# Patient Record
Sex: MALE | Race: BLACK OR AFRICAN AMERICAN | NOT HISPANIC OR LATINO | Employment: OTHER | URBAN - METROPOLITAN AREA
[De-identification: names, ages, dates, MRNs, and addresses within clinical notes are randomized per-mention and may not be internally consistent; named-entity substitution may affect disease eponyms.]

---

## 2017-06-21 ENCOUNTER — APPOINTMENT (OUTPATIENT)
Dept: GENERAL RADIOLOGY | Facility: HOSPITAL | Age: 72
End: 2017-06-21

## 2017-06-21 ENCOUNTER — HOSPITAL ENCOUNTER (EMERGENCY)
Facility: HOSPITAL | Age: 72
Discharge: HOME | End: 2017-06-21
Attending: EMERGENCY MEDICINE | Admitting: EMERGENCY MEDICINE

## 2017-06-21 VITALS
HEART RATE: 64 BPM | BODY MASS INDEX: 24.87 KG/M2 | WEIGHT: 200 LBS | OXYGEN SATURATION: 97 % | TEMPERATURE: 98 F | SYSTOLIC BLOOD PRESSURE: 143 MMHG | HEIGHT: 75 IN | RESPIRATION RATE: 16 BRPM | DIASTOLIC BLOOD PRESSURE: 76 MMHG

## 2017-06-21 DIAGNOSIS — M45.9 ANKYLOSING SPONDYLITIS (HCC): ICD-10-CM

## 2017-06-21 DIAGNOSIS — E87.6 HYPOKALEMIA: ICD-10-CM

## 2017-06-21 DIAGNOSIS — G89.29 EXACERBATION OF CHRONIC BACK PAIN: Primary | ICD-10-CM

## 2017-06-21 DIAGNOSIS — M54.9 EXACERBATION OF CHRONIC BACK PAIN: Primary | ICD-10-CM

## 2017-06-21 LAB
ALBUMIN SERPL-MCNC: 3.6 G/DL (ref 3.5–5.2)
ALBUMIN/GLOB SERPL: 0.7 G/DL
ALP SERPL-CCNC: 53 U/L (ref 39–117)
ALT SERPL W P-5'-P-CCNC: 24 U/L (ref 1–41)
ANION GAP SERPL CALCULATED.3IONS-SCNC: 12.9 MMOL/L
AST SERPL-CCNC: 27 U/L (ref 1–40)
BASOPHILS # BLD AUTO: 0.04 10*3/MM3 (ref 0–0.2)
BASOPHILS NFR BLD AUTO: 0.4 % (ref 0–1.5)
BILIRUB SERPL-MCNC: 0.8 MG/DL (ref 0.1–1.2)
BILIRUB UR QL STRIP: NEGATIVE
BUN BLD-MCNC: 21 MG/DL (ref 8–23)
BUN/CREAT SERPL: 23.1 (ref 7–25)
CALCIUM SPEC-SCNC: 10 MG/DL (ref 8.6–10.5)
CHLORIDE SERPL-SCNC: 93 MMOL/L (ref 98–107)
CLARITY UR: CLEAR
CO2 SERPL-SCNC: 29.1 MMOL/L (ref 22–29)
COLOR UR: ABNORMAL
CREAT BLD-MCNC: 0.91 MG/DL (ref 0.76–1.27)
DEPRECATED RDW RBC AUTO: 47.8 FL (ref 37–54)
EOSINOPHIL # BLD AUTO: 0.27 10*3/MM3 (ref 0–0.7)
EOSINOPHIL NFR BLD AUTO: 3 % (ref 0.3–6.2)
ERYTHROCYTE [DISTWIDTH] IN BLOOD BY AUTOMATED COUNT: 14.3 % (ref 11.5–14.5)
GFR SERPL CREATININE-BSD FRML MDRD: 99 ML/MIN/1.73
GLOBULIN UR ELPH-MCNC: 5.2 GM/DL
GLUCOSE BLD-MCNC: 114 MG/DL (ref 65–99)
GLUCOSE UR STRIP-MCNC: NEGATIVE MG/DL
HCT VFR BLD AUTO: 40 % (ref 40.4–52.2)
HGB BLD-MCNC: 13.1 G/DL (ref 13.7–17.6)
HGB UR QL STRIP.AUTO: NEGATIVE
HOLD SPECIMEN: NORMAL
HOLD SPECIMEN: NORMAL
IMM GRANULOCYTES # BLD: 0.02 10*3/MM3 (ref 0–0.03)
IMM GRANULOCYTES NFR BLD: 0.2 % (ref 0–0.5)
KETONES UR QL STRIP: NEGATIVE
LEUKOCYTE ESTERASE UR QL STRIP.AUTO: NEGATIVE
LYMPHOCYTES # BLD AUTO: 1.74 10*3/MM3 (ref 0.9–4.8)
LYMPHOCYTES NFR BLD AUTO: 19.2 % (ref 19.6–45.3)
MAGNESIUM SERPL-MCNC: 2.4 MG/DL (ref 1.6–2.4)
MCH RBC QN AUTO: 29.8 PG (ref 27–32.7)
MCHC RBC AUTO-ENTMCNC: 32.8 G/DL (ref 32.6–36.4)
MCV RBC AUTO: 90.9 FL (ref 79.8–96.2)
MONOCYTES # BLD AUTO: 0.84 10*3/MM3 (ref 0.2–1.2)
MONOCYTES NFR BLD AUTO: 9.3 % (ref 5–12)
NEUTROPHILS # BLD AUTO: 6.14 10*3/MM3 (ref 1.9–8.1)
NEUTROPHILS NFR BLD AUTO: 67.9 % (ref 42.7–76)
NITRITE UR QL STRIP: NEGATIVE
PH UR STRIP.AUTO: 6 [PH] (ref 5–8)
PLATELET # BLD AUTO: 347 10*3/MM3 (ref 140–500)
PMV BLD AUTO: 9.9 FL (ref 6–12)
POTASSIUM BLD-SCNC: 3.2 MMOL/L (ref 3.5–5.2)
PROT SERPL-MCNC: 8.8 G/DL (ref 6–8.5)
PROT UR QL STRIP: ABNORMAL
RBC # BLD AUTO: 4.4 10*6/MM3 (ref 4.6–6)
SODIUM BLD-SCNC: 135 MMOL/L (ref 136–145)
SP GR UR STRIP: 1.02 (ref 1–1.03)
TROPONIN T SERPL-MCNC: <0.01 NG/ML (ref 0–0.03)
UROBILINOGEN UR QL STRIP: ABNORMAL
WBC NRBC COR # BLD: 9.05 10*3/MM3 (ref 4.5–10.7)
WHOLE BLOOD HOLD SPECIMEN: NORMAL
WHOLE BLOOD HOLD SPECIMEN: NORMAL

## 2017-06-21 PROCEDURE — 71020 HC CHEST PA AND LATERAL: CPT

## 2017-06-21 PROCEDURE — 96374 THER/PROPH/DIAG INJ IV PUSH: CPT

## 2017-06-21 PROCEDURE — 96375 TX/PRO/DX INJ NEW DRUG ADDON: CPT

## 2017-06-21 PROCEDURE — 85025 COMPLETE CBC W/AUTO DIFF WBC: CPT | Performed by: EMERGENCY MEDICINE

## 2017-06-21 PROCEDURE — 25010000002 MORPHINE PER 10 MG: Performed by: EMERGENCY MEDICINE

## 2017-06-21 PROCEDURE — 83735 ASSAY OF MAGNESIUM: CPT | Performed by: EMERGENCY MEDICINE

## 2017-06-21 PROCEDURE — 96361 HYDRATE IV INFUSION ADD-ON: CPT

## 2017-06-21 PROCEDURE — 99284 EMERGENCY DEPT VISIT MOD MDM: CPT

## 2017-06-21 PROCEDURE — 25010000002 ONDANSETRON PER 1 MG: Performed by: EMERGENCY MEDICINE

## 2017-06-21 PROCEDURE — 93005 ELECTROCARDIOGRAM TRACING: CPT | Performed by: EMERGENCY MEDICINE

## 2017-06-21 PROCEDURE — 25010000002 METHYLPREDNISOLONE PER 125 MG: Performed by: EMERGENCY MEDICINE

## 2017-06-21 PROCEDURE — 80053 COMPREHEN METABOLIC PANEL: CPT | Performed by: EMERGENCY MEDICINE

## 2017-06-21 PROCEDURE — 84484 ASSAY OF TROPONIN QUANT: CPT | Performed by: EMERGENCY MEDICINE

## 2017-06-21 PROCEDURE — 36415 COLL VENOUS BLD VENIPUNCTURE: CPT | Performed by: EMERGENCY MEDICINE

## 2017-06-21 PROCEDURE — 81003 URINALYSIS AUTO W/O SCOPE: CPT | Performed by: EMERGENCY MEDICINE

## 2017-06-21 RX ORDER — OXYCODONE HYDROCHLORIDE AND ACETAMINOPHEN 5; 325 MG/1; MG/1
1 TABLET ORAL EVERY 6 HOURS PRN
Qty: 30 TABLET | Refills: 0 | Status: SHIPPED | OUTPATIENT
Start: 2017-06-21 | End: 2017-06-29

## 2017-06-21 RX ORDER — SODIUM CHLORIDE 0.9 % (FLUSH) 0.9 %
10 SYRINGE (ML) INJECTION AS NEEDED
Status: DISCONTINUED | OUTPATIENT
Start: 2017-06-21 | End: 2017-06-22 | Stop reason: HOSPADM

## 2017-06-21 RX ORDER — SODIUM CHLORIDE 9 MG/ML
125 INJECTION, SOLUTION INTRAVENOUS CONTINUOUS
Status: DISCONTINUED | OUTPATIENT
Start: 2017-06-21 | End: 2017-06-22 | Stop reason: HOSPADM

## 2017-06-21 RX ORDER — MORPHINE SULFATE 2 MG/ML
2 INJECTION, SOLUTION INTRAMUSCULAR; INTRAVENOUS ONCE
Status: COMPLETED | OUTPATIENT
Start: 2017-06-21 | End: 2017-06-21

## 2017-06-21 RX ORDER — METHYLPREDNISOLONE SODIUM SUCCINATE 125 MG/2ML
125 INJECTION, POWDER, LYOPHILIZED, FOR SOLUTION INTRAMUSCULAR; INTRAVENOUS ONCE
Status: COMPLETED | OUTPATIENT
Start: 2017-06-21 | End: 2017-06-21

## 2017-06-21 RX ORDER — PREDNISONE 10 MG/1
40 TABLET ORAL DAILY
Qty: 120 TABLET | Refills: 0 | Status: SHIPPED | OUTPATIENT
Start: 2017-06-21 | End: 2017-07-27 | Stop reason: SDUPTHER

## 2017-06-21 RX ORDER — ONDANSETRON 2 MG/ML
4 INJECTION INTRAMUSCULAR; INTRAVENOUS ONCE
Status: COMPLETED | OUTPATIENT
Start: 2017-06-21 | End: 2017-06-21

## 2017-06-21 RX ADMIN — ONDANSETRON 4 MG: 2 INJECTION INTRAMUSCULAR; INTRAVENOUS at 21:21

## 2017-06-21 RX ADMIN — MORPHINE SULFATE 2 MG: 2 INJECTION, SOLUTION INTRAMUSCULAR; INTRAVENOUS at 21:21

## 2017-06-21 RX ADMIN — METHYLPREDNISOLONE SODIUM SUCCINATE 125 MG: 125 INJECTION, POWDER, FOR SOLUTION INTRAMUSCULAR; INTRAVENOUS at 21:17

## 2017-06-21 RX ADMIN — SODIUM CHLORIDE 125 ML/HR: 9 INJECTION, SOLUTION INTRAVENOUS at 21:20

## 2017-06-21 RX ADMIN — SODIUM CHLORIDE 500 ML: 9 INJECTION, SOLUTION INTRAVENOUS at 21:17

## 2017-06-21 NOTE — ED TRIAGE NOTES
"Pt states his body is shutting down, unable to eat or drink much, fell last week, body aches, dizzy, \" all over pain\"  "

## 2017-06-22 NOTE — ED PROVIDER NOTES
EMERGENCY DEPARTMENT ENCOUNTER    CHIEF COMPLAINT  Chief Complaint: generalized myalgias  History given by: patient, family  History limited by: nothing  Room Number: 19/19  PMD: Dario Martinez MD      HPI:  Pt is a 72 y.o. male with a history of ankylosing spondylitis, who presents complaining of malaise and generalized myalgias for the last two weeks. Pt also complains of N/V, generalized weakness and decreased appetite. Pt denies any recent medication changes but states that he did lose 20 pounds over the last month. Pt states that he had similar symptoms in 2000 and states that he was started on Remicade and his prednisone dosage was increased at that time. Pt states that he recently moved to [place] from [place] and has not established care in [place] yet.    Duration:  2 weeks  Onset: gradual  Timing: constant  Location: generalized  Radiation: N/A  Quality: myalgias  Intensity/Severity: moderate  Progression: worsening  Associated Symptoms: malaise, N/V, decreased appetite, weight loss, generalized weakness  Aggravating Factors: none  Alleviating Factors: none  Previous Episodes: Pt states that he had similar symptoms in 2000 and states that he was started on Remicade and his prednisone dosage was increased at that time.  Treatment before arrival: Pt states that he takes 5mg Prednisone daily w/o relief.     PAST MEDICAL HISTORY  Active Ambulatory Problems     Diagnosis Date Noted   • No Active Ambulatory Problems     Resolved Ambulatory Problems     Diagnosis Date Noted   • No Resolved Ambulatory Problems     Past Medical History:   Diagnosis Date   • AS (ankylosing spondylitis)        PAST SURGICAL HISTORY  History reviewed. No pertinent surgical history.    FAMILY HISTORY  History reviewed. No pertinent family history.    SOCIAL HISTORY  Social History     Social History   • Marital status: Single     Spouse name: N/A   • Number of children: N/A   • Years of education: N/A     Occupational  History   • Not on file.     Social History Main Topics   • Smoking status: Not on file   • Smokeless tobacco: Not on file   • Alcohol use Not on file   • Drug use: Not on file   • Sexual activity: Not on file     Other Topics Concern   • Not on file     Social History Narrative   • No narrative on file       ALLERGIES  Review of patient's allergies indicates no known allergies.    REVIEW OF SYSTEMS  Review of Systems   Constitutional: Positive for appetite change (decreased) and unexpected weight change (20 pounds in the last month). Negative for activity change and fever.        Malaise   HENT: Negative for congestion and sore throat.    Eyes: Negative.    Respiratory: Negative for cough and shortness of breath.    Cardiovascular: Negative for chest pain and leg swelling.   Gastrointestinal: Positive for nausea and vomiting. Negative for abdominal pain and diarrhea.   Endocrine: Negative.    Genitourinary: Negative for decreased urine volume and dysuria.   Musculoskeletal: Negative for neck pain.   Skin: Negative for rash and wound.   Allergic/Immunologic: Negative.    Neurological: Positive for weakness (generalized). Negative for numbness and headaches.   Hematological: Negative.    Psychiatric/Behavioral: Negative.    All other systems reviewed and are negative.      PHYSICAL EXAM  ED Triage Vitals   Temp Heart Rate Resp BP SpO2   06/21/17 1922 06/21/17 1922 06/21/17 1922 06/21/17 1951 06/21/17 1922   98.5 °F (36.9 °C) 65 16 105/66 100 %      Temp src Heart Rate Source Patient Position BP Location FiO2 (%)   06/21/17 1922 06/21/17 1922 -- -- --   Tympanic Monitor          Physical Exam   Constitutional: He is oriented to person, place, and time and well-developed, well-nourished, and in no distress.  Non-toxic appearance.   HENT:   Head: Normocephalic and atraumatic.   Mouth/Throat: Mucous membranes are normal. Mucous membranes are not dry.   Eyes: EOM are normal. Pupils are equal, round, and reactive to light.    Neck: Neck supple.   Cardiovascular: Normal rate, regular rhythm and normal heart sounds.    Pulmonary/Chest: Effort normal and breath sounds normal. No respiratory distress.   Abdominal: Soft. There is no tenderness. There is no rebound and no guarding.   Musculoskeletal: He exhibits no edema.   Pt's joints, especially the C-spine and T-spine, are stiff c/w pt's known diagnosis of ankylosing spondylosis.    Neurological: He is alert and oriented to person, place, and time. He has normal sensation and normal strength.   Skin: Skin is warm and dry.   Psychiatric: Mood and affect normal.   Nursing note and vitals reviewed.      LAB RESULTS  Lab Results (last 24 hours)     Procedure Component Value Units Date/Time    CBC & Differential [[id number]] Collected:  06/21/17 2003    Specimen:  Blood Updated:  06/21/17 2031    Narrative:       The following orders were created for panel order CBC & Differential.  Procedure                               Abnormality         Status                     ---------                               -----------         ------                     CBC Auto Differential[[id number]]        Abnormal            Final result                 Please view results for these tests on the individual orders.    Comprehensive Metabolic Panel [[id number]]  (Abnormal) Collected:  06/21/17 2003    Specimen:  Blood Updated:  06/21/17 2046     Glucose 114 (H) mg/dL      BUN 21 mg/dL      Creatinine 0.91 mg/dL      Sodium 135 (L) mmol/L      Potassium 3.2 (L) mmol/L      Chloride 93 (L) mmol/L      CO2 29.1 (H) mmol/L      Calcium 10.0 mg/dL      Total Protein 8.8 (H) g/dL      Albumin 3.60 g/dL      ALT (SGPT) 24 U/L      AST (SGOT) 27 U/L      Alkaline Phosphatase 53 U/L      Total Bilirubin 0.8 mg/dL      eGFR  African Amer 99 mL/min/1.73      Globulin 5.2 gm/dL      A/G Ratio 0.7 g/dL      BUN/Creatinine Ratio 23.1     Anion Gap 12.9 mmol/L     Narrative:       The MDRD GFR formula is only valid for  adults with stable renal function between ages 18 and 70.    Troponin [[id number]]  (Normal) Collected:  06/21/17 2003    Specimen:  Blood Updated:  06/21/17 2046     Troponin T <0.010 ng/mL     Narrative:       Troponin T Reference Ranges:  Less than 0.03 ng/mL:    Negative for AMI  0.03 to 0.09 ng/mL:      Indeterminant for AMI  Greater than 0.09 ng/mL: Positive for AMI    Magnesium [[id number]]  (Normal) Collected:  06/21/17 2003    Specimen:  Blood Updated:  06/21/17 2046     Magnesium 2.4 mg/dL     CBC Auto Differential [[id number]]  (Abnormal) Collected:  06/21/17 2003    Specimen:  Blood Updated:  06/21/17 2031     WBC 9.05 10*3/mm3      RBC 4.40 (L) 10*6/mm3      Hemoglobin 13.1 (L) g/dL      Hematocrit 40.0 (L) %      MCV 90.9 fL      MCH 29.8 pg      MCHC 32.8 g/dL      RDW 14.3 %      RDW-SD 47.8 fl      MPV 9.9 fL      Platelets 347 10*3/mm3      Neutrophil % 67.9 %      Lymphocyte % 19.2 (L) %      Monocyte % 9.3 %      Eosinophil % 3.0 %      Basophil % 0.4 %      Immature Grans % 0.2 %      Neutrophils, Absolute 6.14 10*3/mm3      Lymphocytes, Absolute 1.74 10*3/mm3      Monocytes, Absolute 0.84 10*3/mm3      Eosinophils, Absolute 0.27 10*3/mm3      Basophils, Absolute 0.04 10*3/mm3      Immature Grans, Absolute 0.02 10*3/mm3     Urinalysis With / Culture If Indicated [[id number]]  (Abnormal) Collected:  06/21/17 2023    Specimen:  Urine from Urine, Clean Catch Updated:  06/21/17 2054     Color, UA Dark Yellow (A)     Appearance, UA Clear     pH, UA 6.0     Specific Gravity, UA 1.018     Glucose, UA Negative     Ketones, UA Negative     Bilirubin, UA Negative     Blood, UA Negative     Protein, UA Trace (A)     Leuk Esterase, UA Negative     Nitrite, UA Negative     Urobilinogen, UA 1.0 E.U./dL    Narrative:       Urine microscopic not indicated.          I ordered the above labs and reviewed the results    RADIOLOGY  XR Chest 2 View   Final Result   No focal pulmonary consolidation. COPD change. Tortuous    aorta. Follow-up as clinically indicated.       This report was finalized on 6/21/2017 8:34 PM by Dr. Len Kennedy MD.               I ordered the above noted radiological studies. Interpreted by radiologist. Reviewed by me in PACS.       PROCEDURES  Procedures      PROGRESS AND CONSULTS  ED Course     2103- Notified pt and family of the pt's unremarkable lab and imaging results. Discussed the plan to order IV steroids prior to discharging the pt home on a prednisone taper. Pt and family agree with the plan and all questions were addressed.    2105- Ordered solu-medrol for ankylosing spondylosis and IVF for hydration.    2116- Ordered morphine and zofran for pain.    9:42 PM  Latest vital signs   BP- 125/73 HR- 54 Temp- 98.5 °F (36.9 °C) (Tympanic) O2 sat- 100%    2221- Rechecked pt. Pt states that he feels much improved after the morphine and would like to be discharged home after his IVF are completed. Discussed the plan to discharge the pt home with a prescription for pain medication and steroids. Pt and family agree with the plan and all questions were addressed.    MEDICAL DECISION MAKING  Results were reviewed/discussed with the patient and they were also made aware of online access. Pt also made aware that some labs, such as cultures, will not be resulted during ER visit and follow up with PMD is necessary.     MDM  Number of Diagnoses or Management Options  Ankylosing spondylitis:   Exacerbation of chronic back pain:   Hypokalemia:      Amount and/or Complexity of Data Reviewed  Clinical lab tests: ordered and reviewed (Potassium=3.2 )  Tests in the radiology section of CPT®: ordered and reviewed (CXR shows nothing acute)  Decide to obtain previous medical records or to obtain history from someone other than the patient: yes  Obtain history from someone other than the patient: yes (spouse)  Review and summarize past medical records: yes (Pt has no prior medical records previously)  Independent  visualization of images, tracings, or specimens: yes    Patient Progress  Patient progress: stable         DIAGNOSIS  Final diagnoses:   Exacerbation of chronic back pain   Ankylosing spondylitis   Hypokalemia       DISPOSITION  DISCHARGE    Patient discharged in stable condition.    Reviewed implications of results, diagnosis, meds, responsibility to follow up, warning signs and symptoms of possible worsening, potential complications and reasons to return to ER, including fever, worsening pain or any concerns.    Patient/Family voiced understanding of above instructions.    Discussed plan for discharge, as there is no emergent indication for admission.  Pt/family is agreeable and understands need for follow up and repeat testing.  Pt is aware that discharge does not mean that nothing is wrong but it indicates no emergency is present that requires admission and they must continue care with follow-up as given below or physician of their choice.     FOLLOW-UP  Dario Martinez MD  [address] [address]  [phone]    Schedule an appointment as soon as possible for a visit           Medication List      New Prescriptions          oxyCODONE-acetaminophen 5-325 MG per tablet   Commonly known as:  PERCOCET   Take 1 tablet by mouth Every 6 (Six) Hours As Needed for Severe Pain   (7-10).         Changed          * PREDNISONE PO   What changed:  Another medication with the same name was added. Make sure   you understand how and when to take each.       * predniSONE 10 MG tablet   Commonly known as:  DELTASONE   Take 4 tablets by mouth Daily.   What changed:  You were already taking a medication with the same name,   and this prescription was added. Make sure you understand how and when to   take each.       * Notice:  This list has 2 medication(s) that are the same as other   medications prescribed for you. Read the directions carefully, and ask   your doctor or other care provider to review  them with you.            Latest Documented Vital Signs:  As of 10:26 PM  BP- 143/76 HR- 57 Temp- 98.5 °F (36.9 °C) (Tympanic) O2 sat- 98%    --  Documentation assistance provided by mitra Gorman for Dr. Poon.  Information recorded by the mitra was done at my direction and has been verified and validated by me.     Karrie Gorman  06/21/17 2209       Karrie Gorman  06/21/17 2222       Mick Poon MD  06/21/17 0952

## 2017-06-29 ENCOUNTER — OFFICE VISIT (OUTPATIENT)
Dept: INTERNAL MEDICINE | Age: 72
End: 2017-06-29

## 2017-06-29 VITALS
OXYGEN SATURATION: 98 % | HEIGHT: 75 IN | DIASTOLIC BLOOD PRESSURE: 64 MMHG | TEMPERATURE: 97.8 F | SYSTOLIC BLOOD PRESSURE: 118 MMHG | HEART RATE: 56 BPM | BODY MASS INDEX: 23.15 KG/M2 | WEIGHT: 186.2 LBS

## 2017-06-29 DIAGNOSIS — R63.4 UNINTENTIONAL WEIGHT LOSS OF MORE THAN 10 POUNDS: Primary | ICD-10-CM

## 2017-06-29 DIAGNOSIS — Z00.00 PREVENTATIVE HEALTH CARE: ICD-10-CM

## 2017-06-29 DIAGNOSIS — M45.2 ANKYLOSING SPONDYLITIS OF CERVICAL REGION (HCC): Chronic | ICD-10-CM

## 2017-06-29 DIAGNOSIS — R10.13 ABDOMINAL PAIN, ACUTE, EPIGASTRIC: ICD-10-CM

## 2017-06-29 PROCEDURE — 99204 OFFICE O/P NEW MOD 45 MIN: CPT | Performed by: NURSE PRACTITIONER

## 2017-06-29 RX ORDER — ATENOLOL AND CHLORTHALIDONE TABLET 50; 25 MG/1; MG/1
1 TABLET ORAL DAILY
COMMUNITY
End: 2017-10-19 | Stop reason: SDUPTHER

## 2017-06-30 ENCOUNTER — TELEPHONE (OUTPATIENT)
Dept: INTERNAL MEDICINE | Age: 72
End: 2017-06-30

## 2017-06-30 LAB
ALBUMIN SERPL-MCNC: 4.1 G/DL (ref 3.5–5.2)
ALBUMIN/GLOB SERPL: 1.1 G/DL
ALP SERPL-CCNC: 61 U/L (ref 39–117)
ALT SERPL-CCNC: 83 U/L (ref 1–41)
AMYLASE SERPL-CCNC: 159 U/L (ref 28–100)
AST SERPL-CCNC: 57 U/L (ref 1–40)
BASOPHILS # BLD AUTO: 0.01 10*3/MM3 (ref 0–0.2)
BASOPHILS NFR BLD AUTO: 0.1 % (ref 0–1.5)
BILIRUB SERPL-MCNC: 0.4 MG/DL (ref 0.1–1.2)
BUN SERPL-MCNC: 23 MG/DL (ref 8–23)
BUN/CREAT SERPL: 26.1 (ref 7–25)
CALCIUM SERPL-MCNC: 10.4 MG/DL (ref 8.6–10.5)
CHLORIDE SERPL-SCNC: 99 MMOL/L (ref 98–107)
CO2 SERPL-SCNC: 30.6 MMOL/L (ref 22–29)
CREAT SERPL-MCNC: 0.88 MG/DL (ref 0.76–1.27)
EOSINOPHIL # BLD AUTO: 0 10*3/MM3 (ref 0–0.7)
EOSINOPHIL NFR BLD AUTO: 0 % (ref 0.3–6.2)
ERYTHROCYTE [DISTWIDTH] IN BLOOD BY AUTOMATED COUNT: 14.3 % (ref 11.5–14.5)
GLOBULIN SER CALC-MCNC: 3.9 GM/DL
GLUCOSE SERPL-MCNC: 117 MG/DL (ref 65–99)
HCT VFR BLD AUTO: 42.9 % (ref 40.4–52.2)
HGB BLD-MCNC: 13.5 G/DL (ref 13.7–17.6)
IMM GRANULOCYTES # BLD: 0 10*3/MM3 (ref 0–0.03)
IMM GRANULOCYTES NFR BLD: 0 % (ref 0–0.5)
LIPASE SERPL-CCNC: 86 U/L (ref 13–60)
LYMPHOCYTES # BLD AUTO: 0.79 10*3/MM3 (ref 0.9–4.8)
LYMPHOCYTES NFR BLD AUTO: 8.5 % (ref 19.6–45.3)
MCH RBC QN AUTO: 29.9 PG (ref 27–32.7)
MCHC RBC AUTO-ENTMCNC: 31.5 G/DL (ref 32.6–36.4)
MCV RBC AUTO: 95.1 FL (ref 79.8–96.2)
MONOCYTES # BLD AUTO: 0.37 10*3/MM3 (ref 0.2–1.2)
MONOCYTES NFR BLD AUTO: 4 % (ref 5–12)
NEUTROPHILS # BLD AUTO: 8.11 10*3/MM3 (ref 1.9–8.1)
NEUTROPHILS NFR BLD AUTO: 87.4 % (ref 42.7–76)
PLATELET # BLD AUTO: 378 10*3/MM3 (ref 140–500)
POTASSIUM SERPL-SCNC: 4.5 MMOL/L (ref 3.5–5.2)
PROT SERPL-MCNC: 8 G/DL (ref 6–8.5)
RBC # BLD AUTO: 4.51 10*6/MM3 (ref 4.6–6)
SODIUM SERPL-SCNC: 143 MMOL/L (ref 136–145)
T4 FREE SERPL-MCNC: 1.41 NG/DL (ref 0.93–1.7)
TSH SERPL DL<=0.005 MIU/L-ACNC: 0.87 MIU/ML (ref 0.27–4.2)
WBC # BLD AUTO: 9.28 10*3/MM3 (ref 4.5–10.7)

## 2017-06-30 NOTE — TELEPHONE ENCOUNTER
----- Message from NOHEMI López sent at 6/30/2017  7:42 AM EDT -----  Please call patient.     Your CBC, which looks for obvious infection or anemia, shows a very mild anemia. Thyroid is fine.  Your complete metabolic panel, which looks at electrolytes, kidney, and liver shows that your electrolyes and kidney function is fine, but your liver function test is slightly elevated. We also did blood tests for your pancreas which are also slightly elevated, but at this time, I am not sure of the significance of this. The CT that I ordered should give us more insight. Once I have these results, we'll call you to follow up with me.

## 2017-06-30 NOTE — TELEPHONE ENCOUNTER
Called pt with lab results. Pt was informed of results. Pt was informed that CT will be scheduled. Pt was informed that we will call him to schedule follow up when results are read.  Pt demonstrated understanding.    KD

## 2017-07-07 ENCOUNTER — HOSPITAL ENCOUNTER (OUTPATIENT)
Dept: CT IMAGING | Facility: HOSPITAL | Age: 72
Discharge: HOME | End: 2017-07-07
Admitting: NURSE PRACTITIONER

## 2017-07-07 LAB — CREAT BLDA-MCNC: 0.7 MG/DL (ref 0.6–1.3)

## 2017-07-07 PROCEDURE — 82565 ASSAY OF CREATININE: CPT

## 2017-07-07 PROCEDURE — 0 IOPAMIDOL 61 % SOLUTION: Performed by: NURSE PRACTITIONER

## 2017-07-07 PROCEDURE — 74177 CT ABD & PELVIS W/CONTRAST: CPT

## 2017-07-07 PROCEDURE — 0 DIATRIZOATE MEGLUMINE & SODIUM PER 1 ML: Performed by: NURSE PRACTITIONER

## 2017-07-07 RX ADMIN — IOPAMIDOL 85 ML: 612 INJECTION, SOLUTION INTRAVENOUS at 09:45

## 2017-07-07 RX ADMIN — DIATRIZOATE MEGLUMINE AND DIATRIZOATE SODIUM 30 ML: 660; 100 LIQUID ORAL; RECTAL at 09:45

## 2017-07-11 ENCOUNTER — TELEPHONE (OUTPATIENT)
Dept: INTERNAL MEDICINE | Age: 72
End: 2017-07-11

## 2017-07-11 NOTE — TELEPHONE ENCOUNTER
Called pt regarding CT results. Pt was informed of normal CT. Pt was informed that it will be discusses at appt on 7/13/2017.  Pt demonstrated understanding.    KD

## 2017-07-11 NOTE — TELEPHONE ENCOUNTER
----- Message from NOHEMI López sent at 7/11/2017 12:14 PM EDT -----  Please call patient.     Inform patient that his abdominal CT looks normal. We will discuss in further detail at his follow up visit on Thursday.

## 2017-07-14 ENCOUNTER — OFFICE VISIT (OUTPATIENT)
Dept: INTERNAL MEDICINE | Age: 72
End: 2017-07-14

## 2017-07-14 VITALS
TEMPERATURE: 95.9 F | SYSTOLIC BLOOD PRESSURE: 124 MMHG | WEIGHT: 189.4 LBS | HEART RATE: 62 BPM | HEIGHT: 75 IN | OXYGEN SATURATION: 97 % | BODY MASS INDEX: 23.55 KG/M2 | DIASTOLIC BLOOD PRESSURE: 62 MMHG

## 2017-07-14 DIAGNOSIS — R79.89 ELEVATED LIVER FUNCTION TESTS: Primary | ICD-10-CM

## 2017-07-14 DIAGNOSIS — R63.4 RECENT UNEXPLAINED WEIGHT LOSS: ICD-10-CM

## 2017-07-14 DIAGNOSIS — R74.8 ELEVATED PANCREATIC ENZYME: ICD-10-CM

## 2017-07-14 PROCEDURE — 99213 OFFICE O/P EST LOW 20 MIN: CPT | Performed by: NURSE PRACTITIONER

## 2017-07-14 NOTE — PROGRESS NOTES
Subjective   Anthony Gallegos is a 72 y.o. male.     Weight Loss   This is a new problem. The current episode started 1 to 4 weeks ago. The problem has been gradually improving. Associated symptoms include abdominal pain and neck pain (chronic neck pain related to ankylosing spondylitis). Pertinent negatives include no anorexia, arthralgias, change in bowel habit, fatigue, fever, joint swelling, myalgias, nausea, vomiting or weakness. Nothing aggravates the symptoms.   Abdominal Pain   This is a new problem. The current episode started 1 to 4 weeks ago. The problem has been resolved. The patient is experiencing no pain. Associated symptoms include weight loss. Pertinent negatives include no anorexia, arthralgias, constipation, diarrhea, dysuria, fever, frequency, myalgias, nausea or vomiting.    Patient was seen by me 2 weeks ago for recent unexplained weight loss, which at that time was associated with some nausea and abdominal pain.  Today he reports he has gained 3 pounds since last visit.  Denies any further abdominal pain or nausea, which has since resolved.     Review of his labs obtained by me demonstrate mild elevation in ALT and AST, as well as pancreatic enzymes.  Patient had abdominal and pelvic CT done on 6/29/17 which was negative.    The following portions of the patient's history were reviewed and updated as appropriate: allergies, current medications, past family history, past medical history, past social history, past surgical history and problem list.    Review of Systems   Constitutional: Positive for weight loss. Negative for activity change, appetite change, fatigue, fever and unexpected weight change (has regained 3 pounds in 2 weeks).   Gastrointestinal: Positive for abdominal pain. Negative for abdominal distention, anorexia, blood in stool, change in bowel habit, constipation, diarrhea, nausea, rectal pain and vomiting.   Genitourinary: Negative for dysuria and frequency.   Musculoskeletal:  Positive for neck pain (chronic neck pain related to ankylosing spondylitis). Negative for arthralgias, joint swelling and myalgias.   Neurological: Negative for dizziness and weakness.       Objective   Physical Exam   Constitutional: Vital signs are normal. He appears well-developed and well-nourished. He is cooperative. He does not appear ill. No distress.   Cardiovascular: Normal rate, regular rhythm, S1 normal, S2 normal and normal heart sounds.    No murmur heard.  Pulmonary/Chest: Effort normal and breath sounds normal.   Abdominal: Soft. Normal appearance and bowel sounds are normal. He exhibits no distension and no mass. There is no hepatosplenomegaly. There is no tenderness. There is no rebound, no CVA tenderness, no tenderness at McBurney's point and negative Menchaca's sign.   Neurological: He is alert. No cranial nerve deficit or sensory deficit.   Skin: Skin is warm, dry and intact.   Psychiatric: He has a normal mood and affect. His speech is normal and behavior is normal. Judgment and thought content normal. Cognition and memory are normal.   Nursing note and vitals reviewed.      Assessment/Plan   Problems Addressed this Visit        Other    Recent unexplained weight loss      Other Visit Diagnoses     Elevated liver function tests    -  Primary    Relevant Orders    Comprehensive Metabolic Panel    Elevated pancreatic enzyme        Relevant Orders    Amylase    Lipase        1. Elevated liver function tests  Recent liver function and pancreatic enzyme testing shows mild elevation of values.  I will recheck these values today to determine if any further testing needed at this time.  Patient does have appointment with gastroenterology end of this month for screening colonoscopy; if levels remain elevated or worsen, we'll consider referral to GI for further evaluation.   - Comprehensive Metabolic Panel    2. Elevated pancreatic enzyme  Labs ordered today as above.  - Amylase  - Lipase    3. Recent  unexplained weight loss  Patient reports improvement in appetite, resolution of abdominal pain and nausea, and recent weight gain of 3 pounds in 2 weeks.  Patient to follow-up with me in 3 months (pending results of today's labs), sooner as needed

## 2017-07-15 LAB
ALBUMIN SERPL-MCNC: 3.9 G/DL (ref 3.5–5.2)
ALBUMIN/GLOB SERPL: 1.1 G/DL
ALP SERPL-CCNC: 58 U/L (ref 39–117)
ALT SERPL-CCNC: 36 U/L (ref 1–41)
AMYLASE SERPL-CCNC: 148 U/L (ref 28–100)
AST SERPL-CCNC: 26 U/L (ref 1–40)
BILIRUB SERPL-MCNC: 0.4 MG/DL (ref 0.1–1.2)
BUN SERPL-MCNC: 23 MG/DL (ref 8–23)
BUN/CREAT SERPL: 25.3 (ref 7–25)
CALCIUM SERPL-MCNC: 10 MG/DL (ref 8.6–10.5)
CHLORIDE SERPL-SCNC: 100 MMOL/L (ref 98–107)
CO2 SERPL-SCNC: 27.9 MMOL/L (ref 22–29)
CREAT SERPL-MCNC: 0.91 MG/DL (ref 0.76–1.27)
GLOBULIN SER CALC-MCNC: 3.4 GM/DL
GLUCOSE SERPL-MCNC: 138 MG/DL (ref 65–99)
LIPASE SERPL-CCNC: 101 U/L (ref 13–60)
POTASSIUM SERPL-SCNC: 3.6 MMOL/L (ref 3.5–5.2)
PROT SERPL-MCNC: 7.3 G/DL (ref 6–8.5)
SODIUM SERPL-SCNC: 142 MMOL/L (ref 136–145)

## 2017-07-17 ENCOUNTER — TELEPHONE (OUTPATIENT)
Dept: INTERNAL MEDICINE | Age: 72
End: 2017-07-17

## 2017-07-17 DIAGNOSIS — R74.8 ELEVATED AMYLASE AND LIPASE: Primary | ICD-10-CM

## 2017-07-17 NOTE — TELEPHONE ENCOUNTER
----- Message from NOHEMI López sent at 7/17/2017 11:30 AM EDT -----  Please call patient.     Your liver function levels are back to normal.  However, your pancreatic enzymes are still elevated and one of them is actually slightly higher than it was when we checked them 2 weeks ago.  I'm not sure of the significance of this elevation.  I am going to go ahead and put a referral in to the GI specialist as we discussed for further evaluation of these levels.

## 2017-07-17 NOTE — TELEPHONE ENCOUNTER
Called pt regarding lab results. Pt was informed that LFT's have normalized. Pt was also informed that pancreatic enzymes are still elevated, and one is slightly higher than 2 weeks prior. Pt was informed of GI referral, and was informed that scheduling will contact him for appt.  Pt demonstrated understanding, and was agreeable to referral.   Pt also stated that he is feeling better, and is also eating more than before, as well as having daily bowel movements.    KD

## 2017-07-28 RX ORDER — PREDNISONE 10 MG/1
TABLET ORAL
Qty: 120 TABLET | Refills: 0 | Status: SHIPPED | OUTPATIENT
Start: 2017-07-28 | End: 2017-08-30 | Stop reason: SDUPTHER

## 2017-08-10 ENCOUNTER — OFFICE VISIT (OUTPATIENT)
Dept: GASTROENTEROLOGY | Facility: CLINIC | Age: 72
End: 2017-08-10

## 2017-08-10 VITALS
HEART RATE: 69 BPM | WEIGHT: 194 LBS | SYSTOLIC BLOOD PRESSURE: 128 MMHG | BODY MASS INDEX: 24.12 KG/M2 | DIASTOLIC BLOOD PRESSURE: 79 MMHG | HEIGHT: 75 IN

## 2017-08-10 DIAGNOSIS — R74.8 ABNORMAL SERUM LEVEL OF AMYLASE: ICD-10-CM

## 2017-08-10 DIAGNOSIS — R63.4 RECENT UNEXPLAINED WEIGHT LOSS: ICD-10-CM

## 2017-08-10 DIAGNOSIS — M45.2 ANKYLOSING SPONDYLITIS OF CERVICAL REGION (HCC): Chronic | ICD-10-CM

## 2017-08-10 DIAGNOSIS — R74.8 ABNORMAL SERUM LIPASE LEVEL: Primary | ICD-10-CM

## 2017-08-10 PROCEDURE — 99203 OFFICE O/P NEW LOW 30 MIN: CPT | Performed by: INTERNAL MEDICINE

## 2017-08-10 NOTE — PROGRESS NOTES
Subjective   Anthony Gallegos is a 72 y.o. male is being seen for consultation today at the request of No ref. provider found  Chief Complaint   Patient presents with   • Abnormal Lab     amylase,lipase   • Weight Loss     History of Present Illness  Patient over the last several months is had some weight loss.  It seemed to be due to poor oral intake which she attributed to some vague nausea and some vague abdominal pain.  CT scan of the abdomen was performed and demonstrated no significant pathology, particularly the pancreas and liver appeared normal.  Amylase and lipase were both measured and both were persistently mildly elevated.  Patient does not consume alcohol.  He has ankylosing spondylitis and is on methotrexate and occasional steroid therapy for that.  In any case, he says at the time of this visit he feels fine, really doesn't have any particular symptoms or concerns.  The following portions of the patient's history were reviewed and updated as appropriate: allergies, current medications, past family history, past medical history, past social history, past surgical history and problem list.    Review of Systems   Constitutional: Negative for appetite change, diaphoresis, fatigue, fever and unexpected weight change.   HENT: Negative for hearing loss, mouth sores, sore throat and trouble swallowing.    Eyes: Negative for pain and redness.   Respiratory: Negative for choking and shortness of breath.    Cardiovascular: Negative for chest pain and leg swelling.   Gastrointestinal: Negative for abdominal distention, abdominal pain, anal bleeding, blood in stool, constipation, diarrhea, nausea, rectal pain and vomiting.   Genitourinary: Negative for flank pain and hematuria.   Musculoskeletal: Negative for arthralgias and joint swelling.   Skin: Negative for color change and rash.   Allergic/Immunologic: Negative for food allergies and immunocompromised state.   Neurological: Negative for dizziness, seizures  and headaches.   Hematological: Does not bruise/bleed easily.   Psychiatric/Behavioral: Negative for confusion, sleep disturbance and suicidal ideas. The patient is not nervous/anxious.        Objective   Physical Exam   Constitutional: He is oriented to person, place, and time. He appears well-developed and well-nourished.   HENT:   Head: Normocephalic and atraumatic.   Nose: Nose normal.   Eyes: Conjunctivae are normal. Pupils are equal, round, and reactive to light.   Neck: Normal range of motion. Neck supple. No thyromegaly present.   Cardiovascular: Normal heart sounds.  Exam reveals no gallop and no friction rub.    No murmur heard.  Pulmonary/Chest: Effort normal and breath sounds normal.   Abdominal: Soft. Bowel sounds are normal. He exhibits no distension and no mass. There is no tenderness.   Musculoskeletal: He exhibits no edema.   Lymphadenopathy:     He has no cervical adenopathy.   Neurological: He is alert and oriented to person, place, and time.   Skin: No rash noted. No erythema.   Psychiatric: He has a normal mood and affect. His behavior is normal.   Nursing note and vitals reviewed.        Assessment/Plan   Problems Addressed this Visit        Musculoskeletal and Integument    Ankylosing spondylitis of cervical region (Chronic)       Other    Recent unexplained weight loss    Abnormal serum lipase level - Primary    Abnormal serum level of amylase        The patient's amylase and lipase elevations are mild and nonspecific.  A normal CT scan of the pancreas in this situation is very reassuring and his resolution of symptoms, whatever they were due to, is also very reassuring.  I think at this moment I would not do any additional evaluation.  However, should he experience recurrent symptoms of abdominal pain nausea vomiting and recurrent weight loss I would be more happy to see him back.

## 2017-08-11 ENCOUNTER — TELEPHONE (OUTPATIENT)
Dept: INTERNAL MEDICINE | Age: 72
End: 2017-08-11

## 2017-08-11 NOTE — TELEPHONE ENCOUNTER
Pt called with update after consult with Dr. Haider Carty.    Pt was seen by Dr. Carty (GI) yesterday 8/10/17. Dr. Carty came to the conclusion that nothing should be done at this time as pt is stable and gaining weight.     Pt stated that when he discussed needing a colonoscopy, Dr. Carty told pt he would be able to take him on as a pt and perform colonoscopy. Pt stated that he will begin thinking about going to Dr. Carty in the future, should his insurance cover it.    Pt just wanted to update NOHEMI Valdez about the recent visit and what was discussed.    KD

## 2017-08-29 ENCOUNTER — OFFICE VISIT (OUTPATIENT)
Dept: INTERNAL MEDICINE | Age: 72
End: 2017-08-29

## 2017-08-29 VITALS
HEART RATE: 62 BPM | DIASTOLIC BLOOD PRESSURE: 70 MMHG | OXYGEN SATURATION: 98 % | BODY MASS INDEX: 25.49 KG/M2 | WEIGHT: 205 LBS | TEMPERATURE: 98.4 F | HEIGHT: 75 IN | SYSTOLIC BLOOD PRESSURE: 158 MMHG

## 2017-08-29 DIAGNOSIS — N49.2 ABSCESS, SCROTUM: Primary | ICD-10-CM

## 2017-08-29 DIAGNOSIS — D84.9 IMMUNOSUPPRESSED STATUS (HCC): ICD-10-CM

## 2017-08-29 PROCEDURE — 99213 OFFICE O/P EST LOW 20 MIN: CPT | Performed by: NURSE PRACTITIONER

## 2017-08-29 RX ORDER — SULFAMETHOXAZOLE AND TRIMETHOPRIM 800; 160 MG/1; MG/1
1 TABLET ORAL 2 TIMES DAILY
Qty: 20 TABLET | Refills: 0 | Status: SHIPPED | OUTPATIENT
Start: 2017-08-29 | End: 2017-09-08

## 2017-08-29 NOTE — PROGRESS NOTES
Subjective   Anthony Gallegos is a 72 y.o. male.     Testicle Pain   The patient's primary symptoms include genital lesions (lesion on scrotum) and testicular pain. This is a new problem. The current episode started in the past 7 days. The problem has been gradually worsening. The patient is experiencing no pain. Associated symptoms include a rash. Pertinent negatives include no chills, dysuria, fever, frequency or urgency. The color of the testicles is red. He has tried nothing for the symptoms. The treatment provided no relief. He is sexually active. No, his partner does not have an STD. There is no history of herpes simplex, HIV, syphilis or varicocele. (On immunosupressant medications)        The following portions of the patient's history were reviewed and updated as appropriate: allergies, current medications, past family history, past medical history, past social history, past surgical history and problem list.    Review of Systems   Constitutional: Negative for chills, fatigue and fever.   Genitourinary: Positive for genital sores and testicular pain. Negative for dysuria, frequency and urgency.   Skin: Positive for rash.       Objective   Physical Exam   Constitutional: He is oriented to person, place, and time. Vital signs are normal. He appears well-developed and well-nourished. He is cooperative. He does not appear ill. No distress.   Genitourinary:         Neurological: He is alert and oriented to person, place, and time.   Skin: Skin is warm, dry and intact.   Psychiatric: He has a normal mood and affect. His speech is normal and behavior is normal. Judgment and thought content normal. Cognition and memory are normal.   Nursing note and vitals reviewed.      Assessment/Plan   Problems Addressed this Visit     None      Visit Diagnoses     Abscess, scrotum    -  Primary    Relevant Medications    sulfamethoxazole-trimethoprim (BACTRIM DS) 800-160 MG per tablet    Other Relevant Orders    Ambulatory  Referral to General Surgery    Immunosuppressed status        Relevant Medications    sulfamethoxazole-trimethoprim (BACTRIM DS) 800-160 MG per tablet    Other Relevant Orders    Ambulatory Referral to General Surgery        1. Abscess, scrotum  Abscess to scrotum, will likely require incision and drainage. Patient denies previous history of MRSA, although he does have exposure to it.  I will start him on Bactrim, urgent referral placed to surgery within the next 1-2 days for likely incision and drainage.  Instructed patient not to take his weekly schedule methotrexate this weekend while on the antibiotic.    - sulfamethoxazole-trimethoprim (BACTRIM DS) 800-160 MG per tablet; Take 1 tablet by mouth 2 (Two) Times a Day for 10 days.  Dispense: 20 tablet; Refill: 0  - Ambulatory Referral to General Surgery    2. Immunosuppressed status    - sulfamethoxazole-trimethoprim (BACTRIM DS) 800-160 MG per tablet; Take 1 tablet by mouth 2 (Two) Times a Day for 10 days.  Dispense: 20 tablet; Refill: 0  - Ambulatory Referral to General Surgery

## 2017-08-29 NOTE — PATIENT INSTRUCTIONS
DO NOT TAKE METHOTREXATE THIS WEEKEND WHILE TAKING THE ANTIBIOTIC.       Abscess  An abscess is an infected area that contains a collection of pus and debris. It can occur in almost any part of the body. An abscess is also known as a furuncle or boil.  CAUSES   An abscess occurs when tissue gets infected. This can occur from blockage of oil or sweat glands, infection of hair follicles, or a minor injury to the skin. As the body tries to fight the infection, pus collects in the area and creates pressure under the skin. This pressure causes pain. People with weakened immune systems have difficulty fighting infections and get certain abscesses more often.   SYMPTOMS  Usually an abscess develops on the skin and becomes a painful mass that is red, warm, and tender. If the abscess forms under the skin, you may feel a moveable soft area under the skin. Some abscesses break open (rupture) on their own, but most will continue to get worse without care. The infection can spread deeper into the body and eventually into the bloodstream, causing you to feel ill.   DIAGNOSIS   Your caregiver will take your medical history and perform a physical exam. A sample of fluid may also be taken from the abscess to determine what is causing your infection.  TREATMENT   Your caregiver may prescribe antibiotic medicines to fight the infection. However, taking antibiotics alone usually does not cure an abscess. Your caregiver may need to make a small cut (incision) in the abscess to drain the pus. In some cases, gauze is packed into the abscess to reduce pain and to continue draining the area.  HOME CARE INSTRUCTIONS   · Only take over-the-counter or prescription medicines for pain, discomfort, or fever as directed by your caregiver.  · If you were prescribed antibiotics, take them as directed. Finish them even if you start to feel better.  · If gauze is used, follow your caregiver's directions for changing the gauze.  · To avoid spreading  the infection:    Keep your draining abscess covered with a bandage.    Wash your hands well.    Do not share personal care items, towels, or whirlpools with others.    Avoid skin contact with others.  · Keep your skin and clothes clean around the abscess.  · Keep all follow-up appointments as directed by your caregiver.  SEEK MEDICAL CARE IF:   · You have increased pain, swelling, redness, fluid drainage, or bleeding.  · You have muscle aches, chills, or a general ill feeling.  · You have a fever.  MAKE SURE YOU:   · Understand these instructions.  · Will watch your condition.  · Will get help right away if you are not doing well or get worse.     This information is not intended to replace advice given to you by your health care provider. Make sure you discuss any questions you have with your health care provider.     Document Released: 09/27/2006 Document Revised: 06/18/2013 Document Reviewed: 10/26/2016  [employer] Patient Education ©2017 [employer]

## 2017-08-30 ENCOUNTER — OFFICE VISIT (OUTPATIENT)
Dept: SURGERY | Facility: CLINIC | Age: 72
End: 2017-08-30

## 2017-08-30 VITALS
OXYGEN SATURATION: 99 % | BODY MASS INDEX: 25.02 KG/M2 | DIASTOLIC BLOOD PRESSURE: 78 MMHG | HEIGHT: 75 IN | HEART RATE: 70 BPM | SYSTOLIC BLOOD PRESSURE: 140 MMHG | WEIGHT: 201.2 LBS

## 2017-08-30 DIAGNOSIS — L02.818 CUTANEOUS ABSCESS OF OTHER SITE: Primary | ICD-10-CM

## 2017-08-30 DIAGNOSIS — L02.91 CUTANEOUS ABSCESS, UNSPECIFIED SITE: Primary | ICD-10-CM

## 2017-08-30 PROCEDURE — 87147 CULTURE TYPE IMMUNOLOGIC: CPT | Performed by: SURGERY

## 2017-08-30 PROCEDURE — 87070 CULTURE OTHR SPECIMN AEROBIC: CPT | Performed by: SURGERY

## 2017-08-30 PROCEDURE — 87205 SMEAR GRAM STAIN: CPT | Performed by: SURGERY

## 2017-08-30 PROCEDURE — 87186 SC STD MICRODIL/AGAR DIL: CPT | Performed by: SURGERY

## 2017-08-30 PROCEDURE — 10060 I&D ABSCESS SIMPLE/SINGLE: CPT | Performed by: SURGERY

## 2017-08-30 RX ORDER — PREDNISONE 10 MG/1
TABLET ORAL
Qty: 120 TABLET | Refills: 0 | Status: SHIPPED | OUTPATIENT
Start: 2017-08-30 | End: 2017-09-25 | Stop reason: SDUPTHER

## 2017-08-30 NOTE — PROGRESS NOTES
"Chief Complaint   Patient presents with   • Abscess     scrotum        Patient is a 72 y.o. male referred by NOHEMI López for Evaluation of a scrotal abscess.  Patient reports he thinks that it started with the scratching bowel 3 weeks ago.  Patient is on methotrexate and prednisone.  Patient reports his scrotum is painful.  Patient has been experiencing this now for several days.  Patient is on antibiotics without relief.  Patient denies fever or chills.  Patient has never had this in the past.     Past Medical History:   Diagnosis Date   • Abscess of scrotum    • Arthritis    • AS (ankylosing spondylitis)    • Hypertension    • Uveitis      Past Surgical History:   Procedure Laterality Date   • COLONOSCOPY     • ROTATOR CUFF REPAIR Right    • TOTAL HIP ARTHROPLASTY Left 2014     Family History   Problem Relation Age of Onset   • Alzheimer's disease Mother      Social History   Substance Use Topics   • Smoking status: Never Smoker   • Smokeless tobacco: Never Used   • Alcohol use No         Current Outpatient Prescriptions:   •  atenolol-chlorthalidone (TENORETIC) 50-25 MG per tablet, Take 1 tablet by mouth Daily., Disp: , Rfl:   •  METHOTREXATE PO, Take 5 mg by mouth Daily. 5mg daily x 3 days (usually Fri-Sun), Disp: , Rfl:   •  Omega-3 Fatty Acids (OMEGA 3 PO), Take  by mouth., Disp: , Rfl:   •  predniSONE (DELTASONE) 10 MG tablet, TAKE 4 TABLETS BY MOUTH DAILY, Disp: 120 tablet, Rfl: 0  •  sulfamethoxazole-trimethoprim (BACTRIM DS) 800-160 MG per tablet, Take 1 tablet by mouth 2 (Two) Times a Day for 10 days., Disp: 20 tablet, Rfl: 0    Review of Systems   Musculoskeletal: Positive for arthralgias, back pain, joint swelling and neck stiffness.   All other systems reviewed and are negative.    All other systems have been reviewed and are negative.  Vitals:    08/30/17 1459   BP: 140/78   Pulse: 70   SpO2: 99%   Weight: 201 lb 3.2 oz (91.3 kg)   Height: 75\" (190.5 cm)       Physical " Exam  General/physcological:   Alert and oriented x3 in no acute distress  HEENT: Normal cephalic, atraumatic, PERRLA, EOMI, sclera anicteric, moist mucous membranes, neck is supple, no JVD, no carotid bruits, no thyromegaly no adenopathy  Respiratory: CTA and percussion  CVA: RRR, normal S1-S2, no murmurs, no gallops or rubs  GI: Positive BS, soft, nondistended, nontender, no rebound, no guarding, no hernias, no organomegaly and no masses  Musculoskeletal: Full range of motion, no clubbing, no cyanosis or edema  Neurovascular: Grossly intact    Patient does not use tobacco products currently and I have counseled the patient to not start using tobacco products in the future.    Assessment:  Scrotal abscess  Plan:  I have recommended that the patient undergo a 90 of the abscess.  Patient wishes to proceed.    Ivelisse Woo MD  General, Minimally Invasive and Endoscopic Surgery  [facility]    [address], [address]  [place], [place], [address]  P: [phone]  F: [phone]    Cc:  NOHEMI López

## 2017-08-30 NOTE — PROGRESS NOTES
Procedure   Procedures    Procedure:  After the patient was consented, patient was positioned in the supine position and the scrotal area was prepped with alcohol and anesthetized with 1% Xylocaine with epinephrine.  Using an 11 blade scalpel, the fluctuant area of the abscess was incised with copious amounts of purulent material expressed.  Wound cultures were obtained and submitted to microbiology.  The incision was extended to approximately 1 cm.  The abscess cavity was irrigated with hydrogen peroxide and packed with quarter inch iodoform.  A sterile dressing was applied.  The patient was instructed on removing packing in a.m. and completing antibiotics.

## 2017-09-02 LAB
BACTERIA SPEC AEROBE CULT: ABNORMAL
GRAM STN SPEC: ABNORMAL
GRAM STN SPEC: ABNORMAL

## 2017-09-05 ENCOUNTER — TELEPHONE (OUTPATIENT)
Dept: INTERNAL MEDICINE | Age: 72
End: 2017-09-05

## 2017-09-25 DIAGNOSIS — M45.2 ANKYLOSING SPONDYLITIS OF CERVICAL REGION (HCC): Primary | Chronic | ICD-10-CM

## 2017-09-25 RX ORDER — PREDNISONE 10 MG/1
TABLET ORAL
Qty: 120 TABLET | Refills: 0 | Status: SHIPPED | OUTPATIENT
Start: 2017-09-25 | End: 2017-10-19 | Stop reason: SDUPTHER

## 2017-10-19 ENCOUNTER — OFFICE VISIT (OUTPATIENT)
Dept: INTERNAL MEDICINE | Facility: CLINIC | Age: 72
End: 2017-10-19

## 2017-10-19 VITALS
SYSTOLIC BLOOD PRESSURE: 147 MMHG | HEART RATE: 54 BPM | BODY MASS INDEX: 26.11 KG/M2 | RESPIRATION RATE: 16 BRPM | OXYGEN SATURATION: 98 % | WEIGHT: 210 LBS | DIASTOLIC BLOOD PRESSURE: 77 MMHG | HEIGHT: 75 IN | TEMPERATURE: 98.2 F

## 2017-10-19 DIAGNOSIS — N28.89 HYPERTENSION SECONDARY TO OTHER RENAL DISORDERS: ICD-10-CM

## 2017-10-19 DIAGNOSIS — H53.69 DIMINISHED NIGHT VISION: ICD-10-CM

## 2017-10-19 DIAGNOSIS — H20.9 UVEITIS: ICD-10-CM

## 2017-10-19 DIAGNOSIS — I15.1 HYPERTENSION SECONDARY TO OTHER RENAL DISORDERS: ICD-10-CM

## 2017-10-19 DIAGNOSIS — M45.2 ANKYLOSING SPONDYLITIS OF CERVICAL REGION (HCC): Primary | Chronic | ICD-10-CM

## 2017-10-19 PROBLEM — I10 HYPERTENSION: Status: ACTIVE | Noted: 2017-10-19

## 2017-10-19 PROCEDURE — 99214 OFFICE O/P EST MOD 30 MIN: CPT | Performed by: FAMILY MEDICINE

## 2017-10-19 RX ORDER — ATENOLOL AND CHLORTHALIDONE TABLET 50; 25 MG/1; MG/1
1 TABLET ORAL DAILY
Qty: 90 TABLET | Refills: 1 | Status: SHIPPED | OUTPATIENT
Start: 2017-10-19 | End: 2018-04-14 | Stop reason: SDUPTHER

## 2017-10-19 RX ORDER — PREDNISONE 10 MG/1
TABLET ORAL
Qty: 120 TABLET | Refills: 2 | Status: SHIPPED | OUTPATIENT
Start: 2017-10-19 | End: 2018-01-23 | Stop reason: DRUGHIGH

## 2017-10-19 NOTE — PROGRESS NOTES
Subjective   Anthony Gallegos is a 72 y.o. male.     Chief Complaint   Patient presents with   • Ankylosing spondylitis   • Hypertension         History of Present Illness   Patient is delightful gentleman with a history of ankylosing spondylitis affecting the cervical spine causing extreme stiffness.  He is seeing Dr. Douglas Fields and associates Neisha Stack with the treatment including ANA Wood he is also on prednisone 10 mg total of 40 mg every morning since June.  We discussed tapering this medication.  Otherwise he takes the methotrexate.  He is in need of seeing an ophthalmologist and he will tell us who needs to see.  There is a history of uveitis.      The following portions of the patient's history were reviewed and updated as appropriate: allergies, current medications, past social history and problem list.    Review of Systems   Constitutional: Negative.    HENT: Negative.    Eyes: Negative.    Respiratory: Negative.    Cardiovascular: Negative.    Gastrointestinal: Negative.    Endocrine: Negative.    Genitourinary: Negative.    Musculoskeletal: Positive for arthralgias, neck pain and neck stiffness.   Skin: Negative.    Allergic/Immunologic: Negative.    Neurological: Negative.    Hematological: Negative.    Psychiatric/Behavioral: Negative.        Objective   Vitals:    10/19/17 1239   BP: 147/77   Pulse: 54   Resp: 16   Temp: 98.2 °F (36.8 °C)   SpO2: 98%     Physical Exam   Constitutional: He is oriented to person, place, and time. He appears well-developed and well-nourished.   HENT:   Head: Normocephalic and atraumatic.   Right Ear: Tympanic membrane and external ear normal.   Left Ear: Tympanic membrane and external ear normal.   Nose: Nose normal.   Mouth/Throat: Oropharynx is clear and moist.   Eyes: Conjunctivae and EOM are normal. Pupils are equal, round, and reactive to light.   Neck: No JVD present. Spinous process tenderness and muscular tenderness present. Rigidity present. Decreased range of  motion present. No thyromegaly present.   Cardiovascular: Normal rate, regular rhythm, normal heart sounds and intact distal pulses.    Pulmonary/Chest: Effort normal and breath sounds normal.   Abdominal: Soft. Bowel sounds are normal.   Lymphadenopathy:     He has no cervical adenopathy.   Neurological: He is alert and oriented to person, place, and time. No cranial nerve deficit. Coordination normal.   Skin: Skin is warm and dry. No rash noted.   Psychiatric: He has a normal mood and affect. His behavior is normal. Judgment and thought content normal.   Vitals reviewed.      Assessment/Plan   Problem List Items Addressed This Visit        Cardiovascular and Mediastinum    Hypertension    Relevant Medications    atenolol-chlorthalidone (TENORETIC) 50-25 MG per tablet    Other Relevant Orders    Urinalysis With / Microscopic If Indicated - Urine, Clean Catch       Musculoskeletal and Integument    Ankylosing spondylitis of cervical region - Primary (Chronic)    Relevant Medications    predniSONE (DELTASONE) 10 MG tablet    Other Relevant Orders    Urinalysis With / Microscopic If Indicated - Urine, Clean Catch      Other Visit Diagnoses     Diminished night vision        Relevant Orders    Urinalysis With / Microscopic If Indicated - Urine, Clean Catch    Uveitis        Relevant Orders    Urinalysis With / Microscopic If Indicated - Urine, Clean Catch      Plan: Refills on atenolol chlorthalidone 50/25 daily also refills and prednisone.  Recheck in 3 months sooner if needed.  He will call back about seeing the ophthalmologist.  Otherwise will get labs and next visit.  Check urinalysis today.

## 2017-10-20 LAB
APPEARANCE UR: CLEAR
BILIRUB UR QL STRIP: NEGATIVE
COLOR UR: YELLOW
GLUCOSE UR QL: NEGATIVE
HGB UR QL STRIP: NEGATIVE
KETONES UR QL STRIP: NEGATIVE
LEUKOCYTE ESTERASE UR QL STRIP: NEGATIVE
NITRITE UR QL STRIP: NEGATIVE
PH UR STRIP: 6 [PH] (ref 5–8)
PROT UR QL STRIP: NEGATIVE
SP GR UR: 1.02 (ref 1–1.03)
UROBILINOGEN UR STRIP-MCNC: NORMAL MG/DL

## 2017-12-19 DIAGNOSIS — M45.2 ANKYLOSING SPONDYLITIS OF CERVICAL REGION (HCC): Chronic | ICD-10-CM

## 2017-12-19 RX ORDER — PREDNISONE 10 MG/1
TABLET ORAL
Qty: 120 TABLET | Refills: 0 | OUTPATIENT
Start: 2017-12-19

## 2017-12-26 ENCOUNTER — APPOINTMENT (OUTPATIENT)
Dept: CARDIOLOGY | Facility: HOSPITAL | Age: 72
End: 2017-12-26

## 2017-12-26 ENCOUNTER — HOSPITAL ENCOUNTER (EMERGENCY)
Facility: HOSPITAL | Age: 72
Discharge: HOME | End: 2017-12-26
Attending: EMERGENCY MEDICINE | Admitting: EMERGENCY MEDICINE

## 2017-12-26 ENCOUNTER — APPOINTMENT (OUTPATIENT)
Dept: GENERAL RADIOLOGY | Facility: HOSPITAL | Age: 72
End: 2017-12-26

## 2017-12-26 VITALS
WEIGHT: 220 LBS | DIASTOLIC BLOOD PRESSURE: 75 MMHG | TEMPERATURE: 98.2 F | OXYGEN SATURATION: 93 % | BODY MASS INDEX: 27.35 KG/M2 | RESPIRATION RATE: 16 BRPM | HEIGHT: 75 IN | HEART RATE: 60 BPM | SYSTOLIC BLOOD PRESSURE: 125 MMHG

## 2017-12-26 DIAGNOSIS — L03.115 CELLULITIS OF RIGHT LOWER EXTREMITY: Primary | ICD-10-CM

## 2017-12-26 LAB
ALBUMIN SERPL-MCNC: 3.6 G/DL (ref 3.5–5.2)
ALBUMIN/GLOB SERPL: 0.8 G/DL
ALP SERPL-CCNC: 46 U/L (ref 39–117)
ALT SERPL W P-5'-P-CCNC: 25 U/L (ref 1–41)
ANION GAP SERPL CALCULATED.3IONS-SCNC: 11.1 MMOL/L
AST SERPL-CCNC: 59 U/L (ref 1–40)
BASOPHILS # BLD AUTO: 0.02 10*3/MM3 (ref 0–0.2)
BASOPHILS NFR BLD AUTO: 0.1 % (ref 0–1.5)
BH CV LOWER VASCULAR LEFT COMMON FEMORAL AUGMENT: NORMAL
BH CV LOWER VASCULAR LEFT COMMON FEMORAL COMPETENT: NORMAL
BH CV LOWER VASCULAR LEFT COMMON FEMORAL COMPRESS: NORMAL
BH CV LOWER VASCULAR LEFT COMMON FEMORAL PHASIC: NORMAL
BH CV LOWER VASCULAR LEFT COMMON FEMORAL SPONT: NORMAL
BH CV LOWER VASCULAR RIGHT COMMON FEMORAL AUGMENT: NORMAL
BH CV LOWER VASCULAR RIGHT COMMON FEMORAL COMPETENT: NORMAL
BH CV LOWER VASCULAR RIGHT COMMON FEMORAL COMPRESS: NORMAL
BH CV LOWER VASCULAR RIGHT COMMON FEMORAL PHASIC: NORMAL
BH CV LOWER VASCULAR RIGHT COMMON FEMORAL SPONT: NORMAL
BH CV LOWER VASCULAR RIGHT DISTAL FEMORAL COMPRESS: NORMAL
BH CV LOWER VASCULAR RIGHT GASTRONEMIUS COMPRESS: NORMAL
BH CV LOWER VASCULAR RIGHT GREATER SAPH AK COMPRESS: NORMAL
BH CV LOWER VASCULAR RIGHT GREATER SAPH BK COMPRESS: NORMAL
BH CV LOWER VASCULAR RIGHT LESSER SAPH COMPRESS: NORMAL
BH CV LOWER VASCULAR RIGHT MID FEMORAL AUGMENT: NORMAL
BH CV LOWER VASCULAR RIGHT MID FEMORAL COMPETENT: NORMAL
BH CV LOWER VASCULAR RIGHT MID FEMORAL COMPRESS: NORMAL
BH CV LOWER VASCULAR RIGHT MID FEMORAL PHASIC: NORMAL
BH CV LOWER VASCULAR RIGHT MID FEMORAL SPONT: NORMAL
BH CV LOWER VASCULAR RIGHT PERONEAL COMPRESS: NORMAL
BH CV LOWER VASCULAR RIGHT POPLITEAL AUGMENT: NORMAL
BH CV LOWER VASCULAR RIGHT POPLITEAL COMPETENT: NORMAL
BH CV LOWER VASCULAR RIGHT POPLITEAL COMPRESS: NORMAL
BH CV LOWER VASCULAR RIGHT POPLITEAL PHASIC: NORMAL
BH CV LOWER VASCULAR RIGHT POPLITEAL SPONT: NORMAL
BH CV LOWER VASCULAR RIGHT POSTERIOR TIBIAL COMPRESS: NORMAL
BH CV LOWER VASCULAR RIGHT PROXIMAL FEMORAL COMPRESS: NORMAL
BH CV LOWER VASCULAR RIGHT SAPHENOFEMORAL JUNCTION AUGMENT: NORMAL
BH CV LOWER VASCULAR RIGHT SAPHENOFEMORAL JUNCTION COMPETENT: NORMAL
BH CV LOWER VASCULAR RIGHT SAPHENOFEMORAL JUNCTION COMPRESS: NORMAL
BH CV LOWER VASCULAR RIGHT SAPHENOFEMORAL JUNCTION PHASIC: NORMAL
BH CV LOWER VASCULAR RIGHT SAPHENOFEMORAL JUNCTION SPONT: NORMAL
BILIRUB SERPL-MCNC: 0.7 MG/DL (ref 0.1–1.2)
BUN BLD-MCNC: 17 MG/DL (ref 8–23)
BUN/CREAT SERPL: 16.2 (ref 7–25)
CALCIUM SPEC-SCNC: 9.7 MG/DL (ref 8.6–10.5)
CHLORIDE SERPL-SCNC: 99 MMOL/L (ref 98–107)
CO2 SERPL-SCNC: 31.9 MMOL/L (ref 22–29)
CREAT BLD-MCNC: 1.05 MG/DL (ref 0.76–1.27)
D-LACTATE SERPL-SCNC: 2 MMOL/L (ref 0.5–2)
DEPRECATED RDW RBC AUTO: 53.9 FL (ref 37–54)
EOSINOPHIL # BLD AUTO: 0.03 10*3/MM3 (ref 0–0.7)
EOSINOPHIL NFR BLD AUTO: 0.2 % (ref 0.3–6.2)
ERYTHROCYTE [DISTWIDTH] IN BLOOD BY AUTOMATED COUNT: 14.6 % (ref 11.5–14.5)
GFR SERPL CREATININE-BSD FRML MDRD: 84 ML/MIN/1.73
GLOBULIN UR ELPH-MCNC: 4.3 GM/DL
GLUCOSE BLD-MCNC: 118 MG/DL (ref 65–99)
HCT VFR BLD AUTO: 43.7 % (ref 40.4–52.2)
HGB BLD-MCNC: 13.8 G/DL (ref 13.7–17.6)
HOLD SPECIMEN: NORMAL
HOLD SPECIMEN: NORMAL
IMM GRANULOCYTES # BLD: 0.03 10*3/MM3 (ref 0–0.03)
IMM GRANULOCYTES NFR BLD: 0.2 % (ref 0–0.5)
LYMPHOCYTES # BLD AUTO: 0.8 10*3/MM3 (ref 0.9–4.8)
LYMPHOCYTES NFR BLD AUTO: 5.7 % (ref 19.6–45.3)
MCH RBC QN AUTO: 32.1 PG (ref 27–32.7)
MCHC RBC AUTO-ENTMCNC: 31.6 G/DL (ref 32.6–36.4)
MCV RBC AUTO: 101.6 FL (ref 79.8–96.2)
MONOCYTES # BLD AUTO: 1.25 10*3/MM3 (ref 0.2–1.2)
MONOCYTES NFR BLD AUTO: 8.9 % (ref 5–12)
NEUTROPHILS # BLD AUTO: 11.93 10*3/MM3 (ref 1.9–8.1)
NEUTROPHILS NFR BLD AUTO: 84.9 % (ref 42.7–76)
PLATELET # BLD AUTO: 241 10*3/MM3 (ref 140–500)
PMV BLD AUTO: 10 FL (ref 6–12)
POTASSIUM BLD-SCNC: 3.8 MMOL/L (ref 3.5–5.2)
PROT SERPL-MCNC: 7.9 G/DL (ref 6–8.5)
RBC # BLD AUTO: 4.3 10*6/MM3 (ref 4.6–6)
SODIUM BLD-SCNC: 142 MMOL/L (ref 136–145)
WBC NRBC COR # BLD: 14.06 10*3/MM3 (ref 4.5–10.7)
WHOLE BLOOD HOLD SPECIMEN: NORMAL
WHOLE BLOOD HOLD SPECIMEN: NORMAL

## 2017-12-26 PROCEDURE — 80053 COMPREHEN METABOLIC PANEL: CPT | Performed by: EMERGENCY MEDICINE

## 2017-12-26 PROCEDURE — 99284 EMERGENCY DEPT VISIT MOD MDM: CPT

## 2017-12-26 PROCEDURE — 83605 ASSAY OF LACTIC ACID: CPT | Performed by: NURSE PRACTITIONER

## 2017-12-26 PROCEDURE — 85025 COMPLETE CBC W/AUTO DIFF WBC: CPT | Performed by: EMERGENCY MEDICINE

## 2017-12-26 PROCEDURE — 87040 BLOOD CULTURE FOR BACTERIA: CPT | Performed by: NURSE PRACTITIONER

## 2017-12-26 PROCEDURE — 96365 THER/PROPH/DIAG IV INF INIT: CPT

## 2017-12-26 PROCEDURE — 93971 EXTREMITY STUDY: CPT

## 2017-12-26 PROCEDURE — 73610 X-RAY EXAM OF ANKLE: CPT

## 2017-12-26 PROCEDURE — 36415 COLL VENOUS BLD VENIPUNCTURE: CPT | Performed by: EMERGENCY MEDICINE

## 2017-12-26 RX ORDER — CLINDAMYCIN PHOSPHATE 600 MG/50ML
600 INJECTION INTRAVENOUS ONCE
Status: COMPLETED | OUTPATIENT
Start: 2017-12-26 | End: 2017-12-26

## 2017-12-26 RX ORDER — CLINDAMYCIN HYDROCHLORIDE 300 MG/1
300 CAPSULE ORAL 3 TIMES DAILY
Qty: 30 CAPSULE | Refills: 0 | Status: SHIPPED | OUTPATIENT
Start: 2017-12-26 | End: 2018-01-23

## 2017-12-26 RX ADMIN — CLINDAMYCIN PHOSPHATE 600 MG: 12 INJECTION, SOLUTION INTRAMUSCULAR; INTRAVENOUS at 18:13

## 2017-12-28 ENCOUNTER — TELEPHONE (OUTPATIENT)
Dept: SOCIAL WORK | Facility: HOSPITAL | Age: 72
End: 2017-12-28

## 2017-12-31 LAB
BACTERIA SPEC AEROBE CULT: NORMAL
BACTERIA SPEC AEROBE CULT: NORMAL

## 2018-01-23 ENCOUNTER — OFFICE VISIT (OUTPATIENT)
Dept: INTERNAL MEDICINE | Facility: CLINIC | Age: 73
End: 2018-01-23

## 2018-01-23 VITALS
BODY MASS INDEX: 27.87 KG/M2 | SYSTOLIC BLOOD PRESSURE: 116 MMHG | HEART RATE: 58 BPM | OXYGEN SATURATION: 97 % | TEMPERATURE: 97.1 F | WEIGHT: 223 LBS | DIASTOLIC BLOOD PRESSURE: 68 MMHG

## 2018-01-23 DIAGNOSIS — M45.2 ANKYLOSING SPONDYLITIS OF CERVICAL REGION (HCC): Primary | Chronic | ICD-10-CM

## 2018-01-23 DIAGNOSIS — Z12.11 COLON CANCER SCREENING: ICD-10-CM

## 2018-01-23 DIAGNOSIS — N28.89 HYPERTENSION SECONDARY TO OTHER RENAL DISORDERS: ICD-10-CM

## 2018-01-23 DIAGNOSIS — S90.819A ABRASION, FOOT W/O INFECTION: ICD-10-CM

## 2018-01-23 DIAGNOSIS — L03.115 CELLULITIS OF RIGHT LOWER EXTREMITY: ICD-10-CM

## 2018-01-23 DIAGNOSIS — I15.1 HYPERTENSION SECONDARY TO OTHER RENAL DISORDERS: ICD-10-CM

## 2018-01-23 PROCEDURE — 99214 OFFICE O/P EST MOD 30 MIN: CPT | Performed by: FAMILY MEDICINE

## 2018-01-23 RX ORDER — COVID-19 ANTIGEN TEST
220 KIT MISCELLANEOUS DAILY
COMMUNITY

## 2018-01-23 RX ORDER — PREDNISONE 1 MG/1
TABLET ORAL
Refills: 2 | COMMUNITY
Start: 2018-01-04

## 2018-01-23 NOTE — PROGRESS NOTES
Subjective   Anthony Gallegos is a 72 y.o. male.     Chief Complaint   Patient presents with   • Hypertension   • Back Pain   • Cellulitis   • GI Problem         History of Present Illness   Mr. Cruz returns for recheck.  History of cellulitis of the right leg in the emergency room on December 2060 seems to recover from that.  He is also interested clindamycin had a prior history of wound positive for MRSA.  He is to follow-up with Dr. Fields regarding ankylosing spondylitis.    I reviewed his current medications and he has symptoms of gastritis occasionally last week with nausea vomiting ×2 in 2 separate nights.  He does take over-the-counter Nexium I instructed him to continue taking it.  He is in need of a redo colonoscopy and we'll help set that up.    He had some blood in the left heel but apparently stepped on something and split the left heel which now looks to be pretty clean with some liquid Band-Aid.  It sounds like he may have some type of neuropathy.      The following portions of the patient's history were reviewed and updated as appropriate: allergies, current medications, past social history and problem list.    Review of Systems   Constitutional: Negative.    HENT: Negative.    Eyes: Negative.    Respiratory: Negative.    Cardiovascular: Negative.    Gastrointestinal: Negative.    Endocrine: Negative.    Genitourinary: Negative.    Musculoskeletal: Positive for arthralgias, back pain and gait problem.   Skin: Negative.    Allergic/Immunologic: Negative.    Hematological: Negative.    Psychiatric/Behavioral: Negative.        Objective   Vitals:    01/23/18 1414   BP: 116/68   Pulse: 58   Temp: 97.1 °F (36.2 °C)   SpO2: 97%     Physical Exam    Assessment/Plan   Problem List Items Addressed This Visit        Cardiovascular and Mediastinum    Hypertension       Musculoskeletal and Integument    Ankylosing spondylitis of cervical region - Primary (Chronic)      Other Visit Diagnoses     Cellulitis of  right lower extremity        Colon cancer screening        Relevant Orders    Ambulatory Referral For Screening Colonoscopy    Abrasion, foot w/o infection          Plan: Continue care of the feet is a look pretty good today will see him back in 3 months for Medicare wellness physical referral for colonoscopy continue current medications.  Also continue over-the-counter Nexium.

## 2018-02-07 ENCOUNTER — TELEPHONE (OUTPATIENT)
Dept: GASTROENTEROLOGY | Facility: CLINIC | Age: 73
End: 2018-02-07

## 2018-02-07 NOTE — TELEPHONE ENCOUNTER
----- Message from Nan Figueroa sent at 2/7/2018  1:11 PM EST -----  Regarding: open access colonoscopy  Contact: [phone]  Pt stopped in office today and would like to have a colonoscopy with Dr. Carty.  His contact # is [phone]  Called patient he wanted office appointment. Scheduled appointment mailed  reminder.

## 2018-02-12 ENCOUNTER — TELEPHONE (OUTPATIENT)
Dept: INTERNAL MEDICINE | Facility: CLINIC | Age: 73
End: 2018-02-12

## 2018-02-13 DIAGNOSIS — M45.2 ANKYLOSING SPONDYLITIS OF CERVICAL REGION (HCC): Chronic | ICD-10-CM

## 2018-02-13 RX ORDER — PREDNISONE 10 MG/1
TABLET ORAL
Qty: 120 TABLET | Refills: 2 | Status: SHIPPED | OUTPATIENT
Start: 2018-02-13 | End: 2018-03-27

## 2018-03-22 ENCOUNTER — OFFICE VISIT (OUTPATIENT)
Dept: INTERNAL MEDICINE | Facility: CLINIC | Age: 73
End: 2018-03-22

## 2018-03-22 VITALS
TEMPERATURE: 97.7 F | RESPIRATION RATE: 16 BRPM | HEIGHT: 75 IN | OXYGEN SATURATION: 98 % | BODY MASS INDEX: 27.7 KG/M2 | DIASTOLIC BLOOD PRESSURE: 66 MMHG | HEART RATE: 64 BPM | WEIGHT: 222.8 LBS | SYSTOLIC BLOOD PRESSURE: 110 MMHG

## 2018-03-22 DIAGNOSIS — L02.92 BOIL: Primary | ICD-10-CM

## 2018-03-22 PROCEDURE — 99214 OFFICE O/P EST MOD 30 MIN: CPT | Performed by: NURSE PRACTITIONER

## 2018-03-22 NOTE — PROGRESS NOTES
Subjective   Anthony Gallegos is a 73 y.o. male.     Chief Complaint   Patient presents with   • Other   • Suspicious Skin Lesion         El is a known patient to our practice with history of hypertension.  He presents today with a 3 cm x 3 cm lesion above his left nipple.  Upon further investigation this is noted to be a boil.  He notes that he has had a similar lesion underneath his left arm.  This is his main complaint no other reports of pain or tenderness.  He reports the lesion is tender but not exactly painful.  He suspects it might have been aggravated by his clothing so he placed a Band-Aid over the lesion.  No exacerbating or aggravating symptoms at this time.  Of note during our does have a history of MRSA exposure.         The following portions of the patient's history were reviewed and updated as appropriate: allergies, current medications, past social history and problem list.    Review of Systems   Constitutional: Negative.    HENT: Negative.    Eyes: Negative.    Respiratory: Negative.    Cardiovascular: Negative.    Gastrointestinal: Negative.    Endocrine: Negative.    Genitourinary: Negative.    Allergic/Immunologic: Negative.        Objective   Vitals:    03/22/18 1613   BP: 110/66   Pulse: 64   Resp: 16   Temp: 97.7 °F (36.5 °C)   SpO2: 98%     Physical Exam   Constitutional: He appears well-developed and well-nourished.   HENT:   Head: Normocephalic.   Right Ear: External ear normal.   Left Ear: External ear normal.   Nose: Nose normal.   Mouth/Throat: Oropharynx is clear and moist.   Eyes: Conjunctivae and EOM are normal. Pupils are equal, round, and reactive to light.   Neck: Normal range of motion.   Cardiovascular: Normal rate, regular rhythm and normal heart sounds.    Pulmonary/Chest: Effort normal and breath sounds normal.   Skin:            Assessment/Plan   Problem List Items Addressed This Visit     Boil - Primary      Other Visit Diagnoses    None.            I was able to remove  a robust amount of purulent drainage from the boil itself.  We will start Mr. Gallegos on an antibiotic.  He is scheduled to see Dr. Pierre next week for a well visit.  At that time his clinical trajectory will be reassessed.

## 2018-03-23 RX ORDER — CLINDAMYCIN HYDROCHLORIDE 300 MG/1
300 CAPSULE ORAL 3 TIMES DAILY
Qty: 30 CAPSULE | Refills: 0 | Status: SHIPPED | OUTPATIENT
Start: 2018-03-23 | End: 2018-04-02

## 2018-03-27 ENCOUNTER — OFFICE VISIT (OUTPATIENT)
Dept: INTERNAL MEDICINE | Facility: CLINIC | Age: 73
End: 2018-03-27

## 2018-03-27 VITALS
TEMPERATURE: 97.3 F | OXYGEN SATURATION: 98 % | HEART RATE: 61 BPM | BODY MASS INDEX: 27.62 KG/M2 | DIASTOLIC BLOOD PRESSURE: 62 MMHG | WEIGHT: 221 LBS | SYSTOLIC BLOOD PRESSURE: 136 MMHG

## 2018-03-27 DIAGNOSIS — L02.92 BOIL: ICD-10-CM

## 2018-03-27 DIAGNOSIS — Z00.00 MEDICARE ANNUAL WELLNESS VISIT, SUBSEQUENT: Primary | ICD-10-CM

## 2018-03-27 DIAGNOSIS — S91.302D OPEN WOUND OF LEFT HEEL, SUBSEQUENT ENCOUNTER: ICD-10-CM

## 2018-03-27 DIAGNOSIS — N40.0 BENIGN PROSTATIC HYPERPLASIA, UNSPECIFIED WHETHER LOWER URINARY TRACT SYMPTOMS PRESENT: ICD-10-CM

## 2018-03-27 DIAGNOSIS — I10 ESSENTIAL HYPERTENSION: ICD-10-CM

## 2018-03-27 PROCEDURE — 99214 OFFICE O/P EST MOD 30 MIN: CPT | Performed by: FAMILY MEDICINE

## 2018-03-27 PROCEDURE — G0439 PPPS, SUBSEQ VISIT: HCPCS | Performed by: FAMILY MEDICINE

## 2018-03-27 NOTE — PROGRESS NOTES
QUICK REFERENCE INFORMATION:  The ABCs of the Annual Wellness Visit    Subsequent Medicare Wellness Visit    HEALTH RISK ASSESSMENT    1945    Recent Hospitalizations:  No hospitalization(s) within the last year..        Current Medical Providers:  Patient Care Team:  Marco Macedo Jr., MD as PCP - General (Family Medicine)        Smoking Status:  History   Smoking Status   • Never Smoker   Smokeless Tobacco   • Never Used       Alcohol Consumption:  History   Alcohol Use No       Depression Screen:   PHQ-2/PHQ-9 Depression Screening 3/27/2018   Little interest or pleasure in doing things 0   Feeling down, depressed, or hopeless 0   Trouble falling or staying asleep, or sleeping too much 0   Feeling tired or having little energy 0   Poor appetite or overeating 0   Feeling bad about yourself - or that you are a failure or have let yourself or your family down 0   Trouble concentrating on things, such as reading the newspaper or watching television 0   Moving or speaking so slowly that other people could have noticed. Or the opposite - being so fidgety or restless that you have been moving around a lot more than usual 0   Thoughts that you would be better off dead, or of hurting yourself in some way 0   Total Score 0   If you checked off any problems, how difficult have these problems made it for you to do your work, take care of things at home, or get along with other people? Not difficult at all       Health Habits and Functional and Cognitive Screening:  Functional & Cognitive Status 3/27/2018   Do you have difficulty preparing food and eating? No   Do you have difficulty bathing yourself, getting dressed or grooming yourself? No   Do you have difficulty using the toilet? No   Do you have difficulty moving around from place to place? No   Do you have trouble with steps or getting out of a bed or a chair? No   In the past year have you fallen or experienced a near fall? Yes   Current Diet Well Balanced Diet    Dental Exam Up to date   Eye Exam Up to date   Exercise (times per week) 7 times per week   Current Exercise Activities Include Walking   Do you need help using the phone?  No   Are you deaf or do you have serious difficulty hearing?  No   Do you need help with transportation? No   Do you need help shopping? No   Do you need help preparing meals?  No   Do you need help with housework?  No   Do you need help with laundry? No   Do you need help taking your medications? No   Do you need help managing money? No   Do you ever drive or ride in a car without wearing a seat belt? No   Have you felt unusual stress, anger or loneliness in the last month? No   Who do you live with? Other   If you need help, do you have trouble finding someone available to you? No   Have you been bothered in the last four weeks by sexual problems? No   Do you have difficulty concentrating, remembering or making decisions? No           Does the patient have evidence of cognitive impairment? No    Aspirin use counseling: Does not need ASA (and currently is not on it)      Recent Lab Results:  CMP:  Lab Results   Component Value Date     (H) 07/14/2017    BUN 17 12/26/2017    CREATININE 1.05 12/26/2017    EGFRIFNONA 82 07/14/2017    EGFRIFAFRI 84 12/26/2017    BCR 16.2 12/26/2017     12/26/2017    K 3.8 12/26/2017    CO2 31.9 (H) 12/26/2017    CALCIUM 9.7 12/26/2017    PROTENTOTREF 7.3 07/14/2017    ALBUMIN 3.60 12/26/2017    LABGLOBREF 3.4 07/14/2017    LABIL2 0.8 12/26/2017    BILITOT 0.7 12/26/2017    ALKPHOS 46 12/26/2017    AST 59 (H) 12/26/2017    ALT 25 12/26/2017     Lipid Panel:     HbA1c:       Visual Acuity:  No exam data present    Age-appropriate Screening Schedule:  Refer to the list below for future screening recommendations based on patient's age, sex and/or medical conditions. Orders for these recommended tests are listed in the plan section. The patient has been provided with a written plan.    Health Maintenance    Topic Date Due   • TDAP/TD VACCINES (1 - Tdap) 02/09/1964   • COLONOSCOPY  06/21/2017   • ZOSTER VACCINE  06/21/2017   • PNEUMOCOCCAL VACCINES (65+ LOW/MEDIUM RISK) (1 of 2 - PCV13) 10/16/2018 (Originally 2/9/2009)   • INFLUENZA VACCINE  Addressed        Subjective   History of Present Illness    Anthony Gallegos is a 73 y.o. male who presents for an Subsequent Wellness Visit.    The following portions of the patient's history were reviewed and updated as appropriate: allergies, current medications, past family history, past medical history, past social history, past surgical history and problem list.    Outpatient Medications Prior to Visit   Medication Sig Dispense Refill   • atenolol-chlorthalidone (TENORETIC) 50-25 MG per tablet Take 1 tablet by mouth Daily. 90 tablet 1   • clindamycin (CLEOCIN) 300 MG capsule Take 1 capsule by mouth 3 (Three) Times a Day for 10 days. 30 capsule 0   • esomeprazole (nexIUM) 20 MG capsule Take 20 mg by mouth Every Morning Before Breakfast.     • methotrexate 2.5 MG tablet TAKE 6 TABLETS BY MOUTH WEEKLY  5   • Naproxen Sodium 220 MG capsule Take 220 mg of amoxicillin by mouth Daily.     • Omega-3 Fatty Acids (OMEGA 3 PO) Take 1 capsule by mouth Daily.     • predniSONE (DELTASONE) 5 MG tablet TAKE THREE TABLETS DAILY  2   • predniSONE (DELTASONE) 10 MG tablet TAKE 4 TABLETS BY MOUTH IN THE MORNING WITH FOOD 120 tablet 2     No facility-administered medications prior to visit.        Patient Active Problem List   Diagnosis   • Ankylosing spondylitis of cervical region   • Recent unexplained weight loss   • Abnormal serum lipase level   • Abnormal serum level of amylase   • Hypertension   • Boil       Advance Care Planning:  has an advance directive - a copy HAS NOT been provided    Identification of Risk Factors:  Risk factors include: cardiovascular risk.    Review of Systems    Compared to one year ago, the patient feels his physical health is the same.  Compared to one year ago,  the patient feels his mental health is the same.    Objective     Physical Exam    Vitals:    03/27/18 1439   BP: 136/62   BP Location: Left arm   Patient Position: Sitting   Cuff Size: Adult   Pulse: 61   Temp: 97.3 °F (36.3 °C)   TempSrc: Tympanic   SpO2: 98%   Weight: 100 kg (221 lb)   PainSc:   1       Body mass index is 27.62 kg/m².  Discussed the patient's BMI with him. BMI is within normal parameters. No follow-up required.    Assessment/Plan   Patient Self-Management and Personalized Health Advice  The patient has been provided with information about: prevention of cardiac or vascular disease and preventive services including:   · Counseling for cardiovascular disease risk reduction.    Visit Diagnoses:    ICD-10-CM ICD-9-CM   1. Medicare annual wellness visit, subsequent Z00.00 V70.0   2. Boil L02.92 680.9   3. Open wound of left heel, subsequent encounter S91.302D V58.89     892.0       No orders of the defined types were placed in this encounter.      Outpatient Encounter Prescriptions as of 3/27/2018   Medication Sig Dispense Refill   • atenolol-chlorthalidone (TENORETIC) 50-25 MG per tablet Take 1 tablet by mouth Daily. 90 tablet 1   • clindamycin (CLEOCIN) 300 MG capsule Take 1 capsule by mouth 3 (Three) Times a Day for 10 days. 30 capsule 0   • esomeprazole (nexIUM) 20 MG capsule Take 20 mg by mouth Every Morning Before Breakfast.     • methotrexate 2.5 MG tablet TAKE 6 TABLETS BY MOUTH WEEKLY  5   • Naproxen Sodium 220 MG capsule Take 220 mg of amoxicillin by mouth Daily.     • Omega-3 Fatty Acids (OMEGA 3 PO) Take 1 capsule by mouth Daily.     • predniSONE (DELTASONE) 5 MG tablet TAKE THREE TABLETS DAILY  2   • [DISCONTINUED] predniSONE (DELTASONE) 10 MG tablet TAKE 4 TABLETS BY MOUTH IN THE MORNING WITH FOOD 120 tablet 2     No facility-administered encounter medications on file as of 3/27/2018.        Reviewed use of high risk medication in the elderly: not applicable  Reviewed for potential of  harmful drug interactions in the elderly: not applicable    Follow Up:  No Follow-up on file.     An After Visit Summary and PPPS with all of these plans were given to the patient.

## 2018-03-27 NOTE — PROGRESS NOTES
Subjective   Anthony Gallegos is a 73 y.o. male.     Chief Complaint   Patient presents with   • Annual Exam   • Hypertension   abscess, foot wound    History of Present Illness   Mr. Gallegos returns we did a Medicare wellness visit is well.  He has a history of MRSA and was seen last week with a boil at the left upper chest wall with some drainage improving on clindamycin.  He had a split on the heel the left foot was examined undressed today and will place him on mupirocin.  Otherwise screening labs are performed and the treatment of hypertension as well as ankylosing spondylitis is reviewed.  Spends several years since colonoscopy and that was in [place].  He had a history of adenomatous polyp.  Will defer colonoscopy till later in the year.  He is in life transition.      The following portions of the patient's history were reviewed and updated as appropriate: allergies, current medications, past social history and problem list.    Review of Systems   Constitutional: Negative.    Eyes: Negative.    Respiratory: Negative.    Cardiovascular: Negative.    Gastrointestinal: Negative.    Endocrine: Negative.    Genitourinary: Negative.    Musculoskeletal: Positive for arthralgias and back pain.   Allergic/Immunologic: Negative.    Neurological: Negative.    Hematological: Negative.    Psychiatric/Behavioral: Negative.        Objective   Vitals:    03/27/18 1439   BP: 136/62   Pulse: 61   Temp: 97.3 °F (36.3 °C)   SpO2: 98%     Physical Exam   Constitutional: He appears well-developed.   HENT:   Right Ear: External ear normal.   Left Ear: External ear normal.   Nose: Nose normal.   Mouth/Throat: Oropharynx is clear and moist.   Eyes: Conjunctivae are normal. Pupils are equal, round, and reactive to light.   Neck: Decreased range of motion present. No thyromegaly present.   Cardiovascular: Normal rate, regular rhythm and normal heart sounds.    No murmur heard.  Pulmonary/Chest: Effort normal and breath sounds normal.        Abdominal: Soft. Bowel sounds are normal.   Musculoskeletal:        Cervical back: He exhibits decreased range of motion.        Thoracic back: He exhibits decreased range of motion.        Lumbar back: He exhibits decreased range of motion.        Psychiatric: He has a normal mood and affect. His speech is normal and behavior is normal. Judgment and thought content normal. Cognition and memory are normal.       Assessment/Plan   Problem List Items Addressed This Visit        Cardiovascular and Mediastinum    Hypertension    Relevant Orders    CBC & Differential    Comprehensive Metabolic Panel    PSA, Total & Free    Lipid Panel With / Chol / HDL Ratio    TSH    T4, Free    T3, Free    Urinalysis With / Microscopic If Indicated - Urine, Clean Catch       Other    Boil    Relevant Orders    CBC & Differential    Comprehensive Metabolic Panel    PSA, Total & Free    Lipid Panel With / Chol / HDL Ratio    TSH    T4, Free    T3, Free    Urinalysis With / Microscopic If Indicated - Urine, Clean Catch      Other Visit Diagnoses     Medicare annual wellness visit, subsequent    -  Primary    Relevant Orders    CBC & Differential    Comprehensive Metabolic Panel    PSA, Total & Free    Lipid Panel With / Chol / HDL Ratio    TSH    T4, Free    T3, Free    Urinalysis With / Microscopic If Indicated - Urine, Clean Catch    Open wound of left heel, subsequent encounter        Relevant Medications    mupirocin (BACTROBAN) 2 % ointment    Other Relevant Orders    CBC & Differential    Comprehensive Metabolic Panel    PSA, Total & Free    Lipid Panel With / Chol / HDL Ratio    TSH    T4, Free    T3, Free    Urinalysis With / Microscopic If Indicated - Urine, Clean Catch    Benign prostatic hyperplasia, unspecified whether lower urinary tract symptoms present        Relevant Orders    PSA, Total & Free      plan: Meds remain the same screening labs recheck in about 3 months. Mupirocin applied 3 times a day to the left heel  finish clindamycin.

## 2018-03-29 LAB
ALBUMIN SERPL-MCNC: 4.1 G/DL (ref 3.5–5.2)
ALBUMIN/GLOB SERPL: 1.1 G/DL
ALP SERPL-CCNC: 55 U/L (ref 39–117)
ALT SERPL-CCNC: 13 U/L (ref 1–41)
APPEARANCE UR: CLEAR
AST SERPL-CCNC: 21 U/L (ref 1–40)
BASOPHILS # BLD AUTO: 0.05 10*3/MM3 (ref 0–0.2)
BASOPHILS NFR BLD AUTO: 0.5 % (ref 0–1.5)
BILIRUB SERPL-MCNC: 0.2 MG/DL (ref 0.1–1.2)
BILIRUB UR QL STRIP: NEGATIVE
BUN SERPL-MCNC: 20 MG/DL (ref 8–23)
BUN/CREAT SERPL: 19 (ref 7–25)
CALCIUM SERPL-MCNC: 10 MG/DL (ref 8.6–10.5)
CHLORIDE SERPL-SCNC: 99 MMOL/L (ref 98–107)
CHOLEST SERPL-MCNC: 162 MG/DL (ref 0–200)
CHOLEST/HDLC SERPL: 2.66 {RATIO}
CO2 SERPL-SCNC: 31 MMOL/L (ref 22–29)
COLOR UR: YELLOW
CREAT SERPL-MCNC: 1.05 MG/DL (ref 0.76–1.27)
EOSINOPHIL # BLD AUTO: 0.09 10*3/MM3 (ref 0–0.7)
EOSINOPHIL NFR BLD AUTO: 0.9 % (ref 0.3–6.2)
ERYTHROCYTE [DISTWIDTH] IN BLOOD BY AUTOMATED COUNT: 14.3 % (ref 11.5–14.5)
GFR SERPLBLD CREATININE-BSD FMLA CKD-EPI: 69 ML/MIN/1.73
GFR SERPLBLD CREATININE-BSD FMLA CKD-EPI: 84 ML/MIN/1.73
GLOBULIN SER CALC-MCNC: 3.6 GM/DL
GLUCOSE SERPL-MCNC: 98 MG/DL (ref 65–99)
GLUCOSE UR QL: NEGATIVE
HCT VFR BLD AUTO: 42.1 % (ref 40.4–52.2)
HDLC SERPL-MCNC: 61 MG/DL (ref 40–60)
HGB BLD-MCNC: 13.2 G/DL (ref 13.7–17.6)
HGB UR QL STRIP: NEGATIVE
IMM GRANULOCYTES # BLD: 0.02 10*3/MM3 (ref 0–0.03)
IMM GRANULOCYTES NFR BLD: 0.2 % (ref 0–0.5)
KETONES UR QL STRIP: NEGATIVE
LDLC SERPL CALC-MCNC: 91 MG/DL (ref 0–100)
LEUKOCYTE ESTERASE UR QL STRIP: NEGATIVE
LYMPHOCYTES # BLD AUTO: 1.65 10*3/MM3 (ref 0.9–4.8)
LYMPHOCYTES NFR BLD AUTO: 16.7 % (ref 19.6–45.3)
MCH RBC QN AUTO: 30.3 PG (ref 27–32.7)
MCHC RBC AUTO-ENTMCNC: 31.4 G/DL (ref 32.6–36.4)
MCV RBC AUTO: 96.8 FL (ref 79.8–96.2)
MONOCYTES # BLD AUTO: 0.74 10*3/MM3 (ref 0.2–1.2)
MONOCYTES NFR BLD AUTO: 7.5 % (ref 5–12)
NEUTROPHILS # BLD AUTO: 7.34 10*3/MM3 (ref 1.9–8.1)
NEUTROPHILS NFR BLD AUTO: 74.2 % (ref 42.7–76)
NITRITE UR QL STRIP: NEGATIVE
PH UR STRIP: 7 [PH] (ref 5–8)
PLATELET # BLD AUTO: 314 10*3/MM3 (ref 140–500)
POTASSIUM SERPL-SCNC: 4.3 MMOL/L (ref 3.5–5.2)
PROT SERPL-MCNC: 7.7 G/DL (ref 6–8.5)
PROT UR QL STRIP: NEGATIVE
PSA FREE MFR SERPL: 11.3 %
PSA FREE SERPL-MCNC: 0.09 NG/ML
PSA SERPL-MCNC: 0.8 NG/ML (ref 0–4)
RBC # BLD AUTO: 4.35 10*6/MM3 (ref 4.6–6)
SODIUM SERPL-SCNC: 142 MMOL/L (ref 136–145)
SP GR UR: 1.02 (ref 1–1.03)
T3FREE SERPL-MCNC: 2.8 PG/ML (ref 2–4.4)
T4 FREE SERPL-MCNC: 1.1 NG/DL (ref 0.93–1.7)
TRIGL SERPL-MCNC: 49 MG/DL (ref 0–150)
TSH SERPL DL<=0.005 MIU/L-ACNC: 0.92 MIU/ML (ref 0.27–4.2)
UROBILINOGEN UR STRIP-MCNC: NORMAL MG/DL
VLDLC SERPL CALC-MCNC: 9.8 MG/DL (ref 5–40)
WBC # BLD AUTO: 9.89 10*3/MM3 (ref 4.5–10.7)

## 2018-04-06 ENCOUNTER — TELEPHONE (OUTPATIENT)
Dept: INTERNAL MEDICINE | Facility: CLINIC | Age: 73
End: 2018-04-06

## 2018-04-06 NOTE — TELEPHONE ENCOUNTER
Pt called stating he still has boil on his hand and was treated but it still has not gone away. Please advise

## 2018-04-07 RX ORDER — CEPHALEXIN 500 MG/1
500 CAPSULE ORAL 3 TIMES DAILY
Qty: 30 CAPSULE | Refills: 0 | Status: SHIPPED | OUTPATIENT
Start: 2018-04-07

## 2018-04-16 RX ORDER — ATENOLOL AND CHLORTHALIDONE TABLET 50; 25 MG/1; MG/1
1 TABLET ORAL DAILY
Qty: 90 TABLET | Refills: 1 | Status: SHIPPED | OUTPATIENT
Start: 2018-04-16

## 2018-04-30 ENCOUNTER — TELEPHONE (OUTPATIENT)
Dept: INTERNAL MEDICINE | Facility: CLINIC | Age: 73
End: 2018-04-30

## 2018-11-02 RX ORDER — ATENOLOL AND CHLORTHALIDONE TABLET 50; 25 MG/1; MG/1
1 TABLET ORAL DAILY
Qty: 90 TABLET | Refills: 1 | OUTPATIENT
Start: 2018-11-02

## 2023-12-20 ENCOUNTER — APPOINTMENT (OUTPATIENT)
Dept: CT IMAGING | Facility: HOSPITAL | Age: 78
End: 2023-12-20
Payer: MEDICARE

## 2023-12-20 ENCOUNTER — HOSPITAL ENCOUNTER (INPATIENT)
Facility: HOSPITAL | Age: 78
LOS: 17 days | Discharge: NURSING, REHAB OR LONG-TERM CARE | End: 2024-01-08
Attending: EMERGENCY MEDICINE | Admitting: INTERNAL MEDICINE
Payer: MEDICARE

## 2023-12-20 ENCOUNTER — APPOINTMENT (OUTPATIENT)
Dept: GENERAL RADIOLOGY | Facility: HOSPITAL | Age: 78
End: 2023-12-20
Payer: MEDICARE

## 2023-12-20 DIAGNOSIS — R74.8 ELEVATED CK: ICD-10-CM

## 2023-12-20 DIAGNOSIS — M25.50 ARTHRALGIA, UNSPECIFIED JOINT: ICD-10-CM

## 2023-12-20 DIAGNOSIS — M79.10 MUSCLE PAIN: Primary | ICD-10-CM

## 2023-12-20 DIAGNOSIS — Z74.09 IMMOBILITY: ICD-10-CM

## 2023-12-20 DIAGNOSIS — R42 DIZZINESS: ICD-10-CM
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
## 2023-12-20 DIAGNOSIS — M45.2 ANKYLOSING SPONDYLITIS OF CERVICAL REGION: Chronic | ICD-10-CM

## 2023-12-20 PROBLEM — R52 GENERALIZED PAIN: Status: ACTIVE | Noted: 2023-12-20

## 2023-12-20 PROBLEM — F12.10 TETRAHYDROCANNABINOL (THC) USE DISORDER, MILD, ABUSE: Status: ACTIVE | Noted: 2023-12-20
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
## 2023-12-20 PROBLEM — R33.9 URINE RETENTION: Status: ACTIVE | Noted: 2023-12-20

## 2023-12-20 PROBLEM — K59.00 CONSTIPATION: Status: ACTIVE | Noted: 2023-12-20

## 2023-12-20 PROBLEM — F43.21 GRIEF: Status: ACTIVE | Noted: 2023-12-20

## 2023-12-20 PROBLEM — Z63.4 WIDOWER: Status: ACTIVE | Noted: 2023-12-20

## 2023-12-20 PROBLEM — M48.10 DISH (DISSEMINATED IDIOPATHIC SKELETAL HYPEROSTOSIS): Chronic | Status: ACTIVE | Noted: 2023-12-20

## 2023-12-20 LAB
ALBUMIN SERPL-MCNC: 3.8 G/DL (ref 3.5–5.2)
ALBUMIN/GLOB SERPL: 1.1 G/DL
ALP SERPL-CCNC: 47 U/L (ref 39–117)
ALT SERPL W P-5'-P-CCNC: 12 U/L (ref 1–41)
AMPHET+METHAMPHET UR QL: NEGATIVE
ANION GAP SERPL CALCULATED.3IONS-SCNC: 12 MMOL/L (ref 5–15)
AST SERPL-CCNC: 35 U/L (ref 1–40)
BARBITURATES UR QL SCN: NEGATIVE
BASOPHILS # BLD AUTO: 0.03 10*3/MM3 (ref 0–0.2)
BASOPHILS NFR BLD AUTO: 0.7 % (ref 0–1.5)
BENZODIAZ UR QL SCN: NEGATIVE
BILIRUB SERPL-MCNC: 0.4 MG/DL (ref 0–1.2)
BILIRUB UR QL STRIP: NEGATIVE
BUN SERPL-MCNC: 18 MG/DL (ref 8–23)
BUN/CREAT SERPL: 18 (ref 7–25)
CALCIUM SPEC-SCNC: 9.1 MG/DL (ref 8.6–10.5)
CANNABINOIDS SERPL QL: POSITIVE
CHLORIDE SERPL-SCNC: 98 MMOL/L (ref 98–107)
CK SERPL-CCNC: 1062 U/L (ref 20–200)
CK SERPL-CCNC: 652 U/L (ref 20–200)
CLARITY UR: ABNORMAL
CO2 SERPL-SCNC: 28 MMOL/L (ref 22–29)
COCAINE UR QL: NEGATIVE
COLOR UR: YELLOW
CREAT SERPL-MCNC: 1 MG/DL (ref 0.76–1.27)
CRP SERPL-MCNC: 0.93 MG/DL (ref 0–0.5)
D-LACTATE SERPL-SCNC: 1.8 MMOL/L (ref 0.5–2)
D-LACTATE SERPL-SCNC: 2.4 MMOL/L (ref 0.5–2)
D-LACTATE SERPL-SCNC: 2.5 MMOL/L (ref 0.5–2)
DEPRECATED RDW RBC AUTO: 51.1 FL (ref 37–54)
EGFRCR SERPLBLD CKD-EPI 2021: 76.6 ML/MIN/1.73
EOSINOPHIL # BLD AUTO: 0.01 10*3/MM3 (ref 0–0.4)
EOSINOPHIL NFR BLD AUTO: 0.2 % (ref 0.3–6.2)
ERYTHROCYTE [DISTWIDTH] IN BLOOD BY AUTOMATED COUNT: 14.7 % (ref 12.3–15.4)
ERYTHROCYTE [SEDIMENTATION RATE] IN BLOOD: 57 MM/HR (ref 0–20)
ETHANOL BLD-MCNC: <10 MG/DL (ref 0–10)
ETHANOL UR QL: <0.01 %
FENTANYL UR-MCNC: NEGATIVE NG/ML
GLOBULIN UR ELPH-MCNC: 3.6 GM/DL
GLUCOSE SERPL-MCNC: 99 MG/DL (ref 65–99)
GLUCOSE UR STRIP-MCNC: NEGATIVE MG/DL
HCT VFR BLD AUTO: 39.3 % (ref 37.5–51)
HGB BLD-MCNC: 13 G/DL (ref 13–17.7)
HGB UR QL STRIP.AUTO: NEGATIVE
IMM GRANULOCYTES # BLD AUTO: 0.01 10*3/MM3 (ref 0–0.05)
IMM GRANULOCYTES NFR BLD AUTO: 0.2 % (ref 0–0.5)
INR PPP: 1.2 (ref 0.9–1.1)
KETONES UR QL STRIP: NEGATIVE
LEUKOCYTE ESTERASE UR QL STRIP.AUTO: NEGATIVE
LIPASE SERPL-CCNC: 60 U/L (ref 13–60)
LYMPHOCYTES # BLD AUTO: 0.83 10*3/MM3 (ref 0.7–3.1)
LYMPHOCYTES NFR BLD AUTO: 19.5 % (ref 19.6–45.3)
MAGNESIUM SERPL-MCNC: 1.7 MG/DL (ref 1.6–2.4)
MCH RBC QN AUTO: 31.8 PG (ref 26.6–33)
MCHC RBC AUTO-ENTMCNC: 33.1 G/DL (ref 31.5–35.7)
MCV RBC AUTO: 96.1 FL (ref 79–97)
METHADONE UR QL SCN: NEGATIVE
MONOCYTES # BLD AUTO: 0.8 10*3/MM3 (ref 0.1–0.9)
MONOCYTES NFR BLD AUTO: 18.8 % (ref 5–12)
NEUTROPHILS NFR BLD AUTO: 2.57 10*3/MM3 (ref 1.7–7)
NEUTROPHILS NFR BLD AUTO: 60.6 % (ref 42.7–76)
NITRITE UR QL STRIP: NEGATIVE
NRBC BLD AUTO-RTO: 0 /100 WBC (ref 0–0.2)
NT-PROBNP SERPL-MCNC: 263 PG/ML (ref 0–1800)
OPIATES UR QL: POSITIVE
OXYCODONE UR QL SCN: NEGATIVE
PH UR STRIP.AUTO: 7.5 [PH] (ref 5–8)
PLATELET # BLD AUTO: 171 10*3/MM3 (ref 140–450)
PMV BLD AUTO: 9.3 FL (ref 6–12)
POTASSIUM SERPL-SCNC: 3.8 MMOL/L (ref 3.5–5.2)
PROT SERPL-MCNC: 7.4 G/DL (ref 6–8.5)
PROT UR QL STRIP: ABNORMAL
PROTHROMBIN TIME: 15.3 SECONDS (ref 11.7–14.2)
RBC # BLD AUTO: 4.09 10*6/MM3 (ref 4.14–5.8)
SODIUM SERPL-SCNC: 138 MMOL/L (ref 136–145)
SP GR UR STRIP: 1.02 (ref 1–1.03)
TSH SERPL DL<=0.05 MIU/L-ACNC: 2.84 UIU/ML (ref 0.27–4.2)
UROBILINOGEN UR QL STRIP: ABNORMAL
WBC NRBC COR # BLD AUTO: 4.25 10*3/MM3 (ref 3.4–10.8)

## 2023-12-20 PROCEDURE — 83690 ASSAY OF LIPASE: CPT | Performed by: EMERGENCY MEDICINE

## 2023-12-20 PROCEDURE — 25810000003 SODIUM CHLORIDE 0.9 % SOLUTION: Performed by: INTERNAL MEDICINE

## 2023-12-20 PROCEDURE — 25010000002 MAGNESIUM SULFATE 2 GM/50ML SOLUTION: Performed by: INTERNAL MEDICINE

## 2023-12-20 PROCEDURE — 73630 X-RAY EXAM OF FOOT: CPT

## 2023-12-20 PROCEDURE — 85025 COMPLETE CBC W/AUTO DIFF WBC: CPT | Performed by: EMERGENCY MEDICINE

## 2023-12-20 PROCEDURE — 84443 ASSAY THYROID STIM HORMONE: CPT | Performed by: EMERGENCY MEDICINE

## 2023-12-20 PROCEDURE — 80307 DRUG TEST PRSMV CHEM ANLYZR: CPT | Performed by: EMERGENCY MEDICINE

## 2023-12-20 PROCEDURE — 25010000002 ONDANSETRON PER 1 MG: Performed by: EMERGENCY MEDICINE

## 2023-12-20 PROCEDURE — 83605 ASSAY OF LACTIC ACID: CPT | Performed by: INTERNAL MEDICINE

## 2023-12-20 PROCEDURE — 25010000002 SODIUM CHLORIDE 0.9 % WITH KCL 20 MEQ 20-0.9 MEQ/L-% SOLUTION: Performed by: INTERNAL MEDICINE

## 2023-12-20 PROCEDURE — 70450 CT HEAD/BRAIN W/O DYE: CPT

## 2023-12-20 PROCEDURE — 82550 ASSAY OF CK (CPK): CPT | Performed by: INTERNAL MEDICINE

## 2023-12-20 PROCEDURE — 25810000003 SODIUM CHLORIDE 0.9 % SOLUTION: Performed by: EMERGENCY MEDICINE

## 2023-12-20 PROCEDURE — 86140 C-REACTIVE PROTEIN: CPT | Performed by: EMERGENCY MEDICINE

## 2023-12-20 PROCEDURE — 71045 X-RAY EXAM CHEST 1 VIEW: CPT

## 2023-12-20 PROCEDURE — 85610 PROTHROMBIN TIME: CPT | Performed by: EMERGENCY MEDICINE

## 2023-12-20 PROCEDURE — 25010000002 MORPHINE PER 10 MG: Performed by: EMERGENCY MEDICINE

## 2023-12-20 PROCEDURE — 81003 URINALYSIS AUTO W/O SCOPE: CPT | Performed by: EMERGENCY MEDICINE

## 2023-12-20 PROCEDURE — 74176 CT ABD & PELVIS W/O CONTRAST: CPT

## 2023-12-20 PROCEDURE — G0378 HOSPITAL OBSERVATION PER HR: HCPCS

## 2023-12-20 PROCEDURE — 82550 ASSAY OF CK (CPK): CPT | Performed by: EMERGENCY MEDICINE

## 2023-12-20 PROCEDURE — 85652 RBC SED RATE AUTOMATED: CPT | Performed by: EMERGENCY MEDICINE

## 2023-12-20 PROCEDURE — 83735 ASSAY OF MAGNESIUM: CPT | Performed by: EMERGENCY MEDICINE

## 2023-12-20 PROCEDURE — 82077 ASSAY SPEC XCP UR&BREATH IA: CPT | Performed by: EMERGENCY MEDICINE

## 2023-12-20 PROCEDURE — 80053 COMPREHEN METABOLIC PANEL: CPT | Performed by: EMERGENCY MEDICINE

## 2023-12-20 PROCEDURE — 36415 COLL VENOUS BLD VENIPUNCTURE: CPT

## 2023-12-20 PROCEDURE — 83880 ASSAY OF NATRIURETIC PEPTIDE: CPT | Performed by: EMERGENCY MEDICINE

## 2023-12-20 PROCEDURE — 99285 EMERGENCY DEPT VISIT HI MDM: CPT

## 2023-12-20 RX ORDER — CHLORTHALIDONE 25 MG/1
25 TABLET ORAL
Status: DISCONTINUED | OUTPATIENT
Start: 2023-12-21 | End: 2023-12-21

## 2023-12-20 RX ORDER — TAMSULOSIN HYDROCHLORIDE 0.4 MG/1
0.4 CAPSULE ORAL NIGHTLY
Status: DISCONTINUED | OUTPATIENT
Start: 2023-12-20 | End: 2024-01-08 | Stop reason: HOSPADM

## 2023-12-20 RX ORDER — POLYETHYLENE GLYCOL 3350 17 G/17G
17 POWDER, FOR SOLUTION ORAL DAILY PRN
Status: DISCONTINUED | OUTPATIENT
Start: 2023-12-20 | End: 2024-01-08 | Stop reason: HOSPADM

## 2023-12-20 RX ORDER — CALCIUM CARBONATE 500 MG/1
2 TABLET, CHEWABLE ORAL 2 TIMES DAILY PRN
Status: DISCONTINUED | OUTPATIENT
Start: 2023-12-20 | End: 2024-01-08 | Stop reason: HOSPADM

## 2023-12-20 RX ORDER — ATENOLOL 50 MG/1
50 TABLET ORAL
Status: DISCONTINUED | OUTPATIENT
Start: 2023-12-21 | End: 2023-12-29

## 2023-12-20 RX ORDER — BISACODYL 5 MG/1
5 TABLET, DELAYED RELEASE ORAL DAILY PRN
Status: DISCONTINUED | OUTPATIENT
Start: 2023-12-20 | End: 2024-01-08 | Stop reason: HOSPADM

## 2023-12-20 RX ORDER — UREA 10 %
1 LOTION (ML) TOPICAL NIGHTLY PRN
Status: DISCONTINUED | OUTPATIENT
Start: 2023-12-20 | End: 2024-01-08 | Stop reason: HOSPADM

## 2023-12-20 RX ORDER — SODIUM CHLORIDE 9 MG/ML
40 INJECTION, SOLUTION INTRAVENOUS AS NEEDED
Status: DISCONTINUED | OUTPATIENT
Start: 2023-12-20 | End: 2024-01-08 | Stop reason: HOSPADM

## 2023-12-20 RX ORDER — SODIUM CHLORIDE 9 MG/ML
100 INJECTION, SOLUTION INTRAVENOUS CONTINUOUS
Status: DISCONTINUED | OUTPATIENT
Start: 2023-12-20 | End: 2023-12-20

## 2023-12-20 RX ORDER — ACETAMINOPHEN 650 MG/1
650 SUPPOSITORY RECTAL EVERY 4 HOURS PRN
Status: DISCONTINUED | OUTPATIENT
Start: 2023-12-20 | End: 2024-01-08 | Stop reason: HOSPADM

## 2023-12-20 RX ORDER — ACETAMINOPHEN 160 MG/5ML
650 SOLUTION ORAL EVERY 4 HOURS PRN
Status: DISCONTINUED | OUTPATIENT
Start: 2023-12-20 | End: 2024-01-08 | Stop reason: HOSPADM

## 2023-12-20 RX ORDER — AMOXICILLIN 250 MG
2 CAPSULE ORAL 2 TIMES DAILY
Status: DISCONTINUED | OUTPATIENT
Start: 2023-12-20 | End: 2024-01-08 | Stop reason: HOSPADM

## 2023-12-20 RX ORDER — POTASSIUM CHLORIDE 750 MG/1
20 TABLET, FILM COATED, EXTENDED RELEASE ORAL ONCE
Status: COMPLETED | OUTPATIENT
Start: 2023-12-20 | End: 2023-12-20

## 2023-12-20 RX ORDER — MORPHINE SULFATE 2 MG/ML
2 INJECTION, SOLUTION INTRAMUSCULAR; INTRAVENOUS ONCE
Status: COMPLETED | OUTPATIENT
Start: 2023-12-20 | End: 2023-12-20

## 2023-12-20 RX ORDER — ONDANSETRON 4 MG/1
4 TABLET, ORALLY DISINTEGRATING ORAL EVERY 6 HOURS PRN
Status: DISCONTINUED | OUTPATIENT
Start: 2023-12-20 | End: 2024-01-08 | Stop reason: HOSPADM

## 2023-12-20 RX ORDER — SODIUM CHLORIDE 0.9 % (FLUSH) 0.9 %
10 SYRINGE (ML) INJECTION AS NEEDED
Status: DISCONTINUED | OUTPATIENT
Start: 2023-12-20 | End: 2024-01-08 | Stop reason: HOSPADM

## 2023-12-20 RX ORDER — SODIUM CHLORIDE AND POTASSIUM CHLORIDE 150; 900 MG/100ML; MG/100ML
125 INJECTION, SOLUTION INTRAVENOUS CONTINUOUS
Status: DISPENSED | OUTPATIENT
Start: 2023-12-20 | End: 2023-12-21

## 2023-12-20 RX ORDER — MAGNESIUM SULFATE HEPTAHYDRATE 40 MG/ML
2 INJECTION, SOLUTION INTRAVENOUS ONCE
Status: COMPLETED | OUTPATIENT
Start: 2023-12-20 | End: 2023-12-20

## 2023-12-20 RX ORDER — SODIUM CHLORIDE 0.9 % (FLUSH) 0.9 %
10 SYRINGE (ML) INJECTION EVERY 12 HOURS SCHEDULED
Status: DISCONTINUED | OUTPATIENT
Start: 2023-12-20 | End: 2024-01-06

## 2023-12-20 RX ORDER — PREDNISONE 20 MG/1
40 TABLET ORAL
Status: DISCONTINUED | OUTPATIENT
Start: 2023-12-20 | End: 2023-12-26

## 2023-12-20 RX ORDER — MUSCLE RUB CREAM 100; 150 MG/G; MG/G
CREAM TOPICAL
Status: DISCONTINUED | OUTPATIENT
Start: 2023-12-20 | End: 2024-01-08 | Stop reason: HOSPADM

## 2023-12-20 RX ORDER — ONDANSETRON 2 MG/ML
4 INJECTION INTRAMUSCULAR; INTRAVENOUS ONCE
Status: COMPLETED | OUTPATIENT
Start: 2023-12-20 | End: 2023-12-20

## 2023-12-20 RX ORDER — ACETAMINOPHEN 500 MG
1000 TABLET ORAL 3 TIMES DAILY
Status: DISCONTINUED | OUTPATIENT
Start: 2023-12-20 | End: 2024-01-08 | Stop reason: HOSPADM

## 2023-12-20 RX ORDER — ONDANSETRON 2 MG/ML
4 INJECTION INTRAMUSCULAR; INTRAVENOUS EVERY 6 HOURS PRN
Status: DISCONTINUED | OUTPATIENT
Start: 2023-12-20 | End: 2024-01-08 | Stop reason: HOSPADM

## 2023-12-20 RX ORDER — METHOTREXATE 2.5 MG/1
2.5 TABLET ORAL
Status: DISCONTINUED | OUTPATIENT
Start: 2023-12-21 | End: 2024-01-08 | Stop reason: HOSPADM

## 2023-12-20 RX ORDER — BISACODYL 10 MG
10 SUPPOSITORY, RECTAL RECTAL DAILY PRN
Status: DISCONTINUED | OUTPATIENT
Start: 2023-12-20 | End: 2024-01-08 | Stop reason: HOSPADM

## 2023-12-20 RX ORDER — TRAMADOL HYDROCHLORIDE 50 MG/1
25 TABLET ORAL EVERY 6 HOURS PRN
Status: DISPENSED | OUTPATIENT
Start: 2023-12-20 | End: 2023-12-27

## 2023-12-20 RX ORDER — LIDOCAINE 4 G/G
2 PATCH TOPICAL
Status: DISCONTINUED | OUTPATIENT
Start: 2023-12-21 | End: 2024-01-08 | Stop reason: HOSPADM

## 2023-12-20 RX ORDER — ACETAMINOPHEN 325 MG/1
650 TABLET ORAL EVERY 4 HOURS PRN
Status: DISCONTINUED | OUTPATIENT
Start: 2023-12-20 | End: 2024-01-08 | Stop reason: HOSPADM

## 2023-12-20 RX ADMIN — MAGNESIUM SULFATE HEPTAHYDRATE 2 G: 40 INJECTION, SOLUTION INTRAVENOUS at 18:30

## 2023-12-20 RX ADMIN — TAMSULOSIN HYDROCHLORIDE 0.4 MG: 0.4 CAPSULE ORAL at 21:37

## 2023-12-20 RX ADMIN — POTASSIUM CHLORIDE AND SODIUM CHLORIDE 125 ML/HR: 900; 150 INJECTION, SOLUTION INTRAVENOUS at 19:41

## 2023-12-20 RX ADMIN — SENNOSIDES AND DOCUSATE SODIUM 2 TABLET: 50; 8.6 TABLET ORAL at 21:37

## 2023-12-20 RX ADMIN — ACETAMINOPHEN 1000 MG: 500 TABLET ORAL at 21:37

## 2023-12-20 RX ADMIN — TRAMADOL HYDROCHLORIDE 25 MG: 50 TABLET ORAL at 18:30

## 2023-12-20 RX ADMIN — ONDANSETRON HYDROCHLORIDE 4 MG: 2 INJECTION, SOLUTION INTRAMUSCULAR; INTRAVENOUS at 10:38

## 2023-12-20 RX ADMIN — MORPHINE SULFATE 2 MG: 2 INJECTION, SOLUTION INTRAMUSCULAR; INTRAVENOUS at 10:38

## 2023-12-20 RX ADMIN — SODIUM CHLORIDE 100 ML/HR: 9 INJECTION, SOLUTION INTRAVENOUS at 10:36

## 2023-12-20 RX ADMIN — SODIUM CHLORIDE 1000 ML: 9 INJECTION, SOLUTION INTRAVENOUS at 19:58

## 2023-12-20 RX ADMIN — Medication 10 ML: at 21:38

## 2023-12-20 RX ADMIN — POTASSIUM CHLORIDE 20 MEQ: 750 TABLET, EXTENDED RELEASE ORAL at 18:30

## 2023-12-20 NOTE — H&P
PCP: Marco Macedo MD    Chief complaint   Chief Complaint   Patient presents with    Leg Pain       HPI  Patient is a 78 y.o. male presents with with a history of ankylosing spondylitis diagnosed 35 years ago, hypertension, arthritis, chronic pain who presents to the ER due to fall and inability to ambulate.  Patient recently moved back from [place] about a week ago.  His wife  in May 2023.  He had seen a rheumatologist at their Dr. Hammonds and had been on Remicade and Enbrel and  steroids and is currently on methotrexate.  Patient is living in an apartment by himself.  He supposedly dropped his cell phone and fell.  Patient states that he did not hit his head.  That it may have been on the pillows.  But he could not get up he called his daughter and they got the son and the son got him up but he could not stand on his own.  He normally uses a cane.  He is somewhat of a poor historian and just says it hurts all over.  He is got chronic pain.  He occasionally uses THC.  He has a history of a left total hip in .  He is seeing Dr. Fields with rheumatology in the past when he lived here.      PAST MEDICAL HISTORY  Past Medical History:   Diagnosis Date    Abscess of scrotum     Arthritis     AS (ankylosing spondylitis)     Hypertension     Tetrahydrocannabinol (THC) use disorder, mild, abuse 2023    Uveitis        PAST SURGICAL HISTORY  Past Surgical History:   Procedure Laterality Date    COLONOSCOPY      ROTATOR CUFF REPAIR Right     TOTAL HIP ARTHROPLASTY Left        FAMILY HISTORY  Family History   Problem Relation Age of Onset    Alzheimer's disease Mother        SOCIAL HISTORY  Social History     Tobacco Use    Smoking status: Never    Smokeless tobacco: Never   Substance Use Topics    Alcohol use: No    Drug use: No       MEDICATIONS:  Medications Prior to Admission   Medication Sig Dispense Refill Last Dose    atenolol-chlorthalidone (TENORETIC) 50-25 MG per tablet TAKE 1 TABLET BY MOUTH  "DAILY. 90 tablet 1 12/20/2023    esomeprazole (nexIUM) 20 MG capsule Take 1 capsule by mouth Daily As Needed (asneeded).   Past Week    methotrexate 2.5 MG tablet Take 1 tablet by mouth 2 (Two) Times a Week. Pt takes every Wednesday and thursday  5 Past Month    Omega-3 Fatty Acids (OMEGA 3 PO) Take 1 capsule by mouth Daily.   12/19/2023    Naproxen Sodium 220 MG capsule Take 220 mg of amoxicillin by mouth Daily.          Allergies:  Patient has no known allergies.    Review of Systems:  Severe pain all over but back worse fatigue , poor mobility, polyarthritis  Negative for following (except as per HPI):  Constitution: chills, fevers,   Eyes: change of vision, loss of vision and discharge  ENT: ear drainage, ear ringing and facial trauma  Respiratory: cough, pleuritic pain, shortness of air  Cardiovascular: chest pressure, pain, lower extremity edema, palpitations  Gastrointestinal: constipation, diarrhea, nausea, vomiting, pain    Integument: rash and wound  Hematologic / Lymphatic: excessive bleeding and easy bruising  Musculoskeletal:  joint swelling and muscle pain  Neurological: headaches, numbness, seizures and tremors  Behavioral / Psych: anxiety, depression and hallucinations         Vital Signs  Temp:  [98 °F (36.7 °C)-98.8 °F (37.1 °C)] 98.8 °F (37.1 °C)  Heart Rate:  [53-73] 70  Resp:  [18] 18  BP: (120-151)/(65-76) 120/73  Flowsheet Rows      Flowsheet Row First Filed Value   Admission Height 190.5 cm (75\") Documented at 12/20/2023 1005   Admission Weight 81.9 kg (180 lb 8 oz) Documented at 12/20/2023 1005           Body mass index is 22.56 kg/m².   o2 sat    Physical Exam:  General Appearance:    Alert, cooperative, in acute distress 2/2 pain, grimacew   Head:    Normocephalic, without obvious abnormality, atraumatic   Eyes:         conjunctivae and sclerae normal, no icterus, PERRLA   ENT:    Ears grossly intact, oral mucosa moist,       Back:     Normal to inspection, range of motion normal "   Lungs:     Clear to auscultation,respirations regular, even and                   unlabored    Heart:    Regular rhythm and normal rate,  no murmur, normal S1 and S2,   Abdomen:     Normal bowel sounds, no masses,  soft non-tender, non-distended,    Extremities:   Moves all extremities well, no cyanosis, no edema,             Pulses:   Pulses palpable and equal bilaterally   Skin:   No bleeding, rash, bruising    Neurologic:    Psych:   Cranial nerves 2 - 12 grossly intact, sensation grossly intact,     Moves all extremities well, equal bilateral strength    Alert and Oriented x 3, Normal Affect    I washed/sanitized my hands before entering the room and immediately upon leaving the room.    LABS:  Admission on 12/20/2023   Component Date Value Ref Range Status    Glucose 12/20/2023 99  65 - 99 mg/dL Final    BUN 12/20/2023 18  8 - 23 mg/dL Final    Creatinine 12/20/2023 1.00  0.76 - 1.27 mg/dL Final    Sodium 12/20/2023 138  136 - 145 mmol/L Final    Potassium 12/20/2023 3.8  3.5 - 5.2 mmol/L Final    Slight hemolysis detected by analyzer. Result may be falsely elevated.    Chloride 12/20/2023 98  98 - 107 mmol/L Final    CO2 12/20/2023 28.0  22.0 - 29.0 mmol/L Final    Calcium 12/20/2023 9.1  8.6 - 10.5 mg/dL Final    Total Protein 12/20/2023 7.4  6.0 - 8.5 g/dL Final    Albumin 12/20/2023 3.8  3.5 - 5.2 g/dL Final    ALT (SGPT) 12/20/2023 12  1 - 41 U/L Final    AST (SGOT) 12/20/2023 35  1 - 40 U/L Final    Slight hemolysis detected by analyzer. Result may be falsely elevated.    Alkaline Phosphatase 12/20/2023 47  39 - 117 U/L Final    Total Bilirubin 12/20/2023 0.4  0.0 - 1.2 mg/dL Final    Globulin 12/20/2023 3.6  gm/dL Final    A/G Ratio 12/20/2023 1.1  g/dL Final    BUN/Creatinine Ratio 12/20/2023 18.0  7.0 - 25.0 Final    Anion Gap 12/20/2023 12.0  5.0 - 15.0 mmol/L Final    eGFR 12/20/2023 76.6  >60.0 mL/min/1.73 Final    Protime 12/20/2023 15.3 (H)  11.7 - 14.2 Seconds Final    INR 12/20/2023 1.20 (H)   0.90 - 1.10 Final    Lipase 12/20/2023 60  13 - 60 U/L Final    Color, UA 12/20/2023 Yellow  Yellow, Straw Final    Appearance, UA 12/20/2023 Cloudy (A)  Clear Final    pH, UA 12/20/2023 7.5  5.0 - 8.0 Final    Specific Gravity, UA 12/20/2023 1.019  1.005 - 1.030 Final    Glucose, UA 12/20/2023 Negative  Negative Final    Ketones, UA 12/20/2023 Negative  Negative Final    Bilirubin, UA 12/20/2023 Negative  Negative Final    Blood, UA 12/20/2023 Negative  Negative Final    Protein, UA 12/20/2023 Trace (A)  Negative Final    Leuk Esterase, UA 12/20/2023 Negative  Negative Final    Nitrite, UA 12/20/2023 Negative  Negative Final    Urobilinogen, UA 12/20/2023 1.0 E.U./dL  0.2 - 1.0 E.U./dL Final    proBNP 12/20/2023 263.0  0.0 - 1,800.0 pg/mL Final    C-Reactive Protein 12/20/2023 0.93 (H)  0.00 - 0.50 mg/dL Final    Sed Rate 12/20/2023 57 (H)  0 - 20 mm/hr Final    Ethanol 12/20/2023 <10  0 - 10 mg/dL Final    Ethanol % 12/20/2023 <0.010  % Final    Amphet/Methamphet, Screen 12/20/2023 Negative  Negative Final    Barbiturates Screen, Urine 12/20/2023 Negative  Negative Final    Benzodiazepine Screen, Urine 12/20/2023 Negative  Negative Final    Cocaine Screen, Urine 12/20/2023 Negative  Negative Final    Opiate Screen 12/20/2023 Positive (A)  Negative Final    THC, Screen, Urine 12/20/2023 Positive (A)  Negative Final    Methadone Screen, Urine 12/20/2023 Negative  Negative Final    Oxycodone Screen, Urine 12/20/2023 Negative  Negative Final    Fentanyl, Urine 12/20/2023 Negative  Negative Final    Creatine Kinase 12/20/2023 652 (H)  20 - 200 U/L Final    Magnesium 12/20/2023 1.7  1.6 - 2.4 mg/dL Final    TSH 12/20/2023 2.840  0.270 - 4.200 uIU/mL Final    WBC 12/20/2023 4.25  3.40 - 10.80 10*3/mm3 Final    RBC 12/20/2023 4.09 (L)  4.14 - 5.80 10*6/mm3 Final    Hemoglobin 12/20/2023 13.0  13.0 - 17.7 g/dL Final    Hematocrit 12/20/2023 39.3  37.5 - 51.0 % Final    MCV 12/20/2023 96.1  79.0 - 97.0 fL Final    MCH  12/20/2023 31.8  26.6 - 33.0 pg Final    MCHC 12/20/2023 33.1  31.5 - 35.7 g/dL Final    RDW 12/20/2023 14.7  12.3 - 15.4 % Final    RDW-SD 12/20/2023 51.1  37.0 - 54.0 fl Final    MPV 12/20/2023 9.3  6.0 - 12.0 fL Final    Platelets 12/20/2023 171  140 - 450 10*3/mm3 Final    Neutrophil % 12/20/2023 60.6  42.7 - 76.0 % Final    Lymphocyte % 12/20/2023 19.5 (L)  19.6 - 45.3 % Final    Monocyte % 12/20/2023 18.8 (H)  5.0 - 12.0 % Final    Eosinophil % 12/20/2023 0.2 (L)  0.3 - 6.2 % Final    Basophil % 12/20/2023 0.7  0.0 - 1.5 % Final    Immature Grans % 12/20/2023 0.2  0.0 - 0.5 % Final    Neutrophils, Absolute 12/20/2023 2.57  1.70 - 7.00 10*3/mm3 Final    Lymphocytes, Absolute 12/20/2023 0.83  0.70 - 3.10 10*3/mm3 Final    Monocytes, Absolute 12/20/2023 0.80  0.10 - 0.90 10*3/mm3 Final    Eosinophils, Absolute 12/20/2023 0.01  0.00 - 0.40 10*3/mm3 Final    Basophils, Absolute 12/20/2023 0.03  0.00 - 0.20 10*3/mm3 Final    Immature Grans, Absolute 12/20/2023 0.01  0.00 - 0.05 10*3/mm3 Final    nRBC 12/20/2023 0.0  0.0 - 0.2 /100 WBC Final         DIAGNOSTICS:  CT Head Without Contrast    Result Date: 12/20/2023  1. No convincing acute intracranial abnormality is identified. 2. There is mild-to-moderate small vessel disease in the cerebral white matter and diffuse cerebral atrophy and osteoarthritic changes at the temporomandibular joints bilaterally. The remainder the head CT is normal and the etiology of this patient's dizziness is not established on this exam. If there remains any clinical suspicion of an acute stroke causing the dizziness, an MRI of the brain could be obtained for more complete assessment.  Radiation dose reduction techniques were utilized, including automated exposure control and exposure modulation based on body size.       CT Abdomen Pelvis Without Contrast    Result Date: 12/20/2023   1.  Marked bladder distention. Correlate for possible urinary retention. Consider Mariscal catheter  decompression. 2.  No renal stones or hydronephrosis. 3.  Moderate stool burden.   This report was finalized on 12/20/2023 1:26 PM by Dr. Lo Tello M.D on Workstation: [id number]     diffuse osseous demineralization. There is ventral flowing syndesmophytes  throughout the spine, consistent with diffuse idiopathic skeletal  hyperostosis (DISH)    XR Chest 1 View    Result Date: 12/20/2023  1. No acute process.   This report was finalized on 12/20/2023 11:06 AM by Dr. Dario Dumont M.D on Workstation: [id number]            Results Review:   I reviewed the patient's new clinical results.  Discussed with ER physician  Old records reviewed / Medical Decision Making High Complexity  I personally viewed and interpreted the patient's EKG/Telemetry data- sinus rhythm      ASSESSMENT AND PLAN    Immobility    Ankylosing spondylitis of cervical region    Hypertension    Fall from bed    Tetrahydrocannabinol (THC) use disorder, mild, abuse    Generalized pain        Grief    Urine retention    Constipation     Acute on chronic pain but pt states its generalized.  Patient has ankylosing spondylitis and ventral flowing syndesmophytes throughout the spine, consistent with diffuse idiopathic skeletalhyperostosis (DISH), polyarthritis with history of uveitis  -Was on methotrexate and steroids and had been on Remicade Brunilda and Enbrel in the past and had seen Dr. Fields previously before going [place] where he supposedly saw Dr. Hammonds  -Will place on an acute steroid for now to help decrease inflamation  -Patient likely needs to follow up with Dr. Fields  - Exercises include postural training, range of motion exercises, stretching, recreational activities, and sometimes hydrotherapy. Spinal manipulation should be avoided in patients with spinal fusion or advanced spinal osteoporosis  -Get an L-spine x-ray-if severely abnormal may consider spine eval.  Patient does not have a history of fusion  -PT and OT to evaluate  and treat  -try lidoderm patches-   -Patient may benefit to going to pain management  -If no other contraindications pt may benefit from duloxetine      Urine retention  -Denies any urinary accidents, denies any numbness in groin or rectum.  -Could be anticholinergic or due to pain.  Will place Mariscal and start on Flomax    Constipation  -start on stool regimen    Very poor mobility due to chronic pain.  Uses a cane.  Will get PT and OT to evaluate and treat     grief-  -wife  2023   -new home- patient has now moved back to [facility] to be closer to his kids.  Close living in an apartment by himself.    Per pt wt loss- will get nutrition to eval     Fall   -Patient states that the fall was not that bad and it was on the pillows    Hypertension-continue medical management-     Thc use due to pain    HOME MEDS HELD:   -Restart when clinically appropriate      Chronic medical conditions being monitored- stable, continue medical management  -  -      +DVT proph:      + CODE STATUS: Full       I discussed the patient's findings and my recommendations with the patient and/or family.  Please reference all orders placed.    Gail Anna MD  23  17:50 EST      This document is intended for medical expert use only. Reading of this document by patients and/or patient's family without participating in medical staff guidance may result in misinterpretation and unintended morbidity. Any interpretation of such data is the responsibility of the patient and/or family member responsible for the patient in concert with their primary or specialist providers, and NOT to be left for sources of online searches such as [employer], [employer] or similar queries. Relying on these approaches to knowledge may result in misinterpretation, misguided goals of care and even death should patients or family members try recommendations outside of the realm of professional medical care in a supervised way.    Dictated utilizing Voice  dictation:  Parts of this note may be an electronic transcription/translation of spoke language to printed text using Dragon dictation system.

## 2023-12-20 NOTE — ED NOTES
Nursing report ED to floor  Anthony Gallegos  78 y.o.  male    HPI :   Chief Complaint   Patient presents with    Leg Pain       Admitting doctor:   Gail Anna MD    Admitting diagnosis:   The primary encounter diagnosis was Muscle pain. Diagnoses of Dizziness, Immobility, Fall, initial encounter, Elevated CK, and Arthralgia, unspecified joint were also pertinent to this visit.    Code status:   Current Code Status       Date Active Code Status Order ID Comments User Context       Not on file            Allergies:   Patient has no known allergies.    Isolation:   No active isolations    Intake and Output    Intake/Output Summary (Last 24 hours) at 12/20/2023 1550  Last data filed at 12/20/2023 0947  Gross per 24 hour   Intake 200 ml   Output --   Net 200 ml       Weight:       12/20/23  1005   Weight: 81.9 kg (180 lb 8 oz)       Most recent vitals:   Vitals:    12/20/23 1347 12/20/23 1401 12/20/23 1406 12/20/23 1407   BP:  138/69     Pulse: 55 55 73 66   Resp:       Temp:       SpO2: 100% 99% 100% 100%   Weight:       Height:           Active LDAs/IV Access:   Lines, Drains & Airways       Active LDAs       Name Placement date Placement time Site Days    Peripheral IV 12/20/23 0947 Anterior;Distal;Left;Upper Arm 12/20/23  0947  Arm  less than 1                    Labs (abnormal labs have a star):   Labs Reviewed   PROTIME-INR - Abnormal; Notable for the following components:       Result Value    Protime 15.3 (*)     INR 1.20 (*)     All other components within normal limits   URINALYSIS W/ MICROSCOPIC IF INDICATED (NO CULTURE) - Abnormal; Notable for the following components:    Appearance, UA Cloudy (*)     Protein, UA Trace (*)     All other components within normal limits    Narrative:     Urine microscopic not indicated.   C-REACTIVE PROTEIN - Abnormal; Notable for the following components:    C-Reactive Protein 0.93 (*)     All other components within normal limits   SEDIMENTATION RATE - Abnormal;  Notable for the following components:    Sed Rate 57 (*)     All other components within normal limits   URINE DRUG SCREEN - Abnormal; Notable for the following components:    Opiate Screen Positive (*)     THC, Screen, Urine Positive (*)     All other components within normal limits    Narrative:     Negative Thresholds Per Drugs Screened:    Amphetamines                 500 ng/ml  Barbiturates                 200 ng/ml  Benzodiazepines              100 ng/ml  Cocaine                      300 ng/ml  Methadone                    300 ng/ml  Opiates                      300 ng/ml  Oxycodone                    100 ng/ml  THC                           50 ng/ml  Fentanyl                       5 ng/ml      The Normal Value for all drugs tested is negative. This report includes final unconfirmed screening results to be used for medical treatment purposes only. Unconfirmed results must not be used for non-medical purposes such as employment or legal testing. Clinical consideration should be applied to any drug of abuse test, particularly when unconfirmed results are used.           CK - Abnormal; Notable for the following components:    Creatine Kinase 652 (*)     All other components within normal limits   CBC WITH AUTO DIFFERENTIAL - Abnormal; Notable for the following components:    RBC 4.09 (*)     Lymphocyte % 19.5 (*)     Monocyte % 18.8 (*)     Eosinophil % 0.2 (*)     All other components within normal limits   LIPASE - Normal   BNP (IN-HOUSE) - Normal    Narrative:     This assay is used as an aid in the diagnosis of individuals suspected of having heart failure. It can be used as an aid in the diagnosis of acute decompensated heart failure (ADHF) in patients presenting with signs and symptoms of ADHF to the emergency department (ED). In addition, NT-proBNP of <300 pg/mL indicates ADHF is not likely.    Age Range Result Interpretation  NT-proBNP Concentration (pg/mL:      <50             Positive            >450                    Gray                 300-450                    Negative             <300    50-75           Positive            >900                  Gray                300-900                  Negative            <300      >75             Positive            >1800                  Gray                300-1800                  Negative            <300   MAGNESIUM - Normal   TSH - Normal   COMPREHENSIVE METABOLIC PANEL    Narrative:     GFR Normal >60  Chronic Kidney Disease <60  Kidney Failure <15    The GFR formula is only valid for adults with stable renal function between ages 18 and 70.   ETHANOL   CBC AND DIFFERENTIAL    Narrative:     The following orders were created for panel order CBC & Differential.  Procedure                               Abnormality         Status                     ---------                               -----------         ------                     CBC Auto Differential[[id number]]        Abnormal            Final result                 Please view results for these tests on the individual orders.       EKG:   No orders to display       Meds given in ED:   Medications   sodium chloride 0.9 % flush 10 mL (has no administration in time range)   sodium chloride 0.9 % infusion (100 mL/hr Intravenous Currently Infusing 12/20/23 1359)   morphine injection 2 mg (2 mg Intravenous Given 12/20/23 1038)   ondansetron (ZOFRAN) injection 4 mg (4 mg Intravenous Given 12/20/23 1038)       Imaging results:  CT Head Without Contrast    Result Date: 12/20/2023  1. No convincing acute intracranial abnormality is identified. 2. There is mild-to-moderate small vessel disease in the cerebral white matter and diffuse cerebral atrophy and osteoarthritic changes at the temporomandibular joints bilaterally. The remainder the head CT is normal and the etiology of this patient's dizziness is not established on this exam. If there remains any clinical suspicion of an acute stroke causing the dizziness, an MRI  of the brain could be obtained for more complete assessment.  Radiation dose reduction techniques were utilized, including automated exposure control and exposure modulation based on body size.       CT Abdomen Pelvis Without Contrast    Result Date: 12/20/2023   1.  Marked bladder distention. Correlate for possible urinary retention. Consider Mariscal catheter decompression. 2.  No renal stones or hydronephrosis. 3.  Moderate stool burden.   This report was finalized on 12/20/2023 1:26 PM by Dr. Lo Tello M.D on Workstation: [id number]      XR Chest 1 View    Result Date: 12/20/2023  1. No acute process.   This report was finalized on 12/20/2023 11:06 AM by Dr. Dario Dumont M.D on Workstation: [id number]       Ambulatory status:   ax2    Social issues:   Social History     Socioeconomic History    Marital status:     Number of children: 2   Tobacco Use    Smoking status: Never    Smokeless tobacco: Never   Substance and Sexual Activity    Alcohol use: No    Drug use: No    Sexual activity: Yes       NIH Stroke Scale:       Registered Nurse, RN  12/20/23 15:50 EST

## 2023-12-20 NOTE — ED PROVIDER NOTES
" EMERGENCY DEPARTMENT ENCOUNTER    Room Number:    PCP: Marco Macedo MD  Patient Care Team:  Marco Macedo MD as PCP - General (Family Medicine)   Independent Historians: Patient and son    HPI:  Chief Complaint: Pain and dizziness    A complete HPI/ROS/PMH/PSH/SH/FH are unobtainable due to: Patient is not a thorough historian    Chronic or social conditions impacting patient care (Social Determinants of Health): Recently moved back to [place] after his wife   (Financial Resource Strain / Food Insecurity / Transportation Needs / Physical Activity / Stress / Social Connections / Intimate Partner Violence / Housing Stability)    Context: Anthony Gallegos is a 78 y.o. male with history of hypertension who presents to the ED c/o acute generalized weakness and body wide pain.  Patient says he has had a lot of issues going on for \"a long time\".  Patient is new back to [place] for the past week after moving his residence after his wife .  Today patient dropped his cell phone and reached over from bed to get it and fell.  Patient was stuck in a small area unable to call his daughter.  His daughter called his son who came over to try and help him when he got up from the floor he noted him to be very weak and dizzy.  Patient seems uncomfortable to even sit complaining of body wide pain 10 out of 10 most of the time since he is arrived back to [place].  Patient himself describes significant weight loss but cannot quantify it nor tell me over what kind of timeframe.  He just complains of feeling \"bad\".    Review of prior external notes (non-ED) -and- Review of prior external test results outside of this encounter: I reviewed primary care provider notes from Dr. Macedo from 2018.  Patient was seen at that time for hypertension and BPH and a wound on his left heel.  Notes reflect that patient plans to move to [place] and obtain primary care there.    Prescription drug monitoring program review:     "     PAST MEDICAL HISTORY  Active Ambulatory Problems     Diagnosis Date Noted    Ankylosing spondylitis of cervical region 06/29/2017    Recent unexplained weight loss 07/14/2017    Abnormal serum lipase level 08/10/2017    Abnormal serum level of amylase 08/10/2017    Hypertension 10/19/2017    Boil 03/22/2018     Resolved Ambulatory Problems     Diagnosis Date Noted    No Resolved Ambulatory Problems     Past Medical History:   Diagnosis Date    Abscess of scrotum     Arthritis     AS (ankylosing spondylitis)     Uveitis          PAST SURGICAL HISTORY  Past Surgical History:   Procedure Laterality Date    COLONOSCOPY      ROTATOR CUFF REPAIR Right     TOTAL HIP ARTHROPLASTY Left 2014         FAMILY HISTORY  Family History   Problem Relation Age of Onset    Alzheimer's disease Mother          SOCIAL HISTORY  Social History     Socioeconomic History    Marital status:     Number of children: 2   Tobacco Use    Smoking status: Never    Smokeless tobacco: Never   Substance and Sexual Activity    Alcohol use: No    Drug use: No    Sexual activity: Yes         ALLERGIES  Patient has no known allergies.        REVIEW OF SYSTEMS  Review of Systems  Included in HPI  All systems reviewed and negative except for those discussed in HPI.      PHYSICAL EXAM    I have reviewed the triage vital signs and nursing notes.    ED Triage Vitals [12/20/23 0946]   Temp Heart Rate Resp BP SpO2   98 °F (36.7 °C) 62 18 137/70 100 %      Temp src Heart Rate Source Patient Position BP Location FiO2 (%)   -- -- -- -- --       Physical Exam  GENERAL: Pleasant thin cooperative and conversant but chronic ill appearing M, alert, no acute distress  SKIN: Warm, dry  HENT: Normocephalic, atraumatic  EYES: no scleral icterus, EOMI, no conjunctivitis  Neck: No carotid bruits  CV: regular rhythm, regular rate, no murmur  RESPIRATORY: normal effort, lungs clear, no wheezing, no rhonchi  ABDOMEN: soft, nontender, nondistended, no rebound, no  guarding  MUSCULOSKELETAL: no deformity, no lower extremity edema, no calf tenderness to palpation  NEURO: alert,, face symmetric, speech normal, moves all extremities, follows commands                                                               LAB RESULTS  Recent Results (from the past 24 hour(s))   Comprehensive Metabolic Panel    Collection Time: 12/20/23 10:29 AM    Specimen: Blood   Result Value Ref Range    Glucose 99 65 - 99 mg/dL    BUN 18 8 - 23 mg/dL    Creatinine 1.00 0.76 - 1.27 mg/dL    Sodium 138 136 - 145 mmol/L    Potassium 3.8 3.5 - 5.2 mmol/L    Chloride 98 98 - 107 mmol/L    CO2 28.0 22.0 - 29.0 mmol/L    Calcium 9.1 8.6 - 10.5 mg/dL    Total Protein 7.4 6.0 - 8.5 g/dL    Albumin 3.8 3.5 - 5.2 g/dL    ALT (SGPT) 12 1 - 41 U/L    AST (SGOT) 35 1 - 40 U/L    Alkaline Phosphatase 47 39 - 117 U/L    Total Bilirubin 0.4 0.0 - 1.2 mg/dL    Globulin 3.6 gm/dL    A/G Ratio 1.1 g/dL    BUN/Creatinine Ratio 18.0 7.0 - 25.0    Anion Gap 12.0 5.0 - 15.0 mmol/L    eGFR 76.6 >60.0 mL/min/1.73   Protime-INR    Collection Time: 12/20/23 10:29 AM    Specimen: Blood   Result Value Ref Range    Protime 15.3 (H) 11.7 - 14.2 Seconds    INR 1.20 (H) 0.90 - 1.10   Lipase    Collection Time: 12/20/23 10:29 AM    Specimen: Blood   Result Value Ref Range    Lipase 60 13 - 60 U/L   BNP    Collection Time: 12/20/23 10:29 AM    Specimen: Blood   Result Value Ref Range    proBNP 263.0 0.0 - 1,800.0 pg/mL   C-reactive Protein    Collection Time: 12/20/23 10:29 AM    Specimen: Blood   Result Value Ref Range    C-Reactive Protein 0.93 (H) 0.00 - 0.50 mg/dL   Sedimentation Rate    Collection Time: 12/20/23 10:29 AM    Specimen: Blood   Result Value Ref Range    Sed Rate 57 (H) 0 - 20 mm/hr   Ethanol    Collection Time: 12/20/23 10:29 AM    Specimen: Blood   Result Value Ref Range    Ethanol <10 0 - 10 mg/dL    Ethanol % <0.010 %   CK    Collection Time: 12/20/23 10:29 AM    Specimen: Blood   Result Value Ref Range    Creatine  Kinase 652 (H) 20 - 200 U/L   Magnesium    Collection Time: 12/20/23 10:29 AM    Specimen: Blood   Result Value Ref Range    Magnesium 1.7 1.6 - 2.4 mg/dL   TSH    Collection Time: 12/20/23 10:29 AM    Specimen: Blood   Result Value Ref Range    TSH 2.840 0.270 - 4.200 uIU/mL   CBC Auto Differential    Collection Time: 12/20/23 10:29 AM    Specimen: Blood   Result Value Ref Range    WBC 4.25 3.40 - 10.80 10*3/mm3    RBC 4.09 (L) 4.14 - 5.80 10*6/mm3    Hemoglobin 13.0 13.0 - 17.7 g/dL    Hematocrit 39.3 37.5 - 51.0 %    MCV 96.1 79.0 - 97.0 fL    MCH 31.8 26.6 - 33.0 pg    MCHC 33.1 31.5 - 35.7 g/dL    RDW 14.7 12.3 - 15.4 %    RDW-SD 51.1 37.0 - 54.0 fl    MPV 9.3 6.0 - 12.0 fL    Platelets 171 140 - 450 10*3/mm3    Neutrophil % 60.6 42.7 - 76.0 %    Lymphocyte % 19.5 (L) 19.6 - 45.3 %    Monocyte % 18.8 (H) 5.0 - 12.0 %    Eosinophil % 0.2 (L) 0.3 - 6.2 %    Basophil % 0.7 0.0 - 1.5 %    Immature Grans % 0.2 0.0 - 0.5 %    Neutrophils, Absolute 2.57 1.70 - 7.00 10*3/mm3    Lymphocytes, Absolute 0.83 0.70 - 3.10 10*3/mm3    Monocytes, Absolute 0.80 0.10 - 0.90 10*3/mm3    Eosinophils, Absolute 0.01 0.00 - 0.40 10*3/mm3    Basophils, Absolute 0.03 0.00 - 0.20 10*3/mm3    Immature Grans, Absolute 0.01 0.00 - 0.05 10*3/mm3    nRBC 0.0 0.0 - 0.2 /100 WBC   Urinalysis With Microscopic If Indicated (No Culture) - Urine, Clean Catch    Collection Time: 12/20/23 11:50 AM    Specimen: Urine, Clean Catch   Result Value Ref Range    Color, UA Yellow Yellow, Straw    Appearance, UA Cloudy (A) Clear    pH, UA 7.5 5.0 - 8.0    Specific Gravity, UA 1.019 1.005 - 1.030    Glucose, UA Negative Negative    Ketones, UA Negative Negative    Bilirubin, UA Negative Negative    Blood, UA Negative Negative    Protein, UA Trace (A) Negative    Leuk Esterase, UA Negative Negative    Nitrite, UA Negative Negative    Urobilinogen, UA 1.0 E.U./dL 0.2 - 1.0 E.U./dL   Urine Drug Screen - Urine, Clean Catch    Collection Time: 12/20/23 11:50 AM     Specimen: Urine, Clean Catch   Result Value Ref Range    Amphet/Methamphet, Screen Negative Negative    Barbiturates Screen, Urine Negative Negative    Benzodiazepine Screen, Urine Negative Negative    Cocaine Screen, Urine Negative Negative    Opiate Screen Positive (A) Negative    THC, Screen, Urine Positive (A) Negative    Methadone Screen, Urine Negative Negative    Oxycodone Screen, Urine Negative Negative    Fentanyl, Urine Negative Negative       I ordered the above labs and independently reviewed the results.        RADIOLOGY  CT Head Without Contrast    Result Date: 12/20/2023  EMERGENCY CT SCAN OF THE HEAD WITHOUT CONTRAST ON 12/20/2023  CLINICAL HISTORY: Dizziness. The patient personally reports dizziness and syncope, also has some abdominal pain and weakness.  TECHNIQUE: Spiral CT images were obtained from the base of the skull to the vertex without intravenous contrast. The images were reformatted and submitted in 3 mm thick axial, sagittal and coronal CT sections with brain algorithm.  There are no prior head CTs or MRIs of the brain for comparison.  FINDINGS: There is some patchy low-density in the periventricular white matter most pronounced in the frontal periventricular white matter with some extension into the subcortical white matter consistent with mild-to-moderate small vessel disease. The remainder of the brain parenchyma is normal in attenuation. There is diffuse cerebral atrophy. The lateral and third ventricles are upper limits of normal in size. I see no mass effect, no midline shift and no extra-axial fluid collections are identified. There is no evidence of acute intracranial hemorrhage. The calvarium and the skull base are normal in appearance. The paranasal sinuses, mastoid air cells and middle ear cavities are clear.  The visualized superior halves of the orbits are normal in appearance. There are osteoarthritic changes in the temporomandibular joints with marginal spurring off the  mandibular heads bilaterally. There are arthritic changes at the atlantoaxial articulation.      1. No convincing acute intracranial abnormality is identified. 2. There is mild-to-moderate small vessel disease in the cerebral white matter and diffuse cerebral atrophy and osteoarthritic changes at the temporomandibular joints bilaterally. The remainder the head CT is normal and the etiology of this patient's dizziness is not established on this exam. If there remains any clinical suspicion of an acute stroke causing the dizziness, an MRI of the brain could be obtained for more complete assessment.  Radiation dose reduction techniques were utilized, including automated exposure control and exposure modulation based on body size.       CT Abdomen Pelvis Without Contrast    Result Date: 12/20/2023  CT ABDOMEN PELVIS WO CONTRAST-  HISTORY: Flank pain, dizziness, syncope, weakness.  TECHNIQUE:  CT of the abdomen and pelvis was performed without intravenous contrast. Evaluation of solid organs and vasculature is limited without intravenous contrast. Radiation dose reduction techniques were utilized, including automated exposure control and exposure modulation based on body size.  COMPARISON: CT abdomen and pelvis 7/7/2017  FINDINGS:  Heart size is normal. There is no pericardial effusion. There is minimal dependent atelectasis and or scarring in the posterior lung bases. Pleural spaces are clear. The liver is normal in size. The gallbladder is present. The spleen is normal in size. There are no pancreatic calcifications. The adrenal glands are within normal limits. There is a 5.4 cm cyst in the inferior right kidney, which is increased in size from 2017, previously 3.1 cm when remeasured. There are no renal stones or hydronephrosis. There is moderate calcific atherosclerosis. Evaluation of abdominal and pelvic lymph nodes is limited by lack of intravenous and oral contrast and paucity of intra-abdominal/pelvic fat. There  is no bowel obstruction. There is a moderate predominately right-sided colonic stool burden. The appendix is visualized. The bladder is markedly distended to the level of the umbilicus. The prostate is enlarged. There is a partially imaged left hip total arthroplasty. Metallic streak artifact limits evaluation of adjacent structures. There is diffuse osseous demineralization. There is ventral flowing syndesmophytes throughout the spine, consistent with diffuse idiopathic skeletal hyperostosis (DISH).        1.  Marked bladder distention. Correlate for possible urinary retention. Consider Mariscal catheter decompression. 2.  No renal stones or hydronephrosis. 3.  Moderate stool burden.   This report was finalized on 12/20/2023 1:26 PM by Dr. Lo Tello M.D on Workstation: [id number]      XR Chest 1 View    Result Date: 12/20/2023  XR CHEST 1 VW-12/20/2023  HISTORY: Back pain. Weakness.  Heart size is within normal limits. Lungs appear free of acute infiltrates. Bones and soft tissues are unremarkable. Chest appears unchanged from the 6/21/2017 study.      1. No acute process.   This report was finalized on 12/20/2023 11:06 AM by Dr. Dario Dumont M.D on Workstation: [id number]       I ordered the above noted radiological studies. Reviewed by me. See dictation for official radiology interpretation.      PROCEDURES    Procedures      MEDICATIONS GIVEN IN ER    Medications   sodium chloride 0.9 % flush 10 mL (has no administration in time range)   sodium chloride 0.9 % infusion (100 mL/hr Intravenous Currently Infusing 12/20/23 1359)   morphine injection 2 mg (2 mg Intravenous Given 12/20/23 1038)   ondansetron (ZOFRAN) injection 4 mg (4 mg Intravenous Given 12/20/23 1038)         ORDERS PLACED DURING THIS VISIT:  Orders Placed This Encounter   Procedures    XR Chest 1 View    CT Head Without Contrast    CT Abdomen Pelvis Without Contrast    Comprehensive Metabolic Panel    Protime-INR    Lipase    Urinalysis With  Microscopic If Indicated (No Culture) - Urine, Clean Catch    BNP    C-reactive Protein    Sedimentation Rate    Ethanol    Urine Drug Screen - Urine, Clean Catch    CK    Magnesium    TSH    CBC Auto Differential    [facility] (on-call MD unless specified) Details    Insert Peripheral IV    Initiate Observation Status    CBC & Differential         PROGRESS, DATA ANALYSIS, CONSULTS, AND MEDICAL DECISION MAKING    All labs have been independently interpreted by me.  All radiology studies have been reviewed by me.   EKG's independently viewed and interpreted by me.  Discussion below represents my analysis of pertinent findings related to patient's condition, differential diagnosis, treatment plan and final disposition.    MDM patient presenting with generalized weakness, dizziness, and body wide pain.  Patient also reporting some weight loss and is a poor historian overall recently moved back to [place] after his wife .  Son seems to remark on patient complaining of severe pain even with sitting, walking, or any activity.  Patient does not however have any specific complaints other than the fall he had this morning out of bed.  Will evaluate for electrolyte abnormalities, anemia, infection, rhabdo, intra-abdominal mass or pathologic fracture or bony mets, chest/lung mass or effusions, intracranial hemorrhage or mass effect from fall or mass.  Patient amenable to broad workup.  Patient will need to establish primary care here in [facility].    ED Course as of 23 1503   Wed Dec 20, 2023   1405 I discussed all results with patient and his son.  Plan for ambulation trial and discharge home with establishment of a new primary care physician here in [place] if he does well.  Will reassess [AR]   1423 Patient markedly unsteady on his feet.  Even with a cane for stabilization, patient is huge falls risk.  Plan admission for further evaluation. [AR]   1425 On record review patient does carry a history of ankylosing  spondylitis but does not report this to me.  He used to be on methotrexate and some other therapies for presumably this condition.  Not certain the patient is having a flareup of this condition or some other issue but patient can definitely not go home alone in his current state. [AR]   3126 I discussed patient's case with Dr. Mehta on for [facility] who was amenable to obs admission for further care. [AR]      ED Course User Index  [AR] Ashleigh Early MD       I interpreted the cardiac monitor rhythm and my independent interpretation is: normal sinus rhythm, rate of 60s. The cardiac monitor was ordered to monitor for arrhythmias.    PPE: I wore and adhered to appropriate PPE per hospital protocols for specific patient presentation. (For respiratory patients with suspected Covid-19 or other infectious etiology suspected for patient's symptoms, the patient wore a mask and I wore an N95 mask throughout the entire patient encounter.) Proper hand hygiene both before and after patient encounter was performed as well.         AS OF 15:03 EST VITALS:    BP - 138/69  HR - 55  TEMP - 98 °F (36.7 °C)  O2 SATS - 99%        DIAGNOSIS  Final diagnoses:   Muscle pain   Dizziness   Immobility   Fall, initial encounter   Elevated CK   Arthralgia, unspecified joint         DISPOSITION  ED Disposition       ED Disposition   Decision to Admit    Condition   --    Comment   Level of Care: Med/Surg [1]   Diagnosis: Immobility [775247]   Admitting Physician: TAMIA MEHTA [[id number]]   Attending Physician: TAMIA MEHTA [[id number]]                    Note Disclaimer: At [facility], we believe that sharing information builds trust and better relationships. You are receiving this note because you recently visited [facility]. It is possible you will see health information before a provider has talked with you about it. This kind of information can be easy to misunderstand. To help you fully understand what it means for  your health, we urge you to discuss this note with your provider.         Ashleigh Early MD  12/20/23 2924

## 2023-12-21 ENCOUNTER — APPOINTMENT (OUTPATIENT)
Dept: CARDIOLOGY | Facility: HOSPITAL | Age: 78
End: 2023-12-21
Payer: MEDICARE

## 2023-12-21 ENCOUNTER — APPOINTMENT (OUTPATIENT)
Dept: GENERAL RADIOLOGY | Facility: HOSPITAL | Age: 78
End: 2023-12-21
Payer: MEDICARE

## 2023-12-21 PROBLEM — R53.1 GENERALIZED WEAKNESS: Status: ACTIVE | Noted: 2023-12-21

## 2023-12-21 LAB
ALBUMIN SERPL-MCNC: 2.9 G/DL (ref 3.5–5.2)
ALBUMIN/GLOB SERPL: 1 G/DL
ALP SERPL-CCNC: 35 U/L (ref 39–117)
ALT SERPL W P-5'-P-CCNC: 11 U/L (ref 1–41)
ANION GAP SERPL CALCULATED.3IONS-SCNC: 8.1 MMOL/L (ref 5–15)
AST SERPL-CCNC: 45 U/L (ref 1–40)
BASOPHILS # BLD AUTO: 0.03 10*3/MM3 (ref 0–0.2)
BASOPHILS NFR BLD AUTO: 0.8 % (ref 0–1.5)
BH CV LOWER VASCULAR LEFT COMMON FEMORAL AUGMENT: NORMAL
BH CV LOWER VASCULAR LEFT COMMON FEMORAL COMPETENT: NORMAL
BH CV LOWER VASCULAR LEFT COMMON FEMORAL COMPRESS: NORMAL
BH CV LOWER VASCULAR LEFT COMMON FEMORAL PHASIC: NORMAL
BH CV LOWER VASCULAR LEFT COMMON FEMORAL SPONT: NORMAL
BH CV LOWER VASCULAR LEFT DISTAL FEMORAL COMPRESS: NORMAL
BH CV LOWER VASCULAR LEFT GASTRONEMIUS COMPRESS: NORMAL
BH CV LOWER VASCULAR LEFT GREATER SAPH AK COMPRESS: NORMAL
BH CV LOWER VASCULAR LEFT GREATER SAPH BK COMPRESS: NORMAL
BH CV LOWER VASCULAR LEFT LESSER SAPH COMPRESS: NORMAL
BH CV LOWER VASCULAR LEFT MID FEMORAL AUGMENT: NORMAL
BH CV LOWER VASCULAR LEFT MID FEMORAL COMPETENT: NORMAL
BH CV LOWER VASCULAR LEFT MID FEMORAL COMPRESS: NORMAL
BH CV LOWER VASCULAR LEFT MID FEMORAL PHASIC: NORMAL
BH CV LOWER VASCULAR LEFT MID FEMORAL SPONT: NORMAL
BH CV LOWER VASCULAR LEFT PERONEAL COMPRESS: NORMAL
BH CV LOWER VASCULAR LEFT POPLITEAL AUGMENT: NORMAL
BH CV LOWER VASCULAR LEFT POPLITEAL COMPETENT: NORMAL
BH CV LOWER VASCULAR LEFT POPLITEAL COMPRESS: NORMAL
BH CV LOWER VASCULAR LEFT POPLITEAL PHASIC: NORMAL
BH CV LOWER VASCULAR LEFT POPLITEAL SPONT: NORMAL
BH CV LOWER VASCULAR LEFT POSTERIOR TIBIAL COMPRESS: NORMAL
BH CV LOWER VASCULAR LEFT PROFUNDA FEMORAL COMPRESS: NORMAL
BH CV LOWER VASCULAR LEFT PROXIMAL FEMORAL COMPRESS: NORMAL
BH CV LOWER VASCULAR LEFT SAPHENOFEMORAL JUNCTION COMPRESS: NORMAL
BH CV LOWER VASCULAR RIGHT COMMON FEMORAL AUGMENT: NORMAL
BH CV LOWER VASCULAR RIGHT COMMON FEMORAL COMPETENT: NORMAL
BH CV LOWER VASCULAR RIGHT COMMON FEMORAL COMPRESS: NORMAL
BH CV LOWER VASCULAR RIGHT COMMON FEMORAL PHASIC: NORMAL
BH CV LOWER VASCULAR RIGHT COMMON FEMORAL SPONT: NORMAL
BILIRUB SERPL-MCNC: 0.3 MG/DL (ref 0–1.2)
BUN SERPL-MCNC: 13 MG/DL (ref 8–23)
BUN/CREAT SERPL: 16.3 (ref 7–25)
CALCIUM SPEC-SCNC: 7.9 MG/DL (ref 8.6–10.5)
CHLORIDE SERPL-SCNC: 103 MMOL/L (ref 98–107)
CK SERPL-CCNC: 940 U/L (ref 20–200)
CO2 SERPL-SCNC: 25.9 MMOL/L (ref 22–29)
CREAT SERPL-MCNC: 0.8 MG/DL (ref 0.76–1.27)
DEPRECATED RDW RBC AUTO: 48.5 FL (ref 37–54)
EGFRCR SERPLBLD CKD-EPI 2021: 90 ML/MIN/1.73
EOSINOPHIL # BLD AUTO: 0.15 10*3/MM3 (ref 0–0.4)
EOSINOPHIL NFR BLD AUTO: 4 % (ref 0.3–6.2)
ERYTHROCYTE [DISTWIDTH] IN BLOOD BY AUTOMATED COUNT: 14.4 % (ref 12.3–15.4)
GLOBULIN UR ELPH-MCNC: 3 GM/DL
GLUCOSE SERPL-MCNC: 87 MG/DL (ref 65–99)
HCT VFR BLD AUTO: 33.3 % (ref 37.5–51)
HGB BLD-MCNC: 11 G/DL (ref 13–17.7)
IMM GRANULOCYTES # BLD AUTO: 0.01 10*3/MM3 (ref 0–0.05)
IMM GRANULOCYTES NFR BLD AUTO: 0.3 % (ref 0–0.5)
LYMPHOCYTES # BLD AUTO: 1.41 10*3/MM3 (ref 0.7–3.1)
LYMPHOCYTES NFR BLD AUTO: 37.7 % (ref 19.6–45.3)
MAGNESIUM SERPL-MCNC: 2.1 MG/DL (ref 1.6–2.4)
MCH RBC QN AUTO: 31 PG (ref 26.6–33)
MCHC RBC AUTO-ENTMCNC: 33 G/DL (ref 31.5–35.7)
MCV RBC AUTO: 93.8 FL (ref 79–97)
MONOCYTES # BLD AUTO: 0.52 10*3/MM3 (ref 0.1–0.9)
MONOCYTES NFR BLD AUTO: 13.9 % (ref 5–12)
NEUTROPHILS NFR BLD AUTO: 1.62 10*3/MM3 (ref 1.7–7)
NEUTROPHILS NFR BLD AUTO: 43.3 % (ref 42.7–76)
NRBC BLD AUTO-RTO: 0 /100 WBC (ref 0–0.2)
PLATELET # BLD AUTO: 149 10*3/MM3 (ref 140–450)
PMV BLD AUTO: 9.2 FL (ref 6–12)
POTASSIUM SERPL-SCNC: 3.5 MMOL/L (ref 3.5–5.2)
POTASSIUM SERPL-SCNC: 4.6 MMOL/L (ref 3.5–5.2)
PROT SERPL-MCNC: 5.9 G/DL (ref 6–8.5)
RBC # BLD AUTO: 3.55 10*6/MM3 (ref 4.14–5.8)
SODIUM SERPL-SCNC: 137 MMOL/L (ref 136–145)
WBC NRBC COR # BLD AUTO: 3.74 10*3/MM3 (ref 3.4–10.8)

## 2023-12-21 PROCEDURE — G0378 HOSPITAL OBSERVATION PER HR: HCPCS

## 2023-12-21 PROCEDURE — 63710000001 PREDNISONE PER 1 MG: Performed by: INTERNAL MEDICINE

## 2023-12-21 PROCEDURE — 63710000001 METHOTREXATE 2.5 MG TABLET: Performed by: INTERNAL MEDICINE

## 2023-12-21 PROCEDURE — 90791 PSYCH DIAGNOSTIC EVALUATION: CPT

## 2023-12-21 PROCEDURE — 97162 PT EVAL MOD COMPLEX 30 MIN: CPT

## 2023-12-21 PROCEDURE — 83735 ASSAY OF MAGNESIUM: CPT | Performed by: INTERNAL MEDICINE

## 2023-12-21 PROCEDURE — 85025 COMPLETE CBC W/AUTO DIFF WBC: CPT | Performed by: INTERNAL MEDICINE

## 2023-12-21 PROCEDURE — 82550 ASSAY OF CK (CPK): CPT | Performed by: NURSE PRACTITIONER

## 2023-12-21 PROCEDURE — 84132 ASSAY OF SERUM POTASSIUM: CPT | Performed by: STUDENT IN AN ORGANIZED HEALTH CARE EDUCATION/TRAINING PROGRAM

## 2023-12-21 PROCEDURE — 72110 X-RAY EXAM L-2 SPINE 4/>VWS: CPT

## 2023-12-21 PROCEDURE — 80053 COMPREHEN METABOLIC PANEL: CPT | Performed by: INTERNAL MEDICINE

## 2023-12-21 PROCEDURE — 93971 EXTREMITY STUDY: CPT

## 2023-12-21 RX ORDER — SODIUM CHLORIDE AND POTASSIUM CHLORIDE 150; 900 MG/100ML; MG/100ML
125 INJECTION, SOLUTION INTRAVENOUS CONTINUOUS
Status: DISPENSED | OUTPATIENT
Start: 2023-12-21 | End: 2023-12-22

## 2023-12-21 RX ORDER — POTASSIUM CHLORIDE 750 MG/1
40 TABLET, FILM COATED, EXTENDED RELEASE ORAL EVERY 4 HOURS
Status: COMPLETED | OUTPATIENT
Start: 2023-12-21 | End: 2023-12-21

## 2023-12-21 RX ADMIN — METHOTREXATE 2.5 MG: 2.5 TABLET ORAL at 09:00

## 2023-12-21 RX ADMIN — CHLORTHALIDONE 25 MG: 25 TABLET ORAL at 09:01

## 2023-12-21 RX ADMIN — ACETAMINOPHEN 1000 MG: 500 TABLET ORAL at 15:01

## 2023-12-21 RX ADMIN — SENNOSIDES AND DOCUSATE SODIUM 2 TABLET: 50; 8.6 TABLET ORAL at 21:00

## 2023-12-21 RX ADMIN — TAMSULOSIN HYDROCHLORIDE 0.4 MG: 0.4 CAPSULE ORAL at 21:01

## 2023-12-21 RX ADMIN — LIDOCAINE 2 PATCH: 4 PATCH TOPICAL at 09:00

## 2023-12-21 RX ADMIN — Medication 1 MG: at 21:00

## 2023-12-21 RX ADMIN — SENNOSIDES AND DOCUSATE SODIUM 2 TABLET: 50; 8.6 TABLET ORAL at 09:00

## 2023-12-21 RX ADMIN — POTASSIUM CHLORIDE 40 MEQ: 750 TABLET, EXTENDED RELEASE ORAL at 09:21

## 2023-12-21 RX ADMIN — ACETAMINOPHEN 1000 MG: 500 TABLET ORAL at 21:00

## 2023-12-21 RX ADMIN — PREDNISONE 40 MG: 20 TABLET ORAL at 09:00

## 2023-12-21 RX ADMIN — Medication 10 ML: at 09:19

## 2023-12-21 RX ADMIN — Medication 10 ML: at 21:00

## 2023-12-21 RX ADMIN — POTASSIUM CHLORIDE 40 MEQ: 750 TABLET, EXTENDED RELEASE ORAL at 15:01

## 2023-12-21 RX ADMIN — ACETAMINOPHEN 1000 MG: 500 TABLET ORAL at 09:00

## 2023-12-21 NOTE — PLAN OF CARE
Problem: Adult Inpatient Plan of Care  Goal: Plan of Care Review  12/21/2023 0826 by Veronika Jiménez RN  Outcome: Ongoing, Progressing  12/20/2023 1913 by Veronika Jiménez, RN  Outcome: Ongoing, Progressing   Goal Outcome Evaluation:

## 2023-12-21 NOTE — THERAPY EVALUATION
Patient Name: Anthony Gallegos  : 1945    MRN: [id number]                              Today's Date: 2023       Admit Date: 2023    Visit Dx:     ICD-10-CM ICD-9-CM   1. Muscle pain  M79.10 729.1   2. Dizziness  R42 780.4   3. Immobility  Z74.09 799.89   4. Fall, initial encounter  W19.XXXA E888.9   5. Elevated CK  R74.8 790.5   6. Arthralgia, unspecified joint  M25.50 719.40     Patient Active Problem List   Diagnosis    Ankylosing spondylitis of cervical region    Recent unexplained weight loss    Abnormal serum lipase level    Abnormal serum level of amylase    Hypertension    Boil    Immobility    Fall from bed    Tetrahydrocannabinol (THC) use disorder, mild, abuse    Generalized pain        Grief    Urine retention    Constipation    DISH (disseminated idiopathic skeletal hyperostosis)    Generalized weakness     Past Medical History:   Diagnosis Date    Abscess of scrotum     Arthritis     AS (ankylosing spondylitis)     Hypertension     Tetrahydrocannabinol (THC) use disorder, mild, abuse 2023    Uveitis      Past Surgical History:   Procedure Laterality Date    COLONOSCOPY      ROTATOR CUFF REPAIR Right     TOTAL HIP ARTHROPLASTY Left 2014      General Information       Row Name 23 1642          Physical Therapy Time and Intention    Document Type evaluation  -DB     Mode of Treatment individual therapy;physical therapy  -DB       Row Name 23 1642          General Information    Patient Profile Reviewed yes  -DB     Prior Level of Function independent:;ADL's;driving;shopping  -DB     Existing Precautions/Restrictions fall  -DB     Barriers to Rehab none identified  -DB       Row Name 23 1642          Living Environment    People in Home alone  -DB       Row Name 23 1642          Home Main Entrance    Number of Stairs, Main Entrance none  -DB       Row Name 23 1642          Stairs Within Home, Primary    Number of Stairs, Within Home, Primary none   -DB       Row Name 12/21/23 1642          Cognition    Orientation Status (Cognition) oriented x 4  -DB       Row Name 12/21/23 1642          Safety Issues, Functional Mobility    Safety Issues Affecting Function (Mobility) sequencing abilities  -DB     Impairments Affecting Function (Mobility) balance;endurance/activity tolerance;strength  -DB               User Key  (r) = Recorded By, (t) = Taken By, (c) = Cosigned By      Initials Name Provider Type    DB Felipa Weathers PT Physical Therapist                   Mobility       Row Name 12/21/23 1642          Bed Mobility    Bed Mobility supine-sit  -DB     Supine-Sit Roberts (Bed Mobility) moderate assist (50% patient effort);verbal cues;nonverbal cues (demo/gesture)  -DB     Assistive Device (Bed Mobility) bed rails;head of bed elevated  -DB     Comment, (Bed Mobility) inc'd time  -DB       Row Name 12/21/23 1642          Sit-Stand Transfer    Sit-Stand Roberts (Transfers) moderate assist (50% patient effort);2 person assist;verbal cues;nonverbal cues (demo/gesture)  -DB     Assistive Device (Sit-Stand Transfers) walker, front-wheeled  -DB       Row Name 12/21/23 1642          Gait/Stairs (Locomotion)    Roberts Level (Gait) minimum assist (75% patient effort);2 person assist;verbal cues;nonverbal cues (demo/gesture)  -DB     Assistive Device (Gait) walker, front-wheeled  -DB     Deviations/Abnormal Patterns (Gait) ranjit decreased;stride length decreased  -DB     Bilateral Gait Deviations forward flexed posture;heel strike decreased  -DB               User Key  (r) = Recorded By, (t) = Taken By, (c) = Cosigned By      Initials Name Provider Type    DB Felipa Weathers PT Physical Therapist                   Obj/Interventions       Row Name 12/21/23 1700          Strength Comprehensive (MMT)    Comment, General Manual Muscle Testing (MMT) Assessment generalized weakness  -DB       Row Name 12/21/23 1700          Balance    Balance Assessment  sitting static balance;sitting dynamic balance;standing static balance;standing dynamic balance  -DB     Static Sitting Balance standby assist  -DB     Dynamic Sitting Balance standby assist  -DB     Position, Sitting Balance unsupported;sitting edge of bed  -DB     Static Standing Balance contact guard  -DB     Dynamic Standing Balance minimal assist;2-person assist  -DB     Position/Device Used, Standing Balance supported  -DB     Balance Interventions sitting;standing;sit to stand  -DB               User Key  (r) = Recorded By, (t) = Taken By, (c) = Cosigned By      Initials Name Provider Type    DB Felipa Weathers, PT Physical Therapist                   Goals/Plan       Row Name 12/21/23 1712          Bed Mobility Goal 1 (PT)    Activity/Assistive Device (Bed Mobility Goal 1, PT) bed mobility activities, all  -DB     Power Level/Cues Needed (Bed Mobility Goal 1, PT) supervision required  -DB     Time Frame (Bed Mobility Goal 1, PT) 1 week  -DB       Row Name 12/21/23 1712          Transfer Goal 1 (PT)    Activity/Assistive Device (Transfer Goal 1, PT) transfers, all  -DB     Power Level/Cues Needed (Transfer Goal 1, PT) supervision required  -DB     Time Frame (Transfer Goal 1, PT) 1 week  -DB       Row Name 12/21/23 1712          Gait Training Goal 1 (PT)    Activity/Assistive Device (Gait Training Goal 1, PT) gait (walking locomotion)  -DB     Power Level (Gait Training Goal 1, PT) supervision required  -DB     Time Frame (Gait Training Goal 1, PT) 1 week  -DB       Row Name 12/21/23 1712          Therapy Assessment/Plan (PT)    Planned Therapy Interventions (PT) balance training;bed mobility training;gait training;home exercise program;neuromuscular re-education;patient/family education;strengthening;ROM (range of motion);stair training;postural re-education;transfer training  -DB               User Key  (r) = Recorded By, (t) = Taken By, (c) = Cosigned By      Initials Name Provider  Type    DB Felipa Weathers, GERI Physical Therapist                   Clinical Impression       Row Name 12/21/23 1710          Pain    Pain Intervention(s) Ambulation/increased activity;Repositioned  -DB       Row Name 12/21/23 1710          Plan of Care Review    Plan of Care Reviewed With patient  -DB     Outcome Evaluation Patient is a 78 y.o. male admitted to [facility] for fall, immobility, and inc'd weakness on 12/20/2023. PMHx includes ankylosing spondylitis, HTN. Patient is (I) at baseline and lives alone. He denies VIRGIE or stairs and uses a cane at baseline. Today, patient performed bed mobility with modA, required modAx2 for transfers, and ambulated 5' with use of RW and Chuckie x2. Strength, balance, and activity tolerance deficits noted. Patient may benefit from skilled PT services acutely to address functional deficits as well as improve level of independence prior to discharge. Anticipate SNF upon DC.  -DB       Row Name 12/21/23 1710          Therapy Assessment/Plan (PT)    Rehab Potential (PT) good, to achieve stated therapy goals  -DB     Criteria for Skilled Interventions Met (PT) yes  -DB     Therapy Frequency (PT) 6 times/wk  -DB       Row Name 12/21/23 1710          Vital Signs    O2 Delivery Pre Treatment room air  -DB     O2 Delivery Intra Treatment room air  -DB     O2 Delivery Post Treatment room air  -DB     Pre Patient Position Supine  -DB     Intra Patient Position Standing  -DB     Post Patient Position Sitting  -DB       Row Name 12/21/23 1710          Positioning and Restraints    Pre-Treatment Position in bed  -DB     Post Treatment Position chair  -DB     In Chair sitting;notified nsg;call light within reach;encouraged to call for assist;exit alarm on  -DB               User Key  (r) = Recorded By, (t) = Taken By, (c) = Cosigned By      Initials Name Provider Type    DB Felipa Weathers, PT Physical Therapist                   Outcome Measures       Row Name 12/21/23 1713 12/21/23 0834        How much help from another person do you currently need...    Turning from your back to your side while in flat bed without using bedrails? 3  -DB 3  -BC    Moving from lying on back to sitting on the side of a flat bed without bedrails? 2  -DB 3  -BC    Moving to and from a bed to a chair (including a wheelchair)? 2  -DB 2  -BC    Standing up from a chair using your arms (e.g., wheelchair, bedside chair)? 2  -DB 2  -BC    Climbing 3-5 steps with a railing? 1  -DB 1  -BC    To walk in hospital room? 2  -DB 2  -BC    AM-PAC 6 Clicks Score (PT) 12  -DB 13  -BC    Highest Level of Mobility Goal 4 --> Transfer to chair/commode  -DB 4 --> Transfer to chair/commode  -BC      Row Name 12/21/23 1713          Functional Assessment    Outcome Measure Options AM-PAC 6 Clicks Basic Mobility (PT)  -DB               User Key  (r) = Recorded By, (t) = Taken By, (c) = Cosigned By      Initials Name Provider Type    DB Felipa Weathers, GERI Physical Therapist    Veronika Goff RN Registered Nurse                                 Physical Therapy Education       Title: PT OT SLP Therapies (Done)       Topic: Physical Therapy (Done)       Point: Mobility training (Done)       Learning Progress Summary             Patient Acceptance, E, VU by DB at 12/21/2023 1713                         Point: Home exercise program (Done)       Learning Progress Summary             Patient Acceptance, E, VU by DB at 12/21/2023 1713                         Point: Body mechanics (Done)       Learning Progress Summary             Patient Acceptance, E, VU by DB at 12/21/2023 1713                         Point: Precautions (Done)       Learning Progress Summary             Patient Acceptance, E, VU by DB at 12/21/2023 1713                                         User Key       Initials Effective Dates Name Provider Type Discipline    DB 06/16/21 -  Felipa Weatehrs, PT Physical Therapist PT                  PT Recommendation and Plan  Planned Therapy  Interventions (PT): balance training, bed mobility training, gait training, home exercise program, neuromuscular re-education, patient/family education, strengthening, ROM (range of motion), stair training, postural re-education, transfer training  Plan of Care Reviewed With: patient  Outcome Evaluation: Patient is a 78 y.o. male admitted to [facility] for fall, immobility, and inc'd weakness on 12/20/2023. PMHx includes ankylosing spondylitis, HTN. Patient is (I) at baseline and lives alone. He denies VIRGIE or stairs and uses a cane at baseline. Today, patient performed bed mobility with modA, required modAx2 for transfers, and ambulated 5' with use of RW and Chuckie x2. Strength, balance, and activity tolerance deficits noted. Patient may benefit from skilled PT services acutely to address functional deficits as well as improve level of independence prior to discharge. Anticipate SNF upon DC.     Time Calculation:         PT Charges       Row Name 12/21/23 1717             Time Calculation    Start Time 1526  -DB      Stop Time 1544  -DB      Time Calculation (min) 18 min  -DB      PT Received On 12/21/23  -DB      PT - Next Appointment 12/22/23  -DB      PT Goal Re-Cert Due Date 12/28/23  -DB         Time Calculation- PT    Total Timed Code Minutes- PT 0 minute(s)  -DB                User Key  (r) = Recorded By, (t) = Taken By, (c) = Cosigned By      Initials Name Provider Type    DB Felipa Weathers, GERI Physical Therapist                  Therapy Charges for Today       Code Description Service Date Service Provider Modifiers Qty    [id number] HC PT EVAL MOD COMPLEXITY 4 12/21/2023 Felipa Weathers, PT GP 1            PT G-Codes  Outcome Measure Options: AM-PAC 6 Clicks Basic Mobility (PT)  AM-PAC 6 Clicks Score (PT): 12  PT Discharge Summary  Anticipated Discharge Disposition (PT): skilled nursing facility    Felipa Weathers PT  12/21/2023

## 2023-12-21 NOTE — CASE MANAGEMENT/SOCIAL WORK
Discharge Planning Assessment  [facility]     Patient Name: Anthony Gallegos  MRN: [id number]  Today's Date: 12/21/2023    Admit Date: 12/20/2023    Plan: Pending referral to [facility]; will need pre-cert   Discharge Needs Assessment       Row Name 12/21/23 1515       Living Environment    People in Home alone    Current Living Arrangements apartment    Potentially Unsafe Housing Conditions none    Primary Care Provided by self    Provides Primary Care For no one    Family Caregiver if Needed child(winston), adult    Quality of Family Relationships helpful;involved;supportive       Resource/Environmental Concerns    Resource/Environmental Concerns none       Transition Planning    Patient/Family Anticipates Transition to inpatient rehabilitation facility    Transportation Anticipated family or friend will provide       Discharge Needs Assessment    Readmission Within the Last 30 Days no previous admission in last 30 days    Current Outpatient/Agency/Support Group skilled nursing facility    Equipment Currently Used at Home cane, straight    Concerns to be Addressed care coordination/care conferences    Equipment Needed After Discharge none    Outpatient/Agency/Support Group Needs skilled nursing facility    Discharge Facility/Level of Care Needs nursing facility, skilled                   Discharge Plan       Row Name 12/21/23 1516       Plan    Plan Pending referral to [facility]; will need pre-cert    Plan Comments CCP met with patient at bedside. CCP role explained and discharge planning discussed. Face sheet verified and TATE noted. Patient's PCP is Dr. Macedo. Patient lives alone. Patient uses a cane for mobiliy. Patient has not used home health or been to SNF. Patient unsure of d/c needs. Patient states his daughter, Whit handles everything for him. Patient gave CCP permission to discuss discharge plans with Whit. CCP spoke with Whit; patient's daughter reports she is hoping he can do short term  rehab and then transition to assisted living. Patient's daughter has already been in contact with [facility] assisted living and was going to look into a few other places. Patient's daughter requested referral to [facility] for rehab. [facility] encouraged patient's daughter to review other SNF options incase [facility] can not accept. Patient's daughter to research other SNF options. CCP placed referral in [facility]. CCP will follow up with patient's daughter tomorrow for back up SNF options. Will need pre-cert. Stella DRUMMOND                  Continued Care and Services - Admitted Since 12/20/2023    Coordination has not been started for this encounter.          Demographic Summary       Row Name 12/21/23 1515       General Information    Admission Type observation    Arrived From emergency department    Required Notices Provided Observation Status Notice    Referral Source admission list    Reason for Consult discharge planning    Preferred Language English                   Functional Status       Row Name 12/21/23 1515       Functional Status    Usual Activity Tolerance good    Current Activity Tolerance fair       Functional Status, IADL    Medications assistive equipment    Meal Preparation assistive equipment    Housekeeping assistive equipment    Laundry assistive equipment    Shopping assistive equipment       Mental Status    General Appearance WDL WDL                   Psychosocial    No documentation.                  Abuse/Neglect    No documentation.                  Legal    No documentation.                  Substance Abuse    No documentation.                  Patient Forms    No documentation.                     HODA Jimenez

## 2023-12-21 NOTE — PLAN OF CARE
Problem: Adult Inpatient Plan of Care  Goal: Absence of Hospital-Acquired Illness or Injury  Intervention: Identify and Manage Fall Risk  Recent Flowsheet Documentation  Taken 12/20/2023 1800 by Veronika Jiménez RN  Safety Promotion/Fall Prevention:   safety round/check completed   fall prevention program maintained  Intervention: Prevent Skin Injury  Recent Flowsheet Documentation  Taken 12/20/2023 1800 by Veronika Jiménez RN  Body Position: position changed independently  Intervention: Prevent and Manage VTE (Venous Thromboembolism) Risk  Recent Flowsheet Documentation  Taken 12/20/2023 1800 by Veronika Jiménez RN  Activity Management: ambulated in room  VTE Prevention/Management:   compression stockings on   bilateral  Intervention: Prevent Infection  Recent Flowsheet Documentation  Taken 12/20/2023 1800 by Veronika Jiménez RN  Infection Prevention: hand hygiene promoted  Goal: Readiness for Transition of Care  Intervention: Mutually Develop Transition Plan  Recent Flowsheet Documentation  Taken 12/20/2023 1756 by Veronika Jiménez RN  Transportation Anticipated: family or friend will provide  Patient/Family Anticipated Services at Transition:   Patient/Family Anticipates Transition to:   home with help/services   inpatient rehabilitation facility  Taken 12/20/2023 1751 by Veronika Jiménez RN  Equipment Currently Used at Home: cane, straight     Problem: Fall Injury Risk  Goal: Absence of Fall and Fall-Related Injury  Intervention: Promote Injury-Free Environment  Recent Flowsheet Documentation  Taken 12/20/2023 1800 by Veronika Jiménez RN  Safety Promotion/Fall Prevention:   safety round/check completed   fall prevention program maintained   Goal Outcome Evaluation:

## 2023-12-21 NOTE — PLAN OF CARE
Goal Outcome Evaluation:  Plan of Care Reviewed With: patient         Patient is a 78 y.o. male admitted to [facility] for fall, immobility, and inc'd weakness on 12/20/2023. PMHx includes ankylosing spondylitis, HTN. Patient is (I) at baseline and lives alone. He denies VIRGIE or stairs and uses a cane at baseline. Today, patient performed bed mobility with modA, required modAx2 for transfers, and ambulated 5' with use of RW and Chuckie x2. Strength, balance, and activity tolerance deficits noted. Patient may benefit from skilled PT services acutely to address functional deficits as well as improve level of independence prior to discharge. Anticipate SNF upon DC.        Anticipated Discharge Disposition (PT): skilled nursing facility

## 2023-12-21 NOTE — CONSULTS
"Nutrition Services    Patient Name:  Anthony Gallegos  YOB: 1945  MRN: [id number]  Admit Date:  12/20/2023    Assessment Date:  12/21/23    Summary: Consult for unintentional wt loss    Pt is a 78 y.o. male adm for immobility. H/o THC use disorder (mild abuse), ankylosing spondylitis,  and grief. Nutrition assessment completed. Visited pt at bedside. He endorses good appetite and that he is as \"hungry as a hog\". Assisted pt with sitting up to eat his lunch tray. Has noticed wt loss but doesn't know how much or over what time frame. Wt hx reviewed, 41 lb wt loss x 5 yrs. Agreeable to Ensure Compact.   Meds: IVFs @ 125 mL/hr, pericolace     REC:  Ensure Compact vanilla BID B/D for additional calories/protein and to support adequate PO intake   Will CTM PO/ONS intake   Will f/up for NFPE     RD to follow per protocol.    CLINICAL NUTRITION ASSESSMENT      Reason for Assessment Physician Consult, Unintentional Weight Loss      Diagnosis/Problem   Immobility    Medical/Surgical History Past Medical History:   Diagnosis Date    Abscess of scrotum     Arthritis     AS (ankylosing spondylitis)     Hypertension     Tetrahydrocannabinol (THC) use disorder, mild, abuse 12/20/2023    Uveitis        Past Surgical History:   Procedure Laterality Date    COLONOSCOPY      ROTATOR CUFF REPAIR Right     TOTAL HIP ARTHROPLASTY Left 2014        Anthropometrics        Current Height  Current Weight  BMI kg/m2 Height: 190.5 cm (75\")  Weight: 81.9 kg (180 lb 8 oz) (12/20/23 1005)  Body mass index is 22.56 kg/m².   Adjusted BMI (if applicable)    BMI Category Normal/Healthy (18.4 - 24.9)   Ideal Body Weight (IBW) 84.5 kg    Usual Body Weight (UBW) 220 lbs    Weight Trend Loss 41 lbs wt loss x 5 yrs    Weight History Wt Readings from Last 30 Encounters:   12/20/23 1005 81.9 kg (180 lb 8 oz)   03/27/18 1439 100 kg (221 lb)   03/22/18 1613 101 kg (222 lb 12.8 oz)   01/23/18 1414 101 kg (223 lb)   12/26/17 1111 99.8 kg " "(220 lb)   10/19/17 1239 95.3 kg (210 lb)   08/30/17 1459 91.3 kg (201 lb 3.2 oz)   08/29/17 1221 93 kg (205 lb)   08/10/17 1517 88 kg (194 lb)   07/14/17 1041 85.9 kg (189 lb 6.4 oz)   06/29/17 1318 84.5 kg (186 lb 3.2 oz)   06/21/17 1922 90.7 kg (200 lb)        Estimated Requirements         Weight used  81.9 kg     Calories  1662-6341 (25 kcal/kg, 30 kcal/kg)    Protein  82-98 (1.0 - 1.2 gm/kg)    Fluid  1 mL/kcal      Labs       Pertinent Labs    Results from last 7 days   Lab Units 12/21/23  0732 12/20/23  1029   SODIUM mmol/L 137 138   POTASSIUM mmol/L 3.5 3.8   CHLORIDE mmol/L 103 98   CO2 mmol/L 25.9 28.0   BUN mg/dL 13 18   CREATININE mg/dL 0.80 1.00   CALCIUM mg/dL 7.9* 9.1   BILIRUBIN mg/dL 0.3 0.4   ALK PHOS U/L 35* 47   ALT (SGPT) U/L 11 12   AST (SGOT) U/L 45* 35   GLUCOSE mg/dL 87 99     Results from last 7 days   Lab Units 12/21/23  0732 12/20/23  1029   MAGNESIUM mg/dL 2.1 1.7   HEMOGLOBIN g/dL 11.0* 13.0   HEMATOCRIT % 33.3* 39.3   WBC 10*3/mm3 3.74 4.25   ALBUMIN g/dL 2.9* 3.8     Results from last 7 days   Lab Units 12/21/23  0732 12/20/23  1029   INR   --  1.20*   PLATELETS 10*3/mm3 149 171     No results found for: \"COVID19\"  No results found for: \"HGBA1C\"       Medications           Scheduled Medications acetaminophen, 1,000 mg, Oral, TID  atenolol, 50 mg, Oral, Q24H   And  chlorthalidone, 25 mg, Oral, Q24H  Lidocaine, 2 patch, Transdermal, Q24H  methotrexate, 2.5 mg, Oral, Once per day on Wed Thu  potassium chloride ER, 40 mEq, Oral, Q4H  predniSONE, 40 mg, Oral, Daily With Breakfast  senna-docusate sodium, 2 tablet, Oral, BID  sodium chloride, 10 mL, Intravenous, Q12H  tamsulosin, 0.4 mg, Oral, Nightly       Infusions sodium chloride 0.9 % with KCl 20 mEq, 125 mL/hr, Last Rate: 125 mL/hr (12/20/23 1941)       PRN Medications   acetaminophen **OR** acetaminophen **OR** acetaminophen    senna-docusate sodium **AND** polyethylene glycol **AND** bisacodyl **AND** bisacodyl    calcium carbonate   "  Calcium Replacement - Follow Nurse / BPA Driven Protocol    Magnesium Standard Dose Replacement - Follow Nurse / BPA Driven Protocol    melatonin    muscle rub    ondansetron ODT **OR** ondansetron    Phosphorus Replacement - Follow Nurse / BPA Driven Protocol    Potassium Replacement - Follow Nurse / BPA Driven Protocol    [COMPLETED] Insert Peripheral IV **AND** sodium chloride    sodium chloride    sodium chloride    traMADol     Physical Findings          General Findings alert, room air   Oral/Mouth Cavity dental appliance   Edema  lower extremity , 3+ (moderate)   Gastrointestinal last bowel movement: 12/18   Skin  skin intact   Tubes/Drains/Lines none   NFPE Unable to perform due to: pt eating lunch will f/up for NFPE   --  Current Nutrition Orders & Evaluation of Intake       Oral Nutrition     Food Allergies NKFA   Current PO Diet Diet: Cardiac Diets; Healthy Heart (2-3 Na+); Texture: Regular Texture (IDDSI 7); Fluid Consistency: Thin (IDDSI 0)   Supplement n/a   PO Evaluation     % PO Intake Not yet documented     Factors Affecting Intake: emotional status, pain issues, weakness   --  PES STATEMENT / NUTRITION DIAGNOSIS      Nutrition Dx Problem  Problem: Unintentional Weight Loss  Etiology: Medical Diagnosis - immobility, , grief and Factors Affecting Nutrition -  emotional status, weakness, pain issues     Signs/Symptoms: Unintended Weight Change     NUTRITION INTERVENTION / PLAN OF CARE      Intervention Goal(s) Maintain nutrition status, Establish goals of care, Reduce/improve symptoms, Meet estimated needs, Disease management/therapy, Establish PO intake, Tolerate PO , Increase intake, Accepts oral nutrition supplement, Maintain weight, No significant weight loss, and PO intake goal %: 75%         RD Intervention/Action Interview for preferences, Encourage intake, Continue to monitor, Care plan reviewed, and Recommend/order: ONS    --      Prescription/Orders:       PO Diet       Supplements  Ensure Compact vanilla BID B/D      Enteral Nutrition       Parenteral Nutrition    New Prescription Ordered? Yes   --      Monitor/Evaluation Per protocol   Discharge Plan/Needs Pending clinical course   --    RD to follow per protocol.      Electronically signed by:  Rizwana Lugo RD  12/21/23 11:54 EST

## 2023-12-21 NOTE — DISCHARGE PLACEMENT REQUEST
"Anthony Gallegos (78 y.o. Male)       Date of Birth   1945    Social Security Number       Address   [address] [address]    Home Phone   [phone]    MRN   [id number]       [facility]    Marital Status                               Admission Date   12/20/23    Admission Type   Emergency    Admitting Provider   Gail Anna MD    Attending Provider   Tom Abel MD    Department, Room/Bed   [address], P389/1       Discharge Date       Discharge Disposition       Discharge Destination                                 Attending Provider: Tom Abel MD    Allergies: No Known Allergies    Isolation: None   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)   Code Status: CPR    Ht: 190.5 cm (75\")   Wt: 81.9 kg (180 lb 8 oz)    Admission Cmt: None   Principal Problem: Generalized weakness [R53.1]                   Active Insurance as of 12/20/2023       Primary Coverage       Payor Plan Insurance Group Employer/Plan Group    AETNA MEDICARE REPLACEMENT AETNA MED ADV POS [id number]       Payor Plan Address Payor Plan Phone Number Payor Plan Fax Number Effective Dates    [address] [phone]  12/1/2023 - None Entered    [facility] [address]         Subscriber Name Subscriber Birth Date Member ID       ANTHONY GALLEGOS 1945 [id number]                     Emergency Contacts        (Rel.) Home Phone Work Phone Mobile Phone    NATHALIE DON (Daughter) [phone] -- --    MAGY GALLEGOS [phone] -- --                "

## 2023-12-21 NOTE — CONSULTS
"  Access center consult.     Met with patient in room #B299. Introduced self and role. Patient agreed to be evaluated. Primary RN in room.    Patient is a 78 y.o.W/B/M. He is alert and oriented x3. Patient lives at home alone. Shinto: Gnosticist. Children: 1 son, 1 daughter. 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. Hobbies: reading. Education: college. Legal: na. : Whit Dove/daughter [phone]. : na. Support system: family, friends. History or violence/trauma/abuse: denies. Patient feels safe in his home environment.    Access consulted for depression. Per MD note anxiety, depression, fall, weight loss, grief(spouse  May 2023). Per notes patient moved back to [place] from [place] after death of spouse to be closer to his children. 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 Patient presented to the ED 23 with c/o generalized weakness, body wide pain, fall(denied hitting head). Per notes patient at home fell off bed while attempting to  cell phone. Patient called family, was unable to get self up off the floor. BAL normal. UDS +THC, opiates. Past medical history AS, HTN, arthritis, THC use disorder (mild abuse), abscess of scrotum, chronic pain.Per notes use of THC secondary to chronic pain. Patient denies any past inpatient psychiatric care. He denies any past or current mental health providers. Patient denies SI/HI/A/V/H. Denies wish to be dead. Sleep/Appetite: no recent changes. Depression: \"-1\"/10. Anxiety: \"-1\"/10. Current stressors: physical decline. Patient expresses desire \"to get my body back together.\"     Throughout encounter patient discussed work history and travel, rambling at times,requiring redirection. Patient talkative pleasant mood.  He appeared brighter discussing work history performed and various travel across the country associated with work. Patient discussed meeting spouse in college and how their friendship " developed into a relationship. Patient denies ETOH or illicit drug use. Offered outpatient psychiatry resources including grief counseling, pt. declined. He reports having good family/friends support system. Access will follow.

## 2023-12-21 NOTE — PLAN OF CARE
Goal Outcome Evaluation:  Plan of Care Reviewed With: patient        Progress: no change  Outcome Evaluation: Pt alert and oriented x4. VSS on room air. Pt has rested well in bed throughout shift. Tylenol given for pain to L foot. X Ray done. IVF infusing as ordered. Pt voices no needs at this time. Call light and personal items within reach.

## 2023-12-21 NOTE — PROGRESS NOTES
Name: Anthony Gallegos ADMIT: 2023   : 1945  PCP: Marco Macedo MD    MRN: [id number] LOS: 0 days   AGE/SEX: 78 y.o. male  ROOM: [facility]/     Subjective   Subjective   Resting in bed.  No family at bedside.  He denies any current complaints including chest pain, dyspnea, cough, fever or chills.  Denies any nausea, vomiting or abdominal pain.  States he has not had issues with urinating in the past.  States that he has not yet established care with rheumatology here and that he is currently on methotrexate.     Objective   Objective   Vital Signs  Temp:  [97.7 °F (36.5 °C)-98.8 °F (37.1 °C)] 97.7 °F (36.5 °C)  Heart Rate:  [56-82] 58  Resp:  [14-18] 14  BP: (102-126)/(54-86) 104/55  SpO2:  [96 %-100 %] 96 %  on   ;   Device (Oxygen Therapy): room air  Body mass index is 22.56 kg/m².    Physical Exam  Vitals and nursing note reviewed.   Constitutional:       Appearance: He is ill-appearing (Chronically).   Cardiovascular:      Rate and Rhythm: Normal rate and regular rhythm.      Pulses: Normal pulses.   Pulmonary:      Effort: Pulmonary effort is normal. No respiratory distress.      Comments: Diminished on room air  Abdominal:      General: Bowel sounds are normal. There is no distension.      Palpations: Abdomen is soft.      Tenderness: There is no abdominal tenderness.   Genitourinary:     Comments: Mariscal anchored  Musculoskeletal:         General: No swelling or tenderness. Normal range of motion.   Skin:     General: Skin is warm and dry.      Findings: No bruising.   Neurological:      General: No focal deficit present.      Mental Status: He is alert and oriented to person, place, and time.      Sensory: No sensory deficit.      Motor: Weakness present.      Coordination: Coordination normal.   Psychiatric:         Mood and Affect: Mood normal.         Behavior: Behavior normal.      Comments: Seems a little guarded/flat       Results Review:       I reviewed the patient's new clinical  "results.  Results from last 7 days   Lab Units 12/21/23  0732 12/20/23  1029   WBC 10*3/mm3 3.74 4.25   HEMOGLOBIN g/dL 11.0* 13.0   PLATELETS 10*3/mm3 149 171     Results from last 7 days   Lab Units 12/21/23  0732 12/20/23  1029   SODIUM mmol/L 137 138   POTASSIUM mmol/L 3.5 3.8   CHLORIDE mmol/L 103 98   CO2 mmol/L 25.9 28.0   BUN mg/dL 13 18   CREATININE mg/dL 0.80 1.00   GLUCOSE mg/dL 87 99   Estimated Creatinine Clearance: 86.7 mL/min (by C-G formula based on SCr of 0.8 mg/dL).  Results from last 7 days   Lab Units 12/21/23  0732 12/20/23  1029   ALBUMIN g/dL 2.9* 3.8   BILIRUBIN mg/dL 0.3 0.4   ALK PHOS U/L 35* 47   AST (SGOT) U/L 45* 35   ALT (SGPT) U/L 11 12     Results from last 7 days   Lab Units 12/21/23  0732 12/20/23  1029   CALCIUM mg/dL 7.9* 9.1   ALBUMIN g/dL 2.9* 3.8   MAGNESIUM mg/dL 2.1 1.7     Results from last 7 days   Lab Units 12/20/23  2304 12/20/23 2003 12/20/23  1807   LACTATE mmol/L 1.8 2.4* 2.5*     No results found for: \"HGBA1C\", \"POCGLU\"    acetaminophen, 1,000 mg, Oral, TID  atenolol, 50 mg, Oral, Q24H   And  chlorthalidone, 25 mg, Oral, Q24H  Lidocaine, 2 patch, Transdermal, Q24H  methotrexate, 2.5 mg, Oral, Once per day on Wed Thu  potassium chloride ER, 40 mEq, Oral, Q4H  predniSONE, 40 mg, Oral, Daily With Breakfast  senna-docusate sodium, 2 tablet, Oral, BID  sodium chloride, 10 mL, Intravenous, Q12H  tamsulosin, 0.4 mg, Oral, Nightly      sodium chloride 0.9 % with KCl 20 mEq, 125 mL/hr, Last Rate: 125 mL/hr (12/20/23 1941)    Diet: Cardiac Diets; Healthy Heart (2-3 Na+); Texture: Regular Texture (IDDSI 7); Fluid Consistency: Thin (IDDSI 0)       Assessment/Plan     Active Hospital Problems    Diagnosis  POA    **Generalized weakness [R53.1]  Yes    Immobility [Z74.09]  Yes    Fall from bed [W06.XXXA]  Yes    Tetrahydrocannabinol (THC) use disorder, mild, abuse [F12.10]  Yes    Generalized pain [R52]  Yes     [Z63.4]  Yes    Grief [F43.21]  Yes    Urine retention [R33.9] "  Yes    Constipation [K59.00]  Yes    DISH (disseminated idiopathic skeletal hyperostosis) [M48.10]  Yes    Hypertension [I10]  Yes    Ankylosing spondylitis of cervical region [M45.2]  Yes      Resolved Hospital Problems   No resolved problems to display.     Mr. Gallegos is a 78 y.o. male that presented to the hospital after a fall and inability to ambulate from home.  He recently moved to [place] from [place] to be closer to his children after the recent death of his wife a few months back.  He was followed by rheumatologist there and had been on Remicade as well as temporal in the past for ankylosing spondylitis.  He is currently on methotrexate.  He was noted to be diffusely weak with generalized pain and admitted to the hospital for further evaluation.    Generalized weakness  Immobility  Fall from bed  -Multifactorial secondary to age and chronic comorbidities.  -PT/OT have been consulted.  Likely going to need some sort of rehabilitation.    Elevated CK  Lactic acidosis  -No signs of secondary infection.  UA and chest x-ray normal.  -Suspect he is clinically dry and currently on IV fluids.  -Continuing to monitor CMP and CK.    Generalized pain  Disseminated idiopathic skeletal hyperostosis  Ankylosing spondylitis  -X-ray of lumbar spine with diffuse ankylosis without any obvious injury or fracture.  -Patient currently denying any pain to me at this time.  Will hold off on CT unless symptoms change.  -Home methotrexate resumed.  Oral prednisone started.  -Need close outpatient follow-up with rheumatology.  -Will add agents as needed for pain.    Urinary retention  Constipation  -Mariscal catheter in place.  Flomax added.  -Will attempt voiding trial when more mobile.  -Did have moderate stool burden on imaging.  Likely contributing to some of the urinary retention.  Will continue bowel regimen.     Grief  THC use  -Access center was consulted and following.    Hypertension  -Atenolol continues.  -Will  hold chlorthalidone.  -BP overall stable.     Discussed with patient and Dr. Abel.    VTE Prophylaxis - SCDs  Code Status - Full code  Disposition - Anticipate discharge TBD- likely needs placement.       NOHEMI Weiss  [facility]  12/21/23  14:25 EST

## 2023-12-22 LAB
ALBUMIN SERPL-MCNC: 2.9 G/DL (ref 3.5–5.2)
ALBUMIN/GLOB SERPL: 0.9 G/DL
ALP SERPL-CCNC: 34 U/L (ref 39–117)
ALT SERPL W P-5'-P-CCNC: 18 U/L (ref 1–41)
ANION GAP SERPL CALCULATED.3IONS-SCNC: 8.3 MMOL/L (ref 5–15)
AST SERPL-CCNC: 46 U/L (ref 1–40)
BASOPHILS # BLD AUTO: 0.02 10*3/MM3 (ref 0–0.2)
BASOPHILS NFR BLD AUTO: 0.5 % (ref 0–1.5)
BILIRUB SERPL-MCNC: 0.3 MG/DL (ref 0–1.2)
BUN SERPL-MCNC: 15 MG/DL (ref 8–23)
BUN/CREAT SERPL: 24.2 (ref 7–25)
CALCIUM SPEC-SCNC: 8 MG/DL (ref 8.6–10.5)
CHLORIDE SERPL-SCNC: 106 MMOL/L (ref 98–107)
CK SERPL-CCNC: 844 U/L (ref 20–200)
CO2 SERPL-SCNC: 24.7 MMOL/L (ref 22–29)
CREAT SERPL-MCNC: 0.62 MG/DL (ref 0.76–1.27)
DEPRECATED RDW RBC AUTO: 48.8 FL (ref 37–54)
EGFRCR SERPLBLD CKD-EPI 2021: 97.2 ML/MIN/1.73
EOSINOPHIL # BLD AUTO: 0 10*3/MM3 (ref 0–0.4)
EOSINOPHIL NFR BLD AUTO: 0 % (ref 0.3–6.2)
ERYTHROCYTE [DISTWIDTH] IN BLOOD BY AUTOMATED COUNT: 14.4 % (ref 12.3–15.4)
GLOBULIN UR ELPH-MCNC: 3.2 GM/DL
GLUCOSE SERPL-MCNC: 98 MG/DL (ref 65–99)
HCT VFR BLD AUTO: 35 % (ref 37.5–51)
HGB BLD-MCNC: 11.6 G/DL (ref 13–17.7)
IMM GRANULOCYTES # BLD AUTO: 0.01 10*3/MM3 (ref 0–0.05)
IMM GRANULOCYTES NFR BLD AUTO: 0.3 % (ref 0–0.5)
LYMPHOCYTES # BLD AUTO: 1.17 10*3/MM3 (ref 0.7–3.1)
LYMPHOCYTES NFR BLD AUTO: 29.9 % (ref 19.6–45.3)
MCH RBC QN AUTO: 31.3 PG (ref 26.6–33)
MCHC RBC AUTO-ENTMCNC: 33.1 G/DL (ref 31.5–35.7)
MCV RBC AUTO: 94.3 FL (ref 79–97)
MONOCYTES # BLD AUTO: 0.46 10*3/MM3 (ref 0.1–0.9)
MONOCYTES NFR BLD AUTO: 11.8 % (ref 5–12)
NEUTROPHILS NFR BLD AUTO: 2.25 10*3/MM3 (ref 1.7–7)
NEUTROPHILS NFR BLD AUTO: 57.5 % (ref 42.7–76)
NRBC BLD AUTO-RTO: 0 /100 WBC (ref 0–0.2)
PLATELET # BLD AUTO: 152 10*3/MM3 (ref 140–450)
PMV BLD AUTO: 10.2 FL (ref 6–12)
POTASSIUM SERPL-SCNC: 4.1 MMOL/L (ref 3.5–5.2)
PROT SERPL-MCNC: 6.1 G/DL (ref 6–8.5)
RBC # BLD AUTO: 3.71 10*6/MM3 (ref 4.14–5.8)
SODIUM SERPL-SCNC: 139 MMOL/L (ref 136–145)
WBC NRBC COR # BLD AUTO: 3.91 10*3/MM3 (ref 3.4–10.8)

## 2023-12-22 PROCEDURE — 85025 COMPLETE CBC W/AUTO DIFF WBC: CPT | Performed by: INTERNAL MEDICINE

## 2023-12-22 PROCEDURE — 80053 COMPREHEN METABOLIC PANEL: CPT | Performed by: INTERNAL MEDICINE

## 2023-12-22 PROCEDURE — 63710000001 PREDNISONE PER 1 MG: Performed by: INTERNAL MEDICINE

## 2023-12-22 PROCEDURE — 82550 ASSAY OF CK (CPK): CPT | Performed by: NURSE PRACTITIONER

## 2023-12-22 PROCEDURE — 97110 THERAPEUTIC EXERCISES: CPT

## 2023-12-22 PROCEDURE — 97166 OT EVAL MOD COMPLEX 45 MIN: CPT

## 2023-12-22 RX ADMIN — TAMSULOSIN HYDROCHLORIDE 0.4 MG: 0.4 CAPSULE ORAL at 21:28

## 2023-12-22 RX ADMIN — SENNOSIDES AND DOCUSATE SODIUM 2 TABLET: 50; 8.6 TABLET ORAL at 21:28

## 2023-12-22 RX ADMIN — ACETAMINOPHEN 1000 MG: 500 TABLET ORAL at 16:01

## 2023-12-22 RX ADMIN — Medication 10 ML: at 21:28

## 2023-12-22 RX ADMIN — SENNOSIDES AND DOCUSATE SODIUM 2 TABLET: 50; 8.6 TABLET ORAL at 10:00

## 2023-12-22 RX ADMIN — PREDNISONE 40 MG: 20 TABLET ORAL at 09:59

## 2023-12-22 RX ADMIN — ATENOLOL 50 MG: 50 TABLET ORAL at 10:00

## 2023-12-22 RX ADMIN — ACETAMINOPHEN 1000 MG: 500 TABLET ORAL at 09:59

## 2023-12-22 RX ADMIN — ACETAMINOPHEN 1000 MG: 500 TABLET ORAL at 21:28

## 2023-12-22 RX ADMIN — Medication 10 ML: at 10:01

## 2023-12-22 NOTE — THERAPY TREATMENT NOTE
Patient Name: Anthony Gallegos  : 1945    MRN: [id number]                              Today's Date: 2023       Admit Date: 2023    Visit Dx:     ICD-10-CM ICD-9-CM   1. Muscle pain  M79.10 729.1   2. Dizziness  R42 780.4   3. Immobility  Z74.09 799.89   4. Fall, initial encounter  W19.XXXA E888.9   5. Elevated CK  R74.8 790.5   6. Arthralgia, unspecified joint  M25.50 719.40     Patient Active Problem List   Diagnosis    Ankylosing spondylitis of cervical region    Recent unexplained weight loss    Abnormal serum lipase level    Abnormal serum level of amylase    Hypertension    Boil    Immobility    Fall from bed    Tetrahydrocannabinol (THC) use disorder, mild, abuse    Generalized pain        Grief    Urine retention    Constipation    DISH (disseminated idiopathic skeletal hyperostosis)    Generalized weakness     Past Medical History:   Diagnosis Date    Abscess of scrotum     Arthritis     AS (ankylosing spondylitis)     Hypertension     Tetrahydrocannabinol (THC) use disorder, mild, abuse 2023    Uveitis      Past Surgical History:   Procedure Laterality Date    COLONOSCOPY      ROTATOR CUFF REPAIR Right     TOTAL HIP ARTHROPLASTY Left 2014      General Information       Row Name 23 165          Physical Therapy Time and Intention    Document Type therapy note (daily note)  -     Mode of Treatment physical therapy  -       Row Name 23 165          General Information    Patient Profile Reviewed yes  -     Existing Precautions/Restrictions fall  -       Row Name 23 165          Living Environment    People in Home alone  -       Row Name 23          Cognition    Orientation Status (Cognition) oriented x 4  slow to respond at times  -       Row Name 23 165          Safety Issues, Functional Mobility    Safety Issues Affecting Function (Mobility) insight into deficits/self-awareness;judgment;positioning of assistive  device;problem-solving;safety precaution awareness;sequencing abilities  -     Impairments Affecting Function (Mobility) balance;coordination;endurance/activity tolerance;range of motion (ROM);strength  -               User Key  (r) = Recorded By, (t) = Taken By, (c) = Cosigned By      Initials Name Provider Type    Rosa Asencio PTA Physical Therapist Assistant                   Mobility       Row Name 12/22/23 1652          Bed Mobility    Sit-Supine [place] (Bed Mobility) minimum assist (75% patient effort);moderate assist (50% patient effort)  -     Assistive Device (Bed Mobility) bed rails  -     Comment, (Bed Mobility) in chair upon arrival  -       Row Name 12/22/23 1652          Sit-Stand Transfer    Sit-Stand [place] (Transfers) 2 person assist;moderate assist (50% patient effort);verbal cues;nonverbal cues (demo/gesture)  -     Assistive Device (Sit-Stand Transfers) walker, front-wheeled  -     Comment, (Sit-Stand Transfer) initial heavy post lean  -       Row Name 12/22/23 1652          Gait/Stairs (Locomotion)    [place] Level (Gait) 2 person assist;minimum assist (75% patient effort);verbal cues;nonverbal cues (demo/gesture)  -     Assistive Device (Gait) walker, front-wheeled  -     Distance in Feet (Gait) 20  -     Deviations/Abnormal Patterns (Gait) ranjit decreased;festinating/shuffling;base of support, narrow;stride length decreased  -     Bilateral Gait Deviations forward flexed posture  -     Comment, (Gait/Stairs) some assist to guide rwx, multiple cues for same task , incr ABAD for safety and improved step length  -               User Key  (r) = Recorded By, (t) = Taken By, (c) = Cosigned By      Initials Name Provider Type    Rosa Asencio PTA Physical Therapist Assistant                   Obj/Interventions       Row Name 12/22/23 1654          Motor Skills    Therapeutic Exercise --  LAQs, standing knee ext before amb  -                User Key  (r) = Recorded By, (t) = Taken By, (c) = Cosigned By      Initials Name Provider Type    Rosa Asencio PTA Physical Therapist Assistant                   Goals/Plan    No documentation.                  Clinical Impression       Row Name 12/22/23 1656          Pain    Pretreatment Pain Rating 0/10 - no pain  -AMELIA     Posttreatment Pain Rating 0/10 - no pain  -AMELIA       Row Name 12/22/23 1656          Plan of Care Review    Plan of Care Reviewed With patient  -AMELIA     Progress improving  -     Outcome Evaluation Pt agreed to therapy, pt up in chair and required MAX2 STS from low chair, post lean initially, pt alexei amb ~20ft w/rwx , min A 2, cues for sequencing , foot placement, plans SNU at [place] as pt is below baseline and high falls risk, no C/O L ankle pain this session, wore shoes for this amb  -       Row Name 12/22/23 1656          Therapy Assessment/Plan (PT)    Rehab Potential (PT) good, to achieve stated therapy goals  -       Row Name 12/22/23 1656          Positioning and Restraints    Pre-Treatment Position sitting in chair/recliner  -     Post Treatment Position bed  -JM     In Bed fowlers;call light within reach;encouraged to call for assist;exit alarm on;with family/caregiver;notified nsg  -               User Key  (r) = Recorded By, (t) = Taken By, (c) = Cosigned By      Initials Name Provider Type    Rosa Asencio PTA Physical Therapist Assistant                   Outcome Measures       Row Name 12/22/23 1659          How much help from another person do you currently need...    Turning from your back to your side while in flat bed without using bedrails? 3  -JM     Moving from lying on back to sitting on the side of a flat bed without bedrails? 2  -JM     Moving to and from a bed to a chair (including a wheelchair)? 2  -JM     Standing up from a chair using your arms (e.g., wheelchair, bedside chair)? 2  -JM     Climbing 3-5 steps with a railing? 1  -JM     To walk in  hospital room? 2  -     AM-PAC 6 Clicks Score (PT) 12  -     Highest Level of Mobility Goal 4 --> Transfer to chair/commode  -JM       Row Name 12/22/23 1534          Modified [place] Scale    Modified [place] Scale 4 - Moderately severe disability.  Unable to walk without assistance, and unable to attend to own bodily needs without assistance.  -       Row Name 12/22/23 1534          Functional Assessment    Outcome Measure Options AM-PAC 6 Clicks Daily Activity (OT);Modified [place]  -RB               User Key  (r) = Recorded By, (t) = Taken By, (c) = Cosigned By      Initials Name Provider Type    Rosa Asencio PTA Physical Therapist Assistant    Sarah Carrillo OT Occupational Therapist                                 Physical Therapy Education       Title: PT OT SLP Therapies (In Progress)       Topic: Physical Therapy (Done)       Point: Mobility training (Done)       Learning Progress Summary             Patient Eager, E,TB,D, VU,NR by  at 12/22/2023 1700    Acceptance, E, VU by CLARISSA at 12/21/2023 1713                         Point: Home exercise program (Done)       Learning Progress Summary             Patient Eager, E,TB,D, VU,NR by  at 12/22/2023 1700    Acceptance, E, VU by DB at 12/21/2023 1713                         Point: Body mechanics (Done)       Learning Progress Summary             Patient Eager, E,TB,D, VU,NR by  at 12/22/2023 1700    Acceptance, E, VU by DB at 12/21/2023 1713                         Point: Precautions (Done)       Learning Progress Summary             Patient Eager, E,TB,D, VU,NR by  at 12/22/2023 1700    Acceptance, E, VU by DB at 12/21/2023 1713                                         User Key       Initials Effective Dates Name Provider Type Discipline     03/07/18 -  Rosa Portillo PTA Physical Therapist Assistant PT    CLARISSA 06/16/21 -  Felipa Weathers, GERI Physical Therapist PT                  PT Recommendation and Plan     Plan of Care Reviewed  With: patient  Progress: improving  Outcome Evaluation: Pt agreed to therapy, pt up in chair and required MAX2 STS from low chair, post lean initially, pt alexei amb ~20ft w/rwx , min A 2, cues for sequencing , foot placement, plans SNU at [place] as pt is below baseline and high falls risk, no C/O L ankle pain this session, wore shoes for this amb     Time Calculation:         PT Charges       Row Name 12/22/23 1648             Time Calculation    Start Time 1456  -      Stop Time 1520  -      Time Calculation (min) 24 min  -      PT Received On 12/22/23  -AMELIA      PT - Next Appointment 12/23/23  -AMELIA                User Key  (r) = Recorded By, (t) = Taken By, (c) = Cosigned By      Initials Name Provider Type    Rosa Asencio PTA Physical Therapist Assistant                  Therapy Charges for Today       Code Description Service Date Service Provider Modifiers Qty    86933394602 HC PT THER PROC EA 15 MIN 12/22/2023 Rosa Portillo PTA GP 2    36916594112 HC PT THER SUPP EA 15 MIN 12/22/2023 Rosa Portillo PTA GP 2            PT G-Codes  Outcome Measure Options: AM-PAC 6 Clicks Daily Activity (OT), Modified [place]  AM-PAC 6 Clicks Score (PT): 12  AM-PAC 6 Clicks Score (OT): 14  Modified [place] Scale: 4 - Moderately severe disability.  Unable to walk without assistance, and unable to attend to own bodily needs without assistance.  PT Discharge Summary  Anticipated Discharge Disposition (PT): skilled nursing facility    Rosa Portillo PTA  12/22/2023

## 2023-12-22 NOTE — PLAN OF CARE
Goal Outcome Evaluation:  Plan of Care Reviewed With: patient        Progress: improving  Outcome Evaluation: Pt agreed to therapy, pt up in chair and required MAX2 STS from low chair, post lean initially, pt alexei amb ~20ft w/rwx , min A 2, cues for sequencing , foot placement, plans SNU at [place] as pt is below baseline and high falls risk, no C/O L ankle pain this session, wore shoes for this amb      Anticipated Discharge Disposition (PT): skilled nursing facility

## 2023-12-22 NOTE — CONSULTS
FIRST UROLOGY CONSULT      Patient Identification:  NAME:  Anthony Gallegos  Age:  78 y.o.   Sex:  male   :  1945   MRN:  [id number]     Reason for Consult: Acute urinary retention    History of present illness:      Anthony Gallegos is a 78 y.o. male with a history of ankylosing spondylitis and chronic pain who presented to the ER on 23 after sustaining a fall at home with subsequent leg pain and inability to ambulate. Patient has noticed frequent falls recently. While in ER patient bladder scanned and noted to have 460 cc in bladder. A forde catheter was placed and patient was started on Flomax. Urology was consulted for evaluation and treatment of urinary retention. Patient denies abdominal pain and discomfort. Pt denies history of retention or lower urinary tract symptoms. Patient is unsure if he has ever had a prostate exam previously.  Denies hematuria, dysuria, fever, nausea or vomiting.    In hospital:  -AVSS, good UOP  -WBC - 3.91  -Creat - 0.62  -UA - Negative leukocytes, negative nitrites    -CT - Marked bladder distention. No renal stones or hydronephrosis. Moderate stool burden.    Asked to see    Past medical history:  Past Medical History:   Diagnosis Date    Abscess of scrotum     Arthritis     AS (ankylosing spondylitis)     Hypertension     Tetrahydrocannabinol (THC) use disorder, mild, abuse 2023    Uveitis        Past surgical history:  Past Surgical History:   Procedure Laterality Date    COLONOSCOPY      ROTATOR CUFF REPAIR Right     TOTAL HIP ARTHROPLASTY Left        Allergies:  Patient has no known allergies.    Home medications:  Medications Prior to Admission   Medication Sig Dispense Refill Last Dose    atenolol-chlorthalidone (TENORETIC) 50-25 MG per tablet TAKE 1 TABLET BY MOUTH DAILY. 90 tablet 1 2023    esomeprazole (nexIUM) 20 MG capsule Take 1 capsule by mouth Daily As Needed (asneeded).   Past Week    methotrexate 2.5 MG tablet Take 1 tablet by  mouth 2 (Two) Times a Week. Pt takes every Wednesday and thursday  5 Past Month    Omega-3 Fatty Acids (OMEGA 3 PO) Take 1 capsule by mouth Daily.   2023    Naproxen Sodium 220 MG capsule Take 220 mg of amoxicillin by mouth Daily.           Hospital medications:  acetaminophen, 1,000 mg, Oral, TID  atenolol, 50 mg, Oral, Q24H  Lidocaine, 2 patch, Transdermal, Q24H  methotrexate, 2.5 mg, Oral, Once per day on   predniSONE, 40 mg, Oral, Daily With Breakfast  senna-docusate sodium, 2 tablet, Oral, BID  sodium chloride, 10 mL, Intravenous, Q12H  tamsulosin, 0.4 mg, Oral, Nightly           acetaminophen **OR** acetaminophen **OR** acetaminophen    senna-docusate sodium **AND** polyethylene glycol **AND** bisacodyl **AND** bisacodyl    calcium carbonate    Calcium Replacement - Follow Nurse / BPA Driven Protocol    Magnesium Standard Dose Replacement - Follow Nurse / BPA Driven Protocol    melatonin    muscle rub    ondansetron ODT **OR** ondansetron    Phosphorus Replacement - Follow Nurse / BPA Driven Protocol    Potassium Replacement - Follow Nurse / BPA Driven Protocol    [COMPLETED] Insert Peripheral IV **AND** sodium chloride    sodium chloride    sodium chloride    traMADol    Family history:  Family History   Problem Relation Age of Onset    Alzheimer's disease Mother        Social history:  Social History     Tobacco Use    Smoking status: Never    Smokeless tobacco: Never   Vaping Use    Vaping Use: Never used   Substance Use Topics    Alcohol use: No    Drug use: No       Review of systems:      12 point negative except as in HPI    Objective:  TMax 24 hours:   Temp (24hrs), Av.8 °F (36.6 °C), Min:97.2 °F (36.2 °C), Max:98.8 °F (37.1 °C)      Vitals Ranges:   Temp:  [97.2 °F (36.2 °C)-98.8 °F (37.1 °C)] 97.5 °F (36.4 °C)  Heart Rate:  [54-62] 61  Resp:  [14-16] 16  BP: (100-110)/(48-65) 110/65    Intake/Output Last 3 shifts:  I/O last 3 completed shifts:  In: -   Out: 1000 [Urine:1000]      Physical Exam:    General Appearance:    Alert, cooperative, NAD, sitting in chair   Back:     No CVA tenderness   Lungs:     Respirations unlabored, no wheezing   Abdomen:     Soft, NDNT, no masses, no guarding   :    Mariscal catheter in place draining dark yellow urine; bladder non-distended, non-tender   Neuro/Psych:   Orientation intact, mood/affect pleasant       Results review:   I reviewed the patient's new clinical results.    Data review:  Lab Results (last 24 hours)       Procedure Component Value Units Date/Time    Comprehensive Metabolic Panel [[id number]]  (Abnormal) Collected: 12/22/23 0602    Specimen: Blood Updated: 12/22/23 0648     Glucose 98 mg/dL      BUN 15 mg/dL      Creatinine 0.62 mg/dL      Sodium 139 mmol/L      Potassium 4.1 mmol/L      Comment: Slight hemolysis detected by analyzer. Result may be falsely elevated.        Chloride 106 mmol/L      CO2 24.7 mmol/L      Calcium 8.0 mg/dL      Total Protein 6.1 g/dL      Albumin 2.9 g/dL      ALT (SGPT) 18 U/L      AST (SGOT) 46 U/L      Alkaline Phosphatase 34 U/L      Total Bilirubin 0.3 mg/dL      Globulin 3.2 gm/dL      A/G Ratio 0.9 g/dL      BUN/Creatinine Ratio 24.2     Anion Gap 8.3 mmol/L      eGFR 97.2 mL/min/1.73     Narrative:      GFR Normal >60  Chronic Kidney Disease <60  Kidney Failure <15    The GFR formula is only valid for adults with stable renal function between ages 18 and 70.    CK [[id number]]  (Abnormal) Collected: 12/22/23 0602    Specimen: Blood Updated: 12/22/23 0648     Creatine Kinase 844 U/L     CBC & Differential [[id number]]  (Abnormal) Collected: 12/22/23 0602    Specimen: Blood Updated: 12/22/23 0641    Narrative:      The following orders were created for panel order CBC & Differential.  Procedure                               Abnormality         Status                     ---------                               -----------         ------                     CBC Auto Differential[[id number]]        Abnormal             Final result                 Please view results for these tests on the individual orders.    CBC Auto Differential [[id number]]  (Abnormal) Collected: 12/22/23 0602    Specimen: Blood Updated: 12/22/23 0641     WBC 3.91 10*3/mm3      RBC 3.71 10*6/mm3      Hemoglobin 11.6 g/dL      Hematocrit 35.0 %      MCV 94.3 fL      MCH 31.3 pg      MCHC 33.1 g/dL      RDW 14.4 %      RDW-SD 48.8 fl      MPV 10.2 fL      Platelets 152 10*3/mm3      Neutrophil % 57.5 %      Lymphocyte % 29.9 %      Monocyte % 11.8 %      Eosinophil % 0.0 %      Basophil % 0.5 %      Immature Grans % 0.3 %      Neutrophils, Absolute 2.25 10*3/mm3      Lymphocytes, Absolute 1.17 10*3/mm3      Monocytes, Absolute 0.46 10*3/mm3      Eosinophils, Absolute 0.00 10*3/mm3      Basophils, Absolute 0.02 10*3/mm3      Immature Grans, Absolute 0.01 10*3/mm3      nRBC 0.0 /100 WBC     Potassium [[id number]]  (Normal) Collected: 12/21/23 1720    Specimen: Blood Updated: 12/21/23 1744     Potassium 4.6 mmol/L     CK [[id number]]  (Abnormal) Collected: 12/21/23 0732    Specimen: Blood Updated: 12/21/23 1446     Creatine Kinase 940 U/L              Imaging:  Imaging Results (Last 24 Hours)       Procedure Component Value Units Date/Time    XR Foot 3+ View Left [[id number]] Collected: 12/20/23 2218     Updated: 12/21/23 1430    Narrative:      LEFT FOOT     HISTORY: Left foot pain, swelling.     COMPARISON: None.     FINDINGS: 3 views of the left foot were obtained. The study is limited  somewhat by patient positioning. There is no evidence of fracture or  dislocation. There is no evidence of periosteal reaction.     This report was finalized on 12/21/2023 2:27 PM by Dr. Shola Mccracken M.D on Workstation: [id number]       XR Spine Lumbar Complete 4+VW [[id number]] Collected: 12/21/23 1228     Updated: 12/21/23 1238    Narrative:      XR SPINE LUMBAR COMPLETE 4+VW-     INDICATION: Ankylosing spondylitis post fall     COMPARISON: CT abdomen pelvis 12/20/2023        Impression:      Ankylosis throughout the lumbar spine and bilateral  sacroiliac joints consistent with known ankylosing spondylitis. No  subluxation. Vertebral body heights are maintained. No appreciable  interruption of the bridging syndesmophytes. If there is ongoing  clinical concern for fracture, given background of extensive ankylosing  spondylitis limiting evaluation, consider further evaluation with CT.     This report was finalized on 12/21/2023 12:35 PM by Dr. Landon Vaca M.D on Workstation: [id number]                Assessment:     Acute urinary retention   - I expect this to resolve as acute issues subside.  - Urinary retention is common for hospitalized patients due to the effects of recumbency and polypharmacy. Constipation likely exacerbating.   - Likely, multifactorial   Constipation  Fall    Plan:   - No acute urologic surgical intervention recommended.  - Continue Tamsulosin  - Continue indwelling catheter for now.   - Recommend voiding trial early AM of day of discharge or once patient is more ambulatory. Can do VT while in stay at rehab.   - If unable to void and/or PVR >250 mL, replace forde  - If the patient is discharged with an indwelling catheter, please arrange an outpatient consult in the urology office: [phone].  - Urology will sign off; please call with any questions/concerns or clinical change

## 2023-12-22 NOTE — CASE MANAGEMENT/SOCIAL WORK
Continued Stay Note  [facility]     Patient Name: Anthony Gallegos  MRN: [id number]  Today's Date: 12/22/2023    Admit Date: 12/20/2023    Plan: Need more SNF options.   Discharge Plan       Row Name 12/22/23 1021       Plan    Plan Need more SNF options.    Patient/Family in Agreement with Plan --    Plan Comments CCP contact [facility] patient insurance is out of network. Attempted to contact patient's daughter, voice mail is full and unable to reach.                   Discharge Codes    No documentation.                 Expected Discharge Date and Time       Expected Discharge Date Expected Discharge Time    Dec 23, 2023               Mariposa Orozco RN

## 2023-12-22 NOTE — PROGRESS NOTES
Name: Anthony Gallegos ADMIT: 2023   : 1945  PCP: Marco Macedo MD    MRN: [id number] LOS: 0 days   AGE/SEX: 78 y.o. male  ROOM: [facility]     Subjective   Subjective   Sitting up in chair.  Feels about the same as yesterday.  No new issues.    Objective   Objective   Vital Signs  Temp:  [97.2 °F (36.2 °C)-98.8 °F (37.1 °C)] 97.5 °F (36.4 °C)  Heart Rate:  [54-62] 54  Resp:  [14-16] 16  BP: (100-110)/(48-56) 100/48  SpO2:  [96 %-99 %] 98 %  on   ;   Device (Oxygen Therapy): room air  Body mass index is 22.54 kg/m².  Physical Exam  Constitutional:       Appearance: He is ill-appearing.   Cardiovascular:      Rate and Rhythm: Normal rate.      Pulses: Normal pulses.   Pulmonary:      Effort: Pulmonary effort is normal. No respiratory distress.      Breath sounds: Normal breath sounds.   Abdominal:      General: Abdomen is flat.      Palpations: Abdomen is soft.   Genitourinary:     Comments: Mariscal  Musculoskeletal:      Right lower leg: No edema.      Left lower leg: No edema.   Neurological:      Mental Status: He is alert.         Results Review     I reviewed the patient's new clinical results.  Results from last 7 days   Lab Units 23  0602 23  0732 23  1029   WBC 10*3/mm3 3.91 3.74 4.25   HEMOGLOBIN g/dL 11.6* 11.0* 13.0   PLATELETS 10*3/mm3 152 149 171     Results from last 7 days   Lab Units 23  0602 23  1720 23  0732 23  1029   SODIUM mmol/L 139  --  137 138   POTASSIUM mmol/L 4.1 4.6 3.5 3.8   CHLORIDE mmol/L 106  --  103 98   CO2 mmol/L 24.7  --  25.9 28.0   BUN mg/dL 15  --  13 18   CREATININE mg/dL 0.62*  --  0.80 1.00   GLUCOSE mg/dL 98  --  87 99   EGFR mL/min/1.73 97.2  --  90.0 76.6     Results from last 7 days   Lab Units 23  0602 23  0732 23  1029   ALBUMIN g/dL 2.9* 2.9* 3.8   BILIRUBIN mg/dL 0.3 0.3 0.4   ALK PHOS U/L 34* 35* 47   AST (SGOT) U/L 46* 45* 35   ALT (SGPT) U/L 18 11 12     Results from last 7 days   Lab Units  "12/22/23  0602 12/21/23  0732 12/20/23  1029   CALCIUM mg/dL 8.0* 7.9* 9.1   ALBUMIN g/dL 2.9* 2.9* 3.8   MAGNESIUM mg/dL  --  2.1 1.7     Results from last 7 days   Lab Units 12/20/23  2304 12/20/23 2003 12/20/23  1807   LACTATE mmol/L 1.8 2.4* 2.5*     No results found for: \"HGBA1C\", \"POCGLU\"    XR Spine Lumbar Complete 4+VW    Result Date: 12/21/2023  Ankylosis throughout the lumbar spine and bilateral sacroiliac joints consistent with known ankylosing spondylitis. No subluxation. Vertebral body heights are maintained. No appreciable interruption of the bridging syndesmophytes. If there is ongoing clinical concern for fracture, given background of extensive ankylosing spondylitis limiting evaluation, consider further evaluation with CT.  This report was finalized on 12/21/2023 12:35 PM by Dr. Landon Vaca M.D on Workstation: [id number]      CT Head Without Contrast    Result Date: 12/20/2023  1. No convincing acute intracranial abnormality is identified. 2. There is mild-to-moderate small vessel disease in the cerebral white matter and diffuse cerebral atrophy and osteoarthritic changes at the temporomandibular joints bilaterally. The remainder the head CT is normal and the etiology of this patient's dizziness is not established on this exam. If there remains any clinical suspicion of an acute stroke causing the dizziness, an MRI of the brain could be obtained for more complete assessment.  Radiation dose reduction techniques were utilized, including automated exposure control and exposure modulation based on body size.    This report was finalized on 12/20/2023 5:47 PM by Dr. Sharif Bermeo M.D on Workstation: [id number]      CT Abdomen Pelvis Without Contrast    Result Date: 12/20/2023   1.  Marked bladder distention. Correlate for possible urinary retention. Consider Mariscal catheter decompression. 2.  No renal stones or hydronephrosis. 3.  Moderate stool burden.   This report was finalized on 12/20/2023 1:26 PM " by Dr. Lo Tello M.D on Workstation: [id number]      XR Chest 1 View    Result Date: 12/20/2023  1. No acute process.   This report was finalized on 12/20/2023 11:06 AM by Dr. Dario Dumont M.D on Workstation: [id number]     Scheduled Medications  acetaminophen, 1,000 mg, Oral, TID  atenolol, 50 mg, Oral, Q24H  Lidocaine, 2 patch, Transdermal, Q24H  methotrexate, 2.5 mg, Oral, Once per day on Wed Thu  predniSONE, 40 mg, Oral, Daily With Breakfast  senna-docusate sodium, 2 tablet, Oral, BID  sodium chloride, 10 mL, Intravenous, Q12H  tamsulosin, 0.4 mg, Oral, Nightly    Infusions   Diet  Diet: Cardiac Diets; Healthy Heart (2-3 Na+); Texture: Regular Texture (IDDSI 7); Fluid Consistency: Thin (IDDSI 0)       Assessment/Plan     Active Hospital Problems    Diagnosis  POA    **Generalized weakness [R53.1]  Yes    Immobility [Z74.09]  Yes    Fall from bed [W06.XXXA]  Yes    Tetrahydrocannabinol (THC) use disorder, mild, abuse [F12.10]  Yes    Generalized pain [R52]  Yes     [Z63.4]  Yes    Grief [F43.21]  Yes    Urine retention [R33.9]  Yes    Constipation [K59.00]  Yes    DISH (disseminated idiopathic skeletal hyperostosis) [M48.10]  Yes    Hypertension [I10]  Yes    Ankylosing spondylitis of cervical region [M45.2]  Yes      Resolved Hospital Problems   No resolved problems to display.       78 y.o. male admitted with Generalized weakness.      12/22/23  DC IV fluids, pending SNF.    Generalized weakness  Immobility  Fall from bed  -Multifactorial secondary to age and chronic comorbidities.  -PT/OT rec SNF     Generalized pain  Disseminated idiopathic skeletal hyperostosis  Ankylosing spondylitis  -X-ray of lumbar spine with diffuse ankylosis without any obvious injury or fracture.  -methotrexate  -PO prednisone for pain     Urinary retention  Constipation  -Mariscal catheter in place.  Flomax added.  -Urology evaluated, follow-up in 2 weeks with voiding trial    Elevated CK, resolved  Lactic acidosis,  resolved  -downtrending, related to dehydration    Hypertension  -Atenolol; hold HCTZ- resume when appropriate         DVT prophylaxis: SCDs  Discussed with patient.  Anticipated discharge SNF, once arrangements made            Tom Abel MD  [facility]  12/22/23  08:08 EST

## 2023-12-22 NOTE — PLAN OF CARE
Problem: Malnutrition  Goal: Improved Nutritional Intake  Outcome: Ongoing, Progressing  Intervention: Promote and Optimize Oral Intake  Flowsheets (Taken 12/22/2023 1492)  Oral Nutrition Promotion: calorie-dense liquids provided (Pended)   Goal Outcome Evaluation:      Severe MSA. Ensure Compact vanilla BID B/D. Will CTM PO intake and wt.

## 2023-12-22 NOTE — PLAN OF CARE
Problem: Adult Inpatient Plan of Care  Goal: Plan of Care Review  Outcome: Ongoing, Progressing  Goal: Patient-Specific Goal (Individualized)  Outcome: Ongoing, Progressing  Goal: Absence of Hospital-Acquired Illness or Injury  Outcome: Ongoing, Progressing  Intervention: Identify and Manage Fall Risk  Recent Flowsheet Documentation  Taken 12/22/2023 0600 by Jaycee Acuna RN  Safety Promotion/Fall Prevention:   safety round/check completed   lighting adjusted   clutter free environment maintained   assistive device/personal items within reach  Taken 12/22/2023 0400 by Jaycee Acuna RN  Safety Promotion/Fall Prevention:   safety round/check completed   lighting adjusted   clutter free environment maintained   assistive device/personal items within reach  Taken 12/22/2023 0200 by Jaycee Acuna RN  Safety Promotion/Fall Prevention:   safety round/check completed   lighting adjusted   clutter free environment maintained   assistive device/personal items within reach  Taken 12/22/2023 0000 by Jaycee Acuna RN  Safety Promotion/Fall Prevention:   safety round/check completed   lighting adjusted   assistive device/personal items within reach   clutter free environment maintained  Taken 12/21/2023 2200 by Jaycee Acuna RN  Safety Promotion/Fall Prevention:   safety round/check completed   lighting adjusted   assistive device/personal items within reach   clutter free environment maintained  Taken 12/21/2023 1920 by Jaycee Acuna RN  Safety Promotion/Fall Prevention:   safety round/check completed   lighting adjusted   clutter free environment maintained   assistive device/personal items within reach  Intervention: Prevent and Manage VTE (Venous Thromboembolism) Risk  Recent Flowsheet Documentation  Taken 12/21/2023 1920 by Jaycee Acuna RN  Range of Motion: active ROM (range of motion) encouraged  Intervention: Prevent Infection  Recent Flowsheet Documentation  Taken 12/22/2023 0600 by  Jaycee Acuna RN  Infection Prevention:   single patient room provided   rest/sleep promoted  Taken 12/22/2023 0400 by Jaycee Acuna RN  Infection Prevention:   single patient room provided   rest/sleep promoted  Taken 12/22/2023 0200 by Jaycee Acuna RN  Infection Prevention:   single patient room provided   rest/sleep promoted  Taken 12/22/2023 0000 by Jaycee Acuna RN  Infection Prevention:   rest/sleep promoted   single patient room provided  Taken 12/21/2023 2200 by Jaycee Acuna RN  Infection Prevention:   single patient room provided   rest/sleep promoted  Taken 12/21/2023 1920 by Jaycee Acuna RN  Infection Prevention:   single patient room provided   rest/sleep promoted  Goal: Optimal Comfort and Wellbeing  Outcome: Ongoing, Progressing  Intervention: Provide Person-Centered Care  Recent Flowsheet Documentation  Taken 12/21/2023 1920 by Jaycee Acuna RN  Trust Relationship/Rapport:   care explained   reassurance provided   thoughts/feelings acknowledged   questions answered  Goal: Readiness for Transition of Care  Outcome: Ongoing, Progressing     Problem: Fall Injury Risk  Goal: Absence of Fall and Fall-Related Injury  Outcome: Ongoing, Progressing  Intervention: Identify and Manage Contributors  Recent Flowsheet Documentation  Taken 12/21/2023 1920 by Jaycee Acuna RN  Medication Review/Management: medications reviewed  Intervention: Promote Injury-Free Environment  Recent Flowsheet Documentation  Taken 12/22/2023 0600 by Jaycee Acuna RN  Safety Promotion/Fall Prevention:   safety round/check completed   lighting adjusted   clutter free environment maintained   assistive device/personal items within reach  Taken 12/22/2023 0400 by Jaycee Acuna RN  Safety Promotion/Fall Prevention:   safety round/check completed   lighting adjusted   clutter free environment maintained   assistive device/personal items within reach  Taken 12/22/2023 0200 by Jaycee Acuna  RN  Safety Promotion/Fall Prevention:   safety round/check completed   lighting adjusted   clutter free environment maintained   assistive device/personal items within reach  Taken 12/22/2023 0000 by Jaycee Acuna RN  Safety Promotion/Fall Prevention:   safety round/check completed   lighting adjusted   assistive device/personal items within reach   clutter free environment maintained  Taken 12/21/2023 2200 by Jaycee Acuna RN  Safety Promotion/Fall Prevention:   safety round/check completed   lighting adjusted   assistive device/personal items within reach   clutter free environment maintained  Taken 12/21/2023 1920 by Jaycee Acuna RN  Safety Promotion/Fall Prevention:   safety round/check completed   lighting adjusted   clutter free environment maintained   assistive device/personal items within reach     Problem: Skin Injury Risk Increased  Goal: Skin Health and Integrity  Outcome: Ongoing, Progressing   Goal Outcome Evaluation:         Goal Outcome Evaluation:  Plan of Care Reviewed With: patient  Progress: improving  Outcome Evaluation: he is alert and oriented x4. VSS on room air. He rested in bed throughout this shift. Tylenol was given for pain in the left foot. IVF infusing as ordered in the left ac, no acute distress noted, Call light and personal items within reach. Safety precautions in place.

## 2023-12-22 NOTE — CASE MANAGEMENT/SOCIAL WORK
Continued Stay Note  [facility]     Patient Name: Anthony Gallegos  MRN: [id number]  Today's Date: 12/22/2023    Admit Date: 12/20/2023    Plan: New referrals for SNF pending.   Discharge Plan       Row Name 12/22/23 1509       Plan    Plan New referrals for SNF pending.    Patient/Family in Agreement with Plan yes    Plan Comments Spoke with patient's daughter via inbound call. Patient's daughter [facility] and [facility]. Spoke with [facility]/ [facility] is no longer in network. Spoke with [facility]/ [facility] does not currently have beds available, will update CCP.                   Discharge Codes    No documentation.                 Expected Discharge Date and Time       Expected Discharge Date Expected Discharge Time    Dec 23, 2023               Mariposa Orozco RN

## 2023-12-22 NOTE — PAYOR COMM NOTE
"Anthony Gallegos (78 y.o. Male)          INPATIENT REQUEST FOR [id number]    THE PATIENT WAS ADMITTED 12/20 AS OBS AND CONVERTED TO INPATIENT 12/22/23    CONTACT DACIA PATTERSON  P# [phone]  F# [phone]         Date of Birth   1945    Social Security Number       Address   [address] [place], [place] [address]    Home Phone   [phone]    MRN   [id number]       [facility]olic   [facility]    Marital Status                               Admission Date   12/20/23    Admission Type   Emergency    Admitting Provider   Gail Anna MD    Attending Provider   Tom Abel MD    Department, Room/Bed   [address], P389/1       Discharge Date       Discharge Disposition       Discharge Destination                                 Attending Provider: Tom Abel MD    Allergies: No Known Allergies    Isolation: None   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)   Code Status: CPR    Ht: 190.5 cm (75\")   Wt: 81.8 kg (180 lb 5.4 oz)    Admission Cmt: None   Principal Problem: Generalized weakness [R53.1]                   Active Insurance as of 12/20/2023       Primary Coverage       Payor Plan Insurance Group Employer/Plan Group    AETNA MEDICARE REPLACEMENT AETNA MED ADV POS [id number]       Payor Plan Address Payor Plan Phone Number Payor Plan Fax Number Effective Dates    [address] [phone]  12/1/2023 - None Entered    [facility] [id number]         Subscriber Name Subscriber Birth Date Member ID       ANTHONY GALLEGOS 1945 [id number]                     Emergency Contacts        (Rel.) Home Phone Work Phone Mobile Phone    NATHALIE DON (Daughter) [phone] -- --    MAGY GALLEGOS [phone] -- --                 History & Physical        Gail Anna MD at 12/20/23 2572          PCP: Marco Macedo MD    Chief complaint   Chief Complaint   Patient presents with    Leg Pain       HPI  Patient is a 78 y.o. male " presents with with a history of ankylosing spondylitis diagnosed 35 years ago, hypertension, arthritis, chronic pain who presents to the ER due to fall and inability to ambulate.  Patient recently moved back from [place] about a week ago.  His wife  in May 2023.  He had seen a rheumatologist at their Dr. Hammonds and had been on Remicade and Enbrel and  steroids and is currently on methotrexate.  Patient is living in an apartment by himself.  He supposedly dropped his cell phone and fell.  Patient states that he did not hit his head.  That it may have been on the pillows.  But he could not get up he called his daughter and they got the son and the son got him up but he could not stand on his own.  He normally uses a cane.  He is somewhat of a poor historian and just says it hurts all over.  He is got chronic pain.  He occasionally uses THC.  He has a history of a left total hip in .  He is seeing Dr. Fields with rheumatology in the past when he lived here.      PAST MEDICAL HISTORY  Past Medical History:   Diagnosis Date    Abscess of scrotum     Arthritis     AS (ankylosing spondylitis)     Hypertension     Tetrahydrocannabinol (THC) use disorder, mild, abuse 2023    Uveitis        PAST SURGICAL HISTORY  Past Surgical History:   Procedure Laterality Date    COLONOSCOPY      ROTATOR CUFF REPAIR Right     TOTAL HIP ARTHROPLASTY Left        FAMILY HISTORY  Family History   Problem Relation Age of Onset    Alzheimer's disease Mother        SOCIAL HISTORY  Social History     Tobacco Use    Smoking status: Never    Smokeless tobacco: Never   Substance Use Topics    Alcohol use: No    Drug use: No       MEDICATIONS:  Medications Prior to Admission   Medication Sig Dispense Refill Last Dose    atenolol-chlorthalidone (TENORETIC) 50-25 MG per tablet TAKE 1 TABLET BY MOUTH DAILY. 90 tablet 1 2023    esomeprazole (nexIUM) 20 MG capsule Take 1 capsule by mouth Daily As Needed (asneeded).   Past Week  "   methotrexate 2.5 MG tablet Take 1 tablet by mouth 2 (Two) Times a Week. Pt takes every Wednesday and thursday  5 Past Month    Omega-3 Fatty Acids (OMEGA 3 PO) Take 1 capsule by mouth Daily.   12/19/2023    Naproxen Sodium 220 MG capsule Take 220 mg of amoxicillin by mouth Daily.          Allergies:  Patient has no known allergies.    Review of Systems:  Severe pain all over but back worse fatigue , poor mobility, polyarthritis  Negative for following (except as per HPI):  Constitution: chills, fevers,   Eyes: change of vision, loss of vision and discharge  ENT: ear drainage, ear ringing and facial trauma  Respiratory: cough, pleuritic pain, shortness of air  Cardiovascular: chest pressure, pain, lower extremity edema, palpitations  Gastrointestinal: constipation, diarrhea, nausea, vomiting, pain    Integument: rash and wound  Hematologic / Lymphatic: excessive bleeding and easy bruising  Musculoskeletal:  joint swelling and muscle pain  Neurological: headaches, numbness, seizures and tremors  Behavioral / Psych: anxiety, depression and hallucinations         Vital Signs  Temp:  [98 °F (36.7 °C)-98.8 °F (37.1 °C)] 98.8 °F (37.1 °C)  Heart Rate:  [53-73] 70  Resp:  [18] 18  BP: (120-151)/(65-76) 120/73  Flowsheet Rows      Flowsheet Row First Filed Value   Admission Height 190.5 cm (75\") Documented at 12/20/2023 1005   Admission Weight 81.9 kg (180 lb 8 oz) Documented at 12/20/2023 1005           Body mass index is 22.56 kg/m².   o2 sat    Physical Exam:  General Appearance:    Alert, cooperative, in acute distress 2/2 pain, grimacew   Head:    Normocephalic, without obvious abnormality, atraumatic   Eyes:         conjunctivae and sclerae normal, no icterus, PERRLA   ENT:    Ears grossly intact, oral mucosa moist,       Back:     Normal to inspection, range of motion normal   Lungs:     Clear to auscultation,respirations regular, even and                   unlabored    Heart:    Regular rhythm and normal " rate,  no murmur, normal S1 and S2,   Abdomen:     Normal bowel sounds, no masses,  soft non-tender, non-distended,    Extremities:   Moves all extremities well, no cyanosis, no edema,             Pulses:   Pulses palpable and equal bilaterally   Skin:   No bleeding, rash, bruising    Neurologic:    Psych:   Cranial nerves 2 - 12 grossly intact, sensation grossly intact,     Moves all extremities well, equal bilateral strength    Alert and Oriented x 3, Normal Affect    I washed/sanitized my hands before entering the room and immediately upon leaving the room.    LABS:  Admission on 12/20/2023   Component Date Value Ref Range Status    Glucose 12/20/2023 99  65 - 99 mg/dL Final    BUN 12/20/2023 18  8 - 23 mg/dL Final    Creatinine 12/20/2023 1.00  0.76 - 1.27 mg/dL Final    Sodium 12/20/2023 138  136 - 145 mmol/L Final    Potassium 12/20/2023 3.8  3.5 - 5.2 mmol/L Final    Slight hemolysis detected by analyzer. Result may be falsely elevated.    Chloride 12/20/2023 98  98 - 107 mmol/L Final    CO2 12/20/2023 28.0  22.0 - 29.0 mmol/L Final    Calcium 12/20/2023 9.1  8.6 - 10.5 mg/dL Final    Total Protein 12/20/2023 7.4  6.0 - 8.5 g/dL Final    Albumin 12/20/2023 3.8  3.5 - 5.2 g/dL Final    ALT (SGPT) 12/20/2023 12  1 - 41 U/L Final    AST (SGOT) 12/20/2023 35  1 - 40 U/L Final    Slight hemolysis detected by analyzer. Result may be falsely elevated.    Alkaline Phosphatase 12/20/2023 47  39 - 117 U/L Final    Total Bilirubin 12/20/2023 0.4  0.0 - 1.2 mg/dL Final    Globulin 12/20/2023 3.6  gm/dL Final    A/G Ratio 12/20/2023 1.1  g/dL Final    BUN/Creatinine Ratio 12/20/2023 18.0  7.0 - 25.0 Final    Anion Gap 12/20/2023 12.0  5.0 - 15.0 mmol/L Final    eGFR 12/20/2023 76.6  >60.0 mL/min/1.73 Final    Protime 12/20/2023 15.3 (H)  11.7 - 14.2 Seconds Final    INR 12/20/2023 1.20 (H)  0.90 - 1.10 Final    Lipase 12/20/2023 60  13 - 60 U/L Final    Color, UA 12/20/2023 Yellow  Yellow, Straw Final    Appearance, UA  12/20/2023 Cloudy (A)  Clear Final    pH, UA 12/20/2023 7.5  5.0 - 8.0 Final    Specific Gravity, UA 12/20/2023 1.019  1.005 - 1.030 Final    Glucose, UA 12/20/2023 Negative  Negative Final    Ketones, UA 12/20/2023 Negative  Negative Final    Bilirubin, UA 12/20/2023 Negative  Negative Final    Blood, UA 12/20/2023 Negative  Negative Final    Protein, UA 12/20/2023 Trace (A)  Negative Final    Leuk Esterase, UA 12/20/2023 Negative  Negative Final    Nitrite, UA 12/20/2023 Negative  Negative Final    Urobilinogen, UA 12/20/2023 1.0 E.U./dL  0.2 - 1.0 E.U./dL Final    proBNP 12/20/2023 263.0  0.0 - 1,800.0 pg/mL Final    C-Reactive Protein 12/20/2023 0.93 (H)  0.00 - 0.50 mg/dL Final    Sed Rate 12/20/2023 57 (H)  0 - 20 mm/hr Final    Ethanol 12/20/2023 <10  0 - 10 mg/dL Final    Ethanol % 12/20/2023 <0.010  % Final    Amphet/Methamphet, Screen 12/20/2023 Negative  Negative Final    Barbiturates Screen, Urine 12/20/2023 Negative  Negative Final    Benzodiazepine Screen, Urine 12/20/2023 Negative  Negative Final    Cocaine Screen, Urine 12/20/2023 Negative  Negative Final    Opiate Screen 12/20/2023 Positive (A)  Negative Final    THC, Screen, Urine 12/20/2023 Positive (A)  Negative Final    Methadone Screen, Urine 12/20/2023 Negative  Negative Final    Oxycodone Screen, Urine 12/20/2023 Negative  Negative Final    Fentanyl, Urine 12/20/2023 Negative  Negative Final    Creatine Kinase 12/20/2023 652 (H)  20 - 200 U/L Final    Magnesium 12/20/2023 1.7  1.6 - 2.4 mg/dL Final    TSH 12/20/2023 2.840  0.270 - 4.200 uIU/mL Final    WBC 12/20/2023 4.25  3.40 - 10.80 10*3/mm3 Final    RBC 12/20/2023 4.09 (L)  4.14 - 5.80 10*6/mm3 Final    Hemoglobin 12/20/2023 13.0  13.0 - 17.7 g/dL Final    Hematocrit 12/20/2023 39.3  37.5 - 51.0 % Final    MCV 12/20/2023 96.1  79.0 - 97.0 fL Final    MCH 12/20/2023 31.8  26.6 - 33.0 pg Final    MCHC 12/20/2023 33.1  31.5 - 35.7 g/dL Final    RDW 12/20/2023 14.7  12.3 - 15.4 % Final     RDW-SD 12/20/2023 51.1  37.0 - 54.0 fl Final    MPV 12/20/2023 9.3  6.0 - 12.0 fL Final    Platelets 12/20/2023 171  140 - 450 10*3/mm3 Final    Neutrophil % 12/20/2023 60.6  42.7 - 76.0 % Final    Lymphocyte % 12/20/2023 19.5 (L)  19.6 - 45.3 % Final    Monocyte % 12/20/2023 18.8 (H)  5.0 - 12.0 % Final    Eosinophil % 12/20/2023 0.2 (L)  0.3 - 6.2 % Final    Basophil % 12/20/2023 0.7  0.0 - 1.5 % Final    Immature Grans % 12/20/2023 0.2  0.0 - 0.5 % Final    Neutrophils, Absolute 12/20/2023 2.57  1.70 - 7.00 10*3/mm3 Final    Lymphocytes, Absolute 12/20/2023 0.83  0.70 - 3.10 10*3/mm3 Final    Monocytes, Absolute 12/20/2023 0.80  0.10 - 0.90 10*3/mm3 Final    Eosinophils, Absolute 12/20/2023 0.01  0.00 - 0.40 10*3/mm3 Final    Basophils, Absolute 12/20/2023 0.03  0.00 - 0.20 10*3/mm3 Final    Immature Grans, Absolute 12/20/2023 0.01  0.00 - 0.05 10*3/mm3 Final    nRBC 12/20/2023 0.0  0.0 - 0.2 /100 WBC Final         DIAGNOSTICS:  CT Head Without Contrast    Result Date: 12/20/2023  1. No convincing acute intracranial abnormality is identified. 2. There is mild-to-moderate small vessel disease in the cerebral white matter and diffuse cerebral atrophy and osteoarthritic changes at the temporomandibular joints bilaterally. The remainder the head CT is normal and the etiology of this patient's dizziness is not established on this exam. If there remains any clinical suspicion of an acute stroke causing the dizziness, an MRI of the brain could be obtained for more complete assessment.  Radiation dose reduction techniques were utilized, including automated exposure control and exposure modulation based on body size.       CT Abdomen Pelvis Without Contrast    Result Date: 12/20/2023   1.  Marked bladder distention. Correlate for possible urinary retention. Consider Mariscal catheter decompression. 2.  No renal stones or hydronephrosis. 3.  Moderate stool burden.   This report was finalized on 12/20/2023 1:26 PM by Dr. Blanchard  Elisha SMILEY on Workstation: [id number]     diffuse osseous demineralization. There is ventral flowing syndesmophytes  throughout the spine, consistent with diffuse idiopathic skeletal  hyperostosis (DISH)    XR Chest 1 View    Result Date: 12/20/2023  1. No acute process.   This report was finalized on 12/20/2023 11:06 AM by Dr. Dario Dumont M.D on Workstation: [id number]            Results Review:   I reviewed the patient's new clinical results.  Discussed with ER physician  Old records reviewed / Medical Decision Making High Complexity  I personally viewed and interpreted the patient's EKG/Telemetry data- sinus rhythm      ASSESSMENT AND PLAN    Immobility    Ankylosing spondylitis of cervical region    Hypertension    Fall from bed    Tetrahydrocannabinol (THC) use disorder, mild, abuse    Generalized pain        Grief    Urine retention    Constipation     Acute on chronic pain but pt states its generalized.  Patient has ankylosing spondylitis and ventral flowing syndesmophytes throughout the spine, consistent with diffuse idiopathic skeletalhyperostosis (DISH), polyarthritis with history of uveitis  -Was on methotrexate and steroids and had been on Remicade Brunilda and Enbrel in the past and had seen Dr. Fields previously before going [place] where he supposedly saw Dr. Hammonds  -Will place on an acute steroid for now to help decrease inflamation  -Patient likely needs to follow up with Dr. Fields  - Exercises include postural training, range of motion exercises, stretching, recreational activities, and sometimes hydrotherapy. Spinal manipulation should be avoided in patients with spinal fusion or advanced spinal osteoporosis  -Get an L-spine x-ray-if severely abnormal may consider spine eval.  Patient does not have a history of fusion  -PT and OT to evaluate and treat  -try lidoderm patches-   -Patient may benefit to going to pain management  -If no other contraindications pt may benefit from  duloxetine      Urine retention  -Denies any urinary accidents, denies any numbness in groin or rectum.  -Could be anticholinergic or due to pain.  Will place Mariscal and start on Flomax    Constipation  -start on stool regimen    Very poor mobility due to chronic pain.  Uses a cane.  Will get PT and OT to evaluate and treat     grief-  -wife  2023   -new home- patient has now moved back to [facility] to be closer to his kids.  Close living in an apartment by himself.    Per pt wt loss- will get nutrition to eval     Fall   -Patient states that the fall was not that bad and it was on the pillows    Hypertension-continue medical management-     Thc use due to pain    HOME MEDS HELD:   -Restart when clinically appropriate      Chronic medical conditions being monitored- stable, continue medical management  -  -      +DVT proph:      + CODE STATUS: Full       I discussed the patient's findings and my recommendations with the patient and/or family.  Please reference all orders placed.    Gail Anna MD  23  17:50 EST      This document is intended for medical expert use only. Reading of this document by patients and/or patient's family without participating in medical staff guidance may result in misinterpretation and unintended morbidity. Any interpretation of such data is the responsibility of the patient and/or family member responsible for the patient in concert with their primary or specialist providers, and NOT to be left for sources of online searches such as [employer], [employer] or similar queries. Relying on these approaches to knowledge may result in misinterpretation, misguided goals of care and even death should patients or family members try recommendations outside of the realm of professional medical care in a supervised way.    Dictated utilizing Voice dictation:  Parts of this note may be an electronic transcription/translation of spoke language to printed text using Dragon dictation  system.    Electronically signed by Gail Anna MD at 12/20/23 2312          Emergency Department Notes        Delano Gamez, RN at 12/20/23 1550          Nursing report ED to floor  Anthony Gallegos  78 y.o.  male    HPI :   Chief Complaint   Patient presents with    Leg Pain       Admitting doctor:   Gail Anna MD    Admitting diagnosis:   The primary encounter diagnosis was Muscle pain. Diagnoses of Dizziness, Immobility, Fall, initial encounter, Elevated CK, and Arthralgia, unspecified joint were also pertinent to this visit.    Code status:   Current Code Status       Date Active Code Status Order ID Comments User Context       Not on file            Allergies:   Patient has no known allergies.    Isolation:   No active isolations    Intake and Output    Intake/Output Summary (Last 24 hours) at 12/20/2023 1550  Last data filed at 12/20/2023 0947  Gross per 24 hour   Intake 200 ml   Output --   Net 200 ml       Weight:       12/20/23  1005   Weight: 81.9 kg (180 lb 8 oz)       Most recent vitals:   Vitals:    12/20/23 1347 12/20/23 1401 12/20/23 1406 12/20/23 1407   BP:  138/69     Pulse: 55 55 73 66   Resp:       Temp:       SpO2: 100% 99% 100% 100%   Weight:       Height:           Active LDAs/IV Access:   Lines, Drains & Airways       Active LDAs       Name Placement date Placement time Site Days    Peripheral IV 12/20/23 0947 Anterior;Distal;Left;Upper Arm 12/20/23  0947  Arm  less than 1                    Labs (abnormal labs have a star):   Labs Reviewed   PROTIME-INR - Abnormal; Notable for the following components:       Result Value    Protime 15.3 (*)     INR 1.20 (*)     All other components within normal limits   URINALYSIS W/ MICROSCOPIC IF INDICATED (NO CULTURE) - Abnormal; Notable for the following components:    Appearance, UA Cloudy (*)     Protein, UA Trace (*)     All other components within normal limits    Narrative:     Urine microscopic not indicated.   C-REACTIVE  PROTEIN - Abnormal; Notable for the following components:    C-Reactive Protein 0.93 (*)     All other components within normal limits   SEDIMENTATION RATE - Abnormal; Notable for the following components:    Sed Rate 57 (*)     All other components within normal limits   URINE DRUG SCREEN - Abnormal; Notable for the following components:    Opiate Screen Positive (*)     THC, Screen, Urine Positive (*)     All other components within normal limits    Narrative:     Negative Thresholds Per Drugs Screened:    Amphetamines                 500 ng/ml  Barbiturates                 200 ng/ml  Benzodiazepines              100 ng/ml  Cocaine                      300 ng/ml  Methadone                    300 ng/ml  Opiates                      300 ng/ml  Oxycodone                    100 ng/ml  THC                           50 ng/ml  Fentanyl                       5 ng/ml      The Normal Value for all drugs tested is negative. This report includes final unconfirmed screening results to be used for medical treatment purposes only. Unconfirmed results must not be used for non-medical purposes such as employment or legal testing. Clinical consideration should be applied to any drug of abuse test, particularly when unconfirmed results are used.           CK - Abnormal; Notable for the following components:    Creatine Kinase 652 (*)     All other components within normal limits   CBC WITH AUTO DIFFERENTIAL - Abnormal; Notable for the following components:    RBC 4.09 (*)     Lymphocyte % 19.5 (*)     Monocyte % 18.8 (*)     Eosinophil % 0.2 (*)     All other components within normal limits   LIPASE - Normal   BNP (IN-HOUSE) - Normal    Narrative:     This assay is used as an aid in the diagnosis of individuals suspected of having heart failure. It can be used as an aid in the diagnosis of acute decompensated heart failure (ADHF) in patients presenting with signs and symptoms of ADHF to the emergency department (ED). In addition,  NT-proBNP of <300 pg/mL indicates ADHF is not likely.    Age Range Result Interpretation  NT-proBNP Concentration (pg/mL:      <50             Positive            >450                   Gray                 300-450                    Negative             <300    50-75           Positive            >900                  Gray                300-900                  Negative            <300      >75             Positive            >1800                  Gray                300-1800                  Negative            <300   MAGNESIUM - Normal   TSH - Normal   COMPREHENSIVE METABOLIC PANEL    Narrative:     GFR Normal >60  Chronic Kidney Disease <60  Kidney Failure <15    The GFR formula is only valid for adults with stable renal function between ages 18 and 70.   ETHANOL   CBC AND DIFFERENTIAL    Narrative:     The following orders were created for panel order CBC & Differential.  Procedure                               Abnormality         Status                     ---------                               -----------         ------                     CBC Auto Differential[[id number]]        Abnormal            Final result                 Please view results for these tests on the individual orders.       EKG:   No orders to display       Meds given in ED:   Medications   sodium chloride 0.9 % flush 10 mL (has no administration in time range)   sodium chloride 0.9 % infusion (100 mL/hr Intravenous Currently Infusing 12/20/23 1359)   morphine injection 2 mg (2 mg Intravenous Given 12/20/23 1038)   ondansetron (ZOFRAN) injection 4 mg (4 mg Intravenous Given 12/20/23 1038)       Imaging results:  CT Head Without Contrast    Result Date: 12/20/2023  1. No convincing acute intracranial abnormality is identified. 2. There is mild-to-moderate small vessel disease in the cerebral white matter and diffuse cerebral atrophy and osteoarthritic changes at the temporomandibular joints bilaterally. The remainder the head CT is  normal and the etiology of this patient's dizziness is not established on this exam. If there remains any clinical suspicion of an acute stroke causing the dizziness, an MRI of the brain could be obtained for more complete assessment.  Radiation dose reduction techniques were utilized, including automated exposure control and exposure modulation based on body size.       CT Abdomen Pelvis Without Contrast    Result Date: 2023   1.  Marked bladder distention. Correlate for possible urinary retention. Consider Mariscal catheter decompression. 2.  No renal stones or hydronephrosis. 3.  Moderate stool burden.   This report was finalized on 2023 1:26 PM by Dr. Lo Tello M.D on Workstation: [id number]      XR Chest 1 View    Result Date: 2023  1. No acute process.   This report was finalized on 2023 11:06 AM by Dr. Dario Dumont M.D on Workstation: [id number]       Ambulatory status:   ax2    Social issues:   Social History     Socioeconomic History    Marital status:     Number of children: 2   Tobacco Use    Smoking status: Never    Smokeless tobacco: Never   Substance and Sexual Activity    Alcohol use: No    Drug use: No    Sexual activity: Yes       NIH Stroke Scale:       Registered Nurse, RN  23 15:50 EST         Electronically signed by Delano Gamez RN at 23 1551       Ashleigh Early MD at 23 0957           EMERGENCY DEPARTMENT ENCOUNTER    Room Number:    PCP: Marco Macedo MD  Patient Care Team:  Marco Macedo MD as PCP - General (Family Medicine)   Independent Historians: Patient and son    HPI:  Chief Complaint: Pain and dizziness    A complete HPI/ROS/PMH/PSH/SH/FH are unobtainable due to: Patient is not a thorough historian    Chronic or social conditions impacting patient care (Social Determinants of Health): Recently moved back to [place] after his wife   (Financial Resource Strain / Food Insecurity / Transportation Needs / Physical  "Activity / Stress / Social Connections / Intimate Partner Violence / Housing Stability)    Context: Anthony Gallegos is a 78 y.o. male with history of hypertension who presents to the ED c/o acute generalized weakness and body wide pain.  Patient says he has had a lot of issues going on for \"a long time\".  Patient is new back to [place] for the past week after moving his residence after his wife .  Today patient dropped his cell phone and reached over from bed to get it and fell.  Patient was stuck in a small area unable to call his daughter.  His daughter called his son who came over to try and help him when he got up from the floor he noted him to be very weak and dizzy.  Patient seems uncomfortable to even sit complaining of body wide pain 10 out of 10 most of the time since he is arrived back to [place].  Patient himself describes significant weight loss but cannot quantify it nor tell me over what kind of timeframe.  He just complains of feeling \"bad\".    Review of prior external notes (non-ED) -and- Review of prior external test results outside of this encounter: I reviewed primary care provider notes from Dr. Macedo from 2018.  Patient was seen at that time for hypertension and BPH and a wound on his left heel.  Notes reflect that patient plans to move to [place] and obtain primary care there.    Prescription drug monitoring program review:         PAST MEDICAL HISTORY  Active Ambulatory Problems     Diagnosis Date Noted    Ankylosing spondylitis of cervical region 2017    Recent unexplained weight loss 2017    Abnormal serum lipase level 08/10/2017    Abnormal serum level of amylase 08/10/2017    Hypertension 10/19/2017    Boil 2018     Resolved Ambulatory Problems     Diagnosis Date Noted    No Resolved Ambulatory Problems     Past Medical History:   Diagnosis Date    Abscess of scrotum     Arthritis     AS (ankylosing spondylitis)     Uveitis          PAST SURGICAL " HISTORY  Past Surgical History:   Procedure Laterality Date    COLONOSCOPY      ROTATOR CUFF REPAIR Right     TOTAL HIP ARTHROPLASTY Left 2014         FAMILY HISTORY  Family History   Problem Relation Age of Onset    Alzheimer's disease Mother          SOCIAL HISTORY  Social History     Socioeconomic History    Marital status:     Number of children: 2   Tobacco Use    Smoking status: Never    Smokeless tobacco: Never   Substance and Sexual Activity    Alcohol use: No    Drug use: No    Sexual activity: Yes         ALLERGIES  Patient has no known allergies.        REVIEW OF SYSTEMS  Review of Systems  Included in HPI  All systems reviewed and negative except for those discussed in HPI.      PHYSICAL EXAM    I have reviewed the triage vital signs and nursing notes.    ED Triage Vitals [12/20/23 0946]   Temp Heart Rate Resp BP SpO2   98 °F (36.7 °C) 62 18 137/70 100 %      Temp src Heart Rate Source Patient Position BP Location FiO2 (%)   -- -- -- -- --       Physical Exam  GENERAL: Pleasant thin cooperative and conversant but chronic ill appearing M, alert, no acute distress  SKIN: Warm, dry  HENT: Normocephalic, atraumatic  EYES: no scleral icterus, EOMI, no conjunctivitis  Neck: No carotid bruits  CV: regular rhythm, regular rate, no murmur  RESPIRATORY: normal effort, lungs clear, no wheezing, no rhonchi  ABDOMEN: soft, nontender, nondistended, no rebound, no guarding  MUSCULOSKELETAL: no deformity, no lower extremity edema, no calf tenderness to palpation  NEURO: alert,, face symmetric, speech normal, moves all extremities, follows commands                                                               LAB RESULTS  Recent Results (from the past 24 hour(s))   Comprehensive Metabolic Panel    Collection Time: 12/20/23 10:29 AM    Specimen: Blood   Result Value Ref Range    Glucose 99 65 - 99 mg/dL    BUN 18 8 - 23 mg/dL    Creatinine 1.00 0.76 - 1.27 mg/dL    Sodium 138 136 - 145 mmol/L    Potassium 3.8 3.5 -  5.2 mmol/L    Chloride 98 98 - 107 mmol/L    CO2 28.0 22.0 - 29.0 mmol/L    Calcium 9.1 8.6 - 10.5 mg/dL    Total Protein 7.4 6.0 - 8.5 g/dL    Albumin 3.8 3.5 - 5.2 g/dL    ALT (SGPT) 12 1 - 41 U/L    AST (SGOT) 35 1 - 40 U/L    Alkaline Phosphatase 47 39 - 117 U/L    Total Bilirubin 0.4 0.0 - 1.2 mg/dL    Globulin 3.6 gm/dL    A/G Ratio 1.1 g/dL    BUN/Creatinine Ratio 18.0 7.0 - 25.0    Anion Gap 12.0 5.0 - 15.0 mmol/L    eGFR 76.6 >60.0 mL/min/1.73   Protime-INR    Collection Time: 12/20/23 10:29 AM    Specimen: Blood   Result Value Ref Range    Protime 15.3 (H) 11.7 - 14.2 Seconds    INR 1.20 (H) 0.90 - 1.10   Lipase    Collection Time: 12/20/23 10:29 AM    Specimen: Blood   Result Value Ref Range    Lipase 60 13 - 60 U/L   BNP    Collection Time: 12/20/23 10:29 AM    Specimen: Blood   Result Value Ref Range    proBNP 263.0 0.0 - 1,800.0 pg/mL   C-reactive Protein    Collection Time: 12/20/23 10:29 AM    Specimen: Blood   Result Value Ref Range    C-Reactive Protein 0.93 (H) 0.00 - 0.50 mg/dL   Sedimentation Rate    Collection Time: 12/20/23 10:29 AM    Specimen: Blood   Result Value Ref Range    Sed Rate 57 (H) 0 - 20 mm/hr   Ethanol    Collection Time: 12/20/23 10:29 AM    Specimen: Blood   Result Value Ref Range    Ethanol <10 0 - 10 mg/dL    Ethanol % <0.010 %   CK    Collection Time: 12/20/23 10:29 AM    Specimen: Blood   Result Value Ref Range    Creatine Kinase 652 (H) 20 - 200 U/L   Magnesium    Collection Time: 12/20/23 10:29 AM    Specimen: Blood   Result Value Ref Range    Magnesium 1.7 1.6 - 2.4 mg/dL   TSH    Collection Time: 12/20/23 10:29 AM    Specimen: Blood   Result Value Ref Range    TSH 2.840 0.270 - 4.200 uIU/mL   CBC Auto Differential    Collection Time: 12/20/23 10:29 AM    Specimen: Blood   Result Value Ref Range    WBC 4.25 3.40 - 10.80 10*3/mm3    RBC 4.09 (L) 4.14 - 5.80 10*6/mm3    Hemoglobin 13.0 13.0 - 17.7 g/dL    Hematocrit 39.3 37.5 - 51.0 %    MCV 96.1 79.0 - 97.0 fL    MCH  31.8 26.6 - 33.0 pg    MCHC 33.1 31.5 - 35.7 g/dL    RDW 14.7 12.3 - 15.4 %    RDW-SD 51.1 37.0 - 54.0 fl    MPV 9.3 6.0 - 12.0 fL    Platelets 171 140 - 450 10*3/mm3    Neutrophil % 60.6 42.7 - 76.0 %    Lymphocyte % 19.5 (L) 19.6 - 45.3 %    Monocyte % 18.8 (H) 5.0 - 12.0 %    Eosinophil % 0.2 (L) 0.3 - 6.2 %    Basophil % 0.7 0.0 - 1.5 %    Immature Grans % 0.2 0.0 - 0.5 %    Neutrophils, Absolute 2.57 1.70 - 7.00 10*3/mm3    Lymphocytes, Absolute 0.83 0.70 - 3.10 10*3/mm3    Monocytes, Absolute 0.80 0.10 - 0.90 10*3/mm3    Eosinophils, Absolute 0.01 0.00 - 0.40 10*3/mm3    Basophils, Absolute 0.03 0.00 - 0.20 10*3/mm3    Immature Grans, Absolute 0.01 0.00 - 0.05 10*3/mm3    nRBC 0.0 0.0 - 0.2 /100 WBC   Urinalysis With Microscopic If Indicated (No Culture) - Urine, Clean Catch    Collection Time: 12/20/23 11:50 AM    Specimen: Urine, Clean Catch   Result Value Ref Range    Color, UA Yellow Yellow, Straw    Appearance, UA Cloudy (A) Clear    pH, UA 7.5 5.0 - 8.0    Specific Gravity, UA 1.019 1.005 - 1.030    Glucose, UA Negative Negative    Ketones, UA Negative Negative    Bilirubin, UA Negative Negative    Blood, UA Negative Negative    Protein, UA Trace (A) Negative    Leuk Esterase, UA Negative Negative    Nitrite, UA Negative Negative    Urobilinogen, UA 1.0 E.U./dL 0.2 - 1.0 E.U./dL   Urine Drug Screen - Urine, Clean Catch    Collection Time: 12/20/23 11:50 AM    Specimen: Urine, Clean Catch   Result Value Ref Range    Amphet/Methamphet, Screen Negative Negative    Barbiturates Screen, Urine Negative Negative    Benzodiazepine Screen, Urine Negative Negative    Cocaine Screen, Urine Negative Negative    Opiate Screen Positive (A) Negative    THC, Screen, Urine Positive (A) Negative    Methadone Screen, Urine Negative Negative    Oxycodone Screen, Urine Negative Negative    Fentanyl, Urine Negative Negative       I ordered the above labs and independently reviewed the results.        RADIOLOGY  CT Head  Without Contrast    Result Date: 12/20/2023  EMERGENCY CT SCAN OF THE HEAD WITHOUT CONTRAST ON 12/20/2023  CLINICAL HISTORY: Dizziness. The patient personally reports dizziness and syncope, also has some abdominal pain and weakness.  TECHNIQUE: Spiral CT images were obtained from the base of the skull to the vertex without intravenous contrast. The images were reformatted and submitted in 3 mm thick axial, sagittal and coronal CT sections with brain algorithm.  There are no prior head CTs or MRIs of the brain for comparison.  FINDINGS: There is some patchy low-density in the periventricular white matter most pronounced in the frontal periventricular white matter with some extension into the subcortical white matter consistent with mild-to-moderate small vessel disease. The remainder of the brain parenchyma is normal in attenuation. There is diffuse cerebral atrophy. The lateral and third ventricles are upper limits of normal in size. I see no mass effect, no midline shift and no extra-axial fluid collections are identified. There is no evidence of acute intracranial hemorrhage. The calvarium and the skull base are normal in appearance. The paranasal sinuses, mastoid air cells and middle ear cavities are clear.  The visualized superior halves of the orbits are normal in appearance. There are osteoarthritic changes in the temporomandibular joints with marginal spurring off the mandibular heads bilaterally. There are arthritic changes at the atlantoaxial articulation.      1. No convincing acute intracranial abnormality is identified. 2. There is mild-to-moderate small vessel disease in the cerebral white matter and diffuse cerebral atrophy and osteoarthritic changes at the temporomandibular joints bilaterally. The remainder the head CT is normal and the etiology of this patient's dizziness is not established on this exam. If there remains any clinical suspicion of an acute stroke causing the dizziness, an MRI of the  brain could be obtained for more complete assessment.  Radiation dose reduction techniques were utilized, including automated exposure control and exposure modulation based on body size.       CT Abdomen Pelvis Without Contrast    Result Date: 12/20/2023  CT ABDOMEN PELVIS WO CONTRAST-  HISTORY: Flank pain, dizziness, syncope, weakness.  TECHNIQUE:  CT of the abdomen and pelvis was performed without intravenous contrast. Evaluation of solid organs and vasculature is limited without intravenous contrast. Radiation dose reduction techniques were utilized, including automated exposure control and exposure modulation based on body size.  COMPARISON: CT abdomen and pelvis 7/7/2017  FINDINGS:  Heart size is normal. There is no pericardial effusion. There is minimal dependent atelectasis and or scarring in the posterior lung bases. Pleural spaces are clear. The liver is normal in size. The gallbladder is present. The spleen is normal in size. There are no pancreatic calcifications. The adrenal glands are within normal limits. There is a 5.4 cm cyst in the inferior right kidney, which is increased in size from 2017, previously 3.1 cm when remeasured. There are no renal stones or hydronephrosis. There is moderate calcific atherosclerosis. Evaluation of abdominal and pelvic lymph nodes is limited by lack of intravenous and oral contrast and paucity of intra-abdominal/pelvic fat. There is no bowel obstruction. There is a moderate predominately right-sided colonic stool burden. The appendix is visualized. The bladder is markedly distended to the level of the umbilicus. The prostate is enlarged. There is a partially imaged left hip total arthroplasty. Metallic streak artifact limits evaluation of adjacent structures. There is diffuse osseous demineralization. There is ventral flowing syndesmophytes throughout the spine, consistent with diffuse idiopathic skeletal hyperostosis (DISH).        1.  Marked bladder distention.  Correlate for possible urinary retention. Consider Mariscal catheter decompression. 2.  No renal stones or hydronephrosis. 3.  Moderate stool burden.   This report was finalized on 12/20/2023 1:26 PM by Dr. Lo Tello M.D on Workstation: [id number]      XR Chest 1 View    Result Date: 12/20/2023  XR CHEST 1 VW-12/20/2023  HISTORY: Back pain. Weakness.  Heart size is within normal limits. Lungs appear free of acute infiltrates. Bones and soft tissues are unremarkable. Chest appears unchanged from the 6/21/2017 study.      1. No acute process.   This report was finalized on 12/20/2023 11:06 AM by Dr. Dario Dumont M.D on Workstation: [id number]       I ordered the above noted radiological studies. Reviewed by me. See dictation for official radiology interpretation.      PROCEDURES    Procedures      MEDICATIONS GIVEN IN ER    Medications   sodium chloride 0.9 % flush 10 mL (has no administration in time range)   sodium chloride 0.9 % infusion (100 mL/hr Intravenous Currently Infusing 12/20/23 1359)   morphine injection 2 mg (2 mg Intravenous Given 12/20/23 1038)   ondansetron (ZOFRAN) injection 4 mg (4 mg Intravenous Given 12/20/23 1038)         ORDERS PLACED DURING THIS VISIT:  Orders Placed This Encounter   Procedures    XR Chest 1 View    CT Head Without Contrast    CT Abdomen Pelvis Without Contrast    Comprehensive Metabolic Panel    Protime-INR    Lipase    Urinalysis With Microscopic If Indicated (No Culture) - Urine, Clean Catch    BNP    C-reactive Protein    Sedimentation Rate    Ethanol    Urine Drug Screen - Urine, Clean Catch    CK    Magnesium    TSH    CBC Auto Differential    [facility] (on-call MD unless specified) Details    Insert Peripheral IV    Initiate Observation Status    CBC & Differential         PROGRESS, DATA ANALYSIS, CONSULTS, AND MEDICAL DECISION MAKING    All labs have been independently interpreted by me.  All radiology studies have been reviewed by me.   EKG's independently viewed and  interpreted by me.  Discussion below represents my analysis of pertinent findings related to patient's condition, differential diagnosis, treatment plan and final disposition.    MDM patient presenting with generalized weakness, dizziness, and body wide pain.  Patient also reporting some weight loss and is a poor historian overall recently moved back to [place] after his wife .  Son seems to remark on patient complaining of severe pain even with sitting, walking, or any activity.  Patient does not however have any specific complaints other than the fall he had this morning out of bed.  Will evaluate for electrolyte abnormalities, anemia, infection, rhabdo, intra-abdominal mass or pathologic fracture or bony mets, chest/lung mass or effusions, intracranial hemorrhage or mass effect from fall or mass.  Patient amenable to broad workup.  Patient will need to establish primary care here in [facility].    ED Course as of 23 1503   Wed Dec 20, 2023   1405 I discussed all results with patient and his son.  Plan for ambulation trial and discharge home with establishment of a new primary care physician here in [place] if he does well.  Will reassess [AR]   1423 Patient markedly unsteady on his feet.  Even with a cane for stabilization, patient is huge falls risk.  Plan admission for further evaluation. [AR]   1425 On record review patient does carry a history of ankylosing spondylitis but does not report this to me.  He used to be on methotrexate and some other therapies for presumably this condition.  Not certain the patient is having a flareup of this condition or some other issue but patient can definitely not go home alone in his current state. [AR]   1502 I discussed patient's case with Dr. Anna on for A who was amenable to obs admission for further care. [AR]      ED Course User Index  [AR] Ashleigh Early MD       I interpreted the cardiac monitor rhythm and my independent interpretation is: normal  sinus rhythm, rate of 60s. The cardiac monitor was ordered to monitor for arrhythmias.    PPE: I wore and adhered to appropriate PPE per hospital protocols for specific patient presentation. (For respiratory patients with suspected Covid-19 or other infectious etiology suspected for patient's symptoms, the patient wore a mask and I wore an N95 mask throughout the entire patient encounter.) Proper hand hygiene both before and after patient encounter was performed as well.         AS OF 15:03 EST VITALS:    BP - 138/69  HR - 55  TEMP - 98 °F (36.7 °C)  O2 SATS - 99%        DIAGNOSIS  Final diagnoses:   Muscle pain   Dizziness   Immobility   Fall, initial encounter   Elevated CK   Arthralgia, unspecified joint         DISPOSITION  ED Disposition       ED Disposition   Decision to Admit    Condition   --    Comment   Level of Care: Med/Surg [1]   Diagnosis: Immobility [331045]   Admitting Physician: TAMIA MEHTA [[id number]]   Attending Physician: TAMIA MEHTA [[id number]]                    Note Disclaimer: At [facility], we believe that sharing information builds trust and better relationships. You are receiving this note because you recently visited [facility]. It is possible you will see health information before a provider has talked with you about it. This kind of information can be easy to misunderstand. To help you fully understand what it means for your health, we urge you to discuss this note with your provider.         Ashleigh Early MD  23 1503      Electronically signed by Ashleigh Early MD at 23 1503          Physician Progress Notes (last 72 hours)        Tom Abel MD at 23 0808              Name: Anthony Gallegos ADMIT: 2023   : 1945  PCP: Marco Macedo MD    MRN: [id number] LOS: 0 days   AGE/SEX: 78 y.o. male  ROOM:      Subjective   Subjective   Sitting up in chair.  Feels about the same as yesterday.  No new  issues.    Objective   Objective   Vital Signs  Temp:  [97.2 °F (36.2 °C)-98.8 °F (37.1 °C)] 97.5 °F (36.4 °C)  Heart Rate:  [54-62] 54  Resp:  [14-16] 16  BP: (100-110)/(48-56) 100/48  SpO2:  [96 %-99 %] 98 %  on   ;   Device (Oxygen Therapy): room air  Body mass index is 22.54 kg/m².  Physical Exam  Constitutional:       Appearance: He is ill-appearing.   Cardiovascular:      Rate and Rhythm: Normal rate.      Pulses: Normal pulses.   Pulmonary:      Effort: Pulmonary effort is normal. No respiratory distress.      Breath sounds: Normal breath sounds.   Abdominal:      General: Abdomen is flat.      Palpations: Abdomen is soft.   Genitourinary:     Comments: Mariscal  Musculoskeletal:      Right lower leg: No edema.      Left lower leg: No edema.   Neurological:      Mental Status: He is alert.         Results Review     I reviewed the patient's new clinical results.  Results from last 7 days   Lab Units 12/22/23  0602 12/21/23  0732 12/20/23  1029   WBC 10*3/mm3 3.91 3.74 4.25   HEMOGLOBIN g/dL 11.6* 11.0* 13.0   PLATELETS 10*3/mm3 152 149 171     Results from last 7 days   Lab Units 12/22/23  0602 12/21/23  1720 12/21/23  0732 12/20/23  1029   SODIUM mmol/L 139  --  137 138   POTASSIUM mmol/L 4.1 4.6 3.5 3.8   CHLORIDE mmol/L 106  --  103 98   CO2 mmol/L 24.7  --  25.9 28.0   BUN mg/dL 15  --  13 18   CREATININE mg/dL 0.62*  --  0.80 1.00   GLUCOSE mg/dL 98  --  87 99   EGFR mL/min/1.73 97.2  --  90.0 76.6     Results from last 7 days   Lab Units 12/22/23  0602 12/21/23  0732 12/20/23  1029   ALBUMIN g/dL 2.9* 2.9* 3.8   BILIRUBIN mg/dL 0.3 0.3 0.4   ALK PHOS U/L 34* 35* 47   AST (SGOT) U/L 46* 45* 35   ALT (SGPT) U/L 18 11 12     Results from last 7 days   Lab Units 12/22/23  0602 12/21/23  0732 12/20/23  1029   CALCIUM mg/dL 8.0* 7.9* 9.1   ALBUMIN g/dL 2.9* 2.9* 3.8   MAGNESIUM mg/dL  --  2.1 1.7     Results from last 7 days   Lab Units 12/20/23  2304 12/20/23 2003 12/20/23  1807   LACTATE mmol/L 1.8 2.4* 2.5*  "    No results found for: \"HGBA1C\", \"POCGLU\"    XR Spine Lumbar Complete 4+VW    Result Date: 12/21/2023  Ankylosis throughout the lumbar spine and bilateral sacroiliac joints consistent with known ankylosing spondylitis. No subluxation. Vertebral body heights are maintained. No appreciable interruption of the bridging syndesmophytes. If there is ongoing clinical concern for fracture, given background of extensive ankylosing spondylitis limiting evaluation, consider further evaluation with CT.  This report was finalized on 12/21/2023 12:35 PM by Dr. Landon Vaca M.D on Workstation: [id number]      CT Head Without Contrast    Result Date: 12/20/2023  1. No convincing acute intracranial abnormality is identified. 2. There is mild-to-moderate small vessel disease in the cerebral white matter and diffuse cerebral atrophy and osteoarthritic changes at the temporomandibular joints bilaterally. The remainder the head CT is normal and the etiology of this patient's dizziness is not established on this exam. If there remains any clinical suspicion of an acute stroke causing the dizziness, an MRI of the brain could be obtained for more complete assessment.  Radiation dose reduction techniques were utilized, including automated exposure control and exposure modulation based on body size.    This report was finalized on 12/20/2023 5:47 PM by Dr. Sharif Bermeo M.D on Workstation: [id number]      CT Abdomen Pelvis Without Contrast    Result Date: 12/20/2023   1.  Marked bladder distention. Correlate for possible urinary retention. Consider Mariscal catheter decompression. 2.  No renal stones or hydronephrosis. 3.  Moderate stool burden.   This report was finalized on 12/20/2023 1:26 PM by Dr. Lo Tello M.D on Workstation: [id number]      XR Chest 1 View    Result Date: 12/20/2023  1. No acute process.   This report was finalized on 12/20/2023 11:06 AM by Dr. Dario Dumont M.D on Workstation: [id number]     Scheduled " Medications  acetaminophen, 1,000 mg, Oral, TID  atenolol, 50 mg, Oral, Q24H  Lidocaine, 2 patch, Transdermal, Q24H  methotrexate, 2.5 mg, Oral, Once per day on Wed Thu  predniSONE, 40 mg, Oral, Daily With Breakfast  senna-docusate sodium, 2 tablet, Oral, BID  sodium chloride, 10 mL, Intravenous, Q12H  tamsulosin, 0.4 mg, Oral, Nightly    Infusions   Diet  Diet: Cardiac Diets; Healthy Heart (2-3 Na+); Texture: Regular Texture (IDDSI 7); Fluid Consistency: Thin (IDDSI 0)      Assessment/Plan     Active Hospital Problems    Diagnosis  POA    **Generalized weakness [R53.1]  Yes    Immobility [Z74.09]  Yes    Fall from bed [W06.XXXA]  Yes    Tetrahydrocannabinol (THC) use disorder, mild, abuse [F12.10]  Yes    Generalized pain [R52]  Yes     [Z63.4]  Yes    Grief [F43.21]  Yes    Urine retention [R33.9]  Yes    Constipation [K59.00]  Yes    DISH (disseminated idiopathic skeletal hyperostosis) [M48.10]  Yes    Hypertension [I10]  Yes    Ankylosing spondylitis of cervical region [M45.2]  Yes      Resolved Hospital Problems   No resolved problems to display.       78 y.o. male admitted with Generalized weakness.      12/22/23  DC IV fluids, pending SNF.    Generalized weakness  Immobility  Fall from bed  -Multifactorial secondary to age and chronic comorbidities.  -PT/OT rec SNF     Generalized pain  Disseminated idiopathic skeletal hyperostosis  Ankylosing spondylitis  -X-ray of lumbar spine with diffuse ankylosis without any obvious injury or fracture.  -methotrexate  -PO prednisone for pain     Urinary retention  Constipation  -Mariscal catheter in place.  Flomax added.  -Urology evaluated, follow-up in 2 weeks with voiding trial    Elevated CK, resolved  Lactic acidosis, resolved  -downtrending, related to dehydration    Hypertension  -Atenolol; hold HCTZ- resume when appropriate         DVT prophylaxis: SCDs  Discussed with patient.  Anticipated discharge SNF, once arrangements made            Tom Abel  MD [facility]  23  08:08 EST        Electronically signed by Tom Abel MD at 23 1537       Salome Forbes, APRN at 23 1130              Name: Anthony Gallegos ADMIT: 2023   : 1945  PCP: Marco Macedo MD    MRN: [id number] LOS: 0 days   AGE/SEX: 78 y.o. male  ROOM: [facility]     Subjective   Subjective   Resting in bed.  No family at bedside.  He denies any current complaints including chest pain, dyspnea, cough, fever or chills.  Denies any nausea, vomiting or abdominal pain.  States he has not had issues with urinating in the past.  States that he has not yet established care with rheumatology here and that he is currently on methotrexate.    Objective   Objective   Vital Signs  Temp:  [97.7 °F (36.5 °C)-98.8 °F (37.1 °C)] 97.7 °F (36.5 °C)  Heart Rate:  [56-82] 58  Resp:  [14-18] 14  BP: (102-126)/(54-86) 104/55  SpO2:  [96 %-100 %] 96 %  on   ;   Device (Oxygen Therapy): room air  Body mass index is 22.56 kg/m².    Physical Exam  Vitals and nursing note reviewed.   Constitutional:       Appearance: He is ill-appearing (Chronically).   Cardiovascular:      Rate and Rhythm: Normal rate and regular rhythm.      Pulses: Normal pulses.   Pulmonary:      Effort: Pulmonary effort is normal. No respiratory distress.      Comments: Diminished on room air  Abdominal:      General: Bowel sounds are normal. There is no distension.      Palpations: Abdomen is soft.      Tenderness: There is no abdominal tenderness.   Genitourinary:     Comments: Mariscal anchored  Musculoskeletal:         General: No swelling or tenderness. Normal range of motion.   Skin:     General: Skin is warm and dry.      Findings: No bruising.   Neurological:      General: No focal deficit present.      Mental Status: He is alert and oriented to person, place, and time.      Sensory: No sensory deficit.      Motor: Weakness present.      Coordination: Coordination normal.   Psychiatric:        "  Mood and Affect: Mood normal.         Behavior: Behavior normal.      Comments: Seems a little guarded/flat       Results Review:       I reviewed the patient's new clinical results.  Results from last 7 days   Lab Units 12/21/23  0732 12/20/23  1029   WBC 10*3/mm3 3.74 4.25   HEMOGLOBIN g/dL 11.0* 13.0   PLATELETS 10*3/mm3 149 171     Results from last 7 days   Lab Units 12/21/23  0732 12/20/23  1029   SODIUM mmol/L 137 138   POTASSIUM mmol/L 3.5 3.8   CHLORIDE mmol/L 103 98   CO2 mmol/L 25.9 28.0   BUN mg/dL 13 18   CREATININE mg/dL 0.80 1.00   GLUCOSE mg/dL 87 99   Estimated Creatinine Clearance: 86.7 mL/min (by C-G formula based on SCr of 0.8 mg/dL).  Results from last 7 days   Lab Units 12/21/23  0732 12/20/23  1029   ALBUMIN g/dL 2.9* 3.8   BILIRUBIN mg/dL 0.3 0.4   ALK PHOS U/L 35* 47   AST (SGOT) U/L 45* 35   ALT (SGPT) U/L 11 12     Results from last 7 days   Lab Units 12/21/23  0732 12/20/23  1029   CALCIUM mg/dL 7.9* 9.1   ALBUMIN g/dL 2.9* 3.8   MAGNESIUM mg/dL 2.1 1.7     Results from last 7 days   Lab Units 12/20/23  2304 12/20/23  2003 12/20/23  1807   LACTATE mmol/L 1.8 2.4* 2.5*     No results found for: \"HGBA1C\", \"POCGLU\"    acetaminophen, 1,000 mg, Oral, TID  atenolol, 50 mg, Oral, Q24H   And  chlorthalidone, 25 mg, Oral, Q24H  Lidocaine, 2 patch, Transdermal, Q24H  methotrexate, 2.5 mg, Oral, Once per day on Wed Thu  potassium chloride ER, 40 mEq, Oral, Q4H  predniSONE, 40 mg, Oral, Daily With Breakfast  senna-docusate sodium, 2 tablet, Oral, BID  sodium chloride, 10 mL, Intravenous, Q12H  tamsulosin, 0.4 mg, Oral, Nightly      sodium chloride 0.9 % with KCl 20 mEq, 125 mL/hr, Last Rate: 125 mL/hr (12/20/23 1941)    Diet: Cardiac Diets; Healthy Heart (2-3 Na+); Texture: Regular Texture (IDDSI 7); Fluid Consistency: Thin (IDDSI 0)      Assessment/Plan     Active Hospital Problems    Diagnosis  POA    **Generalized weakness [R53.1]  Yes    Immobility [Z74.09]  Yes    Fall from bed [W06.XXXA]  Yes "    Tetrahydrocannabinol (THC) use disorder, mild, abuse [F12.10]  Yes    Generalized pain [R52]  Yes     [Z63.4]  Yes    Grief [F43.21]  Yes    Urine retention [R33.9]  Yes    Constipation [K59.00]  Yes    DISH (disseminated idiopathic skeletal hyperostosis) [M48.10]  Yes    Hypertension [I10]  Yes    Ankylosing spondylitis of cervical region [M45.2]  Yes      Resolved Hospital Problems   No resolved problems to display.     Mr. Gallegos is a 78 y.o. male that presented to the hospital after a fall and inability to ambulate from home.  He recently moved to [place] from [place] to be closer to his children after the recent death of his wife a few months back.  He was followed by rheumatologist there and had been on Remicade as well as temporal in the past for ankylosing spondylitis.  He is currently on methotrexate.  He was noted to be diffusely weak with generalized pain and admitted to the hospital for further evaluation.    Generalized weakness  Immobility  Fall from bed  -Multifactorial secondary to age and chronic comorbidities.  -PT/OT have been consulted.  Likely going to need some sort of rehabilitation.    Elevated CK  Lactic acidosis  -No signs of secondary infection.  UA and chest x-ray normal.  -Suspect he is clinically dry and currently on IV fluids.  -Continuing to monitor CMP and CK.    Generalized pain  Disseminated idiopathic skeletal hyperostosis  Ankylosing spondylitis  -X-ray of lumbar spine with diffuse ankylosis without any obvious injury or fracture.  -Patient currently denying any pain to me at this time.  Will hold off on CT unless symptoms change.  -Home methotrexate resumed.  Oral prednisone started.  -Need close outpatient follow-up with rheumatology.  -Will add agents as needed for pain.    Urinary retention  Constipation  -Mariscal catheter in place.  Flomax added.  -Will attempt voiding trial when more mobile.  -Did have moderate stool burden on imaging.  Likely contributing  to some of the urinary retention.  Will continue bowel regimen.     Grief  THC use  -Access center was consulted and following.    Hypertension  -Atenolol continues.  -Will hold chlorthalidone.  -BP overall stable.     Discussed with patient and Dr. Abel.    VTE Prophylaxis - SCDs  Code Status - Full code  Disposition - Anticipate discharge TBD- likely needs placement.       NOHEMI Weiss  [facility]  23  14:25 EST    Electronically signed by Salome Forbes APRN at 23 1432          Consult Notes (last 72 hours)        Shaylee Rodriguez APRN at 23 1114        Consult Orders    1. Inpatient Urology Consult [[id number]] ordered by Tom Abel MD at 23 1911              Attestation signed by Walter Jimenez Jr., MD at 23 1242    Patient seen and examined. I participated in all eason portions of the encounter. I have reviewed this documentation and agree.    Plan:  - continue indwelling catheter until ambulating well.  - will arrange follow up in office in 2 weeks to discuss further management                       FIRST UROLOGY CONSULT      Patient Identification:  NAME:  Anthony Gallegos  Age:  78 y.o.   Sex:  male   :  1945   MRN:  [id number]     Reason for Consult: Acute urinary retention    History of present illness:      Anthony Gallegos is a 78 y.o. male with a history of ankylosing spondylitis and chronic pain who presented to the ER on 23 after sustaining a fall at home with subsequent leg pain and inability to ambulate. Patient has noticed frequent falls recently. While in ER patient bladder scanned and noted to have 460 cc in bladder. A forde catheter was placed and patient was started on Flomax. Urology was consulted for evaluation and treatment of urinary retention. Patient denies abdominal pain and discomfort. Pt denies history of retention or lower urinary tract symptoms. Patient is unsure if he has ever had a prostate  exam previously.  Denies hematuria, dysuria, fever, nausea or vomiting.    In hospital:  -AVSS, good UOP  -WBC - 3.91  -Creat - 0.62  -UA - Negative leukocytes, negative nitrites    -CT - Marked bladder distention. No renal stones or hydronephrosis. Moderate stool burden.    Asked to see    Past medical history:  Past Medical History:   Diagnosis Date    Abscess of scrotum     Arthritis     AS (ankylosing spondylitis)     Hypertension     Tetrahydrocannabinol (THC) use disorder, mild, abuse 12/20/2023    Uveitis        Past surgical history:  Past Surgical History:   Procedure Laterality Date    COLONOSCOPY      ROTATOR CUFF REPAIR Right     TOTAL HIP ARTHROPLASTY Left 2014       Allergies:  Patient has no known allergies.    Home medications:  Medications Prior to Admission   Medication Sig Dispense Refill Last Dose    atenolol-chlorthalidone (TENORETIC) 50-25 MG per tablet TAKE 1 TABLET BY MOUTH DAILY. 90 tablet 1 12/20/2023    esomeprazole (nexIUM) 20 MG capsule Take 1 capsule by mouth Daily As Needed (asneeded).   Past Week    methotrexate 2.5 MG tablet Take 1 tablet by mouth 2 (Two) Times a Week. Pt takes every Wednesday and thursday  5 Past Month    Omega-3 Fatty Acids (OMEGA 3 PO) Take 1 capsule by mouth Daily.   12/19/2023    Naproxen Sodium 220 MG capsule Take 220 mg of amoxicillin by mouth Daily.           Hospital medications:  acetaminophen, 1,000 mg, Oral, TID  atenolol, 50 mg, Oral, Q24H  Lidocaine, 2 patch, Transdermal, Q24H  methotrexate, 2.5 mg, Oral, Once per day on Wed Thu  predniSONE, 40 mg, Oral, Daily With Breakfast  senna-docusate sodium, 2 tablet, Oral, BID  sodium chloride, 10 mL, Intravenous, Q12H  tamsulosin, 0.4 mg, Oral, Nightly           acetaminophen **OR** acetaminophen **OR** acetaminophen    senna-docusate sodium **AND** polyethylene glycol **AND** bisacodyl **AND** bisacodyl    calcium carbonate    Calcium Replacement - Follow Nurse / BPA Driven Protocol    Magnesium Standard Dose  Replacement - Follow Nurse / BPA Driven Protocol    melatonin    muscle rub    ondansetron ODT **OR** ondansetron    Phosphorus Replacement - Follow Nurse / BPA Driven Protocol    Potassium Replacement - Follow Nurse / BPA Driven Protocol    [COMPLETED] Insert Peripheral IV **AND** sodium chloride    sodium chloride    sodium chloride    traMADol    Family history:  Family History   Problem Relation Age of Onset    Alzheimer's disease Mother        Social history:  Social History     Tobacco Use    Smoking status: Never    Smokeless tobacco: Never   Vaping Use    Vaping Use: Never used   Substance Use Topics    Alcohol use: No    Drug use: No       Review of systems:      12 point negative except as in HPI    Objective:  TMax 24 hours:   Temp (24hrs), Av.8 °F (36.6 °C), Min:97.2 °F (36.2 °C), Max:98.8 °F (37.1 °C)      Vitals Ranges:   Temp:  [97.2 °F (36.2 °C)-98.8 °F (37.1 °C)] 97.5 °F (36.4 °C)  Heart Rate:  [54-62] 61  Resp:  [14-16] 16  BP: (100-110)/(48-65) 110/65    Intake/Output Last 3 shifts:  I/O last 3 completed shifts:  In: -   Out: 1000 [Urine:1000]     Physical Exam:    General Appearance:    Alert, cooperative, NAD, sitting in chair   Back:     No CVA tenderness   Lungs:     Respirations unlabored, no wheezing   Abdomen:     Soft, NDNT, no masses, no guarding   :    Mariscal catheter in place draining dark yellow urine; bladder non-distended, non-tender   Neuro/Psych:   Orientation intact, mood/affect pleasant       Results review:   I reviewed the patient's new clinical results.    Data review:  Lab Results (last 24 hours)       Procedure Component Value Units Date/Time    Comprehensive Metabolic Panel [[id number]]  (Abnormal) Collected: 23 06    Specimen: Blood Updated: 23     Glucose 98 mg/dL      BUN 15 mg/dL      Creatinine 0.62 mg/dL      Sodium 139 mmol/L      Potassium 4.1 mmol/L      Comment: Slight hemolysis detected by analyzer. Result may be falsely elevated.         Chloride 106 mmol/L      CO2 24.7 mmol/L      Calcium 8.0 mg/dL      Total Protein 6.1 g/dL      Albumin 2.9 g/dL      ALT (SGPT) 18 U/L      AST (SGOT) 46 U/L      Alkaline Phosphatase 34 U/L      Total Bilirubin 0.3 mg/dL      Globulin 3.2 gm/dL      A/G Ratio 0.9 g/dL      BUN/Creatinine Ratio 24.2     Anion Gap 8.3 mmol/L      eGFR 97.2 mL/min/1.73     Narrative:      GFR Normal >60  Chronic Kidney Disease <60  Kidney Failure <15    The GFR formula is only valid for adults with stable renal function between ages 18 and 70.    CK [[id number]]  (Abnormal) Collected: 12/22/23 0602    Specimen: Blood Updated: 12/22/23 0648     Creatine Kinase 844 U/L     CBC & Differential [[id number]]  (Abnormal) Collected: 12/22/23 0602    Specimen: Blood Updated: 12/22/23 0641    Narrative:      The following orders were created for panel order CBC & Differential.  Procedure                               Abnormality         Status                     ---------                               -----------         ------                     CBC Auto Differential[[id number]]        Abnormal            Final result                 Please view results for these tests on the individual orders.    CBC Auto Differential [[id number]]  (Abnormal) Collected: 12/22/23 0602    Specimen: Blood Updated: 12/22/23 0641     WBC 3.91 10*3/mm3      RBC 3.71 10*6/mm3      Hemoglobin 11.6 g/dL      Hematocrit 35.0 %      MCV 94.3 fL      MCH 31.3 pg      MCHC 33.1 g/dL      RDW 14.4 %      RDW-SD 48.8 fl      MPV 10.2 fL      Platelets 152 10*3/mm3      Neutrophil % 57.5 %      Lymphocyte % 29.9 %      Monocyte % 11.8 %      Eosinophil % 0.0 %      Basophil % 0.5 %      Immature Grans % 0.3 %      Neutrophils, Absolute 2.25 10*3/mm3      Lymphocytes, Absolute 1.17 10*3/mm3      Monocytes, Absolute 0.46 10*3/mm3      Eosinophils, Absolute 0.00 10*3/mm3      Basophils, Absolute 0.02 10*3/mm3      Immature Grans, Absolute 0.01 10*3/mm3      nRBC 0.0 /100 WBC      Potassium [[id number]]  (Normal) Collected: 12/21/23 1720    Specimen: Blood Updated: 12/21/23 1744     Potassium 4.6 mmol/L     CK [[id number]]  (Abnormal) Collected: 12/21/23 0732    Specimen: Blood Updated: 12/21/23 1446     Creatine Kinase 940 U/L              Imaging:  Imaging Results (Last 24 Hours)       Procedure Component Value Units Date/Time    XR Foot 3+ View Left [[id number]] Collected: 12/20/23 2218     Updated: 12/21/23 1430    Narrative:      LEFT FOOT     HISTORY: Left foot pain, swelling.     COMPARISON: None.     FINDINGS: 3 views of the left foot were obtained. The study is limited  somewhat by patient positioning. There is no evidence of fracture or  dislocation. There is no evidence of periosteal reaction.     This report was finalized on 12/21/2023 2:27 PM by Dr. Shola Mccracken M.D on Workstation: [id number]       XR Spine Lumbar Complete 4+VW [[id number]] Collected: 12/21/23 1228     Updated: 12/21/23 1238    Narrative:      XR SPINE LUMBAR COMPLETE 4+VW-     INDICATION: Ankylosing spondylitis post fall     COMPARISON: CT abdomen pelvis 12/20/2023       Impression:      Ankylosis throughout the lumbar spine and bilateral  sacroiliac joints consistent with known ankylosing spondylitis. No  subluxation. Vertebral body heights are maintained. No appreciable  interruption of the bridging syndesmophytes. If there is ongoing  clinical concern for fracture, given background of extensive ankylosing  spondylitis limiting evaluation, consider further evaluation with CT.     This report was finalized on 12/21/2023 12:35 PM by Dr. Landon Vaca M.D on Workstation: [id number]                Assessment:     Acute urinary retention   - I expect this to resolve as acute issues subside.  - Urinary retention is common for hospitalized patients due to the effects of recumbency and polypharmacy. Constipation likely exacerbating.   - Likely, multifactorial   Constipation  Fall    Plan:   - No acute urologic  surgical intervention recommended.  - Continue Tamsulosin  - Continue indwelling catheter for now.   - Recommend voiding trial early AM of day of discharge or once patient is more ambulatory. Can do VT while in stay at rehab.   - If unable to void and/or PVR >250 mL, replace forde  - If the patient is discharged with an indwelling catheter, please arrange an outpatient consult in the urology office: [phone].  - Urology will sign off; please call with any questions/concerns or clinical change       Electronically signed by Walter Jimenez Jr., MD at 23 1242          Physical Therapy Notes (last 48 hours)        Felipa Weathers, PT at 23  Version 1 of 1         Goal Outcome Evaluation:  Plan of Care Reviewed With: patient         Patient is a 78 y.o. male admitted to [facility] for fall, immobility, and inc'd weakness on 2023. PMHx includes ankylosing spondylitis, HTN. Patient is (I) at baseline and lives alone. He denies VIRGIE or stairs and uses a cane at baseline. Today, patient performed bed mobility with modA, required modAx2 for transfers, and ambulated 5' with use of RW and Chuckie x2. Strength, balance, and activity tolerance deficits noted. Patient may benefit from skilled PT services acutely to address functional deficits as well as improve level of independence prior to discharge. Anticipate SNF upon DC.        Anticipated Discharge Disposition (PT): skilled nursing facility    Electronically signed by Felipa Weathers, PT at 23       Felipa Weathers, PT at 23  Version 1 of 1         Patient Name: Anthony Gallegos  : 1945    MRN: [id number]                              Today's Date: 2023       Admit Date: 2023    Visit Dx:     ICD-10-CM ICD-9-CM   1. Muscle pain  M79.10 729.1   2. Dizziness  R42 780.4   3. Immobility  Z74.09 799.89   4. Fall, initial encounter  W19.XXXA E888.9   5. Elevated CK  R74.8 790.5   6. Arthralgia, unspecified joint   M25.50 719.40     Patient Active Problem List   Diagnosis    Ankylosing spondylitis of cervical region    Recent unexplained weight loss    Abnormal serum lipase level    Abnormal serum level of amylase    Hypertension    Boil    Immobility    Fall from bed    Tetrahydrocannabinol (THC) use disorder, mild, abuse    Generalized pain        Grief    Urine retention    Constipation    DISH (disseminated idiopathic skeletal hyperostosis)    Generalized weakness     Past Medical History:   Diagnosis Date    Abscess of scrotum     Arthritis     AS (ankylosing spondylitis)     Hypertension     Tetrahydrocannabinol (THC) use disorder, mild, abuse 12/20/2023    Uveitis      Past Surgical History:   Procedure Laterality Date    COLONOSCOPY      ROTATOR CUFF REPAIR Right     TOTAL HIP ARTHROPLASTY Left 2014      General Information       Row Name 12/21/23 1642          Physical Therapy Time and Intention    Document Type evaluation  -DB     Mode of Treatment individual therapy;physical therapy  -DB       Row Name 12/21/23 1642          General Information    Patient Profile Reviewed yes  -DB     Prior Level of Function independent:;ADL's;driving;shopping  -DB     Existing Precautions/Restrictions fall  -DB     Barriers to Rehab none identified  -DB       Row Name 12/21/23 1642          Living Environment    People in Home alone  -DB       Row Name 12/21/23 1642          Home Main Entrance    Number of Stairs, Main Entrance none  -DB       Row Name 12/21/23 1642          Stairs Within Home, Primary    Number of Stairs, Within Home, Primary none  -DB       Row Name 12/21/23 1642          Cognition    Orientation Status (Cognition) oriented x 4  -DB       Row Name 12/21/23 1642          Safety Issues, Functional Mobility    Safety Issues Affecting Function (Mobility) sequencing abilities  -DB     Impairments Affecting Function (Mobility) balance;endurance/activity tolerance;strength  -DB               User Key  (r) =  Recorded By, (t) = Taken By, (c) = Cosigned By      Initials Name Provider Type    DB Felipa Weathers PT Physical Therapist                   Mobility       Row Name 12/21/23 1642          Bed Mobility    Bed Mobility supine-sit  -DB     Supine-Sit [place] (Bed Mobility) moderate assist (50% patient effort);verbal cues;nonverbal cues (demo/gesture)  -DB     Assistive Device (Bed Mobility) bed rails;head of bed elevated  -DB     Comment, (Bed Mobility) inc'd time  -DB       Row Name 12/21/23 1642          Sit-Stand Transfer    Sit-Stand [place] (Transfers) moderate assist (50% patient effort);2 person assist;verbal cues;nonverbal cues (demo/gesture)  -DB     Assistive Device (Sit-Stand Transfers) walker, front-wheeled  -DB       Row Name 12/21/23 1642          Gait/Stairs (Locomotion)    [place] Level (Gait) minimum assist (75% patient effort);2 person assist;verbal cues;nonverbal cues (demo/gesture)  -DB     Assistive Device (Gait) walker, front-wheeled  -DB     Deviations/Abnormal Patterns (Gait) ranjit decreased;stride length decreased  -DB     Bilateral Gait Deviations forward flexed posture;heel strike decreased  -DB               User Key  (r) = Recorded By, (t) = Taken By, (c) = Cosigned By      Initials Name Provider Type    DB Felpia Weathers PT Physical Therapist                   Obj/Interventions       Row Name 12/21/23 1700          Strength Comprehensive (MMT)    Comment, General Manual Muscle Testing (MMT) Assessment generalized weakness  -DB       Row Name 12/21/23 1700          Balance    Balance Assessment sitting static balance;sitting dynamic balance;standing static balance;standing dynamic balance  -DB     Static Sitting Balance standby assist  -DB     Dynamic Sitting Balance standby assist  -DB     Position, Sitting Balance unsupported;sitting edge of bed  -DB     Static Standing Balance contact guard  -DB     Dynamic Standing Balance minimal assist;2-person assist  -DB      Position/Device Used, Standing Balance supported  -DB     Balance Interventions sitting;standing;sit to stand  -DB               User Key  (r) = Recorded By, (t) = Taken By, (c) = Cosigned By      Initials Name Provider Type    DB Felipa Weathers, PT Physical Therapist                   Goals/Plan       Row Name 12/21/23 1712          Bed Mobility Goal 1 (PT)    Activity/Assistive Device (Bed Mobility Goal 1, PT) bed mobility activities, all  -DB     [place] Level/Cues Needed (Bed Mobility Goal 1, PT) supervision required  -DB     Time Frame (Bed Mobility Goal 1, PT) 1 week  -DB       Row Name 12/21/23 1712          Transfer Goal 1 (PT)    Activity/Assistive Device (Transfer Goal 1, PT) transfers, all  -DB     [place] Level/Cues Needed (Transfer Goal 1, PT) supervision required  -DB     Time Frame (Transfer Goal 1, PT) 1 week  -DB       Row Name 12/21/23 1712          Gait Training Goal 1 (PT)    Activity/Assistive Device (Gait Training Goal 1, PT) gait (walking locomotion)  -DB     [place] Level (Gait Training Goal 1, PT) supervision required  -DB     Time Frame (Gait Training Goal 1, PT) 1 week  -DB       Row Name 12/21/23 1712          Therapy Assessment/Plan (PT)    Planned Therapy Interventions (PT) balance training;bed mobility training;gait training;home exercise program;neuromuscular re-education;patient/family education;strengthening;ROM (range of motion);stair training;postural re-education;transfer training  -DB               User Key  (r) = Recorded By, (t) = Taken By, (c) = Cosigned By      Initials Name Provider Type    Felipa Chang, PT Physical Therapist                   Clinical Impression       Row Name 12/21/23 1710          Pain    Pain Intervention(s) Ambulation/increased activity;Repositioned  -DB       Row Name 12/21/23 1710          Plan of Care Review    Plan of Care Reviewed With patient  -DB     Outcome Evaluation Patient is a 78 y.o. male admitted to [facility] for fall,  immobility, and inc'd weakness on 12/20/2023. PMHx includes ankylosing spondylitis, HTN. Patient is (I) at baseline and lives alone. He denies VIRGIE or stairs and uses a cane at baseline. Today, patient performed bed mobility with modA, required modAx2 for transfers, and ambulated 5' with use of RW and Chuckie x2. Strength, balance, and activity tolerance deficits noted. Patient may benefit from skilled PT services acutely to address functional deficits as well as improve level of independence prior to discharge. Anticipate SNF upon DC.  -DB       Row Name 12/21/23 1710          Therapy Assessment/Plan (PT)    Rehab Potential (PT) good, to achieve stated therapy goals  -DB     Criteria for Skilled Interventions Met (PT) yes  -DB     Therapy Frequency (PT) 6 times/wk  -DB       Row Name 12/21/23 1710          Vital Signs    O2 Delivery Pre Treatment room air  -DB     O2 Delivery Intra Treatment room air  -DB     O2 Delivery Post Treatment room air  -DB     Pre Patient Position Supine  -DB     Intra Patient Position Standing  -DB     Post Patient Position Sitting  -DB       Row Name 12/21/23 1710          Positioning and Restraints    Pre-Treatment Position in bed  -DB     Post Treatment Position chair  -DB     In Chair sitting;notified nsg;call light within reach;encouraged to call for assist;exit alarm on  -DB               User Key  (r) = Recorded By, (t) = Taken By, (c) = Cosigned By      Initials Name Provider Type    Felipa Chang, PT Physical Therapist                   Outcome Measures       Row Name 12/21/23 1713 12/21/23 0824       How much help from another person do you currently need...    Turning from your back to your side while in flat bed without using bedrails? 3  -DB 3  -BC    Moving from lying on back to sitting on the side of a flat bed without bedrails? 2  -DB 3  -BC    Moving to and from a bed to a chair (including a wheelchair)? 2  -DB 2  -BC    Standing up from a chair using your arms (e.g.,  wheelchair, bedside chair)? 2  -DB 2  -BC    Climbing 3-5 steps with a railing? 1  -DB 1  -BC    To walk in hospital room? 2  -DB 2  -BC    AM-PAC 6 Clicks Score (PT) 12  -DB 13  -BC    Highest Level of Mobility Goal 4 --> Transfer to chair/commode  -DB 4 --> Transfer to chair/commode  -BC      Row Name 12/21/23 1713          Functional Assessment    Outcome Measure Options AM-PAC 6 Clicks Basic Mobility (PT)  -DB               User Key  (r) = Recorded By, (t) = Taken By, (c) = Cosigned By      Initials Name Provider Type    DB Felipa Weathers, PT Physical Therapist    Veronika Goff RN Registered Nurse                                 Physical Therapy Education       Title: PT OT SLP Therapies (Done)       Topic: Physical Therapy (Done)       Point: Mobility training (Done)       Learning Progress Summary             Patient Acceptance, E, VU by DB at 12/21/2023 1713                         Point: Home exercise program (Done)       Learning Progress Summary             Patient Acceptance, E, VU by DB at 12/21/2023 1713                         Point: Body mechanics (Done)       Learning Progress Summary             Patient Acceptance, E, VU by DB at 12/21/2023 1713                         Point: Precautions (Done)       Learning Progress Summary             Patient Acceptance, E, VU by DB at 12/21/2023 1713                                         User Key       Initials Effective Dates Name Provider Type Discipline    DB 06/16/21 -  Felipa Weathers, PT Physical Therapist PT                  PT Recommendation and Plan  Planned Therapy Interventions (PT): balance training, bed mobility training, gait training, home exercise program, neuromuscular re-education, patient/family education, strengthening, ROM (range of motion), stair training, postural re-education, transfer training  Plan of Care Reviewed With: patient  Outcome Evaluation: Patient is a 78 y.o. male admitted to [facility] for fall, immobility, and inc'd  weakness on 12/20/2023. PMHx includes ankylosing spondylitis, HTN. Patient is (I) at baseline and lives alone. He denies VIRGIE or stairs and uses a cane at baseline. Today, patient performed bed mobility with modA, required modAx2 for transfers, and ambulated 5' with use of RW and Chuckie x2. Strength, balance, and activity tolerance deficits noted. Patient may benefit from skilled PT services acutely to address functional deficits as well as improve level of independence prior to discharge. Anticipate SNF upon DC.     Time Calculation:         PT Charges       Row Name 12/21/23 1717             Time Calculation    Start Time 1526  -DB      Stop Time 1544  -DB      Time Calculation (min) 18 min  -DB      PT Received On 12/21/23  -DB      PT - Next Appointment 12/22/23  -DB      PT Goal Re-Cert Due Date 12/28/23  -DB         Time Calculation- PT    Total Timed Code Minutes- PT 0 minute(s)  -DB                User Key  (r) = Recorded By, (t) = Taken By, (c) = Cosigned By      Initials Name Provider Type    DB Felipa Weathers, PT Physical Therapist                  Therapy Charges for Today       Code Description Service Date Service Provider Modifiers Qty    74958840669 HC PT EVAL MOD COMPLEXITY 4 12/21/2023 Felipa Weathers, PT GP 1            PT G-Codes  Outcome Measure Options: AM-PAC 6 Clicks Basic Mobility (PT)  AM-PAC 6 Clicks Score (PT): 12  PT Discharge Summary  Anticipated Discharge Disposition (PT): skilled nursing facility    Felipa Weathers PT  12/21/2023      Electronically signed by Felipa Weathers PT at 12/21/23 1717          Occupational Therapy Notes (last 48 hours)        Sarah Beck, OT at 12/22/23 0362          The pt was admitted to [facility] 2/2 to weakness and falls. Pmhx significant for ankylosing spondylitis and HTN. The pt recently lost his wife and was living alone. His family is assisting him with his move from [place] to [place]. Today, the pt was fairly weak and demonstrated poor  endurance. Min-Mod A x2 to stand and mobilize a short distance with a walker. Max A provided on LBD (shoes). He completed BUE/BLE exercises at the EOB with a posterior sitting balance lean. Mod A provided to assist back to supine. SNF recommended.     Electronically signed by Sarah Beck, OT at 23 1537       Sarah Beck, OT at 23 1534          Patient Name: Anthony Gallegos  : 1945    MRN: [id number]                              Today's Date: 2023       Admit Date: 2023    Visit Dx:     ICD-10-CM ICD-9-CM   1. Muscle pain  M79.10 729.1   2. Dizziness  R42 780.4   3. Immobility  Z74.09 799.89   4. Fall, initial encounter  W19.XXXA E888.9   5. Elevated CK  R74.8 790.5   6. Arthralgia, unspecified joint  M25.50 719.40     Patient Active Problem List   Diagnosis    Ankylosing spondylitis of cervical region    Recent unexplained weight loss    Abnormal serum lipase level    Abnormal serum level of amylase    Hypertension    Boil    Immobility    Fall from bed    Tetrahydrocannabinol (THC) use disorder, mild, abuse    Generalized pain        Grief    Urine retention    Constipation    DISH (disseminated idiopathic skeletal hyperostosis)    Generalized weakness     Past Medical History:   Diagnosis Date    Abscess of scrotum     Arthritis     AS (ankylosing spondylitis)     Hypertension     Tetrahydrocannabinol (THC) use disorder, mild, abuse 2023    Uveitis      Past Surgical History:   Procedure Laterality Date    COLONOSCOPY      ROTATOR CUFF REPAIR Right     TOTAL HIP ARTHROPLASTY Left       General Information       Row Name 23 1529          OT Time and Intention    Document Type evaluation  -RB     Mode of Treatment occupational therapy;individual therapy  -RB       Row Name 23 1529          General Information    Patient Profile Reviewed yes  -RB     Prior Level of Function independent:;ADL's;transfer  -RB     Existing Precautions/Restrictions  fall  -RB     Barriers to Rehab none identified  -RB       Row Name 12/22/23 1529          Living Environment    People in Home alone  -RB       Row Name 12/22/23 1529          Home Main Entrance    Number of Stairs, Main Entrance none  -RB       Row Name 12/22/23 1529          Stairs Within Home, Primary    Number of Stairs, Within Home, Primary none  -RB       Row Name 12/22/23 1529          Cognition    Orientation Status (Cognition) oriented x 4  -RB       Row Name 12/22/23 1529          Safety Issues, Functional Mobility    Safety Issues Affecting Function (Mobility) safety precautions follow-through/compliance;safety precaution awareness  -RB     Impairments Affecting Function (Mobility) balance;endurance/activity tolerance;postural/trunk control;range of motion (ROM);strength;cognition  -RB               User Key  (r) = Recorded By, (t) = Taken By, (c) = Cosigned By      Initials Name Provider Type    RB Sarah Beck OT Occupational Therapist                     Mobility/ADL's       Row Name 12/22/23 1530          Bed Mobility    Bed Mobility sit-supine  -RB     Sit-Supine [place] (Bed Mobility) minimum assist (75% patient effort);moderate assist (50% patient effort);verbal cues  -RB     Bed Mobility, Safety Issues impaired trunk control for bed mobility  -RB     Assistive Device (Bed Mobility) bed rails  -RB       Row Name 12/22/23 1530          Transfers    Transfers sit-stand transfer;stand-sit transfer;bed-chair transfer  -RB       Row Name 12/22/23 1530          Bed-Chair Transfer    Bed-Chair [place] (Transfers) minimum assist (75% patient effort);2 person assist;verbal cues;contact guard  -RB     Assistive Device (Bed-Chair Transfers) walker, front-wheeled  -RB       Row Name 12/22/23 1530          Sit-Stand Transfer    Sit-Stand [place] (Transfers) minimum assist (75% patient effort);contact guard;verbal cues;2 person assist;moderate assist (50% patient effort)  -RB     Assistive  Device (Sit-Stand Transfers) walker, front-wheeled  -RB       Row Name 12/22/23 1530          Stand-Sit Transfer    Stand-Sit [place] (Transfers) moderate assist (50% patient effort);2 person assist;verbal cues  -RB     Assistive Device (Stand-Sit Transfers) walker, front-wheeled  -RB       Row Name 12/22/23 1530          Functional Mobility    Functional Mobility- Ind. Level contact guard assist;minimum assist (75% patient effort);1 person + 1 person to manage equipment;verbal cues required  -RB     Functional Mobility- Device walker, front-wheeled  -RB     Functional Mobility- Safety Issues balance decreased during turns;sequencing ability decreased;step length decreased  -RB       Row Name 12/22/23 1530          Activities of Daily Living    BADL Assessment/Intervention lower body dressing  -RB       Row Name 12/22/23 1530          Lower Body Dressing Assessment/Training    [place] Level (Lower Body Dressing) lower body dressing skills;maximum assist (25% patient effort);shoes/slippers  -RB     Position (Lower Body Dressing) edge of bed sitting  -RB               User Key  (r) = Recorded By, (t) = Taken By, (c) = Cosigned By      Initials Name Provider Type    RB Sarah Beck OT Occupational Therapist                   Obj/Interventions       Row Name 12/22/23 1531          Sensory Assessment (Somatosensory)    Sensory Assessment (Somatosensory) sensation intact  -RB       Row Name 12/22/23 1531          Vision Assessment/Intervention    Visual Impairment/Limitations WFL  -RB       Row Name 12/22/23 1531          Range of Motion Comprehensive    Comment, General Range of Motion Poor knee extension, BUE limited ~50% proximally  -RB       Row Name 12/22/23 1531          Strength Comprehensive (MMT)    Comment, General Manual Muscle Testing (MMT) Assessment General weakness in arms and legs. Arms fatigue after ~5 reps of each exercise  -RB       Row Name 12/22/23 1531          Shoulder (Therapeutic  Exercise)    Shoulder (Therapeutic Exercise) AAROM (active assistive range of motion)  -RB     Shoulder AAROM (Therapeutic Exercise) bilateral;flexion;extension;external rotation;aBduction;aDduction;internal rotation;sitting  posterior lean  -RB       Row Name 12/22/23 1531          Elbow/Forearm (Therapeutic Exercise)    Elbow/Forearm (Therapeutic Exercise) AROM (active range of motion)  -RB     Elbow/Forearm AROM (Therapeutic Exercise) bilateral;extension;flexion;10 repetitions;sitting  -RB       Row Name 12/22/23 1531          Motor Skills    Therapeutic Exercise shoulder;elbow/forearm  -RB       Row Name 12/22/23 1531          Balance    Comment, Balance SBA/CGA sitting balance during exercises at the EOB. posterior lean. CGA-Mod A for standing balance + walker  -RB               User Key  (r) = Recorded By, (t) = Taken By, (c) = Cosigned By      Initials Name Provider Type    RB Sarah Beck OT Occupational Therapist                   Goals/Plan       Row Name 12/22/23 1533          Bed Mobility Goal 1 (OT)    Activity/Assistive Device (Bed Mobility Goal 1, OT) bed mobility activities, all  -RB     [place] Level/Cues Needed (Bed Mobility Goal 1, OT) supervision required  -RB     Time Frame (Bed Mobility Goal 1, OT) short term goal (STG);2 weeks  -RB     Progress/Outcomes (Bed Mobility Goal 1, OT) continuing progress toward goal  -RB       Row Name 12/22/23 1533          Transfer Goal 1 (OT)    Activity/Assistive Device (Transfer Goal 1, OT) transfers, all  -RB     [place] Level/Cues Needed (Transfer Goal 1, OT) supervision required  -RB     Time Frame (Transfer Goal 1, OT) short term goal (STG);2 weeks  -RB     Progress/Outcome (Transfer Goal 1, OT) continuing progress toward goal  -RB       Row Name 12/22/23 1533          Dressing Goal 1 (OT)    Activity/Device (Dressing Goal 1, OT) dressing skills, all  -RB     [place]/Cues Needed (Dressing Goal 1, OT) supervision required  -RB     Time  Frame (Dressing Goal 1, OT) short term goal (STG);2 weeks  -RB     Progress/Outcome (Dressing Goal 1, OT) continuing progress toward goal  -RB       Row Name 12/22/23 1533          Toileting Goal 1 (OT)    Activity/Device (Toileting Goal 1, OT) toileting skills, all  -RB     [place] Level/Cues Needed (Toileting Goal 1, OT) supervision required  -RB     Time Frame (Toileting Goal 1, OT) short term goal (STG);2 weeks  -RB     Progress/Outcome (Toileting Goal 1, OT) continuing progress toward goal  -RB       Row Name 12/22/23 1533          Grooming Goal 1 (OT)    Activity/Device (Grooming Goal 1, OT) grooming skills, all  -RB     [place] (Grooming Goal 1, OT) supervision required  -RB     Time Frame (Grooming Goal 1, OT) short term goal (STG);2 weeks  -RB     Progress/Outcome (Grooming Goal 1, OT) continuing progress toward goal  -RB       Row Name 12/22/23 1533          Therapy Assessment/Plan (OT)    Planned Therapy Interventions (OT) transfer/mobility retraining;strengthening exercise;ROM/therapeutic exercise;activity tolerance training;adaptive equipment training;BADL retraining;occupation/activity based interventions;functional balance retraining;patient/caregiver education/training;neuromuscular control/coordination retraining;cognitive/visual perception retraining  -RB               User Key  (r) = Recorded By, (t) = Taken By, (c) = Cosigned By      Initials Name Provider Type    RB Sarah Beck, OT Occupational Therapist                   Clinical Impression       Row Name 12/22/23 1533          Pain Assessment    Pretreatment Pain Rating 0/10 - no pain  -RB     Posttreatment Pain Rating 0/10 - no pain  -RB     Pain Intervention(s) Repositioned  -RB       Row Name 12/22/23 1533          Plan of Care Review    Plan of Care Reviewed With patient  -RB     Progress no change  -RB     Outcome Evaluation ent is a 78 y.o. male admitted to [facility] for fall, immobility, and inc'd weakness on 12/20/2023. PMHx  includes ankylosing spondylitis, HTN. Patient is (I) at baseline and lives alone. He denies VIRGIE or stairs and uses a cane at baseline. Today, patient performed bed mobility with modA, required modAx2 for transfers, and ambulated 5' with use of RW and Chuckie x2. Strength, balance, and activity tolerance deficits noted. Patient may benefit from skilled PT services acutely to address functional deficits as well as improve level of independence prior to discharge. Anticipate SNF upon DC.  -RB       Row Name 12/22/23 1533          Therapy Assessment/Plan (OT)    Rehab Potential (OT) good, to achieve stated therapy goals  -RB     Criteria for Skilled Therapeutic Interventions Met (OT) yes;skilled treatment is necessary  -RB     Therapy Frequency (OT) 5 times/wk  -RB       Row Name 12/22/23 1533          Therapy Plan Review/Discharge Plan (OT)    Anticipated Discharge Disposition (OT) skilled nursing facility  -RB       Row Name 12/22/23 1533          Vital Signs    O2 Delivery Pre Treatment room air  -RB     O2 Delivery Intra Treatment room air  -RB     O2 Delivery Post Treatment room air  -RB     Pre Patient Position Supine  -RB     Intra Patient Position Standing  -RB     Post Patient Position Sitting  -RB       Row Name 12/22/23 1538          Positioning and Restraints    Pre-Treatment Position sitting in chair/recliner  -RB     Post Treatment Position bed  -RB     In Bed notified nsg;supine;fowlers;call light within reach;encouraged to call for assist;exit alarm on;with family/caregiver  -RB               User Key  (r) = Recorded By, (t) = Taken By, (c) = Cosigned By      Initials Name Provider Type    RB Sarah Beck, OT Occupational Therapist                   Outcome Measures       Row Name 12/22/23 1533          How much help from another is currently needed...    Putting on and taking off regular lower body clothing? 2  -RB     Bathing (including washing, rinsing, and drying) 2  -RB     Toileting (which  includes using toilet bed pan or urinal) 2  -RB     Putting on and taking off regular upper body clothing 2  -RB     Taking care of personal grooming (such as brushing teeth) 3  -RB     Eating meals 3  -RB     AM-PAC 6 Clicks Score (OT) 14  -RB       Row Name 12/22/23 1534          Modified [place] Scale    Modified [place] Scale 4 - Moderately severe disability.  Unable to walk without assistance, and unable to attend to own bodily needs without assistance.  -RB       Row Name 12/22/23 1534          Functional Assessment    Outcome Measure Options AM-PAC 6 Clicks Daily Activity (OT);Modified [place]  -RB               User Key  (r) = Recorded By, (t) = Taken By, (c) = Cosigned By      Initials Name Provider Type    Sarah Carrillo OT Occupational Therapist                    Occupational Therapy Education       Title: PT OT SLP Therapies (In Progress)       Topic: Occupational Therapy (Not Started)       Point: ADL training (Not Started)       Description:   Instruct learner(s) on proper safety adaptation and remediation techniques during self care or transfers.   Instruct in proper use of assistive devices.                  Learner Progress:  Not documented in this visit.              Point: Home exercise program (Not Started)       Description:   Instruct learner(s) on appropriate technique for monitoring, assisting and/or progressing therapeutic exercises/activities.                  Learner Progress:  Not documented in this visit.              Point: Precautions (Not Started)       Description:   Instruct learner(s) on prescribed precautions during self-care and functional transfers.                  Learner Progress:  Not documented in this visit.              Point: Body mechanics (Not Started)       Description:   Instruct learner(s) on proper positioning and spine alignment during self-care, functional mobility activities and/or exercises.                  Learner Progress:  Not documented in this visit.                                   OT Recommendation and Plan  Planned Therapy Interventions (OT): transfer/mobility retraining, strengthening exercise, ROM/therapeutic exercise, activity tolerance training, adaptive equipment training, BADL retraining, occupation/activity based interventions, functional balance retraining, patient/caregiver education/training, neuromuscular control/coordination retraining, cognitive/visual perception retraining  Therapy Frequency (OT): 5 times/wk  Plan of Care Review  Plan of Care Reviewed With: patient  Progress: no change  Outcome Evaluation: ent is a 78 y.o. male admitted to [facility] for fall, immobility, and inc'd weakness on 12/20/2023. PMHx includes ankylosing spondylitis, HTN. Patient is (I) at baseline and lives alone. He denies VIRGIE or stairs and uses a cane at baseline. Today, patient performed bed mobility with modA, required modAx2 for transfers, and ambulated 5' with use of RW and Chuckie x2. Strength, balance, and activity tolerance deficits noted. Patient may benefit from skilled PT services acutely to address functional deficits as well as improve level of independence prior to discharge. Anticipate SNF upon DC.     Time Calculation:   Evaluation Complexity (OT)  Review Occupational Profile/Medical/Therapy History Complexity: expanded/moderate complexity  Assessment, Occupational Performance/Identification of Deficit Complexity: 3-5 performance deficits  Clinical Decision Making Complexity (OT): detailed assessment/moderate complexity  Overall Complexity of Evaluation (OT): moderate complexity     Time Calculation- OT       Row Name 12/22/23 1525             Time Calculation- OT    OT Start Time 1455  -RB      OT Stop Time 1520  -RB      OT Time Calculation (min) 25 min  -RB      Total Timed Code Minutes- OT 8 minute(s)  -RB      OT Received On 12/22/23  -RB      OT - Next Appointment 12/26/23  -      OT Goal Re-Cert Due Date 01/05/24  -RB         Timed Charges    17492 - OT  Therapeutic Exercise Minutes 8  -RB         Untimed Charges    OT Eval/Re-eval Minutes 17  -RB         Total Minutes    Timed Charges Total Minutes 8  -RB      Untimed Charges Total Minutes 17  -RB       Total Minutes 25  -RB                User Key  (r) = Recorded By, (t) = Taken By, (c) = Cosigned By      Initials Name Provider Type    RB Sarah Beck OT Occupational Therapist                  Therapy Charges for Today       Code Description Service Date Service Provider Modifiers Qty    [id number]  OT THER PROC EA 15 MIN 12/22/2023 Sarah Beck OT GO 1    [id number] HC OT EVAL MOD COMPLEXITY 2 12/22/2023 Sarah Beck OT GO 1                 Sarah Beck OT  12/22/2023    Electronically signed by Sarah Beck OT at 12/22/23 1535       John Deal OT at 12/21/23 1509          attempt x 2 today for OT eval. Will check back tomorrow     Electronically signed by John Deal OT at 12/21/23 1510       Speech Language Pathology Notes (last 48 hours)  Notes from 12/20/23 1637 through 12/22/23 1637   No notes exist for this encounter.

## 2023-12-22 NOTE — PROGRESS NOTES
"Nutrition Services    Patient Name:  Anthony Gallegos  YOB: 1945  MRN: [id number]  Admit Date:  12/20/2023    Assessment Date:  12/22/23    Summary: Follow up     Nutrition follow up completed. Visited pt at bedside. He endorses fine appetite. Eating lunch at time of visit. Assisted pt with moving lunch tray. NFPE performed, severe malnutrition.   Meds: pericolace   Labs: Creat 0.62  Skin: stage 1 left upper back and bilateral coccyx pressure injuries, skin tear    Pt meets ASPEN/AND criteria for nutrition dx of severe malnutrition of chronic disease related to muscle wasting, fat loss, fluid accumulation, and declining functional status.    REC:  Continue Ensure Compact vanilla BID B/D for additional calories/protein and to support PO intake, wt gn, and wound healing    Will CTM PO/ONS intake     RD to follow per protocol.    CLINICAL NUTRITION ASSESSMENT      Reason for Assessment Follow up protocol      Diagnosis/Problem   Immobility    Medical/Surgical History Past Medical History:   Diagnosis Date    Abscess of scrotum     Arthritis     AS (ankylosing spondylitis)     Hypertension     Tetrahydrocannabinol (THC) use disorder, mild, abuse 12/20/2023    Uveitis        Past Surgical History:   Procedure Laterality Date    COLONOSCOPY      ROTATOR CUFF REPAIR Right     TOTAL HIP ARTHROPLASTY Left 2014        Anthropometrics        Current Height  Current Weight  BMI kg/m2 Height: 190.5 cm (75\")  Weight: 81.8 kg (180 lb 5.4 oz) (12/22/23 0430)  Body mass index is 22.54 kg/m².   Adjusted BMI (if applicable)    BMI Category Normal/Healthy (18.4 - 24.9)   Ideal Body Weight (IBW) 84.5 kg    Usual Body Weight (UBW) 220 lbs    Weight Trend Loss 41 lbs wt loss x 5 yrs    Weight History Wt Readings from Last 30 Encounters:   12/22/23 0430 81.8 kg (180 lb 5.4 oz)   12/20/23 1005 81.9 kg (180 lb 8 oz)   03/27/18 1439 100 kg (221 lb)   03/22/18 1613 101 kg (222 lb 12.8 oz)   01/23/18 1414 101 kg (223 lb) " "  12/26/17 1111 99.8 kg (220 lb)   10/19/17 1239 95.3 kg (210 lb)   08/30/17 1459 91.3 kg (201 lb 3.2 oz)   08/29/17 1221 93 kg (205 lb)   08/10/17 1517 88 kg (194 lb)   07/14/17 1041 85.9 kg (189 lb 6.4 oz)   06/29/17 1318 84.5 kg (186 lb 3.2 oz)   06/21/17 1922 90.7 kg (200 lb)        Estimated Requirements         Weight used  81.9 kg     Calories  4220-1859 (25 kcal/kg, 30 kcal/kg)    Protein  82-98 (1.0 - 1.2 gm/kg)    Fluid  1 mL/kcal      Labs       Pertinent Labs    Results from last 7 days   Lab Units 12/22/23  0602 12/21/23  1720 12/21/23  0732 12/20/23  1029   SODIUM mmol/L 139  --  137 138   POTASSIUM mmol/L 4.1 4.6 3.5 3.8   CHLORIDE mmol/L 106  --  103 98   CO2 mmol/L 24.7  --  25.9 28.0   BUN mg/dL 15  --  13 18   CREATININE mg/dL 0.62*  --  0.80 1.00   CALCIUM mg/dL 8.0*  --  7.9* 9.1   BILIRUBIN mg/dL 0.3  --  0.3 0.4   ALK PHOS U/L 34*  --  35* 47   ALT (SGPT) U/L 18  --  11 12   AST (SGOT) U/L 46*  --  45* 35   GLUCOSE mg/dL 98  --  87 99     Results from last 7 days   Lab Units 12/22/23  0602 12/21/23  0732 12/20/23  1029   MAGNESIUM mg/dL  --  2.1 1.7   HEMOGLOBIN g/dL 11.6* 11.0* 13.0   HEMATOCRIT % 35.0* 33.3* 39.3   WBC 10*3/mm3 3.91 3.74 4.25   ALBUMIN g/dL 2.9* 2.9* 3.8     Results from last 7 days   Lab Units 12/22/23  0602 12/21/23  0732 12/20/23  1029   INR   --   --  1.20*   PLATELETS 10*3/mm3 152 149 171     No results found for: \"COVID19\"  No results found for: \"HGBA1C\"       Medications           Scheduled Medications acetaminophen, 1,000 mg, Oral, TID  atenolol, 50 mg, Oral, Q24H  Lidocaine, 2 patch, Transdermal, Q24H  methotrexate, 2.5 mg, Oral, Once per day on Wed Thu  predniSONE, 40 mg, Oral, Daily With Breakfast  senna-docusate sodium, 2 tablet, Oral, BID  sodium chloride, 10 mL, Intravenous, Q12H  tamsulosin, 0.4 mg, Oral, Nightly       Infusions       PRN Medications   acetaminophen **OR** acetaminophen **OR** acetaminophen    senna-docusate sodium **AND** polyethylene glycol " **AND** bisacodyl **AND** bisacodyl    calcium carbonate    Calcium Replacement - Follow Nurse / BPA Driven Protocol    Magnesium Standard Dose Replacement - Follow Nurse / BPA Driven Protocol    melatonin    muscle rub    ondansetron ODT **OR** ondansetron    Phosphorus Replacement - Follow Nurse / BPA Driven Protocol    Potassium Replacement - Follow Nurse / BPA Driven Protocol    [COMPLETED] Insert Peripheral IV **AND** sodium chloride    sodium chloride    sodium chloride    traMADol     Physical Findings          General Findings alert, room air   Oral/Mouth Cavity tooth or teeth missing   Edema  lower extremity , 3+ (moderate)   Gastrointestinal constipation, last bowel movement: 12/18   Skin  pressure injury: left upper back stage 1, bilateral coccyx stage 1 , skin tear   Tubes/Drains/Lines none   NFPE See Malnutrition Severity Assessment, Date Completed: 12/22     Malnutrition Severity Assessment      Patient meets criteria for : (P) Severe Malnutrition (Pt meets ASPEN/AND criteria for nutrition dx of severe malnutrition of chronic disease related to muscle wasting, fat loss, fluid accumulation, and declining functional status.)  Malnutrition Type (last 8 hours)       Malnutrition Severity Assessment       Row Name 12/22/23 1337       Malnutrition Severity Assessment    Malnutrition Type Chronic Disease - Related Malnutrition (P)       Row Name 12/22/23 1337       Insufficient Energy Intake     Insufficient Energy Intake Findings -- (P)   41 lbs x 5 yrs      Row Name 12/22/23 1337       Muscle Loss    Loss of Muscle Mass Findings Severe (P)     [place] Region Severe - deep hollowing/scooping, lack of muscle to touch, facial bones well defined (P)     Clavicle Bone Region Severe - protruding prominent bone (P)       Row Name 12/22/23 1337       Fat Loss    Subcutaneous Fat Loss Findings Severe (P)     Orbital Region  Severe - pronounced hollowness/depression, dark circles, loose saggy skin (P)     Upper Arm  Region Severe - mostly skin, very little space between folds, fingers touch (P)       Row Name 12/22/23 2573       Fluid Accumulation (Edema)    Fluid Acumulation Findings Severe (P)     Fluid Accumulation  Severe equals 3+ or 4+ pitting edema (P)       Row Name 12/22/23 9946       Declining Functional Status    Declining Functional Status Findings Measurably Reduced (P)       Row Name 12/22/23 5627       Criteria Met (Must meet criteria for severity in at least 2 of these categories: M Wasting, Fat Loss, Fluid, Secondary Signs, Wt. Status, Intake)    Patient meets criteria for  Severe Malnutrition (P)   Pt meets ASPEN/AND criteria for nutrition dx of severe malnutrition of chronic disease related to muscle wasting, fat loss, fluid accumulation, and declining functional status.                     Current Nutrition Orders & Evaluation of Intake       Oral Nutrition     Food Allergies NKFA   Current PO Diet Diet: Cardiac Diets; Healthy Heart (2-3 Na+); Texture: Regular Texture (IDDSI 7); Fluid Consistency: Thin (IDDSI 0)   Supplement Ensure Compact vanilla BID B/D   PO Evaluation     % PO Intake Not yet documented     Factors Affecting Intake: emotional status, pain issues, weakness   --  PES STATEMENT / NUTRITION DIAGNOSIS      Nutrition Dx Problem  Problem: Unintentional Weight Loss  Etiology: Medical Diagnosis - immobility, , grief and Factors Affecting Nutrition -  emotional status, weakness, pain issues     Signs/Symptoms: Unintended Weight Change     NUTRITION INTERVENTION / PLAN OF CARE      Intervention Goal(s) Maintain nutrition status, Reduce/improve symptoms, Meet estimated needs, Disease management/therapy, Establish PO intake, Increase intake, Maintain weight, No significant weight loss, and PO intake goal %: 75%         RD Intervention/Action Encourage intake, Continue to monitor, and Care plan reviewed   --      Prescription/Orders:       PO Diet       Supplements Ensure Compact vanilla BID B/D       Enteral Nutrition       Parenteral Nutrition    New Prescription Ordered? Continue same per protocol, No changes at this time   --      Monitor/Evaluation Per protocol   Discharge Plan/Needs Pending clinical course   --    RD to follow per protocol.      Electronically signed by:  Rizwana Lugo RD  12/22/23 13:32 EST

## 2023-12-22 NOTE — PLAN OF CARE
The pt was admitted to [facility] 2/2 to weakness and falls. Pmhx significant for ankylosing spondylitis and HTN. The pt recently lost his wife and was living alone. His family is assisting him with his move from [place] to [place]. Today, the pt was fairly weak and demonstrated poor endurance. Min-Mod A x2 to stand and mobilize a short distance with a walker. Max A provided on LBD (shoes). He completed BUE/BLE exercises at the EOB with a posterior sitting balance lean. Mod A provided to assist back to supine. SNF recommended.

## 2023-12-22 NOTE — THERAPY EVALUATION
Patient Name: Anthony Gallegos  : 1945    MRN: [id number]                              Today's Date: 2023       Admit Date: 2023    Visit Dx:     ICD-10-CM ICD-9-CM   1. Muscle pain  M79.10 729.1   2. Dizziness  R42 780.4   3. Immobility  Z74.09 799.89   4. Fall, initial encounter  W19.XXXA E888.9   5. Elevated CK  R74.8 790.5   6. Arthralgia, unspecified joint  M25.50 719.40     Patient Active Problem List   Diagnosis    Ankylosing spondylitis of cervical region    Recent unexplained weight loss    Abnormal serum lipase level    Abnormal serum level of amylase    Hypertension    Boil    Immobility    Fall from bed    Tetrahydrocannabinol (THC) use disorder, mild, abuse    Generalized pain        Grief    Urine retention    Constipation    DISH (disseminated idiopathic skeletal hyperostosis)    Generalized weakness     Past Medical History:   Diagnosis Date    Abscess of scrotum     Arthritis     AS (ankylosing spondylitis)     Hypertension     Tetrahydrocannabinol (THC) use disorder, mild, abuse 2023    Uveitis      Past Surgical History:   Procedure Laterality Date    COLONOSCOPY      ROTATOR CUFF REPAIR Right     TOTAL HIP ARTHROPLASTY Left 2014      General Information       Row Name 23 1529          OT Time and Intention    Document Type evaluation  -RB     Mode of Treatment occupational therapy;individual therapy  -RB       Row Name 23 1529          General Information    Patient Profile Reviewed yes  -RB     Prior Level of Function independent:;ADL's;transfer  -RB     Existing Precautions/Restrictions fall  -RB     Barriers to Rehab none identified  -RB       Row Name 23 1529          Living Environment    People in Home alone  -RB       Row Name 23 1529          Home Main Entrance    Number of Stairs, Main Entrance none  -RB       Row Name 23 1529          Stairs Within Home, Primary    Number of Stairs, Within Home, Primary none  -RB       Row  Name 12/22/23 1529          Cognition    Orientation Status (Cognition) oriented x 4  -RB       Row Name 12/22/23 1529          Safety Issues, Functional Mobility    Safety Issues Affecting Function (Mobility) safety precautions follow-through/compliance;safety precaution awareness  -RB     Impairments Affecting Function (Mobility) balance;endurance/activity tolerance;postural/trunk control;range of motion (ROM);strength;cognition  -RB               User Key  (r) = Recorded By, (t) = Taken By, (c) = Cosigned By      Initials Name Provider Type    RB Sarah Beck OT Occupational Therapist                     Mobility/ADL's       Row Name 12/22/23 1530          Bed Mobility    Bed Mobility sit-supine  -RB     Sit-Supine [place] (Bed Mobility) minimum assist (75% patient effort);moderate assist (50% patient effort);verbal cues  -RB     Bed Mobility, Safety Issues impaired trunk control for bed mobility  -RB     Assistive Device (Bed Mobility) bed rails  -RB       Row Name 12/22/23 1530          Transfers    Transfers sit-stand transfer;stand-sit transfer;bed-chair transfer  -RB       Row Name 12/22/23 1530          Bed-Chair Transfer    Bed-Chair [place] (Transfers) minimum assist (75% patient effort);2 person assist;verbal cues;contact guard  -RB     Assistive Device (Bed-Chair Transfers) walker, front-wheeled  -RB       Row Name 12/22/23 1530          Sit-Stand Transfer    Sit-Stand [place] (Transfers) minimum assist (75% patient effort);contact guard;verbal cues;2 person assist;moderate assist (50% patient effort)  -RB     Assistive Device (Sit-Stand Transfers) walker, front-wheeled  -RB       Row Name 12/22/23 1530          Stand-Sit Transfer    Stand-Sit [place] (Transfers) moderate assist (50% patient effort);2 person assist;verbal cues  -RB     Assistive Device (Stand-Sit Transfers) walker, front-wheeled  -RB       Row Name 12/22/23 1530          Functional Mobility    Functional  Mobility- Ind. Level contact guard assist;minimum assist (75% patient effort);1 person + 1 person to manage equipment;verbal cues required  -RB     Functional Mobility- Device walker, front-wheeled  -RB     Functional Mobility- Safety Issues balance decreased during turns;sequencing ability decreased;step length decreased  -RB       Row Name 12/22/23 1530          Activities of Daily Living    BADL Assessment/Intervention lower body dressing  -RB       Row Name 12/22/23 1530          Lower Body Dressing Assessment/Training    [place] Level (Lower Body Dressing) lower body dressing skills;maximum assist (25% patient effort);shoes/slippers  -RB     Position (Lower Body Dressing) edge of bed sitting  -RB               User Key  (r) = Recorded By, (t) = Taken By, (c) = Cosigned By      Initials Name Provider Type    RB Sarah Beck OT Occupational Therapist                   Obj/Interventions       Row Name 12/22/23 1531          Sensory Assessment (Somatosensory)    Sensory Assessment (Somatosensory) sensation intact  -RB       Row Name 12/22/23 1531          Vision Assessment/Intervention    Visual Impairment/Limitations WFL  -RB       Row Name 12/22/23 1531          Range of Motion Comprehensive    Comment, General Range of Motion Poor knee extension, BUE limited ~50% proximally  -RB       Row Name 12/22/23 1531          Strength Comprehensive (MMT)    Comment, General Manual Muscle Testing (MMT) Assessment General weakness in arms and legs. Arms fatigue after ~5 reps of each exercise  -RB       Row Name 12/22/23 1531          Shoulder (Therapeutic Exercise)    Shoulder (Therapeutic Exercise) AAROM (active assistive range of motion)  -     Shoulder AAROM (Therapeutic Exercise) bilateral;flexion;extension;external rotation;aBduction;aDduction;internal rotation;sitting  posterior lean  -       Row Name 12/22/23 1531          Elbow/Forearm (Therapeutic Exercise)    Elbow/Forearm (Therapeutic Exercise)  AROM (active range of motion)  -RB     Elbow/Forearm AROM (Therapeutic Exercise) bilateral;extension;flexion;10 repetitions;sitting  -RB       Row Name 12/22/23 1531          Motor Skills    Therapeutic Exercise shoulder;elbow/forearm  -RB       Row Name 12/22/23 1531          Balance    Comment, Balance SBA/CGA sitting balance during exercises at the EOB. posterior lean. CGA-Mod A for standing balance + walker  -RB               User Key  (r) = Recorded By, (t) = Taken By, (c) = Cosigned By      Initials Name Provider Type    RB Sarah Beck, OT Occupational Therapist                   Goals/Plan       Row Name 12/22/23 1533          Bed Mobility Goal 1 (OT)    Activity/Assistive Device (Bed Mobility Goal 1, OT) bed mobility activities, all  -RB     [place] Level/Cues Needed (Bed Mobility Goal 1, OT) supervision required  -RB     Time Frame (Bed Mobility Goal 1, OT) short term goal (STG);2 weeks  -RB     Progress/Outcomes (Bed Mobility Goal 1, OT) continuing progress toward goal  -RB       Row Name 12/22/23 1533          Transfer Goal 1 (OT)    Activity/Assistive Device (Transfer Goal 1, OT) transfers, all  -RB     [place] Level/Cues Needed (Transfer Goal 1, OT) supervision required  -RB     Time Frame (Transfer Goal 1, OT) short term goal (STG);2 weeks  -RB     Progress/Outcome (Transfer Goal 1, OT) continuing progress toward goal  -RB       Row Name 12/22/23 1533          Dressing Goal 1 (OT)    Activity/Device (Dressing Goal 1, OT) dressing skills, all  -RB     [place]/Cues Needed (Dressing Goal 1, OT) supervision required  -RB     Time Frame (Dressing Goal 1, OT) short term goal (STG);2 weeks  -RB     Progress/Outcome (Dressing Goal 1, OT) continuing progress toward goal  -RB       Row Name 12/22/23 1533          Toileting Goal 1 (OT)    Activity/Device (Toileting Goal 1, OT) toileting skills, all  -RB     [place] Level/Cues Needed (Toileting Goal 1, OT) supervision required  -RB      Time Frame (Toileting Goal 1, OT) short term goal (STG);2 weeks  -RB     Progress/Outcome (Toileting Goal 1, OT) continuing progress toward goal  -RB       Row Name 12/22/23 1533          Grooming Goal 1 (OT)    Activity/Device (Grooming Goal 1, OT) grooming skills, all  -RB     [place] (Grooming Goal 1, OT) supervision required  -RB     Time Frame (Grooming Goal 1, OT) short term goal (STG);2 weeks  -RB     Progress/Outcome (Grooming Goal 1, OT) continuing progress toward goal  -RB       Row Name 12/22/23 1533          Therapy Assessment/Plan (OT)    Planned Therapy Interventions (OT) transfer/mobility retraining;strengthening exercise;ROM/therapeutic exercise;activity tolerance training;adaptive equipment training;BADL retraining;occupation/activity based interventions;functional balance retraining;patient/caregiver education/training;neuromuscular control/coordination retraining;cognitive/visual perception retraining  -RB               User Key  (r) = Recorded By, (t) = Taken By, (c) = Cosigned By      Initials Name Provider Type    RB Sarah Beck OT Occupational Therapist                   Clinical Impression       Row Name 12/22/23 1539          Pain Assessment    Pretreatment Pain Rating 0/10 - no pain  -RB     Posttreatment Pain Rating 0/10 - no pain  -RB     Pain Intervention(s) Repositioned  -RB       Row Name 12/22/23 1533          Plan of Care Review    Plan of Care Reviewed With patient  -RB     Progress no change  -RB     Outcome Evaluation ent is a 78 y.o. male admitted to [facility] for fall, immobility, and inc'd weakness on 12/20/2023. PMHx includes ankylosing spondylitis, HTN. Patient is (I) at baseline and lives alone. He denies VIRGIE or stairs and uses a cane at baseline. Today, patient performed bed mobility with modA, required modAx2 for transfers, and ambulated 5' with use of RW and Chuckie x2. Strength, balance, and activity tolerance deficits noted. Patient may benefit from skilled PT  services acutely to address functional deficits as well as improve level of independence prior to discharge. Anticipate SNF upon DC.  -RB       Row Name 12/22/23 1533          Therapy Assessment/Plan (OT)    Rehab Potential (OT) good, to achieve stated therapy goals  -RB     Criteria for Skilled Therapeutic Interventions Met (OT) yes;skilled treatment is necessary  -RB     Therapy Frequency (OT) 5 times/wk  -RB       Row Name 12/22/23 1533          Therapy Plan Review/Discharge Plan (OT)    Anticipated Discharge Disposition (OT) skilled nursing facility  -RB       Row Name 12/22/23 1533          Vital Signs    O2 Delivery Pre Treatment room air  -RB     O2 Delivery Intra Treatment room air  -RB     O2 Delivery Post Treatment room air  -RB     Pre Patient Position Supine  -RB     Intra Patient Position Standing  -RB     Post Patient Position Sitting  -RB       Row Name 12/22/23 1533          Positioning and Restraints    Pre-Treatment Position sitting in chair/recliner  -RB     Post Treatment Position bed  -RB     In Bed notified nsg;supine;fowlers;call light within reach;encouraged to call for assist;exit alarm on;with family/caregiver  -RB               User Key  (r) = Recorded By, (t) = Taken By, (c) = Cosigned By      Initials Name Provider Type    RB Sarah Beck, OT Occupational Therapist                   Outcome Measures       Row Name 12/22/23 4588          How much help from another is currently needed...    Putting on and taking off regular lower body clothing? 2  -RB     Bathing (including washing, rinsing, and drying) 2  -RB     Toileting (which includes using toilet bed pan or urinal) 2  -RB     Putting on and taking off regular upper body clothing 2  -RB     Taking care of personal grooming (such as brushing teeth) 3  -RB     Eating meals 3  -RB     AM-PAC 6 Clicks Score (OT) 14  -RB       Row Name 12/22/23 1537          Modified [place] Scale    Modified [place] Scale 4 - Moderately severe  disability.  Unable to walk without assistance, and unable to attend to own bodily needs without assistance.  -RB       Row Name 12/22/23 1534          Functional Assessment    Outcome Measure Options AM-PAC 6 Clicks Daily Activity (OT);Modified [place]  -RB               User Key  (r) = Recorded By, (t) = Taken By, (c) = Cosigned By      Initials Name Provider Type    Sarah Carrillo OT Occupational Therapist                    Occupational Therapy Education       Title: PT OT SLP Therapies (In Progress)       Topic: Occupational Therapy (Not Started)       Point: ADL training (Not Started)       Description:   Instruct learner(s) on proper safety adaptation and remediation techniques during self care or transfers.   Instruct in proper use of assistive devices.                  Learner Progress:  Not documented in this visit.              Point: Home exercise program (Not Started)       Description:   Instruct learner(s) on appropriate technique for monitoring, assisting and/or progressing therapeutic exercises/activities.                  Learner Progress:  Not documented in this visit.              Point: Precautions (Not Started)       Description:   Instruct learner(s) on prescribed precautions during self-care and functional transfers.                  Learner Progress:  Not documented in this visit.              Point: Body mechanics (Not Started)       Description:   Instruct learner(s) on proper positioning and spine alignment during self-care, functional mobility activities and/or exercises.                  Learner Progress:  Not documented in this visit.                                  OT Recommendation and Plan  Planned Therapy Interventions (OT): transfer/mobility retraining, strengthening exercise, ROM/therapeutic exercise, activity tolerance training, adaptive equipment training, BADL retraining, occupation/activity based interventions, functional balance retraining, patient/caregiver  education/training, neuromuscular control/coordination retraining, cognitive/visual perception retraining  Therapy Frequency (OT): 5 times/wk  Plan of Care Review  Plan of Care Reviewed With: patient  Progress: no change  Outcome Evaluation: ent is a 78 y.o. male admitted to [facility] for fall, immobility, and inc'd weakness on 12/20/2023. PMHx includes ankylosing spondylitis, HTN. Patient is (I) at baseline and lives alone. He denies VIRGIE or stairs and uses a cane at baseline. Today, patient performed bed mobility with modA, required modAx2 for transfers, and ambulated 5' with use of RW and Chuckie x2. Strength, balance, and activity tolerance deficits noted. Patient may benefit from skilled PT services acutely to address functional deficits as well as improve level of independence prior to discharge. Anticipate SNF upon DC.     Time Calculation:   Evaluation Complexity (OT)  Review Occupational Profile/Medical/Therapy History Complexity: expanded/moderate complexity  Assessment, Occupational Performance/Identification of Deficit Complexity: 3-5 performance deficits  Clinical Decision Making Complexity (OT): detailed assessment/moderate complexity  Overall Complexity of Evaluation (OT): moderate complexity     Time Calculation- OT       Row Name 12/22/23 1525             Time Calculation- OT    OT Start Time 1455  -RB      OT Stop Time 1520  -RB      OT Time Calculation (min) 25 min  -RB      Total Timed Code Minutes- OT 8 minute(s)  -RB      OT Received On 12/22/23  -RB      OT - Next Appointment 12/26/23  -RB      OT Goal Re-Cert Due Date 01/05/24  -RB         Timed Charges    51558 - OT Therapeutic Exercise Minutes 8  -RB         Untimed Charges    OT Eval/Re-eval Minutes 17  -RB         Total Minutes    Timed Charges Total Minutes 8  -RB      Untimed Charges Total Minutes 17  -RB       Total Minutes 25  -RB                User Key  (r) = Recorded By, (t) = Taken By, (c) = Cosigned By      Initials Name Provider Type     RB Sarah Beck OT Occupational Therapist                  Therapy Charges for Today       Code Description Service Date Service Provider Modifiers Qty    [id number]  OT THER PROC EA 15 MIN 12/22/2023 Sarah Beck OT GO 1    [id number]  OT EVAL MOD COMPLEXITY 2 12/22/2023 Sarah Beck OT GO 1                 Sarah Beck OT  12/22/2023

## 2023-12-22 NOTE — NURSING NOTE
"Access follow-up due to depression.  Patient is currently sitting in bedside chair, watching TV and texting friends.  States he has a very good support system in place.  Discusses living in [place] previously.  He denies depression currently.  Rates this condition a 0/10.  Regarding anxiety \"I am anxious to get out of here.\"  He denies SI currently and is convincing.  Previously declined resources for mental health, denies this is problematic.  He smiles frequently in conversation, mood euthymic.  Discussed follow up with Access, patient states \"I hope so\" when discussing removing from our list due to no psychiatric concerns. Denies need for access follow-up, states he is in a good place.  Thus, access will sign off for now. Please re-consult if future needs should arise.  "

## 2023-12-23 LAB
ALBUMIN SERPL-MCNC: 3.2 G/DL (ref 3.5–5.2)
ALBUMIN/GLOB SERPL: 0.9 G/DL
ALP SERPL-CCNC: 37 U/L (ref 39–117)
ALT SERPL W P-5'-P-CCNC: 18 U/L (ref 1–41)
ANION GAP SERPL CALCULATED.3IONS-SCNC: 11 MMOL/L (ref 5–15)
AST SERPL-CCNC: 42 U/L (ref 1–40)
BASOPHILS # BLD AUTO: 0.01 10*3/MM3 (ref 0–0.2)
BASOPHILS NFR BLD AUTO: 0.2 % (ref 0–1.5)
BILIRUB SERPL-MCNC: 0.3 MG/DL (ref 0–1.2)
BUN SERPL-MCNC: 16 MG/DL (ref 8–23)
BUN/CREAT SERPL: 20.3 (ref 7–25)
CALCIUM SPEC-SCNC: 8.6 MG/DL (ref 8.6–10.5)
CHLORIDE SERPL-SCNC: 102 MMOL/L (ref 98–107)
CO2 SERPL-SCNC: 22 MMOL/L (ref 22–29)
CREAT SERPL-MCNC: 0.79 MG/DL (ref 0.76–1.27)
DEPRECATED RDW RBC AUTO: 47.9 FL (ref 37–54)
EGFRCR SERPLBLD CKD-EPI 2021: 90.4 ML/MIN/1.73
EOSINOPHIL # BLD AUTO: 0.01 10*3/MM3 (ref 0–0.4)
EOSINOPHIL NFR BLD AUTO: 0.2 % (ref 0.3–6.2)
ERYTHROCYTE [DISTWIDTH] IN BLOOD BY AUTOMATED COUNT: 14.4 % (ref 12.3–15.4)
GLOBULIN UR ELPH-MCNC: 3.4 GM/DL
GLUCOSE SERPL-MCNC: 90 MG/DL (ref 65–99)
HCT VFR BLD AUTO: 36.2 % (ref 37.5–51)
HGB BLD-MCNC: 12.2 G/DL (ref 13–17.7)
IMM GRANULOCYTES # BLD AUTO: 0.03 10*3/MM3 (ref 0–0.05)
IMM GRANULOCYTES NFR BLD AUTO: 0.5 % (ref 0–0.5)
LYMPHOCYTES # BLD AUTO: 1.72 10*3/MM3 (ref 0.7–3.1)
LYMPHOCYTES NFR BLD AUTO: 27.6 % (ref 19.6–45.3)
MCH RBC QN AUTO: 31.7 PG (ref 26.6–33)
MCHC RBC AUTO-ENTMCNC: 33.7 G/DL (ref 31.5–35.7)
MCV RBC AUTO: 94 FL (ref 79–97)
MONOCYTES # BLD AUTO: 0.56 10*3/MM3 (ref 0.1–0.9)
MONOCYTES NFR BLD AUTO: 9 % (ref 5–12)
NEUTROPHILS NFR BLD AUTO: 3.9 10*3/MM3 (ref 1.7–7)
NEUTROPHILS NFR BLD AUTO: 62.5 % (ref 42.7–76)
NRBC BLD AUTO-RTO: 0 /100 WBC (ref 0–0.2)
PLATELET # BLD AUTO: 150 10*3/MM3 (ref 140–450)
PMV BLD AUTO: 10.3 FL (ref 6–12)
POTASSIUM SERPL-SCNC: 4 MMOL/L (ref 3.5–5.2)
PROT SERPL-MCNC: 6.6 G/DL (ref 6–8.5)
RBC # BLD AUTO: 3.85 10*6/MM3 (ref 4.14–5.8)
SODIUM SERPL-SCNC: 135 MMOL/L (ref 136–145)
WBC NRBC COR # BLD AUTO: 6.23 10*3/MM3 (ref 3.4–10.8)

## 2023-12-23 PROCEDURE — 80053 COMPREHEN METABOLIC PANEL: CPT | Performed by: INTERNAL MEDICINE

## 2023-12-23 PROCEDURE — 63710000001 PREDNISONE PER 1 MG: Performed by: INTERNAL MEDICINE

## 2023-12-23 PROCEDURE — 97116 GAIT TRAINING THERAPY: CPT

## 2023-12-23 PROCEDURE — 97530 THERAPEUTIC ACTIVITIES: CPT

## 2023-12-23 PROCEDURE — 85025 COMPLETE CBC W/AUTO DIFF WBC: CPT | Performed by: INTERNAL MEDICINE

## 2023-12-23 RX ADMIN — ATENOLOL 50 MG: 50 TABLET ORAL at 09:19

## 2023-12-23 RX ADMIN — SENNOSIDES AND DOCUSATE SODIUM 2 TABLET: 50; 8.6 TABLET ORAL at 09:20

## 2023-12-23 RX ADMIN — ACETAMINOPHEN 1000 MG: 500 TABLET ORAL at 09:19

## 2023-12-23 RX ADMIN — Medication 10 ML: at 09:20

## 2023-12-23 RX ADMIN — Medication 10 ML: at 20:46

## 2023-12-23 RX ADMIN — ACETAMINOPHEN 1000 MG: 500 TABLET ORAL at 15:13

## 2023-12-23 RX ADMIN — PREDNISONE 40 MG: 20 TABLET ORAL at 09:19

## 2023-12-23 RX ADMIN — ACETAMINOPHEN 1000 MG: 500 TABLET ORAL at 20:46

## 2023-12-23 RX ADMIN — TAMSULOSIN HYDROCHLORIDE 0.4 MG: 0.4 CAPSULE ORAL at 20:46

## 2023-12-23 NOTE — THERAPY TREATMENT NOTE
Patient Name: Anthony Gallegos  : 1945    MRN: [id number]                              Today's Date: 2023       Admit Date: 2023    Visit Dx:     ICD-10-CM ICD-9-CM   1. Muscle pain  M79.10 729.1   2. Dizziness  R42 780.4   3. Immobility  Z74.09 799.89   4. Fall, initial encounter  W19.XXXA E888.9   5. Elevated CK  R74.8 790.5   6. Arthralgia, unspecified joint  M25.50 719.40     Patient Active Problem List   Diagnosis    Ankylosing spondylitis of cervical region    Recent unexplained weight loss    Abnormal serum lipase level    Abnormal serum level of amylase    Hypertension    Boil    Immobility    Fall from bed    Tetrahydrocannabinol (THC) use disorder, mild, abuse    Generalized pain        Grief    Urine retention    Constipation    DISH (disseminated idiopathic skeletal hyperostosis)    Generalized weakness     Past Medical History:   Diagnosis Date    Abscess of scrotum     Arthritis     AS (ankylosing spondylitis)     Hypertension     Tetrahydrocannabinol (THC) use disorder, mild, abuse 2023    Uveitis      Past Surgical History:   Procedure Laterality Date    COLONOSCOPY      ROTATOR CUFF REPAIR Right     TOTAL HIP ARTHROPLASTY Left 2014      General Information       Row Name 23 1612          Physical Therapy Time and Intention    Document Type therapy note (daily note)  -     Mode of Treatment physical therapy  -       Row Name 23 1612          General Information    Patient Profile Reviewed yes  -     Prior Level of Function independent:;ADL's;transfer  -     Existing Precautions/Restrictions fall  -     Barriers to Rehab none identified  -       Row Name 23 1612          Cognition    Orientation Status (Cognition) oriented x 4  -       Row Name 23 1612          Safety Issues, Functional Mobility    Impairments Affecting Function (Mobility) balance;coordination;endurance/activity tolerance;range of motion (ROM);strength  -                User Key  (r) = Recorded By, (t) = Taken By, (c) = Cosigned By      Initials Name Provider Type    Leobardo Hewitt, PT Physical Therapist                   Mobility       Row Name 12/23/23 1612          Bed Mobility    Bed Mobility sit-supine  -DR     Supine-Sit [place] (Bed Mobility) contact guard;1 person assist;verbal cues  -DR     Assistive Device (Bed Mobility) head of bed elevated  -DR       Row Name 12/23/23 1612          Sit-Stand Transfer    Sit-Stand [place] (Transfers) contact guard;verbal cues;1 person assist  -DR     Assistive Device (Sit-Stand Transfers) walker, front-wheeled  -DR     Comment, (Sit-Stand Transfer) elevated bed height due to pt height; pt used bilat. UE to push off from bed to stand  -DR       Row Name 12/23/23 1612          Gait/Stairs (Locomotion)    [place] Level (Gait) contact guard;standby assist;verbal cues;1 person assist  -DR     Assistive Device (Gait) walker, front-wheeled  -DR     Patient was able to Ambulate yes  -DR     Distance in Feet (Gait) 250  -DR     Deviations/Abnormal Patterns (Gait) gait speed decreased;ranjit decreased;base of support, narrow  -DR     Bilateral Gait Deviations forward flexed posture;heel strike decreased  -DR               User Key  (r) = Recorded By, (t) = Taken By, (c) = Cosigned By      Initials Name Provider Type    Leobardo Hewitt, PT Physical Therapist                   Obj/Interventions       Row Name 12/23/23 1614          Range of Motion Comprehensive    General Range of Motion bilateral lower extremity ROM WFL  -DR       Row Name 12/23/23 1614          Strength Comprehensive (MMT)    General Manual Muscle Testing (MMT) Assessment no strength deficits identified  -DR       Row Name 12/23/23 1614          Balance    Dynamic Standing Balance standby assist;contact guard  -DR     Position/Device Used, Standing Balance supported;walker, front-wheeled  -DR               User Key  (r) = Recorded By, (t) = Taken By,  (c) = Cosigned By      Initials Name Provider Type    Leobardo Hewitt, PT Physical Therapist                   Goals/Plan       Row Name 12/23/23 1617          Bed Mobility Goal 1 (PT)    Activity/Assistive Device (Bed Mobility Goal 1, PT) bed mobility activities, all  -DR     [place] Level/Cues Needed (Bed Mobility Goal 1, PT) supervision required  -DR     Time Frame (Bed Mobility Goal 1, PT) 1 week  -DR       Row Name 12/23/23 1617          Transfer Goal 1 (PT)    Activity/Assistive Device (Transfer Goal 1, PT) transfers, all  -DR     [place] Level/Cues Needed (Transfer Goal 1, PT) supervision required  -DR     Time Frame (Transfer Goal 1, PT) 1 week  -DR       Row Name 12/23/23 1617          Gait Training Goal 1 (PT)    Activity/Assistive Device (Gait Training Goal 1, PT) gait (walking locomotion)  -DR     [place] Level (Gait Training Goal 1, PT) supervision required  -DR     Time Frame (Gait Training Goal 1, PT) 1 week  -DR               User Key  (r) = Recorded By, (t) = Taken By, (c) = Cosigned By      Initials Name Provider Type    Leobardo Hewitt, PT Physical Therapist                   Clinical Impression       Row Name 12/23/23 1612          Pain    Pretreatment Pain Rating 0/10 - no pain  -DR     Posttreatment Pain Rating 0/10 - no pain  -DR       Row Name 12/23/23 1614          Plan of Care Review    Plan of Care Reviewed With patient  -DR     Progress improving  -DR     Outcome Evaluation Pt agreeable to PT this afternoon. Pt completed supine > sit EOB with CGAx1 and able to scoot to edge appropriately. Pt completed STS with CGAx1 and height of bed raised due to pt height. No LOB noted during STS. Pt able to complete fwd reach for cup with one UE on rwx with no LOB. Pt ambulated using rwx 250ft around hospital at CG-SBAx1, no LOB but very slow gait speed/ranjit. Pt had no reports of fatigue and required no rest breaks. Pt returned to recliner with all needs met and call light in  reach. Recommend home with HHPT or OPPT at time of d/c.  -       Row Name 12/23/23 1614          Therapy Assessment/Plan (PT)    Rehab Potential (PT) good, to achieve stated therapy goals  -     Criteria for Skilled Interventions Met (PT) yes  -     Therapy Frequency (PT) 5 times/wk  -       Row Name 12/23/23 1614          Positioning and Restraints    Pre-Treatment Position in bed  -DR     Post Treatment Position chair  -DR     In Chair notified nsg;reclined;call light within reach;encouraged to call for assist  -               User Key  (r) = Recorded By, (t) = Taken By, (c) = Cosigned By      Initials Name Provider Type    Leobardo Hewitt, PT Physical Therapist                   Outcome Measures       Row Name 12/23/23 1617 12/23/23 0920       How much help from another person do you currently need...    Turning from your back to your side while in flat bed without using bedrails? 4  -DR 3  -AR    Moving from lying on back to sitting on the side of a flat bed without bedrails? 4  -DR 3  -AR    Moving to and from a bed to a chair (including a wheelchair)? 3  -DR 3  -AR    Standing up from a chair using your arms (e.g., wheelchair, bedside chair)? 3  -DR 3  -AR    Climbing 3-5 steps with a railing? 2  -DR 2  -AR    To walk in hospital room? 3  -DR 3  -AR    AM-PAC 6 Clicks Score (PT) 19  -DR 17  -AR    Highest Level of Mobility Goal 6 --> Walk 10 steps or more  - 5 --> Static standing  -AR      Row Name 12/23/23 1617          Functional Assessment    Outcome Measure Options AM-PAC 6 Clicks Basic Mobility (PT)  -               User Key  (r) = Recorded By, (t) = Taken By, (c) = Cosigned By      Initials Name Provider Type    Mera Cisneros, RN Registered Nurse    Leobardo Hewitt, PT Physical Therapist                                 Physical Therapy Education       Title: PT OT SLP Therapies (In Progress)       Topic: Physical Therapy (Done)       Point: Mobility training (Done)        Learning Progress Summary             Patient Acceptance, E,TB, VU by  at 12/23/2023 1618    Eager, E,TB,D, VU,NR by AMELIA at 12/22/2023 1700    Acceptance, E, VU by CLARISSA at 12/21/2023 1713                         Point: Home exercise program (Done)       Learning Progress Summary             Patient Acceptance, E,TB, VU by  at 12/23/2023 1618    Eager, E,TB,D, VU,NR by AMELIA at 12/22/2023 1700    Acceptance, E, VU by CLARISSA at 12/21/2023 1713                         Point: Body mechanics (Done)       Learning Progress Summary             Patient Acceptance, E,TB, VU by  at 12/23/2023 1618    Eager, E,TB,D, VU,NR by AMELIA at 12/22/2023 1700    Acceptance, E, VU by CLARISSA at 12/21/2023 1713                         Point: Precautions (Done)       Learning Progress Summary             Patient Acceptance, E,TB, VU by  at 12/23/2023 1618    Eager, E,TB,D, VU,NR by AMELIA at 12/22/2023 1700    Acceptance, E, VU by DB at 12/21/2023 1713                                         User Key       Initials Effective Dates Name Provider Type Discipline    AMELIA 03/07/18 -  Rosa Portillo, PTA Physical Therapist Assistant PT    CLARISSA 06/16/21 -  Felipa Weathers, PT Physical Therapist PT     05/24/23 -  Leobardo Weller, PT Physical Therapist PT                  PT Recommendation and Plan     Plan of Care Reviewed With: patient  Progress: improving  Outcome Evaluation: Pt agreeable to PT this afternoon. Pt completed supine > sit EOB with CGAx1 and able to scoot to edge appropriately. Pt completed STS with CGAx1 and height of bed raised due to pt height. No LOB noted during STS. Pt able to complete fwd reach for cup with one UE on rwx with no LOB. Pt ambulated using rwx 250ft around hospital at CG-SBAx1, no LOB but very slow gait speed/ranjit. Pt had no reports of fatigue and required no rest breaks. Pt returned to recliner with all needs met and call light in reach. Recommend home with HHPT or OPPT at time of d/c.     Time Calculation:         PT  Charges       Row Name 12/23/23 1618             Time Calculation    Start Time 1540  -DR      Stop Time 1609  -DR      Time Calculation (min) 29 min  -DR      PT Received On 12/23/23  -DR      PT - Next Appointment 12/26/23  -DR      PT Goal Re-Cert Due Date 12/30/23  -DR         Time Calculation- PT    Total Timed Code Minutes- PT 29 minute(s)  -DR         Timed Charges    75304 - Gait Training Minutes  8  -DR      56254 - PT Therapeutic Activity Minutes 21  -DR         Total Minutes    Timed Charges Total Minutes 29  -DR       Total Minutes 29  -DR                User Key  (r) = Recorded By, (t) = Taken By, (c) = Cosigned By      Initials Name Provider Type    Leobardo Hewitt, PT Physical Therapist                  Therapy Charges for Today       Code Description Service Date Service Provider Modifiers Qty    43777118107 HC GAIT TRAINING EA 15 MIN 12/23/2023 Leobardo Weller, PT GP 1    78265815442 HC PT THERAPEUTIC ACT EA 15 MIN 12/23/2023 Leobardo Weller, PT GP 1            PT G-Codes  Outcome Measure Options: AM-PAC 6 Clicks Basic Mobility (PT)  AM-PAC 6 Clicks Score (PT): 19  AM-PAC 6 Clicks Score (OT): 14  Modified Jackson Scale: 4 - Moderately severe disability.  Unable to walk without assistance, and unable to attend to own bodily needs without assistance.  PT Discharge Summary  Anticipated Discharge Disposition (PT): home with home health, home with outpatient therapy services    Leobardo Weller, GERI  12/23/2023

## 2023-12-23 NOTE — PLAN OF CARE
Problem: Adult Inpatient Plan of Care  Goal: Plan of Care Review  Outcome: Ongoing, Progressing  Goal: Patient-Specific Goal (Individualized)  Outcome: Ongoing, Progressing  Goal: Absence of Hospital-Acquired Illness or Injury  Outcome: Ongoing, Progressing  Intervention: Identify and Manage Fall Risk  Recent Flowsheet Documentation  Taken 12/23/2023 0558 by Jaycee Acuna RN  Safety Promotion/Fall Prevention:   safety round/check completed   lighting adjusted   clutter free environment maintained   assistive device/personal items within reach  Taken 12/23/2023 0400 by Jaycee Acuna RN  Safety Promotion/Fall Prevention:   safety round/check completed   lighting adjusted   clutter free environment maintained   assistive device/personal items within reach  Taken 12/23/2023 0200 by Jaycee Acuna RN  Safety Promotion/Fall Prevention:   safety round/check completed   lighting adjusted   clutter free environment maintained   assistive device/personal items within reach  Taken 12/22/2023 2200 by Jaycee Acuna RN  Safety Promotion/Fall Prevention:   safety round/check completed   lighting adjusted  Taken 12/22/2023 2005 by Jaycee Acuna RN  Safety Promotion/Fall Prevention:   safety round/check completed   lighting adjusted  Taken 12/22/2023 1945 by Jaycee Acuna RN  Safety Promotion/Fall Prevention:   safety round/check completed   lighting adjusted   clutter free environment maintained   assistive device/personal items within reach  Intervention: Prevent Skin Injury  Recent Flowsheet Documentation  Taken 12/22/2023 1945 by Jaycee Acuna RN  Skin Protection:   adhesive use limited   incontinence pads utilized  Intervention: Prevent and Manage VTE (Venous Thromboembolism) Risk  Recent Flowsheet Documentation  Taken 12/22/2023 1945 by Jaycee Acuna RN  VTE Prevention/Management:   compression stockings on   bilateral  Range of Motion: active ROM (range of motion) encouraged  Intervention:  Prevent Infection  Recent Flowsheet Documentation  Taken 12/23/2023 0558 by Jaycee Acuna RN  Infection Prevention:   single patient room provided   rest/sleep promoted  Taken 12/23/2023 0400 by Jaycee Acuna RN  Infection Prevention:   rest/sleep promoted   single patient room provided  Taken 12/23/2023 0200 by Jaycee Acuna RN  Infection Prevention:   single patient room provided   rest/sleep promoted  Taken 12/22/2023 2200 by Jaycee Acuna RN  Infection Prevention:   single patient room provided   rest/sleep promoted  Taken 12/22/2023 2005 by Jaycee Acuna RN  Infection Prevention:   single patient room provided   rest/sleep promoted  Taken 12/22/2023 1945 by Jaycee Acuna RN  Infection Prevention:   single patient room provided   rest/sleep promoted   hand hygiene promoted  Goal: Optimal Comfort and Wellbeing  Outcome: Ongoing, Progressing  Intervention: Provide Person-Centered Care  Recent Flowsheet Documentation  Taken 12/22/2023 1945 by Jaycee Acuna RN  Trust Relationship/Rapport:   care explained   questions answered   thoughts/feelings acknowledged   reassurance provided  Goal: Readiness for Transition of Care  Outcome: Ongoing, Progressing     Problem: Fall Injury Risk  Goal: Absence of Fall and Fall-Related Injury  Outcome: Ongoing, Progressing  Intervention: Identify and Manage Contributors  Recent Flowsheet Documentation  Taken 12/22/2023 1945 by Jaycee Acuna RN  Medication Review/Management: medications reviewed  Intervention: Promote Injury-Free Environment  Recent Flowsheet Documentation  Taken 12/23/2023 0558 by Jaycee Acuna RN  Safety Promotion/Fall Prevention:   safety round/check completed   lighting adjusted   clutter free environment maintained   assistive device/personal items within reach  Taken 12/23/2023 0400 by Jaycee Acuna RN  Safety Promotion/Fall Prevention:   safety round/check completed   lighting adjusted   clutter free environment  maintained   assistive device/personal items within reach  Taken 12/23/2023 0200 by Jaycee Acuna RN  Safety Promotion/Fall Prevention:   safety round/check completed   lighting adjusted   clutter free environment maintained   assistive device/personal items within reach  Taken 12/22/2023 2200 by Jaycee Acuna RN  Safety Promotion/Fall Prevention:   safety round/check completed   lighting adjusted  Taken 12/22/2023 2005 by Jaycee Acuna RN  Safety Promotion/Fall Prevention:   safety round/check completed   lighting adjusted  Taken 12/22/2023 1945 by Jaycee Acuna RN  Safety Promotion/Fall Prevention:   safety round/check completed   lighting adjusted   clutter free environment maintained   assistive device/personal items within reach     Problem: Skin Injury Risk Increased  Goal: Skin Health and Integrity  Outcome: Ongoing, Progressing  Intervention: Optimize Skin Protection  Recent Flowsheet Documentation  Taken 12/22/2023 1945 by Jaycee Acuna RN  Pressure Reduction Techniques: frequent weight shift encouraged  Pressure Reduction Devices: alternating pressure pump (ADD)  Skin Protection:   adhesive use limited   incontinence pads utilized     Problem: Malnutrition  Goal: Improved Nutritional Intake  Outcome: Ongoing, Progressing   Goal Outcome Evaluation:       Goal Outcome Evaluation:  Plan of Care Reviewed With: patient  Progress: improving  Outcome Evaluation: he remains alert and oriented x4. VSS on room air, meds given whole in applesauce, he rested in bed throughout this shift. PIV s/l flushes; patent, no acute distress noted, Call light and personal items within reach. Safety precautions in place.

## 2023-12-23 NOTE — PLAN OF CARE
Goal Outcome Evaluation:  Plan of Care Reviewed With: patient        Progress: improving  Outcome Evaluation: Pt agreeable to PT this afternoon. Pt completed supine > sit EOB with CGAx1 and able to scoot to edge appropriately. Pt completed STS with CGAx1 and height of bed raised due to pt height. No LOB noted during STS. Pt able to complete fwd reach for cup with one UE on rwx with no LOB. Pt ambulated using rwx 250ft around hospital at CG-SBAx1, no LOB but very slow gait speed/ranjit. Pt had no reports of fatigue and required no rest breaks. Pt returned to recliner with all needs met and call light in reach. Recommend home with HHPT or OPPT at time of d/c.      Anticipated Discharge Disposition (PT): home with home health, home with outpatient therapy services

## 2023-12-23 NOTE — PROGRESS NOTES
Name: Anthony Gallegos ADMIT: 2023   : 1945  PCP: Marco Macedo MD    MRN: [id number] LOS: 1 days   AGE/SEX: 78 y.o. male  ROOM: [facility]     Subjective   Subjective   No new concerns today. Feels about the same.    Objective   Objective   Vital Signs  Temp:  [97.7 °F (36.5 °C)-98.8 °F (37.1 °C)] 98.1 °F (36.7 °C)  Heart Rate:  [55-61] 58  Resp:  [16-18] 16  BP: (101-110)/(51-65) 110/51  SpO2:  [98 %-99 %] 98 %  on   ;   Device (Oxygen Therapy): room air  Body mass index is 22.4 kg/m².  Physical Exam  Constitutional:       Appearance: He is ill-appearing.   Cardiovascular:      Rate and Rhythm: Normal rate.      Pulses: Normal pulses.   Pulmonary:      Effort: Pulmonary effort is normal. No respiratory distress.      Breath sounds: Normal breath sounds.   Abdominal:      General: Abdomen is flat.      Palpations: Abdomen is soft.   Genitourinary:     Comments: Mariscal  Musculoskeletal:      Right lower leg: No edema.      Left lower leg: No edema.   Neurological:      Mental Status: He is alert.         Results Review     I reviewed the patient's new clinical results.  Results from last 7 days   Lab Units 23  0602 23  0732 23  1029   WBC 10*3/mm3 3.91 3.74 4.25   HEMOGLOBIN g/dL 11.6* 11.0* 13.0   PLATELETS 10*3/mm3 152 149 171     Results from last 7 days   Lab Units 23  0602 23  1720 23  0732 23  1029   SODIUM mmol/L 139  --  137 138   POTASSIUM mmol/L 4.1 4.6 3.5 3.8   CHLORIDE mmol/L 106  --  103 98   CO2 mmol/L 24.7  --  25.9 28.0   BUN mg/dL 15  --  13 18   CREATININE mg/dL 0.62*  --  0.80 1.00   GLUCOSE mg/dL 98  --  87 99   EGFR mL/min/1.73 97.2  --  90.0 76.6     Results from last 7 days   Lab Units 23  0602 23  0732 23  1029   ALBUMIN g/dL 2.9* 2.9* 3.8   BILIRUBIN mg/dL 0.3 0.3 0.4   ALK PHOS U/L 34* 35* 47   AST (SGOT) U/L 46* 45* 35   ALT (SGPT) U/L 18 11 12     Results from last 7 days   Lab Units 23  0602 23  0732  "12/20/23  1029   CALCIUM mg/dL 8.0* 7.9* 9.1   ALBUMIN g/dL 2.9* 2.9* 3.8   MAGNESIUM mg/dL  --  2.1 1.7     Results from last 7 days   Lab Units 12/20/23  2304 12/20/23 2003 12/20/23  1807   LACTATE mmol/L 1.8 2.4* 2.5*     No results found for: \"HGBA1C\", \"POCGLU\"    XR Spine Lumbar Complete 4+VW    Result Date: 12/21/2023  Ankylosis throughout the lumbar spine and bilateral sacroiliac joints consistent with known ankylosing spondylitis. No subluxation. Vertebral body heights are maintained. No appreciable interruption of the bridging syndesmophytes. If there is ongoing clinical concern for fracture, given background of extensive ankylosing spondylitis limiting evaluation, consider further evaluation with CT.  This report was finalized on 12/21/2023 12:35 PM by Dr. Landon Vaca M.D on Workstation: [id number]     Scheduled Medications  acetaminophen, 1,000 mg, Oral, TID  atenolol, 50 mg, Oral, Q24H  Lidocaine, 2 patch, Transdermal, Q24H  methotrexate, 2.5 mg, Oral, Once per day on Wed Thu  predniSONE, 40 mg, Oral, Daily With Breakfast  senna-docusate sodium, 2 tablet, Oral, BID  sodium chloride, 10 mL, Intravenous, Q12H  tamsulosin, 0.4 mg, Oral, Nightly    Infusions   Diet  Diet: Cardiac Diets; Healthy Heart (2-3 Na+); Texture: Regular Texture (IDDSI 7); Fluid Consistency: Thin (IDDSI 0)       Assessment/Plan     Active Hospital Problems    Diagnosis  POA    **Generalized weakness [R53.1]  Yes    Immobility [Z74.09]  Yes    Fall from bed [W06.XXXA]  Yes    Tetrahydrocannabinol (THC) use disorder, mild, abuse [F12.10]  Yes    Generalized pain [R52]  Yes     [Z63.4]  Yes    Grief [F43.21]  Yes    Urine retention [R33.9]  Yes    Constipation [K59.00]  Yes    DISH (disseminated idiopathic skeletal hyperostosis) [M48.10]  Yes    Hypertension [I10]  Yes    Ankylosing spondylitis of cervical region [M45.2]  Yes      Resolved Hospital Problems   No resolved problems to display.       78 y.o. male admitted with " Generalized weakness.      12/23/23  Cont holding HCTZ. Pending SNF.    Generalized weakness  Immobility  Fall from bed  -Multifactorial secondary to age and chronic comorbidities.  -PT/OT rec SNF     Generalized pain  Disseminated idiopathic skeletal hyperostosis  Ankylosing spondylitis  -X-ray of lumbar spine with diffuse ankylosis without any obvious injury or fracture.  -methotrexate  -PO prednisone for pain     Urinary retention  Constipation  -Mariscal catheter in place.  Flomax added.  -Urology evaluated, follow-up in 2 weeks with voiding trial    Elevated CK, resolved  Lactic acidosis, resolved  -downtrending, related to dehydration    Hypertension  -Atenolol; hold HCTZ- resume when appropriate         DVT prophylaxis: SCDs  Discussed with patient.  Anticipated discharge SNF, once arrangements made            Tom Abel MD  [facility]  12/23/23  07:20 EST

## 2023-12-23 NOTE — PLAN OF CARE
Problem: Adult Inpatient Plan of Care  Goal: Plan of Care Review  Outcome: Ongoing, Progressing  Flowsheets (Taken 12/23/2023 1602)  Progress: improving  Plan of Care Reviewed With: patient   Goal Outcome Evaluation:  Plan of Care Reviewed With: patient        Progress: improving

## 2023-12-24 PROBLEM — R33.9 URINE RETENTION: Status: RESOLVED | Noted: 2023-12-20 | Resolved: 2023-12-24

## 2023-12-24 PROBLEM — E43 SEVERE MALNUTRITION: Status: ACTIVE | Noted: 2023-12-24

## 2023-12-24 PROBLEM — K59.00 CONSTIPATION: Status: RESOLVED | Noted: 2023-12-20 | Resolved: 2023-12-24

## 2023-12-24 LAB
ALBUMIN SERPL-MCNC: 3 G/DL (ref 3.5–5.2)
ALBUMIN/GLOB SERPL: 1 G/DL
ALP SERPL-CCNC: 36 U/L (ref 39–117)
ALT SERPL W P-5'-P-CCNC: 16 U/L (ref 1–41)
ANION GAP SERPL CALCULATED.3IONS-SCNC: 6.6 MMOL/L (ref 5–15)
AST SERPL-CCNC: 34 U/L (ref 1–40)
BILIRUB SERPL-MCNC: 0.3 MG/DL (ref 0–1.2)
BUN SERPL-MCNC: 16 MG/DL (ref 8–23)
BUN/CREAT SERPL: 25 (ref 7–25)
CALCIUM SPEC-SCNC: 8.6 MG/DL (ref 8.6–10.5)
CHLORIDE SERPL-SCNC: 104 MMOL/L (ref 98–107)
CO2 SERPL-SCNC: 26.4 MMOL/L (ref 22–29)
CREAT SERPL-MCNC: 0.64 MG/DL (ref 0.76–1.27)
EGFRCR SERPLBLD CKD-EPI 2021: 96.3 ML/MIN/1.73
GLOBULIN UR ELPH-MCNC: 3.1 GM/DL
GLUCOSE SERPL-MCNC: 81 MG/DL (ref 65–99)
POTASSIUM SERPL-SCNC: 3.9 MMOL/L (ref 3.5–5.2)
PROT SERPL-MCNC: 6.1 G/DL (ref 6–8.5)
SODIUM SERPL-SCNC: 137 MMOL/L (ref 136–145)

## 2023-12-24 PROCEDURE — 80053 COMPREHEN METABOLIC PANEL: CPT | Performed by: INTERNAL MEDICINE

## 2023-12-24 PROCEDURE — 63710000001 PREDNISONE PER 1 MG: Performed by: INTERNAL MEDICINE

## 2023-12-24 RX ORDER — LIDOCAINE 4 G/G
2 PATCH TOPICAL
Qty: 30 EACH | Refills: 1 | Status: ON HOLD | OUTPATIENT
Start: 2023-12-25

## 2023-12-24 RX ORDER — MUSCLE RUB CREAM 100; 150 MG/G; MG/G
CREAM TOPICAL 4 TIMES DAILY PRN
Qty: 85 G | Refills: 0 | Status: ON HOLD | OUTPATIENT
Start: 2023-12-24

## 2023-12-24 RX ORDER — TAMSULOSIN HYDROCHLORIDE 0.4 MG/1
0.4 CAPSULE ORAL NIGHTLY
Qty: 30 CAPSULE | Refills: 1 | Status: ON HOLD | OUTPATIENT
Start: 2023-12-24

## 2023-12-24 RX ORDER — ATENOLOL 50 MG/1
50 TABLET ORAL
Qty: 30 TABLET | Refills: 0 | Status: ON HOLD | OUTPATIENT
Start: 2023-12-25

## 2023-12-24 RX ADMIN — Medication 10 ML: at 08:17

## 2023-12-24 RX ADMIN — ACETAMINOPHEN 1000 MG: 500 TABLET ORAL at 22:00

## 2023-12-24 RX ADMIN — Medication 10 ML: at 22:00

## 2023-12-24 RX ADMIN — ACETAMINOPHEN 1000 MG: 500 TABLET ORAL at 08:09

## 2023-12-24 RX ADMIN — SENNOSIDES AND DOCUSATE SODIUM 2 TABLET: 50; 8.6 TABLET ORAL at 22:00

## 2023-12-24 RX ADMIN — PREDNISONE 40 MG: 20 TABLET ORAL at 08:09

## 2023-12-24 RX ADMIN — TAMSULOSIN HYDROCHLORIDE 0.4 MG: 0.4 CAPSULE ORAL at 22:00

## 2023-12-24 RX ADMIN — SENNOSIDES AND DOCUSATE SODIUM 2 TABLET: 50; 8.6 TABLET ORAL at 08:08

## 2023-12-24 NOTE — PLAN OF CARE
Problem: Adult Inpatient Plan of Care  Goal: Plan of Care Review  Outcome: Ongoing, Progressing  Flowsheets (Taken 12/24/2023 2346)  Progress: improving  Plan of Care Reviewed With: patient  Outcome Evaluation: Patient is alert and oriented. Slept between care. Ambulated in hallway with x 1 stand by assist last night. VSS. Meds given whole with apple sauce. No PRN meds needed. Continue with plan of care.   Goal Outcome Evaluation:  Plan of Care Reviewed With: patient        Progress: improving  Outcome Evaluation: Patient is alert and oriented. Slept between care. Ambulated in hallway with x 1 stand by assist last night. VSS. Meds given whole with apple sauce. No PRN meds needed. Continue with plan of care.

## 2023-12-24 NOTE — PLAN OF CARE
Goal Outcome Evaluation:  Plan of Care Reviewed With: patient        Progress: improving  Outcome Evaluation: VSS. NVI. Aox4. pt reports no pain. x1 assist BRP. eula d/c today. pills whole in applesauce. pt to d/c when works out d/c plan with CCP.

## 2023-12-25 LAB
ALBUMIN SERPL-MCNC: 3.2 G/DL (ref 3.5–5.2)
ALBUMIN/GLOB SERPL: 1.1 G/DL
ALP SERPL-CCNC: 36 U/L (ref 39–117)
ALT SERPL W P-5'-P-CCNC: 24 U/L (ref 1–41)
ANION GAP SERPL CALCULATED.3IONS-SCNC: 8 MMOL/L (ref 5–15)
AST SERPL-CCNC: 33 U/L (ref 1–40)
BILIRUB SERPL-MCNC: 0.4 MG/DL (ref 0–1.2)
BUN SERPL-MCNC: 17 MG/DL (ref 8–23)
BUN/CREAT SERPL: 25.8 (ref 7–25)
CALCIUM SPEC-SCNC: 8.5 MG/DL (ref 8.6–10.5)
CHLORIDE SERPL-SCNC: 103 MMOL/L (ref 98–107)
CO2 SERPL-SCNC: 27 MMOL/L (ref 22–29)
CREAT SERPL-MCNC: 0.66 MG/DL (ref 0.76–1.27)
EGFRCR SERPLBLD CKD-EPI 2021: 95.4 ML/MIN/1.73
GLOBULIN UR ELPH-MCNC: 2.8 GM/DL
GLUCOSE SERPL-MCNC: 89 MG/DL (ref 65–99)
POTASSIUM SERPL-SCNC: 3.8 MMOL/L (ref 3.5–5.2)
PROT SERPL-MCNC: 6 G/DL (ref 6–8.5)
SODIUM SERPL-SCNC: 138 MMOL/L (ref 136–145)

## 2023-12-25 PROCEDURE — 25010000002 ENOXAPARIN PER 10 MG: Performed by: HOSPITALIST

## 2023-12-25 PROCEDURE — 63710000001 PREDNISONE PER 1 MG: Performed by: INTERNAL MEDICINE

## 2023-12-25 PROCEDURE — 80053 COMPREHEN METABOLIC PANEL: CPT | Performed by: INTERNAL MEDICINE

## 2023-12-25 RX ORDER — ENOXAPARIN SODIUM 100 MG/ML
40 INJECTION SUBCUTANEOUS EVERY 24 HOURS
Status: DISCONTINUED | OUTPATIENT
Start: 2023-12-25 | End: 2024-01-08 | Stop reason: HOSPADM

## 2023-12-25 RX ADMIN — ENOXAPARIN SODIUM 40 MG: 100 INJECTION SUBCUTANEOUS at 15:12

## 2023-12-25 RX ADMIN — ACETAMINOPHEN 1000 MG: 500 TABLET ORAL at 08:50

## 2023-12-25 RX ADMIN — ACETAMINOPHEN 1000 MG: 500 TABLET ORAL at 15:12

## 2023-12-25 RX ADMIN — TAMSULOSIN HYDROCHLORIDE 0.4 MG: 0.4 CAPSULE ORAL at 20:51

## 2023-12-25 RX ADMIN — Medication 10 ML: at 20:51

## 2023-12-25 RX ADMIN — ATENOLOL 50 MG: 50 TABLET ORAL at 08:50

## 2023-12-25 RX ADMIN — PREDNISONE 40 MG: 20 TABLET ORAL at 08:50

## 2023-12-25 RX ADMIN — ACETAMINOPHEN 1000 MG: 500 TABLET ORAL at 20:51

## 2023-12-25 RX ADMIN — SENNOSIDES AND DOCUSATE SODIUM 2 TABLET: 50; 8.6 TABLET ORAL at 08:50

## 2023-12-25 NOTE — PROGRESS NOTES
[facility]     LOS: 3 days     Subjective:    CC:Leg Pain    DIET:  Diet Order   Procedures    Diet: Cardiac Diets; Healthy Heart (2-3 Na+); Texture: Regular Texture (IDDSI 7); Fluid Consistency: Thin (IDDSI 0)   dizziness  No cp  No soa    Objective:    Vital Signs:  Temp:  [97.7 °F (36.5 °C)-99 °F (37.2 °C)] 98.6 °F (37 °C)  Heart Rate:  [51-69] 69  Resp:  [16-18] 18  BP: (105-126)/(56-65) 105/56    SpO2:  [96 %-100 %] 98 %  on   ;   Device (Oxygen Therapy): room air  Body mass index is 23.48 kg/m².    Physical Exam  Constitutional:       Appearance: Normal appearance.   HENT:      Head: Normocephalic and atraumatic.   Cardiovascular:      Rate and Rhythm: Normal rate and regular rhythm.      Heart sounds: No murmur heard.     No friction rub.   Pulmonary:      Effort: Pulmonary effort is normal.      Breath sounds: Normal breath sounds.   Abdominal:      General: Bowel sounds are normal. There is no distension.      Palpations: Abdomen is soft.      Tenderness: There is no abdominal tenderness.   Skin:     General: Skin is warm and dry.   Neurological:      Mental Status: He is alert.   Psychiatric:         Mood and Affect: Mood normal.         Behavior: Behavior normal.         Results Review:    Glucose   Date Value Ref Range Status   12/25/2023 89 65 - 99 mg/dL Final   12/24/2023 81 65 - 99 mg/dL Final   12/23/2023 90 65 - 99 mg/dL Final     Results from last 7 days   Lab Units 12/23/23  0750   WBC 10*3/mm3 6.23   HEMOGLOBIN g/dL 12.2*   HEMATOCRIT % 36.2*   PLATELETS 10*3/mm3 150     Results from last 7 days   Lab Units 12/25/23  0608   SODIUM mmol/L 138   POTASSIUM mmol/L 3.8   CHLORIDE mmol/L 103   CO2 mmol/L 27.0   BUN mg/dL 17   CREATININE mg/dL 0.66*   CALCIUM mg/dL 8.5*   BILIRUBIN mg/dL 0.4   ALK PHOS U/L 36*   ALT (SGPT) U/L 24   AST (SGOT) U/L 33   GLUCOSE mg/dL 89     Results from last 7 days   Lab Units 12/20/23  1029   INR  1.20*     Results from last 7 days   Lab  "Units 12/21/23  0732   MAGNESIUM mg/dL 2.1     Results from last 7 days   Lab Units 12/22/23  0602 12/21/23  0732 12/20/23  1807   CK TOTAL U/L 844* 940* 1,062*     Cultures:  No results found for: \"BLOODCX\", \"URINECX\", \"WOUNDCX\", \"MRSACX\", \"RESPCX\", \"STOOLCX\"    I have reviewed daily medications and changes in CPOE    Scheduled meds  acetaminophen, 1,000 mg, Oral, TID  atenolol, 50 mg, Oral, Q24H  Lidocaine, 2 patch, Transdermal, Q24H  methotrexate, 2.5 mg, Oral, Once per day on Wed Thu  predniSONE, 40 mg, Oral, Daily With Breakfast  senna-docusate sodium, 2 tablet, Oral, BID  sodium chloride, 10 mL, Intravenous, Q12H  tamsulosin, 0.4 mg, Oral, Nightly           PRN meds    acetaminophen **OR** acetaminophen **OR** acetaminophen    senna-docusate sodium **AND** polyethylene glycol **AND** bisacodyl **AND** bisacodyl    calcium carbonate    Calcium Replacement - Follow Nurse / BPA Driven Protocol    Magnesium Standard Dose Replacement - Follow Nurse / BPA Driven Protocol    melatonin    muscle rub    ondansetron ODT **OR** ondansetron    Phosphorus Replacement - Follow Nurse / BPA Driven Protocol    Potassium Replacement - Follow Nurse / BPA Driven Protocol    [COMPLETED] Insert Peripheral IV **AND** sodium chloride    sodium chloride    sodium chloride    traMADol        Generalized weakness    Ankylosing spondylitis of cervical region    Hypertension    Immobility    Fall from bed    Tetrahydrocannabinol (THC) use disorder, mild, abuse    Generalized pain        Grief    DISH (disseminated idiopathic skeletal hyperostosis)    Severe malnutrition        Assessment/Plan:      78 y.o. male admitted with Generalized weakness. Fell on to the floor and unable to get up and was down for 2-3 hours        12/25/23  PT originally recommended SNF however pt has improved and now recommending HHPT/OT. Unfortunately pt has no place to go, CCP consulted for assistance with discharge.      Generalized " weakness  Immobility  Fall from bed  -Multifactorial secondary to age and chronic comorbidities.  -PT/OT rec SNF   -weakness is much better    Generalized pain  Disseminated idiopathic skeletal hyperostosis  Ankylosing spondylitis  -X-ray of lumbar spine with diffuse ankylosis without any obvious injury or fracture.  -methotrexate  -PO prednisone for pain     Urinary retention  Constipation  -catheter is out.  Flomax added.  -Urology evaluated, follow-up in 2 weeks with voiding trial     Elevated CK, resolved  Lactic acidosis, resolved  -downtrending, related to dehydration     Hypertension  -Atenolol; hold HCTZ- resume when appropriate         Shola Brady MD  12/25/23  11:50 EST

## 2023-12-25 NOTE — PROGRESS NOTES
Name: Anthony Gallegos ADMIT: 2023   : 1945  PCP: Marco Macedo MD    MRN: [id number] LOS: 3 days   AGE/SEX: 78 y.o. male  ROOM: [facility]     Subjective   Subjective   No new issues today.    Objective   Objective   Vital Signs  Temp:  [97.7 °F (36.5 °C)-99 °F (37.2 °C)] 99 °F (37.2 °C)  Heart Rate:  [50-56] 51  Resp:  [16-18] 18  BP: (107-121)/(57-60) 121/57  SpO2:  [98 %-100 %] 100 %  on   ;   Device (Oxygen Therapy): room air  Body mass index is 22.4 kg/m².  Physical Exam  Constitutional:       Appearance: He is ill-appearing.   Cardiovascular:      Rate and Rhythm: Normal rate.      Pulses: Normal pulses.   Pulmonary:      Effort: Pulmonary effort is normal. No respiratory distress.      Breath sounds: Normal breath sounds.   Abdominal:      General: Abdomen is flat.      Palpations: Abdomen is soft.   Genitourinary:     Comments: Mariscal  Musculoskeletal:      Right lower leg: No edema.      Left lower leg: No edema.   Neurological:      Mental Status: He is alert.         Results Review     I reviewed the patient's new clinical results.  Results from last 7 days   Lab Units 23  0750 23  0602 23  0732 23  1029   WBC 10*3/mm3 6.23 3.91 3.74 4.25   HEMOGLOBIN g/dL 12.2* 11.6* 11.0* 13.0   PLATELETS 10*3/mm3 150 152 149 171     Results from last 7 days   Lab Units 23  0638 23  0750 23  0602 23  1720 23  0732   SODIUM mmol/L 137 135* 139  --  137   POTASSIUM mmol/L 3.9 4.0 4.1 4.6 3.5   CHLORIDE mmol/L 104 102 106  --  103   CO2 mmol/L 26.4 22.0 24.7  --  25.9   BUN mg/dL 16 16 15  --  13   CREATININE mg/dL 0.64* 0.79 0.62*  --  0.80   GLUCOSE mg/dL 81 90 98  --  87   EGFR mL/min/1.73 96.3 90.4 97.2  --  90.0     Results from last 7 days   Lab Units 23  0638 23  0750 23  0602 23  0732   ALBUMIN g/dL 3.0* 3.2* 2.9* 2.9*   BILIRUBIN mg/dL 0.3 0.3 0.3 0.3   ALK PHOS U/L 36* 37* 34* 35*   AST (SGOT) U/L 34 42* 46* 45*   ALT (SGPT)  "U/L 16 18 18 11     Results from last 7 days   Lab Units 12/24/23  0638 12/23/23  0750 12/22/23  0602 12/21/23  0732 12/20/23  1029   CALCIUM mg/dL 8.6 8.6 8.0* 7.9* 9.1   ALBUMIN g/dL 3.0* 3.2* 2.9* 2.9* 3.8   MAGNESIUM mg/dL  --   --   --  2.1 1.7     Results from last 7 days   Lab Units 12/20/23  2304 12/20/23 2003 12/20/23  1807   LACTATE mmol/L 1.8 2.4* 2.5*     No results found for: \"HGBA1C\", \"POCGLU\"    No radiology results for the last day  Scheduled Medications  acetaminophen, 1,000 mg, Oral, TID  atenolol, 50 mg, Oral, Q24H  Lidocaine, 2 patch, Transdermal, Q24H  methotrexate, 2.5 mg, Oral, Once per day on Wed Thu  predniSONE, 40 mg, Oral, Daily With Breakfast  senna-docusate sodium, 2 tablet, Oral, BID  sodium chloride, 10 mL, Intravenous, Q12H  tamsulosin, 0.4 mg, Oral, Nightly    Infusions   Diet  Diet: Cardiac Diets; Healthy Heart (2-3 Na+); Texture: Regular Texture (IDDSI 7); Fluid Consistency: Thin (IDDSI 0)       Assessment/Plan     Active Hospital Problems    Diagnosis  POA    **Generalized weakness [R53.1]  Yes    Severe malnutrition [E43]  Yes    Immobility [Z74.09]  Yes    Fall from bed [W06.XXXA]  Yes    Tetrahydrocannabinol (THC) use disorder, mild, abuse [F12.10]  Yes    Generalized pain [R52]  Yes     [Z63.4]  Yes    Grief [F43.21]  Yes    DISH (disseminated idiopathic skeletal hyperostosis) [M48.10]  Yes    Hypertension [I10]  Yes    Ankylosing spondylitis of cervical region [M45.2]  Yes      Resolved Hospital Problems    Diagnosis Date Resolved POA    Urine retention [R33.9] 12/24/2023 Yes    Constipation [K59.00] 12/24/2023 Yes       78 y.o. male admitted with Generalized weakness.      12/25/23  PT originally recommended SNF however pt has improved and now recommending HHPT/OT. Unfortunately pt has no place to go, CCP consulted for assistance with discharge.     Generalized weakness  Immobility  Fall from bed  -Multifactorial secondary to age and chronic comorbidities.  -PT/OT " rec SNF     Generalized pain  Disseminated idiopathic skeletal hyperostosis  Ankylosing spondylitis  -X-ray of lumbar spine with diffuse ankylosis without any obvious injury or fracture.  -methotrexate  -PO prednisone for pain     Urinary retention  Constipation  -Mariscal catheter in place.  Flomax added.  -Urology evaluated, follow-up in 2 weeks with voiding trial    Elevated CK, resolved  Lactic acidosis, resolved  -downtrending, related to dehydration    Hypertension  -Atenolol; hold HCTZ- resume when appropriate         DVT prophylaxis: SCDs  Discussed with patient.  Anticipated discharge SNF, once arrangements made            Tom Abel MD  [facility]  12/25/23  00:24 EST

## 2023-12-25 NOTE — PLAN OF CARE
Problem: Adult Inpatient Plan of Care  Goal: Plan of Care Review  Outcome: Ongoing, Progressing  Flowsheets (Taken 12/25/2023 1612)  Progress: improving  Plan of Care Reviewed With: patient   Goal Outcome Evaluation:  Plan of Care Reviewed With: patient        Progress: improving

## 2023-12-25 NOTE — PROGRESS NOTES
"[facility] Clinical Pharmacy Services: Enoxaparin Consult    Anthony Gallegos has a pharmacy consult to dose prophylactic enoxaparin per Dr. Brady's request.     Indication: VTE Prophylaxis  Home Anticoagulation: None     Relevant clinical data and objective history reviewed:  78 y.o. male 190.5 cm (75\") 85.2 kg (187 lb 13.3 oz)   Body mass index is 23.48 kg/m².   Results from last 7 days   Lab Units 12/23/23  0750   PLATELETS 10*3/mm3 150     Estimated Creatinine Clearance: 109.4 mL/min (A) (by C-G formula based on SCr of 0.66 mg/dL (L)).    Assessment/Plan    Will start patient on 40mg subcutaneous every 24 hours, adjusted for renal function. Consult order will be discontinued but pharmacy will continue to follow.     Masood Nolen III, PharmD  Clinical Pharmacist    "

## 2023-12-26 PROCEDURE — 97530 THERAPEUTIC ACTIVITIES: CPT

## 2023-12-26 PROCEDURE — 97116 GAIT TRAINING THERAPY: CPT

## 2023-12-26 PROCEDURE — 25010000002 ENOXAPARIN PER 10 MG: Performed by: HOSPITALIST

## 2023-12-26 PROCEDURE — 63710000001 PREDNISONE PER 1 MG: Performed by: INTERNAL MEDICINE

## 2023-12-26 RX ORDER — PREDNISONE 20 MG/1
20 TABLET ORAL
Status: COMPLETED | OUTPATIENT
Start: 2023-12-27 | End: 2023-12-27

## 2023-12-26 RX ORDER — PANTOPRAZOLE SODIUM 40 MG/1
40 TABLET, DELAYED RELEASE ORAL
Status: DISCONTINUED | OUTPATIENT
Start: 2023-12-26 | End: 2024-01-08 | Stop reason: HOSPADM

## 2023-12-26 RX ADMIN — ACETAMINOPHEN 1000 MG: 500 TABLET ORAL at 21:40

## 2023-12-26 RX ADMIN — PANTOPRAZOLE SODIUM 40 MG: 40 TABLET, DELAYED RELEASE ORAL at 17:33

## 2023-12-26 RX ADMIN — TAMSULOSIN HYDROCHLORIDE 0.4 MG: 0.4 CAPSULE ORAL at 21:40

## 2023-12-26 RX ADMIN — ATENOLOL 50 MG: 50 TABLET ORAL at 09:31

## 2023-12-26 RX ADMIN — Medication 10 ML: at 21:40

## 2023-12-26 RX ADMIN — ENOXAPARIN SODIUM 40 MG: 100 INJECTION SUBCUTANEOUS at 15:00

## 2023-12-26 RX ADMIN — PREDNISONE 40 MG: 20 TABLET ORAL at 09:31

## 2023-12-26 RX ADMIN — ACETAMINOPHEN 1000 MG: 500 TABLET ORAL at 09:31

## 2023-12-26 RX ADMIN — Medication 10 ML: at 09:32

## 2023-12-26 RX ADMIN — ACETAMINOPHEN 1000 MG: 500 TABLET ORAL at 15:00

## 2023-12-26 NOTE — PROGRESS NOTES
[facility]     LOS: 4 days     Subjective:    CC:Leg Pain    DIET:  Diet Order   Procedures    Diet: Cardiac Diets; Healthy Heart (2-3 Na+); Texture: Regular Texture (IDDSI 7); Fluid Consistency: Thin (IDDSI 0)   dizziness  No cp  No soa    Objective:    Vital Signs:  Temp:  [97.5 °F (36.4 °C)-98.6 °F (37 °C)] 98.2 °F (36.8 °C)  Heart Rate:  [50-62] 54  Resp:  [18] 18  BP: (104-129)/(50-75) 104/50    SpO2:  [97 %-100 %] 100 %  on   ;   Device (Oxygen Therapy): room air  Body mass index is 23.48 kg/m².    Physical Exam  Constitutional:       Appearance: Normal appearance.   HENT:      Head: Normocephalic and atraumatic.   Cardiovascular:      Rate and Rhythm: Normal rate and regular rhythm.      Heart sounds: No murmur heard.     No friction rub.   Pulmonary:      Effort: Pulmonary effort is normal.      Breath sounds: Normal breath sounds.   Abdominal:      General: Bowel sounds are normal. There is no distension.      Palpations: Abdomen is soft.      Tenderness: There is no abdominal tenderness.   Skin:     General: Skin is warm and dry.   Neurological:      Mental Status: He is alert.   Psychiatric:         Mood and Affect: Mood normal.         Behavior: Behavior normal.         Results Review:    Glucose   Date Value Ref Range Status   12/25/2023 89 65 - 99 mg/dL Final   12/24/2023 81 65 - 99 mg/dL Final     Results from last 7 days   Lab Units 12/23/23  0750   WBC 10*3/mm3 6.23   HEMOGLOBIN g/dL 12.2*   HEMATOCRIT % 36.2*   PLATELETS 10*3/mm3 150     Results from last 7 days   Lab Units 12/25/23  0608   SODIUM mmol/L 138   POTASSIUM mmol/L 3.8   CHLORIDE mmol/L 103   CO2 mmol/L 27.0   BUN mg/dL 17   CREATININE mg/dL 0.66*   CALCIUM mg/dL 8.5*   BILIRUBIN mg/dL 0.4   ALK PHOS U/L 36*   ALT (SGPT) U/L 24   AST (SGOT) U/L 33   GLUCOSE mg/dL 89     Results from last 7 days   Lab Units 12/20/23  1029   INR  1.20*     Results from last 7 days   Lab Units 12/21/23  0732   MAGNESIUM mg/dL  "2.1     Results from last 7 days   Lab Units 12/22/23  0602 12/21/23  0732 12/20/23  1807   CK TOTAL U/L 844* 940* 1,062*     Cultures:  No results found for: \"BLOODCX\", \"URINECX\", \"WOUNDCX\", \"MRSACX\", \"RESPCX\", \"STOOLCX\"    I have reviewed daily medications and changes in CPOE    Scheduled meds  acetaminophen, 1,000 mg, Oral, TID  atenolol, 50 mg, Oral, Q24H  enoxaparin, 40 mg, Subcutaneous, Q24H  Lidocaine, 2 patch, Transdermal, Q24H  methotrexate, 2.5 mg, Oral, Once per day on Wed Thu  predniSONE, 40 mg, Oral, Daily With Breakfast  senna-docusate sodium, 2 tablet, Oral, BID  sodium chloride, 10 mL, Intravenous, Q12H  tamsulosin, 0.4 mg, Oral, Nightly           PRN meds    acetaminophen **OR** acetaminophen **OR** acetaminophen    senna-docusate sodium **AND** polyethylene glycol **AND** bisacodyl **AND** bisacodyl    calcium carbonate    Calcium Replacement - Follow Nurse / BPA Driven Protocol    Magnesium Standard Dose Replacement - Follow Nurse / BPA Driven Protocol    melatonin    muscle rub    ondansetron ODT **OR** ondansetron    Phosphorus Replacement - Follow Nurse / BPA Driven Protocol    Potassium Replacement - Follow Nurse / BPA Driven Protocol    [COMPLETED] Insert Peripheral IV **AND** sodium chloride    sodium chloride    sodium chloride    traMADol        Generalized weakness    Ankylosing spondylitis of cervical region    Hypertension    Immobility    Fall from bed    Tetrahydrocannabinol (THC) use disorder, mild, abuse    Generalized pain        Grief    DISH (disseminated idiopathic skeletal hyperostosis)    Severe malnutrition        Assessment/Plan:      78 y.o. male admitted with Generalized weakness. Fell on to the floor and unable to get up and was down for 2-3 hours        12/25/23  PT originally recommended SNF however pt has improved and now recommending HHPT/OT. Unfortunately pt has no place to go, CCP consulted for assistance with discharge.      Generalized " weakness  Immobility  Fall from bed  -Multifactorial secondary to age and chronic comorbidities.  -PT/OT rec SNF   -weakness is much better    Generalized pain  Disseminated idiopathic skeletal hyperostosis  Ankylosing spondylitis  -X-ray of lumbar spine with diffuse ankylosis without any obvious injury or fracture.  -methotrexate  -PO prednisone for pain     Urinary retention  Constipation  -catheter is out.  Flomax added.  -Urology evaluated, follow-up in 2 weeks with voiding trial     Elevated CK, resolved  Lactic acidosis, resolved  -downtrending, related to dehydration     Hypertension  -Atenolol; hold HCTZ- resume when appropriate     Note reviewed    D/w ccp    Shola Brady MD  12/26/23  16:46 EST

## 2023-12-26 NOTE — PLAN OF CARE
Goal Outcome Evaluation:  Patient alert and oriented call light at reach v/s stable we will continue monitoring

## 2023-12-26 NOTE — THERAPY TREATMENT NOTE
Patient Name: Anthony Gallegos  : 1945    MRN: [id number]                              Today's Date: 2023       Admit Date: 2023    Visit Dx:     ICD-10-CM ICD-9-CM   1. Muscle pain  M79.10 729.1   2. Dizziness  R42 780.4   3. Immobility  Z74.09 799.89   4. Fall, initial encounter  W19.XXXA E888.9   5. Elevated CK  R74.8 790.5   6. Arthralgia, unspecified joint  M25.50 719.40   7. Ankylosing spondylitis of cervical region  M45.2 720.0     Patient Active Problem List   Diagnosis    Ankylosing spondylitis of cervical region    Recent unexplained weight loss    Abnormal serum lipase level    Abnormal serum level of amylase    Hypertension    Boil    Immobility    Fall from bed    Tetrahydrocannabinol (THC) use disorder, mild, abuse    Generalized pain        Grief    DISH (disseminated idiopathic skeletal hyperostosis)    Generalized weakness    Severe malnutrition     Past Medical History:   Diagnosis Date    Abscess of scrotum     Arthritis     AS (ankylosing spondylitis)     Hypertension     Tetrahydrocannabinol (THC) use disorder, mild, abuse 2023    Uveitis      Past Surgical History:   Procedure Laterality Date    COLONOSCOPY      ROTATOR CUFF REPAIR Right     TOTAL HIP ARTHROPLASTY Left       General Information       Row Name 23 1018          Physical Therapy Time and Intention    Document Type therapy note (daily note)  -RD     Mode of Treatment physical therapy  -RD       Row Name 23 1018          Safety Issues, Functional Mobility    Safety Issues Affecting Function (Mobility) insight into deficits/self-awareness;judgment;positioning of assistive device;safety precautions follow-through/compliance  -RD     Impairments Affecting Function (Mobility) balance;coordination;endurance/activity tolerance;range of motion (ROM);strength  -RD               User Key  (r) = Recorded By, (t) = Taken By, (c) = Cosigned By      Initials Name Provider Type    Mariposa Lea  M, PT Physical Therapist                   Mobility       Row Name 12/26/23 1019          Bed Mobility    Supine-Sit [place] (Bed Mobility) minimum assist (75% patient effort)  -RD     Assistive Device (Bed Mobility) head of bed elevated  -RD       Row Name 12/26/23 1019          Sit-Stand Transfer    Sit-Stand [place] (Transfers) contact guard  -RD     Assistive Device (Sit-Stand Transfers) walker, front-wheeled  -RD       Row Name 12/26/23 1019          Gait/Stairs (Locomotion)    [place] Level (Gait) minimum assist (75% patient effort)  -RD     Assistive Device (Gait) walker, front-wheeled  -RD     Distance in Feet (Gait) 30  -RD     Deviations/Abnormal Patterns (Gait) gait speed decreased;stride length decreased;base of support, narrow  -RD     Bilateral Gait Deviations forward flexed posture;heel strike decreased  -RD     Comment, (Gait/Stairs) Attempted amb with and w/o asst device.  -RD               User Key  (r) = Recorded By, (t) = Taken By, (c) = Cosigned By      Initials Name Provider Type    Mariposa Lea, PT Physical Therapist                   Obj/Interventions       Row Name 12/26/23 1022          Motor Skills    Motor Skills coordination  -RD     Coordination gross motor deficit;minimal impairment  -RD       Row Name 12/26/23 1022          Balance    Static Standing Balance contact guard  -RD     Dynamic Standing Balance minimal assist  -RD     Balance Interventions standing  bkwds amb, side stepping l and r all 10'; crossovers 3 reps ea R and L.  Unable to do cross behinds  -RD     Comment, Balance Min asst req'd anytime no walker was present.  -RD               User Key  (r) = Recorded By, (t) = Taken By, (c) = Cosigned By      Initials Name Provider Type    Mariposa Lea, PT Physical Therapist                   Goals/Plan       Row Name 12/26/23 1028          Transfer Goal 1 (PT)    Time Frame (Transfer Goal 1, PT) 1 week  -RD       Row Name 12/26/23 1028           Gait Training Goal 1 (PT)    Time Frame (Gait Training Goal 1, PT) 1 week  -RD               User Key  (r) = Recorded By, (t) = Taken By, (c) = Cosigned By      Initials Name Provider Type    Mariposa Lea, PT Physical Therapist                   Clinical Impression       Row Name 12/26/23 1024          Pain    Pretreatment Pain Rating 0/10 - no pain  -RD     Posttreatment Pain Rating 0/10 - no pain  -RD       Row Name 12/26/23 1024          Plan of Care Review    Plan of Care Reviewed With patient  -RD     Progress improving  -RD     Outcome Evaluation Informed patient would have no assistance at home if dc'd to home.  Patient concerned about returning home alone.  Patient was unable to get OOB without asking and needing min assistance to transition sup to sit; Patient demonstrates significant decreased balance in high level balance activities which patient would need to use to perform indep at home.  Agree that snf stay to continue therapy prior to returning home indep would benefit patient and increase safety and decrease risk of fall or future injury.  PT appropriate to continue work on balance activities as well as indep household activities.  -RD       Row Name 12/26/23 1024          Therapy Assessment/Plan (PT)    Rehab Potential (PT) good, to achieve stated therapy goals  -RD     Criteria for Skilled Interventions Met (PT) yes  -RD       Row Name 12/26/23 1024          Positioning and Restraints    In Chair sitting;call light within reach;encouraged to call for assist;exit alarm on  -RD               User Key  (r) = Recorded By, (t) = Taken By, (c) = Cosigned By      Initials Name Provider Type    Mariposa Lea, PT Physical Therapist                   Outcome Measures       Row Name 12/26/23 1028          How much help from another person do you currently need...    Turning from your back to your side while in flat bed without using bedrails? 4  -RD     Moving from lying on back to sitting on  the side of a flat bed without bedrails? 3  -RD     Moving to and from a bed to a chair (including a wheelchair)? 3  -RD     Standing up from a chair using your arms (e.g., wheelchair, bedside chair)? 3  -RD     Climbing 3-5 steps with a railing? 2  -RD     To walk in hospital room? 3  -RD     AM-PAC 6 Clicks Score (PT) 18  -RD     Highest Level of Mobility Goal 6 --> Walk 10 steps or more  -RD       Row Name 12/26/23 1028          Functional Assessment    Outcome Measure Options AM-PAC 6 Clicks Basic Mobility (PT)  -RD               User Key  (r) = Recorded By, (t) = Taken By, (c) = Cosigned By      Initials Name Provider Type    Mariposa Lea, PT Physical Therapist                                 Physical Therapy Education       Title: PT OT SLP Therapies (In Progress)       Topic: Physical Therapy (Done)       Point: Mobility training (Done)       Learning Progress Summary             Patient Acceptance, E,TB, VU by KRISTEN at 12/26/2023 1029    Acceptance, E,TB, VU by  at 12/23/2023 1618    Eager, E,TB,D, VU,NR by AMELIA at 12/22/2023 1700    Acceptance, E, VU by CLARISSA at 12/21/2023 1713                         Point: Home exercise program (Done)       Learning Progress Summary             Patient Acceptance, E,TB, VU by KRISTEN at 12/26/2023 1029    Acceptance, E,TB, VU by  at 12/23/2023 1618    Eager, E,TB,D, VU,NR by AMELIA at 12/22/2023 1700    Acceptance, E, VU by CLARISSA at 12/21/2023 1713                         Point: Body mechanics (Done)       Learning Progress Summary             Patient Acceptance, E,TB, VU by KRISTEN at 12/26/2023 1029    Acceptance, E,TB, VU by  at 12/23/2023 1618    Eager, E,TB,D, VU,NR by AMELIA at 12/22/2023 1700    Acceptance, E, VU by CLARISSA at 12/21/2023 1713                         Point: Precautions (Done)       Learning Progress Summary             Patient Acceptance, E,TB, VU by KRISTEN at 12/26/2023 1029    Acceptance, E,TB, VU by  at 12/23/2023 1618    Eager, E,TB,D, VU,NR by AMELIA at 12/22/2023 1701     Acceptance, E, VU by DB at 12/21/2023 1713                                         User Key       Initials Effective Dates Name Provider Type Discipline    RD 06/16/21 -  Mariposa Kennedy, PT Physical Therapist PT    AMELIA 03/07/18 -  Rosa Portillo, PTA Physical Therapist Assistant PT    CLARISSA 06/16/21 -  Felipa Weathers, PT Physical Therapist PT     05/24/23 -  Leobardo Weller, PT Physical Therapist PT                  PT Recommendation and Plan     Plan of Care Reviewed With: patient  Progress: improving  Outcome Evaluation: Informed patient would have no assistance at home if dc'd to home.  Patient concerned about returning home alone.  Patient was unable to get OOB without asking and needing min assistance to transition sup to sit; Patient demonstrates significant decreased balance in high level balance activities which patient would need to use to perform indep at home.  Agree that snf stay to continue therapy prior to returning home indep would benefit patient and increase safety and decrease risk of fall or future injury.  PT appropriate to continue work on balance activities as well as indep household activities.     Time Calculation:         PT Charges       Row Name 12/26/23 1030             Time Calculation    Start Time 0950  -RD      Stop Time 1020  -RD      Time Calculation (min) 30 min  -RD      PT Received On 12/26/23  -RD      PT - Next Appointment 12/27/23  -RD      PT Goal Re-Cert Due Date 01/02/24  -RD                User Key  (r) = Recorded By, (t) = Taken By, (c) = Cosigned By      Initials Name Provider Type    RD Mariposa Kennedy, PT Physical Therapist                  Therapy Charges for Today       Code Description Service Date Service Provider Modifiers Qty    16401069137 HC GAIT TRAINING EA 15 MIN 12/26/2023 Mariposa Kennedy, PT GP 1    35656759638 HC PT THERAPEUTIC ACT EA 15 MIN 12/26/2023 Mariposa eKnnedy, PT GP 1            PT G-Codes  Outcome Measure Options: AM-PAC 6  Clicks Basic Mobility (PT)  AM-PAC 6 Clicks Score (PT): 18  AM-PAC 6 Clicks Score (OT): 14  Modified Arthur Scale: 4 - Moderately severe disability.  Unable to walk without assistance, and unable to attend to own bodily needs without assistance.  PT Discharge Summary  Anticipated Discharge Disposition (PT): skilled nursing facility    Mariposa Kennedy, PT  12/26/2023

## 2023-12-27 LAB
ANION GAP SERPL CALCULATED.3IONS-SCNC: 6 MMOL/L (ref 5–15)
BUN SERPL-MCNC: 18 MG/DL (ref 8–23)
BUN/CREAT SERPL: 25.7 (ref 7–25)
CALCIUM SPEC-SCNC: 8.8 MG/DL (ref 8.6–10.5)
CHLORIDE SERPL-SCNC: 103 MMOL/L (ref 98–107)
CO2 SERPL-SCNC: 28 MMOL/L (ref 22–29)
CREAT SERPL-MCNC: 0.7 MG/DL (ref 0.76–1.27)
EGFRCR SERPLBLD CKD-EPI 2021: 93.7 ML/MIN/1.73
GLUCOSE SERPL-MCNC: 89 MG/DL (ref 65–99)
POTASSIUM SERPL-SCNC: 4.1 MMOL/L (ref 3.5–5.2)
SODIUM SERPL-SCNC: 137 MMOL/L (ref 136–145)

## 2023-12-27 PROCEDURE — 80048 BASIC METABOLIC PNL TOTAL CA: CPT | Performed by: HOSPITALIST

## 2023-12-27 PROCEDURE — 97535 SELF CARE MNGMENT TRAINING: CPT

## 2023-12-27 PROCEDURE — 97530 THERAPEUTIC ACTIVITIES: CPT

## 2023-12-27 PROCEDURE — 63710000001 PREDNISONE PER 1 MG: Performed by: HOSPITALIST

## 2023-12-27 PROCEDURE — 25010000002 ENOXAPARIN PER 10 MG: Performed by: HOSPITALIST

## 2023-12-27 PROCEDURE — 63710000001 METHOTREXATE 2.5 MG TABLET: Performed by: INTERNAL MEDICINE

## 2023-12-27 RX ADMIN — ACETAMINOPHEN 1000 MG: 500 TABLET ORAL at 14:00

## 2023-12-27 RX ADMIN — Medication 10 ML: at 20:11

## 2023-12-27 RX ADMIN — SENNOSIDES AND DOCUSATE SODIUM 2 TABLET: 50; 8.6 TABLET ORAL at 20:28

## 2023-12-27 RX ADMIN — ATENOLOL 50 MG: 50 TABLET ORAL at 08:30

## 2023-12-27 RX ADMIN — ACETAMINOPHEN 1000 MG: 500 TABLET ORAL at 08:30

## 2023-12-27 RX ADMIN — Medication 10 ML: at 08:30

## 2023-12-27 RX ADMIN — METHOTREXATE 2.5 MG: 2.5 TABLET ORAL at 10:44

## 2023-12-27 RX ADMIN — ENOXAPARIN SODIUM 40 MG: 100 INJECTION SUBCUTANEOUS at 14:00

## 2023-12-27 RX ADMIN — ACETAMINOPHEN 1000 MG: 500 TABLET ORAL at 20:28

## 2023-12-27 RX ADMIN — SENNOSIDES AND DOCUSATE SODIUM 2 TABLET: 50; 8.6 TABLET ORAL at 08:30

## 2023-12-27 RX ADMIN — LIDOCAINE 2 PATCH: 4 PATCH TOPICAL at 08:31

## 2023-12-27 RX ADMIN — PREDNISONE 20 MG: 20 TABLET ORAL at 08:30

## 2023-12-27 RX ADMIN — PANTOPRAZOLE SODIUM 40 MG: 40 TABLET, DELAYED RELEASE ORAL at 05:27

## 2023-12-27 RX ADMIN — TAMSULOSIN HYDROCHLORIDE 0.4 MG: 0.4 CAPSULE ORAL at 20:27

## 2023-12-27 NOTE — THERAPY TREATMENT NOTE
Patient Name: Anthony Gallegos  : 1945    MRN: [id number]                              Today's Date: 2023       Admit Date: 2023    Visit Dx:     ICD-10-CM ICD-9-CM   1. Muscle pain  M79.10 729.1   2. Dizziness  R42 780.4   3. Immobility  Z74.09 799.89   4. Fall, initial encounter  W19.XXXA E888.9   5. Elevated CK  R74.8 790.5   6. Arthralgia, unspecified joint  M25.50 719.40   7. Ankylosing spondylitis of cervical region  M45.2 720.0     Patient Active Problem List   Diagnosis    Ankylosing spondylitis of cervical region    Recent unexplained weight loss    Abnormal serum lipase level    Abnormal serum level of amylase    Hypertension    Boil    Immobility    Fall from bed    Tetrahydrocannabinol (THC) use disorder, mild, abuse    Generalized pain        Grief    DISH (disseminated idiopathic skeletal hyperostosis)    Generalized weakness    Severe malnutrition     Past Medical History:   Diagnosis Date    Abscess of scrotum     Arthritis     AS (ankylosing spondylitis)     Hypertension     Tetrahydrocannabinol (THC) use disorder, mild, abuse 2023    Uveitis      Past Surgical History:   Procedure Laterality Date    COLONOSCOPY      ROTATOR CUFF REPAIR Right     TOTAL HIP ARTHROPLASTY Left       General Information       Row Name 23 1546          Physical Therapy Time and Intention    Document Type therapy note (daily note)  -LW     Mode of Treatment physical therapy  Co-tx beginning of session, individual at end  -LW       Row Name 23 1546          General Information    Patient Profile Reviewed yes  -LW     Existing Precautions/Restrictions fall  -LW       Row Name 23 1546          Cognition    Orientation Status (Cognition) oriented x 4  -LW       Row Name 23 1546          Safety Issues, Functional Mobility    Impairments Affecting Function (Mobility) balance;coordination;endurance/activity tolerance;range of motion (ROM);strength  -LW               User  Key  (r) = Recorded By, (t) = Taken By, (c) = Cosigned By      Initials Name Provider Type    Ashley Alexander PT Physical Therapist                   Mobility       Row Name 12/27/23 1547          Bed Mobility    Bed Mobility sit-supine  -LW     Supine-Sit [place] (Bed Mobility) minimum assist (75% patient effort)  -LW       Row Name 12/27/23 1547          Sit-Stand Transfer    Sit-Stand [place] (Transfers) minimum assist (75% patient effort)  -LW     Assistive Device (Sit-Stand Transfers) walker, front-wheeled  -LW     Comment, (Sit-Stand Transfer) Chuckie from lower bed height  -LW       Row Name 12/27/23 1547          Gait/Stairs (Locomotion)    [place] Level (Gait) contact guard  -LW     Assistive Device (Gait) walker, front-wheeled  -LW     Distance in Feet (Gait) 40'x2, standing rest break required d/t decreased activity tolerance  -LW     Deviations/Abnormal Patterns (Gait) gait speed decreased;stride length decreased;base of support, narrow  -LW     Bilateral Gait Deviations forward flexed posture;heel strike decreased  -LW               User Key  (r) = Recorded By, (t) = Taken By, (c) = Cosigned By      Initials Name Provider Type    Ashley Alexander PT Physical Therapist                   Obj/Interventions       Row Name 12/27/23 1548          Balance    Balance Assessment sitting static balance;sitting dynamic balance;standing static balance;standing dynamic balance  -LW     Static Sitting Balance standby assist  -LW     Dynamic Sitting Balance standby assist  -LW     Position, Sitting Balance sitting edge of bed  -LW     Static Standing Balance contact guard  -LW     Dynamic Standing Balance minimal assist  -LW     Position/Device Used, Standing Balance walker, front-wheeled  -LW     Balance Interventions sitting;standing;sit to stand  -LW     Comment, Balance Stood ~5 mins while cleaning up and changing with OT; Chuckie to maintain standing balance d/t occasional posterior lean  -LW                User Key  (r) = Recorded By, (t) = Taken By, (c) = Cosigned By      Initials Name Provider Type    Ashley Alexander PT Physical Therapist                   Goals/Plan    No documentation.                  Clinical Impression       Row Name 12/27/23 1549          Pain    Pretreatment Pain Rating 0/10 - no pain  -LW     Posttreatment Pain Rating 0/10 - no pain  -LW       Row Name 12/27/23 1549          Plan of Care Review    Plan of Care Reviewed With patient  -LW     Progress improving  -LW     Outcome Evaluation Pt was agreeable to working with PT. He required Chuckie to stand with rwx from a lower bed height. He stood ~5 mins while assisting OT with cleaning up/changing after a urinal spill, requiring Chuckie to maintain standing balance. He ambulated 40' x2 with CGA and rwx, requiring a standing rest break d/t activity tolerance. His gait speed was slow but steady. Pt will benefit from continued skilled PT addressing limitations in functional mobility to maximize safety and independence.  -LW       Row Name 12/27/23 1549          Positioning and Restraints    Pre-Treatment Position in bed  -LW     Post Treatment Position chair  -LW     In Chair sitting;call light within reach;encouraged to call for assist;exit alarm on  -LW               User Key  (r) = Recorded By, (t) = Taken By, (c) = Cosigned By      Initials Name Provider Type    Ashley Alexander PT Physical Therapist                   Outcome Measures       Row Name 12/27/23 155          How much help from another person do you currently need...    Turning from your back to your side while in flat bed without using bedrails? 4  -LW     Moving from lying on back to sitting on the side of a flat bed without bedrails? 3  -LW     Moving to and from a bed to a chair (including a wheelchair)? 3  -LW     Standing up from a chair using your arms (e.g., wheelchair, bedside chair)? 3  -LW     Climbing 3-5 steps with a railing? 2  -LW     To walk in hospital room? 3   -LW     AM-PAC 6 Clicks Score (PT) 18  -LW     Highest Level of Mobility Goal 6 --> Walk 10 steps or more  -LW               User Key  (r) = Recorded By, (t) = Taken By, (c) = Cosigned By      Initials Name Provider Type    Ashley Alexander, PT Physical Therapist                                 Physical Therapy Education       Title: PT OT SLP Therapies (In Progress)       Topic: Physical Therapy (Done)       Point: Mobility training (Done)       Learning Progress Summary             Patient Acceptance, E, VU by SHLOMO at 12/27/2023 1554    Acceptance, E,TB, VU by KRISTEN at 12/26/2023 1029    Acceptance, E,TB, VU by  at 12/23/2023 1618    Eager, E,TB,D, VU,NR by AMELIA at 12/22/2023 1700    Acceptance, E, VU by CLARISSA at 12/21/2023 1713                         Point: Home exercise program (Done)       Learning Progress Summary             Patient Acceptance, E,TB, VU by KRISTEN at 12/26/2023 1029    Acceptance, E,TB, VU by  at 12/23/2023 1618    Eager, E,TB,D, VU,NR by AMELIA at 12/22/2023 1700    Acceptance, E, VU by CLARISSA at 12/21/2023 1713                         Point: Body mechanics (Done)       Learning Progress Summary             Patient Acceptance, E, VU by SHLOMO at 12/27/2023 1554    Acceptance, E,TB, VU by KRISTEN at 12/26/2023 1029    Acceptance, E,TB, VU by  at 12/23/2023 1618    Eager, E,TB,D, VU,NR by AMELIA at 12/22/2023 1700    Acceptance, E, VU by CLARISSA at 12/21/2023 1713                         Point: Precautions (Done)       Learning Progress Summary             Patient Acceptance, E, VU by SHLOMO at 12/27/2023 1554    Acceptance, E,TB, VU by KRISTEN at 12/26/2023 1029    Acceptance, E,TB, VU by  at 12/23/2023 1618    Eager, E,TB,D, VU,NR by AMELIA at 12/22/2023 1700    Acceptance, E, VU by CLARISSA at 12/21/2023 1713                                         User Key       Initials Effective Dates Name Provider Type Discipline    KRISTEN 06/16/21 -  Mariposa Kennedy, PT Physical Therapist PT    AMELIA 03/07/18 -  Rosa Portillo PTA Physical Therapist  Assistant PT    DB 06/16/21 -  Felipa Wetahers, PT Physical Therapist PT    LW 05/08/23 -  Ashley Bertrand, PT Physical Therapist PT     05/24/23 -  Leobardo Weller PT Physical Therapist PT                  PT Recommendation and Plan     Plan of Care Reviewed With: patient  Progress: improving  Outcome Evaluation: Pt was agreeable to working with PT. He required Chuckie to stand with rwx from a lower bed height. He stood ~5 mins while assisting OT with cleaning up/changing after a urinal spill, requiring Chuckie to maintain standing balance. He ambulated 40' x2 with CGA and rwx, requiring a standing rest break d/t activity tolerance. His gait speed was slow but steady. Pt will benefit from continued skilled PT addressing limitations in functional mobility to maximize safety and independence.     Time Calculation:         PT Charges       Row Name 12/27/23 1555             Time Calculation    Start Time 1518  -LW      Stop Time 1546  -LW      Time Calculation (min) 28 min  -LW      PT Received On 12/27/23  -LW      PT - Next Appointment 12/28/23  -LW         Time Calculation- PT    Total Timed Code Minutes- PT 23 minute(s)  -LW         Timed Charges    84452 - PT Therapeutic Activity Minutes 23  -LW         Total Minutes    Timed Charges Total Minutes 23  -LW       Total Minutes 23  -LW                User Key  (r) = Recorded By, (t) = Taken By, (c) = Cosigned By      Initials Name Provider Type    Ashley Alexander, PT Physical Therapist                  Therapy Charges for Today       Code Description Service Date Service Provider Modifiers Qty    22523224134 HC PT THERAPEUTIC ACT EA 15 MIN 12/27/2023 Ashley Bertrand, PT GP 2            PT G-Codes  Outcome Measure Options: AM-PAC 6 Clicks Basic Mobility (PT)  AM-PAC 6 Clicks Score (PT): 18  AM-PAC 6 Clicks Score (OT): 14  Modified Luce Scale: 4 - Moderately severe disability.  Unable to walk without assistance, and unable to attend to own bodily needs without  assistance.       Ashley Bertrand, PT  12/27/2023

## 2023-12-27 NOTE — PROGRESS NOTES
[facility]     LOS: 5 days     Subjective:    CC:Leg Pain    DIET:  Diet Order   Procedures    Diet: Cardiac Diets; Healthy Heart (2-3 Na+); Texture: Regular Texture (IDDSI 7); Fluid Consistency: Thin (IDDSI 0)   dizziness  No cp  No soa    Objective:    Vital Signs:  Temp:  [97.5 °F (36.4 °C)-98.6 °F (37 °C)] 97.9 °F (36.6 °C)  Heart Rate:  [47-62] 58  Resp:  [17-18] 18  BP: (107-114)/(43-66) 114/43    SpO2:  [96 %-98 %] 96 %  on   ;   Device (Oxygen Therapy): room air  Body mass index is 22.32 kg/m².    Physical Exam  Constitutional:       Appearance: Normal appearance.   HENT:      Head: Normocephalic and atraumatic.   Cardiovascular:      Rate and Rhythm: Normal rate and regular rhythm.      Heart sounds: No murmur heard.     No friction rub.   Pulmonary:      Effort: Pulmonary effort is normal.      Breath sounds: Normal breath sounds.   Abdominal:      General: Bowel sounds are normal. There is no distension.      Palpations: Abdomen is soft.      Tenderness: There is no abdominal tenderness.   Skin:     General: Skin is warm and dry.   Neurological:      Mental Status: He is alert.   Psychiatric:         Mood and Affect: Mood normal.         Behavior: Behavior normal.         Results Review:    Glucose   Date Value Ref Range Status   12/27/2023 89 65 - 99 mg/dL Final   12/25/2023 89 65 - 99 mg/dL Final     Results from last 7 days   Lab Units 12/23/23  0750   WBC 10*3/mm3 6.23   HEMOGLOBIN g/dL 12.2*   HEMATOCRIT % 36.2*   PLATELETS 10*3/mm3 150     Results from last 7 days   Lab Units 12/27/23  0602 12/25/23  0608   SODIUM mmol/L 137 138   POTASSIUM mmol/L 4.1 3.8   CHLORIDE mmol/L 103 103   CO2 mmol/L 28.0 27.0   BUN mg/dL 18 17   CREATININE mg/dL 0.70* 0.66*   CALCIUM mg/dL 8.8 8.5*   BILIRUBIN mg/dL  --  0.4   ALK PHOS U/L  --  36*   ALT (SGPT) U/L  --  24   AST (SGOT) U/L  --  33   GLUCOSE mg/dL 89 89           Results from last 7 days   Lab Units 12/21/23  0732   MAGNESIUM  "mg/dL 2.1     Results from last 7 days   Lab Units 12/22/23  0602 12/21/23  0732 12/20/23  1807   CK TOTAL U/L 844* 940* 1,062*     Cultures:  No results found for: \"BLOODCX\", \"URINECX\", \"WOUNDCX\", \"MRSACX\", \"RESPCX\", \"STOOLCX\"    I have reviewed daily medications and changes in CPOE    Scheduled meds  acetaminophen, 1,000 mg, Oral, TID  atenolol, 50 mg, Oral, Q24H  enoxaparin, 40 mg, Subcutaneous, Q24H  Lidocaine, 2 patch, Transdermal, Q24H  methotrexate, 2.5 mg, Oral, Once per day on Wed Thu  pantoprazole, 40 mg, Oral, Q AM  senna-docusate sodium, 2 tablet, Oral, BID  sodium chloride, 10 mL, Intravenous, Q12H  tamsulosin, 0.4 mg, Oral, Nightly           PRN meds    acetaminophen **OR** acetaminophen **OR** acetaminophen    senna-docusate sodium **AND** polyethylene glycol **AND** bisacodyl **AND** bisacodyl    calcium carbonate    Calcium Replacement - Follow Nurse / BPA Driven Protocol    Magnesium Standard Dose Replacement - Follow Nurse / BPA Driven Protocol    melatonin    muscle rub    ondansetron ODT **OR** ondansetron    Phosphorus Replacement - Follow Nurse / BPA Driven Protocol    Potassium Replacement - Follow Nurse / BPA Driven Protocol    [COMPLETED] Insert Peripheral IV **AND** sodium chloride    sodium chloride    sodium chloride    traMADol        Generalized weakness    Ankylosing spondylitis of cervical region    Hypertension    Immobility    Fall from bed    Tetrahydrocannabinol (THC) use disorder, mild, abuse    Generalized pain        Grief    DISH (disseminated idiopathic skeletal hyperostosis)    Severe malnutrition        Assessment/Plan:      78 y.o. male admitted with Generalized weakness. Fell on to the floor and unable to get up and was down for 2-3 hours        12/25/23  PT originally recommended SNF however pt has improved and now recommending HHPT/OT. Unfortunately pt has no place to go, CCP consulted for assistance with discharge.      Generalized weakness  Immobility  Fall " from bed  -Multifactorial secondary to age and chronic comorbidities.  -PT/OT rec SNF   -weakness is much better    Generalized pain  Disseminated idiopathic skeletal hyperostosis  Ankylosing spondylitis  -X-ray of lumbar spine with diffuse ankylosis without any obvious injury or fracture.  -methotrexate  -PO prednisone for pain     Urinary retention  Constipation  -catheter is out.  Flomax added.  -Urology evaluated, follow-up in 2 weeks with voiding trial     Elevated CK, resolved  Lactic acidosis, resolved  -downtrending, related to dehydration     Hypertension  -Atenolol; hold HCTZ- resume when appropriate     Note reviewed    D/w ccp    Shola Brady MD  12/27/23  17:17 EST

## 2023-12-27 NOTE — PLAN OF CARE
Goal Outcome Evaluation:  Plan of Care Reviewed With: patient           Outcome Evaluation: Pt was found supine in bed, agreeable to OOB ax. Focus on ADLs as pt was noted to be saturated in urine, states he has been using the urinal however he is soiled and aparently has not asked for assist to clean up. Malgorzata for gown change, brief change and set-up/sba to wipe off/sponge bath audra area and legs. Malgorzata for balance in standing with PT. Pt performs fxl ambulation around room with CGA using RW. Pt left ambulating in hallway with PT. OT will cont to follow

## 2023-12-27 NOTE — PROGRESS NOTES
"Nutrition Services    Patient Name:  Anthony Gallegos  YOB: 1945  MRN: [id number]  Admit Date:  12/20/2023    Assessment Date:  12/27/23    Summary: Follow up     Nutrition follow up completed. Visited pt at bedside. Eating well, 75% per EMR. Drinks the Ensures. Asking for another one at time of visit. Provided strawberry Ensure Plus to pt. Discharge planning in progress.   Meds: pericolace, PPI  Labs: Creat 0.70  Skin: left upper back and bilateral coccyx pressure injuries, skin tear    REC:  Continue Ensure Compact vanilla BID B/D for additional calories/protein and to support PO intake, wt gn, and wound healing    Will CTM PO/ONS intake     RD to follow per protocol.    CLINICAL NUTRITION ASSESSMENT      Reason for Assessment Follow up protocol      Diagnosis/Problem   Immobility    Medical/Surgical History Past Medical History:   Diagnosis Date    Abscess of scrotum     Arthritis     AS (ankylosing spondylitis)     Hypertension     Tetrahydrocannabinol (THC) use disorder, mild, abuse 12/20/2023    Uveitis        Past Surgical History:   Procedure Laterality Date    COLONOSCOPY      ROTATOR CUFF REPAIR Right     TOTAL HIP ARTHROPLASTY Left 2014        Anthropometrics        Current Height  Current Weight  BMI kg/m2 Height: 190.5 cm (75\")  Weight: 85.4 kg (188 lb 4.4 oz) (12/27/23 0423)  Body mass index is 23.53 kg/m².   Adjusted BMI (if applicable)    BMI Category Normal/Healthy (18.4 - 24.9)   Ideal Body Weight (IBW) 84.5 kg    Usual Body Weight (UBW) 220 lbs    Weight Trend Loss 41 lbs wt loss x 5 yrs    Weight History Wt Readings from Last 30 Encounters:   12/27/23 0423 85.4 kg (188 lb 4.4 oz)   12/25/23 0707 85.2 kg (187 lb 13.3 oz)   12/23/23 0608 81.3 kg (179 lb 3.7 oz)   12/22/23 0430 81.8 kg (180 lb 5.4 oz)   12/20/23 1005 81.9 kg (180 lb 8 oz)   03/27/18 1439 100 kg (221 lb)   03/22/18 1613 101 kg (222 lb 12.8 oz)   01/23/18 1414 101 kg (223 lb)   12/26/17 1111 99.8 kg (220 lb)   10/19/17 " "1239 95.3 kg (210 lb)   08/30/17 1459 91.3 kg (201 lb 3.2 oz)   08/29/17 1221 93 kg (205 lb)   08/10/17 1517 88 kg (194 lb)   07/14/17 1041 85.9 kg (189 lb 6.4 oz)   06/29/17 1318 84.5 kg (186 lb 3.2 oz)   06/21/17 1922 90.7 kg (200 lb)        Estimated Requirements         Weight used  81.9 kg     Calories  0144-9109 (25 kcal/kg, 30 kcal/kg)    Protein  82-98 (1.0 - 1.2 gm/kg)    Fluid  1 mL/kcal      Labs       Pertinent Labs    Results from last 7 days   Lab Units 12/27/23  0602 12/25/23  0608 12/24/23  0638 12/23/23  0750   SODIUM mmol/L 137 138 137 135*   POTASSIUM mmol/L 4.1 3.8 3.9 4.0   CHLORIDE mmol/L 103 103 104 102   CO2 mmol/L 28.0 27.0 26.4 22.0   BUN mg/dL 18 17 16 16   CREATININE mg/dL 0.70* 0.66* 0.64* 0.79   CALCIUM mg/dL 8.8 8.5* 8.6 8.6   BILIRUBIN mg/dL  --  0.4 0.3 0.3   ALK PHOS U/L  --  36* 36* 37*   ALT (SGPT) U/L  --  24 16 18   AST (SGOT) U/L  --  33 34 42*   GLUCOSE mg/dL 89 89 81 90     Results from last 7 days   Lab Units 12/25/23  0608 12/24/23  0638 12/23/23  0750 12/22/23  0602 12/21/23  0732   MAGNESIUM mg/dL  --   --   --   --  2.1   HEMOGLOBIN g/dL  --   --  12.2*   < > 11.0*   HEMATOCRIT %  --   --  36.2*   < > 33.3*   WBC 10*3/mm3  --   --  6.23   < > 3.74   ALBUMIN g/dL 3.2*   < > 3.2*   < > 2.9*    < > = values in this interval not displayed.     Results from last 7 days   Lab Units 12/23/23  0750 12/22/23  0602 12/21/23  0732   PLATELETS 10*3/mm3 150 152 149     No results found for: \"COVID19\"  No results found for: \"HGBA1C\"       Medications           Scheduled Medications acetaminophen, 1,000 mg, Oral, TID  atenolol, 50 mg, Oral, Q24H  enoxaparin, 40 mg, Subcutaneous, Q24H  Lidocaine, 2 patch, Transdermal, Q24H  methotrexate, 2.5 mg, Oral, Once per day on Wed Thu  pantoprazole, 40 mg, Oral, Q AM  senna-docusate sodium, 2 tablet, Oral, BID  sodium chloride, 10 mL, Intravenous, Q12H  tamsulosin, 0.4 mg, Oral, Nightly       Infusions       PRN Medications   acetaminophen **OR** " acetaminophen **OR** acetaminophen    senna-docusate sodium **AND** polyethylene glycol **AND** bisacodyl **AND** bisacodyl    calcium carbonate    Calcium Replacement - Follow Nurse / BPA Driven Protocol    Magnesium Standard Dose Replacement - Follow Nurse / BPA Driven Protocol    melatonin    muscle rub    ondansetron ODT **OR** ondansetron    Phosphorus Replacement - Follow Nurse / BPA Driven Protocol    Potassium Replacement - Follow Nurse / BPA Driven Protocol    [COMPLETED] Insert Peripheral IV **AND** sodium chloride    sodium chloride    sodium chloride    traMADol     Physical Findings          General Findings alert, frail, generalized wasting, loss of muscle mass, loss of subcutaneous fat, oriented, room air   Oral/Mouth Cavity tooth or teeth missing   Edema  lower extremity , 2+ (mild)   Gastrointestinal constipation, non-distended , normoactive, last bowel movement: 12/25   Skin  pressure injury: left upper back, bilateral coccyx , skin tear   Tubes/Drains/Lines none   NFPE See Malnutrition Severity Assessment, Date Completed: 12/22     Malnutrition Severity Assessment      Patient meets criteria for : Severe Malnutrition (Pt meets ASPEN/AND criteria for nutrition dx of severe malnutrition of chronic disease related to muscle wasting, fat loss, fluid accumulation, and declining functional status.)       Current Nutrition Orders & Evaluation of Intake       Oral Nutrition     Food Allergies NKFA   Current PO Diet Diet: Cardiac Diets; Healthy Heart (2-3 Na+); Texture: Regular Texture (IDDSI 7); Fluid Consistency: Thin (IDDSI 0)   Supplement Ensure Compact vanilla BID B/D   PO Evaluation     % PO Intake 75%    Factors Affecting Intake: emotional status, pain issues, weakness   --  PES STATEMENT / NUTRITION DIAGNOSIS      Nutrition Dx Problem  Problem: Unintentional Weight Loss  Etiology: Medical Diagnosis - immobility, , grief and Factors Affecting Nutrition -  emotional status, weakness, pain  issues     Signs/Symptoms: Unintended Weight Change     NUTRITION INTERVENTION / PLAN OF CARE      Intervention Goal(s) Maintain nutrition status, Reduce/improve symptoms, Meet estimated needs, Disease management/therapy, Maintain intake, Maintain weight, No significant weight loss, and PO intake goal %: 75%         RD Intervention/Action Supplement provided, Encourage intake, Continue to monitor, and Care plan reviewed   --      Prescription/Orders:       PO Diet       Supplements Ensure Compact vanilla BID B/D      Enteral Nutrition       Parenteral Nutrition    New Prescription Ordered? Continue same per protocol, No changes at this time   --      Monitor/Evaluation Per protocol   Discharge Plan/Needs Pending clinical course   --    RD to follow per protocol.      Electronically signed by:  Rizwana Lugo RD  12/27/23 10:50 EST

## 2023-12-27 NOTE — THERAPY TREATMENT NOTE
Patient Name: Anthony Gallegos  : 1945    MRN: [id number]                              Today's Date: 2023       Admit Date: 2023    Visit Dx:     ICD-10-CM ICD-9-CM   1. Muscle pain  M79.10 729.1   2. Dizziness  R42 780.4   3. Immobility  Z74.09 799.89   4. Fall, initial encounter  W19.XXXA E888.9   5. Elevated CK  R74.8 790.5   6. Arthralgia, unspecified joint  M25.50 719.40   7. Ankylosing spondylitis of cervical region  M45.2 720.0     Patient Active Problem List   Diagnosis    Ankylosing spondylitis of cervical region    Recent unexplained weight loss    Abnormal serum lipase level    Abnormal serum level of amylase    Hypertension    Boil    Immobility    Fall from bed    Tetrahydrocannabinol (THC) use disorder, mild, abuse    Generalized pain        Grief    DISH (disseminated idiopathic skeletal hyperostosis)    Generalized weakness    Severe malnutrition     Past Medical History:   Diagnosis Date    Abscess of scrotum     Arthritis     AS (ankylosing spondylitis)     Hypertension     Tetrahydrocannabinol (THC) use disorder, mild, abuse 2023    Uveitis      Past Surgical History:   Procedure Laterality Date    COLONOSCOPY      ROTATOR CUFF REPAIR Right     TOTAL HIP ARTHROPLASTY Left       General Information       Row Name 23 1635          OT Time and Intention    Document Type therapy note (daily note)  -     Mode of Treatment occupational therapy  partial co-tx with PT  -       Row Name 23 1635          Cognition    Orientation Status (Cognition) oriented x 4  -       Row Name 23 1635          Safety Issues, Functional Mobility    Impairments Affecting Function (Mobility) balance;coordination;endurance/activity tolerance;range of motion (ROM);strength  -               User Key  (r) = Recorded By, (t) = Taken By, (c) = Cosigned By      Initials Name Provider Type    Kate Cohn OT Occupational Therapist                     Mobility/ADL's        Row Name 12/27/23 1636          Bed Mobility    Bed Mobility sit-supine  -     Supine-Sit Harvey (Bed Mobility) minimum assist (75% patient effort)  -       Row Name 12/27/23 1636          Transfers    Transfers sit-stand transfer  -       Row Name 12/27/23 1636          Sit-Stand Transfer    Sit-Stand Harvey (Transfers) minimum assist (75% patient effort);verbal cues  -     Assistive Device (Sit-Stand Transfers) walker, front-wheeled  -       Row Name 12/27/23 1636          Activities of Daily Living    BADL Assessment/Intervention upper body dressing;toileting  -[facility] Name 12/27/23 1636          Lower Body Dressing Assessment/Training    Harvey Level (Lower Body Dressing) lower body dressing skills;shoes/slippers  -     Position (Lower Body Dressing) edge of bed sitting  -     Comment, (Lower Body Dressing) assist to change brief as it is saturated in urine  -[facility] Name 12/27/23 1636          Upper Body Dressing Assessment/Training    Harvey Level (Upper Body Dressing) don;front opening garment;minimum assist (75% patient effort)  -     Comment, (Upper Body Dressing) gown  -               User Key  (r) = Recorded By, (t) = Taken By, (c) = Cosigned By      Initials Name Provider Type    Kate Cohn OT Occupational Therapist                   Obj/Interventions       [facility] Name 12/27/23 1637          Balance    Balance Interventions occupation based/functional task  -               User Key  (r) = Recorded By, (t) = Taken By, (c) = Cosigned By      Initials Name Provider Type     Kate Hickman OT Occupational Therapist                   Goals/Plan    No documentation.                  Clinical Impression       Row Name 12/27/23 1637          Pain Assessment    Pretreatment Pain Rating 0/10 - no pain  -     Posttreatment Pain Rating 0/10 - no pain  -       Row Name 12/27/23 1637          Plan of Care Review    Plan of Care Reviewed With patient  -      Outcome Evaluation Pt was found supine in bed, agreeable to OOB ax. Focus on ADLs as pt was noted to be saturated in urine, states he has been using the urinal however he is soiled and aparently has not asked for assist to clean up. Malgorzata for gown change, brief change and set-up/sba to wipe off/sponge bath audra area and legs. Malgorzata for balance in standing with PT. Pt performs fxl ambulation around room with CGA using RW. Pt left ambulating in hallway with PT. OT will cont to follow  -JW       Row Name 12/27/23 3783          Positioning and Restraints    Pre-Treatment Position in bed  -JW     Post Treatment Position other  -JW     Other Position with PT  -JW               User Key  (r) = Recorded By, (t) = Taken By, (c) = Cosigned By      Initials Name Provider Type    Kate Cohn OT Occupational Therapist                   Outcome Measures       Row Name 12/27/23 6348          How much help from another person do you currently need...    Turning from your back to your side while in flat bed without using bedrails? 4  -LW     Moving from lying on back to sitting on the side of a flat bed without bedrails? 3  -LW     Moving to and from a bed to a chair (including a wheelchair)? 3  -LW     Standing up from a chair using your arms (e.g., wheelchair, bedside chair)? 3  -LW     Climbing 3-5 steps with a railing? 2  -LW     To walk in hospital room? 3  -LW     AM-PAC 6 Clicks Score (PT) 18  -LW     Highest Level of Mobility Goal 6 --> Walk 10 steps or more  -LW               User Key  (r) = Recorded By, (t) = Taken By, (c) = Cosigned By      Initials Name Provider Type    Ashley Alexander PT Physical Therapist                    Occupational Therapy Education       Title: PT OT SLP Therapies (In Progress)       Topic: Occupational Therapy (Not Started)       Point: ADL training (Not Started)       Description:   Instruct learner(s) on proper safety adaptation and remediation techniques during self care or transfers.    Instruct in proper use of assistive devices.                  Learner Progress:  Not documented in this visit.              Point: Home exercise program (Not Started)       Description:   Instruct learner(s) on appropriate technique for monitoring, assisting and/or progressing therapeutic exercises/activities.                  Learner Progress:  Not documented in this visit.              Point: Precautions (Not Started)       Description:   Instruct learner(s) on prescribed precautions during self-care and functional transfers.                  Learner Progress:  Not documented in this visit.              Point: Body mechanics (Not Started)       Description:   Instruct learner(s) on proper positioning and spine alignment during self-care, functional mobility activities and/or exercises.                  Learner Progress:  Not documented in this visit.                                  OT Recommendation and Plan     Plan of Care Review  Plan of Care Reviewed With: patient  Outcome Evaluation: Pt was found supine in bed, agreeable to OOB ax. Focus on ADLs as pt was noted to be saturated in urine, states he has been using the urinal however he is soiled and aparently has not asked for assist to clean up. Malgorzata for gown change, brief change and set-up/sba to wipe off/sponge bath audra area and legs. Malgorzata for balance in standing with PT. Pt performs fxl ambulation around room with CGA using RW. Pt left ambulating in hallway with PT. OT will cont to follow     Time Calculation:         Time Calculation- OT       Row Name 12/27/23 1640             Time Calculation- OT    OT Start Time 1620  -      OT Stop Time 1633  -      OT Time Calculation (min) 13 min  -      Total Timed Code Minutes- OT 13 minute(s)  -      OT Received On 12/27/23  -      OT - Next Appointment 12/28/23  -         Timed Charges    76930 - OT Self Care/Mgmt Minutes 13  -         Total Minutes    Timed Charges Total Minutes 13  -        Total Minutes 13  -JW                User Key  (r) = Recorded By, (t) = Taken By, (c) = Cosigned By      Initials Name Provider Type    Kate Cohn OT Occupational Therapist                  Therapy Charges for Today       Code Description Service Date Service Provider Modifiers Qty    [id number]  OT SELF CARE/MGMT/TRAIN EA 15 MIN 12/27/2023 Kate Hickman OT GO 1                 Kate Hickman OT  12/27/2023

## 2023-12-27 NOTE — PLAN OF CARE
Goal Outcome Evaluation:  Plan of Care Reviewed With: patient        Progress: improving  Outcome Evaluation: Pt was agreeable to working with PT. He required Chuckie to stand with rwx from a lower bed height. He stood ~5 mins while assisting OT with cleaning up/changing after a urinal spill, requiring Chuckie to maintain standing balance. He ambulated 40' x2 with CGA and rwx, requiring a standing rest break d/t activity tolerance. His gait speed was slow but steady. Pt will benefit from continued skilled PT addressing limitations in functional mobility to maximize safety and independence.

## 2023-12-28 PROCEDURE — 25010000002 ENOXAPARIN PER 10 MG: Performed by: HOSPITALIST

## 2023-12-28 PROCEDURE — 97110 THERAPEUTIC EXERCISES: CPT

## 2023-12-28 PROCEDURE — 63710000001 METHOTREXATE 2.5 MG TABLET: Performed by: INTERNAL MEDICINE

## 2023-12-28 RX ADMIN — SENNOSIDES AND DOCUSATE SODIUM 2 TABLET: 50; 8.6 TABLET ORAL at 09:20

## 2023-12-28 RX ADMIN — SENNOSIDES AND DOCUSATE SODIUM 2 TABLET: 50; 8.6 TABLET ORAL at 21:12

## 2023-12-28 RX ADMIN — ACETAMINOPHEN 1000 MG: 500 TABLET ORAL at 14:18

## 2023-12-28 RX ADMIN — TAMSULOSIN HYDROCHLORIDE 0.4 MG: 0.4 CAPSULE ORAL at 21:12

## 2023-12-28 RX ADMIN — METHOTREXATE 2.5 MG: 2.5 TABLET ORAL at 09:20

## 2023-12-28 RX ADMIN — ATENOLOL 50 MG: 50 TABLET ORAL at 09:20

## 2023-12-28 RX ADMIN — ENOXAPARIN SODIUM 40 MG: 100 INJECTION SUBCUTANEOUS at 14:17

## 2023-12-28 RX ADMIN — Medication 10 ML: at 09:25

## 2023-12-28 RX ADMIN — ACETAMINOPHEN 1000 MG: 500 TABLET ORAL at 09:20

## 2023-12-28 RX ADMIN — Medication 10 ML: at 20:21

## 2023-12-28 RX ADMIN — LIDOCAINE 2 PATCH: 4 PATCH TOPICAL at 09:20

## 2023-12-28 RX ADMIN — PANTOPRAZOLE SODIUM 40 MG: 40 TABLET, DELAYED RELEASE ORAL at 05:36

## 2023-12-28 RX ADMIN — ACETAMINOPHEN 1000 MG: 500 TABLET ORAL at 21:12

## 2023-12-28 NOTE — PROGRESS NOTES
"    Name: Anthony Gallegos ADMIT: 2023   : 1945  PCP: Marco Macedo MD    MRN: [id number] LOS: 6 days   AGE/SEX: 78 y.o. male  ROOM: [facility] [place]     Subjective   Subjective   Patient denies any complaints. No chest pain or shortness of breath. \"I feel just fine\"     Objective   Objective   Vital Signs  Temp:  [97.7 °F (36.5 °C)-98.8 °F (37.1 °C)] 98.8 °F (37.1 °C)  Heart Rate:  [51-84] 84  Resp:  [16-18] 16  BP: ()/(46-73) 92/46  SpO2:  [97 %-99 %] 98 %  on   ;   Device (Oxygen Therapy): room air  Body mass index is 23.53 kg/m².    Physical Exam  Constitutional:       General: He is not in acute distress.     Appearance: Normal appearance. He is not toxic-appearing.   HENT:      Head: Normocephalic and atraumatic.   Cardiovascular:      Rate and Rhythm: Normal rate and regular rhythm.   Pulmonary:      Effort: Pulmonary effort is normal. No respiratory distress.      Breath sounds: Normal breath sounds.   Abdominal:      General: Bowel sounds are normal.      Palpations: Abdomen is soft.      Tenderness: There is no abdominal tenderness.   Musculoskeletal:         General: No swelling.   Skin:     General: Skin is warm and dry.   Neurological:      General: No focal deficit present.      Mental Status: He is alert.   Psychiatric:         Mood and Affect: Mood normal.         Behavior: Behavior normal.     Results Review  I reviewed the patient's new clinical results.  Results from last 7 days   Lab Units 23  0750 23  0602   WBC 10*3/mm3 6.23 3.91   HEMOGLOBIN g/dL 12.2* 11.6*   PLATELETS 10*3/mm3 150 152     Results from last 7 days   Lab Units 23  0602 23  0608 23  0638 23  0750   SODIUM mmol/L 137 138 137 135*   POTASSIUM mmol/L 4.1 3.8 3.9 4.0   CHLORIDE mmol/L 103 103 104 102   CO2 mmol/L 28.0 27.0 26.4 22.0   BUN mg/dL 18 17 16 16   CREATININE mg/dL 0.70* 0.66* 0.64* 0.79   GLUCOSE mg/dL 89 89 81 90     Lab Results   Component Value Date    ANIONGAP 6.0 " "12/27/2023     Estimated Creatinine Clearance: 103.4 mL/min (A) (by C-G formula based on SCr of 0.7 mg/dL (L)).   Lab Results   Component Value Date    EGFR 93.7 12/27/2023     Results from last 7 days   Lab Units 12/25/23  0608 12/24/23  0638 12/23/23  0750 12/22/23  0602   ALBUMIN g/dL 3.2* 3.0* 3.2* 2.9*   BILIRUBIN mg/dL 0.4 0.3 0.3 0.3   ALK PHOS U/L 36* 36* 37* 34*   AST (SGOT) U/L 33 34 42* 46*   ALT (SGPT) U/L 24 16 18 18     Results from last 7 days   Lab Units 12/27/23  0602 12/25/23  0608 12/24/23  0638 12/23/23  0750 12/22/23  0602   CALCIUM mg/dL 8.8 8.5* 8.6 8.6 8.0*   ALBUMIN g/dL  --  3.2* 3.0* 3.2* 2.9*       No results found for: \"HGBA1C\", \"POCGLU\"    No radiology results for the last day    Scheduled Meds  acetaminophen, 1,000 mg, Oral, TID  atenolol, 50 mg, Oral, Q24H  enoxaparin, 40 mg, Subcutaneous, Q24H  Lidocaine, 2 patch, Transdermal, Q24H  methotrexate, 2.5 mg, Oral, Once per day on Wed Thu  pantoprazole, 40 mg, Oral, Q AM  senna-docusate sodium, 2 tablet, Oral, BID  sodium chloride, 10 mL, Intravenous, Q12H  tamsulosin, 0.4 mg, Oral, Nightly    Continuous Infusions   PRN Meds    acetaminophen **OR** acetaminophen **OR** acetaminophen    senna-docusate sodium **AND** polyethylene glycol **AND** bisacodyl **AND** bisacodyl    calcium carbonate    Calcium Replacement - Follow Nurse / BPA Driven Protocol    Magnesium Standard Dose Replacement - Follow Nurse / BPA Driven Protocol    melatonin    muscle rub    ondansetron ODT **OR** ondansetron    Phosphorus Replacement - Follow Nurse / BPA Driven Protocol    Potassium Replacement - Follow Nurse / BPA Driven Protocol    [COMPLETED] Insert Peripheral IV **AND** sodium chloride    sodium chloride    sodium chloride     Diet  Diet: Cardiac Diets; Healthy Heart (2-3 Na+); Texture: Regular Texture (IDDSI 7); Fluid Consistency: Thin (IDDSI 0)    I have personally reviewed:  [x]  Medications  [x]  Laboratory   []  Microbiology   []  Radiology   []  " EKG/Telemetry   []  Cardiology/Vascular   []  Pathology   []  Records      Assessment/Plan     Active Hospital Problems    Diagnosis  POA    **Generalized weakness [R53.1]  Yes    Severe malnutrition [E43]  Yes    Immobility [Z74.09]  Yes    Fall from bed [W06.XXXA]  Yes    Tetrahydrocannabinol (THC) use disorder, mild, abuse [F12.10]  Yes    Generalized pain [R52]  Yes     [Z63.4]  Yes    Grief [F43.21]  Yes    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 male admitted with generalized weakness    Generalized weakness  Immobility  Fall from bed  Multifactorial secondary to age and chronic comorbidities  PT/OT following     Generalized pain  Disseminated idiopathic skeletal hyperostosis  Ankylosing spondylitis  X-ray of lumbar spine with diffuse ankylosis without any obvious injury or fracture.  methotrexate     Urinary retention  Constipation  catheter is out. Flomax added.  Urology evaluated     Elevated CK, improved  Lactic acidosis, resolved  related to dehydration     Hypertension  Atenolol; holding HCTZ as BP has been fine    DVT prophylaxis  Lovenox 40 mg SC daily    Discharge  Awaiting SNF  Expected Discharge Date: 12/29/2023; Expected Discharge Time:  6:00 PM    Discussed with patient and nursing staff    Ancelmo Shirley MD  [facility]  12/28/23

## 2023-12-28 NOTE — PLAN OF CARE
Goal Outcome Evaluation:  Plan of Care Reviewed With: patient        Progress: improving  Outcome Evaluation: Pt was seen for OT this am for tx session. He was sitting UIC at session start and end. Pt denies the need for self care, stating he has already completed w/ Nsg staff early am. Focus of session AROM exrcises to improve UE ROM for use during ADLs and fxnl xfers. Pt sat upright UIC during BUE AROM exercises for shoulder and scapular 2x 10 reps. Pt noted to utilize supported rest breaks d/t trunk fatigue.DC rec to rehab. OT jay jay continue to monitor.      Anticipated Discharge Disposition (OT): skilled nursing facility

## 2023-12-28 NOTE — CASE MANAGEMENT/SOCIAL WORK
Continued Stay Note  [facility]     Patient Name: Anthony Gallegos  MRN: [id number]  Today's Date: 12/28/2023    Admit Date: 12/20/2023    Plan: SNF pending bed availability.   Discharge Plan       Row Name 12/28/23 1626       Plan    Plan SNF pending bed availability.    Patient/Family in Agreement with Plan yes    Plan Comments Spoke with daughter/ Whit via outbound call. Introduced self. Daughter states that patient may have Medicare a&b. CCP given cards by patient at bedside and uploaded to 's office. Patient SNF pending bed availability. Will need transport.                   Discharge Codes    No documentation.                 Expected Discharge Date and Time       Expected Discharge Date Expected Discharge Time    Dec 29, 2023               Mariposa Orozco RN

## 2023-12-28 NOTE — THERAPY TREATMENT NOTE
Patient Name: Anthony Gallegos  : 1945    MRN: [id number]                              Today's Date: 2023       Admit Date: 2023    Visit Dx:     ICD-10-CM ICD-9-CM   1. Muscle pain  M79.10 729.1   2. Dizziness  R42 780.4   3. Immobility  Z74.09 799.89   4. Fall, initial encounter  W19.XXXA E888.9   5. Elevated CK  R74.8 790.5   6. Arthralgia, unspecified joint  M25.50 719.40   7. Ankylosing spondylitis of cervical region  M45.2 720.0     Patient Active Problem List   Diagnosis    Ankylosing spondylitis of cervical region    Recent unexplained weight loss    Abnormal serum lipase level    Abnormal serum level of amylase    Hypertension    Boil    Immobility    Fall from bed    Tetrahydrocannabinol (THC) use disorder, mild, abuse    Generalized pain        Grief    DISH (disseminated idiopathic skeletal hyperostosis)    Generalized weakness    Severe malnutrition     Past Medical History:   Diagnosis Date    Abscess of scrotum     Arthritis     AS (ankylosing spondylitis)     Hypertension     Tetrahydrocannabinol (THC) use disorder, mild, abuse 2023    Uveitis      Past Surgical History:   Procedure Laterality Date    COLONOSCOPY      ROTATOR CUFF REPAIR Right     TOTAL HIP ARTHROPLASTY Left       General Information       Row Name 23 1547          OT Time and Intention    Document Type therapy note (daily note)  -PP     Mode of Treatment individual therapy;occupational therapy  -PP       Row Name 23 1541          General Information    Patient Profile Reviewed yes  -PP     Existing Precautions/Restrictions fall  -PP     Barriers to Rehab none identified  -PP       Row Name 23 1546          Cognition    Orientation Status (Cognition) oriented x 4  -PP       Row Name 23 1540          Safety Issues, Functional Mobility    Impairments Affecting Function (Mobility) balance;coordination;endurance/activity tolerance;range of motion (ROM);strength  -PP     Comment,  Safety Issues/Impairments (Mobility) gait belt and non skid socks worn for safety  -PP               User Key  (r) = Recorded By, (t) = Taken By, (c) = Cosigned By      Initials Name Provider Type    Tracy Gillis OT Occupational Therapist                     Mobility/ADL's       Row Name 12/28/23 1548          Bed Mobility    Supine-Sit Bruce (Bed Mobility) not tested  -PP     Sit-Supine Bruce (Bed Mobility) not tested  -PP     Comment, (Bed Mobility) Pt UIC at session start and end  -PP       Row Name 12/28/23 1548          Bed-Chair Transfer    Bed-Chair Bruce (Transfers) not tested  -PP       Row Name 12/28/23 1548          Sit-Stand Transfer    Sit-Stand Bruce (Transfers) not tested  -PP       Row Name 12/28/23 1548          Functional Mobility    Functional Mobility- Ind. Level not tested  -PP       Row Name 12/28/23 1548          Activities of Daily Living    BADL Assessment/Intervention --  Pt declined the need for self care at this time stating he already completed earlier w/ Nsg.  -PP               User Key  (r) = Recorded By, (t) = Taken By, (c) = Cosigned By      Initials Name Provider Type    PP Tracy Vega OT Occupational Therapist                   Obj/Interventions       Row Name 12/28/23 1550          Shoulder (Therapeutic Exercise)    Shoulder AAROM (Therapeutic Exercise) bilateral;flexion;extension;aBduction;scapular elevation;scapular protraction;scapular retraction;10 repetitions;sitting  -PP       Row Name 12/28/23 1550          Elbow/Forearm (Therapeutic Exercise)    Elbow/Forearm AROM (Therapeutic Exercise) bilateral;10 repetitions;2 sets;extension;flexion  -PP       Row Name 12/28/23 1550          Wrist (Therapeutic Exercise)    Wrist (Therapeutic Exercise) AROM (active range of motion)  -PP     Wrist AROM (Therapeutic Exercise) bilateral;10 repetitions;2 sets  -PP       Row Name 12/28/23 1550          Motor Skills    Therapeutic Exercise wrist   -PP       Row Name 12/28/23 1559          Balance    Balance Assessment sitting static balance;sitting dynamic balance  -PP     Static Sitting Balance standby assist  -PP     Dynamic Sitting Balance standby assist  -PP     Position, Sitting Balance sitting in chair  sitting upright  -PP     Comment, Balance Pt sat upright UIC during BUE AROM exercises, w/ supported rest breaks d/t trunk fatigue.  -PP               User Key  (r) = Recorded By, (t) = Taken By, (c) = Cosigned By      Initials Name Provider Type    Tracy Gillis OT Occupational Therapist                   Goals/Plan    No documentation.                  Clinical Impression       Row Name 12/28/23 9270          Pain Assessment    Pretreatment Pain Rating 0/10 - no pain  -PP     Posttreatment Pain Rating 0/10 - no pain  -PP       Row Name 12/28/23 4011          Plan of Care Review    Plan of Care Reviewed With patient  -PP     Progress improving  -PP     Outcome Evaluation Pt was seen for OT this am for tx session. He was sitting UIC at session start and end. Pt denies the need for self care, stating he has already completed w/ Nsg staff early am. Focus of session AROM exrcises to improve UE ROM for use during ADLs and fxnl xfers. Pt sat upright UIC during BUE AROM exercises for shoulder and scapular 2x 10 reps. Pt noted to utilize supported rest breaks d/t trunk fatigue.DC rec to rehab. OT jay jay continue to monitor.  -PP       Row Name 12/28/23 1941          Therapy Plan Review/Discharge Plan (OT)    Anticipated Discharge Disposition (OT) skilled nursing facility  -PP       Row Name 12/28/23 2413          Vital Signs    O2 Delivery Pre Treatment room air  -PP     O2 Delivery Intra Treatment room air  -PP     O2 Delivery Post Treatment room air  -PP     Pre Patient Position Sitting  -PP     Intra Patient Position Sitting  -PP     Post Patient Position Sitting  -PP       Row Name 12/28/23 8167          Positioning and Restraints    Pre-Treatment  Position sitting in chair/recliner  -PP     Post Treatment Position chair  -PP     In Chair reclined;call light within reach;exit alarm on;encouraged to call for assist  -PP               User Key  (r) = Recorded By, (t) = Taken By, (c) = Cosigned By      Initials Name Provider Type    PP Tracy Vega OT Occupational Therapist                   Outcome Measures       Row Name 12/28/23 1558          How much help from another is currently needed...    Putting on and taking off regular lower body clothing? 2  -PP     Bathing (including washing, rinsing, and drying) 2  -PP     Toileting (which includes using toilet bed pan or urinal) 2  -PP     Putting on and taking off regular upper body clothing 2  -PP     Taking care of personal grooming (such as brushing teeth) 3  -PP     Eating meals 3  -PP     AM-PAC 6 Clicks Score (OT) 14  -PP       Row Name 12/28/23 4135          Modified [place] Scale    Modified [place] Scale 4 - Moderately severe disability.  Unable to walk without assistance, and unable to attend to own bodily needs without assistance.  -PP       Row Name 12/28/23 1558          Functional Assessment    Outcome Measure Options AM-PAC 6 Clicks Daily Activity (OT)  -PP               User Key  (r) = Recorded By, (t) = Taken By, (c) = Cosigned By      Initials Name Provider Type    Tracy Gillis OT Occupational Therapist                    Occupational Therapy Education       Title: PT OT SLP Therapies (In Progress)       Topic: Occupational Therapy (Not Started)       Point: ADL training (Not Started)       Description:   Instruct learner(s) on proper safety adaptation and remediation techniques during self care or transfers.   Instruct in proper use of assistive devices.                  Learner Progress:  Not documented in this visit.              Point: Home exercise program (Not Started)       Description:   Instruct learner(s) on appropriate technique for monitoring, assisting and/or  progressing therapeutic exercises/activities.                  Learner Progress:  Not documented in this visit.              Point: Precautions (Not Started)       Description:   Instruct learner(s) on prescribed precautions during self-care and functional transfers.                  Learner Progress:  Not documented in this visit.              Point: Body mechanics (Not Started)       Description:   Instruct learner(s) on proper positioning and spine alignment during self-care, functional mobility activities and/or exercises.                  Learner Progress:  Not documented in this visit.                                  OT Recommendation and Plan     Plan of Care Review  Plan of Care Reviewed With: patient  Progress: improving  Outcome Evaluation: Pt was seen for OT this am for tx session. He was sitting UIC at session start and end. Pt denies the need for self care, stating he has already completed w/ Nsg staff early am. Focus of session AROM exrcises to improve UE ROM for use during ADLs and fxnl xfers. Pt sat upright UIC during BUE AROM exercises for shoulder and scapular 2x 10 reps. Pt noted to utilize supported rest breaks d/t trunk fatigue.DC rec to rehab. OT jay jay continue to monitor.     Time Calculation:         Time Calculation- OT       Row Name 12/28/23 1600             Time Calculation- OT    OT Start Time 1133  -PP      OT Stop Time 1145  -PP      OT Time Calculation (min) 12 min  -PP      Total Timed Code Minutes- OT 12 minute(s)  -PP      OT Received On 12/28/23  -PP      OT - Next Appointment 12/29/23  -PP         Timed Charges    69579 - OT Therapeutic Exercise Minutes 12  -PP         Total Minutes    Timed Charges Total Minutes 12  -PP       Total Minutes 12  -PP                User Key  (r) = Recorded By, (t) = Taken By, (c) = Cosigned By      Initials Name Provider Type    PP Tracy Vega, OT Occupational Therapist                  Therapy Charges for Today       Code Description Service  Date Service Provider Modifiers Qty    [id number]  OT THER PROC EA 15 MIN 12/28/2023 Tracy Vega OT GO 1                 Tracy Vega OT  12/28/2023

## 2023-12-29 PROCEDURE — 25010000002 ENOXAPARIN PER 10 MG: Performed by: HOSPITALIST

## 2023-12-29 PROCEDURE — 97110 THERAPEUTIC EXERCISES: CPT

## 2023-12-29 RX ORDER — ATENOLOL 25 MG/1
25 TABLET ORAL
Status: DISCONTINUED | OUTPATIENT
Start: 2023-12-30 | End: 2024-01-06

## 2023-12-29 RX ADMIN — SENNOSIDES AND DOCUSATE SODIUM 2 TABLET: 50; 8.6 TABLET ORAL at 08:29

## 2023-12-29 RX ADMIN — PANTOPRAZOLE SODIUM 40 MG: 40 TABLET, DELAYED RELEASE ORAL at 06:22

## 2023-12-29 RX ADMIN — TAMSULOSIN HYDROCHLORIDE 0.4 MG: 0.4 CAPSULE ORAL at 22:32

## 2023-12-29 RX ADMIN — ACETAMINOPHEN 1000 MG: 500 TABLET ORAL at 22:32

## 2023-12-29 RX ADMIN — ATENOLOL 50 MG: 50 TABLET ORAL at 08:29

## 2023-12-29 RX ADMIN — LIDOCAINE 2 PATCH: 4 PATCH TOPICAL at 08:29

## 2023-12-29 RX ADMIN — ACETAMINOPHEN 1000 MG: 500 TABLET ORAL at 14:00

## 2023-12-29 RX ADMIN — ENOXAPARIN SODIUM 40 MG: 100 INJECTION SUBCUTANEOUS at 13:38

## 2023-12-29 RX ADMIN — ACETAMINOPHEN 1000 MG: 500 TABLET ORAL at 08:28

## 2023-12-29 RX ADMIN — SENNOSIDES AND DOCUSATE SODIUM 2 TABLET: 50; 8.6 TABLET ORAL at 22:32

## 2023-12-29 NOTE — CASE MANAGEMENT/SOCIAL WORK
Continued Stay Note  [facility]     Patient Name: Anthony Gallegos  MRN: [id number]  Today's Date: 12/29/2023    Admit Date: 12/20/2023    Plan: SNF referral pending.   Discharge Plan       Row Name 12/29/23 1607       Plan    Plan SNF referral pending.    Patient/Family in Agreement with Plan yes    Plan Comments Spoke with patient's daughter/ Whit via outbound call. Introduced self. Explained that current choices for SNF do not have beds available. Family agreeable to area referrals. New referrals placed.                   Discharge Codes    No documentation.                 Expected Discharge Date and Time       Expected Discharge Date Expected Discharge Time    Dec 29, 2023               Mariposa Orozco RN

## 2023-12-29 NOTE — PROGRESS NOTES
"    Name: Anthony Gallegos ADMIT: 2023   : 1945  PCP: Marco Macedo MD    MRN: [id number] LOS: 7 days   AGE/SEX: 78 y.o. male  ROOM: [facility]     Subjective   Subjective   No new complaints. \"fine!\"     Objective   Objective   Vital Signs  Temp:  [97.2 °F (36.2 °C)-98.6 °F (37 °C)] 97.2 °F (36.2 °C)  Heart Rate:  [55-89] 89  Resp:  [16-18] 16  BP: ()/(49-62) 97/62  SpO2:  [98 %-100 %] 100 %  on   ;   Device (Oxygen Therapy): room air  Body mass index is 23.62 kg/m².    Physical Exam  Constitutional:       General: He is not in acute distress.     Appearance: Normal appearance. He is not toxic-appearing.   HENT:      Head: Normocephalic and atraumatic.   Cardiovascular:      Rate and Rhythm: Normal rate and regular rhythm.   Pulmonary:      Effort: Pulmonary effort is normal. No respiratory distress.      Breath sounds: Normal breath sounds.   Abdominal:      General: Bowel sounds are normal.      Palpations: Abdomen is soft.      Tenderness: There is no abdominal tenderness.   Musculoskeletal:         General: No swelling.   Skin:     General: Skin is warm and dry.   Neurological:      General: No focal deficit present.      Mental Status: He is alert.   Psychiatric:         Mood and Affect: Mood normal.         Behavior: Behavior normal.     Results Review  I reviewed the patient's new clinical results.  Results from last 7 days   Lab Units 23  0750   WBC 10*3/mm3 6.23   HEMOGLOBIN g/dL 12.2*   PLATELETS 10*3/mm3 150     Results from last 7 days   Lab Units 23  0602 23  0608 23  0638 23  0750   SODIUM mmol/L 137 138 137 135*   POTASSIUM mmol/L 4.1 3.8 3.9 4.0   CHLORIDE mmol/L 103 103 104 102   CO2 mmol/L 28.0 27.0 26.4 22.0   BUN mg/dL 18 17 16 16   CREATININE mg/dL 0.70* 0.66* 0.64* 0.79   GLUCOSE mg/dL 89 89 81 90     Lab Results   Component Value Date    ANIONGAP 6.0 2023     Estimated Creatinine Clearance: 103.7 mL/min (A) (by C-G formula based on SCr of 0.7 " "mg/dL (L)).   Lab Results   Component Value Date    EGFR 93.7 12/27/2023     Results from last 7 days   Lab Units 12/25/23  0608 12/24/23  0638 12/23/23  0750   ALBUMIN g/dL 3.2* 3.0* 3.2*   BILIRUBIN mg/dL 0.4 0.3 0.3   ALK PHOS U/L 36* 36* 37*   AST (SGOT) U/L 33 34 42*   ALT (SGPT) U/L 24 16 18     Results from last 7 days   Lab Units 12/27/23  0602 12/25/23  0608 12/24/23  0638 12/23/23  0750   CALCIUM mg/dL 8.8 8.5* 8.6 8.6   ALBUMIN g/dL  --  3.2* 3.0* 3.2*       No results found for: \"HGBA1C\", \"POCGLU\"    No radiology results for the last day    Scheduled Meds  acetaminophen, 1,000 mg, Oral, TID  atenolol, 50 mg, Oral, Q24H  enoxaparin, 40 mg, Subcutaneous, Q24H  Lidocaine, 2 patch, Transdermal, Q24H  methotrexate, 2.5 mg, Oral, Once per day on Wed Thu  pantoprazole, 40 mg, Oral, Q AM  senna-docusate sodium, 2 tablet, Oral, BID  sodium chloride, 10 mL, Intravenous, Q12H  tamsulosin, 0.4 mg, Oral, Nightly    Continuous Infusions   PRN Meds    acetaminophen **OR** acetaminophen **OR** acetaminophen    senna-docusate sodium **AND** polyethylene glycol **AND** bisacodyl **AND** bisacodyl    calcium carbonate    Calcium Replacement - Follow Nurse / BPA Driven Protocol    Magnesium Standard Dose Replacement - Follow Nurse / BPA Driven Protocol    melatonin    muscle rub    ondansetron ODT **OR** ondansetron    Phosphorus Replacement - Follow Nurse / BPA Driven Protocol    Potassium Replacement - Follow Nurse / BPA Driven Protocol    [COMPLETED] Insert Peripheral IV **AND** sodium chloride    sodium chloride    sodium chloride     Diet  Diet: Cardiac Diets; Healthy Heart (2-3 Na+); Texture: Regular Texture (IDDSI 7); Fluid Consistency: Thin (IDDSI 0)    I have personally reviewed:  [x]  Medications  []  Laboratory   []  Microbiology   []  Radiology   []  EKG/Telemetry   []  Cardiology/Vascular   []  Pathology   []  Records      Assessment/Plan     Active Hospital Problems    Diagnosis  POA    **Generalized weakness " [R53.1]  Yes    Severe malnutrition [E43]  Yes    Immobility [Z74.09]  Yes    Fall from bed [W06.XXXA]  Yes    Tetrahydrocannabinol (THC) use disorder, mild, abuse [F12.10]  Yes    Generalized pain [R52]  Yes     [Z63.4]  Yes    Grief [F43.21]  Yes    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 male admitted with generalized weakness    Generalized weakness  Immobility  Fall from bed  Multifactorial secondary to age and chronic comorbidities  PT/OT following     Generalized pain  Disseminated idiopathic skeletal hyperostosis  Ankylosing spondylitis  X-ray of lumbar spine with diffuse ankylosis without any obvious injury or fracture.  methotrexate     Urinary retention  Constipation  catheter is out. Flomax added.  Urology evaluated     Elevated CK, improved  Lactic acidosis, resolved  related to dehydration     Hypertension  Some low normal BPs. Reduce atenolol    DVT prophylaxis  Lovenox 40 mg SC daily    Discharge  Awaiting SNF  Expected Discharge Date: 12/29/2023; Expected Discharge Time:  6:00 PM    Discussed with patient and nursing staff    Ancelmo Shirley MD  [facility]  12/29/23

## 2023-12-29 NOTE — THERAPY TREATMENT NOTE
Patient Name: Anthony Gallegos  : 1945    MRN: [id number]                              Today's Date: 2023       Admit Date: 2023    Visit Dx:     ICD-10-CM ICD-9-CM   1. Muscle pain  M79.10 729.1   2. Dizziness  R42 780.4   3. Immobility  Z74.09 799.89   4. Fall, initial encounter  W19.XXXA E888.9   5. Elevated CK  R74.8 790.5   6. Arthralgia, unspecified joint  M25.50 719.40   7. Ankylosing spondylitis of cervical region  M45.2 720.0     Patient Active Problem List   Diagnosis    Ankylosing spondylitis of cervical region    Recent unexplained weight loss    Abnormal serum lipase level    Abnormal serum level of amylase    Hypertension    Boil    Immobility    Fall from bed    Tetrahydrocannabinol (THC) use disorder, mild, abuse    Generalized pain        Grief    DISH (disseminated idiopathic skeletal hyperostosis)    Generalized weakness    Severe malnutrition     Past Medical History:   Diagnosis Date    Abscess of scrotum     Arthritis     AS (ankylosing spondylitis)     Hypertension     Tetrahydrocannabinol (THC) use disorder, mild, abuse 2023    Uveitis      Past Surgical History:   Procedure Laterality Date    COLONOSCOPY      ROTATOR CUFF REPAIR Right     TOTAL HIP ARTHROPLASTY Left       General Information       Row Name 23 1116          Physical Therapy Time and Intention    Document Type therapy note (daily note)  -     Mode of Treatment individual therapy;physical therapy  -       Row Name 23 1116          General Information    Patient Profile Reviewed yes  -       Row Name 23 1116          Living Environment    People in Home alone  -       Row Name 23 1116          Cognition    Orientation Status (Cognition) oriented x 4  sometimes slow to answer  -       Row Name 23 1116          Safety Issues, Functional Mobility    Safety Issues Affecting Function (Mobility) problem-solving;safety precaution awareness  -     Impairments  Affecting Function (Mobility) balance;coordination;endurance/activity tolerance;pain;strength  -               User Key  (r) = Recorded By, (t) = Taken By, (c) = Cosigned By      Initials Name Provider Type    Rosa Asencio PTA Physical Therapist Assistant                   Mobility       Row Name 12/29/23 1117          Bed Mobility    Supine-Sit [place] (Bed Mobility) moderate assist (50% patient effort)  post lean, required 2 attempts to complete-leaned back on bed  -     Sit-Supine [place] (Bed Mobility) not tested  -     Assistive Device (Bed Mobility) head of bed elevated;bed rails  -       Row Name 12/29/23 1117          Sit-Stand Transfer    Sit-Stand [place] (Transfers) 2 person assist;minimum assist (75% patient effort)  elevated bed height for success, 2 attempts  -     Assistive Device (Sit-Stand Transfers) walker, front-wheeled  -     Comment, (Sit-Stand Transfer) min 2 with bed elevated, NBOS, post lean, not up yet this am and RLE pain limiting  -       Row Name 12/29/23 1117          Gait/Stairs (Locomotion)    [place]ott Level (Gait) 2 person assist;contact guard;verbal cues  -     Assistive Device (Gait) walker, front-wheeled  -     Distance in Feet (Gait) 35  1 standing rest break  -     Deviations/Abnormal Patterns (Gait) ranjit decreased;base of support, narrow;stride length decreased;weight shifting decreased  -     Bilateral Gait Deviations forward flexed posture  -     Right Sided Gait Deviations decreased knee extension  -     Comment, (Gait/Stairs) c/o R knee hurting today  -               User Key  (r) = Recorded By, (t) = Taken By, (c) = Cosigned By      Initials Name Provider Type    Rosa Asencio PTA Physical Therapist Assistant                   Obj/Interventions       Row Name 12/29/23 1121          Range of Motion Comprehensive    Comment, General Range of Motion limited R knee ext , incr pain today  -       Row Name  12/29/23 1121          Balance    Comment, Balance lengthy assist in BR, had to call nsg to finish and complete bath while in BR, pt had difficulty standing long enough for complete clean -up by PT, R LE weak and tired  -               User Key  (r) = Recorded By, (t) = Taken By, (c) = Cosigned By      Initials Name Provider Type    Rosa Asencio PTA Physical Therapist Assistant                   Goals/Plan    No documentation.                  Clinical Impression       Row Name 12/29/23 1123          Pain    Pre/Posttreatment Pain Comment did not rate #, but mod pain initially , then min by end of amb  -     Pain Intervention(s) Repositioned  -       Row Name 12/29/23 1123          Plan of Care Review    Plan of Care Reviewed With patient  -     Outcome Evaluation Pt agreed to PT session, pt states he will do what he can, his R leg is hurting today, slightly warmer R knee than L, pt req MOD assist supine to sit, 2 attempts to complete to EOB, pt alexei STS w/assist of 2 and rwx, pt alexei amb ~35ft , CGA2 one standing rest, some diff wt shifting so req more assist for safety, [place] SN[place] awa[place] INS approval, below baseline and would benefit from SNU to ensure incr strengthening and incr safety once home alone  -       Row Name 12/29/23 1123          Therapy Assessment/Plan (PT)    Rehab Potential (PT) good, to achieve stated therapy goals  -     Criteria for Skilled Interventions Met (PT) yes  -       Row Name 12/29/23 1123          Vital Signs    O2 Delivery Pre Treatment room air  -       Row Name 12/29/23 1123          Positioning and Restraints    Pre-Treatment Position in bed  -     Post Treatment Position bathroom  -     Bathroom sitting;with nsg  -               User Key  (r) = Recorded By, (t) = Taken By, (c) = Cosigned By      Initials Name Provider Type    Rosa Asencio PTA Physical Therapist Assistant                   Outcome Measures       Row Name 12/29/23 1135  12/29/23 0103       How much help from another person do you currently need...    Turning from your back to your side while in flat bed without using bedrails? 3  -JM 3  -DB    Moving from lying on back to sitting on the side of a flat bed without bedrails? 2  -JM 3  -DB    Moving to and from a bed to a chair (including a wheelchair)? 3  -JM 2  -DB    Standing up from a chair using your arms (e.g., wheelchair, bedside chair)? 2  -JM 2  -DB    Climbing 3-5 steps with a railing? 1  -JM 1  -DB    To walk in hospital room? 3  -AMELIA 1  -DB    AM-PAC 6 Clicks Score (PT) 14  - 12  -DB    Highest Level of Mobility Goal 4 --> Transfer to chair/commode  -JM 4 --> Transfer to chair/commode  -DB              User Key  (r) = Recorded By, (t) = Taken By, (c) = Cosigned By      Initials Name Provider Type    Rosa Asencio PTA Physical Therapist Assistant    Elizabeth Johnson RN Registered Nurse                                 Physical Therapy Education       Title: PT OT SLP Therapies (In Progress)       Topic: Physical Therapy (Done)       Point: Mobility training (Done)       Learning Progress Summary             Patient Acceptance, E,TB,D, VU,NR by AMELIA at 12/29/2023 1130    Acceptance, E, VU by SHLOMO at 12/27/2023 1554    Acceptance, E,TB, VU by KRISTEN at 12/26/2023 1029    Acceptance, E,TB, VU by  at 12/23/2023 1618    Eager, E,TB,D, VU,NR by AMELIA at 12/22/2023 1700    Acceptance, E, VU by CLARISSA at 12/21/2023 1713                         Point: Home exercise program (Done)       Learning Progress Summary             Patient Acceptance, E,TB,D, VU,NR by AMELIA at 12/29/2023 1130    Acceptance, E,TB, VU by KRISTEN at 12/26/2023 1029    Acceptance, E,TB, VU by  at 12/23/2023 1618    Eager, E,TB,D, VU,NR by AMELIA at 12/22/2023 1700    Acceptance, E, VU by CLARISSA at 12/21/2023 1713                         Point: Body mechanics (Done)       Learning Progress Summary             Patient Acceptance, E,TB,D, VU,NR by AMELIA at 12/29/2023 2681     Acceptance, E, VU by SHLOMO at 12/27/2023 1554    Acceptance, E,TB, VU by KRISTEN at 12/26/2023 1029    Acceptance, E,TB, VU by  at 12/23/2023 1618    Eager, E,TB,D, VU,NR by AMELIA at 12/22/2023 1700    Acceptance, E, VU by CLARISSA at 12/21/2023 1713                         Point: Precautions (Done)       Learning Progress Summary             Patient Acceptance, E,TB,D, VU,NR by AMELIA at 12/29/2023 1130    Acceptance, E, VU by SHLOMO at 12/27/2023 1554    Acceptance, E,TB, VU by KRISTEN at 12/26/2023 1029    Acceptance, E,TB, VU by  at 12/23/2023 1618    Eager, E,TB,D, VU,NR by AMELIA at 12/22/2023 1700    Acceptance, E, VU by CLARISSA at 12/21/2023 1713                                         User Key       Initials Effective Dates Name Provider Type Discipline    KRISTEN 06/16/21 -  Mariposa Kennedy, PT Physical Therapist PT    AMELIA 03/07/18 -  Rosa Portillo, PTA Physical Therapist Assistant PT    CLARISSA 06/16/21 -  Felipa Weathers, PT Physical Therapist PT    SHLOMO 05/08/23 -  Ashley Bertrand, PT Physical Therapist PT     05/24/23 -  Leobardo Weller, PT Physical Therapist PT                  PT Recommendation and Plan     Plan of Care Reviewed With: patient  Progress: improving  Outcome Evaluation: Pt agreed to PT session, pt states he will do what he can, his R leg is hurting today, slightly warmer R knee than L, pt req MOD assist supine to sit, 2 attempts to complete to EOB, pt alexei STS w/assist of 2 and rwx, pt alexei amb ~35ft , CGA2 one standing rest, some diff wt shifting so req more assist for safety, pt plans SNU at [place] awaits INS approval, below baseline and would benefit from SNU to ensure incr strengthening and incr safety once home alone     Time Calculation:         PT Charges       Row Name 12/29/23 1130             Time Calculation    Start Time 0915  -      Stop Time 0944  -      Time Calculation (min) 29 min  -AMELIA      PT Received On 12/29/23  -AMELIA      PT - Next Appointment 12/30/23  -AMELIA                User Key  (r) = Recorded By, (t) =  Taken By, (c) = Cosigned By      Initials Name Provider Type    Rosa Asencio PTA Physical Therapist Assistant                  Therapy Charges for Today       Code Description Service Date Service Provider Modifiers Qty    04795143182 HC PT THER PROC EA 15 MIN 12/29/2023 Rosa Portillo PTA GP 2    89479161627 HC PT THER SUPP EA 15 MIN 12/29/2023 Rosa Portillo PTA GP 2            PT G-Codes  Outcome Measure Options: AM-PAC 6 Clicks Daily Activity (OT)  AM-PAC 6 Clicks Score (PT): 14  AM-PAC 6 Clicks Score (OT): 14  Modified Grayson Scale: 4 - Moderately severe disability.  Unable to walk without assistance, and unable to attend to own bodily needs without assistance.  PT Discharge Summary  Anticipated Discharge Disposition (PT): skilled nursing facility    Rosa Portillo PTA  12/29/2023

## 2023-12-29 NOTE — PLAN OF CARE
Goal Outcome Evaluation:  Plan of Care Reviewed With: patient        Progress: improving  Outcome Evaluation: Pt agreed to PT session, pt states he will do what he can, his R leg is hurting today, slightly warmer R knee than L, pt req MOD assist supine to sit, 2 attempts to complete to EOB, pt alexei STS w/assist of 2 and rwx, pt alexei amb ~35ft , CGA2 one standing rest, some diff wt shifting so req more assist for safety, pt plans SNU at [place] awaits INS approval, below baseline and would benefit from SNU to ensure incr strengthening and incr safety once home alone      Anticipated Discharge Disposition (PT): skilled nursing facility

## 2023-12-30 LAB — GLUCOSE BLDC GLUCOMTR-MCNC: 117 MG/DL (ref 70–130)

## 2023-12-30 PROCEDURE — 25010000002 ENOXAPARIN PER 10 MG: Performed by: HOSPITALIST

## 2023-12-30 PROCEDURE — 82948 REAGENT STRIP/BLOOD GLUCOSE: CPT

## 2023-12-30 RX ADMIN — PANTOPRAZOLE SODIUM 40 MG: 40 TABLET, DELAYED RELEASE ORAL at 06:30

## 2023-12-30 RX ADMIN — LIDOCAINE 2 PATCH: 4 PATCH TOPICAL at 10:10

## 2023-12-30 RX ADMIN — ENOXAPARIN SODIUM 40 MG: 100 INJECTION SUBCUTANEOUS at 12:53

## 2023-12-30 RX ADMIN — ATENOLOL 25 MG: 25 TABLET ORAL at 10:11

## 2023-12-30 RX ADMIN — ACETAMINOPHEN 1000 MG: 500 TABLET ORAL at 21:07

## 2023-12-30 RX ADMIN — ACETAMINOPHEN 1000 MG: 500 TABLET ORAL at 10:10

## 2023-12-30 RX ADMIN — TAMSULOSIN HYDROCHLORIDE 0.4 MG: 0.4 CAPSULE ORAL at 23:09

## 2023-12-30 NOTE — PROGRESS NOTES
"    Name: Anthony Gallegos ADMIT: 2023   : 1945  PCP: Marco Macedo MD    MRN: [id number] LOS: 8 days   AGE/SEX: 78 y.o. male  ROOM: [facility]     Subjective   Subjective   No new complaints. \"fine!\" Lying comfortably in bed     Objective   Objective   Vital Signs  Temp:  [97.2 °F (36.2 °C)-98.4 °F (36.9 °C)] 98.1 °F (36.7 °C)  Heart Rate:  [54-89] 56  Resp:  [16] 16  BP: ()/(48-64) 114/60  SpO2:  [96 %-100 %] 98 %  on   ;   Device (Oxygen Therapy): room air  Body mass index is 23.5 kg/m².    Physical Exam  Constitutional:       General: He is not in acute distress.     Appearance: Normal appearance. He is not toxic-appearing.   HENT:      Head: Normocephalic and atraumatic.   Cardiovascular:      Rate and Rhythm: Normal rate and regular rhythm.   Pulmonary:      Effort: Pulmonary effort is normal. No respiratory distress.      Breath sounds: Normal breath sounds.   Abdominal:      General: Bowel sounds are normal.      Palpations: Abdomen is soft.      Tenderness: There is no abdominal tenderness.   Musculoskeletal:         General: No swelling.   Skin:     General: Skin is warm and dry.   Neurological:      General: No focal deficit present.      Mental Status: He is alert.   Psychiatric:         Mood and Affect: Mood normal.         Behavior: Behavior normal.     Results Review  I reviewed the patient's new clinical results.        Results from last 7 days   Lab Units 23  0602 23  0608 23  0638   SODIUM mmol/L 137 138 137   POTASSIUM mmol/L 4.1 3.8 3.9   CHLORIDE mmol/L 103 103 104   CO2 mmol/L 28.0 27.0 26.4   BUN mg/dL 18 17 16   CREATININE mg/dL 0.70* 0.66* 0.64*   GLUCOSE mg/dL 89 89 81     Lab Results   Component Value Date    ANIONGAP 6.0 2023     Estimated Creatinine Clearance: 103.2 mL/min (A) (by C-G formula based on SCr of 0.7 mg/dL (L)).   Lab Results   Component Value Date    EGFR 93.7 2023     Results from last 7 days   Lab Units 23  0608 " "12/24/23  0638   ALBUMIN g/dL 3.2* 3.0*   BILIRUBIN mg/dL 0.4 0.3   ALK PHOS U/L 36* 36*   AST (SGOT) U/L 33 34   ALT (SGPT) U/L 24 16     Results from last 7 days   Lab Units 12/27/23  0602 12/25/23  0608 12/24/23  0638   CALCIUM mg/dL 8.8 8.5* 8.6   ALBUMIN g/dL  --  3.2* 3.0*       No results found for: \"HGBA1C\", \"POCGLU\"    No radiology results for the last day    Scheduled Meds  acetaminophen, 1,000 mg, Oral, TID  atenolol, 25 mg, Oral, Q24H  enoxaparin, 40 mg, Subcutaneous, Q24H  Lidocaine, 2 patch, Transdermal, Q24H  methotrexate, 2.5 mg, Oral, Once per day on Wed Thu  pantoprazole, 40 mg, Oral, Q AM  senna-docusate sodium, 2 tablet, Oral, BID  sodium chloride, 10 mL, Intravenous, Q12H  tamsulosin, 0.4 mg, Oral, Nightly    Continuous Infusions   PRN Meds    acetaminophen **OR** acetaminophen **OR** acetaminophen    senna-docusate sodium **AND** polyethylene glycol **AND** bisacodyl **AND** bisacodyl    calcium carbonate    Calcium Replacement - Follow Nurse / BPA Driven Protocol    Magnesium Standard Dose Replacement - Follow Nurse / BPA Driven Protocol    melatonin    muscle rub    ondansetron ODT **OR** ondansetron    Phosphorus Replacement - Follow Nurse / BPA Driven Protocol    Potassium Replacement - Follow Nurse / BPA Driven Protocol    [COMPLETED] Insert Peripheral IV **AND** sodium chloride    sodium chloride    sodium chloride     Diet  Diet: Cardiac Diets; Healthy Heart (2-3 Na+); Texture: Regular Texture (IDDSI 7); Fluid Consistency: Thin (IDDSI 0)    I have personally reviewed:  [x]  Medications  []  Laboratory   []  Microbiology   []  Radiology   []  EKG/Telemetry   []  Cardiology/Vascular   []  Pathology   []  Records      Assessment/Plan     Active Hospital Problems    Diagnosis  POA    **Generalized weakness [R53.1]  Yes    Severe malnutrition [E43]  Yes    Immobility [Z74.09]  Yes    Fall from bed [W06.XXXA]  Yes    Tetrahydrocannabinol (THC) use disorder, mild, abuse [F12.10]  Yes    " Generalized pain [R52]  Yes     [Z63.4]  Yes    Grief [F43.21]  Yes    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 male admitted with generalized weakness    Generalized weakness  Immobility  Fall from bed  Multifactorial secondary to age and chronic comorbidities  PT/OT following     Generalized pain  Disseminated idiopathic skeletal hyperostosis  Ankylosing spondylitis  X-ray of lumbar spine with diffuse ankylosis without any obvious injury or fracture.  methotrexate     Urinary retention  Constipation  catheter is out. Flomax added.  Urology evaluated     Elevated CK, improved  Lactic acidosis, resolved  related to dehydration     Hypertension  Had some some low normal BPs and heart rate. Reduced atenolol and monitoring    DVT prophylaxis  Lovenox 40 mg SC daily    Discharge  Awaiting SNF  Expected discharge date/ time has not been documented.    Discussed with patient and nursing staff    Ancelmo Shirley MD  [facility]  12/30/23

## 2023-12-31 PROCEDURE — 97530 THERAPEUTIC ACTIVITIES: CPT

## 2023-12-31 PROCEDURE — 25010000002 ENOXAPARIN PER 10 MG: Performed by: HOSPITALIST

## 2023-12-31 RX ADMIN — ACETAMINOPHEN 1000 MG: 500 TABLET ORAL at 09:11

## 2023-12-31 RX ADMIN — PANTOPRAZOLE SODIUM 40 MG: 40 TABLET, DELAYED RELEASE ORAL at 05:59

## 2023-12-31 RX ADMIN — ACETAMINOPHEN 1000 MG: 500 TABLET ORAL at 21:46

## 2023-12-31 RX ADMIN — TAMSULOSIN HYDROCHLORIDE 0.4 MG: 0.4 CAPSULE ORAL at 21:46

## 2023-12-31 RX ADMIN — ENOXAPARIN SODIUM 40 MG: 100 INJECTION SUBCUTANEOUS at 12:30

## 2023-12-31 RX ADMIN — ACETAMINOPHEN 1000 MG: 500 TABLET ORAL at 15:42

## 2023-12-31 NOTE — PROGRESS NOTES
"    Name: Anthony Gallegos ADMIT: 2023   : 1945  PCP: Marco Macedo MD    MRN: [id number] LOS: 9 days   AGE/SEX: 78 y.o. male  ROOM: [facility]     Subjective   Subjective   No new complaints \"just fine!\". Resting in bed comfortably.     Objective   Objective   Vital Signs  Temp:  [97.5 °F (36.4 °C)-100.1 °F (37.8 °C)] 98.4 °F (36.9 °C)  Heart Rate:  [53-67] 67  Resp:  [16] 16  BP: ()/(48-56) 98/54  SpO2:  [97 %-99 %] 98 %  on   ;   Device (Oxygen Therapy): room air  Body mass index is 23.09 kg/m².    Physical Exam  Constitutional:       General: He is not in acute distress.     Appearance: Normal appearance. He is not toxic-appearing.   HENT:      Head: Normocephalic and atraumatic.   Cardiovascular:      Rate and Rhythm: Normal rate and regular rhythm.   Pulmonary:      Effort: Pulmonary effort is normal. No respiratory distress.      Breath sounds: Normal breath sounds.   Abdominal:      General: Bowel sounds are normal.      Palpations: Abdomen is soft.      Tenderness: There is no abdominal tenderness.   Musculoskeletal:         General: No swelling.   Skin:     General: Skin is warm and dry.   Neurological:      General: No focal deficit present.      Mental Status: He is alert.   Psychiatric:         Mood and Affect: Mood normal.         Behavior: Behavior normal.     Results Review  I reviewed the patient's new clinical results.        Results from last 7 days   Lab Units 23  0602 23  0608   SODIUM mmol/L 137 138   POTASSIUM mmol/L 4.1 3.8   CHLORIDE mmol/L 103 103   CO2 mmol/L 28.0 27.0   BUN mg/dL 18 17   CREATININE mg/dL 0.70* 0.66*   GLUCOSE mg/dL 89 89     Lab Results   Component Value Date    ANIONGAP 6.0 2023     Estimated Creatinine Clearance: 101.4 mL/min (A) (by C-G formula based on SCr of 0.7 mg/dL (L)).   Lab Results   Component Value Date    EGFR 93.7 2023     Results from last 7 days   Lab Units 23  0608   ALBUMIN g/dL 3.2*   BILIRUBIN mg/dL 0.4   ALK " PHOS U/L 36*   AST (SGOT) U/L 33   ALT (SGPT) U/L 24     Results from last 7 days   Lab Units 12/27/23  0602 12/25/23  0608   CALCIUM mg/dL 8.8 8.5*   ALBUMIN g/dL  --  3.2*       Glucose   Date/Time Value Ref Range Status   12/30/2023 2027 117 70 - 130 mg/dL Final       No radiology results for the last day    Scheduled Meds  acetaminophen, 1,000 mg, Oral, TID  atenolol, 25 mg, Oral, Q24H  enoxaparin, 40 mg, Subcutaneous, Q24H  Lidocaine, 2 patch, Transdermal, Q24H  methotrexate, 2.5 mg, Oral, Once per day on Wed Thu  pantoprazole, 40 mg, Oral, Q AM  senna-docusate sodium, 2 tablet, Oral, BID  sodium chloride, 10 mL, Intravenous, Q12H  tamsulosin, 0.4 mg, Oral, Nightly    Continuous Infusions   PRN Meds    acetaminophen **OR** acetaminophen **OR** acetaminophen    senna-docusate sodium **AND** polyethylene glycol **AND** bisacodyl **AND** bisacodyl    calcium carbonate    Calcium Replacement - Follow Nurse / BPA Driven Protocol    Magnesium Standard Dose Replacement - Follow Nurse / BPA Driven Protocol    melatonin    muscle rub    ondansetron ODT **OR** ondansetron    Phosphorus Replacement - Follow Nurse / BPA Driven Protocol    Potassium Replacement - Follow Nurse / BPA Driven Protocol    [COMPLETED] Insert Peripheral IV **AND** sodium chloride    sodium chloride    sodium chloride     Diet  Diet: Cardiac Diets; Healthy Heart (2-3 Na+); Texture: Regular Texture (IDDSI 7); Fluid Consistency: Thin (IDDSI 0)    I have personally reviewed:  [x]  Medications  []  Laboratory   []  Microbiology   []  Radiology   []  EKG/Telemetry   []  Cardiology/Vascular   []  Pathology   []  Records      Assessment/Plan     Active Hospital Problems    Diagnosis  POA    **Generalized weakness [R53.1]  Yes    Severe malnutrition [E43]  Yes    Immobility [Z74.09]  Yes    Fall from bed [W06.XXXA]  Yes    Tetrahydrocannabinol (THC) use disorder, mild, abuse [F12.10]  Yes    Generalized pain [R52]  Yes     [Z63.4]  Yes    Grief  [F43.21]  Yes    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 male admitted with generalized weakness    Generalized weakness  Immobility  Fall from bed  Multifactorial secondary to age and chronic comorbidities  PT/OT following     Generalized pain  Disseminated idiopathic skeletal hyperostosis  Ankylosing spondylitis  X-ray of lumbar spine with diffuse ankylosis without any obvious injury or fracture.  methotrexate     Urinary retention  Constipation  catheter is out. Flomax added.  Urology evaluated     Elevated CK, improved  Lactic acidosis, resolved  related to dehydration     Hypertension  Still with some low normal BPs and heart rate. Reduced atenolol (lower dose started yesterday morning, held this morning per parameters will put in hold order)    DVT prophylaxis  Lovenox 40 mg SC daily    Discharge  Awaiting SNF  Expected discharge date/ time has not been documented.    Discussed with patient and nursing staff    Ancelmo Shirley MD  [facility]  12/31/23

## 2023-12-31 NOTE — PLAN OF CARE
Goal Outcome Evaluation:  Plan of Care Reviewed With: patient        Progress: no change  Outcome Evaluation: AOx4. Worked with PT this shift. Up in chair throughout afternoon. No complaints of pain. Lidocaine patch to lower back. Awaiting placement. VSS.

## 2023-12-31 NOTE — PLAN OF CARE
Goal Outcome Evaluation:  Plan of Care Reviewed With: patient        Progress: improving  Outcome Evaluation: Pt requires Chuckie for sit to stand and able to increase ambulation distance to 60' this date, however with forward flexed posture, slow pattern and easily distracted with mobility. Pt would benefit from D/C to snf per safety concerns and to increase activity endurance and strength.      Anticipated Discharge Disposition (PT): skilled nursing facility

## 2023-12-31 NOTE — THERAPY TREATMENT NOTE
Patient Name: Anthony Gallegos  : 1945    MRN: [id number]                              Today's Date: 2023       Admit Date: 2023    Visit Dx:     ICD-10-CM ICD-9-CM   1. Muscle pain  M79.10 729.1   2. Dizziness  R42 780.4   3. Immobility  Z74.09 799.89   4. Fall, initial encounter  W19.XXXA E888.9   5. Elevated CK  R74.8 790.5   6. Arthralgia, unspecified joint  M25.50 719.40   7. Ankylosing spondylitis of cervical region  M45.2 720.0     Patient Active Problem List   Diagnosis    Ankylosing spondylitis of cervical region    Recent unexplained weight loss    Abnormal serum lipase level    Abnormal serum level of amylase    Hypertension    Boil    Immobility    Fall from bed    Tetrahydrocannabinol (THC) use disorder, mild, abuse    Generalized pain        Grief    DISH (disseminated idiopathic skeletal hyperostosis)    Generalized weakness    Severe malnutrition     Past Medical History:   Diagnosis Date    Abscess of scrotum     Arthritis     AS (ankylosing spondylitis)     Hypertension     Tetrahydrocannabinol (THC) use disorder, mild, abuse 2023    Uveitis      Past Surgical History:   Procedure Laterality Date    COLONOSCOPY      ROTATOR CUFF REPAIR Right     TOTAL HIP ARTHROPLASTY Left       General Information       Row Name 23 [employer]          Physical Therapy Time and Intention    Document Type therapy note (daily note)  -CN     Mode of Treatment individual therapy;physical therapy  -CN       Row Name 23 [employer]          General Information    Patient Profile Reviewed yes  -CN     Existing Precautions/Restrictions fall  -CN       Row Name 23 [employer]          Cognition    Orientation Status (Cognition) oriented x 4  -CN       Row Name 23 [employer]          Safety Issues, Functional Mobility    Impairments Affecting Function (Mobility) balance;coordination;endurance/activity tolerance;pain;strength  -CN               User Key  (r) = Recorded By, (t) = Taken By, (c)  = Cosigned By      Initials Name Provider Type    Kirsty Hawkins, PT Physical Therapist                   Mobility       Row Name 12/31/23 1433          Bed Mobility    Comment, (Bed Mobility) UIC  -CN       Row Name 12/31/23 1433          Sit-Stand Transfer    Sit-Stand Ellis (Transfers) minimum assist (75% patient effort)  -CN     Assistive Device (Sit-Stand Transfers) walker, front-wheeled  -CN       Row Name 12/31/23 1433          Gait/Stairs (Locomotion)    Ellis Level (Gait) contact guard  -CN     Assistive Device (Gait) walker, front-wheeled  -CN     Distance in Feet (Gait) 60'  -CN     Deviations/Abnormal Patterns (Gait) ranjit decreased;base of support, narrow;stride length decreased;weight shifting decreased  -CN     Bilateral Gait Deviations forward flexed posture  -CN     Right Sided Gait Deviations decreased knee extension  -CN     Comment, (Gait/Stairs) Very slow gait, easily distracted, no overt LOB  -CN               User Key  (r) = Recorded By, (t) = Taken By, (c) = Cosigned By      Initials Name Provider Type    Kirsty Hawkins, GERI Physical Therapist                   Obj/Interventions    No documentation.                  Goals/Plan    No documentation.                  Clinical Impression       Row Name 12/31/23 1434          Pain    Pretreatment Pain Rating 0/10 - no pain  -CN     Posttreatment Pain Rating 0/10 - no pain  -CN       Row Name 12/31/23 1434          Plan of Care Review    Plan of Care Reviewed With patient  -CN     Progress improving  -CN     Outcome Evaluation Pt requires Chuckie for sit to stand and able to increase ambulation distance to 60' this date, however with forward flexed posture, slow pattern and easily distracted with mobility. Pt would benefit from D/C to snf per safety concerns and to increase activity endurance and strength.  -CN       Row Name 12/31/23 1434          Positioning and Restraints    Pre-Treatment Position sitting in  chair/recliner  -CN     Post Treatment Position chair  -CN     In Chair reclined;call light within reach;exit alarm on  -CN               User Key  (r) = Recorded By, (t) = Taken By, (c) = Cosigned By      Initials Name Provider Type    Kirsty Hawkins, PT Physical Therapist                   Outcome Measures       Row Name 12/31/23 1436 12/31/23 0912       How much help from another person do you currently need...    Turning from your back to your side while in flat bed without using bedrails? 2  -CN 3  -ME    Moving from lying on back to sitting on the side of a flat bed without bedrails? 2  -CN 3  -ME    Moving to and from a bed to a chair (including a wheelchair)? 3  -CN 2  -ME    Standing up from a chair using your arms (e.g., wheelchair, bedside chair)? 3  -CN 2  -ME    Climbing 3-5 steps with a railing? 1  -CN 1  -ME    To walk in hospital room? 3  -CN 2  -ME    AM-PAC 6 Clicks Score (PT) 14  -CN 13  -ME    Highest Level of Mobility Goal 4 --> Transfer to chair/commode  -CN 4 --> Transfer to chair/commode  -ME      Row Name 12/31/23 1436          Functional Assessment    Outcome Measure Options AM-PAC 6 Clicks Basic Mobility (PT)  -CN               User Key  (r) = Recorded By, (t) = Taken By, (c) = Cosigned By      Initials Name Provider Type    Kirsty Hawkins, PT Physical Therapist    ME Shaylee Eason, RN Registered Nurse                                 Physical Therapy Education       Title: PT OT SLP Therapies (In Progress)       Topic: Physical Therapy (Done)       Point: Mobility training (Done)       Learning Progress Summary             Patient Acceptance, E, VU,NR by MIA at 12/31/2023 1437    Acceptance, E,TB,D, VU,NR by AMELIA at 12/29/2023 1130    Acceptance, E, VU by SHLOMO at 12/27/2023 1554    Acceptance, E,TB, VU by KRISTEN at 12/26/2023 1029    Acceptance, E,TB, VU by  at 12/23/2023 1618    Eager, E,TB,D, VU,NR by AMELIA at 12/22/2023 1700    Acceptance, E, VU by CLARISSA at 12/21/2023 3862                          Point: Home exercise program (Done)       Learning Progress Summary             Patient Acceptance, E, VU,NR by MIA at 12/31/2023 1437    Acceptance, E,TB,D, VU,NR by AMELIA at 12/29/2023 1130    Acceptance, E,TB, VU by KRISTEN at 12/26/2023 1029    Acceptance, E,TB, VU by  at 12/23/2023 1618    Eager, E,TB,D, VU,NR by AMELIA at 12/22/2023 1700    Acceptance, E, VU by CLARISSA at 12/21/2023 1713                         Point: Body mechanics (Done)       Learning Progress Summary             Patient Acceptance, E, VU,NR by MIA at 12/31/2023 1437    Acceptance, E,TB,D, VU,NR by AMELIA at 12/29/2023 1130    Acceptance, E, VU by SHLOMO at 12/27/2023 1554    Acceptance, E,TB, VU by KRISTEN at 12/26/2023 1029    Acceptance, E,TB, VU by  at 12/23/2023 1618    Eager, E,TB,D, VU,NR by AMELIA at 12/22/2023 1700    Acceptance, E, VU by CLARISSA at 12/21/2023 1713                         Point: Precautions (Done)       Learning Progress Summary             Patient Acceptance, E, VU,NR by MIA at 12/31/2023 1437    Acceptance, E,TB,D, VU,NR by AMELIA at 12/29/2023 1130    Acceptance, E, VU by SHLOMO at 12/27/2023 1554    Acceptance, E,TB, VU by KRISTEN at 12/26/2023 1029    Acceptance, E,TB, VU by  at 12/23/2023 1618    Eager, E,TB,D, VU,NR by AMELIA at 12/22/2023 1700    Acceptance, E, VU by CLARISSA at 12/21/2023 1713                                         User Key       Initials Effective Dates Name Provider Type Discipline    RD 06/16/21 -  Mariposa Kennedy, PT Physical Therapist PT    AMELIA 03/07/18 -  Rosa Portillo, TULIO Physical Therapist Assistant PT    CN 06/16/21 -  Kirsty Mariscal, PT Physical Therapist PT    DB 06/16/21 -  Felipa Weathers, PT Physical Therapist PT    LW 05/08/23 -  Ashley Bertrand, PT Physical Therapist PT     05/24/23 -  Leobardo Weller, PT Physical Therapist PT                  PT Recommendation and Plan     Plan of Care Reviewed With: patient  Progress: improving  Outcome Evaluation: Pt requires Chuckie for sit to stand and  able to increase ambulation distance to 60' this date, however with forward flexed posture, slow pattern and easily distracted with mobility. Pt would benefit from D/C to snf per safety concerns and to increase activity endurance and strength.     Time Calculation:         PT Charges       Row Name 12/31/23 1437             Time Calculation    Start Time 1356  -CN      Stop Time 1412  -CN      Time Calculation (min) 16 min  -CN      PT Received On 12/31/23  -CN      PT - Next Appointment 01/02/24  -CN         Timed Charges    66194 - PT Therapeutic Activity Minutes 16  -CN         Total Minutes    Timed Charges Total Minutes 16  -CN       Total Minutes 16  -CN                User Key  (r) = Recorded By, (t) = Taken By, (c) = Cosigned By      Initials Name Provider Type    Kirsty Hawkins, GERI Physical Therapist                  Therapy Charges for Today       Code Description Service Date Service Provider Modifiers Qty    [id number]  PT THERAPEUTIC ACT EA 15 MIN 12/31/2023 Kirsty Mariscal, PT GP 1            PT G-Codes  Outcome Measure Options: AM-PAC 6 Clicks Basic Mobility (PT)  AM-PAC 6 Clicks Score (PT): 14  AM-PAC 6 Clicks Score (OT): 14  Modified Carnegie Scale: 4 - Moderately severe disability.  Unable to walk without assistance, and unable to attend to own bodily needs without assistance.  PT Discharge Summary  Anticipated Discharge Disposition (PT): skilled nursing facility    Kirsty Mariscal PT  12/31/2023

## 2023-12-31 NOTE — PLAN OF CARE
Goal Outcome Evaluation:           Progress: no change  Outcome Evaluation: Pt awaiting rehab placement, A&Ox4, using urinal more this shift, does not attempt getting out of bed,  red areas on back and spine with mepilex, continue plan of care.

## 2024-01-01 ENCOUNTER — APPOINTMENT (OUTPATIENT)
Dept: GENERAL RADIOLOGY | Facility: HOSPITAL | Age: 79
End: 2024-01-01
Payer: MEDICARE

## 2024-01-01 ENCOUNTER — APPOINTMENT (OUTPATIENT)
Dept: NEUROLOGY | Facility: HOSPITAL | Age: 79
End: 2024-01-01
Payer: MEDICARE

## 2024-01-01 ENCOUNTER — HOSPITAL ENCOUNTER (INPATIENT)
Facility: HOSPITAL | Age: 79
LOS: 11 days | End: 2024-09-28
Attending: EMERGENCY MEDICINE | Admitting: INTERNAL MEDICINE
Payer: MEDICARE

## 2024-01-01 ENCOUNTER — APPOINTMENT (OUTPATIENT)
Dept: MRI IMAGING | Facility: HOSPITAL | Age: 79
End: 2024-01-01
Payer: MEDICARE

## 2024-01-01 ENCOUNTER — APPOINTMENT (OUTPATIENT)
Dept: CT IMAGING | Facility: HOSPITAL | Age: 79
End: 2024-01-01
Payer: MEDICARE

## 2024-01-01 VITALS
DIASTOLIC BLOOD PRESSURE: 39 MMHG | TEMPERATURE: 97.6 F | BODY MASS INDEX: 21.82 KG/M2 | RESPIRATION RATE: 24 BRPM | HEART RATE: 91 BPM | SYSTOLIC BLOOD PRESSURE: 89 MMHG | OXYGEN SATURATION: 99 % | WEIGHT: 174.6 LBS

## 2024-01-01 DIAGNOSIS — J18.9 PNEUMONIA DUE TO INFECTIOUS ORGANISM, UNSPECIFIED LATERALITY, UNSPECIFIED PART OF LUNG: ICD-10-CM

## 2024-01-01 DIAGNOSIS — A41.9 SEPTIC SHOCK: ICD-10-CM

## 2024-01-01 DIAGNOSIS — L97.429 CHRONIC HEEL ULCER, LEFT, WITH UNSPECIFIED SEVERITY: ICD-10-CM

## 2024-01-01 DIAGNOSIS — G93.40 ACUTE ENCEPHALOPATHY: ICD-10-CM

## 2024-01-01 DIAGNOSIS — E46 MALNUTRITION, UNSPECIFIED TYPE: ICD-10-CM

## 2024-01-01 DIAGNOSIS — Z97.8 INDWELLING FOLEY CATHETER PRESENT: ICD-10-CM

## 2024-01-01 DIAGNOSIS — M45.9 ANKYLOSING SPONDYLITIS, UNSPECIFIED SITE OF SPINE: ICD-10-CM

## 2024-01-01 DIAGNOSIS — L89.159 PRESSURE INJURY OF SKIN OF SACRAL REGION, UNSPECIFIED INJURY STAGE: ICD-10-CM

## 2024-01-01 DIAGNOSIS — R13.10 DYSPHAGIA, UNSPECIFIED TYPE: ICD-10-CM

## 2024-01-01 DIAGNOSIS — Z74.09 IMMOBILITY: ICD-10-CM

## 2024-01-01 DIAGNOSIS — J96.01 ACUTE RESPIRATORY FAILURE WITH HYPOXIA: Primary | ICD-10-CM

## 2024-01-01 DIAGNOSIS — Z93.1 FEEDING BY G-TUBE: ICD-10-CM

## 2024-01-01 DIAGNOSIS — R65.21 SEPTIC SHOCK: ICD-10-CM

## 2024-01-01 LAB
ACE CSF-CCNC: <1.5 U/L (ref 0–3.1)
ALBUMIN SERPL-MCNC: 1.7 G/DL (ref 3.5–5.2)
ALBUMIN SERPL-MCNC: 1.9 G/DL (ref 3.5–5.2)
ALBUMIN SERPL-MCNC: 2.3 G/DL (ref 3.5–5.2)
ALBUMIN SERPL-MCNC: 2.9 G/DL (ref 3.5–5.2)
ALBUMIN/GLOB SERPL: 0.4 G/DL
ALBUMIN/GLOB SERPL: 0.5 G/DL
ALP SERPL-CCNC: 145 U/L (ref 39–117)
ALP SERPL-CCNC: 171 U/L (ref 39–117)
ALP SERPL-CCNC: 177 U/L (ref 39–117)
ALP SERPL-CCNC: 70 U/L (ref 39–117)
ALT SERPL W P-5'-P-CCNC: 16 U/L (ref 1–41)
ALT SERPL W P-5'-P-CCNC: 24 U/L (ref 1–41)
ALT SERPL W P-5'-P-CCNC: 24 U/L (ref 1–41)
ALT SERPL W P-5'-P-CCNC: 27 U/L (ref 1–41)
ANION GAP SERPL CALCULATED.3IONS-SCNC: 11 MMOL/L (ref 5–15)
ANION GAP SERPL CALCULATED.3IONS-SCNC: 13 MMOL/L (ref 5–15)
ANION GAP SERPL CALCULATED.3IONS-SCNC: 3.1 MMOL/L (ref 5–15)
ANION GAP SERPL CALCULATED.3IONS-SCNC: 4.2 MMOL/L (ref 5–15)
ANION GAP SERPL CALCULATED.3IONS-SCNC: 5.9 MMOL/L (ref 5–15)
ANION GAP SERPL CALCULATED.3IONS-SCNC: 6 MMOL/L (ref 5–15)
ANION GAP SERPL CALCULATED.3IONS-SCNC: 6 MMOL/L (ref 5–15)
ANION GAP SERPL CALCULATED.3IONS-SCNC: 6.7 MMOL/L (ref 5–15)
ANION GAP SERPL CALCULATED.3IONS-SCNC: 7 MMOL/L (ref 5–15)
ANION GAP SERPL CALCULATED.3IONS-SCNC: 9.1 MMOL/L (ref 5–15)
APPEARANCE CSF: ABNORMAL
APPEARANCE CSF: CLEAR
ARTERIAL PATENCY WRIST A: ABNORMAL
ARTERIAL PATENCY WRIST A: ABNORMAL
ARTERIAL PATENCY WRIST A: POSITIVE
AST SERPL-CCNC: 26 U/L (ref 1–40)
AST SERPL-CCNC: 27 U/L (ref 1–40)
AST SERPL-CCNC: 39 U/L (ref 1–40)
AST SERPL-CCNC: 45 U/L (ref 1–40)
ATMOSPHERIC PRESS: 744 MMHG
ATMOSPHERIC PRESS: 746.7 MMHG
ATMOSPHERIC PRESS: 747.2 MMHG
ATMOSPHERIC PRESS: 747.8 MMHG
ATMOSPHERIC PRESS: 750 MMHG
ATMOSPHERIC PRESS: 751.1 MMHG
ATMOSPHERIC PRESS: 751.2 MMHG
B PARAPERT DNA SPEC QL NAA+PROBE: NOT DETECTED
B PERT DNA SPEC QL NAA+PROBE: NOT DETECTED
BACTERIA ISLT: NORMAL
BACTERIA SPEC AEROBE CULT: NO GROWTH
BACTERIA SPEC AEROBE CULT: NORMAL
BACTERIA SPEC AEROBE CULT: NORMAL
BACTERIA SPEC RESP CULT: ABNORMAL
BACTERIA UR QL AUTO: ABNORMAL /HPF
BASE EXCESS BLDA CALC-SCNC: -0.5 MMOL/L (ref 0–2)
BASE EXCESS BLDA CALC-SCNC: -0.9 MMOL/L (ref 0–2)
BASE EXCESS BLDA CALC-SCNC: -4.4 MMOL/L (ref 0–2)
BASE EXCESS BLDA CALC-SCNC: 0.4 MMOL/L (ref 0–2)
BASE EXCESS BLDA CALC-SCNC: 0.7 MMOL/L (ref 0–2)
BASE EXCESS BLDA CALC-SCNC: 1.2 MMOL/L (ref 0–2)
BASE EXCESS BLDA CALC-SCNC: 5.3 MMOL/L (ref 0–2)
BASE EXCESS BLDA CALC-SCNC: 6.8 MMOL/L (ref 0–2)
BASE EXCESS BLDA CALC-SCNC: 8.9 MMOL/L (ref 0–2)
BASOPHILS # BLD AUTO: 0.04 10*3/MM3 (ref 0–0.2)
BASOPHILS # BLD AUTO: 0.05 10*3/MM3 (ref 0–0.2)
BASOPHILS # BLD AUTO: 0.07 10*3/MM3 (ref 0–0.2)
BASOPHILS # BLD MANUAL: 0 10*3/MM3 (ref 0–0.2)
BASOPHILS # BLD MANUAL: 0 10*3/MM3 (ref 0–0.2)
BASOPHILS NFR BLD AUTO: 0.3 % (ref 0–1.5)
BASOPHILS NFR BLD AUTO: 0.5 % (ref 0–1.5)
BASOPHILS NFR BLD AUTO: 0.5 % (ref 0–1.5)
BASOPHILS NFR BLD MANUAL: 0 % (ref 0–1.5)
BASOPHILS NFR BLD MANUAL: 0 % (ref 0–1.5)
BDY SITE: ABNORMAL
BDY SITE: NORMAL
BILIRUB CONJ SERPL-MCNC: 0.3 MG/DL (ref 0–0.3)
BILIRUB CONJ SERPL-MCNC: 0.3 MG/DL (ref 0–0.3)
BILIRUB INDIRECT SERPL-MCNC: 0.1 MG/DL
BILIRUB INDIRECT SERPL-MCNC: 0.3 MG/DL
BILIRUB SERPL-MCNC: 0.4 MG/DL (ref 0–1.2)
BILIRUB SERPL-MCNC: 0.4 MG/DL (ref 0–1.2)
BILIRUB SERPL-MCNC: 0.5 MG/DL (ref 0–1.2)
BILIRUB SERPL-MCNC: 0.6 MG/DL (ref 0–1.2)
BILIRUB UR QL STRIP: NEGATIVE
BUN SERPL-MCNC: 10 MG/DL (ref 8–23)
BUN SERPL-MCNC: 11 MG/DL (ref 8–23)
BUN SERPL-MCNC: 17 MG/DL (ref 8–23)
BUN SERPL-MCNC: 17 MG/DL (ref 8–23)
BUN SERPL-MCNC: 18 MG/DL (ref 8–23)
BUN SERPL-MCNC: 20 MG/DL (ref 8–23)
BUN SERPL-MCNC: 23 MG/DL (ref 8–23)
BUN SERPL-MCNC: 23 MG/DL (ref 8–23)
BUN SERPL-MCNC: 24 MG/DL (ref 8–23)
BUN SERPL-MCNC: 28 MG/DL (ref 8–23)
BUN/CREAT SERPL: 22.6 (ref 7–25)
BUN/CREAT SERPL: 24.4 (ref 7–25)
BUN/CREAT SERPL: 27.4 (ref 7–25)
BUN/CREAT SERPL: 28.9 (ref 7–25)
BUN/CREAT SERPL: 33.3 (ref 7–25)
BUN/CREAT SERPL: 34 (ref 7–25)
BUN/CREAT SERPL: 39.5 (ref 7–25)
BUN/CREAT SERPL: 50 (ref 7–25)
BUN/CREAT SERPL: 50.9 (ref 7–25)
BUN/CREAT SERPL: 51.1 (ref 7–25)
C PNEUM DNA NPH QL NAA+NON-PROBE: NOT DETECTED
CALCIUM SPEC-SCNC: 7.3 MG/DL (ref 8.6–10.5)
CALCIUM SPEC-SCNC: 7.7 MG/DL (ref 8.6–10.5)
CALCIUM SPEC-SCNC: 7.7 MG/DL (ref 8.6–10.5)
CALCIUM SPEC-SCNC: 8 MG/DL (ref 8.6–10.5)
CALCIUM SPEC-SCNC: 8.1 MG/DL (ref 8.6–10.5)
CALCIUM SPEC-SCNC: 8.3 MG/DL (ref 8.6–10.5)
CALCIUM SPEC-SCNC: 8.3 MG/DL (ref 8.6–10.5)
CALCIUM SPEC-SCNC: 8.5 MG/DL (ref 8.6–10.5)
CALCIUM SPEC-SCNC: 8.5 MG/DL (ref 8.6–10.5)
CALCIUM SPEC-SCNC: 9 MG/DL (ref 8.6–10.5)
CHLORIDE SERPL-SCNC: 101 MMOL/L (ref 98–107)
CHLORIDE SERPL-SCNC: 104 MMOL/L (ref 98–107)
CHLORIDE SERPL-SCNC: 109 MMOL/L (ref 98–107)
CHLORIDE SERPL-SCNC: 114 MMOL/L (ref 98–107)
CHLORIDE SERPL-SCNC: 115 MMOL/L (ref 98–107)
CHLORIDE SERPL-SCNC: 117 MMOL/L (ref 98–107)
CHLORIDE SERPL-SCNC: 118 MMOL/L (ref 98–107)
CHLORIDE SERPL-SCNC: 120 MMOL/L (ref 98–107)
CHLORIDE SERPL-SCNC: 121 MMOL/L (ref 98–107)
CHLORIDE SERPL-SCNC: 125 MMOL/L (ref 98–107)
CLARITY UR: ABNORMAL
CMV DNA SPEC QL NAA+PROBE: NEGATIVE
CO2 BLDA-SCNC: 23.3 MMOL/L (ref 23–27)
CO2 BLDA-SCNC: 23.4 MMOL/L (ref 23–27)
CO2 BLDA-SCNC: 24.4 MMOL/L (ref 23–27)
CO2 BLDA-SCNC: 25.1 MMOL/L (ref 23–27)
CO2 BLDA-SCNC: 26.1 MMOL/L (ref 23–27)
CO2 BLDA-SCNC: 29.7 MMOL/L (ref 23–27)
CO2 BLDA-SCNC: 29.7 MMOL/L (ref 23–27)
CO2 BLDA-SCNC: >32.45 MMOL/L (ref 23–27)
CO2 BLDA-SCNC: >32.45 MMOL/L (ref 23–27)
CO2 SERPL-SCNC: 17 MMOL/L (ref 22–29)
CO2 SERPL-SCNC: 21.9 MMOL/L (ref 22–29)
CO2 SERPL-SCNC: 23 MMOL/L (ref 22–29)
CO2 SERPL-SCNC: 24.1 MMOL/L (ref 22–29)
CO2 SERPL-SCNC: 24.3 MMOL/L (ref 22–29)
CO2 SERPL-SCNC: 25.9 MMOL/L (ref 22–29)
CO2 SERPL-SCNC: 27 MMOL/L (ref 22–29)
CO2 SERPL-SCNC: 28 MMOL/L (ref 22–29)
CO2 SERPL-SCNC: 28 MMOL/L (ref 22–29)
CO2 SERPL-SCNC: 31.8 MMOL/L (ref 22–29)
COLOR CSF: ABNORMAL
COLOR CSF: COLORLESS
COLOR SPUN CSF: COLORLESS
COLOR UR: YELLOW
CREAT SERPL-MCNC: 0.36 MG/DL (ref 0.76–1.27)
CREAT SERPL-MCNC: 0.38 MG/DL (ref 0.76–1.27)
CREAT SERPL-MCNC: 0.4 MG/DL (ref 0.76–1.27)
CREAT SERPL-MCNC: 0.41 MG/DL (ref 0.76–1.27)
CREAT SERPL-MCNC: 0.42 MG/DL (ref 0.76–1.27)
CREAT SERPL-MCNC: 0.43 MG/DL (ref 0.76–1.27)
CREAT SERPL-MCNC: 0.45 MG/DL (ref 0.76–1.27)
CREAT SERPL-MCNC: 0.51 MG/DL (ref 0.76–1.27)
CREAT SERPL-MCNC: 0.53 MG/DL (ref 0.76–1.27)
CREAT SERPL-MCNC: 0.55 MG/DL (ref 0.76–1.27)
CREAT SERPL-MCNC: 0.84 MG/DL (ref 0.76–1.27)
CREAT SERPL-MCNC: 1.06 MG/DL (ref 0.76–1.27)
CREAT UR-MCNC: 22.6 MG/DL
CRYPTOC AG CSF QL LA: NEGATIVE
D-LACTATE SERPL-SCNC: 2 MMOL/L (ref 0.5–2)
D-LACTATE SERPL-SCNC: 2.7 MMOL/L (ref 0.5–2)
D-LACTATE SERPL-SCNC: 4.9 MMOL/L (ref 0.5–2)
D-LACTATE SERPL-SCNC: 5.2 MMOL/L (ref 0.5–2)
D-LACTATE SERPL-SCNC: 5.3 MMOL/L (ref 0.5–2)
D-LACTATE SERPL-SCNC: 6.3 MMOL/L (ref 0.5–2)
DACRYOCYTES BLD QL SMEAR: ABNORMAL
DEPRECATED RDW RBC AUTO: 45.3 FL (ref 37–54)
DEPRECATED RDW RBC AUTO: 46.7 FL (ref 37–54)
DEPRECATED RDW RBC AUTO: 46.9 FL (ref 37–54)
DEPRECATED RDW RBC AUTO: 47.5 FL (ref 37–54)
DEPRECATED RDW RBC AUTO: 48.2 FL (ref 37–54)
DEPRECATED RDW RBC AUTO: 48.8 FL (ref 37–54)
DEPRECATED RDW RBC AUTO: 48.9 FL (ref 37–54)
DEPRECATED RDW RBC AUTO: 49 FL (ref 37–54)
DEPRECATED RDW RBC AUTO: 49.6 FL (ref 37–54)
DEPRECATED RDW RBC AUTO: 49.7 FL (ref 37–54)
DEVICE COMMENT: ABNORMAL
DEVICE COMMENT: NORMAL
EGFRCR SERPLBLD CKD-EPI 2021: 100.2 ML/MIN/1.73
EGFRCR SERPLBLD CKD-EPI 2021: 101.3 ML/MIN/1.73
EGFRCR SERPLBLD CKD-EPI 2021: 102.5 ML/MIN/1.73
EGFRCR SERPLBLD CKD-EPI 2021: 106.4 ML/MIN/1.73
EGFRCR SERPLBLD CKD-EPI 2021: 107.9 ML/MIN/1.73
EGFRCR SERPLBLD CKD-EPI 2021: 108.7 ML/MIN/1.73
EGFRCR SERPLBLD CKD-EPI 2021: 109.5 ML/MIN/1.73
EGFRCR SERPLBLD CKD-EPI 2021: 110.3 ML/MIN/1.73
EGFRCR SERPLBLD CKD-EPI 2021: 112 ML/MIN/1.73
EGFRCR SERPLBLD CKD-EPI 2021: 113.9 ML/MIN/1.73
EGFRCR SERPLBLD CKD-EPI 2021: 70.9 ML/MIN/1.73
EGFRCR SERPLBLD CKD-EPI 2021: 88.2 ML/MIN/1.73
EOSINOPHIL # BLD AUTO: 0.11 10*3/MM3 (ref 0–0.4)
EOSINOPHIL # BLD AUTO: 0.72 10*3/MM3 (ref 0–0.4)
EOSINOPHIL # BLD AUTO: 0.73 10*3/MM3 (ref 0–0.4)
EOSINOPHIL # BLD MANUAL: 0 10*3/MM3 (ref 0–0.4)
EOSINOPHIL # BLD MANUAL: 0 10*3/MM3 (ref 0–0.4)
EOSINOPHIL NFR BLD AUTO: 0.7 % (ref 0.3–6.2)
EOSINOPHIL NFR BLD AUTO: 4.9 % (ref 0.3–6.2)
EOSINOPHIL NFR BLD AUTO: 6.8 % (ref 0.3–6.2)
EOSINOPHIL NFR BLD MANUAL: 0 % (ref 0.3–6.2)
EOSINOPHIL NFR BLD MANUAL: 0 % (ref 0.3–6.2)
EOSINOPHIL NFR CSF MANUAL: 4 %
ERYTHROCYTE [DISTWIDTH] IN BLOOD BY AUTOMATED COUNT: 15.3 % (ref 12.3–15.4)
ERYTHROCYTE [DISTWIDTH] IN BLOOD BY AUTOMATED COUNT: 15.5 % (ref 12.3–15.4)
ERYTHROCYTE [DISTWIDTH] IN BLOOD BY AUTOMATED COUNT: 15.6 % (ref 12.3–15.4)
ERYTHROCYTE [DISTWIDTH] IN BLOOD BY AUTOMATED COUNT: 15.7 % (ref 12.3–15.4)
ERYTHROCYTE [DISTWIDTH] IN BLOOD BY AUTOMATED COUNT: 15.8 % (ref 12.3–15.4)
ERYTHROCYTE [DISTWIDTH] IN BLOOD BY AUTOMATED COUNT: 15.9 % (ref 12.3–15.4)
ERYTHROCYTE [DISTWIDTH] IN BLOOD BY AUTOMATED COUNT: 16 % (ref 12.3–15.4)
ERYTHROCYTE [DISTWIDTH] IN BLOOD BY AUTOMATED COUNT: 16 % (ref 12.3–15.4)
FLUAV SUBTYP SPEC NAA+PROBE: NOT DETECTED
FLUBV RNA ISLT QL NAA+PROBE: NOT DETECTED
GAS FLOW AIRWAY: 15 LPM
GEN 5 2HR TROPONIN T REFLEX: 50 NG/L
GLOBULIN UR ELPH-MCNC: 4.5 GM/DL
GLOBULIN UR ELPH-MCNC: 4.8 GM/DL
GLUCOSE BLDC GLUCOMTR-MCNC: 101 MG/DL (ref 70–130)
GLUCOSE BLDC GLUCOMTR-MCNC: 102 MG/DL (ref 70–130)
GLUCOSE BLDC GLUCOMTR-MCNC: 104 MG/DL (ref 70–130)
GLUCOSE BLDC GLUCOMTR-MCNC: 104 MG/DL (ref 70–130)
GLUCOSE BLDC GLUCOMTR-MCNC: 106 MG/DL (ref 70–130)
GLUCOSE BLDC GLUCOMTR-MCNC: 110 MG/DL (ref 70–130)
GLUCOSE BLDC GLUCOMTR-MCNC: 111 MG/DL (ref 70–130)
GLUCOSE BLDC GLUCOMTR-MCNC: 112 MG/DL (ref 70–130)
GLUCOSE BLDC GLUCOMTR-MCNC: 113 MG/DL (ref 70–130)
GLUCOSE BLDC GLUCOMTR-MCNC: 114 MG/DL (ref 70–130)
GLUCOSE BLDC GLUCOMTR-MCNC: 116 MG/DL (ref 70–130)
GLUCOSE BLDC GLUCOMTR-MCNC: 118 MG/DL (ref 70–130)
GLUCOSE BLDC GLUCOMTR-MCNC: 120 MG/DL (ref 70–130)
GLUCOSE BLDC GLUCOMTR-MCNC: 122 MG/DL (ref 70–130)
GLUCOSE BLDC GLUCOMTR-MCNC: 126 MG/DL (ref 70–130)
GLUCOSE BLDC GLUCOMTR-MCNC: 133 MG/DL (ref 70–130)
GLUCOSE BLDC GLUCOMTR-MCNC: 133 MG/DL (ref 70–130)
GLUCOSE BLDC GLUCOMTR-MCNC: 136 MG/DL (ref 70–130)
GLUCOSE BLDC GLUCOMTR-MCNC: 142 MG/DL (ref 70–130)
GLUCOSE BLDC GLUCOMTR-MCNC: 143 MG/DL (ref 70–130)
GLUCOSE BLDC GLUCOMTR-MCNC: 47 MG/DL (ref 70–130)
GLUCOSE BLDC GLUCOMTR-MCNC: 50 MG/DL (ref 70–130)
GLUCOSE BLDC GLUCOMTR-MCNC: 52 MG/DL (ref 70–130)
GLUCOSE BLDC GLUCOMTR-MCNC: 52 MG/DL (ref 70–130)
GLUCOSE BLDC GLUCOMTR-MCNC: 53 MG/DL (ref 70–130)
GLUCOSE BLDC GLUCOMTR-MCNC: 56 MG/DL (ref 70–130)
GLUCOSE BLDC GLUCOMTR-MCNC: 57 MG/DL (ref 70–130)
GLUCOSE BLDC GLUCOMTR-MCNC: 58 MG/DL (ref 70–130)
GLUCOSE BLDC GLUCOMTR-MCNC: 58 MG/DL (ref 70–130)
GLUCOSE BLDC GLUCOMTR-MCNC: 59 MG/DL (ref 70–130)
GLUCOSE BLDC GLUCOMTR-MCNC: 61 MG/DL (ref 70–130)
GLUCOSE BLDC GLUCOMTR-MCNC: 61 MG/DL (ref 70–130)
GLUCOSE BLDC GLUCOMTR-MCNC: 63 MG/DL (ref 70–130)
GLUCOSE BLDC GLUCOMTR-MCNC: 64 MG/DL (ref 70–130)
GLUCOSE BLDC GLUCOMTR-MCNC: 68 MG/DL (ref 70–130)
GLUCOSE BLDC GLUCOMTR-MCNC: 70 MG/DL (ref 70–130)
GLUCOSE BLDC GLUCOMTR-MCNC: 71 MG/DL (ref 70–130)
GLUCOSE BLDC GLUCOMTR-MCNC: 72 MG/DL (ref 70–130)
GLUCOSE BLDC GLUCOMTR-MCNC: 73 MG/DL (ref 70–130)
GLUCOSE BLDC GLUCOMTR-MCNC: 74 MG/DL (ref 70–130)
GLUCOSE BLDC GLUCOMTR-MCNC: 75 MG/DL (ref 70–130)
GLUCOSE BLDC GLUCOMTR-MCNC: 75 MG/DL (ref 70–130)
GLUCOSE BLDC GLUCOMTR-MCNC: 76 MG/DL (ref 70–130)
GLUCOSE BLDC GLUCOMTR-MCNC: 77 MG/DL (ref 70–130)
GLUCOSE BLDC GLUCOMTR-MCNC: 78 MG/DL (ref 70–130)
GLUCOSE BLDC GLUCOMTR-MCNC: 84 MG/DL (ref 70–130)
GLUCOSE BLDC GLUCOMTR-MCNC: 86 MG/DL (ref 70–130)
GLUCOSE BLDC GLUCOMTR-MCNC: 86 MG/DL (ref 70–130)
GLUCOSE BLDC GLUCOMTR-MCNC: 87 MG/DL (ref 70–130)
GLUCOSE BLDC GLUCOMTR-MCNC: 88 MG/DL (ref 70–130)
GLUCOSE BLDC GLUCOMTR-MCNC: 91 MG/DL (ref 70–130)
GLUCOSE BLDC GLUCOMTR-MCNC: 91 MG/DL (ref 70–130)
GLUCOSE BLDC GLUCOMTR-MCNC: 92 MG/DL (ref 70–130)
GLUCOSE BLDC GLUCOMTR-MCNC: 92 MG/DL (ref 70–130)
GLUCOSE BLDC GLUCOMTR-MCNC: 93 MG/DL (ref 70–130)
GLUCOSE BLDC GLUCOMTR-MCNC: 94 MG/DL (ref 70–130)
GLUCOSE BLDC GLUCOMTR-MCNC: 96 MG/DL (ref 70–130)
GLUCOSE BLDC GLUCOMTR-MCNC: 98 MG/DL (ref 70–130)
GLUCOSE BLDC GLUCOMTR-MCNC: 98 MG/DL (ref 70–130)
GLUCOSE BLDC GLUCOMTR-MCNC: 99 MG/DL (ref 70–130)
GLUCOSE CSF-MCNC: 63 MG/DL (ref 40–70)
GLUCOSE SERPL-MCNC: 102 MG/DL (ref 65–99)
GLUCOSE SERPL-MCNC: 117 MG/DL (ref 65–99)
GLUCOSE SERPL-MCNC: 117 MG/DL (ref 65–99)
GLUCOSE SERPL-MCNC: 126 MG/DL (ref 65–99)
GLUCOSE SERPL-MCNC: 130 MG/DL (ref 65–99)
GLUCOSE SERPL-MCNC: 63 MG/DL (ref 65–99)
GLUCOSE SERPL-MCNC: 80 MG/DL (ref 65–99)
GLUCOSE SERPL-MCNC: 84 MG/DL (ref 65–99)
GLUCOSE SERPL-MCNC: 87 MG/DL (ref 65–99)
GLUCOSE SERPL-MCNC: 92 MG/DL (ref 65–99)
GLUCOSE UR STRIP-MCNC: NEGATIVE MG/DL
GRAM STN SPEC: ABNORMAL
HADV DNA SPEC NAA+PROBE: NOT DETECTED
HCO3 BLDA-SCNC: 21.9 MMOL/L (ref 22–28)
HCO3 BLDA-SCNC: 22.6 MMOL/L (ref 22–28)
HCO3 BLDA-SCNC: 23.3 MMOL/L (ref 22–28)
HCO3 BLDA-SCNC: 24 MMOL/L (ref 22–28)
HCO3 BLDA-SCNC: 25 MMOL/L (ref 22–28)
HCO3 BLDA-SCNC: 27.7 MMOL/L (ref 22–28)
HCO3 BLDA-SCNC: 28.6 MMOL/L (ref 22–28)
HCO3 BLDA-SCNC: 31.6 MMOL/L (ref 22–28)
HCO3 BLDA-SCNC: 33.3 MMOL/L (ref 22–28)
HCOV 229E RNA SPEC QL NAA+PROBE: NOT DETECTED
HCOV HKU1 RNA SPEC QL NAA+PROBE: NOT DETECTED
HCOV NL63 RNA SPEC QL NAA+PROBE: NOT DETECTED
HCOV OC43 RNA SPEC QL NAA+PROBE: NOT DETECTED
HCT VFR BLD AUTO: 22.4 % (ref 37.5–51)
HCT VFR BLD AUTO: 23.9 % (ref 37.5–51)
HCT VFR BLD AUTO: 24.5 % (ref 37.5–51)
HCT VFR BLD AUTO: 26 % (ref 37.5–51)
HCT VFR BLD AUTO: 26.5 % (ref 37.5–51)
HCT VFR BLD AUTO: 26.9 % (ref 37.5–51)
HCT VFR BLD AUTO: 27.3 % (ref 37.5–51)
HCT VFR BLD AUTO: 28.5 % (ref 37.5–51)
HCT VFR BLD AUTO: 29.3 % (ref 37.5–51)
HCT VFR BLD AUTO: 30.4 % (ref 37.5–51)
HEMODILUTION: NO
HGB BLD-MCNC: 7.1 G/DL (ref 13–17.7)
HGB BLD-MCNC: 7.5 G/DL (ref 13–17.7)
HGB BLD-MCNC: 7.7 G/DL (ref 13–17.7)
HGB BLD-MCNC: 8.2 G/DL (ref 13–17.7)
HGB BLD-MCNC: 8.3 G/DL (ref 13–17.7)
HGB BLD-MCNC: 8.5 G/DL (ref 13–17.7)
HGB BLD-MCNC: 8.5 G/DL (ref 13–17.7)
HGB BLD-MCNC: 8.7 G/DL (ref 13–17.7)
HGB BLD-MCNC: 9.3 G/DL (ref 13–17.7)
HGB BLD-MCNC: 9.7 G/DL (ref 13–17.7)
HGB UR QL STRIP.AUTO: ABNORMAL
HMPV RNA NPH QL NAA+NON-PROBE: NOT DETECTED
HOLD SPECIMEN: NORMAL
HPIV1 RNA ISLT QL NAA+PROBE: NOT DETECTED
HPIV2 RNA SPEC QL NAA+PROBE: NOT DETECTED
HPIV3 RNA NPH QL NAA+PROBE: NOT DETECTED
HPIV4 P GENE NPH QL NAA+PROBE: NOT DETECTED
HYALINE CASTS UR QL AUTO: ABNORMAL /LPF
IMM GRANULOCYTES # BLD AUTO: 0.06 10*3/MM3 (ref 0–0.05)
IMM GRANULOCYTES # BLD AUTO: 0.08 10*3/MM3 (ref 0–0.05)
IMM GRANULOCYTES # BLD AUTO: 0.12 10*3/MM3 (ref 0–0.05)
IMM GRANULOCYTES NFR BLD AUTO: 0.5 % (ref 0–0.5)
IMM GRANULOCYTES NFR BLD AUTO: 0.6 % (ref 0–0.5)
IMM GRANULOCYTES NFR BLD AUTO: 0.8 % (ref 0–0.5)
INHALED O2 CONCENTRATION: 100 %
INHALED O2 CONCENTRATION: 25 %
INHALED O2 CONCENTRATION: 25 %
INHALED O2 CONCENTRATION: 40 %
INHALED O2 CONCENTRATION: 50 %
INHALED O2 CONCENTRATION: 50 %
INHALED O2 CONCENTRATION: 60 %
INHALED O2 CONCENTRATION: 65 %
INSPIRATORY TIME: 1
KETONES UR QL STRIP: NEGATIVE
L PNEUMO1 AG UR QL IA: NEGATIVE
LEUKOCYTE ESTERASE UR QL STRIP.AUTO: ABNORMAL
LIPASE SERPL-CCNC: 8 U/L (ref 13–60)
LYMPHOCYTES # BLD AUTO: 0.71 10*3/MM3 (ref 0.7–3.1)
LYMPHOCYTES # BLD AUTO: 1 10*3/MM3 (ref 0.7–3.1)
LYMPHOCYTES # BLD AUTO: 1.34 10*3/MM3 (ref 0.7–3.1)
LYMPHOCYTES # BLD MANUAL: 0.33 10*3/MM3 (ref 0.7–3.1)
LYMPHOCYTES # BLD MANUAL: 0.55 10*3/MM3 (ref 0.7–3.1)
LYMPHOCYTES # BLD MANUAL: 0.88 10*3/MM3 (ref 0.7–3.1)
LYMPHOCYTES NFR BLD AUTO: 4.6 % (ref 19.6–45.3)
LYMPHOCYTES NFR BLD AUTO: 9 % (ref 19.6–45.3)
LYMPHOCYTES NFR BLD AUTO: 9.5 % (ref 19.6–45.3)
LYMPHOCYTES NFR BLD MANUAL: 4.1 % (ref 5–12)
LYMPHOCYTES NFR BLD MANUAL: 7 % (ref 5–12)
LYMPHOCYTES NFR BLD MANUAL: 9.2 % (ref 5–12)
LYMPHOCYTES NFR CSF MANUAL: 12 %
Lab: ABNORMAL
Lab: ABNORMAL
M PNEUMO IGG SER IA-ACNC: NOT DETECTED
MAGNESIUM SERPL-MCNC: 1.6 MG/DL (ref 1.6–2.4)
MAGNESIUM SERPL-MCNC: 1.8 MG/DL (ref 1.6–2.4)
MAGNESIUM SERPL-MCNC: 1.9 MG/DL (ref 1.6–2.4)
MAGNESIUM SERPL-MCNC: 2 MG/DL (ref 1.6–2.4)
MAGNESIUM SERPL-MCNC: 2.2 MG/DL (ref 1.6–2.4)
MCH RBC QN AUTO: 25.2 PG (ref 26.6–33)
MCH RBC QN AUTO: 25.9 PG (ref 26.6–33)
MCH RBC QN AUTO: 26 PG (ref 26.6–33)
MCH RBC QN AUTO: 26.4 PG (ref 26.6–33)
MCH RBC QN AUTO: 26.6 PG (ref 26.6–33)
MCH RBC QN AUTO: 27.1 PG (ref 26.6–33)
MCH RBC QN AUTO: 27.3 PG (ref 26.6–33)
MCH RBC QN AUTO: 27.4 PG (ref 26.6–33)
MCHC RBC AUTO-ENTMCNC: 29.8 G/DL (ref 31.5–35.7)
MCHC RBC AUTO-ENTMCNC: 30 G/DL (ref 31.5–35.7)
MCHC RBC AUTO-ENTMCNC: 30.9 G/DL (ref 31.5–35.7)
MCHC RBC AUTO-ENTMCNC: 31.4 G/DL (ref 31.5–35.7)
MCHC RBC AUTO-ENTMCNC: 31.4 G/DL (ref 31.5–35.7)
MCHC RBC AUTO-ENTMCNC: 31.7 G/DL (ref 31.5–35.7)
MCHC RBC AUTO-ENTMCNC: 31.7 G/DL (ref 31.5–35.7)
MCHC RBC AUTO-ENTMCNC: 31.9 G/DL (ref 31.5–35.7)
MCHC RBC AUTO-ENTMCNC: 32.7 G/DL (ref 31.5–35.7)
MCHC RBC AUTO-ENTMCNC: 32.8 G/DL (ref 31.5–35.7)
MCV RBC AUTO: 80.5 FL (ref 79–97)
MCV RBC AUTO: 82.1 FL (ref 79–97)
MCV RBC AUTO: 82.4 FL (ref 79–97)
MCV RBC AUTO: 82.8 FL (ref 79–97)
MCV RBC AUTO: 84.3 FL (ref 79–97)
MCV RBC AUTO: 84.6 FL (ref 79–97)
MCV RBC AUTO: 84.8 FL (ref 79–97)
MCV RBC AUTO: 85.6 FL (ref 79–97)
MCV RBC AUTO: 86.2 FL (ref 79–97)
MCV RBC AUTO: 86.7 FL (ref 79–97)
MEAN AIRWAY PRESSURE: 10
METHOD: ABNORMAL
METHOD: ABNORMAL
MODALITY: ABNORMAL
MODALITY: NORMAL
MONOCYTES # BLD AUTO: 0.37 10*3/MM3 (ref 0.1–0.9)
MONOCYTES # BLD AUTO: 0.38 10*3/MM3 (ref 0.1–0.9)
MONOCYTES # BLD AUTO: 0.71 10*3/MM3 (ref 0.1–0.9)
MONOCYTES # BLD: 0.55 10*3/MM3 (ref 0.1–0.9)
MONOCYTES # BLD: 0.6 10*3/MM3 (ref 0.1–0.9)
MONOCYTES # BLD: 1.03 10*3/MM3 (ref 0.1–0.9)
MONOCYTES NFR BLD AUTO: 2.5 % (ref 5–12)
MONOCYTES NFR BLD AUTO: 3.5 % (ref 5–12)
MONOCYTES NFR BLD AUTO: 4.8 % (ref 5–12)
MONOCYTES NFR CSF MANUAL: 1 %
MRSA DNA SPEC QL NAA+PROBE: ABNORMAL
NEUTROPHILS # BLD AUTO: 12.41 10*3/MM3 (ref 1.7–7)
NEUTROPHILS # BLD AUTO: 12.81 10*3/MM3 (ref 1.7–7)
NEUTROPHILS # BLD AUTO: 5.58 10*3/MM3 (ref 1.7–7)
NEUTROPHILS NFR BLD AUTO: 11.94 10*3/MM3 (ref 1.7–7)
NEUTROPHILS NFR BLD AUTO: 14.06 10*3/MM3 (ref 1.7–7)
NEUTROPHILS NFR BLD AUTO: 79.1 % (ref 42.7–76)
NEUTROPHILS NFR BLD AUTO: 8.38 10*3/MM3 (ref 1.7–7)
NEUTROPHILS NFR BLD AUTO: 80 % (ref 42.7–76)
NEUTROPHILS NFR BLD AUTO: 91.4 % (ref 42.7–76)
NEUTROPHILS NFR BLD MANUAL: 85.7 % (ref 42.7–76)
NEUTROPHILS NFR BLD MANUAL: 87 % (ref 42.7–76)
NEUTROPHILS NFR BLD MANUAL: 91.8 % (ref 42.7–76)
NEUTROPHILS NFR CSF MICRO: 83 %
NITRITE UR QL STRIP: NEGATIVE
NOTIFIED WHO: ABNORMAL
NOTIFIED WHO: ABNORMAL
NRBC BLD AUTO-RTO: 0 /100 WBC (ref 0–0.2)
NUC CELL # CSF MANUAL: 25 /MM3 (ref 0–5)
NUC CELL # CSF MANUAL: 3 /MM3 (ref 0–5)
O2 A-A PPRESDIFF RESPIRATORY: 0.2 MMHG
O2 A-A PPRESDIFF RESPIRATORY: 0.2 MMHG
O2 A-A PPRESDIFF RESPIRATORY: 0.3 MMHG
O2 A-A PPRESDIFF RESPIRATORY: 0.3 MMHG
O2 A-A PPRESDIFF RESPIRATORY: 0.4 MMHG
O2 A-A PPRESDIFF RESPIRATORY: 0.5 MMHG
OSMOLALITY SERPL: 324 MOSM/KG (ref 280–301)
OSMOLALITY UR: 471 MOSM/KG
PAW @ PEAK INSP FLOW SETTING VENT: 16 CMH2O
PAW @ PEAK INSP FLOW SETTING VENT: 21 CMH2O
PCO2 BLDA: 28.8 MM HG (ref 35–45)
PCO2 BLDA: 32.6 MM HG (ref 35–45)
PCO2 BLDA: 35.7 MM HG (ref 35–45)
PCO2 BLDA: 36 MM HG (ref 35–45)
PCO2 BLDA: 37.8 MM HG (ref 35–45)
PCO2 BLDA: 44.5 MM HG (ref 35–45)
PCO2 BLDA: 45.1 MM HG (ref 35–45)
PCO2 BLDA: 45.4 MM HG (ref 35–45)
PCO2 BLDA: 64.2 MM HG (ref 35–45)
PEEP RESPIRATORY: 5 CM[H2O]
PEEP RESPIRATORY: 6 CM[H2O]
PH BLDA: 7.24 PH UNITS (ref 7.35–7.45)
PH BLDA: 7.29 PH UNITS (ref 7.35–7.45)
PH BLDA: 7.42 PH UNITS (ref 7.35–7.45)
PH BLDA: 7.43 PH UNITS (ref 7.35–7.45)
PH BLDA: 7.45 PH UNITS (ref 7.35–7.45)
PH BLDA: 7.47 PH UNITS (ref 7.35–7.45)
PH BLDA: 7.48 PH UNITS (ref 7.35–7.45)
PH BLDA: 7.5 PH UNITS (ref 7.35–7.45)
PH BLDA: 7.51 PH UNITS (ref 7.35–7.45)
PH UR STRIP.AUTO: 7 [PH] (ref 5–8)
PHOSPHATE SERPL-MCNC: 0.3 MG/DL (ref 2.5–4.5)
PHOSPHATE SERPL-MCNC: 1.8 MG/DL (ref 2.5–4.5)
PHOSPHATE SERPL-MCNC: 2 MG/DL (ref 2.5–4.5)
PHOSPHATE SERPL-MCNC: 2.1 MG/DL (ref 2.5–4.5)
PHOSPHATE SERPL-MCNC: 2.3 MG/DL (ref 2.5–4.5)
PHOSPHATE SERPL-MCNC: 2.4 MG/DL (ref 2.5–4.5)
PHOSPHATE SERPL-MCNC: 2.9 MG/DL (ref 2.5–4.5)
PHOSPHATE SERPL-MCNC: 3.9 MG/DL (ref 2.5–4.5)
PLAT MORPH BLD: NORMAL
PLATELET # BLD AUTO: 219 10*3/MM3 (ref 140–450)
PLATELET # BLD AUTO: 228 10*3/MM3 (ref 140–450)
PLATELET # BLD AUTO: 241 10*3/MM3 (ref 140–450)
PLATELET # BLD AUTO: 242 10*3/MM3 (ref 140–450)
PLATELET # BLD AUTO: 248 10*3/MM3 (ref 140–450)
PLATELET # BLD AUTO: 252 10*3/MM3 (ref 140–450)
PLATELET # BLD AUTO: 267 10*3/MM3 (ref 140–450)
PLATELET # BLD AUTO: 270 10*3/MM3 (ref 140–450)
PLATELET # BLD AUTO: 302 10*3/MM3 (ref 140–450)
PLATELET # BLD AUTO: 345 10*3/MM3 (ref 140–450)
PMV BLD AUTO: 10 FL (ref 6–12)
PMV BLD AUTO: 10.2 FL (ref 6–12)
PMV BLD AUTO: 10.4 FL (ref 6–12)
PMV BLD AUTO: 8.5 FL (ref 6–12)
PMV BLD AUTO: 8.5 FL (ref 6–12)
PMV BLD AUTO: 8.6 FL (ref 6–12)
PMV BLD AUTO: 8.8 FL (ref 6–12)
PMV BLD AUTO: 8.9 FL (ref 6–12)
PMV BLD AUTO: 9.8 FL (ref 6–12)
PMV BLD AUTO: 9.8 FL (ref 6–12)
PO2 BLD: 125 MM[HG] (ref 0–500)
PO2 BLD: 144 MM[HG] (ref 0–500)
PO2 BLD: 171 MM[HG] (ref 0–500)
PO2 BLD: 229 MM[HG] (ref 0–500)
PO2 BLD: 247 MM[HG] (ref 0–500)
PO2 BLD: 265 MM[HG] (ref 0–500)
PO2 BLD: 268 MM[HG] (ref 0–500)
PO2 BLD: 273 MM[HG] (ref 0–500)
PO2 BLDA: 109.1 MM HG (ref 80–100)
PO2 BLDA: 148.3 MM HG (ref 80–100)
PO2 BLDA: 228.6 MM HG (ref 80–100)
PO2 BLDA: 66.3 MM HG (ref 80–100)
PO2 BLDA: 67 MM HG (ref 80–100)
PO2 BLDA: 68 MM HG (ref 80–100)
PO2 BLDA: 71.8 MM HG (ref 80–100)
PO2 BLDA: 81 MM HG (ref 80–100)
PO2 BLDA: 85.5 MM HG (ref 80–100)
POIKILOCYTOSIS BLD QL SMEAR: ABNORMAL
POLYCHROMASIA BLD QL SMEAR: ABNORMAL
POTASSIUM SERPL-SCNC: 2.2 MMOL/L (ref 3.5–5.2)
POTASSIUM SERPL-SCNC: 2.3 MMOL/L (ref 3.5–5.2)
POTASSIUM SERPL-SCNC: 2.7 MMOL/L (ref 3.5–5.2)
POTASSIUM SERPL-SCNC: 3 MMOL/L (ref 3.5–5.2)
POTASSIUM SERPL-SCNC: 3.1 MMOL/L (ref 3.5–5.2)
POTASSIUM SERPL-SCNC: 3.3 MMOL/L (ref 3.5–5.2)
POTASSIUM SERPL-SCNC: 3.4 MMOL/L (ref 3.5–5.2)
POTASSIUM SERPL-SCNC: 3.5 MMOL/L (ref 3.5–5.2)
POTASSIUM SERPL-SCNC: 3.5 MMOL/L (ref 3.5–5.2)
POTASSIUM SERPL-SCNC: 3.6 MMOL/L (ref 3.5–5.2)
POTASSIUM SERPL-SCNC: 3.9 MMOL/L (ref 3.5–5.2)
POTASSIUM SERPL-SCNC: 4 MMOL/L (ref 3.5–5.2)
POTASSIUM SERPL-SCNC: 4.8 MMOL/L (ref 3.5–5.2)
POTASSIUM SERPL-SCNC: 5.3 MMOL/L (ref 3.5–5.2)
PROCALCITONIN SERPL-MCNC: 6.77 NG/ML (ref 0–0.25)
PROCALCITONIN SERPL-MCNC: 68.4 NG/ML (ref 0–0.25)
PROT ?TM UR-MCNC: 69.9 MG/DL
PROT CSF-MCNC: 47.9 MG/DL (ref 15–45)
PROT SERPL-MCNC: 6.3 G/DL (ref 6–8.5)
PROT SERPL-MCNC: 6.4 G/DL (ref 6–8.5)
PROT SERPL-MCNC: 6.4 G/DL (ref 6–8.5)
PROT SERPL-MCNC: 7.1 G/DL (ref 6–8.5)
PROT UR QL STRIP: ABNORMAL
PROT/CREAT UR: 3092.9 MG/G CREA (ref 0–200)
PSV: 10 CMH2O
QT INTERVAL: 302 MS
QT INTERVAL: 317 MS
QT INTERVAL: 318 MS
QT INTERVAL: 352 MS
QTC INTERVAL: 401 MS
QTC INTERVAL: 418 MS
QTC INTERVAL: 444 MS
QTC INTERVAL: 479 MS
RBC # BLD AUTO: 2.73 10*6/MM3 (ref 4.14–5.8)
RBC # BLD AUTO: 2.89 10*6/MM3 (ref 4.14–5.8)
RBC # BLD AUTO: 2.9 10*6/MM3 (ref 4.14–5.8)
RBC # BLD AUTO: 3.14 10*6/MM3 (ref 4.14–5.8)
RBC # BLD AUTO: 3.15 10*6/MM3 (ref 4.14–5.8)
RBC # BLD AUTO: 3.19 10*6/MM3 (ref 4.14–5.8)
RBC # BLD AUTO: 3.29 10*6/MM3 (ref 4.14–5.8)
RBC # BLD AUTO: 3.37 10*6/MM3 (ref 4.14–5.8)
RBC # BLD AUTO: 3.4 10*6/MM3 (ref 4.14–5.8)
RBC # BLD AUTO: 3.55 10*6/MM3 (ref 4.14–5.8)
RBC # CSF MANUAL: 6320 /MM3 (ref 0–0)
RBC # CSF MANUAL: 712 /MM3 (ref 0–0)
RBC # UR STRIP: ABNORMAL /HPF
RBC MORPH BLD: NORMAL
RBC MORPH BLD: NORMAL
READ BACK: ABNORMAL
READ BACK: YES
REAGIN AB CSF QL: NON REACTIVE
REF LAB TEST METHOD: ABNORMAL
RHINOVIRUS RNA SPEC NAA+PROBE: NOT DETECTED
RSV RNA NPH QL NAA+NON-PROBE: NOT DETECTED
S PNEUM AG SPEC QL LA: NEGATIVE
SAO2 % BLDCOA: 91 % (ref 92–98.5)
SAO2 % BLDCOA: 94.3 % (ref 92–98.5)
SAO2 % BLDCOA: 95 % (ref 92–98.5)
SAO2 % BLDCOA: 95.2 % (ref 92–98.5)
SAO2 % BLDCOA: 96.3 % (ref 92–98.5)
SAO2 % BLDCOA: 96.8 % (ref 92–98.5)
SAO2 % BLDCOA: 98.4 % (ref 92–98.5)
SAO2 % BLDCOA: 99.5 % (ref 92–98.5)
SAO2 % BLDCOA: 99.7 % (ref 92–98.5)
SARS-COV-2 RNA NPH QL NAA+NON-PROBE: NOT DETECTED
SET MECH RESP RATE: 12
SET MECH RESP RATE: 15
SET MECH RESP RATE: 18
SET MECH RESP RATE: 22
SET MECH RESP RATE: 29
SODIUM SERPL-SCNC: 132 MMOL/L (ref 136–145)
SODIUM SERPL-SCNC: 137 MMOL/L (ref 136–145)
SODIUM SERPL-SCNC: 140 MMOL/L (ref 136–145)
SODIUM SERPL-SCNC: 145 MMOL/L (ref 136–145)
SODIUM SERPL-SCNC: 147 MMOL/L (ref 136–145)
SODIUM SERPL-SCNC: 151 MMOL/L (ref 136–145)
SODIUM SERPL-SCNC: 152 MMOL/L (ref 136–145)
SODIUM SERPL-SCNC: 154 MMOL/L (ref 136–145)
SODIUM SERPL-SCNC: 154 MMOL/L (ref 136–145)
SODIUM SERPL-SCNC: 155 MMOL/L (ref 136–145)
SODIUM UR-SCNC: 93 MMOL/L
SP GR UR STRIP: 1.01 (ref 1–1.03)
SPECIMEN VOL CSF: 2 ML
SPECIMEN VOL CSF: 3 ML
SPHEROCYTES BLD QL SMEAR: ABNORMAL
SQUAMOUS #/AREA URNS HPF: ABNORMAL /HPF
TOTAL RATE: 18 BREATHS/MINUTE
TOTAL RATE: 21 BREATHS/MINUTE
TOTAL RATE: 22 BREATHS/MINUTE
TOTAL RATE: 25 BREATHS/MINUTE
TOTAL RATE: 28 BREATHS/MINUTE
TOTAL RATE: 29 BREATHS/MINUTE
TOTAL RATE: 40 BREATHS/MINUTE
TOTAL RATE: 43 BREATHS/MINUTE
TROPONIN T DELTA: -35 NG/L
TROPONIN T SERPL HS-MCNC: 85 NG/L
TSH SERPL DL<=0.05 MIU/L-ACNC: 3.15 UIU/ML (ref 0.27–4.2)
TUBE # CSF: 1
TUBE # CSF: 3
UROBILINOGEN UR QL STRIP: ABNORMAL
VANCOMYCIN SERPL-MCNC: 5.3 MCG/ML (ref 5–40)
VANCOMYCIN TROUGH SERPL-MCNC: 12.7 MCG/ML (ref 5–20)
VANCOMYCIN TROUGH SERPL-MCNC: 14.9 MCG/ML (ref 5–20)
VANCOMYCIN TROUGH SERPL-MCNC: 22.4 MCG/ML (ref 5–20)
VARIANT LYMPHS NFR BLD MANUAL: 4.1 % (ref 19.6–45.3)
VARIANT LYMPHS NFR BLD MANUAL: 5.1 % (ref 19.6–45.3)
VARIANT LYMPHS NFR BLD MANUAL: 6 % (ref 19.6–45.3)
VENTILATOR MODE: ABNORMAL
VENTILATOR MODE: NORMAL
VT ON VENT VENT: 500 ML
VT ON VENT VENT: 541 ML
VT ON VENT VENT: 589 ML
WBC # UR STRIP: ABNORMAL /HPF
WBC MORPH BLD: NORMAL
WBC NRBC COR # BLD AUTO: 10.58 10*3/MM3 (ref 3.4–10.8)
WBC NRBC COR # BLD AUTO: 11 10*3/MM3 (ref 3.4–10.8)
WBC NRBC COR # BLD AUTO: 11.34 10*3/MM3 (ref 3.4–10.8)
WBC NRBC COR # BLD AUTO: 12.56 10*3/MM3 (ref 3.4–10.8)
WBC NRBC COR # BLD AUTO: 13.5 10*3/MM3 (ref 3.4–10.8)
WBC NRBC COR # BLD AUTO: 13.94 10*3/MM3 (ref 3.4–10.8)
WBC NRBC COR # BLD AUTO: 14.72 10*3/MM3 (ref 3.4–10.8)
WBC NRBC COR # BLD AUTO: 14.91 10*3/MM3 (ref 3.4–10.8)
WBC NRBC COR # BLD AUTO: 15.38 10*3/MM3 (ref 3.4–10.8)
WBC NRBC COR # BLD AUTO: 6.51 10*3/MM3 (ref 3.4–10.8)
WNV IGG CSF QL: NEGATIVE
WNV IGM CSF QL: NEGATIVE

## 2024-01-01 PROCEDURE — 94799 UNLISTED PULMONARY SVC/PX: CPT

## 2024-01-01 PROCEDURE — 25010000002 ENOXAPARIN PER 10 MG: Performed by: INTERNAL MEDICINE

## 2024-01-01 PROCEDURE — 82948 REAGENT STRIP/BLOOD GLUCOSE: CPT

## 2024-01-01 PROCEDURE — 25010000002 FUROSEMIDE PER 20 MG: Performed by: INTERNAL MEDICINE

## 2024-01-01 PROCEDURE — 25010000002 ENOXAPARIN PER 10 MG: Performed by: HOSPITALIST

## 2024-01-01 PROCEDURE — 0 GADOBENATE DIMEGLUMINE 529 MG/ML SOLUTION: Performed by: INTERNAL MEDICINE

## 2024-01-01 PROCEDURE — 94664 DEMO&/EVAL PT USE INHALER: CPT

## 2024-01-01 PROCEDURE — 94761 N-INVAS EAR/PLS OXIMETRY MLT: CPT

## 2024-01-01 PROCEDURE — A9577 INJ MULTIHANCE: HCPCS | Performed by: INTERNAL MEDICINE

## 2024-01-01 PROCEDURE — 87147 CULTURE TYPE IMMUNOLOGIC: CPT

## 2024-01-01 PROCEDURE — 25010000002 CEFTRIAXONE PER 250 MG: Performed by: INTERNAL MEDICINE

## 2024-01-01 PROCEDURE — 87206 SMEAR FLUORESCENT/ACID STAI: CPT | Performed by: STUDENT IN AN ORGANIZED HEALTH CARE EDUCATION/TRAINING PROGRAM

## 2024-01-01 PROCEDURE — 85027 COMPLETE CBC AUTOMATED: CPT | Performed by: HOSPITALIST

## 2024-01-01 PROCEDURE — 25010000002 VANCOMYCIN 1 G RECONSTITUTED SOLUTION 1 EACH VIAL: Performed by: INTERNAL MEDICINE

## 2024-01-01 PROCEDURE — 84100 ASSAY OF PHOSPHORUS: CPT | Performed by: HOSPITALIST

## 2024-01-01 PROCEDURE — 25810000003 SODIUM CHLORIDE 0.9 % SOLUTION 250 ML FLEX CONT: Performed by: INTERNAL MEDICINE

## 2024-01-01 PROCEDURE — 85027 COMPLETE CBC AUTOMATED: CPT

## 2024-01-01 PROCEDURE — 25010000002 PROPOFOL 10 MG/ML EMULSION: Performed by: EMERGENCY MEDICINE

## 2024-01-01 PROCEDURE — 84132 ASSAY OF SERUM POTASSIUM: CPT | Performed by: INTERNAL MEDICINE

## 2024-01-01 PROCEDURE — 83605 ASSAY OF LACTIC ACID: CPT | Performed by: EMERGENCY MEDICINE

## 2024-01-01 PROCEDURE — 25810000003 SODIUM CHLORIDE 0.9 % SOLUTION: Performed by: INTERNAL MEDICINE

## 2024-01-01 PROCEDURE — 5A09457 ASSISTANCE WITH RESPIRATORY VENTILATION, 24-96 CONSECUTIVE HOURS, CONTINUOUS POSITIVE AIRWAY PRESSURE: ICD-10-PCS | Performed by: INTERNAL MEDICINE

## 2024-01-01 PROCEDURE — 99221 1ST HOSP IP/OBS SF/LOW 40: CPT | Performed by: STUDENT IN AN ORGANIZED HEALTH CARE EDUCATION/TRAINING PROGRAM

## 2024-01-01 PROCEDURE — 85025 COMPLETE CBC W/AUTO DIFF WBC: CPT | Performed by: HOSPITALIST

## 2024-01-01 PROCEDURE — 82803 BLOOD GASES ANY COMBINATION: CPT

## 2024-01-01 PROCEDURE — 86617 LYME DISEASE ANTIBODY: CPT | Performed by: STUDENT IN AN ORGANIZED HEALTH CARE EDUCATION/TRAINING PROGRAM

## 2024-01-01 PROCEDURE — 36415 COLL VENOUS BLD VENIPUNCTURE: CPT

## 2024-01-01 PROCEDURE — 84145 PROCALCITONIN (PCT): CPT | Performed by: EMERGENCY MEDICINE

## 2024-01-01 PROCEDURE — 31500 INSERT EMERGENCY AIRWAY: CPT

## 2024-01-01 PROCEDURE — 87496 CYTOMEG DNA AMP PROBE: CPT | Performed by: STUDENT IN AN ORGANIZED HEALTH CARE EDUCATION/TRAINING PROGRAM

## 2024-01-01 PROCEDURE — 82570 ASSAY OF URINE CREATININE: CPT | Performed by: INTERNAL MEDICINE

## 2024-01-01 PROCEDURE — 80048 BASIC METABOLIC PNL TOTAL CA: CPT | Performed by: INTERNAL MEDICINE

## 2024-01-01 PROCEDURE — 25010000002 VANCOMYCIN HCL 1.25 G RECONSTITUTED SOLUTION 1 EACH VIAL: Performed by: INTERNAL MEDICINE

## 2024-01-01 PROCEDURE — 94760 N-INVAS EAR/PLS OXIMETRY 1: CPT

## 2024-01-01 PROCEDURE — 25010000002 DESMOPRESSIN ACETATE PF 4 MCG/ML SOLUTION 1 ML VIAL: Performed by: INTERNAL MEDICINE

## 2024-01-01 PROCEDURE — 82803 BLOOD GASES ANY COMBINATION: CPT | Performed by: INTERNAL MEDICINE

## 2024-01-01 PROCEDURE — 84484 ASSAY OF TROPONIN QUANT: CPT | Performed by: EMERGENCY MEDICINE

## 2024-01-01 PROCEDURE — 74018 RADEX ABDOMEN 1 VIEW: CPT

## 2024-01-01 PROCEDURE — 84100 ASSAY OF PHOSPHORUS: CPT | Performed by: INTERNAL MEDICINE

## 2024-01-01 PROCEDURE — 82164 ANGIOTENSIN I ENZYME TEST: CPT | Performed by: STUDENT IN AN ORGANIZED HEALTH CARE EDUCATION/TRAINING PROGRAM

## 2024-01-01 PROCEDURE — 25810000003 LACTATED RINGERS SOLUTION: Performed by: EMERGENCY MEDICINE

## 2024-01-01 PROCEDURE — 94003 VENT MGMT INPAT SUBQ DAY: CPT

## 2024-01-01 PROCEDURE — 84156 ASSAY OF PROTEIN URINE: CPT | Performed by: INTERNAL MEDICINE

## 2024-01-01 PROCEDURE — 85007 BL SMEAR W/DIFF WBC COUNT: CPT

## 2024-01-01 PROCEDURE — 89050 BODY FLUID CELL COUNT: CPT | Performed by: STUDENT IN AN ORGANIZED HEALTH CARE EDUCATION/TRAINING PROGRAM

## 2024-01-01 PROCEDURE — 83735 ASSAY OF MAGNESIUM: CPT | Performed by: HOSPITALIST

## 2024-01-01 PROCEDURE — 25010000002 MEROPENEM PER 100 MG

## 2024-01-01 PROCEDURE — 71045 X-RAY EXAM CHEST 1 VIEW: CPT

## 2024-01-01 PROCEDURE — 80076 HEPATIC FUNCTION PANEL: CPT | Performed by: HOSPITALIST

## 2024-01-01 PROCEDURE — 93010 ELECTROCARDIOGRAM REPORT: CPT | Performed by: INTERNAL MEDICINE

## 2024-01-01 PROCEDURE — 87102 FUNGUS ISOLATION CULTURE: CPT | Performed by: INTERNAL MEDICINE

## 2024-01-01 PROCEDURE — 80053 COMPREHEN METABOLIC PANEL: CPT | Performed by: EMERGENCY MEDICINE

## 2024-01-01 PROCEDURE — 70553 MRI BRAIN STEM W/O & W/DYE: CPT

## 2024-01-01 PROCEDURE — 36600 WITHDRAWAL OF ARTERIAL BLOOD: CPT

## 2024-01-01 PROCEDURE — 83930 ASSAY OF BLOOD OSMOLALITY: CPT | Performed by: INTERNAL MEDICINE

## 2024-01-01 PROCEDURE — 80202 ASSAY OF VANCOMYCIN: CPT | Performed by: INTERNAL MEDICINE

## 2024-01-01 PROCEDURE — 0 DEXTROSE 5 % SOLUTION: Performed by: INTERNAL MEDICINE

## 2024-01-01 PROCEDURE — 84132 ASSAY OF SERUM POTASSIUM: CPT

## 2024-01-01 PROCEDURE — 80048 BASIC METABOLIC PNL TOTAL CA: CPT

## 2024-01-01 PROCEDURE — 82525 ASSAY OF COPPER: CPT | Performed by: STUDENT IN AN ORGANIZED HEALTH CARE EDUCATION/TRAINING PROGRAM

## 2024-01-01 PROCEDURE — 36600 WITHDRAWAL OF ARTERIAL BLOOD: CPT | Performed by: INTERNAL MEDICINE

## 2024-01-01 PROCEDURE — 99232 SBSQ HOSP IP/OBS MODERATE 35: CPT | Performed by: NURSE PRACTITIONER

## 2024-01-01 PROCEDURE — 84157 ASSAY OF PROTEIN OTHER: CPT | Performed by: STUDENT IN AN ORGANIZED HEALTH CARE EDUCATION/TRAINING PROGRAM

## 2024-01-01 PROCEDURE — 82565 ASSAY OF CREATININE: CPT | Performed by: INTERNAL MEDICINE

## 2024-01-01 PROCEDURE — 93005 ELECTROCARDIOGRAM TRACING: CPT

## 2024-01-01 PROCEDURE — 85007 BL SMEAR W/DIFF WBC COUNT: CPT | Performed by: EMERGENCY MEDICINE

## 2024-01-01 PROCEDURE — 80076 HEPATIC FUNCTION PANEL: CPT | Performed by: INTERNAL MEDICINE

## 2024-01-01 PROCEDURE — 92610 EVALUATE SWALLOWING FUNCTION: CPT

## 2024-01-01 PROCEDURE — 94660 CPAP INITIATION&MGMT: CPT

## 2024-01-01 PROCEDURE — 87116 MYCOBACTERIA CULTURE: CPT | Performed by: STUDENT IN AN ORGANIZED HEALTH CARE EDUCATION/TRAINING PROGRAM

## 2024-01-01 PROCEDURE — 25010000002 MEROPENEM 2 G RECONSTITUTED SOLUTION 1 EACH VIAL: Performed by: EMERGENCY MEDICINE

## 2024-01-01 PROCEDURE — 99223 1ST HOSP IP/OBS HIGH 75: CPT | Performed by: INTERNAL MEDICINE

## 2024-01-01 PROCEDURE — 25010000002 POTASSIUM CHLORIDE 10 MEQ/100ML SOLUTION: Performed by: INTERNAL MEDICINE

## 2024-01-01 PROCEDURE — 25010000002 VASOPRESSIN 20 UNIT/ML SOLUTION: Performed by: EMERGENCY MEDICINE

## 2024-01-01 PROCEDURE — 87205 SMEAR GRAM STAIN: CPT

## 2024-01-01 PROCEDURE — 93005 ELECTROCARDIOGRAM TRACING: CPT | Performed by: INTERNAL MEDICINE

## 2024-01-01 PROCEDURE — 25010000002 ALBUMIN HUMAN 25% PER 50 ML: Performed by: INTERNAL MEDICINE

## 2024-01-01 PROCEDURE — 82945 GLUCOSE OTHER FLUID: CPT | Performed by: STUDENT IN AN ORGANIZED HEALTH CARE EDUCATION/TRAINING PROGRAM

## 2024-01-01 PROCEDURE — 86789 WEST NILE VIRUS ANTIBODY: CPT | Performed by: STUDENT IN AN ORGANIZED HEALTH CARE EDUCATION/TRAINING PROGRAM

## 2024-01-01 PROCEDURE — 87327 CRYPTOCOCCUS NEOFORM AG IA: CPT | Performed by: STUDENT IN AN ORGANIZED HEALTH CARE EDUCATION/TRAINING PROGRAM

## 2024-01-01 PROCEDURE — 25010000002 LORAZEPAM PER 2 MG: Performed by: STUDENT IN AN ORGANIZED HEALTH CARE EDUCATION/TRAINING PROGRAM

## 2024-01-01 PROCEDURE — 25810000003 SODIUM CHLORIDE 0.9 % SOLUTION

## 2024-01-01 PROCEDURE — 87449 NOS EACH ORGANISM AG IA: CPT

## 2024-01-01 PROCEDURE — 99291 CRITICAL CARE FIRST HOUR: CPT

## 2024-01-01 PROCEDURE — 25010000002 FENTANYL CITRATE (PF) 50 MCG/ML SOLUTION: Performed by: INTERNAL MEDICINE

## 2024-01-01 PROCEDURE — 009U3ZX DRAINAGE OF SPINAL CANAL, PERCUTANEOUS APPROACH, DIAGNOSTIC: ICD-10-PCS | Performed by: STUDENT IN AN ORGANIZED HEALTH CARE EDUCATION/TRAINING PROGRAM

## 2024-01-01 PROCEDURE — 99232 SBSQ HOSP IP/OBS MODERATE 35: CPT

## 2024-01-01 PROCEDURE — 70450 CT HEAD/BRAIN W/O DYE: CPT

## 2024-01-01 PROCEDURE — 99232 SBSQ HOSP IP/OBS MODERATE 35: CPT | Performed by: INTERNAL MEDICINE

## 2024-01-01 PROCEDURE — 80053 COMPREHEN METABOLIC PANEL: CPT | Performed by: HOSPITALIST

## 2024-01-01 PROCEDURE — 83735 ASSAY OF MAGNESIUM: CPT | Performed by: INTERNAL MEDICINE

## 2024-01-01 PROCEDURE — 87070 CULTURE OTHR SPECIMN AEROBIC: CPT

## 2024-01-01 PROCEDURE — 25010000002 VANCOMYCIN 750 MG RECONSTITUTED SOLUTION 1 EACH VIAL: Performed by: INTERNAL MEDICINE

## 2024-01-01 PROCEDURE — 36600 WITHDRAWAL OF ARTERIAL BLOOD: CPT | Performed by: EMERGENCY MEDICINE

## 2024-01-01 PROCEDURE — 83690 ASSAY OF LIPASE: CPT | Performed by: EMERGENCY MEDICINE

## 2024-01-01 PROCEDURE — 86788 WEST NILE VIRUS AB IGM: CPT | Performed by: STUDENT IN AN ORGANIZED HEALTH CARE EDUCATION/TRAINING PROGRAM

## 2024-01-01 PROCEDURE — 93005 ELECTROCARDIOGRAM TRACING: CPT | Performed by: EMERGENCY MEDICINE

## 2024-01-01 PROCEDURE — 87641 MR-STAPH DNA AMP PROBE: CPT | Performed by: INTERNAL MEDICINE

## 2024-01-01 PROCEDURE — 95816 EEG AWAKE AND DROWSY: CPT | Performed by: STUDENT IN AN ORGANIZED HEALTH CARE EDUCATION/TRAINING PROGRAM

## 2024-01-01 PROCEDURE — 86592 SYPHILIS TEST NON-TREP QUAL: CPT | Performed by: STUDENT IN AN ORGANIZED HEALTH CARE EDUCATION/TRAINING PROGRAM

## 2024-01-01 PROCEDURE — 25810000003 SODIUM CHLORIDE 0.9 % SOLUTION: Performed by: HOSPITALIST

## 2024-01-01 PROCEDURE — 83935 ASSAY OF URINE OSMOLALITY: CPT | Performed by: INTERNAL MEDICINE

## 2024-01-01 PROCEDURE — 87040 BLOOD CULTURE FOR BACTERIA: CPT | Performed by: EMERGENCY MEDICINE

## 2024-01-01 PROCEDURE — 87086 URINE CULTURE/COLONY COUNT: CPT | Performed by: EMERGENCY MEDICINE

## 2024-01-01 PROCEDURE — 25810000003 SEPSIS FLUID NS 0.9 % SOLUTION: Performed by: INTERNAL MEDICINE

## 2024-01-01 PROCEDURE — 89051 BODY FLUID CELL COUNT: CPT | Performed by: STUDENT IN AN ORGANIZED HEALTH CARE EDUCATION/TRAINING PROGRAM

## 2024-01-01 PROCEDURE — 84300 ASSAY OF URINE SODIUM: CPT | Performed by: INTERNAL MEDICINE

## 2024-01-01 PROCEDURE — 84443 ASSAY THYROID STIM HORMONE: CPT | Performed by: HOSPITALIST

## 2024-01-01 PROCEDURE — 25810000003 LACTATED RINGERS PER 1000 ML

## 2024-01-01 PROCEDURE — 83735 ASSAY OF MAGNESIUM: CPT

## 2024-01-01 PROCEDURE — 85025 COMPLETE CBC W/AUTO DIFF WBC: CPT

## 2024-01-01 PROCEDURE — 25010000002 POTASSIUM CHLORIDE 10 MEQ/100ML SOLUTION

## 2024-01-01 PROCEDURE — 3E0G76Z INTRODUCTION OF NUTRITIONAL SUBSTANCE INTO UPPER GI, VIA NATURAL OR ARTIFICIAL OPENING: ICD-10-PCS | Performed by: INTERNAL MEDICINE

## 2024-01-01 PROCEDURE — 94002 VENT MGMT INPAT INIT DAY: CPT

## 2024-01-01 PROCEDURE — 25010000002 MORPHINE PER 10 MG: Performed by: STUDENT IN AN ORGANIZED HEALTH CARE EDUCATION/TRAINING PROGRAM

## 2024-01-01 PROCEDURE — P9612 CATHETERIZE FOR URINE SPEC: HCPCS

## 2024-01-01 PROCEDURE — 5A09357 ASSISTANCE WITH RESPIRATORY VENTILATION, LESS THAN 24 CONSECUTIVE HOURS, CONTINUOUS POSITIVE AIRWAY PRESSURE: ICD-10-PCS | Performed by: INTERNAL MEDICINE

## 2024-01-01 PROCEDURE — P9047 ALBUMIN (HUMAN), 25%, 50ML: HCPCS | Performed by: INTERNAL MEDICINE

## 2024-01-01 PROCEDURE — 0BH17EZ INSERTION OF ENDOTRACHEAL AIRWAY INTO TRACHEA, VIA NATURAL OR ARTIFICIAL OPENING: ICD-10-PCS | Performed by: EMERGENCY MEDICINE

## 2024-01-01 PROCEDURE — 0202U NFCT DS 22 TRGT SARS-COV-2: CPT | Performed by: EMERGENCY MEDICINE

## 2024-01-01 PROCEDURE — 94640 AIRWAY INHALATION TREATMENT: CPT

## 2024-01-01 PROCEDURE — 84145 PROCALCITONIN (PCT): CPT | Performed by: INTERNAL MEDICINE

## 2024-01-01 PROCEDURE — 81001 URINALYSIS AUTO W/SCOPE: CPT | Performed by: EMERGENCY MEDICINE

## 2024-01-01 PROCEDURE — 87186 SC STD MICRODIL/AGAR DIL: CPT

## 2024-01-01 PROCEDURE — 85025 COMPLETE CBC W/AUTO DIFF WBC: CPT | Performed by: EMERGENCY MEDICINE

## 2024-01-01 PROCEDURE — 5A1945Z RESPIRATORY VENTILATION, 24-96 CONSECUTIVE HOURS: ICD-10-PCS | Performed by: EMERGENCY MEDICINE

## 2024-01-01 PROCEDURE — 82803 BLOOD GASES ANY COMBINATION: CPT | Performed by: EMERGENCY MEDICINE

## 2024-01-01 PROCEDURE — 95816 EEG AWAKE AND DROWSY: CPT

## 2024-01-01 RX ORDER — LORAZEPAM 2 MG/ML
2 INJECTION INTRAMUSCULAR
Status: DISCONTINUED | OUTPATIENT
Start: 2024-01-01 | End: 2024-09-28 | Stop reason: HOSPADM

## 2024-01-01 RX ORDER — POTASSIUM CHLORIDE 1.5 G/1.58G
40 POWDER, FOR SOLUTION ORAL EVERY 4 HOURS
Status: COMPLETED | OUTPATIENT
Start: 2024-01-01 | End: 2024-01-01

## 2024-01-01 RX ORDER — MORPHINE SULFATE 2 MG/ML
4 INJECTION, SOLUTION INTRAMUSCULAR; INTRAVENOUS
Status: DISCONTINUED | OUTPATIENT
Start: 2024-01-01 | End: 2024-09-28 | Stop reason: HOSPADM

## 2024-01-01 RX ORDER — LORAZEPAM 2 MG/ML
1 INJECTION INTRAMUSCULAR
Status: DISCONTINUED | OUTPATIENT
Start: 2024-01-01 | End: 2024-09-28 | Stop reason: HOSPADM

## 2024-01-01 RX ORDER — GLYCOPYRROLATE 0.2 MG/ML
0.4 INJECTION INTRAMUSCULAR; INTRAVENOUS
Status: DISCONTINUED | OUTPATIENT
Start: 2024-01-01 | End: 2024-09-28 | Stop reason: HOSPADM

## 2024-01-01 RX ORDER — POTASSIUM CHLORIDE 7.45 MG/ML
10 INJECTION INTRAVENOUS
Status: COMPLETED | OUTPATIENT
Start: 2024-01-01 | End: 2024-01-01

## 2024-01-01 RX ORDER — FENTANYL/ROPIVACAINE/NS/PF 2-625MCG/1
15 PLASTIC BAG, INJECTION (ML) EPIDURAL
Status: COMPLETED | OUTPATIENT
Start: 2024-01-01 | End: 2024-01-01

## 2024-01-01 RX ORDER — MORPHINE SULFATE 10 MG/ML
6 INJECTION INTRAMUSCULAR; INTRAVENOUS; SUBCUTANEOUS
Status: DISCONTINUED | OUTPATIENT
Start: 2024-01-01 | End: 2024-09-28 | Stop reason: HOSPADM

## 2024-01-01 RX ORDER — LEVOTHYROXINE SODIUM 25 UG/1
25 TABLET ORAL
Status: DISCONTINUED | OUTPATIENT
Start: 2024-01-01 | End: 2024-01-01

## 2024-01-01 RX ORDER — BISACODYL 5 MG/1
5 TABLET, DELAYED RELEASE ORAL DAILY PRN
Status: DISCONTINUED | OUTPATIENT
Start: 2024-01-01 | End: 2024-01-01

## 2024-01-01 RX ORDER — PANTOPRAZOLE SODIUM 40 MG/10ML
40 INJECTION, POWDER, LYOPHILIZED, FOR SOLUTION INTRAVENOUS
Status: DISCONTINUED | OUTPATIENT
Start: 2024-01-01 | End: 2024-01-01

## 2024-01-01 RX ORDER — POLYETHYLENE GLYCOL 3350 17 G/17G
17 POWDER, FOR SOLUTION ORAL DAILY PRN
Status: DISCONTINUED | OUTPATIENT
Start: 2024-01-01 | End: 2024-01-01

## 2024-01-01 RX ORDER — FUROSEMIDE 10 MG/ML
20 INJECTION INTRAMUSCULAR; INTRAVENOUS EVERY 12 HOURS
Status: DISCONTINUED | OUTPATIENT
Start: 2024-01-01 | End: 2024-01-01

## 2024-01-01 RX ORDER — MORPHINE SULFATE 2 MG/ML
2 INJECTION, SOLUTION INTRAMUSCULAR; INTRAVENOUS
Status: DISCONTINUED | OUTPATIENT
Start: 2024-01-01 | End: 2024-09-28 | Stop reason: HOSPADM

## 2024-01-01 RX ORDER — ACETAMINOPHEN 650 MG/1
650 SUPPOSITORY RECTAL EVERY 4 HOURS PRN
Status: DISCONTINUED | OUTPATIENT
Start: 2024-01-01 | End: 2024-01-01

## 2024-01-01 RX ORDER — ACETAMINOPHEN 325 MG/1
650 TABLET ORAL EVERY 4 HOURS PRN
Status: DISCONTINUED | OUTPATIENT
Start: 2024-01-01 | End: 2024-09-28 | Stop reason: HOSPADM

## 2024-01-01 RX ORDER — DEXTROSE MONOHYDRATE 50 MG/ML
30 INJECTION, SOLUTION INTRAVENOUS CONTINUOUS
Status: DISCONTINUED | OUTPATIENT
Start: 2024-01-01 | End: 2024-01-01

## 2024-01-01 RX ORDER — NOREPINEPHRINE BITARTRATE 0.03 MG/ML
INJECTION, SOLUTION INTRAVENOUS
Status: COMPLETED
Start: 2024-01-01 | End: 2024-01-01

## 2024-01-01 RX ORDER — AMOXICILLIN 250 MG
2 CAPSULE ORAL 2 TIMES DAILY
Status: DISCONTINUED | OUTPATIENT
Start: 2024-01-01 | End: 2024-01-01

## 2024-01-01 RX ORDER — NOREPINEPHRINE BITARTRATE 0.03 MG/ML
.02-.3 INJECTION, SOLUTION INTRAVENOUS
Status: DISCONTINUED | OUTPATIENT
Start: 2024-01-01 | End: 2024-01-01

## 2024-01-01 RX ORDER — POTASSIUM CHLORIDE 750 MG/1
40 TABLET, FILM COATED, EXTENDED RELEASE ORAL EVERY 4 HOURS
Status: DISCONTINUED | OUTPATIENT
Start: 2024-01-01 | End: 2024-01-01

## 2024-01-01 RX ORDER — FENTANYL CITRATE 50 UG/ML
25 INJECTION, SOLUTION INTRAMUSCULAR; INTRAVENOUS ONCE
Status: COMPLETED | OUTPATIENT
Start: 2024-01-01 | End: 2024-01-01

## 2024-01-01 RX ORDER — FENTANYL/ROPIVACAINE/NS/PF 2-625MCG/1
15 PLASTIC BAG, INJECTION (ML) EPIDURAL ONCE
Status: COMPLETED | OUTPATIENT
Start: 2024-01-01 | End: 2024-01-01

## 2024-01-01 RX ORDER — HYDROMORPHONE HYDROCHLORIDE 1 MG/ML
0.5 INJECTION, SOLUTION INTRAMUSCULAR; INTRAVENOUS; SUBCUTANEOUS
Status: DISCONTINUED | OUTPATIENT
Start: 2024-01-01 | End: 2024-09-28 | Stop reason: HOSPADM

## 2024-01-01 RX ORDER — CHLORHEXIDINE GLUCONATE ORAL RINSE 1.2 MG/ML
15 SOLUTION DENTAL EVERY 12 HOURS SCHEDULED
Status: DISCONTINUED | OUTPATIENT
Start: 2024-01-01 | End: 2024-01-01

## 2024-01-01 RX ORDER — POTASSIUM CHLORIDE 7.45 MG/ML
10 INJECTION INTRAVENOUS
Status: DISPENSED | OUTPATIENT
Start: 2024-01-01 | End: 2024-01-01

## 2024-01-01 RX ORDER — GLYCOPYRROLATE 0.2 MG/ML
0.2 INJECTION INTRAMUSCULAR; INTRAVENOUS
Status: DISCONTINUED | OUTPATIENT
Start: 2024-01-01 | End: 2024-09-28 | Stop reason: HOSPADM

## 2024-01-01 RX ORDER — POTASSIUM BICARBONATE 782 MG/1
1 TABLET, EFFERVESCENT ORAL DAILY
COMMUNITY

## 2024-01-01 RX ORDER — MORPHINE SULFATE 20 MG/ML
10 SOLUTION ORAL
Status: DISCONTINUED | OUTPATIENT
Start: 2024-01-01 | End: 2024-09-28 | Stop reason: HOSPADM

## 2024-01-01 RX ORDER — NICOTINE POLACRILEX 4 MG
15 LOZENGE BUCCAL
Status: DISCONTINUED | OUTPATIENT
Start: 2024-01-01 | End: 2024-01-01

## 2024-01-01 RX ORDER — MORPHINE SULFATE 20 MG/ML
20 SOLUTION ORAL
Status: DISCONTINUED | OUTPATIENT
Start: 2024-01-01 | End: 2024-09-28 | Stop reason: HOSPADM

## 2024-01-01 RX ORDER — LORAZEPAM 2 MG/ML
2 INJECTION INTRAMUSCULAR ONCE
Status: COMPLETED | OUTPATIENT
Start: 2024-01-01 | End: 2024-01-01

## 2024-01-01 RX ORDER — KETOROLAC TROMETHAMINE 15 MG/ML
15 INJECTION, SOLUTION INTRAMUSCULAR; INTRAVENOUS EVERY 6 HOURS PRN
Status: DISCONTINUED | OUTPATIENT
Start: 2024-01-01 | End: 2024-09-28 | Stop reason: HOSPADM

## 2024-01-01 RX ORDER — DIPHENHYDRAMINE HYDROCHLORIDE 50 MG/ML
25 INJECTION INTRAMUSCULAR; INTRAVENOUS EVERY 6 HOURS PRN
Status: DISCONTINUED | OUTPATIENT
Start: 2024-01-01 | End: 2024-09-28 | Stop reason: HOSPADM

## 2024-01-01 RX ORDER — LORAZEPAM 2 MG/ML
0.5 CONCENTRATE ORAL
Status: DISCONTINUED | OUTPATIENT
Start: 2024-01-01 | End: 2024-09-28 | Stop reason: HOSPADM

## 2024-01-01 RX ORDER — ETOMIDATE 2 MG/ML
0.3 INJECTION INTRAVENOUS ONCE
Status: COMPLETED | OUTPATIENT
Start: 2024-01-01 | End: 2024-01-01

## 2024-01-01 RX ORDER — FENTANYL CITRATE 50 UG/ML
25 INJECTION, SOLUTION INTRAMUSCULAR; INTRAVENOUS EVERY 4 HOURS PRN
Status: DISCONTINUED | OUTPATIENT
Start: 2024-01-01 | End: 2024-01-01

## 2024-01-01 RX ORDER — SODIUM HYPOCHLORITE 1.25 MG/ML
SOLUTION TOPICAL 3 TIMES DAILY
COMMUNITY

## 2024-01-01 RX ORDER — SODIUM CHLORIDE, SODIUM LACTATE, POTASSIUM CHLORIDE, CALCIUM CHLORIDE 600; 310; 30; 20 MG/100ML; MG/100ML; MG/100ML; MG/100ML
125 INJECTION, SOLUTION INTRAVENOUS CONTINUOUS
Status: DISCONTINUED | OUTPATIENT
Start: 2024-01-01 | End: 2024-01-01

## 2024-01-01 RX ORDER — PROMETHAZINE HYDROCHLORIDE 12.5 MG/1
6.25 SUPPOSITORY RECTAL EVERY 4 HOURS PRN
Status: DISCONTINUED | OUTPATIENT
Start: 2024-01-01 | End: 2024-09-28 | Stop reason: HOSPADM

## 2024-01-01 RX ORDER — HONEY 100 %
1 PASTE (ML) TOPICAL DAILY
COMMUNITY

## 2024-01-01 RX ORDER — LORAZEPAM 2 MG/ML
0.5 INJECTION INTRAMUSCULAR
Status: DISCONTINUED | OUTPATIENT
Start: 2024-01-01 | End: 2024-09-28 | Stop reason: HOSPADM

## 2024-01-01 RX ORDER — PROMETHAZINE HYDROCHLORIDE 12.5 MG/1
6.25 TABLET ORAL EVERY 4 HOURS PRN
Status: DISCONTINUED | OUTPATIENT
Start: 2024-01-01 | End: 2024-09-28 | Stop reason: HOSPADM

## 2024-01-01 RX ORDER — BISACODYL 10 MG
10 SUPPOSITORY, RECTAL RECTAL DAILY PRN
Status: DISCONTINUED | OUTPATIENT
Start: 2024-01-01 | End: 2024-01-01

## 2024-01-01 RX ORDER — FENTANYL CITRATE 50 UG/ML
50 INJECTION, SOLUTION INTRAMUSCULAR; INTRAVENOUS
Status: DISCONTINUED | OUTPATIENT
Start: 2024-01-01 | End: 2024-01-01

## 2024-01-01 RX ORDER — DEXTROSE MONOHYDRATE 50 MG/ML
100 INJECTION, SOLUTION INTRAVENOUS EVERY 6 HOURS
Status: DISCONTINUED | OUTPATIENT
Start: 2024-01-01 | End: 2024-01-01

## 2024-01-01 RX ORDER — DEXMEDETOMIDINE HYDROCHLORIDE 4 UG/ML
.2-1.5 INJECTION, SOLUTION INTRAVENOUS
Status: DISCONTINUED | OUTPATIENT
Start: 2024-01-01 | End: 2024-01-01

## 2024-01-01 RX ORDER — ROCURONIUM BROMIDE 10 MG/ML
1 INJECTION, SOLUTION INTRAVENOUS ONCE
Status: COMPLETED | OUTPATIENT
Start: 2024-01-01 | End: 2024-01-01

## 2024-01-01 RX ORDER — LORAZEPAM 2 MG/ML
2 CONCENTRATE ORAL
Status: DISCONTINUED | OUTPATIENT
Start: 2024-01-01 | End: 2024-09-28 | Stop reason: HOSPADM

## 2024-01-01 RX ORDER — POTASSIUM CHLORIDE 1.5 G/1.58G
POWDER, FOR SOLUTION ORAL
Status: ACTIVE
Start: 2024-01-01 | End: 2024-01-01

## 2024-01-01 RX ORDER — IBUPROFEN 600 MG/1
1 TABLET ORAL
Status: DISCONTINUED | OUTPATIENT
Start: 2024-01-01 | End: 2024-01-01

## 2024-01-01 RX ORDER — GUAIFENESIN 600 MG/1
1200 TABLET, EXTENDED RELEASE ORAL EVERY 12 HOURS SCHEDULED
Status: DISCONTINUED | OUTPATIENT
Start: 2024-01-01 | End: 2024-01-01

## 2024-01-01 RX ORDER — ENOXAPARIN SODIUM 100 MG/ML
40 INJECTION SUBCUTANEOUS EVERY 24 HOURS
Status: DISCONTINUED | OUTPATIENT
Start: 2024-01-01 | End: 2024-01-01

## 2024-01-01 RX ORDER — FENTANYL CITRATE 50 UG/ML
100 INJECTION, SOLUTION INTRAMUSCULAR; INTRAVENOUS ONCE
Status: DISCONTINUED | OUTPATIENT
Start: 2024-01-01 | End: 2024-01-01

## 2024-01-01 RX ORDER — ACETAMINOPHEN 325 MG/1
650 TABLET ORAL EVERY 4 HOURS PRN
Status: DISCONTINUED | OUTPATIENT
Start: 2024-01-01 | End: 2024-01-01

## 2024-01-01 RX ORDER — MORPHINE SULFATE 20 MG/ML
5 SOLUTION ORAL
Status: DISCONTINUED | OUTPATIENT
Start: 2024-01-01 | End: 2024-09-28 | Stop reason: HOSPADM

## 2024-01-01 RX ORDER — SODIUM HYPOCHLORITE 1.25 MG/ML
SOLUTION TOPICAL EVERY 12 HOURS SCHEDULED
Status: DISCONTINUED | OUTPATIENT
Start: 2024-01-01 | End: 2024-01-01

## 2024-01-01 RX ORDER — SCOLOPAMINE TRANSDERMAL SYSTEM 1 MG/1
1 PATCH, EXTENDED RELEASE TRANSDERMAL
Status: DISCONTINUED | OUTPATIENT
Start: 2024-01-01 | End: 2024-09-28 | Stop reason: HOSPADM

## 2024-01-01 RX ORDER — GUAIFENESIN 200 MG/10ML
200 LIQUID ORAL EVERY 6 HOURS
Status: DISCONTINUED | OUTPATIENT
Start: 2024-01-01 | End: 2024-01-01

## 2024-01-01 RX ORDER — ATORVASTATIN CALCIUM 20 MG/1
40 TABLET, FILM COATED ORAL DAILY
Status: DISCONTINUED | OUTPATIENT
Start: 2024-01-01 | End: 2024-01-01

## 2024-01-01 RX ORDER — ALBUMIN (HUMAN) 12.5 G/50ML
25 SOLUTION INTRAVENOUS EVERY 6 HOURS
Status: COMPLETED | OUTPATIENT
Start: 2024-01-01 | End: 2024-01-01

## 2024-01-01 RX ORDER — DEXTROSE MONOHYDRATE 25 G/50ML
INJECTION, SOLUTION INTRAVENOUS
Status: COMPLETED
Start: 2024-01-01 | End: 2024-01-01

## 2024-01-01 RX ORDER — LORAZEPAM 2 MG/ML
1 CONCENTRATE ORAL
Status: DISCONTINUED | OUTPATIENT
Start: 2024-01-01 | End: 2024-09-28 | Stop reason: HOSPADM

## 2024-01-01 RX ORDER — SODIUM CHLORIDE 9 MG/ML
50 INJECTION, SOLUTION INTRAVENOUS CONTINUOUS
Status: DISCONTINUED | OUTPATIENT
Start: 2024-01-01 | End: 2024-01-01

## 2024-01-01 RX ORDER — ACETAMINOPHEN 650 MG/1
650 SUPPOSITORY RECTAL EVERY 4 HOURS PRN
Status: DISCONTINUED | OUTPATIENT
Start: 2024-01-01 | End: 2024-09-28 | Stop reason: HOSPADM

## 2024-01-01 RX ORDER — HYDROCODONE BITARTRATE AND ACETAMINOPHEN 5; 325 MG/1; MG/1
1 TABLET ORAL 3 TIMES DAILY PRN
COMMUNITY

## 2024-01-01 RX ORDER — DEXTROSE MONOHYDRATE 25 G/50ML
25 INJECTION, SOLUTION INTRAVENOUS
Status: DISCONTINUED | OUTPATIENT
Start: 2024-01-01 | End: 2024-01-01

## 2024-01-01 RX ORDER — NITROGLYCERIN 0.4 MG/1
0.4 TABLET SUBLINGUAL
Status: DISCONTINUED | OUTPATIENT
Start: 2024-01-01 | End: 2024-01-01

## 2024-01-01 RX ORDER — DIPHENOXYLATE HCL/ATROPINE 2.5-.025MG
1 TABLET ORAL
Status: DISCONTINUED | OUTPATIENT
Start: 2024-01-01 | End: 2024-09-28 | Stop reason: HOSPADM

## 2024-01-01 RX ORDER — DIPHENHYDRAMINE HCL 25 MG
25 CAPSULE ORAL EVERY 6 HOURS PRN
Status: DISCONTINUED | OUTPATIENT
Start: 2024-01-01 | End: 2024-09-28 | Stop reason: HOSPADM

## 2024-01-01 RX ORDER — ACETAMINOPHEN 160 MG/5ML
650 SOLUTION ORAL EVERY 4 HOURS PRN
Status: DISCONTINUED | OUTPATIENT
Start: 2024-01-01 | End: 2024-09-28 | Stop reason: HOSPADM

## 2024-01-01 RX ORDER — IPRATROPIUM BROMIDE AND ALBUTEROL SULFATE 2.5; .5 MG/3ML; MG/3ML
3 SOLUTION RESPIRATORY (INHALATION)
Status: DISCONTINUED | OUTPATIENT
Start: 2024-01-01 | End: 2024-01-01

## 2024-01-01 RX ORDER — FENTANYL CITRATE 50 UG/ML
25 INJECTION, SOLUTION INTRAMUSCULAR; INTRAVENOUS
Status: DISCONTINUED | OUTPATIENT
Start: 2024-01-01 | End: 2024-01-01

## 2024-01-01 RX ADMIN — LEVOTHYROXINE SODIUM 25 MCG: 25 TABLET ORAL at 05:38

## 2024-01-01 RX ADMIN — DEXTROSE MONOHYDRATE 25 G: 25 INJECTION, SOLUTION INTRAVENOUS at 18:49

## 2024-01-01 RX ADMIN — POTASSIUM CHLORIDE 40 MEQ: 1.5 FOR SOLUTION ORAL at 17:25

## 2024-01-01 RX ADMIN — DEXMEDETOMIDINE HYDROCHLORIDE 0.2 MCG/KG/HR: 4 INJECTION, SOLUTION INTRAVENOUS at 06:53

## 2024-01-01 RX ADMIN — PANTOPRAZOLE SODIUM 40 MG: 40 TABLET, DELAYED RELEASE ORAL at 06:42

## 2024-01-01 RX ADMIN — POTASSIUM CHLORIDE 40 MEQ: 1.5 FOR SOLUTION ORAL at 14:47

## 2024-01-01 RX ADMIN — IPRATROPIUM BROMIDE AND ALBUTEROL SULFATE 3 ML: 2.5; .5 SOLUTION RESPIRATORY (INHALATION) at 15:29

## 2024-01-01 RX ADMIN — SENNOSIDES AND DOCUSATE SODIUM 2 TABLET: 50; 8.6 TABLET ORAL at 20:41

## 2024-01-01 RX ADMIN — CHLORHEXIDINE GLUCONATE 15 ML: 1.2 RINSE ORAL at 09:06

## 2024-01-01 RX ADMIN — IPRATROPIUM BROMIDE AND ALBUTEROL SULFATE 3 ML: 2.5; .5 SOLUTION RESPIRATORY (INHALATION) at 19:37

## 2024-01-01 RX ADMIN — GUAIFENESIN 200 MG: 100 LIQUID ORAL at 19:58

## 2024-01-01 RX ADMIN — POTASSIUM CHLORIDE 10 MEQ: 7.46 INJECTION, SOLUTION INTRAVENOUS at 08:27

## 2024-01-01 RX ADMIN — SODIUM CHLORIDE 125 ML/HR: 9 INJECTION, SOLUTION INTRAVENOUS at 16:54

## 2024-01-01 RX ADMIN — GUAIFENESIN 200 MG: 100 LIQUID ORAL at 08:58

## 2024-01-01 RX ADMIN — POLYETHYLENE GLYCOL 3350 17 G: 17 POWDER, FOR SOLUTION ORAL at 13:16

## 2024-01-01 RX ADMIN — DAKIN'S SOLUTION 0.125% (QUARTER STRENGTH): 0.12 SOLUTION at 08:58

## 2024-01-01 RX ADMIN — POTASSIUM CHLORIDE 10 MEQ: 7.46 INJECTION, SOLUTION INTRAVENOUS at 13:08

## 2024-01-01 RX ADMIN — SODIUM CHLORIDE 125 ML/HR: 9 INJECTION, SOLUTION INTRAVENOUS at 08:43

## 2024-01-01 RX ADMIN — TAMSULOSIN HYDROCHLORIDE 0.4 MG: 0.4 CAPSULE ORAL at 20:23

## 2024-01-01 RX ADMIN — ACETAMINOPHEN 1000 MG: 500 TABLET ORAL at 20:23

## 2024-01-01 RX ADMIN — DEXMEDETOMIDINE HYDROCHLORIDE 0.2 MCG/KG/HR: 4 INJECTION, SOLUTION INTRAVENOUS at 11:48

## 2024-01-01 RX ADMIN — IPRATROPIUM BROMIDE AND ALBUTEROL SULFATE 3 ML: 2.5; .5 SOLUTION RESPIRATORY (INHALATION) at 15:14

## 2024-01-01 RX ADMIN — ENOXAPARIN SODIUM 40 MG: 100 INJECTION SUBCUTANEOUS at 19:37

## 2024-01-01 RX ADMIN — CHLORHEXIDINE GLUCONATE 15 ML: 1.2 RINSE ORAL at 08:36

## 2024-01-01 RX ADMIN — PANTOPRAZOLE SODIUM 40 MG: 40 INJECTION, POWDER, FOR SOLUTION INTRAVENOUS at 08:31

## 2024-01-01 RX ADMIN — DAKIN'S SOLUTION 0.125% (QUARTER STRENGTH): 0.12 SOLUTION at 11:52

## 2024-01-01 RX ADMIN — ATORVASTATIN CALCIUM 40 MG: 20 TABLET, FILM COATED ORAL at 09:10

## 2024-01-01 RX ADMIN — IPRATROPIUM BROMIDE AND ALBUTEROL SULFATE 3 ML: 2.5; .5 SOLUTION RESPIRATORY (INHALATION) at 15:02

## 2024-01-01 RX ADMIN — ALBUMIN (HUMAN) 25 G: 0.25 INJECTION, SOLUTION INTRAVENOUS at 15:12

## 2024-01-01 RX ADMIN — POTASSIUM PHOSPHATE, MONOBASIC POTASSIUM PHOSPHATE, DIBASIC 15 MMOL: 236; 224 INJECTION, SOLUTION INTRAVENOUS at 09:09

## 2024-01-01 RX ADMIN — CHLORHEXIDINE GLUCONATE 15 ML: 1.2 RINSE ORAL at 20:14

## 2024-01-01 RX ADMIN — IPRATROPIUM BROMIDE AND ALBUTEROL SULFATE 3 ML: 2.5; .5 SOLUTION RESPIRATORY (INHALATION) at 07:35

## 2024-01-01 RX ADMIN — GUAIFENESIN 200 MG: 100 LIQUID ORAL at 20:23

## 2024-01-01 RX ADMIN — IPRATROPIUM BROMIDE AND ALBUTEROL SULFATE 3 ML: 2.5; .5 SOLUTION RESPIRATORY (INHALATION) at 06:53

## 2024-01-01 RX ADMIN — GUAIFENESIN 200 MG: 100 LIQUID ORAL at 14:48

## 2024-01-01 RX ADMIN — IPRATROPIUM BROMIDE AND ALBUTEROL SULFATE 3 ML: 2.5; .5 SOLUTION RESPIRATORY (INHALATION) at 15:25

## 2024-01-01 RX ADMIN — PANTOPRAZOLE SODIUM 40 MG: 40 INJECTION, POWDER, FOR SOLUTION INTRAVENOUS at 08:56

## 2024-01-01 RX ADMIN — CHLORHEXIDINE GLUCONATE 15 ML: 1.2 RINSE ORAL at 20:45

## 2024-01-01 RX ADMIN — IPRATROPIUM BROMIDE AND ALBUTEROL SULFATE 3 ML: 2.5; .5 SOLUTION RESPIRATORY (INHALATION) at 14:52

## 2024-01-01 RX ADMIN — LEVOTHYROXINE SODIUM 25 MCG: 25 TABLET ORAL at 06:24

## 2024-01-01 RX ADMIN — GUAIFENESIN 200 MG: 100 LIQUID ORAL at 21:41

## 2024-01-01 RX ADMIN — SODIUM CHLORIDE 50 ML/HR: 9 INJECTION, SOLUTION INTRAVENOUS at 09:54

## 2024-01-01 RX ADMIN — DAKIN'S SOLUTION 0.125% (QUARTER STRENGTH): 0.12 SOLUTION at 08:33

## 2024-01-01 RX ADMIN — IPRATROPIUM BROMIDE AND ALBUTEROL SULFATE 3 ML: 2.5; .5 SOLUTION RESPIRATORY (INHALATION) at 19:53

## 2024-01-01 RX ADMIN — POTASSIUM CHLORIDE 40 MEQ: 1.5 FOR SOLUTION ORAL at 08:56

## 2024-01-01 RX ADMIN — DAKIN'S SOLUTION 0.125% (QUARTER STRENGTH): 0.12 SOLUTION at 21:17

## 2024-01-01 RX ADMIN — DEXTROSE MONOHYDRATE 25 G: 25 INJECTION, SOLUTION INTRAVENOUS at 21:19

## 2024-01-01 RX ADMIN — POTASSIUM CHLORIDE 40 MEQ: 1.5 FOR SOLUTION ORAL at 23:34

## 2024-01-01 RX ADMIN — VANCOMYCIN HYDROCHLORIDE 750 MG: 750 INJECTION, POWDER, LYOPHILIZED, FOR SOLUTION INTRAVENOUS at 15:56

## 2024-01-01 RX ADMIN — VANCOMYCIN HYDROCHLORIDE 750 MG: 750 INJECTION, POWDER, LYOPHILIZED, FOR SOLUTION INTRAVENOUS at 01:01

## 2024-01-01 RX ADMIN — GUAIFENESIN 200 MG: 100 LIQUID ORAL at 02:28

## 2024-01-01 RX ADMIN — SODIUM CHLORIDE 125 ML/HR: 9 INJECTION, SOLUTION INTRAVENOUS at 00:33

## 2024-01-01 RX ADMIN — POTASSIUM CHLORIDE 10 MEQ: 7.46 INJECTION, SOLUTION INTRAVENOUS at 11:55

## 2024-01-01 RX ADMIN — GUAIFENESIN 200 MG: 100 LIQUID ORAL at 20:13

## 2024-01-01 RX ADMIN — FENTANYL CITRATE 25 MCG: 50 INJECTION, SOLUTION INTRAMUSCULAR; INTRAVENOUS at 17:45

## 2024-01-01 RX ADMIN — IPRATROPIUM BROMIDE AND ALBUTEROL SULFATE 3 ML: 2.5; .5 SOLUTION RESPIRATORY (INHALATION) at 11:17

## 2024-01-01 RX ADMIN — POTASSIUM CHLORIDE 40 MEQ: 1.5 FOR SOLUTION ORAL at 22:19

## 2024-01-01 RX ADMIN — IPRATROPIUM BROMIDE AND ALBUTEROL SULFATE 3 ML: 2.5; .5 SOLUTION RESPIRATORY (INHALATION) at 08:03

## 2024-01-01 RX ADMIN — DAKIN'S SOLUTION 0.125% (QUARTER STRENGTH): 0.12 SOLUTION at 20:31

## 2024-01-01 RX ADMIN — GUAIFENESIN 200 MG: 100 LIQUID ORAL at 02:01

## 2024-01-01 RX ADMIN — IPRATROPIUM BROMIDE AND ALBUTEROL SULFATE 3 ML: 2.5; .5 SOLUTION RESPIRATORY (INHALATION) at 15:34

## 2024-01-01 RX ADMIN — GUAIFENESIN 200 MG: 100 LIQUID ORAL at 15:09

## 2024-01-01 RX ADMIN — POTASSIUM CHLORIDE 40 MEQ: 1.5 FOR SOLUTION ORAL at 22:39

## 2024-01-01 RX ADMIN — CHLORHEXIDINE GLUCONATE 15 ML: 1.2 RINSE ORAL at 08:32

## 2024-01-01 RX ADMIN — DAKIN'S SOLUTION 0.125% (QUARTER STRENGTH): 0.12 SOLUTION at 20:54

## 2024-01-01 RX ADMIN — LORAZEPAM 2 MG: 2 INJECTION INTRAMUSCULAR; INTRAVENOUS at 10:20

## 2024-01-01 RX ADMIN — ALBUMIN (HUMAN) 25 G: 0.25 INJECTION, SOLUTION INTRAVENOUS at 20:24

## 2024-01-01 RX ADMIN — DEXTROSE MONOHYDRATE 25 G: 25 INJECTION, SOLUTION INTRAVENOUS at 14:05

## 2024-01-01 RX ADMIN — CHLORHEXIDINE GLUCONATE 15 ML: 1.2 RINSE ORAL at 09:42

## 2024-01-01 RX ADMIN — CEFTRIAXONE 2000 MG: 2 INJECTION, POWDER, FOR SOLUTION INTRAMUSCULAR; INTRAVENOUS at 12:17

## 2024-01-01 RX ADMIN — DAKIN'S SOLUTION 0.125% (QUARTER STRENGTH): 0.12 SOLUTION at 00:30

## 2024-01-01 RX ADMIN — FENTANYL CITRATE 25 MCG: 50 INJECTION, SOLUTION INTRAMUSCULAR; INTRAVENOUS at 12:24

## 2024-01-01 RX ADMIN — MEROPENEM 1000 MG: 1 INJECTION, POWDER, FOR SOLUTION INTRAVENOUS at 14:43

## 2024-01-01 RX ADMIN — SODIUM CHLORIDE 2040 ML: 9 INJECTION, SOLUTION INTRAVENOUS at 14:47

## 2024-01-01 RX ADMIN — DEXTROSE MONOHYDRATE 25 G: 25 INJECTION, SOLUTION INTRAVENOUS at 06:22

## 2024-01-01 RX ADMIN — IPRATROPIUM BROMIDE AND ALBUTEROL SULFATE 3 ML: 2.5; .5 SOLUTION RESPIRATORY (INHALATION) at 07:19

## 2024-01-01 RX ADMIN — ENOXAPARIN SODIUM 40 MG: 100 INJECTION SUBCUTANEOUS at 16:34

## 2024-01-01 RX ADMIN — POTASSIUM CHLORIDE 10 MEQ: 7.46 INJECTION, SOLUTION INTRAVENOUS at 13:52

## 2024-01-01 RX ADMIN — IPRATROPIUM BROMIDE AND ALBUTEROL SULFATE 3 ML: 2.5; .5 SOLUTION RESPIRATORY (INHALATION) at 10:58

## 2024-01-01 RX ADMIN — DAKIN'S SOLUTION 0.125% (QUARTER STRENGTH): 0.12 SOLUTION at 22:08

## 2024-01-01 RX ADMIN — MEROPENEM 1000 MG: 1 INJECTION, POWDER, FOR SOLUTION INTRAVENOUS at 08:35

## 2024-01-01 RX ADMIN — FUROSEMIDE 20 MG: 10 INJECTION, SOLUTION INTRAMUSCULAR; INTRAVENOUS at 08:57

## 2024-01-01 RX ADMIN — CHLORHEXIDINE GLUCONATE 15 ML: 1.2 RINSE ORAL at 21:43

## 2024-01-01 RX ADMIN — SODIUM CHLORIDE 1000 MG: 900 INJECTION INTRAVENOUS at 11:32

## 2024-01-01 RX ADMIN — SODIUM CHLORIDE 50 ML/HR: 9 INJECTION, SOLUTION INTRAVENOUS at 06:27

## 2024-01-01 RX ADMIN — GUAIFENESIN 200 MG: 100 LIQUID ORAL at 20:31

## 2024-01-01 RX ADMIN — SODIUM CHLORIDE 1000 MG: 900 INJECTION INTRAVENOUS at 23:51

## 2024-01-01 RX ADMIN — SENNOSIDES AND DOCUSATE SODIUM 2 TABLET: 50; 8.6 TABLET ORAL at 09:00

## 2024-01-01 RX ADMIN — SODIUM CHLORIDE 1000 MG: 900 INJECTION INTRAVENOUS at 22:07

## 2024-01-01 RX ADMIN — SENNOSIDES AND DOCUSATE SODIUM 2 TABLET: 50; 8.6 TABLET ORAL at 08:35

## 2024-01-01 RX ADMIN — GUAIFENESIN 200 MG: 100 LIQUID ORAL at 01:00

## 2024-01-01 RX ADMIN — MEROPENEM 1000 MG: 1 INJECTION, POWDER, FOR SOLUTION INTRAVENOUS at 14:37

## 2024-01-01 RX ADMIN — PANTOPRAZOLE SODIUM 40 MG: 40 INJECTION, POWDER, FOR SOLUTION INTRAVENOUS at 09:10

## 2024-01-01 RX ADMIN — ENOXAPARIN SODIUM 40 MG: 100 INJECTION SUBCUTANEOUS at 15:09

## 2024-01-01 RX ADMIN — SENNOSIDES AND DOCUSATE SODIUM 2 TABLET: 50; 8.6 TABLET ORAL at 08:34

## 2024-01-01 RX ADMIN — BISACODYL 5 MG: 5 TABLET, COATED ORAL at 13:16

## 2024-01-01 RX ADMIN — GUAIFENESIN 200 MG: 100 LIQUID ORAL at 08:10

## 2024-01-01 RX ADMIN — CEFTRIAXONE 2000 MG: 2 INJECTION, POWDER, FOR SOLUTION INTRAMUSCULAR; INTRAVENOUS at 15:03

## 2024-01-01 RX ADMIN — DAKIN'S SOLUTION 0.125% (QUARTER STRENGTH): 0.12 SOLUTION at 09:44

## 2024-01-01 RX ADMIN — DAKIN'S SOLUTION 0.125% (QUARTER STRENGTH): 0.12 SOLUTION at 20:39

## 2024-01-01 RX ADMIN — POTASSIUM CHLORIDE 40 MEQ: 1.5 FOR SOLUTION ORAL at 13:53

## 2024-01-01 RX ADMIN — VASOPRESSIN 0.03 UNITS/MIN: 20 INJECTION, SOLUTION INTRAVENOUS at 23:55

## 2024-01-01 RX ADMIN — IPRATROPIUM BROMIDE AND ALBUTEROL SULFATE 3 ML: 2.5; .5 SOLUTION RESPIRATORY (INHALATION) at 07:29

## 2024-01-01 RX ADMIN — POTASSIUM CHLORIDE 10 MEQ: 7.46 INJECTION, SOLUTION INTRAVENOUS at 08:38

## 2024-01-01 RX ADMIN — IPRATROPIUM BROMIDE AND ALBUTEROL SULFATE 3 ML: 2.5; .5 SOLUTION RESPIRATORY (INHALATION) at 06:40

## 2024-01-01 RX ADMIN — PANTOPRAZOLE SODIUM 40 MG: 40 INJECTION, POWDER, FOR SOLUTION INTRAVENOUS at 09:42

## 2024-01-01 RX ADMIN — LIDOCAINE 2 PATCH: 4 PATCH TOPICAL at 08:39

## 2024-01-01 RX ADMIN — IPRATROPIUM BROMIDE AND ALBUTEROL SULFATE 3 ML: 2.5; .5 SOLUTION RESPIRATORY (INHALATION) at 07:22

## 2024-01-01 RX ADMIN — CHLORHEXIDINE GLUCONATE 15 ML: 1.2 RINSE ORAL at 20:31

## 2024-01-01 RX ADMIN — ACETAMINOPHEN 1000 MG: 500 TABLET ORAL at 16:00

## 2024-01-01 RX ADMIN — DAKIN'S SOLUTION 0.125% (QUARTER STRENGTH): 0.12 SOLUTION at 18:44

## 2024-01-01 RX ADMIN — ENOXAPARIN SODIUM 40 MG: 100 INJECTION SUBCUTANEOUS at 16:12

## 2024-01-01 RX ADMIN — GUAIFENESIN 200 MG: 100 LIQUID ORAL at 20:41

## 2024-01-01 RX ADMIN — MEROPENEM 1000 MG: 1 INJECTION, POWDER, FOR SOLUTION INTRAVENOUS at 05:43

## 2024-01-01 RX ADMIN — GUAIFENESIN 200 MG: 100 LIQUID ORAL at 01:36

## 2024-01-01 RX ADMIN — DEXTROSE MONOHYDRATE 25 G: 25 INJECTION, SOLUTION INTRAVENOUS at 13:47

## 2024-01-01 RX ADMIN — LEVOTHYROXINE SODIUM 25 MCG: 25 TABLET ORAL at 05:39

## 2024-01-01 RX ADMIN — POTASSIUM PHOSPHATE, MONOBASIC AND POTASSIUM PHOSPHATE, DIBASIC 15 MMOL: 224; 236 INJECTION, SOLUTION, CONCENTRATE INTRAVENOUS at 20:34

## 2024-01-01 RX ADMIN — LEVOTHYROXINE SODIUM 25 MCG: 25 TABLET ORAL at 06:54

## 2024-01-01 RX ADMIN — IPRATROPIUM BROMIDE AND ALBUTEROL SULFATE 3 ML: 2.5; .5 SOLUTION RESPIRATORY (INHALATION) at 10:42

## 2024-01-01 RX ADMIN — IPRATROPIUM BROMIDE AND ALBUTEROL SULFATE 3 ML: 2.5; .5 SOLUTION RESPIRATORY (INHALATION) at 17:25

## 2024-01-01 RX ADMIN — Medication 0.08 MCG/KG/MIN: at 19:58

## 2024-01-01 RX ADMIN — GUAIFENESIN 200 MG: 100 LIQUID ORAL at 20:30

## 2024-01-01 RX ADMIN — FENTANYL CITRATE 50 MCG: 50 INJECTION, SOLUTION INTRAMUSCULAR; INTRAVENOUS at 10:39

## 2024-01-01 RX ADMIN — CHLORHEXIDINE GLUCONATE 15 ML: 1.2 RINSE ORAL at 08:18

## 2024-01-01 RX ADMIN — GUAIFENESIN 200 MG: 100 LIQUID ORAL at 08:24

## 2024-01-01 RX ADMIN — DEXTROSE MONOHYDRATE 25 G: 25 INJECTION, SOLUTION INTRAVENOUS at 00:12

## 2024-01-01 RX ADMIN — GUAIFENESIN 200 MG: 100 LIQUID ORAL at 02:25

## 2024-01-01 RX ADMIN — DAKIN'S SOLUTION 0.125% (QUARTER STRENGTH): 0.12 SOLUTION at 08:35

## 2024-01-01 RX ADMIN — DEXTROSE MONOHYDRATE 25 G: 25 INJECTION, SOLUTION INTRAVENOUS at 17:52

## 2024-01-01 RX ADMIN — DESMOPRESSIN ACETATE 1 MCG: 4 INJECTION, SOLUTION INTRAVENOUS; SUBCUTANEOUS at 20:37

## 2024-01-01 RX ADMIN — ACETAMINOPHEN 325MG 650 MG: 325 TABLET ORAL at 05:07

## 2024-01-01 RX ADMIN — IPRATROPIUM BROMIDE AND ALBUTEROL SULFATE 3 ML: 2.5; .5 SOLUTION RESPIRATORY (INHALATION) at 11:11

## 2024-01-01 RX ADMIN — DEXTROSE MONOHYDRATE 25 G: 25 INJECTION, SOLUTION INTRAVENOUS at 15:51

## 2024-01-01 RX ADMIN — LEVOTHYROXINE SODIUM 25 MCG: 25 TABLET ORAL at 05:36

## 2024-01-01 RX ADMIN — DEXTROSE MONOHYDRATE 25 G: 25 INJECTION, SOLUTION INTRAVENOUS at 12:12

## 2024-01-01 RX ADMIN — POTASSIUM PHOSPHATE, MONOBASIC AND POTASSIUM PHOSPHATE, DIBASIC 15 MMOL: 224; 236 INJECTION, SOLUTION, CONCENTRATE INTRAVENOUS at 15:03

## 2024-01-01 RX ADMIN — FENTANYL CITRATE 50 MCG: 50 INJECTION, SOLUTION INTRAMUSCULAR; INTRAVENOUS at 08:34

## 2024-01-01 RX ADMIN — GUAIFENESIN 200 MG: 100 LIQUID ORAL at 14:39

## 2024-01-01 RX ADMIN — SODIUM CHLORIDE, POTASSIUM CHLORIDE, SODIUM LACTATE AND CALCIUM CHLORIDE 125 ML/HR: 600; 310; 30; 20 INJECTION, SOLUTION INTRAVENOUS at 02:45

## 2024-01-01 RX ADMIN — FENTANYL CITRATE 25 MCG: 50 INJECTION, SOLUTION INTRAMUSCULAR; INTRAVENOUS at 08:09

## 2024-01-01 RX ADMIN — Medication 0.2 MCG/KG/MIN: at 18:43

## 2024-01-01 RX ADMIN — SODIUM CHLORIDE 1000 MG: 900 INJECTION INTRAVENOUS at 22:31

## 2024-01-01 RX ADMIN — PANTOPRAZOLE SODIUM 40 MG: 40 INJECTION, POWDER, FOR SOLUTION INTRAVENOUS at 08:36

## 2024-01-01 RX ADMIN — FENTANYL CITRATE 50 MCG: 50 INJECTION, SOLUTION INTRAMUSCULAR; INTRAVENOUS at 15:17

## 2024-01-01 RX ADMIN — CEFTRIAXONE 2000 MG: 2 INJECTION, POWDER, FOR SOLUTION INTRAMUSCULAR; INTRAVENOUS at 14:35

## 2024-01-01 RX ADMIN — SODIUM CHLORIDE 1000 MG: 900 INJECTION INTRAVENOUS at 10:59

## 2024-01-01 RX ADMIN — SODIUM CHLORIDE 1000 MG: 900 INJECTION INTRAVENOUS at 10:38

## 2024-01-01 RX ADMIN — IPRATROPIUM BROMIDE AND ALBUTEROL SULFATE 3 ML: 2.5; .5 SOLUTION RESPIRATORY (INHALATION) at 13:15

## 2024-01-01 RX ADMIN — ATORVASTATIN CALCIUM 40 MG: 20 TABLET, FILM COATED ORAL at 08:58

## 2024-01-01 RX ADMIN — SODIUM CHLORIDE 1000 MG: 900 INJECTION INTRAVENOUS at 11:46

## 2024-01-01 RX ADMIN — ACETAMINOPHEN 325MG 650 MG: 325 TABLET ORAL at 20:59

## 2024-01-01 RX ADMIN — FENTANYL CITRATE 25 MCG: 50 INJECTION, SOLUTION INTRAMUSCULAR; INTRAVENOUS at 15:42

## 2024-01-01 RX ADMIN — ATORVASTATIN CALCIUM 40 MG: 20 TABLET, FILM COATED ORAL at 08:35

## 2024-01-01 RX ADMIN — IPRATROPIUM BROMIDE AND ALBUTEROL SULFATE 3 ML: 2.5; .5 SOLUTION RESPIRATORY (INHALATION) at 15:05

## 2024-01-01 RX ADMIN — PANTOPRAZOLE SODIUM 40 MG: 40 INJECTION, POWDER, FOR SOLUTION INTRAVENOUS at 08:58

## 2024-01-01 RX ADMIN — ETOMIDATE 20.4 MG: 2 INJECTION, SOLUTION INTRAVENOUS at 00:30

## 2024-01-01 RX ADMIN — SENNOSIDES AND DOCUSATE SODIUM 2 TABLET: 50; 8.6 TABLET ORAL at 20:59

## 2024-01-01 RX ADMIN — DAKIN'S SOLUTION 0.125% (QUARTER STRENGTH): 0.12 SOLUTION at 08:57

## 2024-01-01 RX ADMIN — IPRATROPIUM BROMIDE AND ALBUTEROL SULFATE 3 ML: 2.5; .5 SOLUTION RESPIRATORY (INHALATION) at 16:06

## 2024-01-01 RX ADMIN — SODIUM CHLORIDE 1000 MG: 900 INJECTION INTRAVENOUS at 11:55

## 2024-01-01 RX ADMIN — LEVOTHYROXINE SODIUM 25 MCG: 25 TABLET ORAL at 05:10

## 2024-01-01 RX ADMIN — IPRATROPIUM BROMIDE AND ALBUTEROL SULFATE 3 ML: 2.5; .5 SOLUTION RESPIRATORY (INHALATION) at 07:05

## 2024-01-01 RX ADMIN — FENTANYL CITRATE 50 MCG: 50 INJECTION, SOLUTION INTRAMUSCULAR; INTRAVENOUS at 10:16

## 2024-01-01 RX ADMIN — ACETAMINOPHEN 325MG 650 MG: 325 TABLET ORAL at 03:03

## 2024-01-01 RX ADMIN — DEXTROSE MONOHYDRATE 25 G: 25 INJECTION, SOLUTION INTRAVENOUS at 00:15

## 2024-01-01 RX ADMIN — SENNOSIDES AND DOCUSATE SODIUM 2 TABLET: 50; 8.6 TABLET ORAL at 08:31

## 2024-01-01 RX ADMIN — ENOXAPARIN SODIUM 40 MG: 100 INJECTION SUBCUTANEOUS at 16:59

## 2024-01-01 RX ADMIN — PROPOFOL INJECTABLE EMULSION 5 MCG/KG/MIN: 10 INJECTION, EMULSION INTRAVENOUS at 10:37

## 2024-01-01 RX ADMIN — GUAIFENESIN 200 MG: 100 LIQUID ORAL at 09:00

## 2024-01-01 RX ADMIN — ATORVASTATIN CALCIUM 40 MG: 20 TABLET, FILM COATED ORAL at 08:31

## 2024-01-01 RX ADMIN — GUAIFENESIN 200 MG: 100 LIQUID ORAL at 08:30

## 2024-01-01 RX ADMIN — GUAIFENESIN 200 MG: 100 LIQUID ORAL at 03:39

## 2024-01-01 RX ADMIN — SODIUM CHLORIDE 125 ML/HR: 9 INJECTION, SOLUTION INTRAVENOUS at 23:00

## 2024-01-01 RX ADMIN — ATORVASTATIN CALCIUM 40 MG: 20 TABLET, FILM COATED ORAL at 09:43

## 2024-01-01 RX ADMIN — LEVOTHYROXINE SODIUM 25 MCG: 25 TABLET ORAL at 05:58

## 2024-01-01 RX ADMIN — IPRATROPIUM BROMIDE AND ALBUTEROL SULFATE 3 ML: 2.5; .5 SOLUTION RESPIRATORY (INHALATION) at 10:47

## 2024-01-01 RX ADMIN — PANTOPRAZOLE SODIUM 40 MG: 40 INJECTION, POWDER, FOR SOLUTION INTRAVENOUS at 08:24

## 2024-01-01 RX ADMIN — ATORVASTATIN CALCIUM 40 MG: 20 TABLET, FILM COATED ORAL at 08:57

## 2024-01-01 RX ADMIN — SODIUM CHLORIDE 1000 MG: 900 INJECTION INTRAVENOUS at 09:54

## 2024-01-01 RX ADMIN — GUAIFENESIN 200 MG: 100 LIQUID ORAL at 20:36

## 2024-01-01 RX ADMIN — ACETAMINOPHEN 1000 MG: 500 TABLET ORAL at 08:38

## 2024-01-01 RX ADMIN — CHLORHEXIDINE GLUCONATE 15 ML: 1.2 RINSE ORAL at 20:28

## 2024-01-01 RX ADMIN — POTASSIUM PHOSPHATE, MONOBASIC AND POTASSIUM PHOSPHATE, DIBASIC 15 MMOL: 224; 236 INJECTION, SOLUTION, CONCENTRATE INTRAVENOUS at 18:42

## 2024-01-01 RX ADMIN — SENNOSIDES AND DOCUSATE SODIUM 2 TABLET: 50; 8.6 TABLET ORAL at 09:10

## 2024-01-01 RX ADMIN — SODIUM CHLORIDE 125 ML/HR: 9 INJECTION, SOLUTION INTRAVENOUS at 15:56

## 2024-01-01 RX ADMIN — DAKIN'S SOLUTION 0.125% (QUARTER STRENGTH): 0.12 SOLUTION at 20:02

## 2024-01-01 RX ADMIN — SODIUM CHLORIDE 1000 MG: 900 INJECTION INTRAVENOUS at 22:02

## 2024-01-01 RX ADMIN — IPRATROPIUM BROMIDE AND ALBUTEROL SULFATE 3 ML: 2.5; .5 SOLUTION RESPIRATORY (INHALATION) at 07:11

## 2024-01-01 RX ADMIN — PROPOFOL INJECTABLE EMULSION 10 MCG/KG/MIN: 10 INJECTION, EMULSION INTRAVENOUS at 14:51

## 2024-01-01 RX ADMIN — POTASSIUM, SODIUM PHOSPHATES 280 MG-160 MG-250 MG ORAL POWDER PACKET 2 PACKET: POWDER IN PACKET at 08:56

## 2024-01-01 RX ADMIN — ENOXAPARIN SODIUM 40 MG: 100 INJECTION SUBCUTANEOUS at 15:56

## 2024-01-01 RX ADMIN — POTASSIUM CHLORIDE 10 MEQ: 7.46 INJECTION, SOLUTION INTRAVENOUS at 10:27

## 2024-01-01 RX ADMIN — GUAIFENESIN 200 MG: 100 LIQUID ORAL at 08:36

## 2024-01-01 RX ADMIN — CEFTRIAXONE 2000 MG: 2 INJECTION, POWDER, FOR SOLUTION INTRAMUSCULAR; INTRAVENOUS at 13:16

## 2024-01-01 RX ADMIN — ALBUMIN (HUMAN) 25 G: 0.25 INJECTION, SOLUTION INTRAVENOUS at 08:34

## 2024-01-01 RX ADMIN — Medication 0.3 MCG/KG/MIN: at 01:02

## 2024-01-01 RX ADMIN — IPRATROPIUM BROMIDE AND ALBUTEROL SULFATE 3 ML: 2.5; .5 SOLUTION RESPIRATORY (INHALATION) at 19:26

## 2024-01-01 RX ADMIN — Medication 0.3 MCG/KG/MIN: at 16:12

## 2024-01-01 RX ADMIN — SODIUM CHLORIDE, POTASSIUM CHLORIDE, SODIUM LACTATE AND CALCIUM CHLORIDE 2040 ML: 600; 310; 30; 20 INJECTION, SOLUTION INTRAVENOUS at 23:10

## 2024-01-01 RX ADMIN — CHLORHEXIDINE GLUCONATE 15 ML: 1.2 RINSE ORAL at 20:01

## 2024-01-01 RX ADMIN — ATORVASTATIN CALCIUM 40 MG: 20 TABLET, FILM COATED ORAL at 08:42

## 2024-01-01 RX ADMIN — LEVOTHYROXINE SODIUM 25 MCG: 25 TABLET ORAL at 05:33

## 2024-01-01 RX ADMIN — VASOPRESSIN 0.03 UNITS/MIN: 20 INJECTION, SOLUTION INTRAVENOUS at 22:24

## 2024-01-01 RX ADMIN — POTASSIUM CHLORIDE 10 MEQ: 7.46 INJECTION, SOLUTION INTRAVENOUS at 09:42

## 2024-01-01 RX ADMIN — SODIUM CHLORIDE 1000 MG: 900 INJECTION INTRAVENOUS at 10:22

## 2024-01-01 RX ADMIN — IPRATROPIUM BROMIDE AND ALBUTEROL SULFATE 3 ML: 2.5; .5 SOLUTION RESPIRATORY (INHALATION) at 11:05

## 2024-01-01 RX ADMIN — POTASSIUM CHLORIDE 40 MEQ: 1.5 FOR SOLUTION ORAL at 01:36

## 2024-01-01 RX ADMIN — GADOBENATE DIMEGLUMINE 17 ML: 529 INJECTION, SOLUTION INTRAVENOUS at 23:12

## 2024-01-01 RX ADMIN — DAKIN'S SOLUTION 0.125% (QUARTER STRENGTH): 0.12 SOLUTION at 08:17

## 2024-01-01 RX ADMIN — ATORVASTATIN CALCIUM 40 MG: 20 TABLET, FILM COATED ORAL at 08:28

## 2024-01-01 RX ADMIN — IPRATROPIUM BROMIDE AND ALBUTEROL SULFATE 3 ML: 2.5; .5 SOLUTION RESPIRATORY (INHALATION) at 19:29

## 2024-01-01 RX ADMIN — POTASSIUM PHOSPHATES 15 MMOL: 236; 224 INJECTION, SOLUTION INTRAVENOUS at 09:11

## 2024-01-01 RX ADMIN — ATORVASTATIN CALCIUM 40 MG: 20 TABLET, FILM COATED ORAL at 08:34

## 2024-01-01 RX ADMIN — SENNOSIDES AND DOCUSATE SODIUM 2 TABLET: 50; 8.6 TABLET ORAL at 09:43

## 2024-01-01 RX ADMIN — POTASSIUM CHLORIDE 40 MEQ: 1.5 FOR SOLUTION ORAL at 20:36

## 2024-01-01 RX ADMIN — SENNOSIDES AND DOCUSATE SODIUM 2 TABLET: 50; 8.6 TABLET ORAL at 20:23

## 2024-01-01 RX ADMIN — GUAIFENESIN 200 MG: 100 LIQUID ORAL at 13:53

## 2024-01-01 RX ADMIN — ACETAMINOPHEN 325MG 650 MG: 325 TABLET ORAL at 05:41

## 2024-01-01 RX ADMIN — CEFTRIAXONE 2000 MG: 2 INJECTION, POWDER, FOR SOLUTION INTRAMUSCULAR; INTRAVENOUS at 12:15

## 2024-01-01 RX ADMIN — IPRATROPIUM BROMIDE AND ALBUTEROL SULFATE 3 ML: 2.5; .5 SOLUTION RESPIRATORY (INHALATION) at 19:28

## 2024-01-01 RX ADMIN — Medication 0.1 MCG/KG/MIN: at 23:05

## 2024-01-01 RX ADMIN — CHLORHEXIDINE GLUCONATE 15 ML: 1.2 RINSE ORAL at 08:42

## 2024-01-01 RX ADMIN — POTASSIUM CHLORIDE 40 MEQ: 1.5 FOR SOLUTION ORAL at 12:17

## 2024-01-01 RX ADMIN — DAKIN'S SOLUTION 0.125% (QUARTER STRENGTH): 0.12 SOLUTION at 09:10

## 2024-01-01 RX ADMIN — FUROSEMIDE 20 MG: 10 INJECTION, SOLUTION INTRAMUSCULAR; INTRAVENOUS at 09:11

## 2024-01-01 RX ADMIN — Medication 0.3 MCG/KG/MIN: at 05:30

## 2024-01-01 RX ADMIN — LEVOTHYROXINE SODIUM 25 MCG: 25 TABLET ORAL at 08:36

## 2024-01-01 RX ADMIN — DEXTROSE MONOHYDRATE 100 ML: 50 INJECTION, SOLUTION INTRAVENOUS at 09:26

## 2024-01-01 RX ADMIN — SODIUM CHLORIDE 125 ML/HR: 9 INJECTION, SOLUTION INTRAVENOUS at 00:29

## 2024-01-01 RX ADMIN — FENTANYL CITRATE 25 MCG: 50 INJECTION, SOLUTION INTRAMUSCULAR; INTRAVENOUS at 00:52

## 2024-01-01 RX ADMIN — FENTANYL CITRATE 50 MCG: 50 INJECTION, SOLUTION INTRAMUSCULAR; INTRAVENOUS at 01:54

## 2024-01-01 RX ADMIN — GUAIFENESIN 200 MG: 100 LIQUID ORAL at 14:25

## 2024-01-01 RX ADMIN — ATORVASTATIN CALCIUM 40 MG: 20 TABLET, FILM COATED ORAL at 08:10

## 2024-01-01 RX ADMIN — SODIUM CHLORIDE 125 ML/HR: 9 INJECTION, SOLUTION INTRAVENOUS at 06:34

## 2024-01-01 RX ADMIN — FENTANYL CITRATE 25 MCG: 50 INJECTION, SOLUTION INTRAMUSCULAR; INTRAVENOUS at 07:52

## 2024-01-01 RX ADMIN — SENNOSIDES AND DOCUSATE SODIUM 2 TABLET: 50; 8.6 TABLET ORAL at 20:13

## 2024-01-01 RX ADMIN — GUAIFENESIN 200 MG: 100 LIQUID ORAL at 15:17

## 2024-01-01 RX ADMIN — SENNOSIDES AND DOCUSATE SODIUM 2 TABLET: 50; 8.6 TABLET ORAL at 08:41

## 2024-01-01 RX ADMIN — SENNOSIDES AND DOCUSATE SODIUM 2 TABLET: 50; 8.6 TABLET ORAL at 08:25

## 2024-01-01 RX ADMIN — POTASSIUM CHLORIDE 40 MEQ: 1.5 FOR SOLUTION ORAL at 08:09

## 2024-01-01 RX ADMIN — ATORVASTATIN CALCIUM 40 MG: 20 TABLET, FILM COATED ORAL at 09:42

## 2024-01-01 RX ADMIN — IPRATROPIUM BROMIDE AND ALBUTEROL SULFATE 3 ML: 2.5; .5 SOLUTION RESPIRATORY (INHALATION) at 19:55

## 2024-01-01 RX ADMIN — SODIUM CHLORIDE, POTASSIUM CHLORIDE, SODIUM LACTATE AND CALCIUM CHLORIDE 1000 ML: 600; 310; 30; 20 INJECTION, SOLUTION INTRAVENOUS at 01:07

## 2024-01-01 RX ADMIN — CHLORHEXIDINE GLUCONATE 15 ML: 1.2 RINSE ORAL at 20:30

## 2024-01-01 RX ADMIN — DEXTROSE MONOHYDRATE 25 G: 25 INJECTION, SOLUTION INTRAVENOUS at 20:30

## 2024-01-01 RX ADMIN — GUAIFENESIN 200 MG: 100 LIQUID ORAL at 20:05

## 2024-01-01 RX ADMIN — GUAIFENESIN 200 MG: 100 LIQUID ORAL at 17:52

## 2024-01-01 RX ADMIN — SENNOSIDES AND DOCUSATE SODIUM 2 TABLET: 50; 8.6 TABLET ORAL at 20:30

## 2024-01-01 RX ADMIN — PANTOPRAZOLE SODIUM 40 MG: 40 INJECTION, POWDER, FOR SOLUTION INTRAVENOUS at 09:43

## 2024-01-01 RX ADMIN — IPRATROPIUM BROMIDE AND ALBUTEROL SULFATE 3 ML: 2.5; .5 SOLUTION RESPIRATORY (INHALATION) at 19:43

## 2024-01-01 RX ADMIN — MEROPENEM 1000 MG: 1 INJECTION, POWDER, FOR SOLUTION INTRAVENOUS at 21:08

## 2024-01-01 RX ADMIN — POTASSIUM CHLORIDE 10 MEQ: 7.46 INJECTION, SOLUTION INTRAVENOUS at 14:15

## 2024-01-01 RX ADMIN — SODIUM CHLORIDE 1000 MG: 9 INJECTION, SOLUTION INTRAVENOUS at 14:25

## 2024-01-01 RX ADMIN — DEXTROSE MONOHYDRATE 25 G: 25 INJECTION, SOLUTION INTRAVENOUS at 06:37

## 2024-01-01 RX ADMIN — IPRATROPIUM BROMIDE AND ALBUTEROL SULFATE 3 ML: 2.5; .5 SOLUTION RESPIRATORY (INHALATION) at 11:07

## 2024-01-01 RX ADMIN — GUAIFENESIN 200 MG: 100 LIQUID ORAL at 08:57

## 2024-01-01 RX ADMIN — CHLORHEXIDINE GLUCONATE 15 ML: 1.2 RINSE ORAL at 21:00

## 2024-01-01 RX ADMIN — ENOXAPARIN SODIUM 40 MG: 100 INJECTION SUBCUTANEOUS at 15:12

## 2024-01-01 RX ADMIN — SODIUM CHLORIDE 50 ML/HR: 9 INJECTION, SOLUTION INTRAVENOUS at 10:59

## 2024-01-01 RX ADMIN — DAKIN'S SOLUTION 0.125% (QUARTER STRENGTH): 0.12 SOLUTION at 20:13

## 2024-01-01 RX ADMIN — Medication 0.3 MCG/KG/MIN: at 11:52

## 2024-01-01 RX ADMIN — FENTANYL CITRATE 50 MCG: 50 INJECTION, SOLUTION INTRAMUSCULAR; INTRAVENOUS at 14:58

## 2024-01-01 RX ADMIN — SODIUM CHLORIDE 50 ML/HR: 9 INJECTION, SOLUTION INTRAVENOUS at 09:24

## 2024-01-01 RX ADMIN — GUAIFENESIN 200 MG: 100 LIQUID ORAL at 14:45

## 2024-01-01 RX ADMIN — VANCOMYCIN HYDROCHLORIDE 1250 MG: 1.25 INJECTION, POWDER, LYOPHILIZED, FOR SOLUTION INTRAVENOUS at 08:11

## 2024-01-01 RX ADMIN — MEROPENEM 1000 MG: 1 INJECTION, POWDER, FOR SOLUTION INTRAVENOUS at 21:42

## 2024-01-01 RX ADMIN — Medication 0.08 MCG/KG/MIN: at 10:13

## 2024-01-01 RX ADMIN — DEXTROSE MONOHYDRATE 30 ML/HR: 50 INJECTION, SOLUTION INTRAVENOUS at 12:16

## 2024-01-01 RX ADMIN — CHLORHEXIDINE GLUCONATE 15 ML: 1.2 RINSE ORAL at 08:21

## 2024-01-01 RX ADMIN — IPRATROPIUM BROMIDE AND ALBUTEROL SULFATE 3 ML: 2.5; .5 SOLUTION RESPIRATORY (INHALATION) at 21:06

## 2024-01-01 RX ADMIN — Medication 0.2 MCG/KG/MIN: at 09:25

## 2024-01-01 RX ADMIN — SODIUM CHLORIDE 1000 ML: 9 INJECTION, SOLUTION INTRAVENOUS at 23:21

## 2024-01-01 RX ADMIN — GUAIFENESIN 200 MG: 100 LIQUID ORAL at 08:42

## 2024-01-01 RX ADMIN — CEFTRIAXONE 2000 MG: 2 INJECTION, POWDER, FOR SOLUTION INTRAMUSCULAR; INTRAVENOUS at 14:25

## 2024-01-01 RX ADMIN — ENOXAPARIN SODIUM 40 MG: 100 INJECTION SUBCUTANEOUS at 13:35

## 2024-01-01 RX ADMIN — DEXTROSE MONOHYDRATE 100 ML: 50 INJECTION, SOLUTION INTRAVENOUS at 20:31

## 2024-01-01 RX ADMIN — PANTOPRAZOLE SODIUM 40 MG: 40 INJECTION, POWDER, FOR SOLUTION INTRAVENOUS at 08:10

## 2024-01-01 RX ADMIN — DEXTROSE MONOHYDRATE 100 ML: 50 INJECTION, SOLUTION INTRAVENOUS at 02:52

## 2024-01-01 RX ADMIN — MEROPENEM 1000 MG: 1 INJECTION, POWDER, FOR SOLUTION INTRAVENOUS at 13:37

## 2024-01-01 RX ADMIN — IPRATROPIUM BROMIDE AND ALBUTEROL SULFATE 3 ML: 2.5; .5 SOLUTION RESPIRATORY (INHALATION) at 19:24

## 2024-01-01 RX ADMIN — LEVOTHYROXINE SODIUM 25 MCG: 25 TABLET ORAL at 05:43

## 2024-01-01 RX ADMIN — POTASSIUM CHLORIDE 40 MEQ: 1.5 FOR SOLUTION ORAL at 08:57

## 2024-01-01 RX ADMIN — ENOXAPARIN SODIUM 40 MG: 100 INJECTION SUBCUTANEOUS at 16:32

## 2024-01-01 RX ADMIN — SENNOSIDES AND DOCUSATE SODIUM 2 TABLET: 50; 8.6 TABLET ORAL at 08:38

## 2024-01-01 RX ADMIN — SENNOSIDES AND DOCUSATE SODIUM 2 TABLET: 50; 8.6 TABLET ORAL at 08:57

## 2024-01-01 RX ADMIN — FUROSEMIDE 20 MG: 10 INJECTION, SOLUTION INTRAMUSCULAR; INTRAVENOUS at 20:31

## 2024-01-01 RX ADMIN — PROPOFOL INJECTABLE EMULSION 20 MCG/KG/MIN: 10 INJECTION, EMULSION INTRAVENOUS at 20:17

## 2024-01-01 RX ADMIN — FUROSEMIDE 20 MG: 10 INJECTION, SOLUTION INTRAMUSCULAR; INTRAVENOUS at 20:23

## 2024-01-01 RX ADMIN — DEXTROSE MONOHYDRATE 100 ML: 50 INJECTION, SOLUTION INTRAVENOUS at 15:12

## 2024-01-01 RX ADMIN — ALBUMIN (HUMAN) 25 G: 0.25 INJECTION, SOLUTION INTRAVENOUS at 02:43

## 2024-01-01 RX ADMIN — PROPOFOL INJECTABLE EMULSION 15 MCG/KG/MIN: 10 INJECTION, EMULSION INTRAVENOUS at 05:05

## 2024-01-01 RX ADMIN — DEXTROSE MONOHYDRATE 25 G: 25 INJECTION, SOLUTION INTRAVENOUS at 11:48

## 2024-01-01 RX ADMIN — SENNOSIDES AND DOCUSATE SODIUM 2 TABLET: 50; 8.6 TABLET ORAL at 21:41

## 2024-01-01 RX ADMIN — VASOPRESSIN 0.03 UNITS/MIN: 20 INJECTION, SOLUTION INTRAVENOUS at 05:07

## 2024-01-01 RX ADMIN — GUAIFENESIN 200 MG: 100 LIQUID ORAL at 02:39

## 2024-01-01 RX ADMIN — GUAIFENESIN 200 MG: 100 LIQUID ORAL at 20:59

## 2024-01-01 RX ADMIN — SODIUM CHLORIDE 1000 MG: 900 INJECTION INTRAVENOUS at 22:32

## 2024-01-01 RX ADMIN — GUAIFENESIN 200 MG: 100 LIQUID ORAL at 15:21

## 2024-01-01 RX ADMIN — GUAIFENESIN 200 MG: 100 LIQUID ORAL at 01:01

## 2024-01-01 RX ADMIN — MORPHINE SULFATE 2 MG: 2 INJECTION, SOLUTION INTRAMUSCULAR; INTRAVENOUS at 21:41

## 2024-01-01 RX ADMIN — Medication 0.18 MCG/KG/MIN: at 20:17

## 2024-01-01 RX ADMIN — DAKIN'S SOLUTION 0.125% (QUARTER STRENGTH): 0.12 SOLUTION at 20:46

## 2024-01-01 RX ADMIN — SODIUM CHLORIDE 1000 MG: 900 INJECTION INTRAVENOUS at 22:24

## 2024-01-01 RX ADMIN — ENOXAPARIN SODIUM 40 MG: 100 INJECTION SUBCUTANEOUS at 16:00

## 2024-01-01 RX ADMIN — Medication 0.12 MCG/KG/MIN: at 13:00

## 2024-01-01 RX ADMIN — PANTOPRAZOLE SODIUM 40 MG: 40 INJECTION, POWDER, FOR SOLUTION INTRAVENOUS at 08:34

## 2024-01-01 RX ADMIN — SENNOSIDES AND DOCUSATE SODIUM 2 TABLET: 50; 8.6 TABLET ORAL at 20:31

## 2024-01-01 RX ADMIN — FUROSEMIDE 20 MG: 10 INJECTION, SOLUTION INTRAMUSCULAR; INTRAVENOUS at 20:41

## 2024-01-01 RX ADMIN — ACETAMINOPHEN 325MG 650 MG: 325 TABLET ORAL at 23:52

## 2024-01-01 RX ADMIN — GUAIFENESIN 200 MG: 100 LIQUID ORAL at 03:14

## 2024-01-01 RX ADMIN — CEFTRIAXONE 2000 MG: 2 INJECTION, POWDER, FOR SOLUTION INTRAMUSCULAR; INTRAVENOUS at 12:59

## 2024-01-01 RX ADMIN — DEXTROSE MONOHYDRATE 25 G: 25 INJECTION, SOLUTION INTRAVENOUS at 01:00

## 2024-01-01 RX ADMIN — GUAIFENESIN 200 MG: 100 LIQUID ORAL at 09:42

## 2024-01-01 RX ADMIN — SODIUM CHLORIDE 50 ML/HR: 9 INJECTION, SOLUTION INTRAVENOUS at 13:53

## 2024-01-01 RX ADMIN — POTASSIUM CHLORIDE 40 MEQ: 1.5 FOR SOLUTION ORAL at 02:02

## 2024-01-01 RX ADMIN — POTASSIUM CHLORIDE 40 MEQ: 1.5 FOR SOLUTION ORAL at 03:05

## 2024-01-01 RX ADMIN — MEROPENEM 2000 MG: 2 INJECTION, POWDER, FOR SOLUTION INTRAVENOUS at 23:28

## 2024-01-01 RX ADMIN — ROCURONIUM BROMIDE 70 MG: 10 INJECTION, SOLUTION INTRAVENOUS at 00:30

## 2024-01-01 RX ADMIN — MEROPENEM 1000 MG: 1 INJECTION, POWDER, FOR SOLUTION INTRAVENOUS at 05:17

## 2024-01-01 RX ADMIN — SODIUM CHLORIDE 125 ML/HR: 9 INJECTION, SOLUTION INTRAVENOUS at 08:34

## 2024-01-01 RX ADMIN — VANCOMYCIN HYDROCHLORIDE 750 MG: 750 INJECTION, POWDER, LYOPHILIZED, FOR SOLUTION INTRAVENOUS at 01:00

## 2024-01-01 RX ADMIN — SODIUM CHLORIDE 1000 MG: 900 INJECTION INTRAVENOUS at 22:41

## 2024-01-01 RX ADMIN — IPRATROPIUM BROMIDE AND ALBUTEROL SULFATE 3 ML: 2.5; .5 SOLUTION RESPIRATORY (INHALATION) at 19:57

## 2024-01-01 RX ADMIN — FUROSEMIDE 20 MG: 10 INJECTION, SOLUTION INTRAMUSCULAR; INTRAVENOUS at 08:39

## 2024-01-01 RX ADMIN — CHLORHEXIDINE GLUCONATE 15 ML: 1.2 RINSE ORAL at 08:31

## 2024-01-01 RX ADMIN — GUAIFENESIN 200 MG: 100 LIQUID ORAL at 03:05

## 2024-01-01 RX ADMIN — ACETAMINOPHEN 325MG 650 MG: 325 TABLET ORAL at 22:57

## 2024-01-01 RX ADMIN — GUAIFENESIN 200 MG: 100 LIQUID ORAL at 08:31

## 2024-01-01 RX ADMIN — LEVOTHYROXINE SODIUM 25 MCG: 25 TABLET ORAL at 05:17

## 2024-01-01 RX ADMIN — POTASSIUM CHLORIDE 40 MEQ: 1.5 FOR SOLUTION ORAL at 20:59

## 2024-01-01 RX ADMIN — POTASSIUM CHLORIDE 40 MEQ: 1.5 FOR SOLUTION ORAL at 17:52

## 2024-01-01 RX ADMIN — SENNOSIDES AND DOCUSATE SODIUM 2 TABLET: 50; 8.6 TABLET ORAL at 08:10

## 2024-01-01 RX ADMIN — ENOXAPARIN SODIUM 40 MG: 100 INJECTION SUBCUTANEOUS at 17:52

## 2024-01-01 RX ADMIN — CHLORHEXIDINE GLUCONATE 15 ML: 1.2 RINSE ORAL at 08:59

## 2024-01-01 RX ADMIN — CHLORHEXIDINE GLUCONATE 15 ML: 1.2 RINSE ORAL at 09:10

## 2024-01-01 RX ADMIN — FUROSEMIDE 20 MG: 10 INJECTION, SOLUTION INTRAMUSCULAR; INTRAVENOUS at 09:43

## 2024-01-01 RX ADMIN — CHLORHEXIDINE GLUCONATE 15 ML: 1.2 RINSE ORAL at 20:52

## 2024-01-01 RX ADMIN — CHLORHEXIDINE GLUCONATE 15 ML: 1.2 RINSE ORAL at 21:17

## 2024-01-01 RX ADMIN — PANTOPRAZOLE SODIUM 40 MG: 40 INJECTION, POWDER, FOR SOLUTION INTRAVENOUS at 08:38

## 2024-01-01 NOTE — TELEPHONE ENCOUNTER
I wasn't planning on rechecking labs any time soon, so he can have them done through them and they should fax them to me.     
Left VM for pt explaining that he can have labs checked through rheumatology and have the results faxed to us.    KD  
Patient saw [facility] and they want labs ordered on this patient. He was wanting to know if Courtney wanted any of the same labs done on him. If so could [facility] order them for him and just send the results to [facility]?     Labs are:     CMP  C-REACTIVE PROTEIN  CBC (incluses DIFF/PLT)  SED RATE BY MODIFIED WESTGREN      
Please advise.    KD  
1

## 2024-01-01 NOTE — PROGRESS NOTES
"    Name: Anthony Gallegos ADMIT: 2023   : 1945  PCP: Marco Macedo MD    MRN: [id number] LOS: 10 days   AGE/SEX: 78 y.o. male  ROOM: [facility]     Subjective   Subjective   No new complaints \"just fine!\" and when I was leaving he said \"one more thing- Happy New Year!\". Resting in bed comfortably.     Objective   Objective   Vital Signs  Temp:  [98 °F (36.7 °C)-98.5 °F (36.9 °C)] 98.4 °F (36.9 °C)  Heart Rate:  [58-68] 58  Resp:  [18] 18  BP: (102-120)/(53-92) 115/56  SpO2:  [97 %-100 %] 100 %  on   ;   Device (Oxygen Therapy): room air  Body mass index is 22.18 kg/m².    Physical Exam  Constitutional:       General: He is not in acute distress.     Appearance: Normal appearance. He is not toxic-appearing.   HENT:      Head: Normocephalic and atraumatic.   Cardiovascular:      Rate and Rhythm: Normal rate and regular rhythm.   Pulmonary:      Effort: Pulmonary effort is normal. No respiratory distress.      Breath sounds: Normal breath sounds.   Abdominal:      General: Bowel sounds are normal.      Palpations: Abdomen is soft.      Tenderness: There is no abdominal tenderness.   Musculoskeletal:         General: No swelling.   Skin:     General: Skin is warm and dry.   Neurological:      General: No focal deficit present.      Mental Status: He is alert.   Psychiatric:         Mood and Affect: Mood normal.         Behavior: Behavior normal.     Results Review  I reviewed the patient's new clinical results.        Results from last 7 days   Lab Units 23  0602   SODIUM mmol/L 137   POTASSIUM mmol/L 4.1   CHLORIDE mmol/L 103   CO2 mmol/L 28.0   BUN mg/dL 18   CREATININE mg/dL 0.70*   GLUCOSE mg/dL 89     Lab Results   Component Value Date    ANIONGAP 6.0 2023     Estimated Creatinine Clearance: 97.4 mL/min (A) (by C-G formula based on SCr of 0.7 mg/dL (L)).   Lab Results   Component Value Date    EGFR 93.7 2023           Results from last 7 days   Lab Units 23  0602   CALCIUM mg/dL 8.8 "       Glucose   Date/Time Value Ref Range Status   12/30/2023 2027 117 70 - 130 mg/dL Final       No radiology results for the last day    Scheduled Meds  acetaminophen, 1,000 mg, Oral, TID  [Held by provider] atenolol, 25 mg, Oral, Q24H  enoxaparin, 40 mg, Subcutaneous, Q24H  Lidocaine, 2 patch, Transdermal, Q24H  methotrexate, 2.5 mg, Oral, Once per day on Wed Thu  pantoprazole, 40 mg, Oral, Q AM  senna-docusate sodium, 2 tablet, Oral, BID  sodium chloride, 10 mL, Intravenous, Q12H  tamsulosin, 0.4 mg, Oral, Nightly    Continuous Infusions   PRN Meds    acetaminophen **OR** acetaminophen **OR** acetaminophen    senna-docusate sodium **AND** polyethylene glycol **AND** bisacodyl **AND** bisacodyl    calcium carbonate    Calcium Replacement - Follow Nurse / BPA Driven Protocol    Magnesium Standard Dose Replacement - Follow Nurse / BPA Driven Protocol    melatonin    muscle rub    ondansetron ODT **OR** ondansetron    Phosphorus Replacement - Follow Nurse / BPA Driven Protocol    Potassium Replacement - Follow Nurse / BPA Driven Protocol    [COMPLETED] Insert Peripheral IV **AND** sodium chloride    sodium chloride    sodium chloride     Diet  Diet: Cardiac Diets; Healthy Heart (2-3 Na+); Texture: Regular Texture (IDDSI 7); Fluid Consistency: Thin (IDDSI 0)    I have personally reviewed:  [x]  Medications  []  Laboratory   []  Microbiology   []  Radiology   []  EKG/Telemetry   []  Cardiology/Vascular   []  Pathology   []  Records      Assessment/Plan     Active Hospital Problems    Diagnosis  POA    **Generalized weakness [R53.1]  Yes    Severe malnutrition [E43]  Yes    Immobility [Z74.09]  Yes    Fall from bed [W06.XXXA]  Yes    Tetrahydrocannabinol (THC) use disorder, mild, abuse [F12.10]  Yes    Generalized pain [R52]  Yes     [Z63.4]  Yes    Grief [F43.21]  Yes    DISH (disseminated idiopathic skeletal hyperostosis) [M48.10]  Yes    Hypertension [I10]  Yes    Ankylosing spondylitis of cervical region  [M45.2]  Yes      Resolved Hospital Problems    Diagnosis Date Resolved POA    Urine retention [R33.9] 12/24/2023 Yes    Constipation [K59.00] 12/24/2023 Yes     78 y.o. male admitted with generalized weakness    Generalized weakness  Immobility  Fall from bed  Multifactorial secondary to age and chronic comorbidities  PT/OT following     Generalized pain  Disseminated idiopathic skeletal hyperostosis  Ankylosing spondylitis  X-ray of lumbar spine with diffuse ankylosis without any obvious injury or fracture.  methotrexate     Urinary retention  Constipation  catheter is out. Flomax added.  Urology evaluated     Elevated CK, improved  Lactic acidosis, resolved  related to dehydration     Hypertension  Still with some low normal BPs and heart rate but not as low today (no SBPs in the 90s). Metoprolol remains on hold    DVT prophylaxis  Lovenox 40 mg SC daily    Discharge  Awaiting SNF  Expected discharge date/ time has not been documented.    Discussed with patient and nursing staff    Ancelmo Shirley MD  [facility]  01/01/24

## 2024-01-01 NOTE — PLAN OF CARE
Goal Outcome Evaluation:         Patient is an alert and oriented gentleman who is awaiting placement.  He denies pain or distress.  Up to chair with assistance.  Son visited.    TM

## 2024-01-02 PROCEDURE — 97530 THERAPEUTIC ACTIVITIES: CPT

## 2024-01-02 PROCEDURE — 25010000002 ENOXAPARIN PER 10 MG: Performed by: HOSPITALIST

## 2024-01-02 RX ADMIN — TAMSULOSIN HYDROCHLORIDE 0.4 MG: 0.4 CAPSULE ORAL at 21:08

## 2024-01-02 RX ADMIN — SENNOSIDES AND DOCUSATE SODIUM 2 TABLET: 50; 8.6 TABLET ORAL at 21:08

## 2024-01-02 RX ADMIN — ACETAMINOPHEN 1000 MG: 500 TABLET ORAL at 21:08

## 2024-01-02 RX ADMIN — PANTOPRAZOLE SODIUM 40 MG: 40 TABLET, DELAYED RELEASE ORAL at 05:54

## 2024-01-02 RX ADMIN — ENOXAPARIN SODIUM 40 MG: 100 INJECTION SUBCUTANEOUS at 13:14

## 2024-01-02 NOTE — PLAN OF CARE
Problem: Adult Inpatient Plan of Care  Goal: Plan of Care Review  Outcome: Ongoing, Progressing  Flowsheets (Taken 1/2/2024 0626)  Progress: improving  Plan of Care Reviewed With: patient  Outcome Evaluation: Patient is alert and oriented x 4. Assist x1. No complaints of pain or discomfort. Awaiting placement for discharge.VSS. Continue with plan of care.   Goal Outcome Evaluation:  Plan of Care Reviewed With: patient        Progress: improving  Outcome Evaluation: Patient is alert and oriented x 4. Assist x1. No complaints of pain or discomfort. Awaiting placement for discharge.VSS. Continue with plan of care.

## 2024-01-02 NOTE — PLAN OF CARE
Goal Outcome Evaluation:  Plan of Care Reviewed With: patient        Progress: no change  Outcome Evaluation: Pt agreeable to PT and able to maintain activity level during PT session today. Pt up in chair; bed mobility deferred. Pt performed STS transfer with CGA/min A and rwx. Pt ambulated 50' with CGA/min A and rwx, slightly unsteady during ambulation but no overt LOB. Pt requires cues for safety and attention to task throughout session and will continue to benefit from PT to address impairments and improve safety with mobility.

## 2024-01-02 NOTE — PLAN OF CARE
Problem: Malnutrition  Goal: Improved Nutritional Intake  Outcome: Ongoing, Progressing   Goal Outcome Evaluation:      Eating well, 75% PO per EMR. Enjoys the Ensures. Provided Ensure coupons to pt to use to buy supplements at home. Will CTM PO/ONS intake.

## 2024-01-02 NOTE — THERAPY PROGRESS REPORT/RE-CERT
Patient Name: Anthony Gallegos  : 1945    MRN: [id number]                              Today's Date: 2024       Admit Date: 2023    Visit Dx:     ICD-10-CM ICD-9-CM   1. Muscle pain  M79.10 729.1   2. Dizziness  R42 780.4   3. Immobility  Z74.09 799.89   4. Fall, initial encounter  W19.XXXA E888.9   5. Elevated CK  R74.8 790.5   6. Arthralgia, unspecified joint  M25.50 719.40   7. Ankylosing spondylitis of cervical region  M45.2 720.0     Patient Active Problem List   Diagnosis    Ankylosing spondylitis of cervical region    Recent unexplained weight loss    Abnormal serum lipase level    Abnormal serum level of amylase    Hypertension    Boil    Immobility    Fall from bed    Tetrahydrocannabinol (THC) use disorder, mild, abuse    Generalized pain        Grief    DISH (disseminated idiopathic skeletal hyperostosis)    Generalized weakness    Severe malnutrition     Past Medical History:   Diagnosis Date    Abscess of scrotum     Arthritis     AS (ankylosing spondylitis)     Hypertension     Tetrahydrocannabinol (THC) use disorder, mild, abuse 2023    Uveitis      Past Surgical History:   Procedure Laterality Date    COLONOSCOPY      ROTATOR CUFF REPAIR Right     TOTAL HIP ARTHROPLASTY Left       General Information       Row Name 24 1551          Physical Therapy Time and Intention    Document Type therapy note (daily note)  -EE     Mode of Treatment individual therapy;physical therapy  -EE       Row Name 24 1551          General Information    Existing Precautions/Restrictions fall  -EE     Barriers to Rehab none identified  -EE       Row Name 24 1551          Cognition    Orientation Status (Cognition) oriented x 4  -EE       Row Name 24 1551          Safety Issues, Functional Mobility    Impairments Affecting Function (Mobility) balance;endurance/activity tolerance;strength  -EE               User Key  (r) = Recorded By, (t) = Taken By, (c) = Cosigned By       Initials Name Provider Type    EE Lo Grajeda PT Physical Therapist                   Mobility       Row Name 01/02/24 1552          Bed Mobility    Comment, (Bed Mobility) not tested; pt up in chair  -EE       Row Name 01/02/24 1552          Sit-Stand Transfer    Sit-Stand [place] (Transfers) contact guard;minimum assist (75% patient effort);verbal cues  -EE     Assistive Device (Sit-Stand Transfers) walker, front-wheeled  -EE     Comment, (Sit-Stand Transfer) STS x3 from chair surface, cues for hand placement  -EE       Row Name 01/02/24 1552          Gait/Stairs (Locomotion)    [place] Level (Gait) contact guard;minimum assist (75% patient effort);verbal cues  -EE     Assistive Device (Gait) walker, front-wheeled  -EE     Distance in Feet (Gait) 50'  x2 trials; seated rest break required between trials  -EE     Deviations/Abnormal Patterns (Gait) ranjit decreased;base of support, narrow;stride length decreased  -EE     Bilateral Gait Deviations forward flexed posture;weight shift ability decreased  -EE     Right Sided Gait Deviations decreased knee extension  -EE     Comment, (Gait/Stairs) Slow pace, slightly unsteady, cues for attention and safety throughout  -EE               User Key  (r) = Recorded By, (t) = Taken By, (c) = Cosigned By      Initials Name Provider Type    EE Lo Grajeda PT Physical Therapist                   Obj/Interventions    No documentation.                  Goals/Plan       Row Name 01/02/24 1556          Bed Mobility Goal 1 (PT)    Activity/Assistive Device (Bed Mobility Goal 1, PT) bed mobility activities, all  -EE     [place] Level/Cues Needed (Bed Mobility Goal 1, PT) supervision required  -EE     Time Frame (Bed Mobility Goal 1, PT) 1 week  -EE     Progress/Outcomes (Bed Mobility Goal 1, PT) goal ongoing  -EE       Row Name 01/02/24 1555          Transfer Goal 1 (PT)    Activity/Assistive Device (Transfer Goal 1, PT) transfers, all;walker, rolling  -EE      [place] Level/Cues Needed (Transfer Goal 1, PT) supervision required  -EE     Time Frame (Transfer Goal 1, PT) 1 week  -EE     Progress/Outcome (Transfer Goal 1, PT) goal revised this date;goal ongoing  -EE       Row Name 01/02/24 1555          Gait Training Goal 1 (PT)    Activity/Assistive Device (Gait Training Goal 1, PT) gait (walking locomotion);walker, rolling  -EE     [place] Level (Gait Training Goal 1, PT) standby assist  -EE     Distance (Gait Training Goal 1, PT) 100'  -EE     Time Frame (Gait Training Goal 1, PT) 1 week  -EE     Progress/Outcome (Gait Training Goal 1, PT) goal revised this date  -EE               User Key  (r) = Recorded By, (t) = Taken By, (c) = Cosigned By      Initials Name Provider Type    EE Lo Grajeda PT Physical Therapist                   Clinical Impression       Row Name 01/02/24 1553          Pain    Pretreatment Pain Rating 0/10 - no pain  -EE     Posttreatment Pain Rating 0/10 - no pain  -EE       Row Name 01/02/24 1553          Plan of Care Review    Plan of Care Reviewed With patient  -EE     Progress no change  -EE     Outcome Evaluation Pt agreeable to PT and able to maintain activity level during PT session today. Pt up in chair; bed mobility deferred. Pt performed STS transfer with CGA/min A and rwx. Pt ambulated 50' with CGA/min A and rwx, slightly unsteady during ambulation but no overt LOB. Pt requires cues for safety and attention to task throughout session and will continue to benefit from PT to address impairments and improve safety with mobility.  -EE       Row Name 01/02/24 155          Positioning and Restraints    Pre-Treatment Position sitting in chair/recliner  -EE     Post Treatment Position chair  -EE     In Chair reclined;call light within reach;encouraged to call for assist;exit alarm on;legs elevated  -EE               User Key  (r) = Recorded By, (t) = Taken By, (c) = Cosigned By      Initials Name Provider Type    Lo Yusuf PT  Physical Therapist                   Outcome Measures       Row Name 01/02/24 1554 01/02/24 1200       How much help from another person do you currently need...    Turning from your back to your side while in flat bed without using bedrails? 4  -EE 4  -BN    Moving from lying on back to sitting on the side of a flat bed without bedrails? 3  -EE 3  -BN    Moving to and from a bed to a chair (including a wheelchair)? 3  -EE 3  -BN    Standing up from a chair using your arms (e.g., wheelchair, bedside chair)? 3  -EE 3  -BN    Climbing 3-5 steps with a railing? 2  -EE 2  -BN    To walk in hospital room? 3  -EE 3  -BN    AM-PAC 6 Clicks Score (PT) 18  -EE 18  -BN    Highest Level of Mobility Goal 6 --> Walk 10 steps or more  -EE 6 --> Walk 10 steps or more  -BN              User Key  (r) = Recorded By, (t) = Taken By, (c) = Cosigned By      Initials Name Provider Type    Lo Yusuf, PT Physical Therapist    Fernando Angel, RN Registered Nurse                                 Physical Therapy Education       Title: PT OT SLP Therapies (In Progress)       Topic: Physical Therapy (Done)       Point: Mobility training (Done)       Learning Progress Summary             Patient Acceptance, E,TB, VU,NR by NARAYAN at 1/2/2024 1554    Acceptance, E, VU,NR by MIA at 12/31/2023 1437    Acceptance, E,TB,D, VU,NR by AMELIA at 12/29/2023 1130    Acceptance, E, VU by SHLOMO at 12/27/2023 1554    Acceptance, E,TB, VU by KRISETN at 12/26/2023 1029    Acceptance, E,TB, VU by  at 12/23/2023 1618    Eager, E,TB,D, VU,NR by AMELIA at 12/22/2023 1700    Acceptance, E, VU by CLARISSA at 12/21/2023 1713                         Point: Home exercise program (Done)       Learning Progress Summary             Patient Acceptance, E, VU,NR by MIA at 12/31/2023 1437    Acceptance, E,TB,D, VU,NR by AMELIA at 12/29/2023 1130    Acceptance, E,TB, VU by KRISTEN at 12/26/2023 1029    Acceptance, E,TB, VU by  at 12/23/2023 1618    DILIP Moses TB, D, VU,NR by AMELIA at 12/22/2023 1700     Acceptance, E, VU by CLARISSA at 12/21/2023 1713                         Point: Body mechanics (Done)       Learning Progress Summary             Patient Acceptance, E,TB, VU,NR by EE at 1/2/2024 1554    Acceptance, E, VU,NR by MIA at 12/31/2023 1437    Acceptance, E,TB,D, VU,NR by AMELIA at 12/29/2023 1130    Acceptance, E, VU by LW at 12/27/2023 1554    Acceptance, E,TB, VU by KRISTEN at 12/26/2023 1029    Acceptance, E,TB, VU by  at 12/23/2023 1618    Eager, E,TB,D, VU,NR by AMELIA at 12/22/2023 1700    Acceptance, E, VU by CLARISSA at 12/21/2023 1713                         Point: Precautions (Done)       Learning Progress Summary             Patient Acceptance, E,TB, VU,NR by EE at 1/2/2024 1554    Acceptance, E, VU,NR by MIA at 12/31/2023 1437    Acceptance, E,TB,D, VU,NR by AMELIA at 12/29/2023 1130    Acceptance, E, VU by SHLOMO at 12/27/2023 1554    Acceptance, E,TB, VU by KRISTEN at 12/26/2023 1029    Acceptance, E,TB, VU by  at 12/23/2023 1618    Eager, E,TB,D, VU,NR by AMELIA at 12/22/2023 1700    Acceptance, E, VU by CLARISSA at 12/21/2023 1713                                         User Key       Initials Effective Dates Name Provider Type Discipline    RD 06/16/21 -  Mariposa Kennedy, PT Physical Therapist PT    EE 06/16/21 -  Lo Grajeda, PT Physical Therapist PT    AMELIA 03/07/18 -  Rosa Portillo, PTA Physical Therapist Assistant PT    MIA 06/16/21 -  Kirsty Mariscal, PT Physical Therapist PT    DB 06/16/21 -  Felipa Weathers, PT Physical Therapist PT    SHLOMO 05/08/23 -  Ashley Bertrand, PT Physical Therapist PT     05/24/23 -  Leobardo Weller, PT Physical Therapist PT                  PT Recommendation and Plan     Plan of Care Reviewed With: patient  Progress: no change  Outcome Evaluation: Pt agreeable to PT and able to maintain activity level during PT session today. Pt up in chair; bed mobility deferred. Pt performed STS transfer with CGA/min A and rwx. Pt ambulated 50' with CGA/min A and rwx, slightly unsteady during ambulation  but no overt LOB. Pt requires cues for safety and attention to task throughout session and will continue to benefit from PT to address impairments and improve safety with mobility.     Time Calculation:         PT Charges       Row Name 01/02/24 1555             Time Calculation    Start Time 1414  -EE      Stop Time 1430  -EE      Time Calculation (min) 16 min  -EE      PT Received On 01/02/24  -EE      PT - Next Appointment 01/03/24  -EE      PT Goal Re-Cert Due Date 01/09/24  -EE         Time Calculation- PT    Total Timed Code Minutes- PT 16 minute(s)  -EE                User Key  (r) = Recorded By, (t) = Taken By, (c) = Cosigned By      Initials Name Provider Type    EE Lo Grajeda, GERI Physical Therapist                  Therapy Charges for Today       Code Description Service Date Service Provider Modifiers Qty    51558817019 HC PT THERAPEUTIC ACT EA 15 MIN 1/2/2024 Lo Grajeda, PT GP 1            PT G-Codes  Outcome Measure Options: AM-PAC 6 Clicks Basic Mobility (PT)  AM-PAC 6 Clicks Score (PT): 18  AM-PAC 6 Clicks Score (OT): 14  Modified [place] Scale: 4 - Moderately severe disability.  Unable to walk without assistance, and unable to attend to own bodily needs without assistance.       Lo Grajeda PT  1/2/2024

## 2024-01-02 NOTE — CASE MANAGEMENT/SOCIAL WORK
Continued Stay Note  [facility]     Patient Name: Anthony Gallegos  MRN: [id number]  Today's Date: 1/2/2024    Admit Date: 12/20/2023    Plan: Pending pre-cert for [facility]   Discharge Plan       Row Name 01/02/24 1616       Plan    Plan Pending pre-cert for [facility]    Plan Comments CCP spoke with patient's daughter regarding accepting facilities. Patient's daughter states she just toured [facility] and would like [facility] for SNF. CCP spoke with Katy/[facility] and she will initiate pre-cert. Patient's daughter to transport. Stella DRUMMOND                   Discharge Codes    No documentation.                       HODA Jimenez

## 2024-01-02 NOTE — PROGRESS NOTES
"Nutrition Services    Patient Name:  Anthony Gallegos  YOB: 1945  MRN: [id number]  Admit Date:  12/20/2023    Assessment Date:  01/02/24    Summary: Follow up     Nutrition follow up completed. Visited pt at bedside. Eating well, 75% per EMR. Drinks the Ensures. He endorses he is doing \"just fine\". Provided Ensure coupons to the pt to use to buy supplements for home usage. Discharge planning in progress.   Meds: pericolace, PPI  Labs: no new labs available   Skin: stage 1 left upper back and bilateral coccyx pressure injuries, skin tear    REC:  Continue Ensure Compact vanilla BID B/D for additional calories/protein and to support PO intake, wt gn, and wound healing    Will CTM PO/ONS intake     RD to follow per protocol.    CLINICAL NUTRITION ASSESSMENT      Reason for Assessment Follow up protocol      Diagnosis/Problem   Immobility    Medical/Surgical History Past Medical History:   Diagnosis Date    Abscess of scrotum     Arthritis     AS (ankylosing spondylitis)     Hypertension     Tetrahydrocannabinol (THC) use disorder, mild, abuse 12/20/2023    Uveitis        Past Surgical History:   Procedure Laterality Date    COLONOSCOPY      ROTATOR CUFF REPAIR Right     TOTAL HIP ARTHROPLASTY Left 2014        Anthropometrics        Current Height  Current Weight  BMI kg/m2 Height: 190.5 cm (75\")  Weight: 80.5 kg (177 lb 7.5 oz) (12/31/23 1300)  Body mass index is 22.18 kg/m².   Adjusted BMI (if applicable)    BMI Category Normal/Healthy (18.4 - 24.9)   Ideal Body Weight (IBW) 84.5 kg    Usual Body Weight (UBW) 220 lbs    Weight Trend Loss 41 lbs wt loss x 5 yrs    Weight History Wt Readings from Last 30 Encounters:   12/31/23 1300 80.5 kg (177 lb 7.5 oz)   12/31/23 0350 83.8 kg (184 lb 11.9 oz)   12/30/23 0511 85.3 kg (188 lb 0.8 oz)   12/29/23 0341 85.7 kg (188 lb 15 oz)   12/28/23 0435 85.4 kg (188 lb 4.4 oz)   12/27/23 0755 81 kg (178 lb 9.2 oz)   12/27/23 0423 85.4 kg (188 lb 4.4 oz)   12/25/23 0707 " "85.2 kg (187 lb 13.3 oz)   12/23/23 0608 81.3 kg (179 lb 3.7 oz)   12/22/23 0430 81.8 kg (180 lb 5.4 oz)   12/20/23 1005 81.9 kg (180 lb 8 oz)   03/27/18 1439 100 kg (221 lb)   03/22/18 1613 101 kg (222 lb 12.8 oz)   01/23/18 1414 101 kg (223 lb)   12/26/17 1111 99.8 kg (220 lb)   10/19/17 1239 95.3 kg (210 lb)   08/30/17 1459 91.3 kg (201 lb 3.2 oz)   08/29/17 1221 93 kg (205 lb)   08/10/17 1517 88 kg (194 lb)   07/14/17 1041 85.9 kg (189 lb 6.4 oz)   06/29/17 1318 84.5 kg (186 lb 3.2 oz)   06/21/17 1922 90.7 kg (200 lb)        Estimated Requirements         Weight used  81.9 kg     Calories  2837-9424 (25 kcal/kg, 30 kcal/kg)    Protein  82-98 (1.0 - 1.2 gm/kg)    Fluid  1 mL/kcal      Labs       Pertinent Labs    Results from last 7 days   Lab Units 12/27/23  0602   SODIUM mmol/L 137   POTASSIUM mmol/L 4.1   CHLORIDE mmol/L 103   CO2 mmol/L 28.0   BUN mg/dL 18   CREATININE mg/dL 0.70*   CALCIUM mg/dL 8.8   GLUCOSE mg/dL 89                 No results found for: \"COVID19\"  No results found for: \"HGBA1C\"       Medications           Scheduled Medications acetaminophen, 1,000 mg, Oral, TID  [Held by provider] atenolol, 25 mg, Oral, Q24H  enoxaparin, 40 mg, Subcutaneous, Q24H  Lidocaine, 2 patch, Transdermal, Q24H  methotrexate, 2.5 mg, Oral, Once per day on Wed Thu  pantoprazole, 40 mg, Oral, Q AM  senna-docusate sodium, 2 tablet, Oral, BID  sodium chloride, 10 mL, Intravenous, Q12H  tamsulosin, 0.4 mg, Oral, Nightly       Infusions       PRN Medications   acetaminophen **OR** acetaminophen **OR** acetaminophen    senna-docusate sodium **AND** polyethylene glycol **AND** bisacodyl **AND** bisacodyl    calcium carbonate    Calcium Replacement - Follow Nurse / BPA Driven Protocol    Magnesium Standard Dose Replacement - Follow Nurse / BPA Driven Protocol    melatonin    muscle rub    ondansetron ODT **OR** ondansetron    Phosphorus Replacement - Follow Nurse / BPA Driven Protocol    Potassium Replacement - Follow Nurse " / BPA Driven Protocol    [COMPLETED] Insert Peripheral IV **AND** sodium chloride    sodium chloride    sodium chloride     Physical Findings          General Findings alert, frail, generalized wasting, loss of muscle mass, loss of subcutaneous fat, oriented, room air   Oral/Mouth Cavity tooth or teeth missing   Edema  lower extremity , 1+ (trace), 2+ (mild)   Gastrointestinal fecal incontinence, non-distended , normoactive, last bowel movement: 12/30   Skin  pressure injury: stage 1 left upper back and bilateral coccyx , skin tear   Tubes/Drains/Lines none   NFPE See Malnutrition Severity Assessment, Date Completed: 12/22     Malnutrition Severity Assessment      Patient meets criteria for : Severe Malnutrition (Pt meets ASPEN/AND criteria for nutrition dx of severe malnutrition of chronic disease related to muscle wasting, fat loss, fluid accumulation, and declining functional status.)       Current Nutrition Orders & Evaluation of Intake       Oral Nutrition     Food Allergies NKFA   Current PO Diet Diet: Cardiac Diets; Healthy Heart (2-3 Na+); Texture: Regular Texture (IDDSI 7); Fluid Consistency: Thin (IDDSI 0)   Supplement Ensure Compact vanilla BID B/D   PO Evaluation     % PO Intake 75%    Factors Affecting Intake: emotional status, pain issues, weakness   --  PES STATEMENT / NUTRITION DIAGNOSIS      Nutrition Dx Problem  Problem: Unintentional Weight Loss  Etiology: Medical Diagnosis - immobility, , grief and Factors Affecting Nutrition -  emotional status, weakness, pain issues     Signs/Symptoms: Unintended Weight Change     NUTRITION INTERVENTION / PLAN OF CARE      Intervention Goal(s) Maintain nutrition status, Reduce/improve symptoms, Meet estimated needs, Disease management/therapy, Maintain intake, Maintain weight, No significant weight loss, and PO intake goal %: 75%         RD Intervention/Action Encourage intake, Continue to monitor, and Care plan reviewed   --      Prescription/Orders:        PO Diet       Supplements Ensure Compact vanilla BID B/D      Enteral Nutrition       Parenteral Nutrition    New Prescription Ordered? Continue same per protocol, No changes at this time   --      Monitor/Evaluation Per protocol   Discharge Plan/Needs Pending clinical course   --    RD to follow per protocol.      Electronically signed by:  Rizwana Lugo RDN, YECENIA  01/02/24 12:27 EST

## 2024-01-02 NOTE — PROGRESS NOTES
"    Name: Anthony Gallegos ADMIT: 2023   : 1945  PCP: Marco Macedo MD    MRN: [id number] LOS: 11 days   AGE/SEX: 78 y.o. male  ROOM: [facility]     Subjective   Subjective   No new complaints \"just fine!\" and when I was leaving he said \"one more thing- Happy New Year!\". Resting in bed comfortably.     Objective   Objective   Vital Signs  Temp:  [97.2 °F (36.2 °C)-98.1 °F (36.7 °C)] 98.1 °F (36.7 °C)  Heart Rate:  [54-69] 69  Resp:  [17-18] 17  BP: (109-133)/(60-64) 133/63  SpO2:  [97 %-100 %] 100 %  on   ;   Device (Oxygen Therapy): room air  Body mass index is 22.18 kg/m².    Physical Exam  Constitutional:       General: He is not in acute distress.     Appearance: He is ill-appearing. He is not toxic-appearing.      Comments: Chronic ill appearing   HENT:      Head: Normocephalic and atraumatic.   Cardiovascular:      Rate and Rhythm: Normal rate and regular rhythm.   Pulmonary:      Effort: Pulmonary effort is normal. No respiratory distress.      Breath sounds: Normal breath sounds.   Abdominal:      General: Bowel sounds are normal.      Palpations: Abdomen is soft.      Tenderness: There is no abdominal tenderness.   Musculoskeletal:         General: No swelling.   Skin:     General: Skin is warm and dry.   Neurological:      General: No focal deficit present.      Mental Status: He is alert.   Psychiatric:         Mood and Affect: Mood normal.         Behavior: Behavior normal.     Results Review  I reviewed the patient's new clinical results.        Results from last 7 days   Lab Units 23  0602   SODIUM mmol/L 137   POTASSIUM mmol/L 4.1   CHLORIDE mmol/L 103   CO2 mmol/L 28.0   BUN mg/dL 18   CREATININE mg/dL 0.70*   GLUCOSE mg/dL 89     Lab Results   Component Value Date    ANIONGAP 6.0 2023     Estimated Creatinine Clearance: 97.4 mL/min (A) (by C-G formula based on SCr of 0.7 mg/dL (L)).   Lab Results   Component Value Date    EGFR 93.7 2023           Results from last 7 days "   Lab Units 12/27/23  0602   CALCIUM mg/dL 8.8       Glucose   Date/Time Value Ref Range Status   12/30/2023 2027 117 70 - 130 mg/dL Final       No radiology results for the last day    Scheduled Meds  acetaminophen, 1,000 mg, Oral, TID  [Held by provider] atenolol, 25 mg, Oral, Q24H  enoxaparin, 40 mg, Subcutaneous, Q24H  Lidocaine, 2 patch, Transdermal, Q24H  methotrexate, 2.5 mg, Oral, Once per day on Wed Thu  pantoprazole, 40 mg, Oral, Q AM  senna-docusate sodium, 2 tablet, Oral, BID  sodium chloride, 10 mL, Intravenous, Q12H  tamsulosin, 0.4 mg, Oral, Nightly    Continuous Infusions   PRN Meds    acetaminophen **OR** acetaminophen **OR** acetaminophen    senna-docusate sodium **AND** polyethylene glycol **AND** bisacodyl **AND** bisacodyl    calcium carbonate    Calcium Replacement - Follow Nurse / BPA Driven Protocol    Magnesium Standard Dose Replacement - Follow Nurse / BPA Driven Protocol    melatonin    muscle rub    ondansetron ODT **OR** ondansetron    Phosphorus Replacement - Follow Nurse / BPA Driven Protocol    Potassium Replacement - Follow Nurse / BPA Driven Protocol    [COMPLETED] Insert Peripheral IV **AND** sodium chloride    sodium chloride    sodium chloride     Diet  Diet: Cardiac Diets; Healthy Heart (2-3 Na+); Texture: Regular Texture (IDDSI 7); Fluid Consistency: Thin (IDDSI 0)    I have personally reviewed:  [x]  Medications  []  Laboratory   []  Microbiology   []  Radiology   []  EKG/Telemetry   []  Cardiology/Vascular   []  Pathology   []  Records      Assessment/Plan     Active Hospital Problems    Diagnosis  POA    **Generalized weakness [R53.1]  Yes    Severe malnutrition [E43]  Yes    Immobility [Z74.09]  Yes    Fall from bed [W06.XXXA]  Yes    Tetrahydrocannabinol (THC) use disorder, mild, abuse [F12.10]  Yes    Generalized pain [R52]  Yes     [Z63.4]  Yes    Grief [F43.21]  Yes    DISH (disseminated idiopathic skeletal hyperostosis) [M48.10]  Yes    Hypertension [I10]  Yes     Ankylosing spondylitis of cervical region [M45.2]  Yes      Resolved Hospital Problems    Diagnosis Date Resolved POA    Urine retention [R33.9] 12/24/2023 Yes    Constipation [K59.00] 12/24/2023 Yes     78 y.o. male admitted with generalized weakness    Generalized weakness  Immobility  Fall from bed  Multifactorial secondary to age and chronic comorbidities  PT/OT following     Generalized pain  Disseminated idiopathic skeletal hyperostosis  Ankylosing spondylitis  X-ray of lumbar spine with diffuse ankylosis without any obvious injury or fracture.  methotrexate     Urinary retention  Constipation  catheter is out. Flomax added.  Urology evaluated     Elevated CK, improved  Lactic acidosis, resolved  related to dehydration     Hypertension  Still with some low normal BPs and heart rate but not as low today (no SBPs in the 90s). Metoprolol remains on hold    DVT prophylaxis  Lovenox 40 mg SC daily    Discharge  Awaiting SNF  Expected discharge date/ time has not been documented.    Discussed with patient and nursing staff    Shola Haque MD  [facility]  01/02/24

## 2024-01-03 PROCEDURE — 25010000002 ENOXAPARIN PER 10 MG: Performed by: HOSPITALIST

## 2024-01-03 PROCEDURE — 63710000001 METHOTREXATE 2.5 MG TABLET: Performed by: INTERNAL MEDICINE

## 2024-01-03 PROCEDURE — 97530 THERAPEUTIC ACTIVITIES: CPT

## 2024-01-03 RX ADMIN — ACETAMINOPHEN 1000 MG: 500 TABLET ORAL at 09:07

## 2024-01-03 RX ADMIN — ACETAMINOPHEN 1000 MG: 500 TABLET ORAL at 20:17

## 2024-01-03 RX ADMIN — ACETAMINOPHEN 1000 MG: 500 TABLET ORAL at 14:41

## 2024-01-03 RX ADMIN — SENNOSIDES AND DOCUSATE SODIUM 2 TABLET: 50; 8.6 TABLET ORAL at 20:17

## 2024-01-03 RX ADMIN — TAMSULOSIN HYDROCHLORIDE 0.4 MG: 0.4 CAPSULE ORAL at 20:17

## 2024-01-03 RX ADMIN — PANTOPRAZOLE SODIUM 40 MG: 40 TABLET, DELAYED RELEASE ORAL at 05:16

## 2024-01-03 RX ADMIN — ENOXAPARIN SODIUM 40 MG: 100 INJECTION SUBCUTANEOUS at 12:57

## 2024-01-03 RX ADMIN — SENNOSIDES AND DOCUSATE SODIUM 2 TABLET: 50; 8.6 TABLET ORAL at 09:07

## 2024-01-03 RX ADMIN — METHOTREXATE 2.5 MG: 2.5 TABLET ORAL at 09:07

## 2024-01-03 NOTE — THERAPY TREATMENT NOTE
Patient Name: Anthony Gallegos  : 1945    MRN: [id number]                              Today's Date: 1/3/2024       Admit Date: 2023    Visit Dx:     ICD-10-CM ICD-9-CM   1. Muscle pain  M79.10 729.1   2. Dizziness  R42 780.4   3. Immobility  Z74.09 799.89   4. Fall, initial encounter  W19.XXXA E888.9   5. Elevated CK  R74.8 790.5   6. Arthralgia, unspecified joint  M25.50 719.40   7. Ankylosing spondylitis of cervical region  M45.2 720.0     Patient Active Problem List   Diagnosis    Ankylosing spondylitis of cervical region    Recent unexplained weight loss    Abnormal serum lipase level    Abnormal serum level of amylase    Hypertension    Boil    Immobility    Fall from bed    Tetrahydrocannabinol (THC) use disorder, mild, abuse    Generalized pain        Grief    DISH (disseminated idiopathic skeletal hyperostosis)    Generalized weakness    Severe malnutrition     Past Medical History:   Diagnosis Date    Abscess of scrotum     Arthritis     AS (ankylosing spondylitis)     Hypertension     Tetrahydrocannabinol (THC) use disorder, mild, abuse 2023    Uveitis      Past Surgical History:   Procedure Laterality Date    COLONOSCOPY      ROTATOR CUFF REPAIR Right     TOTAL HIP ARTHROPLASTY Left       General Information       Row Name 24 1044          Physical Therapy Time and Intention    Document Type therapy note (daily note)  -ER     Mode of Treatment individual therapy;physical therapy  -ER       Row Name 24 1044          General Information    Patient Profile Reviewed yes  -ER     Existing Precautions/Restrictions fall  -ER       Row Name 24 1044          Cognition    Orientation Status (Cognition) oriented x 4  -ER       Row Name 24 1044          Safety Issues, Functional Mobility    Impairments Affecting Function (Mobility) balance;endurance/activity tolerance;strength  -ER               User Key  (r) = Recorded By, (t) = Taken By, (c) = Cosigned By       Initials Name Provider Type    ER Sonia Fajardo PT Physical Therapist                   Mobility       Row Name 01/03/24 1044          Bed Mobility    Bed Mobility bed mobility (all) activities  -ER     All Activities, Barber (Bed Mobility) minimum assist (75% patient effort)  -ER     Comment, (Bed Mobility) posterior lean in sitting EOB  -ER       Row Name 01/03/24 1044          Sit-Stand Transfer    Sit-Stand Barber (Transfers) minimum assist (75% patient effort)  -ER     Assistive Device (Sit-Stand Transfers) walker, front-wheeled  -ER       Row Name 01/03/24 1044          Gait/Stairs (Locomotion)    Barber Level (Gait) minimum assist (75% patient effort)  -ER     Assistive Device (Gait) walker, front-wheeled  -ER     Patient was able to Ambulate yes  -ER     Distance in Feet (Gait) 25  -ER     Deviations/Abnormal Patterns (Gait) ranjit decreased;base of support, narrow;stride length decreased  -ER     Bilateral Gait Deviations forward flexed posture;weight shift ability decreased  -ER     Right Sided Gait Deviations decreased knee extension  -ER     Comment, (Gait/Stairs) slightly unsteady, able to ambulate ~25 ft. with Min A. cues for attention and safety while transferring to bedside chair.  -ER               User Key  (r) = Recorded By, (t) = Taken By, (c) = Cosigned By      Initials Name Provider Type    ER Sonia Fajardo PT Physical Therapist                   Obj/Interventions       Row Name 01/03/24 1047          Balance    Balance Assessment sitting static balance;standing static balance  -ER     Static Sitting Balance contact guard  posterior lean  -ER     Position, Sitting Balance unsupported;sitting edge of bed  -ER     Static Standing Balance contact guard;minimal assist  -ER     Position/Device Used, Standing Balance walker, front-wheeled  -ER     Balance Interventions sitting;standing;sit to stand;supported;static;dynamic  -ER               User Key  (r) = Recorded By, (t) = Taken  By, (c) = Cosigned By      Initials Name Provider Type    ER Sonia Fajardo, PT Physical Therapist                   Goals/Plan    No documentation.                  Clinical Impression       Row Name 01/03/24 1047          Pain    Pretreatment Pain Rating 0/10 - no pain  -ER     Posttreatment Pain Rating 0/10 - no pain  -ER     Pain Intervention(s) Rest;Repositioned;Ambulation/increased activity  -ER       Row Name 01/03/24 1047          Plan of Care Review    Plan of Care Reviewed With patient  -ER     Outcome Evaluation Anthony Gallegos is a 78 year old male seen for physical therapy treatment [facility] this morning. Pt. agreeable and performs BM with min A, posterior lean noted while sitting, CGA provided for sitting balance. Pt. performed STS from EOB with RW and Min A, EOB slightly elevated. Pt. able to ambulate ~25 ft, with some unsteadiness, however no overt LOB. Pt. left [facility] with all needs met and within reach. PT recommending SNF at d/c.  -ER       Row Name 01/03/24 1047          Therapy Assessment/Plan (PT)    Rehab Potential (PT) good, to achieve stated therapy goals  -ER     Criteria for Skilled Interventions Met (PT) yes  -ER     Therapy Frequency (PT) 5 times/wk  -ER       Row Name 01/03/24 1047          Positioning and Restraints    Pre-Treatment Position in bed  -ER     Post Treatment Position chair  -ER     In Chair reclined;call light within reach;encouraged to call for assist;legs elevated  -ER               User Key  (r) = Recorded By, (t) = Taken By, (c) = Cosigned By      Initials Name Provider Type    ER Sonia Fajardo, PT Physical Therapist                   Outcome Measures       Row Name 01/03/24 1052 01/03/24 0900       How much help from another person do you currently need...    Turning from your back to your side while in flat bed without using bedrails? 4  -ER 4  -HE    Moving from lying on back to sitting on the side of a flat bed without bedrails? 3  -ER 3  -HE    Moving to and from a bed to  a chair (including a wheelchair)? 3  -ER 3  -HE    Standing up from a chair using your arms (e.g., wheelchair, bedside chair)? 3  -ER 3  -HE    Climbing 3-5 steps with a railing? 2  -ER 2  -HE    To walk in hospital room? 3  -ER 3  -HE    AM-PAC 6 Clicks Score (PT) 18  -ER 18  -HE    Highest Level of Mobility Goal 6 --> Walk 10 steps or more  -ER 6 --> Walk 10 steps or more  -HE      Row Name 01/03/24 1052          Functional Assessment    Outcome Measure Options AM-PAC 6 Clicks Basic Mobility (PT)  -ER               User Key  (r) = Recorded By, (t) = Taken By, (c) = Cosigned By      Initials Name Provider Type    ER Sonia Fajardo, PT Physical Therapist    Jessica Lazo, RN Registered Nurse                                 Physical Therapy Education       Title: PT OT SLP Therapies (In Progress)       Topic: Physical Therapy (Done)       Point: Mobility training (Done)       Learning Progress Summary             Patient Acceptance, E, VU by ER at 1/3/2024 1053    Acceptance, E,TB, VU,NR by NARAYAN at 1/2/2024 1554    Acceptance, E, VU,NR by MIA at 12/31/2023 1437    Acceptance, E,TB,D, VU,NR by AMELIA at 12/29/2023 1130    Acceptance, E, VU by SHLOMO at 12/27/2023 1554    Acceptance, E,TB, VU by KRISTEN at 12/26/2023 1029    Acceptance, E,TB, VU by  at 12/23/2023 1618    Eager, E,TB,D, VU,NR by AMELIA at 12/22/2023 1700    Acceptance, E, VU by CLARISSA at 12/21/2023 1713                         Point: Home exercise program (Done)       Learning Progress Summary             Patient Acceptance, E, VU by ER at 1/3/2024 1053    Acceptance, E, VU,NR by MIA at 12/31/2023 1437    Acceptance, E,TB,D, VU,NR by AMELIA at 12/29/2023 1130    Acceptance, E,TB, VU by KRISTEN at 12/26/2023 1029    Acceptance, E,TB, VU by  at 12/23/2023 1618    Eager, E,TB,D, VU,NR by AMELIA at 12/22/2023 1700    Acceptance, E, VU by CLARISSA at 12/21/2023 1713                         Point: Body mechanics (Done)       Learning Progress Summary             Patient Acceptance, E VU by ER at  1/3/2024 1053    Acceptance, E,TB, VU,NR by EE at 1/2/2024 1554    Acceptance, E, VU,NR by MIA at 12/31/2023 1437    Acceptance, E,TB,D, VU,NR by AMELIA at 12/29/2023 1130    Acceptance, E, VU by LW at 12/27/2023 1554    Acceptance, E,TB, VU by KRISTEN at 12/26/2023 1029    Acceptance, E,TB, VU by  at 12/23/2023 1618    Eager, E,TB,D, VU,NR by AMELIA at 12/22/2023 1700    Acceptance, E, VU by CLARISSA at 12/21/2023 1713                         Point: Precautions (Done)       Learning Progress Summary             Patient Acceptance, E, VU by ER at 1/3/2024 1053    Acceptance, E,TB, VU,NR by EE at 1/2/2024 1554    Acceptance, E, VU,NR by MIA at 12/31/2023 1437    Acceptance, E,TB,D, VU,NR by AMELIA at 12/29/2023 1130    Acceptance, E, VU by SHLOMO at 12/27/2023 1554    Acceptance, E,TB, VU by KRISTEN at 12/26/2023 1029    Acceptance, E,TB, VU by  at 12/23/2023 1618    Eager, E,TB,D, VU,NR by AMELIA at 12/22/2023 1700    Acceptance, E, VU by CLARISSA at 12/21/2023 1713                                         User Key       Initials Effective Dates Name Provider Type Discipline    RD 06/16/21 -  Mariposa Kennedy, PT Physical Therapist PT    EE 06/16/21 -  Lo Grajeda, PT Physical Therapist PT    AMELIA 03/07/18 -  Rosa Portillo, PTA Physical Therapist Assistant PT    CN 06/16/21 -  Kirsty Mariscal, PT Physical Therapist PT    DB 06/16/21 -  Felipa Weathers, PT Physical Therapist PT    LW 05/08/23 -  Ashley Bertrand, PT Physical Therapist PT     05/24/23 -  Leobardo Weller, PT Physical Therapist PT    ER 10/15/23 -  Sonia Fajardo, PT Physical Therapist PT                  PT Recommendation and Plan     Plan of Care Reviewed With: patient  Outcome Evaluation: Anthony Gallegos is a 78 year old male seen for physical therapy treatment [facility] this morning. Pt. agreeable and performs BM with min A, posterior lean noted while sitting, CGA provided for sitting balance. Pt. performed STS from EOB with RW and Min A, EOB slightly elevated. Pt. able to ambulate  ~25 ft, with some unsteadiness, however no overt LOB. Pt. left [facility] with all needs met and within reach. PT recommending SNF at d/c.     Time Calculation:         PT Charges       Row Name 01/03/24 1100             Time Calculation    Start Time 1014  -ER      Stop Time 1030  -ER      Time Calculation (min) 16 min  -ER      PT Received On 01/03/24  -ER      PT - Next Appointment 01/04/24  -ER         Time Calculation- PT    Total Timed Code Minutes- PT 16 minute(s)  -ER         Timed Charges    17698 - PT Therapeutic Activity Minutes 16  -ER         Total Minutes    Timed Charges Total Minutes 16  -ER       Total Minutes 16  -ER                User Key  (r) = Recorded By, (t) = Taken By, (c) = Cosigned By      Initials Name Provider Type    ER Sonia Fajardo, PT Physical Therapist                  Therapy Charges for Today       Code Description Service Date Service Provider Modifiers Qty    [id number] HC PT THERAPEUTIC ACT EA 15 MIN 1/3/2024 Sonia Fajardo, PT GP 1            PT G-Codes  Outcome Measure Options: AM-PAC 6 Clicks Basic Mobility (PT)  AM-PAC 6 Clicks Score (PT): 18  AM-PAC 6 Clicks Score (OT): 14  Modified Fairview Scale: 4 - Moderately severe disability.  Unable to walk without assistance, and unable to attend to own bodily needs without assistance.  PT Discharge Summary  Anticipated Discharge Disposition (PT): skilled nursing facility    Sonia Fajardo PT  1/3/2024

## 2024-01-03 NOTE — PLAN OF CARE
Barrier for discharge today. Hopefully will discharge tomorrow. Removed brief today r/t pt knowing when he has to use the bathroom, new urinal placed at bedside. Continued to be photosensitive. No acute signs of distress noted. X2 side rails up, Bed in low locked position, call light within reach, chair alarm on.    Problem: Adult Inpatient Plan of Care  Goal: Plan of Care Review  Outcome: Ongoing, Progressing  Goal: Patient-Specific Goal (Individualized)  Outcome: Ongoing, Progressing  Goal: Absence of Hospital-Acquired Illness or Injury  Outcome: Ongoing, Progressing  Intervention: Identify and Manage Fall Risk  Recent Flowsheet Documentation  Taken 1/3/2024 1800 by Jessica Martin RN  Safety Promotion/Fall Prevention:   nonskid shoes/slippers when out of bed   safety round/check completed   clutter free environment maintained  Taken 1/3/2024 1600 by Jessica Martin RN  Safety Promotion/Fall Prevention:   nonskid shoes/slippers when out of bed   safety round/check completed   clutter free environment maintained  Taken 1/3/2024 1400 by Jessica Martin RN  Safety Promotion/Fall Prevention:   nonskid shoes/slippers when out of bed   safety round/check completed   clutter free environment maintained  Taken 1/3/2024 1200 by Jessica Martin RN  Safety Promotion/Fall Prevention:   nonskid shoes/slippers when out of bed   safety round/check completed   clutter free environment maintained  Taken 1/3/2024 1000 by Jessica Martin RN  Safety Promotion/Fall Prevention:   nonskid shoes/slippers when out of bed   safety round/check completed   clutter free environment maintained  Taken 1/3/2024 0800 by Jessica Maritn RN  Safety Promotion/Fall Prevention:   nonskid shoes/slippers when out of bed   safety round/check completed   clutter free environment maintained  Intervention: Prevent Skin Injury  Recent Flowsheet Documentation  Taken 1/3/2024 1800 by Jessica Martin RN  Body Position: position changed  independently  Taken 1/3/2024 1600 by Jessica Martin RN  Body Position: position changed independently  Taken 1/3/2024 1400 by Jessica Martin RN  Body Position: position changed independently  Taken 1/3/2024 1200 by Jessica Martin RN  Body Position: position changed independently  Taken 1/3/2024 1000 by Jessica Martin RN  Body Position: position changed independently  Taken 1/3/2024 0800 by Jessica Martin RN  Body Position: position changed independently  Intervention: Prevent and Manage VTE (Venous Thromboembolism) Risk  Recent Flowsheet Documentation  Taken 1/3/2024 1800 by Jessica Martin RN  Activity Management: up in chair  Taken 1/3/2024 1600 by Jessica Martin RN  Activity Management: up in chair  Taken 1/3/2024 1400 by Jessica Martin RN  Activity Management: up in chair  Taken 1/3/2024 1200 by Jessica Martin RN  Activity Management: up in chair  Taken 1/3/2024 1000 by Jessica Martin RN  Activity Management: up in chair  Taken 1/3/2024 0900 by Jessica Martin RN  VTE Prevention/Management: sequential compression devices off  Range of Motion:   ROM (range of motion) performed   active ROM (range of motion) encouraged  Taken 1/3/2024 0800 by Jessica Martin RN  Activity Management: bedrest  Intervention: Prevent Infection  Recent Flowsheet Documentation  Taken 1/3/2024 1800 by Jessica Martin RN  Infection Prevention:   environmental surveillance performed   hand hygiene promoted   single patient room provided  Taken 1/3/2024 1600 by Jessica Martin RN  Infection Prevention:   environmental surveillance performed   hand hygiene promoted   single patient room provided  Taken 1/3/2024 1400 by Jessica Martin RN  Infection Prevention:   environmental surveillance performed   hand hygiene promoted   single patient room provided  Taken 1/3/2024 1200 by Jessica Martin RN  Infection Prevention:   environmental surveillance performed   hand hygiene promoted   single patient room provided  Taken  1/3/2024 1000 by Jessica Martin RN  Infection Prevention:   environmental surveillance performed   hand hygiene promoted   single patient room provided  Taken 1/3/2024 0800 by Jessica Martin RN  Infection Prevention:   environmental surveillance performed   hand hygiene promoted   single patient room provided  Goal: Optimal Comfort and Wellbeing  Outcome: Ongoing, Progressing  Goal: Readiness for Transition of Care  Outcome: Ongoing, Progressing     Problem: Fall Injury Risk  Goal: Absence of Fall and Fall-Related Injury  Outcome: Ongoing, Progressing  Intervention: Promote Injury-Free Environment  Recent Flowsheet Documentation  Taken 1/3/2024 1800 by Jessica Martin RN  Safety Promotion/Fall Prevention:   nonskid shoes/slippers when out of bed   safety round/check completed   clutter free environment maintained  Taken 1/3/2024 1600 by Jessica Martin RN  Safety Promotion/Fall Prevention:   nonskid shoes/slippers when out of bed   safety round/check completed   clutter free environment maintained  Taken 1/3/2024 1400 by Jessica Martin RN  Safety Promotion/Fall Prevention:   nonskid shoes/slippers when out of bed   safety round/check completed   clutter free environment maintained  Taken 1/3/2024 1200 by Jessica Martin RN  Safety Promotion/Fall Prevention:   nonskid shoes/slippers when out of bed   safety round/check completed   clutter free environment maintained  Taken 1/3/2024 1000 by Jessica Martin RN  Safety Promotion/Fall Prevention:   nonskid shoes/slippers when out of bed   safety round/check completed   clutter free environment maintained  Taken 1/3/2024 0800 by Jessica Martin RN  Safety Promotion/Fall Prevention:   nonskid shoes/slippers when out of bed   safety round/check completed   clutter free environment maintained     Problem: Skin Injury Risk Increased  Goal: Skin Health and Integrity  Outcome: Ongoing, Progressing  Intervention: Optimize Skin Protection  Recent Flowsheet  Documentation  Taken 1/3/2024 1800 by Jessica Martin RN  Head of Bed (HOB) Positioning: HOB elevated  Taken 1/3/2024 1600 by Jessica Martin RN  Head of Bed (HOB) Positioning: HOB elevated  Taken 1/3/2024 1400 by Jessica Martin RN  Head of Bed (HOB) Positioning: HOB elevated  Taken 1/3/2024 1200 by Jessica Martin RN  Head of Bed (HOB) Positioning: HOB elevated  Taken 1/3/2024 1000 by Jessica Martin RN  Head of Bed (HOB) Positioning: HOB elevated  Taken 1/3/2024 0800 by Jessica Martin RN  Head of Bed (HOB) Positioning: HOB elevated  Goal: Skin Health and Integrity  Outcome: Ongoing, Progressing  Intervention: Optimize Skin Protection  Recent Flowsheet Documentation  Taken 1/3/2024 1800 by Jessica Martin RN  Head of Bed (HOB) Positioning: HOB elevated  Taken 1/3/2024 1600 by Jessica Martin RN  Head of Bed (HOB) Positioning: HOB elevated  Taken 1/3/2024 1400 by Jessica Martin RN  Head of Bed (HOB) Positioning: HOB elevated  Taken 1/3/2024 1200 by Jessica Martin RN  Head of Bed (HOB) Positioning: HOB elevated  Taken 1/3/2024 1000 by Jessica Martin RN  Head of Bed (HOB) Positioning: HOB elevated  Taken 1/3/2024 0800 by Jessica Martin RN  Head of Bed (HOB) Positioning: HOB elevated     Problem: Malnutrition  Goal: Improved Nutritional Intake  Outcome: Ongoing, Progressing   Goal Outcome Evaluation:

## 2024-01-03 NOTE — PROGRESS NOTES
Name: Anthony Gallegos ADMIT: 2023   : 1945  PCP: Marco Macedo MD    MRN: [id number] LOS: 12 days   AGE/SEX: 78 y.o. male  ROOM: [facility] [place]/     Subjective   Subjective     He is feeling better this morning.  He remained stable.  Denies new issues or complaints but has not really gotten up and moving this morning.  His biggest concern today is that he is already paid for some of these medications for discharge and would like to either have them or his money back prior to leaving.    Objective   Objective   Vital Signs  Temp:  [98.3 °F (36.8 °C)-99.7 °F (37.6 °C)] 98.8 °F (37.1 °C)  Heart Rate:  [70-86] 75  Resp:  [16] 16  BP: (104-148)/(52-66) 104/55  SpO2:  [98 %-100 %] 98 %  on   ;   Device (Oxygen Therapy): room air  Body mass index is 22.73 kg/m².    Physical Exam  Constitutional:       General: He is not in acute distress.     Appearance: He is ill-appearing. He is not toxic-appearing.      Comments: Chronic ill appearing   HENT:      Head: Normocephalic and atraumatic.   Cardiovascular:      Rate and Rhythm: Normal rate and regular rhythm.   Pulmonary:      Effort: Pulmonary effort is normal. No respiratory distress.      Breath sounds: Normal breath sounds.   Abdominal:      General: Bowel sounds are normal.      Palpations: Abdomen is soft.      Tenderness: There is no abdominal tenderness.   Musculoskeletal:         General: No swelling.   Skin:     General: Skin is warm and dry.   Neurological:      General: No focal deficit present.      Mental Status: He is alert.   Psychiatric:         Mood and Affect: Mood normal.         Behavior: Behavior normal.     Results Review  I reviewed the patient's new clinical results.              Lab Results   Component Value Date    ANIONGAP 6.0 2023     Estimated Creatinine Clearance: 99.9 mL/min (A) (by C-G formula based on SCr of 0.7 mg/dL (L)).   Lab Results   Component Value Date    EGFR 93.7 2023                   No results found for:  "\"HGBA1C\", \"POCGLU\"      No radiology results for the last day    Scheduled Meds  acetaminophen, 1,000 mg, Oral, TID  [Held by provider] atenolol, 25 mg, Oral, Q24H  enoxaparin, 40 mg, Subcutaneous, Q24H  Lidocaine, 2 patch, Transdermal, Q24H  methotrexate, 2.5 mg, Oral, Once per day on Wed Thu  pantoprazole, 40 mg, Oral, Q AM  senna-docusate sodium, 2 tablet, Oral, BID  sodium chloride, 10 mL, Intravenous, Q12H  tamsulosin, 0.4 mg, Oral, Nightly    Continuous Infusions   PRN Meds    acetaminophen **OR** acetaminophen **OR** acetaminophen    senna-docusate sodium **AND** polyethylene glycol **AND** bisacodyl **AND** bisacodyl    calcium carbonate    Calcium Replacement - Follow Nurse / BPA Driven Protocol    Magnesium Standard Dose Replacement - Follow Nurse / BPA Driven Protocol    melatonin    muscle rub    ondansetron ODT **OR** ondansetron    Phosphorus Replacement - Follow Nurse / BPA Driven Protocol    Potassium Replacement - Follow Nurse / BPA Driven Protocol    [COMPLETED] Insert Peripheral IV **AND** sodium chloride    sodium chloride    sodium chloride     Diet  Diet: Cardiac Diets; Healthy Heart (2-3 Na+); Texture: Regular Texture (IDDSI 7); Fluid Consistency: Thin (IDDSI 0)    I have personally reviewed:  [x]  Medications  []  Laboratory   []  Microbiology   []  Radiology   []  EKG/Telemetry   []  Cardiology/Vascular   []  Pathology   []  Records      Assessment/Plan     Active Hospital Problems    Diagnosis  POA    **Generalized weakness [R53.1]  Yes    Severe malnutrition [E43]  Yes    Immobility [Z74.09]  Yes    Fall from bed [W06.XXXA]  Yes    Tetrahydrocannabinol (THC) use disorder, mild, abuse [F12.10]  Yes    Generalized pain [R52]  Yes     [Z63.4]  Yes    Grief [F43.21]  Yes    DISH (disseminated idiopathic skeletal hyperostosis) [M48.10]  Yes    Hypertension [I10]  Yes    Ankylosing spondylitis of cervical region [M45.2]  Yes      Resolved Hospital Problems    Diagnosis Date Resolved POA "    Urine retention [R33.9] 12/24/2023 Yes    Constipation [K59.00] 12/24/2023 Yes     78 y.o. male admitted with generalized weakness    Generalized weakness  Immobility  Fall from bed  Multifactorial secondary to age and chronic comorbidities  PT/OT following     Generalized pain  Disseminated idiopathic skeletal hyperostosis  Ankylosing spondylitis  X-ray of lumbar spine with diffuse ankylosis without any obvious injury or fracture.  methotrexate     Urinary retention  Constipation  catheter is out. Flomax added.  Urology evaluated     Elevated CK, improved  Lactic acidosis, resolved  related to dehydration     Hypertension  Still with some low normal BPs and heart rate but not as low today (no SBPs in the 90s). Metoprolol remains on hold    DVT prophylaxis  Lovenox 40 mg SC daily    Discharge  Awaiting SNF  Expected Discharge Date: 1/3/2024; Expected Discharge Time:     Discussed with patient and nursing staff    Shola Haque MD  [facility]  01/03/24

## 2024-01-03 NOTE — PLAN OF CARE
Goal Outcome Evaluation:   A&Ox4, VSS, RA, incontinenet of bowel & bladder w/ brief on, pt did not get out of bed this shift, helping w/ turns but pt rolls over in bed on his own as well, took meds whole w/ water this shift w/ no issues, awaiting pre-cert from [facility], pt resting comfortably this morning

## 2024-01-03 NOTE — PLAN OF CARE
Goal Outcome Evaluation:  Plan of Care Reviewed With: patient           Outcome Evaluation: Anthony Gallegos is a 78 year old male seen for physical therapy treatment nick this morning. Pt. agreeable and performs BM with min A, posterior lean noted while sitting, CGA provided for sitting balance. Pt. performed STS from EOB with RW and Min A, EOB slightly elevated. Pt. able to ambulate ~25 ft, with some unsteadiness, however no overt LOB. Pt. left [facility] with all needs met and within reach. PT recommending SNF at d/c.      Anticipated Discharge Disposition (PT): skilled nursing facility

## 2024-01-04 PROCEDURE — 63710000001 METHOTREXATE 2.5 MG TABLET: Performed by: INTERNAL MEDICINE

## 2024-01-04 PROCEDURE — 25010000002 ENOXAPARIN PER 10 MG: Performed by: HOSPITALIST

## 2024-01-04 RX ORDER — PANTOPRAZOLE SODIUM 40 MG/1
40 TABLET, DELAYED RELEASE ORAL
Status: ON HOLD
Start: 2024-01-05

## 2024-01-04 RX ADMIN — ACETAMINOPHEN 1000 MG: 500 TABLET ORAL at 09:26

## 2024-01-04 RX ADMIN — METHOTREXATE 2.5 MG: 2.5 TABLET ORAL at 09:49

## 2024-01-04 RX ADMIN — SENNOSIDES AND DOCUSATE SODIUM 2 TABLET: 50; 8.6 TABLET ORAL at 20:47

## 2024-01-04 RX ADMIN — TAMSULOSIN HYDROCHLORIDE 0.4 MG: 0.4 CAPSULE ORAL at 20:47

## 2024-01-04 RX ADMIN — ACETAMINOPHEN 1000 MG: 500 TABLET ORAL at 20:47

## 2024-01-04 RX ADMIN — ACETAMINOPHEN 1000 MG: 500 TABLET ORAL at 15:26

## 2024-01-04 RX ADMIN — ENOXAPARIN SODIUM 40 MG: 100 INJECTION SUBCUTANEOUS at 13:25

## 2024-01-04 RX ADMIN — SENNOSIDES AND DOCUSATE SODIUM 2 TABLET: 50; 8.6 TABLET ORAL at 09:26

## 2024-01-04 RX ADMIN — PANTOPRAZOLE SODIUM 40 MG: 40 TABLET, DELAYED RELEASE ORAL at 05:56

## 2024-01-04 NOTE — CASE MANAGEMENT/SOCIAL WORK
Continued Stay Note  [facility]     Patient Name: Anthony Gallegos  MRN: [id number]  Today's Date: 1/4/2024    Admit Date: 12/20/2023    Plan: [facility], pending pre-cert   Discharge Plan       Row Name 01/04/24 1336       Plan    Plan [facility], pending pre-cert    Plan Comments As of 9:23AM pre-cert still pending for [facility]. CCP to follow. CD, CSW.                   Discharge Codes    No documentation.                 Expected Discharge Date and Time       Expected Discharge Date Expected Discharge Time    Jf 3, 2024

## 2024-01-04 NOTE — PROGRESS NOTES
Enter Query Response Below      Query Response: Lactic acidosis-clinically insignificant              If applicable, please update the problem list.   If you have any questions about this query contact me at: [email or web address]     Dr. Haque,    Patient admitted with weakness and pain was also diagnosed with lactic acidosis. 12/20 Lactate 2.5 with repeat Lactate levels of 2.4 and 1.8. Treatment included IVF and repeat labs.    Please clarify the following:    Lactic acidosis-clinically significant  Lactic acidosis-clinically insignificant  Other- specify ___  Unable to determine      By submitting this query, we are merely seeking further clarification of documentation to accurately reflect all conditions that you are monitoring, evaluating, treating or that extend the hospitalization or utilize additional resources of care. Please utilize your independent clinical judgment when addressing the question(s) above.     This query and your response, once completed, will be entered into the legal medical record.    Sincerely,  Portia Ruano RN  Clinical Documentation Integrity Program

## 2024-01-04 NOTE — PLAN OF CARE
Goal Outcome Evaluation:  Plan of Care Reviewed With: patient        Progress: no change  Outcome Evaluation: Pt alert and oriented x4. VSS on room air. Pt has rested well in bed throughout shift, no overnight events.Pending placement to SNF. No needs voiced at this time. Call light and personal items within reach.

## 2024-01-05 PROCEDURE — 25010000002 ENOXAPARIN PER 10 MG: Performed by: HOSPITALIST

## 2024-01-05 PROCEDURE — 97116 GAIT TRAINING THERAPY: CPT

## 2024-01-05 PROCEDURE — 97530 THERAPEUTIC ACTIVITIES: CPT

## 2024-01-05 RX ADMIN — ACETAMINOPHEN 1000 MG: 500 TABLET ORAL at 14:07

## 2024-01-05 RX ADMIN — SENNOSIDES AND DOCUSATE SODIUM 2 TABLET: 50; 8.6 TABLET ORAL at 08:32

## 2024-01-05 RX ADMIN — TAMSULOSIN HYDROCHLORIDE 0.4 MG: 0.4 CAPSULE ORAL at 21:30

## 2024-01-05 RX ADMIN — ENOXAPARIN SODIUM 40 MG: 100 INJECTION SUBCUTANEOUS at 14:07

## 2024-01-05 RX ADMIN — PANTOPRAZOLE SODIUM 40 MG: 40 TABLET, DELAYED RELEASE ORAL at 08:32

## 2024-01-05 RX ADMIN — ACETAMINOPHEN 1000 MG: 500 TABLET ORAL at 21:30

## 2024-01-05 RX ADMIN — ACETAMINOPHEN 1000 MG: 500 TABLET ORAL at 08:32

## 2024-01-05 NOTE — THERAPY TREATMENT NOTE
Patient Name: Anthony Gallegos  : 1945    MRN: [id number]                              Today's Date: 2024       Admit Date: 2023    Visit Dx:     ICD-10-CM ICD-9-CM   1. Muscle pain  M79.10 729.1   2. Dizziness  R42 780.4   3. Immobility  Z74.09 799.89   4. Fall, initial encounter  W19.XXXA E888.9   5. Elevated CK  R74.8 790.5   6. Arthralgia, unspecified joint  M25.50 719.40   7. Ankylosing spondylitis of cervical region  M45.2 720.0     Patient Active Problem List   Diagnosis    Ankylosing spondylitis of cervical region    Recent unexplained weight loss    Abnormal serum lipase level    Abnormal serum level of amylase    Hypertension    Boil    Immobility    Fall from bed    Tetrahydrocannabinol (THC) use disorder, mild, abuse    Generalized pain        Grief    DISH (disseminated idiopathic skeletal hyperostosis)    Generalized weakness    Severe malnutrition     Past Medical History:   Diagnosis Date    Abscess of scrotum     Arthritis     AS (ankylosing spondylitis)     Hypertension     Tetrahydrocannabinol (THC) use disorder, mild, abuse 2023    Uveitis      Past Surgical History:   Procedure Laterality Date    COLONOSCOPY      ROTATOR CUFF REPAIR Right     TOTAL HIP ARTHROPLASTY Left       General Information       Row Name 24 0954          Physical Therapy Time and Intention    Document Type therapy note (daily note)  -EB     Mode of Treatment individual therapy;physical therapy  -EB       Row Name 24 0954          General Information    Patient Profile Reviewed yes  -EB     Existing Precautions/Restrictions fall  -EB       Row Name 24 0954          Cognition    Orientation Status (Cognition) oriented x 4  -EB       Row Name 24 0954          Safety Issues, Functional Mobility    Impairments Affecting Function (Mobility) balance;endurance/activity tolerance;strength  -EB               User Key  (r) = Recorded By, (t) = Taken By, (c) = Cosigned By       Initials Name Provider Type    Cady Montenegro PTA Physical Therapist Assistant                   Mobility       Row Name 01/05/24 0954          Bed Mobility    Comment, (Bed Mobility) NT-UIC  -EB       Row Name 01/05/24 0954          Sit-Stand Transfer    Sit-Stand Kings (Transfers) minimum assist (75% patient effort)  -EB     Assistive Device (Sit-Stand Transfers) walker, 4-wheeled  -EB     Comment, (Sit-Stand Transfer) STS X2 from recliner. Cues for hand placement, slight posterior lean needing assist to correct.  -EB       Row Name 01/05/24 0954          Gait/Stairs (Locomotion)    Kings Level (Gait) minimum assist (75% patient effort)  -EB     Assistive Device (Gait) walker, front-wheeled  -EB     Distance in Feet (Gait) 30ft  -EB     Deviations/Abnormal Patterns (Gait) ranjit decreased;gait speed decreased;stride length decreased  -EB     Bilateral Gait Deviations forward flexed posture;heel strike decreased  -EB     Right Sided Gait Deviations decreased knee extension  -EB     Comment, (Gait/Stairs) safety cues for pt to complete turns and feel chair on back legs before trying to sit.  A little unsteady.  -EB               User Key  (r) = Recorded By, (t) = Taken By, (c) = Cosigned By      Initials Name Provider Type    Cady Montenegro PTA Physical Therapist Assistant                   Obj/Interventions       Row Name 01/05/24 0958          Motor Skills    Therapeutic Exercise --  BLE: Seated Von gary (X10)  -EB               User Key  (r) = Recorded By, (t) = Taken By, (c) = Cosigned By      Initials Name Provider Type    Cady Montenegro PTA Physical Therapist Assistant                   Goals/Plan    No documentation.                  Clinical Impression       Row Name 01/05/24 0958          Plan of Care Review    Plan of Care Reviewed With patient  -EB     Progress no change  -EB     Outcome Evaluation Pt seen for PT treatment today. Pt continues to need Malgorzata with STS transfers  from recliner due to pt leaning posteriorly and Malgorzata need to assist to correct. Pt ambulated 30ft with rwx, Malgorzata. Pt a little unsteady and needs safety cues to complete turns and feel chair on back of legs before trying to sit. Recommend SNF at d/c due to pt needing to improve strength and balance to decrease falls risk and be able to return home alone safely.  -EB       Row Name 01/05/24 0958          Therapy Assessment/Plan (PT)    Therapy Frequency (PT) 5 times/wk  -EB       Row Name 01/05/24 0958          Positioning and Restraints    Pre-Treatment Position sitting in chair/recliner  -EB     Post Treatment Position chair  -EB     In Chair sitting;call light within reach;encouraged to call for assist;exit alarm on  -EB               User Key  (r) = Recorded By, (t) = Taken By, (c) = Cosigned By      Initials Name Provider Type    Cady Montenegro PTA Physical Therapist Assistant                   Outcome Measures       Row Name 01/05/24 1054          How much help from another person do you currently need...    Turning from your back to your side while in flat bed without using bedrails? 3  -EB     Moving from lying on back to sitting on the side of a flat bed without bedrails? 3  -EB     Moving to and from a bed to a chair (including a wheelchair)? 3  -EB     Standing up from a chair using your arms (e.g., wheelchair, bedside chair)? 3  -EB     Climbing 3-5 steps with a railing? 2  -EB     To walk in hospital room? 3  -EB     AM-PAC 6 Clicks Score (PT) 17  -EB     Highest Level of Mobility Goal 5 --> Static standing  -EB               User Key  (r) = Recorded By, (t) = Taken By, (c) = Cosigned By      Initials Name Provider Type    Cady Montenegro PTA Physical Therapist Assistant                                 Physical Therapy Education       Title: PT OT SLP Therapies (In Progress)       Topic: Physical Therapy (Done)       Point: Mobility training (Done)       Learning Progress Summary             Patient  Acceptance, E, VU by EB at 1/5/2024 1054    Acceptance, E, VU by ER at 1/3/2024 1053    Acceptance, E,TB, VU,NR by NARAYAN at 1/2/2024 1554    Acceptance, E, VU,NR by MIA at 12/31/2023 1437    Acceptance, E,TB,D, VU,NR by AMELIA at 12/29/2023 1130    Acceptance, E, VU by SHLOMO at 12/27/2023 1554    Acceptance, E,TB, VU by KRISTEN at 12/26/2023 1029    Acceptance, E,TB, VU by  at 12/23/2023 1618    Eager, E,TB,D, VU,NR by AMELIA at 12/22/2023 1700    Acceptance, E, VU by CLARISSA at 12/21/2023 1713                         Point: Home exercise program (Done)       Learning Progress Summary             Patient Acceptance, E, VU by EB at 1/5/2024 1054    Acceptance, E, VU by ER at 1/3/2024 1053    Acceptance, E, VU,NR by MIA at 12/31/2023 1437    Acceptance, E,TB,D, VU,NR by AMELIA at 12/29/2023 1130    Acceptance, E,TB, VU by KRISTEN at 12/26/2023 1029    Acceptance, E,TB, VU by  at 12/23/2023 1618    Eager, E,TB,D, VU,NR by AMELIA at 12/22/2023 1700    Acceptance, E, VU by CLARISSA at 12/21/2023 1713                         Point: Body mechanics (Done)       Learning Progress Summary             Patient Acceptance, E, VU by EB at 1/5/2024 1054    Acceptance, E, VU by ER at 1/3/2024 1053    Acceptance, E,TB, VU,NR by EE at 1/2/2024 1554    Acceptance, E, VU,NR by MIA at 12/31/2023 1437    Acceptance, E,TB,D, VU,NR by AMELIA at 12/29/2023 1130    Acceptance, E, VU by SHLOMO at 12/27/2023 1554    Acceptance, E,TB, VU by KRISTEN at 12/26/2023 1029    Acceptance, E,TB, VU by  at 12/23/2023 1618    Eager, E,TB,D, VU,NR by AMELIA at 12/22/2023 1700    Acceptance, E, VU by DB at 12/21/2023 1713                         Point: Precautions (Done)       Learning Progress Summary             Patient Acceptance, E, VU by EB at 1/5/2024 1054    Acceptance, E, VU by ER at 1/3/2024 1053    Acceptance, E,TB, VU,NR by NARAYAN at 1/2/2024 1554    Acceptance, E, VU,NR by CN at 12/31/2023 1437    Acceptance, E,TB,D, VU,NR by AMELIA at 12/29/2023 1130    Acceptance, E, VU by SHLOMO at 12/27/2023 1554     Acceptance, E,TB, VU by KRISTEN at 12/26/2023 1029    Acceptance, E,TB, VU by  at 12/23/2023 1618    Eager, E,TB,D, VU,NR by AMELIA at 12/22/2023 1700    Acceptance, E, VU by CLARISSA at 12/21/2023 1713                                         User Key       Initials Effective Dates Name Provider Type Discipline    RD 06/16/21 -  Mariposa Kennedy, PT Physical Therapist PT    EE 06/16/21 -  Lo Grajeda, PT Physical Therapist PT    AMELIA 03/07/18 -  Rosa Portillo, PTA Physical Therapist Assistant PT    CN 06/16/21 -  Kirsty Mariscal, PT Physical Therapist PT    EB 02/14/23 -  Cady Tanner PTA Physical Therapist Assistant PT    CLARISSA 06/16/21 -  Felipa Weathers, PT Physical Therapist PT    LW 05/08/23 -  Ashley Bertrand, PT Physical Therapist PT     05/24/23 -  Leobardo Weller, PT Physical Therapist PT    ER 10/15/23 -  Sonia Fajardo, PT Physical Therapist PT                  PT Recommendation and Plan     Plan of Care Reviewed With: patient  Progress: no change  Outcome Evaluation: Pt seen for PT treatment today. Pt continues to need Malgorzata with STS transfers from recliner due to pt leaning posteriorly and Malgorzata need to assist to correct. Pt ambulated 30ft with rwx, Malgorzata. Pt a little unsteady and needs safety cues to complete turns and feel chair on back of legs before trying to sit. Recommend SNF at d/c due to pt needing to improve strength and balance to decrease falls risk and be able to return home alone safely.     Time Calculation:         PT Charges       Row Name 01/05/24 0952             Time Calculation    Start Time 0902  -EB      Stop Time 0925  -EB      Time Calculation (min) 23 min  -EB      PT Received On 01/05/24  -EB      PT - Next Appointment 01/08/24  -EB         Time Calculation- PT    Total Timed Code Minutes- PT 23 minute(s)  -EB                User Key  (r) = Recorded By, (t) = Taken By, (c) = Cosigned By      Initials Name Provider Type    EB Cady Tanner PTA Physical Therapist Assistant                   Therapy Charges for Today       Code Description Service Date Service Provider Modifiers Qty    14387230267 HC GAIT TRAINING EA 15 MIN 1/5/2024 Cady Tanner, TULIO GP 1    17860983657 HC PT THERAPEUTIC ACT EA 15 MIN 1/5/2024 Cady Tanner, TULIO GP 1            PT G-Codes  Outcome Measure Options: AM-PAC 6 Clicks Basic Mobility (PT)  AM-PAC 6 Clicks Score (PT): 17  AM-PAC 6 Clicks Score (OT): 14  Modified Stanwood Scale: 4 - Moderately severe disability.  Unable to walk without assistance, and unable to attend to own bodily needs without assistance.       Cady Tanner PTA  1/5/2024

## 2024-01-05 NOTE — PLAN OF CARE
Goal Outcome Evaluation:  Plan of Care Reviewed With: patient        Progress: no change  Outcome Evaluation: Pt seen for PT treatment today. Pt continues to need Malgorzata with STS transfers from recliner due to pt leaning posteriorly and Malgorzata need to assist to correct. Pt ambulated 30ft with rwx, Malgorzata. Pt a little unsteady and needs safety cues to complete turns and feel chair on back of legs before trying to sit. Recommend SNF at d/c due to pt needing to improve strength and balance to decrease falls risk and be able to return home alone safely.

## 2024-01-05 NOTE — CASE MANAGEMENT/SOCIAL WORK
Continued Stay Note  [facility]     Patient Name: Anthony Gallegos  MRN: [id number]  Today's Date: 1/5/2024    Admit Date: 12/20/2023    Plan: [facility], pending pre-cert   Discharge Plan       Row Name 01/05/24 1341       Plan    Plan [facility], pending pre-cert    Plan Comments Still waiting for pre-cert. Vociemail left for liasion regarding update on pre-cert status. CCP to follow. CD, CSW.                   Discharge Codes    No documentation.                 Expected Discharge Date and Time       Expected Discharge Date Expected Discharge Time    Jf 3, 2024

## 2024-01-05 NOTE — PLAN OF CARE
Pt has an uneventful day. Sat in the chair for most of the day. Continued to urinate in the brief although he is continent. Reinforced and reeducated the need to not urinate in the brief and to use the urinal or call to be helped to the bathroom. No acute signs of distress noted. X2 side rails up, bed in low locked position, call light within reach.      Problem: Adult Inpatient Plan of Care  Goal: Plan of Care Review  Outcome: Ongoing, Progressing  Goal: Patient-Specific Goal (Individualized)  Outcome: Ongoing, Progressing  Goal: Absence of Hospital-Acquired Illness or Injury  Outcome: Ongoing, Progressing  Intervention: Identify and Manage Fall Risk  Recent Flowsheet Documentation  Taken 1/4/2024 1800 by Jessica Martin RN  Safety Promotion/Fall Prevention:   nonskid shoes/slippers when out of bed   safety round/check completed   clutter free environment maintained  Taken 1/4/2024 1600 by Jessica Martin RN  Safety Promotion/Fall Prevention:   nonskid shoes/slippers when out of bed   safety round/check completed   clutter free environment maintained  Taken 1/4/2024 1400 by Jessica Martin RN  Safety Promotion/Fall Prevention:   nonskid shoes/slippers when out of bed   safety round/check completed   clutter free environment maintained  Taken 1/4/2024 1200 by Jessica Martin RN  Safety Promotion/Fall Prevention:   nonskid shoes/slippers when out of bed   safety round/check completed   clutter free environment maintained  Taken 1/4/2024 1000 by Jessica Martin RN  Safety Promotion/Fall Prevention:   nonskid shoes/slippers when out of bed   safety round/check completed   clutter free environment maintained  Taken 1/4/2024 0800 by Jessica Martin RN  Safety Promotion/Fall Prevention:   nonskid shoes/slippers when out of bed   safety round/check completed  Intervention: Prevent Skin Injury  Recent Flowsheet Documentation  Taken 1/4/2024 1800 by Jessica Martin RN  Body Position: position changed  independently  Taken 1/4/2024 1600 by Jessica Martin RN  Body Position: position changed independently  Taken 1/4/2024 1400 by Jessica Martin RN  Body Position: position changed independently  Taken 1/4/2024 1200 by Jessica Martin RN  Body Position: position changed independently  Taken 1/4/2024 1000 by Jessica Martin RN  Body Position: position changed independently  Taken 1/4/2024 0800 by Jessica Martin RN  Body Position: position changed independently  Intervention: Prevent and Manage VTE (Venous Thromboembolism) Risk  Recent Flowsheet Documentation  Taken 1/4/2024 1800 by Jessica Martin RN  Activity Management: up in chair  Taken 1/4/2024 1600 by Jessica Martin RN  Activity Management: up in chair  Taken 1/4/2024 1400 by Jessica Martin RN  Activity Management: up in chair  Taken 1/4/2024 1200 by Jessica Martin RN  Activity Management: up in chair  Taken 1/4/2024 1000 by Jessica Martin RN  Activity Management: up ad cassia  Taken 1/4/2024 0930 by Jessica Martin RN  VTE Prevention/Management:   sequential compression devices off   patient refused intervention  Range of Motion:   active ROM (range of motion) encouraged   ROM (range of motion) performed  Taken 1/4/2024 0800 by Jessica Martin RN  Activity Management: up ad cassia  Intervention: Prevent Infection  Recent Flowsheet Documentation  Taken 1/4/2024 1800 by Jessica Martin RN  Infection Prevention:   environmental surveillance performed   hand hygiene promoted   single patient room provided  Taken 1/4/2024 1600 by Jessica Martin RN  Infection Prevention:   environmental surveillance performed   hand hygiene promoted   single patient room provided  Taken 1/4/2024 1400 by Jessica Martin RN  Infection Prevention:   environmental surveillance performed   hand hygiene promoted   single patient room provided  Taken 1/4/2024 1200 by Jessica Martin RN  Infection Prevention:   environmental surveillance performed   hand hygiene promoted    single patient room provided  Taken 1/4/2024 1000 by Jessica Martin RN  Infection Prevention:   environmental surveillance performed   hand hygiene promoted   single patient room provided  Taken 1/4/2024 0800 by Jessica Martin RN  Infection Prevention:   environmental surveillance performed   hand hygiene promoted   single patient room provided  Goal: Optimal Comfort and Wellbeing  Outcome: Ongoing, Progressing  Goal: Readiness for Transition of Care  Outcome: Ongoing, Progressing     Problem: Fall Injury Risk  Goal: Absence of Fall and Fall-Related Injury  Outcome: Ongoing, Progressing  Intervention: Promote Injury-Free Environment  Recent Flowsheet Documentation  Taken 1/4/2024 1800 by Jessica Martin RN  Safety Promotion/Fall Prevention:   nonskid shoes/slippers when out of bed   safety round/check completed   clutter free environment maintained  Taken 1/4/2024 1600 by Jessica Martin RN  Safety Promotion/Fall Prevention:   nonskid shoes/slippers when out of bed   safety round/check completed   clutter free environment maintained  Taken 1/4/2024 1400 by Jessica Martin RN  Safety Promotion/Fall Prevention:   nonskid shoes/slippers when out of bed   safety round/check completed   clutter free environment maintained  Taken 1/4/2024 1200 by Jessica Martin RN  Safety Promotion/Fall Prevention:   nonskid shoes/slippers when out of bed   safety round/check completed   clutter free environment maintained  Taken 1/4/2024 1000 by Jessica Martin RN  Safety Promotion/Fall Prevention:   nonskid shoes/slippers when out of bed   safety round/check completed   clutter free environment maintained  Taken 1/4/2024 0800 by Jessica Martin RN  Safety Promotion/Fall Prevention:   nonskid shoes/slippers when out of bed   safety round/check completed     Problem: Skin Injury Risk Increased  Goal: Skin Health and Integrity  Outcome: Ongoing, Progressing  Intervention: Optimize Skin Protection  Recent Flowsheet  Documentation  Taken 1/4/2024 1800 by Jessica Martin RN  Head of Bed (HOB) Positioning: HOB elevated  Taken 1/4/2024 1600 by Jessica Martin RN  Head of Bed (HOB) Positioning: HOB elevated  Taken 1/4/2024 1400 by eJssica Martin RN  Head of Bed (HOB) Positioning: HOB elevated  Taken 1/4/2024 1200 by Jessica Martin RN  Head of Bed (HOB) Positioning: HOB elevated  Taken 1/4/2024 1000 by Jessica Martin RN  Head of Bed (HOB) Positioning: HOB elevated  Taken 1/4/2024 0800 by Jessica Martin RN  Head of Bed (HOB) Positioning: HOB elevated  Goal: Skin Health and Integrity  Outcome: Ongoing, Progressing  Intervention: Optimize Skin Protection  Recent Flowsheet Documentation  Taken 1/4/2024 1800 by Jessica Martin RN  Head of Bed (HOB) Positioning: HOB elevated  Taken 1/4/2024 1600 by Jessica Martin RN  Head of Bed (HOB) Positioning: HOB elevated  Taken 1/4/2024 1400 by Jessica Martin RN  Head of Bed (HOB) Positioning: HOB elevated  Taken 1/4/2024 1200 by Jessica Martin RN  Head of Bed (HOB) Positioning: HOB elevated  Taken 1/4/2024 1000 by Jessica Martin RN  Head of Bed (HOB) Positioning: HOB elevated  Taken 1/4/2024 0800 by Jessica Martin RN  Head of Bed (HOB) Positioning: HOB elevated     Problem: Malnutrition  Goal: Improved Nutritional Intake  Outcome: Ongoing, Progressing   Goal Outcome Evaluation:

## 2024-01-05 NOTE — DISCHARGE SUMMARY
Patient Name: Anthony Gallegos  : 1945  MRN: [id number]    Date of Admission: 2023  Date of Discharge:  2024  Primary Care Physician: Marco Macedo MD      Chief Complaint:   Leg Pain      Discharge Diagnoses     Active Hospital Problems    Diagnosis  POA   • **Generalized weakness [R53.1]  Yes   • Severe malnutrition [E43]  Yes   • Immobility [Z74.09]  Yes   • Fall from bed [W06.XXXA]  Yes   • Tetrahydrocannabinol (THC) use disorder, mild, abuse [F12.10]  Yes   • Generalized pain [R52]  Yes   •  [Z63.4]  Yes   • Grief [F43.21]  Yes   • DISH (disseminated idiopathic skeletal hyperostosis) [M48.10]  Yes   • Hypertension [I10]  Yes   • Ankylosing spondylitis of cervical region [M45.2]  Yes      Resolved Hospital Problems    Diagnosis Date Resolved POA   • Urine retention [R33.9] 2023 Yes   • Constipation [K59.00] 2023 Yes        Hospital Course     Mr. Gallegos is a 78 y.o. male with a history of DISH, hypertension, ankylosing spondylitis and impaired mobility who presented to [facility] initially complaining of leg pain.  Please see the admitting history and physical for further details.  He was found to have generalized weakness following a fall with constipation and urinary retention and was admitted to the hospital for further evaluation and treatment.  He was admitted to the hospital hydrated and treated.  His urinary retention resolved after Flomax was initiated and plans to follow-up with urology in the outpatient setting.  Physical therapy worked with the patient here in the hospital and recommendations are for discharge to skilled nursing facility.  He is doing well on p.o. medications and pain and symptoms are managed.  His hospitalization has been prolonged due to delays from the insurance company and the holidays.  He is stable to discharge once his pre-CERT is obtained.    Day of Discharge     Subjective:  No new issues or complaints today.  Sitting up  in the bed no distress    Physical Exam:  Temp:  [98.4 °F (36.9 °C)-100.1 °F (37.8 °C)] 98.4 °F (36.9 °C)  Heart Rate:  [74-76] 74  Resp:  [18] 18  BP: (108-121)/(55-68) 121/55  Body mass index is 24.22 kg/m².  Physical Exam  Constitutional:       General: He is not in acute distress.     Appearance: Normal appearance. He is ill-appearing.      Comments: Chronic ill-appearing   Cardiovascular:      Rate and Rhythm: Normal rate and regular rhythm.   Pulmonary:      Effort: No respiratory distress.      Breath sounds: Normal breath sounds.   Abdominal:      General: Bowel sounds are normal. There is no distension.      Palpations: Abdomen is soft.   Neurological:      Mental Status: He is alert.         Consultants     Consult Orders (all) (From admission, onward)       Start     Ordered    12/24/23 1607  Inpatient Case Management  Consult  Once        Provider:  (Not yet assigned)    12/24/23 1606    12/24/23 1101  Inpatient Case Management  Consult  Once,   Status:  Canceled        Provider:  (Not yet assigned)    12/24/23 1100    12/21/23 1910  Inpatient Urology Consult  Once        Specialty:  Urology  Provider:  Walter Jimenez Jr., MD    12/21/23 1911 12/20/23 1747  Inpatient Access Center Consult  Once        Provider:  (Not yet assigned)    12/20/23 1747    12/20/23 1742  Inpatient Spiritual Care Consult  Once        Provider:  (Not yet assigned)    12/20/23 1747    12/20/23 1742  Inpatient Nutrition Consult  Once        Comments: Per pt   Provider:  (Not yet assigned)    12/20/23 1747    12/20/23 1741  Inpatient Consult to Case Management   Once        Provider:  (Not yet assigned)    12/20/23 1747    12/20/23 1427  [facility] (on-call MD unless specified) Details  Once,   Status:  Canceled        Specialty:  Hospitalist  Provider:  (Not yet assigned)    12/20/23 1426                  Procedures     * Surgery not found *    Imaging Results (All)       Procedure  Component Value Units Date/Time    XR Foot 3+ View Left [[id number]] Collected: 12/20/23 2218     Updated: 12/21/23 1430    Narrative:      LEFT FOOT     HISTORY: Left foot pain, swelling.     COMPARISON: None.     FINDINGS: 3 views of the left foot were obtained. The study is limited  somewhat by patient positioning. There is no evidence of fracture or  dislocation. There is no evidence of periosteal reaction.     This report was finalized on 12/21/2023 2:27 PM by Dr. Shola Mccracken M.D on Workstation: [id number]       XR Spine Lumbar Complete 4+VW [[id number]] Collected: 12/21/23 1228     Updated: 12/21/23 1238    Narrative:      XR SPINE LUMBAR COMPLETE 4+VW-     INDICATION: Ankylosing spondylitis post fall     COMPARISON: CT abdomen pelvis 12/20/2023       Impression:      Ankylosis throughout the lumbar spine and bilateral  sacroiliac joints consistent with known ankylosing spondylitis. No  subluxation. Vertebral body heights are maintained. No appreciable  interruption of the bridging syndesmophytes. If there is ongoing  clinical concern for fracture, given background of extensive ankylosing  spondylitis limiting evaluation, consider further evaluation with CT.     This report was finalized on 12/21/2023 12:35 PM by Dr. Landon Vaca M.D on Workstation: [id number]       CT Head Without Contrast [[id number]] Collected: 12/20/23 1358     Updated: 12/20/23 1750    Narrative:      EMERGENCY CT SCAN OF THE HEAD WITHOUT CONTRAST ON 12/20/2023     CLINICAL HISTORY: Dizziness. The patient personally reports dizziness  and syncope, also has some abdominal pain and weakness.     TECHNIQUE: Spiral CT images were obtained from the base of the skull to  the vertex without intravenous contrast. The images were reformatted and  submitted in 3 mm thick axial, sagittal and coronal CT sections with  brain algorithm.     There are no prior head CTs or MRIs of the brain for comparison.     FINDINGS: There is some patchy low-density  in the periventricular white  matter most pronounced in the frontal periventricular white matter with  some extension into the subcortical white matter consistent with  mild-to-moderate small vessel disease. The remainder of the brain  parenchyma is normal in attenuation. There is diffuse cerebral atrophy.  The lateral and third ventricles are upper limits of normal in size. I  see no mass effect, no midline shift and no extra-axial fluid  collections are identified. There is no evidence of acute intracranial  hemorrhage. The calvarium and the skull base are normal in appearance.  The paranasal sinuses, mastoid air cells and middle ear cavities are  clear.  The visualized superior halves of the orbits are normal in  appearance. There are osteoarthritic changes in the temporomandibular  joints with marginal spurring off the mandibular heads bilaterally.  There are arthritic changes at the atlantoaxial articulation.       Impression:      1. No convincing acute intracranial abnormality is identified.  2. There is mild-to-moderate small vessel disease in the cerebral white  matter and diffuse cerebral atrophy and osteoarthritic changes at the  temporomandibular joints bilaterally. The remainder the head CT is  normal and the etiology of this patient's dizziness is not established  on this exam. If there remains any clinical suspicion of an acute stroke  causing the dizziness, an MRI of the brain could be obtained for more  complete assessment.     Radiation dose reduction techniques were utilized, including automated  exposure control and exposure modulation based on body size.           This report was finalized on 12/20/2023 5:47 PM by Dr. Sharif Bermeo M.D  on Workstation: [id number]       CT Abdomen Pelvis Without Contrast [[id number]] Collected: 12/20/23 1318     Updated: 12/20/23 1329    Narrative:      CT ABDOMEN PELVIS WO CONTRAST-     HISTORY: Flank pain, dizziness, syncope, weakness.     TECHNIQUE:  CT of the  abdomen and pelvis was performed without  intravenous contrast. Evaluation of solid organs and vasculature is  limited without intravenous contrast. Radiation dose reduction  techniques were utilized, including automated exposure control and  exposure modulation based on body size.     COMPARISON: CT abdomen and pelvis 7/7/2017     FINDINGS:     Heart size is normal. There is no pericardial effusion. There is minimal  dependent atelectasis and or scarring in the posterior lung bases.  Pleural spaces are clear.  The liver is normal in size. The gallbladder is present. The spleen is  normal in size. There are no pancreatic calcifications. The adrenal  glands are within normal limits. There is a 5.4 cm cyst in the inferior  right kidney, which is increased in size from 2017, previously 3.1 cm  when remeasured. There are no renal stones or hydronephrosis.  There is moderate calcific atherosclerosis. Evaluation of abdominal and  pelvic lymph nodes is limited by lack of intravenous and oral contrast  and paucity of intra-abdominal/pelvic fat.  There is no bowel obstruction. There is a moderate predominately  right-sided colonic stool burden. The appendix is visualized. The  bladder is markedly distended to the level of the umbilicus. The  prostate is enlarged.  There is a partially imaged left hip total arthroplasty. Metallic streak  artifact limits evaluation of adjacent structures. There is diffuse  osseous demineralization. There is ventral flowing syndesmophytes  throughout the spine, consistent with diffuse idiopathic skeletal  hyperostosis (DISH).          Impression:         1.  Marked bladder distention. Correlate for possible urinary retention.  Consider Mariscal catheter decompression.  2.  No renal stones or hydronephrosis.  3.  Moderate stool burden.        This report was finalized on 12/20/2023 1:26 PM by Dr. Lo Tello M.D  on Workstation: [id number]       XR Chest 1 View [[id number]] Collected: 12/20/23 7001  "    Updated: 12/20/23 1109    Narrative:      XR CHEST 1 VW-12/20/2023     HISTORY: Back pain. Weakness.     Heart size is within normal limits. Lungs appear free of acute  infiltrates. Bones and soft tissues are unremarkable. Chest appears  unchanged from the 6/21/2017 study.       Impression:      1. No acute process.        This report was finalized on 12/20/2023 11:06 AM by Dr. Dario Dumont M.D on Workstation: [id number]             Results for orders placed during the hospital encounter of 12/20/23    Duplex Venous Lower Extremity - Left CAR    Interpretation Summary  •  Normal left lower extremity venous duplex scan.      Pertinent Labs                         Invalid input(s): \"LDLCALC\"          Test Results Pending at Discharge       Discharge Details        Discharge Medications        New Medications        Instructions Start Date   atenolol 50 MG tablet  Commonly known as: TENORMIN  Replaces: atenolol-chlorthalidone 50-25 MG per tablet   50 mg, Oral, Every 24 Hours Scheduled      Lidocaine 4 %   2 patches, Transdermal, Every 24 Hours Scheduled, Remove & Discard patch within 12 hours or as directed by MD      muscle rub 10-15 % cream cream   Topical, 4 Times Daily PRN      pantoprazole 40 MG EC tablet  Commonly known as: PROTONIX   40 mg, Oral, Every Early Morning      tamsulosin 0.4 MG capsule 24 hr capsule  Commonly known as: FLOMAX   0.4 mg, Oral, Nightly             Continue These Medications        Instructions Start Date   esomeprazole 20 MG capsule  Commonly known as: nexIUM   20 mg, Oral, Daily PRN      methotrexate 2.5 MG tablet   Take 1 tablet by mouth 2 (Two) Times a Week. Pt takes every Wednesday and thursday      Naproxen Sodium 220 MG capsule   220 mg of amoxicillin, Oral, Daily      OMEGA 3 PO   1 capsule, Oral, Daily             Stop These Medications      atenolol-chlorthalidone 50-25 MG per tablet  Commonly known as: TENORETIC  Replaced by: atenolol 50 MG tablet              No Known " Allergies    Discharge Disposition:  Skilled Nursing Facility (DC - External)      Discharge Diet:  Diet Order   Procedures   • Diet: Cardiac Diets; Healthy Heart (2-3 Na+); Texture: Regular Texture (IDDSI 7); Fluid Consistency: Thin (IDDSI 0)       Discharge Activity:   Activity Instructions       Activity as Tolerated              CODE STATUS:    Code Status and Medical Interventions:   Ordered at: 12/20/23 2221     Code Status (Patient has no pulse and is not breathing):    CPR (Attempt to Resuscitate)     Medical Interventions (Patient has pulse or is breathing):    Full       No future appointments.  Additional Instructions for the Follow-ups that You Need to Schedule       Ambulatory Referral to Home Health ([facility])   As directed      Face to Face Visit Date: 12/24/2023   Follow-up provider for Plan of Care?: I treated the patient in an acute care facility and will not continue treatment after discharge.   Follow-up provider: MARCO MACEDO [[id number]]   Reason/Clinical Findings: debility   Describe mobility limitations that make leaving home difficult: debility; ankylosing spond.   Nursing/Therapeutic Services Requested: Physical Therapy Occupational Therapy   PT orders: Strengthening Therapeutic exercise   Occupational orders: Strengthening   Frequency: 1 Week 1               Contact information for follow-up providers       Marco Macedo MD .    Specialty: Family Medicine  Contact information:  [address] [address] [phone]                       Contact information for after-discharge care       Destination       [facility] .    Service: Skilled Nursing  Contact information:  [address] [facility] [address] [phone]                                   Additional Instructions for the Follow-ups that You Need to Schedule       Ambulatory Referral to Home Health ([facility])   As directed      Face to Face Visit Date: 12/24/2023   Follow-up  provider for Plan of Care?: I treated the patient in an acute care facility and will not continue treatment after discharge.   Follow-up provider: BRANDIE SNYDER [[id number]]   Reason/Clinical Findings: debility   Describe mobility limitations that make leaving home difficult: debility; ankylosing spond.   Nursing/Therapeutic Services Requested: Physical Therapy Occupational Therapy   PT orders: Strengthening Therapeutic exercise   Occupational orders: Strengthening   Frequency: 1 Week 1            Time Spent on Discharge:  Greater than 30 minutes      Shola Haque MD  [facility]  01/05/24  12:51 EST

## 2024-01-05 NOTE — PLAN OF CARE
Problem: Adult Inpatient Plan of Care  Goal: Plan of Care Review  Outcome: Ongoing, Progressing  Goal: Patient-Specific Goal (Individualized)  Outcome: Ongoing, Progressing  Goal: Absence of Hospital-Acquired Illness or Injury  Outcome: Ongoing, Progressing  Intervention: Identify and Manage Fall Risk  Recent Flowsheet Documentation  Taken 1/5/2024 0554 by Jaycee Acuna RN  Safety Promotion/Fall Prevention:   safety round/check completed   lighting adjusted   clutter free environment maintained   assistive device/personal items within reach  Taken 1/5/2024 0400 by Jaycee Acuna RN  Safety Promotion/Fall Prevention:   safety round/check completed   lighting adjusted   clutter free environment maintained   assistive device/personal items within reach  Taken 1/5/2024 0200 by Jaycee Acuna RN  Safety Promotion/Fall Prevention:   safety round/check completed   lighting adjusted   clutter free environment maintained   assistive device/personal items within reach  Taken 1/5/2024 0000 by Jaycee Acuna RN  Safety Promotion/Fall Prevention:   safety round/check completed   lighting adjusted   clutter free environment maintained   assistive device/personal items within reach  Taken 1/4/2024 2200 by Jaycee Acuna RN  Safety Promotion/Fall Prevention:   safety round/check completed   lighting adjusted  Taken 1/4/2024 2000 by Jaycee cAuna RN  Safety Promotion/Fall Prevention:   safety round/check completed   lighting adjusted   clutter free environment maintained   assistive device/personal items within reach  Taken 1/4/2024 1940 by Jaycee Acuna RN  Safety Promotion/Fall Prevention:   safety round/check completed   lighting adjusted   clutter free environment maintained   assistive device/personal items within reach  Intervention: Prevent Infection  Recent Flowsheet Documentation  Taken 1/5/2024 0554 by Jaycee Acuna RN  Infection Prevention:   rest/sleep promoted   single patient room  provided  Taken 1/5/2024 0400 by Jaycee Acuna RN  Infection Prevention:   single patient room provided   rest/sleep promoted  Taken 1/5/2024 0200 by Jaycee Acuna RN  Infection Prevention:   single patient room provided   rest/sleep promoted  Taken 1/5/2024 0000 by Jaycee Acuna RN  Infection Prevention:   single patient room provided   rest/sleep promoted  Taken 1/4/2024 2200 by Jaycee Acuna RN  Infection Prevention:   single patient room provided   rest/sleep promoted   hand hygiene promoted  Taken 1/4/2024 2000 by Jaycee Acuna RN  Infection Prevention:   single patient room provided   rest/sleep promoted   hand hygiene promoted  Taken 1/4/2024 1940 by Jaycee Acuna RN  Infection Prevention:   single patient room provided   rest/sleep promoted   hand hygiene promoted  Goal: Optimal Comfort and Wellbeing  Outcome: Ongoing, Progressing  Intervention: Provide Person-Centered Care  Recent Flowsheet Documentation  Taken 1/4/2024 1940 by Jaycee Acuna RN  Trust Relationship/Rapport:   care explained   questions answered   thoughts/feelings acknowledged  Goal: Readiness for Transition of Care  Outcome: Ongoing, Progressing     Problem: Fall Injury Risk  Goal: Absence of Fall and Fall-Related Injury  Outcome: Ongoing, Progressing  Intervention: Identify and Manage Contributors  Recent Flowsheet Documentation  Taken 1/5/2024 0554 by Jaycee Acuna RN  Medication Review/Management: medications reviewed  Taken 1/4/2024 2200 by Jaycee Acuna RN  Medication Review/Management: medications reviewed  Taken 1/4/2024 2000 by Jaycee Acuna RN  Medication Review/Management: medications reviewed  Taken 1/4/2024 1940 by Jaycee Acuna RN  Medication Review/Management: medications reviewed  Intervention: Promote Injury-Free Environment  Recent Flowsheet Documentation  Taken 1/5/2024 0554 by Jaycee Acuna RN  Safety Promotion/Fall Prevention:   safety round/check completed   lighting  adjusted   clutter free environment maintained   assistive device/personal items within reach  Taken 1/5/2024 0400 by Jaycee Acuna RN  Safety Promotion/Fall Prevention:   safety round/check completed   lighting adjusted   clutter free environment maintained   assistive device/personal items within reach  Taken 1/5/2024 0200 by Jaycee Acuna RN  Safety Promotion/Fall Prevention:   safety round/check completed   lighting adjusted   clutter free environment maintained   assistive device/personal items within reach  Taken 1/5/2024 0000 by Jaycee Acuna RN  Safety Promotion/Fall Prevention:   safety round/check completed   lighting adjusted   clutter free environment maintained   assistive device/personal items within reach  Taken 1/4/2024 2200 by Jaycee Acuna RN  Safety Promotion/Fall Prevention:   safety round/check completed   lighting adjusted  Taken 1/4/2024 2000 by Jaycee Acuna RN  Safety Promotion/Fall Prevention:   safety round/check completed   lighting adjusted   clutter free environment maintained   assistive device/personal items within reach  Taken 1/4/2024 1940 by Jaycee Acuna RN  Safety Promotion/Fall Prevention:   safety round/check completed   lighting adjusted   clutter free environment maintained   assistive device/personal items within reach     Problem: Skin Injury Risk Increased  Goal: Skin Health and Integrity  Outcome: Ongoing, Progressing  Goal: Skin Health and Integrity  Outcome: Ongoing, Progressing     Problem: Malnutrition  Goal: Improved Nutritional Intake  Outcome: Ongoing, Progressing   Goal Outcome Evaluation:      Goal Outcome Evaluation:  Plan of Care Reviewed With: patient  Progress: no change  Outcome Evaluation: he is alert and oriented x4. VSS, on room air, rested well in bed throughout this shift, placement is pending to SNF, no needs voiced at this time. Call light and personal items within reach. Safety precautions in place

## 2024-01-06 LAB
ALBUMIN SERPL-MCNC: 3.4 G/DL (ref 3.5–5.2)
ANION GAP SERPL CALCULATED.3IONS-SCNC: 10 MMOL/L (ref 5–15)
BASOPHILS # BLD AUTO: 0.03 10*3/MM3 (ref 0–0.2)
BASOPHILS NFR BLD AUTO: 0.7 % (ref 0–1.5)
BUN SERPL-MCNC: 13 MG/DL (ref 8–23)
BUN/CREAT SERPL: 14.8 (ref 7–25)
CALCIUM SPEC-SCNC: 8.9 MG/DL (ref 8.6–10.5)
CHLORIDE SERPL-SCNC: 104 MMOL/L (ref 98–107)
CO2 SERPL-SCNC: 21 MMOL/L (ref 22–29)
CREAT SERPL-MCNC: 0.88 MG/DL (ref 0.76–1.27)
DEPRECATED RDW RBC AUTO: 47.9 FL (ref 37–54)
EGFRCR SERPLBLD CKD-EPI 2021: 87.5 ML/MIN/1.73
EOSINOPHIL # BLD AUTO: 0.11 10*3/MM3 (ref 0–0.4)
EOSINOPHIL NFR BLD AUTO: 2.7 % (ref 0.3–6.2)
ERYTHROCYTE [DISTWIDTH] IN BLOOD BY AUTOMATED COUNT: 13.6 % (ref 12.3–15.4)
GLUCOSE SERPL-MCNC: 98 MG/DL (ref 65–99)
HCT VFR BLD AUTO: 37.6 % (ref 37.5–51)
HGB BLD-MCNC: 12.3 G/DL (ref 13–17.7)
IMM GRANULOCYTES # BLD AUTO: 0.01 10*3/MM3 (ref 0–0.05)
IMM GRANULOCYTES NFR BLD AUTO: 0.2 % (ref 0–0.5)
LYMPHOCYTES # BLD AUTO: 0.86 10*3/MM3 (ref 0.7–3.1)
LYMPHOCYTES NFR BLD AUTO: 21.3 % (ref 19.6–45.3)
MCH RBC QN AUTO: 31.9 PG (ref 26.6–33)
MCHC RBC AUTO-ENTMCNC: 32.7 G/DL (ref 31.5–35.7)
MCV RBC AUTO: 97.4 FL (ref 79–97)
MONOCYTES # BLD AUTO: 0.49 10*3/MM3 (ref 0.1–0.9)
MONOCYTES NFR BLD AUTO: 12.1 % (ref 5–12)
NEUTROPHILS NFR BLD AUTO: 2.54 10*3/MM3 (ref 1.7–7)
NEUTROPHILS NFR BLD AUTO: 63 % (ref 42.7–76)
NRBC BLD AUTO-RTO: 0 /100 WBC (ref 0–0.2)
PHOSPHATE SERPL-MCNC: 3.1 MG/DL (ref 2.5–4.5)
PLATELET # BLD AUTO: 205 10*3/MM3 (ref 140–450)
PMV BLD AUTO: 9.5 FL (ref 6–12)
POTASSIUM SERPL-SCNC: 4.8 MMOL/L (ref 3.5–5.2)
RBC # BLD AUTO: 3.86 10*6/MM3 (ref 4.14–5.8)
SODIUM SERPL-SCNC: 135 MMOL/L (ref 136–145)
WBC NRBC COR # BLD AUTO: 4.04 10*3/MM3 (ref 3.4–10.8)

## 2024-01-06 PROCEDURE — 80069 RENAL FUNCTION PANEL: CPT | Performed by: HOSPITALIST

## 2024-01-06 PROCEDURE — 85025 COMPLETE CBC W/AUTO DIFF WBC: CPT | Performed by: HOSPITALIST

## 2024-01-06 PROCEDURE — 25010000002 ENOXAPARIN PER 10 MG: Performed by: HOSPITALIST

## 2024-01-06 RX ADMIN — ENOXAPARIN SODIUM 40 MG: 100 INJECTION SUBCUTANEOUS at 13:23

## 2024-01-06 RX ADMIN — ACETAMINOPHEN 1000 MG: 500 TABLET ORAL at 21:35

## 2024-01-06 RX ADMIN — PANTOPRAZOLE SODIUM 40 MG: 40 TABLET, DELAYED RELEASE ORAL at 06:25

## 2024-01-06 RX ADMIN — LIDOCAINE 2 PATCH: 4 PATCH TOPICAL at 10:09

## 2024-01-06 RX ADMIN — TAMSULOSIN HYDROCHLORIDE 0.4 MG: 0.4 CAPSULE ORAL at 21:35

## 2024-01-06 RX ADMIN — ACETAMINOPHEN 1000 MG: 500 TABLET ORAL at 10:07

## 2024-01-06 RX ADMIN — ACETAMINOPHEN 1000 MG: 500 TABLET ORAL at 16:15

## 2024-01-06 NOTE — PROGRESS NOTES
Name: Anthony Gallegos ADMIT: 2023   : 1945  PCP: Marco Macedo MD    MRN: [id number] LOS: 15 days   AGE/SEX: 78 y.o. male  ROOM: [facility]/     Subjective   Subjective     He is feeling better this morning.  He remained stable.  Denies new issues or complaints but has not really gotten up and moving this morning.  His biggest concern today is that he is already paid for some of these medications for discharge and would like to either have them or his money back prior to leaving.    Objective   Objective   Vital Signs  Temp:  [98.1 °F (36.7 °C)-98.4 °F (36.9 °C)] 98.2 °F (36.8 °C)  Heart Rate:  [65-78] 68  Resp:  [18] 18  BP: (104-150)/(56-68) 126/66  SpO2:  [100 %] 100 %  on   ;   Device (Oxygen Therapy): room air  Body mass index is 24.22 kg/m².    Physical Exam  Constitutional:       General: He is not in acute distress.     Appearance: He is ill-appearing. He is not toxic-appearing.      Comments: Chronic ill appearing   HENT:      Head: Normocephalic and atraumatic.   Cardiovascular:      Rate and Rhythm: Normal rate and regular rhythm.   Pulmonary:      Effort: Pulmonary effort is normal. No respiratory distress.      Breath sounds: Normal breath sounds.   Abdominal:      General: Bowel sounds are normal.      Palpations: Abdomen is soft.      Tenderness: There is no abdominal tenderness.   Musculoskeletal:         General: No swelling.   Skin:     General: Skin is warm and dry.   Neurological:      General: No focal deficit present.      Mental Status: He is alert.   Psychiatric:         Mood and Affect: Mood normal.         Behavior: Behavior normal.     Results Review  I reviewed the patient's new clinical results.              Lab Results   Component Value Date    ANIONGAP 6.0 2023     Estimated Creatinine Clearance: 106.4 mL/min (A) (by C-G formula based on SCr of 0.7 mg/dL (L)).   Lab Results   Component Value Date    EGFR 93.7 2023                   No results found for:  "\"HGBA1C\", \"POCGLU\"      No radiology results for the last day    Scheduled Meds  acetaminophen, 1,000 mg, Oral, TID  [Held by provider] atenolol, 25 mg, Oral, Q24H  enoxaparin, 40 mg, Subcutaneous, Q24H  Lidocaine, 2 patch, Transdermal, Q24H  methotrexate, 2.5 mg, Oral, Once per day on Wed Thu  pantoprazole, 40 mg, Oral, Q AM  senna-docusate sodium, 2 tablet, Oral, BID  tamsulosin, 0.4 mg, Oral, Nightly    Continuous Infusions   PRN Meds    acetaminophen **OR** acetaminophen **OR** acetaminophen    senna-docusate sodium **AND** polyethylene glycol **AND** bisacodyl **AND** bisacodyl    calcium carbonate    Calcium Replacement - Follow Nurse / BPA Driven Protocol    Magnesium Standard Dose Replacement - Follow Nurse / BPA Driven Protocol    melatonin    muscle rub    ondansetron ODT **OR** ondansetron    Phosphorus Replacement - Follow Nurse / BPA Driven Protocol    Potassium Replacement - Follow Nurse / BPA Driven Protocol    [COMPLETED] Insert Peripheral IV **AND** sodium chloride    sodium chloride    sodium chloride     Diet  Diet: Cardiac Diets; Healthy Heart (2-3 Na+); Texture: Regular Texture (IDDSI 7); Fluid Consistency: Thin (IDDSI 0)    I have personally reviewed:  [x]  Medications  []  Laboratory   []  Microbiology   []  Radiology   []  EKG/Telemetry   []  Cardiology/Vascular   []  Pathology   []  Records      Assessment/Plan     Active Hospital Problems    Diagnosis  POA    **Generalized weakness [R53.1]  Yes    Severe malnutrition [E43]  Yes    Immobility [Z74.09]  Yes    Fall from bed [W06.XXXA]  Yes    Tetrahydrocannabinol (THC) use disorder, mild, abuse [F12.10]  Yes    Generalized pain [R52]  Yes     [Z63.4]  Yes    Grief [F43.21]  Yes    DISH (disseminated idiopathic skeletal hyperostosis) [M48.10]  Yes    Hypertension [I10]  Yes    Ankylosing spondylitis of cervical region [M45.2]  Yes      Resolved Hospital Problems    Diagnosis Date Resolved POA    Urine retention [R33.9] 12/24/2023 Yes    " Constipation [K59.00] 12/24/2023 Yes     78 y.o. male admitted with generalized weakness    Generalized weakness  Immobility  Fall from bed  Multifactorial secondary to age and chronic comorbidities  PT/OT following     Generalized pain  Disseminated idiopathic skeletal hyperostosis  Ankylosing spondylitis  X-ray of lumbar spine with diffuse ankylosis without any obvious injury or fracture.  methotrexate     Urinary retention  Constipation  catheter is out. Flomax added.  Urology evaluated     Elevated CK, improved  Lactic acidosis, resolved  related to dehydration     Hypertension  Still with some low normal BPs and heart rate but not as low today (no SBPs in the 90s). Metoprolol remains on hold    DVT prophylaxis  Lovenox 40 mg SC daily    Discharge  Awaiting SNF  Expected Discharge Date: 1/3/2024; Expected Discharge Time:     Discussed with patient and nursing staff    Shola Haque MD  [facility]

## 2024-01-06 NOTE — PLAN OF CARE
AAOX4. RA. Heels and buttocks blanchable. No pressure area seen. Up to chair this shift. Waiting for placement.     Goal Outcome Evaluation:  Plan of Care Reviewed With: patient        Progress: no change            Problem: Adult Inpatient Plan of Care  Goal: Plan of Care Review  Outcome: Ongoing, Progressing  Flowsheets (Taken 1/6/2024 1822)  Progress: no change  Plan of Care Reviewed With: patient  Goal: Patient-Specific Goal (Individualized)  Outcome: Ongoing, Progressing  Goal: Absence of Hospital-Acquired Illness or Injury  Outcome: Ongoing, Progressing  Intervention: Identify and Manage Fall Risk  Recent Flowsheet Documentation  Taken 1/6/2024 1615 by Maya Corcoran RN  Safety Promotion/Fall Prevention:   safety round/check completed   room organization consistent   nonskid shoes/slippers when out of bed   lighting adjusted   clutter free environment maintained   assistive device/personal items within reach   activity supervised  Taken 1/6/2024 1450 by Maya Corcoran, RN  Safety Promotion/Fall Prevention:   safety round/check completed   room organization consistent   nonskid shoes/slippers when out of bed   lighting adjusted   clutter free environment maintained   assistive device/personal items within reach   activity supervised  Taken 1/6/2024 1200 by Maya Corcoran RN  Safety Promotion/Fall Prevention:   safety round/check completed   room organization consistent   nonskid shoes/slippers when out of bed   lighting adjusted   clutter free environment maintained   activity supervised   assistive device/personal items within reach  Taken 1/6/2024 1010 by Maya Corcoran, RN  Safety Promotion/Fall Prevention:   safety round/check completed   room organization consistent   nonskid shoes/slippers when out of bed   lighting adjusted   clutter free environment maintained   activity supervised   assistive device/personal items within reach  Taken 1/6/2024 0810 by Maya Corcoran, RN  Safety  Promotion/Fall Prevention:   safety round/check completed   room organization consistent   nonskid shoes/slippers when out of bed   lighting adjusted   clutter free environment maintained   assistive device/personal items within reach   activity supervised  Intervention: Prevent Skin Injury  Recent Flowsheet Documentation  Taken 1/6/2024 1615 by Maya Corcoran RN  Body Position: position changed independently  Taken 1/6/2024 1450 by Maya Corcoran RN  Body Position: (up in chair)   other (see comments)   weight shifting  Skin Protection:   adhesive use limited   incontinence pads utilized   tubing/devices free from skin contact   skin-to-skin areas padded   transparent dressing maintained   protective footwear used   pulse oximeter probe site changed  Taken 1/6/2024 1200 by Maya Corcoran RN  Body Position: (up in chair) other (see comments)  Taken 1/6/2024 1010 by Maya Corcoran RN  Body Position: position changed independently  Skin Protection:   adhesive use limited   incontinence pads utilized   transparent dressing maintained   tubing/devices free from skin contact   pulse oximeter probe site changed   protective footwear used  Taken 1/6/2024 0810 by Maya Corcoran RN  Body Position: position changed independently  Intervention: Prevent and Manage VTE (Venous Thromboembolism) Risk  Recent Flowsheet Documentation  Taken 1/6/2024 1615 by Maya Corcoran RN  Activity Management: up in chair  Taken 1/6/2024 1450 by Maya Corcoran RN  Activity Management: up in chair  VTE Prevention/Management:   bilateral   sequential compression devices off   patient refused intervention  Range of Motion: active ROM (range of motion) encouraged  Taken 1/6/2024 1200 by Maya Corcoran RN  Activity Management: ambulated in room  Taken 1/6/2024 1010 by Maya Corcoran RN  Activity Management: activity encouraged  VTE Prevention/Management:   bilateral   sequential compression devices off   patient refused  intervention  Range of Motion: active ROM (range of motion) encouraged  Intervention: Prevent Infection  Recent Flowsheet Documentation  Taken 1/6/2024 1615 by Maya Corcoran RN  Infection Prevention:   single patient room provided   rest/sleep promoted   personal protective equipment utilized   hand hygiene promoted  Taken 1/6/2024 1450 by Maya Corcoran RN  Infection Prevention:   single patient room provided   rest/sleep promoted   personal protective equipment utilized   hand hygiene promoted  Taken 1/6/2024 1200 by Maya Corcoran RN  Infection Prevention:   single patient room provided   rest/sleep promoted   personal protective equipment utilized   hand hygiene promoted  Taken 1/6/2024 1010 by Maya Corcoran RN  Infection Prevention:   single patient room provided   rest/sleep promoted   personal protective equipment utilized   hand hygiene promoted  Taken 1/6/2024 0810 by Maya Corcoran RN  Infection Prevention:   single patient room provided   rest/sleep promoted   hand hygiene promoted   personal protective equipment utilized  Goal: Optimal Comfort and Wellbeing  Outcome: Ongoing, Progressing  Intervention: Monitor Pain and Promote Comfort  Recent Flowsheet Documentation  Taken 1/6/2024 1010 by Maya Corcoran RN  Pain Management Interventions:   see MAR   pillow support provided   position adjusted  Intervention: Provide Person-Centered Care  Recent Flowsheet Documentation  Taken 1/6/2024 1450 by Maya Corcoran RN  Trust Relationship/Rapport:   care explained   choices provided   emotional support provided   empathic listening provided   questions answered   questions encouraged   reassurance provided   thoughts/feelings acknowledged  Taken 1/6/2024 1010 by Maya Corcoran RN  Trust Relationship/Rapport:   care explained   choices provided   emotional support provided   empathic listening provided   questions answered   questions encouraged   thoughts/feelings acknowledged  Goal:  Readiness for Transition of Care  Outcome: Ongoing, Progressing     Problem: Fall Injury Risk  Goal: Absence of Fall and Fall-Related Injury  Outcome: Ongoing, Progressing  Intervention: Identify and Manage Contributors  Recent Flowsheet Documentation  Taken 1/6/2024 1615 by Maya Corcoran RN  Medication Review/Management: medications reviewed  Taken 1/6/2024 1450 by Maya Corcoran RN  Medication Review/Management: medications reviewed  Taken 1/6/2024 1200 by Maya Corcoran RN  Medication Review/Management: medications reviewed  Taken 1/6/2024 1010 by Maya Corcoran RN  Medication Review/Management: medications reviewed  Taken 1/6/2024 0810 by Maya Corcoran RN  Medication Review/Management: medications reviewed  Intervention: Promote Injury-Free Environment  Recent Flowsheet Documentation  Taken 1/6/2024 1615 by Maya Corcoran RN  Safety Promotion/Fall Prevention:   safety round/check completed   room organization consistent   nonskid shoes/slippers when out of bed   lighting adjusted   clutter free environment maintained   assistive device/personal items within reach   activity supervised  Taken 1/6/2024 1450 by Maya Corcoran RN  Safety Promotion/Fall Prevention:   safety round/check completed   room organization consistent   nonskid shoes/slippers when out of bed   lighting adjusted   clutter free environment maintained   assistive device/personal items within reach   activity supervised  Taken 1/6/2024 1200 by Maya Corcoran RN  Safety Promotion/Fall Prevention:   safety round/check completed   room organization consistent   nonskid shoes/slippers when out of bed   lighting adjusted   clutter free environment maintained   activity supervised   assistive device/personal items within reach  Taken 1/6/2024 1010 by Maya Corcoran RN  Safety Promotion/Fall Prevention:   safety round/check completed   room organization consistent   nonskid shoes/slippers when out of bed   lighting  adjusted   clutter free environment maintained   activity supervised   assistive device/personal items within reach  Taken 1/6/2024 0810 by Maya Corcoran RN  Safety Promotion/Fall Prevention:   safety round/check completed   room organization consistent   nonskid shoes/slippers when out of bed   lighting adjusted   clutter free environment maintained   assistive device/personal items within reach   activity supervised     Problem: Skin Injury Risk Increased  Goal: Skin Health and Integrity  Outcome: Ongoing, Progressing  Intervention: Optimize Skin Protection  Recent Flowsheet Documentation  Taken 1/6/2024 1615 by Maya Corcoran RN  Head of Bed (HOB) Positioning: HOB elevated  Taken 1/6/2024 1450 by Maya Corcoran RN  Pressure Reduction Techniques:   frequent weight shift encouraged   heels elevated off bed   pressure points protected  Head of Bed (HOB) Positioning: (up in chair) other (see comments)  Pressure Reduction Devices: pressure-redistributing mattress utilized  Skin Protection:   adhesive use limited   incontinence pads utilized   tubing/devices free from skin contact   skin-to-skin areas padded   transparent dressing maintained   protective footwear used   pulse oximeter probe site changed  Taken 1/6/2024 1200 by Maya Corcoran RN  Head of Bed (HOB) Positioning: (up in chair) other (see comments)  Taken 1/6/2024 1010 by Maya Corcoran RN  Pressure Reduction Techniques:   frequent weight shift encouraged   heels elevated off bed   pressure points protected   weight shift assistance provided  Head of Bed (HOB) Positioning: HOB elevated  Pressure Reduction Devices: pressure-redistributing mattress utilized  Skin Protection:   adhesive use limited   incontinence pads utilized   transparent dressing maintained   tubing/devices free from skin contact   pulse oximeter probe site changed   protective footwear used  Taken 1/6/2024 0810 by Maya Corcoran RN  Head of Bed (HOB) Positioning: HOB  elevated  Goal: Skin Health and Integrity  Outcome: Ongoing, Progressing  Intervention: Optimize Skin Protection  Recent Flowsheet Documentation  Taken 1/6/2024 1615 by Maya Corcoran RN  Head of Bed (\[facility]\"") Positioning: HOB elevated  Taken 1/6/2024 1450 by Maya Corcoran RN  Pressure Reduction Techniques:   frequent weight shift encouraged   heels elevated off bed   pressure points protected  Head of Bed (HOB) Positioning: (up in chair) other (see comments)  Pressure Reduction Devices: pressure-redistributing mattress utilized  Skin Protection:   adhesive use limited   incontinence pads utilized   tubing/devices free from skin contact   skin-to-skin areas padded   transparent dressing maintained   protective footwear used   pulse oximeter probe site changed  Taken 1/6/2024 1200 by Maya Corcoran RN  Head of Bed (\[facility]\"") Positioning: (up in chair) other (see comments)  Taken 1/6/2024 1010 by Maya Corcoran RN  Pressure Reduction Techniques:   frequent weight shift encouraged   heels elevated off bed   pressure points protected   weight shift assistance provided  Head of Bed (\[facility]\"") Positioning: HOB elevated  Pressure Reduction Devices: pressure-redistributing mattress utilized  Skin Protection:   adhesive use limited   incontinence pads utilized   transparent dressing maintained   tubing/devices free from skin contact   pulse oximeter probe site changed   protective footwear used  Taken 1/6/2024 0810 by Maya Corcoran RN  Head of Bed (\[facility]\"") Positioning: HOB elevated     Problem: Malnutrition  Goal: Improved Nutritional Intake  Outcome: Ongoing, Progressing

## 2024-01-06 NOTE — PLAN OF CARE
Goal Outcome Evaluation:  Plan of Care Reviewed With: patient        Progress: improving  Outcome Evaluation: pt is axox4. vSS. room air. pt had a few incontinence episodes of bowel at the beginning of shift. pt denies pain. up with 1 assist and walker. precert pending.

## 2024-01-06 NOTE — PROGRESS NOTES
"    Name: Anthony Gallegos ADMIT: 2023   : 1945  PCP: Marco Macedo MD    MRN: [id number] LOS: 15 days   AGE/SEX: 78 y.o. male  ROOM: \[facility]\""     Subjective   Subjective     Resting well no new issues asleep this morning and not woken    Objective   Objective   Vital Signs  Temp:  [98.1 °F (36.7 °C)-98.4 °F (36.9 °C)] 98.2 °F (36.8 °C)  Heart Rate:  [65-78] 68  Resp:  [18] 18  BP: (104-150)/(56-68) 126/66  SpO2:  [100 %] 100 %  on   ;   Device (Oxygen Therapy): room air  Body mass index is 24.22 kg/m².    Physical Exam  Constitutional:       General: He is not in acute distress.     Appearance: He is ill-appearing. He is not toxic-appearing.      Comments: Chronic ill appearing   HENT:      Head: Normocephalic and atraumatic.   Cardiovascular:      Rate and Rhythm: Normal rate and regular rhythm.   Pulmonary:      Effort: Pulmonary effort is normal. No respiratory distress.      Breath sounds: Normal breath sounds.   Abdominal:      General: Bowel sounds are normal.      Palpations: Abdomen is soft.      Tenderness: There is no abdominal tenderness.   Musculoskeletal:         General: No swelling.   Skin:     General: Skin is warm and dry.   Neurological:      General: No focal deficit present.      Mental Status: He is alert.   Psychiatric:         Mood and Affect: Mood normal.         Behavior: Behavior normal.     Results Review  I reviewed the patient's new clinical results.              Lab Results   Component Value Date    ANIONGAP 6.0 2023     Estimated Creatinine Clearance: 106.4 mL/min (A) (by C-G formula based on SCr of 0.7 mg/dL (L)).   Lab Results   Component Value Date    EGFR 93.7 2023                   No results found for: \"HGBA1C\", \"POCGLU\"      No radiology results for the last day    Scheduled Meds  acetaminophen, 1,000 mg, Oral, TID  [Held by provider] atenolol, 25 mg, Oral, Q24H  enoxaparin, 40 mg, Subcutaneous, Q24H  Lidocaine, 2 patch, Transdermal, Q24H  methotrexate, " 2.5 mg, Oral, Once per day on Wed Thu  pantoprazole, 40 mg, Oral, Q AM  senna-docusate sodium, 2 tablet, Oral, BID  tamsulosin, 0.4 mg, Oral, Nightly    Continuous Infusions   PRN Meds    acetaminophen **OR** acetaminophen **OR** acetaminophen    senna-docusate sodium **AND** polyethylene glycol **AND** bisacodyl **AND** bisacodyl    calcium carbonate    Calcium Replacement - Follow Nurse / BPA Driven Protocol    Magnesium Standard Dose Replacement - Follow Nurse / BPA Driven Protocol    melatonin    muscle rub    ondansetron ODT **OR** ondansetron    Phosphorus Replacement - Follow Nurse / BPA Driven Protocol    Potassium Replacement - Follow Nurse / BPA Driven Protocol    [COMPLETED] Insert Peripheral IV **AND** sodium chloride    sodium chloride    sodium chloride     Diet  Diet: Cardiac Diets; Healthy Heart (2-3 Na+); Texture: Regular Texture (IDDSI 7); Fluid Consistency: Thin (IDDSI 0)    I have personally reviewed:  [x]  Medications  []  Laboratory   []  Microbiology   []  Radiology   []  EKG/Telemetry   []  Cardiology/Vascular   []  Pathology   []  Records      Assessment/Plan     Active Hospital Problems    Diagnosis  POA    **Generalized weakness [R53.1]  Yes    Severe malnutrition [E43]  Yes    Immobility [Z74.09]  Yes    Fall from bed [W06.XXXA]  Yes    Tetrahydrocannabinol (THC) use disorder, mild, abuse [F12.10]  Yes    Generalized pain [R52]  Yes     [Z63.4]  Yes    Grief [F43.21]  Yes    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 male admitted with generalized weakness    Generalized weakness  Immobility  Fall from bed  Multifactorial secondary to age and chronic comorbidities  PT/OT following     Generalized pain  Disseminated  idiopathic skeletal hyperostosis  Ankylosing spondylitis  X-ray of lumbar spine with diffuse ankylosis without any obvious injury or fracture.  methotrexate     Urinary retention  Constipation  catheter is out. Flomax added.  Urology evaluated     Elevated CK, improved  Lactic acidosis, resolved  related to dehydration     Hypertension  Still with some low normal BPs and heart rate but not as low today (no SBPs in the 90s). Metoprolol remains on hold    DVT prophylaxis  Lovenox 40 mg SC daily    Discharge  Awaiting SNF  Expected Discharge Date: 1/3/2024; Expected Discharge Time:   I am beyond frustrated at the games the insurance company is planning at this point.  This gentleman's been waiting for a bed in rehab for over a week.  He remains medically stable with no active medical issues to address  Discussed with patient and nursing staff    Shola Haque MD  [facility]  01/06/24

## 2024-01-06 NOTE — PLAN OF CARE
Pt had an uneventful day. Sat in the chair for most of the morning and afternoon. Still no word concerning transfer to rehab. Continued to be incontinent of bowel, had several loose BM. Encouraged to continued to ambulate to the bathroom to void. No acute signs of distress noted. X2 side rails up, bed in low locked position, call light within reach.     Problem: Adult Inpatient Plan of Care  Goal: Plan of Care Review  Outcome: Ongoing, Progressing  Goal: Patient-Specific Goal (Individualized)  Outcome: Ongoing, Progressing  Goal: Absence of Hospital-Acquired Illness or Injury  Outcome: Ongoing, Progressing  Intervention: Identify and Manage Fall Risk  Recent Flowsheet Documentation  Taken 1/5/2024 1800 by Jessica Martin RN  Safety Promotion/Fall Prevention:   nonskid shoes/slippers when out of bed   safety round/check completed   clutter free environment maintained  Taken 1/5/2024 1600 by Jessica Martin RN  Safety Promotion/Fall Prevention:   nonskid shoes/slippers when out of bed   safety round/check completed   clutter free environment maintained  Taken 1/5/2024 1400 by Jessica Martin RN  Safety Promotion/Fall Prevention:   nonskid shoes/slippers when out of bed   safety round/check completed   clutter free environment maintained  Taken 1/5/2024 1200 by Jessica Martin RN  Safety Promotion/Fall Prevention:   nonskid shoes/slippers when out of bed   safety round/check completed   clutter free environment maintained  Taken 1/5/2024 1000 by Jessica Martin RN  Safety Promotion/Fall Prevention:   nonskid shoes/slippers when out of bed   safety round/check completed   clutter free environment maintained  Taken 1/5/2024 0830 by Jessica Martin RN  Safety Promotion/Fall Prevention:   nonskid shoes/slippers when out of bed   safety round/check completed   clutter free environment maintained  Intervention: Prevent Skin Injury  Recent Flowsheet Documentation  Taken 1/5/2024 1800 by Jessica Martin RN  Body  Position: position changed independently  Taken 1/5/2024 1600 by Jessica Martin RN  Body Position: position changed independently  Taken 1/5/2024 1400 by Jessica Martin RN  Body Position: position changed independently  Taken 1/5/2024 1200 by Jessica Martin RN  Body Position: position changed independently  Taken 1/5/2024 1000 by Jessica Martin RN  Body Position: position changed independently  Taken 1/5/2024 0830 by Jessica Martin RN  Body Position: position changed independently  Intervention: Prevent and Manage VTE (Venous Thromboembolism) Risk  Recent Flowsheet Documentation  Taken 1/5/2024 1800 by Jessica Martin RN  Activity Management: bedrest  Taken 1/5/2024 1600 by Jessica Martin RN  Activity Management: bedrest  Taken 1/5/2024 1400 by Jessica Martin RN  Activity Management: up in chair  Taken 1/5/2024 1200 by Jessica Martin RN  Activity Management: up in chair  Taken 1/5/2024 1000 by Jessica Martin RN  Activity Management: up in chair  Taken 1/5/2024 0830 by Jessica Martin RN  Activity Management: ambulated to bathroom  VTE Prevention/Management:   sequential compression devices off   patient refused intervention  Range of Motion:   active ROM (range of motion) encouraged   ROM (range of motion) performed  Intervention: Prevent Infection  Recent Flowsheet Documentation  Taken 1/5/2024 1800 by Jessica Martin RN  Infection Prevention:   environmental surveillance performed   hand hygiene promoted   single patient room provided  Taken 1/5/2024 1600 by Jessica Martin RN  Infection Prevention:   environmental surveillance performed   hand hygiene promoted   single patient room provided  Taken 1/5/2024 1400 by Jessica Martin RN  Infection Prevention:   environmental surveillance performed   hand hygiene promoted   single patient room provided  Taken 1/5/2024 1200 by Jessica Martin RN  Infection Prevention:   environmental surveillance performed   hand hygiene promoted   single  patient room provided  Taken 1/5/2024 1000 by Jessica Martin RN  Infection Prevention:   environmental surveillance performed   hand hygiene promoted   single patient room provided  Taken 1/5/2024 0830 by Jessica Martin RN  Infection Prevention:   environmental surveillance performed   hand hygiene promoted   single patient room provided  Goal: Optimal Comfort and Wellbeing  Outcome: Ongoing, Progressing  Goal: Readiness for Transition of Care  Outcome: Ongoing, Progressing     Problem: Fall Injury Risk  Goal: Absence of Fall and Fall-Related Injury  Outcome: Ongoing, Progressing  Intervention: Promote Injury-Free Environment  Recent Flowsheet Documentation  Taken 1/5/2024 1800 by Jessica Martin RN  Safety Promotion/Fall Prevention:   nonskid shoes/slippers when out of bed   safety round/check completed   clutter free environment maintained  Taken 1/5/2024 1600 by Jessica Martin RN  Safety Promotion/Fall Prevention:   nonskid shoes/slippers when out of bed   safety round/check completed   clutter free environment maintained  Taken 1/5/2024 1400 by Jessica Martin RN  Safety Promotion/Fall Prevention:   nonskid shoes/slippers when out of bed   safety round/check completed   clutter free environment maintained  Taken 1/5/2024 1200 by Jessica Martin RN  Safety Promotion/Fall Prevention:   nonskid shoes/slippers when out of bed   safety round/check completed   clutter free environment maintained  Taken 1/5/2024 1000 by Jessica Martin RN  Safety Promotion/Fall Prevention:   nonskid shoes/slippers when out of bed   safety round/check completed   clutter free environment maintained  Taken 1/5/2024 0830 by Jessica Martin RN  Safety Promotion/Fall Prevention:   nonskid shoes/slippers when out of bed   safety round/check completed   clutter free environment maintained     Problem: Skin Injury Risk Increased  Goal: Skin Health and Integrity  Outcome: Ongoing, Progressing  Intervention: Optimize Skin  Protection  Recent Flowsheet Documentation  Taken 1/5/2024 1800 by Jessica Martin RN  Head of Bed (HOB) Positioning: HOB elevated  Taken 1/5/2024 1600 by Jessica Martin RN  Head of Bed (HOB) Positioning: HOB elevated  Taken 1/5/2024 1400 by Jessica Martin RN  Head of Bed (HOB) Positioning: HOB elevated  Taken 1/5/2024 1200 by Jessica Martin RN  Head of Bed (HOB) Positioning: HOB elevated  Taken 1/5/2024 1000 by Jessica Martin RN  Head of Bed (HOB) Positioning: HOB elevated  Taken 1/5/2024 0830 by Jessica Martin RN  Head of Bed (HOB) Positioning: HOB elevated  Goal: Skin Health and Integrity  Outcome: Ongoing, Progressing  Intervention: Optimize Skin Protection  Recent Flowsheet Documentation  Taken 1/5/2024 1800 by Jessica Martin RN  Head of Bed (HOB) Positioning: HOB elevated  Taken 1/5/2024 1600 by Jessica Martin RN  Head of Bed (HOB) Positioning: HOB elevated  Taken 1/5/2024 1400 by Jessica Martin RN  Head of Bed (HOB) Positioning: HOB elevated  Taken 1/5/2024 1200 by Jessica Martin RN  Head of Bed (HOB) Positioning: HOB elevated  Taken 1/5/2024 1000 by Jessica Martin RN  Head of Bed (HOB) Positioning: HOB elevated  Taken 1/5/2024 0830 by Jessica Martin RN  Head of Bed (HOB) Positioning: HOB elevated     Problem: Malnutrition  Goal: Improved Nutritional Intake  Outcome: Ongoing, Progressing   Goal Outcome Evaluation:

## 2024-01-07 PROCEDURE — 25010000002 ENOXAPARIN PER 10 MG: Performed by: HOSPITALIST

## 2024-01-07 RX ADMIN — ACETAMINOPHEN 1000 MG: 500 TABLET ORAL at 14:50

## 2024-01-07 RX ADMIN — PANTOPRAZOLE SODIUM 40 MG: 40 TABLET, DELAYED RELEASE ORAL at 06:21

## 2024-01-07 RX ADMIN — ACETAMINOPHEN 1000 MG: 500 TABLET ORAL at 20:23

## 2024-01-07 RX ADMIN — TAMSULOSIN HYDROCHLORIDE 0.4 MG: 0.4 CAPSULE ORAL at 20:23

## 2024-01-07 RX ADMIN — ENOXAPARIN SODIUM 40 MG: 100 INJECTION SUBCUTANEOUS at 14:50

## 2024-01-07 RX ADMIN — ACETAMINOPHEN 1000 MG: 500 TABLET ORAL at 09:11

## 2024-01-07 NOTE — PROGRESS NOTES
Name: Anthony Gallegos ADMIT: 2023   : 1945  PCP: Marco Macedo MD    MRN: [id number] LOS: 16 days   AGE/SEX: 78 y.o. male  ROOM: \[facility]\""     Subjective   Subjective     Resting well no new issues asleep this morning and not woken notified by nursing yesterday that he had some urinary changes but no symptoms.  He denies new issues or complaints his only request is that he gets nicotine patches at discharge    Objective   Objective   Vital Signs  Temp:  [97.7 °F (36.5 °C)-98.4 °F (36.9 °C)] 98.2 °F (36.8 °C)  Heart Rate:  [60-75] 60  Resp:  [16-17] 17  BP: ()/(58-87) 104/59  SpO2:  [99 %-100 %] 100 %  on   ;   Device (Oxygen Therapy): room air  Body mass index is 24.22 kg/m².    Physical Exam  Constitutional:       General: He is not in acute distress.     Appearance: He is ill-appearing. He is not toxic-appearing.      Comments: Chronic ill appearing   HENT:      Head: Normocephalic and atraumatic.   Cardiovascular:      Rate and Rhythm: Normal rate and regular rhythm.   Pulmonary:      Effort: Pulmonary effort is normal. No respiratory distress.      Breath sounds: Normal breath sounds.   Abdominal:      General: Bowel sounds are normal.      Palpations: Abdomen is soft.      Tenderness: There is no abdominal tenderness.   Musculoskeletal:         General: No swelling.   Skin:     General: Skin is warm and dry.   Neurological:      General: No focal deficit present.      Mental Status: He is alert.   Psychiatric:         Mood and Affect: Mood normal.         Behavior: Behavior normal.     Results Review  I reviewed the patient's new clinical results.  Results from last 7 days   Lab Units 24  1458   WBC 10*3/mm3 4.04   HEMOGLOBIN g/dL 12.3*   PLATELETS 10*3/mm3 205       Results from last 7 days   Lab Units 24  1458   SODIUM mmol/L 135*   POTASSIUM mmol/L 4.8   CHLORIDE mmol/L 104   CO2 mmol/L 21.0*   BUN mg/dL 13   CREATININE mg/dL 0.88   GLUCOSE mg/dL 98       Lab Results  "  Component Value Date    ANIONGAP 10.0 01/06/2024     Estimated Creatinine Clearance: 84.6 mL/min (by C-G formula based on SCr of 0.88 mg/dL).   Lab Results   Component Value Date    EGFR 87.5 01/06/2024     Results from last 7 days   Lab Units 01/06/24  1458   ALBUMIN g/dL 3.4*       Results from last 7 days   Lab Units 01/06/24  1458   CALCIUM mg/dL 8.9   ALBUMIN g/dL 3.4*   PHOSPHORUS mg/dL 3.1         No results found for: \"HGBA1C\", \"POCGLU\"      No radiology results for the last day    Scheduled Meds  acetaminophen, 1,000 mg, Oral, TID  enoxaparin, 40 mg, Subcutaneous, Q24H  Lidocaine, 2 patch, Transdermal, Q24H  methotrexate, 2.5 mg, Oral, Once per day on Wed Thu  pantoprazole, 40 mg, Oral, Q AM  senna-docusate sodium, 2 tablet, Oral, BID  tamsulosin, 0.4 mg, Oral, Nightly    Continuous Infusions   PRN Meds    acetaminophen **OR** acetaminophen **OR** acetaminophen    senna-docusate sodium **AND** polyethylene glycol **AND** bisacodyl **AND** bisacodyl    calcium carbonate    Calcium Replacement - Follow Nurse / BPA Driven Protocol    Magnesium Standard Dose Replacement - Follow Nurse / BPA Driven Protocol    melatonin    muscle rub    ondansetron ODT **OR** ondansetron    Phosphorus Replacement - Follow Nurse / BPA Driven Protocol    Potassium Replacement - Follow Nurse / BPA Driven Protocol    [COMPLETED] Insert Peripheral IV **AND** sodium chloride    sodium chloride    sodium chloride     Diet  Diet: Cardiac Diets; Healthy Heart (2-3 Na+); Texture: Regular Texture (IDDSI 7); Fluid Consistency: Thin (IDDSI 0)    I have personally reviewed:  [x]  Medications  []  Laboratory   []  Microbiology   []  Radiology   []  EKG/Telemetry   []  Cardiology/Vascular   []  Pathology   []  Records      Assessment/Plan     Active Hospital Problems    Diagnosis  POA    **Generalized weakness [R53.1]  Yes    Severe malnutrition [E43]  Yes    Immobility [Z74.09]  Yes    Fall from bed [W06.XXXA]  Yes    Tetrahydrocannabinol " (THC) use disorder, mild, abuse [F12.10]  Yes    Generalized pain [R52]  Yes     [Z63.4]  Yes    Grief [F43.21]  Yes    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 male admitted with generalized weakness    Generalized weakness  Immobility  Fall from bed  Multifactorial secondary to age and chronic comorbidities  PT/OT following     Generalized pain  Disseminated idiopathic skeletal hyperostosis  Ankylosing spondylitis  X-ray of lumbar spine with diffuse ankylosis without any obvious injury or fracture.  methotrexate     Urinary retention  Constipation  catheter is out. Flomax added.  Urology evaluated  In the absence of urinary symptoms leukocytosis or fever no need for urinalysis at this time if he develops symptoms can check a UA with culture at that time.     Elevated CK, improved  Lactic acidosis, resolved  related to dehydration     Hypertension  Still with some low normal BPs and heart rate but not as low today (no SBPs in the 90s). Metoprolol remains on hold    DVT prophylaxis  Lovenox 40 mg SC daily    Discharge  Awaiting SNF  Expected Discharge Date: 1/3/2024; Expected Discharge Time:   I am beyond frustrated at the games the insurance company is playing at this point.  This gentleman's been waiting for a bed in rehab for over a week.  He remains medically stable with no active medical issues to address  Discussed with patient and nursing staff    Shola Haque MD  [facility]  01/07/24

## 2024-01-07 NOTE — PLAN OF CARE
Pt continued to wait for insurance approval. Sat in the chair for most of the day. No acute signs of distress noted. X2 side rails up, bed in low locked position, call light within reach.     Problem: Adult Inpatient Plan of Care  Goal: Plan of Care Review  Outcome: Ongoing, Progressing  Goal: Patient-Specific Goal (Individualized)  Outcome: Ongoing, Progressing  Goal: Absence of Hospital-Acquired Illness or Injury  Outcome: Ongoing, Progressing  Intervention: Identify and Manage Fall Risk  Recent Flowsheet Documentation  Taken 1/7/2024 1800 by Jessica Martin RN  Safety Promotion/Fall Prevention:   nonskid shoes/slippers when out of bed   safety round/check completed   clutter free environment maintained  Taken 1/7/2024 1600 by Jessica Martin RN  Safety Promotion/Fall Prevention:   nonskid shoes/slippers when out of bed   safety round/check completed   clutter free environment maintained  Taken 1/7/2024 1400 by Jessica Martin RN  Safety Promotion/Fall Prevention:   nonskid shoes/slippers when out of bed   safety round/check completed   clutter free environment maintained  Taken 1/7/2024 1200 by Jessica Martin RN  Safety Promotion/Fall Prevention:   nonskid shoes/slippers when out of bed   safety round/check completed   clutter free environment maintained  Taken 1/7/2024 1000 by Jessica Martin RN  Safety Promotion/Fall Prevention:   nonskid shoes/slippers when out of bed   safety round/check completed   clutter free environment maintained  Taken 1/7/2024 0800 by Jessica Martin RN  Safety Promotion/Fall Prevention:   nonskid shoes/slippers when out of bed   safety round/check completed   clutter free environment maintained  Intervention: Prevent Skin Injury  Recent Flowsheet Documentation  Taken 1/7/2024 1800 by Jessica Martin RN  Body Position: position changed independently  Taken 1/7/2024 1600 by Jessica Martin RN  Body Position: position changed independently  Taken 1/7/2024 1400 by Veronica  TERESA Lopez  Body Position: position changed independently  Taken 1/7/2024 1200 by Jessica Martin RN  Body Position: position changed independently  Taken 1/7/2024 1000 by Jessica Martin RN  Body Position: position changed independently  Taken 1/7/2024 0800 by Jessica Martin RN  Body Position: position changed independently  Intervention: Prevent and Manage VTE (Venous Thromboembolism) Risk  Recent Flowsheet Documentation  Taken 1/7/2024 1800 by Jessica Martin RN  Activity Management: up in chair  Taken 1/7/2024 1600 by Jessica Martin RN  Activity Management: up in chair  Taken 1/7/2024 1400 by Jessica Martin RN  Activity Management: up in chair  Taken 1/7/2024 1200 by Jessica Martin RN  Activity Management: up ad cassia  Taken 1/7/2024 1000 by Jessica Martin RN  Activity Management: up ad cassia  Taken 1/7/2024 0914 by Jessica Martin RN  VTE Prevention/Management: patient refused intervention  Range of Motion:   active ROM (range of motion) encouraged   ROM (range of motion) performed  Taken 1/7/2024 0800 by Jessica Martin RN  Activity Management: up ad cassia  Intervention: Prevent Infection  Recent Flowsheet Documentation  Taken 1/7/2024 1800 by Jessica Martin RN  Infection Prevention:   environmental surveillance performed   hand hygiene promoted   single patient room provided  Taken 1/7/2024 1600 by Jessica Martin RN  Infection Prevention:   environmental surveillance performed   hand hygiene promoted   single patient room provided  Taken 1/7/2024 1400 by Jessica Martin RN  Infection Prevention:   environmental surveillance performed   hand hygiene promoted   single patient room provided  Taken 1/7/2024 1200 by Jessica Martin RN  Infection Prevention:   environmental surveillance performed   hand hygiene promoted   single patient room provided  Taken 1/7/2024 1000 by Jessica Martin RN  Infection Prevention:   environmental surveillance performed   hand hygiene promoted   single patient room  provided  Taken 1/7/2024 0800 by Jessica Martin RN  Infection Prevention:   environmental surveillance performed   hand hygiene promoted   single patient room provided  Goal: Optimal Comfort and Wellbeing  Outcome: Ongoing, Progressing  Goal: Readiness for Transition of Care  Outcome: Ongoing, Progressing     Problem: Fall Injury Risk  Goal: Absence of Fall and Fall-Related Injury  Outcome: Ongoing, Progressing  Intervention: Promote Injury-Free Environment  Recent Flowsheet Documentation  Taken 1/7/2024 1800 by Jessica Martin RN  Safety Promotion/Fall Prevention:   nonskid shoes/slippers when out of bed   safety round/check completed   clutter free environment maintained  Taken 1/7/2024 1600 by Jessica Martin RN  Safety Promotion/Fall Prevention:   nonskid shoes/slippers when out of bed   safety round/check completed   clutter free environment maintained  Taken 1/7/2024 1400 by Jessica Martin RN  Safety Promotion/Fall Prevention:   nonskid shoes/slippers when out of bed   safety round/check completed   clutter free environment maintained  Taken 1/7/2024 1200 by Jessica Martin RN  Safety Promotion/Fall Prevention:   nonskid shoes/slippers when out of bed   safety round/check completed   clutter free environment maintained  Taken 1/7/2024 1000 by Jessica Martin RN  Safety Promotion/Fall Prevention:   nonskid shoes/slippers when out of bed   safety round/check completed   clutter free environment maintained  Taken 1/7/2024 0800 by Jessica Martin RN  Safety Promotion/Fall Prevention:   nonskid shoes/slippers when out of bed   safety round/check completed   clutter free environment maintained     Problem: Skin Injury Risk Increased  Goal: Skin Health and Integrity  Outcome: Ongoing, Progressing  Intervention: Optimize Skin Protection  Recent Flowsheet Documentation  Taken 1/7/2024 1800 by Jessica Martin RN  Head of Bed (HOB) Positioning: HOB elevated  Taken 1/7/2024 1600 by Jessica Martin RN  Head of  Bed (HOB) Positioning: HOB elevated  Taken 1/7/2024 1400 by Jessica Martin RN  Head of Bed (HOB) Positioning: HOB elevated  Taken 1/7/2024 1200 by Jessica Martin RN  Head of Bed (HOB) Positioning: HOB elevated  Taken 1/7/2024 1000 by Jessica Martin RN  Head of Bed (HOB) Positioning: HOB elevated  Taken 1/7/2024 0800 by Jessica Martin RN  Head of Bed (HOB) Positioning: HOB elevated  Goal: Skin Health and Integrity  Outcome: Ongoing, Progressing  Intervention: Optimize Skin Protection  Recent Flowsheet Documentation  Taken 1/7/2024 1800 by Jessica Martin RN  Head of Bed (HOB) Positioning: HOB elevated  Taken 1/7/2024 1600 by Jessica Martin RN  Head of Bed (HOB) Positioning: HOB elevated  Taken 1/7/2024 1400 by Jessica Martin RN  Head of Bed (HOB) Positioning: HOB elevated  Taken 1/7/2024 1200 by Jessica Martin RN  Head of Bed (HOB) Positioning: HOB elevated  Taken 1/7/2024 1000 by Jessica Martin RN  Head of Bed (HOB) Positioning: HOB elevated  Taken 1/7/2024 0800 by Jessica Martin RN  Head of Bed (HOB) Positioning: HOB elevated     Problem: Malnutrition  Goal: Improved Nutritional Intake  Outcome: Ongoing, Progressing   Goal Outcome Evaluation:

## 2024-01-07 NOTE — PLAN OF CARE
Goal Outcome Evaluation:  Plan of Care Reviewed With: patient        Progress: improving  Outcome Evaluation: pt is axox4. VSS. room air. pt denies pain. no compaints. precert pending.

## 2024-01-08 VITALS
DIASTOLIC BLOOD PRESSURE: 58 MMHG | OXYGEN SATURATION: 96 % | BODY MASS INDEX: 22.37 KG/M2 | HEART RATE: 52 BPM | TEMPERATURE: 98.2 F | WEIGHT: 179.9 LBS | SYSTOLIC BLOOD PRESSURE: 122 MMHG | HEIGHT: 75 IN | RESPIRATION RATE: 18 BRPM

## 2024-01-08 PROCEDURE — 97110 THERAPEUTIC EXERCISES: CPT

## 2024-01-08 PROCEDURE — 97116 GAIT TRAINING THERAPY: CPT

## 2024-01-08 PROCEDURE — 25010000002 ENOXAPARIN PER 10 MG: Performed by: HOSPITALIST

## 2024-01-08 RX ADMIN — PANTOPRAZOLE SODIUM 40 MG: 40 TABLET, DELAYED RELEASE ORAL at 05:28

## 2024-01-08 RX ADMIN — LIDOCAINE 2 PATCH: 4 PATCH TOPICAL at 08:50

## 2024-01-08 RX ADMIN — ENOXAPARIN SODIUM 40 MG: 100 INJECTION SUBCUTANEOUS at 13:50

## 2024-01-08 RX ADMIN — ACETAMINOPHEN 1000 MG: 500 TABLET ORAL at 08:49

## 2024-01-08 NOTE — THERAPY TREATMENT NOTE
Patient Name: Anthony Gallegos  : 1945    MRN: [id number]                              Today's Date: 2024       Admit Date: 2023    Visit Dx:     ICD-10-CM ICD-9-CM   1. Muscle pain  M79.10 729.1   2. Dizziness  R42 780.4   3. Immobility  Z74.09 799.89   4. Fall, initial encounter  W19.XXXA E888.9   5. Elevated CK  R74.8 790.5   6. Arthralgia, unspecified joint  M25.50 719.40   7. Ankylosing spondylitis of cervical region  M45.2 720.0     Patient Active Problem List   Diagnosis    Ankylosing spondylitis of cervical region    Recent unexplained weight loss    Abnormal serum lipase level    Abnormal serum level of amylase    Hypertension    Boil    Immobility    Fall from bed    Tetrahydrocannabinol (THC) use disorder, mild, abuse    Generalized pain        Grief    DISH (disseminated idiopathic skeletal hyperostosis)    Generalized weakness    Severe malnutrition     Past Medical History:   Diagnosis Date    Abscess of scrotum     Arthritis     AS (ankylosing spondylitis)     Hypertension     Tetrahydrocannabinol (THC) use disorder, mild, abuse 2023    Uveitis      Past Surgical History:   Procedure Laterality Date    COLONOSCOPY      ROTATOR CUFF REPAIR Right     TOTAL HIP ARTHROPLASTY Left       General Information       Row Name 24 1105          Physical Therapy Time and Intention    Document Type therapy note (daily note)  -EB     Mode of Treatment individual therapy;physical therapy  -EB       Row Name 24 1105          General Information    Patient Profile Reviewed yes  -EB     Existing Precautions/Restrictions fall  -EB       Row Name 24 1105          Cognition    Orientation Status (Cognition) oriented x 4  -EB       Row Name 24 1105          Safety Issues, Functional Mobility    Impairments Affecting Function (Mobility) balance;endurance/activity tolerance;strength  -EB               User Key  (r) = Recorded By, (t) = Taken By, (c) = Cosigned By       Initials Name Provider Type    EB Cady Tanner PTA Physical Therapist Assistant                   Mobility       Row Name 01/08/24 1105          Bed Mobility    Comment, (Bed Mobility) NT-UIC  -EB       Row Name 01/08/24 1105          Sit-Stand Transfer    Sit-Stand [place] (Transfers) minimum assist (75% patient effort)  -EB     Assistive Device (Sit-Stand Transfers) walker, front-wheeled  -EB     Comment, (Sit-Stand Transfer) cues for hand placement when standing from recliner.  -EB       Row Name 01/08/24 1105          Gait/Stairs (Locomotion)    [place] Level (Gait) minimum assist (75% patient effort)  -EB     Assistive Device (Gait) walker, front-wheeled  -EB     Distance in Feet (Gait) 40ft  -EB     Deviations/Abnormal Patterns (Gait) ranjit decreased;gait speed decreased;stride length decreased  -EB     Bilateral Gait Deviations forward flexed posture;heel strike decreased  -EB     Comment, (Gait/Stairs) pt with very slow gait pace. cues for posture and pt to keep head up. Safety cues for pt to complete turns before sitting in chair  -EB               User Key  (r) = Recorded By, (t) = Taken By, (c) = Cosigned By      Initials Name Provider Type    Cady Montenegro PTA Physical Therapist Assistant                   Obj/Interventions       Row Name 01/08/24 1108          Motor Skills    Therapeutic Exercise --  BLE: LAQs and seated marches (X10)  -EB               User Key  (r) = Recorded By, (t) = Taken By, (c) = Cosigned By      Initials Name Provider Type    Cady Montenegro PTA Physical Therapist Assistant                   Goals/Plan    No documentation.                  Clinical Impression       Row Name 01/08/24 1108          Pain    Pain Location - Side/Orientation Right  -EB     Pain Location - knee  -EB       Row Name 01/08/24 1108          Plan of Care Review    Plan of Care Reviewed With patient  -EB     Progress improving  -EB     Outcome Evaluation Pt seen for PT treatment today.  Pt completed artur LE execises while seated in the chair with min/mod c/o right knee pain. Pt is Malgorzata with STS transfers needing verbal cues for hand placement and posture. Pt still tends to lean posteriorly when coming to a stand. Pt ambulated about 40ft with rwx, Malgorzata. Pt needs cues for upright posture and safety with completing turn before sitting in the chair. Pt with very slow gait pace. Pt will need SNF at d/c. Will continue to follow pt for d/c needs and progress as able.  -EB       Row Name 01/08/24 1108          Therapy Assessment/Plan (PT)    Therapy Frequency (PT) 5 times/wk  -EB       Row Name 01/08/24 1108          Positioning and Restraints    Pre-Treatment Position sitting in chair/recliner  -EB     Post Treatment Position chair  -EB     In Chair sitting;call light within reach;encouraged to call for assist;exit alarm on  -EB               User Key  (r) = Recorded By, (t) = Taken By, (c) = Cosigned By      Initials Name Provider Type    Cady Montenegro PTA Physical Therapist Assistant                   Outcome Measures       Row Name 01/08/24 1112 01/08/24 0900       How much help from another person do you currently need...    Turning from your back to your side while in flat bed without using bedrails? 3  -EB 4  -HE    Moving from lying on back to sitting on the side of a flat bed without bedrails? 3  -EB 3  -HE    Moving to and from a bed to a chair (including a wheelchair)? 3  -EB 3  -HE    Standing up from a chair using your arms (e.g., wheelchair, bedside chair)? 3  -EB 3  -HE    Climbing 3-5 steps with a railing? 2  -EB 2  -HE    To walk in hospital room? 3  -EB 3  -HE    AM-PAC 6 Clicks Score (PT) 17  -EB 18  -HE    Highest Level of Mobility Goal 5 --> Static standing  -EB 6 --> Walk 10 steps or more  -HE              User Key  (r) = Recorded By, (t) = Taken By, (c) = Cosigned By      Initials Name Provider Type    Cady Montenegro PTA Physical Therapist Assistant    Jessica Lazo RN  Registered Nurse                                 Physical Therapy Education       Title: PT OT SLP Therapies (Done)       Topic: Physical Therapy (Done)       Point: Mobility training (Done)       Learning Progress Summary             Patient Acceptance, E,D, VU,DU by EB at 1/8/2024 1112    Acceptance, E, VU by BM at 1/7/2024 0457    Acceptance, E, VU by EB at 1/5/2024 1054    Acceptance, E, VU by ER at 1/3/2024 1053    Acceptance, E,TB, VU,NR by EE at 1/2/2024 1554    Acceptance, E, VU,NR by MIA at 12/31/2023 1437    Acceptance, E,TB,D, VU,NR by AMELIA at 12/29/2023 1130    Acceptance, E, VU by SHLOMO at 12/27/2023 1554    Acceptance, E,TB, VU by KRISTEN at 12/26/2023 1029    Acceptance, E,TB, VU by  at 12/23/2023 1618    Eager, E,TB,D, VU,NR by AMELIA at 12/22/2023 1700    Acceptance, E, VU by CLARISSA at 12/21/2023 1713                         Point: Home exercise program (Done)       Learning Progress Summary             Patient Acceptance, E,D, VU,DU by EB at 1/8/2024 1112    Acceptance, E, VU by BM at 1/7/2024 0457    Acceptance, E, VU by EB at 1/5/2024 1054    Acceptance, E, VU by ER at 1/3/2024 1053    Acceptance, E, VU,NR by MIA at 12/31/2023 1437    Acceptance, E,TB,D, VU,NR by AMELIA at 12/29/2023 1130    Acceptance, E,TB, VU by KRISTEN at 12/26/2023 1029    Acceptance, E,TB, VU by  at 12/23/2023 1618    Eager, E,TB,D, VU,NR by AMELIA at 12/22/2023 1700    Acceptance, E, VU by CLARISSA at 12/21/2023 1713                         Point: Body mechanics (Done)       Learning Progress Summary             Patient Acceptance, E,D, VU,DU by EB at 1/8/2024 1112    Acceptance, E, VU by BM at 1/7/2024 0457    Acceptance, E, VU by EB at 1/5/2024 1054    Acceptance, E, VU by ER at 1/3/2024 1053    Acceptance, E,TB, VU,NR by EE at 1/2/2024 1554    Acceptance, E, VU,NR by CN at 12/31/2023 1437    Acceptance, E,TB,D, VU,NR by JM at 12/29/2023 1130    Acceptance, E, VU by LW at 12/27/2023 1554    Acceptance, E,TB, VU by RD at 12/26/2023 1029    Acceptance,  E,TB, VU by  at 12/23/2023 1618    Eager, E,TB,D, VU,NR by AMELIA at 12/22/2023 1700    Acceptance, E, VU by CLARISSA at 12/21/2023 1713                         Point: Precautions (Done)       Learning Progress Summary             Patient Acceptance, E,D, VU,DU by EB at 1/8/2024 1112    Acceptance, E, VU by BM at 1/7/2024 0457    Acceptance, E, VU by EB at 1/5/2024 1054    Acceptance, E, VU by ER at 1/3/2024 1053    Acceptance, E,TB, VU,NR by EE at 1/2/2024 1554    Acceptance, E, VU,NR by CN at 12/31/2023 1437    Acceptance, E,TB,D, VU,NR by AMELIA at 12/29/2023 1130    Acceptance, E, VU by LW at 12/27/2023 1554    Acceptance, E,TB, VU by KRISTEN at 12/26/2023 1029    Acceptance, E,TB, VU by  at 12/23/2023 1618    Eager, E,TB,D, VU,NR by AMELIA at 12/22/2023 1700    Acceptance, E, VU by CLARISSA at 12/21/2023 1713                                         User Key       Initials Effective Dates Name Provider Type Discipline    RD 06/16/21 -  Mariposa Kennedy, PT Physical Therapist PT    EE 06/16/21 -  Lo Grajeda, PT Physical Therapist PT    JM 03/07/18 -  Rosa Portillo, PTA Physical Therapist Assistant PT    CN 06/16/21 -  Kirsty Mariscal, PT Physical Therapist PT    EB 02/14/23 -  Cady Tanner, TULIO Physical Therapist Assistant PT    BM 06/16/21 -  Machelle Tucker, RN Registered Nurse Nurse    CLARISSA 06/16/21 -  Felipa Weathers, PT Physical Therapist PT    LW 05/08/23 -  Ashley Bertrand, PT Physical Therapist PT     05/24/23 -  Leobardo Weller, PT Physical Therapist PT    ER 10/15/23 -  Sonia Fajardo, PT Physical Therapist PT                  PT Recommendation and Plan     Plan of Care Reviewed With: patient  Progress: improving  Outcome Evaluation: Pt seen for PT treatment today. Pt completed artur LE execises while seated in the chair with min/mod c/o right knee pain. Pt is Malgorzata with STS transfers needing verbal cues for hand placement and posture. Pt still tends to lean posteriorly when coming to a stand. Pt ambulated about 40ft with  rwx, [place]gor[place]. Pt needs cues for upright posture and safety with completing turn before sitting in the chair. Pt with very slow gait pace. Pt will need SNF at d/c. Will continue to follow pt for d/c needs and progress as able.     Time Calculation:         PT Charges       Row Name 01/08/24 1105             Time Calculation    Start Time 0920  -EB      Stop Time 0947  -EB      Time Calculation (min) 27 min  -EB      PT Received On 01/08/24  -EB      PT - Next Appointment 01/09/24  -EB         Time Calculation- PT    Total Timed Code Minutes- PT 27 minute(s)  -EB                User Key  (r) = Recorded By, (t) = Taken By, (c) = Cosigned By      Initials Name Provider Type     Cady Tanner PTA Physical Therapist Assistant                  Therapy Charges for Today       Code Description Service Date Service Provider Modifiers Qty    49239755955 HC GAIT TRAINING EA 15 MIN 1/8/2024 Cady Tanner PTA GP 1    48781896382 HC PT THER PROC EA 15 MIN 1/8/2024 Cady Tanner PTA GP 1            PT G-Codes  Outcome Measure Options: AM-PAC 6 Clicks Basic Mobility (PT)  AM-PAC 6 Clicks Score (PT): 17  AM-PAC 6 Clicks Score (OT): 14  Modified [place] Scale: 4 - Moderately severe disability.  Unable to walk without assistance, and unable to attend to own bodily needs without assistance.       Cady Tanner PTA  1/8/2024

## 2024-01-08 NOTE — CASE MANAGEMENT/SOCIAL WORK
Continued Stay Note  [facility]     Patient Name: Anthony Gallegos  MRN: [id number]  Today's Date: 1/8/2024    Admit Date: 12/20/2023    Plan: [facility], pre-cert pending.   Discharge Plan       Row Name 01/08/24 1030       Plan    Plan [facility], pre-cert pending.    Plan Comments Spoke with Misael/ [facility] acute authorizations, working on pre-cert. Spoke with Janusz/ [facility] bed available awaiting pre-cert.                   Discharge Codes    No documentation.                 Expected Discharge Date and Time       Expected Discharge Date Expected Discharge Time    Jf 3, 2024               Mariposa Orozco RN

## 2024-01-08 NOTE — DISCHARGE SUMMARY
Patient Name: Anthony Gallegos  : 1945  MRN: [id number]    Date of Admission: 2023  Date of Discharge:  2024  Primary Care Physician: Marco Macedo MD      Chief Complaint:   Leg Pain      Discharge Diagnoses     Active Hospital Problems    Diagnosis  POA   • **Generalized weakness [R53.1]  Yes   • Severe malnutrition [E43]  Yes   • Immobility [Z74.09]  Yes   • Fall from bed [W06.XXXA]  Yes   • Tetrahydrocannabinol (THC) use disorder, mild, abuse [F12.10]  Yes   • Generalized pain [R52]  Yes   •  [Z63.4]  Yes   • Grief [F43.21]  Yes   • DISH (disseminated idiopathic skeletal hyperostosis) [M48.10]  Yes   • Hypertension [I10]  Yes   • Ankylosing spondylitis of cervical region [M45.2]  Yes      Resolved Hospital Problems    Diagnosis Date Resolved POA   • Urine retention [R33.9] 2023 Yes   • Constipation [K59.00] 2023 Yes        Hospital Course     Mr. Gallegos is a 78 y.o. male with a history of DISH, hypertension, ankylosing spondylitis and impaired mobility who presented to [facility] initially complaining of leg pain.  Please see the admitting history and physical for further details.  He was found to have generalized weakness following a fall with constipation and urinary retention and was admitted to the hospital for further evaluation and treatment.  He was admitted to the hospital hydrated and treated.  His urinary retention resolved after Flomax was initiated and plans to follow-up with urology in the outpatient setting.  Physical therapy worked with the patient here in the hospital and recommendations are for discharge to skilled nursing facility.  He is doing well on p.o. medications and pain and symptoms are managed.  His hospitalization has been prolonged due to delays from the insurance company and the holidays.  He is stable to discharge once his pre-CERT is obtained.    Day of Discharge     Subjective:  No new issues or complaints today.  Sitting up  in the bed no distress    Physical Exam:  Temp:  [97.5 °F (36.4 °C)-98.9 °F (37.2 °C)] 98.4 °F (36.9 °C)  Heart Rate:  [60-66] 60  Resp:  [18] 18  BP: (108-133)/(54-70) 119/57  Body mass index is 22.49 kg/m².  Physical Exam  Constitutional:       General: He is not in acute distress.     Appearance: Normal appearance. He is ill-appearing.      Comments: Chronic ill-appearing   Cardiovascular:      Rate and Rhythm: Normal rate and regular rhythm.   Pulmonary:      Effort: No respiratory distress.      Breath sounds: Normal breath sounds.   Abdominal:      General: Bowel sounds are normal. There is no distension.      Palpations: Abdomen is soft.   Neurological:      Mental Status: He is alert.         Consultants     Consult Orders (all) (From admission, onward)       Start     Ordered    12/24/23 1607  Inpatient Case Management  Consult  Once        Provider:  (Not yet assigned)    12/24/23 1606    12/24/23 1101  Inpatient Case Management  Consult  Once,   Status:  Canceled        Provider:  (Not yet assigned)    12/24/23 1100    12/21/23 1910  Inpatient Urology Consult  Once        Specialty:  Urology  Provider:  Walter Jimenez Jr., MD    12/21/23 1911 12/20/23 1747  Inpatient Access Center Consult  Once        Provider:  (Not yet assigned)    12/20/23 1747    12/20/23 1742  Inpatient Spiritual Care Consult  Once        Provider:  (Not yet assigned)    12/20/23 1747    12/20/23 1742  Inpatient Nutrition Consult  Once        Comments: Per pt   Provider:  (Not yet assigned)    12/20/23 1747    12/20/23 1741  Inpatient Consult to Case Management   Once        Provider:  (Not yet assigned)    12/20/23 1747    12/20/23 1427  [facility] (on-call MD unless specified) Details  Once,   Status:  Canceled        Specialty:  Hospitalist  Provider:  (Not yet assigned)    12/20/23 1426                  Procedures     * Surgery not found *    Imaging Results (All)       Procedure  Component Value Units Date/Time    XR Foot 3+ View Left [[id number]] Collected: 12/20/23 2218     Updated: 12/21/23 1430    Narrative:      LEFT FOOT     HISTORY: Left foot pain, swelling.     COMPARISON: None.     FINDINGS: 3 views of the left foot were obtained. The study is limited  somewhat by patient positioning. There is no evidence of fracture or  dislocation. There is no evidence of periosteal reaction.     This report was finalized on 12/21/2023 2:27 PM by Dr. Shola Mccracken M.D on Workstation: [id number]       XR Spine Lumbar Complete 4+VW [[id number]] Collected: 12/21/23 1228     Updated: 12/21/23 1238    Narrative:      XR SPINE LUMBAR COMPLETE 4+VW-     INDICATION: Ankylosing spondylitis post fall     COMPARISON: CT abdomen pelvis 12/20/2023       Impression:      Ankylosis throughout the lumbar spine and bilateral  sacroiliac joints consistent with known ankylosing spondylitis. No  subluxation. Vertebral body heights are maintained. No appreciable  interruption of the bridging syndesmophytes. If there is ongoing  clinical concern for fracture, given background of extensive ankylosing  spondylitis limiting evaluation, consider further evaluation with CT.     This report was finalized on 12/21/2023 12:35 PM by Dr. Landon Vaca M.D on Workstation: [id number]       CT Head Without Contrast [[id number]] Collected: 12/20/23 1358     Updated: 12/20/23 1750    Narrative:      EMERGENCY CT SCAN OF THE HEAD WITHOUT CONTRAST ON 12/20/2023     CLINICAL HISTORY: Dizziness. The patient personally reports dizziness  and syncope, also has some abdominal pain and weakness.     TECHNIQUE: Spiral CT images were obtained from the base of the skull to  the vertex without intravenous contrast. The images were reformatted and  submitted in 3 mm thick axial, sagittal and coronal CT sections with  brain algorithm.     There are no prior head CTs or MRIs of the brain for comparison.     FINDINGS: There is some patchy low-density  in the periventricular white  matter most pronounced in the frontal periventricular white matter with  some extension into the subcortical white matter consistent with  mild-to-moderate small vessel disease. The remainder of the brain  parenchyma is normal in attenuation. There is diffuse cerebral atrophy.  The lateral and third ventricles are upper limits of normal in size. I  see no mass effect, no midline shift and no extra-axial fluid  collections are identified. There is no evidence of acute intracranial  hemorrhage. The calvarium and the skull base are normal in appearance.  The paranasal sinuses, mastoid air cells and middle ear cavities are  clear.  The visualized superior halves of the orbits are normal in  appearance. There are osteoarthritic changes in the temporomandibular  joints with marginal spurring off the mandibular heads bilaterally.  There are arthritic changes at the atlantoaxial articulation.       Impression:      1. No convincing acute intracranial abnormality is identified.  2. There is mild-to-moderate small vessel disease in the cerebral white  matter and diffuse cerebral atrophy and osteoarthritic changes at the  temporomandibular joints bilaterally. The remainder the head CT is  normal and the etiology of this patient's dizziness is not established  on this exam. If there remains any clinical suspicion of an acute stroke  causing the dizziness, an MRI of the brain could be obtained for more  complete assessment.     Radiation dose reduction techniques were utilized, including automated  exposure control and exposure modulation based on body size.           This report was finalized on 12/20/2023 5:47 PM by Dr. Sharif Bermeo M.D  on Workstation: [id number]       CT Abdomen Pelvis Without Contrast [[id number]] Collected: 12/20/23 1318     Updated: 12/20/23 1329    Narrative:      CT ABDOMEN PELVIS WO CONTRAST-     HISTORY: Flank pain, dizziness, syncope, weakness.     TECHNIQUE:  CT of the  abdomen and pelvis was performed without  intravenous contrast. Evaluation of solid organs and vasculature is  limited without intravenous contrast. Radiation dose reduction  techniques were utilized, including automated exposure control and  exposure modulation based on body size.     COMPARISON: CT abdomen and pelvis 7/7/2017     FINDINGS:     Heart size is normal. There is no pericardial effusion. There is minimal  dependent atelectasis and or scarring in the posterior lung bases.  Pleural spaces are clear.  The liver is normal in size. The gallbladder is present. The spleen is  normal in size. There are no pancreatic calcifications. The adrenal  glands are within normal limits. There is a 5.4 cm cyst in the inferior  right kidney, which is increased in size from 2017, previously 3.1 cm  when remeasured. There are no renal stones or hydronephrosis.  There is moderate calcific atherosclerosis. Evaluation of abdominal and  pelvic lymph nodes is limited by lack of intravenous and oral contrast  and paucity of intra-abdominal/pelvic fat.  There is no bowel obstruction. There is a moderate predominately  right-sided colonic stool burden. The appendix is visualized. The  bladder is markedly distended to the level of the umbilicus. The  prostate is enlarged.  There is a partially imaged left hip total arthroplasty. Metallic streak  artifact limits evaluation of adjacent structures. There is diffuse  osseous demineralization. There is ventral flowing syndesmophytes  throughout the spine, consistent with diffuse idiopathic skeletal  hyperostosis (DISH).          Impression:         1.  Marked bladder distention. Correlate for possible urinary retention.  Consider Mariscal catheter decompression.  2.  No renal stones or hydronephrosis.  3.  Moderate stool burden.        This report was finalized on 12/20/2023 1:26 PM by Dr. Lo Tello M.D  on Workstation: [id number]       XR Chest 1 View [[id number]] Collected: 12/20/23 9335  "    Updated: 12/20/23 1109    Narrative:      XR CHEST 1 VW-12/20/2023     HISTORY: Back pain. Weakness.     Heart size is within normal limits. Lungs appear free of acute  infiltrates. Bones and soft tissues are unremarkable. Chest appears  unchanged from the 6/21/2017 study.       Impression:      1. No acute process.        This report was finalized on 12/20/2023 11:06 AM by Dr. Dario Dumont M.D on Workstation: [id number]             Results for orders placed during the hospital encounter of 12/20/23    Duplex Venous Lower Extremity - Left CAR    Interpretation Summary  •  Normal left lower extremity venous duplex scan.      Pertinent Labs     Results from last 7 days   Lab Units 01/06/24  1458   WBC 10*3/mm3 4.04   HEMOGLOBIN g/dL 12.3*   PLATELETS 10*3/mm3 205     Results from last 7 days   Lab Units 01/06/24  1458   SODIUM mmol/L 135*   POTASSIUM mmol/L 4.8   CHLORIDE mmol/L 104   CO2 mmol/L 21.0*   BUN mg/dL 13   CREATININE mg/dL 0.88   GLUCOSE mg/dL 98   EGFR mL/min/1.73 87.5     Results from last 7 days   Lab Units 01/06/24  1458   ALBUMIN g/dL 3.4*     Results from last 7 days   Lab Units 01/06/24  1458   CALCIUM mg/dL 8.9   ALBUMIN g/dL 3.4*   PHOSPHORUS mg/dL 3.1               Invalid input(s): \"LDLCALC\"          Test Results Pending at Discharge       Discharge Details        Discharge Medications        New Medications        Instructions Start Date   atenolol 50 MG tablet  Commonly known as: TENORMIN  Replaces: atenolol-chlorthalidone 50-25 MG per tablet   50 mg, Oral, Every 24 Hours Scheduled      Lidocaine 4 %   2 patches, Transdermal, Every 24 Hours Scheduled, Remove & Discard patch within 12 hours or as directed by MD      muscle rub 10-15 % cream cream   Topical, 4 Times Daily PRN      pantoprazole 40 MG EC tablet  Commonly known as: PROTONIX   40 mg, Oral, Every Early Morning      tamsulosin 0.4 MG capsule 24 hr capsule  Commonly known as: FLOMAX   0.4 mg, Oral, Nightly             Continue " These Medications        Instructions Start Date   esomeprazole 20 MG capsule  Commonly known as: nexIUM   20 mg, Oral, Daily PRN      methotrexate 2.5 MG tablet   Take 1 tablet by mouth 2 (Two) Times a Week. Pt takes every Wednesday and thursday      Naproxen Sodium 220 MG capsule   220 mg of amoxicillin, Oral, Daily      OMEGA 3 PO   1 capsule, Oral, Daily             Stop These Medications      atenolol-chlorthalidone 50-25 MG per tablet  Commonly known as: TENORETIC  Replaced by: atenolol 50 MG tablet              No Known Allergies    Discharge Disposition:  Skilled Nursing Facility (SD - External)      Discharge Diet:  Diet Order   Procedures   • Diet: Cardiac Diets; Healthy Heart (2-3 Na+); Texture: Regular Texture (IDDSI 7); Fluid Consistency: Thin (IDDSI 0)       Discharge Activity:   Activity Instructions       Activity as Tolerated              CODE STATUS:    Code Status and Medical Interventions:   Ordered at: 12/20/23 1748     Code Status (Patient has no pulse and is not breathing):    CPR (Attempt to Resuscitate)     Medical Interventions (Patient has pulse or is breathing):    Full       No future appointments.  Additional Instructions for the Follow-ups that You Need to Schedule       Ambulatory Referral to Home Health (Hospital)   As directed      Face to Face Visit Date: 12/24/2023   Follow-up provider for Plan of Care?: I treated the patient in an acute care facility and will not continue treatment after discharge.   Follow-up provider: MARCO MACEDO [[id number]]   Reason/Clinical Findings: debility   Describe mobility limitations that make leaving home difficult: debility; ankylosing spond.   Nursing/Therapeutic Services Requested: Physical Therapy Occupational Therapy   PT orders: Strengthening Therapeutic exercise   Occupational orders: Strengthening   Frequency: 1 Week 1               Contact information for follow-up providers       Marco Macedo MD .    Specialty: Family Medicine  Contact  information:  [address]  [address]  [facility] [address]  [phone]                       Contact information for after-discharge care       Destination       [facility] .    Service: Skilled Nursing  Contact information:  [address]  [facility] [address] [phone]                                   Additional Instructions for the Follow-ups that You Need to Schedule       Ambulatory Referral to Home Health (Hospital)   As directed      Face to Face Visit Date: 12/24/2023   Follow-up provider for Plan of Care?: I treated the patient in an acute care facility and will not continue treatment after discharge.   Follow-up provider: BRANDIE SNYDER [[id number]]   Reason/Clinical Findings: debility   Describe mobility limitations that make leaving home difficult: debility; ankylosing spond.   Nursing/Therapeutic Services Requested: Physical Therapy Occupational Therapy   PT orders: Strengthening Therapeutic exercise   Occupational orders: Strengthening   Frequency: 1 Week 1            Time Spent on Discharge:  Greater than 30 minutes      Shola Haque MD  [facility]  01/08/24  12:51 EST

## 2024-01-08 NOTE — SIGNIFICANT NOTE
01/08/24 1417   Post Acute Pre-Cert Documentation   Authorization Number: [id number]  (APPROVED PER JOAQUÍN SShanelle BRAND/REILLY)   Post Acute Pre-Cert Initiated Comment ALIREZA REECE

## 2024-01-08 NOTE — PLAN OF CARE
Goal Outcome Evaluation:  Plan of Care Reviewed With: patient        Progress: improving  Outcome Evaluation: Pt seen for PT treatment today. Pt completed artur LE execises while seated in the chair with min/mod c/o right knee pain. Pt is Malgorzata with STS transfers needing verbal cues for hand placement and posture. Pt still tends to lean posteriorly when coming to a stand. Pt ambulated about 40ft with rwx, Malgorzata. Pt needs cues for upright posture and safety with completing turn before sitting in the chair. Pt with very slow gait pace. Pt will need SNF at d/c. Will continue to follow pt for d/c needs and progress as able.

## 2024-01-08 NOTE — SIGNIFICANT NOTE
01/08/24 1225   Post Acute Pre-Cert Documentation   Request Submitted by Facility - Type: Post Acute  (PRE-CERT INITIATED BY PATRICIA SILVA PER ZACKERY MEREDITH)   Authorization Number: PENDING PER [employer]ET[employer]   Post Acute Pre-Cert Initiated Comment ALIREZA REECE

## 2024-01-08 NOTE — CASE MANAGEMENT/SOCIAL WORK
Continued Stay Note  [facility]     Patient Name: Anthony Gallegos  MRN: [id number]  Today's Date: 1/8/2024    Admit Date: 12/20/2023    Plan: [facility] via family transport.   Discharge Plan       Row Name 01/08/24 1719       Plan    Plan [facility] via family transport.    Patient/Family in Agreement with Plan yes    Plan Comments Spoke with [facility] patient bed available today. Pre-cert authorized. Spoke with patient's daughter voiced agreeance to discharge plan, stated that patient's son will transport to SNF. Packet given to RN.                   Discharge Codes    No documentation.                 Expected Discharge Date and Time       Expected Discharge Date Expected Discharge Time    Jf 3, 2024               Mariposa Orozco RN

## 2024-01-08 NOTE — NURSING NOTE
Attempted to call [facility] for report, no answer. Left voice message with call back number. Will try again shortly.

## 2024-01-08 NOTE — NURSING NOTE
Attempted to call all nurses stations. No answer at any stations. Left voice mails at each with call back number. Pt has left with son.

## 2024-01-08 NOTE — PLAN OF CARE
Pt is being discharged to [facility] for rehab. Discharge instructions and education given to patient and son at bedside. All questions and concerns answered and addressed. Son is at bedside to provide transportation to rehab facility via private vehicle. Pt requested to eat dinner before leaving. Will request hospital transport to escort patient to discharge vehicle via wheelchair. No acute signs of distress noted.     Problem: Adult Inpatient Plan of Care  Goal: Plan of Care Review  Outcome: Adequate for Care Transition  Goal: Patient-Specific Goal (Individualized)  Outcome: Adequate for Care Transition  Goal: Absence of Hospital-Acquired Illness or Injury  Outcome: Adequate for Care Transition  Intervention: Identify and Manage Fall Risk  Recent Flowsheet Documentation  Taken 1/8/2024 1600 by Jessica Martin RN  Safety Promotion/Fall Prevention:   nonskid shoes/slippers when out of bed   safety round/check completed   clutter free environment maintained  Taken 1/8/2024 1400 by Jessica Martin RN  Safety Promotion/Fall Prevention:   nonskid shoes/slippers when out of bed   safety round/check completed   clutter free environment maintained  Taken 1/8/2024 1200 by Jessica Martin RN  Safety Promotion/Fall Prevention:   nonskid shoes/slippers when out of bed   safety round/check completed   clutter free environment maintained  Taken 1/8/2024 1000 by Jessica Martin RN  Safety Promotion/Fall Prevention:   nonskid shoes/slippers when out of bed   safety round/check completed   clutter free environment maintained  Intervention: Prevent Skin Injury  Recent Flowsheet Documentation  Taken 1/8/2024 1600 by Jessica Martin RN  Body Position: position changed independently  Taken 1/8/2024 1400 by Jessica Martin RN  Body Position: position changed independently  Taken 1/8/2024 1200 by Jessica Martin RN  Body Position: position changed independently  Taken 1/8/2024 1000 by Jessica Martin RN  Body Position: position  changed independently  Intervention: Prevent and Manage VTE (Venous Thromboembolism) Risk  Recent Flowsheet Documentation  Taken 1/8/2024 1600 by Jsesica Martin RN  Activity Management: up in chair  Taken 1/8/2024 1400 by Jessica Martin RN  Activity Management: up in chair  Taken 1/8/2024 1200 by Jessica Martin RN  Activity Management: up in chair  Taken 1/8/2024 1000 by Jessica Martin RN  Activity Management: up ad cassia  Taken 1/8/2024 0900 by Jessica Martin RN  VTE Prevention/Management: patient refused intervention  Range of Motion:   active ROM (range of motion) encouraged   ROM (range of motion) performed  Intervention: Prevent Infection  Recent Flowsheet Documentation  Taken 1/8/2024 1600 by Jessica Martin RN  Infection Prevention:   environmental surveillance performed   hand hygiene promoted   single patient room provided  Taken 1/8/2024 1400 by Jessica Martin RN  Infection Prevention:   environmental surveillance performed   hand hygiene promoted   single patient room provided  Taken 1/8/2024 1200 by Jessica Martin RN  Infection Prevention:   environmental surveillance performed   hand hygiene promoted   single patient room provided  Taken 1/8/2024 1000 by Jessica Martin RN  Infection Prevention:   environmental surveillance performed   hand hygiene promoted   single patient room provided  Goal: Optimal Comfort and Wellbeing  Outcome: Adequate for Care Transition  Goal: Readiness for Transition of Care  Outcome: Adequate for Care Transition     Problem: Fall Injury Risk  Goal: Absence of Fall and Fall-Related Injury  Outcome: Adequate for Care Transition  Intervention: Promote Injury-Free Environment  Recent Flowsheet Documentation  Taken 1/8/2024 1600 by Jessica Martin RN  Safety Promotion/Fall Prevention:   nonskid shoes/slippers when out of bed   safety round/check completed   clutter free environment maintained  Taken 1/8/2024 1400 by Jessica Martin RN  Safety Promotion/Fall  Prevention:   nonskid shoes/slippers when out of bed   safety round/check completed   clutter free environment maintained  Taken 1/8/2024 1200 by Jessica Martin RN  Safety Promotion/Fall Prevention:   nonskid shoes/slippers when out of bed   safety round/check completed   clutter free environment maintained  Taken 1/8/2024 1000 by Jessica Martin RN  Safety Promotion/Fall Prevention:   nonskid shoes/slippers when out of bed   safety round/check completed   clutter free environment maintained     Problem: Skin Injury Risk Increased  Goal: Skin Health and Integrity  Outcome: Adequate for Care Transition  Intervention: Optimize Skin Protection  Recent Flowsheet Documentation  Taken 1/8/2024 1600 by Jessica Martin RN  Head of Bed (HOB) Positioning: HOB elevated  Taken 1/8/2024 1400 by Jessica Martin RN  Head of Bed (HOB) Positioning: HOB elevated  Taken 1/8/2024 1200 by Jessica Martin RN  Head of Bed (HOB) Positioning: HOB elevated  Taken 1/8/2024 1000 by Jessica Martin RN  Head of Bed (HOB) Positioning: HOB elevated  Goal: Skin Health and Integrity  Outcome: Adequate for Care Transition  Intervention: Optimize Skin Protection  Recent Flowsheet Documentation  Taken 1/8/2024 1600 by Jessica Martin RN  Head of Bed (HOB) Positioning: HOB elevated  Taken 1/8/2024 1400 by Jessica Martin RN  Head of Bed (HOB) Positioning: HOB elevated  Taken 1/8/2024 1200 by Jessica Martin RN  Head of Bed (HOB) Positioning: HOB elevated  Taken 1/8/2024 1000 by Jessica Martin RN  Head of Bed (HOB) Positioning: HOB elevated     Problem: Malnutrition  Goal: Improved Nutritional Intake  Outcome: Adequate for Care Transition   Goal Outcome Evaluation:

## 2024-01-08 NOTE — SIGNIFICANT NOTE
01/08/24 1543   Post Acute Pre-Cert Documentation   Request Submitted by Facility - Type: Post Acute   Post-Acute Authorization Type Submitted: SNF   Date Post Acute Pre-Cert Completed 01/08/24   Accepting Facility [facility]   Hospital Discharge Date Requested 01/08/24   All Clinicals Submitted? Yes   Had Accepting Facility at Time of Submission Yes   Response Received from Insurance? Approval   Response Communicated to: ;Accepting Facility Auth Department;Accepting Facility Liaison   Authorization Number: [id number]   Post Acute Pre-Cert Initiated Comment ALIREZA REECE        01/08/24 1543   Post Acute Pre-Cert Documentation   Request Submitted by Facility - Type: Post Acute   Post-Acute Authorization Type Submitted: SNF   Date Post Acute Pre-Cert Completed 01/08/24   Accepting Facility [facility]   Hospital Discharge Date Requested 01/08/24   All Clinicals Submitted? Yes   Had Accepting Facility at Time of Submission Yes   Response Received from Insurance? Approval   Response Communicated to: ;Accepting Facility Auth Department;Accepting Facility Liaison   Authorization Number: [id number]   Post Acute Pre-Cert Initiated Comment ALIREZA REECE

## 2024-01-08 NOTE — PLAN OF CARE
Goal Outcome Evaluation:  VSS. No complaints of pain overnight. Pills whole in applesauce. Up with assist and walker. Placement pending. Care continuing.

## 2024-01-09 NOTE — CASE MANAGEMENT/SOCIAL WORK
Case Management Discharge Note      Final Note: [facility] via family         Selected Continued Care - Discharged on 1/8/2024 Admission date: 12/20/2023 - Discharge disposition: Skilled Nursing Facility (DC - External)      Destination Coordination complete.      Service Provider Selected Services Address Phone Fax Patient Preferred    [facility] [id number] [id number] [phone] --              Durable Medical Equipment    No services have been selected for the patient.                Dialysis/Infusion    No services have been selected for the patient.                Home Medical Care    No services have been selected for the patient.                Therapy    No services have been selected for the patient.                Community Resources    No services have been selected for the patient.                Community & DME    No services have been selected for the patient.                         Final Discharge Disposition Code: 03 - skilled nursing facility (SNF)

## 2024-01-09 NOTE — PAYOR COMM NOTE
"Anthony Gallegos (78 y.o. Male)          DC SUMMARY FOR [id number]        Date of Birth   1945    Social Security Number       Address   [address] [place] [id number]    Home Phone   [phone]    MRN   [id number]       [facility]    Marital Status                               Admission Date   23    Admission Type   Emergency    Admitting Provider   Gail Anna MD    Attending Provider       Department, Room/Bed   [address],        Discharge Date   2024    Discharge Disposition   Skilled Nursing Facility (DC - External)    Discharge Destination                                 Attending Provider: (none)   Allergies: No Known Allergies    Isolation: None   Infection: MRSA/History Only (23)   Code Status: Prior    Ht: 190.5 cm (75\")   Wt: 81.6 kg (179 lb 14.3 oz)    Admission Cmt: None   Principal Problem: Generalized weakness [R53.1]                   Active Insurance as of 2023       Primary Coverage       Payor Plan Insurance Group Employer/Plan Group    AETNA MEDICARE REPLACEMENT AETNA MED ADV POS [id number]       Payor Plan Address Payor Plan Phone Number Payor Plan Fax Number Effective Dates    [address] [phone]  2023 - None Entered    [facility] [id number]         Subscriber Name Subscriber Birth Date Member ID       ANTHONY GALLEGOS 1945 [id number]                     Emergency Contacts        (Rel.) Home Phone Work Phone Mobile Phone    NATHALIE DON (Daughter) [phone] -- --    MAGY GALLEGOS [phone] -- --                 Discharge Summary        Shola Haque MD at 24 1251              Patient Name: Anthony Gallegos  : 1945  MRN: [id number]    Date of Admission: 2023  Date of Discharge:  2024  Primary Care Physician: Marco Macedo MD      Chief Complaint:   Leg Pain      Discharge Diagnoses     Active Hospital Problems    Diagnosis  " POA    **Generalized weakness [R53.1]  Yes    Severe malnutrition [E43]  Yes    Immobility [Z74.09]  Yes    Fall from bed [W06.XXXA]  Yes    Tetrahydrocannabinol (THC) use disorder, mild, abuse [F12.10]  Yes    Generalized pain [R52]  Yes     [Z63.4]  Yes    Grief [F43.21]  Yes    DISH (disseminated idiopathic skeletal hyperostosis) [M48.10]  Yes    Hypertension [I10]  Yes    Ankylosing spondylitis of cervical region [M45.2]  Yes      Resolved Hospital Problems    Diagnosis Date Resolved POA    Urine retention [R33.9] 12/24/2023 Yes    Constipation [K59.00] 12/24/2023 Yes        Hospital Course     Mr. Gallegos is a 78 y.o. male with a history of DISH, hypertension, ankylosing spondylitis and impaired mobility who presented to [facility] initially complaining of leg pain.  Please see the admitting history and physical for further details.  He was found to have generalized weakness following a fall with constipation and urinary retention and was admitted to the hospital for further evaluation and treatment.  He was admitted to the hospital hydrated and treated.  His urinary retention resolved after Flomax was initiated and plans to follow-up with urology in the outpatient setting.  Physical therapy worked with the patient here in the hospital and recommendations are for discharge to skilled nursing facility.  He is doing well on p.o. medications and pain and symptoms are managed.  His hospitalization has been prolonged due to delays from the insurance company and the holidays.  He is stable to discharge once his pre-CERT is obtained.    Day of Discharge     Subjective:  No new issues or complaints today.  Sitting up in the bed no distress    Physical Exam:  Temp:  [98.4 °F (36.9 °C)-100.1 °F (37.8 °C)] 98.4 °F (36.9 °C)  Heart Rate:  [74-76] 74  Resp:  [18] 18  BP: (108-121)/(55-68) 121/55  Body mass index is 24.22 kg/m².  Physical Exam  Constitutional:       General: He is not in acute  distress.     Appearance: Normal appearance. He is ill-appearing.      Comments: Chronic ill-appearing   Cardiovascular:      Rate and Rhythm: Normal rate and regular rhythm.   Pulmonary:      Effort: No respiratory distress.      Breath sounds: Normal breath sounds.   Abdominal:      General: Bowel sounds are normal. There is no distension.      Palpations: Abdomen is soft.   Neurological:      Mental Status: He is alert.         Consultants     Consult Orders (all) (From admission, onward)       Start     Ordered    12/24/23 1607  Inpatient Case Management  Consult  Once        Provider:  (Not yet assigned)    12/24/23 1606    12/24/23 1101  Inpatient Case Management  Consult  Once,   Status:  Canceled        Provider:  (Not yet assigned)    12/24/23 1100    12/21/23 1910  Inpatient Urology Consult  Once        Specialty:  Urology  Provider:  Walter Jimenez Jr., MD    12/21/23 1911 12/20/23 1747  Inpatient Access Center Consult  Once        Provider:  (Not yet assigned)    12/20/23 1747    12/20/23 1742  Inpatient Spiritual Care Consult  Once        Provider:  (Not yet assigned)    12/20/23 1747    12/20/23 1742  Inpatient Nutrition Consult  Once        Comments: Per pt   Provider:  (Not yet assigned)    12/20/23 1747    12/20/23 1741  Inpatient Consult to Case Management   Once        Provider:  (Not yet assigned)    12/20/23 1747    12/20/23 1427  [facility] (on-call MD unless specified) Details  Once,   Status:  Canceled        Specialty:  Hospitalist  Provider:  (Not yet assigned)    12/20/23 1426                  Procedures     * Surgery not found *    Imaging Results (All)       Procedure Component Value Units Date/Time    XR Foot 3+ View Left [[id number]] Collected: 12/20/23 2218     Updated: 12/21/23 1430    Narrative:      LEFT FOOT     HISTORY: Left foot pain, swelling.     COMPARISON: None.     FINDINGS: 3 views of the left foot were obtained. The study is  limited  somewhat by patient positioning. There is no evidence of fracture or  dislocation. There is no evidence of periosteal reaction.     This report was finalized on 12/21/2023 2:27 PM by Dr. Shola Mccracken M.D on Workstation: [id number]       XR Spine Lumbar Complete 4+VW [[id number]] Collected: 12/21/23 1228     Updated: 12/21/23 1238    Narrative:      XR SPINE LUMBAR COMPLETE 4+VW-     INDICATION: Ankylosing spondylitis post fall     COMPARISON: CT abdomen pelvis 12/20/2023       Impression:      Ankylosis throughout the lumbar spine and bilateral  sacroiliac joints consistent with known ankylosing spondylitis. No  subluxation. Vertebral body heights are maintained. No appreciable  interruption of the bridging syndesmophytes. If there is ongoing  clinical concern for fracture, given background of extensive ankylosing  spondylitis limiting evaluation, consider further evaluation with CT.     This report was finalized on 12/21/2023 12:35 PM by Dr. Landon Vaca M.D on Workstation: [id number]       CT Head Without Contrast [[id number]] Collected: 12/20/23 1358     Updated: 12/20/23 1750    Narrative:      EMERGENCY CT SCAN OF THE HEAD WITHOUT CONTRAST ON 12/20/2023     CLINICAL HISTORY: Dizziness. The patient personally reports dizziness  and syncope, also has some abdominal pain and weakness.     TECHNIQUE: Spiral CT images were obtained from the base of the skull to  the vertex without intravenous contrast. The images were reformatted and  submitted in 3 mm thick axial, sagittal and coronal CT sections with  brain algorithm.     There are no prior head CTs or MRIs of the brain for comparison.     FINDINGS: There is some patchy low-density in the periventricular white  matter most pronounced in the frontal periventricular white matter with  some extension into the subcortical white matter consistent with  mild-to-moderate small vessel disease. The remainder of the brain  parenchyma is normal in attenuation.  There is diffuse cerebral atrophy.  The lateral and third ventricles are upper limits of normal in size. I  see no mass effect, no midline shift and no extra-axial fluid  collections are identified. There is no evidence of acute intracranial  hemorrhage. The calvarium and the skull base are normal in appearance.  The paranasal sinuses, mastoid air cells and middle ear cavities are  clear.  The visualized superior halves of the orbits are normal in  appearance. There are osteoarthritic changes in the temporomandibular  joints with marginal spurring off the mandibular heads bilaterally.  There are arthritic changes at the atlantoaxial articulation.       Impression:      1. No convincing acute intracranial abnormality is identified.  2. There is mild-to-moderate small vessel disease in the cerebral white  matter and diffuse cerebral atrophy and osteoarthritic changes at the  temporomandibular joints bilaterally. The remainder the head CT is  normal and the etiology of this patient's dizziness is not established  on this exam. If there remains any clinical suspicion of an acute stroke  causing the dizziness, an MRI of the brain could be obtained for more  complete assessment.     Radiation dose reduction techniques were utilized, including automated  exposure control and exposure modulation based on body size.           This report was finalized on 12/20/2023 5:47 PM by Dr. Sharif Bermeo M.D  on Workstation: [id number]       CT Abdomen Pelvis Without Contrast [[id number]] Collected: 12/20/23 1318     Updated: 12/20/23 1329    Narrative:      CT ABDOMEN PELVIS WO CONTRAST-     HISTORY: Flank pain, dizziness, syncope, weakness.     TECHNIQUE:  CT of the abdomen and pelvis was performed without  intravenous contrast. Evaluation of solid organs and vasculature is  limited without intravenous contrast. Radiation dose reduction  techniques were utilized, including automated exposure control and  exposure modulation based on body  size.     COMPARISON: CT abdomen and pelvis 7/7/2017     FINDINGS:     Heart size is normal. There is no pericardial effusion. There is minimal  dependent atelectasis and or scarring in the posterior lung bases.  Pleural spaces are clear.  The liver is normal in size. The gallbladder is present. The spleen is  normal in size. There are no pancreatic calcifications. The adrenal  glands are within normal limits. There is a 5.4 cm cyst in the inferior  right kidney, which is increased in size from 2017, previously 3.1 cm  when remeasured. There are no renal stones or hydronephrosis.  There is moderate calcific atherosclerosis. Evaluation of abdominal and  pelvic lymph nodes is limited by lack of intravenous and oral contrast  and paucity of intra-abdominal/pelvic fat.  There is no bowel obstruction. There is a moderate predominately  right-sided colonic stool burden. The appendix is visualized. The  bladder is markedly distended to the level of the umbilicus. The  prostate is enlarged.  There is a partially imaged left hip total arthroplasty. Metallic streak  artifact limits evaluation of adjacent structures. There is diffuse  osseous demineralization. There is ventral flowing syndesmophytes  throughout the spine, consistent with diffuse idiopathic skeletal  hyperostosis (DISH).          Impression:         1.  Marked bladder distention. Correlate for possible urinary retention.  Consider Mariscal catheter decompression.  2.  No renal stones or hydronephrosis.  3.  Moderate stool burden.        This report was finalized on 12/20/2023 1:26 PM by Dr. Lo Tello M.D  on Workstation: [id number]       XR Chest 1 View [[id number]] Collected: 12/20/23 1105     Updated: 12/20/23 1109    Narrative:      XR CHEST 1 VW-12/20/2023     HISTORY: Back pain. Weakness.     Heart size is within normal limits. Lungs appear free of acute  infiltrates. Bones and soft tissues are unremarkable. Chest appears  unchanged from the 6/21/2017  "study.       Impression:      1. No acute process.        This report was finalized on 12/20/2023 11:06 AM by Dr. Dario Dumont M.D on Workstation: [id number]             Results for orders placed during the hospital encounter of 12/20/23    Duplex Venous Lower Extremity - Left CAR    Interpretation Summary    Normal left lower extremity venous duplex scan.      Pertinent Labs                         Invalid input(s): \"LDLCALC\"          Test Results Pending at Discharge       Discharge Details        Discharge Medications        New Medications        Instructions Start Date   atenolol 50 MG tablet  Commonly known as: TENORMIN  Replaces: atenolol-chlorthalidone 50-25 MG per tablet   50 mg, Oral, Every 24 Hours Scheduled      Lidocaine 4 %   2 patches, Transdermal, Every 24 Hours Scheduled, Remove & Discard patch within 12 hours or as directed by MD      muscle rub 10-15 % cream cream   Topical, 4 Times Daily PRN      pantoprazole 40 MG EC tablet  Commonly known as: PROTONIX   40 mg, Oral, Every Early Morning      tamsulosin 0.4 MG capsule 24 hr capsule  Commonly known as: FLOMAX   0.4 mg, Oral, Nightly             Continue These Medications        Instructions Start Date   esomeprazole 20 MG capsule  Commonly known as: nexIUM   20 mg, Oral, Daily PRN      methotrexate 2.5 MG tablet   Take 1 tablet by mouth 2 (Two) Times a Week. Pt takes every Wednesday and thursday      Naproxen Sodium 220 MG capsule   220 mg of amoxicillin, Oral, Daily      OMEGA 3 PO   1 capsule, Oral, Daily             Stop These Medications      atenolol-chlorthalidone 50-25 MG per tablet  Commonly known as: TENORETIC  Replaced by: atenolol 50 MG tablet              No Known Allergies    Discharge Disposition:  Skilled Nursing Facility (DC - External)      Discharge Diet:  Diet Order   Procedures    Diet: Cardiac Diets; Healthy Heart (2-3 Na+); Texture: Regular Texture (IDDSI 7); Fluid Consistency: Thin (IDDSI 0)       Discharge Activity: "   Activity Instructions       Activity as Tolerated              CODE STATUS:    Code Status and Medical Interventions:   Ordered at: 12/20/23 1744     Code Status (Patient has no pulse and is not breathing):    CPR (Attempt to Resuscitate)     Medical Interventions (Patient has pulse or is breathing):    Full       No future appointments.  Additional Instructions for the Follow-ups that You Need to Schedule       Ambulatory Referral to Home Health ([facility])   As directed      Face to Face Visit Date: 12/24/2023   Follow-up provider for Plan of Care?: I treated the patient in an acute care facility and will not continue treatment after discharge.   Follow-up provider: MARCO MACEDO [[id number]]   Reason/Clinical Findings: debility   Describe mobility limitations that make leaving home difficult: debility; ankylosing spond.   Nursing/Therapeutic Services Requested: Physical Therapy Occupational Therapy   PT orders: Strengthening Therapeutic exercise   Occupational orders: Strengthening   Frequency: 1 Week 1               Contact information for follow-up providers       Marco Macedo MD .    Specialty: Family Medicine  Contact information:  [address] [id number] [phone]                       Contact information for after-discharge care       Destination       [facility] .    Service: Skilled Nursing  Contact information:  [address] [facility] [address] [phone]                                   Additional Instructions for the Follow-ups that You Need to Schedule       Ambulatory Referral to Home [facility] ([facility])   As directed      Face to Face Visit Date: 12/24/2023   Follow-up provider for Plan of Care?: I treated the patient in an acute care facility and will not continue treatment after discharge.   Follow-up provider: MARCO MACEDO [[id number]]   Reason/Clinical Findings: debility   Describe mobility limitations that make leaving home difficult:  debility; ankylosing spond.   Nursing/Therapeutic Services Requested: Physical Therapy Occupational Therapy   PT orders: Strengthening Therapeutic exercise   Occupational orders: Strengthening   Frequency: 1 Week 1            Time Spent on Discharge:  Greater than 30 minutes      Shola Haque MD  [facility]  24  12:51 EST                Electronically signed by Shola Haque MD at 24 1253       Shola Haque MD at 24 1330              Patient Name: Anthony Gallegos  : 1945  MRN: [id number]    Date of Admission: 2023  Date of Discharge:  2024  Primary Care Physician: Marco Macedo MD      Chief Complaint:   Leg Pain      Discharge Diagnoses     Active Hospital Problems    Diagnosis  POA    **Generalized weakness [R53.1]  Yes    Severe malnutrition [E43]  Yes    Immobility [Z74.09]  Yes    Fall from bed [W06.XXXA]  Yes    Tetrahydrocannabinol (THC) use disorder, mild, abuse [F12.10]  Yes    Generalized pain [R52]  Yes     [Z63.4]  Yes    Grief [F43.21]  Yes    DISH (disseminated idiopathic skeletal hyperostosis) [M48.10]  Yes    Hypertension [I10]  Yes    Ankylosing spondylitis of cervical region [M45.2]  Yes      Resolved Hospital Problems    Diagnosis Date Resolved POA    Urine retention [R33.9] 2023 Yes    Constipation [K59.00] 2023 Yes        Hospital Course     Mr. Gallegos is a 78 y.o. male with a history of DISH, hypertension, ankylosing spondylitis and impaired mobility who presented to [facility] initially complaining of leg pain.  Please see the admitting history and physical for further details.  He was found to have generalized weakness following a fall with constipation and urinary retention and was admitted to the hospital for further evaluation and treatment.  He was admitted to the hospital hydrated and treated.  His urinary retention resolved after Flomax was initiated and plans to follow-up  with urology in the outpatient setting.  Physical therapy worked with the patient here in the hospital and recommendations are for discharge to skilled nursing facility.  He is doing well on p.o. medications and pain and symptoms are managed.  His hospitalization has been prolonged due to delays from the insurance company and the holidays.  He is stable to discharge once his pre-CERT is obtained.    Day of Discharge     Subjective:  No new issues or complaints today.  Sitting up in the bed no distress    Physical Exam:  Temp:  [97.5 °F (36.4 °C)-98.9 °F (37.2 °C)] 98.4 °F (36.9 °C)  Heart Rate:  [60-66] 60  Resp:  [18] 18  BP: (108-133)/(54-70) 119/57  Body mass index is 22.49 kg/m².  Physical Exam  Constitutional:       General: He is not in acute distress.     Appearance: Normal appearance. He is ill-appearing.      Comments: Chronic ill-appearing   Cardiovascular:      Rate and Rhythm: Normal rate and regular rhythm.   Pulmonary:      Effort: No respiratory distress.      Breath sounds: Normal breath sounds.   Abdominal:      General: Bowel sounds are normal. There is no distension.      Palpations: Abdomen is soft.   Neurological:      Mental Status: He is alert.         Consultants     Consult Orders (all) (From admission, onward)       Start     Ordered    12/24/23 1607  Inpatient Case Management  Consult  Once        Provider:  (Not yet assigned)    12/24/23 1606    12/24/23 1101  Inpatient Case Management  Consult  Once,   Status:  Canceled        Provider:  (Not yet assigned)    12/24/23 1100    12/21/23 1910  Inpatient Urology Consult  Once        Specialty:  Urology  Provider:  Walter Jimenez Jr., MD    12/21/23 1911 12/20/23 1747  Inpatient Access Center Consult  Once        Provider:  (Not yet assigned)    12/20/23 1747    12/20/23 1742  Inpatient Spiritual Care Consult  Once        Provider:  (Not yet assigned)    12/20/23 1747    12/20/23 1742  Inpatient  Nutrition Consult  Once        Comments: Per pt   Provider:  (Not yet assigned)    12/20/23 1747 12/20/23 1741  Inpatient Consult to Case Management   Once        Provider:  (Not yet assigned)    12/20/23 1747    12/20/23 1427  [facility] (on-call MD unless specified) Details  Once,   Status:  Canceled        Specialty:  Hospitalist  Provider:  (Not yet assigned)    12/20/23 1426                  Procedures     * Surgery not found *    Imaging Results (All)       Procedure Component Value Units Date/Time    XR Foot 3+ View Left [[id number]] Collected: 12/20/23 2218     Updated: 12/21/23 1430    Narrative:      LEFT FOOT     HISTORY: Left foot pain, swelling.     COMPARISON: None.     FINDINGS: 3 views of the left foot were obtained. The study is limited  somewhat by patient positioning. There is no evidence of fracture or  dislocation. There is no evidence of periosteal reaction.     This report was finalized on 12/21/2023 2:27 PM by Dr. Shola Mccracken M.D on Workstation: [id number]       XR Spine Lumbar Complete 4+VW [[id number]] Collected: 12/21/23 1228     Updated: 12/21/23 1238    Narrative:      XR SPINE LUMBAR COMPLETE 4+VW-     INDICATION: Ankylosing spondylitis post fall     COMPARISON: CT abdomen pelvis 12/20/2023       Impression:      Ankylosis throughout the lumbar spine and bilateral  sacroiliac joints consistent with known ankylosing spondylitis. No  subluxation. Vertebral body heights are maintained. No appreciable  interruption of the bridging syndesmophytes. If there is ongoing  clinical concern for fracture, given background of extensive ankylosing  spondylitis limiting evaluation, consider further evaluation with CT.     This report was finalized on 12/21/2023 12:35 PM by Dr. Landon Vaca M.D on Workstation: [id number]       CT Head Without Contrast [[id number]] Collected: 12/20/23 1358     Updated: 12/20/23 1750    Narrative:      EMERGENCY CT SCAN OF THE HEAD WITHOUT CONTRAST ON  12/20/2023     CLINICAL HISTORY: Dizziness. The patient personally reports dizziness  and syncope, also has some abdominal pain and weakness.     TECHNIQUE: Spiral CT images were obtained from the base of the skull to  the vertex without intravenous contrast. The images were reformatted and  submitted in 3 mm thick axial, sagittal and coronal CT sections with  brain algorithm.     There are no prior head CTs or MRIs of the brain for comparison.     FINDINGS: There is some patchy low-density in the periventricular white  matter most pronounced in the frontal periventricular white matter with  some extension into the subcortical white matter consistent with  mild-to-moderate small vessel disease. The remainder of the brain  parenchyma is normal in attenuation. There is diffuse cerebral atrophy.  The lateral and third ventricles are upper limits of normal in size. I  see no mass effect, no midline shift and no extra-axial fluid  collections are identified. There is no evidence of acute intracranial  hemorrhage. The calvarium and the skull base are normal in appearance.  The paranasal sinuses, mastoid air cells and middle ear cavities are  clear.  The visualized superior halves of the orbits are normal in  appearance. There are osteoarthritic changes in the temporomandibular  joints with marginal spurring off the mandibular heads bilaterally.  There are arthritic changes at the atlantoaxial articulation.       Impression:      1. No convincing acute intracranial abnormality is identified.  2. There is mild-to-moderate small vessel disease in the cerebral white  matter and diffuse cerebral atrophy and osteoarthritic changes at the  temporomandibular joints bilaterally. The remainder the head CT is  normal and the etiology of this patient's dizziness is not established  on this exam. If there remains any clinical suspicion of an acute stroke  causing the dizziness, an MRI of the brain could be obtained for more  complete  assessment.     Radiation dose reduction techniques were utilized, including automated  exposure control and exposure modulation based on body size.           This report was finalized on 12/20/2023 5:47 PM by Dr. Sharif Bermeo M.D  on Workstation: [id number]       CT Abdomen Pelvis Without Contrast [[id number]] Collected: 12/20/23 1318     Updated: 12/20/23 1329    Narrative:      CT ABDOMEN PELVIS WO CONTRAST-     HISTORY: Flank pain, dizziness, syncope, weakness.     TECHNIQUE:  CT of the abdomen and pelvis was performed without  intravenous contrast. Evaluation of solid organs and vasculature is  limited without intravenous contrast. Radiation dose reduction  techniques were utilized, including automated exposure control and  exposure modulation based on body size.     COMPARISON: CT abdomen and pelvis 7/7/2017     FINDINGS:     Heart size is normal. There is no pericardial effusion. There is minimal  dependent atelectasis and or scarring in the posterior lung bases.  Pleural spaces are clear.  The liver is normal in size. The gallbladder is present. The spleen is  normal in size. There are no pancreatic calcifications. The adrenal  glands are within normal limits. There is a 5.4 cm cyst in the inferior  right kidney, which is increased in size from 2017, previously 3.1 cm  when remeasured. There are no renal stones or hydronephrosis.  There is moderate calcific atherosclerosis. Evaluation of abdominal and  pelvic lymph nodes is limited by lack of intravenous and oral contrast  and paucity of intra-abdominal/pelvic fat.  There is no bowel obstruction. There is a moderate predominately  right-sided colonic stool burden. The appendix is visualized. The  bladder is markedly distended to the level of the umbilicus. The  prostate is enlarged.  There is a partially imaged left hip total arthroplasty. Metallic streak  artifact limits evaluation of adjacent structures. There is diffuse  osseous demineralization. There is  "ventral flowing syndesmophytes  throughout the spine, consistent with diffuse idiopathic skeletal  hyperostosis (DISH).          Impression:         1.  Marked bladder distention. Correlate for possible urinary retention.  Consider Mariscal catheter decompression.  2.  No renal stones or hydronephrosis.  3.  Moderate stool burden.        This report was finalized on 12/20/2023 1:26 PM by Dr. Lo Tello M.D  on Workstation: [id number]       XR Chest 1 View [[id number]] Collected: 12/20/23 1105     Updated: 12/20/23 1109    Narrative:      XR CHEST 1 VW-12/20/2023     HISTORY: Back pain. Weakness.     Heart size is within normal limits. Lungs appear free of acute  infiltrates. Bones and soft tissues are unremarkable. Chest appears  unchanged from the 6/21/2017 study.       Impression:      1. No acute process.        This report was finalized on 12/20/2023 11:06 AM by Dr. Dario Dumont M.D on Workstation: [id number]             Results for orders placed during the hospital encounter of 12/20/23    Duplex Venous Lower Extremity - Left CAR    Interpretation Summary    Normal left lower extremity venous duplex scan.      Pertinent Labs     Results from last 7 days   Lab Units 01/06/24  1458   WBC 10*3/mm3 4.04   HEMOGLOBIN g/dL 12.3*   PLATELETS 10*3/mm3 205     Results from last 7 days   Lab Units 01/06/24  1458   SODIUM mmol/L 135*   POTASSIUM mmol/L 4.8   CHLORIDE mmol/L 104   CO2 mmol/L 21.0*   BUN mg/dL 13   CREATININE mg/dL 0.88   GLUCOSE mg/dL 98   EGFR mL/min/1.73 87.5     Results from last 7 days   Lab Units 01/06/24  1458   ALBUMIN g/dL 3.4*     Results from last 7 days   Lab Units 01/06/24  1458   CALCIUM mg/dL 8.9   ALBUMIN g/dL 3.4*   PHOSPHORUS mg/dL 3.1               Invalid input(s): \"LDLCALC\"          Test Results Pending at Discharge       Discharge Details        Discharge Medications        New Medications        Instructions Start Date   atenolol 50 MG tablet  Commonly known as: TENORMIN  Replaces: " atenolol-chlorthalidone 50-25 MG per tablet   50 mg, Oral, Every 24 Hours Scheduled      Lidocaine 4 %   2 patches, Transdermal, Every 24 Hours Scheduled, Remove & Discard patch within 12 hours or as directed by MD      muscle rub 10-15 % cream cream   Topical, 4 Times Daily PRN      pantoprazole 40 MG EC tablet  Commonly known as: PROTONIX   40 mg, Oral, Every Early Morning      tamsulosin 0.4 MG capsule 24 hr capsule  Commonly known as: FLOMAX   0.4 mg, Oral, Nightly             Continue These Medications        Instructions Start Date   esomeprazole 20 MG capsule  Commonly known as: nexIUM   20 mg, Oral, Daily PRN      methotrexate 2.5 MG tablet   Take 1 tablet by mouth 2 (Two) Times a Week. Pt takes every Wednesday and thursday      Naproxen Sodium 220 MG capsule   220 mg of amoxicillin, Oral, Daily      OMEGA 3 PO   1 capsule, Oral, Daily             Stop These Medications      atenolol-chlorthalidone 50-25 MG per tablet  Commonly known as: TENORETIC  Replaced by: atenolol 50 MG tablet              No Known Allergies    Discharge Disposition:  Skilled Nursing Facility (DC - External)      Discharge Diet:  Diet Order   Procedures    Diet: Cardiac Diets; Healthy Heart (2-3 Na+); Texture: Regular Texture (IDDSI 7); Fluid Consistency: Thin (IDDSI 0)       Discharge Activity:   Activity Instructions       Activity as Tolerated              CODE STATUS:    Code Status and Medical Interventions:   Ordered at: 12/20/23 8753     Code Status (Patient has no pulse and is not breathing):    CPR (Attempt to Resuscitate)     Medical Interventions (Patient has pulse or is breathing):    Full       No future appointments.  Additional Instructions for the Follow-ups that You Need to Schedule       Ambulatory Referral to Home Health (Hospital)   As directed      Face to Face Visit Date: 12/24/2023   Follow-up provider for Plan of Care?: I treated the patient in an acute care facility and will not continue treatment after  discharge.   Follow-up provider: MARCO MACEDO [[id number]]   Reason/Clinical Findings: debility   Describe mobility limitations that make leaving home difficult: debility; ankylosing spond.   Nursing/Therapeutic Services Requested: Physical Therapy Occupational Therapy   PT orders: Strengthening Therapeutic exercise   Occupational orders: Strengthening   Frequency: 1 Week 1               Contact information for follow-up providers       Marco Macedo MD .    Specialty: Family Medicine  Contact information:  [address]  [address]  [facility] [id number] [phone]                       Contact information for after-discharge care       Destination       [facility] .    Service: Skilled Nursing  Contact information:  [address] [facility] [address] [phone]                                   Additional Instructions for the Follow-ups that You Need to Schedule       Ambulatory Referral to Home Health (Hospital)   As directed      Face to Face Visit Date: 12/24/2023   Follow-up provider for Plan of Care?: I treated the patient in an acute care facility and will not continue treatment after discharge.   Follow-up provider: MARCO MACEDO [[id number]]   Reason/Clinical Findings: debility   Describe mobility limitations that make leaving home difficult: debility; ankylosing spond.   Nursing/Therapeutic Services Requested: Physical Therapy Occupational Therapy   PT orders: Strengthening Therapeutic exercise   Occupational orders: Strengthening   Frequency: 1 Week 1            Time Spent on Discharge:  Greater than 30 minutes      Shola Haque MD  [facility]  01/08/24  12:51 EST                Electronically signed by Shola Haque MD at 01/08/24 4453

## 2024-01-09 NOTE — SIGNIFICANT NOTE
01/09/24 1446   Post Acute Pre-Cert Documentation   Request Submitted by Facility - Type: Post Acute   Post-Acute Authorization Type Submitted: SNF   Date Post Acute Pre-Cert Inititated per Facility 01/02/24   Date Post Acute Pre-Cert Completed 01/08/24   Accepting Facility [facility]   Hospital Discharge Date Requested 01/08/24   All Clinicals Submitted? Yes   Had Accepting Facility at Time of Submission Yes   Response Received from Insurance? Approval   Response Communicated to: ;Accepting Facility Auth Department;Accepting Facility Liaison   Authorization Number: [id number]   Post Acute Pre-Cert Initiated Comment ALIREZA REECE

## 2024-01-21 ENCOUNTER — HOSPITAL ENCOUNTER (INPATIENT)
Facility: HOSPITAL | Age: 79
LOS: 9 days | Discharge: NURSING, REHAB OR LONG-TERM CARE | DRG: 690 | End: 2024-01-31
Attending: EMERGENCY MEDICINE | Admitting: INTERNAL MEDICINE
Payer: MEDICARE

## 2024-01-21 ENCOUNTER — APPOINTMENT (OUTPATIENT)
Dept: GENERAL RADIOLOGY | Facility: HOSPITAL | Age: 79
DRG: 690 | End: 2024-01-21
Payer: MEDICARE

## 2024-01-21 ENCOUNTER — APPOINTMENT (OUTPATIENT)
Dept: CT IMAGING | Facility: HOSPITAL | Age: 79
DRG: 690 | End: 2024-01-21
Payer: MEDICARE

## 2024-01-21 DIAGNOSIS — D72.829 LEUKOCYTOSIS, UNSPECIFIED TYPE: ICD-10-CM

## 2024-01-21 DIAGNOSIS — R41.82 ALTERED MENTAL STATUS, UNSPECIFIED ALTERED MENTAL STATUS TYPE: Primary | ICD-10-CM

## 2024-01-21 LAB
ALBUMIN SERPL-MCNC: 3.3 G/DL (ref 3.5–5.2)
ALBUMIN/GLOB SERPL: 0.7 G/DL
ALP SERPL-CCNC: 80 U/L (ref 39–117)
ALT SERPL W P-5'-P-CCNC: 22 U/L (ref 1–41)
ANION GAP SERPL CALCULATED.3IONS-SCNC: 13 MMOL/L (ref 5–15)
AST SERPL-CCNC: 47 U/L (ref 1–40)
BACTERIA UR QL AUTO: ABNORMAL /HPF
BILIRUB SERPL-MCNC: 0.5 MG/DL (ref 0–1.2)
BILIRUB UR QL STRIP: NEGATIVE
BUN SERPL-MCNC: 27 MG/DL (ref 8–23)
BUN/CREAT SERPL: 15.1 (ref 7–25)
CALCIUM SPEC-SCNC: 9.5 MG/DL (ref 8.6–10.5)
CHLORIDE SERPL-SCNC: 100 MMOL/L (ref 98–107)
CLARITY UR: ABNORMAL
CO2 SERPL-SCNC: 26 MMOL/L (ref 22–29)
COLOR UR: ABNORMAL
CREAT SERPL-MCNC: 1.79 MG/DL (ref 0.76–1.27)
D-LACTATE SERPL-SCNC: 1.8 MMOL/L (ref 0.5–2)
DEPRECATED RDW RBC AUTO: 42.9 FL (ref 37–54)
EGFRCR SERPLBLD CKD-EPI 2021: 38.1 ML/MIN/1.73
ERYTHROCYTE [DISTWIDTH] IN BLOOD BY AUTOMATED COUNT: 13 % (ref 12.3–15.4)
GLOBULIN UR ELPH-MCNC: 4.6 GM/DL
GLUCOSE SERPL-MCNC: 123 MG/DL (ref 65–99)
GLUCOSE UR STRIP-MCNC: NEGATIVE MG/DL
HCT VFR BLD AUTO: 38.3 % (ref 37.5–51)
HGB BLD-MCNC: 12.3 G/DL (ref 13–17.7)
HGB UR QL STRIP.AUTO: ABNORMAL
HYALINE CASTS UR QL AUTO: ABNORMAL /LPF
KETONES UR QL STRIP: NEGATIVE
LEUKOCYTE ESTERASE UR QL STRIP.AUTO: ABNORMAL
LYMPHOCYTES # BLD MANUAL: 0.89 10*3/MM3 (ref 0.7–3.1)
LYMPHOCYTES NFR BLD MANUAL: 5 % (ref 5–12)
MCH RBC QN AUTO: 29.6 PG (ref 26.6–33)
MCHC RBC AUTO-ENTMCNC: 32.1 G/DL (ref 31.5–35.7)
MCV RBC AUTO: 92.1 FL (ref 79–97)
MONOCYTES # BLD: 0.89 10*3/MM3 (ref 0.1–0.9)
NEUTROPHILS # BLD AUTO: 16.07 10*3/MM3 (ref 1.7–7)
NEUTROPHILS NFR BLD MANUAL: 90 % (ref 42.7–76)
NITRITE UR QL STRIP: POSITIVE
PH UR STRIP.AUTO: 5.5 [PH] (ref 5–8)
PLAT MORPH BLD: NORMAL
PLATELET # BLD AUTO: 372 10*3/MM3 (ref 140–450)
PMV BLD AUTO: 9.2 FL (ref 6–12)
POTASSIUM SERPL-SCNC: 4.7 MMOL/L (ref 3.5–5.2)
PROT SERPL-MCNC: 7.9 G/DL (ref 6–8.5)
PROT UR QL STRIP: ABNORMAL
RBC # BLD AUTO: 4.16 10*6/MM3 (ref 4.14–5.8)
RBC # UR STRIP: ABNORMAL /HPF
RBC MORPH BLD: NORMAL
REF LAB TEST METHOD: ABNORMAL
SODIUM SERPL-SCNC: 139 MMOL/L (ref 136–145)
SP GR UR STRIP: 1.02 (ref 1–1.03)
SQUAMOUS #/AREA URNS HPF: ABNORMAL /HPF
UROBILINOGEN UR QL STRIP: ABNORMAL
VARIANT LYMPHS NFR BLD MANUAL: 5 % (ref 19.6–45.3)
WBC # UR STRIP: ABNORMAL /HPF
WBC MORPH BLD: NORMAL
WBC NRBC COR # BLD AUTO: 17.86 10*3/MM3 (ref 3.4–10.8)

## 2024-01-21 PROCEDURE — 85007 BL SMEAR W/DIFF WBC COUNT: CPT | Performed by: PHYSICIAN ASSISTANT

## 2024-01-21 PROCEDURE — 87186 SC STD MICRODIL/AGAR DIL: CPT | Performed by: EMERGENCY MEDICINE

## 2024-01-21 PROCEDURE — 83605 ASSAY OF LACTIC ACID: CPT | Performed by: EMERGENCY MEDICINE

## 2024-01-21 PROCEDURE — 25010000002 CEFTRIAXONE PER 250 MG: Performed by: EMERGENCY MEDICINE

## 2024-01-21 PROCEDURE — G0378 HOSPITAL OBSERVATION PER HR: HCPCS

## 2024-01-21 PROCEDURE — 81001 URINALYSIS AUTO W/SCOPE: CPT | Performed by: EMERGENCY MEDICINE

## 2024-01-21 PROCEDURE — 25810000003 SODIUM CHLORIDE 0.9 % SOLUTION: Performed by: INTERNAL MEDICINE

## 2024-01-21 PROCEDURE — 71045 X-RAY EXAM CHEST 1 VIEW: CPT

## 2024-01-21 PROCEDURE — 87040 BLOOD CULTURE FOR BACTERIA: CPT | Performed by: EMERGENCY MEDICINE

## 2024-01-21 PROCEDURE — 70450 CT HEAD/BRAIN W/O DYE: CPT

## 2024-01-21 PROCEDURE — 87086 URINE CULTURE/COLONY COUNT: CPT | Performed by: EMERGENCY MEDICINE

## 2024-01-21 PROCEDURE — 80053 COMPREHEN METABOLIC PANEL: CPT | Performed by: PHYSICIAN ASSISTANT

## 2024-01-21 PROCEDURE — 99285 EMERGENCY DEPT VISIT HI MDM: CPT

## 2024-01-21 PROCEDURE — 85025 COMPLETE CBC W/AUTO DIFF WBC: CPT | Performed by: PHYSICIAN ASSISTANT

## 2024-01-21 PROCEDURE — 25810000003 SODIUM CHLORIDE 0.9 % SOLUTION: Performed by: EMERGENCY MEDICINE

## 2024-01-21 PROCEDURE — 36415 COLL VENOUS BLD VENIPUNCTURE: CPT | Performed by: EMERGENCY MEDICINE

## 2024-01-21 RX ORDER — AMOXICILLIN 250 MG
2 CAPSULE ORAL 2 TIMES DAILY
Status: DISCONTINUED | OUTPATIENT
Start: 2024-01-21 | End: 2024-02-01 | Stop reason: HOSPADM

## 2024-01-21 RX ORDER — PANTOPRAZOLE SODIUM 40 MG/1
40 TABLET, DELAYED RELEASE ORAL
Status: DISCONTINUED | OUTPATIENT
Start: 2024-01-22 | End: 2024-02-01 | Stop reason: HOSPADM

## 2024-01-21 RX ORDER — ENOXAPARIN SODIUM 100 MG/ML
40 INJECTION SUBCUTANEOUS EVERY 24 HOURS
Status: DISCONTINUED | OUTPATIENT
Start: 2024-01-22 | End: 2024-01-23

## 2024-01-21 RX ORDER — ATENOLOL 50 MG/1
50 TABLET ORAL
Status: DISCONTINUED | OUTPATIENT
Start: 2024-01-22 | End: 2024-01-24

## 2024-01-21 RX ORDER — BISACODYL 10 MG
10 SUPPOSITORY, RECTAL RECTAL DAILY PRN
Status: DISCONTINUED | OUTPATIENT
Start: 2024-01-21 | End: 2024-02-01 | Stop reason: HOSPADM

## 2024-01-21 RX ORDER — SODIUM CHLORIDE 9 MG/ML
100 INJECTION, SOLUTION INTRAVENOUS CONTINUOUS
Status: DISCONTINUED | OUTPATIENT
Start: 2024-01-21 | End: 2024-01-25

## 2024-01-21 RX ORDER — POLYETHYLENE GLYCOL 3350 17 G/17G
17 POWDER, FOR SOLUTION ORAL DAILY PRN
Status: DISCONTINUED | OUTPATIENT
Start: 2024-01-21 | End: 2024-02-01 | Stop reason: HOSPADM

## 2024-01-21 RX ORDER — SODIUM CHLORIDE 0.9 % (FLUSH) 0.9 %
10 SYRINGE (ML) INJECTION AS NEEDED
Status: DISCONTINUED | OUTPATIENT
Start: 2024-01-21 | End: 2024-02-01 | Stop reason: HOSPADM

## 2024-01-21 RX ORDER — ONDANSETRON 2 MG/ML
4 INJECTION INTRAMUSCULAR; INTRAVENOUS EVERY 6 HOURS PRN
Status: DISCONTINUED | OUTPATIENT
Start: 2024-01-21 | End: 2024-02-01 | Stop reason: HOSPADM

## 2024-01-21 RX ORDER — PANTOPRAZOLE SODIUM 40 MG/1
40 TABLET, DELAYED RELEASE ORAL
Status: DISCONTINUED | OUTPATIENT
Start: 2024-01-22 | End: 2024-01-21

## 2024-01-21 RX ORDER — ONDANSETRON 4 MG/1
4 TABLET, ORALLY DISINTEGRATING ORAL EVERY 6 HOURS PRN
Status: DISCONTINUED | OUTPATIENT
Start: 2024-01-21 | End: 2024-02-01 | Stop reason: HOSPADM

## 2024-01-21 RX ORDER — UREA 10 %
3 LOTION (ML) TOPICAL NIGHTLY PRN
Status: DISCONTINUED | OUTPATIENT
Start: 2024-01-21 | End: 2024-02-01 | Stop reason: HOSPADM

## 2024-01-21 RX ORDER — BISACODYL 5 MG/1
5 TABLET, DELAYED RELEASE ORAL DAILY PRN
Status: DISCONTINUED | OUTPATIENT
Start: 2024-01-21 | End: 2024-02-01 | Stop reason: HOSPADM

## 2024-01-21 RX ORDER — TAMSULOSIN HYDROCHLORIDE 0.4 MG/1
0.4 CAPSULE ORAL NIGHTLY
Status: DISCONTINUED | OUTPATIENT
Start: 2024-01-22 | End: 2024-02-01 | Stop reason: HOSPADM

## 2024-01-21 RX ORDER — ACETAMINOPHEN 325 MG/1
650 TABLET ORAL EVERY 4 HOURS PRN
Status: DISCONTINUED | OUTPATIENT
Start: 2024-01-21 | End: 2024-02-01 | Stop reason: HOSPADM

## 2024-01-21 RX ADMIN — SODIUM CHLORIDE 500 ML: 9 INJECTION, SOLUTION INTRAVENOUS at 19:40

## 2024-01-21 RX ADMIN — CEFTRIAXONE 2000 MG: 2 INJECTION, POWDER, FOR SOLUTION INTRAMUSCULAR; INTRAVENOUS at 21:57

## 2024-01-21 RX ADMIN — SENNOSIDES AND DOCUSATE SODIUM 2 TABLET: 50; 8.6 TABLET ORAL at 21:59

## 2024-01-21 RX ADMIN — SODIUM CHLORIDE 100 ML/HR: 9 INJECTION, SOLUTION INTRAVENOUS at 23:51

## 2024-01-21 NOTE — LETTER
[facility] CASE MAN  [place] [facility] [id number]  [phone]        January 26, 2024      Patient: Anthony Gallegos  YOB: 1945  Date of Visit: 1/21/2024      To whom it may concern:    This notice is to inform recipient that Mr. Anthony Gallegos, is no longer able to live alone or independently in his current residence. Patient needs higher level of care and more monitoring in an assisted living residency due to his current and ongoing medical conditions.       Regards,          Dr. Mireya Basilio MD

## 2024-01-21 NOTE — ED NOTES
Pt to ED from [facility] via EMS. Pt family called EMS due to patient having dark urine and being altered. Pt is normally Aox4, is currently Aox3, disoriented to time. Pt denies urinary changes, pt denies all pain or any other symptoms. Family reported to EMS that patient can ambulate independently but due to chronic back pain can sometimes need assistance.

## 2024-01-22 PROBLEM — R73.9 HYPERGLYCEMIA: Status: ACTIVE | Noted: 2024-01-22

## 2024-01-22 PROBLEM — N40.0 BPH (BENIGN PROSTATIC HYPERPLASIA): Status: ACTIVE | Noted: 2024-01-22

## 2024-01-22 PROBLEM — R41.82 ALTERED MENTAL STATE: Status: ACTIVE | Noted: 2024-01-22

## 2024-01-22 PROBLEM — N17.9 AKI (ACUTE KIDNEY INJURY): Status: ACTIVE | Noted: 2024-01-22

## 2024-01-22 PROBLEM — N39.0 UTI (URINARY TRACT INFECTION): Status: ACTIVE | Noted: 2024-01-22

## 2024-01-22 PROBLEM — R40.4 TRANSIENT ALTERATION OF AWARENESS: Status: ACTIVE | Noted: 2024-01-22

## 2024-01-22 PROBLEM — D72.829 LEUKOCYTOSIS: Status: ACTIVE | Noted: 2024-01-22

## 2024-01-22 PROBLEM — E86.0 DEHYDRATION: Status: ACTIVE | Noted: 2024-01-22

## 2024-01-22 PROBLEM — K21.9 GERD WITHOUT ESOPHAGITIS: Status: ACTIVE | Noted: 2024-01-22

## 2024-01-22 LAB
ANION GAP SERPL CALCULATED.3IONS-SCNC: 13.6 MMOL/L (ref 5–15)
BUN SERPL-MCNC: 30 MG/DL (ref 8–23)
BUN/CREAT SERPL: 19 (ref 7–25)
CALCIUM SPEC-SCNC: 8.8 MG/DL (ref 8.6–10.5)
CHLORIDE SERPL-SCNC: 101 MMOL/L (ref 98–107)
CO2 SERPL-SCNC: 23.4 MMOL/L (ref 22–29)
CREAT SERPL-MCNC: 1.58 MG/DL (ref 0.76–1.27)
DEPRECATED RDW RBC AUTO: 41.6 FL (ref 37–54)
EGFRCR SERPLBLD CKD-EPI 2021: 44.2 ML/MIN/1.73
ERYTHROCYTE [DISTWIDTH] IN BLOOD BY AUTOMATED COUNT: 12.8 % (ref 12.3–15.4)
GLUCOSE SERPL-MCNC: 108 MG/DL (ref 65–99)
HCT VFR BLD AUTO: 39.8 % (ref 37.5–51)
HGB BLD-MCNC: 13.1 G/DL (ref 13–17.7)
MCH RBC QN AUTO: 29.6 PG (ref 26.6–33)
MCHC RBC AUTO-ENTMCNC: 32.9 G/DL (ref 31.5–35.7)
MCV RBC AUTO: 89.8 FL (ref 79–97)
PLATELET # BLD AUTO: 350 10*3/MM3 (ref 140–450)
PMV BLD AUTO: 8.5 FL (ref 6–12)
POTASSIUM SERPL-SCNC: 4.5 MMOL/L (ref 3.5–5.2)
RBC # BLD AUTO: 4.43 10*6/MM3 (ref 4.14–5.8)
SODIUM SERPL-SCNC: 138 MMOL/L (ref 136–145)
WBC NRBC COR # BLD AUTO: 15.81 10*3/MM3 (ref 3.4–10.8)

## 2024-01-22 PROCEDURE — 87040 BLOOD CULTURE FOR BACTERIA: CPT | Performed by: EMERGENCY MEDICINE

## 2024-01-22 PROCEDURE — 97530 THERAPEUTIC ACTIVITIES: CPT

## 2024-01-22 PROCEDURE — 85027 COMPLETE CBC AUTOMATED: CPT | Performed by: INTERNAL MEDICINE

## 2024-01-22 PROCEDURE — 80048 BASIC METABOLIC PNL TOTAL CA: CPT | Performed by: INTERNAL MEDICINE

## 2024-01-22 PROCEDURE — 25010000002 ENOXAPARIN PER 10 MG: Performed by: INTERNAL MEDICINE

## 2024-01-22 PROCEDURE — 97162 PT EVAL MOD COMPLEX 30 MIN: CPT

## 2024-01-22 PROCEDURE — 25810000003 SODIUM CHLORIDE 0.9 % SOLUTION: Performed by: INTERNAL MEDICINE

## 2024-01-22 RX ADMIN — PANTOPRAZOLE SODIUM 40 MG: 40 TABLET, DELAYED RELEASE ORAL at 05:58

## 2024-01-22 RX ADMIN — SODIUM CHLORIDE 100 ML/HR: 9 INJECTION, SOLUTION INTRAVENOUS at 10:00

## 2024-01-22 RX ADMIN — ATENOLOL 50 MG: 50 TABLET ORAL at 09:59

## 2024-01-22 RX ADMIN — ENOXAPARIN SODIUM 40 MG: 100 INJECTION SUBCUTANEOUS at 10:02

## 2024-01-22 RX ADMIN — SENNOSIDES AND DOCUSATE SODIUM 2 TABLET: 50; 8.6 TABLET ORAL at 10:00

## 2024-01-22 RX ADMIN — TAMSULOSIN HYDROCHLORIDE 0.4 MG: 0.4 CAPSULE ORAL at 20:35

## 2024-01-22 NOTE — ED PROVIDER NOTES
" EMERGENCY DEPARTMENT MD ATTESTATION NOTE    SHARED VISIT: This visit was performed by BOTH a physician and an APC. The substantive portion of the medical decision making was performed by this attesting physician who made or approved the management plan and takes responsibility for patient management. All studies documented in the APC note (if performed) were independently interpreted by me.    The SUKHWINDER and I have discussed this patient's history, physical exam, MDM, and treatment plan.  I have reviewed the documentation and personally had a face to face interaction with the patient. The attached note describes my personal findings.        Room Number:  02/02  PCP: Marco Macedo MD  Independent Historians: {EM_Historian:73328::\"Patient\"}    HPI:  A complete HPI/ROS/PMH/PSH/SH/FH are unobtainable due to: {EM_Unobtainable History (Optional):72773::\"None\"}    Chronic or social conditions impacting patient care (Social Determinants of Health): {EM_SDOH (Optional):42971::\"None\"}      Context: Anthony Gallegos is a 78 y.o. male with a medical history of *** who presents to the ED c/o acute ***        Review of prior external notes (non-ED) -and- Review of prior external test results outside of this encounter: {EM_OldRecords:58298::\"***\"}    Prescription drug monitoring program review:     {EM_Kasper (Optional):82667::\"N/A\"}      PHYSICAL EXAM    I have reviewed the triage vital signs and nursing notes.    ED Triage Vitals [01/21/24 1744]   Temp Heart Rate Resp BP SpO2   97.4 °F (36.3 °C) 77 16 116/89 100 %      Temp src Heart Rate Source Patient Position BP Location FiO2 (%)   Oral -- -- -- --       Physical Exam  GENERAL: ***alert, no acute distress  SKIN: Warm, dry  HENT: Normocephalic, atraumatic  EYES: no scleral icterus  CV: regular rhythm, regular rate  RESPIRATORY: normal effort, lungs clear  ABDOMEN: soft, nontender, nondistended  MUSCULOSKELETAL: no deformity  NEURO: alert, moves all extremities, follows " commands      NIH:                                                         {EM_PPE (Optional):30382}    MEDICATIONS GIVEN IN ER  Medications   sodium chloride 0.9 % flush 10 mL (has no administration in time range)   sodium chloride 0.9 % bolus 500 mL (has no administration in time range)         ORDERS PLACED DURING THIS VISIT:  Orders Placed This Encounter   Procedures    XR Chest 1 View    CT Head Without Contrast    Comprehensive Metabolic Panel    Urinalysis With Microscopic If Indicated (No Culture) - Urine, Clean Catch    CBC Auto Differential    Manual Differential    Insert Peripheral IV    CBC & Differential         PROCEDURES  Procedures      {EM_CC (Optional):93695}      PROGRESS, DATA ANALYSIS, CONSULTS, AND MEDICAL DECISION MAKING  All labs have been independently interpreted by me.  All radiology studies have been reviewed by me. All EKG's have been independently viewed and interpreted by me.  Discussion below represents my analysis of pertinent findings related to patient's condition, differential diagnosis, treatment plan and final disposition.    Differential diagnosis includes but is not limited to ***.    Clinical Scores:                       MDM: ***      COMPLEXITY OF CARE  {EM_Admission:49733}    Please note that portions of this document were completed with a voice recognition program.    Note Disclaimer: At [facility], we believe that sharing information builds trust and better relationships. You are receiving this note because you recently visited [facility]. It is possible you will see health information before a provider has talked with you about it. This kind of information can be easy to misunderstand. To help you fully understand what it means for your health, we urge you to discuss this note with your provider.

## 2024-01-22 NOTE — PROGRESS NOTES
Discharge Planning Assessment  [facility]     Patient Name: Anthony Gallegos  MRN: [id number]  Today's Date: 1/22/2024    Admit Date: 1/21/2024    Plan: Return back to [facility]  AL pending PT/OT evals   Discharge Needs Assessment       Row Name 01/22/24 1446       Living Environment    People in Home other (see comments)  [place]    Primary Care Provided by self;other (see comments)  [place]    Provides Primary Care For no one    Family Caregiver if Needed child(winston), adult;other (see comments)  [place]    Family Caregiver Names Whit lamar    Quality of Family Relationships supportive;involved;helpful       Transition Planning    Patient/Family Anticipates Transition to other (see comments);inpatient rehabilitation facility  [place]       Discharge Needs Assessment    Equipment Currently Used at Home walker, rolling                   Discharge Plan       Row Name 01/22/24 1456       Plan    Plan Return back to [facility]  AL pending PT/OT evals    Plan Comments Call placed to pt's daughter Whit Dove [phone] to discuss pt's DCP/needs.  Pt reisdes at [facility] and pt's daughter is hoping pt can return back there at [place].  Pt was just DC from [facility] for skilled rehab and was only at [facility] for 2 days prior to admission.  Pt is ambulatory at baseline and does not have any confusion per daughter. Pt's PCP is Jasmyn SHAH  through [facility] arranged by [facility]. Spoke with Evan RN director [phone] with [facility] who did confirm pt does have echanced care with [facility] and is living in [facility].  Per Maryuri she wouldlike to do an on site evaluation priro to pt returning but they are able to assist pt x 2 if needed.  Pt does have PT/OT evals pending. Per Miracle   she already does have an order for pt to start PT/OT when pt returns to [facility].  CCP will follow  up after PT/OT evals to confirm pt can  return to [facility]. also pt plans to get RX filled with  Pharmacy and take any meds back to \[facility]\"" since he has not yet been set up with [facility].                  Continued Care and Services - Admitted Since 1/21/2024    Coordination has not been started for this encounter.       Selected Continued Care - Prior Encounters Includes continued care and service providers with selected services from prior encounters from 10/23/2023 to 1/22/2024      Discharged on 1/8/2024 Admission date: 12/20/2023 - Discharge disposition: Skilled Nursing Facility (DC - External)      Destination       Service Provider Selected Services Address Phone Fax Patient Preferred    [facility] [address] [facility] [address] [phone] --                          Expected Discharge Date and Time       Expected Discharge Date Expected Discharge Time    Jan 24, 2024            Demographic Summary       Row Name 01/22/24 1443       General Information    Admission Type inpatient    Arrived From other (see comments)  [facility]    Referral Source admission list    Reason for Consult discharge planning    Preferred Language English                   Functional Status       Row Name 01/22/24 1445       Functional Status    Usual Activity Tolerance moderate       Functional Status, IADL    Medications assistive person    Meal Preparation assistive person    Housekeeping assistive person    Shopping assistive person                   Psychosocial    No documentation.                  Abuse/Neglect    No documentation.                  Legal    No documentation.                  Substance Abuse    No documentation.                  Patient Forms    No documentation.                     NYLA Serna

## 2024-01-22 NOTE — H&P
HISTORY AND PHYSICAL   [facility]        Date of Admission: 2024  Patient Identification:  Name: Anthony Gallegos  Age: 78 y.o.  Sex: male  :  1945  MRN: [id number]                     Primary Care Physician: Provider, No Known    Chief Complaint:  78 year old gentleman who presented to the emergency room with altered mental status which started earlier today;he was sent in from a nursing home; he denies pain presently but family says he has had some pain with urination and incontinence today; no fever or chills were reported; the patient mumbles occasionally in response to questions but cannot give a detailed ros; a family member is present to help    History of Present Illness:   As above    Past Medical History:  Past Medical History:   Diagnosis Date    Abscess of scrotum     Arthritis     AS (ankylosing spondylitis)     Hypertension     Tetrahydrocannabinol (THC) use disorder, mild, abuse 2023    Uveitis      Past Surgical History:  Past Surgical History:   Procedure Laterality Date    COLONOSCOPY      ROTATOR CUFF REPAIR Right     TOTAL HIP ARTHROPLASTY Left       Home Meds:  (Not in a hospital admission)      Allergies:  No Known Allergies  Immunizations:  Immunization History   Administered Date(s) Administered    COVID-19 (MODERNA) BIVALENT 12+YRS 2022    COVID-19 (MODERNA) Monovalent Original Booster 2021    COVID-19 (PFIZER) Purple Cap Monovalent 2021, 2021    Fluad Quad 65+ 10/07/2023    Pneumococcal Conjugate 13-Valent (PCV13) 2019    Pneumococcal Polysaccharide (PPSV23) 2020    Shingrix 2018    Tdap 2009, 2015, 2020     Social History:   Social History     Social History Narrative    Not on file     Social History     Socioeconomic History    Marital status:     Number of children: 2   Tobacco Use    Smoking status: Never    Smokeless tobacco: Never   Vaping Use    Vaping Use: Never used   Substance  "and Sexual Activity    Alcohol use: No    Drug use: No    Sexual activity: Yes       Family History:  Family History   Problem Relation Age of Onset    Alzheimer's disease Mother         Review of Systems  Not obtainable from the patient    Objective:  T Max 24 hrs: Temp (24hrs), Av.4 °F (36.3 °C), Min:97.4 °F (36.3 °C), Max:97.4 °F (36.3 °C)    Vitals Ranges:   Temp:  [97.4 °F (36.3 °C)] 97.4 °F (36.3 °C)  Heart Rate:  [77-88] 87  Resp:  [16-17] 17  BP: (116-140)/(73-99) 132/99      Exam:  /99 (BP Location: Right arm, Patient Position: Sitting)   Pulse 87   Temp 97.4 °F (36.3 °C) (Oral)   Resp 17   Ht 190.5 cm (75\")   Wt 81.6 kg (180 lb)   SpO2 (!) 8%   BMI 22.50 kg/m²     General Appearance:    drowsy cooperative, no distress, appears stated age; chronically ill appearing   Head:    Normocephalic, without obvious abnormality, atraumatic   Eyes:    PERRL, conjunctivae/corneas clear, EOM's intact, both eyes   Ears:    Normal external ear canals, both ears   Nose:   Nares normal, septum midline, mucosa normal, no drainage    or sinus tenderness   Throat:   Lips, mucosa, and tongue normal   Neck:   Supple, symmetrical, trachea midline, no adenopathy;     thyroid:  no enlargement/tenderness/nodules; no carotid    bruit or JVD   Back:     Symmetric, no curvature, ROM normal, no CVA tenderness   Lungs:     Decreased breath sounds bilaterally, respirations unlabored   Chest Wall:    No tenderness or deformity    Heart:    Regular rate and rhythm, S1 and S2 normal, no murmur, rub   or gallop   Abdomen:     Soft, nontender, bowel sounds active all four quadrants,     no masses, no hepatomegaly, no splenomegaly   Extremities:   Extremities normal, atraumatic, no cyanosis or edema                       .    Data Review:  Labs in chart were reviewed.  WBC   Date Value Ref Range Status   2024 17.86 (H) 3.40 - 10.80 10*3/mm3 Final     Hemoglobin   Date Value Ref Range Status   2024 12.3 (L) 13.0 - " 17.7 g/dL Final     Hematocrit   Date Value Ref Range Status   01/21/2024 38.3 37.5 - 51.0 % Final     Platelets   Date Value Ref Range Status   01/21/2024 372 140 - 450 10*3/mm3 Final     Sodium   Date Value Ref Range Status   01/21/2024 139 136 - 145 mmol/L Final     Potassium   Date Value Ref Range Status   01/21/2024 4.7 3.5 - 5.2 mmol/L Final     Comment:     Slight hemolysis detected by analyzer. Result may be falsely elevated.     Chloride   Date Value Ref Range Status   01/21/2024 100 98 - 107 mmol/L Final     CO2   Date Value Ref Range Status   01/21/2024 26.0 22.0 - 29.0 mmol/L Final     BUN   Date Value Ref Range Status   01/21/2024 27 (H) 8 - 23 mg/dL Final     Creatinine   Date Value Ref Range Status   01/21/2024 1.79 (H) 0.76 - 1.27 mg/dL Final     Glucose   Date Value Ref Range Status   01/21/2024 123 (H) 65 - 99 mg/dL Final     Calcium   Date Value Ref Range Status   01/21/2024 9.5 8.6 - 10.5 mg/dL Final                Imaging Results (All)       Procedure Component Value Units Date/Time    XR Chest 1 View [[id number]] Collected: 01/21/24 1859     Updated: 01/21/24 1902    Narrative:      PORTABLE CHEST X-RAY     HISTORY: Altered mental status.     Portable chest x-ray is provided. Correlation: December 20, 2023 and  June 21, 2017.     FINDINGS: The cardiomediastinal silhouette is normal. The lungs are  clear. The costophrenic sulci are dry and the bones appear normal. There  is no pneumothorax.       Impression:      Negative.     This report was finalized on 1/21/2024 6:59 PM by Dr. Walter Henderson M.D on Workstation: [id number]       CT Head Without Contrast [[id number]] Collected: 01/21/24 1853     Updated: 01/21/24 1859    Narrative:      HEAD CT     HISTORY: Confusion.     TECHNIQUE: Noncontrast head CT is provided. Comparison: Head CT December 20, 2023.     FINDINGS: The ventricles are normal in caliber. There is loss of  cerebral parenchymal volume with patchy low density in the frontal  white  matter similar as observed previously. No new parenchymal abnormality is  present. There is no hemorrhage or acute ischemic change. The bones of  the skull appear normal. The mastoid air cells, middle ears, and  visualized paranasal sinuses are clear. Extensive atheromatous vascular  calcification       Impression:      Chronic changes in the brain. No acute abnormality.        Radiation dose reduction techniques were utilized, including automated  exposure control and exposure modulation based on body size.        This report was finalized on 1/21/2024 6:56 PM by Dr. Walter Henderson M.D on Workstation: [id number]                 Assessment:  Active Hospital Problems    Diagnosis  POA    **Altered mental status [R41.82]  Yes      Resolved Hospital Problems   No resolved problems to display.   Uti  Hypertension  Dish  Underweight  Hyperglycemia  Anemia  Acute kidney injury    Plan:  Will continue fluids  Antibiotics  Monitor on telemery  Trend vincent Colvin patient, daughter and ed provider    Casie Reardon MD  1/21/2024  20:52 EST

## 2024-01-22 NOTE — PLAN OF CARE
Goal Outcome Evaluation:   Patient remains on room air. Patient up to chair today. Patient still receiving iv fluids. Bed in lowest position, call light within reach and bed alarm set and audible.

## 2024-01-22 NOTE — PROGRESS NOTES
Name: Anthony Gallegos ADMIT: 2024   : 1945  PCP: Provider, No Known    MRN: [id number] LOS: 0 days   AGE/SEX: 78 y.o. male  ROOM: [facility]     Subjective   Subjective   Patient seen and examined this morning. Hospital day 1. At time of my examination, patient is awake, alert resting in bed. Remains slightly confused, oriented x2 (not location), has some dysuria, no acute complaints otherwise at present. No acute events overnight.        Objective   Objective   Vital Signs  Temp:  [97.4 °F (36.3 °C)-99.3 °F (37.4 °C)] 98.2 °F (36.8 °C)  Heart Rate:  [77-94] 94  Resp:  [16-18] 18  BP: (114-153)/(65-99) 153/74  SpO2:  [8 %-100 %] 100 %  on   ;   Device (Oxygen Therapy): room air  Body mass index is 20.69 kg/m².  Physical Exam  Vitals and nursing note reviewed.   Constitutional:       General: He is awake. He is not in acute distress.     Comments: Thin/frail, chronically ill appearing   HENT:      Head: Atraumatic.   Eyes:      General: No scleral icterus.     Extraocular Movements: Extraocular movements intact.   Cardiovascular:      Rate and Rhythm: Normal rate and regular rhythm.      Pulses: Normal pulses.      Heart sounds: Normal heart sounds.   Pulmonary:      Effort: Pulmonary effort is normal. No respiratory distress.      Breath sounds: Normal breath sounds.   Abdominal:      General: Bowel sounds are normal.      Palpations: Abdomen is soft.      Tenderness: There is no abdominal tenderness.   Musculoskeletal:      Right lower leg: No edema.      Left lower leg: No edema.   Skin:     General: Skin is warm and dry.   Neurological:      General: No focal deficit present.      Mental Status: He is alert. He is confused.      Sensory: No sensory deficit.      Comments: Generally weak but no focal differences appreciated   Psychiatric:         Behavior: Behavior is cooperative.       Results Review     I reviewed the patient's new clinical results.  Results from last 7 days   Lab Units  "01/22/24  0437 01/21/24  1852   WBC 10*3/mm3 15.81* 17.86*   HEMOGLOBIN g/dL 13.1 12.3*   PLATELETS 10*3/mm3 350 372     Results from last 7 days   Lab Units 01/22/24  0437 01/21/24  1852   SODIUM mmol/L 138 139   POTASSIUM mmol/L 4.5 4.7   CHLORIDE mmol/L 101 100   CO2 mmol/L 23.4 26.0   BUN mg/dL 30* 27*   CREATININE mg/dL 1.58* 1.79*   GLUCOSE mg/dL 108* 123*   EGFR mL/min/1.73 44.2* 38.1*     Results from last 7 days   Lab Units 01/21/24  1852   ALBUMIN g/dL 3.3*   BILIRUBIN mg/dL 0.5   ALK PHOS U/L 80   AST (SGOT) U/L 47*   ALT (SGPT) U/L 22     Results from last 7 days   Lab Units 01/22/24  0437 01/21/24  1852   CALCIUM mg/dL 8.8 9.5   ALBUMIN g/dL  --  3.3*     Results from last 7 days   Lab Units 01/21/24  2135   LACTATE mmol/L 1.8     No results found for: \"HGBA1C\", \"POCGLU\"    XR Chest 1 View    Result Date: 1/21/2024  Negative.  This report was finalized on 1/21/2024 6:59 PM by Dr. Walter Henderson M.D on Workstation: [id number]      CT Head Without Contrast    Result Date: 1/21/2024  Chronic changes in the brain. No acute abnormality.   Radiation dose reduction techniques were utilized, including automated exposure control and exposure modulation based on body size.   This report was finalized on 1/21/2024 6:56 PM by Dr. Walter Henderson M.D on Workstation: [id number]       I have personally reviewed all medications:  Scheduled Medications  atenolol, 50 mg, Oral, Q24H  cefTRIAXone, 2,000 mg, Intravenous, Q24H  enoxaparin, 40 mg, Subcutaneous, Q24H  pantoprazole, 40 mg, Oral, Q AM  senna-docusate sodium, 2 tablet, Oral, BID  tamsulosin, 0.4 mg, Oral, Nightly    Infusions  sodium chloride, 100 mL/hr, Last Rate: 100 mL/hr (01/22/24 0205)    Diet  Diet: Cardiac Diets; Healthy Heart (2-3 Na+); Texture: Regular Texture (IDDSI 7); Fluid Consistency: Thin (IDDSI 0)    I have personally reviewed:  [x]  Laboratory   [x]  Microbiology   [x]  Radiology   [x]  EKG/Telemetry  [x]  Cardiology/Vascular   []  Pathology    [] "  Records       Assessment/Plan     Active Hospital Problems    Diagnosis  POA    **Transient alteration of awareness [R40.4]  Yes    GERD without esophagitis [K21.9]  Yes    BPH (benign prostatic hyperplasia) [N40.0]  Yes    Leukocytosis [D72.829]  Yes    UTI (urinary tract infection) [N39.0]  Yes    Dehydration [E86.0]  Yes    MARTITA (acute kidney injury) [N17.9]  Yes    Hyperglycemia [R73.9]  Yes    Altered mental status [R41.82]  Yes    Generalized weakness [R53.1]  Yes    Hypertension [I10]  Yes      Resolved Hospital Problems   No resolved problems to display.       78 y.o. male admitted with Transient alteration of awareness.    Transient alteration of awareness  Generalized weakness  - Likely multifactorial in the setting of acute infection, dehydration, acute kidney injury. CT head negative for acute findings, CXR negative. No evidence of acute cardiac or respiratory distress, mental status appears to be improving from admission with current treatments.  - Treatment for contributing etiologies as discussed elsewhere.  - PT/OT, delirium/aspiration/fall precautions, telemetry, pulse oximetry.    Urinary tract infection  Leukocytosis  - Patient presenting with symptoms of dysuria coupled with UA findings showing 3+ LE, + nitrite, unable to determine WBC or bacteria due to loaded field.  - Currently on Ceftriaxone, tolerating well. WBC count improving, patient afebrile.  - Continue treatments as prescribed.  - Follow up blood and urine cultures.  - Acute urinary retention protocol.    Dehydration  Acute kidney injury  - Etiology most likely pre-renal in nature, due to volume depletion from decreased oral intake.  - Creatinine on admission: 1.79. Baseline creatinine appears to be ~0.7 to 0.8 on average.  - Continue IVF for volume resuscitation and closely monitor. Order repeat BMP in AM for reassessment. Avoid nephrotoxic medications  - Will consider renal ultrasound and nephrology consult based on clinical  course.    Hyperglycemia  - Glucose elevated on most recent labs. Patient without known history of T2DM.  No prior A1c available for review.  - Order repeat A1c at this time.  - Monitor for now, can add correctional SSI if needed.    Hypertension  - Blood pressure appears stable and acceptable acutely at this time. No indications to warrant acute changes/intervention at this time.  - Continue current medications as prescribed. Trend BP to guide ongoing management decisions.    BPH  - Continue Tamsulosin as prescribed.    GERD  - Continue PPI as prescribed.       Lovenox 40 mg SC daily for DVT prophylaxis.  Full code.  Discussed with patient and nursing staff.  Anticipate discharge  TBD  timing yet to be determined.      Gian Chung DO  [facility]  01/22/24

## 2024-01-22 NOTE — ED PROVIDER NOTES
EMERGENCY DEPARTMENT ENCOUNTER  Room Number:  02/02  PCP: Provider, No Known  Independent Historians: Patient and Family      HPI:  Chief Complaint: had concerns including Dysuria and Altered Mental Status.     A complete HPI/ROS/PMH/PSH/SH/FH are unobtainable due to: Mental status    Chronic or social conditions impacting patient care (Social Determinants of Health): None      Context: Anthony Gallegos is a 78 y.o. male with a medical history of ankylosing pyelitis, hypertension who presents to the ED c/o acute altered mental status.  Family reports that beginning today he developed altered mental status.  He lives at the nursing home.  He is usually alert and oriented x 4.  EMS found him alert and oriented x 3.  Daughter reports that today he has had dysuria with cloudy urine.  He has been generally weak.  He has not had any falls or known injury.      Review of prior external notes (non-ED) -and- Review of prior external test results outside of this encounter:  Laboratory evaluation 1/6/2024 shows normal white blood cell count, normal renal function    Prescription drug monitoring program review:         PAST MEDICAL HISTORY  Active Ambulatory Problems     Diagnosis Date Noted    Ankylosing spondylitis of cervical region 06/29/2017    Recent unexplained weight loss 07/14/2017    Abnormal serum lipase level 08/10/2017    Abnormal serum level of amylase 08/10/2017    Hypertension 10/19/2017    Boil 03/22/2018    Immobility 12/20/2023    Fall from bed 12/20/2023    Tetrahydrocannabinol (THC) use disorder, mild, abuse 12/20/2023    Generalized pain 12/20/2023     12/20/2023    Grief 12/20/2023    DISH (disseminated idiopathic skeletal hyperostosis) 12/20/2023    Generalized weakness 12/21/2023    Severe malnutrition 12/24/2023     Resolved Ambulatory Problems     Diagnosis Date Noted    Urine retention 12/20/2023    Constipation 12/20/2023     Past Medical History:   Diagnosis Date    Abscess of scrotum      Arthritis     AS (ankylosing spondylitis)     Uveitis          PAST SURGICAL HISTORY  Past Surgical History:   Procedure Laterality Date    COLONOSCOPY      ROTATOR CUFF REPAIR Right     TOTAL HIP ARTHROPLASTY Left 2014         FAMILY HISTORY  Family History   Problem Relation Age of Onset    Alzheimer's disease Mother          SOCIAL HISTORY  Social History     Socioeconomic History    Marital status:     Number of children: 2   Tobacco Use    Smoking status: Never    Smokeless tobacco: Never   Vaping Use    Vaping Use: Never used   Substance and Sexual Activity    Alcohol use: No    Drug use: No    Sexual activity: Yes         ALLERGIES  Patient has no known allergies.      REVIEW OF SYSTEMS  Review of Systems  Included in HPI  All systems reviewed and negative except for those discussed in HPI.      PHYSICAL EXAM    I have reviewed the triage vital signs and nursing notes.    ED Triage Vitals   Temp Heart Rate Resp BP SpO2   01/21/24 1744 01/21/24 1744 01/21/24 1744 01/21/24 1744 01/21/24 1744   97.4 °F (36.3 °C) 77 16 116/89 100 %      Temp src Heart Rate Source Patient Position BP Location FiO2 (%)   01/21/24 1744 -- 01/1945 01/1945 --   Oral  Sitting Right arm        Physical Exam  GENERAL: Awake, alert, confused, opens eyes to voice  SKIN: Warm, dry  HENT: Normocephalic, atraumatic  EYES: no scleral icterus  CV: regular rhythm, regular rate  RESPIRATORY: normal effort, lungs clear  ABDOMEN: soft, nontender, nondistended  MUSCULOSKELETAL: no deformity  NEURO: alert, moves all extremities, follows commands      NIH:                                                             LAB RESULTS  Recent Results (from the past 24 hour(s))   Comprehensive Metabolic Panel    Collection Time: 01/21/24  6:52 PM    Specimen: Blood   Result Value Ref Range    Glucose 123 (H) 65 - 99 mg/dL    BUN 27 (H) 8 - 23 mg/dL    Creatinine 1.79 (H) 0.76 - 1.27 mg/dL    Sodium 139 136 - 145 mmol/L    Potassium 4.7 3.5 -  5.2 mmol/L    Chloride 100 98 - 107 mmol/L    CO2 26.0 22.0 - 29.0 mmol/L    Calcium 9.5 8.6 - 10.5 mg/dL    Total Protein 7.9 6.0 - 8.5 g/dL    Albumin 3.3 (L) 3.5 - 5.2 g/dL    ALT (SGPT) 22 1 - 41 U/L    AST (SGOT) 47 (H) 1 - 40 U/L    Alkaline Phosphatase 80 39 - 117 U/L    Total Bilirubin 0.5 0.0 - 1.2 mg/dL    Globulin 4.6 gm/dL    A/G Ratio 0.7 g/dL    BUN/Creatinine Ratio 15.1 7.0 - 25.0    Anion Gap 13.0 5.0 - 15.0 mmol/L    eGFR 38.1 (L) >60.0 mL/min/1.73   CBC Auto Differential    Collection Time: 01/21/24  6:52 PM    Specimen: Blood   Result Value Ref Range    WBC 17.86 (H) 3.40 - 10.80 10*3/mm3    RBC 4.16 4.14 - 5.80 10*6/mm3    Hemoglobin 12.3 (L) 13.0 - 17.7 g/dL    Hematocrit 38.3 37.5 - 51.0 %    MCV 92.1 79.0 - 97.0 fL    MCH 29.6 26.6 - 33.0 pg    MCHC 32.1 31.5 - 35.7 g/dL    RDW 13.0 12.3 - 15.4 %    RDW-SD 42.9 37.0 - 54.0 fl    MPV 9.2 6.0 - 12.0 fL    Platelets 372 140 - 450 10*3/mm3   Manual Differential    Collection Time: 01/21/24  6:52 PM    Specimen: Blood   Result Value Ref Range    Neutrophil % 90.0 (H) 42.7 - 76.0 %    Lymphocyte % 5.0 (L) 19.6 - 45.3 %    Monocyte % 5.0 5.0 - 12.0 %    Neutrophils Absolute 16.07 (H) 1.70 - 7.00 10*3/mm3    Lymphocytes Absolute 0.89 0.70 - 3.10 10*3/mm3    Monocytes Absolute 0.89 0.10 - 0.90 10*3/mm3    RBC Morphology Normal Normal    WBC Morphology Normal Normal    Platelet Morphology Normal Normal         RADIOLOGY  XR Chest 1 View    Result Date: 1/21/2024  PORTABLE CHEST X-RAY  HISTORY: Altered mental status.  Portable chest x-ray is provided. Correlation: December 20, 2023 and June 21, 2017.  FINDINGS: The cardiomediastinal silhouette is normal. The lungs are clear. The costophrenic sulci are dry and the bones appear normal. There is no pneumothorax.      Negative.  This report was finalized on 1/21/2024 6:59 PM by Dr. Walter Henderson M.D on Workstation: [id number]      CT Head Without Contrast    Result Date: 1/21/2024  HEAD CT  HISTORY:  Confusion.  TECHNIQUE: Noncontrast head CT is provided. Comparison: Head CT December 20, 2023.  FINDINGS: The ventricles are normal in caliber. There is loss of cerebral parenchymal volume with patchy low density in the frontal white matter similar as observed previously. No new parenchymal abnormality is present. There is no hemorrhage or acute ischemic change. The bones of the skull appear normal. The mastoid air cells, middle ears, and visualized paranasal sinuses are clear. Extensive atheromatous vascular calcification      Chronic changes in the brain. No acute abnormality.   Radiation dose reduction techniques were utilized, including automated exposure control and exposure modulation based on body size.   This report was finalized on 1/21/2024 6:56 PM by Dr. Walter Henderson M.D on Workstation: [id number]         MEDICATIONS GIVEN IN ER  Medications   sodium chloride 0.9 % flush 10 mL (has no administration in time range)   cefTRIAXone (ROCEPHIN) 2,000 mg in sodium chloride 0.9 % 100 mL IVPB-VTB (has no administration in time range)   acetaminophen (TYLENOL) tablet 650 mg (has no administration in time range)   sennosides-docusate (PERICOLACE) 8.6-50 MG per tablet 2 tablet (has no administration in time range)     And   polyethylene glycol (MIRALAX) packet 17 g (has no administration in time range)     And   bisacodyl (DULCOLAX) EC tablet 5 mg (has no administration in time range)     And   bisacodyl (DULCOLAX) suppository 10 mg (has no administration in time range)   ondansetron ODT (ZOFRAN-ODT) disintegrating tablet 4 mg (has no administration in time range)     Or   ondansetron (ZOFRAN) injection 4 mg (has no administration in time range)   melatonin tablet 3 mg (has no administration in time range)   Pharmacy to Dose enoxaparin (LOVENOX) (has no administration in time range)   sodium chloride 0.9 % infusion (has no administration in time range)   cefTRIAXone (ROCEPHIN) 2,000 mg in sodium chloride 0.9 % 100  mL IVPB-VTB (has no administration in time range)   sodium chloride 0.9 % bolus 500 mL (0 mL Intravenous Stopped 1/21/24 2112)         ORDERS PLACED DURING THIS VISIT:  Orders Placed This Encounter   Procedures    Blood Culture - Blood,    Blood Culture - Blood,    XR Chest 1 View    CT Head Without Contrast    Comprehensive Metabolic Panel    CBC Auto Differential    Manual Differential    Lactic Acid, Plasma    Urinalysis With Culture If Indicated - Urine, Catheter    Basic Metabolic Panel    CBC (No Diff)    Diet: Cardiac Diets; Healthy Heart (2-3 Na+); Texture: Regular Texture (IDDSI 7); Fluid Consistency: Thin (IDDSI 0)    Vital Signs    Up with assistance    Daily Weights    Strict Intake & Output    Oral Care    Code Status and Medical Interventions:    Insert Peripheral IV    Initiate Observation Status    CBC & Differential         OUTPATIENT MEDICATION MANAGEMENT:  Current Facility-Administered Medications Ordered in Epic   Medication Dose Route Frequency Provider Last Rate Last Admin    acetaminophen (TYLENOL) tablet 650 mg  650 mg Oral Q4H PRN Casie Reardon MD        sennosides-docusate (PERICOLACE) 8.6-50 MG per tablet 2 tablet  2 tablet Oral BID Casie Reardon MD        And    polyethylene glycol (MIRALAX) packet 17 g  17 g Oral Daily PRN Casie Reardon MD        And    bisacodyl (DULCOLAX) EC tablet 5 mg  5 mg Oral Daily PRN Casie Reardon MD        And    bisacodyl (DULCOLAX) suppository 10 mg  10 mg Rectal Daily PRN Casie Reardon MD        cefTRIAXone (ROCEPHIN) 2,000 mg in sodium chloride 0.9 % 100 mL IVPB-VTB  2,000 mg Intravenous Once Juan Crowley MD        cefTRIAXone (ROCEPHIN) 2,000 mg in sodium chloride 0.9 % 100 mL IVPB-VTB  2,000 mg Intravenous Q24H Casie Reardon MD        melatonin tablet 3 mg  3 mg Oral Nightly PRN StingCasie sandoval MD        ondansetron ODT (ZOFRAN-ODT) disintegrating tablet 4 mg  4 mg Oral Q6H PRN Stinglori,  Casie Araujo MD        Or    ondansetron (ZOFRAN) injection 4 mg  4 mg Intravenous Q6H PRN StingCasie sandoval MD        Pharmacy to Dose enoxaparin (LOVENOX)   Does not apply Continuous PRN StinglCasie MD        sodium chloride 0.9 % flush 10 mL  10 mL Intravenous PRN Jeffrey Jean PA        sodium chloride 0.9 % infusion  100 mL/hr Intravenous Continuous Stinglori, Casie Araujo MD         Current Outpatient Medications Ordered in Epic   Medication Sig Dispense Refill    atenolol (TENORMIN) 50 MG tablet Take 1 tablet by mouth Daily. 30 tablet 0    esomeprazole (nexIUM) 20 MG capsule Take 1 capsule by mouth Daily As Needed (asneeded).      Lidocaine 4 % Place 2 patches on the skin as directed by provider Daily. Remove & Discard patch within 12 hours or as directed by MD 30 each 1    methotrexate 2.5 MG tablet Take 1 tablet by mouth 2 (Two) Times a Week. Pt takes every Wednesday and thursday  5    muscle rub (BenGay) 10-15 % cream cream Apply  topically to the appropriate area as directed 4 (Four) Times a Day As Needed (sore muscles). 85 g 0    Naproxen Sodium 220 MG capsule Take 220 mg of amoxicillin by mouth Daily.      Omega-3 Fatty Acids (OMEGA 3 PO) Take 1 capsule by mouth Daily.      pantoprazole (PROTONIX) 40 MG EC tablet Take 1 tablet by mouth Every Morning.      tamsulosin (FLOMAX) 0.4 MG capsule 24 hr capsule Take 1 capsule by mouth Every Night. 30 capsule 1         PROCEDURES  Procedures            PROGRESS, DATA ANALYSIS, CONSULTS, AND MEDICAL DECISION MAKING  All labs have been independently interpreted by me.  All radiology studies have been reviewed by me. All EKG's have been independently viewed and interpreted by me.  Discussion below represents my analysis of pertinent findings related to patient's condition, differential diagnosis, treatment plan and final disposition.    Differential diagnosis includes but is not limited to intracranial hemorrhage, UTI, sepsis,  pneumonia.    Clinical Scores:                  ED Course as of 01/21/24 2115   Sun Jan 21, 2024 2004 CT Head Without Contrast  My independent interpretation of the CT of the head is no hemorrhage [TR]   2035 Discussing with Dr. Reardon with A.  She agrees to admit.  The patient has a leukocytosis, altered mental status, and urinary incontinence.  I suspect UTI.  Starting IV antibiotics.  Head CT and chest x-ray are clear. [TR]   2115 I reviewed the workup and findings with the patient and family at the bedside.  Answered all questions.  Plan admission.  They are agreeable. [TR]      ED Course User Index  [TR] Juan Crowley MD             AS OF 21:15 EST VITALS:    BP - 114/67  HR - 84  TEMP - 97.4 °F (36.3 °C) (Oral)  O2 SATS - 100%    COMPLEXITY OF CARE  The patient requires admission.      DIAGNOSIS  Final diagnoses:   Altered mental status, unspecified altered mental status type   Leukocytosis, unspecified type         DISPOSITION  ED Disposition       ED Disposition   Decision to Admit    Condition   --    Comment   Level of Care: Telemetry [5]   Diagnosis: Altered mental status [780.97.ICD-9-CM]   Admitting Physician: JOHN REARDON [[id number]]   Attending Physician: JOHN REARDON [[id number]]                  Please note that portions of this document were completed with a voice recognition program.    Note Disclaimer: At [facility], we believe that sharing information builds trust and better relationships. You are receiving this note because you recently visited [facility]. It is possible you will see health information before a provider has talked with you about it. This kind of information can be easy to misunderstand. To help you fully understand what it means for your health, we urge you to discuss this note with your provider.         Juan Crowley MD  01/21/24 2115

## 2024-01-22 NOTE — PROGRESS NOTES
"[facility] Clinical Pharmacy Services: Enoxaparin Consult    Anthony Gallegos has a pharmacy consult to dose enoxaparin per Dr Reardon's request.     Indication: Prophylaxis  Home Anticoagulation:       Relevant clinical data and objective history reviewed:  78 y.o. male 190.5 cm (75\") 81.6 kg (180 lb)   Body mass index is 22.5 kg/m².  Estimated Creatinine Clearance: 38.6 mL/min (A) (by C-G formula based on SCr of 1.79 mg/dL (H)).    Assessment/Plan    Will start patient on 40mg subcutaneous every 24 hours, adjusted for renal function. Consult order will be discontinued but pharmacy will continue to follow.     Jasper Ríos, [facility]  Clinical Pharmacist    "

## 2024-01-22 NOTE — PLAN OF CARE
Goal Outcome Evaluation:  Plan of Care Reviewed With: patient         Patient is a 78 y.o. male admitted to [facility] for AMS on 1/21/2024. PMHx includes HTN, arthritis, and ankylosing spondylitis. Pt was recently admitted to [facility] in December for a fall, prior to that admit was living at home alone. Pt was discharged to SNF where he has been since last hospital stay. Today, patient performed bed mobility with modA x2, required modA x2 for transfers, and ambulated Chuckie x2 5' over to the chair with use of RW. Strength, balance, and activity tolerance deficits noted. Pt req's inc'd time for all mobility today. Patient may benefit from skilled PT services acutely to address functional deficits as well as improve level of independence prior to discharge. Anticipate SNF upon DC.        Anticipated Discharge Disposition (PT): skilled nursing facility

## 2024-01-22 NOTE — PROGRESS NOTES
"Nutrition Services    Patient Name:  Anthony Gallegos  YOB: 1945  MRN: [id number]  Admit Date:  1/21/2024  Assessment Date:  01/22/24    NUTRITION SCREENING      Reason for Encounter MST score 2+, Admission assessment   Diagnosis/Problem AMS       PO Diet Diet: Cardiac Diets; Healthy Heart (2-3 Na+); Texture: Regular Texture (IDDSI 7); Fluid Consistency: Thin (IDDSI 0)   Supplements    PO Intake % Po intake usually good, likes ensure       Medications MAR reviewed by RD   Labs  Listed below, reviewed   Physical Findings Room air   GI Function Last BM 1/21   Skin Status WNL       Height  Weight  BMI  Weight Trend     Height: 190.5 cm (75\")  Weight: 75.1 kg (165 lb 9.1 oz) (01/22/24 0502)  Body mass index is 20.69 kg/m².  Loss, Gain       Nutrition Problem (PES) No nutrition diagnosis at this time.       Intervention/Plan RD to follow up per protocol.  Offer snack/supplement as desired     Results from last 7 days   Lab Units 01/22/24  0437 01/21/24  1852   SODIUM mmol/L 138 139   POTASSIUM mmol/L 4.5 4.7   CHLORIDE mmol/L 101 100   CO2 mmol/L 23.4 26.0   BUN mg/dL 30* 27*   CREATININE mg/dL 1.58* 1.79*   CALCIUM mg/dL 8.8 9.5   BILIRUBIN mg/dL  --  0.5   ALK PHOS U/L  --  80   ALT (SGPT) U/L  --  22   AST (SGOT) U/L  --  47*   GLUCOSE mg/dL 108* 123*     Results from last 7 days   Lab Units 01/22/24  0437   HEMOGLOBIN g/dL 13.1   HEMATOCRIT % 39.8     No results found for: \"HGBA1C\"      Electronically signed by:  Tamar Ott RD  01/22/24 15:32 EST  "

## 2024-01-22 NOTE — ED NOTES
Nursing report ED to floor  Anthony Gallegos  78 y.o.  male    HPI :   Chief Complaint   Patient presents with    Dysuria    Altered Mental Status       Admitting doctor:   Casie Reardon MD    Admitting diagnosis:   The primary encounter diagnosis was Altered mental status, unspecified altered mental status type. A diagnosis of Leukocytosis, unspecified type was also pertinent to this visit.    Code status:   Current Code Status       Date Active Code Status Order ID Comments User Context       1/21/2024 2052 CPR (Attempt to Resuscitate) [id number]  Casie Reardon MD ED        Question Answer    Code Status (Patient has no pulse and is not breathing) CPR (Attempt to Resuscitate)    Medical Interventions (Patient has pulse or is breathing) Full                    Allergies:   Patient has no known allergies.    Isolation:   No active isolations    Intake and Output  No intake or output data in the 24 hours ending 01/21/24 2152    Weight:       01/21/24  1744   Weight: 81.6 kg (180 lb)       Most recent vitals:   Vitals:    01/21/24 1800 01/21/24 1804 01/1945 01/21/24 2113   BP: 140/73  132/99 114/67   BP Location:   Right arm Right arm   Patient Position:   Sitting Lying   Pulse:  88 87 84   Resp:   17 16   Temp:       TempSrc:       SpO2:   (!) 8% 100%   Weight:       Height:           Active LDAs/IV Access:   Lines, Drains & Airways       Active LDAs       Name Placement date Placement time Site Days    Peripheral IV 01/21/24 1844 Anterior;Left;Proximal Forearm 01/21/24 1844  Forearm  less than 1                    Labs (abnormal labs have a star):   Labs Reviewed   COMPREHENSIVE METABOLIC PANEL - Abnormal; Notable for the following components:       Result Value    Glucose 123 (*)     BUN 27 (*)     Creatinine 1.79 (*)     Albumin 3.3 (*)     AST (SGOT) 47 (*)     eGFR 38.1 (*)     All other components within normal limits    Narrative:     GFR Normal >60  Chronic Kidney Disease <60  Kidney  Failure <15    The GFR formula is only valid for adults with stable renal function between ages 18 and 70.   CBC WITH AUTO DIFFERENTIAL - Abnormal; Notable for the following components:    WBC 17.86 (*)     Hemoglobin 12.3 (*)     All other components within normal limits   MANUAL DIFFERENTIAL - Abnormal; Notable for the following components:    Neutrophil % 90.0 (*)     Lymphocyte % 5.0 (*)     Neutrophils Absolute 16.07 (*)     All other components within normal limits   BLOOD CULTURE   BLOOD CULTURE   LACTIC ACID, PLASMA   URINALYSIS W/ CULTURE IF INDICATED   CBC AND DIFFERENTIAL    Narrative:     The following orders were created for panel order CBC & Differential.  Procedure                               Abnormality         Status                     ---------                               -----------         ------                     CBC Auto Differential[[id number]]        Abnormal            Final result                 Please view results for these tests on the individual orders.       EKG:   No orders to display       Meds given in ED:   Medications   sodium chloride 0.9 % flush 10 mL (has no administration in time range)   cefTRIAXone (ROCEPHIN) 2,000 mg in sodium chloride 0.9 % 100 mL IVPB-VTB (has no administration in time range)   acetaminophen (TYLENOL) tablet 650 mg (has no administration in time range)   sennosides-docusate (PERICOLACE) 8.6-50 MG per tablet 2 tablet (has no administration in time range)     And   polyethylene glycol (MIRALAX) packet 17 g (has no administration in time range)     And   bisacodyl (DULCOLAX) EC tablet 5 mg (has no administration in time range)     And   bisacodyl (DULCOLAX) suppository 10 mg (has no administration in time range)   ondansetron ODT (ZOFRAN-ODT) disintegrating tablet 4 mg (has no administration in time range)     Or   ondansetron (ZOFRAN) injection 4 mg (has no administration in time range)   melatonin tablet 3 mg (has no administration in time range)    Pharmacy to Dose enoxaparin (LOVENOX) (has no administration in time range)   sodium chloride 0.9 % infusion (has no administration in time range)   cefTRIAXone (ROCEPHIN) 2,000 mg in sodium chloride 0.9 % 100 mL IVPB-VTB (has no administration in time range)   sodium chloride 0.9 % bolus 500 mL (0 mL Intravenous Stopped 1/21/24 2112)       Imaging results:  XR Chest 1 View    Result Date: 1/21/2024  Negative.  This report was finalized on 1/21/2024 6:59 PM by Dr. Walter Henderson M.D on Workstation: [employer]      CT Head Without Contrast    Result Date: 1/21/2024  Chronic changes in the brain. No acute abnormality.   Radiation dose reduction techniques were utilized, including automated exposure control and exposure modulation based on body size.   This report was finalized on 1/21/2024 6:56 PM by Dr. Walter Henderson M.D on Workstation: [employer]       Ambulatory status:   -     Social issues:   Social History     Socioeconomic History    Marital status:     Number of children: 2   Tobacco Use    Smoking status: Never    Smokeless tobacco: Never   Vaping Use    Vaping Use: Never used   Substance and Sexual Activity    Alcohol use: No    Drug use: No    Sexual activity: Yes       NIH Stroke Scale:       Cindy West RN  01/21/24 21:52 EST

## 2024-01-22 NOTE — THERAPY EVALUATION
Patient Name: Anthony Gallegos  : 1945    MRN: [id number]                              Today's Date: 2024       Admit Date: 2024    Visit Dx:     ICD-10-CM ICD-9-CM   1. Altered mental status, unspecified altered mental status type  R41.82 780.97   2. Leukocytosis, unspecified type  D72.829 288.60     Patient Active Problem List   Diagnosis    Ankylosing spondylitis of cervical region    Recent unexplained weight loss    Abnormal serum lipase level    Abnormal serum level of amylase    Hypertension    Boil    Immobility    Fall from bed    Tetrahydrocannabinol (THC) use disorder, mild, abuse    Generalized pain        Grief    DISH (disseminated idiopathic skeletal hyperostosis)    Generalized weakness    Severe malnutrition    Altered mental status    Transient alteration of awareness    GERD without esophagitis    BPH (benign prostatic hyperplasia)    Leukocytosis    UTI (urinary tract infection)    Dehydration    MARTITA (acute kidney injury)    Hyperglycemia    Altered mental state     Past Medical History:   Diagnosis Date    Abscess of scrotum     Arthritis     AS (ankylosing spondylitis)     Hypertension     Tetrahydrocannabinol (THC) use disorder, mild, abuse 2023    Uveitis      Past Surgical History:   Procedure Laterality Date    COLONOSCOPY      ROTATOR CUFF REPAIR Right     TOTAL HIP ARTHROPLASTY Left       General Information       Row Name 24 1417          Physical Therapy Time and Intention    Document Type evaluation  -DB     Mode of Treatment individual therapy;physical therapy  -DB       Row Name 24 1417          General Information    Patient Profile Reviewed yes  -DB     Prior Level of Function --  (I) prior to admit in December, has been at SNF since last D/C  -DB     Existing Precautions/Restrictions fall  -DB     Barriers to Rehab medically complex  -DB       Row Name 24 1417          Living Environment    People in Home alone;facility resident   admit from SNF, alone prior to most recent hospital stay  -DB       Row Name 01/22/24 1417          Safety Issues, Functional Mobility    Safety Issues Affecting Function (Mobility) ability to follow commands;problem-solving;insight into deficits/self-awareness;sequencing abilities  -DB     Impairments Affecting Function (Mobility) balance;endurance/activity tolerance;strength  -DB               User Key  (r) = Recorded By, (t) = Taken By, (c) = Cosigned By      Initials Name Provider Type    DB Felipa Weathers PT Physical Therapist                   Mobility       Row Name 01/22/24 1424          Bed Mobility    Bed Mobility supine-sit  -DB     Supine-Sit [place] (Bed Mobility) moderate assist (50% patient effort);2 person assist;verbal cues;nonverbal cues (demo/gesture)  -DB     Assistive Device (Bed Mobility) bed rails;head of bed elevated  -DB       Row Name 01/22/24 1424          Sit-Stand Transfer    Sit-Stand [place] (Transfers) moderate assist (50% patient effort);2 person assist;verbal cues;nonverbal cues (demo/gesture)  -DB       Row Name 01/22/24 1424          Gait/Stairs (Locomotion)    [place] Level (Gait) minimum assist (75% patient effort);2 person assist;verbal cues;nonverbal cues (demo/gesture)  -DB     Assistive Device (Gait) walker, front-wheeled  -DB     Distance in Feet (Gait) 5  x2 side steps at EOB  -DB     Deviations/Abnormal Patterns (Gait) festinating/shuffling;stride length decreased;gait speed decreased;ranjit decreased;base of support, narrow  -DB     Bilateral Gait Deviations forward flexed posture;heel strike decreased  -DB               User Key  (r) = Recorded By, (t) = Taken By, (c) = Cosigned By      Initials Name Provider Type    DB Felipa Weathers PT Physical Therapist                   Obj/Interventions       Row Name 01/22/24 1426          Range of Motion Comprehensive    General Range of Motion bilateral lower extremity ROM WFL  -DB       Row Name 01/22/24  1426          Strength Comprehensive (MMT)    Comment, General Manual Muscle Testing (MMT) Assessment generalized weakness  -DB       Row Name 01/22/24 1426          Balance    Balance Assessment sitting static balance;sitting dynamic balance;standing static balance;standing dynamic balance  -DB     Static Sitting Balance standby assist  -DB     Dynamic Sitting Balance contact guard  -DB     Position, Sitting Balance unsupported;sitting edge of bed  -DB     Static Standing Balance contact guard;verbal cues  -DB     Dynamic Standing Balance minimal assist;2-person assist;verbal cues  -DB     Position/Device Used, Standing Balance supported;walker, front-wheeled  -DB     Balance Interventions sitting;standing;sit to stand  -DB               User Key  (r) = Recorded By, (t) = Taken By, (c) = Cosigned By      Initials Name Provider Type    DB Felipa Weathers, PT Physical Therapist                   Goals/Plan       Row Name 01/22/24 1427          Bed Mobility Goal 1 (PT)    Activity/Assistive Device (Bed Mobility Goal 1, PT) bed mobility activities, all  -DB     [place] Level/Cues Needed (Bed Mobility Goal 1, PT) supervision required  -DB     Time Frame (Bed Mobility Goal 1, PT) 1 week  -DB       Row Name 01/22/24 1427          Transfer Goal 1 (PT)    Activity/Assistive Device (Transfer Goal 1, PT) transfers, all  -DB     [place] Level/Cues Needed (Transfer Goal 1, PT) supervision required  -DB     Time Frame (Transfer Goal 1, PT) 1 week  -DB       Row Name 01/22/24 1427          Gait Training Goal 1 (PT)    Activity/Assistive Device (Gait Training Goal 1, PT) gait (walking locomotion)  -DB     [place] Level (Gait Training Goal 1, PT) supervision required  -DB     Time Frame (Gait Training Goal 1, PT) 1 week  -DB       Row Name 01/22/24 1427          Therapy Assessment/Plan (PT)    Planned Therapy Interventions (PT) balance training;bed mobility training;gait training;home exercise program;neuromuscular  re-education;patient/family education;strengthening;ROM (range of motion);stair training;postural re-education;transfer training  -DB               User Key  (r) = Recorded By, (t) = Taken By, (c) = Cosigned By      Initials Name Provider Type    Felipa Chang, GERI Physical Therapist                   Clinical Impression       Row Name 01/22/24 1426          Pain    Pain Intervention(s) Ambulation/increased activity;Repositioned  -DB       Row Name 01/22/24 1426          Plan of Care Review    Plan of Care Reviewed With patient  -DB     Outcome Evaluation Patient is a 78 y.o. male admitted to [facility] for AMS on 1/21/2024. PMHx includes HTN, arthritis, and ankylosing spondylitis. Pt was recently admitted to [facility] in December for a fall, prior to that admit was living at home alone. Pt was discharged to SNF where he has been since last hospital stay. Today, patient performed bed mobility with modA x2, required modA x2 for transfers, and ambulated Chuckie x2 5' over to the chair with use of RW. Strength, balance, and activity tolerance deficits noted. Pt req's inc'd time for all mobility today. Patient may benefit from skilled PT services acutely to address functional deficits as well as improve level of independence prior to discharge. Anticipate SNF upon DC.  -DB       Row Name 01/22/24 1426          Therapy Assessment/Plan (PT)    Rehab Potential (PT) good, to achieve stated therapy goals  -DB     Criteria for Skilled Interventions Met (PT) yes  -DB     Therapy Frequency (PT) 6 times/wk  -DB       Row Name 01/22/24 1426          Vital Signs    O2 Delivery Pre Treatment room air  -DB     O2 Delivery Intra Treatment room air  -DB     O2 Delivery Post Treatment room air  -DB     Pre Patient Position Supine  -DB     Intra Patient Position Standing  -DB     Post Patient Position Sitting  -DB       Row Name 01/22/24 1426          Positioning and Restraints    Pre-Treatment Position in bed  -DB     Post Treatment Position  chair  -DB     In Chair call light within reach;sitting;encouraged to call for assist;exit alarm on;with nsg  -DB               User Key  (r) = Recorded By, (t) = Taken By, (c) = Cosigned By      Initials Name Provider Type    Felipa Chang, GERI Physical Therapist                   Outcome Measures       Row Name 01/22/24 1427 01/22/24 0434       How much help from another person do you currently need...    Turning from your back to your side while in flat bed without using bedrails? 3  -DB 3  -DG    Moving from lying on back to sitting on the side of a flat bed without bedrails? 2  -DB 3  -DG    Moving to and from a bed to a chair (including a wheelchair)? 2  -DB 3  -DG    Standing up from a chair using your arms (e.g., wheelchair, bedside chair)? 2  -DB 3  -DG    Climbing 3-5 steps with a railing? 1  -DB 2  -DG    To walk in hospital room? 2  -DB 3  -DG    AM-PAC 6 Clicks Score (PT) 12  -DB 17  -DG    Highest Level of Mobility Goal 4 --> Transfer to chair/commode  -DB 5 --> Static standing  -DG      Row Name 01/22/24 1427          Functional Assessment    Outcome Measure Options AM-PAC 6 Clicks Basic Mobility (PT)  -DB               User Key  (r) = Recorded By, (t) = Taken By, (c) = Cosigned By      Initials Name Provider Type    Lacy Ferguson, RN Registered Nurse    Felipa Chang, GERI Physical Therapist                                 Physical Therapy Education       Title: PT OT SLP Therapies (Done)       Topic: Physical Therapy (Done)       Point: Mobility training (Done)       Learning Progress Summary             Patient Acceptance, E, VU,NR by DB at 1/22/2024 1427                         Point: Home exercise program (Done)       Learning Progress Summary             Patient Acceptance, E, VU,NR by DB at 1/22/2024 1427                         Point: Body mechanics (Done)       Learning Progress Summary             Patient Acceptance, E, VU,NR by DB at 1/22/2024 1427                         Point:  Precautions (Done)       Learning Progress Summary             Patient Acceptance, E, VU,NR by DB at 1/22/2024 1427                                         User Key       Initials Effective Dates Name Provider Type Discipline    DB 06/16/21 -  Felipa Weathers PT Physical Therapist PT                  PT Recommendation and Plan  Planned Therapy Interventions (PT): balance training, bed mobility training, gait training, home exercise program, neuromuscular re-education, patient/family education, strengthening, ROM (range of motion), stair training, postural re-education, transfer training  Plan of Care Reviewed With: patient  Outcome Evaluation: Patient is a 78 y.o. male admitted to [facility] for AMS on 1/21/2024. PMHx includes HTN, arthritis, and ankylosing spondylitis. Pt was recently admitted to [facility] in December for a fall, prior to that admit was living at home alone. Pt was discharged to SNF where he has been since last hospital stay. Today, patient performed bed mobility with modA x2, required modA x2 for transfers, and ambulated Chuckie x2 5' over to the chair with use of RW. Strength, balance, and activity tolerance deficits noted. Pt req's inc'd time for all mobility today. Patient may benefit from skilled PT services acutely to address functional deficits as well as improve level of independence prior to discharge. Anticipate SNF upon DC.     Time Calculation:         PT Charges       Row Name 01/22/24 1431             Time Calculation    Start Time 1336  -DB      Stop Time 1400  -DB      Time Calculation (min) 24 min  -DB      PT Received On 01/22/24  -DB      PT - Next Appointment 01/23/24  -DB      PT Goal Re-Cert Due Date 01/29/24  -DB         Time Calculation- PT    Total Timed Code Minutes- PT 8 minute(s)  -DB                User Key  (r) = Recorded By, (t) = Taken By, (c) = Cosigned By      Initials Name Provider Type    DB Felipa Weathers, PT Physical Therapist                  Therapy Charges for Today        Code Description Service Date Service Provider Modifiers Qty    [id number]  PT THERAPEUTIC ACT EA 15 MIN 1/22/2024 Felipa Weathers, PT GP 1    [id number] HC PT EVAL MOD COMPLEXITY 3 1/22/2024 Felipa Weathers, PT GP 1            PT G-Codes  Outcome Measure Options: AM-PAC 6 Clicks Basic Mobility (PT)  AM-PAC 6 Clicks Score (PT): 12  PT Discharge Summary  Anticipated Discharge Disposition (PT): skilled nursing facility    Felipa Weathers PT  1/22/2024

## 2024-01-23 ENCOUNTER — APPOINTMENT (OUTPATIENT)
Dept: CT IMAGING | Facility: HOSPITAL | Age: 79
DRG: 690 | End: 2024-01-23
Payer: MEDICARE

## 2024-01-23 LAB
ANION GAP SERPL CALCULATED.3IONS-SCNC: 13.4 MMOL/L (ref 5–15)
ANISOCYTOSIS BLD QL: ABNORMAL
BUN SERPL-MCNC: 45 MG/DL (ref 8–23)
BUN/CREAT SERPL: 20.9 (ref 7–25)
CALCIUM SPEC-SCNC: 8 MG/DL (ref 8.6–10.5)
CHLORIDE SERPL-SCNC: 105 MMOL/L (ref 98–107)
CHLORIDE UR-SCNC: 75 MMOL/L
CO2 SERPL-SCNC: 20.6 MMOL/L (ref 22–29)
CREAT SERPL-MCNC: 2.15 MG/DL (ref 0.76–1.27)
CREAT UR-MCNC: 34.5 MG/DL
CREAT UR-MCNC: 39.6 MG/DL
DACRYOCYTES BLD QL SMEAR: ABNORMAL
DEPRECATED RDW RBC AUTO: 42.6 FL (ref 37–54)
EGFRCR SERPLBLD CKD-EPI 2021: 30.6 ML/MIN/1.73
ELLIPTOCYTES BLD QL SMEAR: ABNORMAL
EOSINOPHIL SPEC QL MICRO: 0 % EOS/100 CELLS (ref 0–0)
ERYTHROCYTE [DISTWIDTH] IN BLOOD BY AUTOMATED COUNT: 13 % (ref 12.3–15.4)
GLUCOSE SERPL-MCNC: 96 MG/DL (ref 65–99)
HBA1C MFR BLD: 5.8 % (ref 4.8–5.6)
HCT VFR BLD AUTO: 34.6 % (ref 37.5–51)
HGB BLD-MCNC: 11.8 G/DL (ref 13–17.7)
LYMPHOCYTES # BLD MANUAL: 0.65 10*3/MM3 (ref 0.7–3.1)
LYMPHOCYTES NFR BLD MANUAL: 3 % (ref 5–12)
MACROCYTES BLD QL SMEAR: ABNORMAL
MAGNESIUM SERPL-MCNC: 2.5 MG/DL (ref 1.6–2.4)
MCH RBC QN AUTO: 31.1 PG (ref 26.6–33)
MCHC RBC AUTO-ENTMCNC: 34.1 G/DL (ref 31.5–35.7)
MCV RBC AUTO: 91.3 FL (ref 79–97)
MONOCYTES # BLD: 0.65 10*3/MM3 (ref 0.1–0.9)
NEUTROPHILS # BLD AUTO: 20.4 10*3/MM3 (ref 1.7–7)
NEUTROPHILS NFR BLD MANUAL: 94 % (ref 42.7–76)
NRBC BLD AUTO-RTO: 0 /100 WBC (ref 0–0.2)
OVALOCYTES BLD QL SMEAR: ABNORMAL
PLAT MORPH BLD: NORMAL
PLATELET # BLD AUTO: 262 10*3/MM3 (ref 140–450)
PMV BLD AUTO: 8.8 FL (ref 6–12)
POIKILOCYTOSIS BLD QL SMEAR: ABNORMAL
POLYCHROMASIA BLD QL SMEAR: ABNORMAL
POTASSIUM SERPL-SCNC: 4.7 MMOL/L (ref 3.5–5.2)
PROT ?TM UR-MCNC: 292.3 MG/DL
PROT/CREAT UR: 8472.5 MG/G CREA (ref 0–200)
RBC # BLD AUTO: 3.79 10*6/MM3 (ref 4.14–5.8)
SODIUM SERPL-SCNC: 139 MMOL/L (ref 136–145)
SODIUM UR-SCNC: 107 MMOL/L
URATE SERPL-MCNC: 5.9 MG/DL (ref 3.4–7)
UUN 24H UR-MCNC: 273 MG/DL
VARIANT LYMPHS NFR BLD MANUAL: 3 % (ref 19.6–45.3)
WBC MORPH BLD: NORMAL
WBC NRBC COR # BLD AUTO: 21.7 10*3/MM3 (ref 3.4–10.8)

## 2024-01-23 PROCEDURE — 97110 THERAPEUTIC EXERCISES: CPT

## 2024-01-23 PROCEDURE — 85025 COMPLETE CBC W/AUTO DIFF WBC: CPT | Performed by: STUDENT IN AN ORGANIZED HEALTH CARE EDUCATION/TRAINING PROGRAM

## 2024-01-23 PROCEDURE — 82436 ASSAY OF URINE CHLORIDE: CPT | Performed by: INTERNAL MEDICINE

## 2024-01-23 PROCEDURE — 83036 HEMOGLOBIN GLYCOSYLATED A1C: CPT | Performed by: STUDENT IN AN ORGANIZED HEALTH CARE EDUCATION/TRAINING PROGRAM

## 2024-01-23 PROCEDURE — 84550 ASSAY OF BLOOD/URIC ACID: CPT | Performed by: HOSPITALIST

## 2024-01-23 PROCEDURE — 80048 BASIC METABOLIC PNL TOTAL CA: CPT | Performed by: STUDENT IN AN ORGANIZED HEALTH CARE EDUCATION/TRAINING PROGRAM

## 2024-01-23 PROCEDURE — 84300 ASSAY OF URINE SODIUM: CPT | Performed by: HOSPITALIST

## 2024-01-23 PROCEDURE — 84540 ASSAY OF URINE/UREA-N: CPT | Performed by: HOSPITALIST

## 2024-01-23 PROCEDURE — 74176 CT ABD & PELVIS W/O CONTRAST: CPT

## 2024-01-23 PROCEDURE — 25010000002 ENOXAPARIN PER 10 MG: Performed by: INTERNAL MEDICINE

## 2024-01-23 PROCEDURE — 87205 SMEAR GRAM STAIN: CPT | Performed by: HOSPITALIST

## 2024-01-23 PROCEDURE — 85007 BL SMEAR W/DIFF WBC COUNT: CPT | Performed by: STUDENT IN AN ORGANIZED HEALTH CARE EDUCATION/TRAINING PROGRAM

## 2024-01-23 PROCEDURE — 25810000003 SODIUM CHLORIDE 0.9 % SOLUTION: Performed by: INTERNAL MEDICINE

## 2024-01-23 PROCEDURE — 87102 FUNGUS ISOLATION CULTURE: CPT | Performed by: HOSPITALIST

## 2024-01-23 PROCEDURE — 83735 ASSAY OF MAGNESIUM: CPT | Performed by: STUDENT IN AN ORGANIZED HEALTH CARE EDUCATION/TRAINING PROGRAM

## 2024-01-23 PROCEDURE — 82570 ASSAY OF URINE CREATININE: CPT | Performed by: HOSPITALIST

## 2024-01-23 PROCEDURE — 84156 ASSAY OF PROTEIN URINE: CPT | Performed by: HOSPITALIST

## 2024-01-23 PROCEDURE — 25010000002 CEFTRIAXONE PER 250 MG: Performed by: INTERNAL MEDICINE

## 2024-01-23 RX ORDER — ENOXAPARIN SODIUM 100 MG/ML
30 INJECTION SUBCUTANEOUS EVERY 24 HOURS
Status: DISCONTINUED | OUTPATIENT
Start: 2024-01-24 | End: 2024-02-01 | Stop reason: HOSPADM

## 2024-01-23 RX ADMIN — ACETAMINOPHEN 650 MG: 325 TABLET, FILM COATED ORAL at 20:17

## 2024-01-23 RX ADMIN — Medication 3 MG: at 20:17

## 2024-01-23 RX ADMIN — PANTOPRAZOLE SODIUM 40 MG: 40 TABLET, DELAYED RELEASE ORAL at 05:42

## 2024-01-23 RX ADMIN — TAMSULOSIN HYDROCHLORIDE 0.4 MG: 0.4 CAPSULE ORAL at 20:17

## 2024-01-23 RX ADMIN — CEFTRIAXONE 2000 MG: 2 INJECTION, POWDER, FOR SOLUTION INTRAMUSCULAR; INTRAVENOUS at 20:17

## 2024-01-23 RX ADMIN — SENNOSIDES AND DOCUSATE SODIUM 2 TABLET: 50; 8.6 TABLET ORAL at 08:49

## 2024-01-23 RX ADMIN — SODIUM CHLORIDE 100 ML/HR: 9 INJECTION, SOLUTION INTRAVENOUS at 05:42

## 2024-01-23 RX ADMIN — ATENOLOL 50 MG: 50 TABLET ORAL at 08:49

## 2024-01-23 RX ADMIN — ENOXAPARIN SODIUM 40 MG: 100 INJECTION SUBCUTANEOUS at 08:49

## 2024-01-23 RX ADMIN — SENNOSIDES AND DOCUSATE SODIUM 2 TABLET: 50; 8.6 TABLET ORAL at 20:17

## 2024-01-23 NOTE — PLAN OF CARE
Problem: Adult Inpatient Plan of Care  Goal: Plan of Care Review  Outcome: Ongoing, Progressing  Goal: Patient-Specific Goal (Individualized)  Outcome: Ongoing, Progressing  Goal: Absence of Hospital-Acquired Illness or Injury  Outcome: Ongoing, Progressing  Intervention: Identify and Manage Fall Risk  Description: Perform standard risk assessment on admission using a validated tool or comprehensive approach appropriate to the patient; reassess fall risk frequently, with change in status or transfer to another level of care.  Communicate fall injury risk to interprofessional healthcare team.  Determine need for increased observation, equipment and environmental modification, such as low bed, signage and supportive, nonskid footwear.  Adjust safety measures to individual developmental age, stage and identified risk factors.  Reinforce the importance of safety and physical activity with patient and family.  Perform regular intentional rounding to assess need for position change, pain assessment and personal needs, including assistance with toileting.  Intervention: Prevent Skin Injury  Description: Perform a screening for skin injury risk, such as pressure or moisture associated skin damage on admission and at regular intervals throughout hospital stay.  Keep all areas of skin (especially folds) clean and dry.  Maintain adequate skin hydration.  Relieve and redistribute pressure and protect bony prominences; implement measures based on patient-specific risk factors.  Match turning and repositioning schedule to clinical condition.  Encourage weight shift frequently; assist with reposition if unable to complete independently.  Float heels off bed; avoid pressure on the Achilles tendon.  Keep skin free from extended contact with medical devices.  Encourage functional activity and mobility, as early as tolerated.  Use aids (e.g., slide boards, mechanical lift) during transfer.  Intervention: Prevent and Manage VTE  (Venous Thromboembolism) Risk  Description: Assess for VTE (venous thromboembolism) risk.  Encourage and assist with early ambulation.  Initiate and maintain compression or other therapy, as indicated, based on identified risk in accordance with organizational protocol and provider order.  Encourage both active and passive leg exercises while in bed, if unable to ambulate.  Goal: Optimal Comfort and Wellbeing  Outcome: Ongoing, Progressing  Intervention: Provide Person-Centered Care  Description: Use a family-focused approach to care.  Develop trust and rapport by proactively providing information, encouraging questions, addressing concerns and offering reassurance.  Acknowledge emotional response to hospitalization.  Recognize and utilize personal coping strategies.  Honor spiritual and cultural preferences.  Goal: Readiness for Transition of Care  Outcome: Ongoing, Progressing     Problem: UTI (Urinary Tract Infection)  Goal: Improved Infection Symptoms  Outcome: Ongoing, Progressing     Problem: Fall Injury Risk  Goal: Absence of Fall and Fall-Related Injury  Outcome: Ongoing, Progressing  Intervention: Identify and Manage Contributors  Description: Develop a fall prevention plan with the patient and caregiver/family.  Provide reorientation, appropriate sensory stimulation and routines with changes in mental status to decrease risk of fall.  Promote use of personal vision and auditory aids.  Assess assistance level required for safe and effective self-care; provide support as needed, such as toileting, mobilization. For age 65 and older, implement timed toileting with assistance.  Encourage physical activity, such as performance of mobility and self-care at highest level of patient ability, multicomponent exercise program and provision of appropriate assistive devices.  If fall occurs, assess the severity of injury; implement fall injury protocol. Determine the cause and revise fall injury prevention  plan.  Regularly review medication contribution to fall risk; adjust medication administration times to minimize risk of falling.  Consider risk related to polypharmacy and age.  Balance adequate pain management with potential for oversedation.  Intervention: Promote Injury-Free Environment  Description: Provide a safe, barrier-free environment that encourages independent activity.  Keep care area uncluttered and well-lighted.  Determine need for increased observation or monitoring.  Avoid use of devices that minimize mobility, such as restraints or indwelling urinary catheter.     Problem: Adult Inpatient Plan of Care  Goal: Absence of Hospital-Acquired Illness or Injury  Intervention: Prevent and Manage VTE (Venous Thromboembolism) Risk  Description: Assess for VTE (venous thromboembolism) risk.  Encourage and assist with early ambulation.  Initiate and maintain compression or other therapy, as indicated, based on identified risk in accordance with organizational protocol and provider order.  Encourage both active and passive leg exercises while in bed, if unable to ambulate.   Goal Outcome Evaluation:  Plan of Care Reviewed With: patient        Progress: no change

## 2024-01-23 NOTE — PLAN OF CARE
Goal Outcome Evaluation:  Plan of Care Reviewed With: patient        Progress: declining  Outcome Evaluation: Pt agreed to try PT session, but weak and sore upon entry, pt alexei sup/sit w/mod 2, but standing attempts req max/dep 2-3 and unable to achieve full standing, RN and Tiarra assisted as pt req orthostatic BP checks and incorporated into PT session with their help, unsafe EOB so returned to bed after standing attempt      Anticipated Discharge Disposition (PT): skilled nursing facility

## 2024-01-23 NOTE — PROGRESS NOTES
[facility]    [phone]   LOS: 1 day     Name: Anthony Gallegos  Age/Sex: 78 y.o. male  :  1945        PCP: Provider, No Known  Chief Complaint   Patient presents with    Dysuria    Altered Mental Status      Subjective   He sitting up in the bed today in no distress he remembers me from the last hospitalization denies new issues right now.  Rested decently well overnight.  No fevers or chills he denies abdominal pain or back pain this morning  General: No Fever or Chills, Cardiac: No Chest Pain or Palpitations, Resp: No Cough or SOA, GI: No Nausea, Vomiting, or Diarrhea, and Other: No bleeding    atenolol, 50 mg, Oral, Q24H  cefTRIAXone, 2,000 mg, Intravenous, Q24H  enoxaparin, 40 mg, Subcutaneous, Q24H  pantoprazole, 40 mg, Oral, Q AM  senna-docusate sodium, 2 tablet, Oral, BID  tamsulosin, 0.4 mg, Oral, Nightly      sodium chloride, 100 mL/hr, Last Rate: 100 mL/hr (24 0542)        Objective   Vital Signs  Temp:  [99.1 °F (37.3 °C)-100.6 °F (38.1 °C)] 99.1 °F (37.3 °C)  Heart Rate:  [75-80] 76  Resp:  [18] 18  BP: (101-139)/(57-74) 101/57  Body mass index is 20.17 kg/m².    Intake/Output Summary (Last 24 hours) at 2024 1237  Last data filed at 2024 0904  Gross per 24 hour   Intake 236 ml   Output 1100 ml   Net -864 ml       Physical Exam  Vitals and nursing note reviewed.   Constitutional:       General: He is not in acute distress.     Appearance: He is ill-appearing.   Cardiovascular:      Rate and Rhythm: Normal rate and regular rhythm.   Pulmonary:      Effort: No respiratory distress.      Breath sounds: Normal breath sounds.   Abdominal:      General: Bowel sounds are normal.      Palpations: Abdomen is soft.   Neurological:      General: No focal deficit present.      Mental Status: He is alert. Mental status is at baseline.           Results Review:       I reviewed the patient's new clinical results.  Results from last 7 days   Lab Units 24  9383  01/22/24  0437 01/21/24  1852   WBC 10*3/mm3 21.70* 15.81* 17.86*   HEMOGLOBIN g/dL 11.8* 13.1 12.3*   PLATELETS 10*3/mm3 262 350 372     Results from last 7 days   Lab Units 01/23/24  0418 01/22/24  0437 01/21/24  1852   SODIUM mmol/L 139 138 139   POTASSIUM mmol/L 4.7 4.5 4.7   CHLORIDE mmol/L 105 101 100   CO2 mmol/L 20.6* 23.4 26.0   BUN mg/dL 45* 30* 27*   CREATININE mg/dL 2.15* 1.58* 1.79*   CALCIUM mg/dL 8.0* 8.8 9.5   MAGNESIUM mg/dL 2.5*  --   --    Estimated Creatinine Clearance: 28.8 mL/min (A) (by C-G formula based on SCr of 2.15 mg/dL (H)).      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       PLAN  Mr. Gallegos is a 78 y.o. male with a history of DISH, hypertension, ankylosing spondylitis and impaired mobility who presented to [facility] initially complaining of confusion and was found to have urinary tract infection and admitted to monitored unit  -His white blood cell count continues to rise and concerned about possible perinephric abscess or pyelonephritis.  He had significant urinary retention on the last hospitalization and did require Mariscal catheter but this resolved.  A stat CT scan was ordered after review of his labs this morning does reveal hydronephrosis as well as distended bladder and thickened bladder wall consistent with cystitis and possible pyelonephritis.  Will ask  urology to weigh in and go ahead and order a Mariscal catheter to be placed for urinary retention.  This will also probably help with his renal function.  -Given worsening renal function will asked nephrology to evaluate.  Baseline creatinine around 0.8 up to 2.1 today  -Awaiting blood and urine culture results continue Rocephin for now likely need broader coverage.  -His mental status for me seems back to baseline.  He remembers me from prior hospitalization  -Lovenox for DVT prophylaxis  -Full code      Disposition  Expected Discharge Date: 1/24/2024; Expected Discharge Time:        Shola Haque MD  [facility]  01/23/24  12:37 EST

## 2024-01-23 NOTE — CONSULTS
FIRST UROLOGY CONSULT      Patient Identification:  NAME:  Anthony Gallegos  Age:  78 y.o.   Sex:  male   :  1945   MRN:  [id number]     Chief complaint: AMS and dark urine    History of present illness:      Anthony Gallegos is a 78 y.o. male with a history of retention who presented to the ER on 24 for AMS and dark urine. Patient's family reports he has c/o dysuria and incontinence . Pt diagnosed and admitted to the hospital with for AMS, UTI and MARTITA r/t dehydration. A CT scan was performed and patient was noted to have a distended bladder. A forde catheter order was placed. Urology was consulted for evaluation and treatment of urinary retention.  Pt has a  history of retention and was previously seen in the hospital on 23 for urinary retention. Patient his a poor historian. Patient denies prior issues with urination although he required a forde catheter back in December.       In hospital:  -AVSS, good UOP  -WBC - 21.70  -Creat - 2.15  -UA - Large leukocytes, positive nitrites  -UCx - >100,000 CFU/mL Gram Negative Bacilli Abnormal      -CT - Independently reviewed- Urinary bladder wall distention and thickening, anterior bladder diverticulum, mild hydronephrosis    Asked to see    Past medical history:  Past Medical History:   Diagnosis Date    Abscess of scrotum     Arthritis     AS (ankylosing spondylitis)     Hypertension     Tetrahydrocannabinol (THC) use disorder, mild, abuse 2023    Uveitis        Past surgical history:  Past Surgical History:   Procedure Laterality Date    COLONOSCOPY      ROTATOR CUFF REPAIR Right     TOTAL HIP ARTHROPLASTY Left        Allergies:  Patient has no known allergies.    Home medications:  Medications Prior to Admission   Medication Sig Dispense Refill Last Dose    atenolol (TENORMIN) 50 MG tablet Take 1 tablet by mouth Daily. 30 tablet 0     esomeprazole (nexIUM) 20 MG capsule Take 1 capsule by mouth Daily As Needed (asneeded).       Lidocaine  4 % Place 2 patches on the skin as directed by provider Daily. Remove & Discard patch within 12 hours or as directed by MD 30 each 1     methotrexate 2.5 MG tablet Take 1 tablet by mouth 2 (Two) Times a Week. Pt takes every Wednesday and thursday  5     muscle rub (BenGay) 10-15 % cream cream Apply  topically to the appropriate area as directed 4 (Four) Times a Day As Needed (sore muscles). 85 g 0     Naproxen Sodium 220 MG capsule Take 220 mg of amoxicillin by mouth Daily.       Omega-3 Fatty Acids (OMEGA 3 PO) Take 1 capsule by mouth Daily.       pantoprazole (PROTONIX) 40 MG EC tablet Take 1 tablet by mouth Every Morning.       tamsulosin (FLOMAX) 0.4 MG capsule 24 hr capsule Take 1 capsule by mouth Every Night. 30 capsule 1         Hospital medications:  atenolol, 50 mg, Oral, Q24H  cefTRIAXone, 2,000 mg, Intravenous, Q24H  [START ON 1/24/2024] enoxaparin, 30 mg, Subcutaneous, Q24H  pantoprazole, 40 mg, Oral, Q AM  senna-docusate sodium, 2 tablet, Oral, BID  tamsulosin, 0.4 mg, Oral, Nightly      sodium chloride, 100 mL/hr, Last Rate: 100 mL/hr (01/23/24 0542)        acetaminophen    senna-docusate sodium **AND** polyethylene glycol **AND** bisacodyl **AND** bisacodyl    Calcium Replacement - Follow Nurse / BPA Driven Protocol    Magnesium Standard Dose Replacement - Follow Nurse / BPA Driven Protocol    melatonin    ondansetron ODT **OR** ondansetron    Phosphorus Replacement - Follow Nurse / BPA Driven Protocol    Potassium Replacement - Follow Nurse / BPA Driven Protocol    Insert Peripheral IV **AND** sodium chloride    Family history:  Family History   Problem Relation Age of Onset    Alzheimer's disease Mother        Social history:  Social History     Tobacco Use    Smoking status: Never    Smokeless tobacco: Never   Vaping Use    Vaping Use: Never used   Substance Use Topics    Alcohol use: No    Drug use: No       Review of systems:      12 point negative except as in HPI    Objective:  TMax 24 hours:    Temp (24hrs), Av.6 °F (37.6 °C), Min:98.8 °F (37.1 °C), Max:100.6 °F (38.1 °C)      Vitals Ranges:   Temp:  [98.8 °F (37.1 °C)-100.6 °F (38.1 °C)] 98.8 °F (37.1 °C)  Heart Rate:  [75-80] 77  Resp:  [18] 18  BP: ()/(57-74) 99/60    Intake/Output Last 3 shifts:  I/O last 3 completed shifts:  In: 682 [P.O.:682]  Out: 900 [Urine:900]     Physical Exam:    General Appearance:    Cooperative, NAD, ill-appearing   Back:     No CVA tenderness   Lungs:     Respirations unlabored, no wheezing   Abdomen:     Soft, NDNT, no masses, no guarding   :    Bladder distended, non-tender   Neuro/Psych:   Confused, mood/affect pleasant       Results review:   I reviewed the patient's new clinical results.    Data review:  Lab Results (last 24 hours)       Procedure Component Value Units Date/Time    Urine Culture - Urine, Urine, Clean Catch [[id number]]  (Abnormal) Collected: 24    Specimen: Urine, Clean Catch Updated: 24 09     Urine Culture >100,000 CFU/mL Gram Negative Bacilli    Narrative:      Colonization of the urinary tract without infection is common. Treatment is discouraged unless the patient is symptomatic, pregnant, or undergoing an invasive urologic procedure.    Uric Acid [[id number]]  (Normal) Collected: 24    Specimen: Blood Updated: 24 0911     Uric Acid 5.9 mg/dL     Manual Differential [[id number]]  (Abnormal) Collected: 24 0458    Specimen: Blood Updated: 24 0551     Neutrophil % 94.0 %      Lymphocyte % 3.0 %      Monocyte % 3.0 %      Neutrophils Absolute 20.40 10*3/mm3      Lymphocytes Absolute 0.65 10*3/mm3      Monocytes Absolute 0.65 10*3/mm3      Anisocytosis Slight/1+     Dacrocytes Slight/1+     Elliptocytes Mod/2+     Macrocytes Slight/1+     Ovalocytes Mod/2+     Poikilocytes Large/3+     Polychromasia Large/3+     WBC Morphology Normal     Platelet Morphology Normal    Magnesium [[id number]]  (Abnormal) Collected: 24 0418    Specimen: Blood  Updated: 01/23/24 0525     Magnesium 2.5 mg/dL     Basic Metabolic Panel [[id number]]  (Abnormal) Collected: 01/23/24 0418    Specimen: Blood Updated: 01/23/24 0525     Glucose 96 mg/dL      BUN 45 mg/dL      Creatinine 2.15 mg/dL      Sodium 139 mmol/L      Potassium 4.7 mmol/L      Chloride 105 mmol/L      CO2 20.6 mmol/L      Calcium 8.0 mg/dL      BUN/Creatinine Ratio 20.9     Anion Gap 13.4 mmol/L      eGFR 30.6 mL/min/1.73     Narrative:      GFR Normal >60  Chronic Kidney Disease <60  Kidney Failure <15    The GFR formula is only valid for adults with stable renal function between ages 18 and 70.    Hemoglobin A1c [[id number]]  (Abnormal) Collected: 01/23/24 0418    Specimen: Blood Updated: 01/23/24 0516     Hemoglobin A1C 5.80 %     Narrative:      Hemoglobin A1C Ranges:    Increased Risk for Diabetes  5.7% to 6.4%  Diabetes                     >= 6.5%  Diabetic Goal                < 7.0%    CBC & Differential [[id number]]  (Abnormal) Collected: 01/23/24 0458    Specimen: Blood Updated: 01/23/24 0514    Narrative:      The following orders were created for panel order CBC & Differential.  Procedure                               Abnormality         Status                     ---------                               -----------         ------                     CBC Auto Differential[[id number]]        Abnormal            Final result                 Please view results for these tests on the individual orders.    CBC Auto Differential [[id number]]  (Abnormal) Collected: 01/23/24 0458    Specimen: Blood Updated: 01/23/24 0514     WBC 21.70 10*3/mm3      RBC 3.79 10*6/mm3      Hemoglobin 11.8 g/dL      Hematocrit 34.6 %      MCV 91.3 fL      MCH 31.1 pg      MCHC 34.1 g/dL      RDW 13.0 %      RDW-SD 42.6 fl      MPV 8.8 fL      Platelets 262 10*3/mm3      nRBC 0.0 /100 WBC     Blood Culture - Blood, Hand, Right [[id number]]  (Normal) Collected: 01/22/24 0437    Specimen: Blood from Hand, Right Updated: 01/23/24 0508      Blood Culture No growth at 24 hours    Narrative:      Less than seven (7) mL's of blood was collected.  Insufficient quantity may yield false negative results.    Blood Culture - Blood, Hand, Right [[id number]]  (Normal) Collected: 01/21/24 2138    Specimen: Blood from Hand, Right Updated: 01/22/24 2201     Blood Culture No growth at 24 hours             Imaging:  Imaging Results (Last 24 Hours)       Procedure Component Value Units Date/Time    CT Abdomen Pelvis Without Contrast [[id number]] Collected: 01/23/24 0959     Updated: 01/23/24 1005    Narrative:      CT ABDOMEN AND PELVIS WITHOUT IV CONTRAST     HISTORY: Pyelonephritis versus perinephric abscess.     TECHNIQUE:  CT includes axial imaging from the lung bases to the  trochanters without intravenous contrast and without use of oral  contrast. Data reconstructed in coronal and sagittal planes. Radiation  dose reduction techniques were utilized, including automated exposure  control and exposure modulation based on body size.     COMPARISON: CT abdomen and pelvis 12/20/2023.     FINDINGS: There is mild linear subsegmental basal atelectasis. Liver,  spleen, pancreas appear grossly normal allowing for limitation without  IV contrast and due to streak artifact from patient being scanned with  his arms to his side. There is mild bilateral adrenal thickening,  greater on the left without change.     A right lower pole renal exophytic low-density lesion is most likely a  cyst and measures 5.6 cm and this was also present on the previous exam  12/20/2023. There is very mild hydronephrosis that is greater on the  right and new when compared to the prior exam. Mild hydroureter is  present to the level of the urinary bladder. There is general bladder  distention with generalized urinary bladder wall thickening consistent  with cystitis. [place] anterior bladder dome diverticulum is present  just posterior to the level of umbilicus. Pericystic stranding  is  present.     There is no bowel dilatation or evidence for bowel obstruction. Small  periumbilical hernia contains fat.     There is syndesmophyte formation within the lower thoracic spine and  lumbar spine with bony fusion of the vertebral bodies. There is also  bony fusion of the spinous processes, facet joints and there is  ankylosis of the sacroiliac joints.     Left total hip arthroplasty is present associated with beam-hardening  artifact. There is mild generalized body wall edema.        Impression:      1. Generalized urinary bladder wall distention with wall thickening  consistent with cystitis and urinary retention. Anterior bladder dome  diverticulum. Mild hydronephrosis, greater on the right, potential  pyelonephritis.  2. 5.6 cm right lower pole exophytic low-density lesion is consistent  with a cyst and is without change.  3. Mild generalized body wall edema.  4. Small periumbilical hernia contains fat.  5. Osseous findings suggest ankylosing spondylitis.     Radiation dose reduction techniques were utilized, including automated  exposure control and exposure modulation based on body size.        This report was finalized on 1/23/2024 10:02 AM by Dr. Bobby Eason M.D on Workstation: [id number]                Assessment:     Urinary retention  UTI  BPH  MARTITA    Plan:   - No acute urologic surgical intervention recommended.  - Continue Tamsulosin  - Recommend indwelling forde catheter  - Will continue to monitor Cr  - Continue antibiotics  - RASHEED in 2-3 to reassess hydronephrosis  - Urology will continue to follow

## 2024-01-23 NOTE — PAYOR COMM NOTE
"Anthony Gallegos (78 y.o. Male)     PLEASE SEE ATTACHED FOR INPT AUTH     PT WAS OBSERVATION 1/21  CHANGED TO INPT ON 1/22    REF #   [id number]    PLEASE CALL CATHIE DONALD RN/ DEPT @ [phone]  OR FAX  DEPARTMENT @  [phone]    THANK YOU   CATHIE DONALD RN  [facility]          Date of Birth   1945    Social Security Number       Address   [address] [place] [id number]    Home Phone   [phone]    MRN   [id number]       [facility]    Marital Status                               Admission Date   1/21/24 OBSERVATION  1/22 INPT     Admission Type   Emergency    Admitting Provider   Casie Reardon MD    Attending Provider   Shola Haque MD    Department, Room/Bed   [address], [address]       Discharge Date       Discharge Disposition       Discharge Destination                                 Attending Provider: Shola Haque MD    Allergies: No Known Allergies    Isolation: None   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)   Code Status: CPR    Ht: 190.5 cm (75\")   Wt: 73.2 kg (161 lb 6 oz)    Admission Cmt: None   Principal Problem: Transient alteration of awareness [R40.4]                   Active Insurance as of 1/21/2024       Primary Coverage       Payor Plan Insurance Group Employer/Plan Group    AETNA MEDICARE REPLACEMENT AETNA MED ADV POS [id number]       Payor Plan Address Payor Plan Phone Number Payor Plan Fax Number Effective Dates    [address] [phone]  12/1/2023 - None Entered    [facility] [id number]         Subscriber Name Subscriber Birth Date Member ID       ANTHONY GALLEGOS 1945 [id number]                     Emergency Contacts        (Rel.) Home Phone Work Phone Mobile Phone    NATHALIE DON (Daughter) [phone] -- --    MAGY GALLEGOS [phone] -- --              [place]: NPI [id number]  Tax ID [id number]     History & Physical        Casie Reardon, " MD at 23          HISTORY AND PHYSICAL   [facility]        Date of Admission: 2024  Patient Identification:  Name: Anthony Gallegos  Age: 78 y.o.  Sex: male  :  1945  MRN: [id number]                     Primary Care Physician: Provider, No Known    Chief Complaint:  78 year old gentleman who presented to the emergency room with altered mental status which started earlier today;he was sent in from a nursing home; he denies pain presently but family says he has had some pain with urination and incontinence today; no fever or chills were reported; the patient mumbles occasionally in response to questions but cannot give a detailed ros; a family member is present to help    History of Present Illness:   As above    Past Medical History:  Past Medical History:   Diagnosis Date    Abscess of scrotum     Arthritis     AS (ankylosing spondylitis)     Hypertension     Tetrahydrocannabinol (THC) use disorder, mild, abuse 2023    Uveitis      Past Surgical History:  Past Surgical History:   Procedure Laterality Date    COLONOSCOPY      ROTATOR CUFF REPAIR Right     TOTAL HIP ARTHROPLASTY Left       Home Meds:  (Not in a hospital admission)      Allergies:  No Known Allergies  Immunizations:  Immunization History   Administered Date(s) Administered    COVID-19 (MODERNA) BIVALENT 12+YRS 2022    COVID-19 (MODERNA) Monovalent Original Booster 2021    COVID-19 (PFIZER) Purple Cap Monovalent 2021, 2021    Fluad Quad 65+ 10/07/2023    Pneumococcal Conjugate 13-Valent (PCV13) 2019    Pneumococcal Polysaccharide (PPSV23) 2020    Shingrix 2018    Tdap 2009, 2015, 2020     Social History:   Social History     Social History Narrative    Not on file     Social History     Socioeconomic History    Marital status:     Number of children: 2   Tobacco Use    Smoking status: Never    Smokeless tobacco: Never   Vaping Use    Vaping  "Use: Never used   Substance and Sexual Activity    Alcohol use: No    Drug use: No    Sexual activity: Yes       Family History:  Family History   Problem Relation Age of Onset    Alzheimer's disease Mother         Review of Systems  Not obtainable from the patient    Objective:  T Max 24 hrs: Temp (24hrs), Av.4 °F (36.3 °C), Min:97.4 °F (36.3 °C), Max:97.4 °F (36.3 °C)    Vitals Ranges:   Temp:  [97.4 °F (36.3 °C)] 97.4 °F (36.3 °C)  Heart Rate:  [77-88] 87  Resp:  [16-17] 17  BP: (116-140)/(73-99) 132/99      Exam:  /99 (BP Location: Right arm, Patient Position: Sitting)   Pulse 87   Temp 97.4 °F (36.3 °C) (Oral)   Resp 17   Ht 190.5 cm (75\")   Wt 81.6 kg (180 lb)   SpO2 (!) 8%   BMI 22.50 kg/m²     General Appearance:    drowsy cooperative, no distress, appears stated age; chronically ill appearing   Head:    Normocephalic, without obvious abnormality, atraumatic   Eyes:    PERRL, conjunctivae/corneas clear, EOM's intact, both eyes   Ears:    Normal external ear canals, both ears   Nose:   Nares normal, septum midline, mucosa normal, no drainage    or sinus tenderness   Throat:   Lips, mucosa, and tongue normal   Neck:   Supple, symmetrical, trachea midline, no adenopathy;     thyroid:  no enlargement/tenderness/nodules; no carotid    bruit or JVD   Back:     Symmetric, no curvature, ROM normal, no CVA tenderness   Lungs:     Decreased breath sounds bilaterally, respirations unlabored   Chest Wall:    No tenderness or deformity    Heart:    Regular rate and rhythm, S1 and S2 normal, no murmur, rub   or gallop   Abdomen:     Soft, nontender, bowel sounds active all four quadrants,     no masses, no hepatomegaly, no splenomegaly   Extremities:   Extremities normal, atraumatic, no cyanosis or edema                       .    Data Review:  Labs in chart were reviewed.  WBC   Date Value Ref Range Status   2024 17.86 (H) 3.40 - 10.80 10*3/mm3 Final     Hemoglobin   Date Value Ref Range Status "   01/21/2024 12.3 (L) 13.0 - 17.7 g/dL Final     Hematocrit   Date Value Ref Range Status   01/21/2024 38.3 37.5 - 51.0 % Final     Platelets   Date Value Ref Range Status   01/21/2024 372 140 - 450 10*3/mm3 Final     Sodium   Date Value Ref Range Status   01/21/2024 139 136 - 145 mmol/L Final     Potassium   Date Value Ref Range Status   01/21/2024 4.7 3.5 - 5.2 mmol/L Final     Comment:     Slight hemolysis detected by analyzer. Result may be falsely elevated.     Chloride   Date Value Ref Range Status   01/21/2024 100 98 - 107 mmol/L Final     CO2   Date Value Ref Range Status   01/21/2024 26.0 22.0 - 29.0 mmol/L Final     BUN   Date Value Ref Range Status   01/21/2024 27 (H) 8 - 23 mg/dL Final     Creatinine   Date Value Ref Range Status   01/21/2024 1.79 (H) 0.76 - 1.27 mg/dL Final     Glucose   Date Value Ref Range Status   01/21/2024 123 (H) 65 - 99 mg/dL Final     Calcium   Date Value Ref Range Status   01/21/2024 9.5 8.6 - 10.5 mg/dL Final                Imaging Results (All)       Procedure Component Value Units Date/Time    XR Chest 1 View [[id number]] Collected: 01/21/24 1859     Updated: 01/21/24 1902    Narrative:      PORTABLE CHEST X-RAY     HISTORY: Altered mental status.     Portable chest x-ray is provided. Correlation: December 20, 2023 and  June 21, 2017.     FINDINGS: The cardiomediastinal silhouette is normal. The lungs are  clear. The costophrenic sulci are dry and the bones appear normal. There  is no pneumothorax.       Impression:      Negative.     This report was finalized on 1/21/2024 6:59 PM by Dr. Walter Henderson M.D on Workstation: [id number]       CT Head Without Contrast [[id number]] Collected: 01/21/24 1853     Updated: 01/21/24 1859    Narrative:      HEAD CT     HISTORY: Confusion.     TECHNIQUE: Noncontrast head CT is provided. Comparison: Head CT December 20, 2023.     FINDINGS: The ventricles are normal in caliber. There is loss of  cerebral parenchymal volume with patchy low  density in the frontal white  matter similar as observed previously. No new parenchymal abnormality is  present. There is no hemorrhage or acute ischemic change. The bones of  the skull appear normal. The mastoid air cells, middle ears, and  visualized paranasal sinuses are clear. Extensive atheromatous vascular  calcification       Impression:      Chronic changes in the brain. No acute abnormality.        Radiation dose reduction techniques were utilized, including automated  exposure control and exposure modulation based on body size.        This report was finalized on 1/21/2024 6:56 PM by Dr. Walter Henderson M.D on Workstation: [id number]                 Assessment:  Active Hospital Problems    Diagnosis  POA    **Altered mental status [R41.82]  Yes      Resolved Hospital Problems   No resolved problems to display.   Uti  Hypertension  Dish  Underweight  Hyperglycemia  Anemia  Acute kidney injury    Plan:  Will continue fluids  Antibiotics  Monitor on telemery  Trend vincent  Dw patient, daughter and ed provider    Casie Reardon MD  1/21/2024  20:52 EST      Electronically signed by Casie Reardon MD at 01/21/24 2057          Emergency Department Notes        Cindy West, RN at 01/21/24 2152          Nursing report ED to floor  Anthony Gallegos  78 y.o.  male    HPI :   Chief Complaint   Patient presents with    Dysuria    Altered Mental Status       Admitting doctor:   Casie Reardon MD    Admitting diagnosis:   The primary encounter diagnosis was Altered mental status, unspecified altered mental status type. A diagnosis of Leukocytosis, unspecified type was also pertinent to this visit.    Code status:   Current Code Status       Date Active Code Status Order ID Comments User Context       1/21/2024 2052 CPR (Attempt to Resuscitate) [id number]  Casie Reardon MD ED        Question Answer    Code Status (Patient has no pulse and is not breathing) CPR (Attempt to Resuscitate)     Medical Interventions (Patient has pulse or is breathing) Full                    Allergies:   Patient has no known allergies.    Isolation:   No active isolations    Intake and Output  No intake or output data in the 24 hours ending 01/21/24 2152    Weight:       01/21/24  1744   Weight: 81.6 kg (180 lb)       Most recent vitals:   Vitals:    01/21/24 1800 01/21/24 1804 01/1945 01/21/24 2113   BP: 140/73  132/99 114/67   BP Location:   Right arm Right arm   Patient Position:   Sitting Lying   Pulse:  88 87 84   Resp:   17 16   Temp:       TempSrc:       SpO2:   (!) 8% 100%   Weight:       Height:           Active LDAs/IV Access:   Lines, Drains & Airways       Active LDAs       Name Placement date Placement time Site Days    Peripheral IV 01/21/24 1844 Anterior;Left;Proximal Forearm 01/21/24 1844  Forearm  less than 1                    Labs (abnormal labs have a star):   Labs Reviewed   COMPREHENSIVE METABOLIC PANEL - Abnormal; Notable for the following components:       Result Value    Glucose 123 (*)     BUN 27 (*)     Creatinine 1.79 (*)     Albumin 3.3 (*)     AST (SGOT) 47 (*)     eGFR 38.1 (*)     All other components within normal limits    Narrative:     GFR Normal >60  Chronic Kidney Disease <60  Kidney Failure <15    The GFR formula is only valid for adults with stable renal function between ages 18 and 70.   CBC WITH AUTO DIFFERENTIAL - Abnormal; Notable for the following components:    WBC 17.86 (*)     Hemoglobin 12.3 (*)     All other components within normal limits   MANUAL DIFFERENTIAL - Abnormal; Notable for the following components:    Neutrophil % 90.0 (*)     Lymphocyte % 5.0 (*)     Neutrophils Absolute 16.07 (*)     All other components within normal limits   BLOOD CULTURE   BLOOD CULTURE   LACTIC ACID, PLASMA   URINALYSIS W/ CULTURE IF INDICATED   CBC AND DIFFERENTIAL    Narrative:     The following orders were created for panel order CBC & Differential.  Procedure                                Abnormality         Status                     ---------                               -----------         ------                     CBC Auto Differential[[id number]]        Abnormal            Final result                 Please view results for these tests on the individual orders.       EKG:   No orders to display       Meds given in ED:   Medications   sodium chloride 0.9 % flush 10 mL (has no administration in time range)   cefTRIAXone (ROCEPHIN) 2,000 mg in sodium chloride 0.9 % 100 mL IVPB-VTB (has no administration in time range)   acetaminophen (TYLENOL) tablet 650 mg (has no administration in time range)   sennosides-docusate (PERICOLACE) 8.6-50 MG per tablet 2 tablet (has no administration in time range)     And   polyethylene glycol (MIRALAX) packet 17 g (has no administration in time range)     And   bisacodyl (DULCOLAX) EC tablet 5 mg (has no administration in time range)     And   bisacodyl (DULCOLAX) suppository 10 mg (has no administration in time range)   ondansetron ODT (ZOFRAN-ODT) disintegrating tablet 4 mg (has no administration in time range)     Or   ondansetron (ZOFRAN) injection 4 mg (has no administration in time range)   melatonin tablet 3 mg (has no administration in time range)   Pharmacy to Dose enoxaparin (LOVENOX) (has no administration in time range)   sodium chloride 0.9 % infusion (has no administration in time range)   cefTRIAXone (ROCEPHIN) 2,000 mg in sodium chloride 0.9 % 100 mL IVPB-VTB (has no administration in time range)   sodium chloride 0.9 % bolus 500 mL (0 mL Intravenous Stopped 1/21/24 2112)       Imaging results:  XR Chest 1 View    Result Date: 1/21/2024  Negative.  This report was finalized on 1/21/2024 6:59 PM by Dr. Walter Henderson M.D on Workstation: [id number]      CT Head Without Contrast    Result Date: 1/21/2024  Chronic changes in the brain. No acute abnormality.   Radiation dose reduction techniques were utilized, including automated exposure  control and exposure modulation based on body size.   This report was finalized on 1/21/2024 6:56 PM by Dr. Walter Henderson M.D on Workstation: [id number]       Ambulatory status:   -     Social issues:   Social History     Socioeconomic History    Marital status:     Number of children: 2   Tobacco Use    Smoking status: Never    Smokeless tobacco: Never   Vaping Use    Vaping Use: Never used   Substance and Sexual Activity    Alcohol use: No    Drug use: No    Sexual activity: Yes       NIH Stroke Scale:       Cindy West RN  01/21/24 21:52 EST         Electronically signed by Cindy West RN at 01/21/24 2152       Juan Crowley MD at 01/21/24 2012           EMERGENCY DEPARTMENT ENCOUNTER  Room Number:  02/02  PCP: Provider, No Known  Independent Historians: Patient and Family      HPI:  Chief Complaint: had concerns including Dysuria and Altered Mental Status.     A complete HPI/ROS/PMH/PSH/SH/FH are unobtainable due to: Mental status    Chronic or social conditions impacting patient care (Social Determinants of Health): None      Context: Anthony Gallegos is a 78 y.o. male with a medical history of ankylosing pyelitis, hypertension who presents to the ED c/o acute altered mental status.  Family reports that beginning today he developed altered mental status.  He lives at the nursing home.  He is usually alert and oriented x 4.  EMS found him alert and oriented x 3.  Daughter reports that today he has had dysuria with cloudy urine.  He has been generally weak.  He has not had any falls or known injury.      Review of prior external notes (non-ED) -and- Review of prior external test results outside of this encounter:  Laboratory evaluation 1/6/2024 shows normal white blood cell count, normal renal function    Prescription drug monitoring program review:         PAST MEDICAL HISTORY  Active Ambulatory Problems     Diagnosis Date Noted    Ankylosing spondylitis of cervical region 06/29/2017     Recent unexplained weight loss 07/14/2017    Abnormal serum lipase level 08/10/2017    Abnormal serum level of amylase 08/10/2017    Hypertension 10/19/2017    Boil 03/22/2018    Immobility 12/20/2023    Fall from bed 12/20/2023    Tetrahydrocannabinol (THC) use disorder, mild, abuse 12/20/2023    Generalized pain 12/20/2023     12/20/2023    Grief 12/20/2023    DISH (disseminated idiopathic skeletal hyperostosis) 12/20/2023    Generalized weakness 12/21/2023    Severe malnutrition 12/24/2023     Resolved Ambulatory Problems     Diagnosis Date Noted    Urine retention 12/20/2023    Constipation 12/20/2023     Past Medical History:   Diagnosis Date    Abscess of scrotum     Arthritis     AS (ankylosing spondylitis)     Uveitis          PAST SURGICAL HISTORY  Past Surgical History:   Procedure Laterality Date    COLONOSCOPY      ROTATOR CUFF REPAIR Right     TOTAL HIP ARTHROPLASTY Left 2014         FAMILY HISTORY  Family History   Problem Relation Age of Onset    Alzheimer's disease Mother          SOCIAL HISTORY  Social History     Socioeconomic History    Marital status:     Number of children: 2   Tobacco Use    Smoking status: Never    Smokeless tobacco: Never   Vaping Use    Vaping Use: Never used   Substance and Sexual Activity    Alcohol use: No    Drug use: No    Sexual activity: Yes         ALLERGIES  Patient has no known allergies.      REVIEW OF SYSTEMS  Review of Systems  Included in HPI  All systems reviewed and negative except for those discussed in HPI.      PHYSICAL EXAM    I have reviewed the triage vital signs and nursing notes.    ED Triage Vitals   Temp Heart Rate Resp BP SpO2   01/21/24 1744 01/21/24 1744 01/21/24 1744 01/21/24 1744 01/21/24 1744   97.4 °F (36.3 °C) 77 16 116/89 100 %      Temp src Heart Rate Source Patient Position BP Location FiO2 (%)   01/21/24 1744 -- [birth year] --   Oral  Sitting Right arm        Physical Exam  GENERAL: Awake, alert,  confused, opens eyes to voice  SKIN: Warm, dry  HENT: Normocephalic, atraumatic  EYES: no scleral icterus  CV: regular rhythm, regular rate  RESPIRATORY: normal effort, lungs clear  ABDOMEN: soft, nontender, nondistended  MUSCULOSKELETAL: no deformity  NEURO: alert, moves all extremities, follows commands      NIH:                                                             LAB RESULTS  Recent Results (from the past 24 hour(s))   Comprehensive Metabolic Panel    Collection Time: 01/21/24  6:52 PM    Specimen: Blood   Result Value Ref Range    Glucose 123 (H) 65 - 99 mg/dL    BUN 27 (H) 8 - 23 mg/dL    Creatinine 1.79 (H) 0.76 - 1.27 mg/dL    Sodium 139 136 - 145 mmol/L    Potassium 4.7 3.5 - 5.2 mmol/L    Chloride 100 98 - 107 mmol/L    CO2 26.0 22.0 - 29.0 mmol/L    Calcium 9.5 8.6 - 10.5 mg/dL    Total Protein 7.9 6.0 - 8.5 g/dL    Albumin 3.3 (L) 3.5 - 5.2 g/dL    ALT (SGPT) 22 1 - 41 U/L    AST (SGOT) 47 (H) 1 - 40 U/L    Alkaline Phosphatase 80 39 - 117 U/L    Total Bilirubin 0.5 0.0 - 1.2 mg/dL    Globulin 4.6 gm/dL    A/G Ratio 0.7 g/dL    BUN/Creatinine Ratio 15.1 7.0 - 25.0    Anion Gap 13.0 5.0 - 15.0 mmol/L    eGFR 38.1 (L) >60.0 mL/min/1.73   CBC Auto Differential    Collection Time: 01/21/24  6:52 PM    Specimen: Blood   Result Value Ref Range    WBC 17.86 (H) 3.40 - 10.80 10*3/mm3    RBC 4.16 4.14 - 5.80 10*6/mm3    Hemoglobin 12.3 (L) 13.0 - 17.7 g/dL    Hematocrit 38.3 37.5 - 51.0 %    MCV 92.1 79.0 - 97.0 fL    MCH 29.6 26.6 - 33.0 pg    MCHC 32.1 31.5 - 35.7 g/dL    RDW 13.0 12.3 - 15.4 %    RDW-SD 42.9 37.0 - 54.0 fl    MPV 9.2 6.0 - 12.0 fL    Platelets 372 140 - 450 10*3/mm3   Manual Differential    Collection Time: 01/21/24  6:52 PM    Specimen: Blood   Result Value Ref Range    Neutrophil % 90.0 (H) 42.7 - 76.0 %    Lymphocyte % 5.0 (L) 19.6 - 45.3 %    Monocyte % 5.0 5.0 - 12.0 %    Neutrophils Absolute 16.07 (H) 1.70 - 7.00 10*3/mm3    Lymphocytes Absolute 0.89 0.70 - 3.10 10*3/mm3     Monocytes Absolute 0.89 0.10 - 0.90 10*3/mm3    RBC Morphology Normal Normal    WBC Morphology Normal Normal    Platelet Morphology Normal Normal         RADIOLOGY  XR Chest 1 View    Result Date: 1/21/2024  PORTABLE CHEST X-RAY  HISTORY: Altered mental status.  Portable chest x-ray is provided. Correlation: December 20, 2023 and June 21, 2017.  FINDINGS: The cardiomediastinal silhouette is normal. The lungs are clear. The costophrenic sulci are dry and the bones appear normal. There is no pneumothorax.      Negative.  This report was finalized on 1/21/2024 6:59 PM by Dr. Walter Henderson M.D on Workstation: [id number]      CT Head Without Contrast    Result Date: 1/21/2024  HEAD CT  HISTORY: Confusion.  TECHNIQUE: Noncontrast head CT is provided. Comparison: Head CT December 20, 2023.  FINDINGS: The ventricles are normal in caliber. There is loss of cerebral parenchymal volume with patchy low density in the frontal white matter similar as observed previously. No new parenchymal abnormality is present. There is no hemorrhage or acute ischemic change. The bones of the skull appear normal. The mastoid air cells, middle ears, and visualized paranasal sinuses are clear. Extensive atheromatous vascular calcification      Chronic changes in the brain. No acute abnormality.   Radiation dose reduction techniques were utilized, including automated exposure control and exposure modulation based on body size.   This report was finalized on 1/21/2024 6:56 PM by Dr. Walter Henderson M.D on Workstation: [id number]         MEDICATIONS GIVEN IN ER  Medications   sodium chloride 0.9 % flush 10 mL (has no administration in time range)   cefTRIAXone (ROCEPHIN) 2,000 mg in sodium chloride 0.9 % 100 mL IVPB-VTB (has no administration in time range)   acetaminophen (TYLENOL) tablet 650 mg (has no administration in time range)   sennosides-docusate (PERICOLACE) 8.6-50 MG per tablet 2 tablet (has no administration in time range)     And    polyethylene glycol (MIRALAX) packet 17 g (has no administration in time range)     And   bisacodyl (DULCOLAX) EC tablet 5 mg (has no administration in time range)     And   bisacodyl (DULCOLAX) suppository 10 mg (has no administration in time range)   ondansetron ODT (ZOFRAN-ODT) disintegrating tablet 4 mg (has no administration in time range)     Or   ondansetron (ZOFRAN) injection 4 mg (has no administration in time range)   melatonin tablet 3 mg (has no administration in time range)   Pharmacy to Dose enoxaparin (LOVENOX) (has no administration in time range)   sodium chloride 0.9 % infusion (has no administration in time range)   cefTRIAXone (ROCEPHIN) 2,000 mg in sodium chloride 0.9 % 100 mL IVPB-VTB (has no administration in time range)   sodium chloride 0.9 % bolus 500 mL (0 mL Intravenous Stopped 1/21/24 2112)         ORDERS PLACED DURING THIS VISIT:  Orders Placed This Encounter   Procedures    Blood Culture - Blood,    Blood Culture - Blood,    XR Chest 1 View    CT Head Without Contrast    Comprehensive Metabolic Panel    CBC Auto Differential    Manual Differential    Lactic Acid, Plasma    Urinalysis With Culture If Indicated - Urine, Catheter    Basic Metabolic Panel    CBC (No Diff)    Diet: Cardiac Diets; Healthy Heart (2-3 Na+); Texture: Regular Texture (IDDSI 7); Fluid Consistency: Thin (IDDSI 0)    Vital Signs    Up with assistance    Daily Weights    Strict Intake & Output    Oral Care    Code Status and Medical Interventions:    Insert Peripheral IV    Initiate Observation Status    CBC & Differential         OUTPATIENT MEDICATION MANAGEMENT:  Current Facility-Administered Medications Ordered in Epic   Medication Dose Route Frequency Provider Last Rate Last Admin    acetaminophen (TYLENOL) tablet 650 mg  650 mg Oral Q4H PRN Casie Reardon MD        sennosides-docusate (PERICOLACE) 8.6-50 MG per tablet 2 tablet  2 tablet Oral BID Casie Reardon MD        And    polyethylene  glycol (MIRALAX) packet 17 g  17 g Oral Daily PRN Stinglori, Casie Araujo MD        And    bisacodyl (DULCOLAX) EC tablet 5 mg  5 mg Oral Daily PRN Casie Reardon MD        And    bisacodyl (DULCOLAX) suppository 10 mg  10 mg Rectal Daily PRN Casie Reardon MD        cefTRIAXone (ROCEPHIN) 2,000 mg in sodium chloride 0.9 % 100 mL IVPB-VTB  2,000 mg Intravenous Once CrowleyJuan MD        cefTRIAXone (ROCEPHIN) 2,000 mg in sodium chloride 0.9 % 100 mL IVPB-VTB  2,000 mg Intravenous Q24H StingCasie sandoval MD        melatonin tablet 3 mg  3 mg Oral Nightly PRN Casie Reardon MD        ondansetron ODT (ZOFRAN-ODT) disintegrating tablet 4 mg  4 mg Oral Q6H PRN Casie Reardon MD        Or    ondansetron (ZOFRAN) injection 4 mg  4 mg Intravenous Q6H PRN Casie Reardon MD        Pharmacy to Dose enoxaparin (LOVENOX)   Does not apply Continuous PRN Alexys, Casie Araujo MD        sodium chloride 0.9 % flush 10 mL  10 mL Intravenous PRN Jeffrey Jean PA        sodium chloride 0.9 % infusion  100 mL/hr Intravenous Continuous Casie Reardon MD         Current Outpatient Medications Ordered in Epic   Medication Sig Dispense Refill    atenolol (TENORMIN) 50 MG tablet Take 1 tablet by mouth Daily. 30 tablet 0    esomeprazole (nexIUM) 20 MG capsule Take 1 capsule by mouth Daily As Needed (asneeded).      Lidocaine 4 % Place 2 patches on the skin as directed by provider Daily. Remove & Discard patch within 12 hours or as directed by MD 30 each 1    methotrexate 2.5 MG tablet Take 1 tablet by mouth 2 (Two) Times a Week. Pt takes every Wednesday and thursday  5    muscle rub (BenGay) 10-15 % cream cream Apply  topically to the appropriate area as directed 4 (Four) Times a Day As Needed (sore muscles). 85 g 0    Naproxen Sodium 220 MG capsule Take 220 mg of amoxicillin by mouth Daily.      Omega-3 Fatty Acids (OMEGA 3 PO) Take 1 capsule by mouth Daily.      pantoprazole  (PROTONIX) 40 MG EC tablet Take 1 tablet by mouth Every Morning.      tamsulosin (FLOMAX) 0.4 MG capsule 24 hr capsule Take 1 capsule by mouth Every Night. 30 capsule 1         PROCEDURES  Procedures            PROGRESS, DATA ANALYSIS, CONSULTS, AND MEDICAL DECISION MAKING  All labs have been independently interpreted by me.  All radiology studies have been reviewed by me. All EKG's have been independently viewed and interpreted by me.  Discussion below represents my analysis of pertinent findings related to patient's condition, differential diagnosis, treatment plan and final disposition.    Differential diagnosis includes but is not limited to intracranial hemorrhage, UTI, sepsis, pneumonia.    Clinical Scores:                  ED Course as of 01/21/24 2115   Sun Jan 21, 2024 2004 CT Head Without Contrast  My independent interpretation of the CT of the head is no hemorrhage [TR]   2035 Discussing with Dr. Reardon with [facility].  She agrees to admit.  The patient has a leukocytosis, altered mental status, and urinary incontinence.  I suspect UTI.  Starting IV antibiotics.  Head CT and chest x-ray are clear. [TR]   2115 I reviewed the workup and findings with the patient and family at the bedside.  Answered all questions.  Plan admission.  They are agreeable. [TR]      ED Course User Index  [TR] Juan Crowley MD             AS OF 21:15 EST VITALS:    BP - 114/67  HR - 84  TEMP - 97.4 °F (36.3 °C) (Oral)  O2 SATS - 100%    COMPLEXITY OF CARE  The patient requires admission.      DIAGNOSIS  Final diagnoses:   Altered mental status, unspecified altered mental status type   Leukocytosis, unspecified type         DISPOSITION  ED Disposition       ED Disposition   Decision to Admit    Condition   --    Comment   Level of Care: Telemetry [5]   Diagnosis: Altered mental status [780.97.ICD-9-CM]   Admitting Physician: JOHN REARDON [[id number]]   Attending Physician: JOHN REARDON [[id number]]                  Please note  that portions of this document were completed with a voice recognition program.    Note Disclaimer: At [facility], we believe that sharing information builds trust and better relationships. You are receiving this note because you recently visited [facility]. It is possible you will see health information before a provider has talked with you about it. This kind of information can be easy to misunderstand. To help you fully understand what it means for your health, we urge you to discuss this note with your provider.         Juan Crowley MD  24      Electronically signed by Juan Crowley MD at 24       Lynette Fleming RN at 24          Pt to ED from [facility] via EMS. Pt family called EMS due to patient having dark urine and being altered. Pt is normally Aox4, is currently Aox3, disoriented to time. Pt denies urinary changes, pt denies all pain or any other symptoms. Family reported to EMS that patient can ambulate independently but due to chronic back pain can sometimes need assistance.     Electronically signed by Lynette Fleming RN at 24 1752          Physician Progress Notes (last 72 hours)        Gian Chung DO at 24 0942              Name: Anthony Gallegos ADMIT: 2024   : 1945  PCP: Provider, No Known    MRN: [id number] LOS: 0 days   AGE/SEX: 78 y.o. male  ROOM: [facility]     Subjective   Subjective   Patient seen and examined this morning. Hospital day 1. At time of my examination, patient is awake, alert resting in bed. Remains slightly confused, oriented x2 (not location), has some dysuria, no acute complaints otherwise at present. No acute events overnight.       Objective   Objective   Vital Signs  Temp:  [97.4 °F (36.3 °C)-99.3 °F (37.4 °C)] 98.2 °F (36.8 °C)  Heart Rate:  [77-94] 94  Resp:  [16-18] 18  BP: (114-153)/(65-99) 153/74  SpO2:  [8 %-100 %] 100 %  on   ;   Device (Oxygen Therapy): room air  Body mass index is  20.69 kg/m².  Physical Exam  Vitals and nursing note reviewed.   Constitutional:       General: He is awake. He is not in acute distress.     Comments: Thin/frail, chronically ill appearing   HENT:      Head: Atraumatic.   Eyes:      General: No scleral icterus.     Extraocular Movements: Extraocular movements intact.   Cardiovascular:      Rate and Rhythm: Normal rate and regular rhythm.      Pulses: Normal pulses.      Heart sounds: Normal heart sounds.   Pulmonary:      Effort: Pulmonary effort is normal. No respiratory distress.      Breath sounds: Normal breath sounds.   Abdominal:      General: Bowel sounds are normal.      Palpations: Abdomen is soft.      Tenderness: There is no abdominal tenderness.   Musculoskeletal:      Right lower leg: No edema.      Left lower leg: No edema.   Skin:     General: Skin is warm and dry.   Neurological:      General: No focal deficit present.      Mental Status: He is alert. He is confused.      Sensory: No sensory deficit.      Comments: Generally weak but no focal differences appreciated   Psychiatric:         Behavior: Behavior is cooperative.       Results Review     I reviewed the patient's new clinical results.  Results from last 7 days   Lab Units 01/22/24  0437 01/21/24  1852   WBC 10*3/mm3 15.81* 17.86*   HEMOGLOBIN g/dL 13.1 12.3*   PLATELETS 10*3/mm3 350 372     Results from last 7 days   Lab Units 01/22/24 0437 01/21/24  1852   SODIUM mmol/L 138 139   POTASSIUM mmol/L 4.5 4.7   CHLORIDE mmol/L 101 100   CO2 mmol/L 23.4 26.0   BUN mg/dL 30* 27*   CREATININE mg/dL 1.58* 1.79*   GLUCOSE mg/dL 108* 123*   EGFR mL/min/1.73 44.2* 38.1*     Results from last 7 days   Lab Units 01/21/24  1852   ALBUMIN g/dL 3.3*   BILIRUBIN mg/dL 0.5   ALK PHOS U/L 80   AST (SGOT) U/L 47*   ALT (SGPT) U/L 22     Results from last 7 days   Lab Units 01/22/24 0437 01/21/24  1852   CALCIUM mg/dL 8.8 9.5   ALBUMIN g/dL  --  3.3*     Results from last 7 days   Lab Units 01/21/24  1765  "  LACTATE mmol/L 1.8     No results found for: \"HGBA1C\", \"POCGLU\"    XR Chest 1 View    Result Date: 1/21/2024  Negative.  This report was finalized on 1/21/2024 6:59 PM by Dr. Walter Henderson M.D on Workstation: [id number]      CT Head Without Contrast    Result Date: 1/21/2024  Chronic changes in the brain. No acute abnormality.   Radiation dose reduction techniques were utilized, including automated exposure control and exposure modulation based on body size.   This report was finalized on 1/21/2024 6:56 PM by Dr. Walter Henderson M.D on Workstation: [id number]       I have personally reviewed all medications:  Scheduled Medications  atenolol, 50 mg, Oral, Q24H  cefTRIAXone, 2,000 mg, Intravenous, Q24H  enoxaparin, 40 mg, Subcutaneous, Q24H  pantoprazole, 40 mg, Oral, Q AM  senna-docusate sodium, 2 tablet, Oral, BID  tamsulosin, 0.4 mg, Oral, Nightly    Infusions  sodium chloride, 100 mL/hr, Last Rate: 100 mL/hr (01/22/24 0558)    Diet  Diet: Cardiac Diets; Healthy Heart (2-3 Na+); Texture: Regular Texture (IDDSI 7); Fluid Consistency: Thin (IDDSI 0)    I have personally reviewed:  [x]  Laboratory   [x]  Microbiology   [x]  Radiology   [x]  EKG/Telemetry  [x]  Cardiology/Vascular   []  Pathology    []  Records      Assessment/Plan     Active Hospital Problems    Diagnosis  POA    **Transient alteration of awareness [R40.4]  Yes    GERD without esophagitis [K21.9]  Yes    BPH (benign prostatic hyperplasia) [N40.0]  Yes    Leukocytosis [D72.829]  Yes    UTI (urinary tract infection) [N39.0]  Yes    Dehydration [E86.0]  Yes    MARTITA (acute kidney injury) [N17.9]  Yes    Hyperglycemia [R73.9]  Yes    Altered mental status [R41.82]  Yes    Generalized weakness [R53.1]  Yes    Hypertension [I10]  Yes      Resolved Hospital Problems   No resolved problems to display.       78 y.o. male admitted with Transient alteration of awareness.    Transient alteration of awareness  Generalized weakness  - Likely multifactorial in the " setting of acute infection, dehydration, acute kidney injury. CT head negative for acute findings, CXR negative. No evidence of acute cardiac or respiratory distress, mental status appears to be improving from admission with current treatments.  - Treatment for contributing etiologies as discussed elsewhere.  - PT/OT, delirium/aspiration/fall precautions, telemetry, pulse oximetry.    Urinary tract infection  Leukocytosis  - Patient presenting with symptoms of dysuria coupled with UA findings showing 3+ LE, + nitrite, unable to determine WBC or bacteria due to loaded field.  - Currently on Ceftriaxone, tolerating well. WBC count improving, patient afebrile.  - Continue treatments as prescribed.  - Follow up blood and urine cultures.  - Acute urinary retention protocol.    Dehydration  Acute kidney injury  - Etiology most likely pre-renal in nature, due to volume depletion from decreased oral intake.  - Creatinine on admission: 1.79. Baseline creatinine appears to be ~0.7 to 0.8 on average.  - Continue IVF for volume resuscitation and closely monitor. Order repeat BMP in AM for reassessment. Avoid nephrotoxic medications  - Will consider renal ultrasound and nephrology consult based on clinical course.    Hyperglycemia  - Glucose elevated on most recent labs. Patient without known history of T2DM.  No prior A1c available for review.  - Order repeat A1c at this time.  - Monitor for now, can add correctional SSI if needed.    Hypertension  - Blood pressure appears stable and acceptable acutely at this time. No indications to warrant acute changes/intervention at this time.  - Continue current medications as prescribed. Trend BP to guide ongoing management decisions.    BPH  - Continue Tamsulosin as prescribed.    GERD  - Continue PPI as prescribed.       Lovenox 40 mg SC daily for DVT prophylaxis.  Full code.  Discussed with patient and nursing staff.  Anticipate discharge  TBD  timing yet to be determined.      Gian  DO Juliet  [facility]  01/22/24          Electronically signed by Gian Chung DO at 01/22/24 0996

## 2024-01-23 NOTE — THERAPY TREATMENT NOTE
Patient Name: Anthony Gallegos  : 1945    MRN: [id number]                              Today's Date: 2024       Admit Date: 2024    Visit Dx:     ICD-10-CM ICD-9-CM   1. Altered mental status, unspecified altered mental status type  R41.82 780.97   2. Leukocytosis, unspecified type  D72.829 288.60     Patient Active Problem List   Diagnosis    Ankylosing spondylitis of cervical region    Recent unexplained weight loss    Abnormal serum lipase level    Abnormal serum level of amylase    Hypertension    Boil    Immobility    Fall from bed    Tetrahydrocannabinol (THC) use disorder, mild, abuse    Generalized pain        Grief    DISH (disseminated idiopathic skeletal hyperostosis)    Generalized weakness    Severe malnutrition    Altered mental status    Transient alteration of awareness    GERD without esophagitis    BPH (benign prostatic hyperplasia)    Leukocytosis    UTI (urinary tract infection)    Dehydration    MARTITA (acute kidney injury)    Hyperglycemia    Altered mental state     Past Medical History:   Diagnosis Date    Abscess of scrotum     Arthritis     AS (ankylosing spondylitis)     Hypertension     Tetrahydrocannabinol (THC) use disorder, mild, abuse 2023    Uveitis      Past Surgical History:   Procedure Laterality Date    COLONOSCOPY      ROTATOR CUFF REPAIR Right     TOTAL HIP ARTHROPLASTY Left       General Information       Row Name 24 1733          Physical Therapy Time and Intention    Document Type therapy note (daily note)  -     Mode of Treatment individual therapy;physical therapy  -       Row Name 24 1733          General Information    Patient Profile Reviewed yes  -     Existing Precautions/Restrictions fall  -       Row Name 24 1735          Cognition    Orientation Status (Cognition) oriented x 3  needs extra time to respond  -       Row Name 24 1734          Safety Issues, Functional Mobility    Safety Issues Affecting  "Function (Mobility) judgment;problem-solving;safety precaution awareness  -     Impairments Affecting Function (Mobility) balance;coordination;endurance/activity tolerance;strength;postural/trunk control  -               User Key  (r) = Recorded By, (t) = Taken By, (c) = Cosigned By      Initials Name Provider Type    Rosa Asencio PTA Physical Therapist Assistant                   Mobility       Row Name 01/23/24 1734          Bed Mobility    Supine-Sit Martin (Bed Mobility) 2 person assist;moderate assist (50% patient effort);verbal cues  -     Assistive Device (Bed Mobility) bed rails;head of bed elevated  -       Row Name 01/23/24 1734          Sit-Stand Transfer    Sit-Stand Martin (Transfers) maximum assist (25% patient effort);2 person assist;other (see comments)  plus 2 others , RN gathered enough help due to orthostatic issues today  -     Comment, (Sit-Stand Transfer) attempted but pt unable to come to fully stand with assist of 2-3 , pt with low BP sitting , but unable to get standing BP as pt was unable to alexei  -       Row Name 01/23/24 1734          Gait/Stairs (Locomotion)    Martin Level (Gait) unable to assess  -     Comment, (Gait/Stairs) unsafe to attempt amb, pt too weak this session  -               User Key  (r) = Recorded By, (t) = Taken By, (c) = Cosigned By      Initials Name Provider Type    Rosa Asencio PTA Physical Therapist Assistant                   Obj/Interventions       Row Name 01/23/24 1737          Motor Skills    Therapeutic Exercise --  APs, QS x10 reps , unable to alexei EOB exer  -               User Key  (r) = Recorded By, (t) = Taken By, (c) = Cosigned By      Initials Name Provider Type    Rosa Asencio PTA Physical Therapist Assistant                   Goals/Plan    No documentation.                  Clinical Impression       Row Name 01/23/24 1738          Pain    Pre/Posttreatment Pain Comment \"hurts all over, a lot\" " , pain to light touch on extremities  -     Pain Intervention(s) Repositioned  -       Row Name 01/23/24 1738          Plan of Care Review    Plan of Care Reviewed With patient  -     Progress declining  -     Outcome Evaluation Pt agreed to try PT session, but weak and sore upon entry, pt alexei sup/sit w/mod 2, but standing attempts req max/dep 2-3 and unable to achieve full standing, RN and Tiarra assisted as pt req orthostatic BP checks and incorporated into PT session with their help, unsafe EOB so returned to bed after standing attempt  -       Row Name 01/23/24 1738          Therapy Assessment/Plan (PT)    Rehab Potential (PT) fair, will monitor progress closely  -     Criteria for Skilled Interventions Met (PT) yes  -       Row Name 01/23/24 1738          Vital Signs    O2 Delivery Pre Treatment room air  -       Row Name 01/23/24 1738          Positioning and Restraints    Pre-Treatment Position in bed  -     Post Treatment Position bed  -     In Bed fowlers;call light within reach;encouraged to call for assist;exit alarm on;with nsg  -               User Key  (r) = Recorded By, (t) = Taken By, (c) = Cosigned By      Initials Name Provider Type    Rosa Asencio, TULIO Physical Therapist Assistant                   Outcome Measures       Row Name 01/23/24 1744 01/23/24 0853       How much help from another person do you currently need...    Turning from your back to your side while in flat bed without using bedrails? 2  - 3  -CT    Moving from lying on back to sitting on the side of a flat bed without bedrails? 2  - 2  -CT    Moving to and from a bed to a chair (including a wheelchair)? 1  - 2  -CT    Standing up from a chair using your arms (e.g., wheelchair, bedside chair)? 1  - 2  -CT    Climbing 3-5 steps with a railing? 1  - 1  -CT    To walk in hospital room? 1  - 2  -CT    AM-PAC 6 Clicks Score (PT) 8  - 12  -CT    Highest Level of Mobility Goal 3 --> Sit at edge of  bed  - 4 --> Transfer to chair/commode  -CT              User Key  (r) = Recorded By, (t) = Taken By, (c) = Cosigned By      Initials Name Provider Type    Rosa Asencio PTA Physical Therapist Assistant    CT Sergio, Jena Ro RN Registered Nurse                                 Physical Therapy Education       Title: PT OT SLP Therapies (Done)       Topic: Physical Therapy (Done)       Point: Mobility training (Done)       Learning Progress Summary             Patient Acceptance, E,D, VU,NR by  at 1/23/2024 1744    Acceptance, E, VU,NR by DB at 1/22/2024 1427                         Point: Home exercise program (Done)       Learning Progress Summary             Patient Acceptance, E,D, VU,NR by  at 1/23/2024 1744    Acceptance, E, VU,NR by DB at 1/22/2024 1427                         Point: Body mechanics (Done)       Learning Progress Summary             Patient Acceptance, E,D, VU,NR by  at 1/23/2024 1744    Acceptance, E, VU,NR by DB at 1/22/2024 1427                         Point: Precautions (Done)       Learning Progress Summary             Patient Acceptance, E,D, VU,NR by  at 1/23/2024 1744    Acceptance, E, VU,NR by DB at 1/22/2024 1427                                         User Key       Initials Effective Dates Name Provider Type Discipline     03/07/18 -  Rosa Portillo PTA Physical Therapist Assistant PT    DB 06/16/21 -  Felipa Weathers, GERI Physical Therapist PT                  PT Recommendation and Plan     Plan of Care Reviewed With: patient  Progress: declining  Outcome Evaluation: Pt agreed to try PT session, but weak and sore upon entry, pt alexei sup/sit w/mod 2, but standing attempts req max/dep 2-3 and unable to achieve full standing, RN and Tiarra assisted as pt req orthostatic BP checks and incorporated into PT session with their help, unsafe EOB so returned to bed after standing attempt     Time Calculation:         PT Charges       Row Name 01/23/24 0790              Time Calculation    Start Time 1638  -AMELIA      Stop Time 1702  -AMELIA      Time Calculation (min) 24 min  -AMELIA      PT Received On 01/23/24  -AMELIA      PT - Next Appointment 01/24/24  -AMELIA                User Key  (r) = Recorded By, (t) = Taken By, (c) = Cosigned By      Initials Name Provider Type    Rosa Asencio PTA Physical Therapist Assistant                  Therapy Charges for Today       Code Description Service Date Service Provider Modifiers Qty    63440346643 HC PT THER PROC EA 15 MIN 1/23/2024 Rosa Portillo PTA GP 2    98867523515 HC PT THER SUPP EA 15 MIN 1/23/2024 Rosa Portillo PTA GP 1            PT G-Codes  Outcome Measure Options: AM-PAC 6 Clicks Basic Mobility (PT)  AM-PAC 6 Clicks Score (PT): 8  PT Discharge Summary  Anticipated Discharge Disposition (PT): skilled nursing facility    Rosa Portillo PTA  1/23/2024

## 2024-01-23 NOTE — CONSULTS
[facility] Consult Note      Patient Name: Anthony Gallegos  : 1945  MRN: [id number]  Primary Care Physician:  Provider, No Known  Referring Physician: Casie Reardon MD  Date of admission: 2024    Subjective     Reason for Consult: Acute kidney injury    HPI:   Anthony Gallegos is a 78 y.o. male patient was admitted on 2024 with altered mental status noted to have increased creatinine his creatinine was 1.79 it was 0.88 on 2024, and its up to 2.15 today.  The patient has been having poor oral intake, he has been taking naproxen prior to admission.  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  He has history of BPH has been on tamsulosin.    He denies any chest pain, no shortness of air, no nausea or vomiting, has poor appetite, no abdominal pain, no dysuria or gross hematuria.  No visual impairment    Review of Systems:   14 point review of systems is otherwise negative except for mentioned above on HPI    Personal History     Past Medical History:   Diagnosis Date   • Abscess of scrotum    • Arthritis    • AS (ankylosing spondylitis)    • Hypertension    • Tetrahydrocannabinol (THC) use disorder, mild, abuse 2023   • Uveitis        Past Surgical History:   Procedure Laterality Date   • COLONOSCOPY     • ROTATOR CUFF REPAIR Right    • TOTAL HIP ARTHROPLASTY Left        Family History: family history includes Alzheimer's disease in his mother.    Social History:  reports that he has never smoked. He has never used smokeless tobacco. He reports that he does not drink alcohol and does not use drugs.    Home Medications:  Prior to Admission medications    Medication Sig Start Date End Date Taking? Authorizing Provider   atenolol (TENORMIN) 50 MG tablet Take 1 tablet by mouth Daily. 23   Tom Abel  MD [place]   esomeprazole (nexIUM) 20 MG capsule Take 1 capsule by mouth Daily As Needed (asneeded).    Elodia Sánchez MD   Lidocaine 4 % Place 2 patches on the skin as directed by provider Daily. Remove & Discard patch within 12 hours or as directed by MD 12/25/23   Tom Abel MD   methotrexate 2.5 MG tablet Take 1 tablet by mouth 2 (Two) Times a Week. Pt takes every Wednesday and thursday 1/3/18   Elodia Sánchez MD   muscle rub (BenGay) 10-15 % cream cream Apply  topically to the appropriate area as directed 4 (Four) Times a Day As Needed (sore muscles). 12/24/23   Tom Abel MD   Naproxen Sodium 220 MG capsule Take 220 mg of amoxicillin by mouth Daily.    Elodia Sánchez MD   Omega-3 Fatty Acids (OMEGA 3 PO) Take 1 capsule by mouth Daily.    Elodia Sánchez MD   pantoprazole (PROTONIX) 40 MG EC tablet Take 1 tablet by mouth Every Morning. 1/5/24   Shola Haque MD   tamsulosin (FLOMAX) 0.4 MG capsule 24 hr capsule Take 1 capsule by mouth Every Night. 12/24/23   Tom Abel MD       Allergies:  No Known Allergies    Objective     Vitals:   Temp:  [99.1 °F (37.3 °C)-100.6 °F (38.1 °C)] 99.1 °F (37.3 °C)  Heart Rate:  [75-80] 76  Resp:  [18] 18  BP: (101-139)/(57-74) 101/57    Intake/Output Summary (Last 24 hours) at 1/23/2024 1000  Last data filed at 1/23/2024 0542  Gross per 24 hour   Intake 446 ml   Output 700 ml   Net -254 ml       Physical Exam:   Constitutional: Awake, alert, no acute distress.  Chronically ill  HEENT: Sclera anicteric, no conjunctival injection, he is edentulous  Neck: Supple, no thyromegaly, no lymphadenopathy, trachea at midline, no JVD  Respiratory: Clear to auscultation bilaterally, nonlabored respiration  Cardiovascular: RRR, no murmurs, no rubs or gallops, no carotid bruit  Gastrointestinal: Positive bowel sounds, abdomen is soft, nontender and nondistended  : No palpable bladder  Musculoskeletal: No edema, no  clubbing or cyanosis  Psychiatric: Appropriate affect, cooperative  Neurologic: Oriented x3, moving all extremities, normal speech and mental status  Skin: Warm and dry       Scheduled Meds:     atenolol, 50 mg, Oral, Q24H  cefTRIAXone, 2,000 mg, Intravenous, Q24H  enoxaparin, 40 mg, Subcutaneous, Q24H  pantoprazole, 40 mg, Oral, Q AM  senna-docusate sodium, 2 tablet, Oral, BID  tamsulosin, 0.4 mg, Oral, Nightly      IV Meds:   sodium chloride, 100 mL/hr, Last Rate: 100 mL/hr (01/23/24 0542)        Results Reviewed:   I have personally reviewed the results from the time of this admission to 1/23/2024 10:00 EST     Lab Results   Component Value Date    GLUCOSE 96 01/23/2024    CALCIUM 8.0 (L) 01/23/2024     01/23/2024    K 4.7 01/23/2024    CO2 20.6 (L) 01/23/2024     01/23/2024    BUN 45 (H) 01/23/2024    CREATININE 2.15 (H) 01/23/2024    EGFRIFAFRI 84 03/27/2018    EGFRIFNONA 69 03/27/2018    BCR 20.9 01/23/2024    ANIONGAP 13.4 01/23/2024      Lab Results   Component Value Date    MG 2.5 (H) 01/23/2024    PHOS 3.1 01/06/2024    ALBUMIN 3.3 (L) 01/21/2024           Assessment / Plan       Transient alteration of awareness    Hypertension    DISH (disseminated idiopathic skeletal hyperostosis)    Generalized weakness    Altered mental status    GERD without esophagitis    BPH (benign prostatic hyperplasia)    Leukocytosis    UTI (urinary tract infection)    Dehydration    MARTITA (acute kidney injury)    Hyperglycemia    Altered mental state      ASSESSMENT:  Acute kidney injury probable ATN related to poor oral intake and continued intake of nonsteroidal anti-inflammatory drugs prior to his admission, he has evidence of UTI I doubt that he has acute interstitial nephritis has been chronically on PPI and it is very unusual for it to cause acute interstitial nephritis with such short period of time.  Enclosing spondylitis and uveitis treated with methotrexate  BPH treated with tamsulosin  UTI, treated with  ceftriaxone  Anemia, hemoglobin 11.8 and dropped since admission with hydration  Leukocytosis associated with his UTI.    PLAN:  Check random urine for sodium, chloride and protein to creatinine ratio  Check urine for eosinophils, I doubt he has interstitial nephritis  Bladder scan  Continue the same IV fluid  Check orthostatic blood pressures  Surveillance labs    I reviewed the chart and other providers notes, reviewed labs.  I discussed the case with the patient    Thank you for involving us in the care of Anthony Gallegos.  Please feel free to call with any questions.    Thompson Novoa MD  01/23/24  10:00 [facility]    [facility]  [phone]      Please note that portions of this note were completed with a voice recognition program.

## 2024-01-24 LAB
ALBUMIN SERPL-MCNC: 1.8 G/DL (ref 3.5–5.2)
ANION GAP SERPL CALCULATED.3IONS-SCNC: 9.1 MMOL/L (ref 5–15)
BACTERIA SPEC AEROBE CULT: ABNORMAL
BASOPHILS # BLD AUTO: 0.04 10*3/MM3 (ref 0–0.2)
BASOPHILS NFR BLD AUTO: 0.3 % (ref 0–1.5)
BUN SERPL-MCNC: 41 MG/DL (ref 8–23)
BUN/CREAT SERPL: 25 (ref 7–25)
CALCIUM SPEC-SCNC: 7.1 MG/DL (ref 8.6–10.5)
CHLORIDE SERPL-SCNC: 107 MMOL/L (ref 98–107)
CO2 SERPL-SCNC: 21.9 MMOL/L (ref 22–29)
CREAT SERPL-MCNC: 1.64 MG/DL (ref 0.76–1.27)
DEPRECATED RDW RBC AUTO: 42 FL (ref 37–54)
EGFRCR SERPLBLD CKD-EPI 2021: 42.3 ML/MIN/1.73
EOSINOPHIL # BLD AUTO: 0.1 10*3/MM3 (ref 0–0.4)
EOSINOPHIL NFR BLD AUTO: 0.6 % (ref 0.3–6.2)
ERYTHROCYTE [DISTWIDTH] IN BLOOD BY AUTOMATED COUNT: 13 % (ref 12.3–15.4)
GLUCOSE SERPL-MCNC: 98 MG/DL (ref 65–99)
HCT VFR BLD AUTO: 29.3 % (ref 37.5–51)
HGB BLD-MCNC: 10.1 G/DL (ref 13–17.7)
IMM GRANULOCYTES # BLD AUTO: 0.21 10*3/MM3 (ref 0–0.05)
IMM GRANULOCYTES NFR BLD AUTO: 1.3 % (ref 0–0.5)
LYMPHOCYTES # BLD AUTO: 0.83 10*3/MM3 (ref 0.7–3.1)
LYMPHOCYTES NFR BLD AUTO: 5.2 % (ref 19.6–45.3)
MAGNESIUM SERPL-MCNC: 2.4 MG/DL (ref 1.6–2.4)
MCH RBC QN AUTO: 30.8 PG (ref 26.6–33)
MCHC RBC AUTO-ENTMCNC: 34.5 G/DL (ref 31.5–35.7)
MCV RBC AUTO: 89.3 FL (ref 79–97)
MONOCYTES # BLD AUTO: 1.03 10*3/MM3 (ref 0.1–0.9)
MONOCYTES NFR BLD AUTO: 6.5 % (ref 5–12)
NEUTROPHILS NFR BLD AUTO: 13.65 10*3/MM3 (ref 1.7–7)
NEUTROPHILS NFR BLD AUTO: 86.1 % (ref 42.7–76)
NRBC BLD AUTO-RTO: 0 /100 WBC (ref 0–0.2)
PHOSPHATE SERPL-MCNC: 2.6 MG/DL (ref 2.5–4.5)
PLATELET # BLD AUTO: 218 10*3/MM3 (ref 140–450)
PMV BLD AUTO: 9.1 FL (ref 6–12)
POTASSIUM SERPL-SCNC: 4 MMOL/L (ref 3.5–5.2)
RBC # BLD AUTO: 3.28 10*6/MM3 (ref 4.14–5.8)
SODIUM SERPL-SCNC: 138 MMOL/L (ref 136–145)
URATE SERPL-MCNC: 5.6 MG/DL (ref 3.4–7)
WBC NRBC COR # BLD AUTO: 15.86 10*3/MM3 (ref 3.4–10.8)

## 2024-01-24 PROCEDURE — 83735 ASSAY OF MAGNESIUM: CPT | Performed by: STUDENT IN AN ORGANIZED HEALTH CARE EDUCATION/TRAINING PROGRAM

## 2024-01-24 PROCEDURE — 84550 ASSAY OF BLOOD/URIC ACID: CPT | Performed by: INTERNAL MEDICINE

## 2024-01-24 PROCEDURE — 97535 SELF CARE MNGMENT TRAINING: CPT

## 2024-01-24 PROCEDURE — 97530 THERAPEUTIC ACTIVITIES: CPT

## 2024-01-24 PROCEDURE — 97110 THERAPEUTIC EXERCISES: CPT

## 2024-01-24 PROCEDURE — 80069 RENAL FUNCTION PANEL: CPT | Performed by: INTERNAL MEDICINE

## 2024-01-24 PROCEDURE — 25010000002 CEFTRIAXONE PER 250 MG: Performed by: INTERNAL MEDICINE

## 2024-01-24 PROCEDURE — 85025 COMPLETE CBC W/AUTO DIFF WBC: CPT | Performed by: STUDENT IN AN ORGANIZED HEALTH CARE EDUCATION/TRAINING PROGRAM

## 2024-01-24 PROCEDURE — 25810000003 SODIUM CHLORIDE 0.9 % SOLUTION: Performed by: NURSE PRACTITIONER

## 2024-01-24 PROCEDURE — 25010000002 ENOXAPARIN PER 10 MG: Performed by: HOSPITALIST

## 2024-01-24 PROCEDURE — 25810000003 SODIUM CHLORIDE 0.9 % SOLUTION: Performed by: INTERNAL MEDICINE

## 2024-01-24 PROCEDURE — 97166 OT EVAL MOD COMPLEX 45 MIN: CPT

## 2024-01-24 RX ADMIN — PANTOPRAZOLE SODIUM 40 MG: 40 TABLET, DELAYED RELEASE ORAL at 05:24

## 2024-01-24 RX ADMIN — SENNOSIDES AND DOCUSATE SODIUM 2 TABLET: 50; 8.6 TABLET ORAL at 20:48

## 2024-01-24 RX ADMIN — CEFTRIAXONE 2000 MG: 2 INJECTION, POWDER, FOR SOLUTION INTRAMUSCULAR; INTRAVENOUS at 20:48

## 2024-01-24 RX ADMIN — SODIUM CHLORIDE 100 ML/HR: 9 INJECTION, SOLUTION INTRAVENOUS at 08:55

## 2024-01-24 RX ADMIN — ACETAMINOPHEN 650 MG: 325 TABLET, FILM COATED ORAL at 19:58

## 2024-01-24 RX ADMIN — SODIUM CHLORIDE 100 ML/HR: 9 INJECTION, SOLUTION INTRAVENOUS at 19:59

## 2024-01-24 RX ADMIN — TAMSULOSIN HYDROCHLORIDE 0.4 MG: 0.4 CAPSULE ORAL at 20:48

## 2024-01-24 RX ADMIN — ENOXAPARIN SODIUM 30 MG: 100 INJECTION SUBCUTANEOUS at 08:58

## 2024-01-24 RX ADMIN — SENNOSIDES AND DOCUSATE SODIUM 2 TABLET: 50; 8.6 TABLET ORAL at 08:55

## 2024-01-24 RX ADMIN — SODIUM CHLORIDE 500 ML: 9 INJECTION, SOLUTION INTRAVENOUS at 00:48

## 2024-01-24 NOTE — PROGRESS NOTES
[facility] Progress Note      Patient Name: Anthony Gallegos  : 1945  MRN: [id number]  Primary Care Physician:  Provider, No Known  Date of admission: 2024    Subjective     Interval History:   Follow-up acute kidney injury.  Coudé catheter placed yesterday for urinary retention.  Urine output 2.2 L.  Intake not recorded.  On IV fluids.Urine bloody.  Tried to eat lunch but did not like the food.  Review of Systems:   As noted above    Objective     Vitals:   Temp:  [98 °F (36.7 °C)-99.5 °F (37.5 °C)] 98.1 °F (36.7 °C)  Heart Rate:  [66-82] 73  Resp:  [18] 18  BP: ()/(42-60) 102/54    Intake/Output Summary (Last 24 hours) at 2024 1328  Last data filed at 2024 0935  Gross per 24 hour   Intake --   Output 2325 ml   Net -2325 ml       Physical Exam:    General Appearance: alert, oriented x 3, no acute distress   Skin: warm and dry  HEENT: Edentulous.  Wearing dark glasses.  Neck: supple, no JVD  Lungs: Clear to auscultation bilaterally.  Heart: RRR, normal S1 and S2  Abdomen: soft, nontender, nondistended. +bs  : coude catheter  Extremities: no edema.  Neuro: normal speech and mental status     Scheduled Meds:     atenolol, 50 mg, Oral, Q24H  cefTRIAXone, 2,000 mg, Intravenous, Q24H  enoxaparin, 30 mg, Subcutaneous, Q24H  pantoprazole, 40 mg, Oral, Q AM  senna-docusate sodium, 2 tablet, Oral, BID  tamsulosin, 0.4 mg, Oral, Nightly      IV Meds:   sodium chloride, 100 mL/hr, Last Rate: 100 mL/hr (24 0855)        Results Reviewed:   I have personally reviewed the results from the time of this admission to 2024 13:28 EST     Results from last 7 days   Lab Units 24  0326 24  0418 24  0437 24  1852   SODIUM mmol/L 138 139 138 139   POTASSIUM mmol/L 4.0 4.7 4.5 4.7   CHLORIDE mmol/L 107 105 101 100   CO2 mmol/L 21.9* 20.6* 23.4 26.0   BUN mg/dL 41* 45* 30* 27*   CREATININE mg/dL 1.64* 2.15* 1.58* 1.79*   CALCIUM mg/dL 7.1* 8.0* 8.8  9.5   BILIRUBIN mg/dL  --   --   --  0.5   ALK PHOS U/L  --   --   --  80   ALT (SGPT) U/L  --   --   --  22   AST (SGOT) U/L  --   --   --  47*   GLUCOSE mg/dL 98 96 108* 123*       Estimated Creatinine Clearance: 37.8 mL/min (A) (by C-G formula based on SCr of 1.64 mg/dL (H)).    Results from last 7 days   Lab Units 01/24/24  0326 01/23/24  0418   MAGNESIUM mg/dL 2.4 2.5*   PHOSPHORUS mg/dL 2.6  --        Results from last 7 days   Lab Units 01/24/24  0326 01/23/24  0418   URIC ACID mg/dL 5.6 5.9       Results from last 7 days   Lab Units 01/24/24  0326 01/23/24  0458 01/22/24  0437 01/21/24  1852   WBC 10*3/mm3 15.86* 21.70* 15.81* 17.86*   HEMOGLOBIN g/dL 10.1* 11.8* 13.1 12.3*   PLATELETS 10*3/mm3 218 262 350 372             Assessment / Plan     ASSESSMENT:  Acute kidney injury.  Nonoliguric.  Multifactorial including obstructive uropathy with requirement of a catheter placement.  Taking nonsteroidals prior to admission.  Proteinuria predicted by protein creatinine ratio of 8 g.  Likely due to NSAIDs.  Creatinine improving with IV fluids and discontinuation of NSAIDs as well as placement of catheter.  2.  Altered mental status improving.  3.  Ankylosing spondylitis, uveitis.  On methotrexate as an outpatient..  4.  Klebsiella urinary tract infection.  Ceftriaxone day 3 of 5.  5.  Hypertension.  Too controlled.  Stop atenolol unless there is another indication.  6.  BPH with urinary retention.  History of retention in the past.  Coudé catheter placed and patient on Flomax.  PLAN:  Discontinue atenolol if okay with Dr. Haque.  Monitor chemistries.  3.  RN instructed to record IV fluid intake.  Thank you for involving us in the care of Anthony Gallegos.  Please feel free to call with any questions.    Tosha Cummings MD  01/24/24  13:28 [facility]    [facility]  [phone]    Please note that portions of this note were completed with a voice recognition program.

## 2024-01-24 NOTE — THERAPY TREATMENT NOTE
Patient Name: Anthony Gallegos  : 1945    MRN: [id number]                              Today's Date: 2024       Admit Date: 2024    Visit Dx:     ICD-10-CM ICD-9-CM   1. Altered mental status, unspecified altered mental status type  R41.82 780.97   2. Leukocytosis, unspecified type  D72.829 288.60     Patient Active Problem List   Diagnosis    Ankylosing spondylitis of cervical region    Recent unexplained weight loss    Abnormal serum lipase level    Abnormal serum level of amylase    Hypertension    Boil    Immobility    Fall from bed    Tetrahydrocannabinol (THC) use disorder, mild, abuse    Generalized pain        Grief    DISH (disseminated idiopathic skeletal hyperostosis)    Generalized weakness    Severe malnutrition    Altered mental status    Transient alteration of awareness    GERD without esophagitis    BPH (benign prostatic hyperplasia)    Leukocytosis    UTI (urinary tract infection)    Dehydration    MARTITA (acute kidney injury)    Hyperglycemia    Altered mental state     Past Medical History:   Diagnosis Date    Abscess of scrotum     Arthritis     AS (ankylosing spondylitis)     Hypertension     Tetrahydrocannabinol (THC) use disorder, mild, abuse 2023    Uveitis      Past Surgical History:   Procedure Laterality Date    COLONOSCOPY      ROTATOR CUFF REPAIR Right     TOTAL HIP ARTHROPLASTY Left       General Information       Row Name 24 1359          Physical Therapy Time and Intention    Document Type therapy note (daily note)  Simultaneous filing. User may be unaware of other data.  -EM     Mode of Treatment co-treatment;occupational therapy;physical therapy;other (see comments)  Simultaneous filing. User may be unaware of other data.  -EM       Row Name 24 1359          General Information    Existing Precautions/Restrictions fall  Simultaneous filing. User may be unaware of other data.  -EM       Row Name 24 7588          Safety Issues,  "Functional Mobility    Comment, Safety Issues/Impairments (Mobility) Co treatment medically appropriate and necessary due to patient acuity level, activity tolerance and safety of patient and staff. Treatment is focusing on progression of care and goals established in the POC.  Simultaneous filing. User may be unaware of other data.  -EM               User Key  (r) = Recorded By, (t) = Taken By, (c) = Cosigned By      Initials Name Provider Type    Cierra Thomas PT Physical Therapist                   Mobility       Row Name 01/24/24 5278          Bed Mobility    Bed Mobility rolling right;sit-supine  -EM     Rolling Right [place] (Bed Mobility) maximum assist (25% patient effort);verbal cues  -EM     Supine-Sit [place] (Bed Mobility) moderate assist (50% patient effort);2 person assist;verbal cues  -EM     Sit-Supine [place] (Bed Mobility) moderate assist (50% patient effort);2 person assist;verbal cues  -EM     Assistive Device (Bed Mobility) head of bed elevated;bed rails  -EM       Row Name 01/24/24 5363          Sit-Stand Transfer    Sit-Stand [place] (Transfers) moderate assist (50% patient effort);maximum assist (25% patient effort);2 person assist;verbal cues  -EM     Assistive Device (Sit-Stand Transfers) walker, front-wheeled  -EM     Comment, (Sit-Stand Transfer) pt able to fully stand x 3 trials, takes extra time and cues for full hip/knee extention for upright posture, took sidesteps up to head of bed with rwx and minAx2  -EM               User Key  (r) = Recorded By, (t) = Taken By, (c) = Cosigned By      Initials Name Provider Type    Cierra Thomas PT Physical Therapist                   Obj/Interventions       Row Name 01/24/24 4693          Motor Skills    Therapeutic Exercise other (see comments)  AP, LAQ x 5-10 reps with limited ROm demonstrated during completion of exercises, pt c/o feeling \"stiff\". pt instructed in AP and QS once returned to supine to " perform ad cassia  -EM               User Key  (r) = Recorded By, (t) = Taken By, (c) = Cosigned By      Initials Name Provider Type    Cierra Thomas PT Physical Therapist                   Goals/Plan    No documentation.                  Clinical Impression       Row Name 01/24/24 1359          Pain    Pretreatment Pain Rating 0/10 - no pain  -EM       Row Name 01/24/24 1359          Plan of Care Review    Plan of Care Reviewed With patient  -EM     Outcome Evaluation Pt agreeable to therapy, moves very slowly and c/o feeling stiff. Patient peformed bed mobility wtih maxAx2, sat up on EOB with CGA/Chuckie until balanced on EOB, once positioned on EOb could sit with SBA and maintain sitting balance. patient performed LE ROm exercises with limited ROM, cues for technique. Patient able to stand x 3 trials at the bedside with rwx and mod/maxAx2, able to achieve full upright standing on all 3 attempts, took some sidesteps up to head of bed with rwx and minAx2. Pt demonstrates improvement today in standing. Recommend SNU at d/c.  -EM       Row Name 01/24/24 4231          Positioning and Restraints    Pre-Treatment Position in bed  -EM     Post Treatment Position bed  -EM     In Bed supine;call light within reach;exit alarm on  -EM               User Key  (r) = Recorded By, (t) = Taken By, (c) = Cosigned By      Initials Name Provider Type    Cierra Thomas PT Physical Therapist                   Outcome Measures       Row Name 01/24/24 1404 01/24/24 0859       How much help from another person do you currently need...    Turning from your back to your side while in flat bed without using bedrails? 2  -EM 2  -CT    Moving from lying on back to sitting on the side of a flat bed without bedrails? 2  -EM 2  -CT    Moving to and from a bed to a chair (including a wheelchair)? 2  -EM 1  -CT    Standing up from a chair using your arms (e.g., wheelchair, bedside chair)? 2  -EM 1  -CT    Climbing 3-5 steps with a  railing? 1  -EM 1  -CT    To walk in hospital room? 1  -EM 1  -CT    AM-PAC 6 Clicks Score (PT) 10  -EM 8  -CT    Highest Level of Mobility Goal 4 --> Transfer to chair/commode  -EM 3 --> Sit at edge of bed  -CT              User Key  (r) = Recorded By, (t) = Taken By, (c) = Cosigned By      Initials Name Provider Type    EM Cierra Valdivia, PT Physical Therapist    CT Sergio, Jena Ro, RN Registered Nurse                                 Physical Therapy Education       Title: PT OT SLP Therapies (Done)       Topic: Physical Therapy (Done)       Point: Mobility training (Done)       Learning Progress Summary             Patient Acceptance, E, VU by  at 1/24/2024 1404    Acceptance, E,D, VU,NR by  at 1/23/2024 1744    Acceptance, E, VU,NR by  at 1/22/2024 1427                         Point: Home exercise program (Done)       Learning Progress Summary             Patient Acceptance, E, VU by  at 1/24/2024 1404    Acceptance, E,D, VU,NR by  at 1/23/2024 1744    Acceptance, E, VU,NR by DB at 1/22/2024 1427                         Point: Body mechanics (Done)       Learning Progress Summary             Patient Acceptance, E,D, VU,NR by  at 1/23/2024 1744    Acceptance, E, VU,NR by DB at 1/22/2024 1427                         Point: Precautions (Done)       Learning Progress Summary             Patient Acceptance, E,D, VU,NR by  at 1/23/2024 1744    Acceptance, E, VU,NR by  at 1/22/2024 1427                                         User Key       Initials Effective Dates Name Provider Type Discipline     06/16/21 -  Cierra Valdivia, PT Physical Therapist PT     03/07/18 -  Rosa Portillo PTA Physical Therapist Assistant PT    DB 06/16/21 -  Felipa Weathers PT Physical Therapist PT                  PT Recommendation and Plan     Plan of Care Reviewed With: patient  Outcome Evaluation: Pt agreeable to therapy, moves very slowly and c/o feeling stiff. Patient peformed bed mobility wtih  maxAx2, sat up on EOB with CGA/Chuckie until balanced on EOB, once positioned on EOb could sit with SBA and maintain sitting balance. patient performed LE ROm exercises with limited ROM, cues for technique. Patient able to stand x 3 trials at the bedside with rwx and mod/maxAx2, able to achieve full upright standing on all 3 attempts, took some sidesteps up to head of bed with rwx and minAx2. Pt demonstrates improvement today in standing. Recommend SNU at d/c.     Time Calculation:         PT Charges       Row Name 01/24/24 1405             Time Calculation    Start Time 1041  -EM      Stop Time 1111  -EM      Time Calculation (min) 30 min  -EM      PT Received On 01/24/24  -EM      PT - Next Appointment 01/25/24  -EM         Time Calculation- PT    Total Timed Code Minutes- PT 30 minute(s)  -EM         Timed Charges    76707 - PT Therapeutic Exercise Minutes 10  -EM      94285 - PT Therapeutic Activity Minutes 20  -EM         Total Minutes    Timed Charges Total Minutes 30  -EM       Total Minutes 30  -EM                User Key  (r) = Recorded By, (t) = Taken By, (c) = Cosigned By      Initials Name Provider Type    EM Cierra Valdivia PT Physical Therapist                  Therapy Charges for Today       Code Description Service Date Service Provider Modifiers Qty    00024280435 HC PT THER PROC EA 15 MIN 1/24/2024 Cierra Valdivia PT GP 1    [id number] HC PT THERAPEUTIC ACT EA 15 MIN 1/24/2024 Cierra Valdivia PT GP 1            PT G-Codes  Outcome Measure Options: AM-PAC 6 Clicks Basic Mobility (PT)  AM-PAC 6 Clicks Score (PT): 10  PT Discharge Summary  Anticipated Discharge Disposition (PT): skilled nursing facility    Cierra Valdivia PT  1/24/2024

## 2024-01-24 NOTE — PROGRESS NOTES
Dedicated to Hospital Care    [phone]   LOS: 2 days     Name: Anthony Gallegos  Age/Sex: 78 y.o. male  :  1945        PCP: Provider, No Known  Chief Complaint   Patient presents with    Dysuria    Altered Mental Status      Subjective   Observed working with physical therapy today.  He is doing okay from that perspective.  Denies other new issues or complaints right now.  General: No Fever or Chills, Cardiac: No Chest Pain or Palpitations, Resp: No Cough or SOA, GI: No Nausea, Vomiting, or Diarrhea, and Other: No bleeding    atenolol, 50 mg, Oral, Q24H  cefTRIAXone, 2,000 mg, Intravenous, Q24H  enoxaparin, 30 mg, Subcutaneous, Q24H  pantoprazole, 40 mg, Oral, Q AM  senna-docusate sodium, 2 tablet, Oral, BID  tamsulosin, 0.4 mg, Oral, Nightly      sodium chloride, 100 mL/hr, Last Rate: 100 mL/hr (24 0855)        Objective   Vital Signs  Temp:  [98 °F (36.7 °C)-99.5 °F (37.5 °C)] 98 °F (36.7 °C)  Heart Rate:  [66-82] 66  Resp:  [18] 18  BP: ()/(42-60) 96/51  Body mass index is 20.17 kg/m².    Intake/Output Summary (Last 24 hours) at 2024 1226  Last data filed at 2024 0935  Gross per 24 hour   Intake --   Output 2325 ml   Net -2325 ml       Physical Exam  Vitals and nursing note reviewed.   Constitutional:       General: He is not in acute distress.     Appearance: He is ill-appearing.   Cardiovascular:      Rate and Rhythm: Normal rate and regular rhythm.   Pulmonary:      Effort: No respiratory distress.      Breath sounds: Normal breath sounds.   Abdominal:      General: Bowel sounds are normal.      Palpations: Abdomen is soft.   Neurological:      General: No focal deficit present.      Mental Status: He is alert. Mental status is at baseline.           Results Review:       I reviewed the patient's new clinical results.  Results from last 7 days   Lab Units 24  0326 24  0458 24  0437 24  1852   WBC 10*3/mm3 15.86* 21.70* 15.81* 17.86*   HEMOGLOBIN g/dL  10.1* 11.8* 13.1 12.3*   PLATELETS 10*3/mm3 218 262 350 372     Results from last 7 days   Lab Units 01/24/24  0326 01/23/24  0418 01/22/24  0437 01/21/24  1852   SODIUM mmol/L 138 139 138 139   POTASSIUM mmol/L 4.0 4.7 4.5 4.7   CHLORIDE mmol/L 107 105 101 100   CO2 mmol/L 21.9* 20.6* 23.4 26.0   BUN mg/dL 41* 45* 30* 27*   CREATININE mg/dL 1.64* 2.15* 1.58* 1.79*   CALCIUM mg/dL 7.1* 8.0* 8.8 9.5   MAGNESIUM mg/dL 2.4 2.5*  --   --    PHOSPHORUS mg/dL 2.6  --   --   --    Estimated Creatinine Clearance: 37.8 mL/min (A) (by C-G formula based on SCr of 1.64 mg/dL (H)).      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       PLAN  Mr. Gallegos is a 78 y.o. male with a history of DISH, hypertension, ankylosing spondylitis and impaired mobility who presented to [facility] initially complaining of confusion and was found to have urinary tract infection and admitted to monitored unit  -His white blood cell count remains elevated.  CT scan of the abdomen and pelvis yesterday showed continued bladder distention and some evidence of possible pyelonephritis with hydronephrosis and stranding.  Mariscal catheter was placed and urology was consulted.  Urine culture is now growing Klebsiella but is sensitive to current antibiotics.  -Given worsening renal function will asked nephrology to evaluate.  Baseline  creatinine around 0.8 peaked on this admission at 2.1 today it has improved to 1.6 appreciate nephrology input  -His mental status for me seems back to baseline.  He remembers me from prior hospitalization  -Lovenox for DVT prophylaxis  -Full code      Disposition  Expected Discharge Date: 1/24/2024; Expected Discharge Time:        Shola Haque MD  [facility]  01/24/24  12:26 EST

## 2024-01-24 NOTE — THERAPY EVALUATION
Patient Name: Anthony Gallegos  : 1945    MRN: [id number]                              Today's Date: 2024       Admit Date: 2024    Visit Dx:     ICD-10-CM ICD-9-CM   1. Altered mental status, unspecified altered mental status type  R41.82 780.97   2. Leukocytosis, unspecified type  D72.829 288.60     Patient Active Problem List   Diagnosis    Ankylosing spondylitis of cervical region    Recent unexplained weight loss    Abnormal serum lipase level    Abnormal serum level of amylase    Hypertension    Boil    Immobility    Fall from bed    Tetrahydrocannabinol (THC) use disorder, mild, abuse    Generalized pain        Grief    DISH (disseminated idiopathic skeletal hyperostosis)    Generalized weakness    Severe malnutrition    Altered mental status    Transient alteration of awareness    GERD without esophagitis    BPH (benign prostatic hyperplasia)    Leukocytosis    UTI (urinary tract infection)    Dehydration    MARTITA (acute kidney injury)    Hyperglycemia    Altered mental state     Past Medical History:   Diagnosis Date    Abscess of scrotum     Arthritis     AS (ankylosing spondylitis)     Hypertension     Tetrahydrocannabinol (THC) use disorder, mild, abuse 2023    Uveitis      Past Surgical History:   Procedure Laterality Date    COLONOSCOPY      ROTATOR CUFF REPAIR Right     TOTAL HIP ARTHROPLASTY Left       General Information       Row Name 24 1355          General Information    Patient Profile Reviewed yes  -     Prior Level of Function --  Patient was in [facility] for less than 1 week before readmission to hospital  -     Barriers to Rehab medically complex  -       Row Name 24 1355          Living Environment    People in Home --  [facility]  -       Row Name 24 1355          Cognition    Orientation Status (Cognition) oriented x 3  -       Row Name 24 1352          Safety Issues, Functional Mobility    Safety Issues Affecting Function  (Mobility) safety precaution awareness;safety precautions follow-through/compliance  -     Impairments Affecting Function (Mobility) balance;coordination;endurance/activity tolerance;strength;postural/trunk control  -               User Key  (r) = Recorded By, (t) = Taken By, (c) = Cosigned By      Initials Name Provider Type     Nessa Orellana OT Occupational Therapist                     Mobility/ADL's       Row Name 01/24/24 1403          Bed Mobility    Bed Mobility rolling right;sit-supine  -     Rolling Right [place] (Bed Mobility) maximum assist (25% patient effort);verbal cues  -     Supine-Sit [place] (Bed Mobility) moderate assist (50% patient effort);2 person assist;verbal cues  -     Sit-Supine [place] (Bed Mobility) moderate assist (50% patient effort);2 person assist;verbal cues  -     Assistive Device (Bed Mobility) head of bed elevated;bed rails  -       Row Name 01/24/24 1403          Sit-Stand Transfer    Sit-Stand [place] (Transfers) moderate assist (50% patient effort);maximum assist (25% patient effort);2 person assist;verbal cues  -     Assistive Device (Sit-Stand Transfers) walker, front-wheeled  -       Row Name 01/24/24 1403          Functional Mobility    Patient was able to Ambulate no, other medical factors prevent ambulation  -[facility] Name 01/24/24 1403          Activities of Daily Living    BADL Assessment/Intervention grooming  -       Row Name 01/24/24 1403          Grooming Assessment/Training    [place] Level (Grooming) grooming skills;wash face, hands;standby assist  -     Position (Grooming) edge of bed sitting  -               User Key  (r) = Recorded By, (t) = Taken By, (c) = Cosigned By      Initials Name Provider Type     Nessa Orellana OT Occupational Therapist                   Obj/Interventions       Row Name 01/24/24 1403          Sensory Assessment (Somatosensory)    Sensory Assessment (Somatosensory) sensation  intact  -[facility] Name 01/24/24 1403          Vision Assessment/Intervention    Visual Impairment/Limitations WFL  -LH       Row Name 01/24/24 1403          Range of Motion Comprehensive    General Range of Motion bilateral upper extremity ROM WFL  -LH       Row Name 01/24/24 1403          Strength Comprehensive (MMT)    General Manual Muscle Testing (MMT) Assessment upper extremity strength deficits identified  -LH       Row Name 01/24/24 1403          Motor Skills    Motor Skills functional endurance  -     Functional Endurance Poor  -LH       Row Name 01/24/24 1403          Balance    Balance Assessment sitting static balance;sitting dynamic balance;sit to stand dynamic balance;standing static balance  -     Static Sitting Balance standby assist  -     Dynamic Sitting Balance contact guard  -     Position, Sitting Balance unsupported;sitting edge of bed  -     Sit to Stand Dynamic Balance maximum assist;2-person assist  -     Static Standing Balance contact guard;verbal cues  -     Dynamic Standing Balance minimal assist;2-person assist;verbal cues  -     Position/Device Used, Standing Balance supported;walker, front-wheeled  -     Balance Interventions sitting;sit to stand;standing;occupation based/functional task  -               User Key  (r) = Recorded By, (t) = Taken By, (c) = Cosigned By      Initials Name Provider Type     Nessa Orellana OT Occupational Therapist                   Goals/Plan       Row Name 01/24/24 1411          Bed Mobility Goal 1 (OT)    Activity/Assistive Device (Bed Mobility Goal 1, OT) bed mobility activities, all  -     [place] Level/Cues Needed (Bed Mobility Goal 1, OT) modified independence  -     Time Frame (Bed Mobility Goal 1, OT) short term goal (STG);2 weeks  -LH       Row Name 01/24/24 1411          Transfer Goal 1 (OT)    Activity/Assistive Device (Transfer Goal 1, OT) transfers, all  -     [place] Level/Cues Needed (Transfer Goal 1,  OT) modified independence  -     Time Frame (Transfer Goal 1, OT) short term goal (STG);2 weeks  -     Progress/Outcome (Transfer Goal 1, OT) new goal  -       Row Name 01/24/24 1411          Dressing Goal 1 (OT)    Activity/Device (Dressing Goal 1, OT) dressing skills, all;upper body dressing;lower body dressing  -     Bent/Cues Needed (Dressing Goal 1, OT) modified independence  [facility]     Time Frame (Dressing Goal 1, OT) short term goal (STG);2 weeks  -     Progress/Outcome (Dressing Goal 1, OT) new goal  -       Row Name 01/24/24 1411          Toileting Goal 1 (OT)    Activity/Device (Toileting Goal 1, OT) toileting skills, all  -     [place] Level/Cues Needed (Toileting Goal 1, OT) modified independence  [facility]     Time Frame (Toileting Goal 1, OT) short term goal (STG);2 weeks  -     Progress/Outcome (Toileting Goal 1, OT) new goal  -[facility] Name 01/24/24 1411          Grooming Goal 1 (OT)    Activity/Device (Grooming Goal 1, OT) grooming skills, all  -     Bent (Grooming Goal 1, OT) modified [facility]     Time Frame (Grooming Goal 1, OT) short term goal (STG);2 weeks  -     Progress/Outcome (Grooming Goal 1, OT) new [facility]  -       Row Name 01/24/24 1411          Problem Specific Goal 1 (OT)    Problem Specific Goal 1 (OT) Patient will implement energy conservation and work simplification strategies in order to complete ADL routine independently.  -     Time Frame (Problem Specific Goal 1, OT) short term goal (STG);2 weeks  -     Progress/Outcome (Problem Specific Goal 1, OT) new [facility]  -       Row Name 01/24/24 1411          Therapy Assessment/Plan (OT)    Planned Therapy Interventions (OT) activity tolerance training;BADL retraining;functional balance retraining;occupation/activity based interventions;patient/caregiver education/training;strengthening exercise;transfer/mobility retraining  -               User Key  (r) = Recorded By, (t) = Taken By, (c)  = Cosigned By      Initials Name Provider Type     Nessa Orellana, OT Occupational Therapist                   Clinical Impression       Row Name 01/24/24 1403          Pain Assessment    Pretreatment Pain Rating 0/10 - no pain  -     Posttreatment Pain Rating 0/10 - no pain  -     Pre/Posttreatment Pain Comment No c/o pain today  -     Pain Intervention(s) Repositioned  -       Row Name 01/24/24 1402          Plan of Care Review    Plan of Care Reviewed With patient  -     Outcome Evaluation Patient is admitted to [facility] for AMS on 1/21/2024. PMHx includes HTN, arthritis, and ankylosing spondylitis. Pt was recently admitted to [facility] in December for a fall, prior to that admit was living at home alone. Pt was discharged to SNF where he has been since last hospital stay. Patient has very slow ranjit, and moves/talks slowly throughout visit today. Patient performed bed mobility with max A x2, able to sit up at EOB with CGA. Patient performed sit to stand x2 with max Ax2, but once able to stand supported with walker, able to remain standing with CGA/min A. Patient will continue to benefit from skilled OT to address current functional deficits, anticipate need for SNF at [facility].  -       Row Name 01/24/24 1408          Therapy Assessment/Plan (OT)    Rehab Potential (OT) good, to achieve stated therapy goals  -     Criteria for Skilled Therapeutic Interventions Met (OT) yes;meets criteria;skilled treatment is necessary  -     Therapy Frequency (OT) 5 times/wk  -       Row Name 01/24/24 1402          Therapy Plan Review/Discharge Plan (OT)    Anticipated Discharge Disposition (OT) skilled nursing facility  -       Row Name 01/24/24 140          Positioning and Restraints    Pre-Treatment Position in bed  -     Post Treatment Position bed  -     In Bed call light within reach;encouraged to call for assist;exit alarm on;fowlers  -               User Key  (r) = Recorded By, (t) = Taken By, (c) =  Cosigned By      Initials Name Provider Type     Nessa Orellana, OT Occupational Therapist                   Outcome Measures       Row Name 01/24/24 1412          How much help from another is currently needed...    Putting on and taking off regular lower body clothing? 1  -LH     Bathing (including washing, rinsing, and drying) 1  -LH     Toileting (which includes using toilet bed pan or urinal) 1  -LH     Putting on and taking off regular upper body clothing 1  -LH     Taking care of personal grooming (such as brushing teeth) 3  -LH     Eating meals 3  -     AM-PAC 6 Clicks Score (OT) 10  -       Row Name 01/24/24 1404 01/24/24 0859       How much help from another person do you currently need...    Turning from your back to your side while in flat bed without using bedrails? 2  -EM 2  -CT    Moving from lying on back to sitting on the side of a flat bed without bedrails? 2  -EM 2  -CT    Moving to and from a bed to a chair (including a wheelchair)? 2  -EM 1  -CT    Standing up from a chair using your arms (e.g., wheelchair, bedside chair)? 2  -EM 1  -CT    Climbing 3-5 steps with a railing? 1  -EM 1  -CT    To walk in hospital room? 1  -EM 1  -CT    AM-PAC 6 Clicks Score (PT) 10  -EM 8  -CT    Highest Level of Mobility Goal 4 --> Transfer to chair/commode  -EM 3 --> Sit at edge of bed  -CT      Row Name 01/24/24 1412          Modified [place] Scale    Modified [place] Scale 4 - Moderately severe disability.  Unable to walk without assistance, and unable to attend to own bodily needs without assistance.  -       Row Name 01/24/24 1412          Functional Assessment    Outcome Measure Options AM-PAC 6 Clicks Daily Activity (OT);Modified [place]  -               User Key  (r) = Recorded By, (t) = Taken By, (c) = Cosigned By      Initials Name Provider Type    EM Cierra Valdivia, PT Physical Therapist    CT Sergio, Jena Ro, RN Registered Nurse     Nessa Orellana, STUART Occupational Therapist                     Occupational Therapy Education       Title: PT OT SLP Therapies (Done)       Topic: Occupational Therapy (Done)       Point: ADL training (Done)       Description:   Instruct learner(s) on proper safety adaptation and remediation techniques during self care or transfers.   Instruct in proper use of assistive devices.                  Learning Progress Summary             Patient Acceptance, E,TB, VU by  at 1/24/2024 1412    Comment: Patient is instructed in role of OT, discharge planning, home safety, energy conservation/work simplification strategies                         Point: Home exercise program (Done)       Description:   Instruct learner(s) on appropriate technique for monitoring, assisting and/or progressing therapeutic exercises/activities.                  Learning Progress Summary             Patient Acceptance, E,TB, VU by  at 1/24/2024 1412    Comment: Patient is instructed in role of OT, discharge planning, home safety, energy conservation/work simplification strategies                         Point: Precautions (Done)       Description:   Instruct learner(s) on prescribed precautions during self-care and functional transfers.                  Learning Progress Summary             Patient Acceptance, E,TB, VU by  at 1/24/2024 1412    Comment: Patient is instructed in role of OT, discharge planning, home safety, energy conservation/work simplification strategies                         Point: Body mechanics (Done)       Description:   Instruct learner(s) on proper positioning and spine alignment during self-care, functional mobility activities and/or exercises.                  Learning Progress Summary             Patient Acceptance, E,TB, VU by  at 1/24/2024 1412    Comment: Patient is instructed in role of OT, discharge planning, home safety, energy conservation/work simplification strategies                                         User Key       Initials Effective Dates Name  Provider Type Discipline     08/31/23 -  Nessa Orellana, STUART Occupational Therapist OT                  OT Recommendation and Plan  Planned Therapy Interventions (OT): activity tolerance training, BADL retraining, functional balance retraining, occupation/activity based interventions, patient/caregiver education/training, strengthening exercise, transfer/mobility retraining  Therapy Frequency (OT): 5 times/wk  Plan of Care Review  Plan of Care Reviewed With: patient  Outcome Evaluation: Patient is admitted to [facility] for AMS on 1/21/2024. PMHx includes HTN, arthritis, and ankylosing spondylitis. Pt was recently admitted to [facility] in December for a fall, prior to that admit was living at home alone. Pt was discharged to SNF where he has been since last hospital stay. Patient has very slow ranjit, and moves/talks slowly throughout visit today. Patient performed bed mobility with max A x2, able to sit up at EOB with CGA. Patient performed sit to stand x2 with max Ax2, but once able to stand supported with walker, able to remain standing with CGA/min A. Patient will continue to benefit from skilled OT to address current functional deficits, anticipate need for SNF at [facility].     Time Calculation:   Evaluation Complexity (OT)  Review Occupational Profile/Medical/Therapy History Complexity: expanded/moderate complexity  Assessment, Occupational Performance/Identification of Deficit Complexity: 3-5 performance deficits  Clinical Decision Making Complexity (OT): detailed assessment/moderate complexity  Overall Complexity of Evaluation (OT): moderate complexity     Time Calculation- OT       Row Name 01/24/24 1413             Time Calculation-     OT Start Time 1040  -      OT Stop Time 1111  -      OT Time Calculation (min) 31 min  -      Total Timed Code Minutes- OT 15 minute(s)  -      OT Non-Billable Time (min) 16 min  -      OT Received On 01/24/24  -      OT - Next Appointment 01/25/24  -      OT Goal  Re-Cert Due Date 02/07/24  -         Timed Charges    98967 - OT Self Care/Mgmt Minutes 15  -LH         Untimed Charges    OT Eval/Re-eval Minutes 16  -LH         Total Minutes    Timed Charges Total Minutes 15  -LH      Untimed Charges Total Minutes 16  -LH       Total Minutes 31  -LH                User Key  (r) = Recorded By, (t) = Taken By, (c) = Cosigned By      Initials Name Provider Type     Nessa Orellana OT Occupational Therapist                  Therapy Charges for Today       Code Description Service Date Service Provider Modifiers Qty    71946331119 HC OT SELF CARE/MGMT/TRAIN EA 15 MIN 1/24/2024 Nessa Orellana OT GO 1    [id number] HC OT EVAL MOD COMPLEXITY 3 1/24/2024 Nessa Orellana OT GO 1                 Nessa Orellana OT  1/24/2024

## 2024-01-24 NOTE — PROGRESS NOTES
"   LOS: 2 days   Patient Care Team:  Provider, No Known as PCP - General      Subjective   Interval History: Comfortable, no problems with catheter overnight.    Objective     ROS   12 POINT NEG ROS PERTINENT IN HPI      Vital Signs  Temp:  [98 °F (36.7 °C)-99.5 °F (37.5 °C)] 98 °F (36.7 °C)  Heart Rate:  [66-82] 66  Resp:  [18] 18  BP: ()/(42-60) 96/51      Intake/Output Summary (Last 24 hours) at 1/24/2024 0826  Last data filed at 1/24/2024 0541  Gross per 24 hour   Intake --   Output 2225 ml   Net -2225 ml       Flowsheet Rows      Flowsheet Row First Filed Value   Admission Height 190.5 cm (75\") Documented at 01/21/2024 1744   Admission Weight 81.6 kg (180 lb) Documented at 01/21/2024 1744            Physical Exam:     General appearance: alert, cooperative, oriented  Mariscal intact, urine light pink      Lab Results (all)       Procedure Component Value Units Date/Time    Blood Culture - Blood, Hand, Right [[id number]]  (Normal) Collected: 01/22/24 0437    Specimen: Blood from Hand, Right Updated: 01/24/24 0500     Blood Culture No growth at 2 days    Narrative:      Less than seven (7) mL's of blood was collected.  Insufficient quantity may yield false negative results.    Renal Function Panel [[id number]]  (Abnormal) Collected: 01/24/24 0326    Specimen: Blood Updated: 01/24/24 0416     Glucose 98 mg/dL      BUN 41 mg/dL      Creatinine 1.64 mg/dL      Sodium 138 mmol/L      Potassium 4.0 mmol/L      Chloride 107 mmol/L      CO2 21.9 mmol/L      Calcium 7.1 mg/dL      Albumin 1.8 g/dL      Phosphorus 2.6 mg/dL      Anion Gap 9.1 mmol/L      BUN/Creatinine Ratio 25.0     eGFR 42.3 mL/min/1.73     Narrative:      GFR Normal >60  Chronic Kidney Disease <60  Kidney Failure <15    The GFR formula is only valid for adults with stable renal function between ages 18 and 70.    Uric Acid [[id number]]  (Normal) Collected: 01/24/24 0326    Specimen: Blood Updated: 01/24/24 0400     Uric Acid 5.6 mg/dL     Magnesium " [[id number]]  (Normal) Collected: 01/24/24 0326    Specimen: Blood Updated: 01/24/24 0400     Magnesium 2.4 mg/dL     CBC & Differential [[id number]]  (Abnormal) Collected: 01/24/24 0326    Specimen: Blood Updated: 01/24/24 0341    Narrative:      The following orders were created for panel order CBC & Differential.  Procedure                               Abnormality         Status                     ---------                               -----------         ------                     CBC Auto Differential[[id number]]        Abnormal            Final result                 Please view results for these tests on the individual orders.    CBC Auto Differential [[id number]]  (Abnormal) Collected: 01/24/24 0326    Specimen: Blood Updated: 01/24/24 0341     WBC 15.86 10*3/mm3      RBC 3.28 10*6/mm3      Hemoglobin 10.1 g/dL      Hematocrit 29.3 %      MCV 89.3 fL      MCH 30.8 pg      MCHC 34.5 g/dL      RDW 13.0 %      RDW-SD 42.0 fl      MPV 9.1 fL      Platelets 218 10*3/mm3      Neutrophil % 86.1 %      Lymphocyte % 5.2 %      Monocyte % 6.5 %      Eosinophil % 0.6 %      Basophil % 0.3 %      Immature Grans % 1.3 %      Neutrophils, Absolute 13.65 10*3/mm3      Lymphocytes, Absolute 0.83 10*3/mm3      Monocytes, Absolute 1.03 10*3/mm3      Eosinophils, Absolute 0.10 10*3/mm3      Basophils, Absolute 0.04 10*3/mm3      Immature Grans, Absolute 0.21 10*3/mm3      nRBC 0.0 /100 WBC     Blood Culture - Blood, Hand, Right [[id number]]  (Normal) Collected: 01/21/24 2138    Specimen: Blood from Hand, Right Updated: 01/23/24 2200     Blood Culture No growth at 2 days    Eosinophil Smear - Urine, Urine, Clean Catch [[id number]]  (Normal) Collected: 01/23/24 1454    Specimen: Urine, Clean Catch Updated: 01/23/24 1634     Eosinophil Smear 0 % EOS/100 Cells     Protein / Creatinine Ratio, Urine - Indwelling Urethral Catheter [[id number]]  (Abnormal) Collected: 01/23/24 1454    Specimen: Urine from Indwelling Urethral Catheter  Updated: 01/23/24 1544     Protein/Creatinine Ratio, Urine 8,472.5 mg/G Crea      Creatinine, Urine 34.5 mg/dL      Total Protein, Urine 292.3 mg/dL     Chloride, Urine, Random - Indwelling Urethral Catheter [[id number]] Collected: 01/23/24 1454    Specimen: Urine from Indwelling Urethral Catheter Updated: 01/23/24 1533     Chloride, Urine 75 mmol/L     Narrative:      Reference intervals for random urine have not been established.  Clinical usage is dependent upon physician's interpretation in combination with other laboratory tests.       Sodium, Urine, Random - Urine, Clean Catch [[id number]] Collected: 01/23/24 1454    Specimen: Urine, Clean Catch Updated: 01/23/24 1532     Sodium, Urine 107 mmol/L     Narrative:      Reference intervals for random urine have not been established.  Clinical usage is dependent upon physician's interpretation in combination with other laboratory tests.       Urea Nitrogen, Urine - Urine, Clean Catch [[id number]] Collected: 01/23/24 1454    Specimen: Urine, Clean Catch Updated: 01/23/24 1532     Urea Nitrogen, Urine 273 mg/dL     Narrative:      Reference intervals for random urine have not been established.  Clinical usage is dependent upon physician's interpretation in combination with other laboratory tests.       Creatinine Urine Random (kidney function) GFR component - Urine, Clean Catch [[id number]] Collected: 01/23/24 1454    Specimen: Urine, Clean Catch Updated: 01/23/24 1532     Creatinine, Urine 39.6 mg/dL     Narrative:      Reference intervals for random urine have not been established.  Clinical usage is dependent upon physician's interpretation in combination with other laboratory tests.       NEHEMIAH AURIS SCREEN - Swab, Axilla Right, Axilla Left and Groin [[id number]] Collected: 01/23/24 1455    Specimen: Swab from Axilla Right, Axilla Left and Groin Updated: 01/23/24 1509    Urine Culture - Urine, Urine, Clean Catch [[id number]]  (Abnormal) Collected: 01/21/24 2140     Specimen: Urine, Clean Catch Updated: 01/23/24 0953     Urine Culture >100,000 CFU/mL Gram Negative Bacilli    Narrative:      Colonization of the urinary tract without infection is common. Treatment is discouraged unless the patient is symptomatic, pregnant, or undergoing an invasive urologic procedure.    Uric Acid [[id number]]  (Normal) Collected: 01/23/24 0418    Specimen: Blood Updated: 01/23/24 0911     Uric Acid 5.9 mg/dL     Manual Differential [[id number]]  (Abnormal) Collected: 01/23/24 0458    Specimen: Blood Updated: 01/23/24 0551     Neutrophil % 94.0 %      Lymphocyte % 3.0 %      Monocyte % 3.0 %      Neutrophils Absolute 20.40 10*3/mm3      Lymphocytes Absolute 0.65 10*3/mm3      Monocytes Absolute 0.65 10*3/mm3      Anisocytosis Slight/1+     Dacrocytes Slight/1+     Elliptocytes Mod/2+     Macrocytes Slight/1+     Ovalocytes Mod/2+     Poikilocytes Large/3+     Polychromasia Large/3+     WBC Morphology Normal     Platelet Morphology Normal    Magnesium [[id number]]  (Abnormal) Collected: 01/23/24 0418    Specimen: Blood Updated: 01/23/24 0525     Magnesium 2.5 mg/dL     Basic Metabolic Panel [[id number]]  (Abnormal) Collected: 01/23/24 0418    Specimen: Blood Updated: 01/23/24 0525     Glucose 96 mg/dL      BUN 45 mg/dL      Creatinine 2.15 mg/dL      Sodium 139 mmol/L      Potassium 4.7 mmol/L      Chloride 105 mmol/L      CO2 20.6 mmol/L      Calcium 8.0 mg/dL      BUN/Creatinine Ratio 20.9     Anion Gap 13.4 mmol/L      eGFR 30.6 mL/min/1.73     Narrative:      GFR Normal >60  Chronic Kidney Disease <60  Kidney Failure <15    The GFR formula is only valid for adults with stable renal function between ages 18 and 70.    Hemoglobin A1c [[id number]]  (Abnormal) Collected: 01/23/24 0418    Specimen: Blood Updated: 01/23/24 0516     Hemoglobin A1C 5.80 %     Narrative:      Hemoglobin A1C Ranges:    Increased Risk for Diabetes  5.7% to 6.4%  Diabetes                     >= 6.5%  Diabetic Goal                 < 7.0%    CBC & Differential [[id number]]  (Abnormal) Collected: 01/23/24 0458    Specimen: Blood Updated: 01/23/24 0514    Narrative:      The following orders were created for panel order CBC & Differential.  Procedure                               Abnormality         Status                     ---------                               -----------         ------                     CBC Auto Differential[[id number]]        Abnormal            Final result                 Please view results for these tests on the individual orders.    CBC Auto Differential [[id number]]  (Abnormal) Collected: 01/23/24 0458    Specimen: Blood Updated: 01/23/24 0514     WBC 21.70 10*3/mm3      RBC 3.79 10*6/mm3      Hemoglobin 11.8 g/dL      Hematocrit 34.6 %      MCV 91.3 fL      MCH 31.1 pg      MCHC 34.1 g/dL      RDW 13.0 %      RDW-SD 42.6 fl      MPV 8.8 fL      Platelets 262 10*3/mm3      nRBC 0.0 /100 WBC     Basic Metabolic Panel [[id number]]  (Abnormal) Collected: 01/22/24 0437    Specimen: Blood Updated: 01/22/24 0514     Glucose 108 mg/dL      BUN 30 mg/dL      Creatinine 1.58 mg/dL      Sodium 138 mmol/L      Potassium 4.5 mmol/L      Chloride 101 mmol/L      CO2 23.4 mmol/L      Calcium 8.8 mg/dL      BUN/Creatinine Ratio 19.0     Anion Gap 13.6 mmol/L      eGFR 44.2 mL/min/1.73     Narrative:      GFR Normal >60  Chronic Kidney Disease <60  Kidney Failure <15    The GFR formula is only valid for adults with stable renal function between ages 18 and 70.    CBC (No Diff) [[id number]]  (Abnormal) Collected: 01/22/24 0437    Specimen: Blood Updated: 01/22/24 0457     WBC 15.81 10*3/mm3      RBC 4.43 10*6/mm3      Hemoglobin 13.1 g/dL      Hematocrit 39.8 %      MCV 89.8 fL      MCH 29.6 pg      MCHC 32.9 g/dL      RDW 12.8 %      RDW-SD 41.6 fl      MPV 8.5 fL      Platelets 350 10*3/mm3     Urinalysis, Microscopic Only - Urine, Clean Catch [[id number]]  (Abnormal) Collected: 01/21/24 2140    Specimen: Urine, Clean Catch Updated:  01/21/24 2218     RBC, UA       Unable to determine due to loaded field     /HPF     WBC, UA Too Numerous to Count /HPF      Bacteria, UA       Unable to determine due to loaded field     /HPF     Squamous Epithelial Cells, UA       Unable to determine due to loaded field     /HPF     Hyaline Casts, UA       Unable to determine due to loaded field     /LPF     Methodology Manual Light Microscopy    Urinalysis With Culture If Indicated - Urine, Clean Catch [[id number]]  (Abnormal) Collected: 01/21/24 2140    Specimen: Urine, Clean Catch Updated: 01/21/24 2211     Color, UA Dark Yellow     Appearance, UA Turbid     pH, UA 5.5     Specific Gravity, UA 1.021     Glucose, UA Negative     Ketones, UA Negative     Bilirubin, UA Negative     Blood, UA Large (3+)     Protein, UA >=300 mg/dL (3+)     Leuk Esterase, UA Large (3+)     Nitrite, UA Positive     Urobilinogen, UA 1.0 E.U./dL    Narrative:      In absence of clinical symptoms, the presence of pyuria, bacteria, and/or nitrites on the urinalysis result does not correlate with infection.    Lactic Acid, Plasma [[id number]]  (Normal) Collected: 01/21/24 2135    Specimen: Blood Updated: 01/21/24 2203     Lactate 1.8 mmol/L     CBC & Differential [[id number]]  (Abnormal) Collected: 01/21/24 1852    Specimen: Blood Updated: 01/21/24 1930    Narrative:      The following orders were created for panel order CBC & Differential.  Procedure                               Abnormality         Status                     ---------                               -----------         ------                     CBC Auto Differential[[id number]]        Abnormal            Final result                 Please view results for these tests on the individual orders.    CBC Auto Differential [[id number]]  (Abnormal) Collected: 01/21/24 1852    Specimen: Blood Updated: 01/21/24 1930     WBC 17.86 10*3/mm3      RBC 4.16 10*6/mm3      Hemoglobin 12.3 g/dL      Hematocrit 38.3 %      MCV 92.1 fL       MCH 29.6 pg      MCHC 32.1 g/dL      RDW 13.0 %      RDW-SD 42.9 fl      MPV 9.2 fL      Platelets 372 10*3/mm3     Manual Differential [[id number]]  (Abnormal) Collected: 01/21/24 1852    Specimen: Blood Updated: 01/21/24 1930     Neutrophil % 90.0 %      Lymphocyte % 5.0 %      Monocyte % 5.0 %      Neutrophils Absolute 16.07 10*3/mm3      Lymphocytes Absolute 0.89 10*3/mm3      Monocytes Absolute 0.89 10*3/mm3      RBC Morphology Normal     WBC Morphology Normal     Platelet Morphology Normal    Comprehensive Metabolic Panel [[id number]]  (Abnormal) Collected: 01/21/24 1852    Specimen: Blood Updated: 01/21/24 1926     Glucose 123 mg/dL      BUN 27 mg/dL      Creatinine 1.79 mg/dL      Sodium 139 mmol/L      Potassium 4.7 mmol/L      Comment: Slight hemolysis detected by analyzer. Result may be falsely elevated.        Chloride 100 mmol/L      CO2 26.0 mmol/L      Calcium 9.5 mg/dL      Total Protein 7.9 g/dL      Albumin 3.3 g/dL      ALT (SGPT) 22 U/L      AST (SGOT) 47 U/L      Comment: Slight hemolysis detected by analyzer. Result may be falsely elevated.        Alkaline Phosphatase 80 U/L      Total Bilirubin 0.5 mg/dL      Globulin 4.6 gm/dL      A/G Ratio 0.7 g/dL      BUN/Creatinine Ratio 15.1     Anion Gap 13.0 mmol/L      eGFR 38.1 mL/min/1.73     Narrative:      GFR Normal >60  Chronic Kidney Disease <60  Kidney Failure <15    The GFR formula is only valid for adults with stable renal function between ages 18 and 70.            Imaging Results (All)       Procedure Component Value Units Date/Time    CT Abdomen Pelvis Without Contrast [[id number]] Collected: 01/23/24 0959     Updated: 01/23/24 1005    Narrative:      CT ABDOMEN AND PELVIS WITHOUT IV CONTRAST     HISTORY: Pyelonephritis versus perinephric abscess.     TECHNIQUE:  CT includes axial imaging from the lung bases to the  trochanters without intravenous contrast and without use of oral  contrast. Data reconstructed in coronal and sagittal planes.  Radiation  dose reduction techniques were utilized, including automated exposure  control and exposure modulation based on body size.     COMPARISON: CT abdomen and pelvis 12/20/2023.     FINDINGS: There is mild linear subsegmental basal atelectasis. Liver,  spleen, pancreas appear grossly normal allowing for limitation without  IV contrast and due to streak artifact from patient being scanned with  his arms to his side. There is mild bilateral adrenal thickening,  greater on the left without change.     A right lower pole renal exophytic low-density lesion is most likely a  cyst and measures 5.6 cm and this was also present on the previous exam  12/20/2023. There is very mild hydronephrosis that is greater on the  right and new when compared to the prior exam. Mild hydroureter is  present to the level of the urinary bladder. There is general bladder  distention with generalized urinary bladder wall thickening consistent  with cystitis. [place] anterior bladder dome diverticulum is present  just posterior to the level of umbilicus. Pericystic stranding is  present.     There is no bowel dilatation or evidence for bowel obstruction. Small  periumbilical hernia contains fat.     There is syndesmophyte formation within the lower thoracic spine and  lumbar spine with bony fusion of the vertebral bodies. There is also  bony fusion of the spinous processes, facet joints and there is  ankylosis of the sacroiliac joints.     Left total hip arthroplasty is present associated with beam-hardening  artifact. There is mild generalized body wall edema.        Impression:      1. Generalized urinary bladder wall distention with wall thickening  consistent with cystitis and urinary retention. Anterior bladder dome  diverticulum. Mild hydronephrosis, greater on the right, potential  pyelonephritis.  2. 5.6 cm right lower pole exophytic low-density lesion is consistent  with a cyst and is without change.  3. Mild generalized body  wall edema.  4. Small periumbilical hernia contains fat.  5. Osseous findings suggest ankylosing spondylitis.     Radiation dose reduction techniques were utilized, including automated  exposure control and exposure modulation based on body size.        This report was finalized on 1/23/2024 10:02 AM by Dr. Bobby Eason M.D on Workstation: [id number]       XR Chest 1 View [[id number]] Collected: 01/21/24 1859     Updated: 01/21/24 1902    Narrative:      PORTABLE CHEST X-RAY     HISTORY: Altered mental status.     Portable chest x-ray is provided. Correlation: December 20, 2023 and  June 21, 2017.     FINDINGS: The cardiomediastinal silhouette is normal. The lungs are  clear. The costophrenic sulci are dry and the bones appear normal. There  is no pneumothorax.       Impression:      Negative.     This report was finalized on 1/21/2024 6:59 PM by Dr. Walter Henderson M.D on Workstation: [id number]       CT Head Without Contrast [[id number]] Collected: 01/21/24 1853     Updated: 01/21/24 1859    Narrative:      HEAD CT     HISTORY: Confusion.     TECHNIQUE: Noncontrast head CT is provided. Comparison: Head CT December 20, 2023.     FINDINGS: The ventricles are normal in caliber. There is loss of  cerebral parenchymal volume with patchy low density in the frontal white  matter similar as observed previously. No new parenchymal abnormality is  present. There is no hemorrhage or acute ischemic change. The bones of  the skull appear normal. The mastoid air cells, middle ears, and  visualized paranasal sinuses are clear. Extensive atheromatous vascular  calcification       Impression:      Chronic changes in the brain. No acute abnormality.        Radiation dose reduction techniques were utilized, including automated  exposure control and exposure modulation based on body size.        This report was finalized on 1/21/2024 6:56 PM by Dr. Walter Henderson M.D on Workstation: [id number]               Medication Review:    Current Facility-Administered Medications   Medication Dose Route Frequency Provider Last Rate Last Admin    acetaminophen (TYLENOL) tablet 650 mg  650 mg Oral Q4H PRN Casie Reardon MD   650 mg at 01/23/24 2017    atenolol (TENORMIN) tablet 50 mg  50 mg Oral Q24H Casie Reardon MD   50 mg at 01/23/24 0849    sennosides-docusate (PERICOLACE) 8.6-50 MG per tablet 2 tablet  2 tablet Oral BID Casie Reardon MD   2 tablet at 01/23/24 2017    And    polyethylene glycol (MIRALAX) packet 17 g  17 g Oral Daily PRN Casie Reardon MD        And    bisacodyl (DULCOLAX) EC tablet 5 mg  5 mg Oral Daily PRN Casie Reardon MD        And    bisacodyl (DULCOLAX) suppository 10 mg  10 mg Rectal Daily PRN Casie Reardon MD        Calcium Replacement - Follow Nurse / BPA Driven Protocol   Does not apply PRN Gian Chung DO        cefTRIAXone (ROCEPHIN) 2,000 mg in sodium chloride 0.9 % 100 mL IVPB-VTB  2,000 mg Intravenous Q24H Casie Reardon  mL/hr at 01/23/24 2017 2,000 mg at 01/23/24 2017    Enoxaparin Sodium (LOVENOX) syringe 30 mg  30 mg Subcutaneous Q24H Shola Haque MD        Magnesium Standard Dose Replacement - Follow Nurse / BPA Driven Protocol   Does not apply PRGian Krishnamurthy DO        melatonin tablet 3 mg  3 mg Oral Nightly PRN Casie Reardon MD   3 mg at 01/23/24 2017    ondansetron ODT (ZOFRAN-ODT) disintegrating tablet 4 mg  4 mg Oral Q6H PRN Casie Reardon MD        Or    ondansetron (ZOFRAN) injection 4 mg  4 mg Intravenous Q6H JULITAN Casie Reardon MD        pantoprazole (PROTONIX) EC tablet 40 mg  40 mg Oral Q AM Casie Reardon MD   40 mg at 01/24/24 0524    Phosphorus Replacement - Follow Nurse / BPA Driven Protocol   Does not apply PRN Bartholomew, Gian, DO        Potassium Replacement - Follow Nurse / BPA Driven Protocol   Does not apply PRN Gian Chung DO        sodium chloride 0.9 % flush 10 mL  10 mL  Intravenous PRN Jeffrey Jean PA        sodium chloride 0.9 % infusion  100 mL/hr Intravenous Continuous Casie Reardon  mL/hr at 01/23/24 0542 100 mL/hr at 01/23/24 0542    tamsulosin (FLOMAX) 24 hr capsule 0.4 mg  0.4 mg Oral Nightly Casie Reardon MD   0.4 mg at 01/23/24 2017       Current Facility-Administered Medications:     acetaminophen (TYLENOL) tablet 650 mg, 650 mg, Oral, Q4H PRN, Casie Reardon MD, 650 mg at 01/23/24 2017    atenolol (TENORMIN) tablet 50 mg, 50 mg, Oral, Q24H, Casie Reardon MD, 50 mg at 01/23/24 0849    sennosides-docusate (PERICOLACE) 8.6-50 MG per tablet 2 tablet, 2 tablet, Oral, BID, 2 tablet at 01/23/24 2017 **AND** polyethylene glycol (MIRALAX) packet 17 g, 17 g, Oral, Daily PRN **AND** bisacodyl (DULCOLAX) EC tablet 5 mg, 5 mg, Oral, Daily PRN **AND** bisacodyl (DULCOLAX) suppository 10 mg, 10 mg, Rectal, Daily PRN, Casie Reardon MD    Calcium Replacement - Follow Nurse / BPA Driven Protocol, , Does not apply, PRN, Gian Chung DO    cefTRIAXone (ROCEPHIN) 2,000 mg in sodium chloride 0.9 % 100 mL IVPB-VTB, 2,000 mg, Intravenous, Q24H, Casie Reardon MD, Last Rate: 200 mL/hr at 01/23/24 2017, 2,000 mg at 01/23/24 2017    Enoxaparin Sodium (LOVENOX) syringe 30 mg, 30 mg, Subcutaneous, Q24H, Shola Haque MD    Magnesium Standard Dose Replacement - Follow Nurse / BPA Driven Protocol, , Does not apply, PRN, Gian Chung DO    melatonin tablet 3 mg, 3 mg, Oral, Nightly PRN, Casie Reardon MD, 3 mg at 01/23/24 2017    ondansetron ODT (ZOFRAN-ODT) disintegrating tablet 4 mg, 4 mg, Oral, Q6H PRN **OR** ondansetron (ZOFRAN) injection 4 mg, 4 mg, Intravenous, Q6H PRN, Casie Reardon MD    pantoprazole (PROTONIX) EC tablet 40 mg, 40 mg, Oral, Q AM, Casie Reardon MD, 40 mg at 01/24/24 0524    Phosphorus Replacement - Follow Nurse / BPA Driven Protocol, , Does not apply, PRN, Gian Chung, DO    Potassium  Replacement - Follow Nurse / BPA Driven Protocol, , Does not apply, PRN, Gian Chung, DO    Insert Peripheral IV, , , Once **AND** sodium chloride 0.9 % flush 10 mL, 10 mL, Intravenous, PRN, Jeffrey Jean PA    sodium chloride 0.9 % infusion, 100 mL/hr, Intravenous, Continuous, Casie Reardon MD, Last Rate: 100 mL/hr at 01/23/24 0542, 100 mL/hr at 01/23/24 0542    tamsulosin (FLOMAX) 24 hr capsule 0.4 mg, 0.4 mg, Oral, Nightly, Casie Reardon MD, 0.4 mg at 01/23/24 2017  Medications Discontinued During This Encounter   Medication Reason    pantoprazole (PROTONIX) EC tablet 40 mg     Pharmacy to Dose enoxaparin (LOVENOX)     Enoxaparin Sodium (LOVENOX) syringe 40 mg        Assessment & Plan   Acute kidney injury      Transient alteration of awareness    Hypertension    DISH (disseminated idiopathic skeletal hyperostosis)    Generalized weakness    Altered mental status    GERD without esophagitis    BPH (benign prostatic hyperplasia)    Leukocytosis    UTI (urinary tract infection)    Dehydration    MARTITA (acute kidney injury)    Hyperglycemia    Altered mental state    -Creatinine downtrending which is reassuring    Plan   -Continue indwelling Mariscal catheter  - Recommend renal ultrasound tomorrow to ensure improvement in hydronephrosis  - Will plan to follow-up on Friday    Walter Jimenez Jr., MD  01/24/24  08:26 EST

## 2024-01-24 NOTE — PLAN OF CARE
Goal Outcome Evaluation:  Plan of Care Reviewed With: patient           Outcome Evaluation: Patient is admitted to [facility] for AMS on 1/21/2024. PMHx includes HTN, arthritis, and ankylosing spondylitis. Pt was recently admitted to [facility] in December for a fall, prior to that admit was living at home alone. Pt was discharged to SNF where he has been since last hospital stay. Patient has very slow ranjit, and moves/talks slowly throughout visit today. Patient performed bed mobility with max A x2, able to sit up at EOB with CGA. Patient performed sit to stand x2 with max Ax2, but once able to stand supported with walker, able to remain standing with CGA/min A. Patient will continue to benefit from skilled OT to address current functional deficits, anticipate need for SNF at [facility].      Anticipated Discharge Disposition (OT): skilled nursing facility

## 2024-01-24 NOTE — PLAN OF CARE
Goal Outcome Evaluation:  Plan of Care Reviewed With: patient           Outcome Evaluation: Pt agreeable to therapy, moves very slowly and c/o feeling stiff. Patient peformed bed mobility wtih maxAx2, sat up on EOB with CGA/Chuckie until balanced on EOB, once positioned on EOb could sit with SBA and maintain sitting balance. patient performed LE ROm exercises with limited ROM, cues for technique. Patient able to stand x 3 trials at the bedside with rwx and mod/maxAx2, able to achieve full upright standing on all 3 attempts, took some sidesteps up to head of bed with rwx and minAx2. Pt demonstrates improvement today in standing. Recommend SNU at d/c.      Anticipated Discharge Disposition (PT): skilled nursing facility

## 2024-01-25 ENCOUNTER — APPOINTMENT (OUTPATIENT)
Dept: ULTRASOUND IMAGING | Facility: HOSPITAL | Age: 79
DRG: 690 | End: 2024-01-25
Payer: MEDICARE

## 2024-01-25 LAB
ALBUMIN SERPL-MCNC: 2.3 G/DL (ref 3.5–5.2)
ANION GAP SERPL CALCULATED.3IONS-SCNC: 10 MMOL/L (ref 5–15)
BASOPHILS # BLD AUTO: 0.05 10*3/MM3 (ref 0–0.2)
BASOPHILS NFR BLD AUTO: 0.2 % (ref 0–1.5)
BUN SERPL-MCNC: 35 MG/DL (ref 8–23)
BUN/CREAT SERPL: 20.7 (ref 7–25)
CALCIUM SPEC-SCNC: 7.4 MG/DL (ref 8.6–10.5)
CHLORIDE SERPL-SCNC: 109 MMOL/L (ref 98–107)
CO2 SERPL-SCNC: 20 MMOL/L (ref 22–29)
CREAT SERPL-MCNC: 1.69 MG/DL (ref 0.76–1.27)
DEPRECATED RDW RBC AUTO: 42.2 FL (ref 37–54)
EGFRCR SERPLBLD CKD-EPI 2021: 40.8 ML/MIN/1.73
EOSINOPHIL # BLD AUTO: 0.06 10*3/MM3 (ref 0–0.4)
EOSINOPHIL NFR BLD AUTO: 0.3 % (ref 0.3–6.2)
ERYTHROCYTE [DISTWIDTH] IN BLOOD BY AUTOMATED COUNT: 13.1 % (ref 12.3–15.4)
GLUCOSE SERPL-MCNC: 88 MG/DL (ref 65–99)
HCT VFR BLD AUTO: 31.4 % (ref 37.5–51)
HGB BLD-MCNC: 10.9 G/DL (ref 13–17.7)
IMM GRANULOCYTES # BLD AUTO: 0.53 10*3/MM3 (ref 0–0.05)
IMM GRANULOCYTES NFR BLD AUTO: 2.2 % (ref 0–0.5)
LYMPHOCYTES # BLD AUTO: 0.84 10*3/MM3 (ref 0.7–3.1)
LYMPHOCYTES NFR BLD AUTO: 3.5 % (ref 19.6–45.3)
MAGNESIUM SERPL-MCNC: 2.2 MG/DL (ref 1.6–2.4)
MCH RBC QN AUTO: 30.9 PG (ref 26.6–33)
MCHC RBC AUTO-ENTMCNC: 34.7 G/DL (ref 31.5–35.7)
MCV RBC AUTO: 89 FL (ref 79–97)
MONOCYTES # BLD AUTO: 2.19 10*3/MM3 (ref 0.1–0.9)
MONOCYTES NFR BLD AUTO: 9.1 % (ref 5–12)
NEUTROPHILS NFR BLD AUTO: 20.31 10*3/MM3 (ref 1.7–7)
NEUTROPHILS NFR BLD AUTO: 84.7 % (ref 42.7–76)
NRBC BLD AUTO-RTO: 0 /100 WBC (ref 0–0.2)
PHOSPHATE SERPL-MCNC: 2.6 MG/DL (ref 2.5–4.5)
PLATELET # BLD AUTO: 253 10*3/MM3 (ref 140–450)
PMV BLD AUTO: 9.2 FL (ref 6–12)
POTASSIUM SERPL-SCNC: 3.7 MMOL/L (ref 3.5–5.2)
RBC # BLD AUTO: 3.53 10*6/MM3 (ref 4.14–5.8)
SODIUM SERPL-SCNC: 139 MMOL/L (ref 136–145)
WBC NRBC COR # BLD AUTO: 23.98 10*3/MM3 (ref 3.4–10.8)

## 2024-01-25 PROCEDURE — 25010000002 ENOXAPARIN PER 10 MG: Performed by: HOSPITALIST

## 2024-01-25 PROCEDURE — 85025 COMPLETE CBC W/AUTO DIFF WBC: CPT | Performed by: STUDENT IN AN ORGANIZED HEALTH CARE EDUCATION/TRAINING PROGRAM

## 2024-01-25 PROCEDURE — 76775 US EXAM ABDO BACK WALL LIM: CPT

## 2024-01-25 PROCEDURE — 83735 ASSAY OF MAGNESIUM: CPT | Performed by: STUDENT IN AN ORGANIZED HEALTH CARE EDUCATION/TRAINING PROGRAM

## 2024-01-25 PROCEDURE — 25810000003 SODIUM CHLORIDE 0.9 % SOLUTION: Performed by: INTERNAL MEDICINE

## 2024-01-25 PROCEDURE — 80069 RENAL FUNCTION PANEL: CPT | Performed by: INTERNAL MEDICINE

## 2024-01-25 PROCEDURE — 25010000002 CEFTRIAXONE PER 250 MG: Performed by: STUDENT IN AN ORGANIZED HEALTH CARE EDUCATION/TRAINING PROGRAM

## 2024-01-25 PROCEDURE — 97530 THERAPEUTIC ACTIVITIES: CPT

## 2024-01-25 RX ADMIN — ENOXAPARIN SODIUM 30 MG: 100 INJECTION SUBCUTANEOUS at 08:21

## 2024-01-25 RX ADMIN — SENNOSIDES AND DOCUSATE SODIUM 2 TABLET: 50; 8.6 TABLET ORAL at 08:23

## 2024-01-25 RX ADMIN — SENNOSIDES AND DOCUSATE SODIUM 2 TABLET: 50; 8.6 TABLET ORAL at 20:41

## 2024-01-25 RX ADMIN — SODIUM CHLORIDE 100 ML/HR: 9 INJECTION, SOLUTION INTRAVENOUS at 16:09

## 2024-01-25 RX ADMIN — CEFTRIAXONE 2000 MG: 2 INJECTION, POWDER, FOR SOLUTION INTRAMUSCULAR; INTRAVENOUS at 20:41

## 2024-01-25 RX ADMIN — TAMSULOSIN HYDROCHLORIDE 0.4 MG: 0.4 CAPSULE ORAL at 20:41

## 2024-01-25 RX ADMIN — PANTOPRAZOLE SODIUM 40 MG: 40 TABLET, DELAYED RELEASE ORAL at 05:20

## 2024-01-25 RX ADMIN — SODIUM CHLORIDE 100 ML/HR: 9 INJECTION, SOLUTION INTRAVENOUS at 05:20

## 2024-01-25 NOTE — THERAPY TREATMENT NOTE
Patient Name: Anthony Gallegos  : 1945    MRN: [id number]                              Today's Date: 2024       Admit Date: 2024    Visit Dx:     ICD-10-CM ICD-9-CM   1. Altered mental status, unspecified altered mental status type  R41.82 780.97   2. Leukocytosis, unspecified type  D72.829 288.60     Patient Active Problem List   Diagnosis    Ankylosing spondylitis of cervical region    Recent unexplained weight loss    Abnormal serum lipase level    Abnormal serum level of amylase    Hypertension    Boil    Immobility    Fall from bed    Tetrahydrocannabinol (THC) use disorder, mild, abuse    Generalized pain        Grief    DISH (disseminated idiopathic skeletal hyperostosis)    Generalized weakness    Severe malnutrition    Altered mental status    Transient alteration of awareness    GERD without esophagitis    BPH (benign prostatic hyperplasia)    Leukocytosis    UTI (urinary tract infection)    Dehydration    MARTITA (acute kidney injury)    Hyperglycemia    Altered mental state     Past Medical History:   Diagnosis Date    Abscess of scrotum     Arthritis     AS (ankylosing spondylitis)     Hypertension     Tetrahydrocannabinol (THC) use disorder, mild, abuse 2023    Uveitis      Past Surgical History:   Procedure Laterality Date    COLONOSCOPY      ROTATOR CUFF REPAIR Right     TOTAL HIP ARTHROPLASTY Left       General Information       Row Name 24 1140          Physical Therapy Time and Intention    Document Type therapy note (daily note)  -DJ     Mode of Treatment individual therapy;physical therapy  -DJ       Row Name 24 1140          General Information    Patient Profile Reviewed yes  -DJ     Existing Precautions/Restrictions fall  -DJ       Row Name 24 1140          Cognition    Orientation Status (Cognition) oriented x 3  -DJ       Row Name 24 1140          Safety Issues, Functional Mobility    Comment, Safety Issues/Impairments (Mobility) gt belt,  nonskid socks  -DJ               User Key  (r) = Recorded By, (t) = Taken By, (c) = Cosigned By      Initials Name Provider Type    Roxi Stark, PT Physical Therapist                   Mobility       Row Name 01/25/24 1141          Bed Mobility    Bed Mobility supine-sit  -DJ     Supine-Sit Heard (Bed Mobility) moderate assist (50% patient effort);maximum assist (25% patient effort);2 person assist  -DJ     Assistive Device (Bed Mobility) head of bed elevated;bed rails  -DJ     Comment, (Bed Mobility) moves slowly, strong post lean  -DJ       Row Name 01/25/24 1141          Transfers    Comment, (Transfers) sit/stand from EOB  -DJ       Row Name 01/25/24 1141          Bed-Chair Transfer    Bed-Chair Heard (Transfers) moderate assist (50% patient effort);2 person assist;verbal cues  -DJ     Assistive Device (Bed-Chair Transfers) walker, front-wheeled  -DJ       Row Name 01/25/24 1141          Sit-Stand Transfer    Sit-Stand Heard (Transfers) moderate assist (50% patient effort);2 person assist;verbal cues  -DJ     Assistive Device (Sit-Stand Transfers) walker, front-wheeled  -DJ     Comment, (Sit-Stand Transfer) post LOB with initial standing  -DJ       Row Name 01/25/24 1141          Gait/Stairs (Locomotion)    Heard Level (Gait) moderate assist (50% patient effort);2 person assist;verbal cues  -DJ     Assistive Device (Gait) walker, front-wheeled  -DJ     Distance in Feet (Gait) 5'  -DJ     Deviations/Abnormal Patterns (Gait) festinating/shuffling;stride length decreased;gait speed decreased;ranjit decreased;base of support, narrow  -DJ     Bilateral Gait Deviations forward flexed posture;heel strike decreased  -DJ     Heard Level (Stairs) not tested  -DJ     Comment, (Gait/Stairs) Pt amb 3-5' from bed to chair with mod A of 2 using r wx; shuffled steps, flexed posture, poor endurance, unsteady balance  -DJ               User Key  (r) = Recorded By, (t) = Taken By, (c) =  Cosigned By      Initials Name Provider Type    Roxi Stark, GERI Physical Therapist                   Obj/Interventions       Row Name 01/25/24 1144          Motor Skills    Motor Skills functional endurance  -DJ     Functional Endurance poor  -DJ     Therapeutic Exercise other (see comments)  MIP  -DJ       Row Name 01/25/24 1144          Balance    Balance Assessment standing static balance;standing dynamic balance  -DJ     Static Standing Balance moderate assist;2-person assist;verbal cues  -DJ     Dynamic Standing Balance moderate assist;2-person assist;verbal cues  -DJ     Position/Device Used, Standing Balance walker, front-wheeled;supported  -DJ     Balance Interventions sitting;standing;sit to stand;supported;weight shifting activity  -DJ     Comment, Balance unsteady  -DJ               User Key  (r) = Recorded By, (t) = Taken By, (c) = Cosigned By      Initials Name Provider Type    Roxi Stark, GERI Physical Therapist                   Goals/Plan    No documentation.                  Clinical Impression       Row Name 01/25/24 1146          Pain    Pretreatment Pain Rating 0/10 - no pain  -DJ       Row Name 01/25/24 1146          Plan of Care Review    Plan of Care Reviewed With patient  -LOUIS     Progress no change  -DJ     Outcome Evaluation Pt resting in bed, struggling to feed himself breakfast. Pt agreeable to participate in therapy. He req mod-max A of 2 for bed mobility to sit EOB and dem strong post lean initially but was able to progress to sitting with CGA. Pt stood from EOB with mod A Of 2 using r wx. Pt amb 3-5' from bed to chair with mod A of 2 using r wx; shuffled steps, flexed posture, poor endurance, unsteady balance. Pt placed in chair with all needs met. Pt progressing slowly and limited by poor endurance. Cont PT to address functinal deficits and prepare for d/c. Recommend SNF.  -DJ       Row Name 01/25/24 1146          Therapy Assessment/Plan (PT)    Criteria for Skilled  Interventions Met (PT) skilled treatment is necessary  -DJ       Row Name 01/25/24 1146          Vital Signs    Pre Patient Position Supine  -DJ     Intra Patient Position Standing  -DJ     Post Patient Position Sitting  -DJ       Row Name 01/25/24 1146          Positioning and Restraints    Pre-Treatment Position in bed  -DJ     Post Treatment Position chair  -DJ     In Chair notified nsg;reclined;call light within reach;encouraged to call for assist;exit alarm on  -DJ               User Key  (r) = Recorded By, (t) = Taken By, (c) = Cosigned By      Initials Name Provider Type    Roxi Stark, GERI Physical Therapist                   Outcome Measures       Row Name 01/25/24 1150          How much help from another person do you currently need...    Turning from your back to your side while in flat bed without using bedrails? 2  -DJ     Moving from lying on back to sitting on the side of a flat bed without bedrails? 2  -DJ     Moving to and from a bed to a chair (including a wheelchair)? 2  -DJ     Standing up from a chair using your arms (e.g., wheelchair, bedside chair)? 2  -DJ     Climbing 3-5 steps with a railing? 1  -DJ     To walk in hospital room? 2  -DJ     AM-PAC 6 Clicks Score (PT) 11  -DJ     Highest Level of Mobility Goal 4 --> Transfer to chair/commode  -DJ       Row Name 01/25/24 1150          Functional Assessment    Outcome Measure Options AM-PAC 6 Clicks Basic Mobility (PT)  -DJ               User Key  (r) = Recorded By, (t) = Taken By, (c) = Cosigned By      Initials Name Provider Type    Roxi Stark, GERI Physical Therapist                                 Physical Therapy Education       Title: PT OT SLP Therapies (Done)       Topic: Physical Therapy (Done)       Point: Mobility training (Done)       Learning Progress Summary             Patient Acceptance, E, VU,NR by LOUIS at 1/25/2024 1151    Acceptance, E, VU by GEETA at 1/24/2024 1404    Acceptance, E,D, VU,NR by AMELIA at 1/23/2024 5233     Acceptance, E, VU,NR by DB at 1/22/2024 1427                         Point: Home exercise program (Done)       Learning Progress Summary             Patient Acceptance, E, VU,NR by DJ at 1/25/2024 1151    Acceptance, E, VU by EM at 1/24/2024 1404    Acceptance, E,D, VU,NR by AMELIA at 1/23/2024 1744    Acceptance, E, VU,NR by DB at 1/22/2024 1427                         Point: Body mechanics (Done)       Learning Progress Summary             Patient Acceptance, E, VU,NR by DJ at 1/25/2024 1151    Acceptance, E,D, VU,NR by AMELIA at 1/23/2024 1744    Acceptance, E, VU,NR by DB at 1/22/2024 1427                         Point: Precautions (Done)       Learning Progress Summary             Patient Acceptance, E, VU,NR by LOUIS at 1/25/2024 1151    Acceptance, E,D, VU,NR by AMELIA at 1/23/2024 1744    Acceptance, E, VU,NR by DB at 1/22/2024 1427                                         User Key       Initials Effective Dates Name Provider Type Discipline    EM 06/16/21 -  Cierra Valdivia, PT Physical Therapist PT     03/07/18 -  Rosa Portillo, TULIO Physical Therapist Assistant PT     06/16/21 -  Felipa Weathers, PT Physical Therapist PT     10/25/19 -  Roxi Schofield, PT Physical Therapist PT                  PT Recommendation and Plan     Plan of Care Reviewed With: patient  Progress: no change  Outcome Evaluation: Pt resting in bed, struggling to feed himself breakfast. Pt agreeable to participate in therapy. He req mod-max A of 2 for bed mobility to sit EOB and dem strong post lean initially but was able to progress to sitting with CGA. Pt stood from EOB with mod A Of 2 using r wx. Pt amb 3-5' from bed to chair with mod A of 2 using r wx; shuffled steps, flexed posture, poor endurance, unsteady balance. Pt placed in chair with all needs met. Pt progressing slowly and limited by poor endurance. Cont PT to address functinal deficits and prepare for d/c. Recommend SNF.     Time Calculation:         PT Charges       Row Name  01/25/24 1153             Time Calculation    Start Time 0846  -DJ      Stop Time 0912  -DJ      Time Calculation (min) 26 min  -DJ      PT Non-Billable Time (min) 10 min  -DJ      PT Received On 01/25/24  -DJ      PT - Next Appointment 01/26/24  -DJ                User Key  (r) = Recorded By, (t) = Taken By, (c) = Cosigned By      Initials Name Provider Type    Roxi Stark, PT Physical Therapist                  Therapy Charges for Today       Code Description Service Date Service Provider Modifiers Qty    00304008192 HC PT THERAPEUTIC ACT EA 15 MIN 1/25/2024 Roxi Schofield, PT GP 2    27770656268 HC PT THER SUPP EA 15 MIN 1/25/2024 Roxi Schofield, PT GP 2            PT G-Codes  Outcome Measure Options: AM-PAC 6 Clicks Basic Mobility (PT)  AM-PAC 6 Clicks Score (PT): 11  AM-PAC 6 Clicks Score (OT): 10  Modified Grayson Scale: 4 - Moderately severe disability.  Unable to walk without assistance, and unable to attend to own bodily needs without assistance.  PT Discharge Summary  Anticipated Discharge Disposition (PT): skilled nursing facility    Roxi Schofield PT  1/25/2024

## 2024-01-25 NOTE — PROGRESS NOTES
[facility] Progress Note      Patient Name: Anthony Gallegos  : 1945  MRN: [id number]  Primary Care Physician:  Provider, No Known  Date of admission: 2024    Subjective     Interval History:   Follow-up acute kidney injury.  Coudé catheter in place. retention.  Urine output 2.2 L.  Eating better.  Bowels moving.  Review of Systems:   As noted above    Objective     Vitals:   Temp:  [98.8 °F (37.1 °C)-101.1 °F (38.4 °C)] 98.8 °F (37.1 °C)  Heart Rate:  [74-85] 81  Resp:  [18] 18  BP: (124-142)/(52-72) 124/52    Intake/Output Summary (Last 24 hours) at 2024 1608  Last data filed at 2024 0900  Gross per 24 hour   Intake 1454.67 ml   Output 1520 ml   Net -65.33 ml       Physical Exam:    General Appearance: alert, more conversant and appropriate today.  No acute distress   Skin: warm and dry  HEENT: Edentulous.  Wearing dark glasses.  Neck: supple, no JVD  Lungs: Clear to auscultation bilaterally.  Heart: RRR, normal S1 and S2  Abdomen: soft, nontender, nondistended. +bs  : coude catheter  Extremities: no edema.  Neuro: Measured but appropriate answers to questions.      Scheduled Meds:     cefTRIAXone, 2,000 mg, Intravenous, Q24H  enoxaparin, 30 mg, Subcutaneous, Q24H  pantoprazole, 40 mg, Oral, Q AM  senna-docusate sodium, 2 tablet, Oral, BID  tamsulosin, 0.4 mg, Oral, Nightly      IV Meds:   sodium chloride, 100 mL/hr, Last Rate: 100 mL/hr (24 0520)        Results Reviewed:   I have personally reviewed the results from the time of this admission to 2024 16:08 EST     Results from last 7 days   Lab Units 24  0725 24  0326 24  0418 24  0437 24  1852   SODIUM mmol/L 139 138 139   < > 139   POTASSIUM mmol/L 3.7 4.0 4.7   < > 4.7   CHLORIDE mmol/L 109* 107 105   < > 100   CO2 mmol/L 20.0* 21.9* 20.6*   < > 26.0   BUN mg/dL 35* 41* 45*   < > 27*   CREATININE mg/dL 1.69* 1.64* 2.15*   < > 1.79*   CALCIUM mg/dL 7.4* 7.1* 8.0*   < >  9.5   BILIRUBIN mg/dL  --   --   --   --  0.5   ALK PHOS U/L  --   --   --   --  80   ALT (SGPT) U/L  --   --   --   --  22   AST (SGOT) U/L  --   --   --   --  47*   GLUCOSE mg/dL 88 98 96   < > 123*    < > = values in this interval not displayed.       Estimated Creatinine Clearance: 38.3 mL/min (A) (by C-G formula based on SCr of 1.69 mg/dL (H)).    Results from last 7 days   Lab Units 01/25/24  0725 01/24/24  0326 01/23/24  0418   MAGNESIUM mg/dL 2.2 2.4 2.5*   PHOSPHORUS mg/dL 2.6 2.6  --        Results from last 7 days   Lab Units 01/24/24  0326 01/23/24  0418   URIC ACID mg/dL 5.6 5.9       Results from last 7 days   Lab Units 01/25/24  0725 01/24/24  0326 01/23/24  0458 01/22/24  0437 01/21/24  1852   WBC 10*3/mm3 23.98* 15.86* 21.70* 15.81* 17.86*   HEMOGLOBIN g/dL 10.9* 10.1* 11.8* 13.1 12.3*   PLATELETS 10*3/mm3 253 218 262 350 372             Assessment / Plan     ASSESSMENT:  Acute kidney injury.  Nonoliguric.  Multifactorial including obstructive uropathy with requirement of coude catheter placement.  Taking nonsteroidals prior to admission.  Proteinuria predicted by protein creatinine ratio of 8 g.  Likely due to NSAIDs.  Creatinine at plateau 1.6, azotemia better  with IV fluids and discontinuation of NSAIDs as well as placement of catheter. Hypotension yesterday likely reason for plateau .  2.  Altered mental status improving.  3.  Ankylosing spondylitis, uveitis.  On methotrexate as an outpatient..  4.  Klebsiella urinary tract infection.  Ceftriaxone day 4 of 7 days planned . Significant leukocytosis today.  Dr. Basilio addressing .  5.  Hypertension.  Better after atenolol dc yesterday .  6.  BPH with urinary retention.  History of retention in the past.  Coudé catheter placed and patient on Flomax.  PLAN:  DC IVF.   Thank you for involving us in the care of Anthony Gallegos.  Please feel free to call with any questions.    Tosha Cummings MD  01/25/24  16:08 [facility]    [facility]  [phone]    Please note that portions of this note were completed with a voice recognition program. Copied portion of note reviewed and accurate as of 1/25/24.

## 2024-01-25 NOTE — PROGRESS NOTES
"Nutrition Services    Patient Name:  Anthony Gallegos  YOB: 1945  MRN: [id number]  Admit Date:  1/21/2024    Nutrition Follow Up    Assessment Date:  01/25/24    Encounter Information         Current Summary Transient alteration of awareness, UTI, DISH, MARTITA, urinary retention  Visited pt who states he is interested in supplements to help meet nutritional needs.     Current Nutrition Orders & Evaluation of Intake       Oral Nutrition     Current PO Diet    Supplement n/a   PO Evaluation     PO Intake % 50-75%    Factors Affecting Intake  weakness   --  Anthropometrics          Height    Weight Height: 190.5 cm (75\")  Weight: 76.4 kg (168 lb 6.9 oz) (01/25/24 0537)    BMI kg/m2 Body mass index is 21.05 kg/m².  Normal/Healthy (18.4 - 24.9)    Weight trend Loss     Labs        Pertinent Labs Reviewed, listed below     Results from last 7 days   Lab Units 01/25/24 0725 01/24/24 0326 01/23/24 0418 01/22/24  0437 01/21/24  1852   SODIUM mmol/L 139 138 139   < > 139   POTASSIUM mmol/L 3.7 4.0 4.7   < > 4.7   CHLORIDE mmol/L 109* 107 105   < > 100   CO2 mmol/L 20.0* 21.9* 20.6*   < > 26.0   BUN mg/dL 35* 41* 45*   < > 27*   CREATININE mg/dL 1.69* 1.64* 2.15*   < > 1.79*   CALCIUM mg/dL 7.4* 7.1* 8.0*   < > 9.5   BILIRUBIN mg/dL  --   --   --   --  0.5   ALK PHOS U/L  --   --   --   --  80   ALT (SGPT) U/L  --   --   --   --  22   AST (SGOT) U/L  --   --   --   --  47*   GLUCOSE mg/dL 88 98 96   < > 123*    < > = values in this interval not displayed.     Results from last 7 days   Lab Units 01/25/24 0725 01/24/24 0326 01/23/24 0458 01/23/24 0418   MAGNESIUM mg/dL 2.2 2.4  --  2.5*   PHOSPHORUS mg/dL 2.6 2.6   < >  --    HEMOGLOBIN g/dL 10.9* 10.1*   < >  --    HEMATOCRIT % 31.4* 29.3*   < >  --    WBC 10*3/mm3 23.98* 15.86*   < >  --    ALBUMIN g/dL 2.3* 1.8*  --   --     < > = values in this interval not displayed.     Results from last 7 days   Lab Units 01/25/24  0725 01/24/24  0326 01/23/24  0458 " "01/22/24  0437 01/21/24  1852   PLATELETS 10*3/mm3 253 218 262 350 372     No results found for: \"COVID19\"  Lab Results   Component Value Date    HGBA1C 5.80 (H) 01/23/2024          Medications            Scheduled Medications cefTRIAXone, 2,000 mg, Intravenous, Q24H  enoxaparin, 30 mg, Subcutaneous, Q24H  pantoprazole, 40 mg, Oral, Q AM  senna-docusate sodium, 2 tablet, Oral, BID  tamsulosin, 0.4 mg, Oral, Nightly        Infusions sodium chloride, 100 mL/hr, Last Rate: 100 mL/hr (01/25/24 0520)        PRN Medications   acetaminophen    senna-docusate sodium **AND** polyethylene glycol **AND** bisacodyl **AND** bisacodyl    melatonin    ondansetron ODT **OR** ondansetron    Potassium Replacement - Follow Nurse / BPA Driven Protocol    Insert Peripheral IV **AND** sodium chloride     Physical Findings          General Appearance alert, room air   Oral/Mouth Cavity tooth or teeth missing   Edema  no edema   Gastrointestinal last bowel movement: 1/24   Skin  skin intact   Tubes/Drains/Lines none   NFPE No clinical signs of muscle wasting or fat loss   --  NUTRITION INTERVENTION / PLAN OF CARE  Intervention Goal         Intervention Goal(s) Maintain nutrition status, Tolerate PO , Maintain weight, and PO intake goal %: 75+     Nutrition Intervention         RD Action Interview for preferences, Encourage intake, Continue to monitor, and Recommend/order: ONS     Nutrition Prescription         Diet Prescription     Supplement Prescription Boost Plus per pt request   EN/PN Prescription    New Prescription Ordered? Yes   --  Monitor/Evaluation        Monitor Per protocol   Discharge Needs Pending clinical course     RD to follow up per protocol.    Electronically signed by:  Tamar Ott RD  01/25/24 14:18 EST  "

## 2024-01-25 NOTE — PROGRESS NOTES
"  [facility] DAILY PROGRESS NOTE      Name: Anthony Gallegos  Age: 78 y.o.  Sex: male  :  1945  MRN: [id number]    Date: 2024             Subjective:  Interval History:   Clogging of catheter overnight - irrigated and drained 500 mL red-tinged urine    Objective:    Scheduled Meds:cefTRIAXone, 2,000 mg, Intravenous, Q24H  enoxaparin, 30 mg, Subcutaneous, Q24H  pantoprazole, 40 mg, Oral, Q AM  senna-docusate sodium, 2 tablet, Oral, BID  tamsulosin, 0.4 mg, Oral, Nightly      Continuous Infusions:sodium chloride, 100 mL/hr, Last Rate: 100 mL/hr (24 0520)      PRN Meds:  acetaminophen    senna-docusate sodium **AND** polyethylene glycol **AND** bisacodyl **AND** bisacodyl    melatonin    ondansetron ODT **OR** ondansetron    Potassium Replacement - Follow Nurse / BPA Driven Protocol    Insert Peripheral IV **AND** sodium chloride    Vital signs in last 24 hours:  Temp:  [98 °F (36.7 °C)-101.1 °F (38.4 °C)] 98.8 °F (37.1 °C)  Heart Rate:  [66-85] 85  Resp:  [18] 18  BP: ()/(51-72) 142/72    Intake/Output:    Intake/Output Summary (Last 24 hours) at 2024 0604  Last data filed at 2024 0421  Gross per 24 hour   Intake 2316.67 ml   Output 1900 ml   Net 416.67 ml       Exam:  /72 (BP Location: Right arm, Patient Position: Lying)   Pulse 85   Temp 98.8 °F (37.1 °C) (Oral)   Resp 18   Ht 190.5 cm (75\")   Wt 76.4 kg (168 lb 6.9 oz)   SpO2 98%   BMI 21.05 kg/m²     General Appearance:    Alert, cooperative, no acute distress   Back:     Symmetric, no CVA tenderness   Lungs:     Respirations unlabored   Heart:    Regular rate and rhythm   Abdomen:    Soft   Genitalia:   Mariscal red-tinged   Extremities:   Extremities normal, atraumatic, no cyanosis or edema   Skin:   Skin color, texture, turgor normal, no rashes or lesions        Data Review:  All labs (24hrs):   No results found for this or any previous visit (from the past 24 hour(s)).       Assessment:    Transient alteration of " awareness    Hypertension    DISH (disseminated idiopathic skeletal hyperostosis)    Generalized weakness    Altered mental status    GERD without esophagitis    BPH (benign prostatic hyperplasia)    Leukocytosis    UTI (urinary tract infection)    Dehydration    MARTITA (acute kidney injury)    Hyperglycemia    Altered mental state    Urinary retention  UTI  BPH  MARTITA    RASHEED 1/24/2024  - 1. No hydronephrosis seen on the current study.  2. Thick-walled appearance to the urinary bladder, as well as debris  within the bladder, suggesting cystitis.    Cr 0.7 ->1.79 -> 1.58 -> 2.15 -> 1.64    Plan:    - Mariscal catheter has decompressed lower and upper urinary tract  - If Mariscal continues to clog, we'll have Nurses exchange for slightly larger catheter  - maintain indwelling Mariscal catheter indefinitely  - Discussion to be held as outpatient regarding when Mariscal may be removed versus other interventions    Tani Lopez MD  1/25/2024  06:04 EST

## 2024-01-25 NOTE — PLAN OF CARE
Goal Outcome Evaluation:  Plan of Care Reviewed With: patient        Progress: no change  Outcome Evaluation: Pt resting in bed, struggling to feed himself breakfast. Pt agreeable to participate in therapy. He req mod-max A of 2 for bed mobility to sit EOB and dem strong post lean initially but was able to progress to sitting with CGA. Pt stood from EOB with mod A Of 2 using r wx. Pt amb 3-5' from bed to chair with mod A of 2 using r wx; shuffled steps, flexed posture, poor endurance, unsteady balance. Pt placed in chair with all needs met. Pt progressing slowly and limited by poor endurance. Cont PT to address functinal deficits and prepare for d/c. Recommend SNF.      Anticipated Discharge Disposition (PT): skilled nursing facility

## 2024-01-25 NOTE — NURSING NOTE
Assessed pt's lower abdomen, noticed that it is tight and pt's feels full on his bladder during palpation, bladder scanned and it showed 555 ml., assessed indwelling forde catheter for kinks and irrigate it with sterile water, at first it drained blood cloths with pink cream fluid until it becomes pinkish to reddish urine.

## 2024-01-25 NOTE — PROGRESS NOTES
Ded[facility] to Hospital Care    [phone]   LOS: 3 days     Name: Anthony Gallegos  Age/Sex: 78 y.o. male  :  1945        PCP: Provider, No Known  Chief Complaint   Patient presents with    Dysuria    Altered Mental Status      Subjective   No acute events overnight.  Patient sitting up in chair at bedside.  States he is feeling pretty well.  No chest pain or shortness of breath.  Was febrile around 7 PM last night but no fever since.  Blood pressures have improved.    cefTRIAXone, 2,000 mg, Intravenous, Q24H  enoxaparin, 30 mg, Subcutaneous, Q24H  pantoprazole, 40 mg, Oral, Q AM  senna-docusate sodium, 2 tablet, Oral, BID  tamsulosin, 0.4 mg, Oral, Nightly      sodium chloride, 100 mL/hr, Last Rate: 100 mL/hr (24 0520)        Objective   Vital Signs  Temp:  [98.8 °F (37.1 °C)-101.1 °F (38.4 °C)] 98.8 °F (37.1 °C)  Heart Rate:  [74-85] 81  Resp:  [18] 18  BP: (124-142)/(52-72) 124/52  Body mass index is 21.05 kg/m².    Intake/Output Summary (Last 24 hours) at 2024 1517  Last data filed at 2024 0900  Gross per 24 hour   Intake 1454.67 ml   Output 2170 ml   Net -715.33 ml     General: Alert, no acute distress.  Sitting in chair at bedside.  Slow to respond but oriented x 3.  Chronically ill and frail appearing.  ENT: No conjunctival injection or scleral icterus. Moist mucous membranes.   Neuro: Eyes open and moving in all directions, strength normal in all extremities, no focal deficits.   Lungs: Clear to auscultation bilaterally. No wheeze or crackles. No distress.   Heart: RRR, no murmurs. No edema.  Abdomen: Soft, non-tender, non-distended. Normal bowel sounds.   Ext: Warm and well-perfused. No edema.   Skin: No rashes or lesions. IV site without swelling or erythema.     Results Review:       I reviewed the patient's new clinical results.  Results from last 7 days   Lab Units 24  0725 24  0326 24  0458 24  0437 24  1852   WBC 10*3/mm3 23.98* 15.86* 21.70*  15.81* 17.86*   HEMOGLOBIN g/dL 10.9* 10.1* 11.8* 13.1 12.3*   PLATELETS 10*3/mm3 253 218 262 350 372     Results from last 7 days   Lab Units 01/25/24  0725 01/24/24  0326 01/23/24  0418 01/22/24  0437 01/21/24  1852   SODIUM mmol/L 139 138 139 138 139   POTASSIUM mmol/L 3.7 4.0 4.7 4.5 4.7   CHLORIDE mmol/L 109* 107 105 101 100   CO2 mmol/L 20.0* 21.9* 20.6* 23.4 26.0   BUN mg/dL 35* 41* 45* 30* 27*   CREATININE mg/dL 1.69* 1.64* 2.15* 1.58* 1.79*   CALCIUM mg/dL 7.4* 7.1* 8.0* 8.8 9.5   MAGNESIUM mg/dL 2.2 2.4 2.5*  --   --    PHOSPHORUS mg/dL 2.6 2.6  --   --   --    Estimated Creatinine Clearance: 38.3 mL/min (A) (by C-G formula based on SCr of 1.69 mg/dL (H)).      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       PLAN  Mr. Gallegos is a 78 y.o. male with a history of DISH, hypertension, ankylosing spondylitis and impaired mobility who presented to [facility] initially complaining of confusion and was found to have urinary tract infection and admitted to monitored unit.    -Urinary tract infection: CT scan of the abdomen and pelvis yesterday showed continued bladder distention and some evidence of possible pyelonephritis with hydronephrosis and stranding.  Urine culture now growing Klebsiella.  Has been afebrile for close to 24 hours.  Continue ceftriaxone 2 g every 24 hours x 7 days to  cover for pyelonephritis.    -Urinary retention: Mariscal catheter was placed and urology was consulted.  Urine continues to be blood-tinged.  If the Mariscal continues to clog, may need to place larger urinary catheter.  Patient going to be discharged with Mariscal in place.    -Leukocytosis: WBC jumped from 16 K to 24 K today.  No obvious new source of infection.  Clinically patient appears to be improving, has been afebrile since last night.  Blood cultures remain no growth to date.  Given that patient is clinically improving, going to hold off on further workup for now.  Leukocytosis could be reactive.  If WBC is improved tomorrow or patient worsens clinically, repeat blood cultures and infectious workup.  Also broaden antibiotic coverage at that time.    -Acute renal failure: Creatinine at 1.69 today, stable from yesterday.  Baseline is normal.  Avoid nephrotoxic agents including NSAIDs and contrast dyes.  Monitor with daily BMP.  Nephrology consulted and following.  Appreciate their assistance.    -Anemia: Hgb 10.9, baseline appears around 12.  No signs of active bleeding.  Monitor with daily CBC and transfuse for Hgb less than 7.    -Lovenox for DVT prophylaxis  -Full code    Patient and all medical problems new to me today.  Extensive chart review performed.  Initially came in for altered mental status and found to have urinary tract infection.  Mental status seems to have returned to baseline at this point.  I think he is nearing readiness for discharge.  Going to treat with 7 days of antibiotics for pyelonephritis.  His WBC did jump quite significantly to 24K today.  The patient seems to be improving clinically.  Going to hold off on further extensive infectious workup at this point given clinical improvement, but low threshold to broaden antibiotic should the patient change clinically in any way.  Repeat CBC with morning labs.  If WBC is trending down, patient can likely discharge sometime over the  weekend.    Mireya Basilio MD  [facility]

## 2024-01-25 NOTE — PLAN OF CARE
Problem: Adult Inpatient Plan of Care  Goal: Plan of Care Review  Outcome: Ongoing, Progressing  Flowsheets (Taken 1/25/2024 0628)  Plan of Care Reviewed With: patient  Outcome Evaluation: AOx4, VSS, SR on telemetry, room air, ongoing IV fluid infusion, maintained on contact prec, afebrile at the end of shift, w/ IFC to bedside drain -draining red tinge urine, bilateral renal ultrasound done, Q2 turning, call light within reach, will continue to monitor pt.  Goal: Patient-Specific Goal (Individualized)  Outcome: Ongoing, Progressing  Goal: Absence of Hospital-Acquired Illness or Injury  Outcome: Ongoing, Progressing  Intervention: Identify and Manage Fall Risk  Recent Flowsheet Documentation  Taken 1/25/2024 0600 by Antelmo Fitch, RN  Safety Promotion/Fall Prevention:   safety round/check completed   room organization consistent   nonskid shoes/slippers when out of bed   lighting adjusted   fall prevention program maintained   clutter free environment maintained   assistive device/personal items within reach  Taken 1/25/2024 0421 by Antelmo Fitch, RN  Safety Promotion/Fall Prevention:   safety round/check completed   room organization consistent   nonskid shoes/slippers when out of bed   lighting adjusted   fall prevention program maintained   clutter free environment maintained   assistive device/personal items within reach  Taken 1/25/2024 0200 by Antelmo Fitch, RN  Safety Promotion/Fall Prevention:   safety round/check completed   room organization consistent   nonskid shoes/slippers when out of bed   lighting adjusted   fall prevention program maintained   clutter free environment maintained   assistive device/personal items within reach  Taken 1/25/2024 0000 by Antelmo Fitch, RN  Safety Promotion/Fall Prevention:   safety round/check completed   room organization consistent   nonskid shoes/slippers when out of bed   lighting adjusted   clutter free environment  maintained   fall prevention program maintained   assistive device/personal items within reach  Taken 1/24/2024 2200 by Antelmo Fitch RN  Safety Promotion/Fall Prevention:   safety round/check completed   room organization consistent   nonskid shoes/slippers when out of bed   fall prevention program maintained   clutter free environment maintained   assistive device/personal items within reach   lighting adjusted  Taken 1/24/2024 2001 by Antelmo Fitch RN  Safety Promotion/Fall Prevention:   safety round/check completed   room organization consistent   nonskid shoes/slippers when out of bed   lighting adjusted   fall prevention program maintained   clutter free environment maintained   assistive device/personal items within reach  Intervention: Prevent Skin Injury  Recent Flowsheet Documentation  Taken 1/25/2024 0600 by Antelmo Fitch RN  Body Position:   weight shifting   left   tilted  Taken 1/25/2024 0421 by Antelmo Fitch RN  Body Position: supine, legs elevated  Taken 1/25/2024 0200 by Antelmo Fitch RN  Body Position:   right   tilted  Taken 1/25/2024 0000 by Antelmo Fitch RN  Body Position:   turned   left  Taken 1/24/2024 2200 by Antelmo Fitch RN  Body Position:   turned   right  Taken 1/24/2024 2001 by Antelmo Fitch RN  Body Position: supine, legs elevated  Skin Protection:   adhesive use limited   incontinence pads utilized   tubing/devices free from skin contact  Intervention: Prevent and Manage VTE (Venous Thromboembolism) Risk  Recent Flowsheet Documentation  Taken 1/25/2024 0600 by Antelmo Fitch RN  Activity Management: activity minimized  Taken 1/25/2024 0421 by Antelmo Fitch RN  Activity Management: activity minimized  Taken 1/25/2024 0200 by Antelmo Fitch RN  Activity Management: activity minimized  Taken 1/25/2024 0000 by Antelmo Fitch RN  Activity Management: activity  minimized  VTE Prevention/Management: (Lovenox) other (see comments)  Taken 1/24/2024 2200 by Antelmo Fitch RN  Activity Management: activity minimized  Taken 1/24/2024 2001 by Antelmo Fitch RN  Activity Management: activity minimized  VTE Prevention/Management: (Lovenox) other (see comments)  Range of Motion: active ROM (range of motion) encouraged  Intervention: Prevent Infection  Recent Flowsheet Documentation  Taken 1/25/2024 0600 by Antelmo Fitch RN  Infection Prevention:   single patient room provided   rest/sleep promoted   personal protective equipment utilized   hand hygiene promoted   environmental surveillance performed  Taken 1/25/2024 0421 by Antelmo Fitch RN  Infection Prevention:   single patient room provided   rest/sleep promoted   personal protective equipment utilized   hand hygiene promoted   environmental surveillance performed  Taken 1/25/2024 0200 by Antelmo Fitch RN  Infection Prevention:   single patient room provided   rest/sleep promoted   personal protective equipment utilized   hand hygiene promoted   environmental surveillance performed  Taken 1/25/2024 0000 by Antelmo Fitch RN  Infection Prevention:   single patient room provided   rest/sleep promoted   personal protective equipment utilized   hand hygiene promoted   environmental surveillance performed  Taken 1/24/2024 2200 by Antelmo Fitch RN  Infection Prevention:   single patient room provided   rest/sleep promoted   personal protective equipment utilized   hand hygiene promoted   environmental surveillance performed  Taken 1/24/2024 2001 by Antelmo Fitch RN  Infection Prevention:   single patient room provided   rest/sleep promoted   personal protective equipment utilized   hand hygiene promoted   environmental surveillance performed  Goal: Optimal Comfort and Wellbeing  Outcome: Ongoing, Progressing  Intervention: Provide Person-Centered  Care  Recent Flowsheet Documentation  Taken 1/25/2024 0000 by Antelmo Fitch RN  Trust Relationship/Rapport:   care explained   choices provided  Taken 1/24/2024 2001 by Antelmo Fitch RN  Trust Relationship/Rapport:   care explained   choices provided   questions answered   questions encouraged  Goal: Readiness for Transition of Care  Outcome: Ongoing, Progressing     Problem: UTI (Urinary Tract Infection)  Goal: Improved Infection Symptoms  Outcome: Ongoing, Progressing     Problem: Fall Injury Risk  Goal: Absence of Fall and Fall-Related Injury  Outcome: Ongoing, Progressing  Intervention: Promote Injury-Free Environment  Recent Flowsheet Documentation  Taken 1/25/2024 0600 by Antelmo Fitch RN  Safety Promotion/Fall Prevention:   safety round/check completed   room organization consistent   nonskid shoes/slippers when out of bed   lighting adjusted   fall prevention program maintained   clutter free environment maintained   assistive device/personal items within reach  Taken 1/25/2024 0421 by Antelmo Fitch RN  Safety Promotion/Fall Prevention:   safety round/check completed   room organization consistent   nonskid shoes/slippers when out of bed   lighting adjusted   fall prevention program maintained   clutter free environment maintained   assistive device/personal items within reach  Taken 1/25/2024 0200 by Antelmo Fitch RN  Safety Promotion/Fall Prevention:   safety round/check completed   room organization consistent   nonskid shoes/slippers when out of bed   lighting adjusted   fall prevention program maintained   clutter free environment maintained   assistive device/personal items within reach  Taken 1/25/2024 0000 by Antelmo Fitch RN  Safety Promotion/Fall Prevention:   safety round/check completed   room organization consistent   nonskid shoes/slippers when out of bed   lighting adjusted   clutter free environment maintained   fall prevention  program maintained   assistive device/personal items within reach  Taken 1/24/2024 2200 by Antelmo Fitch RN  Safety Promotion/Fall Prevention:   safety round/check completed   room organization consistent   nonskid shoes/slippers when out of bed   fall prevention program maintained   clutter free environment maintained   assistive device/personal items within reach   lighting adjusted  Taken 1/24/2024 2001 by Antelmo Fitch RN  Safety Promotion/Fall Prevention:   safety round/check completed   room organization consistent   nonskid shoes/slippers when out of bed   lighting adjusted   fall prevention program maintained   clutter free environment maintained   assistive device/personal items within reach     Problem: Skin Injury Risk Increased  Goal: Skin Health and Integrity  Outcome: Ongoing, Progressing  Intervention: Optimize Skin Protection  Recent Flowsheet Documentation  Taken 1/25/2024 0600 by Antelmo Fitch RN  Head of Bed (HOB) Positioning: HOB at 30-45 degrees  Taken 1/25/2024 0421 by Antelmo Fitch RN  Head of Bed (HOB) Positioning: HOB elevated  Taken 1/25/2024 0200 by Antelmo Fitch RN  Head of Bed (HOB) Positioning: HOB elevated  Taken 1/25/2024 0000 by Antelmo Fitch RN  Head of Bed (HOB) Positioning: HOB elevated  Taken 1/24/2024 2200 by Antelmo Fitch RN  Head of Bed (HOB) Positioning: HOB elevated  Taken 1/24/2024 2001 by Antelmo Fitch RN  Pressure Reduction Techniques:   frequent weight shift encouraged   weight shift assistance provided  Head of Bed (HOB) Positioning: HOB elevated  Pressure Reduction Devices: positioning supports utilized  Skin Protection:   adhesive use limited   incontinence pads utilized   tubing/devices free from skin contact   Goal Outcome Evaluation:  Plan of Care Reviewed With: patient           Outcome Evaluation: AOx4, VSS, SR on telemetry, room air, ongoing IV fluid infusion, maintained on  contact prec, afebrile at the end of shift, w/ IFC to bedside drain -draining red tinge urine, bilateral renal ultrasound done, Q2 turning, call light within reach, will continue to monitor pt.

## 2024-01-26 LAB
ALBUMIN SERPL-MCNC: 2.2 G/DL (ref 3.5–5.2)
ANION GAP SERPL CALCULATED.3IONS-SCNC: 10.3 MMOL/L (ref 5–15)
BACTERIA SPEC AEROBE CULT: NORMAL
BASOPHILS # BLD AUTO: 0.04 10*3/MM3 (ref 0–0.2)
BASOPHILS NFR BLD AUTO: 0.3 % (ref 0–1.5)
BUN SERPL-MCNC: 32 MG/DL (ref 8–23)
BUN/CREAT SERPL: 21.3 (ref 7–25)
CALCIUM SPEC-SCNC: 7.5 MG/DL (ref 8.6–10.5)
CHLORIDE SERPL-SCNC: 107 MMOL/L (ref 98–107)
CO2 SERPL-SCNC: 19.7 MMOL/L (ref 22–29)
CREAT SERPL-MCNC: 1.5 MG/DL (ref 0.76–1.27)
CREAT UR-MCNC: 68.9 MG/DL
DEPRECATED RDW RBC AUTO: 41.5 FL (ref 37–54)
EGFRCR SERPLBLD CKD-EPI 2021: 47.1 ML/MIN/1.73
EOSINOPHIL # BLD AUTO: 0.12 10*3/MM3 (ref 0–0.4)
EOSINOPHIL NFR BLD AUTO: 0.8 % (ref 0.3–6.2)
ERYTHROCYTE [DISTWIDTH] IN BLOOD BY AUTOMATED COUNT: 13 % (ref 12.3–15.4)
GLUCOSE SERPL-MCNC: 95 MG/DL (ref 65–99)
HCT VFR BLD AUTO: 31.2 % (ref 37.5–51)
HGB BLD-MCNC: 10.3 G/DL (ref 13–17.7)
IMM GRANULOCYTES # BLD AUTO: 0.16 10*3/MM3 (ref 0–0.05)
IMM GRANULOCYTES NFR BLD AUTO: 1 % (ref 0–0.5)
LYMPHOCYTES # BLD AUTO: 1.04 10*3/MM3 (ref 0.7–3.1)
LYMPHOCYTES NFR BLD AUTO: 6.6 % (ref 19.6–45.3)
MAGNESIUM SERPL-MCNC: 2.3 MG/DL (ref 1.6–2.4)
MCH RBC QN AUTO: 29.3 PG (ref 26.6–33)
MCHC RBC AUTO-ENTMCNC: 33 G/DL (ref 31.5–35.7)
MCV RBC AUTO: 88.9 FL (ref 79–97)
MONOCYTES # BLD AUTO: 1.84 10*3/MM3 (ref 0.1–0.9)
MONOCYTES NFR BLD AUTO: 11.7 % (ref 5–12)
NEUTROPHILS NFR BLD AUTO: 12.53 10*3/MM3 (ref 1.7–7)
NEUTROPHILS NFR BLD AUTO: 79.6 % (ref 42.7–76)
NRBC BLD AUTO-RTO: 0 /100 WBC (ref 0–0.2)
PHOSPHATE SERPL-MCNC: 1.6 MG/DL (ref 2.5–4.5)
PLATELET # BLD AUTO: 283 10*3/MM3 (ref 140–450)
PMV BLD AUTO: 9.3 FL (ref 6–12)
POTASSIUM SERPL-SCNC: 3.6 MMOL/L (ref 3.5–5.2)
PROT ?TM UR-MCNC: 260.7 MG/DL
PROT/CREAT UR: 3783.7 MG/G CREA (ref 0–200)
RBC # BLD AUTO: 3.51 10*6/MM3 (ref 4.14–5.8)
SODIUM SERPL-SCNC: 137 MMOL/L (ref 136–145)
WBC NRBC COR # BLD AUTO: 15.73 10*3/MM3 (ref 3.4–10.8)

## 2024-01-26 PROCEDURE — 85025 COMPLETE CBC W/AUTO DIFF WBC: CPT | Performed by: STUDENT IN AN ORGANIZED HEALTH CARE EDUCATION/TRAINING PROGRAM

## 2024-01-26 PROCEDURE — 84156 ASSAY OF PROTEIN URINE: CPT | Performed by: INTERNAL MEDICINE

## 2024-01-26 PROCEDURE — 25010000002 ENOXAPARIN PER 10 MG: Performed by: HOSPITALIST

## 2024-01-26 PROCEDURE — 25010000002 CEFTRIAXONE PER 250 MG: Performed by: STUDENT IN AN ORGANIZED HEALTH CARE EDUCATION/TRAINING PROGRAM

## 2024-01-26 PROCEDURE — 97530 THERAPEUTIC ACTIVITIES: CPT

## 2024-01-26 PROCEDURE — 86334 IMMUNOFIX E-PHORESIS SERUM: CPT | Performed by: INTERNAL MEDICINE

## 2024-01-26 PROCEDURE — 83735 ASSAY OF MAGNESIUM: CPT | Performed by: STUDENT IN AN ORGANIZED HEALTH CARE EDUCATION/TRAINING PROGRAM

## 2024-01-26 PROCEDURE — 80069 RENAL FUNCTION PANEL: CPT | Performed by: INTERNAL MEDICINE

## 2024-01-26 PROCEDURE — 82570 ASSAY OF URINE CREATININE: CPT | Performed by: INTERNAL MEDICINE

## 2024-01-26 PROCEDURE — 83521 IG LIGHT CHAINS FREE EACH: CPT | Performed by: INTERNAL MEDICINE

## 2024-01-26 PROCEDURE — 92610 EVALUATE SWALLOWING FUNCTION: CPT

## 2024-01-26 PROCEDURE — 82784 ASSAY IGA/IGD/IGG/IGM EACH: CPT | Performed by: INTERNAL MEDICINE

## 2024-01-26 RX ADMIN — ACETAMINOPHEN 650 MG: 325 TABLET, FILM COATED ORAL at 09:44

## 2024-01-26 RX ADMIN — TAMSULOSIN HYDROCHLORIDE 0.4 MG: 0.4 CAPSULE ORAL at 20:57

## 2024-01-26 RX ADMIN — SENNOSIDES AND DOCUSATE SODIUM 2 TABLET: 50; 8.6 TABLET ORAL at 09:31

## 2024-01-26 RX ADMIN — PANTOPRAZOLE SODIUM 40 MG: 40 TABLET, DELAYED RELEASE ORAL at 05:52

## 2024-01-26 RX ADMIN — CEFTRIAXONE 2000 MG: 2 INJECTION, POWDER, FOR SOLUTION INTRAMUSCULAR; INTRAVENOUS at 20:57

## 2024-01-26 RX ADMIN — ENOXAPARIN SODIUM 30 MG: 100 INJECTION SUBCUTANEOUS at 09:31

## 2024-01-26 NOTE — THERAPY TREATMENT NOTE
Patient Name: Anthony Gallegos  : 1945    MRN: [id number]                              Today's Date: 2024       Admit Date: 2024    Visit Dx:     ICD-10-CM ICD-9-CM   1. Altered mental status, unspecified altered mental status type  R41.82 780.97   2. Leukocytosis, unspecified type  D72.829 288.60     Patient Active Problem List   Diagnosis    Ankylosing spondylitis of cervical region    Recent unexplained weight loss    Abnormal serum lipase level    Abnormal serum level of amylase    Hypertension    Boil    Immobility    Fall from bed    Tetrahydrocannabinol (THC) use disorder, mild, abuse    Generalized pain        Grief    DISH (disseminated idiopathic skeletal hyperostosis)    Generalized weakness    Severe malnutrition    Altered mental status    Transient alteration of awareness    GERD without esophagitis    BPH (benign prostatic hyperplasia)    Leukocytosis    UTI (urinary tract infection)    Dehydration    MARTITA (acute kidney injury)    Hyperglycemia    Altered mental state     Past Medical History:   Diagnosis Date    Abscess of scrotum     Arthritis     AS (ankylosing spondylitis)     Hypertension     Tetrahydrocannabinol (THC) use disorder, mild, abuse 2023    Uveitis      Past Surgical History:   Procedure Laterality Date    COLONOSCOPY      ROTATOR CUFF REPAIR Right     TOTAL HIP ARTHROPLASTY Left       General Information       Row Name 24 1155          Physical Therapy Time and Intention    Document Type therapy note (daily note)  -DJ     Mode of Treatment co-treatment;physical therapy;occupational therapy  -DJ       Row Name 24 1155          General Information    Patient Profile Reviewed yes  -DJ     Existing Precautions/Restrictions fall  -DJ       Row Name 24 1155          Cognition    Orientation Status (Cognition) oriented x 3  -DJ       Row Name 24 1155          Safety Issues, Functional Mobility    Comment, Safety Issues/Impairments  (Mobility) gt belt, nonskid socks  -DJ               User Key  (r) = Recorded By, (t) = Taken By, (c) = Cosigned By      Initials Name Provider Type    Roxi Stark, PT Physical Therapist                   Mobility       Row Name 01/26/24 1156          Bed Mobility    Bed Mobility supine-sit  -DJ     Supine-Sit Whitley (Bed Mobility) maximum assist (25% patient effort);2 person assist;verbal cues  -DJ     Sit-Supine Whitley (Bed Mobility) not tested  -DJ     Assistive Device (Bed Mobility) head of bed elevated;bed rails  -DJ     Comment, (Bed Mobility) very stiff, strong post lean with initial sitting  -DJ       Row Name 01/26/24 1156          Transfers    Comment, (Transfers) sit/stand from raised EOB  -DJ       Row Name 01/26/24 1156          Bed-Chair Transfer    Bed-Chair Whitley (Transfers) moderate assist (50% patient effort);2 person assist;verbal cues  -DJ     Assistive Device (Bed-Chair Transfers) walker, front-wheeled  -DJ       Row Name 01/26/24 1156          Sit-Stand Transfer    Sit-Stand Whitley (Transfers) moderate assist (50% patient effort);2 person assist;verbal cues  -DJ     Assistive Device (Sit-Stand Transfers) walker, front-wheeled  -DJ     Comment, (Sit-Stand Transfer) post lean  -DJ       Row Name 01/26/24 1156          Gait/Stairs (Locomotion)    Whitley Level (Gait) moderate assist (50% patient effort);2 person assist;verbal cues  -DJ     Assistive Device (Gait) walker, front-wheeled  -DJ     Distance in Feet (Gait) 5'  -DJ     Deviations/Abnormal Patterns (Gait) festinating/shuffling;stride length decreased;gait speed decreased;ranjit decreased;base of support, narrow  -DJ     Bilateral Gait Deviations forward flexed posture;heel strike decreased  -DJ     Whitley Level (Stairs) not tested  -DJ     Comment, (Gait/Stairs) Pt amb 5' bed to chair with min - mod A of 2 using r wx; very slow, req constant vc to take bigger steps, unsteady balance but no overt  LOB, poor endurance.  -DJ               User Key  (r) = Recorded By, (t) = Taken By, (c) = Cosigned By      Initials Name Provider Type    Roxi Stark PT Physical Therapist                   Obj/Interventions       Row Name 01/26/24 1200          Motor Skills    Motor Skills functional endurance  -DJ     Functional Endurance poor  -DJ     Therapeutic Exercise other (see comments)  AP, seated hip flex  -DJ       Row Name 01/26/24 1200          Balance    Balance Assessment standing static balance;standing dynamic balance;sitting dynamic balance  -DJ     Dynamic Sitting Balance contact guard;verbal cues;moderate assist  -DJ     Position, Sitting Balance unsupported;sitting edge of bed  -DJ     Static Standing Balance minimal assist;2-person assist;verbal cues  -DJ     Dynamic Standing Balance moderate assist;2-person assist;verbal cues  -DJ     Position/Device Used, Standing Balance walker, front-wheeled;supported  -DJ     Balance Interventions sitting;standing;sit to stand;supported;weight shifting activity  -DJ     Comment, Balance unsteady  -DJ               User Key  (r) = Recorded By, (t) = Taken By, (c) = Cosigned By      Initials Name Provider Type    Roxi Stark PT Physical Therapist                   Goals/Plan    No documentation.                  Clinical Impression       Row Name 01/26/24 1201          Pain    Pretreatment Pain Rating 0/10 - no pain  -DJ       Row Name 01/26/24 1201          Plan of Care Review    Plan of Care Reviewed With patient  -DJ     Progress no change  -DJ     Outcome Evaluation Pt resting in bed, pleasant and cooperative, soft speech difficult to understand. Pt req max A of 2 to sit EOB usign bed rails. Pt dem post lean initially but able to sti unsupported eventually. Pt stood from raised EOB with mod A of 2 using r wx. Pt amb 5' bed to chair with min - mod A of 2 using r wx; very slow, req constant vc to take bigger steps, unsteady balance but no overt LOB, poor  endurance. Pt performed seated LE ther ex with vc. Pt returned to recliner chair with all needs met - he requested to sit with feet down. Slow progress overall, if any. Cont PT to address functional deficits and prepare for d/c.  -DJ       Row Name 01/26/24 1201          Therapy Assessment/Plan (PT)    Patient/Family Therapy Goals Statement (PT) return to facility  -DJ     Criteria for Skilled Interventions Met (PT) skilled treatment is necessary  -DJ       Row Name 01/26/24 1201          Vital Signs    O2 Delivery Pre Treatment room air  -DJ     O2 Delivery Intra Treatment room air  -DJ     O2 Delivery Post Treatment room air  -DJ     Pre Patient Position Supine  -DJ     Intra Patient Position Standing  -DJ     Post Patient Position Sitting  -DJ       Row Name 01/26/24 1201          Positioning and Restraints    Pre-Treatment Position in bed  -DJ     Post Treatment Position chair  -DJ     In Chair notified nsg;sitting;call light within reach;encouraged to call for assist;exit alarm on  -DJ               User Key  (r) = Recorded By, (t) = Taken By, (c) = Cosigned By      Initials Name Provider Type    Roxi Stark, PT Physical Therapist                   Outcome Measures       Row Name 01/26/24 1206 01/26/24 0400       How much help from another person do you currently need...    Turning from your back to your side while in flat bed without using bedrails? 2  -DJ 2  -BB    Moving from lying on back to sitting on the side of a flat bed without bedrails? 2  -DJ 2  -BB    Moving to and from a bed to a chair (including a wheelchair)? 2  -DJ 2  -BB    Standing up from a chair using your arms (e.g., wheelchair, bedside chair)? 2  -DJ 2  -BB    Climbing 3-5 steps with a railing? 1  -DJ 1  -BB    To walk in hospital room? 2  -DJ 2  -BB    AM-PAC 6 Clicks Score (PT) 11  -DJ 11  -BB    Highest Level of Mobility Goal 4 --> Transfer to chair/commode  -DJ 4 --> Transfer to chair/commode  -BB      Row Name 01/26/24 1206           Functional Assessment    Outcome Measure Options AM-PAC 6 Clicks Basic Mobility (PT)  -LOUIS               User Key  (r) = Recorded By, (t) = Taken By, (c) = Cosigned By      Initials Name Provider Type    Roxi Stark, PT Physical Therapist    Talisha Pisano, RN Registered Nurse                                 Physical Therapy Education       Title: PT OT SLP Therapies (In Progress)       Topic: Physical Therapy (In Progress)       Point: Mobility training (In Progress)       Learning Progress Summary             Patient Acceptance, E, NR by LOUIS at 1/26/2024 1207    Acceptance, E, VU by CECI at 1/26/2024 0147    Acceptance, E, VU,NR by LOUIS at 1/25/2024 1151    Acceptance, E, VU by GEETA at 1/24/2024 1404    Acceptance, E,D, VU,NR by AMELIA at 1/23/2024 1744    Acceptance, E, VU,NR by CLARISSA at 1/22/2024 1427                         Point: Home exercise program (In Progress)       Learning Progress Summary             Patient Acceptance, E, NR by LOUIS at 1/26/2024 1207    Acceptance, E, VU by CECI at 1/26/2024 0147    Acceptance, E, VU,NR by LOUIS at 1/25/2024 1151    Acceptance, E, VU by GEETA at 1/24/2024 1404    Acceptance, E,D, VU,NR by AMELIA at 1/23/2024 1744    Acceptance, E, VU,NR by CLARISSA at 1/22/2024 1427                         Point: Body mechanics (In Progress)       Learning Progress Summary             Patient Acceptance, E, NR by LOUIS at 1/26/2024 1207    Acceptance, E, VU by CECI at 1/26/2024 0147    Acceptance, E, VU,NR by LOUIS at 1/25/2024 1151    Acceptance, E,D, VU,NR by AMELIA at 1/23/2024 1744    Acceptance, E, VU,NR by CLARISSA at 1/22/2024 1427                         Point: Precautions (In Progress)       Learning Progress Summary             Patient Acceptance, E, NR by LOUIS at 1/26/2024 1207    Acceptance, E, VU by CECI at 1/26/2024 0147    Acceptance, E, VU,NR by LOUIS at 1/25/2024 1151    Acceptance, E,D, VU,NR by AMELIA at 1/23/2024 1744    Acceptance, E, VU,NR by DB at 1/22/2024 1427                                         User Key        Initials Effective Dates Name Provider Type Discipline    EM 06/16/21 -  Cierra Valdivia, PT Physical Therapist PT    JM 03/07/18 -  Rosa Portillo PTA Physical Therapist Assistant PT    DB 06/16/21 -  Felipa Weathers, PT Physical Therapist PT    DJ 10/25/19 -  Roxi Schofield PT Physical Therapist PT    BB 11/16/23 -  Talisha Patel, RN Registered Nurse Nurse                  PT Recommendation and Plan     Plan of Care Reviewed With: patient  Progress: no change  Outcome Evaluation: Pt resting in bed, pleasant and cooperative, soft speech difficult to understand. Pt req max A of 2 to sit EOB usign bed rails. Pt dem post lean initially but able to sti unsupported eventually. Pt stood from raised EOB with mod A of 2 using r wx. Pt amb 5' bed to chair with min - mod A of 2 using r wx; very slow, req constant vc to take bigger steps, unsteady balance but no overt LOB, poor endurance. Pt performed seated LE ther ex with vc. Pt returned to recliner chair with all needs met - he requested to sit with feet down. Slow progress overall, if any. Cont PT to address functional deficits and prepare for d/c.     Time Calculation:         PT Charges       Row Name 01/26/24 1207             Time Calculation    Start Time 0846  -DJ      Stop Time 0912  -DJ      Time Calculation (min) 26 min  -DJ      PT Non-Billable Time (min) 10 min  -DJ      PT Received On 01/26/24  -DJ      PT - Next Appointment 01/27/24  -DJ                User Key  (r) = Recorded By, (t) = Taken By, (c) = Cosigned By      Initials Name Provider Type    Roxi Stark, PT Physical Therapist                  Therapy Charges for Today       Code Description Service Date Service Provider Modifiers Qty    94435860277 HC PT THERAPEUTIC ACT EA 15 MIN 1/25/2024 Roxi Schofield, PT GP 2    62449348842 HC PT THER SUPP EA 15 MIN 1/25/2024 Roxi Schofield, PT GP 2    13389155706 HC PT THERAPEUTIC ACT EA 15 MIN 1/26/2024 Roxi Schofield, PT GP 2            PT  G-Codes  Outcome Measure Options: AM-PAC 6 Clicks Basic Mobility (PT)  AM-PAC 6 Clicks Score (PT): 11  AM-PAC 6 Clicks Score (OT): 10  Modified [place] Scale: 4 - Moderately severe disability.  Unable to walk without assistance, and unable to attend to own bodily needs without assistance.  PT Discharge Summary  Anticipated Discharge Disposition (PT): skilled nursing facility    Roxi Schofield, PT  1/26/2024

## 2024-01-26 NOTE — PROGRESS NOTES
[facility] Progress Note      Patient Name: Anthony Gallegos  : 1945  MRN: [id number]  Primary Care Physician:  Provider, No Known  Date of admission: 2024    Subjective     Interval History:   Follow-up acute kidney injury.      Patient is not feeling well but is not very specific, he has coudé catheter in place, urine output over the past 24 hours was 1650 cc  No chest pain or shortness of air, no orthopnea or PND, no nausea or vomiting.      Review of Systems:   As noted above    Objective     Vitals:   Temp:  [98.8 °F (37.1 °C)-99 °F (37.2 °C)] 99 °F (37.2 °C)  Heart Rate:  [81-82] 81  Resp:  [18] 18  BP: (111-139)/(40-62) 111/57    Intake/Output Summary (Last 24 hours) at 2024 1012  Last data filed at 2024 0900  Gross per 24 hour   Intake 2076.67 ml   Output 1050 ml   Net 1026.67 ml       Physical Exam:    General Appearance: alert, awake, chronically ill, no acute distress  Skin: warm and dry  HEENT: Edentulous.    Neck: supple, no JVD  Lungs: Clear to auscultation, unlabored breathing effort.  Heart: RRR, no rub  Abdomen: soft, he had significant tenderness to palpation below his umbilicus, nondistended.  Normoactive bowel  : coude catheter  Extremities: no edema.  Neuro: Moving all extremities    Scheduled Meds:     cefTRIAXone, 2,000 mg, Intravenous, Q24H  enoxaparin, 30 mg, Subcutaneous, Q24H  pantoprazole, 40 mg, Oral, Q AM  senna-docusate sodium, 2 tablet, Oral, BID  tamsulosin, 0.4 mg, Oral, Nightly      IV Meds:          Results Reviewed:   I have personally reviewed the results from the time of this admission to 2024 10:12 EST     Results from last 7 days   Lab Units 24  0514 24  0725 24  0326 24  0437 24  1852   SODIUM mmol/L 137 139 138   < > 139   POTASSIUM mmol/L 3.6 3.7 4.0   < > 4.7   CHLORIDE mmol/L 107 109* 107   < > 100   CO2 mmol/L 19.7* 20.0* 21.9*   < > 26.0   BUN mg/dL 32* 35* 41*   < > 27*    CREATININE mg/dL 1.50* 1.69* 1.64*   < > 1.79*   CALCIUM mg/dL 7.5* 7.4* 7.1*   < > 9.5   BILIRUBIN mg/dL  --   --   --   --  0.5   ALK PHOS U/L  --   --   --   --  80   ALT (SGPT) U/L  --   --   --   --  22   AST (SGOT) U/L  --   --   --   --  47*   GLUCOSE mg/dL 95 88 98   < > 123*    < > = values in this interval not displayed.       Estimated Creatinine Clearance: 43.2 mL/min (A) (by C-G formula based on SCr of 1.5 mg/dL (H)).    Results from last 7 days   Lab Units 01/26/24  0514 01/25/24  0725 01/24/24  0326   MAGNESIUM mg/dL 2.3 2.2 2.4   PHOSPHORUS mg/dL 1.6* 2.6 2.6       Results from last 7 days   Lab Units 01/24/24  0326 01/23/24  0418   URIC ACID mg/dL 5.6 5.9       Results from last 7 days   Lab Units 01/26/24  0514 01/25/24  0725 01/24/24  0326 01/23/24  0458 01/22/24  0437   WBC 10*3/mm3 15.73* 23.98* 15.86* 21.70* 15.81*   HEMOGLOBIN g/dL 10.3* 10.9* 10.1* 11.8* 13.1   PLATELETS 10*3/mm3 283 253 218 262 350             Assessment / Plan     ASSESSMENT:  Acute kidney injury.  Nonoliguric.  Multifactorial including obstructive uropathy with requirement of coude catheter placement.  Taking nonsteroidals prior to admission.  Proteinuria predicted by protein creatinine ratio of 8 g.  Likely due to NSAIDs.  Today is 1.5 improving, he had significant proteinuria his protein to creatinine ratio was 8.47 g/g on admission with concern about glomerulonephritis.  2.   Altered mental status improving.  3.  Ankylosing spondylitis, uveitis.  On methotrexate as an outpatient..  4.  Klebsiella urinary tract infection.  Treated  5.  Hypertension.  Reasonably controlled.  6.  BPH with urinary retention.  History of retention in the past.  Coudé catheter placed and patient on Flomax.      PLAN:  Continue the same treat  I will recheck his protein to creatinine ratio again to determine if we need to do an evaluation for GN, but given his anemia I will check immunofixation  Surveillance labs      I reviewed the chart  and other providers notes, reviewed labs.  I discussed the case with the patient.  Copied text in this note has been reviewed and is accurate as of 01/26/24.     Thank you for involving us in the care of Anthony Gallegos.  Please feel free to call with any questions.    Thompson Novoa MD  01/26/24  10:12 [facility]    [facility]  [phone]    Please note that portions of this note were completed with a voice recognition program. Copied portion of note reviewed and accurate as of 1/25/24.

## 2024-01-26 NOTE — PLAN OF CARE
Goal Outcome Evaluation:  Plan of Care Reviewed With: patient           Outcome Evaluation: Swallow eval completed. Pt had consistent couging noted with thin via straw. Pt tolerated cup sips of thin better with no coughing. Pt had no s/s with NTL (straw), pureed, soft, or mixed. Pt had slow mastication with soft solids. Oral cavity clear post swallow. Recommend soft, ground w/ thin; meds whole in pureed; upright for meals and 30 min after; slow rate; small bites/sips; NO STRAWS. ST to follow.      Anticipated Discharge Disposition (SLP): skilled nursing facility, anticipate therapy at next level of care          SLP Swallowing Diagnosis: oral dysphagia, suspected pharyngeal dysphagia (01/26/24 1400)

## 2024-01-26 NOTE — DISCHARGE PLACEMENT REQUEST
"Anthony Gallegos (78 y.o. Male)       Date of Birth   1945    Social Security Number       Address   [address] [address]    Home Phone   [phone]    MRN   [id number]       [facility]    Marital Status                               Admission Date   1/21/24    Admission Type   Emergency    Admitting Provider   Casie Reardon MD    Attending Provider   Mireya Basilio MD    Department, Room/Bed   [address], [address]       Discharge Date       Discharge Disposition       Discharge Destination                                 Attending Provider: Mireya Basilio MD    Allergies: No Known Allergies    Isolation: Contact   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), Candida Auris (rule out) (01/23/24)   Code Status: CPR    Ht: 190.5 cm (75\")   Wt: 76.4 kg (168 lb 6.9 oz)    Admission Cmt: None   Principal Problem: Transient alteration of awareness [R40.4]                   Active Insurance as of 1/21/2024       Primary Coverage       Payor Plan Insurance Group Employer/Plan Group    [employer]ETNA MEDICARE REPLACEMENT AETNA MED ADV POS [id number]       Payor Plan Address Payor Plan Phone Number Payor Plan Fax Number Effective Dates    [address] [phone]  12/1/2023 - None Entered    [facility] [id number]         Subscriber Name Subscriber Birth Date Member ID       ANTHONY GALLEGOS 1945 [id number]                     Emergency Contacts        (Rel.) Home Phone Work Phone Mobile Phone    NATHALIE DON (Daughter) [phone] -- --    MAGY GALLEGOS [phone] -- --              "

## 2024-01-26 NOTE — PLAN OF CARE
Goal Outcome Evaluation:  Plan of Care Reviewed With: patient        Progress: improving  Outcome Evaluation: Patient tolerated treatment well, and seen for OT/PT cotreatment. Patient performed bed mobility, continuing to require max Ax2, with maximal verbal cues for initiation and sequencing. Patient able to sit EOB unsupported with cues for postural alignment, and bed mechanics. Patient performed sit to stand and bed to chair transfer with use of rwx with slow gait, requiring max verbal cues for each step, and significantly increased time for participation in transfers. Patient will continue to benefit from skilled OT to address current functional deficits, anticipate need for SNF.      Anticipated Discharge Disposition (OT): skilled nursing facility

## 2024-01-26 NOTE — PLAN OF CARE
Goal Outcome Evaluation:  Plan of Care Reviewed With: patient        Progress: no change  Outcome Evaluation: Pt resting in bed, pleasant and cooperative, soft speech difficult to understand. Pt req max A of 2 to sit EOB usign bed rails. Pt dem post lean initially but able to sti unsupported eventually. Pt stood from raised EOB with mod A of 2 using r wx. Pt amb 5' bed to chair with min - mod A of 2 using r wx; very slow, req constant vc to take bigger steps, unsteady balance but no overt LOB, poor endurance. Pt performed seated LE ther ex with vc. Pt returned to recliner chair with all needs met - he requested to sit with feet down. Slow progress overall, if any. Cont PT to address functional deficits and prepare for d/c.      Anticipated Discharge Disposition (PT): skilled nursing facility

## 2024-01-26 NOTE — PLAN OF CARE
Goal Outcome Evaluation:   Patient alert and on room air. Patient has indwelling forde and has been irrigated as needed and as charted. No acute changes at this time. Patient got up to chair this morning and diet has been modified to soft to chew. Bed in lowest position, call light within reach and bed alarm set and audible.

## 2024-01-26 NOTE — PLAN OF CARE
Goal Outcome Evaluation:  Plan of Care Reviewed With: patient        Progress: improving   AOX4, on room air, vital signs WNL

## 2024-01-26 NOTE — PROGRESS NOTES
[facility] to [facility]    [phone]   LOS: 4 days     Name: Anthony Gallegos  Age/Sex: 78 y.o. male  :  1945        PCP: Provider, No Known  Chief Complaint   Patient presents with    Dysuria    Altered Mental Status      Subjective   No acute events overnight.  Patient seen sitting in chair at bedside today.  He did seem to choke on a drink of water.  He recovered well.  Asked if he had been having this problem and he stated that he has not.  He just took a large sip.  Denies any chest pain or shortness of breath.    cefTRIAXone, 2,000 mg, Intravenous, Q24H  enoxaparin, 30 mg, Subcutaneous, Q24H  pantoprazole, 40 mg, Oral, Q AM  senna-docusate sodium, 2 tablet, Oral, BID  tamsulosin, 0.4 mg, Oral, Nightly           Objective   Vital Signs  Temp:  [97.9 °F (36.6 °C)-99 °F (37.2 °C)] 97.9 °F (36.6 °C)  Heart Rate:  [81-98] 98  Resp:  [18] 18  BP: (111-139)/(40-62) 114/51  Body mass index is 21.05 kg/m².    Intake/Output Summary (Last 24 hours) at 2024 1336  Last data filed at 2024 1316  Gross per 24 hour   Intake 2436.67 ml   Output 1550 ml   Net 886.67 ml     General: Alert, no acute distress.  Sitting in chair at bedside.  Slow to respond but oriented x 3.  Chronically ill and frail appearing.  ENT: No conjunctival injection or scleral icterus. Moist mucous membranes.   Neuro: Eyes open and moving in all directions, strength normal in all extremities, no focal deficits.   Lungs: Clear to auscultation bilaterally. No wheeze or crackles. No distress.   Heart: RRR, no murmurs. No edema.  Abdomen: Soft, non-tender, non-distended. Normal bowel sounds.   Ext: Warm and well-perfused. No edema.   Skin: No rashes or lesions. IV site without swelling or erythema.     Physical examination on 2024 is unchanged from yesterday's exam.    Results Review:       I reviewed the patient's new clinical results.  Results from last 7 days   Lab Units 24  0514 24  0725 24  0326  01/23/24  0458 01/22/24  0437 01/21/24  1852   WBC 10*3/mm3 15.73* 23.98* 15.86* 21.70* 15.81* 17.86*   HEMOGLOBIN g/dL 10.3* 10.9* 10.1* 11.8* 13.1 12.3*   PLATELETS 10*3/mm3 283 253 218 262 350 372     Results from last 7 days   Lab Units 01/26/24  0514 01/25/24  0725 01/24/24  0326 01/23/24  0418 01/22/24  0437 01/21/24  1852   SODIUM mmol/L 137 139 138 139 138 139   POTASSIUM mmol/L 3.6 3.7 4.0 4.7 4.5 4.7   CHLORIDE mmol/L 107 109* 107 105 101 100   CO2 mmol/L 19.7* 20.0* 21.9* 20.6* 23.4 26.0   BUN mg/dL 32* 35* 41* 45* 30* 27*   CREATININE mg/dL 1.50* 1.69* 1.64* 2.15* 1.58* 1.79*   CALCIUM mg/dL 7.5* 7.4* 7.1* 8.0* 8.8 9.5   MAGNESIUM mg/dL 2.3 2.2 2.4 2.5*  --   --    PHOSPHORUS mg/dL 1.6* 2.6 2.6  --   --   --    Estimated Creatinine Clearance: 43.2 mL/min (A) (by C-G formula based on SCr of 1.5 mg/dL (H)).      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       PLAN  Mr. Gallegos is a 78 y.o. male with a history of DISH, hypertension, ankylosing spondylitis and impaired mobility who presented to [facility] initially complaining of confusion and was found to have urinary tract infection and admitted to monitored unit.    -Urinary tract infection: CT scan of the abdomen and pelvis yesterday showed continued bladder distention and some evidence of possible pyelonephritis with hydronephrosis  and stranding.  Urine culture now growing Klebsiella.  Has been afebrile for greater than 24 hours.  Continue ceftriaxone 2 g every 24 hours x 7 days to cover for pyelonephritis.    -Urinary retention: Mariscal catheter was placed and urology was consulted.  Urine continues to be blood-tinged.  If the Mariscal continues to clog, may need to place larger urinary catheter.  Patient going to be discharged with Mariscal in place.    -Dysphagia: Patient with a witnessed episode of coughing while drinking water.  Going to consult SLP for further evaluation.  Appreciate their assistance.    -Leukocytosis: WBC decreased back to 16 K today. No obvious new source of infection.  Clinically patient appears to be improving, has been afebrile for over 24 hours.  Blood cultures remain no growth to date.  Given that patient is clinically improving, going to hold off on further workup for now.  Seems to now be responding to antibiotics.    -Acute renal failure, electrolyte derangements: Creatinine at 1.50 today, improved from yesterday.  Baseline is normal.  Phosphorus low at 1.6 today.  Avoid nephrotoxic agents including NSAIDs and contrast dyes.  Electrolyte placement protocol.  Monitor with daily BMP.  Nephrology consulted and following.  Appreciate their assistance.    -Anemia: Hgb 10.3 today, baseline appears around 12.  No signs of active bleeding.  Monitor with daily CBC and transfuse for Hgb less than 7.    -Lovenox for DVT prophylaxis  -Full code      Mireya Basilio MD  [facility]

## 2024-01-26 NOTE — PAYOR COMM NOTE
"Anthony Gallegos (78 y.o. Male)          U/D FOR [id number]    CONTACT FAX# [phone]         Date of Birth   1945    Social Security Number       Address   [address] [place] [address]  [facility] [address]    Home Phone   [phone]    MRN   [id number]       [facility]    Marital Status                               Admission Date   24    Admission Type   Emergency    Admitting Provider   Casie Reardon MD    Attending Provider   Mireya Basilio MD    Department, Room/Bed   [address], [address]/1       Discharge Date       Discharge Disposition       Discharge Destination                                 Attending Provider: Mireya Basilio MD    Allergies: No Known Allergies    Isolation: Contact   Infection: MRSA/History Only (23), Candida Auris (rule out) (24)   Code Status: CPR    Ht: 190.5 cm (75\")   Wt: 76.4 kg (168 lb 6.9 oz)    Admission Cmt: None   Principal Problem: Transient alteration of awareness [R40.4]                   Active Insurance as of 2024       Primary Coverage       Payor Plan Insurance Group Employer/Plan Group    AETNA MEDICARE REPLACEMENT AETNA MED ADV POS [id number]       Payor Plan Address Payor Plan Phone Number Payor Plan Fax Number Effective Dates    [address] [phone]  2023 - None Entered    [facility] [id number]         Subscriber Name Subscriber Birth Date Member ID       ANTHONY GALLEGOS 1945 [id number]                     Emergency Contacts        (Rel.) Home Phone Work Phone Mobile Phone    NATHALIE DON (Daughter) [phone] -- --    MAGY GALLEGOS [phone] -- --                 Physician Progress Notes (last 72 hours)        Mireya Basilio MD at 24 1336            [facility]    [phone]   LOS: 4 days     Name: Anthony Gallegos  Age/Sex: 78 y.o. male  :  1945        PCP: Provider, No Known  Chief Complaint   Patient presents with    " Dysuria    Altered Mental Status      Subjective   No acute events overnight.  Patient seen sitting in chair at bedside today.  He did seem to choke on a drink of water.  He recovered well.  Asked if he had been having this problem and he stated that he has not.  He just took a large sip.  Denies any chest pain or shortness of breath.    cefTRIAXone, 2,000 mg, Intravenous, Q24H  enoxaparin, 30 mg, Subcutaneous, Q24H  pantoprazole, 40 mg, Oral, Q AM  senna-docusate sodium, 2 tablet, Oral, BID  tamsulosin, 0.4 mg, Oral, Nightly           Objective   Vital Signs  Temp:  [97.9 °F (36.6 °C)-99 °F (37.2 °C)] 97.9 °F (36.6 °C)  Heart Rate:  [81-98] 98  Resp:  [18] 18  BP: (111-139)/(40-62) 114/51  Body mass index is 21.05 kg/m².    Intake/Output Summary (Last 24 hours) at 1/26/2024 1336  Last data filed at 1/26/2024 1316  Gross per 24 hour   Intake 2436.67 ml   Output 1550 ml   Net 886.67 ml     General: Alert, no acute distress.  Sitting in chair at bedside.  Slow to respond but oriented x 3.  Chronically ill and frail appearing.  ENT: No conjunctival injection or scleral icterus. Moist mucous membranes.   Neuro: Eyes open and moving in all directions, strength normal in all extremities, no focal deficits.   Lungs: Clear to auscultation bilaterally. No wheeze or crackles. No distress.   Heart: RRR, no murmurs. No edema.  Abdomen: Soft, non-tender, non-distended. Normal bowel sounds.   Ext: Warm and well-perfused. No edema.   Skin: No rashes or lesions. IV site without swelling or erythema.     Physical examination on 1/26/2024 is unchanged from yesterday's exam.    Results Review:       I reviewed the patient's new clinical results.  Results from last 7 days   Lab Units 01/26/24  0514 01/25/24  0725 01/24/24  0326 01/23/24  0458 01/22/24  0437 01/21/24  1852   WBC 10*3/mm3 15.73* 23.98* 15.86* 21.70* 15.81* 17.86*   HEMOGLOBIN g/dL 10.3* 10.9* 10.1* 11.8* 13.1 12.3*   PLATELETS 10*3/mm3 283 253 218 262 350 372     Results  from last 7 days   Lab Units 01/26/24  0514 01/25/24  0725 01/24/24  0326 01/23/24  0418 01/22/24  0437 01/21/24  1852   SODIUM mmol/L 137 139 138 139 138 139   POTASSIUM mmol/L 3.6 3.7 4.0 4.7 4.5 4.7   CHLORIDE mmol/L 107 109* 107 105 101 100   CO2 mmol/L 19.7* 20.0* 21.9* 20.6* 23.4 26.0   BUN mg/dL 32* 35* 41* 45* 30* 27*   CREATININE mg/dL 1.50* 1.69* 1.64* 2.15* 1.58* 1.79*   CALCIUM mg/dL 7.5* 7.4* 7.1* 8.0* 8.8 9.5   MAGNESIUM mg/dL 2.3 2.2 2.4 2.5*  --   --    PHOSPHORUS mg/dL 1.6* 2.6 2.6  --   --   --    Estimated Creatinine Clearance: 43.2 mL/min (A) (by C-G formula based on SCr of 1.5 mg/dL (H)).      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       PLAN  Mr. Gallegos is a 78 y.o. male with a history of DISH, hypertension, ankylosing spondylitis and impaired mobility who presented to [facility] initially complaining of confusion and was found to have urinary tract infection and admitted to monitored unit.    -Urinary tract infection: CT scan of the abdomen and pelvis yesterday showed continued bladder distention and some evidence of possible pyelonephritis with hydronephrosis and stranding.  Urine culture now growing Klebsiella.  Has been afebrile for greater than 24 hours.  Continue ceftriaxone 2 g every 24 hours x 7 days to cover for pyelonephritis.    -Urinary retention: Mariscal  catheter was placed and urology was consulted.  Urine continues to be blood-tinged.  If the Mariscal continues to clog, may need to place larger urinary catheter.  Patient going to be discharged with Mariscal in place.    -Dysphagia: Patient with a witnessed episode of coughing while drinking water.  Going to consult SLP for further evaluation.  Appreciate their assistance.    -Leukocytosis: WBC decreased back to 16 K today. No obvious new source of infection.  Clinically patient appears to be improving, has been afebrile for over 24 hours.  Blood cultures remain no growth to date.  Given that patient is clinically improving, going to hold off on further workup for now.  Seems to now be responding to antibiotics.    -Acute renal failure, electrolyte derangements: Creatinine at 1.50 today, improved from yesterday.  Baseline is normal.  Phosphorus low at 1.6 today.  Avoid nephrotoxic agents including NSAIDs and contrast dyes.  Electrolyte placement protocol.  Monitor with daily BMP.  Nephrology consulted and following.  Appreciate their assistance.    -Anemia: Hgb 10.3 today, baseline appears around 12.  No signs of active bleeding.  Monitor with daily CBC and transfuse for Hgb less than 7.    -Lovenox for DVT prophylaxis  -Full code      Mireya Basilio MD  [facility]           Electronically signed by Mireya Basilio MD at 24 1348       Thompson Novoa MD at 24 1012              [facility] Progress Note      Patient Name: Anthony Gallegos  : 1945  MRN: [id number]  Primary Care Physician:  Provider, No Known  Date of admission: 2024    Subjective     Interval History:   Follow-up acute kidney injury.      Patient is not feeling well but is not very specific, he has coudé catheter in place, urine output over the past 24 hours was 1650 cc  No chest pain or shortness of air, no orthopnea or PND, no nausea or vomiting.      Review of Systems:   As  noted above    Objective     Vitals:   Temp:  [98.8 °F (37.1 °C)-99 °F (37.2 °C)] 99 °F (37.2 °C)  Heart Rate:  [81-82] 81  Resp:  [18] 18  BP: (111-139)/(40-62) 111/57    Intake/Output Summary (Last 24 hours) at 1/26/2024 1012  Last data filed at 1/26/2024 0900  Gross per 24 hour   Intake 2076.67 ml   Output 1050 ml   Net 1026.67 ml       Physical Exam:    General Appearance: alert, awake, chronically ill, no acute distress  Skin: warm and dry  HEENT: Edentulous.    Neck: supple, no JVD  Lungs: Clear to auscultation, unlabored breathing effort.  Heart: RRR, no rub  Abdomen: soft, he had significant tenderness to palpation below his umbilicus, nondistended.  Normoactive bowel  : coude catheter  Extremities: no edema.  Neuro: Moving all extremities    Scheduled Meds:     cefTRIAXone, 2,000 mg, Intravenous, Q24H  enoxaparin, 30 mg, Subcutaneous, Q24H  pantoprazole, 40 mg, Oral, Q AM  senna-docusate sodium, 2 tablet, Oral, BID  tamsulosin, 0.4 mg, Oral, Nightly      IV Meds:          Results Reviewed:   I have personally reviewed the results from the time of this admission to 1/26/2024 10:12 EST     Results from last 7 days   Lab Units 01/26/24  0514 01/25/24  0725 01/24/24  0326 01/22/24  0437 01/21/24  1852   SODIUM mmol/L 137 139 138   < > 139   POTASSIUM mmol/L 3.6 3.7 4.0   < > 4.7   CHLORIDE mmol/L 107 109* 107   < > 100   CO2 mmol/L 19.7* 20.0* 21.9*   < > 26.0   BUN mg/dL 32* 35* 41*   < > 27*   CREATININE mg/dL 1.50* 1.69* 1.64*   < > 1.79*   CALCIUM mg/dL 7.5* 7.4* 7.1*   < > 9.5   BILIRUBIN mg/dL  --   --   --   --  0.5   ALK PHOS U/L  --   --   --   --  80   ALT (SGPT) U/L  --   --   --   --  22   AST (SGOT) U/L  --   --   --   --  47*   GLUCOSE mg/dL 95 88 98   < > 123*    < > = values in this interval not displayed.       Estimated Creatinine Clearance: 43.2 mL/min (A) (by C-G formula based on SCr of 1.5 mg/dL (H)).    Results from last 7 days   Lab Units 01/26/24  0514 01/25/24  0725 01/24/24  0326    MAGNESIUM mg/dL 2.3 2.2 2.4   PHOSPHORUS mg/dL 1.6* 2.6 2.6       Results from last 7 days   Lab Units 01/24/24  0326 01/23/24  0418   URIC ACID mg/dL 5.6 5.9       Results from last 7 days   Lab Units 01/26/24  0514 01/25/24  0725 01/24/24  0326 01/23/24  0458 01/22/24  0437   WBC 10*3/mm3 15.73* 23.98* 15.86* 21.70* 15.81*   HEMOGLOBIN g/dL 10.3* 10.9* 10.1* 11.8* 13.1   PLATELETS 10*3/mm3 283 253 218 262 350             Assessment / Plan     ASSESSMENT:  Acute kidney injury.  Nonoliguric.  Multifactorial including obstructive uropathy with requirement of coude catheter placement.  Taking nonsteroidals prior to admission.  Proteinuria predicted by protein creatinine ratio of 8 g.  Likely due to NSAIDs.  Today is 1.5 improving, he had significant proteinuria his protein to creatinine ratio was 8.47 g/g on admission with concern about glomerulonephritis.  2.   Altered mental status improving.  3.  Ankylosing spondylitis, uveitis.  On methotrexate as an outpatient..  4.  Klebsiella urinary tract infection.  Treated  5.  Hypertension.  Reasonably controlled.  6.  BPH with urinary retention.  History of retention in the past.  Coudé catheter placed and patient on Flomax.      PLAN:  Continue the same treat  I will recheck his protein to creatinine ratio again to determine if we need to do an evaluation for GN, but given his anemia I will check immunofixation  Surveillance labs      I reviewed the chart and other providers notes, reviewed labs.  I discussed the case with the patient.  Copied text in this note has been reviewed and is accurate as of 01/26/24.     Thank you for involving us in the care of Anthony Gallegos.  Please feel free to call with any questions.    Thompson Novoa MD  01/26/24  10:12 [facility]    [facility]  [phone]    Please note that portions of this note were completed with a voice recognition program. Copied portion of note reviewed and accurate as of 1/25/24.      Electronically signed by Thompson Novoa MD at 24 1017       Tosha Cummings MD at 24 1608              [facility] Progress Note      Patient Name: Anthony Gallegos  : 1945  MRN: [id number]  Primary Care Physician:  Provider, No Known  Date of admission: 2024    Subjective     Interval History:   Follow-up acute kidney injury.  Coudé catheter in place. retention.  Urine output 2.2 L.  Eating better.  Bowels moving.  Review of Systems:   As noted above    Objective     Vitals:   Temp:  [98.8 °F (37.1 °C)-101.1 °F (38.4 °C)] 98.8 °F (37.1 °C)  Heart Rate:  [74-85] 81  Resp:  [18] 18  BP: (124-142)/(52-72) 124/52    Intake/Output Summary (Last 24 hours) at 2024 1608  Last data filed at 2024 0900  Gross per 24 hour   Intake 1454.67 ml   Output 1520 ml   Net -65.33 ml       Physical Exam:    General Appearance: alert, more conversant and appropriate today.  No acute distress   Skin: warm and dry  HEENT: Edentulous.  Wearing dark glasses.  Neck: supple, no JVD  Lungs: Clear to auscultation bilaterally.  Heart: RRR, normal S1 and S2  Abdomen: soft, nontender, nondistended. +bs  : coude catheter  Extremities: no edema.  Neuro: Measured but appropriate answers to questions.      Scheduled Meds:     cefTRIAXone, 2,000 mg, Intravenous, Q24H  enoxaparin, 30 mg, Subcutaneous, Q24H  pantoprazole, 40 mg, Oral, Q AM  senna-docusate sodium, 2 tablet, Oral, BID  tamsulosin, 0.4 mg, Oral, Nightly      IV Meds:   sodium chloride, 100 mL/hr, Last Rate: 100 mL/hr (24 0520)        Results Reviewed:   I have personally reviewed the results from the time of this admission to 2024 16:08 EST     Results from last 7 days   Lab Units 24  0725 24  0326 24  0418 24  0437 24  7562   SODIUM mmol/L 139 138 139   < > 139   POTASSIUM mmol/L 3.7 4.0 4.7   < > 4.7   CHLORIDE mmol/L 109* 107 105   < > 100   CO2 mmol/L 20.0* 21.9* 20.6*   <  > 26.0   BUN mg/dL 35* 41* 45*   < > 27*   CREATININE mg/dL 1.69* 1.64* 2.15*   < > 1.79*   CALCIUM mg/dL 7.4* 7.1* 8.0*   < > 9.5   BILIRUBIN mg/dL  --   --   --   --  0.5   ALK PHOS U/L  --   --   --   --  80   ALT (SGPT) U/L  --   --   --   --  22   AST (SGOT) U/L  --   --   --   --  47*   GLUCOSE mg/dL 88 98 96   < > 123*    < > = values in this interval not displayed.       Estimated Creatinine Clearance: 38.3 mL/min (A) (by C-G formula based on SCr of 1.69 mg/dL (H)).    Results from last 7 days   Lab Units 01/25/24  0725 01/24/24  0326 01/23/24  0418   MAGNESIUM mg/dL 2.2 2.4 2.5*   PHOSPHORUS mg/dL 2.6 2.6  --        Results from last 7 days   Lab Units 01/24/24  0326 01/23/24  0418   URIC ACID mg/dL 5.6 5.9       Results from last 7 days   Lab Units 01/25/24  0725 01/24/24  0326 01/23/24  0458 01/22/24  0437 01/21/24  1852   WBC 10*3/mm3 23.98* 15.86* 21.70* 15.81* 17.86*   HEMOGLOBIN g/dL 10.9* 10.1* 11.8* 13.1 12.3*   PLATELETS 10*3/mm3 253 218 262 350 372             Assessment / Plan     ASSESSMENT:  Acute kidney injury.  Nonoliguric.  Multifactorial including obstructive uropathy with requirement of coude catheter placement.  Taking nonsteroidals prior to admission.  Proteinuria predicted by protein creatinine ratio of 8 g.  Likely due to NSAIDs.  Creatinine at plateau 1.6, azotemia better  with IV fluids and discontinuation of NSAIDs as well as placement of catheter. Hypotension yesterday likely reason for plateau .  2.  Altered mental status improving.  3.  Ankylosing spondylitis, uveitis.  On methotrexate as an outpatient..  4.  Klebsiella urinary tract infection.  Ceftriaxone day 4 of 7 days planned . Significant leukocytosis today.  Dr. Basilio addressing .  5.  Hypertension.  Better after atenolol dc yesterday .  6.  BPH with urinary retention.  History of retention in the past.  Coudé catheter placed and patient on Flomax.  PLAN:  DC IVF.   Thank you for involving us in the care of Anthony  El.  Please feel free to call with any questions.    Tosha Cummings MD  24  16:08 [facility]    [facility]  [phone]    Please note that portions of this note were completed with a voice recognition program. Copied portion of note reviewed and accurate as of 24.     Electronically signed by Tosha Cummings MD at 24 1626       Mireya Basilio MD at 24 4997            [facility]    [phone]   LOS: 3 days     Name: Anthony Gallegos  Age/Sex: 78 y.o. male  :  1945        PCP: Provider, No Known  Chief Complaint   Patient presents with    Dysuria    Altered Mental Status      Subjective   No acute events overnight.  Patient sitting up in chair at bedside.  States he is feeling pretty well.  No chest pain or shortness of breath.  Was febrile around 7 PM last night but no fever since.  Blood pressures have improved.    cefTRIAXone, 2,000 mg, Intravenous, Q24H  enoxaparin, 30 mg, Subcutaneous, Q24H  pantoprazole, 40 mg, Oral, Q AM  senna-docusate sodium, 2 tablet, Oral, BID  tamsulosin, 0.4 mg, Oral, Nightly      sodium chloride, 100 mL/hr, Last Rate: 100 mL/hr (24 0520)        Objective   Vital Signs  Temp:  [98.8 °F (37.1 °C)-101.1 °F (38.4 °C)] 98.8 °F (37.1 °C)  Heart Rate:  [74-85] 81  Resp:  [18] 18  BP: (124-142)/(52-72) 124/52  Body mass index is 21.05 kg/m².    Intake/Output Summary (Last 24 hours) at 2024 1517  Last data filed at 2024 0900  Gross per 24 hour   Intake 1454.67 ml   Output 2170 ml   Net -715.33 ml     General: Alert, no acute distress.  Sitting in chair at bedside.  Slow to respond but oriented x 3.  Chronically ill and frail appearing.  ENT: No conjunctival injection or scleral icterus. Moist mucous membranes.   Neuro: Eyes open and moving in all directions, strength normal in all extremities, no focal deficits.   Lungs: Clear to auscultation bilaterally. No wheeze or crackles. No distress.   Heart:  RRR, no murmurs. No edema.  Abdomen: Soft, non-tender, non-distended. Normal bowel sounds.   Ext: Warm and well-perfused. No edema.   Skin: No rashes or lesions. IV site without swelling or erythema.     Results Review:       I reviewed the patient's new clinical results.  Results from last 7 days   Lab Units 01/25/24  0725 01/24/24  0326 01/23/24  0458 01/22/24  0437 01/21/24  1852   WBC 10*3/mm3 23.98* 15.86* 21.70* 15.81* 17.86*   HEMOGLOBIN g/dL 10.9* 10.1* 11.8* 13.1 12.3*   PLATELETS 10*3/mm3 253 218 262 350 372     Results from last 7 days   Lab Units 01/25/24  0725 01/24/24  0326 01/23/24  0418 01/22/24  0437 01/21/24  1852   SODIUM mmol/L 139 138 139 138 139   POTASSIUM mmol/L 3.7 4.0 4.7 4.5 4.7   CHLORIDE mmol/L 109* 107 105 101 100   CO2 mmol/L 20.0* 21.9* 20.6* 23.4 26.0   BUN mg/dL 35* 41* 45* 30* 27*   CREATININE mg/dL 1.69* 1.64* 2.15* 1.58* 1.79*   CALCIUM mg/dL 7.4* 7.1* 8.0* 8.8 9.5   MAGNESIUM mg/dL 2.2 2.4 2.5*  --   --    PHOSPHORUS mg/dL 2.6 2.6  --   --   --    Estimated Creatinine Clearance: 38.3 mL/min (A) (by C-G formula based on SCr of 1.69 mg/dL (H)).      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       PLAN  Mr. Gallegos is a 78 y.o. male with a history of DISH, hypertension, ankylosing spondylitis and impaired mobility who presented to [facility] initially  complaining of confusion and was found to have urinary tract infection and admitted to monitored unit.    -Urinary tract infection: CT scan of the abdomen and pelvis yesterday showed continued bladder distention and some evidence of possible pyelonephritis with hydronephrosis and stranding.  Urine culture now growing Klebsiella.  Has been afebrile for close to 24 hours.  Continue ceftriaxone 2 g every 24 hours x 7 days to cover for pyelonephritis.    -Urinary retention: Mariscal catheter was placed and urology was consulted.  Urine continues to be blood-tinged.  If the Mariscal continues to clog, may need to place larger urinary catheter.  Patient going to be discharged with Mariscal in place.    -Leukocytosis: WBC jumped from 16 K to 24 K today.  No obvious new source of infection.  Clinically patient appears to be improving, has been afebrile since last night.  Blood cultures remain no growth to date.  Given that patient is clinically improving, going to hold off on further workup for now.  Leukocytosis could be reactive.  If WBC is improved tomorrow or patient worsens clinically, repeat blood cultures and infectious workup.  Also broaden antibiotic coverage at that time.    -Acute renal failure: Creatinine at 1.69 today, stable from yesterday.  Baseline is normal.  Avoid nephrotoxic agents including NSAIDs and contrast dyes.  Monitor with daily BMP.  Nephrology consulted and following.  Appreciate their assistance.    -Anemia: Hgb 10.9, baseline appears around 12.  No signs of active bleeding.  Monitor with daily CBC and transfuse for Hgb less than 7.    -Lovenox for DVT prophylaxis  -Full code    Patient and all medical problems new to me today.  Extensive chart review performed.  Initially came in for altered mental status and found to have urinary tract infection.  Mental status seems to have returned to baseline at this point.  I think he is nearing readiness for discharge.  Going to treat with 7 days of antibiotics  for pyelonephritis.  His WBC did jump quite significantly to 24K today.  The patient seems to be improving clinically.  Going to hold off on further extensive infectious workup at this point given clinical improvement, but low threshold to broaden antibiotic should the patient change clinically in any way.  Repeat CBC with morning labs.  If WBC is trending down, patient can likely discharge sometime over the weekend.    Mireya Basilio MD  [facility]           Electronically signed by Mireya Basilio MD at 24 1540       Tani Lopez MD at 24 0604            [facility] UROLOGY DAILY PROGRESS NOTE      Name: Anthony Gallegos  Age: 78 y.o.  Sex: male  :  1945  MRN: [id number]    Date: 2024             Subjective:  Interval History:   Clogging of catheter overnight - irrigated and drained 500 mL red-tinged urine    Objective:    Scheduled Meds:cefTRIAXone, 2,000 mg, Intravenous, Q24H  enoxaparin, 30 mg, Subcutaneous, Q24H  pantoprazole, 40 mg, Oral, Q AM  senna-docusate sodium, 2 tablet, Oral, BID  tamsulosin, 0.4 mg, Oral, Nightly      Continuous Infusions:sodium chloride, 100 mL/hr, Last Rate: 100 mL/hr (24 0520)      PRN Meds:  acetaminophen    senna-docusate sodium **AND** polyethylene glycol **AND** bisacodyl **AND** bisacodyl    melatonin    ondansetron ODT **OR** ondansetron    Potassium Replacement - Follow Nurse / BPA Driven Protocol    Insert Peripheral IV **AND** sodium chloride    Vital signs in last 24 hours:  Temp:  [98 °F (36.7 °C)-101.1 °F (38.4 °C)] 98.8 °F (37.1 °C)  Heart Rate:  [66-85] 85  Resp:  [18] 18  BP: ()/(51-72) 142/72    Intake/Output:    Intake/Output Summary (Last 24 hours) at 2024 0604  Last data filed at 2024 0421  Gross per 24 hour   Intake 2316.67 ml   Output 1900 ml   Net 416.67 ml       Exam:  /72 (BP Location: Right arm, Patient Position: Lying)   Pulse 85   Temp 98.8 °F (37.1 °C) (Oral)   Resp 18   Ht  "190.5 cm (75\")   Wt 76.4 kg (168 lb 6.9 oz)   SpO2 98%   BMI 21.05 kg/m²     General Appearance:    Alert, cooperative, no acute distress   Back:     Symmetric, no CVA tenderness   Lungs:     Respirations unlabored   Heart:    Regular rate and rhythm   Abdomen:    Soft   Genitalia:   Mariscal red-tinged   Extremities:   Extremities normal, atraumatic, no cyanosis or edema   Skin:   Skin color, texture, turgor normal, no rashes or lesions        Data Review:  All labs (24hrs):   No results found for this or any previous visit (from the past 24 hour(s)).       Assessment:    Transient alteration of awareness    Hypertension    DISH (disseminated idiopathic skeletal hyperostosis)    Generalized weakness    Altered mental status    GERD without esophagitis    BPH (benign prostatic hyperplasia)    Leukocytosis    UTI (urinary tract infection)    Dehydration    MARTITA (acute kidney injury)    Hyperglycemia    Altered mental state    Urinary retention  UTI  BPH  MARTITA    RASHEED 2024  - 1. No hydronephrosis seen on the current study.  2. Thick-walled appearance to the urinary bladder, as well as debris  within the bladder, suggesting cystitis.    Cr 0.7 ->1.79 -> 1.58 -> 2.15 -> 1.64    Plan:    - Mariscal catheter has decompressed lower and upper urinary tract  - If Mariscal continues to clog, we'll have Nurses exchange for slightly larger catheter  - maintain indwelling Mariscal catheter indefinitely  - Discussion to be held as outpatient regarding when Mariscal may be removed versus other interventions    Tani Lopez MD  2024  06:04 EST     Electronically signed by Tani Lopez MD at 24 0608       Tosha Cummings MD at 24 1328              [facility] Progress Note      Patient Name: Anthony Gallegos  : 1945  MRN: [id number]  Primary Care Physician:  Provider, No Known  Date of admission: 2024    Subjective     Interval History:   Follow-up acute kidney injury.  " Coudé catheter placed yesterday for urinary retention.  Urine output 2.2 L.  Intake not recorded.  On IV fluids.Urine bloody.  Tried to eat lunch but did not like the food.  Review of Systems:   As noted above    Objective     Vitals:   Temp:  [98 °F (36.7 °C)-99.5 °F (37.5 °C)] 98.1 °F (36.7 °C)  Heart Rate:  [66-82] 73  Resp:  [18] 18  BP: ()/(42-60) 102/54    Intake/Output Summary (Last 24 hours) at 1/24/2024 1328  Last data filed at 1/24/2024 0935  Gross per 24 hour   Intake --   Output 2325 ml   Net -2325 ml       Physical Exam:    General Appearance: alert, oriented x 3, no acute distress   Skin: warm and dry  HEENT: Edentulous.  Wearing dark glasses.  Neck: supple, no JVD  Lungs: Clear to auscultation bilaterally.  Heart: RRR, normal S1 and S2  Abdomen: soft, nontender, nondistended. +bs  : coude catheter  Extremities: no edema.  Neuro: normal speech and mental status     Scheduled Meds:     atenolol, 50 mg, Oral, Q24H  cefTRIAXone, 2,000 mg, Intravenous, Q24H  enoxaparin, 30 mg, Subcutaneous, Q24H  pantoprazole, 40 mg, Oral, Q AM  senna-docusate sodium, 2 tablet, Oral, BID  tamsulosin, 0.4 mg, Oral, Nightly      IV Meds:   sodium chloride, 100 mL/hr, Last Rate: 100 mL/hr (01/24/24 0855)        Results Reviewed:   I have personally reviewed the results from the time of this admission to 1/24/2024 13:28 EST     Results from last 7 days   Lab Units 01/24/24  0326 01/23/24  0418 01/22/24  0437 01/21/24  1852   SODIUM mmol/L 138 139 138 139   POTASSIUM mmol/L 4.0 4.7 4.5 4.7   CHLORIDE mmol/L 107 105 101 100   CO2 mmol/L 21.9* 20.6* 23.4 26.0   BUN mg/dL 41* 45* 30* 27*   CREATININE mg/dL 1.64* 2.15* 1.58* 1.79*   CALCIUM mg/dL 7.1* 8.0* 8.8 9.5   BILIRUBIN mg/dL  --   --   --  0.5   ALK PHOS U/L  --   --   --  80   ALT (SGPT) U/L  --   --   --  22   AST (SGOT) U/L  --   --   --  47*   GLUCOSE mg/dL 98 96 108* 123*       Estimated Creatinine Clearance: 37.8 mL/min (A) (by C-G formula based on SCr of 1.64  mg/dL (H)).    Results from last 7 days   Lab Units 24  0326 24  0418   MAGNESIUM mg/dL 2.4 2.5*   PHOSPHORUS mg/dL 2.6  --        Results from last 7 days   Lab Units 24  0326 24  0418   URIC ACID mg/dL 5.6 5.9       Results from last 7 days   Lab Units 24  0326 24  0458 24  0437 24  1852   WBC 10*3/mm3 15.86* 21.70* 15.81* 17.86*   HEMOGLOBIN g/dL 10.1* 11.8* 13.1 12.3*   PLATELETS 10*3/mm3 218 262 350 372             Assessment / Plan     ASSESSMENT:  Acute kidney injury.  Nonoliguric.  Multifactorial including obstructive uropathy with requirement of a catheter placement.  Taking nonsteroidals prior to admission.  Proteinuria predicted by protein creatinine ratio of 8 g.  Likely due to NSAIDs.  Creatinine improving with IV fluids and discontinuation of NSAIDs as well as placement of catheter.  2.  Altered mental status improving.  3.  Ankylosing spondylitis, uveitis.  On methotrexate as an outpatient..  4.  Klebsiella urinary tract infection.  Ceftriaxone day 3 of 5.  5.  Hypertension.  Too controlled.  Stop atenolol unless there is another indication.  6.  BPH with urinary retention.  History of retention in the past.  Coudé catheter placed and patient on Flomax.  PLAN:  Discontinue atenolol if okay with Dr. Haque.  Monitor chemistries.  3.  RN instructed to record IV fluid intake.  Thank you for involving us in the care of Anthony Gallegos.  Please feel free to call with any questions.    Tosha Cummings MD  24  13:28 EST    [facility]  [phone]    Please note that portions of this note were completed with a voice recognition program.    Electronically signed by Tosha Cummings MD at 24 [phone]       Shola Haque MD at 24 [phone]            [facility]    [phone]   LOS: 2 days     Name: Anthony Gallegos  Age/Sex: 78 y.o. male  :  1945        PCP: Provider, No Known  Chief  Complaint   Patient presents with    Dysuria    Altered Mental Status      Subjective   Observed working with physical therapy today.  He is doing okay from that perspective.  Denies other new issues or complaints right now.  General: No Fever or Chills, Cardiac: No Chest Pain or Palpitations, Resp: No Cough or SOA, GI: No Nausea, Vomiting, or Diarrhea, and Other: No bleeding    atenolol, 50 mg, Oral, Q24H  cefTRIAXone, 2,000 mg, Intravenous, Q24H  enoxaparin, 30 mg, Subcutaneous, Q24H  pantoprazole, 40 mg, Oral, Q AM  senna-docusate sodium, 2 tablet, Oral, BID  tamsulosin, 0.4 mg, Oral, Nightly      sodium chloride, 100 mL/hr, Last Rate: 100 mL/hr (01/24/24 0855)        Objective   Vital Signs  Temp:  [98 °F (36.7 °C)-99.5 °F (37.5 °C)] 98 °F (36.7 °C)  Heart Rate:  [66-82] 66  Resp:  [18] 18  BP: ()/(42-60) 96/51  Body mass index is 20.17 kg/m².    Intake/Output Summary (Last 24 hours) at 1/24/2024 1226  Last data filed at 1/24/2024 0935  Gross per 24 hour   Intake --   Output 2325 ml   Net -2325 ml       Physical Exam  Vitals and nursing note reviewed.   Constitutional:       General: He is not in acute distress.     Appearance: He is ill-appearing.   Cardiovascular:      Rate and Rhythm: Normal rate and regular rhythm.   Pulmonary:      Effort: No respiratory distress.      Breath sounds: Normal breath sounds.   Abdominal:      General: Bowel sounds are normal.      Palpations: Abdomen is soft.   Neurological:      General: No focal deficit present.      Mental Status: He is alert. Mental status is at baseline.           Results Review:       I reviewed the patient's new clinical results.  Results from last 7 days   Lab Units 01/24/24  0326 01/23/24  0458 01/22/24  0437 01/21/24  1852   WBC 10*3/mm3 15.86* 21.70* 15.81* 17.86*   HEMOGLOBIN g/dL 10.1* 11.8* 13.1 12.3*   PLATELETS 10*3/mm3 218 262 350 372     Results from last 7 days   Lab Units 01/24/24  0326 01/23/24  0418 01/22/24  0437 01/21/24  1852    SODIUM mmol/L 138 139 138 139   POTASSIUM mmol/L 4.0 4.7 4.5 4.7   CHLORIDE mmol/L 107 105 101 100   CO2 mmol/L 21.9* 20.6* 23.4 26.0   BUN mg/dL 41* 45* 30* 27*   CREATININE mg/dL 1.64* 2.15* 1.58* 1.79*   CALCIUM mg/dL 7.1* 8.0* 8.8 9.5   MAGNESIUM mg/dL 2.4 2.5*  --   --    PHOSPHORUS mg/dL 2.6  --   --   --    Estimated Creatinine Clearance: 37.8 mL/min (A) (by C-G formula based on SCr of 1.64 mg/dL (H)).      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       PLAN  Mr. Gallegos is a 78 y.o. male with a history of DISH, hypertension, ankylosing spondylitis and impaired mobility who presented to [facility] initially complaining of confusion and was found to have urinary tract infection and admitted to monitored unit  -His white blood cell count remains elevated.  CT scan of the abdomen and pelvis yesterday showed continued bladder distention and some evidence of possible pyelonephritis with hydronephrosis and stranding.  Mariscal catheter was placed and urology was consulted.  Urine culture is now growing Klebsiella but is sensitive to current antibiotics.  -Given worsening renal function will asked nephrology to evaluate.  Baseline creatinine around 0.8 peaked on this admission at 2.1 today it has improved to 1.6 appreciate nephrology input  -His mental status for me seems back to baseline.  He  "remembers me from prior hospitalization  -Lovenox for DVT prophylaxis  -Full code      Disposition  Expected Discharge Date: 1/24/2024; Expected Discharge Time:        Shola Haque MD  [facility]  01/24/24  12:26 EST            Electronically signed by Shola Haque MD at 01/24/24 1228       Walter Jimenez Jr., MD at 01/24/24 0895             LOS: 2 days   Patient Care Team:  Provider, No Known as PCP - General      Subjective   Interval History: Comfortable, no problems with catheter overnight.    Objective     ROS   12 POINT NEG ROS PERTINENT IN HPI      Vital Signs  Temp:  [98 °F (36.7 °C)-99.5 °F (37.5 °C)] 98 °F (36.7 °C)  Heart Rate:  [66-82] 66  Resp:  [18] 18  BP: ()/(42-60) 96/51      Intake/Output Summary (Last 24 hours) at 1/24/2024 0826  Last data filed at 1/24/2024 0541  Gross per 24 hour   Intake --   Output 2225 ml   Net -2225 ml       Flowsheet Rows      Flowsheet Row First Filed Value   Admission Height 190.5 cm (75\") Documented at 01/21/2024 1744   Admission Weight 81.6 kg (180 lb) Documented at 01/21/2024 1744            Physical Exam:     General appearance: alert, cooperative, oriented  Mariscal intact, urine light pink      Lab Results (all)       Procedure Component Value Units Date/Time    Blood Culture - Blood, Hand, Right [[id number]]  (Normal) Collected: 01/22/24 0437    Specimen: Blood from Hand, Right Updated: 01/24/24 0500     Blood Culture No growth at 2 days    Narrative:      Less than seven (7) mL's of blood was collected.  Insufficient quantity may yield false negative results.    Renal Function Panel [[id number]]  (Abnormal) Collected: 01/24/24 0326    Specimen: Blood Updated: 01/24/24 0416     Glucose 98 mg/dL      BUN 41 mg/dL      Creatinine 1.64 mg/dL      Sodium 138 mmol/L      Potassium 4.0 mmol/L      Chloride 107 mmol/L      CO2 21.9 mmol/L      Calcium 7.1 mg/dL      Albumin 1.8 g/dL      Phosphorus 2.6 mg/dL      Anion Gap 9.1 " mmol/L      BUN/Creatinine Ratio 25.0     eGFR 42.3 mL/min/1.73     Narrative:      GFR Normal >60  Chronic Kidney Disease <60  Kidney Failure <15    The GFR formula is only valid for adults with stable renal function between ages 18 and 70.    Uric Acid [[id number]]  (Normal) Collected: 01/24/24 0326    Specimen: Blood Updated: 01/24/24 0400     Uric Acid 5.6 mg/dL     Magnesium [[id number]]  (Normal) Collected: 01/24/24 0326    Specimen: Blood Updated: 01/24/24 0400     Magnesium 2.4 mg/dL     CBC & Differential [[id number]]  (Abnormal) Collected: 01/24/24 0326    Specimen: Blood Updated: 01/24/24 0341    Narrative:      The following orders were created for panel order CBC & Differential.  Procedure                               Abnormality         Status                     ---------                               -----------         ------                     CBC Auto Differential[[id number]]        Abnormal            Final result                 Please view results for these tests on the individual orders.    CBC Auto Differential [[id number]]  (Abnormal) Collected: 01/24/24 0326    Specimen: Blood Updated: 01/24/24 0341     WBC 15.86 10*3/mm3      RBC 3.28 10*6/mm3      Hemoglobin 10.1 g/dL      Hematocrit 29.3 %      MCV 89.3 fL      MCH 30.8 pg      MCHC 34.5 g/dL      RDW 13.0 %      RDW-SD 42.0 fl      MPV 9.1 fL      Platelets 218 10*3/mm3      Neutrophil % 86.1 %      Lymphocyte % 5.2 %      Monocyte % 6.5 %      Eosinophil % 0.6 %      Basophil % 0.3 %      Immature Grans % 1.3 %      Neutrophils, Absolute 13.65 10*3/mm3      Lymphocytes, Absolute 0.83 10*3/mm3      Monocytes, Absolute 1.03 10*3/mm3      Eosinophils, Absolute 0.10 10*3/mm3      Basophils, Absolute 0.04 10*3/mm3      Immature Grans, Absolute 0.21 10*3/mm3      nRBC 0.0 /100 WBC     Blood Culture - Blood, Hand, Right [[id number]]  (Normal) Collected: 01/21/24 2138    Specimen: Blood from Hand, Right Updated: 01/23/24 2200     Blood Culture No  growth at 2 days    Eosinophil Smear - Urine, Urine, Clean Catch [[id number]]  (Normal) Collected: 01/23/24 1454    Specimen: Urine, Clean Catch Updated: 01/23/24 1634     Eosinophil Smear 0 % EOS/100 Cells     Protein / Creatinine Ratio, Urine - Indwelling Urethral Catheter [[id number]]  (Abnormal) Collected: 01/23/24 1454    Specimen: Urine from Indwelling Urethral Catheter Updated: 01/23/24 1544     Protein/Creatinine Ratio, Urine 8,472.5 mg/G Crea      Creatinine, Urine 34.5 mg/dL      Total Protein, Urine 292.3 mg/dL     Chloride, Urine, Random - Indwelling Urethral Catheter [[id number]] Collected: 01/23/24 1454    Specimen: Urine from Indwelling Urethral Catheter Updated: 01/23/24 1533     Chloride, Urine 75 mmol/L     Narrative:      Reference intervals for random urine have not been established.  Clinical usage is dependent upon physician's interpretation in combination with other laboratory tests.       Sodium, Urine, Random - Urine, Clean Catch [[id number]] Collected: 01/23/24 1454    Specimen: Urine, Clean Catch Updated: 01/23/24 1532     Sodium, Urine 107 mmol/L     Narrative:      Reference intervals for random urine have not been established.  Clinical usage is dependent upon physician's interpretation in combination with other laboratory tests.       Urea Nitrogen, Urine - Urine, Clean Catch [[id number]] Collected: 01/23/24 1454    Specimen: Urine, Clean Catch Updated: 01/23/24 1532     Urea Nitrogen, Urine 273 mg/dL     Narrative:      Reference intervals for random urine have not been established.  Clinical usage is dependent upon physician's interpretation in combination with other laboratory tests.       Creatinine Urine Random (kidney function) GFR component - Urine, Clean Catch [[id number]] Collected: 01/23/24 1454    Specimen: Urine, Clean Catch Updated: 01/23/24 1532     Creatinine, Urine 39.6 mg/dL     Narrative:      Reference intervals for random urine have not been established.  Clinical usage  is dependent upon physician's interpretation in combination with other laboratory tests.       NEHEMIAH AURIS SCREEN - Swab, Axilla Right, Axilla Left and Groin [[id number]] Collected: 01/23/24 1455    Specimen: Swab from Axilla Right, Axilla Left and Groin Updated: 01/23/24 1509    Urine Culture - Urine, Urine, Clean Catch [[id number]]  (Abnormal) Collected: 01/21/24 2140    Specimen: Urine, Clean Catch Updated: 01/23/24 0953     Urine Culture >100,000 CFU/mL Gram Negative Bacilli    Narrative:      Colonization of the urinary tract without infection is common. Treatment is discouraged unless the patient is symptomatic, pregnant, or undergoing an invasive urologic procedure.    Uric Acid [[id number]]  (Normal) Collected: 01/23/24 0418    Specimen: Blood Updated: 01/23/24 0911     Uric Acid 5.9 mg/dL     Manual Differential [[id number]]  (Abnormal) Collected: 01/23/24 0458    Specimen: Blood Updated: 01/23/24 0551     Neutrophil % 94.0 %      Lymphocyte % 3.0 %      Monocyte % 3.0 %      Neutrophils Absolute 20.40 10*3/mm3      Lymphocytes Absolute 0.65 10*3/mm3      Monocytes Absolute 0.65 10*3/mm3      Anisocytosis Slight/1+     Dacrocytes Slight/1+     Elliptocytes Mod/2+     Macrocytes Slight/1+     Ovalocytes Mod/2+     Poikilocytes Large/3+     Polychromasia Large/3+     WBC Morphology Normal     Platelet Morphology Normal    Magnesium [[id number]]  (Abnormal) Collected: 01/23/24 0418    Specimen: Blood Updated: 01/23/24 0525     Magnesium 2.5 mg/dL     Basic Metabolic Panel [[id number]]  (Abnormal) Collected: 01/23/24 0418    Specimen: Blood Updated: 01/23/24 0525     Glucose 96 mg/dL      BUN 45 mg/dL      Creatinine 2.15 mg/dL      Sodium 139 mmol/L      Potassium 4.7 mmol/L      Chloride 105 mmol/L      CO2 20.6 mmol/L      Calcium 8.0 mg/dL      BUN/Creatinine Ratio 20.9     Anion Gap 13.4 mmol/L      eGFR 30.6 mL/min/1.73     Narrative:      GFR Normal >60  Chronic Kidney Disease <60  Kidney Failure <15    The  GFR formula is only valid for adults with stable renal function between ages 18 and 70.    Hemoglobin A1c [[id number]]  (Abnormal) Collected: 01/23/24 0418    Specimen: Blood Updated: 01/23/24 0516     Hemoglobin A1C 5.80 %     Narrative:      Hemoglobin A1C Ranges:    Increased Risk for Diabetes  5.7% to 6.4%  Diabetes                     >= 6.5%  Diabetic Goal                < 7.0%    CBC & Differential [[id number]]  (Abnormal) Collected: 01/23/24 0458    Specimen: Blood Updated: 01/23/24 0514    Narrative:      The following orders were created for panel order CBC & Differential.  Procedure                               Abnormality         Status                     ---------                               -----------         ------                     CBC Auto Differential[[id number]]        Abnormal            Final result                 Please view results for these tests on the individual orders.    CBC Auto Differential [[id number]]  (Abnormal) Collected: 01/23/24 0458    Specimen: Blood Updated: 01/23/24 0514     WBC 21.70 10*3/mm3      RBC 3.79 10*6/mm3      Hemoglobin 11.8 g/dL      Hematocrit 34.6 %      MCV 91.3 fL      MCH 31.1 pg      MCHC 34.1 g/dL      RDW 13.0 %      RDW-SD 42.6 fl      MPV 8.8 fL      Platelets 262 10*3/mm3      nRBC 0.0 /100 WBC     Basic Metabolic Panel [[id number]]  (Abnormal) Collected: 01/22/24 0437    Specimen: Blood Updated: 01/22/24 0514     Glucose 108 mg/dL      BUN 30 mg/dL      Creatinine 1.58 mg/dL      Sodium 138 mmol/L      Potassium 4.5 mmol/L      Chloride 101 mmol/L      CO2 23.4 mmol/L      Calcium 8.8 mg/dL      BUN/Creatinine Ratio 19.0     Anion Gap 13.6 mmol/L      eGFR 44.2 mL/min/1.73     Narrative:      GFR Normal >60  Chronic Kidney Disease <60  Kidney Failure <15    The GFR formula is only valid for adults with stable renal function between ages 18 and 70.    CBC (No Diff) [[id number]]  (Abnormal) Collected: 01/22/24 0437    Specimen: Blood Updated: 01/22/24  0457     WBC 15.81 10*3/mm3      RBC 4.43 10*6/mm3      Hemoglobin 13.1 g/dL      Hematocrit 39.8 %      MCV 89.8 fL      MCH 29.6 pg      MCHC 32.9 g/dL      RDW 12.8 %      RDW-SD 41.6 fl      MPV 8.5 fL      Platelets 350 10*3/mm3     Urinalysis, Microscopic Only - Urine, Clean Catch [[id number]]  (Abnormal) Collected: 01/21/24 2140    Specimen: Urine, Clean Catch Updated: 01/21/24 2218     RBC, UA       Unable to determine due to loaded field     /HPF     WBC, UA Too Numerous to Count /HPF      Bacteria, UA       Unable to determine due to loaded field     /HPF     Squamous Epithelial Cells, UA       Unable to determine due to loaded field     /HPF     Hyaline Casts, UA       Unable to determine due to loaded field     /LPF     Methodology Manual Light Microscopy    Urinalysis With Culture If Indicated - Urine, Clean Catch [[id number]]  (Abnormal) Collected: 01/21/24 2140    Specimen: Urine, Clean Catch Updated: 01/21/24 2211     Color, UA Dark Yellow     Appearance, UA Turbid     pH, UA 5.5     Specific Gravity, UA 1.021     Glucose, UA Negative     Ketones, UA Negative     Bilirubin, UA Negative     Blood, UA Large (3+)     Protein, UA >=300 mg/dL (3+)     Leuk Esterase, UA Large (3+)     Nitrite, UA Positive     Urobilinogen, UA 1.0 E.U./dL    Narrative:      In absence of clinical symptoms, the presence of pyuria, bacteria, and/or nitrites on the urinalysis result does not correlate with infection.    Lactic Acid, Plasma [[id number]]  (Normal) Collected: 01/21/24 2135    Specimen: Blood Updated: 01/21/24 2203     Lactate 1.8 mmol/L     CBC & Differential [[id number]]  (Abnormal) Collected: 01/21/24 1852    Specimen: Blood Updated: 01/21/24 1930    Narrative:      The following orders were created for panel order CBC & Differential.  Procedure                               Abnormality         Status                     ---------                               -----------         ------                     CBC Auto  Differential[[id number]]        Abnormal            Final result                 Please view results for these tests on the individual orders.    CBC Auto Differential [[id number]]  (Abnormal) Collected: 01/21/24 1852    Specimen: Blood Updated: 01/21/24 1930     WBC 17.86 10*3/mm3      RBC 4.16 10*6/mm3      Hemoglobin 12.3 g/dL      Hematocrit 38.3 %      MCV 92.1 fL      MCH 29.6 pg      MCHC 32.1 g/dL      RDW 13.0 %      RDW-SD 42.9 fl      MPV 9.2 fL      Platelets 372 10*3/mm3     Manual Differential [[id number]]  (Abnormal) Collected: 01/21/24 1852    Specimen: Blood Updated: 01/21/24 1930     Neutrophil % 90.0 %      Lymphocyte % 5.0 %      Monocyte % 5.0 %      Neutrophils Absolute 16.07 10*3/mm3      Lymphocytes Absolute 0.89 10*3/mm3      Monocytes Absolute 0.89 10*3/mm3      RBC Morphology Normal     WBC Morphology Normal     Platelet Morphology Normal    Comprehensive Metabolic Panel [[id number]]  (Abnormal) Collected: 01/21/24 1852    Specimen: Blood Updated: 01/21/24 1926     Glucose 123 mg/dL      BUN 27 mg/dL      Creatinine 1.79 mg/dL      Sodium 139 mmol/L      Potassium 4.7 mmol/L      Comment: Slight hemolysis detected by analyzer. Result may be falsely elevated.        Chloride 100 mmol/L      CO2 26.0 mmol/L      Calcium 9.5 mg/dL      Total Protein 7.9 g/dL      Albumin 3.3 g/dL      ALT (SGPT) 22 U/L      AST (SGOT) 47 U/L      Comment: Slight hemolysis detected by analyzer. Result may be falsely elevated.        Alkaline Phosphatase 80 U/L      Total Bilirubin 0.5 mg/dL      Globulin 4.6 gm/dL      A/G Ratio 0.7 g/dL      BUN/Creatinine Ratio 15.1     Anion Gap 13.0 mmol/L      eGFR 38.1 mL/min/1.73     Narrative:      GFR Normal >60  Chronic Kidney Disease <60  Kidney Failure <15    The GFR formula is only valid for adults with stable renal function between ages 18 and 70.            Imaging Results (All)       Procedure Component Value Units Date/Time    CT Abdomen Pelvis Without Contrast  [[id number]] Collected: 01/23/24 0959     Updated: 01/23/24 1005    Narrative:      CT ABDOMEN AND PELVIS WITHOUT IV CONTRAST     HISTORY: Pyelonephritis versus perinephric abscess.     TECHNIQUE:  CT includes axial imaging from the lung bases to the  trochanters without intravenous contrast and without use of oral  contrast. Data reconstructed in coronal and sagittal planes. Radiation  dose reduction techniques were utilized, including automated exposure  control and exposure modulation based on body size.     COMPARISON: CT abdomen and pelvis 12/20/2023.     FINDINGS: There is mild linear subsegmental basal atelectasis. Liver,  spleen, pancreas appear grossly normal allowing for limitation without  IV contrast and due to streak artifact from patient being scanned with  his arms to his side. There is mild bilateral adrenal thickening,  greater on the left without change.     A right lower pole renal exophytic low-density lesion is most likely a  cyst and measures 5.6 cm and this was also present on the previous exam  12/20/2023. There is very mild hydronephrosis that is greater on the  right and new when compared to the prior exam. Mild hydroureter is  present to the level of the urinary bladder. There is general bladder  distention with generalized urinary bladder wall thickening consistent  with cystitis. [place] anterior bladder dome diverticulum is present  just posterior to the level of umbilicus. Pericystic stranding is  present.     There is no bowel dilatation or evidence for bowel obstruction. Small  periumbilical hernia contains fat.     There is syndesmophyte formation within the lower thoracic spine and  lumbar spine with bony fusion of the vertebral bodies. There is also  bony fusion of the spinous processes, facet joints and there is  ankylosis of the sacroiliac joints.     Left total hip arthroplasty is present associated with beam-hardening  artifact. There is mild generalized body wall edema.         Impression:      1. Generalized urinary bladder wall distention with wall thickening  consistent with cystitis and urinary retention. Anterior bladder dome  diverticulum. Mild hydronephrosis, greater on the right, potential  pyelonephritis.  2. 5.6 cm right lower pole exophytic low-density lesion is consistent  with a cyst and is without change.  3. Mild generalized body wall edema.  4. Small periumbilical hernia contains fat.  5. Osseous findings suggest ankylosing spondylitis.     Radiation dose reduction techniques were utilized, including automated  exposure control and exposure modulation based on body size.        This report was finalized on 1/23/2024 10:02 AM by Dr. Bobby Eason M.D on Workstation: [id number]       XR Chest 1 View [[id number]] Collected: 01/21/24 1859     Updated: 01/21/24 1902    Narrative:      PORTABLE CHEST X-RAY     HISTORY: Altered mental status.     Portable chest x-ray is provided. Correlation: December 20, 2023 and  June 21, 2017.     FINDINGS: The cardiomediastinal silhouette is normal. The lungs are  clear. The costophrenic sulci are dry and the bones appear normal. There  is no pneumothorax.       Impression:      Negative.     This report was finalized on 1/21/2024 6:59 PM by Dr. Walter Henderson M.D on Workstation: [id number]       CT Head Without Contrast [[id number]] Collected: 01/21/24 1853     Updated: 01/21/24 1859    Narrative:      HEAD CT     HISTORY: Confusion.     TECHNIQUE: Noncontrast head CT is provided. Comparison: Head CT December 20, 2023.     FINDINGS: The ventricles are normal in caliber. There is loss of  cerebral parenchymal volume with patchy low density in the frontal white  matter similar as observed previously. No new parenchymal abnormality is  present. There is no hemorrhage or acute ischemic change. The bones of  the skull appear normal. The mastoid air cells, middle ears, and  visualized paranasal sinuses are clear. Extensive atheromatous  vascular  calcification       Impression:      Chronic changes in the brain. No acute abnormality.        Radiation dose reduction techniques were utilized, including automated  exposure control and exposure modulation based on body size.        This report was finalized on 1/21/2024 6:56 PM by Dr. Walter Henderson M.D on Workstation: [id number]               Medication Review:   Current Facility-Administered Medications   Medication Dose Route Frequency Provider Last Rate Last Admin    acetaminophen (TYLENOL) tablet 650 mg  650 mg Oral Q4H PRN Casie Reardon MD   650 mg at 01/23/24 2017    atenolol (TENORMIN) tablet 50 mg  50 mg Oral Q24H Casie Reardon MD   50 mg at 01/23/24 0849    sennosides-docusate (PERICOLACE) 8.6-50 MG per tablet 2 tablet  2 tablet Oral BID Casie Reardon MD   2 tablet at 01/23/24 2017    And    polyethylene glycol (MIRALAX) packet 17 g  17 g Oral Daily PRN Casie Reardon MD        And    bisacodyl (DULCOLAX) EC tablet 5 mg  5 mg Oral Daily PRN Casie Reardon MD        And    bisacodyl (DULCOLAX) suppository 10 mg  10 mg Rectal Daily PRN Casie Reardon MD        Calcium Replacement - Follow Nurse / BPA Driven Protocol   Does not apply PRN Gian Chung DO        cefTRIAXone (ROCEPHIN) 2,000 mg in sodium chloride 0.9 % 100 mL IVPB-VTB  2,000 mg Intravenous Q24H Casie Reardon  mL/hr at 01/23/24 2017 2,000 mg at 01/23/24 2017    Enoxaparin Sodium (LOVENOX) syringe 30 mg  30 mg Subcutaneous Q24H Shola Haque MD        Magnesium Standard Dose Replacement - Follow Nurse / BPA Driven Protocol   Does not apply Gian Mitchell DO        melatonin tablet 3 mg  3 mg Oral Nightly PRN Casie Reardon MD   3 mg at 01/23/24 2017    ondansetron ODT (ZOFRAN-ODT) disintegrating tablet 4 mg  4 mg Oral Q6H PRN Casie Reardon MD        Or    ondansetron (ZOFRAN) injection 4 mg  4 mg Intravenous Q6H PRN Leol, Casie Araujo,  MD        pantoprazole (PROTONIX) EC tablet 40 mg  40 mg Oral Q AM Casie Reardon MD   40 mg at 01/24/24 0524    Phosphorus Replacement - Follow Nurse / BPA Driven Protocol   Does not apply PRN Gian Chung DO        Potassium Replacement - Follow Nurse / BPA Driven Protocol   Does not apply PRN Gian Chung DO        sodium chloride 0.9 % flush 10 mL  10 mL Intravenous PRN Jeffrey Jean PA        sodium chloride 0.9 % infusion  100 mL/hr Intravenous Continuous Casie Reardon  mL/hr at 01/23/24 0542 100 mL/hr at 01/23/24 0542    tamsulosin (FLOMAX) 24 hr capsule 0.4 mg  0.4 mg Oral Nightly Casie Reardon MD   0.4 mg at 01/23/24 2017       Current Facility-Administered Medications:     acetaminophen (TYLENOL) tablet 650 mg, 650 mg, Oral, Q4H PRN, Casie Reardon MD, 650 mg at 01/23/24 2017    atenolol (TENORMIN) tablet 50 mg, 50 mg, Oral, Q24H, Casie Reardon MD, 50 mg at 01/23/24 0849    sennosides-docusate (PERICOLACE) 8.6-50 MG per tablet 2 tablet, 2 tablet, Oral, BID, 2 tablet at 01/23/24 2017 **AND** polyethylene glycol (MIRALAX) packet 17 g, 17 g, Oral, Daily PRN **AND** bisacodyl (DULCOLAX) EC tablet 5 mg, 5 mg, Oral, Daily PRN **AND** bisacodyl (DULCOLAX) suppository 10 mg, 10 mg, Rectal, Daily PRN, Casie Reardon MD    Calcium Replacement - Follow Nurse / BPA Driven Protocol, , Does not apply, PRN, Gian Chung DO    cefTRIAXone (ROCEPHIN) 2,000 mg in sodium chloride 0.9 % 100 mL IVPB-VTB, 2,000 mg, Intravenous, Q24H, Casie Reardon MD, Last Rate: 200 mL/hr at 01/23/24 2017, 2,000 mg at 01/23/24 2017    Enoxaparin Sodium (LOVENOX) syringe 30 mg, 30 mg, Subcutaneous, Q24H, Shola Haque MD    Magnesium Standard Dose Replacement - Follow Nurse / BPA Driven Protocol, , Does not apply, PRN, Gian Chung,     melatonin tablet 3 mg, 3 mg, Oral, Nightly PRN, Casie Reardon MD, 3 mg at 01/23/24 2017    ondansetron ODT (ZOFRAN-ODT)  disintegrating tablet 4 mg, 4 mg, Oral, Q6H PRN **OR** ondansetron (ZOFRAN) injection 4 mg, 4 mg, Intravenous, Q6H PRN, Casie Reardon MD    pantoprazole (PROTONIX) EC tablet 40 mg, 40 mg, Oral, Q AM, Casie Reardon MD, 40 mg at 01/24/24 0524    Phosphorus Replacement - Follow Nurse / BPA Driven Protocol, , Does not apply, PRN, Gian Chung,     Potassium Replacement - Follow Nurse / BPA Driven Protocol, , Does not apply, PRN, Gian Chung,     Insert Peripheral IV, , , Once **AND** sodium chloride 0.9 % flush 10 mL, 10 mL, Intravenous, PRN, Jeffrey Jean PA    sodium chloride 0.9 % infusion, 100 mL/hr, Intravenous, Continuous, Casie Reardon MD, Last Rate: 100 mL/hr at 01/23/24 0542, 100 mL/hr at 01/23/24 0542    tamsulosin (FLOMAX) 24 hr capsule 0.4 mg, 0.4 mg, Oral, Nightly, Casie Reardon MD, 0.4 mg at 01/23/24 2017  Medications Discontinued During This Encounter   Medication Reason    pantoprazole (PROTONIX) EC tablet 40 mg     Pharmacy to Dose enoxaparin (LOVENOX)     Enoxaparin Sodium (LOVENOX) syringe 40 mg        Assessment & Plan   Acute kidney injury      Transient alteration of awareness    Hypertension    DISH (disseminated idiopathic skeletal hyperostosis)    Generalized weakness    Altered mental status    GERD without esophagitis    BPH (benign prostatic hyperplasia)    Leukocytosis    UTI (urinary tract infection)    Dehydration    MARTITA (acute kidney injury)    Hyperglycemia    Altered mental state    -Creatinine downtrending which is reassuring    Plan   -Continue indwelling Mariscal catheter  - Recommend renal ultrasound tomorrow to ensure improvement in hydronephrosis  - Will plan to follow-up on Friday    Walter Jimenez Jr., MD  01/24/24  08:26 EST        Electronically signed by Walter Jimenez Jr., MD at 01/24/24 0838          Consult Notes (last 72 hours)        Shaylee Rodriguez APRN at 01/23/24 1443        Consult Orders    1. Inpatient Urology  Consult [[id number]] ordered by Shola Haque MD at 24 [id number]              Attestation signed by Walter Jimenez Jr., MD at 24 2753    Patient seen and examined for urinary retention and hydronephrosis.  I have reviewed this documentation and agree.    Forde intact, urine light pink    Plan:  - Continue indwelling Forde catheter  - Recommend renal ultrasound tomorrow to ensure resolution of hydronephrosis                       FIRST UROLOGY CONSULT      Patient Identification:  NAME:  Anthony Gallegos  Age:  78 y.o.   Sex:  male   :  1945   MRN:  [id number]     Chief complaint: AMS and dark urine    History of present illness:      Anthony Gallegos is a 78 y.o. male with a history of retention who presented to the ER on 24 for AMS and dark urine. Patient's family reports he has c/o dysuria and incontinence . Pt diagnosed and admitted to the hospital with for AMS, UTI and MARTITA r/t dehydration. A CT scan was performed and patient was noted to have a distended bladder. A forde catheter order was placed. Urology was consulted for evaluation and treatment of urinary retention.  Pt has a  history of retention and was previously seen in the hospital on 23 for urinary retention. Patient his a poor historian. Patient denies prior issues with urination although he required a forde catheter back in December.       In hospital:  -AVSS, good UOP  -WBC - 21.70  -Creat - 2.15  -UA - Large leukocytes, positive nitrites  -UCx - >100,000 CFU/mL Gram Negative Bacilli Abnormal      -CT - Independently reviewed- Urinary bladder wall distention and thickening, anterior bladder diverticulum, mild hydronephrosis    Asked to see    Past medical history:  Past Medical History:   Diagnosis Date    Abscess of scrotum     Arthritis     AS (ankylosing spondylitis)     Hypertension     Tetrahydrocannabinol (THC) use disorder, mild, abuse 2023    Uveitis        Past surgical history:  Past Surgical  History:   Procedure Laterality Date    COLONOSCOPY      ROTATOR CUFF REPAIR Right     TOTAL HIP ARTHROPLASTY Left 2014       Allergies:  Patient has no known allergies.    Home medications:  Medications Prior to Admission   Medication Sig Dispense Refill Last Dose    atenolol (TENORMIN) 50 MG tablet Take 1 tablet by mouth Daily. 30 tablet 0     esomeprazole (nexIUM) 20 MG capsule Take 1 capsule by mouth Daily As Needed (asneeded).       Lidocaine 4 % Place 2 patches on the skin as directed by provider Daily. Remove & Discard patch within 12 hours or as directed by MD 30 each 1     methotrexate 2.5 MG tablet Take 1 tablet by mouth 2 (Two) Times a Week. Pt takes every Wednesday and thursday  5     muscle rub (BenGay) 10-15 % cream cream Apply  topically to the appropriate area as directed 4 (Four) Times a Day As Needed (sore muscles). 85 g 0     Naproxen Sodium 220 MG capsule Take 220 mg of amoxicillin by mouth Daily.       Omega-3 Fatty Acids (OMEGA 3 PO) Take 1 capsule by mouth Daily.       pantoprazole (PROTONIX) 40 MG EC tablet Take 1 tablet by mouth Every Morning.       tamsulosin (FLOMAX) 0.4 MG capsule 24 hr capsule Take 1 capsule by mouth Every Night. 30 capsule 1         Hospital medications:  atenolol, 50 mg, Oral, Q24H  cefTRIAXone, 2,000 mg, Intravenous, Q24H  [START ON 1/24/2024] enoxaparin, 30 mg, Subcutaneous, Q24H  pantoprazole, 40 mg, Oral, Q AM  senna-docusate sodium, 2 tablet, Oral, BID  tamsulosin, 0.4 mg, Oral, Nightly      sodium chloride, 100 mL/hr, Last Rate: 100 mL/hr (01/23/24 0542)        acetaminophen    senna-docusate sodium **AND** polyethylene glycol **AND** bisacodyl **AND** bisacodyl    Calcium Replacement - Follow Nurse / BPA Driven Protocol    Magnesium Standard Dose Replacement - Follow Nurse / BPA Driven Protocol    melatonin    ondansetron ODT **OR** ondansetron    Phosphorus Replacement - Follow Nurse / BPA Driven Protocol    Potassium Replacement - Follow Nurse / BPA Driven  Protocol    Insert Peripheral IV **AND** sodium chloride    Family history:  Family History   Problem Relation Age of Onset    Alzheimer's disease Mother        Social history:  Social History     Tobacco Use    Smoking status: Never    Smokeless tobacco: Never   Vaping Use    Vaping Use: Never used   Substance Use Topics    Alcohol use: No    Drug use: No       Review of systems:      12 point negative except as in HPI    Objective:  TMax 24 hours:   Temp (24hrs), Av.6 °F (37.6 °C), Min:98.8 °F (37.1 °C), Max:100.6 °F (38.1 °C)      Vitals Ranges:   Temp:  [98.8 °F (37.1 °C)-100.6 °F (38.1 °C)] 98.8 °F (37.1 °C)  Heart Rate:  [75-80] 77  Resp:  [18] 18  BP: ()/(57-74) 99/60    Intake/Output Last 3 shifts:  I/O last 3 completed shifts:  In: 682 [P.O.:682]  Out: 900 [Urine:900]     Physical Exam:    General Appearance:    Cooperative, NAD, ill-appearing   Back:     No CVA tenderness   Lungs:     Respirations unlabored, no wheezing   Abdomen:     Soft, NDNT, no masses, no guarding   :    Bladder distended, non-tender   Neuro/Psych:   Confused, mood/affect pleasant       Results review:   I reviewed the patient's new clinical results.    Data review:  Lab Results (last 24 hours)       Procedure Component Value Units Date/Time    Urine Culture - Urine, Urine, Clean Catch [[id number]]  (Abnormal) Collected: 24 2140    Specimen: Urine, Clean Catch Updated: 24 0953     Urine Culture >100,000 CFU/mL Gram Negative Bacilli    Narrative:      Colonization of the urinary tract without infection is common. Treatment is discouraged unless the patient is symptomatic, pregnant, or undergoing an invasive urologic procedure.    Uric Acid [[id number]]  (Normal) Collected: 24 0418    Specimen: Blood Updated: 24 0911     Uric Acid 5.9 mg/dL     Manual Differential [[id number]]  (Abnormal) Collected: 24 0458    Specimen: Blood Updated: 24 0551     Neutrophil % 94.0 %      Lymphocyte % 3.0 %       Monocyte % 3.0 %      Neutrophils Absolute 20.40 10*3/mm3      Lymphocytes Absolute 0.65 10*3/mm3      Monocytes Absolute 0.65 10*3/mm3      Anisocytosis Slight/1+     Dacrocytes Slight/1+     Elliptocytes Mod/2+     Macrocytes Slight/1+     Ovalocytes Mod/2+     Poikilocytes Large/3+     Polychromasia Large/3+     WBC Morphology Normal     Platelet Morphology Normal    Magnesium [[id number]]  (Abnormal) Collected: 01/23/24 0418    Specimen: Blood Updated: 01/23/24 0525     Magnesium 2.5 mg/dL     Basic Metabolic Panel [[id number]]  (Abnormal) Collected: 01/23/24 0418    Specimen: Blood Updated: 01/23/24 0525     Glucose 96 mg/dL      BUN 45 mg/dL      Creatinine 2.15 mg/dL      Sodium 139 mmol/L      Potassium 4.7 mmol/L      Chloride 105 mmol/L      CO2 20.6 mmol/L      Calcium 8.0 mg/dL      BUN/Creatinine Ratio 20.9     Anion Gap 13.4 mmol/L      eGFR 30.6 mL/min/1.73     Narrative:      GFR Normal >60  Chronic Kidney Disease <60  Kidney Failure <15    The GFR formula is only valid for adults with stable renal function between ages 18 and 70.    Hemoglobin A1c [[id number]]  (Abnormal) Collected: 01/23/24 0418    Specimen: Blood Updated: 01/23/24 0516     Hemoglobin A1C 5.80 %     Narrative:      Hemoglobin A1C Ranges:    Increased Risk for Diabetes  5.7% to 6.4%  Diabetes                     >= 6.5%  Diabetic Goal                < 7.0%    CBC & Differential [[id number]]  (Abnormal) Collected: 01/23/24 0458    Specimen: Blood Updated: 01/23/24 0514    Narrative:      The following orders were created for panel order CBC & Differential.  Procedure                               Abnormality         Status                     ---------                               -----------         ------                     CBC Auto Differential[[id number]]        Abnormal            Final result                 Please view results for these tests on the individual orders.    CBC Auto Differential [[id number]]  (Abnormal)  Collected: 01/23/24 0458    Specimen: Blood Updated: 01/23/24 0514     WBC 21.70 10*3/mm3      RBC 3.79 10*6/mm3      Hemoglobin 11.8 g/dL      Hematocrit 34.6 %      MCV 91.3 fL      MCH 31.1 pg      MCHC 34.1 g/dL      RDW 13.0 %      RDW-SD 42.6 fl      MPV 8.8 fL      Platelets 262 10*3/mm3      nRBC 0.0 /100 WBC     Blood Culture - Blood, Hand, Right [[id number]]  (Normal) Collected: 01/22/24 0437    Specimen: Blood from Hand, Right Updated: 01/23/24 0501     Blood Culture No growth at 24 hours    Narrative:      Less than seven (7) mL's of blood was collected.  Insufficient quantity may yield false negative results.    Blood Culture - Blood, Hand, Right [[id number]]  (Normal) Collected: 01/21/24 2138    Specimen: Blood from Hand, Right Updated: 01/22/24 2201     Blood Culture No growth at 24 hours             Imaging:  Imaging Results (Last 24 Hours)       Procedure Component Value Units Date/Time    CT Abdomen Pelvis Without Contrast [[id number]] Collected: 01/23/24 0959     Updated: 01/23/24 1005    Narrative:      CT ABDOMEN AND PELVIS WITHOUT IV CONTRAST     HISTORY: Pyelonephritis versus perinephric abscess.     TECHNIQUE:  CT includes axial imaging from the lung bases to the  trochanters without intravenous contrast and without use of oral  contrast. Data reconstructed in coronal and sagittal planes. Radiation  dose reduction techniques were utilized, including automated exposure  control and exposure modulation based on body size.     COMPARISON: CT abdomen and pelvis 12/20/2023.     FINDINGS: There is mild linear subsegmental basal atelectasis. Liver,  spleen, pancreas appear grossly normal allowing for limitation without  IV contrast and due to streak artifact from patient being scanned with  his arms to his side. There is mild bilateral adrenal thickening,  greater on the left without change.     A right lower pole renal exophytic low-density lesion is most likely a  cyst and measures 5.6 cm and this  was also present on the previous exam  12/20/2023. There is very mild hydronephrosis that is greater on the  right and new when compared to the prior exam. Mild hydroureter is  present to the level of the urinary bladder. There is general bladder  distention with generalized urinary bladder wall thickening consistent  with cystitis. [place] anterior bladder dome diverticulum is present  just posterior to the level of umbilicus. Pericystic stranding is  present.     There is no bowel dilatation or evidence for bowel obstruction. Small  periumbilical hernia contains fat.     There is syndesmophyte formation within the lower thoracic spine and  lumbar spine with bony fusion of the vertebral bodies. There is also  bony fusion of the spinous processes, facet joints and there is  ankylosis of the sacroiliac joints.     Left total hip arthroplasty is present associated with beam-hardening  artifact. There is mild generalized body wall edema.        Impression:      1. Generalized urinary bladder wall distention with wall thickening  consistent with cystitis and urinary retention. Anterior bladder dome  diverticulum. Mild hydronephrosis, greater on the right, potential  pyelonephritis.  2. 5.6 cm right lower pole exophytic low-density lesion is consistent  with a cyst and is without change.  3. Mild generalized body wall edema.  4. Small periumbilical hernia contains fat.  5. Osseous findings suggest ankylosing spondylitis.     Radiation dose reduction techniques were utilized, including automated  exposure control and exposure modulation based on body size.        This report was finalized on 1/23/2024 10:02 AM by Dr. Bobby Eason M.D on Workstation: [id number]                Assessment:     Urinary retention  UTI  BPH  MARTITA    Plan:   - No acute urologic surgical intervention recommended.  - Continue Tamsulosin  - Recommend indwelling forde catheter  - Will continue to monitor Cr  - Continue antibiotics  - RASHEED in  2-3 to reassess hydronephrosis  - Urology will continue to follow           Electronically signed by Walter Jimenez Jr., MD at 01/24/24 9205

## 2024-01-26 NOTE — THERAPY TREATMENT NOTE
Patient Name: Anthony Gallegos  : 1945    MRN: [id number]                              Today's Date: 2024       Admit Date: 2024    Visit Dx:     ICD-10-CM ICD-9-CM   1. Altered mental status, unspecified altered mental status type  R41.82 780.97   2. Leukocytosis, unspecified type  D72.829 288.60     Patient Active Problem List   Diagnosis    Ankylosing spondylitis of cervical region    Recent unexplained weight loss    Abnormal serum lipase level    Abnormal serum level of amylase    Hypertension    Boil    Immobility    Fall from bed    Tetrahydrocannabinol (THC) use disorder, mild, abuse    Generalized pain        Grief    DISH (disseminated idiopathic skeletal hyperostosis)    Generalized weakness    Severe malnutrition    Altered mental status    Transient alteration of awareness    GERD without esophagitis    BPH (benign prostatic hyperplasia)    Leukocytosis    UTI (urinary tract infection)    Dehydration    MARTITA (acute kidney injury)    Hyperglycemia    Altered mental state     Past Medical History:   Diagnosis Date    Abscess of scrotum     Arthritis     AS (ankylosing spondylitis)     Hypertension     Tetrahydrocannabinol (THC) use disorder, mild, abuse 2023    Uveitis      Past Surgical History:   Procedure Laterality Date    COLONOSCOPY      ROTATOR CUFF REPAIR Right     TOTAL HIP ARTHROPLASTY Left       General Information       Row Name 24 1255          OT Time and Intention    Document Type therapy note (daily note)  -     Mode of Treatment occupational therapy;physical therapy;co-treatment  -       Row Name 24 1255          General Information    Patient Profile Reviewed yes  -     Existing Precautions/Restrictions fall  -       Row Name 24 1255          Cognition    Orientation Status (Cognition) oriented x 3  -       Row Name 24 1255          Safety Issues, Functional Mobility    Safety Issues Affecting Function (Mobility) safety  precaution awareness;safety precautions follow-through/compliance  -     Impairments Affecting Function (Mobility) balance;coordination;endurance/activity tolerance;strength;postural/trunk control  -     Comment, Safety Issues/Impairments (Mobility) PT/OT cotreatment medically appropriate and necessary due to patient acuity level, to maximize therapeutic benefit due to impaired act tolerance, and for safety of patient and staff. Treatment focused on progression of care and goals established in POC.  -               User Key  (r) = Recorded By, (t) = Taken By, (c) = Cosigned By      Initials Name Provider Type     Nessa Orellana OT Occupational Therapist                     Mobility/ADL's       Row Name 01/26/24 1256          Bed Mobility    Bed Mobility supine-sit  -     Rolling Right [place] (Bed Mobility) maximum assist (25% patient effort);verbal cues  -     Supine-Sit [place] (Bed Mobility) maximum assist (25% patient effort);2 person assist;verbal cues  -     Assistive Device (Bed Mobility) head of bed elevated;bed rails  -       Row Name 01/26/24 1256          Transfers    Transfers sit-stand transfer;bed-chair transfer  -       Row Name 01/26/24 1256          Bed-Chair Transfer    Bed-Chair [place] (Transfers) moderate assist (50% patient effort);2 person assist;verbal cues  -     Assistive Device (Bed-Chair Transfers) walker, front-wheeled  -       Row Name 01/26/24 1256          Sit-Stand Transfer    Sit-Stand [place] (Transfers) moderate assist (50% patient effort);2 person assist;verbal cues  -     Assistive Device (Sit-Stand Transfers) walker, front-wheeled  -               User Key  (r) = Recorded By, (t) = Taken By, (c) = Cosigned By      Initials Name Provider Type     Nessa Orellana OT Occupational Therapist                   Obj/Interventions       Row Name 01/26/24 1256          Sensory Assessment (Somatosensory)    Sensory Assessment (Somatosensory)  sensation intact  -       Row Name 01/26/24 1256          Vision Assessment/Intervention    Visual Impairment/Limitations WFL  -[facility] Name 01/26/24 1256          Motor Skills    Motor Skills functional endurance  -     Functional Endurance Improving  -[facility] Name 01/26/24 1256          Balance    Balance Assessment sitting static balance;sitting dynamic balance;sit to stand dynamic balance;standing static balance;standing dynamic balance  -     Static Sitting Balance standby assist  -     Dynamic Sitting Balance standby assist;verbal cues;minimal assist  -     Sit to Stand Dynamic Balance moderate assist;maximum assist;2-person assist  -     Static Standing Balance minimal assist;2-person assist;verbal cues;non-verbal cues (demo/gesture)  -     Dynamic Standing Balance minimal assist;2-person assist;moderate assist;verbal cues;non-verbal cues (demo/gesture)  -     Position/Device Used, Standing Balance walker, front-wheeled  -     Balance Interventions sitting;standing;sit to stand;supported;occupation based/functional task  -               User Key  (r) = Recorded By, (t) = Taken By, (c) = Cosigned By      Initials Name Provider Type     Nessa Orellana, STUART Occupational Therapist                   Goals/Plan    No documentation.                  Clinical Impression       [facility] Name 01/26/24 1257          Pain Assessment    Pretreatment Pain Rating 0/10 - no pain  -     Posttreatment Pain Rating 0/10 - no pain  -[facility] Name 01/26/24 1257          Plan of Care Review    Plan of Care Reviewed With patient  -     Progress improving  -     Outcome Evaluation Patient tolerated treatment well, and seen for OT/PT cotreatment. Patient performed bed mobility, continuing to require max Ax2, with maximal verbal cues for initiation and sequencing. Patient able to sit EOB unsupported with cues for postural alignment, and bed mechanics. Patient performed sit to stand and bed to chair  transfer with use of rwx with slow gait, requiring max verbal cues for each step, and significantly increased time for participation in transfers. Patient will continue to benefit from skilled OT to address current functional deficits, anticipate need for SNF.  -       Row Name 01/26/24 1257          Therapy Assessment/Plan (OT)    Rehab Potential (OT) good, to achieve stated therapy goals  -     Criteria for Skilled Therapeutic Interventions Met (OT) yes;meets criteria;skilled treatment is necessary  -     Therapy Frequency (OT) 5 times/wk  -       Row Name 01/26/24 1257          Therapy Plan Review/Discharge Plan (OT)    Anticipated Discharge Disposition (OT) skilled nursing facility  -       Row Name 01/26/24 1257          Positioning and Restraints    Pre-Treatment Position in bed  -     Post Treatment Position chair  -     In Chair notified nsg;sitting;call light within reach;encouraged to call for assist  -               User Key  (r) = Recorded By, (t) = Taken By, (c) = Cosigned By      Initials Name Provider Type     Nessa Orellana, STUART Occupational Therapist                   Outcome Measures       Row Name 01/26/24 1503          How much help from another is currently needed...    Putting on and taking off regular lower body clothing? 1  -LH     Bathing (including washing, rinsing, and drying) 1  -LH     Toileting (which includes using toilet bed pan or urinal) 1  -LH     Putting on and taking off regular upper body clothing 2  -LH     Taking care of personal grooming (such as brushing teeth) 3  -LH     Eating meals 3  -LH     AM-PAC 6 Clicks Score (OT) 11  -       Row Name 01/26/24 1206 01/26/24 0400       How much help from another person do you currently need...    Turning from your back to your side while in flat bed without using bedrails? 2  -DJ 2  -BB    Moving from lying on back to sitting on the side of a flat bed without bedrails? 2  -DJ 2  -BB    Moving to and from a bed to a  chair (including a wheelchair)? 2  -DJ 2  -BB    Standing up from a chair using your arms (e.g., wheelchair, bedside chair)? 2  -DJ 2  -BB    Climbing 3-5 steps with a railing? 1  -DJ 1  -BB    To walk in hospital room? 2  -DJ 2  -BB    AM-PAC 6 Clicks Score (PT) 11  -DJ 11  -BB    Highest Level of Mobility Goal 4 --> Transfer to chair/commode  -DJ 4 --> Transfer to chair/commode  -BB      Row Name 01/26/24 1503          Modified [place] Scale    Modified [place] Scale 4 - Moderately severe disability.  Unable to walk without assistance, and unable to attend to own bodily needs without assistance.  -       Row Name 01/26/24 1503 01/26/24 1206       Functional Assessment    Outcome Measure Options AM-PAC 6 Clicks Daily Activity (OT);Modified [place]  - AM-PAC 6 Clicks Basic Mobility (PT)  -DJ              User Key  (r) = Recorded By, (t) = Taken By, (c) = Cosigned By      Initials Name Provider Type    Roxi Stark, PT Physical Therapist     Nessa Orellana, OT Occupational Therapist     Talisha Patel, RN Registered Nurse                    Occupational Therapy Education       Title: PT OT SLP Therapies (In Progress)       Topic: Occupational Therapy (Done)       Point: ADL training (Done)       Description:   Instruct learner(s) on proper safety adaptation and remediation techniques during self care or transfers.   Instruct in proper use of assistive devices.                  Learning Progress Summary             Patient Acceptance, E, VU by  at 1/26/2024 0147    Acceptance, E,TB, VU by  at 1/24/2024 1412    Comment: Patient is instructed in role of OT, discharge planning, home safety, energy conservation/work simplification strategies                         Point: Home exercise program (Done)       Description:   Instruct learner(s) on appropriate technique for monitoring, assisting and/or progressing therapeutic exercises/activities.                  Learning Progress Summary             Patient  Acceptance, E, VU by  at 1/26/2024 0147    Acceptance, E,TB, VU by  at 1/24/2024 1412    Comment: Patient is instructed in role of OT, discharge planning, home safety, energy conservation/work simplification strategies                         Point: Precautions (Done)       Description:   Instruct learner(s) on prescribed precautions during self-care and functional transfers.                  Learning Progress Summary             Patient Acceptance, E, VU by  at 1/26/2024 0147    Acceptance, E,TB, VU by  at 1/24/2024 1412    Comment: Patient is instructed in role of OT, discharge planning, home safety, energy conservation/work simplification strategies                         Point: Body mechanics (Done)       Description:   Instruct learner(s) on proper positioning and spine alignment during self-care, functional mobility activities and/or exercises.                  Learning Progress Summary             Patient Acceptance, E, VU by  at 1/26/2024 0147    Acceptance, E,TB, VU by  at 1/24/2024 1412    Comment: Patient is instructed in role of OT, discharge planning, home safety, energy conservation/work simplification strategies                                         User Key       Initials Effective Dates Name Provider Type Discipline     08/31/23 -  Nessa Orellana OT Occupational Therapist OT     11/16/23 -  Talisha Patel RN Registered Nurse Nurse                  OT Recommendation and Plan  Planned Therapy Interventions (OT): activity tolerance training, BADL retraining, functional balance retraining, occupation/activity based interventions, patient/caregiver education/training, strengthening exercise, transfer/mobility retraining  Therapy Frequency (OT): 5 times/wk  Plan of Care Review  Plan of Care Reviewed With: patient  Progress: improving  Outcome Evaluation: Patient tolerated treatment well, and seen for OT/PT cotreatment. Patient performed bed mobility, continuing to require max Ax2,  with maximal verbal cues for initiation and sequencing. Patient able to sit EOB unsupported with cues for postural alignment, and bed mechanics. Patient performed sit to stand and bed to chair transfer with use of rwx with slow gait, requiring max verbal cues for each step, and significantly increased time for participation in transfers. Patient will continue to benefit from skilled OT to address current functional deficits, anticipate need for SNF.     Time Calculation:   Evaluation Complexity (OT)  Review Occupational Profile/Medical/Therapy History Complexity: expanded/moderate complexity  Assessment, Occupational Performance/Identification of Deficit Complexity: 3-5 performance deficits  Clinical Decision Making Complexity (OT): detailed assessment/moderate complexity  Overall Complexity of Evaluation (OT): moderate complexity     Time Calculation- OT       Row Name 01/26/24 1503             Time Calculation- OT    OT Start Time 0846  -      OT Stop Time 0912  -      OT Time Calculation (min) 26 min  -      Total Timed Code Minutes- OT 26 minute(s)  -      OT Received On 01/26/24  -      OT - Next Appointment 01/29/24  -         Timed Charges    13993 - OT Therapeutic Activity Minutes 26  -LH         Total Minutes    Timed Charges Total Minutes 26  -       Total Minutes 26  -LH                User Key  (r) = Recorded By, (t) = Taken By, (c) = Cosigned By      Initials Name Provider Type     Nessa Orellana OT Occupational Therapist                  Therapy Charges for Today       Code Description Service Date Service Provider Modifiers Qty    [id number]  OT THERAPEUTIC ACT EA 15 MIN 1/26/2024 Nessa Orellana OT GO 2                 Nessa Orellana OT  1/26/2024

## 2024-01-26 NOTE — THERAPY EVALUATION
Acute Care - Speech Language Pathology   Swallow Initial Evaluation [facility]     Patient Name: Anthony Gallegos  : 1945  MRN: [id number]  Today's Date: 2024               Admit Date: 2024    Visit Dx:     ICD-10-CM ICD-9-CM   1. Altered mental status, unspecified altered mental status type  R41.82 780.97   2. Leukocytosis, unspecified type  D72.829 288.60     Patient Active Problem List   Diagnosis    Ankylosing spondylitis of cervical region    Recent unexplained weight loss    Abnormal serum lipase level    Abnormal serum level of amylase    Hypertension    Boil    Immobility    Fall from bed    Tetrahydrocannabinol (THC) use disorder, mild, abuse    Generalized pain        Grief    DISH (disseminated idiopathic skeletal hyperostosis)    Generalized weakness    Severe malnutrition    Altered mental status    Transient alteration of awareness    GERD without esophagitis    BPH (benign prostatic hyperplasia)    Leukocytosis    UTI (urinary tract infection)    Dehydration    MARTITA (acute kidney injury)    Hyperglycemia    Altered mental state     Past Medical History:   Diagnosis Date    Abscess of scrotum     Arthritis     AS (ankylosing spondylitis)     Hypertension     Tetrahydrocannabinol (THC) use disorder, mild, abuse 2023    Uveitis      Past Surgical History:   Procedure Laterality Date    COLONOSCOPY      ROTATOR CUFF REPAIR Right     TOTAL HIP ARTHROPLASTY Left 2014       SLP Recommendation and Plan  SLP Swallowing Diagnosis: oral dysphagia, suspected pharyngeal dysphagia (24 1400)  SLP Diet Recommendation: soft to chew textures, ground, thin liquids (24 1400)  Recommended Precautions and Strategies: upright posture during/after eating, small bites of food and sips of liquid, no straw (24 1400)  SLP Rec. for Method of Medication Administration: meds whole, with puree (24 1400)     Monitor for Signs of Aspiration: yes, notify SLP if any concerns  (01/26/24 1400)     Swallow Criteria for Skilled Therapeutic Interventions Met: demonstrates skilled criteria (01/26/24 1400)  Anticipated Discharge Disposition (SLP): skilled nursing facility, anticipate therapy at next level of care (01/26/24 1400)  Rehab Potential/Prognosis, Swallowing: good, to achieve stated therapy goals (01/26/24 1400)  Therapy Frequency (Swallow): PRN (01/26/24 1400)  Predicted Duration Therapy Intervention (Days): until discharge (01/26/24 1400)  Oral Care Recommendations: Oral Care BID/PRN (01/26/24 1400)                                        Plan of Care Reviewed With: patient  Outcome Evaluation: Swallow eval completed. Pt had consistent couging noted with thin via straw. Pt tolerated cup sips of thin better with no coughing. Pt had no s/s with NTL (straw), pureed, soft, or mixed. Pt had slow mastication with soft solids. Oral cavity clear post swallow. Recommend soft, ground w/ thin; meds whole in pureed; upright for meals and 30 min after; slow rate; small bites/sips; NO STRAWS. ST to follow.      SWALLOW EVALUATION (last 72 hours)       SLP Adult Swallow Evaluation       Row Name 01/26/24 1400                   Rehab Evaluation    Document Type evaluation  -AW        Subjective Information no complaints  -AW        Patient Observations alert;cooperative;agree to therapy  -AW        Patient Effort good  -AW        Symptoms Noted During/After Treatment fatigue  -AW           General Information    Patient Profile Reviewed yes  -AW        Pertinent History Of Current Problem Pt admitted with AMS and UTI; h/o DISH, hypertension, ankylosing spondylitis  -AW        Current Method of Nutrition regular textures;thin liquids  -AW        Precautions/Limitations, Vision WFL;for purposes of eval  -AW        Precautions/Limitations, Hearing WFL;difficult to assess  -AW        Prior Level of Function-Communication unknown  -AW        Prior Level of Function-Swallowing no diet consistency  restrictions  -AW        Plans/Goals Discussed with patient;agreed upon  -AW        Barriers to Rehab none identified  -AW        Patient's Goals for Discharge return home  -AW           Pain    Additional Documentation Pain Scale: Numbers Pre/Post-Treatment (Group)  -AW           Pain Scale: Numbers Pre/Post-Treatment    Pretreatment Pain Rating 10/10  -AW        Posttreatment Pain Rating 10/10  -AW        Pain Location - Side/Orientation Right  -AW        Pain Location - knee  -AW           Oral Motor Structure and Function    Dentition Assessment has dentures but does not use;other (see comments)  has upper plate, lower plate lost; does not use upper.  -AW        Secretion Management WNL/WFL  -AW        Mucosal Quality moist, healthy  -AW           Oral Musculature and Cranial Nerve Assessment    Oral Motor General Assessment generalized oral motor weakness  -AW        Vocal Impairment, Detail. Cranial Nerve X (Vagus) vocal quality abnormality (see comments);other (see comments)  hoarse  -AW           General Eating/Swallowing Observations    Respiratory Support Currently in Use room air  -AW        Eating/Swallowing Skills self-fed;fed by SLP  -AW        Positioning During Eating upright in bed  -AW        Utensils Used spoon;cup;straw  -AW        Consistencies Trialed thin liquids;nectar/syrup-thick liquids;pureed;soft to chew textures;mixed consistency  -AW           Clinical Swallow Eval    Clinical Swallow Evaluation Summary Swallow eval completed. Pt had consistent couging noted with thin via straw. Pt tolerated cup sips of thin better with no coughing. Pt had no s/s with NTL (straw), pureed, soft, or mixed. Pt had slow mastication with soft solids. Oral cavity clear post swallow. Recommend soft, ground w/ thin; meds whole in pureed; upright for meals and 30 min after; slow rate; small bites/sips; NO STRAWS. ST to follow.  -AW           SLP Evaluation Clinical Impression    SLP Swallowing Diagnosis oral  dysphagia;suspected pharyngeal dysphagia  -AW        Functional Impact risk of aspiration/pneumonia  -AW        Rehab Potential/Prognosis, Swallowing good, to achieve stated therapy goals  -AW        Swallow Criteria for Skilled Therapeutic Interventions Met demonstrates skilled criteria  -AW           Recommendations    Therapy Frequency (Swallow) PRN  -AW        Predicted Duration Therapy Intervention (Days) until discharge  -AW        SLP Diet Recommendation soft to chew textures;ground;thin liquids  -AW        Recommended Precautions and Strategies upright posture during/after eating;small bites of food and sips of liquid;no straw  -AW        Oral Care Recommendations Oral Care BID/PRN  -AW        SLP Rec. for Method of Medication Administration meds whole;with puree  -AW        Monitor for Signs of Aspiration yes;notify SLP if any concerns  -AW        Anticipated Discharge Disposition (SLP) skilled nursing facility;anticipate therapy at next level of care  -AW           (STG) Patient will tolerate trials of    Consistencies Trialed (Tolerate trials) soft to chew (ground) textures;thin liquids  -AW        Desired Outcome (Tolerate trials) without signs/symptoms of aspiration  -AW        [place] (Tolerate trials) independently (over 90% accuracy)  -AW        Time Frame (Tolerate trials) by discharge  -AW                  User Key  (r) = Recorded By, (t) = Taken By, (c) = Cosigned By      Initials Name Effective Dates    Ashtyn Doty, SLP 08/28/23 -                     EDUCATION  The patient has been educated in the following areas:   Dysphagia (Swallowing Impairment) Oral Care/Hydration Modified Diet Instruction.        SLP GOALS       Row Name 01/26/24 1400             (STG) Patient will tolerate trials of    Consistencies Trialed (Tolerate trials) soft to chew (ground) textures;thin liquids  -AW      Desired Outcome (Tolerate trials) without signs/symptoms of aspiration  -AW      [place] (Tolerate  trials) independently (over 90% accuracy)  -AW      Time Frame (Tolerate trials) by discharge  -AW                User Key  (r) = Recorded By, (t) = Taken By, (c) = Cosigned By      Initials Name Provider Type    Ashtyn Doty SLP Speech and Language Pathologist                       Time Calculation:    Time Calculation- SLP       Row Name 01/26/24 1446             Time Calculation- SLP    SLP Start Time 1345  -AW      SLP Received On 01/26/24  -AW                User Key  (r) = Recorded By, (t) = Taken By, (c) = Cosigned By      Initials Name Provider Type    Ashtyn Doty SLP Speech and Language Pathologist                    Therapy Charges for Today       Code Description Service Date Service Provider Modifiers Qty    [id number] HC ST EVAL ORAL PHARYNG SWALLOW 4 1/26/2024 Ashtyn Johnson SLP GN 1                 BRANDON Navarrete  1/26/2024

## 2024-01-26 NOTE — CASE MANAGEMENT/SOCIAL WORK
"Continued Stay Note  [facility]     Patient Name: Anthony Gallegos  MRN: [id number]  Today's Date: 1/26/2024    Admit Date: 1/21/2024    Plan: Referral to [facility] pending will need [employer]   Discharge Plan       Row Name 01/26/24 1436       Plan    Plan Referral to [facility] pending will need [employer]    Patient/Family in Agreement with Plan yes    Plan Comments Recieved inbound call from pts dtr Whit [phone] to discuss dc planning. CCP explained had tried to reach her 5-6 times but number wouldnt dial. She states sometimes calling the 502 \"messes up\", she provided her 's # in case unable to reach her. Discussed dc planning and discussed anticipated rehab at [place]. Offered to make referral to [facility] and dtr prefers [facility] due to location to her home. CCP explained would make referral and follow up with her regarding outcome. Pt will need pre-cert. Dtr asks if MD ok to write letter stating pt unable to live alone anylonger to assist with pt terminating his lease. CCP explained would reach out to MD, also discussed speaking with pts PCP. Referral to [facility], pt will need pre-cert. Partial Packet in ccp office. Santana MOORE/MOIZ                   Discharge Codes    No documentation.                 Expected Discharge Date and Time       Expected Discharge Date Expected Discharge Time    Jan 29, 2024               Martha Justice RN    "

## 2024-01-27 LAB
ALBUMIN SERPL-MCNC: 2 G/DL (ref 3.5–5.2)
ANION GAP SERPL CALCULATED.3IONS-SCNC: 7.5 MMOL/L (ref 5–15)
BACTERIA SPEC AEROBE CULT: NORMAL
BASOPHILS # BLD AUTO: 0.04 10*3/MM3 (ref 0–0.2)
BASOPHILS NFR BLD AUTO: 0.3 % (ref 0–1.5)
BUN SERPL-MCNC: 22 MG/DL (ref 8–23)
BUN/CREAT SERPL: 22.4 (ref 7–25)
CALCIUM SPEC-SCNC: 7.3 MG/DL (ref 8.6–10.5)
CHLORIDE SERPL-SCNC: 104 MMOL/L (ref 98–107)
CO2 SERPL-SCNC: 22.5 MMOL/L (ref 22–29)
CREAT SERPL-MCNC: 0.98 MG/DL (ref 0.76–1.27)
DEPRECATED RDW RBC AUTO: 41.2 FL (ref 37–54)
EGFRCR SERPLBLD CKD-EPI 2021: 78.4 ML/MIN/1.73
EOSINOPHIL # BLD AUTO: 0.34 10*3/MM3 (ref 0–0.4)
EOSINOPHIL NFR BLD AUTO: 2.8 % (ref 0.3–6.2)
ERYTHROCYTE [DISTWIDTH] IN BLOOD BY AUTOMATED COUNT: 12.8 % (ref 12.3–15.4)
GLUCOSE SERPL-MCNC: 89 MG/DL (ref 65–99)
HCT VFR BLD AUTO: 28.3 % (ref 37.5–51)
HGB BLD-MCNC: 9.3 G/DL (ref 13–17.7)
IMM GRANULOCYTES # BLD AUTO: 0.21 10*3/MM3 (ref 0–0.05)
IMM GRANULOCYTES NFR BLD AUTO: 1.7 % (ref 0–0.5)
LYMPHOCYTES # BLD AUTO: 1.13 10*3/MM3 (ref 0.7–3.1)
LYMPHOCYTES NFR BLD AUTO: 9.3 % (ref 19.6–45.3)
MAGNESIUM SERPL-MCNC: 2.2 MG/DL (ref 1.6–2.4)
MCH RBC QN AUTO: 29.5 PG (ref 26.6–33)
MCHC RBC AUTO-ENTMCNC: 32.9 G/DL (ref 31.5–35.7)
MCV RBC AUTO: 89.8 FL (ref 79–97)
MONOCYTES # BLD AUTO: 1.31 10*3/MM3 (ref 0.1–0.9)
MONOCYTES NFR BLD AUTO: 10.8 % (ref 5–12)
NEUTROPHILS NFR BLD AUTO: 75.1 % (ref 42.7–76)
NEUTROPHILS NFR BLD AUTO: 9.07 10*3/MM3 (ref 1.7–7)
NRBC BLD AUTO-RTO: 0 /100 WBC (ref 0–0.2)
PHOSPHATE SERPL-MCNC: 1.9 MG/DL (ref 2.5–4.5)
PHOSPHATE SERPL-MCNC: 2.9 MG/DL (ref 2.5–4.5)
PLATELET # BLD AUTO: 301 10*3/MM3 (ref 140–450)
PMV BLD AUTO: 9.7 FL (ref 6–12)
POTASSIUM SERPL-SCNC: 3.5 MMOL/L (ref 3.5–5.2)
POTASSIUM SERPL-SCNC: 4 MMOL/L (ref 3.5–5.2)
RBC # BLD AUTO: 3.15 10*6/MM3 (ref 4.14–5.8)
SODIUM SERPL-SCNC: 134 MMOL/L (ref 136–145)
URATE SERPL-MCNC: 4.5 MG/DL (ref 3.4–7)
WBC NRBC COR # BLD AUTO: 12.1 10*3/MM3 (ref 3.4–10.8)

## 2024-01-27 PROCEDURE — 80069 RENAL FUNCTION PANEL: CPT | Performed by: INTERNAL MEDICINE

## 2024-01-27 PROCEDURE — 84132 ASSAY OF SERUM POTASSIUM: CPT | Performed by: STUDENT IN AN ORGANIZED HEALTH CARE EDUCATION/TRAINING PROGRAM

## 2024-01-27 PROCEDURE — 84100 ASSAY OF PHOSPHORUS: CPT | Performed by: STUDENT IN AN ORGANIZED HEALTH CARE EDUCATION/TRAINING PROGRAM

## 2024-01-27 PROCEDURE — 25810000003 SODIUM CHLORIDE 0.9 % SOLUTION: Performed by: STUDENT IN AN ORGANIZED HEALTH CARE EDUCATION/TRAINING PROGRAM

## 2024-01-27 PROCEDURE — 85025 COMPLETE CBC W/AUTO DIFF WBC: CPT | Performed by: STUDENT IN AN ORGANIZED HEALTH CARE EDUCATION/TRAINING PROGRAM

## 2024-01-27 PROCEDURE — 83735 ASSAY OF MAGNESIUM: CPT | Performed by: STUDENT IN AN ORGANIZED HEALTH CARE EDUCATION/TRAINING PROGRAM

## 2024-01-27 PROCEDURE — 84550 ASSAY OF BLOOD/URIC ACID: CPT | Performed by: INTERNAL MEDICINE

## 2024-01-27 PROCEDURE — 25010000002 CEFTRIAXONE PER 250 MG: Performed by: STUDENT IN AN ORGANIZED HEALTH CARE EDUCATION/TRAINING PROGRAM

## 2024-01-27 PROCEDURE — 97110 THERAPEUTIC EXERCISES: CPT

## 2024-01-27 PROCEDURE — 25010000002 ENOXAPARIN PER 10 MG: Performed by: HOSPITALIST

## 2024-01-27 RX ORDER — POTASSIUM CHLORIDE 750 MG/1
40 TABLET, FILM COATED, EXTENDED RELEASE ORAL EVERY 4 HOURS
Status: COMPLETED | OUTPATIENT
Start: 2024-01-27 | End: 2024-01-27

## 2024-01-27 RX ADMIN — CEFTRIAXONE 2000 MG: 2 INJECTION, POWDER, FOR SOLUTION INTRAMUSCULAR; INTRAVENOUS at 20:27

## 2024-01-27 RX ADMIN — SODIUM PHOSPHATE, MONOBASIC, MONOHYDRATE AND SODIUM PHOSPHATE, DIBASIC, ANHYDROUS 15 MMOL: 142; 276 INJECTION, SOLUTION INTRAVENOUS at 16:46

## 2024-01-27 RX ADMIN — SODIUM PHOSPHATE, MONOBASIC, MONOHYDRATE AND SODIUM PHOSPHATE, DIBASIC, ANHYDROUS 15 MMOL: 142; 276 INJECTION, SOLUTION INTRAVENOUS at 22:30

## 2024-01-27 RX ADMIN — SODIUM PHOSPHATE, MONOBASIC, MONOHYDRATE AND SODIUM PHOSPHATE, DIBASIC, ANHYDROUS 15 MMOL: 142; 276 INJECTION, SOLUTION INTRAVENOUS at 13:21

## 2024-01-27 RX ADMIN — POTASSIUM CHLORIDE 40 MEQ: 750 TABLET, EXTENDED RELEASE ORAL at 09:56

## 2024-01-27 RX ADMIN — POTASSIUM CHLORIDE 40 MEQ: 750 TABLET, EXTENDED RELEASE ORAL at 18:01

## 2024-01-27 RX ADMIN — PANTOPRAZOLE SODIUM 40 MG: 40 TABLET, DELAYED RELEASE ORAL at 05:13

## 2024-01-27 RX ADMIN — ENOXAPARIN SODIUM 30 MG: 100 INJECTION SUBCUTANEOUS at 09:56

## 2024-01-27 RX ADMIN — SENNOSIDES AND DOCUSATE SODIUM 2 TABLET: 50; 8.6 TABLET ORAL at 09:56

## 2024-01-27 RX ADMIN — TAMSULOSIN HYDROCHLORIDE 0.4 MG: 0.4 CAPSULE ORAL at 20:28

## 2024-01-27 NOTE — PROGRESS NOTES
Ded[facility] to Hospital Care    [phone]   LOS: 5 days     Name: Anthony Gallegos  Age/Sex: 78 y.o. male  :  1945        PCP: Provider, No Known  Chief Complaint   Patient presents with    Dysuria    Altered Mental Status      Subjective   No acute events overnight.  Patient doing well this morning.  Sitting up in bed.  Much more awake and conversive today.  Denies any chest pain or shortness of breath.  He is fully oriented.  Tolerating oral intake after SLP evaluation yesterday.    cefTRIAXone, 2,000 mg, Intravenous, Q24H  enoxaparin, 30 mg, Subcutaneous, Q24H  pantoprazole, 40 mg, Oral, Q AM  potassium & sodium phosphates, 2 packet, Oral, Once  potassium chloride ER, 40 mEq, Oral, Q4H  senna-docusate sodium, 2 tablet, Oral, BID  sodium phosphate, 15 mmol, Intravenous, Q3H  tamsulosin, 0.4 mg, Oral, Nightly           Objective   Vital Signs  Temp:  [97.9 °F (36.6 °C)-98.1 °F (36.7 °C)] 98.1 °F (36.7 °C)  Heart Rate:  [70-76] 76  Resp:  [18] 18  BP: (114-133)/(51-67) 122/57  Body mass index is 21.05 kg/m².    Intake/Output Summary (Last 24 hours) at 2024 1213  Last data filed at 2024 0830  Gross per 24 hour   Intake 780 ml   Output 3800 ml   Net -3020 ml     General: Alert, no acute distress.  Sitting up in bed this morning.  Much quicker to respond today.  Fully oriented.  Chronically ill and frail appearing.  ENT: No conjunctival injection or scleral icterus. Moist mucous membranes.   Neuro: Eyes open and moving in all directions, strength normal in all extremities, no focal deficits.   Lungs: Clear to auscultation bilaterally. No wheeze or crackles. No distress.   Heart: RRR, no murmurs. No edema.  Abdomen: Soft, non-tender, non-distended. Normal bowel sounds.   Ext: Warm and well-perfused. No edema.   Skin: No rashes or lesions. IV site without swelling or erythema.     Results Review:       I reviewed the patient's new clinical results.  Results from last 7 days   Lab Units 24  8696  01/26/24  0514 01/25/24  0725 01/24/24  0326 01/23/24  0458 01/22/24  0437 01/21/24  1852   WBC 10*3/mm3 12.10* 15.73* 23.98* 15.86* 21.70* 15.81* 17.86*   HEMOGLOBIN g/dL 9.3* 10.3* 10.9* 10.1* 11.8* 13.1 12.3*   PLATELETS 10*3/mm3 301 283 253 218 262 350 372     Results from last 7 days   Lab Units 01/27/24  0504 01/26/24  0514 01/25/24  0725 01/24/24  0326 01/23/24  0418 01/22/24  0437 01/21/24  1852   SODIUM mmol/L 134* 137 139 138 139 138 139   POTASSIUM mmol/L 3.5 3.6 3.7 4.0 4.7 4.5 4.7   CHLORIDE mmol/L 104 107 109* 107 105 101 100   CO2 mmol/L 22.5 19.7* 20.0* 21.9* 20.6* 23.4 26.0   BUN mg/dL 22 32* 35* 41* 45* 30* 27*   CREATININE mg/dL 0.98 1.50* 1.69* 1.64* 2.15* 1.58* 1.79*   CALCIUM mg/dL 7.3* 7.5* 7.4* 7.1* 8.0* 8.8 9.5   MAGNESIUM mg/dL 2.2 2.3 2.2 2.4 2.5*  --   --    PHOSPHORUS mg/dL 1.9* 1.6* 2.6 2.6  --   --   --    Estimated Creatinine Clearance: 66 mL/min (by C-G formula based on SCr of 0.98 mg/dL).      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       PLAN  Mr. Gallegos is a 78 y.o. male with a history of DISH, hypertension, ankylosing spondylitis and impaired mobility who presented to [facility] initially complaining of confusion and was found to have urinary tract infection and admitted to monitored unit.    -Urinary tract infection: CT scan of the abdomen and pelvis  yesterday showed continued bladder distention and some evidence of possible pyelonephritis with hydronephrosis and stranding.  Urine culture now growing Klebsiella.  Has been afebrile for greater than 24 hours.  Continue ceftriaxone 2 g every 24 hours x 7 days to cover for pyelonephritis.    -Urinary retention: Mariscal catheter was placed and urology was consulted.  Urine continues to be blood-tinged.  If the Mariscal continues to clog, may need to place larger urinary catheter.  Patient going to be discharged with Mariscal in place.  Urology has now signed off.    -Dysphagia: SLP evaluated.  Recommending soft, ground meats with thin liquids.  Meds whole in puréed.  No straws.  Continuing to follow.    -Leukocytosis: WBC decreased back to 12 K today. No obvious new source of infection.  Clinically patient appears to be improving, has been afebrile for over 24 hours.  Blood cultures remain no growth to date.  Given that patient is clinically improving, going to hold off on further workup for now.  Seems to now be responding to antibiotics.    -Acute renal failure, electrolyte derangements: Creatinine normalized to 0.98 today.  Baseline is normal.  Phosphorus low at 1.9 today.  Corrected calcium 9.3.  Avoid nephrotoxic agents including NSAIDs and contrast dyes.  Electrolyte placement protocol.  Monitor with daily BMP.  Nephrology consulted and following.  Appreciate their assistance.    -Anemia: Hgb 9.3 today, baseline appears around 12.  No signs of active bleeding. Given that Hgb has dropped, will check fecal occult.  Monitor with daily CBC and transfuse for Hgb less than 7.    -Lovenox for DVT prophylaxis  -Full code    Patient is clinically doing well.  At this point, he is medically appropriate for discharge.  Precertification process for rehab was started yesterday by case management, and referrals for facilities were sent.  They are continuing to follow.  Patient pending placement at this time.  Spoke with the  patient's daughter (Whit) on the phone and provided an update.    Mireya Basilio MD  [facility]

## 2024-01-27 NOTE — PROGRESS NOTES
[facility]OLOGY DAILY PROGRESS NOTE      Name: Anthony Gallegos  Age: 78 y.o.  Sex: male  :  1945  MRN: [id number]    Date: 2024             Chief complaint: No new complaints    Patient appears comfortable. Nurse reports that catheter has not required irrigation over night. Currently draining well. No presence of clots    - AVSS, good UOP  - WBC - 12.10  - Cr - 0.98    Physical Exam:    General Appearance:    Alert, cooperative, NAD   Back:     No CVA tenderness   Lungs:     Respirations unlabored, no wheezing   Abdomen:  :      Soft, NDNT, no masses, no guarding    Forde catheter in place draining light pink urine; bladder non-distended, non-tender   Neuro/Psych:   Orientation intact, mood/affect pleasant       Assessment:    Urinary retention   -Forde catheter in place, draining well  UTI  BPH  MARTITA   - Improving  Gross hematuria  Hydronephrosis  - Resolved per recent ultrasound    Plan:    - Continue indwelling forde catheter  - Manually irrigate PRN if output decrease or concern for retention  - Will need to be discharged with catheter in place  - Urology will sign-off  - Patient needs to follow-up in our office in 2-3 weeks- our office tried schedule with patient on  but unable to reach, will let them know patient is still admitted  - Call for any questions, concerns, or changes in patient's condition     Shaylee Rodriguez, APRN  2024  09:11 EST

## 2024-01-27 NOTE — PROGRESS NOTES
[facility] Progress Note      Patient Name: Anthony Gallegos  : 1945  MRN: [id number]  Primary Care Physician:  Provider, No Known  Date of admission: 2024    Subjective     Interval History:   Follow-up acute kidney injury.    Feeling about the same, flat in bed, oxygen on.  No nausea or vomiting.  3800 cc urine output recorded last 24 hours.    Review of Systems:   As noted above    Objective     Vitals:   Temp:  [97.9 °F (36.6 °C)-98.1 °F (36.7 °C)] 98.1 °F (36.7 °C)  Heart Rate:  [70-76] 76  Resp:  [18] 18  BP: (114-133)/(51-67) 122/57    Intake/Output Summary (Last 24 hours) at 2024 1227  Last data filed at 2024 0830  Gross per 24 hour   Intake 420 ml   Output 3800 ml   Net -3380 ml       Physical Exam:    General Appearance: alert, awake, chronically ill, no acute distress  Skin: warm and dry  HEENT: Edentulous.    Neck: supple, no JVD  Lungs: Clear to auscultation, unlabored breathing effort.  Heart: RRR, no rub  Abdomen: soft, he had significant tenderness to palpation below his umbilicus, nondistended.  Normoactive bowel  : coude catheter, bloody urine noted in the bag.  Extremities: Trace edema.  Neuro: Moving all extremities    Scheduled Meds:     cefTRIAXone, 2,000 mg, Intravenous, Q24H  enoxaparin, 30 mg, Subcutaneous, Q24H  pantoprazole, 40 mg, Oral, Q AM  potassium & sodium phosphates, 2 packet, Oral, Once  potassium chloride ER, 40 mEq, Oral, Q4H  senna-docusate sodium, 2 tablet, Oral, BID  sodium phosphate, 15 mmol, Intravenous, Q3H  tamsulosin, 0.4 mg, Oral, Nightly      IV Meds:          Results Reviewed:   I have personally reviewed the results from the time of this admission to 2024 12:27 EST     Results from last 7 days   Lab Units 24  0504 24  0514 24  0725 24  0437 24  1852   SODIUM mmol/L 134* 137 139   < > 139   POTASSIUM mmol/L 3.5 3.6 3.7   < > 4.7   CHLORIDE mmol/L 104 107 109*   < > 100   CO2 mmol/L  22.5 19.7* 20.0*   < > 26.0   BUN mg/dL 22 32* 35*   < > 27*   CREATININE mg/dL 0.98 1.50* 1.69*   < > 1.79*   CALCIUM mg/dL 7.3* 7.5* 7.4*   < > 9.5   BILIRUBIN mg/dL  --   --   --   --  0.5   ALK PHOS U/L  --   --   --   --  80   ALT (SGPT) U/L  --   --   --   --  22   AST (SGOT) U/L  --   --   --   --  47*   GLUCOSE mg/dL 89 95 88   < > 123*    < > = values in this interval not displayed.       Estimated Creatinine Clearance: 66 mL/min (by C-G formula based on SCr of 0.98 mg/dL).    Results from last 7 days   Lab Units 01/27/24  0504 01/26/24  0514 01/25/24  0725   MAGNESIUM mg/dL 2.2 2.3 2.2   PHOSPHORUS mg/dL 1.9* 1.6* 2.6       Results from last 7 days   Lab Units 01/27/24  0504 01/24/24  0326 01/23/24  0418   URIC ACID mg/dL 4.5 5.6 5.9       Results from last 7 days   Lab Units 01/27/24  0504 01/26/24  0514 01/25/24  0725 01/24/24  0326 01/23/24  0458   WBC 10*3/mm3 12.10* 15.73* 23.98* 15.86* 21.70*   HEMOGLOBIN g/dL 9.3* 10.3* 10.9* 10.1* 11.8*   PLATELETS 10*3/mm3 301 283 253 218 262             Assessment / Plan     ASSESSMENT:  Acute kidney injury.  Nonoliguric.  Multifactorial including obstructive uropathy with requirement of coude catheter placement.  Taking nonsteroidals prior to admission.  Creatinine 0.98 today.  Has nephrotic range proteinuria noted on his protein to creatinine ratio and 24-hour urine collection. SUMAYA is pending. US no hydro.  2.  Altered mental status improving.  3.  Ankylosing spondylitis, uveitis.  On methotrexate as an outpatient..  4.  Klebsiella urinary tract infection.  Treated  5.  Hypertension.  Reasonably controlled.  6.  BPH with urinary retention.  History of retention in the past.  Coudé catheter placed and patient on Flomax.  7. Hypokalemia and hypophosphatemia.    PLAN:  Continue the same treatment  Will need further monitoring of proteinuria now that his renal function has improved. GN w/u maybe needed.  Replace K and phos.  Surveillance labs    Thank you for  involving us in the care of Anthony Gallegos.  Please feel free to call with any questions.    Alexandra Bro MD  01/27/24  12:27 [facility]    [facility]  [phone]

## 2024-01-27 NOTE — THERAPY TREATMENT NOTE
Patient Name: Anthony Gallegos  : 1945    MRN: [id number]                              Today's Date: 2024       Admit Date: 2024    Visit Dx:     ICD-10-CM ICD-9-CM   1. Altered mental status, unspecified altered mental status type  R41.82 780.97   2. Leukocytosis, unspecified type  D72.829 288.60     Patient Active Problem List   Diagnosis    Ankylosing spondylitis of cervical region    Recent unexplained weight loss    Abnormal serum lipase level    Abnormal serum level of amylase    Hypertension    Boil    Immobility    Fall from bed    Tetrahydrocannabinol (THC) use disorder, mild, abuse    Generalized pain        Grief    DISH (disseminated idiopathic skeletal hyperostosis)    Generalized weakness    Severe malnutrition    Altered mental status    Transient alteration of awareness    GERD without esophagitis    BPH (benign prostatic hyperplasia)    Leukocytosis    UTI (urinary tract infection)    Dehydration    MARTITA (acute kidney injury)    Hyperglycemia    Altered mental state     Past Medical History:   Diagnosis Date    Abscess of scrotum     Arthritis     AS (ankylosing spondylitis)     Hypertension     Tetrahydrocannabinol (THC) use disorder, mild, abuse 2023    Uveitis      Past Surgical History:   Procedure Laterality Date    COLONOSCOPY      ROTATOR CUFF REPAIR Right     TOTAL HIP ARTHROPLASTY Left       General Information       Row Name 24 1702          Physical Therapy Time and Intention    Document Type therapy note (daily note)  -     Mode of Treatment individual therapy;physical therapy  -       Row Name 24 170          General Information    Patient Profile Reviewed yes  -     Existing Precautions/Restrictions fall  -       Row Name 24 170          Cognition    Orientation Status (Cognition) oriented x 3  -       Row Name 24 170          Safety Issues, Functional Mobility    Safety Issues Affecting Function (Mobility)  judgment;problem-solving;safety precaution awareness  -     Impairments Affecting Function (Mobility) balance;coordination;endurance/activity tolerance;grasp;motor control  -               User Key  (r) = Recorded By, (t) = Taken By, (c) = Cosigned By      Initials Name Provider Type    Rosa Asencio PTA Physical Therapist Assistant                   Mobility       Row Name 01/27/24 1707          Bed Mobility    Supine-Sit [place] (Bed Mobility) 2 person assist;maximum assist (25% patient effort);verbal cues;nonverbal cues (demo/gesture)  -     Sit-Supine [place] (Bed Mobility) maximum assist (25% patient effort);dependent (less than 25% patient effort);2 person assist  -     Assistive Device (Bed Mobility) draw sheet  -       Row Name 01/27/24 1707          Bed-Chair Transfer    Comment, (Bed-Chair Transfer) not tested, poor sitting balance, heavy post lean  -       Row Name 01/27/24 1707          Sit-Stand Transfer    Sit-Stand [place] (Transfers) unable to assess  -               User Key  (r) = Recorded By, (t) = Taken By, (c) = Cosigned By      Initials Name Provider Type    Rosa Asencio PTA Physical Therapist Assistant                   Obj/Interventions       Row Name 01/27/24 1711          Motor Skills    Therapeutic Exercise --  QS, LAQs w/limited knee ext, x10 reps, elbow push-ups, ant /post trunk strengthening x5,  -       Row Name 01/27/24 1711          Balance    Static Sitting Balance minimal assist;moderate assist;verbal cues  -     Dynamic Sitting Balance moderate assist;maximum assist;verbal cues  -               User Key  (r) = Recorded By, (t) = Taken By, (c) = Cosigned By      Initials Name Provider Type    Rosa Asencio PTA Physical Therapist Assistant                   Goals/Plan    No documentation.                  Clinical Impression       Row Name 01/27/24 1713          Pain    Pretreatment Pain Rating 0/10 - no pain  -[facility]  Name 01/27/24 1713          Plan of Care Review    Plan of Care Reviewed With patient  -     Outcome Evaluation Pt agreed to PT session, much different level of assist today, poor dynamic sitting balance, heavy post lean, alexei EOB strengthening/stretching activities, LE exer w/assist x10 reps, pt req MAX2 sup/sit, max/dep 2 back into bed, will attempt standing if appropriate next session, plans SNU  -       Row Name 01/27/24 1713          Therapy Assessment/Plan (PT)    Rehab Potential (PT) fair, will monitor progress closely  -     Criteria for Skilled Interventions Met (PT) yes  -       Row Name 01/27/24 1713          Positioning and Restraints    Pre-Treatment Position in bed  -     Post Treatment Position bed  -JM     In Bed fowlers;call light within reach;encouraged to call for assist;exit alarm on;notified nsg  -JM     In Chair heels elevated  -               User Key  (r) = Recorded By, (t) = Taken By, (c) = Cosigned By      Initials Name Provider Type    Rosa Asencio PTA Physical Therapist Assistant                   Outcome Measures       Row Name 01/27/24 1716 01/27/24 0956       How much help from another person do you currently need...    Turning from your back to your side while in flat bed without using bedrails? 2  - 2  -CT    Moving from lying on back to sitting on the side of a flat bed without bedrails? 2  - 2  -CT    Moving to and from a bed to a chair (including a wheelchair)? 1  - 2  -CT    Standing up from a chair using your arms (e.g., wheelchair, bedside chair)? 1  - 2  -CT    Climbing 3-5 steps with a railing? 1  - 1  -CT    To walk in hospital room? 1  - 2  -CT    AM-PAC 6 Clicks Score (PT) 8  - 11  -CT    Highest Level of Mobility Goal 3 --> Sit at edge of bed  -JM 4 --> Transfer to chair/commode  -CT              User Key  (r) = Recorded By, (t) = Taken By, (c) = Cosigned By      Initials Name Provider Type    Rosa Asencio PTA Physical Therapist  Assistant    CT Sergio, Jena Ro RN Registered Nurse                                 Physical Therapy Education       Title: PT OT SLP Therapies (Done)       Topic: Physical Therapy (Done)       Point: Mobility training (Done)       Learning Progress Summary             Patient Acceptance, E,TB,D, VU,NR by AMELIA at 1/27/2024 1716    Acceptance, E, VU by CECI at 1/27/2024 0409    Acceptance, E, NR by LOUIS at 1/26/2024 1207    Acceptance, E, VU by CECI at 1/26/2024 0147    Acceptance, E, VU,NR by LOUIS at 1/25/2024 1151    Acceptance, E, VU by GEETA at 1/24/2024 1404    Acceptance, E,D, VU,NR by AMELIA at 1/23/2024 1744    Acceptance, E, VU,NR by CLARISSA at 1/22/2024 1427                         Point: Home exercise program (Done)       Learning Progress Summary             Patient Acceptance, E,TB,D, VU,NR by AMELIA at 1/27/2024 1716    Acceptance, E, VU by CECI at 1/27/2024 0409    Acceptance, E, NR by LOUIS at 1/26/2024 1207    Acceptance, E, VU by CECI at 1/26/2024 0147    Acceptance, E, VU,NR by LOUIS at 1/25/2024 1151    Acceptance, E, VU by GEETA at 1/24/2024 1404    Acceptance, E,D, VU,NR by AMELIA at 1/23/2024 1744    Acceptance, E, VU,NR by CLARISSA at 1/22/2024 1427                         Point: Body mechanics (Done)       Learning Progress Summary             Patient Acceptance, E,TB,D, VU,NR by AMELIA at 1/27/2024 1716    Acceptance, E, VU by CECI at 1/27/2024 0409    Acceptance, E, NR by LOUIS at 1/26/2024 1207    Acceptance, E, VU by CECI at 1/26/2024 0147    Acceptance, E, VU,NR by LOUIS at 1/25/2024 1151    Acceptance, E,D, VU,NR by AMELIA at 1/23/2024 1744    Acceptance, E, VU,NR by CLARISSA at 1/22/2024 1427                         Point: Precautions (Done)       Learning Progress Summary             Patient Acceptance, E,TB,D, VU,NR by AMELIA at 1/27/2024 1716    Acceptance, E, VU by BB at 1/27/2024 0409    Acceptance, E, NR by LOUIS at 1/26/2024 1207    Acceptance, E, VU by BB at 1/26/2024 0147    Acceptance, E, VU,NR by DJ at 1/25/2024 1151    Acceptance, E,D, VU,NR by  AMELIA at 1/23/2024 1744    Acceptance, E, VU,NR by DB at 1/22/2024 1427                                         User Key       Initials Effective Dates Name Provider Type Discipline    EM 06/16/21 -  Cierra Valdivia, PT Physical Therapist PT    AMELIA 03/07/18 -  Rosa Portillo PTA Physical Therapist Assistant PT    DB 06/16/21 -  Felipa Weathers, PT Physical Therapist PT    DJ 10/25/19 -  Roxi Schofield, PT Physical Therapist PT    BB 11/16/23 -  Talisha Patel RN Registered Nurse Nurse                  PT Recommendation and Plan     Plan of Care Reviewed With: patient  Progress: declining  Outcome Evaluation: Pt agreed to PT session, much different level of assist today, poor dynamic sitting balance, heavy post lean, alexei EOB strengthening/stretching activities, LE exer w/assist x10 reps, pt req MAX2 sup/sit, max/dep 2 back into bed, will attempt standing if appropriate next session, plans SNU     Time Calculation:         PT Charges       Row Name 01/27/24 1701             Time Calculation    Start Time 1524  -      Stop Time 1554  -      Time Calculation (min) 30 min  -      PT Received On 01/27/24  -AMELIA      PT - Next Appointment 01/29/24  -                User Key  (r) = Recorded By, (t) = Taken By, (c) = Cosigned By      Initials Name Provider Type    Rosa Asencio PTA Physical Therapist Assistant                  Therapy Charges for Today       Code Description Service Date Service Provider Modifiers Qty    65611480869 HC PT THER PROC EA 15 MIN 1/27/2024 Rosa Portillo PTA GP 2    09989054506 HC PT THER SUPP EA 15 MIN 1/27/2024 Rosa Portillo PTA GP 2            PT G-Codes  Outcome Measure Options: AM-PAC 6 Clicks Daily Activity (OT), Modified [place]  AM-PAC 6 Clicks Score (PT): 8  AM-PAC 6 Clicks Score (OT): 11  Modified [place] Scale: 4 - Moderately severe disability.  Unable to walk without assistance, and unable to attend to own bodily needs without assistance.  PT Discharge  Summary  Anticipated Discharge Disposition (PT): skilled nursing facility    Rosa Portillo, PTA  1/27/2024

## 2024-01-27 NOTE — PLAN OF CARE
Problem: Adult Inpatient Plan of Care  Goal: Plan of Care Review  Outcome: Ongoing, Progressing  Goal: Patient-Specific Goal (Individualized)  Outcome: Ongoing, Progressing  Goal: Absence of Hospital-Acquired Illness or Injury  Outcome: Ongoing, Progressing  Intervention: Identify and Manage Fall Risk  Description: Perform standard risk assessment on admission using a validated tool or comprehensive approach appropriate to the patient; reassess fall risk frequently, with change in status or transfer to another level of care.  Communicate fall injury risk to interprofessional healthcare team.  Determine need for increased observation, equipment and environmental modification, such as low bed, signage and supportive, nonskid footwear.  Adjust safety measures to individual developmental age, stage and identified risk factors.  Reinforce the importance of safety and physical activity with patient and family.  Perform regular intentional rounding to assess need for position change, pain assessment and personal needs, including assistance with toileting.  Intervention: Prevent Skin Injury  Description: Perform a screening for skin injury risk, such as pressure or moisture associated skin damage on admission and at regular intervals throughout hospital stay.  Keep all areas of skin (especially folds) clean and dry.  Maintain adequate skin hydration.  Relieve and redistribute pressure and protect bony prominences; implement measures based on patient-specific risk factors.  Match turning and repositioning schedule to clinical condition.  Encourage weight shift frequently; assist with reposition if unable to complete independently.  Float heels off bed; avoid pressure on the Achilles tendon.  Keep skin free from extended contact with medical devices.  Encourage functional activity and mobility, as early as tolerated.  Use aids (e.g., slide boards, mechanical lift) during transfer.  Intervention: Prevent and Manage VTE  [FreeTextEntry1] : R/B/A ORTHOVISC REVIEWED\par ORTHOVISC #3 RIGHT KNEE TODAY, TOLERATED WELL\par RTO 1 WEEK TO CONTINUE\par \par The natural progression of Osteoarthritis was explained to the patient. We discussed the possible treatment options from conservative to operative. These included NSAIDS, Glucosamine and Chondrotin sulfate, and Physical Therapy as well different types of injections. We also discussed that at some point they may progress to needed a TKA. Information and pamphlets were given.\par \par I explained to the patient that OTC pain medication, ice, elevation, compression and rest would benefit them. They may return to activity after follow-up or when they no longer have any pain.\par \par The patient will ice, rest, and elevate the area to help with swelling.\par \par Patient is to begin over the counter oral anti-inflammatory medications on an as needed basis, as long as there are no medical contra-indications. Patient is counseled on possible GI and blood pressure side effects.\par \par  (Venous Thromboembolism) Risk  Description: Assess for VTE (venous thromboembolism) risk.  Encourage and assist with early ambulation.  Initiate and maintain compression or other therapy, as indicated, based on identified risk in accordance with organizational protocol and provider order.  Encourage both active and passive leg exercises while in bed, if unable to ambulate.  Intervention: Prevent Infection  Description: Maintain skin and mucous membrane integrity; promote hand, oral and pulmonary hygiene.  Optimize fluid balance, nutrition, sleep and glycemic control to maximize infection resistance.  Identify potential sources of infection early to prevent or mitigate progression of infection (e.g., wound, lines, devices).  Evaluate ongoing need for invasive devices; remove promptly when no longer indicated.  Goal: Optimal Comfort and Wellbeing  Outcome: Ongoing, Progressing  Intervention: Provide Person-Centered Care  Description: Use a family-focused approach to care.  Develop trust and rapport by proactively providing information, encouraging questions, addressing concerns and offering reassurance.  Acknowledge emotional response to hospitalization.  Recognize and utilize personal coping strategies.  Honor spiritual and cultural preferences.  Goal: Readiness for Transition of Care  Outcome: Ongoing, Progressing     Problem: UTI (Urinary Tract Infection)  Goal: Improved Infection Symptoms  Outcome: Ongoing, Progressing     Problem: Fall Injury Risk  Goal: Absence of Fall and Fall-Related Injury  Outcome: Ongoing, Progressing  Intervention: Identify and Manage Contributors  Description: Develop a fall prevention plan with the patient and caregiver/family.  Provide reorientation, appropriate sensory stimulation and routines with changes in mental status to decrease risk of fall.  Promote use of personal vision and auditory aids.  Assess assistance level required for safe and effective self-care; provide support as  needed, such as toileting, mobilization. For age 65 and older, implement timed toileting with assistance.  Encourage physical activity, such as performance of mobility and self-care at highest level of patient ability, multicomponent exercise program and provision of appropriate assistive devices.  If fall occurs, assess the severity of injury; implement fall injury protocol. Determine the cause and revise fall injury prevention plan.  Regularly review medication contribution to fall risk; adjust medication administration times to minimize risk of falling.  Consider risk related to polypharmacy and age.  Balance adequate pain management with potential for oversedation.  Intervention: Promote Injury-Free Environment  Description: Provide a safe, barrier-free environment that encourages independent activity.  Keep care area uncluttered and well-lighted.  Determine need for increased observation or monitoring.  Avoid use of devices that minimize mobility, such as restraints or indwelling urinary catheter.     Problem: Skin Injury Risk Increased  Goal: Skin Health and Integrity  Outcome: Ongoing, Progressing  Intervention: Optimize Skin Protection  Description: Perform a full pressure injury risk assessment, as indicated by screening, upon admission to care unit.  Reassess skin (injury risk, full inspection) frequently (e.g., scheduled interval, with change in condition) to provide optimal early detection and prevention.  Maintain adequate tissue perfusion (e.g., encourage fluid balance; avoid crossing legs, constrictive clothing or devices) to promote tissue oxygenation.  Maintain head of bed at lowest degree of elevation tolerated, considering medical condition and other restrictions.  Avoid positioning onto an area that remains reddened.  Minimize incontinence and moisture (e.g., toileting schedule; moisture-wicking pad, diaper or incontinence collection device; skin moisture barrier).  Cleanse skin promptly and  gently when soiled utilizing a pH-balanced cleanser.  Relieve and redistribute pressure (e.g., scheduled position changes, weight shifts, use of support surface, medical device repositioning, protective dressing application, use of positioning device, microclimate control, use of pressure-injury-monitor  Encourage increased activity, such as sitting in a chair at the bedside or early mobilization, when able to tolerate.   Goal Outcome Evaluation:  Plan of Care Reviewed With: patient        Progress: no change

## 2024-01-27 NOTE — PLAN OF CARE
Goal Outcome Evaluation:  Plan of Care Reviewed With: patient        Progress: declining  Outcome Evaluation: Pt agreed to PT session, much different level of assist today, poor dynamic sitting balance, heavy post lean, alexei EOB strengthening/stretching activities, LE exer w/assist x10 reps, pt req MAX2 sup/sit, max/dep 2 back into bed, will attempt standing if appropriate next session, plans SNU      Anticipated Discharge Disposition (PT): skilled nursing facility

## 2024-01-27 NOTE — PLAN OF CARE
Goal Outcome Evaluation:  Plan of Care Reviewed With: patient        Progress: improving  Outcome Evaluation: Patient is resting, forde draining well after irrigation, on room air, no new issues with him

## 2024-01-28 ENCOUNTER — APPOINTMENT (OUTPATIENT)
Dept: GENERAL RADIOLOGY | Facility: HOSPITAL | Age: 79
DRG: 690 | End: 2024-01-28
Payer: MEDICARE

## 2024-01-28 LAB
ANION GAP SERPL CALCULATED.3IONS-SCNC: 8 MMOL/L (ref 5–15)
BACTERIA ISLT: NORMAL
BASOPHILS # BLD AUTO: 0.05 10*3/MM3 (ref 0–0.2)
BASOPHILS NFR BLD AUTO: 0.6 % (ref 0–1.5)
BUN SERPL-MCNC: 16 MG/DL (ref 8–23)
BUN/CREAT SERPL: 20.3 (ref 7–25)
CALCIUM SPEC-SCNC: 7.6 MG/DL (ref 8.6–10.5)
CHLORIDE SERPL-SCNC: 105 MMOL/L (ref 98–107)
CO2 SERPL-SCNC: 22 MMOL/L (ref 22–29)
CREAT SERPL-MCNC: 0.79 MG/DL (ref 0.76–1.27)
DEPRECATED RDW RBC AUTO: 44.1 FL (ref 37–54)
EGFRCR SERPLBLD CKD-EPI 2021: 90.4 ML/MIN/1.73
EOSINOPHIL # BLD AUTO: 0.25 10*3/MM3 (ref 0–0.4)
EOSINOPHIL NFR BLD AUTO: 2.9 % (ref 0.3–6.2)
ERYTHROCYTE [DISTWIDTH] IN BLOOD BY AUTOMATED COUNT: 13.1 % (ref 12.3–15.4)
GLUCOSE SERPL-MCNC: 89 MG/DL (ref 65–99)
HCT VFR BLD AUTO: 30 % (ref 37.5–51)
HGB BLD-MCNC: 9.8 G/DL (ref 13–17.7)
IMM GRANULOCYTES # BLD AUTO: 0.12 10*3/MM3 (ref 0–0.05)
IMM GRANULOCYTES NFR BLD AUTO: 1.4 % (ref 0–0.5)
LYMPHOCYTES # BLD AUTO: 1.18 10*3/MM3 (ref 0.7–3.1)
LYMPHOCYTES NFR BLD AUTO: 13.5 % (ref 19.6–45.3)
MAGNESIUM SERPL-MCNC: 2.2 MG/DL (ref 1.6–2.4)
MCH RBC QN AUTO: 30.2 PG (ref 26.6–33)
MCHC RBC AUTO-ENTMCNC: 32.7 G/DL (ref 31.5–35.7)
MCV RBC AUTO: 92.6 FL (ref 79–97)
MONOCYTES # BLD AUTO: 1.16 10*3/MM3 (ref 0.1–0.9)
MONOCYTES NFR BLD AUTO: 13.3 % (ref 5–12)
NEUTROPHILS NFR BLD AUTO: 5.98 10*3/MM3 (ref 1.7–7)
NEUTROPHILS NFR BLD AUTO: 68.3 % (ref 42.7–76)
NRBC BLD AUTO-RTO: 0 /100 WBC (ref 0–0.2)
PLATELET # BLD AUTO: 346 10*3/MM3 (ref 140–450)
PMV BLD AUTO: 9.2 FL (ref 6–12)
POTASSIUM SERPL-SCNC: 3.9 MMOL/L (ref 3.5–5.2)
RBC # BLD AUTO: 3.24 10*6/MM3 (ref 4.14–5.8)
SODIUM SERPL-SCNC: 135 MMOL/L (ref 136–145)
URATE SERPL-MCNC: 4.2 MG/DL (ref 3.4–7)
WBC NRBC COR # BLD AUTO: 8.74 10*3/MM3 (ref 3.4–10.8)

## 2024-01-28 PROCEDURE — 80048 BASIC METABOLIC PNL TOTAL CA: CPT | Performed by: INTERNAL MEDICINE

## 2024-01-28 PROCEDURE — 83735 ASSAY OF MAGNESIUM: CPT | Performed by: STUDENT IN AN ORGANIZED HEALTH CARE EDUCATION/TRAINING PROGRAM

## 2024-01-28 PROCEDURE — 73560 X-RAY EXAM OF KNEE 1 OR 2: CPT

## 2024-01-28 PROCEDURE — 25010000002 ENOXAPARIN PER 10 MG: Performed by: HOSPITALIST

## 2024-01-28 PROCEDURE — 84550 ASSAY OF BLOOD/URIC ACID: CPT | Performed by: STUDENT IN AN ORGANIZED HEALTH CARE EDUCATION/TRAINING PROGRAM

## 2024-01-28 PROCEDURE — 25010000002 CEFTRIAXONE PER 250 MG: Performed by: STUDENT IN AN ORGANIZED HEALTH CARE EDUCATION/TRAINING PROGRAM

## 2024-01-28 PROCEDURE — 85025 COMPLETE CBC W/AUTO DIFF WBC: CPT | Performed by: STUDENT IN AN ORGANIZED HEALTH CARE EDUCATION/TRAINING PROGRAM

## 2024-01-28 RX ADMIN — CEFTRIAXONE 2000 MG: 2 INJECTION, POWDER, FOR SOLUTION INTRAMUSCULAR; INTRAVENOUS at 20:32

## 2024-01-28 RX ADMIN — SENNOSIDES AND DOCUSATE SODIUM 2 TABLET: 50; 8.6 TABLET ORAL at 08:48

## 2024-01-28 RX ADMIN — TAMSULOSIN HYDROCHLORIDE 0.4 MG: 0.4 CAPSULE ORAL at 20:32

## 2024-01-28 RX ADMIN — ENOXAPARIN SODIUM 30 MG: 100 INJECTION SUBCUTANEOUS at 08:48

## 2024-01-28 RX ADMIN — PANTOPRAZOLE SODIUM 40 MG: 40 TABLET, DELAYED RELEASE ORAL at 05:23

## 2024-01-28 NOTE — PROGRESS NOTES
[facility]    [phone]   LOS: 6 days     Name: Anthony Gallegos  Age/Sex: 78 y.o. male  :  1945        PCP: Provider, No Known  Chief Complaint   Patient presents with    Dysuria    Altered Mental Status      Subjective   No acute events overnight.  Patient doing well today.  Sitting up in bed.  He is complaining of right knee pain that is tender to palpation.  No chest pain or shortness of breath.  He is fully oriented.    cefTRIAXone, 2,000 mg, Intravenous, Q24H  enoxaparin, 30 mg, Subcutaneous, Q24H  pantoprazole, 40 mg, Oral, Q AM  potassium & sodium phosphates, 2 packet, Oral, Once  senna-docusate sodium, 2 tablet, Oral, BID  tamsulosin, 0.4 mg, Oral, Nightly           Objective   Vital Signs  Temp:  [98.4 °F (36.9 °C)-99.5 °F (37.5 °C)] 98.6 °F (37 °C)  Heart Rate:  [76-86] 76  Resp:  [18] 18  BP: (114-135)/(53-58) 125/57  Body mass index is 21.41 kg/m².    Intake/Output Summary (Last 24 hours) at 2024 1237  Last data filed at 2024 0530  Gross per 24 hour   Intake --   Output 3500 ml   Net -3500 ml     General: Alert, no acute distress.  Sitting up in bed this morning.  Fully oriented.  Chronically ill and frail appearing.  ENT: No conjunctival injection or scleral icterus. Moist mucous membranes.   Neuro: Eyes open and moving in all directions, strength normal in all extremities, no focal deficits.   Lungs: Clear to auscultation bilaterally. No wheeze or crackles. No distress.   Heart: RRR, no murmurs. No edema.  Abdomen: Soft, non-tender, non-distended. Normal bowel sounds.   Ext: Right knee with swelling and warmth.  Tender to palpation.    Skin: No rashes or lesions. IV site without swelling or erythema.     Results Review:       I reviewed the patient's new clinical results.  Results from last 7 days   Lab Units 24  0443 24  0504 24  0514 24  0725 24  0326 24  0458 24  0437   WBC 10*3/mm3 8.74 12.10* 15.73* 23.98* 15.86*  21.70* 15.81*   HEMOGLOBIN g/dL 9.8* 9.3* 10.3* 10.9* 10.1* 11.8* 13.1   PLATELETS 10*3/mm3 346 301 283 253 218 262 350     Results from last 7 days   Lab Units 01/28/24  0443 01/27/24  1738 01/27/24  0504 01/26/24  0514 01/25/24  0725 01/24/24  0326 01/23/24  0418 01/22/24  0437   SODIUM mmol/L 135*  --  134* 137 139 138 139 138   POTASSIUM mmol/L 3.9 4.0 3.5 3.6 3.7 4.0 4.7 4.5   CHLORIDE mmol/L 105  --  104 107 109* 107 105 101   CO2 mmol/L 22.0  --  22.5 19.7* 20.0* 21.9* 20.6* 23.4   BUN mg/dL 16  --  22 32* 35* 41* 45* 30*   CREATININE mg/dL 0.79  --  0.98 1.50* 1.69* 1.64* 2.15* 1.58*   CALCIUM mg/dL 7.6*  --  7.3* 7.5* 7.4* 7.1* 8.0* 8.8   MAGNESIUM mg/dL 2.2  --  2.2 2.3 2.2 2.4 2.5*  --    PHOSPHORUS mg/dL  --  2.9 1.9* 1.6* 2.6 2.6  --   --    Estimated Creatinine Clearance: 83.3 mL/min (by C-G formula based on SCr of 0.79 mg/dL).      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       PLAN  Mr. Gallegos is a 78 y.o. male with a history of DISH, hypertension, ankylosing spondylitis and impaired mobility who presented to [facility] initially complaining of confusion and was found to have urinary tract infection and admitted to monitored unit.    -Urinary tract infection: CT scan of the abdomen and pelvis yesterday showed continued bladder distention and some evidence of possible  pyelonephritis with hydronephrosis and stranding.  Urine culture now growing Klebsiella.  Has been afebrile for greater than 24 hours.  Continue ceftriaxone 2 g every 24 hours x 10 days to cover for pyelonephritis.    -Right knee pain: Patient complaining of right knee pain.  Obviously warm, tender, and swollen.  Going to check an XR.  Definitely seems like there could be an effusion, so will consult orthopedic surgery to possibly do arthrocentesis.  Gout versus septic joint on differential.    -Urinary retention: Mariscal catheter was placed and urology was consulted.  Urine continues to be blood-tinged.  If the Mariscal continues to clog, may need to place larger urinary catheter.  Patient going to be discharged with Mariscal in place.  Urology has now signed off.    -Dysphagia: SLP evaluated.  Recommending soft, ground meats with thin liquids.  Meds whole in puréed.  No straws.  Continuing to follow.    -Leukocytosis: WBC has now normalized.  No obvious new source of infection.  Clinically patient appears to be improving, has been afebrile for over 24 hours.  Blood cultures remain no growth to date.    -Acute renal failure, electrolyte derangements: Creatinine normal today.  Baseline is normal.  Corrected calcium has been normal.  Avoid nephrotoxic agents including NSAIDs and contrast dyes.  Electrolyte placement protocol.  Monitor with daily BMP.  Nephrology consulted and following.  Appreciate their assistance.    -Anemia: Hgb 9.8 today, baseline appears around 12.  No signs of active bleeding. Given that Hgb has dropped, will check fecal occult.  Monitor with daily CBC and transfuse for Hgb less than 7.    -Lovenox for DVT prophylaxis  -Full code    Patient is clinically doing well.  Pre certification process for rehab was started by case management, and referrals for facilities were sent.  They are continuing to follow.  Patient pending placement at this time.     Mireya Basilio MD  [facility]

## 2024-01-28 NOTE — PROGRESS NOTES
[facility] Progress Note      Patient Name: Anthony Gallegos  : 1945  MRN: [id number]  Primary Care Physician:  Provider, No Known  Date of admission: 2024    Subjective     Interval History:   Follow-up acute kidney injury.    Feeling about the same, flat in bed, oxygen on.  No nausea or vomiting.  3500 cc urine output recorded last 24 hours.    Review of Systems:   As noted above    Objective     Vitals:   Temp:  [98.4 °F (36.9 °C)-99.5 °F (37.5 °C)] 98.6 °F (37 °C)  Heart Rate:  [76-86] 76  Resp:  [18] 18  BP: (114-135)/(53-58) 125/57    Intake/Output Summary (Last 24 hours) at 2024 0958  Last data filed at 2024 0530  Gross per 24 hour   Intake --   Output 3500 ml   Net -3500 ml       Physical Exam:    General Appearance: alert, awake, chronically ill, no acute distress  Skin: warm and dry  HEENT: Edentulous.    Neck: supple, no JVD  Lungs: Clear to auscultation, unlabored breathing effort.  Heart: RRR, no rub  Abdomen: soft, he had significant tenderness to palpation below his umbilicus, nondistended.  Normoactive bowel  : coude catheter, bloody urine noted in the bag.  Extremities: Trace edema..  Neuro: Moving all extremities    Scheduled Meds:     cefTRIAXone, 2,000 mg, Intravenous, Q24H  enoxaparin, 30 mg, Subcutaneous, Q24H  pantoprazole, 40 mg, Oral, Q AM  potassium & sodium phosphates, 2 packet, Oral, Once  senna-docusate sodium, 2 tablet, Oral, BID  tamsulosin, 0.4 mg, Oral, Nightly      IV Meds:          Results Reviewed:   I have personally reviewed the results from the time of this admission to 2024 09:58 EST     Results from last 7 days   Lab Units 24  0443 24  1738 24  0504 24  0514 24  0437 24  1852   SODIUM mmol/L 135*  --  134* 137   < > 139   POTASSIUM mmol/L 3.9 4.0 3.5 3.6   < > 4.7   CHLORIDE mmol/L 105  --  104 107   < > 100   CO2 mmol/L 22.0  --  22.5 19.7*   < > 26.0   BUN mg/dL 16  --  22 32*   <  > 27*   CREATININE mg/dL 0.79  --  0.98 1.50*   < > 1.79*   CALCIUM mg/dL 7.6*  --  7.3* 7.5*   < > 9.5   BILIRUBIN mg/dL  --   --   --   --   --  0.5   ALK PHOS U/L  --   --   --   --   --  80   ALT (SGPT) U/L  --   --   --   --   --  22   AST (SGOT) U/L  --   --   --   --   --  47*   GLUCOSE mg/dL 89  --  89 95   < > 123*    < > = values in this interval not displayed.       Estimated Creatinine Clearance: 83.3 mL/min (by C-G formula based on SCr of 0.79 mg/dL).    Results from last 7 days   Lab Units 01/28/24  0443 01/27/24  1738 01/27/24  0504 01/26/24  0514   MAGNESIUM mg/dL 2.2  --  2.2 2.3   PHOSPHORUS mg/dL  --  2.9 1.9* 1.6*       Results from last 7 days   Lab Units 01/27/24  0504 01/24/24  0326 01/23/24  0418   URIC ACID mg/dL 4.5 5.6 5.9       Results from last 7 days   Lab Units 01/28/24  0443 01/27/24  0504 01/26/24  0514 01/25/24  0725 01/24/24  0326   WBC 10*3/mm3 8.74 12.10* 15.73* 23.98* 15.86*   HEMOGLOBIN g/dL 9.8* 9.3* 10.3* 10.9* 10.1*   PLATELETS 10*3/mm3 346 301 283 253 218             Assessment / Plan     ASSESSMENT:  Acute kidney injury.  Nonoliguric.  Multifactorial including obstructive uropathy with requirement of coude catheter placement.  Taking nonsteroidals prior to admission.  Creatinine 0.98 today.  Has nephrotic range proteinuria noted on his protein to creatinine ratio and 24-hour urine collection. SUMAYA is pending. US no hydro.  2.  Altered mental status improving.  3.  Ankylosing spondylitis, uveitis.  On methotrexate as an outpatient..  4.  Klebsiella urinary tract infection.  Treated  5.  Hypertension.  Reasonably controlled.  6.  BPH with urinary retention.  History of retention in the past.  Coudé catheter placed and patient on Flomax.  7. Hypokalemia and hypophosphatemia.    PLAN:  Continue the same treatment  Will need further monitoring of proteinuria now that his renal function has improved. GN w/u maybe needed.  Replaced K and phos.  Recheck in AM.  Surveillance  labs    Thank you for involving us in the care of Anthony Gallegos.  Please feel free to call with any questions.    Alexandra Bro MD  01/28/24  09:58 [facility]    [facility]  [phone]

## 2024-01-28 NOTE — PLAN OF CARE
Goal Outcome Evaluation:  Plan of Care Reviewed With: patient        Progress: improving  Outcome Evaluation: Patient is AOX3, Mariscal draining with no clotts, No change in condition

## 2024-01-29 LAB
ALBUMIN SERPL-MCNC: 2.1 G/DL (ref 3.5–5.2)
ANION GAP SERPL CALCULATED.3IONS-SCNC: 8.4 MMOL/L (ref 5–15)
BASOPHILS # BLD AUTO: 0.06 10*3/MM3 (ref 0–0.2)
BASOPHILS NFR BLD AUTO: 0.7 % (ref 0–1.5)
BUN SERPL-MCNC: 13 MG/DL (ref 8–23)
BUN/CREAT SERPL: 19.7 (ref 7–25)
CALCIUM SPEC-SCNC: 7.8 MG/DL (ref 8.6–10.5)
CHLORIDE SERPL-SCNC: 102 MMOL/L (ref 98–107)
CO2 SERPL-SCNC: 24.6 MMOL/L (ref 22–29)
CREAT SERPL-MCNC: 0.66 MG/DL (ref 0.76–1.27)
DEPRECATED RDW RBC AUTO: 42.5 FL (ref 37–54)
EGFRCR SERPLBLD CKD-EPI 2021: 95.4 ML/MIN/1.73
EOSINOPHIL # BLD AUTO: 0.25 10*3/MM3 (ref 0–0.4)
EOSINOPHIL NFR BLD AUTO: 3 % (ref 0.3–6.2)
ERYTHROCYTE [DISTWIDTH] IN BLOOD BY AUTOMATED COUNT: 12.8 % (ref 12.3–15.4)
GLUCOSE SERPL-MCNC: 81 MG/DL (ref 65–99)
HCT VFR BLD AUTO: 30.5 % (ref 37.5–51)
HGB BLD-MCNC: 10.2 G/DL (ref 13–17.7)
IGA SERPL-MCNC: 498 MG/DL (ref 61–437)
IGG SERPL-MCNC: 1476 MG/DL (ref 603–1613)
IGM SERPL-MCNC: 64 MG/DL (ref 15–143)
IMM GRANULOCYTES # BLD AUTO: 0.12 10*3/MM3 (ref 0–0.05)
IMM GRANULOCYTES NFR BLD AUTO: 1.4 % (ref 0–0.5)
KAPPA LC FREE SER-MCNC: 167.8 MG/L (ref 3.3–19.4)
KAPPA LC FREE/LAMBDA FREE SER: 1.77 {RATIO} (ref 0.26–1.65)
LAMBDA LC FREE SERPL-MCNC: 94.9 MG/L (ref 5.7–26.3)
LYMPHOCYTES # BLD AUTO: 1.17 10*3/MM3 (ref 0.7–3.1)
LYMPHOCYTES NFR BLD AUTO: 14 % (ref 19.6–45.3)
MAGNESIUM SERPL-MCNC: 1.8 MG/DL (ref 1.6–2.4)
MCH RBC QN AUTO: 30.6 PG (ref 26.6–33)
MCHC RBC AUTO-ENTMCNC: 33.4 G/DL (ref 31.5–35.7)
MCV RBC AUTO: 91.6 FL (ref 79–97)
MONOCYTES # BLD AUTO: 1.13 10*3/MM3 (ref 0.1–0.9)
MONOCYTES NFR BLD AUTO: 13.5 % (ref 5–12)
NEUTROPHILS NFR BLD AUTO: 5.62 10*3/MM3 (ref 1.7–7)
NEUTROPHILS NFR BLD AUTO: 67.4 % (ref 42.7–76)
NRBC BLD AUTO-RTO: 0 /100 WBC (ref 0–0.2)
PHOSPHATE SERPL-MCNC: 2.5 MG/DL (ref 2.5–4.5)
PLATELET # BLD AUTO: 364 10*3/MM3 (ref 140–450)
PMV BLD AUTO: 9.9 FL (ref 6–12)
POTASSIUM SERPL-SCNC: 4 MMOL/L (ref 3.5–5.2)
PROT PATTERN SERPL IFE-IMP: ABNORMAL
RBC # BLD AUTO: 3.33 10*6/MM3 (ref 4.14–5.8)
SODIUM SERPL-SCNC: 135 MMOL/L (ref 136–145)
WBC NRBC COR # BLD AUTO: 8.35 10*3/MM3 (ref 3.4–10.8)

## 2024-01-29 PROCEDURE — 83735 ASSAY OF MAGNESIUM: CPT | Performed by: STUDENT IN AN ORGANIZED HEALTH CARE EDUCATION/TRAINING PROGRAM

## 2024-01-29 PROCEDURE — 85025 COMPLETE CBC W/AUTO DIFF WBC: CPT | Performed by: STUDENT IN AN ORGANIZED HEALTH CARE EDUCATION/TRAINING PROGRAM

## 2024-01-29 PROCEDURE — 80069 RENAL FUNCTION PANEL: CPT | Performed by: INTERNAL MEDICINE

## 2024-01-29 PROCEDURE — 25010000002 ENOXAPARIN PER 10 MG: Performed by: HOSPITALIST

## 2024-01-29 PROCEDURE — 97530 THERAPEUTIC ACTIVITIES: CPT

## 2024-01-29 PROCEDURE — 99221 1ST HOSP IP/OBS SF/LOW 40: CPT | Performed by: ORTHOPAEDIC SURGERY

## 2024-01-29 PROCEDURE — 25010000002 CEFTRIAXONE PER 250 MG: Performed by: STUDENT IN AN ORGANIZED HEALTH CARE EDUCATION/TRAINING PROGRAM

## 2024-01-29 RX ADMIN — PANTOPRAZOLE SODIUM 40 MG: 40 TABLET, DELAYED RELEASE ORAL at 05:41

## 2024-01-29 RX ADMIN — TAMSULOSIN HYDROCHLORIDE 0.4 MG: 0.4 CAPSULE ORAL at 20:08

## 2024-01-29 RX ADMIN — ACETAMINOPHEN 650 MG: 325 TABLET, FILM COATED ORAL at 20:06

## 2024-01-29 RX ADMIN — ACETAMINOPHEN 650 MG: 325 TABLET, FILM COATED ORAL at 10:03

## 2024-01-29 RX ADMIN — ENOXAPARIN SODIUM 30 MG: 100 INJECTION SUBCUTANEOUS at 09:46

## 2024-01-29 RX ADMIN — CEFTRIAXONE 2000 MG: 2 INJECTION, POWDER, FOR SOLUTION INTRAMUSCULAR; INTRAVENOUS at 20:06

## 2024-01-29 RX ADMIN — SENNOSIDES AND DOCUSATE SODIUM 2 TABLET: 50; 8.6 TABLET ORAL at 09:46

## 2024-01-29 NOTE — CONSULTS
Orthopedic Consult      Patient: Anthony Gallegos    Date of Admission: 1/21/2024  5:50 PM    YOB: 1945    Medical Record Number: [id number]    Attending Physician: Casie Reardon MD    Consulting Physician: Bryce Salvador MD      Chief Complaints: Altered mental status [R41.82]  Altered mental status, unspecified altered mental status type [R41.82]  Leukocytosis, unspecified type [D72.829]  Altered mental state [R41.82]      History of Present Illness: 78 y.o. male admitted to [facility] with Altered mental status [R41.82]  Altered mental status, unspecified altered mental status type [R41.82]  Leukocytosis, unspecified type [D72.829]  Altered mental state [R41.82]. I was consulted for further evaluation and treatment of knee pain and possible effusion.  Upon questioning he states that it has been bothering him for years and he has had injections intermittently at a center in [place].  He states it has been over a year since his last knee injection.     No Known Allergies     Home Medications:  Medications Prior to Admission   Medication Sig Dispense Refill Last Dose    atenolol (TENORMIN) 50 MG tablet Take 1 tablet by mouth Daily. 30 tablet 0     esomeprazole (nexIUM) 20 MG capsule Take 1 capsule by mouth Daily As Needed (asneeded).       Lidocaine 4 % Place 2 patches on the skin as directed by provider Daily. Remove & Discard patch within 12 hours or as directed by MD 30 each 1     methotrexate 2.5 MG tablet Take 1 tablet by mouth 2 (Two) Times a Week. Pt takes every Wednesday and thursday  5     muscle rub (BenGay) 10-15 % cream cream Apply  topically to the appropriate area as directed 4 (Four) Times a Day As Needed (sore muscles). 85 g 0     Naproxen Sodium 220 MG capsule Take 220 mg of amoxicillin by mouth Daily.       Omega-3 Fatty Acids (OMEGA 3 PO) Take 1 capsule by mouth Daily.       pantoprazole (PROTONIX) 40 MG EC tablet Take 1 tablet by mouth Every Morning.       tamsulosin  (FLOMAX) 0.4 MG capsule 24 hr capsule Take 1 capsule by mouth Every Night. 30 capsule 1        Current Medications:  Scheduled Meds:cefTRIAXone, 2,000 mg, Intravenous, Q24H  enoxaparin, 30 mg, Subcutaneous, Q24H  pantoprazole, 40 mg, Oral, Q AM  potassium & sodium phosphates, 2 packet, Oral, Once  senna-docusate sodium, 2 tablet, Oral, BID  tamsulosin, 0.4 mg, Oral, Nightly      Continuous Infusions:   PRN Meds:.  acetaminophen    senna-docusate sodium **AND** polyethylene glycol **AND** bisacodyl **AND** bisacodyl    Calcium Replacement - Follow Nurse / BPA Driven Protocol    Magnesium Low Dose Replacement - Follow Nurse / BPA Driven Protocol    melatonin    ondansetron ODT **OR** ondansetron    Phosphorus Replacement - Follow Nurse / BPA Driven Protocol    Potassium Replacement - Follow Nurse / BPA Driven Protocol    Insert Peripheral IV **AND** sodium chloride    Past Medical History:   Diagnosis Date    Abscess of scrotum     Arthritis     AS (ankylosing spondylitis)     Hypertension     Tetrahydrocannabinol (THC) use disorder, mild, abuse 12/20/2023    Uveitis         Past Surgical History:   Procedure Laterality Date    COLONOSCOPY      ROTATOR CUFF REPAIR Right     TOTAL HIP ARTHROPLASTY Left 2014        Social History     Occupational History    Occupation: professor   Tobacco Use    Smoking status: Never    Smokeless tobacco: Never   Vaping Use    Vaping Use: Never used   Substance and Sexual Activity    Alcohol use: No    Drug use: No    Sexual activity: Yes      Social History     Social History Narrative    Not on file        Family History   Problem Relation Age of Onset    Alzheimer's disease Mother          Review of Systems:   HEENT: Patient denies any headaches, vision changes, change in hearing, or tinnitus.  Patient denies any rhinorrhea, epistaxis, sinus pain, mouth or dental problems, sore throat or hoarseness, or dysphagia.  Pulmonary: Patient denies any cough, congestion, SOA, or  wheezing.  Cardiovascular: Patient denies any chest pain, dyspnea, palpitations, weakness, intolerance of exercise, varicosities, swelling of extremities, known murmur.  Gastrointestinal:  Patient denies nausea, vomiting, diarrhea, constipation, loss  of appetite, change in appetite, dysphagia, gas, heartburn, melena, change in bowel habits, use of laxatives or other drugs to alter the function of the gastrointestinal tract.  Genital/Urinary: Patient denies dysuria, change in color of urine, change in frequency of urination, pain with urgency, incontinence, retention, or nocturia.  Musculoskeletal: Complains of right knee pain   neurological: Patient denies dizziness, tremor, ataxia, difficulty in speaking, change in speech, paresthesia, loss of sensation, seizures, syncope, changes in memory.  Endocrine system: Patient denies tremors, palpitations, intolerance of heat or cold, polyuria, polydipsia, polyphagia, diaphoresis, exophthalmos, or goiter.  Psychological: Patient denies thoughts/plans of harming self or other; depression,  insomnia, night terrors, winston, memory loss, disorientation.  Skin: Patient denies any bruising, rashes, discoloration, pruritus, wounds, ulcers, decubiti, changes in the hair or nails.  Hematopoietic: Patient denies history of spontaneous or excessive bleeding, epistaxis, hematuria, melena, fatigue, enlarged or tender lymph nodes, pallor, history of anemia.    Physical Exam: 78 y.o. male  General Appearance:    Alert, cooperative, in no acute distress                   Vitals:    01/28/24 1305 01/28/24 1909 01/28/24 2315 01/29/24 0509   BP: 115/52 120/79 127/56    BP Location: Right arm Left arm Right arm    Patient Position: Lying Lying Lying    Pulse: 83 79 72    Resp: 18 18 18    Temp: 98.6 °F (37 °C) 99 °F (37.2 °C) 98.8 °F (37.1 °C)    TempSrc: Oral Oral Oral    SpO2: 100% 100% 99%    Weight:    78.8 kg (173 lb 11.6 oz)   Height:            Head:  Normocephalic, without obvious  abnormality, atraumatic   Eyes:          Lids and lashes normal, conjunctivae and sclerae normal, no icterus, no pallor, corneas clear, PERRLA   Ears:  Ears appear intact with no abnormalities noted   Throat: No oral lesions, no thrush, oral mucosa moist   Neck: No adenopathy, supple, trachea midline, no thyromegaly, no carotid bruit, no JVD   Back:   No kyphosis present, no scoliosis present, no skin lesions,    erythema or scars, no tenderness to percussion or                   palpation, range of motion normal   Lungs:   Clear to auscultation, respirations regular, even and                unlabored    Heart:  Regular rhythm and normal rate, normal S1 and S2, no         murmur, no gallop, no rub, no click   Chest Wall:  No abnormalities observed   Abdomen:   Normal bowel sounds, no masses, no organomegaly, soft     nontender, nondistended, no guarding, no rebound                tenderness   Rectal:   Deferred   Extremities: Tenderness noted at right knee with attempted range of motion.  Also has right shoulder pain with range of motion   moves all extremities well, no       edema, no cyanosis, no redness   Pulses: Pulses palpable and equal bilaterally   Skin: No bleeding, bruising or rash   Lymph nodes: No palpable adenopathy   Neurologic: Cranial nerves 2 - 12 grossly intact, sensation intact, DTR     present and equal bilaterally           Diagnostic Tests:    No results displayed because visit has over 200 results.      Right Knee xray report and films reviewed showing:IMPRESSION:Limited exam demonstrates a knee joint effusion. There istricompartment osteoarthritis. No evidence for fracture or acute osseousabnormality. There are atherosclerotic calcifications.This report was finalized on 1/28/2024 2:27 PM by Dr. Bobby Gr     No results found.      Assessment: Right knee osteoarthritis    Transient alteration of awareness    Hypertension    DISH (disseminated idiopathic skeletal hyperostosis)     Generalized weakness    Altered mental status    GERD without esophagitis    BPH (benign prostatic hyperplasia)    Leukocytosis    UTI (urinary tract infection)    Dehydration    MARTITA (acute kidney injury)    Hyperglycemia    Altered mental state           Plan:  The patient voiced understanding of the risks, benefits, and alternative forms of treatment that were discussed and the patient consents to proceed with right knee injection however I do not have the necessary medication and supplies for injection so we will obtain that and will likely be later this evening before I can return for knee injection.  If patient is ready for discharge before the injection can be performed he can follow-up in our office for knee evaluation and injection.  Please call [phone]          Date: 1/29/2024    Bryce Salvador MD

## 2024-01-29 NOTE — CASE MANAGEMENT/SOCIAL WORK
Continued Stay Note  [facility]     Patient Name: Anthony Gallegos  MRN: [id number]  Today's Date: 1/29/2024    Admit Date: 1/21/2024    Plan: [facility] pending precert wiht [employer]   Discharge Plan       Row Name 01/29/24 1347       Plan    Plan [facility] pending precert wiht [employer]    Patient/Family in Agreement with Plan yes    Plan Comments Spoke with Maddie/Leola, bed available and ok to start pre-cert. Spoke with PT, notes in shortly. Notified [facility] team to initiate pre-cert with [employer] [facility]. Anticipate transportation. Packet in ccp office.Judith MOORE/CP                   Discharge Codes    No documentation.                 Expected Discharge Date and Time       Expected Discharge Date Expected Discharge Time    Jan 30, 2024               Martha Justice RN

## 2024-01-29 NOTE — PLAN OF CARE
Problem: Adult Inpatient Plan of Care  Goal: Plan of Care Review  Outcome: Ongoing, Progressing  Flowsheets (Taken 1/29/2024 1815)  Plan of Care Reviewed With: patient  Outcome Evaluation: Patient vs stable, engaged in conversation and care, family visited, sat oob to chair with 2 person transfer assist. He has wounds on left heel and right coccyx that wound care requested to see the patient.  Goal: Absence of Hospital-Acquired Illness or Injury  Outcome: Ongoing, Progressing  Intervention: Identify and Manage Fall Risk  Recent Flowsheet Documentation  Taken 1/29/2024 1746 by Leticia Dickey RN  Safety Promotion/Fall Prevention:   nonskid shoes/slippers when out of bed   safety round/check completed  Taken 1/29/2024 1659 by Leticia Dickey RN  Safety Promotion/Fall Prevention:   nonskid shoes/slippers when out of bed   safety round/check completed  Taken 1/29/2024 1415 by Leticia Dickey RN  Safety Promotion/Fall Prevention:   nonskid shoes/slippers when out of bed   safety round/check completed  Taken 1/29/2024 1259 by Leticia Dickey RN  Safety Promotion/Fall Prevention:   nonskid shoes/slippers when out of bed   safety round/check completed  Taken 1/29/2024 1003 by Leticia Dickey RN  Safety Promotion/Fall Prevention:   nonskid shoes/slippers when out of bed   safety round/check completed  Taken 1/29/2024 0947 by Leticia Dickey RN  Safety Promotion/Fall Prevention:   nonskid shoes/slippers when out of bed   safety round/check completed  Taken 1/29/2024 0800 by Leticia Dickey RN  Safety Promotion/Fall Prevention:   nonskid shoes/slippers when out of bed   safety round/check completed  Taken 1/29/2024 0750 by Leticia Dickey RN  Safety Promotion/Fall Prevention:   nonskid shoes/slippers when out of bed   safety round/check completed  Intervention: Prevent Skin Injury  Recent Flowsheet Documentation  Taken 1/29/2024 1659 by Leticia Dickey RN  Body Position:   turned   right  Taken 1/29/2024 1415 by Leticia Dickey RN  Skin Protection:  skin-to-skin areas padded  Taken 1/29/2024 1259 by Leticia Dickey RN  Body Position:   turned   right  Taken 1/29/2024 1003 by Leticia Dickey RN  Body Position:   turned   right  Taken 1/29/2024 0947 by Leticia Dickey RN  Body Position:   turned   right  Skin Protection:   skin-to-skin areas padded   adhesive use limited  Taken 1/29/2024 0800 by Leticia Dickey RN  Body Position:   turned   right  Taken 1/29/2024 0750 by Leticia Dickey RN  Body Position: sitting up in bed  Intervention: Prevent and Manage VTE (Venous Thromboembolism) Risk  Recent Flowsheet Documentation  Taken 1/29/2024 1746 by Leticia Dickey RN  Activity Management:   activity encouraged   up in chair  Taken 1/29/2024 1659 by Leticia Dickey RN  Activity Management:   activity encouraged   up in chair  Taken 1/29/2024 1415 by Leticia Dickey RN  Activity Management:   activity encouraged   up in chair  VTE Prevention/Management: (lovenox given) other (see comments)  Range of Motion:   active ROM (range of motion) encouraged   ROM (range of motion) performed  Taken 1/29/2024 1259 by Leticia Dickey RN  Activity Management: activity encouraged  Taken 1/29/2024 1003 by Leticia Dickey RN  Activity Management: activity encouraged  Taken 1/29/2024 0947 by Leticia Dickey RN  Activity Management: activity encouraged  Range of Motion:   ROM (range of motion) performed   active ROM (range of motion) encouraged  Taken 1/29/2024 0800 by Leticia Dickey RN  Activity Management: activity encouraged  Taken 1/29/2024 0750 by Leticia Dickey RN  Activity Management: activity encouraged  Intervention: Prevent Infection  Recent Flowsheet Documentation  Taken 1/29/2024 1746 by Leticia Dickey RN  Infection Prevention:   single patient room provided   environmental surveillance performed   hand hygiene promoted  Taken 1/29/2024 1659 by Leticia Dickey RN  Infection Prevention:   single patient room provided   environmental surveillance performed   hand hygiene promoted  Taken 1/29/2024 1415 by Leticia Dickey  RN  Infection Prevention:   single patient room provided   environmental surveillance performed   hand hygiene promoted  Taken 1/29/2024 1259 by Letciia Dickey RN  Infection Prevention:   single patient room provided   environmental surveillance performed   hand hygiene promoted  Taken 1/29/2024 1003 by Leticia Dickey RN  Infection Prevention:   single patient room provided   environmental surveillance performed   hand hygiene promoted  Taken 1/29/2024 0947 by Leticia Dickey RN  Infection Prevention:   single patient room provided   environmental surveillance performed   hand hygiene promoted  Taken 1/29/2024 0800 by Leticia Dickey RN  Infection Prevention:   single patient room provided   environmental surveillance performed   hand hygiene promoted  Taken 1/29/2024 0750 by Leticia Dickey RN  Infection Prevention:   single patient room provided   environmental surveillance performed   hand hygiene promoted  Goal: Optimal Comfort and Wellbeing  Outcome: Ongoing, Progressing  Intervention: Provide Person-Centered Care  Recent Flowsheet Documentation  Taken 1/29/2024 1415 by Leticia Dickey RN  Trust Relationship/Rapport:   care explained   choices provided   reassurance provided   thoughts/feelings acknowledged  Taken 1/29/2024 0947 by Leticia Dickey RN  Trust Relationship/Rapport:   care explained   choices provided   reassurance provided   thoughts/feelings acknowledged  Goal: Readiness for Transition of Care  Outcome: Ongoing, Progressing   Goal Outcome Evaluation:  Plan of Care Reviewed With: patient           Outcome Evaluation: Patient vs stable, engaged in conversation and care, family visited, sat oob to chair with 2 person transfer assist. He has wounds on left heel and right coccyx that wound care requested to see the patient.

## 2024-01-29 NOTE — PLAN OF CARE
Goal Outcome Evaluation:  Plan of Care Reviewed With: patient        Progress: improving  Outcome Evaluation: Pt received in bed and agreeable to PT. Pt completed supine to sit with mod A x2 requiring increased time to complete with cues provided for sequencing. Pt demo's a posterior lean while sitting EOB requiring min/mod A and VC to correct. Pt stood and took a few small shuffling steps to chair c RW requiring mod A x 2. Pt fatigues quickly with minimal activity. Pt complete B LE ther-ex 10x at end of session. RN notified on transfer technique. Pt will continue to benefit from skilled PT to address functional deficits.      Anticipated Discharge Disposition (PT): skilled nursing facility

## 2024-01-29 NOTE — PROGRESS NOTES
Dedicated to Hospital Care    [phone]   LOS: 7 days     Name: Anthony Gallegos  Age/Sex: 78 y.o. male  :  1945        PCP: Provider, No Known  Chief Complaint   Patient presents with    Dysuria    Altered Mental Status      Subjective   No acute events overnight.  Patient feels like he is doing about the same today.  No chest pain or shortness of breath.  Still having some right knee pain.  Pending placement to rehab at this time.    cefTRIAXone, 2,000 mg, Intravenous, Q24H  enoxaparin, 30 mg, Subcutaneous, Q24H  pantoprazole, 40 mg, Oral, Q AM  potassium & sodium phosphates, 2 packet, Oral, Once  senna-docusate sodium, 2 tablet, Oral, BID  tamsulosin, 0.4 mg, Oral, Nightly           Objective   Vital Signs  Temp:  [98 °F (36.7 °C)-99 °F (37.2 °C)] 98.4 °F (36.9 °C)  Heart Rate:  [72-85] 85  Resp:  [18] 18  BP: (115-130)/(52-79) 117/56  Body mass index is 21.71 kg/m².    Intake/Output Summary (Last 24 hours) at 2024 1258  Last data filed at 2024 0947  Gross per 24 hour   Intake 600 ml   Output 3800 ml   Net -3200 ml     General: Alert, no acute distress.  Lying in bed.  Fully oriented.  Chronically ill and frail appearing.  ENT: No conjunctival injection or scleral icterus. Moist mucous membranes.   Neuro: Eyes open and moving in all directions, strength normal in all extremities, no focal deficits.   Lungs: Clear to auscultation bilaterally. No wheeze or crackles. No distress.   Heart: RRR, no murmurs. No edema.  Abdomen: Soft, non-tender, non-distended. Normal bowel sounds.   Ext: Right knee with swelling and warmth.  Tender to palpation.    Skin: No rashes or lesions. IV site without swelling or erythema.     Results Review:       I reviewed the patient's new clinical results.  Results from last 7 days   Lab Units 24  0437 24  0443 24  0504 24  0514 24  0725 24  0326 24  0458   WBC 10*3/mm3 8.35 8.74 12.10* 15.73* 23.98* 15.86* 21.70*   HEMOGLOBIN  g/dL 10.2* 9.8* 9.3* 10.3* 10.9* 10.1* 11.8*   PLATELETS 10*3/mm3 364 346 301 283 253 218 262     Results from last 7 days   Lab Units 01/29/24  0437 01/28/24  0443 01/27/24  1738 01/27/24  0504 01/26/24  0514 01/25/24  0725 01/24/24  0326 01/23/24  0418   SODIUM mmol/L 135* 135*  --  134* 137 139 138 139   POTASSIUM mmol/L 4.0 3.9 4.0 3.5 3.6 3.7 4.0 4.7   CHLORIDE mmol/L 102 105  --  104 107 109* 107 105   CO2 mmol/L 24.6 22.0  --  22.5 19.7* 20.0* 21.9* 20.6*   BUN mg/dL 13 16  --  22 32* 35* 41* 45*   CREATININE mg/dL 0.66* 0.79  --  0.98 1.50* 1.69* 1.64* 2.15*   CALCIUM mg/dL 7.8* 7.6*  --  7.3* 7.5* 7.4* 7.1* 8.0*   MAGNESIUM mg/dL 1.8 2.2  --  2.2 2.3 2.2 2.4 2.5*   PHOSPHORUS mg/dL 2.5  --  2.9 1.9* 1.6* 2.6 2.6  --    Estimated Creatinine Clearance: 101.2 mL/min (A) (by C-G formula based on SCr of 0.66 mg/dL (L)).      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       PLAN  Mr. Gallegos is a 78 y.o. male with a history of DISH, hypertension, ankylosing spondylitis and impaired mobility who presented to [facility] initially complaining of confusion and was found to have urinary tract infection and admitted to monitored unit.    -Urinary tract infection: CT scan of the abdomen and pelvis yesterday showed continued bladder distention and some evidence of possible pyelonephritis with  hydronephrosis and stranding.  Urine culture now growing Klebsiella.  Has been afebrile for greater than 24 hours.  Continue ceftriaxone 2 g every 24 hours x 10 days to cover for pyelonephritis.  Can transition to oral antibiotics at discharge.    -Right knee pain: Patient complaining of right knee pain.  Swelling and tenderness on exam.  Uric acid negative.  Normal WBC.  XR with possible effusion, no fracture or dislocation.  Orthopedic surgery has been consulted.  Planning to inject knee later today.    -Urinary retention: Mariscal catheter was placed and urology was consulted.  Urine continues to be blood-tinged.  If the Mariscal continues to clog, may need to place larger urinary catheter.  Patient going to be discharged with Mariscal in place.  Urology has now signed off.    -Dysphagia: SLP evaluated.  Recommending soft, ground meats with thin liquids.  Meds whole in puréed.  No straws.  Continuing to follow.    -Leukocytosis: WBC has now normalized.  No obvious new source of infection.  Clinically patient appears to be improving, has been afebrile for over 24 hours.  Blood cultures remain no growth to date.    -Acute renal failure, electrolyte derangements: Creatinine normal today.  Baseline is normal.  Corrected calcium has been normal.  Avoid nephrotoxic agents including NSAIDs and contrast dyes.  Electrolyte replacement protocol.  Monitor with daily BMP.  Nephrology consulted and following.  Appreciate their assistance.    -Anemia: Hgb improved to 10.2 today, baseline appears around 12.  No signs of active bleeding. Monitor with daily CBC and transfuse for Hgb less than 7.    -Lovenox for DVT prophylaxis  -Full code    Patient is clinically doing well.  Pre certification process for rehab was started by case management, and referrals for facilities were sent.  They are continuing to follow.  Patient pending placement at this time.     Mireya Basilio MD  [facility]

## 2024-01-29 NOTE — PLAN OF CARE
Goal Outcome Evaluation:  Plan of Care Reviewed With: patient        Progress: improving  Outcome Evaluation: Pt. A&Ox4.  Mariscal Catheter draining with no clots and has put out 3250 of yellow urine.  Awaiting ortho consult regarding his right knee pain.  VSS.  WCTM for the rest of the shift

## 2024-01-29 NOTE — THERAPY TREATMENT NOTE
Patient Name: Anthony Gallegos  : 1945    MRN: [id number]                              Today's Date: 2024       Admit Date: 2024    Visit Dx:     ICD-10-CM ICD-9-CM   1. Altered mental status, unspecified altered mental status type  R41.82 780.97   2. Leukocytosis, unspecified type  D72.829 288.60     Patient Active Problem List   Diagnosis    Ankylosing spondylitis of cervical region    Recent unexplained weight loss    Abnormal serum lipase level    Abnormal serum level of amylase    Hypertension    Boil    Immobility    Fall from bed    Tetrahydrocannabinol (THC) use disorder, mild, abuse    Generalized pain        Grief    DISH (disseminated idiopathic skeletal hyperostosis)    Generalized weakness    Severe malnutrition    Altered mental status    Transient alteration of awareness    GERD without esophagitis    BPH (benign prostatic hyperplasia)    Leukocytosis    UTI (urinary tract infection)    Dehydration    MARTITA (acute kidney injury)    Hyperglycemia    Altered mental state     Past Medical History:   Diagnosis Date    Abscess of scrotum     Arthritis     AS (ankylosing spondylitis)     Hypertension     Tetrahydrocannabinol (THC) use disorder, mild, abuse 2023    Uveitis      Past Surgical History:   Procedure Laterality Date    COLONOSCOPY      ROTATOR CUFF REPAIR Right     TOTAL HIP ARTHROPLASTY Left       General Information       Row Name 24 1433          Physical Therapy Time and Intention    Document Type therapy note (daily note)  -CS     Mode of Treatment individual therapy;physical therapy  -CS       Row Name 24 1433          General Information    Patient Profile Reviewed yes  -CS     Existing Precautions/Restrictions fall  -CS       Row Name 24 1433          Cognition    Orientation Status (Cognition) oriented x 3  -CS       Row Name 24 1433          Safety Issues, Functional Mobility    Safety Issues Affecting Function (Mobility)  judgment;sequencing abilities;safety precautions follow-through/compliance  -CS     Impairments Affecting Function (Mobility) balance;coordination;endurance/activity tolerance;strength;range of motion (ROM)  -CS               User Key  (r) = Recorded By, (t) = Taken By, (c) = Cosigned By      Initials Name Provider Type    CS Rufina Harris PT Physical Therapist                   Mobility       Row Name 01/29/24 1434          Bed Mobility    Bed Mobility supine-sit  -CS     Supine-Sit [place] (Bed Mobility) moderate assist (50% patient effort);2 person assist;verbal cues;nonverbal cues (demo/gesture)  -CS     Assistive Device (Bed Mobility) head of bed elevated  -CS     Comment, (Bed Mobility) cues for sequencing + increased time to complete; UIC at end of session  -CS       Row Name 01/29/24 1434          Bed-Chair Transfer    Bed-Chair [place] (Transfers) moderate assist (50% patient effort);2 person assist;verbal cues  -CS     Assistive Device (Bed-Chair Transfers) walker, front-wheeled  -CS     Comment, (Bed-Chair Transfer) cues for sequencing  -CS       Row Name 01/29/24 1434          Sit-Stand Transfer    Sit-Stand [place] (Transfers) moderate assist (50% patient effort);2 person assist;verbal cues;nonverbal cues (demo/gesture)  -CS     Assistive Device (Sit-Stand Transfers) walker, front-wheeled  -CS     Comment, (Sit-Stand Transfer) bed height elevated to assist; posterior lean when initially standing  -       Row Name 01/29/24 1434          Gait/Stairs (Locomotion)    [place] Level (Gait) moderate assist (50% patient effort);2 person assist;verbal cues  -CS     Assistive Device (Gait) walker, front-wheeled  -CS     Distance in Feet (Gait) 4' (bed to chair)  -CS     Deviations/Abnormal Patterns (Gait) ranjit decreased;festinating/shuffling;gait speed decreased;base of support, narrow;stride length decreased  -CS     Bilateral Gait Deviations forward flexed posture;heel strike  decreased  -CS     Comment, (Gait/Stairs) slow shuffling gait; cues for sequencing and to correct gait mechanics  -CS               User Key  (r) = Recorded By, (t) = Taken By, (c) = Cosigned By      Initials Name Provider Type    CS Rufina Harris PT Physical Therapist                   Obj/Interventions       Row Name 01/29/24 1435          Motor Skills    Therapeutic Exercise other (see comments)  5-10 reps B LE LAQ & AP  -CS       Row Name 01/29/24 1435          Balance    Balance Assessment sitting static balance;sitting dynamic balance;standing static balance;standing dynamic balance  -CS     Static Sitting Balance minimal assist  -CS     Dynamic Sitting Balance moderate assist  -CS     Position, Sitting Balance unsupported;sitting edge of bed  -CS     Static Standing Balance minimal assist;2-person assist  -CS     Dynamic Standing Balance moderate assist;2-person assist  -CS     Position/Device Used, Standing Balance supported;walker, front-wheeled  -CS     Comment, Balance posterior lean while sitting EOB and standing  -CS               User Key  (r) = Recorded By, (t) = Taken By, (c) = Cosigned By      Initials Name Provider Type    Rufina Her, PT Physical Therapist                   Goals/Plan    No documentation.                  Clinical Impression       Row Name 01/29/24 1436          Pain    Pretreatment Pain Rating 5/10  -CS     Posttreatment Pain Rating 5/10  -CS     Pain Location - Side/Orientation Right  -CS     Pain Location - knee  -CS     Pain Intervention(s) Ambulation/increased activity;Rest;Repositioned  -CS       Row Name 01/29/24 1436          Plan of Care Review    Plan of Care Reviewed With patient  -CS     Progress improving  -CS     Outcome Evaluation Pt received in bed and agreeable to PT. Pt completed supine to sit with mod A x2 requiring increased time to complete with cues provided for sequencing. Pt demo's a posterior lean while sitting EOB requiring min/mod A and VC to  correct. Pt stood and took a few small shuffling steps to chair c RW requiring mod A x 2. Pt fatigues quickly with minimal activity. Pt complete B LE ther-ex 10x at end of session. RN notified on transfer technique. Pt will continue to benefit from skilled PT to address functional deficits.  -CS       Row Name 01/29/24 1436          Therapy Assessment/Plan (PT)    Criteria for Skilled Interventions Met (PT) yes;meets criteria  -CS     Therapy Frequency (PT) 6 times/wk  -CS       Row Name 01/29/24 1436          Positioning and Restraints    Pre-Treatment Position in bed  -CS     Post Treatment Position chair  -CS     In Chair notified nsg;sitting;call light within reach;encouraged to call for assist;exit alarm on  -CS               User Key  (r) = Recorded By, (t) = Taken By, (c) = Cosigned By      Initials Name Provider Type    Rufina Her, PT Physical Therapist                   Outcome Measures       Row Name 01/29/24 1438          How much help from another person do you currently need...    Turning from your back to your side while in flat bed without using bedrails? 2  -CS     Moving from lying on back to sitting on the side of a flat bed without bedrails? 2  -CS     Moving to and from a bed to a chair (including a wheelchair)? 2  -CS     Standing up from a chair using your arms (e.g., wheelchair, bedside chair)? 2  -CS     Climbing 3-5 steps with a railing? 1  -CS     To walk in hospital room? 2  -CS     AM-PAC 6 Clicks Score (PT) 11  -CS     Highest Level of Mobility Goal 4 --> Transfer to chair/commode  -CS       Row Name 01/29/24 1438          Functional Assessment    Outcome Measure Options AM-PAC 6 Clicks Basic Mobility (PT)  -CS               User Key  (r) = Recorded By, (t) = Taken By, (c) = Cosigned By      Initials Name Provider Type    Rufina Her PT Physical Therapist                                 Physical Therapy Education       Title: PT OT SLP Therapies (Done)       Topic:  Physical Therapy (Done)       Point: Mobility training (Done)       Learning Progress Summary             Patient Acceptance, E,TB, VU,DU,NR by LYN at 1/29/2024 1438    Acceptance, E, VU by CECI at 1/28/2024 0410    Acceptance, E,TB,D, VU,NR by AMELIA at 1/27/2024 1716    Acceptance, E, VU by CECI at 1/27/2024 0409    Acceptance, E, NR by LOUIS at 1/26/2024 1207    Acceptance, E, VU by CECI at 1/26/2024 0147    Acceptance, E, VU,NR by LOUIS at 1/25/2024 1151    Acceptance, E, VU by EM at 1/24/2024 1404    Acceptance, E,D, VU,NR by AMELIA at 1/23/2024 1744    Acceptance, E, VU,NR by CLARISSA at 1/22/2024 1427                         Point: Home exercise program (Done)       Learning Progress Summary             Patient Acceptance, E,TB, VU,DU,NR by LYN at 1/29/2024 1438    Acceptance, E, VU by CECI at 1/28/2024 0410    Acceptance, E,TB,D, VU,NR by AMELIA at 1/27/2024 1716    Acceptance, E, VU by CECI at 1/27/2024 0409    Acceptance, E, NR by LOUIS at 1/26/2024 1207    Acceptance, E, VU by CECI at 1/26/2024 0147    Acceptance, E, VU,NR by LOUIS at 1/25/2024 1151    Acceptance, E, VU by EM at 1/24/2024 1404    Acceptance, E,D, VU,NR by AMELIA at 1/23/2024 1744    Acceptance, E, VU,NR by CLARISSA at 1/22/2024 1427                         Point: Body mechanics (Done)       Learning Progress Summary             Patient Acceptance, E,TB, VU,DU,NR by LYN at 1/29/2024 1438    Acceptance, E, VU by CECI at 1/28/2024 0410    Acceptance, E,TB,D, VU,NR by AMELIA at 1/27/2024 1716    Acceptance, E, VU by CECI at 1/27/2024 0409    Acceptance, E, NR by LOUIS at 1/26/2024 1207    Acceptance, E, VU by CECI at 1/26/2024 0147    Acceptance, E, VU,NR by LOUIS at 1/25/2024 1151    Acceptance, E,D, VU,NR by AMELIA at 1/23/2024 1744    Acceptance, E, VU,NR by CLARISSA at 1/22/2024 1427                         Point: Precautions (Done)       Learning Progress Summary             Patient Acceptance, E,TB, VU,DU,NR by LYN at 1/29/2024 1438    Acceptance, E, VU by CECI at 1/28/2024 0410    Acceptance, E,TB,D, VU,NR by AMELIA at  1/27/2024 1716    Acceptance, E, VU by BB at 1/27/2024 0409    Acceptance, E, NR by DJ at 1/26/2024 1207    Acceptance, E, VU by BB at 1/26/2024 0147    Acceptance, E, VU,NR by DJ at 1/25/2024 1151    Acceptance, E,D, VU,NR by AMELIA at 1/23/2024 1744    Acceptance, E, VU,NR by DB at 1/22/2024 1427                                         User Key       Initials Effective Dates Name Provider Type Discipline    EM 06/16/21 -  Cierra Valdivia, PT Physical Therapist PT    JM 03/07/18 -  Rosa Portillo PTA Physical Therapist Assistant PT    DB 06/16/21 -  Felipa Weathers, PT Physical Therapist PT    DJ 10/25/19 -  Roxi Schofield, PT Physical Therapist PT    CS 09/22/22 -  Rufina Harris, PT Physical Therapist PT    BB 11/16/23 -  Talisha Patel, RN Registered Nurse Nurse                  PT Recommendation and Plan     Plan of Care Reviewed With: patient  Progress: improving  Outcome Evaluation: Pt received in bed and agreeable to PT. Pt completed supine to sit with mod A x2 requiring increased time to complete with cues provided for sequencing. Pt demo's a posterior lean while sitting EOB requiring min/mod A and VC to correct. Pt stood and took a few small shuffling steps to chair c RW requiring mod A x 2. Pt fatigues quickly with minimal activity. Pt complete B LE ther-ex 10x at end of session. RN notified on transfer technique. Pt will continue to benefit from skilled PT to address functional deficits.     Time Calculation:         PT Charges       Row Name 01/29/24 1439             Time Calculation    Start Time 1322  -CS      Stop Time 1346  -CS      Time Calculation (min) 24 min  -CS      PT Received On 01/29/24  -CS      PT - Next Appointment 01/30/24  -CS         Time Calculation- PT    Total Timed Code Minutes- PT 23 minute(s)  -CS         Timed Charges    61232 - PT Therapeutic Activity Minutes 23  -CS         Total Minutes    Timed Charges Total Minutes 23  -CS       Total Minutes 23  -CS                 User Key  (r) = Recorded By, (t) = Taken By, (c) = Cosigned By      Initials Name Provider Type    CS Rufina Harris, PT Physical Therapist                  Therapy Charges for Today       Code Description Service Date Service Provider Modifiers Qty    [id number]  PT THERAPEUTIC ACT EA 15 MIN 1/29/2024 Rufina Harris PT GP 2            PT G-Codes  Outcome Measure Options: AM-PAC 6 Clicks Basic Mobility (PT)  AM-PAC 6 Clicks Score (PT): 11  AM-PAC 6 Clicks Score (OT): 11  Modified Grayson Scale: 4 - Moderately severe disability.  Unable to walk without assistance, and unable to attend to own bodily needs without assistance.  PT Discharge Summary  Anticipated Discharge Disposition (PT): skilled nursing facility    Rufina Harris PT  1/29/2024

## 2024-01-29 NOTE — SIGNIFICANT NOTE
01/29/24 1704   Post Acute Pre-Cert Documentation   Request Submitted by Facility - Type: Hospital   Post-Acute Authorization Type Submitted: SNF   Date Post Acute Pre-Cert Inititated per Facility 01/29/24   Accepting Facility [facility] Discharge Date Requested 01/30/24   All Clinicals Submitted? Yes   Had Accepting Facility at Time of Submission Yes   Response Communicated to:    Authorization Number: PENDING [id number]   Post Acute Pre-Cert Initiated Comment ALIREZA REECE

## 2024-01-29 NOTE — PROGRESS NOTES
[facility] Progress Note      Patient Name: Anthony Gallegos  : 1945  MRN: [id number]  Primary Care Physician:  Provider, No Known  Date of admission: 2024    Subjective     Interval History:   Follow-up acute kidney injury.    Patient is feeling much better, denies any chest pain or shortness of air, he has 4 Mariscal catheter anchored and he has good urine output.      Review of Systems:   As noted above    Objective     Vitals:   Temp:  [98 °F (36.7 °C)-99 °F (37.2 °C)] 98.4 °F (36.9 °C)  Heart Rate:  [72-85] 85  Resp:  [18] 18  BP: (115-130)/(52-79) 117/56    Intake/Output Summary (Last 24 hours) at 2024 1223  Last data filed at 2024 0947  Gross per 24 hour   Intake 600 ml   Output 3800 ml   Net -3200 ml       Physical Exam:    General Appearance: alert, awake, chronically ill, no acute distress  Skin: warm and dry  HEENT: Edentulous.    Neck: supple, no JVD  Lungs: Clear to auscultation, unlabored breathing effort.  Heart: RRR, no rub  Abdomen: soft, he had significant tenderness to palpation below his umbilicus, nondistended.  Normoactive bowel  : coude catheter, bloody urine noted in the bag.  Extremities: No edema.  Neuro: Moving all extremities    Scheduled Meds:     cefTRIAXone, 2,000 mg, Intravenous, Q24H  enoxaparin, 30 mg, Subcutaneous, Q24H  pantoprazole, 40 mg, Oral, Q AM  potassium & sodium phosphates, 2 packet, Oral, Once  senna-docusate sodium, 2 tablet, Oral, BID  tamsulosin, 0.4 mg, Oral, Nightly      IV Meds:          Results Reviewed:   I have personally reviewed the results from the time of this admission to 2024 12:23 EST     Results from last 7 days   Lab Units 24  0437 24  0443 24  1738 24  0504   SODIUM mmol/L 135* 135*  --  134*   POTASSIUM mmol/L 4.0 3.9 4.0 3.5   CHLORIDE mmol/L 102 105  --  104   CO2 mmol/L 24.6 22.0  --  22.5   BUN mg/dL 13 16  --  22   CREATININE mg/dL 0.66* 0.79  --  0.98   CALCIUM mg/dL  7.8* 7.6*  --  7.3*   GLUCOSE mg/dL 81 89  --  89       Estimated Creatinine Clearance: 101.2 mL/min (A) (by C-G formula based on SCr of 0.66 mg/dL (L)).    Results from last 7 days   Lab Units 01/29/24  0437 01/28/24  0443 01/27/24  1738 01/27/24  0504   MAGNESIUM mg/dL 1.8 2.2  --  2.2   PHOSPHORUS mg/dL 2.5  --  2.9 1.9*       Results from last 7 days   Lab Units 01/28/24  0443 01/27/24  0504 01/24/24  0326 01/23/24  0418   URIC ACID mg/dL 4.2 4.5 5.6 5.9       Results from last 7 days   Lab Units 01/29/24  0437 01/28/24  0443 01/27/24  0504 01/26/24  0514 01/25/24  0725   WBC 10*3/mm3 8.35 8.74 12.10* 15.73* 23.98*   HEMOGLOBIN g/dL 10.2* 9.8* 9.3* 10.3* 10.9*   PLATELETS 10*3/mm3 364 346 301 283 253             Assessment / Plan     ASSESSMENT:  Acute kidney injury.  Nonoliguric.  Multifactorial including obstructive uropathy with requirement of coude catheter placement.  Taking nonsteroidals prior to admission.  Creatinine 0.66 today.  Has nephrotic range proteinuria noted on his protein to creatinine ratio and 24-hour urine collection. SUMAYA is pending. US no hydro.  He has a proteinuria but has slowly been improving  2.  Altered mental status resolved  3.  Ankylosing spondylitis, uveitis.  On methotrexate as an outpatient..  4.  Klebsiella urinary tract infection.  Treated  5.  Hypertension.  Reasonably controlled.  6.  BPH with urinary retention.  History of retention in the past.  Coudé catheter placed and patient on Flomax.  7. Hypokalemia and hypophosphatemia.,  Resolved    PLAN:  Continue the same treatment  He needs outpatient follow-up to determine if any further evaluation for the proteinuria since he has 14 to creatinine ratio on admission up to 8.4 g/g and recheck on 1/26/2024 showed decreased down to 3.78 at that time his creatinine was improving    I reviewed the chart and other providers notes and reviewed lab  The patient will be seen in our office after discharge in 2 to 3 weeks for  reevaluation  I will sign off today but will be happy to see again if needed while in the house  Copied text in this note has been reviewed and is accurate as of 01/29/24.       Thank you for involving us in the care of Anthony Gallegos.  Please feel free to call with any questions.    Thompson Novoa MD  01/29/24  12:23 [facility]    [facility]  [phone]

## 2024-01-30 PROBLEM — R41.82 ALTERED MENTAL STATE: Status: RESOLVED | Noted: 2024-01-22 | Resolved: 2024-01-30

## 2024-01-30 PROBLEM — D72.829 LEUKOCYTOSIS: Status: RESOLVED | Noted: 2024-01-22 | Resolved: 2024-01-30

## 2024-01-30 PROBLEM — N17.9 AKI (ACUTE KIDNEY INJURY): Status: RESOLVED | Noted: 2024-01-22 | Resolved: 2024-01-30

## 2024-01-30 LAB
BASOPHILS # BLD AUTO: 0.08 10*3/MM3 (ref 0–0.2)
BASOPHILS NFR BLD AUTO: 0.7 % (ref 0–1.5)
DEPRECATED RDW RBC AUTO: 43.8 FL (ref 37–54)
EOSINOPHIL # BLD AUTO: 0.18 10*3/MM3 (ref 0–0.4)
EOSINOPHIL NFR BLD AUTO: 1.7 % (ref 0.3–6.2)
ERYTHROCYTE [DISTWIDTH] IN BLOOD BY AUTOMATED COUNT: 13 % (ref 12.3–15.4)
HCT VFR BLD AUTO: 30.6 % (ref 37.5–51)
HGB BLD-MCNC: 10.3 G/DL (ref 13–17.7)
IMM GRANULOCYTES # BLD AUTO: 0.1 10*3/MM3 (ref 0–0.05)
IMM GRANULOCYTES NFR BLD AUTO: 0.9 % (ref 0–0.5)
LYMPHOCYTES # BLD AUTO: 1 10*3/MM3 (ref 0.7–3.1)
LYMPHOCYTES NFR BLD AUTO: 9.4 % (ref 19.6–45.3)
MAGNESIUM SERPL-MCNC: 1.9 MG/DL (ref 1.6–2.4)
MCH RBC QN AUTO: 30.9 PG (ref 26.6–33)
MCHC RBC AUTO-ENTMCNC: 33.7 G/DL (ref 31.5–35.7)
MCV RBC AUTO: 91.9 FL (ref 79–97)
MONOCYTES # BLD AUTO: 1.37 10*3/MM3 (ref 0.1–0.9)
MONOCYTES NFR BLD AUTO: 12.8 % (ref 5–12)
NEUTROPHILS NFR BLD AUTO: 7.94 10*3/MM3 (ref 1.7–7)
NEUTROPHILS NFR BLD AUTO: 74.5 % (ref 42.7–76)
NRBC BLD AUTO-RTO: 0 /100 WBC (ref 0–0.2)
PLATELET # BLD AUTO: 432 10*3/MM3 (ref 140–450)
PMV BLD AUTO: 9.3 FL (ref 6–12)
RBC # BLD AUTO: 3.33 10*6/MM3 (ref 4.14–5.8)
WBC NRBC COR # BLD AUTO: 10.67 10*3/MM3 (ref 3.4–10.8)

## 2024-01-30 PROCEDURE — 83735 ASSAY OF MAGNESIUM: CPT | Performed by: STUDENT IN AN ORGANIZED HEALTH CARE EDUCATION/TRAINING PROGRAM

## 2024-01-30 PROCEDURE — 97530 THERAPEUTIC ACTIVITIES: CPT

## 2024-01-30 PROCEDURE — 85025 COMPLETE CBC W/AUTO DIFF WBC: CPT | Performed by: STUDENT IN AN ORGANIZED HEALTH CARE EDUCATION/TRAINING PROGRAM

## 2024-01-30 PROCEDURE — 3E0U3BZ INTRODUCTION OF ANESTHETIC AGENT INTO JOINTS, PERCUTANEOUS APPROACH: ICD-10-PCS | Performed by: ORTHOPAEDIC SURGERY

## 2024-01-30 PROCEDURE — 25010000002 CEFTRIAXONE PER 250 MG: Performed by: STUDENT IN AN ORGANIZED HEALTH CARE EDUCATION/TRAINING PROGRAM

## 2024-01-30 PROCEDURE — 20610 DRAIN/INJ JOINT/BURSA W/O US: CPT | Performed by: ORTHOPAEDIC SURGERY

## 2024-01-30 PROCEDURE — 25010000002 ENOXAPARIN PER 10 MG: Performed by: HOSPITALIST

## 2024-01-30 PROCEDURE — 97535 SELF CARE MNGMENT TRAINING: CPT

## 2024-01-30 PROCEDURE — 97116 GAIT TRAINING THERAPY: CPT

## 2024-01-30 PROCEDURE — 3E0U33Z INTRODUCTION OF ANTI-INFLAMMATORY INTO JOINTS, PERCUTANEOUS APPROACH: ICD-10-PCS | Performed by: ORTHOPAEDIC SURGERY

## 2024-01-30 RX ORDER — LANOLIN ALCOHOL/MO/W.PET/CERES
3 CREAM (GRAM) TOPICAL NIGHTLY PRN
Start: 2024-01-30

## 2024-01-30 RX ORDER — AMOXICILLIN AND CLAVULANATE POTASSIUM 875; 125 MG/1; MG/1
1 TABLET, FILM COATED ORAL 2 TIMES DAILY
Qty: 4 TABLET | Refills: 0 | Status: SHIPPED | OUTPATIENT
Start: 2024-01-30 | End: 2024-02-01

## 2024-01-30 RX ORDER — PETROLATUM 420 MG/G
1 OINTMENT TOPICAL
Status: DISCONTINUED | OUTPATIENT
Start: 2024-01-30 | End: 2024-02-01 | Stop reason: HOSPADM

## 2024-01-30 RX ORDER — ACETAMINOPHEN 325 MG/1
650 TABLET ORAL EVERY 4 HOURS PRN
Start: 2024-01-30

## 2024-01-30 RX ADMIN — SENNOSIDES AND DOCUSATE SODIUM 2 TABLET: 50; 8.6 TABLET ORAL at 08:20

## 2024-01-30 RX ADMIN — ENOXAPARIN SODIUM 30 MG: 100 INJECTION SUBCUTANEOUS at 08:19

## 2024-01-30 RX ADMIN — Medication 10 ML: at 08:20

## 2024-01-30 RX ADMIN — PETROLATUM 1 APPLICATION: 420 OINTMENT TOPICAL at 19:00

## 2024-01-30 RX ADMIN — PANTOPRAZOLE SODIUM 40 MG: 40 TABLET, DELAYED RELEASE ORAL at 06:55

## 2024-01-30 RX ADMIN — CEFTRIAXONE 2000 MG: 2 INJECTION, POWDER, FOR SOLUTION INTRAMUSCULAR; INTRAVENOUS at 21:09

## 2024-01-30 RX ADMIN — TAMSULOSIN HYDROCHLORIDE 0.4 MG: 0.4 CAPSULE ORAL at 21:09

## 2024-01-30 NOTE — PLAN OF CARE
Goal Outcome Evaluation:  Plan of Care Reviewed With: patient        Progress: improving  Outcome Evaluation: Pt received in bed and agreeable to PT. Pt required mod A to reach sitting EOB. Posterior lean initially but able to correct with overall good sitting balance. Pt stood 2x from EOB requiring min/mod A x 2. Pt took a few shuffling side-steps to HOB. Pt fatigues quickly requiring 1 seated rest break between bouts. Pt returned to bed with all needs in reach. Pt plans to D/C to rehab hopefully today.      Anticipated Discharge Disposition (PT): skilled nursing facility

## 2024-01-30 NOTE — THERAPY TREATMENT NOTE
Patient Name: Anthony Gallegos  : 1945    MRN: [id number]                              Today's Date: 2024       Admit Date: 2024    Visit Dx:     ICD-10-CM ICD-9-CM   1. Altered mental status, unspecified altered mental status type  R41.82 780.97   2. Leukocytosis, unspecified type  D72.829 288.60     Patient Active Problem List   Diagnosis    Ankylosing spondylitis of cervical region    Recent unexplained weight loss    Abnormal serum lipase level    Abnormal serum level of amylase    Hypertension    Boil    Immobility    Fall from bed    Tetrahydrocannabinol (THC) use disorder, mild, abuse    Generalized pain        Grief    DISH (disseminated idiopathic skeletal hyperostosis)    Generalized weakness    Severe malnutrition    Altered mental status    Transient alteration of awareness    GERD without esophagitis    BPH (benign prostatic hyperplasia)    UTI (urinary tract infection)    Dehydration    Hyperglycemia     Past Medical History:   Diagnosis Date    Abscess of scrotum     MARTITA (acute kidney injury)     Altered mental state     Arthritis     AS (ankylosing spondylitis)     Hypertension     Leukocytosis     Tetrahydrocannabinol (THC) use disorder, mild, abuse 2023    Uveitis      Past Surgical History:   Procedure Laterality Date    COLONOSCOPY      ROTATOR CUFF REPAIR Right     TOTAL HIP ARTHROPLASTY Left       General Information       Row Name 24 1450          Physical Therapy Time and Intention    Document Type therapy note (daily note)  -CS     Mode of Treatment co-treatment;physical therapy;occupational therapy  -CS       Row Name 24 1450          General Information    Patient Profile Reviewed yes  -CS     Existing Precautions/Restrictions fall  -CS       Row Name 24 1450          Cognition    Orientation Status (Cognition) oriented x 3  -CS       Row Name 24 1450          Safety Issues, Functional Mobility    Impairments Affecting Function  (Mobility) balance;coordination;endurance/activity tolerance;strength;range of motion (ROM)  -CS     Comment, Safety Issues/Impairments (Mobility) Co treatment medically appropriate and necessary due to patient acuity level, activity tolerance and safety of patient and staff. Treatment is focusing on progression of care and goals established in the POC.  -CS               User Key  (r) = Recorded By, (t) = Taken By, (c) = Cosigned By      Initials Name Provider Type    CS Rufina Harris PT Physical Therapist                   Mobility       Row Name 01/30/24 1450          Bed Mobility    Bed Mobility supine-sit;sit-supine  -CS     Supine-Sit [place] (Bed Mobility) moderate assist (50% patient effort);2 person assist;verbal cues;nonverbal cues (demo/gesture)  -CS     Sit-Supine [place] (Bed Mobility) moderate assist (50% patient effort);maximum assist (25% patient effort);2 person assist;nonverbal cues (demo/gesture)  -CS     Assistive Device (Bed Mobility) head of bed elevated  -CS       Row Name 01/30/24 1450          Sit-Stand Transfer    Sit-Stand [place] (Transfers) minimum assist (75% patient effort);moderate assist (50% patient effort);2 person assist;verbal cues;nonverbal cues (demo/gesture)  -CS     Assistive Device (Sit-Stand Transfers) walker, front-wheeled  -CS     Comment, (Sit-Stand Transfer) 2x from EOB; initial posterior lean but able to correct once fully upright  -CS       Row Name 01/30/24 1450          Gait/Stairs (Locomotion)    [place] Level (Gait) minimum assist (75% patient effort);moderate assist (50% patient effort);2 person assist;verbal cues;nonverbal cues (demo/gesture)  -CS     Assistive Device (Gait) walker, front-wheeled  -CS     Distance in Feet (Gait) 4' side-steps to HOB  -CS     Deviations/Abnormal Patterns (Gait) ranjit decreased;festinating/shuffling;gait speed decreased;base of support, narrow;stride length decreased  -CS     Bilateral Gait Deviations  forward flexed posture;heel strike decreased  -CS     Comment, (Gait/Stairs) slow shuffling gait; cues for sequencing + manual assist to manage RW  -CS               User Key  (r) = Recorded By, (t) = Taken By, (c) = Cosigned By      Initials Name Provider Type    CS Rufina Harris, PT Physical Therapist                   Obj/Interventions       Row Name 01/30/24 1451          Balance    Balance Assessment sitting static balance;sitting dynamic balance;standing static balance;standing dynamic balance  -CS     Static Sitting Balance standby assist  -CS     Dynamic Sitting Balance contact guard;minimal assist  -CS     Position, Sitting Balance unsupported;sitting edge of bed  -CS     Static Standing Balance minimal assist;2-person assist  -CS     Dynamic Standing Balance minimal assist;moderate assist;2-person assist  -CS     Position/Device Used, Standing Balance supported;walker, front-wheeled  -CS               User Key  (r) = Recorded By, (t) = Taken By, (c) = Cosigned By      Initials Name Provider Type    Rufina Her, PT Physical Therapist                   Goals/Plan       Row Name 01/30/24 1454          Bed Mobility Goal 1 (PT)    Activity/Assistive Device (Bed Mobility Goal 1, PT) bed mobility activities, all  -CS     [place] Level/Cues Needed (Bed Mobility Goal 1, PT) minimum assist (75% or more patient effort)  -CS     Time Frame (Bed Mobility Goal 1, PT) 2 weeks  -CS     Progress/Outcomes (Bed Mobility Goal 1, PT) goal revised this date  -       Row Name 01/30/24 1454          Transfer Goal 1 (PT)    Activity/Assistive Device (Transfer Goal 1, PT) transfers, all  -CS     [place] Level/Cues Needed (Transfer Goal 1, PT) minimum assist (75% or more patient effort)  -CS     Time Frame (Transfer Goal 1, PT) 2 weeks  -CS     Progress/Outcome (Transfer Goal 1, PT) goal revised this date  -       Row Name 01/30/24 1458          Gait Training Goal 1 (PT)    Activity/Assistive Device (Gait  Training Goal 1, PT) gait (walking locomotion)  -CS     [place] Level (Gait Training Goal 1, PT) minimum assist (75% or more patient effort)  -CS     Time Frame (Gait Training Goal 1, PT) 2 weeks  -CS     Progress/Outcome (Gait Training Goal 1, PT) goal revised this date  -CS               User Key  (r) = Recorded By, (t) = Taken By, (c) = Cosigned By      Initials Name Provider Type    CS Rufina Harris, PT Physical Therapist                   Clinical Impression       Row Name 01/30/24 1452          Pain    Pre/Posttreatment Pain Comment c/o R knee pain but did not provide numerical rating  -CS     Pain Intervention(s) Ambulation/increased activity;Rest;Repositioned  -CS       Row Name 01/30/24 1452          Plan of Care Review    Plan of Care Reviewed With patient  -CS     Progress improving  -CS     Outcome Evaluation Pt received in bed and agreeable to PT. Pt required mod A to reach sitting EOB. Posterior lean initially but able to correct with overall good sitting balance. Pt stood 2x from EOB requiring min/mod A x 2. Pt took a few shuffling side-steps to HOB. Pt fatigues quickly requiring 1 seated rest break between bouts. Pt returned to bed with all needs in reach. Pt plans to D/C to rehab hopefully today.  -CS       Row Name 01/30/24 1458          Therapy Assessment/Plan (PT)    Criteria for Skilled Interventions Met (PT) yes;meets criteria  -CS     Therapy Frequency (PT) 6 times/wk  -CS       Row Name 01/30/24 145          Positioning and Restraints    Pre-Treatment Position in bed  -CS     Post Treatment Position bed  -CS     In Bed fowlers;call light within reach;encouraged to call for assist;exit alarm on;legs elevated  -CS               User Key  (r) = Recorded By, (t) = Taken By, (c) = Cosigned By      Initials Name Provider Type    CS Rufina Harris, PT Physical Therapist                   Outcome Measures       Row Name 01/30/24 8569          How much help from another person do you  currently need...    Turning from your back to your side while in flat bed without using bedrails? 2  -CS     Moving from lying on back to sitting on the side of a flat bed without bedrails? 2  -CS     Moving to and from a bed to a chair (including a wheelchair)? 2  -CS     Standing up from a chair using your arms (e.g., wheelchair, bedside chair)? 2  -CS     Climbing 3-5 steps with a railing? 1  -CS     To walk in hospital room? 2  -CS     AM-PAC 6 Clicks Score (PT) 11  -CS     Highest Level of Mobility Goal 4 --> Transfer to chair/commode  -CS       Row Name 01/30/24 1454          Functional Assessment    Outcome Measure Options AM-PAC 6 Clicks Basic Mobility (PT)  -CS               User Key  (r) = Recorded By, (t) = Taken By, (c) = Cosigned By      Initials Name Provider Type    CS Rufina Harris, PT Physical Therapist                                 Physical Therapy Education       Title: PT OT SLP Therapies (Done)       Topic: Physical Therapy (Done)       Point: Mobility training (Done)       Learning Progress Summary             Patient Acceptance, E,TB, VU,DU,NR by LYN at 1/30/2024 1454    Acceptance, E,TB, VU,DU,NR by LYN at 1/29/2024 1438    Acceptance, E, VU by CECI at 1/28/2024 0410    Acceptance, E,TB,D, VU,NR by AMELIA at 1/27/2024 1716    Acceptance, E, VU by BB at 1/27/2024 0409    Acceptance, E, NR by LOUIS at 1/26/2024 1207    Acceptance, E, VU by BB at 1/26/2024 0147    Acceptance, E, VU,NR by LOUIS at 1/25/2024 1151    Acceptance, E, VU by GEETA at 1/24/2024 1404    Acceptance, E,D, VU,NR by AMELIA at 1/23/2024 1744    Acceptance, E, VU,NR by DB at 1/22/2024 1427                         Point: Home exercise program (Done)       Learning Progress Summary             Patient Acceptance, E,TB, VU,DU,NR by LYN at 1/30/2024 1454    Acceptance, E,TB, VU,DU,NR by LYN at 1/29/2024 1438    Acceptance, E, VU by BB at 1/28/2024 0410    Acceptance, E,TB,D, VU,NR by AMELIA at 1/27/2024 1716    Acceptance, E, VU by CECI at 1/27/2024 0409     Acceptance, E, NR by LOUIS at 1/26/2024 1207    Acceptance, E, VU by CECI at 1/26/2024 0147    Acceptance, E, VU,NR by LOUIS at 1/25/2024 1151    Acceptance, E, VU by GEETA at 1/24/2024 1404    Acceptance, E,D, VU,NR by AMELIA at 1/23/2024 1744    Acceptance, E, VU,NR by CLARISSA at 1/22/2024 1427                         Point: Body mechanics (Done)       Learning Progress Summary             Patient Acceptance, E,TB, VU,DU,NR by CS at 1/30/2024 1454    Acceptance, E,TB, VU,DU,NR by LYN at 1/29/2024 1438    Acceptance, E, VU by CECI at 1/28/2024 0410    Acceptance, E,TB,D, VU,NR by AMELIA at 1/27/2024 1716    Acceptance, E, VU by CECI at 1/27/2024 0409    Acceptance, E, NR by LOUIS at 1/26/2024 1207    Acceptance, E, VU by CECI at 1/26/2024 0147    Acceptance, E, VU,NR by LOUIS at 1/25/2024 1151    Acceptance, E,D, VU,NR by AMELIA at 1/23/2024 1744    Acceptance, E, VU,NR by CLARISSA at 1/22/2024 1427                         Point: Precautions (Done)       Learning Progress Summary             Patient Acceptance, E,TB, VU,DU,NR by CS at 1/30/2024 1454    Acceptance, E,TB, VU,DU,NR by LYN at 1/29/2024 1438    Acceptance, E, VU by CECI at 1/28/2024 0410    Acceptance, E,TB,D, VU,NR by AMELIA at 1/27/2024 1716    Acceptance, E, VU by CECI at 1/27/2024 0409    Acceptance, E, NR by LOUIS at 1/26/2024 1207    Acceptance, E, VU by CECI at 1/26/2024 0147    Acceptance, E, VU,NR by LOUIS at 1/25/2024 1151    Acceptance, E,D, VU,NR by AMELIA at 1/23/2024 1744    Acceptance, E, VU,NR by CLARISSA at 1/22/2024 1427                                         User Key       Initials Effective Dates Name Provider Type Discipline    EM 06/16/21 -  Cierra Valdivia, PT Physical Therapist PT    JM 03/07/18 -  Roas Portillo, PTA Physical Therapist Assistant PT    DB 06/16/21 -  Felipa Weathers, PT Physical Therapist PT    DJ 10/25/19 -  Roxi Schofield, PT Physical Therapist PT    CS 09/22/22 -  Rufina Harris, PT Physical Therapist PT    BB 11/16/23 -  Talisha Patel, RN Registered Nurse Nurse                   PT Recommendation and Plan     Plan of Care Reviewed With: patient  Progress: improving  Outcome Evaluation: Pt received in bed and agreeable to PT. Pt required mod A to reach sitting EOB. Posterior lean initially but able to correct with overall good sitting balance. Pt stood 2x from EOB requiring min/mod A x 2. Pt took a few shuffling side-steps to HOB. Pt fatigues quickly requiring 1 seated rest break between bouts. Pt returned to bed with all needs in reach. Pt plans to D/C to rehab hopefully today.     Time Calculation:         PT Charges       Row Name 01/30/24 1455             Time Calculation    Start Time 1426  -CS      Stop Time 1452  -CS      Time Calculation (min) 26 min  -CS      PT Received On 01/30/24  -CS      PT - Next Appointment 01/31/24  -CS         Time Calculation- PT    Total Timed Code Minutes- PT 23 minute(s)  -CS         Timed Charges    55247 - Gait Training Minutes  8  -CS      76766 - PT Therapeutic Activity Minutes 15  -CS         Total Minutes    Timed Charges Total Minutes 23  -CS       Total Minutes 23  -CS                User Key  (r) = Recorded By, (t) = Taken By, (c) = Cosigned By      Initials Name Provider Type    CS Rufina Harris, PT Physical Therapist                  Therapy Charges for Today       Code Description Service Date Service Provider Modifiers Qty    18227573899 HC PT THERAPEUTIC ACT EA 15 MIN 1/29/2024 Rufina Harris, PT GP 2    24423363576 HC GAIT TRAINING EA 15 MIN 1/30/2024 Rufina Harris, PT GP 1    07988753995 HC PT THERAPEUTIC ACT EA 15 MIN 1/30/2024 Rufina Harris, PT GP 1            PT G-Codes  Outcome Measure Options: AM-PAC 6 Clicks Basic Mobility (PT)  AM-PAC 6 Clicks Score (PT): 11  AM-PAC 6 Clicks Score (OT): 11  Modified Boykins Scale: 4 - Moderately severe disability.  Unable to walk without assistance, and unable to attend to own bodily needs without assistance.  PT Discharge Summary  Anticipated Discharge Disposition (PT): skilled  nursing facility    Rufina Harris, PT  1/30/2024

## 2024-01-30 NOTE — PLAN OF CARE
Goal Outcome Evaluation:  Plan of Care Reviewed With: patient        Progress: improving  Outcome Evaluation: Pt has R knee injection eariler today by ortho. Pt agreeable to OT this afternoon, he continues to require X2 assist for bed mobility and standing. Pt can be very stiff at times. He sat EOB to complete grooming with improved sitting posture today.      Anticipated Discharge Disposition (OT): skilled nursing facility

## 2024-01-30 NOTE — PROGRESS NOTES
Name: Anthony Gallegos ADMIT: 2024   : 1945  PCP: Provider, No Known    MRN: [id number] LOS: 8 days   AGE/SEX: 78 y.o. male  ROOM: [facility]     Subjective   Subjective   Asleep on exam. No new issues per nursing. +BM. Underwent rt knee steroid injection by Ortho       Objective   Objective   Vital Signs  Temp:  [97.3 °F (36.3 °C)-98.8 °F (37.1 °C)] 98.6 °F (37 °C)  Heart Rate:  [80-88] 88  Resp:  [18] 18  BP: (111-155)/(61-75) 111/75  SpO2:  [95 %-96 %] 95 %  on   ;   Device (Oxygen Therapy): room air  Body mass index is 21.71 kg/m².  Physical Exam  Vitals and nursing note reviewed.   Constitutional:       General: He is sleeping. He is not in acute distress.  HENT:      Head: Normocephalic.      Mouth/Throat:      Lips: Pink.   Eyes:      General: Lids are normal.   Cardiovascular:      Rate and Rhythm: Normal rate.   Pulmonary:      Effort: Pulmonary effort is normal. No respiratory distress.   Abdominal:      Palpations: Abdomen is soft.   Musculoskeletal:         General: No deformity.      Cervical back: Neck supple.   Skin:     General: Skin is dry.   Neurological:      Comments: Sleeping       Results Review     I reviewed the patient's new clinical results.  Results from last 7 days   Lab Units 24  0523 24  0437 24  0443 24  0504   WBC 10*3/mm3 10.67 8.35 8.74 12.10*   HEMOGLOBIN g/dL 10.3* 10.2* 9.8* 9.3*   PLATELETS 10*3/mm3 432 364 346 301     Results from last 7 days   Lab Units 24  0437 24  0443 24  1738 24  0504 24  0514   SODIUM mmol/L 135* 135*  --  134* 137   POTASSIUM mmol/L 4.0 3.9 4.0 3.5 3.6   CHLORIDE mmol/L 102 105  --  104 107   CO2 mmol/L 24.6 22.0  --  22.5 19.7*   BUN mg/dL 13 16  --  22 32*   CREATININE mg/dL 0.66* 0.79  --  0.98 1.50*   GLUCOSE mg/dL 81 89  --  89 95   EGFR mL/min/1.73 95.4 90.4  --  78.4 47.1*     Results from last 7 days   Lab Units 24  0437 24  0504 24  0514 24  0725   ALBUMIN  "g/dL 2.1* 2.0* 2.2* 2.3*     Results from last 7 days   Lab Units 01/30/24  0523 01/29/24  0437 01/28/24  0443 01/27/24  1738 01/27/24  0504 01/26/24  0514 01/25/24  0725   CALCIUM mg/dL  --  7.8* 7.6*  --  7.3* 7.5* 7.4*   ALBUMIN g/dL  --  2.1*  --   --  2.0* 2.2* 2.3*   MAGNESIUM mg/dL 1.9 1.8 2.2  --  2.2 2.3 2.2   PHOSPHORUS mg/dL  --  2.5  --  2.9 1.9* 1.6* 2.6       No results found for: \"HGBA1C\", \"POCGLU\"    No radiology results for the last day    I have personally reviewed all medications:  Scheduled Medications  cefTRIAXone, 2,000 mg, Intravenous, Q24H  enoxaparin, 30 mg, Subcutaneous, Q24H  pantoprazole, 40 mg, Oral, Q AM  potassium & sodium phosphates, 2 packet, Oral, Once  senna-docusate sodium, 2 tablet, Oral, BID  tamsulosin, 0.4 mg, Oral, Nightly    Infusions   Diet  Diet: Cardiac Diets; Healthy Heart (2-3 Na+); Texture: Soft to Chew (NDD 3); Soft to Chew: Ground Meat; Fluid Consistency: Thin (IDDSI 0)    I have personally reviewed:  [x]  Laboratory   [x]  Microbiology   [x]  Radiology   [x]  EKG/Telemetry  [x]  Cardiology/Vascular   []  Pathology    []  Records       Assessment/Plan     Active Hospital Problems    Diagnosis  POA    **Transient alteration of awareness [R40.4]  Yes    GERD without esophagitis [K21.9]  Yes    BPH (benign prostatic hyperplasia) [N40.0]  Yes    UTI (urinary tract infection) [N39.0]  Yes    Dehydration [E86.0]  Yes    Hyperglycemia [R73.9]  Yes    Altered mental status [R41.82]  Yes    Generalized weakness [R53.1]  Yes    DISH (disseminated idiopathic skeletal hyperostosis) [M48.10]  Yes    Hypertension [I10]  Yes      Resolved Hospital Problems    Diagnosis Date Resolved POA    Leukocytosis [D72.829] 01/30/2024 Yes    MARTITA (acute kidney injury) [N17.9] 01/30/2024 Yes    Altered mental state [R41.82] 01/30/2024 Yes       78 y.o. male admitted with Transient alteration of awareness.    Mr. Gallegos is a 78 y.o. male with a history of DISH, hypertension, ankylosing " spondylitis and impaired mobility who presented to [facility] initially complaining of confusion and was found to have urinary tract infection and admitted to monitored unit.     -Urinary tract infection: CT scan of the abdomen and pelvis showed continued bladder distention and some evidence of possible pyelonephritis with hydronephrosis and stranding.  Urine culture now growing Klebsiella.  Has been afebrile for greater than 24 hours.  Continue ceftriaxone 2 g every 24 hours x 10 days to cover for pyelonephritis.  Can transition to oral antibiotics at discharge; plan to switch to augmentin based on susceptibility    -Right knee pain: Patient complaining of right knee pain.  Swelling and tenderness on exam.  Uric acid negative.  Normal WBC.  XR with possible effusion, no fracture or dislocation.  S/P steroid injection; no restrictions, okay to ambulate with PT     -Urinary retention: Mariscal catheter was placed and urology was consulted.  Urine continues to be blood-tinged.  If the Mariscal continues to clog, may need to place larger urinary catheter.  Patient going to be discharged with Mariscal in place.  Urology has now signed off. Follow up in 2 weeks     -Dysphagia: SLP evaluated.  Recommending soft, ground meats with thin liquids.  Meds whole in puréed.  No straws.  Continuing to follow.     -Leukocytosis: WBC has now normalized.  No obvious new source of infection.  Clinically patient appears to be improving, has been afebrile for over 24 hours.  Blood cultures remain no growth to date.     -Acute renal failure, electrolyte derangements: Creatinine normal today.  Baseline is normal.  Corrected calcium has been normal.  Avoid nephrotoxic agents including NSAIDs and contrast dyes.  Electrolyte replacement protocol.  Monitor with daily BMP.  Nephrology consulted and following.  Appreciate their assistance.     -Anemia: Hgb improved and stable, baseline appears around 12.  No signs of active bleeding.  Monitor with daily CBC and transfuse for Hgb less than 7.         Lovenox 30 mg SC daily for DVT prophylaxis.  Full code.  Discussed with nursing staff and CCP.  Anticipate discharge to SNU facility once arrangements have been made. Likely tomorrow      NOHEMI Anand  [facility]  01/30/24  15:21 EST

## 2024-01-30 NOTE — CASE MANAGEMENT/SOCIAL WORK
Continued Stay Note  [facility]     Patient Name: Anthony Gallegos  MRN: [id number]  Today's Date: 1/30/2024    Admit Date: 1/21/2024    Plan: [facility] pending pre-cert updated via transport   Discharge Plan       Row Name 01/30/24 1316       Plan    Plan [facility] pending pre-cert updated via transport    Patient/Family in Agreement with Plan yes    Plan Comments Recieved notification from Misael/[facility] pre-cert team, SNF approval for [facility]. CCP spoke with dtr and she has changed her mind on SNF choice. SHe requests referral to [facility], [facility], [facility] or [facility] for SNF rehab. SPoke with Shaneka no beds at [facility]. Spoke with Mireya/Ana, no beds available at [facility], [facility] or [facility], However, can offer bed at [place]. SPoke with dtr and she wishes her dad to go there for rehab. CCP notified [facility] pre -cert team to switch facility for pre-cert and when approved. Per dtr pt will need transportation to [place]. CCP notified Maddie/Sherita of change of SNF. Packet in bonnie. tirso rice/grover                   Discharge Codes    No documentation.                 Expected Discharge Date and Time       Expected Discharge Date Expected Discharge Time    Jan 30, 2024               Martha Justice RN

## 2024-01-30 NOTE — DISCHARGE SUMMARY
Patient Name: Anthony Gallegos  : 1945  MRN: [id number]    Date of Admission: 2024  Date of Discharge:  2024  Primary Care Physician: Provider, No Known      Chief Complaint:   Dysuria and Altered Mental Status      Discharge Diagnoses     Active Hospital Problems    Diagnosis  POA    **Transient alteration of awareness [R40.4]  Yes    GERD without esophagitis [K21.9]  Yes    BPH (benign prostatic hyperplasia) [N40.0]  Yes    UTI (urinary tract infection) [N39.0]  Yes    Dehydration [E86.0]  Yes    Hyperglycemia [R73.9]  Yes    Altered mental status [R41.82]  Yes    Generalized weakness [R53.1]  Yes    DISH (disseminated idiopathic skeletal hyperostosis) [M48.10]  Yes    Hypertension [I10]  Yes      Resolved Hospital Problems    Diagnosis Date Resolved POA    Leukocytosis [D72.829] 2024 Yes    MARTITA (acute kidney injury) [N17.9] 2024 Yes    Altered mental state [R41.82] 2024 Yes        Hospital Course     Mr. Gallegos is a 78 y.o. male with a history of HTN, BPH who presented to [facility] initially complaining of worsening confusion.  Please see the admitting history and physical for further details.  He was found to have UTI/pyelonephritis, MARTITA and was admitted to the hospital for further evaluation and treatment.  Started on IV fluids. Nephrology and Urology were consulted. Atenolol has been stopped due to hypotension; BP has been acceptable. Urine culture grew klebsiella. BC remained no growth. Recommend to continue augmentin to complete 10 day course treatment. Nephrology is recommending outpatient follow up in 2-3 weeks for further evaluation of proteinuria. Cr has normalized. He has required indwelling catheter placement due to retention/BPH. He will be discharged with catheter and follow up with Urology in 2 weeks. Ortho has also seen for knee pain, possible effusion. Underwent steroid injection rt knee on 24; no restrictions per Ortho, he may ambulate  w/PT. Stable from medical standpoint for discharge to SNF.  Day of Discharge     Subjective:  No complaints. Feels well. Agrees with discharge to SNF today    Physical Exam:  Temp:  [98.2 °F (36.8 °C)-99.1 °F (37.3 °C)] 98.2 °F (36.8 °C)  Heart Rate:  [77-91] 78  Resp:  [16-18] 16  BP: (109-125)/(53-62) 118/53  Body mass index is 21.77 kg/m².  Physical Exam  Vitals and nursing note reviewed.   Constitutional:       General: He is not in acute distress.     Appearance: He is ill-appearing. He is not toxic-appearing.   HENT:      Head: Normocephalic.      Mouth/Throat:      Mouth: Mucous membranes are moist.   Eyes:      Conjunctiva/sclera: Conjunctivae normal.   Cardiovascular:      Rate and Rhythm: Normal rate and regular rhythm.   Pulmonary:      Effort: Pulmonary effort is normal. No respiratory distress.      Breath sounds: No wheezing or rales.   Abdominal:      General: Bowel sounds are normal.      Palpations: Abdomen is soft.   Musculoskeletal:      Cervical back: Neck supple.      Right lower leg: No edema.      Left lower leg: No edema.   Skin:     General: Skin is warm and dry.   Neurological:      Mental Status: He is alert. Mental status is at baseline.   Psychiatric:         Mood and Affect: Mood normal.         Behavior: Behavior normal.   Consultants     Consult Orders (all) (From admission, onward)       Start     Ordered    01/28/24 1248  Inpatient Orthopedic Surgery Consult  Once        Specialty:  Orthopedic Surgery  Provider:  Bryce Salvador MD    01/28/24 1247    01/23/24 1237  Inpatient Urology Consult  Once        Specialty:  Urology  Provider:  Khadar Lynne Jr., MD    01/23/24 1236    01/23/24 0845  Inpatient Nephrology Consult  Once        Specialty:  Nephrology  Provider:  Thompson Novoa MD    01/23/24 0845                  Procedures     * Surgery not found *    Imaging Results (All)       Procedure Component Value Units Date/Time    XR Knee 1 or 2 View Right [[id number]]  Collected: 01/28/24 1422     Updated: 01/28/24 1430    Narrative:      RIGHT KNEE: AP, OBLIQUE LATERAL.     HISTORY: Knee pain and swelling.     FINDINGS: Exam is somewhat limited due to patient difficulty moving the  knee. There is osteoarthritis with tricompartmental joint space  narrowing and marginal spur formation. Degenerative subchondral cyst are  present within the patella. There is a knee joint effusion. No fracture  or acute osseous abnormality is evident. Vascular calcifications are  present.       Impression:      Limited exam demonstrates a knee joint effusion. 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 No evidence for fracture or acute osseous  abnormality. There are atherosclerotic calcifications.     This report was finalized on 1/28/2024 2:27 PM by Dr. Bobby Eason M.D on Workstation: [id number]        Renal Bilateral [[id number]] Collected: 01/25/24 0533     Updated: 01/25/24 0538    Narrative:      RENAL ULTRASOUND     HISTORY: Hydronephrosis     COMPARISON: January 23, 2024     TECHNIQUE: Grayscale and color Doppler sonographic images were obtained  through the kidneys and bladder.     FINDINGS:  Right kidney measures 12.3 x 5.1 x 5.2 cm. Left kidney measures 11.4 x  7.0 x 5.9 cm. No hydronephrosis is seen on either side. The patient does  have a simple appearing right renal cyst. No additional follow-up is  necessary. Renal echotexture appears normal. The patient has a Mariscal  catheter within the urinary bladder. Patient is noted to have a thick  walled appearance to the bladder, as well as debris within the urinary  bladder, suggesting cystitis. Bladder diverticulum is noted.       Impression:         1. No hydronephrosis seen on the current study.  2. Thick-walled appearance to the urinary bladder, as well as debris  within the bladder, suggesting cystitis.     This report was finalized on 1/25/2024 5:35 AM by Dr. Kirsten Peña M.D on Workstation: [id number]       CT Abdomen Pelvis  Without Contrast [[id number]] Collected: 01/23/24 0959     Updated: 01/23/24 1005    Narrative:      CT ABDOMEN AND PELVIS WITHOUT IV CONTRAST     HISTORY: Pyelonephritis versus perinephric abscess.     TECHNIQUE:  CT includes axial imaging from the lung bases to the  trochanters without intravenous contrast and without use of oral  contrast. Data reconstructed in coronal and sagittal planes. Radiation  dose reduction techniques were utilized, including automated exposure  control and exposure modulation based on body size.     COMPARISON: CT abdomen and pelvis 12/20/2023.     FINDINGS: There is mild linear subsegmental basal atelectasis. Liver,  spleen, pancreas appear grossly normal allowing for limitation without  IV contrast and due to streak artifact from patient being scanned with  his arms to his side. There is mild bilateral adrenal thickening,  greater on the left without change.     A right lower pole renal exophytic low-density lesion is most likely a  cyst and measures 5.6 cm and this was also present on the previous exam  12/20/2023. There is very mild hydronephrosis that is greater on the  right and new when compared to the prior exam. Mild hydroureter is  present to the level of the urinary bladder. There is general bladder  distention with generalized urinary bladder wall thickening consistent  with cystitis. Widemouth anterior bladder dome diverticulum is present  just posterior to the level of umbilicus. Pericystic stranding is  present.     There is no bowel dilatation or evidence for bowel obstruction. Small  periumbilical hernia contains fat.     There is syndesmophyte formation within the lower thoracic spine and  lumbar spine with bony fusion of the vertebral bodies. There is also  bony fusion of the spinous processes, facet joints and there is  ankylosis of the sacroiliac joints.     Left total hip arthroplasty is present associated with beam-hardening  artifact. There is mild generalized body  wall edema.        Impression:      1. Generalized urinary bladder wall distention with wall thickening  consistent with cystitis and urinary retention. Anterior bladder dome  diverticulum. Mild hydronephrosis, greater on the right, potential  pyelonephritis.  2. 5.6 cm right lower pole exophytic low-density lesion is consistent  with a cyst and is without change.  3. Mild generalized body wall edema.  4. Small periumbilical hernia contains fat.  5. Osseous findings suggest ankylosing spondylitis.     Radiation dose reduction techniques were utilized, including automated  exposure control and exposure modulation based on body size.        This report was finalized on 1/23/2024 10:02 AM by Dr. Bobby Eason M.D on Workstation: [id number]       XR Chest 1 View [[id number]] Collected: 01/21/24 1859     Updated: 01/21/24 1902    Narrative:      PORTABLE CHEST X-RAY     HISTORY: Altered mental status.     Portable chest x-ray is provided. Correlation: December 20, 2023 and  June 21, 2017.     FINDINGS: The cardiomediastinal silhouette is normal. The lungs are  clear. The costophrenic sulci are dry and the bones appear normal. There  is no pneumothorax.       Impression:      Negative.     This report was finalized on 1/21/2024 6:59 PM by Dr. Walter Henderson M.D on Workstation: [id number]       CT Head Without Contrast [[id number]] Collected: 01/21/24 1853     Updated: 01/21/24 1859    Narrative:      HEAD CT     HISTORY: Confusion.     TECHNIQUE: Noncontrast head CT is provided. Comparison: Head CT December 20, 2023.     FINDINGS: The ventricles are normal in caliber. There is loss of  cerebral parenchymal volume with patchy low density in the frontal white  matter similar as observed previously. No new parenchymal abnormality is  present. There is no hemorrhage or acute ischemic change. The bones of  the skull appear normal. The mastoid air cells, middle ears, and  visualized paranasal sinuses are clear. Extensive  atheromatous vascular  calcification       Impression:      Chronic changes in the brain. No acute abnormality.        Radiation dose reduction techniques were utilized, including automated  exposure control and exposure modulation based on body size.        This report was finalized on 1/21/2024 6:56 PM by Dr. Walter Henderson M.D on Workstation: [id number]             Results for orders placed during the hospital encounter of 12/20/23    Duplex Venous Lower Extremity - Left CAR    Interpretation Summary    Normal left lower extremity venous duplex scan.      Pertinent Labs     Results from last 7 days   Lab Units 01/31/24 0439 01/30/24 0523 01/29/24 0437 01/28/24 0443   WBC 10*3/mm3 9.54 10.67 8.35 8.74   HEMOGLOBIN g/dL 9.2* 10.3* 10.2* 9.8*   PLATELETS 10*3/mm3 438 432 364 346     Results from last 7 days   Lab Units 01/29/24 0437 01/28/24 0443 01/27/24 1738 01/27/24  0504 01/26/24  0514   SODIUM mmol/L 135* 135*  --  134* 137   POTASSIUM mmol/L 4.0 3.9 4.0 3.5 3.6   CHLORIDE mmol/L 102 105  --  104 107   CO2 mmol/L 24.6 22.0  --  22.5 19.7*   BUN mg/dL 13 16  --  22 32*   CREATININE mg/dL 0.66* 0.79  --  0.98 1.50*   GLUCOSE mg/dL 81 89  --  89 95   EGFR mL/min/1.73 95.4 90.4  --  78.4 47.1*     Results from last 7 days   Lab Units 01/29/24 0437 01/27/24  0504 01/26/24  0514 01/25/24  0725   ALBUMIN g/dL 2.1* 2.0* 2.2* 2.3*     Results from last 7 days   Lab Units 01/31/24  0440 01/30/24  0523 01/29/24 0437 01/28/24 0443 01/27/24 1738 01/27/24  0504 01/26/24  0514 01/25/24  0725   CALCIUM mg/dL  --   --  7.8* 7.6*  --  7.3* 7.5* 7.4*   ALBUMIN g/dL  --   --  2.1*  --   --  2.0* 2.2* 2.3*   MAGNESIUM mg/dL 2.0 1.9 1.8 2.2  --  2.2 2.3 2.2   PHOSPHORUS mg/dL  --   --  2.5  --  2.9 1.9* 1.6* 2.6         Results from last 7 days   Lab Units 01/28/24  0443 01/27/24  0504 01/26/24  1651   CREATININE UR mg/dL  --   --  68.9   PROTEIN TOTAL URINE mg/dL  --   --  260.7   URIC ACID mg/dL 4.2   < >  --   "  PROT/CREAT RATIO UR mg/G Crea  --   --  3,783.7*    < > = values in this interval not displayed.         Invalid input(s): \"LDLCALC\"          Test Results Pending at Discharge       Discharge Details        Discharge Medications        New Medications        Instructions Start Date   acetaminophen 325 MG tablet  Commonly known as: TYLENOL   650 mg, Oral, Every 4 Hours PRN      amoxicillin-clavulanate 875-125 MG per tablet  Commonly known as: AUGMENTIN   1 tablet, Oral, 2 Times Daily      melatonin 3 MG tablet   3 mg, Oral, Nightly PRN             Continue These Medications        Instructions Start Date   methotrexate 2.5 MG tablet   Take 1 tablet by mouth 2 (Two) Times a Week. Pt takes every Wednesday and thursday      OMEGA 3 PO   1 capsule, Oral, Daily      pantoprazole 40 MG EC tablet  Commonly known as: PROTONIX   40 mg, Oral, Every Early Morning      tamsulosin 0.4 MG capsule 24 hr capsule  Commonly known as: FLOMAX   0.4 mg, Oral, Nightly             Stop These Medications      atenolol 50 MG tablet  Commonly known as: TENORMIN     esomeprazole 20 MG capsule  Commonly known as: nexIUM     Lidocaine 4 %     muscle rub 10-15 % cream cream     Naproxen Sodium 220 MG capsule              No Known Allergies    Discharge Disposition:  Skilled Nursing Facility (DC - External)      Discharge Diet:  Diet Order   Procedures    Diet: Cardiac Diets; Healthy Heart (2-3 Na+); Texture: Soft to Chew (NDD 3); Soft to Chew: Ground Meat; Fluid Consistency: Thin (IDDSI 0)       Discharge Activity:   Activity Instructions       Activity as Tolerated              CODE STATUS:    Code Status and Medical Interventions:   Ordered at: 01/21/24 2052     Code Status (Patient has no pulse and is not breathing):    CPR (Attempt to Resuscitate)     Medical Interventions (Patient has pulse or is breathing):    Full       No future appointments.   Contact information for follow-up providers       Provider, No Known .    Why: Follow up with " PCP 1 week after discharge from rehab  Contact information:  [facility] [address]  [phone]               Thompson Novoa MD. Schedule an appointment as soon as possible for a visit in 3 week(s).    Specialty: Nephrology  Contact information:  [address]  [address]  [facility] [address]  [phone]               Shaylee Rodriguez APRN. Schedule an appointment as soon as possible for a visit in 2 week(s).    Specialty: Urology  Contact information:  [address]  [address]  [facility] [address]  [phone]                       Contact information for after-discharge care       Destination       [facility] .    Service: Skilled Nursing  Contact information:  [address] [facility] [address] [phone]                                   Time Spent on Discharge:  Greater than 30 minutes      NOHEMI Anand  [facility]  01/31/24  15:04 EST

## 2024-01-30 NOTE — NURSING NOTE
CWOCN- RN consulted [facility] for left heel and coccyx assessment. Patient with hard callus on the left heel with unstageable pressure injury noted. Tissue is firm, purple. Recommend to apply lotion to BLE, heels, feet to soften skin and wound. Off load heels- may need boots if cannot off load effectively with pillows.  Assessed coccyx. Patient with pink, dry, resurfaced skin on the right buttock and coccyx. No open skin. Skin is blanching. Recommend to use a sacral silicone border dressing and change daily/PRN.  Ongoing prevention measures related to pressure injury/skin. Reviewed plan with patient and RN.        01/30/24 1605   Wound 01/29/24 Left posterior heel Pressure Injury   Placement Date: 01/29/24   Present on Original Admission: No  Side: Left  Orientation: posterior  Location: heel  Primary Wound Type: Pressure Injury   Wound Image    Pressure Injury Stage U   Base black eschar   Periwound intact   Periwound Temperature warm   Periwound Skin Turgor firm   Edges callused   Wound Length (cm) 2 cm   Wound Width (cm) 2 cm   Wound Surface Area (cm^2) 4 cm^2   Drainage Amount none   Care, Wound   (off load)   Dressing Care open to air

## 2024-01-30 NOTE — PLAN OF CARE
Problem: Skin Injury Risk Increased  Goal: Skin Health and Integrity  Outcome: Ongoing, Not Progressing  Intervention: Optimize Skin Protection  Recent Flowsheet Documentation  Taken 1/30/2024 1542 by Leticia Dickey RN  Pressure Reduction Techniques:   sit time limited to 2 hours   heels elevated off bed  Head of Bed (HOB) Positioning: HOB elevated  Pressure Reduction Devices:   positioning supports utilized   pressure-redistributing mattress utilized   heel offloading device utilized  Skin Protection:   incontinence pads utilized   skin sealant/moisture barrier applied   skin-to-skin areas padded  Taken 1/30/2024 1409 by Leticia Dickey RN  Head of Bed ([facility]) Positioning: HOB elevated  Taken 1/30/2024 1340 by Leticia Dickey RN  Pressure Reduction Techniques:   sit time limited to 2 hours   heels elevated off bed  Head of Bed (HOB) Positioning: [facility] elevated  Pressure Reduction Devices:   specialty bed utilized   positioning supports utilized  Skin Protection: skin-to-skin areas padded  Taken 1/30/2024 1220 by Leticia Dickey RN  Head of Bed ([facility]) Positioning: HOB elevated  Taken 1/30/2024 1042 by Leticia Dickey RN  Head of Bed ([facility]) Positioning: HOB elevated  Taken 1/30/2024 0824 by Leticia Dickey RN  Pressure Reduction Techniques: sit time limited to 2 hours  Head of Bed (HOB) Positioning: [facility] elevated  Pressure Reduction Devices:   pressure-redistributing mattress utilized   positioning supports utilized   heel offloading device utilized  Skin Protection: skin-to-skin areas padded   Goal Outcome Evaluation:  Plan of Care Reviewed With: patient           Outcome Evaluation: Patient's VS stable, ice to right knee this morning and no complaints of pain, heel boot applied to left leg/foot and awaiting for boot for right leg/foot. Wound verified DTI on left heel today and orders placed. Discharge to rehab facility with EMS transport is pending. Patient was encouraged to eat more and to drink nutrition supplements which he is trying  to cooperate.

## 2024-01-30 NOTE — THERAPY TREATMENT NOTE
Patient Name: Anthony Gallegos  : 1945    MRN: [id number]                              Today's Date: 2024       Admit Date: 2024    Visit Dx:     ICD-10-CM ICD-9-CM   1. Altered mental status, unspecified altered mental status type  R41.82 780.97   2. Leukocytosis, unspecified type  D72.829 288.60     Patient Active Problem List   Diagnosis    Ankylosing spondylitis of cervical region    Recent unexplained weight loss    Abnormal serum lipase level    Abnormal serum level of amylase    Hypertension    Boil    Immobility    Fall from bed    Tetrahydrocannabinol (THC) use disorder, mild, abuse    Generalized pain        Grief    DISH (disseminated idiopathic skeletal hyperostosis)    Generalized weakness    Severe malnutrition    Altered mental status    Transient alteration of awareness    GERD without esophagitis    BPH (benign prostatic hyperplasia)    UTI (urinary tract infection)    Dehydration    Hyperglycemia     Past Medical History:   Diagnosis Date    Abscess of scrotum     MARTITA (acute kidney injury)     Altered mental state     Arthritis     AS (ankylosing spondylitis)     Hypertension     Leukocytosis     Tetrahydrocannabinol (THC) use disorder, mild, abuse 2023    Uveitis      Past Surgical History:   Procedure Laterality Date    COLONOSCOPY      ROTATOR CUFF REPAIR Right     TOTAL HIP ARTHROPLASTY Left       General Information       Row Name 24 1517          OT Time and Intention    Document Type therapy note (daily note)  -SM     Mode of Treatment occupational therapy;co-treatment  co treat with PT 2/2 decreased activity tolerance, weakness. OT to address ADLs and ADL transfers during co treat  -       Row Name 24 1517          General Information    Patient Profile Reviewed yes  -SM     Existing Precautions/Restrictions fall  wears sunglasses due to light sensitivity  -       Row Name 24 1513          Cognition    Orientation Status (Cognition)  oriented x 3  -       Row Name 01/30/24 1517          Safety Issues, Functional Mobility    Safety Issues Affecting Function (Mobility) judgment;insight into deficits/self-awareness;safety precaution awareness  -     Impairments Affecting Function (Mobility) balance;coordination;endurance/activity tolerance;strength;range of motion (ROM);cognition  -               User Key  (r) = Recorded By, (t) = Taken By, (c) = Cosigned By      Initials Name Provider Type     Mireya Snow OT Occupational Therapist                     Mobility/ADL's       Row Name 01/30/24 1518          Bed Mobility    Supine-Sit [place] (Bed Mobility) moderate assist (50% patient effort);2 person assist;verbal cues  -     Sit-Supine [place] (Bed Mobility) moderate assist (50% patient effort);maximum assist (25% patient effort);2 person assist;verbal cues  -     Assistive Device (Bed Mobility) head of bed elevated;bed rails  -       Row Name 01/30/24 1518          Sit-Stand Transfer    Sit-Stand [place] (Transfers) minimum assist (75% patient effort);moderate assist (50% patient effort);2 person assist;verbal cues  -     Assistive Device (Sit-Stand Transfers) walker, front-wheeled  -     Comment, (Sit-Stand Transfer) X2 reps, fatiqued quickly.  -       Row Name 01/30/24 1518          Grooming Assessment/Training    Clay Level (Grooming) oral care regimen;standby assist;verbal cues  -     Position (Grooming) edge of bed sitting  -     Comment, (Grooming) vision appearing limiting, has difficulty with orientation of putting in dentures  -               User Key  (r) = Recorded By, (t) = Taken By, (c) = Cosigned By      Initials Name Provider Type    Mireya Tavarez OT Occupational Therapist                   Obj/Interventions       Row Name 01/30/24 1519          Shoulder (Therapeutic Exercise)    Shoulder (Therapeutic Exercise) AROM (active range of motion)  -     Shoulder AROM  (Therapeutic Exercise) bilateral;flexion;extension;10 repetitions;sitting  -       Row Name 01/30/24 1519          Motor Skills    Therapeutic Exercise shoulder  -[facility] Name 01/30/24 1519          Balance    Static Sitting Balance standby assist  -     Position, Sitting Balance sitting edge of bed  -     Static Standing Balance minimal assist;2-person assist  -     Dynamic Standing Balance minimal assist;moderate assist;2-person assist  -     Position/Device Used, Standing Balance supported;walker, rolling  -               User Key  (r) = Recorded By, (t) = Taken By, (c) = Cosigned By      Initials Name Provider Type    Mireya Tavarez OT Occupational Therapist                   Goals/Plan    No documentation.                  Clinical Impression       [facility] Name 01/30/24 1520          Pain Assessment    Pain Location - Side/Orientation Right  -     Pain Location - knee  -     Additional Documentation Pain Scale: FACES Pre/Post-Treatment (Group)  -[facility] Name 01/30/24 1520          Pain Scale: FACES Pre/Post-Treatment    Pain: FACES Scale, Pretreatment 2-->hurts little bit  -     Posttreatment Pain Rating 2-->hurts little bit  -[facility] Name 01/30/24 1520          Plan of Care Review    Plan of Care Reviewed With patient  -     Progress improving  -     Outcome Evaluation Pt has R knee injection eariler today by ortho. Pt agreeable to OT this afternoon, he continues to require X2 assist for bed mobility and standing. Pt can be very stiff at times. He sat EOB to complete grooming with improved sitting posture today.  -       Row Name 01/30/24 1520          Therapy Plan Review/Discharge Plan (OT)    Anticipated Discharge Disposition (OT) skilled nursing facility  -[facility] Name 01/30/24 1520          Positioning and Restraints    Pre-Treatment Position in bed  -     Post Treatment Position bed  -     In Bed fowlers;call light within reach;encouraged to call for  assist;exit alarm on;notified nsg  -               User Key  (r) = Recorded By, (t) = Taken By, (c) = Cosigned By      Initials Name Provider Type    Mireya Tavarez OT Occupational Therapist                   Outcome Measures       Row Name 01/30/24 1521          How much help from another is currently needed...    Putting on and taking off regular lower body clothing? 1  -SM     Bathing (including washing, rinsing, and drying) 1  -SM     Toileting (which includes using toilet bed pan or urinal) 1  -SM     Putting on and taking off regular upper body clothing 2  -SM     Taking care of personal grooming (such as brushing teeth) 3  -SM     Eating meals 3  -SM     AM-PAC 6 Clicks Score (OT) 11  -SM       Row Name 01/30/24 1454          How much help from another person do you currently need...    Turning from your back to your side while in flat bed without using bedrails? 2  -CS     Moving from lying on back to sitting on the side of a flat bed without bedrails? 2  -CS     Moving to and from a bed to a chair (including a wheelchair)? 2  -CS     Standing up from a chair using your arms (e.g., wheelchair, bedside chair)? 2  -CS     Climbing 3-5 steps with a railing? 1  -CS     To walk in hospital room? 2  -CS     AM-PAC 6 Clicks Score (PT) 11  -CS     Highest Level of Mobility Goal 4 --> Transfer to chair/commode  -CS       Row Name 01/30/24 1521 01/30/24 1454       Functional Assessment    Outcome Measure Options AM-PAC 6 Clicks Daily Activity (OT)  - AM-PAC 6 Clicks Basic Mobility (PT)  -CS              User Key  (r) = Recorded By, (t) = Taken By, (c) = Cosigned By      Initials Name Provider Type    Mireya Tavarez OT Occupational Therapist    Rufina Her PT Physical Therapist                    Occupational Therapy Education       Title: PT OT SLP Therapies (Done)       Topic: Occupational Therapy (Done)       Point: ADL training (Done)       Description:   Instruct learner(s) on proper  safety adaptation and remediation techniques during self care or transfers.   Instruct in proper use of assistive devices.                  Learning Progress Summary             Patient Acceptance, E, VU by BB at 1/28/2024 0410    Acceptance, E, VU by BB at 1/27/2024 0409    Acceptance, E, VU by BB at 1/26/2024 0147    Acceptance, E,TB, VU by  at 1/24/2024 1412    Comment: Patient is instructed in role of OT, discharge planning, home safety, energy conservation/work simplification strategies                         Point: Home exercise program (Done)       Description:   Instruct learner(s) on appropriate technique for monitoring, assisting and/or progressing therapeutic exercises/activities.                  Learning Progress Summary             Patient Acceptance, E, VU by BB at 1/28/2024 0410    Acceptance, E, VU by BB at 1/27/2024 0409    Acceptance, E, VU by BB at 1/26/2024 0147    Acceptance, E,TB, VU by  at 1/24/2024 1412    Comment: Patient is instructed in role of OT, discharge planning, home safety, energy conservation/work simplification strategies                         Point: Precautions (Done)       Description:   Instruct learner(s) on prescribed precautions during self-care and functional transfers.                  Learning Progress Summary             Patient Acceptance, E, VU by BB at 1/28/2024 0410    Acceptance, E, VU by BB at 1/27/2024 0409    Acceptance, E, VU by BB at 1/26/2024 0147    Acceptance, E,TB, VU by  at 1/24/2024 1412    Comment: Patient is instructed in role of OT, discharge planning, home safety, energy conservation/work simplification strategies                         Point: Body mechanics (Done)       Description:   Instruct learner(s) on proper positioning and spine alignment during self-care, functional mobility activities and/or exercises.                  Learning Progress Summary             Patient Acceptance, E, VU by BB at 1/28/2024 0410    Acceptance, E, VU by  BB at 1/27/2024 0409    Acceptance, E, VU by  at 1/26/2024 0147    Acceptance, E,TB, VU by  at 1/24/2024 1412    Comment: Patient is instructed in role of OT, discharge planning, home safety, energy conservation/work simplification strategies                                         User Key       Initials Effective Dates Name Provider Type Discipline     08/31/23 -  Nessa Orellana OT Occupational Therapist OT     11/16/23 -  Talisha Patel, RN Registered Nurse Nurse                  OT Recommendation and Plan     Plan of Care Review  Plan of Care Reviewed With: patient  Progress: improving  Outcome Evaluation: Pt has R knee injection eariler today by ortho. Pt agreeable to OT this afternoon, he continues to require X2 assist for bed mobility and standing. Pt can be very stiff at times. He sat EOB to complete grooming with improved sitting posture today.     Time Calculation:         Time Calculation- OT       Row Name 01/30/24 1522 01/30/24 1455          Time Calculation- OT    OT Start Time 1426  -SM --     OT Stop Time 1449  -SM --     OT Time Calculation (min) 23 min  -SM --     Total Timed Code Minutes- OT 23 minute(s)  -SM --     OT Received On 01/30/24  -SM --     OT - Next Appointment 01/31/24  -SM --        Timed Charges    76623 - Gait Training Minutes  -- 8  -CS     49961 - OT Therapeutic Activity Minutes 15  -SM --     81182 - OT Self Care/Mgmt Minutes 8  -SM --        Total Minutes    Timed Charges Total Minutes 23  -SM 8  -CS      Total Minutes 23  -SM 8  -CS               User Key  (r) = Recorded By, (t) = Taken By, (c) = Cosigned By      Initials Name Provider Type     Mireya Snow OT Occupational Therapist    CS Rufina Harris, PT Physical Therapist                  Therapy Charges for Today       Code Description Service Date Service Provider Modifiers Qty    14471650467 HC OT THERAPEUTIC ACT EA 15 MIN 1/30/2024 Mireya Snow OT GO 1    36700787939 HC OT SELF CARE/MGMT/TRAIN  EA 15 MIN 1/30/2024 Mireya Snow, OT GO 1                 Mireya Snow, STUART  1/30/2024

## 2024-01-30 NOTE — PROGRESS NOTES
Ortho Procedure note:    Under sterile conditions the right knee was injected through a superior lateral portal, I injected with 40 mg of Depo-Medrol and 5 cc of half percent Marcaine.  Patient tolerated the procedure well without complication.  He and the nurse was present were instructed to ice the area half an hour 2-3 times a day as needed for soreness.  He may ambulate with physical therapy without restrictions.    Electronically signed by rByce Salvador MD, 01/30/24, 7:52 AM EST.

## 2024-01-30 NOTE — PLAN OF CARE
Goal Outcome Evaluation:  Plan of Care Reviewed With: patient        Progress: improving  Outcome Evaluation: Patient A&Ox3.  Had a large bowel movement during the s hift.  Report of right knee pain.  VSS.  WCTM for the rest of the shift.

## 2024-01-30 NOTE — SIGNIFICANT NOTE
01/30/24 1000   Post Acute Pre-Cert Documentation   Request Submitted by Facility - Type: Hospital   Post-Acute Authorization Type Submitted: SNF   Date Post Acute Pre-Cert Inititated per Facility 01/29/24   Date Post Acute Pre-Cert Completed 01/30/24   Accepting Facility [facility]   Hospital Discharge Date Requested 01/30/24   All Clinicals Submitted? Yes   Had Accepting Facility at Time of Submission Yes   Response Received from Insurance? Approval   Response Communicated to: ;Accepting Facility Liaison;Accepting Facility Auth Department   Authorization Number: [id number]   Post Acute Pre-Cert Initiated Comment ALIREZA REECE

## 2024-01-31 VITALS
OXYGEN SATURATION: 97 % | HEIGHT: 75 IN | TEMPERATURE: 98.6 F | BODY MASS INDEX: 21.66 KG/M2 | SYSTOLIC BLOOD PRESSURE: 115 MMHG | RESPIRATION RATE: 16 BRPM | WEIGHT: 174.2 LBS | DIASTOLIC BLOOD PRESSURE: 61 MMHG | HEART RATE: 75 BPM

## 2024-01-31 LAB
BASOPHILS # BLD AUTO: 0.05 10*3/MM3 (ref 0–0.2)
BASOPHILS NFR BLD AUTO: 0.5 % (ref 0–1.5)
DEPRECATED RDW RBC AUTO: 42.4 FL (ref 37–54)
EOSINOPHIL # BLD AUTO: 0.09 10*3/MM3 (ref 0–0.4)
EOSINOPHIL NFR BLD AUTO: 0.9 % (ref 0.3–6.2)
ERYTHROCYTE [DISTWIDTH] IN BLOOD BY AUTOMATED COUNT: 12.7 % (ref 12.3–15.4)
HCT VFR BLD AUTO: 27.5 % (ref 37.5–51)
HGB BLD-MCNC: 9.2 G/DL (ref 13–17.7)
IMM GRANULOCYTES # BLD AUTO: 0.08 10*3/MM3 (ref 0–0.05)
IMM GRANULOCYTES NFR BLD AUTO: 0.8 % (ref 0–0.5)
LYMPHOCYTES # BLD AUTO: 1.1 10*3/MM3 (ref 0.7–3.1)
LYMPHOCYTES NFR BLD AUTO: 11.5 % (ref 19.6–45.3)
MAGNESIUM SERPL-MCNC: 2 MG/DL (ref 1.6–2.4)
MCH RBC QN AUTO: 30.9 PG (ref 26.6–33)
MCHC RBC AUTO-ENTMCNC: 33.5 G/DL (ref 31.5–35.7)
MCV RBC AUTO: 92.3 FL (ref 79–97)
MONOCYTES # BLD AUTO: 0.98 10*3/MM3 (ref 0.1–0.9)
MONOCYTES NFR BLD AUTO: 10.3 % (ref 5–12)
NEUTROPHILS NFR BLD AUTO: 7.24 10*3/MM3 (ref 1.7–7)
NEUTROPHILS NFR BLD AUTO: 76 % (ref 42.7–76)
NRBC BLD AUTO-RTO: 0 /100 WBC (ref 0–0.2)
PLATELET # BLD AUTO: 438 10*3/MM3 (ref 140–450)
PMV BLD AUTO: 9.2 FL (ref 6–12)
RBC # BLD AUTO: 2.98 10*6/MM3 (ref 4.14–5.8)
WBC NRBC COR # BLD AUTO: 9.54 10*3/MM3 (ref 3.4–10.8)

## 2024-01-31 PROCEDURE — 25010000002 CEFTRIAXONE PER 250 MG: Performed by: STUDENT IN AN ORGANIZED HEALTH CARE EDUCATION/TRAINING PROGRAM

## 2024-01-31 PROCEDURE — 85025 COMPLETE CBC W/AUTO DIFF WBC: CPT | Performed by: STUDENT IN AN ORGANIZED HEALTH CARE EDUCATION/TRAINING PROGRAM

## 2024-01-31 PROCEDURE — 25010000002 ENOXAPARIN PER 10 MG: Performed by: HOSPITALIST

## 2024-01-31 PROCEDURE — 83735 ASSAY OF MAGNESIUM: CPT | Performed by: STUDENT IN AN ORGANIZED HEALTH CARE EDUCATION/TRAINING PROGRAM

## 2024-01-31 RX ADMIN — PANTOPRAZOLE SODIUM 40 MG: 40 TABLET, DELAYED RELEASE ORAL at 06:34

## 2024-01-31 RX ADMIN — PETROLATUM 1 APPLICATION: 420 OINTMENT TOPICAL at 08:46

## 2024-01-31 RX ADMIN — ENOXAPARIN SODIUM 30 MG: 100 INJECTION SUBCUTANEOUS at 08:45

## 2024-01-31 RX ADMIN — TAMSULOSIN HYDROCHLORIDE 0.4 MG: 0.4 CAPSULE ORAL at 20:05

## 2024-01-31 RX ADMIN — CEFTRIAXONE 2000 MG: 2 INJECTION, POWDER, FOR SOLUTION INTRAMUSCULAR; INTRAVENOUS at 20:04

## 2024-01-31 NOTE — PROGRESS NOTES
Nutrition Services    Patient Name:  Anthony Gallegos  YOB: 1945  MRN: [id number]  Admit Date:  1/21/2024    Assessment Date:  01/31/24    CLINICAL NUTRITION - PROGRESS NOTE       Reason for Encounter Follow-up         Nutrition Order Diet: Cardiac Diets; Healthy Heart (2-3 Na+); Texture: Soft to Chew (NDD 3); Soft to Chew: Ground Meat; Fluid Consistency: Thin (IDDSI 0)    Supplements/Snacks Boost Plus BID    EN/PN Order N/a         Pertinent Information Trying to eat more and drink ONS (when it is brought to him, having problems with this).  Eating % at meals.        Intervention/Plan Plans for dc soon..today or tmrw? Awaiting precert and transportation.     RD to follow up per protocol.    Electronically signed by:  Jewell Vera RD  01/31/24 14:04 EST

## 2024-01-31 NOTE — SIGNIFICANT NOTE
01/31/24 0827   Post Acute Pre-Cert Documentation   New Pre-Cert Needed? Yes   Post Acute Pre-Cert #2 Documentation   Request Submitted by Facility - Type: Hospital   Post-Acute Authorization Type Submitted: SNF   Date Post Acute Pre-Cert Inititated per Facility 01/31/24   Accepting Facility [facility] Discharge Date Requested 01/31/24   All Clinicals Submitted? Yes   Had Accepting Facility at Time of Submission Yes   Response Communicated to:    Authorization Number: PENDING [id number]   Post Acute Pre-Cert Initiated Comment ALIREZA REECE

## 2024-01-31 NOTE — PLAN OF CARE
Goal Outcome Evaluation:  Plan of Care Reviewed With: patient        Progress: improving  Outcome Evaluation: Patient A&O x 3.  Appeared to sleep the last half of the s hift.  Discharge to [facility] today and need to set up transportation.  VSS.  WCTM for t he rest of the shift.

## 2024-01-31 NOTE — CASE MANAGEMENT/SOCIAL WORK
Continued Stay Note  [facility]     Patient Name: Anthony Gallegos  MRN: [id number]  Today's Date: 1/31/2024    Admit Date: 1/21/2024    Plan: [facility] pending precert, will need transport   Discharge Plan       Row Name 01/31/24 1022       Plan    Plan [facility] pending precert, will need transport    Plan Comments Spoke [facility] pre-cert team, Pre-cert will need to be resubitted for auth due to SNF facility change. She will notify CCP once obtained. Pt will need transport to [place]. Packet in ccp office. Judith MOORE/CC                   Discharge Codes    No documentation.                 Expected Discharge Date and Time       Expected Discharge Date Expected Discharge Time    Jan 31, 2024               Martha Justice, RN

## 2024-01-31 NOTE — CASE MANAGEMENT/SOCIAL WORK
"Physicians Statement of Medical Necessity for  Ambulance Transportation    GENERAL INFORMATION     Name: Anthony Gallegos  YOB: 1945  Medicare #: SEE FACESHEET  Transport Date: _______ (Valid for round trips this date, or for scheduled repetitive trips for 60 days from the date signed below.)  Origin: [facility]  Destination: [facility]  Is the Patient's stay covered under Medicare Part A (PPS/DRG?)Yes  Closest appropriate facility? Yes  If this a hosp-hosp transfer? No  Is this a hospice patient? No    MEDICAL NECESSITY QUESTIONAIRE    Ambulance Transportation is medically necessary only if other means of transportation are contraindicated or would be potentially harmful to the patient.  To meet this requirement, the patient must be either \"bed confined\" or suffer from a condition such that transport by means other than an ambulance is contraindicated by the patient's condition.  The following questions must be answered by the healthcare professional signing below for this form to be valid:     1) Describe the MEDICAL CONDITION (physical and/or mental) of this patient AT THE TIME OF AMBULANCE TRANSPORT that requires the patient to be transported in an ambulance, and why transport by other means is contraindicated by the patient's condition:   Problems Addressed this Visit          Other    Altered mental status - Primary    RESOLVED: Leukocytosis     Diagnoses         Codes Comments    Altered mental status, unspecified altered mental status type    -  Primary ICD-10-CM: R41.82  ICD-9-CM: 780.97     Leukocytosis, unspecified type     ICD-10-CM: D72.829  ICD-9-CM: 288.60           Patient Active Problem List   Diagnosis    Ankylosing spondylitis of cervical region    Recent unexplained weight loss    Abnormal serum lipase level    Abnormal serum level of amylase    Hypertension    Boil    Immobility    Fall from bed    Tetrahydrocannabinol (THC) use disorder, mild, " "abuse    Generalized pain        Grief    DISH (disseminated idiopathic skeletal hyperostosis)    Generalized weakness    Severe malnutrition    Altered mental status    Transient alteration of awareness    GERD without esophagitis    BPH (benign prostatic hyperplasia)    UTI (urinary tract infection)    Dehydration    Hyperglycemia         Past Medical History:   Diagnosis Date    Abscess of scrotum     MARTITA (acute kidney injury)     Altered mental state     Arthritis     AS (ankylosing spondylitis)     Hypertension     Leukocytosis     Tetrahydrocannabinol (THC) use disorder, mild, abuse 12/20/2023    Uveitis       Past Surgical History:   Procedure Laterality Date    COLONOSCOPY      ROTATOR CUFF REPAIR Right     TOTAL HIP ARTHROPLASTY Left 2014      2) Is this patient \"bed confined\" as defined below?Yes   To be \"bed confined\" the patient must satisfy all three of the following criteria:  (1) unable to get up from bed without assistance; AND (2) unable to ambulate;  AND (3) unable to sit in a chair or wheelchair.  3) Can this patient safely be transported by car or wheelchair van (I.e., may safely sit during transport, without an attendant or monitoring?)No   4. In addition to completing questions 1-3 above, please check any of the following conditions that apply*:          *Note: supporting documentation for any boxes checked must be maintained in the patient's medical records Patient is confused, Medical attendant required, Special handling/isolation/infection control precautions required, Unable to tolerate seated position for time needed to transport, and Other ASSIST X2, FALLS RISK, GENERALIZED WEAKNSS, SAFETY PRECAUTIONS.       SIGNATURE OF PHYSICIAN OR OTHER AUTHORIZED HEALTHCARE PROFESSIONAL    I certify that the above information is true and correct based on my evaluation of this patient, and represent that the patient requires transport by ambulance and that other forms of transport are " contraindicated.  I understand that this information will be used by the Centers for Medicare and Medicaid Services (CMS) to support the determiniation of medical necessity for ambulance services, and I represent that I have personal knowledge of the patient's condition at the time of transport.       If this box is checked, I also certify that the patient is physically or mentally incapable of signing the ambulance service's claim form and that the institution with which I am affiliated has furnished care, services or assistance to the patient.  My signature below is made on behalf of the patient pursuant to 42 .36(b)(4). In accordance with 42 .37, the specific reason(s) that the patient is physically or mentally incapable of signing the claim for is as follows:     Signature of Physician or Healthcare Professional  Date/Time:   1/31/2024 15:50 EST      (For Scheduled repetitive transport, this form is not valid for transports performed more than 60 days after this date).              Martha Justice RN                                                                                                                                --------------------------------------------------------------------------------------------  Printed Name and Credentials of Physician or Authorized Healthcare Professional     *Form must be signed by patient's attending physician for scheduled, repetitive transports,.  For non-repetitive ambulance transports, if unable to obtain the signature of the attending physician, any of the following may sign (please select below):     Physician  Clinical Nurse Specialist  Registered Nurse     Physician Assistant  Discharge Planner  Licensed Practical Nurse     Nurse Practitioner X

## 2024-01-31 NOTE — CASE MANAGEMENT/SOCIAL WORK
Continued Stay Note  [facility]     Patient Name: Anthony Gallegos  MRN: [id number]  Today's Date: 1/31/2024    Admit Date: 1/21/2024    Plan: [facility] pending pre-cert via ems   Discharge Plan       Row Name 01/31/24 1440       Plan    Plan [facility] pending pre-cert via ems    Patient/Family in Agreement with Plan yes    Plan Comments Precert remains pending. CCP spoke with dtr via inbound call with update. [facility] ems arranged for tomorrow at 8pm (next available). Await pre-cert. Packet in ccp office. tirso rnccp                   Discharge Codes    No documentation.                 Expected Discharge Date and Time       Expected Discharge Date Expected Discharge Time    Feb 1, 2024               Martha Justice RN

## 2024-01-31 NOTE — PROGRESS NOTES
Name: Anthony Gallegos ADMIT: 2024   : 1945  PCP: Provider, No Known    MRN: [id number] LOS: 9 days   AGE/SEX: 78 y.o. male  ROOM: [facility]     Subjective   Subjective   No medical complaints. Asking about discharge       Objective   Objective   Vital Signs  Temp:  [98.2 °F (36.8 °C)-99.1 °F (37.3 °C)] 98.2 °F (36.8 °C)  Heart Rate:  [77-91] 78  Resp:  [16-18] 16  BP: (109-125)/(53-62) 118/53  SpO2:  [98 %-99 %] 99 %  on   ;   Device (Oxygen Therapy): room air  Body mass index is 21.77 kg/m².  Physical Exam  Vitals and nursing note reviewed.   Constitutional:       General: He is not in acute distress.     Appearance: He is ill-appearing. He is not toxic-appearing.   HENT:      Head: Normocephalic.      Mouth/Throat:      Mouth: Mucous membranes are moist.   Eyes:      Conjunctiva/sclera: Conjunctivae normal.   Cardiovascular:      Rate and Rhythm: Normal rate and regular rhythm.   Pulmonary:      Effort: Pulmonary effort is normal. No respiratory distress.      Breath sounds: No wheezing or rales.   Abdominal:      General: Bowel sounds are normal.      Palpations: Abdomen is soft.   Musculoskeletal:      Cervical back: Neck supple.      Right lower leg: No edema.      Left lower leg: No edema.   Skin:     General: Skin is warm and dry.   Neurological:      Mental Status: He is alert. Mental status is at baseline.   Psychiatric:         Mood and Affect: Mood normal.         Behavior: Behavior normal.       Results Review     I reviewed the patient's new clinical results.  Results from last 7 days   Lab Units 24  0439 24  0523 24  0437 24  0443   WBC 10*3/mm3 9.54 10.67 8.35 8.74   HEMOGLOBIN g/dL 9.2* 10.3* 10.2* 9.8*   PLATELETS 10*3/mm3 438 432 364 346     Results from last 7 days   Lab Units 24  0437 24  0443 24  1738 24  0504 24  0514   SODIUM mmol/L 135* 135*  --  134* 137   POTASSIUM mmol/L 4.0 3.9 4.0 3.5 3.6   CHLORIDE mmol/L 102 105  --   "104 107   CO2 mmol/L 24.6 22.0  --  22.5 19.7*   BUN mg/dL 13 16  --  22 32*   CREATININE mg/dL 0.66* 0.79  --  0.98 1.50*   GLUCOSE mg/dL 81 89  --  89 95   EGFR mL/min/1.73 95.4 90.4  --  78.4 47.1*     Results from last 7 days   Lab Units 01/29/24  0437 01/27/24  0504 01/26/24  0514 01/25/24  0725   ALBUMIN g/dL 2.1* 2.0* 2.2* 2.3*     Results from last 7 days   Lab Units 01/31/24  0440 01/30/24  0523 01/29/24  0437 01/28/24 0443 01/27/24  1738 01/27/24  0504 01/26/24  0514 01/25/24  0725   CALCIUM mg/dL  --   --  7.8* 7.6*  --  7.3* 7.5* 7.4*   ALBUMIN g/dL  --   --  2.1*  --   --  2.0* 2.2* 2.3*   MAGNESIUM mg/dL 2.0 1.9 1.8 2.2  --  2.2 2.3 2.2   PHOSPHORUS mg/dL  --   --  2.5  --  2.9 1.9* 1.6* 2.6       No results found for: \"HGBA1C\", \"POCGLU\"    No radiology results for the last day    I have personally reviewed all medications:  Scheduled Medications  cefTRIAXone, 2,000 mg, Intravenous, Q24H  enoxaparin, 30 mg, Subcutaneous, Q24H  pantoprazole, 40 mg, Oral, Q AM  Petrolatum, 1 Application, Topical, Q24H  potassium & sodium phosphates, 2 packet, Oral, Once  senna-docusate sodium, 2 tablet, Oral, BID  tamsulosin, 0.4 mg, Oral, Nightly    Infusions   Diet  Diet: Cardiac Diets; Healthy Heart (2-3 Na+); Texture: Soft to Chew (NDD 3); Soft to Chew: Ground Meat; Fluid Consistency: Thin (IDDSI 0)    I have personally reviewed:  [x]  Laboratory   [x]  Microbiology   [x]  Radiology   [x]  EKG/Telemetry  [x]  Cardiology/Vascular   []  Pathology    []  Records       Assessment/Plan     Active Hospital Problems    Diagnosis  POA    **Transient alteration of awareness [R40.4]  Yes    GERD without esophagitis [K21.9]  Yes    BPH (benign prostatic hyperplasia) [N40.0]  Yes    UTI (urinary tract infection) [N39.0]  Yes    Dehydration [E86.0]  Yes    Hyperglycemia [R73.9]  Yes    Altered mental status [R41.82]  Yes    Generalized weakness [R53.1]  Yes    DISH (disseminated idiopathic skeletal hyperostosis) [M48.10]  Yes    " Hypertension [I10]  Yes      Resolved Hospital Problems    Diagnosis Date Resolved POA    Leukocytosis [D72.829] 01/30/2024 Yes    MARTITA (acute kidney injury) [N17.9] 01/30/2024 Yes    Altered mental state [R41.82] 01/30/2024 Yes       78 y.o. male admitted with Transient alteration of awareness.    Mr. Gallegos is a 78 y.o. male with a history of DISH, hypertension, ankylosing spondylitis and impaired mobility who presented to [facility] initially complaining of confusion and was found to have urinary tract infection and admitted to monitored unit.     -Urinary tract infection: CT scan of the abdomen and pelvis showed continued bladder distention and some evidence of possible pyelonephritis with hydronephrosis and stranding.  Urine culture now growing Klebsiella.  Has been afebrile for greater than 24 hours.  Continue ceftriaxone 2 g every 24 hours x 10 days to cover for pyelonephritis.  Can transition to oral antibiotics at discharge; plan to switch to augmentin based on susceptibility     -Right knee pain: Patient complaining of right knee pain.  Swelling and tenderness on exam.  Uric acid negative.  Normal WBC.  XR with possible effusion, no fracture or dislocation.  S/P steroid injection; no restrictions, okay to ambulate with PT     -Urinary retention: Mariscal catheter was placed and urology was consulted.  Urine continues to be blood-tinged.  If the Mariscal continues to clog, may need to place larger urinary catheter.  Patient going to be discharged with Mariscal in place.  Urology has now signed off. Follow up in 2 weeks     -Dysphagia: SLP evaluated.  Recommending soft, ground meats with thin liquids.  Meds whole in puréed.  No straws.  Continuing to follow.     -Leukocytosis: WBC has now normalized.  No obvious new source of infection.  Clinically patient appears to be improving, has been afebrile for over 24 hours.  Blood cultures remain no growth to date.     -Acute renal failure, electrolyte  derangements: Creatinine normal today.  Baseline is normal.  Corrected calcium has been normal.  Avoid nephrotoxic agents including NSAIDs and contrast dyes.  Electrolyte replacement protocol.  Monitor with daily BMP.  Nephrology consulted and following.  Appreciate their assistance.     -Anemia: Hgb improved and stable, baseline appears around 12.  No signs of active bleeding. Monitor with daily CBC and transfuse for Hgb less than 7.      Lovenox 30 mg SC daily for DVT prophylaxis.  Full code.  Discussed with patient and CCP.  Anticipate discharge to SNU facility once precert has been obtained.      NOHEMI Anand  [facility]  01/31/24  13:39 EST

## 2024-02-01 NOTE — CASE MANAGEMENT/SOCIAL WORK
Case Management Discharge Note      Final Note: [place] [facility] via [facility] ems. tirso rn/ccp         Selected Continued Care - Discharged on 1/31/2024 Admission date: 1/21/2024 - Discharge disposition: Skilled Nursing Facility (DC - External)      Destination Coordination complete.      Service Provider Selected Services Address Phone Fax Patient Preferred    [facility] [address] [address] [phone] --              Durable Medical Equipment    No services have been selected for the patient.                Dialysis/Infusion    No services have been selected for the patient.                Home Medical Care    No services have been selected for the patient.                Therapy    No services have been selected for the patient.                Community Resources    No services have been selected for the patient.                Community & DME    No services have been selected for the patient.                    Selected Continued Care - Prior Encounters Includes continued care and service providers with selected services from prior encounters from 10/23/2023 to 1/31/2024      Discharged on 1/8/2024 Admission date: 12/20/2023 - Discharge disposition: Skilled Nursing Facility (DC - External)      Destination       Service Provider Selected Services Address Phone Fax Patient Preferred    [facility] [address] [address] [phone] --                          Transportation Services  Ambulance: [facility]    Final Discharge Disposition Code: 03 - skilled nursing facility (SNF)

## 2024-02-01 NOTE — NURSING NOTE
Patient discharged to [facility] care  IV pulled out   Patient going with his forde and belongings including cell phone and   AOX4, Vital signs WNL

## 2024-02-01 NOTE — SIGNIFICANT NOTE
02/01/24 0857   Post Acute Pre-Cert #2 Documentation   Request Submitted by Facility - Type: Hospital   Post-Acute Authorization Type Submitted: SNF   Date Post Acute Pre-Cert Inititated per Facility 01/31/24   Date Post Acute Pre-Cert Completed 01/31/24   Accepting Facility [facility] Discharge Date Requested 01/31/24   All Clinicals Submitted? Yes   Had Accepting Facility at Time of Submission Yes   Response Received from Insurance? Approval   Response Communicated to: ;Accepting Facility Liaison;Accepting Facility Auth Department   Authorization Number: [id number]   Post Acute Pre-Cert Initiated Comment ALIREZA REECE

## 2024-02-01 NOTE — PLAN OF CARE
Goal Outcome Evaluation:                      EMS arranged for transport at 10:00 to [facility].

## 2024-02-02 NOTE — PAYOR COMM NOTE
"Anthony Gallegos (78 y.o. Male)     PLEASE SEE ATTACHED FOR DC NOTICE    REF #   [id number]     THANK YOU  CATHIE DONALD RN/ DEPT  [facility]   [phone]  [phone]        Date of Birth   1945    Social Security Number       Address   [address] [place] [address]  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)   Code Status: Prior    Ht: 190.5 cm (75\")   Wt: 79 kg (174 lb 3.2 oz)    Admission Cmt: None   Principal Problem: Transient alteration of awareness [R40.4]                   Active Insurance as of 1/21/2024       Primary Coverage       Payor Plan Insurance Group Employer/Plan Group    AETNA MEDICARE REPLACEMENT AETNA MED ADV POS [id number]       Payor Plan Address Payor Plan Phone Number Payor Plan Fax Number Effective Dates    [address] [phone]  12/1/2023 - None Entered    [facility] [id number]         Subscriber Name Subscriber Birth Date Member ID       ANTHONY GALLEGOS 1945 [id number]                     Emergency Contacts        (Rel.) Home Phone Work Phone Mobile Phone    NATHALIE DON (Daughter) [phone] -- --    MAGY GALLEGOS [phone] -- --              [place]: NPI [id number]  Tax ID [id number]     Discharge Summary        Lakesha Sykes APRN at 01/31/24 1504       Attestation signed by Mireya Basilio MD at 02/01/24 1558    I have reviewed this " documentation and agree.    Mireya Basilio MD  [facility]                       Patient Name: Anthony Gallegos  : 1945  MRN: [id number]    Date of Admission: 2024  Date of Discharge:  2024  Primary Care Physician: Provider, No Known      Chief Complaint:   Dysuria and Altered Mental Status      Discharge Diagnoses     Active Hospital Problems    Diagnosis  POA    **Transient alteration of awareness [R40.4]  Yes    GERD without esophagitis [K21.9]  Yes    BPH (benign prostatic hyperplasia) [N40.0]  Yes    UTI (urinary tract infection) [N39.0]  Yes    Dehydration [E86.0]  Yes    Hyperglycemia [R73.9]  Yes    Altered mental status [R41.82]  Yes    Generalized weakness [R53.1]  Yes    DISH (disseminated idiopathic skeletal hyperostosis) [M48.10]  Yes    Hypertension [I10]  Yes      Resolved Hospital Problems    Diagnosis Date Resolved POA    Leukocytosis [D72.829] 2024 Yes    MARTITA (acute kidney injury) [N17.9] 2024 Yes    Altered mental state [R41.82] 2024 Yes        Hospital Course     Mr. Gallegos is a 78 y.o. male with a history of HTN, BPH who presented to [facility] initially complaining of worsening confusion.  Please see the admitting history and physical for further details.  He was found to have UTI/pyelonephritis, MARTITA and was admitted to the hospital for further evaluation and treatment.  Started on IV fluids. Nephrology and Urology were consulted. Atenolol has been stopped due to hypotension; BP has been acceptable. Urine culture grew klebsiella. BC remained no growth. Recommend to continue augmentin to complete 10 day course treatment. Nephrology is recommending outpatient follow up in 2-3 weeks for further evaluation of proteinuria. Cr has normalized. He has required indwelling catheter placement due to retention/BPH. He will be discharged with catheter and follow up with Urology in 2 weeks. Ortho has also seen for knee pain, possible  effusion. Underwent steroid injection rt knee on 1/30/24; no restrictions per Ortho, he may ambulate w/PT. Stable from medical standpoint for discharge to SNF.  Day of Discharge     Subjective:  No complaints. Feels well. Agrees with discharge to SNF today    Physical Exam:  Temp:  [98.2 °F (36.8 °C)-99.1 °F (37.3 °C)] 98.2 °F (36.8 °C)  Heart Rate:  [77-91] 78  Resp:  [16-18] 16  BP: (109-125)/(53-62) 118/53  Body mass index is 21.77 kg/m².  Physical Exam  Vitals and nursing note reviewed.   Constitutional:       General: He is not in acute distress.     Appearance: He is ill-appearing. He is not toxic-appearing.   HENT:      Head: Normocephalic.      Mouth/Throat:      Mouth: Mucous membranes are moist.   Eyes:      Conjunctiva/sclera: Conjunctivae normal.   Cardiovascular:      Rate and Rhythm: Normal rate and regular rhythm.   Pulmonary:      Effort: Pulmonary effort is normal. No respiratory distress.      Breath sounds: No wheezing or rales.   Abdominal:      General: Bowel sounds are normal.      Palpations: Abdomen is soft.   Musculoskeletal:      Cervical back: Neck supple.      Right lower leg: No edema.      Left lower leg: No edema.   Skin:     General: Skin is warm and dry.   Neurological:      Mental Status: He is alert. Mental status is at baseline.   Psychiatric:         Mood and Affect: Mood normal.         Behavior: Behavior normal.   Consultants     Consult Orders (all) (From admission, onward)       Start     Ordered    01/28/24 1248  Inpatient Orthopedic Surgery Consult  Once        Specialty:  Orthopedic Surgery  Provider:  Bryce Salvador MD    01/28/24 1247    01/23/24 1237  Inpatient Urology Consult  Once        Specialty:  Urology  Provider:  Khadar Lynne Jr., MD    01/23/24 1236    01/23/24 0845  Inpatient Nephrology Consult  Once        Specialty:  Nephrology  Provider:  Thompson Novoa MD    01/23/24 0845                  Procedures     * Surgery not found *    Imaging Results  (All)       Procedure Component Value Units Date/Time    XR Knee 1 or 2 View Right [[id number]] Collected: 01/28/24 1422     Updated: 01/28/24 1430    Narrative:      RIGHT KNEE: AP, OBLIQUE LATERAL.     HISTORY: Knee pain and swelling.     FINDINGS: Exam is somewhat limited due to patient difficulty moving the  knee. There is osteoarthritis with tricompartmental joint space  narrowing and marginal spur formation. Degenerative subchondral cyst are  present within the patella. There is a knee joint effusion. No fracture  or acute osseous abnormality is evident. Vascular calcifications are  present.       Impression:      Limited exam demonstrates a knee joint effusion. 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 No evidence for fracture or acute osseous  abnormality. There are atherosclerotic calcifications.     This report was finalized on 1/28/2024 2:27 PM by Dr. Bobby Eason M.D on Workstation: [id number]       US Renal Bilateral [[id number]] Collected: 01/25/24 0533     Updated: 01/25/24 0538    Narrative:      RENAL ULTRASOUND     HISTORY: Hydronephrosis     COMPARISON: January 23, 2024     TECHNIQUE: Grayscale and color Doppler sonographic images were obtained  through the kidneys and bladder.     FINDINGS:  Right kidney measures 12.3 x 5.1 x 5.2 cm. Left kidney measures 11.4 x  7.0 x 5.9 cm. No hydronephrosis is seen on either side. The patient does  have a simple appearing right renal cyst. No additional follow-up is  necessary. Renal echotexture appears normal. The patient has a Mariscal  catheter within the urinary bladder. Patient is noted to have a thick  walled appearance to the bladder, as well as debris within the urinary  bladder, suggesting cystitis. Bladder diverticulum is noted.       Impression:         1. No hydronephrosis seen on the current study.  2. Thick-walled appearance to the urinary bladder, as well as debris  within the bladder, suggesting cystitis.     This report was finalized on  1/25/2024 5:35 AM by Dr. Kirsten Peña M.D on Workstation: [id number]       CT Abdomen Pelvis Without Contrast [[id number]] Collected: 01/23/24 0959     Updated: 01/23/24 1005    Narrative:      CT ABDOMEN AND PELVIS WITHOUT IV CONTRAST     HISTORY: Pyelonephritis versus perinephric abscess.     TECHNIQUE:  CT includes axial imaging from the lung bases to the  trochanters without intravenous contrast and without use of oral  contrast. Data reconstructed in coronal and sagittal planes. Radiation  dose reduction techniques were utilized, including automated exposure  control and exposure modulation based on body size.     COMPARISON: CT abdomen and pelvis 12/20/2023.     FINDINGS: There is mild linear subsegmental basal atelectasis. Liver,  spleen, pancreas appear grossly normal allowing for limitation without  IV contrast and due to streak artifact from patient being scanned with  his arms to his side. There is mild bilateral adrenal thickening,  greater on the left without change.     A right lower pole renal exophytic low-density lesion is most likely a  cyst and measures 5.6 cm and this was also present on the previous exam  12/20/2023. There is very mild hydronephrosis that is greater on the  right and new when compared to the prior exam. Mild hydroureter is  present to the level of the urinary bladder. There is general bladder  distention with generalized urinary bladder wall thickening consistent  with cystitis. Widemouth anterior bladder dome diverticulum is present  just posterior to the level of umbilicus. Pericystic stranding is  present.     There is no bowel dilatation or evidence for bowel obstruction. Small  periumbilical hernia contains fat.     There is syndesmophyte formation within the lower thoracic spine and  lumbar spine with bony fusion of the vertebral bodies. There is also  bony fusion of the spinous processes, facet joints and there is  ankylosis of the sacroiliac joints.     Left total  hip arthroplasty is present associated with beam-hardening  artifact. There is mild generalized body wall edema.        Impression:      1. Generalized urinary bladder wall distention with wall thickening  consistent with cystitis and urinary retention. Anterior bladder dome  diverticulum. Mild hydronephrosis, greater on the right, potential  pyelonephritis.  2. 5.6 cm right lower pole exophytic low-density lesion is consistent  with a cyst and is without change.  3. Mild generalized body wall edema.  4. Small periumbilical hernia contains fat.  5. Osseous findings suggest ankylosing spondylitis.     Radiation dose reduction techniques were utilized, including automated  exposure control and exposure modulation based on body size.        This report was finalized on 1/23/2024 10:02 AM by Dr. Bobby Eason M.D on Workstation: [id number]       XR Chest 1 View [[id number]] Collected: 01/21/24 1859     Updated: 01/21/24 1902    Narrative:      PORTABLE CHEST X-RAY     HISTORY: Altered mental status.     Portable chest x-ray is provided. Correlation: December 20, 2023 and  June 21, 2017.     FINDINGS: The cardiomediastinal silhouette is normal. The lungs are  clear. The costophrenic sulci are dry and the bones appear normal. There  is no pneumothorax.       Impression:      Negative.     This report was finalized on 1/21/2024 6:59 PM by Dr. Walter Henderson M.D on Workstation: [id number]       CT Head Without Contrast [[id number]] Collected: 01/21/24 1853     Updated: 01/21/24 1859    Narrative:      HEAD CT     HISTORY: Confusion.     TECHNIQUE: Noncontrast head CT is provided. Comparison: Head CT December 20, 2023.     FINDINGS: The ventricles are normal in caliber. There is loss of  cerebral parenchymal volume with patchy low density in the frontal white  matter similar as observed previously. No new parenchymal abnormality is  present. There is no hemorrhage or acute ischemic change. The bones of  the skull appear  normal. The mastoid air cells, middle ears, and  visualized paranasal sinuses are clear. Extensive atheromatous vascular  calcification       Impression:      Chronic changes in the brain. No acute abnormality.        Radiation dose reduction techniques were utilized, including automated  exposure control and exposure modulation based on body size.        This report was finalized on 1/21/2024 6:56 PM by Dr. Walter Henderson M.D on Workstation: [id number]             Results for orders placed during the hospital encounter of 12/20/23    Duplex Venous Lower Extremity - Left CAR    Interpretation Summary    Normal left lower extremity venous duplex scan.      Pertinent Labs     Results from last 7 days   Lab Units 01/31/24  0439 01/30/24  0523 01/29/24  0437 01/28/24  0443   WBC 10*3/mm3 9.54 10.67 8.35 8.74   HEMOGLOBIN g/dL 9.2* 10.3* 10.2* 9.8*   PLATELETS 10*3/mm3 438 432 364 346     Results from last 7 days   Lab Units 01/29/24  0437 01/28/24 0443 01/27/24  1738 01/27/24  0504 01/26/24  0514   SODIUM mmol/L 135* 135*  --  134* 137   POTASSIUM mmol/L 4.0 3.9 4.0 3.5 3.6   CHLORIDE mmol/L 102 105  --  104 107   CO2 mmol/L 24.6 22.0  --  22.5 19.7*   BUN mg/dL 13 16  --  22 32*   CREATININE mg/dL 0.66* 0.79  --  0.98 1.50*   GLUCOSE mg/dL 81 89  --  89 95   EGFR mL/min/1.73 95.4 90.4  --  78.4 47.1*     Results from last 7 days   Lab Units 01/29/24  0437 01/27/24  0504 01/26/24  0514 01/25/24  0725   ALBUMIN g/dL 2.1* 2.0* 2.2* 2.3*     Results from last 7 days   Lab Units 01/31/24  0440 01/30/24  0523 01/29/24  0437 01/28/24  0443 01/27/24  1738 01/27/24  0504 01/26/24  0514 01/25/24  0725   CALCIUM mg/dL  --   --  7.8* 7.6*  --  7.3* 7.5* 7.4*   ALBUMIN g/dL  --   --  2.1*  --   --  2.0* 2.2* 2.3*   MAGNESIUM mg/dL 2.0 1.9 1.8 2.2  --  2.2 2.3 2.2   PHOSPHORUS mg/dL  --   --  2.5  --  2.9 1.9* 1.6* 2.6         Results from last 7 days   Lab Units 01/28/24  0443 01/27/24  0504 01/26/24  1651   CREATININE UR mg/dL   "--   --  68.9   PROTEIN TOTAL URINE mg/dL  --   --  260.7   URIC ACID mg/dL 4.2   < >  --    PROT/CREAT RATIO UR mg/G Crea  --   --  3,783.7*    < > = values in this interval not displayed.         Invalid input(s): \"LDLCALC\"          Test Results Pending at Discharge       Discharge Details        Discharge Medications        New Medications        Instructions Start Date   acetaminophen 325 MG tablet  Commonly known as: TYLENOL   650 mg, Oral, Every 4 Hours PRN      amoxicillin-clavulanate 875-125 MG per tablet  Commonly known as: AUGMENTIN   1 tablet, Oral, 2 Times Daily      melatonin 3 MG tablet   3 mg, Oral, Nightly PRN             Continue These Medications        Instructions Start Date   methotrexate 2.5 MG tablet   Take 1 tablet by mouth 2 (Two) Times a Week. Pt takes every Wednesday and thursday      OMEGA 3 PO   1 capsule, Oral, Daily      pantoprazole 40 MG EC tablet  Commonly known as: PROTONIX   40 mg, Oral, Every Early Morning      tamsulosin 0.4 MG capsule 24 hr capsule  Commonly known as: FLOMAX   0.4 mg, Oral, Nightly             Stop These Medications      atenolol 50 MG tablet  Commonly known as: TENORMIN     esomeprazole 20 MG capsule  Commonly known as: nexIUM     Lidocaine 4 %     muscle rub 10-15 % cream cream     Naproxen Sodium 220 MG capsule              No Known Allergies    Discharge Disposition:  Skilled Nursing Facility (DC - External)      Discharge Diet:  Diet Order   Procedures    Diet: Cardiac Diets; Healthy Heart (2-3 Na+); Texture: Soft to Chew (NDD 3); Soft to Chew: Ground Meat; Fluid Consistency: Thin (IDDSI 0)       Discharge Activity:   Activity Instructions       Activity as Tolerated              CODE STATUS:    Code Status and Medical Interventions:   Ordered at: 01/21/24 2052     Code Status (Patient has no pulse and is not breathing):    CPR (Attempt to Resuscitate)     Medical Interventions (Patient has pulse or is breathing):    Full       No future appointments.   " Contact information for follow-up providers       Provider, No Known .    Why: Follow up with PCP 1 week after discharge from rehab  Contact information:  [facility] [address]  [phone]               Thompson Novoa MD. Schedule an appointment as soon as possible for a visit in 3 week(s).    Specialty: Nephrology  Contact information:  [address]  [address]  [facility] [id number] [phone]               Shaylee Rodriguez APRN. Schedule an appointment as soon as possible for a visit in 2 week(s).    Specialty: Urology  Contact information:  [address]  [address]  [facility] [address]  [phone]                       Contact information for after-discharge care       Destination       [facility] .    Service: Skilled Nursing  Contact information:  [address] [facility] [address] [phone]                                   Time Spent on Discharge:  Greater than 30 minutes      NOHEMI Anand  [facility]  01/31/24  15:04 EST                Electronically signed by Mireya Narvaez MD at 02/01/24 1558       Discharge Order (From admission, onward)       Start     Ordered    01/31/24 1552  Discharge patient  Once        Expected Discharge Date: 01/31/24   Expected Discharge Time: Evening   Discharge Disposition: Skilled Nursing Facility (DC - External)   Physician of Record for Attribution - Please select from Treatment Team: MIREYA NARVAEZ [[id number]]   Review needed by CMO to determine Physician of Record: No      Question Answer Comment   Physician of Record for Attribution - Please select from Treatment Team MIREYA NARVAEZ    Review needed by CMO to determine Physician of Record No        01/31/24 1551    01/30/24 1237  Discharge patient  Once,   Status:  Canceled        Expected Discharge Date: 01/30/24   Expected Discharge Time: Afternoon   Discharge Disposition: Skilled Nursing Facility (DC - External)    Physician of Record for Attribution - Please select from Treatment Team: BJ NARVAEZ [[id number]]   Review needed by CMO to determine Physician of Record: No      Question Answer Comment   Physician of Record for Attribution - Please select from Treatment Team BJ NARVAEZ    Review needed by CMO to determine Physician of Record No        01/30/24 6407

## 2024-03-25 ENCOUNTER — APPOINTMENT (OUTPATIENT)
Dept: GENERAL RADIOLOGY | Facility: HOSPITAL | Age: 79
DRG: 689 | End: 2024-03-25
Payer: MEDICARE

## 2024-03-25 ENCOUNTER — APPOINTMENT (OUTPATIENT)
Dept: CT IMAGING | Facility: HOSPITAL | Age: 79
DRG: 689 | End: 2024-03-25
Payer: MEDICARE

## 2024-03-25 ENCOUNTER — HOSPITAL ENCOUNTER (INPATIENT)
Facility: HOSPITAL | Age: 79
LOS: 15 days | Discharge: NURSING, REHAB OR LONG-TERM CARE | DRG: 689 | End: 2024-04-09
Attending: EMERGENCY MEDICINE | Admitting: INTERNAL MEDICINE
Payer: MEDICARE

## 2024-03-25 DIAGNOSIS — R63.8 DECREASED ORAL INTAKE: Primary | ICD-10-CM

## 2024-03-25 DIAGNOSIS — R62.7 FAILURE TO THRIVE IN ADULT: ICD-10-CM

## 2024-03-25 DIAGNOSIS — R53.1 GENERALIZED WEAKNESS: ICD-10-CM

## 2024-03-25 DIAGNOSIS — N17.9 AKI (ACUTE KIDNEY INJURY): ICD-10-CM

## 2024-03-25 DIAGNOSIS — R52 WHOLE BODY PAIN: ICD-10-CM

## 2024-03-25 DIAGNOSIS — R13.10 DYSPHAGIA, UNSPECIFIED TYPE: ICD-10-CM

## 2024-03-25 DIAGNOSIS — E86.0 DEHYDRATION: ICD-10-CM

## 2024-03-25 DIAGNOSIS — N39.0 URINARY TRACT INFECTION IN MALE: ICD-10-CM

## 2024-03-25 DIAGNOSIS — R79.89 ELEVATED TROPONIN: ICD-10-CM

## 2024-03-25 DIAGNOSIS — R63.4 WEIGHT LOSS: ICD-10-CM

## 2024-03-25 LAB
ALBUMIN SERPL-MCNC: 3 G/DL (ref 3.5–5.2)
ALBUMIN/GLOB SERPL: 0.5 G/DL
ALP SERPL-CCNC: 112 U/L (ref 39–117)
ALT SERPL W P-5'-P-CCNC: 15 U/L (ref 1–41)
ANION GAP SERPL CALCULATED.3IONS-SCNC: 15 MMOL/L (ref 5–15)
AST SERPL-CCNC: 16 U/L (ref 1–40)
B PARAPERT DNA SPEC QL NAA+PROBE: NOT DETECTED
B PERT DNA SPEC QL NAA+PROBE: NOT DETECTED
BACTERIA UR QL AUTO: ABNORMAL /HPF
BILIRUB SERPL-MCNC: 0.6 MG/DL (ref 0–1.2)
BILIRUB UR QL STRIP: NEGATIVE
BUN SERPL-MCNC: 82 MG/DL (ref 8–23)
BUN/CREAT SERPL: 32.8 (ref 7–25)
C PNEUM DNA NPH QL NAA+NON-PROBE: NOT DETECTED
CALCIUM SPEC-SCNC: 9.7 MG/DL (ref 8.6–10.5)
CHLORIDE SERPL-SCNC: 101 MMOL/L (ref 98–107)
CK SERPL-CCNC: 29 U/L (ref 20–200)
CLARITY UR: ABNORMAL
CO2 SERPL-SCNC: 24 MMOL/L (ref 22–29)
COLOR UR: YELLOW
CREAT SERPL-MCNC: 2.5 MG/DL (ref 0.76–1.27)
D-LACTATE SERPL-SCNC: 1.8 MMOL/L (ref 0.5–2)
D-LACTATE SERPL-SCNC: 2.1 MMOL/L (ref 0.5–2)
D-LACTATE SERPL-SCNC: 2.4 MMOL/L (ref 0.5–2)
DEPRECATED RDW RBC AUTO: 41.3 FL (ref 37–54)
EGFRCR SERPLBLD CKD-EPI 2021: 25.3 ML/MIN/1.73
ERYTHROCYTE [DISTWIDTH] IN BLOOD BY AUTOMATED COUNT: 13.1 % (ref 12.3–15.4)
FLUAV SUBTYP SPEC NAA+PROBE: NOT DETECTED
FLUBV RNA ISLT QL NAA+PROBE: NOT DETECTED
GEN 5 2HR TROPONIN T REFLEX: 45 NG/L
GLOBULIN UR ELPH-MCNC: 6.5 GM/DL
GLUCOSE SERPL-MCNC: 125 MG/DL (ref 65–99)
GLUCOSE UR STRIP-MCNC: NEGATIVE MG/DL
HADV DNA SPEC NAA+PROBE: NOT DETECTED
HCOV 229E RNA SPEC QL NAA+PROBE: NOT DETECTED
HCOV HKU1 RNA SPEC QL NAA+PROBE: NOT DETECTED
HCOV NL63 RNA SPEC QL NAA+PROBE: NOT DETECTED
HCOV OC43 RNA SPEC QL NAA+PROBE: NOT DETECTED
HCT VFR BLD AUTO: 41.3 % (ref 37.5–51)
HGB BLD-MCNC: 13.2 G/DL (ref 13–17.7)
HGB UR QL STRIP.AUTO: ABNORMAL
HMPV RNA NPH QL NAA+NON-PROBE: NOT DETECTED
HOLD SPECIMEN: NORMAL
HOLD SPECIMEN: NORMAL
HPIV1 RNA ISLT QL NAA+PROBE: NOT DETECTED
HPIV2 RNA SPEC QL NAA+PROBE: NOT DETECTED
HPIV3 RNA NPH QL NAA+PROBE: NOT DETECTED
HPIV4 P GENE NPH QL NAA+PROBE: NOT DETECTED
HYALINE CASTS UR QL AUTO: ABNORMAL /LPF
KETONES UR QL STRIP: NEGATIVE
LEUKOCYTE ESTERASE UR QL STRIP.AUTO: ABNORMAL
LIPASE SERPL-CCNC: 62 U/L (ref 13–60)
LYMPHOCYTES # BLD MANUAL: 1.69 10*3/MM3 (ref 0.7–3.1)
LYMPHOCYTES NFR BLD MANUAL: 12.2 % (ref 5–12)
M PNEUMO IGG SER IA-ACNC: NOT DETECTED
MAGNESIUM SERPL-MCNC: 2.6 MG/DL (ref 1.6–2.4)
MCH RBC QN AUTO: 27.6 PG (ref 26.6–33)
MCHC RBC AUTO-ENTMCNC: 32 G/DL (ref 31.5–35.7)
MCV RBC AUTO: 86.2 FL (ref 79–97)
MONOCYTES # BLD: 1.45 10*3/MM3 (ref 0.1–0.9)
NEUTROPHILS # BLD AUTO: 8.71 10*3/MM3 (ref 1.7–7)
NEUTROPHILS NFR BLD MANUAL: 73.5 % (ref 42.7–76)
NITRITE UR QL STRIP: NEGATIVE
NT-PROBNP SERPL-MCNC: 783 PG/ML (ref 0–1800)
PH UR STRIP.AUTO: 7 [PH] (ref 5–8)
PLAT MORPH BLD: NORMAL
PLATELET # BLD AUTO: 471 10*3/MM3 (ref 140–450)
PMV BLD AUTO: 9.4 FL (ref 6–12)
POTASSIUM SERPL-SCNC: 4.9 MMOL/L (ref 3.5–5.2)
PROCALCITONIN SERPL-MCNC: 3.36 NG/ML (ref 0–0.25)
PROT SERPL-MCNC: 9.5 G/DL (ref 6–8.5)
PROT UR QL STRIP: ABNORMAL
RBC # BLD AUTO: 4.79 10*6/MM3 (ref 4.14–5.8)
RBC # UR STRIP: ABNORMAL /HPF
RBC MORPH BLD: NORMAL
REF LAB TEST METHOD: ABNORMAL
RHINOVIRUS RNA SPEC NAA+PROBE: NOT DETECTED
RSV RNA NPH QL NAA+NON-PROBE: NOT DETECTED
SARS-COV-2 RNA NPH QL NAA+NON-PROBE: NOT DETECTED
SODIUM SERPL-SCNC: 140 MMOL/L (ref 136–145)
SP GR UR STRIP: 1.01 (ref 1–1.03)
SQUAMOUS #/AREA URNS HPF: ABNORMAL /HPF
TROPONIN T DELTA: -6 NG/L
TROPONIN T SERPL HS-MCNC: 51 NG/L
TSH SERPL DL<=0.05 MIU/L-ACNC: 2.19 UIU/ML (ref 0.27–4.2)
UROBILINOGEN UR QL STRIP: ABNORMAL
VARIANT LYMPHS NFR BLD MANUAL: 14.3 % (ref 19.6–45.3)
WBC # UR STRIP: ABNORMAL /HPF
WBC MORPH BLD: NORMAL
WBC NRBC COR # BLD AUTO: 11.85 10*3/MM3 (ref 3.4–10.8)
WHOLE BLOOD HOLD COAG: NORMAL
WHOLE BLOOD HOLD SPECIMEN: NORMAL

## 2024-03-25 PROCEDURE — P9612 CATHETERIZE FOR URINE SPEC: HCPCS

## 2024-03-25 PROCEDURE — 93005 ELECTROCARDIOGRAM TRACING: CPT | Performed by: EMERGENCY MEDICINE

## 2024-03-25 PROCEDURE — 99285 EMERGENCY DEPT VISIT HI MDM: CPT

## 2024-03-25 PROCEDURE — 71045 X-RAY EXAM CHEST 1 VIEW: CPT

## 2024-03-25 PROCEDURE — 85007 BL SMEAR W/DIFF WBC COUNT: CPT | Performed by: EMERGENCY MEDICINE

## 2024-03-25 PROCEDURE — 93010 ELECTROCARDIOGRAM REPORT: CPT | Performed by: INTERNAL MEDICINE

## 2024-03-25 PROCEDURE — 25010000002 PIPERACILLIN SOD-TAZOBACTAM PER 1 G: Performed by: STUDENT IN AN ORGANIZED HEALTH CARE EDUCATION/TRAINING PROGRAM

## 2024-03-25 PROCEDURE — 83880 ASSAY OF NATRIURETIC PEPTIDE: CPT | Performed by: EMERGENCY MEDICINE

## 2024-03-25 PROCEDURE — 87086 URINE CULTURE/COLONY COUNT: CPT | Performed by: STUDENT IN AN ORGANIZED HEALTH CARE EDUCATION/TRAINING PROGRAM

## 2024-03-25 PROCEDURE — 87040 BLOOD CULTURE FOR BACTERIA: CPT | Performed by: STUDENT IN AN ORGANIZED HEALTH CARE EDUCATION/TRAINING PROGRAM

## 2024-03-25 PROCEDURE — 36415 COLL VENOUS BLD VENIPUNCTURE: CPT

## 2024-03-25 PROCEDURE — 87077 CULTURE AEROBIC IDENTIFY: CPT | Performed by: STUDENT IN AN ORGANIZED HEALTH CARE EDUCATION/TRAINING PROGRAM

## 2024-03-25 PROCEDURE — 81001 URINALYSIS AUTO W/SCOPE: CPT | Performed by: EMERGENCY MEDICINE

## 2024-03-25 PROCEDURE — 85025 COMPLETE CBC W/AUTO DIFF WBC: CPT | Performed by: EMERGENCY MEDICINE

## 2024-03-25 PROCEDURE — 80053 COMPREHEN METABOLIC PANEL: CPT | Performed by: EMERGENCY MEDICINE

## 2024-03-25 PROCEDURE — 83690 ASSAY OF LIPASE: CPT | Performed by: EMERGENCY MEDICINE

## 2024-03-25 PROCEDURE — 83735 ASSAY OF MAGNESIUM: CPT | Performed by: EMERGENCY MEDICINE

## 2024-03-25 PROCEDURE — 74176 CT ABD & PELVIS W/O CONTRAST: CPT

## 2024-03-25 PROCEDURE — 70450 CT HEAD/BRAIN W/O DYE: CPT

## 2024-03-25 PROCEDURE — 84145 PROCALCITONIN (PCT): CPT | Performed by: EMERGENCY MEDICINE

## 2024-03-25 PROCEDURE — 25810000003 SODIUM CHLORIDE 0.9 % SOLUTION: Performed by: EMERGENCY MEDICINE

## 2024-03-25 PROCEDURE — 83605 ASSAY OF LACTIC ACID: CPT | Performed by: EMERGENCY MEDICINE

## 2024-03-25 PROCEDURE — 84484 ASSAY OF TROPONIN QUANT: CPT | Performed by: EMERGENCY MEDICINE

## 2024-03-25 PROCEDURE — 25010000002 FENTANYL CITRATE (PF) 50 MCG/ML SOLUTION: Performed by: EMERGENCY MEDICINE

## 2024-03-25 PROCEDURE — 0202U NFCT DS 22 TRGT SARS-COV-2: CPT | Performed by: EMERGENCY MEDICINE

## 2024-03-25 PROCEDURE — 84443 ASSAY THYROID STIM HORMONE: CPT | Performed by: EMERGENCY MEDICINE

## 2024-03-25 PROCEDURE — 87186 SC STD MICRODIL/AGAR DIL: CPT | Performed by: STUDENT IN AN ORGANIZED HEALTH CARE EDUCATION/TRAINING PROGRAM

## 2024-03-25 PROCEDURE — 25010000002 ONDANSETRON PER 1 MG: Performed by: EMERGENCY MEDICINE

## 2024-03-25 PROCEDURE — 82550 ASSAY OF CK (CPK): CPT | Performed by: EMERGENCY MEDICINE

## 2024-03-25 RX ORDER — BISACODYL 10 MG
10 SUPPOSITORY, RECTAL RECTAL DAILY PRN
Status: DISCONTINUED | OUTPATIENT
Start: 2024-03-25 | End: 2024-03-26

## 2024-03-25 RX ORDER — ATENOLOL AND CHLORTHALIDONE TABLET 50; 25 MG/1; MG/1
1 TABLET ORAL DAILY
COMMUNITY
End: 2024-04-09 | Stop reason: HOSPADM

## 2024-03-25 RX ORDER — ACETAMINOPHEN 325 MG/1
650 TABLET ORAL EVERY 4 HOURS PRN
Status: DISCONTINUED | OUTPATIENT
Start: 2024-03-25 | End: 2024-04-01

## 2024-03-25 RX ORDER — FINASTERIDE 5 MG/1
5 TABLET, FILM COATED ORAL DAILY
COMMUNITY
End: 2024-04-09 | Stop reason: HOSPADM

## 2024-03-25 RX ORDER — ONDANSETRON 2 MG/ML
4 INJECTION INTRAMUSCULAR; INTRAVENOUS ONCE
Status: COMPLETED | OUTPATIENT
Start: 2024-03-25 | End: 2024-03-25

## 2024-03-25 RX ORDER — SODIUM CHLORIDE 0.9 % (FLUSH) 0.9 %
10 SYRINGE (ML) INJECTION AS NEEDED
Status: DISCONTINUED | OUTPATIENT
Start: 2024-03-25 | End: 2024-04-09 | Stop reason: HOSPADM

## 2024-03-25 RX ORDER — POLYETHYLENE GLYCOL 3350 17 G/17G
17 POWDER, FOR SOLUTION ORAL DAILY PRN
Status: DISCONTINUED | OUTPATIENT
Start: 2024-03-25 | End: 2024-03-26

## 2024-03-25 RX ORDER — FINASTERIDE 5 MG/1
5 TABLET, FILM COATED ORAL DAILY
Status: DISCONTINUED | OUTPATIENT
Start: 2024-03-26 | End: 2024-04-02

## 2024-03-25 RX ORDER — TAMSULOSIN HYDROCHLORIDE 0.4 MG/1
0.4 CAPSULE ORAL NIGHTLY
Status: DISCONTINUED | OUTPATIENT
Start: 2024-03-26 | End: 2024-04-01

## 2024-03-25 RX ORDER — ONDANSETRON 2 MG/ML
4 INJECTION INTRAMUSCULAR; INTRAVENOUS EVERY 6 HOURS PRN
Status: DISCONTINUED | OUTPATIENT
Start: 2024-03-25 | End: 2024-04-09 | Stop reason: HOSPADM

## 2024-03-25 RX ORDER — BISACODYL 5 MG/1
5 TABLET, DELAYED RELEASE ORAL DAILY PRN
Status: DISCONTINUED | OUTPATIENT
Start: 2024-03-25 | End: 2024-03-26

## 2024-03-25 RX ORDER — SODIUM CHLORIDE 9 MG/ML
125 INJECTION, SOLUTION INTRAVENOUS CONTINUOUS
Status: DISCONTINUED | OUTPATIENT
Start: 2024-03-26 | End: 2024-03-26

## 2024-03-25 RX ORDER — ONDANSETRON 4 MG/1
4 TABLET, ORALLY DISINTEGRATING ORAL EVERY 6 HOURS PRN
Status: DISCONTINUED | OUTPATIENT
Start: 2024-03-25 | End: 2024-04-09 | Stop reason: HOSPADM

## 2024-03-25 RX ORDER — PANTOPRAZOLE SODIUM 40 MG/1
40 TABLET, DELAYED RELEASE ORAL
Status: DISCONTINUED | OUTPATIENT
Start: 2024-03-26 | End: 2024-03-30

## 2024-03-25 RX ORDER — AMOXICILLIN 250 MG
2 CAPSULE ORAL 2 TIMES DAILY PRN
Status: DISCONTINUED | OUTPATIENT
Start: 2024-03-25 | End: 2024-03-26

## 2024-03-25 RX ORDER — UREA 10 %
3 LOTION (ML) TOPICAL NIGHTLY PRN
Status: DISCONTINUED | OUTPATIENT
Start: 2024-03-25 | End: 2024-04-01

## 2024-03-25 RX ORDER — FENTANYL CITRATE 50 UG/ML
25 INJECTION, SOLUTION INTRAMUSCULAR; INTRAVENOUS ONCE
Status: COMPLETED | OUTPATIENT
Start: 2024-03-25 | End: 2024-03-25

## 2024-03-25 RX ADMIN — FENTANYL CITRATE 25 MCG: 50 INJECTION, SOLUTION INTRAMUSCULAR; INTRAVENOUS at 17:40

## 2024-03-25 RX ADMIN — Medication 10 ML: at 17:19

## 2024-03-25 RX ADMIN — PIPERACILLIN AND TAZOBACTAM 3.38 G: 3; .375 INJECTION, POWDER, FOR SOLUTION INTRAVENOUS at 20:12

## 2024-03-25 RX ADMIN — ONDANSETRON 4 MG: 2 INJECTION INTRAMUSCULAR; INTRAVENOUS at 17:39

## 2024-03-25 RX ADMIN — SODIUM CHLORIDE 500 ML: 9 INJECTION, SOLUTION INTRAVENOUS at 17:20

## 2024-03-25 NOTE — LETTER
[facility] CASE MAN  [address] JAIN [facility] [address]  [phone]        April 2, 2024      Patient: Anthony Gallegos  YOB: 1945  Date of Visit: 3/25/2024      To Whom it May Concern:    Anthony Gallegos will not be able to return to his assisted living facility at [facility] due to the fact that he now requires long term placement. He will require tube feeding management, as well as management of his activities of daily living.        Gerson Townsend MD

## 2024-03-25 NOTE — ED PROVIDER NOTES
" EMERGENCY DEPARTMENT ENCOUNTER    Room Number:  123/1  PCP: Provider, No Known  Independent Historians: Family    HPI:  Chief Complaint: had concerns including Weakness - Generalized. And not eating and drinking     A complete HPI/ROS/PMH/PSH/SH/FH are unobtainable due to: Poor historian and Acutely ill and not able to provide history    Chronic or social conditions impacting patient care (Social Determinants of Health): None      Context: Anthony Gallegos is a 79 y.o. male with a medical history of \"arthritis\" on remicade, hypertension, and gerd who presents to the ED c/o acute generalized weakness, moaning in pain frequently, diarrhea, and decreased oral intake.  Family notes that patient tongue was dry and coated with \"white crust\" yesterday.  No fever, no vomiting, no shortness of breath, no cough.  Family remarks that patient has had a decline since January.  They show me a picture of patient in November where he did not appear as frail and definitely has had significant weight loss since then.  They remarked that he was admitted in January and found to have a urinary tract infection and was dehydrated at that time.  Since then and over the last month especially he has gotten much more debilitated although finally able to get his catheter removed recently.  He had been discharged with a catheter after the hospital stay.        Review of prior external notes (non-ED) -and- Review of prior external test results outside of this encounter: I reviewed patient's discharge summary from January 31.  Patient was admitted with altered mental status and found to have a urinary tract I infection, CKD, and dehydration.  Nephrology and urology both saw patient.  He was noted to have some hypotensive episodes so his atenolol was stopped.  Patient completed a 10-day course of Augmentin.  Patient also required indwelling Mariscal catheter due to retention and BPH.  He was discharged with a catheter    Prescription drug monitoring " program review:     N/A    PAST MEDICAL HISTORY  Active Ambulatory Problems     Diagnosis Date Noted    Ankylosing spondylitis of cervical region 06/29/2017    Recent unexplained weight loss 07/14/2017    Abnormal serum lipase level 08/10/2017    Abnormal serum level of amylase 08/10/2017    Hypertension 10/19/2017    Boil 03/22/2018    Immobility 12/20/2023    Fall from bed 12/20/2023    Tetrahydrocannabinol (THC) use disorder, mild, abuse 12/20/2023    Generalized pain 12/20/2023     12/20/2023    Grief 12/20/2023    DISH (disseminated idiopathic skeletal hyperostosis) 12/20/2023    Generalized weakness 12/21/2023    Severe malnutrition 12/24/2023    Altered mental status 01/21/2024    Transient alteration of awareness 01/22/2024    GERD without esophagitis 01/22/2024    BPH (benign prostatic hyperplasia) 01/22/2024    UTI (urinary tract infection) 01/22/2024    Dehydration 01/22/2024    Hyperglycemia 01/22/2024     Resolved Ambulatory Problems     Diagnosis Date Noted    Urine retention 12/20/2023    Constipation 12/20/2023    Leukocytosis 01/22/2024    MARTITA (acute kidney injury) 01/22/2024    Altered mental state 01/22/2024     Past Medical History:   Diagnosis Date    Abscess of scrotum     Arthritis     AS (ankylosing spondylitis)     Uveitis          PAST SURGICAL HISTORY  Past Surgical History:   Procedure Laterality Date    COLONOSCOPY      ROTATOR CUFF REPAIR Right     TOTAL HIP ARTHROPLASTY Left 2014         FAMILY HISTORY  Family History   Problem Relation Age of Onset    Alzheimer's disease Mother          SOCIAL HISTORY  Social History     Socioeconomic History    Marital status:     Number of children: 2   Tobacco Use    Smoking status: Never    Smokeless tobacco: Never   Vaping Use    Vaping status: Never Used   Substance and Sexual Activity    Alcohol use: No    Drug use: No    Sexual activity: Defer         ALLERGIES  Patient has no known allergies.        REVIEW OF SYSTEMS  Review of  "Systems  Included in HPI  All systems reviewed and negative except for those discussed in HPI.      PHYSICAL EXAM    I have reviewed the triage vital signs and nursing notes.    ED Triage Vitals [03/25/24 1516]   Temp Heart Rate Resp BP SpO2   98.3 °F (36.8 °C) 84 16 110/63 98 %      Temp src Heart Rate Source Patient Position BP Location FiO2 (%)   Tympanic Monitor Sitting Right arm --       Physical Exam  GENERAL: Ill-appearing thin frail male, alert, moans in pain while family is talking about his recent medical history then thanks maybe for \"visiting with him\"   SKIN: Warm, dry  HENT: Normocephalic, atraumatic, dry mucous membranes  EYES: no scleral icterus  CV: regular rhythm, regular rate  RESPIRATORY: normal effort, lungs clear, no wheezing, no rhonchi  ABDOMEN: soft, diffuse abdominal tenderness to palpation but no rebound and no guarding, nondistended  MUSCULOSKELETAL: no deformity, no lower extremity edema, no calf tenderness palpation  NEURO: alert, moves all extremities, follows limited commands                                                                   LAB RESULTS  Recent Results (from the past 24 hour(s))   Respiratory Panel PCR w/COVID-19(SARS-CoV-2) [facility]/TRAM/KELLE/PAD/[facility]/JOSEPH In-House, NP Swab in UTM/VTM, 2 HR TAT - Swab, Nasopharynx    Collection Time: 03/25/24  5:22 PM    Specimen: Nasopharynx; Swab   Result Value Ref Range    ADENOVIRUS, PCR Not Detected Not Detected    Coronavirus 229E Not Detected Not Detected    Coronavirus HKU1 Not Detected Not Detected    Coronavirus NL63 Not Detected Not Detected    Coronavirus OC43 Not Detected Not Detected    COVID19 Not Detected Not Detected - Ref. Range    Human Metapneumovirus Not Detected Not Detected    Human Rhinovirus/Enterovirus Not Detected Not Detected    Influenza A PCR Not Detected Not Detected    Influenza B PCR Not Detected Not Detected    Parainfluenza Virus 1 Not Detected Not Detected    Parainfluenza Virus 2 Not Detected Not Detected "    Parainfluenza Virus 3 Not Detected Not Detected    Parainfluenza Virus 4 Not Detected Not Detected    RSV, PCR Not Detected Not Detected    Bordetella pertussis pcr Not Detected Not Detected    Bordetella parapertussis PCR Not Detected Not Detected    Chlamydophila pneumoniae PCR Not Detected Not Detected    Mycoplasma pneumo by PCR Not Detected Not Detected   Comprehensive Metabolic Panel    Collection Time: 03/25/24  5:23 PM    Specimen: Blood   Result Value Ref Range    Glucose 125 (H) 65 - 99 mg/dL    BUN 82 (H) 8 - 23 mg/dL    Creatinine 2.50 (H) 0.76 - 1.27 mg/dL    Sodium 140 136 - 145 mmol/L    Potassium 4.9 3.5 - 5.2 mmol/L    Chloride 101 98 - 107 mmol/L    CO2 24.0 22.0 - 29.0 mmol/L    Calcium 9.7 8.6 - 10.5 mg/dL    Total Protein 9.5 (H) 6.0 - 8.5 g/dL    Albumin 3.0 (L) 3.5 - 5.2 g/dL    ALT (SGPT) 15 1 - 41 U/L    AST (SGOT) 16 1 - 40 U/L    Alkaline Phosphatase 112 39 - 117 U/L    Total Bilirubin 0.6 0.0 - 1.2 mg/dL    Globulin 6.5 gm/dL    A/G Ratio 0.5 g/dL    BUN/Creatinine Ratio 32.8 (H) 7.0 - 25.0    Anion Gap 15.0 5.0 - 15.0 mmol/L    eGFR 25.3 (L) >60.0 mL/min/1.73   Lipase    Collection Time: 03/25/24  5:23 PM    Specimen: Blood   Result Value Ref Range    Lipase 62 (H) 13 - 60 U/L   BNP    Collection Time: 03/25/24  5:23 PM    Specimen: Blood   Result Value Ref Range    proBNP 783.0 0.0 - 1,800.0 pg/mL   High Sensitivity Troponin T    Collection Time: 03/25/24  5:23 PM    Specimen: Blood   Result Value Ref Range    HS Troponin T 51 (H) <22 ng/L   Lactic Acid, Plasma    Collection Time: 03/25/24  5:23 PM    Specimen: Blood   Result Value Ref Range    Lactate 2.4 (C) 0.5 - 2.0 mmol/L   Procalcitonin    Collection Time: 03/25/24  5:23 PM    Specimen: Blood   Result Value Ref Range    Procalcitonin 3.36 (H) 0.00 - 0.25 ng/mL   CK    Collection Time: 03/25/24  5:23 PM    Specimen: Blood   Result Value Ref Range    Creatine Kinase 29 20 - 200 U/L   Magnesium    Collection Time: 03/25/24  5:23  PM    Specimen: Blood   Result Value Ref Range    Magnesium 2.6 (H) 1.6 - 2.4 mg/dL   TSH    Collection Time: 03/25/24  5:23 PM    Specimen: Blood   Result Value Ref Range    TSH 2.190 0.270 - 4.200 uIU/mL   CBC Auto Differential    Collection Time: 03/25/24  5:23 PM    Specimen: Blood   Result Value Ref Range    WBC 11.85 (H) 3.40 - 10.80 10*3/mm3    RBC 4.79 4.14 - 5.80 10*6/mm3    Hemoglobin 13.2 13.0 - 17.7 g/dL    Hematocrit 41.3 37.5 - 51.0 %    MCV 86.2 79.0 - 97.0 fL    MCH 27.6 26.6 - 33.0 pg    MCHC 32.0 31.5 - 35.7 g/dL    RDW 13.1 12.3 - 15.4 %    RDW-SD 41.3 37.0 - 54.0 fl    MPV 9.4 6.0 - 12.0 fL    Platelets 471 (H) 140 - 450 10*3/mm3   Green Top (Gel)    Collection Time: 03/25/24  5:23 PM   Result Value Ref Range    Extra Tube Hold for add-ons.    Lavender Top    Collection Time: 03/25/24  5:23 PM   Result Value Ref Range    Extra Tube hold for add-on    Gold Top - SST    Collection Time: 03/25/24  5:23 PM   Result Value Ref Range    Extra Tube Hold for add-ons.    Light Blue Top    Collection Time: 03/25/24  5:23 PM   Result Value Ref Range    Extra Tube Hold for add-ons.    Manual Differential    Collection Time: 03/25/24  5:23 PM    Specimen: Blood   Result Value Ref Range    Neutrophil % 73.5 42.7 - 76.0 %    Lymphocyte % 14.3 (L) 19.6 - 45.3 %    Monocyte % 12.2 (H) 5.0 - 12.0 %    Neutrophils Absolute 8.71 (H) 1.70 - 7.00 10*3/mm3    Lymphocytes Absolute 1.69 0.70 - 3.10 10*3/mm3    Monocytes Absolute 1.45 (H) 0.10 - 0.90 10*3/mm3    RBC Morphology Normal Normal    WBC Morphology Normal Normal    Platelet Morphology Normal Normal   ECG 12 Lead Altered Mental Status    Collection Time: 03/25/24  5:32 PM   Result Value Ref Range    QT Interval 395 ms    QTC Interval 462 ms   Urinalysis With Microscopic If Indicated (No Culture) - Straight Cath    Collection Time: 03/25/24  7:18 PM    Specimen: Straight Cath; Urine   Result Value Ref Range    Color, UA Yellow Yellow, Straw    Appearance, UA Turbid  (A) Clear    pH, UA 7.0 5.0 - 8.0    Specific Gravity, UA 1.011 1.005 - 1.030    Glucose, UA Negative Negative    Ketones, UA Negative Negative    Bilirubin, UA Negative Negative    Blood, UA Large (3+) (A) Negative    Protein, UA >=300 mg/dL (3+) (A) Negative    Leuk Esterase, UA Large (3+) (A) Negative    Nitrite, UA Negative Negative    Urobilinogen, UA 0.2 E.U./dL 0.2 - 1.0 E.U./dL   Urinalysis, Microscopic Only - Straight Cath    Collection Time: 03/25/24  7:18 PM    Specimen: Straight Cath; Urine   Result Value Ref Range    RBC, UA 3-5 (A) None Seen, 0-2 /HPF    WBC, UA Too Numerous to Count (A) None Seen, 0-2 /HPF    Bacteria, UA 4+ (A) None Seen /HPF    Squamous Epithelial Cells, UA None Seen None Seen, 0-2 /HPF    Hyaline Casts, UA None Seen None Seen /LPF    Methodology Manual Light Microscopy    STAT Lactic Acid, Reflex    Collection Time: 03/25/24  7:56 PM    Specimen: Arm, Right; Blood   Result Value Ref Range    Lactate 2.1 (C) 0.5 - 2.0 mmol/L   High Sensitivity Troponin T 2Hr    Collection Time: 03/25/24  8:10 PM    Specimen: Arm, Left; Blood   Result Value Ref Range    HS Troponin T 45 (H) <22 ng/L    Troponin T Delta -6 (L) >=-4 - <+4 ng/L         RADIOLOGY  CT Abdomen Pelvis Without Contrast    Result Date: 3/25/2024  CT OF THE ABDOMEN AND PELVIS WITHOUT CONTRAST 03/25/2024  HISTORY: Abdominal pain.  Spiral images were obtained from the lung bases to the symphysis pubis. No intravenous or oral contrast was given.  There is streak artifact from patient's arms. The liver, gallbladder and spleen appear unremarkable. Pancreas appears somewhat atrophic. The adrenals appear unremarkable. Kidneys are obscured by streak artifact but there may be some mild hydronephrosis bilaterally, greater on the right than on the left and there may be at least 1 right renal cyst such as on image 46.  There is moderate gaseous distention of the colon with moderate amount of stool in the right colon. No small bowel  dilatation is seen. Urinary bladder is moderately distended. Multiple fluid collections are seen adjacent to the superior aspect of the urinary bladder and these may represent multiple urinary bladder diverticula but are somewhat more numerous than on the previous study of 01/23/2024. There is wall thickening of the urinary bladder as well. Left hip prosthesis produces streak artifact and obscures the pelvis as well.      1. Some of the images are obscured by streak artifact from patient's arms and left hip prosthesis. 2. Kidneys are somewhat obscured but there may be mild-to-moderate bilateral hydronephrosis and at least 1 right renal cyst. 3. Moderate gaseous distention of the colon with moderate amount of stool in the right colon and rectum. 4. Wall thickening of the urinary bladder with several urinary bladder diverticula as seen. These appear more extensive than on the 01/23/2024 study. Please correlate for urinary bladder outlet obstruction/partial obstruction.  Radiation dose reduction techniques were utilized, including automated exposure control and exposure modulation based on body size.       CT Head Without Contrast    Result Date: 3/25/2024  CT OF THE BRAIN WITHOUT CONTRAST 03/25/2024  HISTORY: Mental status change.  Axial images were obtained through the brain without intravenous contrast.  There is moderate diffuse atrophy. There is decreased attenuation of the periventricular white matter bilaterally consistent with small vessel white matter ischemic disease. There is no evidence of acute infarction, hemorrhage, midline shift or mass effect.  No bony abnormalities are seen.      No acute process identified.  Radiation dose reduction techniques were utilized, including automated exposure control and exposure modulation based on body size.       XR Chest 1 View    Result Date: 3/25/2024  XR CHEST 1 VW-3/25/2024  HISTORY: Shortness of breath.  Heart size is within normal limits. Lungs appear free of  acute infiltrates. There is mild vascular congestion. There is some aortic calcification.      1. No acute process except for mild vascular congestion.   This report was finalized on 3/25/2024 6:06 PM by Dr. Dario Dumont M.D on Workstation: [id number]         MEDICATIONS GIVEN IN ER  Medications   sodium chloride 0.9 % flush 10 mL (10 mL Intravenous Given 3/25/24 1719)   acetaminophen (TYLENOL) tablet 650 mg (has no administration in time range)   ondansetron ODT (ZOFRAN-ODT) disintegrating tablet 4 mg (has no administration in time range)     Or   ondansetron (ZOFRAN) injection 4 mg (has no administration in time range)   melatonin tablet 3 mg (has no administration in time range)   sennosides-docusate (PERICOLACE) 8.6-50 MG per tablet 2 tablet (has no administration in time range)     And   polyethylene glycol (MIRALAX) packet 17 g (has no administration in time range)     And   bisacodyl (DULCOLAX) EC tablet 5 mg (has no administration in time range)     And   bisacodyl (DULCOLAX) suppository 10 mg (has no administration in time range)   sodium chloride 0.9 % bolus 500 mL (0 mL Intravenous Stopped 3/25/24 1744)   ondansetron (ZOFRAN) injection 4 mg (4 mg Intravenous Given 3/25/24 1739)   fentaNYL citrate (PF) (SUBLIMAZE) injection 25 mcg (25 mcg Intravenous Given 3/25/24 1740)   piperacillin-tazobactam (ZOSYN) 3.375 g IVPB in 100 mL NS MBP (CD) (3.375 g Intravenous New Bag 3/25/24 2012)         ORDERS PLACED DURING THIS VISIT:  Orders Placed This Encounter   Procedures    Respiratory Panel PCR w/COVID-19(SARS-CoV-2) [facility] In-House, NP Swab in UTM/VTM, 2 HR TAT - Swab, Nasopharynx    Blood Culture - Blood,    Blood Culture - Blood,    XR Chest 1 View    CT Head Without Contrast    CT Abdomen Pelvis Without Contrast    Comprehensive Metabolic Panel    Lipase    Urinalysis With Microscopic If Indicated (No Culture) - Urine, Catheter    BNP    High Sensitivity Troponin T    Lactic Acid, Plasma     Procalcitonin    CK    Magnesium    TSH    CBC Auto Differential    Palms Draw    Manual Differential    High Sensitivity Troponin T 2Hr    STAT Lactic Acid, Reflex    Urinalysis, Microscopic Only - Urine, Clean Catch    Basic Metabolic Panel    CBC (No Diff)    STAT Lactic Acid, Reflex    Diet: Cardiac; Healthy Heart (2-3 Na+); Fluid Consistency: Thin (IDDSI 0)    NPO Diet NPO Type: Strict NPO    Vital Signs    Up with assistance    Daily Weights    Strict Intake & Output    Oral Care    Place Sequential Compression Device    Maintain Sequential Compression Device    Code Status and Medical Interventions:    LHMADISYN (on-call MD unless specified) Details    Inpatient Case Management  Consult    Inpatient Nutrition Consult    SLP Consult: Eval & Treat RN Dysphagia Screen Failed    ECG 12 Lead Altered Mental Status    Wound Ostomy Eval & Treat    Insert Peripheral IV    Inpatient Admission    CBC & Differential    Green Top (Gel)    Lavender Top    Gold Top - SST    Light Blue Top         OUTPATIENT MEDICATION MANAGEMENT:  Current Facility-Administered Medications Ordered in Epic   Medication Dose Route Frequency Provider Last Rate Last Admin    acetaminophen (TYLENOL) tablet 650 mg  650 mg Oral Q4H PRN Alexys, Casie Araujo MD        sennosides-docusate (PERICOLACE) 8.6-50 MG per tablet 2 tablet  2 tablet Oral BID PRN Stinglori, Casie Araujo MD        And    polyethylene glycol (MIRALAX) packet 17 g  17 g Oral Daily PRN Stinglori, Casie Araujo MD        And    bisacodyl (DULCOLAX) EC tablet 5 mg  5 mg Oral Daily PRN Alexys, Casie Araujo MD        And    bisacodyl (DULCOLAX) suppository 10 mg  10 mg Rectal Daily PRN Stinglori, Casie Araujo MD        melatonin tablet 3 mg  3 mg Oral Nightly PRN Alexys, Casie Araujo MD        ondansetron ODT (ZOFRAN-ODT) disintegrating tablet 4 mg  4 mg Oral Q6H PRN Casie Reardon MD        Or    ondansetron (ZOFRAN) injection 4 mg  4 mg Intravenous Q6H PRN  Casie Reardon MD        sodium chloride 0.9 % flush 10 mL  10 mL Intravenous PRN Ashleigh Early MD   10 mL at 03/25/24 1719     No current [facility]-ordered outpatient medications on file.         PROCEDURES  Procedures            PROGRESS, DATA ANALYSIS, CONSULTS, AND MEDICAL DECISION MAKING  All labs have been independently interpreted by me.  All radiology studies have been reviewed by me. All EKG's have been independently viewed and interpreted by me.  Discussion below represents my analysis of pertinent findings related to patient's condition, differential diagnosis, treatment plan and final disposition.    Differential diagnosis includes but is not limited to   This patient presents with altered mental status and generalized weakness in the setting of chronic debility and decreased oral intake.  The differential is broad and includes metabolic disturbances (hyper/hyponatremia, hypercalcemia, hypo/hyperglycemia, renal failure with uremia), infection/sepsis, alcohol intoxication or withdrawal, hypoxia/hypercapnia, hepatic encephalopathy, hyper/hypothyroidism, adrenal insufficiency, seizure, trauma, ICH, CVA.  Given the broad differential, plan to monitor patient and obtain EKG, accucheck, serum labs to include electrolytes, cbc, lfts, troponin, urinalysis, CT head, CT abdomen pelvis and chest xray.  Patient will likely require admission.    Initial laboratory studies significant for acute kidney injury with BUN 82 and creatinine 2.5.  On labs done January 29 patient's BUN was 13 and creatinine 0.66.  Patient also with elevated lactic acid of 2.4.  Patient's lipase is elevated at 62.  Patient's procalcitonin is greater than 1 as well.  Patient's white blood cell count is 11.8 with no left shift.  Patient has an elevated troponin at 51.      ED Course as of 03/25/24 2156   Mon Mar 25, 2024   1800 I viewed patient's chest x-ray and on my interpretation patient has normal heart size and mild vascular  congestion without any effusions or pulmonary consolidation [AR]   1832 EKG ER MD interpretation   Time: 17: 32  Rhythm and rate: Normal sinus rhythm at a rate of 82  Axis: Normal  P waves: Normal  QRS complexes: Normal except for LVH  ST segments: no elevation nor depressions  T waves: no flattening or inversions   [AR]   1905 Care transferred to Dr. Hogan pending ct a/p and UA--[facility] admit expected [AR]   1907 Total Protein(!): 9.5 [FS]   1907 Albumin(!): 3.0 [FS]   1917 CT abdomen pelvis interpreted myself:  Severe wall thickening of the bladder with bladder diverticulum anteriorly, some gas noted at the anterior diverticulum of the bladder [FS]   1926 Discussed case with Dr. Bowen with [facility], will admit for further workup and management.  Ordered antibiotics for urinary tract infection. [FS]   2036 Lactate(!!): 2.1 [FS]   2036 Bacteria, UA(!): 4+ [FS]   2036 WBC, UA(!): Too Numerous to Count [FS]      ED Course User Index  [AR] Ashleigh Early MD  [FS] Charli Hogan MD             AS OF 21:56 EDT VITALS:    BP - 111/67  HR - 80  TEMP - 98.3 °F (36.8 °C) (Tympanic)  O2 SATS - 98%      COMPLEXITY OF CARE  The patient requires admission.    DIAGNOSIS  Final diagnoses:   Decreased oral intake   MARTITA (acute kidney injury)   Whole body pain   Dysphagia, unspecified type   Dehydration   Elevated troponin   Failure to thrive in adult   Generalized weakness   Weight loss   Urinary tract infection in male         DISPOSITION  ED Disposition       ED Disposition   Decision to Admit    Condition   --    Comment   Level of Care: Telemetry [5]   Diagnosis: UTI (urinary tract infection) [163876]   Admitting Physician: JOHN BOWEN [[id number]]   Attending Physician: JOHN BOWEN [[id number]]   Certification: I Certify That Inpatient Hospital Services Are Medically Necessary For Greater Than 2 Midnights                  Please note that portions of this document were completed with a voice recognition program.    Note  Disclaimer: At [facility], we believe that sharing information builds trust and better relationships. You are receiving this note because you recently visited [facility]. It is possible you will see health information before a provider has talked with you about it. This kind of information can be easy to misunderstand. To help you fully understand what it means for your health, we urge you to discuss this note with your provider.         Ashleigh Early MD  03/25/24 7260

## 2024-03-25 NOTE — ED NOTES
Pt to ED from facility for failure to thrive, daughter at bedside reports pt has been getting weaker since January, not wanting to eat or drink much, + weight loss, now mostly non-ambulatory. States he has had some issues lately where when he eats he spits up. Discussed concerns for aspiration and states they were wanting to have a swallow eval done but had not gotten it set up yet, discussed npo for now and to see slp here. Daughter feels it would be to his benefit. MM dry

## 2024-03-25 NOTE — PROGRESS NOTES
Clinical Pharmacy Services: Medication History    Anthony Gallegos is a 79 y.o. male presenting to [facility] for   Chief Complaint   Patient presents with    Weakness - Generalized       He  has a past medical history of Abscess of scrotum, MARTITA (acute kidney injury), Altered mental state, Arthritis, AS (ankylosing spondylitis), Hypertension, Leukocytosis, Tetrahydrocannabinol (THC) use disorder, mild, abuse (12/20/2023), and Uveitis.    Allergies as of 03/25/2024    (No Known Allergies)       Medication information was obtained from: [facility]  Pharmacy and Phone Number:     Prior to Admission Medications       Prescriptions Last Dose Informant Patient Reported? Taking?    acetaminophen (TYLENOL) 325 MG tablet  Pharmacy No Yes    Take 2 tablets by mouth Every 4 (Four) Hours As Needed for Mild Pain.    atenolol-chlorthalidone (TENORETIC) 50-25 MG per tablet  Pharmacy Yes Yes    Take 1 tablet by mouth Daily.    finasteride (PROSCAR) 5 MG tablet  Pharmacy Yes Yes    Take 1 tablet by mouth Daily.    melatonin 3 MG tablet  Pharmacy No Yes    Take 1 tablet by mouth At Night As Needed for Sleep.    methotrexate 2.5 MG tablet  Pharmacy Yes Yes    Take 1 tablet by mouth 2 (Two) Times a Week. Pt takes every Wednesday and thursday    Omega-3 Fatty Acids (OMEGA 3 PO)  Pharmacy Yes Yes    Take 1 capsule by mouth Daily.    pantoprazole (PROTONIX) 40 MG EC tablet  Pharmacy No Yes    Take 1 tablet by mouth Every Morning.    tamsulosin (FLOMAX) 0.4 MG capsule 24 hr capsule  Pharmacy No Yes    Take 1 capsule by mouth Every Night.              Medication notes: Unable to discuss medication list with patient due to current clinical status, however the following medications were compiled from external fill histories and interview with noted home pharmacies. I did call pt facility for a faxed copy of medication list however I was told they do not handle pt medications but can call back after 9am in the morning to see if  they have a record of pt medication.     This medication list is complete to the best of my knowledge as of 3/25/2024    Please call if questions.    Regina Wells  Medication History Technician   [phone]    3/25/2024 19:02 EDT

## 2024-03-26 PROBLEM — G93.40 ENCEPHALOPATHY: Status: ACTIVE | Noted: 2024-03-26

## 2024-03-26 LAB
ANION GAP SERPL CALCULATED.3IONS-SCNC: 12 MMOL/L (ref 5–15)
BUN SERPL-MCNC: 78 MG/DL (ref 8–23)
BUN/CREAT SERPL: 33.9 (ref 7–25)
CALCIUM SPEC-SCNC: 8.9 MG/DL (ref 8.6–10.5)
CHLORIDE SERPL-SCNC: 106 MMOL/L (ref 98–107)
CO2 SERPL-SCNC: 20 MMOL/L (ref 22–29)
CREAT SERPL-MCNC: 2.3 MG/DL (ref 0.76–1.27)
DEPRECATED RDW RBC AUTO: 39.8 FL (ref 37–54)
EGFRCR SERPLBLD CKD-EPI 2021: 28 ML/MIN/1.73
ERYTHROCYTE [DISTWIDTH] IN BLOOD BY AUTOMATED COUNT: 12.9 % (ref 12.3–15.4)
GLUCOSE BLDC GLUCOMTR-MCNC: 87 MG/DL (ref 70–130)
GLUCOSE BLDC GLUCOMTR-MCNC: 89 MG/DL (ref 70–130)
GLUCOSE SERPL-MCNC: 104 MG/DL (ref 65–99)
HCT VFR BLD AUTO: 41 % (ref 37.5–51)
HGB BLD-MCNC: 13 G/DL (ref 13–17.7)
MAGNESIUM SERPL-MCNC: 3 MG/DL (ref 1.6–2.4)
MCH RBC QN AUTO: 27.1 PG (ref 26.6–33)
MCHC RBC AUTO-ENTMCNC: 31.7 G/DL (ref 31.5–35.7)
MCV RBC AUTO: 85.6 FL (ref 79–97)
PHOSPHATE SERPL-MCNC: 5.6 MG/DL (ref 2.5–4.5)
PLATELET # BLD AUTO: 422 10*3/MM3 (ref 140–450)
PMV BLD AUTO: 9.3 FL (ref 6–12)
POTASSIUM SERPL-SCNC: 4.8 MMOL/L (ref 3.5–5.2)
QT INTERVAL: 395 MS
QTC INTERVAL: 462 MS
RBC # BLD AUTO: 4.79 10*6/MM3 (ref 4.14–5.8)
SODIUM SERPL-SCNC: 138 MMOL/L (ref 136–145)
WBC NRBC COR # BLD AUTO: 12.83 10*3/MM3 (ref 3.4–10.8)

## 2024-03-26 PROCEDURE — 25810000003 DEXTROSE-NACL PER 500 ML: Performed by: STUDENT IN AN ORGANIZED HEALTH CARE EDUCATION/TRAINING PROGRAM

## 2024-03-26 PROCEDURE — 83735 ASSAY OF MAGNESIUM: CPT | Performed by: STUDENT IN AN ORGANIZED HEALTH CARE EDUCATION/TRAINING PROGRAM

## 2024-03-26 PROCEDURE — 85027 COMPLETE CBC AUTOMATED: CPT | Performed by: INTERNAL MEDICINE

## 2024-03-26 PROCEDURE — 84100 ASSAY OF PHOSPHORUS: CPT | Performed by: STUDENT IN AN ORGANIZED HEALTH CARE EDUCATION/TRAINING PROGRAM

## 2024-03-26 PROCEDURE — 25010000002 HEPARIN (PORCINE) PER 1000 UNITS: Performed by: STUDENT IN AN ORGANIZED HEALTH CARE EDUCATION/TRAINING PROGRAM

## 2024-03-26 PROCEDURE — 25810000003 SODIUM CHLORIDE 0.9 % SOLUTION: Performed by: INTERNAL MEDICINE

## 2024-03-26 PROCEDURE — 80048 BASIC METABOLIC PNL TOTAL CA: CPT | Performed by: INTERNAL MEDICINE

## 2024-03-26 PROCEDURE — 92610 EVALUATE SWALLOWING FUNCTION: CPT

## 2024-03-26 PROCEDURE — 25010000002 PIPERACILLIN SOD-TAZOBACTAM PER 1 G: Performed by: INTERNAL MEDICINE

## 2024-03-26 PROCEDURE — 82948 REAGENT STRIP/BLOOD GLUCOSE: CPT

## 2024-03-26 RX ORDER — NICOTINE POLACRILEX 4 MG
15 LOZENGE BUCCAL
Status: DISCONTINUED | OUTPATIENT
Start: 2024-03-26 | End: 2024-04-09 | Stop reason: HOSPADM

## 2024-03-26 RX ORDER — IBUPROFEN 600 MG/1
1 TABLET ORAL
Status: DISCONTINUED | OUTPATIENT
Start: 2024-03-26 | End: 2024-04-09 | Stop reason: HOSPADM

## 2024-03-26 RX ORDER — BISACODYL 10 MG
10 SUPPOSITORY, RECTAL RECTAL DAILY PRN
Status: DISCONTINUED | OUTPATIENT
Start: 2024-03-26 | End: 2024-03-26

## 2024-03-26 RX ORDER — BISACODYL 10 MG
10 SUPPOSITORY, RECTAL RECTAL ONCE
Status: DISCONTINUED | OUTPATIENT
Start: 2024-03-26 | End: 2024-04-09 | Stop reason: HOSPADM

## 2024-03-26 RX ORDER — AMOXICILLIN 250 MG
2 CAPSULE ORAL 2 TIMES DAILY
Status: DISCONTINUED | OUTPATIENT
Start: 2024-03-26 | End: 2024-03-29

## 2024-03-26 RX ORDER — POLYETHYLENE GLYCOL 3350 17 G/17G
17 POWDER, FOR SOLUTION ORAL 2 TIMES DAILY
Status: DISCONTINUED | OUTPATIENT
Start: 2024-03-26 | End: 2024-03-29

## 2024-03-26 RX ORDER — BISACODYL 5 MG/1
5 TABLET, DELAYED RELEASE ORAL DAILY PRN
Status: DISCONTINUED | OUTPATIENT
Start: 2024-03-26 | End: 2024-03-29

## 2024-03-26 RX ORDER — BISACODYL 10 MG
10 SUPPOSITORY, RECTAL RECTAL DAILY PRN
Status: DISCONTINUED | OUTPATIENT
Start: 2024-03-26 | End: 2024-03-29

## 2024-03-26 RX ORDER — DEXTROSE AND SODIUM CHLORIDE 5; .9 G/100ML; G/100ML
100 INJECTION, SOLUTION INTRAVENOUS CONTINUOUS
Status: DISCONTINUED | OUTPATIENT
Start: 2024-03-26 | End: 2024-03-28

## 2024-03-26 RX ORDER — POLYETHYLENE GLYCOL 3350 17 G/17G
17 POWDER, FOR SOLUTION ORAL DAILY PRN
Status: DISCONTINUED | OUTPATIENT
Start: 2024-03-26 | End: 2024-03-26

## 2024-03-26 RX ORDER — DEXTROSE MONOHYDRATE 25 G/50ML
25 INJECTION, SOLUTION INTRAVENOUS
Status: DISCONTINUED | OUTPATIENT
Start: 2024-03-26 | End: 2024-04-09 | Stop reason: HOSPADM

## 2024-03-26 RX ORDER — BISACODYL 5 MG/1
5 TABLET, DELAYED RELEASE ORAL DAILY PRN
Status: DISCONTINUED | OUTPATIENT
Start: 2024-03-26 | End: 2024-03-26

## 2024-03-26 RX ORDER — AMOXICILLIN 250 MG
2 CAPSULE ORAL 2 TIMES DAILY
Status: DISCONTINUED | OUTPATIENT
Start: 2024-03-26 | End: 2024-03-26

## 2024-03-26 RX ORDER — HEPARIN SODIUM 5000 [USP'U]/ML
5000 INJECTION, SOLUTION INTRAVENOUS; SUBCUTANEOUS EVERY 8 HOURS SCHEDULED
Status: DISCONTINUED | OUTPATIENT
Start: 2024-03-26 | End: 2024-03-30

## 2024-03-26 RX ADMIN — DEXTROSE AND SODIUM CHLORIDE 100 ML/HR: 5; 900 INJECTION, SOLUTION INTRAVENOUS at 20:13

## 2024-03-26 RX ADMIN — HEPARIN SODIUM 5000 UNITS: 5000 INJECTION INTRAVENOUS; SUBCUTANEOUS at 23:30

## 2024-03-26 RX ADMIN — HEPARIN SODIUM 5000 UNITS: 5000 INJECTION INTRAVENOUS; SUBCUTANEOUS at 15:16

## 2024-03-26 RX ADMIN — PIPERACILLIN AND TAZOBACTAM 3.38 G: 3; .375 INJECTION, POWDER, FOR SOLUTION INTRAVENOUS at 03:43

## 2024-03-26 RX ADMIN — SODIUM CHLORIDE 125 ML/HR: 9 INJECTION, SOLUTION INTRAVENOUS at 15:16

## 2024-03-26 RX ADMIN — SODIUM CHLORIDE 125 ML/HR: 9 INJECTION, SOLUTION INTRAVENOUS at 00:00

## 2024-03-26 RX ADMIN — PIPERACILLIN AND TAZOBACTAM 3.38 G: 3; .375 INJECTION, POWDER, FOR SOLUTION INTRAVENOUS at 18:36

## 2024-03-26 RX ADMIN — PIPERACILLIN AND TAZOBACTAM 3.38 G: 3; .375 INJECTION, POWDER, FOR SOLUTION INTRAVENOUS at 10:44

## 2024-03-26 NOTE — PROGRESS NOTES
Name: Anthony Gallegos ADMIT: 3/25/2024   : 1945  PCP: Provider, No Known    MRN: [id number] LOS: 1 days   AGE/SEX: 79 y.o. male  ROOM: [facility]/1     Subjective   Subjective   Patient laying in bed, confused, oriented to name only, does follow commands.  Does reply yes or no to questions.  Patient denies abdominal pain when asked, no nausea no vomiting.  No fevers or chills, chest pain or palpitations.    Review of Systems   As above  Objective   Objective   Vital Signs  Temp:  [97.9 °F (36.6 °C)-98.5 °F (36.9 °C)] 98.5 °F (36.9 °C)  Heart Rate:  [78-85] 78  Resp:  [14-20] 16  BP: ()/(54-79) 126/74  SpO2:  [98 %-100 %] 99 %  on   ;   Device (Oxygen Therapy): room air  Body mass index is 19.05 kg/m².  Physical Exam    General: Laying in bed, oriented to name only, chronically ill appearing,  HEENT: Normocephalic, atraumatic  CV: Regular rate and rhythm, no murmurs rubs   Lungs: CTA, no wheezing es  Abdomen: Soft, mildly distended, nontender  Extremities: No significant peripheral edema , no cyanosis, generalized weakness    Results Review     I reviewed the patient's new clinical results.  Results from last 7 days   Lab Units 24  0406 24  1723   WBC 10*3/mm3 12.83* 11.85*   HEMOGLOBIN g/dL 13.0 13.2   PLATELETS 10*3/mm3 422 471*     Results from last 7 days   Lab Units 24  0406 24  1723   SODIUM mmol/L 138 140   POTASSIUM mmol/L 4.8 4.9   CHLORIDE mmol/L 106 101   CO2 mmol/L 20.0* 24.0   BUN mg/dL 78* 82*   CREATININE mg/dL 2.30* 2.50*   GLUCOSE mg/dL 104* 125*   Estimated Creatinine Clearance: 25 mL/min (A) (by C-G formula based on SCr of 2.3 mg/dL (H)).  Results from last 7 days   Lab Units 24  1723   ALBUMIN g/dL 3.0*   BILIRUBIN mg/dL 0.6   ALK PHOS U/L 112   AST (SGOT) U/L 16   ALT (SGPT) U/L 15     Results from last 7 days   Lab Units 24  0406 24  1723   CALCIUM mg/dL 8.9 9.7   ALBUMIN g/dL  --  3.0*   MAGNESIUM mg/dL  --  2.6*     Results from last 7 days  "  Lab Units 03/25/24  2303 03/25/24 1956 03/25/24  1723   PROCALCITONIN ng/mL  --   --  3.36*   LACTATE mmol/L 1.8 2.1* 2.4*     COVID19   Date Value Ref Range Status   03/25/2024 Not Detected Not Detected - Ref. Range Final     No results found for: \"HGBA1C\", \"POCGLU\"        CT Abdomen Pelvis Without Contrast  Narrative: CT OF THE ABDOMEN AND PELVIS WITHOUT CONTRAST 03/25/2024     HISTORY: Abdominal pain.     Spiral images were obtained from the lung bases to the symphysis pubis.  No intravenous or oral contrast was given.     There is streak artifact from patient's arms. The liver, gallbladder and  spleen appear unremarkable. Pancreas appears somewhat atrophic. The  adrenals appear unremarkable. Kidneys are obscured by streak artifact  but there may be some mild hydronephrosis bilaterally, greater on the  right than on the left and there may be at least 1 right renal cyst such  as on image 46.     There is moderate gaseous distention of the colon with moderate amount  of stool in the right colon. No small bowel dilatation is seen. Urinary  bladder is moderately distended. Multiple fluid collections are seen  adjacent to the superior aspect of the urinary bladder and these may  represent multiple urinary bladder diverticula but are somewhat more  numerous than on the previous study of 01/23/2024. There is wall  thickening of the urinary bladder as well. Left hip prosthesis produces  streak artifact and obscures the pelvis as well.     Impression: 1. Some of the images are obscured by streak artifact from patient's  arms and left hip prosthesis.  2. Kidneys are somewhat obscured but there may be mild-to-moderate  bilateral hydronephrosis and at least 1 right renal cyst.  3. Moderate gaseous distention of the colon with moderate amount of  stool in the right colon and rectum.  4. 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 These appear more extensive than on the " 01/23/2024  study. Please correlate for urinary bladder outlet obstruction/partial  obstruction.     Radiation dose reduction techniques were utilized, including automated  exposure control and exposure modulation based on body size.        CT Head Without Contrast  Narrative: CT OF THE BRAIN WITHOUT CONTRAST 03/25/2024     HISTORY: Mental status change.     Axial images were obtained through the brain without intravenous  contrast.     There is moderate diffuse atrophy. There is decreased attenuation of the  periventricular white matter bilaterally consistent with small vessel  white matter ischemic disease. There is no evidence of acute infarction,  hemorrhage, midline shift or mass effect.     No bony abnormalities are seen.     Impression: No acute process identified.     Radiation dose reduction techniques were utilized, including automated  exposure control and exposure modulation based on body size.        XR Chest 1 View  Narrative: XR CHEST 1 VW-3/25/2024     HISTORY: Shortness of breath.     Heart size is within normal limits. Lungs appear free of acute  infiltrates. There is mild vascular congestion. There is some aortic  calcification.     Impression: 1. No acute process except for mild vascular congestion.        This report was finalized on 3/25/2024 6:06 PM by Dr. Dario Dumont M.D on Workstation: [id number]       Scheduled Medications  bisacodyl, 10 mg, Rectal, Once  finasteride, 5 mg, Oral, Daily  pantoprazole, 40 mg, Oral, Q AM  piperacillin-tazobactam, 3.375 g, Intravenous, Q8H  senna-docusate sodium, 2 tablet, Oral, BID   And  polyethylene glycol, 17 g, Oral, BID  tamsulosin, 0.4 mg, Oral, Nightly    Infusions  sodium chloride, 125 mL/hr, Last Rate: 125 mL/hr (03/26/24 0000)    Diet  NPO Diet NPO Type: Strict NPO    I have personally reviewed     [x]  Laboratory   [x]  Microbiology   [x]  Radiology   []  EKG/Telemetry  []  Cardiology/Vascular   []  Pathology    []  Records        Assessment/Plan     Active Hospital Problems    Diagnosis  POA    **UTI (urinary tract infection) [N39.0]  Yes    Encephalopathy [G93.40]  Unknown    MARTITA (acute kidney injury) [N17.9]  Yes    Severe malnutrition [E43]  Yes    Hypertension [I10]  Yes      Resolved Hospital Problems   No resolved problems to display.       Patient is a 78 y.o. male with a history of HTN, BPH pyelonephritis/UTI, who presented to [facility] from facility for failure to thrive, not eating or drinking much, weight loss, decreased mobility.    UTI  -Urine culture and blood culture pending  -Recent urine culture was positive for Klebsiella, sensitive to Zosyn  -Continue IV Zosyn, follow-up culture results    MARTITA/bilateral hydronephrosis  -Creatinine 2.5 on admission, up from 0.66 on 01/29/2024  -CT scan showed mild-to-moderate bilateral hydronephrosis   --Creatinine improving  -Continue IV fluids,, monitor daily BMP  -Hold outpatient tenoretic  for blood pressure  -Nephrology neurology consult pending    Encephalopathy  -Patient oriented to name only, not to time or place  -No history of dementia per daughter, at baseline oriented x 3  -CT head with no acute findings  -Likely secondary to UTI as above  -Continue  treatment for UTI, monitor neurological status    Failure to thrive/malnutrition/decreased p.o. intake  -SLP evaluated, recommend strict n.p.o.  -Core track and tube feeds ordered  -Palliative care consult for goals of care discussion\      Constipation  -CT scan showed moderate gaseous distention of the colon with moderate amount of  stool in the right colon and rectum.  -Ordered suppository, initiated on bowel regimen      Hypertension  -BP acutely stable  -Holding outpatient Tenoretic      Subcu heparin  for DVT prophylaxis.  Full code.  Discussed with patient, family, and nursing staff.  Anticipate discharge TBD      Copied text in this note has been reviewed and is accurate as of 03/26/24.         Dictated  utilizing Dragon dictation        Nomi Osman MD  [facility]  03/26/24  10:35 EDT

## 2024-03-26 NOTE — CONSULTS
"Nutrition Services    Patient Name:  Anthony Gallegos  YOB: 1945  MRN: [id number]  Admit Date:  3/25/2024    Assessment Date:  03/26/24    Summary: Nutrition consult for TF assessment/cortrak placement/MST score of 5 per nurse admission screen, chart skin report    Admitted with weakness, poor PO intake.  Lives in assisted living facility.  Discharged from hospital a few weeks ago.  FTT.  Not eating or drinking much, weight loss, decreased mobility.  Stage II pressure injury noted to coccyx.  S/p SLP evaluation today, NPO.  Palliative consulted.    Spoke with RN.  Visited patient at bedside.  I explained how placing the cortrak would go.  He would like to wait for his daughter to get here to discuss it with her first.    Weight loss noted - 36 lb (19.1%) x 2 months.  Significant weight loss.  Severe fat loss and muscle wasting noted upon nutrition focused physical exam.  Patient meets ASPEN/AND criteria for nutrition diagnosis of severe malnutrition of acute illness based on: nutrition focused physical exam, poor PO intake, weight loss.    RD to follow up next date to place cortrak if patient is agreeable at that time.    CLINICAL NUTRITION ASSESSMENT      Reason for Assessment Cortrak Placement, MST score 2+, Nurse or Nurse Practitioner Consult, Physician Consult, Pressure Injury and/or Non-Healing Wound, TF Assessment      Diagnosis/Problem   UTI   Medical/Surgical History Past Medical History:   Diagnosis Date    Abscess of scrotum     MARTITA (acute kidney injury)     Altered mental state     Arthritis     AS (ankylosing spondylitis)     Hypertension     Leukocytosis     Tetrahydrocannabinol (THC) use disorder, mild, abuse 12/20/2023    Uveitis        Past Surgical History:   Procedure Laterality Date    COLONOSCOPY      ROTATOR CUFF REPAIR Right     TOTAL HIP ARTHROPLASTY Left 2014        Anthropometrics        Current Height  Current Weight  BMI kg/m2 Height: 190.5 cm (75\")  Weight: 69.1 kg (152 lb " 6.4 oz) (03/25/24 2123)  Body mass index is 19.05 kg/m².   Adjusted BMI (if applicable)    BMI Category Normal/Healthy (18.4 - 24.9)   Ideal Body Weight (IBW) 196 lb (89.1 kg)   Usual Body Weight (UBW) 152-188 lb   Weight Trend Loss, Amount/Timeframe: 36 lb (19.1%) x 2 months   Weight History Wt Readings from Last 30 Encounters:   03/25/24 2123 69.1 kg (152 lb 6.4 oz)   03/25/24 1516 79 kg (174 lb 2.6 oz)   01/31/24 0639 79 kg (174 lb 3.2 oz)   01/29/24 0509 78.8 kg (173 lb 11.6 oz)   01/28/24 0530 77.7 kg (171 lb 4.8 oz)   01/25/24 0537 76.4 kg (168 lb 6.9 oz)   01/23/24 0542 73.2 kg (161 lb 6 oz)   01/22/24 0502 75.1 kg (165 lb 9.1 oz)   01/21/24 2304 75.5 kg (166 lb 7.2 oz)   01/21/24 1744 81.6 kg (180 lb)   01/08/24 0253 81.6 kg (179 lb 14.3 oz)   01/05/24 0440 87.9 kg (193 lb 12.6 oz)   01/04/24 0439 85.6 kg (188 lb 11.4 oz)   01/03/24 0513 82.5 kg (181 lb 14.1 oz)   12/31/23 1300 80.5 kg (177 lb 7.5 oz)   12/31/23 0350 83.8 kg (184 lb 11.9 oz)   12/30/23 0511 85.3 kg (188 lb 0.8 oz)   12/29/23 0341 85.7 kg (188 lb 15 oz)   12/28/23 0435 85.4 kg (188 lb 4.4 oz)   12/27/23 0755 81 kg (178 lb 9.2 oz)   12/27/23 0423 85.4 kg (188 lb 4.4 oz)   12/25/23 0707 85.2 kg (187 lb 13.3 oz)   12/23/23 0608 81.3 kg (179 lb 3.7 oz)   12/22/23 0430 81.8 kg (180 lb 5.4 oz)   12/20/23 1005 81.9 kg (180 lb 8 oz)   03/27/18 1439 100 kg (221 lb)   03/22/18 1613 101 kg (222 lb 12.8 oz)   01/23/18 1414 101 kg (223 lb)   12/26/17 1111 99.8 kg (220 lb)   10/19/17 1239 95.3 kg (210 lb)   08/30/17 1459 91.3 kg (201 lb 3.2 oz)   08/29/17 1221 93 kg (205 lb)   08/10/17 1517 88 kg (194 lb)   07/14/17 1041 85.9 kg (189 lb 6.4 oz)   06/29/17 1318 84.5 kg (186 lb 3.2 oz)   06/21/17 1922 90.7 kg (200 lb)      --  Estimated/Assessed Needs        Current Weight  Weight: 69.1 kg (152 lb 6.4 oz) (03/25/24 2123)       Energy Requirements    Weight for Calculation 152 lb (69.1 kg)   Method for Estimation  30-35 kcal/kg   EST Needs (kcal/day)  2073-2419       Protein Requirements    Weight for Calculation 152 lb (69.1 kg)   EST Protein Needs (g/kg) 1.2 - 1.5 gm/kg   EST Daily Needs (g/day)        Fluid Requirements     Method for Estimation 1 mL/kcal    EST Needs (mL/day)      Labs       Pertinent Labs    Results from last 7 days   Lab Units 03/26/24  0406 03/25/24  1723   SODIUM mmol/L 138 140   POTASSIUM mmol/L 4.8 4.9   CHLORIDE mmol/L 106 101   CO2 mmol/L 20.0* 24.0   BUN mg/dL 78* 82*   CREATININE mg/dL 2.30* 2.50*   CALCIUM mg/dL 8.9 9.7   BILIRUBIN mg/dL  --  0.6   ALK PHOS U/L  --  112   ALT (SGPT) U/L  --  15   AST (SGOT) U/L  --  16   GLUCOSE mg/dL 104* 125*     Results from last 7 days   Lab Units 03/26/24  1152 03/26/24  0406 03/25/24  1723   MAGNESIUM mg/dL 3.0*  --  2.6*   PHOSPHORUS mg/dL 5.6*  --   --    HEMOGLOBIN g/dL  --  13.0 13.2   HEMATOCRIT %  --  41.0 41.3   WBC 10*3/mm3  --  12.83* 11.85*   ALBUMIN g/dL  --   --  3.0*     Results from last 7 days   Lab Units 03/26/24  0406 03/25/24  1723   PLATELETS 10*3/mm3 422 471*     COVID19   Date Value Ref Range Status   03/25/2024 Not Detected Not Detected - Ref. Range Final     Lab Results   Component Value Date    HGBA1C 5.80 (H) 01/23/2024          Medications           Scheduled Medications bisacodyl, 10 mg, Rectal, Once  finasteride, 5 mg, Oral, Daily  heparin (porcine), 5,000 Units, Subcutaneous, Q8H  Menthol-Zinc Oxide, 1 Application, Topical, Q12H  pantoprazole, 40 mg, Oral, Q AM  piperacillin-tazobactam, 3.375 g, Intravenous, Q8H  senna-docusate sodium, 2 tablet, Oral, BID   And  polyethylene glycol, 17 g, Oral, BID  tamsulosin, 0.4 mg, Oral, Nightly       Infusions sodium chloride, 125 mL/hr, Last Rate: 125 mL/hr (03/26/24 1516)       PRN Medications   acetaminophen    senna-docusate sodium **AND** polyethylene glycol **AND** bisacodyl **AND** bisacodyl    melatonin    ondansetron ODT **OR** ondansetron    [COMPLETED] Insert Peripheral IV **AND** sodium chloride     Physical  Findings          General Findings confused, disoriented, loss of muscle mass, loss of subcutaneous fat, room air   Oral/Mouth Cavity WDL   Edema  no edema   Gastrointestinal hypoactive bowel sounds, non-distended    Skin  bruising, pressure injury: st II coccyx, other: abrasion   Tubes/Drains/Lines none   NFPE See Malnutrition Severity Assessment, Date Completed: 3/26   --  Malnutrition Severity Assessment      Patient meets criteria for : Severe Malnutrition  Malnutrition Type (Last 8 Hours)       Malnutrition Severity Assessment       Row Name 03/26/24 1650       Malnutrition Severity Assessment    Malnutrition Type Acute Disease or Injury - Related Malnutrition      Row Name 03/26/24 1650       Insufficient Energy Intake     Insufficient Energy Intake Findings Severe    Insufficient Energy Intake  <50% of est. energy requirement for >or equal to 1 month      Row Name 03/26/24 1650       Unintentional Weight Loss     Unintentional Weight Loss Findings Severe    Unintentional Weight Loss  Weight loss greater than 7.5% in three months      Row Name 03/26/24 1650       Muscle Loss    Loss of Muscle Mass Findings Severe    [place] Region Severe - deep hollowing/scooping, lack of muscle to touch, facial bones well defined    Clavicle Bone Region Severe - protruding prominent bone    Acromion Bone Region Severe - squared shoulders, bones, and acromion process protrusion prominent    Dorsal Hand Region Severe - prominent depression    Patellar Region Severe - prominent bone, square looking, very little muscle definition      Row Name 03/26/24 1650       Fat Loss    Subcutaneous Fat Loss Findings Severe    Orbital Region  Severe - pronounced hollowness/depression, dark circles, loose saggy skin    Upper Arm Region Severe - mostly skin, very little space between folds, fingers touch    Thoracic & Lumbar Region Severe - ribs visible with prominent depressions, iliac crest very prominent      Row Name 03/26/24 1650        Criteria Met (Must meet criteria for severity in at least 2 of these categories: M Wasting, Fat Loss, Fluid, Secondary Signs, Wt. Status, Intake)    Patient meets criteria for  Severe Malnutrition                     Current Nutrition Orders & Evaluation of Intake       Oral Nutrition     Food Allergies NKFA   Current PO Diet NPO Diet NPO Type: Strict NPO   Supplement n/a   PO Evaluation     % PO Intake N/a    Factors Affecting Intake: swallow impairment   --  PES STATEMENT / NUTRITION DIAGNOSIS      Nutrition Dx Problem  Problem: Malnutrition (severe)  Etiology: Factors Affecting Nutrition - poor PO, weakness, decreased mobility    Signs/Symptoms: NPO, Report of Minimal PO Intake, NFPE Results, Unintended Weight Change, and Report/Observation     NUTRITION INTERVENTION / PLAN OF CARE      Intervention Goal(s) Maintain nutrition status, Reduce/improve symptoms, Meet estimated needs, Disease management/therapy, Initiate feeding/diet, and Appropriate weight gain         RD Intervention/Action Await initiation/advancement of PO diet, Await initiation of EN/PN, Continue to monitor, and Care plan reviewed   --      Prescription/Orders:       PO Diet       Supplements       Enteral Nutrition       Parenteral Nutrition    New Prescription Ordered? No changes at this time   --      Monitor/Evaluation Per protocol, I&O, Pertinent labs, Weight, Skin status, Symptoms, POC/GOC, Swallow function   Discharge Plan/Needs Pending clinical course   --    RD to follow per protocol.      Electronically signed by:  Jewell Vera RD  03/26/24 16:40 EDT

## 2024-03-26 NOTE — NURSING NOTE
03/26/24 0915   Wound 12/20/23 1000 Bilateral coccyx   Placement Date/Time: 12/20/23 1000   Present on Original Admission: Yes  Side: Bilateral  Location: coccyx   Pressure Injury Stage 2   Dressing Appearance moist drainage   Closure None   Base non-blanchable;moist;red   Periwound redness   Periwound Temperature warm   Periwound Skin Turgor soft   Edges open   Skin Interventions   Pressure Reduction Devices specialty bed utilized  ([facility] once he is out of this unit)   Pressure Reduction Techniques heels elevated off bed;positioned off wounds;pressure points protected   Skin Protection silicone foam dressing in place     Wound/Ostomy: Consult received regarding skin problems on the Coccyx. Patient is alert, upon assessment is observed partial-thickness skin loss, small amount of serous drainage, Pressure injury stage 2 POA.  He is at risk for further skin problems, is completely bedridden, contracted, impaired mobility and is incontinent, repositioning him every two hours and minimizing any skin contact with urine or stool would be beneficial, protective padding was placed over bone prominences and bilateral heels.  Wound care order and pressure ulcer preventives  measures have been implemented into Epic.   He will need Low air loss mattress for adequate pressure redistribution and pressure relief once he is out of this unit. Rn notified.  Please re-consult for any additional needs.

## 2024-03-26 NOTE — H&P
HISTORY AND PHYSICAL   [facility]        Date of Admission: 3/25/2024  Patient Identification:  Name: Anthony Gallegos  Age: 79 y.o.  Sex: male  :  1945  MRN: [id number]                     Primary Care Physician: Provider, No Known    Chief Complaint:  79 year old gentleman presented to the emergency room with weakness and poor po intake; he lives in assisted living; [facility] ed [facility]; he has lost quite  a bit of weight and has become weaker and weaker; he had a chronic forde after dc from the hospital a few weeks ago which was recently removed;     History of Present Illness:   As above    Past Medical History:  Past Medical History:   Diagnosis Date    Abscess of scrotum     MARTITA (acute kidney injury)     Altered mental state     Arthritis     AS (ankylosing spondylitis)     Hypertension     Leukocytosis     Tetrahydrocannabinol (THC) use disorder, mild, abuse 2023    Uveitis      Past Surgical History:  Past Surgical History:   Procedure Laterality Date    COLONOSCOPY      ROTATOR CUFF REPAIR Right     TOTAL HIP ARTHROPLASTY Left       Home Meds:  Medications Prior to Admission   Medication Sig Dispense Refill Last Dose    acetaminophen (TYLENOL) 325 MG tablet Take 2 tablets by mouth Every 4 (Four) Hours As Needed for Mild Pain.       atenolol-chlorthalidone (TENORETIC) 50-25 MG per tablet Take 1 tablet by mouth Daily.       finasteride (PROSCAR) 5 MG tablet Take 1 tablet by mouth Daily.       melatonin 3 MG tablet Take 1 tablet by mouth At Night As Needed for Sleep.       methotrexate 2.5 MG tablet Take 1 tablet by mouth 2 (Two) Times a Week. Pt takes every Wednesday and thursday  5     Omega-3 Fatty Acids (OMEGA 3 PO) Take 1 capsule by mouth Daily.       pantoprazole (PROTONIX) 40 MG EC tablet Take 1 tablet by mouth Every Morning.       tamsulosin (FLOMAX) 0.4 MG capsule 24 hr capsule Take 1 capsule by mouth Every Night. 30  "capsule 1        Allergies:  No Known Allergies  Immunizations:  Immunization History   Administered Date(s) Administered    COVID-19 (MODERNA) BIVALENT 12+YRS 2022    COVID-19 (MODERNA) Monovalent Original Booster 2021    COVID-19 (PFIZER) Purple Cap Monovalent 2021, 2021    Fluad Quad 65+ 10/07/2023    Pneumococcal Conjugate 13-Valent (PCV13) 2019    Pneumococcal Polysaccharide (PPSV23) 2020    Shingrix 2018    Tdap 2009, 2015, 2020     Social History:   Social History     Social History Narrative    Not on file     Social History     Socioeconomic History    Marital status:     Number of children: 2   Tobacco Use    Smoking status: Never    Smokeless tobacco: Never   Vaping Use    Vaping status: Never Used   Substance and Sexual Activity    Alcohol use: No    Drug use: No    Sexual activity: Defer       Family History:  Family History   Problem Relation Age of Onset    Alzheimer's disease Mother         Review of Systems  Not obtainable from the patient    Objective:  T Max 24 hrs: Temp (24hrs), Av.3 °F (36.8 °C), Min:98.3 °F (36.8 °C), Max:98.3 °F (36.8 °C)    Vitals Ranges:   Temp:  [98.3 °F (36.8 °C)] 98.3 °F (36.8 °C)  Heart Rate:  [79-85] 80  Resp:  [16-20] 16  BP: (110-125)/(55-79) 111/67      Exam:  /67 (BP Location: Left arm, Patient Position: Lying)   Pulse 80   Temp 98.3 °F (36.8 °C) (Tympanic)   Resp 16   Ht 190.5 cm (75\")   Wt 69.1 kg (152 lb 6.4 oz)   SpO2 98%   BMI 19.05 kg/m²     General Appearance:    Drowsy, thin, frail, chronically ill appearing   Head:    Normocephalic, without obvious abnormality, atraumatic; bitemporal wasting   Eyes:    PERRL, conjunctivae/corneas clear, EOM's intact, both eyes   Ears:    Normal external ear canals, both ears   Nose:   Nares normal, septum midline, mucosa normal, no drainage    or sinus tenderness   Throat:   Lips, mucosa, and tongue normal   Neck:   Supple, symmetrical, " trachea midline, no adenopathy;     thyroid:  no enlargement/tenderness/nodules; no carotid    bruit or JVD   Back:     Symmetric, no curvature, ROM normal, no CVA tenderness   Lungs:     Decreased breath sounds bilaterally, respirations unlabored   Chest Wall:    No tenderness or deformity    Heart:    Regular rate and rhythm, S1 and S2 normal, no murmur, rub   or gallop   Abdomen:     Soft, nontender, bowel sounds active all four quadrants,     no masses, no hepatomegaly, no splenomegaly   Extremities:   Extremities normal, atraumatic, no cyanosis or edema                       .    Data Review:  Labs in chart were reviewed.  WBC   Date Value Ref Range Status   03/25/2024 11.85 (H) 3.40 - 10.80 10*3/mm3 Final     Hemoglobin   Date Value Ref Range Status   03/25/2024 13.2 13.0 - 17.7 g/dL Final     Hematocrit   Date Value Ref Range Status   03/25/2024 41.3 37.5 - 51.0 % Final     Platelets   Date Value Ref Range Status   03/25/2024 471 (H) 140 - 450 10*3/mm3 Final     Sodium   Date Value Ref Range Status   03/25/2024 140 136 - 145 mmol/L Final     Potassium   Date Value Ref Range Status   03/25/2024 4.9 3.5 - 5.2 mmol/L Final     Chloride   Date Value Ref Range Status   03/25/2024 101 98 - 107 mmol/L Final     CO2   Date Value Ref Range Status   03/25/2024 24.0 22.0 - 29.0 mmol/L Final     BUN   Date Value Ref Range Status   03/25/2024 82 (H) 8 - 23 mg/dL Final     Creatinine   Date Value Ref Range Status   03/25/2024 2.50 (H) 0.76 - 1.27 mg/dL Final     Glucose   Date Value Ref Range Status   03/25/2024 125 (H) 65 - 99 mg/dL Final     Calcium   Date Value Ref Range Status   03/25/2024 9.7 8.6 - 10.5 mg/dL Final     Magnesium   Date Value Ref Range Status   03/25/2024 2.6 (H) 1.6 - 2.4 mg/dL Final       Results from last 7 days   Lab Units 03/25/24  1723   TSH uIU/mL 2.190          Imaging Results (All)       Procedure Component Value Units Date/Time    CT Abdomen Pelvis Without Contrast [[id number]] Collected:  03/25/24 1957     Updated: 03/25/24 2001    Narrative:      CT OF THE ABDOMEN AND PELVIS WITHOUT CONTRAST 03/25/2024     HISTORY: Abdominal pain.     Spiral images were obtained from the lung bases to the symphysis pubis.  No intravenous or oral contrast was given.     There is streak artifact from patient's arms. The liver, gallbladder and  spleen appear unremarkable. Pancreas appears somewhat atrophic. The  adrenals appear unremarkable. Kidneys are obscured by streak artifact  but there may be some mild hydronephrosis bilaterally, greater on the  right than on the left and there may be at least 1 right renal cyst such  as on image 46.     There is moderate gaseous distention of the colon with moderate amount  of stool in the right colon. No small bowel dilatation is seen. Urinary  bladder is moderately distended. Multiple fluid collections are seen  adjacent to the superior aspect of the urinary bladder and these may  represent multiple urinary bladder diverticula but are somewhat more  numerous than on the previous study of 01/23/2024. There is wall  thickening of the urinary bladder as well. Left hip prosthesis produces  streak artifact and obscures the pelvis as well.       Impression:      1. Some of the images are obscured by streak artifact from patient's  arms and left hip prosthesis.  2. Kidneys are somewhat obscured but there may be mild-to-moderate  bilateral hydronephrosis and at least 1 right renal cyst.  3. Moderate gaseous distention of the colon with moderate amount of  stool in the right colon and rectum.  4. 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 These appear more extensive than on the 01/23/2024  study. Please correlate for urinary bladder outlet obstruction/partial  obstruction.     Radiation dose reduction techniques were utilized, including automated  exposure control and exposure modulation based on body size.          CT Head Without  Contrast [[id number]] Collected: 03/25/24 1835     Updated: 03/25/24 1835    Narrative:      CT OF THE BRAIN WITHOUT CONTRAST 03/25/2024     HISTORY: Mental status change.     Axial images were obtained through the brain without intravenous  contrast.     There is moderate diffuse atrophy. There is decreased attenuation of the  periventricular white matter bilaterally consistent with small vessel  white matter ischemic disease. There is no evidence of acute infarction,  hemorrhage, midline shift or mass effect.     No bony abnormalities are seen.       Impression:      No acute process identified.     Radiation dose reduction techniques were utilized, including automated  exposure control and exposure modulation based on body size.          XR Chest 1 View [[id number]] Collected: 03/25/24 1756     Updated: 03/25/24 1809    Narrative:      XR CHEST 1 VW-3/25/2024     HISTORY: Shortness of breath.     Heart size is within normal limits. Lungs appear free of acute  infiltrates. There is mild vascular congestion. There is some aortic  calcification.       Impression:      1. No acute process except for mild vascular congestion.        This report was finalized on 3/25/2024 6:06 PM by Dr. Dario Dumont M.D on Workstation: [id number]                 Assessment:  Active Hospital Problems    Diagnosis  POA    **UTI (urinary tract infection) [N39.0]  Yes      Resolved Hospital Problems   No resolved problems to display.   Sepsis  Underweight  Ankylosing spondylitis  Acute kidney injury  Weight loss  weakness    Plan:  Will continue antibiotics, fluids  Monitor on telemetry  Await cultures  Ask nephrology to see him  Dw patient, family and ed provider    Casie Reardon MD  3/25/2024  23:31 EDT

## 2024-03-26 NOTE — PROGRESS NOTES
[facility]: Piperacillin-Tazobactam Consult    Pt Name: Anthony Gallegos   : 1945    Indication: Sepsis and Urinary Tract Infection    Relevant clinical data and objective history reviewed:    Past Medical History:   Diagnosis Date    Abscess of scrotum     MARTITA (acute kidney injury)     Altered mental state     Arthritis     AS (ankylosing spondylitis)     Hypertension     Leukocytosis     Tetrahydrocannabinol (THC) use disorder, mild, abuse 2023    Uveitis      Creatinine   Date Value Ref Range Status   2024 2.50 (H) 0.76 - 1.27 mg/dL Final     BUN   Date Value Ref Range Status   2024 82 (H) 8 - 23 mg/dL Final     Estimated Creatinine Clearance: 23 mL/min (A) (by C-G formula based on SCr of 2.5 mg/dL (H)).    Lab Results   Component Value Date    WBC 11.85 (H) 2024     Temp Readings from Last 3 Encounters:   24 98.3 °F (36.8 °C) (Tympanic)   24 98.6 °F (37 °C) (Oral)   24 98.2 °F (36.8 °C) (Oral)      Assessment/Plan  Estimated CrCl >20 mL/min at this time; BMI 19 kg/m2  Will start piperacillin-tazobactam 3.375 g IV every 8 hours     Pharmacy will continue to follow daily while on piperacillin-tazobactam and adjust as needed. Thank you for this consult.    Kate Andres [facility]  Clinical Pharmacist

## 2024-03-26 NOTE — CONSULTS
[facility]      Patient Identification:  NAME:  Anthony Gallegos  Age:  79 y.o.   Sex:  male   :  1945   MRN:  [id number]     Chief complaint: Generalized weakness    History of present illness:      Anthony Gallegos is a 79 y.o. male with a history of urinary retention, hydronephrosis, BPH with LUTS and gross hematuria who presented to the ER on 3/25/24 with generalized weakness and decreased oral intake. Patient has been seen previously for urinary retention  and  requiring a prolonged indwelling forde catheter. Patient was discharged with catheter in place which has recently been removed. Pt diagnosed and admitted to the hospital with urinary tract infection. Bladder scan results yielded 349 cc in bladder. Urology was consulted for evaluation and treatment of urinary retention.  Pt has a significant history of retention. Patient currently sees Dr. Jimenez with [facility]. Seen in February with attempted voiding trial in our office but did not pass. A forde catheter was replaced by our [facility]. Currently on Flomax 0.4 mg QD.   Denies hematuria, dysuria. ,fever, flank pain, nausea or vomiting. Nurse taking care of patient reports that during bladder scan patient was incontinent of urine. Patient refused forde catheter when nurse went to insert.     In hospital:  -AVSS, good UOP  -WBC - 12.83  -Creat - 2.30  -UA - Large leukocytes, negative nitrites, 3-5 RBC, TNTC WBC, 4+ bacteria  -Blood culture- Pending    -CT - Independently reviewed- Mild bilateral hydronephrosis, right renal cyst, bladder wall thickening, several bladder diverticulum seen    Past medical history:  Past Medical History:   Diagnosis Date    Abscess of scrotum     MARTITA (acute kidney injury)     Altered mental state     Arthritis     AS (ankylosing spondylitis)     Hypertension     Leukocytosis     Tetrahydrocannabinol (THC) use disorder, mild, abuse 2023    Uveitis        Past surgical  history:  Past Surgical History:   Procedure Laterality Date    COLONOSCOPY      ROTATOR CUFF REPAIR Right     TOTAL HIP ARTHROPLASTY Left 2014       Allergies:  Patient has no known allergies.    Home medications:  Medications Prior to Admission   Medication Sig Dispense Refill Last Dose    acetaminophen (TYLENOL) 325 MG tablet Take 2 tablets by mouth Every 4 (Four) Hours As Needed for Mild Pain.       atenolol-chlorthalidone (TENORETIC) 50-25 MG per tablet Take 1 tablet by mouth Daily.       finasteride (PROSCAR) 5 MG tablet Take 1 tablet by mouth Daily.       melatonin 3 MG tablet Take 1 tablet by mouth At Night As Needed for Sleep.       methotrexate 2.5 MG tablet Take 1 tablet by mouth 2 (Two) Times a Week. Pt takes every Wednesday and thursday  5     Omega-3 Fatty Acids (OMEGA 3 PO) Take 1 capsule by mouth Daily.       pantoprazole (PROTONIX) 40 MG EC tablet Take 1 tablet by mouth Every Morning.       tamsulosin (FLOMAX) 0.4 MG capsule 24 hr capsule Take 1 capsule by mouth Every Night. 30 capsule 1         Hospital medications:  finasteride, 5 mg, Oral, Daily  pantoprazole, 40 mg, Oral, Q AM  piperacillin-tazobactam, 3.375 g, Intravenous, Q8H  tamsulosin, 0.4 mg, Oral, Nightly      Pharmacy to Dose Zosyn,   sodium chloride, 125 mL/hr, Last Rate: 125 mL/hr (03/26/24 0000)        acetaminophen    senna-docusate sodium **AND** polyethylene glycol **AND** bisacodyl **AND** bisacodyl    melatonin    ondansetron ODT **OR** ondansetron    Pharmacy to Dose Zosyn    [COMPLETED] Insert Peripheral IV **AND** sodium chloride    Family history:  Family History   Problem Relation Age of Onset    Alzheimer's disease Mother        Social history:  Social History     Tobacco Use    Smoking status: Never    Smokeless tobacco: Never   Vaping Use    Vaping status: Never Used   Substance Use Topics    Alcohol use: No    Drug use: No       Review of systems:      12 point negative except as in HPI    Objective:  TMax 24 hours:    Temp (24hrs), Av.1 °F (36.7 °C), Min:97.9 °F (36.6 °C), Max:98.3 °F (36.8 °C)      Vitals Ranges:   Temp:  [97.9 °F (36.6 °C)-98.3 °F (36.8 °C)] 97.9 °F (36.6 °C)  Heart Rate:  [79-85] 81  Resp:  [14-20] 16  BP: ()/(54-79) 120/68    Intake/Output Last 3 shifts:  No intake/output data recorded.     Physical Exam:    General Appearance:    NAD, chronically ill-appearing   Back:     No CVA tenderness   Lungs:     Respirations unlabored, no wheezing   Abdomen:     Distended, firm, non-tender   :    Bladder distended, tender     Results review:   I reviewed the patient's new clinical results.    Data review:  Lab Results (last 24 hours)       Procedure Component Value Units Date/Time    Basic Metabolic Panel [[id number]]  (Abnormal) Collected: 24    Specimen: Blood Updated: 24     Glucose 104 mg/dL      BUN 78 mg/dL      Creatinine 2.30 mg/dL      Sodium 138 mmol/L      Potassium 4.8 mmol/L      Comment: Slight hemolysis detected by analyzer. Result may be falsely elevated.        Chloride 106 mmol/L      CO2 20.0 mmol/L      Calcium 8.9 mg/dL      BUN/Creatinine Ratio 33.9     Anion Gap 12.0 mmol/L      eGFR 28.0 mL/min/1.73     Narrative:      GFR Normal >60  Chronic Kidney Disease <60  Kidney Failure <15    The GFR formula is only valid for adults with stable renal function between ages 18 and 70.    CBC (No Diff) [[id number]]  (Abnormal) Collected: 24    Specimen: Blood Updated: 24     WBC 12.83 10*3/mm3      RBC 4.79 10*6/mm3      Hemoglobin 13.0 g/dL      Hematocrit 41.0 %      MCV 85.6 fL      MCH 27.1 pg      MCHC 31.7 g/dL      RDW 12.9 %      RDW-SD 39.8 fl      MPV 9.3 fL      Platelets 422 10*3/mm3     STAT Lactic Acid, Reflex [[id number]]  (Normal) Collected: 03/25/24 2303    Specimen: Blood from Arm, Left Updated: 24 2330     Lactate 1.8 mmol/L     High Sensitivity Troponin T 2Hr [[id number]]  (Abnormal) Collected: 24    Specimen: Blood  from Arm, Left Updated: 03/25/24 2040     HS Troponin T 45 ng/L      Troponin T Delta -6 ng/L     Narrative:      High Sensitive Troponin T Reference Range:  <14.0 ng/L- Negative Female for AMI  <22.0 ng/L- Negative Male for AMI  >=14 - Abnormal Female indicating possible myocardial injury.  >=22 - Abnormal Male indicating possible myocardial injury.   Clinicians would have to utilize clinical acumen, EKG, Troponin, and serial changes to determine if it is an Acute Myocardial Infarction or myocardial injury due to an underlying chronic condition.         STAT Lactic Acid, Reflex [[id number]]  (Abnormal) Collected: 03/25/24 1956    Specimen: Blood from Arm, Right Updated: 03/25/24 2035     Lactate 2.1 mmol/L     Blood Culture - Blood, Arm, Left [[id number]] Collected: 03/25/24 2010    Specimen: Blood from Arm, Left Updated: 03/25/24 2015    Urinalysis With Microscopic If Indicated (No Culture) - Straight Cath [[id number]]  (Abnormal) Collected: 03/25/24 1918    Specimen: Urine from Straight Cath Updated: 03/25/24 2015     Color, UA Yellow     Appearance, UA Turbid     pH, UA 7.0     Specific Gravity, UA 1.011     Glucose, UA Negative     Ketones, UA Negative     Bilirubin, UA Negative     Blood, UA Large (3+)     Protein, UA >=300 mg/dL (3+)     Leuk Esterase, UA Large (3+)     Nitrite, UA Negative     Urobilinogen, UA 0.2 E.U./dL    Urinalysis, Microscopic Only - Straight Cath [[id number]]  (Abnormal) Collected: 03/25/24 1918    Specimen: Urine from Straight Cath Updated: 03/25/24 2015     RBC, UA 3-5 /HPF      WBC, UA Too Numerous to Count /HPF      Bacteria, UA 4+ /HPF      Squamous Epithelial Cells, UA None Seen /HPF      Hyaline Casts, UA None Seen /LPF      Methodology Manual Light Microscopy    Blood Culture - Blood, Arm, Right [[id number]] Collected: 03/25/24 1956    Specimen: Blood from Arm, Right Updated: 03/25/24 2002    Respiratory Panel PCR w/COVID-19(SARS-CoV-2) CALDERON/TRAM/KELLE/PAD/COR/JOSEPH In-House, NP Swab in  UTM/VTM, 2 HR TAT - Swab, Nasopharynx [[id number]]  (Normal) Collected: 03/25/24 1722    Specimen: Swab from Nasopharynx Updated: 03/25/24 1927     ADENOVIRUS, PCR Not Detected     Coronavirus 229E Not Detected     Coronavirus HKU1 Not Detected     Coronavirus NL63 Not Detected     Coronavirus OC43 Not Detected     COVID19 Not Detected     Human Metapneumovirus Not Detected     Human Rhinovirus/Enterovirus Not Detected     Influenza A PCR Not Detected     Influenza B PCR Not Detected     Parainfluenza Virus 1 Not Detected     Parainfluenza Virus 2 Not Detected     Parainfluenza Virus 3 Not Detected     Parainfluenza Virus 4 Not Detected     RSV, PCR Not Detected     Bordetella pertussis pcr Not Detected     Bordetella parapertussis PCR Not Detected     Chlamydophila pneumoniae PCR Not Detected     Mycoplasma pneumo by PCR Not Detected    Narrative:      In the setting of a positive respiratory panel with a viral infection PLUS a negative procalcitonin without other underlying concern for bacterial infection, consider observing off antibiotics or discontinuation of antibiotics and continue supportive care. If the respiratory panel is positive for atypical bacterial infection (Bordetella pertussis, Chlamydophila pneumoniae, or Mycoplasma pneumoniae), consider antibiotic de-escalation to target atypical bacterial infection.    [place] Draw [[id number]] Collected: 03/25/24 1723    Specimen: Blood Updated: 03/25/24 1832    Narrative:      The following orders were created for panel order [place] Draw.  Procedure                               Abnormality         Status                     ---------                               -----------         ------                     Green Top (Gel)[[id number]]                                  Final result               Lavender Top[[id number]]                                     Final result               Gold Top - SST[[id number]]                                   Final result                Light Blue Top[[id number]]                                   Final result                 Please view results for these tests on the individual orders.    Green Top (Gel) [[id number]] Collected: 03/25/24 1723    Specimen: Blood Updated: 03/25/24 1832     Extra Tube Hold for add-ons.     Comment: Auto resulted.       Lavender Top [[id number]] Collected: 03/25/24 1723    Specimen: Blood Updated: 03/25/24 1832     Extra Tube hold for add-on     Comment: Auto resulted       Gold Top - SST [[id number]] Collected: 03/25/24 1723    Specimen: Blood Updated: 03/25/24 1832     Extra Tube Hold for add-ons.     Comment: Auto resulted.       Light Blue Top [[id number]] Collected: 03/25/24 1723    Specimen: Blood Updated: 03/25/24 1832     Extra Tube Hold for add-ons.     Comment: Auto resulted       Lactic Acid, Plasma [[id number]]  (Abnormal) Collected: 03/25/24 1723    Specimen: Blood Updated: 03/25/24 1827     Lactate 2.4 mmol/L     BNP [[id number]]  (Normal) Collected: 03/25/24 1723    Specimen: Blood Updated: 03/25/24 1826     proBNP 783.0 pg/mL     Narrative:      This assay is used as an aid in the diagnosis of individuals suspected of having heart failure. It can be used as an aid in the diagnosis of acute decompensated heart failure (ADHF) in patients presenting with signs and symptoms of ADHF to the emergency department (ED). In addition, NT-proBNP of <300 pg/mL indicates ADHF is not likely.    Age Range Result Interpretation  NT-proBNP Concentration (pg/mL:      <50             Positive            >450                   Gray                 300-450                    Negative             <300    50-75           Positive            >900                  Gray                300-900                  Negative            <300      >75             Positive            >1800                  Gray                300-1800                  Negative            <300    High Sensitivity Troponin T [[id number]]  (Abnormal) Collected:  "03/25/24 1723    Specimen: Blood Updated: 03/25/24 1826     HS Troponin T 51 ng/L     Narrative:      High Sensitive Troponin T Reference Range:  <14.0 ng/L- Negative Female for AMI  <22.0 ng/L- Negative Male for AMI  >=14 - Abnormal Female indicating possible myocardial injury.  >=22 - Abnormal Male indicating possible myocardial injury.   Clinicians would have to utilize clinical acumen, EKG, Troponin, and serial changes to determine if it is an Acute Myocardial Infarction or myocardial injury due to an underlying chronic condition.         Procalcitonin [[id number]]  (Abnormal) Collected: 03/25/24 1723    Specimen: Blood Updated: 03/25/24 1826     Procalcitonin 3.36 ng/mL     Narrative:      As a Marker for Sepsis (Non-Neonates):    1. <0.5 ng/mL represents a low risk of severe sepsis and/or septic shock.  2. >2 ng/mL represents a high risk of severe sepsis and/or septic shock.    As a Marker for Lower Respiratory Tract Infections that require antibiotic therapy:    PCT on Admission    Antibiotic Therapy       6-12 Hrs later    >0.5                Strongly Recommended  >0.25 - <0.5        Recommended   0.1 - 0.25          Discouraged              Remeasure/reassess PCT  <0.1                Strongly Discouraged     Remeasure/reassess PCT    As 28 day mortality risk marker: \"Change in Procalcitonin Result\" (>80% or <=80%) if Day 0 (or Day 1) and Day 4 values are available. Refer to http://[email or web address]-pct-calculator.com    Change in PCT <=80%  A decrease of PCT levels below or equal to 80% defines a positive change in PCT test result representing a higher risk for 28-day all-cause mortality of patients diagnosed with severe sepsis for septic shock.    Change in PCT >80%  A decrease of PCT levels of more than 80% defines a negative change in PCT result representing a lower risk for 28-day all-cause mortality of patients diagnosed with severe sepsis or septic shock.       TSH [[id number]]  (Normal) Collected: 03/25/24 " 1723    Specimen: Blood Updated: 03/25/24 1826     TSH 2.190 uIU/mL     Comprehensive Metabolic Panel [[id number]]  (Abnormal) Collected: 03/25/24 1723    Specimen: Blood Updated: 03/25/24 1823     Glucose 125 mg/dL      BUN 82 mg/dL      Creatinine 2.50 mg/dL      Sodium 140 mmol/L      Potassium 4.9 mmol/L      Chloride 101 mmol/L      CO2 24.0 mmol/L      Calcium 9.7 mg/dL      Total Protein 9.5 g/dL      Albumin 3.0 g/dL      ALT (SGPT) 15 U/L      AST (SGOT) 16 U/L      Alkaline Phosphatase 112 U/L      Total Bilirubin 0.6 mg/dL      Globulin 6.5 gm/dL      A/G Ratio 0.5 g/dL      BUN/Creatinine Ratio 32.8     Anion Gap 15.0 mmol/L      eGFR 25.3 mL/min/1.73     Narrative:      GFR Normal >60  Chronic Kidney Disease <60  Kidney Failure <15    The GFR formula is only valid for adults with stable renal function between ages 18 and 70.    Lipase [[id number]]  (Abnormal) Collected: 03/25/24 1723    Specimen: Blood Updated: 03/25/24 1823     Lipase 62 U/L     CK [[id number]]  (Normal) Collected: 03/25/24 1723    Specimen: Blood Updated: 03/25/24 1823     Creatine Kinase 29 U/L     Magnesium [[id number]]  (Abnormal) Collected: 03/25/24 1723    Specimen: Blood Updated: 03/25/24 1823     Magnesium 2.6 mg/dL     Manual Differential [[id number]]  (Abnormal) Collected: 03/25/24 1723    Specimen: Blood Updated: 03/25/24 1820     Neutrophil % 73.5 %      Lymphocyte % 14.3 %      Monocyte % 12.2 %      Neutrophils Absolute 8.71 10*3/mm3      Lymphocytes Absolute 1.69 10*3/mm3      Monocytes Absolute 1.45 10*3/mm3      RBC Morphology Normal     WBC Morphology Normal     Platelet Morphology Normal    CBC & Differential [[id number]]  (Abnormal) Collected: 03/25/24 1723    Specimen: Blood Updated: 03/25/24 1820    Narrative:      The following orders were created for panel order CBC & Differential.  Procedure                               Abnormality         Status                     ---------                                -----------         ------                     CBC Auto Differential[[id number]]        Abnormal            Final result                 Please view results for these tests on the individual orders.    CBC Auto Differential [[id number]]  (Abnormal) Collected: 03/25/24 1723    Specimen: Blood Updated: 03/25/24 1820     WBC 11.85 10*3/mm3      RBC 4.79 10*6/mm3      Hemoglobin 13.2 g/dL      Hematocrit 41.3 %      MCV 86.2 fL      MCH 27.6 pg      MCHC 32.0 g/dL      RDW 13.1 %      RDW-SD 41.3 fl      MPV 9.4 fL      Platelets 471 10*3/mm3              Imaging:  Imaging Results (Last 24 Hours)       Procedure Component Value Units Date/Time    CT Abdomen Pelvis Without Contrast [[id number]] Collected: 03/25/24 1957     Updated: 03/25/24 2001    Narrative:      CT OF THE ABDOMEN AND PELVIS WITHOUT CONTRAST 03/25/2024     HISTORY: Abdominal pain.     Spiral images were obtained from the lung bases to the symphysis pubis.  No intravenous or oral contrast was given.     There is streak artifact from patient's arms. The liver, gallbladder and  spleen appear unremarkable. Pancreas appears somewhat atrophic. The  adrenals appear unremarkable. Kidneys are obscured by streak artifact  but there may be some mild hydronephrosis bilaterally, greater on the  right than on the left and there may be at least 1 right renal cyst such  as on image 46.     There is moderate gaseous distention of the colon with moderate amount  of stool in the right colon. No small bowel dilatation is seen. Urinary  bladder is moderately distended. Multiple fluid collections are seen  adjacent to the superior aspect of the urinary bladder and these may  represent multiple urinary bladder diverticula but are somewhat more  numerous than on the previous study of 01/23/2024. There is wall  thickening of the urinary bladder as well. Left hip prosthesis produces  streak artifact and obscures the pelvis as well.       Impression:      1. Some of the images are  obscured by streak artifact from patient's  arms and left hip prosthesis.  2. Kidneys are somewhat obscured but there may be mild-to-moderate  bilateral hydronephrosis and at least 1 right renal cyst.  3. Moderate gaseous distention of the colon with moderate amount of  stool in the right colon and rectum.  4. 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 These appear more extensive than on the 01/23/2024  study. Please correlate for urinary bladder outlet obstruction/partial  obstruction.     Radiation dose reduction techniques were utilized, including automated  exposure control and exposure modulation based on body size.          CT Head Without Contrast [[id number]] Collected: 03/25/24 1835     Updated: 03/25/24 1835    Narrative:      CT OF THE BRAIN WITHOUT CONTRAST 03/25/2024     HISTORY: Mental status change.     Axial images were obtained through the brain without intravenous  contrast.     There is moderate diffuse atrophy. There is decreased attenuation of the  periventricular white matter bilaterally consistent with small vessel  white matter ischemic disease. There is no evidence of acute infarction,  hemorrhage, midline shift or mass effect.     No bony abnormalities are seen.       Impression:      No acute process identified.     Radiation dose reduction techniques were utilized, including automated  exposure control and exposure modulation based on body size.          XR Chest 1 View [[id number]] Collected: 03/25/24 1756     Updated: 03/25/24 1809    Narrative:      XR CHEST 1 VW-3/25/2024     HISTORY: Shortness of breath.     Heart size is within normal limits. Lungs appear free of acute  infiltrates. There is mild vascular congestion. There is some aortic  calcification.       Impression:      1. No acute process except for mild vascular congestion.        This report was finalized on 3/25/2024 6:06 PM by Dr. Dario Dumont M.D on Workstation:  [id number]                Assessment:     Urinary retention  Urinary tract infection  MARTITA    Plan:   - No acute urologic surgical intervention recommended.  - Increase Tamsulosin 0.8 mg QD and continue Finasteride  - Bladder scan x 1 - 349 cc  - Insert indwelling catheter. Spoke with patient and daughter. Daughter agrees with forde catheter insertion. Patient willing if daughter agrees with plan.   - Continue Zosyn  - Will monitor Cr   - Recommend catheter remain in place at discharge given patient's MARTITA and urinary tract infection  - Voiding trial appropriate as an outpatient in our office  - Will plan for RASHEED to evaluate for hydronephrosis in 2-3 days or as an outpatient depending on length of stay  - Urology will follow

## 2024-03-26 NOTE — PLAN OF CARE
"Goal Outcome Evaluation:              Outcome Evaluation: Clinical swallow evaluation completed. Vocal quality difficult to assess; mostly grunting during eval. Patient with coughing after x3/3 puree trials. Audible swallows with thin liquid by cup/straw. No overt s/s of aspiration with ice chips. Patient stated he was \"full\" and wanted to discontinue trials following thin and puree trials. Recommend NPO. Meds via alternate route. Ice chips OK after oral care. Consider goals of care discussion. Speech to follow for re-eval.                                "

## 2024-03-26 NOTE — PLAN OF CARE
Problem: Adult Inpatient Plan of Care  Goal: Plan of Care Review  Outcome: Ongoing, Not Progressing  Flowsheets (Taken 3/26/2024 5419)  Progress: declining  Plan of Care Reviewed With: patient   Goal Outcome Evaluation:  Plan of Care Reviewed With: patient        Progress: declining

## 2024-03-26 NOTE — CASE MANAGEMENT/SOCIAL WORK
Discharge Planning Assessment  [facility]     Patient Name: Anthony Gallegos  MRN: [id number]  Today's Date: 3/26/2024    Admit Date: 3/25/2024    Plan: Return to Enhanced Living at [facility]. PT/OT onsite   Discharge Needs Assessment       Row Name 03/26/24 1058       Living Environment    People in Home facility resident    Current Living Arrangements assisted living facility    Potentially Unsafe Housing Conditions none    In the past 12 months has the electric, gas, oil, or water company threatened to shut off services in your home? No    Primary Care Provided by self;other (see comments)  staff    Provides Primary Care For no one    Family Caregiver if Needed child(winston), adult    Quality of Family Relationships unable to assess    Able to Return to Prior Arrangements yes       Resource/Environmental Concerns    Resource/Environmental Concerns none       Transportation Needs    In the past 12 months, has lack of transportation kept you from medical appointments or from getting medications? no    In the past 12 months, has lack of transportation kept you from meetings, work, or from getting things needed for daily living? No       Food Insecurity    Within the past 12 months, you worried that your food would run out before you got the money to buy more. Never true    Within the past 12 months, the food you bought just didn't last and you didn't have money to get more. Never true       Transition Planning    Patient/Family Anticipates Transition to other (see comments)  assisted living    Patient/Family Anticipated Services at Transition home health care       Discharge Needs Assessment    Readmission Within the Last 30 Days no previous admission in last 30 days    Current Outpatient/Agency/Support Group homecare agency    Equipment Currently Used at Home walker, jocelyn;wheelchair    Concerns to be Addressed basic needs;discharge planning    Anticipated Changes Related to Illness none    Equipment Needed After  Discharge none    Outpatient/Agency/Support Group Needs homecare agency    Discharge Facility/Level of Care Needs home with home health    Provided Post Acute Provider List? N/A                   Discharge Plan       Row Name 03/26/24 1059       Plan    Plan Return to [facility] Living at [facility]. PT/OT onsite    Patient/Family in Agreement with Plan yes    Plan Comments Pt is confused. Attempted to call daughter Whit but number listed ([phone]) did not work. Unable to leave VM on other contact listing (Nik Gallegos). Spoke with Zeenat RN director [phone] she confirms level of care. She reports pt can return back to [facility] at d/c. She reports they have onsite PT/OT and Speech and just requested new outpatient order at d/c. She confirms daughter number correct. Pt currently has wheelchair and walker he uses at facility. Pt has been to [facility] in past per notes. Zeenat request d/c summary along with new outpatient order be faxed at d/c to [phone]. CCP to follow.                  Continued Care and Services - Admitted Since 3/25/2024    No active coordination exists for this encounter.       Selected Continued Care - Prior Encounters Includes continued care and service providers with selected services from prior encounters from 12/26/2023 to 3/26/2024      Discharged on 1/31/2024 Admission date: 1/21/2024 - Discharge disposition: Skilled Nursing Facility (DC - External)      Destination       Service Provider Selected Services Address Phone Fax Patient Preferred    [facility] [address] [id number] [phone] --                      Discharged on 1/8/2024 Admission date: 12/20/2023 - Discharge disposition: Skilled Nursing Facility (DC - External)      Destination       Service Provider Selected Services Address Phone Fax Patient Preferred    [facility] [address]  [place] [address] [phone] --                             Demographic Summary    No documentation.                  Functional Status    No documentation.                  Psychosocial    No documentation.                  Abuse/Neglect    No documentation.                  Legal    No documentation.                  Substance Abuse    No documentation.                  Patient Forms    No documentation.                     NYLA Barrow

## 2024-03-26 NOTE — THERAPY EVALUATION
Acute Care - Speech Language Pathology   Swallow Initial Evaluation [facility]     Patient Name: Anthony Gallegos  : 1945  MRN: [id number]  Today's Date: 3/26/2024               Admit Date: 3/25/2024    Visit Dx:     ICD-10-CM ICD-9-CM   1. Decreased oral intake  R63.8 783.9   2. MARTITA (acute kidney injury)  N17.9 584.9   3. Whole body pain  R52 780.96   4. Dysphagia, unspecified type  R13.10 787.20   5. Dehydration  E86.0 276.51   6. Elevated troponin  R79.89 790.6   7. Failure to thrive in adult  R62.7 783.7   8. Generalized weakness  R53.1 780.79   9. Weight loss  R63.4 783.21   10. Urinary tract infection in male  N39.0 599.0     Patient Active Problem List   Diagnosis    Ankylosing spondylitis of cervical region    Recent unexplained weight loss    Abnormal serum lipase level    Abnormal serum level of amylase    Hypertension    Boil    Immobility    Fall from bed    Tetrahydrocannabinol (THC) use disorder, mild, abuse    Generalized pain        Grief    DISH (disseminated idiopathic skeletal hyperostosis)    Generalized weakness    Severe malnutrition    Altered mental status    Transient alteration of awareness    GERD without esophagitis    BPH (benign prostatic hyperplasia)    UTI (urinary tract infection)    Dehydration    MARTITA (acute kidney injury)    Hyperglycemia    Encephalopathy     Past Medical History:   Diagnosis Date    Abscess of scrotum     MARTITA (acute kidney injury)     Altered mental state     Arthritis     AS (ankylosing spondylitis)     Hypertension     Leukocytosis     Tetrahydrocannabinol (THC) use disorder, mild, abuse 2023    Uveitis      Past Surgical History:   Procedure Laterality Date    COLONOSCOPY      ROTATOR CUFF REPAIR Right     TOTAL HIP ARTHROPLASTY Left        SLP Recommendation and Plan  SLP Swallowing Diagnosis: suspected pharyngeal dysphagia (24 1000)  SLP Diet Recommendation: NPO, ice chips between meals after oral care, with supervision  "(03/26/24 1000)     SLP Rec. for Method of Medication Administration: meds via alternate route (03/26/24 1000)     Monitor for Signs of Aspiration: yes, notify SLP if any concerns (03/26/24 1000)  Recommended Diagnostics: reassess via clinical swallow evaluation (03/26/24 1000)  Swallow Criteria for Skilled Therapeutic Interventions Met: no significant expected improvement in functional status (03/26/24 1000)     Rehab Potential/Prognosis, Swallowing: re-evaluate goals as necessary (03/26/24 1000)  Therapy Frequency (Swallow): PRN (03/26/24 1000)  Predicted Duration Therapy Intervention (Days): until discharge (03/26/24 1000)  Oral Care Recommendations: Oral Care BID/PRN, Before ice/water (03/26/24 1000)                                        Outcome Evaluation: Clinical swallow evaluation completed. Patient with coughing after x3/3 puree trials. Audible swallows with thin liquid by cup/straw. Patient stated he was \"full\" and wanted to discontinue trials following thin and puree trials. Recommend NPO. Meds via alternate route. Consider goals of care discussion. Speech to follow for re-eval.      SWALLOW EVALUATION (Last 72 Hours)       SLP Adult Swallow Evaluation       Row Name 03/26/24 1000                   Rehab Evaluation    Document Type evaluation  -CR        Subjective Information no complaints  -CR        Patient Observations cooperative  -CR        Patient Effort adequate  -CR        Symptoms Noted During/After Treatment none  -CR           General Information    Patient Profile Reviewed yes  -CR        Pertinent History Of Current Problem 80 y/o male with UTI, poor PO intake, weakness, and significant weight loss. Previous admission patient on mechanical ground with thin liquids, no straws.  -CR        Current Method of Nutrition NPO  -CR        Precautions/Limitations, Vision difficult to assess  -CR        Precautions/Limitations, Hearing WFL;for purposes of eval  -CR        Prior Level of " "Function-Swallowing soft to chew;thin liquids  -CR        Plans/Goals Discussed with patient;agreed upon  -CR           Oral Motor Structure and Function    Dentition Assessment edentulous  -CR        Secretion Management dried secretions in oral cavity  -CR        Mucosal Quality dry  -CR           Oral Musculature and Cranial Nerve Assessment    Oral Motor General Assessment generalized oral motor weakness  -CR        Vocal Impairment, Detail. Cranial Nerve X (Vagus) vocal quality abnormality (see comments)  difficult to describe; mostly grunting during eval  -CR           Clinical Swallow Eval    Clinical Swallow Evaluation Summary Clinical swallow evaluation completed. Vocal quality difficult to assess; mostly grunting during eval. Patient with coughing after x3/3 puree trials. Audible swallows with thin liquid by cup/straw. No overt s/s of aspiration with ice chips. Patient stated he was \"full\" and wanted to discontinue trials following thin and puree trials. Recommend NPO. Meds via alternate route. Ice chips OK after oral care. Consider goals of care discussion. Speech to follow for re-eval.  -CR           SLP Evaluation Clinical Impression    SLP Swallowing Diagnosis suspected pharyngeal dysphagia  -CR        Functional Impact risk of aspiration/pneumonia  -CR        Rehab Potential/Prognosis, Swallowing re-evaluate goals as necessary  -CR        Swallow Criteria for Skilled Therapeutic Interventions Met no significant expected improvement in functional status  -CR           Recommendations    Therapy Frequency (Swallow) PRN  -CR        Predicted Duration Therapy Intervention (Days) until discharge  -CR        SLP Diet Recommendation NPO;ice chips between meals after oral care, with supervision  -CR        Recommended Diagnostics reassess via clinical swallow evaluation  -CR        Oral Care Recommendations Oral Care BID/PRN;Before ice/water  -CR        SLP Rec. for Method of Medication Administration meds " via alternate route  -CR        Monitor for Signs of Aspiration yes;notify SLP if any concerns  -CR           Swallow Goals (SLP)    Swallow LTGs Patient will demonstrate functional swallow for  -CR           (LTG) Patient will demonstrate functional swallow for    Diet Texture (Demonstrate functional swallow) pureed textures  -CR        Liquid viscosity (Demonstrate functional swallow) honey/  moderately thick liquids  -CR        [place] (Demonstrate functional swallow) independently (over 90% accuracy)  -CR        Time Frame (Demonstrate functional swallow) by discharge  -CR        Progress/Outcomes (Demonstrate functional swallow) new goal  -CR                  User Key  (r) = Recorded By, (t) = Taken By, (c) = Cosigned By      Initials Name Effective Dates    Linda Laurent SLP 08/28/23 -                     EDUCATION  The patient has been educated in the following areas:   Dysphagia (Swallowing Impairment).        SLP GOALS       Row Name 03/26/24 1000             (LTG) Patient will demonstrate functional swallow for    Diet Texture (Demonstrate functional swallow) pureed textures  -CR      Liquid viscosity (Demonstrate functional swallow) honey/  moderately thick liquids  -CR      [place] (Demonstrate functional swallow) independently (over 90% accuracy)  -CR      Time Frame (Demonstrate functional swallow) by discharge  -CR      Progress/Outcomes (Demonstrate functional swallow) new goal  -CR                User Key  (r) = Recorded By, (t) = Taken By, (c) = Cosigned By      Initials Name Provider Type    Linda Laurent SLP Speech and Language Pathologist                       Time Calculation:    Time Calculation- SLP       Row Name 03/26/24 1037             Time Calculation- SLP    SLP Start Time 0800  -CR      SLP Received On 03/26/24  -CR         Untimed Charges    97230-JW Eval Oral Pharyng Swallow Minutes 45  -CR         Total Minutes    Untimed Charges Total Minutes 45  -CR        Total Minutes 45  -CR                User Key  (r) = Recorded By, (t) = Taken By, (c) = Cosigned By      Initials Name Provider Type    Linda Laurent SLP Speech and Language Pathologist                    Therapy Charges for Today       Code Description Service Date Service Provider Modifiers Qty    83167817205  ST EVAL ORAL PHARYNG SWALLOW 3 3/26/2024 Linda Oliver SLP GN 1                 BRANDON Tang  3/26/2024

## 2024-03-27 LAB
ANION GAP SERPL CALCULATED.3IONS-SCNC: 11 MMOL/L (ref 5–15)
BASOPHILS # BLD AUTO: 0.08 10*3/MM3 (ref 0–0.2)
BASOPHILS NFR BLD AUTO: 0.5 % (ref 0–1.5)
BUN SERPL-MCNC: 80 MG/DL (ref 8–23)
BUN/CREAT SERPL: 36.4 (ref 7–25)
CALCIUM SPEC-SCNC: 8.4 MG/DL (ref 8.6–10.5)
CHLORIDE SERPL-SCNC: 113 MMOL/L (ref 98–107)
CO2 SERPL-SCNC: 25 MMOL/L (ref 22–29)
CREAT SERPL-MCNC: 2.2 MG/DL (ref 0.76–1.27)
DEPRECATED RDW RBC AUTO: 41 FL (ref 37–54)
DEPRECATED RDW RBC AUTO: 41.5 FL (ref 37–54)
EGFRCR SERPLBLD CKD-EPI 2021: 29.5 ML/MIN/1.73
EOSINOPHIL # BLD AUTO: 0.18 10*3/MM3 (ref 0–0.4)
EOSINOPHIL NFR BLD AUTO: 1.2 % (ref 0.3–6.2)
ERYTHROCYTE [DISTWIDTH] IN BLOOD BY AUTOMATED COUNT: 13.2 % (ref 12.3–15.4)
ERYTHROCYTE [DISTWIDTH] IN BLOOD BY AUTOMATED COUNT: 13.2 % (ref 12.3–15.4)
GLUCOSE BLDC GLUCOMTR-MCNC: 107 MG/DL (ref 70–130)
GLUCOSE BLDC GLUCOMTR-MCNC: 120 MG/DL (ref 70–130)
GLUCOSE BLDC GLUCOMTR-MCNC: 123 MG/DL (ref 70–130)
GLUCOSE BLDC GLUCOMTR-MCNC: 85 MG/DL (ref 70–130)
GLUCOSE BLDC GLUCOMTR-MCNC: 94 MG/DL (ref 70–130)
GLUCOSE SERPL-MCNC: 124 MG/DL (ref 65–99)
HCT VFR BLD AUTO: 22.3 % (ref 37.5–51)
HCT VFR BLD AUTO: 36.6 % (ref 37.5–51)
HGB BLD-MCNC: 11.8 G/DL (ref 13–17.7)
HGB BLD-MCNC: 7.1 G/DL (ref 13–17.7)
IMM GRANULOCYTES # BLD AUTO: 0.12 10*3/MM3 (ref 0–0.05)
IMM GRANULOCYTES NFR BLD AUTO: 0.8 % (ref 0–0.5)
LYMPHOCYTES # BLD AUTO: 1.71 10*3/MM3 (ref 0.7–3.1)
LYMPHOCYTES # BLD MANUAL: 0.99 10*3/MM3 (ref 0.7–3.1)
LYMPHOCYTES NFR BLD AUTO: 11.7 % (ref 19.6–45.3)
LYMPHOCYTES NFR BLD MANUAL: 6.1 % (ref 5–12)
MAGNESIUM SERPL-MCNC: 2.9 MG/DL (ref 1.6–2.4)
MCH RBC QN AUTO: 27.8 PG (ref 26.6–33)
MCH RBC QN AUTO: 27.9 PG (ref 26.6–33)
MCHC RBC AUTO-ENTMCNC: 31.8 G/DL (ref 31.5–35.7)
MCHC RBC AUTO-ENTMCNC: 32.2 G/DL (ref 31.5–35.7)
MCV RBC AUTO: 86.5 FL (ref 79–97)
MCV RBC AUTO: 87.5 FL (ref 79–97)
MONOCYTES # BLD AUTO: 1.44 10*3/MM3 (ref 0.1–0.9)
MONOCYTES # BLD: 0.66 10*3/MM3 (ref 0.1–0.9)
MONOCYTES NFR BLD AUTO: 9.8 % (ref 5–12)
NEUTROPHILS # BLD AUTO: 9.24 10*3/MM3 (ref 1.7–7)
NEUTROPHILS NFR BLD AUTO: 11.12 10*3/MM3 (ref 1.7–7)
NEUTROPHILS NFR BLD AUTO: 76 % (ref 42.7–76)
NEUTROPHILS NFR BLD MANUAL: 84.8 % (ref 42.7–76)
NRBC BLD AUTO-RTO: 0 /100 WBC (ref 0–0.2)
PHOSPHATE SERPL-MCNC: 5.2 MG/DL (ref 2.5–4.5)
PLAT MORPH BLD: NORMAL
PLATELET # BLD AUTO: 346 10*3/MM3 (ref 140–450)
PLATELET # BLD AUTO: 389 10*3/MM3 (ref 140–450)
PMV BLD AUTO: 8.9 FL (ref 6–12)
PMV BLD AUTO: 9.4 FL (ref 6–12)
POTASSIUM SERPL-SCNC: 3.8 MMOL/L (ref 3.5–5.2)
PTH-INTACT SERPL-MCNC: 56 PG/ML (ref 15–65)
RBC # BLD AUTO: 2.55 10*6/MM3 (ref 4.14–5.8)
RBC # BLD AUTO: 4.23 10*6/MM3 (ref 4.14–5.8)
RBC MORPH BLD: NORMAL
SODIUM SERPL-SCNC: 149 MMOL/L (ref 136–145)
VARIANT LYMPHS NFR BLD MANUAL: 9.1 % (ref 19.6–45.3)
WBC MORPH BLD: NORMAL
WBC NRBC COR # BLD AUTO: 10.9 10*3/MM3 (ref 3.4–10.8)
WBC NRBC COR # BLD AUTO: 14.65 10*3/MM3 (ref 3.4–10.8)

## 2024-03-27 PROCEDURE — 85025 COMPLETE CBC W/AUTO DIFF WBC: CPT | Performed by: STUDENT IN AN ORGANIZED HEALTH CARE EDUCATION/TRAINING PROGRAM

## 2024-03-27 PROCEDURE — 85007 BL SMEAR W/DIFF WBC COUNT: CPT | Performed by: STUDENT IN AN ORGANIZED HEALTH CARE EDUCATION/TRAINING PROGRAM

## 2024-03-27 PROCEDURE — 99497 ADVNCD CARE PLAN 30 MIN: CPT | Performed by: NURSE PRACTITIONER

## 2024-03-27 PROCEDURE — 97530 THERAPEUTIC ACTIVITIES: CPT

## 2024-03-27 PROCEDURE — 83735 ASSAY OF MAGNESIUM: CPT | Performed by: STUDENT IN AN ORGANIZED HEALTH CARE EDUCATION/TRAINING PROGRAM

## 2024-03-27 PROCEDURE — 97162 PT EVAL MOD COMPLEX 30 MIN: CPT

## 2024-03-27 PROCEDURE — 25010000002 PIPERACILLIN SOD-TAZOBACTAM PER 1 G: Performed by: INTERNAL MEDICINE

## 2024-03-27 PROCEDURE — 82948 REAGENT STRIP/BLOOD GLUCOSE: CPT

## 2024-03-27 PROCEDURE — 84100 ASSAY OF PHOSPHORUS: CPT | Performed by: STUDENT IN AN ORGANIZED HEALTH CARE EDUCATION/TRAINING PROGRAM

## 2024-03-27 PROCEDURE — 83970 ASSAY OF PARATHORMONE: CPT | Performed by: NURSE PRACTITIONER

## 2024-03-27 PROCEDURE — 99221 1ST HOSP IP/OBS SF/LOW 40: CPT | Performed by: NURSE PRACTITIONER

## 2024-03-27 PROCEDURE — 87102 FUNGUS ISOLATION CULTURE: CPT | Performed by: STUDENT IN AN ORGANIZED HEALTH CARE EDUCATION/TRAINING PROGRAM

## 2024-03-27 PROCEDURE — 80048 BASIC METABOLIC PNL TOTAL CA: CPT | Performed by: STUDENT IN AN ORGANIZED HEALTH CARE EDUCATION/TRAINING PROGRAM

## 2024-03-27 PROCEDURE — 25010000002 HEPARIN (PORCINE) PER 1000 UNITS: Performed by: STUDENT IN AN ORGANIZED HEALTH CARE EDUCATION/TRAINING PROGRAM

## 2024-03-27 PROCEDURE — 92526 ORAL FUNCTION THERAPY: CPT

## 2024-03-27 RX ORDER — DEXTROSE AND SODIUM CHLORIDE 5; .45 G/100ML; G/100ML
75 INJECTION, SOLUTION INTRAVENOUS CONTINUOUS
Status: DISCONTINUED | OUTPATIENT
Start: 2024-03-28 | End: 2024-03-28

## 2024-03-27 RX ADMIN — PIPERACILLIN AND TAZOBACTAM 3.38 G: 3; .375 INJECTION, POWDER, FOR SOLUTION INTRAVENOUS at 11:45

## 2024-03-27 RX ADMIN — HEPARIN SODIUM 5000 UNITS: 5000 INJECTION INTRAVENOUS; SUBCUTANEOUS at 22:53

## 2024-03-27 RX ADMIN — HEPARIN SODIUM 5000 UNITS: 5000 INJECTION INTRAVENOUS; SUBCUTANEOUS at 16:49

## 2024-03-27 RX ADMIN — PIPERACILLIN AND TAZOBACTAM 3.38 G: 3; .375 INJECTION, POWDER, FOR SOLUTION INTRAVENOUS at 18:34

## 2024-03-27 RX ADMIN — ANORECTAL OINTMENT 1 APPLICATION: 15.7; .44; 24; 20.6 OINTMENT TOPICAL at 20:11

## 2024-03-27 RX ADMIN — ANORECTAL OINTMENT 1 APPLICATION: 15.7; .44; 24; 20.6 OINTMENT TOPICAL at 08:59

## 2024-03-27 RX ADMIN — HEPARIN SODIUM 5000 UNITS: 5000 INJECTION INTRAVENOUS; SUBCUTANEOUS at 05:55

## 2024-03-27 RX ADMIN — PIPERACILLIN AND TAZOBACTAM 3.38 G: 3; .375 INJECTION, POWDER, FOR SOLUTION INTRAVENOUS at 03:30

## 2024-03-27 NOTE — THERAPY RE-EVALUATION
Acute Care - Speech Language Pathology   Swallow Re-Evaluation [facility]     Patient Name: Anthony Gallegos  : 1945  MRN: [id number]  Today's Date: 3/27/2024               Admit Date: 3/25/2024    Visit Dx:     ICD-10-CM ICD-9-CM   1. Decreased oral intake  R63.8 783.9   2. MARTITA (acute kidney injury)  N17.9 584.9   3. Whole body pain  R52 780.96   4. Dysphagia, unspecified type  R13.10 787.20   5. Dehydration  E86.0 276.51   6. Elevated troponin  R79.89 790.6   7. Failure to thrive in adult  R62.7 783.7   8. Generalized weakness  R53.1 780.79   9. Weight loss  R63.4 783.21   10. Urinary tract infection in male  N39.0 599.0     Patient Active Problem List   Diagnosis    Ankylosing spondylitis of cervical region    Recent unexplained weight loss    Abnormal serum lipase level    Abnormal serum level of amylase    Hypertension    Boil    Immobility    Fall from bed    Tetrahydrocannabinol (THC) use disorder, mild, abuse    Generalized pain        Grief    DISH (disseminated idiopathic skeletal hyperostosis)    Generalized weakness    Severe malnutrition    Altered mental status    Transient alteration of awareness    GERD without esophagitis    BPH (benign prostatic hyperplasia)    UTI (urinary tract infection)    Dehydration    MARTITA (acute kidney injury)    Hyperglycemia    Encephalopathy     Past Medical History:   Diagnosis Date    Abscess of scrotum     MARTITA (acute kidney injury)     Altered mental state     Arthritis     AS (ankylosing spondylitis)     Hypertension     Leukocytosis     Tetrahydrocannabinol (THC) use disorder, mild, abuse 2023    Uveitis      Past Surgical History:   Procedure Laterality Date    COLONOSCOPY      ROTATOR CUFF REPAIR Right     TOTAL HIP ARTHROPLASTY Left        SLP Recommendation and Plan  SLP Swallowing Diagnosis: suspected pharyngeal dysphagia (24 1601)  SLP Diet Recommendation: NPO, ice chips between meals after oral care, with supervision (24  1601)     SLP Rec. for Method of Medication Administration: meds via alternate route (03/27/24 1601)     Monitor for Signs of Aspiration: yes, notify SLP if any concerns (03/27/24 1601)  Recommended Diagnostics: reassess via [place] (03/27/24 1601)  Swallow Criteria for Skilled Therapeutic Interventions Met: no significant expected improvement in functional status (03/27/24 1601)     Rehab Potential/Prognosis, Swallowing: re-evaluate goals as necessary (03/27/24 1601)  Therapy Frequency (Swallow): PRN (03/27/24 1601)  Predicted Duration Therapy Intervention (Days): until discharge (03/27/24 1601)  Oral Care Recommendations: Oral Care BID/PRN, Before ice/water (03/27/24 1601)                                        Plan of Care Reviewed With: patient, daughter  Outcome Evaluation: Re-eval of swallow completed. Recommend NPI, ice chips sparingly. VFSS to further assess safety with PO. SLP discussed with patient and daughter that despite any PO recs the VFSS may result with, concern for need for additional source for nutirtion pending goal of care. Patient report no desire for PO intake.  Patient demonstrated no overt s/s aspiration with ice chips. Patient demonstrated audible swallow with all liquids. 5-6 swallows per bolus thins. 3 swallows per bolus nectar thick and honey thick liquids. Delayed wet vocal quality after intial trial puree, suspect residue of liquid impacting vocal quality. Multiple swallows per bolus puree. Consistent belching s/p all PO, intermittent change in vocal quality.      SWALLOW EVALUATION (Last 72 Hours)       SLP Adult Swallow Evaluation       Row Name 03/27/24 1601 03/26/24 1000                Rehab Evaluation    Document Type evaluation  -OC evaluation  -CR       Subjective Information no complaints  -OC no complaints  -CR       Patient Observations alert;cooperative;agree to therapy  -OC cooperative  -CR       Patient Effort good  -OC adequate  -CR       Symptoms Noted During/After  Treatment none  -OC none  -CR          General Information    Patient Profile Reviewed yes  -OC yes  -CR       Pertinent History Of Current Problem -- 80 y/o male with UTI, poor PO intake, weakness, and significant weight loss. Previous admission patient on mechanical ground with thin liquids, no straws.  -CR       Current Method of Nutrition NPO  -OC NPO  -CR       Precautions/Limitations, Vision difficult to assess  -OC difficult to assess  -CR       Precautions/Limitations, Hearing WFL;for purposes of eval  -OC WFL;for purposes of eval  -CR       Prior Level of Function-Communication unknown  -OC --       Prior Level of Function-Swallowing soft to chew;thin liquids;other (see comments)  minimal/no intake reported  -OC soft to chew;thin liquids  -CR       Plans/Goals Discussed with patient;agreed upon  -OC patient;agreed upon  -CR       Barriers to Rehab medically complex  -OC --       Patient's Goals for Discharge patient did not state  -OC --          Pain    Additional Documentation Pain Scale: Numbers Pre/Post-Treatment (Group)  -OC --          Pain Scale: Numbers Pre/Post-Treatment    Pretreatment Pain Rating 0/10 - no pain  -OC --       Posttreatment Pain Rating 0/10 - no pain  -OC --          Oral Motor Structure and Function    Dentition Assessment edentulous  -OC edentulous  -CR       Secretion Management WNL/WFL  -OC dried secretions in oral cavity  -CR       Mucosal Quality dry  -OC dry  -CR          Oral Musculature and Cranial Nerve Assessment    Oral Motor General Assessment generalized oral motor weakness  -OC generalized oral motor weakness  -CR       Vocal Impairment, Detail. Cranial Nerve X (Vagus) -- vocal quality abnormality (see comments)  difficult to describe; mostly grunting during eval  -CR          Clinical Swallow Eval    Clinical Swallow Evaluation Summary Re-eval of swallow completed. Patient demonstrated no overt s/s aspiration with ice chips. Patient demonstrated audible swallow with  "all liquids. 5-6 swallows per bolus thins. 3 swallows per bolus nectar thick and honey thick liquids. Delayed wet vocal quality after intial trial puree, suspect residue of liquid impacting vocal quality. Multiple swallows per bolus puree. Consistent belching s/p all PO, intermittent change in vocal quality.  -OC Clinical swallow evaluation completed. Vocal quality difficult to assess; mostly grunting during eval. Patient with coughing after x3/3 puree trials. Audible swallows with thin liquid by cup/straw. No overt s/s of aspiration with ice chips. Patient stated he was \"full\" and wanted to discontinue trials following thin and puree trials. Recommend NPO. Meds via alternate route. Ice chips OK after oral care. Consider goals of care discussion. Speech to follow for re-eval.  -CR          SLP Evaluation Clinical Impression    SLP Swallowing Diagnosis suspected pharyngeal dysphagia  -OC suspected pharyngeal dysphagia  -CR       Functional Impact risk of aspiration/pneumonia  -OC risk of aspiration/pneumonia  -CR       Rehab Potential/Prognosis, Swallowing re-evaluate goals as necessary  -OC re-evaluate goals as necessary  -CR       Swallow Criteria for Skilled Therapeutic Interventions Met no significant expected improvement in functional status  -OC no significant expected improvement in functional status  -CR          Recommendations    Therapy Frequency (Swallow) PRN  -OC PRN  -CR       Predicted Duration Therapy Intervention (Days) until discharge  -OC until discharge  -CR       SLP Diet Recommendation NPO;ice chips between meals after oral care, with supervision  -OC NPO;ice chips between meals after oral care, with supervision  -CR       Recommended Diagnostics reassess via VFSS ([facility])  -OC reassess via clinical swallow evaluation  -CR       Oral Care Recommendations Oral Care BID/PRN;Before ice/water  -OC Oral Care BID/PRN;Before ice/water  -CR       SLP Rec. for Method of Medication Administration meds via " alternate route  -OC meds via alternate route  -CR       Monitor for Signs of Aspiration yes;notify SLP if any concerns  -OC yes;notify SLP if any concerns  -CR          Swallow Goals (SLP)    Swallow LTGs -- Patient will demonstrate functional swallow for  -CR          (LTG) Patient will demonstrate functional swallow for    Diet Texture (Demonstrate functional swallow) -- pureed textures  -CR       Liquid viscosity (Demonstrate functional swallow) -- honey/  moderately thick liquids  -CR       [place] (Demonstrate functional swallow) -- independently (over 90% accuracy)  -CR       Time Frame (Demonstrate functional swallow) -- by discharge  -CR       Progress/Outcomes (Demonstrate functional swallow) -- new goal  -CR                 User Key  (r) = Recorded By, (t) = Taken By, (c) = Cosigned By      Initials Name Effective Dates    Katalina Moeller SLP 08/28/23 -     Linda Laurent SLP 08/28/23 -                     EDUCATION  The patient has been educated in the following areas:   Dysphagia (Swallowing Impairment).        SLP GOALS       Row Name 03/26/24 1000             (LTG) Patient will demonstrate functional swallow for    Diet Texture (Demonstrate functional swallow) pureed textures  -CR      Liquid viscosity (Demonstrate functional swallow) honey/  moderately thick liquids  -CR      [place] (Demonstrate functional swallow) independently (over 90% accuracy)  -CR      Time Frame (Demonstrate functional swallow) by discharge  -CR      Progress/Outcomes (Demonstrate functional swallow) new goal  -CR                User Key  (r) = Recorded By, (t) = Taken By, (c) = Cosigned By      Initials Name Provider Type    Linda Laurent SLP Speech and Language Pathologist                       Time Calculation:    Time Calculation- SLP       Row Name 03/27/24 1608             Time Calculation- SLP    SLP Start Time 1515  -OC      SLP Received On 03/27/24  -OC         Untimed Charges    SLP  Treatment ST Treatment Swallow Minutes  - 49010  -OC      13072-OV Treatment Swallow Minutes 75  -OC         Total Minutes    Untimed Charges Total Minutes 75  -OC       Total Minutes 75  -OC                User Key  (r) = Recorded By, (t) = Taken By, (c) = Cosigned By      Initials Name Provider Type    Katalina Moeller SLP Speech and Language Pathologist                    Therapy Charges for Today       Code Description Service Date Service Provider Modifiers Qty    [id number]  ST TREATMENT SWALLOW 5 3/27/2024 Katalina Quintana SLP GN 1                 BRANDON Crook  3/27/2024

## 2024-03-27 NOTE — PLAN OF CARE
Goal Outcome Evaluation:  Plan of Care Reviewed With: patient, daughter           Outcome Evaluation: Re-eval of swallow completed. Recommend NPO, ice chips sparingly. VFSS to further assess safety with PO. SLP discussed with patient and daughter that despite any PO recs the VFSS may result with, concern for need for additional source for nutirtion pending goal of care. Patient reports no desire for PO intake.     Patient demonstrated no overt s/s aspiration with ice chips. Patient demonstrated audible swallow with all liquids. 5-6 swallows per bolus thins. 3 swallows per bolus nectar thick and honey thick liquids. Delayed wet vocal quality after intial trial puree, suspect residue of liquid impacting vocal quality. Multiple swallows per bolus puree. Consistent belching s/p all PO, intermittent change in vocal quality. Daughter report belching at facility and spitting up purees on Sunday. Secondary to daugther's report and frequent belching on assessment, SLP with concern for esophageal component as well as oropharyngeal.                  SLP Swallowing Diagnosis: suspected pharyngeal dysphagia (03/27/24 7168)

## 2024-03-27 NOTE — PROGRESS NOTES
"  [facility] Progress Note    Chief Complaint: None    Some issues last night with his catheter not draining well but appears to be draining much better.  He had good urine output.  Urine is dark but no cally blood or clots seen.  He seems to be a little more comfortable.  Some of his family members are present in the room.    Review of Systems:    The following systems were reviewed and negative;  respiratory, cardiovascular, and gastrointestinal          Vital Signs  /60 (BP Location: Right arm, Patient Position: Lying)   Pulse 70   Temp 97.5 °F (36.4 °C) (Oral)   Resp 18   Ht 190.5 cm (75\")   Wt 76.4 kg (168 lb 6.4 oz)   SpO2 90%   BMI 21.05 kg/m²     Physical Exam:     General Appearance:    Alert, cooperative, NAD   HEENT:    No trauma, pupils reactive, hearing intact   Back:     No CVA tenderness   Lungs:     Respirations unlabored, no wheezing    Heart:    RRR, intact peripheral pulses   Abdomen:   Mildly tender throughout the bladder but no distention   :  Mariscal catheter anchored   Extremities:   No edema, no deformity   Lymphatic:   No neck or groin LAD   Skin:   No bleeding, bruising or rashes   Neuro/Psych:   Orientation intact, mood/affect pleasant, no focal findings        Results Review:     I reviewed the patient's new clinical results.  Lab Results (last 24 hours)       Procedure Component Value Units Date/Time    POC Glucose Once [[id number]]  (Normal) Collected: 03/27/24 1118    Specimen: Blood Updated: 03/27/24 1119     Glucose 107 mg/dL     CBC & Differential [[id number]]  (Abnormal) Collected: 03/27/24 1015    Specimen: Blood Updated: 03/27/24 1043    Narrative:      The following orders were created for panel order CBC & Differential.  Procedure                               Abnormality         Status                     ---------                               -----------         ------                     CBC Auto Differential[[id number]]        Abnormal            Final result  "                Please view results for these tests on the individual orders.    CBC Auto Differential [[id number]]  (Abnormal) Collected: 03/27/24 1015    Specimen: Blood Updated: 03/27/24 1043     WBC 14.65 10*3/mm3      RBC 4.23 10*6/mm3      Hemoglobin 11.8 g/dL      Hematocrit 36.6 %      MCV 86.5 fL      MCH 27.9 pg      MCHC 32.2 g/dL      RDW 13.2 %      RDW-SD 41.0 fl      MPV 9.4 fL      Platelets 389 10*3/mm3      Neutrophil % 76.0 %      Lymphocyte % 11.7 %      Monocyte % 9.8 %      Eosinophil % 1.2 %      Basophil % 0.5 %      Immature Grans % 0.8 %      Neutrophils, Absolute 11.12 10*3/mm3      Lymphocytes, Absolute 1.71 10*3/mm3      Monocytes, Absolute 1.44 10*3/mm3      Eosinophils, Absolute 0.18 10*3/mm3      Basophils, Absolute 0.08 10*3/mm3      Immature Grans, Absolute 0.12 10*3/mm3      nRBC 0.0 /100 WBC     Urine Culture - Urine, Straight Cath [[id number]]  (Abnormal) Collected: 03/25/24 1918    Specimen: Urine from Straight Cath Updated: 03/27/24 1001     Urine Culture >100,000 CFU/mL Gram Negative Bacilli    Narrative:      Colonization of the urinary tract without infection is common. Treatment is discouraged unless the patient is symptomatic, pregnant, or undergoing an invasive urologic procedure.    CANDIDA AURIS SCREEN - Swab, Axilla Right, Axilla Left and Groin [[id number]] Collected: 03/27/24 0859    Specimen: Swab from Axilla Right, Axilla Left and Groin Updated: 03/27/24 0916    POC Glucose Once [[id number]]  (Normal) Collected: 03/27/24 0742    Specimen: Blood Updated: 03/27/24 0744     Glucose 123 mg/dL     Basic Metabolic Panel [[id number]]  (Abnormal) Collected: 03/27/24 0649    Specimen: Blood from Arm, Right Updated: 03/27/24 0725     Glucose 124 mg/dL      BUN 80 mg/dL      Creatinine 2.20 mg/dL      Sodium 149 mmol/L      Potassium 3.8 mmol/L      Chloride 113 mmol/L      CO2 25.0 mmol/L      Calcium 8.4 mg/dL      BUN/Creatinine Ratio 36.4     Anion Gap 11.0 mmol/L      eGFR 29.5  mL/min/1.73     Narrative:      GFR Normal >60  Chronic Kidney Disease <60  Kidney Failure <15    The GFR formula is only valid for adults with stable renal function between ages 18 and 70.    Magnesium [[id number]]  (Abnormal) Collected: 03/27/24 0649    Specimen: Blood from Arm, Right Updated: 03/27/24 0725     Magnesium 2.9 mg/dL     Phosphorus [[id number]]  (Abnormal) Collected: 03/27/24 0649    Specimen: Blood from Arm, Right Updated: 03/27/24 0720     Phosphorus 5.2 mg/dL     Manual Differential [[id number]]  (Abnormal) Collected: 03/27/24 0431    Specimen: Blood from Arm, Right Updated: 03/27/24 0707     Neutrophil % 84.8 %      Lymphocyte % 9.1 %      Monocyte % 6.1 %      Neutrophils Absolute 9.24 10*3/mm3      Lymphocytes Absolute 0.99 10*3/mm3      Monocytes Absolute 0.66 10*3/mm3      RBC Morphology Normal     WBC Morphology Normal     Platelet Morphology Normal    CBC & Differential [[id number]]  (Abnormal) Collected: 03/27/24 0431    Specimen: Blood from Arm, Right Updated: 03/27/24 0703    Narrative:      The following orders were created for panel order CBC & Differential.  Procedure                               Abnormality         Status                     ---------                               -----------         ------                     CBC Auto Differential[[id number]]        Abnormal            Final result                 Please view results for these tests on the individual orders.    CBC Auto Differential [[id number]]  (Abnormal) Collected: 03/27/24 0431    Specimen: Blood from Arm, Right Updated: 03/27/24 0703     WBC 10.90 10*3/mm3      RBC 2.55 10*6/mm3      Hemoglobin 7.1 g/dL      Hematocrit 22.3 %      MCV 87.5 fL      MCH 27.8 pg      MCHC 31.8 g/dL      RDW 13.2 %      RDW-SD 41.5 fl      MPV 8.9 fL      Platelets 346 10*3/mm3     POC Glucose Once [[id number]]  (Normal) Collected: 03/27/24 0648    Specimen: Blood Updated: 03/27/24 0654     Glucose 120 mg/dL     Blood Culture - Blood,  Arm, Left [[id number]]  (Normal) Collected: 03/25/24 2010    Specimen: Blood from Arm, Left Updated: 03/26/24 2030     Blood Culture No growth at 24 hours    Narrative:      Less than seven (7) mL's of blood was collected.  Insufficient quantity may yield false negative results.    Blood Culture - Blood, Arm, Right [[id number]]  (Normal) Collected: 03/25/24 1956    Specimen: Blood from Arm, Right Updated: 03/26/24 2015     Blood Culture No growth at 24 hours    POC Glucose Once [[id number]]  (Normal) Collected: 03/26/24 1937    Specimen: Blood Updated: 03/26/24 1938     Glucose 89 mg/dL           Imaging Results (Last 24 Hours)       Procedure Component Value Units Date/Time    CT Head Without Contrast [[id number]] Collected: 03/25/24 1835     Updated: 03/27/24 1241    Narrative:      CT OF THE BRAIN WITHOUT CONTRAST 03/25/2024     HISTORY: Mental status change.     Axial images were obtained through the brain without intravenous  contrast.     There is moderate diffuse atrophy. There is decreased attenuation of the  periventricular white matter bilaterally consistent with small vessel  white matter ischemic disease. There is no evidence of acute infarction,  hemorrhage, midline shift or mass effect.     No bony abnormalities are seen.       Impression:      No acute process identified.     Radiation dose reduction techniques were utilized, including automated  exposure control and exposure modulation based on body size.        This report was finalized on 3/27/2024 12:38 PM by Dr. Dario Dumont M.D on Workstation: [id number]               Medication Review:   I have personally reviewed    Current Facility-Administered Medications:     acetaminophen (TYLENOL) tablet 650 mg, 650 mg, Oral, Q4H PRN, StingCasie sandoval MD    sennosides-docusate (PERICOLACE) 8.6-50 MG per tablet 2 tablet, 2 tablet, Oral, BID **AND** polyethylene glycol (MIRALAX) packet 17 g, 17 g, Oral, BID **AND** bisacodyl (DULCOLAX) EC tablet 5  mg, 5 mg, Oral, Daily PRN **AND** bisacodyl (DULCOLAX) suppository 10 mg, 10 mg, Rectal, Daily PRN, Nomi Osman MD    bisacodyl (DULCOLAX) suppository 10 mg, 10 mg, Rectal, Once, Nomi Osmna MD    dextrose (D50W) (25 g/50 mL) IV injection 25 g, 25 g, Intravenous, Q15 Min PRN, Nomi Osman MD    dextrose (GLUTOSE) oral gel 15 g, 15 g, Oral, Q15 Min PRN, Nomi Osman MD    dextrose 5 % and sodium chloride 0.9 % infusion, 100 mL/hr, Intravenous, Continuous, Nomi Osman MD, Last Rate: 100 mL/hr at 03/26/24 2013, 100 mL/hr at 03/26/24 2013    finasteride (PROSCAR) tablet 5 mg, 5 mg, Oral, Daily, Casie Reardon MD    glucagon (GLUCAGEN) injection 1 mg, 1 mg, Subcutaneous, Q15 Min PRN, Nomi Osman MD    heparin (porcine) 5000 UNIT/ML injection 5,000 Units, 5,000 Units, Subcutaneous, Q8H, Nomi Osman MD, 5,000 Units at 03/27/24 0555    melatonin tablet 3 mg, 3 mg, Oral, Nightly PRN, Casie Reardon MD    Menthol-Zinc Oxide 1 Application, 1 Application, Topical, Q12H, Nomi Osman MD, 1 Application at 03/27/24 0859    ondansetron ODT (ZOFRAN-ODT) disintegrating tablet 4 mg, 4 mg, Oral, Q6H PRN **OR** ondansetron (ZOFRAN) injection 4 mg, 4 mg, Intravenous, Q6H PRN, Casie Reardon MD    pantoprazole (PROTONIX) EC tablet 40 mg, 40 mg, Oral, Q AM, Casie Reardon MD    piperacillin-tazobactam (ZOSYN) 3.375 g IVPB in 100 mL NS MBP (CD), 3.375 g, Intravenous, Q8H, Casie Reardon MD, 3.375 g at 03/27/24 1145    [COMPLETED] Insert Peripheral IV, , , Once **AND** sodium chloride 0.9 % flush 10 mL, 10 mL, Intravenous, PRN, Ashleigh Early MD, 10 mL at 03/25/24 1319    tamsulosin (FLOMAX) 24 hr capsule 0.4 mg, 0.4 mg, Oral, Nightly, Casie Reardon MD    Allergies:    Patient has no known allergies.    Assessment:    Active Problems:    UTI (urinary tract infection)    Hypertension    Severe malnutrition    MARTITA (acute  kidney injury)    Encephalopathy       Gross hematuria with retention secondary to acute urinary tract infection    Plan:    Urine is clearing up draining well.  His catheter can be removed prior to discharge.  Call if needed.      Bennie Noel MD    3/27/2024  14:57 EDT

## 2024-03-27 NOTE — CONSULTS
.            [facility]    Palliative Care Initial Consult   Attending Physician: Nomi Osman MD  Referring Provider: Dr Osman    Reason for Referral: assistance with clarification of goals of care  Family/Support: children     Code Status and Medical Interventions:   Ordered at: 03/25/24 1943     Code Status (Patient has no pulse and is not breathing):    CPR (Attempt to Resuscitate)     Medical Interventions (Patient has pulse or is breathing):    Full     Goals of Care: TBD.    HPI:   79 y.o. male with history of ankylosing spondylitis of cervical spine, benign prostate hyperplasia, hydronephrosis. He resided at [facility] [place] prior to admission.   Patient presented to [facility] on 3/25/2024 related to weakness and poor oral intake. Workup in ER, revealed sepsis, acute kidney injury and urinary retention. He was started on IV Zoysn and consult placed for urology. He had indwelling catheter placed. His urine culture is pending. He was evaluated for dysphagia by speech therapy yesterday and they recommended NPO. The palliative care team was consulted for support with goals of care conversation.   Palliative Care Spoke With: patient  Quality of life: fair  Functional Status: wheelchair per daughter  Due to the Palliative Care Topics Discussed: palliative care, goals of care, care options, resuscitation status, and discharge options we will establish an advance care plan.   Advance Care Planning   Advance Care Planning Discussion: see below        Review of Systems   Unable to perform ROS: Mental status change       1- Pain Assessment  Pain Location: abdomen    Past Medical History:   Diagnosis Date    Abscess of scrotum     MARTITA (acute kidney injury)     Altered mental state     Arthritis     AS (ankylosing spondylitis)     Hypertension     Leukocytosis     Tetrahydrocannabinol (THC) use disorder, mild, abuse 12/20/2023    Uveitis       Past Surgical History:   Procedure Laterality Date    COLONOSCOPY      ROTATOR CUFF REPAIR Right     TOTAL HIP ARTHROPLASTY Left 2014     Social History     Socioeconomic History    Marital status:     Number of children: 2   Tobacco Use    Smoking status: Never    Smokeless tobacco: Never   Vaping Use    Vaping status: Never Used   Substance and Sexual Activity    Alcohol use: No    Drug use: No    Sexual activity: Defer       Current Facility-Administered Medications   Medication Dose Route Frequency Provider Last Rate Last Admin    acetaminophen (TYLENOL) tablet 650 mg  650 mg Oral Q4H PRN Casie Reardon MD        sennosides-docusate (PERICOLACE) 8.6-50 MG per tablet 2 tablet  2 tablet Oral BID Nomi Osman MD        And    polyethylene glycol (MIRALAX) packet 17 g  17 g Oral BID Nomi Osman MD        And    bisacodyl (DULCOLAX) EC tablet 5 mg  5 mg Oral Daily PRN Nomi Osman MD        And    bisacodyl (DULCOLAX) suppository 10 mg  10 mg Rectal Daily PRN Nomi Osman MD        bisacodyl (DULCOLAX) suppository 10 mg  10 mg Rectal Once Nomi Osman MD        dextrose (D50W) (25 g/50 mL) IV injection 25 g  25 g Intravenous Q15 Min PRN Nomi Osman MD        dextrose (GLUTOSE) oral gel 15 g  15 g Oral Q15 Min PRN Nomi Osman MD        dextrose 5 % and sodium chloride 0.9 % infusion  100 mL/hr Intravenous Continuous Nomi Osman  mL/hr at 03/26/24 2013 100 mL/hr at 03/26/24 2013    finasteride (PROSCAR) tablet 5 mg  5 mg Oral Daily Casie Reardon MD        glucagon (GLUCAGEN) injection 1 mg  1 mg Subcutaneous Q15 Min PRN Nomi Osman MD        heparin (porcine) 5000 UNIT/ML injection 5,000 Units  5,000 Units Subcutaneous Q8H Nomi Osman MD   5,000 Units at 03/27/24 0555    melatonin tablet 3 mg  3 mg Oral Nightly PRN Casie Reardon MD        Menthol-Zinc Oxide 1 Application  1 Application  "Topical Q12H Nomi Osman MD   1 Application at 03/27/24 0859    ondansetron ODT (ZOFRAN-ODT) disintegrating tablet 4 mg  4 mg Oral Q6H PRN Casie Reardon MD        Or    ondansetron (ZOFRAN) injection 4 mg  4 mg Intravenous Q6H PRN Casie Reardon MD        pantoprazole (PROTONIX) EC tablet 40 mg  40 mg Oral Q AM Casie Reardon MD        piperacillin-tazobactam (ZOSYN) 3.375 g IVPB in 100 mL NS MBP (CD)  3.375 g Intravenous Q8H StingCasie sandoval MD   3.375 g at 03/27/24 0330    sodium chloride 0.9 % flush 10 mL  10 mL Intravenous PRN Ashleigh Early MD   10 mL at 03/25/24 1719    tamsulosin (FLOMAX) 24 hr capsule 0.4 mg  0.4 mg Oral Nightly StingCasie sandoval MD         dextrose 5 % and sodium chloride 0.9 %, 100 mL/hr, Last Rate: 100 mL/hr (03/26/24 2013)        acetaminophen    senna-docusate sodium **AND** polyethylene glycol **AND** bisacodyl **AND** bisacodyl    dextrose    dextrose    glucagon (human recombinant)    melatonin    ondansetron ODT **OR** ondansetron    [COMPLETED] Insert Peripheral IV **AND** sodium chloride    No Known Allergies  Attest that current medications reviewed  including but not limited to prescriptions, over-the counter, herbals and vitamin/mineral/dietary (nutritional) supplements for name, route of administration, type, dose and frequency and are current using all immediate resources available at time of dictation.      Intake/Output Summary (Last 24 hours) at 3/27/2024 0971  Last data filed at 3/27/2024 0559  Gross per 24 hour   Intake 10 ml   Output 3400 ml   Net -3390 ml       Physical Exam:    Diagnostics: Reviewed  /51 (BP Location: Right arm, Patient Position: Lying)   Pulse 77   Temp 97.5 °F (36.4 °C) (Oral)   Resp 18   Ht 190.5 cm (75\")   Wt 76.4 kg (168 lb 6.4 oz)   SpO2 100%   BMI 21.05 kg/m²     Constitutional:       Appearance: Not in distress. Cachectic and frail. Chronically ill-appearing.   Pulmonary:      " Effort: Pulmonary effort is normal.   Cardiovascular:      PMI at left midclavicular line.   Edema:     Peripheral edema absent.   Abdominal:      General: Bowel sounds are normal.      Palpations: Abdomen is soft.      Tenderness: There is no abdominal tenderness.   Genitourinary:     Comments: F/c with yellow urine noted   Neurological:      Mental Status: Alert. Disoriented.      Comments: Oriented to self and location '  Unsure of situation and month/year   Psychiatric:         Mood and Affect: Affect is flat.         Speech: Speech normal.         Behavior: Behavior is cooperative.         Thought Content: Thought content normal.         Cognition and Memory: Cognition normal.         Judgment: Judgment normal.       Patient status: Disease state: Controlled with current treatments.  Functional status: Palliative Performance Scale Score: Performance 40% based on the following measures: Ambulation: Mainly in bed, Activity and Evidence of Disease: Unable to do any work, extensive evidence of disease, Self-Care: Mainly assistance required,  Intake: Normal or reduced, LOC: Full, drowsy or confusion   Nutritional status: Albumin 3.0 on 3/25/2024  Body mass index is 21.05 kg/m².   Family support: The patient receives support from his children..  Advance Directives: Advance Directive Status: Patient does not have advance directive   POA/Healthcare surrogate-n/a.       Impression/Problem List:    UTI   Bilateral hydronephrosis  Urinary retention  Failure to thrive  Dysphagia  Hypertension      Deconditioned        Recommendations/Plan:  1. Provide support with goals of care  2. FULL code       2.  Palliative care encounter  The patient is unable to participate in goals of care conversation due to confusion and unaware of situation. I attempted to call his daughter and unable to leave a voicemail.I also called second listed contact, Nik and no answer and was I able to leave a generic voicemail.  I was able to  eventually get in touch with patient daughter, Whit at 1138. She has a fair understanding of the patient conditions, which we reviewed in detail. She reports since December 2023 the patient has progressively declined. He has had multiple hospital admissions, leading to rehab stays and he did most recently return back to [facility] assisted living [facility] where she pays for personal care to assist with bathing, clothing etc. He has been mostly wheelchair bound. His cognition has varied as well since mid January. She reports he hasn't been diagnosed with dementia or cognitive disorder. She reports his appetite has worsened over the last couple months and he favors more sweets than anything else. The patient doesn't have a living will/advance directive. He does have two children , Whit and Nik, however Whit handles his affairs.I did provide her with information regarding palliative care. We also discussed CODE status/medical interventions and she reports her father previously told her he wanted CPR and to be kept alive. We briefly discussed risk and benefit of such intervention with the patient current state of health.  We discussed concerns for dysphagia, ST evaluation (NPO) and recommendations. Also discussed coretrak, PEG, and comfort feeding. The patient is  slightly more awake per bedside RN so will ask ST to reevaluate patient for diet and daughter made aware. She informed me the patient declined coretrak as well.  She was very overwhelmed with conversation and she plans to visit her father today. I will plan to follow up tomorrow with her at 1300 to discuss goals further. I spoke with TERESA Crowder.       Thank you for this consult and allowing us to participate in patient's plan of care. Palliative Care Team will continue to follow patient.     Time spent:45 minutes spent reviewing medical and medication records, assessing and examining patient, discussing with family, answering questions, providing  some guidance about a plan and documentation of care, and coordinating care with other healthcare members, with > 50% time spent face to face.   30 minutes spent on advance care planning.    Emma Corcoran, NOHEMI  3/27/2024  09:28 EDT

## 2024-03-27 NOTE — NURSING NOTE
Adult Protective Services report made due to patient's drastic weight loss in that last couple months, malnutrition, decreased mobility, and new pressure injury. Reference #[id number]

## 2024-03-27 NOTE — PAYOR COMM NOTE
"Anthony Gallegos (79 y.o. Male)      SEE FOR INPATIENT: REF# [id number]     DEPT: [phone],  [phone]    [facility]: NPI [id number] [facility]# [id number]    NADIR GONZALEZ RN,[facility]     Date of Birth   1945    Social Security Number       Address   [address] [place] [id number]    Home Phone   [phone]    MRN   [id number]       [facility]    Marital Status                               Admission Date   3/25/24    Admission Type   Emergency    Admitting Provider   Casie Reardon MD    Attending Provider   Nomi Osman MD    Department, Room/Bed   [facility] OBSERVATION, 123/1       Discharge Date       Discharge Disposition       Discharge Destination                                 Attending Provider: Nomi Osman MD    Allergies: No Known Allergies    Isolation: Contact   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), Candida Auris (rule out) (03/26/24)   Code Status: CPR    Ht: 190.5 cm (75\")   Wt: 76.4 kg (168 lb 6.4 oz)    Admission Cmt: None   Principal Problem: UTI (urinary tract infection) [N39.0]                   Active Insurance as of 3/25/2024       Primary Coverage       Payor Plan Insurance Group Employer/Plan Group    AETNA MEDICARE REPLACEMENT AETNA MED ADV POS [id number]       Payor Plan Address Payor Plan Phone Number Payor Plan Fax Number Effective Dates    [address] [phone]  12/1/2023 - None Entered    [facility] [id number]         Subscriber Name Subscriber Birth Date Member ID       ANTHONY GALLEGOS 1945 [id number]                     Emergency Contacts        (Rel.) Home Phone Work Phone Mobile Phone    NATHALIE DON (Daughter) [phone] -- --    MAGY GALLEGOS [phone] -- --                 History & Physical        Casie Reardon MD at 03/25/24 2331          HISTORY AND PHYSICAL   [facility]        Date of Admission: 3/25/2024  Patient " Identification:  Name: Anthony Gallegos  Age: 79 y.o.  Sex: male  :  1945  MRN: [id number]                     Primary Care Physician: Provider, No Known    Chief Complaint:  79 year old gentleman presented to the emergency room with weakness and poor po intake; he lives in assisted living; [facility] as well as the ed provider; he has lost quite  a bit of weight and has become weaker and weaker; he had a chronic forde after dc from the hospital a few weeks ago which was recently removed;     History of Present Illness:   As above    Past Medical History:  Past Medical History:   Diagnosis Date    Abscess of scrotum     MARTITA (acute kidney injury)     Altered mental state     Arthritis     AS (ankylosing spondylitis)     Hypertension     Leukocytosis     Tetrahydrocannabinol (THC) use disorder, mild, abuse 2023    Uveitis      Past Surgical History:  Past Surgical History:   Procedure Laterality Date    COLONOSCOPY      ROTATOR CUFF REPAIR Right     TOTAL HIP ARTHROPLASTY Left       Home Meds:  Medications Prior to Admission   Medication Sig Dispense Refill Last Dose    acetaminophen (TYLENOL) 325 MG tablet Take 2 tablets by mouth Every 4 (Four) Hours As Needed for Mild Pain.       atenolol-chlorthalidone (TENORETIC) 50-25 MG per tablet Take 1 tablet by mouth Daily.       finasteride (PROSCAR) 5 MG tablet Take 1 tablet by mouth Daily.       melatonin 3 MG tablet Take 1 tablet by mouth At Night As Needed for Sleep.       methotrexate 2.5 MG tablet Take 1 tablet by mouth 2 (Two) Times a Week. Pt takes every Wednesday and thursday  5     Omega-3 Fatty Acids (OMEGA 3 PO) Take 1 capsule by mouth Daily.       pantoprazole (PROTONIX) 40 MG EC tablet Take 1 tablet by mouth Every Morning.       tamsulosin (FLOMAX) 0.4 MG capsule 24 hr capsule Take 1 capsule by mouth Every Night. 30 capsule 1        Allergies:  No Known Allergies  Immunizations:  Immunization History  "  Administered Date(s) Administered    COVID-19 (MODERNA) BIVALENT 12+YRS 2022    COVID-19 (MODERNA) Monovalent Original Booster 2021    COVID-19 (PFIZER) Purple Cap Monovalent 2021, 2021    Fluad Quad 65+ 10/07/2023    Pneumococcal Conjugate 13-Valent (PCV13) 2019    Pneumococcal Polysaccharide (PPSV23) 2020    Shingrix 2018    Tdap 2009, 2015, 2020     Social History:   Social History     Social History Narrative    Not on file     Social History     Socioeconomic History    Marital status:     Number of children: 2   Tobacco Use    Smoking status: Never    Smokeless tobacco: Never   Vaping Use    Vaping status: Never Used   Substance and Sexual Activity    Alcohol use: No    Drug use: No    Sexual activity: Defer       Family History:  Family History   Problem Relation Age of Onset    Alzheimer's disease Mother         Review of Systems  Not obtainable from the patient    Objective:  T Max 24 hrs: Temp (24hrs), Av.3 °F (36.8 °C), Min:98.3 °F (36.8 °C), Max:98.3 °F (36.8 °C)    Vitals Ranges:   Temp:  [98.3 °F (36.8 °C)] 98.3 °F (36.8 °C)  Heart Rate:  [79-85] 80  Resp:  [16-20] 16  BP: (110-125)/(55-79) 111/67      Exam:  /67 (BP Location: Left arm, Patient Position: Lying)   Pulse 80   Temp 98.3 °F (36.8 °C) (Tympanic)   Resp 16   Ht 190.5 cm (75\")   Wt 69.1 kg (152 lb 6.4 oz)   SpO2 98%   BMI 19.05 kg/m²     General Appearance:    Drowsy, thin, frail, chronically ill appearing   Head:    Normocephalic, without obvious abnormality, atraumatic; bitemporal wasting   Eyes:    PERRL, conjunctivae/corneas clear, EOM's intact, both eyes   Ears:    Normal external ear canals, both ears   Nose:   Nares normal, septum midline, mucosa normal, no drainage    or sinus tenderness   Throat:   Lips, mucosa, and tongue normal   Neck:   Supple, symmetrical, trachea midline, no adenopathy;     thyroid:  no enlargement/tenderness/nodules; no " carotid    bruit or JVD   Back:     Symmetric, no curvature, ROM normal, no CVA tenderness   Lungs:     Decreased breath sounds bilaterally, respirations unlabored   Chest Wall:    No tenderness or deformity    Heart:    Regular rate and rhythm, S1 and S2 normal, no murmur, rub   or gallop   Abdomen:     Soft, nontender, bowel sounds active all four quadrants,     no masses, no hepatomegaly, no splenomegaly   Extremities:   Extremities normal, atraumatic, no cyanosis or edema                       .    Data Review:  Labs in chart were reviewed.  WBC   Date Value Ref Range Status   03/25/2024 11.85 (H) 3.40 - 10.80 10*3/mm3 Final     Hemoglobin   Date Value Ref Range Status   03/25/2024 13.2 13.0 - 17.7 g/dL Final     Hematocrit   Date Value Ref Range Status   03/25/2024 41.3 37.5 - 51.0 % Final     Platelets   Date Value Ref Range Status   03/25/2024 471 (H) 140 - 450 10*3/mm3 Final     Sodium   Date Value Ref Range Status   03/25/2024 140 136 - 145 mmol/L Final     Potassium   Date Value Ref Range Status   03/25/2024 4.9 3.5 - 5.2 mmol/L Final     Chloride   Date Value Ref Range Status   03/25/2024 101 98 - 107 mmol/L Final     CO2   Date Value Ref Range Status   03/25/2024 24.0 22.0 - 29.0 mmol/L Final     BUN   Date Value Ref Range Status   03/25/2024 82 (H) 8 - 23 mg/dL Final     Creatinine   Date Value Ref Range Status   03/25/2024 2.50 (H) 0.76 - 1.27 mg/dL Final     Glucose   Date Value Ref Range Status   03/25/2024 125 (H) 65 - 99 mg/dL Final     Calcium   Date Value Ref Range Status   03/25/2024 9.7 8.6 - 10.5 mg/dL Final     Magnesium   Date Value Ref Range Status   03/25/2024 2.6 (H) 1.6 - 2.4 mg/dL Final       Results from last 7 days   Lab Units 03/25/24  1723   TSH uIU/mL 2.190          Imaging Results (All)       Procedure Component Value Units Date/Time    CT Abdomen Pelvis Without Contrast [[id number]] Collected: 03/25/24 1957     Updated: 03/25/24 2001    Narrative:      CT OF THE ABDOMEN AND PELVIS  WITHOUT CONTRAST 03/25/2024     HISTORY: Abdominal pain.     Spiral images were obtained from the lung bases to the symphysis pubis.  No intravenous or oral contrast was given.     There is streak artifact from patient's arms. The liver, gallbladder and  spleen appear unremarkable. Pancreas appears somewhat atrophic. The  adrenals appear unremarkable. Kidneys are obscured by streak artifact  but there may be some mild hydronephrosis bilaterally, greater on the  right than on the left and there may be at least 1 right renal cyst such  as on image 46.     There is moderate gaseous distention of the colon with moderate amount  of stool in the right colon. No small bowel dilatation is seen. Urinary  bladder is moderately distended. Multiple fluid collections are seen  adjacent to the superior aspect of the urinary bladder and these may  represent multiple urinary bladder diverticula but are somewhat more  numerous than on the previous study of 01/23/2024. There is wall  thickening of the urinary bladder as well. Left hip prosthesis produces  streak artifact and obscures the pelvis as well.       Impression:      1. Some of the images are obscured by streak artifact from patient's  arms and left hip prosthesis.  2. Kidneys are somewhat obscured but there may be mild-to-moderate  bilateral hydronephrosis and at least 1 right renal cyst.  3. Moderate gaseous distention of the colon with moderate amount of  stool in the right colon and rectum.  4. 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 These appear more extensive than on the 01/23/2024  study. Please correlate for urinary bladder outlet obstruction/partial  obstruction.     Radiation dose reduction techniques were utilized, including automated  exposure control and exposure modulation based on body size.          CT Head Without Contrast [[id number]] Collected: 03/25/24 1835     Updated: 03/25/24 1835    Narrative:      CT  OF THE BRAIN WITHOUT CONTRAST 03/25/2024     HISTORY: Mental status change.     Axial images were obtained through the brain without intravenous  contrast.     There is moderate diffuse atrophy. There is decreased attenuation of the  periventricular white matter bilaterally consistent with small vessel  white matter ischemic disease. There is no evidence of acute infarction,  hemorrhage, midline shift or mass effect.     No bony abnormalities are seen.       Impression:      No acute process identified.     Radiation dose reduction techniques were utilized, including automated  exposure control and exposure modulation based on body size.          XR Chest 1 View [[id number]] Collected: 03/25/24 1756     Updated: 03/25/24 1809    Narrative:      XR CHEST 1 VW-3/25/2024     HISTORY: Shortness of breath.     Heart size is within normal limits. Lungs appear free of acute  infiltrates. There is mild vascular congestion. There is some aortic  calcification.       Impression:      1. No acute process except for mild vascular congestion.        This report was finalized on 3/25/2024 6:06 PM by Dr. Dario Dumont M.D on Workstation: [id number]                 Assessment:  Active Hospital Problems    Diagnosis  POA    **UTI (urinary tract infection) [N39.0]  Yes      Resolved Hospital Problems   No resolved problems to display.   Sepsis  Underweight  Ankylosing spondylitis  Acute kidney injury  Weight loss  weakness    Plan:  Will continue antibiotics, fluids  Monitor on telemetry  Await cultures  Ask nephrology to see him  Dw patient, family and ed provider    Casie Reardon MD  3/25/2024  23:31 EDT      Electronically signed by Casie Reardon MD at 03/25/24 2338          Emergency Department Notes        Rosa Delgadillo, RN at 03/25/24 1748          Pt to ED from facility for failure to thrive, daughter at bedside reports pt has been getting weaker since January, not wanting to eat or drink much, + weight  "loss, now mostly non-ambulatory. States he has had some issues lately where when he eats he spits up. Discussed concerns for aspiration and states they were wanting to have a swallow eval done but had not gotten it set up yet, discussed npo for now and to see slp here. Daughter feels it would be to his benefit. MM dry     Electronically signed by Rosa Delgadillo RN at 03/25/24 1750       Ashleigh Early MD at 03/25/24 1642           EMERGENCY DEPARTMENT ENCOUNTER    Room Number:  123/1  PCP: Provider, No Known  Independent Historians: Family    HPI:  Chief Complaint: had concerns including Weakness - Generalized. And not eating and drinking     A complete HPI/ROS/PMH/PSH/SH/FH are unobtainable due to: Poor historian and Acutely ill and not able to provide history    Chronic or social conditions impacting patient care (Social Determinants of Health): None      Context: Anthony Gallegos is a 79 y.o. male with a medical history of \"arthritis\" on remicade, hypertension, and gerd who presents to the ED c/o acute generalized weakness, moaning in pain frequently, diarrhea, and decreased oral intake.  Family notes that patient tongue was dry and coated with \"white crust\" yesterday.  No fever, no vomiting, no shortness of breath, no cough.  Family remarks that patient has had a decline since January.  They show me a picture of patient in November where he did not appear as frail and definitely has had significant weight loss since then.  They remarked that he was admitted in January and found to have a urinary tract infection and was dehydrated at that time.  Since then and over the last month especially he has gotten much more debilitated although finally able to get his catheter removed recently.  He had been discharged with a catheter after the hospital stay.        Review of prior external notes (non-ED) -and- Review of prior external test results outside of this encounter: I reviewed patient's discharge summary " from January 31.  Patient was admitted with altered mental status and found to have a urinary tract I infection, CKD, and dehydration.  Nephrology and urology both saw patient.  He was noted to have some hypotensive episodes so his atenolol was stopped.  Patient completed a 10-day course of Augmentin.  Patient also required indwelling Mariscal catheter due to retention and BPH.  He was discharged with a catheter    Prescription drug monitoring program review:     N/A    PAST MEDICAL HISTORY  Active Ambulatory Problems     Diagnosis Date Noted    Ankylosing spondylitis of cervical region 06/29/2017    Recent unexplained weight loss 07/14/2017    Abnormal serum lipase level 08/10/2017    Abnormal serum level of amylase 08/10/2017    Hypertension 10/19/2017    Boil 03/22/2018    Immobility 12/20/2023    Fall from bed 12/20/2023    Tetrahydrocannabinol (THC) use disorder, mild, abuse 12/20/2023    Generalized pain 12/20/2023     12/20/2023    Grief 12/20/2023    DISH (disseminated idiopathic skeletal hyperostosis) 12/20/2023    Generalized weakness 12/21/2023    Severe malnutrition 12/24/2023    Altered mental status 01/21/2024    Transient alteration of awareness 01/22/2024    GERD without esophagitis 01/22/2024    BPH (benign prostatic hyperplasia) 01/22/2024    UTI (urinary tract infection) 01/22/2024    Dehydration 01/22/2024    Hyperglycemia 01/22/2024     Resolved Ambulatory Problems     Diagnosis Date Noted    Urine retention 12/20/2023    Constipation 12/20/2023    Leukocytosis 01/22/2024    MARTITA (acute kidney injury) 01/22/2024    Altered mental state 01/22/2024     Past Medical History:   Diagnosis Date    Abscess of scrotum     Arthritis     AS (ankylosing spondylitis)     Uveitis          PAST SURGICAL HISTORY  Past Surgical History:   Procedure Laterality Date    COLONOSCOPY      ROTATOR CUFF REPAIR Right     TOTAL HIP ARTHROPLASTY Left 2014         FAMILY HISTORY  Family History   Problem Relation Age  "of Onset    Alzheimer's disease Mother          SOCIAL HISTORY  Social History     Socioeconomic History    Marital status:     Number of children: 2   Tobacco Use    Smoking status: Never    Smokeless tobacco: Never   Vaping Use    Vaping status: Never Used   Substance and Sexual Activity    Alcohol use: No    Drug use: No    Sexual activity: Defer         ALLERGIES  Patient has no known allergies.        REVIEW OF SYSTEMS  Review of Systems  Included in HPI  All systems reviewed and negative except for those discussed in HPI.      PHYSICAL EXAM    I have reviewed the triage vital signs and nursing notes.    ED Triage Vitals [03/25/24 1516]   Temp Heart Rate Resp BP SpO2   98.3 °F (36.8 °C) 84 16 110/63 98 %      Temp src Heart Rate Source Patient Position BP Location FiO2 (%)   Tympanic Monitor Sitting Right arm --       Physical Exam  GENERAL: Ill-appearing thin frail male, alert, moans in pain while family is talking about his recent medical history then thanks maybe for \"visiting with him\"   SKIN: Warm, dry  HENT: Normocephalic, atraumatic, dry mucous membranes  EYES: no scleral icterus  CV: regular rhythm, regular rate  RESPIRATORY: normal effort, lungs clear, no wheezing, no rhonchi  ABDOMEN: soft, diffuse abdominal tenderness to palpation but no rebound and no guarding, nondistended  MUSCULOSKELETAL: no deformity, no lower extremity edema, no calf tenderness palpation  NEURO: alert, moves all extremities, follows limited commands       LAB RESULTS  Recent Results (from the past 24 hour(s))   Respiratory Panel PCR w/COVID-19(SARS-CoV-2) CALDERON/TRAM/KELLE/PAD/COR/JOSEPH In-House, NP Swab in UTM/VTM, 2 HR TAT - Swab, Nasopharynx    Collection Time: 03/25/24  5:22 PM    Specimen: Nasopharynx; Swab   Result Value Ref Range    ADENOVIRUS, PCR Not Detected Not Detected    Coronavirus 229E Not Detected Not Detected    Coronavirus HKU1 Not Detected Not Detected    Coronavirus NL63 Not Detected Not Detected    " Coronavirus OC43 Not Detected Not Detected    COVID19 Not Detected Not Detected - Ref. Range    Human Metapneumovirus Not Detected Not Detected    Human Rhinovirus/Enterovirus Not Detected Not Detected    Influenza A PCR Not Detected Not Detected    Influenza B PCR Not Detected Not Detected    Parainfluenza Virus 1 Not Detected Not Detected    Parainfluenza Virus 2 Not Detected Not Detected    Parainfluenza Virus 3 Not Detected Not Detected    Parainfluenza Virus 4 Not Detected Not Detected    RSV, PCR Not Detected Not Detected    Bordetella pertussis pcr Not Detected Not Detected    Bordetella parapertussis PCR Not Detected Not Detected    Chlamydophila pneumoniae PCR Not Detected Not Detected    Mycoplasma pneumo by PCR Not Detected Not Detected   Comprehensive Metabolic Panel    Collection Time: 03/25/24  5:23 PM    Specimen: Blood   Result Value Ref Range    Glucose 125 (H) 65 - 99 mg/dL    BUN 82 (H) 8 - 23 mg/dL    Creatinine 2.50 (H) 0.76 - 1.27 mg/dL    Sodium 140 136 - 145 mmol/L    Potassium 4.9 3.5 - 5.2 mmol/L    Chloride 101 98 - 107 mmol/L    CO2 24.0 22.0 - 29.0 mmol/L    Calcium 9.7 8.6 - 10.5 mg/dL    Total Protein 9.5 (H) 6.0 - 8.5 g/dL    Albumin 3.0 (L) 3.5 - 5.2 g/dL    ALT (SGPT) 15 1 - 41 U/L    AST (SGOT) 16 1 - 40 U/L    Alkaline Phosphatase 112 39 - 117 U/L    Total Bilirubin 0.6 0.0 - 1.2 mg/dL    Globulin 6.5 gm/dL    A/G Ratio 0.5 g/dL    BUN/Creatinine Ratio 32.8 (H) 7.0 - 25.0    Anion Gap 15.0 5.0 - 15.0 mmol/L    eGFR 25.3 (L) >60.0 mL/min/1.73   Lipase    Collection Time: 03/25/24  5:23 PM    Specimen: Blood   Result Value Ref Range    Lipase 62 (H) 13 - 60 U/L   BNP    Collection Time: 03/25/24  5:23 PM    Specimen: Blood   Result Value Ref Range    proBNP 783.0 0.0 - 1,800.0 pg/mL   High Sensitivity Troponin T    Collection Time: 03/25/24  5:23 PM    Specimen: Blood   Result Value Ref Range    HS Troponin T 51 (H) <22 ng/L   Lactic Acid, Plasma    Collection Time: 03/25/24  5:23  PM    Specimen: Blood   Result Value Ref Range    Lactate 2.4 (C) 0.5 - 2.0 mmol/L   Procalcitonin    Collection Time: 03/25/24  5:23 PM    Specimen: Blood   Result Value Ref Range    Procalcitonin 3.36 (H) 0.00 - 0.25 ng/mL   CK    Collection Time: 03/25/24  5:23 PM    Specimen: Blood   Result Value Ref Range    Creatine Kinase 29 20 - 200 U/L   Magnesium    Collection Time: 03/25/24  5:23 PM    Specimen: Blood   Result Value Ref Range    Magnesium 2.6 (H) 1.6 - 2.4 mg/dL   TSH    Collection Time: 03/25/24  5:23 PM    Specimen: Blood   Result Value Ref Range    TSH 2.190 0.270 - 4.200 uIU/mL   CBC Auto Differential    Collection Time: 03/25/24  5:23 PM    Specimen: Blood   Result Value Ref Range    WBC 11.85 (H) 3.40 - 10.80 10*3/mm3    RBC 4.79 4.14 - 5.80 10*6/mm3    Hemoglobin 13.2 13.0 - 17.7 g/dL    Hematocrit 41.3 37.5 - 51.0 %    MCV 86.2 79.0 - 97.0 fL    MCH 27.6 26.6 - 33.0 pg    MCHC 32.0 31.5 - 35.7 g/dL    RDW 13.1 12.3 - 15.4 %    RDW-SD 41.3 37.0 - 54.0 fl    MPV 9.4 6.0 - 12.0 fL    Platelets 471 (H) 140 - 450 10*3/mm3   Green Top (Gel)    Collection Time: 03/25/24  5:23 PM   Result Value Ref Range    Extra Tube Hold for add-ons.    Lavender Top    Collection Time: 03/25/24  5:23 PM   Result Value Ref Range    Extra Tube hold for add-on    Gold Top - SST    Collection Time: 03/25/24  5:23 PM   Result Value Ref Range    Extra Tube Hold for add-ons.    Light Blue Top    Collection Time: 03/25/24  5:23 PM   Result Value Ref Range    Extra Tube Hold for add-ons.    Manual Differential    Collection Time: 03/25/24  5:23 PM    Specimen: Blood   Result Value Ref Range    Neutrophil % 73.5 42.7 - 76.0 %    Lymphocyte % 14.3 (L) 19.6 - 45.3 %    Monocyte % 12.2 (H) 5.0 - 12.0 %    Neutrophils Absolute 8.71 (H) 1.70 - 7.00 10*3/mm3    Lymphocytes Absolute 1.69 0.70 - 3.10 10*3/mm3    Monocytes Absolute 1.45 (H) 0.10 - 0.90 10*3/mm3    RBC Morphology Normal Normal    WBC Morphology Normal Normal    Platelet  Morphology Normal Normal   ECG 12 Lead Altered Mental Status    Collection Time: 03/25/24  5:32 PM   Result Value Ref Range    QT Interval 395 ms    QTC Interval 462 ms   Urinalysis With Microscopic If Indicated (No Culture) - Straight Cath    Collection Time: 03/25/24  7:18 PM    Specimen: Straight Cath; Urine   Result Value Ref Range    Color, UA Yellow Yellow, Straw    Appearance, UA Turbid (A) Clear    pH, UA 7.0 5.0 - 8.0    Specific Gravity, UA 1.011 1.005 - 1.030    Glucose, UA Negative Negative    Ketones, UA Negative Negative    Bilirubin, UA Negative Negative    Blood, UA Large (3+) (A) Negative    Protein, UA >=300 mg/dL (3+) (A) Negative    Leuk Esterase, UA Large (3+) (A) Negative    Nitrite, UA Negative Negative    Urobilinogen, UA 0.2 E.U./dL 0.2 - 1.0 E.U./dL   Urinalysis, Microscopic Only - Straight Cath    Collection Time: 03/25/24  7:18 PM    Specimen: Straight Cath; Urine   Result Value Ref Range    RBC, UA 3-5 (A) None Seen, 0-2 /HPF    WBC, UA Too Numerous to Count (A) None Seen, 0-2 /HPF    Bacteria, UA 4+ (A) None Seen /HPF    Squamous Epithelial Cells, UA None Seen None Seen, 0-2 /HPF    Hyaline Casts, UA None Seen None Seen /LPF    Methodology Manual Light Microscopy    STAT Lactic Acid, Reflex    Collection Time: 03/25/24  7:56 PM    Specimen: Arm, Right; Blood   Result Value Ref Range    Lactate 2.1 (C) 0.5 - 2.0 mmol/L   High Sensitivity Troponin T 2Hr    Collection Time: 03/25/24  8:10 PM    Specimen: Arm, Left; Blood   Result Value Ref Range    HS Troponin T 45 (H) <22 ng/L    Troponin T Delta -6 (L) >=-4 - <+4 ng/L         RADIOLOGY  CT Abdomen Pelvis Without Contrast    Result Date: 3/25/2024  CT OF THE ABDOMEN AND PELVIS WITHOUT CONTRAST 03/25/2024  HISTORY: Abdominal pain.  Spiral images were obtained from the lung bases to the symphysis pubis. No intravenous or oral contrast was given.  There is streak artifact from patient's arms. The liver, gallbladder and spleen appear  unremarkable. Pancreas appears somewhat atrophic. The adrenals appear unremarkable. Kidneys are obscured by streak artifact but there may be some mild hydronephrosis bilaterally, greater on the right than on the left and there may be at least 1 right renal cyst such as on image 46.  There is moderate gaseous distention of the colon with moderate amount of stool in the right colon. No small bowel dilatation is seen. Urinary bladder is moderately distended. Multiple fluid collections are seen adjacent to the superior aspect of the urinary bladder and these may represent multiple urinary bladder diverticula but are somewhat more numerous than on the previous study of 01/23/2024. There is wall thickening of the urinary bladder as well. Left hip prosthesis produces streak artifact and obscures the pelvis as well.      1. Some of the images are obscured by streak artifact from patient's arms and left hip prosthesis. 2. Kidneys are somewhat obscured but there may be mild-to-moderate bilateral hydronephrosis and at least 1 right renal cyst. 3. Moderate gaseous distention of the colon with moderate amount of stool in the right colon and rectum. 4. Wall thickening of the urinary bladder with several urinary bladder diverticula as seen. These appear more extensive than on the 01/23/2024 study. Please correlate for urinary bladder outlet obstruction/partial obstruction.  Radiation dose reduction techniques were utilized, including automated exposure control and exposure modulation based on body size.       CT Head Without Contrast    Result Date: 3/25/2024  CT OF THE BRAIN WITHOUT CONTRAST 03/25/2024  HISTORY: Mental status change.  Axial images were obtained through the brain without intravenous contrast.  There is moderate diffuse atrophy. There is decreased attenuation of the periventricular white matter bilaterally consistent with small vessel white matter ischemic disease. There is no evidence of acute infarction,  hemorrhage, midline shift or mass effect.  No bony abnormalities are seen.      No acute process identified.  Radiation dose reduction techniques were utilized, including automated exposure control and exposure modulation based on body size.       XR Chest 1 View    Result Date: 3/25/2024  XR CHEST 1 VW-3/25/2024  HISTORY: Shortness of breath.  Heart size is within normal limits. Lungs appear free of acute infiltrates. There is mild vascular congestion. There is some aortic calcification.      1. No acute process except for mild vascular congestion.   This report was finalized on 3/25/2024 6:06 PM by Dr. Dario Dumont M.D on Workstation: [id number]         MEDICATIONS GIVEN IN ER  Medications   sodium chloride 0.9 % flush 10 mL (10 mL Intravenous Given 3/25/24 1719)   acetaminophen (TYLENOL) tablet 650 mg (has no administration in time range)   ondansetron ODT (ZOFRAN-ODT) disintegrating tablet 4 mg (has no administration in time range)     Or   ondansetron (ZOFRAN) injection 4 mg (has no administration in time range)   melatonin tablet 3 mg (has no administration in time range)   sennosides-docusate (PERICOLACE) 8.6-50 MG per tablet 2 tablet (has no administration in time range)     And   polyethylene glycol (MIRALAX) packet 17 g (has no administration in time range)     And   bisacodyl (DULCOLAX) EC tablet 5 mg (has no administration in time range)     And   bisacodyl (DULCOLAX) suppository 10 mg (has no administration in time range)   sodium chloride 0.9 % bolus 500 mL (0 mL Intravenous Stopped 3/25/24 1744)   ondansetron (ZOFRAN) injection 4 mg (4 mg Intravenous Given 3/25/24 1739)   fentaNYL citrate (PF) (SUBLIMAZE) injection 25 mcg (25 mcg Intravenous Given 3/25/24 1740)   piperacillin-tazobactam (ZOSYN) 3.375 g IVPB in 100 mL NS MBP (CD) (3.375 g Intravenous New Bag 3/25/24 2012)         ORDERS PLACED DURING THIS VISIT:  Orders Placed This Encounter   Procedures    Respiratory Panel PCR  w/COVID-19(SARS-CoV-2) [facility]/TRAM/KELLE/PAD/COR/JOSEPH In-House, NP Swab in UTM/VTM, 2 HR TAT - Swab, Nasopharynx    Blood Culture - Blood,    Blood Culture - Blood,    XR Chest 1 View    CT Head Without Contrast    CT Abdomen Pelvis Without Contrast    Comprehensive Metabolic Panel    Lipase    Urinalysis With Microscopic If Indicated (No Culture) - Urine, Catheter    BNP    High Sensitivity Troponin T    Lactic Acid, Plasma    Procalcitonin    CK    Magnesium    TSH    CBC Auto Differential    [place] Draw    Manual Differential    High Sensitivity Troponin T 2Hr    STAT Lactic Acid, Reflex    Urinalysis, Microscopic Only - Urine, Clean Catch    Basic Metabolic Panel    CBC (No Diff)    STAT Lactic Acid, Reflex    Diet: Cardiac; Healthy Heart (2-3 Na+); Fluid Consistency: Thin (IDDSI 0)    NPO Diet NPO Type: Strict NPO    Vital Signs    Up with assistance    Daily Weights    Strict Intake & Output    Oral Care    Place Sequential Compression Device    Maintain Sequential Compression Device    Code Status and Medical Interventions:    [facility] (on-call MD unless specified) Details    Inpatient Case Management  Consult    Inpatient Nutrition Consult    SLP Consult: Eval & Treat RN Dysphagia Screen Failed    ECG 12 Lead Altered Mental Status    Wound Ostomy Eval & Treat    Insert Peripheral IV    Inpatient Admission    CBC & Differential    Green Top (Gel)    Lavender Top    Gold Top - SST    Light Blue Top         OUTPATIENT MEDICATION MANAGEMENT:  Current Facility-Administered Medications Ordered in Epic   Medication Dose Route Frequency Provider Last Rate Last Admin    acetaminophen (TYLENOL) tablet 650 mg  650 mg Oral Q4H PRN Casie Reardon MD        sennosides-docusate (PERICOLACE) 8.6-50 MG per tablet 2 tablet  2 tablet Oral BID PRN Casie Reardon MD        And    polyethylene glycol (MIRALAX) packet 17 g  17 g Oral Daily PRN Casie Reardon MD        And    bisacodyl (DULCOLAX) EC  tablet 5 mg  5 mg Oral Daily PRN Casie Reardon MD        And    bisacodyl (DULCOLAX) suppository 10 mg  10 mg Rectal Daily PRN Casie Reardon MD        melatonin tablet 3 mg  3 mg Oral Nightly PRN Casie Reardon MD        ondansetron ODT (ZOFRAN-ODT) disintegrating tablet 4 mg  4 mg Oral Q6H PRN Casie Reardon MD        Or    ondansetron (ZOFRAN) injection 4 mg  4 mg Intravenous Q6H PRN Casie Reardon MD        sodium chloride 0.9 % flush 10 mL  10 mL Intravenous PRN Ashleigh Early MD   10 mL at 03/25/24 1719     No current [facility]-ordered outpatient medications on file.         PROCEDURES  Procedures            PROGRESS, DATA ANALYSIS, CONSULTS, AND MEDICAL DECISION MAKING  All labs have been independently interpreted by me.  All radiology studies have been reviewed by me. All EKG's have been independently viewed and interpreted by me.  Discussion below represents my analysis of pertinent findings related to patient's condition, differential diagnosis, treatment plan and final disposition.    Differential diagnosis includes but is not limited to   This patient presents with altered mental status and generalized weakness in the setting of chronic debility and decreased oral intake.  The differential is broad and includes metabolic disturbances (hyper/hyponatremia, hypercalcemia, hypo/hyperglycemia, renal failure with uremia), infection/sepsis, alcohol intoxication or withdrawal, hypoxia/hypercapnia, hepatic encephalopathy, hyper/hypothyroidism, adrenal insufficiency, seizure, trauma, ICH, CVA.  Given the broad differential, plan to monitor patient and obtain EKG, accucheck, serum labs to include electrolytes, cbc, lfts, troponin, urinalysis, CT head, CT abdomen pelvis and chest xray.  Patient will likely require admission.    Initial laboratory studies significant for acute kidney injury with BUN 82 and creatinine 2.5.  On labs done January 29 patient's BUN was 13 and  creatinine 0.66.  Patient also with elevated lactic acid of 2.4.  Patient's lipase is elevated at 62.  Patient's procalcitonin is greater than 1 as well.  Patient's white blood cell count is 11.8 with no left shift.  Patient has an elevated troponin at 51.      ED Course as of 03/25/24 2156   Mon Mar 25, 2024   1800 I viewed patient's chest x-ray and on my interpretation patient has normal heart size and mild vascular congestion without any effusions or pulmonary consolidation [AR]   1832 EKG ER MD interpretation   Time: 17: 32  Rhythm and rate: Normal sinus rhythm at a rate of 82  Axis: Normal  P waves: Normal  QRS complexes: Normal except for LVH  ST segments: no elevation nor depressions  T waves: no flattening or inversions   [AR]   1905 Care transferred to Dr. Hogan pending ct a/p and UA--[facility] admit expected [AR]   1907 Total Protein(!): 9.5 [FS]   1907 Albumin(!): 3.0 [FS]   1917 CT abdomen pelvis interpreted myself:  Severe wall thickening of the bladder with bladder diverticulum anteriorly, some gas noted at the anterior diverticulum of the bladder [FS]   1926 Discussed case with Dr. Reardon with [facility], will admit for further workup and management.  Ordered antibiotics for urinary tract infection. [FS]   2036 Lactate(!!): 2.1 [FS]   2036 Bacteria, UA(!): 4+ [FS]   2036 WBC, UA(!): Too Numerous to Count [FS]      ED Course User Index  [AR] Ashleigh Early MD  [FS] Charli Hogan MD             AS OF 21:56 EDT VITALS:    BP - 111/67  HR - 80  TEMP - 98.3 °F (36.8 °C) (Tympanic)  O2 SATS - 98%      COMPLEXITY OF CARE  The patient requires admission.    DIAGNOSIS  Final diagnoses:   Decreased oral intake   MARTITA (acute kidney injury)   Whole body pain   Dysphagia, unspecified type   Dehydration   Elevated troponin   Failure to thrive in adult   Generalized weakness   Weight loss   Urinary tract infection in male         DISPOSITION  ED Disposition       ED Disposition   Decision to Admit    Condition   --     Comment   Level of Care: Telemetry [5]   Diagnosis: UTI (urinary tract infection) [683162]   Admitting Physician: JOHN BOWEN [[id number]]   Attending Physician: JOHN BOWEN [[id number]]   Certification: I Certify That Inpatient Hospital Services Are Medically Necessary For Greater Than 2 Midnights                  Please note that portions of this document were completed with a voice recognition program.    Note Disclaimer: At [facility], we believe that sharing information builds trust and better relationships. You are receiving this note because you recently visited [facility]. It is possible you will see health information before a provider has talked with you about it. This kind of information can be easy to misunderstand. To help you fully understand what it means for your health, we urge you to discuss this note with your provider.         Ashleigh Early MD  03/25/24 2156      Electronically signed by Ashleigh Early MD at 03/25/24 2156       Vital Signs (last 3 days)       Date/Time Temp Temp src Pulse Resp BP Patient Position SpO2    03/27/24 1118 97.5 (36.4) Oral 70 18 112/60 Lying 90    03/27/24 0739 97.5 (36.4) Oral 77 18 120/51 Lying 100    03/27/24 0555 -- -- -- 16 109/56 Lying --    03/26/24 2350 97.3 (36.3) Oral 82 16 109/55 -- 100    03/26/24 1937 98.6 (37) Oral 86 16 128/71 Lying 97    03/26/24 1302 98.3 (36.8) Oral 84 16 120/69 Lying 94    03/26/24 0732 98.5 (36.9) Oral 78 16 126/74 Lying --    03/26/24 0636 -- -- -- 16 120/68 Lying 99    03/26/24 0000 97.9 (36.6) Oral 81 14 99/54 -- 100    03/25/24 2102 -- -- 80 16 111/67 Lying 98    03/25/24 2031 -- -- 79 -- 120/61 -- 99    03/25/24 1931 -- -- 80 20 110/55 -- 98    03/25/24 1915 -- -- 80 16 112/79 -- 100    03/25/24 1901 -- -- 85 -- 112/79 -- 99    03/25/24 1731 -- -- 81 16 125/73 -- 98    03/25/24 1516 98.3 (36.8) Tympanic 84 16 110/63 Sitting 98          Oxygen Therapy (last 3 days)       Date/Time SpO2 Device  (Oxygen Therapy) Flow (L/min) Oxygen Concentration (%) ETCO2 (mmHg)    03/27/24 1118 90 room air -- -- --    03/27/24 0859 -- room air -- -- --    03/27/24 0739 100 room air -- -- --    03/27/24 0555 -- room air -- -- --    03/26/24 2350 100 -- -- -- --    03/26/24 1937 97 -- -- -- --    03/26/24 1429 -- room air -- -- --    03/26/24 1302 94 room air -- -- --    03/26/24 0804 -- room air -- -- --    03/26/24 0732 -- room air -- -- --    03/26/24 0636 99 room air -- -- --    03/26/24 0200 -- room air -- -- --    03/26/24 0000 100 -- -- -- --    03/25/24 2129 -- room air -- -- --    03/25/24 2102 98 room air -- -- --    03/25/24 2031 99 -- -- -- --    03/25/24 1931 98 -- -- -- --    03/25/24 1915 100 room air -- -- --    03/25/24 1901 99 -- -- -- --    03/25/24 1731 98 room air -- -- --    03/25/24 1516 98 room air -- -- --          Intake & Output (last 3 days)         03/24 0701  03/25 0700 03/25 0701 03/26 0700 03/26 0701  03/27 0700 03/27 0701  03/28 0700    Other   10     Total Intake(mL/kg)   10 (0.1)     Urine (mL/kg/hr)   3400 (1.9)     Total Output   3400     Net   -3390             Urine Unmeasured Occurrence   1 x            Lines, Drains & Airways       Active LDAs       Name Placement date Placement time Site Days    Peripheral IV 03/25/24 1716 Anterior;Proximal;Right Forearm 03/25/24  1716  Forearm  1    Urethral Catheter Silicone 18 Fr. 03/26/24  1229  -- 1                  Medication Administration Report for Anthony Gallegos as of 3/25/24 through 3/27/24     Legend:    Given Hold Not Given Due Canceled Entry Other Actions    Time Time (Time) Time Time-Action         Discontinued     Completed     Future     MAR Hold     Linked             Medications 03/25/24 03/26/24 03/27/24     acetaminophen (TYLENOL) tablet 650 mg  Dose: 650 mg  Freq: Every 4 Hours PRN Route: PO  PRN Reason: Mild Pain  Start: 03/25/24 1943     Admin Instructions:   Do not exceed 4 grams of acetaminophen in a 24 hr period.    If  given for pain, use the following pain scale:   Mild Pain = Pain Score of 1-3, CPOT 1-2  Moderate Pain = Pain Score of 4-6, CPOT 3-4  Severe Pain = Pain Score of 7-10, CPOT 5-8  Based on patient request - if ordered for moderate or severe pain, provider allows for administration of a medication prescribed for a lower pain scale.    Do not exceed 4 grams of acetaminophen in a 24 hr period. Max dose of 2gm for AST/ALT greater than 120 units/L.    If given for pain, use the following pain scale:   Mild Pain = Pain Score of 1-3, CPOT 1-2  Moderate Pain = Pain Score of 4-6, CPOT 3-4  Severe Pain = Pain Score of 7-10, CPOT 5-8            sennosides-docusate (PERICOLACE) 8.6-50 MG per tablet 2 tablet  Dose: 2 tablet  Freq: 2 Times Daily Route: PO  Start: 03/26/24 0915     Admin Instructions:   Start bowel management regimen if patient has not had a bowel movement after 12 hours.       (0819)-Not Given     (2016)-Not Given         (0804)-Not Given     2100           And   polyethylene glycol (MIRALAX) packet 17 g  Dose: 17 g  Freq: 2 Times Daily Route: PO  Start: 03/26/24 0915     Admin Instructions:   Hold for loose stools   Use 4-8 ounces of water, tea, or juice for each 17 gram dose.       (0819)-Not Given     (2016)-Not Given         (0804)-Not Given     2100           And   bisacodyl (DULCOLAX) EC tablet 5 mg  Dose: 5 mg  Freq: Daily PRN Route: PO  PRN Reason: Constipation  PRN Comment: Use if polyethylene glycol is ineffective  Start: 03/26/24 0817     Admin Instructions:   Use if no bowel movement after 12 hours.  Swallow whole. Do not crush, split, or chew tablet.           And   bisacodyl (DULCOLAX) suppository 10 mg  Dose: 10 mg  Freq: Daily PRN Route: RE  PRN Reason: Constipation  PRN Comment: Use if bisacodyl oral is ineffective  Start: 03/26/24 0817     Admin Instructions:   Use if no bowel movement after 12 hours.  Hold for diarrhea           bisacodyl (DULCOLAX) suppository 10 mg  Dose: 10 mg  Freq: Once  Route: RE  Start: 03/26/24 0915     Admin Instructions:   Hold for diarrhea       (0955)-Not Given             dextrose (D50W) (25 g/50 mL) IV injection 25 g  Dose: 25 g  Freq: Every 15 Minutes PRN Route: IV  PRN Reason: Low Blood Sugar  PRN Comment: Blood Sugar Less Than 70, Patient Has IV Access - Unresponsive, NPO or Unable To Safely Swallow  Start: 03/26/24 1847           dextrose (GLUTOSE) oral gel 15 g  Dose: 15 g  Freq: Every 15 Minutes PRN Route: PO  PRN Reason: Low Blood Sugar  PRN Comment: Blood Sugar Less Than 70, Patient Alert, Is Not NPO & Can Safely Swallow  Start: 03/26/24 1847           dextrose 5 % and sodium chloride 0.9 % infusion  Rate: 100 mL/hr Dose: 100 mL/hr  Freq: Continuous Route: IV  Start: 03/26/24 2030 2013-New Bag             finasteride (PROSCAR) tablet 5 mg  Dose: 5 mg  Freq: Daily Route: PO  Start: 03/26/24 0900     Admin Instructions:   Do not crush or chew the capsules or tablets. Contact Pharmacy if needed.  Group 2 ([place]) Hazardous Drug - Reproductive Risk Only - See Handling Guide       (0804)-Not Given          (0804)-Not Given            glucagon (GLUCAGEN) injection 1 mg  Dose: 1 mg  Freq: Every 15 Minutes PRN Route: SC  PRN Reason: Low Blood Sugar  PRN Comment: Blood Glucose Less Than 70 - Patient Without IV Access - Unresponsive, NPO or Unable To Safely Swallow  Start: 03/26/24 1847     Admin Instructions:   Reconstitute powder for injection by adding 1 mL of -supplied sterile diluent or sterile water for injection to a vial containing 1 mg of the drug, to provide solutions containing 1 mg/mL. Shake vial gently to dissolve.           heparin (porcine) 5000 UNIT/ML injection 5,000 Units  Dose: 5,000 Units  Freq: Every 8 Hours Scheduled Route: SC  Indications of Use: PROPHYLAXIS OF VENOUS THROMBOEMBOLISM  Start: 03/26/24 1400       1516-Given     2330-Given         0555-Given     1400     2200          melatonin tablet 3 mg  Dose: 3 mg  Freq: Nightly PRN  Route: PO  PRN Reason: Sleep  Start: 03/25/24 1943           Menthol-Zinc Oxide 1 Application  Dose: 1 Application  Freq: Every 12 Hours Scheduled Route: TOP  Start: 03/26/24 1215       (1242)-Not Given     (2326)-Not Given         0859-Given     2100            ondansetron ODT (ZOFRAN-ODT) disintegrating tablet 4 mg  Dose: 4 mg  Freq: Every 6 Hours PRN Route: PO  PRN Reasons: Nausea,Vomiting  Start: 03/25/24 1943     Admin Instructions:   If BOTH ondansetron (ZOFRAN) and promethazine (PHENERGAN) are ordered use ondansetron first and THEN promethazine IF ondansetron is ineffective.  Place on tongue and allow to dissolve.           Or   ondansetron (ZOFRAN) injection 4 mg  Dose: 4 mg  Freq: Every 6 Hours PRN Route: IV  PRN Reasons: Nausea,Vomiting  Start: 03/25/24 1943           pantoprazole (PROTONIX) EC tablet 40 mg  Dose: 40 mg  Freq: Every Early Morning Route: PO  Start: 03/26/24 0600     Admin Instructions:   Do not crush or chew the capsules or tablets. The drug may not work as designed if the capsule or tablet is crushed or chewed. Swallow whole.  Swallow whole; do not crush, split, or chew.       (0534)-Not Given          (0553)-Not Given            piperacillin-tazobactam (ZOSYN) 3.375 g IVPB in 100 mL NS MBP (CD)  Dose: 3.375 g  Freq: Every 8 Hours Route: IV  Indications of Use: SEPSIS,URINARY TRACT INFECTION  Start: 03/26/24 0300 End: 03/31/24 0259     Order specific questions:   Request for Conventional Infusion? No, continue with extended infusion       0343-New Bag     1044-New Bag     1836-New Bag        0330-New Bag     1145-New Bag     1900           sodium chloride 0.9 % flush 10 mL  Dose: 10 mL  Freq: As Needed Route: IV  PRN Reason: Line Care  Start: 03/25/24 1702      1719-Given              tamsulosin (FLOMAX) 24 hr capsule 0.4 mg  Dose: 0.4 mg  Freq: Nightly Route: PO  Start: 03/26/24 0030     Admin Instructions:   Do not crush or chew the capsules or tablets. The drug may not work as designed  "if the capsule or tablet is crushed or chewed. Swallow whole.       (0001)-Not Given     (2016)-Not Given         2100            Completed Medications  Medications 03/25/24 03/26/24 03/27/24      fentaNYL citrate (PF) (SUBLIMAZE) injection 25 mcg  Dose: 25 mcg  Freq: Once Route: IV  Start: 03/25/24 1719 End: 03/25/24 1740     Admin Instructions:   Use filter needle to withdraw dose from ampule. Based on patient request - if ordered for moderate or severe pain, provider allows for administration of a medication prescribed for a lower pain scale.  If given for pain, use the following pain scale:  Mild Pain = Pain Score of 1-3, CPOT 1-2  Moderate Pain = Pain Score of 4-6, CPOT 3-4  Severe Pain = Pain Score of 7-10, CPOT 5-8      1740-Given              ondansetron (ZOFRAN) injection 4 mg  Dose: 4 mg  Freq: Once Route: IV  Start: 03/25/24 1719 End: 03/25/24 1739     Admin Instructions:   \"If multiple N/V medications ordered, use in the following order: Ondansetron, Prochlorperazine, Promethazine. Use PO unless patient refuses or patient unable to swallow.\"      1739-Given              piperacillin-tazobactam (ZOSYN) 3.375 g IVPB in 100 mL NS MBP (CD)  Dose: 3.375 g  Freq: Once Route: IV  Indications of Use: URINARY TRACT INFECTION  Start: 03/25/24 1938 End: 03/25/24 2042 2012-New Bag              sodium chloride 0.9 % bolus 500 mL  Dose: 500 mL  Freq: Once Route: IV  Start: 03/25/24 1719 End: 03/25/24 1744      1720-New Bag     1744-Stopped             Discontinued Medications  Medications 03/25/24 03/26/24 03/27/24       sennosides-docusate (PERICOLACE) 8.6-50 MG per tablet 2 tablet  Dose: 2 tablet  Freq: 2 Times Daily Route: PO  Start: 03/26/24 0915 End: 03/26/24 0818     Admin Instructions:   Start bowel management regimen if patient has not had a bowel movement after 12 hours.           And   polyethylene glycol (MIRALAX) packet 17 g  Dose: 17 g  Freq: Daily PRN Route: PO  PRN Reason: Constipation  PRN " Comment: Use if senna-docusate is ineffective  Start: 03/26/24 0817 End: 03/26/24 0818     Admin Instructions:   Use if no bowel movement after 12 hours. Mix in 6-8 ounces of water.  Use 4-8 ounces of water, tea, or juice for each 17 gram dose.           And   bisacodyl (DULCOLAX) EC tablet 5 mg  Dose: 5 mg  Freq: Daily PRN Route: PO  PRN Reason: Constipation  PRN Comment: Use if polyethylene glycol is ineffective  Start: 03/26/24 0817 End: 03/26/24 0818     Admin Instructions:   Use if no bowel movement after 12 hours.  Swallow whole. Do not crush, split, or chew tablet.           And   bisacodyl (DULCOLAX) suppository 10 mg  Dose: 10 mg  Freq: Daily PRN Route: RE  PRN Reason: Constipation  PRN Comment: Use if bisacodyl oral is ineffective  Start: 03/26/24 0817 End: 03/26/24 0818     Admin Instructions:   Use if no bowel movement after 12 hours.  Hold for diarrhea            sennosides-docusate (PERICOLACE) 8.6-50 MG per tablet 2 tablet  Dose: 2 tablet  Freq: 2 Times Daily PRN Route: PO  PRN Reason: Constipation  Start: 03/25/24 1943 End: 03/26/24 0817     Admin Instructions:   Start bowel management regimen if patient has not had a bowel movement after 12 hours.           And   polyethylene glycol (MIRALAX) packet 17 g  Dose: 17 g  Freq: Daily PRN Route: PO  PRN Reason: Constipation  PRN Comment: Use if senna-docusate is ineffective  Start: 03/25/24 1943 End: 03/26/24 0817     Admin Instructions:   Use if no bowel movement after 12 hours. Mix in 6-8 ounces of water.  Use 4-8 ounces of water, tea, or juice for each 17 gram dose.           And   bisacodyl (DULCOLAX) EC tablet 5 mg  Dose: 5 mg  Freq: Daily PRN Route: PO  PRN Reason: Constipation  PRN Comment: Use if polyethylene glycol is ineffective  Start: 03/25/24 1943 End: 03/26/24 0817     Admin Instructions:   Use if no bowel movement after 12 hours.  Swallow whole. Do not crush, split, or chew tablet.           And   bisacodyl (DULCOLAX) suppository 10  mg  Dose: 10 mg  Freq: Daily PRN Route: RE  PRN Reason: Constipation  PRN Comment: Use if bisacodyl oral is ineffective  Start: 03/25/24 1943 End: 03/26/24 0817     Admin Instructions:   Use if no bowel movement after 12 hours.  Hold for diarrhea           Pharmacy to Dose Zosyn  Freq: Continuous PRN Route: XX  PRN Reason: Consult  Indications of Use: SEPSIS,URINARY TRACT INFECTION  Start: 03/25/24 2338 End: 03/26/24 0813           sodium chloride 0.9 % infusion  Rate: 125 mL/hr Dose: 125 mL/hr  Freq: Continuous Route: IV  Start: 03/26/24 0030 End: 03/26/24 2106       0000-New Bag     1516-New Bag                   Lab Results (all)       Procedure Component Value Units Date/Time    POC Glucose Once [[id number]]  (Normal) Collected: 03/27/24 1118    Specimen: Blood Updated: 03/27/24 1119     Glucose 107 mg/dL     CBC & Differential [[id number]]  (Abnormal) Collected: 03/27/24 1015    Specimen: Blood Updated: 03/27/24 1043    Narrative:      The following orders were created for panel order CBC & Differential.  Procedure                               Abnormality         Status                     ---------                               -----------         ------                     CBC Auto Differential[[id number]]        Abnormal            Final result                 Please view results for these tests on the individual orders.    CBC Auto Differential [[id number]]  (Abnormal) Collected: 03/27/24 1015    Specimen: Blood Updated: 03/27/24 1043     WBC 14.65 10*3/mm3      RBC 4.23 10*6/mm3      Hemoglobin 11.8 g/dL      Hematocrit 36.6 %      MCV 86.5 fL      MCH 27.9 pg      MCHC 32.2 g/dL      RDW 13.2 %      RDW-SD 41.0 fl      MPV 9.4 fL      Platelets 389 10*3/mm3      Neutrophil % 76.0 %      Lymphocyte % 11.7 %      Monocyte % 9.8 %      Eosinophil % 1.2 %      Basophil % 0.5 %      Immature Grans % 0.8 %      Neutrophils, Absolute 11.12 10*3/mm3      Lymphocytes, Absolute 1.71 10*3/mm3      Monocytes, Absolute  1.44 10*3/mm3      Eosinophils, Absolute 0.18 10*3/mm3      Basophils, Absolute 0.08 10*3/mm3      Immature Grans, Absolute 0.12 10*3/mm3      nRBC 0.0 /100 WBC     Urine Culture - Urine, Straight Cath [[id number]]  (Abnormal) Collected: 03/25/24 1918    Specimen: Urine from Straight Cath Updated: 03/27/24 1001     Urine Culture >100,000 CFU/mL Gram Negative Bacilli    Narrative:      Colonization of the urinary tract without infection is common. Treatment is discouraged unless the patient is symptomatic, pregnant, or undergoing an invasive urologic procedure.    [facility] SCREEN - Swab, Axilla Right, Axilla Left and Groin [[id number]] Collected: 03/27/24 0859    Specimen: Swab from Axilla Right, Axilla Left and Groin Updated: 03/27/24 0916    POC Glucose Once [[id number]]  (Normal) Collected: 03/27/24 0742    Specimen: Blood Updated: 03/27/24 0744     Glucose 123 mg/dL     Basic Metabolic Panel [[id number]]  (Abnormal) Collected: 03/27/24 0649    Specimen: Blood from Arm, Right Updated: 03/27/24 0725     Glucose 124 mg/dL      BUN 80 mg/dL      Creatinine 2.20 mg/dL      Sodium 149 mmol/L      Potassium 3.8 mmol/L      Chloride 113 mmol/L      CO2 25.0 mmol/L      Calcium 8.4 mg/dL      BUN/Creatinine Ratio 36.4     Anion Gap 11.0 mmol/L      eGFR 29.5 mL/min/1.73     Narrative:      GFR Normal >60  Chronic Kidney Disease <60  Kidney Failure <15    The GFR formula is only valid for adults with stable renal function between ages 18 and 70.    Magnesium [[id number]]  (Abnormal) Collected: 03/27/24 0649    Specimen: Blood from Arm, Right Updated: 03/27/24 0725     Magnesium 2.9 mg/dL     Phosphorus [[id number]]  (Abnormal) Collected: 03/27/24 0649    Specimen: Blood from Arm, Right Updated: 03/27/24 0720     Phosphorus 5.2 mg/dL     Manual Differential [[id number]]  (Abnormal) Collected: 03/27/24 0431    Specimen: Blood from Arm, Right Updated: 03/27/24 0707     Neutrophil % 84.8 %      Lymphocyte % 9.1 %      Monocyte %  6.1 %      Neutrophils Absolute 9.24 10*3/mm3      Lymphocytes Absolute 0.99 10*3/mm3      Monocytes Absolute 0.66 10*3/mm3      RBC Morphology Normal     WBC Morphology Normal     Platelet Morphology Normal    CBC & Differential [[id number]]  (Abnormal) Collected: 03/27/24 0431    Specimen: Blood from Arm, Right Updated: 03/27/24 0703    Narrative:      The following orders were created for panel order CBC & Differential.  Procedure                               Abnormality         Status                     ---------                               -----------         ------                     CBC Auto Differential[[id number]]        Abnormal            Final result                 Please view results for these tests on the individual orders.    CBC Auto Differential [[id number]]  (Abnormal) Collected: 03/27/24 0431    Specimen: Blood from Arm, Right Updated: 03/27/24 0703     WBC 10.90 10*3/mm3      RBC 2.55 10*6/mm3      Hemoglobin 7.1 g/dL      Hematocrit 22.3 %      MCV 87.5 fL      MCH 27.8 pg      MCHC 31.8 g/dL      RDW 13.2 %      RDW-SD 41.5 fl      MPV 8.9 fL      Platelets 346 10*3/mm3     POC Glucose Once [[id number]]  (Normal) Collected: 03/27/24 0648    Specimen: Blood Updated: 03/27/24 0654     Glucose 120 mg/dL     Blood Culture - Blood, Arm, Left [[id number]]  (Normal) Collected: 03/25/24 2010    Specimen: Blood from Arm, Left Updated: 03/26/24 2030     Blood Culture No growth at 24 hours    Narrative:      Less than seven (7) mL's of blood was collected.  Insufficient quantity may yield false negative results.    Blood Culture - Blood, Arm, Right [[id number]]  (Normal) Collected: 03/25/24 1956    Specimen: Blood from Arm, Right Updated: 03/26/24 2015     Blood Culture No growth at 24 hours    POC Glucose Once [[id number]]  (Normal) Collected: 03/26/24 1937    Specimen: Blood Updated: 03/26/24 1938     Glucose 89 mg/dL     Magnesium [[id number]]  (Abnormal) Collected: 03/26/24 1152    Specimen: Blood  Updated: 03/26/24 1238     Magnesium 3.0 mg/dL     Phosphorus [[id number]]  (Abnormal) Collected: 03/26/24 1152    Specimen: Blood Updated: 03/26/24 1238     Phosphorus 5.6 mg/dL     POC Glucose Once [[id number]]  (Normal) Collected: 03/26/24 1145    Specimen: Blood Updated: 03/26/24 1147     Glucose 87 mg/dL     Basic Metabolic Panel [[id number]]  (Abnormal) Collected: 03/26/24 0406    Specimen: Blood Updated: 03/26/24 0437     Glucose 104 mg/dL      BUN 78 mg/dL      Creatinine 2.30 mg/dL      Sodium 138 mmol/L      Potassium 4.8 mmol/L      Comment: Slight hemolysis detected by analyzer. Result may be falsely elevated.        Chloride 106 mmol/L      CO2 20.0 mmol/L      Calcium 8.9 mg/dL      BUN/Creatinine Ratio 33.9     Anion Gap 12.0 mmol/L      eGFR 28.0 mL/min/1.73     Narrative:      GFR Normal >60  Chronic Kidney Disease <60  Kidney Failure <15    The GFR formula is only valid for adults with stable renal function between ages 18 and 70.    CBC (No Diff) [[id number]]  (Abnormal) Collected: 03/26/24 0406    Specimen: Blood Updated: 03/26/24 0419     WBC 12.83 10*3/mm3      RBC 4.79 10*6/mm3      Hemoglobin 13.0 g/dL      Hematocrit 41.0 %      MCV 85.6 fL      MCH 27.1 pg      MCHC 31.7 g/dL      RDW 12.9 %      RDW-SD 39.8 fl      MPV 9.3 fL      Platelets 422 10*3/mm3     STAT Lactic Acid, Reflex [[id number]]  (Normal) Collected: 03/25/24 2303    Specimen: Blood from Arm, Left Updated: 03/25/24 2330     Lactate 1.8 mmol/L     High Sensitivity Troponin T 2Hr [[id number]]  (Abnormal) Collected: 03/25/24 2010    Specimen: Blood from Arm, Left Updated: 03/25/24 2040     HS Troponin T 45 ng/L      Troponin T Delta -6 ng/L     Narrative:      High Sensitive Troponin T Reference Range:  <14.0 ng/L- Negative Female for AMI  <22.0 ng/L- Negative Male for AMI  >=14 - Abnormal Female indicating possible myocardial injury.  >=22 - Abnormal Male indicating possible myocardial injury.   Clinicians would have to utilize  clinical acumen, EKG, Troponin, and serial changes to determine if it is an Acute Myocardial Infarction or myocardial injury due to an underlying chronic condition.         STAT Lactic Acid, Reflex [[id number]]  (Abnormal) Collected: 03/25/24 1956    Specimen: Blood from Arm, Right Updated: 03/25/24 2035     Lactate 2.1 mmol/L     Urinalysis With Microscopic If Indicated (No Culture) - Straight Cath [[id number]]  (Abnormal) Collected: 03/25/24 1918    Specimen: Urine from Straight Cath Updated: 03/25/24 2015     Color, UA Yellow     Appearance, UA Turbid     pH, UA 7.0     Specific Gravity, UA 1.011     Glucose, UA Negative     Ketones, UA Negative     Bilirubin, UA Negative     Blood, UA Large (3+)     Protein, UA >=300 mg/dL (3+)     Leuk Esterase, UA Large (3+)     Nitrite, UA Negative     Urobilinogen, UA 0.2 E.U./dL    Urinalysis, Microscopic Only - Straight Cath [[id number]]  (Abnormal) Collected: 03/25/24 1918    Specimen: Urine from Straight Cath Updated: 03/25/24 2015     RBC, UA 3-5 /HPF      WBC, UA Too Numerous to Count /HPF      Bacteria, UA 4+ /HPF      Squamous Epithelial Cells, UA None Seen /HPF      Hyaline Casts, UA None Seen /LPF      Methodology Manual Light Microscopy    Respiratory Panel PCR w/COVID-19(SARS-CoV-2) [facility]ON/TRAM/KELLE/PAD/COR/JOSEPH In-House, NP Swab in UTM/VTM, 2 HR TAT - Swab, Nasopharynx [[id number]]  (Normal) Collected: 03/25/24 1722    Specimen: Swab from Nasopharynx Updated: 03/25/24 1927     ADENOVIRUS, PCR Not Detected     Coronavirus 229E Not Detected     Coronavirus HKU1 Not Detected     Coronavirus NL63 Not Detected     Coronavirus OC43 Not Detected     COVID19 Not Detected     Human Metapneumovirus Not Detected     Human Rhinovirus/Enterovirus Not Detected     Influenza A PCR Not Detected     Influenza B PCR Not Detected     Parainfluenza Virus 1 Not Detected     Parainfluenza Virus 2 Not Detected     Parainfluenza Virus 3 Not Detected     Parainfluenza Virus 4 Not Detected      RSV, PCR Not Detected     Bordetella pertussis pcr Not Detected     Bordetella parapertussis PCR Not Detected     Chlamydophila pneumoniae PCR Not Detected     Mycoplasma pneumo by PCR Not Detected    Narrative:      In the setting of a positive respiratory panel with a viral infection PLUS a negative procalcitonin without other underlying concern for bacterial infection, consider observing off antibiotics or discontinuation of antibiotics and continue supportive care. If the respiratory panel is positive for atypical bacterial infection (Bordetella pertussis, Chlamydophila pneumoniae, or Mycoplasma pneumoniae), consider antibiotic de-escalation to target atypical bacterial infection.    [place] Draw [[id number]] Collected: 03/25/24 1723    Specimen: Blood Updated: 03/25/24 1832    Narrative:      The following orders were created for panel order [place] Draw.  Procedure                               Abnormality         Status                     ---------                               -----------         ------                     Green Top (Gel)[[id number]]                                  Final result               Lavender Top[[id number]]                                     Final result               Gold Top - SST[[id number]]                                   Final result               Light Blue Top[[id number]]                                   Final result                 Please view results for these tests on the individual orders.    Green Top (Gel) [[id number]] Collected: 03/25/24 1723    Specimen: Blood Updated: 03/25/24 1832     Extra Tube Hold for add-ons.     Comment: Auto resulted.       Lavender Top [[id number]] Collected: 03/25/24 1723    Specimen: Blood Updated: 03/25/24 1832     Extra Tube hold for add-on     Comment: Auto resulted       Gold Top - SST [[id number]] Collected: 03/25/24 1723    Specimen: Blood Updated: 03/25/24 1832     Extra Tube Hold for add-ons.     Comment: Auto resulted.       Light  Blue Top [[id number]] Collected: 03/25/24 1723    Specimen: Blood Updated: 03/25/24 1832     Extra Tube Hold for add-ons.     Comment: Auto resulted       Lactic Acid, Plasma [[id number]]  (Abnormal) Collected: 03/25/24 1723    Specimen: Blood Updated: 03/25/24 1827     Lactate 2.4 mmol/L     BNP [[id number]]  (Normal) Collected: 03/25/24 1723    Specimen: Blood Updated: 03/25/24 1826     proBNP 783.0 pg/mL     Narrative:      This assay is used as an aid in the diagnosis of individuals suspected of having heart failure. It can be used as an aid in the diagnosis of acute decompensated heart failure (ADHF) in patients presenting with signs and symptoms of ADHF to the emergency department (ED). In addition, NT-proBNP of <300 pg/mL indicates ADHF is not likely.    Age Range Result Interpretation  NT-proBNP Concentration (pg/mL:      <50             Positive            >450                   Gray                 300-450                    Negative             <300    50-75           Positive            >900                  Gray                300-900                  Negative            <300      >75             Positive            >1800                  Gray                300-1800                  Negative            <300    High Sensitivity Troponin T [[id number]]  (Abnormal) Collected: 03/25/24 1723    Specimen: Blood Updated: 03/25/24 1826     HS Troponin T 51 ng/L     Narrative:      High Sensitive Troponin T Reference Range:  <14.0 ng/L- Negative Female for AMI  <22.0 ng/L- Negative Male for AMI  >=14 - Abnormal Female indicating possible myocardial injury.  >=22 - Abnormal Male indicating possible myocardial injury.   Clinicians would have to utilize clinical acumen, EKG, Troponin, and serial changes to determine if it is an Acute Myocardial Infarction or myocardial injury due to an underlying chronic condition.         Procalcitonin [[id number]]  (Abnormal) Collected: 03/25/24 1723    Specimen: Blood Updated:  "03/25/24 1826     Procalcitonin 3.36 ng/mL     Narrative:      As a Marker for Sepsis (Non-Neonates):    1. <0.5 ng/mL represents a low risk of severe sepsis and/or septic shock.  2. >2 ng/mL represents a high risk of severe sepsis and/or septic shock.    As a Marker for Lower Respiratory Tract Infections that require antibiotic therapy:    PCT on Admission    Antibiotic Therapy       6-12 Hrs later    >0.5                Strongly Recommended  >0.25 - <0.5        Recommended   0.1 - 0.25          Discouraged              Remeasure/reassess PCT  <0.1                Strongly Discouraged     Remeasure/reassess PCT    As 28 day mortality risk marker: \"Change in Procalcitonin Result\" (>80% or <=80%) if Day 0 (or Day 1) and Day 4 values are available. Refer to [email or web address]    Change in PCT <=80%  A decrease of PCT levels below or equal to 80% defines a positive change in PCT test result representing a higher risk for 28-day all-cause mortality of patients diagnosed with severe sepsis for septic shock.    Change in PCT >80%  A decrease of PCT levels of more than 80% defines a negative change in PCT result representing a lower risk for 28-day all-cause mortality of patients diagnosed with severe sepsis or septic shock.       TSH [[id number]]  (Normal) Collected: 03/25/24 1723    Specimen: Blood Updated: 03/25/24 1826     TSH 2.190 uIU/mL     Comprehensive Metabolic Panel [[id number]]  (Abnormal) Collected: 03/25/24 1723    Specimen: Blood Updated: 03/25/24 1823     Glucose 125 mg/dL      BUN 82 mg/dL      Creatinine 2.50 mg/dL      Sodium 140 mmol/L      Potassium 4.9 mmol/L      Chloride 101 mmol/L      CO2 24.0 mmol/L      Calcium 9.7 mg/dL      Total Protein 9.5 g/dL      Albumin 3.0 g/dL      ALT (SGPT) 15 U/L      AST (SGOT) 16 U/L      Alkaline Phosphatase 112 U/L      Total Bilirubin 0.6 mg/dL      Globulin 6.5 gm/dL      A/G Ratio 0.5 g/dL      BUN/Creatinine Ratio 32.8     Anion Gap 15.0 " mmol/L      eGFR 25.3 mL/min/1.73     Narrative:      GFR Normal >60  Chronic Kidney Disease <60  Kidney Failure <15    The GFR formula is only valid for adults with stable renal function between ages 18 and 70.    Lipase [[id number]]  (Abnormal) Collected: 03/25/24 1723    Specimen: Blood Updated: 03/25/24 1823     Lipase 62 U/L     CK [[id number]]  (Normal) Collected: 03/25/24 1723    Specimen: Blood Updated: 03/25/24 1823     Creatine Kinase 29 U/L     Magnesium [[id number]]  (Abnormal) Collected: 03/25/24 1723    Specimen: Blood Updated: 03/25/24 1823     Magnesium 2.6 mg/dL     Manual Differential [[id number]]  (Abnormal) Collected: 03/25/24 1723    Specimen: Blood Updated: 03/25/24 1820     Neutrophil % 73.5 %      Lymphocyte % 14.3 %      Monocyte % 12.2 %      Neutrophils Absolute 8.71 10*3/mm3      Lymphocytes Absolute 1.69 10*3/mm3      Monocytes Absolute 1.45 10*3/mm3      RBC Morphology Normal     WBC Morphology Normal     Platelet Morphology Normal    CBC & Differential [[id number]]  (Abnormal) Collected: 03/25/24 1723    Specimen: Blood Updated: 03/25/24 1820    Narrative:      The following orders were created for panel order CBC & Differential.  Procedure                               Abnormality         Status                     ---------                               -----------         ------                     CBC Auto Differential[[id number]]        Abnormal            Final result                 Please view results for these tests on the individual orders.    CBC Auto Differential [[id number]]  (Abnormal) Collected: 03/25/24 1723    Specimen: Blood Updated: 03/25/24 1820     WBC 11.85 10*3/mm3      RBC 4.79 10*6/mm3      Hemoglobin 13.2 g/dL      Hematocrit 41.3 %      MCV 86.2 fL      MCH 27.6 pg      MCHC 32.0 g/dL      RDW 13.1 %      RDW-SD 41.3 fl      MPV 9.4 fL      Platelets 471 10*3/mm3           Imaging Results (All)       Procedure Component Value Units Date/Time    CT Head Without  Contrast [[id number]] Collected: 03/25/24 1835     Updated: 03/27/24 1241    Narrative:      CT OF THE BRAIN WITHOUT CONTRAST 03/25/2024     HISTORY: Mental status change.     Axial images were obtained through the brain without intravenous  contrast.     There is moderate diffuse atrophy. There is decreased attenuation of the  periventricular white matter bilaterally consistent with small vessel  white matter ischemic disease. There is no evidence of acute infarction,  hemorrhage, midline shift or mass effect.     No bony abnormalities are seen.       Impression:      No acute process identified.     Radiation dose reduction techniques were utilized, including automated  exposure control and exposure modulation based on body size.        This report was finalized on 3/27/2024 12:38 PM by Dr. Dario Dumont M.D on Workstation: [id number]       CT Abdomen Pelvis Without Contrast [[id number]] Collected: 03/25/24 1957     Updated: 03/25/24 2001    Narrative:      CT OF THE ABDOMEN AND PELVIS WITHOUT CONTRAST 03/25/2024     HISTORY: Abdominal pain.     Spiral images were obtained from the lung bases to the symphysis pubis.  No intravenous or oral contrast was given.     There is streak artifact from patient's arms. The liver, gallbladder and  spleen appear unremarkable. Pancreas appears somewhat atrophic. The  adrenals appear unremarkable. Kidneys are obscured by streak artifact  but there may be some mild hydronephrosis bilaterally, greater on the  right than on the left and there may be at least 1 right renal cyst such  as on image 46.     There is moderate gaseous distention of the colon with moderate amount  of stool in the right colon. No small bowel dilatation is seen. Urinary  bladder is moderately distended. Multiple fluid collections are seen  adjacent to the superior aspect of the urinary bladder and these may  represent multiple urinary bladder diverticula but are somewhat more  numerous than on the previous  study of 01/23/2024. There is wall  thickening of the urinary bladder as well. Left hip prosthesis produces  streak artifact and obscures the pelvis as well.       Impression:      1. Some of the images are obscured by streak artifact from patient's  arms and left hip prosthesis.  2. Kidneys are somewhat obscured but there may be mild-to-moderate  bilateral hydronephrosis and at least 1 right renal cyst.  3. Moderate gaseous distention of the colon with moderate amount of  stool in the right colon and rectum.  4. 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 These appear more extensive than on the 01/23/2024  study. Please correlate for urinary bladder outlet obstruction/partial  obstruction.     Radiation dose reduction techniques were utilized, including automated  exposure control and exposure modulation based on body size.          XR Chest 1 View [[id number]] Collected: 03/25/24 1756     Updated: 03/25/24 1809    Narrative:      XR CHEST 1 VW-3/25/2024     HISTORY: Shortness of breath.     Heart size is within normal limits. Lungs appear free of acute  infiltrates. There is mild vascular congestion. There is some aortic  calcification.       Impression:      1. No acute process except for mild vascular congestion.        This report was finalized on 3/25/2024 6:06 PM by Dr. Dario Dumont M.D on Workstation: [id number]             ECG/EMG Results (all)       Procedure Component Value Units Date/Time    Telemetry Scan [[id number]] Resulted: 03/25/24     Updated: 03/26/24 0914    ECG 12 Lead Altered Mental Status [[id number]] Collected: 03/25/24 1732     Updated: 03/26/24 1010     QT Interval 395 ms      QTC Interval 462 ms     Narrative:      HEART RATE= 82  bpm  RR Interval= 732  ms  ME Interval= 125  ms  P Horizontal Axis= 5  deg  P Front Axis= 52  deg  QRSD Interval= 107  ms  QT Interval= 395  ms  QTcB= 462  ms  QRS Axis= 24  deg  T Wave Axis= 47  deg  - ABNORMAL  ECG -  Sinus rhythm  Abnormal R-wave progression, early transition  Left ventricular hypertrophy  No Prior Tracing for Comparison  Electronically Signed By: Altagracia Funes ([facility]) 26-Mar-2024 10:10:19  Date and Time of Study: 2024 17:32:20    Telemetry Scan [[id number]] Resulted: 24     Updated: 24 1118    Telemetry Scan [[id number]] Resulted: 24     Updated: 24 1213    Telemetry Scan [[id number]] Resulted: 24     Updated: 24 1406    Telemetry Scan [[id number]] Resulted: 24     Updated: 24 1519    Telemetry Scan [[id number]] Resulted: 24     Updated: 24 1116    Telemetry Scan [[id number]] Resulted: 24     Updated: 24 1218    Telemetry Scan [[id number]] Resulted: 24     Updated: 24 1402        Physician Progress Notes (all)        Nomi Osman MD at 24 1327              Name: Anthony Gallegos ADMIT: 3/25/2024   : 1945  PCP: Provider, No Known    MRN: [id number] LOS: 2 days   AGE/SEX: 79 y.o. male  ROOM: [facility]     Subjective   Subjective   Patient seen this morning, no acute events overnight.  Refused core rack placement yesterday, remains strict n.p.o.  Condition unchanged, oriented to name only, not to time or place.    Review of Systems  As above  Objective   Objective   Vital Signs  Temp:  [97.3 °F (36.3 °C)-98.6 °F (37 °C)] 97.5 °F (36.4 °C)  Heart Rate:  [70-86] 70  Resp:  [16-18] 18  BP: (109-128)/(51-71) 112/60  SpO2:  [90 %-100 %] 90 %  on   ;   Device (Oxygen Therapy): room air  Body mass index is 21.05 kg/m².  Physical Exam    General: Laying in bed, oriented to name only, chronically ill appearing,  HEENT: Normocephalic, atraumatic  CV: Regular rate and rhythm, no murmurs rubs   Lungs: CTA, no wheezing es  Abdomen: Soft, mildly distended, suprapubic tenderness to palpation  Extremities: No significant peripheral edema , no cyanosis, generalized weakness    Results Review     I reviewed the patient's new  clinical results.  Results from last 7 days   Lab Units 03/27/24  1015 03/27/24  0431 03/26/24  0406 03/25/24  1723   WBC 10*3/mm3 14.65* 10.90* 12.83* 11.85*   HEMOGLOBIN g/dL 11.8* 7.1* 13.0 13.2   PLATELETS 10*3/mm3 389 346 422 471*     Results from last 7 days   Lab Units 03/27/24  0649 03/26/24  0406 03/25/24  1723   SODIUM mmol/L 149* 138 140   POTASSIUM mmol/L 3.8 4.8 4.9   CHLORIDE mmol/L 113* 106 101   CO2 mmol/L 25.0 20.0* 24.0   BUN mg/dL 80* 78* 82*   CREATININE mg/dL 2.20* 2.30* 2.50*   GLUCOSE mg/dL 124* 104* 125*   Estimated Creatinine Clearance: 28.9 mL/min (A) (by C-G formula based on SCr of 2.2 mg/dL (H)).  Results from last 7 days   Lab Units 03/25/24  1723   ALBUMIN g/dL 3.0*   BILIRUBIN mg/dL 0.6   ALK PHOS U/L 112   AST (SGOT) U/L 16   ALT (SGPT) U/L 15     Results from last 7 days   Lab Units 03/27/24  0649 03/26/24  1152 03/26/24  0406 03/25/24  1723   CALCIUM mg/dL 8.4*  --  8.9 9.7   ALBUMIN g/dL  --   --   --  3.0*   MAGNESIUM mg/dL 2.9* 3.0*  --  2.6*   PHOSPHORUS mg/dL 5.2* 5.6*  --   --      Results from last 7 days   Lab Units 03/25/24  2303 03/25/24  1956 03/25/24  1723   PROCALCITONIN ng/mL  --   --  3.36*   LACTATE mmol/L 1.8 2.1* 2.4*     COVID19   Date Value Ref Range Status   03/25/2024 Not Detected Not Detected - Ref. Range Final     Glucose   Date/Time Value Ref Range Status   03/27/2024 1118 107 70 - 130 mg/dL Final   03/27/2024 0742 123 70 - 130 mg/dL Final   03/27/2024 0648 120 70 - 130 mg/dL Final   03/26/2024 1937 89 70 - 130 mg/dL Final   03/26/2024 1145 87 70 - 130 mg/dL Final           CT Head Without Contrast  Narrative: CT OF THE BRAIN WITHOUT CONTRAST 03/25/2024     HISTORY: Mental status change.     Axial images were obtained through the brain without intravenous  contrast.     There is moderate diffuse atrophy. There is decreased attenuation of the  periventricular white matter bilaterally consistent with small vessel  white matter ischemic disease. There is no  evidence of acute infarction,  hemorrhage, midline shift or mass effect.     No bony abnormalities are seen.     Impression: No acute process identified.     Radiation dose reduction techniques were utilized, including automated  exposure control and exposure modulation based on body size.        This report was finalized on 3/27/2024 12:38 PM by Dr. Dario Dumont M.D on Workstation: [id number]       Scheduled Medications  bisacodyl, 10 mg, Rectal, Once  finasteride, 5 mg, Oral, Daily  heparin (porcine), 5,000 Units, Subcutaneous, Q8H  Menthol-Zinc Oxide, 1 Application, Topical, Q12H  pantoprazole, 40 mg, Oral, Q AM  piperacillin-tazobactam, 3.375 g, Intravenous, Q8H  senna-docusate sodium, 2 tablet, Oral, BID   And  polyethylene glycol, 17 g, Oral, BID  tamsulosin, 0.4 mg, Oral, Nightly    Infusions  dextrose 5 % and sodium chloride 0.9 %, 100 mL/hr, Last Rate: 100 mL/hr (03/26/24 2013)    Diet  NPO Diet NPO Type: Strict NPO    I have personally reviewed     [x]  Laboratory   [x]  Microbiology   [x]  Radiology   []  EKG/Telemetry  []  Cardiology/Vascular   []  Pathology    []  Records      Assessment/Plan     Active Hospital Problems    Diagnosis  POA    **UTI (urinary tract infection) [N39.0]  Yes    Encephalopathy [G93.40]  Unknown    MARTITA (acute kidney injury) [N17.9]  Yes    Severe malnutrition [E43]  Yes    Hypertension [I10]  Yes      Resolved Hospital Problems   No resolved problems to display.       Patient is a 78 y.o. male with a history of HTN, BPH pyelonephritis/UTI, who presented to [facility] from facility for failure to thrive, not eating or drinking much, weight loss, decreased mobility.    UTI  -Urine culture and blood culture pending  -Recent urine culture was positive for Klebsiella, sensitive to Zosyn  -Continue IV Zosyn, follow-up culture results  -Preliminary urine culture growing more than 100,000 gram-negative bacilli  -WBC increased today, afebrile.    MARTITA/bilateral  hydronephrosis  -Creatinine 2.5 on admission, up from 0.66 on 2024  -CT scan showed mild-to-moderate bilateral hydronephrosis   -Status post Mariscal catheter placement 2024 per urology  -Continue IV fluids,, monitor daily BMP  -Hold outpatient tenoretic    -Creatinine stable around 2.2,  -Nephrology evaluated  -Sodium increased up to 149 today, will defer fluid adjustment to neurology      Encephalopathy  -Patient oriented to name only, not to time or place  -No history of dementia per daughter, at baseline oriented x 3  -CT head with no acute findings  -Likely secondary to UTI as above  -Continue  treatment for UTI, monitor neurological status  -Remains oriented to name only    Failure to thrive/malnutrition/decreased p.o. intake  -SLP evaluated, recommend strict n.p.o.  -Core track and tube feeds ordered however patient refused  -Palliative care consult pending for goals of care discussion  -Nutrition following  -Speech therapy to reevaluate    Constipation  -CT scan showed moderate gaseous distention of the colon with moderate amount of  stool in the right colon and rectum.  -Ordered suppository, initiated on bowel regimen however patient remains trach n.p.o. per speech therapy recommendation  -Patient refused suppository      Hypertension  -BP acutely stable  -Holding outpatient Tenoretic      Subcu heparin  for DVT prophylaxis.  Full code.  Discussed with RN  Discussed with nephrology  Anticipate discharge TBD      Copied text in this note has been reviewed and is accurate as of 24.         Dictated utilizing Dragon dictation        Nomi Osman MD  [facility]  24  13:27 EDT        Electronically signed by Nomi Osman MD at 24 1334       Nomi Osman MD at 24 0727              Name: Anthony Gallegos ADMIT: 3/25/2024   : 1945  PCP: Provider, No Known    MRN: [id number] LOS: 1 days   AGE/SEX: 79 y.o. male  [facility]      Subjective   Subjective   Patient laying in bed, confused, oriented to name only, does follow commands.  Does reply yes or no to questions.  Patient denies abdominal pain when asked, no nausea no vomiting.  No fevers or chills, chest pain or palpitations.    Review of Systems  As above  Objective   Objective   Vital Signs  Temp:  [97.9 °F (36.6 °C)-98.5 °F (36.9 °C)] 98.5 °F (36.9 °C)  Heart Rate:  [78-85] 78  Resp:  [14-20] 16  BP: ()/(54-79) 126/74  SpO2:  [98 %-100 %] 99 %  on   ;   Device (Oxygen Therapy): room air  Body mass index is 19.05 kg/m².  Physical Exam    General: Laying in bed, oriented to name only, chronically ill appearing,  HEENT: Normocephalic, atraumatic  CV: Regular rate and rhythm, no murmurs rubs   Lungs: CTA, no wheezing es  Abdomen: Soft, mildly distended, nontender  Extremities: No significant peripheral edema , no cyanosis, generalized weakness    Results Review     I reviewed the patient's new clinical results.  Results from last 7 days   Lab Units 03/26/24  0406 03/25/24  1723   WBC 10*3/mm3 12.83* 11.85*   HEMOGLOBIN g/dL 13.0 13.2   PLATELETS 10*3/mm3 422 471*     Results from last 7 days   Lab Units 03/26/24  0406 03/25/24  1723   SODIUM mmol/L 138 140   POTASSIUM mmol/L 4.8 4.9   CHLORIDE mmol/L 106 101   CO2 mmol/L 20.0* 24.0   BUN mg/dL 78* 82*   CREATININE mg/dL 2.30* 2.50*   GLUCOSE mg/dL 104* 125*   Estimated Creatinine Clearance: 25 mL/min (A) (by C-G formula based on SCr of 2.3 mg/dL (H)).  Results from last 7 days   Lab Units 03/25/24  1723   ALBUMIN g/dL 3.0*   BILIRUBIN mg/dL 0.6   ALK PHOS U/L 112   AST (SGOT) U/L 16   ALT (SGPT) U/L 15     Results from last 7 days   Lab Units 03/26/24  0406 03/25/24  1723   CALCIUM mg/dL 8.9 9.7   ALBUMIN g/dL  --  3.0*   MAGNESIUM mg/dL  --  2.6*     Results from last 7 days   Lab Units 03/25/24  2303 03/25/24  1956 03/25/24  1723   PROCALCITONIN ng/mL  --   --  3.36*   LACTATE mmol/L 1.8 2.1* 2.4*     COVID19   Date Value Ref  "Range Status   03/25/2024 Not Detected Not Detected - Ref. Range Final     No results found for: \"HGBA1C\", \"POCGLU\"        CT Abdomen Pelvis Without Contrast  Narrative: CT OF THE ABDOMEN AND PELVIS WITHOUT CONTRAST 03/25/2024     HISTORY: Abdominal pain.     Spiral images were obtained from the lung bases to the symphysis pubis.  No intravenous or oral contrast was given.     There is streak artifact from patient's arms. The liver, gallbladder and  spleen appear unremarkable. Pancreas appears somewhat atrophic. The  adrenals appear unremarkable. Kidneys are obscured by streak artifact  but there may be some mild hydronephrosis bilaterally, greater on the  right than on the left and there may be at least 1 right renal cyst such  as on image 46.     There is moderate gaseous distention of the colon with moderate amount  of stool in the right colon. No small bowel dilatation is seen. Urinary  bladder is moderately distended. Multiple fluid collections are seen  adjacent to the superior aspect of the urinary bladder and these may  represent multiple urinary bladder diverticula but are somewhat more  numerous than on the previous study of 01/23/2024. There is wall  thickening of the urinary bladder as well. Left hip prosthesis produces  streak artifact and obscures the pelvis as well.     Impression: 1. Some of the images are obscured by streak artifact from patient's  arms and left hip prosthesis.  2. Kidneys are somewhat obscured but there may be mild-to-moderate  bilateral hydronephrosis and at least 1 right renal cyst.  3. Moderate gaseous distention of the colon with moderate amount of  stool in the right colon and rectum.  4. 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 These appear more extensive than on the 01/23/2024  study. Please correlate for urinary bladder outlet obstruction/partial  obstruction.     Radiation dose reduction techniques were utilized, including " automated  exposure control and exposure modulation based on body size.        CT Head Without Contrast  Narrative: CT OF THE BRAIN WITHOUT CONTRAST 03/25/2024     HISTORY: Mental status change.     Axial images were obtained through the brain without intravenous  contrast.     There is moderate diffuse atrophy. There is decreased attenuation of the  periventricular white matter bilaterally consistent with small vessel  white matter ischemic disease. There is no evidence of acute infarction,  hemorrhage, midline shift or mass effect.     No bony abnormalities are seen.     Impression: No acute process identified.     Radiation dose reduction techniques were utilized, including automated  exposure control and exposure modulation based on body size.        XR Chest 1 View  Narrative: XR CHEST 1 VW-3/25/2024     HISTORY: Shortness of breath.     Heart size is within normal limits. Lungs appear free of acute  infiltrates. There is mild vascular congestion. There is some aortic  calcification.     Impression: 1. No acute process except for mild vascular congestion.        This report was finalized on 3/25/2024 6:06 PM by Dr. Dario Dumont M.D on Workstation: [id number]       Scheduled Medications  bisacodyl, 10 mg, Rectal, Once  finasteride, 5 mg, Oral, Daily  pantoprazole, 40 mg, Oral, Q AM  piperacillin-tazobactam, 3.375 g, Intravenous, Q8H  senna-docusate sodium, 2 tablet, Oral, BID   And  polyethylene glycol, 17 g, Oral, BID  tamsulosin, 0.4 mg, Oral, Nightly    Infusions  sodium chloride, 125 mL/hr, Last Rate: 125 mL/hr (03/26/24 0000)    Diet  NPO Diet NPO Type: Strict NPO    I have personally reviewed     [x]  Laboratory   [x]  Microbiology   [x]  Radiology   []  EKG/Telemetry  []  Cardiology/Vascular   []  Pathology    []  Records      Assessment/Plan     Active Hospital Problems    Diagnosis  POA    **UTI (urinary tract infection) [N39.0]  Yes    Encephalopathy [G93.40]  Unknown    MARTITA (acute kidney injury)  [N17.9]  Yes    Severe malnutrition [E43]  Yes    Hypertension [I10]  Yes      Resolved Hospital Problems   No resolved problems to display.       Patient is a 78 y.o. male with a history of HTN, BPH pyelonephritis/UTI, who presented to [facility] from facility for failure to thrive, not eating or drinking much, weight loss, decreased mobility.    UTI  -Urine culture and blood culture pending  -Recent urine culture was positive for Klebsiella, sensitive to Zosyn  -Continue IV Zosyn, follow-up culture results    MARTITA/bilateral hydronephrosis  -Creatinine 2.5 on admission, up from 0.66 on 01/29/2024  -CT scan showed mild-to-moderate bilateral hydronephrosis   --Creatinine improving  -Continue IV fluids,, monitor daily BMP  -Hold outpatient tenoretic  for blood pressure  -Nephrology neurology consult pending    Encephalopathy  -Patient oriented to name only, not to time or place  -No history of dementia per daughter, at baseline oriented x 3  -CT head with no acute findings  -Likely secondary to UTI as above  -Continue  treatment for UTI, monitor neurological status    Failure to thrive/malnutrition/decreased p.o. intake  -SLP evaluated, recommend strict n.p.o.  -Core track and tube feeds ordered  -Palliative care consult for goals of care discussion\      Constipation  -CT scan showed moderate gaseous distention of the colon with moderate amount of  stool in the right colon and rectum.  -Ordered suppository, initiated on bowel regimen      Hypertension  -BP acutely stable  -Holding outpatient Tenoretic      Subcu heparin  for DVT prophylaxis.  Full code.  Discussed with patient, family, and nursing staff.  Anticipate discharge TBD      Copied text in this note has been reviewed and is accurate as of 03/26/24.         Dictated utilizing Dragon dictation        Nomi Osman MD  [facility]  03/26/24  10:35 EDT        Electronically signed by Nomi Osman MD at 03/26/24  1035          Consult Notes (all)        Emma Corcoran APRN at 03/27/24 1102        Consult Orders    1. Inpatient Palliative Care Team Consult [[id number]] ordered by Nomi Osman MD at 03/26/24 1001                 .            [facility] Palliative Care Services    Palliative Care Initial Consult   Attending Physician: Nomi Osman MD  Referring Provider: Dr Osman    Reason for Referral: assistance with clarification of goals of care  Family/Support: children     Code Status and Medical Interventions:   Ordered at: 03/25/24 1943     Code Status (Patient has no pulse and is not breathing):    CPR (Attempt to Resuscitate)     Medical Interventions (Patient has pulse or is breathing):    Full     Goals of Care: TBD.    HPI:   79 y.o. male with history of ankylosing spondylitis of cervical spine, benign prostate hyperplasia, hydronephrosis. He resided at [facility] prior to admission.   Patient presented to [facility] on 3/25/2024 related to weakness and poor oral intake. Workup in ER, revealed sepsis, acute kidney injury and urinary retention. He was started on IV Zoysn and consult placed for urology. He had indwelling catheter placed. His urine culture is pending. He was evaluated for dysphagia by speech therapy yesterday and they recommended NPO. The palliative care team was consulted for support with goals of care conversation.   Palliative Care Spoke With: patient  Quality of life: fair  Functional Status: wheelchair per daughter  Due to the Palliative Care Topics Discussed: palliative care, goals of care, care options, resuscitation status, and discharge options we will establish an advance care plan.   Advance Care Planning   Advance Care Planning Discussion: see below       Review of Systems   Unable to perform ROS: Mental status change       1- Pain Assessment  Pain Location: abdomen    Past Medical History:   Diagnosis Date    Abscess  of scrotum     MARTITA (acute kidney injury)     Altered mental state     Arthritis     AS (ankylosing spondylitis)     Hypertension     Leukocytosis     Tetrahydrocannabinol (THC) use disorder, mild, abuse 12/20/2023    Uveitis      Past Surgical History:   Procedure Laterality Date    COLONOSCOPY      ROTATOR CUFF REPAIR Right     TOTAL HIP ARTHROPLASTY Left 2014     Social History     Socioeconomic History    Marital status:     Number of children: 2   Tobacco Use    Smoking status: Never    Smokeless tobacco: Never   Vaping Use    Vaping status: Never Used   Substance and Sexual Activity    Alcohol use: No    Drug use: No    Sexual activity: Defer       Current Facility-Administered Medications   Medication Dose Route Frequency Provider Last Rate Last Admin    acetaminophen (TYLENOL) tablet 650 mg  650 mg Oral Q4H PRN Casie Reardon MD        sennosides-docusate (PERICOLACE) 8.6-50 MG per tablet 2 tablet  2 tablet Oral BID Nomi Osman MD        And    polyethylene glycol (MIRALAX) packet 17 g  17 g Oral BID Nomi Osman MD        And    bisacodyl (DULCOLAX) EC tablet 5 mg  5 mg Oral Daily PRN Nomi Osman MD        And    bisacodyl (DULCOLAX) suppository 10 mg  10 mg Rectal Daily PRN Nomi Osman MD        bisacodyl (DULCOLAX) suppository 10 mg  10 mg Rectal Once Nomi Osman MD        dextrose (D50W) (25 g/50 mL) IV injection 25 g  25 g Intravenous Q15 Min PRN Nomi Osman MD        dextrose (GLUTOSE) oral gel 15 g  15 g Oral Q15 Min PRN Nomi Osman MD        dextrose 5 % and sodium chloride 0.9 % infusion  100 mL/hr Intravenous Continuous Nomi Osman  mL/hr at 03/26/24 2013 100 mL/hr at 03/26/24 2013    finasteride (PROSCAR) tablet 5 mg  5 mg Oral Daily Casie Reardon MD        glucagon (GLUCAGEN) injection 1 mg  1 mg Subcutaneous Q15 Min PRN Nomi Osman MD        heparin (porcine) 5000 UNIT/ML injection 5,000  Units  5,000 Units Subcutaneous Q8H Nomi Osman MD   5,000 Units at 03/27/24 0555    melatonin tablet 3 mg  3 mg Oral Nightly PRN Casie Reardon MD        Menthol-Zinc Oxide 1 Application  1 Application Topical Q12H Nomi Osman MD   1 Application at 03/27/24 0859    ondansetron ODT (ZOFRAN-ODT) disintegrating tablet 4 mg  4 mg Oral Q6H PRN Casie Reardon MD        Or    ondansetron (ZOFRAN) injection 4 mg  4 mg Intravenous Q6H PRN Casie Reardon MD        pantoprazole (PROTONIX) EC tablet 40 mg  40 mg Oral Q AM Casie Reardon MD        piperacillin-tazobactam (ZOSYN) 3.375 g IVPB in 100 mL NS MBP (CD)  3.375 g Intravenous Q8H StingCasie sandoval MD   3.375 g at 03/27/24 0330    sodium chloride 0.9 % flush 10 mL  10 mL Intravenous PRN Ashleigh Early MD   10 mL at 03/25/24 1719    tamsulosin (FLOMAX) 24 hr capsule 0.4 mg  0.4 mg Oral Nightly StingCasie sandoval MD         dextrose 5 % and sodium chloride 0.9 %, 100 mL/hr, Last Rate: 100 mL/hr (03/26/24 2013)        acetaminophen    senna-docusate sodium **AND** polyethylene glycol **AND** bisacodyl **AND** bisacodyl    dextrose    dextrose    glucagon (human recombinant)    melatonin    ondansetron ODT **OR** ondansetron    [COMPLETED] Insert Peripheral IV **AND** sodium chloride    No Known Allergies  Attest that current medications reviewed  including but not limited to prescriptions, over-the counter, herbals and vitamin/mineral/dietary (nutritional) supplements for name, route of administration, type, dose and frequency and are current using all immediate resources available at time of dictation.      Intake/Output Summary (Last 24 hours) at 3/27/2024 0928  Last data filed at 3/27/2024 0559  Gross per 24 hour   Intake 10 ml   Output 3400 ml   Net -3390 ml       Physical Exam:    Diagnostics: Reviewed  /51 (BP Location: Right arm, Patient Position: Lying)   Pulse 77   Temp 97.5 °F (36.4 °C)  "(Oral)   Resp 18   Ht 190.5 cm (75\")   Wt 76.4 kg (168 lb 6.4 oz)   SpO2 100%   BMI 21.05 kg/m²     Constitutional:       Appearance: Not in distress. Cachectic and frail. Chronically ill-appearing.   Pulmonary:      Effort: Pulmonary effort is normal.   Cardiovascular:      PMI at left midclavicular line.   Edema:     Peripheral edema absent.   Abdominal:      General: Bowel sounds are normal.      Palpations: Abdomen is soft.      Tenderness: There is no abdominal tenderness.   Genitourinary:     Comments: F/c with yellow urine noted   Neurological:      Mental Status: Alert. Disoriented.      Comments: Oriented to self and location '  Unsure of situation and month/year   Psychiatric:         Mood and Affect: Affect is flat.         Speech: Speech normal.         Behavior: Behavior is cooperative.         Thought Content: Thought content normal.         Cognition and Memory: Cognition normal.         Judgment: Judgment normal.       Patient status: Disease state: Controlled with current treatments.  Functional status: Palliative Performance Scale Score: Performance 40% based on the following measures: Ambulation: Mainly in bed, Activity and Evidence of Disease: Unable to do any work, extensive evidence of disease, Self-Care: Mainly assistance required,  Intake: Normal or reduced, LOC: Full, drowsy or confusion   Nutritional status: Albumin 3.0 on 3/25/2024  Body mass index is 21.05 kg/m².   Family support: The patient receives support from his children..  Advance Directives: Advance Directive Status: Patient does not have advance directive   POA/Healthcare surrogate-n/a.       Impression/Problem List:    UTI   Bilateral hydronephrosis  Urinary retention  Failure to thrive  Dysphagia  Hypertension      Deconditioned        Recommendations/Plan:  1. Provide support with goals of care  2. FULL code       2.  Palliative care encounter  The patient is unable to participate in goals of care conversation due to " confusion and unaware of situation. I attempted to call his daughter and unable to leave a voicemail.I also called second listed contact, Nik and no answer and was I able to leave a generic voicemail.  I was able to eventually get in touch with patient daughter, Whit at 1138. She has a fair understanding of the patient conditions, which we reviewed in detail. She reports since December 2023 the patient has progressively declined. He has had multiple hospital admissions, leading to rehab stays and he did most recently return back to [facility] living [facility] where she pays for personal care to assist with bathing, clothing etc. He has been mostly wheelchair bound. His cognition has varied as well since mid January. She reports he hasn't been diagnosed with dementia or cognitive disorder. She reports his appetite has worsened over the last couple months and he favors more sweets than anything else. The patient doesn't have a living will/advance directive. He does have two children , Whit and Nik, however Whit handles his affairs.I did provide her with information regarding palliative care. We also discussed CODE status/medical interventions and she reports her father previously told her he wanted CPR and to be kept alive. We briefly discussed risk and benefit of such intervention with the patient current state of health.  We discussed concerns for dysphagia, ST evaluation (NPO) and recommendations. Also discussed coretrak, PEG, and comfort feeding. The patient is  slightly more awake per bedside RN so will ask ST to reevaluate patient for diet and daughter made aware. She informed me the patient declined coretrak as well.  She was very overwhelmed with conversation and she plans to visit her father today. I will plan to follow up tomorrow with her at 1300 to discuss goals further. I spoke with TERESA Crowder.       Thank you for this consult and allowing us to participate in patient's plan of care.  Palliative Care Team will continue to follow patient.     Time spent:45 minutes spent reviewing medical and medication records, assessing and examining patient, discussing with family, answering questions, providing some guidance about a plan and documentation of care, and coordinating care with other healthcare members, with > 50% time spent face to face.   30 minutes spent on advance care planning.    NOHEMI Edouard  3/27/2024  09:28 EDT               Electronically signed by Emma Corcoran APRN at 24 1354       Shaylee Rodriguez APRN at 24 0813        Consult Orders    1. Inpatient Urology Consult [[id number]] ordered by Nomi Osman MD at 24 0731                        [facility]      Patient Identification:  NAME:  Anthony Gallegos  Age:  79 y.o.   Sex:  male   :  1945   MRN:  [id number]     Chief complaint: Generalized weakness    History of present illness:      Anthony Gallegos is a 79 y.o. male with a history of urinary retention, hydronephrosis, BPH with LUTS and gross hematuria who presented to the ER on 3/25/24 with generalized weakness and decreased oral intake. Patient has been seen previously for urinary retention  and  requiring a prolonged indwelling forde catheter. Patient was discharged with catheter in place which has recently been removed. Pt diagnosed and admitted to the hospital with urinary tract infection. Bladder scan results yielded 349 cc in bladder. Urology was consulted for evaluation and treatment of urinary retention.  Pt has a significant history of retention. Patient currently sees Dr. Jimenez with [facility]. Seen in February with attempted voiding trial in our office but did not pass. A forde catheter was replaced by our [facility]. Currently on Flomax 0.4 mg QD.   Denies hematuria, dysuria. ,fever, flank pain, nausea or vomiting. Nurse taking care of patient reports that during bladder scan patient was  incontinent of urine. Patient refused forde catheter when nurse went to insert.     In hospital:  -AVSS, good UOP  -WBC - 12.83  -Creat - 2.30  -UA - Large leukocytes, negative nitrites, 3-5 RBC, TNTC WBC, 4+ bacteria  -Blood culture- Pending    -CT - Independently reviewed- Mild bilateral hydronephrosis, right renal cyst, bladder wall thickening, several bladder diverticulum seen    Past medical history:  Past Medical History:   Diagnosis Date    Abscess of scrotum     MARTITA (acute kidney injury)     Altered mental state     Arthritis     AS (ankylosing spondylitis)     Hypertension     Leukocytosis     Tetrahydrocannabinol (THC) use disorder, mild, abuse 12/20/2023    Uveitis        Past surgical history:  Past Surgical History:   Procedure Laterality Date    COLONOSCOPY      ROTATOR CUFF REPAIR Right     TOTAL HIP ARTHROPLASTY Left 2014       Allergies:  Patient has no known allergies.    Home medications:  Medications Prior to Admission   Medication Sig Dispense Refill Last Dose    acetaminophen (TYLENOL) 325 MG tablet Take 2 tablets by mouth Every 4 (Four) Hours As Needed for Mild Pain.       atenolol-chlorthalidone (TENORETIC) 50-25 MG per tablet Take 1 tablet by mouth Daily.       finasteride (PROSCAR) 5 MG tablet Take 1 tablet by mouth Daily.       melatonin 3 MG tablet Take 1 tablet by mouth At Night As Needed for Sleep.       methotrexate 2.5 MG tablet Take 1 tablet by mouth 2 (Two) Times a Week. Pt takes every Wednesday and thursday  5     Omega-3 Fatty Acids (OMEGA 3 PO) Take 1 capsule by mouth Daily.       pantoprazole (PROTONIX) 40 MG EC tablet Take 1 tablet by mouth Every Morning.       tamsulosin (FLOMAX) 0.4 MG capsule 24 hr capsule Take 1 capsule by mouth Every Night. 30 capsule 1         Hospital medications:  finasteride, 5 mg, Oral, Daily  pantoprazole, 40 mg, Oral, Q AM  piperacillin-tazobactam, 3.375 g, Intravenous, Q8H  tamsulosin, 0.4 mg, Oral, Nightly      Pharmacy to Dose Zosyn,   sodium  chloride, 125 mL/hr, Last Rate: 125 mL/hr (24 0000)        acetaminophen    senna-docusate sodium **AND** polyethylene glycol **AND** bisacodyl **AND** bisacodyl    melatonin    ondansetron ODT **OR** ondansetron    Pharmacy to Dose Zosyn    [COMPLETED] Insert Peripheral IV **AND** sodium chloride    Family history:  Family History   Problem Relation Age of Onset    Alzheimer's disease Mother        Social history:  Social History     Tobacco Use    Smoking status: Never    Smokeless tobacco: Never   Vaping Use    Vaping status: Never Used   Substance Use Topics    Alcohol use: No    Drug use: No       Review of systems:      12 point negative except as in HPI    Objective:  TMax 24 hours:   Temp (24hrs), Av.1 °F (36.7 °C), Min:97.9 °F (36.6 °C), Max:98.3 °F (36.8 °C)      Vitals Ranges:   Temp:  [97.9 °F (36.6 °C)-98.3 °F (36.8 °C)] 97.9 °F (36.6 °C)  Heart Rate:  [79-85] 81  Resp:  [14-20] 16  BP: ()/(54-79) 120/68    Intake/Output Last 3 shifts:  No intake/output data recorded.     Physical Exam:    General Appearance:    NAD, chronically ill-appearing   Back:     No CVA tenderness   Lungs:     Respirations unlabored, no wheezing   Abdomen:     Distended, firm, non-tender   :    Bladder distended, tender     Results review:   I reviewed the patient's new clinical results.    Data review:  Lab Results (last 24 hours)       Procedure Component Value Units Date/Time    Basic Metabolic Panel [[id number]]  (Abnormal) Collected: 24 0406    Specimen: Blood Updated: 24     Glucose 104 mg/dL      BUN 78 mg/dL      Creatinine 2.30 mg/dL      Sodium 138 mmol/L      Potassium 4.8 mmol/L      Comment: Slight hemolysis detected by analyzer. Result may be falsely elevated.        Chloride 106 mmol/L      CO2 20.0 mmol/L      Calcium 8.9 mg/dL      BUN/Creatinine Ratio 33.9     Anion Gap 12.0 mmol/L      eGFR 28.0 mL/min/1.73     Narrative:      GFR Normal >60  Chronic Kidney Disease <60  Kidney  Failure <15    The GFR formula is only valid for adults with stable renal function between ages 18 and 70.    CBC (No Diff) [[id number]]  (Abnormal) Collected: 03/26/24 0406    Specimen: Blood Updated: 03/26/24 0419     WBC 12.83 10*3/mm3      RBC 4.79 10*6/mm3      Hemoglobin 13.0 g/dL      Hematocrit 41.0 %      MCV 85.6 fL      MCH 27.1 pg      MCHC 31.7 g/dL      RDW 12.9 %      RDW-SD 39.8 fl      MPV 9.3 fL      Platelets 422 10*3/mm3     STAT Lactic Acid, Reflex [[id number]]  (Normal) Collected: 03/25/24 2303    Specimen: Blood from Arm, Left Updated: 03/25/24 2330     Lactate 1.8 mmol/L     High Sensitivity Troponin T 2Hr [[id number]]  (Abnormal) Collected: 03/25/24 2010    Specimen: Blood from Arm, Left Updated: 03/25/24 2040     HS Troponin T 45 ng/L      Troponin T Delta -6 ng/L     Narrative:      High Sensitive Troponin T Reference Range:  <14.0 ng/L- Negative Female for AMI  <22.0 ng/L- Negative Male for AMI  >=14 - Abnormal Female indicating possible myocardial injury.  >=22 - Abnormal Male indicating possible myocardial injury.   Clinicians would have to utilize clinical acumen, EKG, Troponin, and serial changes to determine if it is an Acute Myocardial Infarction or myocardial injury due to an underlying chronic condition.         STAT Lactic Acid, Reflex [[id number]]  (Abnormal) Collected: 03/25/24 1956    Specimen: Blood from Arm, Right Updated: 03/25/24 2035     Lactate 2.1 mmol/L     Blood Culture - Blood, Arm, Left [[id number]] Collected: 03/25/24 2010    Specimen: Blood from Arm, Left Updated: 03/25/24 2015    Urinalysis With Microscopic If Indicated (No Culture) - Straight Cath [[id number]]  (Abnormal) Collected: 03/25/24 1918    Specimen: Urine from Straight Cath Updated: 03/25/24 2015     Color, UA Yellow     Appearance, UA Turbid     pH, UA 7.0     Specific Gravity, UA 1.011     Glucose, UA Negative     Ketones, UA Negative     Bilirubin, UA Negative     Blood, UA Large (3+)     Protein, UA  >=300 mg/dL (3+)     Leuk Esterase, UA Large (3+)     Nitrite, UA Negative     Urobilinogen, UA 0.2 E.U./dL    Urinalysis, Microscopic Only - Straight Cath [[id number]]  (Abnormal) Collected: 03/25/24 1918    Specimen: Urine from Straight Cath Updated: 03/25/24 2015     RBC, UA 3-5 /HPF      WBC, UA Too Numerous to Count /HPF      Bacteria, UA 4+ /HPF      Squamous Epithelial Cells, UA None Seen /HPF      Hyaline Casts, UA None Seen /LPF      Methodology Manual Light Microscopy    Blood Culture - Blood, Arm, Right [[id number]] Collected: 03/25/24 1956    Specimen: Blood from Arm, Right Updated: 03/25/24 2002    Respiratory Panel PCR w/COVID-19(SARS-CoV-2) [facility]DERON/TRAM/KELLE/PAD/COR/JOSEPH In-House, NP Swab in UTM/VTM, 2 HR TAT - Swab, Nasopharynx [[id number]]  (Normal) Collected: 03/25/24 1722    Specimen: Swab from Nasopharynx Updated: 03/25/24 1927     ADENOVIRUS, PCR Not Detected     Coronavirus 229E Not Detected     Coronavirus HKU1 Not Detected     Coronavirus NL63 Not Detected     Coronavirus OC43 Not Detected     COVID19 Not Detected     Human Metapneumovirus Not Detected     Human Rhinovirus/Enterovirus Not Detected     Influenza A PCR Not Detected     Influenza B PCR Not Detected     Parainfluenza Virus 1 Not Detected     Parainfluenza Virus 2 Not Detected     Parainfluenza Virus 3 Not Detected     Parainfluenza Virus 4 Not Detected     RSV, PCR Not Detected     Bordetella pertussis pcr Not Detected     Bordetella parapertussis PCR Not Detected     Chlamydophila pneumoniae PCR Not Detected     Mycoplasma pneumo by PCR Not Detected    Narrative:      In the setting of a positive respiratory panel with a viral infection PLUS a negative procalcitonin without other underlying concern for bacterial infection, consider observing off antibiotics or discontinuation of antibiotics and continue supportive care. If the respiratory panel is positive for atypical bacterial infection (Bordetella pertussis, Chlamydophila pneumoniae,  or Mycoplasma pneumoniae), consider antibiotic de-escalation to target atypical bacterial infection.    [place] Draw [[id number]] Collected: 03/25/24 1723    Specimen: Blood Updated: 03/25/24 1832    Narrative:      The following orders were created for panel order [place] Draw.  Procedure                               Abnormality         Status                     ---------                               -----------         ------                     Green Top (Gel)[[id number]]                                  Final result               Lavender Top[[id number]]                                     Final result               Gold Top - SST[[id number]]                                   Final result               Light Blue Top[[id number]]                                   Final result                 Please view results for these tests on the individual orders.    Green Top (Gel) [[id number]] Collected: 03/25/24 1723    Specimen: Blood Updated: 03/25/24 1832     Extra Tube Hold for add-ons.     Comment: Auto resulted.       Lavender Top [[id number]] Collected: 03/25/24 1723    Specimen: Blood Updated: 03/25/24 1832     Extra Tube hold for add-on     Comment: Auto resulted       Gold Top - SST [[id number]] Collected: 03/25/24 1723    Specimen: Blood Updated: 03/25/24 1832     Extra Tube Hold for add-ons.     Comment: Auto resulted.       Light Blue Top [[id number]] Collected: 03/25/24 1723    Specimen: Blood Updated: 03/25/24 1832     Extra Tube Hold for add-ons.     Comment: Auto resulted       Lactic Acid, Plasma [[id number]]  (Abnormal) Collected: 03/25/24 1723    Specimen: Blood Updated: 03/25/24 1827     Lactate 2.4 mmol/L     BNP [[id number]]  (Normal) Collected: 03/25/24 1723    Specimen: Blood Updated: 03/25/24 1826     proBNP 783.0 pg/mL     Narrative:      This assay is used as an aid in the diagnosis of individuals suspected of having heart failure. It can be used as an aid in the diagnosis of acute decompensated heart  failure (ADHF) in patients presenting with signs and symptoms of ADHF to the emergency department (ED). In addition, NT-proBNP of <300 pg/mL indicates ADHF is not likely.    Age Range Result Interpretation  NT-proBNP Concentration (pg/mL:      <50             Positive            >450                   Gray                 300-450                    Negative             <300    50-75           Positive            >900                  Gray                300-900                  Negative            <300      >75             Positive            >1800                  Gray                300-1800                  Negative            <300    High Sensitivity Troponin T [[id number]]  (Abnormal) Collected: 03/25/24 1723    Specimen: Blood Updated: 03/25/24 1826     HS Troponin T 51 ng/L     Narrative:      High Sensitive Troponin T Reference Range:  <14.0 ng/L- Negative Female for AMI  <22.0 ng/L- Negative Male for AMI  >=14 - Abnormal Female indicating possible myocardial injury.  >=22 - Abnormal Male indicating possible myocardial injury.   Clinicians would have to utilize clinical acumen, EKG, Troponin, and serial changes to determine if it is an Acute Myocardial Infarction or myocardial injury due to an underlying chronic condition.         Procalcitonin [[id number]]  (Abnormal) Collected: 03/25/24 1723    Specimen: Blood Updated: 03/25/24 1826     Procalcitonin 3.36 ng/mL     Narrative:      As a Marker for Sepsis (Non-Neonates):    1. <0.5 ng/mL represents a low risk of severe sepsis and/or septic shock.  2. >2 ng/mL represents a high risk of severe sepsis and/or septic shock.    As a Marker for Lower Respiratory Tract Infections that require antibiotic therapy:    PCT on Admission    Antibiotic Therapy       6-12 Hrs later    >0.5                Strongly Recommended  >0.25 - <0.5        Recommended   0.1 - 0.25          Discouraged              Remeasure/reassess PCT  <0.1                Strongly Discouraged      "Remeasure/reassess PCT    As 28 day mortality risk marker: \"Change in Procalcitonin Result\" (>80% or <=80%) if Day 0 (or Day 1) and Day 4 values are available. Refer to http://www.[email or web address]-pct-calculator.com    Change in PCT <=80%  A decrease of PCT levels below or equal to 80% defines a positive change in PCT test result representing a higher risk for 28-day all-cause mortality of patients diagnosed with severe sepsis for septic shock.    Change in PCT >80%  A decrease of PCT levels of more than 80% defines a negative change in PCT result representing a lower risk for 28-day all-cause mortality of patients diagnosed with severe sepsis or septic shock.       TSH [[id number]]  (Normal) Collected: 03/25/24 1723    Specimen: Blood Updated: 03/25/24 1826     TSH 2.190 uIU/mL     Comprehensive Metabolic Panel [[id number]]  (Abnormal) Collected: 03/25/24 1723    Specimen: Blood Updated: 03/25/24 1823     Glucose 125 mg/dL      BUN 82 mg/dL      Creatinine 2.50 mg/dL      Sodium 140 mmol/L      Potassium 4.9 mmol/L      Chloride 101 mmol/L      CO2 24.0 mmol/L      Calcium 9.7 mg/dL      Total Protein 9.5 g/dL      Albumin 3.0 g/dL      ALT (SGPT) 15 U/L      AST (SGOT) 16 U/L      Alkaline Phosphatase 112 U/L      Total Bilirubin 0.6 mg/dL      Globulin 6.5 gm/dL      A/G Ratio 0.5 g/dL      BUN/Creatinine Ratio 32.8     Anion Gap 15.0 mmol/L      eGFR 25.3 mL/min/1.73     Narrative:      GFR Normal >60  Chronic Kidney Disease <60  Kidney Failure <15    The GFR formula is only valid for adults with stable renal function between ages 18 and 70.    Lipase [[id number]]  (Abnormal) Collected: 03/25/24 1723    Specimen: Blood Updated: 03/25/24 1823     Lipase 62 U/L     CK [[id number]]  (Normal) Collected: 03/25/24 1723    Specimen: Blood Updated: 03/25/24 1823     Creatine Kinase 29 U/L     Magnesium [[id number]]  (Abnormal) Collected: 03/25/24 1723    Specimen: Blood Updated: 03/25/24 1823     Magnesium 2.6 mg/dL     Manual " Differential [[id number]]  (Abnormal) Collected: 03/25/24 1723    Specimen: Blood Updated: 03/25/24 1820     Neutrophil % 73.5 %      Lymphocyte % 14.3 %      Monocyte % 12.2 %      Neutrophils Absolute 8.71 10*3/mm3      Lymphocytes Absolute 1.69 10*3/mm3      Monocytes Absolute 1.45 10*3/mm3      RBC Morphology Normal     WBC Morphology Normal     Platelet Morphology Normal    CBC & Differential [[id number]]  (Abnormal) Collected: 03/25/24 1723    Specimen: Blood Updated: 03/25/24 1820    Narrative:      The following orders were created for panel order CBC & Differential.  Procedure                               Abnormality         Status                     ---------                               -----------         ------                     CBC Auto Differential[[id number]]        Abnormal            Final result                 Please view results for these tests on the individual orders.    CBC Auto Differential [[id number]]  (Abnormal) Collected: 03/25/24 1723    Specimen: Blood Updated: 03/25/24 1820     WBC 11.85 10*3/mm3      RBC 4.79 10*6/mm3      Hemoglobin 13.2 g/dL      Hematocrit 41.3 %      MCV 86.2 fL      MCH 27.6 pg      MCHC 32.0 g/dL      RDW 13.1 %      RDW-SD 41.3 fl      MPV 9.4 fL      Platelets 471 10*3/mm3              Imaging:  Imaging Results (Last 24 Hours)       Procedure Component Value Units Date/Time    CT Abdomen Pelvis Without Contrast [[id number]] Collected: 03/25/24 1957     Updated: 03/25/24 2001    Narrative:      CT OF THE ABDOMEN AND PELVIS WITHOUT CONTRAST 03/25/2024     HISTORY: Abdominal pain.     Spiral images were obtained from the lung bases to the symphysis pubis.  No intravenous or oral contrast was given.     There is streak artifact from patient's arms. The liver, gallbladder and  spleen appear unremarkable. Pancreas appears somewhat atrophic. The  adrenals appear unremarkable. Kidneys are obscured by streak artifact  but there may be some mild hydronephrosis  bilaterally, greater on the  right than on the left and there may be at least 1 right renal cyst such  as on image 46.     There is moderate gaseous distention of the colon with moderate amount  of stool in the right colon. No small bowel dilatation is seen. Urinary  bladder is moderately distended. Multiple fluid collections are seen  adjacent to the superior aspect of the urinary bladder and these may  represent multiple urinary bladder diverticula but are somewhat more  numerous than on the previous study of 01/23/2024. There is wall  thickening of the urinary bladder as well. Left hip prosthesis produces  streak artifact and obscures the pelvis as well.       Impression:      1. Some of the images are obscured by streak artifact from patient's  arms and left hip prosthesis.  2. Kidneys are somewhat obscured but there may be mild-to-moderate  bilateral hydronephrosis and at least 1 right renal cyst.  3. Moderate gaseous distention of the colon with moderate amount of  stool in the right colon and rectum.  4. 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 These appear more extensive than on the 01/23/2024  study. Please correlate for urinary bladder outlet obstruction/partial  obstruction.     Radiation dose reduction techniques were utilized, including automated  exposure control and exposure modulation based on body size.          CT Head Without Contrast [[id number]] Collected: 03/25/24 1835     Updated: 03/25/24 1835    Narrative:      CT OF THE BRAIN WITHOUT CONTRAST 03/25/2024     HISTORY: Mental status change.     Axial images were obtained through the brain without intravenous  contrast.     There is moderate diffuse atrophy. There is decreased attenuation of the  periventricular white matter bilaterally consistent with small vessel  white matter ischemic disease. There is no evidence of acute infarction,  hemorrhage, midline shift or mass effect.     No bony  abnormalities are seen.       Impression:      No acute process identified.     Radiation dose reduction techniques were utilized, including automated  exposure control and exposure modulation based on body size.          XR Chest 1 View [[id number]] Collected: 03/25/24 1756     Updated: 03/25/24 1809    Narrative:      XR CHEST 1 VW-3/25/2024     HISTORY: Shortness of breath.     Heart size is within normal limits. Lungs appear free of acute  infiltrates. There is mild vascular congestion. There is some aortic  calcification.       Impression:      1. No acute process except for mild vascular congestion.        This report was finalized on 3/25/2024 6:06 PM by Dr. Dario Dumont M.D on Workstation: [id number]                Assessment:     Urinary retention  Urinary tract infection  MARTITA    Plan:   - No acute urologic surgical intervention recommended.  - Increase Tamsulosin 0.8 mg QD and continue Finasteride  - Bladder scan x 1 - 349 cc  - Insert indwelling catheter. Spoke with patient and daughter. Daughter agrees with forde catheter insertion. Patient willing if daughter agrees with plan.   - Continue Zosyn  - Will monitor Cr   - Recommend catheter remain in place at discharge given patient's MARTITA and urinary tract infection  - Voiding trial appropriate as an outpatient in our office  - Will plan for RASHEED to evaluate for hydronephrosis in 2-3 days or as an outpatient depending on length of stay  - Urology will follow    Electronically signed by Shaylee Rodriguez APRN at 03/26/24 4902

## 2024-03-27 NOTE — PLAN OF CARE
Problem: Adult Inpatient Plan of Care  Goal: Plan of Care Review  Outcome: Ongoing, Not Progressing  Flowsheets (Taken 3/27/2024 1601)  Progress: declining  Plan of Care Reviewed With:   patient   daughter   Goal Outcome Evaluation:  Plan of Care Reviewed With: patient, daughter        Progress: declining

## 2024-03-27 NOTE — PLAN OF CARE
Goal Outcome Evaluation:  Plan of Care Reviewed With: patient           Outcome Evaluation: Pt. is an 79 year old Male admitted with a UTI. Pt./family reports that just prior to admission he was able to stand pivot to/from W/C (primary means of mobility) with assist.  Pt. currently presents with decreased ROM, decreased strength, decreased balance, and decreased tolerance to functional activity.  This PM, pt. requires Max. assist x 2 for bed mobility and Max. assist x 2 for sit <-> stand transfers. Pt. performed sit <-> stand transfers x 2 reps but was unable to stand fully upright with a flexed forward posture and bilateral knee flexion throughout.  Max. verbal/tactile cues given to attempt correction of these impairments.  BLE ther. ex. (AAROM) program x 10 reps completed for general strengthening.  Pt. will benefit from skilled inpt. P.T. to address his functional deficits and to assist pt. in regaining his maximum level of independence with functional mobility.      Anticipated Discharge Disposition (PT): skilled nursing facility

## 2024-03-27 NOTE — THERAPY EVALUATION
Patient Name: Anthony Gallegos  : 1945    MRN: [id number]                              Today's Date: 3/27/2024       Admit Date: 3/25/2024    Visit Dx:     ICD-10-CM ICD-9-CM   1. Decreased oral intake  R63.8 783.9   2. MARTITA (acute kidney injury)  N17.9 584.9   3. Whole body pain  R52 780.96   4. Dysphagia, unspecified type  R13.10 787.20   5. Dehydration  E86.0 276.51   6. Elevated troponin  R79.89 790.6   7. Failure to thrive in adult  R62.7 783.7   8. Generalized weakness  R53.1 780.79   9. Weight loss  R63.4 783.21   10. Urinary tract infection in male  N39.0 599.0     Patient Active Problem List   Diagnosis    Ankylosing spondylitis of cervical region    Recent unexplained weight loss    Abnormal serum lipase level    Abnormal serum level of amylase    Hypertension    Boil    Immobility    Fall from bed    Tetrahydrocannabinol (THC) use disorder, mild, abuse    Generalized pain        Grief    DISH (disseminated idiopathic skeletal hyperostosis)    Generalized weakness    Severe malnutrition    Altered mental status    Transient alteration of awareness    GERD without esophagitis    BPH (benign prostatic hyperplasia)    UTI (urinary tract infection)    Dehydration    MARTITA (acute kidney injury)    Hyperglycemia    Encephalopathy     Past Medical History:   Diagnosis Date    Abscess of scrotum     MARTITA (acute kidney injury)     Altered mental state     Arthritis     AS (ankylosing spondylitis)     Hypertension     Leukocytosis     Tetrahydrocannabinol (THC) use disorder, mild, abuse 2023    Uveitis      Past Surgical History:   Procedure Laterality Date    COLONOSCOPY      ROTATOR CUFF REPAIR Right     TOTAL HIP ARTHROPLASTY Left       General Information       Row Name 24 1533          Physical Therapy Time and Intention    Document Type evaluation  Pt. admitted with a UTI  -MS     Mode of Treatment physical therapy;individual therapy  -MS       Row Name 24 1533          General  Information    Patient Profile Reviewed yes  -MS     Prior Level of Function --  Per family, pt. was able to stand-pivot with assist just prior to admission.  Primary means of mobility is via W/C  -MS     Existing Precautions/Restrictions fall   Exit alarm  -MS     Barriers to Rehab visual deficit   Pt. is photosensitive per nursing staff  -MS       Row Name 03/27/24 1533          Cognition    Orientation Status (Cognition) oriented to;person  -MS       Row Name 03/27/24 1533          Safety Issues, Functional Mobility    Comment, Safety Issues/Impairments (Mobility) Gait belt used for safety.  -MS               User Key  (r) = Recorded By, (t) = Taken By, (c) = Cosigned By      Initials Name Provider Type    Landon Rocha, PT Physical Therapist                   Mobility       Row Name 03/27/24 1530          Bed Mobility    Bed Mobility supine-sit;sit-supine  -MS     Supine-Sit [place] (Bed Mobility) maximum assist (25% patient effort);2 person assist  -MS     Sit-Supine [place] (Bed Mobility) maximum assist (25% patient effort);2 person assist  -MS     Assistive Device (Bed Mobility) draw sheet  -MS     Comment, (Bed Mobility) Once sitting EOB, pt. requires Min. assist x 1 for static sitting balance and Mod. assist x 1 for dynamic sitting balance. Posterior lean throughout.  -MS       Row Name 03/27/24 1531          Sit-Stand Transfer    Sit-Stand [place] (Transfers) maximum assist (25% patient effort);2 person assist  -MS     Assistive Device (Sit-Stand Transfers) --  HHA x 2  -MS     Comment, (Sit-Stand Transfer) Pt. performed sit <-> stand transfers x 2 reps for functional strength training.  Bilateral feet blocked for safety.  Pt. unable to stand fully upright due to flexed forward posture and bilateral knee flexion.  Max. verbal/tactile cues to try and correct posture.  -MS               User Key  (r) = Recorded By, (t) = Taken By, (c) = Cosigned By      Initials Name Provider Type    MS  Landon Townsend, PT Physical Therapist                   Obj/Interventions       Row Name 03/27/24 1537          Range of Motion Comprehensive    Comment, General Range of Motion BUE/LE (Imp. 50%)  -MS       Row Name 03/27/24 1537          Strength Comprehensive (MMT)    Comment, General Manual Muscle Testing (MMT) Assessment BUE/LE (3-/5)  -MS       Row Name 03/27/24 1537          Motor Skills    Therapeutic Exercise --  BLE (AAROM) ther. ex. program x 10 reps completed (Hip Flexion, LAQ's)  -MS               User Key  (r) = Recorded By, (t) = Taken By, (c) = Cosigned By      Initials Name Provider Type    MS Landon Townsend, PT Physical Therapist                   Goals/Plan       Row Name 03/27/24 1539          Bed Mobility Goal 1 (PT)    Activity/Assistive Device (Bed Mobility Goal 1, PT) bed mobility activities, all  -MS     [place] Level/Cues Needed (Bed Mobility Goal 1, PT) moderate assist (50-74% patient effort)  -MS     Time Frame (Bed Mobility Goal 1, PT) long term goal (LTG);1 week  -MS       Row Name 03/27/24 1539          Transfer Goal 1 (PT)    Activity/Assistive Device (Transfer Goal 1, PT) sit-to-stand/stand-to-sit;bed-to-chair/chair-to-bed  With HHA or AAD  -MS     [place] Level/Cues Needed (Transfer Goal 1, PT) moderate assist (50-74% patient effort)  -MS     Time Frame (Transfer Goal 1, PT) long term goal (LTG);1 week  -MS       Row Name 03/27/24 1539          Problem Specific Goal 1 (PT)    Problem Specific Goal 1 (PT) Pt. will perform dynamic sitting balance with CGA x 1.  -MS     Time Frame (Problem Specific Goal 1, PT) long-term goal (LTG);1 week  -MS       Row Name 03/27/24 1539          Therapy Assessment/Plan (PT)    Planned Therapy Interventions (PT) balance training;bed mobility training;home exercise program;postural re-education;patient/family education;transfer training;strengthening;ROM (range of motion)  -MS               User Key  (r) = Recorded By, (t) = Taken By, (c)  = Cosigned By      Initials Name Provider Type    MS TownsendLandon, PT Physical Therapist                   Clinical Impression       Row Name 03/27/24 1537          Pain    Pain Location - Side/Orientation Right  -MS     Pain Location - hip;knee  -MS     Pre/Posttreatment Pain Comment Pt. reports pain in his Right hip/knee with mobility but does not give a specific pain rating.   -MS     Pain Intervention(s) Medication (See MAR);Elevated;Nursing Notified;Repositioned  -MS       Row Name 03/27/24 1537          Plan of Care Review    Plan of Care Reviewed With patient;family  -MS       Row Name 03/27/24 1537          Therapy Assessment/Plan (PT)    Rehab Potential (PT) fair, will monitor progress closely  -MS     Criteria for Skilled Interventions Met (PT) meets criteria  -MS     Therapy Frequency (PT) 5 times/wk  -MS       Row Name 03/27/24 1537          Positioning and Restraints    Pre-Treatment Position in bed  -MS     Post Treatment Position bed  -MS     In Bed notified nsg;supine;call light within reach;encouraged to call for assist;exit alarm on;with family/caregiver;L heel elevated;R heel elevated;RUE elevated;LUE elevated  All lines intact.  -MS               User Key  (r) = Recorded By, (t) = Taken By, (c) = Cosigned By      Initials Name Provider Type    MS Townsend Landon COOLEY, PT Physical Therapist                   Outcome Measures       Row Name 03/27/24 1540 03/27/24 0859       How much help from another person do you currently need...    Turning from your back to your side while in flat bed without using bedrails? 2  -MS 1  -CT    Moving from lying on back to sitting on the side of a flat bed without bedrails? 2  -MS 1  -CT    Moving to and from a bed to a chair (including a wheelchair)? 1  -MS 1  -CT    Standing up from a chair using your arms (e.g., wheelchair, bedside chair)? 1  -MS 1  -CT    Climbing 3-5 steps with a railing? 1  -MS 1  -CT    To walk in hospital room? 1  -MS 1  -CT    AM-PAC 6  Clicks Score (PT) 8  -MS 6  -CT    Highest Level of Mobility Goal 3 --> Sit at edge of bed  -MS 2 --> Bed activities/dependent transfer  -CT      Row Name 03/27/24 1540          Functional Assessment    Outcome Measure Options AM-PAC 6 Clicks Basic Mobility (PT)  -MS               User Key  (r) = Recorded By, (t) = Taken By, (c) = Cosigned By      Initials Name Provider Type    MS Landon Townsend, PT Physical Therapist    Lakesha Singletary, RN Registered Nurse                                 Physical Therapy Education       Title: PT OT SLP Therapies (In Progress)       Topic: Physical Therapy (In Progress)       Point: Mobility training (In Progress)       Learning Progress Summary             Patient Acceptance, E,D, NR by MS at 3/27/2024 1540                         Point: Home exercise program (In Progress)       Learning Progress Summary             Patient Acceptance, E,D, NR by MS at 3/27/2024 1540                         Point: Body mechanics (In Progress)       Learning Progress Summary             Patient Acceptance, E,D, NR by MS at 3/27/2024 1540                         Point: Precautions (In Progress)       Learning Progress Summary             Patient Acceptance, E,D, NR by MS at 3/27/2024 1540                                         User Key       Initials Effective Dates Name Provider Type Discipline    MS 06/16/21 -  Landon Townsend PT Physical Therapist PT                  PT Recommendation and Plan  Planned Therapy Interventions (PT): balance training, bed mobility training, home exercise program, postural re-education, patient/family education, transfer training, strengthening, ROM (range of motion)  Plan of Care Reviewed With: patient  Outcome Evaluation: Pt. is an 79 year old Male admitted with a UTI. Pt./family reports that just prior to admission he was able to stand pivot to/from W/C (primary means of mobility) with assist.  Pt. currently presents with decreased ROM, decreased  strength, decreased balance, and decreased tolerance to functional activity.  This PM, pt. requires Max. assist x 2 for bed mobility and Max. assist x 2 for sit <-> stand transfers. Pt. performed sit <-> stand transfers x 2 reps but was unable to stand fully upright with a flexed forward posture and bilateral knee flexion throughout.  Max. verbal/tactile cues given to attempt correction of these impairments.  BLE ther. ex. (AAROM) program x 10 reps completed for general strengthening.  Pt. will benefit from skilled inpt. P.T. to address his functional deficits and to assist pt. in regaining his maximum level of independence with functional mobility.     Time Calculation:         PT Charges       Row Name 03/27/24 1541             Time Calculation    Start Time 1425  -MS      Stop Time 1445  -MS      Time Calculation (min) 20 min  -MS      PT Received On 03/27/24  -MS      PT - Next Appointment 03/28/24  -MS      PT Goal Re-Cert Due Date 04/03/24  -MS         Time Calculation- PT    Total Timed Code Minutes- PT 19 minute(s)  -MS                User Key  (r) = Recorded By, (t) = Taken By, (c) = Cosigned By      Initials Name Provider Type    Landon Rocha, PT Physical Therapist                  Therapy Charges for Today       Code Description Service Date Service Provider Modifiers Qty    00387147706 HC PT EVAL MOD COMPLEXITY 2 3/27/2024 Landon Townsend, PT GP 1    07179597352 HC PT THERAPEUTIC ACT EA 15 MIN 3/27/2024 Landon Townsend, PT GP 1    58357429664 HC PT THER SUPP EA 15 MIN 3/27/2024 Landon Townsend, PT GP 1            PT G-Codes  Outcome Measure Options: AM-PAC 6 Clicks Basic Mobility (PT)  AM-PAC 6 Clicks Score (PT): 8  PT Discharge Summary  Anticipated Discharge Disposition (PT): skilled nursing facility    Landon Townsend PT  3/27/2024

## 2024-03-27 NOTE — PLAN OF CARE
Goal Outcome Evaluation:         Pt resting comfortably with eyes closed, resp even and unlabored, NAD noted.  Blood noted in catheter with distended bladder, painful with palpation at start of shift.  Catheter irrigated as per order, catheter remained patent.

## 2024-03-27 NOTE — CONSULTS
[facility] Consult Note      Patient Name: Anthony Gallegos  : 1945  MRN: [id number]  Primary Care Physician:  Provider, No Known  Referring Physician: Casie Reardon MD  Date of admission: 3/25/2024    Subjective     Reason for Consult:  MARTITA    HPI:   Anthony Gallegos is a 79 y.o. male admitted 3/25/24 with weakness and poor PO intake; diagnosed with UTI. His baseline SCr 0.8 - 1.0 mg/dL, with hx MARTITA/ATN when hospitalized in 2024 (but he did not followup in our office).  SCR today is 2.5; it was 0.7 upon his discharge earlier this year.  Found to have nephrotic range proteinuria in January.    PMH notable for heavy NSAID overuse in past along with anklyosing spondylitis and uveitis treated with methotrexate; HTN; BPH.  Denies any recent NSAID use. [place] catheter was removed about 3 weeks ago; daughter states he has some urinary incontinence as well as BPH.  Bladder scan yesterday showed 350 ml residual, total UOP yesterday recorded as 3.4L after forde reinserted.       CT Abd Pelvis on 3/25/24 2. Kidneys somewhat obscured but there may be mild-to-moderate bilateral hydronephrosis and at least 1 right renal cyst. Wall thickening of the urinary bladder with several urinary bladder diverticula as seen. These appear more extensive than on the 2024 study.     Family states patient significantly weaker over last 8 weeks   Affirms poor PO intake and poor appetite with dysphagia over last few weeks- will spit up after eating.  Denies Nausea, diarrhea.  SLP planning video swallow test tomorrow and planning Core Track feeding  Blood pressures soft since admission, tenoretic now held  Poor PO intake reported with weight loss about 20 pounds over last 3 months  Mild SOA but not requiring supplemental oxygen    Review of Systems:   14 point review of systems is otherwise negative except for mentioned above on HPI    Personal History     Past Medical History:   Diagnosis Date    • Abscess of scrotum    • MARTITA (acute kidney injury)    • Altered mental state    • Arthritis    • AS (ankylosing spondylitis)    • Hypertension    • Leukocytosis    • Tetrahydrocannabinol (THC) use disorder, mild, abuse 12/20/2023   • Uveitis        Past Surgical History:   Procedure Laterality Date   • COLONOSCOPY     • ROTATOR CUFF REPAIR Right    • TOTAL HIP ARTHROPLASTY Left 2014       Family History: family history includes Alzheimer's disease in his mother.    Social History:  reports that he has never smoked. He has never used smokeless tobacco. He reports that he does not drink alcohol and does not use drugs.    Home Medications:  Prior to Admission medications    Medication Sig Start Date End Date Taking? Authorizing Provider   acetaminophen (TYLENOL) 325 MG tablet Take 2 tablets by mouth Every 4 (Four) Hours As Needed for Mild Pain. 1/30/24  Yes Lakesha Sykes APRN   atenolol-chlorthalidone (TENORETIC) 50-25 MG per tablet Take 1 tablet by mouth Daily.   Yes Elodia Sánchez MD   finasteride (PROSCAR) 5 MG tablet Take 1 tablet by mouth Daily.   Yes Elodia Sánchez MD   melatonin 3 MG tablet Take 1 tablet by mouth At Night As Needed for Sleep. 1/30/24  Yes Lakesha Sykes APRN   methotrexate 2.5 MG tablet Take 1 tablet by mouth 2 (Two) Times a Week. Pt takes every Wednesday and thursday   Yes Elodia Sánchez MD   Omega-3 Fatty Acids (OMEGA 3 PO) Take 1 capsule by mouth Daily.   Yes Elodia Sánchez MD   pantoprazole (PROTONIX) 40 MG EC tablet Take 1 tablet by mouth Every Morning. 1/5/24  Yes Shola Haque MD   tamsulosin (FLOMAX) 0.4 MG capsule 24 hr capsule Take 1 capsule by mouth Every Night. 12/24/23  Yes Tom Abel MD       Allergies:  No Known Allergies    Objective     Vitals:   Temp:  [97.3 °F (36.3 °C)-98.8 °F (37.1 °C)] 98.8 °F (37.1 °C)  Heart Rate:  [70-86] 75  Resp:  [16-18] 18  BP: (109-128)/(51-71) 114/57    Intake/Output Summary (Last  24 hours) at 3/27/2024 1834  Last data filed at 3/27/2024 0559  Gross per 24 hour   Intake --   Output 2900 ml   Net -2900 ml     Wt Readings from Last 1 Encounters:   03/27/24 0617 76.4 kg (168 lb 6.4 oz)   03/25/24 2123 69.1 kg (152 lb 6.4 oz)   03/25/24 1516 79 kg (174 lb 2.6 oz)     Wt Readings from Last 3 Encounters:   03/27/24 76.4 kg (168 lb 6.4 oz)   01/31/24 79 kg (174 lb 3.2 oz)   01/08/24 81.6 kg (179 lb 14.3 oz)       Physical Exam:    General Appearance: frail with muscle wasting, cachetic appearance, movement is quite stiff, he is alert, does answer questions appropriately, dysphonic voice; [place]  Skin: warm and dry, profuse scattered nevi  HEENT: oral mucosa are dry, nonicteric sclera  Neck: stiff,no JVD  Lungs: diminished but clear; not labored  Heart: RRR, normal S1 and S2  Abdomen: soft, nontender, nondistended  : no palpable bladder  Extremities: no edema, cyanosis or clubbing  Neuro: no asterixis    Scheduled Meds:     bisacodyl, 10 mg, Rectal, Once  finasteride, 5 mg, Oral, Daily  heparin (porcine), 5,000 Units, Subcutaneous, Q8H  Menthol-Zinc Oxide, 1 Application, Topical, Q12H  pantoprazole, 40 mg, Oral, Q AM  piperacillin-tazobactam, 3.375 g, Intravenous, Q8H  senna-docusate sodium, 2 tablet, Oral, BID   And  polyethylene glycol, 17 g, Oral, BID  tamsulosin, 0.4 mg, Oral, Nightly      IV Meds:   dextrose 5 % and sodium chloride 0.9 %, 100 mL/hr, Last Rate: 100 mL/hr (03/26/24 2013)        Results Reviewed:   I have personally reviewed the results from the time of this admission to 3/27/2024 18:34 EDT     Lab Results   Component Value Date    GLUCOSE 124 (H) 03/27/2024    CALCIUM 8.4 (L) 03/27/2024     (H) 03/27/2024    K 3.8 03/27/2024    CO2 25.0 03/27/2024     (H) 03/27/2024    BUN 80 (H) 03/27/2024    CREATININE 2.20 (H) 03/27/2024    EGFRIFAFRI 84 03/27/2018    EGFRIFNONA 69 03/27/2018    BCR 36.4 (H) 03/27/2024    ANIONGAP 11.0 03/27/2024      Lab Results   Component Value  Date    MG 2.9 (H) 03/27/2024    PHOS 5.2 (H) 03/27/2024    ALBUMIN 3.0 (L) 03/25/2024           Assessment / Plan     ASSESSMENT:  MARTITA on CKD2 (baseline SCr 0.8 - 1.0 mg/dL), non-oliguric, multifactorial with both prerenal azotemia and post-renal obstruction. Noted poor PO intake, UTI, and bladder outlet obstruction with mild/moderate hydronephrosis.  Hypernatremia noted from poor oral intake. Noted elevated Mg and phosphorus with hypocalcemia, with normal anion gap  BPH, now with [place] catheter; remains on Proscar and flomax  UTI - on IV antibiotics  Hypoalbuminemia  Hypertension - now with softer blood pressures since admission  Weight loss and failure to thrive, dysphagia - appreciate speech pathology consult    PLAN:  Change IVF to hypotonic fluid (half-normal) given significant hyperNa  Feeding tube  Family reviewing goals of care    Thank you for involving us in the care of Anthony Gallegos.  Please feel free to call with any questions.    John Nash MD  03/27/24  18:34 EDT    [facility]  [phone]      Much of this encounter note is an electronic transcription/translation of spoken language to printed text. The electronic translation of spoken language may permit erroneous, or at times, nonsensical words or phrases to be inadvertently transcribed; Although I have reviewed the note for such errors, some may still exist.

## 2024-03-27 NOTE — PROGRESS NOTES
Name: Anthony Gallegos ADMIT: 3/25/2024   : 1945  PCP: Provider, No Known    MRN: [id number] LOS: 2 days   AGE/SEX: 79 y.o. male  ROOM: [facility]/1     Subjective   Subjective   Patient seen this morning, no acute events overnight.  Refused core rack placement yesterday, remains strict n.p.o.  Condition unchanged, oriented to name only, not to time or place.    Review of Systems   As above  Objective   Objective   Vital Signs  Temp:  [97.3 °F (36.3 °C)-98.6 °F (37 °C)] 97.5 °F (36.4 °C)  Heart Rate:  [70-86] 70  Resp:  [16-18] 18  BP: (109-128)/(51-71) 112/60  SpO2:  [90 %-100 %] 90 %  on   ;   Device (Oxygen Therapy): room air  Body mass index is 21.05 kg/m².  Physical Exam    General: Laying in bed, oriented to name only, chronically ill appearing,  HEENT: Normocephalic, atraumatic  CV: Regular rate and rhythm, no murmurs rubs   Lungs: CTA, no wheezing es  Abdomen: Soft, mildly distended, suprapubic tenderness to palpation  Extremities: No significant peripheral edema , no cyanosis, generalized weakness    Results Review     I reviewed the patient's new clinical results.  Results from last 7 days   Lab Units 24  1015 24  0431 24  0406 24  1723   WBC 10*3/mm3 14.65* 10.90* 12.83* 11.85*   HEMOGLOBIN g/dL 11.8* 7.1* 13.0 13.2   PLATELETS 10*3/mm3 389 346 422 471*     Results from last 7 days   Lab Units 24  0649 24  0406 24  1723   SODIUM mmol/L 149* 138 140   POTASSIUM mmol/L 3.8 4.8 4.9   CHLORIDE mmol/L 113* 106 101   CO2 mmol/L 25.0 20.0* 24.0   BUN mg/dL 80* 78* 82*   CREATININE mg/dL 2.20* 2.30* 2.50*   GLUCOSE mg/dL 124* 104* 125*   Estimated Creatinine Clearance: 28.9 mL/min (A) (by C-G formula based on SCr of 2.2 mg/dL (H)).  Results from last 7 days   Lab Units 24  1723   ALBUMIN g/dL 3.0*   BILIRUBIN mg/dL 0.6   ALK PHOS U/L 112   AST (SGOT) U/L 16   ALT (SGPT) U/L 15     Results from last 7 days   Lab Units 24  0649 24  1152 24  0406  03/25/24  1723   CALCIUM mg/dL 8.4*  --  8.9 9.7   ALBUMIN g/dL  --   --   --  3.0*   MAGNESIUM mg/dL 2.9* 3.0*  --  2.6*   PHOSPHORUS mg/dL 5.2* 5.6*  --   --      Results from last 7 days   Lab Units 03/25/24  2303 03/25/24  1956 03/25/24  1723   PROCALCITONIN ng/mL  --   --  3.36*   LACTATE mmol/L 1.8 2.1* 2.4*     COVID19   Date Value Ref Range Status   03/25/2024 Not Detected Not Detected - Ref. Range Final     Glucose   Date/Time Value Ref Range Status   03/27/2024 1118 107 70 - 130 mg/dL Final   03/27/2024 0742 123 70 - 130 mg/dL Final   03/27/2024 0648 120 70 - 130 mg/dL Final   03/26/2024 1937 89 70 - 130 mg/dL Final   03/26/2024 1145 87 70 - 130 mg/dL Final           CT Head Without Contrast  Narrative: CT OF THE BRAIN WITHOUT CONTRAST 03/25/2024     HISTORY: Mental status change.     Axial images were obtained through the brain without intravenous  contrast.     There is moderate diffuse atrophy. There is decreased attenuation of the  periventricular white matter bilaterally consistent with small vessel  white matter ischemic disease. There is no evidence of acute infarction,  hemorrhage, midline shift or mass effect.     No bony abnormalities are seen.     Impression: No acute process identified.     Radiation dose reduction techniques were utilized, including automated  exposure control and exposure modulation based on body size.        This report was finalized on 3/27/2024 12:38 PM by Dr. Dario Dumont M.D on Workstation: [id number]       Scheduled Medications  bisacodyl, 10 mg, Rectal, Once  finasteride, 5 mg, Oral, Daily  heparin (porcine), 5,000 Units, Subcutaneous, Q8H  Menthol-Zinc Oxide, 1 Application, Topical, Q12H  pantoprazole, 40 mg, Oral, Q AM  piperacillin-tazobactam, 3.375 g, Intravenous, Q8H  senna-docusate sodium, 2 tablet, Oral, BID   And  polyethylene glycol, 17 g, Oral, BID  tamsulosin, 0.4 mg, Oral, Nightly    Infusions  dextrose 5 % and sodium chloride 0.9 %, 100 mL/hr, Last  Rate: 100 mL/hr (03/26/24 2013)    Diet  NPO Diet NPO Type: Strict NPO    I have personally reviewed     [x]  Laboratory   [x]  Microbiology   [x]  Radiology   []  EKG/Telemetry  []  Cardiology/Vascular   []  Pathology    []  Records       Assessment/Plan     Active Hospital Problems    Diagnosis  POA    **UTI (urinary tract infection) [N39.0]  Yes    Encephalopathy [G93.40]  Unknown    MARTITA (acute kidney injury) [N17.9]  Yes    Severe malnutrition [E43]  Yes    Hypertension [I10]  Yes      Resolved Hospital Problems   No resolved problems to display.       Patient is a 78 y.o. male with a history of HTN, BPH pyelonephritis/UTI, who presented to [facility] from facility for failure to thrive, not eating or drinking much, weight loss, decreased mobility.    UTI  -Urine culture and blood culture pending  -Recent urine culture was positive for Klebsiella, sensitive to Zosyn  -Continue IV Zosyn, follow-up culture results  -Preliminary urine culture growing more than 100,000 gram-negative bacilli  -WBC increased today, afebrile.    MARTITA/bilateral hydronephrosis  -Creatinine 2.5 on admission, up from 0.66 on 01/29/2024  -CT scan showed mild-to-moderate bilateral hydronephrosis   -Status post Mariscal catheter placement 03/26/2024 per urology  -Continue IV fluids,, monitor daily BMP  -Hold outpatient tenoretic    -Creatinine stable around 2.2,  -Nephrology evaluated  -Sodium increased up to 149 today, will defer fluid adjustment to neurology      Encephalopathy  -Patient oriented to name only, not to time or place  -No history of dementia per daughter, at baseline oriented x 3  -CT head with no acute findings  -Likely secondary to UTI as above  -Continue  treatment for UTI, monitor neurological status  -Remains oriented to name only    Failure to thrive/malnutrition/decreased p.o. intake  -SLP evaluated, recommend strict n.p.o.  -Core track and tube feeds ordered however patient refused  -Palliative care consult  pending for goals of care discussion  -Nutrition following  -Speech therapy to reevaluate    Constipation  -CT scan showed moderate gaseous distention of the colon with moderate amount of  stool in the right colon and rectum.  -Ordered suppository, initiated on bowel regimen however patient remains trach n.p.o. per speech therapy recommendation  -Patient refused suppository      Hypertension  -BP acutely stable  -Holding outpatient Tenoretic      Subcu heparin  for DVT prophylaxis.  Full code.  Discussed with RN  Discussed with nephrology  Anticipate discharge TBD      Copied text in this note has been reviewed and is accurate as of 03/27/24.         Dictated utilizing Dragon dictation        Nomi Osman MD  [facility]  03/27/24  13:27 EDT

## 2024-03-28 ENCOUNTER — APPOINTMENT (OUTPATIENT)
Dept: GENERAL RADIOLOGY | Facility: HOSPITAL | Age: 79
DRG: 689 | End: 2024-03-28
Payer: MEDICARE

## 2024-03-28 PROBLEM — R63.8 DECREASED ORAL INTAKE: Status: ACTIVE | Noted: 2024-03-25

## 2024-03-28 PROBLEM — R13.10 DYSPHAGIA: Status: ACTIVE | Noted: 2024-03-25

## 2024-03-28 LAB
ALBUMIN SERPL-MCNC: 2.5 G/DL (ref 3.5–5.2)
ANION GAP SERPL CALCULATED.3IONS-SCNC: 9.7 MMOL/L (ref 5–15)
BACTERIA SPEC AEROBE CULT: ABNORMAL
BASOPHILS # BLD AUTO: 0.06 10*3/MM3 (ref 0–0.2)
BASOPHILS NFR BLD AUTO: 0.6 % (ref 0–1.5)
BUN SERPL-MCNC: 62 MG/DL (ref 8–23)
BUN/CREAT SERPL: 33.3 (ref 7–25)
CALCIUM SPEC-SCNC: 8.5 MG/DL (ref 8.6–10.5)
CHLORIDE SERPL-SCNC: 117 MMOL/L (ref 98–107)
CO2 SERPL-SCNC: 26.3 MMOL/L (ref 22–29)
CREAT SERPL-MCNC: 1.86 MG/DL (ref 0.76–1.27)
DEPRECATED RDW RBC AUTO: 40.2 FL (ref 37–54)
EGFRCR SERPLBLD CKD-EPI 2021: 36.1 ML/MIN/1.73
EOSINOPHIL # BLD AUTO: 0.16 10*3/MM3 (ref 0–0.4)
EOSINOPHIL NFR BLD AUTO: 1.5 % (ref 0.3–6.2)
ERYTHROCYTE [DISTWIDTH] IN BLOOD BY AUTOMATED COUNT: 13.1 % (ref 12.3–15.4)
GLUCOSE BLDC GLUCOMTR-MCNC: 88 MG/DL (ref 70–130)
GLUCOSE BLDC GLUCOMTR-MCNC: 97 MG/DL (ref 70–130)
GLUCOSE SERPL-MCNC: 103 MG/DL (ref 65–99)
HCT VFR BLD AUTO: 33 % (ref 37.5–51)
HGB BLD-MCNC: 10.6 G/DL (ref 13–17.7)
IMM GRANULOCYTES # BLD AUTO: 0.09 10*3/MM3 (ref 0–0.05)
IMM GRANULOCYTES NFR BLD AUTO: 0.9 % (ref 0–0.5)
LYMPHOCYTES # BLD AUTO: 1.87 10*3/MM3 (ref 0.7–3.1)
LYMPHOCYTES NFR BLD AUTO: 17.9 % (ref 19.6–45.3)
MAGNESIUM SERPL-MCNC: 3.3 MG/DL (ref 1.6–2.4)
MCH RBC QN AUTO: 27.6 PG (ref 26.6–33)
MCHC RBC AUTO-ENTMCNC: 32.1 G/DL (ref 31.5–35.7)
MCV RBC AUTO: 85.9 FL (ref 79–97)
MONOCYTES # BLD AUTO: 0.93 10*3/MM3 (ref 0.1–0.9)
MONOCYTES NFR BLD AUTO: 8.9 % (ref 5–12)
NEUTROPHILS NFR BLD AUTO: 7.34 10*3/MM3 (ref 1.7–7)
NEUTROPHILS NFR BLD AUTO: 70.2 % (ref 42.7–76)
NRBC BLD AUTO-RTO: 0 /100 WBC (ref 0–0.2)
PHOSPHATE SERPL-MCNC: 3.5 MG/DL (ref 2.5–4.5)
PLATELET # BLD AUTO: 478 10*3/MM3 (ref 140–450)
PMV BLD AUTO: 8.6 FL (ref 6–12)
POTASSIUM SERPL-SCNC: 3.3 MMOL/L (ref 3.5–5.2)
RBC # BLD AUTO: 3.84 10*6/MM3 (ref 4.14–5.8)
SODIUM SERPL-SCNC: 153 MMOL/L (ref 136–145)
WBC NRBC COR # BLD AUTO: 10.45 10*3/MM3 (ref 3.4–10.8)

## 2024-03-28 PROCEDURE — 25010000002 PIPERACILLIN SOD-TAZOBACTAM PER 1 G: Performed by: STUDENT IN AN ORGANIZED HEALTH CARE EDUCATION/TRAINING PROGRAM

## 2024-03-28 PROCEDURE — 25010000002 POTASSIUM CHLORIDE 10 MEQ/100ML SOLUTION: Performed by: INTERNAL MEDICINE

## 2024-03-28 PROCEDURE — 82948 REAGENT STRIP/BLOOD GLUCOSE: CPT

## 2024-03-28 PROCEDURE — 92611 MOTION FLUOROSCOPY/SWALLOW: CPT

## 2024-03-28 PROCEDURE — 0 DEXTROSE 5 % SOLUTION: Performed by: INTERNAL MEDICINE

## 2024-03-28 PROCEDURE — 80069 RENAL FUNCTION PANEL: CPT | Performed by: INTERNAL MEDICINE

## 2024-03-28 PROCEDURE — 99497 ADVNCD CARE PLAN 30 MIN: CPT | Performed by: NURSE PRACTITIONER

## 2024-03-28 PROCEDURE — 83735 ASSAY OF MAGNESIUM: CPT | Performed by: STUDENT IN AN ORGANIZED HEALTH CARE EDUCATION/TRAINING PROGRAM

## 2024-03-28 PROCEDURE — 85025 COMPLETE CBC W/AUTO DIFF WBC: CPT | Performed by: STUDENT IN AN ORGANIZED HEALTH CARE EDUCATION/TRAINING PROGRAM

## 2024-03-28 PROCEDURE — 99222 1ST HOSP IP/OBS MODERATE 55: CPT | Performed by: SURGERY

## 2024-03-28 PROCEDURE — 74230 X-RAY XM SWLNG FUNCJ C+: CPT

## 2024-03-28 PROCEDURE — 25010000002 HEPARIN (PORCINE) PER 1000 UNITS: Performed by: STUDENT IN AN ORGANIZED HEALTH CARE EDUCATION/TRAINING PROGRAM

## 2024-03-28 PROCEDURE — 99233 SBSQ HOSP IP/OBS HIGH 50: CPT | Performed by: NURSE PRACTITIONER

## 2024-03-28 RX ORDER — DEXTROSE MONOHYDRATE 50 MG/ML
125 INJECTION, SOLUTION INTRAVENOUS CONTINUOUS
Status: DISCONTINUED | OUTPATIENT
Start: 2024-03-28 | End: 2024-03-29

## 2024-03-28 RX ORDER — POTASSIUM CHLORIDE 7.45 MG/ML
10 INJECTION INTRAVENOUS
Status: COMPLETED | OUTPATIENT
Start: 2024-03-28 | End: 2024-03-28

## 2024-03-28 RX ADMIN — PIPERACILLIN AND TAZOBACTAM 3.38 G: 3; .375 INJECTION, POWDER, FOR SOLUTION INTRAVENOUS at 19:45

## 2024-03-28 RX ADMIN — PIPERACILLIN AND TAZOBACTAM 3.38 G: 3; .375 INJECTION, POWDER, FOR SOLUTION INTRAVENOUS at 11:32

## 2024-03-28 RX ADMIN — PIPERACILLIN AND TAZOBACTAM 3.38 G: 3; .375 INJECTION, POWDER, FOR SOLUTION INTRAVENOUS at 02:42

## 2024-03-28 RX ADMIN — POTASSIUM CHLORIDE 10 MEQ: 7.46 INJECTION, SOLUTION INTRAVENOUS at 13:12

## 2024-03-28 RX ADMIN — BARIUM SULFATE 50 ML: 400 SUSPENSION ORAL at 10:11

## 2024-03-28 RX ADMIN — BARIUM SULFATE 55 ML: 0.81 POWDER, FOR SUSPENSION ORAL at 10:10

## 2024-03-28 RX ADMIN — ANORECTAL OINTMENT 1 APPLICATION: 15.7; .44; 24; 20.6 OINTMENT TOPICAL at 09:10

## 2024-03-28 RX ADMIN — POTASSIUM CHLORIDE 10 MEQ: 7.46 INJECTION, SOLUTION INTRAVENOUS at 15:16

## 2024-03-28 RX ADMIN — DEXTROSE MONOHYDRATE 125 ML/HR: 50 INJECTION, SOLUTION INTRAVENOUS at 10:43

## 2024-03-28 RX ADMIN — POTASSIUM CHLORIDE 10 MEQ: 7.46 INJECTION, SOLUTION INTRAVENOUS at 13:11

## 2024-03-28 RX ADMIN — BARIUM SULFATE 4 ML: 980 POWDER, FOR SUSPENSION ORAL at 10:10

## 2024-03-28 RX ADMIN — HEPARIN SODIUM 5000 UNITS: 5000 INJECTION INTRAVENOUS; SUBCUTANEOUS at 15:18

## 2024-03-28 RX ADMIN — POTASSIUM CHLORIDE 10 MEQ: 7.46 INJECTION, SOLUTION INTRAVENOUS at 15:17

## 2024-03-28 RX ADMIN — DEXTROSE MONOHYDRATE 125 ML/HR: 50 INJECTION, SOLUTION INTRAVENOUS at 19:46

## 2024-03-28 RX ADMIN — HEPARIN SODIUM 5000 UNITS: 5000 INJECTION INTRAVENOUS; SUBCUTANEOUS at 06:54

## 2024-03-28 NOTE — PROGRESS NOTES
Name: Anthony Gallegos ADMIT: 3/25/2024   : 1945  PCP: Provider, No Known    MRN: [id number] LOS: 3 days   AGE/SEX: 79 y.o. male  ROOM: 123/1     Subjective   Subjective   Patient seen this morning, no acute events overnight.  Seems to be more alert, oriented to name only.  Speech was soft, difficult to understand. No fevers, chills, chest pain or palpitations.    Review of Systems   As above  Objective   Objective   Vital Signs  Temp:  [97.3 °F (36.3 °C)-98.8 °F (37.1 °C)] 98 °F (36.7 °C)  Heart Rate:  [75-92] 79  Resp:  [16-18] 16  BP: (105-139)/(57-67) 115/63  SpO2:  [96 %-100 %] 99 %  on   ;   Device (Oxygen Therapy): room air  Body mass index is 21.19 kg/m².  Physical Exam    General: Laying in bed, oriented to name only, chronically ill-appearing,  HEENT: Normocephalic, atraumatic  CV: Regular rate and rhythm, no murmurs rubs   Lungs: CTA, no wheezing   Abdomen: Soft, mildly distended, nontender  Extremities: No significant peripheral edema , no cyanosis    Results Review     I reviewed the patient's new clinical results.  Results from last 7 days   Lab Units 24  0757 24  1015 24  0431 24  0406   WBC 10*3/mm3 10.45 14.65* 10.90* 12.83*   HEMOGLOBIN g/dL 10.6* 11.8* 7.1* 13.0   PLATELETS 10*3/mm3 478* 389 346 422     Results from last 7 days   Lab Units 24  0757 24  0649 24  0406 24  1723   SODIUM mmol/L 153* 149* 138 140   POTASSIUM mmol/L 3.3* 3.8 4.8 4.9   CHLORIDE mmol/L 117* 113* 106 101   CO2 mmol/L 26.3 25.0 20.0* 24.0   BUN mg/dL 62* 80* 78* 82*   CREATININE mg/dL 1.86* 2.20* 2.30* 2.50*   GLUCOSE mg/dL 103* 124* 104* 125*   Estimated Creatinine Clearance: 34.5 mL/min (A) (by C-G formula based on SCr of 1.86 mg/dL (H)).  Results from last 7 days   Lab Units 24  0757 24  1723   ALBUMIN g/dL 2.5* 3.0*   BILIRUBIN mg/dL  --  0.6   ALK PHOS U/L  --  112   AST (SGOT) U/L  --  16   ALT (SGPT) U/L  --  15     Results from last 7 days   Lab  Units 03/28/24  0757 03/27/24  0649 03/26/24  1152 03/26/24  0406 03/25/24  1723   CALCIUM mg/dL 8.5* 8.4*  --  8.9 9.7   ALBUMIN g/dL 2.5*  --   --   --  3.0*   MAGNESIUM mg/dL 3.3* 2.9* 3.0*  --  2.6*   PHOSPHORUS mg/dL 3.5 5.2* 5.6*  --   --      Results from last 7 days   Lab Units 03/25/24  2303 03/25/24  1956 03/25/24  1723   PROCALCITONIN ng/mL  --   --  3.36*   LACTATE mmol/L 1.8 2.1* 2.4*     COVID19   Date Value Ref Range Status   03/25/2024 Not Detected Not Detected - Ref. Range Final     Glucose   Date/Time Value Ref Range Status   03/28/2024 1117 97 70 - 130 mg/dL Final   03/28/2024 0547 88 70 - 130 mg/dL Final   03/27/2024 2100 94 70 - 130 mg/dL Final   03/27/2024 1721 85 70 - 130 mg/dL Final   03/27/2024 1118 107 70 - 130 mg/dL Final   03/27/2024 0742 123 70 - 130 mg/dL Final   03/27/2024 0648 120 70 - 130 mg/dL Final           FL Video Swallow Single Contrast  VIDEO SWALLOWING EXAMINATION BY SPEECH PATHOLOGY     Clinical: Dysphasia     Reference air kerma: 6.73 mGy     Video swallowing examination performed under the direction of speech  pathology. Imaging reviewed by radiologist who concurs with the  findings.     Speech pathology summary:      VFSS completed, Kristal SONG present. Patient presents with severe  oropharyngeal dysphagia. Patient demonstrated severe-profound  generalized pharyngeal weakness, decreased coordination/timing, and  decreased glottic closure. Patient demonstrated silent aspiration of  thin liquids before, during, and after the swallow. Moderate residue of  thin pooling in pharynx after 6+ swallows, incomplete swallows in  attempt to clear residue. Unable to clear aspirated and penetrated  materials with cued cough/throat clear. Patient demonstrated aspiration  of residue with honey thick liquids via spoon/cup, risk of aspiration  residue with nectar thick liquids. Multiple swallows with nectar thick,  honey thick, puree. Incomplete bolus clearance and moderate  residue  after the swallow, incomplete swallows in attempts to clear residue.              Scheduled Medications  bisacodyl, 10 mg, Rectal, Once  finasteride, 5 mg, Oral, Daily  heparin (porcine), 5,000 Units, Subcutaneous, Q8H  Menthol-Zinc Oxide, 1 Application, Topical, Q12H  pantoprazole, 40 mg, Oral, Q AM  piperacillin-tazobactam, 3.375 g, Intravenous, Q8H  senna-docusate sodium, 2 tablet, Oral, BID   And  polyethylene glycol, 17 g, Oral, BID  potassium chloride, 10 mEq, Intravenous, Q1H  tamsulosin, 0.4 mg, Oral, Nightly    Infusions  dextrose, 125 mL/hr, Last Rate: 125 mL/hr (03/28/24 1043)    Diet  NPO Diet NPO Type: Strict NPO    I have personally reviewed     [x]  Laboratory   [x]  Microbiology   [x]  Radiology   []  EKG/Telemetry  []  Cardiology/Vascular   []  Pathology    []  Records       Assessment/Plan     Active Hospital Problems    Diagnosis  POA    **UTI (urinary tract infection) [N39.0]  Yes    Encephalopathy [G93.40]  Unknown    MARTITA (acute kidney injury) [N17.9]  Yes    Severe malnutrition [E43]  Yes    Hypertension [I10]  Yes      Resolved Hospital Problems   No resolved problems to display.       Patient is a 78 y.o. male with a history of HTN, BPH pyelonephritis/UTI, who presented to [facility] from facility for failure to thrive, not eating or drinking much, weight loss, decreased mobility.    UTI  -Urine culture and blood culture pending  -Recent urine culture was positive for Klebsiella, sensitive to Zosyn  -Preliminary urine culture growing more than 100,000 gram-negative bacilli  -Continue IV Zosyn pending final urine culture results    MARTITA/bilateral hydronephrosis  -Creatinine 2.5 on admission, up from 0.66 on 01/29/2024  -CT scan showed mild-to-moderate bilateral hydronephrosis   -Status post Mariscal catheter placement 03/26/2024 per urology  -Continue IV fluids,, monitor daily BMP  -Hold outpatient tenoretic    -Creatinine improving, sodium trended up  -IV fluids changed to  D5W  -Nephrology managing      Encephalopathy  -Patient oriented to name only, not to time or place  -No history of dementia per daughter, at baseline oriented x 3  -CT head with no acute findings  -Likely secondary to UTI as above  -Continue  treatment for UTI, monitor neurological status  -Remains oriented to name only    Failure to thrive/malnutrition/decreased p.o. intake  -Core track and tube feeds ordered however patient refused  -speech therapy  reevaluated, pending underwent VFSS 03/28/2024, found to have diffuse to be profound generalized pharyngeal weakness, silent aspiration.  Strict n.p.o. recommended  -Palliative care evaluated for goals of care discussion, family wants to proceed with PEG tube placement.  General surgery consulted for evaluation for PEG tube placement.    Constipation  -CT scan showed moderate gaseous distention of the colon with moderate amount of  stool in the right colon and rectum.  -Ordered suppository, initiated on bowel regimen however patient remains trach n.p.o. per speech therapy recommendation  -Patient refused suppository  -Discussed with RN to reattempt suppository again      Hypertension  -BP acutely stable  -Holding outpatient Tenoretic    Anemia  -Hemoglobin stable  -Monitor CBC  -Ordered iron panel, B12 folate      Subcu heparin  for DVT prophylaxis.  Full code.  Patient palliative care appropriate.  Palliative care evaluated 03/28 plan to continue current-treatment, remains full code.  Discussed with RN  Discussed with palliative care  Anticipate discharge TBD      Copied text in this note has been reviewed and is accurate as of 03/28/24.         Dictated utilizing Dragon dictation        Nomi Osman MD  [facility]  03/28/24  14:13 EDT

## 2024-03-28 NOTE — PROGRESS NOTES
.            [facility]    Palliative Care Daily Progress Note   Chief complaint-follow up goals of care/advanced care planning and support for patient/family    Code Status:   Code Status and Medical Interventions:   Ordered at: 03/25/24 1943     Code Status (Patient has no pulse and is not breathing):    CPR (Attempt to Resuscitate)     Medical Interventions (Patient has pulse or is breathing):    Full      Advanced Directives: Advance Directive Status: Patient does not have advance directive   Goals of Care: Ongoing.     S: Medical record reviewed. Events noted.  Patient resting in bed. Has complaints of dry mouth, which I assisted with oral care. Family at bedside. I reviewed labs, medical notes, therapy notes and MAR. Also reviewed VFSS findings. I spoke with TERESA Crowder.              Review of Systems   Constitutional: Positive for decreased appetite and malaise/fatigue.   Cardiovascular:  Negative for chest pain.   Respiratory:  Negative for shortness of breath.    Gastrointestinal:  Negative for abdominal pain and nausea.   Neurological:  Positive for weakness.   Psychiatric/Behavioral:  Negative for depression. The patient is not nervous/anxious.      Pain Assessment  CPOT and PAINAD Scales: CPOT (Critical-Care Pain Observation Tool)  CPOT Facial Expression: 1-->tense  CPOT Body Movements: 1-->protection  CPOT Muscle Tension: 0-->relaxed  Ventilator Compliance/Vocalization: 1-->sighing, moaning  CPOT Score: 3  Pain Location: back  Pain Description:  (pt unable to describe)  Factors That Relieve Pain: rest, repositioning  Lifestyle Changes/Adaptations in Response to Pain:  (unknown)    O:     Intake/Output Summary (Last 24 hours) at 3/28/2024 1402  Last data filed at 3/28/2024 1205  Gross per 24 hour   Intake --   Output 2750 ml   Net -2750 ml       Diagnostics and current medications: Reviewed.    Current Facility-Administered Medications   Medication Dose Route  Frequency Provider Last Rate Last Admin    acetaminophen (TYLENOL) tablet 650 mg  650 mg Oral Q4H PRN Casie Reardon MD        sennosides-docusate (PERICOLACE) 8.6-50 MG per tablet 2 tablet  2 tablet Oral BID Nomi Osman MD        And    polyethylene glycol (MIRALAX) packet 17 g  17 g Oral BID Nomi Osman MD        And    bisacodyl (DULCOLAX) EC tablet 5 mg  5 mg Oral Daily PRN Nomi Osman MD        And    bisacodyl (DULCOLAX) suppository 10 mg  10 mg Rectal Daily PRN Nomi Osman MD        bisacodyl (DULCOLAX) suppository 10 mg  10 mg Rectal Once Noim Osman MD        dextrose (D50W) (25 g/50 mL) IV injection 25 g  25 g Intravenous Q15 Min PRN Nomi Osman MD        dextrose (D5W) 5 % infusion  125 mL/hr Intravenous Continuous ErbeckTosha  mL/hr at 03/28/24 1043 125 mL/hr at 03/28/24 1043    dextrose (GLUTOSE) oral gel 15 g  15 g Oral Q15 Min PRN Nomi Osman MD        finasteride (PROSCAR) tablet 5 mg  5 mg Oral Daily StingCasie sandoval MD        glucagon (GLUCAGEN) injection 1 mg  1 mg Subcutaneous Q15 Min PRN Nomi Osman MD        heparin (porcine) 5000 UNIT/ML injection 5,000 Units  5,000 Units Subcutaneous Q8H Nomi Osman MD   5,000 Units at 03/28/24 0654    melatonin tablet 3 mg  3 mg Oral Nightly PRN Casie Reardon MD        Menthol-Zinc Oxide 1 Application  1 Application Topical Q12H Nomi Osman MD   1 Application at 03/28/24 0910    ondansetron ODT (ZOFRAN-ODT) disintegrating tablet 4 mg  4 mg Oral Q6H PRN Casie Reardon MD        Or    ondansetron (ZOFRAN) injection 4 mg  4 mg Intravenous Q6H PRN Casie Reardon MD        pantoprazole (PROTONIX) EC tablet 40 mg  40 mg Oral Q AM Casie Reardon MD        piperacillin-tazobactam (ZOSYN) 3.375 g IVPB in 100 mL NS MBP (CD)  3.375 g Intravenous Q8H Nomi Osman MD   3.375 g at 03/28/24 1132    potassium  "chloride 10 mEq in 100 mL IVPB  10 mEq Intravenous Q1H Tosha Cummings  mL/hr at 03/28/24 1312 10 mEq at 03/28/24 1312    sodium chloride 0.9 % flush 10 mL  10 mL Intravenous PRN Ashleigh Early MD   10 mL at 03/25/24 1719    tamsulosin (FLOMAX) 24 hr capsule 0.4 mg  0.4 mg Oral Nightly Casie Reardon MD         dextrose, 125 mL/hr, Last Rate: 125 mL/hr (03/28/24 1043)        acetaminophen    senna-docusate sodium **AND** polyethylene glycol **AND** bisacodyl **AND** bisacodyl    dextrose    dextrose    glucagon (human recombinant)    melatonin    ondansetron ODT **OR** ondansetron    [COMPLETED] Insert Peripheral IV **AND** sodium chloride  Attest that current medications reviewed including but not limited to prescriptions, over-the counter, herbals and vitamin/mineral/dietary (nutritional) supplements for name, route of administration, type, dose and frequency and are current using all immediate resources available at time of dictation.    A:    /63 (BP Location: Right arm, Patient Position: Lying)   Pulse 79   Temp 98 °F (36.7 °C) (Oral)   Resp 16   Ht 190.5 cm (75\")   Wt 76.9 kg (169 lb 8 oz)   SpO2 99%   BMI 21.19 kg/m²     Constitutional:       Appearance: Not in distress. Cachectic and frail. Chronically ill-appearing.   Pulmonary:      Effort: Pulmonary effort is normal.   Cardiovascular:      PMI at left midclavicular line.   Edema:     Peripheral edema absent.   Abdominal:      Palpations: Abdomen is soft.   Genitourinary:     Comments: F/c with kristina urine noted   Neurological:      Mental Status: Alert.      Comments: Oriented to self and location   Unsure of situation fully, unsure of year   Psychiatric:         Behavior: Behavior is cooperative.         Patient status: Disease state: Controlled with current treatments.  Functional status: Palliative Performance Scale Score: Performance 40% based on the following measures: Ambulation: Mainly in bed, Activity and " Evidence of Disease: Unable to do any work, extensive evidence of disease, Self-Care: Mainly assistance required,  Intake: Normal or reduced, LOC: Full, drowsy or confusion   Nutritional status: Albumin 3.0 on 3/25/2024  Body mass index is 21.05 kg/m².   Family support: The patient receives support from his children..  Advance Directives: Advance Directive Status: Patient does not have advance directive   POA/Healthcare surrogate-n/a.     Impression/Problem List:     UTI   Bilateral hydronephrosis  Urinary retention  Failure to thrive  Dysphagia  Hypertension      Deconditioned          Recommendations/Plan:  1. Provide support with goals of care  2. General surgery consult for PEG placement   3. [facility] referral         2.  Palliative care encounter  3/27/2024- The patient is unable to participate in goals of care conversation due to confusion and unaware of situation. I attempted to call his daughter and unable to leave a voicemail.I also called second listed contact, Nik and no answer and was I able to leave a generic voicemail.  I was able to eventually get in touch with patient daughter, Whit at 1138. She has a fair understanding of the patient conditions, which we reviewed in detail. She reports since December 2023 the patient has progressively declined. He has had multiple hospital admissions, leading to rehab stays and he did most recently return back to [facility] assisted living facility where she pays for personal care to assist with bathing, clothing etc. He has been mostly wheelchair bound. His cognition has varied as well since mid January. She reports he hasn't been diagnosed with dementia or cognitive disorder. She reports his appetite has worsened over the last couple months and he favors more sweets than anything else. The patient doesn't have a living will/advance directive. He does have two children , Whit and Nik, however Whit handles his affairs.I did provide her with information  regarding palliative care. We also discussed CODE status/medical interventions and she reports her father previously told her he wanted CPR and to be kept alive. We briefly discussed risk and benefit of such intervention with the patient current state of health.  We discussed concerns for dysphagia, ST evaluation (NPO) and recommendations. Also discussed coretrak, PEG, and comfort feeding. The patient is  slightly more awake per bedside RN so will ask ST to reevaluate patient for diet and daughter made aware. She informed me the patient declined coretrak as well.  She was very overwhelmed with conversation and she plans to visit her father today. I will plan to follow up tomorrow with her at 1300 to discuss goals further. I spoke with TERESA Crowder.     3/28/2024- I met with patient daughter, Whit at bedside in the presence of the patient. The patient is oriented to self, location, he is unsure of reason for hospitalization. We reviewed patient medical conditions, acute and chronic. We also discussed in detail VFSS findings from this morning and discussed risk and benefits of PEG placement. Also explanation of process of PEG placement.  They are aware it wouldn't remove all risk for aspiration. They are aware no safe alternative diet options were provided based on findings as well. Also aware of poor long term prognosis with patient deconditioned state. The patient and daughter want PEG placement and return to facility with rehab options. I did revisit discussion regarding CPR and medical interventions (ventilator and dialysis)  and they wish to be FULL code with all interventions for now. Whit does express being overwhelmed with everything as she recently started caring for her father and is also assisting with caring for the patient's brother who resides in the same facility. Of note, the patient spouse  in 2023 and he relocated to [place] from [place] 2023 after becoming ill. She will need to  discuss facility placement with CCP due to decision to proceed with PEG. I did provide her with information regarding [facility] and she is agreeable, so referral will be placed.  She would like their support with further goals of are conversation in outpatient setting. Of note, Whit is planning to go to court tomorrow to obtain guardianship of patient.  They were very appreciative of conversation and support. I spoke with Dr Osman and TERESA Crowder.       Thank you for this consult and allowing us to participate in patient's plan of care. Palliative Care Team will sign off and see prn.     Time spent:45 minutes spent reviewing medical and medication records, assessing and examining patient, discussing with family, answering questions, providing some guidance about a plan and documentation of care, and coordinating care with other healthcare members, with > 50% time spent face to face.   30 minutes spent on advance care planning.    Emma Corcoran, APRN  3/28/2024  14:02 EDT

## 2024-03-28 NOTE — PLAN OF CARE
Goal Outcome Evaluation:  Plan of Care Reviewed With: patient        Progress: improving  Outcome Evaluation: Pt arrived from Obs unit AOx2, disoriented to time and situation. Speech is very garbled and almost incomprehensible. Strict NPO. Pt to have PEG tube with EGD tomorrow. Consent signed by daughter and in chart. Pt with no complaints. VSS. Safety maintained.

## 2024-03-28 NOTE — PLAN OF CARE
Goal Outcome Evaluation:  Plan of Care Reviewed With: patient           Outcome Evaluation: VFSS completed. Recommend NPO versus long term alternate nutrition. Recommend meds alternate route. Patient with diffuse severe-profound generalized pharyngeal weakness. Silent aspiration observed, at risk for aspiration of all PO secondary to inability to clear diffuse mild-moderate pharyngeal residue. Pending goals of care, recommend long term alternate nutrition with long term dysphagia therapy 8 weeks minimum prior to repeat instrumental evaluation. Patient unlikely to progress with cortrak/temporary means of alternate nutrition due to severity of swallow function. Concern PO for quality of life could be uncomfortable secondary to amount of residue and delayed throat clearing and repeat swallows over time in attempt to clear. If patient desires PO for QOL, SLP does not deem one consistency safer than another secondary to risk of aspiration from residue. SLP to follow peripherally for further needs.

## 2024-03-28 NOTE — PROGRESS NOTES
[facility] Progress Note      Patient Name: Anthony Gallegos  : 1945  MRN: [id number]  Primary Care Physician:  Provider, No Known  Date of admission: 3/25/2024    Subjective     Interval History:   Follow-up acute kidney injury on CKD 2.  Mariscal catheter in place.  Urine output .  Blood pressure stable.  On IV fluid.  Hungry.  Denies shortness of breath.  Says he hurts all over.  His voice is barely a whisper.  Review of Systems:   As noted above    Objective     Vitals:   Temp:  [97.3 °F (36.3 °C)-98.8 °F (37.1 °C)] 98.2 °F (36.8 °C)  Heart Rate:  [70-92] 92  Resp:  [16-18] 18  BP: (105-139)/(57-67) 139/61    Intake/Output Summary (Last 24 hours) at 3/28/2024 1032  Last data filed at 3/28/2024 0659  Gross per 24 hour   Intake --   Output 2050 ml   Net -2050 ml       Physical Exam:    General Appearance: Cachectic.  Voice extremely weak.  Skin: warm and dry  HEENT: oral mucosa dry.  Edentulous.  Nonicteric sclera  Neck: supple, no JVD  Lungs: Clear to auscultation bilaterally.  Unlabored on room air.  No wheezing.  Heart: RRR, normal S1 and S2  Abdomen: soft, nontender, distended. +bs  : Mariscal catheter  Extremities: No edema.  Very poor muscle mass.      Scheduled Meds:     bisacodyl, 10 mg, Rectal, Once  finasteride, 5 mg, Oral, Daily  heparin (porcine), 5,000 Units, Subcutaneous, Q8H  Menthol-Zinc Oxide, 1 Application, Topical, Q12H  pantoprazole, 40 mg, Oral, Q AM  piperacillin-tazobactam, 3.375 g, Intravenous, Q8H  senna-docusate sodium, 2 tablet, Oral, BID   And  polyethylene glycol, 17 g, Oral, BID  tamsulosin, 0.4 mg, Oral, Nightly      IV Meds:   dextrose 5 % and sodium chloride 0.45 %, 75 mL/hr, Last Rate: 75 mL/hr (24 7265)        Results Reviewed:   I have personally reviewed the results from the time of this admission to 3/28/2024 10:32 EDT     Results from last 7 days   Lab Units 24  0757 24  0649 24  0406 24  1723   SODIUM mmol/L  153* 149* 138 140   POTASSIUM mmol/L 3.3* 3.8 4.8 4.9   CHLORIDE mmol/L 117* 113* 106 101   CO2 mmol/L 26.3 25.0 20.0* 24.0   BUN mg/dL 62* 80* 78* 82*   CREATININE mg/dL 1.86* 2.20* 2.30* 2.50*   CALCIUM mg/dL 8.5* 8.4* 8.9 9.7   BILIRUBIN mg/dL  --   --   --  0.6   ALK PHOS U/L  --   --   --  112   ALT (SGPT) U/L  --   --   --  15   AST (SGOT) U/L  --   --   --  16   GLUCOSE mg/dL 103* 124* 104* 125*       Estimated Creatinine Clearance: 34.5 mL/min (A) (by C-G formula based on SCr of 1.86 mg/dL (H)).    Results from last 7 days   Lab Units 03/28/24  0757 03/27/24  0649 03/26/24  1152   MAGNESIUM mg/dL 3.3* 2.9* 3.0*   PHOSPHORUS mg/dL 3.5 5.2* 5.6*             Results from last 7 days   Lab Units 03/28/24  0757 03/27/24  1015 03/27/24  0431 03/26/24  0406 03/25/24  1723   WBC 10*3/mm3 10.45 14.65* 10.90* 12.83* 11.85*   HEMOGLOBIN g/dL 10.6* 11.8* 7.1* 13.0 13.2   PLATELETS 10*3/mm3 478* 389 346 422 471*             Assessment / Plan     ASSESSMENT:  Acute kidney injury on CKD 2 with very poor muscle mass,  baseline creatinine 0.8-1.  MARTITA likely due to obstruction, volume depletion.  Creatinine has improved with IV fluids and placement of Mariscal catheter.  Nephrotic proteinuria on urine protein creatinine ratio in January.  Albumin 2.5.  Hypernatremic due to free water deficit.  Change IV fluids to D5W.  2.  E. coli urinary tract infection on Zosyn.  3.  BPH.  Mariscal catheter in place.  On Proscar and Flomax.  4.  Failure to thrive.  Declined feeding tube.  Concern for aspiration.  Video swallow ordered.  5.  Anemia.  PLAN:  He is definitely palliative care appropriate.  He has declined significantly since I saw him last in the hospital        In January.  Family reviewing goals of care.  2.  Change IV fluids to D5W at 125 cc/h.  3.  Continue Mariscal catheter.  4.  Replace potassium.  Thank you for involving us in the care of Anthony Gallegos.  Please feel free to call with any questions.    Tosha Cummings,  MD  03/28/24  10:32 EDT    [facility]  [phone]    Please note that portions of this note were completed with a voice recognition program.

## 2024-03-28 NOTE — CONSULTS
Colorectal & General Surgery  Consultation    Patient: Anthony Gallegos  YOB: 1945  MRN: [id number]      Assessment  Anthony Gallegos is a 79 y.o. male with urinary tract infection, MARTITA, encephalopathy, and failure to thrive with associated dysphagia.  I have reviewed the fluoroscopic video swallowing study.  Evidence of oropharyngeal aspiration.  The palliative care medicine services have spoken with his daughter who is his power of  about feeding tube placement.  They wish to proceed.  I discussed the role of esophagogastroduodenoscopy with percutaneous endoscopic gastrostomy tube placement, the risk, benefit, alternatives.  Given his colonic distention, we may not be able to perform a PEG if his colon is on the way.  Will proceed very cautiously with low threshold to convert to a laparoscopic approach or await resolution of his colonic dilation.  After thorough discussion of the risks, benefits, alternatives to the procedure, informed consent was obtained from his daughter, his POA.    Plan  Plan for EGD with PEG tomorrow  N.p.o.  Okay to give subcutaneous heparin      History of Present Illness   Anthony Gallegos is a 79 y.o. male who presents to the hospital with urinary tract and infection as well as encephalopathy superimposed on his failure to thrive and dementia.  We were asked to evaluate him for feeding tube access after he failed his swallow study today.  History obtained from his daughter.    Past Medical History   Past Medical History:   Diagnosis Date    Abscess of scrotum     MARTITA (acute kidney injury)     Altered mental state     Arthritis     AS (ankylosing spondylitis)     Hypertension     Leukocytosis     Tetrahydrocannabinol (THC) use disorder, mild, abuse 12/20/2023    Uveitis         Past Surgical History   Past Surgical History:   Procedure Laterality Date    COLONOSCOPY      ROTATOR CUFF REPAIR Right     TOTAL HIP ARTHROPLASTY Left 2014       Social History  Social History      Socioeconomic History    Marital status:     Number of children: 2   Tobacco Use    Smoking status: Never    Smokeless tobacco: Never   Vaping Use    Vaping status: Never Used   Substance and Sexual Activity    Alcohol use: No    Drug use: No    Sexual activity: Defer       Family History  Family History   Problem Relation Age of Onset    Alzheimer's disease Mother      Review of Systems  Negative except as documented in the HPI.     Allergies  No Known Allergies    Medications    Current Facility-Administered Medications:     acetaminophen (TYLENOL) tablet 650 mg, 650 mg, Oral, Q4H PRN, Casie Reardon MD    sennosides-docusate (PERICOLACE) 8.6-50 MG per tablet 2 tablet, 2 tablet, Oral, BID **AND** polyethylene glycol (MIRALAX) packet 17 g, 17 g, Oral, BID **AND** bisacodyl (DULCOLAX) EC tablet 5 mg, 5 mg, Oral, Daily PRN **AND** bisacodyl (DULCOLAX) suppository 10 mg, 10 mg, Rectal, Daily PRN, Nomi Osman MD    bisacodyl (DULCOLAX) suppository 10 mg, 10 mg, Rectal, Once, Nomi Osman MD    dextrose (D50W) (25 g/50 mL) IV injection 25 g, 25 g, Intravenous, Q15 Min PRN, Nomi Osman MD    dextrose (D5W) 5 % infusion, 125 mL/hr, Intravenous, Continuous, Tosha Cummings MD, Last Rate: 125 mL/hr at 03/28/24 1043, 125 mL/hr at 03/28/24 1043    dextrose (GLUTOSE) oral gel 15 g, 15 g, Oral, Q15 Min PRN, Nomi Osman MD    finasteride (PROSCAR) tablet 5 mg, 5 mg, Oral, Daily, Casie Reardon MD    glucagon (GLUCAGEN) injection 1 mg, 1 mg, Subcutaneous, Q15 Min PRN, Nomi Osman MD    heparin (porcine) 5000 UNIT/ML injection 5,000 Units, 5,000 Units, Subcutaneous, Q8H, Nomi Osman MD, 5,000 Units at 03/28/24 1518    melatonin tablet 3 mg, 3 mg, Oral, Nightly PRN, Stingl, Casie Araujo MD    Menthol-Zinc Oxide 1 Application, 1 Application, Topical, Q12H, Nomi Osman MD, 1 Application at 03/28/24 0910    ondansetron ODT (ZOFRAN-ODT)  disintegrating tablet 4 mg, 4 mg, Oral, Q6H PRN **OR** ondansetron (ZOFRAN) injection 4 mg, 4 mg, Intravenous, Q6H PRN, Casie Reardon MD    pantoprazole (PROTONIX) EC tablet 40 mg, 40 mg, Oral, Q AM, Casie Reardon MD    piperacillin-tazobactam (ZOSYN) 3.375 g IVPB in 100 mL NS MBP (CD), 3.375 g, Intravenous, Q8H, Nomi Osman MD, 3.375 g at 03/28/24 1132    potassium chloride 10 mEq in 100 mL IVPB, 10 mEq, Intravenous, Q1H, Tosha Cummings MD, Last Rate: 100 mL/hr at 03/28/24 1517, 10 mEq at 03/28/24 1517    [COMPLETED] Insert Peripheral IV, , , Once **AND** sodium chloride 0.9 % flush 10 mL, 10 mL, Intravenous, PRN, Ashleigh Early MD, 10 mL at 03/25/24 1719    tamsulosin (FLOMAX) 24 hr capsule 0.4 mg, 0.4 mg, Oral, Nightly, Casie Reardon MD    Vital Signs  Vitals:    03/28/24 1111   BP: 115/63   Pulse: 79   Resp: 16   Temp: 98 °F (36.7 °C)   SpO2: 99%          Physical Exam  Constitutional: Resting comfortably, no acute distress  Neck: Supple, trachea midline  Respiratory: No increased work of breathing, Symmetric excursion  Cardiovascular: Well pefursed, no jugular venous distention evident   Abdominal:  Soft, non-tender, mildly distended  Lymphatics: No cervical or suprascapular adenopathy  Skin: Warm, dry, no rash on visualized skin surfaces  Musculoskeletal: Symmetric strength, no obvious gross abnormalities    Laboratory Results  I have personally reviewed CBC with WC 10, hemoglobin 10, platelets 470.  BMP with creatinine 126, magnesium 3.3, phosphorus 3.5, potassium 3.3, bicarb 26.    Radiology  I have personally reviewed CT scan of the abdomen pelvis demonstrates significant colonic dilation and a decompressed stomach.       Chintan Broderick MD  Colorectal & General Surgery  [facility]    [address], [address]  [place], [place], [address]  P: [phone]  F: [phone]

## 2024-03-28 NOTE — PLAN OF CARE
Problem: Adult Inpatient Plan of Care  Goal: Absence of Hospital-Acquired Illness or Injury  Intervention: Identify and Manage Fall Risk  Recent Flowsheet Documentation  Taken 3/28/2024 0621 by Pamella Ríos RN  Safety Promotion/Fall Prevention:   activity supervised   assistive device/personal items within reach   clutter free environment maintained   safety round/check completed   nonskid shoes/slippers when out of bed  Taken 3/28/2024 0400 by Pamella Ríos RN  Safety Promotion/Fall Prevention:   activity supervised   assistive device/personal items within reach   clutter free environment maintained   safety round/check completed   nonskid shoes/slippers when out of bed  Taken 3/28/2024 0200 by Pamella Ríos RN  Safety Promotion/Fall Prevention:   activity supervised   assistive device/personal items within reach   clutter free environment maintained   safety round/check completed   nonskid shoes/slippers when out of bed  Taken 3/28/2024 0000 by Pamella Ríos RN  Safety Promotion/Fall Prevention:   activity supervised   assistive device/personal items within reach   clutter free environment maintained   safety round/check completed   nonskid shoes/slippers when out of bed  Taken 3/27/2024 2200 by Pamella Ríos RN  Safety Promotion/Fall Prevention:   activity supervised   assistive device/personal items within reach   clutter free environment maintained   safety round/check completed   nonskid shoes/slippers when out of bed  Taken 3/27/2024 2010 by Pamella Ríos RN  Safety Promotion/Fall Prevention:   activity supervised   assistive device/personal items within reach   clutter free environment maintained   safety round/check completed   nonskid shoes/slippers when out of bed  Intervention: Prevent Skin Injury  Recent Flowsheet Documentation  Taken 3/28/2024 0621 by Pamella Ríos RN  Body Position: position changed independently   Goal Outcome Evaluation:

## 2024-03-28 NOTE — MBS/VFSS/FEES
Acute Care - Speech Language Pathology   Swallow Initial Evaluation [facility]     Patient Name: Anthony Gallegos  : 1945  MRN: [id number]  Today's Date: 3/28/2024               Admit Date: 3/25/2024    Visit Dx:     ICD-10-CM ICD-9-CM   1. Decreased oral intake  R63.8 783.9   2. MARTITA (acute kidney injury)  N17.9 584.9   3. Whole body pain  R52 780.96   4. Dysphagia, unspecified type  R13.10 787.20   5. Dehydration  E86.0 276.51   6. Elevated troponin  R79.89 790.6   7. Failure to thrive in adult  R62.7 783.7   8. Generalized weakness  R53.1 780.79   9. Weight loss  R63.4 783.21   10. Urinary tract infection in male  N39.0 599.0     Patient Active Problem List   Diagnosis    Ankylosing spondylitis of cervical region    Recent unexplained weight loss    Abnormal serum lipase level    Abnormal serum level of amylase    Hypertension    Boil    Immobility    Fall from bed    Tetrahydrocannabinol (THC) use disorder, mild, abuse    Generalized pain        Grief    DISH (disseminated idiopathic skeletal hyperostosis)    Generalized weakness    Severe malnutrition    Altered mental status    Transient alteration of awareness    GERD without esophagitis    BPH (benign prostatic hyperplasia)    UTI (urinary tract infection)    Dehydration    MARTITA (acute kidney injury)    Hyperglycemia    Encephalopathy     Past Medical History:   Diagnosis Date    Abscess of scrotum     MARTITA (acute kidney injury)     Altered mental state     Arthritis     AS (ankylosing spondylitis)     Hypertension     Leukocytosis     Tetrahydrocannabinol (THC) use disorder, mild, abuse 2023    Uveitis      Past Surgical History:   Procedure Laterality Date    COLONOSCOPY      ROTATOR CUFF REPAIR Right     TOTAL HIP ARTHROPLASTY Left        SLP Recommendation and Plan  SLP Swallowing Diagnosis: severe, profound, oral dysphagia, pharyngeal dysphagia (24 1100)  SLP Diet Recommendation: NPO, ice chips between meals after oral care,  with supervision, long term alternate methods of nutrition/hydration (03/28/24 1100)     SLP Rec. for Method of Medication Administration: meds via alternate route (03/28/24 1100)     Monitor for Signs of Aspiration: yes, notify SLP if any concerns (03/28/24 1100)  Recommended Diagnostics: other (see comments) (s/p minimum 8 weeks therapy pending GOC) (03/28/24 1100)  Swallow Criteria for Skilled Therapeutic Interventions Met: baseline status, demonstrates skilled criteria, other (see comments) (trial therapy pending GOC) (03/28/24 1100)     Rehab Potential/Prognosis, Swallowing: re-evaluate goals as necessary (03/28/24 1100)  Therapy Frequency (Swallow): PRN (03/28/24 1100)  Predicted Duration Therapy Intervention (Days): until discharge (03/28/24 1100)  Oral Care Recommendations: Oral Care BID/PRN, Before ice/water (03/28/24 1100)                                        Plan of Care Reviewed With: patient  Outcome Evaluation: VFSS completed. Recommend NPO versus long term alternate nutrition. Recommend meds alternate route. Patient with diffuse severe-profound generalized pharyngeal weakness. Silent aspiration observed, at risk for aspiration of all PO secondary to inability to clear diffuse mild-moderate pharyngeal residue. Pending goals of care, recommend long term alternate nutrition with long term dysphagia therapy 8 weeks minimum prior to repeat instrumental evaluation. Patient unlikely to progress with cortrak/temporary means of alternate nutrition due to severity of swallow function. Concern PO for quality of life could be uncomfortable secondary to amount of residue and delayed throat clearing and repeat swallows over time in attempt to clear. If patient desires PO for QOL, SLP does not deem one consistency safer than another secondary to risk of aspiration from residue. SLP to follow peripherally for further needs.      SWALLOW EVALUATION (Last 72 Hours)       SLP Adult Swallow Evaluation       Row Name  03/28/24 1100 03/27/24 1601 03/26/24 1000             Rehab Evaluation    Document Type evaluation  -OC evaluation  -OC evaluation  -CR      Subjective Information no complaints  -OC no complaints  -OC no complaints  -CR      Patient Observations alert;cooperative;agree to therapy  -OC alert;cooperative;agree to therapy  -OC cooperative  -CR      Patient Effort good  -OC good  -OC adequate  -CR      Symptoms Noted During/After Treatment none  -OC none  -OC none  -CR         General Information    Patient Profile Reviewed yes  -OC yes  -OC yes  -CR      Pertinent History Of Current Problem -- -- 80 y/o male with UTI, poor PO intake, weakness, and significant weight loss. Previous admission patient on mechanical ground with thin liquids, no straws.  -CR      Current Method of Nutrition NPO  -OC NPO  -OC NPO  -CR      Precautions/Limitations, Vision difficult to assess  -OC difficult to assess  -OC difficult to assess  -CR      Precautions/Limitations, Hearing WFL;for purposes of eval  -OC WFL;for purposes of eval  -OC WFL;for purposes of eval  -CR      Prior Level of Function-Communication unknown  -OC unknown  -OC --      Prior Level of Function-Swallowing soft to chew;thin liquids;other (see comments)  -OC soft to chew;thin liquids;other (see comments)  minimal/no intake reported  -OC soft to chew;thin liquids  -CR      Plans/Goals Discussed with patient;agreed upon  -OC patient;agreed upon  -OC patient;agreed upon  -CR      Barriers to Rehab medically complex  -OC medically complex  -OC --      Patient's Goals for Discharge patient did not state  -OC patient did not state  -OC --         Pain    Additional Documentation -- Pain Scale: Numbers Pre/Post-Treatment (Group)  -OC --         Pain Scale: Numbers Pre/Post-Treatment    Pretreatment Pain Rating 0/10 - no pain  -OC 0/10 - no pain  -OC --      Posttreatment Pain Rating 0/10 - no pain  -OC 0/10 - no pain  -OC --         Oral Motor Structure and Function     "Dentition Assessment edentulous  -OC edentulous  -OC edentulous  -CR      Secretion Management WNL/WFL  -OC WNL/WFL  -OC dried secretions in oral cavity  -CR      Mucosal Quality dry  -OC dry  -OC dry  -CR         Oral Musculature and Cranial Nerve Assessment    Oral Motor General Assessment generalized oral motor weakness  -OC generalized oral motor weakness  -OC generalized oral motor weakness  -CR      Vocal Impairment, Detail. Cranial Nerve X (Vagus) -- -- vocal quality abnormality (see comments)  difficult to describe; mostly grunting during eval  -CR         Clinical Swallow Eval    Clinical Swallow Evaluation Summary -- Re-eval of swallow completed. Patient demonstrated no overt s/s aspiration with ice chips. Patient demonstrated audible swallow with all liquids. 5-6 swallows per bolus thins. 3 swallows per bolus nectar thick and honey thick liquids. Delayed wet vocal quality after intial trial puree, suspect residue of liquid impacting vocal quality. Multiple swallows per bolus puree. Consistent belching s/p all PO, intermittent change in vocal quality.  -OC Clinical swallow evaluation completed. Vocal quality difficult to assess; mostly grunting during eval. Patient with coughing after x3/3 puree trials. Audible swallows with thin liquid by cup/straw. No overt s/s of aspiration with ice chips. Patient stated he was \"full\" and wanted to discontinue trials following thin and puree trials. Recommend NPO. Meds via alternate route. Ice chips OK after oral care. Consider goals of care discussion. Speech to follow for re-eval.  -CR         MBS/VFSS Interpretation    VFSS Summary VFSS completed, Kristal SONG present. Patient presents with severe oropharyngeal dysphagia. Patient demonstrated severe-profound generalized pharyngeal weakness, decreased coordination/timing, and decreased glottic closure. Patient demonstrated silent aspiration of thin liquids before, during, and after the swallow. Moderate residue of " thin pooling in pharynx after 6+ swallows, incomplete swallows in attempt to clear residue. Unable to clear aspirated and penetrated materials with cued cough/throat clear. Patient demonstrated aspiration of residue with honey thick liquids via spoon/cup, risk of aspiration residue with nectar thick liquids. Multiple swallows with nectar thick, honey thick, puree. Incomplete bolus clearance and moderate residue after the swallow, incomplete swallows in attempts to clear residue.  -OC -- --         SLP Communication to Radiology    Summary Statement VFSS completed, Kristal SONG present. Patient presents with severe oropharyngeal dysphagia. Patient demonstrated severe-profound generalized pharyngeal weakness, decreased coordination/timing, and decreased glottic closure. Patient demonstrated silent aspiration of thin liquids before, during, and after the swallow. Moderate residue of thin pooling in pharynx after 6+ swallows, incomplete swallows in attempt to clear residue. Unable to clear aspirated and penetrated materials with cued cough/throat clear. Patient demonstrated aspiration of residue with honey thick liquids via spoon/cup, risk of aspiration residue with nectar thick liquids. Multiple swallows with nectar thick, honey thick, puree. Incomplete bolus clearance and moderate residue after the swallow, incomplete swallows in attempts to clear residue.  -OC -- --         SLP Evaluation Clinical Impression    SLP Swallowing Diagnosis severe;profound;oral dysphagia;pharyngeal dysphagia  -OC suspected pharyngeal dysphagia  -OC suspected pharyngeal dysphagia  -CR      Functional Impact risk of aspiration/pneumonia  -OC risk of aspiration/pneumonia  -OC risk of aspiration/pneumonia  -CR      Rehab Potential/Prognosis, Swallowing re-evaluate goals as necessary  -OC re-evaluate goals as necessary  -OC re-evaluate goals as necessary  -CR      Swallow Criteria for Skilled Therapeutic Interventions Met baseline  status;demonstrates skilled criteria;other (see comments)  trial therapy pending GOC  -OC no significant expected improvement in functional status  -OC no significant expected improvement in functional status  -CR         Recommendations    Therapy Frequency (Swallow) PRN  -OC PRN  -OC PRN  -CR      Predicted Duration Therapy Intervention (Days) until discharge  -OC until discharge  -OC until discharge  -CR      SLP Diet Recommendation NPO;ice chips between meals after oral care, with supervision;long term alternate methods of nutrition/hydration  -OC NPO;ice chips between meals after oral care, with supervision  -OC NPO;ice chips between meals after oral care, with supervision  -CR      Recommended Diagnostics other (see comments)  s/p minimum 8 weeks therapy pending GOC  -OC reassess via VFSS (MBS)  -OC reassess via clinical swallow evaluation  -CR      Oral Care Recommendations Oral Care BID/PRN;Before ice/water  -OC Oral Care BID/PRN;Before ice/water  -OC Oral Care BID/PRN;Before ice/water  -CR      SLP Rec. for Method of Medication Administration meds via alternate route  -OC meds via alternate route  -OC meds via alternate route  -CR      Monitor for Signs of Aspiration yes;notify SLP if any concerns  -OC yes;notify SLP if any concerns  -OC yes;notify SLP if any concerns  -CR         Swallow Goals (SLP)    Swallow LTGs -- -- Patient will demonstrate functional swallow for  -CR         (LTG) Patient will demonstrate functional swallow for    Diet Texture (Demonstrate functional swallow) -- -- pureed textures  -CR      Liquid viscosity (Demonstrate functional swallow) -- -- honey/  moderately thick liquids  -CR      [place] (Demonstrate functional swallow) -- -- independently (over 90% accuracy)  -CR      Time Frame (Demonstrate functional swallow) -- -- by discharge  -CR      Progress/Outcomes (Demonstrate functional swallow) goal no longer appropriate  -OC -- new goal  -CR                User Key  (r) =  Recorded By, (t) = Taken By, (c) = Cosigned By      Initials Name Effective Dates    Katalina Moeller SLP 08/28/23 -     CR Linda Oliver SLP 08/28/23 -                     EDUCATION  The patient has been educated in the following areas:   Dysphagia (Swallowing Impairment).        SLP GOALS       Row Name 03/28/24 1100 03/26/24 1000          (LTG) Patient will demonstrate functional swallow for    Diet Texture (Demonstrate functional swallow) -- pureed textures  -CR     Liquid viscosity (Demonstrate functional swallow) -- honey/  moderately thick liquids  -CR     [place] (Demonstrate functional swallow) -- independently (over 90% accuracy)  -CR     Time Frame (Demonstrate functional swallow) -- by discharge  -CR     Progress/Outcomes (Demonstrate functional swallow) goal no longer appropriate  -OC new goal  -CR               User Key  (r) = Recorded By, (t) = Taken By, (c) = Cosigned By      Initials Name Provider Type    Katalina Moeller SLP Speech and Language Pathologist    Linda Laurent SLP Speech and Language Pathologist                       Time Calculation:    Time Calculation- SLP       Row Name 03/28/24 1240             Time Calculation- SLP    SLP Start Time 1000  -OC      SLP Received On 03/28/24  -OC         Untimed Charges    SLP Eval/Re-eval  [facility] [employer] Fluoro Eval Swallow - 08625  -OC      32790-FX Motion Fluoro Eval Swallow Minutes 90  -OC         Total Minutes    Untimed Charges Total Minutes 90  -OC       Total Minutes 90  -OC                User Key  (r) = Recorded By, (t) = Taken By, (c) = Cosigned By      Initials Name Provider Type    Katalina Moeller SLP Speech and Language Pathologist                    Therapy Charges for Today       Code Description Service Date Service Provider Modifiers Qty    69152736508 HC ST TREATMENT SWALLOW 5 3/27/2024 Katalina Quintana SLP GN 1    35509649134 HC ST MOTION FLUORO EVAL SWALLOW 6 3/28/2024 Katalina Quintana SLP GN 1                 Katalina  Lilian, SLP  3/28/2024

## 2024-03-28 NOTE — PROGRESS NOTES
Enter Query Response Below      Query Response: Sepsis was not supported             If applicable, please update the problem list.       Patient: Anthony Gallegos        : 1945  Account: [id number]           Admit Date:         How to Respond to this query:       a. Click New Note     b. Answer query within the yellow box.                c. Update the Problem List, if applicable.      If you have any questions about this query contact me at: Sincerely,    Enoc Finley RN, BSN  Clinical Documentation Integrity  [facility]  [email or web address]       ,     79 year old male with history of chronic urinary retention presented with weakness and is found to have UTI with sepsis noted in the H/P, diagnosis not carried into the concurrent progress notes.    -Vital signs on admission were temp. 98.3, heart rate 84, RR 16, /63, O2 sat. 98%.      -Labs on day of admission were WBC 11.85, lactate 2.4, creatinine 2.50, procalcitonin 3.36, blood sugar 125 (no history of diabetes).    -Patient is being treated with intravenous fluids, antibiotics.    After study, was sepsis clinically supported during this admission?    Sepsis was supported with additional clinical indicators:____________  Sepsis was not supported  Other- specify_____________  Unable to determine      By submitting this query, we are merely seeking further clarification of documentation to accurately reflect all conditions that you are monitoring, evaluating, treating or that extend the hospitalization or utilize additional resources of care. Please utilize your independent clinical judgment when addressing the question(s) above.     This query and your response, once completed, will be entered into the legal medical record.    Sincerely,  Enoc Finley  Clinical Documentation Integrity [facility]

## 2024-03-29 ENCOUNTER — ANESTHESIA EVENT (OUTPATIENT)
Dept: GASTROENTEROLOGY | Facility: HOSPITAL | Age: 79
End: 2024-03-29
Payer: MEDICARE

## 2024-03-29 ENCOUNTER — ANESTHESIA (OUTPATIENT)
Dept: GASTROENTEROLOGY | Facility: HOSPITAL | Age: 79
End: 2024-03-29
Payer: MEDICARE

## 2024-03-29 ENCOUNTER — APPOINTMENT (OUTPATIENT)
Dept: MRI IMAGING | Facility: HOSPITAL | Age: 79
DRG: 689 | End: 2024-03-29
Payer: MEDICARE

## 2024-03-29 LAB
ALBUMIN SERPL-MCNC: 2.5 G/DL (ref 3.5–5.2)
ALBUMIN/GLOB SERPL: 0.4 G/DL
ALP SERPL-CCNC: 84 U/L (ref 39–117)
ALT SERPL W P-5'-P-CCNC: 10 U/L (ref 1–41)
ANION GAP SERPL CALCULATED.3IONS-SCNC: 9.9 MMOL/L (ref 5–15)
AST SERPL-CCNC: 25 U/L (ref 1–40)
BASOPHILS # BLD AUTO: 0.05 10*3/MM3 (ref 0–0.2)
BASOPHILS NFR BLD AUTO: 0.4 % (ref 0–1.5)
BILIRUB SERPL-MCNC: 0.7 MG/DL (ref 0–1.2)
BUN SERPL-MCNC: 50 MG/DL (ref 8–23)
BUN/CREAT SERPL: 29.6 (ref 7–25)
CALCIUM SPEC-SCNC: 8.5 MG/DL (ref 8.6–10.5)
CHLORIDE SERPL-SCNC: 118 MMOL/L (ref 98–107)
CO2 SERPL-SCNC: 23.1 MMOL/L (ref 22–29)
CREAT SERPL-MCNC: 1.69 MG/DL (ref 0.76–1.27)
DEPRECATED RDW RBC AUTO: 40.2 FL (ref 37–54)
EGFRCR SERPLBLD CKD-EPI 2021: 40.5 ML/MIN/1.73
EOSINOPHIL # BLD AUTO: 0.2 10*3/MM3 (ref 0–0.4)
EOSINOPHIL NFR BLD AUTO: 1.8 % (ref 0.3–6.2)
ERYTHROCYTE [DISTWIDTH] IN BLOOD BY AUTOMATED COUNT: 13.1 % (ref 12.3–15.4)
FERRITIN SERPL-MCNC: 2394 NG/ML (ref 30–400)
FOLATE SERPL-MCNC: 9.23 NG/ML (ref 4.78–24.2)
GLOBULIN UR ELPH-MCNC: 5.8 GM/DL
GLUCOSE BLDC GLUCOMTR-MCNC: 56 MG/DL (ref 70–130)
GLUCOSE BLDC GLUCOMTR-MCNC: 81 MG/DL (ref 70–130)
GLUCOSE BLDC GLUCOMTR-MCNC: 86 MG/DL (ref 70–130)
GLUCOSE BLDC GLUCOMTR-MCNC: 91 MG/DL (ref 70–130)
GLUCOSE BLDC GLUCOMTR-MCNC: 91 MG/DL (ref 70–130)
GLUCOSE BLDC GLUCOMTR-MCNC: 93 MG/DL (ref 70–130)
GLUCOSE BLDC GLUCOMTR-MCNC: 95 MG/DL (ref 70–130)
GLUCOSE BLDC GLUCOMTR-MCNC: 99 MG/DL (ref 70–130)
GLUCOSE SERPL-MCNC: 126 MG/DL (ref 65–99)
HCT VFR BLD AUTO: 34.8 % (ref 37.5–51)
HGB BLD-MCNC: 11.4 G/DL (ref 13–17.7)
IMM GRANULOCYTES # BLD AUTO: 0.08 10*3/MM3 (ref 0–0.05)
IMM GRANULOCYTES NFR BLD AUTO: 0.7 % (ref 0–0.5)
IRON 24H UR-MRATE: 74 MCG/DL (ref 59–158)
IRON SATN MFR SERPL: 48 % (ref 20–50)
LYMPHOCYTES # BLD AUTO: 1.95 10*3/MM3 (ref 0.7–3.1)
LYMPHOCYTES NFR BLD AUTO: 17.4 % (ref 19.6–45.3)
MAGNESIUM SERPL-MCNC: 2.7 MG/DL (ref 1.6–2.4)
MCH RBC QN AUTO: 27.9 PG (ref 26.6–33)
MCHC RBC AUTO-ENTMCNC: 32.8 G/DL (ref 31.5–35.7)
MCV RBC AUTO: 85.1 FL (ref 79–97)
MONOCYTES # BLD AUTO: 0.79 10*3/MM3 (ref 0.1–0.9)
MONOCYTES NFR BLD AUTO: 7.1 % (ref 5–12)
NEUTROPHILS NFR BLD AUTO: 72.6 % (ref 42.7–76)
NEUTROPHILS NFR BLD AUTO: 8.11 10*3/MM3 (ref 1.7–7)
NRBC BLD AUTO-RTO: 0 /100 WBC (ref 0–0.2)
PHOSPHATE SERPL-MCNC: 2.9 MG/DL (ref 2.5–4.5)
PLATELET # BLD AUTO: 481 10*3/MM3 (ref 140–450)
PMV BLD AUTO: 8.9 FL (ref 6–12)
POTASSIUM SERPL-SCNC: 3.4 MMOL/L (ref 3.5–5.2)
PROT SERPL-MCNC: 8.3 G/DL (ref 6–8.5)
RBC # BLD AUTO: 4.09 10*6/MM3 (ref 4.14–5.8)
SODIUM SERPL-SCNC: 151 MMOL/L (ref 136–145)
TIBC SERPL-MCNC: 153 MCG/DL (ref 298–536)
TRANSFERRIN SERPL-MCNC: 103 MG/DL (ref 200–360)
VIT B12 BLD-MCNC: >2000 PG/ML (ref 211–946)
WBC NRBC COR # BLD AUTO: 11.18 10*3/MM3 (ref 3.4–10.8)

## 2024-03-29 PROCEDURE — 0 DEXTROSE 5 % SOLUTION: Performed by: INTERNAL MEDICINE

## 2024-03-29 PROCEDURE — 25010000002 HEPARIN (PORCINE) PER 1000 UNITS: Performed by: SURGERY

## 2024-03-29 PROCEDURE — 25010000002 PHENYLEPHRINE 10 MG/ML SOLUTION

## 2024-03-29 PROCEDURE — 85025 COMPLETE CBC W/AUTO DIFF WBC: CPT | Performed by: STUDENT IN AN ORGANIZED HEALTH CARE EDUCATION/TRAINING PROGRAM

## 2024-03-29 PROCEDURE — 82746 ASSAY OF FOLIC ACID SERUM: CPT | Performed by: STUDENT IN AN ORGANIZED HEALTH CARE EDUCATION/TRAINING PROGRAM

## 2024-03-29 PROCEDURE — 25810000003 LACTATED RINGERS PER 1000 ML

## 2024-03-29 PROCEDURE — 82728 ASSAY OF FERRITIN: CPT | Performed by: STUDENT IN AN ORGANIZED HEALTH CARE EDUCATION/TRAINING PROGRAM

## 2024-03-29 PROCEDURE — 82607 VITAMIN B-12: CPT | Performed by: STUDENT IN AN ORGANIZED HEALTH CARE EDUCATION/TRAINING PROGRAM

## 2024-03-29 PROCEDURE — 83540 ASSAY OF IRON: CPT | Performed by: STUDENT IN AN ORGANIZED HEALTH CARE EDUCATION/TRAINING PROGRAM

## 2024-03-29 PROCEDURE — 97110 THERAPEUTIC EXERCISES: CPT

## 2024-03-29 PROCEDURE — 82948 REAGENT STRIP/BLOOD GLUCOSE: CPT

## 2024-03-29 PROCEDURE — 43246 EGD PLACE GASTROSTOMY TUBE: CPT | Performed by: SURGERY

## 2024-03-29 PROCEDURE — 83735 ASSAY OF MAGNESIUM: CPT | Performed by: STUDENT IN AN ORGANIZED HEALTH CARE EDUCATION/TRAINING PROGRAM

## 2024-03-29 PROCEDURE — 25010000002 PROPOFOL 10 MG/ML EMULSION

## 2024-03-29 PROCEDURE — 25010000002 PIPERACILLIN SOD-TAZOBACTAM PER 1 G: Performed by: STUDENT IN AN ORGANIZED HEALTH CARE EDUCATION/TRAINING PROGRAM

## 2024-03-29 PROCEDURE — S0260 H&P FOR SURGERY: HCPCS | Performed by: SURGERY

## 2024-03-29 PROCEDURE — 84466 ASSAY OF TRANSFERRIN: CPT | Performed by: STUDENT IN AN ORGANIZED HEALTH CARE EDUCATION/TRAINING PROGRAM

## 2024-03-29 PROCEDURE — 70551 MRI BRAIN STEM W/O DYE: CPT

## 2024-03-29 PROCEDURE — 80053 COMPREHEN METABOLIC PANEL: CPT | Performed by: INTERNAL MEDICINE

## 2024-03-29 PROCEDURE — 0DH63UZ INSERTION OF FEEDING DEVICE INTO STOMACH, PERCUTANEOUS APPROACH: ICD-10-PCS | Performed by: SURGERY

## 2024-03-29 PROCEDURE — 25810000003 SODIUM CHLORIDE 0.9 % SOLUTION: Performed by: SURGERY

## 2024-03-29 PROCEDURE — 84100 ASSAY OF PHOSPHORUS: CPT | Performed by: INTERNAL MEDICINE

## 2024-03-29 PROCEDURE — 25010000002 ERTAPENEM PER 500 MG: Performed by: STUDENT IN AN ORGANIZED HEALTH CARE EDUCATION/TRAINING PROGRAM

## 2024-03-29 PROCEDURE — 25010000002 HEPARIN (PORCINE) PER 1000 UNITS: Performed by: STUDENT IN AN ORGANIZED HEALTH CARE EDUCATION/TRAINING PROGRAM

## 2024-03-29 PROCEDURE — 99223 1ST HOSP IP/OBS HIGH 75: CPT | Performed by: STUDENT IN AN ORGANIZED HEALTH CARE EDUCATION/TRAINING PROGRAM

## 2024-03-29 RX ORDER — PROPOFOL 10 MG/ML
VIAL (ML) INTRAVENOUS AS NEEDED
Status: DISCONTINUED | OUTPATIENT
Start: 2024-03-29 | End: 2024-03-29 | Stop reason: SURG

## 2024-03-29 RX ORDER — BISACODYL 10 MG
10 SUPPOSITORY, RECTAL RECTAL DAILY PRN
Status: DISCONTINUED | OUTPATIENT
Start: 2024-03-29 | End: 2024-03-31

## 2024-03-29 RX ORDER — AMOXICILLIN 250 MG
2 CAPSULE ORAL 2 TIMES DAILY
Status: DISCONTINUED | OUTPATIENT
Start: 2024-03-29 | End: 2024-03-31

## 2024-03-29 RX ORDER — SODIUM CHLORIDE, SODIUM LACTATE, POTASSIUM CHLORIDE, CALCIUM CHLORIDE 600; 310; 30; 20 MG/100ML; MG/100ML; MG/100ML; MG/100ML
INJECTION, SOLUTION INTRAVENOUS CONTINUOUS PRN
Status: DISCONTINUED | OUTPATIENT
Start: 2024-03-29 | End: 2024-03-29 | Stop reason: SURG

## 2024-03-29 RX ORDER — PHENYLEPHRINE HYDROCHLORIDE 10 MG/ML
INJECTION INTRAVENOUS AS NEEDED
Status: DISCONTINUED | OUTPATIENT
Start: 2024-03-29 | End: 2024-03-29 | Stop reason: SURG

## 2024-03-29 RX ORDER — BISACODYL 5 MG/1
5 TABLET, DELAYED RELEASE ORAL DAILY PRN
Status: DISCONTINUED | OUTPATIENT
Start: 2024-03-29 | End: 2024-03-31

## 2024-03-29 RX ORDER — POLYETHYLENE GLYCOL 3350 17 G/17G
17 POWDER, FOR SOLUTION ORAL DAILY
Status: DISCONTINUED | OUTPATIENT
Start: 2024-03-29 | End: 2024-03-30

## 2024-03-29 RX ORDER — SODIUM CHLORIDE 9 MG/ML
1000 INJECTION, SOLUTION INTRAVENOUS CONTINUOUS
Status: DISCONTINUED | OUTPATIENT
Start: 2024-03-29 | End: 2024-03-29

## 2024-03-29 RX ORDER — DEXTROSE MONOHYDRATE 50 MG/ML
75 INJECTION, SOLUTION INTRAVENOUS CONTINUOUS
Status: ACTIVE | OUTPATIENT
Start: 2024-03-29 | End: 2024-03-30

## 2024-03-29 RX ORDER — LIDOCAINE HYDROCHLORIDE 20 MG/ML
INJECTION, SOLUTION INFILTRATION; PERINEURAL AS NEEDED
Status: DISCONTINUED | OUTPATIENT
Start: 2024-03-29 | End: 2024-03-29 | Stop reason: SURG

## 2024-03-29 RX ORDER — POTASSIUM CHLORIDE 1.5 G/1.58G
40 POWDER, FOR SOLUTION ORAL ONCE
Status: COMPLETED | OUTPATIENT
Start: 2024-03-29 | End: 2024-03-29

## 2024-03-29 RX ADMIN — SODIUM CHLORIDE 1000 ML: 9 INJECTION, SOLUTION INTRAVENOUS at 06:30

## 2024-03-29 RX ADMIN — HEPARIN SODIUM 5000 UNITS: 5000 INJECTION INTRAVENOUS; SUBCUTANEOUS at 16:43

## 2024-03-29 RX ADMIN — DEXTROSE MONOHYDRATE 75 ML/HR: 50 INJECTION, SOLUTION INTRAVENOUS at 22:26

## 2024-03-29 RX ADMIN — PHENYLEPHRINE HYDROCHLORIDE 100 MCG: 10 INJECTION INTRAVENOUS at 07:14

## 2024-03-29 RX ADMIN — HEPARIN SODIUM 5000 UNITS: 5000 INJECTION INTRAVENOUS; SUBCUTANEOUS at 00:10

## 2024-03-29 RX ADMIN — DOCUSATE SODIUM 50MG AND SENNOSIDES 8.6MG 2 TABLET: 8.6; 5 TABLET, FILM COATED ORAL at 16:43

## 2024-03-29 RX ADMIN — POTASSIUM CHLORIDE 40 MEQ: 1.5 POWDER, FOR SOLUTION ORAL at 22:25

## 2024-03-29 RX ADMIN — ERTAPENEM SODIUM 1000 MG: 1 INJECTION, POWDER, LYOPHILIZED, FOR SOLUTION INTRAMUSCULAR; INTRAVENOUS at 16:43

## 2024-03-29 RX ADMIN — POLYETHYLENE GLYCOL 3350 17 G: 17 POWDER, FOR SOLUTION ORAL at 11:42

## 2024-03-29 RX ADMIN — SODIUM CHLORIDE, POTASSIUM CHLORIDE, SODIUM LACTATE AND CALCIUM CHLORIDE: 600; 310; 30; 20 INJECTION, SOLUTION INTRAVENOUS at 06:57

## 2024-03-29 RX ADMIN — LIDOCAINE HYDROCHLORIDE 60 MG: 20 INJECTION, SOLUTION INFILTRATION; PERINEURAL at 07:03

## 2024-03-29 RX ADMIN — HEPARIN SODIUM 5000 UNITS: 5000 INJECTION INTRAVENOUS; SUBCUTANEOUS at 05:00

## 2024-03-29 RX ADMIN — PHENYLEPHRINE HYDROCHLORIDE 100 MCG: 10 INJECTION INTRAVENOUS at 07:18

## 2024-03-29 RX ADMIN — DEXTROSE MONOHYDRATE 125 ML/HR: 50 INJECTION, SOLUTION INTRAVENOUS at 04:00

## 2024-03-29 RX ADMIN — HEPARIN SODIUM 5000 UNITS: 5000 INJECTION INTRAVENOUS; SUBCUTANEOUS at 22:25

## 2024-03-29 RX ADMIN — PHENYLEPHRINE HYDROCHLORIDE 100 MCG: 10 INJECTION INTRAVENOUS at 07:13

## 2024-03-29 RX ADMIN — PIPERACILLIN AND TAZOBACTAM 3.38 G: 3; .375 INJECTION, POWDER, FOR SOLUTION INTRAVENOUS at 11:42

## 2024-03-29 RX ADMIN — PHENYLEPHRINE HYDROCHLORIDE 100 MCG: 10 INJECTION INTRAVENOUS at 07:07

## 2024-03-29 RX ADMIN — PIPERACILLIN AND TAZOBACTAM 3.38 G: 3; .375 INJECTION, POWDER, FOR SOLUTION INTRAVENOUS at 04:00

## 2024-03-29 RX ADMIN — PROPOFOL 40 MG: 10 INJECTION, EMULSION INTRAVENOUS at 07:03

## 2024-03-29 RX ADMIN — ANORECTAL OINTMENT 1 APPLICATION: 15.7; .44; 24; 20.6 OINTMENT TOPICAL at 00:11

## 2024-03-29 RX ADMIN — DOCUSATE SODIUM 50MG AND SENNOSIDES 8.6MG 2 TABLET: 8.6; 5 TABLET, FILM COATED ORAL at 08:54

## 2024-03-29 RX ADMIN — DEXTROSE MONOHYDRATE 25 G: 25 INJECTION, SOLUTION INTRAVENOUS at 13:21

## 2024-03-29 RX ADMIN — ANORECTAL OINTMENT 1 APPLICATION: 15.7; .44; 24; 20.6 OINTMENT TOPICAL at 11:43

## 2024-03-29 RX ADMIN — TAMSULOSIN HYDROCHLORIDE 0.4 MG: 0.4 CAPSULE ORAL at 22:25

## 2024-03-29 RX ADMIN — ANORECTAL OINTMENT 1 APPLICATION: 15.7; .44; 24; 20.6 OINTMENT TOPICAL at 22:25

## 2024-03-29 RX ADMIN — PROPOFOL 140 MCG/KG/MIN: 10 INJECTION, EMULSION INTRAVENOUS at 07:04

## 2024-03-29 RX ADMIN — FINASTERIDE 5 MG: 5 TABLET, FILM COATED ORAL at 08:54

## 2024-03-29 NOTE — PROGRESS NOTES
Colorectal & General Surgery  Progress Note    Patient: Anthony Gallegos  YOB: 1945  MRN: [id number]      Assessment  Anthony Gallegos is a 79 y.o. male with dementia, urinary tract infection, MARTITA, encephalopathy, and failure to thrive with associated dysphagia.  Plan to proceed with percutaneous endoscopic gastrostomy tube placement today.    Plan  PEG placement this morning  Okay to utilize PEG tube for medications immediately  Okay to initiate tube feeds at 20 cc/h at 1400 today.  Do not advance until cleared by general surgery.  Keep patient on abdominal binder at all times.      Subjective  No events overnight.    Objective    Vitals:    03/29/24 0631   BP: 108/64   Pulse: 75   Resp: 16   Temp:    SpO2: 100%       Physical Exam  Constitutional: no acute distress  Neck: Supple, trachea midline  Respiratory: No increased work of breathing, Symmetric excursion  Cardiovascular: Well pefursed, no jugular venous distention evident   Abdominal: Soft, non-tender, mildly distended  Skin: Warm, dry, no rash on visualized skin surfaces    Laboratory Results  I have personally reviewed CBC with WC 11, hemoglobin 11, platelets 41.  CMP with creatinine 1.69, bicarb 23, albumin 2.5.    Radiology  None to review         Chintan Broderick MD  Colorectal & General Surgery  [facility]    [address], [address]  [place], [place], [address]  P: [phone]  F: [phone]

## 2024-03-29 NOTE — PLAN OF CARE
Goal Outcome Evaluation:  Plan of Care Reviewed With: patient           Outcome Evaluation: Patient seen for PT session this AM. Patient supine in bed upon arrival. Patient alert and able to follow commands. Speech garbled and difficult to understand at times. Patient able to follow commands to perform B LE exercises both and supine and seated at EOB. Patient appears very stiff in B LEs and UEs demonstrating very limited ROM. Patient required maxA to sit up to EOB. Patient required Chuckie-modA to maintain sitting balance at times with patient demonstrating a slight L lean. Patient fatigued quickly and returned to supine with maxA. Patient would continue to benefit from skilled PT intervention to address deficits in functional mobililty. PT will continue to monitor.

## 2024-03-29 NOTE — ANESTHESIA PREPROCEDURE EVALUATION
Anesthesia Evaluation     Patient summary reviewed and Nursing notes reviewed                Airway   Mallampati: III  TM distance: >3 FB  Dental      Pulmonary - negative pulmonary ROS   Cardiovascular     ECG reviewed  Patient on routine beta blocker  Rhythm: regular  Rate: normal    (+) hypertension      Neuro/Psych- negative ROS  GI/Hepatic/Renal/Endo    (+) GERD, renal disease- CRI    Musculoskeletal (-) negative ROS    Abdominal    Substance History - negative use     OB/GYN negative ob/gyn ROS         Other                    Anesthesia Plan    ASA 3     MAC     (Dementia  )  intravenous induction     Anesthetic plan, risks, benefits, and alternatives have been provided, discussed and informed consent has been obtained with: other.    Plan discussed with CRNA.    CODE STATUS:    Code Status (Patient has no pulse and is not breathing): CPR (Attempt to Resuscitate)  Medical Interventions (Patient has pulse or is breathing): Full

## 2024-03-29 NOTE — ANESTHESIA POSTPROCEDURE EVALUATION
Patient: Anthony Gallegos    Procedure Summary       Date: 03/29/24 Room / Location:  [facility] / [facility]COPY    Anesthesia Start: 0657 Anesthesia Stop: 0730    Procedure: ESOPHAGOGASTRODUODENOSCOPY WITH PERCUTANEOUS ENDOSCOPIC GASTROSTOMY TUBE INSERTION (Esophagus) Diagnosis:       Decreased oral intake      Dysphagia, unspecified type      (Decreased oral intake [R63.8])      (Dysphagia, unspecified type [R13.10])    Surgeons: Khadar Broderick MD Provider: Bennie Marquis MD    Anesthesia Type: MAC ASA Status: 3            Anesthesia Type: MAC    Vitals  Vitals Value Taken Time   /64 03/29/24 0729   Temp     Pulse 72 03/29/24 0740   Resp 19 03/29/24 0729   SpO2 100 % 03/29/24 0740   Vitals shown include unfiled device data.        Post Anesthesia Care and Evaluation    Patient location during evaluation: PACU  Patient participation: complete - patient participated  Level of consciousness: awake and alert  Pain management: adequate    Airway patency: patent  Anesthetic complications: No anesthetic complications    Cardiovascular status: acceptable  Respiratory status: acceptable  Hydration status: acceptable    Comments: --------------------            03/29/24               0729     --------------------   BP:       106/64     Pulse:      71       Resp:       19       Temp:                SpO2:      100%     --------------------

## 2024-03-29 NOTE — PROGRESS NOTES
Name: Anthony Gallegos ADMIT: 3/25/2024   : 1945  PCP: Provider, No Known    MRN: [id number] LOS: 4 days   AGE/SEX: 79 y.o. male  ROOM: [facility]     Subjective   Subjective   Patient seen this afternoon, status post PEG tube placement this morning, drowsy secondary to anesthesia.    Review of Systems   As above  Objective   Objective   Vital Signs  Temp:  [97.5 °F (36.4 °C)-98.8 °F (37.1 °C)] 98.4 °F (36.9 °C)  Heart Rate:  [65-85] 85  Resp:  [16-19] 18  BP: ()/(54-75) 98/54  SpO2:  [95 %-100 %] 100 %  on   ;   Device (Oxygen Therapy): room air  Body mass index is 22.76 kg/m².  Physical Exam    General: Laying in bed, drowsy, ill-appearing  HEENT: Normocephalic, atraumatic  CV: Regular rate and rhythm, no murmurs rubs   Lungs: CTA, no wheezing   Abdomen: Soft, PEG tube present, abdominal binder  Extremities: No significant peripheral edema , no cyanosis    Results Review     I reviewed the patient's new clinical results.  Results from last 7 days   Lab Units 24  0345 24  0757 24  1015 24  0431   WBC 10*3/mm3 11.18* 10.45 14.65* 10.90*   HEMOGLOBIN g/dL 11.4* 10.6* 11.8* 7.1*   PLATELETS 10*3/mm3 481* 478* 389 346     Results from last 7 days   Lab Units 24  0345 24  0757 24  0649 24  0406   SODIUM mmol/L 151* 153* 149* 138   POTASSIUM mmol/L 3.4* 3.3* 3.8 4.8   CHLORIDE mmol/L 118* 117* 113* 106   CO2 mmol/L 23.1 26.3 25.0 20.0*   BUN mg/dL 50* 62* 80* 78*   CREATININE mg/dL 1.69* 1.86* 2.20* 2.30*   GLUCOSE mg/dL 126* 103* 124* 104*   Estimated Creatinine Clearance: 40.7 mL/min (A) (by C-G formula based on SCr of 1.69 mg/dL (H)).  Results from last 7 days   Lab Units 24  03424  0757 24  1723   ALBUMIN g/dL 2.5* 2.5* 3.0*   BILIRUBIN mg/dL 0.7  --  0.6   ALK PHOS U/L 84  --  112   AST (SGOT) U/L 25  --  16   ALT (SGPT) U/L 10  --  15     Results from last 7 days   Lab Units 24  0345 24  0757 24  0649 24  1152  03/26/24  0406 03/25/24  1723   CALCIUM mg/dL 8.5* 8.5* 8.4*  --  8.9 9.7   ALBUMIN g/dL 2.5* 2.5*  --   --   --  3.0*   MAGNESIUM mg/dL 2.7* 3.3* 2.9* 3.0*  --  2.6*   PHOSPHORUS mg/dL 2.9 3.5 5.2* 5.6*  --   --      Results from last 7 days   Lab Units 03/25/24  2303 03/25/24 1956 03/25/24  1723   PROCALCITONIN ng/mL  --   --  3.36*   LACTATE mmol/L 1.8 2.1* 2.4*     COVID19   Date Value Ref Range Status   03/25/2024 Not Detected Not Detected - Ref. Range Final     Glucose   Date/Time Value Ref Range Status   03/29/2024 1301 56 (L) 70 - 130 mg/dL Final   03/29/2024 0813 81 70 - 130 mg/dL Final   03/29/2024 0626 91 70 - 130 mg/dL Final   03/29/2024 0504 95 70 - 130 mg/dL Final   03/29/2024 0017 93 70 - 130 mg/dL Final   03/28/2024 1117 97 70 - 130 mg/dL Final   03/28/2024 0547 88 70 - 130 mg/dL Final           FL Video Swallow Single Contrast  VIDEO SWALLOWING EXAMINATION BY SPEECH PATHOLOGY     Clinical: Dysphasia     Reference air kerma: 6.73 mGy     Video swallowing examination performed under the direction of speech  pathology. Imaging reviewed by radiologist who concurs with the  findings.     Speech pathology summary:      VFSS completed, Kristal SONG present. Aspiration was seen     By electronically signing this report, I, the supervising radiologist,  attest that I was not present for the procedure(s) but agree with the  final edited report..     Please refer to speech pathology report for further information.            This report was finalized on 3/28/2024 5:27 PM by Dr. Roddy Chaparro M.D  on Workstation: [id number]       Scheduled Medications  bisacodyl, 10 mg, Rectal, Once  ertapenem, 1,000 mg, Intravenous, Q24H  finasteride, 5 mg, Oral, Daily  heparin (porcine), 5,000 Units, Subcutaneous, Q8H  Menthol-Zinc Oxide, 1 Application, Topical, Q12H  pantoprazole, 40 mg, Oral, Q AM  polyethylene glycol, 17 g, Per G Tube, Daily  senna-docusate sodium, 2 tablet, Per G Tube, BID  tamsulosin, 0.4 mg, Oral,  Nightly    Infusions  dextrose, 125 mL/hr, Last Rate: Stopped (03/29/24 0600)  sodium chloride, 1,000 mL, Last Rate: 1,000 mL (03/29/24 0630)    Diet  NPO Diet NPO Type: Strict NPO    I have personally reviewed     [x]  Laboratory   [x]  Microbiology   [x]  Radiology   []  EKG/Telemetry  []  Cardiology/Vascular   []  Pathology    []  Records       Assessment/Plan     Active Hospital Problems    Diagnosis  POA    **UTI (urinary tract infection) [N39.0]  Yes    Encephalopathy [G93.40]  Unknown    Decreased oral intake [R63.8]  Unknown    Dysphagia [R13.10]  Unknown    MARTITA (acute kidney injury) [N17.9]  Yes    Severe malnutrition [E43]  Yes    Hypertension [I10]  Yes      Resolved Hospital Problems   No resolved problems to display.       Patient is a 78 y.o. male with a history of HTN, BPH pyelonephritis/UTI, who presented to [facility] from facility for failure to thrive, not eating or drinking much, weight loss, decreased mobility.    ESBL UTI  -Urine culture and blood culture pending  -Recent urine culture was positive for Klebsiella, sensitive to Zosyn  -Urine cultures came back positive for ESBL E. coli, sensitive to Zosyn  -Continue IV Zosyn, consult ID    MARTITA/bilateral hydronephrosis/  -Creatinine 2.5 on admission, up from 0.66 on 01/29/2024  -CT scan showed mild-to-moderate bilateral hydronephrosis   -Status post Mariscal catheter placement 03/26/2024 per urology  -Continue IV fluids,, monitor daily BMP  -Hold outpatient tenoretic    -Creatinine and sodium improving  -Nephrology managing      Encephalopathy  -Patient oriented to name only, not to time or place  -No history of dementia per daughter, at baseline oriented x 3  -CT head with no acute findings  -TSH normal, B12 above 2000  -Likely secondary to UTI as above  -Continue  treatment for UTI, monitor neurological status  -Remains oriented to name only  -Suspect underlying dementia  -Ordered MRI brain as patient with no significant improvement  despite treatment for UTI    Failure to thrive/malnutrition/decreased p.o. intake  -Core track and tube feeds ordered however patient refused  -speech therapy  reevaluated, pending underwent VFSS 03/28/2024, found to have diffuse to be profound generalized pharyngeal weakness, silent aspiration.  Strict n.p.o. recommended  -Palliative care evaluated for goals of care discussion, family wants to proceed with PEG tube placement.  Status post PEG tube placement 03/29    Constipation  -CT scan showed moderate gaseous distention of the colon with moderate amount of  stool in the right colon and rectum.  -Patient refused suppository previously  -Initiat MiraLAX and docusate senna via PEG tube placement  -Ordered enema    Hypertension  -BP acutely stable  -Holding outpatient Tenoretic    Anemia  -Hemoglobin stable  -Iron panel consistent with anemia of chronic disease      Subcu heparin  for DVT prophylaxis.  Full code.  Patient palliative care appropriate.  Palliative care evaluated 03/28 plan to continue current-treatment, remains full code.  Discussed with RN  Discussed with surgery  Anticipate discharge TBD      Copied text in this note has been reviewed and is accurate as of 03/29/24.         Dictated utilizing Dragon dictation        Nomi Osman MD  [facility]  03/29/24  14:39 EDT

## 2024-03-29 NOTE — PLAN OF CARE
Goal Outcome Evaluation:  Plan of Care Reviewed With: patient        Progress: improving    Pt speech remains garbled but mental status improving. Provider Rosa Beebe notified 3/25 urine cx (+) for Ecoli ESBL & said will change abx. IVF D5W @125cc/hr. AM Na level slightly improved from 153 to 151 & Cr level improving. Vitals stable. Pt UOP decreased so forde was flushed sterilely & several stringy clots came out and then urine output dumped/improved. Pt moved onto specialty bed to help with skin integrity. Went to OR at 6am for PEG placement. No other changes/events over PM.

## 2024-03-29 NOTE — THERAPY TREATMENT NOTE
Patient Name: Anthony Gallegos  : 1945    MRN: [id number]                              Today's Date: 3/29/2024       Admit Date: 3/25/2024    Visit Dx:     ICD-10-CM ICD-9-CM   1. Decreased oral intake  R63.8 783.9   2. MARTITA (acute kidney injury)  N17.9 584.9   3. Whole body pain  R52 780.96   4. Dysphagia, unspecified type  R13.10 787.20   5. Dehydration  E86.0 276.51   6. Elevated troponin  R79.89 790.6   7. Failure to thrive in adult  R62.7 783.7   8. Generalized weakness  R53.1 780.79   9. Weight loss  R63.4 783.21   10. Urinary tract infection in male  N39.0 599.0     Patient Active Problem List   Diagnosis    Ankylosing spondylitis of cervical region    Recent unexplained weight loss    Abnormal serum lipase level    Abnormal serum level of amylase    Hypertension    Boil    Immobility    Fall from bed    Tetrahydrocannabinol (THC) use disorder, mild, abuse    Generalized pain        Grief    DISH (disseminated idiopathic skeletal hyperostosis)    Generalized weakness    Severe malnutrition    Altered mental status    Transient alteration of awareness    GERD without esophagitis    BPH (benign prostatic hyperplasia)    UTI (urinary tract infection)    Dehydration    MARTITA (acute kidney injury)    Hyperglycemia    Encephalopathy    Decreased oral intake    Dysphagia     Past Medical History:   Diagnosis Date    Abscess of scrotum     MARTITA (acute kidney injury)     Altered mental state     Arthritis     AS (ankylosing spondylitis)     History of MRSA infection     Hypertension     Leukocytosis     Tetrahydrocannabinol (THC) use disorder, mild, abuse 2023    Uveitis      Past Surgical History:   Procedure Laterality Date    COLONOSCOPY      ROTATOR CUFF REPAIR Right     TOTAL HIP ARTHROPLASTY Left       General Information       Row Name 24 1124          Physical Therapy Time and Intention    Document Type therapy note (daily note)  -SM     Mode of Treatment individual therapy;physical  therapy  -       Row Name 03/29/24 1124          General Information    Patient Profile Reviewed yes  -     Existing Precautions/Restrictions fall  -     Barriers to Rehab visual deficit  -       Row Name 03/29/24 1124          Cognition    Orientation Status (Cognition) unable/difficult to assess;oriented to;person  Speech garbled. Patient able to follow commands  -       Row Name 03/29/24 1124          Safety Issues, Functional Mobility    Impairments Affecting Function (Mobility) balance;endurance/activity tolerance;strength;range of motion (ROM);pain  -SM               User Key  (r) = Recorded By, (t) = Taken By, (c) = Cosigned By      Initials Name Provider Type     Sussy Walker PT Physical Therapist                   Mobility       Row Name 03/29/24 1125          Bed Mobility    Bed Mobility supine-sit;sit-supine  -     Supine-Sit Dodson (Bed Mobility) maximum assist (25% patient effort);verbal cues  -     Sit-Supine Dodson (Bed Mobility) maximum assist (25% patient effort);verbal cues  -     Assistive Device (Bed Mobility) head of bed elevated;bed rails;draw sheet  -     Comment, (Bed Mobility) Malgorzata-modA to maintain sitting balance with a slight lean to the L  -SM               User Key  (r) = Recorded By, (t) = Taken By, (c) = Cosigned By      Initials Name Provider Type     Sussy Walker PT Physical Therapist                   Obj/Interventions       Row Name 03/29/24 1125          Motor Skills    Therapeutic Exercise other (see comments)  AP, HS, LAQs x10. BC, forward punches x10  -       Row Name 03/29/24 1125          Balance    Balance Assessment sitting static balance;sitting dynamic balance  -     Static Sitting Balance minimal assist  -     Dynamic Sitting Balance minimal assist;moderate assist  -     Position, Sitting Balance sitting edge of bed  -     Balance Interventions sitting;static;dynamic  -               User Key  (r) = Recorded By,  (t) = Taken By, (c) = Cosigned By      Initials Name Provider Type     Sussy Walker PT Physical Therapist                   Goals/Plan    No documentation.                  Clinical Impression       Row Name 03/29/24 1126          Pain    Pre/Posttreatment Pain Comment Patient groaned at times but unable to state where pain was located  -     Pain Intervention(s) Rest;Repositioned  -       Row Name 03/29/24 1126          Plan of Care Review    Plan of Care Reviewed With patient  -     Outcome Evaluation Patient seen for PT session this AM. Patient supine in bed upon arrival. Patient alert and able to follow commands. Speech garbled and difficult to understand at times. Patient able to follow commands to perform B LE exercises both and supine and seated at EOB. Patient appears very stiff in B LEs and UEs demonstrating very limited ROM. Patient required maxA to sit up to EOB. Patient required Chuckie-modA to maintain sitting balance at times with patient demonstrating a slight L lean. Patient fatigued quickly and returned to supine with maxA. Patient would continue to benefit from skilled PT intervention to address deficits in functional mobililty. PT will continue to monitor.  -[facility] Name 03/29/24 1126          Vital Signs    Pre Patient Position Supine  -     Intra Patient Position Sitting  -SM     Post Patient Position Supine  -SM       [facility] Name 03/29/24 1126          Positioning and Restraints    Pre-Treatment Position in bed  -SM     Post Treatment Position bed  -SM     In Bed notified nsg;call light within reach;encouraged to call for assist;exit alarm on;fowlers  -               User Key  (r) = Recorded By, (t) = Taken By, (c) = Cosigned By      Initials Name Provider Type     Sussy Walker PT Physical Therapist                   Outcome Measures       Row Name 03/29/24 1129 03/29/24 0000       How much help from another person do you currently need...    Turning from your back to  your side while in flat bed without using bedrails? 2  -SM 2  -KM    Moving from lying on back to sitting on the side of a flat bed without bedrails? 2  -SM 2  -KM    Moving to and from a bed to a chair (including a wheelchair)? 1  -SM 1  -KM    Standing up from a chair using your arms (e.g., wheelchair, bedside chair)? 2  -SM 1  -KM    Climbing 3-5 steps with a railing? 1  -SM 1  -KM    To walk in hospital room? 1  -SM 1  -KM    AM-PAC 6 Clicks Score (PT) 9  -SM 8  -KM    Highest Level of Mobility Goal 3 --> Sit at edge of bed  -SM 3 --> Sit at edge of bed  -KM      Row Name 03/29/24 1129          Functional Assessment    Outcome Measure Options AM-PAC 6 Clicks Basic Mobility (PT)  -               User Key  (r) = Recorded By, (t) = Taken By, (c) = Cosigned By      Initials Name Provider Type     Sussy Walker, GERI Physical Therapist     Martha Bellamy, RN Registered Nurse                                 Physical Therapy Education       Title: PT OT SLP Therapies (In Progress)       Topic: Physical Therapy (In Progress)       Point: Mobility training (In Progress)       Learning Progress Summary             Patient Acceptance, E, NR by  at 3/29/2024 1129    Acceptance, E,D, NR by MS at 3/27/2024 1540                         Point: Home exercise program (In Progress)       Learning Progress Summary             Patient Acceptance, E, NR by  at 3/29/2024 1129    Acceptance, E,D, NR by MS at 3/27/2024 1540                         Point: Body mechanics (In Progress)       Learning Progress Summary             Patient Acceptance, E, NR by  at 3/29/2024 1129    Acceptance, E,D, NR by MS at 3/27/2024 1540                         Point: Precautions (In Progress)       Learning Progress Summary             Patient Acceptance, E, NR by  at 3/29/2024 1129    Acceptance, E,D, NR by MS at 3/27/2024 1540                                         User Key       Initials Effective Dates Name Provider Type Discipline     MS 06/16/21 -  Landon Townsend PT Physical Therapist PT     05/02/22 -  Sussy Walker PT Physical Therapist PT                  PT Recommendation and Plan     Plan of Care Reviewed With: patient  Outcome Evaluation: Patient seen for PT session this AM. Patient supine in bed upon arrival. Patient alert and able to follow commands. Speech garbled and difficult to understand at times. Patient able to follow commands to perform B LE exercises both and supine and seated at EOB. Patient appears very stiff in B LEs and UEs demonstrating very limited ROM. Patient required maxA to sit up to EOB. Patient required Chuckie-modA to maintain sitting balance at times with patient demonstrating a slight L lean. Patient fatigued quickly and returned to supine with maxA. Patient would continue to benefit from skilled PT intervention to address deficits in functional mobililty. PT will continue to monitor.     Time Calculation:         PT Charges       Row Name 03/29/24 1130             Time Calculation    Start Time 1030  -SM      Stop Time 1045  -SM      Time Calculation (min) 15 min  -SM      PT Received On 03/29/24  -      PT - Next Appointment 04/01/24  -         Time Calculation- PT    Total Timed Code Minutes- PT 15 minute(s)  -SM         Timed Charges    56148 - PT Therapeutic Exercise Minutes 10  -SM      47247 - PT Therapeutic Activity Minutes 5  -SM         Total Minutes    Timed Charges Total Minutes 15  -SM       Total Minutes 15  -SM                User Key  (r) = Recorded By, (t) = Taken By, (c) = Cosigned By      Initials Name Provider Type     Sussy Walker PT Physical Therapist                  Therapy Charges for Today       Code Description Service Date Service Provider Modifiers Qty    [id number]  PT THER PROC EA 15 MIN 3/29/2024 Sussy Walker PT GP 1            PT G-Codes  Outcome Measure Options: AM-PAC 6 Clicks Basic Mobility (PT)  AM-PAC 6 Clicks Score (PT): 9       Sussy  Aaron, PT  3/29/2024

## 2024-03-29 NOTE — PLAN OF CARE
Goal Outcome Evaluation:  Plan of Care Reviewed With: patient        Progress: improving  Outcome Evaluation: Pt AOx2, knows self and knows he is at a hospital, unsure which one. LUQ PEG in place with Novasource infusing at 50mL/hr, with 50mL water flushes every 4 hours. Q2 turns. Pt blood glucose dropped to 57, D50 IV given. Blood glucose recheck to 99-86. MRI screensheet complete. VSS. Safety maintained.

## 2024-03-29 NOTE — CASE MANAGEMENT/SOCIAL WORK
Continued Stay Note  [facility]     Patient Name: Anthony Gallegos  MRN: [id number]  Today's Date: 3/29/2024    Admit Date: 3/25/2024    Plan: SNF vs returning to [facility] with home health   Discharge Plan       Row Name 03/29/24 1518       Plan    Plan SNF vs returning to [facility] with home health    Plan Comments [facility] unable to follow pt due to insurance provider. Family requested [facility], however, they do not accept insurance as well. Pt will need SNF placement due to tube feedings. Spoke with daughter, Whit, requested [facility]. Referrals placed, [facility] informed.                   Discharge Codes    No documentation.                 Expected Discharge Date and Time       Expected Discharge Date Expected Discharge Time    Apr 1, 2024               Viry Berg RN

## 2024-03-29 NOTE — CONSULTS
"Referring Provider: Casie Reardon MD  Reason for Consultation:     ESBL E. coli     Chief Complaint   Patient presents with    Weakness - Generalized         Subjective   History of present illness:  Patient is an 79-year-old female with past medical history of ankylosing spondylitis and uveitis on methotrexate, dementia and BPH who presented with weakness, poor p.o. intake and urinary retention.  ID consulted for ESBL E. coli.    On presentation patient has been afebrile with initial WBC of 11.  Found to have elevated creatinine at 2.5 which has continued to improve after Mariscal catheter placed in the setting of urinary retention with 3.5 L of urine obtained.  Procalcitonin originally elevated at 3.3 in the setting of renal failure.  Lactate mildly elevated at 2.4.  Urinalysis was abnormal with pyuria and urine culture has grown ESBL E. coli.  Blood cultures have remained negative and respiratory panel negative.  CT abdomen pelvis with mild to moderate bilateral hydronephrosis with wall thickening of the urinary bladder and several urinary diverticula.    Patient is underwent testing for dysphagia in the setting of failure to thrive.  EGD was performed with PEG tube placement on 3/29 by general surgery.  Palliative care has been following    Patient minimally responding to questions however does give thumbs up in the okay symbol.  States he is doing \"okay\".  States he is having some abdominal pain.  Family at bedside.  Remains afebrile with no leukocytosis.  Has been tolerating antibiotics.    Past Medical History:   Diagnosis Date    Abscess of scrotum     MARTITA (acute kidney injury)     Altered mental state     Arthritis     AS (ankylosing spondylitis)     History of MRSA infection     Hypertension     Leukocytosis     Tetrahydrocannabinol (THC) use disorder, mild, abuse 12/20/2023    Uveitis        Past Surgical History:   Procedure Laterality Date    COLONOSCOPY      ROTATOR CUFF REPAIR Right     TOTAL HIP " "ARTHROPLASTY Left 2014       family history includes Alzheimer's disease in his mother.     reports that he has never smoked. He has never used smokeless tobacco. He reports that he does not drink alcohol and does not use drugs.     No Known Allergies    Medication:  Antibiotics:  Anti-Infectives (From admission, onward)      Ordered     Dose/Rate Route Frequency Start Stop    03/25/24 2350  piperacillin-tazobactam (ZOSYN) 3.375 g IVPB in 100 mL NS MBP (CD)        Ordering Provider: Nomi Osman MD    3.375 g  over 4 Hours Intravenous Every 8 Hours 03/26/24 0300 04/02/24 0259    03/25/24 1922  piperacillin-tazobactam (ZOSYN) 3.375 g IVPB in 100 mL NS MBP (CD)        Ordering Provider: Charli Hogan MD    3.375 g  over 30 Minutes Intravenous Once 03/25/24 1938 03/25/24 2042              Objective     Physical Exam:   Vital Signs   Temp:  [97.5 °F (36.4 °C)-98.8 °F (37.1 °C)] 98.4 °F (36.9 °C)  Heart Rate:  [65-85] 85  Resp:  [16-19] 18  BP: ()/(54-75) 98/54    GENERAL: Awake and alert and thin and frail appearing  HEENT: Oropharynx is clear.   HEART: No peripheral edema.   LUNGS: Normal work of breathing  GI: Tender to palpation.  G-tube site clean and intact.  SKIN: Warm and dry without cutaneous eruptions in exposed areas.  Neuro: Following commands.    Results Review:   I reviewed the patient's new clinical results.  I reviewed the patient's new imaging results and agree with the interpretation.  I reviewed the patient's other test results and agree with the interpretation    Lab Results   Component Value Date    WBC 11.18 (H) 03/29/2024    HGB 11.4 (L) 03/29/2024    HCT 34.8 (L) 03/29/2024    MCV 85.1 03/29/2024     (H) 03/29/2024       No results found for: \"VANCOPEAK\", \"VANCOTROUGH\", \"VANCORANDOM\"    Lab Results   Component Value Date    GLUCOSE 126 (H) 03/29/2024    BUN 50 (H) 03/29/2024    CREATININE 1.69 (H) 03/29/2024    EGFRIFNONA 69 03/27/2018    EGFRIFAFRI 84 03/27/2018    BCR 29.6 " (H) 03/29/2024    CO2 23.1 03/29/2024    CALCIUM 8.5 (L) 03/29/2024    PROTENTOTREF 7.7 03/27/2018    ALBUMIN 2.5 (L) 03/29/2024    LABIL2 1.1 03/27/2018    AST 25 03/29/2024    ALT 10 03/29/2024         Estimated Creatinine Clearance: 40.7 mL/min (A) (by C-G formula based on SCr of 1.69 mg/dL (H)).      Microbiology:  3/25 respiratory panel negative  3/25 urine culture ESBL E. coli  3/25 blood cultures no growth to date    Radiology:  3/25 CT abdomen pelvis report reviewed with mild to moderate hydronephrosis and wall thickening of the bladder.  Gaseous distention of the colon.    Assessment     #Acute UTI  #Failure to thrive  #Dysphagia status post PEG tube placement  #MARTITA  #Encephalopathy  #Protein calorie malnutrition     Urinary isolate of ESBL E. coli with multiple treatment options.  Bactrim likely would not be feasible given his renal injury but if nephrology finds this acceptable he can be placed on 1 DS tablet twice daily to complete out a 7-day course through 4/1.  Otherwise he will require IV therapy and for ease of dosing recommend ertapenem 1 g daily through the same end date.      Thank you for allowing me to be involved in the care of this patient. Infectious diseases will sign off at this time with antibiotics plan in place, but please call me at [phone] if any further ID questions or new ID concerns.

## 2024-03-29 NOTE — CASE MANAGEMENT/SOCIAL WORK
Continued Stay Note  [facility]     Patient Name: Anthony Gallegos  MRN: [id number]  Today's Date: 3/29/2024    Admit Date: 3/25/2024    Plan: Return to [facility], will need transportation arranged.   Discharge Plan       Row Name 03/29/24 1034       Plan    Plan Return to [facility], will need transportation arranged.    Plan Comments Met with pt at bedside. Pt is non verbal. CCP contacted Miracle with [facility], pt may return with tube feedings, [facility] will manage tube feedings, referral placed in [facility] for [facility]. Contacted pts daughter, Whit, introduced self and explained role of . Informed that [facility] will accept with tube feedings. Pt will need transportation arranged upon discharge.                   Discharge Codes    No documentation.                 Expected Discharge Date and Time       Expected Discharge Date Expected Discharge Time    Apr 1, 2024               Vriy Berg, RN

## 2024-03-29 NOTE — CONSULTS
"Nutrition Services    Patient Name:  Anthony Gallegos  YOB: 1945  MRN: [id number]  Admit Date:  3/25/2024    Assessment Date:  03/29/24    Comments: Consult for tube feeding assessment    79 y.o. male with dementia, urinary tract infection, MARTITA, encephalopathy, and failure to thrive with associated dysphagia. Pt nonverbal. Pt had PEG placed this morning. Per surgery, okay to initiate tube feeds at 20 cc/h at 1400 today. Do not advance until cleared by general surgery.   Labs: Na, 151, K 3.4, Glu, 56, BUN 50, Platelets 481, Mg 2.7    Recommendations:  Formula: Novasource Renal   Method: Continuous  Feeding Schedule: 24 hr/day  Initial Rate/Volume: 20 mL *advance only per surgery recs*  Goal Rate/Volume: 50 mL  Water Flush: Per Nephrology (Na 151)    RD to follow tolerance, labs and clinical course.     CLINICAL NUTRITION ASSESSMENT      Reason for Assessment Nurse or Nurse Practitioner Consult, Physician Consult, TF Assessment      Diagnosis/Problem   UTI   Medical/Surgical History Past Medical History:   Diagnosis Date    Abscess of scrotum     MARTITA (acute kidney injury)     Altered mental state     Arthritis     AS (ankylosing spondylitis)     History of MRSA infection     Hypertension     Leukocytosis     Tetrahydrocannabinol (THC) use disorder, mild, abuse 12/20/2023    Uveitis        Past Surgical History:   Procedure Laterality Date    COLONOSCOPY      ROTATOR CUFF REPAIR Right     TOTAL HIP ARTHROPLASTY Left 2014        Anthropometrics        Current Height  Current Weight  BMI kg/m2 Height: 190.5 cm (75\")  Weight: 82.6 kg (182 lb 1.6 oz) (03/29/24 0548)  Body mass index is 22.76 kg/m².   Adjusted BMI (if applicable)    BMI Category Normal/Healthy (18.4 - 24.9)   Ideal Body Weight (IBW) 196 lb (89.1 kg)   Usual Body Weight (UBW) 152-188 lb   Weight Trend Loss, Amount/Timeframe: 36 lb (19.1%) x 2 months   Weight History Wt Readings from Last 30 Encounters:   03/29/24 0548 82.6 kg (182 lb 1.6 oz) "   03/28/24 0500 76.9 kg (169 lb 8 oz)   03/27/24 0617 76.4 kg (168 lb 6.4 oz)   03/25/24 2123 69.1 kg (152 lb 6.4 oz)   03/25/24 1516 79 kg (174 lb 2.6 oz)   01/31/24 0639 79 kg (174 lb 3.2 oz)   01/29/24 0509 78.8 kg (173 lb 11.6 oz)   01/28/24 0530 77.7 kg (171 lb 4.8 oz)   01/25/24 0537 76.4 kg (168 lb 6.9 oz)   01/23/24 0542 73.2 kg (161 lb 6 oz)   01/22/24 0502 75.1 kg (165 lb 9.1 oz)   01/21/24 2304 75.5 kg (166 lb 7.2 oz)   01/21/24 1744 81.6 kg (180 lb)   01/08/24 0253 81.6 kg (179 lb 14.3 oz)   01/05/24 0440 87.9 kg (193 lb 12.6 oz)   01/04/24 0439 85.6 kg (188 lb 11.4 oz)   01/03/24 0513 82.5 kg (181 lb 14.1 oz)   12/31/23 1300 80.5 kg (177 lb 7.5 oz)   12/31/23 0350 83.8 kg (184 lb 11.9 oz)   12/30/23 0511 85.3 kg (188 lb 0.8 oz)   12/29/23 0341 85.7 kg (188 lb 15 oz)   12/28/23 0435 85.4 kg (188 lb 4.4 oz)   12/27/23 0755 81 kg (178 lb 9.2 oz)   12/27/23 0423 85.4 kg (188 lb 4.4 oz)   12/25/23 0707 85.2 kg (187 lb 13.3 oz)   12/23/23 0608 81.3 kg (179 lb 3.7 oz)   12/22/23 0430 81.8 kg (180 lb 5.4 oz)   12/20/23 1005 81.9 kg (180 lb 8 oz)   03/27/18 1439 100 kg (221 lb)   03/22/18 1613 101 kg (222 lb 12.8 oz)   01/23/18 1414 101 kg (223 lb)   12/26/17 1111 99.8 kg (220 lb)   10/19/17 1239 95.3 kg (210 lb)   08/30/17 1459 91.3 kg (201 lb 3.2 oz)   08/29/17 1221 93 kg (205 lb)   08/10/17 1517 88 kg (194 lb)   07/14/17 1041 85.9 kg (189 lb 6.4 oz)   06/29/17 1318 84.5 kg (186 lb 3.2 oz)   06/21/17 1922 90.7 kg (200 lb)      --  Estimated/Assessed Needs        Current Weight  Weight: 82.6 kg (182 lb 1.6 oz) (03/29/24 0548)       Energy Requirements    Weight for Calculation 152 lb (69.1 kg)   Method for Estimation  30-35 kcal/kg   EST Needs (kcal/day) 6267-1708       Protein Requirements    Weight for Calculation 152 lb (69.1 kg)   EST Protein Needs (g/kg) 1.2 - 1.5 gm/kg   EST Daily Needs (g/day)        Fluid Requirements     Method for Estimation 1 mL/kcal    EST Needs (mL/day)      Labs        Pertinent Labs    Results from last 7 days   Lab Units 03/29/24  0345 03/28/24  0757 03/27/24  0649 03/26/24  0406 03/25/24  1723   SODIUM mmol/L 151* 153* 149*   < > 140   POTASSIUM mmol/L 3.4* 3.3* 3.8   < > 4.9   CHLORIDE mmol/L 118* 117* 113*   < > 101   CO2 mmol/L 23.1 26.3 25.0   < > 24.0   BUN mg/dL 50* 62* 80*   < > 82*   CREATININE mg/dL 1.69* 1.86* 2.20*   < > 2.50*   CALCIUM mg/dL 8.5* 8.5* 8.4*   < > 9.7   BILIRUBIN mg/dL 0.7  --   --   --  0.6   ALK PHOS U/L 84  --   --   --  112   ALT (SGPT) U/L 10  --   --   --  15   AST (SGOT) U/L 25  --   --   --  16   GLUCOSE mg/dL 126* 103* 124*   < > 125*    < > = values in this interval not displayed.     Results from last 7 days   Lab Units 03/29/24 0345 03/28/24  0757 03/27/24  1015 03/27/24  0649   MAGNESIUM mg/dL 2.7* 3.3*  --  2.9*   PHOSPHORUS mg/dL 2.9 3.5  --  5.2*   HEMOGLOBIN g/dL 11.4* 10.6*   < >  --    HEMATOCRIT % 34.8* 33.0*   < >  --    WBC 10*3/mm3 11.18* 10.45   < >  --    ALBUMIN g/dL 2.5* 2.5*  --   --     < > = values in this interval not displayed.     Results from last 7 days   Lab Units 03/29/24  0345 03/28/24  0757 03/27/24  1015 03/27/24  0431 03/26/24  0406   PLATELETS 10*3/mm3 481* 478* [phone]     COVID19   Date Value Ref Range Status   03/25/2024 Not Detected Not Detected - Ref. Range Final     Lab Results   Component Value Date    HGBA1C 5.80 (H) 01/23/2024          Medications           Scheduled Medications bisacodyl, 10 mg, Rectal, Once  finasteride, 5 mg, Oral, Daily  heparin (porcine), 5,000 Units, Subcutaneous, Q8H  Menthol-Zinc Oxide, 1 Application, Topical, Q12H  pantoprazole, 40 mg, Oral, Q AM  piperacillin-tazobactam, 3.375 g, Intravenous, Q8H  senna-docusate sodium, 2 tablet, Oral, BID   And  polyethylene glycol, 17 g, Oral, BID  tamsulosin, 0.4 mg, Oral, Nightly       Infusions dextrose, 125 mL/hr, Last Rate: Stopped (03/29/24 0600)  sodium chloride, 1,000 mL, Last Rate: 1,000 mL (03/29/24 0630)       PRN  Medications   acetaminophen    senna-docusate sodium **AND** polyethylene glycol **AND** bisacodyl **AND** bisacodyl    dextrose    dextrose    glucagon (human recombinant)    melatonin    ondansetron ODT **OR** ondansetron    [COMPLETED] Insert Peripheral IV **AND** sodium chloride     Physical Findings          General Findings confused, disoriented, loss of muscle mass, loss of subcutaneous fat, room air   Oral/Mouth Cavity WDL   Edema  no edema   Gastrointestinal hypoactive bowel sounds, non-distended    Skin  bruising, pressure injury: st II coccyx, other: abrasion   Tubes/Drains/Lines none   NFPE See Malnutrition Severity Assessment, Date Completed: 3/26   --  Malnutrition Severity Assessment      Patient meets criteria for : Severe Malnutrition         Current Nutrition Orders & Evaluation of Intake       Oral Nutrition     Food Allergies NKFA   Current PO Diet NPO Diet NPO Type: Strict NPO   Supplement n/a   PO Evaluation     % PO Intake N/a    Factors Affecting Intake: swallow impairment   --  PES STATEMENT / NUTRITION DIAGNOSIS      Nutrition Dx Problem  Problem: Malnutrition (severe)  Etiology: Factors Affecting Nutrition - poor PO, weakness, decreased mobility    Signs/Symptoms: NPO, Report of Minimal PO Intake, NFPE Results, Unintended Weight Change, and Report/Observation     NUTRITION INTERVENTION / PLAN OF CARE      Intervention Goal(s) Maintain nutrition status, Reduce/improve symptoms, Meet estimated needs, Disease management/therapy, Initiate feeding/diet, and Appropriate weight gain         RD Intervention/Action Await initiation/advancement of PO diet, Await initiation of EN/PN, Continue to monitor, and Care plan reviewed     Nutrition Prescription        Enteral Prescription:     Enteral Route PEG    TF Delivery Method Continuous    Enteral Product Novasource Renal    Modular None    Propofol Rate/Kcal     TF Start Rate/Volume  20 mL    TF Goal Rate/Volume 50 mL     Free Water Flush Per  Nephrology (Na 151)    TF Provision at Goal:          Calories 2200 kcal, meets 100 % needs         Protein  100 gm protein, meets 100 % needs         Fluid (mL) Per Nephrology (Na 151)    Prescription Ordered No, recommended     --      Monitor/Evaluation Per protocol   Discharge Plan/Needs Pending clinical course   --    RD to follow per protocol.      Electronically signed by:  Mindy Rowe  03/29/24 12:52 EDT

## 2024-03-29 NOTE — OP NOTE
Colorectal & General Surgery  Operative Report    Patient: Anthony Gallegos  YOB: 1945  MRN: [id number]  DATE OF PROCEDURE: 03/29/24     PREOPERATIVE DIAGNOSIS:  Dysphagia    POSTOPERATIVE DIAGNOSIS:  Same    PROCEDURE:  Esophagogastroduodenoscopy with percutaneous endoscopic gastrostomy tube placement    FINDINGS:  Normal stomach and duodenum.    SURGEON:  Chintan Broderick MD    ASSISTANT:  None    ANESTHESIA:  Monitored anesthesia care    EBL:  0 mL    SPECIMEN:  None    OPERATIVE DESCRIPTION:  The patient was brought to the operating room under the care of the nursing staff.  The patient was placed on the operating room table in the supine position where anesthesia was induced.  The patient was then prepped and positioned in the usual sterile fashion.  A standardized timeout was then performed.    The endoscope was inserted into the oropharynx and advanced into the esophagus, the stomach and eventually the first portion of the duodenum.  The scope was then brought back into the stomach and the stomach was maximally insufflated.  A point of light reflex and one-to-one palpation reflux was identified on the anterior abdominal wall and marked.  Local anesthetic was infiltrated at the site.  A 1 cm skin incision was made.  The finder needle was inserted under direct visualization.  The wire was then advanced into the stomach and grasped with the endoscopic snare.  The wire was then retracted through the oropharynx and the gastrostomy tube was inserted over the wire into the stomach.  The gastrostomy tube was placed at 3 centimeters from the skin.     All needle, sponge, and instrument counts were correct at the end of the case.    The patient tolerated the procedure well and was transferred to the postanesthesia care unit in stable condition.    Chintan Broderick M.D.  Colorectal & General Surgery  [facility] [address], [address]  [place], [place], [address]  P: [phone]  F:  [phone]

## 2024-03-30 LAB
ALBUMIN SERPL-MCNC: 2.2 G/DL (ref 3.5–5.2)
ANION GAP SERPL CALCULATED.3IONS-SCNC: 10 MMOL/L (ref 5–15)
ANION GAP SERPL CALCULATED.3IONS-SCNC: 9.1 MMOL/L (ref 5–15)
BACTERIA SPEC AEROBE CULT: NORMAL
BACTERIA SPEC AEROBE CULT: NORMAL
BASOPHILS # BLD AUTO: 0.05 10*3/MM3 (ref 0–0.2)
BASOPHILS NFR BLD AUTO: 0.5 % (ref 0–1.5)
BUN SERPL-MCNC: 28 MG/DL (ref 8–23)
BUN SERPL-MCNC: <2 MG/DL (ref 8–23)
BUN/CREAT SERPL: 28.6 (ref 7–25)
BUN/CREAT SERPL: ABNORMAL
CALCIUM SPEC-SCNC: 8.3 MG/DL (ref 8.6–10.5)
CALCIUM SPEC-SCNC: 8.3 MG/DL (ref 8.6–10.5)
CHLORIDE SERPL-SCNC: 113 MMOL/L (ref 98–107)
CHLORIDE SERPL-SCNC: 118 MMOL/L (ref 98–107)
CO2 SERPL-SCNC: 21.9 MMOL/L (ref 22–29)
CO2 SERPL-SCNC: 25 MMOL/L (ref 22–29)
CREAT SERPL-MCNC: 0.93 MG/DL (ref 0.76–1.27)
CREAT SERPL-MCNC: 0.98 MG/DL (ref 0.76–1.27)
DEPRECATED RDW RBC AUTO: 39.6 FL (ref 37–54)
EGFRCR SERPLBLD CKD-EPI 2021: 78 ML/MIN/1.73
EGFRCR SERPLBLD CKD-EPI 2021: 83 ML/MIN/1.73
EOSINOPHIL # BLD AUTO: 0.21 10*3/MM3 (ref 0–0.4)
EOSINOPHIL NFR BLD AUTO: 2 % (ref 0.3–6.2)
ERYTHROCYTE [DISTWIDTH] IN BLOOD BY AUTOMATED COUNT: 13.1 % (ref 12.3–15.4)
GLUCOSE BLDC GLUCOMTR-MCNC: 105 MG/DL (ref 70–130)
GLUCOSE BLDC GLUCOMTR-MCNC: 88 MG/DL (ref 70–130)
GLUCOSE BLDC GLUCOMTR-MCNC: 97 MG/DL (ref 70–130)
GLUCOSE SERPL-MCNC: 100 MG/DL (ref 65–99)
GLUCOSE SERPL-MCNC: 118 MG/DL (ref 65–99)
HCT VFR BLD AUTO: 34.6 % (ref 37.5–51)
HGB BLD-MCNC: 11.1 G/DL (ref 13–17.7)
IMM GRANULOCYTES # BLD AUTO: 0.08 10*3/MM3 (ref 0–0.05)
IMM GRANULOCYTES NFR BLD AUTO: 0.8 % (ref 0–0.5)
LYMPHOCYTES # BLD AUTO: 1.68 10*3/MM3 (ref 0.7–3.1)
LYMPHOCYTES NFR BLD AUTO: 16.1 % (ref 19.6–45.3)
MAGNESIUM SERPL-MCNC: 2.2 MG/DL (ref 1.6–2.4)
MCH RBC QN AUTO: 27.3 PG (ref 26.6–33)
MCHC RBC AUTO-ENTMCNC: 32.1 G/DL (ref 31.5–35.7)
MCV RBC AUTO: 85 FL (ref 79–97)
MONOCYTES # BLD AUTO: 0.49 10*3/MM3 (ref 0.1–0.9)
MONOCYTES NFR BLD AUTO: 4.7 % (ref 5–12)
NEUTROPHILS NFR BLD AUTO: 7.94 10*3/MM3 (ref 1.7–7)
NEUTROPHILS NFR BLD AUTO: 75.9 % (ref 42.7–76)
NRBC BLD AUTO-RTO: 0 /100 WBC (ref 0–0.2)
PHOSPHATE SERPL-MCNC: 2 MG/DL (ref 2.5–4.5)
PLATELET # BLD AUTO: 467 10*3/MM3 (ref 140–450)
PMV BLD AUTO: 9 FL (ref 6–12)
POTASSIUM SERPL-SCNC: 3.2 MMOL/L (ref 3.5–5.2)
POTASSIUM SERPL-SCNC: 3.3 MMOL/L (ref 3.5–5.2)
POTASSIUM SERPL-SCNC: 3.6 MMOL/L (ref 3.5–5.2)
POTASSIUM SERPL-SCNC: 4.4 MMOL/L (ref 3.5–5.2)
RBC # BLD AUTO: 4.07 10*6/MM3 (ref 4.14–5.8)
SODIUM SERPL-SCNC: 148 MMOL/L (ref 136–145)
SODIUM SERPL-SCNC: 149 MMOL/L (ref 136–145)
WBC NRBC COR # BLD AUTO: 10.45 10*3/MM3 (ref 3.4–10.8)

## 2024-03-30 PROCEDURE — 25810000003 SODIUM CHLORIDE 0.9 % SOLUTION: Performed by: STUDENT IN AN ORGANIZED HEALTH CARE EDUCATION/TRAINING PROGRAM

## 2024-03-30 PROCEDURE — 80069 RENAL FUNCTION PANEL: CPT | Performed by: INTERNAL MEDICINE

## 2024-03-30 PROCEDURE — 80048 BASIC METABOLIC PNL TOTAL CA: CPT | Performed by: INTERNAL MEDICINE

## 2024-03-30 PROCEDURE — 25010000002 ERTAPENEM PER 500 MG: Performed by: STUDENT IN AN ORGANIZED HEALTH CARE EDUCATION/TRAINING PROGRAM

## 2024-03-30 PROCEDURE — 25010000002 HEPARIN (PORCINE) PER 1000 UNITS: Performed by: SURGERY

## 2024-03-30 PROCEDURE — 99231 SBSQ HOSP IP/OBS SF/LOW 25: CPT | Performed by: SURGERY

## 2024-03-30 PROCEDURE — 83735 ASSAY OF MAGNESIUM: CPT | Performed by: SURGERY

## 2024-03-30 PROCEDURE — 85025 COMPLETE CBC W/AUTO DIFF WBC: CPT | Performed by: SURGERY

## 2024-03-30 PROCEDURE — 82948 REAGENT STRIP/BLOOD GLUCOSE: CPT

## 2024-03-30 PROCEDURE — 25010000002 ENOXAPARIN PER 10 MG: Performed by: STUDENT IN AN ORGANIZED HEALTH CARE EDUCATION/TRAINING PROGRAM

## 2024-03-30 PROCEDURE — 84132 ASSAY OF SERUM POTASSIUM: CPT | Performed by: STUDENT IN AN ORGANIZED HEALTH CARE EDUCATION/TRAINING PROGRAM

## 2024-03-30 RX ORDER — ENOXAPARIN SODIUM 100 MG/ML
40 INJECTION SUBCUTANEOUS EVERY 24 HOURS
Status: DISCONTINUED | OUTPATIENT
Start: 2024-03-30 | End: 2024-04-09 | Stop reason: HOSPADM

## 2024-03-30 RX ORDER — BISACODYL 10 MG
10 SUPPOSITORY, RECTAL RECTAL ONCE
Status: COMPLETED | OUTPATIENT
Start: 2024-03-30 | End: 2024-03-30

## 2024-03-30 RX ORDER — FENTANYL/ROPIVACAINE/NS/PF 2-625MCG/1
15 PLASTIC BAG, INJECTION (ML) EPIDURAL ONCE
Status: COMPLETED | OUTPATIENT
Start: 2024-03-30 | End: 2024-03-30

## 2024-03-30 RX ORDER — POTASSIUM CHLORIDE 1.5 G/1.58G
40 POWDER, FOR SOLUTION ORAL EVERY 4 HOURS
Status: COMPLETED | OUTPATIENT
Start: 2024-03-30 | End: 2024-03-30

## 2024-03-30 RX ORDER — POLYETHYLENE GLYCOL 3350 17 G/17G
17 POWDER, FOR SOLUTION ORAL 2 TIMES DAILY
Status: DISCONTINUED | OUTPATIENT
Start: 2024-03-30 | End: 2024-03-31

## 2024-03-30 RX ADMIN — FINASTERIDE 5 MG: 5 TABLET, FILM COATED ORAL at 09:24

## 2024-03-30 RX ADMIN — DOCUSATE SODIUM 50MG AND SENNOSIDES 8.6MG 2 TABLET: 8.6; 5 TABLET, FILM COATED ORAL at 09:23

## 2024-03-30 RX ADMIN — POTASSIUM CHLORIDE 40 MEQ: 1.5 POWDER, FOR SOLUTION ORAL at 14:07

## 2024-03-30 RX ADMIN — BISACODYL 10 MG: 10 SUPPOSITORY RECTAL at 18:29

## 2024-03-30 RX ADMIN — POLYETHYLENE GLYCOL 3350 17 G: 17 POWDER, FOR SOLUTION ORAL at 09:16

## 2024-03-30 RX ADMIN — LANSOPRAZOLE 30 MG: 15 TABLET, ORALLY DISINTEGRATING ORAL at 11:25

## 2024-03-30 RX ADMIN — DOCUSATE SODIUM 50MG AND SENNOSIDES 8.6MG 2 TABLET: 8.6; 5 TABLET, FILM COATED ORAL at 21:27

## 2024-03-30 RX ADMIN — ENOXAPARIN SODIUM 40 MG: 100 INJECTION SUBCUTANEOUS at 18:17

## 2024-03-30 RX ADMIN — POLYETHYLENE GLYCOL 3350 17 G: 17 POWDER, FOR SOLUTION ORAL at 21:27

## 2024-03-30 RX ADMIN — DIBASIC SODIUM PHOSPHATE, MONOBASIC POTASSIUM PHOSPHATE AND MONOBASIC SODIUM PHOSPHATE 2 TABLET: 852; 155; 130 TABLET ORAL at 09:19

## 2024-03-30 RX ADMIN — DIBASIC SODIUM PHOSPHATE, MONOBASIC POTASSIUM PHOSPHATE AND MONOBASIC SODIUM PHOSPHATE 2 TABLET: 852; 155; 130 TABLET ORAL at 15:58

## 2024-03-30 RX ADMIN — ANORECTAL OINTMENT 1 APPLICATION: 15.7; .44; 24; 20.6 OINTMENT TOPICAL at 13:38

## 2024-03-30 RX ADMIN — POTASSIUM PHOSPHATE, MONOBASIC POTASSIUM PHOSPHATE, DIBASIC 15 MMOL: 224; 236 INJECTION, SOLUTION, CONCENTRATE INTRAVENOUS at 10:12

## 2024-03-30 RX ADMIN — POTASSIUM CHLORIDE 40 MEQ: 1.5 POWDER, FOR SOLUTION ORAL at 18:19

## 2024-03-30 RX ADMIN — DIBASIC SODIUM PHOSPHATE, MONOBASIC POTASSIUM PHOSPHATE AND MONOBASIC SODIUM PHOSPHATE 2 TABLET: 852; 155; 130 TABLET ORAL at 21:27

## 2024-03-30 RX ADMIN — ANORECTAL OINTMENT 1 APPLICATION: 15.7; .44; 24; 20.6 OINTMENT TOPICAL at 21:27

## 2024-03-30 RX ADMIN — ERTAPENEM SODIUM 1000 MG: 1 INJECTION, POWDER, LYOPHILIZED, FOR SOLUTION INTRAMUSCULAR; INTRAVENOUS at 15:58

## 2024-03-30 RX ADMIN — HEPARIN SODIUM 5000 UNITS: 5000 INJECTION INTRAVENOUS; SUBCUTANEOUS at 09:30

## 2024-03-30 RX ADMIN — TAMSULOSIN HYDROCHLORIDE 0.4 MG: 0.4 CAPSULE ORAL at 21:27

## 2024-03-30 NOTE — PROGRESS NOTES
"[facility] Clinical Pharmacy Services: Enoxaparin Consult  Anthony Gallegos has a pharmacy consult to dose Enoxaparin for VTE Prophylaxis per Dr. Osman''s request.     Indication: VTE Prophylaxis  Home Anticoagulation: None  Prior Anticoagulation:  Heparin 5,000 units SQ q8h with last dose at 0930     Relevant clinical data and objective history reviewed:  79 y.o. male 190.5 cm (75\") 82.2 kg (181 lb 3.5 oz)   Body mass index is 22.65 kg/m².   Results from last 7 days   Lab Units 03/30/24  0305   PLATELETS 10*3/mm3 467*       WBC   Date Value Ref Range Status   03/30/2024 10.45 3.40 - 10.80 10*3/mm3 Final     RBC   Date Value Ref Range Status   03/30/2024 4.07 (L) 4.14 - 5.80 10*6/mm3 Final     Hemoglobin   Date Value Ref Range Status   03/30/2024 11.1 (L) 13.0 - 17.7 g/dL Final     Hematocrit   Date Value Ref Range Status   03/30/2024 34.6 (L) 37.5 - 51.0 % Final     MCV   Date Value Ref Range Status   03/30/2024 85.0 79.0 - 97.0 fL Final     MCH   Date Value Ref Range Status   03/30/2024 27.3 26.6 - 33.0 pg Final     MCHC   Date Value Ref Range Status   03/30/2024 32.1 31.5 - 35.7 g/dL Final     RDW   Date Value Ref Range Status   03/30/2024 13.1 12.3 - 15.4 % Final     RDW-SD   Date Value Ref Range Status   03/30/2024 39.6 37.0 - 54.0 fl Final     MPV   Date Value Ref Range Status   03/30/2024 9.0 6.0 - 12.0 fL Final     Platelets   Date Value Ref Range Status   03/30/2024 467 (H) 140 - 450 10*3/mm3 Final     Neutrophil %   Date Value Ref Range Status   03/30/2024 75.9 42.7 - 76.0 % Final     Lymphocyte %   Date Value Ref Range Status   03/30/2024 16.1 (L) 19.6 - 45.3 % Final     Monocyte %   Date Value Ref Range Status   03/30/2024 4.7 (L) 5.0 - 12.0 % Final     Eosinophil %   Date Value Ref Range Status   03/30/2024 2.0 0.3 - 6.2 % Final     Basophil %   Date Value Ref Range Status   03/30/2024 0.5 0.0 - 1.5 % Final     Immature Grans %   Date Value Ref Range Status   03/30/2024 0.8 (H) 0.0 - 0.5 % Final "     Neutrophils, Absolute   Date Value Ref Range Status   03/30/2024 7.94 (H) 1.70 - 7.00 10*3/mm3 Final     Lymphocytes, Absolute   Date Value Ref Range Status   03/30/2024 1.68 0.70 - 3.10 10*3/mm3 Final     Monocytes, Absolute   Date Value Ref Range Status   03/30/2024 0.49 0.10 - 0.90 10*3/mm3 Final     Eosinophils, Absolute   Date Value Ref Range Status   03/30/2024 0.21 0.00 - 0.40 10*3/mm3 Final     Basophils, Absolute   Date Value Ref Range Status   03/30/2024 0.05 0.00 - 0.20 10*3/mm3 Final     Immature Grans, Absolute   Date Value Ref Range Status   03/30/2024 0.08 (H) 0.00 - 0.05 10*3/mm3 Final     nRBC   Date Value Ref Range Status   03/30/2024 0.0 0.0 - 0.2 /100 WBC Final     CMP:        Lab 03/30/24  1013 03/30/24  0305 03/29/24  0345 03/28/24  0757 03/27/24  0649 03/26/24  1152 03/26/24  0406 03/25/24  1723   SODIUM 148* 149* 151* 153* 149*  --    < > 140   POTASSIUM 3.2* 3.3* 3.4* 3.3* 3.8  --    < > 4.9   CHLORIDE 113* 118* 118* 117* 113*  --    < > 101   CO2 25.0 21.9* 23.1 26.3 25.0  --    < > 24.0   ANION GAP 10.0 9.1 9.9 9.7 11.0  --    < > 15.0   BUN 28* <2* 50* 62* 80*  --    < > 82*   CREATININE 0.98 0.93 1.69* 1.86* 2.20*  --    < > 2.50*   EGFR 78.0 83.0 40.5* 36.1* 29.5*  --    < > 25.3*   GLUCOSE 100* 118* 126* 103* 124*  --    < > 125*   CALCIUM 8.3* 8.3* 8.5* 8.5* 8.4*  --    < > 9.7   MAGNESIUM  --  2.2 2.7* 3.3* 2.9* 3.0*  --  2.6*   PHOSPHORUS  --  2.0* 2.9 3.5 5.2* 5.6*  --   --    TOTAL PROTEIN  --   --  8.3  --   --   --   --  9.5*   ALBUMIN  --  2.2* 2.5* 2.5*  --   --   --  3.0*   GLOBULIN  --   --  5.8  --   --   --   --  6.5   ALT (SGPT)  --   --  10  --   --   --   --  15   AST (SGOT)  --   --  25  --   --   --   --  16   BILIRUBIN  --   --  0.7  --   --   --   --  0.6   ALK PHOS  --   --  84  --   --   --   --  112   LIPASE  --   --   --   --   --   --   --  62*    < > = values in this interval not displayed.        Estimated Creatinine Clearance: 69.9 mL/min (by C-G formula  "based on SCr of 0.98 mg/dL).    Assessment/Plan  Dr. Osman noted in part today \" DVT prophylaxis.  DC subcu heparin, transition to Lovenox as creatinine has improved\"    Plan to start patient on Enoxaparin 40mg SQ q24h this afternoon given Heparin SQ dose.  Estimated CrCl > 30 mL/min at this time.    Pharmacy will continue to follow.  Daily CBC and Renal Function Panel already on order    Thanks, Yimi Dalal, PharmD  Clinical Pharmacist    "

## 2024-03-30 NOTE — PROGRESS NOTES
Name: Anthony Gallegos ADMIT: 3/25/2024   : 1945  PCP: Provider, No Known    MRN: [id number] LOS: 5 days   AGE/SEX: 79 y.o. male  ROOM: [facility] ([facility]     Subjective   Subjective   Patient seen this morning, seems to be more alert, oriented to name and place.  No complaints when asked.  Received enema yesterday, had small BM.  On tube feeds, tolerating.      Review of Systems   As above  Objective   Objective   Vital Signs  Temp:  [97.7 °F (36.5 °C)-98.4 °F (36.9 °C)] 97.7 °F (36.5 °C)  Heart Rate:  [78-85] 78  Resp:  [18] 18  BP: ()/(54-63) 118/62  SpO2:  [99 %-100 %] 99 %  on   ;   Device (Oxygen Therapy): room air  Body mass index is 22.65 kg/m².  Physical Exam    General: Laying in bed, oriented to name and place, not to year, follows commands, chronically ill-appearing  HEENT: Normocephalic, atraumatic  CV: Regular rate and rhythm, no murmurs rubs   Lungs: CTA, no wheezing   Abdomen: Soft, PEG tube present, abdominal binder  Extremities: No significant peripheral edema , no cyanosis    Results Review     I reviewed the patient's new clinical results.  Results from last 7 days   Lab Units 24  0305 24  0345 24  0757 24  1015   WBC 10*3/mm3 10.45 11.18* 10.45 14.65*   HEMOGLOBIN g/dL 11.1* 11.4* 10.6* 11.8*   PLATELETS 10*3/mm3 467* 481* 478* 389     Results from last 7 days   Lab Units 24  1013 24  0305 24  0345 24  0757   SODIUM mmol/L 148* 149* 151* 153*   POTASSIUM mmol/L 3.2* 3.3* 3.4* 3.3*   CHLORIDE mmol/L 113* 118* 118* 117*   CO2 mmol/L 25.0 21.9* 23.1 26.3   BUN mg/dL 28* <2* 50* 62*   CREATININE mg/dL 0.98 0.93 1.69* 1.86*   GLUCOSE mg/dL 100* 118* 126* 103*   Estimated Creatinine Clearance: 69.9 mL/min (by C-G formula based on SCr of 0.98 mg/dL).  Results from last 7 days   Lab Units 24  0305 24  0345 24  0757 24  1723   ALBUMIN g/dL 2.2* 2.5* 2.5* 3.0*   BILIRUBIN mg/dL  --  0.7  --  0.6   ALK PHOS U/L  --  84  --  112    AST (SGOT) U/L  --  25  --  16   ALT (SGPT) U/L  --  10  --  15     Results from last 7 days   Lab Units 03/30/24  1013 03/30/24  0305 03/29/24  0345 03/28/24  0757 03/27/24  0649 03/26/24  0406 03/25/24  1723   CALCIUM mg/dL 8.3* 8.3* 8.5* 8.5* 8.4*   < > 9.7   ALBUMIN g/dL  --  2.2* 2.5* 2.5*  --   --  3.0*   MAGNESIUM mg/dL  --  2.2 2.7* 3.3* 2.9*   < > 2.6*   PHOSPHORUS mg/dL  --  2.0* 2.9 3.5 5.2*   < >  --     < > = values in this interval not displayed.     Results from last 7 days   Lab Units 03/25/24  2303 03/25/24  1956 03/25/24  1723   PROCALCITONIN ng/mL  --   --  3.36*   LACTATE mmol/L 1.8 2.1* 2.4*     COVID19   Date Value Ref Range Status   03/25/2024 Not Detected Not Detected - Ref. Range Final     Glucose   Date/Time Value Ref Range Status   03/30/2024 0755 97 70 - 130 mg/dL Final   03/30/2024 0621 105 70 - 130 mg/dL Final   03/29/2024 2351 91 70 - 130 mg/dL Final   03/29/2024 1746 86 70 - 130 mg/dL Final   03/29/2024 1620 99 70 - 130 mg/dL Final   03/29/2024 1301 56 (L) 70 - 130 mg/dL Final   03/29/2024 0813 81 70 - 130 mg/dL Final           MRI Brain Without Contrast  Narrative: BRAIN MRI WITHOUT CONTRAST     HISTORY: Mental status change, unknown cause; R63.8-Other symptoms and  signs concerning food and fluid intake; N17.9-Acute kidney failure,  unspecified; R52-Pain, unspecified; R13.10-Dysphagia, unspecified;  E86.0-Dehydration; R79.89-Other specified abnormal findings of blood  chemistry; R62.7-Adult failure to thrive; R53.1-Weakness; R63.4-Abnormal  weight loss; N39.0-Urinary tract infection, site not specifi     COMPARISON: March 25, 2024.     FINDINGS:  Multiplanar images of the head were obtained without  gadolinium. No areas of restricted diffusion are seen to suggest acute  infarct. There is atrophy. There is extensive periventricular and deep  white matter microangiopathic change. There is no midline shift or mass  effect. Intracranial flow voids appear intact. Old lacunar infarcts  are  noted within the left basal ganglia. No abnormality is seen on gradient  echo imaging. There may be some mucosal thickening within the ethmoid  sinuses.     Impression: 1. No acute intracranial abnormality.     This report was finalized on 3/29/2024 9:26 PM by Dr. Kirsten Peña M.D on Workstation: [id number]       Scheduled Medications  bisacodyl, 10 mg, Rectal, Once  ertapenem, 1,000 mg, Intravenous, Q24H  finasteride, 5 mg, Oral, Daily  heparin (porcine), 5,000 Units, Subcutaneous, Q8H  lansoprazole, 30 mg, Per G Tube, Q AM  Menthol-Zinc Oxide, 1 Application, Topical, Q12H  phosphorus, 500 mg, Per G Tube, TID  polyethylene glycol, 17 g, Per G Tube, Daily  potassium phosphate, 15 mmol, Intravenous, Once  senna-docusate sodium, 2 tablet, Per G Tube, BID  tamsulosin, 0.4 mg, Oral, Nightly    Infusions  dextrose, 75 mL/hr, Last Rate: 75 mL/hr (03/29/24 2226)    Diet  NPO Diet NPO Type: Strict NPO    I have personally reviewed     [x]  Laboratory   [x]  Microbiology   []  Radiology   []  EKG/Telemetry  []  Cardiology/Vascular   []  Pathology    []  Records       Assessment/Plan     Active Hospital Problems    Diagnosis  POA    **UTI (urinary tract infection) [N39.0]  Yes    Encephalopathy [G93.40]  Unknown    Decreased oral intake [R63.8]  Unknown    Dysphagia [R13.10]  Unknown    MARTITA (acute kidney injury) [N17.9]  Yes    Severe malnutrition [E43]  Yes    Hypertension [I10]  Yes      Resolved Hospital Problems   No resolved problems to display.       Patient is a 78 y.o. male with a history of HTN, BPH pyelonephritis/UTI, who presented to [facility] from facility for failure to thrive, not eating or drinking much, weight loss, decreased mobility.    ESBL UTI  -Initially treated with IV Zosyn, urine culture came back positive for ESBL, ID consulted, patient transition to IV ertapenem 1 g daily through 04/1 per ID recommendations.    MARTITA/bilateral hydronephrosis  -Creatinine 2.5 on admission,  up from 0.66 on 01/29/2024  -CT scan showed mild-to-moderate bilateral hydronephrosis   -Status post Mariscal catheter placement 03/26/2024 per urology  -Creatinine improved, 0.98  -Nephrology managing    Hypokalemia/hypophosphatemia  -Potassium 3.2, phosphorus 2.0  -Ordered IV and p.o. replacement  -Repeat labs in AM      Encephalopathy  -Likely secondary to UTI/dehydration/malnutrition  -No history of dementia per daughter, at baseline oriented x 3  -CT head with no acute findings  -TSH normal, B12 above 2000  -Follow-up MRI brain 03/29/2024 showed old stroke but no acute findings  -Improving, alert, oriented x 2 this morning      Failure to thrive/malnutrition/decreased p.o. intake  -Core track and tube feeds ordered however patient refused  -speech therapy  reevaluated, pending underwent VFSS 03/28/2024, found to have diffuse to be profound generalized pharyngeal weakness, silent aspiration.  Strict n.p.o. recommended  -Palliative care evaluated for goals of care discussion, family wants to proceed with PEG tube placement.  -Status post PEG tube placement 03/29  -Continue tube feeds    Constipation  -CT scan showed moderate gaseous distention of the colon with moderate amount of  stool in the right colon and rectum.  -Patient refused suppository previously  -Status post Fleet enema 03/29, small BM afterwards  -Continue Dianne-Colace twice daily, MiraLAX daily  -    Hypertension  -BP acutely stable  -Holding outpatient Tenoretic    Anemia  -Hemoglobin stable  -Iron panel consistent with anemia of chronic disease       DVT prophylaxis.  DC subcu heparin, transition to Lovenox as creatinine has improved  Full code.  Patient palliative care appropriate.  Palliative care evaluated 03/28 plan to continue current-treatment, remains full code.  Anticipate discharge SNF, timing to be determined, likely stable to be discharged in 1-2 days      Copied text in this note has been reviewed and is accurate as of 03/30/24.          Dictated utilizing Dragon dictation        Nomi Osman MD  [facility]  03/30/24  11:19 EDT

## 2024-03-30 NOTE — PLAN OF CARE
Goal Outcome Evaluation:              Outcome Evaluation: VSS. TOLERATING TUBE FEEDINGS WELL. HAD A FLEETS ENEMA AND RECEIVING PERICOLACE BUT SO FAR ONLY SCANT AMT. IN BRIEF. POTASSIUM STILL RUNNING LOW AFTER REPLACEMENT GIVEN LAST EVENING PER NEPHROLOGY ORDER.

## 2024-03-30 NOTE — PROGRESS NOTES
Colorectal & General Surgery  Progress Note    Patient: Anthony Gallegos  YOB: 1945  MRN: [id number]      Assessment  Anthony Gallegos is a 79 y.o. male with dysphagia and severe dementia now postprocedure day 1 from EGD with PEG placement.  The gastrostomy tube is in good order.  Loosened a little bit.  Rotates freely.  Tolerating tube feeds well.    Plan  Advance tube feeds as tolerated  Please call with any questions or concerns  I will sign off for now      Subjective  No acute events.    Objective    Vitals:    03/30/24 0758   BP: 118/62   Pulse: 78   Resp: 18   Temp: 97.7 °F (36.5 °C)   SpO2: 99%       Physical Exam  Constitutional: Well-developed well-nourished, no acute distress  Neck: Supple, trachea midline  Respiratory: No increased work of breathing, Symmetric excursion  Cardiovascular: Well pefursed, no jugular venous distention evident   Abdominal: Gastrostomy tube in place 3 cm at the skin, loosened slightly.  Rotates freely.  Soft, non-tender, non-distended  Skin: Warm, dry, no rash on visualized skin surfaces  Psychiatric: Alert and oriented ×3, normal affect     Laboratory Results  I have personally reviewed CBC with WBC 10, hemoglobin, platelets 467.  BMP with creatinine 0.9, potassium 3.2, glucose 100.    Radiology  None review         Chintan Broderick MD  Colorectal & General Surgery  [facility]    [address], [address]  [place], [place], [address]  P: [phone]  F: [phone]

## 2024-03-30 NOTE — PROGRESS NOTES
[facility] Progress Note      Patient Name: Anthony Gallegos  : 1945  MRN: [id number]  Primary Care Physician:  Provider, No Known  Date of admission: 3/25/2024    Subjective     Interval History:   F/u MARTITA CKD2 hyperNa    Review of Systems:   PEG yesterday  Getting water flushes 50 cc q4h    Objective     Vitals:   Temp:  [97.7 °F (36.5 °C)-98.4 °F (36.9 °C)] 97.7 °F (36.5 °C)  Heart Rate:  [78-85] 78  Resp:  [18] 18  BP: ()/(54-63) 118/62    Intake/Output Summary (Last 24 hours) at 3/30/2024 0926  Last data filed at 3/30/2024 0606  Gross per 24 hour   Intake 1770 ml   Output 2350 ml   Net -580 ml       Physical Exam:    General Appearance: frail cachectic & confused  Neck: supple, no JVD  Lungs: CTA bilat no rales  Heart: RRR, normal S1 and S2  Abdomen: soft, nontender, +PEG  : forde  Extremities: no edema, cyanosis or clubbing       Scheduled Meds:     bisacodyl, 10 mg, Rectal, Once  ertapenem, 1,000 mg, Intravenous, Q24H  finasteride, 5 mg, Oral, Daily  heparin (porcine), 5,000 Units, Subcutaneous, Q8H  Menthol-Zinc Oxide, 1 Application, Topical, Q12H  pantoprazole, 40 mg, Oral, Q AM  phosphorus, 500 mg, Per G Tube, TID  polyethylene glycol, 17 g, Per G Tube, Daily  potassium phosphate, 15 mmol, Intravenous, Once  senna-docusate sodium, 2 tablet, Per G Tube, BID  tamsulosin, 0.4 mg, Oral, Nightly      IV Meds:   dextrose, 75 mL/hr, Last Rate: 75 mL/hr (24)        Results Reviewed:   I have personally reviewed the results from the time of this admission to 3/30/2024 09:26 EDT     Results from last 7 days   Lab Units 24  0305 24  0345 24  0757 24  0406 24  1723   SODIUM mmol/L 149* 151* 153*   < > 140   POTASSIUM mmol/L 3.3* 3.4* 3.3*   < > 4.9   CHLORIDE mmol/L 118* 118* 117*   < > 101   CO2 mmol/L 21.9* 23.1 26.3   < > 24.0   BUN mg/dL <2* 50* 62*   < > 82*   CREATININE mg/dL 0.93 1.69* 1.86*   < > 2.50*   CALCIUM mg/dL 8.3*  8.5* 8.5*   < > 9.7   BILIRUBIN mg/dL  --  0.7  --   --  0.6   ALK PHOS U/L  --  84  --   --  112   ALT (SGPT) U/L  --  10  --   --  15   AST (SGOT) U/L  --  25  --   --  16   GLUCOSE mg/dL 118* 126* 103*   < > 125*    < > = values in this interval not displayed.     Estimated Creatinine Clearance: 73.7 mL/min (by C-G formula based on SCr of 0.93 mg/dL).  Results from last 7 days   Lab Units 03/30/24  0305 03/29/24  0345 03/28/24  0757   MAGNESIUM mg/dL 2.2 2.7* 3.3*   PHOSPHORUS mg/dL 2.0* 2.9 3.5         Results from last 7 days   Lab Units 03/30/24  0305 03/29/24  0345 03/28/24  0757 03/27/24  1015 03/27/24  0431   WBC 10*3/mm3 10.45 11.18* 10.45 14.65* 10.90*   HEMOGLOBIN g/dL 11.1* 11.4* 10.6* 11.8* 7.1*   PLATELETS 10*3/mm3 467* 481* 478* 389 346           Assessment / Plan     ASSESSMENT:  Acute kidney injury on CKD 2 with very poor muscle mass, baseline creatinine 0.8-1, improving.  MARTITA likely due to obstruction, volume depletion.  Nephrotic-range proteinuria by ratio in January  Hypernatremia due to free water deficit - Na down to 149 with water flushes   E. coli urinary tract infection on Zosyn  BPH; Mariscal catheter in place  Failure to thrive; PEG placed 3/29  Dysphagia with recent aspiration  Anemia    PLAN:  Repeat BMP - suspect lab error (BUN 50 -> less than 2) and adjust water flushes accordingly; d/w RN  Prognosis very poor      Shukri Hassan MD  03/30/24  09:26 EDT    [facility]  [phone]

## 2024-03-30 NOTE — PROGRESS NOTES
[facility] Progress Note      Patient Name: Anthony Gallegos  : 1945  MRN: [id number]  Primary Care Physician:  Provider, No Known  Date of admission: 3/25/2024    Subjective     Interval History:   Follow-up acute kidney injury on CKD 2.     Breathing is comfortable on room air  Denies any abdominal pain despite PEG tube placement earlier  UOP yesterday 1.9 L; Mariscal catheter in place  Feels thirsty    Review of Systems:   As noted above    Objective     Vitals:   Temp:  [97.9 °F (36.6 °C)-98.8 °F (37.1 °C)] 98.4 °F (36.9 °C)  Heart Rate:  [65-85] 84  Resp:  [16-19] 18  BP: ()/(54-74) 116/61    Intake/Output Summary (Last 24 hours) at 3/29/2024 2034  Last data filed at 3/29/2024 1729  Gross per 24 hour   Intake 3040.42 ml   Output 2700 ml   Net 340.42 ml       Physical Exam:    General: Cachectic, chronically ill, talks in whispers  Skin: warm and dry; vitiligo  HEENT: oral mucosa dry.  Edentulous.  Nonicteric sclera  Neck: supple, no JVD  Lungs: Clear to auscultation bilaterally; unlabored on RA   Heart: RRR, normal S1 and S2  Abdomen: soft, nontender, distended, +bs  : Mariscal catheter  Extremities: Trace lower extremity edema.  Very poor muscle mass.      Scheduled Meds:     bisacodyl, 10 mg, Rectal, Once  ertapenem, 1,000 mg, Intravenous, Q24H  finasteride, 5 mg, Oral, Daily  heparin (porcine), 5,000 Units, Subcutaneous, Q8H  Menthol-Zinc Oxide, 1 Application, Topical, Q12H  pantoprazole, 40 mg, Oral, Q AM  polyethylene glycol, 17 g, Per G Tube, Daily  senna-docusate sodium, 2 tablet, Per G Tube, BID  tamsulosin, 0.4 mg, Oral, Nightly      IV Meds:   sodium chloride, 1,000 mL, Last Rate: 1,000 mL (24 0630)        Results Reviewed:   I have personally reviewed the results from the time of this admission to 3/29/2024 20:34 EDT     Results from last 7 days   Lab Units 24  0345 24  0757 24  0649 24  0406 24  1723   SODIUM mmol/L 151* 153*  149*   < > 140   POTASSIUM mmol/L 3.4* 3.3* 3.8   < > 4.9   CHLORIDE mmol/L 118* 117* 113*   < > 101   CO2 mmol/L 23.1 26.3 25.0   < > 24.0   BUN mg/dL 50* 62* 80*   < > 82*   CREATININE mg/dL 1.69* 1.86* 2.20*   < > 2.50*   CALCIUM mg/dL 8.5* 8.5* 8.4*   < > 9.7   BILIRUBIN mg/dL 0.7  --   --   --  0.6   ALK PHOS U/L 84  --   --   --  112   ALT (SGPT) U/L 10  --   --   --  15   AST (SGOT) U/L 25  --   --   --  16   GLUCOSE mg/dL 126* 103* 124*   < > 125*    < > = values in this interval not displayed.       Estimated Creatinine Clearance: 40.7 mL/min (A) (by C-G formula based on SCr of 1.69 mg/dL (H)).    Results from last 7 days   Lab Units 03/29/24  0345 03/28/24  0757 03/27/24  0649   MAGNESIUM mg/dL 2.7* 3.3* 2.9*   PHOSPHORUS mg/dL 2.9 3.5 5.2*             Results from last 7 days   Lab Units 03/29/24  0345 03/28/24  0757 03/27/24  1015 03/27/24  0431 03/26/24  0406   WBC 10*3/mm3 11.18* 10.45 14.65* 10.90* 12.83*   HEMOGLOBIN g/dL 11.4* 10.6* 11.8* 7.1* 13.0   PLATELETS 10*3/mm3 481* 478* 389 346 422             Assessment / Plan     ASSESSMENT:  Acute kidney injury on CKD 2 with very poor muscle mass, baseline creatinine 0.8-1, improving.  MARTITA likely due to obstruction, volume depletion.  Nephrotic-range proteinuria by ratio in January  Hypernatremia due to free water deficit  E. coli urinary tract infection on Zosyn  BPH; Mariscal catheter in place  Failure to thrive; PEG placed 3/29  Dysphagia with recent aspiration  Anemia    PLAN:  Increase free water flushes to 50 mL/h  Give an additional liter of D5W  Replace potassium  Family reviewing goals of care    Thank you for involving us in the care of Anthony Gallegos.  Please feel free to call with any questions.    John Nash MD  03/29/24  20:34 EDT    [facility]  [phone]    Please note that portions of this note were completed with a voice recognition program.

## 2024-03-31 LAB
ALBUMIN SERPL-MCNC: 2.3 G/DL (ref 3.5–5.2)
ANION GAP SERPL CALCULATED.3IONS-SCNC: 8.8 MMOL/L (ref 5–15)
BASOPHILS # BLD AUTO: 0.05 10*3/MM3 (ref 0–0.2)
BASOPHILS NFR BLD AUTO: 0.5 % (ref 0–1.5)
BUN SERPL-MCNC: 27 MG/DL (ref 8–23)
BUN/CREAT SERPL: 27.3 (ref 7–25)
CALCIUM SPEC-SCNC: 8.2 MG/DL (ref 8.6–10.5)
CHLORIDE SERPL-SCNC: 116 MMOL/L (ref 98–107)
CO2 SERPL-SCNC: 24.2 MMOL/L (ref 22–29)
CREAT SERPL-MCNC: 0.99 MG/DL (ref 0.76–1.27)
DEPRECATED RDW RBC AUTO: 41.2 FL (ref 37–54)
EGFRCR SERPLBLD CKD-EPI 2021: 77 ML/MIN/1.73
EOSINOPHIL # BLD AUTO: 0.23 10*3/MM3 (ref 0–0.4)
EOSINOPHIL NFR BLD AUTO: 2.1 % (ref 0.3–6.2)
ERYTHROCYTE [DISTWIDTH] IN BLOOD BY AUTOMATED COUNT: 13.1 % (ref 12.3–15.4)
GLUCOSE BLDC GLUCOMTR-MCNC: 122 MG/DL (ref 70–130)
GLUCOSE SERPL-MCNC: 96 MG/DL (ref 65–99)
HCT VFR BLD AUTO: 35.7 % (ref 37.5–51)
HGB BLD-MCNC: 11.1 G/DL (ref 13–17.7)
IMM GRANULOCYTES # BLD AUTO: 0.06 10*3/MM3 (ref 0–0.05)
IMM GRANULOCYTES NFR BLD AUTO: 0.5 % (ref 0–0.5)
LYMPHOCYTES # BLD AUTO: 2.24 10*3/MM3 (ref 0.7–3.1)
LYMPHOCYTES NFR BLD AUTO: 20.5 % (ref 19.6–45.3)
MAGNESIUM SERPL-MCNC: 2.1 MG/DL (ref 1.6–2.4)
MCH RBC QN AUTO: 26.9 PG (ref 26.6–33)
MCHC RBC AUTO-ENTMCNC: 31.1 G/DL (ref 31.5–35.7)
MCV RBC AUTO: 86.4 FL (ref 79–97)
MONOCYTES # BLD AUTO: 0.63 10*3/MM3 (ref 0.1–0.9)
MONOCYTES NFR BLD AUTO: 5.8 % (ref 5–12)
NEUTROPHILS NFR BLD AUTO: 7.72 10*3/MM3 (ref 1.7–7)
NEUTROPHILS NFR BLD AUTO: 70.6 % (ref 42.7–76)
NRBC BLD AUTO-RTO: 0 /100 WBC (ref 0–0.2)
PHOSPHATE SERPL-MCNC: 2.6 MG/DL (ref 2.5–4.5)
PLATELET # BLD AUTO: 422 10*3/MM3 (ref 140–450)
PMV BLD AUTO: 9 FL (ref 6–12)
POTASSIUM SERPL-SCNC: 3.8 MMOL/L (ref 3.5–5.2)
RBC # BLD AUTO: 4.13 10*6/MM3 (ref 4.14–5.8)
SODIUM SERPL-SCNC: 149 MMOL/L (ref 136–145)
WBC NRBC COR # BLD AUTO: 10.93 10*3/MM3 (ref 3.4–10.8)

## 2024-03-31 PROCEDURE — 85025 COMPLETE CBC W/AUTO DIFF WBC: CPT | Performed by: SURGERY

## 2024-03-31 PROCEDURE — 82948 REAGENT STRIP/BLOOD GLUCOSE: CPT

## 2024-03-31 PROCEDURE — 97166 OT EVAL MOD COMPLEX 45 MIN: CPT

## 2024-03-31 PROCEDURE — 97530 THERAPEUTIC ACTIVITIES: CPT

## 2024-03-31 PROCEDURE — 83735 ASSAY OF MAGNESIUM: CPT | Performed by: SURGERY

## 2024-03-31 PROCEDURE — 25010000002 ERTAPENEM PER 500 MG: Performed by: STUDENT IN AN ORGANIZED HEALTH CARE EDUCATION/TRAINING PROGRAM

## 2024-03-31 PROCEDURE — 25010000002 ENOXAPARIN PER 10 MG: Performed by: STUDENT IN AN ORGANIZED HEALTH CARE EDUCATION/TRAINING PROGRAM

## 2024-03-31 PROCEDURE — 0 DEXTROSE 5 % SOLUTION: Performed by: STUDENT IN AN ORGANIZED HEALTH CARE EDUCATION/TRAINING PROGRAM

## 2024-03-31 PROCEDURE — 80069 RENAL FUNCTION PANEL: CPT | Performed by: INTERNAL MEDICINE

## 2024-03-31 RX ORDER — BISACODYL 5 MG/1
5 TABLET, DELAYED RELEASE ORAL DAILY PRN
Status: DISCONTINUED | OUTPATIENT
Start: 2024-03-31 | End: 2024-04-09 | Stop reason: HOSPADM

## 2024-03-31 RX ORDER — POLYETHYLENE GLYCOL 3350 17 G/17G
17 POWDER, FOR SOLUTION ORAL DAILY
Status: DISCONTINUED | OUTPATIENT
Start: 2024-04-01 | End: 2024-04-09 | Stop reason: HOSPADM

## 2024-03-31 RX ORDER — BISACODYL 10 MG
10 SUPPOSITORY, RECTAL RECTAL DAILY PRN
Status: DISCONTINUED | OUTPATIENT
Start: 2024-03-31 | End: 2024-04-09 | Stop reason: HOSPADM

## 2024-03-31 RX ORDER — AMOXICILLIN 250 MG
2 CAPSULE ORAL DAILY
Status: DISCONTINUED | OUTPATIENT
Start: 2024-04-01 | End: 2024-04-09 | Stop reason: HOSPADM

## 2024-03-31 RX ORDER — DEXTROSE MONOHYDRATE 50 MG/ML
75 INJECTION, SOLUTION INTRAVENOUS CONTINUOUS
Status: ACTIVE | OUTPATIENT
Start: 2024-03-31 | End: 2024-03-31

## 2024-03-31 RX ADMIN — ENOXAPARIN SODIUM 40 MG: 100 INJECTION SUBCUTANEOUS at 17:51

## 2024-03-31 RX ADMIN — Medication 2 PACKET: at 17:54

## 2024-03-31 RX ADMIN — ERTAPENEM SODIUM 1000 MG: 1 INJECTION, POWDER, LYOPHILIZED, FOR SOLUTION INTRAMUSCULAR; INTRAVENOUS at 16:40

## 2024-03-31 RX ADMIN — ANORECTAL OINTMENT 1 APPLICATION: 15.7; .44; 24; 20.6 OINTMENT TOPICAL at 10:30

## 2024-03-31 RX ADMIN — ACETAMINOPHEN 325MG 650 MG: 325 TABLET ORAL at 10:30

## 2024-03-31 RX ADMIN — TAMSULOSIN HYDROCHLORIDE 0.4 MG: 0.4 CAPSULE ORAL at 23:41

## 2024-03-31 RX ADMIN — ANORECTAL OINTMENT 1 APPLICATION: 15.7; .44; 24; 20.6 OINTMENT TOPICAL at 23:41

## 2024-03-31 RX ADMIN — LANSOPRAZOLE 30 MG: 15 TABLET, ORALLY DISINTEGRATING ORAL at 05:45

## 2024-03-31 RX ADMIN — DEXTROSE MONOHYDRATE 75 ML/HR: 50 INJECTION, SOLUTION INTRAVENOUS at 10:27

## 2024-03-31 RX ADMIN — FINASTERIDE 5 MG: 5 TABLET, FILM COATED ORAL at 10:30

## 2024-03-31 NOTE — PLAN OF CARE
Goal Outcome Evaluation:   Vital signs stable.  Bedbound.  Mariscal catheter in place.  IV fluids Dc'd.  GI tube working fine, tube feeds administered, continuous.  Still on IV antibiotic.  Room air all shift.  Estimated discharge date: 2 days.

## 2024-03-31 NOTE — THERAPY EVALUATION
Patient Name: Anthony Gallegos  : 1945    MRN: [id number]                              Today's Date: 3/31/2024       Admit Date: 3/25/2024    Visit Dx:     ICD-10-CM ICD-9-CM   1. Decreased oral intake  R63.8 783.9   2. MARTITA (acute kidney injury)  N17.9 584.9   3. Whole body pain  R52 780.96   4. Dysphagia, unspecified type  R13.10 787.20   5. Dehydration  E86.0 276.51   6. Elevated troponin  R79.89 790.6   7. Failure to thrive in adult  R62.7 783.7   8. Generalized weakness  R53.1 780.79   9. Weight loss  R63.4 783.21   10. Urinary tract infection in male  N39.0 599.0     Patient Active Problem List   Diagnosis    Ankylosing spondylitis of cervical region    Recent unexplained weight loss    Abnormal serum lipase level    Abnormal serum level of amylase    Hypertension    Boil    Immobility    Fall from bed    Tetrahydrocannabinol (THC) use disorder, mild, abuse    Generalized pain        Grief    DISH (disseminated idiopathic skeletal hyperostosis)    Generalized weakness    Severe malnutrition    Altered mental status    Transient alteration of awareness    GERD without esophagitis    BPH (benign prostatic hyperplasia)    UTI (urinary tract infection)    Dehydration    MARTITA (acute kidney injury)    Hyperglycemia    Encephalopathy    Decreased oral intake    Dysphagia     Past Medical History:   Diagnosis Date    Abscess of scrotum     MARTITA (acute kidney injury)     Altered mental state     Arthritis     AS (ankylosing spondylitis)     History of MRSA infection     Hypertension     Leukocytosis     Tetrahydrocannabinol (THC) use disorder, mild, abuse 2023    Uveitis      Past Surgical History:   Procedure Laterality Date    COLONOSCOPY      ENDOSCOPY W/ PEG TUBE PLACEMENT N/A 3/29/2024    Procedure: ESOPHAGOGASTRODUODENOSCOPY WITH PERCUTANEOUS ENDOSCOPIC GASTROSTOMY TUBE INSERTION;  Surgeon: Khadar Broderick MD;  Location: [facility] ENDOSCOPY;  Service: General;  Laterality: N/A;  PRE/POST -  DYSPHAGIA    ROTATOR CUFF REPAIR Right     TOTAL HIP ARTHROPLASTY Left 2014      General Information       Row Name 03/31/24 0936          OT Time and Intention    Document Type evaluation  -PP     Mode of Treatment individual therapy;occupational therapy  -PP       Row Name 03/31/24 0936          General Information    Patient Profile Reviewed yes  -PP     Prior Level of Function --  PLOF unclear but per chart, pt was able to stand-pivot w/ assist prior to admission. Pt uses w/c for fxnl mob.  -PP     Existing Precautions/Restrictions fall  -PP     Barriers to Rehab visual deficit  photophobia and wear black shades  -PP       Row Name 03/31/24 0936          Living Environment    People in Home facility resident  -PP       Row Name 03/31/24 0936          Cognition    Orientation Status (Cognition) unable/difficult to assess;oriented to;person  Speech garbled, however, pt will groan t/o session for nonverbal communication when asked questions and follow commands.  -PP       Row Name 03/31/24 0936          Safety Issues, Functional Mobility    Impairments Affecting Function (Mobility) balance;endurance/activity tolerance;strength;range of motion (ROM);pain  -PP     Comment, Safety Issues/Impairments (Mobility) --  -PP               User Key  (r) = Recorded By, (t) = Taken By, (c) = Cosigned By      Initials Name Provider Type    PP Tracy Vega OT Occupational Therapist                     Mobility/ADL's       Row Name 03/31/24 0994          Bed Mobility    Bed Mobility supine-sit;sit-supine;scooting/bridging  -PP     Scooting/Bridging [place] (Bed Mobility) dependent (less than 25% patient effort);2 person assist  -PP     Supine-Sit [place] (Bed Mobility) maximum assist (25% patient effort);verbal cues  -PP     Sit-Supine [place] (Bed Mobility) verbal cues;2 person assist;dependent (less than 25% patient effort);maximum assist (25% patient effort)  -PP     Assistive Device (Bed Mobility) head of  bed elevated;bed rails;draw sheet  -PP       Row Name 03/31/24 0943          Transfers    Comment, (Transfers) fxnl xfers deferred at this time d/t Pt requiring Mod A for sitting bal EOB and decreased endurance  -PP       Row Name 03/31/24 0943          Functional Mobility    Functional Mobility- Ind. Level unable to perform;not tested  -PP       Row Name 03/31/24 0943          Activities of Daily Living    BADL Assessment/Intervention grooming;feeding;toileting  -PP       Row Name 03/31/24 0943          Grooming Assessment/Training    [place] Level (Grooming) grooming skills;maximum assist (25% patient effort)  -PP       Row Name 03/31/24 0943          Self-Feeding Assessment/Training    [place] Level (Feeding) dependent (less than 25% patient effort);feeding skills  -PP     Comment, (Feeding) Pt NPO and feeding tube present  -PP       Row Name 03/31/24 0943          Toileting Assessment/Training    [place] Level (Toileting) toileting skills;dependent (less than 25% patient effort)  -PP     Comment, (Toileting) forde and brief present  -PP               User Key  (r) = Recorded By, (t) = Taken By, (c) = Cosigned By      Initials Name Provider Type    PP Tracy Vega, OT Occupational Therapist                   Obj/Interventions       Row Name 03/31/24 0954          Sensory Assessment (Somatosensory)    Sensory Assessment (Somatosensory) unable/difficult to assess  -PP       Row Name 03/31/24 0954          Vision Assessment/Intervention    Visual Impairment/Limitations other (see comments);unable/difficult to assess  photophobia; wear shades  -PP       Row Name 03/31/24 0954          Range of Motion Comprehensive    General Range of Motion other (see comments)  -PP     Comment, General Range of Motion Greg elbow ROM WFL and limited shoulder ROM to 60-70 degrees.  -PP       Row Name 03/31/24 0954          Strength Comprehensive (MMT)    General Manual Muscle Testing (MMT) Assessment other (see  comments)  -PP     Comment, General Manual Muscle Testing (MMT) Assessment generalized weakness noted; difficult to assess  -PP       Row Name 03/31/24 0954          Motor Skills    Motor Skills functional endurance  -PP     Functional Endurance poor  -PP       Row Name 03/31/24 0954          Balance    Static Sitting Balance moderate assist  -PP     Position, Sitting Balance supported;sitting edge of bed  -PP     Balance Interventions sitting;static;supported;minimal challenge  -PP     Comment, Balance Pt otlerated sitting ~5 mins EOB w/ Mod A  -PP               User Key  (r) = Recorded By, (t) = Taken By, (c) = Cosigned By      Initials Name Provider Type    PP Tracy Vega, OT Occupational Therapist                   Goals/Plan       Row Name 03/31/24 1001          Bed Mobility Goal 1 (OT)    Activity/Assistive Device (Bed Mobility Goal 1, OT) bed mobility activities, all  -PP     [place] Level/Cues Needed (Bed Mobility Goal 1, OT) minimum assist (75% or more patient effort)  -PP     Time Frame (Bed Mobility Goal 1, OT) short term goal (STG);2 weeks  -PP     Progress/Outcomes (Bed Mobility Goal 1, OT) goal ongoing  -PP       Row Name 03/31/24 1001          Transfer Goal 1 (OT)    Activity/Assistive Device (Transfer Goal 1, OT) transfers, all  -PP     [place] Level/Cues Needed (Transfer Goal 1, OT) moderate assist (50-74% patient effort)  -PP     Time Frame (Transfer Goal 1, OT) short term goal (STG);2 weeks  -PP     Progress/Outcome (Transfer Goal 1, OT) goal ongoing  -PP       Row Name 03/31/24 1001          Grooming Goal 1 (OT)    Activity/Device (Grooming Goal 1, OT) grooming skills, all  -PP     [place] (Grooming Goal 1, OT) set-up required  -PP     Time Frame (Grooming Goal 1, OT) short term goal (STG);2 weeks  -PP     Progress/Outcome (Grooming Goal 1, OT) goal ongoing  -PP       Row Name 03/31/24 1001          Therapy Assessment/Plan (OT)    Planned Therapy Interventions (OT) activity  tolerance training;BADL retraining;functional balance retraining;occupation/activity based interventions;patient/caregiver education/training;strengthening exercise;transfer/mobility retraining  -PP               User Key  (r) = Recorded By, (t) = Taken By, (c) = Cosigned By      Initials Name Provider Type    PP Tracy Vega OT Occupational Therapist                   Clinical Impression       Row Name 03/31/24 0956          Pain Assessment    Pre/Posttreatment Pain Comment Pt groans t/o session, during bed mob and especially when LUE is moved, but difficult to assess  -PP       Row Name 03/31/24 0956          Plan of Care Review    Plan of Care Reviewed With patient  -PP     Progress no change  -PP     Outcome Evaluation Pt is a 80 y/o male admitted to [facility] on 3/25/24 for UTI w/ Pmhx sig of arthritis on remicade, HTN, and gerd who presents to the ED c/o acute generalized weakness, moaning in pain frequently, diarrhea, and decreased oral intake. Per chart, pt from facility and was able to stand-pivot xfer w/ assist prior to admission. Pt also uses w/c for fxnl mob at . Today, pt confused and oriented to self only. Pt presents w/ garbled speech, but follows commands. He was Max A for sup to sit and maintained sitting EOB ~5 mins w/ Mod A before requesting to return to supine. He was Dep x2 w/ assist from RN for sit to supine. While sitting EOB he would not put weight through LUE but able to put weight through RUE. He demo'd Greg elbow ROM WFL and limited shoulder ROM to 60-70 degrees. He has deficits in bal, strength, ROM, endurance, and pain, req skilled OT to address. Pt rec to d/c to SNF and OT will continue to monitor.  -PP       Row Name 03/31/24 0956          Therapy Assessment/Plan (OT)    Patient/Family Therapy Goal Statement (OT) none stated  -PP     Rehab Potential (OT) fair, will monitor progress closely  -PP     Criteria for Skilled Therapeutic Interventions Met (OT) yes;skilled treatment is  necessary  -PP     Therapy Frequency (OT) 3 times/wk  -PP       Row Name 03/31/24 0956          Therapy Plan Review/Discharge Plan (OT)    Anticipated Discharge Disposition (OT) skilled nursing facility  -PP       Row Name 03/31/24 0956          Vital Signs    O2 Delivery Pre Treatment room air  -PP     Pre Patient Position Supine  -PP     Intra Patient Position Sitting  -PP     Post Patient Position Supine  -PP       Row Name 03/31/24 0956          Positioning and Restraints    Pre-Treatment Position in bed  -PP     Post Treatment Position bed  -PP     In Bed fowlers;notified nsg;call light within reach;encouraged to call for assist;exit alarm on  -PP               User Key  (r) = Recorded By, (t) = Taken By, (c) = Cosigned By      Initials Name Provider Type    PP Tracy Vega OT Occupational Therapist                   Outcome Measures       Row Name 03/31/24 1004          How much help from another is currently needed...    Putting on and taking off regular lower body clothing? 1  -PP     Bathing (including washing, rinsing, and drying) 1  -PP     Toileting (which includes using toilet bed pan or urinal) 1  forde and brief  -PP     Putting on and taking off regular upper body clothing 1  -PP     Taking care of personal grooming (such as brushing teeth) 2  -PP     Eating meals 2  NG tube  -PP     AM-PAC 6 Clicks Score (OT) 8  -PP       Row Name 03/31/24 1004          Functional Assessment    Outcome Measure Options AM-PAC 6 Clicks Daily Activity (OT)  -PP               User Key  (r) = Recorded By, (t) = Taken By, (c) = Cosigned By      Initials Name Provider Type    PP Tracy Vega OT Occupational Therapist                    Occupational Therapy Education       Title: PT OT SLP Therapies (In Progress)       Topic: Occupational Therapy (In Progress)       Point: ADL training (Done)       Description:   Instruct learner(s) on proper safety adaptation and remediation techniques during self care or  transfers.   Instruct in proper use of assistive devices.                  Learning Progress Summary             Patient Acceptance, E, VU,NR by PP at 3/31/2024 1008    Comment: Pt Ed on OT role, d/c planning and call light function. Difficult to assess understanding.                         Point: Home exercise program (Not Started)       Description:   Instruct learner(s) on appropriate technique for monitoring, assisting and/or progressing therapeutic exercises/activities.                  Learner Progress:  Not documented in this visit.              Point: Precautions (Not Started)       Description:   Instruct learner(s) on prescribed precautions during self-care and functional transfers.                  Learner Progress:  Not documented in this visit.              Point: Body mechanics (Not Started)       Description:   Instruct learner(s) on proper positioning and spine alignment during self-care, functional mobility activities and/or exercises.                  Learner Progress:  Not documented in this visit.                              User Key       Initials Effective Dates Name Provider Type Discipline     06/09/23 -  Tracy Vega OT Occupational Therapist OT                  OT Recommendation and Plan  Planned Therapy Interventions (OT): activity tolerance training, BADL retraining, functional balance retraining, occupation/activity based interventions, patient/caregiver education/training, strengthening exercise, transfer/mobility retraining  Therapy Frequency (OT): 3 times/wk  Plan of Care Review  Plan of Care Reviewed With: patient  Progress: no change  Outcome Evaluation: Pt is a 80 y/o male admitted to [facility] on 3/25/24 for UTI w/ Pmhx sig of arthritis on remicade, HTN, and gerd who presents to the ED c/o acute generalized weakness, moaning in pain frequently, diarrhea, and decreased oral intake. Per chart, pt from facility and was able to stand-pivot xfer w/ assist prior to admission. Pt  also uses w/c for fxnl mob at [facility]. Today, pt confused and oriented to self only. Pt presents w/ garbled speech, but follows commands. He was Max A for sup to sit and maintained sitting EOB ~5 mins w/ Mod A before requesting to return to supine. He was Dep x2 w/ assist from RN for sit to supine. While sitting EOB he would not put weight through LUE but able to put weight through RUE. He demo'd Greg elbow ROM WFL and limited shoulder ROM to 60-70 degrees. He has deficits in bal, strength, ROM, endurance, and pain, req skilled OT to address. Pt rec to d/c to SNF and OT will continue to monitor.     Time Calculation:   Evaluation Complexity (OT)  Review Occupational Profile/Medical/Therapy History Complexity: expanded/moderate complexity  Assessment, Occupational Performance/Identification of Deficit Complexity: 3-5 performance deficits  Clinical Decision Making Complexity (OT): detailed assessment/moderate complexity  Overall Complexity of Evaluation (OT): moderate complexity     Time Calculation- OT       Row Name 03/31/24 1006             Time Calculation- OT    OT Start Time 0822  -PP      OT Stop Time 0845  -PP      OT Time Calculation (min) 23 min  -PP      Total Timed Code Minutes- OT 17 minute(s)  -PP      OT Received On 03/31/24  -PP      OT - Next Appointment 04/02/24  -PP      OT Goal Re-Cert Due Date 04/15/24  -PP         Timed Charges    26816 - OT Therapeutic Activity Minutes 17  -PP         Untimed Charges    OT Eval/Re-eval Minutes 6  -PP         Total Minutes    Timed Charges Total Minutes 17  -PP      Untimed Charges Total Minutes 6  -PP       Total Minutes 23  -PP                User Key  (r) = Recorded By, (t) = Taken By, (c) = Cosigned By      Initials Name Provider Type    PP Tracy Vega OT Occupational Therapist                  Therapy Charges for Today       Code Description Service Date Service Provider Modifiers Qty    [id number]  OT THERAPEUTIC ACT EA 15 MIN 3/31/2024 Silvia  Tracy OT GO 1    [id number]  OT EVAL MOD COMPLEXITY 3 3/31/2024 Tracy Vega OT GO 1                 Tracy Vega OT  3/31/2024

## 2024-03-31 NOTE — PLAN OF CARE
Goal Outcome Evaluation:  Plan of Care Reviewed With: patient        Progress: no change  Outcome Evaluation: Pt is a 80 y/o male admitted to [facility] on 3/25/24 for UTI w/ Pmhx sig of arthritis on remicade, HTN, and gerd who presents to the ED c/o acute generalized weakness, moaning in pain frequently, diarrhea, and decreased oral intake. Per chart, pt from facility and was able to stand-pivot xfer w/ assist prior to admission. Pt also uses w/c for fxnl mob at [facility]. Today, pt confused and oriented to self only. Pt presents w/ garbled speech, but follows commands. He was Max A for sup to sit and maintained sitting EOB ~5 mins w/ Mod A before requesting to return to supine. He was Dep x2 w/ assist from RN for sit to supine. While sitting EOB he would not put weight through LUE but able to put weight through RUE. He demo'd Greg elbow ROM WFL and limited shoulder ROM to 60-70 degrees. He has deficits in bal, strength, ROM, endurance, and pain, req skilled OT to address. Pt rec to d/c to SNF and OT will continue to monitor.      Anticipated Discharge Disposition (OT): skilled nursing facility

## 2024-03-31 NOTE — PLAN OF CARE
Goal Outcome Evaluation:              Outcome Evaluation: VSS. HAD SEVERAL B.M.S OVERNIGHT. OTHERWISE UNEVENTFUL NIGHT.

## 2024-03-31 NOTE — PROGRESS NOTES
[facility] Progress Note      Patient Name: Anthony Gallegos  : 1945  MRN: [id number]  Primary Care Physician:  Provider, No Known  Date of admission: 3/25/2024    Subjective     Interval History:   F/u MARTITA    Review of Systems:   Water flush deliver is less than intended (50 cc q4h but ordered as hourly)  D5W also started    Objective     Vitals:   Temp:  [97.5 °F (36.4 °C)-98.4 °F (36.9 °C)] 97.5 °F (36.4 °C)  Heart Rate:  [83-90] 90  Resp:  [18] 18  BP: (116-134)/(64-71) 117/64    Intake/Output Summary (Last 24 hours) at 3/31/2024 1326  Last data filed at 3/31/2024 1030  Gross per 24 hour   Intake 881 ml   Output 1450 ml   Net -569 ml       Physical Exam:    General Appearance: frail cachectic & confused  Neck: supple, no JVD  Lungs: CTA bilat no rales  Heart: RRR, normal S1 and S2  Abdomen: soft, nontender, +PEG  : forde  Extremities: no edema, cyanosis or clubbing    Scheduled Meds:     bisacodyl, 10 mg, Rectal, Once  enoxaparin, 40 mg, Subcutaneous, Q24H  ertapenem, 1,000 mg, Intravenous, Q24H  finasteride, 5 mg, Oral, Daily  lansoprazole, 30 mg, Per G Tube, Q AM  Menthol-Zinc Oxide, 1 Application, Topical, Q12H  [START ON 2024] polyethylene glycol, 17 g, Per G Tube, Daily  potassium & sodium phosphates, 2 packet, Per G Tube, BID AC  [START ON 2024] senna-docusate sodium, 2 tablet, Per G Tube, Daily  tamsulosin, 0.4 mg, Oral, Nightly      IV Meds:   dextrose, 75 mL/hr, Last Rate: 75 mL/hr (24 1027)  Pharmacy to Dose enoxaparin (LOVENOX),         Results Reviewed:   I have personally reviewed the results from the time of this admission to 3/31/2024 13:26 EDT     Results from last 7 days   Lab Units 24  0349 249 24  1755 24  1013 24  0305 24  0345 24  0406 24  1723   SODIUM mmol/L 149*  --   --  148* 149* 151*   < > 140   POTASSIUM mmol/L 3.8 4.4 3.6 3.2* 3.3* 3.4*   < > 4.9   CHLORIDE mmol/L 116*  --   --   113* 118* 118*   < > 101   CO2 mmol/L 24.2  --   --  25.0 21.9* 23.1   < > 24.0   BUN mg/dL 27*  --   --  28* <2* 50*   < > 82*   CREATININE mg/dL 0.99  --   --  0.98 0.93 1.69*   < > 2.50*   CALCIUM mg/dL 8.2*  --   --  8.3* 8.3* 8.5*   < > 9.7   BILIRUBIN mg/dL  --   --   --   --   --  0.7  --  0.6   ALK PHOS U/L  --   --   --   --   --  84  --  112   ALT (SGPT) U/L  --   --   --   --   --  10  --  15   AST (SGOT) U/L  --   --   --   --   --  25  --  16   GLUCOSE mg/dL 96  --   --  100* 118* 126*   < > 125*    < > = values in this interval not displayed.     Estimated Creatinine Clearance: 69.6 mL/min (by C-G formula based on SCr of 0.99 mg/dL).  Results from last 7 days   Lab Units 03/31/24  0349 03/30/24  0305 03/29/24  0345   MAGNESIUM mg/dL 2.1 2.2 2.7*   PHOSPHORUS mg/dL 2.6 2.0* 2.9         Results from last 7 days   Lab Units 03/31/24  0349 03/30/24  0305 03/29/24  0345 03/28/24  0757 03/27/24  1015   WBC 10*3/mm3 10.93* 10.45 11.18* 10.45 14.65*   HEMOGLOBIN g/dL 11.1* 11.1* 11.4* 10.6* 11.8*   PLATELETS 10*3/mm3 422 467* 481* 478* 389           Assessment / Plan     ASSESSMENT:  Acute kidney injury on CKD 2 with very poor muscle mass, acute component resolved, Cr 1 (his approx [facility]).  MARTITA likely due to obstruction, volume depletion.  Nephrotic-range proteinuria by ratio in January  Hypernatremia due to free water deficit - Na same 149 but getting less water flush volume than intended, d/w RN   E. coli urinary tract infection on Zosyn  BPH; Mariscal catheter in place  Failure to thrive; PEG placed 3/29  Dysphagia with recent aspiration  Anemia    PLAN:  Inc water flushes 250 cc q6h  Agree with D5W 75 cc/hr, limit duration to 1L  Note plan for DC to rehab soon  D/W TERESA Hassan MD  03/31/24  13:26 EDT    [facility]  [phone]

## 2024-03-31 NOTE — PROGRESS NOTES
Name: Anthony Gallegos ADMIT: 3/25/2024   : 1945  PCP: Provider, No Known    MRN: [id number] LOS: 6 days   AGE/SEX: 79 y.o. male  ROOM: [facility]     Subjective   Subjective   Patient laying in bed, no acute events overnight.  Discussed with RN, patient had multiple bowel movements, approximately 3.  Related to name, place, not to year.  No specific complaints.    Review of Systems   As above  Objective   Objective   Vital Signs  Temp:  [97.5 °F (36.4 °C)-98.4 °F (36.9 °C)] 97.5 °F (36.4 °C)  Heart Rate:  [83-90] 90  Resp:  [18] 18  BP: (116-134)/(64-71) 117/64  SpO2:  [97 %-100 %] 100 %  on   ;   Device (Oxygen Therapy): room air  Body mass index is 22.8 kg/m².  Physical Exam    General: Laying in bed, oriented to name and place, not to year, follows commands, chronically ill-appearing  HEENT: Normocephalic, atraumatic  CV: Regular rate and rhythm, no murmurs rubs   Lungs: CTA, no wheezing   Abdomen: Soft, PEG tube present, abdominal binder  Extremities: No significant peripheral edema , no cyanosis, generalized weakness    Results Review     I reviewed the patient's new clinical results.  Results from last 7 days   Lab Units 24  03424  0305 24  0345 24  0757   WBC 10*3/mm3 10.93* 10.45 11.18* 10.45   HEMOGLOBIN g/dL 11.1* 11.1* 11.4* 10.6*   PLATELETS 10*3/mm3 422 467* 481* 478*     Results from last 7 days   Lab Units 24  0349 24  2119 24  1755 24  1013 24  0305 24  0345   SODIUM mmol/L 149*  --   --  148* 149* 151*   POTASSIUM mmol/L 3.8 4.4 3.6 3.2* 3.3* 3.4*   CHLORIDE mmol/L 116*  --   --  113* 118* 118*   CO2 mmol/L 24.2  --   --  25.0 21.9* 23.1   BUN mg/dL 27*  --   --  28* <2* 50*   CREATININE mg/dL 0.99  --   --  0.98 0.93 1.69*   GLUCOSE mg/dL 96  --   --  100* 118* 126*   Estimated Creatinine Clearance: 69.6 mL/min (by C-G formula based on SCr of 0.99 mg/dL).  Results from last 7 days   Lab Units 24  0349 24  5393  03/29/24  0345 03/28/24  0757 03/25/24  1723   ALBUMIN g/dL 2.3* 2.2* 2.5* 2.5* 3.0*   BILIRUBIN mg/dL  --   --  0.7  --  0.6   ALK PHOS U/L  --   --  84  --  112   AST (SGOT) U/L  --   --  25  --  16   ALT (SGPT) U/L  --   --  10  --  15     Results from last 7 days   Lab Units 03/31/24  0349 03/30/24  1013 03/30/24  0305 03/29/24  0345 03/28/24  0757   CALCIUM mg/dL 8.2* 8.3* 8.3* 8.5* 8.5*   ALBUMIN g/dL 2.3*  --  2.2* 2.5* 2.5*   MAGNESIUM mg/dL 2.1  --  2.2 2.7* 3.3*   PHOSPHORUS mg/dL 2.6  --  2.0* 2.9 3.5     Results from last 7 days   Lab Units 03/25/24  2303 03/25/24  1956 03/25/24  1723   PROCALCITONIN ng/mL  --   --  3.36*   LACTATE mmol/L 1.8 2.1* 2.4*     COVID19   Date Value Ref Range Status   03/25/2024 Not Detected Not Detected - Ref. Range Final     Glucose   Date/Time Value Ref Range Status   03/30/2024 1716 88 70 - 130 mg/dL Final   03/30/2024 0755 97 70 - 130 mg/dL Final   03/30/2024 0621 105 70 - 130 mg/dL Final   03/29/2024 2351 91 70 - 130 mg/dL Final   03/29/2024 1746 86 70 - 130 mg/dL Final   03/29/2024 1620 99 70 - 130 mg/dL Final   03/29/2024 1301 56 (L) 70 - 130 mg/dL Final           MRI Brain Without Contrast  Narrative: BRAIN MRI WITHOUT CONTRAST     HISTORY: Mental status change, unknown cause; R63.8-Other symptoms and  signs concerning food and fluid intake; N17.9-Acute kidney failure,  unspecified; R52-Pain, unspecified; R13.10-Dysphagia, unspecified;  E86.0-Dehydration; R79.89-Other specified abnormal findings of blood  chemistry; R62.7-Adult failure to thrive; R53.1-Weakness; R63.4-Abnormal  weight loss; N39.0-Urinary tract infection, site not specifi     COMPARISON: March 25, 2024.     FINDINGS:  Multiplanar images of the head were obtained without  gadolinium. No areas of restricted diffusion are seen to suggest acute  infarct. There is atrophy. There is extensive periventricular and deep  white matter microangiopathic change. There is no midline shift or mass  effect.  Intracranial flow voids appear intact. Old lacunar infarcts are  noted within the left basal ganglia. No abnormality is seen on gradient  echo imaging. There may be some mucosal thickening within the ethmoid  sinuses.     Impression: 1. No acute intracranial abnormality.     This report was finalized on 3/29/2024 9:26 PM by Dr. Kirsten Peña M.D on Workstation: [id number]       Scheduled Medications  bisacodyl, 10 mg, Rectal, Once  enoxaparin, 40 mg, Subcutaneous, Q24H  ertapenem, 1,000 mg, Intravenous, Q24H  finasteride, 5 mg, Oral, Daily  lansoprazole, 30 mg, Per G Tube, Q AM  Menthol-Zinc Oxide, 1 Application, Topical, Q12H  [START ON 4/1/2024] polyethylene glycol, 17 g, Per G Tube, Daily  [START ON 4/1/2024] senna-docusate sodium, 2 tablet, Per G Tube, Daily  tamsulosin, 0.4 mg, Oral, Nightly    Infusions  Pharmacy to Dose enoxaparin (LOVENOX),     Diet  NPO Diet NPO Type: Strict NPO    I have personally reviewed     [x]  Laboratory   []  Microbiology   []  Radiology   []  EKG/Telemetry  []  Cardiology/Vascular   []  Pathology    []  Records       Assessment/Plan     Active Hospital Problems    Diagnosis  POA    **UTI (urinary tract infection) [N39.0]  Yes    Encephalopathy [G93.40]  Unknown    Decreased oral intake [R63.8]  Unknown    Dysphagia [R13.10]  Unknown    MARTITA (acute kidney injury) [N17.9]  Yes    Severe malnutrition [E43]  Yes    Hypertension [I10]  Yes      Resolved Hospital Problems   No resolved problems to display.       Patient is a 78 y.o. male with a history of HTN, BPH pyelonephritis/UTI, who presented to [facility] from facility for failure to thrive, not eating or drinking much, weight loss, decreased mobility.    ESBL UTI  -Initially treated with IV Zosyn, urine culture came back positive for ESBL, ID consulted, patient transition to IV ertapenem 1 g daily through 04/1 per ID recommendations.  -    MARTITA/bilateral hydronephrosis/hypernatremia  -Creatinine 2.5 on  admission, up from 0.66 on 01/29/2024  -CT scan showed mild-to-moderate bilateral hydronephrosis   -Status post Mariscal catheter placement 03/26/2024 per urology  -Creatinine improved, remained stable around 1  -Sodium remains high, 149.  Initiate D5W.  -Monitor daily BMP  -Mariscal catheter remains in place, will need voiding trial prior to discharge    Hypokalemia/hypophosphatemia  -Potassium normal, phosphorus low end of normal.  -Ordered phosphorus replacement.      Encephalopathy  -Likely secondary to UTI/dehydration/malnutrition  -No history of dementia per daughter, at baseline oriented x 3  -CT head with no acute findings  --Follow-up MRI brain 03/29/2024 showed old stroke but no acute findings  -Follow-up MRI brain 03/29/2024 showed old stroke but no acute findings  -Improving, alert, oriented x 2       Failure to thrive/malnutrition/decreased p.o. intake  -Core track and tube feeds ordered however patient refused  -speech therapy  reevaluated, pending underwent VFSS 03/28/2024, found to have diffuse to be profound generalized pharyngeal weakness, silent aspiration.  Strict n.p.o. recommended  -Palliative care evaluated for goals of care discussion, family wantsed to proceed with PEG tube placement.  -Status post PEG tube placement 03/29  -Continue tube feeds, tolerating    Constipation  -CT scan showed moderate gaseous distention of the colon with moderate amount of  stool in the right colon and rectum.  -Patient refused suppository previously  -Status post Fleet enema 03/29, small BM afterwards  -Had multiple bowel movements overnight 03/30-03/31      Hypertension  -BP acutely stable  -Holding outpatient Tenoretic    Anemia  -Hemoglobin stable  -Iron panel consistent with anemia of chronic disease       DVT prophylaxis.  Lovenox  Full code.  Patient palliative care appropriate.  Palliative care evaluated 03/28 plan to continue current-treatment, remains full code.  Anticipate discharge SNF, timing to be  determined, likely stable to be discharged tomorrow.      Copied text in this note has been reviewed and is accurate as of 03/31/24.         Dictated utilizing Dragon dictation        Nomi Osman MD  [facility]  03/31/24  09:21 EDT

## 2024-04-01 LAB
ALBUMIN SERPL-MCNC: 2.4 G/DL (ref 3.5–5.2)
ANION GAP SERPL CALCULATED.3IONS-SCNC: 7.9 MMOL/L (ref 5–15)
BACTERIA ISLT: NORMAL
BASOPHILS # BLD AUTO: 0.06 10*3/MM3 (ref 0–0.2)
BASOPHILS NFR BLD AUTO: 0.8 % (ref 0–1.5)
BUN SERPL-MCNC: 33 MG/DL (ref 8–23)
BUN/CREAT SERPL: 34 (ref 7–25)
CALCIUM SPEC-SCNC: 8.1 MG/DL (ref 8.6–10.5)
CHLORIDE SERPL-SCNC: 115 MMOL/L (ref 98–107)
CO2 SERPL-SCNC: 26.1 MMOL/L (ref 22–29)
CREAT SERPL-MCNC: 0.97 MG/DL (ref 0.76–1.27)
DEPRECATED RDW RBC AUTO: 40.1 FL (ref 37–54)
EGFRCR SERPLBLD CKD-EPI 2021: 78.9 ML/MIN/1.73
EOSINOPHIL # BLD AUTO: 0.28 10*3/MM3 (ref 0–0.4)
EOSINOPHIL NFR BLD AUTO: 3.6 % (ref 0.3–6.2)
ERYTHROCYTE [DISTWIDTH] IN BLOOD BY AUTOMATED COUNT: 13.1 % (ref 12.3–15.4)
GLUCOSE BLDC GLUCOMTR-MCNC: 103 MG/DL (ref 70–130)
GLUCOSE BLDC GLUCOMTR-MCNC: 108 MG/DL (ref 70–130)
GLUCOSE BLDC GLUCOMTR-MCNC: 112 MG/DL (ref 70–130)
GLUCOSE BLDC GLUCOMTR-MCNC: 98 MG/DL (ref 70–130)
GLUCOSE SERPL-MCNC: 102 MG/DL (ref 65–99)
HCT VFR BLD AUTO: 31.8 % (ref 37.5–51)
HGB BLD-MCNC: 10.3 G/DL (ref 13–17.7)
IMM GRANULOCYTES # BLD AUTO: 0.04 10*3/MM3 (ref 0–0.05)
IMM GRANULOCYTES NFR BLD AUTO: 0.5 % (ref 0–0.5)
LYMPHOCYTES # BLD AUTO: 2.04 10*3/MM3 (ref 0.7–3.1)
LYMPHOCYTES NFR BLD AUTO: 26.3 % (ref 19.6–45.3)
MAGNESIUM SERPL-MCNC: 1.9 MG/DL (ref 1.6–2.4)
MCH RBC QN AUTO: 27.5 PG (ref 26.6–33)
MCHC RBC AUTO-ENTMCNC: 32.4 G/DL (ref 31.5–35.7)
MCV RBC AUTO: 85 FL (ref 79–97)
MONOCYTES # BLD AUTO: 0.42 10*3/MM3 (ref 0.1–0.9)
MONOCYTES NFR BLD AUTO: 5.4 % (ref 5–12)
NEUTROPHILS NFR BLD AUTO: 4.91 10*3/MM3 (ref 1.7–7)
NEUTROPHILS NFR BLD AUTO: 63.4 % (ref 42.7–76)
NRBC BLD AUTO-RTO: 0 /100 WBC (ref 0–0.2)
PHOSPHATE SERPL-MCNC: 2.2 MG/DL (ref 2.5–4.5)
PLATELET # BLD AUTO: 383 10*3/MM3 (ref 140–450)
PMV BLD AUTO: 9.4 FL (ref 6–12)
POTASSIUM SERPL-SCNC: 3.4 MMOL/L (ref 3.5–5.2)
POTASSIUM SERPL-SCNC: 3.9 MMOL/L (ref 3.5–5.2)
RBC # BLD AUTO: 3.74 10*6/MM3 (ref 4.14–5.8)
SODIUM SERPL-SCNC: 149 MMOL/L (ref 136–145)
WBC NRBC COR # BLD AUTO: 7.75 10*3/MM3 (ref 3.4–10.8)

## 2024-04-01 PROCEDURE — 82948 REAGENT STRIP/BLOOD GLUCOSE: CPT

## 2024-04-01 PROCEDURE — 83735 ASSAY OF MAGNESIUM: CPT | Performed by: SURGERY

## 2024-04-01 PROCEDURE — 25010000002 ERTAPENEM PER 500 MG: Performed by: STUDENT IN AN ORGANIZED HEALTH CARE EDUCATION/TRAINING PROGRAM

## 2024-04-01 PROCEDURE — 84132 ASSAY OF SERUM POTASSIUM: CPT | Performed by: STUDENT IN AN ORGANIZED HEALTH CARE EDUCATION/TRAINING PROGRAM

## 2024-04-01 PROCEDURE — 85025 COMPLETE CBC W/AUTO DIFF WBC: CPT | Performed by: SURGERY

## 2024-04-01 PROCEDURE — 97530 THERAPEUTIC ACTIVITIES: CPT

## 2024-04-01 PROCEDURE — 80069 RENAL FUNCTION PANEL: CPT | Performed by: INTERNAL MEDICINE

## 2024-04-01 PROCEDURE — 25010000002 ENOXAPARIN PER 10 MG: Performed by: STUDENT IN AN ORGANIZED HEALTH CARE EDUCATION/TRAINING PROGRAM

## 2024-04-01 RX ORDER — ACETAMINOPHEN 325 MG/1
650 TABLET ORAL EVERY 4 HOURS PRN
Status: DISCONTINUED | OUTPATIENT
Start: 2024-04-01 | End: 2024-04-09 | Stop reason: HOSPADM

## 2024-04-01 RX ORDER — TERAZOSIN 1 MG/1
1 CAPSULE ORAL NIGHTLY
Status: DISCONTINUED | OUTPATIENT
Start: 2024-04-01 | End: 2024-04-09 | Stop reason: HOSPADM

## 2024-04-01 RX ORDER — UREA 10 %
3 LOTION (ML) TOPICAL NIGHTLY PRN
Status: DISCONTINUED | OUTPATIENT
Start: 2024-04-01 | End: 2024-04-09 | Stop reason: HOSPADM

## 2024-04-01 RX ORDER — POTASSIUM CHLORIDE 1.5 G/1.58G
40 POWDER, FOR SOLUTION ORAL ONCE
Status: DISCONTINUED | OUTPATIENT
Start: 2024-04-01 | End: 2024-04-01

## 2024-04-01 RX ORDER — POTASSIUM CHLORIDE 1.5 G/1.58G
40 POWDER, FOR SOLUTION ORAL EVERY 4 HOURS
Status: COMPLETED | OUTPATIENT
Start: 2024-04-01 | End: 2024-04-01

## 2024-04-01 RX ADMIN — POLYETHYLENE GLYCOL 3350 17 G: 17 POWDER, FOR SOLUTION ORAL at 11:09

## 2024-04-01 RX ADMIN — POTASSIUM CHLORIDE 40 MEQ: 1.5 POWDER, FOR SOLUTION ORAL at 11:09

## 2024-04-01 RX ADMIN — ANORECTAL OINTMENT 1 APPLICATION: 15.7; .44; 24; 20.6 OINTMENT TOPICAL at 13:10

## 2024-04-01 RX ADMIN — TERAZOSIN HYDROCHLORIDE 1 MG: 1 CAPSULE ORAL at 22:04

## 2024-04-01 RX ADMIN — DOCUSATE SODIUM 50MG AND SENNOSIDES 8.6MG 2 TABLET: 8.6; 5 TABLET, FILM COATED ORAL at 11:08

## 2024-04-01 RX ADMIN — ERTAPENEM SODIUM 1000 MG: 1 INJECTION, POWDER, LYOPHILIZED, FOR SOLUTION INTRAMUSCULAR; INTRAVENOUS at 15:22

## 2024-04-01 RX ADMIN — ANORECTAL OINTMENT 1 APPLICATION: 15.7; .44; 24; 20.6 OINTMENT TOPICAL at 22:05

## 2024-04-01 RX ADMIN — LANSOPRAZOLE 30 MG: 15 TABLET, ORALLY DISINTEGRATING ORAL at 06:30

## 2024-04-01 RX ADMIN — POTASSIUM CHLORIDE 40 MEQ: 1.5 POWDER, FOR SOLUTION ORAL at 15:23

## 2024-04-01 RX ADMIN — ENOXAPARIN SODIUM 40 MG: 100 INJECTION SUBCUTANEOUS at 17:54

## 2024-04-01 NOTE — PLAN OF CARE
Goal Outcome Evaluation:   Vital signs stable.  Not out of bed this shift.  Speech garbled.  On room air all shift.  Tube feeds advanced  to goal rate per orders, water flushes adjusted per nephrology orders.  1 liter of IV fluid started, still on IV antibiotic.  Mariscal catheter still in place.  Estimated discharge date: tomorrow.

## 2024-04-01 NOTE — PLAN OF CARE
Goal Outcome Evaluation:  Plan of Care Reviewed With: patient        Progress: no change  Outcome Evaluation: Pt supine in bed at start of session and agreeable to work with PT. Pt having difficulty communicating, garbled speech throughout session. Pt req's maxA x2 for bed mobility, immediatley upon sitting up, pt grabs throat and demo's universal sign for choking. Pt returned to bed, shaw's position, vitals stable throughout, and RN notified to assess pt. RN enters room, pt no longer indicating choking, and SpO2 and HR WNL. Pt was repositioned, will continue to progress with PT as able.

## 2024-04-01 NOTE — THERAPY TREATMENT NOTE
Patient Name: Anthony Gallegos  : 1945    MRN: [id number]                              Today's Date: 2024       Admit Date: 3/25/2024    Visit Dx:     ICD-10-CM ICD-9-CM   1. Decreased oral intake  R63.8 783.9   2. MARTITA (acute kidney injury)  N17.9 584.9   3. Whole body pain  R52 780.96   4. Dysphagia, unspecified type  R13.10 787.20   5. Dehydration  E86.0 276.51   6. Elevated troponin  R79.89 790.6   7. Failure to thrive in adult  R62.7 783.7   8. Generalized weakness  R53.1 780.79   9. Weight loss  R63.4 783.21   10. Urinary tract infection in male  N39.0 599.0     Patient Active Problem List   Diagnosis    Ankylosing spondylitis of cervical region    Recent unexplained weight loss    Abnormal serum lipase level    Abnormal serum level of amylase    Hypertension    Boil    Immobility    Fall from bed    Tetrahydrocannabinol (THC) use disorder, mild, abuse    Generalized pain        Grief    DISH (disseminated idiopathic skeletal hyperostosis)    Generalized weakness    Severe malnutrition    Altered mental status    Transient alteration of awareness    GERD without esophagitis    BPH (benign prostatic hyperplasia)    UTI (urinary tract infection)    Dehydration    MARTITA (acute kidney injury)    Hyperglycemia    Encephalopathy    Decreased oral intake    Dysphagia     Past Medical History:   Diagnosis Date    Abscess of scrotum     MARTITA (acute kidney injury)     Altered mental state     Arthritis     AS (ankylosing spondylitis)     History of MRSA infection     Hypertension     Leukocytosis     Tetrahydrocannabinol (THC) use disorder, mild, abuse 2023    Uveitis      Past Surgical History:   Procedure Laterality Date    COLONOSCOPY      ENDOSCOPY W/ PEG TUBE PLACEMENT N/A 3/29/2024    Procedure: ESOPHAGOGASTRODUODENOSCOPY WITH PERCUTANEOUS ENDOSCOPIC GASTROSTOMY TUBE INSERTION;  Surgeon: Khadar Broderick MD;  Location: [facility] ENDOSCOPY;  Service: General;  Laterality: N/A;  PRE/POST -  DYSPHAGIA    ROTATOR CUFF REPAIR Right     TOTAL HIP ARTHROPLASTY Left 2014      General Information       Row Name 04/01/24 1522          Physical Therapy Time and Intention    Document Type therapy note (daily note)  -DB     Mode of Treatment individual therapy;physical therapy  -DB       Row Name 04/01/24 1522          General Information    Patient Profile Reviewed yes  -DB     Existing Precautions/Restrictions fall;NPO  -DB               User Key  (r) = Recorded By, (t) = Taken By, (c) = Cosigned By      Initials Name Provider Type    DB Felipa Weathers PT Physical Therapist                   Mobility       Row Name 04/01/24 1522          Bed Mobility    Bed Mobility supine-sit;sit-supine;scooting/bridging  -DB     Scooting/Bridging [place] (Bed Mobility) dependent (less than 25% patient effort);2 person assist  -DB     Supine-Sit [place] (Bed Mobility) maximum assist (25% patient effort);verbal cues;2 person assist;nonverbal cues (demo/gesture)  -DB     Sit-Supine [place] (Bed Mobility) verbal cues;2 person assist;maximum assist (25% patient effort);nonverbal cues (demo/gesture)  -DB     Assistive Device (Bed Mobility) head of bed elevated;bed rails;draw sheet  -DB               User Key  (r) = Recorded By, (t) = Taken By, (c) = Cosigned By      Initials Name Provider Type    Felipa Chang PT Physical Therapist                   Obj/Interventions       Row Name 04/01/24 1522          Balance    Balance Assessment sitting static balance  -DB     Static Sitting Balance moderate assist  -DB     Position, Sitting Balance unsupported;sitting edge of bed  -DB     Balance Interventions sitting  -DB               User Key  (r) = Recorded By, (t) = Taken By, (c) = Cosigned By      Initials Name Provider Type    Felipa Chang, GERI Physical Therapist                   Goals/Plan    No documentation.                  Clinical Impression       Row Name 04/01/24 1523          Pain    Pain  Intervention(s) Ambulation/increased activity;Repositioned  -DB       Row Name 04/01/24 1523          Plan of Care Review    Plan of Care Reviewed With patient  -DB     Progress no change  -DB     Outcome Evaluation Pt supine in bed at start of session and agreeable to work with PT. Pt having difficulty communicating, garbled speech throughout session. Pt req's maxA x2 for bed mobility, immediatley upon sitting up, pt grabs throat and demo's universal sign for choking. Pt returned to bed, shaw's position, vitals stable throughout, and RN notified to assess pt. RN enters room, pt no longer indicating choking, and SpO2 and HR WNL. Pt was repositioned, will continue to progress with PT as able.  -DB       Row Name 04/01/24 1523          Vital Signs    O2 Delivery Pre Treatment room air  -DB     O2 Delivery Intra Treatment room air  -DB     O2 Delivery Post Treatment room air  -DB     Pre Patient Position Supine  -DB     Intra Patient Position Sitting  -DB     Post Patient Position Supine  -DB       Row Name 04/01/24 1523          Positioning and Restraints    Pre-Treatment Position in bed  -DB     Post Treatment Position bed  -DB     In Bed notified nsg;call light within reach;encouraged to call for assist;with nsg;fowlers  -DB               User Key  (r) = Recorded By, (t) = Taken By, (c) = Cosigned By      Initials Name Provider Type    Felipa Chang PT Physical Therapist                   Outcome Measures       Row Name 04/01/24 1531          How much help from another person do you currently need...    Turning from your back to your side while in flat bed without using bedrails? 2  -DB     Moving from lying on back to sitting on the side of a flat bed without bedrails? 2  -DB     Moving to and from a bed to a chair (including a wheelchair)? 2  -DB     Standing up from a chair using your arms (e.g., wheelchair, bedside chair)? 2  -DB     Climbing 3-5 steps with a railing? 1  -DB     To walk in hospital  room? 1  -DB     AM-PAC 6 Clicks Score (PT) 10  -DB     Highest Level of Mobility Goal 4 --> Transfer to chair/commode  -DB       Row Name 04/01/24 1531          Functional Assessment    Outcome Measure Options AM-PAC 6 Clicks Basic Mobility (PT)  -DB               User Key  (r) = Recorded By, (t) = Taken By, (c) = Cosigned By      Initials Name Provider Type    DB Felipa Weathers, PT Physical Therapist                                 Physical Therapy Education       Title: PT OT SLP Therapies (In Progress)       Topic: Physical Therapy (Done)       Point: Mobility training (Done)       Learning Progress Summary             Patient Acceptance, E, NR,VU by DB at 4/1/2024 1531    Acceptance, E, NR by  at 3/29/2024 1129    Acceptance, E,D, NR by MS at 3/27/2024 1540                         Point: Home exercise program (Done)       Learning Progress Summary             Patient Acceptance, E, NR,VU by DB at 4/1/2024 1531    Acceptance, E, NR by  at 3/29/2024 1129    Acceptance, E,D, NR by MS at 3/27/2024 1540                         Point: Body mechanics (Done)       Learning Progress Summary             Patient Acceptance, E, NR,VU by DB at 4/1/2024 1531    Acceptance, E, NR by  at 3/29/2024 1129    Acceptance, E,D, NR by MS at 3/27/2024 1540                         Point: Precautions (Done)       Learning Progress Summary             Patient Acceptance, E, NR,VU by DB at 4/1/2024 1531    Acceptance, E, NR by  at 3/29/2024 1129    Acceptance, E,D, NR by MS at 3/27/2024 1540                                         User Key       Initials Effective Dates Name Provider Type Discipline    MS 06/16/21 -  Landon Townsend PT Physical Therapist PT     06/16/21 -  Felipa Weathers, GERI Physical Therapist PT     05/02/22 -  Sussy Walker PT Physical Therapist PT                  PT Recommendation and Plan     Plan of Care Reviewed With: patient  Progress: no change  Outcome Evaluation: Pt supine in bed at  start of session and agreeable to work with PT. Pt having difficulty communicating, garbled speech throughout session. Pt req's maxA x2 for bed mobility, immediatley upon sitting up, pt grabs throat and demo's universal sign for choking. Pt returned to bed, shaw's position, vitals stable throughout, and RN notified to assess pt. RN enters room, pt no longer indicating choking, and SpO2 and HR WNL. Pt was repositioned, will continue to progress with PT as able.     Time Calculation:         PT Charges       Row Name 04/01/24 1531             Time Calculation    Start Time 1340  -DB      Stop Time 1358  -DB      Time Calculation (min) 18 min  -DB      PT Received On 04/01/24  -DB      PT - Next Appointment 04/02/24  -DB         Time Calculation- PT    Total Timed Code Minutes- PT 18 minute(s)  -DB                User Key  (r) = Recorded By, (t) = Taken By, (c) = Cosigned By      Initials Name Provider Type    DB Felipa Weathers, PT Physical Therapist                  Therapy Charges for Today       Code Description Service Date Service Provider Modifiers Qty    [id number]  PT THERAPEUTIC ACT EA 15 MIN 4/1/2024 Felipa Weathers PT GP 1            PT G-Codes  Outcome Measure Options: AM-PAC 6 Clicks Basic Mobility (PT)  AM-PAC 6 Clicks Score (PT): 10  AM-PAC 6 Clicks Score (OT): 8       Felipa Weathers PT  4/1/2024

## 2024-04-01 NOTE — PROGRESS NOTES
"Nutrition Services    Patient Name:  Anthony Gallegos  YOB: 1945  MRN: [id number]  Admit Date:  3/25/2024    Assessment Date:  04/01/24    Comments: TF follow up    Pt in bed sitting up today. Tolerating TF well. Verified NS renal infusing at 50mL/hr goal rate. 250mL q 6 for water flushes. Plan to increase water flushes to 250mL q 4 and replace K per nephrology. Declined palliative care. Possible dc to SNF today.     Labs: Na 149, K 3.4, Glu 102, BUN 33, P 2.2  Meds: Miralax, pericolace,  KCl,   Last BM: 3/31    Recommendations:  Continue NS renal at 50mL/hr goal rate. Water flushes per nephrology to manage 2/2 hypernatremia    RD to follow tolerance, labs and clinical course.     CLINICAL NUTRITION ASSESSMENT      Reason for Assessment Nurse or Nurse Practitioner Consult, Physician Consult, TF Assessment      Diagnosis/Problem   UTI   Medical/Surgical History Past Medical History:   Diagnosis Date    Abscess of scrotum     MARTITA (acute kidney injury)     Altered mental state     Arthritis     AS (ankylosing spondylitis)     History of MRSA infection     Hypertension     Leukocytosis     Tetrahydrocannabinol (THC) use disorder, mild, abuse 12/20/2023    Uveitis        Past Surgical History:   Procedure Laterality Date    COLONOSCOPY      ENDOSCOPY W/ PEG TUBE PLACEMENT N/A 3/29/2024    Procedure: ESOPHAGOGASTRODUODENOSCOPY WITH PERCUTANEOUS ENDOSCOPIC GASTROSTOMY TUBE INSERTION;  Surgeon: Khadar Broderick MD;  Location: [facility] ENDOSCOPY;  Service: General;  Laterality: N/A;  PRE/POST - DYSPHAGIA    ROTATOR CUFF REPAIR Right     TOTAL HIP ARTHROPLASTY Left 2014        Anthropometrics        Current Height  Current Weight  BMI kg/m2 Height: 190.5 cm (75\")  Weight: 80.9 kg (178 lb 5.6 oz) (04/01/24 0420)  Body mass index is 22.29 kg/m².   Adjusted BMI (if applicable)    BMI Category Normal/Healthy (18.4 - 24.9)   Ideal Body Weight (IBW) 196 lb (89.1 kg)   Usual Body Weight (UBW) 152-188 lb   Weight " Trend Loss, Amount/Timeframe: 36 lb (19.1%) x 2 months   Weight History Wt Readings from Last 30 Encounters:   04/01/24 0420 80.9 kg (178 lb 5.6 oz)   03/31/24 0530 82.7 kg (182 lb 6.4 oz)   03/30/24 0557 82.2 kg (181 lb 3.5 oz)   03/29/24 0548 82.6 kg (182 lb 1.6 oz)   03/28/24 0500 76.9 kg (169 lb 8 oz)   03/27/24 0617 76.4 kg (168 lb 6.4 oz)   03/25/24 2123 69.1 kg (152 lb 6.4 oz)   03/25/24 1516 79 kg (174 lb 2.6 oz)   01/31/24 0639 79 kg (174 lb 3.2 oz)   01/29/24 0509 78.8 kg (173 lb 11.6 oz)   01/28/24 0530 77.7 kg (171 lb 4.8 oz)   01/25/24 0537 76.4 kg (168 lb 6.9 oz)   01/23/24 0542 73.2 kg (161 lb 6 oz)   01/22/24 0502 75.1 kg (165 lb 9.1 oz)   01/21/24 2304 75.5 kg (166 lb 7.2 oz)   01/21/24 1744 81.6 kg (180 lb)   01/08/24 0253 81.6 kg (179 lb 14.3 oz)   01/05/24 0440 87.9 kg (193 lb 12.6 oz)   01/04/24 0439 85.6 kg (188 lb 11.4 oz)   01/03/24 0513 82.5 kg (181 lb 14.1 oz)   12/31/23 1300 80.5 kg (177 lb 7.5 oz)   12/31/23 0350 83.8 kg (184 lb 11.9 oz)   12/30/23 0511 85.3 kg (188 lb 0.8 oz)   12/29/23 0341 85.7 kg (188 lb 15 oz)   12/28/23 0435 85.4 kg (188 lb 4.4 oz)   12/27/23 0755 81 kg (178 lb 9.2 oz)   12/27/23 0423 85.4 kg (188 lb 4.4 oz)   12/25/23 0707 85.2 kg (187 lb 13.3 oz)   12/23/23 0608 81.3 kg (179 lb 3.7 oz)   12/22/23 0430 81.8 kg (180 lb 5.4 oz)   12/20/23 1005 81.9 kg (180 lb 8 oz)   03/27/18 1439 100 kg (221 lb)   03/22/18 1613 101 kg (222 lb 12.8 oz)   01/23/18 1414 101 kg (223 lb)   12/26/17 1111 99.8 kg (220 lb)   10/19/17 1239 95.3 kg (210 lb)   08/30/17 1459 91.3 kg (201 lb 3.2 oz)   08/29/17 1221 93 kg (205 lb)   08/10/17 1517 88 kg (194 lb)   07/14/17 1041 85.9 kg (189 lb 6.4 oz)   06/29/17 1318 84.5 kg (186 lb 3.2 oz)   06/21/17 1922 90.7 kg (200 lb)      --  Estimated/Assessed Needs        Current Weight  Weight: 80.9 kg (178 lb 5.6 oz) (04/01/24 0420)       Energy Requirements    Weight for Calculation 152 lb (69.1 kg)   Method for Estimation  30-35 kcal/kg   EST Needs  (kcal/day) 9399-0510       Protein Requirements    Weight for Calculation 152 lb (69.1 kg)   EST Protein Needs (g/kg) 1.2 - 1.5 gm/kg   EST Daily Needs (g/day)        Fluid Requirements     Method for Estimation 1 mL/kcal    EST Needs (mL/day)      Labs       Pertinent Labs    Results from last 7 days   Lab Units 04/01/24  0355 03/31/24  0349 03/30/24  2119 03/30/24  1755 03/30/24  1013 03/30/24  0305 03/29/24  0345 03/26/24  0406 03/25/24  1723   SODIUM mmol/L 149* 149*  --   --  148*   < > 151*   < > 140   POTASSIUM mmol/L 3.4* 3.8 4.4   < > 3.2*   < > 3.4*   < > 4.9   CHLORIDE mmol/L 115* 116*  --   --  113*   < > 118*   < > 101   CO2 mmol/L 26.1 24.2  --   --  25.0   < > 23.1   < > 24.0   BUN mg/dL 33* 27*  --   --  28*   < > 50*   < > 82*   CREATININE mg/dL 0.97 0.99  --   --  0.98   < > 1.69*   < > 2.50*   CALCIUM mg/dL 8.1* 8.2*  --   --  8.3*   < > 8.5*   < > 9.7   BILIRUBIN mg/dL  --   --   --   --   --   --  0.7  --  0.6   ALK PHOS U/L  --   --   --   --   --   --  84  --  112   ALT (SGPT) U/L  --   --   --   --   --   --  10  --  15   AST (SGOT) U/L  --   --   --   --   --   --  25  --  16   GLUCOSE mg/dL 102* 96  --   --  100*   < > 126*   < > 125*    < > = values in this interval not displayed.     Results from last 7 days   Lab Units 04/01/24  0355 03/31/24  0349 03/30/24  0305   MAGNESIUM mg/dL 1.9 2.1 2.2   PHOSPHORUS mg/dL 2.2* 2.6 2.0*   HEMOGLOBIN g/dL 10.3* 11.1* 11.1*   HEMATOCRIT % 31.8* 35.7* 34.6*   WBC 10*3/mm3 7.75 10.93* 10.45   ALBUMIN g/dL 2.4* 2.3* 2.2*     Results from last 7 days   Lab Units 04/01/24  0355 03/31/24  0349 03/30/24  0305 03/29/24  0345 03/28/24  0757   PLATELETS 10*3/mm3 383 422 467* 481* 478*     COVID19   Date Value Ref Range Status   03/25/2024 Not Detected Not Detected - Ref. Range Final     Lab Results   Component Value Date    HGBA1C 5.80 (H) 01/23/2024          Medications           Scheduled Medications bisacodyl, 10 mg, Rectal, Once  enoxaparin, 40 mg,  Subcutaneous, Q24H  ertapenem, 1,000 mg, Intravenous, Q24H  finasteride, 5 mg, Oral, Daily  lansoprazole, 30 mg, Per G Tube, Q AM  Menthol-Zinc Oxide, 1 Application, Topical, Q12H  polyethylene glycol, 17 g, Per G Tube, Daily  potassium chloride, 40 mEq, Oral, Q4H  senna-docusate sodium, 2 tablet, Per G Tube, Daily  terazosin, 1 mg, Per G Tube, Nightly       Infusions       PRN Medications   acetaminophen    senna-docusate sodium **AND** [DISCONTINUED] polyethylene glycol **AND** bisacodyl **AND** bisacodyl    dextrose    dextrose    glucagon (human recombinant)    melatonin    ondansetron ODT **OR** ondansetron    Potassium Replacement - Follow Nurse / BPA Driven Protocol    [COMPLETED] Insert Peripheral IV **AND** sodium chloride     Physical Findings          General Findings confused, disoriented, loss of muscle mass, loss of subcutaneous fat, room air   Oral/Mouth Cavity WDL   Edema  no edema   Gastrointestinal hypoactive bowel sounds, non-distended    Skin  bruising, pressure injury: st II coccyx, other: abrasion   Tubes/Drains/Lines none   NFPE See Malnutrition Severity Assessment, Date Completed: 3/26   --  Malnutrition Severity Assessment      Patient meets criteria for : Severe Malnutrition         Current Nutrition Orders & Evaluation of Intake       Oral Nutrition     Food Allergies NKFA   Current PO Diet NPO Diet NPO Type: Strict NPO   Supplement n/a   PO Evaluation     % PO Intake N/a    Factors Affecting Intake: swallow impairment      Enteral Nutrition     Enteral Route PEG    TF Delivery Method Continuous    Propofol Rate/Kcal     Current TF Order/Rate  Novasource Renal @ 50 mL/hr    TF Goal Rate 50 mL/hr    Current Water Flush 250 mL Q 6 hr    Modular None    TF Residual  no or minimal residual    TF Tolerance tolerating    TF Observation Verified correct TF and water flush infusing per orders     PES STATEMENT / NUTRITION DIAGNOSIS      Nutrition Dx Problem  Problem: Malnutrition  (severe)  Etiology: Factors Affecting Nutrition - poor PO, weakness, decreased mobility    Signs/Symptoms: NPO, Report of Minimal PO Intake, NFPE Results, Unintended Weight Change, and Report/Observation     NUTRITION INTERVENTION / PLAN OF CARE      Intervention Goal(s) Maintain nutrition status, Reduce/improve symptoms, Meet estimated needs, Disease management/therapy, Initiate feeding/diet, and Appropriate weight gain         RD Intervention/Action Await initiation/advancement of PO diet, Await initiation of EN/PN, Continue to monitor, and Care plan reviewed     Nutrition Prescription        Enteral Prescription:     Enteral Route PEG    TF Delivery Method Continuous    Enteral Product Novasource Renal    Modular None    Propofol Rate/Kcal     TF Start Rate/Volume  20 mL    TF Goal Rate/Volume 50 mL     Free Water Flush Per Nephrology (Na 151)    TF Provision at Goal:          Calories 2200 kcal, meets 100 % needs         Protein  100 gm protein, meets 100 % needs         Fluid (mL) Per Nephrology (Na 151)    Prescription Ordered No, recommended     --      Monitor/Evaluation Per protocol   Discharge Plan/Needs Pending clinical course   --    RD to follow per protocol.      Electronically signed by:  Carolyn Olmos  04/01/24 11:36 EDT

## 2024-04-01 NOTE — PROGRESS NOTES
Name: Anthony Gallegos ADMIT: 3/25/2024   : 1945  PCP: Provider, No Known    MRN: [id number] LOS: 7 days   AGE/SEX: 79 y.o. male  ROOM: [facility]     Subjective   Subjective     No events overnight. Doesn't respond to questions, though he is awake and moving his upper extremity spontaneously       Objective   Objective   Vital Signs  Temp:  [97.9 °F (36.6 °C)-98.1 °F (36.7 °C)] 98.1 °F (36.7 °C)  Heart Rate:  [78-84] 78  Resp:  [18] 18  BP: (101-109)/(58-67) 101/58  SpO2:  [98 %-100 %] 100 %  on   ;   Device (Oxygen Therapy): room air  Body mass index is 22.29 kg/m².  Physical Exam  Constitutional:       General: He is not in acute distress.     Appearance: He is ill-appearing. He is not toxic-appearing.   Cardiovascular:      Rate and Rhythm: Normal rate and regular rhythm.      Heart sounds: Normal heart sounds.   Pulmonary:      Effort: Pulmonary effort is normal.      Breath sounds: Normal breath sounds.   Abdominal:      General: Bowel sounds are normal.      Palpations: Abdomen is soft.      Comments: PEG, abdominal binder   Musculoskeletal:         General: No tenderness.      Right lower leg: No edema.      Left lower leg: No edema.   Neurological:      Mental Status: He is alert.         Results Review     I reviewed the patient's new clinical results.  Results from last 7 days   Lab Units 24  03524  03424  0305 24  0345   WBC 10*3/mm3 7.75 10.93* 10.45 11.18*   HEMOGLOBIN g/dL 10.3* 11.1* 11.1* 11.4*   PLATELETS 10*3/mm3 383 422 467* 481*     Results from last 7 days   Lab Units 24  0355 24  0349 24  2119 24  1755 24  1013 24  0305   SODIUM mmol/L 149* 149*  --   --  148* 149*   POTASSIUM mmol/L 3.4* 3.8 4.4 3.6 3.2* 3.3*   CHLORIDE mmol/L 115* 116*  --   --  113* 118*   CO2 mmol/L 26.1 24.2  --   --  25.0 21.9*   BUN mg/dL 33* 27*  --   --  28* <2*   CREATININE mg/dL 0.97 0.99  --   --  0.98 0.93   GLUCOSE mg/dL 102* 96  --   --  100*  118*   Estimated Creatinine Clearance: 69.5 mL/min (by C-G formula based on SCr of 0.97 mg/dL).  Results from last 7 days   Lab Units 04/01/24  0355 03/31/24  0349 03/30/24  0305 03/29/24  0345 03/28/24  0757 03/25/24  1723   ALBUMIN g/dL 2.4* 2.3* 2.2* 2.5*   < > 3.0*   BILIRUBIN mg/dL  --   --   --  0.7  --  0.6   ALK PHOS U/L  --   --   --  84  --  112   AST (SGOT) U/L  --   --   --  25  --  16   ALT (SGPT) U/L  --   --   --  10  --  15    < > = values in this interval not displayed.     Results from last 7 days   Lab Units 04/01/24 0355 03/31/24 0349 03/30/24  1013 03/30/24  0305 03/29/24  0345   CALCIUM mg/dL 8.1* 8.2* 8.3* 8.3* 8.5*   ALBUMIN g/dL 2.4* 2.3*  --  2.2* 2.5*   MAGNESIUM mg/dL 1.9 2.1  --  2.2 2.7*   PHOSPHORUS mg/dL 2.2* 2.6  --  2.0* 2.9     Results from last 7 days   Lab Units 03/25/24  2303 03/25/24  1956 03/25/24  1723   PROCALCITONIN ng/mL  --   --  3.36*   LACTATE mmol/L 1.8 2.1* 2.4*     COVID19   Date Value Ref Range Status   03/25/2024 Not Detected Not Detected - Ref. Range Final     Glucose   Date/Time Value Ref Range Status   04/01/2024 1154 103 70 - 130 mg/dL Final   04/01/2024 0635 108 70 - 130 mg/dL Final   04/01/2024 0036 112 70 - 130 mg/dL Final   03/31/2024 1639 122 70 - 130 mg/dL Final   03/30/2024 1716 88 70 - 130 mg/dL Final   03/30/2024 0755 97 70 - 130 mg/dL Final   03/30/2024 0621 105 70 - 130 mg/dL Final       MRI Brain Without Contrast  Narrative: BRAIN MRI WITHOUT CONTRAST     HISTORY: Mental status change, unknown cause; R63.8-Other symptoms and  signs concerning food and fluid intake; N17.9-Acute kidney failure,  unspecified; R52-Pain, unspecified; R13.10-Dysphagia, unspecified;  E86.0-Dehydration; R79.89-Other specified abnormal findings of blood  chemistry; R62.7-Adult failure to thrive; R53.1-Weakness; R63.4-Abnormal  weight loss; N39.0-Urinary tract infection, site not specifi     COMPARISON: March 25, 2024.     FINDINGS:  Multiplanar images of the head were  obtained without  gadolinium. No areas of restricted diffusion are seen to suggest acute  infarct. There is atrophy. There is extensive periventricular and deep  white matter microangiopathic change. There is no midline shift or mass  effect. Intracranial flow voids appear intact. Old lacunar infarcts are  noted within the left basal ganglia. No abnormality is seen on gradient  echo imaging. There may be some mucosal thickening within the ethmoid  sinuses.     Impression: 1. No acute intracranial abnormality.     This report was finalized on 3/29/2024 9:26 PM by Dr. Kirsten Peña M.D on Workstation: [id number]       Scheduled Medications  bisacodyl, 10 mg, Rectal, Once  enoxaparin, 40 mg, Subcutaneous, Q24H  ertapenem, 1,000 mg, Intravenous, Q24H  finasteride, 5 mg, Oral, Daily  lansoprazole, 30 mg, Per G Tube, Q AM  Menthol-Zinc Oxide, 1 Application, Topical, Q12H  polyethylene glycol, 17 g, Per G Tube, Daily  potassium chloride, 40 mEq, Oral, Q4H  senna-docusate sodium, 2 tablet, Per G Tube, Daily  terazosin, 1 mg, Per G Tube, Nightly    Infusions   Diet  NPO Diet NPO Type: Strict NPO       Assessment/Plan     Active Hospital Problems    Diagnosis  POA    **UTI (urinary tract infection) [N39.0]  Yes    Encephalopathy [G93.40]  Unknown    Decreased oral intake [R63.8]  Unknown    Dysphagia [R13.10]  Unknown    MARTITA (acute kidney injury) [N17.9]  Yes    Severe malnutrition [E43]  Yes    Hypertension [I10]  Yes      Resolved Hospital Problems   No resolved problems to display.       79 y.o. male admitted with UTI (urinary tract infection).    Patient is a 78 y.o. male with a history of HTN, BPH pyelonephritis/UTI, who presented to [facility] from facility for failure to thrive, not eating or drinking much, weight loss, decreased mobility.     ESBL UTI  -Initially treated with IV Zosyn, urine culture came back positive for ESBL, ID consulted, patient transition to IV ertapenem 1 g daily through  04/1 per ID recommendations. (Today is last day of antibiotics)    Gross hematuria/acute urinary retention  -felt to be secondary to UTI  -hematuria is resolved  -finasteride, a1 blocker  -will attempt voiding trial prior to discharge per urology recommendations     MARTITA/bilateral hydronephrosis  -Creatinine 2.5 on admission, up from 0.66 on 01/29/2024  -CT scan showed mild-to-moderate bilateral hydronephrosis   -Status post Mariscal catheter placement 03/26/2024 per urology with resolution of MARTITA    Hypernatremia  -free water flushes increased   -continue to montior     Hypokalemia/hypophosphatemia  -replace via protocol     Encephalopathy  -Likely secondary to UTI/dehydration/malnutrition  -No history of dementia per daughter, at baseline oriented x 3  -CT head with no acute findings  -Follow-up MRI brain 03/29/2024 showed old stroke but no acute findings  -Improving, alert, partially oriented     Failure to thrive/malnutrition/decreased p.o. intake  -speech therapy evaluated, underwent VFSS 03/28/2024, found to have diffuse to be profound generalized pharyngeal weakness, silent aspiration.  Strict n.p.o. recommended  -Palliative care evaluated for goals of care discussion, family wanted to proceed with PEG tube placement.  -Status post PEG tube placement 03/29  -Continue tube feeds, tolerating     Constipation  -CT scan showed moderate gaseous distention of the colon with moderate amount of stool in the right colon and rectum.  -Patient refused suppository previously  -Status post Fleet enema 03/29, small BM afterwards  -Had multiple bowel movements overnight 03/30-03/31     Hypertension  -well controlled     Anemia of chronic disease  -Hemoglobin 10.3    GERD  -ppi    Lovenox 40 mg SC daily for DVT prophylaxis.  Full code.  Discussed with patient and CCP.  Anticipate discharge to SNU facility once arrangements have been made.      Gerson Townsend MD  [facility]  04/01/24  14:46 EDT    I wore  protective equipment throughout this patient encounter including a face mask, gloves and protective eyewear.  Hand hygiene was performed before donning protective equipment and after removal when leaving the room.

## 2024-04-01 NOTE — CASE MANAGEMENT/SOCIAL WORK
Continued Stay Note  [facility]     Patient Name: Anthony Gallegos  MRN: [id number]  Today's Date: 4/1/2024    Admit Date: 3/25/2024    Plan: SNF vs returning to [facility] with home health   Discharge Plan       Row Name 04/01/24 1100       Plan    Plan Comments Per TUYET DUONG, APS report (ID# [id number]) DOES meet acceptance criteria for further assessment and has been assigned to a /staff.                   Discharge Codes    No documentation.                 Expected Discharge Date and Time       Expected Discharge Date Expected Discharge Time    Apr 1, 2024               HODA Sargent

## 2024-04-01 NOTE — PLAN OF CARE
Goal Outcome Evaluation:              Outcome Evaluation: VSS. RESTED WELL IN BETWEEN CARE. UNEVENTFUL NIGHT.

## 2024-04-01 NOTE — PROGRESS NOTES
[facility] Progress Note      Patient Name: Anthony Gallegos  : 1945  MRN: [id number]  Primary Care Physician:  Provider, No Known  Date of admission: 3/25/2024    Subjective     Interval History:   F/u MARTITA hyperNa    Review of Systems:   No acute issues   Given D5W 1L yesterday  Free water 250 q4h    Objective     Vitals:   Temp:  [97.5 °F (36.4 °C)-98.1 °F (36.7 °C)] 98.1 °F (36.7 °C)  Heart Rate:  [78-88] 78  Resp:  [18] 18  BP: (101-125)/(58-67) 101/58    Intake/Output Summary (Last 24 hours) at 2024 1001  Last data filed at 2024 0606  Gross per 24 hour   Intake 2989 ml   Output 1100 ml   Net 1889 ml       Physical Exam:    General Appearance: frail cachectic & confused  Neck: supple, no JVD  Lungs: CTA bilat no rales  Heart: RRR, normal S1 and S2  Abdomen: soft, nontender, +PEG  : forde  Extremities: no edema, cyanosis or clubbing    Scheduled Meds:     bisacodyl, 10 mg, Rectal, Once  enoxaparin, 40 mg, Subcutaneous, Q24H  ertapenem, 1,000 mg, Intravenous, Q24H  finasteride, 5 mg, Oral, Daily  lansoprazole, 30 mg, Per G Tube, Q AM  Menthol-Zinc Oxide, 1 Application, Topical, Q12H  polyethylene glycol, 17 g, Per G Tube, Daily  potassium chloride, 40 mEq, Oral, Q4H  senna-docusate sodium, 2 tablet, Per G Tube, Daily  tamsulosin, 0.4 mg, Oral, Nightly      IV Meds:        Results Reviewed:   I have personally reviewed the results from the time of this admission to 2024 10:01 EDT     Results from last 7 days   Lab Units 24  0355 24  0349 24  2119 24  1755 24  1013 24  0305 24  0345 24  0406 24  1723   SODIUM mmol/L 149* 149*  --   --  148*   < > 151*   < > 140   POTASSIUM mmol/L 3.4* 3.8 4.4   < > 3.2*   < > 3.4*   < > 4.9   CHLORIDE mmol/L 115* 116*  --   --  113*   < > 118*   < > 101   CO2 mmol/L 26.1 24.2  --   --  25.0   < > 23.1   < > 24.0   BUN mg/dL 33* 27*  --   --  28*   < > 50*   < > 82*   CREATININE  mg/dL 0.97 0.99  --   --  0.98   < > 1.69*   < > 2.50*   CALCIUM mg/dL 8.1* 8.2*  --   --  8.3*   < > 8.5*   < > 9.7   BILIRUBIN mg/dL  --   --   --   --   --   --  0.7  --  0.6   ALK PHOS U/L  --   --   --   --   --   --  84  --  112   ALT (SGPT) U/L  --   --   --   --   --   --  10  --  15   AST (SGOT) U/L  --   --   --   --   --   --  25  --  16   GLUCOSE mg/dL 102* 96  --   --  100*   < > 126*   < > 125*    < > = values in this interval not displayed.     Estimated Creatinine Clearance: 69.5 mL/min (by C-G formula based on SCr of 0.97 mg/dL).  Results from last 7 days   Lab Units 04/01/24  0355 03/31/24  0349 03/30/24  0305   MAGNESIUM mg/dL 1.9 2.1 2.2   PHOSPHORUS mg/dL 2.2* 2.6 2.0*         Results from last 7 days   Lab Units 04/01/24  0355 03/31/24 0349 03/30/24  0305 03/29/24  0345 03/28/24  0757   WBC 10*3/mm3 7.75 10.93* 10.45 11.18* 10.45   HEMOGLOBIN g/dL 10.3* 11.1* 11.1* 11.4* 10.6*   PLATELETS 10*3/mm3 383 422 467* 481* 478*           Assessment / Plan     ASSESSMENT:  Acute kidney injury on CKD 2 with very poor muscle mass, acute component resolved, Cr ~ 1 (his approx BL).  MARTITA likely due to obstruction, volume depletion.  Nephrotic-range proteinuria by ratio in January  Hypernatremia due to free water deficit - Na same 149 despite D5W and inc water flushes.  No e/o DI  E. coli urinary tract infection on Zosyn  BPH; Mariscal catheter in place  Failure to thrive; PEG placed 3/29  Dysphagia with recent aspiration  Anemia  Hypokalemia (3.4)    PLAN:  Inc water flushes 250 q4h  Hold off additional D5W  Replace K  Prognosis very poor but palliative care declined   No objection to discharge to SNF      Shukri Hassan MD  04/01/24  10:01 EDT    [facility]  [phone]

## 2024-04-02 LAB
ALBUMIN SERPL-MCNC: 2.3 G/DL (ref 3.5–5.2)
ANION GAP SERPL CALCULATED.3IONS-SCNC: 7.7 MMOL/L (ref 5–15)
BASOPHILS # BLD AUTO: 0.05 10*3/MM3 (ref 0–0.2)
BASOPHILS NFR BLD AUTO: 0.7 % (ref 0–1.5)
BUN SERPL-MCNC: 33 MG/DL (ref 8–23)
BUN/CREAT SERPL: 33.3 (ref 7–25)
CALCIUM SPEC-SCNC: 8.5 MG/DL (ref 8.6–10.5)
CHLORIDE SERPL-SCNC: 113 MMOL/L (ref 98–107)
CO2 SERPL-SCNC: 25.3 MMOL/L (ref 22–29)
CREAT SERPL-MCNC: 0.99 MG/DL (ref 0.76–1.27)
DEPRECATED RDW RBC AUTO: 41.9 FL (ref 37–54)
EGFRCR SERPLBLD CKD-EPI 2021: 77 ML/MIN/1.73
EOSINOPHIL # BLD AUTO: 0.32 10*3/MM3 (ref 0–0.4)
EOSINOPHIL NFR BLD AUTO: 4.5 % (ref 0.3–6.2)
ERYTHROCYTE [DISTWIDTH] IN BLOOD BY AUTOMATED COUNT: 13.1 % (ref 12.3–15.4)
GLUCOSE BLDC GLUCOMTR-MCNC: 106 MG/DL (ref 70–130)
GLUCOSE BLDC GLUCOMTR-MCNC: 123 MG/DL (ref 70–130)
GLUCOSE BLDC GLUCOMTR-MCNC: 94 MG/DL (ref 70–130)
GLUCOSE SERPL-MCNC: 124 MG/DL (ref 65–99)
HCT VFR BLD AUTO: 32 % (ref 37.5–51)
HGB BLD-MCNC: 10.1 G/DL (ref 13–17.7)
IMM GRANULOCYTES # BLD AUTO: 0.02 10*3/MM3 (ref 0–0.05)
IMM GRANULOCYTES NFR BLD AUTO: 0.3 % (ref 0–0.5)
LYMPHOCYTES # BLD AUTO: 1.58 10*3/MM3 (ref 0.7–3.1)
LYMPHOCYTES NFR BLD AUTO: 22.4 % (ref 19.6–45.3)
MAGNESIUM SERPL-MCNC: 1.9 MG/DL (ref 1.6–2.4)
MCH RBC QN AUTO: 27.5 PG (ref 26.6–33)
MCHC RBC AUTO-ENTMCNC: 31.6 G/DL (ref 31.5–35.7)
MCV RBC AUTO: 87.2 FL (ref 79–97)
MONOCYTES # BLD AUTO: 0.45 10*3/MM3 (ref 0.1–0.9)
MONOCYTES NFR BLD AUTO: 6.4 % (ref 5–12)
NEUTROPHILS NFR BLD AUTO: 4.63 10*3/MM3 (ref 1.7–7)
NEUTROPHILS NFR BLD AUTO: 65.7 % (ref 42.7–76)
NRBC BLD AUTO-RTO: 0 /100 WBC (ref 0–0.2)
PHOSPHATE SERPL-MCNC: 2.3 MG/DL (ref 2.5–4.5)
PLATELET # BLD AUTO: 327 10*3/MM3 (ref 140–450)
PMV BLD AUTO: 9.4 FL (ref 6–12)
POTASSIUM SERPL-SCNC: 3.4 MMOL/L (ref 3.5–5.2)
POTASSIUM SERPL-SCNC: 4.3 MMOL/L (ref 3.5–5.2)
RBC # BLD AUTO: 3.67 10*6/MM3 (ref 4.14–5.8)
S PYO AG THROAT QL: NEGATIVE
SODIUM SERPL-SCNC: 146 MMOL/L (ref 136–145)
WBC NRBC COR # BLD AUTO: 7.05 10*3/MM3 (ref 3.4–10.8)

## 2024-04-02 PROCEDURE — 85025 COMPLETE CBC W/AUTO DIFF WBC: CPT | Performed by: SURGERY

## 2024-04-02 PROCEDURE — 92526 ORAL FUNCTION THERAPY: CPT

## 2024-04-02 PROCEDURE — 25810000003 SODIUM CHLORIDE 0.9 % SOLUTION: Performed by: INTERNAL MEDICINE

## 2024-04-02 PROCEDURE — 83735 ASSAY OF MAGNESIUM: CPT | Performed by: SURGERY

## 2024-04-02 PROCEDURE — 25010000002 ENOXAPARIN PER 10 MG: Performed by: STUDENT IN AN ORGANIZED HEALTH CARE EDUCATION/TRAINING PROGRAM

## 2024-04-02 PROCEDURE — 80069 RENAL FUNCTION PANEL: CPT | Performed by: INTERNAL MEDICINE

## 2024-04-02 PROCEDURE — 84132 ASSAY OF SERUM POTASSIUM: CPT | Performed by: STUDENT IN AN ORGANIZED HEALTH CARE EDUCATION/TRAINING PROGRAM

## 2024-04-02 PROCEDURE — 82948 REAGENT STRIP/BLOOD GLUCOSE: CPT

## 2024-04-02 PROCEDURE — 97530 THERAPEUTIC ACTIVITIES: CPT

## 2024-04-02 PROCEDURE — 87880 STREP A ASSAY W/OPTIC: CPT | Performed by: STUDENT IN AN ORGANIZED HEALTH CARE EDUCATION/TRAINING PROGRAM

## 2024-04-02 PROCEDURE — 87081 CULTURE SCREEN ONLY: CPT | Performed by: INTERNAL MEDICINE

## 2024-04-02 RX ORDER — FENTANYL/ROPIVACAINE/NS/PF 2-625MCG/1
15 PLASTIC BAG, INJECTION (ML) EPIDURAL ONCE
Status: COMPLETED | OUTPATIENT
Start: 2024-04-02 | End: 2024-04-02

## 2024-04-02 RX ORDER — POTASSIUM CHLORIDE 1.5 G/1.58G
40 POWDER, FOR SOLUTION ORAL EVERY 4 HOURS
Status: COMPLETED | OUTPATIENT
Start: 2024-04-02 | End: 2024-04-02

## 2024-04-02 RX ADMIN — POTASSIUM PHOSPHATE, MONOBASIC POTASSIUM PHOSPHATE, DIBASIC 15 MMOL: 224; 236 INJECTION, SOLUTION, CONCENTRATE INTRAVENOUS at 09:41

## 2024-04-02 RX ADMIN — LANSOPRAZOLE 30 MG: 15 TABLET, ORALLY DISINTEGRATING ORAL at 05:43

## 2024-04-02 RX ADMIN — POTASSIUM CHLORIDE 40 MEQ: 1.5 POWDER, FOR SOLUTION ORAL at 09:41

## 2024-04-02 RX ADMIN — ENOXAPARIN SODIUM 40 MG: 100 INJECTION SUBCUTANEOUS at 19:18

## 2024-04-02 RX ADMIN — POTASSIUM CHLORIDE 40 MEQ: 1.5 POWDER, FOR SOLUTION ORAL at 05:43

## 2024-04-02 RX ADMIN — POLYETHYLENE GLYCOL 3350 17 G: 17 POWDER, FOR SOLUTION ORAL at 09:41

## 2024-04-02 RX ADMIN — ANORECTAL OINTMENT 1 APPLICATION: 15.7; .44; 24; 20.6 OINTMENT TOPICAL at 09:42

## 2024-04-02 RX ADMIN — TERAZOSIN HYDROCHLORIDE 1 MG: 1 CAPSULE ORAL at 21:47

## 2024-04-02 RX ADMIN — ANORECTAL OINTMENT 1 APPLICATION: 15.7; .44; 24; 20.6 OINTMENT TOPICAL at 21:47

## 2024-04-02 NOTE — PLAN OF CARE
Goal Outcome Evaluation:  Plan of Care Reviewed With: patient        Progress: no change  Outcome Evaluation: Pt remains on TF, tolerated fine. Oral care provided. Q2 turn. Coccyx dressing changed. Bed alarm in place.

## 2024-04-02 NOTE — CONSULTS
ENT care team consulted to evaluate patient for possible pharyngitis. Patient has been treated for ESBL UTI since admission. Today is the last day of antibiotic therapy.    All pertinent information regarding this hospitalization has been reviewed.     A/P: Upon entering room, patient is essentially non-verbal and there was no family present. Per patient's RN, he is non-verbal due to the pain he's experiencing from his throat. A culture was obtained prior to my arrival but has not been resulted. Examination revealed a thick mucus secretion line along patient's palate and tonsillar tissue. No erythema noted. Appearance of secretions is not typical of exudative pharyngitis. Patient was actively gurgling secretions in the back of oropharynx. Oral cavity and tongue are extremely dry. No cervical lymphadenopathy noted.     Plan: Given patient's WBC being WNL, history of dysphagia and current dehydration I believe the secretions are non-infectious. Recommend continued oral care by nursing staff and suctioning as needed.

## 2024-04-02 NOTE — PLAN OF CARE
Goal Outcome Evaluation:  Plan of Care Reviewed With: patient        Progress: improving  Outcome Evaluation: Pt tolerated treatment fair this date. Required max-dep A x2 for bed mobility w/ use of draw sheet. Pt maintained sitting balance on EOB for a few minutes w/ between SBA-mod A, pt demonstrating heavy posterior lean when becoming fatigued. Pt was difficult to understand, though pointing to his R knee when attempting a few seated exercises. Unable to tolerate much activity d/t weakness and fatigue.      Anticipated Discharge Disposition (PT): skilled nursing facility, extended care facility

## 2024-04-02 NOTE — CASE MANAGEMENT/SOCIAL WORK
Continued Stay Note  [facility]     Patient Name: Anthony Gallegos  MRN: [id number]  Today's Date: 4/2/2024    Admit Date: 3/25/2024    Plan: DC to medicaid pending bed, will need ambulance transportation   Discharge Plan       Row Name 04/02/24 1349       Plan    Plan DC to medicaid pending bed, will need ambulance transportation    Plan Comments Discussed with daughter, Whit, that pt will require a medicaid pending bed. List of facilities that will accept current insurance and is medicaid pending. Daughter will consider overnight and discuss with CCP.                   Discharge Codes    No documentation.                 Expected Discharge Date and Time       Expected Discharge Date Expected Discharge Time    Apr 4, 2024               Viry Berg RN

## 2024-04-02 NOTE — THERAPY TREATMENT NOTE
Patient Name: Anthony Gallegos  : 1945    MRN: [id number]                              Today's Date: 2024       Admit Date: 3/25/2024    Visit Dx:     ICD-10-CM ICD-9-CM   1. Decreased oral intake  R63.8 783.9   2. MARTITA (acute kidney injury)  N17.9 584.9   3. Whole body pain  R52 780.96   4. Dysphagia, unspecified type  R13.10 787.20   5. Dehydration  E86.0 276.51   6. Elevated troponin  R79.89 790.6   7. Failure to thrive in adult  R62.7 783.7   8. Generalized weakness  R53.1 780.79   9. Weight loss  R63.4 783.21   10. Urinary tract infection in male  N39.0 599.0     Patient Active Problem List   Diagnosis    Ankylosing spondylitis of cervical region    Recent unexplained weight loss    Abnormal serum lipase level    Abnormal serum level of amylase    Hypertension    Boil    Immobility    Fall from bed    Tetrahydrocannabinol (THC) use disorder, mild, abuse    Generalized pain        Grief    DISH (disseminated idiopathic skeletal hyperostosis)    Generalized weakness    Severe malnutrition    Altered mental status    Transient alteration of awareness    GERD without esophagitis    BPH (benign prostatic hyperplasia)    UTI (urinary tract infection)    Dehydration    MARTITA (acute kidney injury)    Hyperglycemia    Encephalopathy    Decreased oral intake    Dysphagia     Past Medical History:   Diagnosis Date    Abscess of scrotum     MARTITA (acute kidney injury)     Altered mental state     Arthritis     AS (ankylosing spondylitis)     History of MRSA infection     Hypertension     Leukocytosis     Tetrahydrocannabinol (THC) use disorder, mild, abuse 2023    Uveitis      Past Surgical History:   Procedure Laterality Date    COLONOSCOPY      ENDOSCOPY W/ PEG TUBE PLACEMENT N/A 3/29/2024    Procedure: ESOPHAGOGASTRODUODENOSCOPY WITH PERCUTANEOUS ENDOSCOPIC GASTROSTOMY TUBE INSERTION;  Surgeon: Khadar Broderick MD;  Location: [facility] ENDOSCOPY;  Service: General;  Laterality: N/A;  PRE/POST -  DYSPHAGIA    ROTATOR CUFF REPAIR Right     TOTAL HIP ARTHROPLASTY Left 2014      General Information       [facility] Name 04/02/24 1014          Physical Therapy Time and Intention    Document Type therapy note (daily note)  -     Mode of Treatment physical therapy  -[facility] Name 04/02/24 1014          General Information    Existing Precautions/Restrictions fall  -[facility] Name 04/02/24 1014          Cognition    Orientation Status (Cognition) unable/difficult to assess  difficult to understand pt  -               User Key  (r) = Recorded By, (t) = Taken By, (c) = Cosigned By      Initials Name Provider Type     Mireya Portillo PTA Physical Therapist Assistant                   Mobility       [facility] Name 04/02/24 1016          Bed Mobility    Bed Mobility supine-sit;sit-supine  -     Supine-Sit [place] (Bed Mobility) maximum assist (25% patient effort);dependent (less than 25% patient effort);2 person assist  -     Sit-Supine [place] (Bed Mobility) maximum assist (25% patient effort);dependent (less than 25% patient effort);2 person assist  -     Assistive Device (Bed Mobility) bed rails;head of bed elevated;draw sheet  -               User Key  (r) = Recorded By, (t) = Taken By, (c) = Cosigned By      Initials Name Provider Type     Mireya Portillo PTA Physical Therapist Assistant                   Obj/Interventions       [facility] Name 04/02/24 1016          Motor Skills    Therapeutic Exercise --  seated AP and LAQ x5 reps  -[facility] Name 04/02/24 1016          Balance    Comment, Balance sitting balance on EOB for a few minutes w/ between SBA and mod A; pt began to demonstrate heavy posterior lean when becoming fatigued  -               User Key  (r) = Recorded By, (t) = Taken By, (c) = Cosigned By      Initials Name Provider Type    Mireya Kang PTA Physical Therapist Assistant                   Goals/Plan    No documentation.                  Clinical Impression        Row Name 04/02/24 1017          Pain    Additional Documentation Pain Scale: FACES Pre/Post-Treatment (Group)  -       Row Name 04/02/24 1017          Pain Scale: FACES Pre/Post-Treatment    Pain: FACES Scale, Pretreatment 0-->no hurt  -SM     Posttreatment Pain Rating 2-->hurts little bit  -SM     Pain Location - Side/Orientation Right  -     Pain Location - knee  -SM       Row Name 04/02/24 1017          Positioning and Restraints    Pre-Treatment Position in bed  -SM     Post Treatment Position bed  -SM     In Bed supine;call light within reach;encouraged to call for assist  -               User Key  (r) = Recorded By, (t) = Taken By, (c) = Cosigned By      Initials Name Provider Type    Mireya Kang PTA Physical Therapist Assistant                   Outcome Measures       Row Name 04/02/24 1019          How much help from another person do you currently need...    Turning from your back to your side while in flat bed without using bedrails? 2  -SM     Moving from lying on back to sitting on the side of a flat bed without bedrails? 2  -SM     Moving to and from a bed to a chair (including a wheelchair)? 1  -SM     Standing up from a chair using your arms (e.g., wheelchair, bedside chair)? 1  -SM     Climbing 3-5 steps with a railing? 1  -SM     To walk in hospital room? 1  -SM     AM-PAC 6 Clicks Score (PT) 8  -SM     Highest Level of Mobility Goal 3 --> Sit at edge of bed  -       Row Name 04/02/24 1019          Functional Assessment    Outcome Measure Options AM-PAC 6 Clicks Basic Mobility (PT)  -               User Key  (r) = Recorded By, (t) = Taken By, (c) = Cosigned By      Initials Name Provider Type    Mireya Kang PTA Physical Therapist Assistant                                 Physical Therapy Education       Title: PT OT SLP Therapies (In Progress)       Topic: Physical Therapy (In Progress)       Point: Mobility training (In Progress)       Learning Progress  Summary             Patient Acceptance, E,D, NR by SM at 4/2/2024 1019    Acceptance, E, NR,VU by DB at 4/1/2024 1531    Acceptance, E, NR by SM1 at 3/29/2024 1129    Acceptance, E,D, NR by MS at 3/27/2024 1540                         Point: Home exercise program (In Progress)       Learning Progress Summary             Patient Acceptance, E,D, NR by SM at 4/2/2024 1019    Acceptance, E, NR,VU by DB at 4/1/2024 1531    Acceptance, E, NR by SM1 at 3/29/2024 1129    Acceptance, E,D, NR by MS at 3/27/2024 1540                         Point: Body mechanics (In Progress)       Learning Progress Summary             Patient Acceptance, E,D, NR by SM at 4/2/2024 1019    Acceptance, E, NR,VU by DB at 4/1/2024 1531    Acceptance, E, NR by SM1 at 3/29/2024 1129    Acceptance, E,D, NR by MS at 3/27/2024 1540                         Point: Precautions (In Progress)       Learning Progress Summary             Patient Acceptance, E,D, NR by SM at 4/2/2024 1019    Acceptance, E, NR,VU by DB at 4/1/2024 1531    Acceptance, E, NR by 1 at 3/29/2024 1129    Acceptance, E,D, NR by MS at 3/27/2024 1540                                         User Key       Initials Effective Dates Name Provider Type Discipline    MS 06/16/21 -  Landon Townsend, PT Physical Therapist PT     03/07/18 -  Mireya Portillo, PTA Physical Therapist Assistant PT     06/16/21 -  Felipa Weathers, PT Physical Therapist PT    [facility] 05/02/22 -  Sussy Walker, PT Physical Therapist PT                  PT Recommendation and Plan     Plan of Care Reviewed With: patient  Progress: improving  Outcome Evaluation: Pt tolerated treatment fair this date. Required max-dep A x2 for bed mobility w/ use of draw sheet. Pt maintained sitting balance on EOB for a few minutes w/ between SBA-mod A, pt demonstrating heavy posterior lean when becoming fatigued. Pt was difficult to understand, though pointing to his R knee when attempting a few seated exercises. Unable to  tolerate much activity d/t weakness and fatigue.     Time Calculation:         PT Charges       Row Name 04/02/24 1024             Time Calculation    Start Time 0916  -      Stop Time 0931  -      Time Calculation (min) 15 min  -      PT Received On 04/02/24  -      PT - Next Appointment 04/03/24  -                User Key  (r) = Recorded By, (t) = Taken By, (c) = Cosigned By      Initials Name Provider Type     Mireya Portillo PTA Physical Therapist Assistant                  Therapy Charges for Today       Code Description Service Date Service Provider Modifiers Qty    87036932151 HC PT THERAPEUTIC ACT EA 15 MIN 4/2/2024 Mireya Portillo PTA GP 1    55148819435 HC PT THER SUPP EA 15 MIN 4/2/2024 Mireya Portillo, [place] GP 1            PT G-Codes  Outcome Measure Options: AM-PAC 6 Clicks Basic Mobility (PT)  AM-PAC 6 Clicks Score (PT): 8  AM-PAC 6 Clicks Score (OT): 8  PT Discharge Summary  Anticipated Discharge Disposition (PT): skilled nursing facility, extended care facility    Mireya Portillo PTA  4/2/2024

## 2024-04-02 NOTE — PROGRESS NOTES
Name: Anthony Gallegos ADMIT: 3/25/2024   : 1945  PCP: Provider, No Known    MRN: [id number] LOS: 8 days   AGE/SEX: 79 y.o. male  ROOM: [facility]     Subjective   Subjective     No events overnight. He motions to his throat and gurgles some but otherwise isn't able to communicate much.         Objective   Objective   Vital Signs  Temp:  [97.5 °F (36.4 °C)-98.1 °F (36.7 °C)] 97.5 °F (36.4 °C)  Heart Rate:  [74-86] 82  Resp:  [18] 18  BP: (108-114)/(55-64) 110/60  SpO2:  [99 %-100 %] 100 %  on   ;   Device (Oxygen Therapy): room air  Body mass index is 22.15 kg/m².  Physical Exam  Constitutional:       General: He is not in acute distress.     Appearance: He is ill-appearing. He is not toxic-appearing.   HENT:      Mouth/Throat:      Comments: Some white exudate in the back of his throat. No erythema  Cardiovascular:      Rate and Rhythm: Normal rate and regular rhythm.      Heart sounds: Normal heart sounds.   Pulmonary:      Effort: Pulmonary effort is normal.      Breath sounds: Normal breath sounds.   Abdominal:      General: Bowel sounds are normal.      Palpations: Abdomen is soft.      Comments: PEG, abdominal binder   Musculoskeletal:         General: No tenderness.      Right lower leg: No edema.      Left lower leg: No edema.   Neurological:      Mental Status: He is alert.         Results Review     I reviewed the patient's new clinical results.  Results from last 7 days   Lab Units 24  0320 24  0355 24  03424  0305   WBC 10*3/mm3 7.05 7.75 10.93* 10.45   HEMOGLOBIN g/dL 10.1* 10.3* 11.1* 11.1*   PLATELETS 10*3/mm3 327 383 422 467*     Results from last 7 days   Lab Units 24  1319 24  0320 24  1955 24  0355 24  0349 24  1755 24  1013   SODIUM mmol/L  --  146*  --  149* 149*  --  148*   POTASSIUM mmol/L 4.3 3.4* 3.9 3.4* 3.8   < > 3.2*   CHLORIDE mmol/L  --  113*  --  115* 116*  --  113*   CO2 mmol/L  --  25.3  --  26.1 24.2  --  25.0    BUN mg/dL  --  33*  --  33* 27*  --  28*   CREATININE mg/dL  --  0.99  --  0.97 0.99  --  0.98   GLUCOSE mg/dL  --  124*  --  102* 96  --  100*    < > = values in this interval not displayed.   Estimated Creatinine Clearance: 67.7 mL/min (by C-G formula based on SCr of 0.99 mg/dL).  Results from last 7 days   Lab Units 04/02/24 0320 04/01/24 0355 03/31/24 0349 03/30/24  0305 03/29/24  0345   ALBUMIN g/dL 2.3* 2.4* 2.3* 2.2* 2.5*   BILIRUBIN mg/dL  --   --   --   --  0.7   ALK PHOS U/L  --   --   --   --  84   AST (SGOT) U/L  --   --   --   --  25   ALT (SGPT) U/L  --   --   --   --  10     Results from last 7 days   Lab Units 04/02/24 0320 04/01/24 0355 03/31/24 0349 03/30/24  1013 03/30/24  0305   CALCIUM mg/dL 8.5* 8.1* 8.2* 8.3* 8.3*   ALBUMIN g/dL 2.3* 2.4* 2.3*  --  2.2*   MAGNESIUM mg/dL 1.9 1.9 2.1  --  2.2   PHOSPHORUS mg/dL 2.3* 2.2* 2.6  --  2.0*           COVID19   Date Value Ref Range Status   03/25/2024 Not Detected Not Detected - Ref. Range Final     Glucose   Date/Time Value Ref Range Status   04/02/2024 1133 106 70 - 130 mg/dL Final   04/02/2024 0631 123 70 - 130 mg/dL Final   04/02/2024 0014 94 70 - 130 mg/dL Final   04/01/2024 1737 98 70 - 130 mg/dL Final   04/01/2024 1154 103 70 - 130 mg/dL Final   04/01/2024 0635 108 70 - 130 mg/dL Final   04/01/2024 0036 112 70 - 130 mg/dL Final       MRI Brain Without Contrast  Narrative: BRAIN MRI WITHOUT CONTRAST     HISTORY: Mental status change, unknown cause; R63.8-Other symptoms and  signs concerning food and fluid intake; N17.9-Acute kidney failure,  unspecified; R52-Pain, unspecified; R13.10-Dysphagia, unspecified;  E86.0-Dehydration; R79.89-Other specified abnormal findings of blood  chemistry; R62.7-Adult failure to thrive; R53.1-Weakness; R63.4-Abnormal  weight loss; N39.0-Urinary tract infection, site not specifi     COMPARISON: March 25, 2024.     FINDINGS:  Multiplanar images of the head were obtained without  gadolinium. No areas of  restricted diffusion are seen to suggest acute  infarct. There is atrophy. There is extensive periventricular and deep  white matter microangiopathic change. There is no midline shift or mass  effect. Intracranial flow voids appear intact. Old lacunar infarcts are  noted within the left basal ganglia. No abnormality is seen on gradient  echo imaging. There may be some mucosal thickening within the ethmoid  sinuses.     Impression: 1. No acute intracranial abnormality.     This report was finalized on 3/29/2024 9:26 PM by Dr. Kirsten Peña M.D on Workstation: [id number]       Scheduled Medications  bisacodyl, 10 mg, Rectal, Once  enoxaparin, 40 mg, Subcutaneous, Q24H  lansoprazole, 30 mg, Per G Tube, Q AM  Menthol-Zinc Oxide, 1 Application, Topical, Q12H  polyethylene glycol, 17 g, Per G Tube, Daily  senna-docusate sodium, 2 tablet, Per G Tube, Daily  terazosin, 1 mg, Per G Tube, Nightly    Infusions   Diet  NPO Diet NPO Type: Strict NPO       Assessment/Plan     Active Hospital Problems    Diagnosis  POA    **UTI (urinary tract infection) [N39.0]  Yes    Encephalopathy [G93.40]  Yes    Decreased oral intake [R63.8]  Yes    Dysphagia [R13.10]  Yes    MARTITA (acute kidney injury) [N17.9]  Yes    Severe malnutrition [E43]  Yes    Hypertension [I10]  Yes      Resolved Hospital Problems   No resolved problems to display.       79 y.o. male admitted with UTI (urinary tract infection).    Patient is a 78 y.o. male with a history of HTN, BPH pyelonephritis/UTI, who presented to [facility] from facility for failure to thrive, not eating or drinking much, weight loss, decreased mobility.     ESBL UTI  -completed a course of ertapenem at the direction of ID    Gross hematuria/acute urinary retention  -felt to be secondary to UTI  -hematuria is resolved  -a1 blocker  -will attempt voiding trial just prior to discharge per urology recommendations     MARTITA/bilateral hydronephrosis  -Creatinine 2.5 on admission,  up from 0.66 on 01/29/2024  -CT scan showed mild-to-moderate bilateral hydronephrosis   -Status post Mariscal catheter placement 03/26/2024 per urology with resolution of MARTITA    Hypernatremia  -improved with increased free water flushes     Hypokalemia/hypophosphatemia  -replace via protocol     Encephalopathy  -Likely secondary to UTI/dehydration/malnutrition  -No history of dementia per daughter, at baseline oriented x 3  -CT head with no acute findings  -Follow-up MRI brain 03/29/2024 showed old stroke but no acute findings  -Improving, alert, partially oriented, largely nonverbal      Failure to thrive/malnutrition/decreased p.o. intake  -speech therapy evaluated, underwent VFSS 03/28/2024, found to have diffuse to be profound generalized pharyngeal weakness, silent aspiration.  Strict n.p.o. recommended  -Palliative care evaluated for goals of care discussion, family wanted to proceed with PEG tube placement.  -Status post PEG tube placement 03/29  -Continue tube feeds, tolerating     Constipation  -CT scan showed moderate gaseous distention of the colon with moderate amount of stool in the right colon and rectum.  -Patient refused suppository previously  -Status post Fleet enema 03/29, small BM afterwards  -Had multiple bowel movements overnight 03/30-03/31     Hypertension  -well controlled     Anemia of chronic disease  -Hemoglobin 10.1    GERD  -ppi    Pharyngeal secretions  -rapid strep is negative, culture pending  -ENT thinks this is non-infectious and recommends oral care  -I've added some chloraseptic spray prn    Lovenox 40 mg SC daily for DVT prophylaxis.  Full code.  Discussed with patient and consulting provider.  Anticipate discharge to SNU facility once arrangements have been made.      Gerson Townsend MD  [facility]  04/02/24  15:10 EDT    I wore protective equipment throughout this patient encounter including a face mask, gloves and protective eyewear.  Hand hygiene was  performed before donning protective equipment and after removal when leaving the room.

## 2024-04-02 NOTE — DISCHARGE PLACEMENT REQUEST
"Anthony Gallegos (79 y.o. Male)       Date of Birth   1945    Social Security Number       Address   [address] [facility] [address]    Home Phone   [phone]    MRN   [id number]       [facility]    Marital Status                               Admission Date   3/25/24    Admission Type   Emergency    Admitting Provider   Casie Reardon MD    Attending Provider   Gerson Townsend MD    Department, Room/Bed   [address], [address]       Discharge Date       Discharge Disposition       Discharge Destination                                 Attending Provider: Gerson Townsend MD    Allergies: No Known Allergies    Isolation: Contact   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), ESBL E coli (03/28/24)   Code Status: CPR    Ht: 190.5 cm (75\")   Wt: 80.4 kg (177 lb 4 oz)    Admission Cmt: None   Principal Problem: UTI (urinary tract infection) [N39.0]                   Active Insurance as of 3/25/2024       Primary Coverage       Payor Plan Insurance Group Employer/Plan Group    [employer]ETNA MEDICARE REPLACEMENT AETNA MED ADV POS [id number]       Payor Plan Address Payor Plan Phone Number Payor Plan Fax Number Effective Dates    [address] [phone]  12/1/2023 - None Entered    [facility] [id number]         Subscriber Name Subscriber Birth Date Member ID       ANTHONY GALLEGOS 1945 [id number]                     Emergency Contacts        (Rel.) Home Phone Work Phone Mobile Phone    NATHALIE DON (Daughter) [phone] -- --    MAGY GALLEGOS [phone] -- --                "

## 2024-04-02 NOTE — THERAPY TREATMENT NOTE
Acute Care - Speech Language Pathology   Swallow Treatment Note [facility]     Patient Name: Anthony Gallegos  : 1945  MRN: [id number]  Today's Date: 2024               Admit Date: 3/25/2024    Visit Dx:     ICD-10-CM ICD-9-CM   1. Decreased oral intake  R63.8 783.9   2. MARTITA (acute kidney injury)  N17.9 584.9   3. Whole body pain  R52 780.96   4. Dysphagia, unspecified type  R13.10 787.20   5. Dehydration  E86.0 276.51   6. Elevated troponin  R79.89 790.6   7. Failure to thrive in adult  R62.7 783.7   8. Generalized weakness  R53.1 780.79   9. Weight loss  R63.4 783.21   10. Urinary tract infection in male  N39.0 599.0     Patient Active Problem List   Diagnosis    Ankylosing spondylitis of cervical region    Recent unexplained weight loss    Abnormal serum lipase level    Abnormal serum level of amylase    Hypertension    Boil    Immobility    Fall from bed    Tetrahydrocannabinol (THC) use disorder, mild, abuse    Generalized pain        Grief    DISH (disseminated idiopathic skeletal hyperostosis)    Generalized weakness    Severe malnutrition    Altered mental status    Transient alteration of awareness    GERD without esophagitis    BPH (benign prostatic hyperplasia)    UTI (urinary tract infection)    Dehydration    MARTITA (acute kidney injury)    Hyperglycemia    Encephalopathy    Decreased oral intake    Dysphagia     Past Medical History:   Diagnosis Date    Abscess of scrotum     MARTITA (acute kidney injury)     Altered mental state     Arthritis     AS (ankylosing spondylitis)     History of MRSA infection     Hypertension     Leukocytosis     Tetrahydrocannabinol (THC) use disorder, mild, abuse 2023    Uveitis      Past Surgical History:   Procedure Laterality Date    COLONOSCOPY      ENDOSCOPY W/ PEG TUBE PLACEMENT N/A 3/29/2024    Procedure: ESOPHAGOGASTRODUODENOSCOPY WITH PERCUTANEOUS ENDOSCOPIC GASTROSTOMY TUBE INSERTION;  Surgeon: Khadar Broderick MD;  Location: [facility]  ENDOSCOPY;  Service: General;  Laterality: N/A;  PRE/POST - DYSPHAGIA    ROTATOR CUFF REPAIR Right     TOTAL HIP ARTHROPLASTY Left 2014       SLP Recommendation and Plan                SWALLOW EVALUATION (Last 72 Hours)       SLP Adult Swallow Evaluation       Row Name 04/02/24 1500                   Rehab Evaluation    Document Type therapy note (daily note)  -OC        Patient Observations lethargic  -OC        Patient Effort good  -OC        Symptoms Noted During/After Treatment none  -OC           General Information    Patient Profile Reviewed yes  -OC        Current Method of Nutrition NPO  -OC        Precautions/Limitations, Vision difficult to assess  -OC        Precautions/Limitations, Hearing WFL;for purposes of eval  -OC        Prior Level of Function-Communication unknown  -OC        Prior Level of Function-Swallowing soft to chew;thin liquids;other (see comments)  -OC        Barriers to Rehab medically complex  -OC        Patient's Goals for Discharge patient did not state  -OC           Pain    Additional Documentation --  Reported no pain, however RN reports sore throat frequently reported  -OC           (LTG) Patient will demonstrate functional swallow for    Barriers (Demonstrate functional swallow) SLP arrived for trial therapy and concern for weak voice. SLP suspects weak vocal quality due to generalized weakness. Patient able to intermittently achieve voice with SLP, to the same level of intelligbility as day of initial eval and VFSS in observation unit. Patient noted to be gurgly/wet with secretions- max cues to clear throat and attempt to swallow. RN reports frequent suctioning. SLP does not feel patient appropriate for hyolaryngeal strengthening exercises at this time. SLP would not initiate exercises until secretion management improves.  -OC                  User Key  (r) = Recorded By, (t) = Taken By, (c) = Cosigned By      Initials Name Effective Dates    OC Lilian, BRANDON Darden 08/28/23 -                      Section K & L                   EDUCATION  The patient has been educated in the following areas:   Dysphagia (Swallowing Impairment).     SLP GOALS       Row Name 04/02/24 1500             (LTG) Patient will demonstrate functional swallow for    Barriers (Demonstrate functional swallow) SLP arrived for trial therapy and concern for weak voice. SLP suspects weak vocal quality due to generalized weakness. Patient able to intermittently achieve voice with SLP, to the same level of intelligbility as day of initial eval and VFSS in observation unit. Patient noted to be gurgly/wet with secretions- max cues to clear throat and attempt to swallow. RN reports frequent suctioning. SLP does not feel patient appropriate for hyolaryngeal strengthening exercises at this time. SLP would not initiate exercises until secretion management improves. Patient continues to be at risk for aspiration secondary to secretions. -OC                User Key  (r) = Recorded By, (t) = Taken By, (c) = Cosigned By      Initials Name Provider Type    Katalina Moeller SLP Speech and Language Pathologist                       Time Calculation:    Time Calculation- SLP       Row Name 04/02/24 1516             Time Calculation- SLP    SLP Start Time 1500  -OC      SLP Received On 04/02/24  -OC         Untimed Charges    SLP Treatment ST Treatment Swallow Minutes  - 94790  -OC      [id number]-[id number] Treatment Swallow Minutes 45  -OC         Total Minutes    Untimed Charges Total Minutes 45  -OC       Total Minutes 45  -OC                User Key  (r) = Recorded By, (t) = Taken By, (c) = Cosigned By      Initials Name Provider Type    Katalina Moeller SLP Speech and Language Pathologist                    Therapy Charges for Today       Code Description Service Date Service Provider Modifiers Qty    28098239144  ST TREATMENT SWALLOW 3 4/2/2024 Katalina Quintana SLP GN 1                 BRANDON Crook  4/2/2024

## 2024-04-03 LAB
ALBUMIN SERPL-MCNC: 2.3 G/DL (ref 3.5–5.2)
ANION GAP SERPL CALCULATED.3IONS-SCNC: 6 MMOL/L (ref 5–15)
BUN SERPL-MCNC: 34 MG/DL (ref 8–23)
BUN/CREAT SERPL: 35.1 (ref 7–25)
CALCIUM SPEC-SCNC: 8.2 MG/DL (ref 8.6–10.5)
CHLORIDE SERPL-SCNC: 112 MMOL/L (ref 98–107)
CO2 SERPL-SCNC: 26 MMOL/L (ref 22–29)
CREAT SERPL-MCNC: 0.97 MG/DL (ref 0.76–1.27)
EGFRCR SERPLBLD CKD-EPI 2021: 78.9 ML/MIN/1.73
GLUCOSE BLDC GLUCOMTR-MCNC: 100 MG/DL (ref 70–130)
GLUCOSE BLDC GLUCOMTR-MCNC: 101 MG/DL (ref 70–130)
GLUCOSE BLDC GLUCOMTR-MCNC: 115 MG/DL (ref 70–130)
GLUCOSE BLDC GLUCOMTR-MCNC: 54 MG/DL (ref 70–130)
GLUCOSE BLDC GLUCOMTR-MCNC: 86 MG/DL (ref 70–130)
GLUCOSE SERPL-MCNC: 111 MG/DL (ref 65–99)
MAGNESIUM SERPL-MCNC: 1.7 MG/DL (ref 1.6–2.4)
PHOSPHATE SERPL-MCNC: 3 MG/DL (ref 2.5–4.5)
POTASSIUM SERPL-SCNC: 3.6 MMOL/L (ref 3.5–5.2)
POTASSIUM SERPL-SCNC: 3.8 MMOL/L (ref 3.5–5.2)
SODIUM SERPL-SCNC: 144 MMOL/L (ref 136–145)

## 2024-04-03 PROCEDURE — 97530 THERAPEUTIC ACTIVITIES: CPT

## 2024-04-03 PROCEDURE — 84132 ASSAY OF SERUM POTASSIUM: CPT | Performed by: STUDENT IN AN ORGANIZED HEALTH CARE EDUCATION/TRAINING PROGRAM

## 2024-04-03 PROCEDURE — 25010000002 ENOXAPARIN PER 10 MG: Performed by: STUDENT IN AN ORGANIZED HEALTH CARE EDUCATION/TRAINING PROGRAM

## 2024-04-03 PROCEDURE — 83735 ASSAY OF MAGNESIUM: CPT | Performed by: SURGERY

## 2024-04-03 PROCEDURE — 80069 RENAL FUNCTION PANEL: CPT | Performed by: INTERNAL MEDICINE

## 2024-04-03 PROCEDURE — 82948 REAGENT STRIP/BLOOD GLUCOSE: CPT

## 2024-04-03 RX ORDER — POTASSIUM CHLORIDE 1.5 G/1.58G
40 POWDER, FOR SOLUTION ORAL EVERY 4 HOURS
Status: COMPLETED | OUTPATIENT
Start: 2024-04-03 | End: 2024-04-03

## 2024-04-03 RX ADMIN — ANORECTAL OINTMENT 1 APPLICATION: 15.7; .44; 24; 20.6 OINTMENT TOPICAL at 10:19

## 2024-04-03 RX ADMIN — POLYETHYLENE GLYCOL 3350 17 G: 17 POWDER, FOR SOLUTION ORAL at 10:19

## 2024-04-03 RX ADMIN — POTASSIUM CHLORIDE 40 MEQ: 1.5 POWDER, FOR SOLUTION ORAL at 12:01

## 2024-04-03 RX ADMIN — LANSOPRAZOLE 30 MG: 15 TABLET, ORALLY DISINTEGRATING ORAL at 06:24

## 2024-04-03 RX ADMIN — DOCUSATE SODIUM 50MG AND SENNOSIDES 8.6MG 2 TABLET: 8.6; 5 TABLET, FILM COATED ORAL at 10:19

## 2024-04-03 RX ADMIN — TERAZOSIN HYDROCHLORIDE 1 MG: 1 CAPSULE ORAL at 21:36

## 2024-04-03 RX ADMIN — ENOXAPARIN SODIUM 40 MG: 100 INJECTION SUBCUTANEOUS at 18:06

## 2024-04-03 RX ADMIN — ANORECTAL OINTMENT 1 APPLICATION: 15.7; .44; 24; 20.6 OINTMENT TOPICAL at 21:48

## 2024-04-03 RX ADMIN — DEXTROSE MONOHYDRATE 25 G: 25 INJECTION, SOLUTION INTRAVENOUS at 18:09

## 2024-04-03 RX ADMIN — POTASSIUM CHLORIDE 40 MEQ: 1.5 POWDER, FOR SOLUTION ORAL at 06:54

## 2024-04-03 NOTE — PLAN OF CARE
Goal Outcome Evaluation:      Patient medsurg transfer from 5N this afternoon in a specialty bed. Daughter Whit at bedside. Tube feeds continued, accucheck was 54, dextrose given as he is NPO. Patient has a weak voice and vocal cords, speaking at a whisper. Turn q4. Alert and oriented x3. Will cont plan of care.

## 2024-04-03 NOTE — THERAPY TREATMENT NOTE
Patient Name: Anthony Gallegos  : 1945    MRN: [id number]                              Today's Date: 4/3/2024       Admit Date: 3/25/2024    Visit Dx:     ICD-10-CM ICD-9-CM   1. Decreased oral intake  R63.8 783.9   2. MARTITA (acute kidney injury)  N17.9 584.9   3. Whole body pain  R52 780.96   4. Dysphagia, unspecified type  R13.10 787.20   5. Dehydration  E86.0 276.51   6. Elevated troponin  R79.89 790.6   7. Failure to thrive in adult  R62.7 783.7   8. Generalized weakness  R53.1 780.79   9. Weight loss  R63.4 783.21   10. Urinary tract infection in male  N39.0 599.0     Patient Active Problem List   Diagnosis    Ankylosing spondylitis of cervical region    Recent unexplained weight loss    Abnormal serum lipase level    Abnormal serum level of amylase    Hypertension    Boil    Immobility    Fall from bed    Tetrahydrocannabinol (THC) use disorder, mild, abuse    Generalized pain        Grief    DISH (disseminated idiopathic skeletal hyperostosis)    Generalized weakness    Severe malnutrition    Altered mental status    Transient alteration of awareness    GERD without esophagitis    BPH (benign prostatic hyperplasia)    UTI (urinary tract infection)    Dehydration    MARTITA (acute kidney injury)    Hyperglycemia    Encephalopathy    Decreased oral intake    Dysphagia     Past Medical History:   Diagnosis Date    Abscess of scrotum     MARTITA (acute kidney injury)     Altered mental state     Arthritis     AS (ankylosing spondylitis)     History of MRSA infection     Hypertension     Leukocytosis     Tetrahydrocannabinol (THC) use disorder, mild, abuse 2023    Uveitis      Past Surgical History:   Procedure Laterality Date    COLONOSCOPY      ENDOSCOPY W/ PEG TUBE PLACEMENT N/A 3/29/2024    Procedure: ESOPHAGOGASTRODUODENOSCOPY WITH PERCUTANEOUS ENDOSCOPIC GASTROSTOMY TUBE INSERTION;  Surgeon: Khadar Broderick MD;  Location: [facility] ENDOSCOPY;  Service: General;  Laterality: N/A;  PRE/POST -  DYSPHAGIA    ROTATOR CUFF REPAIR Right     TOTAL HIP ARTHROPLASTY Left 2014      General Information       Row Name 04/03/24 1516          Physical Therapy Time and Intention    Document Type therapy note (daily note)  -     Mode of Treatment physical therapy  -       Row Name 04/03/24 1516          General Information    Existing Precautions/Restrictions fall  -       Row Name 04/03/24 1516          Cognition    Orientation Status (Cognition) unable/difficult to assess  difficult to understand pt  -       Row Name 04/03/24 1516          Safety Issues, Functional Mobility    Comment, Safety Issues/Impairments (Mobility) Co treatment medically appropriate and necessary due to patient acuity level, activity tolerance and safety of patient and staff. Treatment is focusing on progression of care and goals established in the POC.  -               User Key  (r) = Recorded By, (t) = Taken By, (c) = Cosigned By      Initials Name Provider Type     Mireya Portillo PTA Physical Therapist Assistant                   Mobility       Row Name 04/03/24 1519          Bed Mobility    Bed Mobility supine-sit;sit-supine  -     Supine-Sit [place] (Bed Mobility) maximum assist (25% patient effort);2 person assist  -     Sit-Supine [place] (Bed Mobility) maximum assist (25% patient effort);dependent (less than 25% patient effort);2 person assist  -     Assistive Device (Bed Mobility) bed rails;head of bed elevated;draw sheet  -       Row Name 04/03/24 1519          Sit-Stand Transfer    Comment, (Sit-Stand Transfer) unable d/t weakness/fatigue while seated  -               User Key  (r) = Recorded By, (t) = Taken By, (c) = Cosigned By      Initials Name Provider Type     Mireya Portillo PTA Physical Therapist Assistant                   Obj/Interventions       Row Name 04/03/24 1520          Motor Skills    Therapeutic Exercise --  seated LAQ x5 reps  -       Row Name 04/03/24 1520           Balance    Comment, Balance sitting balance on EOB for a few minutes w/ mostly CGA-min A; once fatigued, pt w/ heavy posterior lean and requested to lie back down  -SM               User Key  (r) = Recorded By, (t) = Taken By, (c) = Cosigned By      Initials Name Provider Type    Mireya Kang PTA Physical Therapist Assistant                   Goals/Plan    No documentation.                  Clinical Impression       Row Name 04/03/24 1521          Pain    Pretreatment Pain Rating 0/10 - no pain  -SM     Posttreatment Pain Rating 0/10 - no pain  -SM       Row Name 04/03/24 1521          Positioning and Restraints    Pre-Treatment Position in bed  -SM     Post Treatment Position bed  -SM     In Bed supine;call light within reach;encouraged to call for assist  -               User Key  (r) = Recorded By, (t) = Taken By, (c) = Cosigned By      Initials Name Provider Type    Mireya Kang PTA Physical Therapist Assistant                   Outcome Measures       Row Name 04/03/24 1527          How much help from another person do you currently need...    Turning from your back to your side while in flat bed without using bedrails? 2  -SM     Moving from lying on back to sitting on the side of a flat bed without bedrails? 2  -SM     Moving to and from a bed to a chair (including a wheelchair)? 1  -SM     Standing up from a chair using your arms (e.g., wheelchair, bedside chair)? 1  -SM     Climbing 3-5 steps with a railing? 1  -SM     To walk in hospital room? 1  -SM     AM-PAC 6 Clicks Score (PT) 8  -     Highest Level of Mobility Goal 3 --> Sit at edge of bed  -       Row Name 04/03/24 1527          Functional Assessment    Outcome Measure Options AM-PAC 6 Clicks Basic Mobility (PT)  -               User Key  (r) = Recorded By, (t) = Taken By, (c) = Cosigned By      Initials Name Provider Type    Mireya Kang PTA Physical Therapist Assistant                                 Physical  Therapy Education       Title: PT OT SLP Therapies (In Progress)       Topic: Physical Therapy (Done)       Point: Mobility training (Done)       Learning Progress Summary             Patient Acceptance, E,TB,D, VU,NR by  at 4/3/2024 1530    Acceptance, E,D, NR by SM at 4/2/2024 1019    Acceptance, E, NR,VU by DB at 4/1/2024 1531    Acceptance, E, NR by SM1 at 3/29/2024 1129    Acceptance, E,D, NR by MS at 3/27/2024 1540                         Point: Home exercise program (Done)       Learning Progress Summary             Patient Acceptance, E,TB,D, VU,NR by  at 4/3/2024 1530    Acceptance, E,D, NR by SM at 4/2/2024 1019    Acceptance, E, NR,VU by DB at 4/1/2024 1531    Acceptance, E, NR by SM1 at 3/29/2024 1129    Acceptance, E,D, NR by MS at 3/27/2024 1540                         Point: Body mechanics (Done)       Learning Progress Summary             Patient Acceptance, E,TB,D, VU,NR by SM at 4/3/2024 1530    Acceptance, E,D, NR by SM at 4/2/2024 1019    Acceptance, E, NR,VU by DB at 4/1/2024 1531    Acceptance, E, NR by 1 at 3/29/2024 1129    Acceptance, E,D, NR by MS at 3/27/2024 1540                         Point: Precautions (Done)       Learning Progress Summary             Patient Acceptance, E,TB,D, VU,NR by  at 4/3/2024 1530    Acceptance, E,D, NR by  at 4/2/2024 1019    Acceptance, E, NR,VU by DB at 4/1/2024 1531    Acceptance, E, NR by 1 at 3/29/2024 1129    Acceptance, E,D, NR by MS at 3/27/2024 1540                                         User Key       Initials Effective Dates Name Provider Type Discipline    MS 06/16/21 -  Landon Townsend, PT Physical Therapist PT     03/07/18 -  Mireya Portillo, PTA Physical Therapist Assistant PT    DB 06/16/21 -  Felipa Weathers, PT Physical Therapist PT    [facility] 05/02/22 -  Sussy Walker, PT Physical Therapist PT                  PT Recommendation and Plan     Plan of Care Reviewed With: patient  Progress: improving  Outcome Evaluation:  Pt tolerated treatment fair this date. Required max-dep A x2 for bed mobility, then maintained sitting balance for a few minutes w/ CGA-min A. Pt completed a few seated exercises, then suddenly demonstrated heavy posterior lean and requested to lie back down. Limited d/t fatigue.     Time Calculation:         PT Charges       Row Name 04/03/24 1532             Time Calculation    Start Time 1414  -      Stop Time 1429  -      Time Calculation (min) 15 min  -      PT Received On 04/03/24  -      PT - Next Appointment 04/04/24  -                User Key  (r) = Recorded By, (t) = Taken By, (c) = Cosigned By      Initials Name Provider Type     Mireya Portillo PTA Physical Therapist Assistant                  Therapy Charges for Today       Code Description Service Date Service Provider Modifiers Qty    38938072202 HC PT THERAPEUTIC ACT EA 15 MIN 4/2/2024 Mireya Portillo, [place] GP 1    42194044127 HC PT THER SUPP EA 15 MIN 4/2/2024 Mireya Portillo PTA GP 1    57581188279 HC PT THERAPEUTIC ACT EA 15 MIN 4/3/2024 Mireya Portillo, [place] GP 1            PT G-Codes  Outcome Measure Options: AM-PAC 6 Clicks Basic Mobility (PT)  AM-PAC 6 Clicks Score (PT): 8  AM-PAC 6 Clicks Score (OT): 8  PT Discharge Summary  Anticipated Discharge Disposition (PT): skilled nursing facility, extended care facility    Mireya Portillo PTA  4/3/2024

## 2024-04-03 NOTE — THERAPY TREATMENT NOTE
Patient Name: Anthony Gallegos  : 1945    MRN: [id number]                              Today's Date: 4/3/2024       Admit Date: 3/25/2024    Visit Dx:     ICD-10-CM ICD-9-CM   1. Decreased oral intake  R63.8 783.9   2. MARTITA (acute kidney injury)  N17.9 584.9   3. Whole body pain  R52 780.96   4. Dysphagia, unspecified type  R13.10 787.20   5. Dehydration  E86.0 276.51   6. Elevated troponin  R79.89 790.6   7. Failure to thrive in adult  R62.7 783.7   8. Generalized weakness  R53.1 780.79   9. Weight loss  R63.4 783.21   10. Urinary tract infection in male  N39.0 599.0     Patient Active Problem List   Diagnosis    Ankylosing spondylitis of cervical region    Recent unexplained weight loss    Abnormal serum lipase level    Abnormal serum level of amylase    Hypertension    Boil    Immobility    Fall from bed    Tetrahydrocannabinol (THC) use disorder, mild, abuse    Generalized pain        Grief    DISH (disseminated idiopathic skeletal hyperostosis)    Generalized weakness    Severe malnutrition    Altered mental status    Transient alteration of awareness    GERD without esophagitis    BPH (benign prostatic hyperplasia)    UTI (urinary tract infection)    Dehydration    MARTITA (acute kidney injury)    Hyperglycemia    Encephalopathy    Decreased oral intake    Dysphagia     Past Medical History:   Diagnosis Date    Abscess of scrotum     MARTITA (acute kidney injury)     Altered mental state     Arthritis     AS (ankylosing spondylitis)     History of MRSA infection     Hypertension     Leukocytosis     Tetrahydrocannabinol (THC) use disorder, mild, abuse 2023    Uveitis      Past Surgical History:   Procedure Laterality Date    COLONOSCOPY      ENDOSCOPY W/ PEG TUBE PLACEMENT N/A 3/29/2024    Procedure: ESOPHAGOGASTRODUODENOSCOPY WITH PERCUTANEOUS ENDOSCOPIC GASTROSTOMY TUBE INSERTION;  Surgeon: Khadar Broderick MD;  Location: [facility] ENDOSCOPY;  Service: General;  Laterality: N/A;  PRE/POST -  DYSPHAGIA    ROTATOR CUFF REPAIR Right     TOTAL HIP ARTHROPLASTY Left 2014      General Information       Row Name 04/03/24 1559          OT Time and Intention    Document Type therapy note (daily note)  -RB     Mode of Treatment occupational therapy;co-treatment;physical therapy  low pt activity tolerance  -       Row Name 04/03/24 1556          General Information    Patient Profile Reviewed yes  -[facility] Name 04/03/24 1559          Cognition    Orientation Status (Cognition) unable/difficult to assess;other (see comments)  pt did not verbalize, did communicate with gestures  -RB               User Key  (r) = Recorded By, (t) = Taken By, (c) = Cosigned By      Initials Name Provider Type    Sarah Carrillo OT Occupational Therapist                     Mobility/ADL's    No documentation.                  Obj/Interventions       [facility] Name 04/03/24 1558          Shoulder (Therapeutic Exercise)    Shoulder (Therapeutic Exercise) AROM (active range of motion)  -     Shoulder AROM (Therapeutic Exercise) bilateral;flexion;extension;5 repetitions;sitting;scapular retraction;scapular protraction  -[facility] Name 04/03/24 1558          Elbow/Forearm (Therapeutic Exercise)    Elbow/Forearm (Therapeutic Exercise) AROM (active range of motion)  -RB     Elbow/Forearm AROM (Therapeutic Exercise) bilateral;flexion;extension;5 repetitions;sitting  -       Row Name 04/03/24 1558          Motor Skills    Therapeutic Exercise shoulder;elbow/forearm  -[facility] Name 04/03/24 1558          Balance    Comment, Balance CGA-Min A x10 min EOB sitting, posterior/R lateral lean  -RB               User Key  (r) = Recorded By, (t) = Taken By, (c) = Cosigned By      Initials Name Provider Type    Sarah Carrillo OT Occupational Therapist                   Goals/Plan    No documentation.                  Clinical Impression       [facility] Name 04/03/24 1554          Pain Assessment    Pretreatment Pain Rating 0/10 - no  pain  -RB     Posttreatment Pain Rating 0/10 - no pain  -RB       Row Name 04/03/24 155          Plan of Care Review    Plan of Care Reviewed With patient  -RB     Progress improving  -RB     Outcome Evaluation The pt participated in OT/PT this afternoon. She was eager to participate in OOB activity. She completed bed mobility with Mod A x2. Total A LBD. AAROM of her RUE completed - severe edema present greater than her LUE. She stood with CGA/Min A + walker and total A provided for posterior hygiene after experiencing a small BM. She pivoted to her bedside chair where she utilized a oral care kit after set-up.      Anticipated Discharge Disposition (OT): skilled nursing facility  -       Row Name 04/03/24 7677          Therapy Assessment/Plan (OT)    Rehab Potential (OT) good, to achieve stated therapy goals  -RB     Criteria for Skilled Therapeutic Interventions Met (OT) yes;skilled treatment is necessary  -RB     Therapy Frequency (OT) 3 times/wk  -RB       Row Name 04/03/24 9569          Therapy Plan Review/Discharge Plan (OT)    Anticipated Discharge Disposition (OT) skilled nursing facility  -       Row Name 04/03/24 1550          Vital Signs    O2 Delivery Pre Treatment room air  -RB     O2 Delivery Intra Treatment room air  -RB     O2 Delivery Post Treatment room air  -RB     Pre Patient Position Supine  -RB     Intra Patient Position Sitting  -RB     Post Patient Position Supine  -RB       Row Name 04/03/24 1179          Positioning and Restraints    Pre-Treatment Position in bed  -RB     Post Treatment Position bed  -RB     In Bed supine;call light within reach;notified nsg;encouraged to call for assist;exit alarm on;fowlers  -RB               User Key  (r) = Recorded By, (t) = Taken By, (c) = Cosigned By      Initials Name Provider Type    RB Sarah Beck, OT Occupational Therapist                   Outcome Measures       Row Name 04/03/24 2747          How much help from another person do you  currently need...    Turning from your back to your side while in flat bed without using bedrails? 2  -SM     Moving from lying on back to sitting on the side of a flat bed without bedrails? 2  -SM     Moving to and from a bed to a chair (including a wheelchair)? 1  -SM     Standing up from a chair using your arms (e.g., wheelchair, bedside chair)? 1  -SM     Climbing 3-5 steps with a railing? 1  -SM     To walk in hospital room? 1  -SM     AM-PAC 6 Clicks Score (PT) 8  -SM     Highest Level of Mobility Goal 3 --> Sit at edge of bed  -SM       Row Name 04/03/24 1527          Functional Assessment    Outcome Measure Options AM-PAC 6 Clicks Basic Mobility (PT)  -               User Key  (r) = Recorded By, (t) = Taken By, (c) = Cosigned By      Initials Name Provider Type    Mireya Kang, TULIO Physical Therapist Assistant                    Occupational Therapy Education       Title: PT OT SLP Therapies (In Progress)       Topic: Occupational Therapy (In Progress)       Point: ADL training (Done)       Description:   Instruct learner(s) on proper safety adaptation and remediation techniques during self care or transfers.   Instruct in proper use of assistive devices.                  Learning Progress Summary             Patient Acceptance, E, VU,NR by PP at 3/31/2024 1008    Comment: Pt Ed on OT role, d/c planning and call light function. Difficult to assess understanding.                         Point: Home exercise program (Not Started)       Description:   Instruct learner(s) on appropriate technique for monitoring, assisting and/or progressing therapeutic exercises/activities.                  Learner Progress:  Not documented in this visit.              Point: Precautions (Not Started)       Description:   Instruct learner(s) on prescribed precautions during self-care and functional transfers.                  Learner Progress:  Not documented in this visit.              Point: Body mechanics (Not  Started)       Description:   Instruct learner(s) on proper positioning and spine alignment during self-care, functional mobility activities and/or exercises.                  Learner Progress:  Not documented in this visit.                              User Key       Initials Effective Dates Name Provider Type Discipline    PP 06/09/23 -  Tracy Vega OT Occupational Therapist OT                  OT Recommendation and Plan  Therapy Frequency (OT): 3 times/wk  Plan of Care Review  Plan of Care Reviewed With: patient  Progress: improving  Outcome Evaluation: The pt participated in OT/PT this afternoon. She was eager to participate in OOB activity. She completed bed mobility with Mod A x2. Total A LBD. AAROM of her RUE completed - severe edema present greater than her LUE. She stood with CGA/Min A + walker and total A provided for posterior hygiene after experiencing a small BM. She pivoted to her bedside chair where she utilized a oral care kit after set-up.      Anticipated Discharge Disposition (OT): skilled nursing facility     Time Calculation:         Time Calculation- OT       Row Name 04/03/24 1557             Time Calculation- OT    OT Start Time 1414  -RB      OT Stop Time 1429  -RB      OT Time Calculation (min) 15 min  -RB      Total Timed Code Minutes- OT 15 minute(s)  -RB      OT Received On 04/03/24  -RB      OT - Next Appointment 04/04/24  -RB         Timed Charges    88303 - OT Therapeutic Activity Minutes 15  -RB         Total Minutes    Timed Charges Total Minutes 15  -RB       Total Minutes 15  -RB                User Key  (r) = Recorded By, (t) = Taken By, (c) = Cosigned By      Initials Name Provider Type    RB Sarah Beck OT Occupational Therapist                  Therapy Charges for Today       Code Description Service Date Service Provider Modifiers Qty    [id number]  OT THERAPEUTIC ACT EA 15 MIN 4/3/2024 Sarah Beck OT GO 1                 Sarah Beck  OT  4/3/2024

## 2024-04-03 NOTE — PROGRESS NOTES
"Nutrition Services    Patient Name:  Anthony Gallegos  YOB: 1945  MRN: [id number]  Admit Date:  3/25/2024    Assessment Date:  04/03/24    Comments: TF follow up    Tolerating TF well. NS renal infusing at 50mL/hr goal rate. 300 mL q 4 water flushes. Not appropriate for trial therapy per SLP d/t gurgling secretions. Still not able to communicate much. Nephrology signed off today. Hypernatremia resolved. Will continue to monitor d/t high water flush amount. Culture pending of pharyngeal secretions.     Labs: Na 144, Glu 100/111/86/101, BUN 34  Meds: Miralax, pericolace,  KCl, pericolace  Last BM: 4/1    Recommendations:  Continue NS renal at 50mL/hr goal rate.   300 mL q 4 water flushes.   Monitor Na+ lab.  Monitor swallow function    RD to follow tolerance, labs and clinical course.     CLINICAL NUTRITION ASSESSMENT      Reason for Assessment Follow-up Protocol, TF Assessment      Diagnosis/Problem   UTI   Medical/Surgical History Past Medical History:   Diagnosis Date    Abscess of scrotum     MARTITA (acute kidney injury)     Altered mental state     Arthritis     AS (ankylosing spondylitis)     History of MRSA infection     Hypertension     Leukocytosis     Tetrahydrocannabinol (THC) use disorder, mild, abuse 12/20/2023    Uveitis        Past Surgical History:   Procedure Laterality Date    COLONOSCOPY      ENDOSCOPY W/ PEG TUBE PLACEMENT N/A 3/29/2024    Procedure: ESOPHAGOGASTRODUODENOSCOPY WITH PERCUTANEOUS ENDOSCOPIC GASTROSTOMY TUBE INSERTION;  Surgeon: Khadar Broderick MD;  Location: [facility] ENDOSCOPY;  Service: General;  Laterality: N/A;  PRE/POST - DYSPHAGIA    ROTATOR CUFF REPAIR Right     TOTAL HIP ARTHROPLASTY Left 2014        Anthropometrics        Current Height  Current Weight  BMI kg/m2 Height: 190.5 cm (75\")  Weight: 81.4 kg (179 lb 7.3 oz) (04/03/24 0521)  Body mass index is 22.43 kg/m².   Adjusted BMI (if applicable)    BMI Category Normal/Healthy (18.4 - 24.9)   Ideal Body " Weight (IBW) 196 lb (89.1 kg)   Usual Body Weight (UBW) 152-188 lb   Weight Trend Loss, Amount/Timeframe: 36 lb (19.1%) x 2 months   Weight History Wt Readings from Last 30 Encounters:   04/03/24 0521 81.4 kg (179 lb 7.3 oz)   04/02/24 0435 80.4 kg (177 lb 4 oz)   04/01/24 0420 80.9 kg (178 lb 5.6 oz)   03/31/24 0530 82.7 kg (182 lb 6.4 oz)   03/30/24 0557 82.2 kg (181 lb 3.5 oz)   03/29/24 0548 82.6 kg (182 lb 1.6 oz)   03/28/24 0500 76.9 kg (169 lb 8 oz)   03/27/24 0617 76.4 kg (168 lb 6.4 oz)   03/25/24 2123 69.1 kg (152 lb 6.4 oz)   03/25/24 1516 79 kg (174 lb 2.6 oz)   01/31/24 0639 79 kg (174 lb 3.2 oz)   01/29/24 0509 78.8 kg (173 lb 11.6 oz)   01/28/24 0530 77.7 kg (171 lb 4.8 oz)   01/25/24 0537 76.4 kg (168 lb 6.9 oz)   01/23/24 0542 73.2 kg (161 lb 6 oz)   01/22/24 0502 75.1 kg (165 lb 9.1 oz)   01/21/24 2304 75.5 kg (166 lb 7.2 oz)   01/21/24 1744 81.6 kg (180 lb)   01/08/24 0253 81.6 kg (179 lb 14.3 oz)   01/05/24 0440 87.9 kg (193 lb 12.6 oz)   01/04/24 0439 85.6 kg (188 lb 11.4 oz)   01/03/24 0513 82.5 kg (181 lb 14.1 oz)   12/31/23 1300 80.5 kg (177 lb 7.5 oz)   12/31/23 0350 83.8 kg (184 lb 11.9 oz)   12/30/23 0511 85.3 kg (188 lb 0.8 oz)   12/29/23 0341 85.7 kg (188 lb 15 oz)   12/28/23 0435 85.4 kg (188 lb 4.4 oz)   12/27/23 0755 81 kg (178 lb 9.2 oz)   12/27/23 0423 85.4 kg (188 lb 4.4 oz)   12/25/23 0707 85.2 kg (187 lb 13.3 oz)   12/23/23 0608 81.3 kg (179 lb 3.7 oz)   12/22/23 0430 81.8 kg (180 lb 5.4 oz)   12/20/23 1005 81.9 kg (180 lb 8 oz)   03/27/18 1439 100 kg (221 lb)   03/22/18 1613 101 kg (222 lb 12.8 oz)   01/23/18 1414 101 kg (223 lb)   12/26/17 1111 99.8 kg (220 lb)   10/19/17 1239 95.3 kg (210 lb)   08/30/17 1459 91.3 kg (201 lb 3.2 oz)   08/29/17 1221 93 kg (205 lb)   08/10/17 1517 88 kg (194 lb)   07/14/17 1041 85.9 kg (189 lb 6.4 oz)   06/29/17 1318 84.5 kg (186 lb 3.2 oz)   06/21/17 1922 90.7 kg (200 lb)      --  Estimated/Assessed Needs        Current Weight  Weight: 81.4 kg  (179 lb 7.3 oz) (04/03/24 0521)       Energy Requirements    Weight for Calculation 152 lb (69.1 kg)   Method for Estimation  30-35 kcal/kg   EST Needs (kcal/day) 0808-4296       Protein Requirements    Weight for Calculation 152 lb (69.1 kg)   EST Protein Needs (g/kg) 1.2 - 1.5 gm/kg   EST Daily Needs (g/day)        Fluid Requirements     Method for Estimation 1 mL/kcal    EST Needs (mL/day)      Labs       Pertinent Labs    Results from last 7 days   Lab Units 04/03/24  0406 04/02/24  1319 04/02/24  0320 04/01/24  1955 04/01/24  0355 03/30/24  0305 03/29/24  0345   SODIUM mmol/L 144  --  146*  --  149*   < > 151*   POTASSIUM mmol/L 3.6 4.3 3.4*   < > 3.4*   < > 3.4*   CHLORIDE mmol/L 112*  --  113*  --  115*   < > 118*   CO2 mmol/L 26.0  --  25.3  --  26.1   < > 23.1   BUN mg/dL 34*  --  33*  --  33*   < > 50*   CREATININE mg/dL 0.97  --  0.99  --  0.97   < > 1.69*   CALCIUM mg/dL 8.2*  --  8.5*  --  8.1*   < > 8.5*   BILIRUBIN mg/dL  --   --   --   --   --   --  0.7   ALK PHOS U/L  --   --   --   --   --   --  84   ALT (SGPT) U/L  --   --   --   --   --   --  10   AST (SGOT) U/L  --   --   --   --   --   --  25   GLUCOSE mg/dL 111*  --  124*  --  102*   < > 126*    < > = values in this interval not displayed.     Results from last 7 days   Lab Units 04/03/24  0406 04/02/24 0320 04/01/24  0355   MAGNESIUM mg/dL 1.7 1.9 1.9   PHOSPHORUS mg/dL 3.0 2.3* 2.2*   HEMOGLOBIN g/dL  --  10.1* 10.3*   HEMATOCRIT %  --  32.0* 31.8*   WBC 10*3/mm3  --  7.05 7.75   ALBUMIN g/dL 2.3* 2.3* 2.4*     Results from last 7 days   Lab Units 04/02/24  0320 04/01/24  0355 03/31/24  0349 03/30/24  0305 03/29/24  0345   PLATELETS 10*3/mm3 327 383 422 467* 481*     COVID19   Date Value Ref Range Status   03/25/2024 Not Detected Not Detected - Ref. Range Final     Lab Results   Component Value Date    HGBA1C 5.80 (H) 01/23/2024          Medications           Scheduled Medications bisacodyl, 10 mg, Rectal, Once  enoxaparin, 40 mg,  Subcutaneous, Q24H  lansoprazole, 30 mg, Per G Tube, Q AM  Menthol-Zinc Oxide, 1 Application, Topical, Q12H  polyethylene glycol, 17 g, Per G Tube, Daily  senna-docusate sodium, 2 tablet, Per G Tube, Daily  terazosin, 1 mg, Per G Tube, Nightly       Infusions       PRN Medications   acetaminophen    senna-docusate sodium **AND** [DISCONTINUED] polyethylene glycol **AND** bisacodyl **AND** bisacodyl    dextrose    dextrose    glucagon (human recombinant)    melatonin    ondansetron ODT **OR** ondansetron    phenol    Potassium Replacement - Follow Nurse / BPA Driven Protocol    [COMPLETED] Insert Peripheral IV **AND** sodium chloride     Physical Findings          General Findings confused, disoriented, loss of muscle mass, loss of subcutaneous fat, room air   Oral/Mouth Cavity WDL   Edema  no edema   Gastrointestinal hypoactive bowel sounds, non-distended    Skin  bruising, pressure injury: st II coccyx, other: abrasion   Tubes/Drains/Lines none   NFPE See Malnutrition Severity Assessment, Date Completed: 3/26   --  Malnutrition Severity Assessment      Patient meets criteria for : Severe Malnutrition         Current Nutrition Orders & Evaluation of Intake       Oral Nutrition     Food Allergies NKFA   Current PO Diet NPO Diet NPO Type: Strict NPO   Supplement n/a   PO Evaluation     % PO Intake N/a    Factors Affecting Intake: swallow impairment      Enteral Nutrition     Enteral Route PEG    TF Delivery Method Continuous    Propofol Rate/Kcal     Current TF Order/Rate  Novasource Renal @ 50 mL/hr    TF Goal Rate 50 mL/hr    Current Water Flush 250 mL Q 6 hr    Modular None    TF Residual  no or minimal residual    TF Tolerance tolerating    TF Observation Verified correct TF and water flush infusing per orders     PES STATEMENT / NUTRITION DIAGNOSIS      Nutrition Dx Problem  Problem: Malnutrition (severe)  Etiology: Factors Affecting Nutrition - poor PO, weakness, decreased mobility    Signs/Symptoms: NPO, Report  of Minimal PO Intake, NFPE Results, Unintended Weight Change, and Report/Observation     NUTRITION INTERVENTION / PLAN OF CARE      Intervention Goal(s) Maintain nutrition status, Reduce/improve symptoms, Meet estimated needs, Disease management/therapy, Initiate feeding/diet, and Appropriate weight gain         RD Intervention/Action Await initiation/advancement of PO diet, Await initiation of EN/PN, Continue to monitor, and Care plan reviewed     Nutrition Prescription        Enteral Prescription:     Enteral Route PEG    TF Delivery Method Continuous    Enteral Product Novasource Renal    Modular None    Propofol Rate/Kcal     TF Start Rate/Volume  20 mL    TF Goal Rate/Volume 50 mL     Free Water Flush Per Nephrology (Na 151)    TF Provision at Goal:          Calories 2200 kcal, meets 100 % needs         Protein  100 gm protein, meets 100 % needs         Fluid (mL) Per Nephrology (Na 151)    Prescription Ordered No, recommended     --      Monitor/Evaluation Per protocol   Discharge Plan/Needs Pending clinical course   --    RD to follow per protocol.      Electronically signed by:  Carolyn Olmos  04/03/24 15:11 EDT

## 2024-04-03 NOTE — PROGRESS NOTES
[facility]     Patient Name: Anthony Gallegos  MRN: [id number]  Today's Date: 4/3/2024    Admit Date: 3/25/2024    Plan: DC to medicaid pending bed, will need ambulance transportation   Discharge Needs Assessment    No documentation.                  Discharge Plan       Row Name 04/03/24 1656       Plan    Plan Comments The patient transferred to [facility] from [address] [place] [address] on 4/3/24. The patient will need a medicaid pending bed. Need to follow up with the family about facilities. VIRI Prabhakar RN, CCP.                  Continued Care and Services - Admitted Since 3/25/2024       Destination       Service Provider Request Status Selected Services Address Phone Fax Patient Preferred    [facility] Considering N/A [address] [facility] [address] [phone] --    [facility] Declined  Not eligible N/A [address] [facility] [address] [phone] --    [facility] Declined N/A [address] [facility] [address] [phone] --    [facility] Declined N/A [address] [facility] [address] [phone] --    [facility] Declined N/A [address] [facility] [address] [phone] --              Home Medical Care       Service Provider Request Status Selected Services Address Phone Fax Patient Preferred    [facility] Declined  Insurance Denial N/A [address] [facility] [address] [phone] --                  Selected Continued Care - Prior Encounters Includes continued care and service providers with selected services from prior encounters from 12/26/2023 to 4/3/2024      Discharged on 1/31/2024 Admission date: 1/21/2024 - Discharge disposition: Skilled Nursing Facility (DC - External)      Destination       Service Provider Selected Services Address Phone Fax  Patient Preferred    [facility] [address] [facility] [address] [phone] --                      Discharged on 1/8/2024 Admission date: 12/20/2023 - Discharge disposition: Skilled Nursing Facility (DC - External)      Destination       Service Provider Selected Services Address Phone Fax Patient Preferred    [facility] [address] [facility] [address] [phone] --                          Expected Discharge Date and Time       Expected Discharge Date Expected Discharge Time    Apr 4, 2024            Demographic Summary    No documentation.                  Functional Status    No documentation.                  Psychosocial    No documentation.                  Abuse/Neglect    No documentation.                  Legal    No documentation.                  Substance Abuse    No documentation.                  Patient Forms    No documentation.                     Delaney Prabhakar RN

## 2024-04-03 NOTE — PLAN OF CARE
Goal Outcome Evaluation:  Plan of Care Reviewed With: patient        Progress: improving  Outcome Evaluation: Pt tolerated treatment fair this date. Required max-dep A x2 for bed mobility, then maintained sitting balance for a few minutes w/ CGA-min A. Pt completed a few seated exercises, then suddenly demonstrated heavy posterior lean and requested to lie back down. Limited d/t fatigue.      Anticipated Discharge Disposition (PT): skilled nursing facility, extended care facility

## 2024-04-03 NOTE — PLAN OF CARE
Goal Outcome Evaluation:  Plan of Care Reviewed With: patient        Progress: improving  Outcome Evaluation: completed IV abx for UTI. forde in place, forde care completed. denies pain. oral care completed. q2h turns. wound care completed. TF running at goal and is tolerated. VSS. room air.

## 2024-04-03 NOTE — PROGRESS NOTES
[facility] Progress Note      Patient Name: Anthony Gallegos  : 1945  MRN: [id number]  Primary Care Physician:  Provider, No Known  Date of admission: 3/25/2024    Subjective     Interval History:   Follow-up acute kidney injury on CKD 2.  ENT evaluation noted.  Symptomatic treatment .  Making some noise vocally this morning but his voice is still extremely weak.  Review of Systems:   As noted above    Objective     Vitals:   Temp:  [97.9 °F (36.6 °C)-98.8 °F (37.1 °C)] 97.9 °F (36.6 °C)  Heart Rate:  [75-82] 75  Resp:  [18] 18  BP: (110-119)/(55-70) 119/62    Intake/Output Summary (Last 24 hours) at 4/3/2024 1056  Last data filed at 4/3/2024 0908  Gross per 24 hour   Intake 2468 ml   Output 1600 ml   Net 868 ml       Physical Exam:    General Appearance: Cachectic.  Able to make  little vocal noises but not truly able to understand his voice.    Skin: warm and dry  HEENT: Oral mucosa more moist.  Edentulous.  Wearing dark glasses.  Neck: supple, no JVD  Lungs: Clear to auscultation bilaterally.  Unlabored on room air.  No wheezing.  Heart: RRR, normal S1 and S2  Abdomen: soft, nontender, distended. +bs. PEG  : Mariscal catheter  Extremities: No edema.  Poor muscle mass.      Scheduled Meds:     bisacodyl, 10 mg, Rectal, Once  enoxaparin, 40 mg, Subcutaneous, Q24H  lansoprazole, 30 mg, Per G Tube, Q AM  Menthol-Zinc Oxide, 1 Application, Topical, Q12H  polyethylene glycol, 17 g, Per G Tube, Daily  potassium chloride, 40 mEq, Oral, Q4H  senna-docusate sodium, 2 tablet, Per G Tube, Daily  terazosin, 1 mg, Per G Tube, Nightly      IV Meds:          Results Reviewed:   I have personally reviewed the results from the time of this admission to 4/3/2024 10:56 EDT     Results from last 7 days   Lab Units 24  0406 24  1319 24  0320 24  1955 24  0355 24  0305 24  0345   SODIUM mmol/L 144  --  146*  --  149*   < > 151*   POTASSIUM mmol/L 3.6 4.3 3.4*    < > 3.4*   < > 3.4*   CHLORIDE mmol/L 112*  --  113*  --  115*   < > 118*   CO2 mmol/L 26.0  --  25.3  --  26.1   < > 23.1   BUN mg/dL 34*  --  33*  --  33*   < > 50*   CREATININE mg/dL 0.97  --  0.99  --  0.97   < > 1.69*   CALCIUM mg/dL 8.2*  --  8.5*  --  8.1*   < > 8.5*   BILIRUBIN mg/dL  --   --   --   --   --   --  0.7   ALK PHOS U/L  --   --   --   --   --   --  84   ALT (SGPT) U/L  --   --   --   --   --   --  10   AST (SGOT) U/L  --   --   --   --   --   --  25   GLUCOSE mg/dL 111*  --  124*  --  102*   < > 126*    < > = values in this interval not displayed.       Estimated Creatinine Clearance: 69.9 mL/min (by C-G formula based on SCr of 0.97 mg/dL).    Results from last 7 days   Lab Units 04/03/24  0406 04/02/24  0320 04/01/24  0355   MAGNESIUM mg/dL 1.7 1.9 1.9   PHOSPHORUS mg/dL 3.0 2.3* 2.2*             Results from last 7 days   Lab Units 04/02/24  0320 04/01/24  0355 03/31/24  0349 03/30/24  0305 03/29/24  0345   WBC 10*3/mm3 7.05 7.75 10.93* 10.45 11.18*   HEMOGLOBIN g/dL 10.1* 10.3* 11.1* 11.1* 11.4*   PLATELETS 10*3/mm3 327 383 422 467* 481*             Assessment / Plan     ASSESSMENT:  Acute kidney injury on CKD 2 with very poor muscle mass,  baseline creatinine 0.8-1.  MARTITA likely due to obstruction, volume depletion.  Creatinine has improved with IV fluids and placement of Mariscal catheter.  Potassium and phosphorus replete.  Hypernatremia due to free water deficit has improved with increase in water per PEG.   2.  E. coli urinary tract infection on Zosyn.  3.  BPH.  Mariscal catheter in place.  On Proscar and Flomax.  4.  Failure to thrive.  .  Status post PEG 3/29/2024.  5.  Anemia.  Hemoglobin stable.  6. Pharyngeal exudate with no strep on screen or swab.  PLAN:  Renal will sign off.  Please call with further questions.  Thank you for involving us in the care of Anthony Gallegos.  Please feel free to call with any questions.    Tosha Cummings MD  04/03/24  10:56 EDT    Nephrology  [facility]  [phone]    Please note that portions of this note were completed with a voice recognition program. Copied portions of note reviewed and accurate as of 4/3/24.

## 2024-04-04 LAB
ALBUMIN SERPL-MCNC: 2.3 G/DL (ref 3.5–5.2)
ANION GAP SERPL CALCULATED.3IONS-SCNC: 4.8 MMOL/L (ref 5–15)
BACTERIA SPEC AEROBE CULT: NORMAL
BUN SERPL-MCNC: 28 MG/DL (ref 8–23)
BUN/CREAT SERPL: 40.6 (ref 7–25)
CALCIUM SPEC-SCNC: 8.6 MG/DL (ref 8.6–10.5)
CHLORIDE SERPL-SCNC: 110 MMOL/L (ref 98–107)
CO2 SERPL-SCNC: 26.2 MMOL/L (ref 22–29)
CREAT SERPL-MCNC: 0.69 MG/DL (ref 0.76–1.27)
EGFRCR SERPLBLD CKD-EPI 2021: 93.6 ML/MIN/1.73
GLUCOSE BLDC GLUCOMTR-MCNC: 102 MG/DL (ref 70–130)
GLUCOSE BLDC GLUCOMTR-MCNC: 69 MG/DL (ref 70–130)
GLUCOSE BLDC GLUCOMTR-MCNC: 71 MG/DL (ref 70–130)
GLUCOSE BLDC GLUCOMTR-MCNC: 89 MG/DL (ref 70–130)
GLUCOSE SERPL-MCNC: 116 MG/DL (ref 65–99)
MAGNESIUM SERPL-MCNC: 1.7 MG/DL (ref 1.6–2.4)
PHOSPHATE SERPL-MCNC: 2.5 MG/DL (ref 2.5–4.5)
POTASSIUM SERPL-SCNC: 3.6 MMOL/L (ref 3.5–5.2)
POTASSIUM SERPL-SCNC: 4.3 MMOL/L (ref 3.5–5.2)
SODIUM SERPL-SCNC: 141 MMOL/L (ref 136–145)

## 2024-04-04 PROCEDURE — 97112 NEUROMUSCULAR REEDUCATION: CPT

## 2024-04-04 PROCEDURE — 83735 ASSAY OF MAGNESIUM: CPT | Performed by: SURGERY

## 2024-04-04 PROCEDURE — 84132 ASSAY OF SERUM POTASSIUM: CPT | Performed by: STUDENT IN AN ORGANIZED HEALTH CARE EDUCATION/TRAINING PROGRAM

## 2024-04-04 PROCEDURE — 97530 THERAPEUTIC ACTIVITIES: CPT

## 2024-04-04 PROCEDURE — 80069 RENAL FUNCTION PANEL: CPT | Performed by: INTERNAL MEDICINE

## 2024-04-04 PROCEDURE — 25010000002 ENOXAPARIN PER 10 MG: Performed by: STUDENT IN AN ORGANIZED HEALTH CARE EDUCATION/TRAINING PROGRAM

## 2024-04-04 PROCEDURE — 97110 THERAPEUTIC EXERCISES: CPT

## 2024-04-04 PROCEDURE — 82948 REAGENT STRIP/BLOOD GLUCOSE: CPT

## 2024-04-04 RX ORDER — POTASSIUM CHLORIDE 1.5 G/1.58G
40 POWDER, FOR SOLUTION ORAL EVERY 4 HOURS
Status: COMPLETED | OUTPATIENT
Start: 2024-04-04 | End: 2024-04-04

## 2024-04-04 RX ADMIN — TERAZOSIN HYDROCHLORIDE 1 MG: 1 CAPSULE ORAL at 20:02

## 2024-04-04 RX ADMIN — ANORECTAL OINTMENT 1 APPLICATION: 15.7; .44; 24; 20.6 OINTMENT TOPICAL at 09:19

## 2024-04-04 RX ADMIN — DEXTROSE MONOHYDRATE 25 G: 25 INJECTION, SOLUTION INTRAVENOUS at 12:21

## 2024-04-04 RX ADMIN — POLYETHYLENE GLYCOL 3350 17 G: 17 POWDER, FOR SOLUTION ORAL at 09:18

## 2024-04-04 RX ADMIN — POTASSIUM CHLORIDE 40 MEQ: 1.5 POWDER, FOR SOLUTION ORAL at 09:18

## 2024-04-04 RX ADMIN — POTASSIUM CHLORIDE 40 MEQ: 1.5 POWDER, FOR SOLUTION ORAL at 12:21

## 2024-04-04 RX ADMIN — ENOXAPARIN SODIUM 40 MG: 100 INJECTION SUBCUTANEOUS at 19:06

## 2024-04-04 RX ADMIN — DOCUSATE SODIUM 50MG AND SENNOSIDES 8.6MG 2 TABLET: 8.6; 5 TABLET, FILM COATED ORAL at 09:18

## 2024-04-04 RX ADMIN — ANORECTAL OINTMENT 1 APPLICATION: 15.7; .44; 24; 20.6 OINTMENT TOPICAL at 20:06

## 2024-04-04 RX ADMIN — LANSOPRAZOLE 30 MG: 15 TABLET, ORALLY DISINTEGRATING ORAL at 06:17

## 2024-04-04 NOTE — PROGRESS NOTES
Discharge Planning Assessment  [facility]     Patient Name: Anthony Gallegos  MRN: [id number]  Today's Date: 4/4/2024    Admit Date: 3/25/2024    Plan: [facility], skilled bed pending pre-cert then LTC medicaid bed pending. VIRI Prabhakar RN, CCP.   Discharge Needs Assessment    No documentation.                  Discharge Plan       Row Name 04/04/24 1535       Plan    Plan [facility], skilled bed pending pre-cert then LTC medicaid bed pending. VIRI Prabhakar RN, CCP.    Plan Comments Received call from Maddie Bandar and she states they can accept pending pre-cert. Sent request for pre-cert. Maddie will follow up with the patient's daughter regarding the medicaid pending bed and review with her what the next step will be. Spoke with the daughter and she is agreeable to the discharge plans. The patient will need ambulance for transportation. VIRI Prabhakar Rn, CCP.      Row Name 04/04/24 5334       Plan    Plan Comments Called Maddie Portillo and left her a secure voice message to verify that she can accept to [facility], medicaid pending bed. CCP will continue to follow. VIRI Prabhakar RN, CCP.                  Continued Care and Services - Admitted Since 3/25/2024       Destination       Service Provider Request Status Selected Services Address Phone Fax Patient Preferred    [facility] Accepted N/A [address] [address] [phone] --    [facility] Declined  Not eligible N/A [address] [facility] [address] [phone] --    [facility] Declined N/A [address] [facility] [place] [facility] [address] [phone] --    [facility] Declined N/A [address] [place] [address] [phone] --    [facility] Declined N/A [address] [facility] [address] [address] [phone] --              Home Medical Care       Service Provider  Request Status Selected Services Address Phone Fax Patient Preferred    [facility] Declined  Insurance Denial N/A [address] [id number] [phone] --                  Selected Continued Care - Prior Encounters Includes continued care and service providers with selected services from prior encounters from 12/26/2023 to 4/4/2024      Discharged on 1/31/2024 Admission date: 1/21/2024 - Discharge disposition: Skilled Nursing Facility (DC - External)      Destination       Service Provider Selected Services Address Phone Fax Patient Preferred    [facility] [address] [address] [phone] --                      Discharged on 1/8/2024 Admission date: 12/20/2023 - Discharge disposition: Skilled Nursing Facility (DC - External)      Destination       Service Provider Selected Services Address Phone Fax Patient Preferred    [facility] [address] [facility] [address] [phone] --                          Expected Discharge Date and Time       Expected Discharge Date Expected Discharge Time    Apr 5, 2024            Demographic Summary    No documentation.                  Functional Status    No documentation.                  Psychosocial    No documentation.                  Abuse/Neglect    No documentation.                  Legal    No documentation.                  Substance Abuse    No documentation.                  Patient Forms    No documentation.                     Delaney Prabhakar RN

## 2024-04-04 NOTE — PLAN OF CARE
Goal Outcome Evaluation:  Plan of Care Reviewed With: patient        Progress: improving  Outcome Evaluation: pt seen for OT/PT this date due to poor activity tolerance. Pt with noted improvements with upright act tolerance and OOB activity. Required max/depx2 for bed mobility, however able to sit at EOB for increased time with SBA/CGA engaging in bilat elbow ROM and func reaching unsupported. he demo'd x2 STS with mod Ax2 with rwx support, R knee pain limiting however able to initiate x1 lateral step with RLE. Remains quick to fatigue. He will continue to benefit from skilled OT to address deficits and progress toward goals. rec SNF      Anticipated Discharge Disposition (OT): skilled nursing facility

## 2024-04-04 NOTE — PROGRESS NOTES
"    Name: Anthony Gallegos ADMIT: 3/25/2024   : 1945  PCP: Provider, No Known    MRN: [id number] LOS: 10 days   AGE/SEX: 79 y.o. male  ROOM: [facility]/     Subjective   Subjective     No events overnight. I'm able to understand him today. His speech is much more fluent and clear. He has no complaints \"so far\" today.       Objective   Objective   Vital Signs  Temp:  [97.1 °F (36.2 °C)-98.3 °F (36.8 °C)] 97.1 °F (36.2 °C)  Heart Rate:  [70-76] 75  Resp:  [16] 16  BP: (106-115)/(55-67) 114/67  SpO2:  [98 %-100 %] 99 %  on   ;   Device (Oxygen Therapy): room air  Body mass index is 22.43 kg/m².  Physical Exam  Constitutional:       General: He is not in acute distress.     Appearance: He is ill-appearing. He is not toxic-appearing.   Cardiovascular:      Rate and Rhythm: Normal rate and regular rhythm.      Heart sounds: Normal heart sounds.   Pulmonary:      Effort: Pulmonary effort is normal.      Breath sounds: Normal breath sounds.   Abdominal:      General: Bowel sounds are normal.      Palpations: Abdomen is soft.      Comments: PEG, abdominal binder   Musculoskeletal:         General: No tenderness.      Right lower leg: No edema.      Left lower leg: No edema.   Neurological:      General: No focal deficit present.      Mental Status: He is alert and oriented to person, place, and time.      Comments: Largely nonverbal, but follows commands    Psychiatric:         Mood and Affect: Mood normal.         Behavior: Behavior normal.         Results Review     I reviewed the patient's new clinical results.  Results from last 7 days   Lab Units 24  0320 24  0355 24  0349 24  0305   WBC 10*3/mm3 7.05 7.75 10.93* 10.45   HEMOGLOBIN g/dL 10.1* 10.3* 11.1* 11.1*   PLATELETS 10*3/mm3 327 383 422 467*     Results from last 7 days   Lab Units 24  0529 24  1623 24  0406 24  1319 24  0320 04/0124  0355   SODIUM mmol/L 141  --  144  --  146*  --  149* "   POTASSIUM mmol/L 3.6 3.8 3.6 4.3 3.4*   < > 3.4*   CHLORIDE mmol/L 110*  --  112*  --  113*  --  115*   CO2 mmol/L 26.2  --  26.0  --  25.3  --  26.1   BUN mg/dL 28*  --  34*  --  33*  --  33*   CREATININE mg/dL 0.69*  --  0.97  --  0.99  --  0.97   GLUCOSE mg/dL 116*  --  111*  --  124*  --  102*    < > = values in this interval not displayed.   Estimated Creatinine Clearance: 98.3 mL/min (A) (by C-G formula based on SCr of 0.69 mg/dL (L)).  Results from last 7 days   Lab Units 04/04/24 0529 04/03/24 0406 04/02/24 0320 04/01/24  0355 03/30/24  0305 03/29/24  0345   ALBUMIN g/dL 2.3* 2.3* 2.3* 2.4*   < > 2.5*   BILIRUBIN mg/dL  --   --   --   --   --  0.7   ALK PHOS U/L  --   --   --   --   --  84   AST (SGOT) U/L  --   --   --   --   --  25   ALT (SGPT) U/L  --   --   --   --   --  10    < > = values in this interval not displayed.     Results from last 7 days   Lab Units 04/04/24 0529 04/03/24 0406 04/02/24 0320 04/01/24  0355   CALCIUM mg/dL 8.6 8.2* 8.5* 8.1*   ALBUMIN g/dL 2.3* 2.3* 2.3* 2.4*   MAGNESIUM mg/dL 1.7 1.7 1.9 1.9   PHOSPHORUS mg/dL 2.5 3.0 2.3* 2.2*           COVID19   Date Value Ref Range Status   03/25/2024 Not Detected Not Detected - Ref. Range Final     Glucose   Date/Time Value Ref Range Status   04/04/2024 1205 69 (L) 70 - 130 mg/dL Final   04/04/2024 0628 102 70 - 130 mg/dL Final   04/04/2024 0012 89 70 - 130 mg/dL Final   04/03/2024 1842 115 70 - 130 mg/dL Final   04/03/2024 1754 54 (L) 70 - 130 mg/dL Final   04/03/2024 0724 101 70 - 130 mg/dL Final   04/03/2024 0635 86 70 - 130 mg/dL Final       MRI Brain Without Contrast  Narrative: BRAIN MRI WITHOUT CONTRAST     HISTORY: Mental status change, unknown cause; R63.8-Other symptoms and  signs concerning food and fluid intake; N17.9-Acute kidney failure,  unspecified; R52-Pain, unspecified; R13.10-Dysphagia, unspecified;  E86.0-Dehydration; R79.89-Other specified abnormal findings of blood  chemistry; R62.7-Adult failure to thrive;  R53.1-Weakness; R63.4-Abnormal  weight loss; N39.0-Urinary tract infection, site not specifi     COMPARISON: March 25, 2024.     FINDINGS:  Multiplanar images of the head were obtained without  gadolinium. No areas of restricted diffusion are seen to suggest acute  infarct. There is atrophy. There is extensive periventricular and deep  white matter microangiopathic change. There is no midline shift or mass  effect. Intracranial flow voids appear intact. Old lacunar infarcts are  noted within the left basal ganglia. No abnormality is seen on gradient  echo imaging. There may be some mucosal thickening within the ethmoid  sinuses.     Impression: 1. No acute intracranial abnormality.     This report was finalized on 3/29/2024 9:26 PM by Dr. Kirsten Peña M.D on Workstation: [id number]       Scheduled Medications  bisacodyl, 10 mg, Rectal, Once  enoxaparin, 40 mg, Subcutaneous, Q24H  lansoprazole, 30 mg, Per G Tube, Q AM  Menthol-Zinc Oxide, 1 Application, Topical, Q12H  polyethylene glycol, 17 g, Per G Tube, Daily  senna-docusate sodium, 2 tablet, Per G Tube, Daily  terazosin, 1 mg, Per G Tube, Nightly    Infusions   Diet  NPO Diet NPO Type: Strict NPO       Assessment/Plan     Active Hospital Problems    Diagnosis  POA   • **UTI (urinary tract infection) [N39.0]  Yes   • Encephalopathy [G93.40]  Yes   • Decreased oral intake [R63.8]  Yes   • Dysphagia [R13.10]  Yes   • MARTITA (acute kidney injury) [N17.9]  Yes   • Severe malnutrition [E43]  Yes   • Hypertension [I10]  Yes      Resolved Hospital Problems   No resolved problems to display.       79 y.o. male admitted with UTI (urinary tract infection).    Patient is a 78 y.o. male with a history of HTN, BPH pyelonephritis/UTI, who presented to [facility] from facility for failure to thrive, not eating or drinking much, weight loss, decreased mobility.     ESBL UTI  -completed a course of ertapenem at the direction of ID    Gross hematuria/acute  urinary retention  -felt to be secondary to UTI  -hematuria is resolved  -a1 blocker  -voiding trial today     MARTITA/bilateral hydronephrosis  -Creatinine 2.5 on admission, up from 0.66 on 01/29/2024  -CT scan showed mild-to-moderate bilateral hydronephrosis   -Status post Mariscal catheter placement 03/26/2024 per urology with resolution of MARTITA    Hypernatremia  -improved with increased free water flushes     Encephalopathy  -Likely secondary to UTI/dehydration/malnutrition  -No history of dementia per daughter, at baseline oriented x 3  -CT head with no acute findings  -Follow-up MRI brain 03/29/2024 showed old stroke but no acute findings  -resolved      Failure to thrive/malnutrition/decreased p.o. intake  -speech therapy evaluated, underwent VFSS 03/28/2024, found to have diffuse to be profound generalized pharyngeal weakness, silent aspiration.  Strict n.p.o. recommended  -Palliative care evaluated for goals of care discussion, family wanted to proceed with PEG tube placement.  -Status post PEG tube placement 03/29  -Continue tube feeds, tolerating     Constipation  -CT scan showed moderate gaseous distention of the colon with moderate amount of stool in the right colon and rectum.  -Patient refused suppository previously  -Status post Fleet enema 03/29, small BM afterwards  -Had multiple bowel movements overnight 03/30-03/31     Hypertension  -well controlled     Anemia of chronic disease  -Hemoglobin 10.1 on last check    GERD  -ppi    Pharyngeal secretions  -rapid strep is negative, culture negative  -ENT thinks this is non-infectious and recommends oral care  -chloraseptic spray prn    Lovenox 40 mg SC daily for DVT prophylaxis.  Full code.  Discussed with patient and nursing staff.  Anticipate discharge to SNU facility once arrangements have been made.      Gerson Townsend MD  [facility]  04/04/24  13:46 EDT    I wore protective equipment throughout this patient encounter including a face  mask, gloves and protective eyewear.  Hand hygiene was performed before donning protective equipment and after removal when leaving the room.

## 2024-04-04 NOTE — THERAPY TREATMENT NOTE
Patient Name: Anthony Gallegos  : 1945    MRN: [id number]                              Today's Date: 2024       Admit Date: 3/25/2024    Visit Dx:     ICD-10-CM ICD-9-CM   1. Decreased oral intake  R63.8 783.9   2. MARTITA (acute kidney injury)  N17.9 584.9   3. Whole body pain  R52 780.96   4. Dysphagia, unspecified type  R13.10 787.20   5. Dehydration  E86.0 276.51   6. Elevated troponin  R79.89 790.6   7. Failure to thrive in adult  R62.7 783.7   8. Generalized weakness  R53.1 780.79   9. Weight loss  R63.4 783.21   10. Urinary tract infection in male  N39.0 599.0     Patient Active Problem List   Diagnosis    Ankylosing spondylitis of cervical region    Recent unexplained weight loss    Abnormal serum lipase level    Abnormal serum level of amylase    Hypertension    Boil    Immobility    Fall from bed    Tetrahydrocannabinol (THC) use disorder, mild, abuse    Generalized pain        Grief    DISH (disseminated idiopathic skeletal hyperostosis)    Generalized weakness    Severe malnutrition    Altered mental status    Transient alteration of awareness    GERD without esophagitis    BPH (benign prostatic hyperplasia)    UTI (urinary tract infection)    Dehydration    MARTITA (acute kidney injury)    Hyperglycemia    Encephalopathy    Decreased oral intake    Dysphagia     Past Medical History:   Diagnosis Date    Abscess of scrotum     MARTITA (acute kidney injury)     Altered mental state     Arthritis     AS (ankylosing spondylitis)     History of MRSA infection     Hypertension     Leukocytosis     Tetrahydrocannabinol (THC) use disorder, mild, abuse 2023    Uveitis      Past Surgical History:   Procedure Laterality Date    COLONOSCOPY      ENDOSCOPY W/ PEG TUBE PLACEMENT N/A 3/29/2024    Procedure: ESOPHAGOGASTRODUODENOSCOPY WITH PERCUTANEOUS ENDOSCOPIC GASTROSTOMY TUBE INSERTION;  Surgeon: Khadar Broderick MD;  Location: [facility] ENDOSCOPY;  Service: General;  Laterality: N/A;  PRE/POST -  DYSPHAGIA    ROTATOR CUFF REPAIR Right     TOTAL HIP ARTHROPLASTY Left 2014      General Information       Row Name 04/04/24 1227          OT Time and Intention    Document Type therapy note (daily note)  -MW     Mode of Treatment co-treatment;physical therapy;occupational therapy  poor activity tolerance  -       Row Name 04/04/24 1227          General Information    Patient Profile Reviewed yes  -MW     Existing Precautions/Restrictions fall  -       Row Name 04/04/24 1227          Cognition    Orientation Status (Cognition) oriented to;person  communicated with mainly hand gestures  -       Row Name 04/04/24 1227          Safety Issues, Functional Mobility    Impairments Affecting Function (Mobility) balance;endurance/activity tolerance;strength;range of motion (ROM);pain  -     Comment, Safety Issues/Impairments (Mobility) Co-treatment medically necessary and appropriate d/t pt's acuity level, decreased endurance and activity tolerance, and safety of patient and staff. Treatment focused on progression of care and goals established in POC. Gait belt and non-skid socks worn.  -               User Key  (r) = Recorded By, (t) = Taken By, (c) = Cosigned By      Initials Name Provider Type    MW Mindy Diallo, STUART Occupational Therapist                     Mobility/ADL's       Row Name 04/04/24 1228          Bed Mobility    Bed Mobility supine-sit;sit-supine  -MW     Supine-Sit [place] (Bed Mobility) maximum assist (25% patient effort);2 person assist  -MW     Sit-Supine [place] (Bed Mobility) maximum assist (25% patient effort);dependent (less than 25% patient effort);2 person assist  -MW     Assistive Device (Bed Mobility) bed rails;head of bed elevated;draw sheet  -     Comment, (Bed Mobility) bed not working properly, RN aware- pt able to sit with SBA/CGA throughout EOB activity AROM/func reaching  -       Row Name 04/04/24 1228          Transfers    Transfers sit-stand transfer  -        [facility] Name 04/04/24 1228          Sit-Stand Transfer    Sit-Stand [place] (Transfers) moderate assist (50% patient effort);2 person assist;verbal cues;nonverbal cues (demo/gesture)  -     Assistive Device (Sit-Stand Transfers) walker, front-wheeled  -     Comment, (Sit-Stand Transfer) able to iniaite x1 R lateral step and weight shift, remains very quick to fatigue with static standing activity  -[facility] Name 04/04/24 1228          Functional Mobility    Functional Mobility- Ind. Level unable to perform  -[facility] Name 04/04/24 1228          Activities of Daily Living    BADL Assessment/Intervention lower body dressing;toileting  -[facility] Name 04/04/24 1228          Toileting Assessment/Training    [place] Level (Toileting) toileting skills;dependent (less than 25% patient effort)  -     Comment, (Toileting) not approp for [facility] this date  -[facility] Name 04/04/24 1228          Lower Body Dressing Assessment/Training    [place] Level (Lower Body Dressing) lower body dressing skills;dependent (less than 25% patient effort);socks  -               User Key  (r) = Recorded By, (t) = Taken By, (c) = Cosigned By      Initials Name Provider Type     Mindy Diallo OT Occupational Therapist                   Obj/Interventions       [facility] Name 04/04/24 1229          Elbow/Forearm (Therapeutic Exercise)    Elbow/Forearm (Therapeutic Exercise) AROM (active range of motion)  -     Elbow/Forearm AROM (Therapeutic Exercise) bilateral;flexion;extension;10 repetitions;sitting  -[facility] Name 04/04/24 1229          Motor Skills    Motor Skills functional endurance  -     Functional Endurance quick to fatigue  -     Therapeutic Exercise elbow/forearm  -MW       Row Name 04/04/24 1229          Balance    Balance Assessment sitting static balance;sitting dynamic balance;sit to stand dynamic balance;standing static balance  -     Static Sitting Balance standby assist  -      Dynamic Sitting Balance standby assist;contact guard  -     Position, Sitting Balance sitting edge of bed  -     Sit to Stand Dynamic Balance moderate assist;2-person assist  -     Static Standing Balance moderate assist;minimal assist;2-person assist  -     Position/Device Used, Standing Balance supported;walker, front-wheeled  -     Balance Interventions UE activity with balance activity  -     Comment, Balance func reaching with BUE, AROM in bilat shoulder very limited, able to reach across midline with min A for sit balance  -               User Key  (r) = Recorded By, (t) = Taken By, (c) = Cosigned By      Initials Name Provider Type    Mindy Miranda, OT Occupational Therapist                   Goals/Plan    No documentation.                  Clinical Impression       [facility] Name 04/04/24 1231          Pain Assessment    Pre/Posttreatment Pain Comment R knee with standing, did not rate  -[facility] Name 04/04/24 1231          Plan of Care Review    Plan of Care Reviewed With patient  -     Progress improving  -     Outcome Evaluation pt seen for OT/PT this date due to poor activity tolerance. Pt with noted improvements with upright act tolerance and OOB activity. Required max/depx2 for bed mobility, however able to sit at EOB for increased time with SBA/CGA engaging in bilat elbow ROM and func reaching unsupported. he demo'd x2 STS with mod Ax2 with rwx support, R knee pain limiting however able to initiate x1 lateral step with RLE. Remains quick to fatigue. He will continue to benefit from skilled OT to address deficits and progress toward goals. rec SNF  -       Row Name 04/04/24 1231          Therapy Plan Review/Discharge Plan (OT)    Anticipated Discharge Disposition (OT) skilled nursing facility  -       Row Name 04/04/24 1231          Vital Signs    O2 Delivery Pre Treatment room air  -       Row Name 04/04/24 1231          Positioning and Restraints    Pre-Treatment Position  in bed  -MW     Post Treatment Position bed  -MW     In Bed notified nsg;fowlers;call light within reach;encouraged to call for assist;side rails up x3  -MW               User Key  (r) = Recorded By, (t) = Taken By, (c) = Cosigned By      Initials Name Provider Type    Mindy Miranda OT Occupational Therapist                   Outcome Measures       Row Name 04/04/24 1233          How much help from another is currently needed...    Putting on and taking off regular lower body clothing? 1  -MW     Bathing (including washing, rinsing, and drying) 1  -MW     Toileting (which includes using toilet bed pan or urinal) 1  -MW     Putting on and taking off regular upper body clothing 2  -MW     Taking care of personal grooming (such as brushing teeth) 3  -MW     Eating meals 1  PEG  -MW     AM-PAC 6 Clicks Score (OT) 9  -MW       Row Name 04/04/24 0920          How much help from another person do you currently need...    Turning from your back to your side while in flat bed without using bedrails? 2  -EB     Moving from lying on back to sitting on the side of a flat bed without bedrails? 2  -EB     Moving to and from a bed to a chair (including a wheelchair)? 1  -EB     Standing up from a chair using your arms (e.g., wheelchair, bedside chair)? 1  -EB     Climbing 3-5 steps with a railing? 1  -EB     To walk in hospital room? 1  -EB     AM-PAC 6 Clicks Score (PT) 8  -EB     Highest Level of Mobility Goal 3 --> Sit at edge of bed  -EB       Row Name 04/04/24 1233          Functional Assessment    Outcome Measure Options AM-PAC 6 Clicks Daily Activity (OT)  -MW               User Key  (r) = Recorded By, (t) = Taken By, (c) = Cosigned By      Initials Name Provider Type    Zabrina Roca RN Registered Nurse    Mindy Miranda OT Occupational Therapist                    Occupational Therapy Education       Title: PT OT SLP Therapies (In Progress)       Topic: Occupational Therapy (In Progress)       Point:  ADL training (Done)       Description:   Instruct learner(s) on proper safety adaptation and remediation techniques during self care or transfers.   Instruct in proper use of assistive devices.                  Learning Progress Summary             Patient Acceptance, E, VU,NR by PP at 3/31/2024 1008    Comment: Pt Ed on OT role, d/c planning and call light function. Difficult to assess understanding.                         Point: Home exercise program (Not Started)       Description:   Instruct learner(s) on appropriate technique for monitoring, assisting and/or progressing therapeutic exercises/activities.                  Learner Progress:  Not documented in this visit.              Point: Precautions (Not Started)       Description:   Instruct learner(s) on prescribed precautions during self-care and functional transfers.                  Learner Progress:  Not documented in this visit.              Point: Body mechanics (Not Started)       Description:   Instruct learner(s) on proper positioning and spine alignment during self-care, functional mobility activities and/or exercises.                  Learner Progress:  Not documented in this visit.                              User Key       Initials Effective Dates Name Provider Type Discipline     06/09/23 -  Tracy Vega OT Occupational Therapist OT                  OT Recommendation and Plan     Plan of Care Review  Plan of Care Reviewed With: patient  Progress: improving  Outcome Evaluation: pt seen for OT/PT this date due to poor activity tolerance. Pt with noted improvements with upright act tolerance and OOB activity. Required max/depx2 for bed mobility, however able to sit at EOB for increased time with SBA/CGA engaging in bilat elbow ROM and func reaching unsupported. he demo'd x2 STS with mod Ax2 with rwx support, R knee pain limiting however able to initiate x1 lateral step with RLE. Remains quick to fatigue. He will continue to benefit from  skilled OT to address deficits and progress toward goals. rec SNF     Time Calculation:         Time Calculation- OT       Row Name 04/04/24 1234             Time Calculation- OT    OT Start Time 1039  -MW      OT Stop Time 1102  -MW      OT Time Calculation (min) 23 min  -MW      Total Timed Code Minutes- OT 23 minute(s)  -MW      OT Received On 04/04/24  -MW      OT - Next Appointment 04/08/24  -MW         Timed Charges    40935 -  OT Neuromuscular Reeducation Minutes 8  -MW      80460 - OT Therapeutic Activity Minutes 15  -MW         Total Minutes    Timed Charges Total Minutes 23  -MW       Total Minutes 23  -MW                User Key  (r) = Recorded By, (t) = Taken By, (c) = Cosigned By      Initials Name Provider Type     Mindy Diallo OT Occupational Therapist                  Therapy Charges for Today       Code Description Service Date Service Provider Modifiers Qty    20228190695 HC OT NEUROMUSC RE EDUCATION EA 15 MIN 4/4/2024 Mindy Diallo OT GO 1    00380125230 HC OT THERAPEUTIC ACT EA 15 MIN 4/4/2024 Mindy Diallo OT GO 1                 Mindy Diallo OT  4/4/2024

## 2024-04-04 NOTE — PROGRESS NOTES
Discharge Planning Assessment  [facility]     Patient Name: Anthony Gallegos  MRN: [id number]  Today's Date: 4/4/2024    Admit Date: 3/25/2024    Plan: DC to medicaid pending bed, will need ambulance transportation   Discharge Needs Assessment    No documentation.                  Discharge Plan       Row Name 04/04/24 9099       Plan    Plan Comments Called Maddie Portillo and left her a secure voice message to verify that she can accept to [place], medicaid pending bed. CCP will continue to follow. VIRI Prabhakar RN, CCP.                  Continued Care and Services - Admitted Since 3/25/2024       Destination       Service Provider Request Status Selected Services Address Phone Fax Patient Preferred    [facility] Considering N/A [address] [address] [phone] --    [facility] Declined  Not eligible N/A [address] [facility] [address] [phone] --    [facility] Declined N/A [address] [facility] [facility] [id number] [phone] --    [facility] Declined N/A [address] [facility] [address] [phone] --    [facility] Declined N/A [address] [address] [phone] --              Home Medical Care       Service Provider Request Status Selected Services Address Phone Fax Patient Preferred    [facility] Declined  Insurance Denial N/A [address] [facility] [address] [phone] --                  Selected Continued Care - Prior Encounters Includes continued care and service providers with selected services from prior encounters from 12/26/2023 to 4/4/2024      Discharged on 1/31/2024 Admission date: 1/21/2024 - Discharge disposition: Skilled Nursing Facility (DC - External)      Destination       Service Provider Selected Services Address Phone Fax  Patient Preferred    [facility] [address] [facility] [address] [facility] [address] [phone] --                      Discharged on 1/8/2024 Admission date: 12/20/2023 - Discharge disposition: Skilled Nursing Facility (DC - External)      Destination       Service Provider Selected Services Address Phone Fax Patient Preferred    [facility] [address] [facility] [address] [phone] --                          Expected Discharge Date and Time       Expected Discharge Date Expected Discharge Time    Apr 5, 2024            Demographic Summary    No documentation.                  Functional Status    No documentation.                  Psychosocial    No documentation.                  Abuse/Neglect    No documentation.                  Legal    No documentation.                  Substance Abuse    No documentation.                  Patient Forms    No documentation.                     Delaney Prabhakar RN

## 2024-04-04 NOTE — SIGNIFICANT NOTE
04/04/24 1638   Post Acute Pre-Cert Documentation   Request Submitted by Facility - Type: Hospital   Post-Acute Authorization Type Submitted: SNF   Date Post Acute Pre-Cert Inititated per Facility 04/04/24   Accepting Facility [facility] Discharge Date Requested 04/05/24   All Clinicals Submitted? Yes   Had Accepting Facility at Time of Submission Yes   Response Communicated to: ;Accepting Facility Liaison   Authorization Number: PENDING [id number]   Post Acute Pre-Cert Initiated Comment ALIREZA REECE

## 2024-04-04 NOTE — PLAN OF CARE
Goal Outcome Evaluation:  Plan of Care Reviewed With: patient        Progress: no change  Outcome Evaluation: Pt is alert and oriented x3. Pts voice is very hoarse but he is able to make some needs known. Recieved orders to perform voiding trial. Pt was unable to void on his own. Bladder scan and I&O cath completed. MD notified of this. RN made MD aware that the patient may not be able to make it known when he has to void. MD stated to place forde next time. No complaints of pain throughout shift. VSS. RA. NPO. Novasoure Renal going at 50ml/hr with 30ml flush. Will cont plan of care.

## 2024-04-04 NOTE — PLAN OF CARE
Goal Outcome Evaluation:           Progress: no change  Outcome Evaluation: VSS. Patient is A&O x 3 with confusion at times. Patient has a specialty bed. Patient is receiving Novosource via peg tube at 50mL/hr with 300mL water flush every 4 hours. Patient is NPO. Patient has a weak voice, but is able to speak with a whisper. Mariscal catheter in place with catheter care performed. Patient is on room air. Continue plan of care.

## 2024-04-04 NOTE — THERAPY TREATMENT NOTE
Patient Name: Anthony Gallegos  : 1945    MRN: [id number]                              Today's Date: 2024       Admit Date: 3/25/2024    Visit Dx:     ICD-10-CM ICD-9-CM   1. Decreased oral intake  R63.8 783.9   2. MARTITA (acute kidney injury)  N17.9 584.9   3. Whole body pain  R52 780.96   4. Dysphagia, unspecified type  R13.10 787.20   5. Dehydration  E86.0 276.51   6. Elevated troponin  R79.89 790.6   7. Failure to thrive in adult  R62.7 783.7   8. Generalized weakness  R53.1 780.79   9. Weight loss  R63.4 783.21   10. Urinary tract infection in male  N39.0 599.0     Patient Active Problem List   Diagnosis    Ankylosing spondylitis of cervical region    Recent unexplained weight loss    Abnormal serum lipase level    Abnormal serum level of amylase    Hypertension    Boil    Immobility    Fall from bed    Tetrahydrocannabinol (THC) use disorder, mild, abuse    Generalized pain        Grief    DISH (disseminated idiopathic skeletal hyperostosis)    Generalized weakness    Severe malnutrition    Altered mental status    Transient alteration of awareness    GERD without esophagitis    BPH (benign prostatic hyperplasia)    UTI (urinary tract infection)    Dehydration    MARTITA (acute kidney injury)    Hyperglycemia    Encephalopathy    Decreased oral intake    Dysphagia     Past Medical History:   Diagnosis Date    Abscess of scrotum     MARTITA (acute kidney injury)     Altered mental state     Arthritis     AS (ankylosing spondylitis)     History of MRSA infection     Hypertension     Leukocytosis     Tetrahydrocannabinol (THC) use disorder, mild, abuse 2023    Uveitis      Past Surgical History:   Procedure Laterality Date    COLONOSCOPY      ENDOSCOPY W/ PEG TUBE PLACEMENT N/A 3/29/2024    Procedure: ESOPHAGOGASTRODUODENOSCOPY WITH PERCUTANEOUS ENDOSCOPIC GASTROSTOMY TUBE INSERTION;  Surgeon: Khadar Broderick MD;  Location: [facility] ENDOSCOPY;  Service: General;  Laterality: N/A;  PRE/POST -  DYSPHAGIA    ROTATOR CUFF REPAIR Right     TOTAL HIP ARTHROPLASTY Left 2014      General Information       Row Name 04/04/24 1304          Physical Therapy Time and Intention    Document Type therapy note (daily note)  -PH     Mode of Treatment co-treatment;occupational therapy;physical therapy  -       Row Name 04/04/24 1304          General Information    Existing Precautions/Restrictions fall  -PH       Row Name 04/04/24 1304          Cognition    Orientation Status (Cognition) oriented to;person  -PH       Row Name 04/04/24 1304          Safety Issues, Functional Mobility    Impairments Affecting Function (Mobility) balance;endurance/activity tolerance;strength;range of motion (ROM);pain  -PH     Comment, Safety Issues/Impairments (Mobility) Co treatment medically appropriate and necessary due to patient acuity level, activity tolerance and safety of patient and staff. Treatment is focusing on progression of care and goals established in the POC. high level of fatigue and weakness warrantly co-tx  -PH               User Key  (r) = Recorded By, (t) = Taken By, (c) = Cosigned By      Initials Name Provider Type    PH Ivelisse Lance PTA Physical Therapist Assistant                   Mobility       Row Name 04/04/24 1305          Bed Mobility    Bed Mobility supine-sit;scooting/bridging;sit-supine  -PH     Scooting/Bridging [place] (Bed Mobility) dependent (less than 25% patient effort);2 person assist;verbal cues;nonverbal cues (demo/gesture)  -PH     Supine-Sit [place] (Bed Mobility) maximum assist (25% patient effort);2 person assist;verbal cues;nonverbal cues (demo/gesture)  -PH     Sit-Supine [place] (Bed Mobility) maximum assist (25% patient effort);2 person assist;verbal cues;nonverbal cues (demo/gesture)  -     Assistive Device (Bed Mobility) bed rails;head of bed elevated;draw sheet  -PH     Comment, (Bed Mobility) cues for initiation and sequencing; bed not operation w/ RN  notified; mattress deflated  -PH       Row Name 04/04/24 1305          Sit-Stand Transfer    Sit-Stand [place] (Transfers) moderate assist (50% patient effort);2 person assist;verbal cues;nonverbal cues (demo/gesture)  -PH     Assistive Device (Sit-Stand Transfers) walker, front-wheeled  -PH     Comment, (Sit-Stand Transfer) R knee pain reported by pt w/ B knees and hips in flexion w/ pt unable to correct w/ cues provided  -PH       Row Name 04/04/24 1305          Gait/Stairs (Locomotion)    [place] Level (Gait) moderate assist (50% patient effort);2 person assist;verbal cues;nonverbal cues (demo/gesture)  -PH     Assistive Device (Gait) walker, front-wheeled  -PH     Distance in Feet (Gait) 0.2  pt initiated 1 step to r w/ R foot clearance and L wt shift noted  -PH     [place] Level (Stairs) unable to assess  -PH     Comment, (Gait/Stairs) weakness, pain in L knee as well as activity intolerance limited w/ pt a transfer to w/c at BL  -PH               User Key  (r) = Recorded By, (t) = Taken By, (c) = Cosigned By      Initials Name Provider Type     Ivelisse Lance PTA Physical Therapist Assistant                   Obj/Interventions       Row Name 04/04/24 1312          Motor Skills    Therapeutic Exercise other (see comments)  BAP, LAQ; x 10 reps  -PH       Row Name 04/04/24 1312          Balance    Balance Assessment sitting static balance;standing static balance  -PH     Static Sitting Balance standby assist  -PH     Static Standing Balance moderate assist;2-person assist;verbal cues;non-verbal cues (demo/gesture)  -PH     Position/Device Used, Standing Balance walker, front-wheeled  -PH               User Key  (r) = Recorded By, (t) = Taken By, (c) = Cosigned By      Initials Name Provider Type    Ivelisse Jackson PTA Physical Therapist Assistant                   Goals/Plan    No documentation.                  Clinical Impression       Row Name 04/04/24 1313 04/04/24 1312        Pain    Pre/Posttreatment Pain Comment R knee pain w/ pt mostly non verbal and indicated pain by gesturing and goans  -PH --    Pain Intervention(s) Ambulation/increased activity;Repositioned  -PH --    Additional Documentation -- Pain Scale: Numbers Pre/Post-Treatment (Group)  -PH      Row Name 04/04/24 1313          Plan of Care Review    Plan of Care Reviewed With patient  -PH     Progress improving  -PH     Outcome Evaluation Pt was seen by PT/OT this AM 2/2 weakness and high level of activity intolerance. Pt req max A x 2 for supine to sit. Pt showed incr activity tolerance w/ pt sitting at EOB w/ SBA. Pt stood 2x from EOB req mod A x 2 and use of fww. When asked, pt gestured to R knee after groaning from pain when moved. Pt was able to mostly complete to upright w/ B knees and hips in flexion. Pt was able to take 1 step to R w/ R foot clearing floor and L wt shift req mod A x 2 w/ use of fww. Pt returned to bed w/ max/dep A x 2. PT will prog as pt alexei. Notified nsg that bed controls were not operational.  -PH       Row Name 04/04/24 1313          Vital Signs    O2 Delivery Pre Treatment room air  -PH     O2 Delivery Intra Treatment room air  -PH     O2 Delivery Post Treatment room air  -PH       Row Name 04/04/24 1313          Positioning and Restraints    Pre-Treatment Position in bed  -PH     Post Treatment Position bed  -PH     In Bed fowlers;call light within reach;encouraged to call for assist;exit alarm on;notified ns  -               User Key  (r) = Recorded By, (t) = Taken By, (c) = Cosigned By      Initials Name Provider Type    PH Ivelisse Lance PTA Physical Therapist Assistant                   Outcome Measures       Row Name 04/04/24 1318 04/04/24 0920       How much help from another person do you currently need...    Turning from your back to your side while in flat bed without using bedrails? 2  -PH 2  -EB    Moving from lying on back to sitting on the side of a flat bed without  bedrails? 2  -PH 2  -EB    Moving to and from a bed to a chair (including a wheelchair)? 2  -PH 1  -EB    Standing up from a chair using your arms (e.g., wheelchair, bedside chair)? 2  -PH 1  -EB    Climbing 3-5 steps with a railing? 1  -PH 1  -EB    To walk in hospital room? 1  -PH 1  -EB    AM-PAC 6 Clicks Score (PT) 10  -PH 8  -EB    Highest Level of Mobility Goal 4 --> Transfer to chair/commode  -PH 3 --> Sit at edge of bed  -EB      Row Name 04/04/24 1318 04/04/24 1233       Functional Assessment    Outcome Measure Options AM-PAC 6 Clicks Basic Mobility (PT)  -PH AM-PAC 6 Clicks Daily Activity (OT)  -              User Key  (r) = Recorded By, (t) = Taken By, (c) = Cosigned By      Initials Name Provider Type     Ivelisse Lance PTA Physical Therapist Assistant    Zabrina Roca RN Registered Nurse    Mindy Miranda OT Occupational Therapist                                 Physical Therapy Education       Title: PT OT SLP Therapies (In Progress)       Topic: Physical Therapy (Done)       Point: Mobility training (Done)       Learning Progress Summary             Patient Acceptance, E,TB,D, NR,DU by  at 4/4/2024 1318    Acceptance, E,TB,D, VU,NR by  at 4/3/2024 1530    Acceptance, E,D, NR by  at 4/2/2024 1019    Acceptance, E, NR,VU by DB at 4/1/2024 1531    Acceptance, E, NR by [facility] at 3/29/2024 1129    Acceptance, E,D, NR by MS at 3/27/2024 1540                         Point: Home exercise program (Done)       Learning Progress Summary             Patient Acceptance, E,TB,D, NR,DU by  at 4/4/2024 1318    Acceptance, E,TB,D, VU,NR by  at 4/3/2024 1530    Acceptance, E,D, NR by  at 4/2/2024 1019    Acceptance, E, NR,VU by DB at 4/1/2024 1531    Acceptance, E, NR by [facility] at 3/29/2024 1129    Acceptance, E,D, NR by MS at 3/27/2024 1540                         Point: Body mechanics (Done)       Learning Progress Summary             Patient Acceptance, E,TB,NATA, NR,DU by  at 4/4/2024  1318    Acceptance, E,TB,D, VU,NR by SM at 4/3/2024 1530    Acceptance, E,D, NR by SM at 4/2/2024 1019    Acceptance, E, NR,VU by DB at 4/1/2024 1531    Acceptance, E, NR by SM1 at 3/29/2024 1129    Acceptance, E,D, NR by MS at 3/27/2024 1540                         Point: Precautions (Done)       Learning Progress Summary             Patient Acceptance, E,TB,D, NR,DU by PH at 4/4/2024 1318    Acceptance, E,TB,D, VU,NR by SM at 4/3/2024 1530    Acceptance, E,D, NR by SM at 4/2/2024 1019    Acceptance, E, NR,VU by DB at 4/1/2024 1531    Acceptance, E, NR by 1 at 3/29/2024 1129    Acceptance, E,D, NR by MS at 3/27/2024 1540                                         User Key       Initials Effective Dates Name Provider Type Discipline    MS 06/16/21 -  Landon Townsend, PT Physical Therapist PT     03/07/18 -  Mireya Portillo PTA Physical Therapist Assistant PT     06/16/21 -  Felipa Weathers, PT Physical Therapist PT     06/16/21 -  Ivelisse Lance PTA Physical Therapist Assistant PT    [facility] 05/02/22 -  Sussy Walker, PT Physical Therapist PT                  PT Recommendation and Plan     Plan of Care Reviewed With: patient  Progress: improving  Outcome Evaluation: Pt was seen by PT/OT this AM 2/2 weakness and high level of activity intolerance. Pt req max A x 2 for supine to sit. Pt showed incr activity tolerance w/ pt sitting at EOB w/ SBA. Pt stood 2x from EOB req mod A x 2 and use of fww. When asked, pt gestured to R knee after groaning from pain when moved. Pt was able to mostly complete to upright w/ B knees and hips in flexion. Pt was able to take 1 step to R w/ R foot clearing floor and L wt shift req mod A x 2 w/ use of fww. Pt returned to bed w/ max/dep A x 2. PT will prog as pt alexei. Notified nsg that bed controls were not operational.     Time Calculation:         PT Charges       Row Name 04/04/24 7978             Time Calculation    Start Time 1040  -PH      Stop Time 1103  -PH       Time Calculation (min) 23 min  -PH      PT Received On 04/04/24  -PH      PT - Next Appointment 04/05/24  -PH         Timed Charges    27921 - PT Therapeutic Activity Minutes 23  -PH         Total Minutes    Timed Charges Total Minutes 23  -PH       Total Minutes 23  -PH                User Key  (r) = Recorded By, (t) = Taken By, (c) = Cosigned By      Initials Name Provider Type    PH Ivelisse Lance PTA Physical Therapist Assistant                  Therapy Charges for Today       Code Description Service Date Service Provider Modifiers Qty    53585401954 HC PT THER PROC EA 15 MIN 4/4/2024 Ivelisse Lance PTA GP 1    76856702495 HC PT THERAPEUTIC ACT EA 15 MIN 4/4/2024 Ivelisse Lance PTA GP 2            PT G-Codes  Outcome Measure Options: AM-PAC 6 Clicks Basic Mobility (PT)  AM-PAC 6 Clicks Score (PT): 10  AM-PAC 6 Clicks Score (OT): 9  PT Discharge Summary  Anticipated Discharge Disposition (PT): skilled nursing facility    Ivelisse Lance PTA  4/4/2024

## 2024-04-04 NOTE — PLAN OF CARE
Goal Outcome Evaluation:  Plan of Care Reviewed With: patient        Progress: improving  Outcome Evaluation: Pt was seen by PT/OT this AM 2/2 weakness and high level of activity intolerance. Pt req max A x 2 for supine to sit. Pt showed incr activity tolerance w/ pt sitting at EOB w/ SBA. Pt stood 2x from EOB req mod A x 2 and use of fww. When asked, pt gestured to R knee after groaning from pain when moved. Pt was able to mostly complete to upright w/ B knees and hips in flexion. Pt was able to take 1 step to R w/ R foot clearing floor and L wt shift req mod A x 2 w/ use of fww. Pt returned to bed w/ max/dep A x 2. PT will prog as pt alexei. Notified nsg that bed controls were not operational.      Anticipated Discharge Disposition (PT): skilled nursing facility

## 2024-04-05 LAB
ALBUMIN SERPL-MCNC: 2.4 G/DL (ref 3.5–5.2)
ANION GAP SERPL CALCULATED.3IONS-SCNC: 8.8 MMOL/L (ref 5–15)
BUN SERPL-MCNC: 33 MG/DL (ref 8–23)
BUN/CREAT SERPL: 39.8 (ref 7–25)
CALCIUM SPEC-SCNC: 8.6 MG/DL (ref 8.6–10.5)
CHLORIDE SERPL-SCNC: 108 MMOL/L (ref 98–107)
CO2 SERPL-SCNC: 24.2 MMOL/L (ref 22–29)
CREAT SERPL-MCNC: 0.83 MG/DL (ref 0.76–1.27)
EGFRCR SERPLBLD CKD-EPI 2021: 88.5 ML/MIN/1.73
GLUCOSE BLDC GLUCOMTR-MCNC: 105 MG/DL (ref 70–130)
GLUCOSE BLDC GLUCOMTR-MCNC: 105 MG/DL (ref 70–130)
GLUCOSE BLDC GLUCOMTR-MCNC: 83 MG/DL (ref 70–130)
GLUCOSE BLDC GLUCOMTR-MCNC: 87 MG/DL (ref 70–130)
GLUCOSE SERPL-MCNC: 125 MG/DL (ref 65–99)
MAGNESIUM SERPL-MCNC: 1.9 MG/DL (ref 1.6–2.4)
PHOSPHATE SERPL-MCNC: 3 MG/DL (ref 2.5–4.5)
POTASSIUM SERPL-SCNC: 4 MMOL/L (ref 3.5–5.2)
SODIUM SERPL-SCNC: 141 MMOL/L (ref 136–145)

## 2024-04-05 PROCEDURE — 82948 REAGENT STRIP/BLOOD GLUCOSE: CPT

## 2024-04-05 PROCEDURE — 97530 THERAPEUTIC ACTIVITIES: CPT

## 2024-04-05 PROCEDURE — 83735 ASSAY OF MAGNESIUM: CPT | Performed by: SURGERY

## 2024-04-05 PROCEDURE — 25010000002 ONDANSETRON PER 1 MG: Performed by: SURGERY

## 2024-04-05 PROCEDURE — 25010000002 ENOXAPARIN PER 10 MG: Performed by: STUDENT IN AN ORGANIZED HEALTH CARE EDUCATION/TRAINING PROGRAM

## 2024-04-05 PROCEDURE — 80069 RENAL FUNCTION PANEL: CPT | Performed by: INTERNAL MEDICINE

## 2024-04-05 RX ADMIN — ONDANSETRON 4 MG: 2 INJECTION INTRAMUSCULAR; INTRAVENOUS at 04:53

## 2024-04-05 RX ADMIN — POLYETHYLENE GLYCOL 3350 17 G: 17 POWDER, FOR SOLUTION ORAL at 08:14

## 2024-04-05 RX ADMIN — TERAZOSIN HYDROCHLORIDE 1 MG: 1 CAPSULE ORAL at 20:28

## 2024-04-05 RX ADMIN — ANORECTAL OINTMENT 1 APPLICATION: 15.7; .44; 24; 20.6 OINTMENT TOPICAL at 20:28

## 2024-04-05 RX ADMIN — ENOXAPARIN SODIUM 40 MG: 100 INJECTION SUBCUTANEOUS at 18:19

## 2024-04-05 RX ADMIN — LANSOPRAZOLE 30 MG: 15 TABLET, ORALLY DISINTEGRATING ORAL at 08:13

## 2024-04-05 RX ADMIN — DOCUSATE SODIUM 50MG AND SENNOSIDES 8.6MG 2 TABLET: 8.6; 5 TABLET, FILM COATED ORAL at 08:14

## 2024-04-05 RX ADMIN — ANORECTAL OINTMENT 1 APPLICATION: 15.7; .44; 24; 20.6 OINTMENT TOPICAL at 08:14

## 2024-04-05 NOTE — PROGRESS NOTES
"Nutrition Services    Patient Name:  Anthony Gallegos  YOB: 1945  MRN: [id number]  Admit Date:  3/25/2024    Assessment Date:  04/05/24    Comments: TF follow up    Tolerating TF well. NS renal infusing at 50mL/hr goal rate. 300 mL q 4 water flushes. Pharyngeal secretions culture came back negative. Pt is noted as being quick to fatigue. Speech becoming clearer. Wt stable. Will continue to monitor. Change FWF to 50 mL 4 based on Na.    Labs: Na 141, Glu 105/105/125, BUN 33  Meds: Miralax, pericolace  Last BM: 4/4    Recommendations:  Continue NS renal at 50mL/hr goal rate.   Change FWF to 50 mL q 4 water flushes   Monitor Na+ lab.  Monitor swallow function    RD to follow tolerance, labs and clinical course.     CLINICAL NUTRITION ASSESSMENT      Reason for Assessment Follow-up Protocol     Diagnosis/Problem   UTI   Medical/Surgical History Past Medical History:   Diagnosis Date    Abscess of scrotum     MARTITA (acute kidney injury)     Altered mental state     Arthritis     AS (ankylosing spondylitis)     History of MRSA infection     Hypertension     Leukocytosis     Tetrahydrocannabinol (THC) use disorder, mild, abuse 12/20/2023    Uveitis        Past Surgical History:   Procedure Laterality Date    COLONOSCOPY      ENDOSCOPY W/ PEG TUBE PLACEMENT N/A 3/29/2024    Procedure: ESOPHAGOGASTRODUODENOSCOPY WITH PERCUTANEOUS ENDOSCOPIC GASTROSTOMY TUBE INSERTION;  Surgeon: Khadar Broderick MD;  Location: [facility] ENDOSCOPY;  Service: General;  Laterality: N/A;  PRE/POST - DYSPHAGIA    ROTATOR CUFF REPAIR Right     TOTAL HIP ARTHROPLASTY Left 2014        Anthropometrics        Current Height  Current Weight  BMI kg/m2 Height: 190.5 cm (75\")  Weight: 81.4 kg (179 lb 7.3 oz) (04/03/24 0521)  Body mass index is 22.43 kg/m².   Adjusted BMI (if applicable)    BMI Category Normal/Healthy (18.4 - 24.9)   Ideal Body Weight (IBW) 196 lb (89.1 kg)   Usual Body Weight (UBW) 152-188 lb   Weight Trend Loss, " Amount/Timeframe: 36 lb (19.1%) x 2 months   Weight History Wt Readings from Last 30 Encounters:   04/03/24 0521 81.4 kg (179 lb 7.3 oz)   04/02/24 0435 80.4 kg (177 lb 4 oz)   04/01/24 0420 80.9 kg (178 lb 5.6 oz)   03/31/24 0530 82.7 kg (182 lb 6.4 oz)   03/30/24 0557 82.2 kg (181 lb 3.5 oz)   03/29/24 0548 82.6 kg (182 lb 1.6 oz)   03/28/24 0500 76.9 kg (169 lb 8 oz)   03/27/24 0617 76.4 kg (168 lb 6.4 oz)   03/25/24 2123 69.1 kg (152 lb 6.4 oz)   03/25/24 1516 79 kg (174 lb 2.6 oz)   01/31/24 0639 79 kg (174 lb 3.2 oz)   01/29/24 0509 78.8 kg (173 lb 11.6 oz)   01/28/24 0530 77.7 kg (171 lb 4.8 oz)   01/25/24 0537 76.4 kg (168 lb 6.9 oz)   01/23/24 0542 73.2 kg (161 lb 6 oz)   01/22/24 0502 75.1 kg (165 lb 9.1 oz)   01/21/24 2304 75.5 kg (166 lb 7.2 oz)   01/21/24 1744 81.6 kg (180 lb)   01/08/24 0253 81.6 kg (179 lb 14.3 oz)   01/05/24 0440 87.9 kg (193 lb 12.6 oz)   01/04/24 0439 85.6 kg (188 lb 11.4 oz)   01/03/24 0513 82.5 kg (181 lb 14.1 oz)   12/31/23 1300 80.5 kg (177 lb 7.5 oz)   12/31/23 0350 83.8 kg (184 lb 11.9 oz)   12/30/23 0511 85.3 kg (188 lb 0.8 oz)   12/29/23 0341 85.7 kg (188 lb 15 oz)   12/28/23 0435 85.4 kg (188 lb 4.4 oz)   12/27/23 0755 81 kg (178 lb 9.2 oz)   12/27/23 0423 85.4 kg (188 lb 4.4 oz)   12/25/23 0707 85.2 kg (187 lb 13.3 oz)   12/23/23 0608 81.3 kg (179 lb 3.7 oz)   12/22/23 0430 81.8 kg (180 lb 5.4 oz)   12/20/23 1005 81.9 kg (180 lb 8 oz)   03/27/18 1439 100 kg (221 lb)   03/22/18 1613 101 kg (222 lb 12.8 oz)   01/23/18 1414 101 kg (223 lb)   12/26/17 1111 99.8 kg (220 lb)   10/19/17 1239 95.3 kg (210 lb)   08/30/17 1459 91.3 kg (201 lb 3.2 oz)   08/29/17 1221 93 kg (205 lb)   08/10/17 1517 88 kg (194 lb)   07/14/17 1041 85.9 kg (189 lb 6.4 oz)   06/29/17 1318 84.5 kg (186 lb 3.2 oz)   06/21/17 1922 90.7 kg (200 lb)      --  Estimated/Assessed Needs        Current Weight  Weight: 81.4 kg (179 lb 7.3 oz) (04/03/24 0521)       Energy Requirements    Weight for Calculation 152  lb (69.1 kg)   Method for Estimation  30-35 kcal/kg   EST Needs (kcal/day) 8142-1742       Protein Requirements    Weight for Calculation 152 lb (69.1 kg)   EST Protein Needs (g/kg) 1.2 - 1.5 gm/kg   EST Daily Needs (g/day)        Fluid Requirements     Method for Estimation 1 mL/kcal    EST Needs (mL/day)      Labs       Pertinent Labs    Results from last 7 days   Lab Units 04/05/24  0652 04/04/24  1608 04/04/24  0529 04/03/24  1623 04/03/24  0406   SODIUM mmol/L 141  --  141  --  144   POTASSIUM mmol/L 4.0 4.3 3.6   < > 3.6   CHLORIDE mmol/L 108*  --  110*  --  112*   CO2 mmol/L 24.2  --  26.2  --  26.0   BUN mg/dL 33*  --  28*  --  34*   CREATININE mg/dL 0.83  --  0.69*  --  0.97   CALCIUM mg/dL 8.6  --  8.6  --  8.2*   GLUCOSE mg/dL 125*  --  116*  --  111*    < > = values in this interval not displayed.     Results from last 7 days   Lab Units 04/05/24  0652 04/04/24  0529 04/03/24  0406 04/02/24  0320   MAGNESIUM mg/dL 1.9 1.7 1.7 1.9   PHOSPHORUS mg/dL 3.0 2.5 3.0 2.3*   HEMOGLOBIN g/dL  --   --   --  10.1*   HEMATOCRIT %  --   --   --  32.0*   WBC 10*3/mm3  --   --   --  7.05   ALBUMIN g/dL 2.4* 2.3* 2.3* 2.3*     Results from last 7 days   Lab Units 04/02/24  0320 04/01/24  0355 03/31/24  0349 03/30/24  0305   PLATELETS 10*3/mm3 327 383 422 467*     COVID19   Date Value Ref Range Status   03/25/2024 Not Detected Not Detected - Ref. Range Final     Lab Results   Component Value Date    HGBA1C 5.80 (H) 01/23/2024          Medications           Scheduled Medications bisacodyl, 10 mg, Rectal, Once  enoxaparin, 40 mg, Subcutaneous, Q24H  lansoprazole, 30 mg, Per G Tube, Q AM  Menthol-Zinc Oxide, 1 Application, Topical, Q12H  polyethylene glycol, 17 g, Per G Tube, Daily  senna-docusate sodium, 2 tablet, Per G Tube, Daily  terazosin, 1 mg, Per G Tube, Nightly       Infusions       PRN Medications   acetaminophen    senna-docusate sodium **AND** [DISCONTINUED] polyethylene glycol **AND** bisacodyl **AND**  bisacodyl    dextrose    dextrose    glucagon (human recombinant)    melatonin    ondansetron ODT **OR** ondansetron    phenol    Potassium Replacement - Follow Nurse / BPA Driven Protocol    [COMPLETED] Insert Peripheral IV **AND** sodium chloride     Physical Findings          General Findings confused, disoriented, loss of muscle mass, loss of subcutaneous fat, room air   Oral/Mouth Cavity WDL   Edema  no edema   Gastrointestinal hypoactive bowel sounds, non-distended    Skin  bruising, pressure injury: st II coccyx, other: abrasion   Tubes/Drains/Lines none   NFPE See Malnutrition Severity Assessment, Date Completed: 3/26   --  Malnutrition Severity Assessment      Patient meets criteria for : Severe Malnutrition         Current Nutrition Orders & Evaluation of Intake       Oral Nutrition     Food Allergies NKFA   Current PO Diet NPO Diet NPO Type: Strict NPO   Supplement n/a   PO Evaluation     % PO Intake N/a    Factors Affecting Intake: swallow impairment      Enteral Nutrition     Enteral Route PEG    TF Delivery Method Continuous    Propofol Rate/Kcal     Current TF Order/Rate  Novasource Renal @ 50 mL/hr    TF Goal Rate 50 mL/hr    Current Water Flush 300 mL/hr q 4    Modular None    TF Residual  no or minimal residual    TF Tolerance tolerating    TF Observation Verified correct TF and water flush infusing per orders     PES STATEMENT / NUTRITION DIAGNOSIS      Nutrition Dx Problem  Problem: Malnutrition (severe)  Etiology: Factors Affecting Nutrition - poor PO, weakness, decreased mobility    Signs/Symptoms: NPO, Report of Minimal PO Intake, NFPE Results, Unintended Weight Change, and Report/Observation     NUTRITION INTERVENTION / PLAN OF CARE      Intervention Goal(s) Maintain nutrition status, Reduce/improve symptoms, Meet estimated needs, Disease management/therapy, Initiate feeding/diet, and Appropriate weight gain         RD Intervention/Action Await initiation/advancement of PO diet, Await  initiation of EN/PN, Continue to monitor, and Care plan reviewed     Nutrition Prescription        Enteral Prescription:     Enteral Route PEG    TF Delivery Method Continuous    Enteral Product Novasource Renal    Modular None    Propofol Rate/Kcal     TF Start Rate/Volume  20 mL    TF Goal Rate/Volume 50 mL     Free Water Flush Per Nephrology    TF Provision at Goal:          Calories 2200 kcal, meets 100 % needs         Protein  100 gm protein, meets 100 % needs         Fluid (mL) 50 mL/hr    Prescription Ordered No, recommended     --      Monitor/Evaluation Per protocol   Discharge Plan/Needs Pending clinical course   --    RD to follow per protocol.      Electronically signed by:  Mindy Rowe  04/05/24 09:41 EDT

## 2024-04-05 NOTE — NURSING NOTE
Received new order from Dr. Townsend to scan bladder, if scan is greater than 400, anchor Mariscal catheter.

## 2024-04-05 NOTE — THERAPY TREATMENT NOTE
Patient Name: Anthony Gallegos  : 1945    MRN: [id number]                              Today's Date: 2024       Admit Date: 3/25/2024    Visit Dx:     ICD-10-CM ICD-9-CM   1. Decreased oral intake  R63.8 783.9   2. MARTITA (acute kidney injury)  N17.9 584.9   3. Whole body pain  R52 780.96   4. Dysphagia, unspecified type  R13.10 787.20   5. Dehydration  E86.0 276.51   6. Elevated troponin  R79.89 790.6   7. Failure to thrive in adult  R62.7 783.7   8. Generalized weakness  R53.1 780.79   9. Weight loss  R63.4 783.21   10. Urinary tract infection in male  N39.0 599.0     Patient Active Problem List   Diagnosis    Ankylosing spondylitis of cervical region    Recent unexplained weight loss    Abnormal serum lipase level    Abnormal serum level of amylase    Hypertension    Boil    Immobility    Fall from bed    Tetrahydrocannabinol (THC) use disorder, mild, abuse    Generalized pain        Grief    DISH (disseminated idiopathic skeletal hyperostosis)    Generalized weakness    Severe malnutrition    Altered mental status    Transient alteration of awareness    GERD without esophagitis    BPH (benign prostatic hyperplasia)    UTI (urinary tract infection)    Dehydration    MARTITA (acute kidney injury)    Hyperglycemia    Encephalopathy    Decreased oral intake    Dysphagia     Past Medical History:   Diagnosis Date    Abscess of scrotum     MARTITA (acute kidney injury)     Altered mental state     Arthritis     AS (ankylosing spondylitis)     History of MRSA infection     Hypertension     Leukocytosis     Tetrahydrocannabinol (THC) use disorder, mild, abuse 2023    Uveitis      Past Surgical History:   Procedure Laterality Date    COLONOSCOPY      ENDOSCOPY W/ PEG TUBE PLACEMENT N/A 3/29/2024    Procedure: ESOPHAGOGASTRODUODENOSCOPY WITH PERCUTANEOUS ENDOSCOPIC GASTROSTOMY TUBE INSERTION;  Surgeon: Khadar Broderick MD;  Location: [facility] ENDOSCOPY;  Service: General;  Laterality: N/A;  PRE/POST -  DYSPHAGIA    ROTATOR CUFF REPAIR Right     TOTAL HIP ARTHROPLASTY Left 2014      General Information       Row Name 04/05/24 1617          OT Time and Intention    Document Type therapy note (daily note)  -GEOFFREY     Mode of Treatment co-treatment;physical therapy;occupational therapy  -GEOFFREY       Row Name 04/05/24 1617          General Information    Patient Profile Reviewed yes  -GEOFFREY     Existing Precautions/Restrictions fall;NPO  PEG tube; abdominal binder; NPO  -GEOFFREY       Row Name 04/05/24 1617          Cognition    Orientation Status (Cognition) oriented to;person  -GEOFFREY       Row Name 04/05/24 1617          Safety Issues, Functional Mobility    Impairments Affecting Function (Mobility) balance;endurance/activity tolerance;strength;range of motion (ROM);postural/trunk control;pain  -GEOFFREY     Comment, Safety Issues/Impairments (Mobility) Co-treatment medically necessary and appropriate d/t pt's acuity level, decreased endurance and activity tolerance, and safety of patient and staff. Treatment focused on progression of care and goals established in POC.  -GEOFFREY               User Key  (r) = Recorded By, (t) = Taken By, (c) = Cosigned By      Initials Name Provider Type    GEOFFREYLeena Moreland OT Occupational Therapist                     Mobility/ADL's       Row Name 04/05/24 1618          Bed Mobility    Bed Mobility supine-sit;sit-supine  -GEOFFREY     Supine-Sit [place] (Bed Mobility) maximum assist (25% patient effort);verbal cues;nonverbal cues (demo/gesture);1 person assist  -GOEFFREY     Sit-Supine [place] (Bed Mobility) maximum assist (25% patient effort);2 person assist;verbal cues;nonverbal cues (demo/gesture)  -GEOFFREY     Assistive Device (Bed Mobility) bed rails;head of bed elevated;draw sheet  -GEOFFREY     Comment, (Bed Mobility) Cues for seq and initiation with posterior lean sitting EOB despite freq cues  -GEOFFREY       Row Name 04/05/24 1618          Transfers    Comment, (Transfers) Pt unable to alexei sitting EOB this PM and  unsafe for txfers  -GEOFFREY       Row Name 04/05/24 1618          Sit-Stand Transfer    Sit-Stand [place] (Transfers) not tested  -GEOFFREY       [facility] Name 04/05/24 1618          Functional Mobility    Functional Mobility- Ind. Level not tested  -GEOFFREY       [facility] Name 04/05/24 1618          Activities of Daily Living    BADL Assessment/Intervention grooming;lower body dressing  -GEOFFREY       [facility] Name 04/05/24 1618          Grooming Assessment/Training    [place] Level (Grooming) grooming skills;set up;wash face, hands;minimum assist (75% patient effort);moderate assist (50% patient effort);verbal cues;nonverbal cues (demo/gesture)  -GEOFFREY     Position (Grooming) sitting up in bed  -GEOFFREY     Comment, (Grooming) cues for initiation  -GEOFFREY       [facility] Name 04/05/24 1618          Self-Feeding Assessment/Training    [place] Level (Feeding) dependent (less than 25% patient effort);feeding skills  -GEOFFREY     Assistive Devices (Feeding) other (see comments)  -GEOFFREY     Comment, (Feeding) PEG tube present  -GEOFFREY       [facility] Name 04/05/24 1618          Lower Body Dressing Assessment/Training    [place] Level (Lower Body Dressing) lower body dressing skills;dependent (less than 25% patient effort);socks;verbal cues;nonverbal cues (demo/gesture)  -GEOFFREY     Position (Lower Body Dressing) edge of bed sitting  -GEOFFREY     Comment, (Lower Body Dressing) OT donned non skid socks this PM and noted bandage hanging from L heel and OT notified nsg  -GEOFFREY               User Key  (r) = Recorded By, (t) = Taken By, (c) = Cosigned By      Initials Name Provider Type    GEOFFREYLeena Moreland OT Occupational Therapist                   Obj/Interventions       Row Name 04/05/24 1621          Motor Skills    Motor Skills functional endurance  -GEOFFREY     Functional Endurance Poor; fatigues quickly sitting EOB and req to go back to bed this PM.  -GEOFFREY       [facility] Name 04/05/24 1621          Balance    Balance Assessment sitting static balance;sitting dynamic balance  -GEOFFREY     Static  Sitting Balance contact guard;maximum assist;1-person assist  MAX A with intermittent periods of CGA sitting EOB  -GEOFFREY     Dynamic Sitting Balance contact guard;maximum assist;1-person assist  MAX A with intermittent periods of CGA sitting EOB  -GEOFFREY     Balance Interventions sitting;supported;static;dynamic;occupation based/functional task  -GEOFFREY     Comment, Balance posterior lean in sitting this PM and dec act alexei  -GEOFFREY               User Key  (r) = Recorded By, (t) = Taken By, (c) = Cosigned By      Initials Name Provider Type    Leena Peng, STUART Occupational Therapist                   Goals/Plan    No documentation.                  Clinical Impression       Row Name 04/05/24 1623          Pain Assessment    Pre/Posttreatment Pain Comment pt did not rate pain on numeric scale but facial grimace and gasping noted with bed mobility  -GEOFFREY     Pain Intervention(s) Repositioned;Rest  -GEOFFREY       Row Name 04/05/24 1623          Plan of Care Review    Plan of Care Reviewed With patient  -GEOFFREY     Outcome Evaluation Pt supine in bed on OT arrival this PM and agreeable to tx. Pt MAX A to sit EOB with MAX A sitting balance at EOB while OT donned B socks DEP. Pt tolerated for ~5-6 mins and then return to bed with MAX A x2. OT will continue to follow and progress as alexei.  -GEOFFREY       Row Name 04/05/24 1623          Therapy Assessment/Plan (OT)    Criteria for Skilled Therapeutic Interventions Met (OT) yes;skilled treatment is necessary  -GEOFFREY     Therapy Frequency (OT) 3 times/wk  -GEOFFREY       Row Name 04/05/24 1623          Therapy Plan Review/Discharge Plan (OT)    Anticipated Discharge Disposition (OT) skilled nursing facility  -GEOFFREY       Row Name 04/05/24 1623          Positioning and Restraints    Pre-Treatment Position in bed  -GEOFFREY     Post Treatment Position bed  -GEOFFREY     In Bed notified nsg;fowlers;call light within reach;encouraged to call for assist;exit alarm on  -GEOFFREY               User Key  (r) = Recorded By, (t) = Taken By,  (c) = Cosigned By      Initials Name Provider Type    Leena Peng, STUART Occupational Therapist                   Outcome Measures       Row Name 04/05/24 1626          How much help from another is currently needed...    Putting on and taking off regular lower body clothing? 1  -GEOFFREY     Bathing (including washing, rinsing, and drying) 1  -GEOFFREY     Toileting (which includes using toilet bed pan or urinal) 1  -GEOFFREY     Putting on and taking off regular upper body clothing 2  -GEOFFREY     Taking care of personal grooming (such as brushing teeth) 3  -GEOFFREY     Eating meals 1  PEG tube  -GEOFFREY     AM-PAC 6 Clicks Score (OT) 9  -GEOFFREY       Row Name 04/05/24 1513          How much help from another person do you currently need...    Turning from your back to your side while in flat bed without using bedrails? 2  -PH     Moving from lying on back to sitting on the side of a flat bed without bedrails? 2  -PH     Moving to and from a bed to a chair (including a wheelchair)? 1  -PH     Standing up from a chair using your arms (e.g., wheelchair, bedside chair)? 2  -PH     Climbing 3-5 steps with a railing? 1  -PH     To walk in hospital room? 1  -PH     AM-PAC 6 Clicks Score (PT) 9  -PH     Highest Level of Mobility Goal 3 --> Sit at edge of bed  -PH       Row Name 04/05/24 1626 04/05/24 1513       Functional Assessment    Outcome Measure Options AM-PAC 6 Clicks Daily Activity (OT)  -GEOFFREY AM-PAC 6 Clicks Basic Mobility (PT)  -PH              User Key  (r) = Recorded By, (t) = Taken By, (c) = Cosigned By      Initials Name Provider Type    PH Ivelisse Lance PTA Physical Therapist Assistant    Leena Peng, STUART Occupational Therapist                    Occupational Therapy Education       Title: PT OT SLP Therapies (In Progress)       Topic: Occupational Therapy (In Progress)       Point: ADL training (Done)       Description:   Instruct learner(s) on proper safety adaptation and remediation techniques during self care or transfers.    Instruct in proper use of assistive devices.                  Learning Progress Summary             Patient Acceptance, E, VU,NR by PP at 3/31/2024 1008    Comment: Pt Ed on OT role, d/c planning and call light function. Difficult to assess understanding.                         Point: Home exercise program (Not Started)       Description:   Instruct learner(s) on appropriate technique for monitoring, assisting and/or progressing therapeutic exercises/activities.                  Learner Progress:  Not documented in this visit.              Point: Precautions (Not Started)       Description:   Instruct learner(s) on prescribed precautions during self-care and functional transfers.                  Learner Progress:  Not documented in this visit.              Point: Body mechanics (Not Started)       Description:   Instruct learner(s) on proper positioning and spine alignment during self-care, functional mobility activities and/or exercises.                  Learner Progress:  Not documented in this visit.                              User Key       Initials Effective Dates Name Provider Type Discipline     06/09/23 -  Tracy Vega OT Occupational Therapist OT                  OT Recommendation and Plan  Therapy Frequency (OT): 3 times/wk  Plan of Care Review  Plan of Care Reviewed With: patient  Outcome Evaluation: Pt supine in bed on OT arrival this PM and agreeable to tx. Pt MAX A to sit EOB with MAX A sitting balance at EOB while OT donned B socks DEP. Pt tolerated for ~5-6 mins and then return to bed with MAX A x2. OT will continue to follow and progress as alexei.     Time Calculation:         Time Calculation- OT       Row Name 04/05/24 1626             Time Calculation- OT    OT Start Time 1432  -GEOFFREY      OT Stop Time 1451  -GEOFFREY      OT Time Calculation (min) 19 min  -GEOFFREY      Total Timed Code Minutes- OT 19 minute(s)  -GEOFFREY      OT Received On 04/05/24  -GEOFFREY      OT - Next Appointment 04/08/24  -GEOFFREY          Timed Charges    92346 - OT Therapeutic Activity Minutes 19  -GEOFFREY         Total Minutes    Timed Charges Total Minutes 19  -GEOFFREY       Total Minutes 19  -GEOFFREY                User Key  (r) = Recorded By, (t) = Taken By, (c) = Cosigned By      Initials Name Provider Type    Leena Peng OT Occupational Therapist                  Therapy Charges for Today       Code Description Service Date Service Provider Modifiers Qty    [id number]  OT THERAPEUTIC ACT EA 15 MIN 4/5/2024 Leena Trinidad OT GO 1                 Leena Trinidad OT  4/5/2024

## 2024-04-05 NOTE — THERAPY TREATMENT NOTE
Patient Name: Anthony Gallegos  : 1945    MRN: [id number]                              Today's Date: 2024       Admit Date: 3/25/2024    Visit Dx:     ICD-10-CM ICD-9-CM   1. Decreased oral intake  R63.8 783.9   2. MARTITA (acute kidney injury)  N17.9 584.9   3. Whole body pain  R52 780.96   4. Dysphagia, unspecified type  R13.10 787.20   5. Dehydration  E86.0 276.51   6. Elevated troponin  R79.89 790.6   7. Failure to thrive in adult  R62.7 783.7   8. Generalized weakness  R53.1 780.79   9. Weight loss  R63.4 783.21   10. Urinary tract infection in male  N39.0 599.0     Patient Active Problem List   Diagnosis    Ankylosing spondylitis of cervical region    Recent unexplained weight loss    Abnormal serum lipase level    Abnormal serum level of amylase    Hypertension    Boil    Immobility    Fall from bed    Tetrahydrocannabinol (THC) use disorder, mild, abuse    Generalized pain        Grief    DISH (disseminated idiopathic skeletal hyperostosis)    Generalized weakness    Severe malnutrition    Altered mental status    Transient alteration of awareness    GERD without esophagitis    BPH (benign prostatic hyperplasia)    UTI (urinary tract infection)    Dehydration    MARTITA (acute kidney injury)    Hyperglycemia    Encephalopathy    Decreased oral intake    Dysphagia     Past Medical History:   Diagnosis Date    Abscess of scrotum     MARTITA (acute kidney injury)     Altered mental state     Arthritis     AS (ankylosing spondylitis)     History of MRSA infection     Hypertension     Leukocytosis     Tetrahydrocannabinol (THC) use disorder, mild, abuse 2023    Uveitis      Past Surgical History:   Procedure Laterality Date    COLONOSCOPY      ENDOSCOPY W/ PEG TUBE PLACEMENT N/A 3/29/2024    Procedure: ESOPHAGOGASTRODUODENOSCOPY WITH PERCUTANEOUS ENDOSCOPIC GASTROSTOMY TUBE INSERTION;  Surgeon: Khadar Broderick MD;  Location: [facility] ENDOSCOPY;  Service: General;  Laterality: N/A;  PRE/POST -  "DYSPHAGIA    ROTATOR CUFF REPAIR Right     TOTAL HIP ARTHROPLASTY Left 2014      General Information       Row Name 04/05/24 1503          Physical Therapy Time and Intention    Document Type therapy note (daily note)  -PH     Mode of Treatment co-treatment;occupational therapy;physical therapy  -[facility] Name 04/05/24 1503          General Information    Existing Precautions/Restrictions fall  -PH       [facility] Name 04/05/24 1503          Cognition    Orientation Status (Cognition) oriented to;person  -[facility] Name 04/05/24 1503          Safety Issues, Functional Mobility    Impairments Affecting Function (Mobility) balance;endurance/activity tolerance;strength;range of motion (ROM);postural/trunk control;pain  -PH     Comment, Safety Issues/Impairments (Mobility) Co treatment medically appropriate and necessary due to patient acuity level, activity tolerance and safety of patient and staff. Treatment is focusing on progression of care and goals established in the POC.  -PH               User Key  (r) = Recorded By, (t) = Taken By, (c) = Cosigned By      Initials Name Provider Type     Ivelisse Lance PTA Physical Therapist Assistant                   Mobility       Row Name 04/05/24 1504          Bed Mobility    Bed Mobility supine-sit;sit-supine  -PH     Supine-Sit Turner (Bed Mobility) maximum assist (25% patient effort);verbal cues;nonverbal cues (demo/gesture);1 person assist  -     Sit-Supine Turner (Bed Mobility) maximum assist (25% patient effort);2 person assist;verbal cues;nonverbal cues (demo/gesture)  -     Comment, (Bed Mobility) cues for initaion and sequencing; pt moved B LE toward EOB when head off mattress; bed was still not operational w/ mattress having to be deflated; staff is aware and awaiting replacement bed  -[facility] Name 04/05/24 1504          Transfers    Comment, (Transfers) very poor sit balance today w/ pt stating he was \"tired\" and wanted to return " to bed.  -PH       Row Name 04/05/24 1504          Bed-Chair Transfer    Bed-Chair [place] (Transfers) unable to assess  -PH       Row Name 04/05/24 1504          Sit-Stand Transfer    Sit-Stand [place] (Transfers) unable to assess  -PH       Row Name 04/05/24 1504          Gait/Stairs (Locomotion)    W[place]orf Level (Gait) unable to assess  -PH     Waldorf Level (Stairs) unable to assess  -PH               User Key  (r) = Recorded By, (t) = Taken By, (c) = Cosigned By      Initials Name Provider Type     Ivelisse Lance PTA Physical Therapist Assistant                   Obj/Interventions       Row Name 04/05/24 1506          Balance    Balance Assessment sitting static balance;standing static balance  -PH     Static Sitting Balance contact guard;maximum assist  -PH     Static Standing Balance unable to assess  -PH     Comment, Balance poor sit bal today w/ pt leaning posteriorly for majority of approx 6 min of sitting at EOB  -PH               User Key  (r) = Recorded By, (t) = Taken By, (c) = Cosigned By      Initials Name Provider Type     Ivelisse Lance PTA Physical Therapist Assistant                   Goals/Plan    No documentation.                  Clinical Impression       Row Name 04/05/24 1507          Pain    Pre/Posttreatment Pain Comment difficult to understand although grimacing noted when attempting to hold R L E off bed  -PH     Pain Intervention(s) Repositioned;Rest  -PH       Row Name 04/05/24 1507          Plan of Care Review    Plan of Care Reviewed With patient  -PH     Progress declining  -PH     Outcome Evaluation Pt was seen by PT / OT this PM for co-tx. Pt's activity tolerance is still low warranting co-tx. Pt req max A x 1 to sit up to EOB. Pt was able to move B LE toward EOB when held off mattress. Noted grimacing from pain in R knee again today when touched. Pt sat at EOB for over 5 min  w/ posterior lean requiring mostly max A to correct. Pt  continually stated he was tired and wanted to return to bed. Pt returned to bed req max A x 2. PT prog as pt alexei.  -PH       Row Name 04/05/24 1507          Vital Signs    O2 Delivery Pre Treatment room air  -PH     O2 Delivery Intra Treatment room air  -PH     O2 Delivery Post Treatment room air  -PH       Row Name 04/05/24 1507          Positioning and Restraints    In Bed fowlers;call light within reach;encouraged to call for assist;exit alarm on;notified nsg  -PH               User Key  (r) = Recorded By, (t) = Taken By, (c) = Cosigned By      Initials Name Provider Type     Ivelisse Lance PTA Physical Therapist Assistant                   Outcome Measures       Row Name 04/05/24 1513          How much help from another person do you currently need...    Turning from your back to your side while in flat bed without using bedrails? 2  -PH     Moving from lying on back to sitting on the side of a flat bed without bedrails? 2  -PH     Moving to and from a bed to a chair (including a wheelchair)? 1  -PH     Standing up from a chair using your arms (e.g., wheelchair, bedside chair)? 2  -PH     Climbing 3-5 steps with a railing? 1  -PH     To walk in hospital room? 1  -PH     AM-PAC 6 Clicks Score (PT) 9  -PH     Highest Level of Mobility Goal 3 --> Sit at edge of bed  -PH       Row Name 04/05/24 1513          Functional Assessment    Outcome Measure Options AM-PAC 6 Clicks Basic Mobility (PT)  -PH               User Key  (r) = Recorded By, (t) = Taken By, (c) = Cosigned By      Initials Name Provider Type     Ivelisse Lance PTA Physical Therapist Assistant                                 Physical Therapy Education       Title: PT OT SLP Therapies (In Progress)       Topic: Physical Therapy (In Progress)       Point: Mobility training (In Progress)       Learning Progress Summary             Patient Acceptance, E,TB, NR by  at 4/5/2024 1514    Acceptance, E,TB,D, NR,DU by  at 4/4/2024 1318     Acceptance, E,TB,D, VU,NR by SM at 4/3/2024 1530    Acceptance, E,D, NR by SM at 4/2/2024 1019    Acceptance, E, NR,VU by DB at 4/1/2024 1531    Acceptance, E, NR by SM1 at 3/29/2024 1129    Acceptance, E,D, NR by MS at 3/27/2024 1540                         Point: Home exercise program (In Progress)       Learning Progress Summary             Patient Acceptance, E,TB, NR by PH at 4/5/2024 1514    Acceptance, E,TB,D, NR,DU by PH at 4/4/2024 1318    Acceptance, E,TB,D, VU,NR by SM at 4/3/2024 1530    Acceptance, E,D, NR by SM at 4/2/2024 1019    Acceptance, E, NR,VU by DB at 4/1/2024 1531    Acceptance, E, NR by SM1 at 3/29/2024 1129    Acceptance, E,D, NR by MS at 3/27/2024 1540                         Point: Body mechanics (In Progress)       Learning Progress Summary             Patient Acceptance, E,TB, NR by PH at 4/5/2024 1514    Acceptance, E,TB,D, NR,DU by PH at 4/4/2024 1318    Acceptance, E,TB,D, VU,NR by SM at 4/3/2024 1530    Acceptance, E,D, NR by SM at 4/2/2024 1019    Acceptance, E, NR,VU by DB at 4/1/2024 1531    Acceptance, E, NR by SM1 at 3/29/2024 1129    Acceptance, E,D, NR by MS at 3/27/2024 1540                         Point: Precautions (In Progress)       Learning Progress Summary             Patient Acceptance, E,TB, NR by PH at 4/5/2024 1514    Acceptance, E,TB,D, NR,DU by PH at 4/4/2024 1318    Acceptance, E,TB,D, VU,NR by SM at 4/3/2024 1530    Acceptance, E,D, NR by SM at 4/2/2024 1019    Acceptance, E, NR,VU by DB at 4/1/2024 1531    Acceptance, E, NR by SM1 at 3/29/2024 1129    Acceptance, E,D, NR by MS at 3/27/2024 1540                                         User Key       Initials Effective Dates Name Provider Type Discipline    MS 06/16/21 -  Landon Townsend, PT Physical Therapist PT    SM 03/07/18 -  Mireya Portillo, TULIO Physical Therapist Assistant PT    DB 06/16/21 -  Felipa Weathers, PT Physical Therapist PT    PH 06/16/21 -  Ivelisse Lance PTA Physical Therapist  Assistant PT    SM1 05/02/22 -  Sussy Walker PT Physical Therapist PT                  PT Recommendation and Plan     Plan of Care Reviewed With: patient  Progress: declining  Outcome Evaluation: Pt was seen by PT / OT this PM for co-tx. Pt's activity tolerance is still low warranting co-tx. Pt req max A x 1 to sit up to EOB. Pt was able to move B LE toward EOB when held off mattress. Noted grimacing from pain in R knee again today when touched. Pt sat at EOB for over 5 min  w/ posterior lean requiring mostly max A to correct. Pt continually stated he was tired and wanted to return to bed. Pt returned to bed req max A x 2. PT prog as pt alexei.     Time Calculation:         PT Charges       Row Name 04/05/24 1515             Time Calculation    Start Time 1423  -PH      Stop Time 1452  -PH      Time Calculation (min) 29 min  -PH      PT Received On 04/05/24  -PH      PT - Next Appointment 04/08/24  -PH         Timed Charges    09384 - PT Therapeutic Activity Minutes 29  -PH         Total Minutes    Timed Charges Total Minutes 29  -PH       Total Minutes 29  -PH                User Key  (r) = Recorded By, (t) = Taken By, (c) = Cosigned By      Initials Name Provider Type    PH Ivelisse Lance PTA Physical Therapist Assistant                  Therapy Charges for Today       Code Description Service Date Service Provider Modifiers Qty    80737627752 HC PT THER PROC EA 15 MIN 4/4/2024 Ivelisse Lance, PTA GP 1    55957917239 HC PT THERAPEUTIC ACT EA 15 MIN 4/4/2024 Ivelisse Lance PTA GP 2    03795692305 HC PT THERAPEUTIC ACT EA 15 MIN 4/5/2024 Ivelisse Lance, PTA GP 2            PT G-Codes  Outcome Measure Options: AM-PAC 6 Clicks Basic Mobility (PT)  AM-PAC 6 Clicks Score (PT): 9  AM-PAC 6 Clicks Score (OT): 9  PT Discharge Summary  Anticipated Discharge Disposition (PT): skilled nursing facility    Ivelisse Lance PTA  4/5/2024

## 2024-04-05 NOTE — PROGRESS NOTES
Discharge Planning Assessment  [facility]     Patient Name: Anthony Gallegos  MRN: [id number]  Today's Date: 4/5/2024    Admit Date: 3/25/2024    Plan: [place], skilled bed pending pre-cert then [facility] medicaid bed pending. VIRI Prabhakar RN, CCP.   Discharge Needs Assessment    No documentation.                  Discharge Plan       Row Name 04/05/24 1550       Plan    Plan Comments Called Misael/With pre-certs and she has not received it yet. She will send to the weekend  to have them follow up. Maddie Portillo can accept over the weekend at [facility] if the pre-cert is obtained. VIRI Prabhakar RN CCP.                  Continued Care and Services - Admitted Since 3/25/2024       Destination       Service Provider Request Status Selected Services Address Phone Fax Patient Preferred    [facility] Accepted N/A [address] [facility] [address] [phone] --    [facility] Declined  Not eligible N/A [address] [facility] [address] [phone] --    [facility] Declined N/A [address] [address] [facility] [address] [phone] --    [facility] Declined N/A [address] [facility] [address] [phone] --    [facility] Declined N/A [address] [facility] [address] [phone] --              Home Medical Care       Service Provider Request Status Selected Services Address Phone Fax Patient Preferred    [facility] Declined  Insurance Denial N/A [address] [facility] [address] [phone] --                  Selected Continued Care - Prior Encounters Includes continued care and service providers with selected services from prior encounters from 12/26/2023 to 4/5/2024      Discharged on 1/31/2024 Admission date: 1/21/2024 - Discharge disposition: Skilled Nursing  Facility (DC - External)      Destination       Service Provider Selected Services Address Phone Fax Patient Preferred    [facility] [address] [facility] [address] [phone] --                      Discharged on 1/8/2024 Admission date: 12/20/2023 - Discharge disposition: Skilled Nursing Facility (DC - External)      Destination       Service Provider Selected Services Address Phone Fax Patient Preferred    [facility] [address] [address] [phone] --                          Expected Discharge Date and Time       Expected Discharge Date Expected Discharge Time    Apr 5, 2024            Demographic Summary    No documentation.                  Functional Status    No documentation.                  Psychosocial    No documentation.                  Abuse/Neglect    No documentation.                  Legal    No documentation.                  Substance Abuse    No documentation.                  Patient Forms    No documentation.                     Delaney Prabhakar, RN

## 2024-04-05 NOTE — PLAN OF CARE
Goal Outcome Evaluation:  Plan of Care Reviewed With: patient        Progress: declining  Outcome Evaluation: Pt was seen by PT / OT this PM for co-tx. Pt's activity tolerance is still low warranting co-tx. Pt req max A x 1 to sit up to EOB. Pt was able to move B LE toward EOB when held off mattress. Noted grimacing from pain in R knee again today when touched. Pt sat at EOB for over 5 min  w/ posterior lean requiring mostly max A to correct. Pt continually stated he was tired and wanted to return to bed. Pt returned to bed req max A x 2. PT prog as pt alexei.      Anticipated Discharge Disposition (PT): skilled nursing facility

## 2024-04-05 NOTE — PROGRESS NOTES
Name: Anthony Gallegos ADMIT: 3/25/2024   : 1945  PCP: Provider, No Known    MRN: [id number] LOS: 11 days   AGE/SEX: 79 y.o. male  ROOM: [facility]     Subjective   Subjective     No events overnight. No complaints. His RN tells me that he's still retaining and he's been straight cathed a couple more times.        Objective   Objective   Vital Signs  Temp:  [96.9 °F (36.1 °C)-98.7 °F (37.1 °C)] 96.9 °F (36.1 °C)  Heart Rate:  [82-85] 82  Resp:  [16] 16  BP: (120-145)/(67-79) 127/74  SpO2:  [91 %-100 %] 98 %  on   ;   Device (Oxygen Therapy): room air  Body mass index is 22.43 kg/m².  Physical Exam  Constitutional:       General: He is not in acute distress.     Appearance: He is ill-appearing. He is not toxic-appearing.   Cardiovascular:      Rate and Rhythm: Normal rate and regular rhythm.      Heart sounds: Normal heart sounds.   Pulmonary:      Effort: Pulmonary effort is normal.      Breath sounds: Normal breath sounds.   Abdominal:      General: Bowel sounds are normal.      Palpations: Abdomen is soft.      Comments: PEG, abdominal binder   Musculoskeletal:         General: No tenderness.      Right lower leg: No edema.      Left lower leg: No edema.   Neurological:      General: No focal deficit present.      Mental Status: He is alert and oriented to person, place, and time.      Comments: Largely nonverbal, but follows commands    Psychiatric:         Mood and Affect: Mood normal.         Behavior: Behavior normal.         Results Review     I reviewed the patient's new clinical results.  Results from last 7 days   Lab Units 24  0320 24  0355 24  0349 24  0305   WBC 10*3/mm3 7.05 7.75 10.93* 10.45   HEMOGLOBIN g/dL 10.1* 10.3* 11.1* 11.1*   PLATELETS 10*3/mm3 327 383 422 467*     Results from last 7 days   Lab Units 24  0652 24  1608 24  0529 24  1623 24  0406 24  1319 24  0320   SODIUM mmol/L 141  --  141  --  144  --  146*   POTASSIUM  mmol/L 4.0 4.3 3.6 3.8 3.6   < > 3.4*   CHLORIDE mmol/L 108*  --  110*  --  112*  --  113*   CO2 mmol/L 24.2  --  26.2  --  26.0  --  25.3   BUN mg/dL 33*  --  28*  --  34*  --  33*   CREATININE mg/dL 0.83  --  0.69*  --  0.97  --  0.99   GLUCOSE mg/dL 125*  --  116*  --  111*  --  124*    < > = values in this interval not displayed.   Estimated Creatinine Clearance: 81.7 mL/min (by C-G formula based on SCr of 0.83 mg/dL).  Results from last 7 days   Lab Units 04/05/24 0652 04/04/24 0529 04/03/24 0406 04/02/24  0320   ALBUMIN g/dL 2.4* 2.3* 2.3* 2.3*     Results from last 7 days   Lab Units 04/05/24 0652 04/04/24 0529 04/03/24 0406 04/02/24  0320   CALCIUM mg/dL 8.6 8.6 8.2* 8.5*   ALBUMIN g/dL 2.4* 2.3* 2.3* 2.3*   MAGNESIUM mg/dL 1.9 1.7 1.7 1.9   PHOSPHORUS mg/dL 3.0 2.5 3.0 2.3*           COVID19   Date Value Ref Range Status   03/25/2024 Not Detected Not Detected - Ref. Range Final     Glucose   Date/Time Value Ref Range Status   04/05/2024 1158 87 70 - 130 mg/dL Final   04/05/2024 0527 105 70 - 130 mg/dL Final   04/05/2024 0013 105 70 - 130 mg/dL Final   04/04/2024 1817 71 70 - 130 mg/dL Final   04/04/2024 1205 69 (L) 70 - 130 mg/dL Final   04/04/2024 0628 102 70 - 130 mg/dL Final   04/04/2024 0012 89 70 - 130 mg/dL Final       MRI Brain Without Contrast  Narrative: BRAIN MRI WITHOUT CONTRAST     HISTORY: Mental status change, unknown cause; R63.8-Other symptoms and  signs concerning food and fluid intake; N17.9-Acute kidney failure,  unspecified; R52-Pain, unspecified; R13.10-Dysphagia, unspecified;  E86.0-Dehydration; R79.89-Other specified abnormal findings of blood  chemistry; R62.7-Adult failure to thrive; R53.1-Weakness; R63.4-Abnormal  weight loss; N39.0-Urinary tract infection, site not specifi     COMPARISON: March 25, 2024.     FINDINGS:  Multiplanar images of the head were obtained without  gadolinium. No areas of restricted diffusion are seen to suggest acute  infarct. There is atrophy. There  is extensive periventricular and deep  white matter microangiopathic change. There is no midline shift or mass  effect. Intracranial flow voids appear intact. Old lacunar infarcts are  noted within the left basal ganglia. No abnormality is seen on gradient  echo imaging. There may be some mucosal thickening within the ethmoid  sinuses.     Impression: 1. No acute intracranial abnormality.     This report was finalized on 3/29/2024 9:26 PM by Dr. Kirsten Peña M.D on Workstation: [id number]       Scheduled Medications  bisacodyl, 10 mg, Rectal, Once  enoxaparin, 40 mg, Subcutaneous, Q24H  lansoprazole, 30 mg, Per G Tube, Q AM  Menthol-Zinc Oxide, 1 Application, Topical, Q12H  polyethylene glycol, 17 g, Per G Tube, Daily  senna-docusate sodium, 2 tablet, Per G Tube, Daily  terazosin, 1 mg, Per G Tube, Nightly    Infusions   Diet  NPO Diet NPO Type: Strict NPO       Assessment/Plan     Active Hospital Problems    Diagnosis  POA    **UTI (urinary tract infection) [N39.0]  Yes    Encephalopathy [G93.40]  Yes    Decreased oral intake [R63.8]  Yes    Dysphagia [R13.10]  Yes    MARTITA (acute kidney injury) [N17.9]  Yes    Severe malnutrition [E43]  Yes    Hypertension [I10]  Yes      Resolved Hospital Problems   No resolved problems to display.       79 y.o. male admitted with UTI (urinary tract infection).    Patient is a 78 y.o. male with a history of HTN, BPH pyelonephritis/UTI, who presented to [facility] from facility for failure to thrive, not eating or drinking much, weight loss, decreased mobility.     ESBL UTI  -completed a course of ertapenem at the direction of ID    Gross hematuria/acute urinary retention  -felt to be secondary to UTI  -hematuria is resolved  -a1 blocker  -so far his voiding trial isn't going well  -I've asked his RN to replace his forde if any PVRs are >400 cc     MARTITA/bilateral hydronephrosis  -Creatinine 2.5 on admission, up from 0.66 on 01/29/2024  -CT scan showed  mild-to-moderate bilateral hydronephrosis   -Status post Mariscal catheter placement 03/26/2024 per urology with resolution of MARTITA    Hypernatremia  -improved with increased free water flushes     Encephalopathy  -Likely secondary to UTI/dehydration/malnutrition  -No history of dementia per daughter, at baseline oriented x 3  -CT head with no acute findings  -Follow-up MRI brain 03/29/2024 showed old stroke but no acute findings  -resolved      Failure to thrive/malnutrition/decreased p.o. intake  -speech therapy evaluated, underwent VFSS 03/28/2024, found to have diffuse to be profound generalized pharyngeal weakness, silent aspiration.  Strict n.p.o. recommended  -Palliative care evaluated for goals of care discussion, family wanted to proceed with PEG tube placement.  -Status post PEG tube placement 03/29  -Continue tube feeds, tolerating     Constipation  -CT scan showed moderate gaseous distention of the colon with moderate amount of stool in the right colon and rectum.  -Patient refused suppository previously  -Status post Fleet enema 03/29, small BM afterwards  -Had multiple bowel movements overnight 03/30-03/31     Hypertension  -well controlled     Anemia of chronic disease  -Hemoglobin 10.1 on last check    GERD  -ppi    Pharyngeal secretions  -rapid strep is negative, culture negative  -ENT thinks this is non-infectious and recommends oral care  -chloraseptic spray prn    Lovenox 40 mg SC daily for DVT prophylaxis.  Full code.  Discussed with patient and nursing staff.  Anticipate discharge to SNU facility once arrangements have been made.      Gerson Townsend MD  [facility]  04/05/24  15:43 EDT    I wore protective equipment throughout this patient encounter including a face mask, gloves and protective eyewear.  Hand hygiene was performed before donning protective equipment and after removal when leaving the room.

## 2024-04-06 LAB
ALBUMIN SERPL-MCNC: 2.4 G/DL (ref 3.5–5.2)
ANION GAP SERPL CALCULATED.3IONS-SCNC: 6.1 MMOL/L (ref 5–15)
BUN SERPL-MCNC: 31 MG/DL (ref 8–23)
BUN/CREAT SERPL: 46.3 (ref 7–25)
CALCIUM SPEC-SCNC: 8.8 MG/DL (ref 8.6–10.5)
CHLORIDE SERPL-SCNC: 107 MMOL/L (ref 98–107)
CO2 SERPL-SCNC: 25.9 MMOL/L (ref 22–29)
CREAT SERPL-MCNC: 0.67 MG/DL (ref 0.76–1.27)
EGFRCR SERPLBLD CKD-EPI 2021: 94.4 ML/MIN/1.73
GLUCOSE BLDC GLUCOMTR-MCNC: 92 MG/DL (ref 70–130)
GLUCOSE BLDC GLUCOMTR-MCNC: 93 MG/DL (ref 70–130)
GLUCOSE SERPL-MCNC: 126 MG/DL (ref 65–99)
PHOSPHATE SERPL-MCNC: 3 MG/DL (ref 2.5–4.5)
POTASSIUM SERPL-SCNC: 3.8 MMOL/L (ref 3.5–5.2)
SODIUM SERPL-SCNC: 139 MMOL/L (ref 136–145)

## 2024-04-06 PROCEDURE — 82948 REAGENT STRIP/BLOOD GLUCOSE: CPT

## 2024-04-06 PROCEDURE — 25010000002 ENOXAPARIN PER 10 MG: Performed by: STUDENT IN AN ORGANIZED HEALTH CARE EDUCATION/TRAINING PROGRAM

## 2024-04-06 PROCEDURE — 80069 RENAL FUNCTION PANEL: CPT | Performed by: STUDENT IN AN ORGANIZED HEALTH CARE EDUCATION/TRAINING PROGRAM

## 2024-04-06 RX ADMIN — POLYETHYLENE GLYCOL 3350 17 G: 17 POWDER, FOR SOLUTION ORAL at 09:18

## 2024-04-06 RX ADMIN — ACETAMINOPHEN 325MG 650 MG: 325 TABLET ORAL at 17:42

## 2024-04-06 RX ADMIN — DOCUSATE SODIUM 50MG AND SENNOSIDES 8.6MG 2 TABLET: 8.6; 5 TABLET, FILM COATED ORAL at 09:17

## 2024-04-06 RX ADMIN — LANSOPRAZOLE 30 MG: 15 TABLET, ORALLY DISINTEGRATING ORAL at 09:18

## 2024-04-06 RX ADMIN — ANORECTAL OINTMENT 1 APPLICATION: 15.7; .44; 24; 20.6 OINTMENT TOPICAL at 09:18

## 2024-04-06 RX ADMIN — ENOXAPARIN SODIUM 40 MG: 100 INJECTION SUBCUTANEOUS at 17:42

## 2024-04-06 RX ADMIN — ANORECTAL OINTMENT 1 APPLICATION: 15.7; .44; 24; 20.6 OINTMENT TOPICAL at 21:22

## 2024-04-06 NOTE — CASE MANAGEMENT/SOCIAL WORK
Continued Stay Note  [facility]     Patient Name: Anthony Gallegos  MRN: [id number]  Today's Date: 4/6/2024    Admit Date: 3/25/2024    Plan: [place], skilled bed pending pre-cert then LTC medicaid bed pending. VIRI Prabhakar RN, CCP.   Discharge Plan       Row Name 04/06/24 1158       Plan    Plan Comments Patient's pre cert to [facility] SNF remains pending. MD aware. Indy HATCH RN CCP                   Discharge Codes    No documentation.                 Expected Discharge Date and Time       Expected Discharge Date Expected Discharge Time    Apr 5, 2024               Indy Bennett RN

## 2024-04-06 NOTE — PLAN OF CARE
Goal Outcome Evaluation:     Calm and cooperative this shift, PRN tylenol 650 mg per GT given r/t back pain, no distress noted

## 2024-04-06 NOTE — PROGRESS NOTES
Name: Anthony Gallegos ADMIT: 3/25/2024   : 1945  PCP: Provider, No Known    MRN: [id number] LOS: 12 days   AGE/SEX: 79 y.o. male  ROOM: [facility]     Subjective   Subjective     No events overnight. No complaints. Precertification is still pending       Objective   Objective   Vital Signs  Temp:  [96.9 °F (36.1 °C)-97.4 °F (36.3 °C)] 97.4 °F (36.3 °C)  Heart Rate:  [75-82] 75  Resp:  [16] 16  BP: (101-127)/(59-74) 101/59  SpO2:  [98 %-99 %] 99 %  on   ;   Device (Oxygen Therapy): room air  Body mass index is 22.04 kg/m².  Physical Exam  Constitutional:       General: He is not in acute distress.     Appearance: He is ill-appearing. He is not toxic-appearing.   Cardiovascular:      Rate and Rhythm: Normal rate and regular rhythm.      Heart sounds: Normal heart sounds.   Pulmonary:      Effort: Pulmonary effort is normal.      Breath sounds: Normal breath sounds.   Abdominal:      General: Bowel sounds are normal.      Palpations: Abdomen is soft.      Comments: PEG, abdominal binder   Musculoskeletal:         General: No tenderness.      Right lower leg: No edema.      Left lower leg: No edema.   Neurological:      General: No focal deficit present.      Mental Status: He is alert and oriented to person, place, and time.   Psychiatric:         Mood and Affect: Mood normal.         Behavior: Behavior normal.         Results Review     I reviewed the patient's new clinical results.  Results from last 7 days   Lab Units 24  0320 24  0355 24  0349   WBC 10*3/mm3 7.05 7.75 10.93*   HEMOGLOBIN g/dL 10.1* 10.3* 11.1*   PLATELETS 10*3/mm3 327 383 422     Results from last 7 days   Lab Units 24  0501 24  0652 24  1608 24  0529 24  1623 24  0406   SODIUM mmol/L 139 141  --  141  --  144   POTASSIUM mmol/L 3.8 4.0 4.3 3.6   < > 3.6   CHLORIDE mmol/L 107 108*  --  110*  --  112*   CO2 mmol/L 25.9 24.2  --  26.2  --  26.0   BUN mg/dL 31* 33*  --  28*  --  34*    CREATININE mg/dL 0.67* 0.83  --  0.69*  --  0.97   GLUCOSE mg/dL 126* 125*  --  116*  --  111*    < > = values in this interval not displayed.   Estimated Creatinine Clearance: 99.5 mL/min (A) (by C-G formula based on SCr of 0.67 mg/dL (L)).  Results from last 7 days   Lab Units 04/06/24  0501 04/05/24  0652 04/04/24  0529 04/03/24  0406   ALBUMIN g/dL 2.4* 2.4* 2.3* 2.3*     Results from last 7 days   Lab Units 04/06/24  0501 04/05/24  0652 04/04/24  0529 04/03/24  0406 04/02/24  0320   CALCIUM mg/dL 8.8 8.6 8.6 8.2* 8.5*   ALBUMIN g/dL 2.4* 2.4* 2.3* 2.3* 2.3*   MAGNESIUM mg/dL  --  1.9 1.7 1.7 1.9   PHOSPHORUS mg/dL 3.0 3.0 2.5 3.0 2.3*           COVID19   Date Value Ref Range Status   03/25/2024 Not Detected Not Detected - Ref. Range Final     Glucose   Date/Time Value Ref Range Status   04/06/2024 1156 93 70 - 130 mg/dL Final   04/05/2024 1817 83 70 - 130 mg/dL Final   04/05/2024 1158 87 70 - 130 mg/dL Final   04/05/2024 0527 105 70 - 130 mg/dL Final   04/05/2024 0013 105 70 - 130 mg/dL Final   04/04/2024 1817 71 70 - 130 mg/dL Final   04/04/2024 1205 69 (L) 70 - 130 mg/dL Final       MRI Brain Without Contrast  Narrative: BRAIN MRI WITHOUT CONTRAST     HISTORY: Mental status change, unknown cause; R63.8-Other symptoms and  signs concerning food and fluid intake; N17.9-Acute kidney failure,  unspecified; R52-Pain, unspecified; R13.10-Dysphagia, unspecified;  E86.0-Dehydration; R79.89-Other specified abnormal findings of blood  chemistry; R62.7-Adult failure to thrive; R53.1-Weakness; R63.4-Abnormal  weight loss; N39.0-Urinary tract infection, site not specifi     COMPARISON: March 25, 2024.     FINDINGS:  Multiplanar images of the head were obtained without  gadolinium. No areas of restricted diffusion are seen to suggest acute  infarct. There is atrophy. There is extensive periventricular and deep  white matter microangiopathic change. There is no midline shift or mass  effect. Intracranial flow voids appear  intact. Old lacunar infarcts are  noted within the left basal ganglia. No abnormality is seen on gradient  echo imaging. There may be some mucosal thickening within the ethmoid  sinuses.     Impression: 1. No acute intracranial abnormality.     This report was finalized on 3/29/2024 9:26 PM by Dr. Kirsten Peña M.D on Workstation: [id number]       Scheduled Medications  bisacodyl, 10 mg, Rectal, Once  enoxaparin, 40 mg, Subcutaneous, Q24H  lansoprazole, 30 mg, Per G Tube, Q AM  Menthol-Zinc Oxide, 1 Application, Topical, Q12H  polyethylene glycol, 17 g, Per G Tube, Daily  senna-docusate sodium, 2 tablet, Per G Tube, Daily  terazosin, 1 mg, Per G Tube, Nightly    Infusions   Diet  NPO Diet NPO Type: Tube Feeding       Assessment/Plan     Active Hospital Problems    Diagnosis  POA    **UTI (urinary tract infection) [N39.0]  Yes    Encephalopathy [G93.40]  Yes    Decreased oral intake [R63.8]  Yes    Dysphagia [R13.10]  Yes    MARTITA (acute kidney injury) [N17.9]  Yes    Severe malnutrition [E43]  Yes    Hypertension [I10]  Yes      Resolved Hospital Problems   No resolved problems to display.       79 y.o. male admitted with UTI (urinary tract infection).    Patient is a 78 y.o. male with a history of HTN, BPH pyelonephritis/UTI, who presented to [facility] from facility for failure to thrive, not eating or drinking much, weight loss, decreased mobility.     ESBL UTI  -completed a course of ertapenem at the direction of ID    Gross hematuria/acute urinary retention  -felt to be secondary to UTI  -hematuria is resolved  -a1 blocker  -he failed his voiding trial and his forde had to be replaced  -he will need to follow up with urology as an outpatient      MARTITA/bilateral hydronephrosis  -Creatinine 2.5 on admission, up from 0.66 on 01/29/2024  -CT scan showed mild-to-moderate bilateral hydronephrosis   -Status post Forde catheter placement 03/26/2024 per urology with resolution of  MARTITA    Hypernatremia  -improved with increased free water flushes     Encephalopathy  -Likely secondary to UTI/dehydration/malnutrition  -No history of dementia per daughter, at baseline oriented x 3  -CT head with no acute findings  -Follow-up MRI brain 03/29/2024 showed old stroke but no acute findings  -resolved      Failure to thrive/malnutrition/decreased p.o. intake  -speech therapy evaluated, underwent VFSS 03/28/2024, found to have diffuse to be profound generalized pharyngeal weakness, silent aspiration.  Strict n.p.o. recommended  -Palliative care evaluated for goals of care discussion, family wanted to proceed with PEG tube placement.  -Status post PEG tube placement 03/29  -Continue tube feeds, tolerating     Constipation  -CT scan showed moderate gaseous distention of the colon with moderate amount of stool in the right colon and rectum.  -Patient refused suppository previously  -Status post Fleet enema 03/29, small BM afterwards  -Had multiple bowel movements overnight 03/30-03/31     Hypertension  -well controlled     Anemia of chronic disease  -Hemoglobin 10.1 on last check    GERD  -ppi    Pharyngeal secretions  -rapid strep is negative, culture negative  -ENT thinks this is non-infectious and recommends oral care  -chloraseptic spray prn    Space labs to once weekly    Lovenox 40 mg SC daily for DVT prophylaxis.  Full code.  Discussed with patient and CCP.  Anticipate discharge to SNU facility once precert has been obtained.      Gerson Townsend MD  [facility]  04/06/24  12:01 EDT    I wore protective equipment throughout this patient encounter including a face mask, gloves and protective eyewear.  Hand hygiene was performed before donning protective equipment and after removal when leaving the room.

## 2024-04-07 LAB
GLUCOSE BLDC GLUCOMTR-MCNC: 87 MG/DL (ref 70–130)
GLUCOSE BLDC GLUCOMTR-MCNC: 93 MG/DL (ref 70–130)
GLUCOSE BLDC GLUCOMTR-MCNC: 95 MG/DL (ref 70–130)
GLUCOSE BLDC GLUCOMTR-MCNC: 98 MG/DL (ref 70–130)

## 2024-04-07 PROCEDURE — 25010000002 ENOXAPARIN PER 10 MG: Performed by: STUDENT IN AN ORGANIZED HEALTH CARE EDUCATION/TRAINING PROGRAM

## 2024-04-07 PROCEDURE — 82948 REAGENT STRIP/BLOOD GLUCOSE: CPT

## 2024-04-07 RX ADMIN — ENOXAPARIN SODIUM 40 MG: 100 INJECTION SUBCUTANEOUS at 18:14

## 2024-04-07 RX ADMIN — DOCUSATE SODIUM 50MG AND SENNOSIDES 8.6MG 2 TABLET: 8.6; 5 TABLET, FILM COATED ORAL at 09:27

## 2024-04-07 RX ADMIN — ANORECTAL OINTMENT 1 APPLICATION: 15.7; .44; 24; 20.6 OINTMENT TOPICAL at 22:00

## 2024-04-07 RX ADMIN — ANORECTAL OINTMENT 1 APPLICATION: 15.7; .44; 24; 20.6 OINTMENT TOPICAL at 09:27

## 2024-04-07 RX ADMIN — TERAZOSIN HYDROCHLORIDE 1 MG: 1 CAPSULE ORAL at 22:22

## 2024-04-07 RX ADMIN — LANSOPRAZOLE 30 MG: 15 TABLET, ORALLY DISINTEGRATING ORAL at 06:04

## 2024-04-07 RX ADMIN — POLYETHYLENE GLYCOL 3350 17 G: 17 POWDER, FOR SOLUTION ORAL at 09:27

## 2024-04-07 NOTE — PLAN OF CARE
Goal Outcome Evaluation:   VSS. A&Ox4 with mild confusion at times. Able to verbalize needs. No complaints of pain overnight. Bariatric bed. NPO. Mariscal.Tube feeds continued per orders. Q6 glucose checks. Turned Q2. Discharge to SNF pending precert. Care continuing.

## 2024-04-07 NOTE — PROGRESS NOTES
Name: Anthony Gallegos ADMIT: 3/25/2024   : 1945  PCP: Provider, No Known    MRN: [id number] LOS: 13 days   AGE/SEX: 79 y.o. male  ROOM: [facility]     Subjective   Subjective     No events overnight. No complaints. Precertification is still pending       Objective   Objective   Vital Signs  Temp:  [97.2 °F (36.2 °C)-97.7 °F (36.5 °C)] 97.5 °F (36.4 °C)  Heart Rate:  [68-77] 68  Resp:  [16-18] 18  BP: (106-123)/(56-73) 106/56  SpO2:  [98 %-100 %] 98 %  on   ;   Device (Oxygen Therapy): room air  Body mass index is 22.32 kg/m².  Physical Exam  Constitutional:       General: He is not in acute distress.     Appearance: He is ill-appearing. He is not toxic-appearing.   Cardiovascular:      Rate and Rhythm: Normal rate and regular rhythm.      Heart sounds: Normal heart sounds.   Pulmonary:      Effort: Pulmonary effort is normal.      Breath sounds: Normal breath sounds.   Abdominal:      General: Bowel sounds are normal.      Palpations: Abdomen is soft.      Comments: PEG, abdominal binder   Musculoskeletal:         General: No tenderness.      Right lower leg: No edema.      Left lower leg: No edema.   Neurological:      General: No focal deficit present.      Mental Status: He is alert and oriented to person, place, and time.   Psychiatric:         Mood and Affect: Mood normal.         Behavior: Behavior normal.         Results Review     I reviewed the patient's new clinical results.  Results from last 7 days   Lab Units 24  0320 24  0355   WBC 10*3/mm3 7.05 7.75   HEMOGLOBIN g/dL 10.1* 10.3*   PLATELETS 10*3/mm3 327 383     Results from last 7 days   Lab Units 24  0501 24  0652 24  1608 24  0529 24  1623 24  0406   SODIUM mmol/L 139 141  --  141  --  144   POTASSIUM mmol/L 3.8 4.0 4.3 3.6   < > 3.6   CHLORIDE mmol/L 107 108*  --  110*  --  112*   CO2 mmol/L 25.9 24.2  --  26.2  --  26.0   BUN mg/dL 31* 33*  --  28*  --  34*   CREATININE mg/dL 0.67* 0.83  --   0.69*  --  0.97   GLUCOSE mg/dL 126* 125*  --  116*  --  111*    < > = values in this interval not displayed.   Estimated Creatinine Clearance: 100.7 mL/min (A) (by C-G formula based on SCr of 0.67 mg/dL (L)).  Results from last 7 days   Lab Units 04/06/24  0501 04/05/24  0652 04/04/24  0529 04/03/24  0406   ALBUMIN g/dL 2.4* 2.4* 2.3* 2.3*     Results from last 7 days   Lab Units 04/06/24  0501 04/05/24  0652 04/04/24  0529 04/03/24  0406 04/02/24  0320   CALCIUM mg/dL 8.8 8.6 8.6 8.2* 8.5*   ALBUMIN g/dL 2.4* 2.4* 2.3* 2.3* 2.3*   MAGNESIUM mg/dL  --  1.9 1.7 1.7 1.9   PHOSPHORUS mg/dL 3.0 3.0 2.5 3.0 2.3*           COVID19   Date Value Ref Range Status   03/25/2024 Not Detected Not Detected - Ref. Range Final     Glucose   Date/Time Value Ref Range Status   04/07/2024 0557 95 70 - 130 mg/dL Final   04/07/2024 0002 98 70 - 130 mg/dL Final   04/06/2024 1851 92 70 - 130 mg/dL Final   04/06/2024 1156 93 70 - 130 mg/dL Final   04/05/2024 1817 83 70 - 130 mg/dL Final   04/05/2024 1158 87 70 - 130 mg/dL Final   04/05/2024 0527 105 70 - 130 mg/dL Final       MRI Brain Without Contrast  Narrative: BRAIN MRI WITHOUT CONTRAST     HISTORY: Mental status change, unknown cause; R63.8-Other symptoms and  signs concerning food and fluid intake; N17.9-Acute kidney failure,  unspecified; R52-Pain, unspecified; R13.10-Dysphagia, unspecified;  E86.0-Dehydration; R79.89-Other specified abnormal findings of blood  chemistry; R62.7-Adult failure to thrive; R53.1-Weakness; R63.4-Abnormal  weight loss; N39.0-Urinary tract infection, site not specifi     COMPARISON: March 25, 2024.     FINDINGS:  Multiplanar images of the head were obtained without  gadolinium. No areas of restricted diffusion are seen to suggest acute  infarct. There is atrophy. There is extensive periventricular and deep  white matter microangiopathic change. There is no midline shift or mass  effect. Intracranial flow voids appear intact. Old lacunar infarcts  are  noted within the left basal ganglia. No abnormality is seen on gradient  echo imaging. There may be some mucosal thickening within the ethmoid  sinuses.     Impression: 1. No acute intracranial abnormality.     This report was finalized on 3/29/2024 9:26 PM by Dr. Kirsten Peña M.D on Workstation: [id number]       Scheduled Medications  bisacodyl, 10 mg, Rectal, Once  enoxaparin, 40 mg, Subcutaneous, Q24H  lansoprazole, 30 mg, Per G Tube, Q AM  Menthol-Zinc Oxide, 1 Application, Topical, Q12H  polyethylene glycol, 17 g, Per G Tube, Daily  senna-docusate sodium, 2 tablet, Per G Tube, Daily  terazosin, 1 mg, Per G Tube, Nightly    Infusions   Diet  NPO Diet NPO Type: Tube Feeding       Assessment/Plan     Active Hospital Problems    Diagnosis  POA    **UTI (urinary tract infection) [N39.0]  Yes    Encephalopathy [G93.40]  Yes    Decreased oral intake [R63.8]  Yes    Dysphagia [R13.10]  Yes    MARTITA (acute kidney injury) [N17.9]  Yes    Severe malnutrition [E43]  Yes    Hypertension [I10]  Yes      Resolved Hospital Problems   No resolved problems to display.       79 y.o. male admitted with UTI (urinary tract infection).    Patient is a 78 y.o. male with a history of HTN, BPH pyelonephritis/UTI, who presented to [facility] from facility for failure to thrive, not eating or drinking much, weight loss, decreased mobility.     ESBL UTI  -completed a course of ertapenem at the direction of ID    Gross hematuria/acute urinary retention  -felt to be secondary to UTI  -hematuria is resolved  -a1 blocker  -he failed his voiding trial and his forde had to be replaced  -he will need to follow up with urology as an outpatient      MARTITA/bilateral hydronephrosis  -Creatinine 2.5 on admission, up from 0.66 on 01/29/2024  -CT scan showed mild-to-moderate bilateral hydronephrosis   -Status post Forde catheter placement 03/26/2024 per urology with resolution of MARTITA    Hypernatremia  -improved with increased  free water flushes     Encephalopathy  -Likely secondary to UTI/dehydration/malnutrition  -No history of dementia per daughter, at baseline oriented x 3  -CT head with no acute findings  -Follow-up MRI brain 03/29/2024 showed old stroke but no acute findings  -resolved      Failure to thrive/malnutrition/decreased p.o. intake  -speech therapy evaluated, underwent VFSS 03/28/2024, found to have diffuse to be profound generalized pharyngeal weakness, silent aspiration.  Strict n.p.o. recommended  -Palliative care evaluated for goals of care discussion, family wanted to proceed with PEG tube placement.  -Status post PEG tube placement 03/29  -Continue tube feeds, tolerating     Constipation  -CT scan showed moderate gaseous distention of the colon with moderate amount of stool in the right colon and rectum.  -Patient refused suppository previously  -Status post Fleet enema 03/29, small BM afterwards  -Had multiple bowel movements overnight 03/30-03/31  -resolved     Hypertension  -well controlled     Anemia of chronic disease  -Hemoglobin 10.1 on last check    GERD  -ppi    Weekly labs until discharge    Lovenox 40 mg SC daily for DVT prophylaxis.  Full code.  Discussed with patient and nursing staff.  Anticipate discharge to SNU facility once precert has been obtained.      Gerson Townsend MD  [facility]  04/07/24  11:17 EDT    I wore protective equipment throughout this patient encounter including a face mask, gloves and protective eyewear.  Hand hygiene was performed before donning protective equipment and after removal when leaving the room.

## 2024-04-07 NOTE — PLAN OF CARE
Problem: Adult Inpatient Plan of Care  Goal: Plan of Care Review  Outcome: Ongoing, Progressing  Flowsheets (Taken 4/7/2024 1806)  Progress: no change  Plan of Care Reviewed With:   patient   family  Outcome Evaluation: patient calm and comfortable, no complaints of pain. patient placed on a new speciality bed. NPO with Forde.Tube feeds continued per orders. Q6 glucose checks. Turned Q2 waiting on percert from SNF. will continue to treat per MD     Problem: Adult Inpatient Plan of Care  Goal: Patient-Specific Goal (Individualized)  Outcome: Ongoing, Progressing  Flowsheets (Taken 4/7/2024 1806)  Patient-Specific Goals (Include Timeframe): to feel better, waiting for SNF  Individualized Care Needs: PRN and scheduled meds, tube feeds, forde catheter care

## 2024-04-08 LAB
GLUCOSE BLDC GLUCOMTR-MCNC: 100 MG/DL (ref 70–130)
GLUCOSE BLDC GLUCOMTR-MCNC: 105 MG/DL (ref 70–130)
GLUCOSE BLDC GLUCOMTR-MCNC: 127 MG/DL (ref 70–130)
GLUCOSE BLDC GLUCOMTR-MCNC: 86 MG/DL (ref 70–130)
GLUCOSE BLDC GLUCOMTR-MCNC: 90 MG/DL (ref 70–130)

## 2024-04-08 PROCEDURE — 97112 NEUROMUSCULAR REEDUCATION: CPT

## 2024-04-08 PROCEDURE — 25010000002 ENOXAPARIN PER 10 MG: Performed by: STUDENT IN AN ORGANIZED HEALTH CARE EDUCATION/TRAINING PROGRAM

## 2024-04-08 PROCEDURE — 97530 THERAPEUTIC ACTIVITIES: CPT

## 2024-04-08 PROCEDURE — 82948 REAGENT STRIP/BLOOD GLUCOSE: CPT

## 2024-04-08 PROCEDURE — 97110 THERAPEUTIC EXERCISES: CPT

## 2024-04-08 RX ADMIN — DOCUSATE SODIUM 50MG AND SENNOSIDES 8.6MG 2 TABLET: 8.6; 5 TABLET, FILM COATED ORAL at 08:14

## 2024-04-08 RX ADMIN — TERAZOSIN HYDROCHLORIDE 1 MG: 1 CAPSULE ORAL at 22:00

## 2024-04-08 RX ADMIN — ANORECTAL OINTMENT 1 APPLICATION: 15.7; .44; 24; 20.6 OINTMENT TOPICAL at 08:16

## 2024-04-08 RX ADMIN — ANORECTAL OINTMENT 1 APPLICATION: 15.7; .44; 24; 20.6 OINTMENT TOPICAL at 22:00

## 2024-04-08 RX ADMIN — LANSOPRAZOLE 30 MG: 15 TABLET, ORALLY DISINTEGRATING ORAL at 05:57

## 2024-04-08 RX ADMIN — ACETAMINOPHEN 325MG 650 MG: 325 TABLET ORAL at 08:14

## 2024-04-08 RX ADMIN — POLYETHYLENE GLYCOL 3350 17 G: 17 POWDER, FOR SOLUTION ORAL at 08:15

## 2024-04-08 RX ADMIN — ENOXAPARIN SODIUM 40 MG: 100 INJECTION SUBCUTANEOUS at 17:00

## 2024-04-08 NOTE — PLAN OF CARE
Goal Outcome Evaluation:  Plan of Care Reviewed With: patient        Progress: no change  Outcome Evaluation: VSS. Patient alert and oriented x 3. NPO. Peg tube in place and receiving Novasource renal at 50mL/hr with tap water flush at 50mL/hr. Tolerating well. Contact precautions maintained. Mariscal catheter in place with catheter care performed. Room air. Pt is on specialty bed and turned every 2 hours Blood sugar checks every 6 hours. Continue plan of care.

## 2024-04-08 NOTE — PLAN OF CARE
Goal Outcome Evaluation:  Plan of Care Reviewed With: (P) patient        Progress: (P) no change  Outcome Evaluation: (P) Pt greeted in supine, agreeable to PT treatment this PM. Demonstrates reduced activity tolerance this date. Completes sup to sit with modA x1, STS attempts x2, and sit to sup with maxA x2. Demonstrates significant posterior lean throughout session in sitting and standing, unable to correct with cueing. Refused seated LE therex, but willing to complete supine LE therex this date. Left pt in supine with needs met. PT will continue to follow to address functional deficits.      Anticipated Discharge Disposition (PT): (P) skilled nursing facility

## 2024-04-08 NOTE — PROGRESS NOTES
"Nutrition Services    Patient Name:  Anthony Gallegos  YOB: 1945  MRN: [id number]  Admit Date:  3/25/2024    Assessment Date:  04/08/24    Comments: TF follow up    Tolerating TF well. NS renal infusing at 50mL/hr goal rate. FWF 50 mL q 1 hr. Per MD, infection appears resolved. Pt failed his voiding trial and forde had to be replaced on 4/6. DC to SNF pending pre certification. Q6 glucose checks. Continue POC. Will continue to monitor flushes and Na labs.    Labs: Na 139, Glu 87/127/105, BUN 31, Creat 0.67  Meds: Miralax, pericolace  Last BM: 4/5    Recommendations:  Continue NS renal at 50mL/hr goal rate.   Change FWF to 50 mL q 1 hr water flushes   Monitor Na+ lab.  Monitor swallow function    RD to follow tolerance, labs and clinical course.     CLINICAL NUTRITION ASSESSMENT      Reason for Assessment Follow-up Protocol     Diagnosis/Problem   UTI   Medical/Surgical History Past Medical History:   Diagnosis Date    Abscess of scrotum     MARTITA (acute kidney injury)     Altered mental state     Arthritis     AS (ankylosing spondylitis)     History of MRSA infection     Hypertension     Leukocytosis     Tetrahydrocannabinol (THC) use disorder, mild, abuse 12/20/2023    Uveitis        Past Surgical History:   Procedure Laterality Date    COLONOSCOPY      ENDOSCOPY W/ PEG TUBE PLACEMENT N/A 3/29/2024    Procedure: ESOPHAGOGASTRODUODENOSCOPY WITH PERCUTANEOUS ENDOSCOPIC GASTROSTOMY TUBE INSERTION;  Surgeon: Khadar Broderick MD;  Location: [facility] ENDOSCOPY;  Service: General;  Laterality: N/A;  PRE/POST - DYSPHAGIA    ROTATOR CUFF REPAIR Right     TOTAL HIP ARTHROPLASTY Left 2014        Anthropometrics        Current Height  Current Weight  BMI kg/m2 Height: 190.5 cm (75\")  Weight: 90.5 kg (199 lb 8.3 oz) (04/08/24 0420)  Body mass index is 24.94 kg/m².   Adjusted BMI (if applicable)    BMI Category Normal/Healthy (18.4 - 24.9)   Ideal Body Weight (IBW) 196 lb (89.1 kg)   Usual Body Weight (UBW) " 152-188 lb   Weight Trend Loss, Amount/Timeframe: 36 lb (19.1%) x 2 months   Weight History Wt Readings from Last 30 Encounters:   04/08/24 0420 90.5 kg (199 lb 8.3 oz)   04/07/24 0600 81 kg (178 lb 9.2 oz)   04/06/24 0500 80 kg (176 lb 5.9 oz)   04/03/24 0521 81.4 kg (179 lb 7.3 oz)   04/02/24 0435 80.4 kg (177 lb 4 oz)   04/01/24 0420 80.9 kg (178 lb 5.6 oz)   03/31/24 0530 82.7 kg (182 lb 6.4 oz)   03/30/24 0557 82.2 kg (181 lb 3.5 oz)   03/29/24 0548 82.6 kg (182 lb 1.6 oz)   03/28/24 0500 76.9 kg (169 lb 8 oz)   03/27/24 0617 76.4 kg (168 lb 6.4 oz)   03/25/24 2123 69.1 kg (152 lb 6.4 oz)   03/25/24 1516 79 kg (174 lb 2.6 oz)   01/31/24 0639 79 kg (174 lb 3.2 oz)   01/29/24 0509 78.8 kg (173 lb 11.6 oz)   01/28/24 0530 77.7 kg (171 lb 4.8 oz)   01/25/24 0537 76.4 kg (168 lb 6.9 oz)   01/23/24 0542 73.2 kg (161 lb 6 oz)   01/22/24 0502 75.1 kg (165 lb 9.1 oz)   01/21/24 2304 75.5 kg (166 lb 7.2 oz)   01/21/24 1744 81.6 kg (180 lb)   01/08/24 0253 81.6 kg (179 lb 14.3 oz)   01/05/24 0440 87.9 kg (193 lb 12.6 oz)   01/04/24 0439 85.6 kg (188 lb 11.4 oz)   01/03/24 0513 82.5 kg (181 lb 14.1 oz)   12/31/23 1300 80.5 kg (177 lb 7.5 oz)   12/31/23 0350 83.8 kg (184 lb 11.9 oz)   12/30/23 0511 85.3 kg (188 lb 0.8 oz)   12/29/23 0341 85.7 kg (188 lb 15 oz)   12/28/23 0435 85.4 kg (188 lb 4.4 oz)   12/27/23 0755 81 kg (178 lb 9.2 oz)   12/27/23 0423 85.4 kg (188 lb 4.4 oz)   12/25/23 0707 85.2 kg (187 lb 13.3 oz)   12/23/23 0608 81.3 kg (179 lb 3.7 oz)   12/22/23 0430 81.8 kg (180 lb 5.4 oz)   12/20/23 1005 81.9 kg (180 lb 8 oz)   03/27/18 1439 100 kg (221 lb)   03/22/18 1613 101 kg (222 lb 12.8 oz)   01/23/18 1414 101 kg (223 lb)   12/26/17 1111 99.8 kg (220 lb)   10/19/17 1239 95.3 kg (210 lb)   08/30/17 1459 91.3 kg (201 lb 3.2 oz)   08/29/17 1221 93 kg (205 lb)   08/10/17 1517 88 kg (194 lb)   07/14/17 1041 85.9 kg (189 lb 6.4 oz)   06/29/17 1318 84.5 kg (186 lb 3.2 oz)   06/21/17 1922 90.7 kg (200 lb)       --  Estimated/Assessed Needs        Current Weight  Weight: 90.5 kg (199 lb 8.3 oz) (04/08/24 0420)       Energy Requirements    Weight for Calculation 152 lb (69.1 kg)   Method for Estimation  30-35 kcal/kg   EST Needs (kcal/day) 0743-2132       Protein Requirements    Weight for Calculation 152 lb (69.1 kg)   EST Protein Needs (g/kg) 1.2 - 1.5 gm/kg   EST Daily Needs (g/day)        Fluid Requirements     Method for Estimation 1 mL/kcal    EST Needs (mL/day)      Labs       Pertinent Labs    Results from last 7 days   Lab Units 04/06/24  0501 04/05/24  0652 04/04/24  1608 04/04/24  0529   SODIUM mmol/L 139 141  --  141   POTASSIUM mmol/L 3.8 4.0 4.3 3.6   CHLORIDE mmol/L 107 108*  --  110*   CO2 mmol/L 25.9 24.2  --  26.2   BUN mg/dL 31* 33*  --  28*   CREATININE mg/dL 0.67* 0.83  --  0.69*   CALCIUM mg/dL 8.8 8.6  --  8.6   GLUCOSE mg/dL 126* 125*  --  116*     Results from last 7 days   Lab Units 04/06/24  0501 04/05/24  0652 04/04/24  0529 04/03/24  0406 04/02/24  0320   MAGNESIUM mg/dL  --  1.9 1.7 1.7 1.9   PHOSPHORUS mg/dL 3.0 3.0 2.5 3.0 2.3*   HEMOGLOBIN g/dL  --   --   --   --  10.1*   HEMATOCRIT %  --   --   --   --  32.0*   WBC 10*3/mm3  --   --   --   --  7.05   ALBUMIN g/dL 2.4* 2.4* 2.3* 2.3* 2.3*     Results from last 7 days   Lab Units 04/02/24  0320   PLATELETS 10*3/mm3 327     COVID19   Date Value Ref Range Status   03/25/2024 Not Detected Not Detected - Ref. Range Final     Lab Results   Component Value Date    HGBA1C 5.80 (H) 01/23/2024          Medications           Scheduled Medications bisacodyl, 10 mg, Rectal, Once  enoxaparin, 40 mg, Subcutaneous, Q24H  lansoprazole, 30 mg, Per G Tube, Q AM  Menthol-Zinc Oxide, 1 Application, Topical, Q12H  polyethylene glycol, 17 g, Per G Tube, Daily  senna-docusate sodium, 2 tablet, Per G Tube, Daily  terazosin, 1 mg, Per G Tube, Nightly       Infusions       PRN Medications   acetaminophen    senna-docusate sodium **AND** [DISCONTINUED]  polyethylene glycol **AND** bisacodyl **AND** bisacodyl    dextrose    dextrose    glucagon (human recombinant)    melatonin    ondansetron ODT **OR** ondansetron    phenol    Potassium Replacement - Follow Nurse / BPA Driven Protocol    [COMPLETED] Insert Peripheral IV **AND** sodium chloride     Physical Findings          General Findings alert, disoriented, loss of muscle mass, loss of subcutaneous fat, room air   Oral/Mouth Cavity tooth or teeth missing   Edema  1+ (trace)   Gastrointestinal fecal incontinence, normoactive, last bowel movement: 4/5   Skin  bruising, pressure injury: st II coccyx, other: abrasion   Tubes/Drains/Lines PEG, Urethral Catheter   NFPE See Malnutrition Severity Assessment, Date Completed: 3/26   --  Malnutrition Severity Assessment      Patient meets criteria for : Severe Malnutrition         Current Nutrition Orders & Evaluation of Intake       Oral Nutrition     Food Allergies NKFA   Current PO Diet NPO Diet NPO Type: Tube Feeding   Supplement n/a   PO Evaluation     % PO Intake N/a    Factors Affecting Intake: swallow impairment      Enteral Nutrition     Enteral Route PEG    TF Delivery Method Continuous    Propofol Rate/Kcal     Current TF Order/Rate  Novasource Renal @ 50 mL/hr    TF Goal Rate 50 mL/hr    Current Water Flush 50 mL/hr q 1 hr    Modular None    TF Residual  no or minimal residual    TF Tolerance tolerating    TF Observation Verified correct TF and water flush infusing per orders     PES STATEMENT / NUTRITION DIAGNOSIS      Nutrition Dx Problem  Problem: Malnutrition (severe)  Etiology: Factors Affecting Nutrition - poor PO, weakness, decreased mobility    Signs/Symptoms: NPO, Report of Minimal PO Intake, NFPE Results, Unintended Weight Change, and Report/Observation     NUTRITION INTERVENTION / PLAN OF CARE      Intervention Goal(s) Maintain nutrition status, Reduce/improve symptoms, Meet estimated needs, Disease management/therapy, Maintain TF/PN, and Appropriate  weight gain         RD Intervention/Action Continue to monitor and Care plan reviewed     Nutrition Prescription        Enteral Prescription:     Enteral Route PEG    TF Delivery Method Continuous    Enteral Product Novasource Renal    Modular None    Propofol Rate/Kcal     TF Start Rate/Volume  20 mL    TF Goal Rate/Volume 50 mL     Free Water Flush 50 mL q 1 hr    TF Provision at Goal:          Calories 2200 kcal, meets 100 % needs         Protein  100 gm protein, meets 100 % needs         Fluid (mL) 50 mL/hr    Prescription Ordered No, recommended     --      Monitor/Evaluation Per protocol   Discharge Plan/Needs Pending clinical course   --    RD to follow per protocol.      Electronically signed by:  Mindy Rowe  04/08/24 10:32 EDT

## 2024-04-08 NOTE — PLAN OF CARE
Goal Outcome Evaluation:  Plan of Care Reviewed With: patient        Progress: no change  Outcome Evaluation: pt seen for OT this date, agreeable to therapy. He requires mod-max A for bed mobility this date, increased time to complete, at best able to sit at EOB unsupported with SBA. Functional reaching task completed with CGA for sit balance as pt able to reach across midline and outside base of support. He remains quick to fatigue with increased posterior leaning with time, returned to supine with max Ax1. He will continue to benefit from skilled OT to address goals, plans to return to facility at d/c      Anticipated Discharge Disposition (OT): skilled nursing facility

## 2024-04-08 NOTE — PROGRESS NOTES
[facility] Medicine Services  PROGRESS NOTE    Patient Name: Anthony Gallegos  : 1945  MRN: [id number]    Date of Admission: 3/25/2024  Primary Care Physician: Provider, No Known    Subjective   Subjective     CC:  Follow-up for recent weakness    Subjective: Patient is oriented.  No family currently at the bedside.  He denies any new complaints today.  He is eager to go to rehab.    Review of Systems  No current fevers or chills  No current shortness of breath or cough  No current nausea, vomiting, or diarrhea  No current chest pain or palpitations       Objective   Objective     Vital Signs:   Temp:  [97 °F (36.1 °C)-98.6 °F (37 °C)] 97 °F (36.1 °C)  Heart Rate:  [68-78] 75  Resp:  [16-18] 18  BP: ()/(49-61) 96/49        Physical Exam:  Constitutional:Awake, alert, no acute distress but chronically ill-appearing but nontoxic  HENT: NCAT, mucous membranes moist, neck supple  Respiratory: No cough or wheezes, normal respirations, nonlabored breathing   Cardiovascular: Pulse rate is normal, palpable radial pulses  Gastrointestinal: PEG is present, soft, nontender, nondistended  Musculoskeletal: Chronically debilitated in appearance, no lower extremity edema, BMI 24  Psychiatric: Appropriate affect, cooperative, conversational  Neurologic: No slurred speech or facial droop, follows commands  Skin: No rashes or jaundice, warm      Results Reviewed:  Results from last 7 days   Lab Units 24  0320   WBC 10*3/mm3 7.05   HEMOGLOBIN g/dL 10.1*   HEMATOCRIT % 32.0*   PLATELETS 10*3/mm3 327     Results from last 7 days   Lab Units 24  0501 24  0652 24  1608 24  0529   SODIUM mmol/L 139 141  --  141   POTASSIUM mmol/L 3.8 4.0 4.3 3.6   CHLORIDE mmol/L 107 108*  --  110*   CO2 mmol/L 25.9 24.2  --  26.2   BUN mg/dL 31* 33*  --  28*   CREATININE mg/dL 0.67* 0.83  --  0.69*   GLUCOSE mg/dL 126* 125*  --  116*   CALCIUM mg/dL 8.8 8.6  --  8.6     Estimated Creatinine Clearance: 112.6  mL/min (A) (by C-G formula based on SCr of 0.67 mg/dL (L)).    Microbiology Results Abnormal       Procedure Component Value - Date/Time    Throat / Upper Respiratory Culture - Swab, Throat [[id number]]  (Normal) Collected: 04/02/24 1213    Lab Status: Final result Specimen: Swab from Throat Updated: 04/04/24 0803     Throat Culture, Beta Strep No Beta Hemolytic Streptococcus Isolated    Narrative:      Group A Strep incidence is low in adults. Positive culture for Beta hemolytic Streptococcus species can reflect colonization and not true infection. Please correlate clinically.      Rapid Strep A Screen - Swab, Throat [[id number]]  (Normal) Collected: 04/02/24 1213    Lab Status: Final result Specimen: Swab from Throat Updated: 04/02/24 1335     Strep A Ag Negative    CANDIDA AURIS SCREEN - Swab, Axilla Right, Axilla Left and Groin [[id number]]  (Normal) Collected: 03/27/24 0859    Lab Status: Final result Specimen: Swab from Axilla Right, Axilla Left and Groin Updated: 04/01/24 0930     Candida Auris Screen Culture No Candida auris isolated at 5 days    Blood Culture - Blood, Arm, Left [[id number]]  (Normal) Collected: 03/25/24 2010    Lab Status: Final result Specimen: Blood from Arm, Left Updated: 03/30/24 2016     Blood Culture No growth at 5 days    Narrative:      Less than seven (7) mL's of blood was collected.  Insufficient quantity may yield false negative results.    Blood Culture - Blood, Arm, Right [[id number]]  (Normal) Collected: 03/25/24 1956    Lab Status: Final result Specimen: Blood from Arm, Right Updated: 03/30/24 2016     Blood Culture No growth at 5 days    Respiratory Panel PCR w/COVID-19(SARS-CoV-2) CALDERON/TRAM/KELLE/PAD/COR/JOSEPH In-House, NP Swab in UTM/VTM, 2 HR TAT - Swab, Nasopharynx [[id number]]  (Normal) Collected: 03/25/24 1722    Lab Status: Final result Specimen: Swab from Nasopharynx Updated: 03/25/24 1927     ADENOVIRUS, PCR Not Detected     Coronavirus 229E Not Detected     Coronavirus HKU1  Not Detected     Coronavirus NL63 Not Detected     Coronavirus OC43 Not Detected     COVID19 Not Detected     Human Metapneumovirus Not Detected     Human Rhinovirus/Enterovirus Not Detected     Influenza A PCR Not Detected     Influenza B PCR Not Detected     Parainfluenza Virus 1 Not Detected     Parainfluenza Virus 2 Not Detected     Parainfluenza Virus 3 Not Detected     Parainfluenza Virus 4 Not Detected     RSV, PCR Not Detected     Bordetella pertussis pcr Not Detected     Bordetella parapertussis PCR Not Detected     Chlamydophila pneumoniae PCR Not Detected     Mycoplasma pneumo by PCR Not Detected    Narrative:      In the setting of a positive respiratory panel with a viral infection PLUS a negative procalcitonin without other underlying concern for bacterial infection, consider observing off antibiotics or discontinuation of antibiotics and continue supportive care. If the respiratory panel is positive for atypical bacterial infection (Bordetella pertussis, Chlamydophila pneumoniae, or Mycoplasma pneumoniae), consider antibiotic de-escalation to target atypical bacterial infection.            Imaging Results (Last 24 Hours)       ** No results found for the last 24 hours. **                I have reviewed the medications:  Scheduled Meds:bisacodyl, 10 mg, Rectal, Once  enoxaparin, 40 mg, Subcutaneous, Q24H  lansoprazole, 30 mg, Per G Tube, Q AM  Menthol-Zinc Oxide, 1 Application, Topical, Q12H  polyethylene glycol, 17 g, Per G Tube, Daily  senna-docusate sodium, 2 tablet, Per G Tube, Daily  terazosin, 1 mg, Per G Tube, Nightly      Continuous Infusions:   PRN Meds:.  acetaminophen    senna-docusate sodium **AND** [DISCONTINUED] polyethylene glycol **AND** bisacodyl **AND** bisacodyl    dextrose    dextrose    glucagon (human recombinant)    melatonin    ondansetron ODT **OR** ondansetron    phenol    Potassium Replacement - Follow Nurse / BPA Driven Protocol    [COMPLETED] Insert Peripheral IV **AND**  sodium chloride    Assessment & Plan   Assessment & Plan     Active Hospital Problems    Diagnosis  POA    **UTI (urinary tract infection) [N39.0]  Yes    Encephalopathy [G93.40]  Yes    Decreased oral intake [R63.8]  Yes    Dysphagia [R13.10]  Yes    MARTITA (acute kidney injury) [N17.9]  Yes    Severe malnutrition [E43]  Yes    Hypertension [I10]  Yes      Resolved Hospital Problems   No resolved problems to display.        Brief Hospital Course to date:  Anthony Gallegos is a 79 y.o. male with a history of HTN, BPH pyelonephritis/UTI, who presented to [facility] from facility for failure to thrive, not eating or drinking much, weight loss, decreased mobility.     Discussion/plan for today:  All medical problems are new under my management today.  Vital stable today.  Clinically patient looks good and is oriented.  Most recent labs reviewed and acceptable.  Infection appears resolved.  Possible discharge soon pending precertification.  Continue treatment plan as outlined below by my partner except as updated    ESBL UTI  -completed a course of ertapenem at the direction of ID     Gross hematuria/acute urinary retention  -felt to be secondary to UTI  -hematuria is resolved  -a1 blocker  -he failed his voiding trial and his forde had to be replaced  -he will need to follow up with urology as an outpatient      MARTITA/bilateral hydronephrosis  -Creatinine 2.5 on admission, up from 0.66 on 01/29/2024  -CT scan showed mild-to-moderate bilateral hydronephrosis   -Status post Forde catheter placement 03/26/2024 per urology with resolution of MARTITA     Hypernatremia  -improved with increased free water flushes     Encephalopathy  -Likely secondary to UTI/dehydration/malnutrition  -No history of dementia per daughter, at baseline oriented x 3  -CT head with no acute findings  -Follow-up MRI brain 03/29/2024 showed old stroke but no acute findings  -resolved      Failure to thrive/malnutrition/decreased p.o.  intake/dysphagia  -speech therapy evaluated, underwent VFSS 03/28/2024, found to have diffuse to be profound generalized pharyngeal weakness, silent aspiration.  Strict n.p.o. recommended  -Palliative care evaluated for goals of care discussion, family wanted to proceed with PEG tube placement.  -Status post PEG tube placement 03/29  -Continue tube feeds, tolerating     Constipation  -CT scan showed moderate gaseous distention of the colon with moderate amount of stool in the right colon and rectum.  -Patient refused suppository previously  -Status post Fleet enema 03/29, small BM afterwards  -Had multiple bowel movements overnight 03/30-03/31  -resolved     Hypertension  -well controlled     Anemia of chronic disease  -Hemoglobin 10.1 on last check     GERD  -ppi     Weekly labs until discharge     Lovenox 40 mg SC daily for DVT prophylaxis.  Full code.    Disposition: I expect the patient to be discharged to skilled facility pending precertification.    CODE STATUS:   Code Status and Medical Interventions:   Ordered at: 03/25/24 1943     Code Status (Patient has no pulse and is not breathing):    CPR (Attempt to Resuscitate)     Medical Interventions (Patient has pulse or is breathing):    Full       Shola Betancourt MD  04/08/24

## 2024-04-08 NOTE — THERAPY TREATMENT NOTE
Patient Name: Anthony Gallegos  : 1945    MRN: [id number]                              Today's Date: 2024       Admit Date: 3/25/2024    Visit Dx:     ICD-10-CM ICD-9-CM   1. Decreased oral intake  R63.8 783.9   2. MARTITA (acute kidney injury)  N17.9 584.9   3. Whole body pain  R52 780.96   4. Dysphagia, unspecified type  R13.10 787.20   5. Dehydration  E86.0 276.51   6. Elevated troponin  R79.89 790.6   7. Failure to thrive in adult  R62.7 783.7   8. Generalized weakness  R53.1 780.79   9. Weight loss  R63.4 783.21   10. Urinary tract infection in male  N39.0 599.0     Patient Active Problem List   Diagnosis    Ankylosing spondylitis of cervical region    Recent unexplained weight loss    Abnormal serum lipase level    Abnormal serum level of amylase    Hypertension    Boil    Immobility    Fall from bed    Tetrahydrocannabinol (THC) use disorder, mild, abuse    Generalized pain        Grief    DISH (disseminated idiopathic skeletal hyperostosis)    Generalized weakness    Severe malnutrition    Altered mental status    Transient alteration of awareness    GERD without esophagitis    BPH (benign prostatic hyperplasia)    UTI (urinary tract infection)    Dehydration    MARTITA (acute kidney injury)    Hyperglycemia    Encephalopathy    Decreased oral intake    Dysphagia     Past Medical History:   Diagnosis Date    Abscess of scrotum     MARTITA (acute kidney injury)     Altered mental state     Arthritis     AS (ankylosing spondylitis)     History of MRSA infection     Hypertension     Leukocytosis     Tetrahydrocannabinol (THC) use disorder, mild, abuse 2023    Uveitis      Past Surgical History:   Procedure Laterality Date    COLONOSCOPY      ENDOSCOPY W/ PEG TUBE PLACEMENT N/A 3/29/2024    Procedure: ESOPHAGOGASTRODUODENOSCOPY WITH PERCUTANEOUS ENDOSCOPIC GASTROSTOMY TUBE INSERTION;  Surgeon: Khadar Broderick MD;  Location: [facility] ENDOSCOPY;  Service: General;  Laterality: N/A;  PRE/POST -  DYSPHAGIA    ROTATOR CUFF REPAIR Right     TOTAL HIP ARTHROPLASTY Left 2014      General Information       [facility] Name 04/08/24 1718          Physical Therapy Time and Intention    Document Type therapy note (daily note) (P)   -     Mode of Treatment individual therapy;physical therapy (P)   -[facility] Name 04/08/24 1718          General Information    Patient Profile Reviewed yes (P)   -HB     Existing Precautions/Restrictions fall (P)   -[facility] Name 04/08/24 1718          Cognition    Orientation Status (Cognition) oriented to;person (P)   -[facility] Name 04/08/24 1718          Safety Issues, Functional Mobility    Impairments Affecting Function (Mobility) balance;endurance/activity tolerance;strength;range of motion (ROM);postural/trunk control;pain (P)   -               User Key  (r) = Recorded By, (t) = Taken By, (c) = Cosigned By      Initials Name Provider Type    Celeste Billingsley, PT Student PT Student                   Mobility       [facility] Name 04/08/24 1718          Bed Mobility    Bed Mobility supine-sit;sit-supine;scooting/bridging (P)   -     Scooting/Bridging Bryan (Bed Mobility) dependent (less than 25% patient effort);2 person assist;verbal cues;nonverbal cues (demo/gesture) (P)   -     Supine-Sit Bryan (Bed Mobility) moderate assist (50% patient effort);1 person assist;verbal cues;nonverbal cues (demo/gesture) (P)   -     Sit-Supine Bryan (Bed Mobility) maximum assist (25% patient effort);2 person assist;verbal cues;nonverbal cues (demo/gesture) (P)   -     Assistive Device (Bed Mobility) bed rails;head of bed elevated;draw sheet (P)   -     Comment, (Bed Mobility) Requires increased time to complete bed mobility tasks, signficant posterior lean throughout, VC's for UE positioning and safety (P)   -[facility] Name 04/08/24 1718          Bed-Chair Transfer    Bed-Chair Bryan (Transfers) unable to assess (P)   -HB       Row Name 04/08/24 1718           Sit-Stand Transfer    Sit-Stand Sevier (Transfers) maximum assist (25% patient effort);2 person assist;verbal cues;nonverbal cues (demo/gesture) (P)   -HB     Comment, (Sit-Stand Transfer) STS attempt x2, demonstrates bilateral knee flexion and hip flexion with increased posterior lean throughout, unable to correct with verbal and tactile cueing (P)   -HB       Row Name 04/08/24 1718          Gait/Stairs (Locomotion)    S[place]ier Level (Gait) unable to assess (P)   -HB     Sevier Level (Stairs) not tested (P)   -HB               User Key  (r) = Recorded By, (t) = Taken By, (c) = Cosigned By      Initials Name Provider Type    Celeste Billingsley, PT Student PT Student                   Obj/Interventions       Row Name 04/08/24 1728          Motor Skills    Therapeutic Exercise other (see comments) (P)   supine, x10 bilaterally: hip abd, SLR, AP, heel slides  -HB       Row Name 04/08/24 1728          Balance    Balance Assessment sitting static balance;sitting dynamic balance;sit to stand dynamic balance (P)   -HB     Static Sitting Balance standby assist;contact guard (P)   -HB     Dynamic Sitting Balance contact guard (P)   -HB     Position, Sitting Balance unsupported;supported;sitting edge of bed (P)   -HB     Sit to Stand Dynamic Balance maximum assist;2-person assist (P)   -HB     Balance Interventions sitting;standing;sit to stand;supported (P)   -HB     Comment, Balance Significant posterior lean throughout sitting and standing activities (P)   -HB               User Key  (r) = Recorded By, (t) = Taken By, (c) = Cosigned By      Initials Name Provider Type    Celeste Billingsley, PT Student PT Student                   Goals/Plan    No documentation.                  Clinical Impression       Row Name 04/08/24 1731          Pain    Pretreatment Pain Rating 0/10 - no pain (P)   -HB     Posttreatment Pain Rating 0/10 - no pain (P)   -HB       [facility] Name 04/08/24 1731          Plan of Care Review    Plan of  Care Reviewed With patient (P)   -HB     Progress no change (P)   -HB     Outcome Evaluation Pt greeted in supine, agreeable to PT treatment this PM. Demonstrates reduced activity tolerance this date. Completes sup to sit with modA x1, STS attempts x2, and sit to sup with maxA x2. Demonstrates significant posterior lean throughout session in sitting and standing, unable to correct with cueing. Refused seated LE therex, but willing to complete supine LE therex this date. Left pt in supine with needs met. PT will continue to follow to address functional deficits. (P)   -HB       Row Name 04/08/24 1731          Therapy Assessment/Plan (PT)    Criteria for Skilled Interventions Met (PT) meets criteria;yes (P)   -HB       Row Name 04/08/24 1731          Vital Signs    O2 Delivery Pre Treatment room air (P)   -HB     O2 Delivery Intra Treatment room air (P)   -HB     O2 Delivery Post Treatment room air (P)   -HB     Pre Patient Position Supine (P)   -HB     Intra Patient Position Standing (P)   -HB     Post Patient Position Supine (P)   -HB       Row Name 04/08/24 1731          Positioning and Restraints    In Bed supine;encouraged to call for assist;exit alarm on;call light within reach (P)   -HB               User Key  (r) = Recorded By, (t) = Taken By, (c) = Cosigned By      Initials Name Provider Type    Celeste Billingsley, PT Student PT Student                   Outcome Measures       Row Name 04/08/24 1735 04/08/24 0800       How much help from another person do you currently need...    Turning from your back to your side while in flat bed without using bedrails? 2 (P)   -HB 2  -RR    Moving from lying on back to sitting on the side of a flat bed without bedrails? 2 (P)   -HB 2  -RR    Moving to and from a bed to a chair (including a wheelchair)? 2 (P)   -HB 2  -RR    Standing up from a chair using your arms (e.g., wheelchair, bedside chair)? 2 (P)   -HB 1  -RR    Climbing 3-5 steps with a railing? 1 (P)   -HB 1  -RR     To walk in hospital room? 1 (P)   -HB 1  -RR    AM-PAC 6 Clicks Score (PT) 10 (P)   -HB 9  -RR    Highest Level of Mobility Goal 4 --> Transfer to chair/commode (P)   -HB 3 --> Sit at edge of bed  -RR      Row Name 04/08/24 1735 04/08/24 1522       Functional Assessment    Outcome Measure Options AM-PAC 6 Clicks Basic Mobility (PT) (P)   -HB AM-PAC 6 Clicks Daily Activity (OT)  -              User Key  (r) = Recorded By, (t) = Taken By, (c) = Cosigned By      Initials Name Provider Type    Mindy Miranda OT Occupational Therapist    Celeste Billingsley, PT Student PT Student    Mami Hutchinson, RN Registered Nurse                                 Physical Therapy Education       Title: PT OT SLP Therapies (In Progress)       Topic: Physical Therapy (In Progress)       Point: Mobility training (In Progress)       Learning Progress Summary             Patient Acceptance, E, NR by  at 4/8/2024 1736    Acceptance, E,TB, NR by  at 4/5/2024 1514    Acceptance, E,TB,D, NR,DU by  at 4/4/2024 1318    Acceptance, E,TB,D, VU,NR by  at 4/3/2024 1530    Acceptance, E,D, NR by  at 4/2/2024 1019    Acceptance, E, NR,VU by  at 4/1/2024 1531    Acceptance, E, NR by [facility] at 3/29/2024 1129    Acceptance, E,D, NR by MS at 3/27/2024 1540                         Point: Home exercise program (In Progress)       Learning Progress Summary             Patient Acceptance, E, NR by  at 4/8/2024 1736    Acceptance, E,TB, NR by  at 4/5/2024 1514    Acceptance, E,TB,D, NR,DU by  at 4/4/2024 1318    Acceptance, E,TB,D, VU,NR by  at 4/3/2024 1530    Acceptance, E,D, NR by  at 4/2/2024 1019    Acceptance, E, NR,VU by DB at 4/1/2024 1531    Acceptance, E, NR by [facility] at 3/29/2024 1129    Acceptance, E,D, NR by MS at 3/27/2024 1540                         Point: Body mechanics (In Progress)       Learning Progress Summary             Patient Acceptance, E, NR by HB at 4/8/2024 1736    Acceptance, E,TB, NR by PH at  4/5/2024 1514    Acceptance, E,TB,D, NR,DU by PH at 4/4/2024 1318    Acceptance, E,TB,D, VU,NR by SM at 4/3/2024 1530    Acceptance, E,D, NR by SM at 4/2/2024 1019    Acceptance, E, NR,VU by DB at 4/1/2024 1531    Acceptance, E, NR by SM1 at 3/29/2024 1129    Acceptance, E,D, NR by MS at 3/27/2024 1540                         Point: Precautions (In Progress)       Learning Progress Summary             Patient Acceptance, E, NR by HB at 4/8/2024 1736    Acceptance, E,TB, NR by PH at 4/5/2024 1514    Acceptance, E,TB,D, NR,DU by PH at 4/4/2024 1318    Acceptance, E,TB,D, VU,NR by SM at 4/3/2024 1530    Acceptance, E,D, NR by SM at 4/2/2024 1019    Acceptance, E, NR,VU by DB at 4/1/2024 1531    Acceptance, E, NR by SM1 at 3/29/2024 1129    Acceptance, E,D, NR by MS at 3/27/2024 1540                                         User Key       Initials Effective Dates Name Provider Type Discipline    MS 06/16/21 -  Landon Townsend, PT Physical Therapist PT     03/07/18 -  Mireya Portillo, PTA Physical Therapist Assistant PT     06/16/21 -  Felipa Weathers, PT Physical Therapist PT     06/16/21 -  Ivelisse Lance, PTA Physical Therapist Assistant PT    [facility] 05/02/22 -  Sussy Walker, PT Physical Therapist PT     02/21/24 -  Celeste Chaudhary, PT Student PT Student PT                  PT Recommendation and Plan     Plan of Care Reviewed With: (P) patient  Progress: (P) no change  Outcome Evaluation: (P) Pt greeted in supine, agreeable to PT treatment this PM. Demonstrates reduced activity tolerance this date. Completes sup to sit with modA x1, STS attempts x2, and sit to sup with maxA x2. Demonstrates significant posterior lean throughout session in sitting and standing, unable to correct with cueing. Refused seated LE therex, but willing to complete supine LE therex this date. Left pt in supine with needs met. PT will continue to follow to address functional deficits.     Time Calculation:         PT  Charges       Row Name 04/08/24 1736             Time Calculation    Start Time 1620 (P)   -HB      Stop Time 1643 (P)   -HB      Time Calculation (min) 23 min (P)   -HB      PT Received On 04/08/24 (P)   -HB      PT - Next Appointment 04/09/24 (P)   -HB         Time Calculation- PT    Total Timed Code Minutes- PT 23 minute(s) (P)   -HB         Timed Charges    50488 - PT Therapeutic Exercise Minutes 10 (P)   -HB      20848 - PT Therapeutic Activity Minutes 13 (P)   -HB         Total Minutes    Timed Charges Total Minutes 23 (P)   -HB       Total Minutes 23 (P)   -HB                User Key  (r) = Recorded By, (t) = Taken By, (c) = Cosigned By      Initials Name Provider Type     Celeste Chaudhary, PT Student PT Student                  Therapy Charges for Today       Code Description Service Date Service Provider Modifiers Qty    48595164816 HC PT THER PROC EA 15 MIN 4/8/2024 Celeste Chaudhary, PT Student GP 1    12936838201 HC PT THERAPEUTIC ACT EA 15 MIN 4/8/2024 Celeste Chaudhary, PT Student GP 1            PT G-Codes  Outcome Measure Options: (P) AM-PAC 6 Clicks Basic Mobility (PT)  AM-PAC 6 Clicks Score (PT): (P) 10  AM-PAC 6 Clicks Score (OT): 9  PT Discharge Summary  Anticipated Discharge Disposition (PT): (P) skilled nursing facility    Celeste Chaudhary PT Student  4/8/2024

## 2024-04-09 VITALS
TEMPERATURE: 98 F | RESPIRATION RATE: 16 BRPM | HEART RATE: 70 BPM | BODY MASS INDEX: 24.78 KG/M2 | DIASTOLIC BLOOD PRESSURE: 62 MMHG | HEIGHT: 75 IN | OXYGEN SATURATION: 100 % | SYSTOLIC BLOOD PRESSURE: 115 MMHG | WEIGHT: 199.3 LBS

## 2024-04-09 PROBLEM — Z79.899 IMMUNOSUPPRESSION DUE TO DRUG THERAPY: Status: ACTIVE | Noted: 2024-04-09

## 2024-04-09 PROBLEM — N17.9 AKI (ACUTE KIDNEY INJURY): Status: RESOLVED | Noted: 2024-01-22 | Resolved: 2024-04-09

## 2024-04-09 PROBLEM — R62.7 FAILURE TO THRIVE IN ADULT: Status: ACTIVE | Noted: 2024-04-09

## 2024-04-09 PROBLEM — G93.40 ENCEPHALOPATHY: Status: RESOLVED | Noted: 2024-03-26 | Resolved: 2024-04-09

## 2024-04-09 PROBLEM — E86.0 DEHYDRATION: Status: RESOLVED | Noted: 2024-01-22 | Resolved: 2024-04-09

## 2024-04-09 PROBLEM — D84.821 IMMUNOSUPPRESSION DUE TO DRUG THERAPY: Status: ACTIVE | Noted: 2024-04-09

## 2024-04-09 LAB
GLUCOSE BLDC GLUCOMTR-MCNC: 116 MG/DL (ref 70–130)
GLUCOSE BLDC GLUCOMTR-MCNC: 91 MG/DL (ref 70–130)

## 2024-04-09 PROCEDURE — 82948 REAGENT STRIP/BLOOD GLUCOSE: CPT

## 2024-04-09 RX ORDER — LANSOPRAZOLE 30 MG/1
30 TABLET, ORALLY DISINTEGRATING, DELAYED RELEASE ORAL
Start: 2024-04-10

## 2024-04-09 RX ORDER — BISACODYL 5 MG/1
5 TABLET, DELAYED RELEASE ORAL DAILY PRN
Start: 2024-04-09

## 2024-04-09 RX ORDER — POLYETHYLENE GLYCOL 3350 17 G/17G
POWDER, FOR SOLUTION ORAL
Start: 2024-04-10

## 2024-04-09 RX ORDER — METHOTREXATE 2.5 MG/1
2.5 TABLET ORAL WEEKLY
Start: 2024-04-09

## 2024-04-09 RX ORDER — METHOTREXATE 2.5 MG/1
5 TABLET ORAL WEEKLY
Start: 2024-04-09 | End: 2024-04-09

## 2024-04-09 RX ORDER — TERAZOSIN 1 MG/1
1 CAPSULE ORAL NIGHTLY
Start: 2024-04-09

## 2024-04-09 RX ORDER — AMOXICILLIN 250 MG
2 CAPSULE ORAL DAILY
Start: 2024-04-10

## 2024-04-09 RX ADMIN — LANSOPRAZOLE 30 MG: 15 TABLET, ORALLY DISINTEGRATING ORAL at 06:12

## 2024-04-09 RX ADMIN — POLYETHYLENE GLYCOL 3350 17 G: 17 POWDER, FOR SOLUTION ORAL at 09:26

## 2024-04-09 RX ADMIN — DOCUSATE SODIUM 50MG AND SENNOSIDES 8.6MG 2 TABLET: 8.6; 5 TABLET, FILM COATED ORAL at 09:26

## 2024-04-09 NOTE — PROGRESS NOTES
"Physicians Statement of Medical Necessity for  Ambulance Transportation    GENERAL INFORMATION     Name: Anthony Gallegos  YOB: 1945  [facility] stretcher transportation today at 17:00 (Reservation #[id number]) and the cost $200.00 [facility] to cover the cost  Transport Date: 4/9/2024 (Valid for round trips this date, or for scheduled repetitive trips for 60 days from the date signed below.)  Origin: [address] ([phone])  Destination: [facility], [address] [place] [id number]  Is the Patient's stay covered under Medicare Part A (PPS/DRG?)No   Closest appropriate facility? Yes  If this a hosp-hosp transfer? No  Is this a hospice patient? No    MEDICAL NECESSITY QUESTIONAIRE    Ambulance Transportation is medically necessary only if other means of transportation are contraindicated or would be potentially harmful to the patient.  To meet this requirement, the patient must be either \"bed confined\" or suffer from a condition such that transport by means other than an ambulance is contraindicated by the patient's condition.  The following questions must be answered by the healthcare professional signing below for this form to be valid:     1) Describe the MEDICAL CONDITION (physical and/or mental) of this patient AT THE TIME OF AMBULANCE TRANSPORT that requires the patient to be transported in an ambulance, and why transport by other means is contraindicated by the patient's condition:    Past Medical History:   Diagnosis Date    Abscess of scrotum     MARTITA (acute kidney injury)     Altered mental state     Arthritis     AS (ankylosing spondylitis)     History of MRSA infection     Hypertension     Leukocytosis     Tetrahydrocannabinol (THC) use disorder, mild, abuse 12/20/2023    Uveitis       Past Surgical History:   Procedure Laterality Date    COLONOSCOPY      ENDOSCOPY W/ PEG TUBE PLACEMENT N/A 3/29/2024    Procedure: ESOPHAGOGASTRODUODENOSCOPY WITH " "PERCUTANEOUS ENDOSCOPIC GASTROSTOMY TUBE INSERTION;  Surgeon: Khadar Broderick MD;  Location: [facility] ENDOSCOPY;  Service: General;  Laterality: N/A;  PRE/POST - DYSPHAGIA    ROTATOR CUFF REPAIR Right     TOTAL HIP ARTHROPLASTY Left 2014      2) Is this patient \"bed confined\" as defined below?Yes   To be \"bed confined\" the patient must satisfy all three of the following criteria:  (1) unable to get up from bed without assistance; AND (2) unable to ambulate;  AND (3) unable to sit in a chair or wheelchair.  3) Can this patient safely be transported by car or wheelchair van (I.e., may safely sit during transport, without an attendant or monitoring?)No   4. In addition to completing questions 1-3 above, please check any of the following conditions that apply*:          *Note: supporting documentation for any boxes checked must be maintained in the patient's medical records Other weakness      SIGNATURE OF PHYSICIAN OR OTHER AUTHORIZED HEALTHCARE PROFESSIONAL    I certify that the above information is true and correct based on my evaluation of this patient, and represent that the patient requires transport by ambulance and that other forms of transport are contraindicated.  I understand that this information will be used by the Centers for Medicare and Medicaid Services (CMS) to support the determiniation of medical necessity for ambulance services, and I represent that I have personal knowledge of the patient's condition at the time of transport.        If this box is checked, I also certify that the patient is physically or mentally incapable of signing the ambulance service's claim form and that the institution with which I am affiliated has furnished care, services or assistance to the patient.  My signature below is made on behalf of the patient pursuant to 42 .36(b)(4). In accordance with 42 .37, the specific reason(s) that the patient is physically or mentally incapable of signing the claim " for is as follows:      Signature of Physician or Healthcare Professional  Date/Time:    4/9/24@ 17:00     (For Scheduled repetitive transport, this form is not valid for transports performed more than 60 days after this date).                                                                                                                                            --------------------------------------------------------------------------------------------  Printed Name and Credentials of Physician or Authorized Healthcare Professional     *Form must be signed by patient's attending physician for scheduled, repetitive transports,.  For non-repetitive ambulance transports, if unable to obtain the signature of the attending physician, any of the following may sign (please select below):     Physician  Clinical Nurse Specialist  X Registered Nurse     Physician Assistant  X Discharge Planner  Licensed Practical Nurse     Nurse Practitioner  X

## 2024-04-09 NOTE — PROGRESS NOTES
Discharge Planning Assessment  [facility]     Patient Name: Anthony Gallegos  MRN: [id number]  Today's Date: 4/9/2024    Admit Date: 3/25/2024    Plan: [facility], skilled bed pending pre-cert then LT medicaid bed pending. VIRI Prabhakar, RN, CCP.   Discharge Needs Assessment    No documentation.                  Discharge Plan       Row Name 04/09/24 1123       Plan    Plan Comments Called [facility] to set up transportation and left a message. VIRI Prabhakar RN, CCP.                  Continued Care and Services - Admitted Since 3/25/2024       Destination       Service Provider Request Status Selected Services Address Phone Fax Patient Preferred    [facility] Accepted N/A [address] [facility] [address] [phone] --    [facility] Declined  Not eligible N[facility]A [address] [facility] [id number] [phone] --    [facility] Declined N/A [address] [facility] [id number] [phone] --    [facility] Declined N/A [address] [facility] [address] [phone] --    [facility] Declined N/A [address] [facility] [address] [phone] --              Home Medical Care       Service Provider Request Status Selected Services Address Phone Fax Patient Preferred    [facility] Declined  Insurance Denial N/A [address] [facility] [address] [phone] --                  Selected Continued Care - Prior Encounters Includes continued care and service providers with selected services from prior encounters from 12/26/2023 to 4/9/2024      Discharged on 1/31/2024 Admission date: 1/21/2024 - Discharge disposition: Skilled Nursing Facility (DC - External)      Destination       Service Provider Selected Services Address Phone Fax Patient Preferred    [facility]  [address] [facility] [address] [phone] --                      Discharged on 1/8/2024 Admission date: 12/20/2023 - Discharge disposition: Skilled Nursing Facility (DC - External)      Destination       Service Provider Selected Services Address Phone Fax Patient Preferred    [facility] [address] [address] [phone] --                          Expected Discharge Date and Time       Expected Discharge Date Expected Discharge Time    Apr 9, 2024            Demographic Summary    No documentation.                  Functional Status    No documentation.                  Psychosocial    No documentation.                  Abuse/Neglect    No documentation.                  Legal    No documentation.                  Substance Abuse    No documentation.                  Patient Forms    No documentation.                     Delaney Prabhakar, RN

## 2024-04-09 NOTE — DISCHARGE SUMMARY
[facility] Medicine Services  DISCHARGE SUMMARY    Patient Name: Anthony Gallegos  : 1945  MRN: [id number]    Date of Admission: 3/25/2024  4:13 PM  Date of Discharge: 2024    Consults       Date and Time Order Name Status Description    2024 11:39 AM Inpatient ENT Consult      3/29/2024  2:19 PM Inpatient Infectious Diseases Consult Completed     3/28/2024  2:05 PM Inpatient General Surgery Consult Completed     3/26/2024  9:39 PM Inpatient Urology Consult      3/26/2024  7:31 AM Inpatient Urology Consult Completed     3/25/2024 11:39 PM Inpatient Nephrology Consult Completed     3/25/2024  7:18 PM [facility] (on-call MD unless specified) Details              Hospital Course       Active Hospital Problems    Diagnosis  POA    **UTI (urinary tract infection) [N39.0]  Yes    Failure to thrive in adult [R62.7]  Yes    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, Z79.899]  Not Applicable    Decreased oral intake [R63.8]  Yes    Dysphagia [R13.10]  Yes    BPH (benign prostatic hyperplasia) [N40.0]  Yes    GERD without esophagitis [K21.9]  Yes    Severe malnutrition [E43]  Yes    Generalized weakness [R53.1]  Yes    DISH (disseminated idiopathic skeletal hyperostosis) [M48.10]  Yes    Generalized pain [R52]  Yes    Immobility [Z74.09]  Yes    Hypertension [I10]  Yes    Ankylosing spondylitis of cervical region [M45.2]  Yes      Resolved Hospital Problems    Diagnosis Date Resolved POA    Encephalopathy [G93.40] 2024 Yes    MARTITA (acute kidney injury) [N17.9] 2024 Yes    Dehydration [E86.0] 2024 Yes          Hospital Course:  Anthony Gallegos is a 79 y.o. male with a history of HTN, BPH pyelonephritis/UTI, who presented to [facility] from facility for failure to thrive, not eating or drinking much, weight loss, decreased mobility.  He was treated for an ESBL urinary tract infection with secondary gross hematuria and urine retention.  He failed his voiding trial and will need  to follow-up with urology outpatient.  His multifactorial MARTITA with bilateral hydronephrosis also likely due to multiple issues including urine retention/obstruction.  MARTITA resolved with Forde catheter.  Due to failure to thrive and dysphagia patient is currently on a tube feeding diet.  He received a PEG tube placement this hospitalization.  He had constipation and is doing well on a bowel regimen.  He is now stabilized currently on his tube feeding and has completed antibiotic treatment for his ESBL urinary tract infection.  He has been cleared to discharge by all consultant teams and case management has arranged for discharge to SNF for subacute rehabilitation.  I discussed with his daughter today who is in agreement with the plan.        ESBL UTI  -completed a course of ertapenem at the direction of ID, no further issues     Gross hematuria/acute urinary retention  -felt to be secondary to UTI  -hematuria is resolved  -a1 blocker per tube  -he failed his voiding trial and his forde had to be replaced  -he will need to follow up with urology as an outpatient      MARTITA/bilateral hydronephrosis  -Creatinine 2.5 on admission, up from 0.66 on 01/29/2024  -CT scan showed mild-to-moderate bilateral hydronephrosis   -Status post Forde catheter placement 03/26/2024 per urology with resolution of MARTITA, nephrology has signed off as well     Hypernatremia  -improved with increased free water flushes, sodium level now normal     Multifactorial metabolic encephalopathy, resolved  -Likely secondary to UTI/dehydration/malnutrition  -No history of dementia per daughter, at baseline oriented x 3  -CT head with no acute findings  -Follow-up MRI brain 03/29/2024 showed old stroke but no acute findings  -resolved      Failure to thrive/  malnutrition/  decreased p.o. intake/  dysphagia requiring PEG tube placement  -speech therapy evaluated, underwent VFSS 03/28/2024, found to have diffuse to be profound generalized pharyngeal weakness,  silent aspiration.  Strict n.p.o. recommended  -Palliative care evaluated for goals of care discussion, family wanted to proceed with PEG tube placement.  -Status post PEG tube placement 03/29  -Continue tube feeds, tolerating     Constipation, resolved  -CT scan showed moderate gaseous distention of the colon with moderate amount of stool in the right colon and rectum.  -Patient refused suppository previously  -Status post Fleet enema 03/29, small BM afterwards  -Had multiple bowel movements overnight 03/30-03/31  -resolved     Hypertension  -well controlled     Anemia of chronic disease  Stable no bleeding     GERD  -ppi, stable    Reported history of ankylosing spondylitis on chronic immunosuppression with methotrexate:  Now that infection has been addressed patient can resume methotrexate at discharge.  I recommended he follow-up with his prescribing provider and he is agreeable.  I discussed this with his daughter over the phone as well.    At the time of discharge patient was told to take all medications as prescribed, keep all follow-up appointments, and call their doctor or return to the hospital with any worsening or concerning symptoms.    Please note that this note was made using Dragon voice recognition software               Day of Discharge     Subjective: Feels well today.  Wants to transfer to SNF.  No new complaints.  Feels pretty good.    Vital Signs:   Temp:  [97.5 °F (36.4 °C)-98.3 °F (36.8 °C)] 98 °F (36.7 °C)  Heart Rate:  [70-75] 70  Resp:  [16-18] 16  BP: (108-134)/(57-67) 115/62     Physical Exam:  Constitutional:Awake, alert, no acute distress but chronically ill-appearing but nontoxic  HENT: NCAT, mucous membranes moist, neck supple  Respiratory: No cough or wheezes, nonlabored breathing   Cardiovascular: Pulse rate is normal, palpable radial pulses  Gastrointestinal: PEG is present, soft, nontender, nondistended  Musculoskeletal: Chronically debilitated in appearance, no lower extremity  edema, BMI 24  Psychiatric: Appropriate affect, cooperative, conversational  Neurologic: No slurred speech or facial droop, follows commands  Skin: No rashes or jaundice, warm    Pertinent  and/or Most Recent Results     Results from last 7 days   Lab Units 04/06/24  0501 04/05/24  0652 04/04/24  1608 04/04/24  0529 04/03/24  1623 04/03/24  0406   SODIUM mmol/L 139 141  --  141  --  144   POTASSIUM mmol/L 3.8 4.0 4.3 3.6 3.8 3.6   CHLORIDE mmol/L 107 108*  --  110*  --  112*   CO2 mmol/L 25.9 24.2  --  26.2  --  26.0   BUN mg/dL 31* 33*  --  28*  --  34*   CREATININE mg/dL 0.67* 0.83  --  0.69*  --  0.97   GLUCOSE mg/dL 126* 125*  --  116*  --  111*   CALCIUM mg/dL 8.8 8.6  --  8.6  --  8.2*     Brief Urine Lab Results  (Last result in the past 365 days)        Color   Clarity   Blood   Leuk Est   Nitrite   Protein   CREAT   Urine HCG        03/25/24 1918 Yellow   Turbid   Large (3+)   Large (3+)   Negative   >=300 mg/dL (3+)                         Imaging Results (All)       Procedure Component Value Units Date/Time    MRI Brain Without Contrast [[id number]] Collected: 03/29/24 2122     Updated: 03/29/24 2129    Narrative:      BRAIN MRI WITHOUT CONTRAST     HISTORY: Mental status change, unknown cause; R63.8-Other symptoms and  signs concerning food and fluid intake; N17.9-Acute kidney failure,  unspecified; R52-Pain, unspecified; R13.10-Dysphagia, unspecified;  E86.0-Dehydration; R79.89-Other specified abnormal findings of blood  chemistry; R62.7-Adult failure to thrive; R53.1-Weakness; R63.4-Abnormal  weight loss; N39.0-Urinary tract infection, site not specifi     COMPARISON: March 25, 2024.     FINDINGS:  Multiplanar images of the head were obtained without  gadolinium. No areas of restricted diffusion are seen to suggest acute  infarct. There is atrophy. There is extensive periventricular and deep  white matter microangiopathic change. There is no midline shift or mass  effect. Intracranial flow voids appear  intact. Old lacunar infarcts are  noted within the left basal ganglia. No abnormality is seen on gradient  echo imaging. There may be some mucosal thickening within the ethmoid  sinuses.       Impression:      1. No acute intracranial abnormality.     This report was finalized on 3/29/2024 9:26 PM by Dr. Kirsten Peña M.D on Workstation: [id number]       FL Video Swallow Single Contrast [[id number]] Collected: 03/28/24 1355     Updated: 03/28/24 1731    Narrative:      VIDEO SWALLOWING EXAMINATION BY SPEECH PATHOLOGY     Clinical: Dysphasia     Reference air kerma: 6.73 mGy     Video swallowing examination performed under the direction of speech  pathology. Imaging reviewed by radiologist who concurs with the  findings.     Speech pathology summary:      VFSS completed, Kristal SONG present. Aspiration was seen     By electronically signing this report, I, the supervising radiologist,  attest that I was not present for the procedure(s) but agree with the  final edited report..     Please refer to speech pathology report for further information.            This report was finalized on 3/28/2024 5:27 PM by Dr. Roddy Chaparro M.D  on Workstation: [id number]       CT Abdomen Pelvis Without Contrast [[id number]] Collected: 03/25/24 1957     Updated: 03/27/24 1729    Narrative:      CT OF THE ABDOMEN AND PELVIS WITHOUT CONTRAST 03/25/2024     HISTORY: Abdominal pain.     Spiral images were obtained from the lung bases to the symphysis pubis.  No intravenous or oral contrast was given.     There is streak artifact from patient's arms. The liver, gallbladder and  spleen appear unremarkable. Pancreas appears somewhat atrophic. The  adrenals appear unremarkable. Kidneys are obscured by streak artifact  but there may be some mild hydronephrosis bilaterally, greater on the  right than on the left and there may be at least 1 or 2 right renal  cysts including moderate size low-density lower pole right renal cyst.      There is moderate gaseous distention of the colon with moderate amount  of stool in the right colon. No small bowel dilatation is seen. Urinary  bladder is moderately distended. Multiple fluid collections are seen  adjacent to the superior aspect of the urinary bladder and these may  represent multiple urinary bladder diverticula but are somewhat more  numerous than on the previous study of 01/23/2024. There is wall  thickening of the urinary bladder as well. Left hip prosthesis produces  streak artifact and obscures the pelvis as well.       Impression:      1. Some of the images are obscured by streak artifact from patient's  arms and left hip prosthesis.  2. Kidneys are somewhat obscured but there may be mild-to-moderate  bilateral hydronephrosis and at least 1 right renal cyst.  3. Moderate gaseous distention of the colon with moderate amount of  stool in the right colon and rectum.  4. 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 These appear more extensive than on the 01/23/2024  study. Please correlate for urinary bladder outlet obstruction/partial  obstruction. There could be an element of cystitis as well.     Radiation dose reduction techniques were utilized, including automated  exposure control and exposure modulation based on body size.        This report was finalized on 3/27/2024 5:26 PM by Dr. Dario Dumont M.D on Workstation: [id number]       CT Head Without Contrast [[id number]] Collected: 03/25/24 1835     Updated: 03/27/24 1241    Narrative:      CT OF THE BRAIN WITHOUT CONTRAST 03/25/2024     HISTORY: Mental status change.     Axial images were obtained through the brain without intravenous  contrast.     There is moderate diffuse atrophy. There is decreased attenuation of the  periventricular white matter bilaterally consistent with small vessel  white matter ischemic disease. There is no evidence of acute infarction,  hemorrhage, midline shift  or mass effect.     No bony abnormalities are seen.       Impression:      No acute process identified.     Radiation dose reduction techniques were utilized, including automated  exposure control and exposure modulation based on body size.        This report was finalized on 3/27/2024 12:38 PM by Dr. Dario Dumont M.D on Workstation: [id number]       XR Chest 1 View [[id number]] Collected: 03/25/24 1756     Updated: 03/25/24 1809    Narrative:      XR CHEST 1 VW-3/25/2024     HISTORY: Shortness of breath.     Heart size is within normal limits. Lungs appear free of acute  infiltrates. There is mild vascular congestion. There is some aortic  calcification.       Impression:      1. No acute process except for mild vascular congestion.        This report was finalized on 3/25/2024 6:06 PM by Dr. Dario Dumont M.D on Workstation: [id number]               Discharge Details        Discharge Medications        New Medications        Instructions Start Date   bisacodyl 5 MG EC tablet  Commonly known as: DULCOLAX   5 mg, Oral, Daily PRN      lansoprazole 30 MG Tablet Delayed Release Dispersible disintegrating tablet  Commonly known as: PREVACID SOLUTAB   30 mg, Per G Tube, Every Early Morning   Start Date: April 10, 2024     Menthol-Zinc Oxide 0.44-20.6 % ointment   1 Application, Topical, Every 12 Hours Scheduled      polyethylene glycol 17 g packet  Commonly known as: MIRALAX   1 packet daily per tube   Start Date: April 10, 2024     sennosides-docusate 8.6-50 MG per tablet  Commonly known as: PERICOLACE   2 tablets, Per G Tube, Daily   Start Date: April 10, 2024     terazosin 1 MG capsule  Commonly known as: HYTRIN   1 mg, Per G Tube, Nightly             Changes to Medications        Instructions Start Date   methotrexate 2.5 MG tablet  What changed: See the new instructions.   2.5 mg, Oral, Weekly, Take 2.5 mg on Wednesday and 2.5 mg on Thursday.  Do not take any medication on Friday through Tuesday.              Continue These Medications        Instructions Start Date   acetaminophen 325 MG tablet  Commonly known as: TYLENOL   650 mg, Oral, Every 4 Hours PRN      melatonin 3 MG tablet   3 mg, Oral, Nightly PRN      OMEGA 3 PO   1 capsule, Oral, Daily             Stop These Medications      atenolol-chlorthalidone 50-25 MG per tablet  Commonly known as: TENORETIC     finasteride 5 MG tablet  Commonly known as: PROSCAR     pantoprazole 40 MG EC tablet  Commonly known as: PROTONIX     tamsulosin 0.4 MG capsule 24 hr capsule  Commonly known as: FLOMAX              No Known Allergies      Discharge Disposition:  Skilled Nursing Facility (DC - External)    Diet:  Hospital:  Diet Order   Procedures    NPO Diet NPO Type: Tube Feeding       Activity:  Activity Instructions       Activity as Tolerated      Up WIth Assist                   CODE STATUS:    Code Status and Medical Interventions:   Ordered at: 03/25/24 1943     Code Status (Patient has no pulse and is not breathing):    CPR (Attempt to Resuscitate)     Medical Interventions (Patient has pulse or is breathing):    Full       Additional Instructions for the Follow-ups that You Need to Schedule       Discharge Follow-up with PCP   As directed       Currently Documented PCP:    Provider, No Known    PCP Phone Number:    [phone]     Follow Up Details: Recommend follow-up with your primary care provider or with the primary provider at your facility within 1 week for general hospital follow-up and blood pressure check.               Contact information for follow-up providers       Bennie Noel MD Follow up in 1 month(s).    Specialty: Urology  Why: For urine retention and hematuria  Contact information:  [address] [facility] [facility] [id number] [phone]               Provider, No Known .    Why: Recommend follow-up with your primary care provider or with the primary provider at your facility within 1 week for general hospital follow-up and blood  pressure check.  Contact information:  [facility] [address]  [phone]                       Contact information for after-discharge care       Destination       [facility] .    Service: Skilled Nursing  Contact information:  [address] [address]  [phone]                                       Shola Betancourt MD  04/09/24      Time Spent on Discharge:  I spent greater than 40 minutes on this discharge activity which included: face-to-face encounter with the patient, reviewing the data in the system, coordination of the care with the nursing staff as well as consultants, documentation, and entering orders.

## 2024-04-09 NOTE — PLAN OF CARE
Goal Outcome Evaluation:  Plan of Care Reviewed With: patient        Progress: no change  Outcome Evaluation: VSS. Patient is alert and oriented x 3-4. Contact precautions maintained. NPO. Peg tube in place with no residual noted. Patient is receiving Novosource Renal at 50mL/hr with flush at 50mL/hr. Tolerating well. New bag of feeding placed. Mariscal catheter in place. Room air. Specialty bed. Blood sugar checks every 6 hours. No complaints of pain this shift. Continue plan of care.

## 2024-04-09 NOTE — PROGRESS NOTES
Case Management Discharge Note      Final Note: Discharged to [facility], skilled bed and [facility] stretcher transported. VIRI Prabhakar RN CCP    Provided Post Acute Provider List?: N/A    Selected Continued Care - Admitted Since 3/25/2024       Destination Coordination complete.      Service Provider Selected Services Address Phone Fax Patient Preferred    [facility] Skilled Nursing [address] [facility] [address] [phone] --              Durable Medical Equipment    No services have been selected for the patient.                Dialysis/Infusion    No services have been selected for the patient.                Home Medical Care    No services have been selected for the patient.                Therapy    No services have been selected for the patient.                Community Resources    No services have been selected for the patient.                Community & DME    No services have been selected for the patient.                    Selected Continued Care - Prior Encounters Includes continued care and service providers with selected services from prior encounters from 12/26/2023 to 4/9/2024      Discharged on 1/31/2024 Admission date: 1/21/2024 - Discharge disposition: Skilled Nursing Facility (DC - External)      Destination       Service Provider Selected Services Address Phone Fax Patient Preferred    [facility] Skilled Nursing [address] [facility] [address] [phone] --                      Discharged on 1/8/2024 Admission date: 12/20/2023 - Discharge disposition: Skilled Nursing Facility (DC - External)      Destination       Service Provider Selected Services Address Phone Fax Patient Preferred    [facility] Skilled Nursing [address] [facility] [address] [phone] --                          Transportation Services  Ambulance: [facility] (Called [facility] stretch  transportation on 4/9/24 and they can provide the transportation today at 17:00 (reservation #[id number] and cost $200.00 [employer] to cover). VIRI Prabhakar)    Final Discharge Disposition Code: 03 - skilled nursing facility (SNF)

## 2024-04-09 NOTE — PROGRESS NOTES
Discharge Planning Assessment  [facility]     Patient Name: Anthony Gallegos  MRN: [id number]  Today's Date: 4/9/2024    Admit Date: 3/25/2024    Plan: [place], skilled bed pending pre-cert then LTC medicaid bed pending. VIRI Prabhakar RN, CCP.   Discharge Needs Assessment    No documentation.                  Discharge Plan       Row Name 04/09/24 1252       Plan    Plan Comments Called [facility] stretcher transportation on 4/9/24 and they can provide the transportation today at 17:00 (reservation #[id number] and cost $200.00 [employer] to cover). Spoke with Maddie and they can accept today to a skilled bed and they did receive the pre-cert for skilled bed and has spoke with the patient's daughter regarding LTC medicaid bed pending. Spoke with Whit/daughter and notified her of the discharge today and that the transportation is setup and she is agreeable with the discharge plans and she will notify her father and brother of the discharge.  VIRI Prabhakar RN, CCP      Row Name 04/09/24 1125       Plan    Plan Comments Called [facility] [facility] to set up transportation and left a message. VIRI Prabhakar RN, CCP.                  Continued Care and Services - Admitted Since 3/25/2024       Destination Coordination complete.      Service Provider Request Status Selected Services Address Phone Fax Patient Preferred    [facility]  Selected Skilled Nursing [address] [facility] [address] [phone] --    [facility] Declined  Not eligible N/A [address] [facility] [address] [phone] --    [facility] Declined N/A [address] [place] [place] [id number] [phone] --    [facility] Declined N/A [address] [facility] [address] [phone] --    [facility] Declined N/A [address] [facility] [address] [phone] --              Home  Medical Care       Service Provider Request Status Selected Services Address Phone Fax Patient Preferred    [facility] Declined  Insurance Denial N/A [address] [address] [phone] --                  Selected Continued Care - Prior Encounters Includes continued care and service providers with selected services from prior encounters from 12/26/2023 to 4/9/2024      Discharged on 1/31/2024 Admission date: 1/21/2024 - Discharge disposition: Skilled Nursing Facility (DC - External)      Destination       Service Provider Selected Services Address Phone Fax Patient Preferred    [facility]illed Nursing [address] [facility] [address] [phone] --                      Discharged on 1/8/2024 Admission date: 12/20/2023 - Discharge disposition: Skilled Nursing Facility (DC - External)      Destination       Service Provider Selected Services Address Phone Fax Patient Preferred    [facility] [address] [facility] [address] [phone] --                          Expected Discharge Date and Time       Expected Discharge Date Expected Discharge Time    Apr 9, 2024            Demographic Summary    No documentation.                  Functional Status    No documentation.                  Psychosocial    No documentation.                  Abuse/Neglect    No documentation.                  Legal    No documentation.                  Substance Abuse    No documentation.                  Patient Forms    No documentation.                     Delaney Prabhakar, TERESA

## 2024-04-09 NOTE — SIGNIFICANT NOTE
04/09/24 1012   Post Acute Pre-Cert Documentation   Request Submitted by Facility - Type: Hospital   Post-Acute Authorization Type Submitted: SNF   Date Post Acute Pre-Cert Inititated per Facility 04/04/24   Date Post Acute Pre-Cert Completed 04/09/24   Accepting Facility [facility] Discharge Date Requested 04/09/24   All Clinicals Submitted? Yes   Had Accepting Facility at Time of Submission Yes   Response Received from Insurance? Approval  (4/8/24 - 4/20/24)   Response Communicated to: ;Accepting Facility Liaison;Accepting Facility Auth Department   Authorization Number: [id number]   Post Acute Pre-Cert Initiated Comment ALIREZA REECE

## 2024-04-10 NOTE — PAYOR COMM NOTE
"Anthony Gallegos (79 y.o. Male)          DC SUMMARY FOR [id number]          Date of Birth   1945    Social Security Number       Address   [address] [place] [id number]    Home Phone   [phone]    MRN   [id number]       [facility]    Marital Status                               Admission Date   3/25/24    Admission Type   Emergency    Admitting Provider   Casie Reardon MD    Attending Provider       Department, Room/Bed   [address], P492/1       Discharge Date   2024    Discharge Disposition   Skilled Nursing Facility (DC - External)    Discharge Destination                                 Attending Provider: (none)   Allergies: No Known Allergies    Isolation: None   Infection: MRSA/History Only (23), ESBL E coli (24)   Code Status: Prior    Ht: 190.5 cm (75\")   Wt: 90.4 kg (199 lb 4.7 oz)    Admission Cmt: None   Principal Problem: UTI (urinary tract infection) [N39.0]                   Active Insurance as of 3/25/2024       Primary Coverage       Payor Plan Insurance Group Employer/Plan Group    AETNA MEDICARE REPLACEMENT AETNA MED ADV POS [id number]       Payor Plan Address Payor Plan Phone Number Payor Plan Fax Number Effective Dates    [address] [phone]  2023 - None Entered    [facility] [id number]         Subscriber Name Subscriber Birth Date Member ID       ANTHONY GALLEGOS 1945 [id number]                     Emergency Contacts        (Rel.) Home Phone Work Phone Mobile Phone    NATHALIE DON (Daughter) [phone] -- --    MAGY GALLEGOS [phone] -- --                 Discharge Summary        Shola Betancourt MD at [facility]              [facility]  DISCHARGE SUMMARY    Patient Name: Anthony Gallegos  : 1945  MRN: [id number]    Date of Admission: 3/25/2024  4:13 PM  Date of Discharge: 2024    Consults       Date and Time Order Name " Status Description    4/2/2024 11:39 AM Inpatient ENT Consult      3/29/2024  2:19 PM Inpatient Infectious Diseases Consult Completed     3/28/2024  2:05 PM Inpatient General Surgery Consult Completed     3/26/2024  9:39 PM Inpatient Urology Consult      3/26/2024  7:31 AM Inpatient Urology Consult Completed     3/25/2024 11:39 PM Inpatient Nephrology Consult Completed     3/25/2024  7:18 PM [facility] (on-call MD unless specified) Details              Hospital Course       Active Hospital Problems    Diagnosis  POA    **UTI (urinary tract infection) [N39.0]  Yes    Failure to thrive in adult [R62.7]  Yes    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, Z79.899]  Not Applicable    Decreased oral intake [R63.8]  Yes    Dysphagia [R13.10]  Yes    BPH (benign prostatic hyperplasia) [N40.0]  Yes    GERD without esophagitis [K21.9]  Yes    Severe malnutrition [E43]  Yes    Generalized weakness [R53.1]  Yes    DISH (disseminated idiopathic skeletal hyperostosis) [M48.10]  Yes    Generalized pain [R52]  Yes    Immobility [Z74.09]  Yes    Hypertension [I10]  Yes    Ankylosing spondylitis of cervical region [M45.2]  Yes      Resolved Hospital Problems    Diagnosis Date Resolved POA    Encephalopathy [G93.40] 04/09/2024 Yes    MARTITA (acute kidney injury) [N17.9] 04/09/2024 Yes    Dehydration [E86.0] 04/09/2024 Yes          Hospital Course:  Anthony Gallegos is a 79 y.o. male with a history of HTN, BPH pyelonephritis/UTI, who presented to [facility] from facility for failure to thrive, not eating or drinking much, weight loss, decreased mobility.  He was treated for an ESBL urinary tract infection with secondary gross hematuria and urine retention.  He failed his voiding trial and will need to follow-up with urology outpatient.  His multifactorial MARTITA with bilateral hydronephrosis also likely due to multiple issues including urine retention/obstruction.  MARTITA resolved with Mariscal catheter.  Due to failure to thrive and  dysphagia patient is currently on a tube feeding diet.  He received a PEG tube placement this hospitalization.  He had constipation and is doing well on a bowel regimen.  He is now stabilized currently on his tube feeding and has completed antibiotic treatment for his ESBL urinary tract infection.  He has been cleared to discharge by all consultant teams and case management has arranged for discharge to SNF for subacute rehabilitation.  I discussed with his daughter today who is in agreement with the plan.        ESBL UTI  -completed a course of ertapenem at the direction of ID, no further issues     Gross hematuria/acute urinary retention  -felt to be secondary to UTI  -hematuria is resolved  -a1 blocker per tube  -he failed his voiding trial and his forde had to be replaced  -he will need to follow up with urology as an outpatient      MARTITA/bilateral hydronephrosis  -Creatinine 2.5 on admission, up from 0.66 on 01/29/2024  -CT scan showed mild-to-moderate bilateral hydronephrosis   -Status post Forde catheter placement 03/26/2024 per urology with resolution of MARTITA, nephrology has signed off as well     Hypernatremia  -improved with increased free water flushes, sodium level now normal     Multifactorial metabolic encephalopathy, resolved  -Likely secondary to UTI/dehydration/malnutrition  -No history of dementia per daughter, at baseline oriented x 3  -CT head with no acute findings  -Follow-up MRI brain 03/29/2024 showed old stroke but no acute findings  -resolved      Failure to thrive/  malnutrition/  decreased p.o. intake/  dysphagia requiring PEG tube placement  -speech therapy evaluated, underwent VFSS 03/28/2024, found to have diffuse to be profound generalized pharyngeal weakness, silent aspiration.  Strict n.p.o. recommended  -Palliative care evaluated for goals of care discussion, family wanted to proceed with PEG tube placement.  -Status post PEG tube placement 03/29  -Continue tube feeds, tolerating      Constipation, resolved  -CT scan showed moderate gaseous distention of the colon with moderate amount of stool in the right colon and rectum.  -Patient refused suppository previously  -Status post Fleet enema 03/29, small BM afterwards  -Had multiple bowel movements overnight 03/30-03/31  -resolved     Hypertension  -well controlled     Anemia of chronic disease  Stable no bleeding     GERD  -ppi, stable    Reported history of ankylosing spondylitis on chronic immunosuppression with methotrexate:  Now that infection has been addressed patient can resume methotrexate at discharge.  I recommended he follow-up with his prescribing provider and he is agreeable.  I discussed this with his daughter over the phone as well.    At the time of discharge patient was told to take all medications as prescribed, keep all follow-up appointments, and call their doctor or return to the hospital with any worsening or concerning symptoms.    Please note that this note was made using Dragon voice recognition software               Day of Discharge     Subjective: Feels well today.  Wants to transfer to SNF.  No new complaints.  Feels pretty good.    Vital Signs:   Temp:  [97.5 °F (36.4 °C)-98.3 °F (36.8 °C)] 98 °F (36.7 °C)  Heart Rate:  [70-75] 70  Resp:  [16-18] 16  BP: (108-134)/(57-67) 115/62     Physical Exam:  Constitutional:Awake, alert, no acute distress but chronically ill-appearing but nontoxic  HENT: NCAT, mucous membranes moist, neck supple  Respiratory: No cough or wheezes, nonlabored breathing   Cardiovascular: Pulse rate is normal, palpable radial pulses  Gastrointestinal: PEG is present, soft, nontender, nondistended  Musculoskeletal: Chronically debilitated in appearance, no lower extremity edema, BMI 24  Psychiatric: Appropriate affect, cooperative, conversational  Neurologic: No slurred speech or facial droop, follows commands  Skin: No rashes or jaundice, warm    Pertinent  and/or Most Recent Results     Results  from last 7 days   Lab Units 04/06/24  0501 04/05/24  0652 04/04/24  1608 04/04/24  0529 04/03/24  1623 04/03/24  0406   SODIUM mmol/L 139 141  --  141  --  144   POTASSIUM mmol/L 3.8 4.0 4.3 3.6 3.8 3.6   CHLORIDE mmol/L 107 108*  --  110*  --  112*   CO2 mmol/L 25.9 24.2  --  26.2  --  26.0   BUN mg/dL 31* 33*  --  28*  --  34*   CREATININE mg/dL 0.67* 0.83  --  0.69*  --  0.97   GLUCOSE mg/dL 126* 125*  --  116*  --  111*   CALCIUM mg/dL 8.8 8.6  --  8.6  --  8.2*     Brief Urine Lab Results  (Last result in the past 365 days)        Color   Clarity   Blood   Leuk Est   Nitrite   Protein   CREAT   Urine HCG        03/25/24 1918 Yellow   Turbid   Large (3+)   Large (3+)   Negative   >=300 mg/dL (3+)                         Imaging Results (All)       Procedure Component Value Units Date/Time    MRI Brain Without Contrast [[id number]] Collected: 03/29/24 2122     Updated: 03/29/24 2129    Narrative:      BRAIN MRI WITHOUT CONTRAST     HISTORY: Mental status change, unknown cause; R63.8-Other symptoms and  signs concerning food and fluid intake; N17.9-Acute kidney failure,  unspecified; R52-Pain, unspecified; R13.10-Dysphagia, unspecified;  E86.0-Dehydration; R79.89-Other specified abnormal findings of blood  chemistry; R62.7-Adult failure to thrive; R53.1-Weakness; R63.4-Abnormal  weight loss; N39.0-Urinary tract infection, site not specifi     COMPARISON: March 25, 2024.     FINDINGS:  Multiplanar images of the head were obtained without  gadolinium. No areas of restricted diffusion are seen to suggest acute  infarct. There is atrophy. There is extensive periventricular and deep  white matter microangiopathic change. There is no midline shift or mass  effect. Intracranial flow voids appear intact. Old lacunar infarcts are  noted within the left basal ganglia. No abnormality is seen on gradient  echo imaging. There may be some mucosal thickening within the ethmoid  sinuses.       Impression:      1. No acute  intracranial abnormality.     This report was finalized on 3/29/2024 9:26 PM by Dr. Kirsten Peña M.D on Workstation: [id number]       FL Video Swallow Single Contrast [[id number]] Collected: 03/28/24 1355     Updated: 03/28/24 1731    Narrative:      VIDEO SWALLOWING EXAMINATION BY SPEECH PATHOLOGY     Clinical: Dysphasia     Reference air kerma: 6.73 mGy     Video swallowing examination performed under the direction of speech  pathology. Imaging reviewed by radiologist who concurs with the  findings.     Speech pathology summary:      VFSS completed, Kristal SONG present. Aspiration was seen     By electronically signing this report, I, the supervising radiologist,  attest that I was not present for the procedure(s) but agree with the  final edited report..     Please refer to speech pathology report for further information.            This report was finalized on 3/28/2024 5:27 PM by Dr. Roddy Chaparro M.D  on Workstation: [id number]       CT Abdomen Pelvis Without Contrast [[id number]] Collected: 03/25/24 1957     Updated: 03/27/24 1729    Narrative:      CT OF THE ABDOMEN AND PELVIS WITHOUT CONTRAST 03/25/2024     HISTORY: Abdominal pain.     Spiral images were obtained from the lung bases to the symphysis pubis.  No intravenous or oral contrast was given.     There is streak artifact from patient's arms. The liver, gallbladder and  spleen appear unremarkable. Pancreas appears somewhat atrophic. The  adrenals appear unremarkable. Kidneys are obscured by streak artifact  but there may be some mild hydronephrosis bilaterally, greater on the  right than on the left and there may be at least 1 or 2 right renal  cysts including moderate size low-density lower pole right renal cyst.     There is moderate gaseous distention of the colon with moderate amount  of stool in the right colon. No small bowel dilatation is seen. Urinary  bladder is moderately distended. Multiple fluid collections are seen  adjacent to  the superior aspect of the urinary bladder and these may  represent multiple urinary bladder diverticula but are somewhat more  numerous than on the previous study of 01/23/2024. There is wall  thickening of the urinary bladder as well. Left hip prosthesis produces  streak artifact and obscures the pelvis as well.       Impression:      1. Some of the images are obscured by streak artifact from patient's  arms and left hip prosthesis.  2. Kidneys are somewhat obscured but there may be mild-to-moderate  bilateral hydronephrosis and at least 1 right renal cyst.  3. Moderate gaseous distention of the colon with moderate amount of  stool in the right colon and rectum.  4. 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 These appear more extensive than on the 01/23/2024  study. Please correlate for urinary bladder outlet obstruction/partial  obstruction. There could be an element of cystitis as well.     Radiation dose reduction techniques were utilized, including automated  exposure control and exposure modulation based on body size.        This report was finalized on 3/27/2024 5:26 PM by Dr. Dario Dumont M.D on Workstation: [id number]       CT Head Without Contrast [[id number]] Collected: 03/25/24 1835     Updated: 03/27/24 1241    Narrative:      CT OF THE BRAIN WITHOUT CONTRAST 03/25/2024     HISTORY: Mental status change.     Axial images were obtained through the brain without intravenous  contrast.     There is moderate diffuse atrophy. There is decreased attenuation of the  periventricular white matter bilaterally consistent with small vessel  white matter ischemic disease. There is no evidence of acute infarction,  hemorrhage, midline shift or mass effect.     No bony abnormalities are seen.       Impression:      No acute process identified.     Radiation dose reduction techniques were utilized, including automated  exposure control and exposure modulation based on  body size.        This report was finalized on 3/27/2024 12:38 PM by Dr. Dario Dumont M.D on Workstation: [id number]       XR Chest 1 View [[id number]] Collected: 03/25/24 1756     Updated: 03/25/24 1809    Narrative:      XR CHEST 1 VW-3/25/2024     HISTORY: Shortness of breath.     Heart size is within normal limits. Lungs appear free of acute  infiltrates. There is mild vascular congestion. There is some aortic  calcification.       Impression:      1. No acute process except for mild vascular congestion.        This report was finalized on 3/25/2024 6:06 PM by Dr. Dario Dumont M.D on Workstation: [id number]               Discharge Details        Discharge Medications        New Medications        Instructions Start Date   bisacodyl 5 MG EC tablet  Commonly known as: DULCOLAX   5 mg, Oral, Daily PRN      lansoprazole 30 MG Tablet Delayed Release Dispersible disintegrating tablet  Commonly known as: PREVACID SOLUTAB   30 mg, Per G Tube, Every Early Morning   Start Date: April 10, 2024     Menthol-Zinc Oxide 0.44-20.6 % ointment   1 Application, Topical, Every 12 Hours Scheduled      polyethylene glycol 17 g packet  Commonly known as: MIRALAX   1 packet daily per tube   Start Date: April 10, 2024     sennosides-docusate 8.6-50 MG per tablet  Commonly known as: PERICOLACE   2 tablets, Per G Tube, Daily   Start Date: April 10, 2024     terazosin 1 MG capsule  Commonly known as: HYTRIN   1 mg, Per G Tube, Nightly             Changes to Medications        Instructions Start Date   methotrexate 2.5 MG tablet  What changed: See the new instructions.   2.5 mg, Oral, Weekly, Take 2.5 mg on Wednesday and 2.5 mg on Thursday.  Do not take any medication on Friday through Tuesday.             Continue These Medications        Instructions Start Date   acetaminophen 325 MG tablet  Commonly known as: TYLENOL   650 mg, Oral, Every 4 Hours PRN      melatonin 3 MG tablet   3 mg, Oral, Nightly PRN      OMEGA 3 PO   1  capsule, Oral, Daily             Stop These Medications      atenolol-chlorthalidone 50-25 MG per tablet  Commonly known as: TENORETIC     finasteride 5 MG tablet  Commonly known as: PROSCAR     pantoprazole 40 MG EC tablet  Commonly known as: PROTONIX     tamsulosin 0.4 MG capsule 24 hr capsule  Commonly known as: FLOMAX              No Known Allergies      Discharge Disposition:  Skilled Nursing Facility (DC - External)    Diet:  Hospital:  Diet Order   Procedures    NPO Diet NPO Type: Tube Feeding       Activity:  Activity Instructions       Activity as Tolerated      Up WIth Assist                   CODE STATUS:    Code Status and Medical Interventions:   Ordered at: 03/25/24 1943     Code Status (Patient has no pulse and is not breathing):    CPR (Attempt to Resuscitate)     Medical Interventions (Patient has pulse or is breathing):    Full       Additional Instructions for the Follow-ups that You Need to Schedule       Discharge Follow-up with PCP   As directed       Currently Documented PCP:    Provider, No Known    PCP Phone Number:    [phone]     Follow Up Details: Recommend follow-up with your primary care provider or with the primary provider at your facility within 1 week for general hospital follow-up and blood pressure check.               Contact information for follow-up providers       Bennie Noel MD Follow up in 1 month(s).    Specialty: Urology  Why: For urine retention and hematuria  Contact information:  [address] [facility] [address] [phone]               Provider, No Known .    Why: Recommend follow-up with your primary care provider or with the primary provider at your facility within 1 week for general hospital follow-up and blood pressure check.  Contact information:  [facility] [address] [phone]                       Contact information for after-discharge care       Destination       [facility] .    Service:  Skilled Nursing  Contact information:  [address]  [facility] [address]  [phone]                                       Shola Betancourt MD  04/09/24      Time Spent on Discharge:  I spent greater than 40 minutes on this discharge activity which included: face-to-face encounter with the patient, reviewing the data in the system, coordination of the care with the nursing staff as well as consultants, documentation, and entering orders.        Electronically signed by Shola Betancourt MD at 04/09/24 0263

## 2024-04-13 NOTE — PAYOR COMM NOTE
"Anthony Gallegos (79 y.o. Male)        PLEASE SEE ATTACHED DC SUMMARY    REF#[id number]     THANK YOU    ANAND SHRESTHA LPN CCP   Date of Birth   1945    Social Security Number       Address   [address] [place] [address] [address] [place] [id number]    Home Phone   [phone]    MRN   [id number]       [facility]    Marital Status                               Admission Date   3/25/24    Admission Type   Emergency    Admitting Provider   Casie Reardon MD    Attending Provider       Department, Room/Bed   [address], P492/1       Discharge Date   2024    Discharge Disposition   Skilled Nursing Facility (DC - External)    Discharge Destination                                 Attending Provider: (none)   Allergies: No Known Allergies    Isolation: None   Infection: MRSA/History Only (23), ESBL E coli (24)   Code Status: Prior    Ht: 190.5 cm (75\")   Wt: 90.4 kg (199 lb 4.7 oz)    Admission Cmt: None   Principal Problem: UTI (urinary tract infection) [N39.0]                   Active Insurance as of 3/25/2024       Primary Coverage       Payor Plan Insurance Group Employer/Plan Group    AETNA MEDICARE REPLACEMENT AETNA MED ADV POS [id number]       Payor Plan Address Payor Plan Phone Number Payor Plan Fax Number Effective Dates    [address] [phone]  2023 - None Entered    [facility] [id number]         Subscriber Name Subscriber Birth Date Member ID       ANTHONY GALLEGOS 1945 [id number]                     Emergency Contacts        (Rel.) Home Phone Work Phone Mobile Phone    NATHALIE DON (Daughter) [phone] -- --    MAGY GALLEGOS [phone] -- --                 Discharge Summary        Shola Betancourt MD at 24 1809              [facility] Medicine Services  DISCHARGE SUMMARY    Patient Name: Anthony Gallegos  : 1945  MRN: [id number]    Date of Admission: 3/25/2024  4:13 PM  Date of " Discharge: 4/9/2024    Consults       Date and Time Order Name Status Description    4/2/2024 11:39 AM Inpatient ENT Consult      3/29/2024  2:19 PM Inpatient Infectious Diseases Consult Completed     3/28/2024  2:05 PM Inpatient General Surgery Consult Completed     3/26/2024  9:39 PM Inpatient Urology Consult      3/26/2024  7:31 AM Inpatient Urology Consult Completed     3/25/2024 11:39 PM Inpatient Nephrology Consult Completed     3/25/2024  7:18 PM [facility] (on-call MD unless specified) Details              Hospital Course       Active Hospital Problems    Diagnosis  POA    **UTI (urinary tract infection) [N39.0]  Yes    Failure to thrive in adult [R62.7]  Yes    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, Z79.899]  Not Applicable    Decreased oral intake [R63.8]  Yes    Dysphagia [R13.10]  Yes    BPH (benign prostatic hyperplasia) [N40.0]  Yes    GERD without esophagitis [K21.9]  Yes    Severe malnutrition [E43]  Yes    Generalized weakness [R53.1]  Yes    DISH (disseminated idiopathic skeletal hyperostosis) [M48.10]  Yes    Generalized pain [R52]  Yes    Immobility [Z74.09]  Yes    Hypertension [I10]  Yes    Ankylosing spondylitis of cervical region [M45.2]  Yes      Resolved Hospital Problems    Diagnosis Date Resolved POA    Encephalopathy [G93.40] 04/09/2024 Yes    MARTITA (acute kidney injury) [N17.9] 04/09/2024 Yes    Dehydration [E86.0] 04/09/2024 Yes          Hospital Course:  Anthony Gallegos is a 79 y.o. male with a history of HTN, BPH pyelonephritis/UTI, who presented to [facility] from facility for failure to thrive, not eating or drinking much, weight loss, decreased mobility.  He was treated for an ESBL urinary tract infection with secondary gross hematuria and urine retention.  He failed his voiding trial and will need to follow-up with urology outpatient.  His multifactorial MARTITA with bilateral hydronephrosis also likely due to multiple issues including urine retention/obstruction.  MARTITA  resolved with Forde catheter.  Due to failure to thrive and dysphagia patient is currently on a tube feeding diet.  He received a PEG tube placement this hospitalization.  He had constipation and is doing well on a bowel regimen.  He is now stabilized currently on his tube feeding and has completed antibiotic treatment for his ESBL urinary tract infection.  He has been cleared to discharge by all consultant teams and case management has arranged for discharge to SNF for subacute rehabilitation.  I discussed with his daughter today who is in agreement with the plan.        ESBL UTI  -completed a course of ertapenem at the direction of ID, no further issues     Gross hematuria/acute urinary retention  -felt to be secondary to UTI  -hematuria is resolved  -a1 blocker per tube  -he failed his voiding trial and his forde had to be replaced  -he will need to follow up with urology as an outpatient      MARTITA/bilateral hydronephrosis  -Creatinine 2.5 on admission, up from 0.66 on 01/29/2024  -CT scan showed mild-to-moderate bilateral hydronephrosis   -Status post Forde catheter placement 03/26/2024 per urology with resolution of MARTITA, nephrology has signed off as well     Hypernatremia  -improved with increased free water flushes, sodium level now normal     Multifactorial metabolic encephalopathy, resolved  -Likely secondary to UTI/dehydration/malnutrition  -No history of dementia per daughter, at baseline oriented x 3  -CT head with no acute findings  -Follow-up MRI brain 03/29/2024 showed old stroke but no acute findings  -resolved      Failure to thrive/  malnutrition/  decreased p.o. intake/  dysphagia requiring PEG tube placement  -speech therapy evaluated, underwent VFSS 03/28/2024, found to have diffuse to be profound generalized pharyngeal weakness, silent aspiration.  Strict n.p.o. recommended  -Palliative care evaluated for goals of care discussion, family wanted to proceed with PEG tube placement.  -Status post  PEG tube placement 03/29  -Continue tube feeds, tolerating     Constipation, resolved  -CT scan showed moderate gaseous distention of the colon with moderate amount of stool in the right colon and rectum.  -Patient refused suppository previously  -Status post Fleet enema 03/29, small BM afterwards  -Had multiple bowel movements overnight 03/30-03/31  -resolved     Hypertension  -well controlled     Anemia of chronic disease  Stable no bleeding     GERD  -ppi, stable    Reported history of ankylosing spondylitis on chronic immunosuppression with methotrexate:  Now that infection has been addressed patient can resume methotrexate at discharge.  I recommended he follow-up with his prescribing provider and he is agreeable.  I discussed this with his daughter over the phone as well.    At the time of discharge patient was told to take all medications as prescribed, keep all follow-up appointments, and call their doctor or return to the hospital with any worsening or concerning symptoms.    Please note that this note was made using Dragon voice recognition software               Day of Discharge     Subjective: Feels well today.  Wants to transfer to SNF.  No new complaints.  Feels pretty good.    Vital Signs:   Temp:  [97.5 °F (36.4 °C)-98.3 °F (36.8 °C)] 98 °F (36.7 °C)  Heart Rate:  [70-75] 70  Resp:  [16-18] 16  BP: (108-134)/(57-67) 115/62     Physical Exam:  Constitutional:Awake, alert, no acute distress but chronically ill-appearing but nontoxic  HENT: NCAT, mucous membranes moist, neck supple  Respiratory: No cough or wheezes, nonlabored breathing   Cardiovascular: Pulse rate is normal, palpable radial pulses  Gastrointestinal: PEG is present, soft, nontender, nondistended  Musculoskeletal: Chronically debilitated in appearance, no lower extremity edema, BMI 24  Psychiatric: Appropriate affect, cooperative, conversational  Neurologic: No slurred speech or facial droop, follows commands  Skin: No rashes or  jaundice, warm    Pertinent  and/or Most Recent Results     Results from last 7 days   Lab Units 04/06/24  0501 04/05/24  0652 04/04/24  1608 04/04/24  0529 04/03/24  1623 04/03/24  0406   SODIUM mmol/L 139 141  --  141  --  144   POTASSIUM mmol/L 3.8 4.0 4.3 3.6 3.8 3.6   CHLORIDE mmol/L 107 108*  --  110*  --  112*   CO2 mmol/L 25.9 24.2  --  26.2  --  26.0   BUN mg/dL 31* 33*  --  28*  --  34*   CREATININE mg/dL 0.67* 0.83  --  0.69*  --  0.97   GLUCOSE mg/dL 126* 125*  --  116*  --  111*   CALCIUM mg/dL 8.8 8.6  --  8.6  --  8.2*     Brief Urine Lab Results  (Last result in the past 365 days)        Color   Clarity   Blood   Leuk Est   Nitrite   Protein   CREAT   Urine HCG        03/25/24 1918 Yellow   Turbid   Large (3+)   Large (3+)   Negative   >=300 mg/dL (3+)                         Imaging Results (All)       Procedure Component Value Units Date/Time    MRI Brain Without Contrast [[id number]] Collected: 03/29/24 2122     Updated: 03/29/24 2129    Narrative:      BRAIN MRI WITHOUT CONTRAST     HISTORY: Mental status change, unknown cause; R63.8-Other symptoms and  signs concerning food and fluid intake; N17.9-Acute kidney failure,  unspecified; R52-Pain, unspecified; R13.10-Dysphagia, unspecified;  E86.0-Dehydration; R79.89-Other specified abnormal findings of blood  chemistry; R62.7-Adult failure to thrive; R53.1-Weakness; R63.4-Abnormal  weight loss; N39.0-Urinary tract infection, site not specifi     COMPARISON: March 25, 2024.     FINDINGS:  Multiplanar images of the head were obtained without  gadolinium. No areas of restricted diffusion are seen to suggest acute  infarct. There is atrophy. There is extensive periventricular and deep  white matter microangiopathic change. There is no midline shift or mass  effect. Intracranial flow voids appear intact. Old lacunar infarcts are  noted within the left basal ganglia. No abnormality is seen on gradient  echo imaging. There may be some mucosal thickening  within the ethmoid  sinuses.       Impression:      1. No acute intracranial abnormality.     This report was finalized on 3/29/2024 9:26 PM by Dr. Kirsten Peña M.D on Workstation: [id number]       FL Video Swallow Single Contrast [[id number]] Collected: 03/28/24 1355     Updated: 03/28/24 1731    Narrative:      VIDEO SWALLOWING EXAMINATION BY SPEECH PATHOLOGY     Clinical: Dysphasia     Reference air kerma: 6.73 mGy     Video swallowing examination performed under the direction of speech  pathology. Imaging reviewed by radiologist who concurs with the  findings.     Speech pathology summary:      VFSS completed, Kristal SONG present. Aspiration was seen     By electronically signing this report, I, the supervising radiologist,  attest that I was not present for the procedure(s) but agree with the  final edited report..     Please refer to speech pathology report for further information.            This report was finalized on 3/28/2024 5:27 PM by Dr. Roddy Chaparro M.D  on Workstation: [id number]       CT Abdomen Pelvis Without Contrast [[id number]] Collected: 03/25/24 1957     Updated: 03/27/24 1729    Narrative:      CT OF THE ABDOMEN AND PELVIS WITHOUT CONTRAST 03/25/2024     HISTORY: Abdominal pain.     Spiral images were obtained from the lung bases to the symphysis pubis.  No intravenous or oral contrast was given.     There is streak artifact from patient's arms. The liver, gallbladder and  spleen appear unremarkable. Pancreas appears somewhat atrophic. The  adrenals appear unremarkable. Kidneys are obscured by streak artifact  but there may be some mild hydronephrosis bilaterally, greater on the  right than on the left and there may be at least 1 or 2 right renal  cysts including moderate size low-density lower pole right renal cyst.     There is moderate gaseous distention of the colon with moderate amount  of stool in the right colon. No small bowel dilatation is seen. Urinary  bladder is  moderately distended. Multiple fluid collections are seen  adjacent to the superior aspect of the urinary bladder and these may  represent multiple urinary bladder diverticula but are somewhat more  numerous than on the previous study of 01/23/2024. There is wall  thickening of the urinary bladder as well. Left hip prosthesis produces  streak artifact and obscures the pelvis as well.       Impression:      1. Some of the images are obscured by streak artifact from patient's  arms and left hip prosthesis.  2. Kidneys are somewhat obscured but there may be mild-to-moderate  bilateral hydronephrosis and at least 1 right renal cyst.  3. Moderate gaseous distention of the colon with moderate amount of  stool in the right colon and rectum.  4. 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 These appear more extensive than on the 01/23/2024  study. Please correlate for urinary bladder outlet obstruction/partial  obstruction. There could be an element of cystitis as well.     Radiation dose reduction techniques were utilized, including automated  exposure control and exposure modulation based on body size.        This report was finalized on 3/27/2024 5:26 PM by Dr. Dario Dumont M.D on Workstation: [id number]       CT Head Without Contrast [[id number]] Collected: 03/25/24 1835     Updated: 03/27/24 1241    Narrative:      CT OF THE BRAIN WITHOUT CONTRAST 03/25/2024     HISTORY: Mental status change.     Axial images were obtained through the brain without intravenous  contrast.     There is moderate diffuse atrophy. There is decreased attenuation of the  periventricular white matter bilaterally consistent with small vessel  white matter ischemic disease. There is no evidence of acute infarction,  hemorrhage, midline shift or mass effect.     No bony abnormalities are seen.       Impression:      No acute process identified.     Radiation dose reduction techniques were utilized,  including automated  exposure control and exposure modulation based on body size.        This report was finalized on 3/27/2024 12:38 PM by Dr. Dario Dumont M.D on Workstation: [id number]       XR Chest 1 View [[id number]] Collected: 03/25/24 1756     Updated: 03/25/24 1809    Narrative:      XR CHEST 1 VW-3/25/2024     HISTORY: Shortness of breath.     Heart size is within normal limits. Lungs appear free of acute  infiltrates. There is mild vascular congestion. There is some aortic  calcification.       Impression:      1. No acute process except for mild vascular congestion.        This report was finalized on 3/25/2024 6:06 PM by Dr. Dario Dumont M.D on Workstation: [id number]               Discharge Details        Discharge Medications        New Medications        Instructions Start Date   bisacodyl 5 MG EC tablet  Commonly known as: DULCOLAX   5 mg, Oral, Daily PRN      lansoprazole 30 MG Tablet Delayed Release Dispersible disintegrating tablet  Commonly known as: PREVACID SOLUTAB   30 mg, Per G Tube, Every Early Morning   Start Date: April 10, 2024     Menthol-Zinc Oxide 0.44-20.6 % ointment   1 Application, Topical, Every 12 Hours Scheduled      polyethylene glycol 17 g packet  Commonly known as: MIRALAX   1 packet daily per tube   Start Date: April 10, 2024     sennosides-docusate 8.6-50 MG per tablet  Commonly known as: PERICOLACE   2 tablets, Per G Tube, Daily   Start Date: April 10, 2024     terazosin 1 MG capsule  Commonly known as: HYTRIN   1 mg, Per G Tube, Nightly             Changes to Medications        Instructions Start Date   methotrexate 2.5 MG tablet  What changed: See the new instructions.   2.5 mg, Oral, Weekly, Take 2.5 mg on Wednesday and 2.5 mg on Thursday.  Do not take any medication on Friday through Tuesday.             Continue These Medications        Instructions Start Date   acetaminophen 325 MG tablet  Commonly known as: TYLENOL   650 mg, Oral, Every 4 Hours PRN       melatonin 3 MG tablet   3 mg, Oral, Nightly PRN      OMEGA 3 PO   1 capsule, Oral, Daily             Stop These Medications      atenolol-chlorthalidone 50-25 MG per tablet  Commonly known as: TENORETIC     finasteride 5 MG tablet  Commonly known as: PROSCAR     pantoprazole 40 MG EC tablet  Commonly known as: PROTONIX     tamsulosin 0.4 MG capsule 24 hr capsule  Commonly known as: FLOMAX              No Known Allergies      Discharge Disposition:  Skilled Nursing Facility (DC - External)    Diet:  Hospital:  Diet Order   Procedures    NPO Diet NPO Type: Tube Feeding       Activity:  Activity Instructions       Activity as Tolerated      Up WIth Assist                   CODE STATUS:    Code Status and Medical Interventions:   Ordered at: 03/25/24 1943     Code Status (Patient has no pulse and is not breathing):    CPR (Attempt to Resuscitate)     Medical Interventions (Patient has pulse or is breathing):    Full       Additional Instructions for the Follow-ups that You Need to Schedule       Discharge Follow-up with PCP   As directed       Currently Documented PCP:    Provider, No Known    PCP Phone Number:    [phone]     Follow Up Details: Recommend follow-up with your primary care provider or with the primary provider at your facility within 1 week for general hospital follow-up and blood pressure check.               Contact information for follow-up providers       Bennie Noel MD Follow up in 1 month(s).    Specialty: Urology  Why: For urine retention and hematuria  Contact information:  [address] [facility] [address] [phone]               Provider, No Known .    Why: Recommend follow-up with your primary care provider or with the primary provider at your facility within 1 week for general hospital follow-up and blood pressure check.  Contact information:  [facility] [address] [phone]                       Contact information for after-discharge care        Destination       [facility] .    Service: Skilled Nursing  Contact information:  [address] [facility] [address]  [phone]                                       Shola Betancourt MD  04/09/24      Time Spent on Discharge:  I spent greater than 40 minutes on this discharge activity which included: face-to-face encounter with the patient, reviewing the data in the system, coordination of the care with the nursing staff as well as consultants, documentation, and entering orders.        Electronically signed by Shola Betancourt MD at 04/09/24 1534       Discharge Order (From admission, onward)       Start     Ordered    04/09/24 1506  Discharge patient  Once        Expected Discharge Date: 04/09/24   Discharge Disposition: Skilled Nursing Facility (DC - External)   Physician of Record for Attribution - Please select from Treatment Team: SHOLA BETANCOURT [[id number]]   Review needed by CMO to determine Physician of Record: No      Question Answer Comment   Physician of Record for Attribution - Please select from Treatment Team SHOLA BETANCOURT    Review needed by CMO to determine Physician of Record No        04/09/24 1518

## 2024-06-17 ENCOUNTER — HOSPITAL ENCOUNTER (INPATIENT)
Facility: HOSPITAL | Age: 79
LOS: 9 days | Discharge: NURSING, REHAB OR LONG-TERM CARE | End: 2024-06-27
Attending: EMERGENCY MEDICINE | Admitting: HOSPITALIST
Payer: MEDICARE

## 2024-06-17 ENCOUNTER — APPOINTMENT (OUTPATIENT)
Dept: GENERAL RADIOLOGY | Facility: HOSPITAL | Age: 79
End: 2024-06-17
Payer: MEDICARE

## 2024-06-17 DIAGNOSIS — N39.0 URINARY TRACT INFECTION ASSOCIATED WITH INDWELLING URETHRAL CATHETER, INITIAL ENCOUNTER: ICD-10-CM

## 2024-06-17 DIAGNOSIS — E87.1 HYPONATREMIA: ICD-10-CM

## 2024-06-17 DIAGNOSIS — T83.511A URINARY TRACT INFECTION ASSOCIATED WITH INDWELLING URETHRAL CATHETER, INITIAL ENCOUNTER: ICD-10-CM

## 2024-06-17 DIAGNOSIS — N17.9 ACUTE RENAL FAILURE, UNSPECIFIED ACUTE RENAL FAILURE TYPE: Primary | ICD-10-CM

## 2024-06-17 DIAGNOSIS — R33.8 ACUTE URINARY RETENTION: ICD-10-CM

## 2024-06-17 DIAGNOSIS — D64.9 CHRONIC ANEMIA: ICD-10-CM

## 2024-06-17 PROCEDURE — 99285 EMERGENCY DEPT VISIT HI MDM: CPT

## 2024-06-17 PROCEDURE — 36415 COLL VENOUS BLD VENIPUNCTURE: CPT

## 2024-06-17 RX ORDER — NITROFURANTOIN 25; 75 MG/1; MG/1
100 CAPSULE ORAL 2 TIMES DAILY
COMMUNITY
End: 2024-06-28 | Stop reason: HOSPADM

## 2024-06-17 RX ORDER — POTASSIUM CHLORIDE 20MEQ/15ML
20 LIQUID (ML) ORAL DAILY
COMMUNITY

## 2024-06-18 ENCOUNTER — APPOINTMENT (OUTPATIENT)
Dept: GENERAL RADIOLOGY | Facility: HOSPITAL | Age: 79
End: 2024-06-18
Payer: MEDICARE

## 2024-06-18 ENCOUNTER — APPOINTMENT (OUTPATIENT)
Dept: CT IMAGING | Facility: HOSPITAL | Age: 79
End: 2024-06-18
Payer: MEDICARE

## 2024-06-18 PROBLEM — N17.9 ARF (ACUTE RENAL FAILURE): Status: ACTIVE | Noted: 2024-06-18

## 2024-06-18 PROBLEM — N19 RENAL FAILURE: Status: ACTIVE | Noted: 2024-06-18

## 2024-06-18 PROBLEM — N39.0 UTI (URINARY TRACT INFECTION), BACTERIAL: Status: ACTIVE | Noted: 2024-06-18

## 2024-06-18 PROBLEM — A49.9 UTI (URINARY TRACT INFECTION), BACTERIAL: Status: ACTIVE | Noted: 2024-06-18

## 2024-06-18 LAB
ALBUMIN SERPL-MCNC: 2.9 G/DL (ref 3.5–5.2)
ALBUMIN/GLOB SERPL: 0.5 G/DL
ALP SERPL-CCNC: 78 U/L (ref 39–117)
ALT SERPL W P-5'-P-CCNC: 25 U/L (ref 1–41)
ANION GAP SERPL CALCULATED.3IONS-SCNC: 13.5 MMOL/L (ref 5–15)
ANION GAP SERPL CALCULATED.3IONS-SCNC: 14.5 MMOL/L (ref 5–15)
ANISOCYTOSIS BLD QL: ABNORMAL
AST SERPL-CCNC: 34 U/L (ref 1–40)
BACTERIA BLD CULT: ABNORMAL
BACTERIA ID TEST ISLT QL CULT: ABNORMAL
BACTERIA UR QL AUTO: ABNORMAL /HPF
BASOPHILS # BLD MANUAL: 0 10*3/MM3 (ref 0–0.2)
BASOPHILS NFR BLD MANUAL: 0 % (ref 0–1.5)
BILIRUB SERPL-MCNC: 0.6 MG/DL (ref 0–1.2)
BILIRUB UR QL STRIP: NEGATIVE
BOTTLE TYPE: ABNORMAL
BUN SERPL-MCNC: 103 MG/DL (ref 8–23)
BUN SERPL-MCNC: 89 MG/DL (ref 8–23)
BUN/CREAT SERPL: 24.9 (ref 7–25)
BUN/CREAT SERPL: 27.5 (ref 7–25)
CALCIUM SPEC-SCNC: 8.5 MG/DL (ref 8.6–10.5)
CALCIUM SPEC-SCNC: 9.1 MG/DL (ref 8.6–10.5)
CHLORIDE SERPL-SCNC: 96 MMOL/L (ref 98–107)
CHLORIDE SERPL-SCNC: 99 MMOL/L (ref 98–107)
CHLORIDE UR-SCNC: 33 MMOL/L
CLARITY UR: ABNORMAL
CO2 SERPL-SCNC: 20.5 MMOL/L (ref 22–29)
CO2 SERPL-SCNC: 20.5 MMOL/L (ref 22–29)
COLOR UR: ABNORMAL
CREAT SERPL-MCNC: 3.57 MG/DL (ref 0.76–1.27)
CREAT SERPL-MCNC: 3.75 MG/DL (ref 0.76–1.27)
D-LACTATE SERPL-SCNC: 2 MMOL/L (ref 0.5–2)
DEPRECATED RDW RBC AUTO: 45.2 FL (ref 37–54)
DEPRECATED RDW RBC AUTO: 46.7 FL (ref 37–54)
EGFRCR SERPLBLD CKD-EPI 2021: 15.6 ML/MIN/1.73
EGFRCR SERPLBLD CKD-EPI 2021: 16.5 ML/MIN/1.73
EOSINOPHIL # BLD MANUAL: 0.06 10*3/MM3 (ref 0–0.4)
EOSINOPHIL NFR BLD MANUAL: 1 % (ref 0.3–6.2)
ERYTHROCYTE [DISTWIDTH] IN BLOOD BY AUTOMATED COUNT: 15.3 % (ref 12.3–15.4)
ERYTHROCYTE [DISTWIDTH] IN BLOOD BY AUTOMATED COUNT: 15.7 % (ref 12.3–15.4)
GLOBULIN UR ELPH-MCNC: 5.4 GM/DL
GLUCOSE SERPL-MCNC: 111 MG/DL (ref 65–99)
GLUCOSE SERPL-MCNC: 94 MG/DL (ref 65–99)
GLUCOSE UR STRIP-MCNC: NEGATIVE MG/DL
HCT VFR BLD AUTO: 31.2 % (ref 37.5–51)
HCT VFR BLD AUTO: 31.7 % (ref 37.5–51)
HGB BLD-MCNC: 10.4 G/DL (ref 13–17.7)
HGB BLD-MCNC: 10.5 G/DL (ref 13–17.7)
HGB UR QL STRIP.AUTO: ABNORMAL
HYALINE CASTS UR QL AUTO: ABNORMAL /LPF
KETONES UR QL STRIP: ABNORMAL
LEUKOCYTE ESTERASE UR QL STRIP.AUTO: ABNORMAL
LYMPHOCYTES # BLD MANUAL: 0.92 10*3/MM3 (ref 0.7–3.1)
LYMPHOCYTES NFR BLD MANUAL: 10.3 % (ref 5–12)
MACROCYTES BLD QL SMEAR: ABNORMAL
MAGNESIUM SERPL-MCNC: 2.5 MG/DL (ref 1.6–2.4)
MCH RBC QN AUTO: 27.2 PG (ref 26.6–33)
MCH RBC QN AUTO: 27.2 PG (ref 26.6–33)
MCHC RBC AUTO-ENTMCNC: 33.1 G/DL (ref 31.5–35.7)
MCHC RBC AUTO-ENTMCNC: 33.3 G/DL (ref 31.5–35.7)
MCV RBC AUTO: 81.7 FL (ref 79–97)
MCV RBC AUTO: 82.1 FL (ref 79–97)
MONOCYTES # BLD: 0.61 10*3/MM3 (ref 0.1–0.9)
NEUTROPHILS # BLD AUTO: 4.36 10*3/MM3 (ref 1.7–7)
NEUTROPHILS NFR BLD MANUAL: 73.2 % (ref 42.7–76)
NITRITE UR QL STRIP: NEGATIVE
NRBC BLD AUTO-RTO: 0 /100 WBC (ref 0–0.2)
PH UR STRIP.AUTO: 5.5 [PH] (ref 5–8)
PLAT MORPH BLD: NORMAL
PLATELET # BLD AUTO: 248 10*3/MM3 (ref 140–450)
PLATELET # BLD AUTO: 281 10*3/MM3 (ref 140–450)
PMV BLD AUTO: 8.3 FL (ref 6–12)
PMV BLD AUTO: 8.6 FL (ref 6–12)
POIKILOCYTOSIS BLD QL SMEAR: ABNORMAL
POLYCHROMASIA BLD QL SMEAR: ABNORMAL
POTASSIUM SERPL-SCNC: 4.9 MMOL/L (ref 3.5–5.2)
POTASSIUM SERPL-SCNC: 5.7 MMOL/L (ref 3.5–5.2)
PROCALCITONIN SERPL-MCNC: 21.3 NG/ML (ref 0–0.25)
PROT SERPL-MCNC: 8.3 G/DL (ref 6–8.5)
PROT UR QL STRIP: ABNORMAL
RBC # BLD AUTO: 3.82 10*6/MM3 (ref 4.14–5.8)
RBC # BLD AUTO: 3.86 10*6/MM3 (ref 4.14–5.8)
RBC # UR STRIP: ABNORMAL /HPF
REF LAB TEST METHOD: ABNORMAL
SODIUM SERPL-SCNC: 130 MMOL/L (ref 136–145)
SODIUM SERPL-SCNC: 134 MMOL/L (ref 136–145)
SODIUM UR-SCNC: 44 MMOL/L
SP GR UR STRIP: 1.01 (ref 1–1.03)
SQUAMOUS #/AREA URNS HPF: ABNORMAL /HPF
URATE SERPL-MCNC: 9.1 MG/DL (ref 3.4–7)
UROBILINOGEN UR QL STRIP: ABNORMAL
VARIANT LYMPHS NFR BLD MANUAL: 1 % (ref 0–5)
VARIANT LYMPHS NFR BLD MANUAL: 14.4 % (ref 19.6–45.3)
WBC # UR STRIP: ABNORMAL /HPF
WBC MORPH BLD: NORMAL
WBC NRBC COR # BLD AUTO: 5.17 10*3/MM3 (ref 3.4–10.8)
WBC NRBC COR # BLD AUTO: 5.95 10*3/MM3 (ref 3.4–10.8)

## 2024-06-18 PROCEDURE — 82436 ASSAY OF URINE CHLORIDE: CPT

## 2024-06-18 PROCEDURE — 92610 EVALUATE SWALLOWING FUNCTION: CPT

## 2024-06-18 PROCEDURE — 25010000002 CEFTRIAXONE PER 250 MG: Performed by: PHYSICIAN ASSISTANT

## 2024-06-18 PROCEDURE — 70450 CT HEAD/BRAIN W/O DYE: CPT

## 2024-06-18 PROCEDURE — 84550 ASSAY OF BLOOD/URIC ACID: CPT

## 2024-06-18 PROCEDURE — 87102 FUNGUS ISOLATION CULTURE: CPT | Performed by: INTERNAL MEDICINE

## 2024-06-18 PROCEDURE — 87154 CUL TYP ID BLD PTHGN 6+ TRGT: CPT | Performed by: PHYSICIAN ASSISTANT

## 2024-06-18 PROCEDURE — 85025 COMPLETE CBC W/AUTO DIFF WBC: CPT | Performed by: PHYSICIAN ASSISTANT

## 2024-06-18 PROCEDURE — 74176 CT ABD & PELVIS W/O CONTRAST: CPT

## 2024-06-18 PROCEDURE — 25010000002 ERTAPENEM PER 500 MG: Performed by: PHYSICIAN ASSISTANT

## 2024-06-18 PROCEDURE — 85027 COMPLETE CBC AUTOMATED: CPT | Performed by: NURSE PRACTITIONER

## 2024-06-18 PROCEDURE — 84145 PROCALCITONIN (PCT): CPT | Performed by: PHYSICIAN ASSISTANT

## 2024-06-18 PROCEDURE — 87040 BLOOD CULTURE FOR BACTERIA: CPT | Performed by: PHYSICIAN ASSISTANT

## 2024-06-18 PROCEDURE — 87186 SC STD MICRODIL/AGAR DIL: CPT | Performed by: EMERGENCY MEDICINE

## 2024-06-18 PROCEDURE — 87086 URINE CULTURE/COLONY COUNT: CPT | Performed by: EMERGENCY MEDICINE

## 2024-06-18 PROCEDURE — 87186 SC STD MICRODIL/AGAR DIL: CPT | Performed by: PHYSICIAN ASSISTANT

## 2024-06-18 PROCEDURE — 71045 X-RAY EXAM CHEST 1 VIEW: CPT

## 2024-06-18 PROCEDURE — 25810000003 SODIUM CHLORIDE 0.9 % SOLUTION: Performed by: NURSE PRACTITIONER

## 2024-06-18 PROCEDURE — 25010000002 ERTAPENEM PER 500 MG: Performed by: HOSPITALIST

## 2024-06-18 PROCEDURE — 36415 COLL VENOUS BLD VENIPUNCTURE: CPT

## 2024-06-18 PROCEDURE — 84300 ASSAY OF URINE SODIUM: CPT

## 2024-06-18 PROCEDURE — 85007 BL SMEAR W/DIFF WBC COUNT: CPT | Performed by: PHYSICIAN ASSISTANT

## 2024-06-18 PROCEDURE — 25810000003 SODIUM CHLORIDE 0.9 % SOLUTION: Performed by: EMERGENCY MEDICINE

## 2024-06-18 PROCEDURE — 83735 ASSAY OF MAGNESIUM: CPT | Performed by: PHYSICIAN ASSISTANT

## 2024-06-18 PROCEDURE — 83605 ASSAY OF LACTIC ACID: CPT | Performed by: PHYSICIAN ASSISTANT

## 2024-06-18 PROCEDURE — 80053 COMPREHEN METABOLIC PANEL: CPT | Performed by: PHYSICIAN ASSISTANT

## 2024-06-18 PROCEDURE — 87077 CULTURE AEROBIC IDENTIFY: CPT | Performed by: EMERGENCY MEDICINE

## 2024-06-18 PROCEDURE — 81001 URINALYSIS AUTO W/SCOPE: CPT | Performed by: PHYSICIAN ASSISTANT

## 2024-06-18 RX ORDER — ACETAMINOPHEN 325 MG/1
650 TABLET ORAL EVERY 4 HOURS PRN
Status: DISCONTINUED | OUTPATIENT
Start: 2024-06-18 | End: 2024-06-18 | Stop reason: SDUPTHER

## 2024-06-18 RX ORDER — TERAZOSIN 1 MG/1
1 CAPSULE ORAL NIGHTLY
Status: DISCONTINUED | OUTPATIENT
Start: 2024-06-18 | End: 2024-06-28 | Stop reason: HOSPADM

## 2024-06-18 RX ORDER — CALCIUM CARBONATE 500 MG/1
2 TABLET, CHEWABLE ORAL 2 TIMES DAILY PRN
Status: DISCONTINUED | OUTPATIENT
Start: 2024-06-18 | End: 2024-06-28 | Stop reason: HOSPADM

## 2024-06-18 RX ORDER — BISACODYL 5 MG/1
5 TABLET, DELAYED RELEASE ORAL DAILY PRN
Status: DISCONTINUED | OUTPATIENT
Start: 2024-06-18 | End: 2024-06-28 | Stop reason: HOSPADM

## 2024-06-18 RX ORDER — HYDROCODONE BITARTRATE AND ACETAMINOPHEN 5; 325 MG/1; MG/1
1 TABLET ORAL EVERY 6 HOURS PRN
Status: DISCONTINUED | OUTPATIENT
Start: 2024-06-18 | End: 2024-06-28 | Stop reason: HOSPADM

## 2024-06-18 RX ORDER — SODIUM CHLORIDE 0.9 % (FLUSH) 0.9 %
10 SYRINGE (ML) INJECTION EVERY 12 HOURS SCHEDULED
Status: DISCONTINUED | OUTPATIENT
Start: 2024-06-18 | End: 2024-06-28 | Stop reason: HOSPADM

## 2024-06-18 RX ORDER — SODIUM CHLORIDE 0.9 % (FLUSH) 0.9 %
10 SYRINGE (ML) INJECTION AS NEEDED
Status: DISCONTINUED | OUTPATIENT
Start: 2024-06-18 | End: 2024-06-28 | Stop reason: HOSPADM

## 2024-06-18 RX ORDER — POLYETHYLENE GLYCOL 3350 17 G/17G
17 POWDER, FOR SOLUTION ORAL DAILY PRN
Status: DISCONTINUED | OUTPATIENT
Start: 2024-06-18 | End: 2024-06-28 | Stop reason: HOSPADM

## 2024-06-18 RX ORDER — BISACODYL 5 MG/1
5 TABLET, DELAYED RELEASE ORAL DAILY PRN
Status: DISCONTINUED | OUTPATIENT
Start: 2024-06-18 | End: 2024-06-18 | Stop reason: SDUPTHER

## 2024-06-18 RX ORDER — ACETAMINOPHEN 325 MG/1
650 TABLET ORAL EVERY 4 HOURS PRN
Status: DISCONTINUED | OUTPATIENT
Start: 2024-06-18 | End: 2024-06-28 | Stop reason: HOSPADM

## 2024-06-18 RX ORDER — ONDANSETRON 4 MG/1
4 TABLET, ORALLY DISINTEGRATING ORAL EVERY 6 HOURS PRN
Status: DISCONTINUED | OUTPATIENT
Start: 2024-06-18 | End: 2024-06-28 | Stop reason: HOSPADM

## 2024-06-18 RX ORDER — SODIUM CHLORIDE 9 MG/ML
40 INJECTION, SOLUTION INTRAVENOUS AS NEEDED
Status: DISCONTINUED | OUTPATIENT
Start: 2024-06-18 | End: 2024-06-28 | Stop reason: HOSPADM

## 2024-06-18 RX ORDER — AMOXICILLIN 250 MG
2 CAPSULE ORAL 2 TIMES DAILY PRN
Status: DISCONTINUED | OUTPATIENT
Start: 2024-06-18 | End: 2024-06-28 | Stop reason: HOSPADM

## 2024-06-18 RX ORDER — BISACODYL 10 MG
10 SUPPOSITORY, RECTAL RECTAL DAILY PRN
Status: DISCONTINUED | OUTPATIENT
Start: 2024-06-18 | End: 2024-06-28 | Stop reason: HOSPADM

## 2024-06-18 RX ORDER — ACETAMINOPHEN 650 MG/1
650 SUPPOSITORY RECTAL EVERY 4 HOURS PRN
Status: DISCONTINUED | OUTPATIENT
Start: 2024-06-18 | End: 2024-06-28 | Stop reason: HOSPADM

## 2024-06-18 RX ORDER — SODIUM CHLORIDE 9 MG/ML
125 INJECTION, SOLUTION INTRAVENOUS CONTINUOUS
Status: DISCONTINUED | OUTPATIENT
Start: 2024-06-18 | End: 2024-06-20

## 2024-06-18 RX ORDER — ONDANSETRON 2 MG/ML
4 INJECTION INTRAMUSCULAR; INTRAVENOUS EVERY 6 HOURS PRN
Status: DISCONTINUED | OUTPATIENT
Start: 2024-06-18 | End: 2024-06-28 | Stop reason: HOSPADM

## 2024-06-18 RX ORDER — ACETAMINOPHEN 160 MG/5ML
650 SOLUTION ORAL EVERY 4 HOURS PRN
Status: DISCONTINUED | OUTPATIENT
Start: 2024-06-18 | End: 2024-06-28 | Stop reason: HOSPADM

## 2024-06-18 RX ADMIN — SODIUM CHLORIDE 500 ML: 9 INJECTION, SOLUTION INTRAVENOUS at 02:15

## 2024-06-18 RX ADMIN — Medication 10 ML: at 20:36

## 2024-06-18 RX ADMIN — ERTAPENEM SODIUM 500 MG: 1 INJECTION, POWDER, LYOPHILIZED, FOR SOLUTION INTRAMUSCULAR; INTRAVENOUS at 14:16

## 2024-06-18 RX ADMIN — Medication 10 ML: at 03:30

## 2024-06-18 RX ADMIN — CEFTRIAXONE SODIUM 1000 MG: 1 INJECTION, POWDER, FOR SOLUTION INTRAMUSCULAR; INTRAVENOUS at 02:10

## 2024-06-18 RX ADMIN — SODIUM CHLORIDE 100 ML/HR: 9 INJECTION, SOLUTION INTRAVENOUS at 03:30

## 2024-06-18 RX ADMIN — ERTAPENEM SODIUM 1000 MG: 1 INJECTION, POWDER, LYOPHILIZED, FOR SOLUTION INTRAMUSCULAR; INTRAVENOUS at 02:51

## 2024-06-18 RX ADMIN — SODIUM CHLORIDE 100 ML/HR: 9 INJECTION, SOLUTION INTRAVENOUS at 08:44

## 2024-06-18 NOTE — ED PROVIDER NOTES
EMERGENCY DEPARTMENT MD ATTESTATION NOTE    Room Number:  35/35  PCP: Keshav Eason MD  Independent Historians: Patient, Family, and EMS    HPI:  A complete HPI/ROS/PMH/PSH/SH/FH are unobtainable due to: None    Chronic or social conditions impacting patient care (Social Determinants of Health): None      Context: Anthony Gallegos is a 79 y.o. male with a medical history of ankylosing spondylitis, acute kidney injury, hypertension who presents to the ED c/o acute pain.  EMS reports the patient was complaining of pain all over his body.  He has a history of chronic pain.  The nursing home nurse also notes that the patient had some abnormal speech but she has never taken care of him in the past and show she did not notice normal speech.  Son is here and states that his speech is normal for him and he is in his general normal state of health.  He reports he eats 2 meals a day and uses a feeding tube.  Son reports that he had his Mariscal catheter had a few days ago and had a UTI.        Review of prior external notes (non-ED) -and- Review of prior external test results outside of this encounter:  Discharge summary dated 4/9/2024 with a UTI and decreased mobility.    Prescription drug monitoring program review:           PHYSICAL EXAM    I have reviewed the triage vital signs and nursing notes.    ED Triage Vitals [06/17/24 2321]   Temp Heart Rate Resp BP SpO2   98.6 °F (37 °C) 110 22 108/68 96 %      Temp src Heart Rate Source Patient Position BP Location FiO2 (%)   Oral -- -- -- --       Physical Exam  GENERAL: Awake, alert, chronically ill-appearing  SKIN: Warm, dry  HENT: Normocephalic, atraumatic  EYES: no scleral icterus  CV: regular rhythm, tachycardic rate  RESPIRATORY: normal effort, lungs clear  ABDOMEN: soft, nontender, nondistended  MUSCULOSKELETAL: no deformity  NEURO: alert, moves all extremities, follows commands            MEDICATIONS GIVEN IN ER  Medications   sodium chloride 0.9 % flush 10 mL (has no  administration in time range)   sodium chloride 0.9 % flush 10 mL (has no administration in time range)   sodium chloride 0.9 % infusion 40 mL (has no administration in time range)   sodium chloride 0.9 % infusion (has no administration in time range)   acetaminophen (TYLENOL) tablet 650 mg (has no administration in time range)     Or   acetaminophen (TYLENOL) 160 MG/5ML oral solution 650 mg (has no administration in time range)     Or   acetaminophen (TYLENOL) suppository 650 mg (has no administration in time range)   sennosides-docusate (PERICOLACE) 8.6-50 MG per tablet 2 tablet (has no administration in time range)     And   polyethylene glycol (MIRALAX) packet 17 g (has no administration in time range)     And   bisacodyl (DULCOLAX) EC tablet 5 mg (has no administration in time range)     And   bisacodyl (DULCOLAX) suppository 10 mg (has no administration in time range)   ondansetron ODT (ZOFRAN-ODT) disintegrating tablet 4 mg (has no administration in time range)     Or   ondansetron (ZOFRAN) injection 4 mg (has no administration in time range)   calcium carbonate (TUMS) chewable tablet 500 mg (200 mg elemental) (has no administration in time range)   ertapenem (INVanz) 1,000 mg in sodium chloride 0.9 % 100 mL MBP (has no administration in time range)   HYDROcodone-acetaminophen (NORCO) 5-325 MG per tablet 1 tablet (has no administration in time range)   sodium chloride 0.9 % bolus 500 mL (0 mL Intravenous Stopped 6/18/24 0322)   cefTRIAXone (ROCEPHIN) 1,000 mg in sodium chloride 0.9 % 100 mL MBP (0 mg Intravenous Stopped 6/18/24 0247)   ertapenem (INVanz) 1,000 mg in sodium chloride 0.9 % 100 mL MBP (0 mg Intravenous Stopped 6/18/24 0322)         ORDERS PLACED DURING THIS VISIT:  Orders Placed This Encounter   Procedures    Blood Culture - Blood,    Blood Culture - Blood,    CANDIDA AURIS SCREEN - Swab, Axilla Right, Axilla Left and Groin    XR Chest 1 View    CT Head Without Contrast    CT Abdomen Pelvis  Without Contrast    Comprehensive Metabolic Panel    Urinalysis With Microscopic If Indicated (No Culture) - Urine, Catheter    Lactic Acid, Plasma    Procalcitonin    Magnesium    CBC Auto Differential    Manual Differential    Urinalysis, Microscopic Only - Urine, Clean Catch    Urinalysis With Culture If Indicated -    Basic Metabolic Panel    CBC (No Diff)    Diet: Regular/House; Fluid Consistency: Thin (IDDSI 0)    Vital Signs    Intake & Output    Weigh Patient    Oral Care    Saline Lock & Maintain IV Access    Place Sequential Compression Device    Maintain Sequential Compression Device    Code Status and Medical Interventions:    [facility] (on-call MD unless specified) Details    Inpatient Nephrology Consult    Insert Peripheral IV    Inpatient Admission    CBC & Differential         PROCEDURES  Procedures            PROGRESS, DATA ANALYSIS, CONSULTS, AND MEDICAL DECISION MAKING  All labs have been independently interpreted by me.  All radiology studies have been reviewed by me. All EKG's have been independently viewed and interpreted by me.  Discussion below represents my analysis of pertinent findings related to patient's condition, differential diagnosis, treatment plan and final disposition.    Differential diagnosis includes but is not limited to UTI, sepsis, dehydration, hypercalcemia.    Clinical Scores:                   ED Course as of 06/18/24 0436   Mon Jun 17, 2024   2354 I discussed the case with Dr. Crowley and they agree to evaluate the patient at the bedside.    [CC]   Tue Jun 18, 2024   0043 XR Chest 1 View  My independent interpretation of the imaging study is patchy airspace disease bilaterally [TR]   0105 Creatinine(!): 3.75 [TR]   0105 Hemoglobin(!): 10.5 [TR]   0113 Bacteria, UA(!): 4+ [TR]   0113 WBC, UA(!): Too Numerous to Count [TR]   0114 Lactate: 2.0 [TR]   0116 Procalcitonin(!): 21.30 [CC]   0119 I rechecked the patient.  I discussed the patient's labs, radiology findings (including  all incidental findings), diagnosis, and plan for admission. The patient understands and agrees with the plan.   [CC]   0230 Spoke with JAMES Lee with [facility].  Reviewed history, exam, results, treatments.  She agrees admit the patient to Dr. Samuels.  CT pending at the time of admission    [CC]   0312 CT Head Without Contrast  My independent interpretation of the CT of the head is no acute intracranial hemorrhage [CC]   0312 CT Abdomen Pelvis Without Contrast  My independent interpretation of the CT of the abdomen pelvis is largely distended bladder.  Will place Mariscal catheter [CC]      ED Course User Index  [CC] Kailey Verduzco PA-C  [TR] Juan Crowley MD       MDM: The patient is chronically ill-appearing.  Plan chemistries and blood counts as well as lactic acid.  He is tachycardic.  We will obtain laboratory values.  Will give some IV fluids.  He is acutely and chronically ill-appearing.  I suspect he will require admission.      COMPLEXITY OF CARE  The patient requires admission.    Please note that portions of this document were completed with a voice recognition program.    Note Disclaimer: At [facility], we believe that sharing information builds trust and better relationships. You are receiving this note because you recently visited [facility]. It is possible you will see health information before a provider has talked with you about it. This kind of information can be easy to misunderstand. To help you fully understand what it means for your health, we urge you to discuss this note with your provider.         Juan Crowley MD  06/18/24 0664

## 2024-06-18 NOTE — CONSULTS
"  [facility] Consult Note      Patient Name: Anthony Gallegos  : 1945  MRN: [id number]  Primary Care Physician:  Keshav Eason MD  Referring Physician: Juan Crowley MD  Date of admission: 2024    Subjective     Reason for Consult: MARTITA on CKD stage II    HPI:   Anthony Gallegos is a 79 y.o. male, admitted yesterday for generalized pain and altered mental status.  Patient presented to the ER with a Mariscal catheter that was not draining appropriately, urine looked like\" chocolate milk\", UA suggestive of UTI.  CT abdomen/pelvis suggested mild to moderate bilateral hydronephrosis and hydroureter with bladder distention; Mariscal was replaced in the ER with better urine outflow.  Patient was given IV antibiotics and a 500 cc bolus, currently on maintenance IVF at 100 cc/h.    From 3/25-, patient was hospitalized for UTI and MARTITA (peaked creatinine 2.5, 0.67 upon discharge).  Patient is currently obtunded, only grimaces or moans upon aggressive verbal and physical stimuli, unable to provide any reliable history/information.  Does not appear to be in any acute distress.    Other PMH includes: Hypertension, uveitis, ankylosing spondylitis.    Review of Systems:   Unable to assess    Personal History     Past Medical History:   Diagnosis Date    Abscess of scrotum     MARTITA (acute kidney injury)     Altered mental state     Arthritis     AS (ankylosing spondylitis)     History of MRSA infection     Hypertension     Leukocytosis     Tetrahydrocannabinol (THC) use disorder, mild, abuse 2023    Uveitis        Past Surgical History:   Procedure Laterality Date    COLONOSCOPY      ENDOSCOPY W/ PEG TUBE PLACEMENT N/A 3/29/2024    Procedure: ESOPHAGOGASTRODUODENOSCOPY WITH PERCUTANEOUS ENDOSCOPIC GASTROSTOMY TUBE INSERTION;  Surgeon: Khadar Broderick MD;  Location: [facility] ENDOSCOPY;  Service: General;  Laterality: N/A;  PRE/POST - DYSPHAGIA    ROTATOR CUFF REPAIR Right     TOTAL " HIP ARTHROPLASTY Left 2014       Family History: family history includes Alzheimer's disease in his mother.    Social History:  reports that he has never smoked. He has never used smokeless tobacco. He reports that he does not drink alcohol and does not use drugs.    Home Medications:  Prior to Admission medications    Medication Sig Start Date End Date Taking? Authorizing Provider   lansoprazole (PREVACID SOLUTAB) 30 MG Tablet Delayed Release Dispersible disintegrating tablet Administer 1 tablet per G tube Every Morning. 4/10/24  Yes Shola Betancourt MD   melatonin 3 MG tablet Take 1 tablet by mouth At Night As Needed for Sleep. 1/30/24  Yes Lakesha Sykes APRN   methotrexate 2.5 MG tablet Take 1 tablet by mouth 1 (One) Time Per Week. Take 2.5 mg on Wednesday and 2.5 mg on Thursday.  Do not take any medication on Friday through Tuesday. 4/9/24  Yes Shola Betancourt MD   nitrofurantoin, macrocrystal-monohydrate, (MACROBID) 100 MG capsule Take 1 capsule by mouth 2 (Two) Times a Day.   Yes Elodia Sánchez MD   Omega-3 Fatty Acids (OMEGA 3 PO) Take 1 capsule by mouth Daily.   Yes Elodia Sánchez MD   polyethylene glycol 17 g packet 1 packet daily per tube 4/10/24  Yes Shola Betancourt MD   potassium chloride (KAYCIEL) 20 mEq/15 mL solution Take 15 mL by mouth Daily.   Yes Elodia Sánchez MD   sennosides-docusate (PERICOLACE) 8.6-50 MG per tablet Administer 2 tablets per G tube Daily. 4/10/24  Yes Shola Betancourt MD   terazosin (HYTRIN) 1 MG capsule Administer 1 capsule per G tube Every Night. 4/9/24  Yes Shola Betancourt MD   acetaminophen (TYLENOL) 325 MG tablet Take 2 tablets by mouth Every 4 (Four) Hours As Needed for Mild Pain. 1/30/24   Lakesha Sykes APRN   bisacodyl (DULCOLAX) 5 MG EC tablet Take 1 tablet by mouth Daily As Needed for Constipation (Use if polyethylene glycol is ineffective). 4/9/24   Shola Betancourt MD    Menthol-Zinc Oxide 0.44-20.6 % ointment Apply 1 Application topically to the appropriate area as directed Every 12 (Twelve) Hours. 4/9/24   Shola Betancourt MD       Allergies:  No Known Allergies    Objective     Vitals:   Temp:  [97.5 °F (36.4 °C)-98.6 °F (37 °C)] 97.5 °F (36.4 °C)  Heart Rate:  [] 99  Resp:  [18-22] 18  BP: (104-129)/(57-80) 125/70    Intake/Output Summary (Last 24 hours) at 6/18/2024 1255  Last data filed at 6/18/2024 0723  Gross per 24 hour   Intake 600 ml   Output 1500 ml   Net -900 ml       Physical Exam:   Constitutional: Obtunded, chronically ill-appearing, cachectic, hard of hearing  HEENT: Sclera anicteric, no conjunctival injection, left hearing aid in place, oral mucosa is dry  Neck: Supple, no JVD  Respiratory: Clear to auscultation bilaterally, breathing effort nonlabored  Cardiovascular: Giller rate and rhythm, no rub  Gastrointestinal: Positive bowel sounds, abdomen is soft, no guarding and nondistended  : No palpable bladder, Mariscal catheter in place with cloudy urine  Musculoskeletal: No edema, no clubbing or cyanosis  Psychiatric: Unable to assess  Neurologic: Unable to assess  Skin: Warm and dry, scattered freckles       Scheduled Meds:     ertapenem, 500 mg, Intravenous, Q24H  sodium chloride, 10 mL, Intravenous, Q12H      IV Meds:   sodium chloride, 100 mL/hr, Last Rate: 100 mL/hr (06/18/24 0844)        Results Reviewed:   I have personally reviewed the results from the time of this admission to 6/18/2024 12:55 EDT     Lab Results   Component Value Date    GLUCOSE 94 06/18/2024    CALCIUM 8.5 (L) 06/18/2024     (L) 06/18/2024    K 4.9 06/18/2024    CO2 20.5 (L) 06/18/2024    CL 99 06/18/2024    BUN 89 (H) 06/18/2024    CREATININE 3.57 (H) 06/18/2024    EGFRIFAFRI 84 03/27/2018    EGFRIFNONA 69 03/27/2018    BCR 24.9 06/18/2024    ANIONGAP 14.5 06/18/2024      Lab Results   Component Value Date    MG 2.5 (H) 06/18/2024    PHOS 3.0 04/06/2024    ALBUMIN  2.9 (L) 06/18/2024           Assessment / Plan       UTI (urinary tract infection), bacterial    Hypertension    Immobility    Generalized pain    DISH (disseminated idiopathic skeletal hyperostosis)    Generalized weakness    Dysphagia    Failure to thrive in adult    ARF (acute renal failure)      ASSESSMENT:  Acute kidney injury secondary to urinary retention nonfunctional Mariscal catheter with bilateral hydronephrosis and UTI, slightly improving since admission.  Creatinine on 4/6/2024 was 0.67  Very mild hyponatremia, sodium 134  Hyperkalemia, secondary to MARTITA and urinary retention resolved potassium down to 4.9  Normal anion gap metabolic acidosis improving with the improvement of the urinary retention  BPH, Mariscal was replaced yesterday in the ER, on terazosin  Hypoalbuminemia, secondary to poor p.o. solute intake  Hypertension, BP well-controlled  Failure to thrive  Altered mental status    PLAN:  Agree with current treatment  Continue the same treatment  Surveillance labs    I reviewed the chart and other providers notes and reviewed labs.  I discussed the case with the patient and the patient voiced good understanding.    Thank you for involving us in the care of Anthony Gallegos.  Please feel free to call with any questions.    Thompson Novoa MD  06/18/24  12:55 EDT    [facility]  [phone]      Please note that portions of this note were completed with a voice recognition program.

## 2024-06-18 NOTE — CONSULTS
[facility]      Patient Identification:  NAME:  Anthony Gallegos  Age:  79 y.o.   Sex:  male   :  1945   MRN:  [id number]     Chief complaint: Generalized pain    History of present illness:      Pt is a 79 y.o. male with a history of BPH with LUTS, urinary retention, hydronephrosis, MARTITA, gross hematuria and urinary tract infections that presented to the ED on 24 from [facility] with complaints of generalized pain and abnormal speech. Pt was diagnosed and admitted with MARTITA, UTI and urinary retention. CT with findings of bladder distention. An indwelling catheter was placed with > 1200 cc of urine output. Urology was consulted for evaluation and treatment of bladder distention and moderate hydronephrosis. Patient currently sees Dr. Jimenez with [facility]. Is scheduled for an outpatient cystoscopy with Dr. Pinto on 7/15/24. Patient has a history of prior ESBL urinary tract infections. Receiving Invanz.     In hospital:  -AVSS, good UOP  -WBC - 5.17  -Creat - 3.57 (3.75)- 0.83 in April  -UA - Large LE, negative nitrites, 3+ blood, TNTC WBC, 4+ bacteria, 3-6 SE cells  -UCx - In process  -Blood culture - In process    -CT - Mild-moderate bilateral hydroureteronephrosis, no urolithiasis seen, significant urinary bladder distended, bladder diverticulum     Asked to see    Past medical history:  Past Medical History:   Diagnosis Date    Abscess of scrotum     MARTITA (acute kidney injury)     Altered mental state     Arthritis     AS (ankylosing spondylitis)     History of MRSA infection     Hypertension     Leukocytosis     Tetrahydrocannabinol (THC) use disorder, mild, abuse 2023    Uveitis        Past surgical history:  Past Surgical History:   Procedure Laterality Date    COLONOSCOPY      ENDOSCOPY W/ PEG TUBE PLACEMENT N/A 3/29/2024    Procedure: ESOPHAGOGASTRODUODENOSCOPY WITH PERCUTANEOUS ENDOSCOPIC GASTROSTOMY TUBE INSERTION;  Surgeon: Khadar Broderick MD;   Location: [facility] ENDOSCOPY;  Service: General;  Laterality: N/A;  PRE/POST - DYSPHAGIA    ROTATOR CUFF REPAIR Right     TOTAL HIP ARTHROPLASTY Left 2014       Allergies:  Patient has no known allergies.    Home medications:  Medications Prior to Admission   Medication Sig Dispense Refill Last Dose    lansoprazole (PREVACID SOLUTAB) 30 MG Tablet Delayed Release Dispersible disintegrating tablet Administer 1 tablet per G tube Every Morning.   6/17/2024    melatonin 3 MG tablet Take 1 tablet by mouth At Night As Needed for Sleep.   6/17/2024    methotrexate 2.5 MG tablet Take 1 tablet by mouth 1 (One) Time Per Week. Take 2.5 mg on Wednesday and 2.5 mg on Thursday.  Do not take any medication on Friday through Tuesday.   Past Week    nitrofurantoin, macrocrystal-monohydrate, (MACROBID) 100 MG capsule Take 1 capsule by mouth 2 (Two) Times a Day.   6/17/2024    Omega-3 Fatty Acids (OMEGA 3 PO) Take 1 capsule by mouth Daily.   6/17/2024    polyethylene glycol 17 g packet 1 packet daily per tube   6/17/2024    potassium chloride (KAYCIEL) 20 mEq/15 mL solution Take 15 mL by mouth Daily.   6/17/2024    sennosides-docusate (PERICOLACE) 8.6-50 MG per tablet Administer 2 tablets per G tube Daily.   6/17/2024    terazosin (HYTRIN) 1 MG capsule Administer 1 capsule per G tube Every Night.   6/17/2024    acetaminophen (TYLENOL) 325 MG tablet Take 2 tablets by mouth Every 4 (Four) Hours As Needed for Mild Pain.       bisacodyl (DULCOLAX) 5 MG EC tablet Take 1 tablet by mouth Daily As Needed for Constipation (Use if polyethylene glycol is ineffective).       Menthol-Zinc Oxide 0.44-20.6 % ointment Apply 1 Application topically to the appropriate area as directed Every 12 (Twelve) Hours.           Hospital medications:  ertapenem, 500 mg, Intravenous, Q24H  sodium chloride, 10 mL, Intravenous, Q12H      sodium chloride, 100 mL/hr, Last Rate: 100 mL/hr (06/18/24 0844)        acetaminophen **OR** acetaminophen **OR** acetaminophen     senna-docusate sodium **AND** polyethylene glycol **AND** bisacodyl **AND** bisacodyl    calcium carbonate    HYDROcodone-acetaminophen    ondansetron ODT **OR** ondansetron    sodium chloride    sodium chloride    Family history:  Family History   Problem Relation Age of Onset    Alzheimer's disease Mother        Social history:  Social History     Tobacco Use    Smoking status: Never    Smokeless tobacco: Never   Vaping Use    Vaping status: Never Used   Substance Use Topics    Alcohol use: No    Drug use: No       Review of systems:      12 point negative except as in HPI    Objective:  TMax 24 hours:   Temp (24hrs), Av.1 °F (36.7 °C), Min:97.5 °F (36.4 °C), Max:98.6 °F (37 °C)      Vitals Ranges:   Temp:  [97.5 °F (36.4 °C)-98.6 °F (37 °C)] 97.5 °F (36.4 °C)  Heart Rate:  [] 99  Resp:  [18-22] 18  BP: (104-129)/(57-80) 125/70    Intake/Output Last 3 shifts:  I/O last 3 completed shifts:  In: 600 [IV Piggyback:600]  Out: -      Physical Exam:    General Appearance:    Resting, NAD, chronically ill-appearing   :      Mariscal catheter in place draining orange urine with sediment   Neuro/Psych:   Not assessed       Results review:   I reviewed the patient's new clinical results.    Data review:  Lab Results (last 24 hours)       Procedure Component Value Units Date/Time    Sodium, Urine, Random - Indwelling Urethral Catheter [[id number]] Collected: 24 1018    Specimen: Urine from Indwelling Urethral Catheter Updated: 24 1141     Sodium, Urine 44 mmol/L     Narrative:      Reference intervals for random urine have not been established.  Clinical usage is dependent upon physician's interpretation in combination with other laboratory tests.       Chloride, Urine, Random - Indwelling Urethral Catheter [[id number]] Collected: 24 1018    Specimen: Urine from Indwelling Urethral Catheter Updated: 24 1141     Chloride, Urine 33 mmol/L     Narrative:      Reference intervals for random urine  have not been established.  Clinical usage is dependent upon physician's interpretation in combination with other laboratory tests.       NEHEMIAH AURIS SCREEN - Swab, Axilla Right, Axilla Left and Groin [[id number]] Collected: 06/18/24 1018    Specimen: Swab from Axilla Right, Axilla Left and Groin Updated: 06/18/24 1040    Uric Acid [[id number]]  (Abnormal) Collected: 06/18/24 0711    Specimen: Blood Updated: 06/18/24 0910     Uric Acid 9.1 mg/dL     Basic Metabolic Panel [[id number]]  (Abnormal) Collected: 06/18/24 0711    Specimen: Blood Updated: 06/18/24 0746     Glucose 94 mg/dL      BUN 89 mg/dL      Creatinine 3.57 mg/dL      Sodium 134 mmol/L      Potassium 4.9 mmol/L      Comment: Slight hemolysis detected by analyzer. Result may be falsely elevated.        Chloride 99 mmol/L      CO2 20.5 mmol/L      Calcium 8.5 mg/dL      BUN/Creatinine Ratio 24.9     Anion Gap 14.5 mmol/L      eGFR 16.5 mL/min/1.73     Narrative:      GFR Normal >60  Chronic Kidney Disease <60  Kidney Failure <15    The GFR formula is only valid for adults with stable renal function between ages 18 and 70.    CBC (No Diff) [[id number]]  (Abnormal) Collected: 06/18/24 0711    Specimen: Blood Updated: 06/18/24 0733     WBC 5.17 10*3/mm3      RBC 3.82 10*6/mm3      Hemoglobin 10.4 g/dL      Hematocrit 31.2 %      MCV 81.7 fL      MCH 27.2 pg      MCHC 33.3 g/dL      RDW 15.3 %      RDW-SD 45.2 fl      MPV 8.6 fL      Platelets 248 10*3/mm3     Urine Culture - Urine, Straight Cath [[id number]] Collected: 06/18/24 0034    Specimen: Urine from Straight Cath Updated: 06/18/24 0630    Manual Differential [[id number]]  (Abnormal) Collected: 06/18/24 0033    Specimen: Blood Updated: 06/18/24 0350     Neutrophil % 73.2 %      Lymphocyte % 14.4 %      Monocyte % 10.3 %      Eosinophil % 1.0 %      Basophil % 0.0 %      Atypical Lymphocyte % 1.0 %      Neutrophils Absolute 4.36 10*3/mm3      Lymphocytes Absolute 0.92 10*3/mm3      Monocytes Absolute 0.61  "10*3/mm3      Eosinophils Absolute 0.06 10*3/mm3      Basophils Absolute 0.00 10*3/mm3      Anisocytosis Slight/1+     Macrocytes Slight/1+     Poikilocytes Slight/1+     Polychromasia Slight/1+     WBC Morphology Normal     Platelet Morphology Normal    Blood Culture - Blood, Arm, Right [[id number]] Collected: 06/18/24 0200    Specimen: Blood from Arm, Right Updated: 06/18/24 0207    Blood Culture - Blood, Arm, Left [[id number]] Collected: 06/18/24 0200    Specimen: Blood from Arm, Left Updated: 06/18/24 0206    Urinalysis, Microscopic Only - Straight Cath [[id number]]  (Abnormal) Collected: 06/18/24 0034    Specimen: Urine from Straight Cath Updated: 06/18/24 0113     RBC, UA 6-10 /HPF      WBC, UA Too Numerous to Count /HPF      Bacteria, UA 4+ /HPF      Squamous Epithelial Cells, UA 3-6 /HPF      Hyaline Casts, UA 21-30 /LPF      Methodology Automated Microscopy    Procalcitonin [[id number]]  (Abnormal) Collected: 06/18/24 0033    Specimen: Blood Updated: 06/18/24 0111     Procalcitonin 21.30 ng/mL     Narrative:      As a Marker for Sepsis (Non-Neonates):    1. <0.5 ng/mL represents a low risk of severe sepsis and/or septic shock.  2. >2 ng/mL represents a high risk of severe sepsis and/or septic shock.    As a Marker for Lower Respiratory Tract Infections that require antibiotic therapy:    PCT on Admission    Antibiotic Therapy       6-12 Hrs later    >0.5                Strongly Recommended  >0.25 - <0.5        Recommended   0.1 - 0.25          Discouraged              Remeasure/reassess PCT  <0.1                Strongly Discouraged     Remeasure/reassess PCT    As 28 day mortality risk marker: \"Change in Procalcitonin Result\" (>80% or <=80%) if Day 0 (or Day 1) and Day 4 values are available. Refer to http://[email or web address]-pct-calculator.com    Change in PCT <=80%  A decrease of PCT levels below or equal to 80% defines a positive change in PCT test result representing a higher risk for 28-day all-cause mortality of " patients diagnosed with severe sepsis for septic shock.    Change in PCT >80%  A decrease of PCT levels of more than 80% defines a negative change in PCT result representing a lower risk for 28-day all-cause mortality of patients diagnosed with severe sepsis or septic shock.       Urinalysis With Microscopic If Indicated (No Culture) - Straight Cath [[id number]]  (Abnormal) Collected: 06/18/24 0034    Specimen: Urine from Straight Cath Updated: 06/18/24 0105     Color, UA Dark Yellow     Appearance, UA Turbid     pH, UA 5.5     Specific Gravity, UA 1.015     Glucose, UA Negative     Ketones, UA Trace     Bilirubin, UA Negative     Blood, UA Large (3+)     Protein,  mg/dL (2+)     Leuk Esterase, UA Large (3+)     Nitrite, UA Negative     Urobilinogen, UA 0.2 E.U./dL    Comprehensive Metabolic Panel [[id number]]  (Abnormal) Collected: 06/18/24 0033    Specimen: Blood Updated: 06/18/24 0104     Glucose 111 mg/dL       mg/dL      Creatinine 3.75 mg/dL      Sodium 130 mmol/L      Potassium 5.7 mmol/L      Chloride 96 mmol/L      CO2 20.5 mmol/L      Calcium 9.1 mg/dL      Total Protein 8.3 g/dL      Albumin 2.9 g/dL      ALT (SGPT) 25 U/L      AST (SGOT) 34 U/L      Alkaline Phosphatase 78 U/L      Total Bilirubin 0.6 mg/dL      Globulin 5.4 gm/dL      A/G Ratio 0.5 g/dL      BUN/Creatinine Ratio 27.5     Anion Gap 13.5 mmol/L      eGFR 15.6 mL/min/1.73     Narrative:      GFR Normal >60  Chronic Kidney Disease <60  Kidney Failure <15    The GFR formula is only valid for adults with stable renal function between ages 18 and 70.    Magnesium [[id number]]  (Abnormal) Collected: 06/18/24 0033    Specimen: Blood Updated: 06/18/24 0104     Magnesium 2.5 mg/dL     CBC & Differential [[id number]]  (Abnormal) Collected: 06/18/24 0033    Specimen: Blood Updated: 06/18/24 0104    Narrative:      The following orders were created for panel order CBC & Differential.  Procedure                               Abnormality          Status                     ---------                               -----------         ------                     CBC Auto Differential[[id number]]        Abnormal            Final result                 Please view results for these tests on the individual orders.    CBC Auto Differential [[id number]]  (Abnormal) Collected: 06/18/24 0033    Specimen: Blood Updated: 06/18/24 0104     WBC 5.95 10*3/mm3      RBC 3.86 10*6/mm3      Hemoglobin 10.5 g/dL      Hematocrit 31.7 %      MCV 82.1 fL      MCH 27.2 pg      MCHC 33.1 g/dL      RDW 15.7 %      RDW-SD 46.7 fl      MPV 8.3 fL      Platelets 281 10*3/mm3      nRBC 0.0 /100 WBC     Lactic Acid, Plasma [[id number]]  (Normal) Collected: 06/18/24 0033    Specimen: Blood Updated: 06/18/24 0101     Lactate 2.0 mmol/L              Imaging:  Imaging Results (Last 24 Hours)       Procedure Component Value Units Date/Time    CT Abdomen Pelvis Without Contrast [[id number]] Collected: 06/18/24 0406     Updated: 06/18/24 0406    Narrative:        Patient: JADE MARAVILLA  Time Out: 04:06  Exam(s): CT ABDOMEN + PELVIS Without Contrast     EXAM:    CT Abdomen and Pelvis Without Intravenous Contrast    CLINICAL HISTORY:     Reason for exam: abdominal pain, renal failure.    TECHNIQUE:    Axial computed tomography images of the abdomen and pelvis without   intravenous contrast.  CTDI is 15.69 mGy and DLP is 827.7 mGy-cm.  This   CT exam was performed according to the principle of ALARA (As Low As   Reasonably Achievable) by using one or more of the following dose   reduction techniques: automated exposure control, adjustment of the mA   and or kV according to patient size, and or use of iterative   reconstruction technique.    COMPARISON:    March 25, 2024 scattered fibrotic changes in the lung bases.    FINDINGS:    Lung bases:  Unremarkable.  No mass.  No consolidation.     ABDOMEN:    Liver:  Unremarkable.    Gallbladder and bile ducts:  Unremarkable.  No calcified stones.  No    ductal dilation.    Pancreas:  Unremarkable.  No ductal dilation.    Spleen:  Unremarkable.  No splenomegaly.    Adrenals:  Unremarkable.  No mass.    Kidneys and ureters:  Mild to moderate bilateral hydronephrosis and   hydroureter.  No ureterolithiasis is seen.  Likely urinary retention.  5.  3 cm simple cyst in the lower pole the right kidney.  No follow-up is   required.    Stomach and bowel:  Gaseous distention of the transverse colon.    Possible mild colonic ileus.  The sigmoid colon is partially compressed   by the enlarged urinary bladder.  No mucosal thickening.     PELVIS:    Appendix:  No findings to suggest acute appendicitis.    Bladder:  There is dilation of the urinary bladder measuring 20 cm   craniocaudad.  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    Reproductive:  Unremarkable as visualized.     ABDOMEN and PELVIS:    Intraperitoneal space:  Unremarkable.  No free air.  No significant   fluid collection.    Bones joints:  There is metallic artifact from a left hip arthroplasty.    No acute fracture or dislocation is seen.  Ankylosis of the spine.  No   acute fracture is seen.    Soft tissues:  Unremarkable.    Vasculature:  Unremarkable.  No abdominal aortic aneurysm.    Lymph nodes:  Unremarkable.  No enlarged lymph nodes.    Tubes, lines and devices:  There is a PEG tube in the stomach.  The   stomach is decompressed.    IMPRESSION:       1.  There is dilation of the urinary bladder measuring 20 cm craniocaudad.    There is irregular wall thickening with a 7 cm bladder diverticulum   near the vertex, similar to previous.  Consider chronic bladder outlet   obstruction and urinary retention.  2.  Mild to moderate bilateral hydronephrosis and hydroureter.  No   ureterolithiasis is seen.  Likely urinary retention.  3.  Gaseous distention of the transverse colon.  Possible mild colonic   ileus.  The sigmoid colon is partially  compressed by the enlarged urinary   bladder.      Impression:          Electronically signed by Haider Kuhn MD on 06-18-24 at 0406    CT Head Without Contrast [[id number]] Collected: 06/18/24 0302     Updated: 06/18/24 0302    Narrative:        Patient: JADE MARAVILLA  Time Out: 03:01  Exam(s): CT HEAD Without Contrast     EXAM:    CT Head Without Intravenous Contrast    CLINICAL HISTORY:     Reason for exam: AMS.    TECHNIQUE:    Axial computed tomography images of the head brain without intravenous   contrast.  CTDI is 55.7 mGy and DLP is 1018.9 mGy-cm.  This CT exam was   performed according to the principle of ALARA (As Low As Reasonably   Achievable) by using one or more of the following dose reduction   techniques: automated exposure control, adjustment of the mA and or kV   according to patient size, and or use of iterative reconstruction   technique.    COMPARISON:    3.29.24    FINDINGS:    Brain:  No hemorrhage, herniation, or mass effect.  Chronic   microvascular ischemic changes.    Ventricles:  No hydrocephalus.  Age related cerebral volume loss.    Bones joints:  Unremarkable.    Soft tissues:  Unremarkable.    Sinuses:  No air fluid levels.    Mastoid air cells:  Clear.    IMPRESSION:       No acute hemorrhage, hydrocephalus, or mass effect.      Impression:          Electronically signed by Manasa Reina MD on 06-18-24 at 0301    XR Chest 1 View [[id number]] Collected: 06/18/24 0231     Updated: 06/18/24 0231    Narrative:        Patient: JADE MARAVILLA  Time Out: 02:31  Exam(s): XR CXR 1 VIEW     EXAM:    XR Chest, 1 View    CLINICAL HISTORY:     Reason for exam: AMS.    TECHNIQUE:    Frontal view of the chest.    COMPARISON:  3 25 2024    FINDINGS:    See Impression.    IMPRESSION:    Prominent interstitial markings are seen.  Correlate with edema or   infection.  No effusion.  Unchanged heart size.    Impression:          Electronically signed by Manasa Reina MD on 06-18-24 at 0231                Assessment:     Urinary retention  Urinary tract infection  Bilateral hydronephrosis  MARTITA  BPH with LUTS    Plan:     - No acute urologic surgical intervention planned during admission  - Continue indwelling forde catheter during admission and at discharge. Recommend keeping indwelling forde until seen for outpatient cystoscopy  - Continue IV Invanz- urine and blood cultures pending. Recommend tailoring antibiotic selection based on results of culture data  - Continue Terazosin  - Recommend renal ultrasound in 2-3 days to assess for resolution of hydronephrosis  - Urology will follow remotely. Call for any questions, concerns, or clinical change    Shaylee Rodriguez, NOHEMI  06/18/24  12:03 EDT    Plan reviewed and discussed with Dr. Lopez

## 2024-06-18 NOTE — NURSING NOTE
06/18/24 1345   Wound 12/20/23 1000 Bilateral coccyx   Placement Date/Time: 12/20/23 1000   Present on Original Admission: Yes  Side: Bilateral  Location: coccyx   Pressure Injury Stage 4   Dressing Appearance moist drainage   Base non-blanchable;black eschar;necrotic;slough   Periwound excoriated;moist;redness   Periwound Temperature warm   Periwound Skin Turgor soft   Wound Length (cm) 7 cm   Wound Width (cm) 9 cm   Wound Surface Area (cm^2) 63 cm^2   Wound 06/18/24 1104 Left posterior heel   Placement Date/Time: 06/18/24 1104   Side: Left  Orientation: posterior  Location: heel   Pressure Injury Stage 4   Dressing Appearance moist drainage   Base moist;necrotic;slough   Periwound excoriated;moist;swelling;redness   Periwound Temperature warm   Periwound Skin Turgor soft   Edges open   Wound Length (cm) 7 cm   Wound Width (cm) 9 cm   Wound Surface Area (cm^2) 63 cm^2   Drainage Characteristics/Odor malodorous;serosanguineous   Drainage Amount small   Wound 06/18/24 1345 lumbar spine Pressure Injury   Placement Date/Time: 06/18/24 1345   Present on Original Admission: Yes  Location: lumbar spine  Primary Wound Type: Pressure Injury   Pressure Injury Stage 1   Base non-blanchable;red   Periwound redness   Periwound Temperature warm   Periwound Skin Turgor soft   Wound Length (cm)   (several area over the bone reddened, not blanchable)   Wound 06/18/24 1345 Right scapula Pressure Injury   Placement Date/Time: 06/18/24 1345   Present on Original Admission: Yes  Side: Right  Location: scapula  Primary Wound Type: Pressure Injury   Pressure Injury Stage 1   Base non-blanchable;red   Periwound redness   Periwound Temperature warm   Periwound Skin Turgor soft   Wound Length (cm) 2 cm   Wound Width (cm) 2 cm   Wound Surface Area (cm^2) 4 cm^2   Wound 06/18/24 1345 Right lateral foot Pressure Injury   Placement Date/Time: 06/18/24 1345   Present on Original Admission: Yes  Side: Right  Orientation: lateral  Location:  foot  Primary Wound Type: Pressure Injury   Pressure Injury Stage DTPI   Base non-blanchable;maroon/purple   Periwound redness   Periwound Temperature warm   Periwound Skin Turgor soft   Wound Length (cm) 2 cm   Wound Width (cm) 1 cm   Wound Surface Area (cm^2) 2 cm^2   Wound 06/18/24 Right ankle Pressure Injury   Placement Date: 06/18/24   Present on Original Admission: Yes  Side: Right  Location: ankle  Primary Wound Type: Pressure Injury   Pressure Injury Stage DTPI   Base non-blanchable;maroon/purple   Periwound redness;swelling   Periwound Temperature warm   Periwound Skin Turgor soft   Wound Length (cm) 1 cm   Wound Width (cm) 1 cm   Wound Surface Area (cm^2) 1 cm^2   Wound 06/18/24 1345 Right heel Pressure Injury   Placement Date/Time: 06/18/24 1345   Present on Original Admission: Yes  Side: Right  Location: heel  Primary Wound Type: Pressure Injury   Pressure Injury Stage DTPI   Base non-blanchable;maroon/purple   Periwound redness;swelling   Periwound Temperature warm   Periwound Skin Turgor soft   Wound Length (cm) 3 cm   Wound Width (cm) 4 cm   Wound Surface Area (cm^2) 12 cm^2   Skin Interventions   Pressure Reduction Devices specialty bed utilized  ([facility])   Pressure Reduction Techniques heels elevated off bed;positioned off wounds;pressure points protected   Skin Protection silicone foam dressing in place     Wound/Ostomy: We see a patient at the request of the floor nurse regarding skin issue on Coccyx and left Heel. Once examined, multiple pressure injuries in different stages are observed, wound bed described above. Betadine moist gauze was ordered to left heel and Sacrococcygeal area Pressure injury unstageable, Venelex ointment to Lumbar Spine area, Rt Scapula, pressure injury stage 1 and  rt Ankle, rt lat Foot and rt heel,  DTI all present at admission.  Wound care order and pressure ulcer preventives  measures have been implemented into Epic.   Low air loss mattress for adequate pressure  redistribution and pressure relief from Agility and frame from EVS will be requested, floor nurse will call.  He is at risk for further skin problems, is completely bedridden, contracted, impaired mobility and is incontinent, repositioning him every two hours and minimizing any skin contact with urine or stool would be beneficial.  Please re-consult for any additional needs.

## 2024-06-18 NOTE — ED NOTES
Patient presents to ED from [facility]. Nurse called EMS for patient reporting pain all over as well as abnormal speech. Nurse had never had this patient before so she was unsure if this speech was normal or not.

## 2024-06-18 NOTE — H&P
HISTORY AND PHYSICAL   [facility]        Date of Admission: 2024  Patient Identification:  Name: Anthony Gallegos  Age: 79 y.o.  Sex: male  :  1945  MRN: [id number]                     Primary Care Physician: Keshav Eason MD    Chief Complaint: Brought here for complaints of acute pain    History of Present Illness:   Mr. Gallegos is an 79-year-old gentleman who is a facility resident was brought here by EMS due to complaints of chronic pain and hurting all over.  I am not exactly sure how this was conveyed is currently he is not really verbal at all.  When I ask him questions he grunts at me.  He is certainly not conversational and his overall body habitus is very concerning his appears nearly [place] with a cachectic style appearance.  Mariscal catheter was placed as he was found to be in acute renal failure and patient was administered Rocephin then Invanz as well as a 500 cc bolus in the ER.  I am not able to get any further history out of this gentleman and otherwise relied on chart review.  He does have a G-tube but apparently takes a modified diet utilizing tube for medications at times.  Per review of the chart there is a son that is involved but no one is currently at bedside when I evaluated the patient in ER room 35.  Patient was apparently already on nitrofurantoin for UTI prior to admission.    Past Medical History:  Past Medical History:   Diagnosis Date    Abscess of scrotum     MARTITA (acute kidney injury)     Altered mental state     Arthritis     AS (ankylosing spondylitis)     History of MRSA infection     Hypertension     Leukocytosis     Tetrahydrocannabinol (THC) use disorder, mild, abuse 2023    Uveitis      Past Surgical History:  Past Surgical History:   Procedure Laterality Date    COLONOSCOPY      ENDOSCOPY W/ PEG TUBE PLACEMENT N/A 3/29/2024    Procedure: ESOPHAGOGASTRODUODENOSCOPY WITH PERCUTANEOUS ENDOSCOPIC GASTROSTOMY TUBE INSERTION;  Surgeon: Meggan  Khadar Díaz MD;  Location: [facility] ENDOSCOPY;  Service: General;  Laterality: N/A;  PRE/POST - DYSPHAGIA    ROTATOR CUFF REPAIR Right     TOTAL HIP ARTHROPLASTY Left 2014      Home Meds:  Medications Prior to Admission   Medication Sig Dispense Refill Last Dose    lansoprazole (PREVACID SOLUTAB) 30 MG Tablet Delayed Release Dispersible disintegrating tablet Administer 1 tablet per G tube Every Morning.   6/17/2024    melatonin 3 MG tablet Take 1 tablet by mouth At Night As Needed for Sleep.   6/17/2024    methotrexate 2.5 MG tablet Take 1 tablet by mouth 1 (One) Time Per Week. Take 2.5 mg on Wednesday and 2.5 mg on Thursday.  Do not take any medication on Friday through Tuesday.   Past Week    nitrofurantoin, macrocrystal-monohydrate, (MACROBID) 100 MG capsule Take 1 capsule by mouth 2 (Two) Times a Day.   6/17/2024    Omega-3 Fatty Acids (OMEGA 3 PO) Take 1 capsule by mouth Daily.   6/17/2024    polyethylene glycol 17 g packet 1 packet daily per tube   6/17/2024    potassium chloride (KAYCIEL) 20 mEq/15 mL solution Take 15 mL by mouth Daily.   6/17/2024    sennosides-docusate (PERICOLACE) 8.6-50 MG per tablet Administer 2 tablets per G tube Daily.   6/17/2024    terazosin (HYTRIN) 1 MG capsule Administer 1 capsule per G tube Every Night.   6/17/2024    acetaminophen (TYLENOL) 325 MG tablet Take 2 tablets by mouth Every 4 (Four) Hours As Needed for Mild Pain.       bisacodyl (DULCOLAX) 5 MG EC tablet Take 1 tablet by mouth Daily As Needed for Constipation (Use if polyethylene glycol is ineffective).       Menthol-Zinc Oxide 0.44-20.6 % ointment Apply 1 Application topically to the appropriate area as directed Every 12 (Twelve) Hours.          Allergies:  No Known Allergies  Immunizations:  Immunization History   Administered Date(s) Administered    COVID-19 (MODERNA) BIVALENT 12+YRS 09/27/2022    COVID-19 (MODERNA) Monovalent Original Booster 12/02/2021    COVID-19 (PFIZER) Purple Cap Monovalent 04/29/2021,  "2021    Fluad Quad 65+ 10/07/2023    Pneumococcal Conjugate 13-Valent (PCV13) 2019    Pneumococcal Polysaccharide (PPSV23) 2020    Shingrix 2018    Tdap 2009, 2015, 2020     Social History:   Social History     Social History Narrative    Not on file     Social History     Socioeconomic History    Marital status:     Number of children: 2   Tobacco Use    Smoking status: Never    Smokeless tobacco: Never   Vaping Use    Vaping status: Never Used   Substance and Sexual Activity    Alcohol use: No    Drug use: No    Sexual activity: Defer       Family History:  Family History   Problem Relation Age of Onset    Alzheimer's disease Mother         Review of Systems  See history of present illness and past medical history.  Completely unobtainable for me at this time.  Remainder of ROS is negative.    Objective:  T Max 24 hrs: Temp (24hrs), Av.6 °F (37 °C), Min:98.6 °F (37 °C), Max:98.6 °F (37 °C)    Vitals Ranges:   Temp:  [98.6 °F (37 °C)] 98.6 °F (37 °C)  Heart Rate:  [] 99  Resp:  [22] 22  BP: (104-129)/(57-80) 104/61      Exam:  /61   Pulse 99   Temp 98.6 °F (37 °C) (Oral)   Resp 22   Ht 190.5 cm (75\")   Wt 63.1 kg (139 lb 1.6 oz)   SpO2 98%   BMI 17.39 kg/m²     General Appearance:    Alert, nonthriving cachectic patient, grunts with answers, no family present, chronically ill-appearing a difficult to tell what is acute and what is chronic   Head:    Normocephalic, bitemporal wasting, atraumatic   Eyes:  Wearing some thick dark shades   Ears:    Normal external ear canals, both ears   Nose:   Nares normal, septum midline, mucosa normal, no drainage    or sinus tenderness   Throat: Dry mucous membranes, cheeks sunken   Neck:   Supple, no meningismus or rigidity or JVD.  Strap muscles easily seen       Lungs:   Diminished bases otherwise clear to auscultation bilaterally, respirations unlabored   Chest Wall:    No tenderness or deformity.  Ribs " easily outlined    Heart:    Regular rate and rhythm, S1 and S2 normal   Abdomen:     Soft, nontender, bowel sounds active all four quadrants,   Feeding tube, Mariscal with ambrosio urine and sediment in the tubing   Extremities: Poor muscle mass and tone throughout, no cyanosis or edema   Pulses:   2+ and symmetric all extremities           Neurologic: Unable to fully evaluate at this time      .    Data Review:  Labs in chart were reviewed.             Imaging Results (All)       Procedure Component Value Units Date/Time    CT Abdomen Pelvis Without Contrast [[id number]] Collected: 06/18/24 0406     Updated: 06/18/24 0406    Narrative:        Patient: JADE MARAVILLA  Time Out: 04:06  Exam(s): CT ABDOMEN + PELVIS Without Contrast     EXAM:    CT Abdomen and Pelvis Without Intravenous Contrast    CLINICAL HISTORY:     Reason for exam: abdominal pain, renal failure.    TECHNIQUE:    Axial computed tomography images of the abdomen and pelvis without   intravenous contrast.  CTDI is 15.69 mGy and DLP is 827.7 mGy-cm.  This   CT exam was performed according to the principle of ALARA (As Low As   Reasonably Achievable) by using one or more of the following dose   reduction techniques: automated exposure control, adjustment of the mA   and or kV according to patient size, and or use of iterative   reconstruction technique.    COMPARISON:    March 25, 2024 scattered fibrotic changes in the lung bases.    FINDINGS:    Lung bases:  Unremarkable.  No mass.  No consolidation.     ABDOMEN:    Liver:  Unremarkable.    Gallbladder and bile ducts:  Unremarkable.  No calcified stones.  No   ductal dilation.    Pancreas:  Unremarkable.  No ductal dilation.    Spleen:  Unremarkable.  No splenomegaly.    Adrenals:  Unremarkable.  No mass.    Kidneys and ureters:  Mild to moderate bilateral hydronephrosis and   hydroureter.  No ureterolithiasis is seen.  Likely urinary retention.  5.  3 cm simple cyst in the lower pole the right kidney.   No follow-up is   required.    Stomach and bowel:  Gaseous distention of the transverse colon.    Possible mild colonic ileus.  The sigmoid colon is partially compressed   by the enlarged urinary bladder.  No mucosal thickening.     PELVIS:    Appendix:  No findings to suggest acute appendicitis.    Bladder:  There is dilation of the urinary bladder measuring 20 cm   craniocaudad.  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    Reproductive:  Unremarkable as visualized.     ABDOMEN and PELVIS:    Intraperitoneal space:  Unremarkable.  No free air.  No significant   fluid collection.    Bones joints:  There is metallic artifact from a left hip arthroplasty.    No acute fracture or dislocation is seen.  Ankylosis of the spine.  No   acute fracture is seen.    Soft tissues:  Unremarkable.    Vasculature:  Unremarkable.  No abdominal aortic aneurysm.    Lymph nodes:  Unremarkable.  No enlarged lymph nodes.    Tubes, lines and devices:  There is a PEG tube in the stomach.  The   stomach is decompressed.    IMPRESSION:       1.  There is dilation of the urinary bladder measuring 20 cm craniocaudad.    There is irregular wall thickening with a 7 cm bladder diverticulum   near the vertex, similar to previous.  Consider chronic bladder outlet   obstruction and urinary retention.  2.  Mild to moderate bilateral hydronephrosis and hydroureter.  No   ureterolithiasis is seen.  Likely urinary retention.  3.  Gaseous distention of the transverse colon.  Possible mild colonic   ileus.  The sigmoid colon is partially compressed by the enlarged urinary   bladder.      Impression:          Electronically signed by Haider Kuhn MD on 06-18-24 at 0406    CT Head Without Contrast [[id number]] Collected: 06/18/24 0302     Updated: 06/18/24 0302    Narrative:        Patient: JADE MARAVILLA  Time Out: 03:01  Exam(s): CT HEAD Without Contrast     EXAM:    CT Head Without  Intravenous Contrast    CLINICAL HISTORY:     Reason for exam: AMS.    TECHNIQUE:    Axial computed tomography images of the head brain without intravenous   contrast.  CTDI is 55.7 mGy and DLP is 1018.9 mGy-cm.  This CT exam was   performed according to the principle of ALARA (As Low As Reasonably   Achievable) by using one or more of the following dose reduction   techniques: automated exposure control, adjustment of the mA and or kV   according to patient size, and or use of iterative reconstruction   technique.    COMPARISON:    3.29.24    FINDINGS:    Brain:  No hemorrhage, herniation, or mass effect.  Chronic   microvascular ischemic changes.    Ventricles:  No hydrocephalus.  Age related cerebral volume loss.    Bones joints:  Unremarkable.    Soft tissues:  Unremarkable.    Sinuses:  No air fluid levels.    Mastoid air cells:  Clear.    IMPRESSION:       No acute hemorrhage, hydrocephalus, or mass effect.      Impression:          Electronically signed by Manasa Reina MD on 06-18-24 at 0301    XR Chest 1 View [[id number]] Collected: 06/18/24 0231     Updated: 06/18/24 0231    Narrative:        Patient: JADE MARAVILLA  Time Out: 02:31  Exam(s): XR CXR 1 VIEW     EXAM:    XR Chest, 1 View    CLINICAL HISTORY:     Reason for exam: AMS.    TECHNIQUE:    Frontal view of the chest.    COMPARISON:  3 25 2024    FINDINGS:    See Impression.    IMPRESSION:    Prominent interstitial markings are seen.  Correlate with edema or   infection.  No effusion.  Unchanged heart size.    Impression:          Electronically signed by Manasa Reina MD on 06-18-24 at 0231              Assessment:  Active Hospital Problems    Diagnosis  POA    **UTI (urinary tract infection), bacterial [N39.0, A49.9]  Unknown    ARF (acute renal failure) [N17.9]  Unknown    Failure to thrive in adult [R62.7]  Yes    Dysphagia [R13.10]  Yes    Generalized weakness [R53.1]  Yes    DISH (disseminated idiopathic skeletal hyperostosis) [M48.10]   Yes    Immobility [Z74.09]  Yes    Generalized pain [R52]  Yes    Hypertension [I10]  Yes      Resolved Hospital Problems   No resolved problems to display.       Plan:    UTI treated and is ESBL based on past history and culture results   -Nitrofurantoin utilized prior to admission will be insufficient for this UTI   -Continue Invanz   -Hold methotrexate-BC pending x 2   -Urology consult pending from moderate bilateral hydronephrosis   -Procalcitonin likely skewed from renal disease      ARF with renal consult pending   -Mariscal in place -urine of normal color with sediment noted in tubing    -Continue IVF and await further renal recommendations   -Terazosin but no renal insulting meds noted per review of MAR provided   -Likely multifactorial -pre and post    Failure to thrive with chronic dysphagia reported generalized weakness as well as chronic immobility   -I am not exactly sure how to interpret current situation.  The patient is not conversational but also was not mute.  He Continus just sitting in bed grunting when I asked questions.  His overall body habitus is cachectic and very concerning but he already has a diagnosis of failure to thrive.  Long-term prognosis very guarded      Chronic generalized pain -hesitant to give narcotics based on current clinical situation    Dysphagia with feeding tube though apparently eats modified diet   -Continue modified diet have speech follow   -Colonic dilatation noted, monitor for now.  Allow diet and monitor for any GI aversion    SCDs for prophylaxis    On Prevacid send no additional GI prophylaxis warranted    Home MAR provided reviewed and reconciled        Further recommendations to follow as clinical course unfolds    Conor Balbuena MD  6/18/2024  11:12 EDT

## 2024-06-18 NOTE — PLAN OF CARE
Goal Outcome Evaluation:  Plan of Care Reviewed With: patient           Outcome Evaluation: Swallow eval completed. Pt given ice chips, thin (spoon/cup), nectar (cup/straw), and pureed trials. Swallow was mistimed/delayed with multiple swallows noted (2-4). Throat clearing noted inconsistently (delayed at times) across trials. Given pt's hx of silent aspiration, significant residuals, and current status, recommend NPO w/ use of PEG for nutrition and meds. ST to further assess via VFSS. Pt may have ice chips sparingly after oral care.      Anticipated Discharge Disposition (SLP): unknown, anticipate therapy at next level of care          SLP Swallowing Diagnosis: oral dysphagia, suspected pharyngeal dysphagia (06/18/24 7800)

## 2024-06-18 NOTE — DISCHARGE PLACEMENT REQUEST
"Anthony Maravilla (79 y.o. Male)       Date of Birth   1945    Social Security Number       Address   [address] [address]    Home Phone   [phone]    MRN   [id number]       [facility]    Marital Status                               Admission Date   6/17/24    Admission Type   Emergency    Admitting Provider   Conor Balbuena MD    Attending Provider   Conor Balbuena MD    Department, Room/Bed   [address], [address]/1       Discharge Date       Discharge Disposition       Discharge Destination                                 Attending Provider: Conor Balbuena MD    Allergies: No Known Allergies    Isolation: Contact   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), Candida Auris (rule out) (06/18/24)   Code Status: CPR    Ht: 190.5 cm (75\")   Wt: 63.1 kg (139 lb 1.6 oz)    Admission Cmt: None   Principal Problem: UTI (urinary tract infection), bacterial [N39.0,A49.9]                   Active Insurance as of 6/17/2024       Primary Coverage       Payor Plan Insurance Group Employer/Plan Group    AETNA MEDICARE REPLACEMENT AETNA MED ADV POS [id number]       Payor Plan Address Payor Plan Phone Number Payor Plan Fax Number Effective Dates    [address] [phone]  12/1/2023 - None Entered    [facility] [id number]         Subscriber Name Subscriber Birth Date Member ID       ANTHONY MARAVILLA 1945 [id number]                     Emergency Contacts        (Rel.) Home Phone Work Phone Mobile Phone    FABINATHALIE GLASGOW (Daughter) [phone] -- --    MAGY MARAVILLA [phone] -- --                "

## 2024-06-18 NOTE — ED NOTES
.Nursing report ED to floor  Anthony Gallgeos  79 y.o.  male    HPI :  HPI (Adult)  Stated Reason for Visit: pain all over, hx of chronic pain. possible abnormal speech.    Chief Complaint  Chief Complaint   Patient presents with    Pain       Admitting doctor:   Conor Balbuena MD    Admitting diagnosis:   The primary encounter diagnosis was Acute renal failure, unspecified acute renal failure type. Diagnoses of Chronic anemia, Hyponatremia, Urinary tract infection associated with indwelling urethral catheter, initial encounter, and Acute urinary retention were also pertinent to this visit.    Code status:   Current Code Status       Date Active Code Status Order ID Comments User Context       6/18/2024 0231 CPR (Attempt to Resuscitate) [id number]  Rosa Cormier APRN ED        Question Answer    Code Status (Patient has no pulse and is not breathing) CPR (Attempt to Resuscitate)    Medical Interventions (Patient has pulse or is breathing) Full Support                    Allergies:   Patient has no known allergies.    Isolation:   Contact    Intake and Output    Intake/Output Summary (Last 24 hours) at 6/18/2024 0948  Last data filed at 6/18/2024 0723  Gross per 24 hour   Intake 600 ml   Output 1500 ml   Net -900 ml       Weight:       06/17/24  2351   Weight: 63.1 kg (139 lb 1.6 oz)       Most recent vitals:   Vitals:    06/18/24 0401 06/18/24 0601 06/18/24 0701 06/18/24 0821   BP: 129/58 122/61 118/57 124/58   Pulse: 98 97 98 98   Resp:       Temp:       TempSrc:       SpO2: 99% 99% 100% 100%   Weight:       Height:           Active LDAs/IV Access:   Lines, Drains & Airways       Active LDAs       Name Placement date Placement time Site Days    Peripheral IV 06/18/24 0712 Anterior;Left Forearm 06/18/24  0712  Forearm  less than 1    Gastrostomy/Enterostomy Percutaneous endoscopic gastrostomy (PEG) 20 Fr. LUQ 03/29/24  0715  LUQ  81    Urethral Catheter 16 Fr. 06/18/24  0247  -- less than 1               "      Labs (abnormal labs have a star):   Labs Reviewed   COMPREHENSIVE METABOLIC PANEL - Abnormal; Notable for the following components:       Result Value    Glucose 111 (*)      (*)     Creatinine 3.75 (*)     Sodium 130 (*)     Potassium 5.7 (*)     Chloride 96 (*)     CO2 20.5 (*)     Albumin 2.9 (*)     BUN/Creatinine Ratio 27.5 (*)     eGFR 15.6 (*)     All other components within normal limits    Narrative:     GFR Normal >60  Chronic Kidney Disease <60  Kidney Failure <15    The GFR formula is only valid for adults with stable renal function between ages 18 and 70.   URINALYSIS W/ MICROSCOPIC IF INDICATED (NO CULTURE) - Abnormal; Notable for the following components:    Color, UA Dark Yellow (*)     Appearance, UA Turbid (*)     Ketones, UA Trace (*)     Blood, UA Large (3+) (*)     Protein,  mg/dL (2+) (*)     Leuk Esterase, UA Large (3+) (*)     All other components within normal limits   PROCALCITONIN - Abnormal; Notable for the following components:    Procalcitonin 21.30 (*)     All other components within normal limits    Narrative:     As a Marker for Sepsis (Non-Neonates):    1. <0.5 ng/mL represents a low risk of severe sepsis and/or septic shock.  2. >2 ng/mL represents a high risk of severe sepsis and/or septic shock.    As a Marker for Lower Respiratory Tract Infections that require antibiotic therapy:    PCT on Admission    Antibiotic Therapy       6-12 Hrs later    >0.5                Strongly Recommended  >0.25 - <0.5        Recommended   0.1 - 0.25          Discouraged              Remeasure/reassess PCT  <0.1                Strongly Discouraged     Remeasure/reassess PCT    As 28 day mortality risk marker: \"Change in Procalcitonin Result\" (>80% or <=80%) if Day 0 (or Day 1) and Day 4 values are available. Refer to http://www.[employer]-pct-calculator.com    Change in PCT <=80%  A decrease of PCT levels below or equal to 80% defines a positive change in PCT test result representing " a higher risk for 28-day all-cause mortality of patients diagnosed with severe sepsis for septic shock.    Change in PCT >80%  A decrease of PCT levels of more than 80% defines a negative change in PCT result representing a lower risk for 28-day all-cause mortality of patients diagnosed with severe sepsis or septic shock.      MAGNESIUM - Abnormal; Notable for the following components:    Magnesium 2.5 (*)     All other components within normal limits   CBC WITH AUTO DIFFERENTIAL - Abnormal; Notable for the following components:    RBC 3.86 (*)     Hemoglobin 10.5 (*)     Hematocrit 31.7 (*)     RDW 15.7 (*)     All other components within normal limits   MANUAL DIFFERENTIAL - Abnormal; Notable for the following components:    Lymphocyte % 14.4 (*)     All other components within normal limits   URINALYSIS, MICROSCOPIC ONLY - Abnormal; Notable for the following components:    RBC, UA 6-10 (*)     WBC, UA Too Numerous to Count (*)     Bacteria, UA 4+ (*)     Squamous Epithelial Cells, UA 3-6 (*)     All other components within normal limits   BASIC METABOLIC PANEL - Abnormal; Notable for the following components:    BUN 89 (*)     Creatinine 3.57 (*)     Sodium 134 (*)     CO2 20.5 (*)     Calcium 8.5 (*)     eGFR 16.5 (*)     All other components within normal limits    Narrative:     GFR Normal >60  Chronic Kidney Disease <60  Kidney Failure <15    The GFR formula is only valid for adults with stable renal function between ages 18 and 70.   CBC (NO DIFF) - Abnormal; Notable for the following components:    RBC 3.82 (*)     Hemoglobin 10.4 (*)     Hematocrit 31.2 (*)     All other components within normal limits   URIC ACID - Abnormal; Notable for the following components:    Uric Acid 9.1 (*)     All other components within normal limits   LACTIC ACID, PLASMA - Normal   BLOOD CULTURE   BLOOD CULTURE   CANDIDA AURIS SCREEN   URINE CULTURE   SODIUM, URINE, RANDOM   CHLORIDE, URINE, RANDOM   CBC AND DIFFERENTIAL     Narrative:     The following orders were created for panel order CBC & Differential.  Procedure                               Abnormality         Status                     ---------                               -----------         ------                     CBC Auto Differential[[id number]]        Abnormal            Final result                 Please view results for these tests on the individual orders.       EKG:   No orders to display       Meds given in ED:   Medications   sodium chloride 0.9 % flush 10 mL (10 mL Intravenous Given 6/18/24 0330)   sodium chloride 0.9 % flush 10 mL (has no administration in time range)   sodium chloride 0.9 % infusion 40 mL (has no administration in time range)   sodium chloride 0.9 % infusion (100 mL/hr Intravenous New Bag 6/18/24 0844)   acetaminophen (TYLENOL) tablet 650 mg (has no administration in time range)     Or   acetaminophen (TYLENOL) 160 MG/5ML oral solution 650 mg (has no administration in time range)     Or   acetaminophen (TYLENOL) suppository 650 mg (has no administration in time range)   sennosides-docusate (PERICOLACE) 8.6-50 MG per tablet 2 tablet (has no administration in time range)     And   polyethylene glycol (MIRALAX) packet 17 g (has no administration in time range)     And   bisacodyl (DULCOLAX) EC tablet 5 mg (has no administration in time range)     And   bisacodyl (DULCOLAX) suppository 10 mg (has no administration in time range)   ondansetron ODT (ZOFRAN-ODT) disintegrating tablet 4 mg (has no administration in time range)     Or   ondansetron (ZOFRAN) injection 4 mg (has no administration in time range)   calcium carbonate (TUMS) chewable tablet 500 mg (200 mg elemental) (has no administration in time range)   ertapenem (INVanz) 1,000 mg in sodium chloride 0.9 % 100 mL MBP (has no administration in time range)   HYDROcodone-acetaminophen (NORCO) 5-325 MG per tablet 1 tablet (has no administration in time range)   sodium chloride 0.9 % bolus 500  mL (0 mL Intravenous Stopped 6/18/24 0322)   cefTRIAXone (ROCEPHIN) 1,000 mg in sodium chloride 0.9 % 100 mL MBP (0 mg Intravenous Stopped 6/18/24 0247)   ertapenem (INVanz) 1,000 mg in sodium chloride 0.9 % 100 mL MBP (0 mg Intravenous Stopped 6/18/24 0322)       Imaging results:  CT Abdomen Pelvis Without Contrast    Result Date: 6/18/2024  Electronically signed by Haider Kuhn MD on 06-18-24 at 0406    CT Head Without Contrast    Result Date: 6/18/2024  Electronically signed by Manasa Reina MD on 06-18-24 at 0301    XR Chest 1 View    Result Date: 6/18/2024  Electronically signed by Manasa Reina MD on 06-18-24 at 0231     Ambulatory status:   -     Social issues:   Social History     Socioeconomic History    Marital status:     Number of children: 2   Tobacco Use    Smoking status: Never    Smokeless tobacco: Never   Vaping Use    Vaping status: Never Used   Substance and Sexual Activity    Alcohol use: No    Drug use: No    Sexual activity: Defer       Peripheral Neurovascular  Peripheral Neurovascular (Adult)  Peripheral Neurovascular WDL: WDL    Neuro Cognitive  Neuro Cognitive (Adult)  Cognitive/Neuro/Behavioral WDL: .WDL except, level of consciousness  Level of Consciousness: Responds to Voice  Orientation: disoriented to, place, time, situation  Speech: garbled  [place] Coma Scale  Best Eye Response: 3-->(E3) to speech  Best Motor Response: 5-->(M5) localizes pain  Best Verbal Response: 2-->(V2) incomprehensible speech  Judith Coma Scale Score: 10    Learning  Learning Assessment (Adult)  Learning Readiness and Ability: cognitive limitation noted  Education Provided  Person Taught: patient, family member/friend    Respiratory  Respiratory WDL  Respiratory WDL: WDL    Abdominal Pain  Safety Interventions  Safety Precautions/Falls Reduction: family at bedside    Pain Assessments  Pain (Adult)  Preferred Pain Scale: FACES (Mckeon-Baker FACES Pain Rating Scale)  Patient requested Medication  Prescribed for Lower Pain Scale: No  FACES Pain Rating: Rest: 0-->no hurt  FACES Pain Rating: Activity: 0-->no hurt  Pain Location: abdomen, knee (right knee is swollen snd tender to touch)  Nonverbal Indicators of Pain: nonverbal indicators absent           Lisa Jean Baptiste RN  06/18/24 09:48 EDT

## 2024-06-18 NOTE — THERAPY EVALUATION
Acute Care - Speech Language Pathology   Swallow Initial Evaluation [facility]     Patient Name: Anthony Gallegos  : 1945  MRN: [id number]  Today's Date: 2024               Admit Date: 2024    Visit Dx:     ICD-10-CM ICD-9-CM   1. Acute renal failure, unspecified acute renal failure type  N17.9 584.9   2. Chronic anemia  D64.9 285.9   3. Hyponatremia  E87.1 276.1   4. Urinary tract infection associated with indwelling urethral catheter, initial encounter  T83.511A 996.64    N39.0 599.0   5. Acute urinary retention  R33.8 788.29     Patient Active Problem List   Diagnosis    Ankylosing spondylitis of cervical region    Recent unexplained weight loss    Abnormal serum lipase level    Abnormal serum level of amylase    Hypertension    Boil    Immobility    Fall from bed    Tetrahydrocannabinol (THC) use disorder, mild, abuse    Generalized pain        Grief    DISH (disseminated idiopathic skeletal hyperostosis)    Generalized weakness    Severe malnutrition    Altered mental status    Transient alteration of awareness    GERD without esophagitis    BPH (benign prostatic hyperplasia)    UTI (urinary tract infection)    Hyperglycemia    Decreased oral intake    Dysphagia    Failure to thrive in adult    Immunosuppression due to drug therapy    Renal failure    UTI (urinary tract infection), bacterial    ARF (acute renal failure)     Past Medical History:   Diagnosis Date    Abscess of scrotum     MARTITA (acute kidney injury)     Altered mental state     Arthritis     AS (ankylosing spondylitis)     History of MRSA infection     Hypertension     Leukocytosis     Tetrahydrocannabinol (THC) use disorder, mild, abuse 2023    Uveitis      Past Surgical History:   Procedure Laterality Date    COLONOSCOPY      ENDOSCOPY W/ PEG TUBE PLACEMENT N/A 3/29/2024    Procedure: ESOPHAGOGASTRODUODENOSCOPY WITH PERCUTANEOUS ENDOSCOPIC GASTROSTOMY TUBE INSERTION;  Surgeon: Khadar Broderick MD;   Location:  [facility];  Service: General;  Laterality: N/A;  PRE/POST - DYSPHAGIA    ROTATOR CUFF REPAIR Right     TOTAL HIP ARTHROPLASTY Left 2014       SLP Recommendation and Plan  SLP Swallowing Diagnosis: oral dysphagia, suspected pharyngeal dysphagia (06/18/24 1130)  SLP Diet Recommendation: NPO, ice chips between meals after oral care, with supervision (06/18/24 1130)     SLP Rec. for Method of Medication Administration: meds via alternate route (06/18/24 1130)     Monitor for Signs of Aspiration: yes, notify SLP if any concerns (06/18/24 1130)  Recommended Diagnostics: VFSS ([place]) (06/18/24 1130)  Swallow Criteria for Skilled Therapeutic Interventions Met: demonstrates skilled criteria (06/18/24 1130)  Anticipated Discharge Disposition (SLP): unknown, anticipate therapy at next level of care (06/18/24 1130)  Rehab Potential/Prognosis, Swallowing: adequate, monitor progress closely (06/18/24 1130)  Therapy Frequency (Swallow): PRN (06/18/24 1130)  Predicted Duration Therapy Intervention (Days): until discharge (06/18/24 1130)  Oral Care Recommendations: Oral Care BID/PRN (06/18/24 1130)                                        Plan of Care Reviewed With: patient  Outcome Evaluation: Swallow eval completed. Pt given ice chips, thin (spoon/cup), nectar (cup/straw), and pureed trials. Swallow was mistimed/delayed with multiple swallows noted (2-4). Throat clearing noted inconsistently (delayed at times) across trials. Given pt's hx of silent aspiration, significant residuals, and current status, recommend NPO w/ use of PEG for nutrition and meds. ST to further assess via VFSS. Pt may have ice chips sparingly after oral care.      SWALLOW EVALUATION (Last 72 Hours)       SLP Adult Swallow Evaluation       Row Name 06/18/24 1130                   Rehab Evaluation    Document Type evaluation  -AW        Subjective Information no complaints  -AW        Patient Observations alert;cooperative;agree to therapy  -AW         Patient/Family/Caregiver Comments/Observations Pt followed directions for eval; difficult to obtain history from pt; no family present.  -AW        Patient Effort good  -AW        Symptoms Noted During/After Treatment none  -AW           General Information    Patient Profile Reviewed yes  -AW        Pertinent History Of Current Problem Pt admitted with AMS, UTI, MARTITA, hyponatremia; h/o dysphagia w/ PEG placed 3/29, failure to thrive. Pt was on mech soft and thin at [facility] and was working with ST.  -AW        Current Method of Nutrition NPO  -AW        Precautions/Limitations, Vision WFL;for purposes of eval  -AW        Precautions/Limitations, Hearing WFL;for purposes of eval  -AW        Prior Level of Function-Communication motor speech impairment;cognitive-linguistic impairment  -AW        Prior Level of Function-Swallowing soft to chew;thin liquids;other (see comments)  at SNF; pt working with ST; VFSS 3/28/24 recommended NPO  -AW        Plans/Goals Discussed with patient;agreed upon  -AW        Barriers to Rehab previous functional deficit;cognitive status  -AW        Patient's Goals for Discharge patient did not state  -AW           Pain    Additional Documentation Pain Scale: Numbers Pre/Post-Treatment (Group)  -AW           Pain Scale: Numbers Pre/Post-Treatment    Pretreatment Pain Rating 0/10 - no pain  -AW        Posttreatment Pain Rating 0/10 - no pain  -AW           Oral Motor Structure and Function    Dentition Assessment edentulous, dentures not available  -AW        Secretion Management WNL/WFL  -AW        Mucosal Quality moist, healthy  -AW           Oral Musculature and Cranial Nerve Assessment    Oral Motor General Assessment generalized oral motor weakness  -AW           General Eating/Swallowing Observations    Respiratory Support Currently in Use room air  -AW        Eating/Swallowing Skills fed by SLP;self-fed  -AW        Positioning During Eating upright in bed  -AW        Utensils Used  spoon;cup;straw  -AW        Consistencies Trialed ice chips;thin liquids;nectar/syrup-thick liquids;pureed  -AW           Clinical Swallow Eval    Clinical Swallow Evaluation Summary Swallow eval completed. Pt given ice chips, thin (spoon/cup), nectar (cup/straw), and pureed trials. Swallow was mistimed/delayed with multiple swallows noted (2-4). Throat clearing noted inconsistently (delayed at times) across trials. Given pt's hx of silent aspiration, significant residuals, and current status, recommend NPO w/ use of PEG for nutrition and meds. ST to further assess via VFSS. Pt may have ice chips sparingly after oral care.  -AW           SLP Evaluation Clinical Impression    SLP Swallowing Diagnosis oral dysphagia;suspected pharyngeal dysphagia  -AW        Functional Impact risk of aspiration/pneumonia;risk of malnutrition;risk of dehydration  -AW        Rehab Potential/Prognosis, Swallowing adequate, monitor progress closely  -AW        Swallow Criteria for Skilled Therapeutic Interventions Met demonstrates skilled criteria  -AW           Recommendations    Therapy Frequency (Swallow) PRN  -AW        Predicted Duration Therapy Intervention (Days) until discharge  -AW        SLP Diet Recommendation NPO;ice chips between meals after oral care, with supervision  -AW        Recommended Diagnostics VFSS ([facility])  -AW        Oral Care Recommendations Oral Care BID/PRN  -AW        SLP Rec. for Method of Medication Administration meds via alternate route  -AW        Monitor for Signs of Aspiration yes;notify SLP if any concerns  -AW        Anticipated Discharge Disposition (SLP) unknown;anticipate therapy at next level of care  -AW                  User Key  (r) = Recorded By, (t) = Taken By, (c) = Cosigned By      Initials Name Effective Dates    Ahstyn Doty, SLP 08/28/23 -                     EDUCATION  The patient has been educated in the following areas:   Dysphagia (Swallowing Impairment) Oral Care/Hydration NPO  rationale.                Time Calculation:    Time Calculation- SLP       Row Name 06/18/24 1628             Time Calculation- SLP    SLP Start Time 1130  -AW      SLP Received On 06/18/24  -                User Key  (r) = Recorded By, (t) = Taken By, (c) = Cosigned By      Initials Name Provider Type    Ashtyn Doty SLP Speech and Language Pathologist                    Therapy Charges for Today       Code Description Service Date Service Provider Modifiers Qty    [id number] HC ST EVAL ORAL PHARYNG SWALLOW 4 6/18/2024 Ashtyn Johnson SLP GN 1                 BRANDON Navarrete  6/18/2024

## 2024-06-19 ENCOUNTER — APPOINTMENT (OUTPATIENT)
Dept: GENERAL RADIOLOGY | Facility: HOSPITAL | Age: 79
End: 2024-06-19
Payer: MEDICARE

## 2024-06-19 LAB
ALBUMIN SERPL-MCNC: 2.4 G/DL (ref 3.5–5.2)
ANION GAP SERPL CALCULATED.3IONS-SCNC: 11.7 MMOL/L (ref 5–15)
BACTERIA SPEC AEROBE CULT: ABNORMAL
BUN SERPL-MCNC: 80 MG/DL (ref 8–23)
BUN/CREAT SERPL: 49.1 (ref 7–25)
CALCIUM SPEC-SCNC: 8.5 MG/DL (ref 8.6–10.5)
CHLORIDE SERPL-SCNC: 110 MMOL/L (ref 98–107)
CO2 SERPL-SCNC: 20.3 MMOL/L (ref 22–29)
CREAT SERPL-MCNC: 1.63 MG/DL (ref 0.76–1.27)
DEPRECATED RDW RBC AUTO: 42.7 FL (ref 37–54)
EGFRCR SERPLBLD CKD-EPI 2021: 42.3 ML/MIN/1.73
ERYTHROCYTE [DISTWIDTH] IN BLOOD BY AUTOMATED COUNT: 14.7 % (ref 12.3–15.4)
GLUCOSE SERPL-MCNC: 76 MG/DL (ref 65–99)
GRAM STN SPEC: ABNORMAL
HCT VFR BLD AUTO: 26.9 % (ref 37.5–51)
HGB BLD-MCNC: 9 G/DL (ref 13–17.7)
ISOLATED FROM: ABNORMAL
MAGNESIUM SERPL-MCNC: 2.4 MG/DL (ref 1.6–2.4)
MCH RBC QN AUTO: 27 PG (ref 26.6–33)
MCHC RBC AUTO-ENTMCNC: 33.5 G/DL (ref 31.5–35.7)
MCV RBC AUTO: 80.8 FL (ref 79–97)
PHOSPHATE SERPL-MCNC: 3.3 MG/DL (ref 2.5–4.5)
PLATELET # BLD AUTO: 264 10*3/MM3 (ref 140–450)
PMV BLD AUTO: 8.3 FL (ref 6–12)
POTASSIUM SERPL-SCNC: 3.8 MMOL/L (ref 3.5–5.2)
RBC # BLD AUTO: 3.33 10*6/MM3 (ref 4.14–5.8)
SODIUM SERPL-SCNC: 142 MMOL/L (ref 136–145)
URATE SERPL-MCNC: 9.4 MG/DL (ref 3.4–7)
WBC NRBC COR # BLD AUTO: 7.93 10*3/MM3 (ref 3.4–10.8)

## 2024-06-19 PROCEDURE — 74230 X-RAY XM SWLNG FUNCJ C+: CPT

## 2024-06-19 PROCEDURE — 83735 ASSAY OF MAGNESIUM: CPT

## 2024-06-19 PROCEDURE — 87040 BLOOD CULTURE FOR BACTERIA: CPT | Performed by: HOSPITALIST

## 2024-06-19 PROCEDURE — 85027 COMPLETE CBC AUTOMATED: CPT | Performed by: HOSPITALIST

## 2024-06-19 PROCEDURE — 80069 RENAL FUNCTION PANEL: CPT

## 2024-06-19 PROCEDURE — 97161 PT EVAL LOW COMPLEX 20 MIN: CPT

## 2024-06-19 PROCEDURE — 92611 MOTION FLUOROSCOPY/SWALLOW: CPT

## 2024-06-19 PROCEDURE — 84550 ASSAY OF BLOOD/URIC ACID: CPT

## 2024-06-19 PROCEDURE — 25810000003 SODIUM CHLORIDE 0.9 % SOLUTION: Performed by: NURSE PRACTITIONER

## 2024-06-19 PROCEDURE — 97165 OT EVAL LOW COMPLEX 30 MIN: CPT

## 2024-06-19 PROCEDURE — 36415 COLL VENOUS BLD VENIPUNCTURE: CPT

## 2024-06-19 PROCEDURE — 25010000002 ERTAPENEM PER 500 MG: Performed by: HOSPITALIST

## 2024-06-19 RX ADMIN — BARIUM SULFATE 50 ML: 400 SUSPENSION ORAL at 11:40

## 2024-06-19 RX ADMIN — BARIUM SULFATE 55 ML: 0.81 POWDER, FOR SUSPENSION ORAL at 11:40

## 2024-06-19 RX ADMIN — ERTAPENEM SODIUM 500 MG: 1 INJECTION, POWDER, LYOPHILIZED, FOR SOLUTION INTRAMUSCULAR; INTRAVENOUS at 12:43

## 2024-06-19 RX ADMIN — LANSOPRAZOLE 30 MG: 15 TABLET, ORALLY DISINTEGRATING ORAL at 05:00

## 2024-06-19 RX ADMIN — SODIUM CHLORIDE 100 ML/HR: 9 INJECTION, SOLUTION INTRAVENOUS at 04:59

## 2024-06-19 RX ADMIN — Medication 10 ML: at 20:16

## 2024-06-19 RX ADMIN — BARIUM SULFATE 4 ML: 980 POWDER, FOR SUSPENSION ORAL at 11:40

## 2024-06-19 NOTE — PLAN OF CARE
Goal Outcome Evaluation:  Plan of Care Reviewed With: (P) patient           Outcome Evaluation: (P) VFSS completed. Pt demonstrates moderate oropharyngeal dysphagia. Recommend puree and HTL. Meds crushed with puree. Small bites/sips, alternate liquid wash with bites, upright positioning, small bites/sips, no straw.      Anticipated Discharge Disposition (SLP): (P) anticipate therapy at next level of care          SLP Swallowing Diagnosis: (P) moderate, oral dysphagia, pharyngeal dysphagia (06/19/24 1300)

## 2024-06-19 NOTE — PAYOR COMM NOTE
"Anthony Gallegos (79 y.o. Male)    REQUEST FOR IP AUTH    REF# [id number]    CATHIE DONALD   PH: [phone]  FAX: [phone]   Date of Birth   1945    Social Security Number       Address   [address] [id number]    Home Phone   [phone]    MRN   [id number]       Samaritan   Sikh    Marital Status                               Admission Date   6/18/24    Admission Type   Emergency    Admitting Provider   Conor Balbuena MD    Attending Provider   Conor Balbuena MD    Department, Room/Bed   [facility], S615/1       Discharge Date       Discharge Disposition       Discharge Destination                                 Attending Provider: Conor Balbuena MD    Allergies: No Known Allergies    Isolation: Contact   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), Candida Auris (rule out) (06/18/24), ESBL (06/18/24)   Code Status: CPR    Ht: 190.5 cm (75\")   Wt: 63.1 kg (139 lb 1.6 oz)    Admission Cmt: None   Principal Problem: UTI (urinary tract infection), bacterial [N39.0,A49.9]                   Active Insurance as of 6/17/2024       Primary Coverage       Payor Plan Insurance Group Employer/Plan Group    AETNA MEDICARE REPLACEMENT AETNA MED ADV POS [id number]       Payor Plan Address Payor Plan Phone Number Payor Plan Fax Number Effective Dates    [address] [phone]  12/1/2023 - None Entered    [facility] [id number]         Subscriber Name Subscriber Birth Date Member ID       ANTHONY GALLEGOS 1945 [id number]                     Emergency Contacts        (Rel.) Home Phone Work Phone Mobile Phone    NATHALIE DON (Daughter) [phone] -- --    MAGY GALLEGOS [phone] -- --              [place]: [facility] [id number]  Tax ID [id number]     History & Physical        Conor Balbuena MD at 06/18/24 1110          HISTORY AND PHYSICAL   [facility]        Date of Admission: " 2024  Patient Identification:  Name: Anthony Gallegos  Age: 79 y.o.  Sex: male  :  1945  MRN: [id number]                     Primary Care Physician: Keshav Eason MD    Chief Complaint: Brought here for complaints of acute pain    History of Present Illness:   Mr. Gallegos is an 79-year-old gentleman who is a facility resident was brought here by EMS due to complaints of chronic pain and hurting all over.  I am not exactly sure how this was conveyed is currently he is not really verbal at all.  When I ask him questions he grunts at me.  He is certainly not conversational and his overall body habitus is very concerning his appears nearly [place] with a cachectic style appearance.  Mariscal catheter was placed as he was found to be in acute renal failure and patient was administered Rocephin then Invanz as well as a 500 cc bolus in the ER.  I am not able to get any further history out of this gentleman and otherwise relied on chart review.  He does have a G-tube but apparently takes a modified diet utilizing tube for medications at times.  Per review of the chart there is a son that is involved but no one is currently at bedside when I evaluated the patient in ER room 35.  Patient was apparently already on nitrofurantoin for UTI prior to admission.    Past Medical History:  Past Medical History:   Diagnosis Date    Abscess of scrotum     MARTITA (acute kidney injury)     Altered mental state     Arthritis     AS (ankylosing spondylitis)     History of MRSA infection     Hypertension     Leukocytosis     Tetrahydrocannabinol (THC) use disorder, mild, abuse 2023    Uveitis      Past Surgical History:  Past Surgical History:   Procedure Laterality Date    COLONOSCOPY      ENDOSCOPY W/ PEG TUBE PLACEMENT N/A 3/29/2024    Procedure: ESOPHAGOGASTRODUODENOSCOPY WITH PERCUTANEOUS ENDOSCOPIC GASTROSTOMY TUBE INSERTION;  Surgeon: Khadar Broderick MD;  Location: [facility] ENDOSCOPY;  Service: General;   Laterality: N/A;  PRE/POST - DYSPHAGIA    ROTATOR CUFF REPAIR Right     TOTAL HIP ARTHROPLASTY Left 2014      Home Meds:  Medications Prior to Admission   Medication Sig Dispense Refill Last Dose    lansoprazole (PREVACID SOLUTAB) 30 MG Tablet Delayed Release Dispersible disintegrating tablet Administer 1 tablet per G tube Every Morning.   6/17/2024    melatonin 3 MG tablet Take 1 tablet by mouth At Night As Needed for Sleep.   6/17/2024    methotrexate 2.5 MG tablet Take 1 tablet by mouth 1 (One) Time Per Week. Take 2.5 mg on Wednesday and 2.5 mg on Thursday.  Do not take any medication on Friday through Tuesday.   Past Week    nitrofurantoin, macrocrystal-monohydrate, (MACROBID) 100 MG capsule Take 1 capsule by mouth 2 (Two) Times a Day.   6/17/2024    Omega-3 Fatty Acids (OMEGA 3 PO) Take 1 capsule by mouth Daily.   6/17/2024    polyethylene glycol 17 g packet 1 packet daily per tube   6/17/2024    potassium chloride (KAYCIEL) 20 mEq/15 mL solution Take 15 mL by mouth Daily.   6/17/2024    sennosides-docusate (PERICOLACE) 8.6-50 MG per tablet Administer 2 tablets per G tube Daily.   6/17/2024    terazosin (HYTRIN) 1 MG capsule Administer 1 capsule per G tube Every Night.   6/17/2024    acetaminophen (TYLENOL) 325 MG tablet Take 2 tablets by mouth Every 4 (Four) Hours As Needed for Mild Pain.       bisacodyl (DULCOLAX) 5 MG EC tablet Take 1 tablet by mouth Daily As Needed for Constipation (Use if polyethylene glycol is ineffective).       Menthol-Zinc Oxide 0.44-20.6 % ointment Apply 1 Application topically to the appropriate area as directed Every 12 (Twelve) Hours.          Allergies:  No Known Allergies  Immunizations:  Immunization History   Administered Date(s) Administered    COVID-19 (MODERNA) BIVALENT 12+YRS 09/27/2022    COVID-19 (MODERNA) Monovalent Original Booster 12/02/2021    COVID-19 (PFIZER) Purple Cap Monovalent 04/29/2021, 05/21/2021    Fluad Quad 65+ 10/07/2023    Pneumococcal Conjugate  "13-Valent (PCV13) 2019    Pneumococcal Polysaccharide (PPSV23) 2020    Shingrix 2018    Tdap 2009, 2015, 2020     Social History:   Social History     Social History Narrative    Not on file     Social History     Socioeconomic History    Marital status:     Number of children: 2   Tobacco Use    Smoking status: Never    Smokeless tobacco: Never   Vaping Use    Vaping status: Never Used   Substance and Sexual Activity    Alcohol use: No    Drug use: No    Sexual activity: Defer       Family History:  Family History   Problem Relation Age of Onset    Alzheimer's disease Mother         Review of Systems  See history of present illness and past medical history.  Completely unobtainable for me at this time.  Remainder of ROS is negative.    Objective:  T Max 24 hrs: Temp (24hrs), Av.6 °F (37 °C), Min:98.6 °F (37 °C), Max:98.6 °F (37 °C)    Vitals Ranges:   Temp:  [98.6 °F (37 °C)] 98.6 °F (37 °C)  Heart Rate:  [] 99  Resp:  [22] 22  BP: (104-129)/(57-80) 104/61      Exam:  /61   Pulse 99   Temp 98.6 °F (37 °C) (Oral)   Resp 22   Ht 190.5 cm (75\")   Wt 63.1 kg (139 lb 1.6 oz)   SpO2 98%   BMI 17.39 kg/m²     General Appearance:    Alert, nonthriving cachectic patient, grunts with answers, no family present, chronically ill-appearing a difficult to tell what is acute and what is chronic   Head:    Normocephalic, bitemporal wasting, atraumatic   Eyes:  Wearing some thick dark shades   Ears:    Normal external ear canals, both ears   Nose:   Nares normal, septum midline, mucosa normal, no drainage    or sinus tenderness   Throat: Dry mucous membranes, cheeks sunken   Neck:   Supple, no meningismus or rigidity or JVD.  Strap muscles easily seen       Lungs:   Diminished bases otherwise clear to auscultation bilaterally, respirations unlabored   Chest Wall:    No tenderness or deformity.  Ribs easily outlined    Heart:    Regular rate and rhythm, S1 and S2 " normal   Abdomen:     Soft, nontender, bowel sounds active all four quadrants,   Feeding tube, Mariscal with ambrosio urine and sediment in the tubing   Extremities: Poor muscle mass and tone throughout, no cyanosis or edema   Pulses:   2+ and symmetric all extremities           Neurologic: Unable to fully evaluate at this time      .    Data Review:  Labs in chart were reviewed.             Imaging Results (All)       Procedure Component Value Units Date/Time    CT Abdomen Pelvis Without Contrast [[id number]] Collected: 06/18/24 0406     Updated: 06/18/24 0406    Narrative:        Patient: JADE MARAVILLA  Time Out: 04:06  Exam(s): CT ABDOMEN + PELVIS Without Contrast     EXAM:    CT Abdomen and Pelvis Without Intravenous Contrast    CLINICAL HISTORY:     Reason for exam: abdominal pain, renal failure.    TECHNIQUE:    Axial computed tomography images of the abdomen and pelvis without   intravenous contrast.  CTDI is 15.69 mGy and DLP is 827.7 mGy-cm.  This   CT exam was performed according to the principle of ALARA (As Low As   Reasonably Achievable) by using one or more of the following dose   reduction techniques: automated exposure control, adjustment of the mA   and or kV according to patient size, and or use of iterative   reconstruction technique.    COMPARISON:    March 25, 2024 scattered fibrotic changes in the lung bases.    FINDINGS:    Lung bases:  Unremarkable.  No mass.  No consolidation.     ABDOMEN:    Liver:  Unremarkable.    Gallbladder and bile ducts:  Unremarkable.  No calcified stones.  No   ductal dilation.    Pancreas:  Unremarkable.  No ductal dilation.    Spleen:  Unremarkable.  No splenomegaly.    Adrenals:  Unremarkable.  No mass.    Kidneys and ureters:  Mild to moderate bilateral hydronephrosis and   hydroureter.  No ureterolithiasis is seen.  Likely urinary retention.  5.  3 cm simple cyst in the lower pole the right kidney.  No follow-up is   required.    Stomach and bowel:  Gaseous  distention of the transverse colon.    Possible mild colonic ileus.  The sigmoid colon is partially compressed   by the enlarged urinary bladder.  No mucosal thickening.     PELVIS:    Appendix:  No findings to suggest acute appendicitis.    Bladder:  There is dilation of the urinary bladder measuring 20 cm   craniocaudad.  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    Reproductive:  Unremarkable as visualized.     ABDOMEN and PELVIS:    Intraperitoneal space:  Unremarkable.  No free air.  No significant   fluid collection.    Bones joints:  There is metallic artifact from a left hip arthroplasty.    No acute fracture or dislocation is seen.  Ankylosis of the spine.  No   acute fracture is seen.    Soft tissues:  Unremarkable.    Vasculature:  Unremarkable.  No abdominal aortic aneurysm.    Lymph nodes:  Unremarkable.  No enlarged lymph nodes.    Tubes, lines and devices:  There is a PEG tube in the stomach.  The   stomach is decompressed.    IMPRESSION:       1.  There is dilation of the urinary bladder measuring 20 cm craniocaudad.    There is irregular wall thickening with a 7 cm bladder diverticulum   near the vertex, similar to previous.  Consider chronic bladder outlet   obstruction and urinary retention.  2.  Mild to moderate bilateral hydronephrosis and hydroureter.  No   ureterolithiasis is seen.  Likely urinary retention.  3.  Gaseous distention of the transverse colon.  Possible mild colonic   ileus.  The sigmoid colon is partially compressed by the enlarged urinary   bladder.      Impression:          Electronically signed by Haider Kuhn MD on 06-18-24 at 0406    CT Head Without Contrast [[id number]] Collected: 06/18/24 0302     Updated: 06/18/24 0302    Narrative:        Patient: JADE MARAVILLA  Time Out: 03:01  Exam(s): CT HEAD Without Contrast     EXAM:    CT Head Without Intravenous Contrast    CLINICAL HISTORY:     Reason for exam:  AMS.    TECHNIQUE:    Axial computed tomography images of the head brain without intravenous   contrast.  CTDI is 55.7 mGy and DLP is 1018.9 mGy-cm.  This CT exam was   performed according to the principle of ALARA (As Low As Reasonably   Achievable) by using one or more of the following dose reduction   techniques: automated exposure control, adjustment of the mA and or kV   according to patient size, and or use of iterative reconstruction   technique.    COMPARISON:    3.29.24    FINDINGS:    Brain:  No hemorrhage, herniation, or mass effect.  Chronic   microvascular ischemic changes.    Ventricles:  No hydrocephalus.  Age related cerebral volume loss.    Bones joints:  Unremarkable.    Soft tissues:  Unremarkable.    Sinuses:  No air fluid levels.    Mastoid air cells:  Clear.    IMPRESSION:       No acute hemorrhage, hydrocephalus, or mass effect.      Impression:          Electronically signed by Manasa Reina MD on 06-18-24 at 0301    XR Chest 1 View [[id number]] Collected: 06/18/24 0231     Updated: 06/18/24 0231    Narrative:        Patient: JADE MARAVILLA  Time Out: 02:31  Exam(s): XR CXR 1 VIEW     EXAM:    XR Chest, 1 View    CLINICAL HISTORY:     Reason for exam: AMS.    TECHNIQUE:    Frontal view of the chest.    COMPARISON:  3 25 2024    FINDINGS:    See Impression.    IMPRESSION:    Prominent interstitial markings are seen.  Correlate with edema or   infection.  No effusion.  Unchanged heart size.    Impression:          Electronically signed by Manasa Reina MD on 06-18-24 at 0231              Assessment:  Active Hospital Problems    Diagnosis  POA    **UTI (urinary tract infection), bacterial [N39.0, A49.9]  Unknown    ARF (acute renal failure) [N17.9]  Unknown    Failure to thrive in adult [R62.7]  Yes    Dysphagia [R13.10]  Yes    Generalized weakness [R53.1]  Yes    DISH (disseminated idiopathic skeletal hyperostosis) [M48.10]  Yes    Immobility [Z74.09]  Yes    Generalized pain [R52]  Yes     Hypertension [I10]  Yes      Resolved Hospital Problems   No resolved problems to display.       Plan:    UTI treated and is ESBL based on past history and culture results   -Nitrofurantoin utilized prior to admission will be insufficient for this UTI   -Continue Invanz   -Hold methotrexate-BC pending x 2   -Urology consult pending from moderate bilateral hydronephrosis   -Procalcitonin likely skewed from renal disease      ARF with renal consult pending   -Mariscal in place -urine of normal color with sediment noted in tubing    -Continue IVF and await further renal recommendations   -Terazosin but no renal insulting meds noted per review of MAR provided   -Likely multifactorial -pre and post    Failure to thrive with chronic dysphagia reported generalized weakness as well as chronic immobility   -I am not exactly sure how to interpret current situation.  The patient is not conversational but also was not mute.  He Continus just sitting in bed grunting when I asked questions.  His overall body habitus is cachectic and very concerning but he already has a diagnosis of failure to thrive.  Long-term prognosis very guarded      Chronic generalized pain -hesitant to give narcotics based on current clinical situation    Dysphagia with feeding tube though apparently eats modified diet   -Continue modified diet have speech follow   -Colonic dilatation noted, monitor for now.  Allow diet and monitor for any GI aversion    SCDs for prophylaxis    On Prevacid send no additional GI prophylaxis warranted    Home MAR provided reviewed and reconciled        Further recommendations to follow as clinical course unfolds    Conor Balbuena MD  6/18/2024  11:12 EDT      Electronically signed by Conor Balbuena MD at 06/18/24 1121          Emergency Department Notes        Lisa Jean Baptiste, RN at 06/18/24 0949          .Nursing report ED to floor  Anthony Gallegos  79 y.o.  male    HPI :  HPI (Adult)  Stated Reason for Visit:  pain all over, hx of chronic pain. possible abnormal speech.    Chief Complaint  Chief Complaint   Patient presents with    Pain       Admitting doctor:   Conor Balbuena MD    Admitting diagnosis:   The primary encounter diagnosis was Acute renal failure, unspecified acute renal failure type. Diagnoses of Chronic anemia, Hyponatremia, Urinary tract infection associated with indwelling urethral catheter, initial encounter, and Acute urinary retention were also pertinent to this visit.    Code status:   Current Code Status       Date Active Code Status Order ID Comments User Context       6/18/2024 0231 CPR (Attempt to Resuscitate) [id number]  Rosa Cormier APRN ED        Question Answer    Code Status (Patient has no pulse and is not breathing) CPR (Attempt to Resuscitate)    Medical Interventions (Patient has pulse or is breathing) Full Support                    Allergies:   Patient has no known allergies.    Isolation:   Contact    Intake and Output    Intake/Output Summary (Last 24 hours) at 6/18/2024 0948  Last data filed at 6/18/2024 0723  Gross per 24 hour   Intake 600 ml   Output 1500 ml   Net -900 ml       Weight:       06/17/24  2351   Weight: 63.1 kg (139 lb 1.6 oz)       Most recent vitals:   Vitals:    06/18/24 0401 06/18/24 0601 06/18/24 0701 06/18/24 0821   BP: 129/58 122/61 118/57 124/58   Pulse: 98 97 98 98   Resp:       Temp:       TempSrc:       SpO2: 99% 99% 100% 100%   Weight:       Height:           Active LDAs/IV Access:   Lines, Drains & Airways       Active LDAs       Name Placement date Placement time Site Days    Peripheral IV 06/18/24 0712 Anterior;Left Forearm 06/18/24  0712  Forearm  less than 1    Gastrostomy/Enterostomy Percutaneous endoscopic gastrostomy (PEG) 20 Fr. LUQ 03/29/24  0715  LUQ  81    Urethral Catheter 16 Fr. 06/18/24  0247  -- less than 1                    Labs (abnormal labs have a star):   Labs Reviewed   COMPREHENSIVE METABOLIC PANEL - Abnormal; Notable  "for the following components:       Result Value    Glucose 111 (*)      (*)     Creatinine 3.75 (*)     Sodium 130 (*)     Potassium 5.7 (*)     Chloride 96 (*)     CO2 20.5 (*)     Albumin 2.9 (*)     BUN/Creatinine Ratio 27.5 (*)     eGFR 15.6 (*)     All other components within normal limits    Narrative:     GFR Normal >60  Chronic Kidney Disease <60  Kidney Failure <15    The GFR formula is only valid for adults with stable renal function between ages 18 and 70.   URINALYSIS W/ MICROSCOPIC IF INDICATED (NO CULTURE) - Abnormal; Notable for the following components:    Color, UA Dark Yellow (*)     Appearance, UA Turbid (*)     Ketones, UA Trace (*)     Blood, UA Large (3+) (*)     Protein,  mg/dL (2+) (*)     Leuk Esterase, UA Large (3+) (*)     All other components within normal limits   PROCALCITONIN - Abnormal; Notable for the following components:    Procalcitonin 21.30 (*)     All other components within normal limits    Narrative:     As a Marker for Sepsis (Non-Neonates):    1. <0.5 ng/mL represents a low risk of severe sepsis and/or septic shock.  2. >2 ng/mL represents a high risk of severe sepsis and/or septic shock.    As a Marker for Lower Respiratory Tract Infections that require antibiotic therapy:    PCT on Admission    Antibiotic Therapy       6-12 Hrs later    >0.5                Strongly Recommended  >0.25 - <0.5        Recommended   0.1 - 0.25          Discouraged              Remeasure/reassess PCT  <0.1                Strongly Discouraged     Remeasure/reassess PCT    As 28 day mortality risk marker: \"Change in Procalcitonin Result\" (>80% or <=80%) if Day 0 (or Day 1) and Day 4 values are available. Refer to http://www.[employer]-pct-calculator.com    Change in PCT <=80%  A decrease of PCT levels below or equal to 80% defines a positive change in PCT test result representing a higher risk for 28-day all-cause mortality of patients diagnosed with severe sepsis for septic " shock.    Change in PCT >80%  A decrease of PCT levels of more than 80% defines a negative change in PCT result representing a lower risk for 28-day all-cause mortality of patients diagnosed with severe sepsis or septic shock.      MAGNESIUM - Abnormal; Notable for the following components:    Magnesium 2.5 (*)     All other components within normal limits   CBC WITH AUTO DIFFERENTIAL - Abnormal; Notable for the following components:    RBC 3.86 (*)     Hemoglobin 10.5 (*)     Hematocrit 31.7 (*)     RDW 15.7 (*)     All other components within normal limits   MANUAL DIFFERENTIAL - Abnormal; Notable for the following components:    Lymphocyte % 14.4 (*)     All other components within normal limits   URINALYSIS, MICROSCOPIC ONLY - Abnormal; Notable for the following components:    RBC, UA 6-10 (*)     WBC, UA Too Numerous to Count (*)     Bacteria, UA 4+ (*)     Squamous Epithelial Cells, UA 3-6 (*)     All other components within normal limits   BASIC METABOLIC PANEL - Abnormal; Notable for the following components:    BUN 89 (*)     Creatinine 3.57 (*)     Sodium 134 (*)     CO2 20.5 (*)     Calcium 8.5 (*)     eGFR 16.5 (*)     All other components within normal limits    Narrative:     GFR Normal >60  Chronic Kidney Disease <60  Kidney Failure <15    The GFR formula is only valid for adults with stable renal function between ages 18 and 70.   CBC (NO DIFF) - Abnormal; Notable for the following components:    RBC 3.82 (*)     Hemoglobin 10.4 (*)     Hematocrit 31.2 (*)     All other components within normal limits   URIC ACID - Abnormal; Notable for the following components:    Uric Acid 9.1 (*)     All other components within normal limits   LACTIC ACID, PLASMA - Normal   BLOOD CULTURE   BLOOD CULTURE   CANDIDA AURIS SCREEN   URINE CULTURE   SODIUM, URINE, RANDOM   CHLORIDE, URINE, RANDOM   CBC AND DIFFERENTIAL    Narrative:     The following orders were created for panel order CBC & Differential.  Procedure                                Abnormality         Status                     ---------                               -----------         ------                     CBC Auto Differential[[id number]]        Abnormal            Final result                 Please view results for these tests on the individual orders.       EKG:   No orders to display       Meds given in ED:   Medications   sodium chloride 0.9 % flush 10 mL (10 mL Intravenous Given 6/18/24 0330)   sodium chloride 0.9 % flush 10 mL (has no administration in time range)   sodium chloride 0.9 % infusion 40 mL (has no administration in time range)   sodium chloride 0.9 % infusion (100 mL/hr Intravenous New Bag 6/18/24 3443)   acetaminophen (TYLENOL) tablet 650 mg (has no administration in time range)     Or   acetaminophen (TYLENOL) 160 MG/5ML oral solution 650 mg (has no administration in time range)     Or   acetaminophen (TYLENOL) suppository 650 mg (has no administration in time range)   sennosides-docusate (PERICOLACE) 8.6-50 MG per tablet 2 tablet (has no administration in time range)     And   polyethylene glycol (MIRALAX) packet 17 g (has no administration in time range)     And   bisacodyl (DULCOLAX) EC tablet 5 mg (has no administration in time range)     And   bisacodyl (DULCOLAX) suppository 10 mg (has no administration in time range)   ondansetron ODT (ZOFRAN-ODT) disintegrating tablet 4 mg (has no administration in time range)     Or   ondansetron (ZOFRAN) injection 4 mg (has no administration in time range)   calcium carbonate (TUMS) chewable tablet 500 mg (200 mg elemental) (has no administration in time range)   ertapenem (INVanz) 1,000 mg in sodium chloride 0.9 % 100 mL MBP (has no administration in time range)   HYDROcodone-acetaminophen (NORCO) 5-325 MG per tablet 1 tablet (has no administration in time range)   sodium chloride 0.9 % bolus 500 mL (0 mL Intravenous Stopped 6/18/24 0322)   cefTRIAXone (ROCEPHIN) 1,000 mg in sodium chloride 0.9  % 100 mL MBP (0 mg Intravenous Stopped 6/18/24 0247)   ertapenem (INVanz) 1,000 mg in sodium chloride 0.9 % 100 mL MBP (0 mg Intravenous Stopped 6/18/24 0322)       Imaging results:  CT Abdomen Pelvis Without Contrast    Result Date: 6/18/2024  Electronically signed by Haider Kuhn MD on 06-18-24 at 0406    CT Head Without Contrast    Result Date: 6/18/2024  Electronically signed by Manasa Reina MD on 06-18-24 at 0301    XR Chest 1 View    Result Date: 6/18/2024  Electronically signed by Manasa Reina MD on 06-18-24 at 0231     Ambulatory status:   -     Social issues:   Social History     Socioeconomic History    Marital status:     Number of children: 2   Tobacco Use    Smoking status: Never    Smokeless tobacco: Never   Vaping Use    Vaping status: Never Used   Substance and Sexual Activity    Alcohol use: No    Drug use: No    Sexual activity: Defer       Peripheral Neurovascular  Peripheral Neurovascular (Adult)  Peripheral Neurovascular WDL: WDL    Neuro Cognitive  Neuro Cognitive (Adult)  Cognitive/Neuro/Behavioral WDL: .WDL except, level of consciousness  Level of Consciousness: Responds to Voice  Orientation: disoriented to, place, time, situation  Speech: garbled  Judith Coma Scale  Best Eye Response: 3-->(E3) to speech  Best Motor Response: 5-->(M5) localizes pain  Best Verbal Response: 2-->(V2) incomprehensible speech  Judith Coma Scale Score: 10    Learning  Learning Assessment (Adult)  Learning Readiness and Ability: cognitive limitation noted  Education Provided  Person Taught: patient, family member/friend    Respiratory  Respiratory WDL  Respiratory WDL: WDL    Abdominal Pain  Safety Interventions  Safety Precautions/Falls Reduction: family at bedside    Pain Assessments  Pain (Adult)  Preferred Pain Scale: FACES (Mckeon-Baker FACES Pain Rating Scale)  Patient requested Medication Prescribed for Lower Pain Scale: No  FACES Pain Rating: Rest: 0-->no hurt  FACES Pain Rating: Activity:  0-->no hurt  Pain Location: abdomen, knee (right knee is swollen snd tender to touch)  Nonverbal Indicators of Pain: nonverbal indicators absent           Lisa Jean Baptiste RN  06/18/24 09:48 EDT      Electronically signed by Lisa Jean Baptiste RN at 06/18/24 0948       Juan Crowley MD at 06/18/24 0056           EMERGENCY DEPARTMENT MD ATTESTATION NOTE    Room Number:  35/35  PCP: Keshav Eason MD  Independent Historians: Patient, Family, and EMS    HPI:  A complete HPI/ROS/PMH/PSH/SH/FH are unobtainable due to: None    Chronic or social conditions impacting patient care (Social Determinants of Health): None      Context: Anthony Gallegos is a 79 y.o. male with a medical history of ankylosing spondylitis, acute kidney injury, hypertension who presents to the ED c/o acute pain.  EMS reports the patient was complaining of pain all over his body.  He has a history of chronic pain.  The nursing home nurse also notes that the patient had some abnormal speech but she has never taken care of him in the past and show she did not notice normal speech.  Son is here and states that his speech is normal for him and he is in his general normal state of health.  He reports he eats 2 meals a day and uses a feeding tube.  Son reports that he had his Mariscal catheter had a few days ago and had a UTI.        Review of prior external notes (non-ED) -and- Review of prior external test results outside of this encounter:  Discharge summary dated 4/9/2024 with a UTI and decreased mobility.    Prescription drug monitoring program review:           PHYSICAL EXAM    I have reviewed the triage vital signs and nursing notes.    ED Triage Vitals [06/17/24 2321]   Temp Heart Rate Resp BP SpO2   98.6 °F (37 °C) 110 22 108/68 96 %      Temp src Heart Rate Source Patient Position BP Location FiO2 (%)   Oral -- -- -- --       Physical Exam  GENERAL: Awake, alert, chronically ill-appearing  SKIN: Warm, dry  HENT: Normocephalic, atraumatic  EYES: no  scleral icterus  CV: regular rhythm, tachycardic rate  RESPIRATORY: normal effort, lungs clear  ABDOMEN: soft, nontender, nondistended  MUSCULOSKELETAL: no deformity  NEURO: alert, moves all extremities, follows commands            MEDICATIONS GIVEN IN ER  Medications   sodium chloride 0.9 % flush 10 mL (has no administration in time range)   sodium chloride 0.9 % flush 10 mL (has no administration in time range)   sodium chloride 0.9 % infusion 40 mL (has no administration in time range)   sodium chloride 0.9 % infusion (has no administration in time range)   acetaminophen (TYLENOL) tablet 650 mg (has no administration in time range)     Or   acetaminophen (TYLENOL) 160 MG/5ML oral solution 650 mg (has no administration in time range)     Or   acetaminophen (TYLENOL) suppository 650 mg (has no administration in time range)   sennosides-docusate (PERICOLACE) 8.6-50 MG per tablet 2 tablet (has no administration in time range)     And   polyethylene glycol (MIRALAX) packet 17 g (has no administration in time range)     And   bisacodyl (DULCOLAX) EC tablet 5 mg (has no administration in time range)     And   bisacodyl (DULCOLAX) suppository 10 mg (has no administration in time range)   ondansetron ODT (ZOFRAN-ODT) disintegrating tablet 4 mg (has no administration in time range)     Or   ondansetron (ZOFRAN) injection 4 mg (has no administration in time range)   calcium carbonate (TUMS) chewable tablet 500 mg (200 mg elemental) (has no administration in time range)   ertapenem (INVanz) 1,000 mg in sodium chloride 0.9 % 100 mL MBP (has no administration in time range)   HYDROcodone-acetaminophen (NORCO) 5-325 MG per tablet 1 tablet (has no administration in time range)   sodium chloride 0.9 % bolus 500 mL (0 mL Intravenous Stopped 6/18/24 0322)   cefTRIAXone (ROCEPHIN) 1,000 mg in sodium chloride 0.9 % 100 mL MBP (0 mg Intravenous Stopped 6/18/24 0247)   ertapenem (INVanz) 1,000 mg in sodium chloride 0.9 % 100 mL MBP (0  mg Intravenous Stopped 6/18/24 0322)         ORDERS PLACED DURING THIS VISIT:  Orders Placed This Encounter   Procedures    Blood Culture - Blood,    Blood Culture - Blood,    CANDIDA AURIS SCREEN - Swab, Axilla Right, Axilla Left and Groin    XR Chest 1 View    CT Head Without Contrast    CT Abdomen Pelvis Without Contrast    Comprehensive Metabolic Panel    Urinalysis With Microscopic If Indicated (No Culture) - Urine, Catheter    Lactic Acid, Plasma    Procalcitonin    Magnesium    CBC Auto Differential    Manual Differential    Urinalysis, Microscopic Only - Urine, Clean Catch    Urinalysis With Culture If Indicated -    Basic Metabolic Panel    CBC (No Diff)    Diet: Regular/House; Fluid Consistency: Thin (IDDSI 0)    Vital Signs    Intake & Output    Weigh Patient    Oral Care    Saline Lock & Maintain IV Access    Place Sequential Compression Device    Maintain Sequential Compression Device    Code Status and Medical Interventions:    [facility] (on-call MD unless specified) Details    Inpatient Nephrology Consult    Insert Peripheral IV    Inpatient Admission    CBC & Differential         PROCEDURES  Procedures            PROGRESS, DATA ANALYSIS, CONSULTS, AND MEDICAL DECISION MAKING  All labs have been independently interpreted by me.  All radiology studies have been reviewed by me. All EKG's have been independently viewed and interpreted by me.  Discussion below represents my analysis of pertinent findings related to patient's condition, differential diagnosis, treatment plan and final disposition.    Differential diagnosis includes but is not limited to UTI, sepsis, dehydration, hypercalcemia.    Clinical Scores:                   ED Course as of 06/18/24 0436   Mon Jun 17, 2024   9394 I discussed the case with Dr. Crowley and they agree to evaluate the patient at the bedside.    [CC]   Tue Jun 18, 2024   0043 XR Chest 1 View  My independent interpretation of the imaging study is patchy airspace disease  bilaterally [TR]   0105 Creatinine(!): 3.75 [TR]   0105 Hemoglobin(!): 10.5 [TR]   0113 Bacteria, UA(!): 4+ [TR]   0113 WBC, UA(!): Too Numerous to Count [TR]   0114 Lactate: 2.0 [TR]   0116 Procalcitonin(!): 21.30 [CC]   0119 I rechecked the patient.  I discussed the patient's labs, radiology findings (including all incidental findings), diagnosis, and plan for admission. The patient understands and agrees with the plan.   [CC]   0230 Spoke with JAMES Lee with [facility].  Reviewed history, exam, results, treatments.  She agrees admit the patient to Dr. Samuels.  CT pending at the time of admission    [CC]   0312 CT Head Without Contrast  My independent interpretation of the CT of the head is no acute intracranial hemorrhage [CC]   0312 CT Abdomen Pelvis Without Contrast  My independent interpretation of the CT of the abdomen pelvis is largely distended bladder.  Will place Mariscal catheter [CC]      ED Course User Index  [CC] Kailey Verduczo PA-C  [TR] Juan Crowley MD       MDM: The patient is chronically ill-appearing.  Plan chemistries and blood counts as well as lactic acid.  He is tachycardic.  We will obtain laboratory values.  Will give some IV fluids.  He is acutely and chronically ill-appearing.  I suspect he will require admission.      COMPLEXITY OF CARE  The patient requires admission.    Please note that portions of this document were completed with a voice recognition program.    Note Disclaimer: At [facility], we believe that sharing information builds trust and better relationships. You are receiving this note because you recently visited [facility]. It is possible you will see health information before a provider has talked with you about it. This kind of information can be easy to misunderstand. To help you fully understand what it means for your health, we urge you to discuss this note with your provider.         Juan Crowley MD  06/18/24 7030      Electronically signed by Keo  Juan PEREZ MD at 06/18/24 0436       Kailey Verduzco PA-C at 06/17/24 4740       Attestation signed by Juan Crowley MD at 06/18/24 6321        SHARED APC FACE TO FACE: I performed a substantive part of the MDM during the patient's E/M visit. I personally evaluated and examined the patient. I personally made or approved the documented management plan and acknowledge its risk of complications.  and Refer to my separate independent documentation.  Juan Crowley MD 6/18/2024 04:21 EDT                          EMERGENCY DEPARTMENT ENCOUNTER  Room Number:  35/35  PCP: Keshav Eason MD  Independent Historians: Family and EMS      HPI:  Chief Complaint: had concerns including Pain.     A complete HPI/ROS/PMH/PSH/SH/FH are unobtainable due to: None    Chronic or social conditions impacting patient care (Social Determinants of Health): None      Context: Anthony Gallegos is a 79 y.o. male with a medical history of enclosing spondylitis, MARTITA, hypertension, and BPH presents emergency department from the nursing facility with reported body wide pain and decreased level of alertness.  The nursing home also states that the patient may have had some abnormal speech but the nurse who is caring for him tonight had never taken care of him in the past and was uncertain of his baseline.  His son at the bedside said he is speech is normal for him this is his general normal state of health as he saw him yesterday during Father's Day and he was the same as he is today.  The son said his sister mentioned this is what happened the last time they took his catheter out.  The patient does not currently have a Mariscal catheter in place.  History is limited as patient is unable to give history for himself and the entirety of history was given by nursing facility and son at the bedside.      Review of prior external notes (non-ED) -and- Review of prior external test results outside of this encounter:   Patient was admitted to the hospital  from 3/25/2024 to 4/9/2024 for failure to thrive, UTI, and MARTITA.  Patient was having urinary retention while in the hospital and required Mariscal catheter placement he failed voiding trial and was discharged with a Mariscal catheter in place.  Due to failure to thrive and dysphagia patient is on a feeding tube diet.  He received a PEG tube placement at this hospitalization.  He completed antibiotics inpatient    I reviewed labs from 4/4/2024, creatinine 0.69, BUN 28      PAST MEDICAL HISTORY  Active Ambulatory Problems     Diagnosis Date Noted    Ankylosing spondylitis of cervical region 06/29/2017    Recent unexplained weight loss 07/14/2017    Abnormal serum lipase level 08/10/2017    Abnormal serum level of amylase 08/10/2017    Hypertension 10/19/2017    Boil 03/22/2018    Immobility 12/20/2023    Fall from bed 12/20/2023    Tetrahydrocannabinol (THC) use disorder, mild, abuse 12/20/2023    Generalized pain 12/20/2023     12/20/2023    Grief 12/20/2023    DISH (disseminated idiopathic skeletal hyperostosis) 12/20/2023    Generalized weakness 12/21/2023    Severe malnutrition 12/24/2023    Altered mental status 01/21/2024    Transient alteration of awareness 01/22/2024    GERD without esophagitis 01/22/2024    BPH (benign prostatic hyperplasia) 01/22/2024    UTI (urinary tract infection) 01/22/2024    Hyperglycemia 01/22/2024    Decreased oral intake 03/25/2024    Dysphagia 03/25/2024    Failure to thrive in adult 04/09/2024    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     Resolved Ambulatory Problems     Diagnosis Date Noted    Urine retention 12/20/2023    Constipation 12/20/2023    Leukocytosis 01/22/2024    Dehydration 01/22/2024    MARTITA (acute kidney injury) 01/22/2024    Altered mental state 01/22/2024    Encephalopathy 03/26/2024     Past Medical History:   Diagnosis Date    Abscess of scrotum     Arthritis     AS (ankylosing spondylitis)     History of MRSA infection     Uveitis          PAST  SURGICAL HISTORY  Past Surgical History:   Procedure Laterality Date    COLONOSCOPY      ENDOSCOPY W/ PEG TUBE PLACEMENT N/A 3/29/2024    Procedure: ESOPHAGOGASTRODUODENOSCOPY WITH PERCUTANEOUS ENDOSCOPIC GASTROSTOMY TUBE INSERTION;  Surgeon: Khadar Broderick MD;  Location: [facility] ENDOSCOPY;  Service: General;  Laterality: N/A;  PRE/POST - DYSPHAGIA    ROTATOR CUFF REPAIR Right     TOTAL HIP ARTHROPLASTY Left 2014         FAMILY HISTORY  Family History   Problem Relation Age of Onset    Alzheimer's disease Mother          SOCIAL HISTORY  Social History     Socioeconomic History    Marital status:     Number of children: 2   Tobacco Use    Smoking status: Never    Smokeless tobacco: Never   Vaping Use    Vaping status: Never Used   Substance and Sexual Activity    Alcohol use: No    Drug use: No    Sexual activity: Defer         ALLERGIES  Patient has no known allergies.      REVIEW OF SYSTEMS  Included in HPI  All systems reviewed and negative except for those discussed in HPI.      PHYSICAL EXAM    I have reviewed the triage vital signs and nursing notes.    ED Triage Vitals [06/17/24 2321]   Temp Heart Rate Resp BP SpO2   98.6 °F (37 °C) 110 22 108/68 96 %      Temp src Heart Rate Source Patient Position BP Location FiO2 (%)   Oral -- -- -- --       Physical Exam  Constitutional:       Comments: Fairly obtunded, cachectic and chronically ill-appearing.  Will minimally open his eyes when stimulated and will withdraw to pain   HENT:      Head: Normocephalic and atraumatic.      Mouth/Throat:      Mouth: Mucous membranes are moist.   Eyes:      Extraocular Movements: Extraocular movements intact.      Pupils: Pupils are equal, round, and reactive to light.   Cardiovascular:      Rate and Rhythm: Normal rate and regular rhythm.      Pulses: Normal pulses.      Heart sounds: Normal heart sounds.   Pulmonary:      Effort: Pulmonary effort is normal. No respiratory distress.      Breath sounds: Normal  breath sounds. No wheezing, rhonchi or rales.   Abdominal:      General: Abdomen is flat. There is no distension.      Palpations: Abdomen is soft.      Tenderness: There is abdominal tenderness (diffuse abdominal tenderness). There is no guarding or rebound.   Musculoskeletal:         General: Normal range of motion.      Cervical back: Normal range of motion and neck supple.   Skin:     General: Skin is warm and dry.      Capillary Refill: Capillary refill takes less than 2 seconds.   Neurological:      Mental Status: He is oriented to person, place, and time. Mental status is at baseline.   Psychiatric:         Mood and Affect: Mood normal.         Behavior: Behavior normal.         LAB RESULTS  Recent Results (from the past 24 hour(s))   Comprehensive Metabolic Panel    Collection Time: 06/18/24 12:33 AM    Specimen: Blood   Result Value Ref Range    Glucose 111 (H) 65 - 99 mg/dL     (H) 8 - 23 mg/dL    Creatinine 3.75 (H) 0.76 - 1.27 mg/dL    Sodium 130 (L) 136 - 145 mmol/L    Potassium 5.7 (H) 3.5 - 5.2 mmol/L    Chloride 96 (L) 98 - 107 mmol/L    CO2 20.5 (L) 22.0 - 29.0 mmol/L    Calcium 9.1 8.6 - 10.5 mg/dL    Total Protein 8.3 6.0 - 8.5 g/dL    Albumin 2.9 (L) 3.5 - 5.2 g/dL    ALT (SGPT) 25 1 - 41 U/L    AST (SGOT) 34 1 - 40 U/L    Alkaline Phosphatase 78 39 - 117 U/L    Total Bilirubin 0.6 0.0 - 1.2 mg/dL    Globulin 5.4 gm/dL    A/G Ratio 0.5 g/dL    BUN/Creatinine Ratio 27.5 (H) 7.0 - 25.0    Anion Gap 13.5 5.0 - 15.0 mmol/L    eGFR 15.6 (L) >60.0 mL/min/1.73   Lactic Acid, Plasma    Collection Time: 06/18/24 12:33 AM    Specimen: Blood   Result Value Ref Range    Lactate 2.0 0.5 - 2.0 mmol/L   Procalcitonin    Collection Time: 06/18/24 12:33 AM    Specimen: Blood   Result Value Ref Range    Procalcitonin 21.30 (H) 0.00 - 0.25 ng/mL   Magnesium    Collection Time: 06/18/24 12:33 AM    Specimen: Blood   Result Value Ref Range    Magnesium 2.5 (H) 1.6 - 2.4 mg/dL   CBC Auto Differential     Collection Time: 06/18/24 12:33 AM    Specimen: Blood   Result Value Ref Range    WBC 5.95 3.40 - 10.80 10*3/mm3    RBC 3.86 (L) 4.14 - 5.80 10*6/mm3    Hemoglobin 10.5 (L) 13.0 - 17.7 g/dL    Hematocrit 31.7 (L) 37.5 - 51.0 %    MCV 82.1 79.0 - 97.0 fL    MCH 27.2 26.6 - 33.0 pg    MCHC 33.1 31.5 - 35.7 g/dL    RDW 15.7 (H) 12.3 - 15.4 %    RDW-SD 46.7 37.0 - 54.0 fl    MPV 8.3 6.0 - 12.0 fL    Platelets 281 140 - 450 10*3/mm3    nRBC 0.0 0.0 - 0.2 /100 WBC   Manual Differential    Collection Time: 06/18/24 12:33 AM    Specimen: Blood   Result Value Ref Range    Neutrophil % 73.2 42.7 - 76.0 %    Lymphocyte % 14.4 (L) 19.6 - 45.3 %    Monocyte % 10.3 5.0 - 12.0 %    Eosinophil % 1.0 0.3 - 6.2 %    Basophil % 0.0 0.0 - 1.5 %    Atypical Lymphocyte % 1.0 0.0 - 5.0 %    Neutrophils Absolute 4.36 1.70 - 7.00 10*3/mm3    Lymphocytes Absolute 0.92 0.70 - 3.10 10*3/mm3    Monocytes Absolute 0.61 0.10 - 0.90 10*3/mm3    Eosinophils Absolute 0.06 0.00 - 0.40 10*3/mm3    Basophils Absolute 0.00 0.00 - 0.20 10*3/mm3    Anisocytosis Slight/1+ None Seen    Macrocytes Slight/1+ None Seen    Poikilocytes Slight/1+ None Seen    Polychromasia Slight/1+ None Seen    WBC Morphology Normal Normal    Platelet Morphology Normal Normal   Urinalysis With Microscopic If Indicated (No Culture) - Straight Cath    Collection Time: 06/18/24 12:34 AM    Specimen: Straight Cath; Urine   Result Value Ref Range    Color, UA Dark Yellow (A) Yellow, Straw    Appearance, UA Turbid (A) Clear    pH, UA 5.5 5.0 - 8.0    Specific Gravity, UA 1.015 1.005 - 1.030    Glucose, UA Negative Negative    Ketones, UA Trace (A) Negative    Bilirubin, UA Negative Negative    Blood, UA Large (3+) (A) Negative    Protein,  mg/dL (2+) (A) Negative    Leuk Esterase, UA Large (3+) (A) Negative    Nitrite, UA Negative Negative    Urobilinogen, UA 0.2 E.U./dL 0.2 - 1.0 E.U./dL   Urinalysis, Microscopic Only - Straight Cath    Collection Time: 06/18/24 12:34 AM     Specimen: Straight Cath; Urine   Result Value Ref Range    RBC, UA 6-10 (A) None Seen, 0-2 /HPF    WBC, UA Too Numerous to Count (A) None Seen, 0-2 /HPF    Bacteria, UA 4+ (A) None Seen /HPF    Squamous Epithelial Cells, UA 3-6 (A) None Seen, 0-2 /HPF    Hyaline Casts, UA 21-30 None Seen /LPF    Methodology Automated Microscopy          RADIOLOGY  CT Abdomen Pelvis Without Contrast    Result Date: 6/18/2024  Patient: JADE MARAVILLA  Time Out: 04:06 Exam(s): CT ABDOMEN + PELVIS Without Contrast EXAM:   CT Abdomen and Pelvis Without Intravenous Contrast CLINICAL HISTORY:    Reason for exam: abdominal pain, renal failure. TECHNIQUE:   Axial computed tomography images of the abdomen and pelvis without intravenous contrast.  CTDI is 15.69 mGy and DLP is 827.7 mGy-cm.  This CT exam was performed according to the principle of ALARA (As Low As Reasonably Achievable) by using one or more of the following dose reduction techniques: automated exposure control, adjustment of the mA and or kV according to patient size, and or use of iterative reconstruction technique. COMPARISON:   March 25, 2024 scattered fibrotic changes in the lung bases. FINDINGS:   Lung bases:  Unremarkable.  No mass.  No consolidation.  ABDOMEN:   Liver:  Unremarkable.   Gallbladder and bile ducts:  Unremarkable.  No calcified stones.  No ductal dilation.   Pancreas:  Unremarkable.  No ductal dilation.   Spleen:  Unremarkable.  No splenomegaly.   Adrenals:  Unremarkable.  No mass.   Kidneys and ureters:  Mild to moderate bilateral hydronephrosis and hydroureter.  No ureterolithiasis is seen.  Likely urinary retention.  5. 3 cm simple cyst in the lower pole the right kidney.  No follow-up is required.   Stomach and bowel:  Gaseous distention of the transverse colon.  Possible mild colonic ileus.  The sigmoid colon is partially compressed by the enlarged urinary bladder.  No mucosal thickening.  PELVIS:   Appendix:  No findings to suggest acute  appendicitis.   Bladder:  There is dilation of the urinary bladder measuring 20 cm craniocaudad.  There is irregular wall thickening with a 7 cm bladder diverticulum near the vertex, similar to previous.   Reproductive:  Unremarkable as visualized.  ABDOMEN and PELVIS:   Intraperitoneal space:  Unremarkable.  No free air.  No significant fluid collection.   Bones joints:  There is metallic artifact from a left hip arthroplasty.  No acute fracture or dislocation is seen.  Ankylosis of the spine.  No acute fracture is seen.   Soft tissues:  Unremarkable.   Vasculature:  Unremarkable.  No abdominal aortic aneurysm.   Lymph nodes:  Unremarkable.  No enlarged lymph nodes.   Tubes, lines and devices:  There is a PEG tube in the stomach.  The stomach is decompressed. IMPRESSION:     1.  There is dilation of the urinary bladder measuring 20 cm craniocaudad.   There is irregular wall thickening with a 7 cm bladder diverticulum near the vertex, similar to previous.  Consider chronic bladder outlet obstruction and urinary retention. 2.  Mild to moderate bilateral hydronephrosis and hydroureter.  No ureterolithiasis is seen.  Likely urinary retention. 3.  Gaseous distention of the transverse colon.  Possible mild colonic ileus.  The sigmoid colon is partially compressed by the enlarged urinary bladder.     Electronically signed by Haider Kuhn MD on 06-18-24 at 0406    CT Head Without Contrast    Result Date: 6/18/2024  Patient: JADE MARAVILLA  Time Out: 03:01 Exam(s): CT HEAD Without Contrast EXAM:   CT Head Without Intravenous Contrast CLINICAL HISTORY:    Reason for exam: AMS. TECHNIQUE:   Axial computed tomography images of the head brain without intravenous contrast.  CTDI is 55.7 mGy and DLP is 1018.9 mGy-cm.  This CT exam was performed according to the principle of ALARA (As Low As Reasonably Achievable) by using one or more of the following dose reduction techniques: automated exposure control, adjustment of the mA  and or kV according to patient size, and or use of iterative reconstruction technique. COMPARISON: 3.29.24 FINDINGS:   Brain:  No hemorrhage, herniation, or mass effect.  Chronic microvascular ischemic changes.   Ventricles:  No hydrocephalus.  Age related cerebral volume loss.   Bones joints:  Unremarkable.   Soft tissues:  Unremarkable.   Sinuses:  No air fluid levels.   Mastoid air cells:  Clear. IMPRESSION:     No acute hemorrhage, hydrocephalus, or mass effect.     Electronically signed by Manasa Reina MD on 06-18-24 at 0301    XR Chest 1 View    Result Date: 6/18/2024  Patient: JADE MARAVILLA  Time Out: 02:31 Exam(s): XR CXR 1 VIEW EXAM:   XR Chest, 1 View CLINICAL HISTORY:    Reason for exam: AMS. TECHNIQUE:   Frontal view of the chest. COMPARISON: 3 25 2024 FINDINGS: See Impression. IMPRESSION: Prominent interstitial markings are seen.  Correlate with edema or infection.  No effusion.  Unchanged heart size.    Electronically signed by Manasa Reina MD on 06-18-24 at 0231       MEDICATIONS GIVEN IN ER  Medications   sodium chloride 0.9 % flush 10 mL (has no administration in time range)   sodium chloride 0.9 % flush 10 mL (has no administration in time range)   sodium chloride 0.9 % infusion 40 mL (has no administration in time range)   sodium chloride 0.9 % infusion (has no administration in time range)   acetaminophen (TYLENOL) tablet 650 mg (has no administration in time range)     Or   acetaminophen (TYLENOL) 160 MG/5ML oral solution 650 mg (has no administration in time range)     Or   acetaminophen (TYLENOL) suppository 650 mg (has no administration in time range)   sennosides-docusate (PERICOLACE) 8.6-50 MG per tablet 2 tablet (has no administration in time range)     And   polyethylene glycol (MIRALAX) packet 17 g (has no administration in time range)     And   bisacodyl (DULCOLAX) EC tablet 5 mg (has no administration in time range)     And   bisacodyl (DULCOLAX) suppository 10 mg (has no  administration in time range)   ondansetron ODT (ZOFRAN-ODT) disintegrating tablet 4 mg (has no administration in time range)     Or   ondansetron (ZOFRAN) injection 4 mg (has no administration in time range)   calcium carbonate (TUMS) chewable tablet 500 mg (200 mg elemental) (has no administration in time range)   ertapenem (INVanz) 1,000 mg in sodium chloride 0.9 % 100 mL MBP (has no administration in time range)   HYDROcodone-acetaminophen (NORCO) 5-325 MG per tablet 1 tablet (has no administration in time range)   sodium chloride 0.9 % bolus 500 mL (0 mL Intravenous Stopped 6/18/24 0322)   cefTRIAXone (ROCEPHIN) 1,000 mg in sodium chloride 0.9 % 100 mL MBP (0 mg Intravenous Stopped 6/18/24 0247)   ertapenem (INVanz) 1,000 mg in sodium chloride 0.9 % 100 mL MBP (0 mg Intravenous Stopped 6/18/24 0322)           OUTPATIENT MEDICATION MANAGEMENT:  Current Facility-Administered Medications Ordered in Epic   Medication Dose Route Frequency Provider Last Rate Last Admin    acetaminophen (TYLENOL) tablet 650 mg  650 mg Oral Q4H PRN Rosa Cormier APRN        Or    acetaminophen (TYLENOL) 160 MG/5ML oral solution 650 mg  650 mg Oral Q4H PRN Rosa Cormier APRN        Or    acetaminophen (TYLENOL) suppository 650 mg  650 mg Rectal Q4H PRN Rosa Cormier APRN        sennosides-docusate (PERICOLACE) 8.6-50 MG per tablet 2 tablet  2 tablet Oral BID PRN Rosa Cormier APRN        And    polyethylene glycol (MIRALAX) packet 17 g  17 g Oral Daily PRN Rosa Cormier APRN        And    bisacodyl (DULCOLAX) EC tablet 5 mg  5 mg Oral Daily PRN Rosa Cormier APRN        And    bisacodyl (DULCOLAX) suppository 10 mg  10 mg Rectal Daily PRN Rosa Cormier APRN        calcium carbonate (TUMS) chewable tablet 500 mg (200 mg elemental)  2 tablet Oral BID PRN Rosa Cormier APRN        [START ON 6/19/2024] ertapenem (INVanz) 1,000 mg in sodium chloride 0.9 % 100 mL MBP  1,000 mg  Intravenous Q24H Rosa Cormier APRN        HYDROcodone-acetaminophen (NORCO) 5-325 MG per tablet 1 tablet  1 tablet Oral Q6H PRN Rosa Cormier APRN        ondansetron ODT (ZOFRAN-ODT) disintegrating tablet 4 mg  4 mg Oral Q6H PRN Rosa Cormier APRN        Or    ondansetron (ZOFRAN) injection 4 mg  4 mg Intravenous Q6H PRN Rosa Cormier APRN        sodium chloride 0.9 % flush 10 mL  10 mL Intravenous Q12H Rosa Cormier APRN        sodium chloride 0.9 % flush 10 mL  10 mL Intravenous PRN Rosa Cormier APRN        sodium chloride 0.9 % infusion 40 mL  40 mL Intravenous PRN Rosa Cormier APRN        sodium chloride 0.9 % infusion  100 mL/hr Intravenous Continuous Rosa Cormier APRN         Current Outpatient Medications Ordered in Epic   Medication Sig Dispense Refill    lansoprazole (PREVACID SOLUTAB) 30 MG Tablet Delayed Release Dispersible disintegrating tablet Administer 1 tablet per G tube Every Morning.      melatonin 3 MG tablet Take 1 tablet by mouth At Night As Needed for Sleep.      methotrexate 2.5 MG tablet Take 1 tablet by mouth 1 (One) Time Per Week. Take 2.5 mg on Wednesday and 2.5 mg on Thursday.  Do not take any medication on Friday through Tuesday.      nitrofurantoin, macrocrystal-monohydrate, (MACROBID) 100 MG capsule Take 1 capsule by mouth 2 (Two) Times a Day.      Omega-3 Fatty Acids (OMEGA 3 PO) Take 1 capsule by mouth Daily.      polyethylene glycol 17 g packet 1 packet daily per tube      potassium chloride (KAYCIEL) 20 mEq/15 mL solution Take 15 mL by mouth Daily.      sennosides-docusate (PERICOLACE) 8.6-50 MG per tablet Administer 2 tablets per G tube Daily.      terazosin (HYTRIN) 1 MG capsule Administer 1 capsule per G tube Every Night.      acetaminophen (TYLENOL) 325 MG tablet Take 2 tablets by mouth Every 4 (Four) Hours As Needed for Mild Pain.      bisacodyl (DULCOLAX) 5 MG EC tablet Take 1 tablet by mouth Daily As Needed  for Constipation (Use if polyethylene glycol is ineffective).      Menthol-Zinc Oxide 0.44-20.6 % ointment Apply 1 Application topically to the appropriate area as directed Every 12 (Twelve) Hours.               PROGRESS, DATA ANALYSIS, CONSULTS, AND MEDICAL DECISION MAKING  ORDERS PLACED DURING THIS VISIT:  Orders Placed This Encounter   Procedures    Blood Culture - Blood,    Blood Culture - Blood,    CANDIDA AURIS SCREEN - Swab, Axilla Right, Axilla Left and Groin    XR Chest 1 View    CT Head Without Contrast    CT Abdomen Pelvis Without Contrast    Comprehensive Metabolic Panel    Urinalysis With Microscopic If Indicated (No Culture) - Urine, Catheter    Lactic Acid, Plasma    Procalcitonin    Magnesium    CBC Auto Differential    Manual Differential    Urinalysis, Microscopic Only - Urine, Clean Catch    Urinalysis With Culture If Indicated -    Basic Metabolic Panel    CBC (No Diff)    Diet: Regular/House; Fluid Consistency: Thin (IDDSI 0)    Vital Signs    Intake & Output    Weigh Patient    Oral Care    Saline Lock & Maintain IV Access    Place Sequential Compression Device    Maintain Sequential Compression Device    Code Status and Medical Interventions:    [facility] (on-call MD unless specified) Details    Inpatient Nephrology Consult    Insert Peripheral IV    Inpatient Admission    CBC & Differential       All labs have been independently interpreted by me.  All radiology studies have been reviewed by me. All EKG's have been independently viewed and interpreted by me.  Discussion below represents my analysis of pertinent findings related to patient's condition, differential diagnosis, treatment plan and final disposition.    Differential diagnosis includes but is not limited to:   My differential diagnosis for generalized weakness includes but is not limited to:  Neuromuscular weakness   CVA  Hemorrhagic stroke  Multiple sclerosis  Amyotrophic Lateral Sclerosis (ALS) (UMN & LMN)  Spinal and bulbar muscular  atrophy (Zaid's syndrome)  Spinal cord disease: Infection (Epidural abscess)  Infarction/ischemia  Trauma (Spinal Cord Syndromes)  Inflammation (Transverse Myelitis)  Degenerative (Spinal muscular atrophy)  Tumor  Peripheral nerve disease: Guillain-[place] syndrome  Toxins ([place])  Tick paralysis  Diabetic peripheral neuropathy  NMJ disease: Myasthenia gravis crisis  Botulism  Organophosphate toxicity  Lambert-Eaton myasthenic syndrome  Rhabdomyolysis  Dermatomyositis  Polymyositis  Alcoholic myopathy  Non-neuromuscular weakness   ACS  Arrhythmia/Syncope  Severe infection/Sepsis  Hypoglycemia  Periodic paralysis (electrolyte disturbance, K, Mg, Ca)   Hypokalemic periodic paralysis  Thyrotoxic periodic paralysis  Respiratory failure  Symptomatic Anemia  Severe dehydration  Hypothyroidism  Polypharmacy  Malignancy          ED Course:  ED Course as of 06/18/24 0412   Mon Jun 17, 2024   1694 I discussed the case with Dr. Crowley and they agree to evaluate the patient at the bedside.    [CC]   Tue Jun 18, 2024   0043 XR Chest 1 View  My independent interpretation of the imaging study is patchy airspace disease bilaterally [TR]   0105 Creatinine(!): 3.75 [TR]   0105 Hemoglobin(!): 10.5 [TR]   0113 Bacteria, UA(!): 4+ [TR]   0113 WBC, UA(!): Too Numerous to Count [TR]   0114 Lactate: 2.0 [TR]   0116 Procalcitonin(!): 21.30 [CC]   0119 I rechecked the patient.  I discussed the patient's labs, radiology findings (including all incidental findings), diagnosis, and plan for admission. The patient understands and agrees with the plan.   [CC]   0230 Spoke with JAMES Lee with [facility].  Reviewed history, exam, results, treatments.  She agrees admit the patient to Dr. Samuels.  CT pending at the time of admission    [CC]   0312 CT Head Without Contrast  My independent interpretation of the CT of the head is no acute intracranial hemorrhage [CC]   0312 CT Abdomen Pelvis Without Contrast  My independent interpretation of the CT  of the abdomen pelvis is largely distended bladder.  Will place Mariscal catheter [CC]      ED Course User Index  [CC] Kailey Verduzco PA-C  [TR] Juan Crowley MD           AS OF 04:12 EDT VITALS:    BP - 129/58  HR - 98  TEMP - 98.6 °F (37 °C) (Oral)  O2 SATS - 99%    MDM:  Patient is an ill 79-year-old male presents emergency department today with failure to thrive.  On arrival here in the emergency department vitals are reassuring, he is afebrile.  On my exam the patient is cachectic and ill-appearing.  He was evaluated with labs which revealed acute renal failure and urinary tract infection.  He had a procalcitonin of 21 I do suspect this is secondary to his active infection.  Patient's most recent UTI was ESBL, will treat with ertapenem.  He was given 500 cc of IV fluids here in the ED and was found to have urinary retention on CT and Mariscal catheter was placed and he had greater than 1200 output.  Patient will require admission to the hospital for further management of his acute illness.  He is stable at time of admission.      COMPLEXITY OF CARE  The patient requires admission.          DIAGNOSIS  Final diagnoses:   Acute renal failure, unspecified acute renal failure type   Chronic anemia   Hyponatremia   Urinary tract infection associated with indwelling urethral catheter, initial encounter         DISPOSITION  ED Disposition       ED Disposition   Decision to Admit    Condition   --    Comment   Level of Care: Telemetry [5]   Diagnosis: Renal failure [870507]   Admitting Physician: SEBASTIEN GUZMAN [[id number]]   Attending Physician: SEBASTIEN GUZMAN [[id number]]   Certification: I Certify That Inpatient Hospital Services Are Medically Necessary For Greater Than 2 Midnights                        Please note that portions of this document were completed with a voice recognition program.    Note Disclaimer: At [facility], we believe that sharing information builds trust and better relationships. You are  receiving this note because you recently visited [facility]. It is possible you will see health information before a provider has talked with you about it. This kind of information can be easy to misunderstand. To help you fully understand what it means for your health, we urge you to discuss this note with your provider.     Kailey Verduzco PA-C  06/18/24 0417      Electronically signed by Juan Crowley MD at 06/18/24 0421       Yamilet Benitez, RN at 06/17/24 2318          Patient presents to ED from [facility]. Nurse called EMS for patient reporting pain all over as well as abnormal speech. Nurse had never had this patient before so she was unsure if this speech was normal or not.     Electronically signed by Yamilet Benitez, RN at 06/17/24 2319       Medication Administration Report for Anthony Gallegos as of 6/18/24 through 6/19/24     Legend:    Given Hold Not Given Due Canceled Entry Other Actions    Time Time (Time) Time Time-Action         Discontinued     Completed     Future     MAR Hold     Linked             Medications 06/18/24 06/19/24      acetaminophen (TYLENOL) tablet 650 mg  Dose: 650 mg  Freq: Every 4 Hours PRN Route: PO  PRN Reason: Mild Pain  Start: 06/18/24 0226     Admin Instructions:   If given for fever, use fever parameter: fever greater than 100.4 °F  Based on patient request - if ordered for moderate or severe pain, provider allows for administration of a medication prescribed for a lower pain scale.    Do not exceed 4 grams of acetaminophen in a 24 hr period. Max dose of 2gm for AST/ALT greater than 120 units/L.    If given for pain, use the following pain scale:   Mild Pain = Pain Score of 1-3, CPOT 1-2  Moderate Pain = Pain Score of 4-6, CPOT 3-4  Severe Pain = Pain Score of 7-10, CPOT 5-8          Or   acetaminophen (TYLENOL) 160 MG/5ML oral solution 650 mg  Dose: 650 mg  Freq: Every 4 Hours PRN Route: PO  PRN Reason: Mild Pain  Start: 06/18/24 0226      Admin Instructions:   If given for fever, use fever parameter: fever greater than 100.4 °F  Based on patient request - if ordered for moderate or severe pain, provider allows for administration of a medication prescribed for a lower pain scale.    Do not exceed 4 grams of acetaminophen in a 24 hr period. Max dose of 2gm for AST/ALT greater than 120 units/L.    If given for pain, use the following pain scale:   Mild Pain = Pain Score of 1-3, CPOT 1-2  Moderate Pain = Pain Score of 4-6, CPOT 3-4  Severe Pain = Pain Score of 7-10, CPOT 5-8          Or   acetaminophen (TYLENOL) suppository 650 mg  Dose: 650 mg  Freq: Every 4 Hours PRN Route: RE  PRN Reason: Mild Pain  Start: 06/18/24 0226     Admin Instructions:   If given for fever, use fever parameter: fever greater than 100.4 °F  Based on patient request - if ordered for moderate or severe pain, provider allows for administration of a medication prescribed for a lower pain scale.    Do not exceed 4 grams of acetaminophen in a 24 hr period. Max dose of 2gm for AST/ALT greater than 120 units/L.    If given for pain, use the following pain scale:   Mild Pain = Pain Score of 1-3, CPOT 1-2  Moderate Pain = Pain Score of 4-6, CPOT 3-4  Severe Pain = Pain Score of 7-10, CPOT 5-8           sennosides-docusate (PERICOLACE) 8.6-50 MG per tablet 2 tablet  Dose: 2 tablet  Freq: 2 Times Daily PRN Route: PO  PRN Reason: Constipation  Start: 06/18/24 0226     Admin Instructions:   Start bowel management regimen if patient has not had a bowel movement after 12 hours.          And   polyethylene glycol (MIRALAX) packet 17 g  Dose: 17 g  Freq: Daily PRN Route: PO  PRN Reason: Constipation  PRN Comment: Use if senna-docusate is ineffective  Start: 06/18/24 0226     Admin Instructions:   Use if no bowel movement after 12 hours. Mix in 6-8 ounces of water.  Use 4-8 ounces of water, tea, or juice for each 17 gram dose.          And   bisacodyl (DULCOLAX) EC tablet 5 mg  Dose: 5  mg  Freq: Daily PRN Route: PO  PRN Reason: Constipation  PRN Comment: Use if polyethylene glycol is ineffective  Start: 06/18/24 0226     Admin Instructions:   Use if no bowel movement after 12 hours.  Swallow whole. Do not crush, split, or chew tablet.          And   bisacodyl (DULCOLAX) suppository 10 mg  Dose: 10 mg  Freq: Daily PRN Route: RE  PRN Reason: Constipation  PRN Comment: Use if bisacodyl oral is ineffective  Start: 06/18/24 0226     Admin Instructions:   Use if no bowel movement after 12 hours.  Hold for diarrhea          calcium carbonate (TUMS) chewable tablet 500 mg (200 mg elemental)  Dose: 2 tablet  Freq: 2 Times Daily PRN Route: PO  PRN Reason: Heartburn  Start: 06/18/24 0226     Admin Instructions:   One tablet contains 200 mg elemental calcium.  Take with food.          HYDROcodone-acetaminophen (NORCO) 5-325 MG per tablet 1 tablet  Dose: 1 tablet  Freq: Every 6 Hours PRN Route: PO  PRN Reasons: Moderate Pain,Mild Pain  Start: 06/18/24 0239     Admin Instructions:   Based on patient request - if ordered for moderate or severe pain, provider allows for administration of a medication prescribed for a lower pain scale.  [MARION]    Do not exceed 4 grams of acetaminophen in a 24 hr period. Max dose of 2gm for AST/ALT greater than 120 units/L        If given for pain, use the following pain scale:   Mild Pain = Pain Score of 1-3, CPOT 1-2  Moderate Pain = Pain Score of 4-6, CPOT 3-4  Severe Pain = Pain Score of 7-10, CPOT 5-8          lansoprazole (PREVACID SOLUTAB) disintegrating tablet Tablet Delayed Release Dispersible 30 mg  Dose: 30 mg  Freq: Every Early Morning Route: PER G TUBE  Start: 06/19/24 0600     Admin Instructions:   For tube administration, place 30 mg tablet in syringe, draw up 10 mL water, & mix (do not crush).Or place 15 mg tablet in syringe, draw up 4 mL water, and mix (do not crush).** Flush tube with 10 mL **       0500-Given               ondansetron ODT (ZOFRAN-ODT)  disintegrating tablet 4 mg  Dose: 4 mg  Freq: Every 6 Hours PRN Route: PO  PRN Reasons: Nausea,Vomiting  Start: 06/18/24 0226     Admin Instructions:   If BOTH ondansetron (ZOFRAN) and promethazine (PHENERGAN) are ordered use ondansetron first and THEN promethazine IF ondansetron is ineffective.  Place on tongue and allow to dissolve.          Or   ondansetron (ZOFRAN) injection 4 mg  Dose: 4 mg  Freq: Every 6 Hours PRN Route: IV  PRN Reasons: Nausea,Vomiting  Start: 06/18/24 0226     Admin Instructions:   If BOTH ondansetron (ZOFRAN) and promethazine (PHENERGAN) are ordered use ondansetron first and THEN promethazine IF ondansetron is ineffective.          sodium chloride 0.9 % flush 10 mL  Dose: 10 mL  Freq: As Needed Route: IV  PRN Reason: Line Care  Start: 06/18/24 0226          sodium chloride 0.9 % flush 10 mL  Dose: 10 mL  Freq: Every 12 Hours Scheduled Route: IV  Start: 06/18/24 0247      0330-Given     1129-Canceled Entry     2036-Given          (1233)-Not Given     2100             sodium chloride 0.9 % infusion  Rate: 100 mL/hr Dose: 100 mL/hr  Freq: Continuous Route: IV  Start: 06/18/24 0247      0330-New Bag     0611-Currently Infusing     0844-New Bag          0459-New Bag              sodium chloride 0.9 % infusion 40 mL  Dose: 40 mL  Freq: As Needed Route: IV  PRN Reason: Line Care  Start: 06/18/24 0226     Admin Instructions:   Following administration of an IV intermittent medication, flush line with 40mL NS at 100mL/hr.          terazosin (HYTRIN) capsule 1 mg  Dose: 1 mg  Freq: Nightly Route: PER G TUBE  Start: 06/18/24 2100     Admin Instructions:   Hold for SBP less than 100, DBP less than 60.      (2033)-Not Given            2100              Future Medications  Medications 06/18/24 06/19/24      ertapenem (INVanz) 1,000 mg in sodium chloride 0.9 % 100 mL MBP  Dose: 1,000 mg  Freq: Every 24 Hours Route: IV  Indications of Use: URINARY TRACT INFECTION  Start: 06/20/24 1215 End: 06/25/24 1214      Admin Instructions:   Dose adjusted for improved renal function  Caution: Look alike/sound alike drug alert.          Completed Medications  Medications 06/18/24 06/19/24      barium sulfate (VARIBAR THIN LIQUID) oral suspension 55 mL  Dose: 55 mL  Freq: Once in Imaging Route: PO  Start: 06/19/24 1230 End: 06/19/24 1140     Admin Instructions:    Shake well before administration.       1140-Given              barium sulfate oral suspension 4 mL  Dose: 4 mL  Freq: Once in Imaging Route: PO  Start: 06/19/24 1230 End: 06/19/24 1140     Admin Instructions:    Mix with water according to package insert.  Shake well before administration.       1140-Given              barium sulfate oral suspension 50 mL  Dose: 50 mL  Freq: Once in Imaging Route: PO  Start: 06/19/24 1230 End: 06/19/24 1140     Admin Instructions:    Shake well before administration.       1140-Given              barium sulfate oral suspension 50 mL  Dose: 50 mL  Freq: Once in Imaging Route: PO  Start: 06/19/24 1230 End: 06/19/24 1140     Admin Instructions:    Shake well before administration.       1140-Given              cefTRIAXone (ROCEPHIN) 1,000 mg in sodium chloride 0.9 % 100 mL MBP  Dose: 1,000 mg  Freq: Once Route: IV  Indications of Use: URINARY TRACT INFECTION  Start: 06/18/24 0135 End: 06/18/24 0247     Admin Instructions:   LR should be paused and flushing of the line with NS is recommended prior to and after completion of ceftriaxone infusion due to incompatibility. Do not co-adminster with calcium-containing solutions.  Caution: Look alike/sound alike drug alert      0210-New Bag     0247-Stopped              ertapenem (INVanz) 1,000 mg in sodium chloride 0.9 % 100 mL MBP  Dose: 1,000 mg  Freq: Once Route: IV  Indications of Use: URINARY TRACT INFECTION  Start: 06/18/24 0232 End: 06/18/24 0322     Admin Instructions:   Caution: Look alike/sound alike drug alert.      0251-New Bag     0322-Stopped              sodium chloride 0.9 % bolus  500 mL  Dose: 500 mL  Freq: Once Route: IV  Start: 06/18/24 0112 End: 06/18/24 0322      0215-New Bag     0322-Stopped              Discontinued Medications  Medications 06/18/24 06/19/24      acetaminophen (TYLENOL) tablet 650 mg  Dose: 650 mg  Freq: Every 4 Hours PRN Route: PO  PRN Reason: Mild Pain  Start: 06/18/24 1733 End: 06/18/24 1734     Admin Instructions:   If given for fever, use fever parameter: fever greater than 100.4 °F  Based on patient request - if ordered for moderate or severe pain, provider allows for administration of a medication prescribed for a lower pain scale.    Do not exceed 4 grams of acetaminophen in a 24 hr period. Max dose of 2gm for AST/ALT greater than 120 units/L.    If given for pain, use the following pain scale:   Mild Pain = Pain Score of 1-3, CPOT 1-2  Moderate Pain = Pain Score of 4-6, CPOT 3-4  Severe Pain = Pain Score of 7-10, CPOT 5-8          bisacodyl (DULCOLAX) EC tablet 5 mg  Dose: 5 mg  Freq: Daily PRN Route: PO  PRN Reason: Constipation  PRN Comment: Use if polyethylene glycol is ineffective  Start: 06/18/24 1733 End: 06/18/24 1735     Admin Instructions:   Do not crush or chew the capsules or tablets. The drug may not work as designed if the capsule or tablet is crushed or chewed. Swallow whole.  Swallow whole. Do not crush, split, or chew tablet.          ertapenem (INVanz) 1,000 mg in sodium chloride 0.9 % 100 mL MBP  Dose: 1,000 mg  Freq: Every 24 Hours Route: IV  Indications of Use: URINARY TRACT INFECTION  Start: 06/19/24 0230 End: 06/18/24 1119     Admin Instructions:   Caution: Look alike/sound alike drug alert.          ertapenem (INVanz) 500 mg in sodium chloride 0.9 % 50 mL IVPB  Dose: 500 mg  Freq: Every 24 Hours Route: IV  Indications of Use: URINARY TRACT INFECTION  Start: 06/18/24 1215 End: 06/19/24 1306     Admin Instructions:   Caution: Look alike/sound alike drug alert.      1416-New Bag            1243-New Bag                             Consult  Notes (last 48 hours)        Shaylee Rodriguez APRN at 24 1203        Consult Orders    1. Inpatient Urology Consult [[id number]] ordered by Conor Balbuena MD at 24 08                        [facility]      Patient Identification:  NAME:  Anthony Gallegos  Age:  79 y.o.   Sex:  male   :  1945   MRN:  [id number]     Chief complaint: Generalized pain    History of present illness:      Pt is a 79 y.o. male with a history of BPH with LUTS, urinary retention, hydronephrosis, MARTITA, gross hematuria and urinary tract infections that presented to the ED on 24 from [facility] with complaints of generalized pain and abnormal speech. Pt was diagnosed and admitted with MARTITA, UTI and urinary retention. CT with findings of bladder distention. An indwelling catheter was placed with > 1200 cc of urine output. Urology was consulted for evaluation and treatment of bladder distention and moderate hydronephrosis. Patient currently sees Dr. Jimenez with [facility]. Is scheduled for an outpatient cystoscopy with Dr. Pinto on 7/15/24. Patient has a history of prior ESBL urinary tract infections. Receiving Invanz.     In hospital:  -AVSS, good UOP  -WBC - 5.17  -Creat - 3.57 (3.75)- 0.83 in April  -UA - Large LE, negative nitrites, 3+ blood, TNTC WBC, 4+ bacteria, 3-6 SE cells  -UCx - In process  -Blood culture - In process    -CT - Mild-moderate bilateral hydroureteronephrosis, no urolithiasis seen, significant urinary bladder distended, bladder diverticulum     Asked to see    Past medical history:  Past Medical History:   Diagnosis Date    Abscess of scrotum     MARTITA (acute kidney injury)     Altered mental state     Arthritis     AS (ankylosing spondylitis)     History of MRSA infection     Hypertension     Leukocytosis     Tetrahydrocannabinol (THC) use disorder, mild, abuse 2023    Uveitis        Past surgical history:  Past Surgical History:   Procedure Laterality Date     COLONOSCOPY      ENDOSCOPY W/ PEG TUBE PLACEMENT N/A 3/29/2024    Procedure: ESOPHAGOGASTRODUODENOSCOPY WITH PERCUTANEOUS ENDOSCOPIC GASTROSTOMY TUBE INSERTION;  Surgeon: Khadar Broderikc MD;  Location: [facility] ENDOSCOPY;  Service: General;  Laterality: N/A;  PRE/POST - DYSPHAGIA    ROTATOR CUFF REPAIR Right     TOTAL HIP ARTHROPLASTY Left 2014       Allergies:  Patient has no known allergies.    Home medications:  Medications Prior to Admission   Medication Sig Dispense Refill Last Dose    lansoprazole (PREVACID SOLUTAB) 30 MG Tablet Delayed Release Dispersible disintegrating tablet Administer 1 tablet per G tube Every Morning.   6/17/2024    melatonin 3 MG tablet Take 1 tablet by mouth At Night As Needed for Sleep.   6/17/2024    methotrexate 2.5 MG tablet Take 1 tablet by mouth 1 (One) Time Per Week. Take 2.5 mg on Wednesday and 2.5 mg on Thursday.  Do not take any medication on Friday through Tuesday.   Past Week    nitrofurantoin, macrocrystal-monohydrate, (MACROBID) 100 MG capsule Take 1 capsule by mouth 2 (Two) Times a Day.   6/17/2024    Omega-3 Fatty Acids (OMEGA 3 PO) Take 1 capsule by mouth Daily.   6/17/2024    polyethylene glycol 17 g packet 1 packet daily per tube   6/17/2024    potassium chloride (KAYCIEL) 20 mEq/15 mL solution Take 15 mL by mouth Daily.   6/17/2024    sennosides-docusate (PERICOLACE) 8.6-50 MG per tablet Administer 2 tablets per G tube Daily.   6/17/2024    terazosin (HYTRIN) 1 MG capsule Administer 1 capsule per G tube Every Night.   6/17/2024    acetaminophen (TYLENOL) 325 MG tablet Take 2 tablets by mouth Every 4 (Four) Hours As Needed for Mild Pain.       bisacodyl (DULCOLAX) 5 MG EC tablet Take 1 tablet by mouth Daily As Needed for Constipation (Use if polyethylene glycol is ineffective).       Menthol-Zinc Oxide 0.44-20.6 % ointment Apply 1 Application topically to the appropriate area as directed Every 12 (Twelve) Hours.           Hospital medications:  ertapenem, 500  mg, Intravenous, Q24H  sodium chloride, 10 mL, Intravenous, Q12H      sodium chloride, 100 mL/hr, Last Rate: 100 mL/hr (24 0844)        acetaminophen **OR** acetaminophen **OR** acetaminophen    senna-docusate sodium **AND** polyethylene glycol **AND** bisacodyl **AND** bisacodyl    calcium carbonate    HYDROcodone-acetaminophen    ondansetron ODT **OR** ondansetron    sodium chloride    sodium chloride    Family history:  Family History   Problem Relation Age of Onset    Alzheimer's disease Mother        Social history:  Social History     Tobacco Use    Smoking status: Never    Smokeless tobacco: Never   Vaping Use    Vaping status: Never Used   Substance Use Topics    Alcohol use: No    Drug use: No       Review of systems:      12 point negative except as in HPI    Objective:  TMax 24 hours:   Temp (24hrs), Av.1 °F (36.7 °C), Min:97.5 °F (36.4 °C), Max:98.6 °F (37 °C)      Vitals Ranges:   Temp:  [97.5 °F (36.4 °C)-98.6 °F (37 °C)] 97.5 °F (36.4 °C)  Heart Rate:  [] 99  Resp:  [18-22] 18  BP: (104-129)/(57-80) 125/70    Intake/Output Last 3 shifts:  I/O last 3 completed shifts:  In: 600 [IV Piggyback:600]  Out: -      Physical Exam:    General Appearance:    Resting, NAD, chronically ill-appearing   :      Mariscal catheter in place draining orange urine with sediment   Neuro/Psych:   Not assessed       Results review:   I reviewed the patient's new clinical results.    Data review:  Lab Results (last 24 hours)       Procedure Component Value Units Date/Time    Sodium, Urine, Random - Indwelling Urethral Catheter [[id number]] Collected: 24 1018    Specimen: Urine from Indwelling Urethral Catheter Updated: 24 1141     Sodium, Urine 44 mmol/L     Narrative:      Reference intervals for random urine have not been established.  Clinical usage is dependent upon physician's interpretation in combination with other laboratory tests.       Chloride, Urine, Random - Indwelling Urethral Catheter  [[id number]] Collected: 06/18/24 1018    Specimen: Urine from Indwelling Urethral Catheter Updated: 06/18/24 1141     Chloride, Urine 33 mmol/L     Narrative:      Reference intervals for random urine have not been established.  Clinical usage is dependent upon physician's interpretation in combination with other laboratory tests.       NEHEMIAH AURIS SCREEN - Swab, Axilla Right, Axilla Left and Groin [[id number]] Collected: 06/18/24 1018    Specimen: Swab from Axilla Right, Axilla Left and Groin Updated: 06/18/24 1040    Uric Acid [[id number]]  (Abnormal) Collected: 06/18/24 0711    Specimen: Blood Updated: 06/18/24 0910     Uric Acid 9.1 mg/dL     Basic Metabolic Panel [[id number]]  (Abnormal) Collected: 06/18/24 0711    Specimen: Blood Updated: 06/18/24 0746     Glucose 94 mg/dL      BUN 89 mg/dL      Creatinine 3.57 mg/dL      Sodium 134 mmol/L      Potassium 4.9 mmol/L      Comment: Slight hemolysis detected by analyzer. Result may be falsely elevated.        Chloride 99 mmol/L      CO2 20.5 mmol/L      Calcium 8.5 mg/dL      BUN/Creatinine Ratio 24.9     Anion Gap 14.5 mmol/L      eGFR 16.5 mL/min/1.73     Narrative:      GFR Normal >60  Chronic Kidney Disease <60  Kidney Failure <15    The GFR formula is only valid for adults with stable renal function between ages 18 and 70.    CBC (No Diff) [[id number]]  (Abnormal) Collected: 06/18/24 0711    Specimen: Blood Updated: 06/18/24 0733     WBC 5.17 10*3/mm3      RBC 3.82 10*6/mm3      Hemoglobin 10.4 g/dL      Hematocrit 31.2 %      MCV 81.7 fL      MCH 27.2 pg      MCHC 33.3 g/dL      RDW 15.3 %      RDW-SD 45.2 fl      MPV 8.6 fL      Platelets 248 10*3/mm3     Urine Culture - Urine, Straight Cath [[id number]] Collected: 06/18/24 0034    Specimen: Urine from Straight Cath Updated: 06/18/24 0630    Manual Differential [[id number]]  (Abnormal) Collected: 06/18/24 0033    Specimen: Blood Updated: 06/18/24 0350     Neutrophil % 73.2 %      Lymphocyte % 14.4 %       "Monocyte % 10.3 %      Eosinophil % 1.0 %      Basophil % 0.0 %      Atypical Lymphocyte % 1.0 %      Neutrophils Absolute 4.36 10*3/mm3      Lymphocytes Absolute 0.92 10*3/mm3      Monocytes Absolute 0.61 10*3/mm3      Eosinophils Absolute 0.06 10*3/mm3      Basophils Absolute 0.00 10*3/mm3      Anisocytosis Slight/1+     Macrocytes Slight/1+     Poikilocytes Slight/1+     Polychromasia Slight/1+     WBC Morphology Normal     Platelet Morphology Normal    Blood Culture - Blood, Arm, Right [[id number]] Collected: 06/18/24 0200    Specimen: Blood from Arm, Right Updated: 06/18/24 0207    Blood Culture - Blood, Arm, Left [[id number]] Collected: 06/18/24 0200    Specimen: Blood from Arm, Left Updated: 06/18/24 0206    Urinalysis, Microscopic Only - Straight Cath [[id number]]  (Abnormal) Collected: 06/18/24 0034    Specimen: Urine from Straight Cath Updated: 06/18/24 0113     RBC, UA 6-10 /HPF      WBC, UA Too Numerous to Count /HPF      Bacteria, UA 4+ /HPF      Squamous Epithelial Cells, UA 3-6 /HPF      Hyaline Casts, UA 21-30 /LPF      Methodology Automated Microscopy    Procalcitonin [[id number]]  (Abnormal) Collected: 06/18/24 0033    Specimen: Blood Updated: 06/18/24 0111     Procalcitonin 21.30 ng/mL     Narrative:      As a Marker for Sepsis (Non-Neonates):    1. <0.5 ng/mL represents a low risk of severe sepsis and/or septic shock.  2. >2 ng/mL represents a high risk of severe sepsis and/or septic shock.    As a Marker for Lower Respiratory Tract Infections that require antibiotic therapy:    PCT on Admission    Antibiotic Therapy       6-12 Hrs later    >0.5                Strongly Recommended  >0.25 - <0.5        Recommended   0.1 - 0.25          Discouraged              Remeasure/reassess PCT  <0.1                Strongly Discouraged     Remeasure/reassess PCT    As 28 day mortality risk marker: \"Change in Procalcitonin Result\" (>80% or <=80%) if Day 0 (or Day 1) and Day 4 values are available. Refer to " http://www.[facility]-pct-calculator.com    Change in PCT <=80%  A decrease of PCT levels below or equal to 80% defines a positive change in PCT test result representing a higher risk for 28-day all-cause mortality of patients diagnosed with severe sepsis for septic shock.    Change in PCT >80%  A decrease of PCT levels of more than 80% defines a negative change in PCT result representing a lower risk for 28-day all-cause mortality of patients diagnosed with severe sepsis or septic shock.       Urinalysis With Microscopic If Indicated (No Culture) - Straight Cath [[id number]]  (Abnormal) Collected: 06/18/24 0034    Specimen: Urine from Straight Cath Updated: 06/18/24 0105     Color, UA Dark Yellow     Appearance, UA Turbid     pH, UA 5.5     Specific Gravity, UA 1.015     Glucose, UA Negative     Ketones, UA Trace     Bilirubin, UA Negative     Blood, UA Large (3+)     Protein,  mg/dL (2+)     Leuk Esterase, UA Large (3+)     Nitrite, UA Negative     Urobilinogen, UA 0.2 E.U./dL    Comprehensive Metabolic Panel [[id number]]  (Abnormal) Collected: 06/18/24 0033    Specimen: Blood Updated: 06/18/24 0104     Glucose 111 mg/dL       mg/dL      Creatinine 3.75 mg/dL      Sodium 130 mmol/L      Potassium 5.7 mmol/L      Chloride 96 mmol/L      CO2 20.5 mmol/L      Calcium 9.1 mg/dL      Total Protein 8.3 g/dL      Albumin 2.9 g/dL      ALT (SGPT) 25 U/L      AST (SGOT) 34 U/L      Alkaline Phosphatase 78 U/L      Total Bilirubin 0.6 mg/dL      Globulin 5.4 gm/dL      A/G Ratio 0.5 g/dL      BUN/Creatinine Ratio 27.5     Anion Gap 13.5 mmol/L      eGFR 15.6 mL/min/1.73     Narrative:      GFR Normal >60  Chronic Kidney Disease <60  Kidney Failure <15    The GFR formula is only valid for adults with stable renal function between ages 18 and 70.    Magnesium [[id number]]  (Abnormal) Collected: 06/18/24 0033    Specimen: Blood Updated: 06/18/24 0104     Magnesium 2.5 mg/dL     CBC & Differential [[id number]]  (Abnormal)  Collected: 06/18/24 0033    Specimen: Blood Updated: 06/18/24 0104    Narrative:      The following orders were created for panel order CBC & Differential.  Procedure                               Abnormality         Status                     ---------                               -----------         ------                     CBC Auto Differential[[id number]]        Abnormal            Final result                 Please view results for these tests on the individual orders.    CBC Auto Differential [[id number]]  (Abnormal) Collected: 06/18/24 0033    Specimen: Blood Updated: 06/18/24 0104     WBC 5.95 10*3/mm3      RBC 3.86 10*6/mm3      Hemoglobin 10.5 g/dL      Hematocrit 31.7 %      MCV 82.1 fL      MCH 27.2 pg      MCHC 33.1 g/dL      RDW 15.7 %      RDW-SD 46.7 fl      MPV 8.3 fL      Platelets 281 10*3/mm3      nRBC 0.0 /100 WBC     Lactic Acid, Plasma [[id number]]  (Normal) Collected: 06/18/24 0033    Specimen: Blood Updated: 06/18/24 0101     Lactate 2.0 mmol/L              Imaging:  Imaging Results (Last 24 Hours)       Procedure Component Value Units Date/Time    CT Abdomen Pelvis Without Contrast [[id number]] Collected: 06/18/24 0406     Updated: 06/18/24 0406    Narrative:        Patient: JADE MARAVILLA  Time Out: 04:06  Exam(s): CT ABDOMEN + PELVIS Without Contrast     EXAM:    CT Abdomen and Pelvis Without Intravenous Contrast    CLINICAL HISTORY:     Reason for exam: abdominal pain, renal failure.    TECHNIQUE:    Axial computed tomography images of the abdomen and pelvis without   intravenous contrast.  CTDI is 15.69 mGy and DLP is 827.7 mGy-cm.  This   CT exam was performed according to the principle of ALARA (As Low As   Reasonably Achievable) by using one or more of the following dose   reduction techniques: automated exposure control, adjustment of the mA   and or kV according to patient size, and or use of iterative   reconstruction technique.    COMPARISON:    March 25, 2024 scattered  fibrotic changes in the lung bases.    FINDINGS:    Lung bases:  Unremarkable.  No mass.  No consolidation.     ABDOMEN:    Liver:  Unremarkable.    Gallbladder and bile ducts:  Unremarkable.  No calcified stones.  No   ductal dilation.    Pancreas:  Unremarkable.  No ductal dilation.    Spleen:  Unremarkable.  No splenomegaly.    Adrenals:  Unremarkable.  No mass.    Kidneys and ureters:  Mild to moderate bilateral hydronephrosis and   hydroureter.  No ureterolithiasis is seen.  Likely urinary retention.  5.  3 cm simple cyst in the lower pole the right kidney.  No follow-up is   required.    Stomach and bowel:  Gaseous distention of the transverse colon.    Possible mild colonic ileus.  The sigmoid colon is partially compressed   by the enlarged urinary bladder.  No mucosal thickening.     PELVIS:    Appendix:  No findings to suggest acute appendicitis.    Bladder:  There is dilation of the urinary bladder measuring 20 cm   craniocaudad.  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    Reproductive:  Unremarkable as visualized.     ABDOMEN and PELVIS:    Intraperitoneal space:  Unremarkable.  No free air.  No significant   fluid collection.    Bones joints:  There is metallic artifact from a left hip arthroplasty.    No acute fracture or dislocation is seen.  Ankylosis of the spine.  No   acute fracture is seen.    Soft tissues:  Unremarkable.    Vasculature:  Unremarkable.  No abdominal aortic aneurysm.    Lymph nodes:  Unremarkable.  No enlarged lymph nodes.    Tubes, lines and devices:  There is a PEG tube in the stomach.  The   stomach is decompressed.    IMPRESSION:       1.  There is dilation of the urinary bladder measuring 20 cm craniocaudad.    There is irregular wall thickening with a 7 cm bladder diverticulum   near the vertex, similar to previous.  Consider chronic bladder outlet   obstruction and urinary retention.  2.  Mild to moderate  bilateral hydronephrosis and hydroureter.  No   ureterolithiasis is seen.  Likely urinary retention.  3.  Gaseous distention of the transverse colon.  Possible mild colonic   ileus.  The sigmoid colon is partially compressed by the enlarged urinary   bladder.      Impression:          Electronically signed by Haider Kuhn MD on 06-18-24 at 0406    CT Head Without Contrast [[id number]] Collected: 06/18/24 0302     Updated: 06/18/24 0302    Narrative:        Patient: JADE MARAVILLA  Time Out: 03:01  Exam(s): CT HEAD Without Contrast     EXAM:    CT Head Without Intravenous Contrast    CLINICAL HISTORY:     Reason for exam: AMS.    TECHNIQUE:    Axial computed tomography images of the head brain without intravenous   contrast.  CTDI is 55.7 mGy and DLP is 1018.9 mGy-cm.  This CT exam was   performed according to the principle of ALARA (As Low As Reasonably   Achievable) by using one or more of the following dose reduction   techniques: automated exposure control, adjustment of the mA and or kV   according to patient size, and or use of iterative reconstruction   technique.    COMPARISON:    3.29.24    FINDINGS:    Brain:  No hemorrhage, herniation, or mass effect.  Chronic   microvascular ischemic changes.    Ventricles:  No hydrocephalus.  Age related cerebral volume loss.    Bones joints:  Unremarkable.    Soft tissues:  Unremarkable.    Sinuses:  No air fluid levels.    Mastoid air cells:  Clear.    IMPRESSION:       No acute hemorrhage, hydrocephalus, or mass effect.      Impression:          Electronically signed by Manasa Reina MD on 06-18-24 at 0301    XR Chest 1 View [[id number]] Collected: 06/18/24 0231     Updated: 06/18/24 0231    Narrative:        Patient: JADE MARAVILLA  Time Out: 02:31  Exam(s): XR CXR 1 VIEW     EXAM:    XR Chest, 1 View    CLINICAL HISTORY:     Reason for exam: AMS.    TECHNIQUE:    Frontal view of the chest.    COMPARISON:  3 25 2024    FINDINGS:    See  "Impression.    IMPRESSION:    Prominent interstitial markings are seen.  Correlate with edema or   infection.  No effusion.  Unchanged heart size.    Impression:          Electronically signed by Manasa Reina MD on 24 at 0231               Assessment:     Urinary retention  Urinary tract infection  Bilateral hydronephrosis  MARTITA  BPH with LUTS    Plan:     - No acute urologic surgical intervention planned during admission  - Continue indwelling forde catheter during admission and at discharge. Recommend keeping indwelling forde until seen for outpatient cystoscopy  - Continue IV Invanz- urine and blood cultures pending. Recommend tailoring antibiotic selection based on results of culture data  - Continue Terazosin  - Recommend renal ultrasound in 2-3 days to assess for resolution of hydronephrosis  - Urology will follow remotely. Call for any questions, concerns, or clinical change    NOHEMI Flowers  24  12:03 EDT    Plan reviewed and discussed with Dr. Lopez    Electronically signed by Shaylee Rodriguez APRN at 24 1324       Thompson Novoa MD at 24 0818        Consult Orders    1. 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 male, admitted yesterday for generalized pain and altered mental status.  Patient presented to the ER with a Forde catheter that was not draining appropriately, urine looked like\" chocolate milk\", UA suggestive of UTI.  CT abdomen/pelvis suggested mild to moderate bilateral hydronephrosis and hydroureter with bladder distention; Forde was replaced in the ER with " better urine outflow.  Patient was given IV antibiotics and a 500 cc bolus, currently on maintenance IVF at 100 cc/h.    From 3/25-4/9, patient was hospitalized for UTI and MARTITA (peaked creatinine 2.5, 0.67 upon discharge).  Patient is currently obtunded, only grimaces or moans upon aggressive verbal and physical stimuli, unable to provide any reliable history/information.  Does not appear to be in any acute distress.    Other PMH includes: Hypertension, uveitis, ankylosing spondylitis.    Review of Systems:   Unable to assess    Personal History     Past Medical History:   Diagnosis Date    Abscess of scrotum     MARTITA (acute kidney injury)     Altered mental state     Arthritis     AS (ankylosing spondylitis)     History of MRSA infection     Hypertension     Leukocytosis     Tetrahydrocannabinol (THC) use disorder, mild, abuse 12/20/2023    Uveitis        Past Surgical History:   Procedure Laterality Date    COLONOSCOPY      ENDOSCOPY W/ PEG TUBE PLACEMENT N/A 3/29/2024    Procedure: ESOPHAGOGASTRODUODENOSCOPY WITH PERCUTANEOUS ENDOSCOPIC GASTROSTOMY TUBE INSERTION;  Surgeon: Khadar Broderick MD;  Location: [facility] ENDOSCOPY;  Service: General;  Laterality: N/A;  PRE/POST - DYSPHAGIA    ROTATOR CUFF REPAIR Right     TOTAL HIP ARTHROPLASTY Left 2014       Family History: family history includes Alzheimer's disease in his mother.    Social History:  reports that he has never smoked. He has never used smokeless tobacco. He reports that he does not drink alcohol and does not use drugs.    Home Medications:  Prior to Admission medications    Medication Sig Start Date End Date Taking? Authorizing Provider   lansoprazole (PREVACID SOLUTAB) 30 MG Tablet Delayed Release Dispersible disintegrating tablet Administer 1 tablet per G tube Every Morning. 4/10/24  Yes Shola Betancourt MD   melatonin 3 MG tablet Take 1 tablet by mouth At Night As Needed for Sleep. 1/30/24  Yes Lakesha Sykes APRN    methotrexate 2.5 MG tablet Take 1 tablet by mouth 1 (One) Time Per Week. Take 2.5 mg on Wednesday and 2.5 mg on Thursday.  Do not take any medication on Friday through Tuesday. 4/9/24  Yes Shola Betancourt MD   nitrofurantoin, macrocrystal-monohydrate, (MACROBID) 100 MG capsule Take 1 capsule by mouth 2 (Two) Times a Day.   Yes Elodia Sánchez MD   Omega-3 Fatty Acids (OMEGA 3 PO) Take 1 capsule by mouth Daily.   Yes Elodia Sánchez MD   polyethylene glycol 17 g packet 1 packet daily per tube 4/10/24  Yes Shola Betancourt MD   potassium chloride (KAYCIEL) 20 mEq/15 mL solution Take 15 mL by mouth Daily.   Yes Elodia Sánchez MD   sennosides-docusate (PERICOLACE) 8.6-50 MG per tablet Administer 2 tablets per G tube Daily. 4/10/24  Yes Shola Betancourt MD   terazosin (HYTRIN) 1 MG capsule Administer 1 capsule per G tube Every Night. 4/9/24  Yes Shola Betancourt MD   acetaminophen (TYLENOL) 325 MG tablet Take 2 tablets by mouth Every 4 (Four) Hours As Needed for Mild Pain. 1/30/24   Lakesha Sykes APRN   bisacodyl (DULCOLAX) 5 MG EC tablet Take 1 tablet by mouth Daily As Needed for Constipation (Use if polyethylene glycol is ineffective). 4/9/24   hSola Betancourt MD   Menthol-Zinc Oxide 0.44-20.6 % ointment Apply 1 Application topically to the appropriate area as directed Every 12 (Twelve) Hours. 4/9/24   Shola Betancourt MD       Allergies:  No Known Allergies    Objective     Vitals:   Temp:  [97.5 °F (36.4 °C)-98.6 °F (37 °C)] 97.5 °F (36.4 °C)  Heart Rate:  [] 99  Resp:  [18-22] 18  BP: (104-129)/(57-80) 125/70    Intake/Output Summary (Last 24 hours) at 6/18/2024 1255  Last data filed at 6/18/2024 0723  Gross per 24 hour   Intake 600 ml   Output 1500 ml   Net -900 ml       Physical Exam:   Constitutional: Obtunded, chronically ill-appearing, cachectic, hard of hearing  HEENT: Sclera anicteric, no conjunctival injection, left hearing  aid in place, oral mucosa is dry  Neck: Supple, no JVD  Respiratory: Clear to auscultation bilaterally, breathing effort nonlabored  Cardiovascular: Giller rate and rhythm, no rub  Gastrointestinal: Positive bowel sounds, abdomen is soft, no guarding and nondistended  : No palpable bladder, Mariscal catheter in place with cloudy urine  Musculoskeletal: No edema, no clubbing or cyanosis  Psychiatric: Unable to assess  Neurologic: Unable to assess  Skin: Warm and dry, scattered freckles       Scheduled Meds:     ertapenem, 500 mg, Intravenous, Q24H  sodium chloride, 10 mL, Intravenous, Q12H      IV Meds:   sodium chloride, 100 mL/hr, Last Rate: 100 mL/hr (06/18/24 0844)        Results Reviewed:   I have personally reviewed the results from the time of this admission to 6/18/2024 12:55 EDT     Lab Results   Component Value Date    GLUCOSE 94 06/18/2024    CALCIUM 8.5 (L) 06/18/2024     (L) 06/18/2024    K 4.9 06/18/2024    CO2 20.5 (L) 06/18/2024    CL 99 06/18/2024    BUN 89 (H) 06/18/2024    CREATININE 3.57 (H) 06/18/2024    EGFRIFAFRI 84 03/27/2018    EGFRIFNONA 69 03/27/2018    BCR 24.9 06/18/2024    ANIONGAP 14.5 06/18/2024      Lab Results   Component Value Date    MG 2.5 (H) 06/18/2024    PHOS 3.0 04/06/2024    ALBUMIN 2.9 (L) 06/18/2024           Assessment / Plan       UTI (urinary tract infection), bacterial    Hypertension    Immobility    Generalized pain    DISH (disseminated idiopathic skeletal hyperostosis)    Generalized weakness    Dysphagia    Failure to thrive in adult    ARF (acute renal failure)      ASSESSMENT:  Acute kidney injury secondary to urinary retention nonfunctional Mariscal catheter with bilateral hydronephrosis and UTI, slightly improving since admission.  Creatinine on 4/6/2024 was 0.67  Very mild hyponatremia, sodium 134  Hyperkalemia, secondary to MARTITA and urinary retention resolved potassium down to 4.9  Normal anion gap metabolic acidosis improving with the improvement of the  urinary retention  BPH, Mariscal was replaced yesterday in the ER, on terazosin  Hypoalbuminemia, secondary to poor p.o. solute intake  Hypertension, BP well-controlled  Failure to thrive  Altered mental status    PLAN:  Agree with current treatment  Continue the same treatment  Surveillance labs    I reviewed the chart and other providers notes and reviewed labs.  I discussed the case with the patient and the patient voiced good understanding.    Thank you for involving us in the care of Anthony Gallegos.  Please feel free to call with any questions.    Thompson Novoa MD  06/18/24  12:55 EDT    [facility]  [phone]      Please note that portions of this note were completed with a voice recognition program.    Electronically signed by Thompson Novoa MD at 06/18/24 9930

## 2024-06-19 NOTE — PLAN OF CARE
Goal Outcome Evaluation:  Plan of Care Reviewed With: patient        Progress: no change  Outcome Evaluation: pt is an 80 yo male admitted from LTC with pain and decreased level of alertness. He is seen this date for OT eval, A&O to self, pt unable to provide detailed prior level however reports he does not get out of bed, pressure sores noted throughout entire spine as well as bottom. Per chart, reports noted chronic immobility. He is depx2 for rolling this date, resistive to all movement when assessing BUE ROM. Dep for all ADLs anticipated at facility and current presentation. He presents at his baseline, he is not appropriate for skilled OT services, recommend to return to LTC at d/c. Will sign off.      Anticipated Discharge Disposition (OT): extended care facility

## 2024-06-19 NOTE — PROGRESS NOTES
Discharge Planning Assessment  [facility]     Patient Name: Anthony Gallegos  MRN: [id number]  Today's Date: 6/19/2024    Admit Date: 6/17/2024    Plan: Return to [facility]   Discharge Needs Assessment       Row Name 06/19/24 1009       Living Environment    People in Home facility resident    Current Living Arrangements extended care facility    Potentially Unsafe Housing Conditions none    Primary Care Provided by other (see comments)    Provides Primary Care For no one, unable/limited ability to care for self    Family Caregiver if Needed child(winston), adult    Family Caregiver Names Daughter Whit Dove [phone]    Quality of Family Relationships helpful       Resource/Environmental Concerns    Resource/Environmental Concerns none    Transportation Concerns none       Transition Planning    Patient/Family Anticipates Transition to long-term care facility    Patient/Family Anticipated Services at Transition skilled nursing;rehabilitation services    Transportation Anticipated health plan transportation       Discharge Needs Assessment    Readmission Within the Last 30 Days no previous admission in last 30 days    Equipment Currently Used at Home hospital bed    Concerns to be Addressed discharge planning    Anticipated Changes Related to Illness none    Equipment Needed After Discharge none    Provided Post Acute Provider List? N/A    N/A Provider List Comment Has been at [facility] and plans to return    Provided Post Acute Provider Quality & Resource List? N/A                   Discharge Plan       Row Name 06/19/24 1010       Plan    Plan Return to [facility]    Patient/Family in Agreement with Plan yes    Plan Comments Spoke with willard Dove [phone] by telephone.  Patient is from [facility] and plan is for him to return.  Per Maddie/[facility] - patient is from LT but they would like to get pre-cert at [facility] and bring him back skilled.  Packet  in CCP office.  CCP will follow.  Joann MOORE                  Continued Care and Services - Admitted Since 6/17/2024       Destination       Service Provider Request Status Selected Services Address Phone Fax Patient Preferred    [facility] Accepted N/A [address] [address] [phone] --                  Selected Continued Care - Prior Encounters Includes continued care and service providers with selected services from prior encounters from 3/19/2024 to 6/19/2024      Discharged on 4/9/2024 Admission date: 3/25/2024 - Discharge disposition: Skilled Nursing Facility (DC - External)      Destination       Service Provider Selected Services Address Phone Fax Patient Preferred    [facility] Skilled Nursing [address] [address] [phone] --                          Expected Discharge Date and Time       Expected Discharge Date Expected Discharge Time    Jun 21, 2024            Demographic Summary       Row Name 06/19/24 1008       General Information    Admission Type inpatient    Arrived From subacute/long-term acute care    Referral Source admission list    Reason for Consult discharge planning    Preferred Language English                   Functional Status       Row Name 06/19/24 1008       Functional Status    Usual Activity Tolerance fair    Current Activity Tolerance poor       Functional Status, IADL    Medications completely dependent    Meal Preparation completely dependent    Housekeeping completely dependent    Laundry completely dependent    Shopping completely dependent       Mental Status    General Appearance WDL WDL       Mental Status Summary    Recent Changes in Mental Status/Cognitive Functioning unable to assess                     Becky S. [place], RN

## 2024-06-19 NOTE — PLAN OF CARE
Goal Outcome Evaluation:  Plan of Care Reviewed With: patient              Patient is an 79 y.o. male admitted to [facility] for renal failure, hyponatremia, urinary retention, LUTS, HTN, BPH. Patient is unable to state PLOF at this time but would assume pt is bedbound at baseline due to multiple wounds in place upon arrival. Pt is depx2 for rolling R<>L and pt resistant to all movement. PT rec pt return to LTC facility with proper turning schedule and positioning for pressure relief.     Anticipated Discharge Disposition (PT): extended care facility (PT rec pt return to LTC facility with proper turning schedule and positioning for pressure relief as he had wounds on admission.)

## 2024-06-19 NOTE — PLAN OF CARE
Goal Outcome Evaluation:                   Problem: Malnutrition  Goal: Improved Nutritional Intake  Outcome: Ongoing, Progressing          Plan for video swallow. Will follow for diet advancement/need for nutrition support.

## 2024-06-19 NOTE — THERAPY EVALUATION
Patient Name: Anthony Gallegos  : 1945    MRN: [id number]                              Today's Date: 2024       Admit Date: 2024    Visit Dx:     ICD-10-CM ICD-9-CM   1. Acute renal failure, unspecified acute renal failure type  N17.9 584.9   2. Chronic anemia  D64.9 285.9   3. Hyponatremia  E87.1 276.1   4. Urinary tract infection associated with indwelling urethral catheter, initial encounter  T83.511A 996.64    N39.0 599.0   5. Acute urinary retention  R33.8 788.29     Patient Active Problem List   Diagnosis    Ankylosing spondylitis of cervical region    Recent unexplained weight loss    Abnormal serum lipase level    Abnormal serum level of amylase    Hypertension    Boil    Immobility    Fall from bed    Tetrahydrocannabinol (THC) use disorder, mild, abuse    Generalized pain        Grief    DISH (disseminated idiopathic skeletal hyperostosis)    Generalized weakness    Severe malnutrition    Altered mental status    Transient alteration of awareness    GERD without esophagitis    BPH (benign prostatic hyperplasia)    UTI (urinary tract infection)    Hyperglycemia    Decreased oral intake    Dysphagia    Failure to thrive in adult    Immunosuppression due to drug therapy    Renal failure    UTI (urinary tract infection), bacterial    ARF (acute renal failure)     Past Medical History:   Diagnosis Date    Abscess of scrotum     MARTITA (acute kidney injury)     Altered mental state     Arthritis     AS (ankylosing spondylitis)     History of MRSA infection     Hypertension     Leukocytosis     Tetrahydrocannabinol (THC) use disorder, mild, abuse 2023    Uveitis      Past Surgical History:   Procedure Laterality Date    COLONOSCOPY      ENDOSCOPY W/ PEG TUBE PLACEMENT N/A 3/29/2024    Procedure: ESOPHAGOGASTRODUODENOSCOPY WITH PERCUTANEOUS ENDOSCOPIC GASTROSTOMY TUBE INSERTION;  Surgeon: Khadar Broderick MD;  Location: [facility] ENDOSCOPY;  Service: General;  Laterality: N/A;   PRE/POST - DYSPHAGIA    ROTATOR CUFF REPAIR Right     TOTAL HIP ARTHROPLASTY Left 2014      General Information       Row Name 06/19/24 1531          OT Time and Intention    Document Type discharge evaluation/summary  -MM     Mode of Treatment co-treatment;physical therapy;occupational therapy  -       Row Name 06/19/24 1531          General Information    Patient Profile Reviewed yes  -MM     Prior Level of Function --  pt unable to provide detailed prior level, pt reports he does not get out of bed at facility  -MM     Existing Precautions/Restrictions fall  -MM     Barriers to Rehab medically complex;previous functional deficit  -       Row Name 06/19/24 1531          Living Environment    People in Home facility resident  LTC  -MM       Row Name 06/19/24 1531          Cognition    Orientation Status (Cognition) oriented to;person  speech mumbling throughout  -       Row Name 06/19/24 1531          Safety Issues, Functional Mobility    Comment, Safety Issues/Impairments (Mobility) Co-treatment medically necessary and appropriate d/t pt's acuity level, decreased endurance and activity tolerance, and safety of patient and staff. Treatment focused on progression of care and goals established in POC. Gait belt and non-skid socks worn.  -MM               User Key  (r) = Recorded By, (t) = Taken By, (c) = Cosigned By      Initials Name Provider Type    MM Mindy Morrow OT Occupational Therapist                     Mobility/ADL's       Row Name 06/19/24 1533 06/19/24 1531       Bed Mobility    Bed Mobility rolling left;rolling right  -MM --    Rolling Left [place] (Bed Mobility) dependent (less than 25% patient effort);2 person assist;verbal cues  -MM --    Rolling Right [place] (Bed Mobility) dependent (less than 25% patient effort);2 person assist;verbal cues  -MM --    Assistive Device (Bed Mobility) head of bed elevated  -MM --    Comment, (Bed Mobility) resistive to all activity, pt very rigid   -MM pt very resistive to all activity, very rigid  -MM      Row Name 06/19/24 1533 06/19/24 1531       Transfers    Comment, (Transfers) not approp to assess  -MM not approp to assess  -MM      [facility] Name 06/19/24 1533 06/19/24 1531       Activities of Daily Living    BADL Assessment/Intervention --  dep for all ADLs, PEG, bed level anticipated  -MM --  pt dep for all ADLs, PEG  -MM              User Key  (r) = Recorded By, (t) = Taken By, (c) = Cosigned By      Initials Name Provider Type    MM Mindy Morrow OT Occupational Therapist                   Obj/Interventions       Row Name 06/19/24 1534          Sensory Assessment (Somatosensory)    Sensory Assessment (Somatosensory) unable/difficult to assess  -MM       Row Name 06/19/24 1534          Vision Assessment/Intervention    Visual Impairment/Limitations --  sunglasses donned  -MM       Row Name 06/19/24 1534          Range of Motion Comprehensive    General Range of Motion upper extremity range of motion deficits identified  -MM     Comment, General Range of Motion 50% end range in BUE, able to reach mouth bilaterally, pt resistive to therapist assessing PROM  -MM       [facility] Name 06/19/24 1534          Strength Comprehensive (MMT)    Comment, General Manual Muscle Testing (MMT) Assessment resistive  -MM       Row Name 06/19/24 1534          Motor Skills    Motor Skills functional endurance  -MM     Functional Endurance very poor  -MM               User Key  (r) = Recorded By, (t) = Taken By, (c) = Cosigned By      Initials Name Provider Type    MM Mindy Morrow OT Occupational Therapist                   Goals/Plan       Row Name 06/19/24 1539          Bed Mobility Goal 1 (OT)    Activity/Assistive Device (Bed Mobility Goal 1, OT) rolling to left  -MM     [facility] Level/Cues Needed (Bed Mobility Goal 1, OT) maximum assist (25-49% patient effort)  -MM     Time Frame (Bed Mobility Goal 1, OT) 1 day;short term goal (STG)  -MM     Progress/Outcomes (Bed  Mobility Goal 1, OT) goal met  -MM               User Key  (r) = Recorded By, (t) = Taken By, (c) = Cosigned By      Initials Name Provider Type    MM Mindy Morrow OT Occupational Therapist                   Clinical Impression       Row Name 06/19/24 1535          Pain Assessment    Pretreatment Pain Rating 0/10 - no pain  -MM     Posttreatment Pain Rating 0/10 - no pain  -MM       Row Name 06/19/24 1533          Plan of Care Review    Plan of Care Reviewed With patient  -MM     Progress no change  -MM     Outcome Evaluation pt is an 80 yo male admitted from LTC with pain and decreased level of alertness. He is seen this date for OT eval, A&O to self, pt unable to provide detailed prior level however reports he does not get out of bed, pressure sores noted throughout entire spine as well as bottom. Per chart, reports noted chronic immobility. He is depx2 for rolling this date, resistive to all movement when assessing BUE ROM. Dep for all ADLs anticipated at facility and current presentation. He presents at his baseline, he is not appropriate for skilled OT services, recommend to return to LTC at d/c. Will sign off.  -MM       Row Name 06/19/24 1534          Therapy Assessment/Plan (OT)    Criteria for Skilled Therapeutic Interventions Met (OT) no problems identified which require skilled intervention  -MM     Therapy Frequency (OT) evaluation only  -MM       Row Name 06/19/24 1535          Therapy Plan Review/Discharge Plan (OT)    Anticipated Discharge Disposition (OT) [facility]  -MM       Row Name 06/19/24 1533          Vital Signs    O2 Delivery Pre Treatment room air  -MM       Row Name 06/19/24 1537          Positioning and Restraints    Pre-Treatment Position in bed  -MM     Post Treatment Position bed  -MM     In Bed notified nsg;fowlers;encouraged to call for assist;call light within reach;exit alarm on;side rails up x3  -MM               User Key  (r) = Recorded By, (t) = Taken By, (c) =  Cosigned By      Initials Name Provider Type    Mindy Velasco OT Occupational Therapist                   Outcome Measures       Row Name 06/19/24 1539          How much help from another is currently needed...    Putting on and taking off regular lower body clothing? 1  -MM     Bathing (including washing, rinsing, and drying) 1  -MM     Toileting (which includes using toilet bed pan or urinal) 1  -MM     Putting on and taking off regular upper body clothing 1  -MM     Taking care of personal grooming (such as brushing teeth) 1  -MM     Eating meals 2  -MM     AM-PAC 6 Clicks Score (OT) 7  -MM       Row Name 06/19/24 1534          How much help from another person do you currently need...    Turning from your back to your side while in flat bed without using bedrails? 1  -CB     Moving from lying on back to sitting on the side of a flat bed without bedrails? 1  -CB     Moving to and from a bed to a chair (including a wheelchair)? 1  -CB     Standing up from a chair using your arms (e.g., wheelchair, bedside chair)? 1  -CB     Climbing 3-5 steps with a railing? 1  -CB     To walk in hospital room? 1  -CB     AM-PAC 6 Clicks Score (PT) 6  -CB     Highest Level of Mobility Goal 2 --> Bed activities/dependent transfer  -CB       Row Name 06/19/24 1539          Modified Grayson Scale    Modified Grayson Scale 5 - Severe disability.  Bedridden, incontinent, and requiring constant nursing care and attention.  -MM       Row Name 06/19/24 1539 06/19/24 1534       Functional Assessment    Outcome Measure Options AM-PAC 6 Clicks Daily Activity (OT);Modified Grayson  - AM-PAC 6 Clicks Basic Mobility (PT)  -CB              User Key  (r) = Recorded By, (t) = Taken By, (c) = Cosigned By      Initials Name Provider Type    CB Shari Monique, PT Physical Therapist    Mindy Velasco OT Occupational Therapist                    Occupational Therapy Education       Title: PT OT SLP Therapies (In Progress)       Topic:  Occupational Therapy (In Progress)       Point: ADL training (In Progress)       Description:   Instruct learner(s) on proper safety adaptation and remediation techniques during self care or transfers.   Instruct in proper use of assistive devices.                  Learning Progress Summary             Patient Acceptance, E, NL by MM at 6/19/2024 1539    Comment: role of OT                         Point: Home exercise program (Not Started)       Description:   Instruct learner(s) on appropriate technique for monitoring, assisting and/or progressing therapeutic exercises/activities.                  Learner Progress:  Not documented in this visit.              Point: Precautions (In Progress)       Description:   Instruct learner(s) on prescribed precautions during self-care and functional transfers.                  Learning Progress Summary             Patient Acceptance, E, NL by MM at 6/19/2024 1539    Comment: role of OT                         Point: Body mechanics (In Progress)       Description:   Instruct learner(s) on proper positioning and spine alignment during self-care, functional mobility activities and/or exercises.                  Learning Progress Summary             Patient Acceptance, E, NL by MM at 6/19/2024 1539    Comment: role of OT                                         User Key       Initials Effective Dates Name Provider Type Discipline     05/31/24 -  Mindy Morrow OT Occupational Therapist OT                  OT Recommendation and Plan  Therapy Frequency (OT): evaluation only  Plan of Care Review  Plan of Care Reviewed With: patient  Progress: no change  Outcome Evaluation: pt is an 78 yo male admitted from LTC with pain and decreased level of alertness. He is seen this date for OT eval, A&O to self, pt unable to provide detailed prior level however reports he does not get out of bed, pressure sores noted throughout entire spine as well as bottom. Per chart, reports noted chronic  immobility. He is depx2 for rolling this date, resistive to all movement when assessing BUE ROM. Dep for all ADLs anticipated at facility and current presentation. He presents at his baseline, he is not appropriate for skilled OT services, recommend to return to LTC at d/c. Will sign off.     Time Calculation:   Evaluation Complexity (OT)  Review Occupational Profile/Medical/Therapy History Complexity: brief/low complexity  Clinical Decision Making Complexity (OT): problem focused assessment/low complexity  Overall Complexity of Evaluation (OT): low complexity     Time Calculation- OT       Row Name 06/19/24 1540             Time Calculation- OT    OT Start Time 1432  -MM      OT Stop Time 1445  -MM      OT Time Calculation (min) 13 min  -MM      OT Received On 06/19/24  -MM         Untimed Charges    OT Eval/Re-eval Minutes 13  -MM         Total Minutes    Untimed Charges Total Minutes 13  -MM       Total Minutes 13  -MM                User Key  (r) = Recorded By, (t) = Taken By, (c) = Cosigned By      Initials Name Provider Type     Mindy Morrow OT Occupational Therapist                  Therapy Charges for Today       Code Description Service Date Service Provider Modifiers Qty    [id number]  OT EVAL LOW COMPLEXITY 3 6/19/2024 Mindy Morrow OT GO 1                 Mindy Morrow OT  6/19/2024

## 2024-06-19 NOTE — THERAPY EVALUATION
Patient Name: Anthony Gallegos  : 1945    MRN: [id number]                              Today's Date: 2024       Admit Date: 2024    Visit Dx:     ICD-10-CM ICD-9-CM   1. Acute renal failure, unspecified acute renal failure type  N17.9 584.9   2. Chronic anemia  D64.9 285.9   3. Hyponatremia  E87.1 276.1   4. Urinary tract infection associated with indwelling urethral catheter, initial encounter  T83.511A 996.64    N39.0 599.0   5. Acute urinary retention  R33.8 788.29     Patient Active Problem List   Diagnosis    Ankylosing spondylitis of cervical region    Recent unexplained weight loss    Abnormal serum lipase level    Abnormal serum level of amylase    Hypertension    Boil    Immobility    Fall from bed    Tetrahydrocannabinol (THC) use disorder, mild, abuse    Generalized pain        Grief    DISH (disseminated idiopathic skeletal hyperostosis)    Generalized weakness    Severe malnutrition    Altered mental status    Transient alteration of awareness    GERD without esophagitis    BPH (benign prostatic hyperplasia)    UTI (urinary tract infection)    Hyperglycemia    Decreased oral intake    Dysphagia    Failure to thrive in adult    Immunosuppression due to drug therapy    Renal failure    UTI (urinary tract infection), bacterial    ARF (acute renal failure)     Past Medical History:   Diagnosis Date    Abscess of scrotum     MARTITA (acute kidney injury)     Altered mental state     Arthritis     AS (ankylosing spondylitis)     History of MRSA infection     Hypertension     Leukocytosis     Tetrahydrocannabinol (THC) use disorder, mild, abuse 2023    Uveitis      Past Surgical History:   Procedure Laterality Date    COLONOSCOPY      ENDOSCOPY W/ PEG TUBE PLACEMENT N/A 3/29/2024    Procedure: ESOPHAGOGASTRODUODENOSCOPY WITH PERCUTANEOUS ENDOSCOPIC GASTROSTOMY TUBE INSERTION;  Surgeon: Khadra Broderick MD;  Location: [facility] ENDOSCOPY;  Service: General;  Laterality: N/A;   PRE/POST - DYSPHAGIA    ROTATOR CUFF REPAIR Right     TOTAL HIP ARTHROPLASTY Left 2014      General Information       Row Name 06/19/24 1528          Physical Therapy Time and Intention    Document Type discharge evaluation/summary  -CB     Mode of Treatment co-treatment;physical therapy;occupational therapy  -CB       Row Name 06/19/24 1528          General Information    Patient Profile Reviewed yes  -CB     Prior Level of Function --  pt unable to state PLOF but multiple wounds noted and would assume based on presentation pt is bedbound at baseline  -CB     Existing Precautions/Restrictions fall  -CB     Barriers to Rehab previous functional deficit;cognitive status  -CB       Row Name 06/19/24 1528          Living Environment    People in Home facility resident  -CB       Row Name 06/19/24 1528          Cognition    Orientation Status (Cognition) oriented to;person  difficulty understanding pt when he speaks  -CB       Row Name 06/19/24 1528          Safety Issues, Functional Mobility    Comment, Safety Issues/Impairments (Mobility) Co treatment medically appropriate and necessary due to activity tolerance and safety of patient and staff.  -CB               User Key  (r) = Recorded By, (t) = Taken By, (c) = Cosigned By      Initials Name Provider Type    CB Shari Monique, PT Physical Therapist                   Mobility       Row Name 06/19/24 1531          Bed Mobility    Bed Mobility rolling left;rolling right  Simultaneous filing. User may be unaware of other data.  -CB     Rolling Left [place] (Bed Mobility) dependent (less than 25% patient effort);2 person assist;verbal cues  Simultaneous filing. User may be unaware of other data.  -CB     Rolling Right [place] (Bed Mobility) dependent (less than 25% patient effort);2 person assist;verbal cues  Simultaneous filing. User may be unaware of other data.  -CB     Assistive Device (Bed Mobility) head of bed elevated  -CB               User Key  (r) =  Recorded By, (t) = Taken By, (c) = Cosigned By      Initials Name Provider Type    CB Shari Monique, PT Physical Therapist                   Obj/Interventions       Row Name 06/19/24 1532          Range of Motion Comprehensive    General Range of Motion lower extremity range of motion deficits identified  -CB       Row Name 06/19/24 1532          Strength Comprehensive (MMT)    Comment, General Manual Muscle Testing (MMT) Assessment resistive to movement  -CB       Row Name 06/19/24 1532          Sensory Assessment (Somatosensory)    Sensory Assessment (Somatosensory) unable/difficult to assess  -CB               User Key  (r) = Recorded By, (t) = Taken By, (c) = Cosigned By      Initials Name Provider Type    CB Shari Monique, PT Physical Therapist                   Goals/Plan    No documentation.                  Clinical Impression       Row Name 06/19/24 1532          Pain    Pretreatment Pain Rating 0/10 - no pain  -CB     Posttreatment Pain Rating 0/10 - no pain  -CB       Row Name 06/19/24 1532          Plan of Care Review    Plan of Care Reviewed With patient  -CB     Outcome Evaluation Patient is an 79 y.o. male admitted to [facility] for renal failure, hyponatremia, urinary retention, LUTS, HTN, BPH. Patient is unable to state PLOF at this time but would assume pt is bedbound at baseline due to multiple wounds in place upon arrival. Pt is depx2 for rolling R<>L and pt resistant to all movement. PT rec pt return to LTC facility with proper turning schedule and positioning for pressure relief.  -CB       Row Name 06/19/24 1532          Therapy Assessment/Plan (PT)    Criteria for Skilled Interventions Met (PT) does not meet criteria for skilled intervention  pt at baseline  -CB     Therapy Frequency (PT) evaluation only  -CB       Row Name 06/19/24 1532          Positioning and Restraints    Pre-Treatment Position in bed  -CB     Post Treatment Position bed  -CB     In Bed notified nsg;fowlers;call light within  reach;encouraged to call for assist;exit alarm on;side rails up x3  -CB               User Key  (r) = Recorded By, (t) = Taken By, (c) = Cosigned By      Initials Name Provider Type    Shari Nowak PT Physical Therapist                   Outcome Measures       Row Name 06/19/24 1534          How much help from another person do you currently need...    Turning from your back to your side while in flat bed without using bedrails? 1  -CB     Moving from lying on back to sitting on the side of a flat bed without bedrails? 1  -CB     Moving to and from a bed to a chair (including a wheelchair)? 1  -CB     Standing up from a chair using your arms (e.g., wheelchair, bedside chair)? 1  -CB     Climbing 3-5 steps with a railing? 1  -CB     To walk in hospital room? 1  -CB     AM-PAC 6 Clicks Score (PT) 6  -CB     Highest Level of Mobility Goal 2 --> Bed activities/dependent transfer  -CB       Row Name 06/19/24 1534          Functional Assessment    Outcome Measure Options AM-PAC 6 Clicks Basic Mobility (PT)  -CB               User Key  (r) = Recorded By, (t) = Taken By, (c) = Cosigned By      Initials Name Provider Type    Shari Nowak PT Physical Therapist                                 Physical Therapy Education       Title: PT OT SLP Therapies (Not Started)       Topic: Physical Therapy (Not Started)       Point: Mobility training (Not Started)       Learner Progress:  Not documented in this visit.              Point: Home exercise program (Not Started)       Learner Progress:  Not documented in this visit.              Point: Body mechanics (Not Started)       Learner Progress:  Not documented in this visit.              Point: Precautions (Not Started)       Learner Progress:  Not documented in this visit.                                  PT Recommendation and Plan     Plan of Care Reviewed With: patient  Outcome Evaluation: Patient is an 79 y.o. male admitted to [facility] for renal failure, hyponatremia,  urinary retention, LUTS, HTN, BPH. Patient is unable to state PLOF at this time but would assume pt is bedbound at baseline due to multiple wounds in place upon arrival. Pt is depx2 for rolling R<>L and pt resistant to all movement. PT rec pt return to LTC facility with proper turning schedule and positioning for pressure relief.     Time Calculation:         PT Charges       Row Name 06/19/24 1537             Time Calculation    Start Time 1432  -CB      Stop Time 1444  -CB      Time Calculation (min) 12 min  -CB      PT Received On 06/19/24  -CB                User Key  (r) = Recorded By, (t) = Taken By, (c) = Cosigned By      Initials Name Provider Type    CB Shari Monique, PT Physical Therapist                  Therapy Charges for Today       Code Description Service Date Service Provider Modifiers Qty    10024166114 HC PT EVAL LOW COMPLEXITY 3 6/19/2024 Shari Monique, PT GP 1            PT G-Codes  Outcome Measure Options: AM-PAC 6 Clicks Basic Mobility (PT)  AM-PAC 6 Clicks Score (PT): 6  PT Discharge Summary  Anticipated Discharge Disposition (PT): extended care facility (PT rec pt return to LTC facility with proper turning schedule and positioning for pressure relief as he had wounds on admission.)    Shari Monique, GERI  6/19/2024

## 2024-06-19 NOTE — MBS/VFSS/FEES
Acute Care - Speech Language Pathology   Swallow Initial Evaluation [facility]     Patient Name: Anthony Gallegos  : 1945  MRN: [id number]  Today's Date: 2024               Admit Date: 2024    Visit Dx:     ICD-10-CM ICD-9-CM   1. Acute renal failure, unspecified acute renal failure type  N17.9 584.9   2. Chronic anemia  D64.9 285.9   3. Hyponatremia  E87.1 276.1   4. Urinary tract infection associated with indwelling urethral catheter, initial encounter  T83.511A 996.64    N39.0 599.0   5. Acute urinary retention  R33.8 788.29     Patient Active Problem List   Diagnosis    Ankylosing spondylitis of cervical region    Recent unexplained weight loss    Abnormal serum lipase level    Abnormal serum level of amylase    Hypertension    Boil    Immobility    Fall from bed    Tetrahydrocannabinol (THC) use disorder, mild, abuse    Generalized pain        Grief    DISH (disseminated idiopathic skeletal hyperostosis)    Generalized weakness    Severe malnutrition    Altered mental status    Transient alteration of awareness    GERD without esophagitis    BPH (benign prostatic hyperplasia)    UTI (urinary tract infection)    Hyperglycemia    Decreased oral intake    Dysphagia    Failure to thrive in adult    Immunosuppression due to drug therapy    Renal failure    UTI (urinary tract infection), bacterial    ARF (acute renal failure)     Past Medical History:   Diagnosis Date    Abscess of scrotum     MARTITA (acute kidney injury)     Altered mental state     Arthritis     AS (ankylosing spondylitis)     History of MRSA infection     Hypertension     Leukocytosis     Tetrahydrocannabinol (THC) use disorder, mild, abuse 2023    Uveitis      Past Surgical History:   Procedure Laterality Date    COLONOSCOPY      ENDOSCOPY W/ PEG TUBE PLACEMENT N/A 3/29/2024    Procedure: ESOPHAGOGASTRODUODENOSCOPY WITH PERCUTANEOUS ENDOSCOPIC GASTROSTOMY TUBE INSERTION;  Surgeon: Khadar Broderick MD;   Location: [facility] ENDOSCOPY;  Service: General;  Laterality: N/A;  PRE/POST - DYSPHAGIA    ROTATOR CUFF REPAIR Right     TOTAL HIP ARTHROPLASTY Left 2014       SLP Recommendation and Plan  SLP Swallowing Diagnosis: (P) moderate, oral dysphagia, pharyngeal dysphagia (06/19/24 1300)  SLP Diet Recommendation: (P) puree, honey thick liquids, ice chips between meals after oral care, with supervision (06/19/24 1300)  Recommended Precautions and Strategies: (P) upright posture during/after eating, small bites of food and sips of liquid, no straw, alternate between small bites of food and sips of liquid, multiple swallows per bite of food, multiple swallows per sip of liquid (06/19/24 1300)  SLP Rec. for Method of Medication Administration: (P) meds crushed, with puree (06/19/24 1300)     Monitor for Signs of Aspiration: (P) yes, notify SLP if any concerns (06/19/24 1300)     Swallow Criteria for Skilled Therapeutic Interventions Met: (P) demonstrates skilled criteria (06/19/24 1300)  Anticipated Discharge Disposition (SLP): (P) anticipate therapy at next level of care (06/19/24 1300)  Rehab Potential/Prognosis, Swallowing: (P) adequate, monitor progress closely (06/19/24 1300)  Therapy Frequency (Swallow): (P) PRN (06/19/24 1300)  Predicted Duration Therapy Intervention (Days): (P) until discharge (06/19/24 1300)  Oral Care Recommendations: (P) Oral Care BID/PRN (06/19/24 1300)                                        Plan of Care Reviewed With: (P) patient  Outcome Evaluation: (P) VFSS completed. Pt demonstrates moderate oropharyngeal dysphagia. Recommend puree and HTL. Meds crushed with puree. Small bites/sips, alternate liquid wash with bites, upright positioning, small bites/sips, no straw.      SWALLOW EVALUATION (Last 72 Hours)       SLP Adult Swallow Evaluation       Row Name 06/19/24 1300 06/18/24 0290                Rehab Evaluation    Document Type evaluation (P)   -MR evaluation  -AW       Subjective Information no  complaints (P)   -MR no complaints  -AW       Patient Observations alert;cooperative (P)   -MR alert;cooperative;agree to therapy  -AW       Patient/Family/Caregiver Comments/Observations -- Pt followed directions for eval; difficult to obtain history from pt; no family present.  -AW       Patient Effort good (P)   -MR good  -AW       Symptoms Noted During/After Treatment none (P)   -MR none  -AW          General Information    Patient Profile Reviewed yes (P)   -MR yes  -AW       Pertinent History Of Current Problem -- Pt admitted with AMS, UTI, MARTITA, hyponatremia; h/o dysphagia w/ PEG placed 3/29, failure to thrive. Pt was on mech soft and thin at [facility] and was working with ST.  -AW       Current Method of Nutrition -- NPO  -AW       Precautions/Limitations, Vision -- WFL;for purposes of eval  -AW       Precautions/Limitations, Hearing -- WFL;for purposes of eval  -AW       Prior Level of Function-Communication -- motor speech impairment;cognitive-linguistic impairment  -AW       Prior Level of Function-Swallowing -- soft to chew;thin liquids;other (see comments)  at [facility]; pt working with ST; VFSS 3/28/24 recommended NPO  -AW       Plans/Goals Discussed with -- patient;agreed upon  -AW       Barriers to Rehab -- previous functional deficit;cognitive status  -AW       Patient's Goals for Discharge -- patient did not state  -AW          Pain    Additional Documentation -- Pain Scale: Numbers Pre/Post-Treatment (Group)  -AW          Pain Scale: Numbers Pre/Post-Treatment    Pretreatment Pain Rating -- 0/10 - no pain  -AW       Posttreatment Pain Rating -- 0/10 - no pain  -AW          Oral Motor Structure and Function    Dentition Assessment -- edentulous, dentures not available  -AW       Secretion Management -- WNL/WFL  -AW       Mucosal Quality -- moist, healthy  -AW          Oral Musculature and Cranial Nerve Assessment    Oral Motor General Assessment -- generalized oral motor weakness  -AW          General  Eating/Swallowing Observations    Respiratory Support Currently in Use -- room air  -AW       Eating/Swallowing Skills -- fed by SLP;self-fed  -AW       Positioning During Eating -- upright in bed  -AW       Utensils Used -- spoon;cup;straw  -AW       Consistencies Trialed -- ice chips;thin liquids;nectar/syrup-thick liquids;pureed  -AW          Clinical Swallow Eval    Clinical Swallow Evaluation Summary -- Swallow eval completed. Pt given ice chips, thin (spoon/cup), nectar (cup/straw), and pureed trials. Swallow was mistimed/delayed with multiple swallows noted (2-4). Throat clearing noted inconsistently (delayed at times) across trials. Given pt's hx of silent aspiration, significant residuals, and current status, recommend NPO w/ use of PEG for nutrition and meds. ST to further assess via VFSS. Pt may have ice chips sparingly after oral care.  -AW          MBS/VFSS    Utensils Used spoon;cup (P)   -MR --       Consistencies Trialed pureed;thin liquids;nectar/syrup-thick liquids;honey-thick liquids (P)   -MR --          MBS/VFSS Interpretation    VFSS Summary Nurse Practitioner Kristal Bustillos present. Pt demonstrates moderate oropharyngeal dysphagia characterized by reduced pharyngeal constriction, BOT retraction, reduced airway protection, and reduced laryngeal vestibular closure. Oral phase characterized by piecemeal deglutition, premature spillage, and disorganized mastication. On first trial of NTL via cup, pt demonstrated no pen/asp. When NTL was used as liquid wash following puree trials, pt demonstrated penetration (deeper penetration as trials progressed) with trace aspiration. With thins via cup, pt demonstrated penetration with trace aspiration. No pen/asp with puree trials and HTL trials. Pt demonstrated weak throat clear in response to pen/asp events, however, was not effective in clearing laryngeal vestibular residue. Cued cough was also not effective. On first trial of puree, pt demonstrated moderate  vallecular residue. On subsequent trials, pt demonstrated mild residue. Liquid wash assisted with clearing. Pt demonstrated mild pharyngeal residue with all liquid consistencies. (P)   -MR --          SLP Communication to Radiology    Summary Statement Nurse Practitioner Kristal Bustillos present. Pt demonstrates moderate oropharyngeal dysphagia characterized by reduced pharyngeal constriction, BOT retraction, reduced airway protection, and reduced laryngeal vestibular closure. Oral phase characterized by piecemeal deglutition, premature spillage, and disorganized mastication. On first trial of NTL via cup, pt demonstrated no pen/asp. When NTL was used as liquid wash following puree trials, pt demonstrated penetration (deeper penetration as trials progressed) with trace aspiration. With thins via cup, pt demonstrated penetration with trace aspiration. No pen/asp with puree trials and HTL trials. Pt demonstrated weak throat clear in response to pen/asp events, however, was not effective in clearing laryngeal vestibular residue. Cued cough was also not effective. On first trial of puree, pt demonstrated moderate vallecular residue. On subsequent trials, pt demonstrated mild residue. Liquid wash assisted with clearing. Pt demonstrated mild pharyngeal residue with all liquid consistencies. (P)   -MR --          SLP Evaluation Clinical Impression    SLP Swallowing Diagnosis moderate;oral dysphagia;pharyngeal dysphagia (P)   -MR oral dysphagia;suspected pharyngeal dysphagia  -AW       Functional Impact risk of aspiration/pneumonia (P)   -MR risk of aspiration/pneumonia;risk of malnutrition;risk of dehydration  -AW       Rehab Potential/Prognosis, Swallowing adequate, monitor progress closely (P)   -MR adequate, monitor progress closely  -AW       Swallow Criteria for Skilled Therapeutic Interventions Met demonstrates skilled criteria (P)   -MR demonstrates skilled criteria  -AW          Recommendations    Therapy Frequency  (Swallow) PRN (P)   -MR PRN  -AW       Predicted Duration Therapy Intervention (Days) until discharge (P)   -MR until discharge  -AW       SLP Diet Recommendation puree;honey thick liquids;ice chips between meals after oral care, with supervision (P)   -MR NPO;ice chips between meals after oral care, with supervision  -AW       Recommended Diagnostics -- VFSS (MBS)  -AW       Recommended Precautions and Strategies upright posture during/after eating;small bites of food and sips of liquid;no straw;alternate between small bites of food and sips of liquid;multiple swallows per bite of food;multiple swallows per sip of liquid (P)   -MR --       Oral Care Recommendations Oral Care BID/PRN (P)   -MR Oral Care BID/PRN  -AW       SLP Rec. for Method of Medication Administration meds crushed;with puree (P)   -MR meds via alternate route  -AW       Monitor for Signs of Aspiration yes;notify SLP if any concerns (P)   -MR yes;notify SLP if any concerns  -AW       Anticipated Discharge Disposition (SLP) anticipate therapy at next level of care (P)   -MR unknown;anticipate therapy at next level of care  -AW          Swallow Goals (SLP)    Swallow STGs diet tolerance goal selection (SLP) (P)   -MR --       Diet Tolerance Goal Selection (SLP) Patient will tolerate trials of (P)   -MR --          (STG) Patient will tolerate trials of    Consistencies Trialed (Tolerate trials) pureed textures;honey/ moderately thick liquids (P)   -MR --       Desired Outcome (Tolerate trials) without signs/symptoms of aspiration (P)   -MR --       Sidon (Tolerate trials) independently (over 90% accuracy) (P)   -MR --       Time Frame (Tolerate trials) by discharge (P)   -MR --                 User Key  (r) = Recorded By, (t) = Taken By, (c) = Cosigned By      Initials Name Effective Dates    AW Ashtyn Johnson, SLP 08/28/23 -     Chichi Guo, Speech Therapy Student 05/15/24 -                     EDUCATION  The patient has been educated in  the following areas:   Dysphagia (Swallowing Impairment).        SLP GOALS       Row Name 06/19/24 1300             (STG) Patient will tolerate trials of    Consistencies Trialed (Tolerate trials) pureed textures;honey/ moderately thick liquids (P)   -MR      Desired Outcome (Tolerate trials) without signs/symptoms of aspiration (P)   -MR      Sandoval (Tolerate trials) independently (over 90% accuracy) (P)   -MR      Time Frame (Tolerate trials) by discharge (P)   -MR                User Key  (r) = Recorded By, (t) = Taken By, (c) = Cosigned By      Initials Name Provider Type     DaughertyChichi Speech Therapy Student SLP Student                         Time Calculation:    Time Calculation- SLP       Row Name 06/19/24 1409             Time Calculation- SLP    SLP Start Time 1115 (P)   -MR      SLP Received On 06/19/24 (P)   -MR         Untimed Charges    16958-XN Motion Fluoro Eval Swallow Minutes 75 (P)   -MR         Total Minutes    Untimed Charges Total Minutes 75 (P)   -MR       Total Minutes 75 (P)   -MR                User Key  (r) = Recorded By, (t) = Taken By, (c) = Cosigned By      Initials Name Provider Type     DaughertyChichi, Speech Therapy Student SLP Student                    Therapy Charges for Today       Code Description Service Date Service Provider Modifiers Qty    49750959904 HC ST MOTION FLUORO EVAL SWALLOW 5 6/19/2024 Chichi Daugherty Speech Therapy Student GN 1                 Chichi Daugherty Speech Therapy Student  6/19/2024

## 2024-06-19 NOTE — CONSULTS
Nutrition Services    Patient Name:  Anthony Gallegos  YOB: 1945  MRN: [id number]  Admit Date:  6/17/2024    Assessment Date:  06/19/24    Summary: Nurse Admission Screen Consult    Pt is a 79 y.o. male admitted with c/o acute pain. History of ankylosing spondylitis, MARTITA, HTN, chronic pain. Pt has a G-tube but eats a modified diet at his facility and uses G-tube for medications at times per MD. Swallow eval completed yesterday and SLP recommends NPO w use of PEG for nutrition. Plan for video swallow. Speech following. Per MD, if diet does not advance, will start tube feeds soon. Recs below. Wt hx reflects 23.1% wt loss x 3 months (significant). NFPE completed. See MSA.  Labs: Na 134, BUN 89, Creat 3.57, Mag 2.5, Albumin 2.9  Meds:  mL/hr    Patient meets ASPEN/AND criteria for nutrition diagnosis of severe malnutrition of chronic illness based on: unintended wt loss, nfpe    Recommendations: (if tube feeds started)  Formula: Novasource Renal   Method: Continuous  Feeding Schedule: 24 hr/day  Initial Rate/Volume: 15 mL (advance as tolerated)  Goal Rate/Volume: 45 mL  Water Flush: Per Nephrology (Na 134)     RD to follow tolerance, labs and clinical course.    CLINICAL NUTRITION ASSESSMENT      Reason for Assessment Nurse Admission Screen     Diagnosis/Problem   UTI  ARF  Failure to thrive  Dysphagia with feeding tube (though apparently eats modified diet)  Generalized weakness   Medical/Surgical History Past Medical History:   Diagnosis Date    Abscess of scrotum     MARTITA (acute kidney injury)     Altered mental state     Arthritis     AS (ankylosing spondylitis)     History of MRSA infection     Hypertension     Leukocytosis     Tetrahydrocannabinol (THC) use disorder, mild, abuse 12/20/2023    Uveitis        Past Surgical History:   Procedure Laterality Date    COLONOSCOPY      ENDOSCOPY W/ PEG TUBE PLACEMENT N/A 3/29/2024    Procedure: ESOPHAGOGASTRODUODENOSCOPY WITH PERCUTANEOUS ENDOSCOPIC  "GASTROSTOMY TUBE INSERTION;  Surgeon: Khadar Broderick MD;  Location: [facility] ENDOSCOPY;  Service: General;  Laterality: N/A;  PRE/POST - DYSPHAGIA    ROTATOR CUFF REPAIR Right     TOTAL HIP ARTHROPLASTY Left 2014        Anthropometrics        Current Height  Current Weight  BMI kg/m2 Height: 190.5 cm (75\")  Weight: 63.1 kg (139 lb 1.6 oz) (06/17/24 2351)  Body mass index is 17.39 kg/m².   Adjusted BMI (if applicable)    BMI Category Underweight (18.4 or below)   Ideal Body Weight (IBW) 186 lbs   Usual Body Weight (UBW) unknown   Weight Trend Loss, Amount/Timeframe: 23.1% wt loss x 3 months   Weight History Wt Readings from Last 30 Encounters:   06/17/24 2351 63.1 kg (139 lb 1.6 oz)   04/09/24 0516 90.4 kg (199 lb 4.7 oz)   04/08/24 0420 90.5 kg (199 lb 8.3 oz)   04/07/24 0600 81 kg (178 lb 9.2 oz)   04/06/24 0500 80 kg (176 lb 5.9 oz)   04/03/24 0521 81.4 kg (179 lb 7.3 oz)   04/02/24 0435 80.4 kg (177 lb 4 oz)   04/01/24 0420 80.9 kg (178 lb 5.6 oz)   03/31/24 0530 82.7 kg (182 lb 6.4 oz)   03/30/24 0557 82.2 kg (181 lb 3.5 oz)   03/29/24 0548 82.6 kg (182 lb 1.6 oz)   03/28/24 0500 76.9 kg (169 lb 8 oz)   03/27/24 0617 76.4 kg (168 lb 6.4 oz)   03/25/24 2123 69.1 kg (152 lb 6.4 oz)   03/25/24 1516 79 kg (174 lb 2.6 oz)   01/31/24 0639 79 kg (174 lb 3.2 oz)   01/29/24 0509 78.8 kg (173 lb 11.6 oz)   01/28/24 0530 77.7 kg (171 lb 4.8 oz)   01/25/24 0537 76.4 kg (168 lb 6.9 oz)   01/23/24 0542 73.2 kg (161 lb 6 oz)   01/22/24 0502 75.1 kg (165 lb 9.1 oz)   01/21/24 2304 75.5 kg (166 lb 7.2 oz)   01/21/24 1744 81.6 kg (180 lb)   01/08/24 0253 81.6 kg (179 lb 14.3 oz)   01/05/24 0440 87.9 kg (193 lb 12.6 oz)   01/04/24 0439 85.6 kg (188 lb 11.4 oz)   01/03/24 0513 82.5 kg (181 lb 14.1 oz)   12/31/23 1300 80.5 kg (177 lb 7.5 oz)   12/31/23 0350 83.8 kg (184 lb 11.9 oz)   12/30/23 0511 85.3 kg (188 lb 0.8 oz)   12/29/23 0341 85.7 kg (188 lb 15 oz)   12/28/23 0435 85.4 kg (188 lb 4.4 oz)   12/27/23 0755 81 kg (178 " lb 9.2 oz)   12/27/23 0423 85.4 kg (188 lb 4.4 oz)   12/25/23 0707 85.2 kg (187 lb 13.3 oz)   12/23/23 0608 81.3 kg (179 lb 3.7 oz)   12/22/23 0430 81.8 kg (180 lb 5.4 oz)   12/20/23 1005 81.9 kg (180 lb 8 oz)   03/27/18 1439 100 kg (221 lb)   03/22/18 1613 101 kg (222 lb 12.8 oz)   01/23/18 1414 101 kg (223 lb)   12/26/17 1111 99.8 kg (220 lb)   10/19/17 1239 95.3 kg (210 lb)   08/30/17 1459 91.3 kg (201 lb 3.2 oz)   08/29/17 1221 93 kg (205 lb)   08/10/17 1517 88 kg (194 lb)   07/14/17 1041 85.9 kg (189 lb 6.4 oz)   06/29/17 1318 84.5 kg (186 lb 3.2 oz)   06/21/17 1922 90.7 kg (200 lb)      --  Labs       Pertinent Labs    Results from last 7 days   Lab Units 06/18/24  0711 06/18/24  0033   SODIUM mmol/L 134* 130*   POTASSIUM mmol/L 4.9 5.7*   CHLORIDE mmol/L 99 96*   CO2 mmol/L 20.5* 20.5*   BUN mg/dL 89* 103*   CREATININE mg/dL 3.57* 3.75*   CALCIUM mg/dL 8.5* 9.1   BILIRUBIN mg/dL  --  0.6   ALK PHOS U/L  --  78   ALT (SGPT) U/L  --  25   AST (SGOT) U/L  --  34   GLUCOSE mg/dL 94 111*     Results from last 7 days   Lab Units 06/18/24  0711 06/18/24  0033   MAGNESIUM mg/dL  --  2.5*   HEMOGLOBIN g/dL 10.4* 10.5*   HEMATOCRIT % 31.2* 31.7*   WBC 10*3/mm3 5.17 5.95   ALBUMIN g/dL  --  2.9*     Results from last 7 days   Lab Units 06/18/24  0711 06/18/24  0033   PLATELETS 10*3/mm3 248 281     COVID19   Date Value Ref Range Status   03/25/2024 Not Detected Not Detected - Ref. Range Final     Lab Results   Component Value Date    HGBA1C 5.80 (H) 01/23/2024          Medications           Scheduled Medications ertapenem, 500 mg, Intravenous, Q24H  lansoprazole, 30 mg, Per G Tube, Q AM  sodium chloride, 10 mL, Intravenous, Q12H  terazosin, 1 mg, Per G Tube, Nightly       Infusions sodium chloride, 100 mL/hr, Last Rate: 100 mL/hr (06/19/24 0459)       PRN Medications   acetaminophen **OR** acetaminophen **OR** acetaminophen    senna-docusate sodium **AND** polyethylene glycol **AND** bisacodyl **AND** bisacodyl     calcium carbonate    HYDROcodone-acetaminophen    ondansetron ODT **OR** ondansetron    sodium chloride    sodium chloride     Physical Findings          General Findings disoriented, hard of hearing, impaired speech   Oral/Mouth Cavity tooth or teeth missing   Edema  no edema   Gastrointestinal fecal incontinence   Skin  pressure injury: lumbar spine - stage 1, R scapula - stage 1, R lateral foot - deep tissue, R ankle - deep tissue, R heel - deep tissue, Bilateral coccyx - stage 4, location of wound: L posterior heel   Tubes/Drains/Lines PEG   NFPE See Malnutrition Severity Assessment     Malnutrition Severity Assessment      Patient meets criteria for : (P) Severe Malnutrition  Malnutrition Type (Last 8 Hours)       Malnutrition Severity Assessment       Row Name 06/19/24 1338       Malnutrition Severity Assessment    Malnutrition Type Chronic Disease - Related Malnutrition (P)       Row Name 06/19/24 1338       Unintentional Weight Loss     Unintentional Weight Loss Findings Severe (P)     Unintentional Weight Loss  Weight loss greater than 7.5% in three months (P)       Row Name 06/19/24 1338       Muscle Loss    Loss of Muscle Mass Findings Severe (P)     [place] Region Severe - deep hollowing/scooping, lack of muscle to touch, facial bones well defined (P)     Clavicle Bone Region Severe - protruding prominent bone (P)     Acromion Bone Region Moderate - acromion may slightly protrude (P)     Dorsal Hand Region Severe - prominent depression (P)       Row Name 06/19/24 1338       Fat Loss    Subcutaneous Fat Loss Findings Severe (P)     Orbital Region  Severe - pronounced hollowness/depression, dark circles, loose saggy skin (P)     Upper Arm Region Severe - mostly skin, very little space between folds, fingers touch (P)       Row Name 06/19/24 4348       Criteria Met (Must meet criteria for severity in at least 2 of these categories: M Wasting, Fat Loss, Fluid, Secondary Signs, Wt. Status, Intake)    Patient  meets criteria for  Severe Malnutrition (P)                      Current Nutrition Orders & Evaluation of Intake       Oral Nutrition     Food Allergies NKFA   Current PO Diet NPO Diet NPO Type: Strict NPO   Supplement n/a   PO Evaluation     % PO Intake NPO    Factors Affecting Intake: swallow impairment     Estimated Requirements         Weight used  63.1 kg    Calories  2209 kcals (35 kcal/kg)    Protein  95 - 126 g (1.5 - 2.0 gm/kg)    Fluid   (Defer to physician)     PES STATEMENT / NUTRITION DIAGNOSIS      Nutrition Dx Problem  Problem: Malnutrition (severe)  Etiology: Medical Diagnosis - History of ankylosing spondylitis, MARTITA, HTN, chronic pain, silent aspiration and Factors Affecting Nutrition - swallow impairment, AMS    Signs/Symptoms: NFPE Results, BMI, Unintended Weight Change, and Report/Observation     NUTRITION INTERVENTION / PLAN OF CARE      Intervention Goal(s) Establish goals of care, Meet estimated needs, Initiate feeding/diet, Initiate TF/PN, No significant weight loss, and Appropriate weight gain         RD Intervention/Action Await initiation/advancement of PO diet, Await initiation of EN/PN, and Continue to monitor   --      Prescription/Orders:       PO Diet       Supplements       Enteral Nutrition Enteral Prescription:      Enteral Route PEG    TF Delivery Method Continuous    Enteral Product Novasource Renal    Modular None    Propofol Rate/Kcal      TF Start Rate/Volume  15 mL    TF Goal Rate/Volume 45 mL     Free Water Flush Per nephrology    TF Provision at Goal:           Calories 2160 kcal, meets 98 % needs         Protein  98 gm protein, meets 100 % needs         Fluid (mL) Per Nephrology (Na 134)     Prescription Ordered No, recommended         Parenteral Nutrition    New Prescription Ordered? No, Recommended   --      Monitor/Evaluation Per protocol   Discharge Plan/Needs Pending clinical course   --    RD to follow per protocol.      Electronically signed by:  Mindy  Blades  06/19/24 08:24 EDT

## 2024-06-19 NOTE — PLAN OF CARE
Problem: Adult Inpatient Plan of Care  Goal: Plan of Care Review  Outcome: Ongoing, Progressing  Flowsheets (Taken 6/19/2024 5485)  Progress: no change  Plan of Care Reviewed With: patient  Patient had no complaints overnight. Alert to self. Remains on RA. VSS. IVF's continue. Antibiotics continued. Blood cultures +. Q 2 turn, Patient refused at times, educated on importance of turning. Dressing changed on coccyx. All needs met. Plan for video swallow per speech.

## 2024-06-19 NOTE — PROGRESS NOTES
"    DAILY PROGRESS NOTE  [facility]    Patient Identification:  Name: Anthony Gallegos  Age: 79 y.o.  Sex: male  :  1945  MRN: [id number]         Primary Care Physician: Keshav Eason MD    Subjective:  Interval History: History and ROS not really obtainable as this patient is not the most verbal but he is putting together some words today as opposed to yesterday when he would just minimally move or grunt.  Seems somewhat clinically improved today.    Objective: Horribly cachectic and chronically ill with failure to thrive.  No family at bedside    Scheduled Meds:ertapenem, 500 mg, Intravenous, Q24H  lansoprazole, 30 mg, Per G Tube, Q AM  sodium chloride, 10 mL, Intravenous, Q12H  terazosin, 1 mg, Per G Tube, Nightly      Continuous Infusions:sodium chloride, 100 mL/hr, Last Rate: 100 mL/hr (24 3009)        Vital signs in last 24 hours:  Temp:  [97.2 °F (36.2 °C)-98.6 °F (37 °C)] 98.2 °F (36.8 °C)  Heart Rate:  [] 100  Resp:  [18-20] 20  BP: ()/(47-70) 103/47    Intake/Output:    Intake/Output Summary (Last 24 hours) at 2024 0934  Last data filed at 2024 0530  Gross per 24 hour   Intake --   Output 3375 ml   Net -3375 ml       Exam:  /47 (BP Location: Right arm, Patient Position: Lying)   Pulse 100   Temp 98.2 °F (36.8 °C) (Oral)   Resp 20   Ht 190.5 cm (75\")   Wt 63.1 kg (139 lb 1.6 oz)   SpO2 100%   BMI 17.39 kg/m²     General Appearance:  More alert and somewhat talkative, quite chronically ill with some clinical improvement noted over the last 24 hours                          head:    Normocephalic, bitemporal wasting, atraumatic                           Eyes:  Wearing shades                         Throat:   Oral mucosa pink and less dry                           Neck:   Supple, strap muscles easily seen, no JVD                         Lungs:    Diminished bases otherwise clear to auscultation bilaterally, respirations unlabored                 " "Chest Wall:    No tenderness or deformity -ribs outlined                          Heart:    Regular rate and rhythm, S1 and S2 normal                  Abdomen:     Soft, nontender, bowel sounds active, FT, Mariscal with sediment                 Extremities: Poor muscle mass throughout, no cyanosis or edema                        Pulses:   Pulses palpable in lower extremities                  Neurologic: Unable to fully evaluate      Data Review:  Labs in chart were reviewed.    Assessment:  Active Hospital Problems    Diagnosis  POA    **UTI (urinary tract infection), bacterial [N39.0, A49.9]  Unknown    ARF (acute renal failure) [N17.9]  Unknown    Failure to thrive in adult [R62.7]  Yes    Dysphagia [R13.10]  Yes    Generalized weakness [R53.1]  Yes    DISH (disseminated idiopathic skeletal hyperostosis) [M48.10]  Yes    Immobility [Z74.09]  Yes    Generalized pain [R52]  Yes    Hypertension [I10]  Yes      Resolved Hospital Problems   No resolved problems to display.       Plan:    ESBL UTI ESBL bacteremia/sepsis    -Urology consulted for moderate bilateral hydronephrosis/retention now with Mariscal-on terazosin with recommendations for ultrasound in the next couple days   -Renal also following given ARF -CRT 3.7-3.5.  Hyperkalemia resolved with a uric of 9.1 for multifactorial.  IVF running at 100 cc an hour with normal saline   -Repeat blood cultures in a.m. to demonstrate eradication      Failure to thrive pre-existing diagnosis of ESBL septicemia not ideal but patient has demonstrated some \"clinical improvement\"   -Chronic immobility also complicates -multiple skin tears and breakdown noted per wound care nurse present prior to admission      Dysphagia on modified diet with G-tube   -Speech following/checking VFSS-remains n.p.o.   -Prevacid   -If diet not advanced to nutrition and start dosing tube feeds soon   -Add prealbumin a.m. labs      SCDs for prophylaxis        Disposition -long-term prognosis very poor.  " Patient is making clinical improvement over the last 24 hours    Conor Balbuena MD  6/19/2024  09:34 EDT

## 2024-06-19 NOTE — PROGRESS NOTES
[facility] Progress Note      Patient Name: Anthony Gallegos  : 1945  MRN: [id number]  Primary Care Physician:  Keshav Eason MD  Date of admission: 2024    Subjective     Interval History:   Acute kidney injury on CKD 2.  Urine output 4.8 L after Mariscal placed.  Also on terazosin.  IV and oral intake not recorded.  Now on modified diet with honey thickened liquids.  Feel hungry.  Tells me he does not have a catheter in his bladder.    Review of Systems:   As noted above    Objective     Vitals:   Temp:  [97.7 °F (36.5 °C)-98.6 °F (37 °C)] 97.7 °F (36.5 °C)  Heart Rate:  [] 95  Resp:  [18-20] 18  BP: ()/(47-61) 119/61    Intake/Output Summary (Last 24 hours) at 2024 1557  Last data filed at 2024 0530  Gross per 24 hour   Intake --   Output 3375 ml   Net -3375 ml       Physical Exam:    General Appearance: Cachectic.  Confused.  Skin: warm and dry  HEENT: oral mucosa dry., nonicteric sclera.  Edentulous.  Neck: supple, no JVD  Lungs: Clear to auscultation bilaterally.  Heart: RRR, normal S1 and S2  Abdomen: soft, nontender, nondistended. +bs  : Mariscal catheter  Extremities: No edema.  No muscle mass.      Scheduled Meds:     [START ON 2024] ertapenem, 1,000 mg, Intravenous, Q24H  lansoprazole, 30 mg, Per G Tube, Q AM  sodium chloride, 10 mL, Intravenous, Q12H  terazosin, 1 mg, Per G Tube, Nightly      IV Meds:   sodium chloride, 100 mL/hr, Last Rate: 100 mL/hr (24 4623)        Results Reviewed:   I have personally reviewed the results from the time of this admission to 2024 15:57 EDT     Results from last 7 days   Lab Units 24  0925 24  0711 24  0033   SODIUM mmol/L 142 134* 130*   POTASSIUM mmol/L 3.8 4.9 5.7*   CHLORIDE mmol/L 110* 99 96*   CO2 mmol/L 20.3* 20.5* 20.5*   BUN mg/dL 80* 89* 103*   CREATININE mg/dL 1.63* 3.57* 3.75*   CALCIUM mg/dL 8.5* 8.5* 9.1   BILIRUBIN mg/dL  --   --  0.6   ALK PHOS U/L  --   --   78   ALT (SGPT) U/L  --   --  25   AST (SGOT) U/L  --   --  34   GLUCOSE mg/dL 76 94 111*       Estimated Creatinine Clearance: 32.3 mL/min (A) (by C-G formula based on SCr of 1.63 mg/dL (H)).    Results from last 7 days   Lab Units 06/19/24  0925 06/18/24  0033   MAGNESIUM mg/dL 2.4 2.5*   PHOSPHORUS mg/dL 3.3  --        Results from last 7 days   Lab Units 06/19/24  0925 06/18/24  0711   URIC ACID mg/dL 9.4* 9.1*       Results from last 7 days   Lab Units 06/19/24  0925 06/18/24  0711 06/18/24  0033   WBC 10*3/mm3 7.93 5.17 5.95   HEMOGLOBIN g/dL 9.0* 10.4* 10.5*   PLATELETS 10*3/mm3 264 248 281             Assessment / Plan     ASSESSMENT:  Acute kidney injury on CKD 2.  Very poor muscle mass.  Likely due to obstruction.  Bilateral hydronephrosis and urinary retention.  Now with Mariscal catheter and on terazosin.  Significant improvement with Mariscal catheter placement.  Creatinine down to 1.63 and azotemia improved to 80.  Obstructive diuresis.  2.  ESBL urinary tract infection, ESBL bacteremia.  3.  Failure to thrive.  4.  Dysphagia on modified diet.  Has PEG tube but not currently on tube feeds.  Albumin 2.4.  Prealbumin ordered for the morning.  5.  Anemia  PLAN:  Continue IV fluids  Discussed with family at bedside.    Thank you for involving us in the care of Anthony Gallegos.  Please feel free to call with any questions.    Tosha Cummings MD  06/19/24  15:57 EDT    [facility]  [phone]    Please note that portions of this note were completed with a voice recognition program.

## 2024-06-20 LAB
ALBUMIN SERPL-MCNC: 2.6 G/DL (ref 3.5–5.2)
ANION GAP SERPL CALCULATED.3IONS-SCNC: 8 MMOL/L (ref 5–15)
BUN SERPL-MCNC: 61 MG/DL (ref 8–23)
BUN/CREAT SERPL: 63.5 (ref 7–25)
CALCIUM SPEC-SCNC: 8.3 MG/DL (ref 8.6–10.5)
CHLORIDE SERPL-SCNC: 115 MMOL/L (ref 98–107)
CO2 SERPL-SCNC: 26 MMOL/L (ref 22–29)
CREAT SERPL-MCNC: 0.96 MG/DL (ref 0.76–1.27)
DEPRECATED RDW RBC AUTO: 43.6 FL (ref 37–54)
EGFRCR SERPLBLD CKD-EPI 2021: 79.9 ML/MIN/1.73
ERYTHROCYTE [DISTWIDTH] IN BLOOD BY AUTOMATED COUNT: 14.8 % (ref 12.3–15.4)
GLUCOSE SERPL-MCNC: 75 MG/DL (ref 65–99)
HCT VFR BLD AUTO: 28 % (ref 37.5–51)
HGB BLD-MCNC: 9.5 G/DL (ref 13–17.7)
MAGNESIUM SERPL-MCNC: 2.3 MG/DL (ref 1.6–2.4)
MCH RBC QN AUTO: 27.5 PG (ref 26.6–33)
MCHC RBC AUTO-ENTMCNC: 33.9 G/DL (ref 31.5–35.7)
MCV RBC AUTO: 80.9 FL (ref 79–97)
PHOSPHATE SERPL-MCNC: 2.2 MG/DL (ref 2.5–4.5)
PLATELET # BLD AUTO: 300 10*3/MM3 (ref 140–450)
PMV BLD AUTO: 8.4 FL (ref 6–12)
POTASSIUM SERPL-SCNC: 3.4 MMOL/L (ref 3.5–5.2)
PREALB SERPL-MCNC: 5.1 MG/DL (ref 20–40)
RBC # BLD AUTO: 3.46 10*6/MM3 (ref 4.14–5.8)
SODIUM SERPL-SCNC: 149 MMOL/L (ref 136–145)
URATE SERPL-MCNC: 9.7 MG/DL (ref 3.4–7)
WBC NRBC COR # BLD AUTO: 8.36 10*3/MM3 (ref 3.4–10.8)

## 2024-06-20 PROCEDURE — 84550 ASSAY OF BLOOD/URIC ACID: CPT | Performed by: HOSPITALIST

## 2024-06-20 PROCEDURE — 84134 ASSAY OF PREALBUMIN: CPT | Performed by: HOSPITALIST

## 2024-06-20 PROCEDURE — 83735 ASSAY OF MAGNESIUM: CPT | Performed by: INTERNAL MEDICINE

## 2024-06-20 PROCEDURE — 25010000002 ERTAPENEM PER 500 MG: Performed by: HOSPITALIST

## 2024-06-20 PROCEDURE — 85027 COMPLETE CBC AUTOMATED: CPT | Performed by: HOSPITALIST

## 2024-06-20 PROCEDURE — 25810000003 SODIUM CHLORIDE 0.9 % SOLUTION: Performed by: INTERNAL MEDICINE

## 2024-06-20 PROCEDURE — 80069 RENAL FUNCTION PANEL: CPT | Performed by: INTERNAL MEDICINE

## 2024-06-20 RX ORDER — POTASSIUM CHLORIDE 1.5 G/1.58G
40 POWDER, FOR SOLUTION ORAL ONCE
Status: DISCONTINUED | OUTPATIENT
Start: 2024-06-20 | End: 2024-06-25

## 2024-06-20 RX ORDER — POTASSIUM CHLORIDE 1.5 G/1.58G
40 POWDER, FOR SOLUTION ORAL 2 TIMES DAILY
Status: COMPLETED | OUTPATIENT
Start: 2024-06-20 | End: 2024-06-20

## 2024-06-20 RX ADMIN — LANSOPRAZOLE 30 MG: 15 TABLET, ORALLY DISINTEGRATING ORAL at 08:45

## 2024-06-20 RX ADMIN — SODIUM CHLORIDE 125 ML/HR: 9 INJECTION, SOLUTION INTRAVENOUS at 02:15

## 2024-06-20 RX ADMIN — Medication 10 ML: at 20:39

## 2024-06-20 RX ADMIN — POTASSIUM CHLORIDE 40 MEQ: 1.5 POWDER, FOR SOLUTION ORAL at 20:36

## 2024-06-20 RX ADMIN — POTASSIUM CHLORIDE 40 MEQ: 1.5 POWDER, FOR SOLUTION ORAL at 11:57

## 2024-06-20 RX ADMIN — ERTAPENEM SODIUM 1000 MG: 1 INJECTION, POWDER, LYOPHILIZED, FOR SOLUTION INTRAMUSCULAR; INTRAVENOUS at 11:56

## 2024-06-20 RX ADMIN — SODIUM CHLORIDE 125 ML/HR: 9 INJECTION, SOLUTION INTRAVENOUS at 08:48

## 2024-06-20 NOTE — PROGRESS NOTES
"    DAILY PROGRESS NOTE  [facility]    Patient Identification:  Name: Anthony Gallegos  Age: 79 y.o.  Sex: male  :  1945  MRN: [id number]         Primary Care Physician: Keshav Eason MD    Subjective:  Interval History: Much more alert and awake and animated.  Eating better.  Case discussed with managing RN.  Patient does have past history is of dementia so is still not the best of historians but obviously clinically much better    Objective: Chronically ill/FTT but acutely better    Scheduled Meds:ertapenem, 1,000 mg, Intravenous, Q24H  lansoprazole, 30 mg, Per G Tube, Q AM  potassium chloride, 40 mEq, Oral, BID  sodium chloride, 10 mL, Intravenous, Q12H  terazosin, 1 mg, Per G Tube, Nightly      Continuous Infusions:     Vital signs in last 24 hours:  Temp:  [97.5 °F (36.4 °C)-97.7 °F (36.5 °C)] 97.5 °F (36.4 °C)  Heart Rate:  [] 96  Resp:  [16-20] 20  BP: (111-153)/(49-67) 153/67    Intake/Output:    Intake/Output Summary (Last 24 hours) at 2024 1126  Last data filed at 2024 0630  Gross per 24 hour   Intake 5441 ml   Output 1850 ml   Net 3591 ml       Exam:  /67 (BP Location: Right arm, Patient Position: Lying)   Pulse 96   Temp 97.5 °F (36.4 °C) (Oral)   Resp 20   Ht 190.5 cm (75\")   Wt 65.4 kg (144 lb 2.9 oz)   SpO2 98%   BMI 18.02 kg/m²     General Appearance:  More alert and animated, cooperative                         Throat:   Oral mucosa pink and moist                           Neck:   No JVD                         Lungs:    Clear to auscultation bilaterally, respirations unlabored                          Heart:    Regular rate and rhythm, S1 and S2 normal                  Abdomen:     Soft, nontender, bowel sounds active, Mariscal seems new w no sediment, FT                 Extremities: Chronic immobility/poor tone but nothing pitting                        Pulses:   Pulses palpable in all extremities                                Data Review:  Labs " in chart were reviewed.    Assessment:  Active Hospital Problems    Diagnosis  POA    **UTI (urinary tract infection), bacterial [N39.0, A49.9]  Unknown    ARF (acute renal failure) [N17.9]  Unknown    Failure to thrive in adult [R62.7]  Yes    Dysphagia [R13.10]  Yes    Generalized weakness [R53.1]  Yes    DISH (disseminated idiopathic skeletal hyperostosis) [M48.10]  Yes    Immobility [Z74.09]  Yes    Generalized pain [R52]  Yes    Hypertension [I10]  Yes      Resolved Hospital Problems   No resolved problems to display.       Plan:    Dramatic clinical improvement over the last 24 hours as patient is sitting up and more animated and interactive    ESBL septicemia secondary to ESBL UTI   -Invanz D3   -Repeat blood cultures pending to demonstrate eradication    ARF secondary to sepsis-resolved   -CKD 2 per renal   -IVF DC'd per renal   -Hyperkalemia resolved   -Hyponatremia/hypokalemia with a uric of 9.7 -40 mill equivalents of K   -Repeat BMP in a.m.    Failure to thrive with chronic immobility and a Pree albumin of 5 which is a very poor overall long-term clinical prognosis    Dysphagia with modified diet and with G-tube   -Speech check and VFSS   -Prevacid    SCDs for prophylaxis      Disposition -[facility] coordinating return to long-term care at [facility] when medically ready   -PICC will be ordered likely tomorrow to complete 7 days of Invanz    Conor Balbuena MD  6/20/2024  11:26 EDT  \

## 2024-06-20 NOTE — PROGRESS NOTES
[facility] Progress Note      Patient Name: Anthony Gallegos  : 1945  MRN: [id number]  Primary Care Physician:  Keshav Eason MD  Date of admission: 2024    Subjective     Interval History:   Acute kidney injury on CKD 2.  Sitting up eating breakfast.  Urine output 2.9 L.  5 L in including IV fluids.  Denies pain.  Review of Systems:   As noted above    Objective     Vitals:   Temp:  [97.5 °F (36.4 °C)-97.7 °F (36.5 °C)] 97.5 °F (36.4 °C)  Heart Rate:  [] 96  Resp:  [16-20] 20  BP: (111-153)/(49-67) 153/67    Intake/Output Summary (Last 24 hours) at 2024 1057  Last data filed at 2024 0630  Gross per 24 hour   Intake 5441 ml   Output 2950 ml   Net 2491 ml       Physical Exam:    General Appearance: Cachectic.  Eating breakfast.  More interactive today.  Voice a little stronger.  Skin: warm and dry  HEENT: oral mucosa dry., nonicteric sclera.  Edentulous.  Neck: supple, no JVD  Lungs: Clear to auscultation bilaterally.  Heart: RRR, normal S1 and S2  Abdomen: soft, nontender, nondistended. +bs  : Mariscal catheter  Extremities: No edema.  No muscle mass.      Scheduled Meds:     ertapenem, 1,000 mg, Intravenous, Q24H  lansoprazole, 30 mg, Per G Tube, Q AM  potassium chloride, 40 mEq, Oral, BID  sodium chloride, 10 mL, Intravenous, Q12H  terazosin, 1 mg, Per G Tube, Nightly      IV Meds:          Results Reviewed:   I have personally reviewed the results from the time of this admission to 2024 10:57 EDT     Results from last 7 days   Lab Units 24  0601 24  0925 24  0711 24  0033   SODIUM mmol/L 149* 142 134* 130*   POTASSIUM mmol/L 3.4* 3.8 4.9 5.7*   CHLORIDE mmol/L 115* 110* 99 96*   CO2 mmol/L 26.0 20.3* 20.5* 20.5*   BUN mg/dL 61* 80* 89* 103*   CREATININE mg/dL 0.96 1.63* 3.57* 3.75*   CALCIUM mg/dL 8.3* 8.5* 8.5* 9.1   BILIRUBIN mg/dL  --   --   --  0.6   ALK PHOS U/L  --   --   --  78   ALT (SGPT) U/L  --   --   --  25   AST  (SGOT) U/L  --   --   --  34   GLUCOSE mg/dL 75 76 94 111*       Estimated Creatinine Clearance: 56.8 mL/min (by C-G formula based on SCr of 0.96 mg/dL).    Results from last 7 days   Lab Units 06/20/24  0601 06/19/24  0925 06/18/24  0033   MAGNESIUM mg/dL 2.3 2.4 2.5*   PHOSPHORUS mg/dL 2.2* 3.3  --        Results from last 7 days   Lab Units 06/20/24  0601 06/19/24  0925 06/18/24  0711   URIC ACID mg/dL 9.7* 9.4* 9.1*       Results from last 7 days   Lab Units 06/20/24  0601 06/19/24  0925 06/18/24  0711 06/18/24  0033   WBC 10*3/mm3 8.36 7.93 5.17 5.95   HEMOGLOBIN g/dL 9.5* 9.0* 10.4* 10.5*   PLATELETS 10*3/mm3 300 264 248 281             Assessment / Plan     ASSESSMENT:  Acute kidney injury on CKD 2.  Very poor muscle mass.  Likely due to obstruction.  Bilateral hydronephrosis and urinary retention.  Now with Mariscal catheter and on terazosin.  Significant improvement with Mariscal catheter placement.  Creatinine down to 1.63 and azotemia improved to 80.  Obstructive diuresis.  Hypernatremic on IV normal saline.  Discontinue IV fluids since he is eating.  2.  ESBL urinary tract infection, ESBL bacteremia.  On ertapenem.  3.  Failure to thrive.  4.  Dysphagia on modified diet.  Has PEG tube but not currently on tube feeds.  Albumin 2.6.  Prealbumin 5.1.  5.  Anemia  PLAN:  Discontinue IV fluids  Encourage p.o. fluids.    Thank you for involving us in the care of Anthony Gallegos.  Please feel free to call with any questions.    Tosha Cummings MD  06/20/24  10:57 EDT    [facility]  [phone]    Please note that portions of this note were completed with a voice recognition program.  Copied portions of the note reviewed and accurate as of 6/20/2024.

## 2024-06-20 NOTE — PLAN OF CARE
Problem: Adult Inpatient Plan of Care  Goal: Plan of Care Review  Outcome: Ongoing, Progressing  Flowsheets (Taken 6/20/2024 0447)  Progress: no change  Plan of Care Reviewed With: patient  Patient slept overnight. A&O x3. Confused to time. Q 2 turn. VSS. Remains on RA. IVFs continue. All needs met.

## 2024-06-21 ENCOUNTER — APPOINTMENT (OUTPATIENT)
Dept: ULTRASOUND IMAGING | Facility: HOSPITAL | Age: 79
End: 2024-06-21
Payer: MEDICARE

## 2024-06-21 LAB
ALBUMIN SERPL-MCNC: 2.7 G/DL (ref 3.5–5.2)
ANION GAP SERPL CALCULATED.3IONS-SCNC: 9.3 MMOL/L (ref 5–15)
BACTERIA SPEC AEROBE CULT: ABNORMAL
BUN SERPL-MCNC: 46 MG/DL (ref 8–23)
BUN/CREAT SERPL: 56.1 (ref 7–25)
CALCIUM SPEC-SCNC: 8.6 MG/DL (ref 8.6–10.5)
CHLORIDE SERPL-SCNC: 122 MMOL/L (ref 98–107)
CO2 SERPL-SCNC: 23.7 MMOL/L (ref 22–29)
CREAT SERPL-MCNC: 0.82 MG/DL (ref 0.76–1.27)
EGFRCR SERPLBLD CKD-EPI 2021: 88.8 ML/MIN/1.73
FERRITIN SERPL-MCNC: 1033 NG/ML (ref 30–400)
GLUCOSE SERPL-MCNC: 97 MG/DL (ref 65–99)
IRON 24H UR-MRATE: 25 MCG/DL (ref 59–158)
IRON SATN MFR SERPL: 13 % (ref 20–50)
PHOSPHATE SERPL-MCNC: 2.2 MG/DL (ref 2.5–4.5)
POTASSIUM SERPL-SCNC: 3.7 MMOL/L (ref 3.5–5.2)
SODIUM SERPL-SCNC: 155 MMOL/L (ref 136–145)
TIBC SERPL-MCNC: 186 MCG/DL (ref 298–536)
TRANSFERRIN SERPL-MCNC: 125 MG/DL (ref 200–360)
URATE SERPL-MCNC: 8.7 MG/DL (ref 3.4–7)

## 2024-06-21 PROCEDURE — 84466 ASSAY OF TRANSFERRIN: CPT | Performed by: STUDENT IN AN ORGANIZED HEALTH CARE EDUCATION/TRAINING PROGRAM

## 2024-06-21 PROCEDURE — 80069 RENAL FUNCTION PANEL: CPT | Performed by: INTERNAL MEDICINE

## 2024-06-21 PROCEDURE — 84550 ASSAY OF BLOOD/URIC ACID: CPT | Performed by: HOSPITALIST

## 2024-06-21 PROCEDURE — 25010000002 ENOXAPARIN PER 10 MG: Performed by: STUDENT IN AN ORGANIZED HEALTH CARE EDUCATION/TRAINING PROGRAM

## 2024-06-21 PROCEDURE — 0 DEXTROSE 5 % SOLUTION: Performed by: INTERNAL MEDICINE

## 2024-06-21 PROCEDURE — 82728 ASSAY OF FERRITIN: CPT | Performed by: STUDENT IN AN ORGANIZED HEALTH CARE EDUCATION/TRAINING PROGRAM

## 2024-06-21 PROCEDURE — 76775 US EXAM ABDO BACK WALL LIM: CPT

## 2024-06-21 PROCEDURE — 25010000002 ERTAPENEM PER 500 MG: Performed by: HOSPITALIST

## 2024-06-21 PROCEDURE — 83540 ASSAY OF IRON: CPT | Performed by: STUDENT IN AN ORGANIZED HEALTH CARE EDUCATION/TRAINING PROGRAM

## 2024-06-21 RX ORDER — DEXTROSE MONOHYDRATE 50 MG/ML
75 INJECTION, SOLUTION INTRAVENOUS CONTINUOUS
Status: DISCONTINUED | OUTPATIENT
Start: 2024-06-21 | End: 2024-06-24

## 2024-06-21 RX ORDER — ENOXAPARIN SODIUM 100 MG/ML
40 INJECTION SUBCUTANEOUS EVERY 24 HOURS
Status: DISCONTINUED | OUTPATIENT
Start: 2024-06-21 | End: 2024-06-28 | Stop reason: HOSPADM

## 2024-06-21 RX ADMIN — TERAZOSIN 1 MG: 1 CAPSULE ORAL at 20:54

## 2024-06-21 RX ADMIN — LANSOPRAZOLE 30 MG: 15 TABLET, ORALLY DISINTEGRATING ORAL at 06:38

## 2024-06-21 RX ADMIN — ENOXAPARIN SODIUM 40 MG: 100 INJECTION SUBCUTANEOUS at 15:31

## 2024-06-21 RX ADMIN — DEXTROSE MONOHYDRATE 125 ML/HR: 50 INJECTION, SOLUTION INTRAVENOUS at 09:49

## 2024-06-21 RX ADMIN — ERTAPENEM SODIUM 1000 MG: 1 INJECTION, POWDER, LYOPHILIZED, FOR SOLUTION INTRAMUSCULAR; INTRAVENOUS at 11:56

## 2024-06-21 RX ADMIN — MUPIROCIN 1 APPLICATION: 20 OINTMENT TOPICAL at 19:29

## 2024-06-21 RX ADMIN — DEXTROSE MONOHYDRATE 125 ML/HR: 50 INJECTION, SOLUTION INTRAVENOUS at 19:33

## 2024-06-21 NOTE — NURSING NOTE
Wound/Ostomy: We see patient at the request of the floor nurse regarding skin issue in Penis . Patient  is alert, upon assessment is observed slight abrasion on the penis, which may be related to the Mariscal catheter, Bactroban ointment was ordered.  Please re-consult for any additional needs.

## 2024-06-21 NOTE — PLAN OF CARE
Goal Outcome Evaluation:  Plan of Care Reviewed With: patient        Progress: no change  Outcome Evaluation: vitals stable.  pt denied pain and did not appear to be in any pain.  meds given per orders.  turn q 2  hours.  audra care as needed.  forde catheter and PEG tube in place.  isolation precautions maintained.  wound care completed per orders.  wound consult placed.  will continue to monitor.

## 2024-06-21 NOTE — PLAN OF CARE
Goal Outcome Evaluation:      Patient alert and oriented x1, VSS on room air. Patient receiving Dextrose 5% at 125cc. Diagnosed with a UTI receiving Ertapenem Q24H intravenously. Renal ultrasound results pending. Wound care and Mariscal care completed. Remains in contact isolation. Increased rounding.

## 2024-06-21 NOTE — PROGRESS NOTES
"[facility] Clinical Pharmacy Services: Enoxaparin Consult    Anthony Gallegos has a pharmacy consult to dose prophylactic enoxaparin per Dr. Townsend's request.     Indication: VTE Prophylaxis  Home Anticoagulation: none     Relevant clinical data and objective history reviewed:  79 y.o. male 190.5 cm (75\") 62 kg (136 lb 11 oz)   Body mass index is 17.08 kg/m².   Results from last 7 days   Lab Units 06/20/24  0601   PLATELETS 10*3/mm3 300     Estimated Creatinine Clearance: 63 mL/min (by C-G formula based on SCr of 0.82 mg/dL).    Assessment/Plan    Will start patient on 40mg subcutaneous every 24 hours, adjusted for renal function. Consult order will be discontinued but pharmacy will continue to follow.     Mireya Agarwal, PharmD  Clinical Pharmacist     "

## 2024-06-21 NOTE — PROGRESS NOTES
Name: Anthony Gallegos ADMIT: 2024   : 1945  PCP: Keshav Eason MD    MRN: [id number] LOS: 3 days   AGE/SEX: 79 y.o. male  ROOM: [facility]     Subjective   Subjective     No events overnight. He's somewhat confused. Sodium is up to 155 today       Objective   Objective   Vital Signs  Temp:  [96.4 °F (35.8 °C)-98.1 °F (36.7 °C)] 96.4 °F (35.8 °C)  Heart Rate:  [] 99  Resp:  [18-20] 18  BP: (113-130)/(59-71) 124/63  SpO2:  [94 %-100 %] 94 %  on   ;   Device (Oxygen Therapy): room air  Body mass index is 17.08 kg/m².  Physical Exam  Constitutional:       General: He is not in acute distress.     Appearance: He is ill-appearing. He is not toxic-appearing.   Cardiovascular:      Rate and Rhythm: Normal rate and regular rhythm.      Heart sounds: Normal heart sounds.   Pulmonary:      Effort: Pulmonary effort is normal.      Breath sounds: Normal breath sounds.   Abdominal:      General: Bowel sounds are normal.      Palpations: Abdomen is soft.      Comments: PEG   Musculoskeletal:         General: No tenderness.      Right lower leg: No edema.      Left lower leg: No edema.   Neurological:      General: No focal deficit present.      Mental Status: He is alert. He is disoriented.   Psychiatric:         Mood and Affect: Mood normal.         Behavior: Behavior normal.         Results Review     I reviewed the patient's new clinical results.  Results from last 7 days   Lab Units 24  0601 24  0925 24  0711 24  0033   WBC 10*3/mm3 8.36 7.93 5.17 5.95   HEMOGLOBIN g/dL 9.5* 9.0* 10.4* 10.5*   PLATELETS 10*3/mm3 300 264 248 281     Results from last 7 days   Lab Units 24  0434 24  0601 24  0925 24  0711   SODIUM mmol/L 155* 149* 142 134*   POTASSIUM mmol/L 3.7 3.4* 3.8 4.9   CHLORIDE mmol/L 122* 115* 110* 99   CO2 mmol/L 23.7 26.0 20.3* 20.5*   BUN mg/dL 46* 61* 80* 89*   CREATININE mg/dL 0.82 0.96 1.63* 3.57*   GLUCOSE mg/dL 97 75 76 94   Estimated  "Creatinine Clearance: 63 mL/min (by C-G formula based on SCr of 0.82 mg/dL).  Results from last 7 days   Lab Units 06/21/24  0434 06/20/24  0601 06/19/24  0925 06/18/24  0033   ALBUMIN g/dL 2.7* 2.6* 2.4* 2.9*   BILIRUBIN mg/dL  --   --   --  0.6   ALK PHOS U/L  --   --   --  78   AST (SGOT) U/L  --   --   --  34   ALT (SGPT) U/L  --   --   --  25     Results from last 7 days   Lab Units 06/21/24  0434 06/20/24  0601 06/19/24  0925 06/18/24  0711 06/18/24  0033   CALCIUM mg/dL 8.6 8.3* 8.5* 8.5* 9.1   ALBUMIN g/dL 2.7* 2.6* 2.4*  --  2.9*   MAGNESIUM mg/dL  --  2.3 2.4  --  2.5*   PHOSPHORUS mg/dL 2.2* 2.2* 3.3  --   --      Results from last 7 days   Lab Units 06/18/24  0033   PROCALCITONIN ng/mL 21.30*   LACTATE mmol/L 2.0     COVID19   Date Value Ref Range Status   03/25/2024 Not Detected Not Detected - Ref. Range Final     No results found for: \"HGBA1C\", \"POCGLU\"    FL Video Swallow Single Contrast  VIDEO ESOPHAGRAM     HISTORY: Dysphagia.     The patient exhibits moderate oropharyngeal dysphagia with reduced  pharyngeal constriction and reduced airway protection. There was some  penetration and trace aspiration occurring with cup sips of thin  liquids. This did not occur with pureed or honey consistencies. Please  also see the speech therapist report.     14 imaging sequences were obtained and the fluoroscopy time measures 3  minutes and 37 seconds. The dose Kerma measures 12 mGy.     This report was finalized on 6/20/2024 3:37 PM by Dr. Roger Dyson M.D  on Workstation: [id number]       Scheduled Medications  ertapenem, 1,000 mg, Intravenous, Q24H  lansoprazole, 30 mg, Per G Tube, Q AM  mupirocin, 1 Application, Topical, Q12H  potassium chloride, 40 mEq, Per G Tube, Once  sodium chloride, 10 mL, Intravenous, Q12H  terazosin, 1 mg, Per G Tube, Nightly    Infusions  dextrose, 125 mL/hr, Last Rate: 125 mL/hr (06/21/24 0949)    Diet  Diet: Regular/House; Texture: Pureed (NDD 1); Fluid Consistency: Honey " Thick       Assessment/Plan     Active Hospital Problems    Diagnosis  POA    **UTI (urinary tract infection), bacterial [N39.0, A49.9]  Unknown    ARF (acute renal failure) [N17.9]  Unknown    Failure to thrive in adult [R62.7]  Yes    Dysphagia [R13.10]  Yes    Generalized weakness [R53.1]  Yes    DISH (disseminated idiopathic skeletal hyperostosis) [M48.10]  Yes    Immobility [Z74.09]  Yes    Generalized pain [R52]  Yes    Hypertension [I10]  Yes      Resolved Hospital Problems   No resolved problems to display.       79 y.o. male admitted with UTI (urinary tract infection), bacterial.    ESBL E coli UTI and bacteremia-on ertapenem. Anticipate a 7-14 day course of IV therapy. Follow repeat blood cultures.  MARTITA-likely predominantly due to obstructive uropathy. Resolved.  Urinary retention causing bilateral hydronephrosis and MARTITA-he had recently had his long term forde removed prior to discharge. This has been replaced. Urology recommends keeping it until outpatient follow up. Check renal ultrasound per urology recommendations to ensure resolution of hydronephrosis. Continue terazosin   Hypernatremia-on d5w per nephrology  Metabolic encephalopathy-likely related to some combination of the above  Anemia-b12 was fine in march. He's not a candidate for iv iron therapy with his bacteremia, but I'll go ahead and update his iron studies and folic acid levels.  Chronic dysphagia-on a modified diet. Has a PEG, but isn't currently on tube feeds.   Failure to thrive/severe malnutrition-bmp 17. Prealbumin 5.1.  GERD-ppi   Ankylosing spondylitis-on methotrexate as an outpatient  Pharmacy to dose Lovenox for DVT prophylaxis.  Full code.  Discussed with patient and family.  Anticipate discharge  return to [facility]  timing yet to be determined.      Gerson Townsend MD  [facility]  06/21/24  13:41 EDT    I wore protective equipment throughout this patient encounter including a face mask, gloves and  protective eyewear.  Hand hygiene was performed before donning protective equipment and after removal when leaving the room.

## 2024-06-21 NOTE — PROGRESS NOTES
[facility] Progress Note      Patient Name: Anthony Gallegos  : 1945  MRN: [id number]  Primary Care Physician:  Keshav Esaon MD  Date of admission: 2024    Subjective     Interval History:   Acute kidney injury on CKD 2.      The patient appears to be confused, denies chest pain or shortness of air, no orthopnea or PND, no nausea or vomiting.  Urine output in the past 24 hours was 3705 cc negative fluid balance since admission 4.8 L    Review of Systems:   As noted above    Objective     Vitals:   Temp:  [96.8 °F (36 °C)-98.1 °F (36.7 °C)] 96.8 °F (36 °C)  Heart Rate:  [101-110] 110  Resp:  [18-20] 20  BP: (113-136)/() 113/67    Intake/Output Summary (Last 24 hours) at 2024 0827  Last data filed at 2024 0640  Gross per 24 hour   Intake 608 ml   Output 3705 ml   Net -3097 ml       Physical Exam:    General Appearance: Cachectic.  No acute distress, chronically ill, confused stronger.  Skin: warm and dry  HEENT: oral mucosa dry., nonicteric sclera.  Edentulous.  Neck: No JVD  Lungs: Bilateral rhonchi, unlabored breathing effort  Heart: RRR, no rub  Abdomen: soft, nontender, nondistended.  Normoactive bowel  : Mariscal catheter  Extremities: No edema.  No muscle mass.      Scheduled Meds:     ertapenem, 1,000 mg, Intravenous, Q24H  lansoprazole, 30 mg, Per G Tube, Q AM  potassium chloride, 40 mEq, Per G Tube, Once  sodium chloride, 10 mL, Intravenous, Q12H  terazosin, 1 mg, Per G Tube, Nightly      IV Meds:          Results Reviewed:   I have personally reviewed the results from the time of this admission to 2024 08:27 EDT     Results from last 7 days   Lab Units 24  0434 24  0601 24  0925 24  0711 24  0033   SODIUM mmol/L 155* 149* 142   < > 130*   POTASSIUM mmol/L 3.7 3.4* 3.8   < > 5.7*   CHLORIDE mmol/L 122* 115* 110*   < > 96*   CO2 mmol/L 23.7 26.0 20.3*   < > 20.5*   BUN mg/dL 46* 61* 80*   < > 103*   CREATININE mg/dL  0.82 0.96 1.63*   < > 3.75*   CALCIUM mg/dL 8.6 8.3* 8.5*   < > 9.1   BILIRUBIN mg/dL  --   --   --   --  0.6   ALK PHOS U/L  --   --   --   --  78   ALT (SGPT) U/L  --   --   --   --  25   AST (SGOT) U/L  --   --   --   --  34   GLUCOSE mg/dL 97 75 76   < > 111*    < > = values in this interval not displayed.       Estimated Creatinine Clearance: 63 mL/min (by C-G formula based on SCr of 0.82 mg/dL).    Results from last 7 days   Lab Units 06/21/24  0434 06/20/24  0601 06/19/24  0925 06/18/24  0033   MAGNESIUM mg/dL  --  2.3 2.4 2.5*   PHOSPHORUS mg/dL 2.2* 2.2* 3.3  --        Results from last 7 days   Lab Units 06/21/24  0434 06/20/24  0601 06/19/24  0925 06/18/24  0711   URIC ACID mg/dL 8.7* 9.7* 9.4* 9.1*       Results from last 7 days   Lab Units 06/20/24  0601 06/19/24  0925 06/18/24  0711 06/18/24  0033   WBC 10*3/mm3 8.36 7.93 5.17 5.95   HEMOGLOBIN g/dL 9.5* 9.0* 10.4* 10.5*   PLATELETS 10*3/mm3 300 264 248 281             Assessment / Plan     ASSESSMENT:  Acute kidney injury on CKD 2.  Very poor muscle mass.  Likely due to obstruction.  Bilateral hydronephrosis and urinary retention.  Now with Mariscal catheter and on terazosin.  Significant improvement with Mariscal catheter placement.  Creatinine down to 0.82, BUN 46 all improving, phosphorus 2.2 no need for replacement, sodium up to 155   2.  ESBL urinary tract infection, ESBL bacteremia.  On ertapenem.  3.  Failure to thrive.  4.  Dysphagia on modified diet.  Has PEG tube but not currently on tube feeds.  Albumin 2.7.    5.  Anemia, hemoglobin 9.5  6.  Hypernatremia with significant dehydration.    PLAN:  IV fluids, D5W to replete free water deficit  Surveillance  labs    I reviewed the chart and other providers notes, reviewed labs.  Copied text in this note has been reviewed and is accurate as of 06/21/24.       Thank you for involving us in the care of Anthony Gallegos.  Please feel free to call with any questions.    Thompson Novoa,  MD  06/21/24  08:27 EDT    [facility]  [phone]    Please note that portions of this note were completed with a voice recognition program.  Copied portions of the note reviewed and accurate as of 6/20/2024.

## 2024-06-22 LAB
ALBUMIN SERPL-MCNC: 2.5 G/DL (ref 3.5–5.2)
ANION GAP SERPL CALCULATED.3IONS-SCNC: 7 MMOL/L (ref 5–15)
BASOPHILS # BLD AUTO: 0.04 10*3/MM3 (ref 0–0.2)
BASOPHILS NFR BLD AUTO: 0.6 % (ref 0–1.5)
BUN SERPL-MCNC: 31 MG/DL (ref 8–23)
BUN/CREAT SERPL: 47.7 (ref 7–25)
CALCIUM SPEC-SCNC: 10.1 MG/DL (ref 8.6–10.5)
CHLORIDE SERPL-SCNC: 123 MMOL/L (ref 98–107)
CO2 SERPL-SCNC: 23 MMOL/L (ref 22–29)
CREAT SERPL-MCNC: 0.65 MG/DL (ref 0.76–1.27)
DEPRECATED RDW RBC AUTO: 45.2 FL (ref 37–54)
EGFRCR SERPLBLD CKD-EPI 2021: 95.3 ML/MIN/1.73
EOSINOPHIL # BLD AUTO: 0.35 10*3/MM3 (ref 0–0.4)
EOSINOPHIL NFR BLD AUTO: 4.8 % (ref 0.3–6.2)
ERYTHROCYTE [DISTWIDTH] IN BLOOD BY AUTOMATED COUNT: 14.6 % (ref 12.3–15.4)
FOLATE SERPL-MCNC: 13.2 NG/ML (ref 4.78–24.2)
GLUCOSE SERPL-MCNC: 109 MG/DL (ref 65–99)
HCT VFR BLD AUTO: 27.9 % (ref 37.5–51)
HGB BLD-MCNC: 8.9 G/DL (ref 13–17.7)
IMM GRANULOCYTES # BLD AUTO: 0.05 10*3/MM3 (ref 0–0.05)
IMM GRANULOCYTES NFR BLD AUTO: 0.7 % (ref 0–0.5)
LYMPHOCYTES # BLD AUTO: 1.34 10*3/MM3 (ref 0.7–3.1)
LYMPHOCYTES NFR BLD AUTO: 18.6 % (ref 19.6–45.3)
MAGNESIUM SERPL-MCNC: 1.9 MG/DL (ref 1.6–2.4)
MCH RBC QN AUTO: 27.1 PG (ref 26.6–33)
MCHC RBC AUTO-ENTMCNC: 31.9 G/DL (ref 31.5–35.7)
MCV RBC AUTO: 84.8 FL (ref 79–97)
MONOCYTES # BLD AUTO: 0.63 10*3/MM3 (ref 0.1–0.9)
MONOCYTES NFR BLD AUTO: 8.7 % (ref 5–12)
NEUTROPHILS NFR BLD AUTO: 4.81 10*3/MM3 (ref 1.7–7)
NEUTROPHILS NFR BLD AUTO: 66.6 % (ref 42.7–76)
NRBC BLD AUTO-RTO: 0 /100 WBC (ref 0–0.2)
PHOSPHATE SERPL-MCNC: 2.3 MG/DL (ref 2.5–4.5)
PLATELET # BLD AUTO: 317 10*3/MM3 (ref 140–450)
PMV BLD AUTO: 8.5 FL (ref 6–12)
POTASSIUM SERPL-SCNC: 3.9 MMOL/L (ref 3.5–5.2)
RBC # BLD AUTO: 3.29 10*6/MM3 (ref 4.14–5.8)
SODIUM SERPL-SCNC: 153 MMOL/L (ref 136–145)
URATE SERPL-MCNC: 8.5 MG/DL (ref 3.4–7)
WBC NRBC COR # BLD AUTO: 7.22 10*3/MM3 (ref 3.4–10.8)

## 2024-06-22 PROCEDURE — 0 DEXTROSE 5 % SOLUTION: Performed by: INTERNAL MEDICINE

## 2024-06-22 PROCEDURE — 80069 RENAL FUNCTION PANEL: CPT | Performed by: INTERNAL MEDICINE

## 2024-06-22 PROCEDURE — 25010000002 ERTAPENEM PER 500 MG: Performed by: STUDENT IN AN ORGANIZED HEALTH CARE EDUCATION/TRAINING PROGRAM

## 2024-06-22 PROCEDURE — 85025 COMPLETE CBC W/AUTO DIFF WBC: CPT | Performed by: INTERNAL MEDICINE

## 2024-06-22 PROCEDURE — 83735 ASSAY OF MAGNESIUM: CPT | Performed by: INTERNAL MEDICINE

## 2024-06-22 PROCEDURE — 25010000002 ENOXAPARIN PER 10 MG: Performed by: STUDENT IN AN ORGANIZED HEALTH CARE EDUCATION/TRAINING PROGRAM

## 2024-06-22 PROCEDURE — 84550 ASSAY OF BLOOD/URIC ACID: CPT | Performed by: INTERNAL MEDICINE

## 2024-06-22 PROCEDURE — 82746 ASSAY OF FOLIC ACID SERUM: CPT | Performed by: STUDENT IN AN ORGANIZED HEALTH CARE EDUCATION/TRAINING PROGRAM

## 2024-06-22 RX ADMIN — DEXTROSE MONOHYDRATE 125 ML/HR: 50 INJECTION, SOLUTION INTRAVENOUS at 11:28

## 2024-06-22 RX ADMIN — LANSOPRAZOLE 30 MG: 15 TABLET, ORALLY DISINTEGRATING ORAL at 05:43

## 2024-06-22 RX ADMIN — ERTAPENEM SODIUM 1000 MG: 1 INJECTION, POWDER, LYOPHILIZED, FOR SOLUTION INTRAMUSCULAR; INTRAVENOUS at 11:32

## 2024-06-22 RX ADMIN — ENOXAPARIN SODIUM 40 MG: 100 INJECTION SUBCUTANEOUS at 20:23

## 2024-06-22 RX ADMIN — MUPIROCIN 1 APPLICATION: 20 OINTMENT TOPICAL at 20:24

## 2024-06-22 RX ADMIN — TERAZOSIN 1 MG: 1 CAPSULE ORAL at 20:23

## 2024-06-22 NOTE — PLAN OF CARE
Goal Outcome Evaluation:           Progress: no change  Outcome Evaluation: Pt alert and oriented to self, pt able to follow commands. Dressings to sacrum and left heel changed. Mariscal cath draining yellow, cloudy urine. Medications per PEG. Poor PO intake noted.

## 2024-06-22 NOTE — PROGRESS NOTES
[facility] Progress Note      Patient Name: Anthony Gallegos  : 1945  MRN: [id number]  Primary Care Physician:  Keshav Eason MD  Date of admission: 2024    Subjective     Interval History:   Follow-up on MARTITA on CKD 2.      Patient remains obtunded  Only grimaces and moans upon light verbal and physical stimuli  Urine output yesterday 1.3L    Review of Systems:   Unable to obtain    Objective     Vitals:   Temp:  [97.3 °F (36.3 °C)-97.9 °F (36.6 °C)] 97.3 °F (36.3 °C)  Heart Rate:  [] 95  Resp:  [16-20] 18  BP: (108-124)/(57-77) 108/61    Intake/Output Summary (Last 24 hours) at 2024 1337  Last data filed at 2024 0416  Gross per 24 hour   Intake --   Output 1325 ml   Net -1325 ml       Physical Exam:    General Appearance: Cachectic. No acute distress, chronically ill, obtunded  Skin: warm and dry  HEENT: oral mucosa dry., nonicteric sclera.  Edentulous.  Neck: No JVD  Lungs: Diminished to auscultation, effort nonlabored  Heart: RRR, no rub, no murmur  Abdomen: soft, nontender, nondistended.  Normoactive bowel, feeding tube taped to left upper quadrant  : Mariscal catheter in place with sediment in bag  Extremities: 1+ edema to left lower extremity, up to thigh.  No muscle mass.      Scheduled Meds:     enoxaparin, 40 mg, Subcutaneous, Q24H  ertapenem, 1,000 mg, Intravenous, Q24H  lansoprazole, 30 mg, Per G Tube, Q AM  mupirocin, 1 Application, Topical, Q12H  potassium chloride, 40 mEq, Per G Tube, Once  sodium chloride, 10 mL, Intravenous, Q12H  terazosin, 1 mg, Per G Tube, Nightly      IV Meds:   dextrose, 125 mL/hr, Last Rate: 125 mL/hr (24 1128)          Results Reviewed:   I have personally reviewed the results from the time of this admission to 2024 13:37 EDT     Results from last 7 days   Lab Units 24  0548 24  0434 24  0601 24  0711 24  0033   SODIUM mmol/L 153* 155* 149*   < > 130*   POTASSIUM mmol/L 3.9 3.7  3.4*   < > 5.7*   CHLORIDE mmol/L 123* 122* 115*   < > 96*   CO2 mmol/L 23.0 23.7 26.0   < > 20.5*   BUN mg/dL 31* 46* 61*   < > 103*   CREATININE mg/dL 0.65* 0.82 0.96   < > 3.75*   CALCIUM mg/dL 10.1 8.6 8.3*   < > 9.1   BILIRUBIN mg/dL  --   --   --   --  0.6   ALK PHOS U/L  --   --   --   --  78   ALT (SGPT) U/L  --   --   --   --  25   AST (SGOT) U/L  --   --   --   --  34   GLUCOSE mg/dL 109* 97 75   < > 111*    < > = values in this interval not displayed.       Estimated Creatinine Clearance: 79.2 mL/min (A) (by C-G formula based on SCr of 0.65 mg/dL (L)).    Results from last 7 days   Lab Units 06/22/24  0548 06/21/24  0434 06/20/24  0601 06/19/24  0925   MAGNESIUM mg/dL 1.9  --  2.3 2.4   PHOSPHORUS mg/dL 2.3* 2.2* 2.2* 3.3       Results from last 7 days   Lab Units 06/22/24  0548 06/21/24  0434 06/20/24  0601 06/19/24  0925 06/18/24  0711   URIC ACID mg/dL 8.5* 8.7* 9.7* 9.4* 9.1*       Results from last 7 days   Lab Units 06/22/24  0548 06/20/24  0601 06/19/24  0925 06/18/24  0711 06/18/24  0033   WBC 10*3/mm3 7.22 8.36 7.93 5.17 5.95   HEMOGLOBIN g/dL 8.9* 9.5* 9.0* 10.4* 10.5*   PLATELETS 10*3/mm3 317 300 264 248 231             Assessment / Plan     ASSESSMENT:  Acute kidney injury on CKD 2.  Very poor muscle mass.  Likely due to obstruction.  Bilateral hydronephrosis and urinary retention.  Now with Mariscal catheter and on terazosin.  Renal function significant improved after Mariscal catheter placement.  Creatinine today 0.65, BUN 31, mild peripheral edema  Hypernatremia, patient is dehydrated, secondary to free water deficit, slowly improving on D5W at 125 cc/h  ESBL urinary tract infection, ESBL bacteremia. On ertapenem.  Failure to thrive.  Dysphagia on modified diet.  Has PEG tube but not currently on tube feeds.  Albumin 2. 5  Anemia, hemoglobin stable at 8.9    PLAN:  Continue D5W but increase the rate to 150 cc/h  Patient may need tube feed  Surveillance  labs    I reviewed the chart and other  providers notes, I reviewed labs.  Copied text in this note has been reviewed and is accurate as of 06/22/24.       Thank you for involving us in the care of Anthony Gallegos.  Please feel free to call with any questions.    Thompson Novoa MD  06/22/24  13:37 EDT    [facility]  [phone]    Please note that portions of this note were completed with a voice recognition program.  Copied portions of the note reviewed and accurate as of 6/20/2024.

## 2024-06-22 NOTE — PROGRESS NOTES
Name: Anthony Gallegos ADMIT: 2024   : 1945  PCP: Keshav Eason MD    MRN: [id number] LOS: 4 days   AGE/SEX: 79 y.o. male  ROOM: [facility]     Subjective   Subjective     No events overnight. Sodium is only down to 153 today. Nephrology has increased the rate. Apparently, he is normally on nocturnal tube feeds.       Objective   Objective   Vital Signs  Temp:  [97.3 °F (36.3 °C)-97.9 °F (36.6 °C)] 97.3 °F (36.3 °C)  Heart Rate:  [] 95  Resp:  [16-18] 18  BP: (104-124)/(61-77) 104/62  SpO2:  [96 %-100 %] 100 %  on   ;   Device (Oxygen Therapy): room air  Body mass index is 17.03 kg/m².  Physical Exam  Constitutional:       General: He is not in acute distress.     Appearance: He is ill-appearing. He is not toxic-appearing.   Cardiovascular:      Rate and Rhythm: Normal rate and regular rhythm.      Heart sounds: Normal heart sounds.   Pulmonary:      Effort: Pulmonary effort is normal.      Breath sounds: Normal breath sounds.   Abdominal:      General: Bowel sounds are normal.      Palpations: Abdomen is soft.      Comments: PEG   Musculoskeletal:         General: No tenderness.      Right lower leg: No edema.      Left lower leg: No edema.   Neurological:      General: No focal deficit present.      Mental Status: He is alert. He is disoriented.   Psychiatric:         Mood and Affect: Mood normal.         Behavior: Behavior normal.         Results Review     I reviewed the patient's new clinical results.  Results from last 7 days   Lab Units 24  0548 24  0601 24  0925 24  0711   WBC 10*3/mm3 7.22 8.36 7.93 5.17   HEMOGLOBIN g/dL 8.9* 9.5* 9.0* 10.4*   PLATELETS 10*3/mm3 317 300 264 248     Results from last 7 days   Lab Units 24  0548 24  0434 24  0601 24  0925   SODIUM mmol/L 153* 155* 149* 142   POTASSIUM mmol/L 3.9 3.7 3.4* 3.8   CHLORIDE mmol/L 123* 122* 115* 110*   CO2 mmol/L 23.0 23.7 26.0 20.3*   BUN mg/dL 31* 46* 61* 80*   CREATININE mg/dL  "0.65* 0.82 0.96 1.63*   GLUCOSE mg/dL 109* 97 75 76   Estimated Creatinine Clearance: 79.2 mL/min (A) (by C-G formula based on SCr of 0.65 mg/dL (L)).  Results from last 7 days   Lab Units 06/22/24  0548 06/21/24  0434 06/20/24  0601 06/19/24  0925 06/18/24  0033   ALBUMIN g/dL 2.5* 2.7* 2.6* 2.4* 2.9*   BILIRUBIN mg/dL  --   --   --   --  0.6   ALK PHOS U/L  --   --   --   --  78   AST (SGOT) U/L  --   --   --   --  34   ALT (SGPT) U/L  --   --   --   --  25     Results from last 7 days   Lab Units 06/22/24  0548 06/21/24 0434 06/20/24 0601 06/19/24 0925 06/18/24  0711 06/18/24  0033   CALCIUM mg/dL 10.1 8.6 8.3* 8.5*   < > 9.1   ALBUMIN g/dL 2.5* 2.7* 2.6* 2.4*  --  2.9*   MAGNESIUM mg/dL 1.9  --  2.3 2.4  --  2.5*   PHOSPHORUS mg/dL 2.3* 2.2* 2.2* 3.3  --   --     < > = values in this interval not displayed.     Results from last 7 days   Lab Units 06/18/24  0033   PROCALCITONIN ng/mL 21.30*   LACTATE mmol/L 2.0     COVID19   Date Value Ref Range Status   03/25/2024 Not Detected Not Detected - Ref. Range Final     No results found for: \"HGBA1C\", \"POCGLU\"    US Renal Bilateral  US RENAL BILATERAL-     Clinical: Follow-up hydronephrosis     COMPARISON CT of the abdomen and pelvis 6/18/2024     FINDINGS: The right kidney is 10.8 cm in length and the left kidney is  10.6 cm in length. No hydronephrosis on the right or left. Mariscal  catheter within the urinary bladder, cannot evaluate either ureteral  jet.     The overall renal cortical echotexture appears greater than anticipated,  suggesting medical renal disease. There is a right renal cyst measuring  5.6 cm in maximum diameter. No renal calculus identified. The remainder  is unremarkable.     This report was finalized on 6/21/2024 4:34 PM by Dr. Vamsi Pettit M.D  on Workstation: [id number]       Scheduled Medications  enoxaparin, 40 mg, Subcutaneous, Q24H  ertapenem, 1,000 mg, Intravenous, Q24H  lansoprazole, 30 mg, Per G Tube, Q AM  mupirocin, 1 Application, " Topical, Q12H  potassium chloride, 40 mEq, Per G Tube, Once  sodium chloride, 10 mL, Intravenous, Q12H  terazosin, 1 mg, Per G Tube, Nightly    Infusions  dextrose, 150 mL/hr, Last Rate: 125 mL/hr (06/22/24 1128)    Diet  Diet: Regular/House; Texture: Pureed (NDD 1); Fluid Consistency: Honey Thick       Assessment/Plan     Active Hospital Problems    Diagnosis  POA    **UTI (urinary tract infection), bacterial [N39.0, A49.9]  Yes    ARF (acute renal failure) [N17.9]  Yes    Failure to thrive in adult [R62.7]  Yes    Dysphagia [R13.10]  Yes    Generalized weakness [R53.1]  Yes    DISH (disseminated idiopathic skeletal hyperostosis) [M48.10]  Yes    Immobility [Z74.09]  Yes    Generalized pain [R52]  Yes    Hypertension [I10]  Yes      Resolved Hospital Problems   No resolved problems to display.       79 y.o. male admitted with UTI (urinary tract infection), bacterial.    ESBL E coli UTI and bacteremia-on ertapenem. Anticipate a 7-14 day course of IV therapy. Repeat blood cultures are negative at 3 days  MARTITA-likely predominantly due to obstructive uropathy. Resolved.  Urinary retention causing bilateral hydronephrosis and MARTITA-he had recently had his long term forde removed prior to discharge. This has been replaced. Urology recommends keeping it until outpatient follow up. Renal ultrasound shows resolution of hydronephrosis. Continue terazosin   Hypernatremia-on d5w per nephrology  Metabolic encephalopathy-likely related to some combination of the above  Anemia of chronic disease-hgb 8.9  Chronic dysphagia-on a modified diet. Ask nutrition to start nocturnal tube feeds  Failure to thrive/severe malnutrition-bmp 17. Prealbumin 5.1.  GERD-ppi   Ankylosing spondylitis-on methotrexate as an outpatient  Lovenox 40 mg SC daily for DVT prophylaxis.  Full code.  Discussed with patient and nursing staff.  Anticipate discharge  return to [facility]  timing yet to be determined.      Gerson Townsend MD  [facility]  06/22/24  15:40 EDT    I wore protective equipment throughout this patient encounter including a face mask, gloves and protective eyewear.  Hand hygiene was performed before donning protective equipment and after removal when leaving the room.

## 2024-06-23 LAB
ALBUMIN SERPL-MCNC: 2.4 G/DL (ref 3.5–5.2)
ANION GAP SERPL CALCULATED.3IONS-SCNC: 8.7 MMOL/L (ref 5–15)
BACTERIA ISLT: NORMAL
BACTERIA SPEC AEROBE CULT: NORMAL
BASOPHILS # BLD AUTO: 0.02 10*3/MM3 (ref 0–0.2)
BASOPHILS NFR BLD AUTO: 0.3 % (ref 0–1.5)
BUN SERPL-MCNC: 21 MG/DL (ref 8–23)
BUN/CREAT SERPL: 30 (ref 7–25)
CALCIUM SPEC-SCNC: 8.2 MG/DL (ref 8.6–10.5)
CHLORIDE SERPL-SCNC: 113 MMOL/L (ref 98–107)
CO2 SERPL-SCNC: 22.3 MMOL/L (ref 22–29)
CREAT SERPL-MCNC: 0.7 MG/DL (ref 0.76–1.27)
DEPRECATED RDW RBC AUTO: 47.5 FL (ref 37–54)
EGFRCR SERPLBLD CKD-EPI 2021: 93.1 ML/MIN/1.73
EOSINOPHIL # BLD AUTO: 0.25 10*3/MM3 (ref 0–0.4)
EOSINOPHIL NFR BLD AUTO: 3.8 % (ref 0.3–6.2)
ERYTHROCYTE [DISTWIDTH] IN BLOOD BY AUTOMATED COUNT: 14.5 % (ref 12.3–15.4)
GLUCOSE SERPL-MCNC: 81 MG/DL (ref 65–99)
HCT VFR BLD AUTO: 29.6 % (ref 37.5–51)
HGB BLD-MCNC: 9.2 G/DL (ref 13–17.7)
IMM GRANULOCYTES # BLD AUTO: 0.03 10*3/MM3 (ref 0–0.05)
IMM GRANULOCYTES NFR BLD AUTO: 0.5 % (ref 0–0.5)
LYMPHOCYTES # BLD AUTO: 1.3 10*3/MM3 (ref 0.7–3.1)
LYMPHOCYTES NFR BLD AUTO: 19.8 % (ref 19.6–45.3)
MAGNESIUM SERPL-MCNC: 1.8 MG/DL (ref 1.6–2.4)
MCH RBC QN AUTO: 27.5 PG (ref 26.6–33)
MCHC RBC AUTO-ENTMCNC: 31.1 G/DL (ref 31.5–35.7)
MCV RBC AUTO: 88.6 FL (ref 79–97)
MONOCYTES # BLD AUTO: 0.47 10*3/MM3 (ref 0.1–0.9)
MONOCYTES NFR BLD AUTO: 7.2 % (ref 5–12)
NEUTROPHILS NFR BLD AUTO: 4.5 10*3/MM3 (ref 1.7–7)
NEUTROPHILS NFR BLD AUTO: 68.4 % (ref 42.7–76)
NRBC BLD AUTO-RTO: 0 /100 WBC (ref 0–0.2)
PHOSPHATE SERPL-MCNC: 2.4 MG/DL (ref 2.5–4.5)
PLATELET # BLD AUTO: 284 10*3/MM3 (ref 140–450)
PMV BLD AUTO: 8.6 FL (ref 6–12)
POTASSIUM SERPL-SCNC: 3.7 MMOL/L (ref 3.5–5.2)
RBC # BLD AUTO: 3.34 10*6/MM3 (ref 4.14–5.8)
SODIUM SERPL-SCNC: 144 MMOL/L (ref 136–145)
URATE SERPL-MCNC: 7.3 MG/DL (ref 3.4–7)
WBC NRBC COR # BLD AUTO: 6.57 10*3/MM3 (ref 3.4–10.8)

## 2024-06-23 PROCEDURE — 84550 ASSAY OF BLOOD/URIC ACID: CPT

## 2024-06-23 PROCEDURE — 80069 RENAL FUNCTION PANEL: CPT

## 2024-06-23 PROCEDURE — 85025 COMPLETE CBC W/AUTO DIFF WBC: CPT

## 2024-06-23 PROCEDURE — 25010000002 ENOXAPARIN PER 10 MG: Performed by: STUDENT IN AN ORGANIZED HEALTH CARE EDUCATION/TRAINING PROGRAM

## 2024-06-23 PROCEDURE — 0 DEXTROSE 5 % SOLUTION: Performed by: NURSE PRACTITIONER

## 2024-06-23 PROCEDURE — 25010000002 ERTAPENEM PER 500 MG: Performed by: STUDENT IN AN ORGANIZED HEALTH CARE EDUCATION/TRAINING PROGRAM

## 2024-06-23 PROCEDURE — 83735 ASSAY OF MAGNESIUM: CPT

## 2024-06-23 PROCEDURE — 0 DEXTROSE 5 % SOLUTION: Performed by: INTERNAL MEDICINE

## 2024-06-23 RX ADMIN — DEXTROSE MONOHYDRATE 75 ML/HR: 50 INJECTION, SOLUTION INTRAVENOUS at 14:18

## 2024-06-23 RX ADMIN — DEXTROSE MONOHYDRATE 150 ML/HR: 50 INJECTION, SOLUTION INTRAVENOUS at 05:19

## 2024-06-23 RX ADMIN — LANSOPRAZOLE 30 MG: 15 TABLET, ORALLY DISINTEGRATING ORAL at 05:27

## 2024-06-23 RX ADMIN — Medication 10 ML: at 20:42

## 2024-06-23 RX ADMIN — TERAZOSIN 1 MG: 1 CAPSULE ORAL at 21:24

## 2024-06-23 RX ADMIN — ENOXAPARIN SODIUM 40 MG: 100 INJECTION SUBCUTANEOUS at 20:42

## 2024-06-23 RX ADMIN — MUPIROCIN 1 APPLICATION: 20 OINTMENT TOPICAL at 20:43

## 2024-06-23 RX ADMIN — ERTAPENEM SODIUM 1000 MG: 1 INJECTION, POWDER, LYOPHILIZED, FOR SOLUTION INTRAMUSCULAR; INTRAVENOUS at 12:05

## 2024-06-23 NOTE — PLAN OF CARE
Problem: Aspiration (Enteral Nutrition)  Goal: Absence of Aspiration Signs and Symptoms  Outcome: Ongoing, Progressing     Problem: Device-Related Complication Risk (Enteral Nutrition)  Goal: Safe, Effective Therapy Delivery  Outcome: Ongoing, Progressing     Problem: Feeding Intolerance (Enteral Nutrition)  Goal: Feeding Tolerance  Outcome: Ongoing, Progressing   Goal Outcome Evaluation:         RD to follow

## 2024-06-23 NOTE — PROGRESS NOTES
[facility] Progress Note      Patient Name: Anthony Gallegos  : 1945  MRN: [id number]  Primary Care Physician:  Keshav Eason MD  Date of admission: 2024    Subjective     Interval History:   Follow-up on MARTITA on CKD II.      Patient a little more alert today but still confused.   Patient denies any shortness of breath or chest pain.   Breakfast tray at bedside with very little eaten this morning.  Urine output yesterday 1675 mL    Review of Systems:   Difficult to obtain.    Objective     Vitals:   Temp:  [97.3 °F (36.3 °C)-98.2 °F (36.8 °C)] 97.3 °F (36.3 °C)  Heart Rate:  [93-96] 93  Resp:  [18] 18  BP: (104-118)/(49-62) 104/49    Intake/Output Summary (Last 24 hours) at 2024 1020  Last data filed at 2024 1000  Gross per 24 hour   Intake 240 ml   Output 1875 ml   Net -1635 ml       Physical Exam:    General Appearance: Cachectic. No acute distress, chronically ill, confused  Skin: warm and dry  HEENT: oral mucosa dry., nonicteric sclera.  Edentulous.  Neck: No JVD  Lungs: Diminished to auscultation, effort nonlabored  Heart: RRR, no rub, no murmur  Abdomen: soft, nontender, nondistended.  Normoactive bowel, feeding tube taped to left upper quadrant  : Mariscal catheter in place with sediment in bag  Extremities: 1+ edema to left lower extremity, up to thigh.  No muscle mass.      Scheduled Meds:     enoxaparin, 40 mg, Subcutaneous, Q24H  ertapenem, 1,000 mg, Intravenous, Q24H  lansoprazole, 30 mg, Per G Tube, Q AM  mupirocin, 1 Application, Topical, Q12H  potassium chloride, 40 mEq, Per G Tube, Once  sodium chloride, 10 mL, Intravenous, Q12H  terazosin, 1 mg, Per G Tube, Nightly      IV Meds:   dextrose, 150 mL/hr, Last Rate: 150 mL/hr (24 0519)          Results Reviewed:   I have personally reviewed the results from the time of this admission to 2024 10:20 EDT     Results from last 7 days   Lab Units 24  0922 24  0548 24  0434  06/18/24  0711 06/18/24  0033   SODIUM mmol/L 144 153* 155*   < > 130*   POTASSIUM mmol/L 3.7 3.9 3.7   < > 5.7*   CHLORIDE mmol/L 113* 123* 122*   < > 96*   CO2 mmol/L 22.3 23.0 23.7   < > 20.5*   BUN mg/dL 21 31* 46*   < > 103*   CREATININE mg/dL 0.70* 0.65* 0.82   < > 3.75*   CALCIUM mg/dL 8.2* 10.1 8.6   < > 9.1   BILIRUBIN mg/dL  --   --   --   --  0.6   ALK PHOS U/L  --   --   --   --  78   ALT (SGPT) U/L  --   --   --   --  25   AST (SGOT) U/L  --   --   --   --  34   GLUCOSE mg/dL 81 109* 97   < > 111*    < > = values in this interval not displayed.       Estimated Creatinine Clearance: 71.9 mL/min (A) (by C-G formula based on SCr of 0.7 mg/dL (L)).    Results from last 7 days   Lab Units 06/23/24  0922 06/22/24  0548 06/21/24  0434 06/20/24  0601   MAGNESIUM mg/dL 1.8 1.9  --  2.3   PHOSPHORUS mg/dL 2.4* 2.3* 2.2* 2.2*       Results from last 7 days   Lab Units 06/23/24  0922 06/22/24  0548 06/21/24  0434 06/20/24  0601 06/19/24  0925 06/18/24  0711   URIC ACID mg/dL 7.3* 8.5* 8.7* 9.7* 9.4* 9.1*       Results from last 7 days   Lab Units 06/23/24  0922 06/22/24  0548 06/20/24  0601 06/19/24  0925 06/18/24  0711   WBC 10*3/mm3 6.57 7.22 8.36 7.93 5.17   HEMOGLOBIN g/dL 9.2* 8.9* 9.5* 9.0* 10.4*   PLATELETS 10*3/mm3 284 317 300 264 248             Assessment / Plan     ASSESSMENT:  Acute kidney injury, resolved.  Very poor muscle mass.  Likely due to obstruction.  Bilateral hydronephrosis and urinary retention.  Now with Mariscal catheter and on terazosin.  Renal function significant improved after Mariscal catheter placement.  Creatinine today 0.7, BUN 21, mild peripheral edema  Hypernatremia, patient is dehydrated, secondary to free water deficit, sodium down to 144, getting D5W  ESBL urinary tract infection, ESBL bacteremia. On ertapenem.  Failure to thrive.  Dysphagia on modified diet. Has PEG tube but not currently on tube feeds.  Albumin 2.4  Anemia, hemoglobin yesterday was 9.2 g/dl      PLAN:  Continue D5W  but will decrease the rate down to 75cc/h  Patient may need tube feed  Surveillance  labs    I reviewed the chart and other providers notes, I reviewed labs.  Copied text in this note has been reviewed and is accurate as of 06/23/24.       Thank you for involving us in the care of Anthony Gallegos.  Please feel free to call with any questions.    Thompson Novoa MD  06/23/24  10:20 EDT    [facility]  [phone]    Please note that portions of this note were completed with a voice recognition program.  Copied portions of the note reviewed and accurate as of 6/20/2024.

## 2024-06-23 NOTE — PROGRESS NOTES
Name: Anthony Gallegos ADMIT: 2024   : 1945  PCP: Keshav Eason MD    MRN: [id number] LOS: 5 days   AGE/SEX: 79 y.o. male  ROOM: [facility]     Subjective   Subjective     No events overnight. Sodium is better today at 144. No complaints.       Objective   Objective   Vital Signs  Temp:  [97.3 °F (36.3 °C)-98.2 °F (36.8 °C)] 97.5 °F (36.4 °C)  Heart Rate:  [93-96] 93  Resp:  [18] 18  BP: ()/(49-62) 95/54  SpO2:  [95 %-100 %] 95 %  on   ;   Device (Oxygen Therapy): (P) room air  Body mass index is 16.64 kg/m².  Physical Exam  Constitutional:       General: He is not in acute distress.     Appearance: He is ill-appearing. He is not toxic-appearing.   Cardiovascular:      Rate and Rhythm: Normal rate and regular rhythm.      Heart sounds: Normal heart sounds.   Pulmonary:      Effort: Pulmonary effort is normal.      Breath sounds: Normal breath sounds.   Abdominal:      General: Bowel sounds are normal.      Palpations: Abdomen is soft.      Comments: PEG   Musculoskeletal:         General: No tenderness.      Right lower leg: No edema.      Left lower leg: No edema.   Neurological:      Mental Status: He is alert.   Psychiatric:         Mood and Affect: Mood normal.         Behavior: Behavior normal.         Results Review     I reviewed the patient's new clinical results.  Results from last 7 days   Lab Units 24  0922 24  0548 24  0601 24  0925   WBC 10*3/mm3 6.57 7.22 8.36 7.93   HEMOGLOBIN g/dL 9.2* 8.9* 9.5* 9.0*   PLATELETS 10*3/mm3 284 317 300 264     Results from last 7 days   Lab Units 24  0922 24  0548 24  0434 24  0601   SODIUM mmol/L 144 153* 155* 149*   POTASSIUM mmol/L 3.7 3.9 3.7 3.4*   CHLORIDE mmol/L 113* 123* 122* 115*   CO2 mmol/L 22.3 23.0 23.7 26.0   BUN mg/dL 21 31* 46* 61*   CREATININE mg/dL 0.70* 0.65* 0.82 0.96   GLUCOSE mg/dL 81 109* 97 75   Estimated Creatinine Clearance: 71.9 mL/min (A) (by C-G formula based on SCr of 0.7  "mg/dL (L)).  Results from last 7 days   Lab Units 06/23/24  0922 06/22/24  0548 06/21/24  0434 06/20/24  0601 06/19/24  0925 06/18/24  0033   ALBUMIN g/dL 2.4* 2.5* 2.7* 2.6*   < > 2.9*   BILIRUBIN mg/dL  --   --   --   --   --  0.6   ALK PHOS U/L  --   --   --   --   --  78   AST (SGOT) U/L  --   --   --   --   --  34   ALT (SGPT) U/L  --   --   --   --   --  25    < > = values in this interval not displayed.     Results from last 7 days   Lab Units 06/23/24  0922 06/22/24  0548 06/21/24 0434 06/20/24  0601 06/19/24  0925   CALCIUM mg/dL 8.2* 10.1 8.6 8.3* 8.5*   ALBUMIN g/dL 2.4* 2.5* 2.7* 2.6* 2.4*   MAGNESIUM mg/dL 1.8 1.9  --  2.3 2.4   PHOSPHORUS mg/dL 2.4* 2.3* 2.2* 2.2* 3.3     Results from last 7 days   Lab Units 06/18/24  0033   PROCALCITONIN ng/mL 21.30*   LACTATE mmol/L 2.0     COVID19   Date Value Ref Range Status   03/25/2024 Not Detected Not Detected - Ref. Range Final     No results found for: \"HGBA1C\", \"POCGLU\"    US Renal Bilateral  US RENAL BILATERAL-     Clinical: Follow-up hydronephrosis     COMPARISON CT of the abdomen and pelvis 6/18/2024     FINDINGS: The right kidney is 10.8 cm in length and the left kidney is  10.6 cm in length. No hydronephrosis on the right or left. Mariscal  catheter within the urinary bladder, cannot evaluate either ureteral  jet.     The overall renal cortical echotexture appears greater than anticipated,  suggesting medical renal disease. There is a right renal cyst measuring  5.6 cm in maximum diameter. No renal calculus identified. The remainder  is unremarkable.     This report was finalized on 6/21/2024 4:34 PM by Dr. Vamsi Pettit M.D  on Workstation: [id number]       Scheduled Medications  enoxaparin, 40 mg, Subcutaneous, Q24H  ertapenem, 1,000 mg, Intravenous, Q24H  lansoprazole, 30 mg, Per G Tube, Q AM  mupirocin, 1 Application, Topical, Q12H  potassium chloride, 40 mEq, Per G Tube, Once  sodium chloride, 10 mL, Intravenous, Q12H  terazosin, 1 mg, Per G Tube, " Nightly    Infusions  dextrose, 75 mL/hr, Last Rate: 75 mL/hr (06/23/24 1418)    Diet  Diet: Regular/House; Texture: Pureed (NDD 1); Fluid Consistency: Honey Thick       Assessment/Plan     Active Hospital Problems    Diagnosis  POA    **UTI (urinary tract infection), bacterial [N39.0, A49.9]  Yes    ARF (acute renal failure) [N17.9]  Yes    Failure to thrive in adult [R62.7]  Yes    Dysphagia [R13.10]  Yes    Generalized weakness [R53.1]  Yes    DISH (disseminated idiopathic skeletal hyperostosis) [M48.10]  Yes    Immobility [Z74.09]  Yes    Generalized pain [R52]  Yes    Hypertension [I10]  Yes      Resolved Hospital Problems   No resolved problems to display.       79 y.o. male admitted with UTI (urinary tract infection), bacterial.    ESBL E coli UTI and bacteremia-on ertapenem. Anticipate a 7-14 day (day 6 today) course of IV therapy. Repeat blood cultures are negative at 4 days  MARTITA-likely predominantly due to obstructive uropathy. Resolved.  Urinary retention causing bilateral hydronephrosis and MARTITA-he had recently had his long term forde removed prior to discharge. This has been replaced. Urology recommends keeping it until outpatient follow up. Renal ultrasound shows resolution of hydronephrosis. Continue terazosin   Hypernatremia-on d5w per nephrology. Improved   Metabolic encephalopathy-likely related to some combination of the above, improving  Anemia of chronic disease-hgb 9.2  Chronic dysphagia-on a modified diet and nocturnal tube feeds  Failure to thrive/severe malnutrition-bmp 16.64. Prealbumin 5.1.  GERD-ppi   Ankylosing spondylitis-on methotrexate as an outpatient  Lovenox 40 mg SC daily for DVT prophylaxis.  Full code.  Discussed with patient and nursing staff.  Anticipate discharge  return to [facility]  in 2-3 days.      Gerson Townsend MD  [facility]  06/23/24  15:00 EDT    I wore protective equipment throughout this patient encounter including a face mask,  gloves and protective eyewear.  Hand hygiene was performed before donning protective equipment and after removal when leaving the room.

## 2024-06-23 NOTE — PLAN OF CARE
Goal Outcome Evaluation:  Plan of Care Reviewed With: (P) patient        Progress: (P) improving          Pt A&O x 2-3, RA. Tube feeds to resume over night. Heel dressings changed during the shift. No other complaints noted.

## 2024-06-23 NOTE — CONSULTS
"Nutrition Services    Patient Name:  Anthony Gallegos  YOB: 1945  MRN: [id number]  Admit Date:  6/17/2024    Assessment Date:  06/23/24    Summary: TF assessment.     Per MD note pt is usually on nocturnal feeds at home. Pt evaluated by SLP on 6/19 who recs puree solids and HTL liquids. Per EMR, pt ate 25-75% of 3 recorded meals on 6/20. Pt seen by associated RD on 6/19 who put TF recs in their note but pt has not been started on Tf's. Na 144 today. Pt currently on 75 ml/hr D5W which is providing 504 kcals.     RECS:   Initiate nocturnal feeds from 8p to 8a of Novasource Renal at 20 ml hr and advance 10 ml q 4 hrs to goal of 45 ml/hr to provide 1080 kcals, 49 g/protein, and 387 ml free water. Nephrology to manage free water flushes.      RD to follow tolerance, labs and clinical course.    CLINICAL NUTRITION ASSESSMENT      Reason for Assessment Physician Consult, TF Assessment     Diagnosis/Problem   UTI  ARF  Failure to thrive  Dysphagia with feeding tube (though apparently eats modified diet)  Generalized weakness   Medical/Surgical History Past Medical History:   Diagnosis Date    Abscess of scrotum     MARTITA (acute kidney injury)     Altered mental state     Arthritis     AS (ankylosing spondylitis)     History of MRSA infection     Hypertension     Leukocytosis     Tetrahydrocannabinol (THC) use disorder, mild, abuse 12/20/2023    Uveitis        Past Surgical History:   Procedure Laterality Date    COLONOSCOPY      ENDOSCOPY W/ PEG TUBE PLACEMENT N/A 3/29/2024    Procedure: ESOPHAGOGASTRODUODENOSCOPY WITH PERCUTANEOUS ENDOSCOPIC GASTROSTOMY TUBE INSERTION;  Surgeon: Khadar Broderick MD;  Location: [facility] ENDOSCOPY;  Service: General;  Laterality: N/A;  PRE/POST - DYSPHAGIA    ROTATOR CUFF REPAIR Right     TOTAL HIP ARTHROPLASTY Left 2014        Anthropometrics        Current Height  Current Weight  BMI kg/m2 Height: 190.5 cm (75\")  Weight: 60.4 kg (133 lb 2.5 oz) (06/23/24 0600)  Body mass " index is 16.64 kg/m².   Adjusted BMI (if applicable)    BMI Category Underweight (18.4 or below)   Ideal Body Weight (IBW) 186 lbs   Usual Body Weight (UBW) unknown   Weight Trend Loss, Amount/Timeframe: 23.1% wt loss x 3 months   Weight History Wt Readings from Last 30 Encounters:   06/23/24 0600 60.4 kg (133 lb 2.5 oz)   06/22/24 0556 61.8 kg (136 lb 3.9 oz)   06/21/24 0500 62 kg (136 lb 11 oz)   06/20/24 0419 65.4 kg (144 lb 2.9 oz)   06/17/24 2351 63.1 kg (139 lb 1.6 oz)   04/09/24 0516 90.4 kg (199 lb 4.7 oz)   04/08/24 0420 90.5 kg (199 lb 8.3 oz)   04/07/24 0600 81 kg (178 lb 9.2 oz)   04/06/24 0500 80 kg (176 lb 5.9 oz)   04/03/24 0521 81.4 kg (179 lb 7.3 oz)   04/02/24 0435 80.4 kg (177 lb 4 oz)   04/01/24 0420 80.9 kg (178 lb 5.6 oz)   03/31/24 0530 82.7 kg (182 lb 6.4 oz)   03/30/24 0557 82.2 kg (181 lb 3.5 oz)   03/29/24 0548 82.6 kg (182 lb 1.6 oz)   03/28/24 0500 76.9 kg (169 lb 8 oz)   03/27/24 0617 76.4 kg (168 lb 6.4 oz)   03/25/24 2123 69.1 kg (152 lb 6.4 oz)   03/25/24 1516 79 kg (174 lb 2.6 oz)   01/31/24 0639 79 kg (174 lb 3.2 oz)   01/29/24 0509 78.8 kg (173 lb 11.6 oz)   01/28/24 0530 77.7 kg (171 lb 4.8 oz)   01/25/24 0537 76.4 kg (168 lb 6.9 oz)   01/23/24 0542 73.2 kg (161 lb 6 oz)   01/22/24 0502 75.1 kg (165 lb 9.1 oz)   01/21/24 2304 75.5 kg (166 lb 7.2 oz)   01/21/24 1744 81.6 kg (180 lb)   01/08/24 0253 81.6 kg (179 lb 14.3 oz)   01/05/24 0440 87.9 kg (193 lb 12.6 oz)   01/04/24 0439 85.6 kg (188 lb 11.4 oz)   01/03/24 0513 82.5 kg (181 lb 14.1 oz)   12/31/23 1300 80.5 kg (177 lb 7.5 oz)   12/31/23 0350 83.8 kg (184 lb 11.9 oz)   12/30/23 0511 85.3 kg (188 lb 0.8 oz)   12/29/23 0341 85.7 kg (188 lb 15 oz)   12/28/23 0435 85.4 kg (188 lb 4.4 oz)   12/27/23 0755 81 kg (178 lb 9.2 oz)   12/27/23 0423 85.4 kg (188 lb 4.4 oz)   12/25/23 0707 85.2 kg (187 lb 13.3 oz)   12/23/23 0608 81.3 kg (179 lb 3.7 oz)   12/22/23 0430 81.8 kg (180 lb 5.4 oz)   12/20/23 1005 81.9 kg (180 lb 8 oz)    03/27/18 1439 100 kg (221 lb)   03/22/18 1613 101 kg (222 lb 12.8 oz)   01/23/18 1414 101 kg (223 lb)   12/26/17 1111 99.8 kg (220 lb)   10/19/17 1239 95.3 kg (210 lb)   08/30/17 1459 91.3 kg (201 lb 3.2 oz)   08/29/17 1221 93 kg (205 lb)   08/10/17 1517 88 kg (194 lb)   07/14/17 1041 85.9 kg (189 lb 6.4 oz)   06/29/17 1318 84.5 kg (186 lb 3.2 oz)   06/21/17 1922 90.7 kg (200 lb)      --  Labs       Pertinent Labs    Results from last 7 days   Lab Units 06/23/24 0922 06/22/24  0548 06/21/24  0434 06/18/24  0711 06/18/24  0033   SODIUM mmol/L 144 153* 155*   < > 130*   POTASSIUM mmol/L 3.7 3.9 3.7   < > 5.7*   CHLORIDE mmol/L 113* 123* 122*   < > 96*   CO2 mmol/L 22.3 23.0 23.7   < > 20.5*   BUN mg/dL 21 31* 46*   < > 103*   CREATININE mg/dL 0.70* 0.65* 0.82   < > 3.75*   CALCIUM mg/dL 8.2* 10.1 8.6   < > 9.1   BILIRUBIN mg/dL  --   --   --   --  0.6   ALK PHOS U/L  --   --   --   --  78   ALT (SGPT) U/L  --   --   --   --  25   AST (SGOT) U/L  --   --   --   --  34   GLUCOSE mg/dL 81 109* 97   < > 111*    < > = values in this interval not displayed.     Results from last 7 days   Lab Units 06/23/24 0922 06/22/24  0548 06/21/24  0434 06/20/24  0601   MAGNESIUM mg/dL 1.8 1.9  --  2.3   PHOSPHORUS mg/dL 2.4* 2.3*   < > 2.2*   HEMOGLOBIN g/dL 9.2* 8.9*  --  9.5*   HEMATOCRIT % 29.6* 27.9*  --  28.0*   WBC 10*3/mm3 6.57 7.22  --  8.36   ALBUMIN g/dL 2.4* 2.5*   < > 2.6*   PREALBUMIN mg/dL  --   --   --  5.1*    < > = values in this interval not displayed.     Results from last 7 days   Lab Units 06/23/24  0922 06/22/24  0548 06/20/24  0601 06/19/24  0925 06/18/24  0711   PLATELETS 10*3/mm3 284 317 300 264 248     COVID19   Date Value Ref Range Status   03/25/2024 Not Detected Not Detected - Ref. Range Final     Lab Results   Component Value Date    HGBA1C 5.80 (H) 01/23/2024          Medications           Scheduled Medications enoxaparin, 40 mg, Subcutaneous, Q24H  ertapenem, 1,000 mg, Intravenous,  Q24H  lansoprazole, 30 mg, Per G Tube, Q AM  mupirocin, 1 Application, Topical, Q12H  potassium chloride, 40 mEq, Per G Tube, Once  sodium chloride, 10 mL, Intravenous, Q12H  terazosin, 1 mg, Per G Tube, Nightly       Infusions dextrose, 75 mL/hr, Last Rate: 75 mL/hr (06/23/24 1027)       PRN Medications   acetaminophen **OR** acetaminophen **OR** acetaminophen    senna-docusate sodium **AND** polyethylene glycol **AND** bisacodyl **AND** bisacodyl    calcium carbonate    HYDROcodone-acetaminophen    ondansetron ODT **OR** ondansetron    sodium chloride    sodium chloride     Physical Findings          General Findings disoriented, hard of hearing, impaired speech   Oral/Mouth Cavity tooth or teeth missing   Edema  lower extremity , 1+ (trace), 2+ (mild)   Gastrointestinal fecal incontinence   Skin  pressure injury: lumbar spine - stage 1, R scapula - stage 1, R lateral foot - deep tissue, R ankle - deep tissue, R heel - deep tissue, Bilateral coccyx - stage 4, location of wound: L posterior heel   Tubes/Drains/Lines PEG   NFPE See Malnutrition Severity Assessment     Malnutrition Severity Assessment      Patient meets criteria for : Severe Malnutrition         Current Nutrition Orders & Evaluation of Intake       Oral Nutrition     Food Allergies NKFA   Current PO Diet Diet: Regular/House; Texture: Pureed (NDD 1); Fluid Consistency: Honey Thick   Supplement n/a   PO Evaluation     % PO Intake 25-75% of 3 meals on 6/20    Factors Affecting Intake: swallow impairment     Estimated Requirements         Weight used  63.1 kg    Calories  2209 kcals (35 kcal/kg)    Protein  95 - 126 g (1.5 - 2.0 gm/kg)    Fluid   (Defer to physician)     PES STATEMENT / NUTRITION DIAGNOSIS      Nutrition Dx Problem  Problem: Malnutrition (severe)  Etiology: Medical Diagnosis - History of ankylosing spondylitis, MARTITA, HTN, chronic pain, silent aspiration and Factors Affecting Nutrition - swallow impairment, AMS    Signs/Symptoms: NFPE  Results, BMI, Unintended Weight Change, and Report/Observation     NUTRITION INTERVENTION / PLAN OF CARE      Intervention Goal(s) Establish goals of care, Meet estimated needs, Initiate feeding/diet, Initiate TF/PN, No significant weight loss, and Appropriate weight gain         RD Intervention/Action Await initiation/advancement of PO diet, Await initiation of EN/PN, and Continue to monitor   --      Prescription/Orders:       PO Diet       Supplements       Enteral Nutrition Enteral Prescription:      Enteral Route PEG    TF Delivery Method Cyclic 8p-8a    Enteral Product Novasource Renal    Modular None    Propofol Rate/Kcal      TF Start Rate/Volume  20    TF Goal Rate/Volume 45 mL     Free Water Flush Per nephrology    TF Provision at Goal:           Calories 1080 kcal + 504 kcals from D5W = 1584 which is 71% of needs         Protein  49 gm protein, meets 51 % needs         Fluid (mL) 387 ml free water     Prescription Ordered No, recommended         Parenteral Nutrition    New Prescription Ordered? Yes   --      Monitor/Evaluation Per protocol   Discharge Plan/Needs Pending clinical course   --    RD to follow per protocol.      Electronically signed by:  Leobardo Duron  06/23/24 10:50 EDT

## 2024-06-23 NOTE — PLAN OF CARE
Goal Outcome Evaluation:           Progress: no change  Outcome Evaluation: Pt alert and oriented to self. Wound care done to right heel and sacral area- both wounds have blackened wound beds. Mariscal cath draining yellow urine with sediment. Medications per PEG.

## 2024-06-24 LAB
ALBUMIN SERPL-MCNC: 2.2 G/DL (ref 3.5–5.2)
ANION GAP SERPL CALCULATED.3IONS-SCNC: 6.7 MMOL/L (ref 5–15)
BACTERIA SPEC AEROBE CULT: NORMAL
BACTERIA SPEC AEROBE CULT: NORMAL
BASOPHILS # BLD AUTO: 0.03 10*3/MM3 (ref 0–0.2)
BASOPHILS NFR BLD AUTO: 0.4 % (ref 0–1.5)
BUN SERPL-MCNC: 18 MG/DL (ref 8–23)
BUN/CREAT SERPL: 27.3 (ref 7–25)
CALCIUM SPEC-SCNC: 7.9 MG/DL (ref 8.6–10.5)
CHLORIDE SERPL-SCNC: 110 MMOL/L (ref 98–107)
CO2 SERPL-SCNC: 20.3 MMOL/L (ref 22–29)
CREAT SERPL-MCNC: 0.66 MG/DL (ref 0.76–1.27)
DEPRECATED RDW RBC AUTO: 47.1 FL (ref 37–54)
EGFRCR SERPLBLD CKD-EPI 2021: 94.8 ML/MIN/1.73
EOSINOPHIL # BLD AUTO: 0.26 10*3/MM3 (ref 0–0.4)
EOSINOPHIL NFR BLD AUTO: 3.3 % (ref 0.3–6.2)
ERYTHROCYTE [DISTWIDTH] IN BLOOD BY AUTOMATED COUNT: 14.8 % (ref 12.3–15.4)
GLUCOSE SERPL-MCNC: 105 MG/DL (ref 65–99)
HCT VFR BLD AUTO: 27.3 % (ref 37.5–51)
HGB BLD-MCNC: 8.4 G/DL (ref 13–17.7)
IMM GRANULOCYTES # BLD AUTO: 0.06 10*3/MM3 (ref 0–0.05)
IMM GRANULOCYTES NFR BLD AUTO: 0.8 % (ref 0–0.5)
LYMPHOCYTES # BLD AUTO: 1.15 10*3/MM3 (ref 0.7–3.1)
LYMPHOCYTES NFR BLD AUTO: 14.6 % (ref 19.6–45.3)
MAGNESIUM SERPL-MCNC: 1.7 MG/DL (ref 1.6–2.4)
MCH RBC QN AUTO: 26.8 PG (ref 26.6–33)
MCHC RBC AUTO-ENTMCNC: 30.8 G/DL (ref 31.5–35.7)
MCV RBC AUTO: 87.2 FL (ref 79–97)
MONOCYTES # BLD AUTO: 0.63 10*3/MM3 (ref 0.1–0.9)
MONOCYTES NFR BLD AUTO: 8 % (ref 5–12)
NEUTROPHILS NFR BLD AUTO: 5.72 10*3/MM3 (ref 1.7–7)
NEUTROPHILS NFR BLD AUTO: 72.9 % (ref 42.7–76)
NRBC BLD AUTO-RTO: 0 /100 WBC (ref 0–0.2)
PHOSPHATE SERPL-MCNC: 2.5 MG/DL (ref 2.5–4.5)
PLATELET # BLD AUTO: 314 10*3/MM3 (ref 140–450)
PMV BLD AUTO: 8.9 FL (ref 6–12)
POTASSIUM SERPL-SCNC: 3.4 MMOL/L (ref 3.5–5.2)
RBC # BLD AUTO: 3.13 10*6/MM3 (ref 4.14–5.8)
SODIUM SERPL-SCNC: 137 MMOL/L (ref 136–145)
URATE SERPL-MCNC: 6.5 MG/DL (ref 3.4–7)
WBC NRBC COR # BLD AUTO: 7.85 10*3/MM3 (ref 3.4–10.8)

## 2024-06-24 PROCEDURE — 0 DEXTROSE 5 % SOLUTION: Performed by: NURSE PRACTITIONER

## 2024-06-24 PROCEDURE — 80069 RENAL FUNCTION PANEL: CPT | Performed by: INTERNAL MEDICINE

## 2024-06-24 PROCEDURE — 25810000003 SODIUM CHLORIDE 0.9 % SOLUTION

## 2024-06-24 PROCEDURE — 84550 ASSAY OF BLOOD/URIC ACID: CPT | Performed by: INTERNAL MEDICINE

## 2024-06-24 PROCEDURE — 25010000002 ERTAPENEM PER 500 MG: Performed by: STUDENT IN AN ORGANIZED HEALTH CARE EDUCATION/TRAINING PROGRAM

## 2024-06-24 PROCEDURE — 85025 COMPLETE CBC W/AUTO DIFF WBC: CPT | Performed by: INTERNAL MEDICINE

## 2024-06-24 PROCEDURE — 83735 ASSAY OF MAGNESIUM: CPT | Performed by: INTERNAL MEDICINE

## 2024-06-24 PROCEDURE — 25010000002 ENOXAPARIN PER 10 MG: Performed by: STUDENT IN AN ORGANIZED HEALTH CARE EDUCATION/TRAINING PROGRAM

## 2024-06-24 RX ORDER — SODIUM CHLORIDE 9 MG/ML
50 INJECTION, SOLUTION INTRAVENOUS CONTINUOUS
Status: DISCONTINUED | OUTPATIENT
Start: 2024-06-24 | End: 2024-06-25

## 2024-06-24 RX ADMIN — Medication 10 ML: at 09:05

## 2024-06-24 RX ADMIN — LANSOPRAZOLE 30 MG: 15 TABLET, ORALLY DISINTEGRATING ORAL at 05:46

## 2024-06-24 RX ADMIN — ERTAPENEM SODIUM 1000 MG: 1 INJECTION, POWDER, LYOPHILIZED, FOR SOLUTION INTRAMUSCULAR; INTRAVENOUS at 11:38

## 2024-06-24 RX ADMIN — MUPIROCIN 1 APPLICATION: 20 OINTMENT TOPICAL at 20:08

## 2024-06-24 RX ADMIN — Medication 10 ML: at 20:07

## 2024-06-24 RX ADMIN — MUPIROCIN 1 APPLICATION: 20 OINTMENT TOPICAL at 09:04

## 2024-06-24 RX ADMIN — DEXTROSE MONOHYDRATE 75 ML/HR: 50 INJECTION, SOLUTION INTRAVENOUS at 03:10

## 2024-06-24 RX ADMIN — ENOXAPARIN SODIUM 40 MG: 100 INJECTION SUBCUTANEOUS at 20:07

## 2024-06-24 RX ADMIN — SODIUM CHLORIDE 50 ML/HR: 9 INJECTION, SOLUTION INTRAVENOUS at 14:43

## 2024-06-24 NOTE — PLAN OF CARE
Problem: Adult Inpatient Plan of Care  Goal: Plan of Care Review  Outcome: Ongoing, Progressing  Flowsheets (Taken 6/24/2024 0340)  Progress: improving  Plan of Care Reviewed With: patient   Goal Outcome Evaluation:  Patient had no complaints overnight. Alert to self. Remains on RA. VSS. Nocturnal tube feeds started. IVFs continue. Dressings changed. All needs met.

## 2024-06-24 NOTE — PLAN OF CARE
Goal Outcome Evaluation:     Patient alert to self. Garbled speech. Wound dressings changed. Occasionally refusing turns. Medicated per orders. Last dose of ertapenem on 7-1-24. Plan of care ongoing.

## 2024-06-24 NOTE — PROGRESS NOTES
[facility] Medicine Services  PROGRESS NOTE    Patient Name: Anthony Gallegos  : 1945  MRN: [id number]    Date of Admission: 2024  Primary Care Physician: Keshav Eason MD    Subjective   Subjective     CC:  Follow-up multiple issues and ESBL infection    Subjective: No new events reported.  Patient is not limited to converse much.  No new complaints or events.       Objective   Objective     Vital Signs:   Temp:  [97.5 °F (36.4 °C)-97.7 °F (36.5 °C)] 97.7 °F (36.5 °C)  Heart Rate:  [83-94] 83  Resp:  [18-20] 18  BP: (105-147)/(55-76) 105/59        Physical Exam:  Constitutional:Awake, alert, chronically ill-appearing but nontoxic  HENT: NCAT, mucous membranes moist, neck supple  Respiratory: No cough or wheezes, normal respirations, nonlabored breathing   Cardiovascular: Pulse rate is normal, palpable radial pulses  Gastrointestinal: PEG tube, soft, nontender, nondistended  Musculoskeletal: Thin frail with muscle wasting noted, no lower extremity edema, BMI 16  Psychiatric: Calm affect, reasonably cooperative  Neurologic:  follows commands  Skin: No rashes or jaundice, warm      Results Reviewed:  Results from last 7 days   Lab Units 24  0936 24  0922 24  0548 24  0711 24  0033   WBC 10*3/mm3 7.85 6.57 7.22   < > 5.95   HEMOGLOBIN g/dL 8.4* 9.2* 8.9*   < > 10.5*   HEMATOCRIT % 27.3* 29.6* 27.9*   < > 31.7*   PLATELETS 10*3/mm3 314 284 317   < > 281   PROCALCITONIN ng/mL  --   --   --   --  21.30*    < > = values in this interval not displayed.     Results from last 7 days   Lab Units 24  0936 24  0922 24  0548 24  0711 24  0033   SODIUM mmol/L 137 144 153*   < > 130*   POTASSIUM mmol/L 3.4* 3.7 3.9   < > 5.7*   CHLORIDE mmol/L 110* 113* 123*   < > 96*   CO2 mmol/L 20.3* 22.3 23.0   < > 20.5*   BUN mg/dL 18 21 31*   < > 103*   CREATININE mg/dL 0.66* 0.70* 0.65*   < > 3.75*   GLUCOSE mg/dL 105* 81 109*   < > 111*   CALCIUM mg/dL 7.9* 8.2*  10.1   < > 9.1   ALK PHOS U/L  --   --   --   --  78   ALT (SGPT) U/L  --   --   --   --  25   AST (SGOT) U/L  --   --   --   --  34    < > = values in this interval not displayed.     Estimated Creatinine Clearance: 76.3 mL/min (A) (by C-G formula based on SCr of 0.66 mg/dL (L)).    Microbiology Results Abnormal       Procedure Component Value - Date/Time    Blood Culture - Blood, Arm, Right [[id number]]  (Normal) Collected: 06/19/24 1455    Lab Status: Preliminary result Specimen: Blood from Arm, Right Updated: 06/23/24 1516     Blood Culture No growth at 4 days    Blood Culture - Blood, Arm, Left [[id number]]  (Normal) Collected: 06/19/24 1455    Lab Status: Preliminary result Specimen: Blood from Arm, Left Updated: 06/23/24 1500     Blood Culture No growth at 4 days    Narrative:      Less than seven (7) mL's of blood was collected.  Insufficient quantity may yield false negative results.    NEHEMIAH AURIS SCREEN - Swab, Axilla Right, Axilla Left and Groin [[id number]]  (Normal) Collected: 06/18/24 1018    Lab Status: Final result Specimen: Swab from Axilla Right, Axilla Left and Groin Updated: 06/23/24 1045     Nehemiah Auris Screen Culture No Candida auris isolated at 5 days    Blood Culture - Blood, Arm, Right [[id number]]  (Normal) Collected: 06/18/24 0200    Lab Status: Final result Specimen: Blood from Arm, Right Updated: 06/23/24 0215     Blood Culture No growth at 5 days    Narrative:      Less than seven (7) mL's of blood was collected.  Insufficient quantity may yield false negative results.            Imaging Results (Last 24 Hours)       ** No results found for the last 24 hours. **                I have reviewed the medications:  Scheduled Meds:enoxaparin, 40 mg, Subcutaneous, Q24H  ertapenem, 1,000 mg, Intravenous, Q24H  lansoprazole, 30 mg, Per G Tube, Q AM  mupirocin, 1 Application, Topical, Q12H  potassium chloride, 40 mEq, Per G Tube, Once  sodium chloride, 10 mL, Intravenous, Q12H  terazosin, 1 mg,  Per G Tube, Nightly      Continuous Infusions:     PRN Meds:.  acetaminophen **OR** acetaminophen **OR** acetaminophen    senna-docusate sodium **AND** polyethylene glycol **AND** bisacodyl **AND** bisacodyl    calcium carbonate    HYDROcodone-acetaminophen    ondansetron ODT **OR** ondansetron    sodium chloride    sodium chloride    Assessment & Plan   Assessment & Plan     Active Hospital Problems    Diagnosis  POA    **UTI (urinary tract infection), bacterial [N39.0, A49.9]  Yes    ARF (acute renal failure) [N17.9]  Yes    Failure to thrive in adult [R62.7]  Yes    Dysphagia [R13.10]  Yes    Generalized weakness [R53.1]  Yes    DISH (disseminated idiopathic skeletal hyperostosis) [M48.10]  Yes    Immobility [Z74.09]  Yes    Generalized pain [R52]  Yes    Hypertension [I10]  Yes      Resolved Hospital Problems   No resolved problems to display.        Brief Hospital Course to date:  Anthony Gallegos is a 79 y.o. male presents the hospital with ESBL urinary tract infection and bacteremia.    Discussion/plan for today:  Sodium has normalized.  Discussing fluid with nephrology today.  Replete potassium today.  Renal function is at baseline.  Variability and anemia likely from dilution.  No bleeding noted.  Length of treatment with ertapenem adjusted today.  Bacterial susceptibilities reviewed.  Continue Forde care.  Treatment plan otherwise as outlined below by my physician.    ESBL E coli UTI and bacteremia-on ertapenem. Anticipate a 7-14 day (day 6 today) course of IV therapy. Repeat blood cultures are negative at 4 days  MARTITA-likely predominantly due to obstructive uropathy. Resolved.  Urinary retention causing bilateral hydronephrosis and MARTITA-he had recently had his long term forde removed prior to discharge. This has been replaced. Urology recommends keeping it until outpatient follow up. Renal ultrasound shows resolution of hydronephrosis. Continue terazosin   Hypernatremia-resolved with d5w per nephrology.  Improved   Metabolic encephalopathy-likely related to some combination of the above, improving  Anemia of chronic disease-hgb 9.2  Chronic dysphagia-on a modified diet and nocturnal tube feeds  Failure to thrive/severe malnutrition-bmp 16.64. Prealbumin 5.1.  GERD-ppi   Ankylosing spondylitis-on methotrexate as an outpatient  Lovenox 40 mg SC daily for DVT prophylaxis.  Full code.  Discussed with patient and nursing staff.  Anticipate discharge  return to [facility]  in 2-3 days.      CODE STATUS:   Code Status and Medical Interventions:   Ordered at: 06/18/24 0231     Code Status (Patient has no pulse and is not breathing):    CPR (Attempt to Resuscitate)     Medical Interventions (Patient has pulse or is breathing):    Full Support       Shola Betancourt MD  06/24/24

## 2024-06-24 NOTE — PROGRESS NOTES
[facility] Progress Note      Patient Name: Anthony Gallegos  : 1945  MRN: [id number]  Primary Care Physician:  Keshav Eason MD  Date of admission: 2024    Subjective     Interval History:   Follow-up on MARTITA on CKD II.      Continues to be confused open eyes to commands.  Mumbling.  Does not answer questions appropriately.    Review of Systems:   Difficult to obtain.    Objective     Vitals:   Temp:  [97.5 °F (36.4 °C)-97.7 °F (36.5 °C)] 97.7 °F (36.5 °C)  Heart Rate:  [89-94] 89  Resp:  [18-20] 18  BP: ()/(54-76) 112/60    Intake/Output Summary (Last 24 hours) at 2024 0909  Last data filed at 2024 0550  Gross per 24 hour   Intake 1480 ml   Output 1050 ml   Net 430 ml       Physical Exam:    General Appearance: Cachectic. No acute distress, chronically ill, confused  Skin: warm and dry  HEENT: oral mucosa dry., nonicteric sclera.  Edentulous.  Neck: No JVD  Lungs: Diminished to auscultation, effort nonlabored  Heart: RRR, no rub, no murmur  Abdomen: soft, nontender, nondistended.  Normoactive bowel, feeding tube taped to left upper quadrant  : Mariscal catheter in place with sediment in bag  Extremities: 1+ edema to left lower extremity, up to thigh.  No muscle mass.      Scheduled Meds:     enoxaparin, 40 mg, Subcutaneous, Q24H  ertapenem, 1,000 mg, Intravenous, Q24H  lansoprazole, 30 mg, Per G Tube, Q AM  mupirocin, 1 Application, Topical, Q12H  potassium chloride, 40 mEq, Per G Tube, Once  sodium chloride, 10 mL, Intravenous, Q12H  terazosin, 1 mg, Per G Tube, Nightly      IV Meds:   dextrose, 75 mL/hr, Last Rate: 75 mL/hr (24 0310)          Results Reviewed:   I have personally reviewed the results from the time of this admission to 2024 09:09 EDT     Results from last 7 days   Lab Units 24  0922 24  0548 24  0434 24  0711 24  0033   SODIUM mmol/L 144 153* 155*   < > 130*   POTASSIUM mmol/L 3.7 3.9 3.7   < > 5.7*    CHLORIDE mmol/L 113* 123* 122*   < > 96*   CO2 mmol/L 22.3 23.0 23.7   < > 20.5*   BUN mg/dL 21 31* 46*   < > 103*   CREATININE mg/dL 0.70* 0.65* 0.82   < > 3.75*   CALCIUM mg/dL 8.2* 10.1 8.6   < > 9.1   BILIRUBIN mg/dL  --   --   --   --  0.6   ALK PHOS U/L  --   --   --   --  78   ALT (SGPT) U/L  --   --   --   --  25   AST (SGOT) U/L  --   --   --   --  34   GLUCOSE mg/dL 81 109* 97   < > 111*    < > = values in this interval not displayed.       Estimated Creatinine Clearance: 71.9 mL/min (A) (by C-G formula based on SCr of 0.7 mg/dL (L)).    Results from last 7 days   Lab Units 06/23/24  0922 06/22/24  0548 06/21/24  0434 06/20/24  0601   MAGNESIUM mg/dL 1.8 1.9  --  2.3   PHOSPHORUS mg/dL 2.4* 2.3* 2.2* 2.2*       Results from last 7 days   Lab Units 06/23/24  0922 06/22/24  0548 06/21/24  0434 06/20/24  0601 06/19/24  0925 06/18/24  0711   URIC ACID mg/dL 7.3* 8.5* 8.7* 9.7* 9.4* 9.1*       Results from last 7 days   Lab Units 06/23/24  0922 06/22/24  0548 06/20/24  0601 06/19/24  0925 06/18/24  0711   WBC 10*3/mm3 6.57 7.22 8.36 7.93 5.17   HEMOGLOBIN g/dL 9.2* 8.9* 9.5* 9.0* 10.4*   PLATELETS 10*3/mm3 284 317 300 264 248             Assessment / Plan     ASSESSMENT:  Acute kidney injury, resolved.  Very poor muscle mass.  Likely due to postobstructive etiology.  Bilateral hydronephrosis and urinary retention.  Now with Mariscal catheter and on terazosin.  Renal function significant improved after Mariscal catheter placement.   Hypernatremia, patient is dehydrated, secondary to free water deficit, sodium down to 144 as of yesterday, getting D5W  ESBL urinary tract infection, ESBL bacteremia. On ertapenem.  Failure to thrive.  Dysphagia on modified diet. Has PEG tube but not currently on tube feeds.  Albumin 2.4  Anemia    PLAN:  No labs today will reorder.  Continue current therapy for the time being awaiting repeat lab  Labs in a.m.    I reviewed the chart and other providers notes, I reviewed labs.  Copied  text in this note has been reviewed and is accurate as of 06/24/24.       Thank you for involving us in the care of Anthony Gallegos.  Please feel free to call with any questions.   Al martinez   06/24/24  09:09 EDT    [facility]  [phone]    Please note that portions of this note were completed with a voice recognition program.  Copied portions of the note reviewed and accurate as of 6/20/2024.

## 2024-06-25 LAB
ALBUMIN SERPL-MCNC: 2.2 G/DL (ref 3.5–5.2)
ANION GAP SERPL CALCULATED.3IONS-SCNC: 8.4 MMOL/L (ref 5–15)
BUN SERPL-MCNC: 17 MG/DL (ref 8–23)
BUN/CREAT SERPL: 29.3 (ref 7–25)
CALCIUM SPEC-SCNC: 7.9 MG/DL (ref 8.6–10.5)
CHLORIDE SERPL-SCNC: 111 MMOL/L (ref 98–107)
CO2 SERPL-SCNC: 21.6 MMOL/L (ref 22–29)
CREAT SERPL-MCNC: 0.58 MG/DL (ref 0.76–1.27)
DEPRECATED RDW RBC AUTO: 44.8 FL (ref 37–54)
EGFRCR SERPLBLD CKD-EPI 2021: 98.6 ML/MIN/1.73
ERYTHROCYTE [DISTWIDTH] IN BLOOD BY AUTOMATED COUNT: 14.5 % (ref 12.3–15.4)
GLUCOSE SERPL-MCNC: 83 MG/DL (ref 65–99)
HCT VFR BLD AUTO: 26.4 % (ref 37.5–51)
HGB BLD-MCNC: 8.4 G/DL (ref 13–17.7)
MCH RBC QN AUTO: 27.3 PG (ref 26.6–33)
MCHC RBC AUTO-ENTMCNC: 31.8 G/DL (ref 31.5–35.7)
MCV RBC AUTO: 85.7 FL (ref 79–97)
PHOSPHATE SERPL-MCNC: 2.6 MG/DL (ref 2.5–4.5)
PLATELET # BLD AUTO: 333 10*3/MM3 (ref 140–450)
PMV BLD AUTO: 9.4 FL (ref 6–12)
POTASSIUM SERPL-SCNC: 3.6 MMOL/L (ref 3.5–5.2)
RBC # BLD AUTO: 3.08 10*6/MM3 (ref 4.14–5.8)
SODIUM SERPL-SCNC: 141 MMOL/L (ref 136–145)
WBC NRBC COR # BLD AUTO: 6.32 10*3/MM3 (ref 3.4–10.8)

## 2024-06-25 PROCEDURE — 25010000002 ERTAPENEM PER 500 MG: Performed by: INTERNAL MEDICINE

## 2024-06-25 PROCEDURE — 25010000002 ENOXAPARIN PER 10 MG: Performed by: STUDENT IN AN ORGANIZED HEALTH CARE EDUCATION/TRAINING PROGRAM

## 2024-06-25 PROCEDURE — 85027 COMPLETE CBC AUTOMATED: CPT | Performed by: INTERNAL MEDICINE

## 2024-06-25 PROCEDURE — 80069 RENAL FUNCTION PANEL: CPT | Performed by: HOSPITALIST

## 2024-06-25 RX ORDER — POTASSIUM CHLORIDE 1.5 G/1.58G
40 POWDER, FOR SOLUTION ORAL ONCE
Status: COMPLETED | OUTPATIENT
Start: 2024-06-25 | End: 2024-06-25

## 2024-06-25 RX ORDER — CASTOR OIL AND BALSAM, PERU 788; 87 MG/G; MG/G
1 OINTMENT TOPICAL EVERY 12 HOURS SCHEDULED
Status: DISCONTINUED | OUTPATIENT
Start: 2024-06-26 | End: 2024-06-28 | Stop reason: HOSPADM

## 2024-06-25 RX ORDER — POTASSIUM CHLORIDE 1.5 G/1.58G
40 POWDER, FOR SOLUTION ORAL 2 TIMES DAILY
Status: DISCONTINUED | OUTPATIENT
Start: 2024-06-25 | End: 2024-06-25

## 2024-06-25 RX ADMIN — POTASSIUM CHLORIDE 40 MEQ: 1.5 POWDER, FOR SOLUTION ORAL at 15:52

## 2024-06-25 RX ADMIN — MUPIROCIN 1 APPLICATION: 20 OINTMENT TOPICAL at 12:00

## 2024-06-25 RX ADMIN — MUPIROCIN 1 APPLICATION: 20 OINTMENT TOPICAL at 21:28

## 2024-06-25 RX ADMIN — ENOXAPARIN SODIUM 40 MG: 100 INJECTION SUBCUTANEOUS at 21:25

## 2024-06-25 RX ADMIN — Medication 10 ML: at 09:42

## 2024-06-25 RX ADMIN — LANSOPRAZOLE 30 MG: 15 TABLET, ORALLY DISINTEGRATING ORAL at 06:31

## 2024-06-25 RX ADMIN — ERTAPENEM SODIUM 1000 MG: 1 INJECTION, POWDER, LYOPHILIZED, FOR SOLUTION INTRAMUSCULAR; INTRAVENOUS at 11:58

## 2024-06-25 RX ADMIN — Medication 10 ML: at 21:26

## 2024-06-25 NOTE — PROGRESS NOTES
Continued Stay Note  [facility]     Patient Name: Anthony Gallegos  MRN: [id number]  Today's Date: 6/25/2024    Admit Date: 6/17/2024    Plan: Return to LTC at [facility]   Discharge Plan       Row Name 06/25/24 1506       Plan    Plan Return to LTC at [facility]    Plan Comments IV fluids DC'd today.  Plan remains to return to [facility] @ DC.  Will need PICC/midline if still has IV antibiotics.  CCP continues to follow.  Joann MOORE                      Expected Discharge Date and Time       Expected Discharge Date Expected Discharge Time    Jun 26, 2024               Becky S. [place], RN

## 2024-06-25 NOTE — PROGRESS NOTES
[facility] Medicine Services  PROGRESS NOTE    Patient Name: Anthony Gallegos  : 1945  MRN: [id number]    Date of Admission: 2024  Primary Care Physician: Keshav Eason MD    Subjective   Subjective     CC:  Follow-up multiple issues and ESBL infection    Subjective:   Patient is again a poor historian and not particularly interested in conversing much today.  He denies any new complaints.       Objective   Objective     Vital Signs:   Temp:  [97.3 °F (36.3 °C)-97.7 °F (36.5 °C)] 97.3 °F (36.3 °C)  Heart Rate:  [] 102  Resp:  [18] 18  BP: ()/(47-58) 107/47        Physical Exam:  Constitutional:Awake, alert, chronically ill-appearing but nontoxic  HENT: NCAT, mucous membranes moist, neck supple  Respiratory: No cough or wheezes, normal respirations, nonlabored breathing   Cardiovascular: Pulse rate is normal, palpable radial pulses  Gastrointestinal: PEG tube, soft, nontender, nondistended  Musculoskeletal: Thin frail with muscle wasting noted, no lower extremity edema, BMI 16  Psychiatric: Calm affect, reasonably cooperative  Neurologic: Poor historian, follows commands  Skin: No rashes or jaundice, warm      Results Reviewed:  Results from last 7 days   Lab Units 24  0647 24  0936 24  0922   WBC 10*3/mm3 6.32 7.85 6.57   HEMOGLOBIN g/dL 8.4* 8.4* 9.2*   HEMATOCRIT % 26.4* 27.3* 29.6*   PLATELETS 10*3/mm3 333 314 284     Results from last 7 days   Lab Units 24  0647 24  0936 24  0922   SODIUM mmol/L 141 137 144   POTASSIUM mmol/L 3.6 3.4* 3.7   CHLORIDE mmol/L 111* 110* 113*   CO2 mmol/L 21.6* 20.3* 22.3   BUN mg/dL 17 18 21   CREATININE mg/dL 0.58* 0.66* 0.70*   GLUCOSE mg/dL 83 105* 81   CALCIUM mg/dL 7.9* 7.9* 8.2*     Estimated Creatinine Clearance: 102 mL/min (A) (by C-G formula based on SCr of 0.58 mg/dL (L)).    Microbiology Results Abnormal       Procedure Component Value - Date/Time    Blood Culture - Blood, Arm, Right [[id number]]  (Normal)  Collected: 06/19/24 1455    Lab Status: Final result Specimen: Blood from Arm, Right Updated: 06/24/24 1515     Blood Culture No growth at 5 days    Blood Culture - Blood, Arm, Left [[id number]]  (Normal) Collected: 06/19/24 1455    Lab Status: Final result Specimen: Blood from Arm, Left Updated: 06/24/24 1500     Blood Culture No growth at 5 days    Narrative:      Less than seven (7) mL's of blood was collected.  Insufficient quantity may yield false negative results.    NEHEMIAH AURIS SCREEN - Swab, Axilla Right, Axilla Left and Groin [[id number]]  (Normal) Collected: 06/18/24 1018    Lab Status: Final result Specimen: Swab from Axilla Right, Axilla Left and Groin Updated: 06/23/24 1045     Nehemiah Auris Screen Culture No Candida auris isolated at 5 days    Blood Culture - Blood, Arm, Right [[id number]]  (Normal) Collected: 06/18/24 0200    Lab Status: Final result Specimen: Blood from Arm, Right Updated: 06/23/24 0215     Blood Culture No growth at 5 days    Narrative:      Less than seven (7) mL's of blood was collected.  Insufficient quantity may yield false negative results.            Imaging Results (Last 24 Hours)       ** No results found for the last 24 hours. **                I have reviewed the medications:  Scheduled Meds:enoxaparin, 40 mg, Subcutaneous, Q24H  ertapenem, 1,000 mg, Intravenous, Q24H  lansoprazole, 30 mg, Per G Tube, Q AM  mupirocin, 1 Application, Topical, Q12H  sodium chloride, 10 mL, Intravenous, Q12H  terazosin, 1 mg, Per G Tube, Nightly      Continuous Infusions:     PRN Meds:.  acetaminophen **OR** acetaminophen **OR** acetaminophen    senna-docusate sodium **AND** polyethylene glycol **AND** bisacodyl **AND** bisacodyl    calcium carbonate    HYDROcodone-acetaminophen    ondansetron ODT **OR** ondansetron    sodium chloride    sodium chloride    Assessment & Plan   Assessment & Plan     Active Hospital Problems    Diagnosis  POA    **UTI (urinary tract infection), bacterial [N39.0,  A49.9]  Yes    ARF (acute renal failure) [N17.9]  Yes    Failure to thrive in adult [R62.7]  Yes    Dysphagia [R13.10]  Yes    Generalized weakness [R53.1]  Yes    DISH (disseminated idiopathic skeletal hyperostosis) [M48.10]  Yes    Immobility [Z74.09]  Yes    Generalized pain [R52]  Yes    Hypertension [I10]  Yes      Resolved Hospital Problems   No resolved problems to display.        Brief Hospital Course to date:  Anthony Gallegos is a 79 y.o. male presents the hospital with ESBL urinary tract infection and bacteremia.    Discussion/plan for today:  Agree with nephrology we will discontinue IV fluid and patient likely needs more free water at night.  Hopefully this will be a good long-term solution in order to avoid further hyponatremia on outpatient basis as his oral intake seems relatively limited.  Consult infectious disease to weigh in on length of treatment with ertapenem for bacteremia and UTI.  Patient reportedly on methotrexate outpatient so is immunosuppressed unclear how much this needs to affect his recommended length of treatment.  Replete potassium today.  Renal function is at baseline.  Variability and anemia likely from dilution, stable today.  No bleeding noted.  Bacterial susceptibilities reviewed.  Continue Forde care.  Treatment plan otherwise as outlined below by my physician.    ESBL E coli UTI and bacteremia-on ertapenem. Anticipate a 7-14 day course of IV therapy. Repeat blood cultures are negative at 4 days  MARTITA-likely predominantly due to obstructive uropathy. Resolved.  Urinary retention causing bilateral hydronephrosis and MARTITA-he had recently had his long term forde removed prior to discharge. This has been replaced. Urology recommends keeping it until outpatient follow up. Renal ultrasound shows resolution of hydronephrosis. Continue terazosin   Hypernatremia-resolved with d5w per nephrology. Improved   Metabolic encephalopathy-likely related to some combination of the above,  improving  Anemia of chronic disease-hgb 9.2  Chronic dysphagia-on a modified diet and nocturnal tube feeds  Failure to thrive/severe malnutrition-bmp 16.64. Prealbumin 5.1.  GERD-ppi   Ankylosing spondylitis-on methotrexate as an outpatient  Lovenox 40 mg SC daily for DVT prophylaxis.  Full code.  Discussed with patient and nursing staff.  Anticipate discharge  return to [facility]  in 1-3 days.      CODE STATUS:   Code Status and Medical Interventions:   Ordered at: 06/18/24 0231     Code Status (Patient has no pulse and is not breathing):    CPR (Attempt to Resuscitate)     Medical Interventions (Patient has pulse or is breathing):    Full Support       Shola Betancourt MD  06/25/24

## 2024-06-25 NOTE — PROGRESS NOTES
"Nutrition Services    Patient Name:  Anthony Gallegos  YOB: 1945  MRN: [id number]  Admit Date:  6/17/2024    Assessment Date:  06/25/24    Summary: TF Follow up note  Nocturnal TF's 8p-8a at 45 mL/hr Novasource Renal - tolerated at goal rate last night. Water flushes 50 mL q 4 hrs, adjusted by nephrology this morning. Spoke to RN, very poor po intakes. May need to adjust tube feed rates to better meet needs if po intakes do not increase. Will monitor closely and adjust pending POC. Labs reviewed, skin status reviewed.    RECS:   Nocturnal feeds from 8p to 8a of Novasource Renal at 45 ml/hr to provide 1080 kcals, 49 g/protein, and 387 ml free water. Nephrology to manage free water flushes.      RD to follow tolerance, labs and clinical course.    CLINICAL NUTRITION ASSESSMENT      Reason for Assessment Follow-up Protocol     Diagnosis/Problem   UTI  ARF  Failure to thrive  Dysphagia with feeding tube (though apparently eats modified diet)  Generalized weakness     Anthropometrics        Current Height  Current Weight  BMI kg/m2 Height: 190.5 cm (75\")  Weight: 71 kg (156 lb 8.4 oz) (06/25/24 0545)  Body mass index is 19.56 kg/m².   Adjusted BMI (if applicable)    BMI Category Underweight (18.4 or below)   Ideal Body Weight (IBW) 186 lbs   Usual Body Weight (UBW) unknown   Weight Trend Loss, Amount/Timeframe: 23.1% wt loss x 3 months   --  Labs       Pertinent Labs    Results from last 7 days   Lab Units 06/25/24  0647 06/24/24  0936 06/23/24  0922   SODIUM mmol/L 141 137 144   POTASSIUM mmol/L 3.6 3.4* 3.7   CHLORIDE mmol/L 111* 110* 113*   CO2 mmol/L 21.6* 20.3* 22.3   BUN mg/dL 17 18 21   CREATININE mg/dL 0.58* 0.66* 0.70*   CALCIUM mg/dL 7.9* 7.9* 8.2*   GLUCOSE mg/dL 83 105* 81     Results from last 7 days   Lab Units 06/25/24  0647 06/24/24  0936 06/23/24  0922 06/22/24  0548 06/21/24  0434 06/20/24  0601   MAGNESIUM mg/dL  --  1.7 1.8 1.9  --  2.3   PHOSPHORUS mg/dL 2.6 2.5 2.4* 2.3*   < > 2.2* "   HEMOGLOBIN g/dL 8.4* 8.4* 9.2* 8.9*  --  9.5*   HEMATOCRIT % 26.4* 27.3* 29.6* 27.9*  --  28.0*   WBC 10*3/mm3 6.32 7.85 6.57 7.22  --  8.36   ALBUMIN g/dL 2.2* 2.2* 2.4* 2.5*   < > 2.6*   PREALBUMIN mg/dL  --   --   --   --   --  5.1*    < > = values in this interval not displayed.     Results from last 7 days   Lab Units 06/25/24  0647 06/24/24  0936 06/23/24  0922 06/22/24  0548 06/20/24  0601   PLATELETS 10*3/mm3 333 314 284 317 300     COVID19   Date Value Ref Range Status   03/25/2024 Not Detected Not Detected - Ref. Range Final     Lab Results   Component Value Date    HGBA1C 5.80 (H) 01/23/2024          Medications           Scheduled Medications enoxaparin, 40 mg, Subcutaneous, Q24H  ertapenem, 1,000 mg, Intravenous, Q24H  lansoprazole, 30 mg, Per G Tube, Q AM  mupirocin, 1 Application, Topical, Q12H  potassium chloride, 40 mEq, Per G Tube, Once  sodium chloride, 10 mL, Intravenous, Q12H  terazosin, 1 mg, Per G Tube, Nightly       Infusions sodium chloride, 50 mL/hr, Last Rate: 50 mL/hr (06/24/24 1443)       PRN Medications   acetaminophen **OR** acetaminophen **OR** acetaminophen    senna-docusate sodium **AND** polyethylene glycol **AND** bisacodyl **AND** bisacodyl    calcium carbonate    HYDROcodone-acetaminophen    ondansetron ODT **OR** ondansetron    sodium chloride    sodium chloride     Physical Findings          General Findings disoriented, hard of hearing, impaired speech   Oral/Mouth Cavity tooth or teeth missing   Edema  lower extremity , 1+ (trace), 2+ (mild)   Gastrointestinal fecal incontinence   Skin  pressure injury: lumbar spine - stage 1, R scapula - stage 1, R lateral foot - deep tissue, R ankle - deep tissue, R heel - deep tissue, Bilateral coccyx - stage 4, L posterior heel - stage 4   Tubes/Drains/Lines PEG   NFPE See Malnutrition Severity Assessment     Malnutrition Severity Assessment      Patient meets criteria for : Severe Malnutrition         Current Nutrition Orders &  Evaluation of Intake       Oral Nutrition     Food Allergies NKFA   Current PO Diet Diet: Regular/House; Texture: Pureed (NDD 1); Fluid Consistency: Honey Thick   Supplement n/a   PO Evaluation     % PO Intake Limited po intake recorded in EMR    Factors Affecting Intake: swallow impairment     Estimated Requirements         Weight used  63.1 kg    Calories  2209 kcals (35 kcal/kg)    Protein  95 - 126 g (1.5 - 2.0 gm/kg)    Fluid   (Defer to physician)     PES STATEMENT / NUTRITION DIAGNOSIS      Nutrition Dx Problem  Problem: Malnutrition (severe)  Etiology: Medical Diagnosis - History of ankylosing spondylitis, MARTITA, HTN, chronic pain, silent aspiration and Factors Affecting Nutrition - swallow impairment, AMS    Signs/Symptoms: NFPE Results, BMI, Unintended Weight Change, and Report/Observation     NUTRITION INTERVENTION / PLAN OF CARE      Intervention Goal(s) Establish goals of care, Meet estimated needs, Initiate feeding/diet, Initiate TF/PN, No significant weight loss, and Appropriate weight gain         RD Intervention/Action Await initiation/advancement of PO diet, Await initiation of EN/PN, and Continue to monitor   --      Prescription/Orders:       PO Diet       Supplements       Enteral Nutrition Enteral Prescription:      Enteral Route PEG    TF Delivery Method Cyclic 8p-8a    Enteral Product Novasource Renal    Modular None    Propofol Rate/Kcal      TF Start Rate/Volume  20    TF Goal Rate/Volume 45 mL     Free Water Flush 50 mL q 4    TF Provision at Goal:           Calories 1080 kcal, meets 49% of needs         Protein  49 gm protein, meets 51 % needs         Fluid (mL) 387 ml free water + 150 mL water flushes    Prescription Ordered No, recommended         Parenteral Nutrition    New Prescription Ordered? Continue same per protocol, No changes at this time   --      Monitor/Evaluation Per protocol   Discharge Plan/Needs Pending clinical course   --    RD to follow per protocol.      Electronically  signed by:  Mindy Rowe  06/25/24 09:37 EDT

## 2024-06-25 NOTE — PLAN OF CARE
Problem: Adult Inpatient Plan of Care  Goal: Plan of Care Review  Outcome: Ongoing, Progressing  Flowsheets (Taken 6/25/2024 3094)  Progress: improving  Plan of Care Reviewed With: patient   Goal Outcome Evaluation:  Patient rested well overnight. No complaints. A&O to self. IVFs continue. IV antibiotics continue. Nocturnal tube feeds given. Wound care performed. Q2 turn. Refused at times. VSS. All needs met.

## 2024-06-25 NOTE — PROGRESS NOTES
[facility] Progress Note      Patient Name: Anthony Gallegos  : 1945  MRN: [id number]  Primary Care Physician:  Keshav Eason MD  Date of admission: 2024    Subjective     Interval History:   Acute kidney injury on CKD 2.  And output 1175 for 1 shift.  Tube feeds not recorded.  Review of Systems:   As noted above    Objective     Vitals:   Temp:  [97.4 °F (36.3 °C)-97.7 °F (36.5 °C)] 97.7 °F (36.5 °C)  Heart Rate:  [83-95] 95  Resp:  [18] 18  BP: ()/(54-59) 111/58    Intake/Output Summary (Last 24 hours) at 2024 1008  Last data filed at 2024 0900  Gross per 24 hour   Intake 1073 ml   Output 1175 ml   Net -102 ml       Physical Exam:    General Appearance: Cachectic.  Sleeping.  Does awaken briefly but goes back to sleep.  Skin: warm and dry  HEENT: oral mucosa dry., nonicteric sclera.  Edentulous.  Neck: supple, no JVD  Lungs: Clear to auscultation bilaterally.  Heart: RRR, normal S1 and S2  Abdomen: soft, nontender, nondistended. +bs. PEG  : Mariscal catheter  Extremities: No edema.  No muscle mass.      Scheduled Meds:     enoxaparin, 40 mg, Subcutaneous, Q24H  ertapenem, 1,000 mg, Intravenous, Q24H  lansoprazole, 30 mg, Per G Tube, Q AM  mupirocin, 1 Application, Topical, Q12H  potassium chloride, 40 mEq, Per G Tube, Once  sodium chloride, 10 mL, Intravenous, Q12H  terazosin, 1 mg, Per G Tube, Nightly      IV Meds:   sodium chloride, 50 mL/hr, Last Rate: 50 mL/hr (24 1443)          Results Reviewed:   I have personally reviewed the results from the time of this admission to 2024 10:08 EDT     Results from last 7 days   Lab Units 24  0647 24  0936 24  0922   SODIUM mmol/L 141 137 144   POTASSIUM mmol/L 3.6 3.4* 3.7   CHLORIDE mmol/L 111* 110* 113*   CO2 mmol/L 21.6* 20.3* 22.3   BUN mg/dL 17 18 21   CREATININE mg/dL 0.58* 0.66* 0.70*   CALCIUM mg/dL 7.9* 7.9* 8.2*   GLUCOSE mg/dL 83 105* 81       Estimated Creatinine Clearance:  102 mL/min (A) (by C-G formula based on SCr of 0.58 mg/dL (L)).    Results from last 7 days   Lab Units 06/25/24  0647 06/24/24  0936 06/23/24  0922 06/22/24  0548   MAGNESIUM mg/dL  --  1.7 1.8 1.9   PHOSPHORUS mg/dL 2.6 2.5 2.4* 2.3*       Results from last 7 days   Lab Units 06/24/24  0936 06/23/24  0922 06/22/24  0548 06/21/24  0434 06/20/24  0601 06/19/24  0925   URIC ACID mg/dL 6.5 7.3* 8.5* 8.7* 9.7* 9.4*       Results from last 7 days   Lab Units 06/25/24  0647 06/24/24  0936 06/23/24  0922 06/22/24  0548 06/20/24  0601   WBC 10*3/mm3 6.32 7.85 6.57 7.22 8.36   HEMOGLOBIN g/dL 8.4* 8.4* 9.2* 8.9* 9.5*   PLATELETS 10*3/mm3 333 314 284 317 300             Assessment / Plan     ASSESSMENT:  Acute kidney injury on CKD 2.  Very poor muscle mass.  Likely due to obstruction.  Bilateral hydronephrosis and urinary retention.  Now with Mariscal catheter and on terazosin.  (Terazosin held at times for hypotension.)  Significant improvement with Mariscal catheter placement.  Creatinine normal at 0.58.  Sodium normal on current free water.  Continue IV fluids.  2.  ESBL urinary tract infection, ESBL bacteremia.  On ertapenem.  3.  Failure to thrive.  4.  Dysphagia on modified diet.  Has PEG tube on at bedtime tube feeds.  Albumin 2.2  Prealbumin 5.1.  5.  Anemia  PLAN:  DC IV normal saline.  2.  Add some free water to tube feeds at night.  Thank you for involving us in the care of Anthony Gallegos.  Please feel free to call with any questions.    Tosha Cummings MD  06/25/24  10:08 EDT    [facility]  [phone]    Please note that portions of this note were completed with a voice recognition program.  Copied portions of the note reviewed and accurate as of 6/20/2024.

## 2024-06-26 LAB
ALBUMIN SERPL-MCNC: 2.2 G/DL (ref 3.5–5.2)
ANION GAP SERPL CALCULATED.3IONS-SCNC: 4.4 MMOL/L (ref 5–15)
BUN SERPL-MCNC: 16 MG/DL (ref 8–23)
BUN/CREAT SERPL: 29.1 (ref 7–25)
CALCIUM SPEC-SCNC: 8.4 MG/DL (ref 8.6–10.5)
CHLORIDE SERPL-SCNC: 115 MMOL/L (ref 98–107)
CO2 SERPL-SCNC: 21.6 MMOL/L (ref 22–29)
CREAT SERPL-MCNC: 0.55 MG/DL (ref 0.76–1.27)
DEPRECATED RDW RBC AUTO: 44.1 FL (ref 37–54)
EGFRCR SERPLBLD CKD-EPI 2021: 100.2 ML/MIN/1.73
ERYTHROCYTE [DISTWIDTH] IN BLOOD BY AUTOMATED COUNT: 14.4 % (ref 12.3–15.4)
GLUCOSE SERPL-MCNC: 107 MG/DL (ref 65–99)
HCT VFR BLD AUTO: 25.7 % (ref 37.5–51)
HGB BLD-MCNC: 8.2 G/DL (ref 13–17.7)
MAGNESIUM SERPL-MCNC: 1.6 MG/DL (ref 1.6–2.4)
MCH RBC QN AUTO: 27.3 PG (ref 26.6–33)
MCHC RBC AUTO-ENTMCNC: 31.9 G/DL (ref 31.5–35.7)
MCV RBC AUTO: 85.7 FL (ref 79–97)
PHOSPHATE SERPL-MCNC: 2.5 MG/DL (ref 2.5–4.5)
PLATELET # BLD AUTO: 330 10*3/MM3 (ref 140–450)
PMV BLD AUTO: 9.1 FL (ref 6–12)
POTASSIUM SERPL-SCNC: 3.4 MMOL/L (ref 3.5–5.2)
POTASSIUM SERPL-SCNC: 4.1 MMOL/L (ref 3.5–5.2)
RBC # BLD AUTO: 3 10*6/MM3 (ref 4.14–5.8)
SODIUM SERPL-SCNC: 141 MMOL/L (ref 136–145)
WBC NRBC COR # BLD AUTO: 6.32 10*3/MM3 (ref 3.4–10.8)

## 2024-06-26 PROCEDURE — 84132 ASSAY OF SERUM POTASSIUM: CPT | Performed by: INTERNAL MEDICINE

## 2024-06-26 PROCEDURE — 83735 ASSAY OF MAGNESIUM: CPT | Performed by: INTERNAL MEDICINE

## 2024-06-26 PROCEDURE — 85027 COMPLETE CBC AUTOMATED: CPT | Performed by: INTERNAL MEDICINE

## 2024-06-26 PROCEDURE — 92526 ORAL FUNCTION THERAPY: CPT

## 2024-06-26 PROCEDURE — 80069 RENAL FUNCTION PANEL: CPT | Performed by: INTERNAL MEDICINE

## 2024-06-26 PROCEDURE — 25010000002 ENOXAPARIN PER 10 MG: Performed by: STUDENT IN AN ORGANIZED HEALTH CARE EDUCATION/TRAINING PROGRAM

## 2024-06-26 PROCEDURE — 25010000002 ERTAPENEM PER 500 MG: Performed by: INTERNAL MEDICINE

## 2024-06-26 PROCEDURE — 99223 1ST HOSP IP/OBS HIGH 75: CPT | Performed by: INTERNAL MEDICINE

## 2024-06-26 RX ORDER — POTASSIUM CHLORIDE 1.5 G/1.58G
40 POWDER, FOR SOLUTION ORAL EVERY 4 HOURS
Status: COMPLETED | OUTPATIENT
Start: 2024-06-26 | End: 2024-06-26

## 2024-06-26 RX ADMIN — POTASSIUM CHLORIDE 40 MEQ: 1.5 POWDER, FOR SOLUTION ORAL at 12:24

## 2024-06-26 RX ADMIN — POTASSIUM CHLORIDE 40 MEQ: 1.5 POWDER, FOR SOLUTION ORAL at 09:03

## 2024-06-26 RX ADMIN — MUPIROCIN 1 APPLICATION: 20 OINTMENT TOPICAL at 09:03

## 2024-06-26 RX ADMIN — LANSOPRAZOLE 30 MG: 15 TABLET, ORALLY DISINTEGRATING ORAL at 06:37

## 2024-06-26 RX ADMIN — CASTOR OIL AND BALSAM, PERU 1 APPLICATION: 788; 87 OINTMENT TOPICAL at 21:20

## 2024-06-26 RX ADMIN — MUPIROCIN 1 APPLICATION: 20 OINTMENT TOPICAL at 21:17

## 2024-06-26 RX ADMIN — Medication 10 ML: at 09:04

## 2024-06-26 RX ADMIN — ERTAPENEM SODIUM 1000 MG: 1 INJECTION, POWDER, LYOPHILIZED, FOR SOLUTION INTRAMUSCULAR; INTRAVENOUS at 12:24

## 2024-06-26 RX ADMIN — Medication 10 ML: at 21:16

## 2024-06-26 RX ADMIN — CASTOR OIL AND BALSAM, PERU 1 APPLICATION: 788; 87 OINTMENT TOPICAL at 09:03

## 2024-06-26 RX ADMIN — CASTOR OIL AND BALSAM, PERU 1 APPLICATION: 788; 87 OINTMENT TOPICAL at 01:40

## 2024-06-26 RX ADMIN — ENOXAPARIN SODIUM 40 MG: 100 INJECTION SUBCUTANEOUS at 21:15

## 2024-06-26 NOTE — PLAN OF CARE
Problem: Adult Inpatient Plan of Care  Goal: Plan of Care Review  Outcome: Ongoing, Progressing  Goal: Patient-Specific Goal (Individualized)  Outcome: Ongoing, Progressing  Goal: Absence of Hospital-Acquired Illness or Injury  Outcome: Ongoing, Progressing  Intervention: Identify and Manage Fall Risk  Recent Flowsheet Documentation  Taken 6/26/2024 1800 by Alessandra Noel RN  Safety Promotion/Fall Prevention: safety round/check completed  Taken 6/26/2024 1600 by Alessandra Noel RN  Safety Promotion/Fall Prevention: safety round/check completed  Taken 6/26/2024 1400 by Alessandra Noel RN  Safety Promotion/Fall Prevention: safety round/check completed  Taken 6/26/2024 1200 by Alessandra Noel RN  Safety Promotion/Fall Prevention: safety round/check completed  Taken 6/26/2024 1000 by Alessandra Noel RN  Safety Promotion/Fall Prevention: safety round/check completed  Taken 6/26/2024 0800 by Alessandra Noel RN  Safety Promotion/Fall Prevention: safety round/check completed  Intervention: Prevent Skin Injury  Recent Flowsheet Documentation  Taken 6/26/2024 1800 by Alessandra Noel RN  Body Position:   turned   right  Taken 6/26/2024 1600 by Alessandra Noel RN  Body Position:   supine   weight shifting  Taken 6/26/2024 1400 by Alessandra Noel RN  Body Position:   weight shifting   supine  Taken 6/26/2024 1200 by Alessandra Noel RN  Body Position:   weight shifting   supine  Taken 6/26/2024 1000 by Alessandra Noel RN  Body Position:   tilted   left  Taken 6/26/2024 0800 by Alessandra Noel RN  Body Position:   turned   right  Intervention: Prevent and Manage VTE (Venous Thromboembolism) Risk  Recent Flowsheet Documentation  Taken 6/26/2024 1800 by Alessandra Noel RN  Activity Management: activity encouraged  Taken 6/26/2024 1600 by Alessandra Noel RN  Activity Management: activity encouraged  Taken 6/26/2024 1400 by Alessandra Noel RN  Activity Management: activity encouraged  Taken 6/26/2024 1200 by Alessandra Noel  RN  Activity Management: activity encouraged  Taken 6/26/2024 1000 by Alessandra Noel RN  Activity Management: activity encouraged  Taken 6/26/2024 0900 by Alessandra Noel RN  VTE Prevention/Management:   bilateral   patient refused intervention   sequential compression devices off  Range of Motion: active ROM (range of motion) encouraged  Taken 6/26/2024 0800 by Alessandra Noel RN  Activity Management: activity encouraged  Intervention: Prevent Infection  Recent Flowsheet Documentation  Taken 6/26/2024 1800 by Alessandra Noel RN  Infection Prevention: hand hygiene promoted  Taken 6/26/2024 1600 by Alessandra Noel RN  Infection Prevention: hand hygiene promoted  Taken 6/26/2024 1400 by Alessandra Noel RN  Infection Prevention: hand hygiene promoted  Taken 6/26/2024 1200 by Alessandra Noel RN  Infection Prevention: hand hygiene promoted  Taken 6/26/2024 1000 by Alessandra Noel RN  Infection Prevention: hand hygiene promoted  Taken 6/26/2024 0800 by Alessandra Noel RN  Infection Prevention: hand hygiene promoted  Goal: Optimal Comfort and Wellbeing  Outcome: Ongoing, Progressing  Intervention: Provide Person-Centered Care  Recent Flowsheet Documentation  Taken 6/26/2024 0900 by Alessandra Noel RN  Trust Relationship/Rapport:   care explained   choices provided  Goal: Readiness for Transition of Care  Outcome: Ongoing, Progressing   Goal Outcome Evaluation:

## 2024-06-26 NOTE — CONSULTS
"Referring Provider: Dr Betancourt    Reason for Consultation: \"Length of antibiotic treatment for ESBL infection \"    History of present illness:  Anthony Gallegos is a 79 y.o. who I am asked to evaluate and give opinion for \"Length of antibiotic treatment for ESBL infection.\" History is obtained from the patient's RN and chart.  He has a past medical history of ankylosing spondylitis and uveitis for which she takes methotrexate.  He also has a past medical history of BPH with chronic Mariscal and dementia.  Our group saw him for acute cystitis due to ESBL E. coli approximately 3 months ago.  He was treated with ertapenem while in house and then it was recommended that he complete a 7-day course with Bactrim at the time of discharge.    He presented to this hospital approximately 9 days ago from his living facility due to body pain and abnormal speech.  However, it sounds like later after discussion with his son that the speech was in its normal pattern.  There were no alleviating factors to his symptoms.  In the emergency room he was afebrile but tachycardic.  Due to concerns for urinary tract infection he was started on ertapenem.  CT showed bladder distention and hydroureteronephrosis consistent with urinary retention.  A catheter was placed and 1.2 L of urine was removed.    His blood and urine cultures turned positive for ESBL E. coli.  ID has been asked to evaluate.  He had repeat blood cultures drawn on 6/19/2024 and these are negative to date.  As of the writing of this note, he has received 8 days of ertapenem.  He is afebrile and his WBC is normal.    Past Medical History:   Diagnosis Date    Abscess of scrotum     MARTITA (acute kidney injury)     Altered mental state     Arthritis     AS (ankylosing spondylitis)     History of MRSA infection     Hypertension     Leukocytosis     Tetrahydrocannabinol (THC) use disorder, mild, abuse 12/20/2023    Uveitis        Past Surgical History:   Procedure Laterality Date    " COLONOSCOPY      ENDOSCOPY W/ PEG TUBE PLACEMENT N/A 3/29/2024    Procedure: ESOPHAGOGASTRODUODENOSCOPY WITH PERCUTANEOUS ENDOSCOPIC GASTROSTOMY TUBE INSERTION;  Surgeon: Khadar Broderick MD;  Location: [facility] ENDOSCOPY;  Service: General;  Laterality: N/A;  PRE/POST - DYSPHAGIA    ROTATOR CUFF REPAIR Right     TOTAL HIP ARTHROPLASTY Left 2014     Antibiotic allergies and intolerances:  None    Medications:    Current Facility-Administered Medications:     acetaminophen (TYLENOL) tablet 650 mg, 650 mg, Oral, Q4H PRN **OR** acetaminophen (TYLENOL) 160 MG/5ML oral solution 650 mg, 650 mg, Oral, Q4H PRN **OR** acetaminophen (TYLENOL) suppository 650 mg, 650 mg, Rectal, Q4H PRN, Rosa Cormier APRN    sennosides-docusate (PERICOLACE) 8.6-50 MG per tablet 2 tablet, 2 tablet, Oral, BID PRN **AND** polyethylene glycol (MIRALAX) packet 17 g, 17 g, Oral, Daily PRN **AND** bisacodyl (DULCOLAX) EC tablet 5 mg, 5 mg, Oral, Daily PRN **AND** bisacodyl (DULCOLAX) suppository 10 mg, 10 mg, Rectal, Daily PRN, Rosa Cormier APRN    calcium carbonate (TUMS) chewable tablet 500 mg (200 mg elemental), 2 tablet, Oral, BID PRN, Rosa Cormier APRN    castor oil-balsam peru (VENELEX) ointment 1 Application, 1 Application, Topical, Q12H, Nohelia Tomlin APRN, 1 Application at 06/26/24 0140    Enoxaparin Sodium (LOVENOX) syringe 40 mg, 40 mg, Subcutaneous, Q24H, Gerson Townsend MD, 40 mg at 06/25/24 2125    ertapenem (INVanz) 1,000 mg in sodium chloride 0.9 % 100 mL MBP, 1,000 mg, Intravenous, Q24H, Shola Betancourt MD, Last Rate: 200 mL/hr at 06/25/24 1158, 1,000 mg at 06/25/24 1158    HYDROcodone-acetaminophen (NORCO) 5-325 MG per tablet 1 tablet, 1 tablet, Oral, Q6H PRN, Rosa Cormier APRN    lansoprazole (PREVACID SOLUTAB) disintegrating tablet Tablet Delayed Release Dispersible 30 mg, 30 mg, Per G Tube, Q AM, Conor Balbuena MD, 30 mg at 06/26/24 0637    mupirocin (BACTROBAN) 2 %  ointment 1 Application, 1 Application, Topical, Q12H, Gerson Townsend MD, 1 Application at 06/25/24 2128    ondansetron ODT (ZOFRAN-ODT) disintegrating tablet 4 mg, 4 mg, Oral, Q6H PRN **OR** ondansetron (ZOFRAN) injection 4 mg, 4 mg, Intravenous, Q6H PRN, Rosa Cormier APRN    potassium chloride (KLOR-CON) packet 40 mEq, 40 mEq, Oral, Q4H, Shola Betancourt MD    Potassium Replacement - Follow Nurse / BPA Driven Protocol, , Does not apply, PRN, Shola Betancourt MD    sodium chloride 0.9 % flush 10 mL, 10 mL, Intravenous, Q12H, Rosa Cormier APRN, 10 mL at 06/25/24 2126    sodium chloride 0.9 % flush 10 mL, 10 mL, Intravenous, PRN, Rosa Cormier APRN    sodium chloride 0.9 % infusion 40 mL, 40 mL, Intravenous, PRN, Rosa Cormier APRN    terazosin (HYTRIN) capsule 1 mg, 1 mg, Per G Tube, Nightly, Conor Balbuena MD, 1 mg at 06/23/24 2124      Objective   Vital Signs   Temp:  [97.2 °F (36.2 °C)-97.7 °F (36.5 °C)] 97.2 °F (36.2 °C)  Heart Rate:  [] 86  Resp:  [18-20] 20  BP: ()/(46-68) 94/68    Physical Exam:   General: Sitting up in bed but wants to fall back asleep during interview; it appears he has been eating some of his breakfast  Eyes: no scleral icterus  Cardiovascular: NR  Respiratory: normal work of breathing on ambient air  GI: Abdomen with feeding tube present  :  + Mariscal catheter  Vasc: PIV w/o erythema    Labs:     Lab Results   Component Value Date    WBC 6.32 06/26/2024    HGB 8.2 (L) 06/26/2024    HCT 25.7 (L) 06/26/2024    MCV 85.7 06/26/2024     06/26/2024       Lab Results   Component Value Date    GLUCOSE 107 (H) 06/26/2024    BUN 16 06/26/2024    CREATININE 0.55 (L) 06/26/2024    EGFRIFNONA 69 03/27/2018    EGFRIFAFRI 84 03/27/2018    BCR 29.1 (H) 06/26/2024    CO2 21.6 (L) 06/26/2024    CALCIUM 8.4 (L) 06/26/2024    PROTENTOTREF 7.7 03/27/2018    ALBUMIN 2.2 (L) 06/26/2024    LABIL2 1.1 03/27/2018    AST 34 06/18/2024    ALT  25 06/18/2024       Microbiology:  6/18 urine culture: Greater than 1000 CFU ESBL E. coli (susceptible to ertapenem, nitrofurantoin, Zosyn, and Bactrim)    6/18 blood cultures ESBL E. coli in 1 of 2 sets    6/19 blood cultures: Negative    Radiology:  6/21 renal ultrasound negative for hydronephrosis    6/18 CT abdomen/pelvis with dilation of the urinary bladder with bladder wall thickening and mild to moderate bilateral hydronephrosis and hydroureter.  No ureterolithiasis seen.  Likely urinary retention    ASSESSMENT/PLAN:  ESBL E. coli secondary to urinary source  Urinary retention  Hydroureteronephrosis  BPH  Ankylosing spondylitis  Dementia    At this point, he has completed 8 days of ertapenem therapy and has cleared his blood cultures.  He is afebrile with a normal WBC.  He has a dose of ertapenem scheduled for noon and I will have him receive that dose and after that we will discontinue the ertapenem.    Thank you for allowing me to be involved in the care of this patient. Infectious diseases will sign off at this time with antibiotics plan in place, but please call me at [phone] if any further ID questions or new ID concerns.

## 2024-06-26 NOTE — PLAN OF CARE
Goal Outcome Evaluation:  Plan of Care Reviewed With: patient        Progress: no change  Outcome Evaluation: BP was low, but improved.  other vitals stable.  pt denied pain and did not appear to be in any pain.  meds given per orders.  forde catheter and PEG tube in place.  wound care completed per orders.  isolation precautions maintained.  will continue to monitor.

## 2024-06-26 NOTE — CASE MANAGEMENT/SOCIAL WORK
Post-Acute Authorization Submission      Post Acute Pre-Cert Documentation  Request Submitted by Facility - Type:: Post Acute  Post-Acute Authorization Type Submitted:: SNF  Date Post Acute Pre-Cert Inititated per Facility: 06/21/24  Accepting Facility: [facility] Discharge Date Requested: 06/26/24  All Clinicals Submitted?: Yes  Had Accepting Facility at Time of Submission: Yes  Response Received from Insurance?: P2P Requested  Response Communicated to::   Authorization Number:: PENDING [id number]  Post Acute Pre-Cert Initiated Comment: P2P SETUP FOR [facility]REW BRAND/IVET BRAND/REILLY MCR AT PH# [phone] OPT. 4; WILL CONTINUE TO FOLLOW              RADHA Hassan

## 2024-06-26 NOTE — THERAPY TREATMENT NOTE
Acute Care - Speech Language Pathology   Swallow Treatment Note [facility]     Patient Name: Anthony Gallegos  : 1945  MRN: [id number]  Today's Date: 2024               Admit Date: 2024    Visit Dx:     ICD-10-CM ICD-9-CM   1. Acute renal failure, unspecified acute renal failure type  N17.9 584.9   2. Chronic anemia  D64.9 285.9   3. Hyponatremia  E87.1 276.1   4. Urinary tract infection associated with indwelling urethral catheter, initial encounter  T83.511A 996.64    N39.0 599.0   5. Acute urinary retention  R33.8 788.29     Patient Active Problem List   Diagnosis    Ankylosing spondylitis of cervical region    Recent unexplained weight loss    Abnormal serum lipase level    Abnormal serum level of amylase    Hypertension    Boil    Immobility    Fall from bed    Tetrahydrocannabinol (THC) use disorder, mild, abuse    Generalized pain        Grief    DISH (disseminated idiopathic skeletal hyperostosis)    Generalized weakness    Severe malnutrition    Altered mental status    Transient alteration of awareness    GERD without esophagitis    BPH (benign prostatic hyperplasia)    UTI (urinary tract infection)    Hyperglycemia    Decreased oral intake    Dysphagia    Failure to thrive in adult    Immunosuppression due to drug therapy    Renal failure    UTI (urinary tract infection), bacterial    ARF (acute renal failure)     Past Medical History:   Diagnosis Date    Abscess of scrotum     MARTITA (acute kidney injury)     Altered mental state     Arthritis     AS (ankylosing spondylitis)     History of MRSA infection     Hypertension     Leukocytosis     Tetrahydrocannabinol (THC) use disorder, mild, abuse 2023    Uveitis      Past Surgical History:   Procedure Laterality Date    COLONOSCOPY      ENDOSCOPY W/ PEG TUBE PLACEMENT N/A 3/29/2024    Procedure: ESOPHAGOGASTRODUODENOSCOPY WITH PERCUTANEOUS ENDOSCOPIC GASTROSTOMY TUBE INSERTION;  Surgeon: Khadar Broderick MD;  Location:   [facility];  Service: General;  Laterality: N/A;  PRE/POST - DYSPHAGIA    ROTATOR CUFF REPAIR Right     TOTAL HIP ARTHROPLASTY Left 2014       SLP Recommendation and Plan                                                                                      SWALLOW EVALUATION (Last 72 Hours)       SLP Adult Swallow Evaluation       Row Name 06/26/24 1120                   Rehab Evaluation    Document Type therapy note (daily note)  -BB        Subjective Information no complaints  -BB        Patient Observations alert;cooperative;agree to therapy  -BB        Patient Effort good  -BB        Symptoms Noted During/After Treatment none  -BB           General Information    Current Method of Nutrition honey-thick liquids;pureed;gastrostomy feedings  -BB        Precautions/Limitations, Vision WFL;for purposes of eval  -BB        Precautions/Limitations, Hearing WFL;for purposes of eval  -BB        Prior Level of Function-Communication motor speech impairment;cognitive-linguistic impairment  -BB        Plans/Goals Discussed with patient;agreed upon  -BB           Pain    Additional Documentation Pain Scale: Numbers Pre/Post-Treatment (Group)  -BB           Pain Scale: Numbers Pre/Post-Treatment    Pretreatment Pain Rating 0/10 - no pain  -BB        Posttreatment Pain Rating 0/10 - no pain  -BB                  User Key  (r) = Recorded By, (t) = Taken By, (c) = Cosigned By      Initials Name Effective Dates    BB Fernando Ahumada, BRANDON 02/19/23 -                     EDUCATION  The patient has been educated in the following areas:   Dysphagia (Swallowing Impairment).        SLP GOALS       Row Name 06/26/24 1416             (LTG) Patient will demonstrate functional swallow for    Diet Texture (Demonstrate functional swallow) pureed textures  -BB      Liquid viscosity (Demonstrate functional swallow) nectar/ mildly thick liquids  -BB      [place] (Demonstrate functional swallow) independently (over 90% accuracy)  -BB       Time Frame (Demonstrate functional swallow) by discharge  -BB      Progress/Outcomes (Demonstrate functional swallow) progress slower than expected  -BB         (STG) Patient will tolerate trials of    Consistencies Trialed (Tolerate trials) pureed textures;thin liquids;nectar/ mildly thick liquids;honey/ moderately thick liquids  ice chips  -BB      Desired Outcome (Tolerate trials) without signs/symptoms of aspiration  -BB      [place] (Tolerate trials) independently (over 90% accuracy)  -BB      Time Frame (Tolerate trials) by discharge  -BB      Progress/Outcomes (Tolerate trials) progress slower than expected  -BB      Comment (Tolerate trials) Dysphagia tx visit completed. RN clearance obtained prior to visit. Pt edentulous. Pt with G-tube with modified oral diet. Pt awake upon SLP entry. Pt's vocal loudness is markedly reduced which contributes to poor speech comprehensibility (approx </=10%). Vocal quality is breathy. Cough appears suboptimal. Pt oriented x3. Able to follow basic commands. RN and pt deny observable difficulties with current diet textures. Pt agreeable to PO trials. No overt clinical signs of aspiration across trials of ice chips, honey thick liquids, and/or pureed textures. Inconsistent, delayed cough or throat clear with trials of nectar thick liquids and thin liquids. Occasional, immediate expiratory reflex or throat clear with trials of nectar thick liquids and thin liquids. Suggest continue current diet textures. Physiologic and pulmonary reserve appear to negatively impact speech and swallow.  -BB                User Key  (r) = Recorded By, (t) = Taken By, (c) = Cosigned By      Initials Name Provider Type    Fernando Blandon, SLP Speech and Language Pathologist                         Time Calculation:    Time Calculation- SLP       Row Name 06/26/24 1422             Time Calculation- SLP    SLP Start Time 1120  -BB      SLP Stop Time 1220  -BB      SLP Time Calculation (min) 60  min  -BB      SLP Received On 06/26/24  -BB         Untimed Charges    [id number] Treatment Swallow Minutes 60  -BB         Total Minutes    Untimed Charges Total Minutes 60  -BB       Total Minutes 60  -BB                User Key  (r) = Recorded By, (t) = Taken By, (c) = Cosigned By      Initials Name Provider Type    Fernando Blandon SLP Speech and Language Pathologist                    Therapy Charges for Today       Code Description Service Date Service Provider Modifiers Qty    [id number]  ST TREATMENT SWALLOW 4 6/26/2024 Fernando Ahumada, SLP GN 1                 BRANDON Last  6/26/2024

## 2024-06-26 NOTE — CASE MANAGEMENT/SOCIAL WORK
Continued Stay Note  [facility]     Patient Name: Anthony Gallegos  MRN: [id number]  Today's Date: 6/26/2024    Admit Date: 6/17/2024    Plan: Return to LTC at [facility]   Discharge Plan       Row Name 06/26/24 1344       Plan    Plan Return to LTC at [facility]    Plan Comments CCP received message from [facility]; patient's pre-cert was denied and requesting peer to peer. Peer to Peer was upheld. CCP spoke with [facility]; patient can return LTC. will need transport. Stella DRUMMOND                   Discharge Codes    No documentation.                 Expected Discharge Date and Time       Expected Discharge Date Expected Discharge Time    Jun 28, 2024               HODA Jimenez

## 2024-06-26 NOTE — PROGRESS NOTES
[facility] Progress Note      Patient Name: Anthony Gallegos  : 1945  MRN: [id number]  Primary Care Physician:  Keshav Eason MD  Date of admission: 2024    Subjective     Interval History:   Acute kidney injury on CKD 2.  1.6 L and 1.1 L out.  Blood pressure mostly in the low 100s systolic.  Occasional mid 90s.  Nursing assistants report he ate better with family feeding him yesterday.  Has not eaten much at all today.  Review of Systems:   As noted above    Objective     Vitals:   Temp:  [97.2 °F (36.2 °C)-97.5 °F (36.4 °C)] 97.5 °F (36.4 °C)  Heart Rate:  [86-91] 86  Resp:  [18-20] 18  BP: ()/(46-68) 112/62    Intake/Output Summary (Last 24 hours) at 2024 1401  Last data filed at 2024 1234  Gross per 24 hour   Intake 725 ml   Output 1325 ml   Net -600 ml       Physical Exam:    General Appearance: Cachectic.  Awake but mumbling.  Skin: warm and dry  HEENT: oral mucosa dry., nonicteric sclera.  Edentulous.  Neck: supple, no JVD  Lungs: Clear to auscultation bilaterally.  Heart: RRR, normal S1 and S2  Abdomen: soft, nontender, nondistended. +bs. PEG  : Mariscal catheter  Extremities: No edema.  No muscle mass.      Scheduled Meds:     castor oil-balsam peru, 1 Application, Topical, Q12H  enoxaparin, 40 mg, Subcutaneous, Q24H  lansoprazole, 30 mg, Per G Tube, Q AM  mupirocin, 1 Application, Topical, Q12H  sodium chloride, 10 mL, Intravenous, Q12H  terazosin, 1 mg, Per G Tube, Nightly      IV Meds:            Results Reviewed:   I have personally reviewed the results from the time of this admission to 2024 14:01 EDT     Results from last 7 days   Lab Units 24  0643 24  0647 24  0936   SODIUM mmol/L 141 141 137   POTASSIUM mmol/L 3.4* 3.6 3.4*   CHLORIDE mmol/L 115* 111* 110*   CO2 mmol/L 21.6* 21.6* 20.3*   BUN mg/dL 16 17 18   CREATININE mg/dL 0.55* 0.58* 0.66*   CALCIUM mg/dL 8.4* 7.9* 7.9*   GLUCOSE mg/dL 107* 83 105*       Estimated  Creatinine Clearance: 107.6 mL/min (A) (by C-G formula based on SCr of 0.55 mg/dL (L)).    Results from last 7 days   Lab Units 06/26/24  0643 06/25/24  0647 06/24/24  0936 06/23/24  0922   MAGNESIUM mg/dL 1.6  --  1.7 1.8   PHOSPHORUS mg/dL 2.5 2.6 2.5 2.4*       Results from last 7 days   Lab Units 06/24/24  0936 06/23/24  0922 06/22/24  0548 06/21/24  0434 06/20/24  0601   URIC ACID mg/dL 6.5 7.3* 8.5* 8.7* 9.7*       Results from last 7 days   Lab Units 06/26/24  0643 06/25/24  0647 06/24/24  0936 06/23/24  0922 06/22/24  0548   WBC 10*3/mm3 6.32 6.32 7.85 6.57 7.22   HEMOGLOBIN g/dL 8.2* 8.4* 8.4* 9.2* 8.9*   PLATELETS 10*3/mm3 330 333 314 284 317             Assessment / Plan     ASSESSMENT:  Acute kidney injury on CKD 2.  Very poor muscle mass.  Likely due to obstruction.  Bilateral hydronephrosis and urinary retention.  Now with Mariscal catheter and on terazosin.  (Terazosin held at times for hypotension.)  Significant improvement with Mariscal catheter placement.  Creatinine normal at 0.55.  Sodium normal on current free water.    2.  ESBL urinary tract infection, ESBL bacteremia.  On ertapenem.  3.  Failure to thrive.  4.  Dysphagia on modified diet.  Has PEG tube on at bedtime tube feeds.  Albumin 2.2  Prealbumin 5.1.  5.  Anemia  PLAN:  Replace potassium per protocol  Continue free water per tube feeds.  Thank you for involving us in the care of Anthony Gallegos.  Please feel free to call with any questions.    Tosha Cummings MD  06/26/24  14:01 EDT    [facility]""  [phone]    Please note that portions of this note were completed with a voice recognition program.  Copied portions of the note reviewed and accurate as of 6/26/2024.

## 2024-06-26 NOTE — PROGRESS NOTES
[facility] Medicine Services  PROGRESS NOTE    Patient Name: Anthony Gallegos  : 1945  MRN: [id number]    Date of Admission: 2024  Primary Care Physician: Keshav Eason MD    Subjective   Subjective     CC:  Follow-up multiple issues and ESBL infection    Subjective:   Patient remains a poor historian.  He denies any new complaints.  He is feeling better.       Objective   Objective     Vital Signs:   Temp:  [97.2 °F (36.2 °C)-97.5 °F (36.4 °C)] 97.2 °F (36.2 °C)  Heart Rate:  [86-91] 86  Resp:  [18-20] 18  BP: ()/(46-68) 94/68        Physical Exam:  Constitutional:Awake, alert, chronically ill-appearing but nontoxic  HENT: NCAT, mucous membranes moist, neck supple  Respiratory: No cough or wheezes, normal respirations, nonlabored breathing   Cardiovascular: Pulse rate is normal, palpable radial pulses  Gastrointestinal: PEG tube, soft, nontender, nondistended  Musculoskeletal: Thin frail with muscle wasting noted, no lower extremity edema, BMI 16  Psychiatric: Calm affect, reasonably cooperative  Neurologic: Poor historian, follows commands  Skin: No rashes or jaundice, warm      Results Reviewed:  Results from last 7 days   Lab Units 24  0643 24  0647 24  0936   WBC 10*3/mm3 6.32 6.32 7.85   HEMOGLOBIN g/dL 8.2* 8.4* 8.4*   HEMATOCRIT % 25.7* 26.4* 27.3*   PLATELETS 10*3/mm3 330 333 314     Results from last 7 days   Lab Units 24  0643 24  0647 24  0936   SODIUM mmol/L 141 141 137   POTASSIUM mmol/L 3.4* 3.6 3.4*   CHLORIDE mmol/L 115* 111* 110*   CO2 mmol/L 21.6* 21.6* 20.3*   BUN mg/dL 16 17 18   CREATININE mg/dL 0.55* 0.58* 0.66*   GLUCOSE mg/dL 107* 83 105*   CALCIUM mg/dL 8.4* 7.9* 7.9*     Estimated Creatinine Clearance: 107.6 mL/min (A) (by C-G formula based on SCr of 0.55 mg/dL (L)).    Microbiology Results Abnormal       Procedure Component Value - Date/Time    Blood Culture - Blood, Arm, Right [[id number]]  (Normal) Collected: 24 4592     Lab Status: Final result Specimen: Blood from Arm, Right Updated: 06/24/24 1515     Blood Culture No growth at 5 days    Blood Culture - Blood, Arm, Left [[id number]]  (Normal) Collected: 06/19/24 1455    Lab Status: Final result Specimen: Blood from Arm, Left Updated: 06/24/24 1500     Blood Culture No growth at 5 days    Narrative:      Less than seven (7) mL's of blood was collected.  Insufficient quantity may yield false negative results.    NEHEMIAH AURIS SCREEN - Swab, Axilla Right, Axilla Left and Groin [[id number]]  (Normal) Collected: 06/18/24 1018    Lab Status: Final result Specimen: Swab from Axilla Right, Axilla Left and Groin Updated: 06/23/24 1045     Nehemiah Auris Screen Culture No Candida auris isolated at 5 days    Blood Culture - Blood, Arm, Right [[id number]]  (Normal) Collected: 06/18/24 0200    Lab Status: Final result Specimen: Blood from Arm, Right Updated: 06/23/24 0215     Blood Culture No growth at 5 days    Narrative:      Less than seven (7) mL's of blood was collected.  Insufficient quantity may yield false negative results.            Imaging Results (Last 24 Hours)       ** No results found for the last 24 hours. **                I have reviewed the medications:  Scheduled Meds:castor oil-balsam peru, 1 Application, Topical, Q12H  enoxaparin, 40 mg, Subcutaneous, Q24H  ertapenem, 1,000 mg, Intravenous, Q24H  lansoprazole, 30 mg, Per G Tube, Q AM  mupirocin, 1 Application, Topical, Q12H  sodium chloride, 10 mL, Intravenous, Q12H  terazosin, 1 mg, Per G Tube, Nightly      Continuous Infusions:     PRN Meds:.  acetaminophen **OR** acetaminophen **OR** acetaminophen    senna-docusate sodium **AND** polyethylene glycol **AND** bisacodyl **AND** bisacodyl    calcium carbonate    HYDROcodone-acetaminophen    ondansetron ODT **OR** ondansetron    Potassium Replacement - Follow Nurse / BPA Driven Protocol    sodium chloride    sodium chloride    Assessment & Plan   Assessment & Plan     Active  Hospital Problems    Diagnosis  POA    **UTI (urinary tract infection), bacterial [N39.0, A49.9]  Yes    ARF (acute renal failure) [N17.9]  Yes    Failure to thrive in adult [R62.7]  Yes    Dysphagia [R13.10]  Yes    Generalized weakness [R53.1]  Yes    DISH (disseminated idiopathic skeletal hyperostosis) [M48.10]  Yes    Immobility [Z74.09]  Yes    Generalized pain [R52]  Yes    Hypertension [I10]  Yes      Resolved Hospital Problems   No resolved problems to display.        Brief Hospital Course to date:  Anthony Gallegos is a 79 y.o. male presents the hospital with ESBL urinary tract infection and bacteremia.    Discussion/plan for today:  Plan discussed with infectious disease.  Per the recommendations likely has completed adequate coverage with ertapenem after dose today plan to monitor.  Plan discussed with case management regarding placement/discharge status.  Sodium seems to be holding at 141.  Potassium replacement today.  Oral intake is poor.  Patient seems to get most of his nutrition through the tube feeding.  He does not seem to show much interest in discussing goals of care today though.  I will continue to try to broach the subject of his prognosis and long-term plans.  For this point he would like to continue current treatment plan with current feeds that are cyclical.   Patient reportedly on methotrexate outpatient so is immunosuppressed unclear how much this needs to affect his recommended length of treatment.  Replete potassium today.  Renal function is at baseline.  Variability and anemia likely from dilution, stable today.  No bleeding noted.  Bacterial susceptibilities reviewed.  Continue Mariscal care.  Treatment plan otherwise as outlined below by my physician.    ESBL E coli UTI and bacteremia-on ertapenem. Anticipate a 7-14 day course of IV therapy. Repeat blood cultures are negative at 4 days  MARTITA-likely predominantly due to obstructive uropathy. Resolved.  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 This has been replaced. Urology recommends keeping it until outpatient follow up. Renal ultrasound shows resolution of hydronephrosis. Continue terazosin   Hypernatremia-resolved with d5w per nephrology. Improved   Metabolic encephalopathy-likely related to some combination of the above, improving  Anemia of chronic disease-hgb 9.2  Chronic dysphagia-on a modified diet and nocturnal tube feeds  Failure to thrive/severe malnutrition-bmp 16.64. Prealbumin 5.1.  GERD-ppi   Ankylosing spondylitis-on methotrexate as an outpatient  Lovenox 40 mg SC daily for DVT prophylaxis.  Full code.  Discussed with patient and nursing staff.  Anticipate discharge  return to [facility]  in 1-3 days.      CODE STATUS:   Code Status and Medical Interventions:   Ordered at: 06/18/24 0231     Code Status (Patient has no pulse and is not breathing):    CPR (Attempt to Resuscitate)     Medical Interventions (Patient has pulse or is breathing):    Full Support       Shola Betancourt MD  06/26/24

## 2024-06-27 VITALS
DIASTOLIC BLOOD PRESSURE: 62 MMHG | SYSTOLIC BLOOD PRESSURE: 115 MMHG | WEIGHT: 156.53 LBS | HEART RATE: 87 BPM | RESPIRATION RATE: 16 BRPM | TEMPERATURE: 97.3 F | HEIGHT: 75 IN | OXYGEN SATURATION: 99 % | BODY MASS INDEX: 19.46 KG/M2

## 2024-06-27 PROBLEM — N17.9 ARF (ACUTE RENAL FAILURE): Status: RESOLVED | Noted: 2024-06-18 | Resolved: 2024-06-27

## 2024-06-27 PROBLEM — E87.1 HYPONATREMIA: Status: ACTIVE | Noted: 2024-06-27

## 2024-06-27 PROBLEM — T83.511A URINARY TRACT INFECTION ASSOCIATED WITH INDWELLING URETHRAL CATHETER: Status: ACTIVE | Noted: 2024-01-22

## 2024-06-27 PROBLEM — D64.9 CHRONIC ANEMIA: Status: ACTIVE | Noted: 2024-06-27

## 2024-06-27 LAB
ALBUMIN SERPL-MCNC: 2.1 G/DL (ref 3.5–5.2)
ANION GAP SERPL CALCULATED.3IONS-SCNC: 6.9 MMOL/L (ref 5–15)
BUN SERPL-MCNC: 17 MG/DL (ref 8–23)
BUN/CREAT SERPL: 28.3 (ref 7–25)
CALCIUM SPEC-SCNC: 8.4 MG/DL (ref 8.6–10.5)
CHLORIDE SERPL-SCNC: 112 MMOL/L (ref 98–107)
CO2 SERPL-SCNC: 23.1 MMOL/L (ref 22–29)
CREAT SERPL-MCNC: 0.6 MG/DL (ref 0.76–1.27)
DEPRECATED RDW RBC AUTO: 46.3 FL (ref 37–54)
EGFRCR SERPLBLD CKD-EPI 2021: 97.6 ML/MIN/1.73
ERYTHROCYTE [DISTWIDTH] IN BLOOD BY AUTOMATED COUNT: 14.5 % (ref 12.3–15.4)
GLUCOSE SERPL-MCNC: 93 MG/DL (ref 65–99)
HCT VFR BLD AUTO: 25.8 % (ref 37.5–51)
HGB BLD-MCNC: 8 G/DL (ref 13–17.7)
MAGNESIUM SERPL-MCNC: 1.6 MG/DL (ref 1.6–2.4)
MCH RBC QN AUTO: 26.9 PG (ref 26.6–33)
MCHC RBC AUTO-ENTMCNC: 31 G/DL (ref 31.5–35.7)
MCV RBC AUTO: 86.9 FL (ref 79–97)
PHOSPHATE SERPL-MCNC: 2.7 MG/DL (ref 2.5–4.5)
PLATELET # BLD AUTO: 361 10*3/MM3 (ref 140–450)
PMV BLD AUTO: 9.8 FL (ref 6–12)
POTASSIUM SERPL-SCNC: 3.5 MMOL/L (ref 3.5–5.2)
POTASSIUM SERPL-SCNC: 4.2 MMOL/L (ref 3.5–5.2)
RBC # BLD AUTO: 2.97 10*6/MM3 (ref 4.14–5.8)
SODIUM SERPL-SCNC: 142 MMOL/L (ref 136–145)
WBC NRBC COR # BLD AUTO: 6.56 10*3/MM3 (ref 3.4–10.8)

## 2024-06-27 PROCEDURE — 80069 RENAL FUNCTION PANEL: CPT | Performed by: INTERNAL MEDICINE

## 2024-06-27 PROCEDURE — 83735 ASSAY OF MAGNESIUM: CPT | Performed by: INTERNAL MEDICINE

## 2024-06-27 PROCEDURE — 85027 COMPLETE CBC AUTOMATED: CPT | Performed by: INTERNAL MEDICINE

## 2024-06-27 PROCEDURE — 84132 ASSAY OF SERUM POTASSIUM: CPT | Performed by: INTERNAL MEDICINE

## 2024-06-27 PROCEDURE — 25010000002 ENOXAPARIN PER 10 MG: Performed by: STUDENT IN AN ORGANIZED HEALTH CARE EDUCATION/TRAINING PROGRAM

## 2024-06-27 RX ORDER — CASTOR OIL AND BALSAM, PERU 788; 87 MG/G; MG/G
1 OINTMENT TOPICAL EVERY 12 HOURS SCHEDULED
Start: 2024-06-27

## 2024-06-27 RX ORDER — BISACODYL 5 MG/1
5 TABLET, DELAYED RELEASE ORAL DAILY PRN
Start: 2024-06-27

## 2024-06-27 RX ORDER — CALCIUM CARBONATE 500 MG/1
2 TABLET, CHEWABLE ORAL 2 TIMES DAILY PRN
Start: 2024-06-27

## 2024-06-27 RX ORDER — POTASSIUM CHLORIDE 1.5 G/1.58G
40 POWDER, FOR SOLUTION ORAL EVERY 4 HOURS
Status: COMPLETED | OUTPATIENT
Start: 2024-06-27 | End: 2024-06-27

## 2024-06-27 RX ORDER — BISACODYL 10 MG
10 SUPPOSITORY, RECTAL RECTAL DAILY PRN
Start: 2024-06-27

## 2024-06-27 RX ORDER — POLYETHYLENE GLYCOL 3350 17 G/17G
17 POWDER, FOR SOLUTION ORAL DAILY PRN
Start: 2024-06-27

## 2024-06-27 RX ORDER — AMOXICILLIN 250 MG
2 CAPSULE ORAL 2 TIMES DAILY PRN
Start: 2024-06-27

## 2024-06-27 RX ADMIN — SENNOSIDES AND DOCUSATE SODIUM 2 TABLET: 50; 8.6 TABLET ORAL at 09:06

## 2024-06-27 RX ADMIN — CASTOR OIL AND BALSAM, PERU 1 APPLICATION: 788; 87 OINTMENT TOPICAL at 20:27

## 2024-06-27 RX ADMIN — CASTOR OIL AND BALSAM, PERU 1 APPLICATION: 788; 87 OINTMENT TOPICAL at 09:06

## 2024-06-27 RX ADMIN — LANSOPRAZOLE 30 MG: 15 TABLET, ORALLY DISINTEGRATING ORAL at 05:52

## 2024-06-27 RX ADMIN — MUPIROCIN 1 APPLICATION: 20 OINTMENT TOPICAL at 20:28

## 2024-06-27 RX ADMIN — BISACODYL 10 MG: 10 SUPPOSITORY RECTAL at 12:55

## 2024-06-27 RX ADMIN — POTASSIUM CHLORIDE 40 MEQ: 1.5 POWDER, FOR SOLUTION ORAL at 09:06

## 2024-06-27 RX ADMIN — ENOXAPARIN SODIUM 40 MG: 100 INJECTION SUBCUTANEOUS at 20:27

## 2024-06-27 RX ADMIN — POTASSIUM CHLORIDE 40 MEQ: 1.5 POWDER, FOR SOLUTION ORAL at 12:55

## 2024-06-27 RX ADMIN — Medication 10 ML: at 09:06

## 2024-06-27 RX ADMIN — POLYETHYLENE GLYCOL 3350 17 G: 17 POWDER, FOR SOLUTION ORAL at 09:06

## 2024-06-27 RX ADMIN — TERAZOSIN 1 MG: 1 CAPSULE ORAL at 20:27

## 2024-06-27 NOTE — PLAN OF CARE
Goal Outcome Evaluation:  Plan of Care Reviewed With: patient        Progress: no change  Outcome Evaluation: vitals stable.  pt denied pain and did not appear to be in any pain.  meds given per orders.  PEG tube and forde catheter in place.  tube feed infusing per orders.  wound care completed per orders.  isolation precautions maintained.  will continue to monitor.

## 2024-06-27 NOTE — PROGRESS NOTES
Labs reviewed.  Sodium normal on current free water and renal function back to baseline. No objection to dc.  Please call with questions .

## 2024-06-27 NOTE — PROGRESS NOTES
Continued Stay Note  [facility]     Patient Name: Anthony Gallegos  MRN: [id number]  Today's Date: 6/27/2024    Admit Date: 6/17/2024    Plan: Return to [facility] via [facility]   Discharge Plan       Row Name 06/27/24 1419       Plan    Plan Return to [facility] via [facility]    Patient/Family in Agreement with Plan yes    Plan Comments Spoke with daughter Whit by telephone and she is aware of plan to return to [facility] today if has a BM.  [facility] scheduled for 8:00 p.m. Per [facility] -  bed available.   Packet to [facility].  Joann MOORE                 Expected Discharge Date and Time       Expected Discharge Date Expected Discharge Time    Jun 27, 2024               Becky S. [place], RN

## 2024-06-27 NOTE — DISCHARGE SUMMARY
[facility] Medicine Services  DISCHARGE SUMMARY    Patient Name: Anthony Gallegos  : 1945  MRN: [id number]    Date of Admission: 2024 11:22 PM  Date of Discharge: 2024  Primary Care Physician: Keshav Eason MD    Consults       Date and Time Order Name Status Description    2024  3:05 PM Inpatient Infectious Diseases Consult Completed     2024  8:28 AM Inpatient Urology Consult Completed     2024  2:31 AM Inpatient Nephrology Consult Completed     2024  1:20 AM [facility] (on-call MD unless specified) Details              Hospital Course       Active Hospital Problems    Diagnosis  POA    **UTI (urinary tract infection), bacterial [N39.0, A49.9]  Yes    Chronic anemia [D64.9]  Yes    Failure to thrive in adult [R62.7]  Yes    Dysphagia [R13.10]  Yes    Urinary tract infection associated with indwelling urethral catheter [T83.511A, N39.0]  Yes    Generalized weakness [R53.1]  Yes    DISH (disseminated idiopathic skeletal hyperostosis) [M48.10]  Yes    Immobility [Z74.09]  Yes    Generalized pain [R52]  Yes    Hypertension [I10]  Yes      Resolved Hospital Problems    Diagnosis Date Resolved POA    ARF (acute renal failure) [N17.9] 2024 Yes          Hospital Course:  Anthony Gallegos is a 79 y.o. male presents the hospital with ESBL urinary tract infection and bacteremia.  Patient completed a course of ESBL coverage and was seen by infectious disease who felt that he had been covered adequately and has stopped antibiotic.  Patient is having no further infectious symptoms.  Urology evaluated and recommended keeping an Mariscal catheter.  They are arranging for follow-up appointment in their clinic they report to me.  Patient will continue alpha-blocker and Mariscal catheter at time of discharge.  Metabolic encephalopathy appears to be improved.  Patient is thought to be at his long-term baseline.  Patient continues on dysphagia diet and uses nocturnal tube feedings.  He does not  drink enough water so required extra free water.  Please monitor his sodium level intermittently at his long-term care facility and adjust the free water as needed to achieve adequate normal sodium level.  Currently receiving 50 mL free water every 4 hours per nephrology recommendations.  Patient does have significant chronic medical issues and I recommend continued outpatient goals of care discussions on a regular basis with her primary care provider to make sure that this is addressed.  I tried to broach the subject with the patient but he did not seem very interested in discussing goals of care with me during this acute inpatient hospital stay.     ESBL E coli UTI and bacteremia-on ertapenem. Anticipate a 7-14 day course of IV therapy. Repeat blood cultures are negative at 4 days  MARTITA-likely predominantly due to obstructive uropathy. Resolved.  Urinary retention causing bilateral hydronephrosis and MARTITA-he had recently had his long term forde removed prior to discharge. This has been replaced. Urology recommends keeping it until outpatient follow up. Renal ultrasound shows resolution of hydronephrosis. Continue terazosin   Hypernatremia-resolved with d5w per nephrology. Improved   Metabolic encephalopathy-likely related to some combination of the above, improving  Anemia of chronic disease-hgb 9.2  Chronic dysphagia-on a modified diet and nocturnal tube feeds  Failure to thrive/severe malnutrition-bmp 16.64. Prealbumin 5.1.  Recommend may be speaking to provider about around-the-clock bolus tube feedings or other tube feeding options.  GERD-ppi   Ankylosing spondylitis-on methotrexate as an outpatient      Recommend wound care as outlined below by our wound care team or by the long-term care facility provider.      Current Wound Care Orders                   Ordered        Wound Care  Every 12 Hours        Question Answer Comment   Wound Locations Rt Heel and Sacrococcygeal area     Wound Care Instructions Cleanse  site, applt Betadine moist gauze, ABD pad, wrap Kerlix to rt hell     Cleanse Normal Saline     Dressing: Abdominal Pad     Dressing: Gauze Roll         06/18/24 1449        Wound Care  Every 12 Hours        Question Answer Comment   Wound Locations Rt lat Foot, rt Heel and rt Ankle     Wound Care Instructions Cleanse site, apply Venelex ointment thin layer, apply Optifoam dressing     Cleanse Normal Saline     Intervention Venelex - Thin Layer     Securement Silicone Border Dressing         06/18/24 1453        Wound Care  Every Other Day        Question Answer Comment   Wound Locations Spine, discoloration area and rt Scapula     Wound Care Instructions Cleanse site, apply Optifoam dressing     Cleanse Normal Saline     Securement Silicone Border Dressing         06/18/24 1451        Silicone Border Dressing to Bony Prominences  Every Shift       06/18/24 1447        Wound Care  As Needed      Comments: Apply Z Guard   Question:  Wound Care Instructions  Answer:  Apply Moisture Barrier After Any Incontinence    06/18/24 1447         At the time of discharge patient was told to take all medications as prescribed, keep all follow-up appointments, and call their doctor or return to the hospital with any worsening or concerning symptoms.    Please note that this note was made using Dragon voice recognition software          Day of Discharge     Subjective: No new complaints reported.  Patient agrees with plan to go to long-term facility today    Vital Signs:   Temp:  [97.5 °F (36.4 °C)-98.2 °F (36.8 °C)] 98.1 °F (36.7 °C)  Heart Rate:  [89-93] 90  Resp:  [17-18] 18  BP: (106-127)/(48-62) 127/61     Physical Exam:    Constitutional:Awake, alert, chronically ill-appearing but nontoxic  HENT: NCAT, mucous membranes moist, neck supple  Respiratory: No cough or wheezes, normal respirations, nonlabored breathing   Cardiovascular: Pulse rate is normal, palpable radial pulses  Gastrointestinal: PEG tube, soft, nontender,  "nondistended  Musculoskeletal: Thin frail with muscle wasting noted, no lower extremity edema, BMI 16  Psychiatric: Calm affect, reasonably cooperative  Neurologic: Poor historian, follows commands  Skin: No rashes or jaundice, warm       Pertinent  and/or Most Recent Results     Results from last 7 days   Lab Units 06/27/24  0636 06/26/24  1556 06/26/24  0643 06/25/24  0647 06/24/24  0936 06/23/24  0922 06/22/24  0548 06/21/24  0434   WBC 10*3/mm3 6.56  --  6.32 6.32 7.85 6.57 7.22  --    HEMOGLOBIN g/dL 8.0*  --  8.2* 8.4* 8.4* 9.2* 8.9*  --    HEMATOCRIT % 25.8*  --  25.7* 26.4* 27.3* 29.6* 27.9*  --    PLATELETS 10*3/mm3 361  --  330 333 314 284 317  --    SODIUM mmol/L 142  --  141 141 137 144 153* 155*   POTASSIUM mmol/L 3.5 4.1 3.4* 3.6 3.4* 3.7 3.9 3.7   CHLORIDE mmol/L 112*  --  115* 111* 110* 113* 123* 122*   CO2 mmol/L 23.1  --  21.6* 21.6* 20.3* 22.3 23.0 23.7   BUN mg/dL 17  --  16 17 18 21 31* 46*   CREATININE mg/dL 0.60*  --  0.55* 0.58* 0.66* 0.70* 0.65* 0.82   GLUCOSE mg/dL 93  --  107* 83 105* 81 109* 97   CALCIUM mg/dL 8.4*  --  8.4* 7.9* 7.9* 8.2* 10.1 8.6           Invalid input(s): \"PROT\", \"LABALBU\"        Invalid input(s): \"TG\", \"LDLCALC\", \"LDLREALC\"        Brief Urine Lab Results  (Last result in the past 365 days)        Color   Clarity   Blood   Leuk Est   Nitrite   Protein   CREAT   Urine HCG        06/18/24 0034 Dark Yellow   Turbid   Large (3+)   Large (3+)   Negative   100 mg/dL (2+)                       Imaging Results (All)       Procedure Component Value Units Date/Time    US Renal Bilateral [[id number]] Collected: 06/21/24 1631     Updated: 06/21/24 1651    Narrative:      US RENAL BILATERAL-     Clinical: Follow-up hydronephrosis     COMPARISON CT of the abdomen and pelvis 6/18/2024     FINDINGS: The right kidney is 10.8 cm in length and the left kidney is  10.6 cm in length. No hydronephrosis on the right or left. Mariscal  catheter within the urinary bladder, cannot evaluate either " ureteral  jet.     The overall renal cortical echotexture appears greater than anticipated,  suggesting medical renal disease. There is a right renal cyst measuring  5.6 cm in maximum diameter. No renal calculus identified. The remainder  is unremarkable.     This report was finalized on 6/21/2024 4:34 PM by Dr. Vamsi Pettit M.D  on Workstation: [id number]       FL Video Swallow Single Contrast [[id number]] Collected: 06/20/24 1536     Updated: 06/20/24 1540    Narrative:      VIDEO ESOPHAGRAM     HISTORY: Dysphagia.     The patient exhibits moderate oropharyngeal dysphagia with reduced  pharyngeal constriction and reduced airway protection. There was some  penetration and trace aspiration occurring with cup sips of thin  liquids. This did not occur with pureed or honey consistencies. Please  also see the speech therapist report.     14 imaging sequences were obtained and the fluoroscopy time measures 3  minutes and 37 seconds. The dose Kerma measures 12 mGy.     This report was finalized on 6/20/2024 3:37 PM by Dr. Roger Dyson M.D  on Workstation: [id number]       CT Abdomen Pelvis Without Contrast [[id number]] Collected: 06/18/24 0406     Updated: 06/18/24 0406    Narrative:        Patient: JADE MARAVILLA  Time Out: 04:06  Exam(s): CT ABDOMEN + PELVIS Without Contrast     EXAM:    CT Abdomen and Pelvis Without Intravenous Contrast    CLINICAL HISTORY:     Reason for exam: abdominal pain, renal failure.    TECHNIQUE:    Axial computed tomography images of the abdomen and pelvis without   intravenous contrast.  CTDI is 15.69 mGy and DLP is 827.7 mGy-cm.  This   CT exam was performed according to the principle of ALARA (As Low As   Reasonably Achievable) by using one or more of the following dose   reduction techniques: automated exposure control, adjustment of the mA   and or kV according to patient size, and or use of iterative   reconstruction technique.    COMPARISON:    March 25, 2024 scattered fibrotic  changes in the lung bases.    FINDINGS:    Lung bases:  Unremarkable.  No mass.  No consolidation.     ABDOMEN:    Liver:  Unremarkable.    Gallbladder and bile ducts:  Unremarkable.  No calcified stones.  No   ductal dilation.    Pancreas:  Unremarkable.  No ductal dilation.    Spleen:  Unremarkable.  No splenomegaly.    Adrenals:  Unremarkable.  No mass.    Kidneys and ureters:  Mild to moderate bilateral hydronephrosis and   hydroureter.  No ureterolithiasis is seen.  Likely urinary retention.  5.  3 cm simple cyst in the lower pole the right kidney.  No follow-up is   required.    Stomach and bowel:  Gaseous distention of the transverse colon.    Possible mild colonic ileus.  The sigmoid colon is partially compressed   by the enlarged urinary bladder.  No mucosal thickening.     PELVIS:    Appendix:  No findings to suggest acute appendicitis.    Bladder:  There is dilation of the urinary bladder measuring 20 cm   craniocaudad.  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    Reproductive:  Unremarkable as visualized.     ABDOMEN and PELVIS:    Intraperitoneal space:  Unremarkable.  No free air.  No significant   fluid collection.    Bones joints:  There is metallic artifact from a left hip arthroplasty.    No acute fracture or dislocation is seen.  Ankylosis of the spine.  No   acute fracture is seen.    Soft tissues:  Unremarkable.    Vasculature:  Unremarkable.  No abdominal aortic aneurysm.    Lymph nodes:  Unremarkable.  No enlarged lymph nodes.    Tubes, lines and devices:  There is a PEG tube in the stomach.  The   stomach is decompressed.    IMPRESSION:       1.  There is dilation of the urinary bladder measuring 20 cm craniocaudad.    There is irregular wall thickening with a 7 cm bladder diverticulum   near the vertex, similar to previous.  Consider chronic bladder outlet   obstruction and urinary retention.  2.  Mild to moderate bilateral  hydronephrosis and hydroureter.  No   ureterolithiasis is seen.  Likely urinary retention.  3.  Gaseous distention of the transverse colon.  Possible mild colonic   ileus.  The sigmoid colon is partially compressed by the enlarged urinary   bladder.      Impression:          Electronically signed by Haider Kuhn MD on 06-18-24 at 0406    CT Head Without Contrast [[id number]] Collected: 06/18/24 0302     Updated: 06/18/24 0302    Narrative:        Patient: JADE MARAVILLA  Time Out: 03:01  Exam(s): CT HEAD Without Contrast     EXAM:    CT Head Without Intravenous Contrast    CLINICAL HISTORY:     Reason for exam: AMS.    TECHNIQUE:    Axial computed tomography images of the head brain without intravenous   contrast.  CTDI is 55.7 mGy and DLP is 1018.9 mGy-cm.  This CT exam was   performed according to the principle of ALARA (As Low As Reasonably   Achievable) by using one or more of the following dose reduction   techniques: automated exposure control, adjustment of the mA and or kV   according to patient size, and or use of iterative reconstruction   technique.    COMPARISON:    3.29.24    FINDINGS:    Brain:  No hemorrhage, herniation, or mass effect.  Chronic   microvascular ischemic changes.    Ventricles:  No hydrocephalus.  Age related cerebral volume loss.    Bones joints:  Unremarkable.    Soft tissues:  Unremarkable.    Sinuses:  No air fluid levels.    Mastoid air cells:  Clear.    IMPRESSION:       No acute hemorrhage, hydrocephalus, or mass effect.      Impression:          Electronically signed by Manasa Reina MD on 06-18-24 at 0301    XR Chest 1 View [[id number]] Collected: 06/18/24 0231     Updated: 06/18/24 0231    Narrative:        Patient: JADE MARAVILLA  Time Out: 02:31  Exam(s): XR CXR 1 VIEW     EXAM:    XR Chest, 1 View    CLINICAL HISTORY:     Reason for exam: AMS.    TECHNIQUE:    Frontal view of the chest.    COMPARISON:  3 25 2024    FINDINGS:    See  Impression.    IMPRESSION:    Prominent interstitial markings are seen.  Correlate with edema or   infection.  No effusion.  Unchanged heart size.    Impression:          Electronically signed by Manasa Reina MD on 06-18-24 at 0231            Discharge Details        Discharge Medications        New Medications        Instructions Start Date   calcium carbonate 500 MG chewable tablet  Commonly known as: TUMS   2 tablets, Oral, 2 Times Daily PRN      castor oil-balsam peru ointment   1 Application, Topical, Every 12 Hours Scheduled, Apply to foot, heel, and ankle wounds per wound care order.      mupirocin 2 % ointment  Commonly known as: BACTROBAN   1 Application, Topical, Every 12 Hours Scheduled, Apply to Penis Abrasion for 3 more days             Changes to Medications        Instructions Start Date   bisacodyl 5 MG EC tablet  Commonly known as: DULCOLAX  What changed: Another medication with the same name was added. Make sure you understand how and when to take each.   5 mg, Oral, Daily PRN      bisacodyl 10 MG suppository  Commonly known as: DULCOLAX  What changed: You were already taking a medication with the same name, and this prescription was added. Make sure you understand how and when to take each.   10 mg, Rectal, Daily PRN      polyethylene glycol 17 g packet  Commonly known as: MIRALAX  What changed:   how much to take  how to take this  when to take this  reasons to take this  additional instructions   17 g, Oral, Daily PRN      sennosides-docusate 8.6-50 MG per tablet  Commonly known as: PERICOLACE  What changed:   how to take this  when to take this  reasons to take this   2 tablets, Oral, 2 Times Daily PRN             Continue These Medications        Instructions Start Date   acetaminophen 325 MG tablet  Commonly known as: TYLENOL   650 mg, Oral, Every 4 Hours PRN      lansoprazole 30 MG Tablet Delayed Release Dispersible disintegrating tablet  Commonly known as: PREVACID SOLUTAB   30 mg, Per G  Tube, Every Early Morning      melatonin 3 MG tablet   3 mg, Oral, Nightly PRN      Menthol-Zinc Oxide 0.44-20.6 % ointment   1 Application, Topical, Every 12 Hours Scheduled      methotrexate 2.5 MG tablet   2.5 mg, Oral, Weekly, Take 2.5 mg on Wednesday and 2.5 mg on Thursday.  Do not take any medication on Friday through Tuesday.      OMEGA 3 PO   1 capsule, Oral, Daily      potassium chloride 20 mEq/15 mL solution  Commonly known as: KAYCIEL   20 mEq, Oral, Daily      terazosin 1 MG capsule  Commonly known as: HYTRIN   1 mg, Per G Tube, Nightly             Stop These Medications      nitrofurantoin (macrocrystal-monohydrate) 100 MG capsule  Commonly known as: MACROBID              No Known Allergies      Discharge Disposition:  Long Term Care (DC - External)    Diet:  Hospital:  Diet Order   Procedures    Diet: Regular/House; Texture: Pureed (NDD 1); Fluid Consistency: Honey Thick       Activity:  Activity Instructions       Activity as Tolerated      Up WIth Assist                   CODE STATUS:    Code Status and Medical Interventions:   Ordered at: 06/18/24 0231     Code Status (Patient has no pulse and is not breathing):    CPR (Attempt to Resuscitate)     Medical Interventions (Patient has pulse or is breathing):    Full Support           Additional Instructions for the Follow-ups that You Need to Schedule       Discharge Follow-up with PCP   As directed       Currently Documented PCP:    Keshav Eason MD    PCP Phone Number:    [phone]     Follow Up Details: Recommend primary care provider follow-up within 1 week for general hospital follow-up.  Please call to schedule today               Contact information for follow-up providers       Keshav Eason MD .    Specialty: Internal Medicine  Why: Recommend primary care provider follow-up within 1 week for general hospital follow-up.  Please call to schedule today  Contact information:  [address] [place] [address]  [phone]                        Contact information for after-discharge care       Destination       [facility] .    Service: Skilled Nursing  Contact information:  [address] [address]  [phone]                                       Shola Betancourt MD  06/27/24      Time Spent on Discharge:  I spent greater than 40 minutes on this discharge activity which included: face-to-face encounter with the patient, reviewing the data in the system, coordination of the care with the nursing staff as well as consultants, documentation, and entering orders.

## 2024-06-28 NOTE — CASE MANAGEMENT/SOCIAL WORK
Case Management Discharge Note      Final Note: Pt discharged  to [facility] - IC level of care.  VIRI Srinivasan RN    Provided Post Acute Provider List?: N/A  N/A Provider List Comment: Has been at [facility] and plans to return  Provided Post Acute Provider Quality & Resource List?: N/A    Selected Continued Care - Discharged on 6/27/2024 Admission date: 6/17/2024 - Discharge disposition: Long Term Care (DC - External)      Destination Coordination complete.      Service Provider Selected Services Address Phone Fax Patient Preferred    [facility] [address] [address] [phone] --              Durable Medical Equipment    No services have been selected for the patient.                Dialysis/Infusion    No services have been selected for the patient.                Home Medical Care    No services have been selected for the patient.                Therapy    No services have been selected for the patient.                Community Resources    No services have been selected for the patient.                Community & DME    No services have been selected for the patient.                    Selected Continued Care - Prior Encounters Includes continued care and service providers with selected services from prior encounters from 3/19/2024 to 6/27/2024      Discharged on 4/9/2024 Admission date: 3/25/2024 - Discharge disposition: Skilled Nursing Facility (DC - External)      Destination       Service Provider Selected Services Address Phone Fax Patient Preferred    [facility] [address] [address] [phone] --                          Transportation Services  Ambulance: [facility]    Final Discharge Disposition Code: 04 - intermediate care facility

## 2024-06-28 NOTE — PROGRESS NOTES
"Enter Query Response Below      Query Response:     stage 4 pressure injury present on admission to bilateral coccyx and left posterior heal, stage 1 pressure injury present on admission to lumbar spine and right scapula, and DTPI (deep tissue pressure injury) present on admission to right lateral foot, ankle, and heel   Electronically signed by Shola Betancourt MD, 24, 4:48 PM EDT.               If applicable, please update the problem list.     Patient: Anthony Gallegos        : 1945  Account: [id number]           Admit Date: 2024        How to Respond to this query:       a. Click New Note     b. Answer query within the yellow box.                c. Update the Problem List, if applicable.      If you have any questions about this query contact me at: [email or web address]     Dr. Betancourt,     Risk factors: 79-year-old male, who resides in a nursing facility, admitted () with \"UTI (urinary tract infection), bacterial\" per the H and P.     Clinical indicators: Wound care nurse evaluated patient on () and found multiple pressure injuries:  - Bilateral coccyx, Present on Original Admission: Yes, Pressure Injury Stage 4  - Left posterior heel, Pressure Injury Stage 4  - lumbar spine, Present on Original Admission: Yes, Pressure Injury Stage 1  - Right scapula Pressure Injury, Present on Original Admission: Yes, Pressure Injury Stage 1  - Right lateral foot Pressure Injury, Present on Original Admission: Yes, Pressure Injury Stage DTPI  - Right ankle Pressure Injury, Present on Original Admission: Yes, Pressure Injury Stage DTPI  - Right heel Pressure Injury, Present on Original Admission: Yes, Pressure Injury Stage DTPI    Treatment: \"Betadine moist gauze was ordered to left heel and Sacrococcygeal area Pressure injury unstageable, Venelex ointment to Lumbar Spine area, Rt Scapula, pressure injury stage 1 and  rt Ankle, rt lat Foot and rt heel\" per wound care nurse, Low air loss " mattress, repositioning every two hours, and minimizing any skin contact with urine or stool     Please clarify if the patient was treated/monitored for:    stage 4 pressure injury present on admission to bilateral coccyx and left posterior heal, stage 1 pressure injury present on admission to lumbar spine and right scapula, and DTPI (deep tissue pressure injury) present on admission to right lateral foot, ankle, and heel  Other- specify______  Unable to determine    By submitting this query, we are merely seeking further clarification of documentation to accurately reflect all conditions that you are monitoring, evaluating, treating or that extend the hospitalization or utilize additional resources of care. Please utilize your independent clinical judgment when addressing the question(s) above.     This query and your response, once completed, will be entered into the legal medical record.    Sincerely,  Roddy Og RN  Clinical Documentation Integrity Program

## 2024-06-28 NOTE — PAYOR COMM NOTE
"Anthony Gallegos (79 y.o. Male)        PLEASE SEE ATTACHED DC SUMMARY    REF#[id number]     THANK YOU    ANAND SHRESTHA LPN CCP   Date of Birth   1945    Social Security Number       Address   [address] [place] [address]    Home Phone   [phone]    MRN   [id number]       [facility]    Marital Status                               Admission Date   24    Admission Type   Emergency    Admitting Provider   Conor Balbuena MD    Attending Provider       Department, Room/Bed   [address], S615/1       Discharge Date   2024    Discharge Disposition   Long Term Care (DC - External)    Discharge Destination                                 Attending Provider: (none)   Allergies: No Known Allergies    Isolation: Contact   Infection: MRSA/History Only (23), ESBL E coli (24)   Code Status: Prior    Ht: 190.5 cm (75\")   Wt: 71 kg (156 lb 8.4 oz)    Admission Cmt: None   Principal Problem: UTI (urinary tract infection), bacterial [N39.0,A49.9]                   Active Insurance as of 2024       Primary Coverage       Payor Plan Insurance Group Employer/Plan Group    AETNA MEDICARE REPLACEMENT AETNA MED ADV POS [id number]       Payor Plan Address Payor Plan Phone Number Payor Plan Fax Number Effective Dates    [address] [phone]  2023 - None Entered    [facility] [id number]         Subscriber Name Subscriber Birth Date Member ID       ANTHONY GALLEGOS 1945 [id number]                     Emergency Contacts        (Rel.) Home Phone Work Phone Mobile Phone    NATHALIE DON (Daughter) [phone] -- --    MAGY GALLEGOS [phone] -- --                 Discharge Summary        Shola Betancourt MD at [address] [place] [address] Medicine Services  DISCHARGE SUMMARY    Patient Name: Anthony Gallegos  : 1945  MRN: [id number]    Date of Admission: 2024 11:22 " PM  Date of Discharge: 6/27/2024  Primary Care Physician: Keshav Eason MD    Consults       Date and Time Order Name Status Description    6/25/2024  3:05 PM Inpatient Infectious Diseases Consult Completed     6/18/2024  8:28 AM Inpatient Urology Consult Completed     6/18/2024  2:31 AM Inpatient Nephrology Consult Completed     6/18/2024  1:20 AM [facility] (on-call MD unless specified) Details              Hospital Course       Active Hospital Problems    Diagnosis  POA    **UTI (urinary tract infection), bacterial [N39.0, A49.9]  Yes    Chronic anemia [D64.9]  Yes    Failure to thrive in adult [R62.7]  Yes    Dysphagia [R13.10]  Yes    Urinary tract infection associated with indwelling urethral catheter [T83.511A, N39.0]  Yes    Generalized weakness [R53.1]  Yes    DISH (disseminated idiopathic skeletal hyperostosis) [M48.10]  Yes    Immobility [Z74.09]  Yes    Generalized pain [R52]  Yes    Hypertension [I10]  Yes      Resolved Hospital Problems    Diagnosis Date Resolved POA    ARF (acute renal failure) [N17.9] 06/27/2024 Yes          Hospital Course:  Anthony Gallegos is a 79 y.o. male presents the hospital with ESBL urinary tract infection and bacteremia.  Patient completed a course of ESBL coverage and was seen by infectious disease who felt that he had been covered adequately and has stopped antibiotic.  Patient is having no further infectious symptoms.  Urology evaluated and recommended keeping an Mariscal catheter.  They are arranging for follow-up appointment in their clinic they report to me.  Patient will continue alpha-blocker and Mariscal catheter at time of discharge.  Metabolic encephalopathy appears to be improved.  Patient is thought to be at his long-term baseline.  Patient continues on dysphagia diet and uses nocturnal tube feedings.  He does not drink enough water so required extra free water.  Please monitor his sodium level intermittently at his long-term care facility and adjust the free water as  needed to achieve adequate normal sodium level.  Currently receiving 50 mL free water every 4 hours per nephrology recommendations.  Patient does have significant chronic medical issues and I recommend continued outpatient goals of care discussions on a regular basis with her primary care provider to make sure that this is addressed.  I tried to broach the subject with the patient but he did not seem very interested in discussing goals of care with me during this acute inpatient hospital stay.     ESBL E coli UTI and bacteremia-on ertapenem. Anticipate a 7-14 day course of IV therapy. Repeat blood cultures are negative at 4 days  MARTITA-likely predominantly due to obstructive uropathy. Resolved.  Urinary retention causing bilateral hydronephrosis and MARTITA-he had recently had his long term forde removed prior to discharge. This has been replaced. Urology recommends keeping it until outpatient follow up. Renal ultrasound shows resolution of hydronephrosis. Continue terazosin   Hypernatremia-resolved with d5w per nephrology. Improved   Metabolic encephalopathy-likely related to some combination of the above, improving  Anemia of chronic disease-hgb 9.2  Chronic dysphagia-on a modified diet and nocturnal tube feeds  Failure to thrive/severe malnutrition-bmp 16.64. Prealbumin 5.1.  Recommend may be speaking to provider about around-the-clock bolus tube feedings or other tube feeding options.  GERD-ppi   Ankylosing spondylitis-on methotrexate as an outpatient      Recommend wound care as outlined below by our wound care team or by the long-term care facility provider.      Current Wound Care Orders                   Ordered        Wound Care  Every 12 Hours        Question Answer Comment   Wound Locations Rt Heel and Sacrococcygeal area     Wound Care Instructions Cleanse site, applt Betadine moist gauze, ABD pad, wrap Kerlix to rt hell     Cleanse Normal Saline     Dressing: Abdominal Pad     Dressing: Gauze Roll          06/18/24 1449        Wound Care  Every 12 Hours        Question Answer Comment   Wound Locations Rt lat Foot, rt Heel and rt Ankle     Wound Care Instructions Cleanse site, apply Venelex ointment thin layer, apply Optifoam dressing     Cleanse Normal Saline     Intervention Venelex - Thin Layer     Securement Silicone Border Dressing         06/18/24 1453        Wound Care  Every Other Day        Question Answer Comment   Wound Locations Spine, discoloration area and rt Scapula     Wound Care Instructions Cleanse site, apply Optifoam dressing     Cleanse Normal Saline     Securement Silicone Border Dressing         06/18/24 1451        Silicone Border Dressing to Bony Prominences  Every Shift       06/18/24 1447        Wound Care  As Needed      Comments: Apply Z Guard   Question:  Wound Care Instructions  Answer:  Apply Moisture Barrier After Any Incontinence    06/18/24 1447         At the time of discharge patient was told to take all medications as prescribed, keep all follow-up appointments, and call their doctor or return to the hospital with any worsening or concerning symptoms.    Please note that this note was made using Dragon voice recognition software          Day of Discharge     Subjective: No new complaints reported.  Patient agrees with plan to go to long-term facility today    Vital Signs:   Temp:  [97.5 °F (36.4 °C)-98.2 °F (36.8 °C)] 98.1 °F (36.7 °C)  Heart Rate:  [89-93] 90  Resp:  [17-18] 18  BP: (106-127)/(48-62) 127/61     Physical Exam:    Constitutional:Awake, alert, chronically ill-appearing but nontoxic  HENT: NCAT, mucous membranes moist, neck supple  Respiratory: No cough or wheezes, normal respirations, nonlabored breathing   Cardiovascular: Pulse rate is normal, palpable radial pulses  Gastrointestinal: PEG tube, soft, nontender, nondistended  Musculoskeletal: Thin frail with muscle wasting noted, no lower extremity edema, BMI 16  Psychiatric: Calm affect, reasonably  "cooperative  Neurologic: Poor historian, follows commands  Skin: No rashes or jaundice, warm       Pertinent  and/or Most Recent Results     Results from last 7 days   Lab Units 06/27/24  0636 06/26/24  1556 06/26/24  0643 06/25/24  0647 06/24/24  0936 06/23/24  0922 06/22/24  0548 06/21/24  0434   WBC 10*3/mm3 6.56  --  6.32 6.32 7.85 6.57 7.22  --    HEMOGLOBIN g/dL 8.0*  --  8.2* 8.4* 8.4* 9.2* 8.9*  --    HEMATOCRIT % 25.8*  --  25.7* 26.4* 27.3* 29.6* 27.9*  --    PLATELETS 10*3/mm3 361  --  330 333 314 284 317  --    SODIUM mmol/L 142  --  141 141 137 144 153* 155*   POTASSIUM mmol/L 3.5 4.1 3.4* 3.6 3.4* 3.7 3.9 3.7   CHLORIDE mmol/L 112*  --  115* 111* 110* 113* 123* 122*   CO2 mmol/L 23.1  --  21.6* 21.6* 20.3* 22.3 23.0 23.7   BUN mg/dL 17  --  16 17 18 21 31* 46*   CREATININE mg/dL 0.60*  --  0.55* 0.58* 0.66* 0.70* 0.65* 0.82   GLUCOSE mg/dL 93  --  107* 83 105* 81 109* 97   CALCIUM mg/dL 8.4*  --  8.4* 7.9* 7.9* 8.2* 10.1 8.6           Invalid input(s): \"PROT\", \"LABALBU\"        Invalid input(s): \"TG\", \"LDLCALC\", \"LDLREALC\"        Brief Urine Lab Results  (Last result in the past 365 days)        Color   Clarity   Blood   Leuk Est   Nitrite   Protein   CREAT   Urine HCG        06/18/24 0034 Dark Yellow   Turbid   Large (3+)   Large (3+)   Negative   100 mg/dL (2+)                       Imaging Results (All)       Procedure Component Value Units Date/Time    US Renal Bilateral [[id number]] Collected: 06/21/24 1631     Updated: 06/21/24 1651    Narrative:      US RENAL BILATERAL-     Clinical: Follow-up hydronephrosis     COMPARISON CT of the abdomen and pelvis 6/18/2024     FINDINGS: The right kidney is 10.8 cm in length and the left kidney is  10.6 cm in length. No hydronephrosis on the right or left. Mariscal  catheter within the urinary bladder, cannot evaluate either ureteral  jet.     The overall renal cortical echotexture appears greater than anticipated,  suggesting medical renal disease. There is a " right renal cyst measuring  5.6 cm in maximum diameter. No renal calculus identified. The remainder  is unremarkable.     This report was finalized on 6/21/2024 4:34 PM by Dr. Vamsi Pettit M.D  on Workstation: [id number]       FL Video Swallow Single Contrast [[id number]] Collected: 06/20/24 1536     Updated: 06/20/24 1540    Narrative:      VIDEO ESOPHAGRAM     HISTORY: Dysphagia.     The patient exhibits moderate oropharyngeal dysphagia with reduced  pharyngeal constriction and reduced airway protection. There was some  penetration and trace aspiration occurring with cup sips of thin  liquids. This did not occur with pureed or honey consistencies. Please  also see the speech therapist report.     14 imaging sequences were obtained and the fluoroscopy time measures 3  minutes and 37 seconds. The dose Kerma measures 12 mGy.     This report was finalized on 6/20/2024 3:37 PM by Dr. Roger Dyson M.D  on Workstation: [id number]       CT Abdomen Pelvis Without Contrast [[id number]] Collected: 06/18/24 0406     Updated: 06/18/24 0406    Narrative:        Patient: JADE MARAVILLA  Time Out: 04:06  Exam(s): CT ABDOMEN + PELVIS Without Contrast     EXAM:    CT Abdomen and Pelvis Without Intravenous Contrast    CLINICAL HISTORY:     Reason for exam: abdominal pain, renal failure.    TECHNIQUE:    Axial computed tomography images of the abdomen and pelvis without   intravenous contrast.  CTDI is 15.69 mGy and DLP is 827.7 mGy-cm.  This   CT exam was performed according to the principle of ALARA (As Low As   Reasonably Achievable) by using one or more of the following dose   reduction techniques: automated exposure control, adjustment of the mA   and or kV according to patient size, and or use of iterative   reconstruction technique.    COMPARISON:    March 25, 2024 scattered fibrotic changes in the lung bases.    FINDINGS:    Lung bases:  Unremarkable.  No mass.  No consolidation.     ABDOMEN:    Liver:  Unremarkable.     Gallbladder and bile ducts:  Unremarkable.  No calcified stones.  No   ductal dilation.    Pancreas:  Unremarkable.  No ductal dilation.    Spleen:  Unremarkable.  No splenomegaly.    Adrenals:  Unremarkable.  No mass.    Kidneys and ureters:  Mild to moderate bilateral hydronephrosis and   hydroureter.  No ureterolithiasis is seen.  Likely urinary retention.  5.  3 cm simple cyst in the lower pole the right kidney.  No follow-up is   required.    Stomach and bowel:  Gaseous distention of the transverse colon.    Possible mild colonic ileus.  The sigmoid colon is partially compressed   by the enlarged urinary bladder.  No mucosal thickening.     PELVIS:    Appendix:  No findings to suggest acute appendicitis.    Bladder:  There is dilation of the urinary bladder measuring 20 cm   craniocaudad.  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    Reproductive:  Unremarkable as visualized.     ABDOMEN and PELVIS:    Intraperitoneal space:  Unremarkable.  No free air.  No significant   fluid collection.    Bones joints:  There is metallic artifact from a left hip arthroplasty.    No acute fracture or dislocation is seen.  Ankylosis of the spine.  No   acute fracture is seen.    Soft tissues:  Unremarkable.    Vasculature:  Unremarkable.  No abdominal aortic aneurysm.    Lymph nodes:  Unremarkable.  No enlarged lymph nodes.    Tubes, lines and devices:  There is a PEG tube in the stomach.  The   stomach is decompressed.    IMPRESSION:       1.  There is dilation of the urinary bladder measuring 20 cm craniocaudad.    There is irregular wall thickening with a 7 cm bladder diverticulum   near the vertex, similar to previous.  Consider chronic bladder outlet   obstruction and urinary retention.  2.  Mild to moderate bilateral hydronephrosis and hydroureter.  No   ureterolithiasis is seen.  Likely urinary retention.  3.  Gaseous distention of the transverse colon.   Possible mild colonic   ileus.  The sigmoid colon is partially compressed by the enlarged urinary   bladder.      Impression:          Electronically signed by Haider Kuhn MD on 06-18-24 at 0406    CT Head Without Contrast [[id number]] Collected: 06/18/24 0302     Updated: 06/18/24 0302    Narrative:        Patient: JADE MARAVILLA  Time Out: 03:01  Exam(s): CT HEAD Without Contrast     EXAM:    CT Head Without Intravenous Contrast    CLINICAL HISTORY:     Reason for exam: AMS.    TECHNIQUE:    Axial computed tomography images of the head brain without intravenous   contrast.  CTDI is 55.7 mGy and DLP is 1018.9 mGy-cm.  This CT exam was   performed according to the principle of ALARA (As Low As Reasonably   Achievable) by using one or more of the following dose reduction   techniques: automated exposure control, adjustment of the mA and or kV   according to patient size, and or use of iterative reconstruction   technique.    COMPARISON:    3.29.24    FINDINGS:    Brain:  No hemorrhage, herniation, or mass effect.  Chronic   microvascular ischemic changes.    Ventricles:  No hydrocephalus.  Age related cerebral volume loss.    Bones joints:  Unremarkable.    Soft tissues:  Unremarkable.    Sinuses:  No air fluid levels.    Mastoid air cells:  Clear.    IMPRESSION:       No acute hemorrhage, hydrocephalus, or mass effect.      Impression:          Electronically signed by Manasa Reina MD on 06-18-24 at 0301    XR Chest 1 View [[id number]] Collected: 06/18/24 0231     Updated: 06/18/24 0231    Narrative:        Patient: JADE MARAVILLA  Time Out: 02:31  Exam(s): XR CXR 1 VIEW     EXAM:    XR Chest, 1 View    CLINICAL HISTORY:     Reason for exam: AMS.    TECHNIQUE:    Frontal view of the chest.    COMPARISON:  3 25 2024    FINDINGS:    See Impression.    IMPRESSION:    Prominent interstitial markings are seen.  Correlate with edema or   infection.  No effusion.  Unchanged heart size.    Impression:           Electronically signed by Manasa Reina MD on 06-18-24 at 0231            Discharge Details        Discharge Medications        New Medications        Instructions Start Date   calcium carbonate 500 MG chewable tablet  Commonly known as: TUMS   2 tablets, Oral, 2 Times Daily PRN      castor oil-balsam peru ointment   1 Application, Topical, Every 12 Hours Scheduled, Apply to foot, heel, and ankle wounds per wound care order.      mupirocin 2 % ointment  Commonly known as: BACTROBAN   1 Application, Topical, Every 12 Hours Scheduled, Apply to Penis Abrasion for 3 more days             Changes to Medications        Instructions Start Date   bisacodyl 5 MG EC tablet  Commonly known as: DULCOLAX  What changed: Another medication with the same name was added. Make sure you understand how and when to take each.   5 mg, Oral, Daily PRN      bisacodyl 10 MG suppository  Commonly known as: DULCOLAX  What changed: You were already taking a medication with the same name, and this prescription was added. Make sure you understand how and when to take each.   10 mg, Rectal, Daily PRN      polyethylene glycol 17 g packet  Commonly known as: MIRALAX  What changed:   how much to take  how to take this  when to take this  reasons to take this  additional instructions   17 g, Oral, Daily PRN      sennosides-docusate 8.6-50 MG per tablet  Commonly known as: PERICOLACE  What changed:   how to take this  when to take this  reasons to take this   2 tablets, Oral, 2 Times Daily PRN             Continue These Medications        Instructions Start Date   acetaminophen 325 MG tablet  Commonly known as: TYLENOL   650 mg, Oral, Every 4 Hours PRN      lansoprazole 30 MG Tablet Delayed Release Dispersible disintegrating tablet  Commonly known as: PREVACID SOLUTAB   30 mg, Per G Tube, Every Early Morning      melatonin 3 MG tablet   3 mg, Oral, Nightly PRN      Menthol-Zinc Oxide 0.44-20.6 % ointment   1 Application, Topical, Every 12 Hours  Scheduled      methotrexate 2.5 MG tablet   2.5 mg, Oral, Weekly, Take 2.5 mg on Wednesday and 2.5 mg on Thursday.  Do not take any medication on Friday through Tuesday.      OMEGA 3 PO   1 capsule, Oral, Daily      potassium chloride 20 mEq/15 mL solution  Commonly known as: KAYCIEL   20 mEq, Oral, Daily      terazosin 1 MG capsule  Commonly known as: HYTRIN   1 mg, Per G Tube, Nightly             Stop These Medications      nitrofurantoin (macrocrystal-monohydrate) 100 MG capsule  Commonly known as: MACROBID              No Known Allergies      Discharge Disposition:  Long Term Care (DC - External)    Diet:  Hospital:  Diet Order   Procedures    Diet: Regular/House; Texture: Pureed (NDD 1); Fluid Consistency: Honey Thick       Activity:  Activity Instructions       Activity as Tolerated      Up WIth Assist                   CODE STATUS:    Code Status and Medical Interventions:   Ordered at: 06/18/24 0231     Code Status (Patient has no pulse and is not breathing):    CPR (Attempt to Resuscitate)     Medical Interventions (Patient has pulse or is breathing):    Full Support           Additional Instructions for the Follow-ups that You Need to Schedule       Discharge Follow-up with PCP   As directed       Currently Documented PCP:    Keshav Eason MD    PCP Phone Number:    [phone]     Follow Up Details: Recommend primary care provider follow-up within 1 week for general hospital follow-up.  Please call to schedule today               Contact information for follow-up providers       Keshav Eason MD .    Specialty: Internal Medicine  Why: Recommend primary care provider follow-up within 1 week for general hospital follow-up.  Please call to schedule today  Contact information:  [address] [place], [place] [address]  [phone]                       Contact information for after-discharge care       Destination       [facility] .    Service: Skilled Nursing  Contact  information:  [address]  [facility] [address]  [phone]                                       Shola Betancourt MD  06/27/24      Time Spent on Discharge:  I spent greater than 40 minutes on this discharge activity which included: face-to-face encounter with the patient, reviewing the data in the system, coordination of the care with the nursing staff as well as consultants, documentation, and entering orders.        Electronically signed by Shola Betancourt MD at 06/27/24 9382

## 2024-08-07 NOTE — PROGRESS NOTES
"Chief Complaint  Difficulty Swallowing and Anemia    Subjective        Anthony Gallegos is an 79-year-old male new to our practice, Resides in [facility] facility [facility]. PMH is complex, including chronic anemia with a baseline hemoglobin trending around 9, immobility, hypertension, recurrent UTIs, severe malnutrition, just to name a few.  He in wheelchair-bound, accompanied by his son and [facility] CNA staff here for iron deficiency anemia, chronic blood loss, malnutrition, and difficulty swallowing s/p PEG tube placement.    Dysphagia  Patient underwent video swallow test on June 2024 due to dysphagia.  Results suggestive for moderate oral pharyngeal dysphagia with reduced pharyngeal constriction and airway protection while on thin liquids but did not occur with purée or honey consistencies. Known history of PEG tube placement due to failure to thrive and difficulty swallowing, back in 4/9/24 hospital illness course. This Peg tube is still in place. He has very little appettie, still 3 meals a day, but does not finish all plates. He is a poor historian.  Even with CNA and son accompanied in room, their contribution of patient's medical history is limited.     Iron deficiency anemia, chronic blood loss - patient denies blood in stool or hematemesis.  Not currently on anticoagulation therapy.  He takes methotrexate for ankylosing spondylitis. Denies blood in stool. No abdominal pain.    CT abdomen pelvis on 6/18/2024, showed unremarkable gallbladder, liver, pancreas, spleen.  No ductal dilatation. Gaseous distention of the transverse colon, questionable mild colonic ileus.  Sigmoid colon is partially compressed.   Patient denies personal or family history of colorectal or upper GI cancer.    Objective   Vital Signs:  /58   Pulse 90   Temp 97.9 °F (36.6 °C)   Ht 190.5 cm (75\")   Wt 63.6 kg (140 lb 4.8 oz)   SpO2 99%   BMI 17.54 kg/m²   Estimated body mass index is 17.54 kg/m² as calculated " "from the following:    Height as of this encounter: 190.5 cm (75\").    Weight as of this encounter: 63.6 kg (140 lb 4.8 oz).       BMI is within normal parameters. No other follow-up for BMI required.    Physical Exam  Vitals and nursing note reviewed.   Constitutional:       General: He is not in acute distress.     Appearance: He is not ill-appearing, toxic-appearing or diaphoretic.      Comments: Wheelchair-bound, cachetic appearance. Alert and oriented x3, able to contribute to PHM and follows directions well.   Eyes:      General: No scleral icterus.     Conjunctiva/sclera: Conjunctivae normal.   Cardiovascular:      Rate and Rhythm: Normal rate and regular rhythm.      Pulses: Normal pulses.      Heart sounds: Normal heart sounds.   Pulmonary:      Effort: Pulmonary effort is normal. No respiratory distress.      Breath sounds: Normal breath sounds. No stridor.   Abdominal:      General: Abdomen is flat. Bowel sounds are normal. There is no distension.      Palpations: Abdomen is soft. There is no mass.      Tenderness: There is no abdominal tenderness. There is no right CVA tenderness, left CVA tenderness, guarding or rebound.      Hernia: No hernia is present.      Comments: Peg tube is inplace, no s/s of inflammation around the insertion site.    Skin:     Coloration: Skin is pale. Skin is not jaundiced.      Findings: No erythema.   Neurological:      Mental Status: He is alert and oriented to person, place, and time. Mental status is at baseline.   Psychiatric:         Mood and Affect: Mood normal.        Result Review :    The following data was reviewed by: NOHEMI Julian on 08/08/2024:  Lab Results   Component Value Date    GLUCOSE 93 06/27/2024    BUN 17 06/27/2024    CREATININE 0.60 (L) 06/27/2024     06/27/2024    K 4.2 06/27/2024     (H) 06/27/2024    CALCIUM 8.4 (L) 06/27/2024    PROTEINTOT 8.3 06/18/2024    ALBUMIN 2.1 (L) 06/27/2024    ALT 25 06/18/2024    AST 34 06/18/2024    " ALKPHOS 78 06/18/2024    BILITOT 0.6 06/18/2024    GLOB 5.4 06/18/2024    AGRATIO 0.5 06/18/2024    BCR 28.3 (H) 06/27/2024    ANIONGAP 6.9 06/27/2024    EGFR 97.6 06/27/2024     Lab Results   Component Value Date    WBC 15.21 (H) 08/08/2024    HGB 8.5 (L) 08/08/2024    HCT 27.7 (L) 08/08/2024    MCV 86.0 08/08/2024     (H) 08/08/2024     Lab Results   Component Value Date    IRON 25 (L) 06/21/2024    LABIRON 13 (L) 06/21/2024    TRANSFERRIN 125 (L) 06/21/2024    TIBC 186 (L) 06/21/2024    FERRITIN 1,033.00 (H) 06/21/2024      CT Abdomen Pelvis Without Contrast (06/18/2024 02:31)        Assessment and Plan     Diagnoses and all orders for this visit:    1. Severe protein-calorie malnutrition (Primary)  -     Comprehensive Metabolic Panel  -     TSH    2. Iron deficiency anemia, unspecified iron deficiency anemia type  -     Ambulatory Referral to Hematology / Oncology  -     Occult Blood X 3, Stool - Stool, Per Rectum  -     Iron and TIBC; Future  -     Ferritin; Future  -     CBC (No Diff)  -     Comprehensive Metabolic Panel  -     Vitamin B12  -     Folate    3. Dysphagia, unspecified type    Discussion  An 79 year-old male with very complex medical history and high risks for physical decompensation, rehospitalization, was seen and consulted for severe Iron deficiency anemia, chronic blood loss anemia, failure to thrive with severe malnutrition, and dysphagia, plan of care as follows:    Anemia, chronic blood loss and iron def. - multifactorial. Patient is on chronic immunosuppression methotrexate for ankylosing spondylitis.  Please note that this medication can moderately increased patient risks for anemia.  I have spoken in length with his POA Joaquina Gallegos via phone in the presence of patient, his son and CNA for alternative workups approach, in consideration of patient's physical debilitation, and comorbidities.  POA has expressed her preference for conservative approach, in which will check fecal occult  blood test, order labs (iron profile, b12/folate, tsh, cbc, cmp, TSH); refer to hematology consult for possible IV iron infusion due to poor absorption of by mouth iron supplement. Avoid NSAIDs.    Patient will need to follow-up with registered dietitian at the facility and that I recommend boost supplements at every meal or at least once a day if has not done so for malnutrition.    Continue honey thickened liquid as tolerates.    If the above failed, may warrant bidirectional endoscopy.  POA is agreeable to plan of care above.  Further recommendations to follow in 8 weeks.  Follow Up     Return for 8 weeks.    I have reviewed patient's current medications list, relevant clinical information necessary for today's encounter. I discussed the clinical impression and treatment plan with the patient, who verbalized understanding and in agreement.  All questions were answered and support was provided.

## 2024-08-08 ENCOUNTER — OFFICE VISIT (OUTPATIENT)
Dept: GASTROENTEROLOGY | Facility: CLINIC | Age: 79
End: 2024-08-08
Payer: MEDICARE

## 2024-08-08 ENCOUNTER — LAB (OUTPATIENT)
Dept: LAB | Facility: HOSPITAL | Age: 79
End: 2024-08-08
Payer: MEDICARE

## 2024-08-08 VITALS
HEART RATE: 90 BPM | DIASTOLIC BLOOD PRESSURE: 58 MMHG | OXYGEN SATURATION: 99 % | BODY MASS INDEX: 17.44 KG/M2 | TEMPERATURE: 97.9 F | SYSTOLIC BLOOD PRESSURE: 104 MMHG | HEIGHT: 75 IN | WEIGHT: 140.3 LBS

## 2024-08-08 DIAGNOSIS — R13.10 DYSPHAGIA, UNSPECIFIED TYPE: ICD-10-CM

## 2024-08-08 DIAGNOSIS — E43 SEVERE PROTEIN-CALORIE MALNUTRITION: Primary | ICD-10-CM

## 2024-08-08 DIAGNOSIS — D50.9 IRON DEFICIENCY ANEMIA, UNSPECIFIED IRON DEFICIENCY ANEMIA TYPE: ICD-10-CM

## 2024-08-08 LAB
ALBUMIN SERPL-MCNC: 2.3 G/DL (ref 3.5–5.2)
ALBUMIN/GLOB SERPL: 0.4 G/DL
ALP SERPL-CCNC: 101 U/L (ref 39–117)
ALT SERPL W P-5'-P-CCNC: 26 U/L (ref 1–41)
ANION GAP SERPL CALCULATED.3IONS-SCNC: 8 MMOL/L (ref 5–15)
AST SERPL-CCNC: 42 U/L (ref 1–40)
BILIRUB SERPL-MCNC: 0.3 MG/DL (ref 0–1.2)
BUN SERPL-MCNC: 14 MG/DL (ref 8–23)
BUN/CREAT SERPL: 29.8 (ref 7–25)
CALCIUM SPEC-SCNC: 8.6 MG/DL (ref 8.6–10.5)
CHLORIDE SERPL-SCNC: 95 MMOL/L (ref 98–107)
CO2 SERPL-SCNC: 26 MMOL/L (ref 22–29)
CREAT SERPL-MCNC: 0.47 MG/DL (ref 0.76–1.27)
DEPRECATED RDW RBC AUTO: 49.9 FL (ref 37–54)
EGFRCR SERPLBLD CKD-EPI 2021: 105.1 ML/MIN/1.73
ERYTHROCYTE [DISTWIDTH] IN BLOOD BY AUTOMATED COUNT: 15.9 % (ref 12.3–15.4)
FERRITIN SERPL-MCNC: 530.2 NG/ML (ref 30–400)
FOLATE SERPL-MCNC: >20 NG/ML (ref 4.78–24.2)
GLOBULIN UR ELPH-MCNC: 6.2 GM/DL
GLUCOSE SERPL-MCNC: 116 MG/DL (ref 65–99)
HCT VFR BLD AUTO: 27.7 % (ref 37.5–51)
HGB BLD-MCNC: 8.5 G/DL (ref 13–17.7)
IRON 24H UR-MRATE: 14 MCG/DL (ref 59–158)
IRON SATN MFR SERPL: 8 % (ref 20–50)
MCH RBC QN AUTO: 26.4 PG (ref 26.6–33)
MCHC RBC AUTO-ENTMCNC: 30.7 G/DL (ref 31.5–35.7)
MCV RBC AUTO: 86 FL (ref 79–97)
PLATELET # BLD AUTO: 471 10*3/MM3 (ref 140–450)
PMV BLD AUTO: 8.4 FL (ref 6–12)
POTASSIUM SERPL-SCNC: 4.3 MMOL/L (ref 3.5–5.2)
PROT SERPL-MCNC: 8.5 G/DL (ref 6–8.5)
RBC # BLD AUTO: 3.22 10*6/MM3 (ref 4.14–5.8)
SODIUM SERPL-SCNC: 129 MMOL/L (ref 136–145)
TIBC SERPL-MCNC: 180 MCG/DL (ref 298–536)
TRANSFERRIN SERPL-MCNC: 121 MG/DL (ref 200–360)
TSH SERPL DL<=0.05 MIU/L-ACNC: 14.1 UIU/ML (ref 0.27–4.2)
VIT B12 BLD-MCNC: 1037 PG/ML (ref 211–946)
WBC NRBC COR # BLD AUTO: 15.21 10*3/MM3 (ref 3.4–10.8)

## 2024-08-08 PROCEDURE — 82746 ASSAY OF FOLIC ACID SERUM: CPT | Performed by: NURSE PRACTITIONER

## 2024-08-08 PROCEDURE — 84443 ASSAY THYROID STIM HORMONE: CPT | Performed by: NURSE PRACTITIONER

## 2024-08-08 PROCEDURE — 80053 COMPREHEN METABOLIC PANEL: CPT | Performed by: NURSE PRACTITIONER

## 2024-08-08 PROCEDURE — 84466 ASSAY OF TRANSFERRIN: CPT

## 2024-08-08 PROCEDURE — 82607 VITAMIN B-12: CPT | Performed by: NURSE PRACTITIONER

## 2024-08-08 PROCEDURE — 36415 COLL VENOUS BLD VENIPUNCTURE: CPT | Performed by: NURSE PRACTITIONER

## 2024-08-08 PROCEDURE — 85027 COMPLETE CBC AUTOMATED: CPT | Performed by: NURSE PRACTITIONER

## 2024-08-08 PROCEDURE — 83540 ASSAY OF IRON: CPT

## 2024-08-08 PROCEDURE — 82728 ASSAY OF FERRITIN: CPT

## 2024-08-08 RX ORDER — HYDROCODONE BITARTRATE AND ACETAMINOPHEN 5; 325 MG/1; MG/1
TABLET ORAL
COMMUNITY
Start: 2024-08-05

## 2024-08-08 RX ORDER — ATORVASTATIN CALCIUM 40 MG/1
TABLET, FILM COATED ORAL
COMMUNITY
Start: 2024-07-26

## 2024-08-08 RX ORDER — POTASSIUM BICARBONATE 782 MG/1
TABLET, EFFERVESCENT ORAL
COMMUNITY
Start: 2024-07-22

## 2024-08-08 RX ORDER — LACTOSE-REDUCED FOOD 0.04G-1/ML
LIQUID (ML) ORAL
Status: CANCELLED | OUTPATIENT
Start: 2024-08-08

## 2024-08-08 RX ORDER — CYCLOBENZAPRINE HCL 10 MG
TABLET ORAL
COMMUNITY

## 2024-08-08 NOTE — PATIENT INSTRUCTIONS
FBOT, labs ordered today.   Follow up with registered dietitian for malnutrition, recommend boost supplements if has not done so.   Hematology consult for possible iv iron infusion for iron deficiency anemia. Referral sent by this practitioner today.   Avoid NSAIDs  May warrant EGD/Colonoscopy if the above failed. I will follow up with you in 8 weeks or sooner as needed.

## 2024-08-09 ENCOUNTER — TELEPHONE (OUTPATIENT)
Dept: GASTROENTEROLOGY | Facility: CLINIC | Age: 79
End: 2024-08-09
Payer: MEDICARE

## 2024-08-09 ENCOUNTER — TELEPHONE (OUTPATIENT)
Dept: ONCOLOGY | Facility: CLINIC | Age: 79
End: 2024-08-09
Payer: MEDICARE

## 2024-08-09 NOTE — TELEPHONE ENCOUNTER
8/9/24 Telephone discussion with ISABELLAWhit - daughter regarding abnormal lab results.     (1) Significantly low iron profile, hgb 8.5  - hematology referral sent from 8/8/24 visit with patient. Recommending iron supplement if has not started yet at the facility. He may benefit iv iron infusion. Nursing home facility will reach out to hematology requesting for consultation appointment asap.    (2) WBC 15 thousand and elevated Platelet count - suspect underlying metabolic disturbances/infection, recurrent ESBL UTIs, multiple active wounds - defer to PCP at the facility for further workups/treat accordingly.    (3) Na 129, suspect fluid volume def., need to assess hydration status and/or repeat labs, defer to PCP's treatment recommendation at the facility.     (4) TSH, critically high. Will need further workups, and/or start thyroid supplement asap.     (4) Pending FOBT - to be discussed once result available.     (5) I am not convinced that Mr. Cruz is a good candidate for bidirectional endoscopy, given his extensive medical problems and physical debility, wheelchair-bound, high risks for decompensation. ISABELLA Larsonie also has expressed her hesitancy about this minimally invasive procedure under general anesthesia and agreeable to postpone/opt out at this time,  unless urgently needed with signs/symptoms of active bleeding, patient will need to go to the ER.     (6) I've discussed the above concerns and impression with POA, we will also inform his LTC facility. I will continue to follow up with him as scheduled.

## 2024-08-12 ENCOUNTER — LAB (OUTPATIENT)
Dept: LAB | Facility: HOSPITAL | Age: 79
End: 2024-08-12
Payer: MEDICARE

## 2024-08-12 ENCOUNTER — CONSULT (OUTPATIENT)
Dept: ONCOLOGY | Facility: CLINIC | Age: 79
End: 2024-08-12
Payer: MEDICARE

## 2024-08-12 VITALS
HEART RATE: 70 BPM | BODY MASS INDEX: 17.54 KG/M2 | HEIGHT: 75 IN | DIASTOLIC BLOOD PRESSURE: 62 MMHG | OXYGEN SATURATION: 96 % | TEMPERATURE: 97.6 F | RESPIRATION RATE: 16 BRPM | SYSTOLIC BLOOD PRESSURE: 97 MMHG

## 2024-08-12 DIAGNOSIS — D50.9 IRON DEFICIENCY ANEMIA, UNSPECIFIED IRON DEFICIENCY ANEMIA TYPE: Primary | ICD-10-CM

## 2024-08-12 DIAGNOSIS — D64.9 ANEMIA, UNSPECIFIED TYPE: Primary | ICD-10-CM

## 2024-08-12 LAB
BASOPHILS # BLD AUTO: 0.08 10*3/MM3 (ref 0–0.2)
BASOPHILS NFR BLD AUTO: 0.7 % (ref 0–1.5)
DEPRECATED RDW RBC AUTO: 47 FL (ref 37–54)
EOSINOPHIL # BLD AUTO: 0.42 10*3/MM3 (ref 0–0.4)
EOSINOPHIL NFR BLD AUTO: 3.6 % (ref 0.3–6.2)
ERYTHROCYTE [DISTWIDTH] IN BLOOD BY AUTOMATED COUNT: 15.6 % (ref 12.3–15.4)
HCT VFR BLD AUTO: 27.4 % (ref 37.5–51)
HGB BLD-MCNC: 8.9 G/DL (ref 13–17.7)
IMM GRANULOCYTES # BLD AUTO: 0.2 10*3/MM3 (ref 0–0.05)
IMM GRANULOCYTES NFR BLD AUTO: 1.7 % (ref 0–0.5)
LYMPHOCYTES # BLD AUTO: 2.56 10*3/MM3 (ref 0.7–3.1)
LYMPHOCYTES NFR BLD AUTO: 22.1 % (ref 19.6–45.3)
MCH RBC QN AUTO: 27 PG (ref 26.6–33)
MCHC RBC AUTO-ENTMCNC: 32.5 G/DL (ref 31.5–35.7)
MCV RBC AUTO: 83 FL (ref 79–97)
MONOCYTES # BLD AUTO: 0.99 10*3/MM3 (ref 0.1–0.9)
MONOCYTES NFR BLD AUTO: 8.6 % (ref 5–12)
NEUTROPHILS NFR BLD AUTO: 63.3 % (ref 42.7–76)
NEUTROPHILS NFR BLD AUTO: 7.32 10*3/MM3 (ref 1.7–7)
NRBC BLD AUTO-RTO: 0 /100 WBC (ref 0–0.2)
PLATELET # BLD AUTO: 364 10*3/MM3 (ref 140–450)
PMV BLD AUTO: 8.4 FL (ref 6–12)
RBC # BLD AUTO: 3.3 10*6/MM3 (ref 4.14–5.8)
WBC NRBC COR # BLD AUTO: 11.57 10*3/MM3 (ref 3.4–10.8)

## 2024-08-12 PROCEDURE — 3074F SYST BP LT 130 MM HG: CPT | Performed by: INTERNAL MEDICINE

## 2024-08-12 PROCEDURE — 3078F DIAST BP <80 MM HG: CPT | Performed by: INTERNAL MEDICINE

## 2024-08-12 PROCEDURE — 85025 COMPLETE CBC W/AUTO DIFF WBC: CPT

## 2024-08-12 PROCEDURE — G2211 COMPLEX E/M VISIT ADD ON: HCPCS | Performed by: INTERNAL MEDICINE

## 2024-08-12 PROCEDURE — 36415 COLL VENOUS BLD VENIPUNCTURE: CPT

## 2024-08-12 PROCEDURE — 99205 OFFICE O/P NEW HI 60 MIN: CPT | Performed by: INTERNAL MEDICINE

## 2024-08-12 PROCEDURE — 1126F AMNT PAIN NOTED NONE PRSNT: CPT | Performed by: INTERNAL MEDICINE

## 2024-08-12 NOTE — PROGRESS NOTES
[facility]    CONSULTING IN BLOOD DISORDERS & CANCER      REASON FOR CONSULTATION/CHIEF COMPLAINT:     Evaluation and management for anemia                             REQUESTING PHYSICIAN: NOHEMI Julian  RECORDS OBTAINED:  Records of the patients history including those from the electronic medical record were reviewed and summarized in detail.    HISTORY OF PRESENT ILLNESS:    The patient is a 79 y.o. year old male with medical history significant for ankylosing spondylitis, on methotrexate at least since 2015, recent prolonged hospitalization (June 2024) for ESBL UTI and bacteremia comes for anemia evaluation and management.  Patient is currently a resident of a nursing home.  Has indwelling Mariscal's catheter and gets nocturnal tube feedings for history of dysphagia.    On review of chart, patient's baseline hemoglobin appears to be around 10-11 g/dL since early 2024.  Normal MCV around 85.  Normal WBC and slightly elevated platelet count.    Workup performed in early August 2024 noted iron 14, ferritin 530, iron saturation 8% and TIBC 180.  Soluble transferrin receptor normal at 20.7.  This is most consistent with anemia of chronic disease.  Normal folic acid and vitamin B12 levels.  SPEP/SUMAYA did not show any M spike.  Free light chains L evaded, kappa light chain 2023.7, lambda light chain 104.6 and FLC ratio 2.14.  Slightly elevated FLC ratio likely inflammatory/autoimmune condition related.  He may need a 24-hour UPEP/SUMAYA.    A stool for occult blood has been requested.  Not performed yet.    Patient has come to hematology clinic for further evaluation and management.  He is accompanied by his son.  Reports of generalized weakness.  He is in a wheelchair.        Past Medical History:   Diagnosis Date    Abscess of scrotum     MARTITA (acute kidney injury)     Altered mental state     Arthritis     AS (ankylosing spondylitis)     History of MRSA infection     Hypertension     Leukocytosis      Tetrahydrocannabinol (THC) use disorder, mild, abuse 12/20/2023    Uveitis      Past Surgical History:   Procedure Laterality Date    COLONOSCOPY      ENDOSCOPY W/ PEG TUBE PLACEMENT N/A 3/29/2024    Procedure: ESOPHAGOGASTRODUODENOSCOPY WITH PERCUTANEOUS ENDOSCOPIC GASTROSTOMY TUBE INSERTION;  Surgeon: Khadar Broderick MD;  Location: [facility] ENDOSCOPY;  Service: General;  Laterality: N/A;  PRE/POST - DYSPHAGIA    ROTATOR CUFF REPAIR Right     TOTAL HIP ARTHROPLASTY Left 2014       MEDICATIONS  No current facility-administered medications for this visit.  No current outpatient medications on file.    Facility-Administered Medications Ordered in Other Visits:     acetaminophen (TYLENOL) tablet 650 mg, 650 mg, Oral, Q4H PRN **OR** acetaminophen (TYLENOL) 160 MG/5ML oral solution 650 mg, 650 mg, Oral, Q4H PRN **OR** acetaminophen (TYLENOL) suppository 650 mg, 650 mg, Rectal, Q4H PRN, Luther Rojas MD    atorvastatin (LIPITOR) tablet 40 mg, 40 mg, Oral, Daily, Luther Rojas MD, 40 mg at 08/19/24 0917    sennosides-docusate (PERICOLACE) 8.6-50 MG per tablet 2 tablet, 2 tablet, Oral, BID, 2 tablet at 08/19/24 0917 **AND** polyethylene glycol (MIRALAX) packet 17 g, 17 g, Oral, Daily PRN **AND** bisacodyl (DULCOLAX) EC tablet 5 mg, 5 mg, Oral, Daily PRN **AND** bisacodyl (DULCOLAX) suppository 10 mg, 10 mg, Rectal, Daily PRN, Luther Rojas MD    Calcium Replacement - Follow Nurse / BPA Driven Protocol, , Does not apply, PRN, Luther Rojas MD    ciprofloxacin (CILOXAN) 0.3 % ophthalmic solution 2 drop, 2 drop, Right Eye, Q4H, Luther Rojas MD, 2 drop at 08/19/24 0918    cyclobenzaprine (FLEXERIL) tablet 5 mg, 5 mg, Oral, Nightly PRN, Luther Rojas MD    ertapenem (INVanz) 1,000 mg in sodium chloride 0.9 % 100 mL MBP, 1,000 mg, Intravenous, Q24H, Sharif Logan MD    HYDROcodone-acetaminophen (NORCO) 5-325 MG per tablet 1 tablet, 1 tablet, Oral, Q6H PRN, Luther Rojas MD     lansoprazole (PREVACID SOLUTAB) disintegrating tablet Tablet Delayed Release Dispersible 30 mg, 30 mg, Per G Tube, Q AM, Luther Rojas MD, 30 mg at 08/19/24 0618    levothyroxine (SYNTHROID, LEVOTHROID) tablet 25 mcg, 25 mcg, Oral, Q AM, Luther Rojas MD, 25 mcg at 08/19/24 0618    Magnesium Standard Dose Replacement - Follow Nurse / BPA Driven Protocol, , Does not apply, PRN, Luther Rojas MD    melatonin tablet 5 mg, 5 mg, Oral, Nightly PRN, Luther Rojas MD    [START ON 8/21/2024] methotrexate tablet 2.5 mg, 2.5 mg, Oral, Once per day on Wednesday Thursday, Luther Rojas MD    nitroglycerin (NITROSTAT) SL tablet 0.4 mg, 0.4 mg, Sublingual, Q5 Min PRN, Luther Rojas MD    ondansetron ODT (ZOFRAN-ODT) disintegrating tablet 4 mg, 4 mg, Oral, Q6H PRN **OR** ondansetron (ZOFRAN) injection 4 mg, 4 mg, Intravenous, Q6H PRN, Luther Rojas MD    Phosphorus Replacement - Follow Nurse / BPA Driven Protocol, , Does not apply, PRN, Luther Rojas MD    potassium chloride (KLOR-CON) packet 20 mEq, 20 mEq, Oral, Daily, Luther Rojas MD, 20 mEq at 08/19/24 0917    Potassium Replacement - Follow Nurse / BPA Driven Protocol, , Does not apply, PRN, Luther Rojas MD    [COMPLETED] Insert Peripheral IV, , , Once **AND** sodium chloride 0.9 % flush 10 mL, 10 mL, Intravenous, PRN, Luther Rojas MD    sodium chloride 0.9 % flush 10 mL, 10 mL, Intravenous, Q12H, Luther Rojas MD, 10 mL at 08/19/24 0918    sodium chloride 0.9 % flush 10 mL, 10 mL, Intravenous, PRN, Luther Rojas MD    sodium chloride 0.9 % infusion 40 mL, 40 mL, Intravenous, PRN, Luther Rojas MD    terazosin (HYTRIN) capsule 1 mg, 1 mg, Per G Tube, Nightly, Luther Rojas MD    ALLERGIES:   No Known Allergies    SOCIAL HISTORY:       Social History     Socioeconomic History    Marital status:     Number of children: 2   Tobacco Use    Smoking status: Never    Smokeless tobacco: Never   Vaping Use    Vaping  "status: Never Used   Substance and Sexual Activity    Alcohol use: No    Drug use: No    Sexual activity: Defer         FAMILY HISTORY:  Family History   Problem Relation Age of Onset    Alzheimer's disease Mother     Colon cancer Neg Hx     Colon polyps Neg Hx     Crohn's disease Neg Hx     Irritable bowel syndrome Neg Hx     Ulcerative colitis Neg Hx        REVIEW OF SYSTEMS:  As per HPI         Vitals:    08/12/24 0950   BP: 97/62   Pulse: 70   Resp: 16   Temp: 97.6 °F (36.4 °C)   TempSrc: Oral   SpO2: 96%   Height: 190.5 cm (75\")   PainSc: 0-No pain         8/12/2024     9:50 AM   Current Status   ECOG score 3      PHYSICAL EXAM:    CONSTITUTIONAL:  Vital signs reviewed.  Frail older male  EYES:  Conjunctiva and lids unremarkable.   EARS,NOSE,MOUTH,THROAT:  Ears and nose appear unremarkable.  Lips, teeth, gums appear unremarkable.  RESPIRATORY:  Normal respiratory effort.  Lungs clear to auscultation bilaterally.  CARDIOVASCULAR:  Normal S1, S2.  No murmurs rubs or gallops.  No significant lower extremity edema.  GASTROINTESTINAL: Abdomen appears unremarkable.  No distended  LYMPHATIC:  No cervical, supraclavicular lymphadenopathy.  NEURO: AAO x 3, no focal deficits.  Appears to have equal strength all 4 extremities.  MUSCULOSKELETAL:  Unremarkable digits/nails.  No cyanosis or clubbing.  No apparent joint deformities.  SKIN:  Warm.  No rashes.  PSYCHIATRIC:  Normal judgment and insight.  Normal mood and affect.     RECENT LABS:        Consult on 08/12/2024   Component Date Value Ref Range Status    Transferrin Receptor 08/12/2024 20.7  12.2 - 27.3 nmol/L Final    IgG 08/12/2024 3598 (H)  603 - 1613 mg/dL Final    IgA 08/12/2024 908 (H)  61 - 437 mg/dL Final    Results confirmed on  dilution.    IgM 08/12/2024 53  15 - 143 mg/dL Final    Total Protein 08/12/2024 8.2  6.0 - 8.5 g/dL Final    Albumin 08/12/2024 2.3 (L)  2.9 - 4.4 g/dL Final    Alpha-1-Globulin 08/12/2024 0.4  0.0 - 0.4 g/dL Final    " Alpha-2-Globulin 08/12/2024 1.0  0.4 - 1.0 g/dL Final    Beta Globulin 08/12/2024 1.1  0.7 - 1.3 g/dL Final    Gamma Globulin 08/12/2024 3.4 (H)  0.4 - 1.8 g/dL Final    M-Ze 08/12/2024 Not Observed  Not Observed g/dL Final    Globulin 08/12/2024 5.9 (H)  2.2 - 3.9 g/dL Final    A/G Ratio 08/12/2024 0.4 (L)  0.7 - 1.7 Final    Immunofixation Reflex, Serum 08/12/2024 Comment:   Final    Presence of monoclonal protein is unclear at this time. Suggest  repeat in 3 to 6 months if clinically indicated.    Please note 08/12/2024 Comment   Final    Protein electrophoresis scan will follow via computer, mail, or   delivery.    Free Light Chain, Riggins 08/12/2024 223.7 (H)  3.3 - 19.4 mg/L Final    Free Lambda Light Chains 08/12/2024 104.6 (H)  5.7 - 26.3 mg/L Final    Kappa/Lambda Ratio 08/12/2024 2.14 (H)  0.26 - 1.65 Final   Lab on 08/12/2024   Component Date Value Ref Range Status    WBC 08/12/2024 11.57 (H)  3.40 - 10.80 10*3/mm3 Final    RBC 08/12/2024 3.30 (L)  4.14 - 5.80 10*6/mm3 Final    Hemoglobin 08/12/2024 8.9 (L)  13.0 - 17.7 g/dL Final    Hematocrit 08/12/2024 27.4 (L)  37.5 - 51.0 % Final    MCV 08/12/2024 83.0  79.0 - 97.0 fL Final    MCH 08/12/2024 27.0  26.6 - 33.0 pg Final    MCHC 08/12/2024 32.5  31.5 - 35.7 g/dL Final    RDW 08/12/2024 15.6 (H)  12.3 - 15.4 % Final    RDW-SD 08/12/2024 47.0  37.0 - 54.0 fl Final    MPV 08/12/2024 8.4  6.0 - 12.0 fL Final    Platelets 08/12/2024 364  140 - 450 10*3/mm3 Final    Neutrophil % 08/12/2024 63.3  42.7 - 76.0 % Final    Lymphocyte % 08/12/2024 22.1  19.6 - 45.3 % Final    Monocyte % 08/12/2024 8.6  5.0 - 12.0 % Final    Eosinophil % 08/12/2024 3.6  0.3 - 6.2 % Final    Basophil % 08/12/2024 0.7  0.0 - 1.5 % Final    Immature Grans % 08/12/2024 1.7 (H)  0.0 - 0.5 % Final    Neutrophils, Absolute 08/12/2024 7.32 (H)  1.70 - 7.00 10*3/mm3 Final    Lymphocytes, Absolute 08/12/2024 2.56  0.70 - 3.10 10*3/mm3 Final    Monocytes, Absolute 08/12/2024 0.99 (H)   0.10 - 0.90 10*3/mm3 Final    Eosinophils, Absolute 08/12/2024 0.42 (H)  0.00 - 0.40 10*3/mm3 Final    Basophils, Absolute 08/12/2024 0.08  0.00 - 0.20 10*3/mm3 Final    Immature Grans, Absolute 08/12/2024 0.20 (H)  0.00 - 0.05 10*3/mm3 Final    nRBC 08/12/2024 0.0  0.0 - 0.2 /100 WBC Final   Lab on 08/08/2024   Component Date Value Ref Range Status    Iron 08/08/2024 14 (L)  59 - 158 mcg/dL Final    Iron Saturation (TSAT) 08/08/2024 8 (L)  20 - 50 % Final    Transferrin 08/08/2024 121 (L)  200 - 360 mg/dL Final    TIBC 08/08/2024 180 (L)  298 - 536 mcg/dL Final    Ferritin 08/08/2024 530.20 (H)  30.00 - 400.00 ng/mL Final   Office Visit on 08/08/2024   Component Date Value Ref Range Status    WBC 08/08/2024 15.21 (H)  3.40 - 10.80 10*3/mm3 Final    RBC 08/08/2024 3.22 (L)  4.14 - 5.80 10*6/mm3 Final    Hemoglobin 08/08/2024 8.5 (L)  13.0 - 17.7 g/dL Final    Hematocrit 08/08/2024 27.7 (L)  37.5 - 51.0 % Final    MCV 08/08/2024 86.0  79.0 - 97.0 fL Final    MCH 08/08/2024 26.4 (L)  26.6 - 33.0 pg Final    MCHC 08/08/2024 30.7 (L)  31.5 - 35.7 g/dL Final    RDW 08/08/2024 15.9 (H)  12.3 - 15.4 % Final    RDW-SD 08/08/2024 49.9  37.0 - 54.0 fl Final    MPV 08/08/2024 8.4  6.0 - 12.0 fL Final    Platelets 08/08/2024 471 (H)  140 - 450 10*3/mm3 Final    Glucose 08/08/2024 116 (H)  65 - 99 mg/dL Final    BUN 08/08/2024 14  8 - 23 mg/dL Final    Creatinine 08/08/2024 0.47 (L)  0.76 - 1.27 mg/dL Final    Sodium 08/08/2024 129 (L)  136 - 145 mmol/L Final    Potassium 08/08/2024 4.3  3.5 - 5.2 mmol/L Final    Chloride 08/08/2024 95 (L)  98 - 107 mmol/L Final    CO2 08/08/2024 26.0  22.0 - 29.0 mmol/L Final    Calcium 08/08/2024 8.6  8.6 - 10.5 mg/dL Final    Total Protein 08/08/2024 8.5  6.0 - 8.5 g/dL Final    Albumin 08/08/2024 2.3 (L)  3.5 - 5.2 g/dL Final    ALT (SGPT) 08/08/2024 26  1 - 41 U/L Final    AST (SGOT) 08/08/2024 42 (H)  1 - 40 U/L Final    Alkaline Phosphatase 08/08/2024 101  39 - 117 U/L Final    Total  Bilirubin 08/08/2024 0.3  0.0 - 1.2 mg/dL Final    Globulin 08/08/2024 6.2  gm/dL Final    A/G Ratio 08/08/2024 0.4  g/dL Final    BUN/Creatinine Ratio 08/08/2024 29.8 (H)  7.0 - 25.0 Final    Anion Gap 08/08/2024 8.0  5.0 - 15.0 mmol/L Final    eGFR 08/08/2024 105.1  >60.0 mL/min/1.73 Final    Vitamin B-12 08/08/2024 1,037 (H)  211 - 946 pg/mL Final    Folate 08/08/2024 >20.00  4.78 - 24.20 ng/mL Final    TSH 08/08/2024 14.100 (H)  0.270 - 4.200 uIU/mL Final     ASSESSMENT:  Patient is a 79-year-old male with medical history significant for ankylosing spondylitis, on methotrexate at least since 2015, recent prolonged hospitalization (June 2024) for ESBL UTI and bacteremia comes for anemia evaluation and management.    # Normocytic anemia:  On review of chart, patient's baseline hemoglobin appears to be around 10-11 g/dL since early 2024.  Normal MCV around 85.  Normal WBC and slightly elevated platelet count.  Workup performed in early August 2024 noted iron 14, ferritin 530, iron saturation 8% and TIBC 180.  Soluble transferrin receptor normal at 20.7.  This is most consistent with anemia of chronic disease.    Normal folic acid and vitamin B12 levels.  SPEP/SUMAYA did not show any M spike.  Free light chains elevated, kappa light chain 223.7, lambda light chain 104.6 and FLC ratio 2.14.  Slightly elevated FLC ratio likely inflammatory/autoimmune condition related.    He may need a 24-hour UPEP/SUMAYA.  A stool for occult blood has been requested.  Not performed yet.  This appears to be multifactorial anemia due to chronic disease ankylosing spondylitis, methotrexate use and malnutrition.  Patient was encouraged to maintain adequate nutritional intake via tube feedings.  Will start him on Procrit 20,000 units every 2 weeks for now.  Transfuse to keep hemoglobin > 7    # Dysphagia: On nocturnal tube feedings    # Urinary retention: Has chronic Mariscal's catheter.    # Ankylosing spondylitis: On weekly methotrexate per  rheumatology      PLAN:   -Likely anemia of chronic disease due to ankylosing spondylitis and methotrexate use  -Plan to start Procrit 20,000 units every 2 weeks  -FOBT pending  -Transfuse to keep hemoglobin > 7  -Maintain and improve nutritional intake  -Follow-up in 4-5 weeks or sooner if needed    Orders Placed This Encounter   Procedures    Soluble Transferrin Receptor     Order Specific Question:   Release to patient     Answer:   Routine Release [[id number]]    SUMAYA,PE and FLC, Serum     Order Specific Question:   Release to patient     Answer:   Routine Release [[id number]]   Total time spent during this patient encounter is 65 minutes. The total time spent with the patient includes: preparing to see the patient by reviewing of tests, prior notes or other relevant information, performing appropriate independent examination & evaluation, counseling, ordering of medications, tests or procedures, documenting clinic information in the electronic medical records or other health records, independently interpreting results of tests and communicating the results to the patient/family or caregiver.

## 2024-08-14 LAB — STFR SERPL-SCNC: 20.7 NMOL/L (ref 12.2–27.3)

## 2024-08-15 LAB
ALBUMIN SERPL ELPH-MCNC: 2.3 G/DL (ref 2.9–4.4)
ALBUMIN/GLOB SERPL: 0.4 {RATIO} (ref 0.7–1.7)
ALPHA1 GLOB SERPL ELPH-MCNC: 0.4 G/DL (ref 0–0.4)
ALPHA2 GLOB SERPL ELPH-MCNC: 1 G/DL (ref 0.4–1)
B-GLOBULIN SERPL ELPH-MCNC: 1.1 G/DL (ref 0.7–1.3)
GAMMA GLOB SERPL ELPH-MCNC: 3.4 G/DL (ref 0.4–1.8)
GLOBULIN SER-MCNC: 5.9 G/DL (ref 2.2–3.9)
IGA SERPL-MCNC: 908 MG/DL (ref 61–437)
IGG SERPL-MCNC: 3598 MG/DL (ref 603–1613)
IGM SERPL-MCNC: 53 MG/DL (ref 15–143)
INTERPRETATION SERPL IEP-IMP: ABNORMAL
KAPPA LC FREE SER-MCNC: 223.7 MG/L (ref 3.3–19.4)
KAPPA LC FREE/LAMBDA FREE SER: 2.14 {RATIO} (ref 0.26–1.65)
LABORATORY COMMENT REPORT: ABNORMAL
LAMBDA LC FREE SERPL-MCNC: 104.6 MG/L (ref 5.7–26.3)
M PROTEIN SERPL ELPH-MCNC: ABNORMAL G/DL
PROT SERPL-MCNC: 8.2 G/DL (ref 6–8.5)

## 2024-08-17 ENCOUNTER — HOSPITAL ENCOUNTER (INPATIENT)
Facility: HOSPITAL | Age: 79
LOS: 8 days | Discharge: NURSING, REHAB OR LONG-TERM CARE | DRG: 264 | End: 2024-08-28
Attending: EMERGENCY MEDICINE | Admitting: INTERNAL MEDICINE
Payer: MEDICARE

## 2024-08-17 DIAGNOSIS — L97.424 HEEL ULCER, LEFT, WITH NECROSIS OF BONE: Primary | ICD-10-CM

## 2024-08-17 DIAGNOSIS — S31.000A WOUND OF SACRAL REGION, INITIAL ENCOUNTER: ICD-10-CM

## 2024-08-17 DIAGNOSIS — E61.1 IRON DEFICIENCY: ICD-10-CM

## 2024-08-17 DIAGNOSIS — D64.9 ACUTE ON CHRONIC ANEMIA: ICD-10-CM

## 2024-08-17 DIAGNOSIS — Z97.8 CHRONIC INDWELLING FOLEY CATHETER: ICD-10-CM

## 2024-08-17 PROBLEM — Z93.1 FEEDING BY G-TUBE: Status: ACTIVE | Noted: 2024-08-17

## 2024-08-17 PROBLEM — R79.89 ELEVATED LIVER FUNCTION TESTS: Status: ACTIVE | Noted: 2024-01-01

## 2024-08-17 PROBLEM — E46 MALNUTRITION: Status: ACTIVE | Noted: 2024-01-01

## 2024-08-17 PROBLEM — E78.5 HYPERLIPIDEMIA: Status: ACTIVE | Noted: 2024-08-17

## 2024-08-17 PROBLEM — M45.9 ANKYLOSING SPONDYLITIS: Status: ACTIVE | Noted: 2017-06-29

## 2024-08-17 PROBLEM — R94.6 THYROID FUNCTION TEST ABNORMAL: Status: ACTIVE | Noted: 2024-08-17

## 2024-08-17 PROBLEM — R13.10 DYSPHAGIA: Status: ACTIVE | Noted: 2024-01-01

## 2024-08-17 LAB
ABO GROUP BLD: NORMAL
ALBUMIN SERPL-MCNC: 2.6 G/DL (ref 3.5–5.2)
ALBUMIN/GLOB SERPL: 0.5 G/DL
ALP SERPL-CCNC: 85 U/L (ref 39–117)
ALT SERPL W P-5'-P-CCNC: 61 U/L (ref 1–41)
ANION GAP SERPL CALCULATED.3IONS-SCNC: 5.5 MMOL/L (ref 5–15)
APTT PPP: 36.1 SECONDS (ref 22.7–35.4)
AST SERPL-CCNC: 62 U/L (ref 1–40)
BACTERIA UR QL AUTO: ABNORMAL /HPF
BASOPHILS # BLD AUTO: 0.06 10*3/MM3 (ref 0–0.2)
BASOPHILS NFR BLD AUTO: 0.7 % (ref 0–1.5)
BILIRUB SERPL-MCNC: 0.3 MG/DL (ref 0–1.2)
BILIRUB UR QL STRIP: NEGATIVE
BLD GP AB SCN SERPL QL: NEGATIVE
BUN SERPL-MCNC: 12 MG/DL (ref 8–23)
BUN/CREAT SERPL: 22.2 (ref 7–25)
CALCIUM SPEC-SCNC: 8.9 MG/DL (ref 8.6–10.5)
CHLORIDE SERPL-SCNC: 101 MMOL/L (ref 98–107)
CLARITY UR: ABNORMAL
CO2 SERPL-SCNC: 27.5 MMOL/L (ref 22–29)
COLOR UR: YELLOW
CREAT SERPL-MCNC: 0.54 MG/DL (ref 0.76–1.27)
DEPRECATED RDW RBC AUTO: 43.9 FL (ref 37–54)
EGFRCR SERPLBLD CKD-EPI 2021: 100.7 ML/MIN/1.73
EOSINOPHIL # BLD AUTO: 0.33 10*3/MM3 (ref 0–0.4)
EOSINOPHIL NFR BLD AUTO: 3.9 % (ref 0.3–6.2)
ERYTHROCYTE [DISTWIDTH] IN BLOOD BY AUTOMATED COUNT: 14.6 % (ref 12.3–15.4)
EXPIRATION DATE: NORMAL
FECAL OCCULT BLOOD SCREEN, POC: NEGATIVE
FERRITIN SERPL-MCNC: 869 NG/ML (ref 30–400)
FOLATE SERPL-MCNC: >20 NG/ML (ref 4.78–24.2)
GLOBULIN UR ELPH-MCNC: 5.6 GM/DL
GLUCOSE SERPL-MCNC: 97 MG/DL (ref 65–99)
GLUCOSE UR STRIP-MCNC: NEGATIVE MG/DL
HAPTOGLOB SERPL-MCNC: 361 MG/DL (ref 30–200)
HAV IGM SERPL QL IA: NORMAL
HBV CORE IGM SERPL QL IA: NORMAL
HBV SURFACE AG SERPL QL IA: NORMAL
HCT VFR BLD AUTO: 22.4 % (ref 37.5–51)
HCT VFR BLD AUTO: 23.7 % (ref 37.5–51)
HCV AB SER QL: NORMAL
HGB BLD-MCNC: 6.9 G/DL (ref 13–17.7)
HGB BLD-MCNC: 7.5 G/DL (ref 13–17.7)
HGB UR QL STRIP.AUTO: ABNORMAL
HYALINE CASTS UR QL AUTO: ABNORMAL /LPF
IMM GRANULOCYTES # BLD AUTO: 0.04 10*3/MM3 (ref 0–0.05)
IMM GRANULOCYTES NFR BLD AUTO: 0.5 % (ref 0–0.5)
INR PPP: 1.52 (ref 0.9–1.1)
IRON 24H UR-MRATE: 14 MCG/DL (ref 59–158)
IRON 24H UR-MRATE: 15 MCG/DL (ref 59–158)
IRON SATN MFR SERPL: 8 % (ref 20–50)
IRON SATN MFR SERPL: 8 % (ref 20–50)
KETONES UR QL STRIP: NEGATIVE
LDH SERPL-CCNC: 130 U/L (ref 135–225)
LEUKOCYTE ESTERASE UR QL STRIP.AUTO: ABNORMAL
LYMPHOCYTES # BLD AUTO: 1.72 10*3/MM3 (ref 0.7–3.1)
LYMPHOCYTES NFR BLD AUTO: 20.1 % (ref 19.6–45.3)
Lab: 271
MCH RBC QN AUTO: 26.4 PG (ref 26.6–33)
MCHC RBC AUTO-ENTMCNC: 31.6 G/DL (ref 31.5–35.7)
MCV RBC AUTO: 83.5 FL (ref 79–97)
MONOCYTES # BLD AUTO: 1.01 10*3/MM3 (ref 0.1–0.9)
MONOCYTES NFR BLD AUTO: 11.8 % (ref 5–12)
NEGATIVE CONTROL: NEGATIVE
NEUTROPHILS NFR BLD AUTO: 5.39 10*3/MM3 (ref 1.7–7)
NEUTROPHILS NFR BLD AUTO: 63 % (ref 42.7–76)
NITRITE UR QL STRIP: POSITIVE
NRBC BLD AUTO-RTO: 0 /100 WBC (ref 0–0.2)
PH UR STRIP.AUTO: 7 [PH] (ref 5–8)
PLATELET # BLD AUTO: 498 10*3/MM3 (ref 140–450)
PMV BLD AUTO: 8.2 FL (ref 6–12)
POSITIVE CONTROL: POSITIVE
POTASSIUM SERPL-SCNC: 4.1 MMOL/L (ref 3.5–5.2)
PROT SERPL-MCNC: 8.2 G/DL (ref 6–8.5)
PROT UR QL STRIP: ABNORMAL
PROTHROMBIN TIME: 18.6 SECONDS (ref 11.7–14.2)
RBC # BLD AUTO: 2.84 10*6/MM3 (ref 4.14–5.8)
RBC # UR STRIP: ABNORMAL /HPF
REF LAB TEST METHOD: ABNORMAL
RETICS # AUTO: 0.06 10*6/MM3 (ref 0.02–0.13)
RETICS/RBC NFR AUTO: 2.24 % (ref 0.7–1.9)
RH BLD: POSITIVE
SODIUM SERPL-SCNC: 134 MMOL/L (ref 136–145)
SP GR UR STRIP: 1.02 (ref 1–1.03)
SQUAMOUS #/AREA URNS HPF: ABNORMAL /HPF
T&S EXPIRATION DATE: NORMAL
T4 FREE SERPL-MCNC: 0.91 NG/DL (ref 0.92–1.68)
TIBC SERPL-MCNC: 173 MCG/DL (ref 298–536)
TIBC SERPL-MCNC: 180 MCG/DL (ref 298–536)
TRANSFERRIN SERPL-MCNC: 116 MG/DL (ref 200–360)
TRANSFERRIN SERPL-MCNC: 121 MG/DL (ref 200–360)
TSH SERPL DL<=0.05 MIU/L-ACNC: 6.8 UIU/ML (ref 0.27–4.2)
UROBILINOGEN UR QL STRIP: ABNORMAL
VIT B12 BLD-MCNC: 914 PG/ML (ref 211–946)
WBC # UR STRIP: ABNORMAL /HPF
WBC NRBC COR # BLD AUTO: 8.55 10*3/MM3 (ref 3.4–10.8)

## 2024-08-17 PROCEDURE — 84481 FREE ASSAY (FT-3): CPT | Performed by: INTERNAL MEDICINE

## 2024-08-17 PROCEDURE — 85014 HEMATOCRIT: CPT | Performed by: INTERNAL MEDICINE

## 2024-08-17 PROCEDURE — G0378 HOSPITAL OBSERVATION PER HR: HCPCS

## 2024-08-17 PROCEDURE — 82746 ASSAY OF FOLIC ACID SERUM: CPT | Performed by: INTERNAL MEDICINE

## 2024-08-17 PROCEDURE — 85018 HEMOGLOBIN: CPT | Performed by: INTERNAL MEDICINE

## 2024-08-17 PROCEDURE — 82607 VITAMIN B-12: CPT | Performed by: INTERNAL MEDICINE

## 2024-08-17 PROCEDURE — 87102 FUNGUS ISOLATION CULTURE: CPT | Performed by: INTERNAL MEDICINE

## 2024-08-17 PROCEDURE — 81001 URINALYSIS AUTO W/SCOPE: CPT | Performed by: EMERGENCY MEDICINE

## 2024-08-17 PROCEDURE — 99285 EMERGENCY DEPT VISIT HI MDM: CPT

## 2024-08-17 PROCEDURE — 51702 INSERT TEMP BLADDER CATH: CPT

## 2024-08-17 PROCEDURE — 87040 BLOOD CULTURE FOR BACTERIA: CPT | Performed by: INTERNAL MEDICINE

## 2024-08-17 PROCEDURE — 92610 EVALUATE SWALLOWING FUNCTION: CPT

## 2024-08-17 PROCEDURE — 25010000002 CEFTRIAXONE PER 250 MG: Performed by: INTERNAL MEDICINE

## 2024-08-17 PROCEDURE — 85045 AUTOMATED RETICULOCYTE COUNT: CPT | Performed by: INTERNAL MEDICINE

## 2024-08-17 PROCEDURE — 86850 RBC ANTIBODY SCREEN: CPT | Performed by: EMERGENCY MEDICINE

## 2024-08-17 PROCEDURE — 84466 ASSAY OF TRANSFERRIN: CPT | Performed by: EMERGENCY MEDICINE

## 2024-08-17 PROCEDURE — 86901 BLOOD TYPING SEROLOGIC RH(D): CPT

## 2024-08-17 PROCEDURE — 84443 ASSAY THYROID STIM HORMONE: CPT | Performed by: INTERNAL MEDICINE

## 2024-08-17 PROCEDURE — 86900 BLOOD TYPING SEROLOGIC ABO: CPT | Performed by: EMERGENCY MEDICINE

## 2024-08-17 PROCEDURE — 85025 COMPLETE CBC W/AUTO DIFF WBC: CPT | Performed by: EMERGENCY MEDICINE

## 2024-08-17 PROCEDURE — 82728 ASSAY OF FERRITIN: CPT | Performed by: INTERNAL MEDICINE

## 2024-08-17 PROCEDURE — 83540 ASSAY OF IRON: CPT | Performed by: EMERGENCY MEDICINE

## 2024-08-17 PROCEDURE — 87186 SC STD MICRODIL/AGAR DIL: CPT | Performed by: EMERGENCY MEDICINE

## 2024-08-17 PROCEDURE — 82270 OCCULT BLOOD FECES: CPT | Performed by: EMERGENCY MEDICINE

## 2024-08-17 PROCEDURE — 85730 THROMBOPLASTIN TIME PARTIAL: CPT | Performed by: EMERGENCY MEDICINE

## 2024-08-17 PROCEDURE — 83010 ASSAY OF HAPTOGLOBIN QUANT: CPT | Performed by: INTERNAL MEDICINE

## 2024-08-17 PROCEDURE — 87077 CULTURE AEROBIC IDENTIFY: CPT | Performed by: EMERGENCY MEDICINE

## 2024-08-17 PROCEDURE — 83615 LACTATE (LD) (LDH) ENZYME: CPT | Performed by: INTERNAL MEDICINE

## 2024-08-17 PROCEDURE — 80053 COMPREHEN METABOLIC PANEL: CPT | Performed by: EMERGENCY MEDICINE

## 2024-08-17 PROCEDURE — 86901 BLOOD TYPING SEROLOGIC RH(D): CPT | Performed by: EMERGENCY MEDICINE

## 2024-08-17 PROCEDURE — 25010000002 FENTANYL CITRATE (PF) 50 MCG/ML SOLUTION: Performed by: EMERGENCY MEDICINE

## 2024-08-17 PROCEDURE — 87086 URINE CULTURE/COLONY COUNT: CPT | Performed by: EMERGENCY MEDICINE

## 2024-08-17 PROCEDURE — 86900 BLOOD TYPING SEROLOGIC ABO: CPT

## 2024-08-17 PROCEDURE — 84439 ASSAY OF FREE THYROXINE: CPT | Performed by: INTERNAL MEDICINE

## 2024-08-17 PROCEDURE — 85610 PROTHROMBIN TIME: CPT | Performed by: EMERGENCY MEDICINE

## 2024-08-17 PROCEDURE — 80074 ACUTE HEPATITIS PANEL: CPT | Performed by: INTERNAL MEDICINE

## 2024-08-17 RX ORDER — SODIUM CHLORIDE 0.9 % (FLUSH) 0.9 %
10 SYRINGE (ML) INJECTION EVERY 12 HOURS SCHEDULED
Status: DISCONTINUED | OUTPATIENT
Start: 2024-08-17 | End: 2024-08-28 | Stop reason: HOSPADM

## 2024-08-17 RX ORDER — BISACODYL 10 MG
10 SUPPOSITORY, RECTAL RECTAL DAILY PRN
Status: DISCONTINUED | OUTPATIENT
Start: 2024-08-17 | End: 2024-08-21

## 2024-08-17 RX ORDER — CYCLOBENZAPRINE HCL 10 MG
5 TABLET ORAL NIGHTLY PRN
Status: DISCONTINUED | OUTPATIENT
Start: 2024-08-17 | End: 2024-08-28 | Stop reason: HOSPADM

## 2024-08-17 RX ORDER — SODIUM CHLORIDE 0.9 % (FLUSH) 0.9 %
10 SYRINGE (ML) INJECTION AS NEEDED
Status: DISCONTINUED | OUTPATIENT
Start: 2024-08-17 | End: 2024-08-28 | Stop reason: HOSPADM

## 2024-08-17 RX ORDER — SODIUM CHLORIDE 9 MG/ML
40 INJECTION, SOLUTION INTRAVENOUS AS NEEDED
Status: DISCONTINUED | OUTPATIENT
Start: 2024-08-17 | End: 2024-08-28 | Stop reason: HOSPADM

## 2024-08-17 RX ORDER — CIPROFLOXACIN HYDROCHLORIDE 3.5 MG/ML
2 SOLUTION/ DROPS TOPICAL EVERY 4 HOURS
Status: DISPENSED | OUTPATIENT
Start: 2024-08-17 | End: 2024-08-24

## 2024-08-17 RX ORDER — ATORVASTATIN CALCIUM 20 MG/1
40 TABLET, FILM COATED ORAL DAILY
Status: DISCONTINUED | OUTPATIENT
Start: 2024-08-17 | End: 2024-08-28 | Stop reason: HOSPADM

## 2024-08-17 RX ORDER — NITROGLYCERIN 0.4 MG/1
0.4 TABLET SUBLINGUAL
Status: DISCONTINUED | OUTPATIENT
Start: 2024-08-17 | End: 2024-08-28 | Stop reason: HOSPADM

## 2024-08-17 RX ORDER — FENTANYL CITRATE 50 UG/ML
50 INJECTION, SOLUTION INTRAMUSCULAR; INTRAVENOUS ONCE
Status: COMPLETED | OUTPATIENT
Start: 2024-08-17 | End: 2024-08-17

## 2024-08-17 RX ORDER — ACETAMINOPHEN 650 MG/1
650 SUPPOSITORY RECTAL EVERY 4 HOURS PRN
Status: DISCONTINUED | OUTPATIENT
Start: 2024-08-17 | End: 2024-08-28 | Stop reason: HOSPADM

## 2024-08-17 RX ORDER — ACETAMINOPHEN 325 MG/1
650 TABLET ORAL EVERY 4 HOURS PRN
Status: DISCONTINUED | OUTPATIENT
Start: 2024-08-17 | End: 2024-08-28 | Stop reason: HOSPADM

## 2024-08-17 RX ORDER — ONDANSETRON 4 MG/1
4 TABLET, ORALLY DISINTEGRATING ORAL EVERY 6 HOURS PRN
Status: DISCONTINUED | OUTPATIENT
Start: 2024-08-17 | End: 2024-08-28 | Stop reason: HOSPADM

## 2024-08-17 RX ORDER — HYDROCODONE BITARTRATE AND ACETAMINOPHEN 5; 325 MG/1; MG/1
1 TABLET ORAL EVERY 6 HOURS PRN
Status: DISCONTINUED | OUTPATIENT
Start: 2024-08-17 | End: 2024-08-20

## 2024-08-17 RX ORDER — TERAZOSIN 1 MG/1
1 CAPSULE ORAL NIGHTLY
Status: DISCONTINUED | OUTPATIENT
Start: 2024-08-17 | End: 2024-08-28 | Stop reason: HOSPADM

## 2024-08-17 RX ORDER — ACETAMINOPHEN 160 MG/5ML
650 SOLUTION ORAL EVERY 4 HOURS PRN
Status: DISCONTINUED | OUTPATIENT
Start: 2024-08-17 | End: 2024-08-28 | Stop reason: HOSPADM

## 2024-08-17 RX ORDER — AMOXICILLIN 250 MG
2 CAPSULE ORAL 2 TIMES DAILY
Status: DISCONTINUED | OUTPATIENT
Start: 2024-08-17 | End: 2024-08-21

## 2024-08-17 RX ORDER — ONDANSETRON 2 MG/ML
4 INJECTION INTRAMUSCULAR; INTRAVENOUS EVERY 6 HOURS PRN
Status: DISCONTINUED | OUTPATIENT
Start: 2024-08-17 | End: 2024-08-28 | Stop reason: HOSPADM

## 2024-08-17 RX ORDER — BISACODYL 5 MG/1
5 TABLET, DELAYED RELEASE ORAL DAILY PRN
Status: DISCONTINUED | OUTPATIENT
Start: 2024-08-17 | End: 2024-08-21

## 2024-08-17 RX ORDER — METHOTREXATE 2.5 MG/1
2.5 TABLET ORAL
Status: DISCONTINUED | OUTPATIENT
Start: 2024-08-21 | End: 2024-08-28 | Stop reason: HOSPADM

## 2024-08-17 RX ORDER — POTASSIUM CHLORIDE 20MEQ/15ML
20 LIQUID (ML) ORAL DAILY
Status: DISCONTINUED | OUTPATIENT
Start: 2024-08-17 | End: 2024-08-18 | Stop reason: CLARIF

## 2024-08-17 RX ORDER — POLYETHYLENE GLYCOL 3350 17 G/17G
17 POWDER, FOR SOLUTION ORAL DAILY PRN
Status: DISCONTINUED | OUTPATIENT
Start: 2024-08-17 | End: 2024-08-21

## 2024-08-17 RX ADMIN — ATORVASTATIN CALCIUM 40 MG: 20 TABLET, FILM COATED ORAL at 16:40

## 2024-08-17 RX ADMIN — Medication 10 ML: at 20:52

## 2024-08-17 RX ADMIN — CIPROFLOXACIN 2 DROP: 3 SOLUTION OPHTHALMIC at 23:48

## 2024-08-17 RX ADMIN — CEFTRIAXONE 2000 MG: 2 INJECTION, POWDER, FOR SOLUTION INTRAMUSCULAR; INTRAVENOUS at 16:39

## 2024-08-17 RX ADMIN — CIPROFLOXACIN 2 DROP: 3 SOLUTION OPHTHALMIC at 20:30

## 2024-08-17 RX ADMIN — FENTANYL CITRATE 50 MCG: 50 INJECTION, SOLUTION INTRAMUSCULAR; INTRAVENOUS at 05:31

## 2024-08-17 RX ADMIN — SENNOSIDES AND DOCUSATE SODIUM 2 TABLET: 50; 8.6 TABLET ORAL at 20:52

## 2024-08-17 NOTE — ED NOTES
Nursing report ED to floor  Anthony Gallegos  79 y.o.  male    HPI :  HPI (Adult)  Stated Reason for Visit: pt to er via EMS from nursing home for Abnormal lab value of low HGB and sediment in catheter bag. Per nursing home HGB was 6.8. pt baseline is A7Ox2 per ems.  History Obtained From: EMS    Chief Complaint  Chief Complaint   Patient presents with    Abnormal Lab    Urinary Tract Infection       Admitting doctor:   Luther Rojas MD    Admitting diagnosis:   The primary encounter diagnosis was Acute on chronic anemia. Diagnoses of Wound of sacral region, initial encounter, Chronic indwelling Mariscal catheter, and Iron deficiency were also pertinent to this visit.    Code status:   Current Code Status       Date Active Code Status Order ID Comments User Context       Prior            Allergies:   Patient has no known allergies.    Isolation:   No active isolations    Intake and Output  No intake or output data in the 24 hours ending 08/17/24 0649    Weight:   There were no vitals filed for this visit.    Most recent vitals:   Vitals:    08/17/24 0438 08/17/24 0621   BP: 124/68 114/59   Pulse: 78 77   Resp: 18    Temp: 97.5 °F (36.4 °C)    SpO2: 100% 100%       Active LDAs/IV Access:   Lines, Drains & Airways       Active LDAs       Name Placement date Placement time Site Days    Peripheral IV 08/17/24 0521 Anterior;Distal;Left Forearm 08/17/24  0521  Forearm  less than 1    Peripheral IV 08/17/24 0530 Right Antecubital 08/17/24  0530  Antecubital  less than 1    Gastrostomy/Enterostomy Percutaneous endoscopic gastrostomy (PEG) 20 Fr. LUQ 03/29/24  0715  LUQ  140    Urethral Catheter Straight-tip 16 Fr. 08/17/24  0624  -- less than 1                    Labs (abnormal labs have a star):   Labs Reviewed   COMPREHENSIVE METABOLIC PANEL - Abnormal; Notable for the following components:       Result Value    Creatinine 0.54 (*)     Sodium 134 (*)     Albumin 2.6 (*)     ALT (SGPT) 61 (*)     AST (SGOT) 62 (*)     All  other components within normal limits    Narrative:     GFR Normal >60  Chronic Kidney Disease <60  Kidney Failure <15    The GFR formula is only valid for adults with stable renal function between ages 18 and 70.   URINALYSIS W/ CULTURE IF INDICATED - Abnormal; Notable for the following components:    Appearance, UA Turbid (*)     Blood, UA Small (1+) (*)     Protein,  mg/dL (2+) (*)     Leuk Esterase, UA Large (3+) (*)     Nitrite, UA Positive (*)     All other components within normal limits    Narrative:     In absence of clinical symptoms, the presence of pyuria, bacteria, and/or nitrites on the urinalysis result does not correlate with infection.   CBC WITH AUTO DIFFERENTIAL - Abnormal; Notable for the following components:    RBC 2.84 (*)     Hemoglobin 7.5 (*)     Hematocrit 23.7 (*)     MCH 26.4 (*)     Platelets 498 (*)     Monocytes, Absolute 1.01 (*)     All other components within normal limits   IRON PROFILE - Abnormal; Notable for the following components:    Iron 15 (*)     Iron Saturation (TSAT) 8 (*)     Transferrin 121 (*)     TIBC 180 (*)     All other components within normal limits   APTT   PROTIME-INR   URINALYSIS, MICROSCOPIC ONLY   POCT OCCULT BLOOD STOOL (ED ONLY)   TYPE AND SCREEN   CBC AND DIFFERENTIAL    Narrative:     The following orders were created for panel order CBC & Differential.  Procedure                               Abnormality         Status                     ---------                               -----------         ------                     CBC Auto Differential[[id number]]        Abnormal            Final result                 Please view results for these tests on the individual orders.       EKG:   No orders to display       Meds given in ED:   Medications   sodium chloride 0.9 % flush 10 mL (has no administration in time range)   fentaNYL citrate (PF) (SUBLIMAZE) injection 50 mcg (50 mcg Intravenous Given 8/17/24 0531)       Imaging results:  No radiology  results for the last day    Ambulatory status:   - stretcher    Social issues:   Social History     Socioeconomic History    Marital status:     Number of children: 2   Tobacco Use    Smoking status: Never    Smokeless tobacco: Never   Vaping Use    Vaping status: Never Used   Substance and Sexual Activity    Alcohol use: No    Drug use: No    Sexual activity: Defer       Peripheral Neurovascular  Peripheral Neurovascular (Adult)  Peripheral Neurovascular WDL: WDL    Neuro Cognitive  Neuro Cognitive (Adult)  Cognitive/Neuro/Behavioral WDL: WDL    Learning  Learning Assessment (Adult)  Learning Readiness and Ability: no barriers identified    Respiratory  Respiratory WDL  Respiratory WDL: WDL    Abdominal Pain       Pain Assessments  Pain (Adult)  (0-10) Pain Rating: Rest: 10  (0-10) Pain Rating: Activity: 10  Preferred Pain Scale: PAINAD (Pain Assessment in Advance Dementia Scale)  PAINAD Breathin-->noisy labored breathing, long period of hyperventilation, Cheyne-Reich respirations  PAINAD Negative Vocalization: 1-->occasional moan/groan, low speech, negative/disapproving quality  PAINAD Facial Expression: 1-->sad, frightened, frown  PAINAD Body Language: 1-->tense, distressed pacing, fidgeting  PAINAD Consolability: 2-->unable to console, distract or reassure  PAINAD Score: 7    NIH Stroke Scale       Teddy Tanner RN  24 06:49 EDT

## 2024-08-17 NOTE — CONSULTS
Nutrition Services    Patient Name:  Anthony Gallegos  YOB: 1945  MRN: [id number]  Admit Date:  8/17/2024  Assessment Date:  08/17/24    Summary: Nutrition Consult per RN admission screen.   This is a 79 y.o. male with history of ankylosing spondylitis, hypertension, and BPH with chronically indwelling Mariscal who is admitted for acute anemia and concern for debris in urinary catheter.  Labs Na 134, platelets 498, alt 61, alb 2.6, hgb/hct 7.5/23.7  Multiple Skin issues noted  BMI 17.53, 35lb wt loss noted x 4 mo. Failure to thrive  Pt has a gtube and currently NPO--hx of dysphagia(last speech eval--pt on pureed with thicken liquids.)   Spoke with RN, plan is to have speech evaluate for po. It is noted per past notes, po intake was small.    Plan/Recommendations  Speech to evaluate for appropriate diet  Gtube feeds of Nutren 2.0 start at 10ml/hr due to risk of refeeding syndrome and work up 10ml q4hr to goal at 50ml/hr  Water flushes [id number]--MV to manage  Erich 1 pkg bolus BID via gtube for wound healing  RD to follow    CLINICAL NUTRITION ASSESSMENT      Reason for Assessment Nurse Admission Screen     Diagnosis/Problem   Anemia, failure to thrive, malnutrition   Medical/Surgical History Past Medical History:   Diagnosis Date    Abscess of scrotum     MARTITA (acute kidney injury)     Altered mental state     Arthritis     AS (ankylosing spondylitis)     History of MRSA infection     Hypertension     Leukocytosis     Tetrahydrocannabinol (THC) use disorder, mild, abuse 12/20/2023    Uveitis        Past Surgical History:   Procedure Laterality Date    COLONOSCOPY      ENDOSCOPY W/ PEG TUBE PLACEMENT N/A 3/29/2024    Procedure: ESOPHAGOGASTRODUODENOSCOPY WITH PERCUTANEOUS ENDOSCOPIC GASTROSTOMY TUBE INSERTION;  Surgeon: Khadar Broderick MD;  Location: [facility] ENDOSCOPY;  Service: General;  Laterality: N/A;  PRE/POST - DYSPHAGIA    ROTATOR CUFF REPAIR Right     TOTAL HIP ARTHROPLASTY Left 2014     "    Anthropometrics        Current Height  Current Weight  BMI kg/m2 Height: 190.5 cm (75\")  Weight: 63.6 kg (140 lb 3.4 oz) (08/17/24 0730)  Body mass index is 17.53 kg/m².   Adjusted BMI (if applicable)    BMI Category Underweight (18.4 or below)   Ideal Body Weight (IBW) 186lb   Usual Body Weight (UBW) 180's   Weight Trend Loss   Weight History Wt Readings from Last 30 Encounters:   08/17/24 0730 63.6 kg (140 lb 3.4 oz)   08/08/24 1044 63.6 kg (140 lb 4.8 oz)   06/27/24 0300 71 kg (156 lb 8.4 oz)   06/26/24 0500 71 kg (156 lb 8.4 oz)   06/25/24 0545 71 kg (156 lb 8.4 oz)   06/23/24 0600 60.4 kg (133 lb 2.5 oz)   06/22/24 0556 61.8 kg (136 lb 3.9 oz)   06/21/24 0500 62 kg (136 lb 11 oz)   06/20/24 0419 65.4 kg (144 lb 2.9 oz)   06/17/24 2351 63.1 kg (139 lb 1.6 oz)   04/09/24 0516 90.4 kg (199 lb 4.7 oz)   04/08/24 0420 90.5 kg (199 lb 8.3 oz)   04/07/24 0600 81 kg (178 lb 9.2 oz)   04/06/24 0500 80 kg (176 lb 5.9 oz)   04/03/24 0521 81.4 kg (179 lb 7.3 oz)   04/02/24 0435 80.4 kg (177 lb 4 oz)   04/01/24 0420 80.9 kg (178 lb 5.6 oz)   03/31/24 0530 82.7 kg (182 lb 6.4 oz)   03/30/24 0557 82.2 kg (181 lb 3.5 oz)   03/29/24 0548 82.6 kg (182 lb 1.6 oz)   03/28/24 0500 76.9 kg (169 lb 8 oz)   03/27/24 0617 76.4 kg (168 lb 6.4 oz)   03/25/24 2123 69.1 kg (152 lb 6.4 oz)   03/25/24 1516 79 kg (174 lb 2.6 oz)   01/31/24 0639 79 kg (174 lb 3.2 oz)   01/29/24 0509 78.8 kg (173 lb 11.6 oz)   01/28/24 0530 77.7 kg (171 lb 4.8 oz)   01/25/24 0537 76.4 kg (168 lb 6.9 oz)   01/23/24 0542 73.2 kg (161 lb 6 oz)   01/22/24 0502 75.1 kg (165 lb 9.1 oz)   01/21/24 2304 75.5 kg (166 lb 7.2 oz)   01/21/24 1744 81.6 kg (180 lb)   01/08/24 0253 81.6 kg (179 lb 14.3 oz)   01/05/24 0440 87.9 kg (193 lb 12.6 oz)   01/04/24 0439 85.6 kg (188 lb 11.4 oz)   01/03/24 0513 82.5 kg (181 lb 14.1 oz)   12/31/23 1300 80.5 kg (177 lb 7.5 oz)   12/31/23 0350 83.8 kg (184 lb 11.9 oz)   12/30/23 0511 85.3 kg (188 lb 0.8 oz)   12/29/23 0341 85.7 kg " (188 lb 15 oz)   12/28/23 0435 85.4 kg (188 lb 4.4 oz)   12/27/23 0755 81 kg (178 lb 9.2 oz)   12/27/23 0423 85.4 kg (188 lb 4.4 oz)   12/25/23 0707 85.2 kg (187 lb 13.3 oz)   12/23/23 0608 81.3 kg (179 lb 3.7 oz)   12/22/23 0430 81.8 kg (180 lb 5.4 oz)   12/20/23 1005 81.9 kg (180 lb 8 oz)   03/27/18 1439 100 kg (221 lb)   03/22/18 1613 101 kg (222 lb 12.8 oz)   01/23/18 1414 101 kg (223 lb)   12/26/17 1111 99.8 kg (220 lb)   10/19/17 1239 95.3 kg (210 lb)   08/30/17 1459 91.3 kg (201 lb 3.2 oz)   08/29/17 1221 93 kg (205 lb)   08/10/17 1517 88 kg (194 lb)   07/14/17 1041 85.9 kg (189 lb 6.4 oz)   06/29/17 1318 84.5 kg (186 lb 3.2 oz)   06/21/17 1922 90.7 kg (200 lb)      --  Labs       Pertinent Labs    Results from last 7 days   Lab Units 08/17/24  0522   SODIUM mmol/L 134*   POTASSIUM mmol/L 4.1   CHLORIDE mmol/L 101   CO2 mmol/L 27.5   BUN mg/dL 12   CREATININE mg/dL 0.54*   CALCIUM mg/dL 8.9   BILIRUBIN mg/dL 0.3   ALK PHOS U/L 85   ALT (SGPT) U/L 61*   AST (SGOT) U/L 62*   GLUCOSE mg/dL 97     Results from last 7 days   Lab Units 08/17/24  0522   HEMOGLOBIN g/dL 7.5*   HEMATOCRIT % 23.7*   WBC 10*3/mm3 8.55   ALBUMIN g/dL 2.6*     Results from last 7 days   Lab Units 08/17/24  0522 08/16/24  2115 08/12/24  0920   INR  1.52*  --   --    APTT seconds 36.1*  --   --    PLATELETS 10*3/mm3 498* 470* 364     COVID19   Date Value Ref Range Status   03/25/2024 Not Detected Not Detected - Ref. Range Final     Lab Results   Component Value Date    HGBA1C 5.80 (H) 01/23/2024          Medications           Scheduled Medications     Infusions     PRN Medications   [COMPLETED] Insert Peripheral IV **AND** sodium chloride     Physical Findings          General Findings generalized wasting, loss of muscle mass, loss of subcutaneous fat   Oral/Mouth Cavity tooth or teeth missing   Edema  no edema   Gastrointestinal fecal incontinence   Skin  pressure injury: left posterior perineum, right distal leg, right posterior greater  trochanter, left posterior heel, right lateral foot, posterior penis, bilateral coccyx   Tubes/Drains/Lines gastrostomy tube   NFPE See Malnutrition Severity Assessment, Date Completed: 8/17   --  Malnutrition Severity Assessment      Patient meets criteria for : Severe Malnutrition  Malnutrition Type (Last 8 Hours)       Malnutrition Severity Assessment       Row Name 08/17/24 1016       Malnutrition Severity Assessment    Malnutrition Type Chronic Disease - Related Malnutrition      Row Name 08/17/24 1016       Insufficient Energy Intake     Insufficient Energy Intake Findings Severe    Insufficient Energy Intake  <75% of est. energy requirement for > or equal to 1 month      Row Name 08/17/24 1016       Muscle Loss    Protestant Region Severe - deep hollowing/scooping, lack of muscle to touch, facial bones well defined    Clavicle Bone Region Severe - protruding prominent bone    Acromion Bone Region Severe - squared shoulders, bones, and acromion process protrusion prominent    Dorsal Hand Region Severe - prominent depression      Row Name 08/17/24 1016       Fat Loss    Orbital Region  Severe - pronounced hollowness/depression, dark circles, loose saggy skin    Upper Arm Region Severe - mostly skin, very little space between folds, fingers touch      Row Name 08/17/24 1016       Declining Functional Status    Declining Functional Status Findings Measurably Reduced      Row Name 08/17/24 1016       Criteria Met (Must meet criteria for severity in at least 2 of these categories: M Wasting, Fat Loss, Fluid, Secondary Signs, Wt. Status, Intake)    Patient meets criteria for  Severe Malnutrition                       Estimated/Assessed Needs        Current Weight  Weight: 63.6 kg (140 lb 3.4 oz) (08/17/24 0730)       Energy Requirements    Weight for Calculation 63.6 kg   Method for Estimation  30-35 kcal/kg   EST Needs (kcal/day) 5351-3555       Protein Requirements    Weight for Calculation 63.6 kg   EST Protein Needs  (g/kg) 1.5 gm/kg   EST Daily Needs (g/day) 95       Fluid Requirements     Method for Estimation 1 mL/kcal    EST Needs (mL/day)      Current Nutrition Orders & Evaluation of Intake       Oral Nutrition     Food Allergies NKFA   Current PO Diet No diet orders on file   Supplement n/a   PO Evaluation     % PO Intake npo    Factors Affecting Intake: chewing difficulty, early satiety , swallow impairment   --  PES STATEMENT / NUTRITION DIAGNOSIS      Nutrition Dx Problem  Problem: Underweight, Unintentional Weight Loss, Malnutrition (severe), Inadequate Oral Intake, Increased Nutrient Needs, Biting/Chewing Difficulty, and Swallowing Difficulty  Etiology: Medical Diagnosis - anemia    Signs/Symptoms: Report/Observation     NUTRITION INTERVENTION / PLAN OF CARE      Intervention Goal(s) Reduce/improve symptoms, Meet estimated needs, Disease management/therapy, Initiate feeding/diet, Initiate TF/PN, and Appropriate weight gain         RD Intervention/Action Await initiation/advancement of PO diet, Await initiation of EN/PN, Continue to monitor, Care plan reviewed, and Recommend/order: enteral   --      Prescription/Orders:       PO Diet Recommend speech to evaluate      Supplements       Enteral Nutrition TF's due to inadequate po intake      Parenteral Nutrition    New Prescription Ordered? No, Recommended   --   Enteral Prescription:     Enteral Route Gastrostomy    TF Delivery Method Continuous    Enteral Product Nutren 2.0    Modular Juven1 pkg BID    Propofol Rate/Kcal     TF Start Rate  10 mL/hr pt at risk for refeeding syndrome    TF Goal Rate  45 mL/hr    Free Water Flush 30 mL Q 4 hr    Provision at Goal:          Calories 2160 kcal plus loreto (180kcals), meets 100% needs         Protein  90 gm protein, meets 95% needs         Fluid (mL) 745 mL free water + 180 mL in flushes         Monitor/Evaluation Per protocol, I&O, PO intake, Pertinent labs, EN delivery/tolerance, Weight, Skin status, GI status, Symptoms,  POC/GOC, Swallow function   Discharge Plan/Needs Pending clinical course   --    RD to follow per protocol.      Electronically signed by:  Tamar Ott RD  08/17/24 10:00 EDT

## 2024-08-17 NOTE — PLAN OF CARE
Goal Outcome Evaluation:              Outcome Evaluation: Clinical swallow evaluation complete. Known to speech therapy care. Previous VFSS 6/2024 recommended puree/honey thick liquid diet (ice chips between meals). PEG since 3/2024. This date, pt reports he is motivated to eat/drink by mouth; requesting KF. Immediate throat clearing exhibited with ice chips. No overt s/s of aspiration with honey thick liquid via spoon/cup or x2/3 trials of puree. x1 delayed throat clear with puree. Recommend puree diet with honey thick liquids by cup. Meds via alternate route or crushed in puree. Sitting upright, slow rate, small bites/sips, supervision/feeding assist with meals. With negative lung changes, make NPO and rely solely on PEG for nutrition. Speech to follow for diet tolerance.                 SLP Swallowing Diagnosis: oral dysphagia, pharyngeal dysphagia (08/17/24 1300)

## 2024-08-17 NOTE — H&P
Patient Name:  Anthony Gallegos  YOB: 1945  MRN:  [id number]  Admit Date:  8/17/2024  Patient Care Team:  Keshav Eason MD as PCP - General (Internal Medicine)  Placido Shirley APRN as Referring Physician (Gastroenterology)  Rick Quarles MD as Consulting Physician (Hematology and Oncology)        Chief complaint.  Sent from a nursing facility secondary to low hemoglobin.    History Present Illness     A pleasant 79 years old gentleman with a past history of ankylosing spondylitis/hypertension/benign prostatic hypertrophy with urine outflow obstruction on indwelling Mariscal catheter/DISH/mortality/dysphagia/GERD/malnutrition/G-tube feeds/dyslipidemia who was recently diagnosed with iron deficiency anemia by hematology oncology and was supposed to be scheduled on IV iron.  Patient baseline hemoglobin is between 8 and 9.  Repeat CBC in the nursing home was 6.7 and the patient was sent for further evaluation.  Patient denies any abdominal pain.  No nausea or vomiting.  He reports no diarrhea or constipation.  No bleeding per rectum or melena.  No bleeding diathesis.  He is not on any anticoagulation.  He denies any new weakness or fatigue.  No dizziness or loss of consciousness.  No focal neurological symptoms.  In the emergency department her CBC was normal except a hemoglobin of 7.5 and platelets of 498.  INR was 1.5.  PTT was 36.1.  CMP normal except sodium of 134, albumin 2.6, ALT 61, AST 62.  UA suggestive of UTI (from a new Mariscal catheter.  Patient was subsequently admitted        Review of Systems   Cardio vascular/respiratory.  No chest pain.  Positive occasional dry cough.  No shortness of breath.  No palpitation.  No ankle edema.  .  Cloudy urine in the Mariscal catheter.  No blood in the Mariscal catheter.    Personal History     Past Medical History:   Diagnosis Date   • Abscess of scrotum    • MARTITA (acute kidney injury)    • Altered mental state    • Arthritis    • AS (ankylosing  spondylitis)    • History of MRSA infection    • Hypertension    • Leukocytosis    • Tetrahydrocannabinol (THC) use disorder, mild, abuse 12/20/2023   • Uveitis      Past Surgical History:   Procedure Laterality Date   • COLONOSCOPY     • ENDOSCOPY W/ PEG TUBE PLACEMENT N/A 3/29/2024    Procedure: ESOPHAGOGASTRODUODENOSCOPY WITH PERCUTANEOUS ENDOSCOPIC GASTROSTOMY TUBE INSERTION;  Surgeon: Khadar Broderick MD;  Location: [facility] ENDOSCOPY;  Service: General;  Laterality: N/A;  PRE/POST - DYSPHAGIA   • ROTATOR CUFF REPAIR Right    • TOTAL HIP ARTHROPLASTY Left 2014     Family History   Problem Relation Age of Onset   • Alzheimer's disease Mother    • Colon cancer Neg Hx    • Colon polyps Neg Hx    • Crohn's disease Neg Hx    • Irritable bowel syndrome Neg Hx    • Ulcerative colitis Neg Hx      Social History     Tobacco Use   • Smoking status: Never   • Smokeless tobacco: Never   Vaping Use   • Vaping status: Never Used   Substance Use Topics   • Alcohol use: No   • Drug use: No     No current facility-administered medications on file prior to encounter.     Current Outpatient Medications on File Prior to Encounter   Medication Sig Dispense Refill   • acetaminophen (TYLENOL) 325 MG tablet Take 2 tablets by mouth Every 4 (Four) Hours As Needed for Mild Pain.     • Ascorbic Acid (VITAMIN C PO) Daily.     • atorvastatin (LIPITOR) 40 MG tablet      • bisacodyl (DULCOLAX) 10 MG suppository Insert 1 suppository into the rectum Daily As Needed for Constipation (Use if bisacodyl oral is ineffective).     • bisacodyl (DULCOLAX) 5 MG EC tablet Take 1 tablet by mouth Daily As Needed for Constipation (Use if polyethylene glycol is ineffective).     • calcium carbonate (TUMS) 500 MG chewable tablet Chew 2 tablets 2 (Two) Times a Day As Needed for Heartburn.     • cyclobenzaprine (FLEXERIL) 10 MG tablet TAKE 0.5   1 TABLET BY ORAL ROUTE AT BEDTIME AS NEEDED     • Effer-K 20 MEQ effervescent tablet      •  HYDROcodone-acetaminophen (NORCO) 5-325 MG per tablet      • lansoprazole (PREVACID SOLUTAB) 30 MG Tablet Delayed Release Dispersible disintegrating tablet Administer 1 tablet per G tube Every Morning.     • melatonin 3 MG tablet Take 1 tablet by mouth At Night As Needed for Sleep.     • Menthol-Zinc Oxide 0.44-20.6 % ointment Apply 1 Application topically to the appropriate area as directed Every 12 (Twelve) Hours.     • methotrexate 2.5 MG tablet Take 1 tablet by mouth 1 (One) Time Per Week. Take 2.5 mg on Wednesday and 2.5 mg on Thursday.  Do not take any medication on Friday through Tuesday.     • mupirocin (BACTROBAN) 2 % ointment Apply 1 Application topically to the appropriate area as directed Every 12 (Twelve) Hours. Apply to Penis Abrasion for 3 more days     • Omega-3 Fatty Acids (OMEGA 3 PO) Take 1 capsule by mouth Daily.     • potassium chloride (KAYCIEL) 20 mEq/15 mL solution Take 15 mL by mouth Daily.     • sennosides-docusate (PERICOLACE) 8.6-50 MG per tablet Take 2 tablets by mouth 2 (Two) Times a Day As Needed for Constipation.     • terazosin (HYTRIN) 1 MG capsule Administer 1 capsule per G tube Every Night.     • castor oil-balsam peru (VENELEX) ointment Apply 1 Application topically to the appropriate area as directed Every 12 (Twelve) Hours. Apply to foot, heel, and ankle wounds per wound care order.     • polyethylene glycol (MIRALAX) 17 g packet Take 17 g by mouth Daily As Needed (Use if senna-docusate is ineffective).     • VITAMIN D PO Daily.       No Known Allergies    Objective    Objective     Vital Signs  Temp:  [97.5 °F (36.4 °C)-98.2 °F (36.8 °C)] 98.2 °F (36.8 °C)  Heart Rate:  [72-78] 75  Resp:  [18-20] 20  BP: (113-124)/(53-68) 113/53  SpO2:  [100 %] 100 %  on   ;   Device (Oxygen Therapy): room air  Body mass index is 17.53 kg/m².    Physical Exam  General.  Elderly gentleman.  He is alert and oriented x 4.  In no apparent pain/distress/diaphoresis.  Normal mood and affect.  Eyes.   Pupils equal round and reactive.  Intact extraocular musculature.  No pallor or jaundice.  Positive discharge from the right eye.    Oral cavity.  Mucous membrane  Neck.  Stiff (old) no JVD.  No lymphadenopathy or thyromegaly.  Cardiovascular.  Regular rate and rhythm and grade 2 systolic murmur.  Chest.  Clear to auscultation bilaterally with no added sounds.  Abdomen.  Soft lax.  G-tube in place.  No organomegaly.  No guarding or rebound.  .  No CVA tenderness.  Mariscal catheter in place with clear urine.  CNS.  No acute focal neurological deficits.  Extremities.  No clubbing/cyanosis/edema.  Redness left foot    Results Review:  I reviewed the patient's new clinical results.  I reviewed the patient's new imaging results and agree with the interpretation.  I reviewed the patient's other test results and agree with the interpretation  I personally viewed and interpreted the patient's EKG/Telemetry data  Discussed with ED provider.    Lab Results (last 24 hours)       Procedure Component Value Units Date/Time    CBC & Differential [[id number]]  (Abnormal) Collected: 08/16/24 2115    Specimen: Blood Updated: 08/17/24 0157    Narrative:      The following orders were created for panel order CBC & Differential.  Procedure                               Abnormality         Status                     ---------                               -----------         ------                     CBC Auto Differential[[id number]]        Abnormal            Final result                 Please view results for these tests on the individual orders.    CBC Auto Differential [[id number]]  (Abnormal) Collected: 08/16/24 2115    Specimen: Blood Updated: 08/17/24 0157     WBC 9.35 10*3/mm3      RBC 2.57 10*6/mm3      Hemoglobin 6.7 g/dL      Hematocrit 21.6 %      MCV 84.0 fL      MCH 26.1 pg      MCHC 31.0 g/dL      RDW 14.8 %      RDW-SD 44.8 fl      MPV 8.6 fL      Platelets 470 10*3/mm3      Neutrophil % 69.0 %      Lymphocyte % 16.0 %       Monocyte % 12.2 %      Eosinophil % 2.2 %      Basophil % 0.4 %      Immature Grans % 0.2 %      Neutrophils, Absolute 6.44 10*3/mm3      Lymphocytes, Absolute 1.50 10*3/mm3      Monocytes, Absolute 1.14 10*3/mm3      Eosinophils, Absolute 0.21 10*3/mm3      Basophils, Absolute 0.04 10*3/mm3      Immature Grans, Absolute 0.02 10*3/mm3      nRBC 0.0 /100 WBC     POCT Occult Blood Stool [[id number]] Collected: 08/17/24 0504    Specimen: Stool from Per Rectum Updated: 08/17/24 0505     Fecal Occult Blood Negative     Lot Number [id number]     Expiration Date 2-     Positive Control Positive     Negative Control Negative    CBC & Differential [[id number]]  (Abnormal) Collected: 08/17/24 0522    Specimen: Blood Updated: 08/17/24 0534    Narrative:      The following orders were created for panel order CBC & Differential.  Procedure                               Abnormality         Status                     ---------                               -----------         ------                     CBC Auto Differential[[id number]]        Abnormal            Final result                 Please view results for these tests on the individual orders.    Comprehensive Metabolic Panel [[id number]]  (Abnormal) Collected: 08/17/24 0522    Specimen: Blood Updated: 08/17/24 0554     Glucose 97 mg/dL      BUN 12 mg/dL      Creatinine 0.54 mg/dL      Sodium 134 mmol/L      Potassium 4.1 mmol/L      Chloride 101 mmol/L      CO2 27.5 mmol/L      Calcium 8.9 mg/dL      Total Protein 8.2 g/dL      Albumin 2.6 g/dL      ALT (SGPT) 61 U/L      AST (SGOT) 62 U/L      Alkaline Phosphatase 85 U/L      Total Bilirubin 0.3 mg/dL      Globulin 5.6 gm/dL      A/G Ratio 0.5 g/dL      BUN/Creatinine Ratio 22.2     Anion Gap 5.5 mmol/L      eGFR 100.7 mL/min/1.73     Narrative:      GFR Normal >60  Chronic Kidney Disease <60  Kidney Failure <15    The GFR formula is only valid for adults with stable renal function between ages 18 and 70.    aPTT  [[id number]]  (Abnormal) Collected: 08/17/24 0522    Specimen: Blood Updated: 08/17/24 0652     PTT 36.1 seconds     Protime-INR [[id number]]  (Abnormal) Collected: 08/17/24 0522    Specimen: Blood Updated: 08/17/24 0652     Protime 18.6 Seconds      INR 1.52    CBC Auto Differential [[id number]]  (Abnormal) Collected: 08/17/24 0522    Specimen: Blood Updated: 08/17/24 0534     WBC 8.55 10*3/mm3      RBC 2.84 10*6/mm3      Hemoglobin 7.5 g/dL      Hematocrit 23.7 %      MCV 83.5 fL      MCH 26.4 pg      MCHC 31.6 g/dL      RDW 14.6 %      RDW-SD 43.9 fl      MPV 8.2 fL      Platelets 498 10*3/mm3      Neutrophil % 63.0 %      Lymphocyte % 20.1 %      Monocyte % 11.8 %      Eosinophil % 3.9 %      Basophil % 0.7 %      Immature Grans % 0.5 %      Neutrophils, Absolute 5.39 10*3/mm3      Lymphocytes, Absolute 1.72 10*3/mm3      Monocytes, Absolute 1.01 10*3/mm3      Eosinophils, Absolute 0.33 10*3/mm3      Basophils, Absolute 0.06 10*3/mm3      Immature Grans, Absolute 0.04 10*3/mm3      nRBC 0.0 /100 WBC     Iron Profile [[id number]]  (Abnormal) Collected: 08/17/24 0522    Specimen: Blood Updated: 08/17/24 0616     Iron 15 mcg/dL      Iron Saturation (TSAT) 8 %      Transferrin 121 mg/dL      TIBC 180 mcg/dL     Urinalysis With Culture If Indicated - Urine, Catheter [[id number]]  (Abnormal) Collected: 08/17/24 0536    Specimen: Urine, Catheter Updated: 08/17/24 0611     Color, UA Yellow     Appearance, UA Turbid     pH, UA 7.0     Specific Gravity, UA 1.018     Glucose, UA Negative     Ketones, UA Negative     Bilirubin, UA Negative     Blood, UA Small (1+)     Protein,  mg/dL (2+)     Leuk Esterase, UA Large (3+)     Nitrite, UA Positive     Urobilinogen, UA 1.0 E.U./dL    Narrative:      In absence of clinical symptoms, the presence of pyuria, bacteria, and/or nitrites on the urinalysis result does not correlate with infection.    Urinalysis, Microscopic Only - Urine, Catheter [[id number]]  (Abnormal) Collected:  08/17/24 0536    Specimen: Urine, Catheter Updated: 08/17/24 0704     RBC, UA 3-5 /HPF      WBC, UA Too Numerous to Count /HPF      Bacteria, UA 2+ /HPF      Squamous Epithelial Cells, UA None Seen /HPF      Hyaline Casts, UA None Seen /LPF      Methodology Manual Light Microscopy    Urine Culture - Urine, Urine, Catheter [[id number]] Collected: 08/17/24 0536    Specimen: Urine, Catheter Updated: 08/17/24 0704    CANDIDA AURIS SCREEN - Swab, Axilla Right, Axilla Left and Groin [[id number]] Collected: 08/17/24 1348    Specimen: Swab from Axilla Right, Axilla Left and Groin Updated: 08/17/24 1413            Imaging Results (Last 24 Hours)       ** No results found for the last 24 hours. **                Telemetry Scan   Final Result               Assessment/Plan     Active Hospital Problems    Diagnosis  POA   • **Acute on chronic anemia [D64.9]  Yes      Resolved Hospital Problems   No resolved problems to display.           Acute on chronic iron deficiency anemia.  Will fully workup the anemia.  Hemoglobin is worse than baseline.  Will check Hemoccult stool.  Will consult hematology oncology and GI.  Hold on transfusion unless hematology oncology believes it is needed.  Monitor H&H.  Continue proton pump inhibitor.  Elevated liver function test.  Benign GI examination.  Could be secondary to medication.  Will check CT scan of the abdomen and hepatitis panel.  Monitor liver function test.  Hypertension.  Good control on no medications.  No evidence of angina or congestive heart failure.  Will monitor.  Ankylosing spondylitis.  Continue methotrexate.  Elevated TSH recently.  Repeat full thyroid function test.  Not on any thyroid replacement.  Dysphagia/GERD/malnutrition.  Speech recommends puréed and honey thickened liquids.  Nutrition G-tube feeds.  Continue proton pump inhibitors.  Dyslipidemia.  Continue Lipitor.  Left foot wound.  Consult wound nurse.  VTE prophylaxis.  Sequential compression  device.      Discussed.  My findings and plan of treatment with the patient/family/ER provider.  Disposition.  Eventually back to skilled facility once medically stable.    Code Status -full code.       Luther Rojas MD  [facility]  08/17/24  14:43 EDT

## 2024-08-17 NOTE — PLAN OF CARE
Problem: Malnutrition  Goal: Improved Nutritional Intake  Outcome: Ongoing, Progressing     Problem: Oral Intake Inadequate  Goal: Improved Oral Intake  Outcome: Ongoing, Progressing   Goal Outcome Evaluation:      Recommend: speech eval  When MD [place]'s Gtube feeds--Recommend:  Nutren 2.0 at 10ml/hr and advance [id number] to goal of 45 ml/hr  Water flushes [id number]  Erich 1 pkg bolus BID via gtube for wound healing  RD to follow

## 2024-08-17 NOTE — THERAPY EVALUATION
Acute Care - Speech Language Pathology   Swallow Initial Evaluation [facility]     Patient Name: Anthony Gallegos  : 1945  MRN: [id number]  Today's Date: 2024               Admit Date: 2024    Visit Dx:     ICD-10-CM ICD-9-CM   1. Acute on chronic anemia  D64.9 285.9   2. Wound of sacral region, initial encounter  S31.000A 959.19   3. Chronic indwelling Mariscal catheter  Z97.8 V45.89   4. Iron deficiency  E61.1 280.9     Patient Active Problem List   Diagnosis    Ankylosing spondylitis of cervical region    Recent unexplained weight loss    Abnormal serum lipase level    Abnormal serum level of amylase    Hypertension    Boil    Immobility    Fall from bed    Tetrahydrocannabinol (THC) use disorder, mild, abuse    Generalized pain        Grief    DISH (disseminated idiopathic skeletal hyperostosis)    Generalized weakness    Severe malnutrition    Altered mental status    Transient alteration of awareness    GERD without esophagitis    BPH (benign prostatic hyperplasia)    Urinary tract infection associated with indwelling urethral catheter    Hyperglycemia    Decreased oral intake    Dysphagia    Failure to thrive in adult    Immunosuppression due to drug therapy    Renal failure    UTI (urinary tract infection), bacterial    Anemia    Acute on chronic anemia     Past Medical History:   Diagnosis Date    Abscess of scrotum     MARTITA (acute kidney injury)     Altered mental state     Arthritis     AS (ankylosing spondylitis)     History of MRSA infection     Hypertension     Leukocytosis     Tetrahydrocannabinol (THC) use disorder, mild, abuse 2023    Uveitis      Past Surgical History:   Procedure Laterality Date    COLONOSCOPY      ENDOSCOPY W/ PEG TUBE PLACEMENT N/A 3/29/2024    Procedure: ESOPHAGOGASTRODUODENOSCOPY WITH PERCUTANEOUS ENDOSCOPIC GASTROSTOMY TUBE INSERTION;  Surgeon: Khadar Broderick MD;  Location: [facility] ENDOSCOPY;  Service: General;  Laterality: N/A;  PRE/POST  - DYSPHAGIA    ROTATOR CUFF REPAIR Right     TOTAL HIP ARTHROPLASTY Left 2014       SLP Recommendation and Plan  SLP Swallowing Diagnosis: oral dysphagia, pharyngeal dysphagia (08/17/24 1300)  SLP Diet Recommendation: puree, honey thick liquids, long term alternate methods of nutrition/hydration (08/17/24 1300)  Recommended Precautions and Strategies: upright posture during/after eating, small bites of food and sips of liquid, 1:1 supervision, assist with feeding, multiple swallows per bite of food, multiple swallows per sip of liquid (08/17/24 1300)  SLP Rec. for Method of Medication Administration: meds via alternate route, meds crushed, with puree (08/17/24 1300)     Monitor for Signs of Aspiration: yes, notify SLP if any concerns (08/17/24 1300)  Recommended Diagnostics: reassess via clinical swallow evaluation (08/17/24 1300)  Swallow Criteria for Skilled Therapeutic Interventions Met: demonstrates skilled criteria (08/17/24 1300)     Rehab Potential/Prognosis, Swallowing: adequate, monitor progress closely (08/17/24 1300)  Therapy Frequency (Swallow): PRN (08/17/24 1300)  Predicted Duration Therapy Intervention (Days): until discharge (08/17/24 1300)  Oral Care Recommendations: Oral Care BID/PRN (08/17/24 1300)                                        Outcome Evaluation: Clinical swallow evaluation complete. Known to speech therapy care. Previous VFSS 6/2024 recommended puree/honey thick liquid diet (ice chips between meals). PEG since 3/2024. This date, pt reports he is motivated to eat/drink by mouth; requesting [employer]. Immediate throat clearing exhibited with ice chips. No overt s/s of aspiration with honey thick liquid via spoon/cup or x2/3 trials of puree. x1 delayed throat clear with puree. Recommend puree diet with honey thick liquids by cup. Meds via alternate route or crushed in puree. Sitting upright, slow rate, small bites/sips, supervision/feeding assist with meals. With negative lung changes, make NPO  and rely solely on PEG for nutrition. Speech to follow for diet tolerance.      SWALLOW EVALUATION (Last 72 Hours)       SLP Adult Swallow Evaluation       Row Name 08/17/24 1300                   Rehab Evaluation    Document Type evaluation  -CR        Subjective Information no complaints  -CR        Patient Observations alert;cooperative  -CR        Patient Effort good  -CR        Symptoms Noted During/After Treatment none  -CR           General Information    Patient Profile Reviewed yes  -CR        Pertinent History Of Current Problem 80 y/o male admitted from nursing facility d/t acute anemia and concern for debris in urinary catheter. Pt known to speech therapy care. Hx includes failure to thrive and moderate oropharyngeal dysphagia s/p PEG. Previous VFSS recommended puree/HTL diet.  -CR        Current Method of Nutrition no current diet order  -CR        Precautions/Limitations, Vision WFL;for purposes of eval  -CR        Precautions/Limitations, Hearing WFL;for purposes of eval  -CR        Prior Level of Function-Swallowing puree;honey thick liquids;alternative feeding method  -CR        Plans/Goals Discussed with patient;agreed upon  -CR        Barriers to Rehab previous functional deficit  -CR           Pain    Additional Documentation Pain Scale: Numbers Pre/Post-Treatment (Group)  -CR           Pain Scale: Numbers Pre/Post-Treatment    Pretreatment Pain Rating 8/10  -CR        Pain Location - back  buttock  -CR           Oral Motor Structure and Function    Dentition Assessment upper dentures/partial in place  -CR        Secretion Management WNL/WFL  -CR        Mucosal Quality sticky  -CR           Oral Musculature and Cranial Nerve Assessment    Oral Motor General Assessment generalized oral motor weakness  -CR           Clinical Swallow Eval    Clinical Swallow Evaluation Summary Clinical swallow evaluation complete. Known to speech therapy care. Previous VFSS 6/2024 recommended puree/honey thick  liquid diet (ice chips between meals). PEG since 3/2024. This date, pt reports he is motivated to eat/drink by mouth; requesting [facility]. Immediate throat clearing exhibited with ice chips. No overt s/s of aspiration with honey thick liquid via spoon/cup or x2/3 trials of puree. x1 delayed throat clear with puree. Recommend puree diet with honey thick liquids by cup. Meds via alternate route or crushed in puree. Sitting upright, slow rate, small bites/sips, supervision/feeding assist with meals. With negative lung changes, make NPO and rely solely on PEG for nutrition. Speech to follow for diet tolerance.  -CR           SLP Evaluation Clinical Impression    SLP Swallowing Diagnosis oral dysphagia;pharyngeal dysphagia  -CR        Functional Impact risk of aspiration/pneumonia  -CR        Rehab Potential/Prognosis, Swallowing adequate, monitor progress closely  -CR        Swallow Criteria for Skilled Therapeutic Interventions Met demonstrates skilled criteria  -CR           Recommendations    Therapy Frequency (Swallow) PRN  -CR        Predicted Duration Therapy Intervention (Days) until discharge  -CR        SLP Diet Recommendation puree;honey thick liquids;long term alternate methods of nutrition/hydration  -CR        Recommended Diagnostics reassess via clinical swallow evaluation  -CR        Recommended Precautions and Strategies upright posture during/after eating;small bites of food and sips of liquid;1:1 supervision;assist with feeding;multiple swallows per bite of food;multiple swallows per sip of liquid  -CR        Oral Care Recommendations Oral Care BID/PRN  -CR        SLP Rec. for Method of Medication Administration meds via alternate route;meds crushed;with puree  -CR        Monitor for Signs of Aspiration yes;notify SLP if any concerns  -CR           Swallow Goals (SLP)    Swallow STGs diet tolerance goal selection (SLP)  -CR        Diet Tolerance Goal Selection (SLP) Patient will tolerate trials of  -CR            (STG) Patient will tolerate trials of    Consistencies Trialed (Tolerate trials) pureed textures;thin liquids  -CR        Desired Outcome (Tolerate trials) without signs/symptoms of aspiration  -CR        [place] (Tolerate trials) with minimal cues (75-90% accuracy)  -CR        Time Frame (Tolerate trials) by discharge  -CR        Progress/Outcomes (Tolerate trials) new goal  -CR                  User Key  (r) = Recorded By, (t) = Taken By, (c) = Cosigned By      Initials Name Effective Dates    Linda Laurent SLP 08/28/23 -                     EDUCATION  The patient has been educated in the following areas:   Dysphagia (Swallowing Impairment).        SLP GOALS       Row Name 08/17/24 1300             (STG) Patient will tolerate trials of    Consistencies Trialed (Tolerate trials) pureed textures;thin liquids  -CR      Desired Outcome (Tolerate trials) without signs/symptoms of aspiration  -CR      [place] (Tolerate trials) with minimal cues (75-90% accuracy)  -CR      Time Frame (Tolerate trials) by discharge  -CR      Progress/Outcomes (Tolerate trials) new goal  -CR                User Key  (r) = Recorded By, (t) = Taken By, (c) = Cosigned By      Initials Name Provider Type    Linda Laurent SLP Speech and Language Pathologist                         Time Calculation:    Time Calculation- SLP       Row Name 08/17/24 1423             Time Calculation- SLP    SLP Start Time 1100  -CR      SLP Received On 08/17/24  -CR         Untimed Charges    [id number] Eval Oral Pharyng Swallow Minutes 45  -CR         Total Minutes    Untimed Charges Total Minutes 45  -CR       Total Minutes 45  -CR                User Key  (r) = Recorded By, (t) = Taken By, (c) = Cosigned By      Initials Name Provider Type    Linda Laurent SLP Speech and Language Pathologist                    Therapy Charges for Today       Code Description Service Date Service Provider Modifiers Qty    [id number]   ST EVAL ORAL PHARYNG SWALLOW 3 8/17/2024 Linda Oliver, SLP GN 1                 BRANDON Tang  8/17/2024

## 2024-08-17 NOTE — ED PROVIDER NOTES
EMERGENCY DEPARTMENT ENCOUNTER  Room Number:  09/09  PCP: Keshav Eason MD  Independent Historians: Patient and EMS report      HPI:  Chief Complaint: had concerns including Abnormal Lab and Urinary Tract Infection.     A complete HPI/ROS/PMH/PSH/SH/FH are unobtainable due to: None    Chronic or social conditions impacting patient care (Social Determinants of Health): None      Context: Anthony Gallegos is a 79 y.o. male with a medical history of ankylosing spondylitis, hypertension, and BPH with chronically indwelling Mariscal who presents to the ED c/o acute anemia and concern for debris in urinary catheter.  Patient complains of unchanged chronic back pain.  No clear exacerbating relieving factors identified.  Patient also complains of being cold.    We reviewed paperwork that accompanies the patient reveals that he had a hemoglobin greater than 8 two days ago and then it dropped to mid 6 yesterday.  Patient unaware of any black or bloody stools.    Review of prior external notes (non-ED) -and- Review of prior external test results outside of this encounter:  Hospital discharge summary 6/27/2024 reviewed: Patient admitted treated for ESBL E. coli UTI and bacteremia      PAST MEDICAL HISTORY  Active Ambulatory Problems     Diagnosis Date Noted    Ankylosing spondylitis of cervical region 06/29/2017    Recent unexplained weight loss 07/14/2017    Abnormal serum lipase level 08/10/2017    Abnormal serum level of amylase 08/10/2017    Hypertension 10/19/2017    Boil 03/22/2018    Immobility 12/20/2023    Fall from bed 12/20/2023    Tetrahydrocannabinol (THC) use disorder, mild, abuse 12/20/2023    Generalized pain 12/20/2023     12/20/2023    Grief 12/20/2023    DISH (disseminated idiopathic skeletal hyperostosis) 12/20/2023    Generalized weakness 12/21/2023    Severe malnutrition 12/24/2023    Altered mental status 01/21/2024    Transient alteration of awareness 01/22/2024    GERD without esophagitis  01/22/2024    BPH (benign prostatic hyperplasia) 01/22/2024    Urinary tract infection associated with indwelling urethral catheter 01/22/2024    Hyperglycemia 01/22/2024    Decreased oral intake 03/25/2024    Dysphagia 03/25/2024    Failure to thrive in adult 04/09/2024    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    Renal failure 06/18/2024    UTI (urinary tract infection), bacterial 06/18/2024    Anemia 06/27/2024     Resolved Ambulatory Problems     Diagnosis Date Noted    Urine retention 12/20/2023    Constipation 12/20/2023    Leukocytosis 01/22/2024    Dehydration 01/22/2024    MARTITA (acute kidney injury) 01/22/2024    Altered mental state 01/22/2024    Encephalopathy 03/26/2024    ARF (acute renal failure) 06/18/2024     Past Medical History:   Diagnosis Date    Abscess of scrotum     Arthritis     AS (ankylosing spondylitis)     History of MRSA infection     Uveitis          PAST SURGICAL HISTORY  Past Surgical History:   Procedure Laterality Date    COLONOSCOPY      ENDOSCOPY W/ PEG TUBE PLACEMENT N/A 3/29/2024    Procedure: ESOPHAGOGASTRODUODENOSCOPY WITH PERCUTANEOUS ENDOSCOPIC GASTROSTOMY TUBE INSERTION;  Surgeon: Khadar Broderick MD;  Location: [facility] ENDOSCOPY;  Service: General;  Laterality: N/A;  PRE/POST - DYSPHAGIA    ROTATOR CUFF REPAIR Right     TOTAL HIP ARTHROPLASTY Left 2014         FAMILY HISTORY  Family History   Problem Relation Age of Onset    Alzheimer's disease Mother     Colon cancer Neg Hx     Colon polyps Neg Hx     Crohn's disease Neg Hx     Irritable bowel syndrome Neg Hx     Ulcerative colitis Neg Hx          SOCIAL HISTORY  Social History     Socioeconomic History    Marital status:     Number of children: 2   Tobacco Use    Smoking status: Never    Smokeless tobacco: Never   Vaping Use    Vaping status: Never Used   Substance and Sexual Activity    Alcohol use: No    Drug use: No    Sexual activity: Defer         ALLERGIES  Patient has no known  allergies.      REVIEW OF SYSTEMS  Review of Systems  Included in HPI  All systems reviewed and negative except for those discussed in HPI.      PHYSICAL EXAM    I have reviewed the triage vital signs and nursing notes.    ED Triage Vitals [08/17/24 0438]   Temp Heart Rate Resp BP SpO2   97.5 °F (36.4 °C) 78 18 124/68 100 %      Temp src Heart Rate Source Patient Position BP Location FiO2 (%)   -- -- -- -- --       Physical Exam    Physical Exam   Constitutional: No distress.  Nontoxic but thin and frail appearing  HENT:  Head: Normocephalic and atraumatic.   Oropharynx: Mucous membranes are moist.   Eyes: . No scleral icterus. No conjunctival pallor.  Neck: Normal range of motion. Neck supple.   Cardiovascular: Pink warm and well perfused throughout.    Pulmonary/Chest: No respiratory distress.  No tachypnea or increased work of breathing appreciated.    Abdominal: Soft. There is no tenderness. There is no rebound and no guarding.   Back: Atraumatic thoracic and lumbar spine.  Strong smelling sacral wound with dressing in place with some blood.  MAXI: Formed stool in vault.  Hemoccult negative  Musculoskeletal: Moves all extremities equally.    Neurological: Alert and oriented.  No acute focal deficit appreciated.  Skin: Skin is pink, warm, and dry.   Psychiatric: Mood and affect normal.   Nursing note and vitals reviewed.             LAB RESULTS  Recent Results (from the past 24 hour(s))   CBC Auto Differential    Collection Time: 08/16/24  9:15 PM    Specimen: Blood   Result Value Ref Range    WBC 9.35 3.40 - 10.80 10*3/mm3    RBC 2.57 (L) 4.14 - 5.80 10*6/mm3    Hemoglobin 6.7 (C) 13.0 - 17.7 g/dL    Hematocrit 21.6 (L) 37.5 - 51.0 %    MCV 84.0 79.0 - 97.0 fL    MCH 26.1 (L) 26.6 - 33.0 pg    MCHC 31.0 (L) 31.5 - 35.7 g/dL    RDW 14.8 12.3 - 15.4 %    RDW-SD 44.8 37.0 - 54.0 fl    MPV 8.6 6.0 - 12.0 fL    Platelets 470 (H) 140 - 450 10*3/mm3    Neutrophil % 69.0 42.7 - 76.0 %    Lymphocyte % 16.0 (L) 19.6 - 45.3  %    Monocyte % 12.2 (H) 5.0 - 12.0 %    Eosinophil % 2.2 0.3 - 6.2 %    Basophil % 0.4 0.0 - 1.5 %    Immature Grans % 0.2 0.0 - 0.5 %    Neutrophils, Absolute 6.44 1.70 - 7.00 10*3/mm3    Lymphocytes, Absolute 1.50 0.70 - 3.10 10*3/mm3    Monocytes, Absolute 1.14 (H) 0.10 - 0.90 10*3/mm3    Eosinophils, Absolute 0.21 0.00 - 0.40 10*3/mm3    Basophils, Absolute 0.04 0.00 - 0.20 10*3/mm3    Immature Grans, Absolute 0.02 0.00 - 0.05 10*3/mm3    nRBC 0.0 0.0 - 0.2 /100 WBC   POCT Occult Blood Stool    Collection Time: 08/17/24  5:04 AM    Specimen: Per Rectum; Stool   Result Value Ref Range    Fecal Occult Blood Negative Negative    Lot Number [id number]     Expiration Date 2-     Positive Control Positive Positive    Negative Control Negative Negative   Comprehensive Metabolic Panel    Collection Time: 08/17/24  5:22 AM    Specimen: Blood   Result Value Ref Range    Glucose 97 65 - 99 mg/dL    BUN 12 8 - 23 mg/dL    Creatinine 0.54 (L) 0.76 - 1.27 mg/dL    Sodium 134 (L) 136 - 145 mmol/L    Potassium 4.1 3.5 - 5.2 mmol/L    Chloride 101 98 - 107 mmol/L    CO2 27.5 22.0 - 29.0 mmol/L    Calcium 8.9 8.6 - 10.5 mg/dL    Total Protein 8.2 6.0 - 8.5 g/dL    Albumin 2.6 (L) 3.5 - 5.2 g/dL    ALT (SGPT) 61 (H) 1 - 41 U/L    AST (SGOT) 62 (H) 1 - 40 U/L    Alkaline Phosphatase 85 39 - 117 U/L    Total Bilirubin 0.3 0.0 - 1.2 mg/dL    Globulin 5.6 gm/dL    A/G Ratio 0.5 g/dL    BUN/Creatinine Ratio 22.2 7.0 - 25.0    Anion Gap 5.5 5.0 - 15.0 mmol/L    eGFR 100.7 >60.0 mL/min/1.73   Type & Screen    Collection Time: 08/17/24  5:22 AM    Specimen: Blood   Result Value Ref Range    ABO Type O     RH type Positive     Antibody Screen Negative     T&S Expiration Date 8/20/2024 11:59:59 PM    CBC Auto Differential    Collection Time: 08/17/24  5:22 AM    Specimen: Blood   Result Value Ref Range    WBC 8.55 3.40 - 10.80 10*3/mm3    RBC 2.84 (L) 4.14 - 5.80 10*6/mm3    Hemoglobin 7.5 (L) 13.0 - 17.7 g/dL    Hematocrit 23.7 (L) 37.5 -  51.0 %    MCV 83.5 79.0 - 97.0 fL    MCH 26.4 (L) 26.6 - 33.0 pg    MCHC 31.6 31.5 - 35.7 g/dL    RDW 14.6 12.3 - 15.4 %    RDW-SD 43.9 37.0 - 54.0 fl    MPV 8.2 6.0 - 12.0 fL    Platelets 498 (H) 140 - 450 10*3/mm3    Neutrophil % 63.0 42.7 - 76.0 %    Lymphocyte % 20.1 19.6 - 45.3 %    Monocyte % 11.8 5.0 - 12.0 %    Eosinophil % 3.9 0.3 - 6.2 %    Basophil % 0.7 0.0 - 1.5 %    Immature Grans % 0.5 0.0 - 0.5 %    Neutrophils, Absolute 5.39 1.70 - 7.00 10*3/mm3    Lymphocytes, Absolute 1.72 0.70 - 3.10 10*3/mm3    Monocytes, Absolute 1.01 (H) 0.10 - 0.90 10*3/mm3    Eosinophils, Absolute 0.33 0.00 - 0.40 10*3/mm3    Basophils, Absolute 0.06 0.00 - 0.20 10*3/mm3    Immature Grans, Absolute 0.04 0.00 - 0.05 10*3/mm3    nRBC 0.0 0.0 - 0.2 /100 WBC   Iron Profile    Collection Time: 08/17/24  5:22 AM    Specimen: Blood   Result Value Ref Range    Iron 15 (L) 59 - 158 mcg/dL    Iron Saturation (TSAT) 8 (L) 20 - 50 %    Transferrin 121 (L) 200 - 360 mg/dL    TIBC 180 (L) 298 - 536 mcg/dL   Urinalysis With Culture If Indicated - Urine, Catheter    Collection Time: 08/17/24  5:36 AM    Specimen: Urine, Catheter   Result Value Ref Range    Color, UA Yellow Yellow, Straw    Appearance, UA Turbid (A) Clear    pH, UA 7.0 5.0 - 8.0    Specific Gravity, UA 1.018 1.005 - 1.030    Glucose, UA Negative Negative    Ketones, UA Negative Negative    Bilirubin, UA Negative Negative    Blood, UA Small (1+) (A) Negative    Protein,  mg/dL (2+) (A) Negative    Leuk Esterase, UA Large (3+) (A) Negative    Nitrite, UA Positive (A) Negative    Urobilinogen, UA 1.0 E.U./dL 0.2 - 1.0 E.U./dL         RADIOLOGY  No Radiology Exams Resulted Within Past 24 Hours      MEDICATIONS GIVEN IN ER  Medications   sodium chloride 0.9 % flush 10 mL (has no administration in time range)   fentaNYL citrate (PF) (SUBLIMAZE) injection 50 mcg (50 mcg Intravenous Given 8/17/24 0531)         ORDERS PLACED DURING THIS VISIT:  Orders Placed This Encounter    Procedures    Comprehensive Metabolic Panel    Urinalysis With Culture If Indicated - Urine, Catheter    aPTT    Protime-INR    CBC Auto Differential    Iron Profile    Urinalysis, Microscopic Only - Urine, Clean Catch    Monitor Blood Pressure    Continuous Pulse Oximetry    [facility]HA (on-call MD unless specified) Details    POCT Occult Blood Stool    Type & Screen    Insert Peripheral IV    Initiate Observation Status    CBC & Differential         OUTPATIENT MEDICATION MANAGEMENT:  Current Facility-Administered Medications Ordered in Epic   Medication Dose Route Frequency Provider Last Rate Last Admin    sodium chloride 0.9 % flush 10 mL  10 mL Intravenous PRN Tom Noel MD         Current Outpatient Medications Ordered in Epic   Medication Sig Dispense Refill    acetaminophen (TYLENOL) 325 MG tablet Take 2 tablets by mouth Every 4 (Four) Hours As Needed for Mild Pain.      Ascorbic Acid (VITAMIN C PO) Daily.      atorvastatin (LIPITOR) 40 MG tablet       bisacodyl (DULCOLAX) 10 MG suppository Insert 1 suppository into the rectum Daily As Needed for Constipation (Use if bisacodyl oral is ineffective).      bisacodyl (DULCOLAX) 5 MG EC tablet Take 1 tablet by mouth Daily As Needed for Constipation (Use if polyethylene glycol is ineffective).      calcium carbonate (TUMS) 500 MG chewable tablet Chew 2 tablets 2 (Two) Times a Day As Needed for Heartburn.      castor oil-balsam peru (VENELEX) ointment Apply 1 Application topically to the appropriate area as directed Every 12 (Twelve) Hours. Apply to foot, heel, and ankle wounds per wound care order.      cyclobenzaprine (FLEXERIL) 10 MG tablet TAKE 0.5   1 TABLET BY ORAL ROUTE AT BEDTIME AS NEEDED      Effer-K 20 MEQ effervescent tablet       HYDROcodone-acetaminophen (NORCO) 5-325 MG per tablet       lansoprazole (PREVACID SOLUTAB) 30 MG Tablet Delayed Release Dispersible disintegrating tablet Administer 1 tablet per G tube Every Morning.      melatonin 3 MG tablet  Take 1 tablet by mouth At Night As Needed for Sleep.      Menthol-Zinc Oxide 0.44-20.6 % ointment Apply 1 Application topically to the appropriate area as directed Every 12 (Twelve) Hours.      methotrexate 2.5 MG tablet Take 1 tablet by mouth 1 (One) Time Per Week. Take 2.5 mg on Wednesday and 2.5 mg on Thursday.  Do not take any medication on Friday through Tuesday.      mupirocin (BACTROBAN) 2 % ointment Apply 1 Application topically to the appropriate area as directed Every 12 (Twelve) Hours. Apply to Penis Abrasion for 3 more days      Omega-3 Fatty Acids (OMEGA 3 PO) Take 1 capsule by mouth Daily.      polyethylene glycol (MIRALAX) 17 g packet Take 17 g by mouth Daily As Needed (Use if senna-docusate is ineffective).      potassium chloride (KAYCIEL) 20 mEq/15 mL solution Take 15 mL by mouth Daily.      sennosides-docusate (PERICOLACE) 8.6-50 MG per tablet Take 2 tablets by mouth 2 (Two) Times a Day As Needed for Constipation.      terazosin (HYTRIN) 1 MG capsule Administer 1 capsule per G tube Every Night.      VITAMIN D PO Daily.           PROCEDURES  Procedures            PROGRESS, DATA ANALYSIS, CONSULTS, AND MEDICAL DECISION MAKING  All labs have been independently interpreted by me.  All radiology studies have been reviewed by me. All EKG's have been independently viewed and interpreted by me.  Discussion below represents my analysis of pertinent findings related to patient's condition, differential diagnosis, treatment plan and final disposition.    Differential diagnosis:   My differential diagnosis includes but is not limited to generalized weakness, electrolyte abnormality, CVA, TIA, Bell's palsy, acute MI, GI bleed, urinary tract infection, systemic infections including sepsis, alcohol abuse, drug abuse including prescription and street drug.      Clinical Scores:                  ED Course as of 08/17/24 0622   Sat Aug 17, 2024   0548 WBC: 8.55 [RS]   0548 Hemoglobin(!): 7.5 [RS]   0548  Platelets(!): 498 [RS]   0559 Glucose: 97 [RS]   0600 BUN: 12 [RS]   0600 Creatinine(!): 0.54 [RS]   0600 Sodium(!): 134 [RS]   0600 Potassium: 4.1 [RS]   0600 Albumin(!): 2.6 [RS]   0600 Total Bilirubin: 0.3 [RS]   0621 CONSULT        Provider: Dr. Rojas - [facility]    Discussion: Reviewed patient history, ED presentation and evaluation as well as considerations for transfusion of packed red cells versus iron infusion.  He is agreeable to accept the patient for admission and will work through that problem as inpatient    Agreeable c treatment and planned disposition.         [RS]      ED Course User Index  [RS] Tom Noel MD         Prescription drug monitoring program review:     AS OF 06:22 EDT VITALS:    BP - 124/68  HR - 78  TEMP - 97.5 °F (36.4 °C)  O2 SATS - 100%    COMPLEXITY OF CARE  The patient requires admission.      DIAGNOSIS  Final diagnoses:   Acute on chronic anemia   Wound of sacral region, initial encounter   Chronic indwelling Mariscal catheter   Iron deficiency         DISPOSITION  ED Disposition       ED Disposition   Decision to Admit    Condition   --    Comment   Level of Care: Telemetry [5]   Diagnosis: Acute on chronic anemia [[id number]]   Admitting Physician: MARCELLUS ROJAS [[id number]]                    ADMISSION    Discussed treatment plan and reason for admission with pt/family and admitting physician.  Pt/family voiced understanding of the plan for admission for further testing/treatment as needed.       Please note that portions of this document were completed with a voice recognition program.    Note Disclaimer: At [facility], we believe that sharing information builds trust and better relationships. You are receiving this note because you recently visited [facility]. It is possible you will see health information before a provider has talked with you about it. This kind of information can be easy to misunderstand. To help you fully understand what it means for your health, we urge  you to discuss this note with your provider.         Tom Noel MD  08/17/24 0622

## 2024-08-17 NOTE — PLAN OF CARE
Goal Outcome Evaluation:  Plan of Care Reviewed With: patient              Pt from LTC facility, A& O x 2-3, RA and SR on the monitor. Pt to start nocturnal tube feeds tonight. Wounds documents and dressed, wound to see pt. Q2 turns performed. No complaints of pain noted this shift.

## 2024-08-18 ENCOUNTER — APPOINTMENT (OUTPATIENT)
Dept: CT IMAGING | Facility: HOSPITAL | Age: 79
DRG: 264 | End: 2024-08-18
Payer: MEDICARE

## 2024-08-18 PROBLEM — E03.9 HYPOTHYROIDISM (ACQUIRED): Status: ACTIVE | Noted: 2024-08-18

## 2024-08-18 LAB
ALBUMIN SERPL-MCNC: 2.6 G/DL (ref 3.5–5.2)
ALBUMIN/GLOB SERPL: 0.5 G/DL
ALP SERPL-CCNC: 88 U/L (ref 39–117)
ALT SERPL W P-5'-P-CCNC: 45 U/L (ref 1–41)
ANION GAP SERPL CALCULATED.3IONS-SCNC: 7 MMOL/L (ref 5–15)
AST SERPL-CCNC: 44 U/L (ref 1–40)
BASOPHILS # BLD AUTO: 0.06 10*3/MM3 (ref 0–0.2)
BASOPHILS NFR BLD AUTO: 0.7 % (ref 0–1.5)
BILIRUB SERPL-MCNC: 0.3 MG/DL (ref 0–1.2)
BUN SERPL-MCNC: 14 MG/DL (ref 8–23)
BUN/CREAT SERPL: 25.5 (ref 7–25)
CALCIUM SPEC-SCNC: 8.7 MG/DL (ref 8.6–10.5)
CHLORIDE SERPL-SCNC: 99 MMOL/L (ref 98–107)
CO2 SERPL-SCNC: 26 MMOL/L (ref 22–29)
CREAT SERPL-MCNC: 0.55 MG/DL (ref 0.76–1.27)
DEPRECATED RDW RBC AUTO: 43.2 FL (ref 37–54)
EGFRCR SERPLBLD CKD-EPI 2021: 100.2 ML/MIN/1.73
EOSINOPHIL # BLD AUTO: 0.18 10*3/MM3 (ref 0–0.4)
EOSINOPHIL NFR BLD AUTO: 2 % (ref 0.3–6.2)
ERYTHROCYTE [DISTWIDTH] IN BLOOD BY AUTOMATED COUNT: 14.4 % (ref 12.3–15.4)
GLOBULIN UR ELPH-MCNC: 5.4 GM/DL
GLUCOSE BLDC GLUCOMTR-MCNC: 124 MG/DL (ref 70–130)
GLUCOSE BLDC GLUCOMTR-MCNC: 139 MG/DL (ref 70–130)
GLUCOSE SERPL-MCNC: 94 MG/DL (ref 65–99)
HCT VFR BLD AUTO: 22.6 % (ref 37.5–51)
HCT VFR BLD AUTO: 23.3 % (ref 37.5–51)
HCT VFR BLD AUTO: 23.7 % (ref 37.5–51)
HCT VFR BLD AUTO: 23.7 % (ref 37.5–51)
HGB BLD-MCNC: 7 G/DL (ref 13–17.7)
HGB BLD-MCNC: 7.1 G/DL (ref 13–17.7)
HGB BLD-MCNC: 7.3 G/DL (ref 13–17.7)
HGB BLD-MCNC: 7.3 G/DL (ref 13–17.7)
IMM GRANULOCYTES # BLD AUTO: 0.04 10*3/MM3 (ref 0–0.05)
IMM GRANULOCYTES NFR BLD AUTO: 0.5 % (ref 0–0.5)
LYMPHOCYTES # BLD AUTO: 1.19 10*3/MM3 (ref 0.7–3.1)
LYMPHOCYTES NFR BLD AUTO: 13.5 % (ref 19.6–45.3)
MCH RBC QN AUTO: 25.6 PG (ref 26.6–33)
MCHC RBC AUTO-ENTMCNC: 30.8 G/DL (ref 31.5–35.7)
MCV RBC AUTO: 83.2 FL (ref 79–97)
MONOCYTES # BLD AUTO: 0.91 10*3/MM3 (ref 0.1–0.9)
MONOCYTES NFR BLD AUTO: 10.4 % (ref 5–12)
NEUTROPHILS NFR BLD AUTO: 6.41 10*3/MM3 (ref 1.7–7)
NEUTROPHILS NFR BLD AUTO: 72.9 % (ref 42.7–76)
NRBC BLD AUTO-RTO: 0 /100 WBC (ref 0–0.2)
PLATELET # BLD AUTO: 519 10*3/MM3 (ref 140–450)
PMV BLD AUTO: 8 FL (ref 6–12)
POTASSIUM SERPL-SCNC: 3.7 MMOL/L (ref 3.5–5.2)
PROT SERPL-MCNC: 8 G/DL (ref 6–8.5)
RBC # BLD AUTO: 2.85 10*6/MM3 (ref 4.14–5.8)
SODIUM SERPL-SCNC: 132 MMOL/L (ref 136–145)
WBC NRBC COR # BLD AUTO: 8.79 10*3/MM3 (ref 3.4–10.8)

## 2024-08-18 PROCEDURE — 85018 HEMOGLOBIN: CPT | Performed by: INTERNAL MEDICINE

## 2024-08-18 PROCEDURE — 25010000002 CEFTRIAXONE PER 250 MG: Performed by: INTERNAL MEDICINE

## 2024-08-18 PROCEDURE — 99222 1ST HOSP IP/OBS MODERATE 55: CPT | Performed by: INTERNAL MEDICINE

## 2024-08-18 PROCEDURE — 85025 COMPLETE CBC W/AUTO DIFF WBC: CPT | Performed by: INTERNAL MEDICINE

## 2024-08-18 PROCEDURE — G0378 HOSPITAL OBSERVATION PER HR: HCPCS

## 2024-08-18 PROCEDURE — 99214 OFFICE O/P EST MOD 30 MIN: CPT | Performed by: INTERNAL MEDICINE

## 2024-08-18 PROCEDURE — 85014 HEMATOCRIT: CPT | Performed by: INTERNAL MEDICINE

## 2024-08-18 PROCEDURE — 82948 REAGENT STRIP/BLOOD GLUCOSE: CPT

## 2024-08-18 PROCEDURE — 82272 OCCULT BLD FECES 1-3 TESTS: CPT | Performed by: INTERNAL MEDICINE

## 2024-08-18 PROCEDURE — 80053 COMPREHEN METABOLIC PANEL: CPT | Performed by: INTERNAL MEDICINE

## 2024-08-18 RX ORDER — POTASSIUM CHLORIDE 1.5 G/1.58G
20 POWDER, FOR SOLUTION ORAL DAILY
Status: DISCONTINUED | OUTPATIENT
Start: 2024-08-18 | End: 2024-08-28 | Stop reason: HOSPADM

## 2024-08-18 RX ORDER — LEVOTHYROXINE SODIUM 25 UG/1
25 TABLET ORAL
Status: DISCONTINUED | OUTPATIENT
Start: 2024-08-18 | End: 2024-08-28 | Stop reason: HOSPADM

## 2024-08-18 RX ADMIN — Medication 10 ML: at 20:29

## 2024-08-18 RX ADMIN — CIPROFLOXACIN 2 DROP: 3 SOLUTION OPHTHALMIC at 11:14

## 2024-08-18 RX ADMIN — SENNOSIDES AND DOCUSATE SODIUM 2 TABLET: 50; 8.6 TABLET ORAL at 08:54

## 2024-08-18 RX ADMIN — LANSOPRAZOLE 30 MG: 15 TABLET, ORALLY DISINTEGRATING ORAL at 06:15

## 2024-08-18 RX ADMIN — CIPROFLOXACIN 2 DROP: 3 SOLUTION OPHTHALMIC at 08:55

## 2024-08-18 RX ADMIN — CIPROFLOXACIN 2 DROP: 3 SOLUTION OPHTHALMIC at 04:41

## 2024-08-18 RX ADMIN — SENNOSIDES AND DOCUSATE SODIUM 2 TABLET: 50; 8.6 TABLET ORAL at 20:29

## 2024-08-18 RX ADMIN — LEVOTHYROXINE SODIUM 25 MCG: 25 TABLET ORAL at 11:13

## 2024-08-18 RX ADMIN — CIPROFLOXACIN 2 DROP: 3 SOLUTION OPHTHALMIC at 20:18

## 2024-08-18 RX ADMIN — CEFTRIAXONE 2000 MG: 2 INJECTION, POWDER, FOR SOLUTION INTRAMUSCULAR; INTRAVENOUS at 15:05

## 2024-08-18 RX ADMIN — Medication 10 ML: at 08:55

## 2024-08-18 RX ADMIN — ATORVASTATIN CALCIUM 40 MG: 20 TABLET, FILM COATED ORAL at 08:55

## 2024-08-18 RX ADMIN — CIPROFLOXACIN 2 DROP: 3 SOLUTION OPHTHALMIC at 15:05

## 2024-08-18 RX ADMIN — POTASSIUM CHLORIDE 20 MEQ: 1.5 FOR SOLUTION ORAL at 11:13

## 2024-08-18 NOTE — PLAN OF CARE
Goal Outcome Evaluation:  Plan of Care Reviewed With: patient        Progress: no change  Outcome Evaluation: Pt alert to self, on RA. Tube feeding started this shift, advance to 30cc/hr. Mariscal in place, catheter care provided. Q2 turns. H& H q8 hrs. Need stool sample. Wound care provided. VSS.

## 2024-08-18 NOTE — CONSULTS
[facility] CBC GROUP INITIAL INPATIENT CONSULTATION NOTE    REASON FOR CONSULTATION: Anemia    HISTORY OF PRESENT ILLNESS:  Anthony Gallegos is a 79 y.o. male who we are asked to see today in consultation for anemia    The patient has a past medical history of ankylosing spondylitis, hypertension, BPH with an indwelling Mariscal catheter, dysphagia, malnutrition, G-tube placement.    The patient presented with a hemoglobin of 6.7 at his nursing home.       He was seen by Dr. Quarles for initial consultation in the office on 8/12/2024.  Ferritin was high, iron low.  His plan was to initiate Procrit every couple of weeks and he has a follow-up appointment scheduled next month.  He has not yet received Procrit.    Hemoglobin has stabilized.  No obvious bleeding.  GI has evaluated.    Patient has no complaints.    Past Medical History:   Diagnosis Date    Abscess of scrotum     MARTITA (acute kidney injury)     Altered mental state     Arthritis     AS (ankylosing spondylitis)     History of MRSA infection     Hypertension     Leukocytosis     Tetrahydrocannabinol (THC) use disorder, mild, abuse 12/20/2023    Uveitis        Past Surgical History:   Procedure Laterality Date    COLONOSCOPY      ENDOSCOPY W/ PEG TUBE PLACEMENT N/A 3/29/2024    Procedure: ESOPHAGOGASTRODUODENOSCOPY WITH PERCUTANEOUS ENDOSCOPIC GASTROSTOMY TUBE INSERTION;  Surgeon: Khadar Broderick MD;  Location: [facility] ENDOSCOPY;  Service: General;  Laterality: N/A;  PRE/POST - DYSPHAGIA    ROTATOR CUFF REPAIR Right     TOTAL HIP ARTHROPLASTY Left 2014       SOCIAL HISTORY:   reports that he has never smoked. He has never used smokeless tobacco. He reports that he does not drink alcohol and does not use drugs.    FAMILY HISTORY:  family history includes Alzheimer's disease in his mother.    ALLERGIES:  No Known Allergies    MEDICATIONS:  As listed in the electronic medical record.    Review of Systems   Constitutional:  Positive for fatigue.    Neurological:  Positive for weakness.   All other systems reviewed and are negative.      Vitals:    08/17/24 2000 08/17/24 2029 08/17/24 2349 08/18/24 0734   BP: 103/52 106/51 124/67 105/57   BP Location: Left arm  Left arm Right arm   Patient Position: Lying  Lying Lying   Pulse: 77  81    Resp: 18  16 20   Temp: 99 °F (37.2 °C)  98.6 °F (37 °C) 97.9 °F (36.6 °C)   TempSrc: Oral  Oral Oral   SpO2: 100%  100% 100%   Weight:       Height:           Chronically ill-appearing elderly  gentleman wearing sunglasses, in no acute distress.  Normal respiratory effort.  Abdomen with G-tube in place.  Extremities warm well-perfused without edema.  Skin warm and dry.    DIAGNOSTIC DATA:  Results from last 7 days   Lab Units 08/18/24  0759   WBC 10*3/mm3 8.79   HEMOGLOBIN g/dL 7.3*  7.3*   HEMATOCRIT % 23.7*  23.7*   PLATELETS 10*3/mm3 519*     Lab Results   Component Value Date    NEUTROABS 6.41 08/18/2024     Results from last 7 days   Lab Units 08/18/24  0759   SODIUM mmol/L 132*   POTASSIUM mmol/L 3.7   CHLORIDE mmol/L 99   CO2 mmol/L 26.0   BUN mg/dL 14   CREATININE mg/dL 0.55*   GLUCOSE mg/dL 94   CALCIUM mg/dL 8.7     Results from last 7 days   Lab Units 08/17/24  0522   INR  1.52*   APTT seconds 36.1*           IMAGING:    None reviewed    ASSESSMENT:  This is a 79 y.o. male with:    *Normocytic anemia  Transferred from his nursing home for a hemoglobin of 6.7  Ferritin 869, iron 14, vitamin B12 914, folic acid greater than 20, haptoglobin 361  He was seen by Dr. Quarles for initial consultation in the office on 8/12/2024.  Ferritin was high, iron low.  His plan was to initiate Procrit every couple of weeks and he has a follow-up appointment scheduled next month.  He has not yet received Procrit.  GI following with consideration of endoscopy  Hemoglobin stable at 7.3.  No transfusions have been administered.    *Ankylosing spondylitis  On methotrexate which can certainly be contributing to  anemia    *Elevated TSH  Now on levothyroxine for hypothyroidism    *Mildly elevated liver labs  Viral hepatitis panel negative  CT imaging pending    *PEG tube in place for nutrition    Elevated IgG level on serum immunofixation but no M spike.  Elevated light chains with a ratio is nearly normal.  Therefore, no evidence for monoclonal gammopathy.    RECOMMENDATIONS/PLAN:   GI following  Daily CBC.  Transfuse as needed to maintain hemoglobin greater than 7.  Stool for occult blood pending  I will check an erythropoietin level.  Right now I do not think he needs any intravenous iron as iron studies and ferritin are consistent with chronic disease.  We will consider initiating Procrit as an inpatient.  He currently has labs and a Procrit injection scheduled on 8/26 and 9/11 with an appointment with Dr. Quarles on that day.  We will follow    Haider Mckeon MD

## 2024-08-18 NOTE — CONSULTS
Gastroenterology   Initial Inpatient Consult Note    Referring Provider: Luther Miranda    Reason for Consultation: Anemia    Subjective     History of present illness:    79 y.o. male came from a nursing home for low hemoglobin.  Hemoglobin was 6.8    Patient was just seen as a new consult in our practice on 8/8/2024 for chronic anemia with a baseline hemoglobin of around 9 immobility hypertension recurrent UTI severe malnutrition.  Wheelchair-bound difficulty swallowing and has a PEG tube    Recent evaluation has shown moderate oropharyngeal dysphagia    CT scan on 6/18/2024 showed an unremarkable gallbladder liver pancreas and spleen no ductal dilatation gaseous distention of the transverse colon possible ileus    Hemoglobin here 7.5 then 6.9 then 7.1 INR 1.52    Iron level 14 saturation 8% B12 and folate normal    Patient without complaints this morning    Past Medical History:  Past Medical History:   Diagnosis Date    Abscess of scrotum     MARTITA (acute kidney injury)     Altered mental state     Arthritis     AS (ankylosing spondylitis)     History of MRSA infection     Hypertension     Leukocytosis     Tetrahydrocannabinol (THC) use disorder, mild, abuse 12/20/2023    Uveitis      Past Surgical History:  Past Surgical History:   Procedure Laterality Date    COLONOSCOPY      ENDOSCOPY W/ PEG TUBE PLACEMENT N/A 3/29/2024    Procedure: ESOPHAGOGASTRODUODENOSCOPY WITH PERCUTANEOUS ENDOSCOPIC GASTROSTOMY TUBE INSERTION;  Surgeon: Khadar Broderick MD;  Location: [facility] ENDOSCOPY;  Service: General;  Laterality: N/A;  PRE/POST - DYSPHAGIA    ROTATOR CUFF REPAIR Right     TOTAL HIP ARTHROPLASTY Left 2014      Social History:   Social History     Tobacco Use    Smoking status: Never    Smokeless tobacco: Never   Substance Use Topics    Alcohol use: No      Family History:  Family History   Problem Relation Age of Onset    Alzheimer's disease Mother     Colon cancer Neg Hx     Colon polyps Neg Hx     Crohn's  disease Neg Hx     Irritable bowel syndrome Neg Hx     Ulcerative colitis Neg Hx        Home Meds:  Medications Prior to Admission   Medication Sig Dispense Refill Last Dose    acetaminophen (TYLENOL) 325 MG tablet Take 2 tablets by mouth Every 4 (Four) Hours As Needed for Mild Pain.   8/17/2024    Ascorbic Acid (VITAMIN C PO) Daily.   8/16/2024    atorvastatin (LIPITOR) 40 MG tablet    8/16/2024    bisacodyl (DULCOLAX) 10 MG suppository Insert 1 suppository into the rectum Daily As Needed for Constipation (Use if bisacodyl oral is ineffective).   Past Month    bisacodyl (DULCOLAX) 5 MG EC tablet Take 1 tablet by mouth Daily As Needed for Constipation (Use if polyethylene glycol is ineffective).   Past Week    calcium carbonate (TUMS) 500 MG chewable tablet Chew 2 tablets 2 (Two) Times a Day As Needed for Heartburn.   8/16/2024    cyclobenzaprine (FLEXERIL) 10 MG tablet TAKE 0.5   1 TABLET BY ORAL ROUTE AT BEDTIME AS NEEDED   8/16/2024    Effer-K 20 MEQ effervescent tablet    8/16/2024    HYDROcodone-acetaminophen (NORCO) 5-325 MG per tablet    Past Month    lansoprazole (PREVACID SOLUTAB) 30 MG Tablet Delayed Release Dispersible disintegrating tablet Administer 1 tablet per G tube Every Morning.   8/16/2024    melatonin 3 MG tablet Take 1 tablet by mouth At Night As Needed for Sleep.   8/16/2024    Menthol-Zinc Oxide 0.44-20.6 % ointment Apply 1 Application topically to the appropriate area as directed Every 12 (Twelve) Hours.   8/16/2024    methotrexate 2.5 MG tablet Take 1 tablet by mouth 1 (One) Time Per Week. Take 2.5 mg on Wednesday and 2.5 mg on Thursday.  Do not take any medication on Friday through Tuesday.   8/16/2024    mupirocin (BACTROBAN) 2 % ointment Apply 1 Application topically to the appropriate area as directed Every 12 (Twelve) Hours. Apply to Penis Abrasion for 3 more days   8/16/2024    Omega-3 Fatty Acids (OMEGA 3 PO) Take 1 capsule by mouth Daily.   8/16/2024    potassium chloride (KAYCIEL)  20 mEq/15 mL solution Take 15 mL by mouth Daily.   8/16/2024    sennosides-docusate (PERICOLACE) 8.6-50 MG per tablet Take 2 tablets by mouth 2 (Two) Times a Day As Needed for Constipation.   Past Week    terazosin (HYTRIN) 1 MG capsule Administer 1 capsule per G tube Every Night.   8/16/2024    castor oil-balsam peru (VENELEX) ointment Apply 1 Application topically to the appropriate area as directed Every 12 (Twelve) Hours. Apply to foot, heel, and ankle wounds per wound care order.   Unknown    polyethylene glycol (MIRALAX) 17 g packet Take 17 g by mouth Daily As Needed (Use if senna-docusate is ineffective).   More than a month    VITAMIN D PO Daily.   More than a month     Current Meds:   atorvastatin, 40 mg, Oral, Daily  cefTRIAXone, 2,000 mg, Intravenous, Q24H  ciprofloxacin, 2 drop, Right Eye, Q4H  lansoprazole, 30 mg, Per G Tube, Q AM  [START ON 8/21/2024] methotrexate, 2.5 mg, Oral, Once per day on Wednesday Thursday  potassium chloride, 20 mEq, Oral, Daily  senna-docusate sodium, 2 tablet, Oral, BID  sodium chloride, 10 mL, Intravenous, Q12H  terazosin, 1 mg, Per G Tube, Nightly      Allergies:  No Known Allergies  Review of Systems  Pertinent items are noted in HPI, all other systems reviewed and negative    Objective     Vital Signs  Temp:  [98.2 °F (36.8 °C)-99 °F (37.2 °C)] 98.6 °F (37 °C)  Heart Rate:  [72-81] 81  Resp:  [16-20] 16  BP: (103-124)/(51-67) 124/67    Physical Exam:  CONSTITUTIONAL:  today's vital signs reviewed, thin  EARS NOSE THROAT: trachea midline and no deformity of the nares  EYES: no scleral icterus  GASTROINTESTINAL: Quite thin with a left-sided G-tube, no pain but tight  PSYCHIATRIC: appropriate mood and affect  RESPIRATORY: normal inspiratory effort with no increased work of breathing  NEUROLOGIC: patient is awake and alert  DERMATOLOGIC: skin is warm with no cyanosis  LYMPHATIC: no periumbilical lymphadenopathy     Results Review:              I reviewed the patient's new  clinical results.    Results from last 7 days   Lab Units 08/18/24  0023 08/17/24  1549 08/17/24  0522 08/16/24  2115 08/12/24  0920   WBC 10*3/mm3  --   --  8.55 9.35 11.57*   HEMOGLOBIN g/dL 7.1* 6.9* 7.5* 6.7* 8.9*   HEMATOCRIT % 22.6* 22.4* 23.7* 21.6* 27.4*   PLATELETS 10*3/mm3  --   --  498* 470* 364     Results from last 7 days   Lab Units 08/17/24  0522   SODIUM mmol/L 134*   POTASSIUM mmol/L 4.1   CHLORIDE mmol/L 101   CO2 mmol/L 27.5   BUN mg/dL 12   CREATININE mg/dL 0.54*   CALCIUM mg/dL 8.9   BILIRUBIN mg/dL 0.3   ALK PHOS U/L 85   ALT (SGPT) U/L 61*   AST (SGOT) U/L 62*   GLUCOSE mg/dL 97     Results from last 7 days   Lab Units 08/17/24  0522   INR  1.52*     Lab Results   Lab Value Date/Time    LIPASE 62 (H) 03/25/2024 1723    LIPASE 60 12/20/2023 1029    LIPASE 101 (H) 07/14/2017 1257    LIPASE 86 (H) 06/29/2017 1448       Radiology:  CT Abdomen Pelvis With Contrast    (Results Pending)       Assessment & Plan   Active Hospital Problems    Diagnosis     **Acute on chronic anemia     Elevated liver function tests     Indwelling Mariscal catheter present     Thyroid function test abnormal     Dysphagia     Malnutrition     Feeding by G-tube     Hyperlipidemia     UTI (urinary tract infection)     Enlarged prostate     Essential hypertension     Ankylosing spondylitis        Assessment:  Anemia  protein calorie malnutrition  Iron deficiency  Dysphagia status post G-tube  Malnutrition    Plan:  Nutrition evaluation  Transfuse as needed  Watch for overt signs of bleeding  Possible iron infusion given conversation nurse practitioner had with family at the time of her consultation in the GI office  Discussion regarding endoscopic procedures with the patient and family      I discussed the patients findings and my recommendations with patient.           Ed Shannon M.D.  [facility] [address] [address]  Office: [phone]

## 2024-08-18 NOTE — PROGRESS NOTES
Name: Anthony Gallegos ADMIT: 2024   : 1945  PCP: Keshav Eason MD    MRN: [id number] LOS: 0 days   AGE/SEX: 79 y.o. male  ROOM: [facility]     Subjective   Subjective   Patient reports no abdominal pain.  The normal bowel movement without fresh bright blood per rectum or melena.  No choking/odynophagia/dysphagia with modified diet.  Clear urine in the Mariscal bag.  No fever or chills.    Review of Systems  Cardiovascular/respiratory.  No chest pain/no palpitations/no cough/no shortness of breath     Objective   Objective   Vital Signs  Temp:  [97.9 °F (36.6 °C)-99 °F (37.2 °C)] 97.9 °F (36.6 °C)  Heart Rate:  [77-81] 81  Resp:  [16-20] 20  BP: (103-124)/(51-67) 105/57  SpO2:  [100 %] 100 %  on   ;   Device (Oxygen Therapy): room air    Intake/Output Summary (Last 24 hours) at 2024 1035  Last data filed at 2024 0908  Gross per 24 hour   Intake 447 ml   Output 600 ml   Net -153 ml     Body mass index is 17.53 kg/m².      24  0730   Weight: 63.6 kg (140 lb 3.4 oz)     Physical Exam  General.  Elderly gentleman.  He is alert and oriented x 4.  In no apparent pain/distress/diaphoresis.  Normal mood and affect.  Eyes.  Pupils equal round and reactive.  Intact extraocular musculature.  No pallor or jaundice.      Oral cavity.  Mucous membrane  Neck.  Stiff (old) no JVD.  No lymphadenopathy or thyromegaly.  Cardiovascular.  Regular rate and rhythm and grade 2 systolic murmur.  Chest.  Clear to auscultation bilaterally with no added sounds.  Abdomen.  Soft lax.  G-tube in place.  No organomegaly.  No guarding or rebound.  G-tube in place with healthy side.  .  No CVA tenderness.  Mariscal catheter in place with clear urine.  CNS.  No acute focal neurological deficits.  Extremities.  No clubbing/cyanosis/edema.  Dressed wound on both lower extremities.        Results Review:      Results from last 7 days   Lab Units 24  0759 24  0522   SODIUM mmol/L 132* 134*   POTASSIUM mmol/L 3.7 4.1  "  CHLORIDE mmol/L 99 101   CO2 mmol/L 26.0 27.5   BUN mg/dL 14 12   CREATININE mg/dL 0.55* 0.54*   GLUCOSE mg/dL 94 97   CALCIUM mg/dL 8.7 8.9   AST (SGOT) U/L 44* 62*   ALT (SGPT) U/L 45* 61*     Estimated Creatinine Clearance: 96.4 mL/min (A) (by C-G formula based on SCr of 0.55 mg/dL (L)).                  Results from last 7 days   Lab Units 08/17/24  1549   TSH uIU/mL 6.800*               Invalid input(s): \"LDLCALC\"  Results from last 7 days   Lab Units 08/18/24  0759 08/18/24  0023 08/17/24  1549 08/17/24  0522 08/16/24  2115 08/12/24  0920   WBC 10*3/mm3 8.79  --   --  8.55 9.35 11.57*   HEMOGLOBIN g/dL 7.3*  7.3* 7.1* 6.9* 7.5* 6.7* 8.9*   HEMATOCRIT % 23.7*  23.7* 22.6* 22.4* 23.7* 21.6* 27.4*   PLATELETS 10*3/mm3 519*  --   --  498* 470* 364   MCV fL 83.2  --   --  83.5 84.0 83.0   MCH pg 25.6*  --   --  26.4* 26.1* 27.0   MCHC g/dL 30.8*  --   --  31.6 31.0* 32.5   RDW % 14.4  --   --  14.6 14.8 15.6*   RDW-SD fl 43.2  --   --  43.9 44.8 47.0   MPV fL 8.0  --   --  8.2 8.6 8.4   NEUTROPHIL % % 72.9  --   --  63.0 69.0 63.3   LYMPHOCYTE % % 13.5*  --   --  20.1 16.0* 22.1   MONOCYTES % % 10.4  --   --  11.8 12.2* 8.6   EOSINOPHIL % % 2.0  --   --  3.9 2.2 3.6   BASOPHIL % % 0.7  --   --  0.7 0.4 0.7   IMM GRAN % % 0.5  --   --  0.5 0.2 1.7*   NEUTROS ABS 10*3/mm3 6.41  --   --  5.39 6.44 7.32*   LYMPHS ABS 10*3/mm3 1.19  --   --  1.72 1.50 2.56   MONOS ABS 10*3/mm3 0.91*  --   --  1.01* 1.14* 0.99*   EOS ABS 10*3/mm3 0.18  --   --  0.33 0.21 0.42*   BASOS ABS 10*3/mm3 0.06  --   --  0.06 0.04 0.08   IMMATURE GRANS (ABS) 10*3/mm3 0.04  --   --  0.04 0.02 0.20*   NRBC /100 WBC 0.0  --   --  0.0 0.0 0.0     Results from last 7 days   Lab Units 08/17/24  0522   INR  1.52*   APTT seconds 36.1*                             Results from last 7 days   Lab Units 08/17/24  0536   NITRITE UA  Positive*   WBC UA /HPF Too Numerous to Count*   BACTERIA UA /HPF 2+*   SQUAM EPITHEL UA /HPF None Seen       "     Imaging:  Imaging Results (Last 24 Hours)       ** No results found for the last 24 hours. **               I reviewed the patient's new clinical results / labs / tests / procedures      Assessment/Plan     Active Hospital Problems    Diagnosis  POA    **Acute on chronic anemia [D64.9]  Yes    Hypothyroidism (acquired) [E03.9]  Yes    Elevated liver function tests [R79.89]  Yes    Indwelling Mariscal catheter present [Z97.8]  Not Applicable    Thyroid function test abnormal [R94.6]  Yes    Dysphagia [R13.10]  Yes    Malnutrition [E46]  Yes    Feeding by G-tube [Z93.1]  Not Applicable    Hyperlipidemia [E78.5]  Yes    UTI (urinary tract infection) [N39.0]  Yes    Enlarged prostate [N40.0]  Yes    Essential hypertension [I10]  Yes    Ankylosing spondylitis [M45.9]  Yes      Resolved Hospital Problems   No resolved problems to display.         Acute on chronic iron deficiency anemia/anemia of chronic disease..  Anemia workup suggestive of chronic disease and iron deficiency..  Hemoglobin is worse than baseline.  Awaiting Hemoccult stool.  GI consult noted and appreciated.  GI considering endoscopies.  Hemoglobin is stable.  Patient hemodynamically stable.  Awaiting hematology oncology consult.  Hold on transfusion unless hematology oncology believes it is needed.  Monitor H&H.  Continue proton pump inhibitor.  Elevated liver function test.  Benign GI examination.  Could be secondary to medication.  Awaiting CT scan of the abdomen.  Hepatitis panel is negative.  Stable liver function test..  Monitor liver function test.  Hypertension.  Good control on no medications.  No evidence of angina or congestive heart failure.  Will monitor.  Right infectious conjunctivitis.  On Cipro eyedrops.  Improving.  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  Urine culture is pending.  Currently on Rocephin.  Awaiting CT of the abdomen and  pelvis.  Ankylosing spondylitis.  Continue methotrexate.  Hypothyroidism.  Newly diagnosed.  Initiate low-dose Synthroid and monitor as an outpatient.   Dysphagia/GERD/malnutrition.  Speech recommends puréed and honey thickened liquids.  Nutrition G-tube feeds.  Continue proton pump inhibitors.  Dyslipidemia.  Continue Lipitor.   Bilateral lower extremity wounds..  The wound nurse on board for local care.    VTE prophylaxis.  Sequential compression device.        Discussed My findings and plan of treatment with the patient  Disposition.  Eventually back to skilled facility once medically stable.        Luther Rojas MD  [facility]  08/18/24  10:35 EDT

## 2024-08-18 NOTE — PROGRESS NOTES
"Nutrition Services    Patient Name:  Anthony Gallegos  YOB: 1945  MRN: [id number]  Admit Date:  8/17/2024  Assessment Date:  08/18/24    Summary: Follow up note  Spoke with RN who had started TF's last night Nutren 2.0 at 30ml/hr and water [id number]. Pt also on diet pureed Honey Thick Liquid per speech recommendations. Pt tolerating TF's at this time. Po intake 0% for dinner last night and 50% for breakfast this am.  Labs/skin reviewed. Na 132, alt 45, alb 2.6    Plan/Recommendations  Good po intake encouraged and will monitor  Gtube feeds of Nutren 2.0 work up to goal at 45ml/hr  Water flushes [id number] to manage  Erich 1 pkg bolus BID via gtube for wound healing  If po intake improves, consider nocturnal feeds.  RD to follow    CLINICAL NUTRITION ASSESSMENT      Reason for Assessment Follow-up Protocol     Diagnosis/Problem   Anemia, failure to thrive, malnutrition   Medical/Surgical History Past Medical History:   Diagnosis Date    Abscess of scrotum     MARTITA (acute kidney injury)     Altered mental state     Arthritis     AS (ankylosing spondylitis)     History of MRSA infection     Hypertension     Leukocytosis     Tetrahydrocannabinol (THC) use disorder, mild, abuse 12/20/2023    Uveitis        Past Surgical History:   Procedure Laterality Date    COLONOSCOPY      ENDOSCOPY W/ PEG TUBE PLACEMENT N/A 3/29/2024    Procedure: ESOPHAGOGASTRODUODENOSCOPY WITH PERCUTANEOUS ENDOSCOPIC GASTROSTOMY TUBE INSERTION;  Surgeon: Khadar Broderick MD;  Location: [facility] ENDOSCOPY;  Service: General;  Laterality: N/A;  PRE/POST - DYSPHAGIA    ROTATOR CUFF REPAIR Right     TOTAL HIP ARTHROPLASTY Left 2014        Anthropometrics        Current Height  Current Weight  BMI kg/m2 Height: 190.5 cm (75\")  Weight: 63.6 kg (140 lb 3.4 oz) (08/17/24 0730)  Body mass index is 17.53 kg/m².   Adjusted BMI (if applicable)    BMI Category Underweight (18.4 or below)   Ideal Body Weight (IBW) 186lb   Usual Body Weight " (UBW) 180's   Weight Trend Loss   Weight History Wt Readings from Last 30 Encounters:   08/17/24 0730 63.6 kg (140 lb 3.4 oz)   08/08/24 1044 63.6 kg (140 lb 4.8 oz)   06/27/24 0300 71 kg (156 lb 8.4 oz)   06/26/24 0500 71 kg (156 lb 8.4 oz)   06/25/24 0545 71 kg (156 lb 8.4 oz)   06/23/24 0600 60.4 kg (133 lb 2.5 oz)   06/22/24 0556 61.8 kg (136 lb 3.9 oz)   06/21/24 0500 62 kg (136 lb 11 oz)   06/20/24 0419 65.4 kg (144 lb 2.9 oz)   06/17/24 2351 63.1 kg (139 lb 1.6 oz)   04/09/24 0516 90.4 kg (199 lb 4.7 oz)   04/08/24 0420 90.5 kg (199 lb 8.3 oz)   04/07/24 0600 81 kg (178 lb 9.2 oz)   04/06/24 0500 80 kg (176 lb 5.9 oz)   04/03/24 0521 81.4 kg (179 lb 7.3 oz)   04/02/24 0435 80.4 kg (177 lb 4 oz)   04/01/24 0420 80.9 kg (178 lb 5.6 oz)   03/31/24 0530 82.7 kg (182 lb 6.4 oz)   03/30/24 0557 82.2 kg (181 lb 3.5 oz)   03/29/24 0548 82.6 kg (182 lb 1.6 oz)   03/28/24 0500 76.9 kg (169 lb 8 oz)   03/27/24 0617 76.4 kg (168 lb 6.4 oz)   03/25/24 2123 69.1 kg (152 lb 6.4 oz)   03/25/24 1516 79 kg (174 lb 2.6 oz)   01/31/24 0639 79 kg (174 lb 3.2 oz)   01/29/24 0509 78.8 kg (173 lb 11.6 oz)   01/28/24 0530 77.7 kg (171 lb 4.8 oz)   01/25/24 0537 76.4 kg (168 lb 6.9 oz)   01/23/24 0542 73.2 kg (161 lb 6 oz)   01/22/24 0502 75.1 kg (165 lb 9.1 oz)   01/21/24 2304 75.5 kg (166 lb 7.2 oz)   01/21/24 1744 81.6 kg (180 lb)   01/08/24 0253 81.6 kg (179 lb 14.3 oz)   01/05/24 0440 87.9 kg (193 lb 12.6 oz)   01/04/24 0439 85.6 kg (188 lb 11.4 oz)   01/03/24 0513 82.5 kg (181 lb 14.1 oz)   12/31/23 1300 80.5 kg (177 lb 7.5 oz)   12/31/23 0350 83.8 kg (184 lb 11.9 oz)   12/30/23 0511 85.3 kg (188 lb 0.8 oz)   12/29/23 0341 85.7 kg (188 lb 15 oz)   12/28/23 0435 85.4 kg (188 lb 4.4 oz)   12/27/23 0755 81 kg (178 lb 9.2 oz)   12/27/23 0423 85.4 kg (188 lb 4.4 oz)   12/25/23 0707 85.2 kg (187 lb 13.3 oz)   12/23/23 0608 81.3 kg (179 lb 3.7 oz)   12/22/23 0430 81.8 kg (180 lb 5.4 oz)   12/20/23 1005 81.9 kg (180 lb 8 oz)   03/27/18  1439 100 kg (221 lb)   03/22/18 1613 101 kg (222 lb 12.8 oz)   01/23/18 1414 101 kg (223 lb)   12/26/17 1111 99.8 kg (220 lb)   10/19/17 1239 95.3 kg (210 lb)   08/30/17 1459 91.3 kg (201 lb 3.2 oz)   08/29/17 1221 93 kg (205 lb)   08/10/17 1517 88 kg (194 lb)   07/14/17 1041 85.9 kg (189 lb 6.4 oz)   06/29/17 1318 84.5 kg (186 lb 3.2 oz)   06/21/17 1922 90.7 kg (200 lb)      --  Labs       Pertinent Labs    Results from last 7 days   Lab Units 08/18/24  0759 08/17/24  0522   SODIUM mmol/L 132* 134*   POTASSIUM mmol/L 3.7 4.1   CHLORIDE mmol/L 99 101   CO2 mmol/L 26.0 27.5   BUN mg/dL 14 12   CREATININE mg/dL 0.55* 0.54*   CALCIUM mg/dL 8.7 8.9   BILIRUBIN mg/dL 0.3 0.3   ALK PHOS U/L 88 85   ALT (SGPT) U/L 45* 61*   AST (SGOT) U/L 44* 62*   GLUCOSE mg/dL 94 97     Results from last 7 days   Lab Units 08/18/24  0759   HEMOGLOBIN g/dL 7.3*  7.3*   HEMATOCRIT % 23.7*  23.7*   WBC 10*3/mm3 8.79   ALBUMIN g/dL 2.6*     Results from last 7 days   Lab Units 08/18/24  0759 08/17/24  0522 08/16/24  2115 08/12/24  0920   INR   --  1.52*  --   --    APTT seconds  --  36.1*  --   --    PLATELETS 10*3/mm3 519* 498* 470* 364     COVID19   Date Value Ref Range Status   03/25/2024 Not Detected Not Detected - Ref. Range Final     Lab Results   Component Value Date    HGBA1C 5.80 (H) 01/23/2024          Medications           Scheduled Medications atorvastatin, 40 mg, Oral, Daily  cefTRIAXone, 2,000 mg, Intravenous, Q24H  ciprofloxacin, 2 drop, Right Eye, Q4H  lansoprazole, 30 mg, Per G Tube, Q AM  [START ON 8/21/2024] methotrexate, 2.5 mg, Oral, Once per day on Wednesday Thursday  potassium chloride, 20 mEq, Oral, Daily  senna-docusate sodium, 2 tablet, Oral, BID  sodium chloride, 10 mL, Intravenous, Q12H  terazosin, 1 mg, Per G Tube, Nightly       Infusions     PRN Medications   acetaminophen **OR** acetaminophen **OR** acetaminophen    senna-docusate sodium **AND** polyethylene glycol **AND** bisacodyl **AND** bisacodyl     Calcium Replacement - Follow Nurse / BPA Driven Protocol    cyclobenzaprine    HYDROcodone-acetaminophen    Magnesium Standard Dose Replacement - Follow Nurse / BPA Driven Protocol    melatonin    nitroglycerin    ondansetron ODT **OR** ondansetron    Phosphorus Replacement - Follow Nurse / BPA Driven Protocol    Potassium Replacement - Follow Nurse / BPA Driven Protocol    [COMPLETED] Insert Peripheral IV **AND** sodium chloride    sodium chloride    sodium chloride     Physical Findings          General Findings generalized wasting, loss of muscle mass, loss of subcutaneous fat   Oral/Mouth Cavity tooth or teeth missing   Edema  no edema   Gastrointestinal fecal incontinence   Skin  pressure injury: left posterior perineum, right distal leg, right posterior greater trochanter, left posterior heel, right lateral foot, posterior penis, bilateral coccyx   Tubes/Drains/Lines gastrostomy tube   NFPE See Malnutrition Severity Assessment, Date Completed: 8/17   --  Malnutrition Severity Assessment      Patient meets criteria for : Severe Malnutrition           Estimated/Assessed Needs        Current Weight  Weight: 63.6 kg (140 lb 3.4 oz) (08/17/24 0730)       Energy Requirements    Weight for Calculation 63.6 kg   Method for Estimation  30-35 kcal/kg   EST Needs (kcal/day) 8201-6242       Protein Requirements    Weight for Calculation 63.6 kg   EST Protein Needs (g/kg) 1.5 gm/kg   EST Daily Needs (g/day) 95       Fluid Requirements     Method for Estimation 1 mL/kcal    EST Needs (mL/day)      Current Nutrition Orders & Evaluation of Intake       Oral Nutrition     Food Allergies NKFA   Current PO Diet Diet: Regular/House; Texture: Pureed (NDD 1); Fluid Consistency: Honey Thick   Supplement n/a   PO Evaluation     % PO Intake 0-50%    Factors Affecting Intake: chewing difficulty, early satiety , swallow impairment   --   Enteral Nutrition     Enteral Route Gastrostomy    TF Delivery Method Continuous    Propofol  Rate/Kcal     Current TF Order/Rate  Nutren 2.0 @ 30 mL/hr    TF Goal Rate 45 mL/hr    Current Water Flush 30 mL Q 4 hr    Modular None    TF Residual  no or minimal residual    TF Tolerance tolerating    TF Observation Verified correct TF and water flush infusing per orders     PES STATEMENT / NUTRITION DIAGNOSIS      Nutrition Dx Problem  Problem: Underweight, Unintentional Weight Loss, Malnutrition (severe), Inadequate Oral Intake, Increased Nutrient Needs, Biting/Chewing Difficulty, and Swallowing Difficulty  Etiology: Medical Diagnosis - anemia    Signs/Symptoms: Report/Observation     NUTRITION INTERVENTION / PLAN OF CARE      Intervention Goal(s) Reduce/improve symptoms, Meet estimated needs, Disease management/therapy, Tolerate PO , Increase intake, Tolerate TF/PN at goal, and Appropriate weight gain         RD Intervention/Action Await initiation/advancement of PO diet, Await initiation of EN/PN, Continue to monitor, Care plan reviewed, and Recommend/order: enteral   --      Prescription/Orders:       PO Diet       Supplements       Enteral Nutrition       Parenteral Nutrition    New Prescription Ordered?    --   Enteral Prescription:     Enteral Route Gastrostomy    TF Delivery Method Continuous    Enteral Product Nutren 2.0    Modular Juven1 pkg BID    Propofol Rate/Kcal     TF Start Rate  10 mL/hr pt at risk for refeeding syndrome    TF Goal Rate  45 mL/hr    Free Water Flush 30 mL Q 4 hr    Provision at Goal:          Calories 2160 kcal plus loreot (180kcals), meets 100% needs         Protein  90 gm protein, meets 95% needs         Fluid (mL) 745 mL free water + 180 mL in flushes         Monitor/Evaluation Per protocol, I&O, PO intake, Pertinent labs, EN delivery/tolerance, Weight, Skin status, GI status, Symptoms, POC/GOC, Swallow function   Discharge Plan/Needs Pending clinical course   --    RD to follow per protocol.      Electronically signed by:  Tamar Ott RD  08/18/24 09:33 EDT

## 2024-08-18 NOTE — SIGNIFICANT NOTE
08/18/24 1057   OTHER   Discipline physical therapist   Rehab Time/Intention   Session Not Performed other (see comments)  (Pt from [facility]CF, nonambulatory, at baseline level of function, wounds; not appropraite for skilled PT services. Will sign off. Nurse aware and in agreement.)

## 2024-08-18 NOTE — PROGRESS NOTES
REASON FOR FOLLOWUP/CHIEF COMPLAINT: anemia   Anemia  HISTORY OF PRESENT ILLNESS:   No new events overnight.  No bleeding    Past Medical History, Past Surgical History, Social History, Family History have been reviewed and are without significant changes except as mentioned.    Review of Systems   Review of Systems   Constitutional:  Negative for activity change.   HENT:  Negative for nosebleeds and trouble swallowing.    Respiratory:  Negative for shortness of breath and wheezing.    Cardiovascular:  Negative for chest pain and palpitations.   Gastrointestinal:  Negative for constipation, diarrhea and nausea.   Genitourinary:  Negative for dysuria and hematuria.   Musculoskeletal:  Negative for arthralgias and myalgias.   Neurological:  Negative for seizures and syncope.   Hematological:  Negative for adenopathy. Does not bruise/bleed easily.   Psychiatric/Behavioral:  Negative for confusion.        Medications:  The current medication list was reviewed in the EMR    ALLERGIES:  No Known Allergies           Vitals:    08/17/24 2029 08/17/24 2349 08/18/24 0734 08/18/24 1322   BP: 106/51 124/67 105/57 98/51   BP Location:  Left arm Right arm Right arm   Patient Position:  Lying Lying Lying   Pulse:  81     Resp:  16 20 20   Temp:  98.6 °F (37 °C) 97.9 °F (36.6 °C) 98.6 °F (37 °C)   TempSrc:  Oral Oral Oral   SpO2:  100% 100%    Weight:       Height:         Physical Exam    CONSTITUTIONAL:  Vital signs reviewed.  No distress, looks comfortable.  EYES:  Conjunctivae and lids unremarkable.  PERRLA  EARS, NOSE, MOUTH, THROAT:  Ears and nose appear unremarkable.  Lips, teeth, gums appear unremarkable.  RESPIRATORY:  Normal respiratory effort.  Lungs clear to auscultation bilaterally.  CARDIOVASCULAR:  Normal S1, S2.  No murmurs, rubs or gallops.  No significant lower extremity edema.  GASTROINTESTINAL: Abdomen appears unremarkable.  Nontender.  No hepatomegaly.  No splenomegaly.  NEURO: Cranial nerves 2-12  grossly intact.  No focal deficits.  Appears to have equal strength all 4 extremities.  MUSCULOSKELETAL:  Unremarkable digits/nails.  No cyanosis or clubbing.  SKIN:  Warm.  No rashes.  PSYCHIATRIC:  Normal judgment and insight.  Normal mood and affect.      RECENT LABS:  WBC   Date Value Ref Range Status   08/18/2024 8.79 3.40 - 10.80 10*3/mm3 Final   08/17/2024 8.55 3.40 - 10.80 10*3/mm3 Final   08/16/2024 9.35 3.40 - 10.80 10*3/mm3 Final     Hemoglobin   Date Value Ref Range Status   08/18/2024 7.3 (L) 13.0 - 17.7 g/dL Final   08/18/2024 7.3 (L) 13.0 - 17.7 g/dL Final   08/18/2024 7.1 (L) 13.0 - 17.7 g/dL Final   08/17/2024 6.9 (C) 13.0 - 17.7 g/dL Final   08/17/2024 7.5 (L) 13.0 - 17.7 g/dL Final   08/16/2024 6.7 (C) 13.0 - 17.7 g/dL Final     Platelets   Date Value Ref Range Status   08/18/2024 519 (H) 140 - 450 10*3/mm3 Final   08/17/2024 498 (H) 140 - 450 10*3/mm3 Final   08/16/2024 470 (H) 140 - 450 10*3/mm3 Final       ASSESSMENT/PLAN:  Anthony Gallegos S606/1     *Normocytic anemia (reason for admission)  Transferred from his nursing home for a hemoglobin of 6.7  Ferritin 869, iron 14, vitamin B12 914, folic acid greater than 20, haptoglobin 361  He was seen by Dr. Quarles for initial consultation in the office on 8/12/2024.  Ferritin was high, iron low.  His plan was to initiate Procrit every couple of weeks and he has a follow-up appointment scheduled next month.  He has not yet received Procrit.  GI following with consideration of endoscopy  Ferritin 869, 8% saturation.  With the elevated ferritin, IV iron was not given.  Seen by Dr. Quarles on 8/12/2024.  He planned Procrit 20,000 units every 2 weeks for anemia of chronic disease and arranged appointments in the office.  8/19/2024: Procrit 20,000 units for anemia of chronic disease as per Dr. Quarles, his outpatient hematologist  Hb 7.2, from 7.3     *Ankylosing spondylitis  On methotrexate which can certainly be contributing to anemia     *Elevated TSH  Now  on levothyroxine for hypothyroidism     *Mildly elevated liver labs  Viral hepatitis panel negative  CT imaging pending     *PEG tube in place for nutrition     Elevated IgG level on serum immunofixation but no M spike.  Elevated light chains with a ratio is nearly normal.  Therefore, no evidence for monoclonal gammopathy.    *forde asso uti in the setting of history of urine outflow obstruction requiring chronic forde. On antibiotics through hospitalist service.     RECOMMENDATIONS/PLAN:   GI following  Daily CBC.  Transfuse as needed to maintain hemoglobin greater than 7.  Procrit 20,000 units today.  He currently has labs and a Procrit injection scheduled on 8/26 and 9/11 with an appointment with Dr. Quarles on that day.

## 2024-08-19 ENCOUNTER — APPOINTMENT (OUTPATIENT)
Dept: CT IMAGING | Facility: HOSPITAL | Age: 79
DRG: 264 | End: 2024-08-19
Payer: MEDICARE

## 2024-08-19 ENCOUNTER — TELEPHONE (OUTPATIENT)
Dept: GASTROENTEROLOGY | Facility: CLINIC | Age: 79
End: 2024-08-19

## 2024-08-19 PROBLEM — S31.000A SACRAL WOUND: Status: ACTIVE | Noted: 2024-08-17

## 2024-08-19 LAB
ALBUMIN SERPL-MCNC: 2.3 G/DL (ref 3.5–5.2)
ALBUMIN/GLOB SERPL: 0.4 G/DL
ALP SERPL-CCNC: 90 U/L (ref 39–117)
ALT SERPL W P-5'-P-CCNC: 50 U/L (ref 1–41)
ANION GAP SERPL CALCULATED.3IONS-SCNC: 4.2 MMOL/L (ref 5–15)
AST SERPL-CCNC: 49 U/L (ref 1–40)
BACTERIA SPEC AEROBE CULT: ABNORMAL
BASOPHILS # BLD AUTO: 0.06 10*3/MM3 (ref 0–0.2)
BASOPHILS NFR BLD AUTO: 0.6 % (ref 0–1.5)
BILIRUB SERPL-MCNC: <0.2 MG/DL (ref 0–1.2)
BUN SERPL-MCNC: 14 MG/DL (ref 8–23)
BUN/CREAT SERPL: 32.6 (ref 7–25)
CALCIUM SPEC-SCNC: 8.5 MG/DL (ref 8.6–10.5)
CHLORIDE SERPL-SCNC: 102 MMOL/L (ref 98–107)
CO2 SERPL-SCNC: 25.8 MMOL/L (ref 22–29)
CREAT SERPL-MCNC: 0.43 MG/DL (ref 0.76–1.27)
DEPRECATED RDW RBC AUTO: 43.9 FL (ref 37–54)
EGFRCR SERPLBLD CKD-EPI 2021: 107.9 ML/MIN/1.73
EOSINOPHIL # BLD AUTO: 0.18 10*3/MM3 (ref 0–0.4)
EOSINOPHIL NFR BLD AUTO: 1.9 % (ref 0.3–6.2)
ERYTHROCYTE [DISTWIDTH] IN BLOOD BY AUTOMATED COUNT: 14.4 % (ref 12.3–15.4)
GLOBULIN UR ELPH-MCNC: 5.4 GM/DL
GLUCOSE BLDC GLUCOMTR-MCNC: 121 MG/DL (ref 70–130)
GLUCOSE BLDC GLUCOMTR-MCNC: 126 MG/DL (ref 70–130)
GLUCOSE BLDC GLUCOMTR-MCNC: 128 MG/DL (ref 70–130)
GLUCOSE BLDC GLUCOMTR-MCNC: 134 MG/DL (ref 70–130)
GLUCOSE BLDC GLUCOMTR-MCNC: 134 MG/DL (ref 70–130)
GLUCOSE SERPL-MCNC: 123 MG/DL (ref 65–99)
HCT VFR BLD AUTO: 23.5 % (ref 37.5–51)
HCT VFR BLD AUTO: 25.1 % (ref 37.5–51)
HEMOCCULT STL QL: NEGATIVE
HGB BLD-MCNC: 7.2 G/DL (ref 13–17.7)
HGB BLD-MCNC: 7.5 G/DL (ref 13–17.7)
IMM GRANULOCYTES # BLD AUTO: 0.05 10*3/MM3 (ref 0–0.05)
IMM GRANULOCYTES NFR BLD AUTO: 0.5 % (ref 0–0.5)
LYMPHOCYTES # BLD AUTO: 1.16 10*3/MM3 (ref 0.7–3.1)
LYMPHOCYTES NFR BLD AUTO: 12 % (ref 19.6–45.3)
MCH RBC QN AUTO: 25.8 PG (ref 26.6–33)
MCHC RBC AUTO-ENTMCNC: 30.6 G/DL (ref 31.5–35.7)
MCV RBC AUTO: 84.2 FL (ref 79–97)
MONOCYTES # BLD AUTO: 1.03 10*3/MM3 (ref 0.1–0.9)
MONOCYTES NFR BLD AUTO: 10.7 % (ref 5–12)
NEUTROPHILS NFR BLD AUTO: 7.18 10*3/MM3 (ref 1.7–7)
NEUTROPHILS NFR BLD AUTO: 74.3 % (ref 42.7–76)
NRBC BLD AUTO-RTO: 0 /100 WBC (ref 0–0.2)
PLATELET # BLD AUTO: 468 10*3/MM3 (ref 140–450)
PMV BLD AUTO: 8.3 FL (ref 6–12)
POTASSIUM SERPL-SCNC: 3.7 MMOL/L (ref 3.5–5.2)
PROT SERPL-MCNC: 7.7 G/DL (ref 6–8.5)
RBC # BLD AUTO: 2.79 10*6/MM3 (ref 4.14–5.8)
SODIUM SERPL-SCNC: 132 MMOL/L (ref 136–145)
WBC NRBC COR # BLD AUTO: 9.66 10*3/MM3 (ref 3.4–10.8)

## 2024-08-19 PROCEDURE — 25010000002 EPOETIN ALFA-EPBX 10000 UNIT/ML SOLUTION: Performed by: INTERNAL MEDICINE

## 2024-08-19 PROCEDURE — 85025 COMPLETE CBC W/AUTO DIFF WBC: CPT | Performed by: INTERNAL MEDICINE

## 2024-08-19 PROCEDURE — 99214 OFFICE O/P EST MOD 30 MIN: CPT

## 2024-08-19 PROCEDURE — G0378 HOSPITAL OBSERVATION PER HR: HCPCS

## 2024-08-19 PROCEDURE — 99213 OFFICE O/P EST LOW 20 MIN: CPT | Performed by: SURGERY

## 2024-08-19 PROCEDURE — 82668 ASSAY OF ERYTHROPOIETIN: CPT | Performed by: INTERNAL MEDICINE

## 2024-08-19 PROCEDURE — 99232 SBSQ HOSP IP/OBS MODERATE 35: CPT | Performed by: INTERNAL MEDICINE

## 2024-08-19 PROCEDURE — 85014 HEMATOCRIT: CPT | Performed by: INTERNAL MEDICINE

## 2024-08-19 PROCEDURE — 82948 REAGENT STRIP/BLOOD GLUCOSE: CPT

## 2024-08-19 PROCEDURE — 85018 HEMOGLOBIN: CPT | Performed by: INTERNAL MEDICINE

## 2024-08-19 PROCEDURE — 74177 CT ABD & PELVIS W/CONTRAST: CPT

## 2024-08-19 PROCEDURE — 80053 COMPREHEN METABOLIC PANEL: CPT | Performed by: INTERNAL MEDICINE

## 2024-08-19 PROCEDURE — 99221 1ST HOSP IP/OBS SF/LOW 40: CPT | Performed by: SURGERY

## 2024-08-19 PROCEDURE — 25510000001 IOPAMIDOL 61 % SOLUTION: Performed by: INTERNAL MEDICINE

## 2024-08-19 PROCEDURE — 25010000002 ERTAPENEM PER 500 MG: Performed by: INTERNAL MEDICINE

## 2024-08-19 RX ORDER — IOPAMIDOL 612 MG/ML
100 INJECTION, SOLUTION INTRAVASCULAR
Status: COMPLETED | OUTPATIENT
Start: 2024-08-19 | End: 2024-08-19

## 2024-08-19 RX ORDER — SODIUM HYPOCHLORITE 1.25 MG/ML
SOLUTION TOPICAL 2 TIMES DAILY
Status: DISCONTINUED | OUTPATIENT
Start: 2024-08-19 | End: 2024-08-22

## 2024-08-19 RX ADMIN — SENNOSIDES AND DOCUSATE SODIUM 2 TABLET: 50; 8.6 TABLET ORAL at 09:17

## 2024-08-19 RX ADMIN — POTASSIUM CHLORIDE 20 MEQ: 1.5 FOR SOLUTION ORAL at 09:17

## 2024-08-19 RX ADMIN — CIPROFLOXACIN 2 DROP: 3 SOLUTION OPHTHALMIC at 04:56

## 2024-08-19 RX ADMIN — SENNOSIDES AND DOCUSATE SODIUM 2 TABLET: 50; 8.6 TABLET ORAL at 20:11

## 2024-08-19 RX ADMIN — TERAZOSIN 1 MG: 1 CAPSULE ORAL at 20:08

## 2024-08-19 RX ADMIN — CIPROFLOXACIN 2 DROP: 3 SOLUTION OPHTHALMIC at 20:13

## 2024-08-19 RX ADMIN — ERTAPENEM SODIUM 1000 MG: 1 INJECTION, POWDER, LYOPHILIZED, FOR SOLUTION INTRAMUSCULAR; INTRAVENOUS at 11:49

## 2024-08-19 RX ADMIN — CIPROFLOXACIN 2 DROP: 3 SOLUTION OPHTHALMIC at 11:49

## 2024-08-19 RX ADMIN — Medication 10 ML: at 09:18

## 2024-08-19 RX ADMIN — LANSOPRAZOLE 30 MG: 15 TABLET, ORALLY DISINTEGRATING ORAL at 06:18

## 2024-08-19 RX ADMIN — IOPAMIDOL 85 ML: 612 INJECTION, SOLUTION INTRAVENOUS at 16:59

## 2024-08-19 RX ADMIN — EPOETIN ALFA-EPBX 20000 UNITS: 10000 INJECTION, SOLUTION INTRAVENOUS; SUBCUTANEOUS at 18:28

## 2024-08-19 RX ADMIN — CIPROFLOXACIN 2 DROP: 3 SOLUTION OPHTHALMIC at 09:18

## 2024-08-19 RX ADMIN — CIPROFLOXACIN 2 DROP: 3 SOLUTION OPHTHALMIC at 00:44

## 2024-08-19 RX ADMIN — LEVOTHYROXINE SODIUM 25 MCG: 25 TABLET ORAL at 06:18

## 2024-08-19 RX ADMIN — ATORVASTATIN CALCIUM 40 MG: 20 TABLET, FILM COATED ORAL at 09:17

## 2024-08-19 RX ADMIN — Medication 10 ML: at 20:12

## 2024-08-19 NOTE — PLAN OF CARE
Goal Outcome Evaluation:  Plan of Care Reviewed With: patient        Progress: improving  Outcome Evaluation: Pt alert to self and time. Tolerate tube feeds. BM this shift. Mariscal in place, catheter care provided. Dressing change perfomed this shift. Q2 turns. Fecal occult blood negative. H&H q8hrs. VSS.

## 2024-08-19 NOTE — DISCHARGE PLACEMENT REQUEST
"Anthony Gallegos (79 y.o. Male)       Date of Birth   1945    Social Security Number       Address   [address] [place] [address]    Home Phone   [phone]    MRN   [id number]       [facility]    Marital Status                               Admission Date   8/17/24    Admission Type   Emergency    Admitting Provider   Luther Rojas MD    Attending Provider   Sharif Logan MD    Department, Room/Bed   [address], S606/1       Discharge Date       Discharge Disposition       Discharge Destination                                 Attending Provider: Sharif Logan MD    Allergies: No Known Allergies    Isolation: Contact   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), ESBL E coli (06/19/24), Ankita Auris (rule out) (08/17/24)   Code Status: CPR    Ht: 190.5 cm (75\")   Wt: 70.4 kg (155 lb 3.3 oz)    Admission Cmt: None   Principal Problem: Acute on chronic anemia [D64.9]                   Active Insurance as of 8/17/2024       Primary Coverage       Payor Plan Insurance Group Employer/Plan Group    AETNA MEDICARE REPLACEMENT AETNA MED ADV POS [id number]       Payor Plan Address Payor Plan Phone Number Payor Plan Fax Number Effective Dates    [address] [phone]  12/1/2023 - None Entered    [facility] [id number]         Subscriber Name Subscriber Birth Date Member ID       ANTHONY GALLEGOS 1945 [id number]                     Emergency Contacts        (Rel.) Home Phone Work Phone Mobile Phone    NATHALIE DON (Daughter) [phone] -- [phone]    MAGY GALLEGOS (Son) [phone] -- [phone]                "

## 2024-08-19 NOTE — PROGRESS NOTES
[facility]  Inpatient Progress Note    Reason for Follow Up:  Anemia     Subjective     Interval History:   Seen this morning.  He denies any abdominal pain, nausea, vomiting.  Spoke to nurse who denies any signs of overt GI bleeding.  Fecal occult was negative.  Patient request I talked to his daughter.  Called his daughter, Whit this afternoon and discussed negative fecal occult.    Current Facility-Administered Medications:     acetaminophen (TYLENOL) tablet 650 mg, 650 mg, Oral, Q4H PRN **OR** acetaminophen (TYLENOL) 160 MG/5ML oral solution 650 mg, 650 mg, Oral, Q4H PRN **OR** acetaminophen (TYLENOL) suppository 650 mg, 650 mg, Rectal, Q4H PRN, Luther Rojas MD    atorvastatin (LIPITOR) tablet 40 mg, 40 mg, Oral, Daily, Luther Rojas MD, 40 mg at 08/18/24 0855    sennosides-docusate (PERICOLACE) 8.6-50 MG per tablet 2 tablet, 2 tablet, Oral, BID, 2 tablet at 08/18/24 2029 **AND** polyethylene glycol (MIRALAX) packet 17 g, 17 g, Oral, Daily PRN **AND** bisacodyl (DULCOLAX) EC tablet 5 mg, 5 mg, Oral, Daily PRN **AND** bisacodyl (DULCOLAX) suppository 10 mg, 10 mg, Rectal, Daily PRN, Luther oRjas MD    Calcium Replacement - Follow Nurse / BPA Driven Protocol, , Does not apply, PRN, Luther Rojas MD    cefTRIAXone (ROCEPHIN) 2,000 mg in sodium chloride 0.9 % 100 mL MBP, 2,000 mg, Intravenous, Q24H, Luther Rojas MD, Last Rate: 200 mL/hr at 08/18/24 1505, 2,000 mg at 08/18/24 1505    ciprofloxacin (CILOXAN) 0.3 % ophthalmic solution 2 drop, 2 drop, Right Eye, Q4H, Luther Rojas MD, 2 drop at 08/19/24 0456    cyclobenzaprine (FLEXERIL) tablet 5 mg, 5 mg, Oral, Nightly PRN, Luther Rojas MD    HYDROcodone-acetaminophen (NORCO) 5-325 MG per tablet 1 tablet, 1 tablet, Oral, Q6H PRN, Luther Rojas MD    lansoprazole (PREVACID SOLUTAB) disintegrating tablet Tablet Delayed Release Dispersible 30 mg, 30 mg, Per G Tube, Q AM, Luther Rojas MD, 30 mg at 08/19/24  0618    levothyroxine (SYNTHROID, LEVOTHROID) tablet 25 mcg, 25 mcg, Oral, Q AM, Luther Rojas MD, 25 mcg at 08/19/24 0618    Magnesium Standard Dose Replacement - Follow Nurse / BPA Driven Protocol, , Does not apply, PRN, Luther Rojas MD    melatonin tablet 5 mg, 5 mg, Oral, Nightly PRN, Luther Rojas MD    [START ON 8/21/2024] methotrexate tablet 2.5 mg, 2.5 mg, Oral, Once per day on Wednesday Thursday, Luther Rojas MD    nitroglycerin (NITROSTAT) SL tablet 0.4 mg, 0.4 mg, Sublingual, Q5 Min PRN, Luther Rojas MD    ondansetron ODT (ZOFRAN-ODT) disintegrating tablet 4 mg, 4 mg, Oral, Q6H PRN **OR** ondansetron (ZOFRAN) injection 4 mg, 4 mg, Intravenous, Q6H PRN, Luther Rojas MD    Phosphorus Replacement - Follow Nurse / BPA Driven Protocol, , Does not apply, PRN, Luther Rojas MD    potassium chloride (KLOR-CON) packet 20 mEq, 20 mEq, Oral, Daily, Luther Rojas MD, 20 mEq at 08/18/24 1113    Potassium Replacement - Follow Nurse / BPA Driven Protocol, , Does not apply, PRN, Luther Rojas MD    [COMPLETED] Insert Peripheral IV, , , Once **AND** sodium chloride 0.9 % flush 10 mL, 10 mL, Intravenous, PRN, Luther Rojas MD    sodium chloride 0.9 % flush 10 mL, 10 mL, Intravenous, Q12H, Luther Rojas MD, 10 mL at 08/18/24 2029    sodium chloride 0.9 % flush 10 mL, 10 mL, Intravenous, PRN, Luther Rojas MD    sodium chloride 0.9 % infusion 40 mL, 40 mL, Intravenous, PRN, Luther Rojas MD    terazosin (HYTRIN) capsule 1 mg, 1 mg, Per G Tube, Nightly, Luther Rojas MD      Objective     Vital Signs  Temp:  [98.2 °F (36.8 °C)-98.8 °F (37.1 °C)] 98.2 °F (36.8 °C)  Heart Rate:  [83] 83  Resp:  [16-20] 16  BP: ()/(42-53) 126/53  Body mass index is 19.4 kg/m².    Intake/Output Summary (Last 24 hours) at 8/19/2024 0835  Last data filed at 8/19/2024 0618  Gross per 24 hour   Intake 1179 ml   Output 550 ml   Net 629 ml     No intake/output data recorded.     Physical  Exam:   General: patient awake, alert and cooperative   Cardiovascular: regular rhythm and rate   Pulm: regular and unlabored   Abdomen: soft, nontender, nondistended; normal bowel sounds, PEG tube in place with no surrounding erythema or swelling.     Results Review:     I reviewed the patient's new clinical results.    Results from last 7 days   Lab Units 08/19/24  0059 08/18/24  1545 08/18/24  0759 08/17/24  1549 08/17/24  0522 08/16/24  2115   WBC 10*3/mm3  --   --  8.79  --  8.55 9.35   HEMOGLOBIN g/dL 7.5* 7.0* 7.3*  7.3*   < > 7.5* 6.7*   HEMATOCRIT % 25.1* 23.3* 23.7*  23.7*   < > 23.7* 21.6*   PLATELETS 10*3/mm3  --   --  519*  --  498* 470*    < > = values in this interval not displayed.     Results from last 7 days   Lab Units 08/18/24  0759 08/17/24  0522   SODIUM mmol/L 132* 134*   POTASSIUM mmol/L 3.7 4.1   CHLORIDE mmol/L 99 101   CO2 mmol/L 26.0 27.5   BUN mg/dL 14 12   CREATININE mg/dL 0.55* 0.54*   CALCIUM mg/dL 8.7 8.9   BILIRUBIN mg/dL 0.3 0.3   ALK PHOS U/L 88 85   ALT (SGPT) U/L 45* 61*   AST (SGOT) U/L 44* 62*   GLUCOSE mg/dL 94 97     Results from last 7 days   Lab Units 08/17/24  0522   INR  1.52*     Lab Results   Lab Value Date/Time    LIPASE 62 (H) 03/25/2024 1723    LIPASE 60 12/20/2023 1029    LIPASE 101 (H) 07/14/2017 1257    LIPASE 86 (H) 06/29/2017 1448       Radiology:  CT Abdomen Pelvis With Contrast    (Results Pending)       Assessment & Plan     Active Hospital Problems    Diagnosis     **Acute on chronic anemia     Hypothyroidism (acquired)     Elevated liver function tests     Indwelling Mariscal catheter present     Thyroid function test abnormal     Dysphagia     Malnutrition     Feeding by G-tube     Hyperlipidemia     UTI (urinary tract infection)     Enlarged prostate     Essential hypertension     Ankylosing spondylitis        Assessment:  Anemia  protein calorie malnutrition  Iron deficiency  Dysphagia status post G-tube  Malnutrition    All problems are new to me today      Plan:  Patient has been seen by hematology who is starting Procrit  Fecal occult was negative for blood and he has had no signs of overt GI bleeding.  Had lengthy discussion with daughter over the phone and she would prefer to manage conservatively and avoid endoscopic procedures unless he has signs of bleeding.  Recommend continuing to monitor hemoglobin and transfusing as needed per primary team  Recommend close follow-up with NOHEMI Dalton w/ GI following d/c - appointment already scheduled for 9/11 - advise patient keep this.     At this time, GI will sign off.  Please not hesitate to call back if needed.      Patient and plan of care discussed with attending, Dr. Tata Moy PA-C

## 2024-08-19 NOTE — PROGRESS NOTES
Nutrition Services    Patient Name:  Anthony Gallegos  YOB: 1945  MRN: [id number]  Admit Date:  8/17/2024    Nutrition Services    Patient Name: Anthony Gallegos  YOB: 1945  MRN: [id number]  Admission date: 8/17/2024    PROGRESS NOTE      Encounter Information: Checking in on TF tolerance/po intake.       PO Diet: Diet: Regular/House; Texture: Pureed (NDD 1); Fluid Consistency: Honey Thick   PO Supplements: Erich BID   PO Intake:  0-50% per EMR       Current nutrition support: Nutren 2.0 at 45 mL/hr. Free water flush of 30 mL q 4 hrs.   Nutrition support review: Tolerating TF at goal per EMR. MD to manage free water flushes.       Labs (reviewed below): Na 132, Glu 134/128/123, Cr 0.43, Platelets 468, Albumin 2.3       GI Function:  Fecal incontinence, last BM 8/19       Nutrition Intervention Updates: Plan/Recommendations  Good po intake encouraged and will monitor  Addition of Magic Cups BID (L/D) to support po intake  Gtube feeds of Nutren 2.0 at goal of 45ml/hr  Water flushes [id number]--DM to manage  Erich 1 pkg bolus BID via gtube for wound healing  If po intake improves, consider nocturnal feeds    RD to follow     Results from last 7 days   Lab Units 08/19/24  0802 08/18/24  0759 08/17/24  0522   SODIUM mmol/L 132* 132* 134*   POTASSIUM mmol/L 3.7 3.7 4.1   CHLORIDE mmol/L 102 99 101   CO2 mmol/L 25.8 26.0 27.5   BUN mg/dL 14 14 12   CREATININE mg/dL 0.43* 0.55* 0.54*   CALCIUM mg/dL 8.5* 8.7 8.9   BILIRUBIN mg/dL <0.2 0.3 0.3   ALK PHOS U/L 90 88 85   ALT (SGPT) U/L 50* 45* 61*   AST (SGOT) U/L 49* 44* 62*   GLUCOSE mg/dL 123* 94 97     Results from last 7 days   Lab Units 08/19/24  0802   HEMOGLOBIN g/dL 7.2*   HEMATOCRIT % 23.5*     COVID19   Date Value Ref Range Status   03/25/2024 Not Detected Not Detected - Ref. Range Final     Lab Results   Component Value Date    HGBA1C 5.80 (H) 01/23/2024       RD to follow up per protocol.    Electronically signed by:  Mindy  Blades  08/19/24 09:48 EDT

## 2024-08-19 NOTE — PROGRESS NOTES
Discharge Planning Assessment  [facility]     Patient Name: Anthony Gallegos  MRN: [id number]  Today's Date: 8/19/2024    Admit Date: 8/17/2024    Plan: Return to [facility]   Discharge Needs Assessment       Row Name 08/19/24 1001       Living Environment    People in Home facility resident    Current Living Arrangements extended care facility    Potentially Unsafe Housing Conditions none    Primary Care Provided by other (see comments)    Provides Primary Care For no one, unable/limited ability to care for self    Family Caregiver if Needed child(winston), adult    Family Caregiver Names Reva Dove [phone]    Quality of Family Relationships helpful    Able to Return to Prior Arrangements yes       Resource/Environmental Concerns    Resource/Environmental Concerns none    Transportation Concerns none       Transition Planning    Patient/Family Anticipates Transition to long-term care facility    Patient/Family Anticipated Services at Transition rehabilitation services    Transportation Anticipated health plan transportation       Discharge Needs Assessment    Readmission Within the Last 30 Days no previous admission in last 30 days    Equipment Currently Used at Home wheelchair    Concerns to be Addressed discharge planning    Anticipated Changes Related to Illness none    Equipment Needed After Discharge none    Provided Post Acute Provider List? N/A    N/A Provider List Comment From [facility] and will return                   Discharge Plan       Row Name 08/19/24 1002       Plan    Plan Return to [facility]    Plan Comments Spoke with patient at bedside.  He is from LTC at [facility] and plans to return .  He gets up in a W/C, gets tube feeds.  Emergency contact is his daughter Whit Dove [phone].  Message to [facility] and waiting for bed status.  Pacekt in CCP office.  CCP will follow.  Joann MOORE                  Continued Care and  Services - Admitted Since 8/17/2024       Destination       Service Provider Request Status Selected Services Address Phone Fax Patient Preferred    [facility] Pending - Request Sent N/A [address] [address] [phone] --                  Selected Continued Care - Prior Encounters Includes continued care and service providers with selected services from prior encounters from 5/19/2024 to 8/19/2024      Discharged on 6/27/2024 Admission date: 6/17/2024 - Discharge disposition: Intermediate Care       Destination       Service Provider Selected Services Address Phone Fax Patient Preferred    [facility] Skilled Nursing [address] [address] [phone] --                          Expected Discharge Date and Time       Expected Discharge Date Expected Discharge Time    Aug 20, 2024            Demographic Summary       Row Name 08/19/24 1000       General Information    Admission Type observation    Arrived From long-term [facility]    Referral Source admission list    Reason for Consult discharge planning    Preferred Language English                   Functional Status       Row Name 08/19/24 1000       Functional Status    Usual Activity Tolerance fair    Current Activity Tolerance poor       Mental Status    General Appearance WDL X;appearance    General Appearance unshaven;unkempt       Mental Status Summary    Recent Changes in Mental Status/Cognitive Functioning no changes                               Becky S. [place], RN

## 2024-08-19 NOTE — NURSING NOTE
08/19/24 1031   Wound 12/20/23 1000 Bilateral coccyx   Placement Date/Time: 12/20/23 1000   Present on Original Admission: Yes  Side: Bilateral  Location: coccyx   Pressure Injury Stage 4   Base exposed structure;moist;pink;yellow   Periwound intact;redness   Periwound Temperature warm   Wound Length (cm) 8 cm   Wound Width (cm) 7 cm   Wound Depth (cm) 2 cm   Wound Surface Area (cm^2) 56 cm^2   Wound Volume (cm^3) 112 cm^3   Undermining [Depth (cm)/Location] up to 3cms the entire circumference of the wound   Drainage Characteristics/Odor serosanguineous;creamy;malodorous   Drainage Amount moderate   Care, Wound cleansed with;sterile normal saline   Dressing Care dressing changed  (NS wet to moist dressing placed, secured with border guaze dressing. I have ordered Santyl and Dakins wet to dry dressings changes BID. Surgery to consult for wound debridment depending on goals of care.)   Periwound Care dry periwound area maintained   Wound 06/18/24 1345 Right lateral foot Pressure Injury   Placement Date/Time: 06/18/24 1345   Present on Original Admission: Yes  Side: Right  Orientation: lateral  Location: foot  Primary Wound Type: Pressure Injury   Pressure Injury Stage U   Base black eschar   Periwound intact;dry   Periwound Temperature warm   Periwound Skin Turgor firm   Wound Length (cm) 2 cm   Wound Width (cm) 1 cm   Wound Surface Area (cm^2) 2 cm^2   Drainage Amount none   Care, Wound antimicrobial agent applied  (painted with betadine and left open to air)   Wound 06/18/24 1104 Left posterior heel   Placement Date/Time: 06/18/24 1104   Side: Left  Orientation: posterior  Location: heel   Pressure Injury Stage 4   Base black eschar;moist;necrotic;red;yellow;slough   Periwound intact;moist;redness   Periwound Temperature warm   Periwound Skin Turgor soft   Wound Length (cm) 5 cm   Wound Width (cm) 7 cm   Wound Depth (cm) 1 cm   Wound Surface Area (cm^2) 35 cm^2   Wound Volume (cm^3) 35 cm^3   Drainage  Characteristics/Odor malodorous;creamy;tan;serosanguineous   Drainage Amount moderate   Care, Wound cleansed with;antimicrobial agent applied   Dressing Care dressing changed  (betadine wet to dry dressing placed, covered with ABD pad and secured with roll gauze and tape)   Periwound Care dry periwound area maintained   Wound 08/17/24 Left posterior perineum Pressure Injury   Placement Date: 08/17/24   Present on Original Admission: Yes  Side: Left  Orientation: posterior  Location: perineum  Primary Wound Type: Pressure Injury   Pressure Injury Stage U   Base black eschar;moist   Periwound intact;redness;blanchable   Periwound Temperature warm   Wound Length (cm) 4 cm   Wound Width (cm) 4 cm   Wound Depth (cm) 0.4 cm   Wound Surface Area (cm^2) 16 cm^2   Wound Volume (cm^3) 6.4 cm^3   Drainage Characteristics/Odor malodorous;tan   Drainage Amount small   Care, Wound cleansed with;sterile normal saline   Dressing Care dressing changed  (NS wet to moist dressing placed, secured with border guaze dressing.  I have ordered Santyl and Dakins wet to dry dressings changes BID. Surgery to consult for wound debridment depending on goals of care.)   Periwound Care dry periwound area maintained   Wound 08/17/24 Right distal leg Pressure Injury   Placement Date: 08/17/24   Side: Right  Orientation: distal  Location: leg  Primary Wound Type: Pressure Injury   Pressure Injury Stage 3  (healing stage 3 with some surrounding scarring)   Base moist;pink;yellow   Periwound intact;pink  (scarring)   Periwound Temperature warm   Periwound Skin Turgor soft   Wound Length (cm) 2.5 cm   Wound Width (cm) 2.5 cm   Wound Surface Area (cm^2) 6.25 cm^2   Drainage Characteristics/Odor yellow;tan   Drainage Amount small   Care, Wound cleansed with;sterile normal saline   Dressing Care dressing changed;silver impregnated;hydrofiber;silicone;foam   Periwound Care dry periwound area maintained   Wound 08/17/24 Right posterior greater trochanter  Pressure Injury   Placement Date: 08/17/24   Side: Right  Orientation: posterior  Location: greater trochanter  Primary Wound Type: Pressure Injury   Base clean;moist;pink   Periwound intact;pink   Periwound Temperature warm   Wound Length (cm) 2 cm   Wound Width (cm) 1.5 cm   Wound Surface Area (cm^2) 3 cm^2   Drainage Amount   (photo reviewed, Optifoam dressing in place, will have staff to add Opticeg AG under the Optifoam)   Skin Interventions   Pressure Reduction Devices pressure-redistributing mattress utilized;heel offloading device utilized  (Pt is being placed on the Centrella Pro plus mattress)   Pressure Reduction Techniques heels elevated off bed;positioned off wounds;pressure points protected     CWON note: pt seen for evaluation of pressure injuries POA. Pt admitted with failure to thrive, BMI 19 with pronounced bony prominences. Pt has significant pain with turning and repositioning. He is being placed on the Centrella Pro plus mattress for adequate pressure redistribution and control of microclimate. Wounds assessed as above. Both sacral and right heel wounds have exposed bone with risk for osteomyelitis and possible amputation of the foot. We discussed briefly with the pt and he is interested in having a conversation with his daughter and staff regarding goals of care, comfort vs aggressive debridement and treatment for his wounds. I have notified Dr Herbert and his RN regarding this. Pt will need to be turned side to side and heels should be off-loaded at all times. Wound care and prevention standing orders have been placed into [facility]. WOC team will follow prn. Please re-consult if wounds deteriorate or do not improve with current treatment.

## 2024-08-19 NOTE — PROGRESS NOTES
" LOS: 0 days     Name: Anthony Gallegos  Age: 79 y.o.  Sex: male  :  1945  MRN: [id number]         Primary Care Physician: Keshav Eason MD    Subjective   Subjective  Complains of pain in his lower extremities around his wounds, particular right heel.  Also with sacral and ischial wounds.  No acute overnight events.    Objective   Vital Signs  Temp:  [98.2 °F (36.8 °C)-98.8 °F (37.1 °C)] 98.2 °F (36.8 °C)  Heart Rate:  [83] 83  Resp:  [16-20] 16  BP: ()/(42-53) 126/53  Body mass index is 19.4 kg/m².    Objective:  General Appearance:  Comfortable and in no acute distress (Chronically ill, frail, weak and deconditioned).    Vital signs: (most recent): Blood pressure 126/53, pulse 83, temperature 98.2 °F (36.8 °C), temperature source Oral, resp. rate 16, height 190.5 cm (75\"), weight 70.4 kg (155 lb 3.3 oz), SpO2 100%.    Lungs:  Normal effort and normal respiratory rate.  He is not in respiratory distress.  There are decreased breath sounds.    Heart: Normal rate.  Regular rhythm.    Abdomen: Abdomen is soft.  Bowel sounds are normal.   There is no abdominal tenderness.     Extremities: There is no dependent edema or local swelling.    Neurological: Patient is alert and oriented to person, place and time.    Skin:  Warm and dry.  (Wounds of the bilateral lower extremities, particularly the right heel)              Results Review:       I reviewed the patient's new clinical results.    Results from last 7 days   Lab Units 24  0802 24  0059 24  1545 24  0759 24  0023 24  1549 24  0522 24  2115   WBC 10*3/mm3 9.66  --   --  8.79  --   --  8.55 9.35   HEMOGLOBIN g/dL 7.2* 7.5* 7.0* 7.3*  7.3* 7.1* 6.9* 7.5* 6.7*   PLATELETS 10*3/mm3 468*  --   --  519*  --   --  498* 470*     Results from last 7 days   Lab Units 24  0802 24  0759 24  0522   SODIUM mmol/L 132* 132* 134*   POTASSIUM mmol/L 3.7 3.7 4.1   CHLORIDE mmol/L 102 99 101   CO2 " mmol/L 25.8 26.0 27.5   BUN mg/dL 14 14 12   CREATININE mg/dL 0.43* 0.55* 0.54*   CALCIUM mg/dL 8.5* 8.7 8.9   GLUCOSE mg/dL 123* 94 97     Results from last 7 days   Lab Units 08/17/24  0522   INR  1.52*             Scheduled Meds:   atorvastatin, 40 mg, Oral, Daily  ciprofloxacin, 2 drop, Right Eye, Q4H  collagenase, 1 Application, Topical, BID  epoetin alma delia/alma delia-epbx, 20,000 Units, Subcutaneous, Once  ertapenem, 1,000 mg, Intravenous, Q24H  lansoprazole, 30 mg, Per G Tube, Q AM  levothyroxine, 25 mcg, Oral, Q AM  [START ON 8/21/2024] methotrexate, 2.5 mg, Oral, Once per day on Wednesday Thursday  potassium chloride, 20 mEq, Oral, Daily  senna-docusate sodium, 2 tablet, Oral, BID  sodium chloride, 10 mL, Intravenous, Q12H  sodium hypochlorite, , Topical, BID  terazosin, 1 mg, Per G Tube, Nightly      PRN Meds:     acetaminophen **OR** acetaminophen **OR** acetaminophen    senna-docusate sodium **AND** polyethylene glycol **AND** bisacodyl **AND** bisacodyl    Calcium Replacement - Follow Nurse / BPA Driven Protocol    cyclobenzaprine    HYDROcodone-acetaminophen    Magnesium Standard Dose Replacement - Follow Nurse / BPA Driven Protocol    melatonin    nitroglycerin    ondansetron ODT **OR** ondansetron    Phosphorus Replacement - Follow Nurse / BPA Driven Protocol    Potassium Replacement - Follow Nurse / BPA Driven Protocol    [COMPLETED] Insert Peripheral IV **AND** sodium chloride    sodium chloride    sodium chloride  Continuous Infusions:       Assessment & Plan   Active Hospital Problems    Diagnosis  POA    **Acute on chronic anemia [D64.9]  Yes    Hypothyroidism (acquired) [E03.9]  Yes    Elevated liver function tests [R79.89]  Yes    Indwelling Mariscal catheter present [Z97.8]  Not Applicable    Thyroid function test abnormal [R94.6]  Yes    Dysphagia [R13.10]  Yes    Malnutrition [E46]  Yes    Feeding by G-tube [Z93.1]  Not Applicable    Hyperlipidemia [E78.5]  Yes    UTI (urinary tract infection) [N39.0]   Yes    Enlarged prostate [N40.0]  Yes    Essential hypertension [I10]  Yes    Ankylosing spondylitis [M45.9]  Yes      Resolved Hospital Problems   No resolved problems to display.       Assessment & Plan    Acute on chronic iron deficiency anemia/anemia of chronic disease..  Anemia workup suggestive of chronic disease.  GI and hematology following.  He will receive Procrit today.    Elevated liver function test.  Benign GI examination.  Could be secondary to medication.  CT scan of the abdomen/pelvis is pending as outlined below..  Hepatitis panel is negative.  Stable liver function tests.  Okay to continue on statin for now.  Hypertension.  Good control on no medications.  No evidence of angina or congestive heart failure.  Will monitor.  Right infectious conjunctivitis.  On Cipro eyedrops.  Improving.  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  Urine culture has grown ESBL E. coli and he has been switched to Invanz.  Blood cultures negative.  Awaiting CT of the abdomen and pelvis.  Ankylosing spondylitis.  Continue methotrexate.  Hypothyroidism.  Newly diagnosed.  Initiated low-dose Synthroid and monitor as an outpatient.   Dysphagia/GERD/malnutrition.  Speech recommends puréed and honey thickened liquids.  Nutrition G-tube feeds.  Continue proton pump inhibitors.  Dyslipidemia.  Continue Lipitor.   Bilateral lower extremity wounds/sacral and ischial wounds.  I discussed with the wound care team today who evaluated the patient and recommend general surgery evaluation for potential debridement of the sacral and ischial wounds as well as podiatry evaluation for potential debridement of right heel wound.  Specialty mattress has been ordered.  VTE prophylaxis.  Sequential compression device.      Expected Discharge Date: 8/20/2024; Expected Discharge Time:      Sharif Logan MD  [facility]  08/19/24  11:34 EDT

## 2024-08-20 ENCOUNTER — ANESTHESIA EVENT (OUTPATIENT)
Dept: PERIOP | Facility: HOSPITAL | Age: 79
End: 2024-08-20
Payer: MEDICARE

## 2024-08-20 ENCOUNTER — ANESTHESIA (OUTPATIENT)
Dept: PERIOP | Facility: HOSPITAL | Age: 79
End: 2024-08-20
Payer: MEDICARE

## 2024-08-20 LAB
ALBUMIN SERPL-MCNC: 2.4 G/DL (ref 3.5–5.2)
ALBUMIN/GLOB SERPL: 0.5 G/DL
ALP SERPL-CCNC: 86 U/L (ref 39–117)
ALT SERPL W P-5'-P-CCNC: 50 U/L (ref 1–41)
ANION GAP SERPL CALCULATED.3IONS-SCNC: 4 MMOL/L (ref 5–15)
AST SERPL-CCNC: 50 U/L (ref 1–40)
BASOPHILS # BLD AUTO: 0.04 10*3/MM3 (ref 0–0.2)
BASOPHILS NFR BLD AUTO: 0.5 % (ref 0–1.5)
BILIRUB SERPL-MCNC: 0.2 MG/DL (ref 0–1.2)
BUN SERPL-MCNC: 12 MG/DL (ref 8–23)
BUN/CREAT SERPL: 34.3 (ref 7–25)
CALCIUM SPEC-SCNC: 8.3 MG/DL (ref 8.6–10.5)
CHLORIDE SERPL-SCNC: 105 MMOL/L (ref 98–107)
CO2 SERPL-SCNC: 27 MMOL/L (ref 22–29)
CREAT SERPL-MCNC: 0.35 MG/DL (ref 0.76–1.27)
DEPRECATED RDW RBC AUTO: 45.3 FL (ref 37–54)
EGFRCR SERPLBLD CKD-EPI 2021: 114.8 ML/MIN/1.73
EOSINOPHIL # BLD AUTO: 0.34 10*3/MM3 (ref 0–0.4)
EOSINOPHIL NFR BLD AUTO: 4.4 % (ref 0.3–6.2)
EPO SERPL-ACNC: 48.6 MIU/ML (ref 2.6–18.5)
ERYTHROCYTE [DISTWIDTH] IN BLOOD BY AUTOMATED COUNT: 14.9 % (ref 12.3–15.4)
GLOBULIN UR ELPH-MCNC: 5 GM/DL
GLUCOSE BLDC GLUCOMTR-MCNC: 101 MG/DL (ref 70–130)
GLUCOSE BLDC GLUCOMTR-MCNC: 95 MG/DL (ref 70–130)
GLUCOSE SERPL-MCNC: 93 MG/DL (ref 65–99)
HCT VFR BLD AUTO: 23.1 % (ref 37.5–51)
HGB BLD-MCNC: 7.1 G/DL (ref 13–17.7)
IMM GRANULOCYTES # BLD AUTO: 0.04 10*3/MM3 (ref 0–0.05)
IMM GRANULOCYTES NFR BLD AUTO: 0.5 % (ref 0–0.5)
LYMPHOCYTES # BLD AUTO: 1.18 10*3/MM3 (ref 0.7–3.1)
LYMPHOCYTES NFR BLD AUTO: 15.1 % (ref 19.6–45.3)
MCH RBC QN AUTO: 26 PG (ref 26.6–33)
MCHC RBC AUTO-ENTMCNC: 30.7 G/DL (ref 31.5–35.7)
MCV RBC AUTO: 84.6 FL (ref 79–97)
MONOCYTES # BLD AUTO: 0.88 10*3/MM3 (ref 0.1–0.9)
MONOCYTES NFR BLD AUTO: 11.3 % (ref 5–12)
NEUTROPHILS NFR BLD AUTO: 5.32 10*3/MM3 (ref 1.7–7)
NEUTROPHILS NFR BLD AUTO: 68.2 % (ref 42.7–76)
NRBC BLD AUTO-RTO: 0 /100 WBC (ref 0–0.2)
PLATELET # BLD AUTO: 437 10*3/MM3 (ref 140–450)
PMV BLD AUTO: 8.3 FL (ref 6–12)
POTASSIUM SERPL-SCNC: 3.8 MMOL/L (ref 3.5–5.2)
PROT SERPL-MCNC: 7.4 G/DL (ref 6–8.5)
RBC # BLD AUTO: 2.73 10*6/MM3 (ref 4.14–5.8)
SODIUM SERPL-SCNC: 136 MMOL/L (ref 136–145)
T3 SERPL-MCNC: 73 NG/DL (ref 71–180)
T3FREE SERPL-MCNC: 1.5 PG/ML (ref 2–4.4)
WBC NRBC COR # BLD AUTO: 7.8 10*3/MM3 (ref 3.4–10.8)

## 2024-08-20 PROCEDURE — 11043 DBRDMT MUSC&/FSCA 1ST 20/<: CPT | Performed by: SURGERY

## 2024-08-20 PROCEDURE — 99233 SBSQ HOSP IP/OBS HIGH 50: CPT | Performed by: SURGERY

## 2024-08-20 PROCEDURE — 87176 TISSUE HOMOGENIZATION CULTR: CPT | Performed by: SURGERY

## 2024-08-20 PROCEDURE — 25010000002 PHENYLEPHRINE 10 MG/ML SOLUTION: Performed by: NURSE ANESTHETIST, CERTIFIED REGISTERED

## 2024-08-20 PROCEDURE — 25810000003 LACTATED RINGERS PER 1000 ML: Performed by: ANESTHESIOLOGY

## 2024-08-20 PROCEDURE — 87205 SMEAR GRAM STAIN: CPT | Performed by: SURGERY

## 2024-08-20 PROCEDURE — 0KBP0ZZ EXCISION OF LEFT HIP MUSCLE, OPEN APPROACH: ICD-10-PCS | Performed by: SURGERY

## 2024-08-20 PROCEDURE — 80053 COMPREHEN METABOLIC PANEL: CPT | Performed by: INTERNAL MEDICINE

## 2024-08-20 PROCEDURE — 25010000002 PROPOFOL 200 MG/20ML EMULSION: Performed by: NURSE ANESTHETIST, CERTIFIED REGISTERED

## 2024-08-20 PROCEDURE — 0JBR0ZZ EXCISION OF LEFT FOOT SUBCUTANEOUS TISSUE AND FASCIA, OPEN APPROACH: ICD-10-PCS | Performed by: PODIATRIST

## 2024-08-20 PROCEDURE — 0HB6XZZ EXCISION OF BACK SKIN, EXTERNAL APPROACH: ICD-10-PCS | Performed by: SURGERY

## 2024-08-20 PROCEDURE — 25010000002 SUCCINYLCHOLINE PER 20 MG: Performed by: NURSE ANESTHETIST, CERTIFIED REGISTERED

## 2024-08-20 PROCEDURE — 25010000002 ERTAPENEM PER 500 MG: Performed by: INTERNAL MEDICINE

## 2024-08-20 PROCEDURE — 82948 REAGENT STRIP/BLOOD GLUCOSE: CPT

## 2024-08-20 PROCEDURE — 85025 COMPLETE CBC W/AUTO DIFF WBC: CPT | Performed by: INTERNAL MEDICINE

## 2024-08-20 PROCEDURE — 25010000002 DEXAMETHASONE SODIUM PHOSPHATE 20 MG/5ML SOLUTION: Performed by: NURSE ANESTHETIST, CERTIFIED REGISTERED

## 2024-08-20 PROCEDURE — 87077 CULTURE AEROBIC IDENTIFY: CPT | Performed by: SURGERY

## 2024-08-20 PROCEDURE — 99232 SBSQ HOSP IP/OBS MODERATE 35: CPT | Performed by: INTERNAL MEDICINE

## 2024-08-20 PROCEDURE — 25010000002 ONDANSETRON PER 1 MG: Performed by: NURSE ANESTHETIST, CERTIFIED REGISTERED

## 2024-08-20 PROCEDURE — 25010000002 SUGAMMADEX 200 MG/2ML SOLUTION: Performed by: NURSE ANESTHETIST, CERTIFIED REGISTERED

## 2024-08-20 PROCEDURE — 11046 DBRDMT MUSC&/FSCA EA ADDL: CPT | Performed by: SURGERY

## 2024-08-20 PROCEDURE — 87186 SC STD MICRODIL/AGAR DIL: CPT | Performed by: SURGERY

## 2024-08-20 PROCEDURE — 88304 TISSUE EXAM BY PATHOLOGIST: CPT | Performed by: INTERNAL MEDICINE

## 2024-08-20 PROCEDURE — 87070 CULTURE OTHR SPECIMN AEROBIC: CPT | Performed by: SURGERY

## 2024-08-20 RX ORDER — PROMETHAZINE HYDROCHLORIDE 25 MG/1
25 TABLET ORAL ONCE AS NEEDED
Status: DISCONTINUED | OUTPATIENT
Start: 2024-08-20 | End: 2024-08-20 | Stop reason: HOSPADM

## 2024-08-20 RX ORDER — SUCCINYLCHOLINE CHLORIDE 20 MG/ML
INJECTION INTRAMUSCULAR; INTRAVENOUS AS NEEDED
Status: DISCONTINUED | OUTPATIENT
Start: 2024-08-20 | End: 2024-08-20 | Stop reason: SURG

## 2024-08-20 RX ORDER — PROMETHAZINE HYDROCHLORIDE 25 MG/1
25 SUPPOSITORY RECTAL ONCE AS NEEDED
Status: DISCONTINUED | OUTPATIENT
Start: 2024-08-20 | End: 2024-08-20 | Stop reason: HOSPADM

## 2024-08-20 RX ORDER — FLUMAZENIL 0.1 MG/ML
0.2 INJECTION INTRAVENOUS AS NEEDED
Status: DISCONTINUED | OUTPATIENT
Start: 2024-08-20 | End: 2024-08-20 | Stop reason: HOSPADM

## 2024-08-20 RX ORDER — EPHEDRINE SULFATE 50 MG/ML
5 INJECTION, SOLUTION INTRAVENOUS ONCE AS NEEDED
Status: DISCONTINUED | OUTPATIENT
Start: 2024-08-20 | End: 2024-08-20 | Stop reason: HOSPADM

## 2024-08-20 RX ORDER — ONDANSETRON 2 MG/ML
INJECTION INTRAMUSCULAR; INTRAVENOUS AS NEEDED
Status: DISCONTINUED | OUTPATIENT
Start: 2024-08-20 | End: 2024-08-20 | Stop reason: SURG

## 2024-08-20 RX ORDER — DROPERIDOL 2.5 MG/ML
0.62 INJECTION, SOLUTION INTRAMUSCULAR; INTRAVENOUS
Status: DISCONTINUED | OUTPATIENT
Start: 2024-08-20 | End: 2024-08-20 | Stop reason: HOSPADM

## 2024-08-20 RX ORDER — PHENYLEPHRINE HYDROCHLORIDE 10 MG/ML
INJECTION INTRAVENOUS AS NEEDED
Status: DISCONTINUED | OUTPATIENT
Start: 2024-08-20 | End: 2024-08-20 | Stop reason: SURG

## 2024-08-20 RX ORDER — LABETALOL HYDROCHLORIDE 5 MG/ML
5 INJECTION, SOLUTION INTRAVENOUS
Status: DISCONTINUED | OUTPATIENT
Start: 2024-08-20 | End: 2024-08-20 | Stop reason: HOSPADM

## 2024-08-20 RX ORDER — SODIUM CHLORIDE, SODIUM LACTATE, POTASSIUM CHLORIDE, CALCIUM CHLORIDE 600; 310; 30; 20 MG/100ML; MG/100ML; MG/100ML; MG/100ML
9 INJECTION, SOLUTION INTRAVENOUS CONTINUOUS
Status: DISCONTINUED | OUTPATIENT
Start: 2024-08-20 | End: 2024-08-28 | Stop reason: HOSPADM

## 2024-08-20 RX ORDER — DIPHENHYDRAMINE HYDROCHLORIDE 50 MG/ML
12.5 INJECTION INTRAMUSCULAR; INTRAVENOUS
Status: DISCONTINUED | OUTPATIENT
Start: 2024-08-20 | End: 2024-08-20 | Stop reason: HOSPADM

## 2024-08-20 RX ORDER — SODIUM CHLORIDE 0.9 % (FLUSH) 0.9 %
3-10 SYRINGE (ML) INJECTION AS NEEDED
Status: DISCONTINUED | OUTPATIENT
Start: 2024-08-20 | End: 2024-08-20 | Stop reason: HOSPADM

## 2024-08-20 RX ORDER — PROPOFOL 10 MG/ML
INJECTION, EMULSION INTRAVENOUS AS NEEDED
Status: DISCONTINUED | OUTPATIENT
Start: 2024-08-20 | End: 2024-08-20 | Stop reason: SURG

## 2024-08-20 RX ORDER — ROCURONIUM BROMIDE 10 MG/ML
INJECTION, SOLUTION INTRAVENOUS AS NEEDED
Status: DISCONTINUED | OUTPATIENT
Start: 2024-08-20 | End: 2024-08-20 | Stop reason: SURG

## 2024-08-20 RX ORDER — SODIUM HYPOCHLORITE 1.25 MG/ML
SOLUTION TOPICAL AS NEEDED
Status: DISCONTINUED | OUTPATIENT
Start: 2024-08-20 | End: 2024-08-20 | Stop reason: HOSPADM

## 2024-08-20 RX ORDER — HYDRALAZINE HYDROCHLORIDE 20 MG/ML
5 INJECTION INTRAMUSCULAR; INTRAVENOUS
Status: DISCONTINUED | OUTPATIENT
Start: 2024-08-20 | End: 2024-08-20 | Stop reason: HOSPADM

## 2024-08-20 RX ORDER — HYDROMORPHONE HYDROCHLORIDE 1 MG/ML
0.25 INJECTION, SOLUTION INTRAMUSCULAR; INTRAVENOUS; SUBCUTANEOUS
Status: DISCONTINUED | OUTPATIENT
Start: 2024-08-20 | End: 2024-08-20 | Stop reason: HOSPADM

## 2024-08-20 RX ORDER — LEVOTHYROXINE SODIUM 25 UG/1
TABLET ORAL
COMMUNITY
Start: 2024-08-14

## 2024-08-20 RX ORDER — HYDROCODONE BITARTRATE AND ACETAMINOPHEN 5; 325 MG/1; MG/1
1 TABLET ORAL EVERY 6 HOURS PRN
Status: DISPENSED | OUTPATIENT
Start: 2024-08-20 | End: 2024-08-22

## 2024-08-20 RX ORDER — DEXAMETHASONE SODIUM PHOSPHATE 4 MG/ML
INJECTION, SOLUTION INTRA-ARTICULAR; INTRALESIONAL; INTRAMUSCULAR; INTRAVENOUS; SOFT TISSUE AS NEEDED
Status: DISCONTINUED | OUTPATIENT
Start: 2024-08-20 | End: 2024-08-20 | Stop reason: SURG

## 2024-08-20 RX ORDER — ONDANSETRON 2 MG/ML
4 INJECTION INTRAMUSCULAR; INTRAVENOUS ONCE AS NEEDED
Status: DISCONTINUED | OUTPATIENT
Start: 2024-08-20 | End: 2024-08-20 | Stop reason: HOSPADM

## 2024-08-20 RX ORDER — HYDROCODONE BITARTRATE AND ACETAMINOPHEN 5; 325 MG/1; MG/1
1 TABLET ORAL ONCE AS NEEDED
Status: DISCONTINUED | OUTPATIENT
Start: 2024-08-20 | End: 2024-08-20 | Stop reason: HOSPADM

## 2024-08-20 RX ORDER — NALOXONE HCL 0.4 MG/ML
0.2 VIAL (ML) INJECTION AS NEEDED
Status: DISCONTINUED | OUTPATIENT
Start: 2024-08-20 | End: 2024-08-20 | Stop reason: HOSPADM

## 2024-08-20 RX ORDER — IPRATROPIUM BROMIDE AND ALBUTEROL SULFATE 2.5; .5 MG/3ML; MG/3ML
3 SOLUTION RESPIRATORY (INHALATION) ONCE AS NEEDED
Status: DISCONTINUED | OUTPATIENT
Start: 2024-08-20 | End: 2024-08-20 | Stop reason: HOSPADM

## 2024-08-20 RX ORDER — HYDROCODONE BITARTRATE AND ACETAMINOPHEN 7.5; 325 MG/1; MG/1
1 TABLET ORAL EVERY 4 HOURS PRN
Status: DISCONTINUED | OUTPATIENT
Start: 2024-08-20 | End: 2024-08-20 | Stop reason: HOSPADM

## 2024-08-20 RX ORDER — FENTANYL CITRATE 50 UG/ML
25 INJECTION, SOLUTION INTRAMUSCULAR; INTRAVENOUS
Status: DISCONTINUED | OUTPATIENT
Start: 2024-08-20 | End: 2024-08-20 | Stop reason: HOSPADM

## 2024-08-20 RX ORDER — LIDOCAINE HYDROCHLORIDE 20 MG/ML
INJECTION, SOLUTION INFILTRATION; PERINEURAL AS NEEDED
Status: DISCONTINUED | OUTPATIENT
Start: 2024-08-20 | End: 2024-08-20 | Stop reason: SURG

## 2024-08-20 RX ORDER — LIDOCAINE HYDROCHLORIDE 10 MG/ML
0.5 INJECTION, SOLUTION INFILTRATION; PERINEURAL ONCE AS NEEDED
Status: DISCONTINUED | OUTPATIENT
Start: 2024-08-20 | End: 2024-08-20 | Stop reason: HOSPADM

## 2024-08-20 RX ORDER — SODIUM CHLORIDE 0.9 % (FLUSH) 0.9 %
3 SYRINGE (ML) INJECTION EVERY 12 HOURS SCHEDULED
Status: DISCONTINUED | OUTPATIENT
Start: 2024-08-20 | End: 2024-08-20 | Stop reason: HOSPADM

## 2024-08-20 RX ORDER — COLLAGENASE SANTYL 250 [ARB'U]/G
OINTMENT TOPICAL
COMMUNITY
Start: 2024-08-16 | End: 2024-08-28 | Stop reason: HOSPADM

## 2024-08-20 RX ADMIN — ONDANSETRON 4 MG: 2 INJECTION INTRAMUSCULAR; INTRAVENOUS at 13:18

## 2024-08-20 RX ADMIN — HYDROCODONE BITARTRATE AND ACETAMINOPHEN 1 TABLET: 5; 325 TABLET ORAL at 21:26

## 2024-08-20 RX ADMIN — DEXAMETHASONE SODIUM PHOSPHATE 8 MG: 4 INJECTION, SOLUTION INTRAMUSCULAR; INTRAVENOUS at 12:16

## 2024-08-20 RX ADMIN — PHENYLEPHRINE HYDROCHLORIDE 200 MCG: 10 INJECTION INTRAVENOUS at 12:27

## 2024-08-20 RX ADMIN — SUGAMMADEX 200 MG: 100 INJECTION, SOLUTION INTRAVENOUS at 13:26

## 2024-08-20 RX ADMIN — CIPROFLOXACIN 2 DROP: 3 SOLUTION OPHTHALMIC at 16:08

## 2024-08-20 RX ADMIN — PROPOFOL 100 MG: 10 INJECTION, EMULSION INTRAVENOUS at 12:12

## 2024-08-20 RX ADMIN — CIPROFLOXACIN 2 DROP: 3 SOLUTION OPHTHALMIC at 00:29

## 2024-08-20 RX ADMIN — PHENYLEPHRINE HYDROCHLORIDE 200 MCG: 10 INJECTION INTRAVENOUS at 13:11

## 2024-08-20 RX ADMIN — PHENYLEPHRINE HYDROCHLORIDE 100 MCG: 10 INJECTION INTRAVENOUS at 12:55

## 2024-08-20 RX ADMIN — PHENYLEPHRINE HYDROCHLORIDE 100 MCG: 10 INJECTION INTRAVENOUS at 12:44

## 2024-08-20 RX ADMIN — SENNOSIDES AND DOCUSATE SODIUM 2 TABLET: 50; 8.6 TABLET ORAL at 21:21

## 2024-08-20 RX ADMIN — SODIUM CHLORIDE, POTASSIUM CHLORIDE, SODIUM LACTATE AND CALCIUM CHLORIDE 9 ML/HR: 600; 310; 30; 20 INJECTION, SOLUTION INTRAVENOUS at 11:48

## 2024-08-20 RX ADMIN — PHENYLEPHRINE HYDROCHLORIDE 100 MCG: 10 INJECTION INTRAVENOUS at 12:25

## 2024-08-20 RX ADMIN — PHENYLEPHRINE HYDROCHLORIDE 100 MCG: 10 INJECTION INTRAVENOUS at 12:37

## 2024-08-20 RX ADMIN — POTASSIUM CHLORIDE 20 MEQ: 1.5 FOR SOLUTION ORAL at 16:07

## 2024-08-20 RX ADMIN — PHENYLEPHRINE HYDROCHLORIDE 200 MCG: 10 INJECTION INTRAVENOUS at 12:40

## 2024-08-20 RX ADMIN — LANSOPRAZOLE 30 MG: 15 TABLET, ORALLY DISINTEGRATING ORAL at 06:08

## 2024-08-20 RX ADMIN — PHENYLEPHRINE HYDROCHLORIDE 100 MCG: 10 INJECTION INTRAVENOUS at 13:05

## 2024-08-20 RX ADMIN — ROCURONIUM BROMIDE 30 MG: 10 INJECTION, SOLUTION INTRAVENOUS at 12:15

## 2024-08-20 RX ADMIN — ERTAPENEM SODIUM 1000 MG: 1 INJECTION, POWDER, LYOPHILIZED, FOR SOLUTION INTRAMUSCULAR; INTRAVENOUS at 11:48

## 2024-08-20 RX ADMIN — LEVOTHYROXINE SODIUM 25 MCG: 25 TABLET ORAL at 06:08

## 2024-08-20 RX ADMIN — CIPROFLOXACIN 2 DROP: 3 SOLUTION OPHTHALMIC at 21:20

## 2024-08-20 RX ADMIN — SUCCINYLCHOLINE CHLORIDE 120 MG: 20 INJECTION, SOLUTION INTRAMUSCULAR; INTRAVENOUS; PARENTERAL at 12:12

## 2024-08-20 RX ADMIN — PHENYLEPHRINE HYDROCHLORIDE 100 MCG: 10 INJECTION INTRAVENOUS at 12:30

## 2024-08-20 RX ADMIN — LIDOCAINE HYDROCHLORIDE 80 MG: 20 INJECTION, SOLUTION INFILTRATION; PERINEURAL at 12:12

## 2024-08-20 RX ADMIN — PHENYLEPHRINE HYDROCHLORIDE 100 MCG: 10 INJECTION INTRAVENOUS at 13:08

## 2024-08-20 RX ADMIN — CIPROFLOXACIN 2 DROP: 3 SOLUTION OPHTHALMIC at 08:26

## 2024-08-20 RX ADMIN — Medication 10 ML: at 21:21

## 2024-08-20 RX ADMIN — ATORVASTATIN CALCIUM 40 MG: 20 TABLET, FILM COATED ORAL at 16:07

## 2024-08-20 RX ADMIN — PHENYLEPHRINE HYDROCHLORIDE 100 MCG: 10 INJECTION INTRAVENOUS at 12:47

## 2024-08-20 NOTE — PROGRESS NOTES
REASON FOR FOLLOWUP/CHIEF COMPLAINT: anemia   Anemia  HISTORY OF PRESENT ILLNESS:   No new issues overnight.  No bleeding    However, sacral and ischial pressure wounds are going to be surgically debrided today.    Past Medical History, Past Surgical History, Social History, Family History have been reviewed and are without significant changes except as mentioned.    Review of Systems   Review of Systems   Constitutional:  Negative for activity change.   HENT:  Negative for nosebleeds and trouble swallowing.    Respiratory:  Negative for shortness of breath and wheezing.    Cardiovascular:  Negative for chest pain and palpitations.   Gastrointestinal:  Negative for constipation, diarrhea and nausea.   Genitourinary:  Negative for dysuria and hematuria.   Musculoskeletal:  Negative for arthralgias and myalgias.   Neurological:  Negative for seizures and syncope.   Hematological:  Negative for adenopathy. Does not bruise/bleed easily.   Psychiatric/Behavioral:  Negative for confusion.        Medications:  The current medication list was reviewed in the EMR    ALLERGIES:  No Known Allergies           Vitals:    08/19/24 1953 08/19/24 2347 08/20/24 0503 08/20/24 0724   BP: 122/62 116/56  100/52   BP Location: Right arm Right arm  Left arm   Patient Position: Lying Lying  Lying   Pulse:  81     Resp: 16 16  14   Temp: 99.1 °F (37.3 °C) 97.9 °F (36.6 °C)  98.4 °F (36.9 °C)   TempSrc: Oral Oral  Oral   SpO2: 96% 100%  98%   Weight:   72.4 kg (159 lb 9.8 oz)    Height:         Physical Exam    CONSTITUTIONAL:  Vital signs reviewed.  No distress, looks comfortable.  EYES:  Conjunctivae and lids unremarkable.  PERRLA  EARS, NOSE, MOUTH, THROAT:  Ears and nose appear unremarkable.  Lips, teeth, gums appear unremarkable.  RESPIRATORY:  Normal respiratory effort.  Lungs clear to auscultation bilaterally.  CARDIOVASCULAR:  Normal S1, S2.  No murmurs, rubs or gallops.  No significant lower extremity  edema.  GASTROINTESTINAL: Abdomen appears unremarkable.  Nontender.  No hepatomegaly.  No splenomegaly.  NEURO: Cranial nerves 2-12 grossly intact.  No focal deficits.  Appears to have equal strength all 4 extremities.  MUSCULOSKELETAL:  Unremarkable digits/nails.  No cyanosis or clubbing.  SKIN:  Warm.  No rashes.  PSYCHIATRIC:  Normal judgment and insight.  Normal mood and affect.       RECENT LABS:  WBC   Date Value Ref Range Status   08/20/2024 7.80 3.40 - 10.80 10*3/mm3 Final   08/19/2024 9.66 3.40 - 10.80 10*3/mm3 Final   08/18/2024 8.79 3.40 - 10.80 10*3/mm3 Final     Hemoglobin   Date Value Ref Range Status   08/20/2024 7.1 (L) 13.0 - 17.7 g/dL Final   08/19/2024 7.2 (L) 13.0 - 17.7 g/dL Final   08/19/2024 7.5 (L) 13.0 - 17.7 g/dL Final   08/18/2024 7.0 (L) 13.0 - 17.7 g/dL Final   08/18/2024 7.3 (L) 13.0 - 17.7 g/dL Final   08/18/2024 7.3 (L) 13.0 - 17.7 g/dL Final   08/18/2024 7.1 (L) 13.0 - 17.7 g/dL Final   08/17/2024 6.9 (C) 13.0 - 17.7 g/dL Final     Platelets   Date Value Ref Range Status   08/20/2024 437 140 - 450 10*3/mm3 Final   08/19/2024 468 (H) 140 - 450 10*3/mm3 Final   08/18/2024 519 (H) 140 - 450 10*3/mm3 Final       ASSESSMENT/PLAN:  Anthony MANCERA Main OR/MAIN OR     *Normocytic anemia (reason for admission)  Transferred from his nursing home for a hemoglobin of 6.7  Ferritin 869, iron 14, vitamin B12 914, folic acid greater than 20, haptoglobin 361  He was seen by Dr. Quarles for initial consultation in the office on 8/12/2024.  Ferritin was high, iron low.  His plan was to initiate Procrit every couple of weeks and he has a follow-up appointment scheduled next month.  He has not yet received Procrit.  GI following with consideration of endoscopy  Ferritin 869, 8% saturation.  With the elevated ferritin, IV iron was not given.  Seen by Dr. Quarles on 8/12/2024.  He planned Procrit 20,000 units every 2 weeks for anemia of chronic disease and arranged appointments in the office.  8/19/2024:  Procrit 20,000 units for anemia of chronic disease as per Dr. Quarles, his outpatient hematologist  Hb 7.1, from 7.2, from 7.3     *Ankylosing spondylitis  On methotrexate which can certainly be contributing to anemia     *Elevated TSH  Now on levothyroxine for hypothyroidism     *Mildly elevated liver labs  Viral hepatitis panel negative  CT imaging pending     *PEG tube in place for nutrition     Elevated IgG level on serum immunofixation but no M spike.  Elevated light chains with a ratio is nearly normal.  Therefore, no evidence for monoclonal gammopathy.    *forde asso uti in the setting of history of urine outflow obstruction requiring chronic forde. On antibiotics through hospitalist service.    *Sacral and ischial pressure wounds.  Surgical debridement 8/20/2024.     RECOMMENDATIONS/PLAN:   GI following  Daily CBC.  Transfuse as needed to maintain hemoglobin greater than 7.  He currently has labs and a Procrit injection scheduled on 8/26 and 9/11 with an appointment with Dr. Quarles on that day.

## 2024-08-20 NOTE — PROGRESS NOTES
"General Surgery Progress Note      S: No acute events. Discussed surgery with his daughter who would like to proceed.    O:/52 (BP Location: Left arm, Patient Position: Lying)   Pulse 81   Temp 98.4 °F (36.9 °C) (Oral)   Resp 14   Ht 190.5 cm (75\")   Wt 72.4 kg (159 lb 9.8 oz)   SpO2 98%   BMI 19.95 kg/m²     Intake & Output (last day)         08/19 0701 08/20 0700 08/20 0701 08/21 0700    P.O.      Other 210     NG/     Total Intake(mL/kg) 464 (6.4)     Urine (mL/kg/hr) 500 (0.3)     Total Output 500     Net -36                   GENERAL: awake, alert, no apparent distress  HEENT: normochephalic, atraumatic, moist mucus membranes, clear sclera   CHEST: clear to auscultation, no wheezes, no increased work of breathing  CARDIAC: regular rate and rhythm    EXTREMITIES: no cyanosis, clubbing or edema    SKIN: Warm and moist, no rashes    LABS REVIEWED:   Results from last 7 days   Lab Units 08/20/24  0537 08/19/24  0802 08/19/24  0059 08/18/24  1545 08/18/24  0759   WBC 10*3/mm3 7.80 9.66  --   --  8.79   HEMOGLOBIN g/dL 7.1* 7.2* 7.5*   < > 7.3*  7.3*   HEMATOCRIT % 23.1* 23.5* 25.1*   < > 23.7*  23.7*   PLATELETS 10*3/mm3 437 468*  --   --  519*    < > = values in this interval not displayed.     Results from last 7 days   Lab Units 08/20/24  0537 08/19/24  0802 08/18/24  0759   SODIUM mmol/L 136 132* 132*   POTASSIUM mmol/L 3.8 3.7 3.7   CHLORIDE mmol/L 105 102 99   CO2 mmol/L 27.0 25.8 26.0   BUN mg/dL 12 14 14   CREATININE mg/dL 0.35* 0.43* 0.55*   CALCIUM mg/dL 8.3* 8.5* 8.7   BILIRUBIN mg/dL 0.2 <0.2 0.3   ALK PHOS U/L 86 90 88   ALT (SGPT) U/L 50* 50* 45*   AST (SGOT) U/L 50* 49* 44*   GLUCOSE mg/dL 93 123* 94     Results from last 7 days   Lab Units 08/17/24  0522   INR  1.52*   APTT seconds 36.1*         A/P: 79 y.o. male with sacral and ischial pressure wounds.  I discussed surgical debridement versus palliative care with the patient's daughter, Joaquina.  I explained to the procedure in " detail.  I explained all of the risks (bleeding, infection of the bone, need for further debridement, postoperative pain, and failure of wound to heal), benefits and alternatives.  I also discussed the option of consulting palliative care and focusing on the patient's comfort.  I also explained that I am very concerned that despite debridement these wounds will not heal.  She verbalized understanding and would like to proceed.  I will be coordinating with Dr. Duval with podiatry so that he can debride the patient's heel wound as well.      MATTY BARAJAS MD  General, Robotic and Endoscopic Surgery  [facility]    [address], [address]  [place], [place], [address]  P: [phone]  F: [phone]

## 2024-08-20 NOTE — ANESTHESIA PREPROCEDURE EVALUATION
Anesthesia Evaluation     Patient summary reviewed and Nursing notes reviewed   NPO Solid Status: > 8 hours  NPO Liquid Status: > 2 hours           Airway   Mallampati: III  TM distance: >3 FB  Neck ROM: full  Possible difficult intubation  Dental    (+) edentulous    Pulmonary - normal exam    breath sounds clear to auscultation  Cardiovascular - normal exam    ECG reviewed  Rhythm: regular  Rate: normal    (+) hypertension less than 2 medications, hyperlipidemia      Neuro/Psych  GI/Hepatic/Renal/Endo    (+) GERD, liver disease history of elevated LFT, renal disease-, thyroid problem hypothyroidism    ROS Comment: Has PEG    Musculoskeletal         ROS comment: DISH/ankylosing spondylitis/immobility syndrome/sacral ulcer  Abdominal  - normal exam   Substance History      OB/GYN          Other   arthritis, blood dyscrasia anemia,       Other Comment: Hgb 7.1                Anesthesia Plan    ASA 4     general     (May want CMAC for intubation on pt's bed)  intravenous induction     Anesthetic plan, risks, benefits, and alternatives have been provided, discussed and informed consent has been obtained with: patient.      CODE STATUS:    Code Status (Patient has no pulse and is not breathing): CPR (Attempt to Resuscitate)  Medical Interventions (Patient has pulse or is breathing): Full Support

## 2024-08-20 NOTE — ANESTHESIA POSTPROCEDURE EVALUATION
Patient: Anthony Gallegos    Procedure Summary       Date: 08/20/24 Room / Location: [facility] OR 07 / [facility] MAIN OR    Anesthesia Start: 1203 Anesthesia Stop: 1340    Procedures:       DEBRIDEMENT SACRAL ULCER/WOUND      LEFT DEBRIDEMENT FOOT (Left: Ankle) Diagnosis:       Wound of sacral region, initial encounter      (Wound of sacral region, initial encounter [S31.000A])    Surgeons: Justine Roque MD; Renny Duval DPM Provider: Corby Tamayo MD    Anesthesia Type: general ASA Status: 4            Anesthesia Type: general    Vitals  Vitals Value Taken Time   /55 08/20/24 1500   Temp 36.4 °C (97.5 °F) 08/20/24 1445   Pulse 72 08/20/24 1508   Resp 16 08/20/24 1500   SpO2 94 % 08/20/24 1508   Vitals shown include unfiled device data.        Anesthesia Post Evaluation

## 2024-08-20 NOTE — OP NOTE
Orthopedics DEBRIDEMENT SACRAL ULCER/WOUND  Op Note    Anthony Gallegos  8/20/2024    Pre-op Diagnosis:   Ulcer left heel with exposed bone  Cellulitis left heel    Post-op Diagnosis:  Same  Procedure   Incision and drainage complex debridement with depth to bone 19551  Wound vac application left heel    Anesthesia:  General    Staff:   Rosendaulator: Imani Galicia RN  Scrub Person: Philly Lynch  Assistant: Citlaly Ibarra CSA      Specimens: Culture left heel soft tissue and calcaneus      Drains:  wound vac    Procedure  Description    The patient was brought to the operating room. He was intubated and transferred to the operative table. He was placed in the lateral position. Propper padding of all extremities performed. The left lower leg was scrubbed prepped and draped in the usual sterile manner. Proper surgical timeout had been performed. This procedure was performed concurrently with Dr. Roque's treatment of the patients sacral wounds.     Attention was direction to the left posterior heel. A large ulcer with significant depth was present. There was a large amount of fibrous and necrotic tissue present to the base. This was excised. Incision was made through the necrotic tissue and there was noted depth of the wound to bone of the posterior calcaneus and achilles insertion. A rongeur was utilized to obtain a soft tissue and bone culture. The bone was notably soft consistent with possible chronic osteomyelitis. There was no noted abscess present. All clearly devitalized tissue was debrided and excised. Only bleeding tissue remained. The area was flushed with saline. Final wound measurement 6x5 cm.     A wound vac was then applied with good seal. ABD and kerlix applied.     The patient tolerated the procedure and was escorted from the operating room to the recovery room. He will require wound vac changed within 72 hours.       Complications:  None    Tourniquet:: none    Dressing:see  above    Disposition:stable    Renny Duval DPM     Date: 8/20/2024  Time: 14:44 EDT

## 2024-08-20 NOTE — CONSULTS
Podiatry Consult Note      Patient: Anthony Gallegos Admit Date: 08/17/2024    Age: 79 y.o.   PCP: Keshav Eason MD    MRN: [id number]  Room: [facility]        Subjective     Chief Complaint     Chief Complaint   Patient presents with    Abnormal Lab    Urinary Tract Infection        HPI     79M with PMHX of immobility, anemia,DISH, ankylosing spondylitis, G tube feeds, presents with multiple pressure wounds. He is currently in a long term facility. He is somnolent today provides minimal history.    Past Medical History     Past Medical History:   Diagnosis Date    Abscess of scrotum     MARTITA (acute kidney injury)     Altered mental state     Arthritis     AS (ankylosing spondylitis)     History of MRSA infection     Hypertension     Leukocytosis     Tetrahydrocannabinol (THC) use disorder, mild, abuse 12/20/2023    Uveitis         Past Surgical History:   Procedure Laterality Date    COLONOSCOPY      ENDOSCOPY W/ PEG TUBE PLACEMENT N/A 3/29/2024    Procedure: ESOPHAGOGASTRODUODENOSCOPY WITH PERCUTANEOUS ENDOSCOPIC GASTROSTOMY TUBE INSERTION;  Surgeon: Khadar Broderick MD;  Location: [facility] ENDOSCOPY;  Service: General;  Laterality: N/A;  PRE/POST - DYSPHAGIA    ROTATOR CUFF REPAIR Right     TOTAL HIP ARTHROPLASTY Left 2014        No Known Allergies     Social History     Tobacco Use   Smoking Status Never   Smokeless Tobacco Never        Objective   Physical Exam    Vitals:    08/20/24 0724   BP: 100/52   Pulse:    Resp: 14   Temp: 98.4 °F (36.9 °C)   SpO2: 98%        Dermatology: Skin thin atrophic. Large posterior heel ulcer with depth to achilles and calcaneus noted to the left heel. The is noted significant fibrous tissue and necrotic wound margins. There is periwound erythema. No streaking cellulitis. No abscess noted.     Vascular: DP and PT nonpalpable bilateral. Cap refill 3-5 seconds all digits bilateral. Absent hair growth to lower extremities    Neurological: Sensation grossly intact to light  touch    Musckuloskeletal: Active flexion and extension of his digits bilateral feet.    Labs     Lab Results   Component Value Date    HGBA1C 5.80 (H) 01/23/2024    POCGLU 101 08/20/2024    SEDRATE 57 (H) 12/20/2023        CBC:      Lab 08/20/24  0537 08/19/24  0802 08/18/24  1545 08/18/24  0759 08/17/24  1549 08/17/24  0522 08/16/24  2115   WBC 7.80 9.66  --  8.79  --  8.55 9.35   HEMOGLOBIN 7.1* 7.2*   < > 7.3*  7.3*   < > 7.5* 6.7*   HEMATOCRIT 23.1* 23.5*   < > 23.7*  23.7*   < > 23.7* 21.6*   PLATELETS 437 468*  --  519*  --  498* 470*   NEUTROS ABS 5.32 7.18*  --  6.41  --  5.39 6.44   IMMATURE GRANS (ABS) 0.04 0.05  --  0.04  --  0.04 0.02   LYMPHS ABS 1.18 1.16  --  1.19  --  1.72 1.50   MONOS ABS 0.88 1.03*  --  0.91*  --  1.01* 1.14*   EOS ABS 0.34 0.18  --  0.18  --  0.33 0.21   MCV 84.6 84.2  --  83.2  --  83.5 84.0    < > = values in this interval not displayed.          Results for orders placed or performed during the hospital encounter of 08/17/24   Blood Culture - Blood, Arm, Right    Specimen: Arm, Right; Blood   Result Value Ref Range    Blood Culture No growth at 2 days         CT Abdomen Pelvis With Contrast  Narrative: CT ABDOMEN PELVIS W CONTRAST-     INDICATIONS: Urinary tract infection, history of urine outflow  obstruction     TECHNIQUE: Radiation dose reduction techniques were utilized, including  automated exposure control and exposure modulation based on body size.  Enhanced ABDOMEN AND PELVIS CT     COMPARISON: 6/18/2024     FINDINGS:     Attenuation artifact from left hip arthroplasty hardware partly obscures  structures in the pelvis, and assessment for inflammatory changes in the  abdomen and pelvis is significantly limited by paucity of mesenteric  fat.     Right renal cyst is again demonstrated. No hydronephrosis. Urinary  bladder is catheterized, mostly empty, limiting its assessment.     Otherwise unremarkable appearance of the liver, spleen, adrenal glands,  pancreas,  kidneys, bladder.     No bowel obstruction. Moderate colonic fecal retention is present, with  suspected rectal stool impaction. Presacral edema raises suspicion for  proctitis. Correlate clinically. Percutaneous gastrostomy tube is in  place.     No free intraperitoneal gas. Minimal nonspecific pelvic free fluid.     A 1.2 cm short axis left para-aortic lymph node on axial image 54 is  mildly prominent, nonspecific, could be reactive in nature or  potentially evidence of neoplasm, but appears stable.     Abdominal aorta is not aneurysmal. Aortic and other arterial  calcifications are present.     The lung bases scarring and atelectasis in both lungs, with chronic  pleural-parenchymal change at the right anterior costophrenic angle.     Degenerative changes are seen in the spine. No acute fracture is  identified.           Impression:       1. Possible proctitis/rectal stool impaction, correlate clinically.  Minimal pelvic free fluid. Follow-up as indications persist.     2. No obstructive uropathy.     3. Mildly prominent nonspecific para-aortic lymph node.     This report was finalized on 8/19/2024 7:43 PM by Dr. Len Kennedy M.D on Workstation: [id number]          Assessment/Plan     79M with zafar grade 2/3 decubitus ulcer left heel.    -Patient with significant heel ulceration. Dressing changed today with betadine wet to dry. Remain in offloading bed boot at all times  -Patient with noted anemia hgb 7.1.   -There would be benefit to a surgical debridement of his left heel. Dr. Roque currently considering debridement of patients sacral wound. To limit trips to the operating room would consider following her debridement. This was discussed with patients daughter via phone. We discussed palliative wound care vs operative debridement and risks vs benefits. She would like to proceed with operative debridement.   -Difficult task to heal his heel ulcer given nutritional status and other comorbidity present.    -Xray left calcaneus was ordered, concern for possible chronic osteomyelitis of the calcaneus.   -Please contact with any questions thank you        Renny Duval DPM  [facility]  Office: [phone]

## 2024-08-20 NOTE — PROGRESS NOTES
A    Attempted to visit patient today but he was off the floor for surgical debridement of his sacral/ischial/heel wounds.  Chart reviewed.  Will follow-up with him this afternoon as time allows.  Otherwise we will see him tomorrow morning.    Sharif Logan MD

## 2024-08-20 NOTE — OP NOTE
Operative Note :  Justine Roque MD      Anthony Gallegos  1945    Procedure Date: 08/20/24    Pre-op Diagnosis:  Wound of sacral region, initial encounter [S31.000A]    Post-Operative Diagnosis:  Post-Op Diagnosis Codes:     * Wound of sacral region, initial encounter [S31.000A]    Procedure:   Sharp excisional debridement of sacral and ischial pressure ulcers    Surgeon: Justine Roque MD    Assistant: Citlaly Ibarra CSA (Citlaly was responsible for suctioning, retracting, and application of sterile dressings at the completion of the case)    Anesthesia:  General (general endotracheal tube)    Estimated Blood Loss: minimal    Specimens: Tissue sent for culture    Complications: None    Indications:  This is a very pleasant 79-year-old male who is immobile and has both sacral and ischial pressure wounds.  The wounds were evaluated and appear to need surgical debridement.  I discussed debridement with the patient and his daughter in detail.  All risk benefits alternatives were discussed with them before proceeding.  They verbalized understanding and wished to proceed.    Findings: Sacral pressure wound with significant amount of healthy granulation tissue.  This wound measured a total of 10 cm x 12 cm.  Left ischial wound with thick eschar and deep tissue injury down to the muscle.  Both wounds packed with Dakin soaked Kerlix.    Description of procedure:  The patient is taken operating where he underwent general endotracheal anesthesia without complication.  He was then positioned on the OR table in the right lateral decubitus position with all bony prominences padded.  His heel was prepped and draped for Dr. Duval and the sacrum and ischial wounds were prepped and draped with Betadine.  A timeout was performed, confirming the patient, procedure and preoperative antibiotics.  I began by sharply excising the ischial wound which had a thick eschar and deep tissue injury down to the muscle.  The skin, subcutaneous tissue and  muscle was excised sharply using Bovie electrocautery.  The tissue debrided measured 25 cm².  The underlying tissue was inspected for hemostasis.  Next, I sharply excised a small amount of fibrinous tissue in the middle of the sacral wound directly overlying the sacrum.  There was also fibrinous tissue around the edge of the wound that was also sharply excised.  Total excised tear was about 4 cm².  I cleaned the wound and ensured hemostasis.  Both wounds were packed with Dakin soaked Kerlix.  After completion, the patient was returned to his stretcher and extubated and transported to recovery in stable condition.  All sponge, needle and instrument counts were correct at the conclusion the procedure.    Recommendations:  Resume wet-to-dry dressings to ischial and sacral wounds.  We can evaluate these in a few days for appropriateness for wound VAC placement.          MATTY BARAJAS MD  General, Robotic, and Endoscopic Surgery  [facility]    [address], [address]  [place] [address]  P: [phone]  F: [phone]

## 2024-08-20 NOTE — BRIEF OP NOTE
DEBRIDEMENT FOOT  Progress Note    Anthony Gallegos  8/20/2024    Pre-op Diagnosis:     Left heel ulcer zafar grade 2/3.   Cellulitis left foot         Post op Diagnosis:    Left heel ulcer zafar grade 2/3  Cellulitis left foot    Procedure/CPT® Codes:  Incision and drainage to bone cortex 54188  Wound vac application left foot      Procedure(s):    LEFT FOOT DEBRIDEMENT               Surgeon(s):  Justine Roque MD Vogt, Jordan H, DPM    Anesthesia: General    Staff:   Circulator: Imani Galicia RN  Scrub Person: Phlily Lynch         Estimated Blood Loss: 50 mL    Urine Voided: * No values recorded between 8/20/2024 12:02 PM and 8/20/2024  1:19 PM *    Specimens:                Specimens       ID Source Type Tests Collected By Collected At Frozen?    1 Sacrum Tissue TISSUE / BONE CULTURE   Justine Roque MD 8/20/24 1312     Description: sacral wound culture    This specimen was not marked as sent.    2 Foot, Left Tissue TISSUE / BONE CULTURE   Justine Roque MD 8/20/24 1313     Description: left heel wound culture    This specimen was not marked as sent.                  Drains:   Gastrostomy/Enterostomy Percutaneous endoscopic gastrostomy (PEG) 20 Fr. LUQ (Active)   Surrounding Skin Intact;Dry 08/20/24 0021   Drain Status Clamped 08/20/24 0021   Gastrostomy/Enterostomy Site Interventions Site assessed 08/20/24 0021   Dressing Status Clean;Dry;Intact 08/20/24 0021   Dressing Intervention Dressing changed 08/19/24 2020   Dressing Type Split gauze 08/20/24 0021   Tube Feeding Frequency Continuous 08/19/24 2020   Tube Feeding Product Nutren 2 nick 08/19/24 2020   Tube Feeding Strength Full strength 08/19/24 2020   Tube Feeding Method Other (Comment) 08/20/24 0021   Tube Feeding Rate (mL/hr) 45 mL/HR 08/19/24 2020   Tube Feeding Bag Changed Yes 08/19/24 1830   Tube Feeding Residual (mL) 5 mL 08/19/24 1830   Tube Feeding Residual Returned (mL) 5 mL 08/19/24 1830   Feeding Tube Flushed With Tap water 08/20/24  0021   Flush/ Irrigation Intake (mL) 30 08/20/24 0021   Tube Feeding Intake (mL) 254 08/20/24 0021       Urethral Catheter Straight-tip 16 Fr. (Active)   Daily Indications Chronic Urinary Retention related to Obstructive, Infectious/Inflammatory, Neurologic or Traumatic Causes 08/20/24 0021   Site Assessment Clean;Skin intact 08/20/24 0021   Collection Container Standard drainage bag 08/20/24 0912   Securement Method Securing device 08/20/24 0912   Catheter care complete Yes 08/20/24 0912   Irrigation Ease no resistance met 08/17/24 0625   Output (mL) 500 mL 08/20/24 0503       [REMOVED] Urethral Catheter 16 Fr. (Removed)       Findings: See detailed op noted. Left heel ulcer with depth to calcaneus and achilles tendon insertion. Soft tissue and bone culture obtained. Wound vac applied. Necrosis of soft tissues but no gross purulence present        Complications: none          Renny Duval DPM     Date: 8/20/2024  Time: 13:25 EDT

## 2024-08-20 NOTE — CONSULTS
General Surgery Consultation    Consulting Physician: Justine Roque Md    Reason for consultation: Sacral and ischial pressure wound    CC: Immobility    HPI:   The patient is a very pleasant 79 y.o. male with immobility due to weakness who lives in a facility and has worsening pressure wounds on the sacrum and left ischium.  He has had a feeding tube placed earlier this year in hopes of improving his nutrition, but despite this he has failed to improve significantly.    Past Medical History:  Past Medical History:   Diagnosis Date    Abscess of scrotum     MARTITA (acute kidney injury)     Altered mental state     Arthritis     AS (ankylosing spondylitis)     History of MRSA infection     Hypertension     Leukocytosis     Tetrahydrocannabinol (THC) use disorder, mild, abuse 12/20/2023    Uveitis        Past Surgical History:  Past Surgical History:   Procedure Laterality Date    COLONOSCOPY      ENDOSCOPY W/ PEG TUBE PLACEMENT N/A 3/29/2024    Procedure: ESOPHAGOGASTRODUODENOSCOPY WITH PERCUTANEOUS ENDOSCOPIC GASTROSTOMY TUBE INSERTION;  Surgeon: Khadar Broderick MD;  Location: [facility] ENDOSCOPY;  Service: General;  Laterality: N/A;  PRE/POST - DYSPHAGIA    ROTATOR CUFF REPAIR Right     TOTAL HIP ARTHROPLASTY Left 2014       Medications:  Medications Prior to Admission   Medication Sig Dispense Refill Last Dose    acetaminophen (TYLENOL) 325 MG tablet Take 2 tablets by mouth Every 4 (Four) Hours As Needed for Mild Pain.   8/17/2024    Ascorbic Acid (VITAMIN C PO) Daily.   8/16/2024    atorvastatin (LIPITOR) 40 MG tablet    8/16/2024    bisacodyl (DULCOLAX) 10 MG suppository Insert 1 suppository into the rectum Daily As Needed for Constipation (Use if bisacodyl oral is ineffective).   Past Month    bisacodyl (DULCOLAX) 5 MG EC tablet Take 1 tablet by mouth Daily As Needed for Constipation (Use if polyethylene glycol is ineffective).   Past Week    calcium carbonate (TUMS) 500 MG chewable tablet Chew 2 tablets 2  (Two) Times a Day As Needed for Heartburn.   8/16/2024    cyclobenzaprine (FLEXERIL) 10 MG tablet TAKE 0.5   1 TABLET BY ORAL ROUTE AT BEDTIME AS NEEDED   8/16/2024    Effer-K 20 MEQ effervescent tablet    8/16/2024    HYDROcodone-acetaminophen (NORCO) 5-325 MG per tablet    Past Month    lansoprazole (PREVACID SOLUTAB) 30 MG Tablet Delayed Release Dispersible disintegrating tablet Administer 1 tablet per G tube Every Morning.   8/16/2024    melatonin 3 MG tablet Take 1 tablet by mouth At Night As Needed for Sleep.   8/16/2024    Menthol-Zinc Oxide 0.44-20.6 % ointment Apply 1 Application topically to the appropriate area as directed Every 12 (Twelve) Hours.   8/16/2024    methotrexate 2.5 MG tablet Take 1 tablet by mouth 1 (One) Time Per Week. Take 2.5 mg on Wednesday and 2.5 mg on Thursday.  Do not take any medication on Friday through Tuesday.   8/16/2024    mupirocin (BACTROBAN) 2 % ointment Apply 1 Application topically to the appropriate area as directed Every 12 (Twelve) Hours. Apply to Penis Abrasion for 3 more days   8/16/2024    Omega-3 Fatty Acids (OMEGA 3 PO) Take 1 capsule by mouth Daily.   8/16/2024    potassium chloride (KAYCIEL) 20 mEq/15 mL solution Take 15 mL by mouth Daily.   8/16/2024    sennosides-docusate (PERICOLACE) 8.6-50 MG per tablet Take 2 tablets by mouth 2 (Two) Times a Day As Needed for Constipation.   Past Week    terazosin (HYTRIN) 1 MG capsule Administer 1 capsule per G tube Every Night.   8/16/2024    castor oil-balsam peru (VENELEX) ointment Apply 1 Application topically to the appropriate area as directed Every 12 (Twelve) Hours. Apply to foot, heel, and ankle wounds per wound care order.   Unknown    polyethylene glycol (MIRALAX) 17 g packet Take 17 g by mouth Daily As Needed (Use if senna-docusate is ineffective).   More than a month    VITAMIN D PO Daily.   More than a month       Allergies: No Known Allergies    Social History:     Social History     Socioeconomic History     Marital status:     Number of children: 2   Tobacco Use    Smoking status: Never    Smokeless tobacco: Never   Vaping Use    Vaping status: Never Used   Substance and Sexual Activity    Alcohol use: No    Drug use: No    Sexual activity: Defer       Family History:     Family History   Problem Relation Age of Onset    Alzheimer's disease Mother     Colon cancer Neg Hx     Colon polyps Neg Hx     Crohn's disease Neg Hx     Irritable bowel syndrome Neg Hx     Ulcerative colitis Neg Hx        Review of Systems:  Constitutional: denies any weight changes, fatigue, or weakness  Eyes: denies blurred/double vision or scleral icterus  Cardiovascular: denies chest pain, palpitations, or edemas  Respiratory: denies cough, sputum, or dyspnea  Gastrointestinal:   Denies abdominal pain, nausea or vomiting  Genitourinary: denies dysuria or hematuria  Endocrine: denies cold intolerance, lethargy, or flushing  Hematologic: denies excessive bruising or bleeding  Musculoskeletal: denies weakness, joint swelling, joint pain, or stiffness  Neurologic: denies seizures, CVA, paresthesia, or peripheral neuropathy  Skin: denies change in nevi, rashes, masses, or jaundice     All other systems reviewed and were negative.    Physical Exam:   Vitals:    08/19/24 1953   BP: 122/62   Pulse:    Resp: 16   Temp: 99.1 °F (37.3 °C)   SpO2: 96%     BMI: 19  GENERAL: awake and alert, cachectic, ill-appearing, no acute distress, oriented to person, place, and time  HEENT: Temporal wasting, atraumatic, no scleral icterus, moist mucous membranes  NECK: Supple, there is no thyromegaly or lymphadenopathy  RESPIRATORY: clear to auscultation, no wheezes, rales or rhonchi, symmetric air entry  CARDIOVASCULAR: regular rate and rhythm    GASTROINTESTINAL: Soft, nondistended  MUSCULOSKELETAL: no cyanosis, clubbing, or edema   NEUROLOGIC: alert and oriented, normal speech, cranial nerves 2-12 grossly intact, no focal deficits   SKIN: The ischial wound has  a black eschar and foul smell, the sacral wound has some good granulation tissue but also has some fibrinous material      Diagnostic work-up:     Pertinent labs I personally reviewed:   Results from last 7 days   Lab Units 08/19/24  0802 08/18/24  1545 08/18/24  0759 08/17/24  1549 08/17/24  0522 08/16/24  2115   WBC 10*3/mm3 9.66  --  8.79  --  8.55 9.35   HEMOGLOBIN g/dL 7.2*   < > 7.3*  7.3*   < > 7.5* 6.7*   HEMATOCRIT % 23.5*   < > 23.7*  23.7*   < > 23.7* 21.6*   PLATELETS 10*3/mm3 468*  --  519*  --  498* 470*    < > = values in this interval not displayed.     Results from last 7 days   Lab Units 08/19/24  0802 08/18/24  0759 08/17/24  0522   SODIUM mmol/L 132* 132* 134*   POTASSIUM mmol/L 3.7 3.7 4.1   CHLORIDE mmol/L 102 99 101   CO2 mmol/L 25.8 26.0 27.5   BUN mg/dL 14 14 12   CREATININE mg/dL 0.43* 0.55* 0.54*   CALCIUM mg/dL 8.5* 8.7 8.9   BILIRUBIN mg/dL <0.2 0.3 0.3   ALK PHOS U/L 90 88 85   ALT (SGPT) U/L 50* 45* 61*   AST (SGOT) U/L 49* 44* 62*   GLUCOSE mg/dL 123* 94 97       Imaging:  None    Assessment and plan:   The patient is a 79 y.o. male with sacral and ischial pressure wounds.  The patient answered all questions appropriately and stated that he would be agreeable to surgical debridement if it was necessary, but he also wanted me to discuss this with his daughter.  I will go ahead and put him on the schedule for tomorrow and call his daughter first thing in the morning to discuss the surgery in detail and see if they would like to proceed.      Justine Roque MD  General, Robotic, and Endoscopic Surgery  [facility], [place], operated by [facility]     [address] [place] [address], [address]  [place], [place], [address]  P: [phone]  F: [phone]

## 2024-08-20 NOTE — ANESTHESIA PROCEDURE NOTES
Airway  Urgency: elective    Date/Time: 8/20/2024 12:13 PM  Airway not difficult    General Information and Staff    Patient location during procedure: OR  Anesthesiologist: Corby Tamayo MD  CRNA/CAA: Mindy Bear CRNA    Indications and Patient Condition  Indications for airway management: airway protection    Preoxygenated: yes  Mask difficulty assessment: 0 - not attempted    Final Airway Details  Final airway type: endotracheal airway      Successful airway: ETT  Cuffed: yes   Successful intubation technique: video laryngoscopy  Facilitating devices/methods: intubating stylet  Endotracheal tube insertion site: oral  Blade: CMAC  Blade size: D  ETT size (mm): 7.5  Cormack-Lehane Classification: grade I - full view of glottis  Placement verified by: chest auscultation and capnometry   Measured from: lips  ETT/EBT  to lips (cm): 22  Number of attempts at approach: 1  Assessment: lips, teeth, and gum same as pre-op and atraumatic intubation    Additional Comments  Extreme cervical stenosis. Recommend CMAC for intubations.

## 2024-08-20 NOTE — PAYOR COMM NOTE
"Anthony Gallegos (79 y.o. Male)        PLEASE SEE ATTACHED FOR INPT AUTH.     REF#[id number]    PLEASE CALL   OR  [phone]    THANK YOU    ANAND SHRESTHA LPN CCP   Date of Birth   1945    Social Security Number       Address   [address] [place], [place] [address]    Home Phone   [phone]    MRN   [id number]       Confucianist   [facility]    Marital Status                               Admission Date   8/17/24    Admission Type   Emergency    Admitting Provider   Luther Rojas MD    Attending Provider   Sharif Logan MD    Department, Room/Bed   [facility] MAIN OR, [place] Main OR/MAIN OR       Discharge Date       Discharge Disposition       Discharge Destination                                 Attending Provider: Sharif Logan MD    Allergies: No Known Allergies    Isolation: Contact   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), ESBL E coli (06/19/24), Ankita Auris (rule out) (08/17/24)   Code Status: CPR    Ht: 190.5 cm (75\")   Wt: 72.4 kg (159 lb 9.8 oz)    Admission Cmt: None   Principal Problem: Acute on chronic anemia [D64.9]                   Active Insurance as of 8/17/2024       Primary Coverage       Payor Plan Insurance Group Employer/Plan Group    AETNA MEDICARE REPLACEMENT AETNA MED ADV POS [id number]       Payor Plan Address Payor Plan Phone Number Payor Plan Fax Number Effective Dates    [address] [phone]  12/1/2023 - None Entered    [facility] [id number]         Subscriber Name Subscriber Birth Date Member ID       ANTHONY GALLEGOS 1945 [id number]                     Emergency Contacts        (Rel.) Home Phone Work Phone Mobile Phone    NATHALIE DON (Daughter) [phone] -- [phone]    MAGY GALLEGOS (Son) [phone] -- [phone]              [place]: [facility] [id number]  Tax ID [id number]     History & Physical        Luther Rojas MD at 08/17/24 1443        "       Patient Name:  Anthony Gallegos  YOB: 1945  MRN:  [id number]  Admit Date:  8/17/2024  Patient Care Team:  Keshav Eason MD as PCP - General (Internal Medicine)  Placido Shirley APRN as Referring Physician (Gastroenterology)  Rick Quarles MD as Consulting Physician (Hematology and Oncology)        Chief complaint.  Sent from a nursing facility secondary to low hemoglobin.    History Present Illness     A pleasant 79 years old gentleman with a past history of ankylosing spondylitis/hypertension/benign prostatic hypertrophy with urine outflow obstruction on indwelling Mariscal catheter/DISH/mortality/dysphagia/GERD/malnutrition/G-tube feeds/dyslipidemia who was recently diagnosed with iron deficiency anemia by hematology oncology and was supposed to be scheduled on IV iron.  Patient baseline hemoglobin is between 8 and 9.  Repeat CBC in the nursing home was 6.7 and the patient was sent for further evaluation.  Patient denies any abdominal pain.  No nausea or vomiting.  He reports no diarrhea or constipation.  No bleeding per rectum or melena.  No bleeding diathesis.  He is not on any anticoagulation.  He denies any new weakness or fatigue.  No dizziness or loss of consciousness.  No focal neurological symptoms.  In the emergency department her CBC was normal except a hemoglobin of 7.5 and platelets of 498.  INR was 1.5.  PTT was 36.1.  CMP normal except sodium of 134, albumin 2.6, ALT 61, AST 62.  UA suggestive of UTI (from a new Mariscal catheter.  Patient was subsequently admitted        Review of Systems   Cardio vascular/respiratory.  No chest pain.  Positive occasional dry cough.  No shortness of breath.  No palpitation.  No ankle edema.  .  Cloudy urine in the Mariscal catheter.  No blood in the Mariscal catheter.    Personal History     Past Medical History:   Diagnosis Date    Abscess of scrotum     MARTITA (acute kidney injury)     Altered mental state     Arthritis     AS (ankylosing  spondylitis)     History of MRSA infection     Hypertension     Leukocytosis     Tetrahydrocannabinol (THC) use disorder, mild, abuse 12/20/2023    Uveitis      Past Surgical History:   Procedure Laterality Date    COLONOSCOPY      ENDOSCOPY W/ PEG TUBE PLACEMENT N/A 3/29/2024    Procedure: ESOPHAGOGASTRODUODENOSCOPY WITH PERCUTANEOUS ENDOSCOPIC GASTROSTOMY TUBE INSERTION;  Surgeon: Khadar Broderick MD;  Location: [facility] ENDOSCOPY;  Service: General;  Laterality: N/A;  PRE/POST - DYSPHAGIA    ROTATOR CUFF REPAIR Right     TOTAL HIP ARTHROPLASTY Left 2014     Family History   Problem Relation Age of Onset    Alzheimer's disease Mother     Colon cancer Neg Hx     Colon polyps Neg Hx     Crohn's disease Neg Hx     Irritable bowel syndrome Neg Hx     Ulcerative colitis Neg Hx      Social History     Tobacco Use    Smoking status: Never    Smokeless tobacco: Never   Vaping Use    Vaping status: Never Used   Substance Use Topics    Alcohol use: No    Drug use: No     No current facility-administered medications on file prior to encounter.     Current Outpatient Medications on File Prior to Encounter   Medication Sig Dispense Refill    acetaminophen (TYLENOL) 325 MG tablet Take 2 tablets by mouth Every 4 (Four) Hours As Needed for Mild Pain.      Ascorbic Acid (VITAMIN C PO) Daily.      atorvastatin (LIPITOR) 40 MG tablet       bisacodyl (DULCOLAX) 10 MG suppository Insert 1 suppository into the rectum Daily As Needed for Constipation (Use if bisacodyl oral is ineffective).      bisacodyl (DULCOLAX) 5 MG EC tablet Take 1 tablet by mouth Daily As Needed for Constipation (Use if polyethylene glycol is ineffective).      calcium carbonate (TUMS) 500 MG chewable tablet Chew 2 tablets 2 (Two) Times a Day As Needed for Heartburn.      cyclobenzaprine (FLEXERIL) 10 MG tablet TAKE 0.5   1 TABLET BY ORAL ROUTE AT BEDTIME AS NEEDED      Effer-K 20 MEQ effervescent tablet       HYDROcodone-acetaminophen (NORCO) 5-325 MG per  tablet       lansoprazole (PREVACID SOLUTAB) 30 MG Tablet Delayed Release Dispersible disintegrating tablet Administer 1 tablet per G tube Every Morning.      melatonin 3 MG tablet Take 1 tablet by mouth At Night As Needed for Sleep.      Menthol-Zinc Oxide 0.44-20.6 % ointment Apply 1 Application topically to the appropriate area as directed Every 12 (Twelve) Hours.      methotrexate 2.5 MG tablet Take 1 tablet by mouth 1 (One) Time Per Week. Take 2.5 mg on Wednesday and 2.5 mg on Thursday.  Do not take any medication on Friday through Tuesday.      mupirocin (BACTROBAN) 2 % ointment Apply 1 Application topically to the appropriate area as directed Every 12 (Twelve) Hours. Apply to Penis Abrasion for 3 more days      Omega-3 Fatty Acids (OMEGA 3 PO) Take 1 capsule by mouth Daily.      potassium chloride (KAYCIEL) 20 mEq/15 mL solution Take 15 mL by mouth Daily.      sennosides-docusate (PERICOLACE) 8.6-50 MG per tablet Take 2 tablets by mouth 2 (Two) Times a Day As Needed for Constipation.      terazosin (HYTRIN) 1 MG capsule Administer 1 capsule per G tube Every Night.      castor oil-balsam peru (VENELEX) ointment Apply 1 Application topically to the appropriate area as directed Every 12 (Twelve) Hours. Apply to foot, heel, and ankle wounds per wound care order.      polyethylene glycol (MIRALAX) 17 g packet Take 17 g by mouth Daily As Needed (Use if senna-docusate is ineffective).      VITAMIN D PO Daily.       No Known Allergies    Objective   Objective     Vital Signs  Temp:  [97.5 °F (36.4 °C)-98.2 °F (36.8 °C)] 98.2 °F (36.8 °C)  Heart Rate:  [72-78] 75  Resp:  [18-20] 20  BP: (113-124)/(53-68) 113/53  SpO2:  [100 %] 100 %  on   ;   Device (Oxygen Therapy): room air  Body mass index is 17.53 kg/m².    Physical Exam  General.  Elderly gentleman.  He is alert and oriented x 4.  In no apparent pain/distress/diaphoresis.  Normal mood and affect.  Eyes.  Pupils equal round and reactive.  Intact extraocular  musculature.  No pallor or jaundice.  Positive discharge from the right eye.    Oral cavity.  Mucous membrane  Neck.  Stiff (old) no JVD.  No lymphadenopathy or thyromegaly.  Cardiovascular.  Regular rate and rhythm and grade 2 systolic murmur.  Chest.  Clear to auscultation bilaterally with no added sounds.  Abdomen.  Soft lax.  G-tube in place.  No organomegaly.  No guarding or rebound.  .  No CVA tenderness.  Mariscal catheter in place with clear urine.  CNS.  No acute focal neurological deficits.  Extremities.  No clubbing/cyanosis/edema.  Redness left foot    Results Review:  I reviewed the patient's new clinical results.  I reviewed the patient's new imaging results and agree with the interpretation.  I reviewed the patient's other test results and agree with the interpretation  I personally viewed and interpreted the patient's EKG/Telemetry data  Discussed with ED provider.    Lab Results (last 24 hours)       Procedure Component Value Units Date/Time    CBC & Differential [[id number]]  (Abnormal) Collected: 08/16/24 2115    Specimen: Blood Updated: 08/17/24 0157    Narrative:      The following orders were created for panel order CBC & Differential.  Procedure                               Abnormality         Status                     ---------                               -----------         ------                     CBC Auto Differential[[id number]]        Abnormal            Final result                 Please view results for these tests on the individual orders.    CBC Auto Differential [[id number]]  (Abnormal) Collected: 08/16/24 2115    Specimen: Blood Updated: 08/17/24 0157     WBC 9.35 10*3/mm3      RBC 2.57 10*6/mm3      Hemoglobin 6.7 g/dL      Hematocrit 21.6 %      MCV 84.0 fL      MCH 26.1 pg      MCHC 31.0 g/dL      RDW 14.8 %      RDW-SD 44.8 fl      MPV 8.6 fL      Platelets 470 10*3/mm3      Neutrophil % 69.0 %      Lymphocyte % 16.0 %      Monocyte % 12.2 %      Eosinophil % 2.2 %       Basophil % 0.4 %      Immature Grans % 0.2 %      Neutrophils, Absolute 6.44 10*3/mm3      Lymphocytes, Absolute 1.50 10*3/mm3      Monocytes, Absolute 1.14 10*3/mm3      Eosinophils, Absolute 0.21 10*3/mm3      Basophils, Absolute 0.04 10*3/mm3      Immature Grans, Absolute 0.02 10*3/mm3      nRBC 0.0 /100 WBC     POCT Occult Blood Stool [[id number]] Collected: 08/17/24 0504    Specimen: Stool from Per Rectum Updated: 08/17/24 0505     Fecal Occult Blood Negative     Lot Number [id number]     Expiration Date 2-     Positive Control Positive     Negative Control Negative    CBC & Differential [[id number]]  (Abnormal) Collected: 08/17/24 0522    Specimen: Blood Updated: 08/17/24 0534    Narrative:      The following orders were created for panel order CBC & Differential.  Procedure                               Abnormality         Status                     ---------                               -----------         ------                     CBC Auto Differential[[id number]]        Abnormal            Final result                 Please view results for these tests on the individual orders.    Comprehensive Metabolic Panel [[id number]]  (Abnormal) Collected: 08/17/24 0522    Specimen: Blood Updated: 08/17/24 0554     Glucose 97 mg/dL      BUN 12 mg/dL      Creatinine 0.54 mg/dL      Sodium 134 mmol/L      Potassium 4.1 mmol/L      Chloride 101 mmol/L      CO2 27.5 mmol/L      Calcium 8.9 mg/dL      Total Protein 8.2 g/dL      Albumin 2.6 g/dL      ALT (SGPT) 61 U/L      AST (SGOT) 62 U/L      Alkaline Phosphatase 85 U/L      Total Bilirubin 0.3 mg/dL      Globulin 5.6 gm/dL      A/G Ratio 0.5 g/dL      BUN/Creatinine Ratio 22.2     Anion Gap 5.5 mmol/L      eGFR 100.7 mL/min/1.73     Narrative:      GFR Normal >60  Chronic Kidney Disease <60  Kidney Failure <15    The GFR formula is only valid for adults with stable renal function between ages 18 and 70.    aPTT [[id number]]  (Abnormal) Collected: 08/17/24 0522     Specimen: Blood Updated: 08/17/24 0652     PTT 36.1 seconds     Protime-INR [[id number]]  (Abnormal) Collected: 08/17/24 0522    Specimen: Blood Updated: 08/17/24 0652     Protime 18.6 Seconds      INR 1.52    CBC Auto Differential [[id number]]  (Abnormal) Collected: 08/17/24 0522    Specimen: Blood Updated: 08/17/24 0534     WBC 8.55 10*3/mm3      RBC 2.84 10*6/mm3      Hemoglobin 7.5 g/dL      Hematocrit 23.7 %      MCV 83.5 fL      MCH 26.4 pg      MCHC 31.6 g/dL      RDW 14.6 %      RDW-SD 43.9 fl      MPV 8.2 fL      Platelets 498 10*3/mm3      Neutrophil % 63.0 %      Lymphocyte % 20.1 %      Monocyte % 11.8 %      Eosinophil % 3.9 %      Basophil % 0.7 %      Immature Grans % 0.5 %      Neutrophils, Absolute 5.39 10*3/mm3      Lymphocytes, Absolute 1.72 10*3/mm3      Monocytes, Absolute 1.01 10*3/mm3      Eosinophils, Absolute 0.33 10*3/mm3      Basophils, Absolute 0.06 10*3/mm3      Immature Grans, Absolute 0.04 10*3/mm3      nRBC 0.0 /100 WBC     Iron Profile [[id number]]  (Abnormal) Collected: 08/17/24 0522    Specimen: Blood Updated: 08/17/24 0616     Iron 15 mcg/dL      Iron Saturation (TSAT) 8 %      Transferrin 121 mg/dL      TIBC 180 mcg/dL     Urinalysis With Culture If Indicated - Urine, Catheter [[id number]]  (Abnormal) Collected: 08/17/24 0536    Specimen: Urine, Catheter Updated: 08/17/24 0611     Color, UA Yellow     Appearance, UA Turbid     pH, UA 7.0     Specific Gravity, UA 1.018     Glucose, UA Negative     Ketones, UA Negative     Bilirubin, UA Negative     Blood, UA Small (1+)     Protein,  mg/dL (2+)     Leuk Esterase, UA Large (3+)     Nitrite, UA Positive     Urobilinogen, UA 1.0 E.U./dL    Narrative:      In absence of clinical symptoms, the presence of pyuria, bacteria, and/or nitrites on the urinalysis result does not correlate with infection.    Urinalysis, Microscopic Only - Urine, Catheter [[id number]]  (Abnormal) Collected: 08/17/24 0536    Specimen: Urine, Catheter Updated:  08/17/24 0704     RBC, UA 3-5 /HPF      WBC, UA Too Numerous to Count /HPF      Bacteria, UA 2+ /HPF      Squamous Epithelial Cells, UA None Seen /HPF      Hyaline Casts, UA None Seen /LPF      Methodology Manual Light Microscopy    Urine Culture - Urine, Urine, Catheter [[id number]] Collected: 08/17/24 0536    Specimen: Urine, Catheter Updated: 08/17/24 0704    CANDIDA AURIS SCREEN - Swab, Axilla Right, Axilla Left and Groin [[id number]] Collected: 08/17/24 1348    Specimen: Swab from Axilla Right, Axilla Left and Groin Updated: 08/17/24 1413            Imaging Results (Last 24 Hours)       ** No results found for the last 24 hours. **                Telemetry Scan   Final Result               Assessment/Plan     Active Hospital Problems    Diagnosis  POA    **Acute on chronic anemia [D64.9]  Yes      Resolved Hospital Problems   No resolved problems to display.           Acute on chronic iron deficiency anemia.  Will fully workup the anemia.  Hemoglobin is worse than baseline.  Will check Hemoccult stool.  Will consult hematology oncology and GI.  Hold on transfusion unless hematology oncology believes it is needed.  Monitor H&H.  Continue proton pump inhibitor.  Elevated liver function test.  Benign GI examination.  Could be secondary to medication.  Will check CT scan of the abdomen and hepatitis panel.  Monitor liver function test.  Hypertension.  Good control on no medications.  No evidence of angina or congestive heart failure.  Will monitor.  Ankylosing spondylitis.  Continue methotrexate.  Elevated TSH recently.  Repeat full thyroid function test.  Not on any thyroid replacement.  Dysphagia/GERD/malnutrition.  Speech recommends puréed and honey thickened liquids.  Nutrition G-tube feeds.  Continue proton pump inhibitors.  Dyslipidemia.  Continue Lipitor.  Left foot wound.  Consult wound nurse.  VTE prophylaxis.  Sequential compression device.      Discussed.  My findings and plan of treatment with the  patient/family/ER provider.  Disposition.  Eventually back to skilled facility once medically stable.    Code Status -full code.       Luther Rojas MD  [facility]  08/17/24  14:43 EDT     Electronically signed by Luther Rojas MD at 08/17/24 1455          Emergency Department Notes        Teddy Tanner RN at 08/17/24 0649            Tom Noel MD at 08/17/24 0448           EMERGENCY DEPARTMENT ENCOUNTER  Room Number:  09/09  PCP: Keshav Eason MD  Independent Historians: Patient and EMS report      HPI:  Chief Complaint: had concerns including Abnormal Lab and Urinary Tract Infection.     A complete HPI/ROS/PMH/PSH/SH/FH are unobtainable due to: None    Chronic or social conditions impacting patient care (Social Determinants of Health): None      Context: Anthony Gallegos is a 79 y.o. male with a medical history of ankylosing spondylitis, hypertension, and BPH with chronically indwelling Mariscal who presents to the ED c/o acute anemia and concern for debris in urinary catheter.  Patient complains of unchanged chronic back pain.  No clear exacerbating relieving factors identified.  Patient also complains of being cold.    We reviewed paperwork that accompanies the patient reveals that he had a hemoglobin greater than 8 two days ago and then it dropped to mid 6 yesterday.  Patient unaware of any black or bloody stools.    Review of prior external notes (non-ED) -and- Review of prior external test results outside of this encounter:  Hospital discharge summary 6/27/2024 reviewed: Patient admitted treated for ESBL E. coli UTI and bacteremia      PAST MEDICAL HISTORY  Active Ambulatory Problems     Diagnosis Date Noted    Ankylosing spondylitis of cervical region 06/29/2017    Recent unexplained weight loss 07/14/2017    Abnormal serum lipase level 08/10/2017    Abnormal serum level of amylase 08/10/2017    Hypertension 10/19/2017    Boil 03/22/2018    Immobility 12/20/2023    Fall from  bed 12/20/2023    Tetrahydrocannabinol (THC) use disorder, mild, abuse 12/20/2023    Generalized pain 12/20/2023     12/20/2023    Grief 12/20/2023    DISH (disseminated idiopathic skeletal hyperostosis) 12/20/2023    Generalized weakness 12/21/2023    Severe malnutrition 12/24/2023    Altered mental status 01/21/2024    Transient alteration of awareness 01/22/2024    GERD without esophagitis 01/22/2024    BPH (benign prostatic hyperplasia) 01/22/2024    Urinary tract infection associated with indwelling urethral catheter 01/22/2024    Hyperglycemia 01/22/2024    Decreased oral intake 03/25/2024    Dysphagia 03/25/2024    Failure to thrive in adult 04/09/2024    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    Renal failure 06/18/2024    UTI (urinary tract infection), bacterial 06/18/2024    Anemia 06/27/2024     Resolved Ambulatory Problems     Diagnosis Date Noted    Urine retention 12/20/2023    Constipation 12/20/2023    Leukocytosis 01/22/2024    Dehydration 01/22/2024    MARTITA (acute kidney injury) 01/22/2024    Altered mental state 01/22/2024    Encephalopathy 03/26/2024    ARF (acute renal failure) 06/18/2024     Past Medical History:   Diagnosis Date    Abscess of scrotum     Arthritis     AS (ankylosing spondylitis)     History of MRSA infection     Uveitis          PAST SURGICAL HISTORY  Past Surgical History:   Procedure Laterality Date    COLONOSCOPY      ENDOSCOPY W/ PEG TUBE PLACEMENT N/A 3/29/2024    Procedure: ESOPHAGOGASTRODUODENOSCOPY WITH PERCUTANEOUS ENDOSCOPIC GASTROSTOMY TUBE INSERTION;  Surgeon: Khaadr Brdoerick MD;  Location: [facility] ENDOSCOPY;  Service: General;  Laterality: N/A;  PRE/POST - DYSPHAGIA    ROTATOR CUFF REPAIR Right     TOTAL HIP ARTHROPLASTY Left 2014         FAMILY HISTORY  Family History   Problem Relation Age of Onset    Alzheimer's disease Mother     Colon cancer Neg Hx     Colon polyps Neg Hx     Crohn's disease Neg Hx     Irritable bowel syndrome Neg Hx      Ulcerative colitis Neg Hx          SOCIAL HISTORY  Social History     Socioeconomic History    Marital status:     Number of children: 2   Tobacco Use    Smoking status: Never    Smokeless tobacco: Never   Vaping Use    Vaping status: Never Used   Substance and Sexual Activity    Alcohol use: No    Drug use: No    Sexual activity: Defer         ALLERGIES  Patient has no known allergies.      REVIEW OF SYSTEMS  Review of Systems  Included in HPI  All systems reviewed and negative except for those discussed in HPI.      PHYSICAL EXAM    I have reviewed the triage vital signs and nursing notes.    ED Triage Vitals [08/17/24 0438]   Temp Heart Rate Resp BP SpO2   97.5 °F (36.4 °C) 78 18 124/68 100 %      Temp src Heart Rate Source Patient Position BP Location FiO2 (%)   -- -- -- -- --       Physical Exam    Physical Exam   Constitutional: No distress.  Nontoxic but thin and frail appearing  HENT:  Head: Normocephalic and atraumatic.   Oropharynx: Mucous membranes are moist.   Eyes: . No scleral icterus. No conjunctival pallor.  Neck: Normal range of motion. Neck supple.   Cardiovascular: Pink warm and well perfused throughout.    Pulmonary/Chest: No respiratory distress.  No tachypnea or increased work of breathing appreciated.    Abdominal: Soft. There is no tenderness. There is no rebound and no guarding.   Back: Atraumatic thoracic and lumbar spine.  Strong smelling sacral wound with dressing in place with some blood.  MAXI: Formed stool in vault.  Hemoccult negative  Musculoskeletal: Moves all extremities equally.    Neurological: Alert and oriented.  No acute focal deficit appreciated.  Skin: Skin is pink, warm, and dry.   Psychiatric: Mood and affect normal.   Nursing note and vitals reviewed.             LAB RESULTS  Recent Results (from the past 24 hour(s))   CBC Auto Differential    Collection Time: 08/16/24  9:15 PM    Specimen: Blood   Result Value Ref Range    WBC 9.35 3.40 - 10.80 10*3/mm3    RBC  2.57 (L) 4.14 - 5.80 10*6/mm3    Hemoglobin 6.7 (C) 13.0 - 17.7 g/dL    Hematocrit 21.6 (L) 37.5 - 51.0 %    MCV 84.0 79.0 - 97.0 fL    MCH 26.1 (L) 26.6 - 33.0 pg    MCHC 31.0 (L) 31.5 - 35.7 g/dL    RDW 14.8 12.3 - 15.4 %    RDW-SD 44.8 37.0 - 54.0 fl    MPV 8.6 6.0 - 12.0 fL    Platelets 470 (H) 140 - 450 10*3/mm3    Neutrophil % 69.0 42.7 - 76.0 %    Lymphocyte % 16.0 (L) 19.6 - 45.3 %    Monocyte % 12.2 (H) 5.0 - 12.0 %    Eosinophil % 2.2 0.3 - 6.2 %    Basophil % 0.4 0.0 - 1.5 %    Immature Grans % 0.2 0.0 - 0.5 %    Neutrophils, Absolute 6.44 1.70 - 7.00 10*3/mm3    Lymphocytes, Absolute 1.50 0.70 - 3.10 10*3/mm3    Monocytes, Absolute 1.14 (H) 0.10 - 0.90 10*3/mm3    Eosinophils, Absolute 0.21 0.00 - 0.40 10*3/mm3    Basophils, Absolute 0.04 0.00 - 0.20 10*3/mm3    Immature Grans, Absolute 0.02 0.00 - 0.05 10*3/mm3    nRBC 0.0 0.0 - 0.2 /100 WBC   POCT Occult Blood Stool    Collection Time: 08/17/24  5:04 AM    Specimen: Per Rectum; Stool   Result Value Ref Range    Fecal Occult Blood Negative Negative    Lot Number [id number]     Expiration Date 2-     Positive Control Positive Positive    Negative Control Negative Negative   Comprehensive Metabolic Panel    Collection Time: 08/17/24  5:22 AM    Specimen: Blood   Result Value Ref Range    Glucose 97 65 - 99 mg/dL    BUN 12 8 - 23 mg/dL    Creatinine 0.54 (L) 0.76 - 1.27 mg/dL    Sodium 134 (L) 136 - 145 mmol/L    Potassium 4.1 3.5 - 5.2 mmol/L    Chloride 101 98 - 107 mmol/L    CO2 27.5 22.0 - 29.0 mmol/L    Calcium 8.9 8.6 - 10.5 mg/dL    Total Protein 8.2 6.0 - 8.5 g/dL    Albumin 2.6 (L) 3.5 - 5.2 g/dL    ALT (SGPT) 61 (H) 1 - 41 U/L    AST (SGOT) 62 (H) 1 - 40 U/L    Alkaline Phosphatase 85 39 - 117 U/L    Total Bilirubin 0.3 0.0 - 1.2 mg/dL    Globulin 5.6 gm/dL    A/G Ratio 0.5 g/dL    BUN/Creatinine Ratio 22.2 7.0 - 25.0    Anion Gap 5.5 5.0 - 15.0 mmol/L    eGFR 100.7 >60.0 mL/min/1.73   Type & Screen    Collection Time: 08/17/24  5:22 AM     Specimen: Blood   Result Value Ref Range    ABO Type O     RH type Positive     Antibody Screen Negative     T&S Expiration Date 8/20/2024 11:59:59 PM    CBC Auto Differential    Collection Time: 08/17/24  5:22 AM    Specimen: Blood   Result Value Ref Range    WBC 8.55 3.40 - 10.80 10*3/mm3    RBC 2.84 (L) 4.14 - 5.80 10*6/mm3    Hemoglobin 7.5 (L) 13.0 - 17.7 g/dL    Hematocrit 23.7 (L) 37.5 - 51.0 %    MCV 83.5 79.0 - 97.0 fL    MCH 26.4 (L) 26.6 - 33.0 pg    MCHC 31.6 31.5 - 35.7 g/dL    RDW 14.6 12.3 - 15.4 %    RDW-SD 43.9 37.0 - 54.0 fl    MPV 8.2 6.0 - 12.0 fL    Platelets 498 (H) 140 - 450 10*3/mm3    Neutrophil % 63.0 42.7 - 76.0 %    Lymphocyte % 20.1 19.6 - 45.3 %    Monocyte % 11.8 5.0 - 12.0 %    Eosinophil % 3.9 0.3 - 6.2 %    Basophil % 0.7 0.0 - 1.5 %    Immature Grans % 0.5 0.0 - 0.5 %    Neutrophils, Absolute 5.39 1.70 - 7.00 10*3/mm3    Lymphocytes, Absolute 1.72 0.70 - 3.10 10*3/mm3    Monocytes, Absolute 1.01 (H) 0.10 - 0.90 10*3/mm3    Eosinophils, Absolute 0.33 0.00 - 0.40 10*3/mm3    Basophils, Absolute 0.06 0.00 - 0.20 10*3/mm3    Immature Grans, Absolute 0.04 0.00 - 0.05 10*3/mm3    nRBC 0.0 0.0 - 0.2 /100 WBC   Iron Profile    Collection Time: 08/17/24  5:22 AM    Specimen: Blood   Result Value Ref Range    Iron 15 (L) 59 - 158 mcg/dL    Iron Saturation (TSAT) 8 (L) 20 - 50 %    Transferrin 121 (L) 200 - 360 mg/dL    TIBC 180 (L) 298 - 536 mcg/dL   Urinalysis With Culture If Indicated - Urine, Catheter    Collection Time: 08/17/24  5:36 AM    Specimen: Urine, Catheter   Result Value Ref Range    Color, UA Yellow Yellow, Straw    Appearance, UA Turbid (A) Clear    pH, UA 7.0 5.0 - 8.0    Specific Gravity, UA 1.018 1.005 - 1.030    Glucose, UA Negative Negative    Ketones, UA Negative Negative    Bilirubin, UA Negative Negative    Blood, UA Small (1+) (A) Negative    Protein,  mg/dL (2+) (A) Negative    Leuk Esterase, UA Large (3+) (A) Negative    Nitrite, UA Positive (A) Negative     Urobilinogen, UA 1.0 E.U./dL 0.2 - 1.0 E.U./dL         RADIOLOGY  No Radiology Exams Resulted Within Past 24 Hours      MEDICATIONS GIVEN IN ER  Medications   sodium chloride 0.9 % flush 10 mL (has no administration in time range)   fentaNYL citrate (PF) (SUBLIMAZE) injection 50 mcg (50 mcg Intravenous Given 8/17/24 0531)         ORDERS PLACED DURING THIS VISIT:  Orders Placed This Encounter   Procedures    Comprehensive Metabolic Panel    Urinalysis With Culture If Indicated - Urine, Catheter    aPTT    Protime-INR    CBC Auto Differential    Iron Profile    Urinalysis, Microscopic Only - Urine, Clean Catch    Monitor Blood Pressure    Continuous Pulse Oximetry    [facility] (on-call MD unless specified) Details    POCT Occult Blood Stool    Type & Screen    Insert Peripheral IV    Initiate Observation Status    CBC & Differential         OUTPATIENT MEDICATION MANAGEMENT:  Current Facility-Administered Medications Ordered in Epic   Medication Dose Route Frequency Provider Last Rate Last Admin    sodium chloride 0.9 % flush 10 mL  10 mL Intravenous PRN Tom Noel MD         Current Outpatient Medications Ordered in Epic   Medication Sig Dispense Refill    acetaminophen (TYLENOL) 325 MG tablet Take 2 tablets by mouth Every 4 (Four) Hours As Needed for Mild Pain.      Ascorbic Acid (VITAMIN C PO) Daily.      atorvastatin (LIPITOR) 40 MG tablet       bisacodyl (DULCOLAX) 10 MG suppository Insert 1 suppository into the rectum Daily As Needed for Constipation (Use if bisacodyl oral is ineffective).      bisacodyl (DULCOLAX) 5 MG EC tablet Take 1 tablet by mouth Daily As Needed for Constipation (Use if polyethylene glycol is ineffective).      calcium carbonate (TUMS) 500 MG chewable tablet Chew 2 tablets 2 (Two) Times a Day As Needed for Heartburn.      castor oil-balsam peru (VENELEX) ointment Apply 1 Application topically to the appropriate area as directed Every 12 (Twelve) Hours. Apply to foot, heel, and ankle wounds  per wound care order.      cyclobenzaprine (FLEXERIL) 10 MG tablet TAKE 0.5   1 TABLET BY ORAL ROUTE AT BEDTIME AS NEEDED      Effer-K 20 MEQ effervescent tablet       HYDROcodone-acetaminophen (NORCO) 5-325 MG per tablet       lansoprazole (PREVACID SOLUTAB) 30 MG Tablet Delayed Release Dispersible disintegrating tablet Administer 1 tablet per G tube Every Morning.      melatonin 3 MG tablet Take 1 tablet by mouth At Night As Needed for Sleep.      Menthol-Zinc Oxide 0.44-20.6 % ointment Apply 1 Application topically to the appropriate area as directed Every 12 (Twelve) Hours.      methotrexate 2.5 MG tablet Take 1 tablet by mouth 1 (One) Time Per Week. Take 2.5 mg on Wednesday and 2.5 mg on Thursday.  Do not take any medication on Friday through Tuesday.      mupirocin (BACTROBAN) 2 % ointment Apply 1 Application topically to the appropriate area as directed Every 12 (Twelve) Hours. Apply to Penis Abrasion for 3 more days      Omega-3 Fatty Acids (OMEGA 3 PO) Take 1 capsule by mouth Daily.      polyethylene glycol (MIRALAX) 17 g packet Take 17 g by mouth Daily As Needed (Use if senna-docusate is ineffective).      potassium chloride (KAYCIEL) 20 mEq/15 mL solution Take 15 mL by mouth Daily.      sennosides-docusate (PERICOLACE) 8.6-50 MG per tablet Take 2 tablets by mouth 2 (Two) Times a Day As Needed for Constipation.      terazosin (HYTRIN) 1 MG capsule Administer 1 capsule per G tube Every Night.      VITAMIN D PO Daily.           PROCEDURES  Procedures            PROGRESS, DATA ANALYSIS, CONSULTS, AND MEDICAL DECISION MAKING  All labs have been independently interpreted by me.  All radiology studies have been reviewed by me. All EKG's have been independently viewed and interpreted by me.  Discussion below represents my analysis of pertinent findings related to patient's condition, differential diagnosis, treatment plan and final disposition.    Differential diagnosis:   My differential diagnosis includes but  is not limited to generalized weakness, electrolyte abnormality, CVA, TIA, Bell's palsy, acute MI, GI bleed, urinary tract infection, systemic infections including sepsis, alcohol abuse, drug abuse including prescription and street drug.      Clinical Scores:                  ED Course as of 08/17/24 0622   Sat Aug 17, 2024   0548 WBC: 8.55 [RS]   0548 Hemoglobin(!): 7.5 [RS]   0548 Platelets(!): 498 [RS]   0559 Glucose: 97 [RS]   0600 BUN: 12 [RS]   0600 Creatinine(!): 0.54 [RS]   0600 Sodium(!): 134 [RS]   0600 Potassium: 4.1 [RS]   0600 Albumin(!): 2.6 [RS]   0600 Total Bilirubin: 0.3 [RS]   0621 CONSULT        Provider: Dr. Rojas - [facility]    Discussion: Reviewed patient history, ED presentation and evaluation as well as considerations for transfusion of packed red cells versus iron infusion.  He is agreeable to accept the patient for admission and will work through that problem as inpatient    Agreeable c treatment and planned disposition.         [RS]      ED Course User Index  [RS] Tom Noel MD         Prescription drug monitoring program review:     AS OF 06:22 EDT VITALS:    BP - 124/68  HR - 78  TEMP - 97.5 °F (36.4 °C)  O2 SATS - 100%    COMPLEXITY OF CARE  The patient requires admission.      DIAGNOSIS  Final diagnoses:   Acute on chronic anemia   Wound of sacral region, initial encounter   Chronic indwelling Mariscal catheter   Iron deficiency         DISPOSITION  ED Disposition       ED Disposition   Decision to Admit    Condition   --    Comment   Level of Care: Telemetry [5]   Diagnosis: Acute on chronic anemia [[id number]]   Admitting Physician: MARCELLUS ROJAS [[id number]]                    ADMISSION    Discussed treatment plan and reason for admission with pt/family and admitting physician.  Pt/family voiced understanding of the plan for admission for further testing/treatment as needed.       Please note that portions of this document were completed with a voice recognition program.    Note Disclaimer:  At [facility], we believe that sharing information builds trust and better relationships. You are receiving this note because you recently visited [facility]. It is possible you will see health information before a provider has talked with you about it. This kind of information can be easy to misunderstand. To help you fully understand what it means for your health, we urge you to discuss this note with your provider.         Tom Noel MD  08/17/24 0622      Electronically signed by Tom Noel MD at 08/17/24 0622       Oxygen Therapy (since admission)       Date/Time SpO2 Device (Oxygen Therapy) Flow (L/min) Oxygen Concentration (%) ETCO2 (mmHg)    08/20/24 1031 100 room air -- -- --    08/20/24 0829 -- room air -- -- --    08/20/24 0724 98 room air -- -- --    08/19/24 2347 100 room air -- -- --    08/19/24 2020 -- room air -- -- --    08/19/24 1953 96 room air -- -- --    08/19/24 1445 -- room air -- -- --    08/19/24 1252 100 room air -- -- --    08/19/24 0855 -- room air -- -- --    08/19/24 0728 100 room air -- -- --    08/19/24 0045 -- room air -- -- --    08/18/24 2336 100 -- -- -- --    08/18/24 2016 100 room air -- -- --    08/18/24 1322 -- room air -- -- --    08/18/24 0855 -- room air -- -- --    08/18/24 0734 100 room air -- -- --    08/18/24 0005 -- room air -- -- --    08/17/24 2349 100 room air -- -- --    08/17/24 2032 -- room air -- -- --    08/17/24 2000 100 room air -- -- --    08/17/24 1415 -- room air -- -- --    08/17/24 1311 -- room air -- -- --    08/17/24 0900 -- room air -- -- --    08/17/24 0835 100 room air -- -- --    08/17/24 0730 100 room air -- -- --    08/17/24 0721 100 room air -- -- --    08/17/24 0621 100 -- -- -- --    08/17/24 0438 100 room air -- -- --          Intake & Output (last 4 days)         08/16 0701 08/17 0700 08/17 0701 08/18 0700 08/18 0701 08/19 0700 08/19 0701 08/20 0700 08/20 0701 08/21 0700    P.O.  0 240      I.V. (mL/kg)     500 (6.9)     Other  60 90 210     NG/GT  147 849 254     Total Intake(mL/kg)  207 (3.3) 1179 (16.7) 464 (6.4) 500 (6.9)    Urine (mL/kg/hr)  600 (0.4) 550 (0.3) 500 (0.3)     Total Output  600 550 500     Net  -[phone] -36 +500                   Lines, Drains & Airways       Active LDAs       Name Placement date Placement time Site Days    Peripheral IV 08/17/24 0521 Anterior;Distal;Left Forearm 08/17/24  0521  Forearm  3    Peripheral IV 08/20/24 1015 Posterior;Right Forearm 08/20/24  1015  Forearm  less than 1    Gastrostomy/Enterostomy Percutaneous endoscopic gastrostomy (PEG) 20 Fr. LUQ 03/29/24  0715  LUQ  144    Urethral Catheter Straight-tip 16 Fr. 08/17/24  0624  -- 3              Inactive LDAs       Name Placement date Placement time Removal date Removal time Site Days    [REMOVED] Peripheral IV 08/17/24 0530 Right Antecubital 08/17/24  0530  08/20/24  1019  Antecubital  3    [REMOVED] Urethral Catheter 16 Fr. 06/18/24  0247  08/17/24  0623  -- 60    [REMOVED] ETT  08/20/24  1213  created via procedure documentation  08/20/24  1330  -- less than 1                  Medication Administration Report for Anthony Gallegos as of 8/17/24 through 8/20/24     Legend:    Given Hold Not Given Due Canceled Entry Other Actions    Time Time (Time) Time Time-Action         Discontinued     Completed     Future     MAR Hold     Linked             Medications 08/17/24 08/18/24 08/19/24 08/20/24      acetaminophen (TYLENOL) tablet 650 mg  Dose: 650 mg  Freq: Every 4 Hours PRN Route: PO  PRN Reason: Mild Pain  Start: 08/17/24 1439     Admin Instructions:   If given for fever, use fever parameter: fever greater than 100.4 °F  Based on patient request - if ordered for moderate or severe pain, provider allows for administration of a medication prescribed for a lower pain scale.    Do not exceed 4 grams of acetaminophen in a 24 hr period. Max dose of 2gm for AST/ALT greater than 120 units/L.    If given for pain, use the following pain  scale:   Mild Pain = Pain Score of 1-3, CPOT 1-2  Moderate Pain = Pain Score of 4-6, CPOT 3-4  Severe Pain = Pain Score of 7-10, CPOT 5-8         0949-MAR Hold           Or   acetaminophen (TYLENOL) 160 MG/5ML oral solution 650 mg  Dose: 650 mg  Freq: Every 4 Hours PRN Route: PO  PRN Reason: Mild Pain  Start: 08/17/24 1439     Admin Instructions:   If given for fever, use fever parameter: fever greater than 100.4 °F  Based on patient request - if ordered for moderate or severe pain, provider allows for administration of a medication prescribed for a lower pain scale.    Do not exceed 4 grams of acetaminophen in a 24 hr period. Max dose of 2gm for AST/ALT greater than 120 units/L.    If given for pain, use the following pain scale:   Mild Pain = Pain Score of 1-3, CPOT 1-2  Moderate Pain = Pain Score of 4-6, CPOT 3-4  Severe Pain = Pain Score of 7-10, CPOT 5-8         0949-MAR Hold           Or   acetaminophen (TYLENOL) suppository 650 mg  Dose: 650 mg  Freq: Every 4 Hours PRN Route: RE  PRN Reason: Mild Pain  Start: 08/17/24 1439     Admin Instructions:   If given for fever, use fever parameter: fever greater than 100.4 °F  Based on patient request - if ordered for moderate or severe pain, provider allows for administration of a medication prescribed for a lower pain scale.    Do not exceed 4 grams of acetaminophen in a 24 hr period. Max dose of 2gm for AST/ALT greater than 120 units/L.    If given for pain, use the following pain scale:   Mild Pain = Pain Score of 1-3, CPOT 1-2  Moderate Pain = Pain Score of 4-6, CPOT 3-4  Severe Pain = Pain Score of 7-10, CPOT 5-8         0949-MAR Hold           atorvastatin (LIPITOR) tablet 40 mg  Dose: 40 mg  Freq: Daily Route: PO  Start: 08/17/24 1530     Admin Instructions:   Avoid grapefruit juice.      1640-Given         0855-Given         0917-Given         0900     0949-MAR Hold           sennosides-docusate (PERICOLACE) 8.6-50 MG per tablet 2 tablet  Dose: 2 tablet  Freq:  "2 Times Daily Route: PO  Start: 08/17/24 2100     Admin Instructions:   HOLD MEDICATION IF PATIENT HAS HAD BOWEL MOVEMENT. Start bowel management regimen if patient has not had a bowel movement after 12 hours.      2052-Given         0854-Given     2029-Given        0917-Given     2011-Given        0900     0949-MAR Hold       2100-MAR Hold           And   polyethylene glycol (MIRALAX) packet 17 g  Dose: 17 g  Freq: Daily PRN Route: PO  PRN Reason: Constipation  PRN Comment: Use if senna-docusate is ineffective  Start: 08/17/24 1439     Admin Instructions:   Use if no bowel movement after 12 hours. Mix in 6-8 ounces of water.  Use 4-8 ounces of water, tea, or juice for each 17 gram dose.         0949-MAR Hold           And   bisacodyl (DULCOLAX) EC tablet 5 mg  Dose: 5 mg  Freq: Daily PRN Route: PO  PRN Reason: Constipation  PRN Comment: Use if polyethylene glycol is ineffective  Start: 08/17/24 1439     Admin Instructions:   Use if no bowel movement after 12 hours.  Swallow whole. Do not crush, split, or chew tablet.         0949-MAR Hold           And   bisacodyl (DULCOLAX) suppository 10 mg  Dose: 10 mg  Freq: Daily PRN Route: RE  PRN Reason: Constipation  PRN Comment: Use if bisacodyl oral is ineffective  Start: 08/17/24 1439     Admin Instructions:   Use if no bowel movement after 12 hours.  Hold for diarrhea         0949-MAR Hold           Calcium Replacement - Follow Nurse / BPA Driven Protocol  Freq: As Needed Route: XX  PRN Reason: Other  Start: 08/17/24 1438     Admin Instructions:   Open Order & Select \"[facility] Electrolyte Replacement Protocol Algorithm\" to View Details         0949-MAR Hold           ciprofloxacin (CILOXAN) 0.3 % ophthalmic solution 2 drop  Dose: 2 drop  Freq: Every 4 Hours Route: RIGHT EYE  Start: 08/17/24 1545      (1640)-Not Given     2030-Given       2348-Given         0441-Given     0855-Given       1114-Given     1505-Given       2018-Given         0044-Given     0456-Given     "   0918-Given     1149-Given       (1600)-Not Given     2013-Given        0029-Given     (0328)-Not Given [C]       0826-Given     0949-MAR Hold       1145-MAR Hold     1545-MAR Hold       1945-MAR Hold     2345-MAR Hold          collagenase ointment 1 Application  Dose: 1 Application  Freq: 2 Times Daily Route: TOP  Start: 08/19/24 1215     Admin Instructions:   Apply nickel thick layer of santyl to sacral and left ischial wounds, then pack with Dakins wet to dry dressings, cover with 6x6 Border guaze dressings. Change BID        (1413)-Not Given [C]     (2018)-Not Given [C]        0900     0949-MAR Hold       2100-MAR Hold           cyclobenzaprine (FLEXERIL) tablet 5 mg  Dose: 5 mg  Freq: Nightly PRN Route: PO  PRN Reason: Muscle Spasms  Start: 08/17/24 1432         0949-MAR Hold           diphenhydrAMINE (BENADRYL) injection 12.5 mg  Dose: 12.5 mg  Freq: Every 15 Minutes PRN Route: IV  PRN Reason: Itching  PRN Comment: May repeat x 1  Start: 08/20/24 1334     Admin Instructions:   Caution: Look alike/sound alike drug alert. This med may be ordered in other forms and routes. Before giving verify the last time the drug was given by any route/form.             droperidol (INAPSINE) injection 0.625 mg  Dose: 0.625 mg  Freq: Every 20 Minutes PRN Route: IV  PRN Reasons: Nausea,Vomiting  Indications of Use: NAUSEA AND VOMITING  Start: 08/20/24 1334     Admin Instructions:   Use ondansetron first; proceed to droperidol for nausea refractory to ondansetron.            Or   droperidol (INAPSINE) injection 0.625 mg  Dose: 0.625 mg  Freq: Every 20 Minutes PRN Route: IM  PRN Reasons: Nausea,Vomiting  Indications of Use: NAUSEA AND VOMITING  Start: 08/20/24 1334     Admin Instructions:   Use ondansetron first; proceed to droperidol for nausea refractory to ondansetron.            ePHEDrine injection 5 mg  Dose: 5 mg  Freq: Once As Needed Route: IV  PRN Comment: symptomatic hypotension - Notify attending anesthesiologist if  this needs to be given  Start: 08/20/24 1334     Admin Instructions:   Caution: Look alike/sound alike drug alert   Dilute with NS to 5-10 mg/mL.  Central line preferred, if unavailable use large bore IV access with frequent nurse monitoring of IV site.            ertapenem (INVanz) 1,000 mg in sodium chloride 0.9 % 100 mL MBP  Dose: 1,000 mg  Freq: Every 24 Hours Route: IV  Indications of Use: URINARY TRACT INFECTION  Start: 08/19/24 1100 End: 08/26/24 1059     Admin Instructions:   Caution: Look alike/sound alike drug alert.        1149-New Bag         1148-New Bag           fentaNYL citrate (PF) (SUBLIMAZE) injection 25 mcg  Dose: 25 mcg  Freq: Every 5 Minutes PRN Route: IV  PRN Reason: Severe Pain  Start: 08/20/24 1334     Admin Instructions:   Maximum total dose of fentanyl is 200 mcg.  If given for pain, use the following pain scale:  Mild Pain = Pain Score of 1-3, CPOT 1-2  Moderate Pain = Pain Score of 4-6, CPOT 3-4  Severe Pain = Pain Score of 7-10, CPOT 5-8            flumazenil (ROMAZICON) injection 0.2 mg  Dose: 0.2 mg  Freq: As Needed Route: IV  PRN Comment: for benzodiazepine induced unresponsiveness or sedation  Indications of Use: BENZODIAZEPINE-INDUCED SEDATION  Start: 08/20/24 1334     Admin Instructions:   Notify Anesthesia if given  ** give IV over 15-30 seconds **            hydrALAZINE (APRESOLINE) injection 5 mg  Dose: 5 mg  Freq: Every 10 Minutes PRN Route: IV  PRN Reason: High Blood Pressure  PRN Comment: for systolic blood pressure greater than 180 mmHg or diastolic blood pressure greater than 105 mmHg  Start: 08/20/24 1334     Admin Instructions:   Up to 20 mg. If labetalol and hydralazine are both ordered, use labetalol first.  Caution: Look alike/sound alike drug alert            HYDROcodone-acetaminophen (NORCO) 5-325 MG per tablet 1 tablet  Dose: 1 tablet  Freq: Once As Needed Route: PO  PRN Reason: Moderate Pain  Start: 08/20/24 1334     Admin Instructions:   Based on patient request  - if ordered for moderate or severe pain, provider allows for administration of a medication prescribed for a lower pain scale.  [MARION]    Do not exceed 4 grams of acetaminophen in a 24 hr period. Max dose of 2gm for AST/ALT greater than 120 units/L        If given for pain, use the following pain scale:   Mild Pain = Pain Score of 1-3, CPOT 1-2  Moderate Pain = Pain Score of 4-6, CPOT 3-4  Severe Pain = Pain Score of 7-10, CPOT 5-8            HYDROcodone-acetaminophen (NORCO) 5-325 MG per tablet 1 tablet  Dose: 1 tablet  Freq: Every 6 Hours PRN Route: PO  PRN Reason: Moderate Pain  Start: 08/17/24 1439 End: 08/22/24 1438     Admin Instructions:   Based on patient request - if ordered for moderate or severe pain, provider allows for administration of a medication prescribed for a lower pain scale.  [MARION]    Do not exceed 4 grams of acetaminophen in a 24 hr period. Max dose of 2gm for AST/ALT greater than 120 units/L        If given for pain, use the following pain scale:   Mild Pain = Pain Score of 1-3, CPOT 1-2  Moderate Pain = Pain Score of 4-6, CPOT 3-4  Severe Pain = Pain Score of 7-10, CPOT 5-8         0949-MAR Hold           HYDROcodone-acetaminophen (NORCO) 7.5-325 MG per tablet 1 tablet  Dose: 1 tablet  Freq: Every 4 Hours PRN Route: PO  PRN Reason: Severe Pain  Start: 08/20/24 1334 End: 08/27/24 1333     Admin Instructions:   Based on patient request - if ordered for moderate or severe pain, provider allows for administration of a medication prescribed for a lower pain scale.  [MARION]    Do not exceed 4 grams of acetaminophen in a 24 hr period. Max dose of 2gm for AST/ALT greater than 120 units/L        If given for pain, use the following pain scale:   Mild Pain = Pain Score of 1-3, CPOT 1-2  Moderate Pain = Pain Score of 4-6, CPOT 3-4  Severe Pain = Pain Score of 7-10, CPOT 5-8            HYDROmorphone (DILAUDID) injection 0.25 mg  Dose: 0.25 mg  Freq: Every 5 Minutes PRN Route: IV  PRN Reason: Moderate  Pain  Start: 08/20/24 1334     Admin Instructions:   Maximum total dose of hydromorphone is 2 mg.  If given for pain, use the following pain scale:  Mild Pain = Pain Score of 1-3, CPOT 1-2  Moderate Pain = Pain Score of 4-6, CPOT 3-4  Severe Pain = Pain Score of 7-10, CPOT 5-8            ipratropium-albuterol (DUO-NEB) nebulizer solution 3 mL  Dose: 3 mL  Freq: Once As Needed Route: NEBULIZATION  PRN Reasons: Wheezing,Shortness of Air  PRN Comment: bronchospasm  Start: 08/20/24 1334     Admin Instructions:   Notify Anesthesia if minineb is given            labetalol (NORMODYNE,TRANDATE) injection 5 mg  Dose: 5 mg  Freq: Every 5 Minutes PRN Route: IV  PRN Reason: High Blood Pressure  PRN Comment: for systolic blood pressure greater than 180 mmHg or diastolic blood pressure greater than 105 mmHg  Start: 08/20/24 1334     Admin Instructions:   Hold for heart rate less than 60.    If labetalol and hydralazine are both ordered, use labetalol first. Maximum dose of labetalol is 20mg IV while in PACU, notify anesthesiologist for further orders if needed.  Give IV Push over 2 minutes.            lactated ringers infusion  Rate: 9 mL/hr Dose: 9 mL/hr  Freq: Continuous Route: IV  Start: 08/20/24 1104         1148-New Bag     1202-Paused [C]       1203-Restarted     1331-Anesthesia Volume Adjustment          lansoprazole (PREVACID SOLUTAB) disintegrating tablet Tablet Delayed Release Dispersible 30 mg  Dose: 30 mg  Freq: Every Early Morning Route: PER G TUBE  Start: 08/18/24 0600     Admin Instructions:   For tube administration, place 30 mg tablet in syringe, draw up 10 mL water, & mix (do not crush).Or place 15 mg tablet in syringe, draw up 4 mL water, and mix (do not crush).** Flush tube with 10 mL **       0615-Given         0618-Given         0608-Given     0949-MAR Hold          levothyroxine (SYNTHROID, LEVOTHROID) tablet 25 mcg  Dose: 25 mcg  Freq: Every Early Morning Route: PO  Start: 08/18/24 1130     Admin  "Instructions:   Take on empty stomach.       1113-Given         0618-Given         0608-Given     0949-MAR Hold          lidocaine (XYLOCAINE) 1 % injection 0.5 mL  Dose: 0.5 mL  Freq: Once As Needed Route: ID  PRN Comment: IV Start  Start: 08/20/24 1102 End: 08/20/24 1335            Magnesium Standard Dose Replacement - Follow Nurse / BPA Driven Protocol  Freq: As Needed Route: XX  PRN Reason: Other  Start: 08/17/24 1438     Admin Instructions:   Open Order & Select \"[facility] Electrolyte Replacement Protocol Algorithm\" to View Details         0949-MAR Hold           melatonin tablet 5 mg  Dose: 5 mg  Freq: Nightly PRN Route: PO  PRN Reason: Sleep  Start: 08/17/24 1434         0949-MAR Hold           naloxone (NARCAN) injection 0.2 mg  Dose: 0.2 mg  Freq: As Needed Route: IV  PRN Reasons: Opioid Reversal,Respiratory Depression  PRN Comment: unresponsiveness, decrease oxygen saturation  Indications of Use: ACUTE RESPIRATORY FAILURE,OPIOID-INDUCED RESPIRATORY DEPRESSION  Start: 08/20/24 1334     Admin Instructions:   Notify Anesthesia if given            nitroglycerin (NITROSTAT) SL tablet 0.4 mg  Dose: 0.4 mg  Freq: Every 5 Minutes PRN Route: SL  PRN Reason: Chest Pain  PRN Comment: Only if SBP Greater Than 100  Start: 08/17/24 1436     Admin Instructions:   If Pain Unrelieved After 3 Doses Notify MD  May administer up to 3 doses per episode.         0949-MAR Hold           ondansetron (ZOFRAN) injection 4 mg  Dose: 4 mg  Freq: Once As Needed Route: IV  PRN Reasons: Nausea,Vomiting  Indications of Use: POSTOPERATIVE NAUSEA AND VOMITING  Start: 08/20/24 1334     Admin Instructions:   If BOTH ondansetron (ZOFRAN) and promethazine (PHENERGAN) are ordered use ondansetron first and THEN promethazine IF ondansetron is ineffective.             ondansetron ODT (ZOFRAN-ODT) disintegrating tablet 4 mg  Dose: 4 mg  Freq: Every 6 Hours PRN Route: PO  PRN Reasons: Nausea,Vomiting  Start: 08/17/24 1439     Admin Instructions:   If BOTH " "ondansetron (ZOFRAN) and promethazine (PHENERGAN) are ordered use ondansetron first and THEN promethazine IF ondansetron is ineffective.  Place on tongue and allow to dissolve.         0949-MAR Hold           Or   ondansetron (ZOFRAN) injection 4 mg  Dose: 4 mg  Freq: Every 6 Hours PRN Route: IV  PRN Reasons: Nausea,Vomiting  Start: 08/17/24 1439     Admin Instructions:   If BOTH ondansetron (ZOFRAN) and promethazine (PHENERGAN) are ordered use ondansetron first and THEN promethazine IF ondansetron is ineffective.         0949-MAR Hold           Phosphorus Replacement - Follow Nurse / BPA Driven Protocol  Freq: As Needed Route: XX  PRN Reason: Other  Start: 08/17/24 1438     Admin Instructions:   Open Order & Select \"[facility] Electrolyte Replacement Protocol Algorithm\" to View Details         0949-MAR Hold           potassium chloride (KLOR-CON) packet 20 mEq  Dose: 20 mEq  Freq: Daily Route: PO  Start: 08/18/24 0945     Admin Instructions:   Mix in at least 4 oz. of liquid.       1113-Given         0917-Given         0900           Potassium Replacement - Follow Nurse / BPA Driven Protocol  Freq: As Needed Route: XX  PRN Reason: Other  Start: 08/17/24 1438     Admin Instructions:   Open Order & Select \"[facility] Electrolyte Replacement Protocol Algorithm\" to View Details             promethazine (PHENERGAN) suppository 25 mg  Dose: 25 mg  Freq: Once As Needed Route: RE  PRN Reasons: Nausea,Vomiting  Start: 08/20/24 1334     Admin Instructions:   If BOTH ondansetron (ZOFRAN) and promethazine (PHENERGAN) are ordered use ondansetron first and THEN promethazine IF ondansetron is ineffective.            Or   promethazine (PHENERGAN) tablet 25 mg  Dose: 25 mg  Freq: Once As Needed Route: PO  PRN Reasons: Nausea,Vomiting  Start: 08/20/24 1334     Admin Instructions:   If BOTH ondansetron (ZOFRAN) and promethazine (PHENERGAN) are ordered use ondansetron first and THEN promethazine IF ondansetron is ineffective.              sodium " chloride 0.9 % flush 10 mL  Dose: 10 mL  Freq: As Needed Route: IV  PRN Reason: Line Care  Start: 08/17/24 1435         0949-MAR Hold           sodium chloride 0.9 % flush 10 mL  Dose: 10 mL  Freq: Every 12 Hours Scheduled Route: IV  Start: 08/17/24 2100 2052-Given         0855-Given     2029-Given        0918-Given     2012-Given        0900     0949-MAR Hold       2100-MAR Hold            sodium chloride 0.9 % flush 10 mL  Dose: 10 mL  Freq: As Needed Route: IV  PRN Reason: Line Care  Start: 08/17/24 0453         0949-MAR Hold           sodium chloride 0.9 % flush 3 mL  Dose: 3 mL  Freq: Every 12 Hours Scheduled Route: IV  Start: 08/20/24 1104 End: 08/20/24 1335         1104           sodium chloride 0.9 % flush 3-10 mL  Dose: 3-10 mL  Freq: As Needed Route: IV  PRN Reason: Line Care  Start: 08/20/24 1102 End: 08/20/24 1335            sodium chloride 0.9 % infusion 40 mL  Dose: 40 mL  Freq: As Needed Route: IV  PRN Reason: Line Care  Start: 08/17/24 1435     Admin Instructions:   Following administration of an IV intermittent medication, flush line with 40mL NS at 100mL/hr.         0949-MAR Hold           sodium hypochlorite (DAKIN'S 1/4 STRENGTH) 0.125 % topical solution 0.125% solution  Freq: As Needed  Start: 08/20/24 1341 End: 08/20/24 1342         1341-Given [C]           sodium hypochlorite (DAKIN'S 1/4 STRENGTH) 0.125 % topical solution 0.125% solution  Freq: 2 Times Daily Route: TOP  Start: 08/19/24 1215     Admin Instructions:   Apply nickel thick layer of santyl to sacral and left ischial wounds, then pack with Dakins wet to dry dressings, cover with 6x6 Border guaze dressings. Change BID        (1414)-Not Given [C]     (2018)-Not Given [C]        0900     0949-MAR Hold       2100-MAR Hold           terazosin (HYTRIN) capsule 1 mg  Dose: 1 mg  Freq: Nightly Route: PER G TUBE  Start: 08/17/24 2100     Admin Instructions:   Hold for SBP less than 100, DBP less than 60.      (2046)-Not Given [C]          (2014)-Not Given [C]         2008-Given         2100           Future Medications  Medications 08/17/24 08/18/24 08/19/24 08/20/24      methotrexate tablet 2.5 mg  Dose: 2.5 mg  Freq: Once per day on Wednesday Thursday Route: PO  Indications of Use: Non-oncologic  Start: 08/21/24 0900     Admin Instructions:   Group 1 (Yellow) Hazardous Drug Antineoplastic - See Handling Guide            Completed Medications  Medications 08/17/24 08/18/24 08/19/24 08/20/24      epoetin alma delia-epbx (RETACRIT) injection 20,000 Units  Dose: 20,000 Units  Freq: Once Route: SC  Indications of Use: ANEMIA  Start: 08/19/24 1330 End: 08/19/24 1828        1828-Given            fentaNYL citrate (PF) (SUBLIMAZE) injection 50 mcg  Dose: 50 mcg  Freq: Once Route: IV  Start: 08/17/24 0520 End: 08/17/24 0531     Admin Instructions:   If given for pain, use the following pain scale:  Mild Pain = Pain Score of 1-3, CPOT 1-2  Moderate Pain = Pain Score of 4-6, CPOT 3-4  Severe Pain = Pain Score of 7-10, CPOT 5-8      0531-Given              iopamidol (ISOVUE-300) 61 % injection 100 mL  Dose: 100 mL  Freq: Once in Imaging Route: IV  Start: 08/19/24 1745 End: 08/19/24 1659        1659-Given by Other            Discontinued Medications  Medications 08/17/24 08/18/24 08/19/24 08/20/24      cefTRIAXone (ROCEPHIN) 2,000 mg in sodium chloride 0.9 % 100 mL MBP  Dose: 2,000 mg  Freq: Every 24 Hours Route: IV  Indications of Use: URINARY TRACT INFECTION  Start: 08/17/24 1530 End: 08/19/24 1007     Admin Instructions:   LR should be paused and flushing of the line with NS is recommended prior to and after completion of ceftriaxone infusion due to incompatibility. Do not co-adminster with calcium-containing solutions.  Caution: Look alike/sound alike drug alert      1639-New Bag     2044-Stopped        1505-New Bag             potassium chloride (KAYCIEL) 20 mEq/15 mL solution 20 mEq  Dose: 20 mEq  Freq: Daily Route: PO  Start: 08/17/24 1530 End: 08/18/24  0859     Admin Instructions:   Mix well in at least 4 ounces of liquid.      (1640)-Not Given         (0855)-Not Given                         Blood Administration Record (From admission, onward)      None             Operative/Procedure Notes (all)        Renny Duval DPM at 08/20/24 1259  Version 1 of 1         DEBRIDEMENT FOOT  Progress Note    Anthony Gallegos  8/20/2024    Pre-op Diagnosis:     Left heel ulcer zafar grade 2/3.   Cellulitis left foot         Post op Diagnosis:    Left heel ulcer zafar grade 2/3  Cellulitis left foot    Procedure/CPT® Codes:  Incision and drainage to bone cortex 74563  Wound vac application left foot      Procedure(s):    LEFT FOOT DEBRIDEMENT               Surgeon(s):  Justine Roque MD Vogt, Jordan H, DPM    Anesthesia: General    Staff:   Circulator: Imani Galicia RN  Scrub Person: Philly Lynch         Estimated Blood Loss: 50 mL    Urine Voided: * No values recorded between 8/20/2024 12:02 PM and 8/20/2024  1:19 PM *    Specimens:                Specimens       ID Source Type Tests Collected By Collected At Frozen?    1 Sacrum Tissue TISSUE / BONE CULTURE   Justine Roque MD 8/20/24 1312     Description: sacral wound culture    This specimen was not marked as sent.    2 Foot, Left Tissue TISSUE / BONE CULTURE   Justine Roque MD 8/20/24 1313     Description: left heel wound culture    This specimen was not marked as sent.                  Drains:   Gastrostomy/Enterostomy Percutaneous endoscopic gastrostomy (PEG) 20 Fr. LUQ (Active)   Surrounding Skin Intact;Dry 08/20/24 0021   Drain Status Clamped 08/20/24 0021   Gastrostomy/Enterostomy Site Interventions Site assessed 08/20/24 0021   Dressing Status Clean;Dry;Intact 08/20/24 0021   Dressing Intervention Dressing changed 08/19/24 2020   Dressing Type Split gauze 08/20/24 0021   Tube Feeding Frequency Continuous 08/19/24 2020   Tube Feeding Product Nutren 2 nick 08/19/24 2020   Tube Feeding Strength Full  strength 08/19/24 2020   Tube Feeding Method Other (Comment) 08/20/24 0021   Tube Feeding Rate (mL/hr) 45 mL/HR 08/19/24 2020   Tube Feeding Bag Changed Yes 08/19/24 1830   Tube Feeding Residual (mL) 5 mL 08/19/24 1830   Tube Feeding Residual Returned (mL) 5 mL 08/19/24 1830   Feeding Tube Flushed With Tap water 08/20/24 0021   Flush/ Irrigation Intake (mL) 30 08/20/24 0021   Tube Feeding Intake (mL) 254 08/20/24 0021       Urethral Catheter Straight-tip 16 Fr. (Active)   Daily Indications Chronic Urinary Retention related to Obstructive, Infectious/Inflammatory, Neurologic or Traumatic Causes 08/20/24 0021   Site Assessment Clean;Skin intact 08/20/24 0021   Collection Container Standard drainage bag 08/20/24 0912   Securement Method Securing device 08/20/24 0912   Catheter care complete Yes 08/20/24 0912   Irrigation Ease no resistance met 08/17/24 0625   Output (mL) 500 mL 08/20/24 0503       [REMOVED] Urethral Catheter 16 Fr. (Removed)       Findings: See detailed op noted. Left heel ulcer with depth to calcaneus and achilles tendon insertion. Soft tissue and bone culture obtained. Wound vac applied. Necrosis of soft tissues but no gross purulence present        Complications: none          Renny Duval DPM     Date: 8/20/2024  Time: 13:25 EDT          Electronically signed by Renny Duval DPM at 08/20/24 1329          Physician Progress Notes (all)        Sharif Logan MD at 08/20/24 1248          A    Attempted to visit patient today but he was off the floor for surgical debridement of his sacral/ischial/heel wounds.  Chart reviewed.  Will follow-up with him this afternoon as time allows.  Otherwise we will see him tomorrow morning.    Sharif Logan MD    Electronically signed by Sharif Logan MD at 08/20/24 1249       Yimi Rivas II, MD at 08/20/24 1019            Subjective      REASON FOR FOLLOWUP/CHIEF COMPLAINT: anemia   Anemia  HISTORY OF PRESENT ILLNESS:   No new  issues overnight.  No bleeding    However, sacral and ischial pressure wounds are going to be surgically debrided today.    Past Medical History, Past Surgical History, Social History, Family History have been reviewed and are without significant changes except as mentioned.    Review of Systems   Review of Systems   Constitutional:  Negative for activity change.   HENT:  Negative for nosebleeds and trouble swallowing.    Respiratory:  Negative for shortness of breath and wheezing.    Cardiovascular:  Negative for chest pain and palpitations.   Gastrointestinal:  Negative for constipation, diarrhea and nausea.   Genitourinary:  Negative for dysuria and hematuria.   Musculoskeletal:  Negative for arthralgias and myalgias.   Neurological:  Negative for seizures and syncope.   Hematological:  Negative for adenopathy. Does not bruise/bleed easily.   Psychiatric/Behavioral:  Negative for confusion.        Medications:  The current medication list was reviewed in the EMR    ALLERGIES:  No Known Allergies    Objective       Vitals:    08/19/24 1953 08/19/24 2347 08/20/24 0503 08/20/24 0724   BP: 122/62 116/56  100/52   BP Location: Right arm Right arm  Left arm   Patient Position: Lying Lying  Lying   Pulse:  81     Resp: 16 16  14   Temp: 99.1 °F (37.3 °C) 97.9 °F (36.6 °C)  98.4 °F (36.9 °C)   TempSrc: Oral Oral  Oral   SpO2: 96% 100%  98%   Weight:   72.4 kg (159 lb 9.8 oz)    Height:         Physical Exam    CONSTITUTIONAL:  Vital signs reviewed.  No distress, looks comfortable.  EYES:  Conjunctivae and lids unremarkable.  PERRLA  EARS, NOSE, MOUTH, THROAT:  Ears and nose appear unremarkable.  Lips, teeth, gums appear unremarkable.  RESPIRATORY:  Normal respiratory effort.  Lungs clear to auscultation bilaterally.  CARDIOVASCULAR:  Normal S1, S2.  No murmurs, rubs or gallops.  No significant lower extremity edema.  GASTROINTESTINAL: Abdomen appears unremarkable.  Nontender.  No hepatomegaly.  No splenomegaly.  NEURO:  Cranial nerves 2-12 grossly intact.  No focal deficits.  Appears to have equal strength all 4 extremities.  MUSCULOSKELETAL:  Unremarkable digits/nails.  No cyanosis or clubbing.  SKIN:  Warm.  No rashes.  PSYCHIATRIC:  Normal judgment and insight.  Normal mood and affect.       RECENT LABS:  WBC   Date Value Ref Range Status   08/20/2024 7.80 3.40 - 10.80 10*3/mm3 Final   08/19/2024 9.66 3.40 - 10.80 10*3/mm3 Final   08/18/2024 8.79 3.40 - 10.80 10*3/mm3 Final     Hemoglobin   Date Value Ref Range Status   08/20/2024 7.1 (L) 13.0 - 17.7 g/dL Final   08/19/2024 7.2 (L) 13.0 - 17.7 g/dL Final   08/19/2024 7.5 (L) 13.0 - 17.7 g/dL Final   08/18/2024 7.0 (L) 13.0 - 17.7 g/dL Final   08/18/2024 7.3 (L) 13.0 - 17.7 g/dL Final   08/18/2024 7.3 (L) 13.0 - 17.7 g/dL Final   08/18/2024 7.1 (L) 13.0 - 17.7 g/dL Final   08/17/2024 6.9 (C) 13.0 - 17.7 g/dL Final     Platelets   Date Value Ref Range Status   08/20/2024 437 140 - 450 10*3/mm3 Final   08/19/2024 468 (H) 140 - 450 10*3/mm3 Final   08/18/2024 519 (H) 140 - 450 10*3/mm3 Final     Assessment & Plan  ASSESSMENT/PLAN:  [facility]ER[facility] Main OR/MAIN OR     *Normocytic anemia (reason for admission)  Transferred from his nursing home for a hemoglobin of 6.7  Ferritin 869, iron 14, vitamin B12 914, folic acid greater than 20, haptoglobin 361  He was seen by Dr. Quarles for initial consultation in the office on 8/12/2024.  Ferritin was high, iron low.  His plan was to initiate Procrit every couple of weeks and he has a follow-up appointment scheduled next month.  He has not yet received Procrit.  GI following with consideration of endoscopy  Ferritin 869, 8% saturation.  With the elevated ferritin, IV iron was not given.  Seen by Dr. Quarles on 8/12/2024.  He planned Procrit 20,000 units every 2 weeks for anemia of chronic disease and arranged appointments in the office.  8/19/2024: Procrit 20,000 units for anemia of chronic disease as per Dr. Quarles, his outpatient  "hematologist  Hb 7.1, from 7.2, from 7.3     *Ankylosing spondylitis  On methotrexate which can certainly be contributing to anemia     *Elevated TSH  Now on levothyroxine for hypothyroidism     *Mildly elevated liver labs  Viral hepatitis panel negative  CT imaging pending     *PEG tube in place for nutrition     Elevated IgG level on serum immunofixation but no M spike.  Elevated light chains with a ratio is nearly normal.  Therefore, no evidence for monoclonal gammopathy.    *forde asso uti in the setting of history of urine outflow obstruction requiring chronic forde. On antibiotics through hospitalist service.    *Sacral and ischial pressure wounds.  Surgical debridement 8/20/2024.     RECOMMENDATIONS/PLAN:   GI following  Daily CBC.  Transfuse as needed to maintain hemoglobin greater than 7.  He currently has labs and a Procrit injection scheduled on 8/26 and 9/11 with an appointment with Dr. Quarles on that day.    Electronically signed by Yimi Rivas II, MD at 08/20/24 1022       Justine Roque MD at 08/20/24 0921          General Surgery Progress Note      S: No acute events. Discussed surgery with his daughter who would like to proceed.    O:/52 (BP Location: Left arm, Patient Position: Lying)   Pulse 81   Temp 98.4 °F (36.9 °C) (Oral)   Resp 14   Ht 190.5 cm (75\")   Wt 72.4 kg (159 lb 9.8 oz)   SpO2 98%   BMI 19.95 kg/m²     Intake & Output (last day)         08/19 0701  08/20 0700 08/20 0701  08/21 0700    P.O.      Other 210     NG/     Total Intake(mL/kg) 464 (6.4)     Urine (mL/kg/hr) 500 (0.3)     Total Output 500     Net -36                   GENERAL: awake, alert, no apparent distress  HEENT: normochephalic, atraumatic, moist mucus membranes, clear sclera   CHEST: clear to auscultation, no wheezes, no increased work of breathing  CARDIAC: regular rate and rhythm    EXTREMITIES: no cyanosis, clubbing or edema    SKIN: Warm and moist, no rashes    LABS REVIEWED:   Results from " last 7 days   Lab Units 08/20/24  0537 08/19/24  0802 08/19/24  0059 08/18/24  1545 08/18/24  0759   WBC 10*3/mm3 7.80 9.66  --   --  8.79   HEMOGLOBIN g/dL 7.1* 7.2* 7.5*   < > 7.3*  7.3*   HEMATOCRIT % 23.1* 23.5* 25.1*   < > 23.7*  23.7*   PLATELETS 10*3/mm3 437 468*  --   --  519*    < > = values in this interval not displayed.     Results from last 7 days   Lab Units 08/20/24  0537 08/19/24  0802 08/18/24  0759   SODIUM mmol/L 136 132* 132*   POTASSIUM mmol/L 3.8 3.7 3.7   CHLORIDE mmol/L 105 102 99   CO2 mmol/L 27.0 25.8 26.0   BUN mg/dL 12 14 14   CREATININE mg/dL 0.35* 0.43* 0.55*   CALCIUM mg/dL 8.3* 8.5* 8.7   BILIRUBIN mg/dL 0.2 <0.2 0.3   ALK PHOS U/L 86 90 88   ALT (SGPT) U/L 50* 50* 45*   AST (SGOT) U/L 50* 49* 44*   GLUCOSE mg/dL 93 123* 94     Results from last 7 days   Lab Units 08/17/24  0522   INR  1.52*   APTT seconds 36.1*         A/P: 79 y.o. male with sacral and ischial pressure wounds.  I discussed surgical debridement versus palliative care with the patient's daughter, Joaquina.  I explained to the procedure in detail.  I explained all of the risks (bleeding, infection of the bone, need for further debridement, postoperative pain, and failure of wound to heal), benefits and alternatives.  I also discussed the option of consulting palliative care and focusing on the patient's comfort.  I also explained that I am very concerned that despite debridement these wounds will not heal.  She verbalized understanding and would like to proceed.  I will be coordinating with Dr. Duval with podiatry so that he can debride the patient's heel wound as well.      JUSTINE BARAJAS MD  General, Robotic and Endoscopic Surgery  [facility]    [address], [address]  [place], [place], [address]  P: [phone]  F: [phone]       Electronically signed by Justine Barajas MD at 08/20/24 0924       Sharif Logan MD at 08/19/24 1133           LOS: 0 days     Name: Anthony Gallegos  Age: 79  "y.o.  Sex: male  :  1945  MRN: [id number]         Primary Care Physician: Keshav Eason MD    Subjective   Subjective  Complains of pain in his lower extremities around his wounds, particular right heel.  Also with sacral and ischial wounds.  No acute overnight events.    Objective   Vital Signs  Temp:  [98.2 °F (36.8 °C)-98.8 °F (37.1 °C)] 98.2 °F (36.8 °C)  Heart Rate:  [83] 83  Resp:  [16-20] 16  BP: ()/(42-53) 126/53  Body mass index is 19.4 kg/m².    Objective:  General Appearance:  Comfortable and in no acute distress (Chronically ill, frail, weak and deconditioned).    Vital signs: (most recent): Blood pressure 126/53, pulse 83, temperature 98.2 °F (36.8 °C), temperature source Oral, resp. rate 16, height 190.5 cm (75\"), weight 70.4 kg (155 lb 3.3 oz), SpO2 100%.    Lungs:  Normal effort and normal respiratory rate.  He is not in respiratory distress.  There are decreased breath sounds.    Heart: Normal rate.  Regular rhythm.    Abdomen: Abdomen is soft.  Bowel sounds are normal.   There is no abdominal tenderness.     Extremities: There is no dependent edema or local swelling.    Neurological: Patient is alert and oriented to person, place and time.    Skin:  Warm and dry.  (Wounds of the bilateral lower extremities, particularly the right heel)              Results Review:       I reviewed the patient's new clinical results.    Results from last 7 days   Lab Units 24  0802 24  0059 24  1545 24  0759 24  0023 24  1549 24  0522 24  2115   WBC 10*3/mm3 9.66  --   --  8.79  --   --  8.55 9.35   HEMOGLOBIN g/dL 7.2* 7.5* 7.0* 7.3*  7.3* 7.1* 6.9* 7.5* 6.7*   PLATELETS 10*3/mm3 468*  --   --  519*  --   --  498* 470*     Results from last 7 days   Lab Units 24  0802 24  0759 24  0522   SODIUM mmol/L 132* 132* 134*   POTASSIUM mmol/L 3.7 3.7 4.1   CHLORIDE mmol/L 102 99 101   CO2 mmol/L 25.8 26.0 27.5   BUN mg/dL 14 14 12 "   CREATININE mg/dL 0.43* 0.55* 0.54*   CALCIUM mg/dL 8.5* 8.7 8.9   GLUCOSE mg/dL 123* 94 97     Results from last 7 days   Lab Units 08/17/24  0522   INR  1.52*             Scheduled Meds:   atorvastatin, 40 mg, Oral, Daily  ciprofloxacin, 2 drop, Right Eye, Q4H  collagenase, 1 Application, Topical, BID  epoetin alma delia/alma delia-epbx, 20,000 Units, Subcutaneous, Once  ertapenem, 1,000 mg, Intravenous, Q24H  lansoprazole, 30 mg, Per G Tube, Q AM  levothyroxine, 25 mcg, Oral, Q AM  [START ON 8/21/2024] methotrexate, 2.5 mg, Oral, Once per day on Wednesday Thursday  potassium chloride, 20 mEq, Oral, Daily  senna-docusate sodium, 2 tablet, Oral, BID  sodium chloride, 10 mL, Intravenous, Q12H  sodium hypochlorite, , Topical, BID  terazosin, 1 mg, Per G Tube, Nightly      PRN Meds:     acetaminophen **OR** acetaminophen **OR** acetaminophen    senna-docusate sodium **AND** polyethylene glycol **AND** bisacodyl **AND** bisacodyl    Calcium Replacement - Follow Nurse / BPA Driven Protocol    cyclobenzaprine    HYDROcodone-acetaminophen    Magnesium Standard Dose Replacement - Follow Nurse / BPA Driven Protocol    melatonin    nitroglycerin    ondansetron ODT **OR** ondansetron    Phosphorus Replacement - Follow Nurse / BPA Driven Protocol    Potassium Replacement - Follow Nurse / BPA Driven Protocol    [COMPLETED] Insert Peripheral IV **AND** sodium chloride    sodium chloride    sodium chloride  Continuous Infusions:       Assessment & Plan   Active Hospital Problems    Diagnosis  POA    **Acute on chronic anemia [D64.9]  Yes    Hypothyroidism (acquired) [E03.9]  Yes    Elevated liver function tests [R79.89]  Yes    Indwelling Mariscal catheter present [Z97.8]  Not Applicable    Thyroid function test abnormal [R94.6]  Yes    Dysphagia [R13.10]  Yes    Malnutrition [E46]  Yes    Feeding by G-tube [Z93.1]  Not Applicable    Hyperlipidemia [E78.5]  Yes    UTI (urinary tract infection) [N39.0]  Yes    Enlarged prostate [N40.0]  Yes     Essential hypertension [I10]  Yes    Ankylosing spondylitis [M45.9]  Yes      Resolved Hospital Problems   No resolved problems to display.       Assessment & Plan    Acute on chronic iron deficiency anemia/anemia of chronic disease..  Anemia workup suggestive of chronic disease.  GI and hematology following.  He will receive Procrit today.    Elevated liver function test.  Benign GI examination.  Could be secondary to medication.  CT scan of the abdomen/pelvis is pending as outlined below..  Hepatitis panel is negative.  Stable liver function tests.  Okay to continue on statin for now.  Hypertension.  Good control on no medications.  No evidence of angina or congestive heart failure.  Will monitor.  Right infectious conjunctivitis.  On Cipro eyedrops.  Improving.  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  Urine culture has grown ESBL E. coli and he has been switched to Invanz.  Blood cultures negative.  Awaiting CT of the abdomen and pelvis.  Ankylosing spondylitis.  Continue methotrexate.  Hypothyroidism.  Newly diagnosed.  Initiated low-dose Synthroid and monitor as an outpatient.   Dysphagia/GERD/malnutrition.  Speech recommends puréed and honey thickened liquids.  Nutrition G-tube feeds.  Continue proton pump inhibitors.  Dyslipidemia.  Continue Lipitor.   Bilateral lower extremity wounds/sacral and ischial wounds.  I discussed with the wound care team today who evaluated the patient and recommend general surgery evaluation for potential debridement of the sacral and ischial wounds as well as podiatry evaluation for potential debridement of right heel wound.  Specialty mattress has been ordered.  VTE prophylaxis.  Sequential compression device.      Expected Discharge Date: 8/20/2024; Expected Discharge Time:      Sharif Logan MD  [facility]  08/19/24  11:34 EDT      Electronically signed by  Sharif Logan MD at 08/19/24 1138       Rob, Yimi MUHAMMAD II, MD at 08/19/24 1126            Subjective      REASON FOR FOLLOWUP/CHIEF COMPLAINT: anemia   Anemia  HISTORY OF PRESENT ILLNESS:   No new events overnight.  No bleeding    Past Medical History, Past Surgical History, Social History, Family History have been reviewed and are without significant changes except as mentioned.    Review of Systems   Review of Systems   Constitutional:  Negative for activity change.   HENT:  Negative for nosebleeds and trouble swallowing.    Respiratory:  Negative for shortness of breath and wheezing.    Cardiovascular:  Negative for chest pain and palpitations.   Gastrointestinal:  Negative for constipation, diarrhea and nausea.   Genitourinary:  Negative for dysuria and hematuria.   Musculoskeletal:  Negative for arthralgias and myalgias.   Neurological:  Negative for seizures and syncope.   Hematological:  Negative for adenopathy. Does not bruise/bleed easily.   Psychiatric/Behavioral:  Negative for confusion.        Medications:  The current medication list was reviewed in the EMR    ALLERGIES:  No Known Allergies    Objective       Vitals:    08/17/24 2029 08/17/24 2349 08/18/24 0734 08/18/24 1322   BP: 106/51 124/67 105/57 98/51   BP Location:  Left arm Right arm Right arm   Patient Position:  Lying Lying Lying   Pulse:  81     Resp:  16 20 20   Temp:  98.6 °F (37 °C) 97.9 °F (36.6 °C) 98.6 °F (37 °C)   TempSrc:  Oral Oral Oral   SpO2:  100% 100%    Weight:       Height:         Physical Exam    CONSTITUTIONAL:  Vital signs reviewed.  No distress, looks comfortable.  EYES:  Conjunctivae and lids unremarkable.  PERRLA  EARS, NOSE, MOUTH, THROAT:  Ears and nose appear unremarkable.  Lips, teeth, gums appear unremarkable.  RESPIRATORY:  Normal respiratory effort.  Lungs clear to auscultation bilaterally.  CARDIOVASCULAR:  Normal S1, S2.  No murmurs, rubs or gallops.  No significant lower extremity  edema.  GASTROINTESTINAL: Abdomen appears unremarkable.  Nontender.  No hepatomegaly.  No splenomegaly.  NEURO: Cranial nerves 2-12 grossly intact.  No focal deficits.  Appears to have equal strength all 4 extremities.  MUSCULOSKELETAL:  Unremarkable digits/nails.  No cyanosis or clubbing.  SKIN:  Warm.  No rashes.  PSYCHIATRIC:  Normal judgment and insight.  Normal mood and affect.      RECENT LABS:  WBC   Date Value Ref Range Status   08/18/2024 8.79 3.40 - 10.80 10*3/mm3 Final   08/17/2024 8.55 3.40 - 10.80 10*3/mm3 Final   08/16/2024 9.35 3.40 - 10.80 10*3/mm3 Final     Hemoglobin   Date Value Ref Range Status   08/18/2024 7.3 (L) 13.0 - 17.7 g/dL Final   08/18/2024 7.3 (L) 13.0 - 17.7 g/dL Final   08/18/2024 7.1 (L) 13.0 - 17.7 g/dL Final   08/17/2024 6.9 (C) 13.0 - 17.7 g/dL Final   08/17/2024 7.5 (L) 13.0 - 17.7 g/dL Final   08/16/2024 6.7 (C) 13.0 - 17.7 g/dL Final     Platelets   Date Value Ref Range Status   08/18/2024 519 (H) 140 - 450 10*3/mm3 Final   08/17/2024 498 (H) 140 - 450 10*3/mm3 Final   08/16/2024 470 (H) 140 - 450 10*3/mm3 Final     Assessment & Plan  ASSESSMENT/PLAN:  Anthony El S606/1     *Normocytic anemia (reason for admission)  Transferred from his nursing home for a hemoglobin of 6.7  Ferritin 869, iron 14, vitamin B12 914, folic acid greater than 20, haptoglobin 361  He was seen by Dr. Quarles for initial consultation in the office on 8/12/2024.  Ferritin was high, iron low.  His plan was to initiate Procrit every couple of weeks and he has a follow-up appointment scheduled next month.  He has not yet received Procrit.  GI following with consideration of endoscopy  Ferritin 869, 8% saturation.  With the elevated ferritin, IV iron was not given.  Seen by Dr. Quarles on 8/12/2024.  He planned Procrit 20,000 units every 2 weeks for anemia of chronic disease and arranged appointments in the office.  8/19/2024: Procrit 20,000 units for anemia of chronic disease as per Dr. Quarles, his  outpatient hematologist  Hb 7.2, from 7.3     *Ankylosing spondylitis  On methotrexate which can certainly be contributing to anemia     *Elevated TSH  Now on levothyroxine for hypothyroidism     *Mildly elevated liver labs  Viral hepatitis panel negative  CT imaging pending     *PEG tube in place for nutrition     Elevated IgG level on serum immunofixation but no M spike.  Elevated light chains with a ratio is nearly normal.  Therefore, no evidence for monoclonal gammopathy.    *forde asso uti in the setting of history of urine outflow obstruction requiring chronic forde. On antibiotics through hospitalist service.     RECOMMENDATIONS/PLAN:   GI following  Daily CBC.  Transfuse as needed to maintain hemoglobin greater than 7.  Procrit 20,000 units today.  He currently has labs and a Procrit injection scheduled on 8/26 and 9/11 with an appointment with Dr. Quarles on that day.    Electronically signed by Yimi Rivas II, MD at 08/19/24 1137       Kathy Moy PA-C at 08/19/24 0835       Attestation signed by Fernando Payton MD at 08/19/24 8119    I have reviewed this documentation and agree.                  [facility]  Inpatient Progress Note    Reason for Follow Up:  Anemia     Subjective     Interval History:   Seen this morning.  He denies any abdominal pain, nausea, vomiting.  Spoke to nurse who denies any signs of overt GI bleeding.  Fecal occult was negative.  Patient request I talked to his daughter.  Called his daughterWhit this afternoon and discussed negative fecal occult.    Current Facility-Administered Medications:     acetaminophen (TYLENOL) tablet 650 mg, 650 mg, Oral, Q4H PRN **OR** acetaminophen (TYLENOL) 160 MG/5ML oral solution 650 mg, 650 mg, Oral, Q4H PRN **OR** acetaminophen (TYLENOL) suppository 650 mg, 650 mg, Rectal, Q4H PRN, Luther Rojas MD    atorvastatin (LIPITOR) tablet 40 mg, 40 mg, Oral, Daily, Luther Rojas MD, 40 mg at 08/18/24  0855    sennosides-docusate (PERICOLACE) 8.6-50 MG per tablet 2 tablet, 2 tablet, Oral, BID, 2 tablet at 08/18/24 2029 **AND** polyethylene glycol (MIRALAX) packet 17 g, 17 g, Oral, Daily PRN **AND** bisacodyl (DULCOLAX) EC tablet 5 mg, 5 mg, Oral, Daily PRN **AND** bisacodyl (DULCOLAX) suppository 10 mg, 10 mg, Rectal, Daily PRN, Luther Rojas MD    Calcium Replacement - Follow Nurse / BPA Driven Protocol, , Does not apply, PRN, Luther Rojas MD    cefTRIAXone (ROCEPHIN) 2,000 mg in sodium chloride 0.9 % 100 mL MBP, 2,000 mg, Intravenous, Q24H, Luther Rojas MD, Last Rate: 200 mL/hr at 08/18/24 1505, 2,000 mg at 08/18/24 1505    ciprofloxacin (CILOXAN) 0.3 % ophthalmic solution 2 drop, 2 drop, Right Eye, Q4H, Luther Rojas MD, 2 drop at 08/19/24 0456    cyclobenzaprine (FLEXERIL) tablet 5 mg, 5 mg, Oral, Nightly PRN, Luther Rojas MD    HYDROcodone-acetaminophen (NORCO) 5-325 MG per tablet 1 tablet, 1 tablet, Oral, Q6H PRN, Luther Rojas MD    lansoprazole (PREVACID SOLUTAB) disintegrating tablet Tablet Delayed Release Dispersible 30 mg, 30 mg, Per G Tube, Q AM, Luther Rojas MD, 30 mg at 08/19/24 0618    levothyroxine (SYNTHROID, LEVOTHROID) tablet 25 mcg, 25 mcg, Oral, Q AM, Luther Rojas MD, 25 mcg at 08/19/24 0618    Magnesium Standard Dose Replacement - Follow Nurse / BPA Driven Protocol, , Does not apply, PRN, Luther Rojas MD    melatonin tablet 5 mg, 5 mg, Oral, Nightly PRN, Luther Rojas MD    [START ON 8/21/2024] methotrexate tablet 2.5 mg, 2.5 mg, Oral, Once per day on Wednesday Thursday, Luther Rojas MD    nitroglycerin (NITROSTAT) SL tablet 0.4 mg, 0.4 mg, Sublingual, Q5 Min PRN, Luther Rojas MD    ondansetron ODT (ZOFRAN-ODT) disintegrating tablet 4 mg, 4 mg, Oral, Q6H PRN **OR** ondansetron (ZOFRAN) injection 4 mg, 4 mg, Intravenous, Q6H PRN, Luther Rojas MD    Phosphorus Replacement - Follow Nurse / BPA Driven Protocol, , Does not apply, PRN,  Luther Rojas MD    potassium chloride (KLOR-CON) packet 20 mEq, 20 mEq, Oral, Daily, Luther Rojas MD, 20 mEq at 08/18/24 1113    Potassium Replacement - Follow Nurse / BPA Driven Protocol, , Does not apply, PRN, Luther Rojas MD    [COMPLETED] Insert Peripheral IV, , , Once **AND** sodium chloride 0.9 % flush 10 mL, 10 mL, Intravenous, PRN, Luther Rojas MD    sodium chloride 0.9 % flush 10 mL, 10 mL, Intravenous, Q12H, Luther Rojas MD, 10 mL at 08/18/24 2029    sodium chloride 0.9 % flush 10 mL, 10 mL, Intravenous, PRN, Luther Rojas MD    sodium chloride 0.9 % infusion 40 mL, 40 mL, Intravenous, PRN, Luther Rojas MD    terazosin (HYTRIN) capsule 1 mg, 1 mg, Per G Tube, Nightly, Luther Rojas MD      Objective     Vital Signs  Temp:  [98.2 °F (36.8 °C)-98.8 °F (37.1 °C)] 98.2 °F (36.8 °C)  Heart Rate:  [83] 83  Resp:  [16-20] 16  BP: ()/(42-53) 126/53  Body mass index is 19.4 kg/m².    Intake/Output Summary (Last 24 hours) at 8/19/2024 0835  Last data filed at 8/19/2024 0618  Gross per 24 hour   Intake 1179 ml   Output 550 ml   Net 629 ml     No intake/output data recorded.     Physical Exam:   General: patient awake, alert and cooperative   Cardiovascular: regular rhythm and rate   Pulm: regular and unlabored   Abdomen: soft, nontender, nondistended; normal bowel sounds, PEG tube in place with no surrounding erythema or swelling.     Results Review:     I reviewed the patient's new clinical results.    Results from last 7 days   Lab Units 08/19/24  0059 08/18/24  1545 08/18/24  0759 08/17/24  1549 08/17/24  0522 08/16/24  2115   WBC 10*3/mm3  --   --  8.79  --  8.55 9.35   HEMOGLOBIN g/dL 7.5* 7.0* 7.3*  7.3*   < > 7.5* 6.7*   HEMATOCRIT % 25.1* 23.3* 23.7*  23.7*   < > 23.7* 21.6*   PLATELETS 10*3/mm3  --   --  519*  --  498* 470*    < > = values in this interval not displayed.     Results from last 7 days   Lab Units 08/18/24  0759 08/17/24  0522   SODIUM mmol/L 132*  134*   POTASSIUM mmol/L 3.7 4.1   CHLORIDE mmol/L 99 101   CO2 mmol/L 26.0 27.5   BUN mg/dL 14 12   CREATININE mg/dL 0.55* 0.54*   CALCIUM mg/dL 8.7 8.9   BILIRUBIN mg/dL 0.3 0.3   ALK PHOS U/L 88 85   ALT (SGPT) U/L 45* 61*   AST (SGOT) U/L 44* 62*   GLUCOSE mg/dL 94 97     Results from last 7 days   Lab Units 08/17/24  0522   INR  1.52*     Lab Results   Lab Value Date/Time    LIPASE 62 (H) 03/25/2024 1723    LIPASE 60 12/20/2023 1029    LIPASE 101 (H) 07/14/2017 1257    LIPASE 86 (H) 06/29/2017 1448       Radiology:  CT Abdomen Pelvis With Contrast    (Results Pending)       Assessment & Plan     Active Hospital Problems    Diagnosis     **Acute on chronic anemia     Hypothyroidism (acquired)     Elevated liver function tests     Indwelling Mariscal catheter present     Thyroid function test abnormal     Dysphagia     Malnutrition     Feeding by G-tube     Hyperlipidemia     UTI (urinary tract infection)     Enlarged prostate     Essential hypertension     Ankylosing spondylitis        Assessment:  Anemia  protein calorie malnutrition  Iron deficiency  Dysphagia status post G-tube  Malnutrition    All problems are new to me today     Plan:  Patient has been seen by hematology who is starting Procrit  Fecal occult was negative for blood and he has had no signs of overt GI bleeding.  Had lengthy discussion with daughter over the phone and she would prefer to manage conservatively and avoid endoscopic procedures unless he has signs of bleeding.  Recommend continuing to monitor hemoglobin and transfusing as needed per primary team  Recommend close follow-up with NOHEMI Dalton w/ GI following d/c - appointment already scheduled for 9/11 - advise patient keep this.     At this time, GI will sign off.  Please not hesitate to call back if needed.      Patient and plan of care discussed with attending, Dr. Ttaa Moy PA-C        Electronically signed by Fernando Payton MD at 08/19/24 2193        Luther Rojas MD at 24 1035              Name: Anthony Gallegos ADMIT: 2024   : 1945  PCP: Keshav Eason MD    MRN: [id number] LOS: 0 days   AGE/SEX: 79 y.o. male  ROOM: [facility]     Subjective   Subjective   Patient reports no abdominal pain.  The normal bowel movement without fresh bright blood per rectum or melena.  No choking/odynophagia/dysphagia with modified diet.  Clear urine in the Mariscal bag.  No fever or chills.    Review of Systems  Cardiovascular/respiratory.  No chest pain/no palpitations/no cough/no shortness of breath    Objective   Objective   Vital Signs  Temp:  [97.9 °F (36.6 °C)-99 °F (37.2 °C)] 97.9 °F (36.6 °C)  Heart Rate:  [77-81] 81  Resp:  [16-20] 20  BP: (103-124)/(51-67) 105/57  SpO2:  [100 %] 100 %  on   ;   Device (Oxygen Therapy): room air    Intake/Output Summary (Last 24 hours) at 2024 1035  Last data filed at 2024 0908  Gross per 24 hour   Intake 447 ml   Output 600 ml   Net -153 ml     Body mass index is 17.53 kg/m².      24  0730   Weight: 63.6 kg (140 lb 3.4 oz)     Physical Exam  General.  Elderly gentleman.  He is alert and oriented x 4.  In no apparent pain/distress/diaphoresis.  Normal mood and affect.  Eyes.  Pupils equal round and reactive.  Intact extraocular musculature.  No pallor or jaundice.      Oral cavity.  Mucous membrane  Neck.  Stiff (old) no JVD.  No lymphadenopathy or thyromegaly.  Cardiovascular.  Regular rate and rhythm and grade 2 systolic murmur.  Chest.  Clear to auscultation bilaterally with no added sounds.  Abdomen.  Soft lax.  G-tube in place.  No organomegaly.  No guarding or rebound.  G-tube in place with healthy side.  .  No CVA tenderness.  Mariscal catheter in place with clear urine.  CNS.  No acute focal neurological deficits.  Extremities.  No clubbing/cyanosis/edema.  Dressed wound on both lower extremities.        Results Review:      Results from last 7 days   Lab Units 24  0759 24  0522  "  SODIUM mmol/L 132* 134*   POTASSIUM mmol/L 3.7 4.1   CHLORIDE mmol/L 99 101   CO2 mmol/L 26.0 27.5   BUN mg/dL 14 12   CREATININE mg/dL 0.55* 0.54*   GLUCOSE mg/dL 94 97   CALCIUM mg/dL 8.7 8.9   AST (SGOT) U/L 44* 62*   ALT (SGPT) U/L 45* 61*     Estimated Creatinine Clearance: 96.4 mL/min (A) (by C-G formula based on SCr of 0.55 mg/dL (L)).                  Results from last 7 days   Lab Units 08/17/24  1549   TSH uIU/mL 6.800*               Invalid input(s): \"LDLCALC\"  Results from last 7 days   Lab Units 08/18/24  0759 08/18/24  0023 08/17/24  1549 08/17/24  0522 08/16/24  2115 08/12/24  0920   WBC 10*3/mm3 8.79  --   --  8.55 9.35 11.57*   HEMOGLOBIN g/dL 7.3*  7.3* 7.1* 6.9* 7.5* 6.7* 8.9*   HEMATOCRIT % 23.7*  23.7* 22.6* 22.4* 23.7* 21.6* 27.4*   PLATELETS 10*3/mm3 519*  --   --  498* 470* 364   MCV fL 83.2  --   --  83.5 84.0 83.0   MCH pg 25.6*  --   --  26.4* 26.1* 27.0   MCHC g/dL 30.8*  --   --  31.6 31.0* 32.5   RDW % 14.4  --   --  14.6 14.8 15.6*   RDW-SD fl 43.2  --   --  43.9 44.8 47.0   MPV fL 8.0  --   --  8.2 8.6 8.4   NEUTROPHIL % % 72.9  --   --  63.0 69.0 63.3   LYMPHOCYTE % % 13.5*  --   --  20.1 16.0* 22.1   MONOCYTES % % 10.4  --   --  11.8 12.2* 8.6   EOSINOPHIL % % 2.0  --   --  3.9 2.2 3.6   BASOPHIL % % 0.7  --   --  0.7 0.4 0.7   IMM GRAN % % 0.5  --   --  0.5 0.2 1.7*   NEUTROS ABS 10*3/mm3 6.41  --   --  5.39 6.44 7.32*   LYMPHS ABS 10*3/mm3 1.19  --   --  1.72 1.50 2.56   MONOS ABS 10*3/mm3 0.91*  --   --  1.01* 1.14* 0.99*   EOS ABS 10*3/mm3 0.18  --   --  0.33 0.21 0.42*   BASOS ABS 10*3/mm3 0.06  --   --  0.06 0.04 0.08   IMMATURE GRANS (ABS) 10*3/mm3 0.04  --   --  0.04 0.02 0.20*   NRBC /100 WBC 0.0  --   --  0.0 0.0 0.0     Results from last 7 days   Lab Units 08/17/24  0522   INR  1.52*   APTT seconds 36.1*                             Results from last 7 days   Lab Units 08/17/24  0536   NITRITE UA  Positive*   WBC UA /HPF Too Numerous to Count*   BACTERIA UA /HPF 2+* "   IVAN EPITHEL UA /HPF None Seen           Imaging:  Imaging Results (Last 24 Hours)       ** No results found for the last 24 hours. **               I reviewed the patient's new clinical results / labs / tests / procedures      Assessment/Plan     Active Hospital Problems    Diagnosis  POA    **Acute on chronic anemia [D64.9]  Yes    Hypothyroidism (acquired) [E03.9]  Yes    Elevated liver function tests [R79.89]  Yes    Indwelling Mariscal catheter present [Z97.8]  Not Applicable    Thyroid function test abnormal [R94.6]  Yes    Dysphagia [R13.10]  Yes    Malnutrition [E46]  Yes    Feeding by G-tube [Z93.1]  Not Applicable    Hyperlipidemia [E78.5]  Yes    UTI (urinary tract infection) [N39.0]  Yes    Enlarged prostate [N40.0]  Yes    Essential hypertension [I10]  Yes    Ankylosing spondylitis [M45.9]  Yes      Resolved Hospital Problems   No resolved problems to display.         Acute on chronic iron deficiency anemia/anemia of chronic disease..  Anemia workup suggestive of chronic disease and iron deficiency..  Hemoglobin is worse than baseline.  Awaiting Hemoccult stool.  GI consult noted and appreciated.  GI considering endoscopies.  Hemoglobin is stable.  Patient hemodynamically stable.  Awaiting hematology oncology consult.  Hold on transfusion unless hematology oncology believes it is needed.  Monitor H&H.  Continue proton pump inhibitor.  Elevated liver function test.  Benign GI examination.  Could be secondary to medication.  Awaiting CT scan of the abdomen.  Hepatitis panel is negative.  Stable liver function test..  Monitor liver function test.  Hypertension.  Good control on no medications.  No evidence of angina or congestive heart failure.  Will monitor.  Right infectious conjunctivitis.  On Cipro eyedrops.  Improving.  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  Urine culture is pending.  Currently on Rocephin.   Awaiting CT of the abdomen and pelvis.  Ankylosing spondylitis.  Continue methotrexate.  Hypothyroidism.  Newly diagnosed.  Initiate low-dose Synthroid and monitor as an outpatient.   Dysphagia/GERD/malnutrition.  Speech recommends puréed and honey thickened liquids.  Nutrition G-tube feeds.  Continue proton pump inhibitors.  Dyslipidemia.  Continue Lipitor.   Bilateral lower extremity wounds..  The wound nurse on board for local care.    VTE prophylaxis.  Sequential compression device.        Discussed My findings and plan of treatment with the patient  Disposition.  Eventually back to skilled facility once medically stable.        Luther Rojas MD  [facility]  08/18/24  10:35 EDT           Electronically signed by Luther Rojas MD at 08/18/24 1044          Consult Notes (all)        Renny Duval DPM at 08/20/24 0759        Consult Orders    1. Inpatient Podiatry Consult [[id number]] ordered by Sharif Logan MD at 08/19/24 1133                     Podiatry Consult Note      Patient: Anthony Gallegos Admit Date: 08/17/2024    Age: 79 y.o.   PCP: Keshav Eason MD    MRN: [id number]  Room: [facility]        Subjective     Chief Complaint     Chief Complaint   Patient presents with    Abnormal Lab    Urinary Tract Infection        HPI     79M with PMHX of immobility, anemia,DISH, ankylosing spondylitis, G tube feeds, presents with multiple pressure wounds. He is currently in a long term facility. He is somnolent today provides minimal history.    Past Medical History     Past Medical History:   Diagnosis Date    Abscess of scrotum     MARTITA (acute kidney injury)     Altered mental state     Arthritis     AS (ankylosing spondylitis)     History of MRSA infection     Hypertension     Leukocytosis     Tetrahydrocannabinol (THC) use disorder, mild, abuse 12/20/2023    Uveitis         Past Surgical History:   Procedure Laterality Date    COLONOSCOPY      ENDOSCOPY W/ PEG TUBE  PLACEMENT N/A 3/29/2024    Procedure: ESOPHAGOGASTRODUODENOSCOPY WITH PERCUTANEOUS ENDOSCOPIC GASTROSTOMY TUBE INSERTION;  Surgeon: Khadar Broderick MD;  [facility] ENDOSCOPY;  Service: General;  Laterality: N/A;  PRE/POST - DYSPHAGIA    ROTATOR CUFF REPAIR Right     TOTAL HIP ARTHROPLASTY Left 2014        No Known Allergies     Social History     Tobacco Use   Smoking Status Never   Smokeless Tobacco Never        Objective   Physical Exam    Vitals:    08/20/24 0724   BP: 100/52   Pulse:    Resp: 14   Temp: 98.4 °F (36.9 °C)   SpO2: 98%        Dermatology: Skin thin atrophic. Large posterior heel ulcer with depth to achilles and calcaneus noted to the left heel. The is noted significant fibrous tissue and necrotic wound margins. There is periwound erythema. No streaking cellulitis. No abscess noted.     Vascular: DP and PT nonpalpable bilateral. Cap refill 3-5 seconds all digits bilateral. Absent hair growth to lower extremities    Neurological: Sensation grossly intact to light touch    Musckuloskeletal: Active flexion and extension of his digits bilateral feet.    Labs     Lab Results   Component Value Date    HGBA1C 5.80 (H) 01/23/2024    POCGLU 101 08/20/2024    SEDRATE 57 (H) 12/20/2023        CBC:      Lab 08/20/24  0537 08/19/24  0802 08/18/24  1545 08/18/24  0759 08/17/24  1549 08/17/24  0522 08/16/24  2115   WBC 7.80 9.66  --  8.79  --  8.55 9.35   HEMOGLOBIN 7.1* 7.2*   < > 7.3*  7.3*   < > 7.5* 6.7*   HEMATOCRIT 23.1* 23.5*   < > 23.7*  23.7*   < > 23.7* 21.6*   PLATELETS 437 468*  --  519*  --  498* 470*   NEUTROS ABS 5.32 7.18*  --  6.41  --  5.39 6.44   IMMATURE GRANS (ABS) 0.04 0.05  --  0.04  --  0.04 0.02   LYMPHS ABS 1.18 1.16  --  1.19  --  1.72 1.50   MONOS ABS 0.88 1.03*  --  0.91*  --  1.01* 1.14*   EOS ABS 0.34 0.18  --  0.18  --  0.33 0.21   MCV 84.6 84.2  --  83.2  --  83.5 84.0    < > = values in this interval not displayed.          Results for orders placed or performed  during the hospital encounter of 08/17/24   Blood Culture - Blood, Arm, Right    Specimen: Arm, Right; Blood   Result Value Ref Range    Blood Culture No growth at 2 days         CT Abdomen Pelvis With Contrast  Narrative: CT ABDOMEN PELVIS W CONTRAST-     INDICATIONS: Urinary tract infection, history of urine outflow  obstruction     TECHNIQUE: Radiation dose reduction techniques were utilized, including  automated exposure control and exposure modulation based on body size.  Enhanced ABDOMEN AND PELVIS CT     COMPARISON: 6/18/2024     FINDINGS:     Attenuation artifact from left hip arthroplasty hardware partly obscures  structures in the pelvis, and assessment for inflammatory changes in the  abdomen and pelvis is significantly limited by paucity of mesenteric  fat.     Right renal cyst is again demonstrated. No hydronephrosis. Urinary  bladder is catheterized, mostly empty, limiting its assessment.     Otherwise unremarkable appearance of the liver, spleen, adrenal glands,  pancreas, kidneys, bladder.     No bowel obstruction. Moderate colonic fecal retention is present, with  suspected rectal stool impaction. Presacral edema raises suspicion for  proctitis. Correlate clinically. Percutaneous gastrostomy tube is in  place.     No free intraperitoneal gas. Minimal nonspecific pelvic free fluid.     A 1.2 cm short axis left para-aortic lymph node on axial image 54 is  mildly prominent, nonspecific, could be reactive in nature or  potentially evidence of neoplasm, but appears stable.     Abdominal aorta is not aneurysmal. Aortic and other arterial  calcifications are present.     The lung bases scarring and atelectasis in both lungs, with chronic  pleural-parenchymal change at the right anterior costophrenic angle.     Degenerative changes are seen in the spine. No acute fracture is  identified.           Impression:       1. Possible proctitis/rectal stool impaction, correlate clinically.  Minimal pelvic free  fluid. Follow-up as indications persist.     2. No obstructive uropathy.     3. Mildly prominent nonspecific para-aortic lymph node.     This report was finalized on 8/19/2024 7:43 PM by Dr. Len Kennedy M.D on Workstation: [id number]          Assessment/Plan     79M with zafar grade 2/3 decubitus ulcer left heel.    -Patient with significant heel ulceration. Dressing changed today with betadine wet to dry. Remain in offloading bed boot at all times  -Patient with noted anemia hgb 7.1.   -There would be benefit to a surgical debridement of his left heel. Dr. Roque currently considering debridement of patients sacral wound. To limit trips to the operating room would consider following her debridement. This was discussed with patients daughter via phone. We discussed palliative wound care vs operative debridement and risks vs benefits. She would like to proceed with operative debridement.   -Difficult task to heal his heel ulcer given nutritional status and other comorbidity present.   -Xray left calcaneus was ordered, concern for possible chronic osteomyelitis of the calcaneus.   -Please contact with any questions thank you        Rneny Duval DPM  [facility]  Office: [phone]     Electronically signed by Renny Duval DPM at 08/20/24 1046       Justine Roque MD at 08/19/24 2023        Consult Orders    1. Inpatient General Surgery Consult [[id number]] ordered by Sharif Logan MD at 08/19/24 1133                 General Surgery Consultation    Consulting Physician: Justine Roque Md    Reason for consultation: Sacral and ischial pressure wound    CC: Immobility    HPI:   The patient is a very pleasant 79 y.o. male with immobility due to weakness who lives in a facility and has worsening pressure wounds on the sacrum and left ischium.  He has had a feeding tube placed earlier this year in hopes of improving his nutrition, but despite this he has failed to improve  significantly.    Past Medical History:  Past Medical History:   Diagnosis Date    Abscess of scrotum     MARTITA (acute kidney injury)     Altered mental state     Arthritis     AS (ankylosing spondylitis)     History of MRSA infection     Hypertension     Leukocytosis     Tetrahydrocannabinol (THC) use disorder, mild, abuse 12/20/2023    Uveitis        Past Surgical History:  Past Surgical History:   Procedure Laterality Date    COLONOSCOPY      ENDOSCOPY W/ PEG TUBE PLACEMENT N/A 3/29/2024    Procedure: ESOPHAGOGASTRODUODENOSCOPY WITH PERCUTANEOUS ENDOSCOPIC GASTROSTOMY TUBE INSERTION;  Surgeon: Khadar Broderick MD;  Location: [facility] ENDOSCOPY;  Service: General;  Laterality: N/A;  PRE/POST - DYSPHAGIA    ROTATOR CUFF REPAIR Right     TOTAL HIP ARTHROPLASTY Left 2014       Medications:  Medications Prior to Admission   Medication Sig Dispense Refill Last Dose    acetaminophen (TYLENOL) 325 MG tablet Take 2 tablets by mouth Every 4 (Four) Hours As Needed for Mild Pain.   8/17/2024    Ascorbic Acid (VITAMIN C PO) Daily.   8/16/2024    atorvastatin (LIPITOR) 40 MG tablet    8/16/2024    bisacodyl (DULCOLAX) 10 MG suppository Insert 1 suppository into the rectum Daily As Needed for Constipation (Use if bisacodyl oral is ineffective).   Past Month    bisacodyl (DULCOLAX) 5 MG EC tablet Take 1 tablet by mouth Daily As Needed for Constipation (Use if polyethylene glycol is ineffective).   Past Week    calcium carbonate (TUMS) 500 MG chewable tablet Chew 2 tablets 2 (Two) Times a Day As Needed for Heartburn.   8/16/2024    cyclobenzaprine (FLEXERIL) 10 MG tablet TAKE 0.5   1 TABLET BY ORAL ROUTE AT BEDTIME AS NEEDED   8/16/2024    Effer-K 20 MEQ effervescent tablet    8/16/2024    HYDROcodone-acetaminophen (NORCO) 5-325 MG per tablet    Past Month    lansoprazole (PREVACID SOLUTAB) 30 MG Tablet Delayed Release Dispersible disintegrating tablet Administer 1 tablet per G tube Every Morning.   8/16/2024    melatonin 3  MG tablet Take 1 tablet by mouth At Night As Needed for Sleep.   8/16/2024    Menthol-Zinc Oxide 0.44-20.6 % ointment Apply 1 Application topically to the appropriate area as directed Every 12 (Twelve) Hours.   8/16/2024    methotrexate 2.5 MG tablet Take 1 tablet by mouth 1 (One) Time Per Week. Take 2.5 mg on Wednesday and 2.5 mg on Thursday.  Do not take any medication on Friday through Tuesday.   8/16/2024    mupirocin (BACTROBAN) 2 % ointment Apply 1 Application topically to the appropriate area as directed Every 12 (Twelve) Hours. Apply to Penis Abrasion for 3 more days   8/16/2024    Omega-3 Fatty Acids (OMEGA 3 PO) Take 1 capsule by mouth Daily.   8/16/2024    potassium chloride (KAYCIEL) 20 mEq/15 mL solution Take 15 mL by mouth Daily.   8/16/2024    sennosides-docusate (PERICOLACE) 8.6-50 MG per tablet Take 2 tablets by mouth 2 (Two) Times a Day As Needed for Constipation.   Past Week    terazosin (HYTRIN) 1 MG capsule Administer 1 capsule per G tube Every Night.   8/16/2024    castor oil-balsam peru (VENELEX) ointment Apply 1 Application topically to the appropriate area as directed Every 12 (Twelve) Hours. Apply to foot, heel, and ankle wounds per wound care order.   Unknown    polyethylene glycol (MIRALAX) 17 g packet Take 17 g by mouth Daily As Needed (Use if senna-docusate is ineffective).   More than a month    VITAMIN D PO Daily.   More than a month       Allergies: No Known Allergies    Social History:     Social History     Socioeconomic History    Marital status:     Number of children: 2   Tobacco Use    Smoking status: Never    Smokeless tobacco: Never   Vaping Use    Vaping status: Never Used   Substance and Sexual Activity    Alcohol use: No    Drug use: No    Sexual activity: Defer       Family History:     Family History   Problem Relation Age of Onset    Alzheimer's disease Mother     Colon cancer Neg Hx     Colon polyps Neg Hx     Crohn's disease Neg Hx     Irritable bowel syndrome  Neg Hx     Ulcerative colitis Neg Hx        Review of Systems:  Constitutional: denies any weight changes, fatigue, or weakness  Eyes: denies blurred/double vision or scleral icterus  Cardiovascular: denies chest pain, palpitations, or edemas  Respiratory: denies cough, sputum, or dyspnea  Gastrointestinal:   Denies abdominal pain, nausea or vomiting  Genitourinary: denies dysuria or hematuria  Endocrine: denies cold intolerance, lethargy, or flushing  Hematologic: denies excessive bruising or bleeding  Musculoskeletal: denies weakness, joint swelling, joint pain, or stiffness  Neurologic: denies seizures, CVA, paresthesia, or peripheral neuropathy  Skin: denies change in nevi, rashes, masses, or jaundice     All other systems reviewed and were negative.    Physical Exam:   Vitals:    08/19/24 1953   BP: 122/62   Pulse:    Resp: 16   Temp: 99.1 °F (37.3 °C)   SpO2: 96%     BMI: 19  GENERAL: awake and alert, cachectic, ill-appearing, no acute distress, oriented to person, place, and time  HEENT: Temporal wasting, atraumatic, no scleral icterus, moist mucous membranes  NECK: Supple, there is no thyromegaly or lymphadenopathy  RESPIRATORY: clear to auscultation, no wheezes, rales or rhonchi, symmetric air entry  CARDIOVASCULAR: regular rate and rhythm    GASTROINTESTINAL: Soft, nondistended  MUSCULOSKELETAL: no cyanosis, clubbing, or edema   NEUROLOGIC: alert and oriented, normal speech, cranial nerves 2-12 grossly intact, no focal deficits   SKIN: The ischial wound has a black eschar and foul smell, the sacral wound has some good granulation tissue but also has some fibrinous material      Diagnostic work-up:     Pertinent labs I personally reviewed:   Results from last 7 days   Lab Units 08/19/24  0802 08/18/24  1545 08/18/24  0759 08/17/24  1549 08/17/24  0522 08/16/24  2115   WBC 10*3/mm3 9.66  --  8.79  --  8.55 9.35   HEMOGLOBIN g/dL 7.2*   < > 7.3*  7.3*   < > 7.5* 6.7*   HEMATOCRIT % 23.5*   < > 23.7*  23.7*    < > 23.7* 21.6*   PLATELETS 10*3/mm3 468*  --  519*  --  498* 470*    < > = values in this interval not displayed.     Results from last 7 days   Lab Units 08/19/24  0802 08/18/24  0759 08/17/24  0522   SODIUM mmol/L 132* 132* 134*   POTASSIUM mmol/L 3.7 3.7 4.1   CHLORIDE mmol/L 102 99 101   CO2 mmol/L 25.8 26.0 27.5   BUN mg/dL 14 14 12   CREATININE mg/dL 0.43* 0.55* 0.54*   CALCIUM mg/dL 8.5* 8.7 8.9   BILIRUBIN mg/dL <0.2 0.3 0.3   ALK PHOS U/L 90 88 85   ALT (SGPT) U/L 50* 45* 61*   AST (SGOT) U/L 49* 44* 62*   GLUCOSE mg/dL 123* 94 97       Imaging:  None    Assessment and plan:   The patient is a 79 y.o. male with sacral and ischial pressure wounds.  The patient answered all questions appropriately and stated that he would be agreeable to surgical debridement if it was necessary, but he also wanted me to discuss this with his daughter.  I will go ahead and put him on the schedule for tomorrow and call his daughter first thing in the morning to discuss the surgery in detail and see if they would like to proceed.      Justine Roque MD  General, Robotic, and Endoscopic Surgery  [facility]     [address] [place] [address], [address]  [place], [place], [facility]  P: [phone]  F: [phone]     Electronically signed by Justine Roque MD at 08/19/24 6725       Haider Mckeon MD at 08/18/24 0954        Consult Orders    1. Inpatient Hematology & Oncology Consult [[id number]] ordered by Luther Rojas MD at 08/17/24 1443                 [facility] INITIAL INPATIENT CONSULTATION NOTE    REASON FOR CONSULTATION: Anemia    HISTORY OF PRESENT ILLNESS:  Anthony Gallegos is a 79 y.o. male who we are asked to see today in consultation for anemia    The patient has a past medical history of ankylosing spondylitis, hypertension, BPH with an indwelling Mariscal catheter, dysphagia, malnutrition, G-tube placement.    The patient presented with a hemoglobin of 6.7 at his nursing home.       He was seen by   Willam for initial consultation in the office on 8/12/2024.  Ferritin was high, iron low.  His plan was to initiate Procrit every couple of weeks and he has a follow-up appointment scheduled next month.  He has not yet received Procrit.    Hemoglobin has stabilized.  No obvious bleeding.  GI has evaluated.    Patient has no complaints.    Past Medical History:   Diagnosis Date    Abscess of scrotum     MARTITA (acute kidney injury)     Altered mental state     Arthritis     AS (ankylosing spondylitis)     History of MRSA infection     Hypertension     Leukocytosis     Tetrahydrocannabinol (THC) use disorder, mild, abuse 12/20/2023    Uveitis        Past Surgical History:   Procedure Laterality Date    COLONOSCOPY      ENDOSCOPY W/ PEG TUBE PLACEMENT N/A 3/29/2024    Procedure: ESOPHAGOGASTRODUODENOSCOPY WITH PERCUTANEOUS ENDOSCOPIC GASTROSTOMY TUBE INSERTION;  Surgeon: Khadar Broderick MD;  Location: [facility] ENDOSCOPY;  Service: General;  Laterality: N/A;  PRE/POST - DYSPHAGIA    ROTATOR CUFF REPAIR Right     TOTAL HIP ARTHROPLASTY Left 2014       SOCIAL HISTORY:   reports that he has never smoked. He has never used smokeless tobacco. He reports that he does not drink alcohol and does not use drugs.    FAMILY HISTORY:  family history includes Alzheimer's disease in his mother.    ALLERGIES:  No Known Allergies    MEDICATIONS:  As listed in the electronic medical record.    Review of Systems   Constitutional:  Positive for fatigue.   Neurological:  Positive for weakness.   All other systems reviewed and are negative.      Vitals:    08/17/24 2000 08/17/24 2029 08/17/24 2349 08/18/24 0734   BP: 103/52 106/51 124/67 105/57   BP Location: Left arm  Left arm Right arm   Patient Position: Lying  Lying Lying   Pulse: 77  81    Resp: 18  16 20   Temp: 99 °F (37.2 °C)  98.6 °F (37 °C) 97.9 °F (36.6 °C)   TempSrc: Oral  Oral Oral   SpO2: 100%  100% 100%   Weight:       Height:           Chronically ill-appearing elderly   gentleman wearing sunglasses, in no acute distress.  Normal respiratory effort.  Abdomen with G-tube in place.  Extremities warm well-perfused without edema.  Skin warm and dry.    DIAGNOSTIC DATA:  Results from last 7 days   Lab Units 08/18/24  0759   WBC 10*3/mm3 8.79   HEMOGLOBIN g/dL 7.3*  7.3*   HEMATOCRIT % 23.7*  23.7*   PLATELETS 10*3/mm3 519*     Lab Results   Component Value Date    NEUTROABS 6.41 08/18/2024     Results from last 7 days   Lab Units 08/18/24  0759   SODIUM mmol/L 132*   POTASSIUM mmol/L 3.7   CHLORIDE mmol/L 99   CO2 mmol/L 26.0   BUN mg/dL 14   CREATININE mg/dL 0.55*   GLUCOSE mg/dL 94   CALCIUM mg/dL 8.7     Results from last 7 days   Lab Units 08/17/24  0522   INR  1.52*   APTT seconds 36.1*           IMAGING:    None reviewed    ASSESSMENT:  This is a 79 y.o. male with:    *Normocytic anemia  Transferred from his nursing home for a hemoglobin of 6.7  Ferritin 869, iron 14, vitamin B12 914, folic acid greater than 20, haptoglobin 361  He was seen by Dr. Quarles for initial consultation in the office on 8/12/2024.  Ferritin was high, iron low.  His plan was to initiate Procrit every couple of weeks and he has a follow-up appointment scheduled next month.  He has not yet received Procrit.  GI following with consideration of endoscopy  Hemoglobin stable at 7.3.  No transfusions have been administered.    *Ankylosing spondylitis  On methotrexate which can certainly be contributing to anemia    *Elevated TSH  Now on levothyroxine for hypothyroidism    *Mildly elevated liver labs  Viral hepatitis panel negative  CT imaging pending    *PEG tube in place for nutrition    Elevated IgG level on serum immunofixation but no M spike.  Elevated light chains with a ratio is nearly normal.  Therefore, no evidence for monoclonal gammopathy.    RECOMMENDATIONS/PLAN:   GI following  Daily CBC.  Transfuse as needed to maintain hemoglobin greater than 7.  Stool for occult blood pending  I will  check an erythropoietin level.  Right now I do not think he needs any intravenous iron as iron studies and ferritin are consistent with chronic disease.  We will consider initiating Procrit as an inpatient.  He currently has labs and a Procrit injection scheduled on 8/26 and 9/11 with an appointment with Dr. Quarles on that day.  We will follow    Haider Mckeon MD     Electronically signed by Haider Mckeon MD at 08/18/24 7372       Ed Shannon MD at 08/18/24 8511          Gastroenterology   Initial Inpatient Consult Note    Referring Provider: Luther Miranda    Reason for Consultation: Anemia    Subjective     History of present illness:    79 y.o. male came from a nursing home for low hemoglobin.  Hemoglobin was 6.8    Patient was just seen as a new consult in our practice on 8/8/2024 for chronic anemia with a baseline hemoglobin of around 9 immobility hypertension recurrent UTI severe malnutrition.  Wheelchair-bound difficulty swallowing and has a PEG tube    Recent evaluation has shown moderate oropharyngeal dysphagia    CT scan on 6/18/2024 showed an unremarkable gallbladder liver pancreas and spleen no ductal dilatation gaseous distention of the transverse colon possible ileus    Hemoglobin here 7.5 then 6.9 then 7.1 INR 1.52    Iron level 14 saturation 8% B12 and folate normal    Patient without complaints this morning    Past Medical History:  Past Medical History:   Diagnosis Date    Abscess of scrotum     MARTITA (acute kidney injury)     Altered mental state     Arthritis     AS (ankylosing spondylitis)     History of MRSA infection     Hypertension     Leukocytosis     Tetrahydrocannabinol (THC) use disorder, mild, abuse 12/20/2023    Uveitis      Past Surgical History:  Past Surgical History:   Procedure Laterality Date    COLONOSCOPY      ENDOSCOPY W/ PEG TUBE PLACEMENT N/A 3/29/2024    Procedure: ESOPHAGOGASTRODUODENOSCOPY WITH PERCUTANEOUS ENDOSCOPIC GASTROSTOMY TUBE INSERTION;  Surgeon: Meggan  Khadar Díaz MD;  Location: [facility] ENDOSCOPY;  Service: General;  Laterality: N/A;  PRE/POST - DYSPHAGIA    ROTATOR CUFF REPAIR Right     TOTAL HIP ARTHROPLASTY Left 2014      Social History:   Social History     Tobacco Use    Smoking status: Never    Smokeless tobacco: Never   Substance Use Topics    Alcohol use: No      Family History:  Family History   Problem Relation Age of Onset    Alzheimer's disease Mother     Colon cancer Neg Hx     Colon polyps Neg Hx     Crohn's disease Neg Hx     Irritable bowel syndrome Neg Hx     Ulcerative colitis Neg Hx        Home Meds:  Medications Prior to Admission   Medication Sig Dispense Refill Last Dose    acetaminophen (TYLENOL) 325 MG tablet Take 2 tablets by mouth Every 4 (Four) Hours As Needed for Mild Pain.   8/17/2024    Ascorbic Acid (VITAMIN C PO) Daily.   8/16/2024    atorvastatin (LIPITOR) 40 MG tablet    8/16/2024    bisacodyl (DULCOLAX) 10 MG suppository Insert 1 suppository into the rectum Daily As Needed for Constipation (Use if bisacodyl oral is ineffective).   Past Month    bisacodyl (DULCOLAX) 5 MG EC tablet Take 1 tablet by mouth Daily As Needed for Constipation (Use if polyethylene glycol is ineffective).   Past Week    calcium carbonate (TUMS) 500 MG chewable tablet Chew 2 tablets 2 (Two) Times a Day As Needed for Heartburn.   8/16/2024    cyclobenzaprine (FLEXERIL) 10 MG tablet TAKE 0.5   1 TABLET BY ORAL ROUTE AT BEDTIME AS NEEDED   8/16/2024    Effer-K 20 MEQ effervescent tablet    8/16/2024    HYDROcodone-acetaminophen (NORCO) 5-325 MG per tablet    Past Month    lansoprazole (PREVACID SOLUTAB) 30 MG Tablet Delayed Release Dispersible disintegrating tablet Administer 1 tablet per G tube Every Morning.   8/16/2024    melatonin 3 MG tablet Take 1 tablet by mouth At Night As Needed for Sleep.   8/16/2024    Menthol-Zinc Oxide 0.44-20.6 % ointment Apply 1 Application topically to the appropriate area as directed Every 12 (Twelve) Hours.   8/16/2024     methotrexate 2.5 MG tablet Take 1 tablet by mouth 1 (One) Time Per Week. Take 2.5 mg on Wednesday and 2.5 mg on Thursday.  Do not take any medication on Friday through Tuesday.   8/16/2024    mupirocin (BACTROBAN) 2 % ointment Apply 1 Application topically to the appropriate area as directed Every 12 (Twelve) Hours. Apply to Penis Abrasion for 3 more days   8/16/2024    Omega-3 Fatty Acids (OMEGA 3 PO) Take 1 capsule by mouth Daily.   8/16/2024    potassium chloride (KAYCIEL) 20 mEq/15 mL solution Take 15 mL by mouth Daily.   8/16/2024    sennosides-docusate (PERICOLACE) 8.6-50 MG per tablet Take 2 tablets by mouth 2 (Two) Times a Day As Needed for Constipation.   Past Week    terazosin (HYTRIN) 1 MG capsule Administer 1 capsule per G tube Every Night.   8/16/2024    castor oil-balsam peru (VENELEX) ointment Apply 1 Application topically to the appropriate area as directed Every 12 (Twelve) Hours. Apply to foot, heel, and ankle wounds per wound care order.   Unknown    polyethylene glycol (MIRALAX) 17 g packet Take 17 g by mouth Daily As Needed (Use if senna-docusate is ineffective).   More than a month    VITAMIN D PO Daily.   More than a month     Current Meds:   atorvastatin, 40 mg, Oral, Daily  cefTRIAXone, 2,000 mg, Intravenous, Q24H  ciprofloxacin, 2 drop, Right Eye, Q4H  lansoprazole, 30 mg, Per G Tube, Q AM  [START ON 8/21/2024] methotrexate, 2.5 mg, Oral, Once per day on Wednesday Thursday  potassium chloride, 20 mEq, Oral, Daily  senna-docusate sodium, 2 tablet, Oral, BID  sodium chloride, 10 mL, Intravenous, Q12H  terazosin, 1 mg, Per G Tube, Nightly      Allergies:  No Known Allergies  Review of Systems  Pertinent items are noted in HPI, all other systems reviewed and negative    Objective     Vital Signs  Temp:  [98.2 °F (36.8 °C)-99 °F (37.2 °C)] 98.6 °F (37 °C)  Heart Rate:  [72-81] 81  Resp:  [16-20] 16  BP: (103-124)/(51-67) 124/67    Physical Exam:  CONSTITUTIONAL:  today's vital signs reviewed,  thin  EARS NOSE THROAT: trachea midline and no deformity of the nares  EYES: no scleral icterus  GASTROINTESTINAL: Quite thin with a left-sided G-tube, no pain but tight  PSYCHIATRIC: appropriate mood and affect  RESPIRATORY: normal inspiratory effort with no increased work of breathing  NEUROLOGIC: patient is awake and alert  DERMATOLOGIC: skin is warm with no cyanosis  LYMPHATIC: no periumbilical lymphadenopathy     Results Review:              I reviewed the patient's new clinical results.    Results from last 7 days   Lab Units 08/18/24  0023 08/17/24  1549 08/17/24  0522 08/16/24  2115 08/12/24  0920   WBC 10*3/mm3  --   --  8.55 9.35 11.57*   HEMOGLOBIN g/dL 7.1* 6.9* 7.5* 6.7* 8.9*   HEMATOCRIT % 22.6* 22.4* 23.7* 21.6* 27.4*   PLATELETS 10*3/mm3  --   --  498* 470* 364     Results from last 7 days   Lab Units 08/17/24  0522   SODIUM mmol/L 134*   POTASSIUM mmol/L 4.1   CHLORIDE mmol/L 101   CO2 mmol/L 27.5   BUN mg/dL 12   CREATININE mg/dL 0.54*   CALCIUM mg/dL 8.9   BILIRUBIN mg/dL 0.3   ALK PHOS U/L 85   ALT (SGPT) U/L 61*   AST (SGOT) U/L 62*   GLUCOSE mg/dL 97     Results from last 7 days   Lab Units 08/17/24  0522   INR  1.52*     Lab Results   Lab Value Date/Time    LIPASE 62 (H) 03/25/2024 1723    LIPASE 60 12/20/2023 1029    LIPASE 101 (H) 07/14/2017 1257    LIPASE 86 (H) 06/29/2017 1448       Radiology:  CT Abdomen Pelvis With Contrast    (Results Pending)       Assessment & Plan   Active Hospital Problems    Diagnosis     **Acute on chronic anemia     Elevated liver function tests     Indwelling Mariscal catheter present     Thyroid function test abnormal     Dysphagia     Malnutrition     Feeding by G-tube     Hyperlipidemia     UTI (urinary tract infection)     Enlarged prostate     Essential hypertension     Ankylosing spondylitis        Assessment:  Anemia  protein calorie malnutrition  Iron deficiency  Dysphagia status post G-tube  Malnutrition    Plan:  Nutrition evaluation  Transfuse as  needed  Watch for overt signs of bleeding  Possible iron infusion given conversation nurse practitioner had with family at the time of her consultation in the GI office  Discussion regarding endoscopic procedures with the patient and family      I discussed the patients findings and my recommendations with patient.           Ed Shannon M.D.  [facility] [place] [phone]  Office: [phone]       Electronically signed by Ed Shannon MD at 08/18/24 0909

## 2024-08-21 ENCOUNTER — APPOINTMENT (OUTPATIENT)
Dept: GENERAL RADIOLOGY | Facility: HOSPITAL | Age: 79
DRG: 264 | End: 2024-08-21
Payer: MEDICARE

## 2024-08-21 LAB
ALBUMIN SERPL-MCNC: 2.4 G/DL (ref 3.5–5.2)
ALBUMIN/GLOB SERPL: 0.5 G/DL
ALP SERPL-CCNC: 89 U/L (ref 39–117)
ALT SERPL W P-5'-P-CCNC: 42 U/L (ref 1–41)
ANION GAP SERPL CALCULATED.3IONS-SCNC: 8 MMOL/L (ref 5–15)
AST SERPL-CCNC: 36 U/L (ref 1–40)
BASOPHILS # BLD AUTO: 0.03 10*3/MM3 (ref 0–0.2)
BASOPHILS NFR BLD AUTO: 0.4 % (ref 0–1.5)
BILIRUB SERPL-MCNC: 0.2 MG/DL (ref 0–1.2)
BUN SERPL-MCNC: 17 MG/DL (ref 8–23)
BUN/CREAT SERPL: 37.8 (ref 7–25)
CALCIUM SPEC-SCNC: 8.3 MG/DL (ref 8.6–10.5)
CHLORIDE SERPL-SCNC: 104 MMOL/L (ref 98–107)
CO2 SERPL-SCNC: 25 MMOL/L (ref 22–29)
CREAT SERPL-MCNC: 0.45 MG/DL (ref 0.76–1.27)
CYTO UR: NORMAL
DEPRECATED RDW RBC AUTO: 42.7 FL (ref 37–54)
EGFRCR SERPLBLD CKD-EPI 2021: 106.4 ML/MIN/1.73
EOSINOPHIL # BLD AUTO: 0 10*3/MM3 (ref 0–0.4)
EOSINOPHIL NFR BLD AUTO: 0 % (ref 0.3–6.2)
ERYTHROCYTE [DISTWIDTH] IN BLOOD BY AUTOMATED COUNT: 14.4 % (ref 12.3–15.4)
GLOBULIN UR ELPH-MCNC: 5 GM/DL
GLUCOSE BLDC GLUCOMTR-MCNC: 126 MG/DL (ref 70–130)
GLUCOSE BLDC GLUCOMTR-MCNC: 127 MG/DL (ref 70–130)
GLUCOSE BLDC GLUCOMTR-MCNC: 129 MG/DL (ref 70–130)
GLUCOSE BLDC GLUCOMTR-MCNC: 150 MG/DL (ref 70–130)
GLUCOSE BLDC GLUCOMTR-MCNC: 189 MG/DL (ref 70–130)
GLUCOSE SERPL-MCNC: 150 MG/DL (ref 65–99)
HCT VFR BLD AUTO: 24.1 % (ref 37.5–51)
HGB BLD-MCNC: 7.4 G/DL (ref 13–17.7)
IMM GRANULOCYTES # BLD AUTO: 0.03 10*3/MM3 (ref 0–0.05)
IMM GRANULOCYTES NFR BLD AUTO: 0.4 % (ref 0–0.5)
LAB AP CASE REPORT: NORMAL
LYMPHOCYTES # BLD AUTO: 1.15 10*3/MM3 (ref 0.7–3.1)
LYMPHOCYTES NFR BLD AUTO: 13.9 % (ref 19.6–45.3)
MCH RBC QN AUTO: 25.5 PG (ref 26.6–33)
MCHC RBC AUTO-ENTMCNC: 30.7 G/DL (ref 31.5–35.7)
MCV RBC AUTO: 83.1 FL (ref 79–97)
MONOCYTES # BLD AUTO: 0.64 10*3/MM3 (ref 0.1–0.9)
MONOCYTES NFR BLD AUTO: 7.7 % (ref 5–12)
NEUTROPHILS NFR BLD AUTO: 6.45 10*3/MM3 (ref 1.7–7)
NEUTROPHILS NFR BLD AUTO: 77.6 % (ref 42.7–76)
NRBC BLD AUTO-RTO: 0 /100 WBC (ref 0–0.2)
PATH REPORT.FINAL DX SPEC: NORMAL
PATH REPORT.GROSS SPEC: NORMAL
PLATELET # BLD AUTO: 484 10*3/MM3 (ref 140–450)
PMV BLD AUTO: 8.6 FL (ref 6–12)
POTASSIUM SERPL-SCNC: 4.5 MMOL/L (ref 3.5–5.2)
PROT SERPL-MCNC: 7.4 G/DL (ref 6–8.5)
RBC # BLD AUTO: 2.9 10*6/MM3 (ref 4.14–5.8)
SODIUM SERPL-SCNC: 137 MMOL/L (ref 136–145)
WBC NRBC COR # BLD AUTO: 8.3 10*3/MM3 (ref 3.4–10.8)

## 2024-08-21 PROCEDURE — 25010000002 ERTAPENEM PER 500 MG: Performed by: SURGERY

## 2024-08-21 PROCEDURE — 85025 COMPLETE CBC W/AUTO DIFF WBC: CPT | Performed by: SURGERY

## 2024-08-21 PROCEDURE — 73650 X-RAY EXAM OF HEEL: CPT

## 2024-08-21 PROCEDURE — 99232 SBSQ HOSP IP/OBS MODERATE 35: CPT | Performed by: SURGERY

## 2024-08-21 PROCEDURE — 63710000001 METHOTREXATE 2.5 MG TABLET: Performed by: SURGERY

## 2024-08-21 PROCEDURE — 80053 COMPREHEN METABOLIC PANEL: CPT | Performed by: SURGERY

## 2024-08-21 PROCEDURE — 99232 SBSQ HOSP IP/OBS MODERATE 35: CPT | Performed by: INTERNAL MEDICINE

## 2024-08-21 PROCEDURE — 82948 REAGENT STRIP/BLOOD GLUCOSE: CPT

## 2024-08-21 RX ORDER — BISACODYL 5 MG/1
5 TABLET, DELAYED RELEASE ORAL DAILY PRN
Status: DISCONTINUED | OUTPATIENT
Start: 2024-08-21 | End: 2024-08-24

## 2024-08-21 RX ORDER — BISACODYL 10 MG
10 SUPPOSITORY, RECTAL RECTAL DAILY PRN
Status: DISCONTINUED | OUTPATIENT
Start: 2024-08-21 | End: 2024-08-24

## 2024-08-21 RX ORDER — POLYETHYLENE GLYCOL 3350 17 G/17G
17 POWDER, FOR SOLUTION ORAL DAILY
Status: DISCONTINUED | OUTPATIENT
Start: 2024-08-21 | End: 2024-08-24

## 2024-08-21 RX ORDER — AMOXICILLIN 250 MG
2 CAPSULE ORAL 2 TIMES DAILY
Status: DISCONTINUED | OUTPATIENT
Start: 2024-08-21 | End: 2024-08-24

## 2024-08-21 RX ADMIN — CIPROFLOXACIN 2 DROP: 3 SOLUTION OPHTHALMIC at 09:03

## 2024-08-21 RX ADMIN — SODIUM HYPOCHLORITE: 1.25 SOLUTION TOPICAL at 14:15

## 2024-08-21 RX ADMIN — LANSOPRAZOLE 30 MG: 15 TABLET, ORALLY DISINTEGRATING ORAL at 05:44

## 2024-08-21 RX ADMIN — ERTAPENEM SODIUM 1000 MG: 1 INJECTION, POWDER, LYOPHILIZED, FOR SOLUTION INTRAMUSCULAR; INTRAVENOUS at 13:09

## 2024-08-21 RX ADMIN — CIPROFLOXACIN 2 DROP: 3 SOLUTION OPHTHALMIC at 23:52

## 2024-08-21 RX ADMIN — ATORVASTATIN CALCIUM 40 MG: 20 TABLET, FILM COATED ORAL at 09:03

## 2024-08-21 RX ADMIN — Medication 10 ML: at 20:42

## 2024-08-21 RX ADMIN — Medication 10 ML: at 09:17

## 2024-08-21 RX ADMIN — CIPROFLOXACIN 2 DROP: 3 SOLUTION OPHTHALMIC at 20:42

## 2024-08-21 RX ADMIN — POLYETHYLENE GLYCOL 3350 17 G: 17 POWDER, FOR SOLUTION ORAL at 12:03

## 2024-08-21 RX ADMIN — METHOTREXATE 2.5 MG: 2.5 TABLET ORAL at 09:04

## 2024-08-21 RX ADMIN — CIPROFLOXACIN 2 DROP: 3 SOLUTION OPHTHALMIC at 17:10

## 2024-08-21 RX ADMIN — CIPROFLOXACIN 2 DROP: 3 SOLUTION OPHTHALMIC at 00:11

## 2024-08-21 RX ADMIN — CIPROFLOXACIN 2 DROP: 3 SOLUTION OPHTHALMIC at 12:05

## 2024-08-21 RX ADMIN — SODIUM HYPOCHLORITE: 1.25 SOLUTION TOPICAL at 23:52

## 2024-08-21 RX ADMIN — CIPROFLOXACIN 2 DROP: 3 SOLUTION OPHTHALMIC at 03:58

## 2024-08-21 RX ADMIN — LEVOTHYROXINE SODIUM 25 MCG: 25 TABLET ORAL at 05:44

## 2024-08-21 RX ADMIN — SENNOSIDES AND DOCUSATE SODIUM 2 TABLET: 50; 8.6 TABLET ORAL at 09:04

## 2024-08-21 NOTE — PROGRESS NOTES
Podiatry Progress Note      Patient: Anthony Gallegos Admit Date: 08/17/2024    Age: 79 y.o.   PCP: Keshav Eason MD    MRN: [id number]  Room: [facility]        Subjective     Chief Complaint     Chief Complaint   Patient presents with    Abnormal Lab    Urinary Tract Infection        HPI     Family present at bedside. Denies any acute changes over night. No pain currently to the left heel. Wound vac without leak overnight per nursing.    Past Medical History     Past Medical History:   Diagnosis Date    Abscess of scrotum     MARTITA (acute kidney injury)     Altered mental state     Arthritis     AS (ankylosing spondylitis)     History of MRSA infection     Hypertension     Leukocytosis     Tetrahydrocannabinol (THC) use disorder, mild, abuse 12/20/2023    Uveitis         Past Surgical History:   Procedure Laterality Date    COLONOSCOPY      ENDOSCOPY W/ PEG TUBE PLACEMENT N/A 3/29/2024    Procedure: ESOPHAGOGASTRODUODENOSCOPY WITH PERCUTANEOUS ENDOSCOPIC GASTROSTOMY TUBE INSERTION;  Surgeon: Khadar Broderick MD;  Location: [facility] ENDOSCOPY;  Service: General;  Laterality: N/A;  PRE/POST - DYSPHAGIA    ROTATOR CUFF REPAIR Right     TOTAL HIP ARTHROPLASTY Left 2014        No Known Allergies     Social History     Tobacco Use   Smoking Status Never   Smokeless Tobacco Never        Objective   Physical Exam    Vitals:    08/21/24 0746   BP: 101/54   Pulse: 75   Resp: 16   Temp: 98.4 °F (36.9 °C)   SpO2: 99%        Dermatology: Wound vac in place to the left heel. No streaking erythema past dressing site.    Vascular: cap refill 3-5 seconds all digits    Neurological: sensation grossly intact light touch    Musckuloskeletal:Lightly flexes and extends ankle    Labs     Lab Results   Component Value Date    HGBA1C 5.80 (H) 01/23/2024    POCGLU 129 08/21/2024    SEDRATE 57 (H) 12/20/2023        CBC:      Lab 08/21/24  0543 08/20/24  0537 08/19/24  0802 08/18/24  1545 08/18/24  0759 08/17/24  1549 08/17/24  0522    WBC 8.30 7.80 9.66  --  8.79  --  8.55   HEMOGLOBIN 7.4* 7.1* 7.2*   < > 7.3*  7.3*   < > 7.5*   HEMATOCRIT 24.1* 23.1* 23.5*   < > 23.7*  23.7*   < > 23.7*   PLATELETS 484* 437 468*  --  519*  --  498*   NEUTROS ABS 6.45 5.32 7.18*  --  6.41  --  5.39   IMMATURE GRANS (ABS) 0.03 0.04 0.05  --  0.04  --  0.04   LYMPHS ABS 1.15 1.18 1.16  --  1.19  --  1.72   MONOS ABS 0.64 0.88 1.03*  --  0.91*  --  1.01*   EOS ABS 0.00 0.34 0.18  --  0.18  --  0.33   MCV 83.1 84.6 84.2  --  83.2  --  83.5    < > = values in this interval not displayed.          Results for orders placed or performed during the hospital encounter of 08/17/24   Blood Culture - Blood, Arm, Right    Specimen: Arm, Right; Blood   Result Value Ref Range    Blood Culture No growth at 3 days         XR Calcaneus 2+ View Left  Narrative: XR CALCANEUS 2+ VW LEFT-8/21/2024     HISTORY: Left heel ulcer.     Left posterior heel soft tissue ulcer is seen with small amount of soft  tissue air. No definite erosive changes of the adjacent calcaneus is  seen. No fractures are seen.     Impression: 1. Soft tissue ulceration posterior to the calcaneus with no definite  bony changes seen.  2. Further evaluation with MRI of the foot could be obtained if  clinically indicated.        This report was finalized on 8/21/2024 12:06 PM by Dr. Dario Dumont M.D on Workstation: [id number]          Assessment/Plan     79M post op day 1 left heel debridement/I&D  -Cultures +poly microbial pending finalization. Continue to monitor.  -xray reviewed no clear bony changes. Culture of periosteum and bone is positive. 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 Do not believe MRI is required given the OR findings, depth of ulcer, and culture present. Believe palliative care of the osteomyelitis likely present would be the best option for this patient.   -Wound vac with good seal. Pending change 8/23.  -Discussed with family at length the severity of this wound and difficulty  healing this given his health. At minimum we were able to reduce bioburden to the heel and hopeful to at least get some soft tissue coverage over the bone with the wound vac  -Bed boot at all times, I reapplied this today.  -Will follow, please call with any questions      Renny Duval DPM  [facility]  Office: [phone]

## 2024-08-21 NOTE — PROGRESS NOTES
" LOS: 1 day     Name: Anthony Gallegos  Age: 79 y.o.  Sex: male  :  1945  MRN: [id number]         Primary Care Physician: Keshav Eason MD    Subjective   Subjective  He has no complaints at present.  He underwent sacral/ischial ulcer debridement along with heel debridement yesterday.  Last bowel movement on .  No acute overnight events    Objective   Vital Signs  Temp:  [97.5 °F (36.4 °C)-98.4 °F (36.9 °C)] 98.4 °F (36.9 °C)  Heart Rate:  [61-82] 75  Resp:  [12-16] 16  BP: ()/(49-82) 101/54  Body mass index is 20.34 kg/m².    Objective:  General Appearance:  Comfortable, in no acute distress and ill-appearing (Chronically ill, weak, frail, deconditioned appearing).    Vital signs: (most recent): Blood pressure 101/54, pulse 75, temperature 98.4 °F (36.9 °C), temperature source Oral, resp. rate 16, height 190.5 cm (75\"), weight 73.8 kg (162 lb 11.2 oz), SpO2 99%.    Lungs:  Normal effort and normal respiratory rate.    Heart: Normal rate.  Regular rhythm.    Abdomen: Abdomen is soft.  Bowel sounds are normal.   There is no abdominal tenderness.     Extremities: There is no dependent edema or local swelling.    Neurological: Patient is alert and oriented to person, place and time.    Skin:  Warm and dry.                Results Review:       I reviewed the patient's new clinical results.    Results from last 7 days   Lab Units 24  0543 24  0537 24  0802 24  0059 24  1545 24  0759 24  0023 24  1549 24  0522 24  2115   WBC 10*3/mm3 8.30 7.80 9.66  --   --  8.79  --   --  8.55 9.35   HEMOGLOBIN g/dL 7.4* 7.1* 7.2* 7.5* 7.0* 7.3*  7.3* 7.1*   < > 7.5* 6.7*   PLATELETS 10*3/mm3 484* 437 468*  --   --  519*  --   --  498* 470*    < > = values in this interval not displayed.     Results from last 7 days   Lab Units 24  0543 24  0537 24  0802 24  0759 24  0522   SODIUM mmol/L 137 136 132* 132* 134*   POTASSIUM mmol/L " 4.5 3.8 3.7 3.7 4.1   CHLORIDE mmol/L 104 105 102 99 101   CO2 mmol/L 25.0 27.0 25.8 26.0 27.5   BUN mg/dL 17 12 14 14 12   CREATININE mg/dL 0.45* 0.35* 0.43* 0.55* 0.54*   CALCIUM mg/dL 8.3* 8.3* 8.5* 8.7 8.9   GLUCOSE mg/dL 150* 93 123* 94 97     Results from last 7 days   Lab Units 08/17/24  0522   INR  1.52*             Scheduled Meds:   atorvastatin, 40 mg, Oral, Daily  ciprofloxacin, 2 drop, Right Eye, Q4H  collagenase, 1 Application, Topical, BID  ertapenem, 1,000 mg, Intravenous, Q24H  lansoprazole, 30 mg, Per G Tube, Q AM  levothyroxine, 25 mcg, Oral, Q AM  methotrexate, 2.5 mg, Oral, Once per day on Wednesday Thursday  potassium chloride, 20 mEq, Oral, Daily  senna-docusate sodium, 2 tablet, Oral, BID  sodium chloride, 10 mL, Intravenous, Q12H  sodium hypochlorite, , Topical, BID  terazosin, 1 mg, Per G Tube, Nightly      PRN Meds:     acetaminophen **OR** acetaminophen **OR** acetaminophen    senna-docusate sodium **AND** polyethylene glycol **AND** bisacodyl **AND** bisacodyl    Calcium Replacement - Follow Nurse / BPA Driven Protocol    cyclobenzaprine    HYDROcodone-acetaminophen    Magnesium Standard Dose Replacement - Follow Nurse / BPA Driven Protocol    melatonin    nitroglycerin    ondansetron ODT **OR** ondansetron    Phosphorus Replacement - Follow Nurse / BPA Driven Protocol    Potassium Replacement - Follow Nurse / BPA Driven Protocol    [COMPLETED] Insert Peripheral IV **AND** sodium chloride    sodium chloride    sodium chloride  Continuous Infusions:  lactated ringers, 9 mL/hr, Last Rate: 9 mL/hr (08/20/24 1148)        Assessment & Plan   Active Hospital Problems    Diagnosis  POA    **Acute on chronic anemia [D64.9]  Yes    Hypothyroidism (acquired) [E03.9]  Yes    Elevated liver function tests [R79.89]  Yes    Indwelling Mariscal catheter present [Z97.8]  Not Applicable    Thyroid function test abnormal [R94.6]  Yes    Dysphagia [R13.10]  Yes    Malnutrition [E46]  Yes    Feeding by G-tube  [Z93.1]  Not Applicable    Hyperlipidemia [E78.5]  Yes    Sacral wound [S31.000A]  Unknown    UTI (urinary tract infection) [N39.0]  Yes    Enlarged prostate [N40.0]  Yes    Essential hypertension [I10]  Yes    Ankylosing spondylitis [M45.9]  Yes      Resolved Hospital Problems   No resolved problems to display.       Assessment & Plan    Acute on chronic iron deficiency anemia/anemia of chronic disease.  Anemia workup suggestive of chronic disease.  Currently on a schedule of Procrit injections.  GI has signed off.  Elevated liver function test.  Resolving.  Hepatitis panel is negative.  Okay to continue on statin for now.  Hypertension.  Good control on no medications.  No evidence of angina or congestive heart failure.  Will monitor.  Right infectious conjunctivitis.  On Cipro eyedrops.  Improving.  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  Urine culture has grown ESBL E. coli and he has been switched to Invanz.  Blood cultures negative.  CT abdomen pelvis showed no complicating urinary features.  Constipation.  Continue senna-S and add MiraLAX daily.  Last bowel movement was on 8/19.  CT that day did suggest proctitis/stool impaction and we will monitor bowel movements moving forward.  Ankylosing spondylitis.  Continue methotrexate.  Hypothyroidism.  Newly diagnosed.  Initiated low-dose Synthroid and monitor as an outpatient with repeat TFTs in 4 to 6 weeks.   Dysphagia/GERD/malnutrition.  Speech recommends puréed and honey thickened liquids.  Nutrition G-tube feeds.  Continue proton pump inhibitors.  Dyslipidemia.  Continue Lipitor.   Bilateral lower extremity wounds/sacral and ischial wounds.  He underwent debridement of the sacral/ischial wounds as well as heel wound in the operating room yesterday.  Appreciate assistance from general surgery and podiatry.  VTE prophylaxis.  Sequential compression device.      Expected  Discharge Date: 8/22/2024; Expected Discharge Time:      Sharif Logan MD  [facility]  08/21/24  09:46 EDT

## 2024-08-21 NOTE — PLAN OF CARE
Goal Outcome Evaluation:  Plan of Care Reviewed With: patient        Progress: improving  Outcome Evaluation: Pt Wound care completed as directed, Left heel with wound vac on place functioning well, repositioned q2hrs and prn, kept clean and dry,tube feeding tolerated well no s/s of acute distress, had medium bm this shift.

## 2024-08-21 NOTE — PLAN OF CARE
Goal Outcome Evaluation:  Plan of Care Reviewed With: patient        Progress: improving   VSS. Pt turned q2h. TFs via G-tube. Pain pill given x 1. FC care done. Dressings to bilateral scapulas, R trochanter, and R heel done. Left sacral dressing alone-waiting for orders from surgery for dressing changes. Wound vac to R heel-drsg CDI. Pt refused boot for foot-heels floated on pillow. DC TBD.

## 2024-08-21 NOTE — PROGRESS NOTES
Discharge Planning Assessment  [facility]     Patient Name: Anthony Gallegos  MRN: [id number]  Today's Date: 8/21/2024    Admit Date: 8/17/2024    Plan: Return to [facility] care; may need wound vac at discharge   Discharge Needs Assessment    No documentation.                  Discharge Plan       Row Name 08/21/24 1559       Plan    Plan Return to [facility] long term care; may need wound vac at discharge    Plan Comments Call placed to [facility] and informed her patient may need a wound vac at discharge for her left heel and may need another wound vac for her sacral wound. Tavares MOORE                  Continued Care and Services - Admitted Since 8/17/2024       Destination       Service Provider Request Status Selected Services Address Phone Fax Patient Preferred    [facility] Accepted N/A [address] [id number] [phone] --                  Selected Continued Care - Prior Encounters Includes continued care and service providers with selected services from prior encounters from 5/19/2024 to 8/21/2024      Discharged on 6/27/2024 Admission date: 6/17/2024 - Discharge disposition: Intermediate Care       Destination       Service Provider Selected Services Address Phone Fax Patient Preferred    [facility] Skilled Nursing [address] [place] [id number] [phone] --                          Expected Discharge Date and Time       Expected Discharge Date Expected Discharge Time    Aug 22, 2024            Demographic Summary    No documentation.                  Functional Status    No documentation.                  Psychosocial    No documentation.                  Abuse/Neglect    No documentation.                  Legal    No documentation.                  Substance Abuse    No documentation.                  Patient Forms    No documentation.                     Nancy Goel,  RN

## 2024-08-21 NOTE — PROGRESS NOTES
"General Surgery Progress Note    POD# 1 status post debridement of sacral and ischial pressure wounds    S: Patient is doing well.  Has not been having significant amount of pain.  Dressing was changed by his nurse today, who reported healthy appearing tissue without foul smell or signs of bleeding.    O:/49 (BP Location: Left arm, Patient Position: Sitting)   Pulse 73   Temp 98.4 °F (36.9 °C) (Oral)   Resp 16   Ht 190.5 cm (75\")   Wt 73.8 kg (162 lb 11.2 oz)   SpO2 100%   BMI 20.34 kg/m²     Intake & Output (last day)         08/20 0701  08/21 0700 08/21 0701 08/22 0700    P.O. 30     I.V. (mL/kg) 500 (6.8)     Other 227 90    NG/ 545    Total Intake(mL/kg) 1423 (19.3) 635 (8.6)    Urine (mL/kg/hr) 900 (0.5) 175 (0.2)    Stool  0    Total Output 900 175    Net +523 +460          Stool Unmeasured Occurrence  1 x            GENERAL: Cachectic, ill-appearing  HEENT: normochephalic, atraumatic, moist mucus membranes, clear sclera   CHEST: clear to auscultation, no wheezes, no increased work of breathing  CARDIAC: regular rate and rhythm    EXTREMITIES: no cyanosis, clubbing or edema    SKIN: Warm and moist, no rashes    LABS REVIEWED:   Results from last 7 days   Lab Units 08/21/24  0543 08/20/24  0537 08/19/24  0802   WBC 10*3/mm3 8.30 7.80 9.66   HEMOGLOBIN g/dL 7.4* 7.1* 7.2*   HEMATOCRIT % 24.1* 23.1* 23.5*   PLATELETS 10*3/mm3 484* 437 468*     Results from last 7 days   Lab Units 08/21/24  0543 08/20/24  0537 08/19/24  0802   SODIUM mmol/L 137 136 132*   POTASSIUM mmol/L 4.5 3.8 3.7   CHLORIDE mmol/L 104 105 102   CO2 mmol/L 25.0 27.0 25.8   BUN mg/dL 17 12 14   CREATININE mg/dL 0.45* 0.35* 0.43*   CALCIUM mg/dL 8.3* 8.3* 8.5*   BILIRUBIN mg/dL 0.2 0.2 <0.2   ALK PHOS U/L 89 86 90   ALT (SGPT) U/L 42* 50* 50*   AST (SGOT) U/L 36 50* 49*   GLUCOSE mg/dL 150* 93 123*     Results from last 7 days   Lab Units 08/17/24  0522   INR  1.52*   APTT seconds 36.1*           A/P: 79 y.o. male with " immobility and malnutrition with multiple pressure wounds status post debridement.  -I will ask wound care to evaluate for appropriateness for wound VAC placement tomorrow.  Continue Dakin's soaked Kerlix dressing changes until then.      MATTY BARAJAS MD  General, Robotic and Endoscopic Surgery  [facility]    [address], [address]  [place], [place], [address]  P: [phone]  F: [phone]

## 2024-08-21 NOTE — PROGRESS NOTES
Nutrition Services    Patient Name:  Anthony Gallegos  YOB: 1945  MRN: [id number]  Admit Date:  8/17/2024    Nutrition Services    Patient Name: Anthony Gallegos  YOB: 1945  MRN: [id number]  Admission date: 8/17/2024    PROGRESS NOTE      Encounter Information: Checking in on TF tolerance/po intake. Surgical debridement of sacral/ischial/heel wounds completed yesterday. Pt talking on the phone at visit.       PO Diet: Diet: Regular/House; Texture: Pureed (NDD 1); Fluid Consistency: Honey Thick   PO Supplements: Erich BID   PO Intake:  Minimal       Current nutrition support: Nutren 2.0 at 45 mL/hr. Free water flush of 30 mL q 4 hrs.   Nutrition support review: Tolerating TF at goal per EMR. MD to manage free water flushes.       Labs (reviewed below): Glu 150/150/189, Cr 0.45, Platelets 484       GI Function:  Fecal incontinence, last BM 8/19       Nutrition Intervention Updates: Plan/Recommendations  Good po intake encouraged and will monitor  Continue Magic Cups BID (L/D) to support po intake  Gtube feeds of Nutren 2.0 at goal of 45ml/hr  Water flushes [id number]--EW to manage  Erich 1 pkg bolus BID via gtube for wound healing  If po intake improves, consider nocturnal feeds    RD to follow     Results from last 7 days   Lab Units 08/21/24  0543 08/20/24  0537 08/19/24  0802   SODIUM mmol/L 137 136 132*   POTASSIUM mmol/L 4.5 3.8 3.7   CHLORIDE mmol/L 104 105 102   CO2 mmol/L 25.0 27.0 25.8   BUN mg/dL 17 12 14   CREATININE mg/dL 0.45* 0.35* 0.43*   CALCIUM mg/dL 8.3* 8.3* 8.5*   BILIRUBIN mg/dL 0.2 0.2 <0.2   ALK PHOS U/L 89 86 90   ALT (SGPT) U/L 42* 50* 50*   AST (SGOT) U/L 36 50* 49*   GLUCOSE mg/dL 150* 93 123*     Results from last 7 days   Lab Units 08/21/24  0543   HEMOGLOBIN g/dL 7.4*   HEMATOCRIT % 24.1*     COVID19   Date Value Ref Range Status   03/25/2024 Not Detected Not Detected - Ref. Range Final     Lab Results   Component Value Date    HGBA1C 5.80 (H) 01/23/2024       RD  to follow up per protocol.    Electronically signed by:  Mindy Rowe  08/21/24 08:28 EDT

## 2024-08-21 NOTE — PROGRESS NOTES
REASON FOR FOLLOWUP/CHIEF COMPLAINT: anemia   Anemia  HISTORY OF PRESENT ILLNESS:   No new problems.  No bleeding.    However, sacral and ischial pressure wounds are going to be surgically debrided today.    Past Medical History, Past Surgical History, Social History, Family History have been reviewed and are without significant changes except as mentioned.    Review of Systems   Review of Systems   Constitutional:  Negative for activity change.   HENT:  Negative for nosebleeds and trouble swallowing.    Respiratory:  Negative for shortness of breath and wheezing.    Cardiovascular:  Negative for chest pain and palpitations.   Gastrointestinal:  Negative for constipation, diarrhea and nausea.   Genitourinary:  Negative for dysuria and hematuria.   Musculoskeletal:  Negative for arthralgias and myalgias.   Neurological:  Negative for seizures and syncope.   Hematological:  Negative for adenopathy. Does not bruise/bleed easily.   Psychiatric/Behavioral:  Negative for confusion.        Medications:  The current medication list was reviewed in the EMR    ALLERGIES:  No Known Allergies           Vitals:    08/20/24 1924 08/20/24 2330 08/21/24 0518 08/21/24 0746   BP: 107/57 90/75  101/54   BP Location: Left arm Left arm  Left arm   Patient Position: Lying Lying  Sitting   Pulse: 82 73  75   Resp: 16 16  16   Temp: 98.2 °F (36.8 °C) 97.5 °F (36.4 °C)  98.4 °F (36.9 °C)   TempSrc: Oral Oral  Oral   SpO2: 99% 99%  99%   Weight:   73.8 kg (162 lb 11.2 oz)    Height:         Physical Exam    CONSTITUTIONAL:  Vital signs reviewed.  No distress, looks comfortable.  EYES:  Conjunctivae and lids unremarkable.  PERRLA  EARS, NOSE, MOUTH, THROAT:  Ears and nose appear unremarkable.  Lips, teeth, gums appear unremarkable.  RESPIRATORY:  Normal respiratory effort.  Lungs clear to auscultation bilaterally.  CARDIOVASCULAR:  Normal S1, S2.  No murmurs, rubs or gallops.  No significant lower extremity edema.  GASTROINTESTINAL:  Abdomen appears unremarkable.  Nontender.  No hepatomegaly.  No splenomegaly.  NEURO: Cranial nerves 2-12 grossly intact.  No focal deficits.  Appears to have equal strength all 4 extremities.  MUSCULOSKELETAL:  Unremarkable digits/nails.  No cyanosis or clubbing.  SKIN:  Warm.  No rashes.  PSYCHIATRIC:  Normal judgment and insight.  Normal mood and affect.        RECENT LABS:  WBC   Date Value Ref Range Status   08/21/2024 8.30 3.40 - 10.80 10*3/mm3 Final   08/20/2024 7.80 3.40 - 10.80 10*3/mm3 Final   08/19/2024 9.66 3.40 - 10.80 10*3/mm3 Final     Hemoglobin   Date Value Ref Range Status   08/21/2024 7.4 (L) 13.0 - 17.7 g/dL Final   08/20/2024 7.1 (L) 13.0 - 17.7 g/dL Final   08/19/2024 7.2 (L) 13.0 - 17.7 g/dL Final   08/19/2024 7.5 (L) 13.0 - 17.7 g/dL Final   08/18/2024 7.0 (L) 13.0 - 17.7 g/dL Final     Platelets   Date Value Ref Range Status   08/21/2024 484 (H) 140 - 450 10*3/mm3 Final   08/20/2024 437 140 - 450 10*3/mm3 Final   08/19/2024 468 (H) 140 - 450 10*3/mm3 Final       ASSESSMENT/PLAN:  Anthony Gallegos S606/1     *Normocytic anemia (reason for admission)  Transferred from his nursing home for a hemoglobin of 6.7  Ferritin 869, iron 14, vitamin B12 914, folic acid greater than 20, haptoglobin 361  He was seen by Dr. Quarles for initial consultation in the office on 8/12/2024.  Ferritin was high, iron low.  His plan was to initiate Procrit every couple of weeks and he has a follow-up appointment scheduled next month.  He has not yet received Procrit.  GI following with consideration of endoscopy  Ferritin 869, 8% saturation.  With the elevated ferritin, IV iron was not given.  Seen by Dr. Quarles on 8/12/2024.  He planned Procrit 20,000 units every 2 weeks for anemia of chronic disease and arranged appointments in the office.  8/19/2024: Procrit 20,000 units for anemia of chronic disease as per Dr. Quarles, his outpatient hematologist  Hb 7.4, from 7.1, from 7.2, from 7.3     *Ankylosing spondylitis  On  methotrexate which can certainly be contributing to anemia     *Elevated TSH  Now on levothyroxine for hypothyroidism      *PEG tube in place for nutrition     Elevated IgG level on serum immunofixation but no M spike.  Elevated light chains with a ratio is nearly normal.  Therefore, no evidence for monoclonal gammopathy.    *forde asso uti in the setting of history of urine outflow obstruction requiring chronic forde. On antibiotics through hospitalist service.    *Sacral and ischial pressure wounds.  Surgical debridement occurred 8/20/2024.     RECOMMENDATIONS/PLAN:   GI following  Daily CBC.  Transfuse as needed to maintain hemoglobin greater than 7.  He currently has labs and a Procrit injection scheduled on 8/26 and 9/11 with an appointment with Dr. Quarles on that day.    Reviewed hospitalist note and general surgery note

## 2024-08-22 LAB
ABO GROUP BLD: NORMAL
ALBUMIN SERPL-MCNC: 2.2 G/DL (ref 3.5–5.2)
ALBUMIN/GLOB SERPL: 0.4 G/DL
ALP SERPL-CCNC: 85 U/L (ref 39–117)
ALT SERPL W P-5'-P-CCNC: 52 U/L (ref 1–41)
ANION GAP SERPL CALCULATED.3IONS-SCNC: 7.4 MMOL/L (ref 5–15)
AST SERPL-CCNC: 52 U/L (ref 1–40)
BACTERIA ISLT: NORMAL
BACTERIA SPEC AEROBE CULT: NORMAL
BACTERIA SPEC AEROBE CULT: NORMAL
BASOPHILS # BLD AUTO: 0.03 10*3/MM3 (ref 0–0.2)
BASOPHILS NFR BLD AUTO: 0.4 % (ref 0–1.5)
BILIRUB SERPL-MCNC: <0.2 MG/DL (ref 0–1.2)
BLD GP AB SCN SERPL QL: NEGATIVE
BUN SERPL-MCNC: 15 MG/DL (ref 8–23)
BUN/CREAT SERPL: 32.6 (ref 7–25)
CALCIUM SPEC-SCNC: 8.2 MG/DL (ref 8.6–10.5)
CHLORIDE SERPL-SCNC: 107 MMOL/L (ref 98–107)
CO2 SERPL-SCNC: 26.6 MMOL/L (ref 22–29)
CREAT SERPL-MCNC: 0.46 MG/DL (ref 0.76–1.27)
DEPRECATED RDW RBC AUTO: 42.6 FL (ref 37–54)
EGFRCR SERPLBLD CKD-EPI 2021: 105.7 ML/MIN/1.73
EOSINOPHIL # BLD AUTO: 0.25 10*3/MM3 (ref 0–0.4)
EOSINOPHIL NFR BLD AUTO: 3.6 % (ref 0.3–6.2)
ERYTHROCYTE [DISTWIDTH] IN BLOOD BY AUTOMATED COUNT: 14.4 % (ref 12.3–15.4)
GLOBULIN UR ELPH-MCNC: 5 GM/DL
GLUCOSE BLDC GLUCOMTR-MCNC: 100 MG/DL (ref 70–130)
GLUCOSE BLDC GLUCOMTR-MCNC: 110 MG/DL (ref 70–130)
GLUCOSE BLDC GLUCOMTR-MCNC: 112 MG/DL (ref 70–130)
GLUCOSE BLDC GLUCOMTR-MCNC: 114 MG/DL (ref 70–130)
GLUCOSE SERPL-MCNC: 100 MG/DL (ref 65–99)
HCT VFR BLD AUTO: 21.2 % (ref 37.5–51)
HGB BLD-MCNC: 6.5 G/DL (ref 13–17.7)
IMM GRANULOCYTES # BLD AUTO: 0.03 10*3/MM3 (ref 0–0.05)
IMM GRANULOCYTES NFR BLD AUTO: 0.4 % (ref 0–0.5)
LYMPHOCYTES # BLD AUTO: 1.79 10*3/MM3 (ref 0.7–3.1)
LYMPHOCYTES NFR BLD AUTO: 25.7 % (ref 19.6–45.3)
MCH RBC QN AUTO: 25.4 PG (ref 26.6–33)
MCHC RBC AUTO-ENTMCNC: 30.7 G/DL (ref 31.5–35.7)
MCV RBC AUTO: 82.8 FL (ref 79–97)
MONOCYTES # BLD AUTO: 0.8 10*3/MM3 (ref 0.1–0.9)
MONOCYTES NFR BLD AUTO: 11.5 % (ref 5–12)
NEUTROPHILS NFR BLD AUTO: 4.06 10*3/MM3 (ref 1.7–7)
NEUTROPHILS NFR BLD AUTO: 58.4 % (ref 42.7–76)
NRBC BLD AUTO-RTO: 0 /100 WBC (ref 0–0.2)
PLATELET # BLD AUTO: 438 10*3/MM3 (ref 140–450)
PMV BLD AUTO: 8.5 FL (ref 6–12)
POTASSIUM SERPL-SCNC: 3.9 MMOL/L (ref 3.5–5.2)
PROT SERPL-MCNC: 7.2 G/DL (ref 6–8.5)
RBC # BLD AUTO: 2.56 10*6/MM3 (ref 4.14–5.8)
RH BLD: POSITIVE
SODIUM SERPL-SCNC: 141 MMOL/L (ref 136–145)
T&S EXPIRATION DATE: NORMAL
WBC NRBC COR # BLD AUTO: 6.96 10*3/MM3 (ref 3.4–10.8)

## 2024-08-22 PROCEDURE — 63710000001 METHOTREXATE 2.5 MG TABLET: Performed by: SURGERY

## 2024-08-22 PROCEDURE — 86850 RBC ANTIBODY SCREEN: CPT | Performed by: INTERNAL MEDICINE

## 2024-08-22 PROCEDURE — 36430 TRANSFUSION BLD/BLD COMPNT: CPT

## 2024-08-22 PROCEDURE — 86900 BLOOD TYPING SEROLOGIC ABO: CPT | Performed by: INTERNAL MEDICINE

## 2024-08-22 PROCEDURE — 25010000002 ERTAPENEM PER 500 MG: Performed by: SURGERY

## 2024-08-22 PROCEDURE — 86900 BLOOD TYPING SEROLOGIC ABO: CPT

## 2024-08-22 PROCEDURE — 80053 COMPREHEN METABOLIC PANEL: CPT | Performed by: SURGERY

## 2024-08-22 PROCEDURE — 99232 SBSQ HOSP IP/OBS MODERATE 35: CPT | Performed by: INTERNAL MEDICINE

## 2024-08-22 PROCEDURE — 25010000002 FUROSEMIDE PER 20 MG: Performed by: INTERNAL MEDICINE

## 2024-08-22 PROCEDURE — 82948 REAGENT STRIP/BLOOD GLUCOSE: CPT

## 2024-08-22 PROCEDURE — 93005 ELECTROCARDIOGRAM TRACING: CPT

## 2024-08-22 PROCEDURE — 85025 COMPLETE CBC W/AUTO DIFF WBC: CPT | Performed by: SURGERY

## 2024-08-22 PROCEDURE — 86923 COMPATIBILITY TEST ELECTRIC: CPT

## 2024-08-22 PROCEDURE — 84100 ASSAY OF PHOSPHORUS: CPT | Performed by: HOSPITALIST

## 2024-08-22 PROCEDURE — 86901 BLOOD TYPING SEROLOGIC RH(D): CPT | Performed by: INTERNAL MEDICINE

## 2024-08-22 PROCEDURE — 93010 ELECTROCARDIOGRAM REPORT: CPT | Performed by: INTERNAL MEDICINE

## 2024-08-22 PROCEDURE — 99232 SBSQ HOSP IP/OBS MODERATE 35: CPT | Performed by: SURGERY

## 2024-08-22 PROCEDURE — 99223 1ST HOSP IP/OBS HIGH 75: CPT | Performed by: STUDENT IN AN ORGANIZED HEALTH CARE EDUCATION/TRAINING PROGRAM

## 2024-08-22 PROCEDURE — 83735 ASSAY OF MAGNESIUM: CPT

## 2024-08-22 PROCEDURE — P9016 RBC LEUKOCYTES REDUCED: HCPCS

## 2024-08-22 RX ORDER — ACETAMINOPHEN 650 MG/1
650 SUPPOSITORY RECTAL ONCE
Status: COMPLETED | OUTPATIENT
Start: 2024-08-22 | End: 2024-08-22

## 2024-08-22 RX ORDER — FUROSEMIDE 10 MG/ML
20 INJECTION INTRAMUSCULAR; INTRAVENOUS ONCE
Status: COMPLETED | OUTPATIENT
Start: 2024-08-22 | End: 2024-08-22

## 2024-08-22 RX ORDER — ACETAMINOPHEN 325 MG/1
650 TABLET ORAL ONCE
Status: COMPLETED | OUTPATIENT
Start: 2024-08-22 | End: 2024-08-22

## 2024-08-22 RX ORDER — FAMOTIDINE 10 MG/ML
20 INJECTION, SOLUTION INTRAVENOUS ONCE
Status: COMPLETED | OUTPATIENT
Start: 2024-08-22 | End: 2024-08-22

## 2024-08-22 RX ORDER — ACETAMINOPHEN 160 MG/5ML
650 SOLUTION ORAL ONCE
Status: COMPLETED | OUTPATIENT
Start: 2024-08-22 | End: 2024-08-22

## 2024-08-22 RX ADMIN — SENNOSIDES AND DOCUSATE SODIUM 2 TABLET: 50; 8.6 TABLET ORAL at 09:10

## 2024-08-22 RX ADMIN — HYDROCODONE BITARTRATE AND ACETAMINOPHEN 1 TABLET: 5; 325 TABLET ORAL at 14:37

## 2024-08-22 RX ADMIN — ATORVASTATIN CALCIUM 40 MG: 20 TABLET, FILM COATED ORAL at 09:10

## 2024-08-22 RX ADMIN — SENNOSIDES AND DOCUSATE SODIUM 2 TABLET: 50; 8.6 TABLET ORAL at 21:25

## 2024-08-22 RX ADMIN — LANSOPRAZOLE 30 MG: 15 TABLET, ORALLY DISINTEGRATING ORAL at 05:54

## 2024-08-22 RX ADMIN — COLLAGENASE SANTYL 1 APPLICATION: 250 OINTMENT TOPICAL at 09:11

## 2024-08-22 RX ADMIN — Medication 10 ML: at 09:18

## 2024-08-22 RX ADMIN — METHOTREXATE 2.5 MG: 2.5 TABLET ORAL at 09:10

## 2024-08-22 RX ADMIN — CIPROFLOXACIN 2 DROP: 3 SOLUTION OPHTHALMIC at 11:25

## 2024-08-22 RX ADMIN — SODIUM HYPOCHLORITE: 1.25 SOLUTION TOPICAL at 09:11

## 2024-08-22 RX ADMIN — CIPROFLOXACIN 2 DROP: 3 SOLUTION OPHTHALMIC at 16:24

## 2024-08-22 RX ADMIN — CIPROFLOXACIN 2 DROP: 3 SOLUTION OPHTHALMIC at 09:11

## 2024-08-22 RX ADMIN — LEVOTHYROXINE SODIUM 25 MCG: 25 TABLET ORAL at 05:54

## 2024-08-22 RX ADMIN — CIPROFLOXACIN 2 DROP: 3 SOLUTION OPHTHALMIC at 23:55

## 2024-08-22 RX ADMIN — ACETAMINOPHEN 650 MG: 325 TABLET ORAL at 11:24

## 2024-08-22 RX ADMIN — POLYETHYLENE GLYCOL 3350 17 G: 17 POWDER, FOR SOLUTION ORAL at 09:10

## 2024-08-22 RX ADMIN — Medication 10 ML: at 21:26

## 2024-08-22 RX ADMIN — FUROSEMIDE 20 MG: 10 INJECTION, SOLUTION INTRAMUSCULAR; INTRAVENOUS at 16:23

## 2024-08-22 RX ADMIN — CIPROFLOXACIN 2 DROP: 3 SOLUTION OPHTHALMIC at 21:26

## 2024-08-22 RX ADMIN — CIPROFLOXACIN 2 DROP: 3 SOLUTION OPHTHALMIC at 04:09

## 2024-08-22 RX ADMIN — ERTAPENEM SODIUM 1000 MG: 1 INJECTION, POWDER, LYOPHILIZED, FOR SOLUTION INTRAMUSCULAR; INTRAVENOUS at 10:44

## 2024-08-22 RX ADMIN — FAMOTIDINE 20 MG: 10 INJECTION INTRAVENOUS at 11:24

## 2024-08-22 NOTE — PROGRESS NOTES
"General Surgery Progress Note    POD#2 s/p debridement of sacral and ischial pressure wounds    S: Patient denies significant pain.  No other complaints.    O:/52   Pulse 72   Temp 97.9 °F (36.6 °C) (Oral)   Resp 16   Ht 190.5 cm (75\")   Wt 69.9 kg (154 lb 1.6 oz) Comment: all weights removed from bed  SpO2 100%   BMI 19.26 kg/m²     Intake & Output (last day)         08/21 0701 08/22 0700 08/22 0701 08/23 0700    P.O. 60     I.V. (mL/kg)      Other 275 20    NG/GT 1020     Total Intake(mL/kg) 1355 (19.4) 20 (0.3)    Urine (mL/kg/hr) 950 (0.6)     Stool 0     Total Output 950     Net +405 +20          Stool Unmeasured Occurrence 1 x             GENERAL: awake, alert, no apparent distress.  Chronically ill-appearing and cachectic  HEENT: normochephalic, atraumatic, moist mucus membranes, clear sclera   CHEST: clear to auscultation, no wheezes, no increased work of breathing  CARDIAC: regular rate and rhythm    EXTREMITIES: no cyanosis, clubbing or edema    SKIN: Warm and moist, no rashes    LABS REVIEWED:   Results from last 7 days   Lab Units 08/22/24  0611 08/21/24  0543 08/20/24  0537   WBC 10*3/mm3 6.96 8.30 7.80   HEMOGLOBIN g/dL 6.5* 7.4* 7.1*   HEMATOCRIT % 21.2* 24.1* 23.1*   PLATELETS 10*3/mm3 438 484* 437     Results from last 7 days   Lab Units 08/22/24  0611 08/21/24  0543 08/20/24  0537   SODIUM mmol/L 141 137 136   POTASSIUM mmol/L 3.9 4.5 3.8   CHLORIDE mmol/L 107 104 105   CO2 mmol/L 26.6 25.0 27.0   BUN mg/dL 15 17 12   CREATININE mg/dL 0.46* 0.45* 0.35*   CALCIUM mg/dL 8.2* 8.3* 8.3*   BILIRUBIN mg/dL <0.2 0.2 0.2   ALK PHOS U/L 85 89 86   ALT (SGPT) U/L 52* 42* 50*   AST (SGOT) U/L 52* 36 50*   GLUCOSE mg/dL 100* 150* 93     Results from last 7 days   Lab Units 08/17/24  0522   INR  1.52*   APTT seconds 36.1*           A/P: 79 y.o. male with mobility and multiple pressure wounds status post debridement.   -Okay for wound VAC placement by wound ostomy  -Antibiotics per ID " recgenaro BARAJAS MD  General, Robotic and Endoscopic Surgery  [facility]    [address], [address]  [place], [place], [address]  P: [phone]  F: [phone]

## 2024-08-22 NOTE — NURSING NOTE
CWOCN- consult from Dr Roque to place assess for VAC placement for coccyx and left ischial wounds. Assessed patient- will place VAC dressings today. Placed per flow sheet data- patient tolerated well. Heel dressing to be changed Friday. Then both dressings can be on M-W-F schedule.   Patient is on a specialty mattress and is tolerating turns. Heels off loaded with boot, pillow.   Reviewed plan with RN and patient.        08/22/24 1600   Wound 08/17/24 Right distal leg Pressure Injury   Placement Date: 08/17/24   Side: Right  Orientation: distal  Location: leg  Primary Wound Type: Pressure Injury   Dressing Appearance intact   Base moist;pink   Periwound intact   Drainage Characteristics/Odor serosanguineous   Drainage Amount scant   Care, Wound cleansed with;sterile normal saline   Dressing Care dressing changed;border dressing;hydrofiber;silver impregnated;silicone   Wound 08/17/24 Right posterior greater trochanter Pressure Injury   Placement Date: 08/17/24   Side: Right  Orientation: posterior  Location: greater trochanter  Primary Wound Type: Pressure Injury   Dressing Appearance intact   Base moist;pink   Periwound intact   Drainage Characteristics/Odor serosanguineous   Drainage Amount scant   Care, Wound cleansed with;sterile normal saline   Dressing Care dressing changed;border dressing;hydrofiber;silicone;silver impregnated   Periwound Care dry periwound area maintained   Wound 08/20/24 1259 coccyx Incision   Placement Date/Time: 08/20/24 1259   Location: coccyx  Primary Wound Type: Incision   Pressure Injury Stage 4   Dressing Appearance moist drainage   Base moist;red   Periwound ecchymotic;pink   Periwound Temperature warm   Periwound Skin Turgor soft   Edges open   Wound Length (cm) 8 cm   Wound Width (cm) 7 cm   Wound Depth (cm) 2 cm   Wound Surface Area (cm^2) 56 cm^2   Wound Volume (cm^3) 112 cm^3   Undermining [Depth (cm)/Location] 1-4oclock, 2.5cm   Drainage Characteristics/Odor serosanguineous    Drainage Amount scant   Care, Wound cleansed with;sterile normal saline;negative pressure wound therapy   Dressing Care dressing changed   Periwound Care barrier film applied   Wound 08/20/24 1259 Left gluteal Incision   Placement Date/Time: 08/20/24 1259   Side: Left  Location: gluteal  Primary Wound Type: Incision   Pressure Injury Stage U   Dressing Appearance intact   Base moist;red;yellow   Red (%), Wound Tissue Color 50   Yellow (%), Wound Tissue Color 50   Periwound edematous;pink   Periwound Temperature warm   Periwound Skin Turgor soft   Edges open   Wound Length (cm) 5 cm   Wound Width (cm) 5 cm   Wound Depth (cm) 1.2 cm   Wound Surface Area (cm^2) 25 cm^2   Wound Volume (cm^3) 30 cm^3   Undermining [Depth (cm)/Location] 3-4oclock, 1cm   Drainage Characteristics/Odor serosanguineous   Drainage Amount scant   Care, Wound cleansed with;sterile normal saline;negative pressure wound therapy   Dressing Care dressing changed;foam  (hydrofera blue)   Periwound Care barrier film applied   Wound 08/20/24 1900 Left scapula   Placement Date/Time: 08/20/24 1900   Present on Original Admission: Yes  Side: Left  Location: scapula   Pressure Injury Stage U   Dressing Appearance moist drainage   Base black eschar;moist;red   Periwound pink   Periwound Temperature warm   Periwound Skin Turgor soft   Edges open   Drainage Characteristics/Odor serosanguineous   Drainage Amount scant   Care, Wound cleansed with;sterile normal saline   Dressing Care dressing changed;border dressing;hydrofiber;silver impregnated   Wound 06/18/24 1345 Right scapula Pressure Injury   Placement Date/Time: 06/18/24 1345   Present on Original Admission: Yes  Side: Right  Location: scapula  Primary Wound Type: Pressure Injury   Dressing Appearance intact   Base moist;pink   Periwound pink   Periwound Temperature warm   Periwound Skin Turgor soft   Edges open   Drainage Characteristics/Odor serous   Drainage Amount scant   Care, Wound cleansed  with;sterile normal saline   Dressing Care dressing changed;border dressing;hydrofiber;silver impregnated   NPWT (Negative Pressure Wound Therapy) 08/22/24 1618 coccyx, left ischium   Placement Date/Time: 08/22/24 1618   Location: coccyx, left ischium   Therapy Setting continuous therapy   Dressing foam, black;transparent dressing   Pressure Setting 125 mmHg   Sponges Inserted   (1 piece black to coccyx, 1 piece black + 1 piece hydrofera blue to left ischium)   Finger sweep complete Yes

## 2024-08-22 NOTE — CONSULTS
"Referring Provider: Tom Noel MD  Reason for Consultation: left calcaneal osteomyelitis and ESBL UTI   Chief Complaint   Patient presents with    Abnormal Lab    Urinary Tract Infection         Subjective   History of present illness: Patient is an 79-year-old male with past medical history of ankylosing spondylitis, BPH requiring Mariscal catheter, malnutrition requiring G-tube and iron deficiency anemia who presents with anemia.  ID consulted for \"left calcaneal osteomyelitis and ESBL UTI\".    On presentation patient was noted to have sacral decubitus ulcer and left heel ulcer status post debridement by general surgery and podiatry respectively.  Cultures so far growing gram-negative bacilli.  Course complicated by ESBL UTI and currently is on ertapenem.    Patient reports today he is feeling much better.  Currently eating and tolerating this well.  Denies any difficulties with tolerating his current blood transfusion or recent antibiotics.    Past Medical History:   Diagnosis Date    Abscess of scrotum     MARTITA (acute kidney injury)     Altered mental state     Arthritis     AS (ankylosing spondylitis)     History of MRSA infection     Hypertension     Leukocytosis     Tetrahydrocannabinol (THC) use disorder, mild, abuse 12/20/2023    Uveitis        Past Surgical History:   Procedure Laterality Date    COLONOSCOPY      ENDOSCOPY W/ PEG TUBE PLACEMENT N/A 3/29/2024    Procedure: ESOPHAGOGASTRODUODENOSCOPY WITH PERCUTANEOUS ENDOSCOPIC GASTROSTOMY TUBE INSERTION;  Surgeon: Khadar Broderick MD;  Location: [facility] ENDOSCOPY;  Service: General;  Laterality: N/A;  PRE/POST - DYSPHAGIA    ROTATOR CUFF REPAIR Right     TOTAL HIP ARTHROPLASTY Left 2014    WOUND DEBRIDEMENT N/A 8/20/2024    Procedure: DEBRIDEMENT SACRAL ULCER/WOUND;  Surgeon: Justine Roque MD;  Location: [facility] MAIN OR;  Service: General;  Laterality: N/A;    WOUND DEBRIDEMENT Left 8/20/2024    Procedure: LEFT DEBRIDEMENT FOOT;  Surgeon: Mukund" "Renny HOWARD DPM;  Location: [facility] OR;  Service: Podiatry;  Laterality: Left;       family history includes Alzheimer's disease in his mother.     reports that he has never smoked. He has never used smokeless tobacco. He reports that he does not drink alcohol and does not use drugs.     No Known Allergies    Medication:  Antibiotics:  Anti-Infectives (From admission, onward)      Ordered     Dose/Rate Route Frequency Start Stop    08/19/24 1007  ertapenem (INVanz) 1,000 mg in sodium chloride 0.9 % 100 mL MBP        Ordering Provider: Justine Roque MD    1,000 mg  200 mL/hr over 30 Minutes Intravenous Every 24 Hours 08/19/24 1100 08/26/24 1059              Objective     Physical Exam:   Vital Signs   Temp:  [97.9 °F (36.6 °C)-98.4 °F (36.9 °C)] 97.9 °F (36.6 °C)  Heart Rate:  [65-73] 72  Resp:  [16-18] 16  BP: ()/(49-57) 106/52    GENERAL: Awake and alert, in no acute distress. Chronically ill-appearing.  HEENT: Oropharynx is clear. Hearing is grossly normal.    EYES: PERRL. No conjunctival injection. No lid lag.   LUNGS: Normal work of breathing  GI: G-tube in place  SKIN: Left heel wound with dressing in place  PSYCHIATRIC: Appropriate mood, affect, insight, and judgment.     Results Review:   I reviewed the patient's new clinical results.  I reviewed the patient's new imaging results and agree with the interpretation.  I reviewed the patient's other test results and agree with the interpretation    Lab Results   Component Value Date    WBC 6.96 08/22/2024    HGB 6.5 (C) 08/22/2024    HCT 21.2 (L) 08/22/2024    MCV 82.8 08/22/2024     08/22/2024       No results found for: \"VANCOPEAK\", \"VANCOTROUGH\", \"VANCORANDOM\"    Lab Results   Component Value Date    GLUCOSE 100 (H) 08/22/2024    BUN 15 08/22/2024    CREATININE 0.46 (L) 08/22/2024    EGFRIFNONA 69 03/27/2018    EGFRIFAFRI 84 03/27/2018    BCR 32.6 (H) 08/22/2024    CO2 26.6 08/22/2024    CALCIUM 8.2 (L) 08/22/2024    PROTENTOTREF 8.2 " 08/12/2024    ALBUMIN 2.2 (L) 08/22/2024    LABIL2 0.4 (L) 08/12/2024    AST 52 (H) 08/22/2024    ALT 52 (H) 08/22/2024         Estimated Creatinine Clearance: 126.6 mL/min (A) (by C-G formula based on SCr of 0.46 mg/dL (L)).      Microbiology:  8/17 urine culture ESBL E. coli  8/17 blood cultures no growth  8/20 sacral wound culture gram-negative bacilli  8/20 left heel culture gram-negative bacilli    Radiology:  8/21 left foot x-ray with soft tissue ulceration of the heel.    8/19 CT abdomen pelvis report reviewed with possible proctitis/rectal stool impaction.  Nonobstructive uropathy.    Assessment     #Left heel osteomyelitis  #Sacral decubitus wound  #BPH with chronic Mariscal catheter  #ESBL E. coli UTI  #Ankylosing spondylitis  #Malnutrition and dysphagia status post G-tube    Status post debridement of sacral decubitus ulcer and left heel decubitus ulcer.  At this point would not recommend long-term antibiotics and agree with palliative measures per podiatry.  Antibiotics are unlikely to be curative of osteomyelitis without proper coverage.    Operative cultures are pending and will plan to treat for skin and soft tissue course based on culture results.  Continue ertapenem 1 g daily for now.      Thank you for this consult.  We will continue to follow along and tailor antibiotics as the patient's clinical course evolves.

## 2024-08-22 NOTE — PROGRESS NOTES
Nutrition Services    Patient Name:  Anthony Gallegos  YOB: 1945  MRN: [id number]  Admit Date:  8/17/2024    Nutrition Services    Patient Name: Anthony Gallegos  YOB: 1945  MRN: [id number]  Admission date: 8/17/2024    PROGRESS NOTE      Encounter Information: Checking in on TF tolerance/po intake. Wound VAC in place to L heel.       PO Diet: Diet: Regular/House; Texture: Pureed (NDD 1); Fluid Consistency: Honey Thick   PO Supplements: Erich BID  Magic Cup BID   PO Intake:  Minimal per EMR       Current nutrition support: Nutren 2.0 at 45 mL/hr. Free water flush of 30 mL q 4 hrs.   Nutrition support review: Tolerating TF at goal per EMR. MD to manage free water flushes.       Labs (reviewed below): Glu 127/114/100, Cr 0.46       GI Function:  Fecal incontinence, last BM 8/19       Nutrition Intervention Updates: Plan/Recommendations  Good po intake encouraged and will monitor  Continue Magic Cups BID (L/D) to support po intake  Gtube feeds of Nutren 2.0 at goal of 45ml/hr  Water flushes [id number] to manage  Erich 1 pkg bolus BID via gtube for wound healing  If po intake improves, consider nocturnal feeds    RD to follow     Results from last 7 days   Lab Units 08/22/24  0611 08/21/24  0543 08/20/24  0537   SODIUM mmol/L 141 137 136   POTASSIUM mmol/L 3.9 4.5 3.8   CHLORIDE mmol/L 107 104 105   CO2 mmol/L 26.6 25.0 27.0   BUN mg/dL 15 17 12   CREATININE mg/dL 0.46* 0.45* 0.35*   CALCIUM mg/dL 8.2* 8.3* 8.3*   BILIRUBIN mg/dL <0.2 0.2 0.2   ALK PHOS U/L 85 89 86   ALT (SGPT) U/L 52* 42* 50*   AST (SGOT) U/L 52* 36 50*   GLUCOSE mg/dL 100* 150* 93     Results from last 7 days   Lab Units 08/22/24  0611   HEMOGLOBIN g/dL 6.5*   HEMATOCRIT % 21.2*     COVID19   Date Value Ref Range Status   03/25/2024 Not Detected Not Detected - Ref. Range Final     Lab Results   Component Value Date    HGBA1C 5.80 (H) 01/23/2024       RD to follow up per protocol.    Electronically signed by:  Mindy  Blades  08/22/24 09:15 EDT

## 2024-08-22 NOTE — PROGRESS NOTES
REASON FOR FOLLOWUP/CHIEF COMPLAINT: anemia   Anemia  HISTORY OF PRESENT ILLNESS:   No new issues.  No bleeding problems.    Past Medical History, Past Surgical History, Social History, Family History have been reviewed and are without significant changes except as mentioned.    Review of Systems   Review of Systems   Constitutional:  Negative for activity change.   HENT:  Negative for nosebleeds and trouble swallowing.    Respiratory:  Negative for shortness of breath and wheezing.    Cardiovascular:  Negative for chest pain and palpitations.   Gastrointestinal:  Negative for constipation, diarrhea and nausea.   Genitourinary:  Negative for dysuria and hematuria.   Musculoskeletal:  Negative for arthralgias and myalgias.   Neurological:  Negative for seizures and syncope.   Hematological:  Negative for adenopathy. Does not bruise/bleed easily.   Psychiatric/Behavioral:  Negative for confusion.        Medications:  The current medication list was reviewed in the EMR    ALLERGIES:  No Known Allergies           Vitals:    08/21/24 1927 08/21/24 2329 08/22/24 0624 08/22/24 0744   BP: 97/57 102/49  97/49   BP Location: Left arm Left arm  Left arm   Patient Position: Lying Lying  Lying   Pulse: 70 73  71   Resp: 16 16  16   Temp: 98.1 °F (36.7 °C) 98.1 °F (36.7 °C)  98.1 °F (36.7 °C)   TempSrc: Oral Oral  Oral   SpO2:    100%   Weight:   69.9 kg (154 lb 1.6 oz)  Comment: all weights removed from bed    Height:         Physical Exam    CONSTITUTIONAL:  Vital signs reviewed.  No distress, looks comfortable.  EYES:  Conjunctivae and lids unremarkable.  PERRLA  EARS, NOSE, MOUTH, THROAT:  Ears and nose appear unremarkable.  Lips, teeth, gums appear unremarkable.  RESPIRATORY:  Normal respiratory effort.  Lungs clear to auscultation bilaterally.  CARDIOVASCULAR:  Normal S1, S2.  No murmurs, rubs or gallops.  No significant lower extremity edema.  GASTROINTESTINAL: Abdomen appears unremarkable.  Nontender.  No  hepatomegaly.  No splenomegaly.  NEURO: Cranial nerves 2-12 grossly intact.  No focal deficits.  Appears to have equal strength all 4 extremities.  MUSCULOSKELETAL:  Unremarkable digits/nails.  No cyanosis or clubbing.  SKIN:  Warm.  No rashes.  PSYCHIATRIC:  Normal judgment and insight.  Normal mood and affect.        RECENT LABS:  WBC   Date Value Ref Range Status   08/22/2024 6.96 3.40 - 10.80 10*3/mm3 Final   08/21/2024 8.30 3.40 - 10.80 10*3/mm3 Final   08/20/2024 7.80 3.40 - 10.80 10*3/mm3 Final     Hemoglobin   Date Value Ref Range Status   08/22/2024 6.5 (C) 13.0 - 17.7 g/dL Final   08/21/2024 7.4 (L) 13.0 - 17.7 g/dL Final   08/20/2024 7.1 (L) 13.0 - 17.7 g/dL Final     Platelets   Date Value Ref Range Status   08/22/2024 438 140 - 450 10*3/mm3 Final   08/21/2024 484 (H) 140 - 450 10*3/mm3 Final   08/20/2024 437 140 - 450 10*3/mm3 Final       ASSESSMENT/PLAN:  Anthony Gallegos S606/1     *Normocytic anemia (reason for admission)  Transferred from his nursing home for a hemoglobin of 6.7  Ferritin 869, iron 14, vitamin B12 914, folic acid greater than 20, haptoglobin 361  He was seen by Dr. Quarles for initial consultation in the office on 8/12/2024.  Ferritin was high, iron low.  His plan was to initiate Procrit every couple of weeks and he has a follow-up appointment scheduled next month.  He has not yet received Procrit.  GI following with consideration of endoscopy  Ferritin 869, 8% saturation.  With the elevated ferritin, IV iron was not given.  Seen by Dr. Quarles on 8/12/2024.  He planned Procrit 20,000 units every 2 weeks for anemia of chronic disease and arranged appointments in the office.  8/19/2024: Procrit 20,000 units for anemia of chronic disease as per Dr. Quarles, his outpatient hematologist  Hb 6.5 on 8/22/2024 (transfusing 2 units), from 7.4, from 7.1, from 7.2, from 7.3     *Ankylosing spondylitis  On methotrexate which can certainly be contributing to anemia     *Elevated TSH  Now on  "levothyroxine for hypothyroidism      *PEG tube in place for nutrition     Elevated IgG level on serum immunofixation but no M spike.  Elevated light chains with a ratio is nearly normal.  Therefore, no evidence for monoclonal gammopathy.    *forde asso uti in the setting of history of urine outflow obstruction requiring chronic forde. On antibiotics through hospitalist service.    *Sacral and ischial pressure wounds.  Surgical debridement occurred 8/20/2024.    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 Duval, podiatry stated \"likely osteomyelitis of the calcaneus.  Do not believe MRI is required given the OR findings, depth of ulcer, and culture present.  Believe palliative care of the osteomyelitis likely presents would be the best option for this patient.\"     RECOMMENDATIONS/PLAN:   Daily CBC.  Transfuse as needed to maintain hemoglobin greater than 7.  He currently has labs and a Procrit injection scheduled on 8/26 and 9/11 with an appointment with Dr. Quarles on that day.  Noted podiatry states likely osteomyelitis of calcaneus and they recommend palliative care of the osteomyelitis    Reviewed general surgery note and podiatry note noting likely osteomyelitis of calcaneus  "

## 2024-08-22 NOTE — PLAN OF CARE
Goal Outcome Evaluation:  Plan of Care Reviewed With: patient        Progress: improving   VSS. No c/o pain. Turned q2h. Dressing change to buttocks done at [facility]. Wound vac to L heel. WOCN to see today to evaluate patient for wound vac on buttocks. TF-Nutren 2.0. FC care done. Accuchek q6h. RACHEL SANCHEZ.

## 2024-08-22 NOTE — PROGRESS NOTES
Name: Anthony Gallegos ADMIT: 2024   : 1945  PCP: Keshav Eason MD    MRN: [id number] LOS: 2 days   AGE/SEX: 79 y.o. male  ROOM: [facility]     Subjective   Subjective   Feeling poorly today. Denies any N/V/D/abd pain. Tolerating po--just drinking liquids as he does not like the pureed diet. No F/C/NS. No SOA or CP. Making urine. Moving bowels. No eye issues today.        Objective   Objective   Vital Signs  Temp:  [97.9 °F (36.6 °C)-98.4 °F (36.9 °C)] 97.9 °F (36.6 °C)  Heart Rate:  [65-73] 72  Resp:  [16-18] 16  BP: ()/(49-57) 106/52  SpO2:  [100 %] 100 %  on   ;   Device (Oxygen Therapy): room air  Body mass index is 19.26 kg/m².  Physical Exam  Vitals and nursing note reviewed.   Constitutional:       General: He is not in acute distress.     Appearance: He is ill-appearing (chronically). He is not toxic-appearing or diaphoretic.   HENT:      Head: Normocephalic.      Nose: Nose normal.      Mouth/Throat:      Mouth: Mucous membranes are moist.      Pharynx: Oropharynx is clear.      Comments: Voice hoarse  Eyes:      General: No scleral icterus.        Right eye: No discharge.         Left eye: No discharge.      Extraocular Movements: Extraocular movements intact.      Conjunctiva/sclera: Conjunctivae normal.   Cardiovascular:      Rate and Rhythm: Normal rate and regular rhythm.      Pulses: Normal pulses.   Pulmonary:      Effort: Pulmonary effort is normal. No respiratory distress.      Breath sounds: Normal breath sounds. No wheezing or rales.      Comments: Anteriorly   Abdominal:      General: Bowel sounds are normal. There is no distension.      Palpations: Abdomen is soft.      Tenderness: There is no abdominal tenderness.      Comments: PEG site looks good   Genitourinary:     Comments: Clear yellow urine in bag  Musculoskeletal:         General: No swelling.      Cervical back: Neck supple.      Comments: LLE in heel boot, dressing to left foot   Skin:     General: Skin is warm  and dry.      Capillary Refill: Capillary refill takes 2 to 3 seconds.      Coloration: Skin is not jaundiced.   Neurological:      General: No focal deficit present.      Mental Status: He is alert and oriented to person, place, and time. Mental status is at baseline.      Cranial Nerves: No cranial nerve deficit.      Coordination: Coordination normal.   Psychiatric:         Mood and Affect: Mood normal.         Behavior: Behavior normal.         Thought Content: Thought content normal.       Results Review     I reviewed the patient's new clinical results.  Results from last 7 days   Lab Units 08/22/24  0611 08/21/24  0543 08/20/24  0537 08/19/24  0802   WBC 10*3/mm3 6.96 8.30 7.80 9.66   HEMOGLOBIN g/dL 6.5* 7.4* 7.1* 7.2*   PLATELETS 10*3/mm3 438 484* 437 468*     Results from last 7 days   Lab Units 08/22/24  0611 08/21/24  0543 08/20/24  0537 08/19/24  0802   SODIUM mmol/L 141 137 136 132*   POTASSIUM mmol/L 3.9 4.5 3.8 3.7   CHLORIDE mmol/L 107 104 105 102   CO2 mmol/L 26.6 25.0 27.0 25.8   BUN mg/dL 15 17 12 14   CREATININE mg/dL 0.46* 0.45* 0.35* 0.43*   GLUCOSE mg/dL 100* 150* 93 123*   EGFR mL/min/1.73 105.7 106.4 114.8 107.9     Results from last 7 days   Lab Units 08/22/24  0611 08/21/24 0543 08/20/24  0537 08/19/24  0802   ALBUMIN g/dL 2.2* 2.4* 2.4* 2.3*   BILIRUBIN mg/dL <0.2 0.2 0.2 <0.2   ALK PHOS U/L 85 89 86 90   AST (SGOT) U/L 52* 36 50* 49*   ALT (SGPT) U/L 52* 42* 50* 50*     Results from last 7 days   Lab Units 08/22/24  0611 08/21/24  0543 08/20/24  0537 08/19/24  0802   CALCIUM mg/dL 8.2* 8.3* 8.3* 8.5*   ALBUMIN g/dL 2.2* 2.4* 2.4* 2.3*       Glucose   Date/Time Value Ref Range Status   08/22/2024 1204 110 70 - 130 mg/dL Final   08/22/2024 0608 114 70 - 130 mg/dL Final   08/21/2024 2353 127 70 - 130 mg/dL Final   08/21/2024 1701 126 70 - 130 mg/dL Final   08/21/2024 1213 129 70 - 130 mg/dL Final   08/21/2024 0629 189 (H) 70 - 130 mg/dL Final   08/21/2024 0011 150 (H) 70 - 130 mg/dL Final        XR Calcaneus 2+ View Left    Result Date: 8/21/2024  1. Soft tissue ulceration posterior to the calcaneus with no definite bony changes seen. 2. Further evaluation with MRI of the foot could be obtained if clinically indicated.   This report was finalized on 8/21/2024 12:06 PM by Dr. Dario Dumont M.D on Workstation: [id number]       I have personally reviewed all medications:  Scheduled Medications  atorvastatin, 40 mg, Oral, Daily  ciprofloxacin, 2 drop, Right Eye, Q4H  collagenase, 1 Application, Topical, BID  ertapenem, 1,000 mg, Intravenous, Q24H  furosemide, 20 mg, Intravenous, Once  lansoprazole, 30 mg, Per G Tube, Q AM  levothyroxine, 25 mcg, Oral, Q AM  methotrexate, 2.5 mg, Oral, Once per day on Wednesday Thursday  senna-docusate sodium, 2 tablet, Oral, BID   And  polyethylene glycol, 17 g, Oral, Daily  [Held by provider] potassium chloride, 20 mEq, Oral, Daily  sodium chloride, 10 mL, Intravenous, Q12H  sodium hypochlorite, , Topical, BID  terazosin, 1 mg, Per G Tube, Nightly    Infusions  lactated ringers, 9 mL/hr, Last Rate: 9 mL/hr (08/20/24 1148)    Diet  Diet: Regular/House; Texture: Pureed (NDD 1); Fluid Consistency: Honey Thick    I have personally reviewed:  [x]  Laboratory   [x]  Microbiology   [x]  Radiology   [x]  EKG/Telemetry  []  Cardiology/Vascular   []  Pathology    [x]  Records       Assessment/Plan     Active Hospital Problems    Diagnosis  POA    **Acute on chronic anemia [D64.9]  Yes    Hypothyroidism (acquired) [E03.9]  Yes    Elevated liver function tests [R79.89]  Yes    Indwelling Mariscal catheter present [Z97.8]  Not Applicable    Thyroid function test abnormal [R94.6]  Yes    Dysphagia [R13.10]  Yes    Malnutrition [E46]  Yes    Feeding by G-tube [Z93.1]  Not Applicable    Hyperlipidemia [E78.5]  Yes    Sacral wound [S31.000A]  Unknown    UTI (urinary tract infection) [N39.0]  Yes    Enlarged prostate [N40.0]  Yes    Essential hypertension [I10]  Yes    Ankylosing  spondylitis [M45.9]  Yes      Resolved Hospital Problems   No resolved problems to display.       78yo gentleman with ankylosing spondylitis, HTN, BPH, chronic forde, GERD, HLD, TR[place]ON, and O/P dysphagia chronically on tube feeds, who was sent to ER from facility for Hgb of 6.7.    Acute on chronic iron deficiency anemia/Anemia of chronic disease.  Anemia workup suggestive of chronic disease. Currently on a schedule of Procrit injections. GI has signed off as stool heme neg and family not interested in pursuing endoscopy. Heme/Onc following. Hgb down to 6.5 this AM. 2 units PRBCs ordered.  Elevated liver function test.  Numbers stable. Viral hepatitis panel is negative. Okay to continue statin for now.  Hypertension.  Good control on no medications. No evidence of angina or congestive heart failure. Will monitor.  Right infectious conjunctivitis.  On Cipro gtts for 7d.  Improving.  ESBL E.coli UTI in a patient with chronic indwelling Forde catheter.  Abx changed to Invanz. Blood cultures negative so far. CT A/P showed no complicating urinary features.  Constipation.  Continue Senna-S BID and MiraLAX daily. Moving bowels now. CT 8/19 did suggest proctitis/stool impaction and we will monitor bowel movements closely moving forward.  Ankylosing spondylitis.  Continue methotrexate.  Hypothyroidism.  Newly diagnosed. Initiated low-dose Synthroid, monitor as an outpatient with repeat TFTs in 4 to 6 weeks.   Dysphagia/GERD/malnutrition.  SLP recommends puréed and honey thickened liquids. Continue G-tube feeds for nutritional support. Continue PPI.  Dyslipidemia.  Continue Lipitor.   Bilateral lower extremity wounds/sacral and ischial wounds/left calcaneal osteomyelitis.  Underwent debridement of the sacral/ischial wounds and left heel wound in the operating room on 8/20 by Dr. Roque and Dr. Duval respectively. Continue wound care per Gen Surg recs. Pod recommending palliative tx of what appears to be calcaneal osteomyelitis.  Will ask ID to weigh in regarding possible chronic therapy. Continue Invanz for now.      SCDs for DVT prophylaxis.  Full code.  Discussed with patient and RN. No family present.  Anticipate discharge  back to SNF  timing yet to be determined.  Expected discharge date/ time has not been documented.      Walter Mancia MD  [facility]  08/22/24  13:16 EDT

## 2024-08-23 LAB
ALBUMIN SERPL-MCNC: 2.3 G/DL (ref 3.5–5.2)
ALBUMIN/GLOB SERPL: 0.4 G/DL
ALP SERPL-CCNC: 103 U/L (ref 39–117)
ALT SERPL W P-5'-P-CCNC: 67 U/L (ref 1–41)
ANION GAP SERPL CALCULATED.3IONS-SCNC: 5.8 MMOL/L (ref 5–15)
AST SERPL-CCNC: 68 U/L (ref 1–40)
BASOPHILS # BLD AUTO: 0.08 10*3/MM3 (ref 0–0.2)
BASOPHILS NFR BLD AUTO: 1 % (ref 0–1.5)
BH BB BLOOD EXPIRATION DATE: NORMAL
BH BB BLOOD EXPIRATION DATE: NORMAL
BH BB BLOOD TYPE BARCODE: 5100
BH BB BLOOD TYPE BARCODE: 5100
BH BB DISPENSE STATUS: NORMAL
BH BB DISPENSE STATUS: NORMAL
BH BB PRODUCT CODE: NORMAL
BH BB PRODUCT CODE: NORMAL
BH BB UNIT NUMBER: NORMAL
BH BB UNIT NUMBER: NORMAL
BILIRUB SERPL-MCNC: 0.2 MG/DL (ref 0–1.2)
BUN SERPL-MCNC: 18 MG/DL (ref 8–23)
BUN/CREAT SERPL: 46.2 (ref 7–25)
CALCIUM SPEC-SCNC: 8.4 MG/DL (ref 8.6–10.5)
CHLORIDE SERPL-SCNC: 105 MMOL/L (ref 98–107)
CO2 SERPL-SCNC: 26.2 MMOL/L (ref 22–29)
CREAT SERPL-MCNC: 0.39 MG/DL (ref 0.76–1.27)
CROSSMATCH INTERPRETATION: NORMAL
CROSSMATCH INTERPRETATION: NORMAL
DEPRECATED RDW RBC AUTO: 46.3 FL (ref 37–54)
EGFRCR SERPLBLD CKD-EPI 2021: 111.1 ML/MIN/1.73
EOSINOPHIL # BLD AUTO: 0.41 10*3/MM3 (ref 0–0.4)
EOSINOPHIL NFR BLD AUTO: 5.1 % (ref 0.3–6.2)
ERYTHROCYTE [DISTWIDTH] IN BLOOD BY AUTOMATED COUNT: 14.5 % (ref 12.3–15.4)
GLOBULIN UR ELPH-MCNC: 5.3 GM/DL
GLUCOSE BLDC GLUCOMTR-MCNC: 106 MG/DL (ref 70–130)
GLUCOSE BLDC GLUCOMTR-MCNC: 119 MG/DL (ref 70–130)
GLUCOSE BLDC GLUCOMTR-MCNC: 122 MG/DL (ref 70–130)
GLUCOSE SERPL-MCNC: 100 MG/DL (ref 65–99)
HCT VFR BLD AUTO: 30.3 % (ref 37.5–51)
HGB BLD-MCNC: 9.4 G/DL (ref 13–17.7)
IMM GRANULOCYTES # BLD AUTO: 0.02 10*3/MM3 (ref 0–0.05)
IMM GRANULOCYTES NFR BLD AUTO: 0.3 % (ref 0–0.5)
LYMPHOCYTES # BLD AUTO: 1.75 10*3/MM3 (ref 0.7–3.1)
LYMPHOCYTES NFR BLD AUTO: 22 % (ref 19.6–45.3)
MAGNESIUM SERPL-MCNC: 2 MG/DL (ref 1.6–2.4)
MCH RBC QN AUTO: 26.9 PG (ref 26.6–33)
MCHC RBC AUTO-ENTMCNC: 31 G/DL (ref 31.5–35.7)
MCV RBC AUTO: 86.8 FL (ref 79–97)
MONOCYTES # BLD AUTO: 0.91 10*3/MM3 (ref 0.1–0.9)
MONOCYTES NFR BLD AUTO: 11.4 % (ref 5–12)
NEUTROPHILS NFR BLD AUTO: 4.8 10*3/MM3 (ref 1.7–7)
NEUTROPHILS NFR BLD AUTO: 60.2 % (ref 42.7–76)
NRBC BLD AUTO-RTO: 0 /100 WBC (ref 0–0.2)
PHOSPHATE SERPL-MCNC: 2.4 MG/DL (ref 2.5–4.5)
PLATELET # BLD AUTO: 436 10*3/MM3 (ref 140–450)
PMV BLD AUTO: 8.7 FL (ref 6–12)
POTASSIUM SERPL-SCNC: 3.8 MMOL/L (ref 3.5–5.2)
PROT SERPL-MCNC: 7.6 G/DL (ref 6–8.5)
QT INTERVAL: 392 MS
QTC INTERVAL: 430 MS
RBC # BLD AUTO: 3.49 10*6/MM3 (ref 4.14–5.8)
SODIUM SERPL-SCNC: 137 MMOL/L (ref 136–145)
UNIT  ABO: NORMAL
UNIT  ABO: NORMAL
UNIT  RH: NORMAL
UNIT  RH: NORMAL
WBC NRBC COR # BLD AUTO: 7.97 10*3/MM3 (ref 3.4–10.8)

## 2024-08-23 PROCEDURE — 25010000002 ERTAPENEM PER 500 MG: Performed by: STUDENT IN AN ORGANIZED HEALTH CARE EDUCATION/TRAINING PROGRAM

## 2024-08-23 PROCEDURE — 99232 SBSQ HOSP IP/OBS MODERATE 35: CPT | Performed by: INTERNAL MEDICINE

## 2024-08-23 PROCEDURE — 85025 COMPLETE CBC W/AUTO DIFF WBC: CPT | Performed by: SURGERY

## 2024-08-23 PROCEDURE — 99222 1ST HOSP IP/OBS MODERATE 55: CPT | Performed by: STUDENT IN AN ORGANIZED HEALTH CARE EDUCATION/TRAINING PROGRAM

## 2024-08-23 PROCEDURE — 99232 SBSQ HOSP IP/OBS MODERATE 35: CPT | Performed by: STUDENT IN AN ORGANIZED HEALTH CARE EDUCATION/TRAINING PROGRAM

## 2024-08-23 PROCEDURE — 82948 REAGENT STRIP/BLOOD GLUCOSE: CPT

## 2024-08-23 RX ORDER — METOPROLOL SUCCINATE 25 MG/1
25 TABLET, EXTENDED RELEASE ORAL
Status: DISCONTINUED | OUTPATIENT
Start: 2024-08-23 | End: 2024-08-28 | Stop reason: HOSPADM

## 2024-08-23 RX ADMIN — CIPROFLOXACIN 2 DROP: 3 SOLUTION OPHTHALMIC at 12:35

## 2024-08-23 RX ADMIN — SENNOSIDES AND DOCUSATE SODIUM 2 TABLET: 50; 8.6 TABLET ORAL at 08:22

## 2024-08-23 RX ADMIN — Medication 10 ML: at 20:01

## 2024-08-23 RX ADMIN — CIPROFLOXACIN 2 DROP: 3 SOLUTION OPHTHALMIC at 20:02

## 2024-08-23 RX ADMIN — LANSOPRAZOLE 30 MG: 15 TABLET, ORALLY DISINTEGRATING ORAL at 06:01

## 2024-08-23 RX ADMIN — CIPROFLOXACIN 2 DROP: 3 SOLUTION OPHTHALMIC at 04:30

## 2024-08-23 RX ADMIN — CIPROFLOXACIN 2 DROP: 3 SOLUTION OPHTHALMIC at 08:23

## 2024-08-23 RX ADMIN — SENNOSIDES AND DOCUSATE SODIUM 2 TABLET: 50; 8.6 TABLET ORAL at 20:01

## 2024-08-23 RX ADMIN — ATORVASTATIN CALCIUM 40 MG: 20 TABLET, FILM COATED ORAL at 08:22

## 2024-08-23 RX ADMIN — LEVOTHYROXINE SODIUM 25 MCG: 25 TABLET ORAL at 06:01

## 2024-08-23 RX ADMIN — ERTAPENEM SODIUM 1000 MG: 1 INJECTION, POWDER, LYOPHILIZED, FOR SOLUTION INTRAMUSCULAR; INTRAVENOUS at 12:35

## 2024-08-23 RX ADMIN — METOPROLOL SUCCINATE 25 MG: 25 TABLET, EXTENDED RELEASE ORAL at 16:11

## 2024-08-23 RX ADMIN — Medication 10 ML: at 08:23

## 2024-08-23 RX ADMIN — CIPROFLOXACIN 2 DROP: 3 SOLUTION OPHTHALMIC at 16:11

## 2024-08-23 RX ADMIN — POLYETHYLENE GLYCOL 3350 17 G: 17 POWDER, FOR SOLUTION ORAL at 08:23

## 2024-08-23 RX ADMIN — CIPROFLOXACIN 2 DROP: 3 SOLUTION OPHTHALMIC at 23:47

## 2024-08-23 NOTE — PLAN OF CARE
Pt. VS WNL. C/o minmial pain, relieved by PO pain meds. Pt. A&O x2-3, forgetful at times. Pt. Received 2 units PRBCs today, tolerated well IV lasix given between. Premeds given prior to infusions. Pt. With FC, cath care complete per protocol, Chlorhexidine bath given per protocol. Pt. Q2H turns. Pt. With multiple wounds, all dressings changed today per wound care RN except left heel wound vac, due to be changed Monday. Pt. Had wound vac placed to coccyx by wound care RN today. Pt. Receiving TFs at goal, TF lines changed. Pt. With diet, minimal PO intake. Pt. Resting comfortably at present, will continue to monitor closely for the remainder of this RN's shift.      Problem: Adult Inpatient Plan of Care  Goal: Plan of Care Review  Outcome: Ongoing, Progressing  Flowsheets (Taken 8/22/2024 2002)  Progress: improving  Plan of Care Reviewed With: patient

## 2024-08-23 NOTE — PROGRESS NOTES
"    Name: Anthony Gallegos ADMIT: 2024   : 1945  PCP: Keshav Eason MD    MRN: [id number] LOS: 3 days   AGE/SEX: 79 y.o. male  ROOM: [facility]     Subjective   Subjective   Feeling \"fine\" today. Denies any N/V/D/abd pain. Tolerating po--just drinking liquids as he does not like the pureed diet. No F/C/NS. No SOA or CP. Making urine. No eye issues today.        Objective   Objective   Vital Signs  Temp:  [97.7 °F (36.5 °C)-98.4 °F (36.9 °C)] 98.4 °F (36.9 °C)  Heart Rate:  [65-74] 73  Resp:  [14-18] 18  BP: (103-116)/(52-63) 111/56  SpO2:  [99 %-100 %] 99 %  on   ;   Device (Oxygen Therapy): room air  Body mass index is 19.81 kg/m².    (No change in exam today)    Physical Exam  Vitals and nursing note reviewed.   Constitutional:       General: He is not in acute distress.     Appearance: He is ill-appearing (chronically). He is not toxic-appearing or diaphoretic.   HENT:      Head: Normocephalic.      Nose: Nose normal.      Mouth/Throat:      Mouth: Mucous membranes are moist.      Pharynx: Oropharynx is clear.      Comments: Voice hoarse  Eyes:      General: No scleral icterus.        Right eye: No discharge.         Left eye: No discharge.      Extraocular Movements: Extraocular movements intact.      Conjunctiva/sclera: Conjunctivae normal.   Cardiovascular:      Rate and Rhythm: Normal rate and regular rhythm.      Pulses: Normal pulses.   Pulmonary:      Effort: Pulmonary effort is normal. No respiratory distress.      Breath sounds: Normal breath sounds. No wheezing or rales.      Comments: Anteriorly   Abdominal:      General: Bowel sounds are normal. There is no distension.      Palpations: Abdomen is soft.      Tenderness: There is no abdominal tenderness.      Comments: PEG site looks good   Genitourinary:     Comments: Clear yellow urine in bag  Musculoskeletal:         General: No swelling.      Cervical back: Neck supple.      Comments: LLE in heel boot, dressing to left foot with wound vac "   Skin:     General: Skin is warm and dry.      Capillary Refill: Capillary refill takes 2 to 3 seconds.      Coloration: Skin is not jaundiced.   Neurological:      General: No focal deficit present.      Mental Status: He is alert and oriented to person, place, and time. Mental status is at baseline.      Cranial Nerves: No cranial nerve deficit.      Coordination: Coordination normal.   Psychiatric:         Mood and Affect: Mood normal.         Behavior: Behavior normal.         Thought Content: Thought content normal.       Results Review     I reviewed the patient's new clinical results.  Results from last 7 days   Lab Units 08/23/24 0234 08/22/24 0611 08/21/24  0543 08/20/24  0537   WBC 10*3/mm3 7.97 6.96 8.30 7.80   HEMOGLOBIN g/dL 9.4* 6.5* 7.4* 7.1*   PLATELETS 10*3/mm3 436 438 484* 437     Results from last 7 days   Lab Units 08/22/24 2323 08/22/24 0611 08/21/24 0543 08/20/24  0537   SODIUM mmol/L 137 141 137 136   POTASSIUM mmol/L 3.8 3.9 4.5 3.8   CHLORIDE mmol/L 105 107 104 105   CO2 mmol/L 26.2 26.6 25.0 27.0   BUN mg/dL 18 15 17 12   CREATININE mg/dL 0.39* 0.46* 0.45* 0.35*   GLUCOSE mg/dL 100* 100* 150* 93   EGFR mL/min/1.73 111.1 105.7 106.4 114.8     Results from last 7 days   Lab Units 08/22/24 2323 08/22/24 0611 08/21/24  0543 08/20/24  0537   ALBUMIN g/dL 2.3* 2.2* 2.4* 2.4*   BILIRUBIN mg/dL 0.2 <0.2 0.2 0.2   ALK PHOS U/L 103 85 89 86   AST (SGOT) U/L 68* 52* 36 50*   ALT (SGPT) U/L 67* 52* 42* 50*     Results from last 7 days   Lab Units 08/22/24 2323 08/22/24 0611 08/21/24  0543 08/20/24  0537   CALCIUM mg/dL 8.4* 8.2* 8.3* 8.3*   ALBUMIN g/dL 2.3* 2.2* 2.4* 2.4*   MAGNESIUM mg/dL 2.0  --   --   --    PHOSPHORUS mg/dL 2.4*  --   --   --        Glucose   Date/Time Value Ref Range Status   08/23/2024 0638 106 70 - 130 mg/dL Final   08/22/2024 2356 112 70 - 130 mg/dL Final   08/22/2024 1617 100 70 - 130 mg/dL Final   08/22/2024 1204 110 70 - 130 mg/dL Final   08/22/2024 0608 114 70 -  130 mg/dL Final   08/21/2024 2353 127 70 - 130 mg/dL Final   08/21/2024 1701 126 70 - 130 mg/dL Final       XR Calcaneus 2+ View Left    Result Date: 8/21/2024  1. Soft tissue ulceration posterior to the calcaneus with no definite bony changes seen. 2. Further evaluation with MRI of the foot could be obtained if clinically indicated.   This report was finalized on 8/21/2024 12:06 PM by Dr. Dario Dumont M.D on Workstation: [id number]       I have personally reviewed all medications:  Scheduled Medications  [START ON 8/24/2024] amoxicillin-clavulanate, 1 tablet, Oral, Q12H  atorvastatin, 40 mg, Oral, Daily  ciprofloxacin, 2 drop, Right Eye, Q4H  ertapenem, 1,000 mg, Intravenous, Q24H  lansoprazole, 30 mg, Per G Tube, Q AM  levothyroxine, 25 mcg, Oral, Q AM  methotrexate, 2.5 mg, Oral, Once per day on Wednesday Thursday  senna-docusate sodium, 2 tablet, Oral, BID   And  polyethylene glycol, 17 g, Oral, Daily  [Held by provider] potassium chloride, 20 mEq, Oral, Daily  sodium chloride, 10 mL, Intravenous, Q12H  terazosin, 1 mg, Per G Tube, Nightly    Infusions  lactated ringers, 9 mL/hr, Last Rate: 9 mL/hr (08/20/24 1148)    Diet  Diet: Regular/House; Texture: Pureed (NDD 1); Fluid Consistency: Honey Thick    I have personally reviewed:  [x]  Laboratory   [x]  Microbiology   []  Radiology   [x]  EKG/Telemetry  []  Cardiology/Vascular   []  Pathology    []  Records       Assessment/Plan     Active Hospital Problems    Diagnosis  POA    **Acute on chronic anemia [D64.9]  Yes    Hypothyroidism (acquired) [E03.9]  Yes    Elevated liver function tests [R79.89]  Yes    Indwelling Mariscal catheter present [Z97.8]  Not Applicable    Thyroid function test abnormal [R94.6]  Yes    Dysphagia [R13.10]  Yes    Malnutrition [E46]  Yes    Feeding by G-tube [Z93.1]  Not Applicable    Hyperlipidemia [E78.5]  Yes    Sacral wound [S31.000A]  Unknown    UTI (urinary tract infection) [N39.0]  Yes    Enlarged prostate [N40.0]  Yes     Essential hypertension [I10]  Yes    Ankylosing spondylitis [M45.9]  Yes      Resolved Hospital Problems   No resolved problems to display.       78yo gentleman with ankylosing spondylitis, HTN, BPH, chronic forde, GERD, HLD, TR[place], and O/P dysphagia chronically on tube feeds, who was sent to ER from facility for Hgb of 6.7.    Acute on chronic iron deficiency anemia/Anemia of chronic disease.  Anemia workup suggestive of chronic disease. Currently on a schedule of Procrit injections as outpt through Hem/Onc. GI has signed off as stool heme neg and family not interested in pursuing endoscopy. Heme/Onc following. Hgb improved after 2 units PRBCs on 8/22. Continue to monitor.    Elevated liver function test.  Numbers fairly stable. Viral hepatitis panel is negative. Okay to continue statin for now    Hypertension.  BPs acceptable if a bit soft on no medications. No evidence of angina or congestive heart failure. Continue to monitor.    Right infectious conjunctivitis.  On Cipro gtts for 7d.  Improving.    ESBL E.coli UTI in a patient with chronic indwelling Forde catheter.  Abx changed to Invanz, completing today. Blood cultures negative. CT A/P showed no complicating urinary features.    Constipation.  Continue Senna-S BID and MiraLAX daily. Moving bowels now. CT 8/19 did suggest proctitis/stool impaction and we will monitor bowel movements closely moving forward.    Ankylosing spondylitis.  Continue methotrexate.    Hypothyroidism.  Newly diagnosed. Initiated low-dose Synthroid, monitor as an outpatient with repeat TFTs in 4 to 6 weeks.     Dysphagia/GERD/Malnutrition.  SLP recommends puréed and honey thickened liquids. Continue G-tube feeds for nutritional support. Continue PPI.    Dyslipidemia.  Continue Lipitor.     Bilateral lower extremity wounds/Sacral and ischial wounds/Left calcaneal osteomyelitis.  Underwent debridement of the sacral/ischial wounds and left heel wound in the operating room on 8/20 by   Jude and Dr. Duval respectively. Continue wound care per Gen Surg recs. Wound vacs placed to heel and sacrum now. Pod recommending palliative tx of what appears to be calcaneal osteomyelitis. Asked ID to weigh in regarding possible chronic therapy, they recommend only short course of treatment as longer courses would not be successful as long as wounds are uncovered. Completing Invanz today for UTI, start 5d of Augmentin tomorrow for wounds per ID recs.  VTach.  21 beat run of asymptomatic VTach overnight. Card has been consulted. Lytes fine.      SCDs for DVT prophylaxis.  Full code.  Discussed with patient and CCP. No family present.  Anticipate discharge  back to SNF,  timing yet to be determined.  Expected discharge date/ time has not been documented.      Walter Mancia MD  [facility]  08/23/24  11:07 EDT

## 2024-08-23 NOTE — NURSING NOTE
08/23/24 0859   Wound 08/20/24 1259 Left posterior heel Incision   Placement Date/Time: 08/20/24 1259   Side: Left  Orientation: posterior  Location: heel  Primary Wound Type: Incision   Wound Image    Dressing Appearance dry;intact   Closure None   Base clean;granulating;moist;red   Periwound excoriated;macerated;moist;swelling   Periwound Temperature warm   Periwound Skin Turgor soft   Edges open   Wound Length (cm) 6 cm   Wound Width (cm) 6 cm   Wound Depth (cm) 1 cm   Wound Surface Area (cm^2) 36 cm^2   Wound Volume (cm^3) 36 cm^3   Drainage Characteristics/Odor serosanguineous   Drainage Amount small   Care, Wound cleansed with;sterile water;negative pressure wound therapy   Dressing Care dressing changed;foam;transparent film  (Versatel to the wound bed placed)   Periwound Care barrier film applied   NPWT (Negative Pressure Wound Therapy) 08/20/24 1245 left heel   Placement Date/Time: 08/20/24 1245   Location: left heel   Therapy Setting continuous therapy   Dressing foam, black   Contact Layer   (Versatel used to the wound bed)   Pressure Setting 125 mmHg   Sponges Inserted 1   Sponges Removed 1     Wound/Ostomy: Follow up NPWT wound dressing change. Existing dressing removed, the area is cleaned, wound bed described above. Versatel dressing was placed on a wound bed, foolowed by black  foam . Covered with new dressing, good seal noted, patient tolerated well.  We continue with the plan to change m/w/f.  Please re-consult for any additional needs.

## 2024-08-23 NOTE — PLAN OF CARE
Goal Outcome Evaluation:  Plan of Care Reviewed With: patient        Progress: no change  Outcome Evaluation: Pt A&Ox2-3 forgetful at times, wound vac on place, wound care complete as needed, 2 units of PRBC's yesterday, tolerated well, Hgb stable, tube feeds at goal, forde in place, catheter care complete, q2 turns, cardio consulted for Vtach, resting comfortably, will continue to monitor

## 2024-08-23 NOTE — PROGRESS NOTES
LOS: 3 days     Chief Complaint: Calcaneal osteomyelitis and ESBL UTI    Interval History: Patient reports he is feeling much better today.  Denies any acute complaints.  Tolerating antibiotics.    Vital Signs  Temp:  [97.7 °F (36.5 °C)-98.4 °F (36.9 °C)] 98.4 °F (36.9 °C)  Heart Rate:  [65-74] 73  Resp:  [14-18] 18  BP: (103-116)/(52-63) 111/56    Physical Exam:  General: In no acute distress.  Chronically ill-appearing.  HEENT: Oropharynx clear, moist mucous membranes  Respiratory: Normal work of breathing  Skin: Sacral wound with dressing in place.  Left heel wound with dressing.  Extremities: No edema  Access: Peripheral IV    Antibiotics:  Anti-Infectives (From admission, onward)      Ordered     Dose/Rate Route Frequency Start Stop    08/19/24 1007  ertapenem (INVanz) 1,000 mg in sodium chloride 0.9 % 100 mL MBP        Ordering Provider: Justine Roque MD    1,000 mg  200 mL/hr over 30 Minutes Intravenous Every 24 Hours 08/19/24 1100 08/26/24 1059             Results Review:     I reviewed the patient's new clinical results.    Lab Results   Component Value Date    WBC 7.97 08/23/2024    HGB 9.4 (L) 08/23/2024    HCT 30.3 (L) 08/23/2024    MCV 86.8 08/23/2024     08/23/2024     Lab Results   Component Value Date    GLUCOSE 100 (H) 08/22/2024    BUN 18 08/22/2024    CREATININE 0.39 (L) 08/22/2024    EGFRIFNONA 69 03/27/2018    EGFRIFAFRI 84 03/27/2018    BCR 46.2 (H) 08/22/2024    CO2 26.2 08/22/2024    CALCIUM 8.4 (L) 08/22/2024    PROTENTOTREF 8.2 08/12/2024    ALBUMIN 2.3 (L) 08/22/2024    LABIL2 0.4 (L) 08/12/2024    AST 68 (H) 08/22/2024    ALT 67 (H) 08/22/2024       Microbiology:  8/17 urine culture ESBL E. coli  8/17 blood cultures no growth  8/20 sacral wound culture E. coli  8/20 left heel culture E. coli    Assessment    #Left heel osteomyelitis  #Sacral decubitus wound  #BPH with chronic Mariscal catheter  #ESBL E. coli UTI  #Ankylosing spondylitis  #Malnutrition and dysphagia status post  G-tube    Cultures from the left heel and sacrum are growing E. coli.  Plan for 1 more dose of ertapenem 1 g daily today for UTI and follow this up with 5 more days of skin and soft tissue coverage for the left heel and sacrum.  Will transition to Augmentin 875 twice daily.  End date will be 8/29.    Therapy no benefit at this time for definitive therapy of osteomyelitis with long-term antibiotics given remaining open wounds.  Would only be considered if patient had appropriate skin coverage of the involved areas.    Thank you for allowing me to be involved in the care of this patient. Infectious diseases will sign off at this time with antibiotics plan in place, but please call me at [phone] if any further ID questions or new ID concerns.

## 2024-08-23 NOTE — PROGRESS NOTES
Continued Stay Note  [facility]     Patient Name: Anthony Gallegos  MRN: [id number]  Today's Date: 8/23/2024    Admit Date: 8/17/2024    Plan: Return to [facility]   Discharge Plan       Row Name 08/23/24 1439       Plan    Plan Return to [facility]    Plan Comments Spoke with Maddie/[facility], and they are aware that patient will need 2 wound vacs.  They will likely not order until DC date is more sure.  Packet in CCP office.  CCP following.  BeckyH RN Becky S. [place], RN

## 2024-08-23 NOTE — PROGRESS NOTES
REASON FOR FOLLOWUP/CHIEF COMPLAINT: anemia   Anemia  HISTORY OF PRESENT ILLNESS:   No new problems.  Denies bleeding    Past Medical History, Past Surgical History, Social History, Family History have been reviewed and are without significant changes except as mentioned.    Review of Systems   Review of Systems   Constitutional:  Negative for activity change.   HENT:  Negative for nosebleeds and trouble swallowing.    Respiratory:  Negative for shortness of breath and wheezing.    Cardiovascular:  Negative for chest pain and palpitations.   Gastrointestinal:  Negative for constipation, diarrhea and nausea.   Genitourinary:  Negative for dysuria and hematuria.   Musculoskeletal:  Negative for arthralgias and myalgias.   Neurological:  Negative for seizures and syncope.   Hematological:  Negative for adenopathy. Does not bruise/bleed easily.   Psychiatric/Behavioral:  Negative for confusion.        Medications:  The current medication list was reviewed in the EMR    ALLERGIES:  No Known Allergies           Vitals:    08/22/24 2240 08/22/24 2339 08/23/24 0604 08/23/24 0725   BP: 109/58 106/63  111/56   BP Location:  Left arm  Left arm   Patient Position:  Lying  Lying   Pulse:  74  73   Resp:  15  18   Temp:  97.9 °F (36.6 °C)  98.4 °F (36.9 °C)   TempSrc:  Oral  Oral   SpO2:    99%   Weight:   71.9 kg (158 lb 8.2 oz)    Height:         Physical Exam    CONSTITUTIONAL:  Vital signs reviewed.  No distress, looks comfortable.  EYES:  Conjunctivae and lids unremarkable.  PERRLA  EARS, NOSE, MOUTH, THROAT:  Ears and nose appear unremarkable.  Lips, teeth, gums appear unremarkable.  RESPIRATORY:  Normal respiratory effort.  Lungs clear to auscultation bilaterally.  CARDIOVASCULAR:  Normal S1, S2.  No murmurs, rubs or gallops.  No significant lower extremity edema.  GASTROINTESTINAL: Abdomen appears unremarkable.  Nontender.  No hepatomegaly.  No splenomegaly.  NEURO: Cranial nerves 2-12 grossly intact.  No focal  deficits.  Appears to have equal strength all 4 extremities.  MUSCULOSKELETAL:  Unremarkable digits/nails.  No cyanosis or clubbing.  SKIN:  Warm.  No rashes.  PSYCHIATRIC:  Normal judgment and insight.  Normal mood and affect.        RECENT LABS:  WBC   Date Value Ref Range Status   08/23/2024 7.97 3.40 - 10.80 10*3/mm3 Final   08/22/2024 6.96 3.40 - 10.80 10*3/mm3 Final   08/21/2024 8.30 3.40 - 10.80 10*3/mm3 Final     Hemoglobin   Date Value Ref Range Status   08/23/2024 9.4 (L) 13.0 - 17.7 g/dL Final   08/22/2024 6.5 (C) 13.0 - 17.7 g/dL Final   08/21/2024 7.4 (L) 13.0 - 17.7 g/dL Final     Platelets   Date Value Ref Range Status   08/23/2024 436 140 - 450 10*3/mm3 Final   08/22/2024 438 140 - 450 10*3/mm3 Final   08/21/2024 484 (H) 140 - 450 10*3/mm3 Final       ASSESSMENT/PLAN:  Anthony Gallegos S606/1     *Normocytic anemia (reason for admission)  Transferred from his nursing home for a hemoglobin of 6.7  Ferritin 869, iron 14, vitamin B12 914, folic acid greater than 20, haptoglobin 361  He was seen by Dr. Quarles for initial consultation in the office on 8/12/2024.  Ferritin was high, iron low.  His plan was to initiate Procrit every couple of weeks and he has a follow-up appointment scheduled next month.  He has not yet received Procrit.  GI following with consideration of endoscopy  Ferritin 869, 8% saturation.  With the elevated ferritin, IV iron was not given.  Seen by Dr. Quarles on 8/12/2024.  He planned Procrit 20,000 units every 2 weeks for anemia of chronic disease and arranged appointments in the office.  8/19/2024: Procrit 20,000 units for anemia of chronic disease as per Dr. Quarles, his outpatient hematologist  Hb 9.4 on 8/23/2024, from6.5 on 8/22/2024 (transfusing 2 units), from 7.4, from 7.1, from 7.2, from 7.3     *Ankylosing spondylitis  On methotrexate which can certainly be contributing to anemia     *Elevated TSH  Now on levothyroxine for hypothyroidism      *PEG tube in place for nutrition    "  Elevated IgG level on serum immunofixation but no M spike.  Elevated light chains with a ratio is nearly normal.  Therefore, no evidence for monoclonal gammopathy.    *forde asso uti in the setting of history of urine outflow obstruction requiring chronic forde. On antibiotics through hospitalist service.    *Sacral and ischial pressure wounds.  Surgical debridement occurred 8/20/2024.    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 Duval, podiatry stated \"likely osteomyelitis of the calcaneus.  Do not believe MRI is required given the OR findings, depth of ulcer, and culture present.  Believe palliative care of the osteomyelitis likely presents would be the best option for this patient.\"  ID plans antibiotics through 8/29/2024.  They state no benefit for definitive therapy of osteomyelitis with long-term antibiotics due to remaining open wounds.  ID states would only consider if patient had appropriate skin coverage of the involved areas.    *Increased monocytes and eosinophils, not likely clinically significant.     RECOMMENDATIONS/PLAN:   Daily CBC.  Transfuse as needed to maintain hemoglobin greater than 7.  He currently has labs and a Procrit injection scheduled on 8/26 and 9/11 with an appointment with Dr. Quarles on that day.  Noted podiatry states likely osteomyelitis of calcaneus and they recommend palliative care of the osteomyelitis    Reviewed ID note and general surgery note  "

## 2024-08-23 NOTE — PROGRESS NOTES
Nutrition Services    Patient Name:  Anthony Gallegos  YOB: 1945  MRN: [id number]  Admit Date:  8/17/2024    Nutrition Services    Patient Name: Anthony Gallegos  YOB: 1945  MRN: [id number]  Admission date: 8/17/2024    PROGRESS NOTE      Encounter Information: Checking in on TF tolerance/po intake. Wound VAC in place.       PO Diet: Diet: Regular/House; Texture: Pureed (NDD 1); Fluid Consistency: Honey Thick   PO Supplements: Erich BID  Magic Cup BID   PO Intake:  Minimal per EMR       Current nutrition support: Nutren 2.0 at 45 mL/hr. Free water flush of 30 mL q 4 hrs.   Nutrition support review: Tolerating TF at goal per EMR. MD to manage free water flushes.       Labs (reviewed below): Glu 100/112/106       GI Function:  Fecal incontinence, last BM 8/22       Nutrition Intervention Updates: Plan/Recommendations  Good po intake encouraged and will monitor  Continue Magic Cups BID (L/D) to support po intake  Gtube feeds of Nutren 2.0 at goal of 45ml/hr  Water flushes [id number] to manage  Erich 1 pkg bolus BID via gtube for wound healing  If po intake improves, consider nocturnal feeds    RD to follow     Results from last 7 days   Lab Units 08/22/24  2323 08/22/24  0611 08/21/24  0543   SODIUM mmol/L 137 141 137   POTASSIUM mmol/L 3.8 3.9 4.5   CHLORIDE mmol/L 105 107 104   CO2 mmol/L 26.2 26.6 25.0   BUN mg/dL 18 15 17   CREATININE mg/dL 0.39* 0.46* 0.45*   CALCIUM mg/dL 8.4* 8.2* 8.3*   BILIRUBIN mg/dL 0.2 <0.2 0.2   ALK PHOS U/L 103 85 89   ALT (SGPT) U/L 67* 52* 42*   AST (SGOT) U/L 68* 52* 36   GLUCOSE mg/dL 100* 100* 150*     Results from last 7 days   Lab Units 08/23/24  0234 08/22/24  2323   MAGNESIUM mg/dL  --  2.0   PHOSPHORUS mg/dL  --  2.4*   HEMOGLOBIN g/dL 9.4*  --    HEMATOCRIT % 30.3*  --      COVID19   Date Value Ref Range Status   03/25/2024 Not Detected Not Detected - Ref. Range Final     Lab Results   Component Value Date    HGBA1C 5.80 (H) 01/23/2024       RD to  follow up per protocol.    Electronically signed by:  Mindy Rowe  08/23/24 07:38 EDT

## 2024-08-23 NOTE — CONSULTS
[facility]        Patient Name: Anthony Gallegos  Age/Sex: 79 y.o. male  : 1945  MRN: [id number]    Date of Admission: 2024  Date of Encounter Visit: 24  Encounter Provider: Jeffrey Lane MD  Referring Provider: Tom Noel MD  Place of Service: [facility] CARDIOLOGY  Patient Care Team:  Keshav Eason MD as PCP - General (Internal Medicine)  Placido Shirley APRN as Referring Physician (Gastroenterology)  Rick Quarles MD as Consulting Physician (Hematology and Oncology)    Subjective:     Chief Complaint: ABN labs     Reason for consult: 24 beat run of VT    History of Present Illness:  Anthony Gallegos is a 79 y.o. male pt with a history of anklyosing spondylitis, HTN, BPH with urine outflow obstruction with indwelling catheter, DISH, GERD, malnutrition, G tube and HLD.     Pt presents from the nursing home for evaluation of hemoglobin of 6.7.  Patient was recently diagnosed with iron deficiency anemia by hematology and was supposed to be scheduled for an iron infusion.  Patient's baseline hemoglobin is between 8 and 9.  Patient denied any abdominal pain, nausea or vomiting.  Patient reports no diarrhea or constipation or bleeding.  Patient is not on any anticoagulation.  He denies any new weakness or fatigue, dizziness or loss of consciousness.  In ER, hemoglobin 7.5, platelets 498, INR 1.5 sodium 134, albumin 2.6, ALT 61, AST 62.  UA suggests UTI.    GI following for low hemoglobin and anemia.  Hematology following the patient started Procrit.  Fecal occult stools have been negative.  Patient will be managed conservatively for anemia.  Surgery following for sacral and ischial pressure wounds.    Patient had debridement of sacral and ischial pressure wounds on 2024.  Factious disease following for calcaneal osteomyelitis and ESBL UTI        I have been asked to see for 24 beat run of  VT. this was noted on pharmacy monitoring.   The patient had no symptoms.  He denies any previous cardiac history and did not have any palpitations or any other symptoms associated with it.        Prior Cardiac Testing:        Past Medical History:  Past Medical History:   Diagnosis Date    Abscess of scrotum     MARTITA (acute kidney injury)     Altered mental state     Arthritis     AS (ankylosing spondylitis)     History of MRSA infection     Hypertension     Leukocytosis     Tetrahydrocannabinol (THC) use disorder, mild, abuse 12/20/2023    Uveitis        Past Surgical History:   Procedure Laterality Date    COLONOSCOPY      ENDOSCOPY W/ PEG TUBE PLACEMENT N/A 3/29/2024    Procedure: ESOPHAGOGASTRODUODENOSCOPY WITH PERCUTANEOUS ENDOSCOPIC GASTROSTOMY TUBE INSERTION;  Surgeon: Khadar Broderick MD;  Location: [facility] ENDOSCOPY;  Service: General;  Laterality: N/A;  PRE/POST - DYSPHAGIA    ROTATOR CUFF REPAIR Right     TOTAL HIP ARTHROPLASTY Left 2014    WOUND DEBRIDEMENT N/A 8/20/2024    Procedure: DEBRIDEMENT SACRAL ULCER/WOUND;  Surgeon: Justine Roque MD;  Location: [facility] OR;  Service: General;  Laterality: N/A;    WOUND DEBRIDEMENT Left 8/20/2024    Procedure: LEFT DEBRIDEMENT FOOT;  Surgeon: Renny Duval DPM;  Location: [facility] OR;  Service: Podiatry;  Laterality: Left;       Home Medications:   Medications Prior to Admission   Medication Sig Dispense Refill Last Dose    acetaminophen (TYLENOL) 325 MG tablet Take 2 tablets by mouth Every 4 (Four) Hours As Needed for Mild Pain.   8/17/2024    Ascorbic Acid (VITAMIN C PO) Daily.   8/16/2024    atorvastatin (LIPITOR) 40 MG tablet    8/16/2024    bisacodyl (DULCOLAX) 10 MG suppository Insert 1 suppository into the rectum Daily As Needed for Constipation (Use if bisacodyl oral is ineffective).   Past Month    bisacodyl (DULCOLAX) 5 MG EC tablet Take 1 tablet by mouth Daily As Needed for Constipation (Use if polyethylene glycol is ineffective).   Past Week    calcium carbonate (TUMS) 500 MG  chewable tablet Chew 2 tablets 2 (Two) Times a Day As Needed for Heartburn.   8/16/2024    cyclobenzaprine (FLEXERIL) 10 MG tablet TAKE 0.5   1 TABLET BY ORAL ROUTE AT BEDTIME AS NEEDED   8/16/2024    Effer-K 20 MEQ effervescent tablet    8/16/2024    HYDROcodone-acetaminophen (NORCO) 5-325 MG per tablet    Past Month    lansoprazole (PREVACID SOLUTAB) 30 MG Tablet Delayed Release Dispersible disintegrating tablet Administer 1 tablet per G tube Every Morning.   8/16/2024    levothyroxine (SYNTHROID, LEVOTHROID) 25 MCG tablet        melatonin 3 MG tablet Take 1 tablet by mouth At Night As Needed for Sleep.   8/16/2024    Menthol-Zinc Oxide 0.44-20.6 % ointment Apply 1 Application topically to the appropriate area as directed Every 12 (Twelve) Hours.   8/16/2024    methotrexate 2.5 MG tablet Take 1 tablet by mouth 1 (One) Time Per Week. Take 2.5 mg on Wednesday and 2.5 mg on Thursday.  Do not take any medication on Friday through Tuesday.   8/16/2024    mupirocin (BACTROBAN) 2 % ointment Apply 1 Application topically to the appropriate area as directed Every 12 (Twelve) Hours. Apply to Penis Abrasion for 3 more days   8/16/2024    Omega-3 Fatty Acids (OMEGA 3 PO) Take 1 capsule by mouth Daily.   8/16/2024    potassium chloride (KAYCIEL) 20 mEq/15 mL solution Take 15 mL by mouth Daily.   8/16/2024    Santyl 250 UNIT/GM ointment        sennosides-docusate (PERICOLACE) 8.6-50 MG per tablet Take 2 tablets by mouth 2 (Two) Times a Day As Needed for Constipation.   Past Week    terazosin (HYTRIN) 1 MG capsule Administer 1 capsule per G tube Every Night.   8/16/2024    castor oil-balsam peru (VENELEX) ointment Apply 1 Application topically to the appropriate area as directed Every 12 (Twelve) Hours. Apply to foot, heel, and ankle wounds per wound care order.   Unknown    polyethylene glycol (MIRALAX) 17 g packet Take 17 g by mouth Daily As Needed (Use if senna-docusate is ineffective).   More than a month    VITAMIN D PO  Daily.   More than a month       Allergies:  No Known Allergies    Past Social History:  Social History     Socioeconomic History    Marital status:     Number of children: 2   Tobacco Use    Smoking status: Never    Smokeless tobacco: Never   Vaping Use    Vaping status: Never Used   Substance and Sexual Activity    Alcohol use: No    Drug use: No    Sexual activity: Defer       Past Family History:  Family History   Problem Relation Age of Onset    Alzheimer's disease Mother     Colon cancer Neg Hx     Colon polyps Neg Hx     Crohn's disease Neg Hx     Irritable bowel syndrome Neg Hx     Ulcerative colitis Neg Hx     Malig Hyperthermia Neg Hx        REVIEW OF SYSTEMS:   14 point ROS was performed and is negative except as outlined in HPI          Objective:   Temp:  [97.9 °F (36.6 °C)-98.4 °F (36.9 °C)] 98.4 °F (36.9 °C)  Heart Rate:  [65-74] 73  Resp:  [14-18] 18  BP: (106-116)/(52-63) 111/56     Intake/Output Summary (Last 24 hours) at 8/23/2024 1421  Last data filed at 8/23/2024 1004  Gross per 24 hour   Intake 1812 ml   Output 2150 ml   Net -338 ml     Body mass index is 19.81 kg/m².      08/21/24  0518 08/22/24  0624 08/23/24  0604   Weight: 73.8 kg (162 lb 11.2 oz) 69.9 kg (154 lb 1.6 oz) (all weights removed from bed) 71.9 kg (158 lb 8.2 oz)     Weight change: 2 kg (4 lb 6.6 oz)    General Appearance:    Alert, cooperative, appears frail and chronically ill   Throat:   oral mucosa moist   Neck:   supple, trachea midline, no thyromegaly, no carotid bruit, no significant JVD   Lungs:     Clear to auscultation,respirations regular, even and unlabored     Heart:    Regular rhythm and normal rate, normal S1 and S2, 2/4 diastolic murmur at left lower sternal border, no gallop, no rub, no click   Chest Wall:    No abnormalities observed   Abdomen:     nondistended, no guarding, no rebound  tenderness   Extremities:   Moves all extremities well, no significant edema, no cyanosis, no redness   Pulses:    "Pulses palpable and equal bilaterally. Normal radial, carotid, femoral, dorsalis pedis and posterior tibial pulses bilaterally.    Skin:  Psychiatric:   No bleeding, bruising or rash    Alert and oriented x 3, normal mood and affect, somewhat slow to answer questions     Lab Review:   Results from last 7 days   Lab Units 08/22/24  2323 08/22/24  0611 08/21/24  0543 08/20/24  0537 08/19/24  0802 08/18/24  0759 08/17/24  0522   SODIUM mmol/L 137 141 137 136 132* 132* 134*   POTASSIUM mmol/L 3.8 3.9 4.5 3.8 3.7 3.7 4.1   CHLORIDE mmol/L 105 107 104 105 102 99 101   CO2 mmol/L 26.2 26.6 25.0 27.0 25.8 26.0 27.5   BUN mg/dL 18 15 17 12 14 14 12   CREATININE mg/dL 0.39* 0.46* 0.45* 0.35* 0.43* 0.55* 0.54*   GLUCOSE mg/dL 100* 100* 150* 93 123* 94 97   CALCIUM mg/dL 8.4* 8.2* 8.3* 8.3* 8.5* 8.7 8.9   AST (SGOT) U/L 68* 52* 36 50* 49* 44* 62*   ALT (SGPT) U/L 67* 52* 42* 50* 50* 45* 61*         Results from last 7 days   Lab Units 08/23/24  0234 08/22/24  0611 08/21/24  0543 08/20/24  0537 08/19/24  0802 08/19/24  0059 08/18/24  1545 08/18/24  0759 08/17/24  1549 08/17/24  0522   WBC 10*3/mm3 7.97 6.96 8.30 7.80 9.66  --   --  8.79  --  8.55   HEMOGLOBIN g/dL 9.4* 6.5* 7.4* 7.1* 7.2* 7.5* 7.0* 7.3*  7.3*   < > 7.5*   HEMATOCRIT % 30.3* 21.2* 24.1* 23.1* 23.5* 25.1* 23.3* 23.7*  23.7*   < > 23.7*   PLATELETS 10*3/mm3 436 438 484* 437 468*  --   --  519*  --  498*    < > = values in this interval not displayed.     Results from last 7 days   Lab Units 08/17/24  0522   INR  1.52*   APTT seconds 36.1*     Results from last 7 days   Lab Units 08/22/24  2323   MAGNESIUM mg/dL 2.0           Invalid input(s): \"LDLCALC\"      Results from last 7 days   Lab Units 08/17/24  1549   TSH uIU/mL 6.800*       Echo EF Estimated  No results found for: \"ECHOEFEST\"    EKG:                       I personally viewed and interpreted the patient's EKG.  The ECG shows sinus rhythm with LVH but otherwise unremarkable.  No signs of ischemia or " infarction.    Imaging:  Imaging Results (Most Recent)       Procedure Component Value Units Date/Time    XR Calcaneus 2+ View Left [[id number]] Collected: 08/21/24 1204     Updated: 08/21/24 1210    Narrative:      XR CALCANEUS 2+ VW LEFT-8/21/2024     HISTORY: Left heel ulcer.     Left posterior heel soft tissue ulcer is seen with small amount of soft  tissue air. No definite erosive changes of the adjacent calcaneus is  seen. No fractures are seen.       Impression:      1. Soft tissue ulceration posterior to the calcaneus with no definite  bony changes seen.  2. Further evaluation with MRI of the foot could be obtained if  clinically indicated.        This report was finalized on 8/21/2024 12:06 PM by Dr. Dario Dumont M.D on Workstation: [id number]       CT Abdomen Pelvis With Contrast [[id number]] Collected: 08/19/24 1933     Updated: 08/19/24 1946    Narrative:      CT ABDOMEN PELVIS W CONTRAST-     INDICATIONS: Urinary tract infection, history of urine outflow  obstruction     TECHNIQUE: Radiation dose reduction techniques were utilized, including  automated exposure control and exposure modulation based on body size.  Enhanced ABDOMEN AND PELVIS CT     COMPARISON: 6/18/2024     FINDINGS:     Attenuation artifact from left hip arthroplasty hardware partly obscures  structures in the pelvis, and assessment for inflammatory changes in the  abdomen and pelvis is significantly limited by paucity of mesenteric  fat.     Right renal cyst is again demonstrated. No hydronephrosis. Urinary  bladder is catheterized, mostly empty, limiting its assessment.     Otherwise unremarkable appearance of the liver, spleen, adrenal glands,  pancreas, kidneys, bladder.     No bowel obstruction. Moderate colonic fecal retention is present, with  suspected rectal stool impaction. Presacral edema raises suspicion for  proctitis. Correlate clinically. Percutaneous gastrostomy tube is in  place.     No free intraperitoneal gas.  Minimal nonspecific pelvic free fluid.     A 1.2 cm short axis left para-aortic lymph node on axial image 54 is  mildly prominent, nonspecific, could be reactive in nature or  potentially evidence of neoplasm, but appears stable.     Abdominal aorta is not aneurysmal. Aortic and other arterial  calcifications are present.     The lung bases scarring and atelectasis in both lungs, with chronic  pleural-parenchymal change at the right anterior costophrenic angle.     Degenerative changes are seen in the spine. No acute fracture is  identified.             Impression:            1. Possible proctitis/rectal stool impaction, correlate clinically.  Minimal pelvic free fluid. Follow-up as indications persist.     2. No obstructive uropathy.     3. Mildly prominent nonspecific para-aortic lymph node.     This report was finalized on 8/19/2024 7:43 PM by Dr. Len Kennedy M.D on Workstation: [facility]                   Assessment:     Active Hospital Problems    Diagnosis  POA    **Acute on chronic anemia [D64.9]  Yes    Hypothyroidism (acquired) [E03.9]  Yes    Elevated liver function tests [R79.89]  Yes    Indwelling Mariscal catheter present [Z97.8]  Not Applicable    Thyroid function test abnormal [R94.6]  Yes    Dysphagia [R13.10]  Yes    Malnutrition [E46]  Yes    Feeding by G-tube [Z93.1]  Not Applicable    Hyperlipidemia [E78.5]  Yes    Sacral wound [S31.000A]  Unknown    UTI (urinary tract infection) [N39.0]  Yes    Enlarged prostate [N40.0]  Yes    Essential hypertension [I10]  Yes    Ankylosing spondylitis [M45.9]  Yes      Resolved Hospital Problems   No resolved problems to display.          Plan:     NSVT: Asymptomatic.  No sustained runs.  Probably related to his acute illness and increased catecholamine.  He has never had a previous cardiac testing, but since his not symptomatic, and with his multiple comorbid conditions I do not see him gaining any significant benefit from ischemic testing.  Start  metoprolol succinate 25 daily  Diastolic murmur: Noted on exam.  Will check echo.  He has never had a previous cardiac testing, perhaps this could change the medication recommendations.  Sacral decubitus wound.  Medicine team following.  Obviously complicates his overall prognosis, therefore would favor to treat medically pending on results.    Thank you for allowing me to participate in the care of Anthony Gallegos.  Cardiology will follow echo results peripherally.  Feel free to contact me directly with any further questions or concerns.    Jeffrey Lane MD  [facility]  08/23/24  12:06 EDT      Part of this note may be an electronic transcription/translation of spoken language to printed text using the Dragon Dictation System.

## 2024-08-24 ENCOUNTER — APPOINTMENT (OUTPATIENT)
Dept: CARDIOLOGY | Facility: HOSPITAL | Age: 79
DRG: 264 | End: 2024-08-24
Payer: MEDICARE

## 2024-08-24 LAB
ALBUMIN SERPL-MCNC: 2.2 G/DL (ref 3.5–5.2)
ALBUMIN/GLOB SERPL: 0.4 G/DL
ALP SERPL-CCNC: 91 U/L (ref 39–117)
ALT SERPL W P-5'-P-CCNC: 45 U/L (ref 1–41)
ANION GAP SERPL CALCULATED.3IONS-SCNC: 7 MMOL/L (ref 5–15)
AORTIC DIMENSIONLESS INDEX: 0.6 (DI)
AST SERPL-CCNC: 34 U/L (ref 1–40)
BASOPHILS # BLD AUTO: 0.07 10*3/MM3 (ref 0–0.2)
BASOPHILS NFR BLD AUTO: 1 % (ref 0–1.5)
BH CV ECHO MEAS - AO MAX PG: 10.8 MMHG
BH CV ECHO MEAS - AO MEAN PG: 5.1 MMHG
BH CV ECHO MEAS - AO V2 MAX: 164 CM/SEC
BH CV ECHO MEAS - AO V2 VTI: 29.8 CM
BH CV ECHO MEAS - AVA(I,D): 2.36 CM2
BH CV ECHO MEAS - EDV(CUBED): 72 ML
BH CV ECHO MEAS - ESV(CUBED): 14.5 ML
BH CV ECHO MEAS - FS: 41.3 %
BH CV ECHO MEAS - IVS/LVPW: 1.05 CM
BH CV ECHO MEAS - IVSD: 1.03 CM
BH CV ECHO MEAS - LAT PEAK E' VEL: 13.7 CM/SEC
BH CV ECHO MEAS - LV MASS(C)D: 136.3 GRAMS
BH CV ECHO MEAS - LV MAX PG: 2.37 MMHG
BH CV ECHO MEAS - LV MEAN PG: 1.49 MMHG
BH CV ECHO MEAS - LV V1 MAX: 77 CM/SEC
BH CV ECHO MEAS - LV V1 VTI: 17.1 CM
BH CV ECHO MEAS - LVIDD: 4.2 CM
BH CV ECHO MEAS - LVIDS: 2.44 CM
BH CV ECHO MEAS - LVOT AREA: 4.1 CM2
BH CV ECHO MEAS - LVOT DIAM: 2.28 CM
BH CV ECHO MEAS - LVPWD: 0.98 CM
BH CV ECHO MEAS - MED PEAK E' VEL: 9.5 CM/SEC
BH CV ECHO MEAS - MV A MAX VEL: 98.5 CM/SEC
BH CV ECHO MEAS - MV DEC SLOPE: 293.1 CM/SEC2
BH CV ECHO MEAS - MV DEC TIME: 0.22 SEC
BH CV ECHO MEAS - MV E MAX VEL: 74.4 CM/SEC
BH CV ECHO MEAS - MV E/A: 0.76
BH CV ECHO MEAS - MV MAX PG: 3.3 MMHG
BH CV ECHO MEAS - MV MEAN PG: 1.32 MMHG
BH CV ECHO MEAS - MV P1/2T: 92.6 MSEC
BH CV ECHO MEAS - MV V2 VTI: 26.5 CM
BH CV ECHO MEAS - MVA(P1/2T): 2.38 CM2
BH CV ECHO MEAS - MVA(VTI): 2.7 CM2
BH CV ECHO MEAS - PA ACC TIME: 0.11 SEC
BH CV ECHO MEAS - PA V2 MAX: 114.1 CM/SEC
BH CV ECHO MEAS - PULM A REVS DUR: 0.12 SEC
BH CV ECHO MEAS - PULM A REVS VEL: 84.4 CM/SEC
BH CV ECHO MEAS - PULM DIAS VEL: 43.5 CM/SEC
BH CV ECHO MEAS - PULM S/D: 1.64
BH CV ECHO MEAS - PULM SYS VEL: 71.3 CM/SEC
BH CV ECHO MEAS - RV MAX PG: 6.1 MMHG
BH CV ECHO MEAS - RV V1 MAX: 123.2 CM/SEC
BH CV ECHO MEAS - RV V1 VTI: 23.8 CM
BH CV ECHO MEAS - SV(LVOT): 70.3 ML
BH CV ECHO MEASUREMENTS AVERAGE E/E' RATIO: 6.41
BH CV XLRA - TDI S': 15.4 CM/SEC
BILIRUB SERPL-MCNC: <0.2 MG/DL (ref 0–1.2)
BUN SERPL-MCNC: 16 MG/DL (ref 8–23)
BUN/CREAT SERPL: 47.1 (ref 7–25)
CALCIUM SPEC-SCNC: 8.6 MG/DL (ref 8.6–10.5)
CHLORIDE SERPL-SCNC: 106 MMOL/L (ref 98–107)
CO2 SERPL-SCNC: 27 MMOL/L (ref 22–29)
CREAT SERPL-MCNC: 0.34 MG/DL (ref 0.76–1.27)
DEPRECATED RDW RBC AUTO: 46.8 FL (ref 37–54)
EGFRCR SERPLBLD CKD-EPI 2021: 115.8 ML/MIN/1.73
EOSINOPHIL # BLD AUTO: 0.44 10*3/MM3 (ref 0–0.4)
EOSINOPHIL NFR BLD AUTO: 6.3 % (ref 0.3–6.2)
ERYTHROCYTE [DISTWIDTH] IN BLOOD BY AUTOMATED COUNT: 14.8 % (ref 12.3–15.4)
GLOBULIN UR ELPH-MCNC: 5 GM/DL
GLUCOSE BLDC GLUCOMTR-MCNC: 113 MG/DL (ref 70–130)
GLUCOSE BLDC GLUCOMTR-MCNC: 113 MG/DL (ref 70–130)
GLUCOSE SERPL-MCNC: 114 MG/DL (ref 65–99)
HCT VFR BLD AUTO: 30.2 % (ref 37.5–51)
HGB BLD-MCNC: 9.3 G/DL (ref 13–17.7)
IMM GRANULOCYTES # BLD AUTO: 0.03 10*3/MM3 (ref 0–0.05)
IMM GRANULOCYTES NFR BLD AUTO: 0.4 % (ref 0–0.5)
LYMPHOCYTES # BLD AUTO: 1.51 10*3/MM3 (ref 0.7–3.1)
LYMPHOCYTES NFR BLD AUTO: 21.7 % (ref 19.6–45.3)
MAGNESIUM SERPL-MCNC: 2 MG/DL (ref 1.6–2.4)
MCH RBC QN AUTO: 27.1 PG (ref 26.6–33)
MCHC RBC AUTO-ENTMCNC: 30.8 G/DL (ref 31.5–35.7)
MCV RBC AUTO: 88 FL (ref 79–97)
MONOCYTES # BLD AUTO: 0.81 10*3/MM3 (ref 0.1–0.9)
MONOCYTES NFR BLD AUTO: 11.7 % (ref 5–12)
NEUTROPHILS NFR BLD AUTO: 4.09 10*3/MM3 (ref 1.7–7)
NEUTROPHILS NFR BLD AUTO: 58.9 % (ref 42.7–76)
NRBC BLD AUTO-RTO: 0 /100 WBC (ref 0–0.2)
PHOSPHATE SERPL-MCNC: 2.6 MG/DL (ref 2.5–4.5)
PLATELET # BLD AUTO: 406 10*3/MM3 (ref 140–450)
PMV BLD AUTO: 8.3 FL (ref 6–12)
POTASSIUM SERPL-SCNC: 3.8 MMOL/L (ref 3.5–5.2)
PROT SERPL-MCNC: 7.2 G/DL (ref 6–8.5)
RBC # BLD AUTO: 3.43 10*6/MM3 (ref 4.14–5.8)
SODIUM SERPL-SCNC: 140 MMOL/L (ref 136–145)
WBC NRBC COR # BLD AUTO: 6.95 10*3/MM3 (ref 3.4–10.8)

## 2024-08-24 PROCEDURE — 93306 TTE W/DOPPLER COMPLETE: CPT | Performed by: INTERNAL MEDICINE

## 2024-08-24 PROCEDURE — 93306 TTE W/DOPPLER COMPLETE: CPT

## 2024-08-24 PROCEDURE — 99232 SBSQ HOSP IP/OBS MODERATE 35: CPT | Performed by: INTERNAL MEDICINE

## 2024-08-24 PROCEDURE — 82948 REAGENT STRIP/BLOOD GLUCOSE: CPT

## 2024-08-24 PROCEDURE — 84100 ASSAY OF PHOSPHORUS: CPT | Performed by: HOSPITALIST

## 2024-08-24 PROCEDURE — 85025 COMPLETE CBC W/AUTO DIFF WBC: CPT | Performed by: SURGERY

## 2024-08-24 PROCEDURE — 80053 COMPREHEN METABOLIC PANEL: CPT | Performed by: SURGERY

## 2024-08-24 PROCEDURE — 83735 ASSAY OF MAGNESIUM: CPT | Performed by: HOSPITALIST

## 2024-08-24 RX ORDER — AMOXICILLIN 250 MG
2 CAPSULE ORAL 2 TIMES DAILY
Status: DISCONTINUED | OUTPATIENT
Start: 2024-08-24 | End: 2024-08-28 | Stop reason: HOSPADM

## 2024-08-24 RX ORDER — POLYETHYLENE GLYCOL 3350 17 G/17G
17 POWDER, FOR SOLUTION ORAL 2 TIMES DAILY
Status: DISCONTINUED | OUTPATIENT
Start: 2024-08-24 | End: 2024-08-28 | Stop reason: HOSPADM

## 2024-08-24 RX ADMIN — CIPROFLOXACIN 2 DROP: 3 SOLUTION OPHTHALMIC at 03:56

## 2024-08-24 RX ADMIN — Medication 10 ML: at 08:43

## 2024-08-24 RX ADMIN — POLYETHYLENE GLYCOL 3350 17 G: 17 POWDER, FOR SOLUTION ORAL at 21:47

## 2024-08-24 RX ADMIN — CIPROFLOXACIN 2 DROP: 3 SOLUTION OPHTHALMIC at 08:42

## 2024-08-24 RX ADMIN — SENNOSIDES AND DOCUSATE SODIUM 2 TABLET: 50; 8.6 TABLET ORAL at 21:48

## 2024-08-24 RX ADMIN — AMOXICILLIN AND CLAVULANATE POTASSIUM 1 TABLET: 875; 125 TABLET, FILM COATED ORAL at 08:42

## 2024-08-24 RX ADMIN — LEVOTHYROXINE SODIUM 25 MCG: 25 TABLET ORAL at 06:28

## 2024-08-24 RX ADMIN — LANSOPRAZOLE 30 MG: 15 TABLET, ORALLY DISINTEGRATING ORAL at 06:28

## 2024-08-24 RX ADMIN — AMOXICILLIN AND CLAVULANATE POTASSIUM 1 TABLET: 875; 125 TABLET, FILM COATED ORAL at 21:47

## 2024-08-24 RX ADMIN — ATORVASTATIN CALCIUM 40 MG: 20 TABLET, FILM COATED ORAL at 08:43

## 2024-08-24 RX ADMIN — SENNOSIDES AND DOCUSATE SODIUM 2 TABLET: 50; 8.6 TABLET ORAL at 08:42

## 2024-08-24 RX ADMIN — POLYETHYLENE GLYCOL 3350 17 G: 17 POWDER, FOR SOLUTION ORAL at 08:42

## 2024-08-24 RX ADMIN — CIPROFLOXACIN 2 DROP: 3 SOLUTION OPHTHALMIC at 11:49

## 2024-08-24 RX ADMIN — Medication 10 ML: at 21:48

## 2024-08-24 NOTE — PLAN OF CARE
Goal Outcome Evaluation:  Plan of Care Reviewed With: patient        Progress: no change  Outcome Evaluation: Pt A&Ox2-3, forgetful, wound care complete per order, wound vac in place, tube feeds at goal, forde in place, catheter care complete, q2 turns, no new complaints tonight, VSS

## 2024-08-24 NOTE — NURSING NOTE
RN made an additional attempt to place call out to ID provider; automated voicemail completed again. Pt wound/tissue cultures have resulted and requesting provider adjustments if needed. Will wait for return call per attending provider, MD Gavino, request.      MD Catarino, stated no changes made today and will review tomorrow via secure chat.     Will continue to monitor and await further orders per MD.

## 2024-08-24 NOTE — PROGRESS NOTES
"    Name: Anthony Gallegos ADMIT: 2024   : 1945  PCP: Keshav Eason MD    MRN: [id number] LOS: 4 days   AGE/SEX: 79 y.o. male  ROOM: [facility]     Subjective   Subjective   Feeling \"fine\" again today. Denies any N/V/D/abd pain. Tolerating po--just drinking liquids as he does not like the pureed diet. No F/C/NS. No SOA or CP. Making urine.        Objective   Objective   Vital Signs  Temp:  [97.5 °F (36.4 °C)-98.4 °F (36.9 °C)] 98.4 °F (36.9 °C)  Heart Rate:  [66-72] 70  Resp:  [15-20] 20  BP: ()/(47-66) 105/52  SpO2:  [97 %-99 %] 98 %  on   ;   Device (Oxygen Therapy): room air  Body mass index is 19.75 kg/m².    (No change in exam today)    Physical Exam  Vitals and nursing note reviewed.   Constitutional:       General: He is not in acute distress.     Appearance: He is ill-appearing (chronically). He is not toxic-appearing or diaphoretic.   HENT:      Head: Normocephalic.      Nose: Nose normal.      Mouth/Throat:      Mouth: Mucous membranes are moist.      Pharynx: Oropharynx is clear.      Comments: Voice hoarse  Eyes:      General: No scleral icterus.        Right eye: No discharge.         Left eye: No discharge.      Extraocular Movements: Extraocular movements intact.      Conjunctiva/sclera: Conjunctivae normal.   Cardiovascular:      Rate and Rhythm: Normal rate and regular rhythm.      Pulses: Normal pulses.   Pulmonary:      Effort: Pulmonary effort is normal. No respiratory distress.      Breath sounds: Normal breath sounds. No wheezing or rales.      Comments: Anteriorly   Abdominal:      General: Bowel sounds are normal. There is no distension.      Palpations: Abdomen is soft.      Tenderness: There is no abdominal tenderness.      Comments: PEG site looks good   Genitourinary:     Comments: Clear yellow urine in bag  Musculoskeletal:         General: No swelling.      Cervical back: Neck supple.      Comments: Heel boots in place  Dressing to left foot with wound vac   Skin:     " General: Skin is warm and dry.      Capillary Refill: Capillary refill takes 2 to 3 seconds.      Coloration: Skin is not jaundiced.   Neurological:      General: No focal deficit present.      Mental Status: He is alert and oriented to person, place, and time. Mental status is at baseline.      Cranial Nerves: No cranial nerve deficit.      Coordination: Coordination normal.   Psychiatric:         Mood and Affect: Mood normal.         Behavior: Behavior normal.         Thought Content: Thought content normal.       Results Review     I reviewed the patient's new clinical results.  Results from last 7 days   Lab Units 08/24/24  0308 08/23/24 0234 08/22/24 0611 08/21/24  0543   WBC 10*3/mm3 6.95 7.97 6.96 8.30   HEMOGLOBIN g/dL 9.3* 9.4* 6.5* 7.4*   PLATELETS 10*3/mm3 406 436 438 484*     Results from last 7 days   Lab Units 08/24/24 0308 08/22/24 2323 08/22/24 0611 08/21/24  0543   SODIUM mmol/L 140 137 141 137   POTASSIUM mmol/L 3.8 3.8 3.9 4.5   CHLORIDE mmol/L 106 105 107 104   CO2 mmol/L 27.0 26.2 26.6 25.0   BUN mg/dL 16 18 15 17   CREATININE mg/dL 0.34* 0.39* 0.46* 0.45*   GLUCOSE mg/dL 114* 100* 100* 150*   EGFR mL/min/1.73 115.8 111.1 105.7 106.4     Results from last 7 days   Lab Units 08/24/24 0308 08/22/24 2323 08/22/24 0611 08/21/24  0543   ALBUMIN g/dL 2.2* 2.3* 2.2* 2.4*   BILIRUBIN mg/dL <0.2 0.2 <0.2 0.2   ALK PHOS U/L 91 103 85 89   AST (SGOT) U/L 34 68* 52* 36   ALT (SGPT) U/L 45* 67* 52* 42*     Results from last 7 days   Lab Units 08/24/24  0308 08/22/24 2323 08/22/24 0611 08/21/24  0543   CALCIUM mg/dL 8.6 8.4* 8.2* 8.3*   ALBUMIN g/dL 2.2* 2.3* 2.2* 2.4*   MAGNESIUM mg/dL 2.0 2.0  --   --    PHOSPHORUS mg/dL 2.6 2.4*  --   --        Glucose   Date/Time Value Ref Range Status   08/24/2024 0559 113 70 - 130 mg/dL Final   08/23/2024 2032 119 70 - 130 mg/dL Final   08/23/2024 1259 122 70 - 130 mg/dL Final   08/23/2024 0638 106 70 - 130 mg/dL Final   08/22/2024 2356 112 70 - 130 mg/dL  Final   08/22/2024 1617 100 70 - 130 mg/dL Final   08/22/2024 1204 110 70 - 130 mg/dL Final       No radiology results for the last day    I have personally reviewed all medications:  Scheduled Medications  amoxicillin-clavulanate, 1 tablet, Oral, Q12H  atorvastatin, 40 mg, Oral, Daily  ciprofloxacin, 2 drop, Right Eye, Q4H  lansoprazole, 30 mg, Per G Tube, Q AM  levothyroxine, 25 mcg, Oral, Q AM  methotrexate, 2.5 mg, Oral, Once per day on Wednesday Thursday  metoprolol succinate XL, 25 mg, Oral, Q24H  senna-docusate sodium, 2 tablet, Oral, BID   And  polyethylene glycol, 17 g, Oral, Daily  [Held by provider] potassium chloride, 20 mEq, Oral, Daily  sodium chloride, 10 mL, Intravenous, Q12H  terazosin, 1 mg, Per G Tube, Nightly    Infusions  lactated ringers, 9 mL/hr, Last Rate: 9 mL/hr (08/20/24 1148)    Diet  Diet: Regular/House; Texture: Pureed (NDD 1); Fluid Consistency: Honey Thick    I have personally reviewed:  [x]  Laboratory   [x]  Microbiology   []  Radiology   [x]  EKG/Telemetry  [x]  Cardiology/Vascular   []  Pathology    []  Records       Assessment/Plan     Active Hospital Problems    Diagnosis  POA    **Acute on chronic anemia [D64.9]  Yes    Hypothyroidism (acquired) [E03.9]  Yes    Elevated liver function tests [R79.89]  Yes    Indwelling Forde catheter present [Z97.8]  Not Applicable    Thyroid function test abnormal [R94.6]  Yes    Dysphagia [R13.10]  Yes    Malnutrition [E46]  Yes    Feeding by G-tube [Z93.1]  Not Applicable    Hyperlipidemia [E78.5]  Yes    Sacral wound [S31.000A]  Unknown    UTI (urinary tract infection) [N39.0]  Yes    Enlarged prostate [N40.0]  Yes    Essential hypertension [I10]  Yes    Ankylosing spondylitis [M45.9]  Yes      Resolved Hospital Problems   No resolved problems to display.       78yo gentleman with ankylosing spondylitis, HTN, BPH, chronic forde, GERD, HLD, [place], and O/P dysphagia chronically on tube feeds, who was sent to ER from facility for Hgb of  6.7.    Acute on chronic iron deficiency anemia  Anemia of chronic disease.  Anemia workup suggests chronic disease. Currently on a schedule of Procrit injections as outpt through Hem/Onc. GI has signed off as stool heme neg and family not interested in pursuing endoscopy. Heme/Onc following. Hgb improved/stable after 2 units PRBCs on 8/22. Continue to monitor.    Elevated liver function test.  Numbers better today. Viral hepatitis panel is negative. Okay to continue statin for now    Hypertension.  BPs acceptable if a bit soft. On no medications. No evidence of angina or congestive heart failure. Continue to monitor.    Right infectious conjunctivitis.  On Cipro gtts for 7d.  Improved.    ESBL E.coli UTI in a patient with chronic indwelling Mariscal catheter.  Has completed course of Invanz. Blood cultures negative. CT A/P showed no complicating urinary features.    Constipation.  Continue Senna-S BID and MiraLAX daily. Moving bowels now. CT 8/19 did suggest proctitis/stool impaction and we will monitor bowel movements closely moving forward.    Ankylosing spondylitis.  Continue methotrexate.    Hypothyroidism.  Newly diagnosed. Initiated low-dose Synthroid, monitor as an outpatient with repeat TFTs in 4 to 6 weeks.     Dysphagia  GERD  Malnutrition.  SLP recommends puréed and honey thickened liquids. Pt not interested in this diet but will drink thickened liquids. Continue G-tube feeds for nutritional support. Continue PPI.    Dyslipidemia.  Continue Lipitor.     Bilateral lower extremity wounds  Sacral and ischial wounds  Left calcaneal osteomyelitis.  Underwent debridement of the sacral/ischial wounds and left heel wound in the operating room on 8/20 by Dr. Roque and Dr. Duval respectively. Continue wound care per Gen Surg recs. Wound vacs placed to heel and sacrum now. Pod recommending palliative tx of what appears to be calcaneal osteomyelitis. Asked ID to weigh in regarding possible chronic therapy, they  recommend only short course of treatment as longer courses would not be successful as long as wounds are uncovered. Has completed course of Invanz for UTI and started 5d of Augmentin now for wounds per ID recs.    Non-sustained VTach.  21 beat run of asymptomatic VTach the other night. Teddy fine. Card was consulted. They recommend no further tx or w/u, but did want to check Echo as he has a heart murmur--Echo pending today.      SCDs for DVT prophylaxis.  Full code.  Discussed with patient and RN.  Anticipate discharge  back to SNF,  timing yet to be determined.  Expected Discharge Date: 8/26/2024; Expected Discharge Time:       Walter Mancia MD  [facility]  08/24/24  15:08 EDT

## 2024-08-24 NOTE — NURSING NOTE
RN contacted ID per attending provider MD Gavino, request, to provide status update on wound/tissue culture results and if adjustments need to be made to current antibiotic. Automated voicemail was completed; will wait for returned call.

## 2024-08-24 NOTE — PROGRESS NOTES
REASON FOR FOLLOWUP/CHIEF COMPLAINT: anemia   Anemia  HISTORY OF PRESENT ILLNESS:   Denies bleeding.  No new issues    Past Medical History, Past Surgical History, Social History, Family History have been reviewed and are without significant changes except as mentioned.    Review of Systems   Review of Systems   Constitutional:  Negative for activity change.   HENT:  Negative for nosebleeds and trouble swallowing.    Respiratory:  Negative for shortness of breath and wheezing.    Cardiovascular:  Negative for chest pain and palpitations.   Gastrointestinal:  Negative for constipation, diarrhea and nausea.   Genitourinary:  Negative for dysuria and hematuria.   Musculoskeletal:  Negative for arthralgias and myalgias.   Neurological:  Negative for seizures and syncope.   Hematological:  Negative for adenopathy. Does not bruise/bleed easily.   Psychiatric/Behavioral:  Negative for confusion.        Medications:  The current medication list was reviewed in the EMR    ALLERGIES:  No Known Allergies           Vitals:    08/24/24 0725 08/24/24 0728 08/24/24 0730 08/24/24 0842   BP:  100/60  97/47   BP Location:  Left arm     Patient Position:  Lying     Pulse: 67  66 70   Resp:  20     Temp:  97.5 °F (36.4 °C)     TempSrc:  Oral     SpO2:  98%     Weight:       Height:         Physical Exam    CONSTITUTIONAL:  Vital signs reviewed.  No distress, looks comfortable.  EYES:  Conjunctivae and lids unremarkable.  PERRLA  EARS, NOSE, MOUTH, THROAT:  Ears and nose appear unremarkable.  Lips, teeth, gums appear unremarkable.  RESPIRATORY:  Normal respiratory effort.  Lungs clear to auscultation bilaterally.  CARDIOVASCULAR:  Normal S1, S2.  No murmurs, rubs or gallops.  No significant lower extremity edema.  GASTROINTESTINAL: Abdomen appears unremarkable.  Nontender.  No hepatomegaly.  No splenomegaly.  NEURO: Cranial nerves 2-12 grossly intact.  No focal deficits.  Appears to have equal strength all 4  extremities.  MUSCULOSKELETAL:  Unremarkable digits/nails.  No cyanosis or clubbing.  SKIN:  Warm.  No rashes.  PSYCHIATRIC:  Normal judgment and insight.  Normal mood and affect.        RECENT LABS:  WBC   Date Value Ref Range Status   08/24/2024 6.95 3.40 - 10.80 10*3/mm3 Final   08/23/2024 7.97 3.40 - 10.80 10*3/mm3 Final   08/22/2024 6.96 3.40 - 10.80 10*3/mm3 Final     Hemoglobin   Date Value Ref Range Status   08/24/2024 9.3 (L) 13.0 - 17.7 g/dL Final   08/23/2024 9.4 (L) 13.0 - 17.7 g/dL Final   08/22/2024 6.5 (C) 13.0 - 17.7 g/dL Final     Platelets   Date Value Ref Range Status   08/24/2024 406 140 - 450 10*3/mm3 Final   08/23/2024 436 140 - 450 10*3/mm3 Final   08/22/2024 438 140 - 450 10*3/mm3 Final       ASSESSMENT/PLAN:  Anthony Gallegos S606/1     *Normocytic anemia (reason for admission)  Transferred from his nursing home for a hemoglobin of 6.7  Ferritin 869, iron 14, vitamin B12 914, folic acid greater than 20, haptoglobin 361  He was seen by Dr. Quarles for initial consultation in the office on 8/12/2024.  Ferritin was high, iron low.  His plan was to initiate Procrit every couple of weeks and he has a follow-up appointment scheduled next month.  He has not yet received Procrit.  GI following with consideration of endoscopy  Ferritin 869, 8% saturation.  With the elevated ferritin, IV iron was not given.  Seen by Dr. Quarles on 8/12/2024.  He planned Procrit 20,000 units every 2 weeks for anemia of chronic disease and arranged appointments in the office.  8/19/2024: Procrit 20,000 units for anemia of chronic disease as per Dr. Quarles, his outpatient hematologist  Hb 9.3 on 8/24/2024, from 9.4 on 8/23/2024, from6.5 on 8/22/2024 (transfusing 2 units), from 7.4, from 7.1, from 7.2, from 7.3     *Ankylosing spondylitis  On methotrexate which can certainly be contributing to anemia     *Elevated TSH  Now on levothyroxine for hypothyroidism      *PEG tube in place for nutrition     Elevated IgG level on serum  "immunofixation but no M spike.  Elevated light chains with a ratio is nearly normal.  Therefore, no evidence for monoclonal gammopathy.    *forde asso uti in the setting of history of urine outflow obstruction requiring chronic forde. On antibiotics through hospitalist service.    *Sacral and ischial pressure wounds.  Surgical debridement occurred 8/20/2024.    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 Duval, podiatry stated \"likely osteomyelitis of the calcaneus.  Do not believe MRI is required given the OR findings, depth of ulcer, and culture present.  Believe palliative care of the osteomyelitis likely presents would be the best option for this patient.\"  ID plans antibiotics through 8/29/2024.  They state no benefit for definitive therapy of osteomyelitis with long-term antibiotics due to remaining open wounds.  ID states would only consider if patient had appropriate skin coverage of the involved areas.    *Increased monocytes and eosinophils, not likely clinically significant.     RECOMMENDATIONS/PLAN:   Daily CBC.  Transfuse as needed to maintain hemoglobin greater than 7.  He currently has labs and a Procrit injection scheduled on 8/26 and 9/11 with an appointment with Dr. Quarles on that day.  Noted podiatry states likely osteomyelitis of calcaneus and they recommend palliative care of the osteomyelitis    Reviewed podiatryAnd cardiology notes  "

## 2024-08-24 NOTE — PROGRESS NOTES
Podiatry Progress Note      Patient: Anthony Gallegos Admit Date: 08/17/2024    Age: 79 y.o.   PCP: Keshav Eason MD    MRN: [id number]  Room: [facility]        Subjective     Chief Complaint     Chief Complaint   Patient presents with    Abnormal Lab    Urinary Tract Infection        HPI     Patient relates that he has been resting well. Denies any pain to left heel.    Past Medical History     Past Medical History:   Diagnosis Date    Abscess of scrotum     MARTITA (acute kidney injury)     Altered mental state     Arthritis     AS (ankylosing spondylitis)     History of MRSA infection     Hypertension     Leukocytosis     Tetrahydrocannabinol (THC) use disorder, mild, abuse 12/20/2023    Uveitis         Past Surgical History:   Procedure Laterality Date    COLONOSCOPY      ENDOSCOPY W/ PEG TUBE PLACEMENT N/A 3/29/2024    Procedure: ESOPHAGOGASTRODUODENOSCOPY WITH PERCUTANEOUS ENDOSCOPIC GASTROSTOMY TUBE INSERTION;  Surgeon: Khadar Broderick MD;  Location: [facility] ENDOSCOPY;  Service: General;  Laterality: N/A;  PRE/POST - DYSPHAGIA    ROTATOR CUFF REPAIR Right     TOTAL HIP ARTHROPLASTY Left 2014    WOUND DEBRIDEMENT N/A 8/20/2024    Procedure: DEBRIDEMENT SACRAL ULCER/WOUND;  Surgeon: Justine Roque MD;  Location: [facility] OR;  Service: General;  Laterality: N/A;    WOUND DEBRIDEMENT Left 8/20/2024    Procedure: LEFT DEBRIDEMENT FOOT;  Surgeon: Renny Duval DPM;  Location: [facility] OR;  Service: Podiatry;  Laterality: Left;        No Known Allergies     Social History     Tobacco Use   Smoking Status Never   Smokeless Tobacco Never        Objective   Physical Exam    Vitals:    08/24/24 0730   BP:    Pulse: 66   Resp:    Temp:    SpO2:       Dermatology: Wound vac in place to the left heel. No streaking erythema past dressing site.     Vascular: cap refill 3-5 seconds all digits     Neurological: sensation grossly intact light touch     Musckuloskeletal:Lightly flexes and extends ankle      Labs      Lab Results   Component Value Date    HGBA1C 5.80 (H) 01/23/2024    POCGLU 113 08/24/2024    SEDRATE 57 (H) 12/20/2023        CBC:      Lab 08/24/24  0308 08/23/24  0234 08/22/24  0611 08/21/24  0543 08/20/24  0537   WBC 6.95 7.97 6.96 8.30 7.80   HEMOGLOBIN 9.3* 9.4* 6.5* 7.4* 7.1*   HEMATOCRIT 30.2* 30.3* 21.2* 24.1* 23.1*   PLATELETS 406 436 438 484* 437   NEUTROS ABS 4.09 4.80 4.06 6.45 5.32   IMMATURE GRANS (ABS) 0.03 0.02 0.03 0.03 0.04   LYMPHS ABS 1.51 1.75 1.79 1.15 1.18   MONOS ABS 0.81 0.91* 0.80 0.64 0.88   EOS ABS 0.44* 0.41* 0.25 0.00 0.34   MCV 88.0 86.8 82.8 83.1 84.6          Results for orders placed or performed during the hospital encounter of 08/17/24   Blood Culture - Blood, Arm, Right    Specimen: Arm, Right; Blood   Result Value Ref Range    Blood Culture No growth at 5 days         XR Calcaneus 2+ View Left  Narrative: XR CALCANEUS 2+ VW LEFT-8/21/2024     HISTORY: Left heel ulcer.     Left posterior heel soft tissue ulcer is seen with small amount of soft  tissue air. No definite erosive changes of the adjacent calcaneus is  seen. No fractures are seen.     Impression: 1. Soft tissue ulceration posterior to the calcaneus with no definite  bony changes seen.  2. Further evaluation with MRI of the foot could be obtained if  clinically indicated.        This report was finalized on 8/21/2024 12:06 PM by Dr. Dario Dumont M.D on Workstation: [id number]          Assessment/Plan     79M status post debridement pressure ulcer left heel with wound vac placement    -Patient with noted significant improvement in wound image provided by wound care. Thank you for assistance with wound vac.  -Wound vac with good seal today  -Abx per ID. Palliative tx. Augmentin short term  -Bed boot at all times  -No further intervention planned at this time  -Please call with any questions      Renny Duval DPM  [facility] [place]  Office: [phone]

## 2024-08-25 LAB
ALBUMIN SERPL-MCNC: 2.5 G/DL (ref 3.5–5.2)
ALBUMIN/GLOB SERPL: 0.5 G/DL
ALP SERPL-CCNC: 102 U/L (ref 39–117)
ALT SERPL W P-5'-P-CCNC: 36 U/L (ref 1–41)
ANION GAP SERPL CALCULATED.3IONS-SCNC: 4 MMOL/L (ref 5–15)
AST SERPL-CCNC: 27 U/L (ref 1–40)
BACTERIA SPEC AEROBE CULT: ABNORMAL
BASOPHILS # BLD AUTO: 0.05 10*3/MM3 (ref 0–0.2)
BASOPHILS NFR BLD AUTO: 0.6 % (ref 0–1.5)
BILIRUB SERPL-MCNC: 0.2 MG/DL (ref 0–1.2)
BUN SERPL-MCNC: 18 MG/DL (ref 8–23)
BUN/CREAT SERPL: 46.2 (ref 7–25)
CALCIUM SPEC-SCNC: 8.6 MG/DL (ref 8.6–10.5)
CHLORIDE SERPL-SCNC: 105 MMOL/L (ref 98–107)
CO2 SERPL-SCNC: 29 MMOL/L (ref 22–29)
CREAT SERPL-MCNC: 0.39 MG/DL (ref 0.76–1.27)
DEPRECATED RDW RBC AUTO: 48.3 FL (ref 37–54)
EGFRCR SERPLBLD CKD-EPI 2021: 111.1 ML/MIN/1.73
EOSINOPHIL # BLD AUTO: 0.59 10*3/MM3 (ref 0–0.4)
EOSINOPHIL NFR BLD AUTO: 7.1 % (ref 0.3–6.2)
ERYTHROCYTE [DISTWIDTH] IN BLOOD BY AUTOMATED COUNT: 15.4 % (ref 12.3–15.4)
GLOBULIN UR ELPH-MCNC: 4.9 GM/DL
GLUCOSE BLDC GLUCOMTR-MCNC: 111 MG/DL (ref 70–130)
GLUCOSE BLDC GLUCOMTR-MCNC: 122 MG/DL (ref 70–130)
GLUCOSE BLDC GLUCOMTR-MCNC: 245 MG/DL (ref 70–130)
GLUCOSE BLDC GLUCOMTR-MCNC: 95 MG/DL (ref 70–130)
GLUCOSE SERPL-MCNC: 108 MG/DL (ref 65–99)
GRAM STN SPEC: ABNORMAL
HCT VFR BLD AUTO: 33.9 % (ref 37.5–51)
HGB BLD-MCNC: 10.5 G/DL (ref 13–17.7)
IMM GRANULOCYTES # BLD AUTO: 0.02 10*3/MM3 (ref 0–0.05)
IMM GRANULOCYTES NFR BLD AUTO: 0.2 % (ref 0–0.5)
LYMPHOCYTES # BLD AUTO: 1.57 10*3/MM3 (ref 0.7–3.1)
LYMPHOCYTES NFR BLD AUTO: 18.9 % (ref 19.6–45.3)
MAGNESIUM SERPL-MCNC: 2 MG/DL (ref 1.6–2.4)
MCH RBC QN AUTO: 27.3 PG (ref 26.6–33)
MCHC RBC AUTO-ENTMCNC: 31 G/DL (ref 31.5–35.7)
MCV RBC AUTO: 88.1 FL (ref 79–97)
MONOCYTES # BLD AUTO: 0.82 10*3/MM3 (ref 0.1–0.9)
MONOCYTES NFR BLD AUTO: 9.9 % (ref 5–12)
NEUTROPHILS NFR BLD AUTO: 5.27 10*3/MM3 (ref 1.7–7)
NEUTROPHILS NFR BLD AUTO: 63.3 % (ref 42.7–76)
NRBC BLD AUTO-RTO: 0 /100 WBC (ref 0–0.2)
PHOSPHATE SERPL-MCNC: 2.7 MG/DL (ref 2.5–4.5)
PLATELET # BLD AUTO: 415 10*3/MM3 (ref 140–450)
PMV BLD AUTO: 8.5 FL (ref 6–12)
POTASSIUM SERPL-SCNC: 3.8 MMOL/L (ref 3.5–5.2)
PROT SERPL-MCNC: 7.4 G/DL (ref 6–8.5)
RBC # BLD AUTO: 3.85 10*6/MM3 (ref 4.14–5.8)
SODIUM SERPL-SCNC: 138 MMOL/L (ref 136–145)
WBC NRBC COR # BLD AUTO: 8.32 10*3/MM3 (ref 3.4–10.8)

## 2024-08-25 PROCEDURE — 82948 REAGENT STRIP/BLOOD GLUCOSE: CPT

## 2024-08-25 PROCEDURE — 80053 COMPREHEN METABOLIC PANEL: CPT | Performed by: SURGERY

## 2024-08-25 PROCEDURE — 84100 ASSAY OF PHOSPHORUS: CPT | Performed by: HOSPITALIST

## 2024-08-25 PROCEDURE — 83735 ASSAY OF MAGNESIUM: CPT | Performed by: HOSPITALIST

## 2024-08-25 PROCEDURE — 85025 COMPLETE CBC W/AUTO DIFF WBC: CPT | Performed by: SURGERY

## 2024-08-25 PROCEDURE — 99232 SBSQ HOSP IP/OBS MODERATE 35: CPT | Performed by: INTERNAL MEDICINE

## 2024-08-25 RX ADMIN — LANSOPRAZOLE 30 MG: 15 TABLET, ORALLY DISINTEGRATING ORAL at 05:51

## 2024-08-25 RX ADMIN — AMOXICILLIN AND CLAVULANATE POTASSIUM 1 TABLET: 875; 125 TABLET, FILM COATED ORAL at 10:18

## 2024-08-25 RX ADMIN — Medication 10 ML: at 21:50

## 2024-08-25 RX ADMIN — POLYETHYLENE GLYCOL 3350 17 G: 17 POWDER, FOR SOLUTION ORAL at 10:18

## 2024-08-25 RX ADMIN — ATORVASTATIN CALCIUM 40 MG: 20 TABLET, FILM COATED ORAL at 10:18

## 2024-08-25 RX ADMIN — AMOXICILLIN AND CLAVULANATE POTASSIUM 1 TABLET: 875; 125 TABLET, FILM COATED ORAL at 21:49

## 2024-08-25 RX ADMIN — LEVOTHYROXINE SODIUM 25 MCG: 25 TABLET ORAL at 05:51

## 2024-08-25 RX ADMIN — SENNOSIDES AND DOCUSATE SODIUM 2 TABLET: 50; 8.6 TABLET ORAL at 10:18

## 2024-08-25 RX ADMIN — TERAZOSIN 1 MG: 1 CAPSULE ORAL at 21:49

## 2024-08-25 RX ADMIN — Medication 10 ML: at 10:18

## 2024-08-25 RX ADMIN — METOPROLOL SUCCINATE 25 MG: 25 TABLET, EXTENDED RELEASE ORAL at 10:17

## 2024-08-25 NOTE — PLAN OF CARE
Goal Outcome Evaluation:  Plan of Care Reviewed With: patient        Progress: no change  Outcome Evaluation: Wound care completed on bilateral legs, back; Meds crushed to PEG tube; dressing change to PEG tube; tubing and feed changed; skin care; [id number]; pt is extremely stiff- needs a 2 person assist to turn and move; pt grimaces and moans when moved; FC in place; palliative care consult placed.

## 2024-08-25 NOTE — PROGRESS NOTES
REASON FOR FOLLOWUP/CHIEF COMPLAINT: anemia   Anemia  HISTORY OF PRESENT ILLNESS:   No bleeding problems.  No new issues    Past Medical History, Past Surgical History, Social History, Family History have been reviewed and are without significant changes except as mentioned.    Review of Systems   Review of Systems   Constitutional:  Negative for activity change.   HENT:  Negative for nosebleeds and trouble swallowing.    Respiratory:  Negative for shortness of breath and wheezing.    Cardiovascular:  Negative for chest pain and palpitations.   Gastrointestinal:  Negative for constipation, diarrhea and nausea.   Genitourinary:  Negative for dysuria and hematuria.   Musculoskeletal:  Negative for arthralgias and myalgias.   Neurological:  Negative for seizures and syncope.   Hematological:  Negative for adenopathy. Does not bruise/bleed easily.   Psychiatric/Behavioral:  Negative for confusion.        Medications:  The current medication list was reviewed in the EMR    ALLERGIES:  No Known Allergies           Vitals:    08/24/24 2001 08/25/24 0005 08/25/24 0313 08/25/24 0732   BP: 102/54 105/58  108/60   BP Location: Left arm Left arm  Right arm   Patient Position: Lying Lying  Lying   Pulse: 68 67  71   Resp:  16  20   Temp:  98.2 °F (36.8 °C)  98.7 °F (37.1 °C)   TempSrc:  Oral  Oral   SpO2:    99%   Weight:   74.5 kg (164 lb 3.9 oz)    Height:         Physical Exam    CONSTITUTIONAL:  Vital signs reviewed.  No distress, looks comfortable.  EYES:  Conjunctivae and lids unremarkable.  PERRLA  EARS, NOSE, MOUTH, THROAT:  Ears and nose appear unremarkable.  Lips, teeth, gums appear unremarkable.  RESPIRATORY:  Normal respiratory effort.  Lungs clear to auscultation bilaterally.  CARDIOVASCULAR:  Normal S1, S2.  No murmurs, rubs or gallops.  No significant lower extremity edema.  GASTROINTESTINAL: Abdomen appears unremarkable.  Nontender.  No hepatomegaly.  No splenomegaly.  NEURO: Cranial nerves 2-12 grossly  intact.  No focal deficits.  Appears to have equal strength all 4 extremities.  MUSCULOSKELETAL:  Unremarkable digits/nails.  No cyanosis or clubbing.  SKIN:  Warm.  No rashes.  PSYCHIATRIC:  Normal judgment and insight.  Normal mood and affect.        RECENT LABS:  WBC   Date Value Ref Range Status   08/25/2024 8.32 3.40 - 10.80 10*3/mm3 Final   08/24/2024 6.95 3.40 - 10.80 10*3/mm3 Final   08/23/2024 7.97 3.40 - 10.80 10*3/mm3 Final     Hemoglobin   Date Value Ref Range Status   08/25/2024 10.5 (L) 13.0 - 17.7 g/dL Final   08/24/2024 9.3 (L) 13.0 - 17.7 g/dL Final   08/23/2024 9.4 (L) 13.0 - 17.7 g/dL Final     Platelets   Date Value Ref Range Status   08/25/2024 415 140 - 450 10*3/mm3 Final   08/24/2024 406 140 - 450 10*3/mm3 Final   08/23/2024 436 140 - 450 10*3/mm3 Final       ASSESSMENT/PLAN:  Anthony Gallegos [id number]     *Normocytic anemia (reason for admission)  Transferred from his nursing home for a hemoglobin of 6.7  Ferritin 869, iron 14, vitamin B12 914, folic acid greater than 20, haptoglobin 361  He was seen by Dr. Quarles for initial consultation in the office on 8/12/2024.  Ferritin was high, iron low.  His plan was to initiate Procrit every couple of weeks and he has a follow-up appointment scheduled next month.  He has not yet received Procrit.  GI following with consideration of endoscopy  Ferritin 869, 8% saturation.  With the elevated ferritin, IV iron was not given.  Seen by Dr. Quarles on 8/12/2024.  He planned Procrit 20,000 units every 2 weeks for anemia of chronic disease and arranged appointments in the office.  8/19/2024: Procrit 20,000 units for anemia of chronic disease as per Dr. Quarles, his outpatient hematologist  Hb 10.5 on 8/25/2024, from 9.3 on 8/24/2024, from 9.4 on 8/23/2024, from6.5 on 8/22/2024 (transfusing 2 units), from 7.4, from 7.1, from 7.2, from 7.3     *Ankylosing spondylitis  On methotrexate which can certainly be contributing to anemia     *Elevated TSH  Now on  "levothyroxine for hypothyroidism      *PEG tube in place for nutrition     Elevated IgG level on serum immunofixation but no M spike.  Elevated light chains with a ratio is nearly normal.  Therefore, no evidence for monoclonal gammopathy.    *forde asso uti in the setting of history of urine outflow obstruction requiring chronic forde. On antibiotics through hospitalist service.    *Sacral and ischial pressure wounds.  Surgical debridement occurred 8/20/2024.    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 Duval, podiatry stated \"likely osteomyelitis of the calcaneus.  Do not believe MRI is required given the OR findings, depth of ulcer, and culture present.  Believe palliative care of the osteomyelitis likely presents would be the best option for this patient.\"  ID plans antibiotics through 8/29/2024.  They state no benefit for definitive therapy of osteomyelitis with long-term antibiotics due to remaining open wounds.  ID states would only consider if patient had appropriate skin coverage of the involved areas.    *Increased monocytes and eosinophils, not likely clinically significant.     RECOMMENDATIONS/PLAN:   Daily CBC.  Transfuse as needed to maintain hemoglobin greater than 7.  He currently has labs and a Procrit injection scheduled on 8/26 and 9/11 with an appointment with Dr. Quarles on that day.  (I did not sign off this case so that we can check on him tomorrow and see if he would benefit from another Procrit injection as Dr. Quarles had arranged for him to receive outpatient Procrit tomorrow, 8/26/2024).  Noted podiatry states likely osteomyelitis of calcaneus and they recommend palliative care of the osteomyelitis    Reviewed CMP and last 3 glucose results.      "

## 2024-08-25 NOTE — PLAN OF CARE
Goal Outcome Evaluation:  Plan of Care Reviewed With: patient        Progress: no change  Outcome Evaluation: Pt A&Ox2, forgetful, RA, tube feeds at goal, forde in place, catheter care complete, wound care complete per order, wound vac in place, q2 turns, all needs met, VSS

## 2024-08-25 NOTE — PROGRESS NOTES
"    Name: Anthony Gallegos ADMIT: 2024   : 1945  PCP: Keshav Eason MD    MRN: [id number] LOS: 5 days   AGE/SEX: 79 y.o. male  ROOM: [facility]     Subjective   Subjective   No changes. Feeling \"fine\" again today. Denies any N/V/D/abd pain. Tolerating po--just drinking liquids as he does not like the pureed diet. No F/C/NS. No SOA or CP. Making urine.        Objective   Objective   Vital Signs  Temp:  [98.2 °F (36.8 °C)-98.7 °F (37.1 °C)] 98.7 °F (37.1 °C)  Heart Rate:  [67-71] 71  Resp:  [16-20] 20  BP: (102-108)/(52-60) 108/60  SpO2:  [98 %-99 %] 99 %  on   ;   Device (Oxygen Therapy): room air  Body mass index is 20.53 kg/m².    (No change in exam today)    Physical Exam  Vitals and nursing note reviewed. Exam conducted with a chaperone present (RN).   Constitutional:       General: He is not in acute distress.     Appearance: He is ill-appearing (chronically). He is not toxic-appearing or diaphoretic.   HENT:      Head: Normocephalic.      Nose: Nose normal.      Mouth/Throat:      Mouth: Mucous membranes are moist.      Pharynx: Oropharynx is clear.      Comments: Voice hoarse  Eyes:      General: No scleral icterus.        Right eye: No discharge.         Left eye: No discharge.      Extraocular Movements: Extraocular movements intact.      Conjunctiva/sclera: Conjunctivae normal.   Cardiovascular:      Rate and Rhythm: Normal rate and regular rhythm.      Pulses: Normal pulses.   Pulmonary:      Effort: Pulmonary effort is normal. No respiratory distress.      Breath sounds: Normal breath sounds. No wheezing or rales.      Comments: Anteriorly   Abdominal:      General: Bowel sounds are normal. There is no distension.      Palpations: Abdomen is soft.      Tenderness: There is no abdominal tenderness.      Comments: PEG site looks good   Genitourinary:     Comments: Clear yellow urine in bag  Musculoskeletal:         General: No swelling.      Cervical back: Neck supple.      Comments: Heel boots in " place  Dressing to left foot with wound vac   Skin:     General: Skin is warm and dry.      Capillary Refill: Capillary refill takes 2 to 3 seconds.      Coloration: Skin is not jaundiced.   Neurological:      General: No focal deficit present.      Mental Status: He is alert and oriented to person, place, and time. Mental status is at baseline.      Cranial Nerves: No cranial nerve deficit.      Coordination: Coordination normal.   Psychiatric:         Mood and Affect: Mood normal.         Behavior: Behavior normal.         Thought Content: Thought content normal.       Results Review     I reviewed the patient's new clinical results.  Results from last 7 days   Lab Units 08/25/24  0656 08/24/24  0308 08/23/24  0234 08/22/24  0611   WBC 10*3/mm3 8.32 6.95 7.97 6.96   HEMOGLOBIN g/dL 10.5* 9.3* 9.4* 6.5*   PLATELETS 10*3/mm3 415 406 436 438     Results from last 7 days   Lab Units 08/25/24  0656 08/24/24  0308 08/22/24 2323 08/22/24  0611   SODIUM mmol/L 138 140 137 141   POTASSIUM mmol/L 3.8 3.8 3.8 3.9   CHLORIDE mmol/L 105 106 105 107   CO2 mmol/L 29.0 27.0 26.2 26.6   BUN mg/dL 18 16 18 15   CREATININE mg/dL 0.39* 0.34* 0.39* 0.46*   GLUCOSE mg/dL 108* 114* 100* 100*   EGFR mL/min/1.73 111.1 115.8 111.1 105.7     Results from last 7 days   Lab Units 08/25/24  0656 08/24/24  0308 08/22/24 2323 08/22/24  0611   ALBUMIN g/dL 2.5* 2.2* 2.3* 2.2*   BILIRUBIN mg/dL 0.2 <0.2 0.2 <0.2   ALK PHOS U/L 102 91 103 85   AST (SGOT) U/L 27 34 68* 52*   ALT (SGPT) U/L 36 45* 67* 52*     Results from last 7 days   Lab Units 08/25/24  0656 08/24/24  0308 08/22/24 2323 08/22/24  0611   CALCIUM mg/dL 8.6 8.6 8.4* 8.2*   ALBUMIN g/dL 2.5* 2.2* 2.3* 2.2*   MAGNESIUM mg/dL 2.0 2.0 2.0  --    PHOSPHORUS mg/dL 2.7 2.6 2.4*  --        Glucose   Date/Time Value Ref Range Status   08/25/2024 0555 245 (H) 70 - 130 mg/dL Final   08/25/2024 0036 95 70 - 130 mg/dL Final   08/24/2024 1736 113 70 - 130 mg/dL Final   08/24/2024 0559 113 70 -  130 mg/dL Final   08/23/2024 2032 119 70 - 130 mg/dL Final   08/23/2024 1259 122 70 - 130 mg/dL Final   08/23/2024 0638 106 70 - 130 mg/dL Final       No radiology results for the last day    I have personally reviewed all medications:  Scheduled Medications  amoxicillin-clavulanate, 1 tablet, Oral, Q12H  atorvastatin, 40 mg, Oral, Daily  lansoprazole, 30 mg, Per G Tube, Q AM  levothyroxine, 25 mcg, Oral, Q AM  methotrexate, 2.5 mg, Oral, Once per day on Wednesday Thursday  metoprolol succinate XL, 25 mg, Oral, Q24H  senna-docusate sodium, 2 tablet, Oral, BID   And  polyethylene glycol, 17 g, Oral, BID  [Held by provider] potassium chloride, 20 mEq, Oral, Daily  sodium chloride, 10 mL, Intravenous, Q12H  terazosin, 1 mg, Per G Tube, Nightly    Infusions  lactated ringers, 9 mL/hr, Last Rate: 9 mL/hr (08/20/24 1148)    Diet  Diet: Regular/House; Texture: Pureed (NDD 1); Fluid Consistency: Honey Thick    I have personally reviewed:  [x]  Laboratory   [x]  Microbiology   []  Radiology   [x]  EKG/Telemetry  [x]  Cardiology/Vascular   []  Pathology    []  Records       Assessment/Plan     Active Hospital Problems    Diagnosis  POA    **Acute on chronic anemia [D64.9]  Yes    Hypothyroidism (acquired) [E03.9]  Yes    Elevated liver function tests [R79.89]  Yes    Indwelling Forde catheter present [Z97.8]  Not Applicable    Thyroid function test abnormal [R94.6]  Yes    Dysphagia [R13.10]  Yes    Malnutrition [E46]  Yes    Feeding by G-tube [Z93.1]  Not Applicable    Hyperlipidemia [E78.5]  Yes    Sacral wound [S31.000A]  Unknown    UTI (urinary tract infection) [N39.0]  Yes    Enlarged prostate [N40.0]  Yes    Essential hypertension [I10]  Yes    Ankylosing spondylitis [M45.9]  Yes      Resolved Hospital Problems   No resolved problems to display.       80yo gentleman with ankylosing spondylitis, HTN, BPH, chronic forde, GERD, HLD, [place], and O/P dysphagia chronically on tube feeds, who was sent to ER from facility for Hgb  of 6.7.    Acute on chronic iron deficiency anemia  Anemia of chronic disease.  Anemia workup suggests chronic disease. Currently on a schedule of Procrit injections as outpt through Hem/Onc. GI has signed off as stool heme neg and family not interested in pursuing endoscopy. Heme/Onc following. Hgb improved/stable after 2 units PRBCs on 8/22. Continue to monitor.    Elevated liver function test.  Numbers normalized today. Viral hepatitis panel is negative. Okay to continue statin for now.    Hypertension.  BPs acceptable if a bit soft. On no medications. Continue to monitor.    Right infectious conjunctivitis.  Improved after 7d course of Cipro gtts.    ESBL E.coli UTI in a patient with chronic indwelling Mariscal catheter.  Has completed course of Invanz. Blood cultures negative. CT A/P showed no complicating urinary features.    Constipation.  Continue Senna-S BID and MiraLAX daily. Moving bowels now. CT 8/19 did suggest proctitis/stool impaction and we will monitor bowel movements closely moving forward.    Ankylosing spondylitis.  Continue methotrexate.    Hypothyroidism.  Newly diagnosed. Initiated low-dose Synthroid, monitor as an outpatient with repeat TFTs in 4 to 6 weeks.     Dysphagia  GERD  Malnutrition.  SLP recommends puréed and honey thickened liquids. Pt not interested in this diet but will drink thickened liquids. Continue G-tube feeds for nutritional support. Continue PPI.    Dyslipidemia.  Continue Lipitor.     Bilateral lower extremity wounds  Sacral and ischial wounds  Left calcaneal osteomyelitis.  Underwent debridement of the sacral/ischial wounds and left heel wound in the operating room on 8/20 by Dr. Roque and Dr. Duval respectively. Continue wound care per Gen Surg recs. Wound vacs placed to heel and sacrum now. Pod recommending palliative tx of what appears to be calcaneal osteomyelitis. Asked ID to weigh in regarding possible chronic therapy, they recommend only short course of treatment  as longer courses would not be successful as long as wounds are uncovered. Has completed course of Invanz for UTI and started 5d of Augmentin now for wounds per ID recs. Operative culture now with light growth of Pseudomonas. Have notified ID--await their recs today.    Non-sustained VTach.  21 beat run of asymptomatic VTach the other night. Has not recurred. Lytes fine. Card was consulted. They recommend no further tx or w/u, but did want to check Echo as he has a heart murmur--Echo unremarkable.      SCDs for DVT prophylaxis.  Full code.  Discussed with patient and RN.  Anticipate discharge  back to SNF,  timing yet to be determined.  Expected Discharge Date: 8/27/2024; Expected Discharge Time:       Walter Mancia MD  [facility]  08/25/24  10:28 EDT

## 2024-08-26 LAB
ALBUMIN SERPL-MCNC: 2.4 G/DL (ref 3.5–5.2)
ALBUMIN/GLOB SERPL: 0.5 G/DL
ALP SERPL-CCNC: 89 U/L (ref 39–117)
ALT SERPL W P-5'-P-CCNC: 30 U/L (ref 1–41)
ANION GAP SERPL CALCULATED.3IONS-SCNC: 6.7 MMOL/L (ref 5–15)
AST SERPL-CCNC: 22 U/L (ref 1–40)
BASOPHILS # BLD AUTO: 0.05 10*3/MM3 (ref 0–0.2)
BASOPHILS NFR BLD AUTO: 0.5 % (ref 0–1.5)
BILIRUB SERPL-MCNC: 0.3 MG/DL (ref 0–1.2)
BUN SERPL-MCNC: 20 MG/DL (ref 8–23)
BUN/CREAT SERPL: 41.7 (ref 7–25)
CALCIUM SPEC-SCNC: 8.6 MG/DL (ref 8.6–10.5)
CHLORIDE SERPL-SCNC: 105 MMOL/L (ref 98–107)
CO2 SERPL-SCNC: 28.3 MMOL/L (ref 22–29)
CREAT SERPL-MCNC: 0.48 MG/DL (ref 0.76–1.27)
DEPRECATED RDW RBC AUTO: 48.9 FL (ref 37–54)
EGFRCR SERPLBLD CKD-EPI 2021: 104.4 ML/MIN/1.73
EOSINOPHIL # BLD AUTO: 0.34 10*3/MM3 (ref 0–0.4)
EOSINOPHIL NFR BLD AUTO: 3.4 % (ref 0.3–6.2)
ERYTHROCYTE [DISTWIDTH] IN BLOOD BY AUTOMATED COUNT: 15.5 % (ref 12.3–15.4)
GLOBULIN UR ELPH-MCNC: 5.2 GM/DL
GLUCOSE BLDC GLUCOMTR-MCNC: 100 MG/DL (ref 70–130)
GLUCOSE BLDC GLUCOMTR-MCNC: 115 MG/DL (ref 70–130)
GLUCOSE BLDC GLUCOMTR-MCNC: 124 MG/DL (ref 70–130)
GLUCOSE BLDC GLUCOMTR-MCNC: 128 MG/DL (ref 70–130)
GLUCOSE SERPL-MCNC: 120 MG/DL (ref 65–99)
HCT VFR BLD AUTO: 32.2 % (ref 37.5–51)
HGB BLD-MCNC: 10.1 G/DL (ref 13–17.7)
IMM GRANULOCYTES # BLD AUTO: 0.05 10*3/MM3 (ref 0–0.05)
IMM GRANULOCYTES NFR BLD AUTO: 0.5 % (ref 0–0.5)
LYMPHOCYTES # BLD AUTO: 1.5 10*3/MM3 (ref 0.7–3.1)
LYMPHOCYTES NFR BLD AUTO: 14.8 % (ref 19.6–45.3)
MAGNESIUM SERPL-MCNC: 2 MG/DL (ref 1.6–2.4)
MCH RBC QN AUTO: 27.3 PG (ref 26.6–33)
MCHC RBC AUTO-ENTMCNC: 31.4 G/DL (ref 31.5–35.7)
MCV RBC AUTO: 87 FL (ref 79–97)
MONOCYTES # BLD AUTO: 1.1 10*3/MM3 (ref 0.1–0.9)
MONOCYTES NFR BLD AUTO: 10.9 % (ref 5–12)
NEUTROPHILS NFR BLD AUTO: 69.9 % (ref 42.7–76)
NEUTROPHILS NFR BLD AUTO: 7.09 10*3/MM3 (ref 1.7–7)
NRBC BLD AUTO-RTO: 0 /100 WBC (ref 0–0.2)
PHOSPHATE SERPL-MCNC: 3.1 MG/DL (ref 2.5–4.5)
PLATELET # BLD AUTO: 390 10*3/MM3 (ref 140–450)
PMV BLD AUTO: 8.6 FL (ref 6–12)
POTASSIUM SERPL-SCNC: 3.7 MMOL/L (ref 3.5–5.2)
PROT SERPL-MCNC: 7.6 G/DL (ref 6–8.5)
RBC # BLD AUTO: 3.7 10*6/MM3 (ref 4.14–5.8)
SODIUM SERPL-SCNC: 140 MMOL/L (ref 136–145)
WBC NRBC COR # BLD AUTO: 10.13 10*3/MM3 (ref 3.4–10.8)

## 2024-08-26 PROCEDURE — 82948 REAGENT STRIP/BLOOD GLUCOSE: CPT

## 2024-08-26 PROCEDURE — 85025 COMPLETE CBC W/AUTO DIFF WBC: CPT | Performed by: SURGERY

## 2024-08-26 PROCEDURE — 99232 SBSQ HOSP IP/OBS MODERATE 35: CPT | Performed by: INTERNAL MEDICINE

## 2024-08-26 PROCEDURE — 84100 ASSAY OF PHOSPHORUS: CPT | Performed by: HOSPITALIST

## 2024-08-26 PROCEDURE — 80053 COMPREHEN METABOLIC PANEL: CPT | Performed by: SURGERY

## 2024-08-26 PROCEDURE — 99232 SBSQ HOSP IP/OBS MODERATE 35: CPT | Performed by: STUDENT IN AN ORGANIZED HEALTH CARE EDUCATION/TRAINING PROGRAM

## 2024-08-26 PROCEDURE — 83735 ASSAY OF MAGNESIUM: CPT | Performed by: HOSPITALIST

## 2024-08-26 RX ORDER — CIPROFLOXACIN 500 MG/1
500 TABLET, FILM COATED ORAL EVERY 12 HOURS SCHEDULED
Status: DISCONTINUED | OUTPATIENT
Start: 2024-08-26 | End: 2024-08-28 | Stop reason: HOSPADM

## 2024-08-26 RX ADMIN — Medication 10 ML: at 21:46

## 2024-08-26 RX ADMIN — AMOXICILLIN AND CLAVULANATE POTASSIUM 1 TABLET: 875; 125 TABLET, FILM COATED ORAL at 21:46

## 2024-08-26 RX ADMIN — CIPROFLOXACIN HYDROCHLORIDE 500 MG: 500 TABLET, FILM COATED ORAL at 21:46

## 2024-08-26 RX ADMIN — LEVOTHYROXINE SODIUM 25 MCG: 25 TABLET ORAL at 05:32

## 2024-08-26 RX ADMIN — AMOXICILLIN AND CLAVULANATE POTASSIUM 1 TABLET: 875; 125 TABLET, FILM COATED ORAL at 08:04

## 2024-08-26 RX ADMIN — ATORVASTATIN CALCIUM 40 MG: 20 TABLET, FILM COATED ORAL at 08:05

## 2024-08-26 RX ADMIN — LANSOPRAZOLE 30 MG: 15 TABLET, ORALLY DISINTEGRATING ORAL at 05:32

## 2024-08-26 RX ADMIN — CIPROFLOXACIN HYDROCHLORIDE 500 MG: 500 TABLET, FILM COATED ORAL at 11:21

## 2024-08-26 RX ADMIN — Medication 10 ML: at 08:04

## 2024-08-26 NOTE — PROGRESS NOTES
Nutrition Services    Patient Name:  Anthony Gallegos  YOB: 1945  MRN: [id number]  Admit Date:  8/17/2024    Nutrition Services    Patient Name: Anthony Gallegos  YOB: 1945  MRN: [id number]  Admission date: 8/17/2024    PROGRESS NOTE      Encounter Information: Checking in on TF tolerance/po intake. Wound vacs in place. Plan for palliative consult. 2 BM's overnight.       PO Diet: Diet: Regular/House; Texture: Pureed (NDD 1); Fluid Consistency: Honey Thick   PO Supplements: Erich BID  Magic Cup BID   PO Intake:  Minimal per EMR       Current nutrition support: Nutren 2.0 at 45 mL/hr. Free water flush of 30 mL q 4 hrs.   Nutrition support review: Tolerating TF at goal per EMR. MD to manage free water flushes.       Labs (reviewed below): Labs reviewed; management per MD.       GI Function:  Fecal incontinence, last BM 8/25       Nutrition Intervention Updates: Plan/Recommendations  Good po intake encouraged and will monitor  Continue Magic Cups BID (L/D) to support po intake  Gtube feeds of Nutren 2.0 at goal of 45ml/hr  Water flushes [id number] to manage  Erich 1 pkg bolus BID via gtube for wound healing  If po intake improves, consider nocturnal feeds  Will monitor GOC    RD to follow     Results from last 7 days   Lab Units 08/26/24  0547 08/25/24  0656 08/24/24  0308   SODIUM mmol/L 140 138 140   POTASSIUM mmol/L 3.7 3.8 3.8   CHLORIDE mmol/L 105 105 106   CO2 mmol/L 28.3 29.0 27.0   BUN mg/dL 20 18 16   CREATININE mg/dL 0.48* 0.39* 0.34*   CALCIUM mg/dL 8.6 8.6 8.6   BILIRUBIN mg/dL 0.3 0.2 <0.2   ALK PHOS U/L 89 102 91   ALT (SGPT) U/L 30 36 45*   AST (SGOT) U/L 22 27 34   GLUCOSE mg/dL 120* 108* 114*     Results from last 7 days   Lab Units 08/26/24  0547 08/25/24  0656 08/24/24  0308   MAGNESIUM mg/dL 2.0 2.0 2.0   PHOSPHORUS mg/dL 3.1 2.7 2.6   HEMOGLOBIN g/dL 10.1* 10.5* 9.3*   HEMATOCRIT % 32.2* 33.9* 30.2*     COVID19   Date Value Ref Range Status   03/25/2024 Not Detected Not  Detected - Ref. Range Final     Lab Results   Component Value Date    HGBA1C 5.80 (H) 01/23/2024       RD to follow up per protocol.    Electronically signed by:  Mindy Rowe  08/26/24 09:01 EDT

## 2024-08-26 NOTE — PROGRESS NOTES
LOS: 6 days     Chief Complaint: Calcaneal osteomyelitis and ESBL UTI    Interval History: Patient reports he is doing well this morning.  States he continues to improve.    Vital Signs  Temp:  [98.1 °F (36.7 °C)-98.6 °F (37 °C)] 98.6 °F (37 °C)  Heart Rate:  [73-86] 84  Resp:  [16-20] 18  BP: (111-126)/(51-64) 114/51    Physical Exam:  General: In no acute distress.  Chronically ill-appearing.  HEENT: Oropharynx clear, moist mucous membranes  Respiratory: Normal work of breathing  Skin: Sacral wound with dressing in place.  Left heel wound with dressing.  Extremities: No edema  Access: Peripheral IV    Antibiotics:  Anti-Infectives (From admission, onward)      Ordered     Dose/Rate Route Frequency Start Stop    08/23/24 0858  amoxicillin-clavulanate (AUGMENTIN) 875-125 MG per tablet 1 tablet        Ordering Provider: Saul Menchaca DO    1 tablet Oral Every 12 Hours Scheduled 08/24/24 0900 08/29/24 0859    08/19/24 1007  ertapenem (INVanz) 1,000 mg in sodium chloride 0.9 % 100 mL MBP        Ordering Provider: Saul Menchaca DO    1,000 mg  200 mL/hr over 30 Minutes Intravenous Every 24 Hours 08/19/24 1100 08/23/24 1305             Results Review:     I reviewed the patient's new clinical results.    Lab Results   Component Value Date    WBC 10.13 08/26/2024    HGB 10.1 (L) 08/26/2024    HCT 32.2 (L) 08/26/2024    MCV 87.0 08/26/2024     08/26/2024     Lab Results   Component Value Date    GLUCOSE 120 (H) 08/26/2024    BUN 20 08/26/2024    CREATININE 0.48 (L) 08/26/2024    EGFRIFNONA 69 03/27/2018    EGFRIFAFRI 84 03/27/2018    BCR 41.7 (H) 08/26/2024    CO2 28.3 08/26/2024    CALCIUM 8.6 08/26/2024    PROTENTOTREF 8.2 08/12/2024    ALBUMIN 2.4 (L) 08/26/2024    LABIL2 0.4 (L) 08/12/2024    AST 22 08/26/2024    ALT 30 08/26/2024       Microbiology:  8/17 urine culture ESBL E. coli  8/17 blood cultures no growth  8/20 sacral wound culture E. coli, Proteus mirabilis, Pseudomonas  8/20 left  heel culture E. coli, Proteus mirabilis, Pseudomonas    Assessment    #Left heel osteomyelitis  #Sacral decubitus wound  #BPH with chronic Mariscal catheter  #ESBL E. coli UTI  #Ankylosing spondylitis  #Malnutrition and dysphagia status post G-tube    At this point patient is clinically improved with prior ertapenem therapy and Augmentin.  Plan to continue Augmentin 875 twice daily through end date 8/29.  Will add ciprofloxacin for 5 days to cover Pseudomonas with end date 8/30.  EKG reviewed today with normal QTc.    Likely no benefit at this time for definitive therapy of osteomyelitis with long-term antibiotics given remaining open wounds.  Would only be considered if patient had appropriate skin coverage of the involved areas.    Thank you for allowing me to be involved in the care of this patient. Infectious diseases will sign off at this time with antibiotics plan in place, but please call me at [phone] if any further ID questions or new ID concerns.

## 2024-08-26 NOTE — PLAN OF CARE
Goal Outcome Evaluation:              Outcome Evaluation: (P) Alert but disoriented to situation and time. NSR. Q2 turns c 2x assist, remains stiff. Oral abx given. TFs running and meds given via PEG tube. BG monitored. F/C care completed. Wound vac changed by wound, opticell placed to R gluteal by wound. Palliative and hospice met with patient, intend to follow. VSS.

## 2024-08-26 NOTE — CONSULTS
[facility] Visit Report    Anthony Gallegos  [id number]  8/26/2024    [facility] consult received and records reviewed with [facility] medical officer Dr Cha Harris. Patient is medically eligible for routine services at discharge upon return to [facility] or other LT facility.     Met with patient with his daughter Whit Dove at bedside . Detailed explanation of services for routine care in LTC facility. Discussed patient's wounds and current use of wound vac and explained that wound vacs are not covered with Hospice care and that theses would need to be discontinued if patient wishes to move forward with [facility] services, patient and family verbalized understanding that wound vacs are not covered with Hospice care.    Patient/family wishes are for patient to return to [facility] facility with [facility] services to follow.    [facility] will continue to follow up for discharge plan. Please call with any updates or change in care needs.    Thank you for allowing [facility] to participate with this patient's and family care needs.    Gail Genao RN   [facility] Admissions Coordinator  [phone]

## 2024-08-26 NOTE — NURSING NOTE
CWOCN- follow up for VAC change. Patient tolerated it ok. Changed the coccyx, ischial, and left heel dressings. Noted that there is additional breakdown to the right gluteal that is maroon/purple. I notified patient, daughter, RN, and Dr Roque. We covered the maroon/purple tissue with opticel ag. Patient is having loose bm that was starting to get under the VAC seal. In the long run, if it is within patient's goals, he may need a colostomy to prevent contamination of the wound. Currently patient/family is considering patient's goals of care and have spoke with the Palliative care team. Await plan of care. With current course, will change dressings on Wed but will monitor chart.     If stool migrates under the VAC dressing, RN staff can d/c the VAC and place and NS or dakins wet to dry dressing, changing it BID and when contaminated.     Patient continues on a low air loss mattress. He tolerates turns but is difficult to turn related to his body being very stiff.     Will monitor plan.        08/26/24 1602   Wound 08/20/24 1259 Left posterior heel Incision   Placement Date/Time: 08/20/24 1259   Side: Left  Orientation: posterior  Location: heel  Primary Wound Type: Incision   Pressure Injury Stage 4   Dressing Appearance intact   Base clean;moist;red   Periwound redness   Periwound Temperature warm   Periwound Skin Turgor soft   Edges open   Drainage Characteristics/Odor serosanguineous   Drainage Amount small   Care, Wound cleansed with;sterile normal saline;negative pressure wound therapy   Dressing Care dressing changed   Periwound Care barrier film applied   Wound 08/20/24 1259 coccyx Incision   Placement Date/Time: 08/20/24 1259   Location: coccyx  Primary Wound Type: Incision   Pressure Injury Stage 4   Dressing Appearance moist drainage   Base moist;red   Periwound redness;moist  (maroon/purple)   Periwound Temperature warm   Periwound Skin Turgor soft   Edges open   Drainage Characteristics/Odor  serosanguineous   Drainage Amount small   Care, Wound cleansed with;sterile normal saline;negative pressure wound therapy   Dressing Care dressing changed   Periwound Care barrier film applied   Wound 08/20/24 1259 Left gluteal Incision   Placement Date/Time: 08/20/24 1259   Side: Left  Location: gluteal  Primary Wound Type: Incision   Pressure Injury Stage U   Dressing Appearance intact   Base moist;gray;white   Periwound pink   Periwound Temperature warm   Periwound Skin Turgor soft   Edges open   Drainage Characteristics/Odor serous   Drainage Amount scant   Care, Wound cleansed with;sterile normal saline;negative pressure wound therapy  (hydrofera blue)   Dressing Care dressing changed   Periwound Care barrier film applied   NPWT (Negative Pressure Wound Therapy) 08/20/24 1245 left heel   Placement Date/Time: 08/20/24 1245   Location: left heel   Therapy Setting continuous therapy   Dressing foam, black;transparent dressing;gauze, nonadherent   Contact Layer silicone   Pressure Setting 125 mmHg   Sponges Inserted 1   Sponges Removed 1   Finger sweep complete Yes   NPWT (Negative Pressure Wound Therapy) 08/22/24 1618 coccyx, left ischium   Placement Date/Time: 08/22/24 1618   Location: coccyx, left ischium   Therapy Setting continuous therapy   Dressing foam, black;transparent dressing   Pressure Setting 125 mmHg   Sponges Inserted   (1 piece to coccyx, 1 piece with 1 piece hydrofera blue to ischium)   Sponges Removed   (1 piece from coccyx, 1 piece + 1 hydrofera blue from ischium)   Finger sweep complete Yes

## 2024-08-26 NOTE — PLAN OF CARE
Goal Outcome Evaluation:  Plan of Care Reviewed With: patient        Progress: declining   VSS. Pt very quiet-not talking much. Palliative consult in for AM. Ox2. Turned q2h. Tfs going. FC care done. Wound vacs in place. WOCN to replace coccyx wound vac dressing today d/t stool in it. Pt has had 2 med-large Bms overnight. No c/o pain. DC TBD.

## 2024-08-26 NOTE — PROGRESS NOTES
[facility] INPATIENT PROGRESS NOTE    Length of Stay:  6 days    CHIEF COMPLAINT/REASON FOR VISIT:  Anemia     SUBJECTIVE:  Afebrile, normotensive to mildly hypotensive. On room air.  Denies pain    ROS:  14 systems reviewed with pertinent positives and negatives in the HPI. Reviewed today.    OBJECTIVE:  Vitals:    08/25/24 1930 08/25/24 2325 08/26/24 0446 08/26/24 0730   BP: 122/61 111/64  114/51   BP Location: Left arm Left arm  Left arm   Patient Position: Lying Lying  Lying   Pulse: 79 86  84   Resp: 16 16  18   Temp: 98.6 °F (37 °C) 98.6 °F (37 °C)  98.6 °F (37 °C)   TempSrc: Oral Oral  Oral   SpO2:  100%  100%   Weight:   71.1 kg (156 lb 12 oz)    Height:             PHYSICAL EXAMINATION:   General:  No acute distress, awake, alert and oriented. Chronically ill appearing  Skin:  Warm and dry, no visible rash  HEENT:  Normocephalic/atraumatic. Wearing sunglases  Chest:  Normal respiratory effort  Extremities:  No visible clubbing, cyanosis, or edema  Neuro/psych:  Grossly nonfocal.  Normal mood and affect.       DIAGNOSTIC DATA:  Results Review:     I reviewed the patient's new clinical results.    Results from last 7 days   Lab Units 08/26/24  0547   WBC 10*3/mm3 10.13   HEMOGLOBIN g/dL 10.1*   HEMATOCRIT % 32.2*   PLATELETS 10*3/mm3 390     Lab Results   Component Value Date    NEUTROABS 7.09 (H) 08/26/2024     Results from last 7 days   Lab Units 08/26/24  0547   SODIUM mmol/L 140   POTASSIUM mmol/L 3.7   CHLORIDE mmol/L 105   CO2 mmol/L 28.3   BUN mg/dL 20   CREATININE mg/dL 0.48*   GLUCOSE mg/dL 120*   CALCIUM mg/dL 8.6         Results from last 7 days   Lab Units 08/26/24  0547   MAGNESIUM mg/dL 2.0         IMAGING:    None erviewed    ASSESSMENT:  This is a 79 y.o. male with:     *Normocytic anemia (reason for admission)  Transferred from his nursing home for a hemoglobin of 6.7  Ferritin 869, iron 14, vitamin B12 914, folic acid greater than 20, haptoglobin 361  He was seen by Dr. Quarles  "for initial consultation in the office on 8/12/2024.  Ferritin was high, iron low.  His plan was to initiate Procrit every couple of weeks and he has a follow-up appointment scheduled next month.    GI following with consideration of endoscopy  Ferritin 869, 8% saturation.  With the elevated ferritin, IV iron was not given.  Seen by Dr. Quarles on 8/12/2024.  He planned Procrit 20,000 units every 2 weeks for anemia of chronic disease and arranged appointments in the office.  8/19/2024: Procrit 20,000 units for anemia of chronic disease as per Dr. Quarles, his outpatient hematologist  Hgb 10.1, from 10.5 on 8/25/2024, from 9.3 on 8/24/2024, from 9.4 on 8/23/2024, from 6.5 on 8/22/2024 (transfusing 2 units), from 7.4,      *Ankylosing spondylitis  On methotrexate which can certainly be contributing to anemia     *Elevated TSH  Now on levothyroxine for hypothyroidism      *PEG tube in place for nutrition     *Elevated IgG level on serum immunofixation but no M spike.  Elevated light chains with a ratio is nearly normal.  Therefore, no evidence for monoclonal gammopathy.     *Forde associated UTI in the setting of history of urine outflow obstruction requiring chronic forde. On antibiotics through hospitalist service.     *Sacral and ischial pressure wounds.  Surgical debridement occurred 8/20/2024.     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 Duval, podiatry stated \"likely osteomyelitis of the calcaneus.  Do not believe MRI is required given the OR findings, depth of ulcer, and culture present.  Believe palliative care of the osteomyelitis likely presents would be the best option for this patient.\"  ID plans antibiotics through 8/29/2024.  They state no benefit for definitive therapy of osteomyelitis with long-term antibiotics due to remaining open wounds.  ID states would only consider if patient had appropriate skin coverage of the involved areas.    RECOMMENDATIONS:  Procrit not needed today with hgb 10.1  Transfuse " for hgb <7, not required  Abx per ID through 8/29  Palliative care now following, discussion of hospice  Has an appointment with Dr. Quarles on 9/11 which he can keep if desired. Will sign off. Please call us back as appropriate.     Haider Mckeon MD

## 2024-08-26 NOTE — PROGRESS NOTES
"    Name: Anthony Gallegos ADMIT: 2024   : 1945  PCP: Keshav Eason MD    MRN: [id number] LOS: 6 days   AGE/SEX: 79 y.o. male  ROOM: [facility]     Subjective   Subjective   No changes today. Feeling \"fine\" again today. Denies any N/V/D/abd pain. Tolerating po--just drinking liquids as he does not like the pureed diet. No F/C/NS. No SOA or CP. Making urine.        Objective   Objective   Vital Signs  Temp:  [98.6 °F (37 °C)] 98.6 °F (37 °C)  Heart Rate:  [79-86] 84  Resp:  [16-18] 18  BP: (111-122)/(51-64) 114/51  SpO2:  [100 %] 100 %  on   ;   Device (Oxygen Therapy): room air  Body mass index is 19.59 kg/m².    (No change in exam today)    Physical Exam  Vitals and nursing note reviewed.   Constitutional:       General: He is not in acute distress.     Appearance: He is ill-appearing (chronically). He is not toxic-appearing or diaphoretic.   HENT:      Head: Normocephalic.      Nose: Nose normal.      Mouth/Throat:      Mouth: Mucous membranes are moist.      Pharynx: Oropharynx is clear.      Comments: Voice hoarse  Eyes:      General: No scleral icterus.        Right eye: No discharge.         Left eye: No discharge.      Extraocular Movements: Extraocular movements intact.      Conjunctiva/sclera: Conjunctivae normal.   Cardiovascular:      Rate and Rhythm: Normal rate and regular rhythm.      Pulses: Normal pulses.   Pulmonary:      Effort: Pulmonary effort is normal. No respiratory distress.      Breath sounds: Normal breath sounds. No wheezing or rales.      Comments: Anteriorly   Abdominal:      General: Bowel sounds are normal. There is no distension.      Palpations: Abdomen is soft.      Tenderness: There is no abdominal tenderness.      Comments: PEG site looks good   Genitourinary:     Comments: Clear yellow urine in bag  Musculoskeletal:         General: No swelling.      Cervical back: Neck supple.      Comments: Heel boots in place  Dressing to left foot with wound vac   Skin:     General: " Skin is warm and dry.      Capillary Refill: Capillary refill takes 2 to 3 seconds.      Coloration: Skin is not jaundiced.   Neurological:      General: No focal deficit present.      Mental Status: He is alert and oriented to person, place, and time. Mental status is at baseline.      Cranial Nerves: No cranial nerve deficit.      Coordination: Coordination normal.   Psychiatric:         Mood and Affect: Mood normal.         Behavior: Behavior normal.         Thought Content: Thought content normal.       Results Review     I reviewed the patient's new clinical results.  Results from last 7 days   Lab Units 08/26/24  0547 08/25/24  0656 08/24/24  0308 08/23/24  0234   WBC 10*3/mm3 10.13 8.32 6.95 7.97   HEMOGLOBIN g/dL 10.1* 10.5* 9.3* 9.4*   PLATELETS 10*3/mm3 390 415 406 436     Results from last 7 days   Lab Units 08/26/24  0547 08/25/24  0656 08/24/24  0308 08/22/24  2323   SODIUM mmol/L 140 138 140 137   POTASSIUM mmol/L 3.7 3.8 3.8 3.8   CHLORIDE mmol/L 105 105 106 105   CO2 mmol/L 28.3 29.0 27.0 26.2   BUN mg/dL 20 18 16 18   CREATININE mg/dL 0.48* 0.39* 0.34* 0.39*   GLUCOSE mg/dL 120* 108* 114* 100*   EGFR mL/min/1.73 104.4 111.1 115.8 111.1     Results from last 7 days   Lab Units 08/26/24  0547 08/25/24  0656 08/24/24  0308 08/22/24  2323   ALBUMIN g/dL 2.4* 2.5* 2.2* 2.3*   BILIRUBIN mg/dL 0.3 0.2 <0.2 0.2   ALK PHOS U/L 89 102 91 103   AST (SGOT) U/L 22 27 34 68*   ALT (SGPT) U/L 30 36 45* 67*     Results from last 7 days   Lab Units 08/26/24  0547 08/25/24  0656 08/24/24  0308 08/22/24  2323   CALCIUM mg/dL 8.6 8.6 8.6 8.4*   ALBUMIN g/dL 2.4* 2.5* 2.2* 2.3*   MAGNESIUM mg/dL 2.0 2.0 2.0 2.0   PHOSPHORUS mg/dL 3.1 2.7 2.6 2.4*       Glucose   Date/Time Value Ref Range Status   08/26/2024 1152 115 70 - 130 mg/dL Final   08/26/2024 0604 124 70 - 130 mg/dL Final   08/26/2024 0043 128 70 - 130 mg/dL Final   08/25/2024 1756 122 70 - 130 mg/dL Final   08/25/2024 1207 111 70 - 130 mg/dL Final   08/25/2024  0555 245 (H) 70 - 130 mg/dL Final   08/25/2024 0036 95 70 - 130 mg/dL Final       No radiology results for the last day    I have personally reviewed all medications:  Scheduled Medications  amoxicillin-clavulanate, 1 tablet, Oral, Q12H  atorvastatin, 40 mg, Oral, Daily  ciprofloxacin, 500 mg, Oral, Q12H  lansoprazole, 30 mg, Per G Tube, Q AM  levothyroxine, 25 mcg, Oral, Q AM  methotrexate, 2.5 mg, Oral, Once per day on Wednesday Thursday  metoprolol succinate XL, 25 mg, Oral, Q24H  senna-docusate sodium, 2 tablet, Oral, BID   And  polyethylene glycol, 17 g, Oral, BID  [Held by provider] potassium chloride, 20 mEq, Oral, Daily  sodium chloride, 10 mL, Intravenous, Q12H  terazosin, 1 mg, Per G Tube, Nightly    Infusions  lactated ringers, 9 mL/hr, Last Rate: 9 mL/hr (08/20/24 1148)    Diet  Diet: Regular/House; Texture: Pureed (NDD 1); Fluid Consistency: Honey Thick    I have personally reviewed:  [x]  Laboratory   [x]  Microbiology   []  Radiology   [x]  EKG/Telemetry  []  Cardiology/Vascular   []  Pathology    []  Records       Assessment/Plan     Active Hospital Problems    Diagnosis  POA    **Acute on chronic anemia [D64.9]  Yes    Hypothyroidism (acquired) [E03.9]  Yes    Elevated liver function tests [R79.89]  Yes    Indwelling Forde catheter present [Z97.8]  Not Applicable    Thyroid function test abnormal [R94.6]  Yes    Dysphagia [R13.10]  Yes    Malnutrition [E46]  Yes    Feeding by G-tube [Z93.1]  Not Applicable    Hyperlipidemia [E78.5]  Yes    Sacral wound [S31.000A]  Unknown    UTI (urinary tract infection) [N39.0]  Yes    Enlarged prostate [N40.0]  Yes    Essential hypertension [I10]  Yes    Ankylosing spondylitis [M45.9]  Yes      Resolved Hospital Problems   No resolved problems to display.       80yo gentleman with ankylosing spondylitis, HTN, BPH, chronic forde, GERD, HLD, [place], and O/P dysphagia chronically on tube feeds, who was sent to ER from facility for Hgb of 6.7.    Acute on chronic iron  deficiency anemia  Anemia of chronic disease.  Anemia workup suggests chronic disease. Currently on a schedule of Procrit injections as outpt through Hem/Onc. GI has signed off as stool heme neg and family not interested in pursuing endoscopy. Heme/Onc following. Hgb improved/stable after 2 units PRBCs on 8/22. Continue to monitor.    Elevated liver function test.  Numbers normalized. Viral hepatitis panel is negative. Okay to continue statin for now.    Hypertension.  BPs acceptable. On no medications. Continue to monitor.    Right infectious conjunctivitis.  Improved after 7d course of Cipro gtts.    ESBL E.coli UTI in a patient with chronic indwelling Mariscal catheter.  Has completed course of Invanz. Blood cultures negative. CT A/P showed no complicating urinary features.    Constipation.  Continue Senna-S BID and MiraLAX daily. Moving bowels now. CT 8/19 did suggest proctitis/stool impaction and we will monitor bowel movements closely moving forward.    Ankylosing spondylitis.  Continue methotrexate.    Hypothyroidism.  Newly diagnosed. Initiated low-dose Synthroid, monitor as an outpatient with repeat TFTs in 4 to 6 weeks.     Dysphagia  GERD  Malnutrition.  SLP recommends puréed and honey thickened liquids. Pt not interested in this diet but will drink thickened liquids. Continue G-tube feeds for nutritional support. Continue PPI.    Dyslipidemia.  Continue Lipitor.     Bilateral lower extremity wounds  Sacral and ischial wounds  Left calcaneal osteomyelitis.  Underwent debridement of the sacral/ischial wounds and left heel wound in the operating room on 8/20 by Dr. Roque and Dr. Duval respectively. Continue wound care per Gen Surg recs. Wound vacs placed to heel and sacrum now. Pod recommending palliative tx of what appears to be calcaneal osteomyelitis. Asked ID to weigh in regarding possible chronic therapy, they recommend only short course of treatment as longer courses would not be successful as long as  wounds are uncovered. Has completed course of Invanz for UTI and started 5d of Augmentin now for wounds per ID recs. Operative culture now with light growth of Pseudomonas--notified ID and they have added 5d course of Cipro.    Non-sustained VTach.  21 beat run of asymptomatic VTach the other night. Has not recurred. Lytes fine. Card was consulted. They recommend no further tx or w/u, but did want to check Echo as he has a heart murmur--Echo unremarkable.      SCDs for DVT prophylaxis.  Full code. Pt's dtr is going to discuss code status with him today.  Discussed with patient and RN. D/w Palliative. Hospice consulted at dtr's request today.  Anticipate discharge  back to SNF,  timing yet to be determined.  Expected Discharge Date: 8/28/2024; Expected Discharge Time:       Walter Mancia MD  [facility]  08/26/24  13:19 EDT

## 2024-08-27 LAB
ALBUMIN SERPL-MCNC: 2 G/DL (ref 3.5–5.2)
ALBUMIN/GLOB SERPL: 0.4 G/DL
ALP SERPL-CCNC: 83 U/L (ref 39–117)
ALT SERPL W P-5'-P-CCNC: 23 U/L (ref 1–41)
ANION GAP SERPL CALCULATED.3IONS-SCNC: 4.6 MMOL/L (ref 5–15)
AST SERPL-CCNC: 23 U/L (ref 1–40)
BASOPHILS # BLD AUTO: 0.04 10*3/MM3 (ref 0–0.2)
BASOPHILS NFR BLD AUTO: 0.5 % (ref 0–1.5)
BILIRUB SERPL-MCNC: 0.3 MG/DL (ref 0–1.2)
BUN SERPL-MCNC: 18 MG/DL (ref 8–23)
BUN/CREAT SERPL: 40 (ref 7–25)
CALCIUM SPEC-SCNC: 8.4 MG/DL (ref 8.6–10.5)
CHLORIDE SERPL-SCNC: 108 MMOL/L (ref 98–107)
CO2 SERPL-SCNC: 27.4 MMOL/L (ref 22–29)
CREAT SERPL-MCNC: 0.45 MG/DL (ref 0.76–1.27)
DEPRECATED RDW RBC AUTO: 48.6 FL (ref 37–54)
EGFRCR SERPLBLD CKD-EPI 2021: 106.4 ML/MIN/1.73
EOSINOPHIL # BLD AUTO: 0.48 10*3/MM3 (ref 0–0.4)
EOSINOPHIL NFR BLD AUTO: 6.2 % (ref 0.3–6.2)
ERYTHROCYTE [DISTWIDTH] IN BLOOD BY AUTOMATED COUNT: 15.5 % (ref 12.3–15.4)
GLOBULIN UR ELPH-MCNC: 4.9 GM/DL
GLUCOSE BLDC GLUCOMTR-MCNC: 102 MG/DL (ref 70–130)
GLUCOSE BLDC GLUCOMTR-MCNC: 108 MG/DL (ref 70–130)
GLUCOSE BLDC GLUCOMTR-MCNC: 109 MG/DL (ref 70–130)
GLUCOSE BLDC GLUCOMTR-MCNC: 110 MG/DL (ref 70–130)
GLUCOSE SERPL-MCNC: 103 MG/DL (ref 65–99)
HCT VFR BLD AUTO: 28.7 % (ref 37.5–51)
HGB BLD-MCNC: 9 G/DL (ref 13–17.7)
IMM GRANULOCYTES # BLD AUTO: 0.03 10*3/MM3 (ref 0–0.05)
IMM GRANULOCYTES NFR BLD AUTO: 0.4 % (ref 0–0.5)
LYMPHOCYTES # BLD AUTO: 1.46 10*3/MM3 (ref 0.7–3.1)
LYMPHOCYTES NFR BLD AUTO: 18.8 % (ref 19.6–45.3)
MAGNESIUM SERPL-MCNC: 2 MG/DL (ref 1.6–2.4)
MCH RBC QN AUTO: 27.7 PG (ref 26.6–33)
MCHC RBC AUTO-ENTMCNC: 31.4 G/DL (ref 31.5–35.7)
MCV RBC AUTO: 88.3 FL (ref 79–97)
MONOCYTES # BLD AUTO: 0.93 10*3/MM3 (ref 0.1–0.9)
MONOCYTES NFR BLD AUTO: 12 % (ref 5–12)
NEUTROPHILS NFR BLD AUTO: 4.82 10*3/MM3 (ref 1.7–7)
NEUTROPHILS NFR BLD AUTO: 62.1 % (ref 42.7–76)
NRBC BLD AUTO-RTO: 0 /100 WBC (ref 0–0.2)
PHOSPHATE SERPL-MCNC: 2.9 MG/DL (ref 2.5–4.5)
PLATELET # BLD AUTO: 263 10*3/MM3 (ref 140–450)
PMV BLD AUTO: 8.8 FL (ref 6–12)
POTASSIUM SERPL-SCNC: 4 MMOL/L (ref 3.5–5.2)
PROT SERPL-MCNC: 6.9 G/DL (ref 6–8.5)
RBC # BLD AUTO: 3.25 10*6/MM3 (ref 4.14–5.8)
SODIUM SERPL-SCNC: 140 MMOL/L (ref 136–145)
WBC NRBC COR # BLD AUTO: 7.76 10*3/MM3 (ref 3.4–10.8)

## 2024-08-27 PROCEDURE — 80053 COMPREHEN METABOLIC PANEL: CPT | Performed by: SURGERY

## 2024-08-27 PROCEDURE — 83735 ASSAY OF MAGNESIUM: CPT | Performed by: HOSPITALIST

## 2024-08-27 PROCEDURE — 84100 ASSAY OF PHOSPHORUS: CPT | Performed by: HOSPITALIST

## 2024-08-27 PROCEDURE — 85025 COMPLETE CBC W/AUTO DIFF WBC: CPT | Performed by: SURGERY

## 2024-08-27 PROCEDURE — 82948 REAGENT STRIP/BLOOD GLUCOSE: CPT

## 2024-08-27 RX ADMIN — Medication 10 ML: at 21:20

## 2024-08-27 RX ADMIN — LEVOTHYROXINE SODIUM 25 MCG: 25 TABLET ORAL at 05:38

## 2024-08-27 RX ADMIN — CIPROFLOXACIN HYDROCHLORIDE 500 MG: 500 TABLET, FILM COATED ORAL at 21:20

## 2024-08-27 RX ADMIN — SENNOSIDES AND DOCUSATE SODIUM 2 TABLET: 50; 8.6 TABLET ORAL at 09:31

## 2024-08-27 RX ADMIN — LANSOPRAZOLE 30 MG: 15 TABLET, ORALLY DISINTEGRATING ORAL at 05:38

## 2024-08-27 RX ADMIN — CIPROFLOXACIN HYDROCHLORIDE 500 MG: 500 TABLET, FILM COATED ORAL at 09:31

## 2024-08-27 RX ADMIN — AMOXICILLIN AND CLAVULANATE POTASSIUM 1 TABLET: 875; 125 TABLET, FILM COATED ORAL at 09:31

## 2024-08-27 RX ADMIN — AMOXICILLIN AND CLAVULANATE POTASSIUM 1 TABLET: 875; 125 TABLET, FILM COATED ORAL at 21:20

## 2024-08-27 RX ADMIN — ATORVASTATIN CALCIUM 40 MG: 20 TABLET, FILM COATED ORAL at 09:31

## 2024-08-27 RX ADMIN — Medication 10 ML: at 09:31

## 2024-08-27 RX ADMIN — TERAZOSIN 1 MG: 1 CAPSULE ORAL at 21:20

## 2024-08-27 NOTE — PROGRESS NOTES
"    Name: Anthony Gallegos ADMIT: 2024   : 1945  PCP: Kehsav Eason MD    MRN: [id number] LOS: 7 days   AGE/SEX: 79 y.o. male  ROOM: [facility]     Subjective   Subjective   No changes today. Feeling \"fine\" again today. Denies any N/V/D/abd pain. Tolerating po--just drinking liquids as he does not like the pureed diet. No F/C/NS. No SOA or CP. Making urine.        Objective   Objective   Vital Signs  Temp:  [98.4 °F (36.9 °C)-99.5 °F (37.5 °C)] 98.8 °F (37.1 °C)  Heart Rate:  [79-82] 81  Resp:  [16-18] 16  BP: (103-111)/(48-55) 104/48  SpO2:  [92 %-97 %] 95 %  on   ;   Device (Oxygen Therapy): room air  Body mass index is 19.9 kg/m².    (No change in exam today)    Physical Exam  Vitals and nursing note reviewed.   Constitutional:       General: He is not in acute distress.     Appearance: He is ill-appearing (chronically). He is not toxic-appearing or diaphoretic.   HENT:      Head: Normocephalic.      Nose: Nose normal.      Mouth/Throat:      Mouth: Mucous membranes are moist.      Pharynx: Oropharynx is clear.      Comments: Voice hoarse  Eyes:      General: No scleral icterus.        Right eye: No discharge.         Left eye: No discharge.      Extraocular Movements: Extraocular movements intact.      Conjunctiva/sclera: Conjunctivae normal.   Cardiovascular:      Rate and Rhythm: Normal rate and regular rhythm.      Pulses: Normal pulses.   Pulmonary:      Effort: Pulmonary effort is normal. No respiratory distress.      Breath sounds: Normal breath sounds. No wheezing or rales.      Comments: Anteriorly   Abdominal:      General: Bowel sounds are normal. There is no distension.      Palpations: Abdomen is soft.      Tenderness: There is no abdominal tenderness.      Comments: PEG site looks good   Genitourinary:     Comments: Clear yellow urine in bag  Musculoskeletal:         General: No swelling.      Cervical back: Neck supple.      Comments: Heel boots in place  Dressing to left foot with wound " vac   Skin:     General: Skin is warm and dry.      Capillary Refill: Capillary refill takes less than 2 seconds.      Coloration: Skin is not jaundiced.   Neurological:      General: No focal deficit present.      Mental Status: He is alert and oriented to person, place, and time. Mental status is at baseline.      Cranial Nerves: No cranial nerve deficit.      Coordination: Coordination normal.   Psychiatric:         Mood and Affect: Mood normal.         Behavior: Behavior normal.         Thought Content: Thought content normal.       Results Review     I reviewed the patient's new clinical results.  Results from last 7 days   Lab Units 08/27/24  0620 08/26/24  0547 08/25/24  0656 08/24/24  0308   WBC 10*3/mm3 7.76 10.13 8.32 6.95   HEMOGLOBIN g/dL 9.0* 10.1* 10.5* 9.3*   PLATELETS 10*3/mm3 263 390 415 406     Results from last 7 days   Lab Units 08/27/24  0620 08/26/24  0547 08/25/24  0656 08/24/24  0308   SODIUM mmol/L 140 140 138 140   POTASSIUM mmol/L 4.0 3.7 3.8 3.8   CHLORIDE mmol/L 108* 105 105 106   CO2 mmol/L 27.4 28.3 29.0 27.0   BUN mg/dL 18 20 18 16   CREATININE mg/dL 0.45* 0.48* 0.39* 0.34*   GLUCOSE mg/dL 103* 120* 108* 114*   EGFR mL/min/1.73 106.4 104.4 111.1 115.8     Results from last 7 days   Lab Units 08/27/24  0620 08/26/24  0547 08/25/24  0656 08/24/24  0308   ALBUMIN g/dL 2.0* 2.4* 2.5* 2.2*   BILIRUBIN mg/dL 0.3 0.3 0.2 <0.2   ALK PHOS U/L 83 89 102 91   AST (SGOT) U/L 23 22 27 34   ALT (SGPT) U/L 23 30 36 45*     Results from last 7 days   Lab Units 08/27/24  0620 08/26/24  0547 08/25/24  0656 08/24/24  0308   CALCIUM mg/dL 8.4* 8.6 8.6 8.6   ALBUMIN g/dL 2.0* 2.4* 2.5* 2.2*   MAGNESIUM mg/dL 2.0 2.0 2.0 2.0   PHOSPHORUS mg/dL 2.9 3.1 2.7 2.6       Glucose   Date/Time Value Ref Range Status   08/27/2024 0627 109 70 - 130 mg/dL Final   08/27/2024 0013 108 70 - 130 mg/dL Final   08/26/2024 1808 100 70 - 130 mg/dL Final   08/26/2024 1152 115 70 - 130 mg/dL Final   08/26/2024 0604 124 70 - 130  mg/dL Final   08/26/2024 0043 128 70 - 130 mg/dL Final   08/25/2024 1756 122 70 - 130 mg/dL Final       No radiology results for the last day    I have personally reviewed all medications:  Scheduled Medications  amoxicillin-clavulanate, 1 tablet, Oral, Q12H  atorvastatin, 40 mg, Oral, Daily  ciprofloxacin, 500 mg, Oral, Q12H  lansoprazole, 30 mg, Per G Tube, Q AM  levothyroxine, 25 mcg, Oral, Q AM  methotrexate, 2.5 mg, Oral, Once per day on Wednesday Thursday  metoprolol succinate XL, 25 mg, Oral, Q24H  senna-docusate sodium, 2 tablet, Oral, BID   And  polyethylene glycol, 17 g, Oral, BID  [Held by provider] potassium chloride, 20 mEq, Oral, Daily  sodium chloride, 10 mL, Intravenous, Q12H  terazosin, 1 mg, Per G Tube, Nightly    Infusions  lactated ringers, 9 mL/hr, Last Rate: 9 mL/hr (08/20/24 1148)    Diet  Diet: Regular/House; Texture: Pureed (NDD 1); Fluid Consistency: Honey Thick    I have personally reviewed:  [x]  Laboratory   []  Microbiology   []  Radiology   [x]  EKG/Telemetry  []  Cardiology/Vascular   []  Pathology    []  Records       Assessment/Plan     Active Hospital Problems    Diagnosis  POA    **Acute on chronic anemia [D64.9]  Yes    Hypothyroidism (acquired) [E03.9]  Yes    Elevated liver function tests [R79.89]  Yes    Indwelling Forde catheter present [Z97.8]  Not Applicable    Thyroid function test abnormal [R94.6]  Yes    Dysphagia [R13.10]  Yes    Malnutrition [E46]  Yes    Feeding by G-tube [Z93.1]  Not Applicable    Hyperlipidemia [E78.5]  Yes    Sacral wound [S31.000A]  Unknown    UTI (urinary tract infection) [N39.0]  Yes    Enlarged prostate [N40.0]  Yes    Essential hypertension [I10]  Yes    Ankylosing spondylitis [M45.9]  Yes      Resolved Hospital Problems   No resolved problems to display.       80yo gentleman with ankylosing spondylitis, HTN, BPH, chronic forde, GERD, HLD, [place], and O/P dysphagia chronically on tube feeds, who was sent to ER from facility for Hgb of 6.7 and  found to have severe pressure wounds.    Acute on chronic iron deficiency anemia  Anemia of chronic disease.  Anemia workup suggests chronic disease. Currently on a schedule of Procrit injections as outpt through Hem/Onc. GI has signed off as stool heme neg and family not interested in pursuing endoscopy. Heme/Onc following. Hgb improved after 2 units PRBCs on 8/22 but down a gram overnight. Will need to monitor closely.    Elevated liver function test.  Numbers normalized. Viral hepatitis panel is negative. Okay to continue statin for now.    Hypertension.  BPs acceptable. On no medications. Continue to monitor.    Right infectious conjunctivitis.  Improved after 7d course of Cipro gtts.    ESBL E.coli UTI in a patient with chronic indwelling Mariscal catheter.  Has completed course of Invanz. Blood cultures negative. CT A/P showed no complicating urinary features.    Constipation.  Continue Senna-S BID and MiraLAX daily. Moving bowels now. CT 8/19 did suggest proctitis/stool impaction and we will monitor bowel movements closely moving forward.    Ankylosing spondylitis.  Continue methotrexate.    Hypothyroidism.  Newly diagnosed. Initiated low-dose Synthroid, monitor as an outpatient with repeat TFTs in 4 to 6 weeks.     Dysphagia  GERD  Malnutrition.  SLP recommends puréed and honey thickened liquids. Pt not interested in this diet but will drink thickened liquids. Continue G-tube feeds for nutritional support. Continue PPI.    Dyslipidemia.  Continue Lipitor.     Bilateral lower extremity wounds  Sacral and ischial wounds  Left calcaneal osteomyelitis.  Underwent debridement of the sacral/ischial wounds and left heel wound in the operating room on 8/20 by Dr. Roque and Dr. Duval respectively. Continue wound care per Gen Surg recs. Wound vacs placed to heel and sacrum now. Pod recommending palliative tx of what appears to be calcaneal osteomyelitis. Asked ID to weigh in regarding possible chronic therapy, they  recommend only short course of treatment as longer courses would not be successful as long as wounds are uncovered. Has completed course of Invanz for UTI and started 5d of Augmentin now for wounds per ID recs. Operative culture now with light growth of Pseudomonas--notified ID and they have added 5d course of Cipro. Pt's family interested in hospice care at his LTC. He would not be able to continue wound vac therapy if entered hospice care (per hospice RN).    Non-sustained VTach.  21 beat run of asymptomatic VTach the other night. Has not recurred. Lytes fine. Card was consulted. They recommend no further tx or w/u, but did want to check Echo as he has a heart murmur--Echo unremarkable.      SCDs for DVT prophylaxis.  Full code. Palliative Care has d/w dtr--she and pt wish to remain full code for now.  Discussed with patient and RN. D/w Palliative.   Anticipate discharge  back to LTC,  in next day or two if Hgb stable.  Expected Discharge Date: 8/29/2024; Expected Discharge Time:       Walter Mancia MD  [facility]  08/27/24  10:37 EDT

## 2024-08-27 NOTE — PROGRESS NOTES
Nutrition Services    Patient Name:  Anthony Gallegos  YOB: 1945  MRN: [id number]  Admit Date:  8/17/2024    Nutrition Services    Patient Name: Anthony Gallegos  YOB: 1945  MRN: [id number]  Admission date: 8/17/2024    PROGRESS NOTE      Encounter Information: Checking in on TF tolerance/po intake. Wound vacs in place. 2 smear BM's overnight. Palliative and hospice following.       PO Diet: Diet: Regular/House; Texture: Pureed (NDD 1); Fluid Consistency: Honey Thick   PO Supplements: Erich BID  Magic Cup BID   PO Intake:  Minimal per EMR - does not like pureed diet, tolerating liquids.       Current nutrition support: Nutren 2.0 at 45 mL/hr. Free water flush of 30 mL q 4 hrs.   Nutrition support review: Tolerating TF at goal per EMR. MD to manage free water flushes.       Labs (reviewed below): Labs reviewed; management per MD.       GI Function:  Fecal incontinence, last BM 8/26 - smear       Nutrition Intervention Updates: Plan/Recommendations  Good po intake encouraged and will monitor  Continue Magic Cups BID (L/D) to support po intake  Gtube feeds of Nutren 2.0 at goal of 45ml/hr  Water flushes [id number] to manage  Erich 1 pkg bolus BID via gtube for wound healing  If po intake improves, consider nocturnal feeds  Will monitor GOC    RD to follow     Results from last 7 days   Lab Units 08/27/24  0620 08/26/24  0547 08/25/24  0656   SODIUM mmol/L 140 140 138   POTASSIUM mmol/L 4.0 3.7 3.8   CHLORIDE mmol/L 108* 105 105   CO2 mmol/L 27.4 28.3 29.0   BUN mg/dL 18 20 18   CREATININE mg/dL 0.45* 0.48* 0.39*   CALCIUM mg/dL 8.4* 8.6 8.6   BILIRUBIN mg/dL 0.3 0.3 0.2   ALK PHOS U/L 83 89 102   ALT (SGPT) U/L 23 30 36   AST (SGOT) U/L 23 22 27   GLUCOSE mg/dL 103* 120* 108*     Results from last 7 days   Lab Units 08/27/24  0620 08/26/24  0547 08/25/24  0656   MAGNESIUM mg/dL 2.0 2.0 2.0   PHOSPHORUS mg/dL 2.9 3.1 2.7   HEMOGLOBIN g/dL 9.0* 10.1* 10.5*   HEMATOCRIT % 28.7* 32.2* 33.9*      COVID19   Date Value Ref Range Status   03/25/2024 Not Detected Not Detected - Ref. Range Final     Lab Results   Component Value Date    HGBA1C 5.80 (H) 01/23/2024       RD to follow up per protocol.    Electronically signed by:  Mindy Rowe  08/27/24 09:53 EDT

## 2024-08-27 NOTE — NURSING NOTE
I spoke wit this patient's daughter Whit in follow up to our [facility] discussion. Whit shared that she and the patient discussed code status and met with [facility] yesterday. At this time, the patient wishes to remain a full code but is agreeable to use of [facility] as a supportive resource upon return to LTC facility.  Whit requested that we not discuss code status any further at this point, for fear of overwhelming the patient. Whit also shared that she is in fact the patient's medical POA, documentation requested. She confirmed her understanding that [facility] will continue to provide support with discussion and decision making.     I advised her of our availability and all questions answered.    We will sign off at this time, please re consult if further support is needed.     Maryellen Lind, Palliative Care Referral RN

## 2024-08-27 NOTE — PLAN OF CARE
Goal Outcome Evaluation:              Outcome Evaluation: Pt Alert but disoriented to time and situation. Q2 turns, x2 assist. Tube feeds continued @ goal. Blood sugar monitored. Mariscal remains in place. Wound drsg changed.  Wound vacs remain in place. Palliative care came to see. Possible d/c back to facility under hospice care in 1-2 days. Recieved documentation of POA, copies in chart. Spoke with family at bedside. VSS.

## 2024-08-27 NOTE — PLAN OF CARE
Goal Outcome Evaluation:  Plan of Care Reviewed With: patient        Progress: no change     VSS. Pt turned q2h. Tfs going. 2 small smear Bms overnight. Wound vac to buttocks and L heel. No c/o pain. Palliative met with pt and family yday and they are thinking about code status and if pt will bd Dced with hospice back to NH. Possible DC 8/28 back to NH per MD notes.                              Medication teaching and assessment/Rehabilitation services

## 2024-08-28 VITALS
HEIGHT: 75 IN | HEART RATE: 79 BPM | OXYGEN SATURATION: 98 % | TEMPERATURE: 97.7 F | RESPIRATION RATE: 18 BRPM | BODY MASS INDEX: 19.87 KG/M2 | DIASTOLIC BLOOD PRESSURE: 47 MMHG | SYSTOLIC BLOOD PRESSURE: 103 MMHG | WEIGHT: 159.83 LBS

## 2024-08-28 LAB
ALBUMIN SERPL-MCNC: 2.5 G/DL (ref 3.5–5.2)
ALBUMIN/GLOB SERPL: 0.5 G/DL
ALP SERPL-CCNC: 102 U/L (ref 39–117)
ALT SERPL W P-5'-P-CCNC: 28 U/L (ref 1–41)
ANION GAP SERPL CALCULATED.3IONS-SCNC: 6 MMOL/L (ref 5–15)
AST SERPL-CCNC: 25 U/L (ref 1–40)
BASOPHILS # BLD AUTO: 0.05 10*3/MM3 (ref 0–0.2)
BASOPHILS NFR BLD AUTO: 0.7 % (ref 0–1.5)
BILIRUB SERPL-MCNC: 0.3 MG/DL (ref 0–1.2)
BUN SERPL-MCNC: 18 MG/DL (ref 8–23)
BUN/CREAT SERPL: 43.9 (ref 7–25)
CALCIUM SPEC-SCNC: 8.6 MG/DL (ref 8.6–10.5)
CHLORIDE SERPL-SCNC: 107 MMOL/L (ref 98–107)
CO2 SERPL-SCNC: 29 MMOL/L (ref 22–29)
CREAT SERPL-MCNC: 0.41 MG/DL (ref 0.76–1.27)
DEPRECATED RDW RBC AUTO: 48.5 FL (ref 37–54)
EGFRCR SERPLBLD CKD-EPI 2021: 109.5 ML/MIN/1.73
EOSINOPHIL # BLD AUTO: 0.34 10*3/MM3 (ref 0–0.4)
EOSINOPHIL NFR BLD AUTO: 4.5 % (ref 0.3–6.2)
ERYTHROCYTE [DISTWIDTH] IN BLOOD BY AUTOMATED COUNT: 15.5 % (ref 12.3–15.4)
GLOBULIN UR ELPH-MCNC: 5.1 GM/DL
GLUCOSE BLDC GLUCOMTR-MCNC: 111 MG/DL (ref 70–130)
GLUCOSE BLDC GLUCOMTR-MCNC: 117 MG/DL (ref 70–130)
GLUCOSE BLDC GLUCOMTR-MCNC: 97 MG/DL (ref 70–130)
GLUCOSE SERPL-MCNC: 100 MG/DL (ref 65–99)
HCT VFR BLD AUTO: 30.5 % (ref 37.5–51)
HGB BLD-MCNC: 9.5 G/DL (ref 13–17.7)
IMM GRANULOCYTES # BLD AUTO: 0.02 10*3/MM3 (ref 0–0.05)
IMM GRANULOCYTES NFR BLD AUTO: 0.3 % (ref 0–0.5)
LYMPHOCYTES # BLD AUTO: 2.02 10*3/MM3 (ref 0.7–3.1)
LYMPHOCYTES NFR BLD AUTO: 26.4 % (ref 19.6–45.3)
MCH RBC QN AUTO: 27.3 PG (ref 26.6–33)
MCHC RBC AUTO-ENTMCNC: 31.1 G/DL (ref 31.5–35.7)
MCV RBC AUTO: 87.6 FL (ref 79–97)
MONOCYTES # BLD AUTO: 0.78 10*3/MM3 (ref 0.1–0.9)
MONOCYTES NFR BLD AUTO: 10.2 % (ref 5–12)
NEUTROPHILS NFR BLD AUTO: 4.43 10*3/MM3 (ref 1.7–7)
NEUTROPHILS NFR BLD AUTO: 57.9 % (ref 42.7–76)
NRBC BLD AUTO-RTO: 0 /100 WBC (ref 0–0.2)
PLATELET # BLD AUTO: 338 10*3/MM3 (ref 140–450)
PMV BLD AUTO: 8.7 FL (ref 6–12)
POTASSIUM SERPL-SCNC: 3.9 MMOL/L (ref 3.5–5.2)
PROT SERPL-MCNC: 7.6 G/DL (ref 6–8.5)
RBC # BLD AUTO: 3.48 10*6/MM3 (ref 4.14–5.8)
SODIUM SERPL-SCNC: 142 MMOL/L (ref 136–145)
WBC NRBC COR # BLD AUTO: 7.64 10*3/MM3 (ref 3.4–10.8)

## 2024-08-28 PROCEDURE — 82948 REAGENT STRIP/BLOOD GLUCOSE: CPT

## 2024-08-28 PROCEDURE — 80053 COMPREHEN METABOLIC PANEL: CPT | Performed by: SURGERY

## 2024-08-28 PROCEDURE — 85025 COMPLETE CBC W/AUTO DIFF WBC: CPT | Performed by: SURGERY

## 2024-08-28 PROCEDURE — 63710000001 METHOTREXATE 2.5 MG TABLET: Performed by: SURGERY

## 2024-08-28 RX ORDER — POLYETHYLENE GLYCOL 3350 17 G/17G
17 POWDER, FOR SOLUTION ORAL 2 TIMES DAILY
Start: 2024-08-28

## 2024-08-28 RX ORDER — METOPROLOL SUCCINATE 25 MG/1
25 TABLET, EXTENDED RELEASE ORAL
Start: 2024-08-29

## 2024-08-28 RX ORDER — AMOXICILLIN 250 MG
2 CAPSULE ORAL 2 TIMES DAILY
Start: 2024-08-28

## 2024-08-28 RX ORDER — CIPROFLOXACIN 500 MG/1
500 TABLET, FILM COATED ORAL EVERY 12 HOURS SCHEDULED
Qty: 5 TABLET | Refills: 0 | Status: SHIPPED | OUTPATIENT
Start: 2024-08-28 | End: 2024-08-31

## 2024-08-28 RX ADMIN — AMOXICILLIN AND CLAVULANATE POTASSIUM 1 TABLET: 875; 125 TABLET, FILM COATED ORAL at 10:17

## 2024-08-28 RX ADMIN — POLYETHYLENE GLYCOL 3350 17 G: 17 POWDER, FOR SOLUTION ORAL at 10:18

## 2024-08-28 RX ADMIN — METOPROLOL SUCCINATE 25 MG: 25 TABLET, EXTENDED RELEASE ORAL at 10:18

## 2024-08-28 RX ADMIN — Medication 10 ML: at 10:20

## 2024-08-28 RX ADMIN — LEVOTHYROXINE SODIUM 25 MCG: 25 TABLET ORAL at 05:10

## 2024-08-28 RX ADMIN — LANSOPRAZOLE 30 MG: 15 TABLET, ORALLY DISINTEGRATING ORAL at 05:10

## 2024-08-28 RX ADMIN — ATORVASTATIN CALCIUM 40 MG: 20 TABLET, FILM COATED ORAL at 10:18

## 2024-08-28 RX ADMIN — CIPROFLOXACIN HYDROCHLORIDE 500 MG: 500 TABLET, FILM COATED ORAL at 10:18

## 2024-08-28 RX ADMIN — METHOTREXATE 2.5 MG: 2.5 TABLET ORAL at 10:20

## 2024-08-28 RX ADMIN — SENNOSIDES AND DOCUSATE SODIUM 2 TABLET: 50; 8.6 TABLET ORAL at 10:19

## 2024-08-28 NOTE — DISCHARGE SUMMARY
NAME: Anthony Gallegos ADMIT: 2024   : 1945  PCP: Keshav Eason MD    MRN: [id number] LOS: 8 days   AGE/SEX: 79 y.o. male  ROOM: [facility]     Date of Admission:  2024  Date of Discharge:  2024    PCP: Keshav Eason MD    CHIEF COMPLAINT  Abnormal Lab and Urinary Tract Infection      DISCHARGE DIAGNOSIS  Active Hospital Problems    Diagnosis  POA    **Acute on chronic anemia [D64.9]  Yes    Hypothyroidism (acquired) [E03.9]  Yes    Elevated liver function tests [R79.89]  Yes    Indwelling Forde catheter present [Z97.8]  Not Applicable    Thyroid function test abnormal [R94.6]  Yes    Dysphagia [R13.10]  Yes    Malnutrition [E46]  Yes    Feeding by G-tube [Z93.1]  Not Applicable    Hyperlipidemia [E78.5]  Yes    Sacral wound [S31.000A]  Unknown    UTI (urinary tract infection) [N39.0]  Yes    Enlarged prostate [N40.0]  Yes    Essential hypertension [I10]  Yes    Ankylosing spondylitis [M45.9]  Yes      Resolved Hospital Problems   No resolved problems to display.       SECONDARY DIAGNOSES  Past Medical History:   Diagnosis Date    Abscess of scrotum     MARTITA (acute kidney injury)     Altered mental state     Arthritis     AS (ankylosing spondylitis)     History of MRSA infection     Hypertension     Leukocytosis     Tetrahydrocannabinol (THC) use disorder, mild, abuse 2023    Uveitis        CONSULTS   GI  Nutrition  Oncology  Surgery  Podiatry  ID  Cardiology  Palliative Care  Hospice    HOSPITAL COURSE  Patient is a 79 y.o. male with complicated history including ankylosing spondylitis of c-spine, BPH, chronic forde catheter, hydronephrosis, HTN, dysphagia with PEG for TF, with worsening sacral and ischial pressure ulcers.     He was initially admitted with acute on chronic anemia as well as failure to thrive, UTI, multiple decubitus wounds. His sacral and heel wounds looked terrible and he went to the operating room yesterday for debridement by general surgery and podiatry.  Podiatry, ID, and Surgery feel his osteomyelitis and wounds are not curable and recommend palliative wound care. He will go to facility with hospice and do NS or dakins wet to dry dressing, changing it BID and when contaminated as as otherwise recommended by wound care. He should wear protective boots for heel protection.     Patient should have one more dose of augmentin and 5 more doses of cipro per ID recommendation.    Final wound RN recommendations    CWOCN- follow up on wound vac therapy. Patient is leaving today. He is planning on having [facility] and therefore cannot have VAC therapy. We removed the VAC dressings.      Left heel is moist, red, granulating and tissue appears healthy. Periwound is moist, pink. We placed gauze moistened with Vashe wound solution and wrapped with kerlix.      Left ischium is moist, red with less slough in the wound bed today. Wound bed is improved in color. We placed hydrofera blue classic moistened with NS and then placed gauze moistened with Vashe and a silicone border dressing.      Coccyx is moist red. The right buttock continues with periwound skin breakdown/DTI however the area of DTI is progressing to an area of soft tissue necrosis. The area of soft tissue necrosis appears smaller than Monday showing some demarcation and surprisingly improvement. We placed Vashe moistened gauze to the open wound and placed opticel over the adjacent soft tissue necrosis.      Patient to remain on a low air loss mattress with turning. Off load the heel.           08/28/24 1341   Wound 08/20/24 1259 Left posterior heel Incision   Placement Date/Time: 08/20/24 1259   Side: Left  Orientation: posterior  Location: heel  Primary Wound Type: Incision   Pressure Injury Stage 4   Dressing Appearance intact   Base moist;pink;red;granulating   Periwound moist;pink   Periwound Temperature warm   Periwound Skin Turgor soft   Edges open   Drainage Characteristics/Odor serosanguineous   Drainage Amount  scant   Care, Wound cleansed with;sterile normal saline;negative pressure wound therapy   Dressing Care dressing changed;gauze, wet-to-moist  (vashe)   Periwound Care dry periwound area maintained   Wound Output (mL) 150   Wound 08/20/24 1259 coccyx Incision   Placement Date/Time: 08/20/24 1259   Location: coccyx  Primary Wound Type: Incision   Pressure Injury Stage 4   Dressing Appearance intact   Base clean;moist;red   Periwound moist;redness  (maroon/purple)   Periwound Temperature warm   Periwound Skin Turgor soft   Edges open   Drainage Characteristics/Odor serosanguineous   Drainage Amount small   Care, Wound cleansed with;sterile normal saline   Dressing Care dressing changed;gauze, wet-to-moist;border dressing  (vashe)   Periwound Care dry periwound area maintained   Wound Output (mL) 450   Wound 08/20/24 1259 Left gluteal Incision   Placement Date/Time: 08/20/24 1259   Side: Left  Location: gluteal  Primary Wound Type: Incision   Pressure Injury Stage 4   Dressing Appearance intact   Base moist;red;slough   Red (%), Wound Tissue Color 85   Yellow (%), Wound Tissue Color 15   Periwound pink   Periwound Temperature warm   Periwound Skin Turgor soft   Edges open   Drainage Characteristics/Odor serosanguineous   Drainage Amount scant   Care, Wound cleansed with;sterile normal saline   Dressing Care dressing changed;gauze, wet-to-moist;border dressing  (vashe, hydrofera blue)   Periwound Care dry periwound area maintained   NPWT (Negative Pressure Wound Therapy) 08/20/24 1245 left heel   Placement Date/Time: 08/20/24 1245   Location: left heel   Therapy Setting vacuum off   Sponges Removed 1   Finger sweep complete Yes   NPWT (Negative Pressure Wound Therapy) 08/22/24 1618 coccyx, left ischium   Placement Date/Time: 08/22/24 1618   Location: coccyx, left ischium   Therapy Setting vacuum off   Sponges Removed    (1 black from coccyx, 1 piece of hydrofera blue and 1 piece of black from ischium)   Finger sweep  complete Yes                DIAGNOSTICS    08/28/2024 1216 08/28/2024 1319 Comprehensive Metabolic Panel [[id number]]    (Abnormal)   Blood    Final result Component Value Units   Glucose 100 High  mg/dL   BUN 18 mg/dL   Creatinine 0.41 Low  mg/dL   Sodium 142 mmol/L   Potassium 3.9 mmol/L   Chloride 107 mmol/L   CO2 29.0 mmol/L   Calcium 8.6 mg/dL   Total Protein 7.6 g/dL   Albumin 2.5 Low  g/dL   ALT (SGPT) 28 U/L   AST (SGOT) 25 U/L   Alkaline Phosphatase 102 U/L   Total Bilirubin 0.3 mg/dL   Globulin 5.1 gm/dL   A/G Ratio 0.5 g/dL   BUN/Creatinine Ratio 43.9 High     Anion Gap 6.0 mmol/L   eGFR 109.5 mL/min/1.73          08/28/2024 1216 08/28/2024 1258 CBC Auto Differential [[id number]]   (Abnormal)   Blood    Final result Component Value Units   WBC 7.64 10*3/mm3   RBC 3.48 Low  10*6/mm3   Hemoglobin 9.5 Low  g/dL   Hematocrit 30.5 Low  %   MCV 87.6 fL   MCH 27.3 pg   MCHC 31.1 Low  g/dL   RDW 15.5 High  %   RDW-SD 48.5 fl   MPV 8.7 fL   Platelets 338 10*3/mm3   Neutrophil % 57.9 %   Lymphocyte % 26.4 %   Monocyte % 10.2 %   Eosinophil % 4.5 %   Basophil % 0.7 %   Immature Grans % 0.3 %   Neutrophils, Absolute 4.43 10*3/mm3   Lymphocytes, Absolute 2.02 10*3/mm3   Monocytes, Absolute 0.78 10*3/mm3   Eosinophils, Absolute 0.34 10*3/mm3   Basophils, Absolute 0.05 10*3/mm3   Immature Grans, Absolute 0.02 10*3/mm3   nRBC 0.0 /100 WBC        Procedure Component Value Units Date/Time    XR Calcaneus 2+ View Left [[id number]] Dez as Reviewed   Order Status: Completed Collected: 08/21/24 1204    Updated: 08/21/24 1210   Narrative:     XR CALCANEUS 2+ VW LEFT-8/21/2024     HISTORY: Left heel ulcer.     Left posterior heel soft tissue ulcer is seen with small amount of soft  tissue air. No definite erosive changes of the adjacent calcaneus is  seen. No fractures are seen.      Impression:     1. Soft tissue ulceration posterior to the calcaneus with no definite  bony changes seen.  2. Further evaluation with MRI of the foot  could be obtained if  clinically indicated.        This report was finalized on 8/21/2024 12:06 PM by Dr. Dario Dumont M.D on Workstation: [id number]       CT Abdomen Pelvis With Contrast [[id number]] Dez as Reviewed   Order Status: Completed Collected: 08/19/24 1933    Updated: 08/19/24 1946   Narrative:     CT ABDOMEN PELVIS W CONTRAST-     INDICATIONS: Urinary tract infection, history of urine outflow  obstruction     TECHNIQUE: Radiation dose reduction techniques were utilized, including  automated exposure control and exposure modulation based on body size.  Enhanced ABDOMEN AND PELVIS CT     COMPARISON: 6/18/2024     FINDINGS:     Attenuation artifact from left hip arthroplasty hardware partly obscures  structures in the pelvis, and assessment for inflammatory changes in the  abdomen and pelvis is significantly limited by paucity of mesenteric  fat.     Right renal cyst is again demonstrated. No hydronephrosis. Urinary  bladder is catheterized, mostly empty, limiting its assessment.     Otherwise unremarkable appearance of the liver, spleen, adrenal glands,  pancreas, kidneys, bladder.     No bowel obstruction. Moderate colonic fecal retention is present, with  suspected rectal stool impaction. Presacral edema raises suspicion for  proctitis. Correlate clinically. Percutaneous gastrostomy tube is in  place.     No free intraperitoneal gas. Minimal nonspecific pelvic free fluid.     A 1.2 cm short axis left para-aortic lymph node on axial image 54 is  mildly prominent, nonspecific, could be reactive in nature or  potentially evidence of neoplasm, but appears stable.     Abdominal aorta is not aneurysmal. Aortic and other arterial  calcifications are present.     The lung bases scarring and atelectasis in both lungs, with chronic  pleural-parenchymal change at the right anterior costophrenic angle.     Degenerative changes are seen in the spine. No acute fracture is  identified.            Impression:    "        1. Possible proctitis/rectal stool impaction, correlate clinically.  Minimal pelvic free fluid. Follow-up as indications persist.     2. No obstructive uropathy.     3. Mildly prominent nonspecific para-aortic lymph node.          PHYSICAL EXAM  Objective:  Vital signs: (most recent): Blood pressure 103/47, pulse 79, temperature 97.7 °F (36.5 °C), temperature source Oral, resp. rate 18, height 190.5 cm (75\"), weight 72.5 kg (159 lb 13.3 oz), SpO2 98%.                Alert  nad  No resp distress  Chronically ill  +wounds    CONDITION ON DISCHARGE  Stable.      DISCHARGE DISPOSITION   Skilled Nursing Facility (DC - External)      DISCHARGE MEDICATIONS       Your medication list        START taking these medications        Instructions Last Dose Given Next Dose Due   amoxicillin-clavulanate 875-125 MG per tablet  Commonly known as: AUGMENTIN      Take 1 tablet by mouth Every 12 (Twelve) Hours for 1 dose. Indications: Infection of the Skin and/or Soft Tissue       ciprofloxacin 500 MG tablet  Commonly known as: CIPRO      Take 1 tablet by mouth Every 12 (Twelve) Hours for 5 doses. Indications: Infection of the Skin and/or Soft Tissue       metoprolol succinate XL 25 MG 24 hr tablet  Commonly known as: TOPROL-XL  Start taking on: August 29, 2024      Take 1 tablet by mouth Daily.              CHANGE how you take these medications        Instructions Last Dose Given Next Dose Due   polyethylene glycol 17 g packet  Commonly known as: MIRALAX  What changed: Another medication with the same name was added. Make sure you understand how and when to take each.      Take 17 g by mouth Daily As Needed (Use if senna-docusate is ineffective).       polyethylene glycol 17 g packet  Commonly known as: MIRALAX  What changed: You were already taking a medication with the same name, and this prescription was added. Make sure you understand how and when to take each.      Take 17 g by mouth 2 (Two) Times a Day.     "   sennosides-docusate 8.6-50 MG per tablet  Commonly known as: PERICOLACE  What changed:   when to take this  reasons to take this      Take 2 tablets by mouth 2 (Two) Times a Day.              CONTINUE taking these medications        Instructions Last Dose Given Next Dose Due   acetaminophen 325 MG tablet  Commonly known as: TYLENOL      Take 2 tablets by mouth Every 4 (Four) Hours As Needed for Mild Pain.       atorvastatin 40 MG tablet  Commonly known as: LIPITOR           bisacodyl 5 MG EC tablet  Commonly known as: DULCOLAX      Take 1 tablet by mouth Daily As Needed for Constipation (Use if polyethylene glycol is ineffective).       bisacodyl 10 MG suppository  Commonly known as: DULCOLAX      Insert 1 suppository into the rectum Daily As Needed for Constipation (Use if bisacodyl oral is ineffective).       calcium carbonate 500 MG chewable tablet  Commonly known as: TUMS      Chew 2 tablets 2 (Two) Times a Day As Needed for Heartburn.       cyclobenzaprine 10 MG tablet  Commonly known as: FLEXERIL      TAKE 0.5   1 TABLET BY ORAL ROUTE AT BEDTIME AS NEEDED       Effer-K 20 MEQ effervescent tablet  Generic drug: Potassium Bicarb-Citric Acid           lansoprazole 30 MG Tablet Delayed Release Dispersible disintegrating tablet  Commonly known as: PREVACID SOLUTAB      Administer 1 tablet per G tube Every Morning.       levothyroxine 25 MCG tablet  Commonly known as: SYNTHROID, LEVOTHROID           melatonin 3 MG tablet      Take 1 tablet by mouth At Night As Needed for Sleep.       methotrexate 2.5 MG tablet      Take 1 tablet by mouth 1 (One) Time Per Week. Take 2.5 mg on Wednesday and 2.5 mg on Thursday.  Do not take any medication on Friday through Tuesday.       potassium chloride 20 mEq/15 mL solution  Commonly known as: KAYCIEL      Take 15 mL by mouth Daily.       terazosin 1 MG capsule  Commonly known as: HYTRIN      Administer 1 capsule per G tube Every Night.       VITAMIN C PO      Daily.               STOP taking these medications      castor oil-balsam peru ointment        HYDROcodone-acetaminophen 5-325 MG per tablet  Commonly known as: NORCO        Menthol-Zinc Oxide 0.44-20.6 % ointment        mupirocin 2 % ointment  Commonly known as: BACTROBAN        OMEGA 3 PO        Santyl 250 UNIT/GM ointment  Generic drug: collagenase        VITAMIN D PO                  Where to Get Your Medications        These medications were sent to [facility] [place], [place] - [address] - [phone] [facility] [phone]   [address] [facility] [address] [place] [address]      Phone: [phone]   ciprofloxacin 500 MG tablet       Information about where to get these medications is not yet available    Ask your nurse or doctor about these medications  amoxicillin-clavulanate 875-125 MG per tablet  metoprolol succinate XL 25 MG 24 hr tablet  polyethylene glycol 17 g packet  sennosides-docusate 8.6-50 MG per tablet          Future Appointments   Date Time Provider Department Center   8/30/2024 10:30 AM Placido Shirley APRN [facility]   9/11/2024  9:00 AM LAB CHAIR 1 [facility]ELARDO JAIN  LAB [facility]   9/11/2024  9:20 AM Rick Quarles MD [facility]   9/11/2024 10:00 AM INJECTION CHAIR [facility]   10/8/2024  9:30 AM Placido Shirley APRN [facility]     Additional Instructions for the Follow-ups that You Need to Schedule       Discharge Follow-up with PCP   As directed       Currently Documented PCP:    Keshav Eason MD    PCP Phone Number:    [phone]     Follow Up Details: This week with SNF Physician               Contact information for follow-up providers       Keshav Eason MD .    Specialty: Internal Medicine  Why: This week with SNF Physician  Contact information:  [address] [place] [address]  [phone]                       Contact information for after-discharge care       Destination       [facility]  .    Service: Skilled Nursing  Contact information:  [address]  [facility] [address]  [phone]                                   TEST  RESULTS PENDING AT DISCHARGE         Nathanael Samuels MD  [facility]  08/28/24  13:59 EDT      Time: greater than 32 minutes on discharge  It was a pleasure taking care of this patient while in the hospital.

## 2024-08-28 NOTE — PROGRESS NOTES
Nutrition Services    Patient Name:  Anthony Gallegos  YOB: 1945  MRN: [id number]  Admit Date:  8/17/2024    Nutrition Services    Patient Name: Anthony Gallegos  YOB: 1945  MRN: [id number]  Admission date: 8/17/2024    PROGRESS NOTE      Encounter Information: Checking in on TF tolerance/po intake. Wound vacs in place. Palliative and hospice following. Possible dc back to facility under hospice care.       PO Diet: Diet: Regular/House; Texture: Pureed (NDD 1); Fluid Consistency: Honey Thick   PO Supplements: Erich BID  Magic Cup BID   PO Intake:  Minimal per EMR - does not like pureed diet, tolerating liquids.       Current nutrition support: Nutren 2.0 at 45 mL/hr. Free water flush of 30 mL q 4 hrs.   Nutrition support review: Tolerating TF at goal per EMR. MD to manage free water flushes.       Labs (reviewed below): Labs reviewed; management per MD.       GI Function:  Fecal incontinence, last BM 8/27 - smear       Nutrition Intervention Updates: Plan/Recommendations  Good po intake encouraged and will monitor  Continue Magic Cups BID (L/D) to support po intake  Gtube feeds of Nutren 2.0 at goal of 45ml/hr  Water flushes [id number] to manage  Erich 1 pkg bolus BID via gtube for wound healing  If po intake improves, consider nocturnal feeds  Will monitor GOC/POC    RD to follow     Results from last 7 days   Lab Units 08/27/24  0620 08/26/24  0547 08/25/24  0656   SODIUM mmol/L 140 140 138   POTASSIUM mmol/L 4.0 3.7 3.8   CHLORIDE mmol/L 108* 105 105   CO2 mmol/L 27.4 28.3 29.0   BUN mg/dL 18 20 18   CREATININE mg/dL 0.45* 0.48* 0.39*   CALCIUM mg/dL 8.4* 8.6 8.6   BILIRUBIN mg/dL 0.3 0.3 0.2   ALK PHOS U/L 83 89 102   ALT (SGPT) U/L 23 30 36   AST (SGOT) U/L 23 22 27   GLUCOSE mg/dL 103* 120* 108*     Results from last 7 days   Lab Units 08/27/24  0620 08/26/24  0547 08/25/24  0656   MAGNESIUM mg/dL 2.0 2.0 2.0   PHOSPHORUS mg/dL 2.9 3.1 2.7   HEMOGLOBIN g/dL 9.0* 10.1* 10.5*    HEMATOCRIT % 28.7* 32.2* 33.9*     COVID19   Date Value Ref Range Status   03/25/2024 Not Detected Not Detected - Ref. Range Final     Lab Results   Component Value Date    HGBA1C 5.80 (H) 01/23/2024       RD to follow up per protocol.    Electronically signed by:  Mindy Rowe  08/28/24 07:38 EDT

## 2024-08-28 NOTE — PLAN OF CARE
Goal Outcome Evaluation:           Progress: declining  Outcome Evaluation: Patient alert and confused oriented to name and place. on room air. Q2turn. Wound vac X2 in place. multiple wounds with dressings CDI. FC in place. TFs continuous with FWF. needs encouraged to allow turns. VSS.

## 2024-08-28 NOTE — PAYOR COMM NOTE
"Anthony Gallegos (79 y.o. Male)        PLEASE SEE ATTACHED DC SUMMARY    REF#[id number]     THANK YOU    ANAND SHRESTHA LPN CCP   Date of Birth   1945    Social Security Number       Address   [address] [id number]    Home Phone   [phone]    MRN   [id number]       Orthodox   [facility]    Marital Status                               Admission Date   24    Admission Type   Emergency    Admitting Provider   Luther Rojas MD    Attending Provider       Department, Room/Bed   [address], S606/1       Discharge Date   2024    Discharge Disposition   Skilled Nursing Facility (DC - External)    Discharge Destination                                 Attending Provider: (none)   Allergies: No Known Allergies    Isolation: Contact   Infection: MRSA/History Only (23), ESBL E coli (24)   Code Status: CPR    Ht: 190.5 cm (75\")   Wt: 72.5 kg (159 lb 13.3 oz)    Admission Cmt: None   Principal Problem: Acute on chronic anemia [D64.9]                   Active Insurance as of 2024       Primary Coverage       Payor Plan Insurance Group Employer/Plan Group    AETNA MEDICARE REPLACEMENT AETNA MED ADV POS [id number]       Payor Plan Address Payor Plan Phone Number Payor Plan Fax Number Effective Dates    [address] [phone]  2023 - None Entered    [facility] [id number]         Subscriber Name Subscriber Birth Date Member ID       ANTHONY GALLEGOS 1945 [id number]                     Emergency Contacts        (Rel.) Home Phone Work Phone Mobile Phone    FABINATHALIE GLASGOW (Daughter) [phone] -- [phone]    MAGY GALLEGOS (Son) [phone] -- [phone]              [place]: NPI [id number]  Tax ID [id number]     Discharge Summary        Nathanael Samuels MD at 24 1351                 NAME: Anthony Gallegos ADMIT: 2024   : 1945  PCP: Keshav Eason MD    MRN: [id number] " LOS: 8 days   AGE/SEX: 79 y.o. male  ROOM: [facility]     Date of Admission:  8/17/2024  Date of Discharge:  8/28/2024    PCP: Keshav Eason MD    CHIEF COMPLAINT  Abnormal Lab and Urinary Tract Infection      DISCHARGE DIAGNOSIS  Active Hospital Problems    Diagnosis  POA    **Acute on chronic anemia [D64.9]  Yes    Hypothyroidism (acquired) [E03.9]  Yes    Elevated liver function tests [R79.89]  Yes    Indwelling Forde catheter present [Z97.8]  Not Applicable    Thyroid function test abnormal [R94.6]  Yes    Dysphagia [R13.10]  Yes    Malnutrition [E46]  Yes    Feeding by G-tube [Z93.1]  Not Applicable    Hyperlipidemia [E78.5]  Yes    Sacral wound [S31.000A]  Unknown    UTI (urinary tract infection) [N39.0]  Yes    Enlarged prostate [N40.0]  Yes    Essential hypertension [I10]  Yes    Ankylosing spondylitis [M45.9]  Yes      Resolved Hospital Problems   No resolved problems to display.       SECONDARY DIAGNOSES  Past Medical History:   Diagnosis Date    Abscess of scrotum     MARTITA (acute kidney injury)     Altered mental state     Arthritis     AS (ankylosing spondylitis)     History of MRSA infection     Hypertension     Leukocytosis     Tetrahydrocannabinol (THC) use disorder, mild, abuse 12/20/2023    Uveitis        CONSULTS   GI  Nutrition  Oncology  Surgery  Podiatry  ID  Cardiology  Palliative Care  Hospice    HOSPITAL COURSE  Patient is a 79 y.o. male with complicated history including ankylosing spondylitis of c-spine, BPH, chronic forde catheter, hydronephrosis, HTN, dysphagia with PEG for TF, with worsening sacral and ischial pressure ulcers.     He was initially admitted with acute on chronic anemia as well as failure to thrive, UTI, multiple decubitus wounds. His sacral and heel wounds looked terrible and he went to the operating room yesterday for debridement by general surgery and podiatry. Podiatry, ID, and Surgery feel his osteomyelitis and wounds are not curable and recommend palliative wound care.  He will go to facility with hospice and do NS or dakins wet to dry dressing, changing it BID and when contaminated as as otherwise recommended by wound care. He should wear protective boots for heel protection.     Patient should have one more dose of augmentin and 5 more doses of cipro per ID recommendation.    Final wound RN recommendations    CWOCN- follow up on wound vac therapy. Patient is leaving today. He is planning on having [facility] and therefore cannot have VAC therapy. We removed the VAC dressings.      Left heel is moist, red, granulating and tissue appears healthy. Periwound is moist, pink. We placed gauze moistened with Vashe wound solution and wrapped with kerlix.      Left ischium is moist, red with less slough in the wound bed today. Wound bed is improved in color. We placed hydrofera blue classic moistened with NS and then placed gauze moistened with Vashe and a silicone border dressing.      Coccyx is moist red. The right buttock continues with periwound skin breakdown/DTI however the area of DTI is progressing to an area of soft tissue necrosis. The area of soft tissue necrosis appears smaller than Monday showing some demarcation and surprisingly improvement. We placed Vashe moistened gauze to the open wound and placed opticel over the adjacent soft tissue necrosis.      Patient to remain on a low air loss mattress with turning. Off load the heel.           08/28/24 1341   Wound 08/20/24 1259 Left posterior heel Incision   Placement Date/Time: 08/20/24 1259   Side: Left  Orientation: posterior  Location: heel  Primary Wound Type: Incision   Pressure Injury Stage 4   Dressing Appearance intact   Base moist;pink;red;granulating   Periwound moist;pink   Periwound Temperature warm   Periwound Skin Turgor soft   Edges open   Drainage Characteristics/Odor serosanguineous   Drainage Amount scant   Care, Wound cleansed with;sterile normal saline;negative pressure wound therapy   Dressing Care dressing  changed;gauze, wet-to-moist  (vashe)   Periwound Care dry periwound area maintained   Wound Output (mL) 150   Wound 08/20/24 1259 coccyx Incision   Placement Date/Time: 08/20/24 1259   Location: coccyx  Primary Wound Type: Incision   Pressure Injury Stage 4   Dressing Appearance intact   Base clean;moist;red   Periwound moist;redness  (maroon/purple)   Periwound Temperature warm   Periwound Skin Turgor soft   Edges open   Drainage Characteristics/Odor serosanguineous   Drainage Amount small   Care, Wound cleansed with;sterile normal saline   Dressing Care dressing changed;gauze, wet-to-moist;border dressing  (vashe)   Periwound Care dry periwound area maintained   Wound Output (mL) 450   Wound 08/20/24 1259 Left gluteal Incision   Placement Date/Time: 08/20/24 1259   Side: Left  Location: gluteal  Primary Wound Type: Incision   Pressure Injury Stage 4   Dressing Appearance intact   Base moist;red;slough   Red (%), Wound Tissue Color 85   Yellow (%), Wound Tissue Color 15   Periwound pink   Periwound Temperature warm   Periwound Skin Turgor soft   Edges open   Drainage Characteristics/Odor serosanguineous   Drainage Amount scant   Care, Wound cleansed with;sterile normal saline   Dressing Care dressing changed;gauze, wet-to-moist;border dressing  (vashe, hydrofera blue)   Periwound Care dry periwound area maintained   NPWT (Negative Pressure Wound Therapy) 08/20/24 1245 left heel   Placement Date/Time: 08/20/24 1245   Location: left heel   Therapy Setting vacuum off   Sponges Removed 1   Finger sweep complete Yes   NPWT (Negative Pressure Wound Therapy) 08/22/24 1618 coccyx, left ischium   Placement Date/Time: 08/22/24 1618   Location: coccyx, left ischium   Therapy Setting vacuum off   Sponges Removed    (1 black from coccyx, 1 piece of hydrofera blue and 1 piece of black from ischium)   Finger sweep complete Yes                DIAGNOSTICS    08/28/2024 1216 08/28/2024 1319 Comprehensive Metabolic Panel [[id number]]     (Abnormal)   Blood    Final result Component Value Units   Glucose 100 High  mg/dL   BUN 18 mg/dL   Creatinine 0.41 Low  mg/dL   Sodium 142 mmol/L   Potassium 3.9 mmol/L   Chloride 107 mmol/L   CO2 29.0 mmol/L   Calcium 8.6 mg/dL   Total Protein 7.6 g/dL   Albumin 2.5 Low  g/dL   ALT (SGPT) 28 U/L   AST (SGOT) 25 U/L   Alkaline Phosphatase 102 U/L   Total Bilirubin 0.3 mg/dL   Globulin 5.1 gm/dL   A/G Ratio 0.5 g/dL   BUN/Creatinine Ratio 43.9 High     Anion Gap 6.0 mmol/L   eGFR 109.5 mL/min/1.73          08/28/2024 1216 08/28/2024 1258 CBC Auto Differential [[id number]]   (Abnormal)   Blood    Final result Component Value Units   WBC 7.64 10*3/mm3   RBC 3.48 Low  10*6/mm3   Hemoglobin 9.5 Low  g/dL   Hematocrit 30.5 Low  %   MCV 87.6 fL   MCH 27.3 pg   MCHC 31.1 Low  g/dL   RDW 15.5 High  %   RDW-SD 48.5 fl   MPV 8.7 fL   Platelets 338 10*3/mm3   Neutrophil % 57.9 %   Lymphocyte % 26.4 %   Monocyte % 10.2 %   Eosinophil % 4.5 %   Basophil % 0.7 %   Immature Grans % 0.3 %   Neutrophils, Absolute 4.43 10*3/mm3   Lymphocytes, Absolute 2.02 10*3/mm3   Monocytes, Absolute 0.78 10*3/mm3   Eosinophils, Absolute 0.34 10*3/mm3   Basophils, Absolute 0.05 10*3/mm3   Immature Grans, Absolute 0.02 10*3/mm3   nRBC 0.0 /100 WBC        Procedure Component Value Units Date/Time    XR Calcaneus 2+ View Left [[id number]] Dez as Reviewed   Order Status: Completed Collected: 08/21/24 1204    Updated: 08/21/24 1210   Narrative:     XR CALCANEUS 2+ VW LEFT-8/21/2024     HISTORY: Left heel ulcer.     Left posterior heel soft tissue ulcer is seen with small amount of soft  tissue air. No definite erosive changes of the adjacent calcaneus is  seen. No fractures are seen.      Impression:     1. Soft tissue ulceration posterior to the calcaneus with no definite  bony changes seen.  2. Further evaluation with MRI of the foot could be obtained if  clinically indicated.        This report was finalized on 8/21/2024 12:06 PM by Dr. Bishop  BALJIT Dumont on Workstation: [id number]       CT Abdomen Pelvis With Contrast [[id number]] Dez as Reviewed   Order Status: Completed Collected: 08/19/24 1933    Updated: 08/19/24 1946   Narrative:     CT ABDOMEN PELVIS W CONTRAST-     INDICATIONS: Urinary tract infection, history of urine outflow  obstruction     TECHNIQUE: Radiation dose reduction techniques were utilized, including  automated exposure control and exposure modulation based on body size.  Enhanced ABDOMEN AND PELVIS CT     COMPARISON: 6/18/2024     FINDINGS:     Attenuation artifact from left hip arthroplasty hardware partly obscures  structures in the pelvis, and assessment for inflammatory changes in the  abdomen and pelvis is significantly limited by paucity of mesenteric  fat.     Right renal cyst is again demonstrated. No hydronephrosis. Urinary  bladder is catheterized, mostly empty, limiting its assessment.     Otherwise unremarkable appearance of the liver, spleen, adrenal glands,  pancreas, kidneys, bladder.     No bowel obstruction. Moderate colonic fecal retention is present, with  suspected rectal stool impaction. Presacral edema raises suspicion for  proctitis. Correlate clinically. Percutaneous gastrostomy tube is in  place.     No free intraperitoneal gas. Minimal nonspecific pelvic free fluid.     A 1.2 cm short axis left para-aortic lymph node on axial image 54 is  mildly prominent, nonspecific, could be reactive in nature or  potentially evidence of neoplasm, but appears stable.     Abdominal aorta is not aneurysmal. Aortic and other arterial  calcifications are present.     The lung bases scarring and atelectasis in both lungs, with chronic  pleural-parenchymal change at the right anterior costophrenic angle.     Degenerative changes are seen in the spine. No acute fracture is  identified.            Impression:           1. Possible proctitis/rectal stool impaction, correlate clinically.  Minimal pelvic free fluid. Follow-up  "as indications persist.     2. No obstructive uropathy.     3. Mildly prominent nonspecific para-aortic lymph node.          PHYSICAL EXAM  Objective:  Vital signs: (most recent): Blood pressure 103/47, pulse 79, temperature 97.7 °F (36.5 °C), temperature source Oral, resp. rate 18, height 190.5 cm (75\"), weight 72.5 kg (159 lb 13.3 oz), SpO2 98%.                Alert  nad  No resp distress  Chronically ill  +wounds    CONDITION ON DISCHARGE  Stable.      DISCHARGE DISPOSITION   Skilled Nursing Facility (DC - External)      DISCHARGE MEDICATIONS       Your medication list        START taking these medications        Instructions Last Dose Given Next Dose Due   amoxicillin-clavulanate 875-125 MG per tablet  Commonly known as: AUGMENTIN      Take 1 tablet by mouth Every 12 (Twelve) Hours for 1 dose. Indications: Infection of the Skin and/or Soft Tissue       ciprofloxacin 500 MG tablet  Commonly known as: CIPRO      Take 1 tablet by mouth Every 12 (Twelve) Hours for 5 doses. Indications: Infection of the Skin and/or Soft Tissue       metoprolol succinate XL 25 MG 24 hr tablet  Commonly known as: TOPROL-XL  Start taking on: August 29, 2024      Take 1 tablet by mouth Daily.              CHANGE how you take these medications        Instructions Last Dose Given Next Dose Due   polyethylene glycol 17 g packet  Commonly known as: MIRALAX  What changed: Another medication with the same name was added. Make sure you understand how and when to take each.      Take 17 g by mouth Daily As Needed (Use if senna-docusate is ineffective).       polyethylene glycol 17 g packet  Commonly known as: MIRALAX  What changed: You were already taking a medication with the same name, and this prescription was added. Make sure you understand how and when to take each.      Take 17 g by mouth 2 (Two) Times a Day.       sennosides-docusate 8.6-50 MG per tablet  Commonly known as: PERICOLACE  What changed:   when to take this  reasons to take " this      Take 2 tablets by mouth 2 (Two) Times a Day.              CONTINUE taking these medications        Instructions Last Dose Given Next Dose Due   acetaminophen 325 MG tablet  Commonly known as: TYLENOL      Take 2 tablets by mouth Every 4 (Four) Hours As Needed for Mild Pain.       atorvastatin 40 MG tablet  Commonly known as: LIPITOR           bisacodyl 5 MG EC tablet  Commonly known as: DULCOLAX      Take 1 tablet by mouth Daily As Needed for Constipation (Use if polyethylene glycol is ineffective).       bisacodyl 10 MG suppository  Commonly known as: DULCOLAX      Insert 1 suppository into the rectum Daily As Needed for Constipation (Use if bisacodyl oral is ineffective).       calcium carbonate 500 MG chewable tablet  Commonly known as: TUMS      Chew 2 tablets 2 (Two) Times a Day As Needed for Heartburn.       cyclobenzaprine 10 MG tablet  Commonly known as: FLEXERIL      TAKE 0.5   1 TABLET BY ORAL ROUTE AT BEDTIME AS NEEDED       Effer-K 20 MEQ effervescent tablet  Generic drug: Potassium Bicarb-Citric Acid           lansoprazole 30 MG Tablet Delayed Release Dispersible disintegrating tablet  Commonly known as: PREVACID SOLUTAB      Administer 1 tablet per G tube Every Morning.       levothyroxine 25 MCG tablet  Commonly known as: SYNTHROID, LEVOTHROID           melatonin 3 MG tablet      Take 1 tablet by mouth At Night As Needed for Sleep.       methotrexate 2.5 MG tablet      Take 1 tablet by mouth 1 (One) Time Per Week. Take 2.5 mg on Wednesday and 2.5 mg on Thursday.  Do not take any medication on Friday through Tuesday.       potassium chloride 20 mEq/15 mL solution  Commonly known as: KAYCIEL      Take 15 mL by mouth Daily.       terazosin 1 MG capsule  Commonly known as: HYTRIN      Administer 1 capsule per G tube Every Night.       VITAMIN C PO      Daily.              STOP taking these medications      castor oil-balsam peru ointment        HYDROcodone-acetaminophen 5-325 MG per  tablet  Commonly known as: NORCO        Menthol-Zinc Oxide 0.44-20.6 % ointment        mupirocin 2 % ointment  Commonly known as: BACTROBAN        OMEGA 3 PO        Santyl 250 UNIT/GM ointment  Generic drug: collagenase        VITAMIN D PO                  Where to Get Your Medications        These medications were sent to [facility] [place], [place] - [address] - [phone] [facility] [phone]   [address] [facility], [facility] [address]      Phone: [phone]   ciprofloxacin 500 MG tablet       Information about where to get these medications is not yet available    Ask your nurse or doctor about these medications  amoxicillin-clavulanate 875-125 MG per tablet  metoprolol succinate XL 25 MG 24 hr tablet  polyethylene glycol 17 g packet  sennosides-docusate 8.6-50 MG per tablet          Future Appointments   Date Time Provider Department Center   8/30/2024 10:30 AM Placido Shirley APRN [facility]   9/11/2024  9:00 AM LAB CHAIR [facility]  LAB [facility]   9/11/2024  9:20 AM Rick Quarles MD [facility] [place]   9/11/2024 10:00 AM INJECTION CHAIR [facility]   10/8/2024  9:30 AM Placido Shirley APRN [facility]     Additional Instructions for the Follow-ups that You Need to Schedule       Discharge Follow-up with PCP   As directed       Currently Documented PCP:    Keshav Eason MD    PCP Phone Number:    [phone]     Follow Up Details: This week with SNF Physician               Contact information for follow-up providers       Keshav Eason MD .    Specialty: Internal Medicine  Why: This week with SNF Physician  Contact information:  [address] [facility] [facility] [address]  [phone]                       Contact information for after-discharge care       Destination       [facility] .    Service: Skilled Nursing  Contact information:  [address] [facility] [address]  [phone]                                    TEST  RESULTS PENDING AT DISCHARGE         Nathanael Samuels MD  [facility]  08/28/24  13:59 EDT      Time: greater than 32 minutes on discharge  It was a pleasure taking care of this patient while in the hospital.       Electronically signed by Nathanael Samuels MD at 08/28/24 1400       Discharge Order (From admission, onward)       Start     Ordered    08/28/24 1200  Discharge patient  Once        Expected Discharge Date: 08/28/24   Discharge Disposition: Skilled Nursing Facility (DC - External)   Physician of Record for Attribution - Please select from Treatment Team: NATHANAEL SAMUELS [[id number]]   Review needed by CMO to determine Physician of Record: No      Question Answer Comment   Physician of Record for Attribution - Please select from Treatment Team NATHANAEL SAMUELS    Review needed by CMO to determine Physician of Record No        08/28/24 1208

## 2024-08-28 NOTE — PLAN OF CARE
Goal Outcome Evaluation:            VSS, no new complaints this shift. Wound Is replacing dressings prior to discharge. Plan for D/C this afternoon via ambulance.

## 2024-08-28 NOTE — PLAN OF CARE
Problem: Adult Inpatient Plan of Care  Goal: Plan of Care Review  Outcome: Ongoing, Not Progressing  Goal: Patient-Specific Goal (Individualized)  Outcome: Ongoing, Not Progressing  Goal: Absence of Hospital-Acquired Illness or Injury  Outcome: Ongoing, Not Progressing  Intervention: Identify and Manage Fall Risk  Recent Flowsheet Documentation  Taken 8/28/2024 1205 by Kanwal Andrews RN  Safety Promotion/Fall Prevention:   safety round/check completed   room organization consistent   nonskid shoes/slippers when out of bed   fall prevention program maintained   clutter free environment maintained   assistive device/personal items within reach  Taken 8/28/2024 1020 by Kanwal Andrews RN  Safety Promotion/Fall Prevention:   safety round/check completed   room organization consistent   nonskid shoes/slippers when out of bed   fall prevention program maintained   clutter free environment maintained   assistive device/personal items within reach  Taken 8/28/2024 0800 by Kanwal Andrews RN  Safety Promotion/Fall Prevention:   safety round/check completed   room organization consistent   nonskid shoes/slippers when out of bed   fall prevention program maintained   clutter free environment maintained   assistive device/personal items within reach  Intervention: Prevent Skin Injury  Recent Flowsheet Documentation  Taken 8/28/2024 1205 by Kanwal Andrews RN  Body Position:   turned   tilted   right  Taken 8/28/2024 1020 by Kanwal Andrews RN  Body Position:   turned   tilted  Skin Protection: skin sealant/moisture barrier applied  Intervention: Prevent Infection  Recent Flowsheet Documentation  Taken 8/28/2024 1020 by Kanwal Andrews RN  Infection Prevention: hand hygiene promoted  Goal: Optimal Comfort and Wellbeing  Outcome: Ongoing, Not Progressing  Intervention: Provide Person-Centered Care  Recent Flowsheet Documentation  Taken 8/28/2024 1020 by Kanwal Andrews RN  Trust  Relationship/Rapport:   care explained   questions answered  Goal: Readiness for Transition of Care  Outcome: Ongoing, Not Progressing     Problem: Fall Injury Risk  Goal: Absence of Fall and Fall-Related Injury  Outcome: Ongoing, Not Progressing  Intervention: Promote Injury-Free Environment  Recent Flowsheet Documentation  Taken 8/28/2024 1205 by Kanwal Andrews RN  Safety Promotion/Fall Prevention:   safety round/check completed   room organization consistent   nonskid shoes/slippers when out of bed   fall prevention program maintained   clutter free environment maintained   assistive device/personal items within reach  Taken 8/28/2024 1020 by Kanwal Andrews RN  Safety Promotion/Fall Prevention:   safety round/check completed   room organization consistent   nonskid shoes/slippers when out of bed   fall prevention program maintained   clutter free environment maintained   assistive device/personal items within reach  Taken 8/28/2024 0800 by Kanwal Andrews RN  Safety Promotion/Fall Prevention:   safety round/check completed   room organization consistent   nonskid shoes/slippers when out of bed   fall prevention program maintained   clutter free environment maintained   assistive device/personal items within reach     Problem: Skin Injury Risk Increased  Goal: Skin Health and Integrity  Outcome: Ongoing, Not Progressing  Intervention: Optimize Skin Protection  Recent Flowsheet Documentation  Taken 8/28/2024 1020 by Kanwal Andrews RN  Pressure Reduction Techniques:   weight shift assistance provided   positioned off wounds  Pressure Reduction Devices:   specialty bed utilized   heel offloading device utilized  Skin Protection: skin sealant/moisture barrier applied     Problem: Malnutrition  Goal: Improved Nutritional Intake  Outcome: Ongoing, Not Progressing     Problem: Oral Intake Inadequate  Goal: Improved Oral Intake  Outcome: Ongoing, Not Progressing   Goal Outcome Evaluation:

## 2024-08-28 NOTE — PROGRESS NOTES
Case Management Discharge Note      Final Note: DC'd to  bed at [facility] via [facility]    Provided Post Acute Provider List?: N/A  N/A Provider List Comment: From [facility] and will return    Selected Continued Care - Admitted Since 8/17/2024       Destination Coordination complete.      Service Provider Selected Services Address Phone Fax Patient Preferred    [facility] [address] [address] [phone] --                          Selected Continued Care - Prior Encounters Includes continued care and service providers with selected services from prior encounters from 5/19/2024 to 8/28/2024      Discharged on 6/27/2024 Admission date: 6/17/2024 - Discharge disposition: Intermediate Care       Destination       Service Provider Selected Services Address Phone Fax Patient Preferred    [facility]illed [facility] [address] [address] [phone] --                          Transportation Services  Ambulance: [facility]    Final Discharge Disposition Code: 04 - intermediate care facility

## 2024-08-28 NOTE — PROGRESS NOTES
Continued Stay Note  [facility]     Patient Name: Anthony Gallegos  MRN: [id number]  Today's Date: 8/28/2024    Admit Date: 8/17/2024    Plan: Return to [facility] via [facility]   Discharge Plan       Row Name 08/28/24 1414       Plan    Plan Return to [facility] via [facility]    Patient/Family in Agreement with Plan yes    Plan Comments Spoke with daughter Whit by telephone.  She confirms plan to return to [facility] with hospitals following.  Spoke with [facility] and [facility] bed available.  Call to [facility] and received return call from Charmaine and they are aware of DC.  Spoke with carmen Zaragoza at bedside.  Packet to RN.  [facility] scheduled for 3:00 p.m.  Joann MOORE                      Expected Discharge Date and Time       Expected Discharge Date Expected Discharge Time    Aug 28, 2024               Becky S. [place], RN

## 2024-08-28 NOTE — NURSING NOTE
CWOCN- follow up on wound vac therapy. Patient is leaving today. He is planning on having [facility] and therefore cannot have VAC therapy. We removed the VAC dressings.     Left heel is moist, red, granulating and tissue appears healthy. Periwound is moist, pink. We placed gauze moistened with Vashe wound solution and wrapped with kerlix.     Left ischium is moist, red with less slough in the wound bed today. Wound bed is improved in color. We placed hydrofera blue classic moistened with NS and then placed gauze moistened with Vashe and a silicone border dressing.     Coccyx is moist red. The right buttock continues with periwound skin breakdown/DTI however the area of DTI is progressing to an area of soft tissue necrosis. The area of soft tissue necrosis appears smaller than Monday showing some demarcation and surprisingly improvement. We placed Vashe moistened gauze to the open wound and placed opticel over the adjacent soft tissue necrosis.     Patient to remain on a low air loss mattress with turning. Off load the heel.        08/28/24 1341   Wound 08/20/24 1259 Left posterior heel Incision   Placement Date/Time: 08/20/24 1259   Side: Left  Orientation: posterior  Location: heel  Primary Wound Type: Incision   Pressure Injury Stage 4   Dressing Appearance intact   Base moist;pink;red;granulating   Periwound moist;pink   Periwound Temperature warm   Periwound Skin Turgor soft   Edges open   Drainage Characteristics/Odor serosanguineous   Drainage Amount scant   Care, Wound cleansed with;sterile normal saline;negative pressure wound therapy   Dressing Care dressing changed;gauze, wet-to-moist  (vashe)   Periwound Care dry periwound area maintained   Wound Output (mL) 150   Wound 08/20/24 1259 coccyx Incision   Placement Date/Time: 08/20/24 1259   Location: coccyx  Primary Wound Type: Incision   Pressure Injury Stage 4   Dressing Appearance intact   Base clean;moist;red   Periwound moist;redness  (maroon/purple)    Periwound Temperature warm   Periwound Skin Turgor soft   Edges open   Drainage Characteristics/Odor serosanguineous   Drainage Amount small   Care, Wound cleansed with;sterile normal saline   Dressing Care dressing changed;gauze, wet-to-moist;border dressing  (vashe)   Periwound Care dry periwound area maintained   Wound Output (mL) 450   Wound 08/20/24 1259 Left gluteal Incision   Placement Date/Time: 08/20/24 1259   Side: Left  Location: gluteal  Primary Wound Type: Incision   Pressure Injury Stage 4   Dressing Appearance intact   Base moist;red;slough   Red (%), Wound Tissue Color 85   Yellow (%), Wound Tissue Color 15   Periwound pink   Periwound Temperature warm   Periwound Skin Turgor soft   Edges open   Drainage Characteristics/Odor serosanguineous   Drainage Amount scant   Care, Wound cleansed with;sterile normal saline   Dressing Care dressing changed;gauze, wet-to-moist;border dressing  (vashe, hydrofera blue)   Periwound Care dry periwound area maintained   NPWT (Negative Pressure Wound Therapy) 08/20/24 1245 left heel   Placement Date/Time: 08/20/24 1245   Location: left heel   Therapy Setting vacuum off   Sponges Removed 1   Finger sweep complete Yes   NPWT (Negative Pressure Wound Therapy) 08/22/24 1618 coccyx, left ischium   Placement Date/Time: 08/22/24 1618   Location: coccyx, left ischium   Therapy Setting vacuum off   Sponges Removed   (1 black from coccyx, 1 piece of hydrofera blue and 1 piece of black from ischium)   Finger sweep complete Yes

## 2024-08-29 NOTE — PROGRESS NOTES
Chief Complaint  GI Problem and Difficulty Swallowing    Subjective        Anthony Gallegos presents to our practice for follow up from previous visit for multitude GI issues. Last seen by this nurse practitioner on 8/8/24.    He resides in [facility] facility - [facility]. PMH is complex, including chronic anemia with a baseline hemoglobin trending around 9, immobility, hypertension, recurrent UTIs, severe malnutrition, just to name a few.  He in wheelchair-bound.   He presents with a medical assistant who accompanies him today.  Limited review of systems contributed by patient.  Medical assistant just returned to work from her vacation, little to no information was assisted.  Her medical records attached to patient visit today reveal labs that were already reviewed by this nurse practitioner from the epic system.    Currently, he denies GI concerns.  Denies abdominal pain, no nausea vomiting.  Normal bowel movement, no blood in stool.  Continue some nutritional support through the PEG tube as well as some by mouth.  Patient denies difficulty swallowing.  No overt signs or symptoms of bleeding.  Patient is alert, awake, follows directions, knows where he is.    Dysphagia  He has been having difficulty swallowing and failed multiple swallowing tests, required Peg Tube placement in 4/9/24.     6/18/24 video swallowing - moderate oropharyngeal dysphagia with reduced pharyngeal constriction and reduced airway protection. This did not occur with pureed or honey consistencies.    This swallowing problem is not new.  No invasive intervention such as upper endoscopy warrants, as multiple times discussed with his daughter, POA due to risk under general anesthesia.  No upper GI concerns reported today.    Chronic blood loss, anemia; iron deficiency  8/28/24   CMP (normal LFTs, albumin level 2.5)   CBC (hgb 9.5)    Patient has a long standing iron deficiency anemia of multifactorial, including poor  "nutrition/appetite, on Methotrexate for ankylosing spondylitis.  Previous visit, 8/8/24, I've ordered iron panel and FOBT, confirmed that he has significantly low iron level, despite oral iron supplement. Negative FOBT. Referred to hematology and this has been in progress treatment for Procrit.     On 8/8/24, patient was in the ER and was consulted by Dr. Shannon for anemia on hgb of 6.9, INR 1.52 Iron level 14, saturation 8%. No urgent endoscopy was done.     8/17/24 CT abd/pel - no bowel obstruction, moderate colonic fecal retention, suspect fecal impaction. Peg tube inplace. Normal pancreas, liver, and spleen.    6/18/2024 CT abd/pel - showed unremarkable gallbladder, liver, pancreas, spleen.  No ductal dilatation. Gaseous distention of the transverse colon, questionable mild colonic ileus.  Sigmoid colon is partially compressed.     Patient denies personal or family history of colorectal or upper GI cancer.    Objective   Vital Signs:  /60   Temp 96.6 °F (35.9 °C)   Ht 190.5 cm (75\")   Wt 67.1 kg (148 lb) Comment: per patient - unable to stand  BMI 18.50 kg/m²   Estimated body mass index is 18.5 kg/m² as calculated from the following:    Height as of this encounter: 190.5 cm (75\").    Weight as of this encounter: 67.1 kg (148 lb).       BMI is within normal parameters. No other follow-up for BMI required.      Physical Exam  Vitals and nursing note reviewed.   Constitutional:       General: He is not in acute distress.     Appearance: Normal appearance. He is not ill-appearing, toxic-appearing or diaphoretic.   Eyes:      General: No scleral icterus.     Conjunctiva/sclera: Conjunctivae normal.   Cardiovascular:      Rate and Rhythm: Normal rate and regular rhythm.      Pulses: Normal pulses.      Heart sounds: Normal heart sounds.   Pulmonary:      Effort: Pulmonary effort is normal. No respiratory distress.      Breath sounds: Normal breath sounds. No stridor.   Abdominal:      General: Abdomen is " flat. Bowel sounds are normal. There is no distension.      Palpations: Abdomen is soft. There is no mass.      Tenderness: There is no abdominal tenderness. There is no right CVA tenderness, left CVA tenderness, guarding or rebound.      Hernia: No hernia is present.   Skin:     Coloration: Skin is not jaundiced or pale.      Findings: No erythema or rash.   Neurological:      Mental Status: He is alert and oriented to person, place, and time. Mental status is at baseline.   Psychiatric:         Mood and Affect: Mood normal.        Result Review :    The following data was reviewed by: NOHEMI Julian on 08/30/2024:  Consults by Ed Shannon MD (08/18/2024 07:15)     Progress Notes by Kathy Moy PA-C (08/19/2024 08:35)     Lab Results   Component Value Date    WBC 7.64 08/28/2024    HGB 9.5 (L) 08/28/2024    HCT 30.5 (L) 08/28/2024    MCV 87.6 08/28/2024     08/28/2024     Lab Results   Component Value Date    GLUCOSE 100 (H) 08/28/2024    BUN 18 08/28/2024    CREATININE 0.41 (L) 08/28/2024     08/28/2024    K 3.9 08/28/2024     08/28/2024    CALCIUM 8.6 08/28/2024    PROTEINTOT 7.6 08/28/2024    ALBUMIN 2.5 (L) 08/28/2024    ALT 28 08/28/2024    AST 25 08/28/2024    ALKPHOS 102 08/28/2024    BILITOT 0.3 08/28/2024    GLOB 5.1 08/28/2024    AGRATIO 0.5 08/28/2024    BCR 43.9 (H) 08/28/2024    ANIONGAP 6.0 08/28/2024    EGFR 109.5 08/28/2024     Lab Results   Component Value Date    IRON 14 (L) 08/17/2024    LABIRON 8 (L) 08/17/2024    TRANSFERRIN 116 (L) 08/17/2024    TIBC 173 (L) 08/17/2024    FERRITIN 869.00 (H) 08/17/2024     Lab Results   Component Value Date    TEBVMMME58 914 08/17/2024     Lab Results   Component Value Date    OCCULTBLD Negative 08/18/2024    OCCULTBLD Negative 08/17/2024     Lab Results   Component Value Date    FOLATE >20.00 08/17/2024         Assessment and Plan     Diagnoses and all orders for this visit:    1. Severe protein-calorie malnutrition  (Primary)    2. Iron deficiency anemia, unspecified iron deficiency anemia type    3. Dysphagia, unspecified type    4. Constipation, unspecified constipation type    Discussion  Patient is an 79 year-old male, follow up with me today for multiple GI issues, high risks for physical decompensation due to advance age, extensive and complex co morbidities.    Dysphagia - continue nutritional support via PEG tube if unable to tolerate po intake, daily boost supplements.    Anemia, iron deficiency - follow up with hematology for Procrit treatment and close monitoring for blood count.     Constipation - No evidence of acute GI disease process. Continues Miralax prn for constipation and daily fiber supplement, as listed on AVS today.      I will follow-up with patient in 6 months or sooner as needed.     Follow Up     Return for 6 months or sooner .  Patient was given instructions and counseling regarding his condition or for health maintenance advice. Please see specific information pulled into the AVS if appropriate.

## 2024-08-30 ENCOUNTER — OFFICE VISIT (OUTPATIENT)
Dept: GASTROENTEROLOGY | Facility: CLINIC | Age: 79
End: 2024-08-30
Payer: MEDICARE

## 2024-08-30 VITALS
HEIGHT: 75 IN | DIASTOLIC BLOOD PRESSURE: 60 MMHG | WEIGHT: 148 LBS | TEMPERATURE: 96.6 F | BODY MASS INDEX: 18.4 KG/M2 | SYSTOLIC BLOOD PRESSURE: 100 MMHG

## 2024-08-30 DIAGNOSIS — D50.9 IRON DEFICIENCY ANEMIA, UNSPECIFIED IRON DEFICIENCY ANEMIA TYPE: ICD-10-CM

## 2024-08-30 DIAGNOSIS — R13.10 DYSPHAGIA, UNSPECIFIED TYPE: ICD-10-CM

## 2024-08-30 DIAGNOSIS — E43 SEVERE PROTEIN-CALORIE MALNUTRITION: Primary | ICD-10-CM

## 2024-08-30 DIAGNOSIS — K59.00 CONSTIPATION, UNSPECIFIED CONSTIPATION TYPE: ICD-10-CM

## 2024-08-30 PROCEDURE — 1160F RVW MEDS BY RX/DR IN RCRD: CPT | Performed by: NURSE PRACTITIONER

## 2024-08-30 PROCEDURE — 3078F DIAST BP <80 MM HG: CPT | Performed by: NURSE PRACTITIONER

## 2024-08-30 PROCEDURE — 99214 OFFICE O/P EST MOD 30 MIN: CPT | Performed by: NURSE PRACTITIONER

## 2024-08-30 PROCEDURE — 3074F SYST BP LT 130 MM HG: CPT | Performed by: NURSE PRACTITIONER

## 2024-08-30 PROCEDURE — 1159F MED LIST DOCD IN RCRD: CPT | Performed by: NURSE PRACTITIONER

## 2024-08-30 RX ORDER — FOLIC ACID 1 MG/1
1 TABLET ORAL DAILY
COMMUNITY

## 2024-08-30 NOTE — PATIENT INSTRUCTIONS
Anemia - hgb level is 9.5, please continue to follow up with hematology for iron transfusion and further iron deficiency management upon their expertise. May need to have CBC done at the facility as part of ongoing monitoring for low hgb counts/blood loss - upon the discretion of medical direction of the facility.   Continue Prevacid for acid reflux, GI bleed prophylaxis   Recommend nutritional support via Gtube if unable to tolerate by mouth to avoid aspiration.  Continue Miralax 1 capful mixed with prune juice or water as tolerates, 1-2 times a day for constipation relief.   I will further discuss your condition with your daughter, POA later today.   Follow up with me in 6 months or sooner as needed.

## 2024-09-03 LAB
CYTO UR: NORMAL
LAB AP CASE REPORT: NORMAL
PATH REPORT.ADDENDUM SPEC: NORMAL
PATH REPORT.FINAL DX SPEC: NORMAL
PATH REPORT.GROSS SPEC: NORMAL

## 2024-09-11 ENCOUNTER — DOCUMENTATION (OUTPATIENT)
Dept: ONCOLOGY | Facility: CLINIC | Age: 79
End: 2024-09-11

## 2024-09-11 ENCOUNTER — OFFICE VISIT (OUTPATIENT)
Dept: ONCOLOGY | Facility: CLINIC | Age: 79
End: 2024-09-11
Payer: MEDICARE

## 2024-09-11 ENCOUNTER — LAB (OUTPATIENT)
Dept: LAB | Facility: HOSPITAL | Age: 79
End: 2024-09-11
Payer: MEDICARE

## 2024-09-11 ENCOUNTER — INFUSION (OUTPATIENT)
Dept: ONCOLOGY | Facility: HOSPITAL | Age: 79
End: 2024-09-11
Payer: MEDICARE

## 2024-09-11 ENCOUNTER — TELEPHONE (OUTPATIENT)
Dept: ONCOLOGY | Facility: CLINIC | Age: 79
End: 2024-09-11

## 2024-09-11 VITALS
HEIGHT: 75 IN | OXYGEN SATURATION: 99 % | DIASTOLIC BLOOD PRESSURE: 74 MMHG | WEIGHT: 148 LBS | TEMPERATURE: 98.6 F | BODY MASS INDEX: 18.4 KG/M2 | HEART RATE: 108 BPM | SYSTOLIC BLOOD PRESSURE: 110 MMHG

## 2024-09-11 DIAGNOSIS — D64.9 NORMOCYTIC ANEMIA: Primary | ICD-10-CM

## 2024-09-11 DIAGNOSIS — D64.9 ANEMIA, UNSPECIFIED TYPE: ICD-10-CM

## 2024-09-11 DIAGNOSIS — D64.9 ANEMIA, UNSPECIFIED TYPE: Primary | ICD-10-CM

## 2024-09-11 LAB
BASOPHILS # BLD AUTO: 0.04 10*3/MM3 (ref 0–0.2)
BASOPHILS NFR BLD AUTO: 0.6 % (ref 0–1.5)
DEPRECATED RDW RBC AUTO: 53.8 FL (ref 37–54)
EOSINOPHIL # BLD AUTO: 0.17 10*3/MM3 (ref 0–0.4)
EOSINOPHIL NFR BLD AUTO: 2.6 % (ref 0.3–6.2)
ERYTHROCYTE [DISTWIDTH] IN BLOOD BY AUTOMATED COUNT: 16.8 % (ref 12.3–15.4)
HCT VFR BLD AUTO: 37.3 % (ref 37.5–51)
HGB BLD-MCNC: 11.2 G/DL (ref 13–17.7)
IMM GRANULOCYTES # BLD AUTO: 0.02 10*3/MM3 (ref 0–0.05)
IMM GRANULOCYTES NFR BLD AUTO: 0.3 % (ref 0–0.5)
LYMPHOCYTES # BLD AUTO: 2.12 10*3/MM3 (ref 0.7–3.1)
LYMPHOCYTES NFR BLD AUTO: 32.2 % (ref 19.6–45.3)
MCH RBC QN AUTO: 26.6 PG (ref 26.6–33)
MCHC RBC AUTO-ENTMCNC: 30 G/DL (ref 31.5–35.7)
MCV RBC AUTO: 88.6 FL (ref 79–97)
MONOCYTES # BLD AUTO: 0.54 10*3/MM3 (ref 0.1–0.9)
MONOCYTES NFR BLD AUTO: 8.2 % (ref 5–12)
NEUTROPHILS NFR BLD AUTO: 3.7 10*3/MM3 (ref 1.7–7)
NEUTROPHILS NFR BLD AUTO: 56.1 % (ref 42.7–76)
NRBC BLD AUTO-RTO: 0 /100 WBC (ref 0–0.2)
PLATELET # BLD AUTO: 369 10*3/MM3 (ref 140–450)
PMV BLD AUTO: 8.5 FL (ref 6–12)
RBC # BLD AUTO: 4.21 10*6/MM3 (ref 4.14–5.8)
WBC NRBC COR # BLD AUTO: 6.59 10*3/MM3 (ref 3.4–10.8)

## 2024-09-11 PROCEDURE — 85025 COMPLETE CBC W/AUTO DIFF WBC: CPT

## 2024-09-11 PROCEDURE — G0463 HOSPITAL OUTPT CLINIC VISIT: HCPCS

## 2024-09-11 PROCEDURE — 36415 COLL VENOUS BLD VENIPUNCTURE: CPT

## 2024-09-11 NOTE — PROGRESS NOTES
Subjective       PATIENT NAME:  Anthony Gallegos  YOB: 1945  PATIENT'S SEX:  male    PATIENT'S ADDRESS:  [address] [place] [address]  PROVIDER:      Encounter Diagnosis   Name Primary?    Normocytic anemia Yes     No Known Allergies      FACILITY PROCRIT ORDERS     DATE      8/27/2024        Draw CBC every 2 weeks prior to procrit administration. Fax copy of CBC results to [facility] [phone].       Administer Procrit 20,000 units subcutaneously every 2 weeks if Hemoglobin is less than 10 g/dL.     Hold and Notify MD if Hgb greater than 10.0 g/dL     Assess response after 4 weeks of treatment and notify MD for possible dose adjustments.        Electronically signed by: Rick Quarles MD  (NPI: [id number])

## 2024-09-11 NOTE — PROGRESS NOTES
[facility]    CONSULTING IN BLOOD DISORDERS & CANCER      REASON FOR CONSULTATION/CHIEF COMPLAINT:     Evaluation and management for anemia                             REQUESTING PHYSICIAN: No ref. provider found  RECORDS OBTAINED:  Records of the patients history including those from the electronic medical record were reviewed and summarized in detail.    HISTORY OF PRESENT ILLNESS:    The patient is a 79 y.o. year old male with medical history significant for ankylosing spondylitis, on methotrexate at least since 2015, recent prolonged hospitalization (June 2024) for ESBL UTI and bacteremia comes for anemia evaluation and management.  Patient is currently a resident of a nursing home.  Has indwelling Mariscal's catheter and gets nocturnal tube feedings for history of dysphagia.    On review of chart, patient's baseline hemoglobin appears to be around 10-11 g/dL since early 2024.  Normal MCV around 85.  Normal WBC and slightly elevated platelet count.    Workup performed in early August 2024 noted iron 14, ferritin 530, iron saturation 8% and TIBC 180.  Soluble transferrin receptor normal at 20.7.  This is most consistent with anemia of chronic disease.  Normal folic acid and vitamin B12 levels.  SPEP/SUMAYA did not show any M spike.  Free light chains L evaded, kappa light chain 2023.7, lambda light chain 104.6 and FLC ratio 2.14.  Slightly elevated FLC ratio likely inflammatory/autoimmune condition related.  He may need a 24-hour UPEP/SUMAYA.    A stool for occult blood has been requested.  Not performed yet.    Patient has come to hematology clinic for further evaluation and management.  He is accompanied by his son.  Reports of generalized weakness.  He is in a wheelchair.    INTERIM HISTORY:  Patient returns to the clinic after recent hospitalization for worsening anemia, sacral/ischial and left heel osteomyelitis and E. coli UTI.  While in the hospital, he received 2 units PRBC transfusion for hemoglobin of 6.5.   Seen by GI, FOBT negative.  They did not recommend endoscopic evaluation.  He also underwent debridement of the sacral, ischial wounds and left heel wound in the operating room on 8/20/2024.  Wound VAC were placed.  He did receive antibiotics in the hospital.  There was discussion of hospice at his long-term facility.  Recovering well now.  Back to his [facility] nursing facility.    Past Medical History:   Diagnosis Date    Abscess of scrotum     MARTITA (acute kidney injury)     Altered mental state     Arthritis     AS (ankylosing spondylitis)     History of MRSA infection     Hypertension     Leukocytosis     Tetrahydrocannabinol (THC) use disorder, mild, abuse 12/20/2023    Uveitis      Past Surgical History:   Procedure Laterality Date    COLONOSCOPY      ENDOSCOPY W/ PEG TUBE PLACEMENT N/A 03/29/2024    Procedure: ESOPHAGOGASTRODUODENOSCOPY WITH PERCUTANEOUS ENDOSCOPIC GASTROSTOMY TUBE INSERTION;  Surgeon: Khadar Broderick MD;  Location: [facility] ENDOSCOPY;  Service: General;  Laterality: N/A;  PRE/POST - DYSPHAGIA    ROTATOR CUFF REPAIR Right     TOTAL HIP ARTHROPLASTY Left 2014    UPPER GASTROINTESTINAL ENDOSCOPY      WOUND DEBRIDEMENT N/A 08/20/2024    Procedure: DEBRIDEMENT SACRAL ULCER/WOUND;  Surgeon: Justine Roque MD;  Location: [facility] OR;  Service: General;  Laterality: N/A;    WOUND DEBRIDEMENT Left 08/20/2024    Procedure: LEFT DEBRIDEMENT FOOT;  Surgeon: Renny Duval DPM;  Location: [facility] OR;  Service: Podiatry;  Laterality: Left;       MEDICATIONS    Current Outpatient Medications:     acetaminophen (TYLENOL) 325 MG tablet, Take 2 tablets by mouth Every 4 (Four) Hours As Needed for Mild Pain., Disp: , Rfl:     Ascorbic Acid (VITAMIN C PO), Daily., Disp: , Rfl:     atorvastatin (LIPITOR) 40 MG tablet, , Disp: , Rfl:     bisacodyl (DULCOLAX) 10 MG suppository, Insert 1 suppository into the rectum Daily As Needed for Constipation (Use if bisacodyl oral is ineffective)., Disp:  , Rfl:     bisacodyl (DULCOLAX) 5 MG EC tablet, Take 1 tablet by mouth Daily As Needed for Constipation (Use if polyethylene glycol is ineffective)., Disp: , Rfl:     calcium carbonate (TUMS) 500 MG chewable tablet, Chew 2 tablets 2 (Two) Times a Day As Needed for Heartburn., Disp: , Rfl:     cyclobenzaprine (FLEXERIL) 10 MG tablet, TAKE 0.5   1 TABLET BY ORAL ROUTE AT BEDTIME AS NEEDED, Disp: , Rfl:     Effer-K 20 MEQ effervescent tablet, , Disp: , Rfl:     folic acid (FOLVITE) 1 MG tablet, Take 1 tablet by mouth Daily., Disp: , Rfl:     lansoprazole (PREVACID SOLUTAB) 30 MG Tablet Delayed Release Dispersible disintegrating tablet, Administer 1 tablet per G tube Every Morning., Disp: , Rfl:     levothyroxine (SYNTHROID, LEVOTHROID) 25 MCG tablet, , Disp: , Rfl:     melatonin 3 MG tablet, Take 1 tablet by mouth At Night As Needed for Sleep., Disp: , Rfl:     methotrexate 2.5 MG tablet, Take 1 tablet by mouth 1 (One) Time Per Week. Take 2.5 mg on Wednesday and 2.5 mg on Thursday.  Do not take any medication on Friday through Tuesday., Disp: , Rfl:     metoprolol succinate XL (TOPROL-XL) 25 MG 24 hr tablet, Take 1 tablet by mouth Daily., Disp: , Rfl:     polyethylene glycol (MIRALAX) 17 g packet, Take 17 g by mouth Daily As Needed (Use if senna-docusate is ineffective)., Disp: , Rfl:     polyethylene glycol (MIRALAX) 17 g packet, Take 17 g by mouth 2 (Two) Times a Day., Disp: , Rfl:     potassium chloride (KAYCIEL) 20 mEq/15 mL solution, Take 15 mL by mouth Daily., Disp: , Rfl:     sennosides-docusate (PERICOLACE) 8.6-50 MG per tablet, Take 2 tablets by mouth 2 (Two) Times a Day., Disp: , Rfl:     terazosin (HYTRIN) 1 MG capsule, Administer 1 capsule per G tube Every Night., Disp: , Rfl:     ALLERGIES:   No Known Allergies    SOCIAL HISTORY:       Social History     Socioeconomic History    Marital status:     Number of children: 2   Tobacco Use    Smoking status: Never    Smokeless tobacco: Never   Vaping Use  "   Vaping status: Never Used   Substance and Sexual Activity    Alcohol use: No    Drug use: No    Sexual activity: Defer         FAMILY HISTORY:  Family History   Problem Relation Age of Onset    Alzheimer's disease Mother     Colon cancer Neg Hx     Colon polyps Neg Hx     Crohn's disease Neg Hx     Irritable bowel syndrome Neg Hx     Ulcerative colitis Neg Hx     Malig Hyperthermia Neg Hx        REVIEW OF SYSTEMS:  As per HPI         Vitals:    09/11/24 0939   BP: 110/74   Pulse: 108   Temp: 98.6 °F (37 °C)   TempSrc: Temporal   SpO2: 99%   Weight: 67.1 kg (148 lb)   Height: 190.5 cm (75\")   PainSc: 0-No pain           9/11/2024     9:43 AM   Current Status   ECOG score 3      PHYSICAL EXAM:    CONSTITUTIONAL:  Vital signs reviewed.  Frail older male  EYES:  Conjunctiva and lids unremarkable.   EARS,NOSE,MOUTH,THROAT:  Ears and nose appear unremarkable.  Lips, teeth, gums appear unremarkable.  RESPIRATORY:  Normal respiratory effort.  Lungs clear to auscultation bilaterally.  CARDIOVASCULAR:  Normal S1, S2.  No murmurs rubs or gallops.  No significant lower extremity edema.  GASTROINTESTINAL: Abdomen appears unremarkable.  No distended  LYMPHATIC:  No cervical, supraclavicular lymphadenopathy.  NEURO: AAO x 3, no focal deficits.  Appears to have equal strength all 4 extremities.  MUSCULOSKELETAL:  Unremarkable digits/nails.  No cyanosis or clubbing.  No apparent joint deformities.  SKIN:  Warm.  No rashes.  PSYCHIATRIC:  Normal judgment and insight.  Normal mood and affect.     RECENT LABS:        Lab on 09/11/2024   Component Date Value Ref Range Status    WBC 09/11/2024 6.59  3.40 - 10.80 10*3/mm3 Final    RBC 09/11/2024 4.21  4.14 - 5.80 10*6/mm3 Final    Hemoglobin 09/11/2024 11.2 (L)  13.0 - 17.7 g/dL Final    Hematocrit 09/11/2024 37.3 (L)  37.5 - 51.0 % Final    MCV 09/11/2024 88.6  79.0 - 97.0 fL Final    MCH 09/11/2024 26.6  26.6 - 33.0 pg Final    MCHC 09/11/2024 30.0 (L)  31.5 - 35.7 g/dL Final    RDW " 09/11/2024 16.8 (H)  12.3 - 15.4 % Final    RDW-SD 09/11/2024 53.8  37.0 - 54.0 fl Final    MPV 09/11/2024 8.5  6.0 - 12.0 fL Final    Platelets 09/11/2024 369  140 - 450 10*3/mm3 Final    Neutrophil % 09/11/2024 56.1  42.7 - 76.0 % Final    Lymphocyte % 09/11/2024 32.2  19.6 - 45.3 % Final    Monocyte % 09/11/2024 8.2  5.0 - 12.0 % Final    Eosinophil % 09/11/2024 2.6  0.3 - 6.2 % Final    Basophil % 09/11/2024 0.6  0.0 - 1.5 % Final    Immature Grans % 09/11/2024 0.3  0.0 - 0.5 % Final    Neutrophils, Absolute 09/11/2024 3.70  1.70 - 7.00 10*3/mm3 Final    Lymphocytes, Absolute 09/11/2024 2.12  0.70 - 3.10 10*3/mm3 Final    Monocytes, Absolute 09/11/2024 0.54  0.10 - 0.90 10*3/mm3 Final    Eosinophils, Absolute 09/11/2024 0.17  0.00 - 0.40 10*3/mm3 Final    Basophils, Absolute 09/11/2024 0.04  0.00 - 0.20 10*3/mm3 Final    Immature Grans, Absolute 09/11/2024 0.02  0.00 - 0.05 10*3/mm3 Final    nRBC 09/11/2024 0.0  0.0 - 0.2 /100 WBC Final     ASSESSMENT:  Patient is a 79-year-old male with medical history significant for ankylosing spondylitis, on methotrexate at least since 2015, recent prolonged hospitalization (June 2024) for ESBL UTI and bacteremia comes for anemia evaluation and management.    # Normocytic anemia:  On review of chart, patient's baseline hemoglobin appears to be around 10-11 g/dL since early 2024.  Normal MCV around 85.  Normal WBC and slightly elevated platelet count.  Workup performed in early August 2024 noted iron 14, ferritin 530, iron saturation 8% and TIBC 180.  Soluble transferrin receptor normal at 20.7.  This is most consistent with anemia of chronic disease.    Normal folic acid and vitamin B12 levels.  SPEP/SUMAYA did not show any M spike.  Free light chains elevated, kappa light chain 223.7, lambda light chain 104.6 and FLC ratio 2.14.  Slightly elevated FLC ratio likely inflammatory/autoimmune condition related.    He may need a 24-hour UPEP/SUMAYA.  A stool for occult blood was  negative on 68/18/24).  This appears to be multifactorial anemia due to chronic disease ankylosing spondylitis, methotrexate use, osteomyelitis (sacral and heel ulcers) and malnutrition.  Patient was encouraged to maintain adequate nutritional intake via tube feedings.  Plan made to start him on Procrit 20,000 units every 2 weeks to keep hemoglobin > 10 g/dL.  9/11/2024: Patient returns to the clinic after recent hospitalization for worsening anemia, sacral/ischial and left heel osteomyelitis and E. coli UTI.  Recovering well.  Back to his [facility].  Labs from today show improved hemoglobin of 11.2 g/dL.  Will not need Procrit today.  I briefly reviewed his goals of care and hospice care.  Patient states he would like to continue to receive these treatments, however plans to discuss this further with his daughter.  Plan to check labs every 2 weeks and administer Procrit if hemoglobin <10 g/dL..  Follow-up in 4 months with NP.  Transfuse to keep hemoglobin > 7    # Dysphagia: On nocturnal tube feedings    # Urinary retention: Has chronic Mariscal's catheter.    # Ankylosing spondylitis: On weekly methotrexate per rheumatology      PLAN:   -Likely anemia of chronic disease due to ankylosing spondylitis and methotrexate use  -Receiving treatment with Procrit 20,000 units every 2 weeks  -No Procrit today.    - Repeat labs every 2 weeks and administer Procrit if hemoglobin <10 g/dL  -Transfuse to keep hemoglobin > 7  -Maintain and improve nutritional intake  -Follow-up in 4 months or sooner if needed    Orders Placed This Encounter   Procedures    CBC and Differential     Standing Status:   Future     Number of Occurrences:   1     Standing Expiration Date:   9/11/2025     Order Specific Question:   Manual Differential     Answer:   No     Order Specific Question:   Release to patient     Answer:   Routine Release [[id number]]   Total time spent during this patient encounter is 42 minutes. The total  time spent with the patient includes: preparing to see the patient by reviewing of tests, prior notes or other relevant information, performing appropriate independent examination & evaluation, counseling, ordering of medications, tests or procedures, documenting clinic information in the electronic medical records or other health records, independently interpreting results of tests and communicating the results to the patient/family or caregiver.

## 2024-09-16 NOTE — Clinical Note
Level of Care: Critical Care [6]   Diagnosis: Sepsis [[id number]]   Admitting Physician: EDUAR BUCKLEY [[id number]]   Certification: I Certify That Inpatient Hospital Services Are Medically Necessary For Greater Than 2 Midnights

## 2024-09-17 PROBLEM — R65.21 SEPTIC SHOCK: Status: ACTIVE | Noted: 2024-09-17

## 2024-09-17 PROBLEM — A41.9 SEPTIC SHOCK: Status: ACTIVE | Noted: 2024-09-17

## 2024-09-17 PROBLEM — A41.9 SEPSIS: Status: ACTIVE | Noted: 2024-01-01

## 2024-09-17 NOTE — PROGRESS NOTES
[facility] Clinical Pharmacy Services: Vancomycin Pharmacokinetic Initial Consult Note    Anthony Gallegos is a 79 y.o. male who is on day 1 of pharmacy to dose vancomycin.    Indication: Pneumonia and Skin and Soft Tissue  Consulting Provider: Dr. Vegas  Planned Duration of Therapy: 7 days  Loading Dose Ordered or Given: 1250 mg on 9/17 at 0811  MRSA PCR performed: Ordered; Result: Awaiting  Target: Dose by Levels  Other Antimicrobials: Meropenem    Vitals/Labs  Ht:  ; Wt: 68 kg (149 lb 14.6 oz)  Temp Readings from Last 1 Encounters:   09/16/24 97.8 °F (36.6 °C)    Estimated Creatinine Clearance: 67.5 mL/min (by C-G formula based on SCr of 0.84 mg/dL).       Results from last 7 days   Lab Units 09/17/24  0516 09/16/24  2357 09/11/24  0921   CREATININE mg/dL 0.84 1.06  --    WBC 10*3/mm3  --  6.51 6.59     Assessment/Plan:    Vancomycin Dose:  Intermittent dosing for now given that patient's SCr is still double from baseline  Vanc Random has been ordered for 9/18 with AM labs    Pharmacy will follow patient's kidney function and will adjust doses and obtain levels as necessary. Thank you for involving pharmacy in this patient's care. Please contact pharmacy with any questions or concerns.                           Shaylee Childress, PharmD, BCPS, [facility]  Clinical Pharmacy Specialist, Emergency Medicine   Phone: [phone]

## 2024-09-17 NOTE — PROGRESS NOTES
Clinical Pharmacy Services: Medication History    Anthony Gallegos is a 79 y.o. male presenting to [facility] for   Chief Complaint   Patient presents with    Shortness of Breath       He  has a past medical history of Abscess of scrotum, MARTITA (acute kidney injury), Altered mental state, Arthritis, AS (ankylosing spondylitis), History of MRSA infection, Hypertension, Leukocytosis, Tetrahydrocannabinol (THC) use disorder, mild, abuse (12/20/2023), and Uveitis.    Allergies as of 09/16/2024    (No Known Allergies)       Medication information was obtained from: Nursing Home   Pharmacy and Phone Number:     Prior to Admission Medications       Prescriptions Last Dose Informant Patient Reported? Taking?    Ascorbic Acid (VITAMIN C PO) 9/16/2024 Nursing Home Yes Yes    Take 500 mg by mouth Daily.    atorvastatin (LIPITOR) 40 MG tablet 9/16/2024 [facility] Yes Yes    Take 1 tablet by mouth Every Night.    bisacodyl (DULCOLAX) 10 MG suppository  Nursing Home No Yes    Insert 1 suppository into the rectum Daily As Needed for Constipation (Use if bisacodyl oral is ineffective).    bisacodyl (DULCOLAX) 5 MG EC tablet  Nursing Home No Yes    Take 1 tablet by mouth Daily As Needed for Constipation (Use if polyethylene glycol is ineffective).    calcium carbonate (TUMS) 500 MG chewable tablet  Nursing Home No Yes    Chew 2 tablets 2 (Two) Times a Day As Needed for Heartburn.    collagenase 250 UNIT/GM ointment 9/16/2024 Nursing Home Yes Yes    Apply 1 Application topically to the appropriate area as directed Daily.    cyclobenzaprine (FLEXERIL) 10 MG tablet  Nursing Home Yes Yes    Take 0.5 tablets by mouth At Night As Needed.    HYDROcodone-acetaminophen (NORCO) 5-325 MG per tablet 9/16/2024 [facility] Yes Yes    Take 1 tablet by mouth 3 (Three) Times a Day As Needed.    lansoprazole (PREVACID SOLUTAB) 30 MG Tablet Delayed Release Dispersible disintegrating tablet 9/16/2024 Nursing Home No Yes    Administer 1  tablet per G tube Every Morning.    levothyroxine (SYNTHROID, LEVOTHROID) 25 MCG tablet 9/16/2024 [facility] Yes Yes    Take 1 tablet by mouth Daily.    melatonin 3 MG tablet  Nursing Home No Yes    Take 1 tablet by mouth At Night As Needed for Sleep.    methotrexate 2.5 MG tablet Past Week Nursing Home No Yes    Take 1 tablet by mouth 1 (One) Time Per Week. Take 2.5 mg on Wednesday and 2.5 mg on Thursday.  Do not take any medication on Friday through Tuesday.    Patient taking differently:  Take 1 tablet by mouth 2 (Two) Times a Week. Take 2.5 mg on Wednesday and 2.5 mg on Thursday.  Do not take any medication on Friday through Tuesday.    metoprolol succinate XL (TOPROL-XL) 25 MG 24 hr tablet 9/16/2024 [facility] No Yes    Take 1 tablet by mouth Daily.    polyethylene glycol (MIRALAX) 17 g packet  Nursing Home No Yes    Take 17 g by mouth Daily As Needed (Use if senna-docusate is ineffective).    Potassium Bicarb-Citric Acid (Effer-K) 20 MEQ effervescent tablet 9/16/2024 [facility] Yes Yes    Take 1 tablet by mouth Daily.    potassium chloride (KAYCIEL) 20 mEq/15 mL solution 9/16/2024 [facility] Yes Yes    Take 15 mL by mouth Daily.    sennosides-docusate (PERICOLACE) 8.6-50 MG per tablet 9/16/2024 [facility] No Yes    Take 2 tablets by mouth 2 (Two) Times a Day.    sodium hypochlorite (DAKIN'S 1/4 STRENGTH) 0.125 % solution topical solution 0.125% 9/16/2024 [facility] Yes Yes    Apply  topically to the appropriate area as directed 3 times a day.    terazosin (HYTRIN) 1 MG capsule 9/16/2024 [facility] No Yes    Administer 1 capsule per G tube Every Night.    Patient taking differently:  Take 1 capsule by mouth Every Night.    Wound Dressings (Medihoney Wound/Burn Dressing) Paste 9/16/2024 [facility] Yes Yes    Apply 1 Application topically to the appropriate area as directed Daily.              Medication notes:     This medication list is complete to the best of my knowledge as of  9/17/2024    Please call if questions.    Erik Ham  Medication History Technician  [phone]    9/17/2024 09:50 EDT

## 2024-09-17 NOTE — PROGRESS NOTES
LOS: 0 days   Patient Care Team:  Keshav Eason MD as PCP - General (Internal Medicine)  Placido Shirley APRN as Referring Physician (Gastroenterology)  Rick Quarles MD as Consulting Physician (Hematology and Oncology)    Chief Complaint:  F/up respiratory failure, mechanical ventilation, critical care management, sepsis    Subjective   Interval History  I reviewed the admission note, progress notes, PMH, PSH, Family hx, social history, imagings and prior records on this admission, summarized the finding in my note and formulated a transition of care plan.      On the ventilator with AC VC 22/550.  Overbreathing the ventilator.  Flow starvation noted and dyssynchrony.  Hypotensive on pressors.  HR improved.  BP now in normal range.  No fever.  He is alert on low-dose propofol and moves extremities but does not consistently follow commands.    REVIEW OF SYSTEMS:   Cannot obtain.  Patient is on the ventilator    Ventilator/Non-Invasive Ventilation Settings (From admission, onward)       Start     Ordered    09/17/24 0356  Ventilator - Vent Mode: AC/VC; Rate: Other; Rate: 22; FiO2: Titrate Per SpO2; Titrate Oxygen for SpO2: 90 - 95%; PEEP: 5; Tidal Volume: mL; TV: 500  Continuous,   Status:  Canceled        Question Answer Comment   Vent Mode AC/VC    Rate Other    Rate 22    FiO2 Titrate Per SpO2    Titrate Oxygen for SpO2 90 - 95%    PEEP 5    Tidal Volume mL            09/17/24 0355    09/17/24 0356  Ventilator - Vent Mode: AC/VC; Rate: Other; Rate: 22; FiO2: 50%; PEEP: 5; Tidal Volume: mL; TV: 500  Continuous        Question Answer Comment   Vent Mode AC/VC    Rate Other    Rate 22    FiO2 50%    PEEP 5    Tidal Volume mL            09/17/24 0355    09/17/24 0355  Ventilator - Vent Mode: AC/VC; Rate: Other; Rate: 24; FiO2: Titrate Per SpO2; Titrate Oxygen for SpO2: 90 - 95%; PEEP: 5; Tidal Volume: mL; TV: 500  Continuous,   Status:  Canceled         Question Answer Comment   Vent Mode AC/VC    Rate Other    Rate 24    FiO2 Titrate Per SpO2    Titrate Oxygen for SpO2 90 - 95%    PEEP 5    Tidal Volume mL            09/17/24 0354    09/17/24 0225  Ventilator - Vent Mode: AC/VC; Rate: 20; FiO2: Titrate Per SpO2; Titrate Oxygen for SpO2: 90 - 95%; PEEP: 5; Tidal Volume: mL; TV: 500  Continuous,   Status:  Canceled        Question Answer Comment   Vent Mode AC/VC    Rate 20    FiO2 Titrate Per SpO2    Titrate Oxygen for SpO2 90 - 95%    PEEP 5    Tidal Volume mL            09/17/24 0224                          Physical Exam:     Vital Signs  Temp:  [97.8 °F (36.6 °C)-98.4 °F (36.9 °C)] 98.1 °F (36.7 °C)  Heart Rate:  [] 92  Resp:  [22-30] 22  BP: ()/(35-84) 97/70  FiO2 (%):  [50 %-100 %] 50 %    Intake/Output Summary (Last 24 hours) at 9/17/2024 1250  Last data filed at 9/17/2024 1141  Gross per 24 hour   Intake 968.75 ml   Output 700 ml   Net 268.75 ml     Flowsheet Rows      Flowsheet Row First Filed Value   Admission Height --   Admission Weight 68 kg (149 lb 14.6 oz) Documented at 09/16/2024 2100            PPE used per hospital policy    General Appearance:  On the ventilator.  Alert.  Cachectic.   ENMT: ET tube noted.  Extremities normal.   Eyes:  Pupils equal and reactive to light. EOMI   Neck:   Trachea midline. No thyromegaly.   Lungs:   Coarse anteriorly.  No wheezing.  Mild crackles    Heart:   Regular rhythm and normal rate, normal S1 and S2, no         murmur   Skin:   No rash or ecchymosis   Abdomen:   PEG tube noted.  Soft. No tenderness. No HSM.   Neuro/psych:  Conscious, alert.  Moves all extremities to stimulus but weak all over.  Exam done when he was on low-dose propofol.   Extremities:  No cyanosis, clubbing or edema.  Warm extremities and well-perfused.  Loss of muscle mass in arms and legs.          Results Review:        Results from last 7 days   Lab Units 09/17/24  0516 09/16/24  2357   SODIUM mmol/L 132* 137    POTASSIUM mmol/L 5.3* 4.8   CHLORIDE mmol/L 104 101   CO2 mmol/L 17.0* 23.0   BUN mg/dL 23 24*   CREATININE mg/dL 0.84 1.06   GLUCOSE mg/dL 84 87   CALCIUM mg/dL 8.3* 9.0     Results from last 7 days   Lab Units 09/17/24  0516 09/16/24  2357   HSTROP T ng/L 50* 85*     Results from last 7 days   Lab Units 09/17/24  1127 09/16/24  2357 09/11/24  0921   WBC 10*3/mm3 14.72* 6.51 6.59   HEMOGLOBIN g/dL 9.3* 9.7* 11.2*   HEMATOCRIT % 29.3* 30.4* 37.3*   PLATELETS 10*3/mm3 267 345 369                           Results from last 7 days   Lab Units 09/17/24  0641 09/17/24  0339 09/17/24  0009   PH, ARTERIAL pH units 7.428 7.243* 7.295*   PCO2, ARTERIAL mm Hg 37.8 64.2* 45.1*   PO2 ART mm Hg 85.5 228.6* 68.0*   O2 SATURATION ART % 96.8 99.7* 91.0*   FLOW RATE lpm  --   --  15.0000   MODALITY  Adult Vent Adult Vent NRB         I reviewed the patient's new clinical results.        Medication Review:   atorvastatin, 40 mg, Per PEG Tube, Daily  chlorhexidine, 15 mL, Mouth/Throat, Q12H  fentaNYL citrate (PF), 100 mcg, Intravenous, Once  guaifenesin, 200 mg, Per PEG Tube, Q6H  ipratropium-albuterol, 3 mL, Nebulization, 4x Daily - RT  levothyroxine, 25 mcg, Per PEG Tube, Q AM  meropenem, 1,000 mg, Intravenous, Q8H  pantoprazole, 40 mg, Intravenous, Q24H  senna-docusate sodium, 2 tablet, Oral, BID  Vancomycin Pharmacy Intermittent/Pulse Dosing, , Does not apply, Daily        norepinephrine, 0.02-0.3 mcg/kg/min, Last Rate: 0.3 mcg/kg/min (09/17/24 1152)  Pharmacy to dose vancomycin,   propofol, 5-50 mcg/kg/min, Last Rate: 10 mcg/kg/min (09/17/24 0948)  sodium chloride, 125 mL/hr, Last Rate: 125 mL/hr (09/17/24 0634)  vasopressin, 0.03 Units/min, Last Rate: Stopped (09/17/24 1115)        Diagnostic imaging:  I personally and independently reviewed the following images:  CXR 9/17/2024: Consolidation in the left lung.  Patchy infiltrates on the right.    Assessment     Septic shock, POA  Bilateral pneumonia  Acute hypoxemic and  hypercapnic respiratory failure, requiring mechanical ventilation 9/16  Metabolic encephalopathy  Hypotension  Elevated troponin  Severe protein calorie malnutrition/hypoalbuminemia (albumin 2)  Slightly elevated troponin, probably non-STEMI type II  Positive MRSA screen  Mild hyperkalemia    Chronic anemia  Ankylosing spondylitis of cervical spine, on methotrexate  BPH  Chronic indwelling Mariscal catheter  HTN  Dysphagia with PEG tube  Failure to thrive  Hx osteomyelitis left calcaneus (culture 8/20 positive for Proteus mirabilis, Pseudomonas and E. Coli)  Urine culture positive for ESBL 8/17/2024  Pressure ulcers: Left calcaneal, sacrum, ischial.  POA.  Debrided 8/20/2024  History of ESBL bacteremia 6/18/2024  Hypothyroidism    All problems new to me    Plan     Mechanical ventilation management: Due to dyssynchrony and flow starvation, I switched him from AC/VC to AC PC 16/15.  Continue mechanical ventilation for now.  When he is hemodynamically stable and his mental status improved then we could initiate weaning.  Check sputum culture  Ventilator bundle.  ICU core measures: Initiate PPI prophylaxis.  DVT prophylaxis with Lovenox.  Sedation with propofol.  Titrate for RASS -1-0  Analgesia with fentanyl as needed.  Started fentanyl 50 mcg every hour as needed.  Initiate tube feeding.  Bowel regimen as needed.  Pressors with Levophed and vasopressin.  Titrate to keep MAP >65.  Administer sepsis fluid bolus.  Antibiotics with meropenem and vancomycin pending cultures      Margaret Carrizales MD  09/17/24  12:50 EDT        Time: Critical care 45 min      This note was dictated utilizing Dragon dictation

## 2024-09-17 NOTE — ED TRIAGE NOTES
Pt to er via ems from [facility] called for ams. Pt normally a/ox4 per staff. Pt responds to pain only at this time. Pt   hypotensive en route. Pt o2 70% on RA upon ems arrival. Unknown lkw.

## 2024-09-17 NOTE — PROGRESS NOTES
[facility] Clinical Pharmacy Services: Enoxaparin Consult    Anthony Gallegos has a pharmacy consult to dose prophylactic enoxaparin per Dr. Margaret Carrizales's request.     Indication: VTE Prophylaxis  Home Anticoagulation: N/A     Relevant clinical data and objective history reviewed:  79 y.o. male   68 kg (149 lb 14.6 oz)   Body mass index is 18.74 kg/m².   Results from last 7 days   Lab Units 09/17/24  1127   PLATELETS 10*3/mm3 267     Estimated Creatinine Clearance: 67.5 mL/min (by C-G formula based on SCr of 0.84 mg/dL).    Assessment/Plan    Will start patient on 40mg subcutaneous every 24 hours, adjusted for renal function. Consult order will be discontinued but pharmacy will continue to follow.     Cecil Umaña formerly [facility]  Clinical Pharmacist

## 2024-09-17 NOTE — H&P
Group: [facility]         History and Physical    Patient Identification:  Anthony Gallegos  79 y.o.  male  1945  [id number]            Requesting physician: Dr Crooks    Reason for Consultation: ICU admission    CC: Sepsis, respiratory failure, hypotension    History of Present Illness:  Anthony Gallegos is an 79 year old male with past medical history ankylosing spondylitis, failure to thrive, chronic decubitus wounds, chronic dysphagia with PEG tube, chronic anemia, HTN wo presented to the ED from nursing facility via EMS.  Per nursing staff reports patient is normally alert and oriented x 4, tonight patient was found to be with altered mental status only responding to pain with unknown last known normal.  When EMS arrived patient noted to be hypoxic with SpO2 70% on room air.  He was placed on nonrebreather with some improvement in SpO2 but upon arrival to ED was noted to be hypotensive with BP 59/36, heart rate 126, tachypneic.  Patient required intubation in the ED, upon intubation per ED MD reported tube feed like material that was suctioned from airway.  Chest x-ray revealed extensive pulmonary infiltrate.  PEG tube was placed to low wall suction.  Labs revealed lactate 6.3, procalcitonin 68.40, hemoglobin 9.7, albumin 2.3, troponin 85.  Patient was given sepsis fluid bolus with 3L IVF, patient still hypotensive so was started on norepinephrine and vasopressin.  We have been asked to admit patient to critical care for further management.    Recently inpatient at [facility] from 8/17/2024-8/28/2024, was admitted and treated for multiple decubitus wounds requiring debridement of sacral and left foot wounds, osteomyelitis, UTI, failure to thrive, acute on chronic anemia.  Podiatry/ID/General surgery felt his chronic wounds/osteomyelitis are not curable and palliative wound care was recommended. [facility] was ultimately consulted and he was discharged back to nursing facility with [facility] services to  follow.    Patient's daughter (medical POA) was contacted per ED MD who stated palliative care had been following at nursing facility since discharge home but she requested full measures at this time.    Review of Systems  Unable r/t intubated; sedated    Past Medical History:  Past Medical History:   Diagnosis Date    Abscess of scrotum     MARTITA (acute kidney injury)     Altered mental state     Arthritis     AS (ankylosing spondylitis)     History of MRSA infection     Hypertension     Leukocytosis     Tetrahydrocannabinol (THC) use disorder, mild, abuse 12/20/2023    Uveitis        Past Surgical History:  Past Surgical History:   Procedure Laterality Date    COLONOSCOPY      ENDOSCOPY W/ PEG TUBE PLACEMENT N/A 03/29/2024    Procedure: ESOPHAGOGASTRODUODENOSCOPY WITH PERCUTANEOUS ENDOSCOPIC GASTROSTOMY TUBE INSERTION;  Surgeon: Khadar Broderick MD;  Location: [facility] ENDOSCOPY;  Service: General;  Laterality: N/A;  PRE/POST - DYSPHAGIA    ROTATOR CUFF REPAIR Right     TOTAL HIP ARTHROPLASTY Left 2014    UPPER GASTROINTESTINAL ENDOSCOPY      WOUND DEBRIDEMENT N/A 08/20/2024    Procedure: DEBRIDEMENT SACRAL ULCER/WOUND;  Surgeon: Justine Roque MD;  Location: [facility] MAIN OR;  Service: General;  Laterality: N/A;    WOUND DEBRIDEMENT Left 08/20/2024    Procedure: LEFT DEBRIDEMENT FOOT;  Surgeon: Renny Duval DPM;  Location: [facility] OR;  Service: Podiatry;  Laterality: Left;        Home Meds:  (Not in a hospital admission)      Allergies:  No Known Allergies    Social History:   Social History     Socioeconomic History    Marital status:     Number of children: 2   Tobacco Use    Smoking status: Never    Smokeless tobacco: Never   Vaping Use    Vaping status: Never Used   Substance and Sexual Activity    Alcohol use: No    Drug use: No    Sexual activity: Defer       Family History:  Family History   Problem Relation Age of Onset    Alzheimer's disease Mother     Colon cancer Neg Hx     Colon  polyps Neg Hx     Crohn's disease Neg Hx     Irritable bowel syndrome Neg Hx     Ulcerative colitis Neg Hx     Malig Hyperthermia Neg Hx        Physical Exam:  /69 (BP Location: Right arm, Patient Position: Lying)   Pulse 92   Temp 97.8 °F (36.6 °C)   Resp 24   Wt 68 kg (149 lb 14.6 oz)   SpO2 (!) 89%   BMI 18.74 kg/m²  Body mass index is 18.74 kg/m². (!) 89% 68 kg (149 lb 14.6 oz)    Physical Exam  Constitutional: Elderly male lying in bed, intubated, synchronous with ventilator, not on any sedation currently upon exam  Head: NCAT  Eyes: No pallor, anicteric conjunctiva, pupils 3 equal round brisk bilaterally  ENMT:  ETT to vent.  No oral thrush. Dry MM.   NECK: Trachea midline, no thyromegaly  Heart: RRR, no pedal edema  Lungs: GINO +, clear to diminished bilaterally, no wheezing or crackles   Abdomen: Soft, nondistended.  No tenderness, guarding or rigidity. No palpable masses.  Gtube site c/d; gtube connected to LWS.  Mariscal catheter to bsdg clear yellow urine in bag.  Sacral, gluteal pressure wounds  Extremities: Extremities warm and well perfused. No cyanosis/ clubbing.  All pulses palpable.  Skin warm and dry.  Left heel wound wrapped with kerlix  Neuro: Unresponsive s/p etomidate, fentanyl, rocuronium.  Synchrynous with vent.  No extremity movement spontaneously or to pain  Psych: Unable to assess    PPE recommended per [facility] infectious disease Isolation protocol for the current clinical scenario(as mentioned below) was followed.    LABS:  COVID19   Date Value Ref Range Status   03/25/2024 Not Detected Not Detected - Ref. Range Final       Lab Results   Component Value Date    CALCIUM 9.0 09/16/2024    PHOS 2.9 08/27/2024     Results from last 7 days   Lab Units 09/16/24  3979 09/11/24  0966   SODIUM mmol/L 137  --    POTASSIUM mmol/L 4.8  --    CHLORIDE mmol/L 101  --    CO2 mmol/L 23.0  --    BUN mg/dL 24*  --    CREATININE mg/dL 1.06  --    GLUCOSE mg/dL 87  --    CALCIUM mg/dL 9.0   --    WBC 10*3/mm3 6.51 6.59   HEMOGLOBIN g/dL 9.7* 11.2*   PLATELETS 10*3/mm3 345 369   ALT (SGPT) U/L 24  --    AST (SGOT) U/L 27  --      Lab Results   Component Value Date    CKTOTAL 29 03/25/2024    TROPONINT 85 (C) 09/16/2024     Results from last 7 days   Lab Units 09/16/24  2357   HSTROP T ng/L 85*         Results from last 7 days   Lab Units 09/16/24  2357   LACTATE mmol/L 6.3*     Results from last 7 days   Lab Units 09/17/24  0009   PH, ARTERIAL pH units 7.295*   PCO2, ARTERIAL mm Hg 45.1*   PO2 ART mm Hg 68.0*   O2 SATURATION ART % 91.0*   FLOW RATE lpm 15.0000   MODALITY  NRB                 Lab Results   Component Value Date    TSH 6.800 (H) 08/17/2024     Estimated Creatinine Clearance: 53.5 mL/min (by C-G formula based on SCr of 1.06 mg/dL).         Imaging: I personally visualized the images of scans/x-rays performed within last 3 days.      Assessment:  Severe sepsis  Acute hypoxemic respiratory failure  AMS  Hypotension  Elevated troponin  Hypoalbuminemia  Chronic anemia  Ankylosing spondylitis of cervical spine, on methotrexate  BPH  Chronic indwelling Mariscal catheter  HTN  Dysphagia with PEG tube  Failure to thrive  Hx osteomyelitis left calcaneus  Hypothyroidism    Recommendations:   Severe sepsis/AMS  -Initial lactate 6.3; given sepsis IVF bolus.  Start continuous IVF, trend lactate  -Blood cultures pending, get sputum culture, s pneumo/legionella pcr's  -CXR reviewed, appears with multifocal infiltrate likely aspiration pneumonia; consolidation left lung field, reports of tube feed suctioned from airways during intubation.  He is afebrile  -Pt with history of multidrug resistant organisms, last admission wound cultures from foot and sacrum with E. Coli, proteus, pseudomonas.  Coccyx, left gluteal, left heel wounds appear with mostly granulating tissue; no overt infected exudate upon my exam.  UA negative for bacteria, nitrite neg, UA culture pending  -Continue Meropenem, will consult ID for  further recommendations as they followed patient during last admission.  -CT head without contrast pending to r/o other causes of AMS    2.  Acute hypoxemic respiratory failure  -Post intubation CXR reviewed; ETT good position  -No post intubation ABG has been obtained yet, stat ABG now and adjust vent as needed  -Repeat ABG, CXR AM  -RVP negative  -Scheduled bronchodilator, mucinex    3.  Hypotension  -Hold home BP medications  -Receiving last of IVF bolus upon my exam, starting continuous IVF.  Currently requiring Levo and Vaso.  Has good peripheral access 2 18ga's bilaterally, if unable to titrate vaso off soon will need central line placement    4.  Elevated troponin  -Initial trop 85, EKG with borderline ST elevation possibly due to wandering baseline  -Trend troponin, repeat EKG AM  -Consider cardiology consult    5.  Chronic dysphagia with PEG tube/hypoalbuminemia/failure to thrive  -PEG tube to gravity for now, may give meds only and clamp x 30 minutes  -Was seen by speech therapy last admission who recommended pureed, honey thickened liquids and was receiving nocturnal tube feeding  -Once extubated will need to be re-evaluated by speech therapy  -Abd seems to be somewhat firm to palpation on exam, will get KUB he does have history of constipation (bowel reg has been ordered)  -Failure to thrive; has lost 35# in the last 4 months    6.  Chronic pressure wounds/Hx left calcaneal osteomyelitis  -Pt with sacral, ischial, left calcaneal pressure wounds.  Consult wound care to see  -Sacral/ischial/heel wounds debrided by gen surgery and podiatry 8/20/2024. Had wound vac to heel and sacrum during last admission but this was discontinued as pt was discharged with [facility]    7.  Chronic indwelling forde catheter/BPH  -History of urine outflow obstruction requiring chronic forde; new forde catheter placed in ED, urine clear yellow  -Hx ESBL Ecoli UTI    8.  Anemia  -Appears chronic, follows with Hematology, was  receiving procrit for anemia of chronic disease  -Hgb stable, monitor, no s/s bleeding    9.  Ankylosing spondylitis  -Hold methotrexate for now    10.  Hypothyroidism  -Continue levothyroxine, just started levothyroxine during last admission, f/u TSH as outpatient    VTE prophy: SCDs  GI prophy: PPI  Accucheck q 6  Full Code  Palliative care consult    Aissatou Tesfaye, APRN  9/17/2024  01:06 EDT      Much of this encounter note is an electronic transcription/translation of spoken language to printed text using Dragon Software.

## 2024-09-17 NOTE — ED PROVIDER NOTES
EMERGENCY DEPARTMENT ENCOUNTER  Room Number:  16/16  PCP: Keshav Eason MD  Independent Historians: EMS      HPI:  Chief Complaint: had concerns including Shortness of Breath.     A complete HPI/ROS/PMH/PSH/SH/FH are unobtainable due to: Altered mental status      Context: Anthony Galleogs is a 79 y.o. male with a medical history of ankylosing spondylitis, chronic anemia, sacral and heel wounds with osteomyelitis, dysphagia with PEG tube dependency, BPH with chronic indwelling Mariscal catheter, who presents to the ED c/o acute altered mental status, shortness of breath, hypotension.  He presents from nursing facility.  According to EMS patient is a full code.  He was placed on nonrebreather prior to arrival.  Blood glucose 98 on arrival, blood pressure 59/36.  Patient is unable to provide me any history.  No reported fever per EMS.  Last known normal is unclear.      Review of prior external notes (non-ED) -and- Review of prior external test results outside of this encounter: See ED course below for summary of recent discharge summary and also recent culture information.        PAST MEDICAL HISTORY  Active Ambulatory Problems     Diagnosis Date Noted    Ankylosing spondylitis 06/29/2017    Recent unexplained weight loss 07/14/2017    Abnormal serum lipase level 08/10/2017    Abnormal serum level of amylase 08/10/2017    Essential hypertension 10/19/2017    Boil 03/22/2018    Immobility 12/20/2023    Fall from bed 12/20/2023    Tetrahydrocannabinol (THC) use disorder, mild, abuse 12/20/2023    Generalized pain 12/20/2023     12/20/2023    Grief 12/20/2023    DISH (disseminated idiopathic skeletal hyperostosis) 12/20/2023    Generalized weakness 12/21/2023    Severe malnutrition 12/24/2023    Altered mental status 01/21/2024    Transient alteration of awareness 01/22/2024    GERD without esophagitis 01/22/2024    Enlarged prostate 01/22/2024    UTI (urinary tract infection) 01/22/2024    Hyperglycemia  01/22/2024    Decreased oral intake 03/25/2024    Dysphagia 03/25/2024    Failure to thrive in adult 04/09/2024    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    Renal failure 06/18/2024    UTI (urinary tract infection), bacterial 06/18/2024    Anemia 06/27/2024    Acute on chronic anemia 08/17/2024    Elevated liver function tests 08/17/2024    Indwelling Mariscal catheter present 08/17/2024    Thyroid function test abnormal 08/17/2024    Dysphagia 08/17/2024    Malnutrition 08/17/2024    Feeding by G-tube 08/17/2024    Hyperlipidemia 08/17/2024    Hypothyroidism (acquired) 08/18/2024    Sacral wound 08/17/2024     Resolved Ambulatory Problems     Diagnosis Date Noted    Urine retention 12/20/2023    Constipation 12/20/2023    Leukocytosis 01/22/2024    Dehydration 01/22/2024    MARTITA (acute kidney injury) 01/22/2024    Altered mental state 01/22/2024    Encephalopathy 03/26/2024    ARF (acute renal failure) 06/18/2024     Past Medical History:   Diagnosis Date    Abscess of scrotum     Arthritis     AS (ankylosing spondylitis)     History of MRSA infection     Hypertension     Uveitis          PAST SURGICAL HISTORY  Past Surgical History:   Procedure Laterality Date    COLONOSCOPY      ENDOSCOPY W/ PEG TUBE PLACEMENT N/A 03/29/2024    Procedure: ESOPHAGOGASTRODUODENOSCOPY WITH PERCUTANEOUS ENDOSCOPIC GASTROSTOMY TUBE INSERTION;  Surgeon: Khadar Broderick MD;  Location: [facility] ENDOSCOPY;  Service: General;  Laterality: N/A;  PRE/POST - DYSPHAGIA    ROTATOR CUFF REPAIR Right     TOTAL HIP ARTHROPLASTY Left 2014    UPPER GASTROINTESTINAL ENDOSCOPY      WOUND DEBRIDEMENT N/A 08/20/2024    Procedure: DEBRIDEMENT SACRAL ULCER/WOUND;  Surgeon: Justine Roque MD;  Location: [facility] OR;  Service: General;  Laterality: N/A;    WOUND DEBRIDEMENT Left 08/20/2024    Procedure: LEFT DEBRIDEMENT FOOT;  Surgeon: Renny Duval DPM;  Location: [facility] OR;  Service: Podiatry;  Laterality: Left;         FAMILY  HISTORY  Family History   Problem Relation Age of Onset    Alzheimer's disease Mother     Colon cancer Neg Hx     Colon polyps Neg Hx     Crohn's disease Neg Hx     Irritable bowel syndrome Neg Hx     Ulcerative colitis Neg Hx     Malig Hyperthermia Neg Hx          SOCIAL HISTORY  Social History     Socioeconomic History    Marital status:     Number of children: 2   Tobacco Use    Smoking status: Never    Smokeless tobacco: Never   Vaping Use    Vaping status: Never Used   Substance and Sexual Activity    Alcohol use: No    Drug use: No    Sexual activity: Defer         ALLERGIES  Patient has no known allergies.      REVIEW OF SYSTEMS  Unable to perform due to altered mental status      PHYSICAL EXAM    I have reviewed the triage vital signs and nursing notes.    ED Triage Vitals   Temp Heart Rate Resp BP SpO2   09/16/24 2308 09/16/24 2259 09/16/24 2259 09/16/24 2259 09/16/24 2259   97.8 °F (36.6 °C) (!) 126 (!) 30 (!) 59/36 93 %      Temp src Heart Rate Source Patient Position BP Location FiO2 (%)   -- -- -- -- --                GENERAL: awake, chronically ill-appearing, malnourished, mostly moans incomprehensible sounds but does follow some simple commands  HENT: Normocephalic, atraumatic, dry mucous membranes  EYES: no scleral icterus, eyes sunken  CV: regular rhythm, regular rate  RESPIRATORY: Mild tachypnea, lungs clear to auscultation bilaterally  ABDOMEN: soft, nonrigid, nontender throughout, PEG tube present  : Mariscal catheter in place with chronic ulceration of the urethra inferiorly along the shaft of the penis  MUSCULOSKELETAL: no deformity  NEURO: Awake, answers yes in response to his name but otherwise is nonverbal and unable to provide any meaningful history.  SKIN: Warm, dry, large wound on the left heel pink moist granulation tissue          LAB RESULTS  Recent Results (from the past 24 hour(s))   ECG 12 Lead Altered Mental Status    Collection Time: 09/16/24 11:00 PM   Result Value Ref  Range    QT Interval 318 ms    QTC Interval 444 ms   POC Glucose Once    Collection Time: 09/16/24 11:02 PM    Specimen: Blood   Result Value Ref Range    Glucose 98 70 - 130 mg/dL   Comprehensive Metabolic Panel    Collection Time: 09/16/24 11:57 PM    Specimen: Blood   Result Value Ref Range    Glucose 87 65 - 99 mg/dL    BUN 24 (H) 8 - 23 mg/dL    Creatinine 1.06 0.76 - 1.27 mg/dL    Sodium 137 136 - 145 mmol/L    Potassium 4.8 3.5 - 5.2 mmol/L    Chloride 101 98 - 107 mmol/L    CO2 23.0 22.0 - 29.0 mmol/L    Calcium 9.0 8.6 - 10.5 mg/dL    Total Protein 7.1 6.0 - 8.5 g/dL    Albumin 2.3 (L) 3.5 - 5.2 g/dL    ALT (SGPT) 24 1 - 41 U/L    AST (SGOT) 27 1 - 40 U/L    Alkaline Phosphatase 70 39 - 117 U/L    Total Bilirubin 0.4 0.0 - 1.2 mg/dL    Globulin 4.8 gm/dL    A/G Ratio 0.5 g/dL    BUN/Creatinine Ratio 22.6 7.0 - 25.0    Anion Gap 13.0 5.0 - 15.0 mmol/L    eGFR 70.9 >60.0 mL/min/1.73   Lipase    Collection Time: 09/16/24 11:57 PM    Specimen: Blood   Result Value Ref Range    Lipase 8 (L) 13 - 60 U/L   Procalcitonin    Collection Time: 09/16/24 11:57 PM    Specimen: Blood   Result Value Ref Range    Procalcitonin 68.40 (H) 0.00 - 0.25 ng/mL   High Sensitivity Troponin T    Collection Time: 09/16/24 11:57 PM    Specimen: Blood   Result Value Ref Range    HS Troponin T 85 (C) <22 ng/L   CBC Auto Differential    Collection Time: 09/16/24 11:57 PM    Specimen: Blood   Result Value Ref Range    WBC 6.51 3.40 - 10.80 10*3/mm3    RBC 3.55 (L) 4.14 - 5.80 10*6/mm3    Hemoglobin 9.7 (L) 13.0 - 17.7 g/dL    Hematocrit 30.4 (L) 37.5 - 51.0 %    MCV 85.6 79.0 - 97.0 fL    MCH 27.3 26.6 - 33.0 pg    MCHC 31.9 31.5 - 35.7 g/dL    RDW 15.7 (H) 12.3 - 15.4 %    RDW-SD 48.8 37.0 - 54.0 fl    MPV 8.8 6.0 - 12.0 fL    Platelets 345 140 - 450 10*3/mm3    nRBC 0.0 0.0 - 0.2 /100 WBC   Lactic Acid, Plasma    Collection Time: 09/16/24 11:57 PM    Specimen: Blood   Result Value Ref Range    Lactate 6.3 (C) 0.5 - 2.0 mmol/L   Manual  Differential    Collection Time: 09/16/24 11:57 PM    Specimen: Blood   Result Value Ref Range    Neutrophil % 85.7 (H) 42.7 - 76.0 %    Lymphocyte % 5.1 (L) 19.6 - 45.3 %    Monocyte % 9.2 5.0 - 12.0 %    Eosinophil % 0.0 (L) 0.3 - 6.2 %    Basophil % 0.0 0.0 - 1.5 %    Neutrophils Absolute 5.58 1.70 - 7.00 10*3/mm3    Lymphocytes Absolute 0.33 (L) 0.70 - 3.10 10*3/mm3    Monocytes Absolute 0.60 0.10 - 0.90 10*3/mm3    Eosinophils Absolute 0.00 0.00 - 0.40 10*3/mm3    Basophils Absolute 0.00 0.00 - 0.20 10*3/mm3    Dacrocytes Mod/2+ None Seen    Poikilocytes Mod/2+ None Seen    Polychromasia Slight/1+ None Seen    Spherocytes Mod/2+ None Seen    WBC Morphology Normal Normal    Platelet Morphology Normal Normal   Blood Gas, Arterial -    Collection Time: 09/17/24 12:09 AM    Specimen: Arterial Blood   Result Value Ref Range    Site Right Radial     Tien's Test Positive     pH, Arterial 7.295 (C) 7.350 - 7.450 pH units    pCO2, Arterial 45.1 (H) 35.0 - 45.0 mm Hg    pO2, Arterial 68.0 (L) 80.0 - 100.0 mm Hg    HCO3, Arterial 21.9 (L) 22.0 - 28.0 mmol/L    Base Excess, Arterial -4.4 (L) 0.0 - 2.0 mmol/L    O2 Saturation, Arterial 91.0 (L) 92.0 - 98.5 %    CO2 Content 23.3 23 - 27 mmol/L    Barometric Pressure for Blood Gas 751.1000 mmHg    Modality NRB     Flow Rate 15.0000 lpm    Rate 21 Breaths/minute    Notified Who Chris Crooks MD     Read Back      Notified Time      Hemodilution No     Device Comment drawn@0008 Sat 94%    Urinalysis With Culture If Indicated - Urine, Catheter    Collection Time: 09/17/24  1:10 AM    Specimen: Urine, Catheter   Result Value Ref Range    Color, UA Yellow Yellow, Straw    Appearance, UA Cloudy (A) Clear    pH, UA 7.0 5.0 - 8.0    Specific Gravity, UA 1.011 1.005 - 1.030    Glucose, UA Negative Negative    Ketones, UA Negative Negative    Bilirubin, UA Negative Negative    Blood, UA Large (3+) (A) Negative    Protein,  mg/dL (2+) (A) Negative    Leuk Esterase, UA Moderate  (2+) (A) Negative    Nitrite, UA Negative Negative    Urobilinogen, UA 0.2 E.U./dL 0.2 - 1.0 E.U./dL   Urinalysis, Microscopic Only - Urine, Catheter    Collection Time: 09/17/24  1:10 AM    Specimen: Urine, Catheter   Result Value Ref Range    RBC, UA Too Numerous to Count (A) None Seen, 0-2 /HPF    WBC, UA Too Numerous to Count (A) None Seen, 0-2 /HPF    Bacteria, UA None Seen None Seen /HPF    Squamous Epithelial Cells, UA 0-2 None Seen, 0-2 /HPF    Hyaline Casts, UA Too Numerous to Count None Seen /LPF    Methodology Automated Microscopy    Respiratory Panel PCR w/COVID-19(SARS-CoV-2) [facility]/KELLE/[facility]PH In-House, NP Swab in UTM/VTM, 2 HR TAT - Swab, Nasopharynx    Collection Time: 09/17/24  1:11 AM    Specimen: Nasopharynx; Swab   Result Value Ref Range    ADENOVIRUS, PCR Not Detected Not Detected    Coronavirus 229E Not Detected Not Detected    Coronavirus HKU1 Not Detected Not Detected    Coronavirus NL63 Not Detected Not Detected    Coronavirus OC43 Not Detected Not Detected    COVID19 Not Detected Not Detected - Ref. Range    Human Metapneumovirus Not Detected Not Detected    Human Rhinovirus/Enterovirus Not Detected Not Detected    Influenza A PCR Not Detected Not Detected    Influenza B PCR Not Detected Not Detected    Parainfluenza Virus 1 Not Detected Not Detected    Parainfluenza Virus 2 Not Detected Not Detected    Parainfluenza Virus 3 Not Detected Not Detected    Parainfluenza Virus 4 Not Detected Not Detected    RSV, PCR Not Detected Not Detected    Bordetella pertussis pcr Not Detected Not Detected    Bordetella parapertussis PCR Not Detected Not Detected    Chlamydophila pneumoniae PCR Not Detected Not Detected    Mycoplasma pneumo by PCR Not Detected Not Detected   Blood Gas, Arterial -    Collection Time: 09/17/24  3:39 AM    Specimen: Arterial Blood   Result Value Ref Range    Site Right Brachial     Tien's Test N/A     pH, Arterial 7.243 (C) 7.350 - 7.450 pH units    pCO2, Arterial 64.2  (C) 35.0 - 45.0 mm Hg    pO2, Arterial 228.6 (H) 80.0 - 100.0 mm Hg    HCO3, Arterial 27.7 22.0 - 28.0 mmol/L    Base Excess, Arterial -0.5 (L) 0.0 - 2.0 mmol/L    O2 Saturation, Arterial 99.7 (H) 92.0 - 98.5 %    A-a DO2 0.3 mmHg    CO2 Content 29.7 (H) 23 - 27 mmol/L    Barometric Pressure for Blood Gas 750.0000 mmHg    Modality Adult Vent     FIO2 100 %    Ventilator Mode VC     Set Tidal Volume 500     Set Mech Resp Rate 18     Rate 18 Breaths/minute    PEEP 5     Notified Who Chris Crooks MD     Read Back Yes     Notified Time      Hemodilution No     Device Comment drawn @0338 Sat 100%     PO2/FIO2 229 0 - 500   POC Glucose Once    Collection Time: 09/17/24  4:19 AM    Specimen: Blood   Result Value Ref Range    Glucose 87 70 - 130 mg/dL   STAT Lactic Acid, Reflex    Collection Time: 09/17/24  5:16 AM    Specimen: Blood   Result Value Ref Range    Lactate 5.3 (C) 0.5 - 2.0 mmol/L   High Sensitivity Troponin T 2Hr    Collection Time: 09/17/24  5:16 AM    Specimen: Blood   Result Value Ref Range    HS Troponin T 50 (H) <22 ng/L    Troponin T Delta -35 (L) >=-4 - <+4 ng/L   Basic Metabolic Panel    Collection Time: 09/17/24  5:16 AM    Specimen: Blood   Result Value Ref Range    Glucose 84 65 - 99 mg/dL    BUN 23 8 - 23 mg/dL    Creatinine 0.84 0.76 - 1.27 mg/dL    Sodium 132 (L) 136 - 145 mmol/L    Potassium 5.3 (H) 3.5 - 5.2 mmol/L    Chloride 104 98 - 107 mmol/L    CO2 17.0 (L) 22.0 - 29.0 mmol/L    Calcium 8.3 (L) 8.6 - 10.5 mg/dL    BUN/Creatinine Ratio 27.4 (H) 7.0 - 25.0    Anion Gap 11.0 5.0 - 15.0 mmol/L    eGFR 88.2 >60.0 mL/min/1.73   Blood Gas, Arterial -    Collection Time: 09/17/24  6:41 AM    Specimen: Arterial Blood   Result Value Ref Range    Site Right Radial     Tien's Test Positive     pH, Arterial 7.428 7.350 - 7.450 pH units    pCO2, Arterial 37.8 35.0 - 45.0 mm Hg    pO2, Arterial 85.5 80.0 - 100.0 mm Hg    HCO3, Arterial 25.0 22.0 - 28.0 mmol/L    Base Excess, Arterial 0.7 0.0 - 2.0  mmol/L    O2 Saturation, Arterial 96.8 92.0 - 98.5 %    A-a DO2 0.3 mmHg    CO2 Content 26.1 23 - 27 mmol/L    Barometric Pressure for Blood Gas 751.2000 mmHg    Modality Adult Vent     FIO2 50 %    Ventilator Mode VC     Set Tidal Volume 541     Set Mech Resp Rate 22     Rate 25 Breaths/minute    PEEP 5     Hemodilution No     Device Comment 94%     PO2/FIO2 171 0 - 500       The above labs were ordered by me and independently reviewed by me.     RADIOLOGY  XR Abdomen KUB    Result Date: 9/17/2024  XR ABDOMEN KUB-  Clinical: Abdominal distention  FINDINGS: There is a greater amount of formed fecal material seen within the colon than typically seen consistent with constipation. Fecal material within the rectum appears fairly dense, probable fecal impaction. Bladder catheter in position. Nonobstructive bowel gas pattern seen. No gross indication of underlying free intraperitoneal air. G or J-tube superimposes the left mid abdomen. Remainder is unremarkable.  This report was finalized on 9/17/2024 6:36 AM by Dr. Vamsi Pettit M.D on Workstation: [id number]      XR Chest 1 View    Result Date: 9/17/2024  XR CHEST 1 VW-  Clinical: Ventilator management  COMPARISON examination 12:49 a.m., current examination 6:13 a.m.  FINDINGS: Endotracheal tube in position as before. Cardiac size within normal limits. Left lung consolidation appears similar to or slightly more pronounced compared to the previous examination. Patchy infiltrates within the right lung as before. No gross pleural effusion. No other interval change has occurred.  This report was finalized on 9/17/2024 6:32 AM by Dr. Vamsi Pettit M.D on Workstation: [id number]      CT Head Without Contrast    Result Date: 9/17/2024  Patient: JADE MARAVILLA  Time Out: 06:01 Exam(s): CT HEAD Without Contrast EXAM:   CT Head Without Intravenous Contrast CLINICAL HISTORY:    Reason for exam: AMS. AMS. TECHNIQUE:   Axial computed tomography images of the head brain  without intravenous contrast.  CTDI is 55.7 mGy and DLP is 988.9 mGy-cm.  This CT exam was performed according to the principle of ALARA (As Low As Reasonably Achievable) by using one or more of the following dose reduction techniques: automated exposure control, adjustment of the mA and or kV according to patient size, and or use of iterative reconstruction technique. COMPARISON:   CT Head dated june 18 2024 FINDINGS:   Brain:  No acute intracranial abnormality.  Consider MRI if there is further concern.  Areas of decreased attenuation in the deep cerebral white matter are consistent with small vessel ischemic degenerative changes.  The cerebral and cerebellar sulci are prominent consistent with brain atrophy.  No hemorrhage.   Ventricles:  Unremarkable.  No ventriculomegaly.   Bones joints:  Unremarkable.  No acute fracture.   Soft tissues:  Unremarkable.   Vasculature:  Atherosclerotic disease.   Sinuses:  Unremarkable as visualized.   Mastoid air cells:  Unremarkable as visualized.  No mastoid effusion. IMPRESSION:     1.  No acute intracranial abnormality.  Consider MRI if there is further concern. 2.  Small vessel ischemic degenerative changes. 3.  Cerebral and cerebellar atrophy.     Electronically signed by Landon Osborn MD on 09-17-24 at 0601    XR Chest 1 View    Result Date: 9/17/2024  Patient: JADE MARAVILLA  Time Out: 02:03 Exam(s): XR CXR 1 VIEW EXAM:   XR Chest, 1 View CLINICAL HISTORY:    Reason for exam: AMS. TECHNIQUE:   Frontal view of the chest. COMPARISON:   FINDINGS: See Impression. IMPRESSION: Similar lung opacities.    Electronically signed by Manasa Reina MD on 09-17-24 at 0203    XR Chest 1 View    Result Date: 9/17/2024  Patient: JADE MARAVILLA  Time Out: 01:41 Exam(s): XR CXR 1 VIEW EXAM:   XR Chest, 1 View CLINICAL HISTORY:    Reason for exam: s p intubation. TECHNIQUE:   Frontal view of the chest. COMPARISON: 6 18 2024 FINDINGS: See Impression. IMPRESSION: ET tube terminates 3  cm from the gabo    Electronically signed by Manasa Reina MD on 09-17-24 at 0141     The above radiology studies were ordered by me.  See ED course for independent interpretations.     MEDICATIONS GIVEN IN ER  Medications   norepinephrine (LEVOPHED) 8 mg in 250 mL NS infusion (premix) (0.3 mcg/kg/min × 68 kg Intravenous New Bag 9/17/24 0530)   vasopressin (PITRESSIN) 20 units in 100 mL NS infusion (0.03 Units/min Intravenous Restarted 9/17/24 0620)   propofol (DIPRIVAN) infusion 10 mg/mL 100 mL (has no administration in time range)   fentaNYL citrate (PF) (SUBLIMAZE) injection 100 mcg (has no administration in time range)   nitroglycerin (NITROSTAT) SL tablet 0.4 mg (has no administration in time range)   sennosides-docusate (PERICOLACE) 8.6-50 MG per tablet 2 tablet (2 tablets Oral Not Given 9/17/24 0330)     And   polyethylene glycol (MIRALAX) packet 17 g (has no administration in time range)     And   bisacodyl (DULCOLAX) EC tablet 5 mg (has no administration in time range)     And   bisacodyl (DULCOLAX) suppository 10 mg (has no administration in time range)   acetaminophen (TYLENOL) tablet 650 mg (has no administration in time range)     Or   acetaminophen (TYLENOL) suppository 650 mg (has no administration in time range)   chlorhexidine (PERIDEX) 0.12 % solution 15 mL (has no administration in time range)   pantoprazole (PROTONIX) injection 40 mg (has no administration in time range)   meropenem (MERREM) 1,000 mg in sodium chloride 0.9 % 100 mL MBP (has no administration in time range)   atorvastatin (LIPITOR) tablet 40 mg (has no administration in time range)   levothyroxine (SYNTHROID, LEVOTHROID) tablet 25 mcg (has no administration in time range)   ipratropium-albuterol (DUO-NEB) nebulizer solution 3 mL (3 mL Nebulization Given 9/17/24 0640)   guaifenesin (ROBITUSSIN) 100 MG/5ML liquid 200 mg (has no administration in time range)   Pharmacy to dose vancomycin (has no administration in time range)    sodium chloride 0.9 % infusion (125 mL/hr Intravenous New Bag 9/17/24 0634)   Vancomycin HCl 1,250 mg in sodium chloride 0.9 % 250 mL VTB (has no administration in time range)   etomidate (AMIDATE) injection 20.4 mg (20.4 mg Intravenous Given by Other 9/17/24 0030)   rocuronium (ZEMURON) injection 70 mg (70 mg Intravenous Given by Other 9/17/24 0030)   sepsis fluid LR bolus 2,040 mL (2,040 mL Intravenous New Bag 9/16/24 2310)   Meropenem 2,000 mg in sodium chloride 0.9 % 100 mL MBP (0 mg Intravenous Stopped 9/17/24 0025)   lactated ringers bolus 1,000 mL (0 mL Intravenous Stopped 9/17/24 0247)         ORDERS PLACED DURING THIS VISIT:  Orders Placed This Encounter   Procedures    Intubation    Respiratory Panel PCR w/COVID-19(SARS-CoV-2) [facility] In-House, NP Swab in UTM/VTM, 2 HR TAT - Swab, Nasopharynx    Blood Culture - Blood,    Blood Culture - Blood,    Urine Culture - Urine,    Respiratory Culture - Lavage, ET Suction    Legionella Antigen, Urine - Urine, Indwelling Urethral Catheter    S. Pneumo Ag Urine or CSF - Urine, Indwelling Urethral Catheter    CT Head Without Contrast    XR Chest 1 View    XR Chest 1 View    XR Chest 1 View    XR Abdomen KUB    Comprehensive Metabolic Panel    Lipase    Procalcitonin    High Sensitivity Troponin T    CBC Auto Differential    Blood Gas, Arterial -    Lactic Acid, Plasma    Urinalysis With Culture If Indicated - Urine, Catheter    Manual Differential    STAT Lactic Acid, Reflex    High Sensitivity Troponin T 2Hr    Blood Gas, Arterial -    Basic Metabolic Panel    CBC Auto Differential    Urinalysis, Microscopic Only - Urine, Clean Catch    Blood Gas, Arterial -    STAT Lactic Acid, Reflex    Blood Gas, Arterial -    NPO Diet NPO Type: Strict NPO    Straight Cath    Please replace Mariscal catheter and obtain urine specimen from newly placed catheter  Misc Nursing Order (Specify)    Target Arousal Level RASS -1 to -2    Vital Signs Every Hour and Per  Hospital Policy Based on Patient Condition    Telemetry - Place Orders & Notify Provider of Results When Patient Experiences Acute Chest Pain, Dysrhythmia or Respiratory Distress    Continuous Pulse Oximetry    Height & Weight    Daily Weights    Intake & Output    Saline Lock & Maintain IV Access    Place Sequential Compression Device    Maintain Sequential Compression Device    Strict Bed Rest    G-Tube to Gravity    Elevate HOB    Oral Care & Teeth Brushing - Intubated Patient    Subglottic Suctioning Must Be Done Every 6 Hours    RN May Place Order For ABG As Needed for Respiratory Distress    May give meds through g-tube and clamp x 30 minutes then place back to gravity drainage  Nursing Communication    Adjust tidal volume 550  Misc Nursing Order (Specify)    Code Status and Medical Interventions: CPR (Attempt to Resuscitate); Full Support    Pulmonology (on-call MD unless specified)    IP Palliative Care Nurse Consult    Inpatient Infectious Diseases Consult    Capnography (ETCO2) Monitoring    Ventilator - Vent Mode: AC/VC; Rate: Other; Rate: 22; FiO2: 50%; PEEP: 5; Tidal Volume: mL; TV: 500    POC Glucose Once    POC Glucose Finger Q6H    POC Glucose Once    ECG 12 Lead Altered Mental Status    ECG 12 Lead Altered Mental Status    Wound Ostomy Eval & Treat    Insert Peripheral IV    Inpatient Admission    Inpatient Admission    CBC & Differential    CBC & Differential         OUTPATIENT MEDICATION MANAGEMENT:  Current Facility-Administered Medications Ordered in Epic   Medication Dose Route Frequency Provider Last Rate Last Admin    acetaminophen (TYLENOL) tablet 650 mg  650 mg Oral Q4H PRN Aissatou Tesfaye APRN        Or    acetaminophen (TYLENOL) suppository 650 mg  650 mg Rectal Q4H PRN Aissatou Tesfaye APRN        atorvastatin (LIPITOR) tablet 40 mg  40 mg Per PEG Tube Daily Aissatou Tesfaye APRN        sennosides-docusate (PERICOLACE) 8.6-50 MG per tablet 2 tablet  2 tablet Oral BID Aissatou Tesfaye  NOHEMI MARS        And    polyethylene glycol (MIRALAX) packet 17 g  17 g Oral Daily PRN Aissatou Tesfaye APRN        And    bisacodyl (DULCOLAX) EC tablet 5 mg  5 mg Oral Daily PRN Aissatou Tesfaye APRN        And    bisacodyl (DULCOLAX) suppository 10 mg  10 mg Rectal Daily PRN Aissatou Tesfaye APRN        chlorhexidine (PERIDEX) 0.12 % solution 15 mL  15 mL Mouth/Throat Q12H Aissatou Tesfaye APRN        fentaNYL citrate (PF) (SUBLIMAZE) injection 100 mcg  100 mcg Intravenous Once Chris Crooks MD        guaifenesin (ROBITUSSIN) 100 MG/5ML liquid 200 mg  200 mg Per PEG Tube Q6H Aissatou Tesfaye APRN        ipratropium-albuterol (DUO-NEB) nebulizer solution 3 mL  3 mL Nebulization 4x Daily - RT Aissatou Tesfaye APRN   3 mL at 09/17/24 0640    levothyroxine (SYNTHROID, LEVOTHROID) tablet 25 mcg  25 mcg Per PEG Tube Q AM Aissatou Tesfaye APRN        meropenem (MERREM) 1,000 mg in sodium chloride 0.9 % 100 mL MBP  1,000 mg Intravenous Q8H Aissatou Tesfaye APRN        nitroglycerin (NITROSTAT) SL tablet 0.4 mg  0.4 mg Sublingual Q5 Min PRN Aissatou Tesfaye APRN        norepinephrine (LEVOPHED) 8 mg in 250 mL NS infusion (premix)  0.02-0.3 mcg/kg/min Intravenous Titrated Hang Nevarez MD 38.25 mL/hr at 09/17/24 0530 0.3 mcg/kg/min at 09/17/24 0530    pantoprazole (PROTONIX) injection 40 mg  40 mg Intravenous Q24H Aissatou Tesfaye APRN        Pharmacy to dose vancomycin   Does not apply Continuous PRN Livier Vegas MD        propofol (DIPRIVAN) infusion 10 mg/mL 100 mL  5-50 mcg/kg/min Intravenous Titrated Chris Crooks MD        sodium chloride 0.9 % infusion  125 mL/hr Intravenous Continuous Livier Vegas  mL/hr at 09/17/24 0634 125 mL/hr at 09/17/24 0634    Vancomycin HCl 1,250 mg in sodium chloride 0.9 % 250 mL VTB  20 mg/kg Intravenous Once Livier Vegas MD        vasopressin (PITRESSIN) 20 units in 100 mL NS infusion  0.03 Units/min Intravenous Continuous Chris Crooks  MD Luis A 9 mL/hr at 09/17/24 0620 0.03 Units/min at 09/17/24 0620     Current Outpatient Medications Ordered in Epic   Medication Sig Dispense Refill    acetaminophen (TYLENOL) 325 MG tablet Take 2 tablets by mouth Every 4 (Four) Hours As Needed for Mild Pain.      Ascorbic Acid (VITAMIN C PO) Daily.      atorvastatin (LIPITOR) 40 MG tablet       bisacodyl (DULCOLAX) 10 MG suppository Insert 1 suppository into the rectum Daily As Needed for Constipation (Use if bisacodyl oral is ineffective).      bisacodyl (DULCOLAX) 5 MG EC tablet Take 1 tablet by mouth Daily As Needed for Constipation (Use if polyethylene glycol is ineffective).      calcium carbonate (TUMS) 500 MG chewable tablet Chew 2 tablets 2 (Two) Times a Day As Needed for Heartburn.      cyclobenzaprine (FLEXERIL) 10 MG tablet TAKE 0.5   1 TABLET BY ORAL ROUTE AT BEDTIME AS NEEDED      Effer-K 20 MEQ effervescent tablet       folic acid (FOLVITE) 1 MG tablet Take 1 tablet by mouth Daily.      lansoprazole (PREVACID SOLUTAB) 30 MG Tablet Delayed Release Dispersible disintegrating tablet Administer 1 tablet per G tube Every Morning.      levothyroxine (SYNTHROID, LEVOTHROID) 25 MCG tablet       melatonin 3 MG tablet Take 1 tablet by mouth At Night As Needed for Sleep.      methotrexate 2.5 MG tablet Take 1 tablet by mouth 1 (One) Time Per Week. Take 2.5 mg on Wednesday and 2.5 mg on Thursday.  Do not take any medication on Friday through Tuesday.      metoprolol succinate XL (TOPROL-XL) 25 MG 24 hr tablet Take 1 tablet by mouth Daily.      polyethylene glycol (MIRALAX) 17 g packet Take 17 g by mouth Daily As Needed (Use if senna-docusate is ineffective).      polyethylene glycol (MIRALAX) 17 g packet Take 17 g by mouth 2 (Two) Times a Day.      potassium chloride (KAYCIEL) 20 mEq/15 mL solution Take 15 mL by mouth Daily.      sennosides-docusate (PERICOLACE) 8.6-50 MG per tablet Take 2 tablets by mouth 2 (Two) Times a Day.      terazosin (HYTRIN) 1 MG  capsule Administer 1 capsule per G tube Every Night.           PROCEDURES  Intubation    Date/Time: 9/17/2024 12:37 AM    Performed by: Chris Crooks MD  Authorized by: Chris Crooks MD    Consent:     Consent obtained:  Verbal (with patient, daughter)    Consent given by:  Patient  Pre-procedure details:     Indications: airway protection and respiratory failure      Patient status:  Awake    Neck mobility: reduced (ankylosing spondylitis)      Pharmacologic strategy: RSI      Induction agents:  Etomidate    Paralytics:  Rocuronium  Procedure details:     Preoxygenation:  Nonrebreather mask    CPR in progress: no      Number of attempts:  1  Successful intubation attempt details:     Intubation method:  Oral    Intubation technique: video assisted      Laryngoscope blade:  Hypercurved    Grade view: I      Tube size (mm):  8.0    Tube type:  Cuffed    Tube visualized through cords: yes    Placement assessment:     ETT at teeth/gumline (cm):  22    Tube secured with:  ETT haddad    Breath sounds:  Equal    Placement verification: chest rise, colorimetric ETCO2, direct visualization and equal breath sounds    Post-procedure details:     Procedure completion:  Tolerated well, no immediate complications    Complications: hypoxia    Comments:      Brief hypoxia to 70%, resolved within 30 seconds of intubation        Critical care provider statement:    Critical care time (minutes): 60.   Critical care time was exclusive of:  Separately billable procedures and treating other patients   Critical care was necessary to treat or prevent imminent or life-threatening deterioration of the following conditions:  Sepsis   Critical care was time spent personally by me on the following activities:  Development of treatment plan with patient or surrogate, discussions with consultants, evaluation of patient's response to treatment, examination of patient, obtaining history from patient or surrogate, ordering and  performing treatments and interventions, ordering and review of laboratory studies, ordering and review of radiographic studies, pulse oximetry, re-evaluation of patient's condition and review of old charts. Critical Care indicators: Sepsis / septicemia Pressors: dobutamine, dopamine, epinephrine, levophed, lopressor, et al.      PROGRESS, DATA ANALYSIS, CONSULTS, AND MEDICAL DECISION MAKING  All labs have been independently interpreted by me.  All radiology studies have been reviewed by me. All EKG's have been independently viewed and interpreted by me.  Discussion below represents my analysis of pertinent findings related to patient's condition, differential diagnosis, treatment plan and final disposition.    Differential diagnosis includes but is not limited to:  Pneumonia  UTI  Sepsis  Septic shock  Osteomyelitis      Clinical Scores:                  ED Course as of 09/17/24 0722   Mon Sep 16, 2024   2311 EKG          EKG time: 2300  Rhythm/Rate: Sinus tach, 117  P waves and [place]: Normal  QRS, axis: Normal axis  ST and T waves: Borderline ST elevation laterally appears to be due to wandering baseline    Interpreted Contemporaneously by me, independently viewed  Similar compared to prior 8/22/2024       [JR]   2314 I reviewed recent bone culture from 8/20/2024 that grew E. coli, Proteus, Pseudomonas.  I also reviewed previous urine culture from 8/17/2024 that grew ESBL E. coli that was sensitive to ertapenem, meropenem, Zosyn, resistant to Levaquin. [JR]   2317 I had initially ordered ceftriaxone for empiric coverage however upon recent culture review it appears that meropenem would be more appropriate given his recent ESBL E. coli in the urine and also recent history of Pseudomonas in his left ankle. [JR]   2319 I reviewed discharge summary from 8/28/2024.  Patient was admitted for abnormal lab and urinary tract infection.  He has a history of ankylosing spondylitis, BPH, chronic in [place] Mariscal catheter,  hypertension, dysphagia with PEG tube for tube feedings, and worsening sacral and ischial pressure ulcers.  He underwent debridement of his wounds by general surgery and podiatry.  They recommended palliative care.  He will go to facility with hospice and do normal saline or Dakin's wet-to-dry dressing changing it twice daily and should wear protective boots for heel protection. [JR]   2350 Discussed with patient's daughter/medical POA by phone.  She states that he has been getting palliative care but is not enrolled in hospice.  She believes that he would want to be a full code. [JR]   Tue Sep 17, 2024   0010 Chest x-ray dependently interpreted PACS there is extensive infiltrate left lung field. [JR]   0013 I explained to the patient who is now more awake and alert that he has pneumonia and I am concerned that due to his hypoxic respiratory failure he does need intubation and needs to be placed on a ventilator.  He stated to do what ever needed to be done.  I also spoke with his daughter by phone and she is agreeable with treatment plan. [JR]   0035 Patient was intubated without difficulty.  He had brief hypoxia prior to intubation. [JR]   0121 Discussed with NOHEMI Reyez for pulmonology, who agrees to admit to the ICU on behalf of Dr. Vegas. [JR]      ED Course User Index  [JR] Chris Crooks MD             AS OF 07:22 EDT VITALS:    BP - 101/67  HR - 86  TEMP - 97.8 °F (36.6 °C)  O2 SATS - 96%    COMPLEXITY OF CARE  The patient requires admission.      Chronic or social conditions impacting patient care (Social Determinants of Health):     DIAGNOSIS  Final diagnoses:   Acute respiratory failure with hypoxia   Septic shock   Pneumonia due to infectious organism, unspecified laterality, unspecified part of lung   Pressure injury of skin of sacral region, unspecified injury stage   Chronic heel ulcer, left, with unspecified severity   Ankylosing spondylitis, unspecified site of spine   Immobility    Indwelling Mariscal catheter present   Malnutrition, unspecified type   Dysphagia, unspecified type   Feeding by G-tube   Acute encephalopathy           DISPOSITION  ADMIT to ICU      Prescription drug monitoring program review:           Please note that portions of this document were completed with a voice recognition program.    Note Disclaimer: At [facility], we believe that sharing information builds trust and better relationships. You are receiving this note because you recently visited [facility]. It is possible you will see health information before a provider has talked with you about it. This kind of information can be easy to misunderstand. To help you fully understand what it means for your health, we urge you to discuss this note with your provider.         Chris Crooks MD  09/17/24 0722

## 2024-09-17 NOTE — NURSING NOTE
CWCN: We see patient per request of the floor nurse regarding skin issue at Coccyx, left gluteal area and left Heel. Patient known to our department from last admission, with hx of multiple co-morbidities, required intubation in ER. He is currently somewhat unstable according to the floor nurse, we only checked the left heel, on which we could see soft yellow tissue appearance, adhered to the wound bed, red audra-wound, and serosanguineous drainage from scant amount was seen, following photos downloaded into Epic, Dakin's 1/4 solution moist gauze is ordered to Coccyx, left gluteal and left hell open wound.  Wound care order and pressure ulcer preventive measures have been implemented into Epic.  He is on specialty mattress for adequate pressure redistribution and pressure relief.  WCN will follow him in two days.  Please re-consult for any additional needs.

## 2024-09-18 NOTE — PROGRESS NOTES
LOS: 1 day   Patient Care Team:  Keshav Eason MD as PCP - General (Internal Medicine)  Placido Shirley APRN as Referring Physician (Gastroenterology)  Rick Quarles MD as Consulting Physician (Hematology and Oncology)    Chief Complaint:  F/up respiratory failure, mechanical ventilation, critical care management, sepsis    Subjective   Interval History    On the ventilator with AC PC 22/15.  Following commands when sedation is lowered.  I saw him when he was on propofol 10 mics/KG/minute and he was somnolent but was able to arouse to stimulus.  No fever.  No significant secretions from the ET tube.  Remains on Levophed.    REVIEW OF SYSTEMS:   Cannot obtain.  Patient is on the ventilator    Ventilator/Non-Invasive Ventilation Settings (From admission, onward)       Start     Ordered    09/17/24 0356  Ventilator - Vent Mode: AC/VC; Rate: Other; Rate: 22; FiO2: Titrate Per SpO2; Titrate Oxygen for SpO2: 90 - 95%; PEEP: 5; Tidal Volume: mL; TV: 500  Continuous,   Status:  Canceled        Question Answer Comment   Vent Mode AC/VC    Rate Other    Rate 22    FiO2 Titrate Per SpO2    Titrate Oxygen for SpO2 90 - 95%    PEEP 5    Tidal Volume mL            09/17/24 0355    09/17/24 0356  Ventilator - Vent Mode: AC/VC; Rate: Other; Rate: 22; FiO2: 50%; PEEP: 5; Tidal Volume: mL; TV: 500  Continuous        Question Answer Comment   Vent Mode AC/VC    Rate Other    Rate 22    FiO2 50%    PEEP 5    Tidal Volume mL            09/17/24 0355    09/17/24 0355  Ventilator - Vent Mode: AC/VC; Rate: Other; Rate: 24; FiO2: Titrate Per SpO2; Titrate Oxygen for SpO2: 90 - 95%; PEEP: 5; Tidal Volume: mL; TV: 500  Continuous,   Status:  Canceled        Question Answer Comment   Vent Mode AC/VC    Rate Other    Rate 24    FiO2 Titrate Per SpO2    Titrate Oxygen for SpO2 90 - 95%    PEEP 5    Tidal Volume mL            09/17/24 0354    09/17/24 0225  Ventilator -  Vent Mode: AC/VC; Rate: 20; FiO2: Titrate Per SpO2; Titrate Oxygen for SpO2: 90 - 95%; PEEP: 5; Tidal Volume: mL; TV: 500  Continuous,   Status:  Canceled        Question Answer Comment   Vent Mode AC/VC    Rate 20    FiO2 Titrate Per SpO2    Titrate Oxygen for SpO2 90 - 95%    PEEP 5    Tidal Volume mL            09/17/24 0224                          Physical Exam:     Vital Signs  Temp:  [98 °F (36.7 °C)-98.1 °F (36.7 °C)] 98.1 °F (36.7 °C)  Heart Rate:  [] 109  Resp:  [22-24] 22  BP: ()/(44-97) 134/66  FiO2 (%):  [31 %-41 %] 31 %    Intake/Output Summary (Last 24 hours) at 9/18/2024 1404  Last data filed at 9/18/2024 0623  Gross per 24 hour   Intake 6416.7 ml   Output 825 ml   Net 5591.7 ml     Flowsheet Rows      Flowsheet Row First Filed Value   Admission Height --   Admission Weight 68 kg (149 lb 14.6 oz) Documented at 09/16/2024 2100            PPE used per hospital policy    General Appearance:  On the ventilator.  Alert.  Cachectic.   ENMT: ET tube noted.  Extremities normal.   Eyes:  Pupils equal and reactive to light. EOMI   Neck:   Trachea midline. No thyromegaly.   Lungs:   Coarse anteriorly.  No wheezing.  Mild crackles    Heart:   Regular rhythm and normal rate, normal S1 and S2, no         murmur   Skin:   No rash or ecchymosis   Abdomen:   PEG tube noted.  Soft. No tenderness. No HSM.   Neuro/psych: Somnolent.  Arouses to stimulus.  Moves extremities.   Extremities:  No cyanosis, clubbing or edema.  Warm extremities and well-perfused.  Loss of muscle mass in arms and legs.          Results Review:        Results from last 7 days   Lab Units 09/18/24  0708 09/17/24  0516 09/16/24  2357   SODIUM mmol/L 140 132* 137   POTASSIUM mmol/L 3.1* 5.3* 4.8   CHLORIDE mmol/L 109* 104 101   CO2 mmol/L 21.9* 17.0* 23.0   BUN mg/dL 17 23 24*   CREATININE mg/dL 0.51* 0.84 1.06   GLUCOSE mg/dL 63* 84 87   CALCIUM mg/dL 7.7* 8.3* 9.0     Results from last 7 days   Lab Units 09/17/24  0544  09/16/24  2357   HSTROP T ng/L 50* 85*     Results from last 7 days   Lab Units 09/18/24  0709 09/17/24  1127 09/16/24  2357   WBC 10*3/mm3 13.50* 14.72* 6.51   HEMOGLOBIN g/dL 8.3* 9.3* 9.7*   HEMATOCRIT % 26.9* 29.3* 30.4*   PLATELETS 10*3/mm3 248 267 345                           Results from last 7 days   Lab Units 09/18/24  0357 09/17/24  0641 09/17/24  0339 09/17/24  0009   PH, ARTERIAL pH units 7.420 7.428 7.243* 7.295*   PCO2, ARTERIAL mm Hg 36.0 37.8 64.2* 45.1*   PO2 ART mm Hg 109.1* 85.5 228.6* 68.0*   O2 SATURATION ART % 98.4 96.8 99.7* 91.0*   FLOW RATE lpm  --   --   --  15.0000   MODALITY  Adult Vent Adult Vent Adult Vent NRB         I reviewed the patient's new clinical results.        Medication Review:   atorvastatin, 40 mg, Per PEG Tube, Daily  chlorhexidine, 15 mL, Mouth/Throat, Q12H  enoxaparin, 40 mg, Subcutaneous, Q24H  fentaNYL citrate (PF), 100 mcg, Intravenous, Once  guaifenesin, 200 mg, Per PEG Tube, Q6H  ipratropium-albuterol, 3 mL, Nebulization, 4x Daily - RT  levothyroxine, 25 mcg, Per PEG Tube, Q AM  meropenem, 1,000 mg, Intravenous, Q8H  pantoprazole, 40 mg, Intravenous, Q24H  senna-docusate sodium, 2 tablet, Per PEG Tube, BID  sodium hypochlorite, , Topical, Q12H  vancomycin, 1,000 mg, Intravenous, Q12H        norepinephrine, 0.02-0.3 mcg/kg/min, Last Rate: 0.18 mcg/kg/min (09/18/24 1246)  Pharmacy to dose vancomycin,   propofol, 5-50 mcg/kg/min, Last Rate: Stopped (09/18/24 1247)  sodium chloride, 125 mL/hr, Last Rate: 125 mL/hr (09/18/24 0834)  vasopressin, 0.03 Units/min, Last Rate: Stopped (09/18/24 1200)        Diagnostic imaging:  I personally and independently reviewed the following images:  CXR 9/17/2024: Consolidation in the left lung.  Patchy infiltrates on the right.    CXR 9/18/2024: Bilateral pulmonary infiltrates/pneumonia mostly on the left.    Assessment     Septic shock, POA  Bilateral pneumonia, MRSA  Acute hypoxemic and hypercapnic respiratory failure, requiring  mechanical ventilation 9/16  Metabolic encephalopathy  Lactic acidosis, resolved  Elevated troponin  Severe protein calorie malnutrition/hypoalbuminemia (albumin 2)  Slightly elevated troponin, probably non-STEMI type II  Positive MRSA screen  Mild hyperkalemia    Chronic anemia  Ankylosing spondylitis of cervical spine, on methotrexate  BPH  Chronic indwelling Mariscal catheter  HTN  Dysphagia with PEG tube  Failure to thrive  Hx osteomyelitis left calcaneus (culture 8/20 positive for Proteus mirabilis, Pseudomonas and E. Coli)  Urine culture positive for ESBL 8/17/2024  Pressure ulcers: Left calcaneal, sacrum, ischial.  POA.  Debrided 8/20/2024  History of ESBL bacteremia 6/18/2024  Hypothyroidism      Plan     Mechanical ventilation management: Settings reviewed and adjusted.  Placed on pressure support 7.  He maintained an MV around 11 and had good RR and TV around 450.  Will conduct a weaning trial for 1 hour and obtain parameters and determine whether he could be extubated.  I did attempt to do NIF shortly after holding propofol but he was not able to cooperate.  Continue vancomycin for pneumonia.  Urine culture negative.  Will discontinue meropenem if no further bacteria grew on sputum culture  Ventilator bundle.  ICU core measures: Initiate PPI prophylaxis.  DVT prophylaxis with Lovenox.  Titrate to wean off propofol for weaning trial.  fentanyl 50 mcg every hour as needed.  Initiate tube feeding.  Bowel regimen as needed.  Levophed IV drip.  Titrate to keep MAP >65        Margaret Carrizales MD  09/18/24  14:04 EDT        Time: Critical care 35 min      This note was dictated utilizing Youtopia[employer] dictation

## 2024-09-18 NOTE — SIGNIFICANT NOTE
09/18/24 0737   B = Both Spontaneous Awakening and Breathing Trials   Was patient receiving mechanical ventilation? Yes   Safety Screen Spontaneous Breathing Trial (SBT)    (Sedation)     PT receiving sedation

## 2024-09-18 NOTE — DISCHARGE PLACEMENT REQUEST
"Anthony Maravilla (79 y.o. Male)       Date of Birth   1945    Social Security Number       Address   [address] [id number]    Home Phone   [phone]    MRN   [id number]       [facility]    Marital Status                               Admission Date   9/16/24    Admission Type   Emergency    Admitting Provider   Livier Vegas MD    Attending Provider   Livier Vegas MD    Department, Room/Bed   [facility], [facility]/1       Discharge Date       Discharge Disposition       Discharge Destination                                 Attending Provider: Livier Vegas MD    Allergies: No Known Allergies    Isolation: Contact   Infection: MRSA (09/17/24), Candida Auris (rule out) (09/18/24)   Code Status: CPR    Ht: 190.5 cm (75\")   Wt: 73.8 kg (162 lb 11.2 oz)    Admission Cmt: None   Principal Problem: Sepsis [A41.9]                   Active Insurance as of 9/16/2024       Primary Coverage       Payor Plan Insurance Group Employer/Plan Group    [employer]NA MEDICARE REPLACEMENT AETNA MED ADV POS [id number]       Payor Plan Address Payor Plan Phone Number Payor Plan Fax Number Effective Dates    [address] [phone]  12/1/2023 - None Entered    [facility] [id number]         Subscriber Name Subscriber Birth Date Member ID       ANTHONY MARAVILLA 1945 [id number]                     Emergency Contacts        (Rel.) Home Phone Work Phone Mobile Phone    NATHALIE DON (Daughter) [phone] -- [phone]    MAGY MARAVILLA (Son) [phone] -- [phone]                "

## 2024-09-18 NOTE — PAYOR COMM NOTE
"Anthony Gallegos (79 y.o. Male)                         ATTENTION INITIAL CLINICALS AUTH PENDING [id number]                     REPLY TO [facility] DEPT  [phone] OR CALL   SHAYE SALAZAR LPANA           Date of Birth   1945    Social Security Number       Address   [address] [id number]    Home Phone   [phone]    MRN   [id number]       Restorationist   Catholic    Marital Status                               Admission Date   9/16/24    Admission Type   Emergency    Admitting Provider   Livier Vegas MD    Attending Provider   Livier Vegas MD    Department, Room/Bed   [facility], N331/1       Discharge Date       Discharge Disposition       Discharge Destination                                 Attending Provider: Livier Vegas MD    Allergies: No Known Allergies    Isolation: Contact   Infection: MRSA (09/17/24)   Code Status: CPR    Ht: 190.5 cm (75\")   Wt: 73.8 kg (162 lb 11.2 oz)    Admission Cmt: None   Principal Problem: Sepsis [A41.9]                   Active Insurance as of 9/16/2024       Primary Coverage       Payor Plan Insurance Group Employer/Plan Group    AETNA MEDICARE REPLACEMENT AETNA MED ADV POS [id number]       Payor Plan Address Payor Plan Phone Number Payor Plan Fax Number Effective Dates    [address] [phone]  12/1/2023 - None Entered    [facility] [id number]         Subscriber Name Subscriber Birth Date Member ID       ANTHONY GALLEGOS 1945 [id number]                     Emergency Contacts        (Rel.) Home Phone Work Phone Mobile Phone    NATHALIE DON (Daughter) [phone] -- [phone]    MAGY GALLEGOS (Son) [phone] -- [phone]                 History & Physical        Bills, NOHEMI Jimenez at 09/17/24 0101       Attestation signed by Livier Vegas MD at 09/17/24 0605    I have reviewed this documentation and agree.  The SUKHWINDER and I have discussed this patient's " history, physical exam, and treatment plan.  I have reviewed the documentation and personally had a face to face interaction with the patient. I affirm the documentation and agree with the treatment and plan I personally performed >50% of the management in this split shared patient visit.  The attached note describes my personal findings and adjustments to plan as outlined by SUKHWINDER.  79-year-old gentleman with multiple medical issues including ankylosing spondylitis decubitus wounds chronic dysphagia with PEG tube placement presented to the emergency room from nursing facility with altered mental status.  Patient noted to be hypoxic and intubated in the emergency room.  Currently on the vent 50% FiO2 with PEEP of 5.  Patient sedated intubated unable to get any meaningful history.  Apparently palliative care has been following patient at the nursing facility but daughter requested full measures.  Patient noted to be hypotensive currently on some Levophed.  Severe sepsis with aspiration pneumonia and multiple wound in the coccyx left gluteal left heel and sacrum.  Broad-spectrum antibiotic Merrem and vancomycin pharmacy to dose and consult infectious diseases.  Continue current vent support and adjust vent based on ABG  IV fluids and watch urine output closely  Resume tube feeds in the morning once clinically more stable  ICU core measures    Critical care time 35 minutes                  Group: [facility]         History and Physical    Patient Identification:  Anthony Gallegos  79 y.o.  male  1945  [id number]            Requesting physician: Dr Crooks    Reason for Consultation: ICU admission    CC: Sepsis, respiratory failure, hypotension    History of Present Illness:  Anthony Gallegos is an 79 year old male with past medical history ankylosing spondylitis, failure to thrive, chronic decubitus wounds, chronic dysphagia with PEG tube, chronic anemia, HTN wo presented to the ED from nursing facility  via EMS.  Per nursing staff reports patient is normally alert and oriented x 4, tonight patient was found to be with altered mental status only responding to pain with unknown last known normal.  When EMS arrived patient noted to be hypoxic with SpO2 70% on room air.  He was placed on nonrebreather with some improvement in SpO2 but upon arrival to ED was noted to be hypotensive with BP 59/36, heart rate 126, tachypneic.  Patient required intubation in the ED, upon intubation per ED MD reported tube feed like material that was suctioned from airway.  Chest x-ray revealed extensive pulmonary infiltrate.  PEG tube was placed to low wall suction.  Labs revealed lactate 6.3, procalcitonin 68.40, hemoglobin 9.7, albumin 2.3, troponin 85.  Patient was given sepsis fluid bolus with 3L IVF, patient still hypotensive so was started on norepinephrine and vasopressin.  We have been asked to admit patient to critical care for further management.    Recently inpatient at [facility] from 8/17/2024-8/28/2024, was admitted and treated for multiple decubitus wounds requiring debridement of sacral and left foot wounds, osteomyelitis, UTI, failure to thrive, acute on chronic anemia.  Podiatry/ID/General surgery felt his chronic wounds/osteomyelitis are not curable and palliative wound care was recommended. [facility] was ultimately consulted and he was discharged back to nursing facility with [facility] services to follow.    Patient's daughter (medical POA) was contacted per ED MD who stated palliative care had been following at nursing facility since discharge home but she requested full measures at this time.    Review of Systems  Unable r/t intubated; sedated    Past Medical History:  Past Medical History:   Diagnosis Date    Abscess of scrotum     MARTITA (acute kidney injury)     Altered mental state     Arthritis     AS (ankylosing spondylitis)     History of MRSA infection     Hypertension     Leukocytosis     Tetrahydrocannabinol (THC) use  disorder, mild, abuse 12/20/2023    Uveitis        Past Surgical History:  Past Surgical History:   Procedure Laterality Date    COLONOSCOPY      ENDOSCOPY W/ PEG TUBE PLACEMENT N/A 03/29/2024    Procedure: ESOPHAGOGASTRODUODENOSCOPY WITH PERCUTANEOUS ENDOSCOPIC GASTROSTOMY TUBE INSERTION;  Surgeon: Khadar Broderick MD;  Location: [facility] ENDOSCOPY;  Service: General;  Laterality: N/A;  PRE/POST - DYSPHAGIA    ROTATOR CUFF REPAIR Right     TOTAL HIP ARTHROPLASTY Left 2014    UPPER GASTROINTESTINAL ENDOSCOPY      WOUND DEBRIDEMENT N/A 08/20/2024    Procedure: DEBRIDEMENT SACRAL ULCER/WOUND;  Surgeon: Justine Roqeu MD;  Location: [facility] MAIN OR;  Service: General;  Laterality: N/A;    WOUND DEBRIDEMENT Left 08/20/2024    Procedure: LEFT DEBRIDEMENT FOOT;  Surgeon: Renny Duval DPM;  Location: [facility] MAIN OR;  Service: Podiatry;  Laterality: Left;        Home Meds:  (Not in a hospital admission)      Allergies:  No Known Allergies    Social History:   Social History     Socioeconomic History    Marital status:     Number of children: 2   Tobacco Use    Smoking status: Never    Smokeless tobacco: Never   Vaping Use    Vaping status: Never Used   Substance and Sexual Activity    Alcohol use: No    Drug use: No    Sexual activity: Defer       Family History:  Family History   Problem Relation Age of Onset    Alzheimer's disease Mother     Colon cancer Neg Hx     Colon polyps Neg Hx     Crohn's disease Neg Hx     Irritable bowel syndrome Neg Hx     Ulcerative colitis Neg Hx     Malig Hyperthermia Neg Hx        Physical Exam:  /69 (BP Location: Right arm, Patient Position: Lying)   Pulse 92   Temp 97.8 °F (36.6 °C)   Resp 24   Wt 68 kg (149 lb 14.6 oz)   SpO2 (!) 89%   BMI 18.74 kg/m²  Body mass index is 18.74 kg/m². (!) 89% 68 kg (149 lb 14.6 oz)    Physical Exam  Constitutional: Elderly male lying in bed, intubated, synchronous with ventilator, not on any sedation currently upon  exam  Head: NCAT  Eyes: No pallor, anicteric conjunctiva, pupils 3 equal round brisk bilaterally  ENMT:  ETT to vent.  No oral thrush. Dry MM.   NECK: Trachea midline, no thyromegaly  Heart: RRR, no pedal edema  Lungs: GINO +, clear to diminished bilaterally, no wheezing or crackles   Abdomen: Soft, nondistended.  No tenderness, guarding or rigidity. No palpable masses.  Gtube site c/d; gtube connected to LWS.  Mariscal catheter to bsdg clear yellow urine in bag.  Sacral, gluteal pressure wounds  Extremities: Extremities warm and well perfused. No cyanosis/ clubbing.  All pulses palpable.  Skin warm and dry.  Left heel wound wrapped with kerlix  Neuro: Unresponsive s/p etomidate, fentanyl, rocuronium.  Synchrynous with vent.  No extremity movement spontaneously or to pain  Psych: Unable to assess    PPE recommended per [facility] infectious disease Isolation protocol for the current clinical scenario(as mentioned below) was followed.    LABS:  COVID19   Date Value Ref Range Status   03/25/2024 Not Detected Not Detected - Ref. Range Final       Lab Results   Component Value Date    CALCIUM 9.0 09/16/2024    PHOS 2.9 08/27/2024     Results from last 7 days   Lab Units 09/16/24 2357 09/11/24  0921   SODIUM mmol/L 137  --    POTASSIUM mmol/L 4.8  --    CHLORIDE mmol/L 101  --    CO2 mmol/L 23.0  --    BUN mg/dL 24*  --    CREATININE mg/dL 1.06  --    GLUCOSE mg/dL 87  --    CALCIUM mg/dL 9.0  --    WBC 10*3/mm3 6.51 6.59   HEMOGLOBIN g/dL 9.7* 11.2*   PLATELETS 10*3/mm3 345 369   ALT (SGPT) U/L 24  --    AST (SGOT) U/L 27  --      Lab Results   Component Value Date    CKTOTAL 29 03/25/2024    TROPONINT 85 (C) 09/16/2024     Results from last 7 days   Lab Units 09/16/24 2357   HSTROP T ng/L 85*         Results from last 7 days   Lab Units 09/16/24 2357   LACTATE mmol/L 6.3*     Results from last 7 days   Lab Units 09/17/24  0009   PH, ARTERIAL pH units 7.295*   PCO2, ARTERIAL mm Hg 45.1*   PO2 ART mm Hg 68.0*   O2  SATURATION ART % 91.0*   FLOW RATE lpm 15.0000   MODALITY  NRB                 Lab Results   Component Value Date    TSH 6.800 (H) 08/17/2024     Estimated Creatinine Clearance: 53.5 mL/min (by C-G formula based on SCr of 1.06 mg/dL).         Imaging: I personally visualized the images of scans/x-rays performed within last 3 days.      Assessment:  Severe sepsis  Acute hypoxemic respiratory failure  AMS  Hypotension  Elevated troponin  Hypoalbuminemia  Chronic anemia  Ankylosing spondylitis of cervical spine, on methotrexate  BPH  Chronic indwelling Mariscal catheter  HTN  Dysphagia with PEG tube  Failure to thrive  Hx osteomyelitis left calcaneus  Hypothyroidism    Recommendations:   Severe sepsis/AMS  -Initial lactate 6.3; given sepsis IVF bolus.  Start continuous IVF, trend lactate  -Blood cultures pending, get sputum culture, s pneumo/legionella pcr's  -CXR reviewed, appears with multifocal infiltrate likely aspiration pneumonia; consolidation left lung field, reports of tube feed suctioned from airways during intubation.  He is afebrile  -Pt with history of multidrug resistant organisms, last admission wound cultures from foot and sacrum with E. Coli, proteus, pseudomonas.  Coccyx, left gluteal, left heel wounds appear with mostly granulating tissue; no overt infected exudate upon my exam.  UA negative for bacteria, nitrite neg, UA culture pending  -Continue Meropenem, will consult ID for further recommendations as they followed patient during last admission.  -CT head without contrast pending to r/o other causes of AMS    2.  Acute hypoxemic respiratory failure  -Post intubation CXR reviewed; ETT good position  -No post intubation ABG has been obtained yet, stat ABG now and adjust vent as needed  -Repeat ABG, CXR AM  -RVP negative  -Scheduled bronchodilator, mucinex    3.  Hypotension  -Hold home BP medications  -Receiving last of IVF bolus upon my exam, starting continuous IVF.  Currently requiring Levo and  Vaso.  Has good peripheral access 2 18ga's bilaterally, if unable to titrate vaso off soon will need central line placement    4.  Elevated troponin  -Initial trop 85, EKG with borderline ST elevation possibly due to wandering baseline  -Trend troponin, repeat EKG AM  -Consider cardiology consult    5.  Chronic dysphagia with PEG tube/hypoalbuminemia/failure to thrive  -PEG tube to gravity for now, may give meds only and clamp x 30 minutes  -Was seen by speech therapy last admission who recommended pureed, honey thickened liquids and was receiving nocturnal tube feeding  -Once extubated will need to be re-evaluated by speech therapy  -Abd seems to be somewhat firm to palpation on exam, will get KUB he does have history of constipation (bowel reg has been ordered)  -Failure to thrive; has lost 35# in the last 4 months    6.  Chronic pressure wounds/Hx left calcaneal osteomyelitis  -Pt with sacral, ischial, left calcaneal pressure wounds.  Consult wound care to see  -Sacral/ischial/heel wounds debrided by gen surgery and podiatry 8/20/2024. Had wound vac to heel and sacrum during last admission but this was discontinued as pt was discharged with [facility]    7.  Chronic indwelling forde catheter/BPH  -History of urine outflow obstruction requiring chronic forde; new forde catheter placed in ED, urine clear yellow  -Hx ESBL Ecoli UTI    8.  Anemia  -Appears chronic, follows with Hematology, was receiving procrit for anemia of chronic disease  -Hgb stable, monitor, no s/s bleeding    9.  Ankylosing spondylitis  -Hold methotrexate for now    10.  Hypothyroidism  -Continue levothyroxine, just started levothyroxine during last admission, f/u TSH as outpatient    VTE prophy: SCDs  GI prophy: PPI  Accucheck q 6  Full Code  Palliative care consult    Aissatou Tesfaye, APRN  9/17/2024  01:06 EDT      Much of this encounter note is an electronic transcription/translation of spoken language to printed text using Dragon Software.      Electronically signed by Livier Vegas MD at 09/17/24 0605          Emergency Department Notes        Chris Crooks MD at 09/16/24 2319        Procedure Orders    1. Intubation [[id number]] ordered by Chris Crooks MD                  EMERGENCY DEPARTMENT ENCOUNTER  Room Number:  16/16  PCP: Keshav Eason MD  Independent Historians: EMS      HPI:  Chief Complaint: had concerns including Shortness of Breath.     A complete HPI/ROS/PMH/PSH/SH/FH are unobtainable due to: Altered mental status      Context: Anthony Gallegos is a 79 y.o. male with a medical history of ankylosing spondylitis, chronic anemia, sacral and heel wounds with osteomyelitis, dysphagia with PEG tube dependency, BPH with chronic indwelling Mariscal catheter, who presents to the ED c/o acute altered mental status, shortness of breath, hypotension.  He presents from nursing facility.  According to EMS patient is a full code.  He was placed on nonrebreather prior to arrival.  Blood glucose 98 on arrival, blood pressure 59/36.  Patient is unable to provide me any history.  No reported fever per EMS.  Last known normal is unclear.      Review of prior external notes (non-ED) -and- Review of prior external test results outside of this encounter: See ED course below for summary of recent discharge summary and also recent culture information.        PAST MEDICAL HISTORY  Active Ambulatory Problems     Diagnosis Date Noted    Ankylosing spondylitis 06/29/2017    Recent unexplained weight loss 07/14/2017    Abnormal serum lipase level 08/10/2017    Abnormal serum level of amylase 08/10/2017    Essential hypertension 10/19/2017    Boil 03/22/2018    Immobility 12/20/2023    Fall from bed 12/20/2023    Tetrahydrocannabinol (THC) use disorder, mild, abuse 12/20/2023    Generalized pain 12/20/2023     12/20/2023    Grief 12/20/2023    DISH (disseminated idiopathic skeletal hyperostosis) 12/20/2023    Generalized weakness 12/21/2023     Severe malnutrition 12/24/2023    Altered mental status 01/21/2024    Transient alteration of awareness 01/22/2024    GERD without esophagitis 01/22/2024    Enlarged prostate 01/22/2024    UTI (urinary tract infection) 01/22/2024    Hyperglycemia 01/22/2024    Decreased oral intake 03/25/2024    Dysphagia 03/25/2024    Failure to thrive in adult 04/09/2024    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    Renal failure 06/18/2024    UTI (urinary tract infection), bacterial 06/18/2024    Anemia 06/27/2024    Acute on chronic anemia 08/17/2024    Elevated liver function tests 08/17/2024    Indwelling Mariscal catheter present 08/17/2024    Thyroid function test abnormal 08/17/2024    Dysphagia 08/17/2024    Malnutrition 08/17/2024    Feeding by G-tube 08/17/2024    Hyperlipidemia 08/17/2024    Hypothyroidism (acquired) 08/18/2024    Sacral wound 08/17/2024     Resolved Ambulatory Problems     Diagnosis Date Noted    Urine retention 12/20/2023    Constipation 12/20/2023    Leukocytosis 01/22/2024    Dehydration 01/22/2024    MARTITA (acute kidney injury) 01/22/2024    Altered mental state 01/22/2024    Encephalopathy 03/26/2024    ARF (acute renal failure) 06/18/2024     Past Medical History:   Diagnosis Date    Abscess of scrotum     Arthritis     AS (ankylosing spondylitis)     History of MRSA infection     Hypertension     Uveitis          PAST SURGICAL HISTORY  Past Surgical History:   Procedure Laterality Date    COLONOSCOPY      ENDOSCOPY W/ PEG TUBE PLACEMENT N/A 03/29/2024    Procedure: ESOPHAGOGASTRODUODENOSCOPY WITH PERCUTANEOUS ENDOSCOPIC GASTROSTOMY TUBE INSERTION;  Surgeon: Khadar Broderick MD;  Location: [facility] ENDOSCOPY;  Service: General;  Laterality: N/A;  PRE/POST - DYSPHAGIA    ROTATOR CUFF REPAIR Right     TOTAL HIP ARTHROPLASTY Left 2014    UPPER GASTROINTESTINAL ENDOSCOPY      WOUND DEBRIDEMENT N/A 08/20/2024    Procedure: DEBRIDEMENT SACRAL ULCER/WOUND;  Surgeon: Justine Roque MD;   Location: [facility] OR;  Service: General;  Laterality: N/A;    WOUND DEBRIDEMENT Left 08/20/2024    Procedure: LEFT DEBRIDEMENT FOOT;  Surgeon: Renny Duval DPM;  Location: [facility] OR;  Service: Podiatry;  Laterality: Left;         FAMILY HISTORY  Family History   Problem Relation Age of Onset    Alzheimer's disease Mother     Colon cancer Neg Hx     Colon polyps Neg Hx     Crohn's disease Neg Hx     Irritable bowel syndrome Neg Hx     Ulcerative colitis Neg Hx     Malig Hyperthermia Neg Hx          SOCIAL HISTORY  Social History     Socioeconomic History    Marital status:     Number of children: 2   Tobacco Use    Smoking status: Never    Smokeless tobacco: Never   Vaping Use    Vaping status: Never Used   Substance and Sexual Activity    Alcohol use: No    Drug use: No    Sexual activity: Defer         ALLERGIES  Patient has no known allergies.      REVIEW OF SYSTEMS  Unable to perform due to altered mental status      PHYSICAL EXAM    I have reviewed the triage vital signs and nursing notes.    ED Triage Vitals   Temp Heart Rate Resp BP SpO2   09/16/24 2308 09/16/24 2259 09/16/24 2259 09/16/24 2259 09/16/24 2259   97.8 °F (36.6 °C) (!) 126 (!) 30 (!) 59/36 93 %      Temp src Heart Rate Source Patient Position BP Location FiO2 (%)   -- -- -- -- --                GENERAL: awake, chronically ill-appearing, malnourished, mostly moans incomprehensible sounds but does follow some simple commands  HENT: Normocephalic, atraumatic, dry mucous membranes  EYES: no scleral icterus, eyes sunken  CV: regular rhythm, regular rate  RESPIRATORY: Mild tachypnea, lungs clear to auscultation bilaterally  ABDOMEN: soft, nonrigid, nontender throughout, PEG tube present  : Mariscal catheter in place with chronic ulceration of the urethra inferiorly along the shaft of the penis  MUSCULOSKELETAL: no deformity  NEURO: Awake, answers yes in response to his name but otherwise is nonverbal and unable to provide any  meaningful history.  SKIN: Warm, dry, large wound on the left heel pink moist granulation tissue          LAB RESULTS  Recent Results (from the past 24 hour(s))   ECG 12 Lead Altered Mental Status    Collection Time: 09/16/24 11:00 PM   Result Value Ref Range    QT Interval 318 ms    QTC Interval 444 ms   POC Glucose Once    Collection Time: 09/16/24 11:02 PM    Specimen: Blood   Result Value Ref Range    Glucose 98 70 - 130 mg/dL   Comprehensive Metabolic Panel    Collection Time: 09/16/24 11:57 PM    Specimen: Blood   Result Value Ref Range    Glucose 87 65 - 99 mg/dL    BUN 24 (H) 8 - 23 mg/dL    Creatinine 1.06 0.76 - 1.27 mg/dL    Sodium 137 136 - 145 mmol/L    Potassium 4.8 3.5 - 5.2 mmol/L    Chloride 101 98 - 107 mmol/L    CO2 23.0 22.0 - 29.0 mmol/L    Calcium 9.0 8.6 - 10.5 mg/dL    Total Protein 7.1 6.0 - 8.5 g/dL    Albumin 2.3 (L) 3.5 - 5.2 g/dL    ALT (SGPT) 24 1 - 41 U/L    AST (SGOT) 27 1 - 40 U/L    Alkaline Phosphatase 70 39 - 117 U/L    Total Bilirubin 0.4 0.0 - 1.2 mg/dL    Globulin 4.8 gm/dL    A/G Ratio 0.5 g/dL    BUN/Creatinine Ratio 22.6 7.0 - 25.0    Anion Gap 13.0 5.0 - 15.0 mmol/L    eGFR 70.9 >60.0 mL/min/1.73   Lipase    Collection Time: 09/16/24 11:57 PM    Specimen: Blood   Result Value Ref Range    Lipase 8 (L) 13 - 60 U/L   Procalcitonin    Collection Time: 09/16/24 11:57 PM    Specimen: Blood   Result Value Ref Range    Procalcitonin 68.40 (H) 0.00 - 0.25 ng/mL   High Sensitivity Troponin T    Collection Time: 09/16/24 11:57 PM    Specimen: Blood   Result Value Ref Range    HS Troponin T 85 (C) <22 ng/L   CBC Auto Differential    Collection Time: 09/16/24 11:57 PM    Specimen: Blood   Result Value Ref Range    WBC 6.51 3.40 - 10.80 10*3/mm3    RBC 3.55 (L) 4.14 - 5.80 10*6/mm3    Hemoglobin 9.7 (L) 13.0 - 17.7 g/dL    Hematocrit 30.4 (L) 37.5 - 51.0 %    MCV 85.6 79.0 - 97.0 fL    MCH 27.3 26.6 - 33.0 pg    MCHC 31.9 31.5 - 35.7 g/dL    RDW 15.7 (H) 12.3 - 15.4 %    RDW-SD 48.8  37.0 - 54.0 fl    MPV 8.8 6.0 - 12.0 fL    Platelets 345 140 - 450 10*3/mm3    nRBC 0.0 0.0 - 0.2 /100 WBC   Lactic Acid, Plasma    Collection Time: 09/16/24 11:57 PM    Specimen: Blood   Result Value Ref Range    Lactate 6.3 (C) 0.5 - 2.0 mmol/L   Manual Differential    Collection Time: 09/16/24 11:57 PM    Specimen: Blood   Result Value Ref Range    Neutrophil % 85.7 (H) 42.7 - 76.0 %    Lymphocyte % 5.1 (L) 19.6 - 45.3 %    Monocyte % 9.2 5.0 - 12.0 %    Eosinophil % 0.0 (L) 0.3 - 6.2 %    Basophil % 0.0 0.0 - 1.5 %    Neutrophils Absolute 5.58 1.70 - 7.00 10*3/mm3    Lymphocytes Absolute 0.33 (L) 0.70 - 3.10 10*3/mm3    Monocytes Absolute 0.60 0.10 - 0.90 10*3/mm3    Eosinophils Absolute 0.00 0.00 - 0.40 10*3/mm3    Basophils Absolute 0.00 0.00 - 0.20 10*3/mm3    Dacrocytes Mod/2+ None Seen    Poikilocytes Mod/2+ None Seen    Polychromasia Slight/1+ None Seen    Spherocytes Mod/2+ None Seen    WBC Morphology Normal Normal    Platelet Morphology Normal Normal   Blood Gas, Arterial -    Collection Time: 09/17/24 12:09 AM    Specimen: Arterial Blood   Result Value Ref Range    Site Right Radial     Tien's Test Positive     pH, Arterial 7.295 (C) 7.350 - 7.450 pH units    pCO2, Arterial 45.1 (H) 35.0 - 45.0 mm Hg    pO2, Arterial 68.0 (L) 80.0 - 100.0 mm Hg    HCO3, Arterial 21.9 (L) 22.0 - 28.0 mmol/L    Base Excess, Arterial -4.4 (L) 0.0 - 2.0 mmol/L    O2 Saturation, Arterial 91.0 (L) 92.0 - 98.5 %    CO2 Content 23.3 23 - 27 mmol/L    Barometric Pressure for Blood Gas 751.1000 mmHg    Modality NRB     Flow Rate 15.0000 lpm    Rate 21 Breaths/minute    Notified Who Chris Crooks MD     Read Back      Notified Time      Hemodilution No     Device Comment drawn@0008 Sat 94%    Urinalysis With Culture If Indicated - Urine, Catheter    Collection Time: 09/17/24  1:10 AM    Specimen: Urine, Catheter   Result Value Ref Range    Color, UA Yellow Yellow, Straw    Appearance, UA Cloudy (A) Clear    pH, UA 7.0 5.0 - 8.0     Specific Gravity, UA 1.011 1.005 - 1.030    Glucose, UA Negative Negative    Ketones, UA Negative Negative    Bilirubin, UA Negative Negative    Blood, UA Large (3+) (A) Negative    Protein,  mg/dL (2+) (A) Negative    Leuk Esterase, UA Moderate (2+) (A) Negative    Nitrite, UA Negative Negative    Urobilinogen, UA 0.2 E.U./dL 0.2 - 1.0 E.U./dL   Urinalysis, Microscopic Only - Urine, Catheter    Collection Time: 09/17/24  1:10 AM    Specimen: Urine, Catheter   Result Value Ref Range    RBC, UA Too Numerous to Count (A) None Seen, 0-2 /HPF    WBC, UA Too Numerous to Count (A) None Seen, 0-2 /HPF    Bacteria, UA None Seen None Seen /HPF    Squamous Epithelial Cells, UA 0-2 None Seen, 0-2 /HPF    Hyaline Casts, UA Too Numerous to Count None Seen /LPF    Methodology Automated Microscopy    Respiratory Panel PCR w/COVID-19(SARS-CoV-2) CALDERON/TRAM/KELLE/PAD/COR/JOSEPH In-House, NP Swab in UT/VTM, 2 HR TAT - Swab, Nasopharynx    Collection Time: 09/17/24  1:11 AM    Specimen: Nasopharynx; Swab   Result Value Ref Range    ADENOVIRUS, PCR Not Detected Not Detected    Coronavirus 229E Not Detected Not Detected    Coronavirus HKU1 Not Detected Not Detected    Coronavirus NL63 Not Detected Not Detected    Coronavirus OC43 Not Detected Not Detected    COVID19 Not Detected Not Detected - Ref. Range    Human Metapneumovirus Not Detected Not Detected    Human Rhinovirus/Enterovirus Not Detected Not Detected    Influenza A PCR Not Detected Not Detected    Influenza B PCR Not Detected Not Detected    Parainfluenza Virus 1 Not Detected Not Detected    Parainfluenza Virus 2 Not Detected Not Detected    Parainfluenza Virus 3 Not Detected Not Detected    Parainfluenza Virus 4 Not Detected Not Detected    RSV, PCR Not Detected Not Detected    Bordetella pertussis pcr Not Detected Not Detected    Bordetella parapertussis PCR Not Detected Not Detected    Chlamydophila pneumoniae PCR Not Detected Not Detected    Mycoplasma pneumo by PCR Not  Detected Not Detected   Blood Gas, Arterial -    Collection Time: 09/17/24  3:39 AM    Specimen: Arterial Blood   Result Value Ref Range    Site Right Brachial     Tien's Test N/A     pH, Arterial 7.243 (C) 7.350 - 7.450 pH units    pCO2, Arterial 64.2 (C) 35.0 - 45.0 mm Hg    pO2, Arterial 228.6 (H) 80.0 - 100.0 mm Hg    HCO3, Arterial 27.7 22.0 - 28.0 mmol/L    Base Excess, Arterial -0.5 (L) 0.0 - 2.0 mmol/L    O2 Saturation, Arterial 99.7 (H) 92.0 - 98.5 %    A-a DO2 0.3 mmHg    CO2 Content 29.7 (H) 23 - 27 mmol/L    Barometric Pressure for Blood Gas 750.0000 mmHg    Modality Adult Vent     FIO2 100 %    Ventilator Mode VC     Set Tidal Volume 500     Set Mech Resp Rate 18     Rate 18 Breaths/minute    PEEP 5     Notified Who Chris Crooks MD     Read Back Yes     Notified Time      Hemodilution No     Device Comment drawn @0338 Sat 100%     PO2/FIO2 229 0 - 500   POC Glucose Once    Collection Time: 09/17/24  4:19 AM    Specimen: Blood   Result Value Ref Range    Glucose 87 70 - 130 mg/dL   STAT Lactic Acid, Reflex    Collection Time: 09/17/24  5:16 AM    Specimen: Blood   Result Value Ref Range    Lactate 5.3 (C) 0.5 - 2.0 mmol/L   High Sensitivity Troponin T 2Hr    Collection Time: 09/17/24  5:16 AM    Specimen: Blood   Result Value Ref Range    HS Troponin T 50 (H) <22 ng/L    Troponin T Delta -35 (L) >=-4 - <+4 ng/L   Basic Metabolic Panel    Collection Time: 09/17/24  5:16 AM    Specimen: Blood   Result Value Ref Range    Glucose 84 65 - 99 mg/dL    BUN 23 8 - 23 mg/dL    Creatinine 0.84 0.76 - 1.27 mg/dL    Sodium 132 (L) 136 - 145 mmol/L    Potassium 5.3 (H) 3.5 - 5.2 mmol/L    Chloride 104 98 - 107 mmol/L    CO2 17.0 (L) 22.0 - 29.0 mmol/L    Calcium 8.3 (L) 8.6 - 10.5 mg/dL    BUN/Creatinine Ratio 27.4 (H) 7.0 - 25.0    Anion Gap 11.0 5.0 - 15.0 mmol/L    eGFR 88.2 >60.0 mL/min/1.73   Blood Gas, Arterial -    Collection Time: 09/17/24  6:41 AM    Specimen: Arterial Blood   Result Value Ref Range     Site Right Radial     Tien's Test Positive     pH, Arterial 7.428 7.350 - 7.450 pH units    pCO2, Arterial 37.8 35.0 - 45.0 mm Hg    pO2, Arterial 85.5 80.0 - 100.0 mm Hg    HCO3, Arterial 25.0 22.0 - 28.0 mmol/L    Base Excess, Arterial 0.7 0.0 - 2.0 mmol/L    O2 Saturation, Arterial 96.8 92.0 - 98.5 %    A-a DO2 0.3 mmHg    CO2 Content 26.1 23 - 27 mmol/L    Barometric Pressure for Blood Gas 751.2000 mmHg    Modality Adult Vent     FIO2 50 %    Ventilator Mode VC     Set Tidal Volume 541     Set Mech Resp Rate 22     Rate 25 Breaths/minute    PEEP 5     Hemodilution No     Device Comment 94%     PO2/FIO2 171 0 - 500       The above labs were ordered by me and independently reviewed by me.     RADIOLOGY  XR Abdomen KUB    Result Date: 9/17/2024  XR ABDOMEN KUB-  Clinical: Abdominal distention  FINDINGS: There is a greater amount of formed fecal material seen within the colon than typically seen consistent with constipation. Fecal material within the rectum appears fairly dense, probable fecal impaction. Bladder catheter in position. Nonobstructive bowel gas pattern seen. No gross indication of underlying free intraperitoneal air. G or J-tube superimposes the left mid abdomen. Remainder is unremarkable.  This report was finalized on 9/17/2024 6:36 AM by Dr. Vamsi Pettit M.D on Workstation: [id number]      XR Chest 1 View    Result Date: 9/17/2024  XR CHEST 1 VW-  Clinical: Ventilator management  COMPARISON examination 12:49 a.m., current examination 6:13 a.m.  FINDINGS: Endotracheal tube in position as before. Cardiac size within normal limits. Left lung consolidation appears similar to or slightly more pronounced compared to the previous examination. Patchy infiltrates within the right lung as before. No gross pleural effusion. No other interval change has occurred.  This report was finalized on 9/17/2024 6:32 AM by Dr. Vamsi Pettit M.D on Workstation: [id number]      CT Head Without Contrast    Result  Date: 9/17/2024  Patient: JADE MARAVILLA  Time Out: 06:01 Exam(s): CT HEAD Without Contrast EXAM:   CT Head Without Intravenous Contrast CLINICAL HISTORY:    Reason for exam: AMS. AMS. TECHNIQUE:   Axial computed tomography images of the head brain without intravenous contrast.  CTDI is 55.7 mGy and DLP is 988.9 mGy-cm.  This CT exam was performed according to the principle of ALARA (As Low As Reasonably Achievable) by using one or more of the following dose reduction techniques: automated exposure control, adjustment of the mA and or kV according to patient size, and or use of iterative reconstruction technique. COMPARISON:   CT Head dated june 18 2024 FINDINGS:   Brain:  No acute intracranial abnormality.  Consider MRI if there is further concern.  Areas of decreased attenuation in the deep cerebral white matter are consistent with small vessel ischemic degenerative changes.  The cerebral and cerebellar sulci are prominent consistent with brain atrophy.  No hemorrhage.   Ventricles:  Unremarkable.  No ventriculomegaly.   Bones joints:  Unremarkable.  No acute fracture.   Soft tissues:  Unremarkable.   Vasculature:  Atherosclerotic disease.   Sinuses:  Unremarkable as visualized.   Mastoid air cells:  Unremarkable as visualized.  No mastoid effusion. IMPRESSION:     1.  No acute intracranial abnormality.  Consider MRI if there is further concern. 2.  Small vessel ischemic degenerative changes. 3.  Cerebral and cerebellar atrophy.     Electronically signed by Landon Osborn MD on 09-17-24 at 0601    XR Chest 1 View    Result Date: 9/17/2024  Patient: JADE MARAVILLA  Time Out: 02:03 Exam(s): XR CXR 1 VIEW EXAM:   XR Chest, 1 View CLINICAL HISTORY:    Reason for exam: AMS. TECHNIQUE:   Frontal view of the chest. COMPARISON:   FINDINGS: See Impression. IMPRESSION: Similar lung opacities.    Electronically signed by Manasa Reina MD on 09-17-24 at 0203    XR Chest 1 View    Result Date: 9/17/2024  Patient:  JADE MARAVILLA  Time Out: 01:41 Exam(s): XR CXR 1 VIEW EXAM:   XR Chest, 1 View CLINICAL HISTORY:    Reason for exam: s p intubation. TECHNIQUE:   Frontal view of the chest. COMPARISON: 6 18 2024 FINDINGS: See Impression. IMPRESSION: ET tube terminates 3 cm from the gabo    Electronically signed by Manasa Reina MD on 09-17-24 at 0141     The above radiology studies were ordered by me.  See ED course for independent interpretations.     MEDICATIONS GIVEN IN ER  Medications   norepinephrine (LEVOPHED) 8 mg in 250 mL NS infusion (premix) (0.3 mcg/kg/min × 68 kg Intravenous New Bag 9/17/24 0530)   vasopressin (PITRESSIN) 20 units in 100 mL NS infusion (0.03 Units/min Intravenous Restarted 9/17/24 0620)   propofol (DIPRIVAN) infusion 10 mg/mL 100 mL (has no administration in time range)   fentaNYL citrate (PF) (SUBLIMAZE) injection 100 mcg (has no administration in time range)   nitroglycerin (NITROSTAT) SL tablet 0.4 mg (has no administration in time range)   sennosides-docusate (PERICOLACE) 8.6-50 MG per tablet 2 tablet (2 tablets Oral Not Given 9/17/24 0330)     And   polyethylene glycol (MIRALAX) packet 17 g (has no administration in time range)     And   bisacodyl (DULCOLAX) EC tablet 5 mg (has no administration in time range)     And   bisacodyl (DULCOLAX) suppository 10 mg (has no administration in time range)   acetaminophen (TYLENOL) tablet 650 mg (has no administration in time range)     Or   acetaminophen (TYLENOL) suppository 650 mg (has no administration in time range)   chlorhexidine (PERIDEX) 0.12 % solution 15 mL (has no administration in time range)   pantoprazole (PROTONIX) injection 40 mg (has no administration in time range)   meropenem (MERREM) 1,000 mg in sodium chloride 0.9 % 100 mL MBP (has no administration in time range)   atorvastatin (LIPITOR) tablet 40 mg (has no administration in time range)   levothyroxine (SYNTHROID, LEVOTHROID) tablet 25 mcg (has no administration in time range)    ipratropium-albuterol (DUO-NEB) nebulizer solution 3 mL (3 mL Nebulization Given 9/17/24 0640)   guaifenesin (ROBITUSSIN) 100 MG/5ML liquid 200 mg (has no administration in time range)   Pharmacy to dose vancomycin (has no administration in time range)   sodium chloride 0.9 % infusion (125 mL/hr Intravenous New Bag 9/17/24 0634)   Vancomycin HCl 1,250 mg in sodium chloride 0.9 % 250 mL VTB (has no administration in time range)   etomidate (AMIDATE) injection 20.4 mg (20.4 mg Intravenous Given by Other 9/17/24 0030)   rocuronium (ZEMURON) injection 70 mg (70 mg Intravenous Given by Other 9/17/24 0030)   sepsis fluid LR bolus 2,040 mL (2,040 mL Intravenous New Bag 9/16/24 2310)   Meropenem 2,000 mg in sodium chloride 0.9 % 100 mL MBP (0 mg Intravenous Stopped 9/17/24 0025)   lactated ringers bolus 1,000 mL (0 mL Intravenous Stopped 9/17/24 0247)         ORDERS PLACED DURING THIS VISIT:  Orders Placed This Encounter   Procedures    Intubation    Respiratory Panel PCR w/COVID-19(SARS-CoV-2) [facility]/TRAM/KELLE/PAD/COR/JOSEPH In-House, NP Swab in UTM/VTM, 2 HR TAT - Swab, Nasopharynx    Blood Culture - Blood,    Blood Culture - Blood,    Urine Culture - Urine,    Respiratory Culture - Lavage, ET Suction    Legionella Antigen, Urine - Urine, Indwelling Urethral Catheter    S. Pneumo Ag Urine or CSF - Urine, Indwelling Urethral Catheter    CT Head Without Contrast    XR Chest 1 View    XR Chest 1 View    XR Chest 1 View    XR Abdomen KUB    Comprehensive Metabolic Panel    Lipase    Procalcitonin    High Sensitivity Troponin T    CBC Auto Differential    Blood Gas, Arterial -    Lactic Acid, Plasma    Urinalysis With Culture If Indicated - Urine, Catheter    Manual Differential    STAT Lactic Acid, Reflex    High Sensitivity Troponin T 2Hr    Blood Gas, Arterial -    Basic Metabolic Panel    CBC Auto Differential    Urinalysis, Microscopic Only - Urine, Clean Catch    Blood Gas, Arterial -    STAT Lactic Acid, Reflex    Blood Gas,  Arterial -    NPO Diet NPO Type: Strict NPO    Straight Cath    Please replace Mariscal catheter and obtain urine specimen from newly placed catheter  Misc Nursing Order (Specify)    Target Arousal Level RASS -1 to -2    Vital Signs Every Hour and Per Hospital Policy Based on Patient Condition    Telemetry - Place Orders & Notify Provider of Results When Patient Experiences Acute Chest Pain, Dysrhythmia or Respiratory Distress    Continuous Pulse Oximetry    Height & Weight    Daily Weights    Intake & Output    Saline Lock & Maintain IV Access    Place Sequential Compression Device    Maintain Sequential Compression Device    Strict Bed Rest    G-Tube to Gravity    Elevate HOB    Oral Care & Teeth Brushing - Intubated Patient    Subglottic Suctioning Must Be Done Every 6 Hours    RN May Place Order For ABG As Needed for Respiratory Distress    May give meds through g-tube and clamp x 30 minutes then place back to gravity drainage  Nursing Communication    Adjust tidal volume 550  Misc Nursing Order (Specify)    Code Status and Medical Interventions: CPR (Attempt to Resuscitate); Full Support    Pulmonology (on-call MD unless specified)    IP Palliative Care Nurse Consult    Inpatient Infectious Diseases Consult    Capnography (ETCO2) Monitoring    Ventilator - Vent Mode: AC/VC; Rate: Other; Rate: 22; FiO2: 50%; PEEP: 5; Tidal Volume: mL; TV: 500    POC Glucose Once    POC Glucose Finger Q6H    POC Glucose Once    ECG 12 Lead Altered Mental Status    ECG 12 Lead Altered Mental Status    Wound Ostomy Eval & Treat    Insert Peripheral IV    Inpatient Admission    Inpatient Admission    CBC & Differential    CBC & Differential         OUTPATIENT MEDICATION MANAGEMENT:  Current Facility-Administered Medications Ordered in Epic   Medication Dose Route Frequency Provider Last Rate Last Admin    acetaminophen (TYLENOL) tablet 650 mg  650 mg Oral Q4H PRN Aissatou Tesfaye APRN        Or    acetaminophen (TYLENOL) suppository  650 mg  650 mg Rectal Q4H PRN Aissatou Tesfaye APRN        atorvastatin (LIPITOR) tablet 40 mg  40 mg Per PEG Tube Daily Aissatou Tesfaye APRN        sennosides-docusate (PERICOLACE) 8.6-50 MG per tablet 2 tablet  2 tablet Oral BID Aissatou Tesfaye APRN        And    polyethylene glycol (MIRALAX) packet 17 g  17 g Oral Daily PRN Aissatou Tesfaye APRN        And    bisacodyl (DULCOLAX) EC tablet 5 mg  5 mg Oral Daily PRN Aissatou Tesfaye APRN        And    bisacodyl (DULCOLAX) suppository 10 mg  10 mg Rectal Daily PRN Aissatou Tesfaye APRN        chlorhexidine (PERIDEX) 0.12 % solution 15 mL  15 mL Mouth/Throat Q12H Aissatou Tesfaye APRN        fentaNYL citrate (PF) (SUBLIMAZE) injection 100 mcg  100 mcg Intravenous Once Chris Crooks MD        guaifenesin (ROBITUSSIN) 100 MG/5ML liquid 200 mg  200 mg Per PEG Tube Q6H Aissatou Tesfaye APRN        ipratropium-albuterol (DUO-NEB) nebulizer solution 3 mL  3 mL Nebulization 4x Daily - RT Aissatou Tesfaye APRN   3 mL at 09/17/24 0640    levothyroxine (SYNTHROID, LEVOTHROID) tablet 25 mcg  25 mcg Per PEG Tube Q AM Aissatou Tesfaye APRN        meropenem (MERREM) 1,000 mg in sodium chloride 0.9 % 100 mL MBP  1,000 mg Intravenous Q8H Aissatou Tesfaye APRN        nitroglycerin (NITROSTAT) SL tablet 0.4 mg  0.4 mg Sublingual Q5 Min PRN Aissatou Tesfaye APRN        norepinephrine (LEVOPHED) 8 mg in 250 mL NS infusion (premix)  0.02-0.3 mcg/kg/min Intravenous Titrated Hang Nevarez MD 38.25 mL/hr at 09/17/24 0530 0.3 mcg/kg/min at 09/17/24 0530    pantoprazole (PROTONIX) injection 40 mg  40 mg Intravenous Q24H Aissatou Tesfaye, APRN        Pharmacy to dose vancomycin   Does not apply Continuous PRN Livier Vegas MD        propofol (DIPRIVAN) infusion 10 mg/mL 100 mL  5-50 mcg/kg/min Intravenous Titrated Chris Crooks MD        sodium chloride 0.9 % infusion  125 mL/hr Intravenous Continuous Livier Vegas  mL/hr at 09/17/24 0634 125  mL/hr at 09/17/24 0634    Vancomycin HCl 1,250 mg in sodium chloride 0.9 % 250 mL VTB  20 mg/kg Intravenous Once Livier Vegas MD        vasopressin (PITRESSIN) 20 units in 100 mL NS infusion  0.03 Units/min Intravenous Continuous Chris Crooks MD 9 mL/hr at 09/17/24 0620 0.03 Units/min at 09/17/24 0620     Current Outpatient Medications Ordered in Epic   Medication Sig Dispense Refill    acetaminophen (TYLENOL) 325 MG tablet Take 2 tablets by mouth Every 4 (Four) Hours As Needed for Mild Pain.      Ascorbic Acid (VITAMIN C PO) Daily.      atorvastatin (LIPITOR) 40 MG tablet       bisacodyl (DULCOLAX) 10 MG suppository Insert 1 suppository into the rectum Daily As Needed for Constipation (Use if bisacodyl oral is ineffective).      bisacodyl (DULCOLAX) 5 MG EC tablet Take 1 tablet by mouth Daily As Needed for Constipation (Use if polyethylene glycol is ineffective).      calcium carbonate (TUMS) 500 MG chewable tablet Chew 2 tablets 2 (Two) Times a Day As Needed for Heartburn.      cyclobenzaprine (FLEXERIL) 10 MG tablet TAKE 0.5   1 TABLET BY ORAL ROUTE AT BEDTIME AS NEEDED      Effer-K 20 MEQ effervescent tablet       folic acid (FOLVITE) 1 MG tablet Take 1 tablet by mouth Daily.      lansoprazole (PREVACID SOLUTAB) 30 MG Tablet Delayed Release Dispersible disintegrating tablet Administer 1 tablet per G tube Every Morning.      levothyroxine (SYNTHROID, LEVOTHROID) 25 MCG tablet       melatonin 3 MG tablet Take 1 tablet by mouth At Night As Needed for Sleep.      methotrexate 2.5 MG tablet Take 1 tablet by mouth 1 (One) Time Per Week. Take 2.5 mg on Wednesday and 2.5 mg on Thursday.  Do not take any medication on Friday through Tuesday.      metoprolol succinate XL (TOPROL-XL) 25 MG 24 hr tablet Take 1 tablet by mouth Daily.      polyethylene glycol (MIRALAX) 17 g packet Take 17 g by mouth Daily As Needed (Use if senna-docusate is ineffective).      polyethylene glycol (MIRALAX) 17 g packet Take 17 g by  mouth 2 (Two) Times a Day.      potassium chloride (KAYCIEL) 20 mEq/15 mL solution Take 15 mL by mouth Daily.      sennosides-docusate (PERICOLACE) 8.6-50 MG per tablet Take 2 tablets by mouth 2 (Two) Times a Day.      terazosin (HYTRIN) 1 MG capsule Administer 1 capsule per G tube Every Night.           PROCEDURES  Intubation    Date/Time: 9/17/2024 12:37 AM    Performed by: Chris Crooks MD  Authorized by: Chris Crooks MD    Consent:     Consent obtained:  Verbal (with patient, daughter)    Consent given by:  Patient  Pre-procedure details:     Indications: airway protection and respiratory failure      Patient status:  Awake    Neck mobility: reduced (ankylosing spondylitis)      Pharmacologic strategy: RSI      Induction agents:  Etomidate    Paralytics:  Rocuronium  Procedure details:     Preoxygenation:  Nonrebreather mask    CPR in progress: no      Number of attempts:  1  Successful intubation attempt details:     Intubation method:  Oral    Intubation technique: video assisted      Laryngoscope blade:  Hypercurved    Grade view: I      Tube size (mm):  8.0    Tube type:  Cuffed    Tube visualized through cords: yes    Placement assessment:     ETT at teeth/gumline (cm):  22    Tube secured with:  ETT haddad    Breath sounds:  Equal    Placement verification: chest rise, colorimetric ETCO2, direct visualization and equal breath sounds    Post-procedure details:     Procedure completion:  Tolerated well, no immediate complications    Complications: hypoxia    Comments:      Brief hypoxia to 70%, resolved within 30 seconds of intubation        Critical care provider statement:    Critical care time (minutes): 60.   Critical care time was exclusive of:  Separately billable procedures and treating other patients   Critical care was necessary to treat or prevent imminent or life-threatening deterioration of the following conditions:  Sepsis   Critical care was time spent personally by me on the  following activities:  Development of treatment plan with patient or surrogate, discussions with consultants, evaluation of patient's response to treatment, examination of patient, obtaining history from patient or surrogate, ordering and performing treatments and interventions, ordering and review of laboratory studies, ordering and review of radiographic studies, pulse oximetry, re-evaluation of patient's condition and review of old charts. Critical Care indicators: Sepsis / septicemia Pressors: dobutamine, dopamine, epinephrine, levophed, lopressor, et al.      PROGRESS, DATA ANALYSIS, CONSULTS, AND MEDICAL DECISION MAKING  All labs have been independently interpreted by me.  All radiology studies have been reviewed by me. All EKG's have been independently viewed and interpreted by me.  Discussion below represents my analysis of pertinent findings related to patient's condition, differential diagnosis, treatment plan and final disposition.    Differential diagnosis includes but is not limited to:  Pneumonia  UTI  Sepsis  Septic shock  Osteomyelitis      Clinical Scores:                  ED Course as of 09/17/24 0722   Mon Sep 16, 2024   2311 EKG          EKG time: 2300  Rhythm/Rate: Sinus tach, 117  P waves and WA: Normal  QRS, axis: Normal axis  ST and T waves: Borderline ST elevation laterally appears to be due to wandering baseline    Interpreted Contemporaneously by me, independently viewed  Similar compared to prior 8/22/2024       [JR]   2314 I reviewed recent bone culture from 8/20/2024 that grew E. coli, Proteus, Pseudomonas.  I also reviewed previous urine culture from 8/17/2024 that grew ESBL E. coli that was sensitive to ertapenem, meropenem, Zosyn, resistant to Levaquin. [JR]   2317 I had initially ordered ceftriaxone for empiric coverage however upon recent culture review it appears that meropenem would be more appropriate given his recent ESBL E. coli in the urine and also recent history of  Pseudomonas in his left ankle. [JR]   2319 I reviewed discharge summary from 8/28/2024.  Patient was admitted for abnormal lab and urinary tract infection.  He has a history of ankylosing spondylitis, BPH, chronic in [place] Mariscal catheter, hypertension, dysphagia with PEG tube for tube feedings, and worsening sacral and ischial pressure ulcers.  He underwent debridement of his wounds by general surgery and podiatry.  They recommended palliative care.  He will go to facility with hospice and do normal saline or Dakin's wet-to-dry dressing changing it twice daily and should wear protective boots for heel protection. [JR]   2350 Discussed with patient's daughter/medical POA by phone.  She states that he has been getting palliative care but is not enrolled in hospice.  She believes that he would want to be a full code. [JR]   Tue Sep 17, 2024   0010 Chest x-ray dependently interpreted PACS there is extensive infiltrate left lung field. [JR]   0013 I explained to the patient who is now more awake and alert that he has pneumonia and I am concerned that due to his hypoxic respiratory failure he does need intubation and needs to be placed on a ventilator.  He stated to do what ever needed to be done.  I also spoke with his daughter by phone and she is agreeable with treatment plan. [JR]   0035 Patient was intubated without difficulty.  He had brief hypoxia prior to intubation. [JR]   0121 Discussed with NOHEMI Reyez for pulmonology, who agrees to admit to the ICU on behalf of Dr. Vegas. [JR]      ED Course User Index  [JR] Chris Crooks MD             AS OF 07:22 EDT VITALS:    BP - 101/67  HR - 86  TEMP - 97.8 °F (36.6 °C)  O2 SATS - 96%    COMPLEXITY OF CARE  The patient requires admission.      Chronic or social conditions impacting patient care (Social Determinants of Health):     DIAGNOSIS  Final diagnoses:   Acute respiratory failure with hypoxia   Septic shock   Pneumonia due to infectious organism,  unspecified laterality, unspecified part of lung   Pressure injury of skin of sacral region, unspecified injury stage   Chronic heel ulcer, left, with unspecified severity   Ankylosing spondylitis, unspecified site of spine   Immobility   Indwelling Mariscal catheter present   Malnutrition, unspecified type   Dysphagia, unspecified type   Feeding by G-tube   Acute encephalopathy           DISPOSITION  ADMIT to ICU      Prescription drug monitoring program review:           Please note that portions of this document were completed with a voice recognition program.    Note Disclaimer: At [facility], we believe that sharing information builds trust and better relationships. You are receiving this note because you recently visited [facility]. It is possible you will see health information before a provider has talked with you about it. This kind of information can be easy to misunderstand. To help you fully understand what it means for your health, we urge you to discuss this note with your provider.         Chris Crooks MD  09/17/24 0722      Electronically signed by Chris Crooks MD at 09/17/24 0722       Shalonda Bonilla RN at 09/16/24 2253          Pt to er via ems from [facility] called for ams. Pt normally a/ox4 per staff. Pt responds to pain only at this time. Pt   hypotensive en route. Pt o2 70% on RA upon ems arrival. Unknown lkw.     Electronically signed by Shalonda Bonilla, RN at 09/16/24 2255       Vital Signs (last 2 days)       Date/Time Temp Temp src Pulse Resp BP Patient Position SpO2    09/18/24 0752 98.1 (36.7) Oral -- -- -- -- --    09/18/24 0729 -- -- 96 22 -- -- 100    09/18/24 0645 -- -- 98 -- 127/65 -- 100    09/18/24 0630 -- -- 93 -- 128/58 -- 100    09/18/24 0615 -- -- 96 -- 131/66 -- 100    09/18/24 0600 -- -- 100 -- 123/64 -- 100    09/18/24 0551 -- -- 100 -- 110/95 -- --    09/18/24 0545 -- -- 98 -- 110/95 -- 100    09/18/24 0530 -- -- 99 -- 128/70 --  100    09/18/24 0515 -- -- 97 -- 124/64 -- 100    09/18/24 0507 -- -- 96 -- 124/64 -- --    09/18/24 0500 -- -- 101 -- 124/64 -- 99    09/18/24 0400 98 (36.7) Oral 95 22 123/57 Lying 100    09/18/24 0330 -- -- 99 -- 123/59 -- 100    09/18/24 0300 -- -- 91 -- 121/54 -- 100    09/18/24 0200 -- -- 89 -- 106/57 -- 100    09/18/24 0145 -- -- 92 -- 106/66 -- 100    09/18/24 0130 -- -- 93 -- 104/53 -- 100    09/18/24 0115 -- -- 94 -- 106/64 -- 100    09/18/24 0102 -- -- 97 -- 115/58 -- --    09/18/24 0100 -- -- 97 -- 115/58 -- 99    09/18/24 0000 98 (36.7) Oral 92 22 106/56 Lying 100    09/17/24 2345 -- -- 93 -- 118/50 -- 100    09/17/24 2330 -- -- 99 -- 117/68 -- 100    09/17/24 2315 -- -- 96 -- 77/60 -- 100    09/17/24 2300 -- -- 92 -- 78/45 -- 100    09/17/24 2245 -- -- 96 -- 102/44 -- 99    09/17/24 2230 -- -- 100 -- 76/45 Lying 99    09/17/24 2224 -- -- 97 -- 90/45 -- --    09/17/24 2202 -- -- 99 -- 93/48 -- --    09/17/24 2200 -- -- 97 -- 93/48 -- 100    09/17/24 2139 -- -- 100 -- 97/49 -- --    09/17/24 2116 -- -- 101 -- 94/47 -- 100    09/17/24 2100 -- -- 101 -- 94/53 -- 100    09/17/24 2000 -- -- 96 -- 100/62 -- 100    09/17/24 1955 -- -- 93 22 -- -- 100    09/17/24 1930 -- -- 99 -- 95/51 Lying 100    09/17/24 1924 -- -- 98 -- 97/53 -- --    09/17/24 1915 -- -- 98 -- 74/50 -- 98    09/17/24 1900 -- -- 99 -- 107/84 -- 99    09/17/24 1845 -- -- 95 -- 102/53 -- 98    09/17/24 1815 -- -- 98 -- 103/52 -- 99    09/17/24 1800 -- -- 98 -- 107/59 -- 99    09/17/24 1745 -- -- 99 -- 105/60 -- 99    09/17/24 1730 -- -- 101 -- 118/97 -- 99    09/17/24 1715 -- -- 99 -- 106/65 -- 99    09/17/24 1700 -- -- 100 -- 116/62 -- 99    09/17/24 1645 -- -- 99 -- 109/67 -- 99    09/17/24 1630 -- -- 98 -- 106/60 -- 100    09/17/24 1615 -- -- 101 -- 101/84 -- 99    09/17/24 1600 -- -- 117 -- 114/72 -- 100    09/17/24 1545 -- -- 96 -- 121/72 -- 100    09/17/24 1530 -- -- 96 -- 111/74 -- 99    09/17/24 1520 98.1 (36.7) Oral 99 -- -- -- 100     09/17/24 1515 -- -- 96 22 102/79 -- 100    09/17/24 1502 -- -- 96 24 104/60 -- 99    09/17/24 1500 -- -- 95 -- -- -- 98    09/17/24 1445 -- -- 98 -- 95/70 -- 99    09/17/24 1345 -- -- 105 -- 120/57 -- 98    09/17/24 1330 -- -- 104 -- 106/53 -- 97    09/17/24 1315 -- -- 109 -- 107/63 -- 98    09/17/24 1300 -- -- 96 -- 100/57 -- 98    09/17/24 1245 -- -- 94 -- 104/51 -- 96    09/17/24 1230 -- -- 95 -- 101/52 -- 97    09/17/24 1215 -- -- 92 -- 97/70 -- 99    09/17/24 1200 98.1 (36.7) Oral 94 -- 102/72 -- 98    09/17/24 1145 -- -- 93 -- 102/63 -- 98    09/17/24 1130 -- -- 89 -- 107/57 -- 100    09/17/24 1115 -- -- 92 -- 102/65 -- 99    09/17/24 1100 -- -- 88 -- 102/65 -- 100    09/17/24 1045 -- -- 87 -- 104/67 -- 99    09/17/24 1042 -- -- 87 22 -- -- 99    09/17/24 1030 -- -- 98 -- 113/71 -- 99    09/17/24 1015 -- -- 87 -- 91/64 -- 97    09/17/24 1000 -- -- 86 -- 105/79 -- 99    09/17/24 0945 -- -- 88 -- 109/67 -- 97    09/17/24 0930 -- -- 88 -- 95/66 -- 99    09/17/24 0915 -- -- 93 -- 107/64 -- 98    09/17/24 0900 -- -- 87 -- 103/82 -- 97    09/17/24 0845 -- -- 87 -- 96/68 -- 95    09/17/24 0830 -- -- 83 -- 100/65 -- 98    09/17/24 0815 -- -- 85 -- 96/64 -- 97    09/17/24 0800 98.4 (36.9) Oral 85 -- 105/79 -- 98    09/17/24 0745 -- -- 87 -- 105/68 -- 96    09/17/24 0730 -- -- 90 -- 101/73 -- 96    09/17/24 0716 -- -- 86 -- 101/67 -- 96    09/17/24 0715 -- -- 85 -- 101/67 -- 97    09/17/24 0646 -- -- 84 -- 96/65 -- 95    09/17/24 0640 -- -- 87 25 -- -- 94    09/17/24 0634 -- -- -- -- 90/68 -- --    09/17/24 0620 -- -- 84 -- 73/51 -- --    09/17/24 0616 -- -- 83 -- 79/52 -- 93    09/17/24 0547 -- -- 89 -- -- -- 96    09/17/24 0546 -- -- -- -- 79/66 -- --    09/17/24 0530 -- -- 88 -- 95/77 -- --    09/17/24 0517 -- -- 95 -- -- -- 95    09/17/24 0516 -- -- -- -- 101/84 -- --    09/17/24 0509 -- -- 82 -- -- -- --    09/17/24 0507 -- -- -- -- 95/79 -- --    09/17/24 0454 -- -- 82 -- -- -- 97    09/17/24 0446 -- -- 86 --  85/52 -- 94    09/17/24 0434 -- -- 87 -- 84/46 -- --    09/17/24 0416 -- -- 88 -- 84/52 -- 94    09/17/24 0402 -- -- -- -- 81/45 -- --    09/17/24 0401 -- -- 90 -- 81/45 -- 93    09/17/24 0346 -- -- 90 -- 87/46 -- 97    09/17/24 0332 -- -- 90 -- -- -- 99    09/17/24 0331 -- -- -- -- 89/49 -- --    09/17/24 0231 -- -- 96 -- 103/54 -- 98    09/17/24 0037 -- -- 92 -- 84/58 -- 96    09/17/24 0000 -- -- 92 24 104/69 Lying 89    09/16/24 2353 -- -- 89 -- 74/35 -- 82    09/16/24 2331 -- -- 92 -- 93/70 -- 95    09/16/24 2308 97.8 (36.6) -- 107 -- -- -- 94    09/16/24 2259 -- -- 126 30 59/36 -- 93          Oxygen Therapy (last 2 days)       Date/Time SpO2 Device (Oxygen Therapy) Flow (L/min) Oxygen Concentration (%) ETCO2 (mmHg)    09/18/24 0729 100 ventilator -- 30 30    09/18/24 0645 100 -- -- -- --    09/18/24 0630 100 -- -- -- --    09/18/24 0615 100 -- -- -- --    09/18/24 0600 100 -- -- -- --    09/18/24 0545 100 -- -- -- --    09/18/24 0530 100 -- -- -- --    09/18/24 0515 100 -- -- -- --    09/18/24 0500 99 -- -- -- --    09/18/24 0400 100 ventilator -- 40 --    09/18/24 0330 100 ventilator -- 40 --    09/18/24 0300 100 -- -- -- --    09/18/24 0200 100 -- -- -- --    09/18/24 0145 100 -- -- -- --    09/18/24 0130 100 -- -- -- --    09/18/24 0115 100 -- -- -- --    09/18/24 0100 99 -- -- -- --    09/18/24 0013 -- ventilator -- 40 --    09/18/24 0000 100 ventilator -- -- --    09/17/24 2345 100 -- -- -- --    09/17/24 2330 100 -- -- -- --    09/17/24 2315 100 -- -- -- --    09/17/24 2300 100 -- -- -- --    09/17/24 2245 99 -- -- -- --    09/17/24 2230 99 ventilator -- 40 --    09/17/24 2200 100 -- -- -- --    09/17/24 2116 100 -- -- -- --    09/17/24 2100 100 -- -- -- --    09/17/24 2000 100 ventilator -- 40 --    09/17/24 1955 100 ventilator -- 40 --    09/17/24 1930 100 -- -- -- --    09/17/24 1915 98 -- -- -- --    09/17/24 1900 99 -- -- -- --    09/17/24 1845 98 -- -- -- --    09/17/24 1815 99 -- -- -- --     09/17/24 1800 99 -- -- -- --    09/17/24 1745 99 -- -- -- --    09/17/24 1730 99 -- -- -- --    09/17/24 1715 99 -- -- -- --    09/17/24 1700 99 -- -- -- --    09/17/24 1645 99 -- -- -- --    09/17/24 1630 100 -- -- -- --    09/17/24 1615 99 -- -- -- --    09/17/24 1600 100 ventilator -- 40 --    09/17/24 1545 100 -- -- -- --    09/17/24 1530 99 -- -- -- --    09/17/24 1520 100 -- -- -- --    09/17/24 1515 100 ventilator -- 40 --    09/17/24 1502 99 ventilator -- 40 --    09/17/24 1500 98 -- -- -- --    09/17/24 1445 99 -- -- -- --    09/17/24 1345 98 -- -- -- --    09/17/24 1330 97 -- -- -- --    09/17/24 1315 98 -- -- -- --    09/17/24 1300 98 -- -- -- --    09/17/24 1245 96 -- -- -- --    09/17/24 1230 97 -- -- -- --    09/17/24 1215 99 -- -- -- --    09/17/24 1200 98 ventilator -- 50 --    09/17/24 1145 98 -- -- -- --    09/17/24 1130 100 -- -- -- --    09/17/24 1115 99 -- -- -- --    09/17/24 1100 100 -- -- -- --    09/17/24 1045 99 -- -- -- --    09/17/24 1042 99 ventilator -- 50 --    09/17/24 1030 99 -- -- -- --    09/17/24 1015 97 -- -- -- --    09/17/24 1000 99 -- -- -- --    09/17/24 0945 97 -- -- -- --    09/17/24 0930 99 -- -- -- --    09/17/24 0915 98 -- -- -- --    09/17/24 0900 97 -- -- -- --    09/17/24 0845 95 -- -- -- --    09/17/24 0830 98 -- -- -- --    09/17/24 0815 97 -- -- -- --    09/17/24 0800 98 ventilator -- 50 --    09/17/24 0745 96 -- -- -- --    09/17/24 0730 96 -- -- -- --    09/17/24 0716 96 -- -- -- --    09/17/24 0715 97 -- -- -- --    09/17/24 0646 95 -- -- -- --    09/17/24 0640 94 ventilator -- 50 --    09/17/24 0616 93 -- -- -- --    09/17/24 0547 96 -- -- -- --    09/17/24 0517 95 ventilator -- -- --    09/17/24 0509 -- ventilator -- 50 --    09/17/24 0454 97 -- -- -- --    09/17/24 0446 94 -- -- -- --    09/17/24 0416 94 -- -- -- --    09/17/24 0401 93 -- -- -- --    09/17/24 0346 97 -- -- -- --    09/17/24 0332 99 -- -- -- --    09/17/24 0231 98 -- -- -- --    09/17/24 0037  96 ventilator -- 100 --    09/17/24 0000 89 -- -- -- --    09/16/24 2353 82 -- -- -- --    09/16/24 2331 95 -- -- -- --    09/16/24 2308 94 -- -- -- --    09/16/24 2259 93 nonrebreather mask 15 -- --          Lines, Drains & Airways       Active LDAs       Name Placement date Placement time Site Days    Peripheral IV 09/16/24 2258 Anterior;Left Forearm 09/16/24  2258  Forearm  1    Peripheral IV 09/16/24 2306 Anterior;Proximal;Right Forearm 09/16/24  2306  Forearm  1    Gastrostomy/Enterostomy Percutaneous endoscopic gastrostomy (PEG) 20 Fr. LUQ 03/29/24  0715  LUQ  173    Urethral Catheter Straight-tip 16 Fr. 08/17/24  0624  -- 32    Urethral Catheter 09/17/24  0108  -- 1    ETT  09/17/24  0032  -- 1              Inactive LDAs       Name Placement date Placement time Removal date Removal time Site Days    [REMOVED] Peripheral IV 08/21/24 1309 Anterior;Left Forearm 08/21/24  1309  09/16/24  2258  Forearm  26                  Facility-Administered Medications as of 9/18/2024   Medication Dose Route Frequency Provider Last Rate Last Admin    acetaminophen (TYLENOL) tablet 650 mg  650 mg Oral Q4H PRN Aissatou Tesfaye APRN        Or    acetaminophen (TYLENOL) suppository 650 mg  650 mg Rectal Q4H PRN Patrick Tesfayea A, APRN        atorvastatin (LIPITOR) tablet 40 mg  40 mg Per PEG Tube Daily Patrick Tesfayea MADISYN, APRN   40 mg at 09/17/24 0835    sennosides-docusate (PERICOLACE) 8.6-50 MG per tablet 2 tablet  2 tablet Oral BID Patrick Tesfayea A, APRN   2 tablet at 09/17/24 0835    And    polyethylene glycol (MIRALAX) packet 17 g  17 g Oral Daily PRN Patrick Tesfayea A, APRN        And    bisacodyl (DULCOLAX) EC tablet 5 mg  5 mg Oral Daily PRN Aissatou Tesfaye APRN        And    bisacodyl (DULCOLAX) suppository 10 mg  10 mg Rectal Daily PRN Aissatou Tesfaye APRN        chlorhexidine (PERIDEX) 0.12 % solution 15 mL  15 mL Mouth/Throat Q12H Aissatou Tesfaye APRN   15 mL at 09/17/24 2117    dextrose (D50W) (25 g/50 mL) IV  injection 25 g  25 g Intravenous Q15 Min PRN Margaret Carrizales MD   25 g at 09/18/24 0100    dextrose (GLUTOSE) oral gel 15 g  15 g Oral Q15 Min PRN Margaret Carrizales MD        Enoxaparin Sodium (LOVENOX) syringe 40 mg  40 mg Subcutaneous Q24H Margaret Carrizales MD   40 mg at 09/17/24 1659    [COMPLETED] etomidate (AMIDATE) injection 20.4 mg  0.3 mg/kg Intravenous Once Chris Crooks MD   20.4 mg at 09/17/24 0030    fentaNYL citrate (PF) (SUBLIMAZE) injection 100 mcg  100 mcg Intravenous Once Chris Crooks MD        fentaNYL citrate (PF) (SUBLIMAZE) injection 50 mcg  50 mcg Intravenous Q1H PRN Margaret Carrizales MD   50 mcg at 09/18/24 0154    glucagon (GLUCAGEN) injection 1 mg  1 mg Subcutaneous Q15 Min PRN Margaret Carrizales MD        guaifenesin (ROBITUSSIN) 100 MG/5ML liquid 200 mg  200 mg Per PEG Tube Q6H Aissatou Tesfaye APRN   200 mg at 09/18/24 0314    ipratropium-albuterol (DUO-NEB) nebulizer solution 3 mL  3 mL Nebulization 4x Daily - RT Aissatou Tesfaye APRN   3 mL at 09/18/24 0729    [COMPLETED] lactated ringers bolus 1,000 mL  1,000 mL Intravenous Once Chris Crooks MD   Stopped at 09/17/24 0247    levothyroxine (SYNTHROID, LEVOTHROID) tablet 25 mcg  25 mcg Per PEG Tube Q AM Aissatou Tesfaye APRN   25 mcg at 09/18/24 0543    meropenem (MERREM) 1,000 mg in sodium chloride 0.9 % 100 mL MBP  1,000 mg Intravenous Q8H Aissatou Tesfaye APRN   1,000 mg at 09/18/24 0543    [COMPLETED] Meropenem 2,000 mg in sodium chloride 0.9 % 100 mL MBP  2,000 mg Intravenous Once Chris Crooks MD   Stopped at 09/17/24 0025    nitroglycerin (NITROSTAT) SL tablet 0.4 mg  0.4 mg Sublingual Q5 Min PRN Aissatou Tesfaye APRN        norepinephrine (LEVOPHED) 8 mg in 250 mL NS infusion (premix)  0.02-0.3 mcg/kg/min Intravenous Titrated Hang Nevarez MD 25.5 mL/hr at 09/18/24 0626 0.2 mcg/kg/min at 09/18/24 0626    pantoprazole (PROTONIX) injection 40 mg  40 mg Intravenous Q24H Aissatou Tesfaye APRN   40 mg  at 09/17/24 0836    Pharmacy to dose vancomycin   Does not apply Continuous PRN Livier Vegas MD        propofol (DIPRIVAN) infusion 10 mg/mL 100 mL  5-50 mcg/kg/min Intravenous Titrated Chris Crooks MD 6.12 mL/hr at 09/18/24 0623 15 mcg/kg/min at 09/18/24 0623    [COMPLETED] rocuronium (ZEMURON) injection 70 mg  1 mg/kg Intravenous Once Chris Crooks MD   70 mg at 09/17/24 0030    [COMPLETED] sepsis fluid LR bolus 2,040 mL  30 mL/kg Intravenous Once Chris Crooks MD   2,040 mL at 09/16/24 2310    [COMPLETED] sepsis fluid NS 0.9 % bolus 2,040 mL  30 mL/kg Intravenous Once Margaret Carrizales MD   2,040 mL at 09/17/24 1447    [COMPLETED] sodium chloride 0.9 % bolus 1,000 mL  1,000 mL Intravenous Once Aissatou Tesfaye APRN 2,000 mL/hr at 09/17/24 2321 1,000 mL at 09/17/24 2321    sodium chloride 0.9 % infusion  125 mL/hr Intravenous Continuous Liveir Vegas  mL/hr at 09/18/24 0623 125 mL/hr at 09/18/24 0623    sodium hypochlorite (DAKIN'S 1/4 STRENGTH) 0.125 % topical solution 0.125% solution   Topical Q12H Livier Veags MD   Given at 09/17/24 2117    [COMPLETED] Vancomycin HCl 1,250 mg in sodium chloride 0.9 % 250 mL VTB  20 mg/kg Intravenous Once Livier Vegas  mL/hr at 09/17/24 0811 1,250 mg at 09/17/24 0811    Vancomycin Pharmacy Intermittent/Pulse Dosing   Does not apply Daily Livier Vegas MD        vasopressin (PITRESSIN) 20 units in 100 mL NS infusion  0.03 Units/min Intravenous Continuous Chris Crooks MD 9 mL/hr at 09/18/24 0623 0.03 Units/min at 09/18/24 0623     Orders (last 48 hrs)        Start     Ordered    09/18/24 0758  Manual Differential  Once         09/18/24 0757    09/18/24 0600  Vancomycin, Random  Morning Draw        Comments: Please draw vancomycin random level with AM labs on 9/18 09/17/24 0841    09/18/24 0600  CBC Auto Differential  PROCEDURE ONCE         09/17/24 2201    09/18/24 0508  STAT Lactic Acid, Reflex  PROCEDURE ONCE          09/17/24 2344    09/18/24 0451  POC Glucose Once  PROCEDURE ONCE        Comments: Complete no more than 45 minutes prior to patient eating      09/18/24 0444    09/18/24 0401  Blood Gas, Arterial -  PROCEDURE ONCE         09/18/24 0357    09/18/24 0118  POC Glucose Once  PROCEDURE ONCE        Comments: Complete no more than 45 minutes prior to patient eating      09/18/24 0116    09/18/24 0055  POC Glucose Once  PROCEDURE ONCE        Comments: Complete no more than 45 minutes prior to patient eating      09/18/24 0053    09/18/24 0000  sodium chloride 0.9 % bolus 1,000 mL  Once         09/17/24 2312    09/17/24 2238  STAT Lactic Acid, Reflex  PROCEDURE ONCE         09/17/24 1719    09/17/24 2100  sodium hypochlorite (DAKIN'S 1/4 STRENGTH) 0.125 % topical solution 0.125% solution  Every 12 Hours Scheduled         09/17/24 1610    09/17/24 2000  Wound Care  Every 12 Hours         09/17/24 1612 09/17/24 2000  POC Glucose Finger Q4H  Every 4 Hours      Comments: Complete no more than 45 minutes prior to patient eating      09/17/24 1812    09/17/24 1956  POC Glucose Once  PROCEDURE ONCE        Comments: Complete no more than 45 minutes prior to patient eating      09/17/24 1954    09/17/24 1858  POC Glucose Once  PROCEDURE ONCE        Comments: Complete no more than 45 minutes prior to patient eating      09/17/24 1847    09/17/24 1640  POC Glucose Once  PROCEDURE ONCE        Comments: Complete no more than 45 minutes prior to patient eating      09/17/24 1622    09/17/24 1633  Follow Pressure Ulcer Prevention Measures Policy  Continuous        Comments: Implement Appropriate Pressure Ulcer Prevention Measures  - Open Order Report to View Full Instructions  Enter Wound LDA & Document Assessment  Add Wound Care Plan  Add Patient Education Per Policy    09/17/24 1633    09/17/24 1633  Turn Patient  Now Then Every 2 Hours         09/17/24 1633    09/17/24 1633  Head of Bed 30 Degrees or Less (Unless Contraindicated)   Until Discontinued         09/17/24 1633    09/17/24 1633  Elevate Heels Off of Bed  Until Discontinued         09/17/24 1633    09/17/24 1633  Use Seat Cushion When Up In Chair  Continuous         09/17/24 1633    09/17/24 1633  Silicone Border Dressing to Bony Prominences  Every Shift       09/17/24 1633    09/17/24 1633  Do NOT Rub or Massage Any Bony Prominence  Continuous         09/17/24 1633    09/17/24 1600  Enoxaparin Sodium (LOVENOX) syringe 40 mg  Every 24 Hours         09/17/24 1432    09/17/24 1551  POC Glucose Once  PROCEDURE ONCE        Comments: Complete no more than 45 minutes prior to patient eating      09/17/24 1539    09/17/24 1532  Ventilator - Vent Mode: AC/PC; Rate: Other; Rate: 22; FiO2: 50%; PEEP: 5; Inspiratory Pressure: 15  Continuous         09/17/24 1532    09/17/24 1433  POC Glucose Once  PROCEDURE ONCE        Comments: Complete no more than 45 minutes prior to patient eating      09/17/24 1431    09/17/24 1427  STAT Lactic Acid, Reflex  PROCEDURE ONCE         09/17/24 1158    09/17/24 1353  sepsis fluid NS 0.9 % bolus 2,040 mL  Once         09/17/24 1337    09/17/24 1334  Manual Differential  Once         09/17/24 1333    09/17/24 1331  dextrose (GLUTOSE) oral gel 15 g  Every 15 Minutes PRN         09/17/24 1331    09/17/24 1331  dextrose (D50W) (25 g/50 mL) IV injection 25 g  Every 15 Minutes PRN         09/17/24 1331    09/17/24 1331  glucagon (GLUCAGEN) injection 1 mg  Every 15 Minutes PRN         09/17/24 1331    09/17/24 1331  POC Glucose Once  PROCEDURE ONCE        Comments: Complete no more than 45 minutes prior to patient eating      09/17/24 1330    09/17/24 1326  Pharmacy to Dose enoxaparin (LOVENOX)  Continuous PRN,   Status:  Discontinued         09/17/24 1326    09/17/24 1138  Manual Differential  Once,   Status:  Canceled         09/17/24 1137    09/17/24 1037  STAT Lactic Acid, Reflex  PROCEDURE ONCE         09/17/24 0548    09/17/24 1036  CBC Auto Differential   PROCEDURE ONCE         09/17/24 0112    09/17/24 0957  fentaNYL citrate (PF) (SUBLIMAZE) injection 50 mcg  Every 1 Hour PRN         09/17/24 0958    09/17/24 0900  pantoprazole (PROTONIX) injection 40 mg  Every 24 Hours Scheduled         09/17/24 0222    09/17/24 0900  atorvastatin (LIPITOR) tablet 40 mg  Daily         09/17/24 0229    09/17/24 0900  guaiFENesin (MUCINEX) 12 hr tablet 1,200 mg  Every 12 Hours Scheduled,   Status:  Discontinued         09/17/24 0244    09/17/24 0900  Vancomycin Pharmacy Intermittent/Pulse Dosing  Daily         09/17/24 0840    09/17/24 0840  MRSA Screen, PCR (Inpatient) - Swab, Nares  Once         09/17/24 0839    09/17/24 0830  ipratropium-albuterol (DUO-NEB) nebulizer solution 3 mL  4 Times Daily - RT         09/17/24 0244    09/17/24 0800  levothyroxine (SYNTHROID, LEVOTHROID) tablet 25 mcg  Every Early Morning         09/17/24 0229    09/17/24 0800  guaifenesin (ROBITUSSIN) 100 MG/5ML liquid 200 mg  Every 6 Hours,   Status:  Discontinued         09/17/24 0357    09/17/24 0800  guaifenesin (ROBITUSSIN) 100 MG/5ML liquid 200 mg  Every 6 Hours         09/17/24 0357    09/17/24 0757  Restraints Non-Violent or Non-Self Destructive  Calendar Day         09/17/24 0756    09/17/24 0716  Vancomycin HCl 1,250 mg in sodium chloride 0.9 % 250 mL VTB  Once         09/17/24 0700    09/17/24 0702  Inpatient Infectious Diseases Consult  IN AM        Specialty:  Infectious Diseases  Provider:  (Not yet assigned)    09/17/24 0333    09/17/24 0700  Wound Ostomy Eval & Treat  Once         09/17/24 0124    09/17/24 0645  Blood Gas, Arterial -  PROCEDURE ONCE         09/17/24 0641    09/17/24 0623  sodium chloride 0.9 % infusion  Continuous         09/17/24 0607    09/17/24 0608  Adjust tidal volume 550  Misc Nursing Order (Specify)  Once        Comments: Adjust tidal volume 550    09/17/24 0607 09/17/24 0606  Pharmacy to dose vancomycin  Continuous PRN         09/17/24 0606    09/17/24 0600  CBC &  Differential  Daily       09/17/24 0112    09/17/24 0600  Blood Gas, Arterial -  Daily       09/17/24 0112    09/17/24 0600  Basic Metabolic Panel  Daily       09/17/24 0112    09/17/24 0600  XR Chest 1 View  Daily       09/17/24 0222 09/17/24 0600  meropenem (MERREM) 1,000 mg in sodium chloride 0.9 % 100 mL MBP  Every 8 Hours         09/17/24 0227 09/17/24 0600  POC Glucose Finger Q6H  Every 6 Hours,   Status:  Canceled      Comments: Complete no more than 45 minutes prior to patient eating      09/17/24 0233    09/17/24 0500  ECG 12 Lead Altered Mental Status  Once         09/17/24 0319    09/17/24 0421  POC Glucose Once  PROCEDURE ONCE        Comments: Complete no more than 45 minutes prior to patient eating      09/17/24 0419    09/17/24 0420  May give meds through g-tube and clamp x 30 minutes then place back to gravity drainage  Nursing Communication  Once        Comments: May give meds through g-tube and clamp x 30 minutes then place back to gravity drainage    09/17/24 0420    09/17/24 0400  Oral Care & Teeth Brushing - Intubated Patient  Every 4 Hours      Comments: [place] Teeth at Least 2x/day    09/17/24 0222    09/17/24 0400  Legionella Antigen, Urine - Urine, Indwelling Urethral Catheter  Once         09/17/24 0359    09/17/24 0400  S. Pneumo Ag Urine or CSF - Urine, Indwelling Urethral Catheter  Once         09/17/24 0359    09/17/24 0356  Ventilator - Vent Mode: AC/VC; Rate: Other; Rate: 22; FiO2: Titrate Per SpO2; Titrate Oxygen for SpO2: 90 - 95%; PEEP: 5; Tidal Volume: mL; TV: 500  Continuous,   Status:  Canceled         09/17/24 0355 09/17/24 0356  Ventilator - Vent Mode: AC/VC; Rate: Other; Rate: 22; FiO2: 50%; PEEP: 5; Tidal Volume: mL; TV: 500  Continuous,   Status:  Canceled         09/17/24 0355    09/17/24 0355  Ventilator - Vent Mode: AC/VC; Rate: Other; Rate: 24; FiO2: Titrate Per SpO2; Titrate Oxygen for SpO2: 90 - 95%; PEEP: 5; Tidal Volume: mL; TV: 500  Continuous,   Status:   "Canceled         09/17/24 0354    09/17/24 0333  S. Pneumo Ag Urine or CSF - Urine, Urine, Clean Catch  Once,   Status:  Canceled         09/17/24 0332    09/17/24 0332  Legionella Antigen, Urine - Urine, Urine, Clean Catch  Once,   Status:  Canceled         09/17/24 0332    09/17/24 0322  XR Abdomen KUB  1 Time Imaging         09/17/24 0322    09/17/24 0257  STAT Lactic Acid, Reflex  PROCEDURE ONCE         09/17/24 0053    09/17/24 0238  chlorhexidine (PERIDEX) 0.12 % solution 15 mL  Every 12 Hours Scheduled         09/17/24 0222    09/17/24 0229  Respiratory Culture - Lavage, ET Suction  Once         09/17/24 0228 09/17/24 0225  Ventilator - Vent Mode: AC/VC; Rate: 20; FiO2: Titrate Per SpO2; Titrate Oxygen for SpO2: 90 - 95%; PEEP: 5; Tidal Volume: mL; TV: 500  Continuous,   Status:  Canceled         09/17/24 0224    09/17/24 0224  Blood Gas, Arterial -  STAT         09/17/24 0223    09/17/24 0222  Elevate HOB  Continuous         09/17/24 0222    09/17/24 0222  Subglottic Suctioning Must Be Done Every 6 Hours  Per Order Details        Comments: At Least Every 6 Hours    09/17/24 0222    09/17/24 0222  RN May Place Order For ABG As Needed for Respiratory Distress  Continuous         09/17/24 0222    09/17/24 0201  Urine Culture - Urine, Urine, Catheter  Once         09/17/24 0200    09/17/24 0200  Vital Signs Every Hour and Per Hospital Policy Based on Patient Condition  Every Hour       09/17/24 0112    09/17/24 0200  Intake & Output  Every Hour       09/17/24 0112    09/17/24 0157  High Sensitivity Troponin T 2Hr  PROCEDURE ONCE         09/17/24 0057    09/17/24 0128  sennosides-docusate (PERICOLACE) 8.6-50 MG per tablet 2 tablet  2 Times Daily        Placed in \"And\" Linked Group    09/17/24 0112    09/17/24 0128  lactated ringers infusion  Continuous,   Status:  Discontinued         09/17/24 0112    09/17/24 0126  G-Tube to [place]  Once         09/17/24 0125    09/17/24 0125  Sepsis Focused Exam Attestation " "Order  Once        Comments: I attest that I reassessed tissue perfusion after fluid resuscitation was completed    09/17/24 0124    09/17/24 0123  Urinalysis, Microscopic Only - Urine, Catheter  Once         09/17/24 0122    09/17/24 0122  Inpatient Admission  Once         09/17/24 0122    09/17/24 0113  Daily Weights  Daily       09/17/24 0112    09/17/24 0112  acetaminophen (TYLENOL) tablet 650 mg  Every 4 Hours PRN        Placed in \"Or\" Linked Group    09/17/24 0112    09/17/24 0112  acetaminophen (TYLENOL) suppository 650 mg  Every 4 Hours PRN        Placed in \"Or\" Linked Group    09/17/24 0112    09/17/24 0111  Place Sequential Compression Device  Once         09/17/24 0112    09/17/24 0111  Maintain Sequential Compression Device  Continuous         09/17/24 0112    09/17/24 0111  Capnography (ETCO2) Monitoring  Continuous         09/17/24 0112    09/17/24 0111  Strict Bed Rest  Until Discontinued         09/17/24 0112    09/17/24 0111  NPO Diet NPO Type: Strict NPO  Diet Effective Now         09/17/24 0112    09/17/24 0110  Inpatient Admission  Once         09/17/24 0109    09/17/24 0110  Continuous Cardiac Monitoring  Continuous        Comments: Follow Standing Orders As Outlined in Process Instructions (Open Order Report to View Full Instructions)    09/17/24 0112    09/17/24 0110  Maintain IV Access  Continuous,   Status:  Canceled         09/17/24 0112    09/17/24 0110  Telemetry - Place Orders & Notify Provider of Results When Patient Experiences Acute Chest Pain, Dysrhythmia or Respiratory Distress  Continuous        Comments: Open Order Report to View Parameters Requiring Provider Notification    09/17/24 0112    09/17/24 0110  Continuous Pulse Oximetry  Continuous         09/17/24 0112    09/17/24 0110  Height & Weight  Once         09/17/24 0112    09/17/24 0110  Oral Care - Patient Not on NPPV & Not Intubated  Every Shift,   Status:  Canceled       09/17/24 0112    09/17/24 0110  Insert Peripheral " "IV  Once         09/17/24 0112    09/17/24 0110  Saline Lock & Maintain IV Access  Continuous         09/17/24 0112    09/17/24 0110  Code Status and Medical Interventions: CPR (Attempt to Resuscitate); Full Support  Continuous         09/17/24 0112    09/17/24 0109  polyethylene glycol (MIRALAX) packet 17 g  Daily PRN        Placed in \"And\" Linked Group    09/17/24 0112    09/17/24 0109  bisacodyl (DULCOLAX) EC tablet 5 mg  Daily PRN        Placed in \"And\" Linked Group    09/17/24 0112    09/17/24 0109  bisacodyl (DULCOLAX) suppository 10 mg  Daily PRN        Placed in \"And\" Linked Group    09/17/24 0112    09/17/24 0109  nitroglycerin (NITROSTAT) SL tablet 0.4 mg  Every 5 Minutes PRN         09/17/24 0112    09/17/24 0052  lactated ringers bolus 1,000 mL  Once         09/17/24 0036    09/17/24 0038  IP Palliative Care Nurse Consult  Once        Provider:  (Not yet assigned)    09/17/24 0037    09/17/24 0038  Intubation  Once        Comments: This order was created via procedure documentation    09/17/24 0037    09/17/24 0037  propofol (DIPRIVAN) infusion 10 mg/mL 100 mL  Titrated         09/17/24 0021    09/17/24 0037  fentaNYL citrate (PF) (SUBLIMAZE) injection 100 mcg  Once         09/17/24 0021    09/17/24 0037  Target Arousal Level RASS -1 to -2  Continuous         09/17/24 0036    09/17/24 0037  Pulmonology (on-call MD unless specified)  Once        Specialty:  Pulmonary Disease  Provider:  (Not yet assigned)    09/17/24 0036    09/17/24 0035  XR Chest 1 View  1 Time Imaging         09/17/24 0034    09/17/24 0018  Manual Differential  Once         09/17/24 0017    09/17/24 0013  vasopressin (PITRESSIN) 20 units in 100 mL NS infusion  Continuous         09/16/24 2357    09/16/24 2332  Meropenem 2,000 mg in sodium chloride 0.9 % 100 mL MBP  Once         09/16/24 2316    09/16/24 2323  sepsis fluid LR bolus 2,040 mL  Once         09/16/24 2307    09/16/24 2321  cefTRIAXone (ROCEPHIN) 2,000 mg in sodium chloride " 0.9 % 100 mL MBP  Once,   Status:  Discontinued         09/16/24 2305    09/16/24 2318  Urinalysis With Culture If Indicated - Urine, Catheter  Once         09/16/24 2317 09/16/24 2317  etomidate (AMIDATE) injection 20.4 mg  Once         09/16/24 2301 09/16/24 2317  rocuronium (ZEMURON) injection 70 mg  Once         09/16/24 2301 09/16/24 2314  norepinephrine (LEVOPHED) 8 mg in 250 mL NS infusion (premix)  Titrated         09/16/24 2258    09/16/24 2305  POC Glucose Once  PROCEDURE ONCE        Comments: Complete no more than 45 minutes prior to patient eating      09/16/24 2302 09/16/24 2255  Please replace Mariscal catheter and obtain urine specimen from newly placed catheter  Misc Nursing Order (Specify)  Once        Comments: Please replace Mariscal catheter and obtain urine specimen from newly placed catheter    09/16/24 2254 09/16/24 2255  Blood Culture - Blood, Arm, Right  Once         09/16/24 2255    09/16/24 2255  Blood Culture - Blood, Arm, Left  Once         09/16/24 2255    09/16/24 2255  Lactic Acid, Plasma  Once         09/16/24 2255    09/16/24 2254  CBC & Differential  STAT         09/16/24 2253    09/16/24 2254  Comprehensive Metabolic Panel  STAT         09/16/24 2253    09/16/24 2254  Lipase  STAT         09/16/24 2253    09/16/24 2254  Urinalysis With Microscopic If Indicated (No Culture) - Urine, Catheter  Once,   Status:  Canceled         09/16/24 2253    09/16/24 2254  Straight Cath  Once         09/16/24 2253    09/16/24 2254  Procalcitonin  STAT         09/16/24 2253    09/16/24 2254  ECG 12 Lead Altered Mental Status  Once         09/16/24 2253    09/16/24 2254  High Sensitivity Troponin T  STAT         09/16/24 2253    09/16/24 2254  Cardiac Monitoring  Continuous,   Status:  Canceled         09/16/24 2253    09/16/24 2254  Continuous Pulse Oximetry  Continuous,   Status:  Canceled         09/16/24 2253    09/16/24 2254  CT Head Without Contrast  1 Time Imaging         09/16/24  2253 09/16/24 2254  XR Chest 1 View  1 Time Imaging         09/16/24 2253 09/16/24 2254  CBC Auto Differential  PROCEDURE ONCE         09/16/24 2253 09/16/24 2254  Blood Gas, Arterial -  STAT         09/16/24 2253 09/16/24 2254  Respiratory Panel PCR w/COVID-19(SARS-CoV-2) CALDERON/TRAM/KELLE/PAD/COR/JOSEPH In-House, NP Swab in UTM/VTM, 2 HR TAT - Swab, Nasopharynx  Once         09/16/24 2253    Unscheduled  Follow Hypoglycemia Standing Orders For Blood Glucose <70 & Notify Provider of Treatment  As Needed      Comments: Follow Hypoglycemia Orders As Outlined in Process Instructions (Open Order Report to View Full Instructions)  Notify Provider Any Time Hypoglycemia Treatment is Administered    09/17/24 1331    Unscheduled  Wound Care  As Needed      Comments: Apply Z Guard    09/17/24 1633    --  sodium hypochlorite (DAKIN'S 1/4 STRENGTH) 0.125 % solution topical solution 0.125%  3 times daily         09/17/24 0945    --  Potassium Bicarb-Citric Acid (Effer-K) 20 MEQ effervescent tablet  Daily         09/17/24 0945    --  HYDROcodone-acetaminophen (NORCO) 5-325 MG per tablet  3 Times Daily PRN         09/17/24 0945    --  Wound Dressings (Medihoney Wound/Burn Dressing) Paste  Daily         09/17/24 0949    --  collagenase 250 UNIT/GM ointment  Daily         09/17/24 0949                       Physician Progress Notes (last 48 hours)        Margaret Carrizales MD at 09/17/24 1249                                                        LOS: 0 days   Patient Care Team:  Keshav Eason MD as PCP - General (Internal Medicine)  Placido Shirley APRN as Referring Physician (Gastroenterology)  Rick Quarles MD as Consulting Physician (Hematology and Oncology)    Chief Complaint:  F/up respiratory failure, mechanical ventilation, critical care management, sepsis    Subjective   Interval History  I reviewed the admission note, progress notes, PMH, PSH, Family hx, social history, imagings and prior records on this  admission, summarized the finding in my note and formulated a transition of care plan.      On the ventilator with AC VC 22/550.  Overbreathing the ventilator.  Flow starvation noted and dyssynchrony.  Hypotensive on pressors.  HR improved.  BP now in normal range.  No fever.  He is alert on low-dose propofol and moves extremities but does not consistently follow commands.    REVIEW OF SYSTEMS:   Cannot obtain.  Patient is on the ventilator    Ventilator/Non-Invasive Ventilation Settings (From admission, onward)       Start     Ordered    09/17/24 0356  Ventilator - Vent Mode: AC/VC; Rate: Other; Rate: 22; FiO2: Titrate Per SpO2; Titrate Oxygen for SpO2: 90 - 95%; PEEP: 5; Tidal Volume: mL; TV: 500  Continuous,   Status:  Canceled        Question Answer Comment   Vent Mode AC/VC    Rate Other    Rate 22    FiO2 Titrate Per SpO2    Titrate Oxygen for SpO2 90 - 95%    PEEP 5    Tidal Volume mL            09/17/24 0355    09/17/24 0356  Ventilator - Vent Mode: AC/VC; Rate: Other; Rate: 22; FiO2: 50%; PEEP: 5; Tidal Volume: mL; TV: 500  Continuous        Question Answer Comment   Vent Mode AC/VC    Rate Other    Rate 22    FiO2 50%    PEEP 5    Tidal Volume mL            09/17/24 0355    09/17/24 0355  Ventilator - Vent Mode: AC/VC; Rate: Other; Rate: 24; FiO2: Titrate Per SpO2; Titrate Oxygen for SpO2: 90 - 95%; PEEP: 5; Tidal Volume: mL; TV: 500  Continuous,   Status:  Canceled        Question Answer Comment   Vent Mode AC/VC    Rate Other    Rate 24    FiO2 Titrate Per SpO2    Titrate Oxygen for SpO2 90 - 95%    PEEP 5    Tidal Volume mL            09/17/24 0354    09/17/24 0225  Ventilator - Vent Mode: AC/VC; Rate: 20; FiO2: Titrate Per SpO2; Titrate Oxygen for SpO2: 90 - 95%; PEEP: 5; Tidal Volume: mL; TV: 500  Continuous,   Status:  Canceled        Question Answer Comment   Vent Mode AC/VC    Rate 20    FiO2 Titrate Per SpO2    Titrate Oxygen for SpO2 90 - 95%    PEEP 5    Tidal Volume mL    TV  500        09/17/24 0224                          Physical Exam:     Vital Signs  Temp:  [97.8 °F (36.6 °C)-98.4 °F (36.9 °C)] 98.1 °F (36.7 °C)  Heart Rate:  [] 92  Resp:  [22-30] 22  BP: ()/(35-84) 97/70  FiO2 (%):  [50 %-100 %] 50 %    Intake/Output Summary (Last 24 hours) at 9/17/2024 1250  Last data filed at 9/17/2024 1141  Gross per 24 hour   Intake 968.75 ml   Output 700 ml   Net 268.75 ml     Flowsheet Rows      Flowsheet Row First Filed Value   Admission Height --   Admission Weight 68 kg (149 lb 14.6 oz) Documented at 09/16/2024 2100            PPE used per hospital policy    General Appearance:  On the ventilator.  Alert.  Cachectic.   ENMT: ET tube noted.  Extremities normal.   Eyes:  Pupils equal and reactive to light. EOMI   Neck:   Trachea midline. No thyromegaly.   Lungs:   Coarse anteriorly.  No wheezing.  Mild crackles    Heart:   Regular rhythm and normal rate, normal S1 and S2, no         murmur   Skin:   No rash or ecchymosis   Abdomen:   PEG tube noted.  Soft. No tenderness. No HSM.   Neuro/psych:  Conscious, alert.  Moves all extremities to stimulus but weak all over.  Exam done when he was on low-dose propofol.   Extremities:  No cyanosis, clubbing or edema.  Warm extremities and well-perfused.  Loss of muscle mass in arms and legs.          Results Review:        Results from last 7 days   Lab Units 09/17/24  0516 09/16/24 2357   SODIUM mmol/L 132* 137   POTASSIUM mmol/L 5.3* 4.8   CHLORIDE mmol/L 104 101   CO2 mmol/L 17.0* 23.0   BUN mg/dL 23 24*   CREATININE mg/dL 0.84 1.06   GLUCOSE mg/dL 84 87   CALCIUM mg/dL 8.3* 9.0     Results from last 7 days   Lab Units 09/17/24  0516 09/16/24 2357   HSTROP T ng/L 50* 85*     Results from last 7 days   Lab Units 09/17/24  1127 09/16/24 2357 09/11/24  0921   WBC 10*3/mm3 14.72* 6.51 6.59   HEMOGLOBIN g/dL 9.3* 9.7* 11.2*   HEMATOCRIT % 29.3* 30.4* 37.3*   PLATELETS 10*3/mm3 267 755 369                           Results from last 7 days    Lab Units 09/17/24  0641 09/17/24  0339 09/17/24  0009   PH, ARTERIAL pH units 7.428 7.243* 7.295*   PCO2, ARTERIAL mm Hg 37.8 64.2* 45.1*   PO2 ART mm Hg 85.5 228.6* 68.0*   O2 SATURATION ART % 96.8 99.7* 91.0*   FLOW RATE lpm  --   --  15.0000   MODALITY  Adult Vent Adult Vent NRB         I reviewed the patient's new clinical results.        Medication Review:   atorvastatin, 40 mg, Per PEG Tube, Daily  chlorhexidine, 15 mL, Mouth/Throat, Q12H  fentaNYL citrate (PF), 100 mcg, Intravenous, Once  guaifenesin, 200 mg, Per PEG Tube, Q6H  ipratropium-albuterol, 3 mL, Nebulization, 4x Daily - RT  levothyroxine, 25 mcg, Per PEG Tube, Q AM  meropenem, 1,000 mg, Intravenous, Q8H  pantoprazole, 40 mg, Intravenous, Q24H  senna-docusate sodium, 2 tablet, Oral, BID  Vancomycin Pharmacy Intermittent/Pulse Dosing, , Does not apply, Daily        norepinephrine, 0.02-0.3 mcg/kg/min, Last Rate: 0.3 mcg/kg/min (09/17/24 1152)  Pharmacy to dose vancomycin,   propofol, 5-50 mcg/kg/min, Last Rate: 10 mcg/kg/min (09/17/24 0948)  sodium chloride, 125 mL/hr, Last Rate: 125 mL/hr (09/17/24 0634)  vasopressin, 0.03 Units/min, Last Rate: Stopped (09/17/24 1115)        Diagnostic imaging:  I personally and independently reviewed the following images:  CXR 9/17/2024: Consolidation in the left lung.  Patchy infiltrates on the right.    Assessment     Septic shock, POA  Bilateral pneumonia  Acute hypoxemic and hypercapnic respiratory failure, requiring mechanical ventilation 9/16  Metabolic encephalopathy  Hypotension  Elevated troponin  Severe protein calorie malnutrition/hypoalbuminemia (albumin 2)  Slightly elevated troponin, probably non-STEMI type II  Positive MRSA screen  Mild hyperkalemia    Chronic anemia  Ankylosing spondylitis of cervical spine, on methotrexate  BPH  Chronic indwelling Mariscal catheter  HTN  Dysphagia with PEG tube  Failure to thrive  Hx osteomyelitis left calcaneus (culture 8/20 positive for Proteus mirabilis,  Pseudomonas and E. Coli)  Urine culture positive for ESBL 8/17/2024  Pressure ulcers: Left calcaneal, sacrum, ischial.  POA.  Debrided 8/20/2024  History of ESBL bacteremia 6/18/2024  Hypothyroidism    All problems new to me    Plan     Mechanical ventilation management: Due to dyssynchrony and flow starvation, I switched him from AC/VC to AC PC 16/15.  Continue mechanical ventilation for now.  When he is hemodynamically stable and his mental status improved then we could initiate weaning.  Check sputum culture  Ventilator bundle.  ICU core measures: Initiate PPI prophylaxis.  DVT prophylaxis with Lovenox.  Sedation with propofol.  Titrate for RASS -1-0  Analgesia with fentanyl as needed.  Started fentanyl 50 mcg every hour as needed.  Initiate tube feeding.  Bowel regimen as needed.  Pressors with Levophed and vasopressin.  Titrate to keep MAP >65.  Administer sepsis fluid bolus.  Antibiotics with meropenem and vancomycin pending cultures      Margaret Carrizales MD  09/17/24  12:50 EDT        Time: Critical care 45 min      This note was dictated utilizing Dragon dictation     Electronically signed by Margaret Carrizales MD at 09/17/24 7787

## 2024-09-18 NOTE — CASE MANAGEMENT/SOCIAL WORK
Discharge Planning Assessment  [facility]     Patient Name: Anthony Gallegos  MRN: [id number]  Today's Date: 9/18/2024    Admit Date: 9/16/2024    Plan: [facility] . Will need ems transport   Discharge Needs Assessment       Row Name 09/18/24 1315       Living Environment    People in Home facility resident    Name(s) of People in Home [facility]    Current Living Arrangements extended care facility    Potentially Unsafe Housing Conditions none    Primary Care Provided by other (see comments)  [facility]    Provides Primary Care For no one, unable/limited ability to care for self    Family Caregiver if Needed none    Quality of Family Relationships supportive    Able to Return to Prior Arrangements yes       Resource/Environmental Concerns    Resource/Environmental Concerns none    Transportation Concerns none       Transition Planning    Patient/Family Anticipates Transition to long-term care facility    Patient/Family Anticipated Services at Transition skilled nursing    Transportation Anticipated health plan transportation       Discharge Needs Assessment    Readmission Within the Last 30 Days unable to assess    Current Outpatient/Agency/Support Group skilled nursing facility    Equipment Currently Used at Home other (see comments)  [facility]    Concerns to be Addressed discharge planning    Equipment Needed After Discharge none    Discharge Facility/Level of Care Needs nursing facility, intermediate    Provided Post Acute Provider List? N/A    N/A Provider List Comment patient i LTC and daughter Whit wants return to [facility]    Provided Post Acute Provider Quality & Resource List? N/A    Current Discharge Risk chronically ill;dependent with mobility/activities of daily living                   Discharge Plan       Row Name 09/18/24 1317       Plan    Plan [facility] . Will need ems transport    Patient/Family in Agreement with Plan yes    Plan  Comments Spoke with daughter Whit over the phone. patient currently on vent. Facesheet, PCP and pahrmacy verified. Patient is lTC at [facility]. Per liaison Maddie, patient is LTC and can return at [place]. Daughter agreeable.                  Continued Care and Services - Admitted Since 9/16/2024       Destination       Service Provider Request Status Selected Services Address Phone Fax Patient Preferred    [facility] Accepted N/A [address] [id number] [phone] --                  Selected Continued Care - Prior Encounters Includes continued care and service providers with selected services from prior encounters from 6/18/2024 to 9/18/2024      Discharged on 8/28/2024 Admission date: 8/17/2024 - Discharge disposition: Intermediate Care       Destination       Service Provider Selected Services Address Phone Fax Patient Preferred    [facility] Skilled Nursing [address] [id number] [phone] --                      Discharged on 6/27/2024 Admission date: 6/17/2024 - Discharge disposition: Intermediate Care       Destination       Service Provider Selected Services Address Phone Fax Patient Preferred    [facility] Skilled Nursing [address] [place] [id number] [phone] --                          Expected Discharge Date and Time       Expected Discharge Date Expected Discharge Time    Sep 22, 2024            Demographic Summary       Row Name 09/18/24 1314       General Information    Admission Type inpatient    Arrived From emergency department    Required Notices Provided Important Message from Medicare    Referral Source admission list    Reason for Consult discharge planning    Preferred Language English                   Functional Status       Row Name 09/18/24 1314       Functional Status    Usual Activity Tolerance fair    Current Activity Tolerance  poor       Functional Status, IADL    Medications completely dependent    Meal Preparation completely dependent    Housekeeping completely dependent    Laundry completely dependent    Shopping completely dependent    IADL Comments [facility]       Mental Status    General Appearance WDL WDL       Mental Status Summary    Recent Changes in Mental Status/Cognitive Functioning unable to assess                               Gabriella Bunn RN

## 2024-09-18 NOTE — PLAN OF CARE
Goal Outcome Evaluation:      Patient opens eyes to voice when in room; ETT in place; no s/s of distress; patient in bilateral wrist restraints; BP on the soft side throughout night with maybe <65; Levo maxed and Vaso added back; 1L NS bolus given per NP request; Prop at 15; IV fluids remain at 125 mL/hr; F/C in place; PEG tube remains to gravity; wound care completed per orders; updates provided to family via telephone; will continue to monitor;   Problem: Skin and Tissue Injury (Mechanical Ventilation, Invasive)  Goal: Absence of Device-Related Skin and Tissue Injury  Intervention: Maintain Skin and Tissue Health  Recent Flowsheet Documentation  Taken 9/18/2024 0400 by Gina Dior RN  Device Skin Pressure Protection: pressure points protected  Taken 9/18/2024 0013 by Gina Dior RN  Device Skin Pressure Protection: pressure points protected  Taken 9/17/2024 2000 by Gina Dior RN  Device Skin Pressure Protection: pressure points protected     Problem: Ventilator-Induced Lung Injury (Mechanical Ventilation, Invasive)  Goal: Absence of Ventilator-Induced Lung Injury  Intervention: Prevent Ventilator-Associated Pneumonia  Recent Flowsheet Documentation  Taken 9/18/2024 0400 by Gina Dior RN  VAP Prevention Bundle:   HOB elevation maintained   oral care regularly provided  VAP Prevention Measures: completed  Taken 9/18/2024 0239 by Gina Dior RN  Head of Bed (HOB) Positioning: HOB at 60 degrees  Taken 9/18/2024 0013 by Gina Dior RN  Head of Bed (HOB) Positioning: HOB at 60 degrees  VAP Prevention Bundle:   HOB elevation maintained   oral care regularly provided  VAP Prevention Measures: completed  Taken 9/17/2024 2000 by Gina Dior RN  Head of Bed (HOB) Positioning: HOB at 45 degrees  VAP Prevention Bundle:   HOB elevation maintained   oral care regularly provided  VAP Prevention Measures: completed  Oral Care: teeth brushed - suction toothbrush

## 2024-09-18 NOTE — PROGRESS NOTES
[facility] Clinical Pharmacy Services: Vancomycin Monitoring Note    Anthony Gallegos is a 79 y.o. male who is on day 2/7 of pharmacy to dose vancomycin for Pneumonia and Skin and Soft Tissue.    Previous Vancomycin Dose:    1250 mg IV x1 on 9/17  Updated Cultures and Sensitivities: MRSA detected on PCR, bcx pending  Results from last 7 days   Lab Units 09/18/24  0708   VANCOMYCIN RM mcg/mL 5.30     Vitals/Labs  Ht:  ; Wt: 73.8 kg (162 lb 11.2 oz)   Temp Readings from Last 1 Encounters:   09/18/24 98.1 °F (36.7 °C) (Oral)     Estimated Creatinine Clearance: 120.6 mL/min (A) (by C-G formula based on SCr of 0.51 mg/dL (L)).     Results from last 7 days   Lab Units 09/18/24  0709 09/18/24  0708 09/17/24  1127 09/17/24  0516 09/16/24  2357   CREATININE mg/dL  --  0.51*  --  0.84 1.06   WBC 10*3/mm3 13.50*  --  14.72*  --  6.51     Assessment/Plan    Current Vancomycin Dose: 1000 mg IV every  12  hours; provides a predicted  mg/L.hr . CrCl back to baseline so scheduling vanc rather than dosing by levels.  Next Level Date and Time: Vanc Trough on 9/19 at 2200  We will continue to monitor patient changes and renal function     Thank you for involving pharmacy in this patient's care. Please contact pharmacy with any questions or concerns.       Bethany Babinski, PharmD  Clinical Pharmacist

## 2024-09-18 NOTE — CONSULTS
Nutrition Services    Patient Name:  Anthony Gallegos  YOB: 1945  MRN: [id number]  Admit Date:  9/16/2024  Assessment Date:  09/18/24    Summary: Nutrition screen  79-year-old gentleman with multiple medical issues including ankylosing spondylitis decubitus wounds chronic dysphagia with PEG tube placement presented to the emergency room from nursing facility with altered mental status,Intubated, hypotensive, sepsis, pneumonia  Meds: PPF 8.2(216kcals), levo, IVF's at 125ml/hr  Pt has been on Nutren 2.0 and Pureed Honey Thick Liquid diet  Labs: K 3.1, glu 63,  Meds: IVF's at 125ml/hr, levo, vasopressin, atorvastatin, peridex, lovenos, merrem, protonix, vanc, pericolace  Hx of wt loss noted.  Skin: coccyx stage 4, left gluteal incision, left scapula stage 3, right scapula PI unstageable, posterior penis    Patient meets ASPEN/AND criteria for nutrition diagnosis of severe malnutrition of chronic illness based on: NFPE, edema, and hx of wt loss.    Plan/Recommendations  Will follow for start of nutrition as medical conditions allow  If/when TF 's to begin, recommend Nutren 2.0 at 20ml/hr and advance to goal of 50ml/hr  Recommend Erich nutrition supplement BID  Will monitor labs/skin and replace electrolytes as needed  RD to follow      CLINICAL NUTRITION ASSESSMENT      Reason for Assessment Pressure Injury and/or Non-Healing Wound     Diagnosis/Problem    altered mental status,Intubated, hypotensive, sepsis, pneumonia     Medical/Surgical History Past Medical History:   Diagnosis Date    Abscess of scrotum     MARTITA (acute kidney injury)     Altered mental state     Arthritis     AS (ankylosing spondylitis)     History of MRSA infection     Hypertension     Leukocytosis     Tetrahydrocannabinol (THC) use disorder, mild, abuse 12/20/2023    Uveitis        Past Surgical History:   Procedure Laterality Date    COLONOSCOPY      ENDOSCOPY W/ PEG TUBE PLACEMENT N/A 03/29/2024    Procedure:  ESOPHAGOGASTRODUODENOSCOPY WITH PERCUTANEOUS ENDOSCOPIC GASTROSTOMY TUBE INSERTION;  Surgeon: Khadar Broderick MD;  Location: [facility] ENDOSCOPY;  Service: General;  Laterality: N/A;  PRE/POST - DYSPHAGIA    ROTATOR CUFF REPAIR Right     TOTAL HIP ARTHROPLASTY Left 2014    UPPER GASTROINTESTINAL ENDOSCOPY      WOUND DEBRIDEMENT N/A 08/20/2024    Procedure: DEBRIDEMENT SACRAL ULCER/WOUND;  Surgeon: Justine Roque MD;  Location: [facility] MAIN OR;  Service: General;  Laterality: N/A;    WOUND DEBRIDEMENT Left 08/20/2024    Procedure: LEFT DEBRIDEMENT FOOT;  Surgeon: Renny Duval DPM;  Location: [facility] MAIN OR;  Service: Podiatry;  Laterality: Left;        Anthropometrics        Current Height  Current Weight  BMI kg/m2    Weight: 73.8 kg (162 lb 11.2 oz) (09/18/24 0449)  Body mass index is 20.34 kg/m².   Adjusted BMI (if applicable)    BMI Category Normal/Healthy (18.4 - 24.9)   Ideal Body Weight (IBW) 186lb   Usual Body Weight (UBW) 140-160lb   Weight Trend Loss, Gain   Weight History Wt Readings from Last 30 Encounters:   09/18/24 0449 73.8 kg (162 lb 11.2 oz)   09/16/24 2100 68 kg (149 lb 14.6 oz)   09/11/24 0939 67.1 kg (148 lb)   08/30/24 1027 67.1 kg (148 lb)   08/28/24 0209 72.5 kg (159 lb 13.3 oz)   08/27/24 0500 72.2 kg (159 lb 2.8 oz)   08/26/24 0446 71.1 kg (156 lb 12 oz)   08/25/24 0313 74.5 kg (164 lb 3.9 oz)   08/24/24 1506 71.7 kg (158 lb)   08/24/24 0232 71.8 kg (158 lb 4.6 oz)   08/23/24 0604 71.9 kg (158 lb 8.2 oz)   08/22/24 0624 69.9 kg (154 lb 1.6 oz)   08/21/24 0518 73.8 kg (162 lb 11.2 oz)   08/20/24 0503 72.4 kg (159 lb 9.8 oz)   08/19/24 0618 70.4 kg (155 lb 3.3 oz)   08/17/24 0730 63.6 kg (140 lb 3.4 oz)   08/08/24 1044 63.6 kg (140 lb 4.8 oz)   06/27/24 0300 71 kg (156 lb 8.4 oz)   06/26/24 0500 71 kg (156 lb 8.4 oz)   06/25/24 0545 71 kg (156 lb 8.4 oz)   06/23/24 0600 60.4 kg (133 lb 2.5 oz)   06/22/24 0556 61.8 kg (136 lb 3.9 oz)   06/21/24 0500 62 kg (136 lb 11 oz)   06/20/24  0419 65.4 kg (144 lb 2.9 oz)   06/17/24 2351 63.1 kg (139 lb 1.6 oz)   04/09/24 0516 90.4 kg (199 lb 4.7 oz)   04/08/24 0420 90.5 kg (199 lb 8.3 oz)   04/07/24 0600 81 kg (178 lb 9.2 oz)   04/06/24 0500 80 kg (176 lb 5.9 oz)   04/03/24 0521 81.4 kg (179 lb 7.3 oz)   04/02/24 0435 80.4 kg (177 lb 4 oz)   04/01/24 0420 80.9 kg (178 lb 5.6 oz)   03/31/24 0530 82.7 kg (182 lb 6.4 oz)   03/30/24 0557 82.2 kg (181 lb 3.5 oz)   03/29/24 0548 82.6 kg (182 lb 1.6 oz)   03/28/24 0500 76.9 kg (169 lb 8 oz)   03/27/24 0617 76.4 kg (168 lb 6.4 oz)   03/25/24 2123 69.1 kg (152 lb 6.4 oz)   03/25/24 1516 79 kg (174 lb 2.6 oz)   01/31/24 0639 79 kg (174 lb 3.2 oz)   01/29/24 0509 78.8 kg (173 lb 11.6 oz)   01/28/24 0530 77.7 kg (171 lb 4.8 oz)   01/25/24 0537 76.4 kg (168 lb 6.9 oz)   01/23/24 0542 73.2 kg (161 lb 6 oz)   01/22/24 0502 75.1 kg (165 lb 9.1 oz)   01/21/24 2304 75.5 kg (166 lb 7.2 oz)   01/21/24 1744 81.6 kg (180 lb)   01/08/24 0253 81.6 kg (179 lb 14.3 oz)   01/05/24 0440 87.9 kg (193 lb 12.6 oz)   01/04/24 0439 85.6 kg (188 lb 11.4 oz)   01/03/24 0513 82.5 kg (181 lb 14.1 oz)   12/31/23 1300 80.5 kg (177 lb 7.5 oz)   12/31/23 0350 83.8 kg (184 lb 11.9 oz)   12/30/23 0511 85.3 kg (188 lb 0.8 oz)   12/29/23 0341 85.7 kg (188 lb 15 oz)   12/28/23 0435 85.4 kg (188 lb 4.4 oz)   12/27/23 0755 81 kg (178 lb 9.2 oz)   12/27/23 0423 85.4 kg (188 lb 4.4 oz)   12/25/23 0707 85.2 kg (187 lb 13.3 oz)   12/23/23 0608 81.3 kg (179 lb 3.7 oz)   12/22/23 0430 81.8 kg (180 lb 5.4 oz)   12/20/23 1005 81.9 kg (180 lb 8 oz)   03/27/18 1439 100 kg (221 lb)   03/22/18 1613 101 kg (222 lb 12.8 oz)   01/23/18 1414 101 kg (223 lb)   12/26/17 1111 99.8 kg (220 lb)   10/19/17 1239 95.3 kg (210 lb)   08/30/17 1459 91.3 kg (201 lb 3.2 oz)   08/29/17 1221 93 kg (205 lb)   08/10/17 1517 88 kg (194 lb)   07/14/17 1041 85.9 kg (189 lb 6.4 oz)   06/29/17 1318 84.5 kg (186 lb 3.2 oz)   06/21/17 1922 90.7 kg (200 lb)      --  Labs       Pertinent Labs     Results from last 7 days   Lab Units 09/18/24  0708 09/17/24  0516 09/16/24  2357   SODIUM mmol/L 140 132* 137   POTASSIUM mmol/L 3.1* 5.3* 4.8   CHLORIDE mmol/L 109* 104 101   CO2 mmol/L 21.9* 17.0* 23.0   BUN mg/dL 17 23 24*   CREATININE mg/dL 0.51* 0.84 1.06   CALCIUM mg/dL 7.7* 8.3* 9.0   BILIRUBIN mg/dL  --   --  0.4   ALK PHOS U/L  --   --  70   ALT (SGPT) U/L  --   --  24   AST (SGOT) U/L  --   --  27   GLUCOSE mg/dL 63* 84 87     Results from last 7 days   Lab Units 09/18/24  0709 09/17/24  1127 09/16/24  2357   HEMOGLOBIN g/dL 8.3*   < > 9.7*   HEMATOCRIT % 26.9*   < > 30.4*   WBC 10*3/mm3 13.50*   < > 6.51   ALBUMIN g/dL  --   --  2.3*    < > = values in this interval not displayed.     Results from last 7 days   Lab Units 09/18/24  0709 09/17/24  1127 09/16/24  2357   PLATELETS 10*3/mm3 248 267 345     COVID19   Date Value Ref Range Status   09/17/2024 Not Detected Not Detected - Ref. Range Final     Lab Results   Component Value Date    HGBA1C 5.80 (H) 01/23/2024          Medications           Scheduled Medications atorvastatin, 40 mg, Per PEG Tube, Daily  chlorhexidine, 15 mL, Mouth/Throat, Q12H  enoxaparin, 40 mg, Subcutaneous, Q24H  fentaNYL citrate (PF), 100 mcg, Intravenous, Once  guaifenesin, 200 mg, Per PEG Tube, Q6H  ipratropium-albuterol, 3 mL, Nebulization, 4x Daily - RT  levothyroxine, 25 mcg, Per PEG Tube, Q AM  meropenem, 1,000 mg, Intravenous, Q8H  pantoprazole, 40 mg, Intravenous, Q24H  senna-docusate sodium, 2 tablet, Per PEG Tube, BID  sodium hypochlorite, , Topical, Q12H  vancomycin, 1,000 mg, Intravenous, Q12H       Infusions norepinephrine, 0.02-0.3 mcg/kg/min, Last Rate: 0.2 mcg/kg/min (09/18/24 0626)  Pharmacy to dose vancomycin,   propofol, 5-50 mcg/kg/min, Last Rate: 15 mcg/kg/min (09/18/24 0623)  sodium chloride, 125 mL/hr, Last Rate: 125 mL/hr (09/18/24 0834)  vasopressin, 0.03 Units/min, Last Rate: 0.03 Units/min (09/18/24 0623)       PRN Medications   acetaminophen **OR**  acetaminophen    senna-docusate sodium **AND** polyethylene glycol **AND** bisacodyl **AND** bisacodyl    dextrose    dextrose    fentaNYL citrate (PF)    glucagon (human recombinant)    nitroglycerin    Pharmacy to dose vancomycin     Physical Findings          General Findings responds/arouses to voice, ventilator support   Oral/Mouth Cavity tooth or teeth missing   Edema  1+ (trace)   Gastrointestinal last bowel movement: 9/17   Skin  coccyx stage 4, left gluteal incision, left scapula stage 3, right scapula PI unstageable, posterior penis   Tubes/Drains/Lines PEG   NFPE See Malnutrition Severity Assessment, Date Completed: 9/18   --  Malnutrition Severity Assessment      Patient meets criteria for : Severe Malnutrition  Malnutrition Type (Last 8 Hours)       Malnutrition Severity Assessment       Row Name 09/18/24 1027       Malnutrition Severity Assessment    Malnutrition Type Chronic Disease - Related Malnutrition      Row Name 09/18/24 1027       Muscle Loss    Yarsanism Region Severe - deep hollowing/scooping, lack of muscle to touch, facial bones well defined    Clavicle Bone Region Severe - protruding prominent bone    Acromion Bone Region Moderate - acromion may slightly protrude      Row Name 09/18/24 1027       Fat Loss    Orbital Region  Severe - pronounced hollowness/depression, dark circles, loose saggy skin      Row Name 09/18/24 1027       Fluid Accumulation (Edema)    Fluid Accumulation  Mild equals 1+ pitting edema      Row Name 09/18/24 1027       Criteria Met (Must meet criteria for severity in at least 2 of these categories: M Wasting, Fat Loss, Fluid, Secondary Signs, Wt. Status, Intake)    Patient meets criteria for  Severe Malnutrition                       Estimated/Assessed Needs        Current Weight  Weight: 73.8 kg (162 lb 11.2 oz) (09/18/24 7282)       Energy Requirements    Weight for Calculation 73.8 kg   Method for Estimation  30-35 kcal/kg   EST Needs (kcal/day) 7214-4570        Protein Requirements    Weight for Calculation 73.8 kg   EST Protein Needs (g/kg) 1.5 gm/kg   EST Daily Needs (g/day) 110       Fluid Requirements     Method for Estimation 1 mL/kcal    EST Needs (mL/day)      Current Nutrition Orders & Evaluation of Intake       Oral Nutrition     Food Allergies NKFA   Current PO Diet NPO Diet NPO Type: Strict NPO   Supplement n/a   PO Evaluation     % PO Intake npo    Factors Affecting Intake: altered respiratory status   --  PES STATEMENT / NUTRITION DIAGNOSIS      Nutrition Dx Problem  Problem: Malnutrition (severe) and Increased Nutrient Needs  Etiology: Medical Diagnosis - respiratory failure on vent    Signs/Symptoms: NPO and Report/Observation     NUTRITION INTERVENTION / PLAN OF CARE      Intervention Goal(s) Improved nutrition related labs, Reduce/improve symptoms, Meet estimated needs, Disease management/therapy, Initiate TF/PN, Tolerate TF/PN at goal, and Maintain weight         RD Intervention/Action Await initiation of EN/PN, Continue to monitor, Care plan reviewed, and Recommend/order: TF's   --      Prescription/Orders:       PO Diet       Supplements       Enteral Nutrition       Parenteral Nutrition    New Prescription Ordered?    --   Enteral Prescription:     Enteral Route PEG    TF Delivery Method Continuous    Enteral Product Nutren 2.0    Modular Erich BID    Propofol Rate/Kcal     TF Start Rate  20 mL/hr    TF Goal Rate  50 mL/hr    Free Water Flush 30 mL Q 4 hr    Provision at Goal: The above end goal rate is for 22 hrs/day to assume interruptions for ADLs. Water flushes adjusted based on clinical picture + Rx flushes/IV fluids         Calories 2200 kcal plus erich, meets 100% needs         Protein  92 gm protein, meets 96% needs         Fluid (mL) 759 mL free water + 180 mL in flushes   Erich BID to provide 90 calories, 7 grams L-Arginine, 7 grams L-Glutamine and 2.5 grams Protein (Collagen)      Monitor/Evaluation Per protocol, I&O, Pertinent labs, EN  delivery/tolerance, Weight, Skin status, GI status, Symptoms, POC/GOC, Hemodynamic stability   Discharge Plan/Needs Pending clinical course   --    RD to follow per protocol.      Electronically signed by:  Tmaar Ott RD  09/18/24 10:10 EDT

## 2024-09-18 NOTE — SIGNIFICANT NOTE
09/18/24 1448   B = Both Spontaneous Awakening and Breathing Trials   Was patient receiving mechanical ventilation? Yes   Safety Screen Spontaneous Breathing Trial (SBT)  Proceed with SBT - No exclusion criteria met   Spontaneous Breathing Trial (SBT) Outcome Respiratory rate greater than 35/min - SBT Failure     Ended SBT

## 2024-09-19 NOTE — PROGRESS NOTES
LOS: 2 days   Patient Care Team:  Keshav Eason MD as PCP - General (Internal Medicine)  Placido Shirley APRN as Referring Physician (Gastroenterology)  Rick Quarles MD as Consulting Physician (Hematology and Oncology)    Chief Complaint:  F/up respiratory failure, mechanical ventilation, critical care management, sepsis    Subjective   Interval History    On Consultation with [facility].  Overbreathing the ventilator.  Only minimal bloody secretions from the ET tube.    On low-dose pressors but weaned off this morning when propofol was stopped.  On low-dose propofol earlier than it was held for weaning trial.  He follows commands and moves all extremities but appears to be very weak.    REVIEW OF SYSTEMS:   Cannot obtain.  Patient is on the ventilator    Ventilator/Non-Invasive Ventilation Settings (From admission, onward)       Start     Ordered    09/17/24 0356  Ventilator - Vent Mode: AC/VC; Rate: Other; Rate: 22; FiO2: Titrate Per SpO2; Titrate Oxygen for SpO2: 90 - 95%; PEEP: 5; Tidal Volume: mL; TV: 500  Continuous,   Status:  Canceled        Question Answer Comment   Vent Mode AC/VC    Rate Other    Rate 22    FiO2 Titrate Per SpO2    Titrate Oxygen for SpO2 90 - 95%    PEEP 5    Tidal Volume mL            09/17/24 0355    09/17/24 0356  Ventilator - Vent Mode: AC/VC; Rate: Other; Rate: 22; FiO2: 50%; PEEP: 5; Tidal Volume: mL; TV: 500  Continuous        Question Answer Comment   Vent Mode AC/VC    Rate Other    Rate 22    FiO2 50%    PEEP 5    Tidal Volume mL            09/17/24 0355    09/17/24 0355  Ventilator - Vent Mode: AC/VC; Rate: Other; Rate: 24; FiO2: Titrate Per SpO2; Titrate Oxygen for SpO2: 90 - 95%; PEEP: 5; Tidal Volume: mL; TV: 500  Continuous,   Status:  Canceled        Question Answer Comment   Vent Mode AC/VC    Rate Other    Rate 24    FiO2 Titrate Per SpO2    Titrate Oxygen for SpO2 90 - 95%    PEEP 5    Tidal Volume mL             09/17/24 0354    09/17/24 0225  Ventilator - Vent Mode: AC/VC; Rate: 20; FiO2: Titrate Per SpO2; Titrate Oxygen for SpO2: 90 - 95%; PEEP: 5; Tidal Volume: mL; TV: 500  Continuous,   Status:  Canceled        Question Answer Comment   Vent Mode AC/VC    Rate 20    FiO2 Titrate Per SpO2    Titrate Oxygen for SpO2 90 - 95%    PEEP 5    Tidal Volume mL            09/17/24 0224                          Physical Exam:     Vital Signs  Temp:  [97.7 °F (36.5 °C)-98.9 °F (37.2 °C)] 97.7 °F (36.5 °C)  Heart Rate:  [] 103  Resp:  [15-29] 22  BP: ()/(39-71) 106/56  FiO2 (%):  [25 %-26 %] 25 %    Intake/Output Summary (Last 24 hours) at 9/19/2024 1526  Last data filed at 9/19/2024 0447  Gross per 24 hour   Intake 1865 ml   Output 1450 ml   Net 415 ml     Flowsheet Rows      Flowsheet Row First Filed Value   Admission Height --   Admission Weight 68 kg (149 lb 14.6 oz) Documented at 09/16/2024 2100            PPE used per hospital policy    General Appearance:  On the ventilator.  Alert.  Cachectic.   ENMT: ET tube noted.  Extremities normal.   Eyes:  Pupils equal and reactive to light. EOMI   Neck:   Trachea midline. No thyromegaly.   Lungs:   Coarse anteriorly.  No wheezing.  Mild crackles    Heart:   Regular rhythm and normal rate, normal S1 and S2, no         murmur   Skin:   No rash or ecchymosis   Abdomen:   PEG tube noted.  Soft. No tenderness. No HSM.   Neuro/psych: Somnolent.  Arouses to stimulus.  Moves extremities.   Extremities:  No cyanosis, clubbing or edema.  Warm extremities and well-perfused.  Loss of muscle mass in arms and legs.          Results Review:        Results from last 7 days   Lab Units 09/19/24  0446 09/18/24  0708 09/17/24  0516   SODIUM mmol/L 145 140 132*   POTASSIUM mmol/L 2.3* 3.1* 5.3*   CHLORIDE mmol/L 114* 109* 104   CO2 mmol/L 24.3 21.9* 17.0*   BUN mg/dL 10 17 23   CREATININE mg/dL 0.41* 0.51* 0.84   GLUCOSE mg/dL 92 63* 84   CALCIUM mg/dL 7.3* 7.7* 8.3*      Results from last 7 days   Lab Units 09/17/24  0516 09/16/24  2357   HSTROP T ng/L 50* 85*     Results from last 7 days   Lab Units 09/19/24  0446 09/18/24  0709 09/17/24  1127   WBC 10*3/mm3 15.38* 13.50* 14.72*   HEMOGLOBIN g/dL 8.2* 8.3* 9.3*   HEMATOCRIT % 27.3* 26.9* 29.3*   PLATELETS 10*3/mm3 252 248 267                           Results from last 7 days   Lab Units 09/19/24  0341 09/18/24  0357 09/17/24  0641 09/17/24  0339 09/17/24  0009   PH, ARTERIAL pH units 7.503* 7.420 7.428 7.243* 7.295*   PCO2, ARTERIAL mm Hg 28.8* 36.0 37.8 64.2* 45.1*   PO2 ART mm Hg 67.0* 109.1* 85.5 228.6* 68.0*   O2 SATURATION ART % 95.0 98.4 96.8 99.7* 91.0*   FLOW RATE lpm  --   --   --   --  15.0000   MODALITY  Adult Vent Adult Vent Adult Vent Adult Vent NRB         I reviewed the patient's new clinical results.        Medication Review:   atorvastatin, 40 mg, Per PEG Tube, Daily  chlorhexidine, 15 mL, Mouth/Throat, Q12H  enoxaparin, 40 mg, Subcutaneous, Q24H  fentaNYL citrate (PF), 100 mcg, Intravenous, Once  guaifenesin, 200 mg, Per PEG Tube, Q6H  ipratropium-albuterol, 3 mL, Nebulization, 4x Daily - RT  levothyroxine, 25 mcg, Per PEG Tube, Q AM  meropenem, 1,000 mg, Intravenous, Q8H  pantoprazole, 40 mg, Intravenous, Q24H  senna-docusate sodium, 2 tablet, Per PEG Tube, BID  sodium hypochlorite, , Topical, Q12H  vancomycin, 1,000 mg, Intravenous, Q12H        dexmedetomidine, 0.2-1.5 mcg/kg/hr, Last Rate: 0.2 mcg/kg/hr (09/19/24 1446)  norepinephrine, 0.02-0.3 mcg/kg/min, Last Rate: 0.08 mcg/kg/min (09/19/24 1349)  Pharmacy to dose vancomycin,   propofol, 5-50 mcg/kg/min, Last Rate: Stopped (09/19/24 1149)  sodium chloride, 125 mL/hr, Last Rate: 125 mL/hr (09/19/24 0843)  vasopressin, 0.03 Units/min, Last Rate: Stopped (09/18/24 1200)        Diagnostic imaging:  I personally and independently reviewed the following images:  CXR 9/17/2024: Consolidation in the left lung.  Patchy infiltrates on the right.    CXR 9/18/2024:  Bilateral pulmonary infiltrates/pneumonia mostly on the left.    Assessment     Septic shock, POA  Bilateral pneumonia, MRSA  Acute hypoxemic and hypercapnic respiratory failure, requiring mechanical ventilation 9/16  Metabolic encephalopathy  Lactic acidosis, resolved  Elevated troponin  Severe protein calorie malnutrition/hypoalbuminemia (albumin 2)  Slightly elevated troponin, probably non-STEMI type II  Positive MRSA screen  Hypokalemia    Chronic anemia  Ankylosing spondylitis of cervical spine, on methotrexate  BPH  Chronic indwelling Mariscal catheter  HTN  Dysphagia with PEG tube  Failure to thrive  Hx osteomyelitis left calcaneus (culture 8/20 positive for Proteus mirabilis, Pseudomonas and E. Coli)  Urine culture positive for ESBL 8/17/2024  Pressure ulcers: Left calcaneal, sacrum, ischial.  POA.  Debrided 8/20/2024  History of ESBL bacteremia 6/18/2024  Hypothyroidism      Plan     Mechanical ventilation management: Settings reviewed and adjusted.  Placed on spontaneous mode with a pressure support and he was able to tolerate for short period of time but then became tachypneic.  Not quite sure how much of that was related to being off sedation.  He was also noted to have low NIF.  Regardless, he failed the trial.  Will repeat again tomorrow.  Will switch him to Precedex tomorrow to facilitate extubation.  Continue vancomycin for pneumonia.  Discontinue meropenem.  Ventilator bundle.  ICU core measures: Initiate PPI prophylaxis.  DVT prophylaxis with Lovenox.  Mucinex  fentanyl 50 mcg every hour as needed.  Continue tube feeding  Replace potassium  Levophed IV drip.  Titrate to keep MAP >65        Margaret Carrizales MD  09/19/24  15:26 EDT        Time: Critical care 35 min      This note was dictated utilizing Dragon dictation

## 2024-09-19 NOTE — SIGNIFICANT NOTE
09/19/24 0738   B = Both Spontaneous Awakening and Breathing Trials   Was patient receiving mechanical ventilation? Yes   Safety Screen Spontaneous Breathing Trial (SBT)  Proceed with SBT - No exclusion criteria met   Spontaneous Breathing Trial (SBT) Outcome Respiratory rate greater than 35/min - SBT Failure

## 2024-09-20 NOTE — PROGRESS NOTES
[facility] Clinical Pharmacy Services: Vancomycin Monitoring Note    Anthony Gallegos is a 79 y.o. male who is on day 3/7 of pharmacy to dose vancomycin for Pneumonia and Skin and Soft Tissue.    Previous Vancomycin Dose:  1000 mg every 12 hrs  Updated Cultures and Sensitivities: MRSA detected on PCR, bcx pending  Results from last 7 days   Lab Units 09/19/24  2212 09/18/24  0708   VANCOMYCIN RM mcg/mL  --  5.30   VANCOMYCIN TR mcg/mL 12.70  --      Vitals/Labs  Ht:  ; Wt: 72.8 kg (160 lb 7.9 oz)   Temp Readings from Last 1 Encounters:   09/19/24 98.3 °F (36.8 °C) (Oral)     Estimated Creatinine Clearance: 148 mL/min (A) (by C-G formula based on SCr of 0.41 mg/dL (L)).     Results from last 7 days   Lab Units 09/19/24  0446 09/18/24  0709 09/18/24  0708 09/17/24  1127 09/17/24  0516   CREATININE mg/dL 0.41*  --  0.51*  --  0.84   WBC 10*3/mm3 15.38* 13.50*  --  14.72*  --      Assessment/Plan    Current Vancomycin Dose: continuing 1000 mg IV every  12  hours; provides a predicted  mg/L.hr   Next Level Date and Time: No vanc levels ordered at this time - will defer to AM clinical  We will continue to monitor patient changes and renal function     Thank you for involving pharmacy in this patient's care. Please contact pharmacy with any questions or concerns.       Gail Zamora [facility]  Clinical Pharmacist

## 2024-09-20 NOTE — PLAN OF CARE
Problem: Aspiration (Enteral Nutrition)  Goal: Absence of Aspiration Signs and Symptoms  Outcome: Ongoing, Progressing     Problem: Device-Related Complication Risk (Enteral Nutrition)  Goal: Safe, Effective Therapy Delivery  Outcome: Ongoing, Progressing     Problem: Feeding Intolerance (Enteral Nutrition)  Goal: Feeding Tolerance  Outcome: Ongoing, Progressing   Goal Outcome Evaluation:      TF's per MD order  RD to follow

## 2024-09-20 NOTE — PROGRESS NOTES
LOS: 3 days   Patient Care Team:  Keshav Eason MD as PCP - General (Internal Medicine)  Placido Shirley APRN as Referring Physician (Gastroenterology)  Rick Quarles MD as Consulting Physician (Hematology and Oncology)    Chief Complaint:  F/up respiratory failure, mechanical ventilation, critical care management, sepsis    Subjective   Interval History    No fever.  On sedation with Precedex.  Was tachypneic even prior to initiation of SBT.  Currently on AC PC.  No significant secretions from the ET tube.  No fever.    Remains on low-dose Levophed.    On TF and tolerating well.    REVIEW OF SYSTEMS:   Cannot obtain.  Patient is on the ventilator    Ventilator/Non-Invasive Ventilation Settings (From admission, onward)       Start     Ordered    09/17/24 0356  Ventilator - Vent Mode: AC/VC; Rate: Other; Rate: 22; FiO2: Titrate Per SpO2; Titrate Oxygen for SpO2: 90 - 95%; PEEP: 5; Tidal Volume: mL; TV: 500  Continuous,   Status:  Canceled        Question Answer Comment   Vent Mode AC/VC    Rate Other    Rate 22    FiO2 Titrate Per SpO2    Titrate Oxygen for SpO2 90 - 95%    PEEP 5    Tidal Volume mL            09/17/24 0355    09/17/24 0356  Ventilator - Vent Mode: AC/VC; Rate: Other; Rate: 22; FiO2: 50%; PEEP: 5; Tidal Volume: mL; TV: 500  Continuous        Question Answer Comment   Vent Mode AC/VC    Rate Other    Rate 22    FiO2 50%    PEEP 5    Tidal Volume mL            09/17/24 0355    09/17/24 0355  Ventilator - Vent Mode: AC/VC; Rate: Other; Rate: 24; FiO2: Titrate Per SpO2; Titrate Oxygen for SpO2: 90 - 95%; PEEP: 5; Tidal Volume: mL; TV: 500  Continuous,   Status:  Canceled        Question Answer Comment   Vent Mode AC/VC    Rate Other    Rate 24    FiO2 Titrate Per SpO2    Titrate Oxygen for SpO2 90 - 95%    PEEP 5    Tidal Volume mL            09/17/24 0354    09/17/24 0225  Ventilator - Vent Mode: AC/VC; Rate: 20; FiO2: Titrate  Per SpO2; Titrate Oxygen for SpO2: 90 - 95%; PEEP: 5; Tidal Volume: mL; TV: 500  Continuous,   Status:  Canceled        Question Answer Comment   Vent Mode AC/VC    Rate 20    FiO2 Titrate Per SpO2    Titrate Oxygen for SpO2 90 - 95%    PEEP 5    Tidal Volume mL            09/17/24 0224                          Physical Exam:     Vital Signs  Temp:  [97.7 °F (36.5 °C)-98.8 °F (37.1 °C)] 98.8 °F (37.1 °C)  Heart Rate:  [] 98  Resp:  [22-31] 31  BP: ()/(37-73) 118/52  FiO2 (%):  [25 %-26 %] 26 %    Intake/Output Summary (Last 24 hours) at 9/20/2024 0811  Last data filed at 9/20/2024 0653  Gross per 24 hour   Intake 6246 ml   Output 1250 ml   Net 4996 ml     Flowsheet Rows      Flowsheet Row First Filed Value   Admission Height --   Admission Weight 68 kg (149 lb 14.6 oz) Documented at 09/16/2024 2100            PPE used per hospital policy    General Appearance:  On the ventilator.  Alert.  Cachectic.   ENMT: ET tube noted.  Extremities normal.   Eyes:  Pupils equal and reactive to light. EOMI   Neck:   Trachea midline. No thyromegaly.   Lungs:   Coarse anteriorly.  No wheezing.  Mild crackles    Heart:   Regular rhythm and normal rate, normal S1 and S2, no         murmur   Skin:   No rash or ecchymosis   Abdomen:   PEG tube noted.  Soft. No tenderness. No HSM.   Neuro/psych: Somnolent after receiving a dose of fentanyl.  Arouses to stimulus.  Moves extremities symmetrically but weak all over.   Extremities:  No cyanosis, clubbing or edema.  Warm extremities and well-perfused.  Loss of muscle mass in arms and legs.          Results Review:        Results from last 7 days   Lab Units 09/19/24  2212 09/19/24  0446 09/18/24  0708 09/17/24  0516   SODIUM mmol/L  --  145 140 132*   POTASSIUM mmol/L 2.2* 2.3* 3.1* 5.3*   CHLORIDE mmol/L  --  114* 109* 104   CO2 mmol/L  --  24.3 21.9* 17.0*   BUN mg/dL  --  10 17 23   CREATININE mg/dL  --  0.41* 0.51* 0.84   GLUCOSE mg/dL  --  92 63* 84   CALCIUM mg/dL  --   7.3* 7.7* 8.3*     Results from last 7 days   Lab Units 09/17/24  0516 09/16/24  2357   HSTROP T ng/L 50* 85*     Results from last 7 days   Lab Units 09/19/24  0446 09/18/24  0709 09/17/24  1127   WBC 10*3/mm3 15.38* 13.50* 14.72*   HEMOGLOBIN g/dL 8.2* 8.3* 9.3*   HEMATOCRIT % 27.3* 26.9* 29.3*   PLATELETS 10*3/mm3 252 248 267                           Results from last 7 days   Lab Units 09/19/24  0341 09/18/24  0357 09/17/24  0641 09/17/24  0339 09/17/24  0009   PH, ARTERIAL pH units 7.503* 7.420 7.428 7.243* 7.295*   PCO2, ARTERIAL mm Hg 28.8* 36.0 37.8 64.2* 45.1*   PO2 ART mm Hg 67.0* 109.1* 85.5 228.6* 68.0*   O2 SATURATION ART % 95.0 98.4 96.8 99.7* 91.0*   FLOW RATE lpm  --   --   --   --  15.0000   MODALITY  Adult Vent Adult Vent Adult Vent Adult Vent NRB         I reviewed the patient's new clinical results.        Medication Review:   atorvastatin, 40 mg, Per PEG Tube, Daily  chlorhexidine, 15 mL, Mouth/Throat, Q12H  enoxaparin, 40 mg, Subcutaneous, Q24H  fentaNYL citrate (PF), 100 mcg, Intravenous, Once  guaifenesin, 200 mg, Per PEG Tube, Q6H  ipratropium-albuterol, 3 mL, Nebulization, 4x Daily - RT  levothyroxine, 25 mcg, Per PEG Tube, Q AM  pantoprazole, 40 mg, Intravenous, Q24H  senna-docusate sodium, 2 tablet, Per PEG Tube, BID  sodium hypochlorite, , Topical, Q12H  vancomycin, 1,000 mg, Intravenous, Q12H        dexmedetomidine, 0.2-1.5 mcg/kg/hr, Last Rate: 0.3 mcg/kg/hr (09/20/24 0720)  norepinephrine, 0.02-0.3 mcg/kg/min, Last Rate: 0.12 mcg/kg/min (09/20/24 0721)  Pharmacy to dose vancomycin,   propofol, 5-50 mcg/kg/min, Last Rate: Stopped (09/19/24 1149)  sodium chloride, 125 mL/hr, Last Rate: 125 mL/hr (09/19/24 2300)  vasopressin, 0.03 Units/min, Last Rate: Stopped (09/18/24 1200)        Diagnostic imaging:  I personally and independently reviewed the following images:  CXR 9/17/2024: Consolidation in the left lung.  Patchy infiltrates on the right.    CXR 9/18/2024: Bilateral pulmonary  infiltrates/pneumonia mostly on the left.    Assessment     Septic shock, POA  Bilateral pneumonia, MRSA and E. coli sensitive to Rocephin  Acute hypoxemic and hypercapnic respiratory failure, requiring mechanical ventilation 9/16  Metabolic encephalopathy  Lactic acidosis, resolved  Elevated troponin  Severe protein calorie malnutrition/hypoalbuminemia (albumin 2)  Slightly elevated troponin, probably non-STEMI type II  Positive MRSA screen  Hypokalemia    Chronic anemia  Ankylosing spondylitis of cervical spine, on methotrexate  BPH  Chronic indwelling Mariscal catheter  HTN  Dysphagia with PEG tube  Failure to thrive  Hx osteomyelitis left calcaneus (culture 8/20 positive for Proteus mirabilis, Pseudomonas and E. Coli)  Urine culture positive for ESBL 8/17/2024  Pressure ulcers: Left calcaneal, sacrum, ischial.  POA.  Debrided 8/20/2024  History of ESBL bacteremia 6/18/2024  Hypothyroidism      Plan     Mechanical ventilation management: Settings reviewed and adjusted.  Placed on spontaneous mode with a pressure support .  Will conduct a weaning trial for 1 hour and check follow-up ABG and parameters and determine whether he could be extubated.  Continue vancomycin for pneumonia.  Start Rocephin to cover E. coli with total duration of treatment for E. coli for 7 days (received Merrem for 2 days prior)  Ventilator bundle.  ICU core measures: DVT prophylaxis with Lovenox.  PPI prophylaxis if remains on the ventilator  Mucinex  fentanyl 50 mcg every hour as needed.  Will discontinue if he gets extubated.  Continue tube feeding  Replace potassium  Levophed IV drip.  Titrate to keep MAP >65.        Margaret Carrizales MD  09/20/24  08:11 EDT        Time: Critical care 35 min      This note was dictated utilizing Dragon dictation

## 2024-09-20 NOTE — PLAN OF CARE
Goal Outcome Evaluation:   Remain in CICU on the vent with minimal sedation. Levo infusing weaning as able no acute event overnight vitals stable will continued to monitor.

## 2024-09-20 NOTE — NURSING NOTE
"   09/20/24 1552   Wound 08/20/24 1259 Left gluteal Incision   Placement Date/Time: 08/20/24 1259   Side: Left  Location: gluteal  Primary Wound Type: Incision   Wound Image    Pressure Injury Stage U   Dressing Appearance moist drainage   Closure None   Base non-granulating;moist;necrotic;red;slough;yellow   Periwound redness   Periwound Temperature warm   Periwound Skin Turgor soft   Edges open   Wound Length (cm) 5 cm   Wound Width (cm) 6 cm   Wound Surface Area (cm^2) 30 cm^2   Drainage Characteristics/Odor serosanguineous   Drainage Amount small   Care, Wound cleansed with;sterile water   Dressing Care dressing changed;silicone  (Dakin's 1/4 gauze wet to moist placed)   Wound 08/20/24 1259 coccyx Incision   Placement Date/Time: 08/20/24 1259   Location: coccyx  Primary Wound Type: Incision   Wound Image    Pressure Injury Stage 4   Dressing Appearance moist drainage   Closure None   Base exposed structure;moist;necrotic;red;slough;yellow   Periwound excoriated;ecchymotic;indurated;moist;non-blanchable;redness;swelling   Periwound Temperature warm   Periwound Skin Turgor soft   Edges open   Wound Length (cm) 13 cm   Wound Width (cm) 7 cm   Wound Depth (cm) 2.5 cm   Wound Surface Area (cm^2) 91 cm^2   Wound Volume (cm^3) 227.5 cm^3   Undermining [Depth (cm)/Location] 12 to 5 3cm at 3   Drainage Characteristics/Odor serosanguineous   Drainage Amount small   Care, Wound cleansed with;sterile water   Dressing Care dressing changed;gauze, wet-to-moist;silicone  (Dakin\"s moist gauze wet to moist placed)   Wound 08/20/24 1900 Left scapula   Placement Date/Time: 08/20/24 1900   Present on Original Admission: Yes  Side: Left  Location: scapula   Wound Image    Pressure Injury Stage U   Dressing Appearance moist drainage   Closure None   Base non-blanchable;non-granulating;moist;necrotic;slough;yellow   Periwound redness   Periwound Temperature warm   Periwound Skin Turgor soft   Edges open   Wound Length (cm) 3 cm " "  Wound Width (cm) 5 cm   Wound Surface Area (cm^2) 15 cm^2   Drainage Characteristics/Odor serosanguineous   Drainage Amount scant   Care, Wound cleansed with;sterile water   Dressing Care dressing changed;silicone  (Dakin\"s 1/4 gauze wet to moist placed)   Wound 06/18/24 1345 Right scapula Pressure Injury   Placement Date/Time: 06/18/24 1345   Present on Original Admission: Yes  Side: Right  Location: scapula  Primary Wound Type: Pressure Injury   Wound Image    Pressure Injury Stage U   Dressing Appearance moist drainage   Base non-blanchable;non-granulating;moist;necrotic;red   Periwound redness   Periwound Temperature warm   Periwound Skin Turgor soft   Edges open   Wound Length (cm) 3 cm   Wound Width (cm) 3 cm   Wound Surface Area (cm^2) 9 cm^2   Care, Wound cleansed with;sterile water   Dressing Care dressing changed;silicone  (Dakin's 1/4 gauze wet to moist placed)   Wound 08/17/24 Right distal leg Pressure Injury   Placement Date: 08/17/24   Side: Right  Orientation: distal  Location: leg  Primary Wound Type: Pressure Injury   Wound Image    Pressure Injury Stage DTPI   Base non-blanchable;purple   Periwound redness   Periwound Temperature warm   Periwound Skin Turgor soft   Wound Length (cm) 2 cm   Wound Width (cm) 2 cm   Wound Surface Area (cm^2) 4 cm^2   Dressing Care silicone   Wound 06/18/24 1345 Right lateral foot Pressure Injury   Placement Date/Time: 06/18/24 1345   Present on Original Admission: Yes  Side: Right  Orientation: lateral  Location: foot  Primary Wound Type: Pressure Injury   Wound Image    Pressure Injury Stage U   Base non-blanchable   Periwound redness   Periwound Temperature warm   Periwound Skin Turgor soft   Edges open   Wound Length (cm) 1.5 cm   Wound Width (cm) 1.5 cm   Wound Surface Area (cm^2) 2.25 cm^2   Care, Wound cleansed with;sterile water   Dressing Care dressing changed;silicone   Wound 08/17/24 Right posterior greater trochanter Pressure Injury   Placement Date: " 08/17/24   Side: Right  Orientation: posterior  Location: greater trochanter  Primary Wound Type: Pressure Injury   Wound Image    Pressure Injury Stage DTPI   Base non-blanchable;purple   Periwound redness   Periwound Temperature warm   Periwound Skin Turgor soft   Wound Length (cm) 2 cm   Wound Width (cm) 2 cm   Wound Surface Area (cm^2) 4 cm^2   Dressing Care silicone   Wound 09/20/24 Right ischial tuberosity Pressure Injury   Placement Date: 09/20/24   Side: Right  Location: ischial tuberosity  Primary Wound Type: Pressure Injury   Pressure Injury Stage DTPI   Base non-blanchable;purple   Periwound redness   Periwound Temperature warm   Periwound Skin Turgor soft   Wound Length (cm) 2 cm   Wound Width (cm) 2 cm   Wound Surface Area (cm^2) 4 cm^2   Dressing Care silicone   Wound 09/20/24 1552 Left heel Pressure Injury   Placement Date/Time: 09/20/24 1552   Present on Original Admission: Yes  Side: Left  Location: heel  Primary Wound Type: Pressure Injury   Pressure Injury Stage U   Dressing Appearance moist drainage   Closure None   Base moist;necrotic;red;slough;yellow   Periwound excoriated;moist;redness;swelling   Periwound Temperature warm   Periwound Skin Turgor soft   Edges open   Wound Length (cm) 6 cm   Wound Width (cm) 7 cm   Wound Surface Area (cm^2) 42 cm^2   Drainage Characteristics/Odor serosanguineous   Drainage Amount scant   Care, Wound cleansed with;sterile water   Dressing Care dressing changed;silicone   Skin Interventions   Pressure Reduction Devices specialty bed utilized   Pressure Reduction Techniques heels elevated off bed;positioned off wounds     CWCN: We see patient for follow-up of injuries present on admission. Patient alert, was extubated today. Known to us from previous admission. Upon assessment we could see multiple full-thickness skin and tissue loss as well as partial and DTI present on admission or deterioration of existing ones.  Coccyx injury is the largest, with indurated  audra-wound, purple discoloration extending to the sacrum area and part of the buttock.  Dakin's solution moist to wet gauze was placed covering with Optifoam dressing, as well as protective padding on DTI area.  Wound care order and pressure ulcer preventives  measures have been implemented into Epic.   He is already on Progressa bed  for adequate pressure redistribution and pressure relief.  He is at risk for further skin problems, is completely bedridden, is contracted, impaired mobility and is incontinent, repositioning him every two hours and minimizing any skin contact with urine or stool would be beneficial.  Please re-consult for any additional needs.

## 2024-09-20 NOTE — PLAN OF CARE
Goal Outcome Evaluation: Pt remains in CCU in critical care status on mechanical ventilation, Dex and low dose Levo. Plan for SBT in morning.

## 2024-09-20 NOTE — PROGRESS NOTES
Nutrition Services    Patient Name:  Anthony Gallegos  YOB: 1945  MRN: [id number]  Admit Date:  9/16/2024  Assessment Date:  09/20/24    Summary: Nutrition follow up  Pt has been receiving Nutren 2.0 at 50ml/hr and water 30ml/hr plus Erich BID  Pt tolerating TF's at goal  Labs: Na 147, k 3.0  Skin: coccyx stage 4, left gluteal incision, left scapula stage 3, right scapula PI unstageable, posterior penis  Meds: IVF's decreased to 50ml/hr, levo, precedex, lipirot, peridex, lovenox, protonix pericolace vanc  Pt just extubated    Plan/Recommendations  Continue TF's with goal at 50ml/hr  Erich BID to help with wound healing  MD to adjust water flushes as needed  Will monitor labs/wt/skin  Speech eval when appropriate  RD to follow      CLINICAL NUTRITION ASSESSMENT      Reason for Assessment Follow-up Protocol     Diagnosis/Problem    altered mental status,Intubated, hypotensive, sepsis, pneumonia     Medical/Surgical History Past Medical History:   Diagnosis Date    Abscess of scrotum     MARTITA (acute kidney injury)     Altered mental state     Arthritis     AS (ankylosing spondylitis)     History of MRSA infection     Hypertension     Leukocytosis     Tetrahydrocannabinol (THC) use disorder, mild, abuse 12/20/2023    Uveitis        Past Surgical History:   Procedure Laterality Date    COLONOSCOPY      ENDOSCOPY W/ PEG TUBE PLACEMENT N/A 03/29/2024    Procedure: ESOPHAGOGASTRODUODENOSCOPY WITH PERCUTANEOUS ENDOSCOPIC GASTROSTOMY TUBE INSERTION;  Surgeon: Khadar Broderick MD;  Location: [facility] ENDOSCOPY;  Service: General;  Laterality: N/A;  PRE/POST - DYSPHAGIA    ROTATOR CUFF REPAIR Right     TOTAL HIP ARTHROPLASTY Left 2014    UPPER GASTROINTESTINAL ENDOSCOPY      WOUND DEBRIDEMENT N/A 08/20/2024    Procedure: DEBRIDEMENT SACRAL ULCER/WOUND;  Surgeon: Justine Roque MD;  Location: [facility] MAIN OR;  Service: General;  Laterality: N/A;    WOUND DEBRIDEMENT Left 08/20/2024    Procedure: LEFT  DEBRIDEMENT FOOT;  Surgeon: Renny Duval DPM;  Location: [facility] OR;  Service: Podiatry;  Laterality: Left;        Anthropometrics        Current Height  Current Weight  BMI kg/m2    Weight: 74.8 kg (164 lb 14.5 oz) (09/20/24 0600)  Body mass index is 20.61 kg/m².   Adjusted BMI (if applicable)    BMI Category Normal/Healthy (18.4 - 24.9)   Ideal Body Weight (IBW) 186lb   Usual Body Weight (UBW) 140-160lb   Weight Trend Loss, Gain   Weight History Wt Readings from Last 30 Encounters:   09/20/24 0600 74.8 kg (164 lb 14.5 oz)   09/19/24 0558 72.8 kg (160 lb 7.9 oz)   09/18/24 0449 73.8 kg (162 lb 11.2 oz)   09/16/24 2100 68 kg (149 lb 14.6 oz)   09/11/24 0939 67.1 kg (148 lb)   08/30/24 1027 67.1 kg (148 lb)   08/28/24 0209 72.5 kg (159 lb 13.3 oz)   08/27/24 0500 72.2 kg (159 lb 2.8 oz)   08/26/24 0446 71.1 kg (156 lb 12 oz)   08/25/24 0313 74.5 kg (164 lb 3.9 oz)   08/24/24 1506 71.7 kg (158 lb)   08/24/24 0232 71.8 kg (158 lb 4.6 oz)   08/23/24 0604 71.9 kg (158 lb 8.2 oz)   08/22/24 0624 69.9 kg (154 lb 1.6 oz)   08/21/24 0518 73.8 kg (162 lb 11.2 oz)   08/20/24 0503 72.4 kg (159 lb 9.8 oz)   08/19/24 0618 70.4 kg (155 lb 3.3 oz)   08/17/24 0730 63.6 kg (140 lb 3.4 oz)   08/08/24 1044 63.6 kg (140 lb 4.8 oz)   06/27/24 0300 71 kg (156 lb 8.4 oz)   06/26/24 0500 71 kg (156 lb 8.4 oz)   06/25/24 0545 71 kg (156 lb 8.4 oz)   06/23/24 0600 60.4 kg (133 lb 2.5 oz)   06/22/24 0556 61.8 kg (136 lb 3.9 oz)   06/21/24 0500 62 kg (136 lb 11 oz)   06/20/24 0419 65.4 kg (144 lb 2.9 oz)   06/17/24 2351 63.1 kg (139 lb 1.6 oz)   04/09/24 0516 90.4 kg (199 lb 4.7 oz)   04/08/24 0420 90.5 kg (199 lb 8.3 oz)   04/07/24 0600 81 kg (178 lb 9.2 oz)   04/06/24 0500 80 kg (176 lb 5.9 oz)   04/03/24 0521 81.4 kg (179 lb 7.3 oz)   04/02/24 0435 80.4 kg (177 lb 4 oz)   04/01/24 0420 80.9 kg (178 lb 5.6 oz)   03/31/24 0530 82.7 kg (182 lb 6.4 oz)   03/30/24 0557 82.2 kg (181 lb 3.5 oz)   03/29/24 0548 82.6 kg (182 lb 1.6 oz)    03/28/24 0500 76.9 kg (169 lb 8 oz)   03/27/24 0617 76.4 kg (168 lb 6.4 oz)   03/25/24 2123 69.1 kg (152 lb 6.4 oz)   03/25/24 1516 79 kg (174 lb 2.6 oz)   01/31/24 0639 79 kg (174 lb 3.2 oz)   01/29/24 0509 78.8 kg (173 lb 11.6 oz)   01/28/24 0530 77.7 kg (171 lb 4.8 oz)   01/25/24 0537 76.4 kg (168 lb 6.9 oz)   01/23/24 0542 73.2 kg (161 lb 6 oz)   01/22/24 0502 75.1 kg (165 lb 9.1 oz)   01/21/24 2304 75.5 kg (166 lb 7.2 oz)   01/21/24 1744 81.6 kg (180 lb)   01/08/24 0253 81.6 kg (179 lb 14.3 oz)   01/05/24 0440 87.9 kg (193 lb 12.6 oz)   01/04/24 0439 85.6 kg (188 lb 11.4 oz)   01/03/24 0513 82.5 kg (181 lb 14.1 oz)   12/31/23 1300 80.5 kg (177 lb 7.5 oz)   12/31/23 0350 83.8 kg (184 lb 11.9 oz)   12/30/23 0511 85.3 kg (188 lb 0.8 oz)   12/29/23 0341 85.7 kg (188 lb 15 oz)   12/28/23 0435 85.4 kg (188 lb 4.4 oz)   12/27/23 0755 81 kg (178 lb 9.2 oz)   12/27/23 0423 85.4 kg (188 lb 4.4 oz)   12/25/23 0707 85.2 kg (187 lb 13.3 oz)   12/23/23 0608 81.3 kg (179 lb 3.7 oz)   12/22/23 0430 81.8 kg (180 lb 5.4 oz)   12/20/23 1005 81.9 kg (180 lb 8 oz)   03/27/18 1439 100 kg (221 lb)   03/22/18 1613 101 kg (222 lb 12.8 oz)   01/23/18 1414 101 kg (223 lb)   12/26/17 1111 99.8 kg (220 lb)   10/19/17 1239 95.3 kg (210 lb)   08/30/17 1459 91.3 kg (201 lb 3.2 oz)   08/29/17 1221 93 kg (205 lb)   08/10/17 1517 88 kg (194 lb)   07/14/17 1041 85.9 kg (189 lb 6.4 oz)   06/29/17 1318 84.5 kg (186 lb 3.2 oz)   06/21/17 1922 90.7 kg (200 lb)      --  Labs       Pertinent Labs    Results from last 7 days   Lab Units 09/20/24  1122 09/20/24  0728 09/19/24  2212 09/19/24  0446 09/18/24  0708 09/17/24  0516 09/16/24  2357   SODIUM mmol/L  --  147*  --  145 140   < > 137   POTASSIUM mmol/L 3.0* 2.7* 2.2* 2.3* 3.1*   < > 4.8   CHLORIDE mmol/L  --  117*  --  114* 109*   < > 101   CO2 mmol/L  --  24.1  --  24.3 21.9*   < > 23.0   BUN mg/dL  --  11  --  10 17   < > 24*   CREATININE mg/dL  --  0.38*  --  0.41* 0.51*   < > 1.06   CALCIUM  mg/dL  --  7.7*  --  7.3* 7.7*   < > 9.0   BILIRUBIN mg/dL  --   --   --   --   --   --  0.4   ALK PHOS U/L  --   --   --   --   --   --  70   ALT (SGPT) U/L  --   --   --   --   --   --  24   AST (SGOT) U/L  --   --   --   --   --   --  27   GLUCOSE mg/dL  --  126*  --  92 63*   < > 87    < > = values in this interval not displayed.     Results from last 7 days   Lab Units 09/20/24  0728 09/19/24  0446 09/17/24  1127 09/16/24  2357   MAGNESIUM mg/dL  --  1.6  --   --    HEMOGLOBIN g/dL 8.5* 8.2*   < > 9.7*   HEMATOCRIT % 26.0* 27.3*   < > 30.4*   WBC 10*3/mm3 12.56* 15.38*   < > 6.51   ALBUMIN g/dL  --   --   --  2.3*    < > = values in this interval not displayed.     Results from last 7 days   Lab Units 09/20/24  0728 09/19/24  0446 09/18/24  0709 09/17/24  1127 09/16/24  2357   PLATELETS 10*3/mm3 241 252 248 267 345     COVID19   Date Value Ref Range Status   09/17/2024 Not Detected Not Detected - Ref. Range Final     Lab Results   Component Value Date    HGBA1C 5.80 (H) 01/23/2024          Medications           Scheduled Medications atorvastatin, 40 mg, Per PEG Tube, Daily  cefTRIAXone, 2,000 mg, Intravenous, Q24H  chlorhexidine, 15 mL, Mouth/Throat, Q12H  enoxaparin, 40 mg, Subcutaneous, Q24H  fentaNYL citrate (PF), 100 mcg, Intravenous, Once  guaifenesin, 200 mg, Per PEG Tube, Q6H  ipratropium-albuterol, 3 mL, Nebulization, 4x Daily - RT  levothyroxine, 25 mcg, Per PEG Tube, Q AM  pantoprazole, 40 mg, Intravenous, Q24H  senna-docusate sodium, 2 tablet, Per PEG Tube, BID  sodium hypochlorite, , Topical, Q12H  vancomycin, 1,000 mg, Intravenous, Q12H       Infusions dexmedetomidine, 0.2-1.5 mcg/kg/hr, Last Rate: 0.5 mcg/kg/hr (09/20/24 1027)  norepinephrine, 0.02-0.3 mcg/kg/min, Last Rate: 0.12 mcg/kg/min (09/20/24 0721)  Pharmacy to dose vancomycin,   propofol, 5-50 mcg/kg/min, Last Rate: Stopped (09/19/24 1149)  sodium chloride, 50 mL/hr, Last Rate: 50 mL/hr (09/20/24 0924)  vasopressin, 0.03 Units/min, Last  Rate: Stopped (09/18/24 1200)       PRN Medications   acetaminophen **OR** acetaminophen    senna-docusate sodium **AND** polyethylene glycol **AND** bisacodyl **AND** bisacodyl    Calcium Replacement - Follow Nurse / BPA Driven Protocol    dextrose    dextrose    fentaNYL citrate (PF)    glucagon (human recombinant)    Magnesium Standard Dose Replacement - Follow Nurse / BPA Driven Protocol    nitroglycerin    Pharmacy to dose vancomycin    Phosphorus Replacement - Follow Nurse / BPA Driven Protocol    Potassium Replacement - Follow Nurse / BPA Driven Protocol     Physical Findings          General Findings on oxygen therapy, responds/arouses to voice just extubated   Oral/Mouth Cavity tooth or teeth missing   Edema  1+ (trace)   Gastrointestinal last bowel movement: 9/20   Skin  coccyx stage 4, left gluteal incision, left scapula stage 3, right scapula PI unstageable, posterior penis   Tubes/Drains/Lines PEG   NFPE See Malnutrition Severity Assessment, Date Completed: 9/18   --  Malnutrition Severity Assessment      Patient meets criteria for : Severe Malnutrition           Estimated/Assessed Needs        Current Weight  Weight: 74.8 kg (164 lb 14.5 oz) (09/20/24 0600)       Energy Requirements    Weight for Calculation 73.8 kg   Method for Estimation  30-35 kcal/kg   EST Needs (kcal/day) 4331-5104       Protein Requirements    Weight for Calculation 73.8 kg   EST Protein Needs (g/kg) 1.5 gm/kg   EST Daily Needs (g/day) 110       Fluid Requirements     Method for Estimation 1 mL/kcal    EST Needs (mL/day)      Current Nutrition Orders & Evaluation of Intake       Oral Nutrition     Food Allergies NKFA   Current PO Diet NPO Diet NPO Type: Tube Feeding   Supplement n/a   PO Evaluation     % PO Intake npo    Factors Affecting Intake: altered respiratory status   --   Enteral Nutrition     Enteral Route PEG    TF Delivery Method Continuous    Propofol Rate/Kcal     Current TF Order/Rate  Nutren 2.0 @ 50 mL/hr    TF  Goal Rate 50 mL/hr    Current Water Flush 30 mL Q 4 hr    Modular Erich    TF Residual  no or minimal residual    TF Tolerance tolerating    TF Observation Verified correct TF and water flush infusing per orders     PES STATEMENT / NUTRITION DIAGNOSIS      Nutrition Dx Problem  Problem: Malnutrition (severe) and Increased Nutrient Needs  Etiology: Medical Diagnosis - respiratory failure on vent    Signs/Symptoms: NPO and Report/Observation     NUTRITION INTERVENTION / PLAN OF CARE      Intervention Goal(s) Improved nutrition related labs, Reduce/improve symptoms, Meet estimated needs, Disease management/therapy, Initiate TF/PN, Tolerate TF/PN at goal, and Maintain weight         RD Intervention/Action Await initiation of EN/PN, Continue to monitor, Care plan reviewed, and Recommend/order: TF's   --      Prescription/Orders:       PO Diet Speech eval when appropriate      Supplements       Enteral Nutrition       Parenteral Nutrition    New Prescription Ordered?    --   Enteral Prescription:     Enteral Route PEG    TF Delivery Method Continuous    Enteral Product Nutren 2.0    Modular Erich BID    Propofol Rate/Kcal     TF Start Rate  20 mL/hr    TF Goal Rate  50 mL/hr    Free Water Flush 30 mL Q 4 hr    Provision at Goal: The above end goal rate is for 22 hrs/day to assume interruptions for ADLs. Water flushes adjusted based on clinical picture + Rx flushes/IV fluids         Calories 2200 kcal plus erich, meets 100% needs         Protein  92 gm protein, meets 96% needs         Fluid (mL) 759 mL free water + 180 mL in flushes   Erich BID to provide 90 calories, 7 grams L-Arginine, 7 grams L-Glutamine and 2.5 grams Protein (Collagen)      Monitor/Evaluation Per protocol, I&O, Pertinent labs, EN delivery/tolerance, Weight, Skin status, GI status, Symptoms, POC/GOC, Hemodynamic stability   Discharge Plan/Needs Pending clinical course   --    RD to follow per protocol.      Electronically signed by:  Tamar Ott,  RD  09/20/24 12:40 EDT

## 2024-09-21 NOTE — PLAN OF CARE
Pt remains in CCU in critical care status. VSS on 2L O2 via NC. Patient off of Levo since 5:30am. Mild pain managed with Tylenol. Pt able to cough out some secretions with weak cough;oral care done. Pt able to maintain patent airway. Wound care/dressings completed, patient tolerated procedure well with some discomfort. Plan of care ongoing.

## 2024-09-21 NOTE — PLAN OF CARE
Goal Outcome Evaluation: Pt remains in CCU in critical care status. Pt extubated to 2 L nasal cannula at 1107 this shift. Pt remains on levophed to maintain MAP goals.

## 2024-09-21 NOTE — THERAPY EVALUATION
Acute Care - Speech Language Pathology   Swallow Initial Evaluation [facility]     Patient Name: Anthony Gallegos  : 1945  MRN: [id number]  Today's Date: 2024               Admit Date: 2024    Visit Dx:     ICD-10-CM ICD-9-CM   1. Acute respiratory failure with hypoxia  J96.01 518.81   2. Septic shock  A41.9 038.9    R65.21 785.52     995.92   3. Pneumonia due to infectious organism, unspecified laterality, unspecified part of lung  J18.9 486   4. Pressure injury of skin of sacral region, unspecified injury stage  L89.159 707.03     707.20   5. Chronic heel ulcer, left, with unspecified severity  L97.429 707.14   6. Ankylosing spondylitis, unspecified site of spine  M45.9 720.0   7. Immobility  Z74.09 799.89   8. Indwelling Mariscal catheter present  Z97.8 V45.89   9. Malnutrition, unspecified type  E46 263.9   10. Dysphagia, unspecified type  R13.10 787.20   11. Feeding by G-tube  Z93.1 V44.1   12. Acute encephalopathy  G93.40 348.30     Patient Active Problem List   Diagnosis    Ankylosing spondylitis    Recent unexplained weight loss    Abnormal serum lipase level    Abnormal serum level of amylase    Essential hypertension    Boil    Immobility    Fall from bed    Tetrahydrocannabinol (THC) use disorder, mild, abuse    Generalized pain        Grief    DISH (disseminated idiopathic skeletal hyperostosis)    Generalized weakness    Severe malnutrition    Altered mental status    Transient alteration of awareness    GERD without esophagitis    Enlarged prostate    UTI (urinary tract infection)    Hyperglycemia    Decreased oral intake    Dysphagia    Failure to thrive in adult    Immunosuppression due to drug therapy    Renal failure    UTI (urinary tract infection), bacterial    Anemia    Acute on chronic anemia    Elevated liver function tests    Indwelling Mariscal catheter present    Thyroid function test abnormal    Dysphagia    Malnutrition    Feeding by G-tube    Hyperlipidemia     Hypothyroidism (acquired)    Sacral wound    Sepsis    Septic shock     Past Medical History:   Diagnosis Date    Abscess of scrotum     MARTITA (acute kidney injury)     Altered mental state     Arthritis     AS (ankylosing spondylitis)     History of MRSA infection     Hypertension     Leukocytosis     Tetrahydrocannabinol (THC) use disorder, mild, abuse 12/20/2023    Uveitis      Past Surgical History:   Procedure Laterality Date    COLONOSCOPY      ENDOSCOPY W/ PEG TUBE PLACEMENT N/A 03/29/2024    Procedure: ESOPHAGOGASTRODUODENOSCOPY WITH PERCUTANEOUS ENDOSCOPIC GASTROSTOMY TUBE INSERTION;  Surgeon: Khadar Broderick MD;  Location: [facility] ENDOSCOPY;  Service: General;  Laterality: N/A;  PRE/POST - DYSPHAGIA    ROTATOR CUFF REPAIR Right     TOTAL HIP ARTHROPLASTY Left 2014    UPPER GASTROINTESTINAL ENDOSCOPY      WOUND DEBRIDEMENT N/A 08/20/2024    Procedure: DEBRIDEMENT SACRAL ULCER/WOUND;  Surgeon: Justine Roque MD;  Location: [facility] MAIN OR;  Service: General;  Laterality: N/A;    WOUND DEBRIDEMENT Left 08/20/2024    Procedure: LEFT DEBRIDEMENT FOOT;  Surgeon: Renny Duval DPM;  Location: [facility] OR;  Service: Podiatry;  Laterality: Left;       SLP Recommendation and Plan  SLP Swallowing Diagnosis: oral dysphagia, pharyngeal dysphagia (09/21/24 1100)  SLP Diet Recommendation: NPO, other (see comments) (ice chips with oral care BID prn) (09/21/24 1100)  Recommended Precautions and Strategies: upright posture during/after eating, assist with feeding (09/21/24 1100)  SLP Rec. for Method of Medication Administration: meds via alternate route (09/21/24 1100)     Monitor for Signs of Aspiration: yes, notify SLP if any concerns (09/21/24 1100)  Recommended Diagnostics: VFSS (MBS) (09/21/24 1100)  Swallow Criteria for Skilled Therapeutic Interventions Met: demonstrates skilled criteria (09/21/24 1100)  Anticipated Discharge Disposition (SLP): skilled nursing facility (09/21/24 1100)  Rehab  "Potential/Prognosis, Swallowing: good, to achieve stated therapy goals (09/21/24 1100)  Therapy Frequency (Swallow): PRN (09/21/24 1100)  Predicted Duration Therapy Intervention (Days): until discharge (09/21/24 1100)  Oral Care Recommendations: Oral Care BID/PRN (09/21/24 1100)                                        Outcome Evaluation: Clinical bedside swallow evaluation completed. Pt oriented to self only and is a poor historian. Pt with productive cough. Swallow delayed with tongue pumping when subjectively palpated. Provided ice chip trials with good manipulation re bolus formation with trigger swallow. No overt clinical s/s of aspiration observed at bedside. Pt expressed desire to return to food or drink \"even just a little bit.\" With pt's chronic history of dysphagia and high risk of aspiration, recommend VFSS to be completed to assess safety and risk of PO foods and drinks. Attempted VFSS this date however nsg feels pt is not yet stable. Recommend new orders for VFSS when pt is stable and prior to d/c. Recommmend continue NPO status with ice chips following oral care. Meds and nutrition via alternate route.      SWALLOW EVALUATION (Last 72 Hours)       SLP Adult Swallow Evaluation       Row Name 09/21/24 1100       Rehab Evaluation    Subjective Information no complaints  -HT    Patient Observations agree to therapy  -HT    Patient Effort fair  -HT       General Information    Patient Profile Reviewed yes  -HT    Pertinent History Of Current Problem 78 yo male admitted from nursing home for acute respiratory failure. Pt intubated 3 days and weaned successfully. Pt with PEG tube placed in August. Pt seen by E SLPs with recommendation for puree and HTL and rely on PEG for nutrition if negative respiratory functions observed. Unclear of diet at SNF. Pt reports he was not seeing speech therapy and that he would like to be able to eat and drink again.  -HT    Current Method of Nutrition gastrostomy feedings  -HT "    Precautions/Limitations, Vision for purposes of eval  -HT    Precautions/Limitations, Hearing for purposes of eval  -HT    Prior Level of Function-Communication cognitive-linguistic impairment  -HT    Prior Level of Function-Swallowing concerns regarding malnutrition;concerns regarding dehydration;alternative feeding method;puree;honey thick liquids  -HT    Plans/Goals Discussed with patient  -HT    Barriers to Rehab cognitive status;medically complex;previous functional deficit  -HT    Patient's Goals for Discharge return to regular diet  -HT       Pain    Additional Documentation Pain Scale: Numbers Pre/Post-Treatment (Group)  -HT       Pain Scale: Numbers Pre/Post-Treatment    Pretreatment Pain Rating 0/10 - no pain  -HT    Posttreatment Pain Rating 0/10 - no pain  -HT       Oral Motor Structure and Function    Dentition Assessment edentulous  -HT    Secretion Management wet vocal quality  -HT    Mucosal Quality dry  -HT    Volitional Swallow delayed  -HT    Volitional Cough other (see comments);reduced respiratory support  productive  -HT       Oral Musculature and Cranial Nerve Assessment    Oral Motor General Assessment WFL  -HT       General Eating/Swallowing Observations    Respiratory Support Currently in Use nasal cannula  -HT    O2 Liters 2L  -HT    Eating/Swallowing Skills fed by SLP;unable to perform self-feeding  -HT    Positioning During Eating upright in bed  -HT    Utensils Used spoon  -HT    Consistencies Trialed ice chips  -HT    Pre SpO2 (%) 92  -HT    Post SpO2 (%) 92  -HT       Respiratory    Respiratory Status WFL  -HT       Clinical Swallow Eval    Clinical Swallow Evaluation Summary Clinical bedside swallow evaluation completed. Pt oriented to self only and is a poor historian. Pt with productive cough. Swallow delayed with tongue pumping when subjectively palpated. Provided ice chip trials with good manipulation re bolus formation with trigger swallow. No overt clinical s/s of aspiration  "observed at bedside. Pt expressed desire to return to food or drink  \"even just a little bit.\" With pt's chronic history of dysphagia and high risk of aspiration, recommend VFSS to be completed to assess safety and risk of PO foods and drinks. Attempted VFSS this date however nsg feels pt is not yet stable. Recommend new orders for VFSS when pt is stable and prior to d/c. Recommmend continue NPO status with ice chips following oral care. Meds and nutrition via alternate route.  -       SLP Evaluation Clinical Impression    SLP Swallowing Diagnosis oral dysphagia;pharyngeal dysphagia  -    Functional Impact risk of aspiration/pneumonia;risk of malnutrition;risk of dehydration  -    Rehab Potential/Prognosis, Swallowing good, to achieve stated therapy goals  -    Swallow Criteria for Skilled Therapeutic Interventions Met demonstrates skilled criteria  -       SLP Treatment Clinical Impressions    Barriers to Overall Progress (SLP) Cognitive status;Baseline deficits  -    Care Plan Review evaluation/treatment results reviewed  -       Recommendations    Therapy Frequency (Swallow) PRN  -HT    Predicted Duration Therapy Intervention (Days) until discharge  -    SLP Diet Recommendation NPO;other (see comments)  ice chips with oral care BID prn  -HT    Recommended Diagnostics VFSS (MBS)  -    Recommended Precautions and Strategies upright posture during/after eating;assist with feeding  -    Oral Care Recommendations Oral Care BID/PRN  -HT    SLP Rec. for Method of Medication Administration meds via alternate route  -    Monitor for Signs of Aspiration yes;notify SLP if any concerns  -    Anticipated Discharge Disposition (SLP) skilled nursing facility  -       Swallow Goals (SLP)    Swallow LTGs Swallow Long Term Goal (free text)  -       (LTG) Swallow    (LTG) Swallow Pt will tolerate safest and least restrictive diet.  -    [place] (Swallow Long Term Goal) with moderate cues (50-74% " accuracy)  -HT    Time Frame (Swallow Long Term Goal) by discharge  -HT              User Key  (r) = Recorded By, (t) = Taken By, (c) = Cosigned By      Initials Name Effective Dates    Chelo Hare SLP 09/14/23 -                     EDUCATION  The patient has been educated in the following areas:   Dysphagia (Swallowing Impairment).        SLP GOALS       Row Name 09/21/24 1100             (LTG) Swallow    (LTG) Swallow Pt will tolerate safest and least restrictive diet.  -HT      [place] (Swallow Long Term Goal) with moderate cues (50-74% accuracy)  -HT      Time Frame (Swallow Long Term Goal) by discharge  -HT                User Key  (r) = Recorded By, (t) = Taken By, (c) = Cosigned By      Initials Name Provider Type    Chelo Hare SLP Speech and Language Pathologist                         Time Calculation:    Time Calculation- SLP       Row Name 09/21/24 1614             Time Calculation- SLP    SLP Start Time 1300  -HT      SLP Received On 09/21/24  -HT         Untimed Charges    26986-FH Eval Oral Pharyng Swallow Minutes 60  -HT         Total Minutes    Untimed Charges Total Minutes 60  -HT       Total Minutes 60  -HT                User Key  (r) = Recorded By, (t) = Taken By, (c) = Cosigned By      Initials Name Provider Type    Chelo Hare SLP Speech and Language Pathologist                    Therapy Charges for Today       Code Description Service Date Service Provider Modifiers Qty    [id number] HC ST EVAL ORAL PHARYNG SWALLOW 4 9/21/2024 Chelo Bowden SLP GN 1                 BRANDON Cervantes  9/21/2024

## 2024-09-21 NOTE — CONSULTS
Internal Medicine Consult  INTERNAL MEDICINE   [facility]       Patient Identification:  Name: Anthony Gallegos  Age: 79 y.o.  Sex: male  :  1945  MRN: [id number]                   Primary Care Physician: Keshav Eason MD                               Requesting physician: Dr. Thakkar  Date of consultation: 2024    Reason for consultation: Take oral medical management out of ICU    History of Present Illness:   Patient is a 79-year-old male with complicated past medical history including chronic dysphagia with PEG tube, malnutrition, failure to thrive, enclosing spondylitis, immobility, chronic anemia, recent hospitalization from 2024 through 2024 for sepsis due to multiple decubitus wound involving the sacral area, left foot and was noted to have osteomyelitis and UTI.  During his hospitalization patient was evaluated by ID podiatry and general surgery service for his chronic wounds and osteomyelitis and was assessed as these are not curable and palliative wound care was recommended.  Patient was placed under hospice care and was subsequently discharged to nursing home with hospitalist to follow-up.  In this background patient was sent to the hospital on 2024 for evaluation of altered mental status and increased lethargy and somnolence.  Patient was found to be significantly hypoxic in the emergency room eventually requiring intubation.  Patient was noted to have extensive lung infiltrate, gastric residue and markers for sepsis was elevated including elevated lactic acid level procalcitonin level.  Patient was treated with pressors antibiotic therapy and was admitted to ICU.  Patient was treated with broad-spectrum antibiotics for healthcare associated aspiration pneumonia consisting of meropenem and vancomycin and on 2024 his antibiotics regimen was simplified to vancomycin and ceftriaxone based on the respiratory culture showing evidence of E. coli and MRSA.   His blood cultures and urine cultures and COVID test have been negative.  Patient has been weaned off pressors and liberated from ventilator and currently placed on positive pressure ventilation and BiPAP.  Patient himself is interactive but unable to engage because of the BiPAP in place and nods that he is feeling better.  Is considered stable and ready to be moved from intensive care to telemetry unit.  A service is consulted to continue his ongoing care out of ICU until he is discharged to facility.  He was weaned off pressors and sedation.    Past Medical History:  Past Medical History:   Diagnosis Date    Abscess of scrotum     MARTITA (acute kidney injury)     Altered mental state     Arthritis     AS (ankylosing spondylitis)     History of MRSA infection     Hypertension     Leukocytosis     Tetrahydrocannabinol (THC) use disorder, mild, abuse 12/20/2023    Uveitis      Past Surgical History:  Past Surgical History:   Procedure Laterality Date    COLONOSCOPY      ENDOSCOPY W/ PEG TUBE PLACEMENT N/A 03/29/2024    Procedure: ESOPHAGOGASTRODUODENOSCOPY WITH PERCUTANEOUS ENDOSCOPIC GASTROSTOMY TUBE INSERTION;  Surgeon: Khadar Broderick MD;  Location: [facility] ENDOSCOPY;  Service: General;  Laterality: N/A;  PRE/POST - DYSPHAGIA    ROTATOR CUFF REPAIR Right     TOTAL HIP ARTHROPLASTY Left 2014    UPPER GASTROINTESTINAL ENDOSCOPY      WOUND DEBRIDEMENT N/A 08/20/2024    Procedure: DEBRIDEMENT SACRAL ULCER/WOUND;  Surgeon: Justine Roque MD;  Location: [facility] OR;  Service: General;  Laterality: N/A;    WOUND DEBRIDEMENT Left 08/20/2024    Procedure: LEFT DEBRIDEMENT FOOT;  Surgeon: Renny Duval DPM;  Location: [facility] OR;  Service: Podiatry;  Laterality: Left;      Home Meds:  Medications Prior to Admission   Medication Sig Dispense Refill Last Dose    Ascorbic Acid (VITAMIN C PO) Take 500 mg by mouth Daily.   9/16/2024    atorvastatin (LIPITOR) 40 MG tablet Take 1 tablet by mouth Every Night.    9/16/2024    bisacodyl (DULCOLAX) 10 MG suppository Insert 1 suppository into the rectum Daily As Needed for Constipation (Use if bisacodyl oral is ineffective).       bisacodyl (DULCOLAX) 5 MG EC tablet Take 1 tablet by mouth Daily As Needed for Constipation (Use if polyethylene glycol is ineffective).       calcium carbonate (TUMS) 500 MG chewable tablet Chew 2 tablets 2 (Two) Times a Day As Needed for Heartburn.       collagenase 250 UNIT/GM ointment Apply 1 Application topically to the appropriate area as directed Daily.   9/16/2024    cyclobenzaprine (FLEXERIL) 10 MG tablet Take 0.5 tablets by mouth At Night As Needed.       HYDROcodone-acetaminophen (NORCO) 5-325 MG per tablet Take 1 tablet by mouth 3 (Three) Times a Day As Needed.   9/16/2024    lansoprazole (PREVACID SOLUTAB) 30 MG Tablet Delayed Release Dispersible disintegrating tablet Administer 1 tablet per G tube Every Morning.   9/16/2024    levothyroxine (SYNTHROID, LEVOTHROID) 25 MCG tablet Take 1 tablet by mouth Daily.   9/16/2024    melatonin 3 MG tablet Take 1 tablet by mouth At Night As Needed for Sleep.       methotrexate 2.5 MG tablet Take 1 tablet by mouth 1 (One) Time Per Week. Take 2.5 mg on Wednesday and 2.5 mg on Thursday.  Do not take any medication on Friday through Tuesday. (Patient taking differently: Take 1 tablet by mouth 2 (Two) Times a Week. Take 2.5 mg on Wednesday and 2.5 mg on Thursday.  Do not take any medication on Friday through Tuesday.)   Past Week    metoprolol succinate XL (TOPROL-XL) 25 MG 24 hr tablet Take 1 tablet by mouth Daily.   9/16/2024    polyethylene glycol (MIRALAX) 17 g packet Take 17 g by mouth Daily As Needed (Use if senna-docusate is ineffective).       Potassium Bicarb-Citric Acid (Effer-K) 20 MEQ effervescent tablet Take 1 tablet by mouth Daily.   9/16/2024    potassium chloride (KAYCIEL) 20 mEq/15 mL solution Take 15 mL by mouth Daily.   9/16/2024    sennosides-docusate (PERICOLACE) 8.6-50 MG per tablet  Take 2 tablets by mouth 2 (Two) Times a Day.   9/16/2024    sodium hypochlorite (DAKIN'S 1/4 STRENGTH) 0.125 % solution topical solution 0.125% Apply  topically to the appropriate area as directed 3 times a day.   9/16/2024    terazosin (HYTRIN) 1 MG capsule Administer 1 capsule per G tube Every Night. (Patient taking differently: Take 1 capsule by mouth Every Night.)   9/16/2024    Wound Dressings (Medihoney Wound/Burn Dressing) Paste Apply 1 Application topically to the appropriate area as directed Daily.   9/16/2024     Current Meds:     Current Facility-Administered Medications:     acetaminophen (TYLENOL) tablet 650 mg, 650 mg, Per PEG Tube, Q4H PRN, 650 mg at 09/21/24 0507 **OR** acetaminophen (TYLENOL) suppository 650 mg, 650 mg, Rectal, Q4H PRN, Dez Thakkar MD    atorvastatin (LIPITOR) tablet 40 mg, 40 mg, Per PEG Tube, Daily, Dez Thakkar MD, 40 mg at 09/21/24 0858    sennosides-docusate (PERICOLACE) 8.6-50 MG per tablet 2 tablet, 2 tablet, Per PEG Tube, BID, 2 tablet at 09/20/24 2030 **AND** polyethylene glycol (MIRALAX) packet 17 g, 17 g, Per PEG Tube, Daily PRN **AND** bisacodyl (DULCOLAX) EC tablet 5 mg, 5 mg, Oral, Daily PRN **AND** bisacodyl (DULCOLAX) suppository 10 mg, 10 mg, Rectal, Daily PRN, Dez Thakkar MD    Calcium Replacement - Follow Nurse / BPA Driven Protocol, , Does not apply, PRN, Dez hTakkar MD    cefTRIAXone (ROCEPHIN) 2,000 mg in sodium chloride 0.9 % 100 mL MBP, 2,000 mg, Intravenous, Q24H, Dez Thakkar MD, Last Rate: 200 mL/hr at 09/21/24 1215, 2,000 mg at 09/21/24 1215    chlorhexidine (PERIDEX) 0.12 % solution 15 mL, 15 mL, Mouth/Throat, Q12H, Dez Thakkar MD, 15 mL at 09/21/24 0859    dexmedetomidine (PRECEDEX) 400 mcg in 100 mL NS infusion, 0.2-1.5 mcg/kg/hr, Intravenous, Titrated, Dez Thakkar MD, Stopped at 09/21/24 0252    dextrose (D50W) (25 g/50 mL) IV injection 25 g, 25 g, Intravenous, Q15 Min PRN, Dez Thakkar  MD [place], 25 g at 09/19/24 0637    dextrose (GLUTOSE) oral gel 15 g, 15 g, Oral, Q15 Min PRN, Dez Thakkar MD    Enoxaparin Sodium (LOVENOX) syringe 40 mg, 40 mg, Subcutaneous, Q24H, Dez Thakkar MD, 40 mg at 09/21/24 1612    glucagon (GLUCAGEN) injection 1 mg, 1 mg, Subcutaneous, Q15 Min PRN, Dez Thakkar MD    guaifenesin (ROBITUSSIN) 100 MG/5ML liquid 200 mg, 200 mg, Per PEG Tube, Q6H, Dez Thakkar MD, 200 mg at 09/21/24 1448    ipratropium-albuterol (DUO-NEB) nebulizer solution 3 mL, 3 mL, Nebulization, 4x Daily - RT, Dez Thakkar MD, 3 mL at 09/21/24 1534    levothyroxine (SYNTHROID, LEVOTHROID) tablet 25 mcg, 25 mcg, Per PEG Tube, Q AM, Dez Thakkar MD, 25 mcg at 09/21/24 0510    Magnesium Standard Dose Replacement - Follow Nurse / BPA Driven Protocol, , Does not apply, PRN, Dez Thakkar MD    nitroglycerin (NITROSTAT) SL tablet 0.4 mg, 0.4 mg, Sublingual, Q5 Min PRN, Dez Thakkar MD    norepinephrine (LEVOPHED) 8 mg in 250 mL NS infusion (premix), 0.02-0.3 mcg/kg/min, Intravenous, Titrated, Dez Thakkar MD, Stopped at 09/21/24 0716    pantoprazole (PROTONIX) injection 40 mg, 40 mg, Intravenous, Q24H, Dez Thakkar MD, 40 mg at 09/21/24 0858    Pharmacy to dose vancomycin, , Does not apply, Continuous PRN, Dez Thakkar MD    Phosphorus Replacement - Follow Nurse / BPA Driven Protocol, , Does not apply, PRNBijan Mark Edwin, MD    Potassium Replacement - Follow Nurse / BPA Driven Protocol, , Does not apply, PRNBijan Mark Edwin, MD    sodium chloride 0.9 % infusion, 50 mL/hr, Intravenous, Continuous, Dez Thakkar MD, Last Rate: 50 mL/hr at 09/21/24 1059, 50 mL/hr at 09/21/24 1059    sodium hypochlorite (DAKIN'S 1/4 STRENGTH) 0.125 % topical solution 0.125% solution, , Topical, Q12H, Dez Thakkar MD, Given at 09/21/24 0858    vancomycin (VANCOCIN) 1,000 mg in sodium chloride 0.9 % 250 mL IVPB-VTB,  1,000 mg, Intravenous, Q12H, Dez Thakkar MD, Last Rate: 250 mL/hr at 09/21/24 1059, 1,000 mg at 09/21/24 1059    vasopressin (PITRESSIN) 20 units in 100 mL NS infusion, 0.03 Units/min, Intravenous, Continuous, Dez Thakkar MD, Stopped at 09/18/24 1200  Allergies:  No Known Allergies  Social History:   Social History     Tobacco Use    Smoking status: Never    Smokeless tobacco: Never   Substance Use Topics    Alcohol use: No      Family History:  Family History   Problem Relation Age of Onset    Alzheimer's disease Mother     Colon cancer Neg Hx     Colon polyps Neg Hx     Crohn's disease Neg Hx     Irritable bowel syndrome Neg Hx     Ulcerative colitis Neg Hx     Malig Hyperthermia Neg Hx           Review of Systems  See history of present illness and past medical history.       Vitals:   /61   Pulse (!) 121   Temp 98.3 °F (36.8 °C) (Oral)   Resp (!) 44   Wt 82.6 kg (182 lb 1.6 oz)   SpO2 90%   BMI 22.76 kg/m²   I/O:   Intake/Output Summary (Last 24 hours) at 9/21/2024 1901  Last data filed at 9/21/2024 1600  Gross per 24 hour   Intake 2205 ml   Output 2500 ml   Net -295 ml     Exam:  Patient is examined using the personal protective equipment as per guidelines from infection control for this particular patient as enacted.  Hand washing was performed before and after patient interaction.  General Appearance:    Alert, cooperative, no distress, appears stated age   Head:    Normocephalic, without obvious abnormality, atraumatic   Eyes:    PERRL, conjunctiva/corneas clear, EOM's intact, both eyes   Ears:    Normal external ear canals, both ears   Nose:   Nares normal, septum midline, mucosa normal, no drainage    or sinus tenderness   Throat:   Lips, tongue, gums normal; oral mucosa pink and moist   Neck: Supple and no adenopathy   Back:     Symmetric, no curvature, ROM normal, no CVA tenderness   Lungs:   Decreased breath sounds at the bases   Chest Wall:    No tenderness or deformity     Heart:    Regular rate and rhythm, S1 and S2 normal, no murmur, rub   or gallop   Abdomen:   PEG tube in place   Extremities: Disuse atrophy and chronic wound involving left foot which is dressed   Pulses:   Pulses palpable in all extremities; symmetric all extremities   Skin: Chronic skin changes multiple decubiti wounds noted                             Neurologic: Awake and interactive but unable to converse because of BiPAP in place.       Data Review:      I reviewed the patient's new clinical results.  Results from last 7 days   Lab Units 09/20/24  0728 09/19/24  0446 09/18/24  0709 09/17/24  1127 09/16/24  2357   WBC 10*3/mm3 12.56* 15.38* 13.50* 14.72* 6.51   HEMOGLOBIN g/dL 8.5* 8.2* 8.3* 9.3* 9.7*   PLATELETS 10*3/mm3 241 252 248 267 345     Results from last 7 days   Lab Units 09/21/24  0710 09/20/24  1122 09/20/24  0728 09/19/24  2212 09/19/24  0446 09/18/24  0708 09/17/24  0516 09/16/24  2357   SODIUM mmol/L  --   --  147*  --  145 140 132* 137   POTASSIUM mmol/L 3.4* 3.0* 2.7* 2.2* 2.3* 3.1* 5.3* 4.8   CHLORIDE mmol/L  --   --  117*  --  114* 109* 104 101   CO2 mmol/L  --   --  24.1  --  24.3 21.9* 17.0* 23.0   BUN mg/dL  --   --  11  --  10 17 23 24*   CREATININE mg/dL 0.36*  --  0.38*  --  0.41* 0.51* 0.84 1.06   CALCIUM mg/dL  --   --  7.7*  --  7.3* 7.7* 8.3* 9.0   GLUCOSE mg/dL  --   --  126*  --  92 63* 84 87     XR Chest 1 View    Result Date: 9/19/2024  FINDINGS AND IMPRESSION: Endotracheal tube tip terminates approximately 7.2 cm above the gabo. Moderate to extensive pulmonary opacification scattered throughout the bilateral lungs with interval worsening within the right upper and lower lung since 9/18/2024 when accounting for differences in technique. Cardiac silhouette is within normal limits for size. No pneumothorax is seen  This report was finalized on 9/19/2024 9:39 AM by Dr. Roddy Chaparro M.D on Workstation: [id number]      XR Chest 1 View    Result Date: 9/18/2024  Endotracheal  tube with tip approximately 5.5 cm above the gabo.  Unchanged hyperinflated hyperlucent lungs likely representing underlying emphysematous changes. Unchanged remaining confluent left lung opacities with central predominance and confluent hazy right lower lobe opacities suggesting pneumonitis/pneumonia. No measurable pleural effusion or pneumothorax.    This report was finalized on 9/18/2024 6:44 AM by Dr. Landon Vaca M.D on Workstation: [id number]      CT Head Without Contrast    Result Date: 9/17/2024  Electronically signed by Landon Osborn MD on 09-17-24 at 0601    XR Chest 1 View    Result Date: 9/17/2024  Electronically signed by Manasa Reina MD on 09-17-24 at 0203    XR Chest 1 View    Result Date: 9/17/2024  Electronically signed by Manasa Reina MD on 09-17-24 at 0141   Microbiology Results (last 10 days)       Procedure Component Value - Date/Time    CANDIDA AURIS SCREEN - Swab, Axilla Right, Axilla Left and Groin [[id number]]  (Normal) Collected: 09/18/24 1148    Lab Status: Preliminary result Specimen: Swab from Axilla Right, Axilla Left and Groin Updated: 09/21/24 1405     Candida Auris Screen Culture No Candida auris isolated at 3 days    MRSA Screen, PCR (Inpatient) - Swab, Nares [[id number]]  (Abnormal) Collected: 09/17/24 1018    Lab Status: Final result Specimen: Swab from Nares Updated: 09/17/24 1309     MRSA PCR MRSA Detected    Narrative:      The negative predictive value of this diagnostic test is high and should only be used to consider de-escalating anti-MRSA therapy. A positive result may indicate colonization with MRSA and must be correlated clinically.    Legionella Antigen, Urine - Urine, Indwelling Urethral Catheter [[id number]]  (Normal) Collected: 09/17/24 0700    Lab Status: Final result Specimen: Urine from Indwelling Urethral Catheter Updated: 09/17/24 0729     LEGIONELLA ANTIGEN, URINE Negative    S. Pneumo Ag Urine or CSF - Urine, Indwelling Urethral Catheter  [[id number]]  (Normal) Collected: 09/17/24 0700    Lab Status: Final result Specimen: Urine from Indwelling Urethral Catheter Updated: 09/17/24 0729     Strep Pneumo Ag Negative    Respiratory Culture - Lavage, ET Suction [[id number]]  (Abnormal)  (Susceptibility) Collected: 09/17/24 0536    Lab Status: Final result Specimen: Lavage from ET Suction Updated: 09/20/24 0913     Respiratory Culture Moderate growth (3+) Staphylococcus aureus, MRSA     Comment:   Methicillin resistant Staphylococcus aureus, Patient may be an isolation risk.         Moderate growth (3+) Escherichia coli      Scant growth (1+) Normal Respiratory Lety     Gram Stain Many (4+) WBCs seen      Rare (1+) Epithelial cells seen      Rare (1+) Gram positive cocci     Comment: Modified report. Previous result was Gram positive cocci on 9/17/2024 at 0755 EDT.         Few (2+) Gram negative bacilli    Narrative:            Susceptibility        Staphylococcus aureus, MRSA      BELINDA      Clindamycin Resistant      Linezolid Susceptible      Oxacillin Resistant      Tetracycline Susceptible      Trimethoprim + Sulfamethoxazole Susceptible      Vancomycin Susceptible                       Susceptibility        Escherichia coli      BELINDA      Amikacin Susceptible      Amoxicillin + Clavulanate Susceptible      Ampicillin Resistant      Ampicillin + Sulbactam Intermediate      Cefepime Susceptible      Ceftazidime Susceptible      Ceftriaxone Susceptible      Gentamicin Resistant      Levofloxacin Resistant      Piperacillin + Tazobactam Susceptible      Tetracycline Resistant      Tobramycin Intermediate      Trimethoprim + Sulfamethoxazole Resistant                       Susceptibility Comments       Staphylococcus aureus, MRSA    This isolate is presumed to be clindamycin resistant based on detection of inducible clindamycin resistance.  Clindamycin may still be effective in some patients.  This isolate is presumed to be clindamycin resistant based on  detection of inducible clindamycin resistance.  Clindamycin may still be effective in some patients.      Escherichia coli    Cefazolin sensitivity will not be reported for Enterobacteriaceae in non-urine isolates. If cefazolin is preferred, please call the microbiology lab to request an E-test.  With the exception of urinary-sourced infections, aminoglycosides should not be used as monotherapy.               Respiratory Panel PCR w/COVID-19(SARS-CoV-2) CALDERON/TRAM/KELLE/PAD/COR/JOSEPH In-House, NP Swab in UTM/VTM, 2 HR TAT - Swab, Nasopharynx [[id number]]  (Normal) Collected: 09/17/24 0111    Lab Status: Final result Specimen: Swab from Nasopharynx Updated: 09/17/24 0218     ADENOVIRUS, PCR Not Detected     Coronavirus 229E Not Detected     Coronavirus HKU1 Not Detected     Coronavirus NL63 Not Detected     Coronavirus OC43 Not Detected     COVID19 Not Detected     Human Metapneumovirus Not Detected     Human Rhinovirus/Enterovirus Not Detected     Influenza A PCR Not Detected     Influenza B PCR Not Detected     Parainfluenza Virus 1 Not Detected     Parainfluenza Virus 2 Not Detected     Parainfluenza Virus 3 Not Detected     Parainfluenza Virus 4 Not Detected     RSV, PCR Not Detected     Bordetella pertussis pcr Not Detected     Bordetella parapertussis PCR Not Detected     Chlamydophila pneumoniae PCR Not Detected     Mycoplasma pneumo by PCR Not Detected    Narrative:      In the setting of a positive respiratory panel with a viral infection PLUS a negative procalcitonin without other underlying concern for bacterial infection, consider observing off antibiotics or discontinuation of antibiotics and continue supportive care. If the respiratory panel is positive for atypical bacterial infection (Bordetella pertussis, Chlamydophila pneumoniae, or Mycoplasma pneumoniae), consider antibiotic de-escalation to target atypical bacterial infection.    Urine Culture - Urine, Urine, Catheter [[id number]]  (Normal) Collected:  09/17/24 0110    Lab Status: Final result Specimen: Urine, Catheter Updated: 09/18/24 0749     Urine Culture No growth    Blood Culture - Blood, Arm, Left [[id number]]  (Normal) Collected: 09/16/24 2358    Lab Status: Final result Specimen: Blood from Arm, Left Updated: 09/22/24 0015     Blood Culture No growth at 5 days    Blood Culture - Blood, Arm, Right [[id number]]  (Normal) Collected: 09/16/24 2357    Lab Status: Final result Specimen: Blood from Arm, Right Updated: 09/22/24 0030     Blood Culture No growth at 5 days            Assessment:  Active Hospital Problems    Diagnosis  POA    **Sepsis [A41.9]  Yes    Septic shock [A41.9, R65.21]  Yes   Healthcare associated aspiration pneumonia with latest respiratory culture positive for MRSA and E. coli  Immobility and chronic decubiti with osteomyelitis considered noncurable with recommendation for palliative care during previous hospitalization  Dysphagia  MRSA colonization  Anemia  Hypothyroidism  Ankylosing spondylitis    Plan:  Continue present management with IV antibiotics, nebulizer treatment as needed BiPAP under the direction of pulmonary service and continue with tube feeds with aspiration precaution and ongoing wound care for decubiti.  [facility] will assume care  Case management consultation for arrangement of discharge back to nursing facility under hospice    Chaka Carballo MD   9/21/2024  19:01 EDT    Parts of this note may be an electronic transcription/translation of spoken language to printed text using the Dragon dictation system.

## 2024-09-21 NOTE — PLAN OF CARE
"Goal Outcome Evaluation:              Outcome Evaluation: Clinical bedside swallow evaluation completed. Pt oriented to self only and is a poor historian. Pt with productive cough. Swallow delayed with tongue pumping when subjectively palpated. Provided ice chip trials with good manipulation re bolus formation with trigger swallow. No overt clinical s/s of aspiration observed at bedside. Pt expressed desire to return to food or drink \"even just a little bit.\" With pt's chronic history of dysphagia and high risk of aspiration, recommend VFSS to be completed to assess safety and risk of PO foods and drinks. Attempted VFSS this date however nsg feels pt is not yet stable. Recommend new orders for VFSS when pt is stable and prior to d/c. Recommmend continue NPO status with ice chips following oral care. Meds and nutrition via alternate route.      Anticipated Discharge Disposition (SLP): skilled nursing facility          SLP Swallowing Diagnosis: oral dysphagia, pharyngeal dysphagia (09/21/24 1100)             "

## 2024-09-21 NOTE — PROGRESS NOTES
[facility] INPATIENT PROGRESS NOTE         [facility] CORONARY CARE    2024      PATIENT IDENTIFICATION:  Name: Anthony Gallegos ADMIT: 2024   : 1945  PCP: Keshav Eason MD    MRN: [id number] LOS: 4 days   AGE/SEX: 79 y.o. male  ROOM: [facility]                     LOS 4    Reason for visit: Respiratory failure with sepsis      SUBJECTIVE:      Successfully extubated yesterday.  On 2 L supplemental oxygen.  Discussed with respiratory staff at bedside.  Patient complains of mild weak cough.  Scattered coarse breath sounds bilaterally.  Tolerating tube feeds. I am seeing the patient for the first time today.  All patient problems are new to me.      Objective   OBJECTIVE:    Vital Sign Min/Max for last 24 hours  Temp  Min: 98 °F (36.7 °C)  Max: 99.9 °F (37.7 °C)   BP  Min: 92/62  Max: 138/54   Pulse  Min: 99  Max: 124   Resp  Min: 16  Max: 32   SpO2  Min: 89 %  Max: 98 %   No data recorded   Weight  Min: 82.6 kg (182 lb 1.6 oz)  Max: 82.6 kg (182 lb 1.6 oz)    Vitals:    24 0945 24 1000 24 1047 24 1053   BP: 126/57 114/53     BP Location:       Patient Position:       Pulse: 114 114 117 116   Resp:   24 (!) 32   Temp:    98 °F (36.7 °C)   TempSrc:    Oral   SpO2: 94% 94% 92% 96%   Weight:                24  0558 24  0600 24  0600   Weight: 72.8 kg (160 lb 7.9 oz) 74.8 kg (164 lb 14.5 oz) 82.6 kg (182 lb 1.6 oz)       Body mass index is 22.76 kg/m².                    FiO2 (%): 26 %     Body mass index is 22.76 kg/m².    Intake/Output Summary (Last 24 hours) at 2024 1334  Last data filed at 2024 1215  Gross per 24 hour   Intake 2799 ml   Output 3400 ml   Net -601 ml         Exam:  GEN:  No distress, appears stated age  EYES:   PERRL, anicteric sclerae  ENT:    External ears/nose normal, OP clear  NECK:  No adenopathy, midline trachea  LUNGS: Normal chest on inspection, palpation and auscultation  CV:  Normal S1S2, without  murmur  ABD:  Nontender, nondistended, no hepatosplenomegaly, +BS  EXT:  No edema.  No cyanosis or clubbing.  No mottling and normal cap refill.    Assessment     Scheduled meds:  atorvastatin, 40 mg, Per PEG Tube, Daily  cefTRIAXone, 2,000 mg, Intravenous, Q24H  chlorhexidine, 15 mL, Mouth/Throat, Q12H  enoxaparin, 40 mg, Subcutaneous, Q24H  guaifenesin, 200 mg, Per PEG Tube, Q6H  ipratropium-albuterol, 3 mL, Nebulization, 4x Daily - RT  levothyroxine, 25 mcg, Per PEG Tube, Q AM  pantoprazole, 40 mg, Intravenous, Q24H  potassium chloride, 40 mEq, Oral, Q4H  senna-docusate sodium, 2 tablet, Per PEG Tube, BID  sodium hypochlorite, , Topical, Q12H  vancomycin, 1,000 mg, Intravenous, Q12H      IV meds:                      dexmedetomidine, 0.2-1.5 mcg/kg/hr, Last Rate: Stopped (09/21/24 0252)  norepinephrine, 0.02-0.3 mcg/kg/min, Last Rate: Stopped (09/21/24 0716)  Pharmacy to dose vancomycin,   sodium chloride, 50 mL/hr, Last Rate: 50 mL/hr (09/21/24 1059)  vasopressin, 0.03 Units/min, Last Rate: Stopped (09/18/24 1200)      Data Review:  Results from last 7 days   Lab Units 09/21/24  0710 09/20/24  1122 09/20/24  0728 09/19/24  2212 09/19/24  0446 09/18/24  0708 09/17/24  0516 09/16/24  2357   SODIUM mmol/L  --   --  147*  --  145 140 132* 137   POTASSIUM mmol/L 3.4* 3.0* 2.7* 2.2* 2.3* 3.1* 5.3* 4.8   CHLORIDE mmol/L  --   --  117*  --  114* 109* 104 101   CO2 mmol/L  --   --  24.1  --  24.3 21.9* 17.0* 23.0   BUN mg/dL  --   --  11  --  10 17 23 24*   CREATININE mg/dL 0.36*  --  0.38*  --  0.41* 0.51* 0.84 1.06   GLUCOSE mg/dL  --   --  126*  --  92 63* 84 87   CALCIUM mg/dL  --   --  7.7*  --  7.3* 7.7* 8.3* 9.0         Estimated Creatinine Clearance: 191.2 mL/min (A) (by C-G formula based on SCr of 0.36 mg/dL (L)).  Results from last 7 days   Lab Units 09/20/24  0728 09/19/24  0446 09/18/24  0709 09/17/24  1127 09/16/24  2357   WBC 10*3/mm3 12.56* 15.38* 13.50* 14.72* 6.51   HEMOGLOBIN g/dL 8.5* 8.2* 8.3* 9.3*  9.7*   PLATELETS 10*3/mm3 241 252 248 267 345         Results from last 7 days   Lab Units 09/16/24  2357   ALT (SGPT) U/L 24   AST (SGOT) U/L 27     Results from last 7 days   Lab Units 09/20/24  0921 09/19/24  0341 09/18/24  0357 09/17/24  0641 09/17/24  0339 09/17/24  0009   PH, ARTERIAL pH units 7.475* 7.503* 7.420 7.428 7.243* 7.295*   PO2 ART mm Hg 66.3* 67.0* 109.1* 85.5 228.6* 68.0*   PCO2, ARTERIAL mm Hg 32.6* 28.8* 36.0 37.8 64.2* 45.1*   HCO3 ART mmol/L 24.0 22.6 23.3 25.0 27.7 21.9*     Results from last 7 days   Lab Units 09/18/24  0709 09/17/24  2308 09/17/24  1638 09/17/24  1127 09/17/24  0516 09/16/24  2357   PROCALCITONIN ng/mL  --   --   --   --   --  68.40*   LACTATE mmol/L 2.0 2.7* 4.9* 5.2* 5.3* 6.3*         Glucose   Date/Time Value Ref Range Status   09/21/2024 1103 93 70 - 130 mg/dL Final   09/21/2024 0638 116 70 - 130 mg/dL Final   09/20/2024 2350 106 70 - 130 mg/dL Final   09/20/2024 1744 110 70 - 130 mg/dL Final   09/20/2024 1138 113 70 - 130 mg/dL Final   09/20/2024 0658 126 70 - 130 mg/dL Final   09/20/2024 0052 143 (H) 70 - 130 mg/dL Final         Imaging reviewed  Most recent chest x-ray 9/19 reviewed    Microbiology reviewed  MRSA and E. coli growing in respiratory culture from 9/17          Active Hospital Problems    Diagnosis  POA    **Sepsis [A41.9]  Yes    Septic shock [A41.9, R65.21]  Yes      Resolved Hospital Problems   No resolved problems to display.         ASSESSMENT:  Septic shock, present on admission  Bilateral pneumonia, MRSA  Acute hypoxemic and hypercapnic respiratory failure, requiring mechanical ventilation 9/16  Metabolic encephalopathy  Lactic acidosis, resolved  Elevated troponin  Severe protein calorie malnutrition/hypoalbuminemia (albumin 2)  Slightly elevated troponin, probably non-STEMI type II  Positive MRSA screen  Hypokalemia  Ankylosing spondylitis: On methotrexate as outpatient  Chronic indwelling Mariscal catheter  Dysphagia with PEG tube  Failure to  thrive  History of osteomyelitis  ESBL in urine culture 8/17/2024  Sacral pressure ulcer: Debrided 8/20/2024  Hypothyroidism  Chronic anemia      PLAN:  Successfully extubated yesterday.    Encourage pulmonary toilet.  Weaning oxygen as able.  Off Precedex.  Off pressors.  PT/ST/OT.  No longer requiring critical care management.  Transfer to telemetry.  Consult hospitalist service to take over medical management outside of ICU.    Dez Thakkar MD  Pulmonary and Critical Care Medicine  [facility], [facility]  9/21/2024    13:34 EDT

## 2024-09-22 PROBLEM — J96.02 ACUTE RESPIRATORY FAILURE WITH HYPOXIA AND HYPERCAPNIA: Status: ACTIVE | Noted: 2024-09-22

## 2024-09-22 PROBLEM — J96.01 ACUTE RESPIRATORY FAILURE WITH HYPOXIA AND HYPERCAPNIA: Status: ACTIVE | Noted: 2024-01-01

## 2024-09-22 PROBLEM — I21.4 NSTEMI (NON-ST ELEVATED MYOCARDIAL INFARCTION): Status: ACTIVE | Noted: 2024-09-22

## 2024-09-22 PROBLEM — J15.212 PNEUMONIA OF BOTH LUNGS DUE TO METHICILLIN RESISTANT STAPHYLOCOCCUS AUREUS (MRSA): Status: ACTIVE | Noted: 2024-01-01

## 2024-09-22 PROBLEM — E87.6 HYPOKALEMIA: Status: ACTIVE | Noted: 2024-01-01

## 2024-09-22 PROBLEM — M86.272 SUBACUTE OSTEOMYELITIS OF LEFT FOOT: Status: ACTIVE | Noted: 2024-01-01

## 2024-09-22 PROBLEM — J69.0 PNEUMONIA, ASPIRATION: Status: ACTIVE | Noted: 2024-01-01

## 2024-09-22 NOTE — PROGRESS NOTES
Name: Anthony Gallegos ADMIT: 2024   : 1945  PCP: Keshav Eason MD    MRN: [id number] LOS: 5 days   AGE/SEX: 79 y.o. male  ROOM: [facility]     Subjective   Subjective   Pt unable to answer any questions this AM. Speech unintelligible. Lots of upper airway congestion.       Objective   Objective   Vital Signs  Temp:  [98 °F (36.7 °C)-98.5 °F (36.9 °C)] 98 °F (36.7 °C)  Heart Rate:  [110-123] 110  Resp:  [24-44] 36  BP: (102-156)/(53-82) 156/73  SpO2:  [88 %-98 %] 98 %  on  Flow (L/min):  [2-5] 5;   Device (Oxygen Therapy): nasal cannula  Body mass index is 22.76 kg/m².  Physical Exam  Vitals and nursing note reviewed.   Constitutional:       General: He is not in acute distress.     Appearance: He is ill-appearing. He is not toxic-appearing or diaphoretic.   HENT:      Head: Normocephalic.      Nose: Nose normal.      Mouth/Throat:      Mouth: Mucous membranes are dry.      Pharynx: Oropharynx is clear.   Eyes:      General: No scleral icterus.        Right eye: No discharge.         Left eye: No discharge.      Conjunctiva/sclera: Conjunctivae normal.   Cardiovascular:      Rate and Rhythm: Normal rate and regular rhythm.      Pulses: Normal pulses.   Pulmonary:      Effort: Pulmonary effort is normal. No respiratory distress.      Breath sounds: No wheezing or rales.      Comments: Coarse upper airway noise  Abdominal:      General: Bowel sounds are normal. There is no distension.      Palpations: Abdomen is soft.      Tenderness: There is no abdominal tenderness.      Comments: PEG looks fine   Musculoskeletal:         General: Swelling (trace in all extremities) present.      Cervical back: Neck supple.      Comments: Heel boots in place  SCDs in place  Dressing to left heel   Skin:     General: Skin is warm and dry.      Capillary Refill: Capillary refill takes 2 to 3 seconds.      Coloration: Skin is not jaundiced.   Neurological:      Mental Status: He is alert.      Cranial Nerves: No cranial  nerve deficit.      Motor: No weakness.   Psychiatric:      Comments: Unable to assess       Results Review     I reviewed the patient's new clinical results.  Results from last 7 days   Lab Units 09/20/24 0728 09/19/24 0446 09/18/24  0709 09/17/24  1127   WBC 10*3/mm3 12.56* 15.38* 13.50* 14.72*   HEMOGLOBIN g/dL 8.5* 8.2* 8.3* 9.3*   PLATELETS 10*3/mm3 241 252 248 267     Results from last 7 days   Lab Units 09/22/24  0814 09/21/24  1902 09/21/24  0710 09/20/24  1122 09/20/24 0728 09/19/24 2212 09/19/24 0446 09/18/24 0708 09/17/24  0516   SODIUM mmol/L  --   --   --   --  147*  --  145 140 132*   POTASSIUM mmol/L 3.6 3.3* 3.4* 3.0* 2.7*   < > 2.3* 3.1* 5.3*   CHLORIDE mmol/L  --   --   --   --  117*  --  114* 109* 104   CO2 mmol/L  --   --   --   --  24.1  --  24.3 21.9* 17.0*   BUN mg/dL  --   --   --   --  11  --  10 17 23   CREATININE mg/dL 0.42*  --  0.36*  --  0.38*  --  0.41* 0.51* 0.84   GLUCOSE mg/dL  --   --   --   --  126*  --  92 63* 84   EGFR mL/min/1.73 108.7  --  113.9  --  112.0  --  109.5 102.5 88.2    < > = values in this interval not displayed.     Results from last 7 days   Lab Units 09/16/24  2357   ALBUMIN g/dL 2.3*   BILIRUBIN mg/dL 0.4   ALK PHOS U/L 70   AST (SGOT) U/L 27   ALT (SGPT) U/L 24     Results from last 7 days   Lab Units 09/21/24 1902 09/20/24 0728 09/19/24 0446 09/18/24  0708 09/17/24  0516 09/16/24  2357   CALCIUM mg/dL  --  7.7* 7.3* 7.7* 8.3* 9.0   ALBUMIN g/dL  --   --   --   --   --  2.3*   MAGNESIUM mg/dL 1.8  --  1.6  --   --   --      Results from last 7 days   Lab Units 09/18/24  0709 09/17/24  2308 09/17/24  1638 09/17/24  1127 09/17/24  0516 09/16/24 2357   PROCALCITONIN ng/mL  --   --   --   --   --  68.40*   LACTATE mmol/L 2.0 2.7* 4.9* 5.2*   < > 6.3*    < > = values in this interval not displayed.     Glucose   Date/Time Value Ref Range Status   09/22/2024 0646 92 70 - 130 mg/dL Final   09/21/2024 2301 120 70 - 130 mg/dL Final   09/21/2024 1527 91 70 -  130 mg/dL Final   09/21/2024 1103 93 70 - 130 mg/dL Final   09/21/2024 0638 116 70 - 130 mg/dL Final   09/20/2024 2350 106 70 - 130 mg/dL Final   09/20/2024 1744 110 70 - 130 mg/dL Final       No radiology results for the last day    I have personally reviewed all medications:  Scheduled Medications  atorvastatin, 40 mg, Per PEG Tube, Daily  cefTRIAXone, 2,000 mg, Intravenous, Q24H  chlorhexidine, 15 mL, Mouth/Throat, Q12H  enoxaparin, 40 mg, Subcutaneous, Q24H  guaifenesin, 200 mg, Per PEG Tube, Q6H  ipratropium-albuterol, 3 mL, Nebulization, 4x Daily - RT  levothyroxine, 25 mcg, Per PEG Tube, Q AM  pantoprazole, 40 mg, Intravenous, Q24H  senna-docusate sodium, 2 tablet, Per PEG Tube, BID  sodium hypochlorite, , Topical, Q12H  vancomycin, 1,000 mg, Intravenous, Q12H    Infusions  dexmedetomidine, 0.2-1.5 mcg/kg/hr, Last Rate: Stopped (09/21/24 0252)  norepinephrine, 0.02-0.3 mcg/kg/min, Last Rate: Stopped (09/21/24 0716)  Pharmacy to dose vancomycin,   sodium chloride, 50 mL/hr, Last Rate: 50 mL/hr (09/21/24 2100)  vasopressin, 0.03 Units/min, Last Rate: Stopped (09/18/24 1200)    Diet  NPO Diet NPO Type: Tube Feeding    I have personally reviewed:  [x]  Laboratory   [x]  Microbiology   [x]  Radiology   [x]  EKG/Telemetry  []  Cardiology/Vascular   []  Pathology    [x]  Records       Assessment/Plan     Active Hospital Problems    Diagnosis  POA    **Pneumonia of both lungs due to methicillin resistant Staphylococcus aureus (MRSA) [J15.212]  Yes    Pneumonia, aspiration [J69.0]  Yes    Acute respiratory failure with hypoxia and hypercapnia [J96.01, J96.02]  Yes    NSTEMI (non-ST elevated myocardial infarction) [I21.4]  Yes    Subacute osteomyelitis of left foot [M86.272]  Yes    Hypokalemia [E87.6]  No    Septic shock [A41.9, R65.21]  Yes    Hypothyroidism (acquired) [E03.9]  Yes    Sacral wound [S31.000A]  Yes    Hyperlipidemia [E78.5]  Yes    Feeding by G-tube [Z93.1]  Not Applicable    Indwelling Mariscal catheter  present [Z97.8]  Not Applicable    Anemia [D64.9]  Yes    Failure to thrive in adult [R62.7]  Yes    Dysphagia [R13.10]  Yes    GERD without esophagitis [K21.9]  Yes    DISH (disseminated idiopathic skeletal hyperostosis) [M48.10]  Yes    Immobility [Z74.09]  Yes    Essential hypertension [I10]  Yes    Ankylosing spondylitis [M45.9]  Yes      Resolved Hospital Problems   No resolved problems to display.       78yo gentleman with ankylosing spondylitis, HTN, BPH, chronic forde, h/o ESBL E.coli UTI, GERD, HLD, [place], ACD, hypoT4, O/P dysphagia chronically on tube feeds, and recent admission with multiple pressure wounds and left calcaneal osteomyelitis, who presented to ER from SNF with altered mental status, hypotension, and acute hypoxic resp failure. He was intubated in ER and admitted to ICU with severe sepsis due to aspiration PNA. We were asked to assume care when pt transitioned out of ICU.     Septic shock  Bilateral aspiration PNA due to MRSA and E.coli  Acute hypoxic and hypercapnic resp failure  Mgmt per Pulm  S/p pressors  Extubated 9/20  ID consulted  Continue Rocephin and Vanc, O2, pulm hygiene efforts  Blood cultures NGTD, urinary Ag's neg, RVP neg  Resp culture grew MRSA and E.coli  No fever or hypotension currently    Type 2 NSTEMI  No ACS    Multiple pressure wounds  Left calcaneal osteomyelitis  WOCN following  Palliative care to left calcaneal wound/osteo per last admission    Hypokalemia  Replaced  Check Mg++ level    Immobility  Chronic forde  Dysphagia, PEG tube  Protein calorie malnutrition, severe  Continue PT  Good UOP  Continue tube feeds, Nutrition following  SLP recommends repeat VFSS when pt able to comply  Previous admission had been on modified diet but did not like to eat (only drank fluids) so his nutrition is primarily via tube feeds--suspect he will have to remain strict NPO from now on    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  Full code.  Discussed with patient and RN.  Anticipate discharge to SNU facility timing yet to be determined.  Expected Discharge Date: 9/23/2024; Expected Discharge Time:       Walter Mancia MD  [facility]  09/22/24  09:52 EDT

## 2024-09-22 NOTE — PLAN OF CARE
Goal Outcome Evaluation:  Plan of Care Reviewed With: patient        Progress: improving  Outcome Evaluation: Alert to self. Poor speech quality. Follows commands. BIPAP removed during shift and placed on 5L NC by Respiratory and maintianing pulse ox. Peg tube patent and tube feedings tolerated. Chronic Mariscal patent. Multiple dressing changes completed. Isolation maintained.

## 2024-09-22 NOTE — PROGRESS NOTES
[facility] INPATIENT PROGRESS NOTE         [facility] CORONARY CARE    2024      PATIENT IDENTIFICATION:  Name: Anthony Gallegos ADMIT: 2024   : 1945  PCP: Keshav Eason MD    MRN: [id number] LOS: 5 days   AGE/SEX: 79 y.o. male  ROOM: [facility]                     LOS 5    Reason for visit: Respiratory failure with sepsis      SUBJECTIVE:      Mild cough.  Weak appearing.  Discussed with nursing staff.  Add flutter valve and incentive spirometer to help with clearance and strength.  Hospitalist service has taken over nonpulmonary medical management.  Appreciate their input.      Objective   OBJECTIVE:    Vital Sign Min/Max for last 24 hours  Temp  Min: 98 °F (36.7 °C)  Max: 98.5 °F (36.9 °C)   BP  Min: 102/62  Max: 156/73   Pulse  Min: 110  Max: 123   Resp  Min: 24  Max: 44   SpO2  Min: 88 %  Max: 98 %   No data recorded   No data recorded    Vitals:    24 0300 24 0400 24 0500 24 0600   BP: 134/77 102/62 105/74 156/73   Pulse: 115 115 113 110   Resp:       Temp:       TempSrc:       SpO2: 92% 92% 98% 98%   Weight:                24  0558 24  0600 24  0600   Weight: 72.8 kg (160 lb 7.9 oz) 74.8 kg (164 lb 14.5 oz) 82.6 kg (182 lb 1.6 oz)       Body mass index is 22.76 kg/m².                    FiO2 (%): 26 %     Body mass index is 22.76 kg/m².    Intake/Output Summary (Last 24 hours) at 2024 0937  Last data filed at 2024 0636  Gross per 24 hour   Intake 2605 ml   Output 2850 ml   Net -245 ml         Exam:  GEN:  No distress, appears stated age.  Very weak appearing  EYES:   PERRL, anicteric sclerae  ENT:    External ears/nose normal, OP clear  NECK:  No adenopathy, midline trachea  LUNGS: Normal chest on inspection, palpation and scattered coarse breath sounds at bases on auscultation  CV:  Normal S1S2, without murmur  ABD:  Nontender, nondistended, no hepatosplenomegaly, +BS  EXT:  No edema.  No cyanosis or clubbing.  No mottling and  normal cap refill.    Assessment     Scheduled meds:  atorvastatin, 40 mg, Per PEG Tube, Daily  cefTRIAXone, 2,000 mg, Intravenous, Q24H  chlorhexidine, 15 mL, Mouth/Throat, Q12H  enoxaparin, 40 mg, Subcutaneous, Q24H  guaifenesin, 200 mg, Per PEG Tube, Q6H  ipratropium-albuterol, 3 mL, Nebulization, 4x Daily - RT  levothyroxine, 25 mcg, Per PEG Tube, Q AM  pantoprazole, 40 mg, Intravenous, Q24H  senna-docusate sodium, 2 tablet, Per PEG Tube, BID  sodium hypochlorite, , Topical, Q12H  vancomycin, 1,000 mg, Intravenous, Q12H      IV meds:                      dexmedetomidine, 0.2-1.5 mcg/kg/hr, Last Rate: Stopped (09/21/24 0252)  norepinephrine, 0.02-0.3 mcg/kg/min, Last Rate: Stopped (09/21/24 0716)  Pharmacy to dose vancomycin,   sodium chloride, 50 mL/hr, Last Rate: 50 mL/hr (09/21/24 2100)  vasopressin, 0.03 Units/min, Last Rate: Stopped (09/18/24 1200)      Data Review:  Results from last 7 days   Lab Units 09/22/24  0814 09/21/24  1902 09/21/24  0710 09/20/24  1122 09/20/24  0728 09/19/24  2212 09/19/24  0446 09/18/24  0708 09/17/24  0516 09/16/24  2357   SODIUM mmol/L  --   --   --   --  147*  --  145 140 132* 137   POTASSIUM mmol/L 3.6 3.3* 3.4* 3.0* 2.7*   < > 2.3* 3.1* 5.3* 4.8   CHLORIDE mmol/L  --   --   --   --  117*  --  114* 109* 104 101   CO2 mmol/L  --   --   --   --  24.1  --  24.3 21.9* 17.0* 23.0   BUN mg/dL  --   --   --   --  11  --  10 17 23 24*   CREATININE mg/dL 0.42*  --  0.36*  --  0.38*  --  0.41* 0.51* 0.84 1.06   GLUCOSE mg/dL  --   --   --   --  126*  --  92 63* 84 87   CALCIUM mg/dL  --   --   --   --  7.7*  --  7.3* 7.7* 8.3* 9.0    < > = values in this interval not displayed.         Estimated Creatinine Clearance: 163.9 mL/min (A) (by C-G formula based on SCr of 0.42 mg/dL (L)).  Results from last 7 days   Lab Units 09/20/24  0728 09/19/24  0446 09/18/24  0709 09/17/24  1127 09/16/24  2357   WBC 10*3/mm3 12.56* 15.38* 13.50* 14.72* 6.51   HEMOGLOBIN g/dL 8.5* 8.2* 8.3* 9.3* 9.7*    PLATELETS 10*3/mm3 241 252 248 267 345         Results from last 7 days   Lab Units 09/16/24  2357   ALT (SGPT) U/L 24   AST (SGOT) U/L 27     Results from last 7 days   Lab Units 09/21/24  1949 09/20/24  0921 09/19/24  0341 09/18/24  0357 09/17/24  0641 09/17/24  0339 09/17/24  0009   PH, ARTERIAL pH units 7.511* 7.475* 7.503* 7.420 7.428 7.243* 7.295*   PO2 ART mm Hg 148.3* 66.3* 67.0* 109.1* 85.5 228.6* 68.0*   PCO2, ARTERIAL mm Hg 35.7 32.6* 28.8* 36.0 37.8 64.2* 45.1*   HCO3 ART mmol/L 28.6* 24.0 22.6 23.3 25.0 27.7 21.9*     Results from last 7 days   Lab Units 09/18/24  0709 09/17/24  2308 09/17/24  1638 09/17/24  1127 09/17/24  0516 09/16/24  2357   PROCALCITONIN ng/mL  --   --   --   --   --  68.40*   LACTATE mmol/L 2.0 2.7* 4.9* 5.2* 5.3* 6.3*         Glucose   Date/Time Value Ref Range Status   09/22/2024 0646 92 70 - 130 mg/dL Final   09/21/2024 2301 120 70 - 130 mg/dL Final   09/21/2024 1527 91 70 - 130 mg/dL Final   09/21/2024 1103 93 70 - 130 mg/dL Final   09/21/2024 0638 116 70 - 130 mg/dL Final   09/20/2024 2350 106 70 - 130 mg/dL Final   09/20/2024 1744 110 70 - 130 mg/dL Final         Imaging reviewed  Most recent chest x-ray 9/19 reviewed    Microbiology reviewed  MRSA and E. coli growing in respiratory culture from 9/17          Active Hospital Problems    Diagnosis  POA    **Sepsis [A41.9]  Yes    Septic shock [A41.9, R65.21]  Yes      Resolved Hospital Problems   No resolved problems to display.         ASSESSMENT:  Septic shock, present on admission  Bilateral pneumonia, MRSA  Acute hypoxemic and hypercapnic respiratory failure, requiring mechanical ventilation 9/16  Metabolic encephalopathy  Lactic acidosis, resolved  Elevated troponin  Severe protein calorie malnutrition/hypoalbuminemia (albumin 2)  Slightly elevated troponin, probably non-STEMI type II  Positive MRSA screen  Hypokalemia  Ankylosing spondylitis: On methotrexate as outpatient  Chronic indwelling Mariscal catheter  Dysphagia  with PEG tube  Failure to thrive  History of osteomyelitis  ESBL in urine culture 8/17/2024  Sacral pressure ulcer: Debrided 8/20/2024  Hypothyroidism  Chronic anemia      PLAN:  Adding flutter valve and incentive spirometer.  Encourage pulmonary toilet.  Weaning oxygen as able.   PT/ST/OT.  Awaiting bed outside of ICU.  Hospitalist service now following and will defer all nonpulmonary medical management to A.    Dez Thakkar MD  Pulmonary and Critical Care Medicine  [facility], [facility]  9/22/2024    09:37 EDT

## 2024-09-22 NOTE — SIGNIFICANT NOTE
"   09/22/24 0918   OTHER   Discipline physical therapist   Rehab Time/Intention   Session Not Performed other (see comments)  (PT eval order received for \"functional mobility below baseline\", pt is bedbound long term care NH resident, baseline level of mobility is dependent, no indication for acute skilled PT.)   Therapy Assessment/Plan (PT)   Criteria for Skilled Interventions Met (PT) does not meet criteria for skilled intervention;no       "

## 2024-09-22 NOTE — CONSULTS
"Referring Provider: NOHEMI Henderson    Reason for Consultation: sepsis; antbiotic recommendations     History of present illness:  Anthony Gallegos is a 79 y.o. with ankylosing spondylitis on methotrexate, chronic Mariscal, sacral and heel wounds with osteomyelitis, PEG tube dependency who I am asked to evaluate and give opinion for \"sepsis; antbiotic recommendations.\" History is obtained from the patient and review of the old medical records which I summarize/synthesize as follows: He has a past medical history of ankylosing spondylitis.  Our group has seen him in the past for an ESBL E. coli urinary tract infection, sacral decubitus wound, and left heel osteomyelitis.  He came to the hospital on 9/16/2024 (6 days ago) due to altered mental status he was found to be hypotensive at 59/36 and hypoxic to the 70% on room air.    Labs were notable for WBC 6, lactate 6, Pro-Carmelo 68, RPP negative, and lipase 8.  CXR showed bilateral lung opacities.  CT of the head did not show any acute process.  He was intubated and admitted to the intensive care unit.  He was started on vasopressors.  He was also started on vancomycin and meropenem.  His blood cultures are negative to date.  His respiratory culture showed MRSA and E. coli.    His current antibiotic regimen is vancomycin and ceftriaxone.  He has now been extubated and is on 5 L nasal cannula.    Past Medical History:   Diagnosis Date    Abscess of scrotum     MARTITA (acute kidney injury)     Altered mental state     Arthritis     AS (ankylosing spondylitis)     History of MRSA infection     Hypertension     Leukocytosis     Tetrahydrocannabinol (THC) use disorder, mild, abuse 12/20/2023    Uveitis        Past Surgical History:   Procedure Laterality Date    COLONOSCOPY      ENDOSCOPY W/ PEG TUBE PLACEMENT N/A 03/29/2024    Procedure: ESOPHAGOGASTRODUODENOSCOPY WITH PERCUTANEOUS ENDOSCOPIC GASTROSTOMY TUBE INSERTION;  Surgeon: Khadar Broderick MD;  Location: [facility]" ENDOSCOPY;  Service: General;  Laterality: N/A;  PRE/POST - DYSPHAGIA    ROTATOR CUFF REPAIR Right     TOTAL HIP ARTHROPLASTY Left 2014    UPPER GASTROINTESTINAL ENDOSCOPY      WOUND DEBRIDEMENT N/A 08/20/2024    Procedure: DEBRIDEMENT SACRAL ULCER/WOUND;  Surgeon: Justine Roque MD;  Location: [facility] OR;  Service: General;  Laterality: N/A;    WOUND DEBRIDEMENT Left 08/20/2024    Procedure: LEFT DEBRIDEMENT FOOT;  Surgeon: Renny Duval DPM;  Location: [facility] OR;  Service: Podiatry;  Laterality: Left;       Antibiotic allergies and intolerances:  None    Medications:    Current Facility-Administered Medications:     acetaminophen (TYLENOL) tablet 650 mg, 650 mg, Per PEG Tube, Q4H PRN, 650 mg at 09/22/24 0303 **OR** acetaminophen (TYLENOL) suppository 650 mg, 650 mg, Rectal, Q4H PRN, Dez Thakkar MD    atorvastatin (LIPITOR) tablet 40 mg, 40 mg, Per PEG Tube, Daily, Dez Thakkar MD, 40 mg at 09/22/24 0828    sennosides-docusate (PERICOLACE) 8.6-50 MG per tablet 2 tablet, 2 tablet, Per PEG Tube, BID, 2 tablet at 09/22/24 0825 **AND** polyethylene glycol (MIRALAX) packet 17 g, 17 g, Per PEG Tube, Daily PRN **AND** bisacodyl (DULCOLAX) EC tablet 5 mg, 5 mg, Oral, Daily PRN **AND** bisacodyl (DULCOLAX) suppository 10 mg, 10 mg, Rectal, Daily PRN, Dez Thakkar MD    Calcium Replacement - Follow Nurse / BPA Driven Protocol, , Does not apply, PRN, Dez Thakkar MD    cefTRIAXone (ROCEPHIN) 2,000 mg in sodium chloride 0.9 % 100 mL MBP, 2,000 mg, Intravenous, Q24H, Dez Thakkar MD, Last Rate: 200 mL/hr at 09/21/24 1215, 2,000 mg at 09/21/24 1215    chlorhexidine (PERIDEX) 0.12 % solution 15 mL, 15 mL, Mouth/Throat, Q12H, Dez Thakkar MD, 15 mL at 09/22/24 0832    dexmedetomidine (PRECEDEX) 400 mcg in 100 mL NS infusion, 0.2-1.5 mcg/kg/hr, Intravenous, Titrated, Dez Thakkar MD, Stopped at 09/21/24 0252    dextrose (D50W) (25 g/50 mL) IV injection 25 g, 25  g, Intravenous, Q15 Min PRN, Dez Thakkar MD, 25 g at 09/19/24 0637    dextrose (GLUTOSE) oral gel 15 g, 15 g, Oral, Q15 Min PRN, Dez Thakkar MD    Enoxaparin Sodium (LOVENOX) syringe 40 mg, 40 mg, Subcutaneous, Q24H, Dez Thakkar MD, 40 mg at 09/21/24 1612    glucagon (GLUCAGEN) injection 1 mg, 1 mg, Subcutaneous, Q15 Min PRN, Dez Thakkar MD    guaifenesin (ROBITUSSIN) 100 MG/5ML liquid 200 mg, 200 mg, Per PEG Tube, Q6H, Dez Thakkar MD, 200 mg at 09/22/24 0824    ipratropium-albuterol (DUO-NEB) nebulizer solution 3 mL, 3 mL, Nebulization, 4x Daily - RT, Dez Thakkar MD, 3 mL at 09/21/24 1943    levothyroxine (SYNTHROID, LEVOTHROID) tablet 25 mcg, 25 mcg, Per PEG Tube, Q AM, Dez Thakkar MD, 25 mcg at 09/22/24 0533    Magnesium Standard Dose Replacement - Follow Nurse / BPA Driven Protocol, , Does not apply, PRN, Dez Thakkar MD    nitroglycerin (NITROSTAT) SL tablet 0.4 mg, 0.4 mg, Sublingual, Q5 Min PRN, Dez Thakkar MD    norepinephrine (LEVOPHED) 8 mg in 250 mL NS infusion (premix), 0.02-0.3 mcg/kg/min, Intravenous, Titrated, Dez Thakkar MD, Stopped at 09/21/24 0716    pantoprazole (PROTONIX) injection 40 mg, 40 mg, Intravenous, Q24H, Dez Thakkar MD, 40 mg at 09/22/24 0824    Pharmacy to dose vancomycin, , Does not apply, Continuous PRN, Dez Thakkar MD    Phosphorus Replacement - Follow Nurse / BPA Driven Protocol, , Does not apply, PRN, Dez Thakkar MD    Potassium Replacement - Follow Nurse / BPA Driven Protocol, , Does not apply, PRNBijan Mark Edwin, MD    sodium chloride 0.9 % infusion, 50 mL/hr, Intravenous, Continuous, Dez Thakkar MD, Last Rate: 50 mL/hr at 09/21/24 2100, 50 mL/hr at 09/21/24 2100    sodium hypochlorite (DAKIN'S 1/4 STRENGTH) 0.125 % topical solution 0.125% solution, , Topical, Q12H, Dez Thakkar MD, Given at 09/22/24 0833    vancomycin (VANCOCIN) 1,000 mg in  sodium chloride 0.9 % 250 mL IVPB-VTB, 1,000 mg, Intravenous, Q12H, Dez Thakkar MD, Last Rate: 250 mL/hr at 09/22/24 1038, 1,000 mg at 09/22/24 1038    vasopressin (PITRESSIN) 20 units in 100 mL NS infusion, 0.03 Units/min, Intravenous, Continuous, Dez Thakkar MD, Stopped at 09/18/24 1200      Objective   Vital Signs   Temp:  [98 °F (36.7 °C)-98.5 °F (36.9 °C)] 98 °F (36.7 °C)  Heart Rate:  [110-123] 110  Resp:  [24-44] 36  BP: (102-156)/(56-82) 156/73    Physical Exam:   General: awake, acute and chronically ill-appearing, attempts to answer a few questions but appears very weak in his speech  Eyes: no scleral icterus  Cardiovascular: Tachycardic  Respiratory: Tachypneic on 5 L nasal cannula with left-sided rales greater than right sided rales  GI: Abdomen with PEG tube present  : + Mariscal catheter  MSK/skin: Left foot is bandaged; open wounds on both scapulae, right hip;and sacrum  Vasc: PIV w/o erythema    Labs:     Lab Results   Component Value Date    WBC 12.56 (H) 09/20/2024    HGB 8.5 (L) 09/20/2024    HCT 26.0 (L) 09/20/2024    MCV 82.8 09/20/2024     09/20/2024       Lab Results   Component Value Date    GLUCOSE 126 (H) 09/20/2024    BUN 11 09/20/2024    CREATININE 0.42 (L) 09/22/2024    EGFRIFNONA 69 03/27/2018    EGFRIFAFRI 84 03/27/2018    BCR 28.9 (H) 09/20/2024    CO2 24.1 09/20/2024    CALCIUM 7.7 (L) 09/20/2024    PROTENTOTREF 8.2 08/12/2024    ALBUMIN 2.3 (L) 09/16/2024    LABIL2 0.4 (L) 08/12/2024    AST 27 09/16/2024    ALT 24 09/16/2024     Lab Results   Component Value Date    VANCOTROUGH 12.70 09/19/2024    [facility] 5.30 09/18/2024     Lab Results   Component Value Date    HGBA1C 5.80 (H) 01/23/2024       Procal 68    Microbiology:  9/16 BCx: Negative  9/17 UCX: Negative  9/17 RPP: Negative  9/17 respiratory culture: Moderate growth MRSA and moderate growth E. Coli  9/17 Strep and Legionella urine antigens: Negative  9/17 MRSA nares PCR: +    Radiology:  9/19 CXR  with moderate to extensive bilateral pulmonary opacities present; ETT in place    9/17 CT head with no acute abnormality but shows small vessel ischemic degenerative changes and atrophy    ASSESSMENT/PLAN:  Septic shock present on admission -better  MRSA pneumonia  E. coli pneumonia  Acute hypoxic respiratory failure  Chronic Mariscal catheter  History of ESBL E. coli UTI  Ankylosing spondylitis on methotrexate  Dysphagia with PEG tube  History of osteomyelitis of the calcaneus and sacrum  Elevated procalcitonin    For MRSA and E. coli pneumonia, I recommend that he continue treatment with vancomycin and ceftriaxone through 9/24/2025.  His procalcitonin was 68 at admission, and I will repeat one today to make sure it is downtrending.    While the patient is on vancomycin, vancomycin levels will be ordered and reviewed with dose adjustments made as needed. The patient will have a daily creatinine level checked while on vancomycin as part of monitoring this medication.     Continue local wound care to scapula, hip, sacrum and heel.     Long-term prognosis appears very poor.    I will check on him again tomorrow.

## 2024-09-22 NOTE — SIGNIFICANT NOTE
09/22/24 0843   OTHER   Discipline occupational therapist   Rehab Time/Intention   Session Not Performed other (see comments)  (Spoke with [facility] staff who reported that pt is bedbound and total A for care in LTC at baseline. No acute OT needs at this time, recommend return to LTC as medically appropriate.)   Therapy Assessment/Plan (PT)   Criteria for Skilled Interventions Met (PT) no;does not meet criteria for skilled intervention

## 2024-09-22 NOTE — NURSING NOTE
Pt alert and oriented times 2, pt on 2L Nasal canula at beginning of shift and with small amounts of oral secretions. As shift progressed pt noted to have a moderate amount of thick secretions, frequent oral care provided and pulmonary toileting encouraged. Pt suctioned and assisted frequently with secretion clearance. O2 increased to 4L Nasal canula

## 2024-09-23 PROBLEM — E87.0 HYPERNATREMIA: Status: ACTIVE | Noted: 2024-01-01

## 2024-09-23 PROBLEM — E83.39 HYPOPHOSPHATEMIA: Status: ACTIVE | Noted: 2024-01-01

## 2024-09-23 NOTE — CASE MANAGEMENT/SOCIAL WORK
Continued Stay Note  [facility]     Patient Name: Anthony Gallegos  MRN: [id number]  Today's Date: 9/23/2024    Admit Date: 9/16/2024    Plan: Return to [facility]. Will need ems transport   Discharge Plan       Row Name 09/23/24 1426       Plan    Plan Return to [facility]. Will need ems transport    Plan Comments Patient is LTC at [facility] and can return per facility and family                      Expected Discharge Date and Time       Expected Discharge Date Expected Discharge Time    Sep 30, 2024               Gabriella Bunn RN

## 2024-09-23 NOTE — PROGRESS NOTES
[facility] INPATIENT PROGRESS NOTE         [facility] CORONARY CARE    2024      PATIENT IDENTIFICATION:  Name: Anthony Gallegos ADMIT: 2024   : 1945  PCP: Keshav Eason MD    MRN: [id number] LOS: 6 days   AGE/SEX: 79 y.o. male  ROOM: [facility]                     LOS 6    Reason for visit: Respiratory failure with sepsis      SUBJECTIVE:      Resting comfortably on BiPAP at time of visit.  Saturation is 95% on 30% FiO2 through BiPAP circuit.  Improved air movement on auscultation bilaterally.      Objective   OBJECTIVE:    Vital Sign Min/Max for last 24 hours  Temp  Min: 97.5 °F (36.4 °C)  Max: 99.7 °F (37.6 °C)   BP  Min: 127/65  Max: 162/77   Pulse  Min: 109  Max: 128   Resp  Min: 22  Max: 42   SpO2  Min: 90 %  Max: 95 %   No data recorded   Weight  Min: 85 kg (187 lb 6.3 oz)  Max: 85 kg (187 lb 6.3 oz)    Vitals:    24 0318 24 0337 24 0700 24 0719   BP:  127/65     BP Location:  Right arm     Patient Position:  Lying     Pulse: 116   113   Resp: (!) 33 (!) 33  (!) 33   Temp:    97.5 °F (36.4 °C)   TempSrc:    Axillary   SpO2: 93%   95%   Weight:   85 kg (187 lb 6.3 oz)             24  0600 24  0600 24  0700   Weight: 74.8 kg (164 lb 14.5 oz) 82.6 kg (182 lb 1.6 oz) 85 kg (187 lb 6.3 oz)       Body mass index is 23.42 kg/m².                    FiO2 (%): 26 %     Body mass index is 23.42 kg/m².    Intake/Output Summary (Last 24 hours) at 2024 0906  Last data filed at 2024 0600  Gross per 24 hour   Intake 1600 ml   Output 2250 ml   Net -650 ml         Exam:  GEN:  No distress, appears stated age.  Very weak appearing  EYES:   PERRL, anicteric sclerae  ENT:    External ears/nose normal, OP clear  NECK:  No adenopathy, midline trachea  LUNGS: Normal chest on inspection, palpation and scattered coarse breath sounds at bases on auscultation  CV:  Normal S1S2, without murmur  ABD:  Nontender, nondistended, no hepatosplenomegaly,  +BS  EXT:  No edema.  No cyanosis or clubbing.  No mottling and normal cap refill.    Assessment     Scheduled meds:  atorvastatin, 40 mg, Per PEG Tube, Daily  cefTRIAXone, 2,000 mg, Intravenous, Q24H  chlorhexidine, 15 mL, Mouth/Throat, Q12H  enoxaparin, 40 mg, Subcutaneous, Q24H  guaifenesin, 200 mg, Per PEG Tube, Q6H  ipratropium-albuterol, 3 mL, Nebulization, 4x Daily - RT  levothyroxine, 25 mcg, Per PEG Tube, Q AM  pantoprazole, 40 mg, Intravenous, Q24H  senna-docusate sodium, 2 tablet, Per PEG Tube, BID  sodium hypochlorite, , Topical, Q12H  vancomycin, 1,000 mg, Intravenous, Q12H      IV meds:                      dexmedetomidine, 0.2-1.5 mcg/kg/hr, Last Rate: Stopped (09/21/24 0252)  norepinephrine, 0.02-0.3 mcg/kg/min, Last Rate: Stopped (09/21/24 0716)  Pharmacy to dose vancomycin,   sodium chloride, 50 mL/hr, Last Rate: 50 mL/hr (09/22/24 1800)  vasopressin, 0.03 Units/min, Last Rate: Stopped (09/18/24 1200)      Data Review:  Results from last 7 days   Lab Units 09/22/24  2142 09/22/24  0814 09/21/24  1902 09/21/24  0710 09/20/24  1122 09/20/24  0728 09/19/24  2212 09/19/24  0446 09/18/24  0708 09/17/24  0516 09/16/24  2357   SODIUM mmol/L  --   --   --   --   --  147*  --  145 140 132* 137   POTASSIUM mmol/L 4.0 3.6 3.3* 3.4* 3.0* 2.7*   < > 2.3* 3.1* 5.3* 4.8   CHLORIDE mmol/L  --   --   --   --   --  117*  --  114* 109* 104 101   CO2 mmol/L  --   --   --   --   --  24.1  --  24.3 21.9* 17.0* 23.0   BUN mg/dL  --   --   --   --   --  11  --  10 17 23 24*   CREATININE mg/dL  --  0.42*  --  0.36*  --  0.38*  --  0.41* 0.51* 0.84 1.06   GLUCOSE mg/dL  --   --   --   --   --  126*  --  92 63* 84 87   CALCIUM mg/dL  --   --   --   --   --  7.7*  --  7.3* 7.7* 8.3* 9.0    < > = values in this interval not displayed.         Estimated Creatinine Clearance: 168.7 mL/min (A) (by C-G formula based on SCr of 0.42 mg/dL (L)).  Results from last 7 days   Lab Units 09/20/24  0728 09/19/24  0446 09/18/24  0709  09/17/24  1127 09/16/24  2357   WBC 10*3/mm3 12.56* 15.38* 13.50* 14.72* 6.51   HEMOGLOBIN g/dL 8.5* 8.2* 8.3* 9.3* 9.7*   PLATELETS 10*3/mm3 241 252 248 267 345         Results from last 7 days   Lab Units 09/16/24  2357   ALT (SGPT) U/L 24   AST (SGOT) U/L 27     Results from last 7 days   Lab Units 09/21/24  1949 09/20/24  0921 09/19/24  0341 09/18/24  0357 09/17/24  0641 09/17/24  0339 09/17/24  0009   PH, ARTERIAL pH units 7.511* 7.475* 7.503* 7.420 7.428 7.243* 7.295*   PO2 ART mm Hg 148.3* 66.3* 67.0* 109.1* 85.5 228.6* 68.0*   PCO2, ARTERIAL mm Hg 35.7 32.6* 28.8* 36.0 37.8 64.2* 45.1*   HCO3 ART mmol/L 28.6* 24.0 22.6 23.3 25.0 27.7 21.9*     Results from last 7 days   Lab Units 09/22/24  0814 09/18/24  0709 09/17/24  2308 09/17/24  1638 09/17/24  1127 09/17/24  0516 09/16/24  2357   PROCALCITONIN ng/mL 6.77*  --   --   --   --   --  68.40*   LACTATE mmol/L  --  2.0 2.7* 4.9* 5.2* 5.3* 6.3*         Glucose   Date/Time Value Ref Range Status   09/23/2024 0640 120 70 - 130 mg/dL Final   09/22/2024 2317 102 70 - 130 mg/dL Final   09/22/2024 2004 96 70 - 130 mg/dL Final   09/22/2024 1819 75 70 - 130 mg/dL Final   09/22/2024 1424 142 (H) 70 - 130 mg/dL Final   09/22/2024 1359 61 (L) 70 - 130 mg/dL Final   09/22/2024 0646 92 70 - 130 mg/dL Final         Imaging reviewed  Most recent chest x-ray 9/19 reviewed    Microbiology reviewed  MRSA and E. coli growing in respiratory culture from 9/17          Active Hospital Problems    Diagnosis  POA    **Pneumonia of both lungs due to methicillin resistant Staphylococcus aureus (MRSA) [J15.212]  Yes    Pneumonia, aspiration [J69.0]  Yes    Acute respiratory failure with hypoxia and hypercapnia [J96.01, J96.02]  Yes    NSTEMI (non-ST elevated myocardial infarction) [I21.4]  Yes    Subacute osteomyelitis of left foot [M86.272]  Yes    Hypokalemia [E87.6]  No    Septic shock [A41.9, R65.21]  Yes    Hypothyroidism (acquired) [E03.9]  Yes    Sacral wound [S31.000A]  Yes     Hyperlipidemia [E78.5]  Yes    Feeding by G-tube [Z93.1]  Not Applicable    Indwelling Mariscal catheter present [Z97.8]  Not Applicable    Anemia [D64.9]  Yes    Failure to thrive in adult [R62.7]  Yes    Dysphagia [R13.10]  Yes    GERD without esophagitis [K21.9]  Yes    DISH (disseminated idiopathic skeletal hyperostosis) [M48.10]  Yes    Immobility [Z74.09]  Yes    Essential hypertension [I10]  Yes    Ankylosing spondylitis [M45.9]  Yes      Resolved Hospital Problems   No resolved problems to display.         ASSESSMENT:  Septic shock, present on admission  Bilateral pneumonia, MRSA  Acute hypoxemic and hypercapnic respiratory failure, requiring mechanical ventilation 9/16  Metabolic encephalopathy  Lactic acidosis, resolved  Elevated troponin  Severe protein calorie malnutrition/hypoalbuminemia (albumin 2)  Slightly elevated troponin, probably non-STEMI type II  Positive MRSA screen  Hypokalemia  Ankylosing spondylitis: On methotrexate as outpatient  Chronic indwelling Mariscal catheter  Dysphagia with PEG tube  Failure to thrive  History of osteomyelitis  ESBL in urine culture 8/17/2024  Sacral pressure ulcer: Debrided 8/20/2024  Hypothyroidism  Chronic anemia      PLAN:  Encouraged flutter valve and incentive spirometer to help with pulmonary toilet.  Weaning oxygen as able.   PT/ST/OT.  Telemetry bed overflow and defer all nonpulmonary medical management to hospitalist service.        Dez Thakkar MD  Pulmonary and Critical Care Medicine  [facility]  9/23/2024    09:06 EDT

## 2024-09-23 NOTE — PROGRESS NOTES
Name: Anthony Gallegos ADMIT: 2024   : 1945  PCP: Keshav Eason MD    MRN: [id number] LOS: 6 days   AGE/SEX: 79 y.o. male  ROOM: [facility]     Subjective   Subjective   Pt still unable to answer any questions this AM. Speech unintelligible. Less upper airway congestion.       Objective   Objective   Vital Signs  Temp:  [97.5 °F (36.4 °C)-99.7 °F (37.6 °C)] 97.5 °F (36.4 °C)  Heart Rate:  [109-128] 113  Resp:  [22-42] 33  BP: (127-162)/(65-82) 127/65  SpO2:  [90 %-95 %] 95 %  on  Flow (L/min):  [5] 5;   Device (Oxygen Therapy): NPPV/NIV  Body mass index is 23.42 kg/m².  Physical Exam  Vitals and nursing note reviewed.   Constitutional:       General: He is not in acute distress.     Appearance: He is ill-appearing. He is not toxic-appearing or diaphoretic.   HENT:      Head: Normocephalic.      Nose: Nose normal.      Mouth/Throat:      Mouth: Mucous membranes are dry.      Pharynx: Oropharynx is clear.   Eyes:      General: No scleral icterus.        Right eye: No discharge.         Left eye: No discharge.      Conjunctiva/sclera: Conjunctivae normal.   Cardiovascular:      Rate and Rhythm: Normal rate and regular rhythm.      Pulses: Normal pulses.   Pulmonary:      Effort: Pulmonary effort is normal. No respiratory distress.      Breath sounds: Normal breath sounds. No wheezing or rales.      Comments: Anteriorly   Abdominal:      General: Bowel sounds are normal. There is no distension.      Palpations: Abdomen is soft.      Tenderness: There is no abdominal tenderness.      Comments: PEG looks fine   Genitourinary:     Comments: Yellow urine in forde bag  Musculoskeletal:         General: Swelling (trace in all extremities) present.      Cervical back: Neck supple.      Comments: Heel boots in place  SCDs in place  Dressing to left heel   Skin:     General: Skin is warm and dry.      Capillary Refill: Capillary refill takes less than 2 seconds.      Coloration: Skin is not jaundiced.    Neurological:      Mental Status: He is alert.      Cranial Nerves: No cranial nerve deficit.      Motor: No weakness.   Psychiatric:      Comments: Unable to assess       Results Review     I reviewed the patient's new clinical results.  Results from last 7 days   Lab Units 09/20/24 0728 09/19/24 0446 09/18/24  0709 09/17/24  1127   WBC 10*3/mm3 12.56* 15.38* 13.50* 14.72*   HEMOGLOBIN g/dL 8.5* 8.2* 8.3* 9.3*   PLATELETS 10*3/mm3 241 252 248 267     Results from last 7 days   Lab Units 09/22/24  2142 09/22/24  0814 09/21/24  1902 09/21/24  0710 09/20/24  1122 09/20/24 0728 09/19/24 2212 09/19/24 0446 09/18/24  0708 09/17/24  0516   SODIUM mmol/L  --   --   --   --   --  147*  --  145 140 132*   POTASSIUM mmol/L 4.0 3.6 3.3* 3.4*   < > 2.7*   < > 2.3* 3.1* 5.3*   CHLORIDE mmol/L  --   --   --   --   --  117*  --  114* 109* 104   CO2 mmol/L  --   --   --   --   --  24.1  --  24.3 21.9* 17.0*   BUN mg/dL  --   --   --   --   --  11  --  10 17 23   CREATININE mg/dL  --  0.42*  --  0.36*  --  0.38*  --  0.41* 0.51* 0.84   GLUCOSE mg/dL  --   --   --   --   --  126*  --  92 63* 84   EGFR mL/min/1.73  --  108.7  --  113.9  --  112.0  --  109.5 102.5 88.2    < > = values in this interval not displayed.     Results from last 7 days   Lab Units 09/16/24  2357   ALBUMIN g/dL 2.3*   BILIRUBIN mg/dL 0.4   ALK PHOS U/L 70   AST (SGOT) U/L 27   ALT (SGPT) U/L 24     Results from last 7 days   Lab Units 09/21/24 1902 09/20/24  0728 09/19/24  0446 09/18/24  0708 09/17/24  0516 09/16/24  2357   CALCIUM mg/dL  --  7.7* 7.3* 7.7* 8.3* 9.0   ALBUMIN g/dL  --   --   --   --   --  2.3*   MAGNESIUM mg/dL 1.8  --  1.6  --   --   --      Results from last 7 days   Lab Units 09/22/24  0814 09/18/24  0709 09/17/24  2308 09/17/24  1638 09/17/24  1127 09/17/24  0516 09/16/24  2357   PROCALCITONIN ng/mL 6.77*  --   --   --   --   --  68.40*   LACTATE mmol/L  --  2.0 2.7* 4.9* 5.2*   < > 6.3*    < > = values in this interval not  displayed.     Glucose   Date/Time Value Ref Range Status   09/23/2024 0640 120 70 - 130 mg/dL Final   09/22/2024 2317 102 70 - 130 mg/dL Final   09/22/2024 2004 96 70 - 130 mg/dL Final   09/22/2024 1819 75 70 - 130 mg/dL Final   09/22/2024 1424 142 (H) 70 - 130 mg/dL Final   09/22/2024 1359 61 (L) 70 - 130 mg/dL Final   09/22/2024 0646 92 70 - 130 mg/dL Final       No radiology results for the last day    I have personally reviewed all medications:  Scheduled Medications  atorvastatin, 40 mg, Per PEG Tube, Daily  cefTRIAXone, 2,000 mg, Intravenous, Q24H  chlorhexidine, 15 mL, Mouth/Throat, Q12H  enoxaparin, 40 mg, Subcutaneous, Q24H  guaifenesin, 200 mg, Per PEG Tube, Q6H  ipratropium-albuterol, 3 mL, Nebulization, 4x Daily - RT  levothyroxine, 25 mcg, Per PEG Tube, Q AM  pantoprazole, 40 mg, Intravenous, Q24H  senna-docusate sodium, 2 tablet, Per PEG Tube, BID  sodium hypochlorite, , Topical, Q12H  vancomycin, 1,000 mg, Intravenous, Q12H    Infusions  dexmedetomidine, 0.2-1.5 mcg/kg/hr, Last Rate: Stopped (09/21/24 0252)  norepinephrine, 0.02-0.3 mcg/kg/min, Last Rate: Stopped (09/21/24 0716)  Pharmacy to dose vancomycin,   sodium chloride, 50 mL/hr, Last Rate: 50 mL/hr (09/23/24 0954)  vasopressin, 0.03 Units/min, Last Rate: Stopped (09/18/24 1200)    Diet  NPO Diet NPO Type: Tube Feeding    I have personally reviewed:  [x]  Laboratory   []  Microbiology   []  Radiology   [x]  EKG/Telemetry  []  Cardiology/Vascular   []  Pathology    []  Records       Assessment/Plan     Active Hospital Problems    Diagnosis  POA    **Pneumonia of both lungs due to methicillin resistant Staphylococcus aureus (MRSA) [J15.212]  Yes    Pneumonia, aspiration [J69.0]  Yes    Acute respiratory failure with hypoxia and hypercapnia [J96.01, J96.02]  Yes    NSTEMI (non-ST elevated myocardial infarction) [I21.4]  Yes    Subacute osteomyelitis of left foot [M86.272]  Yes    Hypokalemia [E87.6]  No    Septic shock [A41.9, R65.21]  Yes     Hypothyroidism (acquired) [E03.9]  Yes    Sacral wound [S31.000A]  Yes    Hyperlipidemia [E78.5]  Yes    Feeding by G-tube [Z93.1]  Not Applicable    Indwelling Frode catheter present [Z97.8]  Not Applicable    Anemia [D64.9]  Yes    Failure to thrive in adult [R62.7]  Yes    Dysphagia [R13.10]  Yes    GERD without esophagitis [K21.9]  Yes    DISH (disseminated idiopathic skeletal hyperostosis) [M48.10]  Yes    Immobility [Z74.09]  Yes    Essential hypertension [I10]  Yes    Ankylosing spondylitis [M45.9]  Yes      Resolved Hospital Problems   No resolved problems to display.       80yo gentleman with ankylosing spondylitis, HTN, BPH, chronic forde, h/o ESBL E.coli UTI, GERD, HLD, [place], ACD, hypoT4, O/P dysphagia chronically on tube feeds, and recent admission with multiple pressure wounds and left calcaneal osteomyelitis, who presented to ER from SNF with altered mental status, hypotension, and acute hypoxic resp failure. He was intubated in ER and admitted to ICU with severe sepsis due to aspiration PNA. We were asked to assume care when pt transitioned out of ICU.     Septic shock  Bilateral aspiration PNA due to MRSA and E.coli  Acute hypoxic and hypercapnic resp failure  Mgmt per Pulm and ID  S/p pressors  Extubated 9/20  PCT down to 6.8 from 68 on admission  ID recommends continue Rocephin and Vanc through 9/24  Continue O2 and pulm hygiene efforts  Blood cultures NGTD, urinary Ag's neg, RVP neg  Resp culture grew MRSA and E.coli  No fever or hypotension currently, intermittent sinus tach noted    Type 2 NSTEMI  No ACS    Multiple pressure wounds  Left calcaneal osteomyelitis  WOCN following, d/w them this AM and they note all wounds appear worse compared to last admission, they do not feel any require debridement at this time but will continue to follow  Palliative wound care to left calcaneal wound/osteo per plan last admission from ID and Pod    Hypokalemia  Replaced  Checking Mg++  level    Immobility  Chronic forde  Dysphagia, PEG tube  Protein calorie malnutrition, severe  Continue PT  Good UOP  Continue tube feeds, Nutrition following  SLP recommends repeat VFSS when pt able to comply  Previous admission had been on modified diet but did not like to eat (only drank fluids) so his nutrition is primarily via tube feeds--suspect he will have to remain strict NPO from now on    Dysarthria  Speech is really unintelligible and that is new for him since last admissoin  Will ask Neuro to see    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  Full code.  Discussed with patient and RN. No family present.  Anticipate discharge back to SNU facility, timing yet to be determined.  Expected Discharge Date: 9/30/2024; Expected Discharge Time:       Walter Mancia MD  [facility]  09/23/24  10:17 EDT

## 2024-09-23 NOTE — PROGRESS NOTES
Nutrition Services    Patient Name:  Anthony Gallegos  YOB: 1945  MRN: [id number]  Admit Date:  9/16/2024  Assessment Date:  09/23/24    Summary: Nutrition follow up  Pt laying in bed when I visited, unable to provide a hx. TF currently running at goal and pt tolerating well per RN.   Na 154, Phos 0.3    Plan/Recommendations  Please discontinue Nutren 2.0 and initiate Impact Peptide 1.5 at 50 ml/hr and advance 10 ml q 4 hrs to goal rate of 75 ml/hr. Goal rate to provide 2475 kcals, 155 g/protein, and 1356 ml free water. Please flush 50 ml free water q 1 hr.     The above end goal rate is for 22 hrs/day to assume interruptions for ADLs. Water flushes adjusted based on clinical picture + Rx flushes/IV fluids     RD to follow    CLINICAL NUTRITION ASSESSMENT      Reason for Assessment Follow-up Protocol     Diagnosis/Problem    altered mental status,Intubated, hypotensive, sepsis, pneumonia  Extubated 9/20   Medical/Surgical History Past Medical History:   Diagnosis Date    Abscess of scrotum     MARTITA (acute kidney injury)     Altered mental state     Arthritis     AS (ankylosing spondylitis)     History of MRSA infection     Hypertension     Leukocytosis     Tetrahydrocannabinol (THC) use disorder, mild, abuse 12/20/2023    Uveitis        Past Surgical History:   Procedure Laterality Date    COLONOSCOPY      ENDOSCOPY W/ PEG TUBE PLACEMENT N/A 03/29/2024    Procedure: ESOPHAGOGASTRODUODENOSCOPY WITH PERCUTANEOUS ENDOSCOPIC GASTROSTOMY TUBE INSERTION;  Surgeon: Khadar Broderick MD;  Location: [facility] ENDOSCOPY;  Service: General;  Laterality: N/A;  PRE/POST - DYSPHAGIA    ROTATOR CUFF REPAIR Right     TOTAL HIP ARTHROPLASTY Left 2014    UPPER GASTROINTESTINAL ENDOSCOPY      WOUND DEBRIDEMENT N/A 08/20/2024    Procedure: DEBRIDEMENT SACRAL ULCER/WOUND;  Surgeon: Justine Roque MD;  Location: [facility] MAIN OR;  Service: General;  Laterality: N/A;    WOUND DEBRIDEMENT Left 08/20/2024    Procedure:  LEFT DEBRIDEMENT FOOT;  Surgeon: Renny Duval DPM;  Location: [facility] OR;  Service: Podiatry;  Laterality: Left;        Anthropometrics        Current Height  Current Weight  BMI kg/m2    Weight: 85 kg (187 lb 6.3 oz) (09/23/24 0700)  Body mass index is 23.42 kg/m².   Adjusted BMI (if applicable)    BMI Category Normal/Healthy (18.4 - 24.9)   Ideal Body Weight (IBW) 186lb   Usual Body Weight (UBW) 140-160lb   Weight Trend Loss, Gain   Weight History Wt Readings from Last 30 Encounters:   09/23/24 0700 85 kg (187 lb 6.3 oz)   09/21/24 0600 82.6 kg (182 lb 1.6 oz)   09/20/24 0600 74.8 kg (164 lb 14.5 oz)   09/19/24 0558 72.8 kg (160 lb 7.9 oz)   09/18/24 0449 73.8 kg (162 lb 11.2 oz)   09/16/24 2100 68 kg (149 lb 14.6 oz)   09/11/24 0939 67.1 kg (148 lb)   08/30/24 1027 67.1 kg (148 lb)   08/28/24 0209 72.5 kg (159 lb 13.3 oz)   08/27/24 0500 72.2 kg (159 lb 2.8 oz)   08/26/24 0446 71.1 kg (156 lb 12 oz)   08/25/24 0313 74.5 kg (164 lb 3.9 oz)   08/24/24 1506 71.7 kg (158 lb)   08/24/24 0232 71.8 kg (158 lb 4.6 oz)   08/23/24 0604 71.9 kg (158 lb 8.2 oz)   08/22/24 0624 69.9 kg (154 lb 1.6 oz)   08/21/24 0518 73.8 kg (162 lb 11.2 oz)   08/20/24 0503 72.4 kg (159 lb 9.8 oz)   08/19/24 0618 70.4 kg (155 lb 3.3 oz)   08/17/24 0730 63.6 kg (140 lb 3.4 oz)   08/08/24 1044 63.6 kg (140 lb 4.8 oz)   06/27/24 0300 71 kg (156 lb 8.4 oz)   06/26/24 0500 71 kg (156 lb 8.4 oz)   06/25/24 0545 71 kg (156 lb 8.4 oz)   06/23/24 0600 60.4 kg (133 lb 2.5 oz)   06/22/24 0556 61.8 kg (136 lb 3.9 oz)   06/21/24 0500 62 kg (136 lb 11 oz)   06/20/24 0419 65.4 kg (144 lb 2.9 oz)   06/17/24 2351 63.1 kg (139 lb 1.6 oz)   04/09/24 0516 90.4 kg (199 lb 4.7 oz)   04/08/24 0420 90.5 kg (199 lb 8.3 oz)   04/07/24 0600 81 kg (178 lb 9.2 oz)   04/06/24 0500 80 kg (176 lb 5.9 oz)   04/03/24 0521 81.4 kg (179 lb 7.3 oz)   04/02/24 0435 80.4 kg (177 lb 4 oz)   04/01/24 0420 80.9 kg (178 lb 5.6 oz)   03/31/24 0530 82.7 kg (182 lb 6.4 oz)    03/30/24 0557 82.2 kg (181 lb 3.5 oz)   03/29/24 0548 82.6 kg (182 lb 1.6 oz)   03/28/24 0500 76.9 kg (169 lb 8 oz)   03/27/24 0617 76.4 kg (168 lb 6.4 oz)   03/25/24 2123 69.1 kg (152 lb 6.4 oz)   03/25/24 1516 79 kg (174 lb 2.6 oz)   01/31/24 0639 79 kg (174 lb 3.2 oz)   01/29/24 0509 78.8 kg (173 lb 11.6 oz)   01/28/24 0530 77.7 kg (171 lb 4.8 oz)   01/25/24 0537 76.4 kg (168 lb 6.9 oz)   01/23/24 0542 73.2 kg (161 lb 6 oz)   01/22/24 0502 75.1 kg (165 lb 9.1 oz)   01/21/24 2304 75.5 kg (166 lb 7.2 oz)   01/21/24 1744 81.6 kg (180 lb)   01/08/24 0253 81.6 kg (179 lb 14.3 oz)   01/05/24 0440 87.9 kg (193 lb 12.6 oz)   01/04/24 0439 85.6 kg (188 lb 11.4 oz)   01/03/24 0513 82.5 kg (181 lb 14.1 oz)   12/31/23 1300 80.5 kg (177 lb 7.5 oz)   12/31/23 0350 83.8 kg (184 lb 11.9 oz)   12/30/23 0511 85.3 kg (188 lb 0.8 oz)   12/29/23 0341 85.7 kg (188 lb 15 oz)   12/28/23 0435 85.4 kg (188 lb 4.4 oz)   12/27/23 0755 81 kg (178 lb 9.2 oz)   12/27/23 0423 85.4 kg (188 lb 4.4 oz)   12/25/23 0707 85.2 kg (187 lb 13.3 oz)   12/23/23 0608 81.3 kg (179 lb 3.7 oz)   12/22/23 0430 81.8 kg (180 lb 5.4 oz)   12/20/23 1005 81.9 kg (180 lb 8 oz)   03/27/18 1439 100 kg (221 lb)   03/22/18 1613 101 kg (222 lb 12.8 oz)   01/23/18 1414 101 kg (223 lb)   12/26/17 1111 99.8 kg (220 lb)   10/19/17 1239 95.3 kg (210 lb)   08/30/17 1459 91.3 kg (201 lb 3.2 oz)   08/29/17 1221 93 kg (205 lb)   08/10/17 1517 88 kg (194 lb)   07/14/17 1041 85.9 kg (189 lb 6.4 oz)   06/29/17 1318 84.5 kg (186 lb 3.2 oz)   06/21/17 1922 90.7 kg (200 lb)      --  Labs       Pertinent Labs    Results from last 7 days   Lab Units 09/23/24  0948 09/22/24  2142 09/22/24  0814 09/21/24  1902 09/21/24  0710 09/20/24  1122 09/20/24  0728 09/19/24  2212 09/19/24  0446 09/17/24  0516 09/16/24  2357   SODIUM mmol/L 154*  --   --   --   --   --  147*  --  145   < > 137   POTASSIUM mmol/L 3.9 4.0 3.6   < > 3.4*   < > 2.7*   < > 2.3*   < > 4.8   CHLORIDE mmol/L 125*  --   --    --   --   --  117*  --  114*   < > 101   CO2 mmol/L 25.9  --   --   --   --   --  24.1  --  24.3   < > 23.0   BUN mg/dL 17  --   --   --   --   --  11  --  10   < > 24*   CREATININE mg/dL 0.43*  --  0.42*  --  0.36*  --  0.38*  --  0.41*   < > 1.06   CALCIUM mg/dL 8.5*  --   --   --   --   --  7.7*  --  7.3*   < > 9.0   BILIRUBIN mg/dL 0.4  --   --   --   --   --   --   --   --   --  0.4   ALK PHOS U/L 177*  --   --   --   --   --   --   --   --   --  70   ALT (SGPT) U/L 27  --   --   --   --   --   --   --   --   --  24   AST (SGOT) U/L 45*  --   --   --   --   --   --   --   --   --  27   GLUCOSE mg/dL 130*  --   --   --   --   --  126*  --  92   < > 87    < > = values in this interval not displayed.     Results from last 7 days   Lab Units 09/23/24 0948 09/21/24 1902 09/20/24 0728 09/19/24 0446   MAGNESIUM mg/dL 2.0 1.8  --  1.6   PHOSPHORUS mg/dL 0.3*  --   --   --    HEMOGLOBIN g/dL 8.5*  --    < > 8.2*   HEMATOCRIT % 28.5*  --    < > 27.3*   WBC 10*3/mm3 14.91*  --    < > 15.38*   ALBUMIN g/dL 1.7*  --   --   --     < > = values in this interval not displayed.     Results from last 7 days   Lab Units 09/23/24 0948 09/20/24 0728 09/19/24 0446 09/18/24  0709 09/17/24  1127   PLATELETS 10*3/mm3 219 241 252 248 267     COVID19   Date Value Ref Range Status   09/17/2024 Not Detected Not Detected - Ref. Range Final     Lab Results   Component Value Date    HGBA1C 5.80 (H) 01/23/2024          Medications           Scheduled Medications atorvastatin, 40 mg, Per PEG Tube, Daily  cefTRIAXone, 2,000 mg, Intravenous, Q24H  chlorhexidine, 15 mL, Mouth/Throat, Q12H  enoxaparin, 40 mg, Subcutaneous, Q24H  guaifenesin, 200 mg, Per PEG Tube, Q6H  ipratropium-albuterol, 3 mL, Nebulization, 4x Daily - RT  levothyroxine, 25 mcg, Per PEG Tube, Q AM  pantoprazole, 40 mg, Intravenous, Q24H  potassium phosphate, 15 mmol, Intravenous, Q3H  senna-docusate sodium, 2 tablet, Per PEG Tube, BID  sodium hypochlorite, , Topical,  Q12H  vancomycin, 1,000 mg, Intravenous, Q12H       Infusions dexmedetomidine, 0.2-1.5 mcg/kg/hr, Last Rate: Stopped (09/21/24 0252)  norepinephrine, 0.02-0.3 mcg/kg/min, Last Rate: Stopped (09/21/24 0716)  Pharmacy to dose vancomycin,   vasopressin, 0.03 Units/min, Last Rate: Stopped (09/18/24 1200)       PRN Medications   acetaminophen **OR** acetaminophen    senna-docusate sodium **AND** polyethylene glycol **AND** bisacodyl **AND** bisacodyl    Calcium Replacement - Follow Nurse / BPA Driven Protocol    dextrose    dextrose    glucagon (human recombinant)    Magnesium Standard Dose Replacement - Follow Nurse / BPA Driven Protocol    nitroglycerin    Pharmacy to dose vancomycin    Phosphorus Replacement - Follow Nurse / BPA Driven Protocol    Potassium Replacement - Follow Nurse / BPA Driven Protocol     Physical Findings          General Findings on oxygen therapy, responds/arouses to voice just extubated   Oral/Mouth Cavity tooth or teeth missing   Edema  2+ (mild), 3+ (moderate)   Gastrointestinal last bowel movement: 9/22   Skin  coccyx stage 4, left gluteal incision, left scapula stage 3, right scapula PI unstageable, posterior penis   Tubes/Drains/Lines PEG   NFPE See Malnutrition Severity Assessment, Date Completed: 9/18   --  Malnutrition Severity Assessment      Patient meets criteria for : Severe Malnutrition           Estimated/Assessed Needs        Current Weight  Weight: 85 kg (187 lb 6.3 oz) (09/23/24 0700)       Energy Requirements    Weight for Calculation 73.8 kg   Method for Estimation  30-35 kcal/kg   EST Needs (kcal/day) 4887-9719       Protein Requirements    Weight for Calculation 73.8 kg   EST Protein Needs (g/kg) 1.5 - 2.0 gm/kg   EST Daily Needs (g/day) 110-148       Fluid Requirements     Method for Estimation 1 mL/kcal    EST Needs (mL/day)      Current Nutrition Orders & Evaluation of Intake       Oral Nutrition     Food Allergies NKFA   Current PO Diet NPO Diet NPO Type: Tube Feeding    Supplement n/a   PO Evaluation     % PO Intake npo    Factors Affecting Intake: altered respiratory status   --   Enteral Nutrition     Enteral Route PEG    TF Delivery Method Continuous    Propofol Rate/Kcal     Current TF Order/Rate  Nutren 2.0 @ 50 mL/hr    TF Goal Rate 50 mL/hr    Current Water Flush 30 mL Q 4 hr    Modular Erich    TF Residual  no or minimal residual    TF Tolerance tolerating    TF Observation Verified correct TF and water flush infusing per orders     PES STATEMENT / NUTRITION DIAGNOSIS      Nutrition Dx Problem  Problem: Malnutrition (severe) and Increased Nutrient Needs  Etiology: Medical Diagnosis - respiratory failure on vent    Signs/Symptoms: NPO and Report/Observation     NUTRITION INTERVENTION / PLAN OF CARE      Intervention Goal(s) Improved nutrition related labs, Reduce/improve symptoms, Meet estimated needs, Disease management/therapy, Initiate TF/PN, Tolerate TF/PN at goal, and Maintain weight         RD Intervention/Action Await initiation of EN/PN, Continue to monitor, Care plan reviewed, and Recommend/order: TF's   --      Prescription/Orders:       PO Diet Speech eval when appropriate      Supplements       Enteral Nutrition       Parenteral Nutrition    New Prescription Ordered?    --   Enteral Prescription:     Enteral Route PEG    TF Delivery Method Continuous    Enteral Product Impact Peptide 1.5    Modular Erich     Propofol Rate/Kcal     TF Start Rate  50 mL/hr    TF Goal Rate  75 mL/hr    Free Water Flush 50 mL Q 1 hr    Provision at Goal: The above end goal rate is for 22 hrs/day to assume interruptions for ADLs. Water flushes adjusted based on clinical picture + Rx flushes/IV fluids         Calories 2475 kcals, meets 100% needs         Protein  155 gm protein, meets 100% needs         Fluid (mL) 1271 mL free water + 1200 mL in flushes         Monitor/Evaluation Per protocol, I&O, Pertinent labs, EN delivery/tolerance, Weight, Skin status, GI status, Symptoms,  POC/GOC, Hemodynamic stability   Discharge Plan/Needs Pending clinical course   --    RD to follow per protocol.      Electronically signed by:  Leobardo Duron RDN, LD  09/23/24 17:11 EDT

## 2024-09-23 NOTE — PLAN OF CARE
Problem: Communication Impairment (Mechanical Ventilation, Invasive)  Goal: Effective Communication  Outcome: Ongoing, Progressing     Problem: Device-Related Complication Risk (Mechanical Ventilation, Invasive)  Goal: Optimal Device Function  Outcome: Ongoing, Progressing  Intervention: Optimize Device Care and Function  Recent Flowsheet Documentation  Taken 9/23/2024 0000 by Michela Echevarria RN  Aspiration Precautions: tube feeding placement verified  Airway Safety Measures: suction at bedside  Taken 9/22/2024 2000 by Michela Echevarria RN  Airway Safety Measures: manual resuscitator/mask at bedside     Problem: Inability to Wean (Mechanical Ventilation, Invasive)  Goal: Mechanical Ventilation Liberation  Outcome: Ongoing, Progressing  Intervention: Promote Extubation and Mechanical Ventilation Liberation  Recent Flowsheet Documentation  Taken 9/23/2024 0000 by Michela Echevarria RN  Medication Review/Management: medications reviewed  Taken 9/22/2024 2000 by Michela Echevarria RN  Medication Review/Management: medications reviewed     Problem: Nutrition Impairment (Mechanical Ventilation, Invasive)  Goal: Optimal Nutrition Delivery  Outcome: Ongoing, Progressing     Problem: Skin and Tissue Injury (Mechanical Ventilation, Invasive)  Goal: Absence of Device-Related Skin and Tissue Injury  Outcome: Ongoing, Progressing  Intervention: Maintain Skin and Tissue Health  Recent Flowsheet Documentation  Taken 9/22/2024 2000 by Michela Echevarria RN  Device Skin Pressure Protection: skin-to-skin areas padded     Problem: Ventilator-Induced Lung Injury (Mechanical Ventilation, Invasive)  Goal: Absence of Ventilator-Induced Lung Injury  Outcome: Ongoing, Progressing  Intervention: Prevent Ventilator-Associated Pneumonia  Recent Flowsheet Documentation  Taken 9/23/2024 0200 by Michela Echevarria RN  Head of Bed (HOB) Positioning: HOB at 45 degrees  Taken 9/23/2024 0000 by Michela Echevarria RN  Head of Bed (HOB) Positioning: HOB at 30-45  degrees  Oral Care:   mouth wash rinse   oral rinse provided   suction provided   swabbed with sterile water  Taken 9/22/2024 2200 by Michela Echevarria RN  Head of Bed (HOB) Positioning: HOB at 30-45 degrees  Taken 9/22/2024 2000 by Michela Echevarria RN  Head of Bed ([facility]) Positioning: HOB elevated  Oral Care:   mouth wash rinse   oral rinse provided   lip/mouth moisturizer applied     Problem: Restraint, Nonviolent  Goal: Absence of Harm or Injury  Outcome: Ongoing, Progressing  Intervention: Implement Least Restrictive Safety Strategies  Recent Flowsheet Documentation  Taken 9/23/2024 0000 by Michela Echevarria RN  Medical Device Protection:   IV pole/bag removed from visual field   tubing secured  Taken 9/22/2024 2000 by Michela Echevarria RN  Medical Device Protection: IV pole/bag removed from visual field  Intervention: Protect Skin and Joint Integrity  Recent Flowsheet Documentation  Taken 9/23/2024 0200 by Michela Echevarria RN  Body Position:   turned   left  Taken 9/23/2024 0000 by Michela Echevarria RN  Body Position:   turned   right  Taken 9/22/2024 2200 by Michela Echevarria RN  Body Position:   turned   left   foot of bed elevated  Taken 9/22/2024 2000 by Michela Echevarria RN  Body Position:   tilted   right  Range of Motion: ROM (range of motion) performed     Problem: Skin Injury Risk Increased  Goal: Skin Health and Integrity  Outcome: Ongoing, Progressing  Intervention: Optimize Skin Protection  Recent Flowsheet Documentation  Taken 9/23/2024 0200 by Michela Echevarria RN  Head of Bed (HOB) Positioning: HOB at 45 degrees  Taken 9/23/2024 0000 by Michela Echevarria RN  Head of Bed (HOB) Positioning: HOB at 30-45 degrees  Taken 9/22/2024 2200 by Michela Echevarria RN  Head of Bed (HOB) Positioning: HOB at 30-45 degrees  Taken 9/22/2024 2000 by Michela Echevarria RN  Pressure Reduction Techniques: weight shift assistance provided  Head of Bed (HOB) Positioning: HOB elevated  Pressure Reduction Devices:   specialty bed utilized    pressure-redistributing mattress utilized  Skin Protection: adhesive use limited     Problem: Fall Injury Risk  Goal: Absence of Fall and Fall-Related Injury  Outcome: Ongoing, Progressing  Intervention: Identify and Manage Contributors  Recent Flowsheet Documentation  Taken 9/23/2024 0000 by Michela Echevarria RN  Medication Review/Management: medications reviewed  Taken 9/22/2024 2000 by Michela Echevarria RN  Medication Review/Management: medications reviewed  Intervention: Promote Injury-Free Environment  Recent Flowsheet Documentation  Taken 9/23/2024 0200 by Michela Echevarria RN  Safety Promotion/Fall Prevention: safety round/check completed  Taken 9/23/2024 0000 by Michela Echevarria RN  Safety Promotion/Fall Prevention: safety round/check completed  Taken 9/22/2024 2200 by Michela Echevarria RN  Safety Promotion/Fall Prevention: safety round/check completed  Taken 9/22/2024 2000 by Michela Echevarria RN  Safety Promotion/Fall Prevention:   activity supervised   safety round/check completed     Problem: Adjustment to Illness (Sepsis/Septic Shock)  Goal: Optimal Coping  Outcome: Ongoing, Progressing     Problem: Bleeding (Sepsis/Septic Shock)  Goal: Absence of Bleeding  Outcome: Ongoing, Progressing     Problem: Glycemic Control Impaired (Sepsis/Septic Shock)  Goal: Blood Glucose Level Within Desired Range  Outcome: Ongoing, Progressing     Problem: Infection Progression (Sepsis/Septic Shock)  Goal: Absence of Infection Signs and Symptoms  Outcome: Ongoing, Progressing  Intervention: Initiate Sepsis Management  Recent Flowsheet Documentation  Taken 9/23/2024 0000 by Michela Echevarria RN  Infection Management: aseptic technique maintained  Infection Prevention:   single patient room provided   rest/sleep promoted   hand hygiene promoted  Isolation Precautions: precautions maintained  Taken 9/22/2024 2200 by Michela Echevarria RN  Infection Prevention:   single patient room provided   rest/sleep promoted   hand hygiene promoted    environmental surveillance performed  Isolation Precautions: precautions maintained  Taken 9/22/2024 2000 by Michela Echevarria RN  Infection Prevention:   single patient room provided   rest/sleep promoted   hand hygiene promoted   environmental surveillance performed  Isolation Precautions: precautions maintained  Intervention: Promote Recovery  Recent Flowsheet Documentation  Taken 9/23/2024 0200 by Michela Echevarria RN  Activity Management: bedrest  Taken 9/23/2024 0000 by Michela Echevarria RN  Activity Management: bedrest  Taken 9/22/2024 2000 by Michela Echevarria RN  Activity Management: bedrest     Problem: Nutrition Impaired (Sepsis/Septic Shock)  Goal: Optimal Nutrition Intake  Outcome: Ongoing, Progressing     Problem: Adult Inpatient Plan of Care  Goal: Plan of Care Review  Outcome: Ongoing, Progressing  Flowsheets (Taken 9/23/2024 0642)  Progress: no change  Outcome Evaluation: Patient alert orienetd / follow some commands / poor speech / BIPAP during night tour placed / & 5 LPM / Peg tube feedings tolerted well / turneed Q 2 hours / Multiple dressing changed this am pain medicated before dressing changes/ still sinus tachycardi [id number] to 130's during tour / isolation mentained / Mariscal care / VS stable  Goal: Patient-Specific Goal (Individualized)  Outcome: Ongoing, Progressing  Goal: Absence of Hospital-Acquired Illness or Injury  Outcome: Ongoing, Progressing  Intervention: Identify and Manage Fall Risk  Recent Flowsheet Documentation  Taken 9/23/2024 0200 by Michela Echevarria RN  Safety Promotion/Fall Prevention: safety round/check completed  Taken 9/23/2024 0000 by Michela Echevarria RN  Safety Promotion/Fall Prevention: safety round/check completed  Taken 9/22/2024 2200 by Michela Echevarria RN  Safety Promotion/Fall Prevention: safety round/check completed  Taken 9/22/2024 2000 by Michela Echevarria RN  Safety Promotion/Fall Prevention:   activity supervised   safety round/check completed  Intervention: Prevent Skin  Injury  Recent Flowsheet Documentation  Taken 9/23/2024 0200 by Michela Echevarria RN  Body Position:   turned   left  Taken 9/23/2024 0000 by Michela Echevarria RN  Body Position:   turned   right  Taken 9/22/2024 2200 by Michela Echevarria RN  Body Position:   turned   left   foot of bed elevated  Taken 9/22/2024 2000 by Michela Echevarria RN  Body Position:   tilted   right  Skin Protection: adhesive use limited  Intervention: Prevent and Manage VTE (Venous Thromboembolism) Risk  Recent Flowsheet Documentation  Taken 9/23/2024 0200 by Michela Echevarria RN  Activity Management: bedrest  Taken 9/23/2024 0000 by Michela Echevarria RN  Activity Management: bedrest  Taken 9/22/2024 2000 by Michela Echevarria RN  Activity Management: bedrest  VTE Prevention/Management:   bilateral   sequential compression devices on  Range of Motion: ROM (range of motion) performed  Intervention: Prevent Infection  Recent Flowsheet Documentation  Taken 9/23/2024 0000 by Michela Echevarria RN  Infection Prevention:   single patient room provided   rest/sleep promoted   hand hygiene promoted  Taken 9/22/2024 2200 by Michela Echevarria RN  Infection Prevention:   single patient room provided   rest/sleep promoted   hand hygiene promoted   environmental surveillance performed  Taken 9/22/2024 2000 by Michela Echevarria RN  Infection Prevention:   single patient room provided   rest/sleep promoted   hand hygiene promoted   environmental surveillance performed  Goal: Optimal Comfort and Wellbeing  Outcome: Ongoing, Progressing  Intervention: Monitor Pain and Promote Comfort  Recent Flowsheet Documentation  Taken 9/22/2024 2000 by Michela Echevarria RN  Pain Management Interventions:   see MAR   quiet environment facilitated  Goal: Readiness for Transition of Care  Outcome: Ongoing, Progressing     Problem: Aspiration (Enteral Nutrition)  Goal: Absence of Aspiration Signs and Symptoms  Outcome: Ongoing, Progressing  Intervention: Minimize Aspiration Risk  Recent Flowsheet  Documentation  Taken 9/23/2024 0200 by Michela Echevarria RN  Head of Bed ([facility]) Positioning: HOB at 45 degrees  Taken 9/23/2024 0000 by Michela Echevarria RN  Head of Bed ([facility]) Positioning: HOB at 30-45 degrees  Oral Care:   mouth wash rinse   oral rinse provided   suction provided   swabbed with sterile water  Taken 9/22/2024 2200 by Michela Echevarria RN  Head of Bed ([facility]) Positioning: HOB at 30-45 degrees  Taken 9/22/2024 2000 by Michela Echevarria RN  Head of Bed ([facility]) Positioning: HOB elevated  Oral Care:   mouth wash rinse   oral rinse provided   lip/mouth moisturizer applied     Problem: Device-Related Complication Risk (Enteral Nutrition)  Goal: Safe, Effective Therapy Delivery  Outcome: Ongoing, Progressing     Problem: Feeding Intolerance (Enteral Nutrition)  Goal: Feeding Tolerance  Outcome: Ongoing, Progressing   Goal Outcome Evaluation:           Progress: no change  Outcome Evaluation: Patient alert orienetd / follow some commands / poor speech / BIPAP during night tour placed / & 5 LPM / Peg tube feedings tolerted well / turneed Q 2 hours / Multiple dressing changed this am pain medicated before dressing changes/ still sinus tachycardi [id number] to 130's during tour / isolation mentained / Mariscal care / VS stable

## 2024-09-23 NOTE — PLAN OF CARE
Goal Outcome Evaluation:  Plan of Care Reviewed With: patient        Progress: no change       Pt responds to name, but speech remains incoherent, 5L NC, VSS. Neuro consulted for recent changes in speech, MRI brain w/ contrast ordered. Nephro consulted for Na 154. Was told to hold tube feeds for 24 hrs and desmopressin ordered. Free water flushes changed to 50ml q 1hr. Wound dx's changed per orders. Phos being replaced per protocol. WCTM.

## 2024-09-23 NOTE — PROGRESS NOTES
"ID NOTE    CC: f/u \"sepsis; antbiotic recommendations\"    Subj: No acute events.  He has been dealing with some tachycardia.  He has been afebrile.    Medications:    Current Facility-Administered Medications:     acetaminophen (TYLENOL) tablet 650 mg, 650 mg, Per PEG Tube, Q4H PRN, 650 mg at 09/23/24 0541 **OR** acetaminophen (TYLENOL) suppository 650 mg, 650 mg, Rectal, Q4H PRN, Dez Thakkar MD    atorvastatin (LIPITOR) tablet 40 mg, 40 mg, Per PEG Tube, Daily, Dez Thakkar MD, 40 mg at 09/22/24 0828    sennosides-docusate (PERICOLACE) 8.6-50 MG per tablet 2 tablet, 2 tablet, Per PEG Tube, BID, 2 tablet at 09/22/24 2013 **AND** polyethylene glycol (MIRALAX) packet 17 g, 17 g, Per PEG Tube, Daily PRN **AND** bisacodyl (DULCOLAX) EC tablet 5 mg, 5 mg, Oral, Daily PRN **AND** bisacodyl (DULCOLAX) suppository 10 mg, 10 mg, Rectal, Daily PRN, Dez Thakkar MD    Calcium Replacement - Follow Nurse / BPA Driven Protocol, , Does not apply, PRN, Dez Thakkar MD    cefTRIAXone (ROCEPHIN) 2,000 mg in sodium chloride 0.9 % 100 mL MBP, 2,000 mg, Intravenous, Q24H, Dez Thakkar MD, Last Rate: 200 mL/hr at 09/22/24 1217, 2,000 mg at 09/22/24 1217    chlorhexidine (PERIDEX) 0.12 % solution 15 mL, 15 mL, Mouth/Throat, Q12H, Dez Thakkar MD, 15 mL at 09/22/24 2014    dexmedetomidine (PRECEDEX) 400 mcg in 100 mL NS infusion, 0.2-1.5 mcg/kg/hr, Intravenous, Titrated, Dez Thakkar MD, Stopped at 09/21/24 0252    dextrose (D50W) (25 g/50 mL) IV injection 25 g, 25 g, Intravenous, Q15 Min PRN, Dez Thakkar MD, 25 g at 09/22/24 1405    dextrose (GLUTOSE) oral gel 15 g, 15 g, Oral, Q15 Min PRN, Dez Thakkar MD    Enoxaparin Sodium (LOVENOX) syringe 40 mg, 40 mg, Subcutaneous, Q24H, Dez Thakkar MD, 40 mg at 09/22/24 1632    glucagon (GLUCAGEN) injection 1 mg, 1 mg, Subcutaneous, Q15 Min PRN, Dez Thakkar MD    guaifenesin (ROBITUSSIN) 100 MG/5ML liquid " 200 mg, 200 mg, Per PEG Tube, Q6H, Dez Thakkar MD, 200 mg at 09/23/24 0225    ipratropium-albuterol (DUO-NEB) nebulizer solution 3 mL, 3 mL, Nebulization, 4x Daily - RT, Dez Thakkar MD, 3 mL at 09/23/24 0719    levothyroxine (SYNTHROID, LEVOTHROID) tablet 25 mcg, 25 mcg, Per PEG Tube, Q AM, Dez Thakkar MD, 25 mcg at 09/23/24 0538    Magnesium Standard Dose Replacement - Follow Nurse / BPA Driven Protocol, , Does not apply, PRN, Dez Thakkar MD    nitroglycerin (NITROSTAT) SL tablet 0.4 mg, 0.4 mg, Sublingual, Q5 Min PRN, Dez Thakkar MD    norepinephrine (LEVOPHED) 8 mg in 250 mL NS infusion (premix), 0.02-0.3 mcg/kg/min, Intravenous, Titrated, Dez Thakkar MD, Stopped at 09/21/24 0716    pantoprazole (PROTONIX) injection 40 mg, 40 mg, Intravenous, Q24H, Dez Thakkar MD, 40 mg at 09/22/24 0824    Pharmacy to dose vancomycin, , Does not apply, Continuous PRN, Uche Toribio MD    Phosphorus Replacement - Follow Nurse / BPA Driven Protocol, , Does not apply, PRN, Dez Thakkar MD    Potassium Replacement - Follow Nurse / BPA Driven Protocol, , Does not apply, PRN, Dez Thakkar MD    sodium chloride 0.9 % infusion, 50 mL/hr, Intravenous, Continuous, Dez Thakkar MD, Last Rate: 50 mL/hr at 09/22/24 1800, 50 mL/hr at 09/22/24 1800    sodium hypochlorite (DAKIN'S 1/4 STRENGTH) 0.125 % topical solution 0.125% solution, , Topical, Q12H, Dez Thakkar MD, Given at 09/22/24 2013    vancomycin (VANCOCIN) 1,000 mg in sodium chloride 0.9 % 250 mL IVPB-VTB, 1,000 mg, Intravenous, Q12H, Uche Toribio MD, Last Rate: 250 mL/hr at 09/22/24 2224, 1,000 mg at 09/22/24 2224    vasopressin (PITRESSIN) 20 units in 100 mL NS infusion, 0.03 Units/min, Intravenous, Continuous, EsterleDez MD, Stopped at 09/18/24 1200      Objective   Vital Signs   Temp:  [97.5 °F (36.4 °C)-99.7 °F (37.6 °C)] 97.5 °F (36.4 °C)  Heart  Rate:  [109-128] 113  Resp:  [22-42] 33  BP: (127-162)/(65-82) 127/65    Physical Exam:   General: awake, acute and chronically ill-appearing, sitting up in bed with CPAP in place  Eyes: no scleral icterus  Cardiovascular: Tachycardic  Respiratory: Rales are much improved today  GI: Abdomen with PEG tube present  : + Mariscal catheter  MSK/skin: Left foot is bandaged; open wounds on both scapulae, right hip;and sacrum  Vasc: PIV w/o erythema    Labs:   CBC, BMP, LFTs ordered and are pending; procalcitonin reviewed today  Lab Results   Component Value Date    WBC 12.56 (H) 09/20/2024    HGB 8.5 (L) 09/20/2024    HCT 26.0 (L) 09/20/2024    MCV 82.8 09/20/2024     09/20/2024       Lab Results   Component Value Date    GLUCOSE 126 (H) 09/20/2024    BUN 11 09/20/2024    CREATININE 0.42 (L) 09/22/2024    EGFRIFNONA 69 03/27/2018    EGFRIFAFRI 84 03/27/2018    BCR 28.9 (H) 09/20/2024    CO2 24.1 09/20/2024    CALCIUM 7.7 (L) 09/20/2024    PROTENTOTREF 8.2 08/12/2024    ALBUMIN 2.3 (L) 09/16/2024    LABIL2 0.4 (L) 08/12/2024    AST 27 09/16/2024    ALT 24 09/16/2024     Lab Results   Component Value Date    VANCOTROUGH 12.70 09/19/2024    VANCORANDOM 5.30 09/18/2024     Lab Results   Component Value Date    HGBA1C 5.80 (H) 01/23/2024       Procal 68---> 6    Microbiology:  9/16 BCx: Negative  9/17 UCX: Negative  9/17 RPP: Negative  9/17 respiratory culture: Moderate growth MRSA and moderate growth E. Coli  9/17 Strep and Legionella urine antigens: Negative  9/17 MRSA nares PCR: +    Radiology:  No new imaging today    ASSESSMENT/PLAN:  Septic shock present on admission -better  MRSA pneumonia  E. coli pneumonia  Acute hypoxic respiratory failure  Chronic Mariscal catheter  History of ESBL E. coli UTI  Ankylosing spondylitis on methotrexate  Dysphagia with PEG tube  History of osteomyelitis of the calcaneus and sacrum  Elevated procalcitonin - better    He is afebrile and his procalcitonin has improved from 68 to 6.      For MRSA and E. coli pneumonia, I recommend that he continue treatment with vancomycin and ceftriaxone through 9/24/2025.  His lung exam does sound improved today.    Continue local wound care to scapula, hip, sacrum and heel.     Long-term prognosis appears very poor.    Thank you for allowing me to be involved in the care of this patient. Infectious diseases will sign off at this time with antibiotics plan in place, but please call me at [phone] if any further ID questions or new ID concerns.

## 2024-09-23 NOTE — CONSULTS
Neurology Consult Note    Consult Date: 9/23/2024    Referring MD: Livier Vegas MD    Reason for Consult I have been asked to see the patient in neurological consultation to render advice and opinion regarding unintelligible speech.     Anthony Gallegos is a 79 y.o. male with PMH o HTN, HLD, ankylosing spondylitis, MARTITA, multiple decubitus ulcers, chronic forde, PEG tube dependency, and recent hospital admission for calcaneal osteomyelitis presents to the ED on 9/16 with AMS and found to be  hypoxic and hypotensive.  Labs were notable for WBC 6, lactate 6, Pro-Carmelo 68, RPP negative, and lipase 8. CXR showed bilateral lung opacities. CT of the head did not show any acute process. He was intubated and admitted to the intensive care unit. He was started on vasopressors. His blood cultures are negative to date. His respiratory culture showed MRSA and E. coli. Patient  found to be septic secondary to bilateral aspiration pneumonia. He has since been treated wit meropenum discontinued on 9/19 and continues to Vancomycin and Rocephin through 9/24.    Neurology consulted for unintelligible speech new since last admission which was noted post extubation on 9/20. Unclear when this began exactly. Patient able to follow several simple and some multi-step commands on exam. No cranial nerve deficits. Patient generally weak but not worse on one side. Sensation in tact and dysmetria difficulty to assess but nothing obvious on exam. History unable to be obtained from patient with such impaired speech and no family current at bedside. Patient vitals 127/65 mmHg, pulse 107, RR 32, temp 96.8 degree F.     Past Medical/Surgical Hx:  Past Medical History:   Diagnosis Date    Abscess of scrotum     MARTITA (acute kidney injury)     Altered mental state     Arthritis     AS (ankylosing spondylitis)     History of MRSA infection     Hypertension     Leukocytosis     Tetrahydrocannabinol (THC) use disorder, mild, abuse 12/20/2023    Uveitis       Past Surgical History:   Procedure Laterality Date    COLONOSCOPY      ENDOSCOPY W/ PEG TUBE PLACEMENT N/A 03/29/2024    Procedure: ESOPHAGOGASTRODUODENOSCOPY WITH PERCUTANEOUS ENDOSCOPIC GASTROSTOMY TUBE INSERTION;  Surgeon: Khadar Broderick MD;  Location: [facility] ENDOSCOPY;  Service: General;  Laterality: N/A;  PRE/POST - DYSPHAGIA    ROTATOR CUFF REPAIR Right     TOTAL HIP ARTHROPLASTY Left 2014    UPPER GASTROINTESTINAL ENDOSCOPY      WOUND DEBRIDEMENT N/A 08/20/2024    Procedure: DEBRIDEMENT SACRAL ULCER/WOUND;  Surgeon: Justine Roque MD;  Location: [facility] MAIN OR;  Service: General;  Laterality: N/A;    WOUND DEBRIDEMENT Left 08/20/2024    Procedure: LEFT DEBRIDEMENT FOOT;  Surgeon: Renny Duval DPM;  Location: [facility] MAIN OR;  Service: Podiatry;  Laterality: Left;       Medications On Admission  Medications Prior to Admission   Medication Sig Dispense Refill Last Dose    Ascorbic Acid (VITAMIN C PO) Take 500 mg by mouth Daily.   9/16/2024    atorvastatin (LIPITOR) 40 MG tablet Take 1 tablet by mouth Every Night.   9/16/2024    bisacodyl (DULCOLAX) 10 MG suppository Insert 1 suppository into the rectum Daily As Needed for Constipation (Use if bisacodyl oral is ineffective).       bisacodyl (DULCOLAX) 5 MG EC tablet Take 1 tablet by mouth Daily As Needed for Constipation (Use if polyethylene glycol is ineffective).       calcium carbonate (TUMS) 500 MG chewable tablet Chew 2 tablets 2 (Two) Times a Day As Needed for Heartburn.       collagenase 250 UNIT/GM ointment Apply 1 Application topically to the appropriate area as directed Daily.   9/16/2024    cyclobenzaprine (FLEXERIL) 10 MG tablet Take 0.5 tablets by mouth At Night As Needed.       HYDROcodone-acetaminophen (NORCO) 5-325 MG per tablet Take 1 tablet by mouth 3 (Three) Times a Day As Needed.   9/16/2024    lansoprazole (PREVACID SOLUTAB) 30 MG Tablet Delayed Release Dispersible disintegrating tablet Administer 1 tablet per G tube Every  Morning.   9/16/2024    levothyroxine (SYNTHROID, LEVOTHROID) 25 MCG tablet Take 1 tablet by mouth Daily.   9/16/2024    melatonin 3 MG tablet Take 1 tablet by mouth At Night As Needed for Sleep.       methotrexate 2.5 MG tablet Take 1 tablet by mouth 1 (One) Time Per Week. Take 2.5 mg on Wednesday and 2.5 mg on Thursday.  Do not take any medication on Friday through Tuesday. (Patient taking differently: Take 1 tablet by mouth 2 (Two) Times a Week. Take 2.5 mg on Wednesday and 2.5 mg on Thursday.  Do not take any medication on Friday through Tuesday.)   Past Week    metoprolol succinate XL (TOPROL-XL) 25 MG 24 hr tablet Take 1 tablet by mouth Daily.   9/16/2024    polyethylene glycol (MIRALAX) 17 g packet Take 17 g by mouth Daily As Needed (Use if senna-docusate is ineffective).       Potassium Bicarb-Citric Acid (Effer-K) 20 MEQ effervescent tablet Take 1 tablet by mouth Daily.   9/16/2024    potassium chloride (KAYCIEL) 20 mEq/15 mL solution Take 15 mL by mouth Daily.   9/16/2024    sennosides-docusate (PERICOLACE) 8.6-50 MG per tablet Take 2 tablets by mouth 2 (Two) Times a Day.   9/16/2024    sodium hypochlorite (DAKIN'S 1/4 STRENGTH) 0.125 % solution topical solution 0.125% Apply  topically to the appropriate area as directed 3 times a day.   9/16/2024    terazosin (HYTRIN) 1 MG capsule Administer 1 capsule per G tube Every Night. (Patient taking differently: Take 1 capsule by mouth Every Night.)   9/16/2024    Wound Dressings (Medihoney Wound/Burn Dressing) Paste Apply 1 Application topically to the appropriate area as directed Daily.   9/16/2024       Allergies:  No Known Allergies    Social Hx:  Social History     Socioeconomic History    Marital status:     Number of children: 2   Tobacco Use    Smoking status: Never    Smokeless tobacco: Never   Vaping Use    Vaping status: Never Used   Substance and Sexual Activity    Alcohol use: No    Drug use: No    Sexual activity: Defer       Family  Hx:  Family History   Problem Relation Age of Onset    Alzheimer's disease Mother     Colon cancer Neg Hx     Colon polyps Neg Hx     Crohn's disease Neg Hx     Irritable bowel syndrome Neg Hx     Ulcerative colitis Neg Hx     Malig Hyperthermia Neg Hx        Exam    /65 (BP Location: Right arm, Patient Position: Lying)   Pulse 107   Temp 97.5 °F (36.4 °C) (Axillary)   Resp (!) 32   Wt 85 kg (187 lb 6.3 oz)   SpO2 96%   BMI 23.42 kg/m²   gen: NAD, vitals reviewed  HEENT: no nuchal rigidity  MS: oriented x3, speech unintelligible with intact comprehension able to follow several simple and some multi-step command, no neglect, normal fund of knowledge  CN: blinks to threat bilaterally, PERRL, EOMI, no facial droop, palate elevates symmetrically, shoulder shrug equal, tongue midline  Motor: 4+/5 bilateral upper extremities, 2+/5 bilateral lower extremities  Sensation: intact to nail bed stimulation throughout  Coordination: no dysmetria with finger to nose bilaterally, but limited as only goes half way to chin and then to finger    DATA:    Lab Results   Component Value Date    GLUCOSE 126 (H) 09/20/2024    CALCIUM 7.7 (L) 09/20/2024     (H) 09/20/2024    K 4.0 09/22/2024    CO2 24.1 09/20/2024     (H) 09/20/2024    BUN 11 09/20/2024    CREATININE 0.42 (L) 09/22/2024    EGFRIFAFRI 84 03/27/2018    EGFRIFNONA 69 03/27/2018    BCR 28.9 (H) 09/20/2024    ANIONGAP 5.9 09/20/2024     Lab Results   Component Value Date    WBC 14.91 (H) 09/23/2024    HGB 8.5 (L) 09/23/2024    HCT 28.5 (L) 09/23/2024    MCV 84.6 09/23/2024     09/23/2024     Lab Results   Component Value Date    LDL 91 03/27/2018     Lab Results   Component Value Date    HGBA1C 5.80 (H) 01/23/2024     Lab Results   Component Value Date    INR 1.52 (H) 08/17/2024    INR 1.20 (H) 12/20/2023    PROTIME 18.6 (H) 08/17/2024    PROTIME 15.3 (H) 12/20/2023       Lab review:   Glucose 126  Calcium 7.7  Na 147  WBC 14.91  HgB  8.5  Procalcitonin 6.77    Imaging review:   CTH without contrast:  FINDINGS:    Brain:  No acute intracranial abnormality.  Consider MRI if there is   further concern.  Areas of decreased attenuation in the deep cerebral   white matter are consistent with small vessel ischemic degenerative   changes.  The cerebral and cerebellar sulci are prominent consistent with   brain atrophy.  No hemorrhage.    Ventricles:  Unremarkable.  No ventriculomegaly.    Bones joints:  Unremarkable.  No acute fracture.    Soft tissues:  Unremarkable.    Vasculature:  Atherosclerotic disease.    Sinuses:  Unremarkable as visualized.    Mastoid air cells:  Unremarkable as visualized.  No mastoid effusion.     IMPRESSION:       1.  No acute intracranial abnormality.  Consider MRI if there is further   concern.  2.  Small vessel ischemic degenerative changes.  3.  Cerebral and cerebellar atrophy.    Diagnoses:  Unintelligible speech  Bilateral Aspiration Pneumonia with acute respiratory failure with positive e.coli and MRSA respiratory cultures.   Sepsis   HLD   HTN  Chronic forde  PEG tube dependence.   Multiple decubitus ulcers  Recent hospitalization for left calcaneal ostomyelitis  Ankylosing spondylitis chronically on MTX    Comment: Patient is an 79 y.o. male with PMH o HTN, HLD, ankylosing spondylitis, MARTITA, multiple decubitus ulcers, chronic forde, PEG tube dependency, and recent hospital admission for calcaneal osteomyelitis presents to the ED on 9/16 with AMS and found to be  hypoxic and hypotensive.  Labs were notable for WBC 6, lactate 6, Pro-Carmelo 68, RPP negative, and lipase 8. CXR showed bilateral lung opacities. CT of the head did not show any acute process. He was intubated and admitted to the intensive care unit. He was started on vasopressors. His blood cultures are negative to date. His respiratory culture showed MRSA and E. coli. Patient  found to be septic secondary to bilateral aspiration pneumonia. He has since been  treated wit meropenum discontinued on 9/19 and continues to Vancomycin and Rocephin through 9/24.    Neurology consulted for unintelligible speech. Speech concerning for stroke vs encephalitis vs metabolic encephalopathy vs medication side effect. Patient on robitussin, ceftriaxone, and fetanyl. Also had meropenum discontinued on 9/19. Will plan for MRI brain with and without contrast to further evaluated. If MRI negative, may need to consider medication adjustments. Patient should continue full course of antibiotics.     PLAN:   MRI brain with and without contrast  Continue on Rocephin and Vancomycin per ID recommendations.    Recommendations discussed with Dr. Ibarra who is in agreement with plan.

## 2024-09-23 NOTE — PROGRESS NOTES
"Enter Query Response Below      Query Response: Stage 4 Pressure Injury to Coccyx; Unstageable pressure injury to left heel, right lateral foot , right and left scapula, left gluteal;              If applicable, please update the problem list.   Patient: Anthony Gallegos        : 1945  Account: [id number]           Admit Date:         How to Respond to this query:       a. Click New Note     b. Answer query within the yellow box.                c. Update the Problem List, if applicable.      If you have any questions about this query contact me at: [email or web address]      Dr. Mancia    79-year male patient with PMHx that includes multiple pressure wounds, left calcaneal osteomyelitis, status post debridement of sacral and left foot wound () admitted 24 with septic shock, bilateral aspiration pneumonia, acute hypoxic/hypercapnic respiratory failure requiring intubation with mechanical ventilation, type 2 NSTEMI, multiple pressure wounds, severe malnutrition, immobility.     24  Wound nurse assessment states,  Side \" Left\" Location \" gluteal\" pressure injury stage \" U\"   Location : Coccyx\"  Pressure injury stage \" 4\"   Side \" Left \" Location  \"scapula \"  Pressure injury stage \" U\"   Side \" Right \"  Location \"scapula\" Pressure injury stage \"U\"  \"Right distal leg pressure injury \"  Pressure Injury Stage \" DTPI\"   \"Right lateral foot Pressure Injury \"  Pressure Injury Stage \" U\"   \"Right posterior greater trochanter Pressure Injury\" Pressure Injury Stage \" DTPI\"   \" Right ischial tuberosity Pressure Injury\"  Pressure Injury Stage \" DTPI\"   Side \" Left \" Location \" heel \" Primary Wound Type \" Pressure Injury \" Pressure Injury Stage \" U\"     Wound photos available to view in EPIC.Treatment has included Dakin's dressing changes, specialty mattress.     Please clarify if the patient was treated/monitored for:  > Stage 4 Pressure Injury to Coccyx; Unstageable pressure injury to left heel, right " lateral foot , right and left scapula, left gluteal;   Deep tissue pressure injury to right distal leg, right ischial tuberosity , right greater trochanter  > Other- specify______    By submitting this query, we are merely seeking further clarification of documentation to accurately reflect all conditions that you are monitoring, evaluating, treating or that extend the hospitalization or utilize additional resources of care. Please utilize your independent clinical judgment when addressing the question(s) above.     This query and your response, once completed, will be entered into the legal medical record.    Sincerely,  Radha Padilla RN, CCDS  Clinical Documentation Integrity Program   [email or web address]

## 2024-09-23 NOTE — CONSULTS
[facility] Consult Note      Patient Name: Anthony Gallegos  : 1945  MRN: [id number]  Primary Care Physician:  Keshav Eason MD  Referring Physician: Livier Vegas MD  Date of admission: 2024    Subjective     Reason for Consult: Hypernatremia    HPI:   Anthony Gallegos is a 79 y.o. male with past medical history of ankylosing spondylitis, hypertension, failure to thrive, chronic decubitus wounds chronic dysphagia with PEG tube placement chronic normocytic anemia with severe malnutrition presents to the emerged department with altered mental status on 2024 where he was admitted for further management.     in the emergency department patient was found to have multilobar pneumonia that required intubation and mechanical support later extubated currently on broad- spectrum antibiotics with onset of hypernatremia    Patient with significant third spacing and anasarca with little intravascular volume   With significant water deficit    Nephrology consultation been requested for further management    Patient is unable to provide any significant information.  Gathered from patient's chart and nursing staff at bedside    Review of Systems:   14 point review of systems is otherwise negative except for mentioned above on HPI    Personal History     Past Medical History:   Diagnosis Date    Abscess of scrotum     MARTITA (acute kidney injury)     Altered mental state     Arthritis     AS (ankylosing spondylitis)     History of MRSA infection     Hypertension     Leukocytosis     Tetrahydrocannabinol (THC) use disorder, mild, abuse 2023    Uveitis        Past Surgical History:   Procedure Laterality Date    COLONOSCOPY      ENDOSCOPY W/ PEG TUBE PLACEMENT N/A 2024    Procedure: ESOPHAGOGASTRODUODENOSCOPY WITH PERCUTANEOUS ENDOSCOPIC GASTROSTOMY TUBE INSERTION;  Surgeon: Khadar Broderick MD;  Location: [facility] ENDOSCOPY;  Service: General;  Laterality: N/A;  PRE/POST  - DYSPHAGIA    ROTATOR CUFF REPAIR Right     TOTAL HIP ARTHROPLASTY Left 2014    UPPER GASTROINTESTINAL ENDOSCOPY      WOUND DEBRIDEMENT N/A 08/20/2024    Procedure: DEBRIDEMENT SACRAL ULCER/WOUND;  Surgeon: Justine Roque MD;  Location: [facility] MAIN OR;  Service: General;  Laterality: N/A;    WOUND DEBRIDEMENT Left 08/20/2024    Procedure: LEFT DEBRIDEMENT FOOT;  Surgeon: Renny Duval DPM;  Location: [facility] MAIN OR;  Service: Podiatry;  Laterality: Left;       Family History: family history includes Alzheimer's disease in his mother.    Social History:  reports that he has never smoked. He has never used smokeless tobacco. He reports that he does not drink alcohol and does not use drugs.    Home Medications:  Prior to Admission medications    Medication Sig Start Date End Date Taking? Authorizing Provider   Ascorbic Acid (VITAMIN C PO) Take 500 mg by mouth Daily.   Yes Elodia Sánchez MD   atorvastatin (LIPITOR) 40 MG tablet Take 1 tablet by mouth Every Night. 7/26/24  Yes Elodia Sánchez MD   bisacodyl (DULCOLAX) 10 MG suppository Insert 1 suppository into the rectum Daily As Needed for Constipation (Use if bisacodyl oral is ineffective). 6/27/24  Yes Shola Betancourt MD   bisacodyl (DULCOLAX) 5 MG EC tablet Take 1 tablet by mouth Daily As Needed for Constipation (Use if polyethylene glycol is ineffective). 6/27/24  Yes Shola Betancourt MD   calcium carbonate (TUMS) 500 MG chewable tablet Chew 2 tablets 2 (Two) Times a Day As Needed for Heartburn. 6/27/24  Yes Shola Betancourt MD   collagenase 250 UNIT/GM ointment Apply 1 Application topically to the appropriate area as directed Daily.   Yes Elodia Sánchez MD   cyclobenzaprine (FLEXERIL) 10 MG tablet Take 0.5 tablets by mouth At Night As Needed.   Yes Elodia Sánchez MD   HYDROcodone-acetaminophen (NORCO) 5-325 MG per tablet Take 1 tablet by mouth 3 (Three) Times a Day As Needed.   Yes Elodia Sánchez MD    lansoprazole (PREVACID SOLUTAB) 30 MG Tablet Delayed Release Dispersible disintegrating tablet Administer 1 tablet per G tube Every Morning. 4/10/24  Yes Shola Betancourt MD   levothyroxine (SYNTHROID, LEVOTHROID) 25 MCG tablet Take 1 tablet by mouth Daily. 8/14/24  Yes Elodia Sánchez MD   melatonin 3 MG tablet Take 1 tablet by mouth At Night As Needed for Sleep. 1/30/24  Yes Lakesha Sykes APRN   methotrexate 2.5 MG tablet Take 1 tablet by mouth 1 (One) Time Per Week. Take 2.5 mg on Wednesday and 2.5 mg on Thursday.  Do not take any medication on Friday through Tuesday.  Patient taking differently: Take 1 tablet by mouth 2 (Two) Times a Week. Take 2.5 mg on Wednesday and 2.5 mg on Thursday.  Do not take any medication on Friday through Tuesday. 4/9/24  Yes Shola Betancourt MD   metoprolol succinate XL (TOPROL-XL) 25 MG 24 hr tablet Take 1 tablet by mouth Daily. 8/29/24  Yes Nathanael Samuels MD   polyethylene glycol (MIRALAX) 17 g packet Take 17 g by mouth Daily As Needed (Use if senna-docusate is ineffective). 6/27/24  Yes Shola Betancourt MD   Potassium Bicarb-Citric Acid (Effer-K) 20 MEQ effervescent tablet Take 1 tablet by mouth Daily.   Yes Elodia Sánchez MD   potassium chloride (KAYCIEL) 20 mEq/15 mL solution Take 15 mL by mouth Daily.   Yes Elodia Sánchez MD   sennosides-docusate (PERICOLACE) 8.6-50 MG per tablet Take 2 tablets by mouth 2 (Two) Times a Day. 8/28/24  Yes Nathanael Samuels MD   sodium hypochlorite (DAKIN'S 1/4 STRENGTH) 0.125 % solution topical solution 0.125% Apply  topically to the appropriate area as directed 3 times a day.   Yes Elodia Sánchez MD   terazosin (HYTRIN) 1 MG capsule Administer 1 capsule per G tube Every Night.  Patient taking differently: Take 1 capsule by mouth Every Night. 4/9/24  Yes Shola Betancourt MD   Wound Dressings ([facility] Wound/Burn Dressing) Paste Apply 1 Application topically to the  appropriate area as directed Daily.   Yes Provider, Historical, MD       Allergies:  No Known Allergies    Objective     Vitals:   Temp:  [96.8 °F (36 °C)-99.7 °F (37.6 °C)] 98 °F (36.7 °C)  Heart Rate:  [107-128] 115  Resp:  [26-42] 26  BP: (127-150)/(65-82) 127/65  Flow (L/min):  [5] 5    Intake/Output Summary (Last 24 hours) at 9/23/2024 1800  Last data filed at 9/23/2024 0600  Gross per 24 hour   Intake 950 ml   Output 900 ml   Net 50 ml       Physical Exam:   Constitutional: Chronically ill and deconditioned with bilateral temporal wasting  HEENT: Sclera anicteric, poor oral hygiene  Neck: Supple, no thyromegaly, no lymphadenopathy, trachea at midline, no JVD  Respiratory: Fine crackles bibasal  Cardiovascular: Tachycardic  Gastrointestinal: Positive bowel sounds, abdomen is soft, nontender and nondistended  : No palpable bladder  Musculoskeletal: Severe edema        Scheduled Meds:     atorvastatin, 40 mg, Per PEG Tube, Daily  cefTRIAXone, 2,000 mg, Intravenous, Q24H  chlorhexidine, 15 mL, Mouth/Throat, Q12H  desmopressin (DDAVP) 1 mcg in sodium chloride 0.9 % 50 mL IVPB, 1 mcg, Intravenous, Once  enoxaparin, 40 mg, Subcutaneous, Q24H  guaifenesin, 200 mg, Per PEG Tube, Q6H  ipratropium-albuterol, 3 mL, Nebulization, 4x Daily - RT  levothyroxine, 25 mcg, Per PEG Tube, Q AM  pantoprazole, 40 mg, Intravenous, Q24H  potassium phosphate, 15 mmol, Intravenous, Q3H  senna-docusate sodium, 2 tablet, Per PEG Tube, BID  sodium hypochlorite, , Topical, Q12H  vancomycin, 1,000 mg, Intravenous, Q12H      IV Meds:   dexmedetomidine, 0.2-1.5 mcg/kg/hr, Last Rate: Stopped (09/21/24 0252)  norepinephrine, 0.02-0.3 mcg/kg/min, Last Rate: Stopped (09/21/24 0716)  Pharmacy to dose vancomycin,   vasopressin, 0.03 Units/min, Last Rate: Stopped (09/18/24 1200)        Results Reviewed:   I have personally reviewed the results from the time of this admission to 9/23/2024 18:00 EDT     Lab Results   Component Value Date    GLUCOSE  130 (H) 09/23/2024    CALCIUM 8.5 (L) 09/23/2024     (H) 09/23/2024    K 3.9 09/23/2024    CO2 25.9 09/23/2024     (H) 09/23/2024    BUN 17 09/23/2024    CREATININE 0.43 (L) 09/23/2024    EGFRIFAFRI 84 03/27/2018    EGFRIFNONA 69 03/27/2018    BCR 39.5 (H) 09/23/2024    ANIONGAP 3.1 (L) 09/23/2024      Lab Results   Component Value Date    MG 2.0 09/23/2024    PHOS 0.3 (C) 09/23/2024    ALBUMIN 1.7 (L) 09/23/2024           Assessment / Plan       Pneumonia of both lungs due to methicillin resistant Staphylococcus aureus (MRSA)    Ankylosing spondylitis    Essential hypertension    Immobility    DISH (disseminated idiopathic skeletal hyperostosis)    GERD without esophagitis    Dysphagia    Failure to thrive in adult    Anemia    Indwelling Mariscal catheter present    Feeding by G-tube    Hyperlipidemia    Hypothyroidism (acquired)    Sacral wound    Septic shock    Pneumonia, aspiration    Acute respiratory failure with hypoxia and hypercapnia    NSTEMI (non-ST elevated myocardial infarction)    Subacute osteomyelitis of left foot    Hypokalemia    Hypophosphatemia    Hypernatremia      ASSESSMENT:    -Hypernatremia with calculated water deficit 4.2 L.  Unfortunately with severe anasarca, third spacing from malnutrition.  With decreased effective intravascular volume.  Unfortunately attempt to provide IV fluids likely will decompensate his weak respiratory pattern and will require repeat intubation.  I will attempt to hold his feeds that could be a contributor for osmotic diuresis continue low rate free water flushes via PEG tube and to avoid any extra fluids.  Will attempt to decrease free water excretion by trial of desmopressin.  -History of septic shock secondary to bilateral aspiration pneumonia due to MRSA and E. coli with hypoxic respiratory failure followed by pulmonary and primary team regarding the patient mechanical support extubated 9/20/2024  -Multiple sacral pressure ulcers followed by wound  care and primary team  -Chronic immobility syndrome with dysphagia and PEG tube placement with malnutrition followed by dietary  -Ankylosing spondylitis previously on methotrexate currently on hold likely will not have a significant long-term benefit on his mobility with high risk of complications and agree with primary team with discontinuing long-term    PLAN:  -Patient unfortunately with significant third spacing and anasarca for malnutrition with decreased effective intravascular volume.  Unfortunately aggressive free water replacement will push him into respiratory distress and reintubation will attempt gentle free water.  Replacement by PEG holding feeds for concern of osmotic diuresis and trial of DDAVP to decrease free water excretion  -Will continue to follow sodium closely  -Patient overall with poor prognosis    Discussed with nursing staff at bedside    Thank you for involving us in the care of Anthony Gallegos.  Please feel free to call with any questions.    Kishan Woo MD  09/23/24  18:00 EDT    [facility]  [phone]      Please note that portions of this note were completed with a voice recognition program.

## 2024-09-23 NOTE — PLAN OF CARE
Goal Outcome Evaluation:            Patient was calm throughout this shift, his HR still runs Tachy - 110s. He had bowel movement today with great urinary output. He was hypoglycemic with BGC of 61mg/dl. 50DW was given IV push, BGC was rechecked 15 minutes  latter and it namita to 142. Incentive spirometer was commenced, he requires encouragement to perform incentive spirometer. He is still on IV antibiotics.

## 2024-09-24 NOTE — PAYOR COMM NOTE
"Anthony Maravilla (79 y.o. Male)                   ATTENTION; CLINICALS CASE [id number] PER REQUEST ,                   REPLY TO [facility] DEPT  [phone]           Date of Birth   1945    Social Security Number       Address   [address] [place] [address]    Home Phone   [phone]    MRN   [id number]       Buddhism   Taoism    Marital Status                               Admission Date   9/16/24    Admission Type   Emergency    Admitting Provider   Livier Vegas MD    Attending Provider   Livier Vegas MD    Department, Room/Bed   [facility] CORONARY [facility], N331/1       Discharge Date       Discharge Disposition       Discharge Destination                                 Attending Provider: Livier Vegas MD    Allergies: No Known Allergies    Isolation: Contact   Infection: MRSA (09/17/24)   Code Status: CPR    Ht: 190.5 cm (75\")   Wt: 85 kg (187 lb 6.3 oz)    Admission Cmt: None   Principal Problem: Pneumonia of both lungs due to methicillin resistant Staphylococcus aureus (MRSA) [J15.212]                   Active Insurance as of 9/16/2024       Primary Coverage       Payor Plan Insurance Group Employer/Plan Group    AETNA MEDICARE REPLACEMENT AETNA MED ADV POS [id number]       Payor Plan Address Payor Plan Phone Number Payor Plan Fax Number Effective Dates    [address] [phone]  12/1/2023 - None Entered    [facility] [id number]         Subscriber Name Subscriber Birth Date Member ID       ANTHONY MARAVILLA 1945 [id number]                     Emergency Contacts        (Rel.) Home Phone Work Phone Mobile Phone    NATHALIE DON (Daughter) [phone] -- [phone]    MAGY MARAVILLA (Son) [phone] -- [phone]              Vital Signs (last day)       Date/Time Temp Temp src Pulse Resp BP Patient Position SpO2    09/24/24 0740 97.5 (36.4) Axillary -- 20 135/71 Lying --    09/24/24 0435 97.4 (36.3) Axillary 110 12 " 150/80 Lying 93    09/24/24 0021 -- -- 112 32 -- -- 94    09/24/24 0004 97.7 (36.5) Oral 109 42 157/79 Lying 92    09/23/24 2106 -- -- 113 32 -- -- 97    09/23/24 1901 98.2 (36.8) Oral 113 36 176/86 Lying 98    09/23/24 1729 -- -- 115 -- -- -- 95    09/23/24 1725 -- -- 116 26 -- -- 93    09/23/24 1500 98 (36.7) Oral -- -- -- -- --    09/23/24 1105 96.8 (36) Axillary 107 32 -- -- 96    09/23/24 0719 97.5 (36.4) Axillary 113 33 -- -- 95    09/23/24 0337 -- -- -- 33 127/65 Lying --    09/23/24 0318 -- -- 116 33 -- -- 93    09/23/24 0018 -- -- 122 33 -- -- 91          Oxygen Therapy (last day)       Date/Time SpO2 Device (Oxygen Therapy) Flow (L/min) Oxygen Concentration (%) ETCO2 (mmHg)    09/24/24 0435 93 -- -- -- --    09/24/24 0022 -- -- -- 30 --    09/24/24 0021 94 NPPV/NIV -- 30 --    09/24/24 0004 92 -- -- -- --    09/23/24 2106 97 nasal cannula 5 40 --    09/23/24 2000 -- nasal cannula 5 -- --    09/23/24 1901 98 -- -- -- --    09/23/24 1729 95 -- -- -- --    09/23/24 1725 93 nasal cannula 5 -- --    09/23/24 1105 96 nasal cannula 5 -- --    09/23/24 0807 -- nasal cannula 5 -- --    09/23/24 0719 95 NPPV/NIV -- 30 --    09/23/24 0318 93 NPPV/NIV -- 30 --    09/23/24 0018 91 NPPV/NIV -- 30 --          Lines, Drains & Airways       Active LDAs       Name Placement date Placement time Site Days    Peripheral IV 09/18/24 1620 Anterior;Left;Upper Arm 09/18/24  1620  Arm  5    Peripheral IV 09/21/24 1910 Anterior;Right;Upper Arm 09/21/24 1910  Arm  2    Gastrostomy/Enterostomy Percutaneous endoscopic gastrostomy (PEG) 20 Fr. LUQ 03/29/24  0715  LUQ  179    Urethral Catheter 09/17/24  0108  -- 7                  Current Facility-Administered Medications   Medication Dose Route Frequency Provider Last Rate Last Admin    acetaminophen (TYLENOL) tablet 650 mg  650 mg Per PEG Tube Q4H PRN Dez Thakkar MD   650 mg at 09/23/24 0541    Or    acetaminophen (TYLENOL) suppository 650 mg  650 mg Rectal Q4H PRN Bijan  Dez Weir MD        albumin human 25 % IV SOLN 25 g  25 g Intravenous Q6H Kishan Woo MD   25 g at 09/24/24 0834    atorvastatin (LIPITOR) tablet 40 mg  40 mg Per PEG Tube Daily Dez Thakkar MD   40 mg at 09/24/24 0842    sennosides-docusate (PERICOLACE) 8.6-50 MG per tablet 2 tablet  2 tablet Per PEG Tube BID Dez Thakkar MD   2 tablet at 09/24/24 0841    And    polyethylene glycol (MIRALAX) packet 17 g  17 g Per PEG Tube Daily PRN Dez Thakkar MD        And    bisacodyl (DULCOLAX) EC tablet 5 mg  5 mg Oral Daily PRN Dez Thakkar MD        And    bisacodyl (DULCOLAX) suppository 10 mg  10 mg Rectal Daily PRN Dez Thakkar MD        Calcium Replacement - Follow Nurse / BPA Driven Protocol   Does not apply PRN Dez Thakkar MD        cefTRIAXone (ROCEPHIN) 2,000 mg in sodium chloride 0.9 % 100 mL MBP  2,000 mg Intravenous Q24H Dez Thakkar  mL/hr at 09/23/24 1503 2,000 mg at 09/23/24 1503    chlorhexidine (PERIDEX) 0.12 % solution 15 mL  15 mL Mouth/Throat Q12H Dez Thakkar MD   15 mL at 09/24/24 0842    dexmedetomidine (PRECEDEX) 400 mcg in 100 mL NS infusion  0.2-1.5 mcg/kg/hr Intravenous Titrated Dez Thakkar MD   Stopped at 09/21/24 0252    dextrose (D50W) (25 g/50 mL) IV injection 25 g  25 g Intravenous Q15 Min PRN Dez Thakkar MD   25 g at 09/24/24 0622    dextrose (D5W) 5 % infusion 100 mL  100 mL Intravenous Q6H Kishan Woo MD        dextrose (GLUTOSE) oral gel 15 g  15 g Oral Q15 Min PRN Dez Thakkar MD        Enoxaparin Sodium (LOVENOX) syringe 40 mg  40 mg Subcutaneous Q24H Dez Thakkar MD   40 mg at 09/23/24 1509    furosemide (LASIX) injection 20 mg  20 mg Intravenous Q12H Kishan Woo MD   20 mg at 09/24/24 0839    glucagon (GLUCAGEN) injection 1 mg  1 mg Subcutaneous Q15 Min PRN Dez Thakkar MD        guaifenesin (ROBITUSSIN) 100 MG/5ML liquid 200 mg  200 mg Per PEG  Tube Q6H Dez Thakkar MD   200 mg at 09/24/24 0842    ipratropium-albuterol (DUO-NEB) nebulizer solution 3 mL  3 mL Nebulization 4x Daily - RT Dez Thakkar MD   3 mL at 09/23/24 2106    levothyroxine (SYNTHROID, LEVOTHROID) tablet 25 mcg  25 mcg Per PEG Tube Q AM Dez Thakkar MD   25 mcg at 09/24/24 0624    Magnesium Standard Dose Replacement - Follow Nurse / BPA Driven Protocol   Does not apply PRN Dez Thakkar MD        nitroglycerin (NITROSTAT) SL tablet 0.4 mg  0.4 mg Sublingual Q5 Min PRN Dez Thakkar MD        norepinephrine (LEVOPHED) 8 mg in 250 mL NS infusion (premix)  0.02-0.3 mcg/kg/min Intravenous Titrated Dez Thakkar MD   Stopped at 09/21/24 0716    pantoprazole (PROTONIX) injection 40 mg  40 mg Intravenous Q24H Dez Thakkar MD   40 mg at 09/24/24 0838    Pharmacy to dose vancomycin   Does not apply Continuous PRN Uche Toribio MD        Phosphorus Replacement - Follow Nurse / BPA Driven Protocol   Does not apply PRN Dez Thakkar MD        potassium chloride 10 mEq in 100 mL IVPB  10 mEq Intravenous Q1H Livier Vegas  mL/hr at 09/24/24 0838 10 mEq at 09/24/24 0838    potassium phosphate 15 mmol in 0.9% normal saline 250 mL IVPB  15 mmol Intravenous Once Livier Vegas MD        Potassium Replacement - Follow Nurse / BPA Driven Protocol   Does not apply PRN Dez Thakkar MD        sodium hypochlorite (DAKIN'S 1/4 STRENGTH) 0.125 % topical solution 0.125% solution   Topical Q12H Dez Thakkar MD   Given at 09/23/24 2039    vancomycin (VANCOCIN) 1,000 mg in sodium chloride 0.9 % 250 mL IVPB-VTB  1,000 mg Intravenous Q12H Uche Toribio  mL/hr at 09/23/24 2351 1,000 mg at 09/23/24 2351    vasopressin (PITRESSIN) 20 units in 100 mL NS infusion  0.03 Units/min Intravenous Continuous Dez Thakkar MD   Stopped at 09/18/24 1200     Orders (last 24 hrs)        Start     Ordered     09/24/24 1636  Phosphorus  Timed         09/24/24 0635    09/24/24 1634  Potassium  Timed         09/24/24 0633    09/24/24 1000  Osmolality, Urine - Urine, Clean Catch  Once         09/23/24 2245 09/24/24 1000  Sodium, Urine, Random - Urine, Clean Catch  Once         09/23/24 2245 09/24/24 0900  dextrose (D5W) 5 % infusion 100 mL  Every 6 Hours         09/24/24 0814    09/24/24 0900  albumin human 25 % IV SOLN 25 g  Every 6 Hours         09/24/24 0814    09/24/24 0900  furosemide (LASIX) injection 20 mg  Every 12 Hours         09/24/24 0814    09/24/24 0816  Tube Feeding: Formula: Impact Peptide 1.5 (Pivot 1.5); Feeding Type: Continuous; Start at: 30 mL/hr; Then Advance By: 10 mL/hr; Every: 4 hours; To Goal Rate of: 75 mL/hr; Water Flush: 50 mL; Every: 1 hour; Water Bolus: None  Diet Effective Now         09/24/24 0815    09/24/24 0814  Tube Feeding: Formula: Impact Peptide 1.5 (Pivot 1.5); Feeding Type: Continuous; Start at: 50 mL/hr; Then Advance By: 10 mL/hr; Every: 4 hours; To Goal Rate of: 75 mL/hr; Water Flush: 50 mL; Every: 1 hour; Water Bolus: None  Diet Effective Now,   Status:  Canceled         09/24/24 0814    09/24/24 0812  Protein / Creatinine Ratio, Urine - Urine, Clean Catch  Once         09/24/24 0811    09/24/24 0810  Tube Feeding: Formula: Nutren 2.0 (TwoCal); Feeding Type: Continuous; Start at: 50 mL/hr; Then Advance By: 10 mL/hr; Every: 4 hours; To Goal Rate of: 75 mL/hr; Water Flush: 50 mL; Every: 1 hour; Water Bolus: None  Diet Effective Now,   Status:  Canceled         09/24/24 0809    09/24/24 0810  Hold Tube Feeding  Until Discontinued,   Status:  Canceled        Comments: HOLD UNTIL OKAY WITH NEPHROLOGY    09/24/24 0810    09/24/24 0806  Ok to resume feeds . From nephrology . Thank you  Nursing Communication  Once        Comments: Ok to resume feeds . From nephrology . Thank you    09/24/24 0805    09/24/24 0730  potassium chloride 10 mEq in 100 mL IVPB  Every 1 Hour         09/24/24  0633    09/24/24 0730  potassium phosphate 15 mmol in 0.9% normal saline 250 mL IVPB  Once         09/24/24 0635    09/24/24 0633  POC Glucose Once  PROCEDURE ONCE        Comments: Complete no more than 45 minutes prior to patient eating      09/24/24 0631    09/24/24 0623  POC Glucose Once  PROCEDURE ONCE        Comments: Complete no more than 45 minutes prior to patient eating      09/24/24 0620    09/24/24 0618  POC Glucose Once  PROCEDURE ONCE        Comments: Complete no more than 45 minutes prior to patient eating      09/24/24 0022    09/24/24 0617  POC Glucose Once  PROCEDURE ONCE        Comments: Complete no more than 45 minutes prior to patient eating      09/23/24 1557    09/24/24 0600  Comprehensive Metabolic Panel  Morning Draw         09/23/24 1033    09/24/24 0600  CBC (No Diff)  Morning Draw         09/23/24 1033    09/24/24 0600  Magnesium  Morning Draw         09/23/24 1033    09/24/24 0600  Phosphorus  Morning Draw,   Status:  Canceled         09/23/24 1033    09/24/24 0600  Osmolality, Serum  Morning Draw         09/23/24 2246    09/24/24 0416  Phosphorus  Timed         09/23/24 1215    09/24/24 0009  POC Glucose Once  PROCEDURE ONCE        Comments: Complete no more than 45 minutes prior to patient eating      09/24/24 0007    09/23/24 2345  gadobenate dimeglumine (MULTIHANCE) injection 20 mL  Once in Imaging         09/23/24 2258    09/23/24 2000  desmopressin (DDAVP) 1 mcg in sodium chloride 0.9 % 50 mL IVPB  Once         09/23/24 1800    09/23/24 1759  Please hold nutren feeds and continue only free water 50 ml q 1 hour , please for 24 hours . Thank you Nephrology  Nursing Communication  Once        Comments: Please hold nutren feeds and continue only free water 50 ml q 1 hour , please for 24 hours . Thank you     Nephrology    09/23/24 1759    09/23/24 1351  Inpatient Nephrology Consult  STAT        Specialty:  Nephrology  Provider:  Thompson Novoa MD    09/23/24 1350    09/23/24 131  " potassium phosphate 15 mmol in 0.9% normal saline 250 mL IVPB  Every 3 Hours         09/23/24 1215    09/23/24 1149  POC Glucose Once  PROCEDURE ONCE        Comments: Complete no more than 45 minutes prior to patient eating      09/23/24 1145    09/23/24 1130  MRI Brain With & Without Contrast  1 Time Imaging         09/23/24 1131    09/23/24 1033  Inpatient Neurology Consult General  Once        Specialty:  Neurology  Provider:  Phillip Salas MD    09/23/24 1033    09/23/24 0600  Basic Metabolic Panel  Daily       09/22/24 1054    09/22/24 1000  Incentive Spirometry  Every 2 Hours While Awake       09/22/24 0822    09/22/24 0830  Oscillating Positive Expiratory Pressure (OPEP)  4 Times Daily - RT       09/22/24 0822    09/20/24 1608  Wound Care  Daily       09/20/24 1607    09/20/24 1200  cefTRIAXone (ROCEPHIN) 2,000 mg in sodium chloride 0.9 % 100 mL MBP  Every 24 Hours         09/20/24 1052    09/19/24 1215  dexmedetomidine (PRECEDEX) 400 mcg in 100 mL NS infusion  Titrated         09/19/24 1124    09/19/24 0537  Potassium Replacement - Follow Nurse / BPA Driven Protocol  As Needed         09/19/24 0537    09/19/24 0537  Magnesium Standard Dose Replacement - Follow Nurse / BPA Driven Protocol  As Needed         09/19/24 0537    09/19/24 0537  Phosphorus Replacement - Follow Nurse / BPA Driven Protocol  As Needed         09/19/24 0537    09/19/24 0537  Calcium Replacement - Follow Nurse / BPA Driven Protocol  As Needed         09/19/24 0537    09/18/24 2100  sennosides-docusate (PERICOLACE) 8.6-50 MG per tablet 2 tablet  2 Times Daily        Placed in \"And\" Linked Group    09/18/24 0953    09/18/24 1800  POC Glucose Q6H  Every 6 Hours      Comments: Complete no more than 45 minutes prior to patient eating      09/18/24 1646    09/18/24 1800  Dietary Nutrition Supplements Erich  2 Times Daily      Comments: 1 pkg bolus BID via PEG, flush with water before and after    09/18/24 1646    09/18/24 1647 " " Daily Weights  Daily       09/18/24 1646    09/18/24 1644  Tube Feeding Assessment and I/O  Every Shift       09/18/24 1646    09/18/24 1045  vancomycin (VANCOCIN) 1,000 mg in sodium chloride 0.9 % 250 mL IVPB-VTB  Every 12 Hours         09/18/24 0949    09/18/24 0952  acetaminophen (TYLENOL) suppository 650 mg  Every 4 Hours PRN        Placed in \"Or\" Linked Group    09/18/24 0953    09/18/24 0952  acetaminophen (TYLENOL) tablet 650 mg  Every 4 Hours PRN        Placed in \"Or\" Linked Group    09/18/24 0953    09/18/24 0952  bisacodyl (DULCOLAX) suppository 10 mg  Daily PRN        Placed in \"And\" Linked Group    09/18/24 0953    09/18/24 0952  polyethylene glycol (MIRALAX) packet 17 g  Daily PRN        Placed in \"And\" Linked Group    09/18/24 0953    09/18/24 0952  bisacodyl (DULCOLAX) EC tablet 5 mg  Daily PRN        Placed in \"And\" Linked Group    09/18/24 0953    09/17/24 2100  sodium hypochlorite (DAKIN'S 1/4 STRENGTH) 0.125 % topical solution 0.125% solution  Every 12 Hours Scheduled         09/17/24 1610    09/17/24 1633  Silicone Border Dressing to Bony Prominences  Every Shift       09/17/24 1633    09/17/24 1600  Enoxaparin Sodium (LOVENOX) syringe 40 mg  Every 24 Hours         09/17/24 1432    09/17/24 1331  dextrose (GLUTOSE) oral gel 15 g  Every 15 Minutes PRN         09/17/24 1331    09/17/24 1331  dextrose (D50W) (25 g/50 mL) IV injection 25 g  Every 15 Minutes PRN         09/17/24 1331    09/17/24 1331  glucagon (GLUCAGEN) injection 1 mg  Every 15 Minutes PRN         09/17/24 1331    09/17/24 0900  pantoprazole (PROTONIX) injection 40 mg  Every 24 Hours Scheduled         09/17/24 0222    09/17/24 0900  atorvastatin (LIPITOR) tablet 40 mg  Daily         09/17/24 0229    09/17/24 0830  ipratropium-albuterol (DUO-NEB) nebulizer solution 3 mL  4 Times Daily - RT         09/17/24 0244    09/17/24 0800  levothyroxine (SYNTHROID, LEVOTHROID) tablet 25 mcg  Every Early Morning         09/17/24 0229 09/17/24 " 0800  guaifenesin (ROBITUSSIN) 100 MG/5ML liquid 200 mg  Every 6 Hours         09/17/24 0357    09/17/24 0623  sodium chloride 0.9 % infusion  Continuous,   Status:  Discontinued         09/17/24 0607    09/17/24 0606  Pharmacy to dose vancomycin  Continuous PRN         09/17/24 0606    09/17/24 0400  Oral Care & Teeth Brushing - Intubated Patient  Every 4 Hours      Comments: Leonard Teeth at Least 2x/day    09/17/24 0222    09/17/24 0238  chlorhexidine (PERIDEX) 0.12 % solution 15 mL  Every 12 Hours Scheduled         09/17/24 0222    09/17/24 0200  Vital Signs Every Hour and Per Hospital Policy Based on Patient Condition  Every Hour       09/17/24 0112    09/17/24 0200  Intake & Output  Every Hour       09/17/24 0112    09/17/24 0113  Daily Weights  Daily       09/17/24 0112    09/17/24 0109  nitroglycerin (NITROSTAT) SL tablet 0.4 mg  Every 5 Minutes PRN         09/17/24 0112    09/17/24 0013  vasopressin (PITRESSIN) 20 units in 100 mL NS infusion  Continuous         09/16/24 2357    09/16/24 2314  norepinephrine (LEVOPHED) 8 mg in 250 mL NS infusion (premix)  Titrated         09/16/24 2258    Unscheduled  Follow Hypoglycemia Standing Orders For Blood Glucose <70 & Notify Provider of Treatment  As Needed      Comments: Follow Hypoglycemia Orders As Outlined in Process Instructions (Open Order Report to View Full Instructions)  Notify Provider Any Time Hypoglycemia Treatment is Administered    09/17/24 1331    Unscheduled  Wound Care  As Needed      Comments: Apply Z Guard    09/17/24 1633    Unscheduled  Dez & Document Feeding Tube Depth (in cm)  As Needed       09/18/24 1646    Unscheduled  Verify Feeding Tube Placement Upon Insertion & As Needed  As Needed       09/18/24 1646    Unscheduled  Flush Feeding Tube With 30-50mL Water As Needed  As Needed       09/18/24 1646    --  sodium hypochlorite (DAKIN'S 1/4 STRENGTH) 0.125 % solution topical solution 0.125%  3 times daily         09/17/24 0945    --  Potassium  Bicarb-Citric Acid (Effer-K) 20 MEQ effervescent tablet  Daily         24 0945    --  HYDROcodone-acetaminophen (NORCO) 5-325 MG per tablet  3 Times Daily PRN         24 0945    --  Wound Dressings (Medihoney Wound/Burn Dressing) Paste  Daily         24 0949    --  collagenase 250 UNIT/GM ointment  Daily         24 0949                  Operative/Procedure Notes (most recent note)    No notes of this type exist for this encounter.          Physician Progress Notes (last 24 hours)        Kishan Woo MD at 24 0805              [facility] Progress Note      Patient Name: Anthony Gallegos  : 1945  MRN: [id number]  Primary Care Physician:  Keshav Eason MD  Date of admission: 2024    Subjective     Interval History:     Overnight no event    Patient sitting in bed on supplemental oxygen  Dyspneic at rest   Poorly interactive  offering no complaint      Review of Systems:   As noted above    Objective     Vitals:   Temp:  [96.8 °F (36 °C)-98.2 °F (36.8 °C)] 97.5 °F (36.4 °C)  Heart Rate:  [107-116] 110  Resp:  [12-42] 20  BP: (135-176)/(71-86) 135/71  Flow (L/min):  [5] 5    Intake/Output Summary (Last 24 hours) at 2024 0806  Last data filed at 2024 1856  Gross per 24 hour   Intake --   Output 650 ml   Net -650 ml       Physical Exam:      Constitutional: Chronically ill and deconditioned with bilateral temporal wasting  HEENT: Sclera anicteric, poor oral hygiene  Neck: Supple, no thyromegaly, no lymphadenopathy, trachea at midline, no JVD  Respiratory: Fine crackles bibasal  Cardiovascular: Tachycardic  Gastrointestinal: Positive bowel sounds, abdomen is soft, nontender and nondistended  : No palpable bladder  Musculoskeletal: Severe edema    Scheduled Meds:     atorvastatin, 40 mg, Per PEG Tube, Daily  cefTRIAXone, 2,000 mg, Intravenous, Q24H  chlorhexidine, 15 mL, Mouth/Throat, Q12H  enoxaparin, 40 mg, Subcutaneous,  Q24H  guaifenesin, 200 mg, Per PEG Tube, Q6H  ipratropium-albuterol, 3 mL, Nebulization, 4x Daily - RT  levothyroxine, 25 mcg, Per PEG Tube, Q AM  pantoprazole, 40 mg, Intravenous, Q24H  potassium chloride, 10 mEq, Intravenous, Q1H  potassium phosphate, 15 mmol, Intravenous, Once  senna-docusate sodium, 2 tablet, Per PEG Tube, BID  sodium hypochlorite, , Topical, Q12H  vancomycin, 1,000 mg, Intravenous, Q12H      IV Meds:   dexmedetomidine, 0.2-1.5 mcg/kg/hr, Last Rate: Stopped (09/21/24 0252)  norepinephrine, 0.02-0.3 mcg/kg/min, Last Rate: Stopped (09/21/24 0716)  Pharmacy to dose vancomycin,   vasopressin, 0.03 Units/min, Last Rate: Stopped (09/18/24 1200)        Results Reviewed:   I have personally reviewed the results from the time of this admission to 9/24/2024 08:06 EDT     Results from last 7 days   Lab Units 09/24/24  0421 09/23/24  0948 09/22/24  2142 09/22/24  0814 09/20/24  1122 09/20/24  0728   SODIUM mmol/L 154* 154*  --   --   --  147*   POTASSIUM mmol/L 3.5 3.9 4.0 3.6   < > 2.7*   CHLORIDE mmol/L 121* 125*  --   --   --  117*   CO2 mmol/L 27.0 25.9  --   --   --  24.1   BUN mg/dL 20 17  --   --   --  11   CREATININE mg/dL 0.40* 0.43*  --  0.42*   < > 0.38*   CALCIUM mg/dL 8.3* 8.5*  --   --   --  7.7*   BILIRUBIN mg/dL 0.5 0.4  --   --   --   --    ALK PHOS U/L 171* 177*  --   --   --   --    ALT (SGPT) U/L 24 27  --   --   --   --    AST (SGOT) U/L 39 45*  --   --   --   --    GLUCOSE mg/dL 80 130*  --   --   --  126*    < > = values in this interval not displayed.       Estimated Creatinine Clearance: 177.1 mL/min (A) (by C-G formula based on SCr of 0.4 mg/dL (L)).    Results from last 7 days   Lab Units 09/24/24 0421 09/23/24  0948 09/21/24  1902   MAGNESIUM mg/dL 2.0 2.0 1.8   PHOSPHORUS mg/dL 1.8* 0.3*  --              Results from last 7 days   Lab Units 09/24/24  0421 09/23/24  0948 09/20/24  0728 09/19/24  0446 09/18/24  0709   WBC 10*3/mm3 13.94* 14.91* 12.56* 15.38* 13.50*   HEMOGLOBIN  g/dL 8.7* 8.5* 8.5* 8.2* 8.3*   PLATELETS 10*3/mm3 242 219 241 252 248             Assessment / Plan     ASSESSMENT:    -Hypernatremia with calculated water deficit > 4  L.  Unfortunately with severe anasarca from  third spacing from malnutrition.  With decreased effective intravascular volume.  Unfortunately attempt to provide IV fluids likely will decompensate his weak respiratory pattern w HIGH risk of PULMONARY EDEMA . .  Continue PEG tube water flushes  -History of nephrotic syndrome earlier this year UPCR 8.4 and 3.7 g will repeat likely contributing factor for volume overload  -History of septic shock secondary to bilateral aspiration pneumonia due to MRSA and E. coli with hypoxic respiratory failure followed by pulmonary and primary team regarding the patient mechanical support extubated 9/20/2024  -Multiple sacral pressure ulcers followed by wound care and primary team  -Chronic immobility syndrome with dysphagia and PEG tube placement with malnutrition followed by dietary  -Ankylosing spondylitis previously on methotrexate currently on hold likely will not have a significant long-term benefit on his mobility with high risk of complications and agree with primary team with discontinuing long-term    PLAN:    -Challenging case due to massive volume overload while intravascular depleted  -Patient unfortunately with significant third spacing and  anasarca from  malnutrition with decreased effective intravascular volume with over 4 L of water deficitw .    -Unfortunately the patient cannot tolerate significant IV fluids due to the risk of respiratory stress and high risk of pulmonary edema  -Will attempt small boluses of D5 with albumin  and low dose furosemide and will  evaluate clinical response   - Ok to resumed feed , not difference on sodium   -Will continue to follow sodium closely  -Patient overall with poor prognosis    Thank you for involving us in the care of Anthony Gallegos.  Please feel free to call  "with any questions.    Kishan Woo MD  24  08:06 EDT    [facility]  [phone]    Please note that portions of this note were completed with a voice recognition program.      Electronically signed by Kishan Woo MD at 24 0811       Walter Mancia MD at 24 1426            Enter Query Response Below      Query Response: Stage 4 Pressure Injury to Coccyx; Unstageable pressure injury to left heel, right lateral foot , right and left scapula, left gluteal;              If applicable, please update the problem list.   Patient: Anthony Gallegos        : 1945  Account: [id number]           Admit Date:         How to Respond to this query:       a. Click New Note     b. Answer query within the yellow box.                c. Update the Problem List, if applicable.      If you have any questions about this query contact me at: [email or web address]      Dr. Mancia    79-year male patient with PMHx that includes multiple pressure wounds, left calcaneal osteomyelitis, status post debridement of sacral and left foot wound () admitted 24 with septic shock, bilateral aspiration pneumonia, acute hypoxic/hypercapnic respiratory failure requiring intubation with mechanical ventilation, type 2 NSTEMI, multiple pressure wounds, severe malnutrition, immobility.     24  Wound nurse assessment states,  Side \" Left\" Location \" gluteal\" pressure injury stage \" U\"   Location : Coccyx\"  Pressure injury stage \" 4\"   Side \" Left \" Location  \"scapula \"  Pressure injury stage \" U\"   Side \" Right \"  Location \"scapula\" Pressure injury stage \"U\"  \"Right distal leg pressure injury \"  Pressure Injury Stage \" DTPI\"   \"Right lateral foot Pressure Injury \"  Pressure Injury Stage \" U\"   \"Right posterior greater trochanter Pressure Injury\" Pressure Injury Stage \" DTPI\"   \" Right ischial tuberosity Pressure Injury\"  Pressure Injury Stage \" DTPI\"   Side \" Left \" Location " "\" heel \" Primary Wound Type \" Pressure Injury \" Pressure Injury Stage \" U\"     Wound photos available to view in EPIC.Treatment has included Dakin's dressing changes, specialty mattress.     Please clarify if the patient was treated/monitored for:  > Stage 4 Pressure Injury to Coccyx; Unstageable pressure injury to left heel, right lateral foot , right and left scapula, left gluteal;   Deep tissue pressure injury to right distal leg, right ischial tuberosity , right greater trochanter  > Other- specify______    By submitting this query, we are merely seeking further clarification of documentation to accurately reflect all conditions that you are monitoring, evaluating, treating or that extend the hospitalization or utilize additional resources of care. Please utilize your independent clinical judgment when addressing the question(s) above.     This query and your response, once completed, will be entered into the legal medical record.    Sincerely,  Radha Padilla RN, CCDS  Clinical Documentation Integrity Program   [email or web address]     Electronically signed by Walter Mancia MD at 24 1426       Walter Mancia MD at 24 1011              Name: Anthony Gallgeos ADMIT: 2024   : 1945  PCP: Keshav Eason MD    MRN: [id number] LOS: 6 days   AGE/SEX: 79 y.o. male  [facility]OM[facility]     Subjective   Subjective   Pt still unable to answer any questions this AM. Speech unintelligible. Less upper airway congestion.      Objective   Objective   Vital Signs  Temp:  [97.5 °F (36.4 °C)-99.7 °F (37.6 °C)] 97.5 °F (36.4 °C)  Heart Rate:  [109-128] 113  Resp:  [22-42] 33  BP: (127-162)/(65-82) 127/65  SpO2:  [90 %-95 %] 95 %  on  Flow (L/min):  [5] 5;   Device (Oxygen Therapy): NPPV/NIV  Body mass index is 23.42 kg/m².  Physical Exam  Vitals and nursing note reviewed.   Constitutional:       General: He is not in acute distress.     Appearance: He is ill-appearing. He is not toxic-appearing or diaphoretic. "   HENT:      Head: Normocephalic.      Nose: Nose normal.      Mouth/Throat:      Mouth: Mucous membranes are dry.      Pharynx: Oropharynx is clear.   Eyes:      General: No scleral icterus.        Right eye: No discharge.         Left eye: No discharge.      Conjunctiva/sclera: Conjunctivae normal.   Cardiovascular:      Rate and Rhythm: Normal rate and regular rhythm.      Pulses: Normal pulses.   Pulmonary:      Effort: Pulmonary effort is normal. No respiratory distress.      Breath sounds: Normal breath sounds. No wheezing or rales.      Comments: Anteriorly   Abdominal:      General: Bowel sounds are normal. There is no distension.      Palpations: Abdomen is soft.      Tenderness: There is no abdominal tenderness.      Comments: PEG looks fine   Genitourinary:     Comments: Yellow urine in forde bag  Musculoskeletal:         General: Swelling (trace in all extremities) present.      Cervical back: Neck supple.      Comments: Heel boots in place  SCDs in place  Dressing to left heel   Skin:     General: Skin is warm and dry.      Capillary Refill: Capillary refill takes less than 2 seconds.      Coloration: Skin is not jaundiced.   Neurological:      Mental Status: He is alert.      Cranial Nerves: No cranial nerve deficit.      Motor: No weakness.   Psychiatric:      Comments: Unable to assess       Results Review     I reviewed the patient's new clinical results.  Results from last 7 days   Lab Units 09/20/24  0728 09/19/24  0446 09/18/24  0709 09/17/24  1127   WBC 10*3/mm3 12.56* 15.38* 13.50* 14.72*   HEMOGLOBIN g/dL 8.5* 8.2* 8.3* 9.3*   PLATELETS 10*3/mm3 241 252 248 267     Results from last 7 days   Lab Units 09/22/24  2142 09/22/24  0814 09/21/24  1902 09/21/24  0710 09/20/24  1122 09/20/24  0728 09/19/24  2212 09/19/24  0446 09/18/24  0708 09/17/24  0516   SODIUM mmol/L  --   --   --   --   --  147*  --  145 140 132*   POTASSIUM mmol/L 4.0 3.6 3.3* 3.4*   < > 2.7*   < > 2.3* 3.1* 5.3*   CHLORIDE  mmol/L  --   --   --   --   --  117*  --  114* 109* 104   CO2 mmol/L  --   --   --   --   --  24.1  --  24.3 21.9* 17.0*   BUN mg/dL  --   --   --   --   --  11  --  10 17 23   CREATININE mg/dL  --  0.42*  --  0.36*  --  0.38*  --  0.41* 0.51* 0.84   GLUCOSE mg/dL  --   --   --   --   --  126*  --  92 63* 84   EGFR mL/min/1.73  --  108.7  --  113.9  --  112.0  --  109.5 102.5 88.2    < > = values in this interval not displayed.     Results from last 7 days   Lab Units 09/16/24  2357   ALBUMIN g/dL 2.3*   BILIRUBIN mg/dL 0.4   ALK PHOS U/L 70   AST (SGOT) U/L 27   ALT (SGPT) U/L 24     Results from last 7 days   Lab Units 09/21/24  1902 09/20/24  0728 09/19/24  0446 09/18/24  0708 09/17/24  0516 09/16/24  2357   CALCIUM mg/dL  --  7.7* 7.3* 7.7* 8.3* 9.0   ALBUMIN g/dL  --   --   --   --   --  2.3*   MAGNESIUM mg/dL 1.8  --  1.6  --   --   --      Results from last 7 days   Lab Units 09/22/24  0814 09/18/24  0709 09/17/24  2308 09/17/24  1638 09/17/24  1127 09/17/24  0516 09/16/24  2357   PROCALCITONIN ng/mL 6.77*  --   --   --   --   --  68.40*   LACTATE mmol/L  --  2.0 2.7* 4.9* 5.2*   < > 6.3*    < > = values in this interval not displayed.     Glucose   Date/Time Value Ref Range Status   09/23/2024 0640 120 70 - 130 mg/dL Final   09/22/2024 2317 102 70 - 130 mg/dL Final   09/22/2024 2004 96 70 - 130 mg/dL Final   09/22/2024 1819 75 70 - 130 mg/dL Final   09/22/2024 1424 142 (H) 70 - 130 mg/dL Final   09/22/2024 1359 61 (L) 70 - 130 mg/dL Final   09/22/2024 0646 92 70 - 130 mg/dL Final       No radiology results for the last day    I have personally reviewed all medications:  Scheduled Medications  atorvastatin, 40 mg, Per PEG Tube, Daily  cefTRIAXone, 2,000 mg, Intravenous, Q24H  chlorhexidine, 15 mL, Mouth/Throat, Q12H  enoxaparin, 40 mg, Subcutaneous, Q24H  guaifenesin, 200 mg, Per PEG Tube, Q6H  ipratropium-albuterol, 3 mL, Nebulization, 4x Daily - RT  levothyroxine, 25 mcg, Per PEG Tube, Q AM  pantoprazole,  40 mg, Intravenous, Q24H  senna-docusate sodium, 2 tablet, Per PEG Tube, BID  sodium hypochlorite, , Topical, Q12H  vancomycin, 1,000 mg, Intravenous, Q12H    Infusions  dexmedetomidine, 0.2-1.5 mcg/kg/hr, Last Rate: Stopped (09/21/24 0252)  norepinephrine, 0.02-0.3 mcg/kg/min, Last Rate: Stopped (09/21/24 0716)  Pharmacy to dose vancomycin,   sodium chloride, 50 mL/hr, Last Rate: 50 mL/hr (09/23/24 0954)  vasopressin, 0.03 Units/min, Last Rate: Stopped (09/18/24 1200)    Diet  NPO Diet NPO Type: Tube Feeding    I have personally reviewed:  [x]  Laboratory   []  Microbiology   []  Radiology   [x]  EKG/Telemetry  []  Cardiology/Vascular   []  Pathology    []  Records      Assessment/Plan     Active Hospital Problems    Diagnosis  POA    **Pneumonia of both lungs due to methicillin resistant Staphylococcus aureus (MRSA) [J15.212]  Yes    Pneumonia, aspiration [J69.0]  Yes    Acute respiratory failure with hypoxia and hypercapnia [J96.01, J96.02]  Yes    NSTEMI (non-ST elevated myocardial infarction) [I21.4]  Yes    Subacute osteomyelitis of left foot [M86.272]  Yes    Hypokalemia [E87.6]  No    Septic shock [A41.9, R65.21]  Yes    Hypothyroidism (acquired) [E03.9]  Yes    Sacral wound [S31.000A]  Yes    Hyperlipidemia [E78.5]  Yes    Feeding by G-tube [Z93.1]  Not Applicable    Indwelling Forde catheter present [Z97.8]  Not Applicable    Anemia [D64.9]  Yes    Failure to thrive in adult [R62.7]  Yes    Dysphagia [R13.10]  Yes    GERD without esophagitis [K21.9]  Yes    DISH (disseminated idiopathic skeletal hyperostosis) [M48.10]  Yes    Immobility [Z74.09]  Yes    Essential hypertension [I10]  Yes    Ankylosing spondylitis [M45.9]  Yes      Resolved Hospital Problems   No resolved problems to display.       80yo gentleman with ankylosing spondylitis, HTN, BPH, chronic forde, h/o ESBL E.coli UTI, GERD, HLD, [place], ACD, hypoT4, O/P dysphagia chronically on tube feeds, and recent admission with multiple pressure wounds  and left calcaneal osteomyelitis, who presented to ER from SNF with altered mental status, hypotension, and acute hypoxic resp failure. He was intubated in ER and admitted to ICU with severe sepsis due to aspiration PNA. We were asked to assume care when pt transitioned out of ICU.     Septic shock  Bilateral aspiration PNA due to MRSA and E.coli  Acute hypoxic and hypercapnic resp failure  Mgmt per Pulm and ID  S/p pressors  Extubated 9/20  PCT down to 6.8 from 68 on admission  ID recommends continue Rocephin and Vanc through 9/24  Continue O2 and pulm hygiene efforts  Blood cultures NGTD, urinary Ag's neg, RVP neg  Resp culture grew MRSA and E.coli  No fever or hypotension currently, intermittent sinus tach noted    Type 2 NSTEMI  No ACS    Multiple pressure wounds  Left calcaneal osteomyelitis  WOCN following, d/w them this AM and they note all wounds appear worse compared to last admission, they do not feel any require debridement at this time but will continue to follow  Palliative wound care to left calcaneal wound/osteo per plan last admission from ID and Pod    Hypokalemia  Replaced  Checking Mg++ level    Immobility  Chronic forde  Dysphagia, PEG tube  Protein calorie malnutrition, severe  Continue PT  Good UOP  Continue tube feeds, Nutrition following  SLP recommends repeat VFSS when pt able to comply  Previous admission had been on modified diet but did not like to eat (only drank fluids) so his nutrition is primarily via tube feeds--suspect he will have to remain strict NPO from now on    Dysarthria  Speech is really unintelligible and that is new for him since last admissoin  Will ask Neuro to see    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  Full code.  Discussed with patient and RN. No family present.  Anticipate discharge  back to SNU facility, timing yet to be determined.  Expected Discharge Date: 2024; Expected Discharge Time:       Walter Mancia MD  [facility]  24  10:17 EDT      Electronically signed by Walter Mancia MD at 24 1032          Consult Notes (last 24 hours)        Kishan Woo MD at 24 1800        Consult Orders    1. Inpatient Nephrology Consult [[id number]] ordered by Walter Mancia MD at 24 1350                   [facility] Consult Note      Patient Name: Anthony Gallegos  : 1945  MRN: [id number]  Primary Care Physician:  Keshav Eason MD  Referring Physician: Livier Vegas MD  Date of admission: 2024    Subjective     Reason for Consult: Hypernatremia    HPI:   Anthony Gallegos is a 79 y.o. male with past medical history of ankylosing spondylitis, hypertension, failure to thrive, chronic decubitus wounds chronic dysphagia with PEG tube placement chronic normocytic anemia with severe malnutrition presents to the emerged department with altered mental status on 2024 where he was admitted for further management.     in the emergency department patient was found to have multilobar pneumonia that required intubation and mechanical support later extubated currently on broad- spectrum antibiotics with onset of hypernatremia    Patient with significant third spacing and anasarca with little intravascular volume   With significant water deficit    Nephrology consultation been requested for further management    Patient is unable to provide any significant information.  Gathered from patient's chart and nursing staff at bedside    Review of Systems:   14 point review of systems is otherwise negative except for mentioned above on HPI    Personal History     Past Medical History:   Diagnosis Date    Abscess of scrotum     MARTITA (acute kidney injury)     Altered mental state     Arthritis     AS (ankylosing spondylitis)      History of MRSA infection     Hypertension     Leukocytosis     Tetrahydrocannabinol (THC) use disorder, mild, abuse 12/20/2023    Uveitis        Past Surgical History:   Procedure Laterality Date    COLONOSCOPY      ENDOSCOPY W/ PEG TUBE PLACEMENT N/A 03/29/2024    Procedure: ESOPHAGOGASTRODUODENOSCOPY WITH PERCUTANEOUS ENDOSCOPIC GASTROSTOMY TUBE INSERTION;  Surgeon: Khadar Broderick MD;  Location: [facility] ENDOSCOPY;  Service: General;  Laterality: N/A;  PRE/POST - DYSPHAGIA    ROTATOR CUFF REPAIR Right     TOTAL HIP ARTHROPLASTY Left 2014    UPPER GASTROINTESTINAL ENDOSCOPY      WOUND DEBRIDEMENT N/A 08/20/2024    Procedure: DEBRIDEMENT SACRAL ULCER/WOUND;  Surgeon: Justine Roque MD;  Location: [facility] MAIN OR;  Service: General;  Laterality: N/A;    WOUND DEBRIDEMENT Left 08/20/2024    Procedure: LEFT DEBRIDEMENT FOOT;  Surgeon: Renny Duval DPM;  Location: [facility] OR;  Service: Podiatry;  Laterality: Left;       Family History: family history includes Alzheimer's disease in his mother.    Social History:  reports that he has never smoked. He has never used smokeless tobacco. He reports that he does not drink alcohol and does not use drugs.    Home Medications:  Prior to Admission medications    Medication Sig Start Date End Date Taking? Authorizing Provider   Ascorbic Acid (VITAMIN C PO) Take 500 mg by mouth Daily.   Yes Elodia Sánchez MD   atorvastatin (LIPITOR) 40 MG tablet Take 1 tablet by mouth Every Night. 7/26/24  Yes Elodia Sánchez MD   bisacodyl (DULCOLAX) 10 MG suppository Insert 1 suppository into the rectum Daily As Needed for Constipation (Use if bisacodyl oral is ineffective). 6/27/24  Yes Shola Betancourt MD   bisacodyl (DULCOLAX) 5 MG EC tablet Take 1 tablet by mouth Daily As Needed for Constipation (Use if polyethylene glycol is ineffective). 6/27/24  Yes Shola Betancourt MD   calcium carbonate (TUMS) 500 MG chewable tablet Chew 2 tablets 2 (Two)  Times a Day As Needed for Heartburn. 6/27/24  Yes Shola Betancourt MD   collagenase 250 UNIT/GM ointment Apply 1 Application topically to the appropriate area as directed Daily.   Yes Elodia Sánchez MD   cyclobenzaprine (FLEXERIL) 10 MG tablet Take 0.5 tablets by mouth At Night As Needed.   Yes Elodia Sánchez MD   HYDROcodone-acetaminophen (NORCO) 5-325 MG per tablet Take 1 tablet by mouth 3 (Three) Times a Day As Needed.   Yes Elodia Sánchez MD   lansoprazole (PREVACID SOLUTAB) 30 MG Tablet Delayed Release Dispersible disintegrating tablet Administer 1 tablet per G tube Every Morning. 4/10/24  Yes Shola Betancourt MD   levothyroxine (SYNTHROID, LEVOTHROID) 25 MCG tablet Take 1 tablet by mouth Daily. 8/14/24  Yes Elodia Sánchez MD   melatonin 3 MG tablet Take 1 tablet by mouth At Night As Needed for Sleep. 1/30/24  Yes Lakesha Sykes APRN   methotrexate 2.5 MG tablet Take 1 tablet by mouth 1 (One) Time Per Week. Take 2.5 mg on Wednesday and 2.5 mg on Thursday.  Do not take any medication on Friday through Tuesday.  Patient taking differently: Take 1 tablet by mouth 2 (Two) Times a Week. Take 2.5 mg on Wednesday and 2.5 mg on Thursday.  Do not take any medication on Friday through Tuesday. 4/9/24  Yes Shola Betancourt MD   metoprolol succinate XL (TOPROL-XL) 25 MG 24 hr tablet Take 1 tablet by mouth Daily. 8/29/24  Yes Nathanael Samuels MD   polyethylene glycol (MIRALAX) 17 g packet Take 17 g by mouth Daily As Needed (Use if senna-docusate is ineffective). 6/27/24  Yes Shola Betancourt MD   Potassium Bicarb-Citric Acid (Effer-K) 20 MEQ effervescent tablet Take 1 tablet by mouth Daily.   Yes Elodia Sánchez MD   potassium chloride (KAYCIEL) 20 mEq/15 mL solution Take 15 mL by mouth Daily.   Yes Elodia Sánchez MD   sennosides-docusate (PERICOLACE) 8.6-50 MG per tablet Take 2 tablets by mouth 2 (Two) Times a Day. 8/28/24  Yes Arvind,  Nathanael Gunn MD   sodium hypochlorite (DAKIN'S 1/4 STRENGTH) 0.125 % solution topical solution 0.125% Apply  topically to the appropriate area as directed 3 times a day.   Yes ProviderElodia MD   terazosin (HYTRIN) 1 MG capsule Administer 1 capsule per G tube Every Night.  Patient taking differently: Take 1 capsule by mouth Every Night. 4/9/24  Yes Shola Betancourt MD   Wound Dressings (Medihoney Wound/Burn Dressing) Paste Apply 1 Application topically to the appropriate area as directed Daily.   Yes Provider, MD Elodia       Allergies:  No Known Allergies    Objective     Vitals:   Temp:  [96.8 °F (36 °C)-99.7 °F (37.6 °C)] 98 °F (36.7 °C)  Heart Rate:  [107-128] 115  Resp:  [26-42] 26  BP: (127-150)/(65-82) 127/65  Flow (L/min):  [5] 5    Intake/Output Summary (Last 24 hours) at 9/23/2024 1800  Last data filed at 9/23/2024 0600  Gross per 24 hour   Intake 950 ml   Output 900 ml   Net 50 ml       Physical Exam:   Constitutional: Chronically ill and deconditioned with bilateral temporal wasting  HEENT: Sclera anicteric, poor oral hygiene  Neck: Supple, no thyromegaly, no lymphadenopathy, trachea at midline, no JVD  Respiratory: Fine crackles bibasal  Cardiovascular: Tachycardic  Gastrointestinal: Positive bowel sounds, abdomen is soft, nontender and nondistended  : No palpable bladder  Musculoskeletal: Severe edema        Scheduled Meds:     atorvastatin, 40 mg, Per PEG Tube, Daily  cefTRIAXone, 2,000 mg, Intravenous, Q24H  chlorhexidine, 15 mL, Mouth/Throat, Q12H  desmopressin (DDAVP) 1 mcg in sodium chloride 0.9 % 50 mL IVPB, 1 mcg, Intravenous, Once  enoxaparin, 40 mg, Subcutaneous, Q24H  guaifenesin, 200 mg, Per PEG Tube, Q6H  ipratropium-albuterol, 3 mL, Nebulization, 4x Daily - RT  levothyroxine, 25 mcg, Per PEG Tube, Q AM  pantoprazole, 40 mg, Intravenous, Q24H  potassium phosphate, 15 mmol, Intravenous, Q3H  senna-docusate sodium, 2 tablet, Per PEG Tube, BID  sodium hypochlorite, ,  Topical, Q12H  vancomycin, 1,000 mg, Intravenous, Q12H      IV Meds:   dexmedetomidine, 0.2-1.5 mcg/kg/hr, Last Rate: Stopped (09/21/24 0252)  norepinephrine, 0.02-0.3 mcg/kg/min, Last Rate: Stopped (09/21/24 0716)  Pharmacy to dose vancomycin,   vasopressin, 0.03 Units/min, Last Rate: Stopped (09/18/24 1200)        Results Reviewed:   I have personally reviewed the results from the time of this admission to 9/23/2024 18:00 EDT     Lab Results   Component Value Date    GLUCOSE 130 (H) 09/23/2024    CALCIUM 8.5 (L) 09/23/2024     (H) 09/23/2024    K 3.9 09/23/2024    CO2 25.9 09/23/2024     (H) 09/23/2024    BUN 17 09/23/2024    CREATININE 0.43 (L) 09/23/2024    EGFRIFAFRI 84 03/27/2018    EGFRIFNONA 69 03/27/2018    BCR 39.5 (H) 09/23/2024    ANIONGAP 3.1 (L) 09/23/2024      Lab Results   Component Value Date    MG 2.0 09/23/2024    PHOS 0.3 (C) 09/23/2024    ALBUMIN 1.7 (L) 09/23/2024           Assessment / Plan       Pneumonia of both lungs due to methicillin resistant Staphylococcus aureus (MRSA)    Ankylosing spondylitis    Essential hypertension    Immobility    DISH (disseminated idiopathic skeletal hyperostosis)    GERD without esophagitis    Dysphagia    Failure to thrive in adult    Anemia    Indwelling Mariscal catheter present    Feeding by G-tube    Hyperlipidemia    Hypothyroidism (acquired)    Sacral wound    Septic shock    Pneumonia, aspiration    Acute respiratory failure with hypoxia and hypercapnia    NSTEMI (non-ST elevated myocardial infarction)    Subacute osteomyelitis of left foot    Hypokalemia    Hypophosphatemia    Hypernatremia      ASSESSMENT:    -Hypernatremia with calculated water deficit 4.2 L.  Unfortunately with severe anasarca, third spacing from malnutrition.  With decreased effective intravascular volume.  Unfortunately attempt to provide IV fluids likely will decompensate his weak respiratory pattern and will require repeat intubation.  I will attempt to hold his feeds  that could be a contributor for osmotic diuresis continue low rate free water flushes via PEG tube and to avoid any extra fluids.  Will attempt to decrease free water excretion by trial of desmopressin.  -History of septic shock secondary to bilateral aspiration pneumonia due to MRSA and E. coli with hypoxic respiratory failure followed by pulmonary and primary team regarding the patient mechanical support extubated 9/20/2024  -Multiple sacral pressure ulcers followed by wound care and primary team  -Chronic immobility syndrome with dysphagia and PEG tube placement with malnutrition followed by dietary  -Ankylosing spondylitis previously on methotrexate currently on hold likely will not have a significant long-term benefit on his mobility with high risk of complications and agree with primary team with discontinuing long-term    PLAN:  -Patient unfortunately with significant third spacing and anasarca for malnutrition with decreased effective intravascular volume.  Unfortunately aggressive free water replacement will push him into respiratory distress and reintubation will attempt gentle free water.  Replacement by PEG holding feeds for concern of osmotic diuresis and trial of DDAVP to decrease free water excretion  -Will continue to follow sodium closely  -Patient overall with poor prognosis    Discussed with nursing staff at bedside    Thank you for involving us in the care of Anthony Gallegos.  Please feel free to call with any questions.    Kishan Woo MD  09/23/24  18:00 EDT    [facility]  [phone]      Please note that portions of this note were completed with a voice recognition program.      Electronically signed by Kishan Woo MD at 09/23/24 5656       Yamilet Shipley PA-C at 09/23/24 1129        Consult Orders    1. Inpatient Neurology Consult General [[id number]] ordered by Walter Mancia MD at 09/23/24 1033              Attestation signed by Fred  MD Rohit at 09/23/24 2485     I have reviewed the history and plan as obtained by Yamilet Shipley PA-C  and preformed my own independent history and adjusted documentation as needed. I have personally examined the patient. I personally performed >50% of the management in this split/shared patient. My exam confirms their physical exam findings and I agree with the plan, with the addition of the following: This is an 79 years old white male with known diagnosis of hypertension, mixed hyperlipidemia, ankylosing spondylitis, multiple decubitus ulcers, PEG tube dependency, osteomyelitis presented to the hospital on 9/16 with altered mental status he was found to have MRSA pneumonia, E. coli pneumonia, acute hypoxic respiratory failure and septic showed we were consulted for unintelligible speech which was noticed after extubation on 9/28, on my exam the patient seemed severely lethargic and ill looking, no focal finding on exam which make me think his speech issue likely related to underlying medical conditions and his current sepsis.  I will order an MRI brain to rule out stroke even though I have low suspicion.    -Assessment  Unintelligible speech after recent extubation the patient with sepsis and severely ill looking likely related to underlying medical conditions, stroke felt less likely will get an MRI brain to rule it out.    Plan  -MRI brain with and without contrast to rule out stroke or brain infection even though felt less likely.  -We will follow peripherally on the MRI results.                    Neurology Consult Note    Consult Date: 9/23/2024    Referring MD: Livier Vegas MD    Reason for Consult I have been asked to see the patient in neurological consultation to render advice and opinion regarding unintelligible speech.     Anthony Gallegos is a 79 y.o. male with PMH o HTN, HLD, ankylosing spondylitis, MARTITA, multiple decubitus ulcers, chronic forde, PEG tube dependency, and recent hospital  admission for calcaneal osteomyelitis presents to the ED on 9/16 with AMS and found to be  hypoxic and hypotensive.  Labs were notable for WBC 6, lactate 6, Pro-Carmelo 68, RPP negative, and lipase 8. CXR showed bilateral lung opacities. CT of the head did not show any acute process. He was intubated and admitted to the intensive care unit. He was started on vasopressors. His blood cultures are negative to date. His respiratory culture showed MRSA and E. coli. Patient  found to be septic secondary to bilateral aspiration pneumonia. He has since been treated wit meropenum discontinued on 9/19 and continues to Vancomycin and Rocephin through 9/24.    Neurology consulted for unintelligible speech new since last admission which was noted post extubation on 9/20. Unclear when this began exactly. Patient able to follow several simple and some multi-step commands on exam. No cranial nerve deficits. Patient generally weak but not worse on one side. Sensation in tact and dysmetria difficulty to assess but nothing obvious on exam. History unable to be obtained from patient with such impaired speech and no family current at bedside. Patient vitals 127/65 mmHg, pulse 107, RR 32, temp 96.8 degree F.     Past Medical/Surgical Hx:  Past Medical History:   Diagnosis Date    Abscess of scrotum     MARTITA (acute kidney injury)     Altered mental state     Arthritis     AS (ankylosing spondylitis)     History of MRSA infection     Hypertension     Leukocytosis     Tetrahydrocannabinol (THC) use disorder, mild, abuse 12/20/2023    Uveitis      Past Surgical History:   Procedure Laterality Date    COLONOSCOPY      ENDOSCOPY W/ PEG TUBE PLACEMENT N/A 03/29/2024    Procedure: ESOPHAGOGASTRODUODENOSCOPY WITH PERCUTANEOUS ENDOSCOPIC GASTROSTOMY TUBE INSERTION;  Surgeon: Khadar Broderick MD;  Location: [facility] ENDOSCOPY;  Service: General;  Laterality: N/A;  PRE/POST - DYSPHAGIA    ROTATOR CUFF REPAIR Right     TOTAL HIP ARTHROPLASTY Left  2014    UPPER GASTROINTESTINAL ENDOSCOPY      WOUND DEBRIDEMENT N/A 08/20/2024    Procedure: DEBRIDEMENT SACRAL ULCER/WOUND;  Surgeon: Justine Roque MD;  Location: [facility] MAIN OR;  Service: General;  Laterality: N/A;    WOUND DEBRIDEMENT Left 08/20/2024    Procedure: LEFT DEBRIDEMENT FOOT;  Surgeon: Renny Duval DPM;  Location: [facility] MAIN OR;  Service: Podiatry;  Laterality: Left;       Medications On Admission  Medications Prior to Admission   Medication Sig Dispense Refill Last Dose    Ascorbic Acid (VITAMIN C PO) Take 500 mg by mouth Daily.   9/16/2024    atorvastatin (LIPITOR) 40 MG tablet Take 1 tablet by mouth Every Night.   9/16/2024    bisacodyl (DULCOLAX) 10 MG suppository Insert 1 suppository into the rectum Daily As Needed for Constipation (Use if bisacodyl oral is ineffective).       bisacodyl (DULCOLAX) 5 MG EC tablet Take 1 tablet by mouth Daily As Needed for Constipation (Use if polyethylene glycol is ineffective).       calcium carbonate (TUMS) 500 MG chewable tablet Chew 2 tablets 2 (Two) Times a Day As Needed for Heartburn.       collagenase 250 UNIT/GM ointment Apply 1 Application topically to the appropriate area as directed Daily.   9/16/2024    cyclobenzaprine (FLEXERIL) 10 MG tablet Take 0.5 tablets by mouth At Night As Needed.       HYDROcodone-acetaminophen (NORCO) 5-325 MG per tablet Take 1 tablet by mouth 3 (Three) Times a Day As Needed.   9/16/2024    lansoprazole (PREVACID SOLUTAB) 30 MG Tablet Delayed Release Dispersible disintegrating tablet Administer 1 tablet per G tube Every Morning.   9/16/2024    levothyroxine (SYNTHROID, LEVOTHROID) 25 MCG tablet Take 1 tablet by mouth Daily.   9/16/2024    melatonin 3 MG tablet Take 1 tablet by mouth At Night As Needed for Sleep.       methotrexate 2.5 MG tablet Take 1 tablet by mouth 1 (One) Time Per Week. Take 2.5 mg on Wednesday and 2.5 mg on Thursday.  Do not take any medication on Friday through Tuesday. (Patient taking differently:  Take 1 tablet by mouth 2 (Two) Times a Week. Take 2.5 mg on Wednesday and 2.5 mg on Thursday.  Do not take any medication on Friday through Tuesday.)   Past Week    metoprolol succinate XL (TOPROL-XL) 25 MG 24 hr tablet Take 1 tablet by mouth Daily.   9/16/2024    polyethylene glycol (MIRALAX) 17 g packet Take 17 g by mouth Daily As Needed (Use if senna-docusate is ineffective).       Potassium Bicarb-Citric Acid (Effer-K) 20 MEQ effervescent tablet Take 1 tablet by mouth Daily.   9/16/2024    potassium chloride (KAYCIEL) 20 mEq/15 mL solution Take 15 mL by mouth Daily.   9/16/2024    sennosides-docusate (PERICOLACE) 8.6-50 MG per tablet Take 2 tablets by mouth 2 (Two) Times a Day.   9/16/2024    sodium hypochlorite (DAKIN'S 1/4 STRENGTH) 0.125 % solution topical solution 0.125% Apply  topically to the appropriate area as directed 3 times a day.   9/16/2024    terazosin (HYTRIN) 1 MG capsule Administer 1 capsule per G tube Every Night. (Patient taking differently: Take 1 capsule by mouth Every Night.)   9/16/2024    Wound Dressings (Medihoney Wound/Burn Dressing) Paste Apply 1 Application topically to the appropriate area as directed Daily.   9/16/2024       Allergies:  No Known Allergies    Social Hx:  Social History     Socioeconomic History    Marital status:     Number of children: 2   Tobacco Use    Smoking status: Never    Smokeless tobacco: Never   Vaping Use    Vaping status: Never Used   Substance and Sexual Activity    Alcohol use: No    Drug use: No    Sexual activity: Defer       Family Hx:  Family History   Problem Relation Age of Onset    Alzheimer's disease Mother     Colon cancer Neg Hx     Colon polyps Neg Hx     Crohn's disease Neg Hx     Irritable bowel syndrome Neg Hx     Ulcerative colitis Neg Hx     Malig Hyperthermia Neg Hx        Exam    /65 (BP Location: Right arm, Patient Position: Lying)   Pulse 107   Temp 97.5 °F (36.4 °C) (Axillary)   Resp (!) 32   Wt 85 kg (187 lb 6.3  oz)   SpO2 96%   BMI 23.42 kg/m²   gen: NAD, vitals reviewed  HEENT: no nuchal rigidity  MS: oriented x3, speech unintelligible with intact comprehension able to follow several simple and some multi-step command, no neglect, normal fund of knowledge  CN: blinks to threat bilaterally, PERRL, EOMI, no facial droop, palate elevates symmetrically, shoulder shrug equal, tongue midline  Motor: 4+/5 bilateral upper extremities, 2+/5 bilateral lower extremities  Sensation: intact to nail bed stimulation throughout  Coordination: no dysmetria with finger to nose bilaterally, but limited as only goes half way to chin and then to finger    DATA:    Lab Results   Component Value Date    GLUCOSE 126 (H) 09/20/2024    CALCIUM 7.7 (L) 09/20/2024     (H) 09/20/2024    K 4.0 09/22/2024    CO2 24.1 09/20/2024     (H) 09/20/2024    BUN 11 09/20/2024    CREATININE 0.42 (L) 09/22/2024    EGFRIFAFRI 84 03/27/2018    EGFRIFNONA 69 03/27/2018    BCR 28.9 (H) 09/20/2024    ANIONGAP 5.9 09/20/2024     Lab Results   Component Value Date    WBC 14.91 (H) 09/23/2024    HGB 8.5 (L) 09/23/2024    HCT 28.5 (L) 09/23/2024    MCV 84.6 09/23/2024     09/23/2024     Lab Results   Component Value Date    LDL 91 03/27/2018     Lab Results   Component Value Date    HGBA1C 5.80 (H) 01/23/2024     Lab Results   Component Value Date    INR 1.52 (H) 08/17/2024    INR 1.20 (H) 12/20/2023    PROTIME 18.6 (H) 08/17/2024    PROTIME 15.3 (H) 12/20/2023       Lab review:   Glucose 126  Calcium 7.7  Na 147  WBC 14.91  HgB 8.5  Procalcitonin 6.77    Imaging review:   CTH without contrast:  FINDINGS:    Brain:  No acute intracranial abnormality.  Consider MRI if there is   further concern.  Areas of decreased attenuation in the deep cerebral   white matter are consistent with small vessel ischemic degenerative   changes.  The cerebral and cerebellar sulci are prominent consistent with   brain atrophy.  No hemorrhage.    Ventricles:  Unremarkable.   No ventriculomegaly.    Bones joints:  Unremarkable.  No acute fracture.    Soft tissues:  Unremarkable.    Vasculature:  Atherosclerotic disease.    Sinuses:  Unremarkable as visualized.    Mastoid air cells:  Unremarkable as visualized.  No mastoid effusion.     IMPRESSION:       1.  No acute intracranial abnormality.  Consider MRI if there is further   concern.  2.  Small vessel ischemic degenerative changes.  3.  Cerebral and cerebellar atrophy.    Diagnoses:  Unintelligible speech  Bilateral Aspiration Pneumonia with acute respiratory failure with positive e.coli and MRSA respiratory cultures.   Sepsis   HLD   HTN  Chronic forde  PEG tube dependence.   Multiple decubitus ulcers  Recent hospitalization for left calcaneal ostomyelitis  Ankylosing spondylitis chronically on MTX    Comment: Patient is an 79 y.o. male with PMH o HTN, HLD, ankylosing spondylitis, MARTITA, multiple decubitus ulcers, chronic forde, PEG tube dependency, and recent hospital admission for calcaneal osteomyelitis presents to the ED on 9/16 with AMS and found to be  hypoxic and hypotensive.  Labs were notable for WBC 6, lactate 6, Pro-Carmelo 68, RPP negative, and lipase 8. CXR showed bilateral lung opacities. CT of the head did not show any acute process. He was intubated and admitted to the intensive care unit. He was started on vasopressors. His blood cultures are negative to date. His respiratory culture showed MRSA and E. coli. Patient  found to be septic secondary to bilateral aspiration pneumonia. He has since been treated wit meropenum discontinued on 9/19 and continues to Vancomycin and Rocephin through 9/24.    Neurology consulted for unintelligible speech. Speech concerning for stroke vs encephalitis vs metabolic encephalopathy vs medication side effect. Patient on robitussin, ceftriaxone, and fetanyl. Also had meropenum discontinued on 9/19. Will plan for MRI brain with and without contrast to further evaluated. If MRI negative, may  need to consider medication adjustments. Patient should continue full course of antibiotics.     PLAN:   MRI brain with and without contrast  Continue on Rocephin and Vancomycin per ID recommendations.    Recommendations discussed with Dr. Ibarra who is in agreement with plan.     Electronically signed by Rohit Ibarra MD at 09/23/24 [id number]

## 2024-09-24 NOTE — PLAN OF CARE
Goal Outcome Evaluation:  Plan of Care Reviewed With: patient        Progress: no change       Pt alert to begin shift, but still unable to follow commands and speech unintelligible. Pt was given 2mg IV ativan around 1020 d/t restlessness in order to obtain LP. Pt resp status declined in IR and he was placed on 10L non rebreather and RN brought pt back to room. LP was then successfully obtained by neurology at bedside at 1300. Pt remains sedated/somnolent, but will arouse to verbal/physical stimuli. Placed on bipap around 1630 and ABG and cxr obtained d/t RR sustaining 40-50's. Spoke with Dr. Bell at bedside who made the decision to upgrade pt to ICU LOC. Pt unable to resume TF until 2100 when HOB is able to be raised as pt is post-LP and must remain flat. 2 amps D50 given on shift for hypoglycemia. Phos and K replaced per protocol. Mariscal in place. WCTM.

## 2024-09-24 NOTE — PROGRESS NOTES
DOS: 2024  NAME: Anthony Gallegos   : 1945  PCP: Keshav Eason MD  Chief Complaint   Patient presents with    Shortness of Breath     Stroke    Subjective: Patient resting in bed after failed lumbar puncture attemp with nonrebreather mask on.  Patient was enunciating words better this a.m., speech still unintelligible but states he was able to make out some words that he said was improved.  Patient was also moving all 4 extremities more today but was still not following commands.  Patient attempted to take off his oxygen several times this morning.  Nurse was concerned patient would not be able to tolerate LP moving excessively and not following commands so he gave the patient 2 mg of Ativan.  Patient was brought down to IR he was not responding to verbal stimulation so they agreed he was unable to tolerate LP for today and was brought back up to his room.  Patient has since been placed on a nonrebreather mask.  Exam limited as he was given Ativan shortly prior to.  History primarily obtained from first and chart review.  No family present at bedside.    Interval History  History taken from: patient    Objective:  Vital signs: /71 (BP Location: Right arm, Patient Position: Lying)   Pulse 110   Temp 97.5 °F (36.4 °C) (Axillary)   Resp 20   Wt 85 kg (187 lb 6.3 oz)   SpO2 93%   BMI 23.42 kg/m²       Physical Exam:  GENERAL: ill-appearing, vitals reviewed   MS: lethargic and disoriented . Non-verbal on exam as recently received 2 mg ativan prior. No neglect.  CN: blinks to threat bilaterally, PERRL, EOMI, no facial droop  Motor: moves all extremities spontaneously  Sensory: withdrawals and grimaces to pain in all four extremities   Coordination: CALDERON    Results Review:     I reviewed the patient's new clinical results.    Current Medications:  Scheduled Medications:albumin human, 25 g, Intravenous, Q6H  atorvastatin, 40 mg, Per PEG Tube, Daily  cefTRIAXone, 2,000 mg, Intravenous,  "Q24H  chlorhexidine, 15 mL, Mouth/Throat, Q12H  dextrose, 100 mL, Intravenous, Q6H  enoxaparin, 40 mg, Subcutaneous, Q24H  furosemide, 20 mg, Intravenous, Q12H  guaifenesin, 200 mg, Per PEG Tube, Q6H  ipratropium-albuterol, 3 mL, Nebulization, 4x Daily - RT  levothyroxine, 25 mcg, Per PEG Tube, Q AM  pantoprazole, 40 mg, Intravenous, Q24H  potassium phosphate, 15 mmol, Intravenous, Once  senna-docusate sodium, 2 tablet, Per PEG Tube, BID  sodium hypochlorite, , Topical, Q12H  vancomycin, 1,000 mg, Intravenous, Q12H      Infusions: Pharmacy to dose vancomycin,       PRN Medications:    acetaminophen **OR** acetaminophen    senna-docusate sodium **AND** polyethylene glycol **AND** bisacodyl **AND** bisacodyl    Calcium Replacement - Follow Nurse / BPA Driven Protocol    dextrose    dextrose    glucagon (human recombinant)    Magnesium Standard Dose Replacement - Follow Nurse / BPA Driven Protocol    nitroglycerin    Pharmacy to dose vancomycin    Phosphorus Replacement - Follow Nurse / BPA Driven Protocol    Potassium Replacement - Follow Nurse / BPA Driven Protocol    Medications Reviewed:     Laboratory results:  Lab Results   Component Value Date    GLUCOSE 80 09/24/2024    CALCIUM 8.3 (L) 09/24/2024     (H) 09/24/2024    K 3.5 09/24/2024    CO2 27.0 09/24/2024     (H) 09/24/2024    BUN 20 09/24/2024    CREATININE 0.40 (L) 09/24/2024    EGFRIFAFRI 84 03/27/2018    EGFRIFNONA 69 03/27/2018    BCR 50.0 (H) 09/24/2024    ANIONGAP 6.0 09/24/2024     Lab Results   Component Value Date    WBC 13.94 (H) 09/24/2024    HGB 8.7 (L) 09/24/2024    HCT 26.5 (L) 09/24/2024    MCV 80.5 09/24/2024     09/24/2024          Lab Results   Component Value Date    INR 1.52 (H) 08/17/2024    INR 1.20 (H) 12/20/2023    PROTIME 18.6 (H) 08/17/2024    PROTIME 15.3 (H) 12/20/2023     Lab Results   Component Value Date    TSH 3.150 09/23/2024     No components found for: \"LDLCALC\"  Lab Results   Component Value Date    " "HGBA1C 5.80 (H) 01/23/2024     No components found for: \"B12\"    Review and interpretation of imaging:  Sodium 154  Chloride 121  Creatinine 0.4  Calcium 8.3  Phosphorus 0.3 yesterday to 1.8 today  Alk phos 171  Albumin 1.9  WBC 13.94  Hemoglobin 8.7  CSF: colorless, appearance slightly hazy, pink, RBC tube 1 712, tube 3 6320, total nucleated cells 25, Protein 47.9, glucose 63, cryptococcal antigen negative,      MRI brain:  FINDINGS:  Multiplanar images of the head were obtained without and with  gadolinium. Images are severely degraded by motion artifact. Mucosal  thickening is noted within the ethmoid sinuses. No areas of restricted  diffusion are seen to suggest acute infarct. There is atrophy. There is  periventricular and deep white matter microangiopathic change. There is  no midline shift or mass effect. Intracranial flow voids appear intact.  Old infarct is suspected within the right frontal corona radiata. No  definite abnormality is seen on susceptibility weighted imaging,  although there is severe motion artifact on these images. Patient  appears to have some mild diffuse pachymeningeal enhancement.  IMPRESSION:  1. Images are significantly degraded by motion artifact. No restricted  diffusion is seen to suggest acute infarct. The patient is noted to have  mild diffuse pachymeningeal enhancement. Appearance is nonspecific, but  potentially may be infectious or inflammatory in etiology. Correlation  with clinical presentation is recommended.       Impression: Patient is an 79 y.o. male with PMH o HTN, HLD, ankylosing spondylitis, MARTITA, multiple decubitus ulcers, chronic forde, PEG tube dependency, and recent hospital admission for calcaneal osteomyelitis presents to the ED on 9/16 with AMS and found to be  hypoxic and hypotensive.  Labs were notable for WBC 6, lactate 6, Pro-Carmelo 68, RPP negative, and lipase 8. CXR showed bilateral lung opacities. CT of the head did not show any acute process. He was " intubated and admitted to the intensive care unit. He was started on vasopressors. His blood cultures are negative to date. His respiratory culture showed MRSA and E. coli. Patient  found to be septic secondary to bilateral aspiration pneumonia. He has since been treated wit meropenum discontinued on 9/19 and continues to Vancomycin and Rocephin through 9/24.     Neurology consulted for unintelligible speech. Speech concerning for encephalitis vs inflammatory process versus metabolic encephalopathy due to sepsis and extremely ill-appearing vs medication side effect versus electrolyte abnormality. Patient on robitussin, ceftriaxone, and fetanyl. Also had meropenum discontinued on 9/19. MRI brain concerning for mild diffuse pachymeningeal enhancement, nonspecific but may be infectious or inflammatory etiology. 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 Exam limited by patient motion. Plan was to perform LP but patient moving too much and not following commands so 2 mg of ativan given prior. Patient was non-responsive to verbal stimulation after and IR concerned unable to tolerate LP so was not performed. He was changed to non-rebreather mask. Patient remains afebrile and white count 13.94. Lumbar puncture performed at bedside by Dr. Ibarra. Patient CSF labs found to have slightly increase protein at 47.9, normal glucose, mild pleocytosis at 25, tube 3 had 6320 RBC due to traumatic tap, glucose WNL at 63.     Diagnoses:  Unintelligible speech  Abnormal MRI Brain concerning for possible infectious or inflammatory process  Bilateral Aspiration Pneumonia with acute respiratory failure with positive e.coli and MRSA respiratory cultures.   Sepsis   HLD   HTN  Chronic forde  PEG tube dependence.   Multiple decubitus ulcers  Recent hospitalization for left calcaneal ostomyelitis  Ankylosing spondylitis chronically on MTX      Plan:  CSF labs pending: ACE, [place] virus,  cytomegalovirus, AFB, lyme, VDRL    I have discussed the above with the patient. Further recommendations to follow from Dr. Ibarra.     Yamilet Shipley PA-C  09/24/24  11:48 EDT        Pneumonia of both lungs due to methicillin resistant Staphylococcus aureus (MRSA)    Ankylosing spondylitis    Essential hypertension    Immobility    DISH (disseminated idiopathic skeletal hyperostosis)    GERD without esophagitis    Dysphagia    Failure to thrive in adult    Anemia    Indwelling Mariscal catheter present    Feeding by G-tube    Hyperlipidemia    Hypothyroidism (acquired)    Sacral wound    Septic shock    Pneumonia, aspiration    Acute respiratory failure with hypoxia and hypercapnia    NSTEMI (non-ST elevated myocardial infarction)    Subacute osteomyelitis of left foot    Hypokalemia    Hypophosphatemia    Hypernatremia

## 2024-09-24 NOTE — PROGRESS NOTES
Name: Anthony Gallegos ADMIT: 2024   : 1945  PCP: Keshav Eason MD    MRN: [id number] LOS: 7 days   AGE/SEX: 79 y.o. male  ROOM: [facility]     Subjective   Subjective   Pt still unable to answer any questions today. Will respond only to noxious stimuli this afternoon. Upper airway noise worse.       Objective   Objective   Vital Signs  Temp:  [97.4 °F (36.3 °C)-98.2 °F (36.8 °C)] 97.5 °F (36.4 °C)  Heart Rate:  [102-116] 102  Resp:  [12-42] 20  BP: (113-176)/(71-86) 113/72  SpO2:  [92 %-98 %] 94 %  on  Flow (L/min):  [5] 5;   Device (Oxygen Therapy): nasal cannula  Body mass index is 23.42 kg/m².  Physical Exam  Vitals and nursing note reviewed.   Constitutional:       General: He is not in acute distress.     Appearance: He is ill-appearing. He is not toxic-appearing or diaphoretic.      Comments: Somnolent   HENT:      Head: Normocephalic.      Nose: Nose normal.      Mouth/Throat:      Mouth: Mucous membranes are dry.      Pharynx: Oropharynx is clear.   Eyes:      General: No scleral icterus.        Right eye: No discharge.         Left eye: No discharge.      Conjunctiva/sclera: Conjunctivae normal.   Cardiovascular:      Rate and Rhythm: Normal rate and regular rhythm.      Pulses: Normal pulses.   Pulmonary:      Effort: Pulmonary effort is normal. No respiratory distress.      Breath sounds: No wheezing or rales.      Comments: Coarse upper airway noise  Abdominal:      General: Bowel sounds are normal. There is no distension.      Palpations: Abdomen is soft.      Tenderness: There is no abdominal tenderness.      Comments: PEG looks fine   Genitourinary:     Comments: Yellow urine in forde bag  Musculoskeletal:         General: Swelling (1+ in all extremities) present.      Cervical back: Neck supple.      Comments: Heel boots in place  SCDs in place  Dressing to left heel   Skin:     General: Skin is warm and dry.      Capillary Refill: Capillary refill takes less than 2 seconds.       Coloration: Skin is not jaundiced.   Neurological:      Comments: Will mumble in response to questions but will not open eyes or follow commands   Psychiatric:      Comments: Unable to assess       Results Review     I reviewed the patient's new clinical results.  Results from last 7 days   Lab Units 09/24/24 0421 09/23/24  0948 09/20/24 0728 09/19/24  0446   WBC 10*3/mm3 13.94* 14.91* 12.56* 15.38*   HEMOGLOBIN g/dL 8.7* 8.5* 8.5* 8.2*   PLATELETS 10*3/mm3 242 219 241 252     Results from last 7 days   Lab Units 09/24/24 0421 09/23/24  0948 09/22/24  2142 09/22/24  0814 09/21/24  1902 09/21/24  0710 09/20/24  1122 09/20/24 0728 09/19/24 2212 09/19/24  0446   SODIUM mmol/L 154* 154*  --   --   --   --   --  147*  --  145   POTASSIUM mmol/L 3.5 3.9 4.0 3.6   < > 3.4*   < > 2.7*   < > 2.3*   CHLORIDE mmol/L 121* 125*  --   --   --   --   --  117*  --  114*   CO2 mmol/L 27.0 25.9  --   --   --   --   --  24.1  --  24.3   BUN mg/dL 20 17  --   --   --   --   --  11  --  10   CREATININE mg/dL 0.40* 0.43*  --  0.42*  --  0.36*  --  0.38*  --  0.41*   GLUCOSE mg/dL 80 130*  --   --   --   --   --  126*  --  92   EGFR mL/min/1.73 110.3 107.9  --  108.7  --  113.9  --  112.0  --  109.5    < > = values in this interval not displayed.     Results from last 7 days   Lab Units 09/24/24 0421 09/23/24 0948   ALBUMIN g/dL 1.9* 1.7*   BILIRUBIN mg/dL 0.5 0.4   ALK PHOS U/L 171* 177*   AST (SGOT) U/L 39 45*   ALT (SGPT) U/L 24 27     Results from last 7 days   Lab Units 09/24/24  0421 09/23/24  0948 09/21/24  1902 09/20/24  0728 09/19/24  0446   CALCIUM mg/dL 8.3* 8.5*  --  7.7* 7.3*   ALBUMIN g/dL 1.9* 1.7*  --   --   --    MAGNESIUM mg/dL 2.0 2.0 1.8  --  1.6   PHOSPHORUS mg/dL 1.8* 0.3*  --   --   --      Results from last 7 days   Lab Units 09/22/24  0814 09/18/24  0709 09/17/24  2308 09/17/24  1638   PROCALCITONIN ng/mL 6.77*  --   --   --    LACTATE mmol/L  --  2.0 2.7* 4.9*     Glucose   Date/Time Value Ref Range Status    09/24/2024 1331 76 70 - 130 mg/dL Final   09/24/2024 1153 56 (L) 70 - 130 mg/dL Final   09/24/2024 0631 78 70 - 130 mg/dL Final   09/24/2024 0620 52 (L) 70 - 130 mg/dL Final   09/24/2024 0022 77 70 - 130 mg/dL Final   09/24/2024 0007 63 (L) 70 - 130 mg/dL Final   09/23/2024 1557 88 70 - 130 mg/dL Final       MRI Brain With & Without Contrast    Result Date: 9/24/2024  1. Images are significantly degraded by motion artifact. No restricted diffusion is seen to suggest acute infarct. The patient is noted to have mild diffuse pachymeningeal enhancement. Appearance is nonspecific, but potentially may be infectious or inflammatory in etiology. Correlation with clinical presentation is recommended.   This report was finalized on 9/24/2024 4:49 AM by Dr. Kirsten Peña M.D on Workstation: [id number]       I have personally reviewed all medications:  Scheduled Medications  albumin human, 25 g, Intravenous, Q6H  atorvastatin, 40 mg, Per PEG Tube, Daily  cefTRIAXone, 2,000 mg, Intravenous, Q24H  chlorhexidine, 15 mL, Mouth/Throat, Q12H  dextrose, 100 mL, Intravenous, Q6H  enoxaparin, 40 mg, Subcutaneous, Q24H  furosemide, 20 mg, Intravenous, Q12H  guaifenesin, 200 mg, Per PEG Tube, Q6H  ipratropium-albuterol, 3 mL, Nebulization, 4x Daily - RT  levothyroxine, 25 mcg, Per PEG Tube, Q AM  pantoprazole, 40 mg, Intravenous, Q24H  potassium chloride, 10 mEq, Intravenous, Q1H  senna-docusate sodium, 2 tablet, Per PEG Tube, BID  sodium hypochlorite, , Topical, Q12H  vancomycin, 1,000 mg, Intravenous, Q12H    Infusions  Pharmacy to dose vancomycin,     Diet  NPO Diet NPO Type: Tube Feeding    I have personally reviewed:  [x]  Laboratory   [x]  Microbiology   [x]  Radiology   [x]  EKG/Telemetry  []  Cardiology/Vascular   []  Pathology    []  Records       Assessment/Plan     Active Hospital Problems    Diagnosis  POA    **Pneumonia of both lungs due to methicillin resistant Staphylococcus aureus (MRSA) [J15.212]  Yes     Hypophosphatemia [E83.39]  Yes    Hypernatremia [E87.0]  No    Pneumonia, aspiration [J69.0]  Yes    Acute respiratory failure with hypoxia and hypercapnia [J96.01, J96.02]  Yes    NSTEMI (non-ST elevated myocardial infarction) [I21.4]  Yes    Subacute osteomyelitis of left foot [M86.272]  Yes    Hypokalemia [E87.6]  No    Septic shock [A41.9, R65.21]  Yes    Hypothyroidism (acquired) [E03.9]  Yes    Sacral wound [S31.000A]  Yes    Hyperlipidemia [E78.5]  Yes    Feeding by G-tube [Z93.1]  Not Applicable    Indwelling Forde catheter present [Z97.8]  Not Applicable    Anemia [D64.9]  Yes    Failure to thrive in adult [R62.7]  Yes    Dysphagia [R13.10]  Yes    GERD without esophagitis [K21.9]  Yes    DISH (disseminated idiopathic skeletal hyperostosis) [M48.10]  Yes    Immobility [Z74.09]  Yes    Essential hypertension [I10]  Yes    Ankylosing spondylitis [M45.9]  Yes      Resolved Hospital Problems   No resolved problems to display.       78yo gentleman with ankylosing spondylitis, HTN, BPH, chronic forde, h/o ESBL E.coli UTI, GERD, HLD, [place], ACD, hypoT4, O/P dysphagia chronically on tube feeds, and recent admission with multiple pressure wounds and left calcaneal osteomyelitis, who presented to ER from SNF with altered mental status, hypotension, and acute hypoxic resp failure. He was intubated in ER and admitted to ICU with severe sepsis due to aspiration PNA. We were asked to assume care when pt transitioned out of ICU.     Septic shock  Bilateral aspiration PNA due to MRSA and E.coli  Acute hypoxic and hypercapnic resp failure  Mgmt per Pulm and ID  S/p pressors  Extubated 9/20  PCT down to 6.8 from 68 on admission  ID recommends continue Rocephin and Vanc through 9/24  Continue O2 and pulm hygiene efforts  Blood cultures NGTD, urinary Ag's neg, RVP neg  Resp culture grew MRSA and E.coli  No fever or hypotension currently, intermittent sinus tach noted    Type 2 NSTEMI  No ACS    Multiple pressure wounds  Left  calcaneal osteomyelitis  WOCN following, d/w them this AM and they note all wounds appear worse compared to last admission, they do not feel any require debridement at this time but will continue to follow  Palliative wound care to left calcaneal wound/osteo per plan last admission from ID and Pod    Hypokalemia  Replaced  Mg++ level fine    Hypernatremia  Intravascular volume deficit with severe anasarca from third-spacing due to malnutrition  Defer to Renal--appreciate their attention to pt  Currently planning D5 with albumin and Lasix    Immobility  Chronic forde  Dysphagia, PEG tube  Protein calorie malnutrition, severe  Continue PT  Good UOP  Continue tube feeds, Nutrition following  SLP recommends repeat VFSS when pt able to comply  Previous admission had been on modified diet but did not like to eat (only drank fluids) so his nutrition is primarily via tube feeds--suspect he will have to remain strict NPO from now on    Dysarthria/Aphasia  Speech is really unintelligible and that is new for him since last admissoin  Neuro consulted  MRI brain suggested possible inflammatory or infectious process  S/p LP today    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  Full code.  Discussed with patient and RN. No family present.  Prognosis poor.  Anticipate discharge back to SNU facility, timing yet to be determined.  Expected Discharge Date: 9/30/2024; Expected Discharge Time:       Walter Mancia MD  [facility]  09/24/24  14:47 EDT

## 2024-09-24 NOTE — PROGRESS NOTES
[facility] Progress Note      Patient Name: Anthony Gallegos  : 1945  MRN: [id number]  Primary Care Physician:  Keshav Eason MD  Date of admission: 2024    Subjective     Interval History:     Overnight no event    Patient sitting in bed on supplemental oxygen  Dyspneic at rest   Poorly interactive  offering no complaint      Review of Systems:   As noted above    Objective     Vitals:   Temp:  [96.8 °F (36 °C)-98.2 °F (36.8 °C)] 97.5 °F (36.4 °C)  Heart Rate:  [107-116] 110  Resp:  [12-42] 20  BP: (135-176)/(71-86) 135/71  Flow (L/min):  [5] 5    Intake/Output Summary (Last 24 hours) at 2024 0806  Last data filed at 2024 1856  Gross per 24 hour   Intake --   Output 650 ml   Net -650 ml       Physical Exam:      Constitutional: Chronically ill and deconditioned with bilateral temporal wasting  HEENT: Sclera anicteric, poor oral hygiene  Neck: Supple, no thyromegaly, no lymphadenopathy, trachea at midline, no JVD  Respiratory: Fine crackles bibasal  Cardiovascular: Tachycardic  Gastrointestinal: Positive bowel sounds, abdomen is soft, nontender and nondistended  : No palpable bladder  Musculoskeletal: Severe edema    Scheduled Meds:     atorvastatin, 40 mg, Per PEG Tube, Daily  cefTRIAXone, 2,000 mg, Intravenous, Q24H  chlorhexidine, 15 mL, Mouth/Throat, Q12H  enoxaparin, 40 mg, Subcutaneous, Q24H  guaifenesin, 200 mg, Per PEG Tube, Q6H  ipratropium-albuterol, 3 mL, Nebulization, 4x Daily - RT  levothyroxine, 25 mcg, Per PEG Tube, Q AM  pantoprazole, 40 mg, Intravenous, Q24H  potassium chloride, 10 mEq, Intravenous, Q1H  potassium phosphate, 15 mmol, Intravenous, Once  senna-docusate sodium, 2 tablet, Per PEG Tube, BID  sodium hypochlorite, , Topical, Q12H  vancomycin, 1,000 mg, Intravenous, Q12H      IV Meds:   dexmedetomidine, 0.2-1.5 mcg/kg/hr, Last Rate: Stopped (24 0252)  norepinephrine, 0.02-0.3 mcg/kg/min, Last Rate: Stopped (24  0716)  Pharmacy to dose vancomycin,   vasopressin, 0.03 Units/min, Last Rate: Stopped (09/18/24 1200)        Results Reviewed:   I have personally reviewed the results from the time of this admission to 9/24/2024 08:06 EDT     Results from last 7 days   Lab Units 09/24/24  0421 09/23/24  0948 09/22/24  2142 09/22/24  0814 09/20/24  1122 09/20/24  0728   SODIUM mmol/L 154* 154*  --   --   --  147*   POTASSIUM mmol/L 3.5 3.9 4.0 3.6   < > 2.7*   CHLORIDE mmol/L 121* 125*  --   --   --  117*   CO2 mmol/L 27.0 25.9  --   --   --  24.1   BUN mg/dL 20 17  --   --   --  11   CREATININE mg/dL 0.40* 0.43*  --  0.42*   < > 0.38*   CALCIUM mg/dL 8.3* 8.5*  --   --   --  7.7*   BILIRUBIN mg/dL 0.5 0.4  --   --   --   --    ALK PHOS U/L 171* 177*  --   --   --   --    ALT (SGPT) U/L 24 27  --   --   --   --    AST (SGOT) U/L 39 45*  --   --   --   --    GLUCOSE mg/dL 80 130*  --   --   --  126*    < > = values in this interval not displayed.       Estimated Creatinine Clearance: 177.1 mL/min (A) (by C-G formula based on SCr of 0.4 mg/dL (L)).    Results from last 7 days   Lab Units 09/24/24 0421 09/23/24  0948 09/21/24  1902   MAGNESIUM mg/dL 2.0 2.0 1.8   PHOSPHORUS mg/dL 1.8* 0.3*  --              Results from last 7 days   Lab Units 09/24/24 0421 09/23/24  0948 09/20/24  0728 09/19/24  0446 09/18/24  0709   WBC 10*3/mm3 13.94* 14.91* 12.56* 15.38* 13.50*   HEMOGLOBIN g/dL 8.7* 8.5* 8.5* 8.2* 8.3*   PLATELETS 10*3/mm3 242 219 241 252 248             Assessment / Plan     ASSESSMENT:    -Hypernatremia with calculated water deficit > 4  L.  Unfortunately with severe anasarca from  third spacing from malnutrition.  With decreased effective intravascular volume.  Unfortunately attempt to provide IV fluids likely will decompensate his weak respiratory pattern w HIGH risk of PULMONARY EDEMA . .  Continue PEG tube water flushes  -History of nephrotic syndrome earlier this year UPCR 8.4 and 3.7 g will repeat likely contributing  factor for volume overload  -History of septic shock secondary to bilateral aspiration pneumonia due to MRSA and E. coli with hypoxic respiratory failure followed by pulmonary and primary team regarding the patient mechanical support extubated 9/20/2024  -Multiple sacral pressure ulcers followed by wound care and primary team  -Chronic immobility syndrome with dysphagia and PEG tube placement with malnutrition followed by dietary  -Ankylosing spondylitis previously on methotrexate currently on hold likely will not have a significant long-term benefit on his mobility with high risk of complications and agree with primary team with discontinuing long-term    PLAN:    -Challenging case due to massive volume overload while intravascular depleted  -Patient unfortunately with significant third spacing and  anasarca from  malnutrition with decreased effective intravascular volume with over 4 L of water deficitw .    -Unfortunately the patient cannot tolerate significant IV fluids due to the risk of respiratory stress and high risk of pulmonary edema  -Will attempt small boluses of D5 with albumin  and low dose furosemide and will  evaluate clinical response   - Ok to resumed feed , not difference on sodium   -Will continue to follow sodium closely  -Patient overall with poor prognosis    Thank you for involving us in the care of Anthony Gallegos.  Please feel free to call with any questions.    Kishan Woo MD  09/24/24  08:06 EDT    [facility]  [phone]    Please note that portions of this note were completed with a voice recognition program.

## 2024-09-24 NOTE — PLAN OF CARE
Goal Outcome Evaluation:              Outcome Evaluation: Patient a/o self only, follows some commands and appears to understand some things said, but pt dosen't appear to have an understanding of care. Pt received K phos replacement this shift, needed dextrose this shift for low blood sugar. All meds given as ordered. Tube feeds are being held until 1800. Pt has thick secretions that isn't able to cough up due to week insufficiant cough. Pt suctioned as needed. See v/s and labs. Contact isolation maintained for MRSA and r/o candida.

## 2024-09-24 NOTE — CONSULTS
"Patient seen by Palliative Care Referral Team on recent hospitalization:    8/26/24  Assessment:  Patient is an 78 YO male with a history of ankylosing spondylitis of c-spine, BPH, chronic forde catheter, hydronephrosis, HTN, dysphagia with PEG for TF, with worsening sacral and ischial pressure ulcers.  He was admitted to the hospital with acute on chronic anemia and found to have an ESBL UTI. He also presents with failure to thrive as he has reportedly lost 35# in 4 months. During this hospitalization  he underwent debridement of his wounds and wound vacs were placed. Crys Duval, Nikolai and Jude have recommended palliative treatment for osteomyelitis.   Upon assessment he was resting in bed, watching television. He was oriented to self and place, told me that the year is 1954 and when I asked the name of the president he said \"ask me next week\". He stated that his physicians routinely discuss treatment options and prognosis with him, but he is unable to tell me what he knows.   Initially he indicated that he was having pain \"because of all that they have done to me\", but when prompted to describe the pain he denied pain altogether.             PPS: 40%    Other Comments: I met with this patient at bedside to offer support and to discuss his goals of care. He was pleasantly conversant but seemed to lack situational understanding . With his permission I called and spoke with his daughter Whit. We discussed his current condition, his treatment options and expected prognosis. She voiced her understanding that he is medically fragile, and is likely in terminal decline. She shared that she wishes to provide for a better quality of life for him, and would like to meet with [facility] for an explanation of services for support upon return to LTC. She feels that he is generally competent to make his own decisions, and wishes to privately discuss the burdens and benefits of CPR with him later today. Palliative Care will " "follow up tomorrow at her request to further discuss his wishes.     8/27/24:  I spoke with this patient's daughter Whit in follow up to our [facility] discussion. Whit shared that she and the patient discussed code status and met with [facility] yesterday. At this time, the patient wishes to remain a full code but is agreeable to use of [facility] as a supportive resource upon return to LTC facility. Whit requested that we not discuss code status any further at this point, for fear of overwhelming the patient. Whit also shared that she is in fact the patient's medical POA, documentation requested. She confirmed her understanding that [facility] will continue to provide support with discussion and decision making.     9/24/24  Palliative Care Referral received for this admission. I called and spoke with this patient's RN Conor as well as his daughter Whit. Conor voiced his concerns about the patient's decline in mentation and ability to maintain his airway. I relayed these concerns to dtr/HCS Whit, who confirmed that the patient would want to be re-intubated if the need arose, and that she and the family want to uphold his wishes. RN Conor informed.     Regarding recent discharge to facility with [facility]:  [facility] Admissions TERESA Arevalo noted that the patient was seen at [facility] following recent discharge, that the patient and family's goals of care did not align with [facility] services (re: use of wound vac and enteral feeding for malnutrition, full code status) and that the patient was referred instead to [facility]. [facility]  confirmed that the patient has an \"open referral\" but that contact has not yet been made.    We will follow up with this patient and family to provide support.    Maryellen Lind, Palliative Care Referral RN         "

## 2024-09-24 NOTE — NURSING NOTE
Patient arrived to [facility] triage for LP. Patient non responsive to verbal stimulation. O2 at 5 liters NC with sats at 84%. /70 Concerned patient is unable to tolerate LP at present. Changed to non re breather mask at 10 % oxygen with sats at 88%. NOHEMI Castillo came out to see patient with Dr. Partida at bedside also. Both in agreement for the LP not to be done at present. Called Patient's nurse, Conor whom came down to see patient and ended up taking patient back to his room.

## 2024-09-24 NOTE — PROGRESS NOTES
[facility] INPATIENT PROGRESS NOTE         [facility] CORONARY CARE    2024      PATIENT IDENTIFICATION:  Name: Anthony Gallegos ADMIT: 2024   : 1945  PCP: Keshav Eason MD    MRN: [id number] LOS: 7 days   AGE/SEX: 79 y.o. male  ROOM: [facility]                     LOS 7    Reason for visit: Respiratory failure with sepsis      SUBJECTIVE:      No new issues overnight.  Still with weak cough.  Frail and weak appearing.  Coarse breath sounds bilaterally.      Objective   OBJECTIVE:    Vital Sign Min/Max for last 24 hours  Temp  Min: 96.8 °F (36 °C)  Max: 98.2 °F (36.8 °C)   BP  Min: 135/71  Max: 176/86   Pulse  Min: 107  Max: 116   Resp  Min: 12  Max: 42   SpO2  Min: 92 %  Max: 98 %   No data recorded   No data recorded    Vitals:    24 0004 24 0021 24 0435 24 0740   BP: 157/79  150/80 135/71   BP Location: Left arm  Left arm Right arm   Patient Position: Lying  Lying Lying   Pulse: 109 112 110    Resp: (!) 42 (!) 32 12 20   Temp: 97.7 °F (36.5 °C)  97.4 °F (36.3 °C) 97.5 °F (36.4 °C)   TempSrc: Oral  Axillary Axillary   SpO2: 92% 94% 93%    Weight:                24  0600 24  0600 24  0700   Weight: 74.8 kg (164 lb 14.5 oz) 82.6 kg (182 lb 1.6 oz) 85 kg (187 lb 6.3 oz)       Body mass index is 23.42 kg/m².                    FiO2 (%): 26 %     Body mass index is 23.42 kg/m².    Intake/Output Summary (Last 24 hours) at 2024 0918  Last data filed at 2024 1856  Gross per 24 hour   Intake --   Output 650 ml   Net -650 ml         Exam:  GEN:  No distress, appears stated age.  Very weak appearing  EYES:   PERRL, anicteric sclerae  ENT:    External ears/nose normal, OP clear  NECK:  No adenopathy, midline trachea  LUNGS: Normal chest on inspection, palpation and scattered coarse breath sounds at bases on auscultation  CV:  Normal S1S2, without murmur  ABD:  Nontender, nondistended, no hepatosplenomegaly, +BS  EXT:  No edema.  No cyanosis or  clubbing.  No mottling and normal cap refill.    Assessment     Scheduled meds:  albumin human, 25 g, Intravenous, Q6H  atorvastatin, 40 mg, Per PEG Tube, Daily  cefTRIAXone, 2,000 mg, Intravenous, Q24H  chlorhexidine, 15 mL, Mouth/Throat, Q12H  dextrose, 100 mL, Intravenous, Q6H  enoxaparin, 40 mg, Subcutaneous, Q24H  furosemide, 20 mg, Intravenous, Q12H  guaifenesin, 200 mg, Per PEG Tube, Q6H  ipratropium-albuterol, 3 mL, Nebulization, 4x Daily - RT  levothyroxine, 25 mcg, Per PEG Tube, Q AM  pantoprazole, 40 mg, Intravenous, Q24H  potassium chloride, 10 mEq, Intravenous, Q1H  potassium phosphate, 15 mmol, Intravenous, Once  senna-docusate sodium, 2 tablet, Per PEG Tube, BID  sodium hypochlorite, , Topical, Q12H  vancomycin, 1,000 mg, Intravenous, Q12H      IV meds:                      dexmedetomidine, 0.2-1.5 mcg/kg/hr, Last Rate: Stopped (09/21/24 0252)  norepinephrine, 0.02-0.3 mcg/kg/min, Last Rate: Stopped (09/21/24 0716)  Pharmacy to dose vancomycin,   vasopressin, 0.03 Units/min, Last Rate: Stopped (09/18/24 1200)      Data Review:  Results from last 7 days   Lab Units 09/24/24  0421 09/23/24  0948 09/22/24  2142 09/22/24  0814 09/21/24  1902 09/21/24  0710 09/20/24  1122 09/20/24  0728 09/19/24  2212 09/19/24  0446 09/18/24  0708   SODIUM mmol/L 154* 154*  --   --   --   --   --  147*  --  145 140   POTASSIUM mmol/L 3.5 3.9 4.0 3.6 3.3* 3.4*   < > 2.7*   < > 2.3* 3.1*   CHLORIDE mmol/L 121* 125*  --   --   --   --   --  117*  --  114* 109*   CO2 mmol/L 27.0 25.9  --   --   --   --   --  24.1  --  24.3 21.9*   BUN mg/dL 20 17  --   --   --   --   --  11  --  10 17   CREATININE mg/dL 0.40* 0.43*  --  0.42*  --  0.36*  --  0.38*  --  0.41* 0.51*   GLUCOSE mg/dL 80 130*  --   --   --   --   --  126*  --  92 63*   CALCIUM mg/dL 8.3* 8.5*  --   --   --   --   --  7.7*  --  7.3* 7.7*    < > = values in this interval not displayed.         Estimated Creatinine Clearance: 177.1 mL/min (A) (by C-G formula based on  SCr of 0.4 mg/dL (L)).  Results from last 7 days   Lab Units 09/24/24  0421 09/23/24  0948 09/20/24  0728 09/19/24  0446 09/18/24  0709   WBC 10*3/mm3 13.94* 14.91* 12.56* 15.38* 13.50*   HEMOGLOBIN g/dL 8.7* 8.5* 8.5* 8.2* 8.3*   PLATELETS 10*3/mm3 242 219 241 252 248         Results from last 7 days   Lab Units 09/24/24  0421 09/23/24  0948   ALT (SGPT) U/L 24 27   AST (SGOT) U/L 39 45*     Results from last 7 days   Lab Units 09/21/24  1949 09/20/24  0921 09/19/24  0341 09/18/24  0357   PH, ARTERIAL pH units 7.511* 7.475* 7.503* 7.420   PO2 ART mm Hg 148.3* 66.3* 67.0* 109.1*   PCO2, ARTERIAL mm Hg 35.7 32.6* 28.8* 36.0   HCO3 ART mmol/L 28.6* 24.0 22.6 23.3     Results from last 7 days   Lab Units 09/22/24  0814 09/18/24  0709 09/17/24  2308 09/17/24  1638 09/17/24  1127   PROCALCITONIN ng/mL 6.77*  --   --   --   --    LACTATE mmol/L  --  2.0 2.7* 4.9* 5.2*         Glucose   Date/Time Value Ref Range Status   09/24/2024 0631 78 70 - 130 mg/dL Final   09/24/2024 0620 52 (L) 70 - 130 mg/dL Final   09/24/2024 0022 77 70 - 130 mg/dL Final   09/24/2024 0007 63 (L) 70 - 130 mg/dL Final   09/23/2024 1557 88 70 - 130 mg/dL Final   09/23/2024 1145 114 70 - 130 mg/dL Final   09/23/2024 0640 120 70 - 130 mg/dL Final         Imaging reviewed  Most recent chest x-ray 9/19 reviewed    Microbiology reviewed  MRSA and E. coli growing in respiratory culture from 9/17          Active Hospital Problems    Diagnosis  POA    **Pneumonia of both lungs due to methicillin resistant Staphylococcus aureus (MRSA) [J15.212]  Yes    Hypophosphatemia [E83.39]  Yes    Hypernatremia [E87.0]  No    Pneumonia, aspiration [J69.0]  Yes    Acute respiratory failure with hypoxia and hypercapnia [J96.01, J96.02]  Yes    NSTEMI (non-ST elevated myocardial infarction) [I21.4]  Yes    Subacute osteomyelitis of left foot [M86.272]  Yes    Hypokalemia [E87.6]  No    Septic shock [A41.9, R65.21]  Yes    Hypothyroidism (acquired) [E03.9]  Yes    Sacral  wound [S31.000A]  Yes    Hyperlipidemia [E78.5]  Yes    Feeding by G-tube [Z93.1]  Not Applicable    Indwelling Mariscal catheter present [Z97.8]  Not Applicable    Anemia [D64.9]  Yes    Failure to thrive in adult [R62.7]  Yes    Dysphagia [R13.10]  Yes    GERD without esophagitis [K21.9]  Yes    DISH (disseminated idiopathic skeletal hyperostosis) [M48.10]  Yes    Immobility [Z74.09]  Yes    Essential hypertension [I10]  Yes    Ankylosing spondylitis [M45.9]  Yes      Resolved Hospital Problems   No resolved problems to display.         ASSESSMENT:  Septic shock, present on admission  Bilateral pneumonia, MRSA/E. coli  Acute hypoxemic and hypercapnic respiratory failure, requiring mechanical ventilation 9/16  Metabolic encephalopathy  Lactic acidosis, resolved  Elevated troponin  Severe protein calorie malnutrition/hypoalbuminemia (albumin 2)  Slightly elevated troponin, probably non-STEMI type II  Hypokalemia  Ankylosing spondylitis: On methotrexate as outpatient  Chronic indwelling Mariscal catheter  Dysphagia with PEG tube  Failure to thrive  History of osteomyelitis  ESBL in urine culture 8/17/2024  Sacral pressure ulcer: Debrided 8/20/2024  Hypothyroidism  Chronic anemia      PLAN:  Encouraged flutter valve and incentive spirometer to help with pulmonary toilet.  Weaning oxygen as able.   PT/ST/OT.  Telemetry bed overflow and defer all nonpulmonary medical management to hospitalist service.  Needs increased work with therapy.      Dez Thakkar MD  Pulmonary and Critical Care Medicine  [facility], [facility]  9/24/2024    09:18 EDT     Addendum:  Called by nursing staff to be notified that patient's respiratory status had changed.  I saw him earlier this morning.  Around 10 AM or so nursing says that he received Ativan ordered by neurology presumably to be given for use with LP.  He got the LP around 1:00.  This was done at bedside.  Since then apparently his oxygen requirements have increased and now he  has been on nonrebreather for some time and I was called to just be notified.  He is more somnolent.  Recommending switch to BiPAP now and get a chest x-ray.    Electronically signed by Dez Thakkar MD, 09/24/24, 3:47 PM EDT.

## 2024-09-25 NOTE — PROGRESS NOTES
Name: Anthony Gallegos ADMIT: 2024   : 1945  PCP: Keshav Eason MD    MRN: [id number] LOS: 8 days   AGE/SEX: 79 y.o. male  ROOM: [facility]     Subjective   Subjective   Pt still unable to answer any questions today. When I yell his name he will open eyes and lift eyebrows. Cannot follow commands. Will respond only to noxious stimuli this afternoon. Upper airway noise better on NIPPV.       Objective   Objective   Vital Signs  Temp:  [97.3 °F (36.3 °C)-98.2 °F (36.8 °C)] 97.4 °F (36.3 °C)  Heart Rate:  [] 108  Resp:  [20-51] 37  BP: (106-155)/(54-77) 135/64  SpO2:  [92 %-100 %] 97 %  on  Flow (L/min):  [12] 12;   Device (Oxygen Therapy): NPPV/NIV  Body mass index is 22.18 kg/m².    (No change in exam today)    Physical Exam  Vitals and nursing note reviewed.   Constitutional:       General: He is not in acute distress.     Appearance: He is ill-appearing. He is not toxic-appearing or diaphoretic.      Comments: Somnolent   HENT:      Head: Normocephalic.      Nose: Nose normal.      Mouth/Throat:      Mouth: Mucous membranes are dry.      Pharynx: Oropharynx is clear.   Eyes:      General: No scleral icterus.        Right eye: No discharge.         Left eye: No discharge.      Conjunctiva/sclera: Conjunctivae normal.   Cardiovascular:      Rate and Rhythm: Normal rate and regular rhythm.      Pulses: Normal pulses.   Pulmonary:      Effort: Pulmonary effort is normal. No respiratory distress.      Breath sounds: Normal breath sounds. No wheezing or rales.      Comments: Anteriorly   Abdominal:      General: Bowel sounds are normal. There is no distension.      Palpations: Abdomen is soft.      Tenderness: There is no abdominal tenderness.      Comments: PEG looks fine   Genitourinary:     Comments: Yellow urine in forde bag  Musculoskeletal:         General: Swelling (1+ in all extremities) present.      Cervical back: Neck supple.      Comments: Heel boots in place  SCDs in place  Dressing to  left heel   Skin:     General: Skin is warm and dry.      Capillary Refill: Capillary refill takes less than 2 seconds.      Coloration: Skin is not jaundiced.   Neurological:      Comments: Cannot follow commands   Psychiatric:      Comments: Unable to assess       Results Review     I reviewed the patient's new clinical results.  Results from last 7 days   Lab Units 09/25/24 0447 09/24/24 0421 09/23/24  0948 09/20/24  0728   WBC 10*3/mm3 10.58 13.94* 14.91* 12.56*   HEMOGLOBIN g/dL 7.7* 8.7* 8.5* 8.5*   PLATELETS 10*3/mm3 228 242 219 241     Results from last 7 days   Lab Units 09/25/24 0447 09/24/24 2035 09/24/24 0421 09/23/24 0948 09/22/24  2142 09/22/24  0814 09/20/24  1122 09/20/24  0728   SODIUM mmol/L 155*  --  154* 154*  --   --   --  147*   POTASSIUM mmol/L 3.5 3.6 3.5 3.9   < > 3.6   < > 2.7*   CHLORIDE mmol/L 120*  --  121* 125*  --   --   --  117*   CO2 mmol/L 28.0  --  27.0 25.9  --   --   --  24.1   BUN mg/dL 18  --  20 17  --   --   --  11   CREATININE mg/dL 0.53*  --  0.40* 0.43*  --  0.42*   < > 0.38*   GLUCOSE mg/dL 102*  --  80 130*  --   --   --  126*   EGFR mL/min/1.73 101.3  --  110.3 107.9  --  108.7   < > 112.0    < > = values in this interval not displayed.     Results from last 7 days   Lab Units 09/25/24 0447 09/24/24 0421 09/23/24  0948   ALBUMIN g/dL 2.9* 1.9* 1.7*   BILIRUBIN mg/dL 0.6 0.5 0.4   ALK PHOS U/L 145* 171* 177*   AST (SGOT) U/L 26 39 45*   ALT (SGPT) U/L 16 24 27     Results from last 7 days   Lab Units 09/25/24 0447 09/24/24 2035 09/24/24  0421 09/23/24  0948 09/21/24  1902 09/20/24  0728   CALCIUM mg/dL 8.5*  --  8.3* 8.5*  --  7.7*   ALBUMIN g/dL 2.9*  --  1.9* 1.7*  --   --    MAGNESIUM mg/dL 1.9  --  2.0 2.0 1.8  --    PHOSPHORUS mg/dL 2.0* 2.4* 1.8* 0.3*  --   --      Results from last 7 days   Lab Units 09/22/24  0814   PROCALCITONIN ng/mL 6.77*     Glucose   Date/Time Value Ref Range Status   09/25/2024 1131 98 70 - 130 mg/dL Final   09/25/2024 0400 86 70  - 130 mg/dL Final   09/25/2024 0036 112 70 - 130 mg/dL Final   09/25/2024 0008 58 (L) 70 - 130 mg/dL Final   09/25/2024 0007 52 (L) 70 - 130 mg/dL Final   09/24/2024 2101 91 70 - 130 mg/dL Final   09/24/2024 2022 50 (L) 70 - 130 mg/dL Final       XR Chest 1 View    Result Date: 9/24/2024  Interval worsening of extensive bilateral pulmonary opacities, with suspected pleural effusions.  This report was finalized on 9/24/2024 4:19 PM by Dr. Len Kennedy M.D on Workstation: [id number]      MRI Brain With & Without Contrast    Result Date: 9/24/2024  1. Images are significantly degraded by motion artifact. No restricted diffusion is seen to suggest acute infarct. The patient is noted to have mild diffuse pachymeningeal enhancement. Appearance is nonspecific, but potentially may be infectious or inflammatory in etiology. Correlation with clinical presentation is recommended.   This report was finalized on 9/24/2024 4:49 AM by Dr. Kirsten Peña M.D on Workstation: [id number]       I have personally reviewed all medications:  Scheduled Medications  atorvastatin, 40 mg, Per PEG Tube, Daily  cefTRIAXone, 2,000 mg, Intravenous, Q24H  chlorhexidine, 15 mL, Mouth/Throat, Q12H  enoxaparin, 40 mg, Subcutaneous, Q24H  furosemide, 20 mg, Intravenous, Q12H  guaifenesin, 200 mg, Per PEG Tube, Q6H  ipratropium-albuterol, 3 mL, Nebulization, 4x Daily - RT  levothyroxine, 25 mcg, Per PEG Tube, Q AM  pantoprazole, 40 mg, Intravenous, Q24H  senna-docusate sodium, 2 tablet, Per PEG Tube, BID  sodium hypochlorite, , Topical, Q12H  vancomycin, 1,000 mg, Intravenous, Q12H    Infusions  dextrose, 30 mL/hr, Last Rate: 30 mL/hr (09/25/24 1216)  Pharmacy to dose vancomycin,     Diet  NPO Diet NPO Type: Tube Feeding    I have personally reviewed:  [x]  Laboratory   [x]  Microbiology   [x]  Radiology   [x]  EKG/Telemetry  []  Cardiology/Vascular   []  Pathology    []  Records       Assessment/Plan     Active Hospital Problems    Diagnosis   POA    **Pneumonia of both lungs due to methicillin resistant Staphylococcus aureus (MRSA) [J15.212]  Yes    Hypophosphatemia [E83.39]  Yes    Hypernatremia [E87.0]  No    Pneumonia, aspiration [J69.0]  Yes    Acute respiratory failure with hypoxia and hypercapnia [J96.01, J96.02]  Yes    NSTEMI (non-ST elevated myocardial infarction) [I21.4]  Yes    Subacute osteomyelitis of left foot [M86.272]  Yes    Hypokalemia [E87.6]  No    Septic shock [A41.9, R65.21]  Yes    Hypothyroidism (acquired) [E03.9]  Yes    Sacral wound [S31.000A]  Yes    Hyperlipidemia [E78.5]  Yes    Feeding by G-tube [Z93.1]  Not Applicable    Indwelling Forde catheter present [Z97.8]  Not Applicable    Anemia [D64.9]  Yes    Failure to thrive in adult [R62.7]  Yes    Dysphagia [R13.10]  Yes    GERD without esophagitis [K21.9]  Yes    DISH (disseminated idiopathic skeletal hyperostosis) [M48.10]  Yes    Immobility [Z74.09]  Yes    Essential hypertension [I10]  Yes    Ankylosing spondylitis [M45.9]  Yes      Resolved Hospital Problems   No resolved problems to display.       78yo gentleman with ankylosing spondylitis, HTN, BPH, chronic forde, h/o ESBL E.coli UTI, GERD, HLD, [place], ACD, hypoT4, O/P dysphagia chronically on tube feeds, and recent admission with multiple pressure wounds and left calcaneal osteomyelitis, who presented to ER from SNF with altered mental status, hypotension, and acute hypoxic resp failure. He was intubated in ER and admitted to ICU with severe sepsis due to aspiration PNA. We were asked to assume care when pt transitioned out of ICU.     Septic shock  Bilateral aspiration PNA due to MRSA and E.coli  Acute hypoxic and hypercapnic resp failure  Mgmt per Pulm and ID  S/p pressors  Extubated 9/20  PCT down to 6.8 from 68 on admission  ID recommended continue Rocephin and Vanc through 9/24  Continue O2 and pulm hygiene efforts  Blood cultures NGTD, urinary Ag's neg, RVP neg  Resp culture grew MRSA and E.coli  Pt had abrupt  worsening of resp status 9/24--suspect aspiration event in the AM  D/w Pulm, back on Rocephin and Vanc now, back on NIPPV    Type 2 NSTEMI  No ACS    Multiple pressure wounds  Left calcaneal osteomyelitis  WOCN following, d/w them this AM and they note all wounds appear worse compared to last admission, they do not feel any require debridement at this time but will continue to follow  Palliative wound care to left calcaneal wound/osteo per plan last admission from ID and Pod    Hypokalemia  Replaced  Mg++ level fine    Hypernatremia  Intravascular volume deficit with severe anasarca from third-spacing due to malnutrition  Defer to Renal--appreciate their attention to pt  Currently trying D5 and Lasix    Immobility  Chronic forde  Dysphagia, PEG tube  Protein calorie malnutrition, severe  Continue PT  Good UOP  Continue tube feeds, Nutrition following  SLP recommends repeat VFSS when pt able to comply  Previous admission had been on modified diet but did not like to eat (only drank fluids) so his nutrition is primarily via tube feeds--suspect he will have to remain strict NPO from now on    Dysarthria/Aphasia  Speech is really unintelligible and that was new for him since last admissoin  Neuro consulted  MRI brain suggested possible inflammatory or infectious process  LP 9/24  EEG okay    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  Full code.  Discussed with patient and Dr. Thakkar. No family present.  Prognosis poor. Palliative Care has d/w family again and they continue to want everything done--those are pt's wishes per daughters.  Anticipate discharge back to SNU facility, timing yet to be determined.  Expected Discharge Date: 9/30/2024; Expected Discharge Time:       Walter Mancia MD  [facility]  09/25/24  13:51 EDT

## 2024-09-25 NOTE — PROGRESS NOTES
[facility] Clinical Pharmacy Services: Vancomycin Monitoring Note    Anthony Gallegos is a 79 y.o. male who is on day 9 of pharmacy to dose vancomycin for pneumonia. (end date through 9/24 originally, extended due to worsening resp status)     Current Vancomycin Dose:    1000 mg IV every  12  hours    Updated Cultures and Sensitivities:   9/16: Blood Cx- NGTD (3d)  9/17: Urine cath- No Growth  9/17 resp cx: MRSA, E.coli  9/17: MRSA DNA probe +     Results from last 7 days   Lab Units 09/19/24  2212   VANCOMYCIN TR mcg/mL 12.70     Vitals/Labs  Ht:  ; Wt: 80.5 kg (177 lb 7.5 oz)   Temp Readings from Last 1 Encounters:   09/25/24 97.4 °F (36.3 °C) (Axillary)     Estimated Creatinine Clearance: 126.6 mL/min (A) (by C-G formula based on SCr of 0.53 mg/dL (L)).     Results from last 7 days   Lab Units 09/25/24  0447 09/24/24  0421 09/23/24  0948   CREATININE mg/dL 0.53* 0.40* 0.43*   WBC 10*3/mm3 10.58 13.94* 14.91*     Assessment/Plan    Vancomycin Dose: resume previous dose 1000 mg IV every 12 hours now; provides a predicted  mg/L.hr   Next Level Date and Time: Check trough prior to next dose at 0030 on 9/26.   We will continue to monitor patient changes and renal function     Thank you for involving pharmacy in this patient's care. Please contact pharmacy with any questions or concerns.       Mell Girard, PharmD  Clinical Pharmacist

## 2024-09-25 NOTE — PLAN OF CARE
Patient follow some commands with speech unintelligible. Pt arouse to voice/physical stimuli. Pt sustaining RR at 40s-50s on BIPAP entire shift. Patient still on sinus tachycardia from 110 to 130s/140s. Resumed PEG tube feedings, tolerated well. turned Q2 hours. Multiple dressing changed this am. Isolation maintained. Mariscal care with large urine output this shift on diuretics. K+ replaced per protocol. Two amps D50 given on shift for hypoglycemia; anasarca on albumin replacement. Plan of care ongoing.

## 2024-09-25 NOTE — PROGRESS NOTES
[facility] Progress Note      Patient Name: Anthoyn Gallegos  : 1945  MRN: [id number]  Primary Care Physician:  Keshav Eason MD  Date of admission: 2024    Subjective     Interval History:     Overnight no event  Patient currently on BiPAP  Poorly interactive  offering no complaint  Mariscal catheter in place with urine output of 6.6 L    Review of Systems:   As noted above    Objective     Vitals:   Temp:  [97.3 °F (36.3 °C)-98.2 °F (36.8 °C)] 97.9 °F (36.6 °C)  Heart Rate:  [] 114  Resp:  [20-51] 43  BP: (106-155)/(54-77) 135/64  Flow (L/min):  [10-12] 12    Intake/Output Summary (Last 24 hours) at 2024 0822  Last data filed at 2024 0600  Gross per 24 hour   Intake 947 ml   Output 6625 ml   Net -5678 ml       Physical Exam:      Constitutional: Chronically ill and deconditioned with bilateral temporal wasting  HEENT: Sclera anicteric, poor oral hygiene  Neck: Supple, no thyromegaly, no lymphadenopathy, trachea at midline, no JVD  Respiratory: Fine crackles bibasal  Cardiovascular: Tachycardic  Gastrointestinal: Positive bowel sounds, abdomen is soft, nontender and nondistended  : No palpable bladder  Musculoskeletal: Severe edema    Scheduled Meds:     atorvastatin, 40 mg, Per PEG Tube, Daily  chlorhexidine, 15 mL, Mouth/Throat, Q12H  enoxaparin, 40 mg, Subcutaneous, Q24H  furosemide, 20 mg, Intravenous, Q12H  guaifenesin, 200 mg, Per PEG Tube, Q6H  ipratropium-albuterol, 3 mL, Nebulization, 4x Daily - RT  levothyroxine, 25 mcg, Per PEG Tube, Q AM  pantoprazole, 40 mg, Intravenous, Q24H  potassium & sodium phosphates, 2 packet, Oral, Once  potassium chloride, 40 mEq, Oral, Q4H  senna-docusate sodium, 2 tablet, Per PEG Tube, BID  sodium hypochlorite, , Topical, Q12H      IV Meds:   dextrose, 30 mL/hr        Results Reviewed:   I have personally reviewed the results from the time of this admission to 2024 08:22 EDT     Results from last 7 days   Lab  Units 09/25/24 0447 09/24/24 2035 09/24/24 0421 09/23/24  0948   SODIUM mmol/L 155*  --  154* 154*   POTASSIUM mmol/L 3.5 3.6 3.5 3.9   CHLORIDE mmol/L 120*  --  121* 125*   CO2 mmol/L 28.0  --  27.0 25.9   BUN mg/dL 18  --  20 17   CREATININE mg/dL 0.53*  --  0.40* 0.43*   CALCIUM mg/dL 8.5*  --  8.3* 8.5*   BILIRUBIN mg/dL 0.6  --  0.5 0.4   ALK PHOS U/L 145*  --  171* 177*   ALT (SGPT) U/L 16  --  24 27   AST (SGOT) U/L 26  --  39 45*   GLUCOSE mg/dL 102*  --  80 130*       Estimated Creatinine Clearance: 126.6 mL/min (A) (by C-G formula based on SCr of 0.53 mg/dL (L)).    Results from last 7 days   Lab Units 09/25/24 0447 09/24/24 2035 09/24/24 0421 09/23/24  0948   MAGNESIUM mg/dL 1.9  --  2.0 2.0   PHOSPHORUS mg/dL 2.0* 2.4* 1.8* 0.3*             Results from last 7 days   Lab Units 09/25/24 0447 09/24/24 0421 09/23/24  0948 09/20/24  0728 09/19/24  0446   WBC 10*3/mm3 10.58 13.94* 14.91* 12.56* 15.38*   HEMOGLOBIN g/dL 7.7* 8.7* 8.5* 8.5* 8.2*   PLATELETS 10*3/mm3 228 242 219 241 252             Assessment / Plan     ASSESSMENT:    -Hypernatremia with calculated water deficit > 4.5  L.  Unfortunately with severe anasarca from  third spacing from malnutrition.  With decreased effective intravascular volume.  Unfortunately attempt to provide IV fluids likely will decompensate his weak respiratory pattern w HIGH risk of PULMONARY EDEMA . .  Continue PEG tube water flushes  -History of nephrotic syndrome earlier this year UPCR 8.4 and 3.7 g will repeat likely contributing factor for volume overload  -History of septic shock secondary to bilateral aspiration pneumonia due to MRSA and E. coli with hypoxic respiratory failure followed by pulmonary and primary team regarding the patient mechanical support extubated 9/20/2024  -Multiple sacral pressure ulcers followed by wound care and primary team  -Chronic immobility syndrome with dysphagia and PEG tube placement with malnutrition followed by  dietary  -Ankylosing spondylitis previously on methotrexate currently on hold likely will not have a significant long-term benefit on his mobility with high risk of complications and agree with primary team with discontinuing long-term    PLAN:    -Challenging case due to massive volume overload while intravascular depleted w hypernatremia   -Multiple attempts and treatment options have been tried to control his hypernatremia unfortunately with little response patient is on BiPAP, unable to tolerate high-dose of IV fluids.  I will attempt to continue his low-dose Lasix and add D5 IV low rate for the next 24 hours to determine if we can improve his sodium.   -Appreciate palliative care recommendations  -Patient overall with poor prognosis    Discussed with nursing staff    Thank you for involving us in the care of Anthony Gallegos.  Please feel free to call with any questions.    Kishan Woo MD  09/25/24  08:22 EDT    [facility]  [phone]    Please note that portions of this note were completed with a voice recognition program.

## 2024-09-25 NOTE — PROGRESS NOTES
[facility] INPATIENT PROGRESS NOTE         [facility] CORONARY CARE    2024      PATIENT IDENTIFICATION:  Name: Anthony Gallegos ADMIT: 2024   : 1945  PCP: Keshav Eason MD    MRN: [id number] LOS: 8 days   AGE/SEX: 79 y.o. male  ROOM: [facility]                     LOS 8    Reason for visit: Respiratory failure with sepsis      SUBJECTIVE:      Back on NIPPV.  On 50% FiO2.  Tachypneic.  Coarse breath sounds bilaterally worse than yesterday's exam.  Tolerating tube feeds at 50 cc an hour.  Repeat ABG for follow-up.      Objective   OBJECTIVE:    Vital Sign Min/Max for last 24 hours  Temp  Min: 97.3 °F (36.3 °C)  Max: 98.2 °F (36.8 °C)   BP  Min: 106/54  Max: 155/73   Pulse  Min: 94  Max: 146   Resp  Min: 20  Max: 51   SpO2  Min: 92 %  Max: 100 %   No data recorded   Weight  Min: 80.5 kg (177 lb 7.5 oz)  Max: 80.5 kg (177 lb 7.5 oz)    Vitals:    24 0420 24 0558 24 0653 24 0700   BP: 135/64      BP Location: Left arm      Patient Position: Lying      Pulse: 116  114    Resp: 20  (!) 43    Temp: 97.8 °F (36.6 °C)   97.9 °F (36.6 °C)   TempSrc: Oral      SpO2:   98%    Weight:  80.5 kg (177 lb 7.5 oz)              24  0600 24  0700 24  0558   Weight: 82.6 kg (182 lb 1.6 oz) 85 kg (187 lb 6.3 oz) 80.5 kg (177 lb 7.5 oz)       Body mass index is 22.18 kg/m².                    FiO2 (%): 26 %     Body mass index is 22.18 kg/m².    Intake/Output Summary (Last 24 hours) at 2024 0844  Last data filed at 2024 0600  Gross per 24 hour   Intake 947 ml   Output 6625 ml   Net -5678 ml         Exam:  GEN:  No distress, appears stated age.  Very weak appearing  EYES:   PERRL, anicteric sclerae  ENT:    External ears/nose normal, OP clear  NECK:  No adenopathy, midline trachea  LUNGS: Normal chest on inspection, palpation and scattered coarse breath sounds at bases on auscultation  CV:  Normal S1S2, without murmur  ABD:  Nontender, nondistended, no  hepatosplenomegaly, +BS  EXT:  No edema.  No cyanosis or clubbing.  No mottling and normal cap refill.    Assessment     Scheduled meds:  atorvastatin, 40 mg, Per PEG Tube, Daily  cefTRIAXone, 2,000 mg, Intravenous, Q24H  chlorhexidine, 15 mL, Mouth/Throat, Q12H  enoxaparin, 40 mg, Subcutaneous, Q24H  furosemide, 20 mg, Intravenous, Q12H  guaifenesin, 200 mg, Per PEG Tube, Q6H  ipratropium-albuterol, 3 mL, Nebulization, 4x Daily - RT  levothyroxine, 25 mcg, Per PEG Tube, Q AM  pantoprazole, 40 mg, Intravenous, Q24H  potassium chloride, 40 mEq, Oral, Q4H  senna-docusate sodium, 2 tablet, Per PEG Tube, BID  sodium hypochlorite, , Topical, Q12H  vancomycin, 1,000 mg, Intravenous, Q12H      IV meds:                      dextrose, 30 mL/hr      Data Review:  Results from last 7 days   Lab Units 09/25/24  0447 09/24/24  2035 09/24/24  0421 09/23/24  0948 09/22/24  2142 09/22/24  0814 09/21/24  1902 09/21/24  0710 09/20/24  1122 09/20/24  0728 09/19/24  2212 09/19/24  0446   SODIUM mmol/L 155*  --  154* 154*  --   --   --   --   --  147*  --  145   POTASSIUM mmol/L 3.5 3.6 3.5 3.9 4.0 3.6   < > 3.4*   < > 2.7*   < > 2.3*   CHLORIDE mmol/L 120*  --  121* 125*  --   --   --   --   --  117*  --  114*   CO2 mmol/L 28.0  --  27.0 25.9  --   --   --   --   --  24.1  --  24.3   BUN mg/dL 18  --  20 17  --   --   --   --   --  11  --  10   CREATININE mg/dL 0.53*  --  0.40* 0.43*  --  0.42*  --  0.36*  --  0.38*  --  0.41*   GLUCOSE mg/dL 102*  --  80 130*  --   --   --   --   --  126*  --  92   CALCIUM mg/dL 8.5*  --  8.3* 8.5*  --   --   --   --   --  7.7*  --  7.3*    < > = values in this interval not displayed.         Estimated Creatinine Clearance: 126.6 mL/min (A) (by C-G formula based on SCr of 0.53 mg/dL (L)).  Results from last 7 days   Lab Units 09/25/24  0447 09/24/24  0421 09/23/24  0948 09/20/24  0728 09/19/24  0446   WBC 10*3/mm3 10.58 13.94* 14.91* 12.56* 15.38*   HEMOGLOBIN g/dL 7.7* 8.7* 8.5* 8.5* 8.2*   PLATELETS  10*3/mm3 228 242 219 241 252         Results from last 7 days   Lab Units 09/25/24  0447 09/24/24  0421 09/23/24  0948   ALT (SGPT) U/L 16 24 27   AST (SGOT) U/L 26 39 45*     Results from last 7 days   Lab Units 09/25/24  0830 09/24/24  1708 09/21/24  1949 09/20/24  0921 09/19/24  0341   PH, ARTERIAL pH units 7.482* 7.451* 7.511* 7.475* 7.503*   PO2 ART mm Hg 71.8* 81.0 148.3* 66.3* 67.0*   PCO2, ARTERIAL mm Hg 44.5 45.4* 35.7 32.6* 28.8*   HCO3 ART mmol/L 33.3* 31.6* 28.6* 24.0 22.6     Results from last 7 days   Lab Units 09/22/24  0814   PROCALCITONIN ng/mL 6.77*         Glucose   Date/Time Value Ref Range Status   09/25/2024 0400 86 70 - 130 mg/dL Final   09/25/2024 0036 112 70 - 130 mg/dL Final   09/25/2024 0008 58 (L) 70 - 130 mg/dL Final   09/25/2024 0007 52 (L) 70 - 130 mg/dL Final   09/24/2024 2101 91 70 - 130 mg/dL Final   09/24/2024 2022 50 (L) 70 - 130 mg/dL Final   09/24/2024 1820 72 70 - 130 mg/dL Final         Imaging reviewed  Most recent chest x-ray 9/19 reviewed    Chest x-ray 9/24: Worsening bilateral infiltrates              Microbiology reviewed  MRSA and E. coli growing in respiratory culture from 9/17          Active Hospital Problems    Diagnosis  POA    **Pneumonia of both lungs due to methicillin resistant Staphylococcus aureus (MRSA) [J15.212]  Yes    Hypophosphatemia [E83.39]  Yes    Hypernatremia [E87.0]  No    Pneumonia, aspiration [J69.0]  Yes    Acute respiratory failure with hypoxia and hypercapnia [J96.01, J96.02]  Yes    NSTEMI (non-ST elevated myocardial infarction) [I21.4]  Yes    Subacute osteomyelitis of left foot [M86.272]  Yes    Hypokalemia [E87.6]  No    Septic shock [A41.9, R65.21]  Yes    Hypothyroidism (acquired) [E03.9]  Yes    Sacral wound [S31.000A]  Yes    Hyperlipidemia [E78.5]  Yes    Feeding by G-tube [Z93.1]  Not Applicable    Indwelling Mariscal catheter present [Z97.8]  Not Applicable    Anemia [D64.9]  Yes    Failure to thrive in adult [R62.7]  Yes    Dysphagia  [R13.10]  Yes    GERD without esophagitis [K21.9]  Yes    DISH (disseminated idiopathic skeletal hyperostosis) [M48.10]  Yes    Immobility [Z74.09]  Yes    Essential hypertension [I10]  Yes    Ankylosing spondylitis [M45.9]  Yes      Resolved Hospital Problems   No resolved problems to display.         ASSESSMENT:  Septic shock, present on admission  Bilateral pneumonia, MRSA/E. coli  Acute hypoxemic and hypercapnic respiratory failure, requiring mechanical ventilation 9/16  Metabolic encephalopathy  Lactic acidosis, resolved  Elevated troponin  Severe protein calorie malnutrition/hypoalbuminemia (albumin 2)  Slightly elevated troponin, probably non-STEMI type II  Hypokalemia  Ankylosing spondylitis: On methotrexate as outpatient  Chronic indwelling Mariscal catheter  Dysphagia with PEG tube  Failure to thrive  History of osteomyelitis  ESBL in urine culture 8/17/2024  Sacral pressure ulcer: Debrided 8/20/2024  Hypothyroidism  Chronic anemia      PLAN:  NIPPV for increased work of breathing and respiratory support.  Keep head of the bed elevated for aspiration precautions.  Suspect his worsening clinical picture yesterday was due to excessive sedation for radiology procedure and patient aspirated again.  With worsening infiltrates will resume antibiotic.  Weaning oxygen as able.   LP results from yesterday pending.  PT/ST/OT.  Discussed with Dr. Mancia.  With worsening clinical status will have palliative care reach out to family again.  Overall very poor prognosis.    Discussed with multidisciplinary ICU team on rounds this morning.        CCT: 36 min    Dez Thakkar MD  Pulmonary and Critical Care Medicine  [facility], [facility]  9/25/2024    08:44 EDT

## 2024-09-25 NOTE — PROGRESS NOTES
DOS: 2024  NAME: Anthony Gallegos   : 1945  PCP: Keshav Eason MD  Chief Complaint   Patient presents with    Shortness of Breath     Stroke    Subjective: Patient resting in bed with non-rebreather mask still placed. Patient not communicating during exam or following any commands. Pt sustaining RR at 40s-50s on BIPAP entire shift. Patient still on sinus tachycardia from 110 to 130s/140s. He was following some commands for nursing staff but speech remains unintelligible. No acute events overnight. Patient sodium levels remain at 155.    Interval History  History taken from: patient    Objective:  Vital signs: /64 (BP Location: Left arm, Patient Position: Lying)   Pulse 108   Temp 97.4 °F (36.3 °C) (Axillary)   Resp (!) 37   Wt 80.5 kg (177 lb 7.5 oz)   SpO2 97%   BMI 22.18 kg/m²       Physical Exam:  GENERAL: ill-appearing, vitals reviewed   MS: lethargic and disoriented . Non-verbal on exam and still on non-rebreather mask. Not following any commands. No neglect.  CN: blinks to threat bilaterally, PERRL, EOMI, no facial droop  Motor: moves all extremities spontaneously  Sensory: withdrawals and grimaces to pain in all four extremities   Coordination: CALDERON    Results Review:     I reviewed the patient's new clinical results.    Current Medications:  Scheduled Medications:atorvastatin, 40 mg, Per PEG Tube, Daily  cefTRIAXone, 2,000 mg, Intravenous, Q24H  chlorhexidine, 15 mL, Mouth/Throat, Q12H  enoxaparin, 40 mg, Subcutaneous, Q24H  furosemide, 20 mg, Intravenous, Q12H  guaifenesin, 200 mg, Per PEG Tube, Q6H  ipratropium-albuterol, 3 mL, Nebulization, 4x Daily - RT  levothyroxine, 25 mcg, Per PEG Tube, Q AM  pantoprazole, 40 mg, Intravenous, Q24H  senna-docusate sodium, 2 tablet, Per PEG Tube, BID  sodium hypochlorite, , Topical, Q12H  vancomycin, 1,000 mg, Intravenous, Q12H      Infusions: dextrose, 30 mL/hr, Last Rate: 30 mL/hr (24 1216)  Pharmacy to dose vancomycin,       PRN  "Medications:    acetaminophen **OR** acetaminophen    senna-docusate sodium **AND** polyethylene glycol **AND** bisacodyl **AND** bisacodyl    Calcium Replacement - Follow Nurse / BPA Driven Protocol    dextrose    dextrose    fentaNYL citrate (PF)    glucagon (human recombinant)    Magnesium Standard Dose Replacement - Follow Nurse / BPA Driven Protocol    nitroglycerin    Pharmacy to dose vancomycin    Phosphorus Replacement - Follow Nurse / BPA Driven Protocol    Potassium Replacement - Follow Nurse / BPA Driven Protocol    Medications Reviewed:     Laboratory results:  Lab Results   Component Value Date    GLUCOSE 102 (H) 09/25/2024    CALCIUM 8.5 (L) 09/25/2024     (H) 09/25/2024    K 3.9 09/25/2024    CO2 28.0 09/25/2024     (H) 09/25/2024    BUN 18 09/25/2024    CREATININE 0.53 (L) 09/25/2024    EGFRIFAFRI 84 03/27/2018    EGFRIFNONA 69 03/27/2018    BCR 34.0 (H) 09/25/2024    ANIONGAP 7.0 09/25/2024     Lab Results   Component Value Date    WBC 10.58 09/25/2024    HGB 7.7 (L) 09/25/2024    HCT 24.5 (L) 09/25/2024    MCV 84.8 09/25/2024     09/25/2024          Lab Results   Component Value Date    INR 1.52 (H) 08/17/2024    INR 1.20 (H) 12/20/2023    PROTIME 18.6 (H) 08/17/2024    PROTIME 15.3 (H) 12/20/2023     Lab Results   Component Value Date    TSH 3.150 09/23/2024     No components found for: \"LDLCALC\"  Lab Results   Component Value Date    HGBA1C 5.80 (H) 01/23/2024     No components found for: \"B12\"    Review and interpretation of imaging:  Na 155  Creatinine 0.53  Glucose 102  Calcium 8.5  Phosphorus 2.0  Alk phos 145  Albumin 2.9  Hemoglobin 7.7      Impression:  Patient is an 79 y.o. male with PMH o HTN, HLD, ankylosing spondylitis, MARTITA, multiple decubitus ulcers, chronic forde, PEG tube dependency, and recent hospital admission for calcaneal osteomyelitis presents to the ED on 9/16 with AMS and found to be  hypoxic and hypotensive.  Labs were notable for WBC 6, lactate 6, " Pro-Carmelo 68, RPP negative, and lipase 8. CXR showed bilateral lung opacities. CT of the head did not show any acute process. He was intubated and admitted to the intensive care unit. He was started on vasopressors. His blood cultures are negative to date. His respiratory culture showed MRSA and E. coli. Patient  found to be septic secondary to bilateral aspiration pneumonia. He has since been treated wit meropenum discontinued on 9/19 and continues to Vancomycin and Rocephin through 9/24.     Neurology consulted for unintelligible speech. Speech concerning for encephalitis vs inflammatory process versus metabolic encephalopathy due to sepsis and extremely ill-appearing vs medication side effect versus electrolyte abnormality. Patient on robitussin, ceftriaxone, and fetanyl. Also had meropenum discontinued on 9/19. MRI brain concerning for mild diffuse pachymeningeal enhancement, nonspecific but may be infectious or inflammatory etiology. 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 Exam limited by patient motion. Plan was to perform LP but patient moving too much and not following commands so 2 mg of ativan given prior. Patient was non-responsive to verbal stimulation after and IR concerned unable to tolerate LP so was not performed. He was changed to non-rebreather mask. Patient remains afebrile and white count 13.94. Lumbar puncture performed at bedside by Dr. Ibarra. Patient CSF labs found to have slightly increase protein at 47.9, normal glucose, nucleated 3 cells in tube 1, tube 3 had 6320 RBC due to traumatic tap, glucose WNL at 63. Therefore, CSF results so far non-contributory to patient's symptoms.     Diagnoses:  Unintelligible speech  Abnormal MRI Brain concerning for possible infectious or inflammatory process  Bilateral Aspiration Pneumonia with acute respiratory failure with positive e.coli and MRSA respiratory cultures.   Sepsis   Hypernatremia  HLD    HTN  Chronic forde  PEG tube dependence.   Multiple decubitus ulcers  Recent hospitalization for left calcaneal ostomyelitis  Ankylosing spondylitis chronically on MTX        Plan:  CSF labs pending: ACE, [place] virus, cytomegalovirus, AFB, lyme, VDRL  Nephrology on board for multiple electrolyte imbalances.  Difficulty getting hypernatremia corrected with patient currently being volume overloaded likely contributing to symptoms  Pulmonology recommending goals of care discussion with family with overall poor prognosis.    I have discussed the above with the patient and family.  Yamilet Shipley PA-C  09/25/24  14:44 EDT        Pneumonia of both lungs due to methicillin resistant Staphylococcus aureus (MRSA)    Ankylosing spondylitis    Essential hypertension    Immobility    DISH (disseminated idiopathic skeletal hyperostosis)    GERD without esophagitis    Dysphagia    Failure to thrive in adult    Anemia    Indwelling Forde catheter present    Feeding by G-tube    Hyperlipidemia    Hypothyroidism (acquired)    Sacral wound    Septic shock    Pneumonia, aspiration    Acute respiratory failure with hypoxia and hypercapnia    NSTEMI (non-ST elevated myocardial infarction)    Subacute osteomyelitis of left foot    Hypokalemia    Hypophosphatemia    Hypernatremia

## 2024-09-25 NOTE — NURSING NOTE
I spoke with this patient's daughter/HCS Whit over the phone, to provide an update and to further discuss her goals of treatment. Given his significant decline and worsening condition, Whit does not want us to perform CPR, cardioversion or intubation. She does want to continue to treat medically with hope for recovery, but understands that he may continue to decline despite maximum intervention. She is agreeable to evaluate his response to treatment on a daily basis and make adjustments to meet his needs. Ultimately she would be agreeable to end-of-life comfort care if he continues to decline.   Dr Mancia and bedside RN Veronika notified, orders received.     I advised her of our availability and all questions answered.  We will continue to follow to provide support.

## 2024-09-26 NOTE — PROGRESS NOTES
[facility] Progress Note      Patient Name: Anthony Gallegos  : 1945  MRN: [id number]  Primary Care Physician:  Keshav Eason MD  Date of admission: 2024    Subjective     Interval History:     Overnight no event  Patient currently on BiPAP  Poorly interactive    Mariscal catheter in place 3.3 L    Review of Systems:   As noted above    Objective     Vitals:   Temp:  [97.4 °F (36.3 °C)-98 °F (36.7 °C)] 98 °F (36.7 °C)  Heart Rate:  [108-120] 109  Resp:  [16-43] 33  BP: (118-137)/(59-70) 122/59  Flow (L/min):  [6] 6    Intake/Output Summary (Last 24 hours) at 2024 0836  Last data filed at 2024 0500  Gross per 24 hour   Intake 2856 ml   Output 3325 ml   Net -469 ml       Physical Exam:      Constitutional: Chronically ill and deconditioned with bilateral temporal wasting  HEENT: Sclera anicteric, poor oral hygiene  Neck: Supple, no thyromegaly, no lymphadenopathy, trachea at midline, no JVD  Respiratory: Fine crackles bibasal  Cardiovascular: Tachycardic  Gastrointestinal: Positive bowel sounds, abdomen is soft, nontender and nondistended  : No palpable bladder  Musculoskeletal: Severe edema    Scheduled Meds:     atorvastatin, 40 mg, Per PEG Tube, Daily  cefTRIAXone, 2,000 mg, Intravenous, Q24H  chlorhexidine, 15 mL, Mouth/Throat, Q12H  enoxaparin, 40 mg, Subcutaneous, Q24H  furosemide, 20 mg, Intravenous, Q12H  guaifenesin, 200 mg, Per PEG Tube, Q6H  ipratropium-albuterol, 3 mL, Nebulization, 4x Daily - RT  levothyroxine, 25 mcg, Per PEG Tube, Q AM  pantoprazole, 40 mg, Intravenous, Q24H  potassium phosphate, 15 mmol, Intravenous, Once  senna-docusate sodium, 2 tablet, Per PEG Tube, BID  sodium hypochlorite, , Topical, Q12H  vancomycin, 750 mg, Intravenous, Q12H      IV Meds:   dextrose, 30 mL/hr, Last Rate: 30 mL/hr (24 1216)  Pharmacy to dose vancomycin,         Results Reviewed:   I have personally reviewed the results from the time of this admission to  9/26/2024 08:36 EDT     Results from last 7 days   Lab Units 09/26/24 0448 09/25/24 1409 09/25/24 0447 09/24/24 2035 09/24/24 0421 09/23/24  0948   SODIUM mmol/L 152*  --  155*  --  154* 154*   POTASSIUM mmol/L 3.9 3.9 3.5   < > 3.5 3.9   CHLORIDE mmol/L 118*  --  120*  --  121* 125*   CO2 mmol/L 28.0  --  28.0  --  27.0 25.9   BUN mg/dL 23  --  18  --  20 17   CREATININE mg/dL 0.45*  --  0.53*  --  0.40* 0.43*   CALCIUM mg/dL 8.1*  --  8.5*  --  8.3* 8.5*   BILIRUBIN mg/dL  --   --  0.6  --  0.5 0.4   ALK PHOS U/L  --   --  145*  --  171* 177*   ALT (SGPT) U/L  --   --  16  --  24 27   AST (SGOT) U/L  --   --  26  --  39 45*   GLUCOSE mg/dL 117*  --  102*  --  80 130*    < > = values in this interval not displayed.       Estimated Creatinine Clearance: 155.9 mL/min (A) (by C-G formula based on SCr of 0.45 mg/dL (L)).    Results from last 7 days   Lab Units 09/26/24 0448 09/25/24 1409 09/25/24 0447 09/24/24 2035 09/24/24 0421   MAGNESIUM mg/dL 2.2  --  1.9  --  2.0   PHOSPHORUS mg/dL 2.1* 2.3* 2.0*   < > 1.8*    < > = values in this interval not displayed.             Results from last 7 days   Lab Units 09/26/24 0448 09/25/24 0447 09/24/24 0421 09/23/24  0948 09/20/24  0728   WBC 10*3/mm3 11.00* 10.58 13.94* 14.91* 12.56*   HEMOGLOBIN g/dL 7.5* 7.7* 8.7* 8.5* 8.5*   PLATELETS 10*3/mm3 270 228 242 219 241             Assessment / Plan     ASSESSMENT:    -Hypernatremia with calculated water deficit > 3.5  L.  Unfortunately with severe anasarca from  third spacing from malnutrition.  With decreased effective intravascular volume.  Unfortunately attempt to provide IV fluids likely will decompensate his weak respiratory pattern w HIGH risk of PULMONARY EDEMA . .  Continue PEG tube water flushes  -History of nephrotic syndrome earlier this year UPCR 8.4 and 3.7 g will repeat likely contributing factor for volume overload  -History of septic shock secondary to bilateral aspiration pneumonia due to MRSA and E.  coli with hypoxic respiratory failure followed by pulmonary and primary team regarding the patient mechanical support extubated 9/20/2024  -Multiple sacral pressure ulcers followed by wound care and primary team  -Chronic immobility syndrome with dysphagia and PEG tube placement with malnutrition followed by dietary  -Ankylosing spondylitis previously on methotrexate currently on hold likely will not have a significant long-term benefit on his mobility with high risk of complications and agree with primary team with discontinuing long-term    PLAN:    -Patient  massive volume overload while intravascular depleted w hypernatremia , unable to tolerate aggressive IV fluid replacement due to weak respiratory pattern patient is currently on.  BiPAP  -Patient's sodium started to improve while on low dose of D5 and furosemide.  Volume status improving with diuretics.   -Appreciate palliative care recommendations  -Patient overall with poor prognosis      Thank you for involving us in the care of Anthony Gallegos.  Please feel free to call with any questions.    Kishan Woo MD  09/26/24  08:36 EDT    [facility]  [phone]    Please note that portions of this note were completed with a voice recognition program.

## 2024-09-26 NOTE — PROGRESS NOTES
[facility] INPATIENT PROGRESS NOTE         [facility] CORONARY CARE    2024      PATIENT IDENTIFICATION:  Name: Anthony Gallegos ADMIT: 2024   : 1945  PCP: Keshav Eason MD    MRN: [id number] LOS: 9 days   AGE/SEX: 79 y.o. male  ROOM: [facility]                     LOS 9    Reason for visit: Respiratory failure with sepsis      SUBJECTIVE:      On NIPPV.  On 40% FiO2 with saturations 99% at time of visit.  Coarse breath sounds on auscultation but improved compared to yesterday.  Tolerating tube feeds.  CODE STATUS changed to DNR.      Objective   OBJECTIVE:    Vital Sign Min/Max for last 24 hours  Temp  Min: 97.4 °F (36.3 °C)  Max: 98 °F (36.7 °C)   BP  Min: 118/68  Max: 137/64   Pulse  Min: 108  Max: 120   Resp  Min: 16  Max: 43   SpO2  Min: 92 %  Max: 98 %   No data recorded   Weight  Min: 84.2 kg (185 lb 10 oz)  Max: 84.2 kg (185 lb 10 oz)    Vitals:    24 0600 24 0700 24 0803 24 0820   BP: 129/61  122/59    BP Location:       Patient Position:       Pulse: 110  110 109   Resp:   (!) 35 (!) 33   Temp:  98 °F (36.7 °C)     TempSrc:  Axillary     SpO2: 98%  98% 92%   Weight:                24  0700 24  0558 24  0500   Weight: 85 kg (187 lb 6.3 oz) 80.5 kg (177 lb 7.5 oz) 84.2 kg (185 lb 10 oz)       Body mass index is 23.2 kg/m².                    FiO2 (%): 26 %     Body mass index is 23.2 kg/m².    Intake/Output Summary (Last 24 hours) at 2024 0922  Last data filed at 2024 0500  Gross per 24 hour   Intake 2856 ml   Output 3325 ml   Net -469 ml         Exam:  GEN:  No distress, appears stated age.  Very weak appearing  EYES:   PERRL, anicteric sclerae  ENT:    External ears/nose normal, OP clear  NECK:  No adenopathy, midline trachea  LUNGS: Normal chest on inspection, palpation and scattered coarse breath sounds at bases on auscultation  CV:  Normal S1S2, without murmur  ABD:  Nontender, nondistended, no hepatosplenomegaly,  +BS  EXT:  No edema.  No cyanosis or clubbing.  No mottling and normal cap refill.    Assessment     Scheduled meds:  atorvastatin, 40 mg, Per PEG Tube, Daily  cefTRIAXone, 2,000 mg, Intravenous, Q24H  chlorhexidine, 15 mL, Mouth/Throat, Q12H  enoxaparin, 40 mg, Subcutaneous, Q24H  furosemide, 20 mg, Intravenous, Q12H  guaifenesin, 200 mg, Per PEG Tube, Q6H  ipratropium-albuterol, 3 mL, Nebulization, 4x Daily - RT  levothyroxine, 25 mcg, Per PEG Tube, Q AM  pantoprazole, 40 mg, Intravenous, Q24H  potassium phosphate, 15 mmol, Intravenous, Once  senna-docusate sodium, 2 tablet, Per PEG Tube, BID  sodium hypochlorite, , Topical, Q12H  vancomycin, 750 mg, Intravenous, Q12H      IV meds:                      dextrose, 30 mL/hr, Last Rate: 30 mL/hr (09/25/24 1216)  Pharmacy to dose vancomycin,       Data Review:  Results from last 7 days   Lab Units 09/26/24  0448 09/25/24  1409 09/25/24  0447 09/24/24  2035 09/24/24  0421 09/23/24  0948 09/22/24  2142 09/22/24  0814 09/20/24  1122 09/20/24  0728   SODIUM mmol/L 152*  --  155*  --  154* 154*  --   --   --  147*   POTASSIUM mmol/L 3.9 3.9 3.5 3.6 3.5 3.9   < > 3.6   < > 2.7*   CHLORIDE mmol/L 118*  --  120*  --  121* 125*  --   --   --  117*   CO2 mmol/L 28.0  --  28.0  --  27.0 25.9  --   --   --  24.1   BUN mg/dL 23  --  18  --  20 17  --   --   --  11   CREATININE mg/dL 0.45*  --  0.53*  --  0.40* 0.43*  --  0.42*   < > 0.38*   GLUCOSE mg/dL 117*  --  102*  --  80 130*  --   --   --  126*   CALCIUM mg/dL 8.1*  --  8.5*  --  8.3* 8.5*  --   --   --  7.7*    < > = values in this interval not displayed.         Estimated Creatinine Clearance: 155.9 mL/min (A) (by C-G formula based on SCr of 0.45 mg/dL (L)).  Results from last 7 days   Lab Units 09/26/24  0448 09/25/24  0447 09/24/24  0421 09/23/24  0948 09/20/24  0728   WBC 10*3/mm3 11.00* 10.58 13.94* 14.91* 12.56*   HEMOGLOBIN g/dL 7.5* 7.7* 8.7* 8.5* 8.5*   PLATELETS 10*3/mm3 270 228 242 219 241         Results from  last 7 days   Lab Units 09/25/24  0447 09/24/24  0421 09/23/24  0948   ALT (SGPT) U/L 16 24 27   AST (SGOT) U/L 26 39 45*     Results from last 7 days   Lab Units 09/25/24  0830 09/24/24  1708 09/21/24  1949 09/20/24  0921   PH, ARTERIAL pH units 7.482* 7.451* 7.511* 7.475*   PO2 ART mm Hg 71.8* 81.0 148.3* 66.3*   PCO2, ARTERIAL mm Hg 44.5 45.4* 35.7 32.6*   HCO3 ART mmol/L 33.3* 31.6* 28.6* 24.0     Results from last 7 days   Lab Units 09/22/24  0814   PROCALCITONIN ng/mL 6.77*         Glucose   Date/Time Value Ref Range Status   09/26/2024 0457 133 (H) 70 - 130 mg/dL Final   09/26/2024 0004 101 70 - 130 mg/dL Final   09/25/2024 1607 104 70 - 130 mg/dL Final   09/25/2024 1131 98 70 - 130 mg/dL Final   09/25/2024 0400 86 70 - 130 mg/dL Final   09/25/2024 0036 112 70 - 130 mg/dL Final   09/25/2024 0008 58 (L) 70 - 130 mg/dL Final         Imaging reviewed  Most recent chest x-ray 9/19 reviewed    Chest x-ray 9/24: Worsening bilateral infiltrates        EEG 9/25 reviewed shows diffuse delta and theta slowing.  No epileptiform discharges.  Findings indicative of moderately severe encephalopathy.      Microbiology reviewed  MRSA and E. coli growing in respiratory culture from 9/17          Active Hospital Problems    Diagnosis  POA    **Pneumonia of both lungs due to methicillin resistant Staphylococcus aureus (MRSA) [J15.212]  Yes    Hypophosphatemia [E83.39]  Yes    Hypernatremia [E87.0]  No    Pneumonia, aspiration [J69.0]  Yes    Acute respiratory failure with hypoxia and hypercapnia [J96.01, J96.02]  Yes    NSTEMI (non-ST elevated myocardial infarction) [I21.4]  Yes    Subacute osteomyelitis of left foot [M86.272]  Yes    Hypokalemia [E87.6]  No    Septic shock [A41.9, R65.21]  Yes    Hypothyroidism (acquired) [E03.9]  Yes    Sacral wound [S31.000A]  Yes    Hyperlipidemia [E78.5]  Yes    Feeding by G-tube [Z93.1]  Not Applicable    Indwelling Mariscal catheter present [Z97.8]  Not Applicable    Anemia [D64.9]  Yes     Failure to thrive in adult [R62.7]  Yes    Dysphagia [R13.10]  Yes    GERD without esophagitis [K21.9]  Yes    DISH (disseminated idiopathic skeletal hyperostosis) [M48.10]  Yes    Immobility [Z74.09]  Yes    Essential hypertension [I10]  Yes    Ankylosing spondylitis [M45.9]  Yes      Resolved Hospital Problems   No resolved problems to display.         ASSESSMENT:  Septic shock, present on admission  Bilateral pneumonia, MRSA/E. coli  Acute hypoxemic and hypercapnic respiratory failure, requiring mechanical ventilation 9/16  Metabolic encephalopathy  Lactic acidosis, resolved  Elevated troponin  Severe protein calorie malnutrition/hypoalbuminemia (albumin 2)  Slightly elevated troponin, probably non-STEMI type II  Hypokalemia  Ankylosing spondylitis: On methotrexate as outpatient  Chronic indwelling Mariscal catheter  Dysphagia with PEG tube  Failure to thrive  History of osteomyelitis  ESBL in urine culture 8/17/2024  Sacral pressure ulcer: Debrided 8/20/2024  Hypothyroidism  Chronic anemia      PLAN:  NIPPV for increased work of breathing and respiratory support.  Keep head of the bed elevated for aspiration precautions.  Suspect his worsening clinical picture yesterday was due to excessive sedation for radiology procedure and patient aspirated again.  With worsening infiltrates will resume antibiotic.  Weaning oxygen as able.   LP results from yesterday pending.  PT/ST/OT.  Overall very poor prognosis.  I would recommend comfort measures.    Discussed with multidisciplinary ICU team on rounds this morning.    DNR        Dez Thakkar MD  Pulmonary and Critical Care Medicine  [facility]  9/26/2024    09:22 EDT

## 2024-09-26 NOTE — TREATMENT PLAN
Spoke with patient's daughter Whit at bedside. Patient is awake and attempting to communicate but not able to speak. Daughter asking him if he is pain and she thinks he is. Primary RN to bring pain medicine for him.     We discussed goals of care, Whit wants to stay the course for right now and see how things go. She does understand option for palliative care, and I encouraged her to reach out for any needs or questions. Discussed with RN Jennifer.

## 2024-09-26 NOTE — PROGRESS NOTES
Nutrition Services    Patient Name: Anthony Gallegos  YOB: 1945  MRN: [id number]  Admission date: 9/16/2024    PROGRESS NOTE      Encounter Information: Pt laying in bed unable to provide a hx when I visited. Pt tolerating TF well at 70 ml/hr. TF held by nephrology 9/23 and resumed 9/24. Pt diuresing on IV lasix. Daughter at bedside said pt has not been c/o n/v or stomach pain.        PO Diet: NPO Diet NPO Type: Tube Feeding   PO Supplements: -   PO Intake:  -       Current nutrition support: Impact Peptide 1.5 at 75 ml/hr goal   Nutrition support review: Tolerating well at 70 ml/hr       Labs (reviewed below): Na 152, Phos 2.1       GI Function:  Last BM 9/22, pericolace given       Nutrition Intervention Updates: Advance TF to goal of 75 ml/hr       Results from last 7 days   Lab Units 09/26/24 0448 09/25/24 1409 09/25/24 0447 09/24/24 2035 09/24/24  0421 09/23/24  0948   SODIUM mmol/L 152*  --  155*  --  154* 154*   POTASSIUM mmol/L 3.9 3.9 3.5   < > 3.5 3.9   CHLORIDE mmol/L 118*  --  120*  --  121* 125*   CO2 mmol/L 28.0  --  28.0  --  27.0 25.9   BUN mg/dL 23  --  18  --  20 17   CREATININE mg/dL 0.45*  --  0.53*  --  0.40* 0.43*   CALCIUM mg/dL 8.1*  --  8.5*  --  8.3* 8.5*   BILIRUBIN mg/dL  --   --  0.6  --  0.5 0.4   ALK PHOS U/L  --   --  145*  --  171* 177*   ALT (SGPT) U/L  --   --  16  --  24 27   AST (SGOT) U/L  --   --  26  --  39 45*   GLUCOSE mg/dL 117*  --  102*  --  80 130*    < > = values in this interval not displayed.     Results from last 7 days   Lab Units 09/26/24 0448 09/25/24 1409 09/25/24 0447 09/24/24 2035 09/24/24  0421   MAGNESIUM mg/dL 2.2  --  1.9  --  2.0   PHOSPHORUS mg/dL 2.1*   < > 2.0*   < > 1.8*   HEMOGLOBIN g/dL 7.5*  --  7.7*  --  8.7*   HEMATOCRIT % 23.9*  --  24.5*  --  26.5*    < > = values in this interval not displayed.     COVID19   Date Value Ref Range Status   09/17/2024 Not Detected Not Detected - Ref. Range Final     Lab Results   Component  Value Date    HGBA1C 5.80 (H) 01/23/2024       RD to follow up per protocol.    Electronically signed by:  Leobardo Duron RDN, LD  09/26/24 12:53 EDT

## 2024-09-26 NOTE — PROGRESS NOTES
DOS: 2024  NAME: Anthony Gallegos   : 1945  PCP: Keshav Eason MD  Chief Complaint   Patient presents with    Shortness of Breath     Neurology    Subjective: No family at bedside.  No significant events overnight. Unable to obtain ROS d/t AMS/nonverbal. Code status changed to DNR. Pt seen in follow up today, however the problem is new to the examiner.      Objective:  Vital signs: /59   Pulse 109   Temp 97.7 °F (36.5 °C) (Oral)   Resp (!) 34   Wt 84.2 kg (185 lb 10 oz)   SpO2 100%   BMI 23.20 kg/m²       GEN: appears frail/chronically ill  HEENT: Temporal wasting noted  COR: RRR  Resp: Even and unlabored  Extremities: BLE edema    Neurological:   MS: Appears alert, regards, but not following any commands.  He did attempt to speak but no words came out.  CN: Blink to threat intact bilaterally, PERRL, conjugate gaze, extraocular movements intact, no facial droop  Motor: Moving extremities spontaneously but not against gravity  Sensory: Withdrawal/grimaces to noxious stimuli in all extremities  Coordination: Not following commands/unable to test    Scheduled Meds:atorvastatin, 40 mg, Per PEG Tube, Daily  cefTRIAXone, 2,000 mg, Intravenous, Q24H  chlorhexidine, 15 mL, Mouth/Throat, Q12H  enoxaparin, 40 mg, Subcutaneous, Q24H  furosemide, 20 mg, Intravenous, Q12H  guaifenesin, 200 mg, Per PEG Tube, Q6H  ipratropium-albuterol, 3 mL, Nebulization, 4x Daily - RT  levothyroxine, 25 mcg, Per PEG Tube, Q AM  pantoprazole, 40 mg, Intravenous, Q24H  senna-docusate sodium, 2 tablet, Per PEG Tube, BID  sodium hypochlorite, , Topical, Q12H  vancomycin, 750 mg, Intravenous, Q12H      Continuous Infusions:dextrose, 30 mL/hr, Last Rate: 30 mL/hr (24 1216)  Pharmacy to dose vancomycin,       PRN Meds:.  acetaminophen **OR** acetaminophen    senna-docusate sodium **AND** polyethylene glycol **AND** bisacodyl **AND** bisacodyl    Calcium Replacement - Follow Nurse / BPA Driven Protocol    dextrose     "dextrose    fentaNYL citrate (PF)    glucagon (human recombinant)    Magnesium Standard Dose Replacement - Follow Nurse / BPA Driven Protocol    nitroglycerin    Pharmacy to dose vancomycin    Phosphorus Replacement - Follow Nurse / BPA Driven Protocol    Potassium Replacement - Follow Nurse / BPA Driven Protocol    Laboratory results:  Lab Results   Component Value Date    GLUCOSE 117 (H) 09/26/2024    CALCIUM 8.1 (L) 09/26/2024     (H) 09/26/2024    K 3.9 09/26/2024    CO2 28.0 09/26/2024     (H) 09/26/2024    BUN 23 09/26/2024    CREATININE 0.45 (L) 09/26/2024    EGFRIFAFRI 84 03/27/2018    EGFRIFNONA 69 03/27/2018    BCR 51.1 (H) 09/26/2024    ANIONGAP 6.0 09/26/2024     Lab Results   Component Value Date    WBC 11.00 (H) 09/26/2024    HGB 7.5 (L) 09/26/2024    HCT 23.9 (L) 09/26/2024    MCV 82.4 09/26/2024     09/26/2024     No results found for: \"CHOL\"  Lab Results   Component Value Date    HDL 61 (H) 03/27/2018     Lab Results   Component Value Date    LDL 91 03/27/2018     Lab Results   Component Value Date    TRIG 49 03/27/2018          Review and interpretation of imaging:  EEG    Result Date: 9/25/2024  Date of onset: 9/25/2024 Date of offset: 9/25/2024 Indication: Altered mental status Technical description:  This is a 21 channel digital EEG recording that began on 1011 and ended on 1037.  Background:  The background consists of a posteriorly dominant rhythm that is notably absent.  Anteriorly, there is diffuse delta and theta activity.  There is fair spontaneous variability.  Hyperventilation not performed and photic stimulation did not induce an abnormal driving response.  There is no focal slowing.  There are no interictal epileptiform discharges and no electrographic seizures noted during the study.  EKG demonstrates rate between 100 and 120 bpm. Impression: This is an abnormal study due to the presence of diffuse delta and theta slowing.  There are no interictal epileptiform " discharges and no electrographic seizures.  These electrophysiologic findings are indicative of moderately severe encephalopathy.       XR Chest 1 View    Result Date: 9/24/2024  XR CHEST 1 VW-  HISTORY: Male who is 79 years-old, worsening respiratory status  TECHNIQUE: Frontal views of the chest  COMPARISON: 9/19/2024  FINDINGS: The heart size appears borderline. Aorta is calcified. Extensive bilateral pulmonary opacities show interval worsening, with mild to moderate bilateral pleural effusions suggested. No pneumothorax. Otherwise stable.      Impression: Interval worsening of extensive bilateral pulmonary opacities, with suspected pleural effusions.  This report was finalized on 9/24/2024 4:19 PM by Dr. Len Kennedy M.D on Workstation: [id number]       Impression:  79-year-old male with hypertension, hyperlipidemia, ankylosing spondylitis, MARTITA, multiple decubitus ulcers, chronic Mariscal, and PEG tube due to chronic dysphagia with recent hospital admission for calcaneal osteomyelitis who was admitted 9/16 with altered mental status and found to be hypoxic and hypotensive.  Chest x-ray showed bilateral lung opacities.  CT head showed no acute findings.  He was initially intubated but has been extubated.  Blood cultures have been negative to date.  Patient felt to be septic from bilateral aspiration pneumonia.    Neurology was consulted 9/23 for unintelligible speech noted after extubation on 9/28.  MRI brain 9/23 were motion degraded but no diffusion restriction suggest acute stroke noted however there was mild diffuse pachymeningeal enhancement, nonspecific in appearance but potentially infectious or inflammatory.  EEG 9/25 showed diffuse delta and theta slowing but no potential seizure activity.  CSF obtained 9/24 with 2.1 showing 3 nucleated cells with 712 RBCs, tube 3 showed 25 nucleated cells with 6320 RBCs, protein 47, glucose 63CSF obtained, traumatic tap with 2 1 showing cryptococcal antigen  negative    Diagnoses:  AMS/unintelligible speech  Abnormal MRI with diffuse pachymeningeal enhancement without obvious infection CSF  Bilateral aspiration pneumonia  Hypernatremia  Dysphagia status post PEG  Multiple decubitus ulcers  Ankylosing spondylitis chronically on MTX    Plan:  Follow-up additional CSF studies peripherally: ACE level, West Nile virus, CMV, Lyme  Follow-up serum copper level  Palliative following, recommend ongoing goals of care discussion.  No further workup needed from neurology standpoint at this time.  Will follow peripherally, please call with questions/concerns. Discussed with Dr. Ibarra today.

## 2024-09-26 NOTE — PROGRESS NOTES
Name: Anthony Gallegos ADMIT: 2024   : 1945  PCP: Keshav Eason MD    MRN: [id number] LOS: 9 days   AGE/SEX: 79 y.o. male  ROOM: [facility], [place]     Subjective   Subjective   Pt much more awake and alert today. Will smile and follow commands now.        Objective   Objective   Vital Signs  Temp:  [97.5 °F (36.4 °C)-98 °F (36.7 °C)] 97.7 °F (36.5 °C)  Heart Rate:  [109-120] 109  Resp:  [16-43] 34  BP: (118-137)/(59-70) 122/59  SpO2:  [92 %-100 %] 100 %  on  Flow (L/min):  [5-6] 5;   Device (Oxygen Therapy): high-flow nasal cannula;humidified  Body mass index is 23.2 kg/m².    (No change in exam today)    Physical Exam  Vitals and nursing note reviewed.   Constitutional:       General: He is not in acute distress.     Appearance: He is ill-appearing. He is not toxic-appearing or diaphoretic.   HENT:      Head: Normocephalic.      Nose: Nose normal.      Mouth/Throat:      Mouth: Mucous membranes are dry.      Pharynx: Oropharynx is clear.   Eyes:      General: No scleral icterus.        Right eye: No discharge.         Left eye: No discharge.      Conjunctiva/sclera: Conjunctivae normal.   Cardiovascular:      Rate and Rhythm: Normal rate and regular rhythm.      Pulses: Normal pulses.   Pulmonary:      Effort: Pulmonary effort is normal. No respiratory distress.      Breath sounds: Normal breath sounds. No wheezing or rales.      Comments: Anteriorly   Abdominal:      General: Bowel sounds are normal. There is no distension.      Palpations: Abdomen is soft.      Tenderness: There is no abdominal tenderness.      Comments: PEG looks fine   Genitourinary:     Comments: Yellow urine in forde bag  Musculoskeletal:         General: Swelling (1+ in all extremities) present.      Cervical back: Neck supple.      Comments: Heel boots in place  SCDs in place  Dressing to left heel   Skin:     General: Skin is warm and dry.      Capillary Refill: Capillary refill takes less than 2 seconds.      Coloration: Skin is  not jaundiced.   Neurological:      Mental Status: He is alert.      Comments: More awake and alert today, can follow commands, still struggling to speak  Strength in BUEs symmetric   Psychiatric:      Comments: Pleasant       Results Review     I reviewed the patient's new clinical results.  Results from last 7 days   Lab Units 09/26/24 0448 09/25/24 0447 09/24/24 0421 09/23/24  0948   WBC 10*3/mm3 11.00* 10.58 13.94* 14.91*   HEMOGLOBIN g/dL 7.5* 7.7* 8.7* 8.5*   PLATELETS 10*3/mm3 270 228 242 219     Results from last 7 days   Lab Units 09/26/24 0448 09/25/24 1409 09/25/24 0447 09/24/24 2035 09/24/24 0421 09/23/24  0948   SODIUM mmol/L 152*  --  155*  --  154* 154*   POTASSIUM mmol/L 3.9 3.9 3.5 3.6 3.5 3.9   CHLORIDE mmol/L 118*  --  120*  --  121* 125*   CO2 mmol/L 28.0  --  28.0  --  27.0 25.9   BUN mg/dL 23  --  18  --  20 17   CREATININE mg/dL 0.45*  --  0.53*  --  0.40* 0.43*   GLUCOSE mg/dL 117*  --  102*  --  80 130*   EGFR mL/min/1.73 106.4  --  101.3  --  110.3 107.9     Results from last 7 days   Lab Units 09/25/24 0447 09/24/24 0421 09/23/24  0948   ALBUMIN g/dL 2.9* 1.9* 1.7*   BILIRUBIN mg/dL 0.6 0.5 0.4   ALK PHOS U/L 145* 171* 177*   AST (SGOT) U/L 26 39 45*   ALT (SGPT) U/L 16 24 27     Results from last 7 days   Lab Units 09/26/24 0448 09/25/24 1409 09/25/24 0447 09/24/24 2035 09/24/24 0421 09/23/24  0948   CALCIUM mg/dL 8.1*  --  8.5*  --  8.3* 8.5*   ALBUMIN g/dL  --   --  2.9*  --  1.9* 1.7*   MAGNESIUM mg/dL 2.2  --  1.9  --  2.0 2.0   PHOSPHORUS mg/dL 2.1* 2.3* 2.0* 2.4* 1.8* 0.3*     Results from last 7 days   Lab Units 09/22/24  0814   PROCALCITONIN ng/mL 6.77*     Glucose   Date/Time Value Ref Range Status   09/26/2024 1143 111 70 - 130 mg/dL Final   09/26/2024 0457 133 (H) 70 - 130 mg/dL Final   09/26/2024 0004 101 70 - 130 mg/dL Final   09/25/2024 1607 104 70 - 130 mg/dL Final   09/25/2024 1131 98 70 - 130 mg/dL Final   09/25/2024 0400 86 70 - 130 mg/dL Final    09/25/2024 0036 112 70 - 130 mg/dL Final       XR Chest 1 View    Result Date: 9/24/2024  Interval worsening of extensive bilateral pulmonary opacities, with suspected pleural effusions.  This report was finalized on 9/24/2024 4:19 PM by Dr. Len Kennedy M.D on Workstation: [id number]       I have personally reviewed all medications:  Scheduled Medications  atorvastatin, 40 mg, Per PEG Tube, Daily  cefTRIAXone, 2,000 mg, Intravenous, Q24H  chlorhexidine, 15 mL, Mouth/Throat, Q12H  enoxaparin, 40 mg, Subcutaneous, Q24H  furosemide, 20 mg, Intravenous, Q12H  guaifenesin, 200 mg, Per PEG Tube, Q6H  ipratropium-albuterol, 3 mL, Nebulization, 4x Daily - RT  levothyroxine, 25 mcg, Per PEG Tube, Q AM  pantoprazole, 40 mg, Intravenous, Q24H  senna-docusate sodium, 2 tablet, Per PEG Tube, BID  sodium hypochlorite, , Topical, Q12H  vancomycin, 750 mg, Intravenous, Q12H    Infusions  dextrose, 30 mL/hr, Last Rate: 30 mL/hr (09/25/24 1216)  Pharmacy to dose vancomycin,     Diet  NPO Diet NPO Type: Tube Feeding    I have personally reviewed:  [x]  Laboratory   [x]  Microbiology   []  Radiology   [x]  EKG/Telemetry  []  Cardiology/Vascular   []  Pathology    []  Records       Assessment/Plan     Active Hospital Problems    Diagnosis  POA    **Pneumonia of both lungs due to methicillin resistant Staphylococcus aureus (MRSA) [J15.212]  Yes    Hypophosphatemia [E83.39]  Yes    Hypernatremia [E87.0]  No    Pneumonia, aspiration [J69.0]  Yes    Acute respiratory failure with hypoxia and hypercapnia [J96.01, J96.02]  Yes    NSTEMI (non-ST elevated myocardial infarction) [I21.4]  Yes    Subacute osteomyelitis of left foot [M86.272]  Yes    Hypokalemia [E87.6]  No    Septic shock [A41.9, R65.21]  Yes    Hypothyroidism (acquired) [E03.9]  Yes    Sacral wound [S31.000A]  Yes    Hyperlipidemia [E78.5]  Yes    Feeding by G-tube [Z93.1]  Not Applicable    Indwelling Mariscal catheter present [Z97.8]  Not Applicable    Anemia [D64.9]  Yes     Failure to thrive in adult [R62.7]  Yes    Dysphagia [R13.10]  Yes    GERD without esophagitis [K21.9]  Yes    DISH (disseminated idiopathic skeletal hyperostosis) [M48.10]  Yes    Immobility [Z74.09]  Yes    Essential hypertension [I10]  Yes    Ankylosing spondylitis [M45.9]  Yes      Resolved Hospital Problems   No resolved problems to display.       78yo gentleman with ankylosing spondylitis, HTN, BPH, chronic forde, h/o ESBL E.coli UTI, GERD, HLD, [place], ACD, hypoT4, O/P dysphagia chronically on tube feeds, and recent admission with multiple pressure wounds and left calcaneal osteomyelitis, who presented to ER from SNF with altered mental status, hypotension, and acute hypoxic resp failure. He was intubated in ER and admitted to ICU with severe sepsis due to aspiration PNA. We were asked to assume care when pt transitioned out of ICU.     Septic shock  Bilateral aspiration PNA due to MRSA and E.coli  Acute hypoxic and hypercapnic resp failure  Mgmt per Pulm and ID  S/p pressors  Extubated 9/20  PCT down to 6.8 from 68 on admission  ID recommended continue Rocephin and Vanc through 9/24  Continue O2 and pulm hygiene efforts  Blood cultures NGTD, urinary Ag's neg, RVP neg  Resp culture grew MRSA and E.coli  Pt had abrupt worsening of resp status 9/24--suspect aspiration event in the AM  D/w Pulm, back on Rocephin and Vanc now, back on NIPPV  Prognosis poor    Type 2 NSTEMI  No ACS    Multiple pressure wounds  Left calcaneal osteomyelitis  WOCN following, d/w them and they note all wounds appear worse compared to last admission, they do not feel any require debridement at this time but will continue to follow  Palliative wound care to left calcaneal wound/osteo per plan last admission from ID and Pod    Hypokalemia  Replaced  Mg++ level fine    Hypernatremia  Intravascular volume deficit with severe anasarca from third-spacing due to malnutrition  Defer to Renal--appreciate their attention to pt  Currently trying D5  and Lasix, Na+ down some today    Immobility  Chronic forde  Dysphagia, PEG tube  Protein calorie malnutrition, severe  Continue PT  Good UOP  Continue tube feeds, Nutrition following  SLP recommends repeat VFSS when pt able to comply  Previous admission had been on modified diet but did not like to eat (only drank fluids) so his nutrition is primarily via tube feeds--suspect he will have to remain strict NPO from now on    Dysarthria/Aphasia  Neuro consulted  MRI brain suggested possible inflammatory or infectious process  LP 9/24 negative for infection  EEG okay  Some improvement today in alertness and can follow commands, still can't speak    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  DNR.  Discussed with patient. No family present.  Prognosis poor. Palliative Care has d/w family again and they continue to want everything done--those are pt's wishes per daughters. They did agree to changing to DNR.  Anticipate discharge back to SNU facility, timing yet to be determined.  Expected Discharge Date: 9/30/2024; Expected Discharge Time:       Walter Mancia MD  [facility]  09/26/24  15:22 EDT

## 2024-09-26 NOTE — PROGRESS NOTES
[facility] Clinical Pharmacy Services: Vancomycin Monitoring Note    Anthony Gallegos is a 79 y.o. male who is on day 10 of pharmacy to dose vancomycin for Pneumonia.    Previous Vancomycin Dose:    750 mg IV every  12  hours    Updated Cultures and Sensitivities:   9/16: Blood Cx- NGTD (3d)  9/17: Urine cath- No Growth  9/17 resp cx: MRSA, E.coli  9/17: MRSA DNA probe +   9/24: AFB culture- NGTD, cryptococcal neg  9/24: West Nile in process    Results from last 7 days   Lab Units 09/25/24  2300 09/19/24  2212   VANCOMYCIN TR mcg/mL 22.40* 12.70     Vitals/Labs  Ht:  ; Wt: 84.2 kg (185 lb 10 oz)   Temp Readings from Last 1 Encounters:   09/26/24 98 °F (36.7 °C) (Axillary)     Estimated Creatinine Clearance: 155.9 mL/min (A) (by C-G formula based on SCr of 0.45 mg/dL (L)).     Results from last 7 days   Lab Units 09/26/24  0448 09/25/24  0447 09/24/24  0421   CREATININE mg/dL 0.45* 0.53* 0.40*   WBC 10*3/mm3 11.00* 10.58 13.94*     Assessment/Plan    Current Vancomycin Dose: 750 mg IV every 12 hours; provides a predicted  mg/L.hr   Next Level Date and Time: Vanc Trough on 9/27 at 1300  We will continue to monitor patient changes and renal function     Thank you for involving pharmacy in this patient's care. Please contact pharmacy with any questions or concerns.       Cecil Umaña, PharmD  Clinical Pharmacist

## 2024-09-26 NOTE — PLAN OF CARE
Goal Outcome Evaluation: Pt remains in CCU in critical care status on NIPPV. Pt intermittently following simple commands and mouthing full name when prompted. However, pt AO X1, for the most part pt is very withdrawn and not interacting with the environment at this time. Code status changed this shift following discussion with palliative care RN and pt daughter.

## 2024-09-26 NOTE — PROGRESS NOTES
[facility] Clinical Pharmacy Services: Vancomycin Monitoring Note    Anthony Gallegos is a 79 y.o. male who is on day 9 of pharmacy to dose vancomycin for Pneumonia.    Previous Vancomycin Dose:   1000 mg IV every  12  hours  Updated Cultures and Sensitivities:    Results from last 7 days   Lab Units 09/25/24  2300 09/19/24  2212   VANCOMYCIN TR mcg/mL 22.40* 12.70     Vitals/Labs  Ht:  ; Wt: 80.5 kg (177 lb 7.5 oz)   Temp Readings from Last 1 Encounters:   09/25/24 97.5 °F (36.4 °C) (Oral)     Estimated Creatinine Clearance: 126.6 mL/min (A) (by C-G formula based on SCr of 0.53 mg/dL (L)).        Results from last 7 days   Lab Units 09/25/24  0447 09/24/24  0421 09/23/24  0948   CREATININE mg/dL 0.53* 0.40* 0.43*   WBC 10*3/mm3 10.58 13.94* 14.91*     Assessment/Plan    Current Vancomycin Dose: 750 mg IV every  12  hours; provides a predicted  mg/L.hr   Next Level Date and Time: Will defer ordering Vancomycin Trough to the Clinical Pharmacist in the morning.  We will continue to monitor patient changes and renal function     Thank you for involving pharmacy in this patient's care. Please contact pharmacy with any questions or concerns.       Jasper Ríos [facility]  Clinical Pharmacist

## 2024-09-27 NOTE — PROGRESS NOTES
[facility] INPATIENT PROGRESS NOTE         [facility] CORONARY CARE    2024      PATIENT IDENTIFICATION:  Name: Anthony Gallegos ADMIT: 2024   : 1945  PCP: Keshav Eason MD    MRN: [id number] LOS: 10 days   AGE/SEX: 79 y.o. male  ROOM: [facility]                     LOS 10    Reason for visit: Respiratory failure with sepsis      SUBJECTIVE:      On nasal cannula supplemental oxygen at 7 L at time of visit with saturations 98%.  Very weak appearing.  Minimally responsive this morning.  Withdraws to noxious stimuli.  Confused.  Does not follow commands.      Objective   OBJECTIVE:    Vital Sign Min/Max for last 24 hours  Temp  Min: 97.3 °F (36.3 °C)  Max: 97.9 °F (36.6 °C)   BP  Min: 98/48  Max: 126/49   Pulse  Min: 100  Max: 130   Resp  Min: 28  Max: 39   SpO2  Min: 87 %  Max: 100 %   No data recorded   Weight  Min: 79.2 kg (174 lb 9.7 oz)  Max: 79.2 kg (174 lb 9.7 oz)    Vitals:    24 0600 24 0630 24 0705 24 0710   BP: 124/58      BP Location:       Patient Position:       Pulse: 104 104 104 104   Resp:   (!) 31    Temp:       TempSrc:       SpO2: 99% 99% 99% 99%   Weight:                24  0558 24  0500 24  0500   Weight: 80.5 kg (177 lb 7.5 oz) 84.2 kg (185 lb 10 oz) 79.2 kg (174 lb 9.7 oz)       Body mass index is 21.82 kg/m².                    FiO2 (%): 26 %     Body mass index is 21.82 kg/m².    Intake/Output Summary (Last 24 hours) at 2024 0843  Last data filed at 2024 0500  Gross per 24 hour   Intake 1996 ml   Output 3625 ml   Net -1629 ml         Exam:  GEN:  Lethargic, appears stated age.  Very weak appearing  EYES:   PERRL, anicteric sclerae  ENT:    External ears/nose normal, OP clear  NECK:  No adenopathy, midline trachea  LUNGS: Normal chest on inspection, palpation and scattered coarse breath sounds at bases on auscultation  CV:  Normal S1S2, without murmur  ABD:  Nontender, nondistended, no hepatosplenomegaly,  +BS  EXT:  No edema.  No cyanosis or clubbing.  No mottling and normal cap refill.    Assessment     Scheduled meds:  atorvastatin, 40 mg, Per PEG Tube, Daily  cefTRIAXone, 2,000 mg, Intravenous, Q24H  chlorhexidine, 15 mL, Mouth/Throat, Q12H  enoxaparin, 40 mg, Subcutaneous, Q24H  furosemide, 20 mg, Intravenous, Q12H  guaifenesin, 200 mg, Per PEG Tube, Q6H  ipratropium-albuterol, 3 mL, Nebulization, 4x Daily - RT  levothyroxine, 25 mcg, Per PEG Tube, Q AM  pantoprazole, 40 mg, Intravenous, Q24H  senna-docusate sodium, 2 tablet, Per PEG Tube, BID  sodium hypochlorite, , Topical, Q12H  vancomycin, 750 mg, Intravenous, Q12H      IV meds:                      dextrose, 30 mL/hr, Last Rate: 30 mL/hr (09/25/24 1216)  Pharmacy to dose vancomycin,       Data Review:  Results from last 7 days   Lab Units 09/27/24  0656 09/26/24  0448 09/25/24  1409 09/25/24  0447 09/24/24  2035 09/24/24  0421 09/23/24  0948   SODIUM mmol/L 151* 152*  --  155*  --  154* 154*   POTASSIUM mmol/L 3.6 3.9 3.9 3.5 3.6 3.5 3.9   CHLORIDE mmol/L 115* 118*  --  120*  --  121* 125*   CO2 mmol/L 31.8* 28.0  --  28.0  --  27.0 25.9   BUN mg/dL 28* 23  --  18  --  20 17   CREATININE mg/dL 0.55* 0.45*  --  0.53*  --  0.40* 0.43*   GLUCOSE mg/dL 117* 117*  --  102*  --  80 130*   CALCIUM mg/dL 8.0* 8.1*  --  8.5*  --  8.3* 8.5*         Estimated Creatinine Clearance: 120 mL/min (A) (by C-G formula based on SCr of 0.55 mg/dL (L)).  Results from last 7 days   Lab Units 09/27/24  0656 09/26/24  0448 09/25/24  0447 09/24/24  0421 09/23/24  0948   WBC 10*3/mm3 11.34* 11.00* 10.58 13.94* 14.91*   HEMOGLOBIN g/dL 7.1* 7.5* 7.7* 8.7* 8.5*   PLATELETS 10*3/mm3 302 270 228 242 219         Results from last 7 days   Lab Units 09/25/24  0447 09/24/24  0421 09/23/24  0948   ALT (SGPT) U/L 16 24 27   AST (SGOT) U/L 26 39 45*     Results from last 7 days   Lab Units 09/25/24  0830 09/24/24  1708 09/21/24  1949 09/20/24  0921   PH, ARTERIAL pH units 7.482* 7.451* 7.511*  7.475*   PO2 ART mm Hg 71.8* 81.0 148.3* 66.3*   PCO2, ARTERIAL mm Hg 44.5 45.4* 35.7 32.6*   HCO3 ART mmol/L 33.3* 31.6* 28.6* 24.0     Results from last 7 days   Lab Units 09/22/24  0814   PROCALCITONIN ng/mL 6.77*         Glucose   Date/Time Value Ref Range Status   09/27/2024 0640 133 (H) 70 - 130 mg/dL Final   09/26/2024 2340 118 70 - 130 mg/dL Final   09/26/2024 1525 99 70 - 130 mg/dL Final   09/26/2024 1143 111 70 - 130 mg/dL Final   09/26/2024 0457 133 (H) 70 - 130 mg/dL Final   09/26/2024 0004 101 70 - 130 mg/dL Final   09/25/2024 1607 104 70 - 130 mg/dL Final         Imaging reviewed  Most recent chest x-ray 9/19 reviewed    Chest x-ray 9/24: Worsening bilateral infiltrates        EEG 9/25 reviewed shows diffuse delta and theta slowing.  No epileptiform discharges.  Findings indicative of moderately severe encephalopathy.      Microbiology reviewed  MRSA and E. coli growing in respiratory culture from 9/17          Active Hospital Problems    Diagnosis  POA    **Pneumonia of both lungs due to methicillin resistant Staphylococcus aureus (MRSA) [J15.212]  Yes    Hypophosphatemia [E83.39]  Yes    Hypernatremia [E87.0]  No    Pneumonia, aspiration [J69.0]  Yes    Acute respiratory failure with hypoxia and hypercapnia [J96.01, J96.02]  Yes    NSTEMI (non-ST elevated myocardial infarction) [I21.4]  Yes    Subacute osteomyelitis of left foot [M86.272]  Yes    Hypokalemia [E87.6]  No    Septic shock [A41.9, R65.21]  Yes    Hypothyroidism (acquired) [E03.9]  Yes    Sacral wound [S31.000A]  Yes    Hyperlipidemia [E78.5]  Yes    Feeding by G-tube [Z93.1]  Not Applicable    Indwelling Mariscal catheter present [Z97.8]  Not Applicable    Anemia [D64.9]  Yes    Failure to thrive in adult [R62.7]  Yes    Dysphagia [R13.10]  Yes    GERD without esophagitis [K21.9]  Yes    DISH (disseminated idiopathic skeletal hyperostosis) [M48.10]  Yes    Immobility [Z74.09]  Yes    Essential hypertension [I10]  Yes    Ankylosing  spondylitis [M45.9]  Yes      Resolved Hospital Problems   No resolved problems to display.         ASSESSMENT:  Septic shock, present on admission  Bilateral pneumonia, MRSA/E. coli  Acute hypoxemic and hypercapnic respiratory failure, requiring mechanical ventilation 9/16  Metabolic encephalopathy  Lactic acidosis, resolved  Elevated troponin  Severe protein calorie malnutrition/hypoalbuminemia (albumin 2)  Slightly elevated troponin, probably non-STEMI type II  Hypokalemia  Ankylosing spondylitis: On methotrexate as outpatient  Chronic indwelling Mariscal catheter  Dysphagia with PEG tube  Failure to thrive  History of osteomyelitis  ESBL in urine culture 8/17/2024  Sacral pressure ulcer: Debrided 8/20/2024  Hypothyroidism  Chronic anemia      PLAN:  Keep head of the bed elevated for aspiration precautions.  Wean oxygen as able.  With worsening infiltrates will resumed antibiotic.  LP results pending.  Overall very poor prognosis.  Patient is DNR.  I would recommend comfort measures.    Discussed with multidisciplinary ICU team on rounds this morning.            Dez Thakkar MD  Pulmonary and Critical Care Medicine  [facility], [facility]  9/27/2024    08:43 EDT

## 2024-09-27 NOTE — PROGRESS NOTES
[facility] Clinical Pharmacy Services: Vancomycin Monitoring Note    Anthony Gallegos is a 79 y.o. male who is on day 11 of pharmacy to dose vancomycin for Pneumonia.    Previous Vancomycin Dose:    750 mg IV every  12  hours    Updated Cultures and Sensitivities:   9/16: Blood Cx- NGTD (5d)  9/17: Urine cath- No Growth  9/17 resp cx: MRSA, E.coli  9/17: MRSA DNA probe +   9/24: AFB culture- NGTD, cryptococcal neg  9/24: West Nile neg  9/24: Lyme in process    Results from last 7 days   Lab Units 09/27/24  1342 09/25/24  2300   VANCOMYCIN TR mcg/mL 14.90 22.40*     Vitals/Labs  Ht:  ; Wt: 79.2 kg (174 lb 9.7 oz)   Temp Readings from Last 1 Encounters:   09/27/24 97.6 °F (36.4 °C) (Oral)     Estimated Creatinine Clearance: 120 mL/min (A) (by C-G formula based on SCr of 0.55 mg/dL (L)).     Results from last 7 days   Lab Units 09/27/24  0656 09/26/24  0448 09/25/24  0447   CREATININE mg/dL 0.55* 0.45* 0.53*   WBC 10*3/mm3 11.34* 11.00* 10.58     Assessment/Plan    Current Vancomycin Dose: 750 mg IV every 12 hours; provides a predicted  mg/L.hr   Vancomycin set to end on 9/30. Will not schedule any additional levels unless duration of therapy or renal function changes.   We will continue to monitor patient changes and renal function     Thank you for involving pharmacy in this patient's care. Please contact pharmacy with any questions or concerns.       Cecil Umaña, PharmD  Clinical Pharmacist

## 2024-09-27 NOTE — TREATMENT PLAN
No family at bedside today when I visited. No significant change in patient's status. I did speak with daughter yesterday, she is aware of her options and has contact information for palliative care team if needed. Will sign off at this time. Please re consult if needs or goals change.

## 2024-09-27 NOTE — NURSING NOTE
CWCN: We see patient for existing injuries since admission, Prognosis poor, Rn stated waiting for family to discuss Palliative Care. Please re-consult for any additional needs.

## 2024-09-27 NOTE — PROGRESS NOTES
Name: Anthony Gallegos ADMIT: 2024   : 1945  PCP: Keshav Eason MD    MRN: [id number] LOS: 10 days   AGE/SEX: 79 y.o. male  ROOM: [facility]     Subjective   Subjective   Pt worse today. Not responding to me. RR up.        Objective   Objective   Vital Signs  Temp:  [97.3 °F (36.3 °C)-97.9 °F (36.6 °C)] 97.6 °F (36.4 °C)  Heart Rate:  [100-130] 104  Resp:  [28-39] 31  BP: ()/(41-68) 124/58  SpO2:  [87 %-100 %] 99 %  on  Flow (L/min):  [3-10] 8;   Device (Oxygen Therapy): high-flow nasal cannula  Body mass index is 21.82 kg/m².    Physical Exam  Vitals and nursing note reviewed.   Constitutional:       General: He is not in acute distress.     Appearance: He is ill-appearing. He is not toxic-appearing or diaphoretic.      Comments: Eyes open but won't respond to me   HENT:      Head: Normocephalic.      Nose: Nose normal.      Mouth/Throat:      Mouth: Mucous membranes are dry.      Pharynx: Oropharynx is clear.   Eyes:      General: No scleral icterus.        Right eye: No discharge.         Left eye: No discharge.      Conjunctiva/sclera: Conjunctivae normal.   Cardiovascular:      Rate and Rhythm: Regular rhythm. Tachycardia present.      Pulses: Normal pulses.   Pulmonary:      Breath sounds: Normal breath sounds.      Comments: Anteriorly   Breathing shallow, RR up  Abdominal:      General: Bowel sounds are normal. There is no distension.      Palpations: Abdomen is soft.      Comments: PEG looks fine   Genitourinary:     Comments: Yellow urine in forde bag  Musculoskeletal:         General: Swelling (1+ in all extremities) present.      Cervical back: Neck supple.      Comments: Heel boots in place  SCDs in place  Dressing to left heel   Skin:     General: Skin is warm and dry.      Capillary Refill: Capillary refill takes less than 2 seconds.      Coloration: Skin is not jaundiced.   Neurological:      Comments: Not alert today, cannot follow commands   Psychiatric:      Comments: Unable to  assess       Results Review     I reviewed the patient's new clinical results.  Results from last 7 days   Lab Units 09/27/24  0656 09/26/24 0448 09/25/24 0447 09/24/24 0421   WBC 10*3/mm3 11.34* 11.00* 10.58 13.94*   HEMOGLOBIN g/dL 7.1* 7.5* 7.7* 8.7*   PLATELETS 10*3/mm3 302 270 228 242     Results from last 7 days   Lab Units 09/27/24 0656 09/26/24 0448 09/25/24 1409 09/25/24 0447 09/24/24 2035 09/24/24  0421   SODIUM mmol/L 151* 152*  --  155*  --  154*   POTASSIUM mmol/L 3.6 3.9 3.9 3.5   < > 3.5   CHLORIDE mmol/L 115* 118*  --  120*  --  121*   CO2 mmol/L 31.8* 28.0  --  28.0  --  27.0   BUN mg/dL 28* 23  --  18  --  20   CREATININE mg/dL 0.55* 0.45*  --  0.53*  --  0.40*   GLUCOSE mg/dL 117* 117*  --  102*  --  80   EGFR mL/min/1.73 100.2 106.4  --  101.3  --  110.3    < > = values in this interval not displayed.     Results from last 7 days   Lab Units 09/25/24 0447 09/24/24 0421 09/23/24  0948   ALBUMIN g/dL 2.9* 1.9* 1.7*   BILIRUBIN mg/dL 0.6 0.5 0.4   ALK PHOS U/L 145* 171* 177*   AST (SGOT) U/L 26 39 45*   ALT (SGPT) U/L 16 24 27     Results from last 7 days   Lab Units 09/27/24 0656 09/26/24 1555 09/26/24 0448 09/25/24 1409 09/25/24 0447 09/24/24 2035 09/24/24 0421 09/23/24  0948   CALCIUM mg/dL 8.0*  --  8.1*  --  8.5*  --  8.3* 8.5*   ALBUMIN g/dL  --   --   --   --  2.9*  --  1.9* 1.7*   MAGNESIUM mg/dL 2.0  --  2.2  --  1.9  --  2.0 2.0   PHOSPHORUS mg/dL 3.9 2.9 2.1* 2.3* 2.0*   < > 1.8* 0.3*    < > = values in this interval not displayed.     Results from last 7 days   Lab Units 09/22/24  0814   PROCALCITONIN ng/mL 6.77*     Glucose   Date/Time Value Ref Range Status   09/27/2024 0640 133 (H) 70 - 130 mg/dL Final   09/26/2024 2340 118 70 - 130 mg/dL Final   09/26/2024 1525 99 70 - 130 mg/dL Final   09/26/2024 1143 111 70 - 130 mg/dL Final   09/26/2024 0457 133 (H) 70 - 130 mg/dL Final   09/26/2024 0004 101 70 - 130 mg/dL Final   09/25/2024 1607 104 70 - 130 mg/dL Final       No  radiology results for the last day    I have personally reviewed all medications:  Scheduled Medications  atorvastatin, 40 mg, Per PEG Tube, Daily  cefTRIAXone, 2,000 mg, Intravenous, Q24H  chlorhexidine, 15 mL, Mouth/Throat, Q12H  enoxaparin, 40 mg, Subcutaneous, Q24H  furosemide, 20 mg, Intravenous, Q12H  guaifenesin, 200 mg, Per PEG Tube, Q6H  ipratropium-albuterol, 3 mL, Nebulization, 4x Daily - RT  levothyroxine, 25 mcg, Per PEG Tube, Q AM  pantoprazole, 40 mg, Intravenous, Q24H  senna-docusate sodium, 2 tablet, Per PEG Tube, BID  sodium hypochlorite, , Topical, Q12H  vancomycin, 750 mg, Intravenous, Q12H    Infusions  dextrose, 30 mL/hr, Last Rate: 30 mL/hr (09/25/24 1216)  Pharmacy to dose vancomycin,     Diet  NPO Diet NPO Type: Tube Feeding    I have personally reviewed:  [x]  Laboratory   [x]  Microbiology   []  Radiology   [x]  EKG/Telemetry  []  Cardiology/Vascular   []  Pathology    []  Records       Assessment/Plan     Active Hospital Problems    Diagnosis  POA    **Pneumonia of both lungs due to methicillin resistant Staphylococcus aureus (MRSA) [J15.212]  Yes    Hypophosphatemia [E83.39]  Yes    Hypernatremia [E87.0]  No    Pneumonia, aspiration [J69.0]  Yes    Acute respiratory failure with hypoxia and hypercapnia [J96.01, J96.02]  Yes    NSTEMI (non-ST elevated myocardial infarction) [I21.4]  Yes    Subacute osteomyelitis of left foot [M86.272]  Yes    Hypokalemia [E87.6]  No    Septic shock [A41.9, R65.21]  Yes    Hypothyroidism (acquired) [E03.9]  Yes    Sacral wound [S31.000A]  Yes    Hyperlipidemia [E78.5]  Yes    Feeding by G-tube [Z93.1]  Not Applicable    Indwelling Mariscal catheter present [Z97.8]  Not Applicable    Anemia [D64.9]  Yes    Failure to thrive in adult [R62.7]  Yes    Dysphagia [R13.10]  Yes    GERD without esophagitis [K21.9]  Yes    DISH (disseminated idiopathic skeletal hyperostosis) [M48.10]  Yes    Immobility [Z74.09]  Yes    Essential hypertension [I10]  Yes    Ankylosing  spondylitis [M45.9]  Yes      Resolved Hospital Problems   No resolved problems to display.       78yo gentleman with ankylosing spondylitis, HTN, BPH, chronic forde, h/o ESBL E.coli UTI, GERD, HLD, [place], ACD, hypoT4, O/P dysphagia chronically on tube feeds, and recent admission with multiple pressure wounds and left calcaneal osteomyelitis, who presented to ER from SNF with altered mental status, hypotension, and acute hypoxic resp failure. He was intubated in ER and admitted to ICU with severe sepsis due to aspiration PNA. We were asked to assume care when pt transitioned out of ICU.     Septic shock  Bilateral aspiration PNA due to MRSA and E.coli  Acute hypoxic and hypercapnic resp failure  Mgmt per Pulm and ID  S/p pressors  Extubated 9/20  PCT down to 6.8 from 68 on admission  ID recommended continue Rocephin and Vanc through 9/24  Continue O2 and pulm hygiene efforts  Blood cultures NGTD, urinary Ag's neg, RVP neg  Resp culture grew MRSA and E.coli  Pt had abrupt worsening of resp status 9/24--suspect aspiration event in the AM  D/w Pulm, back on Rocephin and Vanc now, off NIPPV now  Prognosis poor    Type 2 NSTEMI  No ACS    Multiple pressure wounds  Left calcaneal osteomyelitis  WOCN following, d/w them and they note all wounds appear worse compared to last admission, they do not feel any require debridement at this time but will continue to follow  Palliative wound care to left calcaneal wound/osteo per plan last admission from ID and Pod    Hypokalemia  Replaced  Mg++ level fine    Hypernatremia  Intravascular volume deficit with severe anasarca from third-spacing due to malnutrition  Defer to Renal--appreciate their attention to pt  Currently trying D5 and Lasix, Na+ down some again today and able to remove some volume    Immobility  Chronic forde  Dysphagia, PEG tube  Protein calorie malnutrition, severe  Continue PT  Good UOP  Continue tube feeds, Nutrition following  SLP recommends repeat VFSS when pt  able to comply  Previous admission had been on modified diet but did not like to eat (only drank fluids) so his nutrition is primarily via tube feeds--suspect he will have to remain strict NPO from now on    Dysarthria/Aphasia  Neuro consulted  MRI brain suggested possible inflammatory or infectious process  LP 9/24 negative for infection  EEG okay  Doing worse today, decreased LOC    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  DNR.  Discussed with patient. No family present.  Prognosis poor. Palliative Care has d/w family again and they continue to want everything done--those are pt's wishes per daughters. They did agree to changing to DNR.  Anticipate discharge back to SNU facility, timing yet to be determined.  Expected Discharge Date: 9/30/2024; Expected Discharge Time:       Walter Mancia MD  [facility]  09/27/24  09:02 EDT

## 2024-09-27 NOTE — PROGRESS NOTES
[facility] Progress Note      Patient Name: Anthony Gallegos  : 1945  MRN: [id number]  Primary Care Physician:  Keshav Eason MD  Date of admission: 2024    Subjective     Interval History:     Overnight no event  Patient continues to have a week of respiratory pattern, with tachypnea and superficial breathing  Currently off BiPAP  Poorly interactive volume status improved    Mariscal catheter in place 3.6 L    Review of Systems:   As noted above    Objective     Vitals:   Temp:  [97.3 °F (36.3 °C)-97.9 °F (36.6 °C)] 97.6 °F (36.4 °C)  Heart Rate:  [100-130] 104  Resp:  [28-39] 31  BP: ()/(41-68) 124/58  Flow (L/min):  [3-10] 8    Intake/Output Summary (Last 24 hours) at 2024 0807  Last data filed at 2024 0500  Gross per 24 hour   Intake 1996 ml   Output 3625 ml   Net -1629 ml       Physical Exam:      Constitutional: Chronically ill and deconditioned with bilateral temporal wasting  HEENT: Sclera anicteric, poor oral hygiene  Neck: Supple, no thyromegaly, no lymphadenopathy, trachea at midline, no JVD  Respiratory: Fine crackles bibasal  Cardiovascular: Tachycardic  Gastrointestinal: Positive bowel sounds, abdomen is soft, nontender and nondistended  : No palpable bladder  Musculoskeletal: Severe edema    Scheduled Meds:     atorvastatin, 40 mg, Per PEG Tube, Daily  cefTRIAXone, 2,000 mg, Intravenous, Q24H  chlorhexidine, 15 mL, Mouth/Throat, Q12H  enoxaparin, 40 mg, Subcutaneous, Q24H  furosemide, 20 mg, Intravenous, Q12H  guaifenesin, 200 mg, Per PEG Tube, Q6H  ipratropium-albuterol, 3 mL, Nebulization, 4x Daily - RT  levothyroxine, 25 mcg, Per PEG Tube, Q AM  pantoprazole, 40 mg, Intravenous, Q24H  senna-docusate sodium, 2 tablet, Per PEG Tube, BID  sodium hypochlorite, , Topical, Q12H  vancomycin, 750 mg, Intravenous, Q12H      IV Meds:   dextrose, 30 mL/hr, Last Rate: 30 mL/hr (24 1216)  Pharmacy to dose vancomycin,         Results Reviewed:   I  have personally reviewed the results from the time of this admission to 9/27/2024 08:07 EDT     Results from last 7 days   Lab Units 09/27/24  0656 09/26/24  0448 09/25/24  1409 09/25/24 0447 09/24/24 2035 09/24/24 0421 09/23/24  0948   SODIUM mmol/L 151* 152*  --  155*  --  154* 154*   POTASSIUM mmol/L 3.6 3.9 3.9 3.5   < > 3.5 3.9   CHLORIDE mmol/L 115* 118*  --  120*  --  121* 125*   CO2 mmol/L 31.8* 28.0  --  28.0  --  27.0 25.9   BUN mg/dL 28* 23  --  18  --  20 17   CREATININE mg/dL 0.55* 0.45*  --  0.53*  --  0.40* 0.43*   CALCIUM mg/dL 8.0* 8.1*  --  8.5*  --  8.3* 8.5*   BILIRUBIN mg/dL  --   --   --  0.6  --  0.5 0.4   ALK PHOS U/L  --   --   --  145*  --  171* 177*   ALT (SGPT) U/L  --   --   --  16  --  24 27   AST (SGOT) U/L  --   --   --  26  --  39 45*   GLUCOSE mg/dL 117* 117*  --  102*  --  80 130*    < > = values in this interval not displayed.       Estimated Creatinine Clearance: 120 mL/min (A) (by C-G formula based on SCr of 0.55 mg/dL (L)).    Results from last 7 days   Lab Units 09/27/24  0656 09/26/24  1555 09/26/24 0448 09/25/24 1409 09/25/24 0447   MAGNESIUM mg/dL 2.0  --  2.2  --  1.9   PHOSPHORUS mg/dL 3.9 2.9 2.1*   < > 2.0*    < > = values in this interval not displayed.             Results from last 7 days   Lab Units 09/27/24  0656 09/26/24 0448 09/25/24 0447 09/24/24 0421 09/23/24  0948   WBC 10*3/mm3 11.34* 11.00* 10.58 13.94* 14.91*   HEMOGLOBIN g/dL 7.1* 7.5* 7.7* 8.7* 8.5*   PLATELETS 10*3/mm3 302 270 228 242 219             Assessment / Plan     ASSESSMENT:    -Hypernatremia.  Unfortunately with severe anasarca from  third spacing from malnutrition.  With decreased effective intravascular volume.  Unfortunately attempt to provide IV fluids likely will decompensate his weak respiratory pattern w HIGH risk of PULMONARY EDEMA . .  Continue PEG tube water flushes  -History of nephrotic syndrome earlier this year UPCR 8.4 and 3.7 g will repeat likely contributing factor for  volume overload  -History of septic shock secondary to bilateral aspiration pneumonia due to MRSA and E. coli with hypoxic respiratory failure followed by pulmonary and primary team regarding the patient mechanical support extubated 9/20/2024  -Multiple sacral pressure ulcers followed by wound care and primary team  -Chronic immobility syndrome with dysphagia and PEG tube placement with malnutrition followed by dietary  -Ankylosing spondylitis previously on methotrexate currently on hold likely will not have a significant long-term benefit on his mobility with high risk of complications and agree with primary team with discontinuing long-term    PLAN:    -Patient clinically responding to gentle D5 infusion and diuretic combination, volume status and edema gradually improving  -Continue surveillance labs  -Patient overall with poor prognosis      Thank you for involving us in the care of Anthony Gallegos.  Please feel free to call with any questions.    Kishan Woo MD  09/27/24  08:07 EDT    [facility]  [phone]    Please note that portions of this note were completed with a voice recognition program.

## 2024-09-28 LAB
B BURGDOR IGG PATRN CSF IB-IMP: NEGATIVE
B BURGDOR IGM PATRN CSF IB-IMP: NEGATIVE
B BURGDOR18KD IGG CSF QL IB: ABNORMAL
B BURGDOR23KD IGG CSF QL IB: ABNORMAL
B BURGDOR23KD IGM CSF QL IB: ABNORMAL
B BURGDOR28KD IGG CSF QL IB: ABNORMAL
B BURGDOR30KD IGG CSF QL IB: ABNORMAL
B BURGDOR39KD IGG CSF QL IB: PRESENT
B BURGDOR39KD IGM CSF QL IB: ABNORMAL
B BURGDOR41KD IGG CSF QL IB: ABNORMAL
B BURGDOR41KD IGM CSF QL IB: ABNORMAL
B BURGDOR45KD IGG CSF QL IB: ABNORMAL
B BURGDOR58KD IGG CSF QL IB: PRESENT
B BURGDOR66KD IGG CSF QL IB: ABNORMAL
B BURGDOR93KD IGG CSF QL IB: PRESENT

## 2024-09-28 NOTE — DISCHARGE SUMMARY
Patient Name: Anthony Gallegos  : 1945  MRN: [id number]    Date of Admission: 2024  Date of Death:  2024 at 2315  Primary Care Physician: Keshav Eason MD      Chief Complaint:   Shortness of Breath      Discharge Diagnoses     Active Hospital Problems    Diagnosis  POA    **Pneumonia of both lungs due to methicillin resistant Staphylococcus aureus (MRSA) [J15.212]  Yes    Hypophosphatemia [E83.39]  Yes    Hypernatremia [E87.0]  No    Pneumonia, aspiration [J69.0]  Yes    Acute respiratory failure with hypoxia and hypercapnia [J96.01, J96.02]  Yes    NSTEMI (non-ST elevated myocardial infarction) [I21.4]  Yes    Subacute osteomyelitis of left foot [M86.272]  Yes    Hypokalemia [E87.6]  No    Septic shock [A41.9, R65.21]  Yes    Hypothyroidism (acquired) [E03.9]  Yes    Sacral wound [S31.000A]  Yes    Hyperlipidemia [E78.5]  Yes    Feeding by G-tube [Z93.1]  Not Applicable    Indwelling Forde catheter present [Z97.8]  Not Applicable    Anemia [D64.9]  Yes    Failure to thrive in adult [R62.7]  Yes    Dysphagia [R13.10]  Yes    GERD without esophagitis [K21.9]  Yes    DISH (disseminated idiopathic skeletal hyperostosis) [M48.10]  Yes    Immobility [Z74.09]  Yes    Essential hypertension [I10]  Yes    Ankylosing spondylitis [M45.9]  Yes      Resolved Hospital Problems   No resolved problems to display.        Hospital Course     78yo gentleman with ankylosing spondylitis, HTN, BPH, chronic forde, h/o ESBL E.coli UTI, GERD, HLD, [place], ACD, hypoT4, O/P dysphagia chronically on tube feeds, and recent admission with multiple pressure wounds and left calcaneal osteomyelitis, who presented to ER from SNF with altered mental status, hypotension, and acute hypoxic resp failure. He was intubated in ER and admitted to ICU with severe sepsis due to bilateral aspiration PNA. We were asked to assume care when pt transitioned out of ICU.     Pt was managed initially by Pulm and ID in the ICU  Responded to tx  with Rocephin and Vanc  Resp cultures grew MRSA and E.coli  Required pressors and mechanical ventilation  Extubated   Pt had abrupt worsening of resp status --suspect recurrent aspiration event in the AM   Abx were continued and NIPPV provided  Pt did not respond well to tx and required pressor support again  Pt was upgraded to critical care status  D/w Dr. Thakkar on  and he felt pt's prognosis was very poor  Multiple discussions were had with family regarding his poor prognosis, they wanted to continue all care but did agree to changing code status to DNR  Despite the best efforts of staff and providers the pt continued to deteriorate  At 2100 on  the family decided to transition to comfort measures, all involved were in agreement  Pt  at 2315.        Day of Discharge     Subjective:  Pt worse today. Not responding to me. RR up.     Physical Exam:  Heart Rate:  [] 91  Resp:  [18-24] 24  BP: ()/(38-47) 89/39  Body mass index is 21.82 kg/m².  Physical Exam (AM )  Vitals and nursing note reviewed.   Constitutional:       General: He is not in acute distress.     Appearance: He is ill-appearing. He is not toxic-appearing or diaphoretic.      Comments: Eyes open but won't respond to me   HENT:      Head: Normocephalic.      Nose: Nose normal.      Mouth/Throat:      Mouth: Mucous membranes are dry.      Pharynx: Oropharynx is clear.   Eyes:      General: No scleral icterus.        Right eye: No discharge.         Left eye: No discharge.      Conjunctiva/sclera: Conjunctivae normal.   Cardiovascular:      Rate and Rhythm: Regular rhythm. Tachycardia present.      Pulses: Normal pulses.   Pulmonary:      Breath sounds: Normal breath sounds.      Comments: Anteriorly   Breathing shallow, RR up  Abdominal:      General: Bowel sounds are normal. There is no distension.      Palpations: Abdomen is soft.      Comments: PEG looks fine   Genitourinary:     Comments: Yellow urine in forde  bag  Musculoskeletal:         General: Swelling (1+ in all extremities) present.      Cervical back: Neck supple.      Comments: Heel boots in place  SCDs in place  Dressing to left heel   Skin:     General: Skin is warm and dry.      Capillary Refill: Capillary refill takes less than 2 seconds.      Coloration: Skin is not jaundiced.   Neurological:      Comments: Not alert today, cannot follow commands   Psychiatric:      Comments: Unable to assess        Consultants     Consult Orders (all) (From admission, onward)       Start     Ordered    09/23/24 1351  Inpatient Nephrology Consult  STAT        Specialty:  Nephrology  Provider:  Thompson Novoa MD    09/23/24 1350    09/23/24 1033  Inpatient Neurology Consult General  Once        Specialty:  Neurology  Provider:  Phillip Salas MD    09/23/24 1033    09/21/24 1341  Inpatient Hospitalist Consult  Once        Specialty:  Hospitalist  Provider:  Chaka Carballo MD    09/21/24 1340    09/18/24 1306  Inpatient Nutrition Consult  Once        Provider:  (Not yet assigned)    09/18/24 1305    09/17/24 0702  Inpatient Infectious Diseases Consult  IN AM        Specialty:  Infectious Diseases  Provider:  (Not yet assigned)    09/17/24 0333    09/17/24 0038  IP Palliative Care Nurse Consult  Once        Provider:  (Not yet assigned)    09/17/24 0037    09/17/24 0037  Pulmonology (on-call MD unless specified)  Once,   Status:  Canceled        Specialty:  Pulmonary Disease  Provider:  (Not yet assigned)    09/17/24 0036                  Procedures     * Surgery not found *    Imaging Results (All)       Procedure Component Value Units Date/Time    XR Chest 1 View [[id number]] Collected: 09/24/24 1617     Updated: 09/24/24 1622    Narrative:      XR CHEST 1 VW-     HISTORY: Male who is 79 years-old, worsening respiratory status     TECHNIQUE: Frontal views of the chest     COMPARISON: 9/19/2024     FINDINGS: The heart size appears borderline. Aorta is  calcified.  Extensive bilateral pulmonary opacities show interval worsening, with  mild to moderate bilateral pleural effusions suggested. No pneumothorax.  Otherwise stable.       Impression:      Interval worsening of extensive bilateral pulmonary  opacities, with suspected pleural effusions.     This report was finalized on 9/24/2024 4:19 PM by Dr. 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     FINDINGS:  Multiplanar images of the head were obtained without and with  gadolinium. Images are severely degraded by motion artifact. Mucosal  thickening is noted within the ethmoid sinuses. No areas of restricted  diffusion are seen to suggest acute infarct. There is atrophy. There is  periventricular and deep white matter microangiopathic change. There is  no midline shift or mass effect. Intracranial flow voids appear intact.  Old infarct is suspected within the right frontal corona radiata. No  definite abnormality is seen on susceptibility weighted imaging,  although there is severe motion artifact on these images. Patient  appears to have some mild diffuse pachymeningeal enhancement.          Impression:      1. Images are significantly degraded by motion artifact. No restricted  diffusion is seen to suggest acute infarct. The patient is noted to have  mild diffuse pachymeningeal enhancement. Appearance is nonspecific,  but  potentially may be infectious or inflammatory in etiology. Correlation  with clinical presentation is recommended.        This report was finalized on 9/24/2024 4:49 AM by Dr. Kirsten Peña M.D on Workstation: [id number]       XR Chest 1 View [[id number]] Collected: 09/19/24 0936     Updated: 09/19/24 0942    Narrative:      Portable chest radiograph     HISTORY: Intubated     TECHNIQUE: Single AP portable radiograph of the chest     COMPARISON: Multiple chest radiographs and back to 9/17/2024       Impression:      FINDINGS AND IMPRESSION:  Endotracheal tube tip terminates approximately 7.2 cm above the gabo.  Moderate to extensive pulmonary opacification scattered throughout the  bilateral lungs with interval worsening within the right upper and lower  lung since 9/18/2024 when accounting for differences in technique.  Cardiac silhouette is within normal limits for size. No pneumothorax is  seen     This report was finalized on 9/19/2024 9:39 AM by Dr. Roddy Chaparro M.D  on Workstation: [id number]       XR Chest 1 View [[id number]] Collected: 09/18/24 0641     Updated: 09/18/24 0648    Narrative:      XR CHEST 1 VW-     INDICATION: Intubated patient     COMPARISON: Chest radiographs dating back to 6/21/2017       Impression:      Endotracheal tube with tip approximately 5.5 cm above the  gabo.     Unchanged hyperinflated hyperlucent lungs likely representing underlying  emphysematous changes. Unchanged remaining confluent left lung opacities  with central predominance and confluent hazy right lower lobe opacities  suggesting pneumonitis/pneumonia. No measurable pleural effusion or  pneumothorax.        This report was finalized on 9/18/2024 6:44 AM by Dr. Landon Vaca M.D on Workstation: [id number]       XR Abdomen KUB [[id number]] Collected: 09/17/24 0632     Updated: 09/17/24 0639    Narrative:      XR ABDOMEN KUB-     Clinical: Abdominal distention     FINDINGS: There is a greater amount  of formed fecal material seen within  the colon than typically seen consistent with constipation. Fecal  material within the rectum appears fairly dense, probable fecal  impaction. Bladder catheter in position. Nonobstructive bowel gas  pattern seen. No gross indication of underlying free intraperitoneal  air. G or J-tube superimposes the left mid abdomen. Remainder is  unremarkable.     This report was finalized on 9/17/2024 6:36 AM by Dr. Vamsi Pettit M.D  on Workstation: [id number]       XR Chest 1 View [[id number]] Collected: 09/17/24 0628     Updated: 09/17/24 0635    Narrative:      XR CHEST 1 VW-     Clinical: Ventilator management     COMPARISON examination 12:49 a.m., current examination 6:13 a.m.     FINDINGS: Endotracheal tube in position as before. Cardiac size within  normal limits. Left lung consolidation appears similar to or slightly  more pronounced compared to the previous examination. Patchy infiltrates  within the right lung as before. No gross pleural effusion. No other  interval change has occurred.     This report was finalized on 9/17/2024 6:32 AM by Dr. Vamsi Pettit M.D  on Workstation: [id number]       CT Head Without Contrast [[id number]] Collected: 09/17/24 0601     Updated: 09/17/24 0601    Narrative:        Patient: JADE MARAVILLA  Time Out: 06:01  Exam(s): CT HEAD Without Contrast     EXAM:    CT Head Without Intravenous Contrast    CLINICAL HISTORY:     Reason for exam: AMS. AMS.    TECHNIQUE:    Axial computed tomography images of the head brain without intravenous   contrast.  CTDI is 55.7 mGy and DLP is 988.9 mGy-cm.  This CT exam was   performed according to the principle of ALARA (As Low As Reasonably   Achievable) by using one or more of the following dose reduction   techniques: automated exposure control, adjustment of the mA and or kV   according to patient size, and or use of iterative reconstruction   technique.    COMPARISON:    CT Head dated june 18  2024    FINDINGS:    Brain:  No acute intracranial abnormality.  Consider MRI if there is   further concern.  Areas of decreased attenuation in the deep cerebral   white matter are consistent with small vessel ischemic degenerative   changes.  The cerebral and cerebellar sulci are prominent consistent with   brain atrophy.  No hemorrhage.    Ventricles:  Unremarkable.  No ventriculomegaly.    Bones joints:  Unremarkable.  No acute fracture.    Soft tissues:  Unremarkable.    Vasculature:  Atherosclerotic disease.    Sinuses:  Unremarkable as visualized.    Mastoid air cells:  Unremarkable as visualized.  No mastoid effusion.    IMPRESSION:       1.  No acute intracranial abnormality.  Consider MRI if there is further   concern.  2.  Small vessel ischemic degenerative changes.  3.  Cerebral and cerebellar atrophy.      Impression:          Electronically signed by Landon Osborn MD on 09-17-24 at 0601    XR Chest 1 View [[id number]] Collected: 09/17/24 0204     Updated: 09/17/24 0204    Narrative:        Patient: JADE MARAVILLA  Time Out: 02:03  Exam(s): XR CXR 1 VIEW     EXAM:    XR Chest, 1 View    CLINICAL HISTORY:     Reason for exam: AMS.    TECHNIQUE:    Frontal view of the chest.    COMPARISON:      FINDINGS:    See Impression.    IMPRESSION:    Similar lung opacities.    Impression:          Electronically signed by Manasa Reina MD on 09-17-24 at 0203    XR Chest 1 View [[id number]] Collected: 09/17/24 0142     Updated: 09/17/24 0142    Narrative:        Patient: JADE MARAVILLA  Time Out: 01:41  Exam(s): XR CXR 1 VIEW     EXAM:    XR Chest, 1 View    CLINICAL HISTORY:     Reason for exam: s p intubation.    TECHNIQUE:    Frontal view of the chest.    COMPARISON:    6 18 2024    FINDINGS:    See Impression.    IMPRESSION:    ET tube terminates 3 cm from the gabo    Impression:          Electronically signed by Manasa Reina MD on 09-17-24 at 0141          Results for orders placed during the  hospital encounter of 12/20/23    Duplex Venous Lower Extremity - Left CAR    Interpretation Summary    Normal left lower extremity venous duplex scan.    Results for orders placed during the hospital encounter of 08/17/24    Adult Transthoracic Echo Complete W/ Cont if Necessary Per Protocol    Interpretation Summary    Left ventricular systolic function is hyperdynamic (EF > 70%)    Left ventricular diastolic function is consistent with (grade I) impaired relaxation.    Normal right ventricular cavity size and systolic function noted.    The left atrial cavity is mildly dilated.    Insufficient TR velocity profile to estimate the right ventricular systolic pressure.    There is no evidence of pericardial effusion    Pertinent Labs     Results from last 7 days   Lab Units 09/27/24  0656 09/26/24  0448 09/25/24 0447 09/24/24  0421   WBC 10*3/mm3 11.34* 11.00* 10.58 13.94*   HEMOGLOBIN g/dL 7.1* 7.5* 7.7* 8.7*   PLATELETS 10*3/mm3 302 270 228 242     Results from last 7 days   Lab Units 09/27/24  0656 09/26/24  0448 09/25/24  1409 09/25/24 0447 09/24/24 2035 09/24/24  0421   SODIUM mmol/L 151* 152*  --  155*  --  154*   POTASSIUM mmol/L 3.6 3.9 3.9 3.5   < > 3.5   CHLORIDE mmol/L 115* 118*  --  120*  --  121*   CO2 mmol/L 31.8* 28.0  --  28.0  --  27.0   BUN mg/dL 28* 23  --  18  --  20   CREATININE mg/dL 0.55* 0.45*  --  0.53*  --  0.40*   GLUCOSE mg/dL 117* 117*  --  102*  --  80   EGFR mL/min/1.73 100.2 106.4  --  101.3  --  110.3    < > = values in this interval not displayed.     Results from last 7 days   Lab Units 09/25/24 0447 09/24/24 0421 09/23/24  0948   ALBUMIN g/dL 2.9* 1.9* 1.7*   BILIRUBIN mg/dL 0.6 0.5 0.4   ALK PHOS U/L 145* 171* 177*   AST (SGOT) U/L 26 39 45*   ALT (SGPT) U/L 16 24 27     Results from last 7 days   Lab Units 09/27/24  0656 09/26/24  1555 09/26/24  0448 09/25/24  1409 09/25/24  0447 09/24/24  2035 09/24/24  0421 09/23/24  0948   CALCIUM mg/dL 8.0*  --  8.1*  --  8.5*  --  8.3*  "8.5*   ALBUMIN g/dL  --   --   --   --  2.9*  --  1.9* 1.7*   MAGNESIUM mg/dL 2.0  --  2.2  --  1.9  --  2.0 2.0   PHOSPHORUS mg/dL 3.9 2.9 2.1* 2.3* 2.0*   < > 1.8* 0.3*    < > = values in this interval not displayed.         Results from last 7 days   Lab Units 24  0928   SODIUM UR mmol/L 93   CREATININE UR mg/dL 22.6   OSMOLALITY UR mOsm/kg 471   PROTEIN TOTAL URINE mg/dL 69.9   PROT/CREAT RATIO UR mg/G Crea 3,092.9*         Invalid input(s): \"LDLCALC\"          Test Results Pending at Discharge     Pending Results       Procedure [Order ID] Specimen - Date/Time    Copper, Serum [[id number]] Collected: 24 1001    Specimen: Blood Updated: 24 1103              Discharge Details       No Known Allergies    Discharge Disposition:   in Medical Facility    CODE STATUS:    Code Status and Medical Interventions: No CPR (Do Not Attempt to Resuscitate); Comfort Measures   Ordered at: 24 2104     Code Status (Patient has no pulse and is not breathing):    No CPR (Do Not Attempt to Resuscitate)     Medical Interventions (Patient has pulse or is breathing):    Comfort Measures       Time Spent on Discharge:  Greater than 30 minutes      Walter Mancia MD  [facility], [facility]  24  14:58 EDT    "

## 2024-09-28 NOTE — PROGRESS NOTES
Pulm/ CC Note    Family has elected to transition to comfort measures.  Reviewed patient's chart and consultant/intensivist notes recommending palliative care.    Orders placed.

## 2024-09-28 NOTE — NURSING NOTE
Patient passed during shift at 2315. Family made aware, along with MD. See Navigator for expiration detail and notifications.

## 2024-09-29 LAB
MYCOBACTERIUM SPEC CULT: NORMAL
NIGHT BLUE STAIN TISS: NORMAL

## 2024-10-01 LAB
COPPER SERPL-MCNC: 86 UG/DL (ref 69–132)
MYCOBACTERIUM SPEC CULT: NORMAL
NIGHT BLUE STAIN TISS: NORMAL

## 2024-10-01 NOTE — PAYOR COMM NOTE
"Anthony Gallegos (Dcsd. Male)     PLEASE SEE ATTACHED FOR DC NOTICE    REF #[id number]     THANK YOU  CATHIE DONALD RN/ DEPT  [facility]   [phone]  [phone]        Date of Birth   1945    Social Security Number       Address   [address] [id number]    Home Phone   [phone]    MRN   [id number]       Yazdanism   [facility]    Marital Status                               Admission Date   24    Admission Type   Emergency    Admitting Provider   Livier Vegas MD    Attending Provider       Department, Room/Bed   [facility] CORONARY CARE, N331/1       Discharge Date   2024    Discharge Disposition    in Medical Facility    Discharge Destination                                 Attending Provider: (none)   Allergies: No Known Allergies    Isolation: None   Infection: MRSA (24)   Code Status: Prior    Ht: 190.5 cm (75\")   Wt: 79.2 kg (174 lb 9.7 oz)    Admission Cmt: None   Principal Problem: Pneumonia of both lungs due to methicillin resistant Staphylococcus aureus (MRSA) [J15.212]                   Active Insurance as of 2024       Primary Coverage       Payor Plan Insurance Group Employer/Plan Group    AETNA MEDICARE REPLACEMENT AETNA MED ADV POS [id number]       Payor Plan Address Payor Plan Phone Number Payor Plan Fax Number Effective Dates    [address] [phone]  2023 - None Entered    [facility] [id number]         Subscriber Name Subscriber Birth Date Member ID       ANTHONY GALLEGOS 1945 [id number]                     Emergency Contacts        (Rel.) Home Phone Work Phone Mobile Phone    NATHALIE DON (Daughter) [phone] -- [phone]    MAGY GALLEGOS (Son) [phone] -- [phone]              [employer]: NPI [id number]  Tax ID [id number]     Discharge Summary        Walter Mancia MD at 24 0251              Patient Name: Anthony Gallegos  : " 1945  MRN: [id number]    Date of Admission: 9/16/2024  Date of Death:  9/27/2024 at 2315  Primary Care Physician: Keshav Eason MD      Chief Complaint:   Shortness of Breath      Discharge Diagnoses     Active Hospital Problems    Diagnosis  POA    **Pneumonia of both lungs due to methicillin resistant Staphylococcus aureus (MRSA) [J15.212]  Yes    Hypophosphatemia [E83.39]  Yes    Hypernatremia [E87.0]  No    Pneumonia, aspiration [J69.0]  Yes    Acute respiratory failure with hypoxia and hypercapnia [J96.01, J96.02]  Yes    NSTEMI (non-ST elevated myocardial infarction) [I21.4]  Yes    Subacute osteomyelitis of left foot [M86.272]  Yes    Hypokalemia [E87.6]  No    Septic shock [A41.9, R65.21]  Yes    Hypothyroidism (acquired) [E03.9]  Yes    Sacral wound [S31.000A]  Yes    Hyperlipidemia [E78.5]  Yes    Feeding by G-tube [Z93.1]  Not Applicable    Indwelling Forde catheter present [Z97.8]  Not Applicable    Anemia [D64.9]  Yes    Failure to thrive in adult [R62.7]  Yes    Dysphagia [R13.10]  Yes    GERD without esophagitis [K21.9]  Yes    DISH (disseminated idiopathic skeletal hyperostosis) [M48.10]  Yes    Immobility [Z74.09]  Yes    Essential hypertension [I10]  Yes    Ankylosing spondylitis [M45.9]  Yes      Resolved Hospital Problems   No resolved problems to display.        Hospital Course     78yo gentleman with ankylosing spondylitis, HTN, BPH, chronic forde, h/o ESBL E.coli UTI, GERD, HLD, TRENTON, ACD, hypoT4, O/P dysphagia chronically on tube feeds, and recent admission with multiple pressure wounds and left calcaneal osteomyelitis, who presented to ER from SNF with altered mental status, hypotension, and acute hypoxic resp failure. He was intubated in ER and admitted to ICU with severe sepsis due to bilateral aspiration PNA. We were asked to assume care when pt transitioned out of ICU.     Pt was managed initially by Pulm and ID in the ICU  Responded to tx with Rocephin and Vanc  Resp cultures grew  MRSA and E.coli  Required pressors and mechanical ventilation  Extubated   Pt had abrupt worsening of resp status --suspect recurrent aspiration event in the AM   Abx were continued and NIPPV provided  Pt did not respond well to tx and required pressor support again  Pt was upgraded to critical care status  D/w Dr. Thakkar on  and he felt pt's prognosis was very poor  Multiple discussions were had with family regarding his poor prognosis, they wanted to continue all care but did agree to changing code status to DNR  Despite the best efforts of staff and providers the pt continued to deteriorate  At 2100 on  the family decided to transition to comfort measures, all involved were in agreement  Pt  at 2315.        Day of Discharge     Subjective:  Pt worse today. Not responding to me. RR up.     Physical Exam:  Heart Rate:  [] 91  Resp:  [18-24] 24  BP: ()/(38-47) 89/39  Body mass index is 21.82 kg/m².  Physical Exam (AM )  Vitals and nursing note reviewed.   Constitutional:       General: He is not in acute distress.     Appearance: He is ill-appearing. He is not toxic-appearing or diaphoretic.      Comments: Eyes open but won't respond to me   HENT:      Head: Normocephalic.      Nose: Nose normal.      Mouth/Throat:      Mouth: Mucous membranes are dry.      Pharynx: Oropharynx is clear.   Eyes:      General: No scleral icterus.        Right eye: No discharge.         Left eye: No discharge.      Conjunctiva/sclera: Conjunctivae normal.   Cardiovascular:      Rate and Rhythm: Regular rhythm. Tachycardia present.      Pulses: Normal pulses.   Pulmonary:      Breath sounds: Normal breath sounds.      Comments: Anteriorly   Breathing shallow, RR up  Abdominal:      General: Bowel sounds are normal. There is no distension.      Palpations: Abdomen is soft.      Comments: PEG looks fine   Genitourinary:     Comments: Yellow urine in forde bag  Musculoskeletal:         General:  Swelling (1+ in all extremities) present.      Cervical back: Neck supple.      Comments: Heel boots in place  SCDs in place  Dressing to left heel   Skin:     General: Skin is warm and dry.      Capillary Refill: Capillary refill takes less than 2 seconds.      Coloration: Skin is not jaundiced.   Neurological:      Comments: Not alert today, cannot follow commands   Psychiatric:      Comments: Unable to assess        Consultants     Consult Orders (all) (From admission, onward)       Start     Ordered    09/23/24 1351  Inpatient Nephrology Consult  STAT        Specialty:  Nephrology  Provider:  Thompson Novoa MD    09/23/24 1350    09/23/24 1033  Inpatient Neurology Consult General  Once        Specialty:  Neurology  Provider:  Phillip Salas MD    09/23/24 1033    09/21/24 1341  Inpatient Hospitalist Consult  Once        Specialty:  Hospitalist  Provider:  Chaka Carballo MD    09/21/24 1340    09/18/24 1306  Inpatient Nutrition Consult  Once        Provider:  (Not yet assigned)    09/18/24 1305    09/17/24 0702  Inpatient Infectious Diseases Consult  IN AM        Specialty:  Infectious Diseases  Provider:  (Not yet assigned)    09/17/24 0333    09/17/24 0038  IP Palliative Care Nurse Consult  Once        Provider:  (Not yet assigned)    09/17/24 0037    09/17/24 0037  Pulmonology (on-call MD unless specified)  Once,   Status:  Canceled        Specialty:  Pulmonary Disease  Provider:  (Not yet assigned)    09/17/24 0036                  Procedures     * Surgery not found *    Imaging Results (All)       Procedure Component Value Units Date/Time    XR Chest 1 View [[id number]] Collected: 09/24/24 1617     Updated: 09/24/24 1622    Narrative:      XR CHEST 1 VW-     HISTORY: Male who is 79 years-old, worsening respiratory status     TECHNIQUE: Frontal views of the chest     COMPARISON: 9/19/2024     FINDINGS: The heart size appears borderline. Aorta is calcified.  Extensive bilateral pulmonary  opacities show interval worsening, with  mild to moderate bilateral pleural effusions suggested. No pneumothorax.  Otherwise stable.       Impression:      Interval worsening of extensive bilateral pulmonary  opacities, with suspected pleural effusions.     This report was finalized on 9/24/2024 4:19 PM by Dr. 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     FINDINGS:  Multiplanar images of the head were obtained without and with  gadolinium. Images are severely degraded by motion artifact. Mucosal  thickening is noted within the ethmoid sinuses. No areas of restricted  diffusion are seen to suggest acute infarct. There is atrophy. There is  periventricular and deep white matter microangiopathic change. There is  no midline shift or mass effect. Intracranial flow voids appear intact.  Old infarct is suspected within the right frontal corona radiata. No  definite abnormality is seen on susceptibility weighted imaging,  although there is severe motion artifact on these images. Patient  appears to have some mild diffuse pachymeningeal enhancement.          Impression:      1. Images are significantly degraded by motion artifact. No restricted  diffusion is seen to suggest acute infarct. The patient is noted to have  mild diffuse pachymeningeal enhancement. Appearance is nonspecific, but  potentially may be infectious or  inflammatory in etiology. Correlation  with clinical presentation is recommended.        This report was finalized on 9/24/2024 4:49 AM by Dr. Kirsten Peña M.D on Workstation: [id number]       XR Chest 1 View [[id number]] Collected: 09/19/24 0936     Updated: 09/19/24 0942    Narrative:      Portable chest radiograph     HISTORY: Intubated     TECHNIQUE: Single AP portable radiograph of the chest     COMPARISON: Multiple chest radiographs and back to 9/17/2024       Impression:      FINDINGS AND IMPRESSION:  Endotracheal tube tip terminates approximately 7.2 cm above the gabo.  Moderate to extensive pulmonary opacification scattered throughout the  bilateral lungs with interval worsening within the right upper and lower  lung since 9/18/2024 when accounting for differences in technique.  Cardiac silhouette is within normal limits for size. No pneumothorax is  seen     This report was finalized on 9/19/2024 9:39 AM by Dr. Roddy Chaparro M.D  on Workstation: [id number]       XR Chest 1 View [[id number]] Collected: 09/18/24 0641     Updated: 09/18/24 0648    Narrative:      XR CHEST 1 VW-     INDICATION: Intubated patient     COMPARISON: Chest radiographs dating back to 6/21/2017       Impression:      Endotracheal tube with tip approximately 5.5 cm above the  gabo.     Unchanged hyperinflated hyperlucent lungs likely representing underlying  emphysematous changes. Unchanged remaining confluent left lung opacities  with central predominance and confluent hazy right lower lobe opacities  suggesting pneumonitis/pneumonia. No measurable pleural effusion or  pneumothorax.        This report was finalized on 9/18/2024 6:44 AM by Dr. Landon Vaca M.D on Workstation: [id number]       XR Abdomen KUB [[id number]] Collected: 09/17/24 0632     Updated: 09/17/24 0639    Narrative:      XR ABDOMEN KUB-     Clinical: Abdominal distention     FINDINGS: There is a greater amount of formed fecal material seen  within  the colon than typically seen consistent with constipation. Fecal  material within the rectum appears fairly dense, probable fecal  impaction. Bladder catheter in position. Nonobstructive bowel gas  pattern seen. No gross indication of underlying free intraperitoneal  air. G or J-tube superimposes the left mid abdomen. Remainder is  unremarkable.     This report was finalized on 9/17/2024 6:36 AM by Dr. Vamsi Pettit M.D  on Workstation: [id number]       XR Chest 1 View [[id number]] Collected: 09/17/24 0628     Updated: 09/17/24 0635    Narrative:      XR CHEST 1 VW-     Clinical: Ventilator management     COMPARISON examination 12:49 a.m., current examination 6:13 a.m.     FINDINGS: Endotracheal tube in position as before. Cardiac size within  normal limits. Left lung consolidation appears similar to or slightly  more pronounced compared to the previous examination. Patchy infiltrates  within the right lung as before. No gross pleural effusion. No other  interval change has occurred.     This report was finalized on 9/17/2024 6:32 AM by Dr. Vamsi Pettit M.D  on Workstation: [id number]       CT Head Without Contrast [[id number]] Collected: 09/17/24 0601     Updated: 09/17/24 0601    Narrative:        Patient: JADE MARAVILLA  Time Out: 06:01  Exam(s): CT HEAD Without Contrast     EXAM:    CT Head Without Intravenous Contrast    CLINICAL HISTORY:     Reason for exam: AMS. AMS.    TECHNIQUE:    Axial computed tomography images of the head brain without intravenous   contrast.  CTDI is 55.7 mGy and DLP is 988.9 mGy-cm.  This CT exam was   performed according to the principle of ALARA (As Low As Reasonably   Achievable) by using one or more of the following dose reduction   techniques: automated exposure control, adjustment of the mA and or kV   according to patient size, and or use of iterative reconstruction   technique.    COMPARISON:    CT Head dated june 18 2024    FINDINGS:    Brain:  No acute  intracranial abnormality.  Consider MRI if there is   further concern.  Areas of decreased attenuation in the deep cerebral   white matter are consistent with small vessel ischemic degenerative   changes.  The cerebral and cerebellar sulci are prominent consistent with   brain atrophy.  No hemorrhage.    Ventricles:  Unremarkable.  No ventriculomegaly.    Bones joints:  Unremarkable.  No acute fracture.    Soft tissues:  Unremarkable.    Vasculature:  Atherosclerotic disease.    Sinuses:  Unremarkable as visualized.    Mastoid air cells:  Unremarkable as visualized.  No mastoid effusion.    IMPRESSION:       1.  No acute intracranial abnormality.  Consider MRI if there is further   concern.  2.  Small vessel ischemic degenerative changes.  3.  Cerebral and cerebellar atrophy.      Impression:          Electronically signed by Landon Osborn MD on 09-17-24 at 0601    XR Chest 1 View [[id number]] Collected: 09/17/24 0204     Updated: 09/17/24 0204    Narrative:        Patient: JADE MARAVILLA  Time Out: 02:03  Exam(s): XR CXR 1 VIEW     EXAM:    XR Chest, 1 View    CLINICAL HISTORY:     Reason for exam: AMS.    TECHNIQUE:    Frontal view of the chest.    COMPARISON:      FINDINGS:    See Impression.    IMPRESSION:    Similar lung opacities.    Impression:          Electronically signed by Manasa Reina MD on 09-17-24 at 0203    XR Chest 1 View [[id number]] Collected: 09/17/24 0142     Updated: 09/17/24 0142    Narrative:        Patient: JADE MARAVILLA  Time Out: 01:41  Exam(s): XR CXR 1 VIEW     EXAM:    XR Chest, 1 View    CLINICAL HISTORY:     Reason for exam: s p intubation.    TECHNIQUE:    Frontal view of the chest.    COMPARISON:    6 18 2024    FINDINGS:    See Impression.    IMPRESSION:    ET tube terminates 3 cm from the gabo    Impression:          Electronically signed by Manasa Reina MD on 09-17-24 at 0141          Results for orders placed during the hospital encounter of 12/20/23    Duplex  Venous Lower Extremity - Left CAR    Interpretation Summary    Normal left lower extremity venous duplex scan.    Results for orders placed during the hospital encounter of 08/17/24    Adult Transthoracic Echo Complete W/ Cont if Necessary Per Protocol    Interpretation Summary    Left ventricular systolic function is hyperdynamic (EF > 70%)    Left ventricular diastolic function is consistent with (grade I) impaired relaxation.    Normal right ventricular cavity size and systolic function noted.    The left atrial cavity is mildly dilated.    Insufficient TR velocity profile to estimate the right ventricular systolic pressure.    There is no evidence of pericardial effusion    Pertinent Labs     Results from last 7 days   Lab Units 09/27/24  0656 09/26/24 0448 09/25/24 0447 09/24/24 0421   WBC 10*3/mm3 11.34* 11.00* 10.58 13.94*   HEMOGLOBIN g/dL 7.1* 7.5* 7.7* 8.7*   PLATELETS 10*3/mm3 302 270 228 242     Results from last 7 days   Lab Units 09/27/24  0656 09/26/24  0448 09/25/24  1409 09/25/24 0447 09/24/24 2035 09/24/24 0421   SODIUM mmol/L 151* 152*  --  155*  --  154*   POTASSIUM mmol/L 3.6 3.9 3.9 3.5   < > 3.5   CHLORIDE mmol/L 115* 118*  --  120*  --  121*   CO2 mmol/L 31.8* 28.0  --  28.0  --  27.0   BUN mg/dL 28* 23  --  18  --  20   CREATININE mg/dL 0.55* 0.45*  --  0.53*  --  0.40*   GLUCOSE mg/dL 117* 117*  --  102*  --  80   EGFR mL/min/1.73 100.2 106.4  --  101.3  --  110.3    < > = values in this interval not displayed.     Results from last 7 days   Lab Units 09/25/24 0447 09/24/24 0421 09/23/24  0948   ALBUMIN g/dL 2.9* 1.9* 1.7*   BILIRUBIN mg/dL 0.6 0.5 0.4   ALK PHOS U/L 145* 171* 177*   AST (SGOT) U/L 26 39 45*   ALT (SGPT) U/L 16 24 27     Results from last 7 days   Lab Units 09/27/24  0656 09/26/24  1555 09/26/24  0448 09/25/24  1409 09/25/24  0447 09/24/24  2035 09/24/24  0421 09/23/24  0948   CALCIUM mg/dL 8.0*  --  8.1*  --  8.5*  --  8.3* 8.5*   ALBUMIN g/dL  --   --   --   --   "2.9*  --  1.9* 1.7*   MAGNESIUM mg/dL 2.0  --  2.2  --  1.9  --  2.0 2.0   PHOSPHORUS mg/dL 3.9 2.9 2.1* 2.3* 2.0*   < > 1.8* 0.3*    < > = values in this interval not displayed.         Results from last 7 days   Lab Units 24  0928   SODIUM UR mmol/L 93   CREATININE UR mg/dL 22.6   OSMOLALITY UR mOsm/kg 471   PROTEIN TOTAL URINE mg/dL 69.9   PROT/CREAT RATIO UR mg/G Crea 3,092.9*         Invalid input(s): \"LDLCALC\"          Test Results Pending at Discharge     Pending Results       Procedure [Order ID] Specimen - Date/Time    Copper, Serum [[id number]] Collected: 24 1001    Specimen: Blood Updated: 24 1103              Discharge Details       No Known Allergies    Discharge Disposition:   in Medical Facility    CODE STATUS:    Code Status and Medical Interventions: No CPR (Do Not Attempt to Resuscitate); Comfort Measures   Ordered at: 24 2104     Code Status (Patient has no pulse and is not breathing):    No CPR (Do Not Attempt to Resuscitate)     Medical Interventions (Patient has pulse or is breathing):    Comfort Measures       Time Spent on Discharge:  Greater than 30 minutes      Walter Mancia MD  [facility]  24  14:58 EDT      Electronically signed by Walter Mancia MD at 24 1510       "

## 2024-10-08 LAB
MYCOBACTERIUM SPEC CULT: NORMAL
NIGHT BLUE STAIN TISS: NORMAL

## 2024-10-10 NOTE — CASE MANAGEMENT/SOCIAL WORK
Case Management Discharge Note      Final Note: Pt .    Provided Post Acute Provider List?: N/A  N/A Provider List Comment: patient i LTC and daughter Whit wants return to [facility]  Provided Post Acute Provider Quality & Resource List?: N/A    Selected Continued Care - Discharged on 2024 Admission date: 2024 - Discharge disposition:  in Medical Facility      Destination Coordination complete.      Service Provider Selected Services Address Phone Fax Patient Preferred    [facility] [address] [facility] [address]  [phone] --              Durable Medical Equipment    No services have been selected for the patient.                Dialysis/Infusion    No services have been selected for the patient.                Home Medical Care    No services have been selected for the patient.                Therapy    No services have been selected for the patient.                Community Resources    No services have been selected for the patient.                Community & DME    No services have been selected for the patient.                    Selected Continued Care - Prior Encounters Includes continued care and service providers with selected services from prior encounters from 2024 to 2024      Discharged on 2024 Admission date: 2024 - Discharge disposition: Intermediate Care       Destination       Service Provider Selected Services Address Phone Fax Patient Preferred    [facility] [address] [address] [facility] [facility]  [phone] --                      Discharged on 2024 Admission date: 2024 - Discharge disposition: Intermediate Care       Destination       Service Provider Selected Services Address Phone Fax Patient Preferred    [facility] [address] [id number] [phone]  --                               Final Discharge Disposition Code: 20 -

## 2024-10-15 LAB
MYCOBACTERIUM SPEC CULT: NORMAL
NIGHT BLUE STAIN TISS: NORMAL

## 2024-10-22 LAB
MYCOBACTERIUM SPEC CULT: NORMAL
NIGHT BLUE STAIN TISS: NORMAL

## 2024-10-29 LAB
MYCOBACTERIUM SPEC CULT: NORMAL
NIGHT BLUE STAIN TISS: NORMAL

## 2024-11-05 LAB
MYCOBACTERIUM SPEC CULT: NORMAL
NIGHT BLUE STAIN TISS: NORMAL
